# Patient Record
Sex: MALE | Race: WHITE | Employment: OTHER | URBAN - METROPOLITAN AREA
[De-identification: names, ages, dates, MRNs, and addresses within clinical notes are randomized per-mention and may not be internally consistent; named-entity substitution may affect disease eponyms.]

---

## 2017-05-11 ENCOUNTER — TRANSFERRED RECORDS (OUTPATIENT)
Dept: HEALTH INFORMATION MANAGEMENT | Facility: CLINIC | Age: 72
End: 2017-05-11

## 2017-05-24 ENCOUNTER — OFFICE VISIT (OUTPATIENT)
Dept: FAMILY MEDICINE | Facility: CLINIC | Age: 72
End: 2017-05-24
Payer: MEDICARE

## 2017-05-24 VITALS
HEART RATE: 57 BPM | WEIGHT: 251.9 LBS | HEIGHT: 73 IN | BODY MASS INDEX: 33.38 KG/M2 | DIASTOLIC BLOOD PRESSURE: 71 MMHG | SYSTOLIC BLOOD PRESSURE: 141 MMHG | OXYGEN SATURATION: 96 % | TEMPERATURE: 98 F

## 2017-05-24 DIAGNOSIS — I25.10 CORONARY ARTERY DISEASE INVOLVING NATIVE CORONARY ARTERY OF NATIVE HEART WITHOUT ANGINA PECTORIS: ICD-10-CM

## 2017-05-24 DIAGNOSIS — Z89.431 HISTORY OF AMPUTATION OF RIGHT FOOT (H): ICD-10-CM

## 2017-05-24 DIAGNOSIS — I71.20 THORACIC AORTIC ANEURYSM WITHOUT RUPTURE (H): ICD-10-CM

## 2017-05-24 DIAGNOSIS — G62.9 PERIPHERAL POLYNEUROPATHY: ICD-10-CM

## 2017-05-24 DIAGNOSIS — K70.31 ALCOHOLIC CIRRHOSIS OF LIVER WITH ASCITES (H): Primary | ICD-10-CM

## 2017-05-24 DIAGNOSIS — C61 PROSTATE CANCER (H): ICD-10-CM

## 2017-05-24 DIAGNOSIS — L97.511 FOOT ULCER, RIGHT, LIMITED TO BREAKDOWN OF SKIN (H): ICD-10-CM

## 2017-05-24 DIAGNOSIS — I42.9 CARDIOMYOPATHY, UNSPECIFIED (H): ICD-10-CM

## 2017-05-24 DIAGNOSIS — D68.61 ANTIPHOSPHOLIPID ANTIBODY SYNDROME (H): ICD-10-CM

## 2017-05-24 DIAGNOSIS — C83.398 DIFFUSE LARGE B-CELL LYMPHOMA OF EXTRANODAL SITE: ICD-10-CM

## 2017-05-24 DIAGNOSIS — N18.30 CHRONIC KIDNEY DISEASE, STAGE III (MODERATE) (H): ICD-10-CM

## 2017-05-24 DIAGNOSIS — F10.10 ALCOHOL ABUSE: ICD-10-CM

## 2017-05-24 PROCEDURE — 99204 OFFICE O/P NEW MOD 45 MIN: CPT | Performed by: INTERNAL MEDICINE

## 2017-05-24 RX ORDER — WARFARIN SODIUM 5 MG/1
5 TABLET ORAL DAILY
Qty: 40 TABLET | Refills: 0 | Status: CANCELLED | OUTPATIENT
Start: 2017-05-24

## 2017-05-24 NOTE — NURSING NOTE
"Chief Complaint   Patient presents with     Establish Care       Initial /71 (BP Location: Left arm, Patient Position: Chair, Cuff Size: Adult Large)  Pulse 57  Temp 98  F (36.7  C) (Oral)  Ht 6' 1\" (1.854 m)  Wt 251 lb 14.4 oz (114.3 kg)  SpO2 96%  BMI 33.23 kg/m2 Estimated body mass index is 33.23 kg/(m^2) as calculated from the following:    Height as of this encounter: 6' 1\" (1.854 m).    Weight as of this encounter: 251 lb 14.4 oz (114.3 kg).  Medication Reconciliation: complete   Geoff Deluca      "

## 2017-05-24 NOTE — MR AVS SNAPSHOT
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  Call member services at your health plan with any benefit or coverage questions.  Any procedures must be performed at a [facility] facility OR coordinated by your clinic's referral office.    Please bring the following with you to your appointment:    (1) Any X-Rays, CTs or MRIs which have been performed.  Contact the facility where they were done to arrange for  prior to your scheduled appointment.    (2) List of current medications   (3) This referral request   (4) Any documents/labs given to you for this referral            PODIATRY/FOOT & ANKLE SURGERY REFERRAL       Your provider has referred you to: [facility]: [facility] [phone]   [email or web address]    Please be aware that coverage of these services is subject to the terms and limitations of your health insurance  "plan.  Call member services at your health plan with any benefit or coverage questions.      Please bring the following to your appointment:  >>   Any x-rays, CTs or MRIs which have been performed.  Contact the facility where they were done to arrange for  prior to your scheduled appointment.    >>   List of current medications   >>   This referral request   >>   Any documents/labs given to you for this referral                  Follow-up notes from your care team     Return in about 4 weeks (around 6/21/2017) for Routine Visit.      Future tests that were ordered for you today     Open Future Orders        Priority Expected Expires Ordered    US abdominal aorta limited Routine  5/24/2018 5/24/2017            Who to contact     If you have questions or need follow up information about today's clinic visit or your schedule please contact [facility] directly at [phone].  Normal or non-critical lab and imaging results will be communicated to you by MyChart, letter or phone within 4 business days after the clinic has received the results. If you do not hear from us within 7 days, please contact the clinic through [employer] or phone. If you have a critical or abnormal lab result, we will notify you by phone as soon as possible.  Submit refill requests through [employer] or call your pharmacy and they will forward the refill request to us. Please allow 3 business days for your refill to be completed.          Additional Information About Your Visit        [employer] Information     [employer] lets you send messages to your doctor, view your test results, renew your prescriptions, schedule appointments and more. To sign up, go to [email or web address] . Click on \"Log in\" on the left side of the screen, which will take you to the Welcome page. Then click on \"Sign up Now\" on the right side of the page.     You will be asked to enter the access code listed below, as well as some personal information. Please " "follow the directions to create your username and password.     Your access code is: [email or web address]  Expires: 2017  3:36 PM     Your access code will  in 90 days. If you need help or a new code, please call your [facility] clinic or [phone].        Care EveryWhere ID     This is your Care EveryWhere ID. This could be used by other organizations to access your [facility] medical records  [id number]        Your Vitals Were     Pulse Temperature Height Pulse Oximetry BMI (Body Mass Index)       57 98  F (36.7  C) (Oral) 6' 1\" (1.854 m) 96% 33.23 kg/m2        Blood Pressure from Last 3 Encounters:   17 141/71   10/12/16 122/73   14 106/57    Weight from Last 3 Encounters:   17 251 lb 14.4 oz (114.3 kg)   10/12/16 228 lb 5 oz (103.6 kg)   14 212 lb 6.4 oz (96.3 kg)              We Performed the Following     GASTROENTEROLOGY ADULT REF CONSULT ONLY     PODIATRY/FOOT & ANKLE SURGERY REFERRAL          Today's Medication Changes          These changes are accurate as of: 17  4:55 PM.  If you have any questions, ask your nurse or doctor.               These medicines have changed or have updated prescriptions.        Dose/Directions    warfarin 5 MG tablet   Commonly known as:  COUMADIN   This may have changed:  additional instructions   Used for:  Right knee DJD        Dose:  5 mg   Take 1 tablet (5 mg) by mouth daily Take 5 mg daily unless otherwise instructed by orthopedics.   Quantity:  40 tablet   Refills:  0                Primary Care Provider Office Phone # Fax #    Main Hardy [phone]       [facility] [address] [facility] [address]        Thank you!     Thank you for choosing [facility]  for your care. Our goal is always to provide you with excellent care. Hearing back from our patients is one way we can continue to improve our services. Please take a few minutes to complete the written survey that you may receive in the mail " after your visit with us. Thank you!             Your Updated Medication List - Protect others around you: Learn how to safely use, store and throw away your medicines at www.disposemymeds.org.          This list is accurate as of: 5/24/17  4:55 PM.  Always use your most recent med list.                   Brand Name Dispense Instructions for use    ASPIRIN EC PO      Take 81 mg by mouth daily       CARVEDILOL PO      Take 6.25 mg by mouth 2 times daily (with meals)       LASIX PO      Take 20 mg by mouth daily 3 tablets 3 mornings per week, 2 tablets 4 days a week       NEURONTIN PO      Take 600 mg by mouth 3 times daily       warfarin 5 MG tablet    COUMADIN    40 tablet    Take 1 tablet (5 mg) by mouth daily Take 5 mg daily unless otherwise instructed by orthopedics.       ZETIA PO      Take 10 mg by mouth daily

## 2017-05-24 NOTE — PROGRESS NOTES
SUBJECTIVE:                                                    Antonio Ramirez is a 72 year old male who presents to clinic today for the following health issues:      New Patient/Transfer of Care    This is a pleasant 72-year-old retired  with a history of alcoholic cirrhosis with ascites, cardiomyopathy with congestive heart failure, peripheral neuropathy status post foot amputation, prostate cancer, antiphospholipid antibody syndrome with a history of deep vein thrombosis and pulmonary emboli, B-cell lymphoma for which he has not received treatment, monoclonal gammopathy, chronic kidney disease, coronary artery disease, thoracic aortic aneurysm, history of rhabdomyolysis associated with statin therapy, thrombocytopenia, chart history of hemorrhagic pericardial effusion history.  Despite having a history of alcoholic cirrhosis, he has recently started drinking alcohol again. He states that his physicians in [place] advised him that limited consumption of alcohol would be safe for him. He was hospitalized for what sounds like an exacerbation of congestive heart failure in [place] and treated with intravenous diuretics. However, he is unclear on what the plan was for how he was to manage his diuretics following his hospitalization. Recently, he saw a cardiology nurse practitioner who recommended his current dose of furosemide.  Since hospital discharge from [place] he continues to have some shortness of breath with exertion without chest pain. He believes that his abdomen has been becoming more distended recently and has a history of paracenteses in the remote past. He also has noticed an ulceration on the tip of his great toe that has been present for several weeks. The ulceration does not cause pain but he has underlying neuropathy. He does not have a podiatrist. He denies current fevers or chills.  He has swelling in his remaining leg, and has been recommended to use compression hosiery in the past, but  "he declines to do so.  He had also been treated with spironolactone in the past but stopped using it due to breast tenderness side effects. He has his INR checked at the [place] [facility] where he sees Dr. Brown for management of his antiphospholipid antibody syndrome and history of thrombosis.  He is honest with me and tells me that he is transferring his care to this clinic to be closer to his home and because his previous physician in his words \"fired him\".      Problem list and histories reviewed & adjusted, as indicated.  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)     Thoracic aortic aneurysm without rupture (H)     Past Surgical History:   Procedure Laterality Date     AMPUTATION      (L) leg amputation     APPENDECTOMY       ARTHROPLASTY KNEE Right 9/19/2014    Procedure: ARTHROPLASTY KNEE;  Surgeon: Saima Howard MD;  Location:  OR     CARDIAC SURGERY      CABG     CHOLECYSTECTOMY       COLONOSCOPY       CYSTOSCOPY, DILATE URETHRA, COMBINED N/A 10/12/2016    Procedure: COMBINED CYSTOSCOPY, DILATE URETHRA;  Surgeon: Dimitry Bello MD;  Location:  OR     ENDOSCOPIC STRIPPING VEIN(S)       esophagogastroduodenoscopy       EYE SURGERY      cataracts     GENITOURINARY SURGERY      Prostate Seen Implants     HERNIA REPAIR      Umbilical     IR IVC FILTER PLACEMENT       ORTHOPEDIC SURGERY      (R) knee scope     ORTHOPEDIC SURGERY      total hip      ORTHOPEDIC SURGERY      elbow surgery " "      Social History   Substance Use Topics     Smoking status: Never Smoker     Smokeless tobacco: Not on file     Alcohol use Yes      Comment: quit October 2015; now drinking again 2 drinks per day     No family history on file.      Current Outpatient Prescriptions   Medication Sig Dispense Refill     Ezetimibe (ZETIA PO) Take 10 mg by mouth daily       ASPIRIN EC PO Take 81 mg by mouth daily       Furosemide (LASIX PO) Take 20 mg by mouth daily 3 tablets 3 mornings per week, 2 tablets 4 days a week       Gabapentin (NEURONTIN PO) Take 600 mg by mouth 3 times daily       warfarin (COUMADIN) 5 MG tablet Take 1 tablet (5 mg) by mouth daily Take 5 mg daily unless otherwise instructed by orthopedics. (Patient taking differently: Take 5 mg by mouth daily Take 2.5 mg tablets every Wednesday along with 5 mg Coumadin to make total daily dose of 7.5 mg every Wednesday.  5 mg tablet once daily-except Wednesday.) 40 tablet 0     CARVEDILOL PO Take 6.25 mg by mouth 2 times daily (with meals)        Allergies   Allergen Reactions     Ciprofloxacin Itching     Severe itching, like ants were crawling     Hmg-Coa-R Inhibitors      Rhabdomyolysis       Reviewed and updated as needed this visit by clinical staff       Reviewed and updated as needed this visit by Provider         ROS:  Constitutional, HEENT, cardiovascular, pulmonary, gi and gu systems are negative, except as otherwise noted.    OBJECTIVE:                                                    /71 (BP Location: Left arm, Patient Position: Chair, Cuff Size: Adult Large)  Pulse 57  Temp 98  F (36.7  C) (Oral)  Ht 6' 1\" (1.854 m)  Wt 251 lb 14.4 oz (114.3 kg)  SpO2 96%  BMI 33.23 kg/m2  Body mass index is 33.23 kg/(m^2).  Gen.: This is a well-appearing, upbeat, man who does not appear toxic and speaks in full sentences. HEENT: The mucous membranes are moist and the neck is supple without adenopathy. Cardiovascular: The heart has a regular rate and rhythm " without appreciable murmur gallop or rub today. Pulmonary: The lungs are clear to auscultation bilaterally, breathing does not appear to be labored, there is no wheezing, there are no rales or rhonchi. No dullness to percussion. Abdomen: Obese, distended, ventral bulging is noted, there is no obvious shifting dullness or bulging flanks, there is no obvious organomegaly although the exam is difficult. There is no abdominal tenderness noted. Extremities: The right foot is amputated, the left foot demonstrates 2+ edema extending to just below the knee with chronic venous stasis skin changes noted, there is an ulceration at the tip of the right great toe without surrounding skin erythema or areas of fluctuance.  The dorsalis pedis pulse is 2+.    Diagnostic Test Results:  His last Cr was 1.48 in care everywhere     ASSESSMENT/PLAN:                                                              ICD-10-CM    1. Alcoholic cirrhosis of liver with ascites (H) K70.31 US abdominal aorta limited     GASTROENTEROLOGY ADULT REF CONSULT ONLY   2. Cardiomyopathy, unspecified (H) I42.9    3. Peripheral polyneuropathy (H) G62.9    4. Prostate cancer (H) C61    5. Antiphospholipid antibody syndrome (H) D68.61    6. 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 History of amputation of right foot (H) Z89.431    8. Foot ulcer, right, limited to breakdown of skin (H) L97.511 PODIATRY/FOOT & ANKLE SURGERY REFERRAL   9. Thoracic aortic aneurysm without rupture (H) I71.2    10. Coronary artery disease involving native coronary artery of native heart without angina pectoris I25.10    11. Alcohol abuse F10.10    12. Chronic kidney disease, stage III (moderate) N18.3        I spent more than 30 minutes with this patient and his wife in face-to-face contact. He was strictly advised to avoid any use of alcohol going forward. There is no safe amount of alcohol for him given his history of alcoholic cirrhosis with ascites. His  shortness of breath is likely related to his congestive heart failure. He is scheduled to have an echocardiogram to further evaluate on Friday of this week through the [facility]. His congestive heart failure will likely need close management. He would benefit from enrollment in heart failure clinic equivalent to the [facility] clinic at the [facility] heart clinic at [facility]. If there is an equivalent such clinic and his existing cardiology clinic, he should be enrolled and this was discussed with him. He will inquire with his current cardiology clinic. Because of the complex nature of his multiple conditions, he might benefit from seeing specialist that are all within one network for ease of communication between providers and this was discussed with him and his wife. They will consider transitioning his cardiology care to [facility] if that is the case. However, I understand, that they have a relationship with Dr. humphrey at the [facility] which I certainly would not want to disrupt. He should have a hepatologist to and I sent a referral to [facility] for that purpose. For his current abdominal symptoms, I have ordered an ultrasound to see if there is any ascites amenable to paracentesis. If there is such ascites, in the past, he has held his Coumadin for 3 days prior to those procedures. His foot ulcer will need debridement and ongoing monitoring by a podiatrist. He was referred to the podiatrist in our clinic. He will continue follow-up with Dr. Brown at [facility] for management of his Coumadin and his lymphoma, gammopathy and antiphospholipid antibody syndrome. He will need surveillance imaging of his thoracic aneurysm around August 2017. Overall, this is a very complex patient who does not seem to have an adequate understanding of the nature of each of his complex conditions. He would benefit from continuity of care. I  asked him to return in 4 weeks to reevaluate his multiple conditions and to ensure that nothing falls through the cracks as he transfers his care to our clinic.      FUTURE APPOINTMENTS:       - Follow-up visit with podiatry as soon as an appointment is available, with me in 4 weeks, with cardiology pending the results of his echocardiogram on Friday, with [facility] as directed, with [facility] as soon as appointment is available for hepatology consult, schedule abdominal ultrasound as soon as possible    Main Hardy MD  [facility]

## 2017-05-30 ENCOUNTER — RADIANT APPOINTMENT (OUTPATIENT)
Dept: GENERAL RADIOLOGY | Facility: CLINIC | Age: 72
End: 2017-05-30
Attending: PODIATRIST
Payer: MEDICARE

## 2017-05-30 ENCOUNTER — OFFICE VISIT (OUTPATIENT)
Dept: PODIATRY | Facility: CLINIC | Age: 72
End: 2017-05-30
Payer: MEDICARE

## 2017-05-30 VITALS
SYSTOLIC BLOOD PRESSURE: 127 MMHG | BODY MASS INDEX: 32.46 KG/M2 | DIASTOLIC BLOOD PRESSURE: 73 MMHG | WEIGHT: 246 LBS | HEART RATE: 62 BPM

## 2017-05-30 DIAGNOSIS — G62.9 PERIPHERAL POLYNEUROPATHY: ICD-10-CM

## 2017-05-30 DIAGNOSIS — L97.512 ULCER OF TOE, RIGHT, WITH FAT LAYER EXPOSED (H): Primary | ICD-10-CM

## 2017-05-30 PROCEDURE — 11042 DBRDMT SUBQ TIS 1ST 20SQCM/<: CPT | Performed by: PODIATRIST

## 2017-05-30 PROCEDURE — 73660 X-RAY EXAM OF TOE(S): CPT | Mod: RT

## 2017-05-30 PROCEDURE — 99203 OFFICE O/P NEW LOW 30 MIN: CPT | Mod: 25 | Performed by: PODIATRIST

## 2017-05-30 NOTE — MR AVS SNAPSHOT
After Visit Summary   5/30/2017    Antonio Ramirez    MRN: [id number]           Patient Information     Date Of Birth          1945        Visit Information        Provider Department      5/30/2017 1:00 PM Matt Bush, [facility]        Today's Diagnoses     Ulcer of toe, right, with fat layer exposed (H)    -  1    Peripheral polyneuropathy (H)          Care Instructions    Follow up in 1 month.  Dr. Bush's Clinic Locations    Monday Tuesday  [facility]  [address]         [id number]  [place] [address]      [place] [address]  Ph:  [phone]      Ph: [phone]  Fax: [phone]      Fax: [phone]    Wednesday Thursday - Surgery Day  [facility]     Call Rekha @ [phone]  [address] [place] [address]  Ph: [phone]  Fax: [phone]      Wound Care Recommendations:    1)  Keep the wound covered by a bandage when bathing.    2)  Gently clean the wound with soap water, separate from bath/shower water.      3)  Each day, apply a topical antibiotic ointment to the wound (Neosporin, Triple antibiotic, Bacitracin).   Cover with large band-aid or gauze.      4)  Please seek immediate medical attention if any increasing redness, drainage, smell, or pain related to the wound.     5)  Please return to clinic in the period of time requested.            Follow-ups after your visit        Additional Services     ORTHOTICS REFERRAL       Please be aware that coverage of these services is subject to the terms and limitations of your health insurance plan.  Call member services at your health plan with any benefit or coverage questions.      Please bring the following to your appointment:    >>   Any x-rays, CTs or MRIs which have been performed.  Contact the facility where they were done to arrange for  prior to your scheduled appointment.   Any new CT, MRI or other procedures ordered by your specialist must be performed at a [facility] facility or coordinated by your clinic's referral office.    >>   List of current medications   >>   This referral request   >>   Any documents/labs given to you for this referral    ==This Referral PRINTS in the [facility] (ORTHOTICS & PROSTHETICS) Central scheduling office ==     The [facility] Central Scheduling staff will contact patient to arrange appointments. Central Scheduling Phone #:  NELSON Guthrie  [phone]     Orthotics: DH2 shoe, ulcer sub 1st IPJ, please offload                  Follow-up notes from your care team     Return in about 4 weeks (around 6/27/2017).      Your next 10 appointments already scheduled     May 31, 2017 10:30 AM CDT   US ABDOMINAL AORTIC LIMITED with SHUS5   [facility] ([facility])    [address] [address] [phone]           Please bring a list of your medicines (including vitamins, minerals and over-the-counter drugs). Also, tell your doctor about any allergies you may have. Wear comfortable clothes and leave your valuables at home.  Adults: No eating or drinking for 8 hours before the exam. You may take medicine with a small sip of water.  Children: - Children 6+ years: No food or drink for 6 hours before exam. - Children 1-5 years: No food or drink for 4 hours before exam. - Infants, breast-fed: may have breast milk up to 2 hours before exam. - Infants, formula: may have bottle until 4 hours before exam.  Please call the Imaging Department at your exam site with any questions.            Jun 30, 2017  3:00 PM CDT   Office Visit with Main Hardy MD   [facility]    [address] [address] [phone]           Bring a current list of meds and any records pertaining to this visit.  For Physicals, please bring immunization records and  any forms needing to be filled out.  Please arrive 10 minutes early to complete paperwork.              Who to contact     If you have questions or need follow up information about today's clinic visit or your schedule please contact [facility] directly at [phone].  Normal or non-critical lab and imaging results will be communicated to you by MyChart, letter or phone within 4 business days after the clinic has received the results. If you do not hear from us within 7 days, please contact the clinic through [employer] or phone. If you have a critical or abnormal lab result, we will notify you by phone as soon as possible.  Submit refill requests through [employer] or call your pharmacy and they will forward the refill request to us. Please allow 3 business days for your refill to be completed.          Additional Information About Your Visit        MyChart Information     [employer] gives you secure access to your electronic health record. If you see a primary care provider, you can also send messages to your care team and make appointments. If you have questions, please call your primary care clinic.  If you do not have a primary care provider, please call [phone] and they will assist you.        Care EveryWhere ID     This is your Care EveryWhere ID. This could be used by other organizations to access your [facility] medical records  [id number]        Your Vitals Were     Pulse BMI (Body Mass Index)                62 32.46 kg/m2           Blood Pressure from Last 3 Encounters:   05/30/17 127/73   05/24/17 141/71   10/12/16 122/73    Weight from Last 3 Encounters:   05/30/17 246 lb (111.6 kg)   05/24/17 251 lb 14.4 oz (114.3 kg)   10/12/16 228 lb 5 oz (103.6 kg)              We Performed the Following     DEBRIDE SKIN/SUBQ TISSUE     ORTHOTICS REFERRAL     XR Toe Right G/E 2 Views          Today's Medication Changes          These changes are accurate as of: 5/30/17  1:22 PM.  If you have any questions,  ask your nurse or doctor.               These medicines have changed or have updated prescriptions.        Dose/Directions    warfarin 5 MG tablet   Commonly known as:  COUMADIN   This may have changed:  additional instructions   Used for:  Right knee DJD        Dose:  5 mg   Take 1 tablet (5 mg) by mouth daily Take 5 mg daily unless otherwise instructed by orthopedics.   Quantity:  40 tablet   Refills:  0                Primary Care Provider Office Phone # Fax #    Main Hardy [phone]       [facility] [address]  [facility] [address]        Thank you!     Thank you for choosing [facility]  for your care. Our goal is always to provide you with excellent care. Hearing back from our patients is one way we can continue to improve our services. Please take a few minutes to complete the written survey that you may receive in the mail after your visit with us. Thank you!             Your Updated Medication List - Protect others around you: Learn how to safely use, store and throw away your medicines at www.disposemymeds.org.          This list is accurate as of: 5/30/17  1:22 PM.  Always use your most recent med list.                   Brand Name Dispense Instructions for use    ASPIRIN EC PO      Take 81 mg by mouth daily       CARVEDILOL PO      Take 6.25 mg by mouth 2 times daily (with meals)       LASIX PO      Take 20 mg by mouth daily 3 tablets 3 mornings per week, 2 tablets 4 days a week       NEURONTIN PO      Take 600 mg by mouth 3 times daily       warfarin 5 MG tablet    COUMADIN    40 tablet    Take 1 tablet (5 mg) by mouth daily Take 5 mg daily unless otherwise instructed by orthopedics.       ZETIA PO      Take 10 mg by mouth daily

## 2017-05-30 NOTE — PATIENT INSTRUCTIONS
Follow up in 1 month.  Dr. Bush's Clinic Locations    Monday Tuesday  [facility]  [address]         [id number]  [place] [address]      [place] [address]  Ph:  [phone]      Ph: [phone]  Fax: [phone]      Fax: [phone]    Wednesday Thursday - Surgery Day  [facility]     Call Rekha @ [phone]  [address] [place], [place] [address], [place] [address]  Ph: [phone]  Fax: [phone]      Wound Care Recommendations:    1)  Keep the wound covered by a bandage when bathing.    2)  Gently clean the wound with soap water, separate from bath/shower water.      3)  Each day, apply a topical antibiotic ointment to the wound (Neosporin, Triple antibiotic, Bacitracin).   Cover with large band-aid or gauze.      4)  Please seek immediate medical attention if any increasing redness, drainage, smell, or pain related to the wound.     5)  Please return to clinic in the period of time requested.

## 2017-05-30 NOTE — PROGRESS NOTES
"PATIENT HISTORY:  Antonio Ramirez is a 72 year old male who presents to clinic for right 1st toe wound.  Pt is unsure of its duration.  Hx of peripheral neuropathy and L BKA.  Pt is states he doesn't think the toe wound merits a doctor's visit.  He states he is a \"skeptical patient.\"  Denies treatment prior to today.  Denies infection.  No pain reported.  Complex PMH including alcoholic cirrhosis, Ca, cardiomyopathy.  Pt of Dr. Hardy.    Review of Systems:  Patient denies fever, chills, rash, stiffness, limping, weakness, heart burn, blood in stool, chest pain with activity, calf pain when walking, shortness of breath with activity, chronic cough, easy bleeding/bruising, swelling of ankles, excessive thirst, fatigue, depression, anxiety.  Patient admits to wound, numbness.     PAST MEDICAL HISTORY:   Past Medical History:   Diagnosis Date     Alcoholism (H)      Amputated left leg (H)      Anemia      Anticardiolipin antibody syndrome (H)      Antiplatelet or antithrombotic long-term use      Aortic stenosis      Arthritis      Balance problem      Cardiomyopathy (H)      Cardiomyopathy (H)      Coagulation disorder (H)     cardiolipinantibody syndrome     Coronary artery disease      Gastro-oesophageal reflux disease      Heart attack (H)      Heart murmur     Mitral Regurgitation, Moderate     Hiatal hernia      Hypercholesterolemia      Hypertension      Left foot drop      Liver disease     alcoholic liver disease, alcoholic cirrohsosis, portal hypertension     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)      Moderate mitral regurgitation      Monoclonal gammopathy      Neuropathy (H)      Neuropathy (H)      Noninfectious ileitis     diverticulitis of colon     PE (pulmonary embolism) 2006     Phlebitis and thrombophlebitis      Prostate cancer (H)      Renal disease     kidney stones     Syncope      Thoracic aneurysm      Thoracic aortic aneurysm, without rupture (H)      Thrombocytopenia (H)      " Thrombosis of leg      Urethral stricture         PAST SURGICAL HISTORY:   Past Surgical History:   Procedure Laterality Date     AMPUTATION      (L) leg amputation     APPENDECTOMY       ARTHROPLASTY KNEE Right 9/19/2014    Procedure: ARTHROPLASTY KNEE;  Surgeon: Saima Howard MD;  Location:  OR     CARDIAC SURGERY      CABG     CHOLECYSTECTOMY       COLONOSCOPY       CYSTOSCOPY, DILATE URETHRA, COMBINED N/A 10/12/2016    Procedure: COMBINED CYSTOSCOPY, DILATE URETHRA;  Surgeon: Dimitry Bello MD;  Location:  OR     ENDOSCOPIC STRIPPING VEIN(S)       esophagogastroduodenoscopy       EYE SURGERY      cataracts     GENITOURINARY SURGERY      Prostate Seen Implants     HERNIA REPAIR      Umbilical     IR IVC FILTER PLACEMENT       ORTHOPEDIC SURGERY      (R) knee scope     ORTHOPEDIC SURGERY      total hip      ORTHOPEDIC SURGERY      elbow surgery        MEDICATIONS:   Current Outpatient Prescriptions:      Ezetimibe (ZETIA PO), Take 10 mg by mouth daily, Disp: , Rfl:      ASPIRIN EC PO, Take 81 mg by mouth daily, Disp: , Rfl:      Furosemide (LASIX PO), Take 20 mg by mouth daily 3 tablets 3 mornings per week, 2 tablets 4 days a week, Disp: , Rfl:      Gabapentin (NEURONTIN PO), Take 600 mg by mouth 3 times daily, Disp: , Rfl:      warfarin (COUMADIN) 5 MG tablet, Take 1 tablet (5 mg) by mouth daily Take 5 mg daily unless otherwise instructed by orthopedics. (Patient taking differently: Take 5 mg by mouth daily Take 2.5 mg tablets every Wednesday along with 5 mg Coumadin to make total daily dose of 7.5 mg every Wednesday.  5 mg tablet once daily-except Wednesday.), Disp: 40 tablet, Rfl: 0     CARVEDILOL PO, Take 6.25 mg by mouth 2 times daily (with meals) , Disp: , Rfl:      ALLERGIES:    Allergies   Allergen Reactions     Ciprofloxacin Itching     Severe itching, like ants were crawling     Hmg-Coa-R Inhibitors      Rhabdomyolysis        SOCIAL HISTORY:   Social History     Social History     Marital  status:      Spouse name: N/A     Number of children: N/A     Years of education: N/A     Occupational History     Not on file.     Social History Main Topics     Smoking status: Never Smoker     Smokeless tobacco: Not on file     Alcohol use Yes      Comment: quit October 2015; now drinking again 2 drinks per day     Drug use: No     Sexual activity: Not on file     Other Topics Concern     Not on file     Social History Narrative        FAMILY HISTORY: Denies     EXAM:Vitals: /73  Pulse 62  Wt 246 lb (111.6 kg)  BMI 32.46 kg/m2  BMI= Body mass index is 32.46 kg/(m^2).    General appearance: Patient is alert and fully cooperative with history & exam.  No sign of distress is noted during the visit.     Psychiatric: Affect is pleasant & appropriate.  Patient appears motivated to improve health.     Respiratory: Breathing is regular & unlabored while sitting.     HEENT: Hearing is intact to spoken word.  Speech is clear.  No gross evidence of visual impairment that would impact ambulation.     Dermatologic: 2mm x 3mm x 2mm deep ulceration to plantar right 1st IPJ area.  Does not probe to bone. No paronychia or evidence of soft tissue infection is noted.     Vascular: DP & PT pulses are intact & regular on the right.  RLE edema at baseline per pt.  CFT and skin temperature are normal.     Neurologic: Lower extremity sensation is diminished to light touch.     Musculoskeletal: Patient is ambulatory with prosthetic on the left.  No gross ankle deformity noted.  No foot or ankle joint effusion is noted.    XRs of right 1st toe taken for baseline.  No acute findings or erosions.     ASSESSMENT: Right 1st toe ulceration, peripheral neuropathy, hx of L BKA     PLAN:  Reviewed patient's chart in epic.  Discussed condition and treatment options including pros and cons.    Treatment alternatives for foot ulceration were discussed.  Local wound care options were explained.  Healing will be a significant challenge  based on the weight bearing / pressure location of the ulcer.  I explained that the ulcer is likely to become complicated at some point.  Risk of deep infection will become greater if the wound becomes larger or deeper.    Potential for infection was described including the soft tissues, bone or joints.  Surgery would likely involve hospitalization and partial limb amputation.     With consent I performed excisional debridement of the right 1st toe wound up to and including sub q tissue with a sterile tissue nipper.  Sterile dressing applied.  Mild bleeding noted, controlled.      Wound Care Recommendations:    1)  Keep the wound covered by a bandage when bathing.    2)  Gently clean the wound with soap water, separate from bath/shower water.      3)  Each day, apply a topical antibiotic ointment to the wound (Neosporin, Triple antibiotic, Bacitracin).   Cover with large band-aid or gauze.      4)  Please seek immediate medical attention if any increasing redness, drainage, smell, or pain related to the wound.     5)  Please return to clinic in the period of time requested.    F/u 4 wks.  DH2 shoe ordered to offload.           Matt Bush, [facility], FACFAS

## 2017-05-30 NOTE — NURSING NOTE
"Chief Complaint   Patient presents with     Consult       Initial /73  Pulse 62  Wt 246 lb (111.6 kg)  BMI 32.46 kg/m2 Estimated body mass index is 32.46 kg/(m^2) as calculated from the following:    Height as of 5/24/17: 6' 1\" (1.854 m).    Weight as of this encounter: 246 lb (111.6 kg).  Medication Reconciliation: complete    "

## 2017-05-30 NOTE — LETTER
"  5/30/2017       RE: Antonio Ramirez  [address]   [facility] [address]           Dear Colleague,    Thank you for referring your patient, Antonio Ramirez, to the [facility]. Please see a copy of my visit note below.    Weight management plan: Patient was referred to their PCP to discuss a diet and exercise plan.      PATIENT HISTORY:  Antonio Ramirez is a 72 year old male who presents to clinic for right 1st toe wound.  Pt is unsure of its duration.  Hx of peripheral neuropathy and L BKA.  Pt is states he doesn't think the toe wound merits a doctor's visit.  He states he is a \"skeptical patient.\"  Denies treatment prior to today.  Denies infection.  No pain reported.  Complex PMH including alcoholic cirrhosis, Ca, cardiomyopathy.  Pt of Dr. Hardy.    Review of Systems:  Patient denies fever, chills, rash, stiffness, limping, weakness, heart burn, blood in stool, chest pain with activity, calf pain when walking, shortness of breath with activity, chronic cough, easy bleeding/bruising, swelling of ankles, excessive thirst, fatigue, depression, anxiety.  Patient admits to wound, numbness.     PAST MEDICAL HISTORY:   Past Medical History:   Diagnosis Date     Alcoholism (H)      Amputated left leg (H)      Anemia      Anticardiolipin antibody syndrome (H)      Antiplatelet or antithrombotic long-term use      Aortic stenosis      Arthritis      Balance problem      Cardiomyopathy (H)      Cardiomyopathy (H)      Coagulation disorder (H)     cardiolipinantibody syndrome     Coronary artery disease      Gastro-oesophageal reflux disease      Heart attack (H)      Heart murmur     Mitral Regurgitation, Moderate     Hiatal hernia      Hypercholesterolemia      Hypertension      Left foot drop      Liver disease     alcoholic liver disease, alcoholic cirrohsosis, portal hypertension     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)      Moderate mitral regurgitation      Monoclonal gammopathy      " Neuropathy (H)      Neuropathy (H)      Noninfectious ileitis     diverticulitis of colon     PE (pulmonary embolism) 2006     Phlebitis and thrombophlebitis      Prostate cancer (H)      Renal disease     kidney stones     Syncope      Thoracic aneurysm      Thoracic aortic aneurysm, without rupture (H)      Thrombocytopenia (H)      Thrombosis of leg      Urethral stricture         PAST SURGICAL HISTORY:   Past Surgical History:   Procedure Laterality Date     AMPUTATION      (L) leg amputation     APPENDECTOMY       ARTHROPLASTY KNEE Right 9/19/2014    Procedure: ARTHROPLASTY KNEE;  Surgeon: Saima Howard MD;  Location:  OR     CARDIAC SURGERY      CABG     CHOLECYSTECTOMY       COLONOSCOPY       CYSTOSCOPY, DILATE URETHRA, COMBINED N/A 10/12/2016    Procedure: COMBINED CYSTOSCOPY, DILATE URETHRA;  Surgeon: Dimitry Bello MD;  Location:  OR     ENDOSCOPIC STRIPPING VEIN(S)       esophagogastroduodenoscopy       EYE SURGERY      cataracts     GENITOURINARY SURGERY      Prostate Seen Implants     HERNIA REPAIR      Umbilical     IR IVC FILTER PLACEMENT       ORTHOPEDIC SURGERY      (R) knee scope     ORTHOPEDIC SURGERY      total hip      ORTHOPEDIC SURGERY      elbow surgery        MEDICATIONS:   Current Outpatient Prescriptions:      Ezetimibe (ZETIA PO), Take 10 mg by mouth daily, Disp: , Rfl:      ASPIRIN EC PO, Take 81 mg by mouth daily, Disp: , Rfl:      Furosemide (LASIX PO), Take 20 mg by mouth daily 3 tablets 3 mornings per week, 2 tablets 4 days a week, Disp: , Rfl:      Gabapentin (NEURONTIN PO), Take 600 mg by mouth 3 times daily, Disp: , Rfl:      warfarin (COUMADIN) 5 MG tablet, Take 1 tablet (5 mg) by mouth daily Take 5 mg daily unless otherwise instructed by orthopedics. (Patient taking differently: Take 5 mg by mouth daily Take 2.5 mg tablets every Wednesday along with 5 mg Coumadin to make total daily dose of 7.5 mg every Wednesday.  5 mg tablet once daily-except Wednesday.), Disp: 40  tablet, Rfl: 0     CARVEDILOL PO, Take 6.25 mg by mouth 2 times daily (with meals) , Disp: , Rfl:      ALLERGIES:    Allergies   Allergen Reactions     Ciprofloxacin Itching     Severe itching, like ants were crawling     Hmg-Coa-R Inhibitors      Rhabdomyolysis        SOCIAL HISTORY:   Social History     Social History     Marital status:      Spouse name: N/A     Number of children: N/A     Years of education: N/A     Occupational History     Not on file.     Social History Main Topics     Smoking status: Never Smoker     Smokeless tobacco: Not on file     Alcohol use Yes      Comment: quit October 2015; now drinking again 2 drinks per day     Drug use: No     Sexual activity: Not on file     Other Topics Concern     Not on file     Social History Narrative        FAMILY HISTORY: Denies     EXAM:Vitals: /73  Pulse 62  Wt 246 lb (111.6 kg)  BMI 32.46 kg/m2  BMI= Body mass index is 32.46 kg/(m^2).    General appearance: Patient is alert and fully cooperative with history & exam.  No sign of distress is noted during the visit.     Psychiatric: Affect is pleasant & appropriate.  Patient appears motivated to improve health.     Respiratory: Breathing is regular & unlabored while sitting.     HEENT: Hearing is intact to spoken word.  Speech is clear.  No gross evidence of visual impairment that would impact ambulation.     Dermatologic: 2mm x 3mm x 2mm deep ulceration to plantar right 1st IPJ area.  Does not probe to bone. No paronychia or evidence of soft tissue infection is noted.     Vascular: DP & PT pulses are intact & regular on the right.  RLE edema at baseline per pt.  CFT and skin temperature are normal.     Neurologic: Lower extremity sensation is diminished to light touch.     Musculoskeletal: Patient is ambulatory with prosthetic on the left.  No gross ankle deformity noted.  No foot or ankle joint effusion is noted.    XRs of right 1st toe taken for baseline.  No acute findings or  erosions.     ASSESSMENT: Right 1st toe ulceration, peripheral neuropathy, hx of L BKA     PLAN:  Reviewed patient's chart in epic.  Discussed condition and treatment options including pros and cons.    Treatment alternatives for foot ulceration were discussed.  Local wound care options were explained.  Healing will be a significant challenge based on the weight bearing / pressure location of the ulcer.  I explained that the ulcer is likely to become complicated at some point.  Risk of deep infection will become greater if the wound becomes larger or deeper.    Potential for infection was described including the soft tissues, bone or joints.  Surgery would likely involve hospitalization and partial limb amputation.     With consent I performed excisional debridement of the right 1st toe wound up to and including sub q tissue with a sterile tissue nipper.  Sterile dressing applied.  Mild bleeding noted, controlled.      Wound Care Recommendations:    1)  Keep the wound covered by a bandage when bathing.    2)  Gently clean the wound with soap water, separate from bath/shower water.      3)  Each day, apply a topical antibiotic ointment to the wound (Neosporin, Triple antibiotic, Bacitracin).   Cover with large band-aid or gauze.      4)  Please seek immediate medical attention if any increasing redness, drainage, smell, or pain related to the wound.     5)  Please return to clinic in the period of time requested.    F/u 4 wks.  DH2 shoe ordered to offload.           Matt Bush DPM, FACFAS      Again, thank you for allowing me to participate in the care of your patient.        Sincerely,              Matt Bush DPM

## 2017-05-31 ENCOUNTER — HOSPITAL ENCOUNTER (OUTPATIENT)
Dept: ULTRASOUND IMAGING | Facility: CLINIC | Age: 72
Discharge: HOME | End: 2017-05-31
Attending: INTERNAL MEDICINE | Admitting: INTERNAL MEDICINE
Payer: MEDICARE

## 2017-05-31 DIAGNOSIS — K70.31 ALCOHOLIC CIRRHOSIS OF LIVER WITH ASCITES (H): ICD-10-CM

## 2017-05-31 PROCEDURE — 76705 ECHO EXAM OF ABDOMEN: CPT

## 2017-06-01 NOTE — PROGRESS NOTES
Discussed results with patient by phone.      Proceed with ultrasound guided paracentesis    In the past he has held his coumadin for 3 doses prior to these procedures    Discussed with Dr. Mcintyre from hematology, who thinks that holding 3 doses is OK without bridge, but if coumadin needs to be completely reversed then, bridging lovenox would be needed.  If radiology no comfortable with proceeding with procedure with above plan, then a lovenox bridge will be needed    Radiology order for procedure placed    Patient tells me that he has an appointment with hepatologist and follow up arranged with cardiology clinic at [facility] [place]     Podiatry note reviewed

## 2017-06-05 ENCOUNTER — TRANSFERRED RECORDS (OUTPATIENT)
Dept: HEALTH INFORMATION MANAGEMENT | Facility: CLINIC | Age: 72
End: 2017-06-05

## 2017-06-07 ENCOUNTER — HOSPITAL ENCOUNTER (OUTPATIENT)
Facility: CLINIC | Age: 72
Discharge: HOME | End: 2017-06-07
Attending: INTERNAL MEDICINE | Admitting: INTERNAL MEDICINE
Payer: MEDICARE

## 2017-06-07 ENCOUNTER — HOSPITAL ENCOUNTER (OUTPATIENT)
Dept: ULTRASOUND IMAGING | Facility: CLINIC | Age: 72
End: 2017-06-07
Attending: INTERNAL MEDICINE | Admitting: INTERNAL MEDICINE
Payer: MEDICARE

## 2017-06-07 VITALS — DIASTOLIC BLOOD PRESSURE: 89 MMHG | OXYGEN SATURATION: 96 % | SYSTOLIC BLOOD PRESSURE: 142 MMHG

## 2017-06-07 VITALS
RESPIRATION RATE: 16 BRPM | HEART RATE: 56 BPM | OXYGEN SATURATION: 96 % | DIASTOLIC BLOOD PRESSURE: 66 MMHG | SYSTOLIC BLOOD PRESSURE: 127 MMHG | TEMPERATURE: 98 F

## 2017-06-07 DIAGNOSIS — K70.31 ALCOHOLIC CIRRHOSIS OF LIVER WITH ASCITES (H): ICD-10-CM

## 2017-06-07 LAB
INR PPP: 2.68 (ref 0.86–1.14)
PLATELET # BLD AUTO: 131 10E9/L (ref 150–450)

## 2017-06-07 PROCEDURE — 85610 PROTHROMBIN TIME: CPT | Performed by: INTERNAL MEDICINE

## 2017-06-07 PROCEDURE — 40000863 ZZH STATISTIC RADIOLOGY XRAY, US, CT, MAR, NM

## 2017-06-07 PROCEDURE — 36415 COLL VENOUS BLD VENIPUNCTURE: CPT | Performed by: INTERNAL MEDICINE

## 2017-06-07 PROCEDURE — 27210190 US PARACENTESIS

## 2017-06-07 PROCEDURE — 85049 AUTOMATED PLATELET COUNT: CPT | Performed by: INTERNAL MEDICINE

## 2017-06-07 RX ORDER — LIDOCAINE HYDROCHLORIDE 10 MG/ML
10 INJECTION, SOLUTION EPIDURAL; INFILTRATION; INTRACAUDAL; PERINEURAL ONCE
Status: COMPLETED | OUTPATIENT
Start: 2017-06-07 | End: 2017-06-07

## 2017-06-07 RX ADMIN — LIDOCAINE HYDROCHLORIDE 100 MG: 10 INJECTION, SOLUTION EPIDURAL; INFILTRATION; INTRACAUDAL; PERINEURAL at 10:55

## 2017-06-07 NOTE — IP AVS SNAPSHOT
[place] [address]    [place] [address]    Phone:  [phone]                                       After Visit Summary   6/7/2017    Antonio Ramirez    MRN: [id number]           After Visit Summary Signature Page     I have received my discharge instructions, and my questions have been answered. I have discussed any challenges I see with this plan with the nurse or doctor.    ..........................................................................................................................................  Patient/Patient Representative Signature      ..........................................................................................................................................  Patient Representative Print Name and Relationship to Patient    ..................................................               ................................................  Date                                            Time    ..........................................................................................................................................  Reviewed by Signature/Title    ...................................................              ..............................................  Date                                                            Time

## 2017-06-07 NOTE — PROGRESS NOTES
Patient tolerated paracentesis well.  1700cc aspirated.  Report to Anuja NAVA RN.    1122  Patient states understanding of discharge instructions.  Taking po clear liquids.  Ambulated without difficulty.    1123  Discharged, ambulatory.

## 2017-06-07 NOTE — IP AVS SNAPSHOT
MRN:[id number]                      After Visit Summary   6/7/2017    Antonio Ramirez    MRN: [id number]           Visit Information        Department      6/7/2017  9:18 AM [facility]          Review of your medicines      UNREVIEWED medicines. Ask your doctor about these medicines        Dose / Directions    ASPIRIN EC PO        Dose:  81 mg   Take 81 mg by mouth daily   Refills:  0       BUMEX PO        Dose:  1 mg   Take 1 mg by mouth 2 times daily   Refills:  0       CARVEDILOL PO        Dose:  3.125 mg   Take 3.125 mg by mouth 2 times daily (with meals) Per pt dose was reduced from 6.25 a few months ago   Refills:  0       NEURONTIN PO        Dose:  600 mg   Take 600 mg by mouth 3 times daily Takes 600 mg in AM, 600 mg around midday and 1200 mg at HS.   Refills:  0       potassium chloride francis er   Commonly known as:  K-DUR        Dose:  10 mEq   Take 10 mEq by mouth daily   Refills:  0       warfarin 5 MG tablet   Commonly known as:  COUMADIN   Used for:  Right knee DJD        Dose:  5 mg   Take 1 tablet (5 mg) by mouth daily Take 5 mg daily unless otherwise instructed by orthopedics.   Quantity:  40 tablet   Refills:  0       ZETIA PO        Dose:  10 mg   Take 10 mg by mouth daily   Refills:  0                Protect others around you: Learn how to safely use, store and throw away your medicines at www.disposemymeds.org.         Follow-ups after your visit        Your next 10 appointments already scheduled     Jerrod 15, 2017 11:00 AM CDT   LAB with [facility]   [facility]    [address] [facility] [address] [phone]           Patient must bring picture ID.  Patient should be prepared to give a urine specimen  Please do not eat 10-12 hours before your appointment if you are coming in fasting for labs on lipids, cholesterol, or glucose (sugar).  Pregnant women should follow their Care Team instructions. Water with  medications is okay. Do not drink coffee or other fluids.   If you have concerns about taking  your medications, please ask at office or if scheduling via [employer], send a message by clicking on Secure Messaging, Message Your Care Team.            Jun 27, 2017 10:00 AM CDT   [facility]    [address] [facility] [address] [facility] [address]   [phone]            Jun 30, 2017  3:00 PM CDT   Office Visit with Main Hardy MD   [facility]    [address] [facility] [address]   [phone]           Bring a current list of meds and any records pertaining to this visit.  For Physicals, please bring immunization records and any forms needing to be filled out.  Please arrive 10 minutes early to complete paperwork.               Care Instructions        Further instructions from your care team       Paracentesis Discharge Instructions     After you go home:      You may resume your normal diet.    Care of Puncture Site:      For the first 48 hrs, check your puncture site every couple hours while you are awake     If there is a bandaid - you may remove it tomorrow morning    You may shower tomorrow    No tub baths, whirlpools or swimming until your puncture site has fully healed    Fluid may leak from the site. Change the bandaid as needed - keep the site dry    If the fluid leaks for more than 48 hours, call your ordering provider     Activity:      You may go back to normal activity in 24 hours     Wait 48 hours before lifting, straining, exercise or other strenuous activity    Medicines:      You may resume all your medications    For minor pain, you may take Acetaminophen (Tylenol) or Ibuprofen (Advil)            Call the provider who ordered this procedure if:      The site is red, swollen, hot or tender    Blood or fluid is draining from the site    Chills or a fever greater than 101 F (38  C)    Pain that is getting worse    Leaking from the site that does not stop    Any questions or concerns      If you have questions call:        [facility] Radiology Dept @ [phone]      The provider who performed your procedure was __Dr. Canales_______________.      Additional Information About Your Visit        MyChart Information     [employer] gives you secure access to your electronic health record. If you see a primary care provider, you can also send messages to your care team and make appointments. If you have questions, please call your primary care clinic.  If you do not have a primary care provider, please call [phone] and they will assist you.        Care EveryWhere ID     This is your Care EveryWhere ID. This could be used by other organizations to access your [facility] medical records  [id number]        Your Vitals Were     Blood Pressure Pulse Temperature Respirations Pulse Oximetry       136/72 (BP Location: Right arm) 56 98  F (36.7  C) (Oral) 18 96%        Primary Care Provider Office Phone # Fax #    Main Hardy [phone]      Thank you!     Thank you for choosing [facility] for your care. Our goal is always to provide you with excellent care. Hearing back from our patients is one way we can continue to improve our services. Please take a few minutes to complete the written survey that you may receive in the mail after you visit with us. Thank you!             Medication List: This is a list of all your medications and when to take them. Check marks below indicate your daily home schedule. Keep this list as a reference.      Medications           Morning Afternoon Evening Bedtime As Needed    ASPIRIN EC PO   Take 81 mg by mouth daily                                BUMEX PO   Take 1 mg by mouth 2 times daily                                CARVEDILOL PO   Take 3.125 mg by mouth 2 times daily (with meals) Per pt dose was reduced from 6.25 a few months ago                                 NEURONTIN PO   Take 600 mg by mouth 3 times daily Takes 600 mg in AM, 600 mg around midday and 1200 mg at HS.                                potassium chloride francis er   Commonly known as:  K-DUR   Take 10 mEq by mouth daily                                warfarin 5 MG tablet   Commonly known as:  COUMADIN   Take 1 tablet (5 mg) by mouth daily Take 5 mg daily unless otherwise instructed by orthopedics.                                ZETIA PO   Take 10 mg by mouth daily

## 2017-06-07 NOTE — DISCHARGE INSTRUCTIONS

## 2017-06-07 NOTE — PROGRESS NOTES
RADIOLOGY PROCEDURE NOTE  Patient name: Antonio Ramirez  MRN: [id number]  : 1945    Pre-procedure diagnosis: Ascites  Post-procedure diagnosis: Same    Procedure Date/Time: 2017  11:06 AM  Procedure: Paracentesis  Estimated blood loss: None  Specimen(s) collected with description: ascitic fluid  The patient tolerated the procedure well with no immediate complications.  Significant findings:none    See imaging dictation for procedural details.    Provider name: Edgardo White  Assistant(s):None

## 2017-06-13 ENCOUNTER — DOCUMENTATION ONLY (OUTPATIENT)
Dept: LAB | Facility: CLINIC | Age: 72
End: 2017-06-13

## 2017-06-13 NOTE — PROGRESS NOTES
Patient is scheduled for lab only on 6/15/17 for a lab recheck. Please review and place future orders to be completed at that time.    Thank you.

## 2017-06-13 NOTE — TELEPHONE ENCOUNTER
Not sure what the patient is referring to     Did one of his specialty physicians order labs?    Check with patient    I do want to see him for a follow up office visit appointment later this month

## 2017-06-13 NOTE — PROGRESS NOTES
Please contact the patient to schedule follow up for Antony.       Main Hardy MD   Internal Medicine    LAB REQUEST   Reason for visit           Telephone Encounter   Main Hardy MD (Physician)     Internal Medicine      Not sure what the patient is referring to      Did one of his specialty physicians order labs?     Check with patient     I do want to see him for a follow up office visit appointment later this month

## 2017-06-27 ENCOUNTER — OFFICE VISIT (OUTPATIENT)
Dept: PODIATRY | Facility: CLINIC | Age: 72
End: 2017-06-27
Payer: MEDICARE

## 2017-06-27 VITALS
WEIGHT: 246 LBS | HEIGHT: 73 IN | SYSTOLIC BLOOD PRESSURE: 127 MMHG | DIASTOLIC BLOOD PRESSURE: 74 MMHG | HEART RATE: 61 BPM | TEMPERATURE: 98.7 F | BODY MASS INDEX: 32.6 KG/M2

## 2017-06-27 DIAGNOSIS — L97.511 ULCER OF TOE, RIGHT, LIMITED TO BREAKDOWN OF SKIN (H): Primary | ICD-10-CM

## 2017-06-27 PROCEDURE — 99213 OFFICE O/P EST LOW 20 MIN: CPT | Performed by: PODIATRIST

## 2017-06-27 NOTE — LETTER
"      6/27/2017         RE: Antonio Ramirez  [address]   [facility] [address]           Dear Colleague,    Thank you for referring your patient, Antonio Ramirez, to the [facility]. Please see a copy of my visit note below.    Weight management plan: Patient was referred to their PCP to discuss a diet and exercise plan.     SHAQ Guerrero MA June 27, 2017 9:51 AM      PATIENT HISTORY:  Antonio Ramirez is a 72 year old male who presents to clinic for recheck of a right 1st toe ulcer.  Pt did not get his DH2 shoe.  He is not dressing the toe.  Pt remains quite skeptical about treatment for this issue.  No pain.  Denies drainage.  Nonsmoker.  Nondiabetic.  Complex PMH including alcoholic cirrhosis, Ca, cardiomyopathy.     EXAM:Vitals: /74 (BP Location: Left arm, Patient Position: Chair, Cuff Size: Adult Large)  Pulse 61  Temp 98.7  F (37.1  C) (Tympanic)  Ht 6' 1\" (1.854 m)  Wt 246 lb (111.6 kg)  BMI 32.46 kg/m2  BMI= Body mass index is 32.46 kg/(m^2).    General appearance: Patient is alert and fully cooperative with history & exam.  No sign of distress is noted during the visit.     Dermatologic: Small eschar/callus to plantar right 1st IPJ area.  No paronychia or evidence of soft tissue infection is noted.      Vascular: DP & PT pulses are intact & regular on the right.  CFT and skin temperature are normal.      Neurologic: Lower extremity sensation is diminished to light touch.      Musculoskeletal: Patient is ambulatory with prosthetic on the left.  No gross ankle deformity noted.  No foot or ankle joint effusion is noted.      ASSESSMENT: Right 1st toe ulceration nearly healed, peripheral neuropathy, hx of L BKA      PLAN:  Reviewed patient's chart in epic.  Discussed condition and treatment options including pros and cons.     Pt declined debridement today due bleeding after last visit.  Area very nearly healed, however.    Discussed risk of amputation with ulceration.    Bacitracin and " band aid applied.     Wound Care Recommendations given:     1)  Keep the wound covered by a bandage when bathing.     2)  Gently clean the wound with soap water, separate from bath/shower water.       3)  Each day, apply a topical antibiotic ointment to the wound (Neosporin, Triple antibiotic, Bacitracin).   Cover with large band-aid or gauze.       4)  Please seek immediate medical attention if any increasing redness, drainage, smell, or pain related to the wound.      5)  Please return to clinic in the period of time requested.     Orthotics phone number given for 2 shoe.  Offload toe.    Check feet daily.    F/u 1 month if not healed.        Matt Bush DPM, FACFAS          Again, thank you for allowing me to participate in the care of your patient.        Sincerely,        Matt Bush DPM

## 2017-06-27 NOTE — PATIENT INSTRUCTIONS
Wound Care Recommendations:    1)  Keep the wound covered by a bandage when bathing.    2)  Gently clean the wound with soap water, separate from bath/shower water.      3)  Each day, apply a topical antibiotic ointment to the wound (Neosporin, Triple antibiotic, Bacitracin).   Cover with large band-aid or gauze.      4)  Please seek immediate medical attention if any increasing redness, drainage, smell, or pain related to the wound.     5)  Please return to clinic in the period of time requested.        [phone]

## 2017-06-27 NOTE — NURSING NOTE
"Chief Complaint   Patient presents with     Ulcer     Follow up R 1st toe ulcer       Initial /74 (BP Location: Left arm, Patient Position: Chair, Cuff Size: Adult Large)  Pulse 61  Temp 98.7  F (37.1  C) (Tympanic)  Ht 6' 1\" (1.854 m)  Wt 246 lb (111.6 kg)  BMI 32.46 kg/m2 Estimated body mass index is 32.46 kg/(m^2) as calculated from the following:    Height as of this encounter: 6' 1\" (1.854 m).    Weight as of this encounter: 246 lb (111.6 kg).  Medication Reconciliation: shadia Guerrero MA June 27, 2017 9:51 AM  "

## 2017-06-27 NOTE — PROGRESS NOTES
Weight management plan: Patient was referred to their PCP to discuss a diet and exercise plan.     SHAQ Guerrero MA June 27, 2017 9:51 AM

## 2017-06-27 NOTE — PROGRESS NOTES
"PATIENT HISTORY:  Antonio Ramirez is a 72 year old male who presents to clinic for recheck of a right 1st toe ulcer.  Pt did not get his DH2 shoe.  He is not dressing the toe.  Pt remains quite skeptical about treatment for this issue.  No pain.  Denies drainage.  Nonsmoker.  Nondiabetic.  Complex PMH including alcoholic cirrhosis, Ca, cardiomyopathy.     EXAM:Vitals: /74 (BP Location: Left arm, Patient Position: Chair, Cuff Size: Adult Large)  Pulse 61  Temp 98.7  F (37.1  C) (Tympanic)  Ht 6' 1\" (1.854 m)  Wt 246 lb (111.6 kg)  BMI 32.46 kg/m2  BMI= Body mass index is 32.46 kg/(m^2).    General appearance: Patient is alert and fully cooperative with history & exam.  No sign of distress is noted during the visit.     Dermatologic: Small eschar/callus to plantar right 1st IPJ area.  No paronychia or evidence of soft tissue infection is noted.      Vascular: DP & PT pulses are intact & regular on the right.  CFT and skin temperature are normal.      Neurologic: Lower extremity sensation is diminished to light touch.      Musculoskeletal: Patient is ambulatory with prosthetic on the left.  No gross ankle deformity noted.  No foot or ankle joint effusion is noted.      ASSESSMENT: Right 1st toe ulceration nearly healed, peripheral neuropathy, hx of L BKA      PLAN:  Reviewed patient's chart in epic.  Discussed condition and treatment options including pros and cons.     Pt declined debridement today due bleeding after last visit.  Area very nearly healed, however.    Discussed risk of amputation with ulceration.    Bacitracin and band aid applied.     Wound Care Recommendations given:     1)  Keep the wound covered by a bandage when bathing.     2)  Gently clean the wound with soap water, separate from bath/shower water.       3)  Each day, apply a topical antibiotic ointment to the wound (Neosporin, Triple antibiotic, Bacitracin).   Cover with large band-aid or gauze.       4)  Please seek immediate medical " attention if any increasing redness, drainage, smell, or pain related to the wound.      5)  Please return to clinic in the period of time requested.     Orthotics phone number given for DH2 shoe.  Offload toe.    Check feet daily.    F/u 1 month if not healed.        Matt Bush DPM, FACFAS

## 2017-06-30 ENCOUNTER — OFFICE VISIT (OUTPATIENT)
Dept: FAMILY MEDICINE | Facility: CLINIC | Age: 72
End: 2017-06-30
Payer: MEDICARE

## 2017-06-30 VITALS
WEIGHT: 254 LBS | SYSTOLIC BLOOD PRESSURE: 110 MMHG | BODY MASS INDEX: 33.66 KG/M2 | HEART RATE: 59 BPM | OXYGEN SATURATION: 94 % | HEIGHT: 73 IN | DIASTOLIC BLOOD PRESSURE: 59 MMHG | TEMPERATURE: 97.3 F

## 2017-06-30 DIAGNOSIS — D68.61 ANTIPHOSPHOLIPID ANTIBODY SYNDROME (H): ICD-10-CM

## 2017-06-30 DIAGNOSIS — I50.9 CHRONIC CONGESTIVE HEART FAILURE, UNSPECIFIED CONGESTIVE HEART FAILURE TYPE: ICD-10-CM

## 2017-06-30 DIAGNOSIS — Z12.11 SCREEN FOR COLON CANCER: ICD-10-CM

## 2017-06-30 DIAGNOSIS — F10.10 ALCOHOL ABUSE: Primary | ICD-10-CM

## 2017-06-30 DIAGNOSIS — L97.519 FOOT ULCER, RIGHT, WITH UNSPECIFIED SEVERITY (H): ICD-10-CM

## 2017-06-30 DIAGNOSIS — K70.31 ALCOHOLIC CIRRHOSIS OF LIVER WITH ASCITES (H): ICD-10-CM

## 2017-06-30 PROCEDURE — 99213 OFFICE O/P EST LOW 20 MIN: CPT | Performed by: INTERNAL MEDICINE

## 2017-06-30 RX ORDER — TORSEMIDE 20 MG/1
20 TABLET ORAL 2 TIMES DAILY
Status: ON HOLD | COMMUNITY
Start: 2017-12-18 | End: 2018-01-29

## 2017-06-30 NOTE — NURSING NOTE
"Chief Complaint   Patient presents with     Follow Up For     Alcoholic cirrhosis of liver (H), CKD Stage III, CAD       Initial /59 (BP Location: Left arm, Cuff Size: Adult Large)  Pulse 59  Temp 97.3  F (36.3  C) (Tympanic)  Ht 6' 1\" (1.854 m)  Wt 254 lb (115.2 kg)  SpO2 94%  BMI 33.51 kg/m2 Estimated body mass index is 33.51 kg/(m^2) as calculated from the following:    Height as of this encounter: 6' 1\" (1.854 m).    Weight as of this encounter: 254 lb (115.2 kg).  Medication Reconciliation: complete   Nicole Ramos MA      "

## 2017-06-30 NOTE — MR AVS SNAPSHOT
After Visit Summary   6/30/2017    Antonio Ramirez    MRN: [id number]           Patient Information     Date Of Birth          1945        Visit Information        Provider Department      6/30/2017 3:00 PM Main Hardy MD [facility]        Today's Diagnoses     Alcohol abuse    -  1    Alcoholic cirrhosis of liver with ascites (H)        Chronic congestive heart failure, unspecified congestive heart failure type (H)        Antiphospholipid antibody syndrome (H)        Foot ulcer, right, with unspecified severity (H)        Screen for colon cancer           Follow-ups after your visit        Who to contact     If you have questions or need follow up information about today's clinic visit or your schedule please contact [facility] directly at [phone].  Normal or non-critical lab and imaging results will be communicated to you by [employer], letter or phone within 4 business days after the clinic has received the results. If you do not hear from us within 7 days, please contact the clinic through [employer] or phone. If you have a critical or abnormal lab result, we will notify you by phone as soon as possible.  Submit refill requests through [employer] or call your pharmacy and they will forward the refill request to us. Please allow 3 business days for your refill to be completed.          Additional Information About Your Visit        MyChart Information     [employer] gives you secure access to your electronic health record. If you see a primary care provider, you can also send messages to your care team and make appointments. If you have questions, please call your primary care clinic.  If you do not have a primary care provider, please call [phone] and they will assist you.        Care EveryWhere ID     This is your Care EveryWhere ID. This could be used by other organizations to access your [facility] medical records  [id number]        Your Vitals Were     Pulse  "Temperature Height Pulse Oximetry BMI (Body Mass Index)       59 97.3  F (36.3  C) (Tympanic) 6' 1\" (1.854 m) 94% 33.51 kg/m2        Blood Pressure from Last 3 Encounters:   06/30/17 110/59   06/27/17 127/74   06/07/17 127/66    Weight from Last 3 Encounters:   06/30/17 254 lb (115.2 kg)   06/27/17 246 lb (111.6 kg)   05/30/17 246 lb (111.6 kg)              Today, you had the following     No orders found for display         Today's Medication Changes          These changes are accurate as of: 6/30/17  3:52 PM.  If you have any questions, ask your nurse or doctor.               These medicines have changed or have updated prescriptions.        Dose/Directions    warfarin 5 MG tablet   Commonly known as:  COUMADIN   This may have changed:  additional instructions   Used for:  Right knee DJD        Dose:  5 mg   Take 1 tablet (5 mg) by mouth daily Take 5 mg daily unless otherwise instructed by orthopedics.   Quantity:  40 tablet   Refills:  0         Stop taking these medicines if you haven't already. Please contact your care team if you have questions.     BUMEX PO   Stopped by:  Main Hardy MD                    Primary Care Provider Office Phone # Fax #    Main Hardy [phone]       [address] [address]        Equal Access to Services     ANTHONY DORANTES AH: Hadii peyton ku hadasho Soomaali, waaxda luqadaha, qaybta kaalmada albinaegyada, igor miller . So [facility] [phone].    ATENCIÓN: Si habla español, tiene a shrestha disposición servicios gratuitos de asistencia lingüística. Anabelle al [phone].    We comply with applicable federal civil rights laws and [place] laws. We do not discriminate on the basis of race, color, national origin, age, disability sex, sexual orientation or gender identity.            Thank you!     Thank you for choosing [facility]  for your care. Our goal is always to provide you with excellent " care. Hearing back from our patients is one way we can continue to improve our services. Please take a few minutes to complete the written survey that you may receive in the mail after your visit with us. Thank you!             Your Updated Medication List - Protect others around you: Learn how to safely use, store and throw away your medicines at www.disposemymeds.org.          This list is accurate as of: 6/30/17  3:52 PM.  Always use your most recent med list.                   Brand Name Dispense Instructions for use Diagnosis    ASPIRIN EC PO      Take 81 mg by mouth daily        CARVEDILOL PO      Take 3.125 mg by mouth 2 times daily (with meals) Per pt dose was reduced from 6.25 a few months ago        NEURONTIN PO      Take 600 mg by mouth 3 times daily Takes 600 mg in AM, 600 mg around midday and 1200 mg at HS.        potassium chloride francis er    K-DUR     Take 10 mEq by mouth daily        torsemide 20 MG tablet    DEMADEX     Take 20 mg by mouth        warfarin 5 MG tablet    COUMADIN    40 tablet    Take 1 tablet (5 mg) by mouth daily Take 5 mg daily unless otherwise instructed by orthopedics.    Right knee DJD

## 2017-06-30 NOTE — PROGRESS NOTES
SUBJECTIVE:                                                    Antonio Ramirez is a 72 year old male who presents to clinic today for the following health issues:    Chief Complaint   Patient presents with     Follow Up For     Alcoholic cirrhosis of liver (H), CKD Stage III, CAD         Amount of exercise or physical activity: None    Problems taking medications regularly: No    Medication side effects: none    Diet: regular (no restrictions)      This is a pleasant 72-year-old retired  with multiple medical problems including history of prostate cancer, cirrhosis related to alcohol, coronary artery disease status post coronary artery bypass grafting, congestive heart failure, history of pulmonary embolism and antiphospholipid antibody syndrome, monoclonal gammopathy, low-grade lymphoma, chronic kidney disease, alcohol abuse. He is status post a left leg amputation. He was seen about one month ago to establish care. He has since seen cardiology who have recommended changing his diuretics from Bumex to torsemide. He has since had a paracentesis with 1 L of ascites fluid withdrawn with improvement in abdominal distention symptoms. His dyspnea symptoms have improved. He continues to have diffuse redness and intermittent pruritus in his right lower extremity. He plans on seeing a dermatologist to evaluate this further. He did see podiatry regarding his toe ulcer and his toe ulcer has been improving. He admits that he continues to drink a small amount of alcohol on a regular basis. He has not been exercising very much.  Today he feels well and is without specific complaints.    Problem list and histories reviewed & adjusted, as indicated.  Additional history: as documented    Patient Active Problem List   Diagnosis     Right knee DJD     Coronary atherosclerosis     Acute posthemorrhagic anemia     Alcohol abuse     Alcoholic cirrhosis of liver (H)     Chronic kidney disease, stage III (moderate)     Physical  deconditioning     Coronary artery disease involving native coronary artery of native heart without angina pectoris     Prostate cancer (H)     Antiphospholipid antibody syndrome (H)     History of amputation of right foot (H)     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 (H)     Thoracic aortic aneurysm without rupture (H)     Past Surgical History:   Procedure Laterality Date     AMPUTATION      (L) leg amputation     APPENDECTOMY       ARTHROPLASTY KNEE Right 9/19/2014    Procedure: ARTHROPLASTY KNEE;  Surgeon: Saima Howard MD;  Location:  OR     CARDIAC SURGERY      CABG     CHOLECYSTECTOMY       COLONOSCOPY       CYSTOSCOPY, DILATE URETHRA, COMBINED N/A 10/12/2016    Procedure: COMBINED CYSTOSCOPY, DILATE URETHRA;  Surgeon: Dimitry Bello MD;  Location:  OR     ENDOSCOPIC STRIPPING VEIN(S)       esophagogastroduodenoscopy       EYE SURGERY      cataracts     GENITOURINARY SURGERY      Prostate Seen Implants     HERNIA REPAIR      Umbilical     IR IVC FILTER PLACEMENT       ORTHOPEDIC SURGERY      (R) knee scope     ORTHOPEDIC SURGERY      total hip      ORTHOPEDIC SURGERY      elbow surgery       Social History   Substance Use Topics     Smoking status: Never Smoker     Smokeless tobacco: Not on file     Alcohol use Yes      Comment: quit October 2015; now drinking again 2 drinks per day     No family history on file.      Current Outpatient Prescriptions   Medication Sig Dispense Refill     torsemide (DEMADEX) 20 MG tablet Take 20 mg by mouth       potassium chloride francis er (K-DUR) Take 10 mEq by mouth daily       ASPIRIN EC PO Take 81 mg by mouth daily       Gabapentin (NEURONTIN PO) Take 600 mg by mouth 3 times daily Takes 600 mg in AM, 600 mg around midday and 1200 mg at HS.       warfarin (COUMADIN) 5 MG tablet Take 1 tablet (5 mg) by mouth daily Take 5 mg daily unless otherwise instructed by orthopedics. (Patient taking differently: Take 5 mg by mouth daily ) 40 tablet 0     CARVEDILOL  "PO Take 3.125 mg by mouth 2 times daily (with meals) Per pt dose was reduced from 6.25 a few months ago       Allergies   Allergen Reactions     Ciprofloxacin Itching     Severe itching, like ants were crawling     Hmg-Coa-R Inhibitors      Rhabdomyolysis       Reviewed and updated as needed this visit by clinical staff       Reviewed and updated as needed this visit by Provider         ROS:  Constitutional, HEENT, cardiovascular, pulmonary, gi and gu systems are negative, except as otherwise noted.    OBJECTIVE:     /59 (BP Location: Left arm, Cuff Size: Adult Large)  Pulse 59  Temp 97.3  F (36.3  C) (Tympanic)  Ht 6' 1\" (1.854 m)  Wt 254 lb (115.2 kg)  SpO2 94%  BMI 33.51 kg/m2  Body mass index is 33.51 kg/(m^2).  Gen.: This is an improved appearing man in no acute distress. He is in good spirits. HEENT: Mucous membranes are moist, the neck is supple without adenopathy. Pulmonary: The lungs are clear to auscultation bilaterally, breathing is not labored. Cardiovascular: The heart has a regular rate and rhythm. No appreciable murmur gallop or rub. Abdomen: Less distention, soft, not tender, there is a left-sided ventral hernia that is noted, there is no bulging flanks or shifting dullness today. Extremities: The left leg is status post amputation, the right leg is with 1+ edema to just below the knee. There are changes on the skin of the distal right lower extremity that are consistent with venous stasis dermatitis. The ulceration on the right great toe seems to be improving since last visit. Neurological: Alert and oriented person place and time, cranial nerves II-12 are grossly intact, grossly normal strength. Mental status: Normal mood and affect, mildly impaired insight and judgment    Diagnostic Test Results:  Results for orders placed or performed during the hospital encounter of 06/07/17   INR   Result Value Ref Range    INR 2.68 (H) 0.86 - 1.14   Platelet count   Result Value Ref Range    " Platelet Count 131 (L) 150 - 450 10e9/L       ASSESSMENT/PLAN:             1. Alcohol abuse  Again, he is counseled on complete alcohol cessation in the setting of known cirrhosis from alcohol    2. Alcoholic cirrhosis of liver with ascites (H)  He does have an appointment scheduled with Dr. Stratton from hepatology; his abdominal symptoms seem improved after paracentesis; decisions regarding further paracenteses can be made by hepatology after consultation    3. Chronic congestive heart failure, unspecified congestive heart failure type (H)  Continue follow-up with Dr. Box from cardiology regarding management of congestive heart failure; again his diuretics were changed from Bumex to torsemide today and Dr. Box has ordered follow-up labs     4. Antiphospholipid antibody syndrome (H)  Continue follow-up with Dr. Brown from hematology for management of Coumadin anticoagulation for prophylaxis for recurrent thromboembolism     5. Foot ulcer, right, with unspecified severity (H)  Continue follow-up with Dr. Bush as directed regarding the improving right great toe ulcer     6. Screen for colon cancer  I discussed colon cancer screening with the patient today. He did have a colonoscopy in [place] within the year showing no polyps and the presence of diverticulosis. He was told by his gastroenterologist in [place] that no further colon cancer screening was needed.    FUTURE APPOINTMENTS:       - Given the multitude of medical problems that he has, I think that three-month follow-up is appropriate, sooner pending symptoms or if new problems arise    Main Hardy MD  [facility]

## 2017-07-07 ENCOUNTER — TRANSFERRED RECORDS (OUTPATIENT)
Dept: HEALTH INFORMATION MANAGEMENT | Facility: CLINIC | Age: 72
End: 2017-07-07

## 2017-07-20 ENCOUNTER — TRANSFERRED RECORDS (OUTPATIENT)
Dept: HEALTH INFORMATION MANAGEMENT | Facility: CLINIC | Age: 72
End: 2017-07-20

## 2017-07-27 ENCOUNTER — HOSPITAL ENCOUNTER (OUTPATIENT)
Dept: ULTRASOUND IMAGING | Facility: CLINIC | Age: 72
End: 2017-07-27
Attending: INTERNAL MEDICINE | Admitting: COLON & RECTAL SURGERY
Payer: MEDICARE

## 2017-07-27 ENCOUNTER — HOSPITAL ENCOUNTER (OUTPATIENT)
Facility: CLINIC | Age: 72
Discharge: HOME | End: 2017-07-27
Admitting: RADIOLOGY
Payer: MEDICARE

## 2017-07-27 ENCOUNTER — TRANSFERRED RECORDS (OUTPATIENT)
Dept: HEALTH INFORMATION MANAGEMENT | Facility: CLINIC | Age: 72
End: 2017-07-27

## 2017-07-27 VITALS
HEART RATE: 61 BPM | RESPIRATION RATE: 16 BRPM | SYSTOLIC BLOOD PRESSURE: 129 MMHG | DIASTOLIC BLOOD PRESSURE: 79 MMHG | TEMPERATURE: 97.7 F | OXYGEN SATURATION: 94 %

## 2017-07-27 DIAGNOSIS — K70.31 ALCOHOLIC CIRRHOSIS OF LIVER WITH ASCITES (H): ICD-10-CM

## 2017-07-27 LAB
INR PPP: 1.94 (ref 0.86–1.14)
PLATELET # BLD AUTO: 143 10E9/L (ref 150–450)

## 2017-07-27 PROCEDURE — 85610 PROTHROMBIN TIME: CPT | Performed by: RADIOLOGY

## 2017-07-27 PROCEDURE — 27210190 US PARACENTESIS

## 2017-07-27 PROCEDURE — 85049 AUTOMATED PLATELET COUNT: CPT | Performed by: RADIOLOGY

## 2017-07-27 PROCEDURE — 40000863 ZZH STATISTIC RADIOLOGY XRAY, US, CT, MAR, NM

## 2017-07-27 RX ORDER — LIDOCAINE HYDROCHLORIDE 10 MG/ML
10 INJECTION, SOLUTION EPIDURAL; INFILTRATION; INTRACAUDAL; PERINEURAL ONCE
Status: COMPLETED | OUTPATIENT
Start: 2017-07-27 | End: 2017-07-27

## 2017-07-27 RX ORDER — POTASSIUM CHLORIDE 750 MG/1
10 TABLET, EXTENDED RELEASE ORAL DAILY
COMMUNITY
End: 2017-12-17

## 2017-07-27 RX ORDER — ALBUMIN (HUMAN) 12.5 G/50ML
12.5 SOLUTION INTRAVENOUS ONCE
Status: DISCONTINUED | OUTPATIENT
Start: 2017-07-27 | End: 2017-07-27 | Stop reason: HOSPADM

## 2017-07-27 RX ADMIN — LIDOCAINE HYDROCHLORIDE 100 MG: 10 INJECTION, SOLUTION EPIDURAL; INFILTRATION; INTRACAUDAL; PERINEURAL at 12:12

## 2017-07-27 NOTE — PROGRESS NOTES
1159 Time Out done.    Paracentesis: Pt tolerated well. VSS. 2800 cc dalia fluid removed from abdomen w/o difficulty. Bandaid applied to site - CDI. No Albumin as pt refused Albumin infusion.    1218 Pt back to [facility].  1230 Pt discharged per ambulatory. All personal belongings taken with pt.

## 2017-07-27 NOTE — PROGRESS NOTES
[facility] Arrival    Reason for Visit: Paracentesis    A/O. Denies pain. D/C instructions reviewed with pt with verbal understanding received. Copy given to pt.  All questions & concerns addressed. Pt resting on cart, denies additional needs at this time, call light within reach. No family present. Pt refuses IV start and Albumin infusion if needed d/t volume removed.     Pt states that his labs were drawn at [facility] - had a Factor 10 drawn & that the clinic stated that he would not need further labs. Per protocol, INR & Platelets must be drawn every 30 days ... and pt is on Coumadin. Explained to pt why labs are necessary & which labs are needed & why it is necessary to arrive 90 minutes prior to procedure when labs are needed. Verbal understanding received from pt. [facility]  Marli Donis informed of situation.    Pt states that he may have had MRSA in his left leg when his leg was amputated. Unable to verify.

## 2017-07-27 NOTE — DISCHARGE INSTRUCTIONS

## 2017-07-27 NOTE — IP AVS SNAPSHOT
MRN:[id number]                      After Visit Summary   7/27/2017    Antonio Ramirez    MRN: [id number]           Visit Information        Department      7/27/2017 10:06 AM [facility]          Review of your medicines      UNREVIEWED medicines. Ask your doctor about these medicines        Dose / Directions    ASPIRIN EC PO        Dose:  81 mg   Take 81 mg by mouth daily   Refills:  0       CARVEDILOL PO        Dose:  3.125 mg   Take 3.125 mg by mouth 2 times daily (with meals)   Refills:  0       NEURONTIN PO        Dose:  600 mg   Take 600 mg by mouth 3 times daily 600 mg in morning, 600 mg midday & 1200 mg at HS   Refills:  0       potassium chloride SA 10 MEQ CR tablet   Commonly known as:  K-DUR/KLOR-CON M        Dose:  10 mEq   Take 10 mEq by mouth daily   Refills:  0       torsemide 20 MG tablet   Commonly known as:  DEMADEX        Dose:  20 mg   Take 20 mg by mouth 2 times daily   Refills:  0       WARFARIN SODIUM PO        Dose:  5 mg   Take 5 mg by mouth daily   Refills:  0                Protect others around you: Learn how to safely use, store and throw away your medicines at www.disposemymeds.org.         Follow-ups after your visit         Care Instructions        Further instructions from your care team       Paracentesis Discharge Instructions     After you go home:      You may resume your normal diet.    Care of Puncture Site:      For the first 48 hrs, check your puncture site every couple hours while you are awake     If there is a bandaid - you may remove it tomorrow morning    You may shower tomorrow    No tub baths, whirlpools or swimming until your puncture site has fully healed    Fluid may leak from the site. Change the bandaid as needed - keep the site dry    If the fluid leaks for more than 48 hours, call your ordering provider     Activity:      You may go back to normal activity in 24 hours     Wait 48 hours before lifting, straining, exercise or  other strenuous activity    Medicines:      You may resume all your medications    For minor pain, you may take Acetaminophen (Tylenol) or Ibuprofen (Advil)            Call the provider who ordered this procedure if:      The site is red, swollen, hot or tender    Blood or fluid is draining from the site    Chills or a fever greater than 101 F (38 C)    Pain that is getting worse    Leaking from the site that does not stop    Any questions or concerns      If you have questions call:        [facility] Radiology Dept @ [phone]      The provider who performed your procedure was _________________.     Additional Information About Your Visit        MyChart Information     [facility] gives you secure access to your electronic health record. If you see a primary care provider, you can also send messages to your care team and make appointments. If you have questions, please call your primary care clinic.  If you do not have a primary care provider, please call [phone] and they will assist you.        Care EveryWhere ID     This is your Care EveryWhere ID. This could be used by other organizations to access your [facility] medical records  [id number]         Primary Care Provider Office Phone # Fax #    Main Hardy [phone]      Equal Access to Services     ANTHONY DORANTES : Evelin buio Sosylvia, waaxda luqadaha, qaybta kaalmada adeegyada, igor mix. So [facility] [phone].    ATENCIÓN: Si habla español, tiene a shrestha disposición servicios gratuitos de asistencia lingüística. Llame al [phone].    We comply with applicable federal civil rights laws and [place] laws. We do not discriminate on the basis of race, color, national origin, age, disability sex, sexual orientation or gender identity.            Thank you!     Thank you for choosing [facility] for your care. Our goal is always to provide you with excellent care. Hearing back from our  patients is one way we can continue to improve our services. Please take a few minutes to complete the written survey that you may receive in the mail after you visit with us. Thank you!             Medication List: This is a list of all your medications and when to take them. Check marks below indicate your daily home schedule. Keep this list as a reference.      Medications           Morning Afternoon Evening Bedtime As Needed    ASPIRIN EC PO   Take 81 mg by mouth daily                                CARVEDILOL PO   Take 3.125 mg by mouth 2 times daily (with meals)                                NEURONTIN PO   Take 600 mg by mouth 3 times daily 600 mg in morning, 600 mg midday & 1200 mg at HS                                potassium chloride SA 10 MEQ CR tablet   Commonly known as:  K-DUR/KLOR-CON M   Take 10 mEq by mouth daily                                torsemide 20 MG tablet   Commonly known as:  DEMADEX   Take 20 mg by mouth 2 times daily                                WARFARIN SODIUM PO   Take 5 mg by mouth daily

## 2017-07-27 NOTE — PROGRESS NOTES
RADIOLOGY PROCEDURE NOTE  Patient name: Antonio Ramirez  MRN: [id number]  : 1945    Pre-procedure diagnosis: Ascites.  Post-procedure diagnosis: Same    Procedure Date/Time: 2017  12:05 PM  Procedure: Right lower quadrant paracentesis.  Estimated blood loss: None  Specimen(s) collected with description: Ascites.  The patient tolerated the procedure well with no immediate complications.    See imaging dictation for procedural details and findings.    Provider name: Juarez Cardona  Assistant(s):None

## 2017-07-27 NOTE — IP AVS SNAPSHOT
[place] [address]    [place] [address]    Phone:  [phone]                                       After Visit Summary   7/27/2017    Antonio Ramirez    MRN: [id number]           After Visit Summary Signature Page     I have received my discharge instructions, and my questions have been answered. I have discussed any challenges I see with this plan with the nurse or doctor.    ..........................................................................................................................................  Patient/Patient Representative Signature      ..........................................................................................................................................  Patient Representative Print Name and Relationship to Patient    ..................................................               ................................................  Date                                            Time    ..........................................................................................................................................  Reviewed by Signature/Title    ...................................................              ..............................................  Date                                                            Time

## 2017-08-09 ENCOUNTER — TRANSFERRED RECORDS (OUTPATIENT)
Dept: HEALTH INFORMATION MANAGEMENT | Facility: CLINIC | Age: 72
End: 2017-08-09

## 2017-08-31 ENCOUNTER — OFFICE VISIT (OUTPATIENT)
Dept: FAMILY MEDICINE | Facility: CLINIC | Age: 72
End: 2017-08-31
Payer: MEDICARE

## 2017-08-31 VITALS
HEART RATE: 50 BPM | WEIGHT: 235 LBS | TEMPERATURE: 98 F | DIASTOLIC BLOOD PRESSURE: 76 MMHG | OXYGEN SATURATION: 97 % | BODY MASS INDEX: 31 KG/M2 | SYSTOLIC BLOOD PRESSURE: 136 MMHG

## 2017-08-31 DIAGNOSIS — S80.812A ABRASION OF LEG, LEFT, INITIAL ENCOUNTER: Primary | ICD-10-CM

## 2017-08-31 PROCEDURE — 99213 OFFICE O/P EST LOW 20 MIN: CPT | Performed by: NURSE PRACTITIONER

## 2017-08-31 NOTE — PROGRESS NOTES
SUBJECTIVE:   Antonio Ramirez is a 72 year old male who presents to clinic today for the following health issues:      Chief Complaint   Patient presents with     Trauma     patient has amputated L leg just below knee and has been having chaffing and bleeding of the inner portion of the stump below the knee   He is now on coumadin   He sees his prosthetician every 6 months.  Has not been wearing a protective stocking at all times     Problem list and histories reviewed & adjusted, as indicated.  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)     Thoracic aortic aneurysm without rupture (H)     Chronic congestive heart failure, unspecified congestive heart failure type (H)     Past Surgical History:   Procedure Laterality Date     AMPUTATION      (L) leg amputation     APPENDECTOMY       ARTHROPLASTY KNEE Right 9/19/2014    Procedure: ARTHROPLASTY KNEE;  Surgeon: Saima Howard MD;  Location:  OR     CARDIAC SURGERY      CABG     CHOLECYSTECTOMY       COLONOSCOPY       CYSTOSCOPY, DILATE URETHRA, COMBINED N/A 10/12/2016    Procedure: COMBINED CYSTOSCOPY, DILATE URETHRA;  Surgeon: Dimitry Bello MD;  Location:  OR     ENDOSCOPIC STRIPPING VEIN(S)       esophagogastroduodenoscopy       EYE SURGERY      cataracts     GENITOURINARY SURGERY      Prostate Seen Implants     HERNIA REPAIR      Umbilical     IR IVC FILTER PLACEMENT       ORTHOPEDIC SURGERY      (R) knee scope     ORTHOPEDIC SURGERY      total hip  "     ORTHOPEDIC SURGERY      elbow surgery       Social History   Substance Use Topics     Smoking status: Never Smoker     Smokeless tobacco: Not on file     Alcohol use Yes      Comment: quit October 2015; now drinking again 2 drinks per day     No family history on file.      Current Outpatient Prescriptions   Medication Sig Dispense Refill     potassium chloride SA (K-DUR/KLOR-CON M) 10 MEQ CR tablet Take 10 mEq by mouth daily       WARFARIN SODIUM PO Take 5 mg by mouth daily       torsemide (DEMADEX) 20 MG tablet Take 20 mg by mouth 2 times daily        ASPIRIN EC PO Take 81 mg by mouth daily       Gabapentin (NEURONTIN PO) Take 600 mg by mouth 3 times daily 600 mg in morning, 600 mg midday & 1200 mg at HS       CARVEDILOL PO Take 3.125 mg by mouth 2 times daily (with meals)        Allergies   Allergen Reactions     Hmg-Coa-R Inhibitors Other (See Comments)     Rhabdomyolysis occurred within a couple days     Ciprofloxacin Itching     Severe itching \"like ants were crawling\"         Reviewed and updated as needed this visit by clinical staffTobacco  Allergies  Meds  Problems       Reviewed and updated as needed this visit by Provider  Allergies  Meds  Problems         ROS:  Constitutional, HEENT, cardiovascular, pulmonary, gi and gu systems are negative, except as otherwise noted.      OBJECTIVE:   /76 (BP Location: Right arm, Patient Position: Chair, Cuff Size: Adult Large)  Pulse 50  Temp 98  F (36.7  C) (Oral)  Wt 235 lb (106.6 kg)  SpO2 97%  BMI 31 kg/m2  Body mass index is 31 kg/(m^2).  GENERAL: healthy, alert and no distress  MS: no gross musculoskeletal defects noted, no edema, left BK amputation, medial stump below the knee has venous varicosities, hyperpigmentation from wearing and rubbing of the prosthetic, small superficial abraded area with trickle of blood, cleansed , covered with bacitracin and dressing applied   SKIN: no suspicious lesions or rashes, no cellulitis  PSYCH: " mentation appears normal, affect normal/bright    Diagnostic Test Results:  none     ASSESSMENT/PLAN:         ICD-10-CM    1. Abrasion of leg, left, initial encounter S80.812A    advised mr Ramirez to always wear protective garment when wearing his prosthetic to wick moisture and pad skin against the rubbing of the plastic on that area cosme with varicose veins and current coumadin   Cleanse daily with warm soapy water and apply bacitracin and small padded dressing  Always be certain that there is no bunching up of linings and dressings in area of hyperpigmentation to reduce trauma of the tissue       DIPAK Schneider [facility]

## 2017-08-31 NOTE — MR AVS SNAPSHOT
After Visit Summary   8/31/2017    Antonio Ramirez    MRN: [id number]           Patient Information     Date Of Birth          1945        Visit Information        Provider Department      8/31/2017 1:00 PM Elvia Suarez APRN CNP [facility]        Today's Diagnoses     Abrasion of leg, left, initial encounter    -  1       Follow-ups after your visit        Follow-up notes from your care team     Return if symptoms worsen or fail to improve.      Who to contact     If you have questions or need follow up information about today's clinic visit or your schedule please contact [facility] directly at [phone].  Normal or non-critical lab and imaging results will be communicated to you by MyChart, letter or phone within 4 business days after the clinic has received the results. If you do not hear from us within 7 days, please contact the clinic through [employer] or phone. If you have a critical or abnormal lab result, we will notify you by phone as soon as possible.  Submit refill requests through [employer] or call your pharmacy and they will forward the refill request to us. Please allow 3 business days for your refill to be completed.          Additional Information About Your Visit        MyChart Information     [employer] gives you secure access to your electronic health record. If you see a primary care provider, you can also send messages to your care team and make appointments. If you have questions, please call your primary care clinic.  If you do not have a primary care provider, please call [phone] and they will assist you.        Care EveryWhere ID     This is your Care EveryWhere ID. This could be used by other organizations to access your [place] medical records  [id number]        Your Vitals Were     Pulse Temperature Pulse Oximetry BMI (Body Mass Index)          50 98  F (36.7  C) (Oral) 97% 31 kg/m2         Blood Pressure from Last 3 Encounters:    08/31/17 136/76   07/27/17 129/79   06/30/17 110/59    Weight from Last 3 Encounters:   08/31/17 235 lb (106.6 kg)   06/30/17 254 lb (115.2 kg)   06/27/17 246 lb (111.6 kg)              Today, you had the following     No orders found for display       Primary Care Provider Office Phone # Fax #    Main CARA Hardy [phone]       [facility] [address] [facility] [address]  [facility] [address]        Equal Access to Services     [facility]: Hadii aad ku hadasho Soomaali, waaxda luqadaha, qaybta kaalmada adeegyada, igor miller . So [facility] [phone].    ATENCIÓN: Si habla español, tiene a shrestha disposición servicios gratuitos de asistencia lingüística. [facility] [phone].    We comply with applicable federal civil rights laws and [place] laws. We do not discriminate on the basis of race, color, national origin, age, disability sex, sexual orientation or gender identity.            Thank you!     Thank you for choosing [facility]  for your care. Our goal is always to provide you with excellent care. Hearing back from our patients is one way we can continue to improve our services. Please take a few minutes to complete the written survey that you may receive in the mail after your visit with us. Thank you!             Your Updated Medication List - Protect others around you: Learn how to safely use, store and throw away your medicines at www.disposemymeds.org.          This list is accurate as of: 8/31/17  4:41 PM.  Always use your most recent med list.                   Brand Name Dispense Instructions for use Diagnosis    ASPIRIN EC PO      Take 81 mg by mouth daily        CARVEDILOL PO      Take 3.125 mg by mouth 2 times daily (with meals)        NEURONTIN PO      Take 600 mg by mouth 3 times daily 600 mg in morning, 600 mg midday & 1200 mg at HS        potassium chloride SA 10 MEQ CR tablet    K-DUR/KLOR-CON M     Take 10 mEq by mouth daily         torsemide 20 MG tablet    DEMADEX     Take 20 mg by mouth 2 times daily        WARFARIN SODIUM PO      Take 5 mg by mouth daily

## 2017-09-14 ENCOUNTER — TRANSFERRED RECORDS (OUTPATIENT)
Dept: HEALTH INFORMATION MANAGEMENT | Facility: CLINIC | Age: 72
End: 2017-09-14

## 2017-09-15 ENCOUNTER — DOCUMENTATION ONLY (OUTPATIENT)
Dept: LAB | Facility: CLINIC | Age: 72
End: 2017-09-15

## 2017-09-15 DIAGNOSIS — I82.4Y9 DEEP VEIN THROMBOSIS (DVT) OF PROXIMAL LOWER EXTREMITY, UNSPECIFIED CHRONICITY, UNSPECIFIED LATERALITY (H): Primary | ICD-10-CM

## 2017-09-15 NOTE — TELEPHONE ENCOUNTER
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

## 2017-09-15 NOTE — PROGRESS NOTES
Pt is having a Cortizone shot on 9/19 at 3:00pm.  [facility] requires an INR to be done right before procedure.   For convenience, he would like to have this drawn here in our lab. No dosing needed.     Latasha Joe RN

## 2017-09-15 NOTE — PROGRESS NOTES
Patient is scheduled for lab only on 9/19/17. Please review and place future orders of testing that are to be completed at that time.    Lab note state it is for an INR.    Rekha

## 2017-09-15 NOTE — PROGRESS NOTES
Per Dr. 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

## 2017-09-19 DIAGNOSIS — I82.4Y9 DEEP VEIN THROMBOSIS (DVT) OF PROXIMAL LOWER EXTREMITY, UNSPECIFIED CHRONICITY, UNSPECIFIED LATERALITY (H): ICD-10-CM

## 2017-09-19 LAB — INR PPP: 1.67 (ref 0.86–1.14)

## 2017-09-19 PROCEDURE — 85610 PROTHROMBIN TIME: CPT | Performed by: INTERNAL MEDICINE

## 2017-09-19 PROCEDURE — 36415 COLL VENOUS BLD VENIPUNCTURE: CPT | Performed by: INTERNAL MEDICINE

## 2017-09-25 ENCOUNTER — OFFICE VISIT (OUTPATIENT)
Dept: FAMILY MEDICINE | Facility: CLINIC | Age: 72
End: 2017-09-25
Payer: MEDICARE

## 2017-09-25 VITALS
WEIGHT: 225 LBS | OXYGEN SATURATION: 97 % | HEIGHT: 73 IN | SYSTOLIC BLOOD PRESSURE: 90 MMHG | DIASTOLIC BLOOD PRESSURE: 55 MMHG | TEMPERATURE: 95.9 F | BODY MASS INDEX: 29.82 KG/M2 | HEART RATE: 65 BPM

## 2017-09-25 DIAGNOSIS — Z23 NEED FOR PROPHYLACTIC VACCINATION AND INOCULATION AGAINST INFLUENZA: ICD-10-CM

## 2017-09-25 DIAGNOSIS — L89.40: Primary | ICD-10-CM

## 2017-09-25 DIAGNOSIS — Z89.431 HISTORY OF AMPUTATION OF RIGHT FOOT (H): ICD-10-CM

## 2017-09-25 PROCEDURE — 99214 OFFICE O/P EST MOD 30 MIN: CPT | Performed by: INTERNAL MEDICINE

## 2017-09-25 PROCEDURE — G0008 ADMIN INFLUENZA VIRUS VAC: HCPCS | Performed by: INTERNAL MEDICINE

## 2017-09-25 PROCEDURE — 90662 IIV NO PRSV INCREASED AG IM: CPT | Performed by: INTERNAL MEDICINE

## 2017-09-25 NOTE — PROGRESS NOTES
SUBJECTIVE:   Antonio Ramirez is a 72 year old male who presents to clinic today for the following health issues:    Chief Complaint   Patient presents with     Sore     prosthetic is rubbing on back of left leg - causing a sore           Duration: 2 weeks     Description (location/character/radiation): prosthetic is rubbing on back of left leg - causing a sore    Intensity:  10/10    Accompanying signs and symptoms: prosthetic leg    History (similar episodes/previous evaluation): None    Precipitating or alleviating factors: None    Therapies tried and outcome: None       72-year-old man with a history of left below-the-knee amputation, also with a history of peripheral neuropathy and chronic liver disease presents with a more than one month history of ulcerations on the posterior aspect of his left amputation site. He was seen in this clinic several weeks ago.  His skin changes are not improving. He is most bothered by losing of blood from shallow ulcerations on the posterior aspect of his leg. He has been covering the ulcerations with bacitracin and covering the bacitracin with dry gauze.  He has been washing the wound daily with soap and water. He saw his prosthetist on Friday who cut away part of the prosthetic leg that was causing rubbing on the posterior aspect of his amputation site. He denies fevers or chills.      Problem list and histories reviewed & adjusted, as indicated.  Additional history: as documented    Patient Active Problem List   Diagnosis     Right knee DJD     Coronary atherosclerosis     Acute posthemorrhagic anemia     Alcohol abuse     Alcoholic cirrhosis of liver (H)     Chronic kidney disease, stage III (moderate)     Physical deconditioning     Coronary artery disease involving native coronary artery of native heart without angina pectoris     Prostate cancer (H)     Antiphospholipid antibody syndrome (H)     History of amputation of right foot (H)     Diffuse large B-cell lymphoma  of extranodal site (H)     Thoracic aortic aneurysm without rupture (H)     Chronic congestive heart failure, unspecified congestive heart failure type (H)     Past Surgical History:   Procedure Laterality Date     AMPUTATION      (L) leg amputation     APPENDECTOMY       ARTHROPLASTY KNEE Right 9/19/2014    Procedure: ARTHROPLASTY KNEE;  Surgeon: Saima Howard MD;  Location:  OR     CARDIAC SURGERY      CABG     CHOLECYSTECTOMY       COLONOSCOPY       CYSTOSCOPY, DILATE URETHRA, COMBINED N/A 10/12/2016    Procedure: COMBINED CYSTOSCOPY, DILATE URETHRA;  Surgeon: Dimitry Bello MD;  Location:  OR     ENDOSCOPIC STRIPPING VEIN(S)       esophagogastroduodenoscopy       EYE SURGERY      cataracts     GENITOURINARY SURGERY      Prostate Seen Implants     HERNIA REPAIR      Umbilical     IR IVC FILTER PLACEMENT       ORTHOPEDIC SURGERY      (R) knee scope     ORTHOPEDIC SURGERY      total hip      ORTHOPEDIC SURGERY      elbow surgery       Social History   Substance Use Topics     Smoking status: Never Smoker     Smokeless tobacco: Not on file     Alcohol use Yes      Comment: quit October 2015; now drinking again 2 drinks per day     No family history on file.      Current Outpatient Prescriptions   Medication Sig Dispense Refill     evolocumab (REPATHA) 140 MG/ML prefilled syringe Inject 1 pen subcutaneously every 2 weeks. For sample use only       potassium chloride SA (K-DUR/KLOR-CON M) 10 MEQ CR tablet Take 10 mEq by mouth daily       WARFARIN SODIUM PO Take 5 mg by mouth daily       torsemide (DEMADEX) 20 MG tablet Take 20 mg by mouth 2 times daily        ASPIRIN EC PO Take 81 mg by mouth daily       Gabapentin (NEURONTIN PO) Take 600 mg by mouth 3 times daily 600 mg in morning, 600 mg midday & 1200 mg at HS       CARVEDILOL PO Take 3.125 mg by mouth 2 times daily (with meals)        Allergies   Allergen Reactions     Hmg-Coa-R Inhibitors Other (See Comments)     Rhabdomyolysis occurred within a couple  "days     Ciprofloxacin Itching     Severe itching \"like ants were crawling\"         Reviewed and updated as needed this visit by clinical staff     Reviewed and updated as needed this visit by Provider         ROS:  Constitutional, HEENT, cardiovascular, pulmonary, gi and gu systems are negative, except as otherwise noted.      OBJECTIVE:   BP 90/55 (BP Location: Right arm, Cuff Size: Adult Regular)  Pulse 65  Temp 95.9  F (35.5  C) (Tympanic)  Ht 6' 1\" (1.854 m)  Wt 225 lb (102.1 kg)  SpO2 97%  BMI 29.69 kg/m2  Body mass index is 29.69 kg/(m^2).  Gen.: This is a well-appearing man in no acute distress. He does not appear toxic. Extremity: There are 2 shallow ulcerations on the posterior aspect of the left below the knee appendectomy patient's site. These ulcers are oozingbright red blood.  There is no surrounding areas of erythema or fluctuance to suggest infection. The skin appears to be slightly bruised most likely from rubbing against the prosthesis. There is also some flaking of the adjacent skin to the ulcers.        ASSESSMENT/PLAN:         ICD-10-CM    1. Pressure ulcer of contiguous region involving back and hip, unspecified laterality, unspecified ulcer stage L89.40 WOUND CARE REFERRAL   2. History of amputation of right foot (H) Z89.431      This patient presents with what seems like a pressure ulcerations that his amputation site. His prosthetic specialist as already made adjustments to his prosthesis to alleviate pressure. However, he may need some assistance to help with wound healing. He is on anticoagulation for his history of antiphospholipid antibody syndrome and venous thromboembolism. The Coumadin is likely making his skin thinner and more fragile. Also, the anticoagulation is the likely cause of the losing from the shallow ulcerations. I do not see any evidence of current infection. I do not see any evidence of current vascular compromise. I suggested that he do daily dressing changes with " Vaseline impregnated gauze over the ulceration sites. He should continue to watch the sites with soap and water gently on a daily basis. He will cover the Vaseline gauze with dry gauze. It was recommended that he try to stay out of his prosthesis as much as possible and we strategized some methods to help him with this. Finally, I have made a referral to the wound care clinic for an expert opinion about how to get these shallow ulcerations healed quickly and prevent complications such as infection.     I did teach the patient and his wife how to dress the wound today in clinic. Total face-to-face time was more than 28 minutes.      FUTURE APPOINTMENTS:       - Pending progress with wound healing and consultation with wound care clinic.    Main Hardy MD  [facility]

## 2017-09-25 NOTE — MR AVS SNAPSHOT
After Visit Summary   9/25/2017    Antonio Ramirez    MRN: [id number]           Patient Information     Date Of Birth          1945        Visit Information        Provider Department      9/25/2017 9:00 AM Main Hardy MD [facility]        Today's Diagnoses     Pressure ulcer of contiguous region involving back and hip, unspecified laterality, unspecified ulcer stage    -  1    History of amputation of right foot (H)           Follow-ups after your visit        Additional Services     WOUND CARE REFERRAL       Your provider has referred you to: [place]:  [facility] [place] [facility] [phone] FAX REFERRAL TO [phone] [email or web address]    Reason for referral: Wound care      1. [facility] Wound Consult appointment is related to what kind of wound: Pressure Ulcer    2. Location of wound: Lower extremity    3. Reason for referral: Assess and treat as indicated    4. Desired treatment if any: Per [facility] nurse     Please be aware that coverage of these services is subject to the terms and limitations of your health insurance plan.  Call member services at your health plan with any benefit or coverage questions.      Please bring the following with you to your appointment:    (1) Any X-Rays, CTs or MRIs which have been performed.  Contact the facility where they were done to arrange for  prior to your scheduled appointment.    (2) List of current medications   (3) This referral request   (4) Any documents/labs given to you for this referral                  Who to contact     If you have questions or need follow up information about today's clinic visit or your schedule please contact [facility] directly at [phone].  Normal or non-critical lab and imaging results will be communicated to you by MyChart, letter or phone within 4 business days after the clinic has received the results. If you do not hear from us within 7  "days, please contact the clinic through "[employer] or phone. If you have a critical or abnormal lab result, we will notify you by phone as soon as possible.  Submit refill requests through "[employer] or call your pharmacy and they will forward the refill request to us. Please allow 3 business days for your refill to be completed.          Additional Information About Your Visit        [employer] Information     "[employer] gives you secure access to your electronic health record. If you see a primary care provider, you can also send messages to your care team and make appointments. If you have questions, please call your primary care clinic.  If you do not have a primary care provider, please call [phone] and they will assist you.        Care EveryWhere ID     This is your Care EveryWhere ID. This could be used by other organizations to access your [place] medical records  [id number]        Your Vitals Were     Pulse Temperature Height Pulse Oximetry BMI (Body Mass Index)       65 95.9  F (35.5  C) (Tympanic) 6' 1\" (1.854 m) 97% 29.69 kg/m2        Blood Pressure from Last 3 Encounters:   09/25/17 90/55   08/31/17 136/76   07/27/17 129/79    Weight from Last 3 Encounters:   09/25/17 225 lb (102.1 kg)   08/31/17 235 lb (106.6 kg)   06/30/17 254 lb (115.2 kg)              We Performed the Following     WOUND CARE REFERRAL        Primary Care Provider Office Phone # Fax #    Main Hardy [phone]       [facility] [address] [facility] [address]  [facility] [address]        Equal Access to Services     [facility]: Hadii aad ku hadasho Soomaali, waaxda luqadaha, qaybta kaalmada adeegyada, igor miller . So [facility] [phone].    ATENCIÓN: Si habla español, tiene a shrestha disposición servicios gratuitos de asistencia lingüística. Llame al [phone].    We comply with applicable federal civil rights laws and [place] laws. We do not discriminate on the basis of race, color, " national origin, age, disability sex, sexual orientation or gender identity.            Thank you!     Thank you for choosing [facility]  for your care. Our goal is always to provide you with excellent care. Hearing back from our patients is one way we can continue to improve our services. Please take a few minutes to complete the written survey that you may receive in the mail after your visit with us. Thank you!             Your Updated Medication List - Protect others around you: Learn how to safely use, store and throw away your medicines at www.disposemymeds.org.          This list is accurate as of: 9/25/17 10:05 AM.  Always use your most recent med list.                   Brand Name Dispense Instructions for use Diagnosis    ASPIRIN EC PO      Take 81 mg by mouth daily        CARVEDILOL PO      Take 3.125 mg by mouth 2 times daily (with meals)        evolocumab 140 MG/ML prefilled syringe    REPATHA     Inject 1 pen subcutaneously every 2 weeks. For sample use only        NEURONTIN PO      Take 600 mg by mouth 3 times daily 600 mg in morning, 600 mg midday & 1200 mg at HS        potassium chloride SA 10 MEQ CR tablet    K-DUR/KLOR-CON M     Take 10 mEq by mouth daily        torsemide 20 MG tablet    DEMADEX     Take 20 mg by mouth 2 times daily        WARFARIN SODIUM PO      Take 5 mg by mouth daily

## 2017-09-25 NOTE — NURSING NOTE
"Chief Complaint   Patient presents with     Sore     prosthetic is rubbing on back of left leg - causing a sore       Initial BP 90/55 (BP Location: Right arm, Cuff Size: Adult Regular)  Pulse 65  Temp 95.9  F (35.5  C) (Tympanic)  Ht 6' 1\" (1.854 m)  Wt 225 lb (102.1 kg)  SpO2 97%  BMI 29.69 kg/m2 Estimated body mass index is 29.69 kg/(m^2) as calculated from the following:    Height as of this encounter: 6' 1\" (1.854 m).    Weight as of this encounter: 225 lb (102.1 kg).  Medication Reconciliation: complete  Nicole Ramos MA      "

## 2017-09-28 ENCOUNTER — HOSPITAL ENCOUNTER (OUTPATIENT)
Dept: WOUND CARE | Facility: CLINIC | Age: 72
Discharge: HOME | End: 2017-09-28
Attending: INTERNAL MEDICINE | Admitting: INTERNAL MEDICINE
Payer: MEDICARE

## 2017-09-28 VITALS
DIASTOLIC BLOOD PRESSURE: 63 MMHG | SYSTOLIC BLOOD PRESSURE: 116 MMHG | TEMPERATURE: 98.4 F | HEART RATE: 71 BPM | RESPIRATION RATE: 16 BRPM

## 2017-09-28 DIAGNOSIS — L89.893 PRESSURE ULCER OF LEFT CALF, STAGE 3 (H): Primary | ICD-10-CM

## 2017-09-28 PROCEDURE — A6212 FOAM DRG <=16 SQ IN W/BORDER: HCPCS

## 2017-09-28 PROCEDURE — A6248 HYDROGEL DRSG GEL FILLER: HCPCS

## 2017-09-28 PROCEDURE — 99212 OFFICE O/P EST SF 10 MIN: CPT | Performed by: PHYSICIAN ASSISTANT

## 2017-09-28 PROCEDURE — 97602 WOUND(S) CARE NON-SELECTIVE: CPT

## 2017-09-28 NOTE — PROGRESS NOTES
[facility]    REFERRING PROVIDER: Main Weiner    HISTORY OF PRESENT ILLNESS: Mr. Antonio Ramirez is a 72-year-old gentleman who presents to the [facility] today for ulcerations on his left lower leg where his prosthetic rubs. He has already seen his prosthetist who has modified his prosthetic somewhat but was not able to completely remove the area that is rubbing. Sage has been staying off of this leg as much as possible and thinks that the ulcers have been subsequently improving. Has received mixed information on how to care for these wounds and was originally told to leave them open to air but was most recently told to cover them with vaseline gauze.    WOUND CARE: Currently dressing the wound with vaseline gauze.     DATE WOUND ACQUIRED: 8/2017    PAST MEDICAL HISTORY:   Past Medical History:   Diagnosis Date     Alcoholism (H)      Amputated left leg (H)      Anemia      Anticardiolipin antibody syndrome (H)      Antiplatelet or antithrombotic long-term use      Aortic stenosis      Arthritis      Balance problem      Cardiomyopathy (H)      Cardiomyopathy (H)      Coagulation disorder (H)     cardiolipinantibody syndrome     Coronary artery disease      Gastro-oesophageal reflux disease      Heart attack (H)      Heart murmur     Mitral Regurgitation, Moderate     Hiatal hernia      Hypercholesterolemia      Hypertension      Left foot drop      Liver disease     alcoholic liver disease, alcoholic cirrohsosis, portal hypertension     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)      Moderate mitral regurgitation      Monoclonal gammopathy      Neuropathy (H)      Neuropathy (H)      Noninfectious ileitis     diverticulitis of colon     PE (pulmonary embolism) 2006     Phlebitis and thrombophlebitis      Prostate cancer (H)      Renal disease     kidney stones     Syncope      Thoracic aneurysm      Thoracic aortic aneurysm, without rupture (H)      Thrombocytopenia (H)      Thrombosis  of leg      Urethral stricture        SOCIAL HISTORY:  but he typically performs his own dressing changes    MEDICATIONS:   Current Outpatient Prescriptions   Medication     order for DME     evolocumab (REPATHA) 140 MG/ML prefilled syringe     potassium chloride SA (K-DUR/KLOR-CON M) 10 MEQ CR tablet     WARFARIN SODIUM PO     torsemide (DEMADEX) 20 MG tablet     ASPIRIN EC PO     Gabapentin (NEURONTIN PO)     CARVEDILOL PO     No current facility-administered medications for this encounter.        REVIEW OF SYSTEMS:  CONSTITUTIONAL: Denies fevers or acute illness  ENDOCRINE: Denies diabetes    VITALS: /63 (BP Location: Left arm)  Pulse 71  Temp 98.4  F (36.9  C) (Temporal)  Resp 16    PHYSICAL EXAM:  GENERAL: Patient is alert and oriented and in no acute distress  INTEGUMENTARY:   WOUND ASSESSMENT:     Location: left lateral lower leg     Stage/Thickness: Full    Size: 0.9 cm x 1.5 cm with a depth of 0.1 cm    Drainage: moderate amount of serosanguinous drainage    Wound description: clean and granular  WOUND ASSESSMENT #2:     Location: left medial lower leg     Stage/Thickness: Full    Size: 2 cm x 1.2 cm with a depth of 0.1 cm    Drainage: moderate amount of serosanguinous drainage    Wound description: clean and granular    PROCEDURE: Per standing order, topical 4% lidocaine was applied to the wound by the CMA. The wound was mechanically debrided.     ASSESSMENT: stage 3 pressure ulcerations of left lower leg    PLAN:   1. Primary dressing: Woun'Dres Gel; Secondary dressing: Mepilex Border  2. Recommended wearing sock and prosthetic liner but keeping his prosthesis off as much as possible as this will be the most important component of healing the wound.     FOLLOW-UP: 3-4 weeks    ADAM AMOS PA-C

## 2017-10-19 ENCOUNTER — HOSPITAL ENCOUNTER (OUTPATIENT)
Dept: WOUND CARE | Facility: CLINIC | Age: 72
Discharge: HOME | End: 2017-10-19
Attending: PHYSICIAN ASSISTANT | Admitting: PHYSICIAN ASSISTANT
Payer: MEDICARE

## 2017-10-19 VITALS
HEART RATE: 73 BPM | DIASTOLIC BLOOD PRESSURE: 73 MMHG | SYSTOLIC BLOOD PRESSURE: 136 MMHG | TEMPERATURE: 98.6 F | RESPIRATION RATE: 16 BRPM

## 2017-10-19 DIAGNOSIS — L89.893 PRESSURE ULCER OF LEFT CALF, STAGE 3 (H): ICD-10-CM

## 2017-10-19 PROCEDURE — A6209 FOAM DRSG <=16 SQ IN W/O BDR: HCPCS

## 2017-10-19 PROCEDURE — 99213 OFFICE O/P EST LOW 20 MIN: CPT | Performed by: PHYSICIAN ASSISTANT

## 2017-10-19 PROCEDURE — A6248 HYDROGEL DRSG GEL FILLER: HCPCS

## 2017-10-19 PROCEDURE — 97602 WOUND(S) CARE NON-SELECTIVE: CPT

## 2017-10-23 NOTE — PROGRESS NOTES
[facility]    REFERRING PROVIDER: Main Weiner    HISTORY OF PRESENT ILLNESS: Mr. Antonio Ramirez is a 72-year-old gentleman who returns to the [facility] today for ulcerations on his left lower leg where his prosthetic rubs. He has seen his prosthetist who has modified his prosthetic somewhat but was not able to completely remove the area that is rubbing. Last visit we recommended that Sage keep his prosthetic off as much as possible while these wounds heal. He has seen improvement in the last couple of weeks.     WOUND CARE: Currently dressing the wound with WounDres gel and Mepliex border foam.    DATE WOUND ACQUIRED: 8/2017    PAST MEDICAL HISTORY:   Past Medical History:   Diagnosis Date     Alcoholism (H)      Amputated left leg (H)      Anemia      Anticardiolipin antibody syndrome (H)      Antiplatelet or antithrombotic long-term use      Aortic stenosis      Arthritis      Balance problem      Cardiomyopathy (H)      Cardiomyopathy (H)      Coagulation disorder (H)     cardiolipinantibody syndrome     Coronary artery disease      Gastro-oesophageal reflux disease      Heart attack      Heart murmur     Mitral Regurgitation, Moderate     Hiatal hernia      Hypercholesterolemia      Hypertension      Left foot drop      Liver disease     alcoholic liver disease, alcoholic cirrohsosis, portal hypertension     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)      Moderate mitral regurgitation      Monoclonal gammopathy      Neuropathy      Neuropathy      Noninfectious ileitis     diverticulitis of colon     PE (pulmonary embolism) 2006     Phlebitis and thrombophlebitis      Prostate cancer (H)      Renal disease     kidney stones     Syncope      Thoracic aneurysm      Thoracic aortic aneurysm, without rupture (H)      Thrombocytopenia (H)      Thrombosis of leg      Urethral stricture      SOCIAL HISTORY:  but he typically performs his own dressing changes    MEDICATIONS:   Current  Outpatient Prescriptions   Medication     evolocumab (REPATHA) 140 MG/ML prefilled syringe     potassium chloride SA (K-DUR/KLOR-CON M) 10 MEQ CR tablet     WARFARIN SODIUM PO     torsemide (DEMADEX) 20 MG tablet     ASPIRIN EC PO     Gabapentin (NEURONTIN PO)     CARVEDILOL PO     order for DME     No current facility-administered medications for this encounter.      VITALS: /73 (BP Location: Left arm)  Pulse 73  Temp 98.6  F (37  C) (Temporal)  Resp 16    PHYSICAL EXAM:  GENERAL: Patient is alert and oriented and in no acute distress  INTEGUMENTARY:   WOUND ASSESSMENT:     Location: left lateral lower leg     Size: 0.5 cm x 0.5 cm with a depth of 0.1 cm    Drainage: moderate amount of serosanguinous drainage    Wound description: clean and granular, decreased in size  WOUND ASSESSMENT #2:     Location: left medial lower leg     Size: 0.5 cm x 0.5 cm with a depth of 0.1 cm    Drainage: moderate amount of serosanguinous drainage    Wound description: clean and granular, decreased in size    PROCEDURE: Per standing order, topical 4% lidocaine was applied to the wound by the CMA. The wound was mechanically debrided.     ASSESSMENT: improving stage 3 pressure ulcerations of left lower leg    PLAN:   1. Primary dressing: Woun'Dres Gel; Secondary dressing: Mepilex Border  2. Recommended wearing sock and prosthetic liner but keeping his prosthesis off as much as possible as this will be the most important component of healing the wound.     FOLLOW-UP: PRN, expect the wound to heal within 2 weeks    ADAM AMOS PA-C

## 2017-11-02 ENCOUNTER — TRANSFERRED RECORDS (OUTPATIENT)
Dept: HEALTH INFORMATION MANAGEMENT | Facility: CLINIC | Age: 72
End: 2017-11-02

## 2017-11-29 ENCOUNTER — TELEPHONE (OUTPATIENT)
Dept: FAMILY MEDICINE | Facility: CLINIC | Age: 72
End: 2017-11-29

## 2017-11-29 ENCOUNTER — APPOINTMENT (OUTPATIENT)
Dept: ULTRASOUND IMAGING | Facility: CLINIC | Age: 72
DRG: 464 | End: 2017-11-29
Attending: EMERGENCY MEDICINE
Payer: MEDICARE

## 2017-11-29 ENCOUNTER — HOSPITAL ENCOUNTER (INPATIENT)
Facility: CLINIC | Age: 72
LOS: 11 days | Discharge: NURSING, REHAB OR LONG-TERM CARE | DRG: 464 | End: 2017-12-10
Attending: EMERGENCY MEDICINE | Admitting: INTERNAL MEDICINE
Payer: MEDICARE

## 2017-11-29 DIAGNOSIS — L02.416 CELLULITIS AND ABSCESS OF LEFT LOWER EXTREMITY: ICD-10-CM

## 2017-11-29 DIAGNOSIS — L03.116 CELLULITIS AND ABSCESS OF LEFT LOWER EXTREMITY: ICD-10-CM

## 2017-11-29 DIAGNOSIS — D68.61 ANTIPHOSPHOLIPID ANTIBODY SYNDROME (H): Primary | ICD-10-CM

## 2017-11-29 PROBLEM — Z89.431 HISTORY OF AMPUTATION OF RIGHT FOOT (H): Status: RESOLVED | Noted: 2017-05-24 | Resolved: 2017-11-29

## 2017-11-29 PROBLEM — I48.0 PAROXYSMAL A-FIB (H): Status: ACTIVE | Noted: 2017-11-29

## 2017-11-29 PROBLEM — L02.419 CELLULITIS AND ABSCESS OF LEG: Status: ACTIVE | Noted: 2017-11-29

## 2017-11-29 PROBLEM — L03.119 CELLULITIS AND ABSCESS OF LEG: Status: ACTIVE | Noted: 2017-11-29

## 2017-11-29 PROBLEM — D69.6 THROMBOCYTOPENIA (H): Status: ACTIVE | Noted: 2017-11-29

## 2017-11-29 LAB
ANION GAP SERPL CALCULATED.3IONS-SCNC: 9 MMOL/L (ref 3–14)
BUN SERPL-MCNC: 23 MG/DL (ref 7–30)
CALCIUM SERPL-MCNC: 8.4 MG/DL (ref 8.5–10.1)
CHLORIDE SERPL-SCNC: 96 MMOL/L (ref 94–109)
CO2 SERPL-SCNC: 26 MMOL/L (ref 20–32)
CREAT SERPL-MCNC: 1.23 MG/DL (ref 0.66–1.25)
DIFFERENTIAL METHOD BLD: ABNORMAL
ERYTHROCYTE [DISTWIDTH] IN BLOOD BY AUTOMATED COUNT: 14.8 % (ref 10–15)
GFR SERPL CREATININE-BSD FRML MDRD: 57 ML/MIN/1.7M2
GLUCOSE SERPL-MCNC: 105 MG/DL (ref 70–99)
HCT VFR BLD AUTO: 40.5 % (ref 40–53)
HGB BLD-MCNC: 14 G/DL (ref 13.3–17.7)
INR PPP: 2.53 (ref 0.86–1.14)
INR PPP: NORMAL (ref 0.86–1.14)
INTERPRETATION ECG - MUSE: NORMAL
MCH RBC QN AUTO: 32.7 PG (ref 26.5–33)
MCHC RBC AUTO-ENTMCNC: 34.6 G/DL (ref 31.5–36.5)
MCV RBC AUTO: 95 FL (ref 78–100)
PLATELET # BLD AUTO: 125 10E9/L (ref 150–450)
POTASSIUM SERPL-SCNC: 3.9 MMOL/L (ref 3.4–5.3)
RBC # BLD AUTO: 4.28 10E12/L (ref 4.4–5.9)
SODIUM SERPL-SCNC: 131 MMOL/L (ref 133–144)
WBC # BLD AUTO: 11 10E9/L (ref 4–11)

## 2017-11-29 PROCEDURE — 85610 PROTHROMBIN TIME: CPT | Performed by: INTERNAL MEDICINE

## 2017-11-29 PROCEDURE — 85025 COMPLETE CBC W/AUTO DIFF WBC: CPT | Performed by: EMERGENCY MEDICINE

## 2017-11-29 PROCEDURE — 99223 1ST HOSP IP/OBS HIGH 75: CPT | Mod: AI | Performed by: INTERNAL MEDICINE

## 2017-11-29 PROCEDURE — 25000132 ZZH RX MED GY IP 250 OP 250 PS 637: Mod: GY | Performed by: INTERNAL MEDICINE

## 2017-11-29 PROCEDURE — 99285 EMERGENCY DEPT VISIT HI MDM: CPT | Mod: 25

## 2017-11-29 PROCEDURE — 36415 COLL VENOUS BLD VENIPUNCTURE: CPT

## 2017-11-29 PROCEDURE — A9270 NON-COVERED ITEM OR SERVICE: HCPCS | Mod: GY | Performed by: INTERNAL MEDICINE

## 2017-11-29 PROCEDURE — 12000000 ZZH R&B MED SURG/OB

## 2017-11-29 PROCEDURE — 80048 BASIC METABOLIC PNL TOTAL CA: CPT | Performed by: EMERGENCY MEDICINE

## 2017-11-29 PROCEDURE — 76882 US LMTD JT/FCL EVL NVASC XTR: CPT | Mod: LT

## 2017-11-29 PROCEDURE — 87040 BLOOD CULTURE FOR BACTERIA: CPT | Performed by: EMERGENCY MEDICINE

## 2017-11-29 PROCEDURE — 96375 TX/PRO/DX INJ NEW DRUG ADDON: CPT

## 2017-11-29 PROCEDURE — 25000128 H RX IP 250 OP 636: Performed by: EMERGENCY MEDICINE

## 2017-11-29 PROCEDURE — 93005 ELECTROCARDIOGRAM TRACING: CPT

## 2017-11-29 PROCEDURE — 96365 THER/PROPH/DIAG IV INF INIT: CPT

## 2017-11-29 RX ORDER — GABAPENTIN 600 MG/1
600 TABLET ORAL 2 TIMES DAILY
Status: DISCONTINUED | OUTPATIENT
Start: 2017-11-30 | End: 2017-12-05

## 2017-11-29 RX ORDER — NALOXONE HYDROCHLORIDE 0.4 MG/ML
.1-.4 INJECTION, SOLUTION INTRAMUSCULAR; INTRAVENOUS; SUBCUTANEOUS
Status: DISCONTINUED | OUTPATIENT
Start: 2017-11-29 | End: 2017-12-01

## 2017-11-29 RX ORDER — ONDANSETRON 2 MG/ML
4 INJECTION INTRAMUSCULAR; INTRAVENOUS EVERY 6 HOURS PRN
Status: DISCONTINUED | OUTPATIENT
Start: 2017-11-29 | End: 2017-12-01

## 2017-11-29 RX ORDER — ACETAMINOPHEN 325 MG/1
650 TABLET ORAL EVERY 4 HOURS PRN
Status: DISCONTINUED | OUTPATIENT
Start: 2017-11-29 | End: 2017-12-08

## 2017-11-29 RX ORDER — CARVEDILOL 3.12 MG/1
3.12 TABLET ORAL 2 TIMES DAILY WITH MEALS
Status: DISCONTINUED | OUTPATIENT
Start: 2017-11-29 | End: 2017-12-10 | Stop reason: HOSPADM

## 2017-11-29 RX ORDER — POLYETHYLENE GLYCOL 3350 17 G/17G
17 POWDER, FOR SOLUTION ORAL DAILY PRN
Status: DISCONTINUED | OUTPATIENT
Start: 2017-11-29 | End: 2017-12-05

## 2017-11-29 RX ORDER — ASPIRIN 81 MG/1
81 TABLET ORAL DAILY
Status: DISCONTINUED | OUTPATIENT
Start: 2017-12-01 | End: 2017-12-04

## 2017-11-29 RX ORDER — HYDROCODONE BITARTRATE AND ACETAMINOPHEN 5; 325 MG/1; MG/1
1-2 TABLET ORAL EVERY 4 HOURS PRN
Status: DISCONTINUED | OUTPATIENT
Start: 2017-11-29 | End: 2017-11-30

## 2017-11-29 RX ORDER — HYDROMORPHONE HYDROCHLORIDE 1 MG/ML
0.5 INJECTION, SOLUTION INTRAMUSCULAR; INTRAVENOUS; SUBCUTANEOUS
Status: DISCONTINUED | OUTPATIENT
Start: 2017-11-29 | End: 2017-12-01

## 2017-11-29 RX ORDER — NALOXONE HYDROCHLORIDE 0.4 MG/ML
.1-.4 INJECTION, SOLUTION INTRAMUSCULAR; INTRAVENOUS; SUBCUTANEOUS
Status: DISCONTINUED | OUTPATIENT
Start: 2017-11-29 | End: 2017-11-29

## 2017-11-29 RX ORDER — VANCOMYCIN HYDROCHLORIDE 1 G/200ML
1000 INJECTION, SOLUTION INTRAVENOUS ONCE
Status: COMPLETED | OUTPATIENT
Start: 2017-11-29 | End: 2017-11-29

## 2017-11-29 RX ORDER — DOCUSATE SODIUM 100 MG/1
100 CAPSULE, LIQUID FILLED ORAL 2 TIMES DAILY
Status: DISCONTINUED | OUTPATIENT
Start: 2017-11-29 | End: 2017-12-05

## 2017-11-29 RX ORDER — TORSEMIDE 20 MG/1
20 TABLET ORAL 2 TIMES DAILY
Status: DISCONTINUED | OUTPATIENT
Start: 2017-11-29 | End: 2017-12-01

## 2017-11-29 RX ORDER — GABAPENTIN 300 MG/1
1200 CAPSULE ORAL AT BEDTIME
Status: DISCONTINUED | OUTPATIENT
Start: 2017-11-29 | End: 2017-12-05

## 2017-11-29 RX ORDER — POTASSIUM CHLORIDE 750 MG/1
10 TABLET, EXTENDED RELEASE ORAL DAILY
Status: DISCONTINUED | OUTPATIENT
Start: 2017-11-30 | End: 2017-12-10 | Stop reason: HOSPADM

## 2017-11-29 RX ORDER — LORAZEPAM 0.5 MG/1
.5-1 TABLET ORAL 3 TIMES DAILY PRN
Status: DISCONTINUED | OUTPATIENT
Start: 2017-11-29 | End: 2017-12-10 | Stop reason: HOSPADM

## 2017-11-29 RX ORDER — ONDANSETRON 4 MG/1
4 TABLET, ORALLY DISINTEGRATING ORAL EVERY 6 HOURS PRN
Status: DISCONTINUED | OUTPATIENT
Start: 2017-11-29 | End: 2017-12-01

## 2017-11-29 RX ADMIN — TAZOBACTAM SODIUM AND PIPERACILLIN SODIUM 3.38 G: 375; 3 INJECTION, SOLUTION INTRAVENOUS at 18:36

## 2017-11-29 RX ADMIN — HYDROCODONE BITARTRATE AND ACETAMINOPHEN 1 TABLET: 5; 325 TABLET ORAL at 20:33

## 2017-11-29 RX ADMIN — TORSEMIDE 20 MG: 20 TABLET ORAL at 20:26

## 2017-11-29 RX ADMIN — DOCUSATE SODIUM 100 MG: 100 CAPSULE, LIQUID FILLED ORAL at 20:26

## 2017-11-29 RX ADMIN — CARVEDILOL 3.12 MG: 3.12 TABLET, FILM COATED ORAL at 20:26

## 2017-11-29 RX ADMIN — PHYTONADIONE 5 MG: 10 INJECTION, EMULSION INTRAMUSCULAR; INTRAVENOUS; SUBCUTANEOUS at 21:45

## 2017-11-29 RX ADMIN — VANCOMYCIN HYDROCHLORIDE 1000 MG: 1 INJECTION, SOLUTION INTRAVENOUS at 18:39

## 2017-11-29 ASSESSMENT — ENCOUNTER SYMPTOMS
FEVER: 1
COLOR CHANGE: 1
WOUND: 1

## 2017-11-29 NOTE — IP AVS SNAPSHOT
"    [place] [address] [facility]: [phone]                                              INTERAGENCY TRANSFER FORM - LAB / IMAGING / EKG / EMG RESULTS   2017                    Hospital Admission Date: 2017  GALILEA LUGO   : 1945  Sex: Male        Attending Provider: Saima Howard MD     Allergies:  Hmg-coa-r Inhibitors, Ciprofloxacin    Infection:  None   Service:  HOSPITALIST    Ht:  1.854 m (6' 1\")   Wt:  105.8 kg (233 lb 4 oz)   Admission Wt:  99.8 kg (220 lb)    BMI:  30.77 kg/m 2   BSA:  2.33 m 2            Patient PCP Information     Provider PCP Type    Main Hardy MD General         Lab Results - 3 Days      INR [[id number]] (Abnormal)  Resulted: 12/10/17 0930, Result status: Final result    Ordering provider: Davie Phillips MD  12/10/17 0000 Resulting lab: [facility]    Specimen Information    Type Source Collected On   Blood  12/10/17 0910          Components       Value Reference Range Flag Lab   INR 1.39 0.86 - 1.14 H FrStHsLb            INR [[id number]] (Abnormal)  Resulted: 17, Result status: Final result    Ordering provider: Davie Phillips MD  17 Resulting lab: [facility]    Specimen Information    Type Source Collected On   Blood  17 0847          Components       Value Reference Range Flag Lab   INR 1.16 0.86 - 1.14 H FrStHsLb            Platelet count [[id number]]  Resulted: 17, Result status: Final result    Ordering provider: Saima Howard MD  17 Resulting lab: [facility]    Specimen Information    Type Source Collected On   Blood  17 0847          Components       Value Reference Range Flag Lab   Platelet Count 184 150 - 450 10e9/L  FrStHsLb            Hemoglobin [[id number]] (Abnormal)  Resulted: 17, Result status: Final result    Ordering provider: Saima Howard MD  17 Resulting lab: " [facility]    Specimen Information    Type Source Collected On   Blood  12/09/17 0847          Components       Value Reference Range Flag Lab   Hemoglobin 9.3 13.3 - 17.7 g/dL L FrStHsLb            Anaerobic bacterial culture [[id number]]  Resulted: 12/08/17 1409, Result status: Final result    Ordering provider: Saima Howard MD  12/01/17 1756 Resulting lab: [facility]    Specimen Information    Type Source Collected On   Fluid Leg Lower, Left 12/01/17 1756          Components       Value Reference Range Flag Lab   Specimen Description Left Leg Lower Stump Wound Fluid SPECIMEN 1      Special Requests --   75   Result:         Specimen collected in eSwab transport (white cap)  This specimen was received on a swab. Results may not be optimal. For maximum sensitivity   of detection, submit tissue, fluid, or needle aspirate.     Culture Micro No anaerobes isolated   225   Result:              Fungus Culture, non-blood [[id number]]  Resulted: 12/08/17 1325, Result status: Preliminary result    Ordering provider: Leandro Arreola MD  12/04/17 0802 Resulting lab: [facility]    Specimen Information    Type Source Collected On   Wound Leg, left 12/04/17 0801          Components       Value Reference Range Flag Lab   Specimen Description Left Leg      Special Requests Specimen collected in eSwab transport (white cap)   75   Culture Micro Culture negative after 4 days   75            Wound Culture Aerobic Bacterial [[id number]]  Resulted: 12/08/17 0953, Result status: Final result    Ordering provider: Saima Howard MD  12/06/17 1159 Resulting lab: [facility]    Specimen Information    Type Source Collected On   Wound Leg Lower, Left 12/06/17 1159   Comment:  *[id number]   OR17  JV          Components       Value Reference Range Flag Lab   Specimen Description Left Leg below Knee amputation Wound      Special  Requests --   75   Result:         Specimen collected in surgery  Specimen collected in eSwab transport (white cap)     Culture Micro No growth   225   Result:              INR [[id number]] (Abnormal)  Resulted: 12/08/17 0846, Result status: Final result    Ordering provider: Davie Phillips MD  12/08/17 0000 Resulting lab: [facility]    Specimen Information    Type Source Collected On   Blood  12/08/17 0750          Components       Value Reference Range Flag Lab   INR 1.25 0.86 - 1.14 H FrStHsLb            Basic metabolic panel [[id number]] (Abnormal)  Resulted: 12/08/17 0844, Result status: Final result    Ordering provider: Davie Phillips MD  12/08/17 0000 Resulting lab: [facility]    Specimen Information    Type Source Collected On   Blood  12/08/17 0750          Components       Value Reference Range Flag Lab   Sodium 137 133 - 144 mmol/L  FrStHsLb   Potassium 4.2 3.4 - 5.3 mmol/L  FrStHsLb   Chloride 107 94 - 109 mmol/L  FrStHsLb   Carbon Dioxide 25 20 - 32 mmol/L  FrStHsLb   Anion Gap 5 3 - 14 mmol/L  FrStHsLb   Glucose 84 70 - 99 mg/dL  FrStHsLb   Urea Nitrogen 10 7 - 30 mg/dL  FrStHsLb   Creatinine 1.19 0.66 - 1.25 mg/dL  FrStHsLb   GFR Estimate 60 >60 mL/min/1.7m2 L FrStHsLb   Comment:  Non  GFR Calc   GFR Estimate If Black 72 >60 mL/min/1.7m2  FrStHsLb   Comment:  African American GFR Calc   Calcium 8.6 8.5 - 10.1 mg/dL  FrStHsLb            CBC with platelets [[id number]] (Abnormal)  Resulted: 12/08/17 0838, Result status: Final result    Ordering provider: Davie Phillips MD  12/08/17 0000 Resulting lab: [facility]    Specimen Information    Type Source Collected On   Blood  12/08/17 0750          Components       Value Reference Range Flag Lab   WBC 7.4 4.0 - 11.0 10e9/L  FrStHsLb   RBC Count 2.93 4.4 - 5.9 10e12/L L FrStHsLb   Hemoglobin 9.2 13.3 - 17.7 g/dL L FrStHsLb   Hematocrit 28.1 40.0 - 53.0 % L FrStHsLb    MCV 96 78 - 100 fl  FrStHsLb   MCH 31.4 26.5 - 33.0 pg  FrStHsLb   MCHC 32.7 31.5 - 36.5 g/dL  FrStHsLb   RDW 15.8 10.0 - 15.0 % H FrStHsLb   Platelet Count 184 150 - 450 10e9/L  FrStHsLb            Anaerobic bacterial culture [[id number]]  Resulted: 12/07/17 1132, Result status: Preliminary result    Ordering provider: Saima Howard MD  12/06/17 1159 Resulting lab: [facility]    Specimen Information    Type Source Collected On   Wound Leg Lower, Left 12/06/17 1159   Comment:  *[id number]   OR17  JV          Components       Value Reference Range Flag Lab   Specimen Description Left Leg below Knee amputation Wound      Special Requests --   75   Result:         Specimen collected in surgery  Specimen collected in eSwab transport (white cap)     Culture Micro Culture negative monitoring continues   225   Result:              Platelet count [[id number]]  Resulted: 12/07/17 0829, Result status: Final result    Ordering provider: Saima Howard MD  12/07/17 0000 Resulting lab: [facility]    Specimen Information    Type Source Collected On   Blood  12/07/17 0813          Components       Value Reference Range Flag Lab   Platelet Count 164 150 - 450 10e9/L  FrStHsLb            Hemoglobin [[id number]] (Abnormal)  Resulted: 12/07/17 0829, Result status: Final result    Ordering provider: Saima Howard MD  12/07/17 0000 Resulting lab: [facility]    Specimen Information    Type Source Collected On   Blood  12/07/17 0813          Components       Value Reference Range Flag Lab   Hemoglobin 8.6 13.3 - 17.7 g/dL L FrStHsLb            Testing Performed By     Lab - Abbreviation Name Director Address Valid Date Range    14 - FrStHsLb [facility] Unknown [address] [place] [address] 05/08/15 1057 - Present    75 - Unknown [facility] Unknown [address] [facility] [address] 01/15/15 1019 - Present    225 -  Unknown [facility] Unknown [address] [facility] [address] 12/19/14 0954 - Present            Unresulted Labs (24h ago through future)    Start       Ordered    12/11/17 0600  Platelet count  (heparin)  EVERY THREE DAYS,   Routine     Comments:  Repeat every 3 days while on VTE prophylaxis. If no result is listed, this lab has not been done the past 365 days. LATEST LAB RESULT: Platelet Count (10e9/L)       Date                     Value                 12/08/2017               184              ----------      12/08/17 2104    12/09/17 0600  Platelet count  (heparin)  EVERY THREE DAYS,   Routine     Comments:  Repeat every 3 days while on VTE prophylaxis. If no result is listed, this lab has not been done the past 365 days. LATEST LAB RESULT: Platelet Count (10e9/L)       Date                     Value                 12/05/2017               142 (L)          ----------      12/06/17 1359    12/08/17 0600  INR  (warfarin (COUMADIN) Provider Dosed Managed- INITIAL DOSE- UU,UR,SH,RH,PH,WY,HI)  DAILY,   Routine      12/07/17 0924    Unscheduled  Hemoglobin  CONDITIONAL X 2,   Routine     Comments:  IF morning Hemoglobin result is LESS than 8.0 on POD 1 and/or POD 2, release order and recheck Hemoglobin in the evening at 1700.  Notify Provider if second Hemoglobin result is LESS than 7.5.    12/01/17 1952    Unscheduled  Hemoglobin  CONDITIONAL X 2,   Routine     Comments:  IF morning Hemoglobin result is LESS than 8.0 on POD 1 and/or POD 2, release order and recheck Hemoglobin in the evening at 1700.  Notify Provider if second Hemoglobin result is LESS than 7.5.    12/06/17 1359    Unscheduled  Hemoglobin  CONDITIONAL X 2,   Routine     Comments:  IF morning Hemoglobin result is LESS than 8.0 on POD 1 and/or POD 2, release order and recheck Hemoglobin in the evening at 1700.  Notify Provider if second Hemoglobin result is LESS than 7.5.    12/08/17 2104      Encounter-Level  Documents:     There are no encounter-level documents.      Order-Level Documents:     There are no order-level documents.

## 2017-11-29 NOTE — IP AVS SNAPSHOT
[place] [address] [facility]    [address] [place] [address]    Phone:  [phone]                                       After Visit Summary   11/29/2017    Antonio Ramirez    MRN: [id number]           After Visit Summary Signature Page     I have received my discharge instructions, and my questions have been answered. I have discussed any challenges I see with this plan with the nurse or doctor.    ..........................................................................................................................................  Patient/Patient Representative Signature      ..........................................................................................................................................  Patient Representative Print Name and Relationship to Patient    ..................................................               ................................................  Date                                            Time    ..........................................................................................................................................  Reviewed by Signature/Title    ...................................................              ..............................................  Date                                                            Time

## 2017-11-29 NOTE — TELEPHONE ENCOUNTER
I would strongly advise ER given fever and systemic symptom and concern about infection at amputation site

## 2017-11-29 NOTE — IP AVS SNAPSHOT
` `     [facility] SURGICAL SPECIALITIES: [phone]                 INTERAGENCY TRANSFER FORM - NOTES (H&P, Discharge Summary, Consults, Procedures, Therapies)   2017                    Hospital Admission Date: 2017  GALILEA RAMIREZ   : 1945  Sex: Male        Patient PCP Information     Provider PCP Type    Main Hardy MD General         History & Physicals      H&P by Davie Phillips MD at 2017  7:57 PM     Author:  Davie Phillips MD Service:  Hospitalist Author Type:  Physician    Filed:  2017  9:08 PM Date of Service:  2017  7:57 PM Creation Time:  2017  7:57 PM    Status:  Signed :  Davie Phillips MD (Physician)         [facility]    History and Physical  Hospitalist       Date of Admission:  2017[JM1.1]    Assessment & Plan[JM1.2]   Galilea Ramirez is a 72 year old male who presents with left BKA pain and swelling and has:    Summary:[JM1.1]    Principal Problem:    Abscess of Left BKA  Active Problems:    Alcoholic cirrhosis of liver (H)    Chronic kidney disease, stage III (moderate)    CAD, MI in  -- S/P CABG x 3 in     Prostate cancer -- S/P Radiative Seed implants in  (no problems since)    Antiphospholipid Jina Syn, Hypercoag (DVT, PE) -- has IVC filt and Warfarin    Thoracic aortic aneurysm without rupture (H)    Chronic congestive heart failure, unspecified congestive heart failure type (H)    Paroxysmal a-fib (H)    Cellulitis and abscess of leg    Thrombocytopenia -- suspect related to alcohol use[JM1.2]     Plan -- IV antibiotics, Ortho consult to consider I and D (probably in OR tomorrow).  Will give Vit K 5 mg tonight, then check INR in AM and give next Aspirin dose on Friday AM.  Discussed advanced directives, desires Full Code.  Discussed alcohol use, he has been told to stop it in the past.  I suspect his idiopathic neuropathy is probably related to alcohol use, and  his low platelets probably also related to that.  Will check AST in AM, but suggested avoiding alcohol all together (he uses it to help relax).  Ativan PRN ordered while here.[JM1.1]  Will check sodium in AM.  Will check EKG, but medically stable for I and D -- pending INR (bleeding might be limited if minor surgery, and can use tourniquet).[JM1.3]      DVT Prophylaxis:[JM1.1] Warfarin and Pneumatic Compression Devices[JM1.3]  Code Status:[JM1.1] Full Code[JM1.3]    Disposition: Expected discharge in[JM1.1] 3[JM1.3] days once[JM1.1] abscess drained and definitive antibiotic therapy decided[JM1.3].[JM1.1]    Davie Osborn[JM1.2], MD[JM1.1]    Primary Care Physician   Main Hardy    Chief Complaint[JM1.2]   Left BKA pain and swelling    History is obtained from the patient[JM1.3]    History of Present Illness[JM1.2]   73 yo white male with CAD, S/P CABG in 2007 and no cardiac problems since then, CRF stage 3, 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  2 weeks ago in [place] saw his prosthetic yulisa who thought he might benefit from a tighter prosthesis rubber sleeve, and then 3-4 days ago he noted pain and swelling, progressively worse, temp up to 102, chills, and presented to ER today and ultrasound shows a fluid collection around the left BKA stump max 6.6 cm in size.  He is on warfarin with INR 2.53.[JM1.3]      Past Medical History[JM1.2]    I have reviewed this patient's medical history and updated it with pertinent information if needed.[JM1.3]   Past Medical History:   Diagnosis Date     Alcoholism (H)      Amputated left leg (H)      Anemia      Anticardiolipin antibody syndrome (H)      Antiplatelet or antithrombotic long-term use       Aortic stenosis      Arthritis      Balance problem      Cardiomyopathy (H)      Coagulation disorder (H)     cardiolipinantibody syndrome     Coronary artery disease      Gastro-oesophageal reflux disease      Heart attack 2007     Hiatal hernia      Hypercholesterolemia      Hypertension      Left foot drop      Liver disease     alcoholic liver disease, alcoholic cirrohsosis, portal hypertension     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)     ?When diagnosed, patient not aware of this     Moderate mitral regurgitation      Monoclonal gammopathy      Neuropathy      Noninfectious ileitis     diverticulitis of colon     PE (pulmonary embolism) 2006     Prostate cancer (H) 2000    Treated with radiation (seed implants in 2000) -- no problems since     Renal disease     kidney stones     Syncope      Thoracic aortic aneurysm, without rupture      Thrombocytopenia (H)      Thrombosis of leg      Urethral stricture        Past Surgical History[JM1.2]   I have reviewed this patient's surgical history and updated it with pertinent information if needed.[JM1.3]  Past Surgical History:   Procedure Laterality Date     AMPUTATE LEG BELOW KNEE Left 04/2014    related to gangrene, started as foot ulcer related to neuropathy     APPENDECTOMY       ARTHROPLASTY KNEE Right 9/19/2014    Procedure: ARTHROPLASTY KNEE;  Surgeon: Saima Howard MD;  Location:  OR     CARDIAC SURGERY      CABG     CHOLECYSTECTOMY       COLONOSCOPY       CYSTOSCOPY, DILATE URETHRA, COMBINED N/A 10/12/2016    Procedure: COMBINED CYSTOSCOPY, DILATE URETHRA;  Surgeon: Dimitry Bello MD;  Location:  OR     ENDOSCOPIC STRIPPING VEIN(S)       esophagogastroduodenoscopy       EYE SURGERY      cataracts     GENITOURINARY SURGERY      Prostate Seen Implants     HERNIA REPAIR      Umbilical     IR IVC FILTER PLACEMENT       ORTHOPEDIC SURGERY      (R) knee scope     ORTHOPEDIC SURGERY      total hip      ORTHOPEDIC SURGERY      elbow surgery  "      Prior to Admission Medications   Prior to Admission Medications   Prescriptions Last Dose Informant Patient Reported? Taking?   ASPIRIN EC PO 11/29/2017 at Unknown time  Yes Yes   Sig: Take 81 mg by mouth daily   CARVEDILOL PO 11/29/2017 at Unknown time  Yes Yes   Sig: Take 3.125 mg by mouth 2 times daily (with meals)    Gabapentin (NEURONTIN PO) 11/29/2017 at Unknown time  Yes Yes   Sig: Take 600 mg by mouth 3 times daily 600 mg in morning, 600 mg midday & 1200 mg at HS   WARFARIN SODIUM PO 11/28/2017 at Unknown time  Yes Yes   Sig: Take 5 mg by mouth daily 5mg Monday to Saturday and no dose on sundays.   evolocumab (REPATHA) 140 MG/ML prefilled syringe 11/17/2017  Yes Yes   Sig: Inject 1 pen subcutaneously every 2 weeks. For sample use only   order for DME   No No   Sig: [facility] Phone [phone] Fax [phone]    Primary Dressing Woun'Dres Gel   Qty DO NOT DISPENSE  Secondary Dressing Mepilex 4x4 Qty 60 (for two wounds)  Length of Need: 1 month  Frequency of dressing change: daily   potassium chloride SA (K-DUR/KLOR-CON M) 10 MEQ CR tablet 11/29/2017 at Unknown time  Yes Yes   Sig: Take 10 mEq by mouth daily   torsemide (DEMADEX) 20 MG tablet   Yes Yes   Sig: Take 20 mg by mouth 2 times daily       Facility-Administered Medications: None     Allergies   Allergies   Allergen Reactions     Hmg-Coa-R Inhibitors Other (See Comments)     Rhabdomyolysis occurred within a couple days     Ciprofloxacin Itching     Severe itching \"like ants were crawling\"       Social History[JM1.2]   I have reviewed this patient's social history and updated it with pertinent information if needed. Antonio Ramirez[JM1.3]  reports that he has never smoked. He does not have any smokeless tobacco history on file. He reports that he drinks alcohol. He reports that he does not use illicit drugs.    Family History[JM1.2]   I have reviewed this patient's family history and updated it with pertinent information if " needed.[JM1.3]   Family History   Problem Relation Age of Onset     Lung Cancer Mother      Heart Failure Father       age 87       Review of Systems[JM1.2]   The 10 point Review of Systems is negative other than noted in the HPI or here.  He does have to urinate 0-2 times per night.[JM1.3]      Physical Exam   Temp: 98.8  F (37.1  C) Temp src: Oral BP: 134/61 Pulse: 67   Resp: 16 SpO2: 95 % O2 Device: None (Room air)[JM1.2]    Vital Signs with Ranges[JM1.1]  Temp:  [98.3  F (36.8  C)-98.8  F (37.1  C)] 98.8  F (37.1  C)  Pulse:  [67-69] 67  Resp:  [16-18] 16  BP: (120-134)/(61-76) 134/61  SpO2:  [95 %-97 %] 95 %  220 lbs 0 oz[JM1.2]    Constitutional: Awake, alert, cooperative,[JM1.1] moderate left leg discomfort[JM1.3].  Eyes: Conjunctiva and pupils examined and normal.  HEENT: Moist mucous membranes, normal dentition.  Respiratory: Clear to auscultation bilaterally, no crackles or wheezing.  Cardiovascular: Regular rate and rhythm, normal S1 and S2, and[JM1.1] 2/6 systolic[JM1.3] murmur noted.[JM1.1]  No right ankle swelling (but uses a nocturnal leg compression device to help with venous stasis).[JM1.3]    GI: Soft, non-distended, non-tender, normal bowel sounds.  Lymph/Hematologic: No anterior cervical or supraclavicular adenopathy.  Skin:[JM1.1] moderate swelling, redness and tenderness left BKA, with about 1-2 cm abrasion over BKA stump (he noted some fluid leaking from there the last 1-2 days, none now).  He did have chaffing over posterior left knee -- which has improved since he has the tighter BKA rubber sleeve.[JM1.3]   Musculoskeletal: No joint swelling, erythema or tenderness.  Neurologic:[JM1.1] decreased light touch to mid-shin on right, and left BKA with decreased light touch at distal end but full sensation at level of just below knee[JM1.3]  Psychiatric: Alert, oriented to person, place and time, no obvious anxiety or depression.[JM1.1]    Data[JM1.2]   Data reviewed today:[JM1.1]  No EKG done  yet.[JM1.3]      Recent Labs  Lab 11/29/17  1829 11/29/17  1649   WBC  --  11.0   HGB  --  14.0   MCV  --  95   PLT  --  125*   INR 2.53* Canceled, Test credited   NA  --  131*   POTASSIUM  --  3.9   CHLORIDE  --  96   CO2  --  26   BUN  --  23   CR  --  1.23   ANIONGAP  --  9   BENNIE  --  8.4*   GLC  --  105*[JM1.2]       Imaging:[JM1.1]  Recent Results (from the past 24 hour(s))   US Extremity Non Vascular Left    Narrative    US EXTREMITY NON VASCULAR LEFT  11/29/2017 5:26 PM     HISTORY: Evaluate abscess and stump site.     COMPARISON: None.    FINDINGS: Edema throughout the distal left lower extremity stump in  the remaining tissues about the amputated left foot. There is a 6.6 x  3.1 x 1.6 cm complex fluid collection anteriorly. This could be a  seroma or abscess. There is considerable increased vascularity within  the edematous tissues.      Impression    IMPRESSION:   1. Edematous hyperemic left lower extremity stump tissues.  2. Complex fluid collection as described. Abscess cannot be excluded.    MALKA DE MD[JM1.2]         Revision History        User Key Date/Time User Provider Type Action    > JM1.2 11/29/2017  9:08 PM Davie Phillips MD Physician Sign     JM1.3 11/29/2017  8:40 PM Davie Phillips MD Physician      JM1.1 11/29/2017  7:57 PM Davie Phillips MD Physician                      Discharge Summaries      Discharge Summaries by Parth Farias MD at 12/9/2017 11:00 AM     Author:  Parth Farias MD Service:  Hospitalist Author Type:  Physician    Filed:  12/9/2017 11:38 AM Date of Service:  12/9/2017 11:00 AM Creation Time:  12/9/2017 11:00 AM    Status:  Signed :  Parth Farias MD (Physician)         Discharge Summary    Antonio Ramirez MRN# [id number]   YOB: 1945 Age: 72 year old     Date of Admission:  11/29/2017  Date of Discharge:  12/9/2017  Admitting Physician:  Saima Howard MD  Discharge Physician:  Parth Farias MD Pager  "[phone] Office [phone]  Discharging Service:  [facility] Hospitalist Service     Primary Provider: Main Hardy          Discharge Diagnosis:   See problem-oriented hospital course for diagnoses addressed during this hospital stay.             Discharge Disposition:   Discharged to nursing home          Condition on Discharge:   Discharge condition: Stable   Discharge vitals: Blood pressure 118/68, pulse 61, temperature 97.9  F (36.6  C), temperature source Oral, resp. rate 16, height 1.854 m (6' 1\"), weight 105.8 kg (233 lb 4 oz), SpO2 96 %.   Code status on discharge: Full Code          Discharge Medications:[AH1.1]     General info for SNF   Length of Stay Estimate: Short Term Care: Estimated # of Days 31-90  Condition at Discharge: Improving  Level of care:skilled   Rehabilitation Potential: Fair  Admission H&P remains valid and up-to-date: Yes  Recent Chemotherapy: N/A  Use Nursing Home Standing Orders: Yes     Reason for your hospital stay   Knee abscess     Activity - Up with nursing assistance     Activity - Up with assistive device     Follow Up and recommended labs and tests   Follow up with shelter physician.  The following labs/tests are recommended: INR  And BMP on monday.  F/u with ortho clinic next Monday for dressing changes.     Physical Therapy Adult Consult   Evaluate and treat as clinically indicated.    Reason: post surgery     Occupational Therapy Adult Consult   Evaluate and treat as clinically indicated.    Reason: post surgery     Fall precautions     Advance Diet as Tolerated   Follow this diet upon discharge: Orders Placed This Encounter     Advance Diet as Tolerated: Regular Diet Adult[AH1.2]          Consultations:   Ortho  ID        Brief hx and History and Hospital Course at Presentation:   Antonio Ramirez is a 72 year old male who was admitted on 11/29/2017 for left BKA abscess and found to have MSSA and has wound vac on BKA:      Abscess of Left BKA -- S/P I&D 12/1/17, " MSSA. S/p revision bka stump and closure of wound on 12/8. Has been on ancef. Switched to augmentin at d/c. Pain is controlled. Afebrile. Followed by ID and ortho.  - 10 days of augmentin.  - Follow up with ortho next week for dressing change.  - Pain meds (with bowel regimen) ordered.        Pt is being discharged to TCU in stable conditions.          DISCHARGE EXAM:   Temp:  [97  F (36.1  C)-98.3  F (36.8  C)] 97.9  F (36.6  C)  Pulse:  [55-61] 61  Heart Rate:  [53-68] 68  Resp:  [9-21] 16  BP: ()/(53-89) 118/68  SpO2:  [95 %-100 %] 96 %  Wt Readings from Last 5 Encounters:   12/08/17 105.8 kg (233 lb 4 oz)   09/25/17 102.1 kg (225 lb)   08/31/17 106.6 kg (235 lb)   06/30/17 115.2 kg (254 lb)   06/27/17 111.6 kg (246 lb)            Pertinent Tests and Procedures:   S/p KKA stump revision             Pending Results:   none          Discharge Instructions and Follow-Up:   Discharge diet:   Active Diet Order      Advance Diet as Tolerated: Regular Diet Adult      Advance Diet as Tolerated   Discharge activity: Activity as tolerated   Discharge follow-up: Follow up with NH provider  Follow up with ortho next week   Outpatient therapy: None    Home Care agency: None    Supplies and equipment: None   Lines and drains: None    Wound care: None   Other instructions: None      More than 30 minutes were required for coordination of care for this discharge.          Procedures / Labs / Imaging:     Results for orders placed or performed during the hospital encounter of 11/29/17   US Extremity Non Vascular Left    Narrative    US EXTREMITY NON VASCULAR LEFT  11/29/2017 5:26 PM     HISTORY: Evaluate abscess and stump site.     COMPARISON: None.    FINDINGS: Edema throughout the distal left lower extremity stump in  the remaining tissues about the amputated left foot. There is a 6.6 x  3.1 x 1.6 cm complex fluid collection anteriorly. This could be a  seroma or abscess. There is considerable increased vascularity  within  the edematous tissues.      Impression    IMPRESSION:   1. Edematous hyperemic left lower extremity stump tissues.  2. Complex fluid collection as described. Abscess cannot be excluded.    MALKA DE MD   CBC with platelets differential   Result Value Ref Range    WBC 11.0 4.0 - 11.0 10e9/L    RBC Count 4.28 (L) 4.4 - 5.9 10e12/L    Hemoglobin 14.0 13.3 - 17.7 g/dL    Hematocrit 40.5 40.0 - 53.0 %    MCV 95 78 - 100 fl    MCH 32.7 26.5 - 33.0 pg    MCHC 34.6 31.5 - 36.5 g/dL    RDW 14.8 10.0 - 15.0 %    Platelet Count 125 (L) 150 - 450 10e9/L    Diff Method Automated Method    INR   Result Value Ref Range    INR Canceled, Test credited 0.86 - 1.14   Basic metabolic panel   Result Value Ref Range    Sodium 131 (L) 133 - 144 mmol/L    Potassium 3.9 3.4 - 5.3 mmol/L    Chloride 96 94 - 109 mmol/L    Carbon Dioxide 26 20 - 32 mmol/L    Anion Gap 9 3 - 14 mmol/L    Glucose 105 (H) 70 - 99 mg/dL    Urea Nitrogen 23 7 - 30 mg/dL    Creatinine 1.23 0.66 - 1.25 mg/dL    GFR Estimate 57 (L) >60 mL/min/1.7m2    GFR Estimate If Black 69 >60 mL/min/1.7m2    Calcium 8.4 (L) 8.5 - 10.1 mg/dL   INR   Result Value Ref Range    INR 2.53 (H) 0.86 - 1.14   INR   Result Value Ref Range    INR 2.56 (H) 0.86 - 1.14   CBC with platelets   Result Value Ref Range    WBC 7.8 4.0 - 11.0 10e9/L    RBC Count 3.80 (L) 4.4 - 5.9 10e12/L    Hemoglobin 12.5 (L) 13.3 - 17.7 g/dL    Hematocrit 35.6 (L) 40.0 - 53.0 %    MCV 94 78 - 100 fl    MCH 32.9 26.5 - 33.0 pg    MCHC 35.1 31.5 - 36.5 g/dL    RDW 14.5 10.0 - 15.0 %    Platelet Count 106 (L) 150 - 450 10e9/L   AST   Result Value Ref Range    AST 18 0 - 45 U/L   Basic metabolic panel   Result Value Ref Range    Sodium 134 133 - 144 mmol/L    Potassium 3.1 (L) 3.4 - 5.3 mmol/L    Chloride 99 94 - 109 mmol/L    Carbon Dioxide 26 20 - 32 mmol/L    Anion Gap 9 3 - 14 mmol/L    Glucose 95 70 - 99 mg/dL    Urea Nitrogen 25 7 - 30 mg/dL    Creatinine 1.27 (H) 0.66 - 1.25 mg/dL    GFR Estimate  55 (L) >60 mL/min/1.7m2    GFR Estimate If Black 67 >60 mL/min/1.7m2    Calcium 8.3 (L) 8.5 - 10.1 mg/dL   Prealbumin   Result Value Ref Range    Prealbumin 4 (L) 15 - 45 mg/dL   Factor 10 chromogenic   Result Value Ref Range    Chromogenic Factor 10 31 (L) 70 - 130 %   Potassium   Result Value Ref Range    Potassium 3.7 3.4 - 5.3 mmol/L   Basic metabolic panel   Result Value Ref Range    Sodium 133 133 - 144 mmol/L    Potassium 3.7 3.4 - 5.3 mmol/L    Chloride 99 94 - 109 mmol/L    Carbon Dioxide 26 20 - 32 mmol/L    Anion Gap 8 3 - 14 mmol/L    Glucose 93 70 - 99 mg/dL    Urea Nitrogen 26 7 - 30 mg/dL    Creatinine 1.43 (H) 0.66 - 1.25 mg/dL    GFR Estimate 48 (L) >60 mL/min/1.7m2    GFR Estimate If Black 58 (L) >60 mL/min/1.7m2    Calcium 8.3 (L) 8.5 - 10.1 mg/dL   CBC with platelets   Result Value Ref Range    WBC 8.3 4.0 - 11.0 10e9/L    RBC Count 3.75 (L) 4.4 - 5.9 10e12/L    Hemoglobin 12.3 (L) 13.3 - 17.7 g/dL    Hematocrit 35.4 (L) 40.0 - 53.0 %    MCV 94 78 - 100 fl    MCH 32.8 26.5 - 33.0 pg    MCHC 34.7 31.5 - 36.5 g/dL    RDW 14.5 10.0 - 15.0 %    Platelet Count 116 (L) 150 - 450 10e9/L   INR   Result Value Ref Range    INR 1.91 (H) 0.86 - 1.14   Factor 10 chromogenic   Result Value Ref Range    Chromogenic Factor 10 40 (L) 70 - 130 %   INR   Result Value Ref Range    INR 1.65 (H) 0.86 - 1.14   Creatinine   Result Value Ref Range    Creatinine 1.48 (H) 0.66 - 1.25 mg/dL    GFR Estimate 46 (L) >60 mL/min/1.7m2    GFR Estimate If Black 56 (L) >60 mL/min/1.7m2   Hemoglobin   Result Value Ref Range    Hemoglobin 11.1 (L) 13.3 - 17.7 g/dL   Factor 10 chromogenic   Result Value Ref Range    Chromogenic Factor 10 45 (L) 70 - 130 %   Heparin 10a Level   Result Value Ref Range    Heparin 10A Level 0.38 IU/mL   Glucose   Result Value Ref Range    Glucose 132 (H) 70 - 99 mg/dL   Heparin 10a Level   Result Value Ref Range    Heparin 10A Level 0.46 IU/mL   Basic metabolic panel   Result Value Ref Range    Sodium 133  133 - 144 mmol/L    Potassium 4.2 3.4 - 5.3 mmol/L    Chloride 98 94 - 109 mmol/L    Carbon Dioxide 29 20 - 32 mmol/L    Anion Gap 6 3 - 14 mmol/L    Glucose 90 70 - 99 mg/dL    Urea Nitrogen 23 7 - 30 mg/dL    Creatinine 1.58 (H) 0.66 - 1.25 mg/dL    GFR Estimate 43 (L) >60 mL/min/1.7m2    GFR Estimate If Black 52 (L) >60 mL/min/1.7m2    Calcium 8.5 8.5 - 10.1 mg/dL   CBC with platelets   Result Value Ref Range    WBC 6.5 4.0 - 11.0 10e9/L    RBC Count 3.32 (L) 4.4 - 5.9 10e12/L    Hemoglobin 10.7 (L) 13.3 - 17.7 g/dL    Hematocrit 31.9 (L) 40.0 - 53.0 %    MCV 96 78 - 100 fl    MCH 32.2 26.5 - 33.0 pg    MCHC 33.5 31.5 - 36.5 g/dL    RDW 14.4 10.0 - 15.0 %    Platelet Count 121 (L) 150 - 450 10e9/L   Heparin 10a Level   Result Value Ref Range    Heparin 10A Level 0.35 IU/mL   Hemoglobin   Result Value Ref Range    Hemoglobin 8.9 (L) 13.3 - 17.7 g/dL   Hemoglobin   Result Value Ref Range    Hemoglobin 7.0 (L) 13.3 - 17.7 g/dL   Basic metabolic panel   Result Value Ref Range    Sodium 138 133 - 144 mmol/L    Potassium 4.2 3.4 - 5.3 mmol/L    Chloride 105 94 - 109 mmol/L    Carbon Dioxide 26 20 - 32 mmol/L    Anion Gap 7 3 - 14 mmol/L    Glucose 104 (H) 70 - 99 mg/dL    Urea Nitrogen 18 7 - 30 mg/dL    Creatinine 1.43 (H) 0.66 - 1.25 mg/dL    GFR Estimate 48 (L) >60 mL/min/1.7m2    GFR Estimate If Black 58 (L) >60 mL/min/1.7m2    Calcium 7.5 (L) 8.5 - 10.1 mg/dL   CBC with platelets   Result Value Ref Range    WBC 5.6 4.0 - 11.0 10e9/L    RBC Count 2.47 (L) 4.4 - 5.9 10e12/L    Hemoglobin 8.0 (L) 13.3 - 17.7 g/dL    Hematocrit 23.4 (L) 40.0 - 53.0 %    MCV 95 78 - 100 fl    MCH 32.4 26.5 - 33.0 pg    MCHC 34.2 31.5 - 36.5 g/dL    RDW 14.2 10.0 - 15.0 %    Platelet Count 127 (L) 150 - 450 10e9/L   Hemoglobin   Result Value Ref Range    Hemoglobin 9.5 (L) 13.3 - 17.7 g/dL   Hemoglobin   Result Value Ref Range    Hemoglobin 10.2 (L) 13.3 - 17.7 g/dL   CBC with platelets   Result Value Ref Range    WBC 6.4 4.0 - 11.0  10e9/L    RBC Count 2.43 (L) 4.4 - 5.9 10e12/L    Hemoglobin 8.0 (L) 13.3 - 17.7 g/dL    Hematocrit 23.0 (L) 40.0 - 53.0 %    MCV 95 78 - 100 fl    MCH 32.9 26.5 - 33.0 pg    MCHC 34.8 31.5 - 36.5 g/dL    RDW 14.5 10.0 - 15.0 %    Platelet Count 156 150 - 450 10e9/L   Fibrinogen activity   Result Value Ref Range    Fibrinogen 278 200 - 420 mg/dL   INR   Result Value Ref Range    INR 1.65 (H) 0.86 - 1.14   Partial thromboplastin time   Result Value Ref Range    PTT 37 22 - 37 sec   Hemoglobin   Result Value Ref Range    Hemoglobin 10.2 (L) 13.3 - 17.7 g/dL   Heparin 10a Level   Result Value Ref Range    Heparin 10A Level 0.14 IU/mL   CBC with platelets   Result Value Ref Range    WBC 7.7 4.0 - 11.0 10e9/L    RBC Count 3.07 (L) 4.4 - 5.9 10e12/L    Hemoglobin 9.5 (L) 13.3 - 17.7 g/dL    Hematocrit 27.9 (L) 40.0 - 53.0 %    MCV 91 78 - 100 fl    MCH 30.9 26.5 - 33.0 pg    MCHC 34.1 31.5 - 36.5 g/dL    RDW 15.4 (H) 10.0 - 15.0 %    Platelet Count 142 (L) 150 - 450 10e9/L   Basic metabolic panel   Result Value Ref Range    Sodium 137 133 - 144 mmol/L    Potassium 4.2 3.4 - 5.3 mmol/L    Chloride 106 94 - 109 mmol/L    Carbon Dioxide 23 20 - 32 mmol/L    Anion Gap 8 3 - 14 mmol/L    Glucose 121 (H) 70 - 99 mg/dL    Urea Nitrogen 15 7 - 30 mg/dL    Creatinine 1.08 0.66 - 1.25 mg/dL    GFR Estimate 67 >60 mL/min/1.7m2    GFR Estimate If Black 81 >60 mL/min/1.7m2    Calcium 8.2 (L) 8.5 - 10.1 mg/dL   Heparin 10a Level   Result Value Ref Range    Heparin 10A Level 0.35 IU/mL   Heparin Xa (10a) Level   Result Value Ref Range    Heparin 10A Level <0.10 IU/mL     *Note: Due to a large number of results and/or encounters for the requested time period, some results have not been displayed. A complete set of results can be found in Results Review.[AH1.1]          Revision History        User Key Date/Time User Provider Type Action    > AH1.2 12/9/2017 11:38 AM Parth Farias MD Physician Sign     AH1.1 12/9/2017 11:00 AM Dinora  Parth COOK MD Physician                      Consult Notes      Consults signed by Saima Howard MD at 12/4/2017 11:33 AM      Author:  Saima Howard MD Service:  Orthopedics Author Type:  Physician    Filed:  12/4/2017 11:33 AM Date of Service:  11/30/2017  7:16 PM Creation Time:  11/30/2017  9:03 PM    Status:  Signed :  Saima Howard MD (Physician)         ORTHOPEDIC CONSULTATION      REASON FOR CONSULTATION:  I was asked to provide orthopedic consultation for Antonio Ramirez, a 72-year-old male who was admitted with a subcutaneous abscess of the left BKA stump.  He reports that over the weekend he began developing redness and swelling about the distal aspect of his BKA amputation site.  Recently he had his socket on his prosthesis changed and he reports that he developed a pressure spot, and subsequent to that he has developed a swollen, red, left BKA stump site.  He has multiple medical problems.  You can refer to his admission history and physical for the specifics of his past medical history and medication.  He is currently on Coumadin and Dilaudid along with gabapentin and carvedilol.      REVIEW OF SYSTEMS:  He reports that he had a fever up to 101 yesterday.  He denies recent chest pain or shortness of breath.  He denies any nausea, vomiting or diarrhea.      PHYSICAL EXAMINATION:  He is a 72-year-old male who is alert and oriented x3.  He is pleasant, in no acute distress.  Painless range of motion of the upper extremities.  Examination of his left below-knee amputation site demonstrates fluctuance at the anterior aspect of the stump and erythema that extends to just below the knee, both anteriorly and posteriorly.  He is able to flex and extend the knee without difficulty.  No x-rays available at the time of this dictation.      ASSESSMENT:  Subcutaneous abscess, left BKA stump.  The patient is currently on IV antibiotics.        PLAN:  The plan is to proceed with I&D of this on Friday  morning.  His INR is 2.56.  The plan will be to reverse that this evening.  He will need to be cleared for surgery by Medicine.         SAIMA SMITH MD             D: 2017 19:16   T: 2017 21:03   MT: [id number]      Name:     GALILEA LUGO   MRN:      [id number]        Account:       [id number]   :      1945           Consult Date:  2017      Document: [id number]    [LV1.1]   Revision History        User Key Date/Time User Provider Type Action    > LV1.1 2017 11:33 AM Saima Smith MD Physician Sign            Consults by Lejeune, Stacey, MD at 2017  6:32 AM     Author:  Lejeune, Stacey, MD Service:  Vascular Surgery Author Type:  Fellow    Filed:  2017 11:03 AM Date of Service:  2017  6:32 AM Creation Time:  2017  6:31 AM    Status:  Cosign Needed :  Lejeune, Stacey, MD (Fellow)    Cosign Required:  Yes         Consult Orders:    1. Vascular Surgery IP Consult: Patient to be seen: STAT - within 1 hour; Call back #: team aware no need to contact; Bleeding left BKA; Consultant may enter orders: Yes [[id number]] ordered by Lejeune, Stacey, MD at 17 0629                Vascular Surgery Consult    Reason for consult: Bleeding left below knee amputation    HPI:   73 yo male with left BKA ~ 4 years ago per wife in [place] for worsening foot infection.  He then had a revision approximately 18 months ago also in [place] and now is POD#3 s/p left BKA I & D by ortho with wound vac placement.  Patient is on coumadin at home for antiphospholipid syndrome and was noted to have complications with his wound vac yesterday when it was turned off.  I was called this morning for increased bleeding and the wound was noted to have pulsatile flow when attempting to take down the dressing.  I evaluated the patient with ortho JONAS Roman at bedside and patient's Left BKA site had a saturated dressing in addition to a large amount of blood noted on the pads.  Patient was  given 2 units of pRBC and bolused with IVF.  He is currently mentating appropriately with HR 60-70s on betablocker, BP 80-110s.      Per patient's wife he did have a fall and concern that the area was due to hematoma initially.  Patient has been on Zosyn for concern of infection but cultures from OR on 12/1 have no growth.      PMH:[SL1.1]   Past Medical History:   Diagnosis Date     Alcoholism (H)      Amputated left leg (H)      Anemia      Anticardiolipin antibody syndrome (H)      Antiplatelet or antithrombotic long-term use      Aortic stenosis      Arthritis      Balance problem      Cardiomyopathy (H)      Coagulation disorder (H)     cardiolipinantibody syndrome     Coronary artery disease      Gastro-oesophageal reflux disease      Heart attack 2007     Hiatal hernia      Hypercholesterolemia      Hypertension      Left foot drop      Liver disease     alcoholic liver disease, alcoholic cirrohsosis, portal hypertension     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)     ?When diagnosed, patient not aware of this     Moderate mitral regurgitation      Monoclonal gammopathy      Neuropathy      Noninfectious ileitis     diverticulitis of colon     PE (pulmonary embolism) 2006     Prostate cancer (H) 2000    Treated with radiation (seed implants in 2000) -- no problems since     Renal disease     kidney stones     Syncope      Thoracic aortic aneurysm, without rupture      Thrombocytopenia (H)      Thrombosis of leg      Urethral stricture[SL1.2]        PSH:[SL1.1]   Past Surgical History:   Procedure Laterality Date     AMPUTATE LEG BELOW KNEE Left 04/2014    related to gangrene, started as foot ulcer related to neuropathy     APPENDECTOMY       ARTHROPLASTY KNEE Right 9/19/2014    Procedure: ARTHROPLASTY KNEE;  Surgeon: Saima Howard MD;  Location: [facility] OR     CARDIAC SURGERY      CABG     CHOLECYSTECTOMY       COLONOSCOPY       CYSTOSCOPY, DILATE URETHRA, COMBINED N/A 10/12/2016    Procedure:  COMBINED CYSTOSCOPY, DILATE URETHRA;  Surgeon: Dimitry Bello MD;  Location: [facility] OR     ENDOSCOPIC STRIPPING VEIN(S)       esophagogastroduodenoscopy       EYE SURGERY      cataracts     GENITOURINARY SURGERY      Prostate Seen Implants     HERNIA REPAIR      Umbilical     IR IVC FILTER PLACEMENT       ORTHOPEDIC SURGERY      (R) knee scope     ORTHOPEDIC SURGERY      total hip      ORTHOPEDIC SURGERY      elbow surgery[SL1.2]       MEDS:[SL1.1]   Current Facility-Administered Medications   Medication     0.9% sodium chloride BOLUS     ceFAZolin (ANCEF) intermittent infusion 2 g in 100 mL dextrose PRE-MIX     ceFAZolin (ANCEF) intermittent infusion 1 g (pre-mix)     0.9% sodium chloride infusion     [Auto Hold] melatonin tablet 1 mg     [Auto Hold] lidocaine 1 % 1 mL     [Auto Hold] lidocaine (LMX4) cream     [Auto Hold] sodium chloride (PF) 0.9% PF flush 3 mL     [Auto Hold] sodium chloride (PF) 0.9% PF flush 3 mL     [Auto Hold] naloxone (NARCAN) injection 0.1-0.4 mg     [Auto Hold] HYDROmorphone (PF) (DILAUDID) injection 0.3-0.5 mg     [Auto Hold] oxyCODONE IR (ROXICODONE) tablet 5-10 mg     [Auto Hold] hydrOXYzine (ATARAX) tablet 10 mg     [Auto Hold] ondansetron (ZOFRAN-ODT) ODT tab 4 mg    Or     [Auto Hold] ondansetron (ZOFRAN) injection 4 mg     [Auto Hold] senna-docusate (SENOKOT-S;PERICOLACE) 8.6-50 MG per tablet 1-2 tablet     [Auto Hold] potassium chloride SA (K-DUR/KLOR-CON M) CR tablet 20-40 mEq     [Auto Hold] potassium chloride (KLOR-CON) Packet 20-40 mEq     [Auto Hold] potassium chloride 10 mEq in 100 mL sterile water intermittent infusion (premix)     [Auto Hold] potassium chloride 10 mEq in 100 mL intermittent infusion with 10 mg lidocaine     [Auto Hold] potassium chloride 20 mEq in 50 mL intermittent infusion     [Auto Hold] LORazepam (ATIVAN) tablet 0.5-1 mg     [Auto Hold] docusate sodium (COLACE) capsule 100 mg     [Auto Hold] potassium chloride SA (K-DUR/KLOR-CON M) CR tablet 10 mEq      "[Auto Hold] gabapentin (NEURONTIN) tablet 600 mg     [Auto Hold] carvedilol (COREG) tablet 3.125 mg     [Auto Hold] acetaminophen (TYLENOL) tablet 650 mg     [Auto Hold] polyethylene glycol (MIRALAX/GLYCOLAX) Packet 17 g     [Auto Hold] piperacillin-tazobactam (ZOSYN) infusion 3.375 g     [Auto Hold] gabapentin (NEURONTIN) capsule 1,200 mg[SL1.2]       ALLG:[SL1.1]    Allergies   Allergen Reactions     Hmg-Coa-R Inhibitors Other (See Comments)     Rhabdomyolysis occurred within a couple days     Ciprofloxacin Itching     Severe itching \"like ants were crawling\"[SL1.2]       FH:[SL1.1]   Family History   Problem Relation Age of Onset     Lung Cancer Mother      Heart Failure Father       age 87[SL1.2]       SH:[SL1.1]   Social History     Social History     Marital status:      Spouse name: N/A     Number of children: 2     Years of education: N/A     Occupational History     Retired       Social History Main Topics     Smoking status: Never Smoker     Smokeless tobacco: Not on file     Alcohol use Yes      Comment: quit 10/2015; now 2-3 Vodkas/night     Drug use: No     Sexual activity: Not on file     Other Topics Concern     Not on file     Social History Narrative    , 2 children, retired . He does not have a living will but desires full code.  (last updated 2017)[SL1.2]        REVIEW OF SYSTEMS:  GENERAL: No fevers, chills, weight gain or loss  HEENT: No vision or hearing difficulties, no nasal symptoms  RESPIRATORY: No cough wheeze, or hemoptosys  CARDIAC: No chest pain, orthopnea, lower extremity edema, irregular heartbeat  GI: No abdominal pain, difficulty swallowing, nausea or vomiting  : no dysuria, urgency, frequency or hematuria  HEME/ONC: + abnormal bleeding, history of hypercoaguable state  NEURO: No numbness, weakness, dizziness or loss of consciousness  MUSCULOSKELETAL: No Joint or back pain  SKIN: + recent BKA infection, no rashes, growths, sores, " itching or hair loss    PHYSICAL EXAM:  Vitals: B/P: 99/59, T: 97.8, P: 60, R: 16  Gen: Alert oriented no acute distress  HEENT: Mucous membranes moist  Resp: Clear to auscultation bilaterally  CV: Regular rate rhythm, no murmurs, rubs or gallops  Abd: Soft, non-tender, non-distended no guarding or rigidity  Neuro:  Neuro intact, moves all extremities 4/4 strength equal  Ext: Left BKA dressing saturated, wound vac turned off[SL1.1], mild edema of lower extremity[SL1.3]      Labs: Most recent labs reviewed[SL1.1]  WBC   Date Value Ref Range Status   12/04/2017 5.6 4.0 - 11.0 10e9/L Final[SL1.2]   ]  Hgb 7 at 2am, now 8 at 0550  Cultures from OR on 12/1 negative to date    IMAGING:[SL1.1] no recent.[SL1.3]    A/P:  71 yo male with left BKA  POD#3 from I & D with acute active bleeding overnight.    To OR for control of bleeding, consent obtained with patient and wife.  Serial Hgb, transfusion prn  NPO, IVF[SL1.1], continue antibiotics follow up cultures.[SL1.3]    Keily Mandujano MD  Vascular Surgery Fellow  Pager [phone][SL1.1]     Revision History        User Key Date/Time User Provider Type Action    > SL1.3 12/4/2017 11:03 AM Lejeune, Stacey, MD Fellow Sign     SL1.2 12/4/2017  6:33 AM Lejeune, Stacey, MD Fellow      SL1.1 12/4/2017  6:31 AM Lejeune, Stacey, MD Fellow             Consults by Claire Salazar MD at 12/2/2017  9:19 AM     Author:  Claire Salazar MD Service:  Infectious Disease Author Type:  Physician    Filed:  12/2/2017 10:38 AM Date of Service:  12/2/2017  9:19 AM Creation Time:  12/2/2017  9:15 AM    Status:  Signed :  Claire Salazar MD (Physician)     Consult Orders:    1. Infectious Diseases IP Consult: Patient to be seen: Routine - within 24 hours; abscess left BKA stump; Consultant may enter orders: Yes [[id number]] ordered by Saima Howard MD at 12/01/17 1845                [facility]    Infectious Disease Consultation     Date of Admission:  11/29/2017  Date of  Consult (When I saw the patient): 12/02/17    Assessment & Plan   Antonio Ramirez is a 72 year old male who was admitted on 11/29/2017.     Impression:  1. 72 y.o male admitted with left BKA stump abscess.   2. CAD, Peripheral neuropathy, alcohol abuse, CKD stage 3.   3. Started on broad spectrum antibiotics with plans for I and D.     Recommendations:   Given CKD and unknown if MRSA is the cause for now continue on zosyn alone and discontinue vancomycin. Will follow up on the cultures and if MRSA will put on appropriate antibiotic. Given CKD I think enough vanco in the sytem for now.       Claire Salazar MD    Reason for Consult   Reason for consult: I was asked by Dr. Howard  to evaluate this patient for BKA stump infection .    Primary Care Physician   Main Hardy    Chief Complaint   BKA infection    History is obtained from the patient and medical records    History of Present Illness   Antonio Ramirez is a 72 year old male with CAD, CKD stage 3, chronic alcohol use with alcohol related liver disease in past, and idiopathic neuropathy History of gangrene and left BKA April 2014.  2 weeks ago in [place] saw his prosthetic yulisa who thought he might benefit from a tighter prosthesis rubber sleeve, and then 3-4 days ago he noted pain and swelling, progressively worse, temp up to 102, chills, and presented to ER today and ultrasound shows a fluid collection around the left BKA stump max 6.6 cm in size.       Past Medical History   I have reviewed this patient's medical history and updated it with pertinent information if needed.   Past Medical History:   Diagnosis Date     Alcoholism (H)      Amputated left leg (H)      Anemia      Anticardiolipin antibody syndrome (H)      Antiplatelet or antithrombotic long-term use      Aortic stenosis      Arthritis      Balance problem      Cardiomyopathy (H)      Coagulation disorder (H)     cardiolipinantibody syndrome     Coronary artery disease      Gastro-oesophageal  reflux disease      Heart attack 2007     Hiatal hernia      Hypercholesterolemia      Hypertension      Left foot drop      Liver disease     alcoholic liver disease, alcoholic cirrohsosis, portal hypertension     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)     ?When diagnosed, patient not aware of this     Moderate mitral regurgitation      Monoclonal gammopathy      Neuropathy      Noninfectious ileitis     diverticulitis of colon     PE (pulmonary embolism) 2006     Prostate cancer (H) 2000    Treated with radiation (seed implants in 2000) -- no problems since     Renal disease     kidney stones     Syncope      Thoracic aortic aneurysm, without rupture      Thrombocytopenia (H)      Thrombosis of leg      Urethral stricture        Past Surgical History   I have reviewed this patient's surgical history and updated it with pertinent information if needed.  Past Surgical History:   Procedure Laterality Date     AMPUTATE LEG BELOW KNEE Left 04/2014    related to gangrene, started as foot ulcer related to neuropathy     APPENDECTOMY       ARTHROPLASTY KNEE Right 9/19/2014    Procedure: ARTHROPLASTY KNEE;  Surgeon: Saima Howard MD;  Location:  OR     CARDIAC SURGERY      CABG     CHOLECYSTECTOMY       COLONOSCOPY       CYSTOSCOPY, DILATE URETHRA, COMBINED N/A 10/12/2016    Procedure: COMBINED CYSTOSCOPY, DILATE URETHRA;  Surgeon: Dimitry Bello MD;  Location:  OR     ENDOSCOPIC STRIPPING VEIN(S)       esophagogastroduodenoscopy       EYE SURGERY      cataracts     GENITOURINARY SURGERY      Prostate Seen Implants     HERNIA REPAIR      Umbilical     IR IVC FILTER PLACEMENT       ORTHOPEDIC SURGERY      (R) knee scope     ORTHOPEDIC SURGERY      total hip      ORTHOPEDIC SURGERY      elbow surgery       Prior to Admission Medications   Prior to Admission Medications   Prescriptions Last Dose Informant Patient Reported? Taking?   ASPIRIN EC PO 11/29/2017 at Unknown time  Yes Yes   Sig: Take 81 mg by  "mouth daily   CARVEDILOL PO 11/29/2017 at Unknown time  Yes Yes   Sig: Take 3.125 mg by mouth 2 times daily (with meals)    Gabapentin (NEURONTIN PO) 11/29/2017 at Unknown time  Yes Yes   Sig: Take 600 mg by mouth 3 times daily 600 mg in morning, 600 mg midday & 1200 mg at HS   WARFARIN SODIUM PO 11/28/2017 at Unknown time  Yes Yes   Sig: Take 5 mg by mouth daily 5mg Monday to Saturday and no dose on sundays.   evolocumab (REPATHA) 140 MG/ML prefilled syringe 11/17/2017  Yes Yes   Sig: Inject 1 pen subcutaneously every 2 weeks. For sample use only   order for DME   No No   Sig: [facility] Phone [phone] Fax [phone]    Primary Dressing Woun'Dres Gel   Qty DO NOT DISPENSE  Secondary Dressing Mepilex 4x4 Qty 60 (for two wounds)  Length of Need: 1 month  Frequency of dressing change: daily   potassium chloride SA (K-DUR/KLOR-CON M) 10 MEQ CR tablet 11/29/2017 at Unknown time  Yes Yes   Sig: Take 10 mEq by mouth daily   torsemide (DEMADEX) 20 MG tablet   Yes Yes   Sig: Take 20 mg by mouth 2 times daily       Facility-Administered Medications: None     Allergies   Allergies   Allergen Reactions     Hmg-Coa-R Inhibitors Other (See Comments)     Rhabdomyolysis occurred within a couple days     Ciprofloxacin Itching     Severe itching \"like ants were crawling\"       Immunization History   Immunization History   Administered Date(s) Administered     Influenza (High Dose) 3 valent vaccine 09/20/2014, 09/01/2016, 09/25/2017     Pneumococcal (PCV 13) 06/21/2016     Pneumococcal 23 valent 09/22/2014     TDAP Vaccine (Adacel) 10/06/2016       Social History   I have reviewed this patient's social history and updated it with pertinent information if needed. Antonio KINGS Ramirez  reports that he has never smoked. He does not have any smokeless tobacco history on file. He reports that he drinks alcohol. He reports that he does not use illicit drugs.    Family History   I have reviewed this patient's family history and " updated it with pertinent information if needed.   Family History   Problem Relation Age of Onset     Lung Cancer Mother      Heart Failure Father       age 87       Review of Systems   The 10 point Review of Systems is negative other than noted in the HPI or here.     Physical Exam   Temp: 98.6  F (37  C) Temp src: Oral BP: 104/58 Pulse: 60 Heart Rate: 70 Resp: 16 SpO2: 94 % O2 Device: None (Room air) Oxygen Delivery: 4 LPM  Vital Signs with Ranges  Temp:  [97.6  F (36.4  C)-99.1  F (37.3  C)] 98.6  F (37  C)  Pulse:  [60] 60  Heart Rate:  [54-70] 70  Resp:  [9-20] 16  BP: (103-126)/(56-88) 104/58  SpO2:  [84 %-100 %] 94 %  220 lbs 0 oz    GENERAL APPEARANCE:  alert and no distress  EYES: Eyes grossly normal to inspection, PERRL and conjunctivae and sclerae normal  HENT: ear canals and TM's normal and nose and mouth without ulcers or lesions  NECK: no adenopathy, no asymmetry, masses, or scars and thyroid normal to palpation  RESP: lungs clear to auscultation - no rales, rhonchi or wheezes  CV: regular rates and rhythm, normal S1 S2, no S3 or S4 and no murmur, click or rub  LYMPHATICS: normal ant/post cervical and supraclavicular nodes  ABDOMEN: soft, nontender, without hepatosplenomegaly or masses and bowel sounds normal  MS:[DS1.1] wound vac in place[DS1.2]   SKIN: no suspicious lesions or rashes      Data   All laboratory data reviewed    Recent Labs  Lab 17  1756 17  1811 17  1649   CULT PENDING  PENDING No growth after 3 days No growth after 3 days     Recent Labs   Lab Test  17   1756  17   1811  17   1649   CULT  PENDING  PENDING  No growth after 3 days  No growth after 3 days[DS1.1]                Revision History        User Key Date/Time User Provider Type Action    > DS1.2 2017 10:38 AM Claire Salazar MD Physician Sign     DS1.1 2017  9:15 AM Claire Salazar MD Physician                      Progress Notes - Physician (Notes from 17 through 12/10/17)       Progress Notes by Jc Yepez MD at 12/9/2017  8:59 PM     Author:  Jc Yepez MD Service:  Hospitalist Author Type:  Physician    Filed:  12/9/2017  8:59 PM Date of Service:  12/9/2017  8:59 PM Creation Time:  12/9/2017  8:59 PM    Status:  Signed :  Jc Yepez MD (Physician)         Will give 5mg warfarin dose one time as patient did not discharge today[SF1.1]     Revision History        User Key Date/Time User Provider Type Action    > SF1.1 12/9/2017  8:59 PM Jc Yepez MD Physician Sign            Progress Notes by Angus Sofia LSW at 12/9/2017  5:06 PM     Author:  Angus Sofia LSW Service:  Social Work Author Type:      Filed:  12/9/2017  5:08 PM Date of Service:  12/9/2017  5:06 PM Creation Time:  12/9/2017  5:06 PM    Status:  Signed :  Angus Sofia LSW ()         SW:  D: Spoke with patient regarding facilities that he would like to discharge to.  Patient [facility] and [facility].   None of those facilities had any availability for today for admissions.  SW will continue to search for availability for discharge.     CRISTOBAL Kemp, SW[AH1.1]         Revision History        User Key Date/Time User Provider Type Action    > AH1.1 12/9/2017  5:08 PM Angus Sofia LSW  Sign            Progress Notes by Parth Farias MD at 12/9/2017 10:30 AM     Author:  Parth Farias MD Service:  Hospitalist Author Type:  Physician    Filed:  12/9/2017 11:00 AM Date of Service:  12/9/2017 10:30 AM Creation Time:  12/9/2017 10:30 AM    Status:  Signed :  Parth Farias MD (Physician)         [facility] Hospitalist Progress Note    Assessment & Plan   Antonio Ramirez is a 72 year old male who was admitted on 11/29/2017 for left BKA abscess and found to have MSSA and has wound vac on BKA:    Impression:   Principal Problem:    Abscess of Left BKA --  S/P I&D 12/1/17, MSSA[AH1.1]. S/p revision bka stump and closure of wound on 12/8.  - Cont ancef and switch to augmentin at d/c.  - Further plan per ortho.    Chronic issues:[AH1.2]    Alcoholic cirrhosis of liver (H)    Chronic kidney disease, stage III (moderate)    CAD, MI in 2007 -- S/P CABG x 3 in 2007    Prostate cancer -- S/P Radiative Seed implants in 2000 (no problems since)    Antiphospholipid Jina Syn, Hypercoag (DVT, PE) -- has IVC filt and Warfarin (currently held)    Thoracic aortic aneurysm without rupture (H)    Chronic congestive heart failure, unspecified congestive heart failure type (H)    Paroxysmal a-fib (H) -- normally on Warfarin (currently held)    Thrombocytopenia -- suspect related to alcohol use, resolved    Anemia -- acute blood loss      Plan:  Continue Antibiotics (Ancef 2 gm IV q8h).[AH1.1] Coumadin resumed.[AH1.3]    DVT Prophylaxis: Currently on SQ heparin for DVT prophylaxis[AH1.1]  (cont while INR is subtherap).[AH1.4]  Code Status: Full Code    Disposition:[AH1.1] today if TCU bed is available. 10 days of augmentin ordered.[AH1.3]      Interval History[AH1.1]   Pain is fairly controlled.[AH1.3]    Physical Exam   Temp: 97.9  F (36.6  C) Temp src: Oral BP: 118/68 Pulse: 61 Heart Rate: 68 Resp: 16 SpO2: 96 % O2 Device: None (Room air) Oxygen Delivery: 3 LPM  Vitals:    11/29/17 1629 12/06/17 0600 12/08/17 0736   Weight: 99.8 kg (220 lb) 105.4 kg (232 lb 5.8 oz) 105.8 kg (233 lb 4 oz)     Vital Signs with Ranges  Temp:  [97  F (36.1  C)-98.3  F (36.8  C)] 97.9  F (36.6  C)  Pulse:  [55-61] 61  Heart Rate:  [53-68] 68  Resp:  [9-21] 16  BP: ()/(53-89) 118/68  SpO2:  [95 %-100 %] 96 %  I/O last 3 completed shifts:  In: 1309 [P.O.:360; I.V.:949]  Out: 650 [Urine:600; Blood:50]    Constitutional: Awake, alert, cooperative, no apparent distress  Respiratory: Clear to auscultation bilaterally, no crackles or wheezing  Cardiovascular: Regular rate and rhythm, normal S1 and S2, and 2/6  systolic murmur left sternal border  GI: Normal bowel sounds, soft, non-distended, non-tender  Extrem: left BKA stump with wound vac -- but redness and swelling much improved compared to admission.   Neuro: Ox3, decreased light touch to mid shin with right leg and distal BKA on left leg.     Medications     lactated ringers 75 mL/hr at 12/08/17 2139       sodium chloride (PF)  3 mL Intracatheter Q8H     heparin  5,000 Units Subcutaneous Q8H     acetaminophen  975 mg Oral Q8H     senna-docusate  2 tablet Oral BID     lactobacillus rhamnosus (GG)  1 capsule Oral Daily     sodium phosphate  1 enema Rectal Once     docusate sodium  200 mg Oral BID     gabapentin  1,200 mg Oral BID     gabapentin (NEURONTIN) tablet 2,400 mg  2,400 mg Oral Q24H     ceFAZolin  2 g Intravenous Q8H     potassium chloride SA  10 mEq Oral Daily     carvedilol (COREG) tablet 3.125 mg  3.125 mg Oral BID w/meals       Data     Recent Labs  Lab 12/09/17  0847 12/08/17  0750 12/07/17  0813  12/05/17  0750  12/04/17  0806 12/04/17  0550   WBC  --  7.4  --   --  7.7  --  6.4 5.6   HGB 9.3* 9.2* 8.6*  --  9.5*  < > 8.0* 8.0*   MCV  --  96  --   --  91  --  95 95    184 164  < > 142*  --  156 127*   INR 1.16* 1.25*  --   --   --   --  1.65*  --    NA  --  137  --   --  137  --   --  138   POTASSIUM  --  4.2  --   --  4.2  --   --  4.2   CHLORIDE  --  107  --   --  106  --   --  105   CO2  --  25  --   --  23  --   --  26   BUN  --  10  --   --  15  --   --  18   CR  --  1.19  --   --  1.08  --   --  1.43*   ANIONGAP  --  5  --   --  8  --   --  7   BENNIE  --  8.6  --   --  8.2*  --   --  7.5*   GLC  --  84  --   --  121*  --   --  104*   < > = values in this interval not displayed.    Imaging:   No results found for this or any previous visit (from the past 24 hour(s)).[AH1.1]      Revision History        User Key Date/Time User Provider Type Action    > AH1.3 12/9/2017 11:00 AM Parth Farias MD Physician Sign     AH1.4 12/9/2017 10:36 AM Dinora,  "Parth COOK MD Physician      AH1.2 2017 10:33 AM Parth Farias MD Physician      AH1.1 2017 10:30 AM Parth Farias MD Physician             Progress Notes by Saima Howard MD at 2017  7:21 AM     Author:  Saima Howard MD Service:  Orthopedics Author Type:  Physician    Filed:  2017  7:24 AM Date of Service:  2017  7:21 AM Creation Time:  2017  7:21 AM    Status:  Signed :  Saima Howard MD (Physician)         Antonio Ramirez  2017  POD # 1 sp revision bka stump and closure of wound.    Admit Date:  2017      Doing well.  Normal healing wound.  No excessive bleeding  Pain well-controlled.  Objective:  Blood pressure 109/60, pulse 55, temperature 98.3  F (36.8  C), temperature source Oral, resp. rate 16, height 1.854 m (6' 1\"), weight 105.8 kg (233 lb 4 oz), SpO2 97 %.    Temperatures:  Current - Temp: 98.3  F (36.8  C); Max - Temp  Av.7  F (36.5  C)  Min: 97  F (36.1  C)  Max: 98.3  F (36.8  C)  Pulse range: Pulse  Av  Min: 55  Max: 55  Blood pressure range: Systolic (24hrs), Av , Min:99 , Max:147   ; Diastolic (24hrs), Av, Min:53, Max:89    Exam:  CMS: intact  alert, stable, wound ok  Pt resting comfortably      Labs:  Recent Labs   Lab Test  17   0750  17   0750  17   0550   POTASSIUM  4.2  4.2  4.2     Recent Labs   Lab Test  17   0750  17   0813  17   0750   HGB  9.2*  8.6*  9.5*     Recent Labs   Lab Test  17   0750  17   0806  17   1516   INR  1.25*  1.65*  1.65*     Recent Labs   Lab Test  17   0750  17   0813  17   1455   PLT  184  164  151       PLAN: Non weight bearing  Pain control measures  Additional problems to be followed by medicine physician  Pt ready for DC from ortho stand point. He should return to my clinic next week for dressing change. [phone].[LV1.1]         Revision History        User Key Date/Time User Provider Type Action    > " LV1.1 12/9/2017  7:24 AM Saima Howard MD Physician Sign            Progress Notes by Davie Osborn MD at 12/8/2017 10:47 AM     Author:  Davie Osborn MD Service:  Hospitalist Author Type:  Physician    Filed:  12/9/2017 12:04 AM Date of Service:  12/8/2017 10:47 AM Creation Time:  12/8/2017 11:47 PM    Status:  Signed :  Davie Osborn MD (Physician)         [facility]    Hospitalist Progress Note    Assessment & Plan   Antonio Ramirez is a 72 year old male who was admitted on 11/29/2017 for left BKA abscess and found to have MSSA and has wound vac on BKA:    Impression:   Principal Problem:    Abscess of Left BKA -- S/P I&D 12/1/17, MSSA  Active Problems:    Alcoholic cirrhosis of liver (H)    Chronic kidney disease, stage III (moderate)    CAD, MI in 2007 -- S/P CABG x 3 in 2007    Prostate cancer -- S/P Radiative Seed implants in 2000 (no problems since)    Antiphospholipid Jina Syn, Hypercoag (DVT, PE) -- has IVC filt and Warfarin (currently held)    Thoracic aortic aneurysm without rupture (H)    Chronic congestive heart failure, unspecified congestive heart failure type (H)    Paroxysmal a-fib (H) -- normally on Warfarin (currently held)    Thrombocytopenia -- suspect related to alcohol use, resolved    Anemia -- acute blood loss      Plan:  Continue Antibiotics (Ancef 2 gm IV q8h).[JM1.1]  BKA revision today, then resume warfarin 7.5 mg this PM and SQ heparin tomorrow.[JM1.2]     DVT Prophylaxis: Currently on SQ heparin for DVT prophylaxis    Code Status: Full Code    Disposition: Expected discharge in ?[JM1.1]1-2[JM1.2] days once Left BKA infection stable (infection improving and no problems with bleeding).  Might need TCU for wound care depending on wound care needs at discharge.    Davie Osborn MD  Pager [phone]  Cell Phone [phone]  Text Page (7am to 6pm)    Interval History   Overall feels fine, bowels moved yesterday.   Wound was clean when Wound vac taken off, no further bleeding reported.    Physical Exam   Temp: 98.2  F (36.8  C) Temp src: Oral BP: 130/72 Pulse: 55 Heart Rate: 64 Resp: 16 SpO2: 100 % O2 Device: Nasal cannula Oxygen Delivery: 3 LPM  Vitals:    11/29/17 1629 12/06/17 0600 12/08/17 0736   Weight: 99.8 kg (220 lb) 105.4 kg (232 lb 5.8 oz) 105.8 kg (233 lb 4 oz)     Vital Signs with Ranges  Temp:  [97  F (36.1  C)-98.2  F (36.8  C)] 98.2  F (36.8  C)  Pulse:  [55] 55  Heart Rate:  [55-67] 64  Resp:  [9-21] 16  BP: (104-147)/(53-89) 130/72  SpO2:  [94 %-100 %] 100 %  I/O last 3 completed shifts:  In: 3280 [P.O.:360; I.V.:2920]  Out: 350 [Urine:300; Blood:50]    Constitutional: Awake, alert, cooperative, no apparent distress  Respiratory: Clear to auscultation bilaterally, no crackles or wheezing  Cardiovascular: Regular rate and rhythm, normal S1 and S2, and 2/6 systolic murmur left sternal border  GI: Normal bowel sounds, soft, non-distended, non-tender  Extrem: left BKA stump with wound vac -- but redness and swelling much improved compared to admission.   Neuro: Ox3, decreased light touch to mid shin with right leg and distal BKA on left leg.     Medications     lactated ringers 75 mL/hr at 12/08/17 2139       sodium chloride (PF)  3 mL Intracatheter Q8H     [START ON 12/9/2017] heparin  5,000 Units Subcutaneous Q8H     acetaminophen  975 mg Oral Q8H     senna-docusate  2 tablet Oral BID     lactobacillus rhamnosus (GG)  1 capsule Oral Daily     sodium phosphate  1 enema Rectal Once     docusate sodium  200 mg Oral BID     gabapentin  1,200 mg Oral BID     gabapentin (NEURONTIN) tablet 2,400 mg  2,400 mg Oral Q24H     ceFAZolin  2 g Intravenous Q8H     potassium chloride SA  10 mEq Oral Daily     carvedilol (COREG) tablet 3.125 mg  3.125 mg Oral BID w/meals       Data     Recent Labs  Lab 12/08/17  0750 12/07/17  0813 12/06/17  1455 12/05/17  0750  12/04/17  0806 12/04/17  0550   WBC 7.4  --   --  7.7  --  6.4 5.6    HGB 9.2* 8.6*  --  9.5*  < > 8.0* 8.0*   MCV 96  --   --  91  --  95 95    164 151 142*  --  156 127*   INR 1.25*  --   --   --   --  1.65*  --      --   --  137  --   --  138   POTASSIUM 4.2  --   --  4.2  --   --  4.2   CHLORIDE 107  --   --  106  --   --  105   CO2 25  --   --  23  --   --  26   BUN 10  --   --  15  --   --  18   CR 1.19  --   --  1.08  --   --  1.43*   ANIONGAP 5  --   --  8  --   --  7   BENNIE 8.6  --   --  8.2*  --   --  7.5*   GLC 84  --   --  121*  --   --  104*   < > = values in this interval not displayed.    Imaging:   No results found for this or any previous visit (from the past 24 hour(s)).[JM1.1]      Revision History        User Key Date/Time User Provider Type Action    > FRANCESCA1.2 12/9/2017 12:04 AM Davie Phillips MD Physician Sign     JM1.1 12/8/2017 11:47 PM Davie Phillips MD Physician             Progress Notes by Gracy Jacobs LSW at 12/8/2017  1:03 PM     Author:  Gracy Jacobs LSW Service:  (none) Author Type:      Filed:  12/8/2017  1:07 PM Date of Service:  12/8/2017  1:03 PM Creation Time:  12/8/2017  1:03 PM    Status:  Signed :  Gracy Jacobs LSW ()         Care Transition Initial Assessment -   Reason For Consult: discharge planning  Met with: Patient    Principal Problem:    Abscess of Left BKA -- S/P I&D 12/1/17, [facility]  Active Problems:    Alcoholic cirrhosis of liver (H)    Chronic kidney disease, stage III (moderate)    CAD, MI in 2007 -- S/P CABG x 3 in 2007    Prostate cancer -- S/P Radiative Seed implants in 2000 (no problems since)    Antiphospholipid Jina Syn, Hypercoag (DVT, PE) -- has IVC filt and Warfarin    Thoracic aortic aneurysm without rupture (H)    Chronic congestive heart failure, unspecified congestive heart failure type (H)    Paroxysmal a-fib (H)    Cellulitis and abscess of leg    Thrombocytopenia -- suspect related to alcohol use    Abscess    Infection of left below knee amputation  (H)         DATA  Lives With: spouse  Living Arrangements: apartment  Description of Support System: Supportive, Involved  Who is your support system?: Wife  Support Assessment: Adequate family and caregiver support.   Identified issues/concerns regarding health management:    SW received request for consult to assist with discharge planning. Patient admitted Inpatient Status on 11/29/17 for Abcess of Left BKA. Tentative discharge date 12/9/17. SW met with patient. Patient lives with his wife in an apartment. Patient is mostly independent at baseline. Discussed with patient returning home with home care versus TCU. Answered patient's questions regarding TCU (services provided), coverage, facilities, etc. Patient stating he feels TCU would be the best option currently due to his needs at discharge. Patient stating his first choice is [place] due to location. Patient requesting FAHEEM send a referral. Patient stated he would likely be interested in home care following TCU, should he need it. SW sent referral per protocol to [facility].      Resources List: Transitional Care     Quality Of Family Relationships: supportive, helpful, involved  Transportation Available: family or friend will provide      ASSESSMENT  Cognitive Status:  awake, alert and oriented  Concerns to be addressed: discharge planning.       PLAN  Financial costs for the patient includes: N/A.  Patient given options and choices for discharge: Discussed home with home care vs TCU.  Patient/family is agreeable to the plan?  Yes  Patient Goals and Preferences: TCU.  Patient anticipates discharging to: TCU.    CRISTOBAL Mckeon, CARISSA[LS1.1]             Revision History        User Key Date/Time User Provider Type Action    > LS1.1 12/8/2017  1:07 PM Gracy Jacobs LSW  Sign            Progress Notes by Ankita Coyne RN at 12/8/2017 11:06 AM     Author:  Ankita Coyne, RN Service:  (none) Author Type:  Registered Nurse    Filed:  12/8/2017 11:11 AM  Date of Service:  12/8/2017 11:06 AM Creation Time:  12/8/2017 11:06 AM    Status:  Signed :  Ankita Coyne RN (Registered Nurse)         [facility]  Met with pt at the request of SHIMON Hernandez Care Coordinator to discuss possible home care services. Provided general overview of home care services including homebound status requirement.  Answered patient's questions. Plan is for likely discharge over the weekend; at this it is unclear if he will discharge to skilled nursing facility or directly home.  Provided HC brochure with 24 hour phone number for [facility] for any questions or concerns.[SH1.1]     Revision History        User Key Date/Time User Provider Type Action    > SH1.1 12/8/2017 11:11 AM Ankita Coyne RN Registered Nurse Sign            Progress Notes by Claire Salazar MD at 12/8/2017 10:34 AM     Author:  Claire Salazar MD Service:  Infectious Disease Author Type:  Physician    Filed:  12/8/2017 10:34 AM Date of Service:  12/8/2017 10:34 AM Creation Time:  12/8/2017 10:34 AM    Status:  Signed :  Claire Salazar MD (Physician)         [facility]    Infectious Disease Progress Note    Date of Service (when I saw the patient): 12/08/2017     Assessment & Plan   Antonio Ramirez is a 72 year old male who was admitted on 11/29/2017.       Impression:  1. 72 y.o male admitted with left BKA stump abscess. S/P I and D.   2. CAD, Peripheral neuropathy, alcohol abuse, CKD stage 3.   3. Noted GPC in the cultures. Further identified as MSSA     Recommendations:   On ancef for the MSSA seen in the superficial cultures, OR notes reviewed, no mention of infection,cultures from the OR are so far neg. Talked to Vascular, the area did not look particularly infected in the OR, talked with ortho no concern of osteo.   Will keep on Ancef while admitted when ready for discharge can go on 10 days on Augmentin.  Signing off please call with ques.       Claire Salazar MD    Interval  History   Remains afebrile GPC in the cultures further identified as MSSA     Physical Exam   Temp: 98.1  F (36.7  C) Temp src: Oral BP: 114/65 Pulse: 55 Heart Rate: 57 Resp: 16 SpO2: 94 % O2 Device: None (Room air)    Vitals:    11/29/17 1629 12/06/17 0600 12/08/17 0736   Weight: 99.8 kg (220 lb) 105.4 kg (232 lb 5.8 oz) 105.8 kg (233 lb 4 oz)     Vital Signs with Ranges  Temp:  [98.1  F (36.7  C)-98.4  F (36.9  C)] 98.1  F (36.7  C)  Pulse:  [49-58] 55  Heart Rate:  [57] 57  Resp:  [16-18] 16  BP: ()/(59-71) 114/65  SpO2:  [94 %-97 %] 94 %    Constitutional: Awake, alert,  Lungs: Clear to auscultation bilaterally, no crackles or wheezing  Cardiovascular: Regular rate and rhythm, normal S1 and S2, and no murmur noted  Abdomen: Normal bowel sounds, soft, non-distended, non-tender  Skin: dressing on the stump   Other:    Medications     lactated ringers 75 mL/hr at 12/08/17 1005       senna-docusate  2 tablet Oral BID     sodium chloride (PF)  3 mL Intracatheter Q8H     acetaminophen  975 mg Oral Q8H     lactobacillus rhamnosus (GG)  1 capsule Oral Daily     sodium phosphate  1 enema Rectal Once     docusate sodium  200 mg Oral BID     gabapentin  1,200 mg Oral BID     gabapentin (NEURONTIN) tablet 2,400 mg  2,400 mg Oral Q24H     sodium chloride (PF)  3 mL Intracatheter Q8H     ceFAZolin  2 g Intravenous Q8H     potassium chloride SA  10 mEq Oral Daily     carvedilol (COREG) tablet 3.125 mg  3.125 mg Oral BID w/meals       Data   All microbiology laboratory data reviewed.  Recent Labs   Lab Test  12/08/17   0750  12/07/17   0813  12/06/17   1455  12/05/17   0750   12/04/17   0806   WBC  7.4   --    --   7.7   --   6.4   HGB  9.2*  8.6*   --   9.5*   < >  8.0*   HCT  28.1*   --    --   27.9*   --   23.0*   MCV  96   --    --   91   --   95   PLT  184  164  151  142*   --   156    < > = values in this interval not displayed.     Recent Labs   Lab Test  12/08/17   0750  12/05/17   0750  12/04/17   0550   CR  1.19   1.08  1.43*     No lab results found.  Recent Labs   Lab Test  12/06/17   1159  12/04/17   0801  12/01/17   1756  11/29/17   1811  11/29/17   1649   CULT  No growth  Culture negative monitoring continues  Culture negative after 3 days  No growth  Culture negative monitoring continues  Light growth  Staphylococcus aureus  This isolate is presumed to be clindamycin resistant based on detection of inducible   clindamycin resistance. Erythromycin and clindamycin are resistant, therefore, they are   not recommended for use.  *  Culture negative monitoring continues  No growth  No growth[DS1.1]        Revision History        User Key Date/Time User Provider Type Action    > DS1.1 12/8/2017 10:34 AM Claire Salazar MD Physician Sign            Progress Notes by Claire Salazar MD at 12/7/2017 12:11 PM     Author:  Claire Salazar MD Service:  Infectious Disease Author Type:  Physician    Filed:  12/7/2017  2:54 PM Date of Service:  12/7/2017 12:11 PM Creation Time:  12/7/2017 12:11 PM    Status:  Signed :  Claire Salazar MD (Physician)         [facility]    Infectious Disease Progress Note    Date of Service (when I saw the patient): 12/07/2017     Assessment & Plan   Antonio Ramirez is a 72 year old male who was admitted on 11/29/2017.       Impression:  1. 72 y.o male admitted with left BKA stump abscess. S/P I and D.   2. CAD, Peripheral neuropathy, alcohol abuse, CKD stage 3.   3. Noted GPC in the cultures. Further identified as MSSA     Recommendations:   On ancef for the MSSA seen in the superficial cultures, OR notes reviewed, no mention of infection,cultures from the OR are so far neg. Talked to Vascular, the area did not look particularly infected in the OR,[DS1.1] talked with ortho no concern of osteo.   Will keep on Ancef while admitted when ready for discharge can go on 10 days on Augmentin.[DS1.2]    Claire Salazar MD    Interval History   Remains afebrile GPC in the cultures further  identified as MSSA     Physical Exam   Temp: 98.1  F (36.7  C) Temp src: Oral BP: 114/68 Pulse: 50 Heart Rate: 50 Resp: 16 SpO2: 97 % O2 Device: None (Room air) Oxygen Delivery: 2 LPM  Vitals:    11/29/17 1629 12/06/17 0600   Weight: 99.8 kg (220 lb) 105.4 kg (232 lb 5.8 oz)     Vital Signs with Ranges  Temp:  [96  F (35.6  C)-98.1  F (36.7  C)] 98.1  F (36.7  C)  Pulse:  [50] 50  Heart Rate:  [49-58] 50  Resp:  [8-16] 16  BP: (108-129)/(65-78) 114/68  SpO2:  [92 %-100 %] 97 %    Constitutional: Awake, alert,  Lungs: Clear to auscultation bilaterally, no crackles or wheezing  Cardiovascular: Regular rate and rhythm, normal S1 and S2, and no murmur noted  Abdomen: Normal bowel sounds, soft, non-distended, non-tender  Skin: dressing on the stump   Other:    Medications     lactated ringers 75 mL/hr at 12/07/17 0537       heparin  5,000 Units Subcutaneous Q8H     senna-docusate  2 tablet Oral BID     sodium chloride (PF)  3 mL Intracatheter Q8H     acetaminophen  975 mg Oral Q8H     lactobacillus rhamnosus (GG)  1 capsule Oral Daily     sodium phosphate  1 enema Rectal Once     docusate sodium  200 mg Oral BID     gabapentin  1,200 mg Oral BID     gabapentin (NEURONTIN) tablet 2,400 mg  2,400 mg Oral Q24H     sodium chloride (PF)  3 mL Intracatheter Q8H     ceFAZolin  2 g Intravenous Q8H     potassium chloride SA  10 mEq Oral Daily     carvedilol (COREG) tablet 3.125 mg  3.125 mg Oral BID w/meals       Data   All microbiology laboratory data reviewed.  Recent Labs   Lab Test  12/07/17   0813  12/06/17   1455  12/05/17   0750  12/04/17   1955   12/04/17   0806  12/04/17   0550   WBC   --    --   7.7   --    --   6.4  5.6   HGB  8.6*   --   9.5*  10.2*   < >  8.0*  8.0*   HCT   --    --   27.9*   --    --   23.0*  23.4*   MCV   --    --   91   --    --   95  95   PLT  164  151  142*   --    --   156  127*    < > = values in this interval not displayed.     Recent Labs   Lab Test  12/05/17   0750  12/04/17   0550   "17   0616   CR  1.08  1.43*  1.58*     No lab results found.  Recent Labs   Lab Test  17   1159  17   0801  17   1756  17   1811  17   1649   CULT  Culture negative monitoring continues  PENDING  No growth  Culture negative after 2 days  Culture negative monitoring continues  Light growth  Staphylococcus aureus  This isolate is presumed to be clindamycin resistant based on detection of inducible   clindamycin resistance. Erythromycin and clindamycin are resistant, therefore, they are   not recommended for use.  *  Culture negative monitoring continues  No growth  No growth[DS1.1]        Revision History        User Key Date/Time User Provider Type Action    > DS1.2 2017  2:54 PM Claire Salazar MD Physician Sign     DS1.1 2017 12:11 PM Claire Salazar MD Physician             Progress Notes by Saima Howard MD at 2017  2:52 PM     Author:  Saima Howard MD Service:  Orthopedics Author Type:  Physician    Filed:  2017  2:53 PM Date of Service:  2017  2:52 PM Creation Time:  2017  2:52 PM    Status:  Signed :  Saima Howard MD (Physician)         Antonio Ramirez  2017  POD # 1 sp I&D left bka stump  Admit Date:  2017      Doing well.  Clean wound without signs of infection.  Normal healing wound.  No immediate surgical complications identified.  No excessive bleeding  Objective:  Blood pressure 112/65, pulse (!) 49, temperature 98.2  F (36.8  C), temperature source Oral, resp. rate 16, height 1.854 m (6' 1\"), weight 105.4 kg (232 lb 5.8 oz), SpO2 97 %.    Temperatures:  Current - Temp: 98.2  F (36.8  C); Max - Temp  Av.5  F (36.4  C)  Min: 96  F (35.6  C)  Max: 98.2  F (36.8  C)  Pulse range: Pulse  Av.5  Min: 49  Max: 50  Blood pressure range: Systolic (24hrs), Av , Min:108 , Max:122   ; Diastolic (24hrs), Av, Min:65, Max:71    Exam:  CMS: intact  alert, stable, wound ok    Labs:  Recent Labs   Lab " "Test  12/05/17   0750  12/04/17   0550  12/03/17   0616   POTASSIUM  4.2  4.2  4.2     Recent Labs   Lab Test  12/07/17   0813  12/05/17   0750  12/04/17   1955   HGB  8.6*  9.5*  10.2*     Recent Labs   Lab Test  12/04/17   0806  12/01/17   1516  12/01/17   0712   INR  1.65*  1.65*  1.91*     Recent Labs   Lab Test  12/07/17   0813  12/06/17   1455  12/05/17   0750   PLT  164  151  142*       PLAN: Non weight bearing  Pain control measures  Additional problems to be followed by medicine physician  Or on Friday for closure of wound and tibial shortening.[LV1.1]          Revision History        User Key Date/Time User Provider Type Action    > LV1.1 12/7/2017  2:53 PM Saima Howard MD Physician Sign            Progress Notes by Jeffrey Zelaya at 12/7/2017  2:52 PM     Author:  Jeffrey Zelaya Service:  Spiritual Health Author Type:      Filed:  12/7/2017  2:52 PM Date of Service:  12/7/2017  2:52 PM Creation Time:  12/7/2017  2:52 PM    Status:  Signed :  Jeffrey Zelaya ()         SPIRITUAL HEALTH SERVICES Progress Note  [facility]    SH, LOS attempted visit. The patient was sleeping. The spouse indicated interest in SH at another time.      SH will follow as LOS persists.        Jeffrey Zelaya  Chaplain Resident[DC1.1]     Revision History        User Key Date/Time User Provider Type Action    > DC1.1 12/7/2017  2:52 PM Jeffrey Zelaya  Sign            Progress Notes by Ya Denny RD, LD at 12/7/2017 11:41 AM     Author:  Ya Denny RD, LD Service:  Nutrition Author Type:  Registered Dietitian    Filed:  12/7/2017 11:53 AM Date of Service:  12/7/2017 11:41 AM Creation Time:  12/7/2017 11:41 AM    Status:  Signed :  Ya Denny RD, LD (Registered Dietitian)         BRIEF NUTRITION ASSESSMENT      REASON FOR ASSESSMENT:  los    NUTRITION HISTORY:  Pt was not very forthcoming with hx, he was rather evasive.  States he doesn't follow any special diet, \"I do my own " "thing.\"  States he eats 1 meal/day \"more or less\".   When I pointed out that he's lost wt (see below) and asked what that was attributed to he stated, \"I was too fat.\"  Per H&P:  chronic alcohol use with alcohol related liver disease in past,    CURRENT DIET AND INTAKE:  Diet:  Regular   Per flow sheets, intake has been 50-75%. \"Typical hospital food, not much to say\".  He states he's also been order take-out some meals.                ANTHROPOMETRICS:  Height: 6' 1\"  Weight: 105.4 kg  BMI: 30.66 kg/m2  IBW:  79.4 kg - adjusted for left BKA  Weight Status: Obesity Grade I BMI 30-34.9  %IBW: 133%  Weight History: Pt has lost 14# (6%) x 6 months, he is unable to give a reason but is not concerned.[CM1.1]  Wt Readings from Last 10 Encounters:   12/06/17 105.4 kg (232 lb 5.8 oz)   09/25/17 102.1 kg (225 lb)   08/31/17 106.6 kg (235 lb)   06/30/17 115.2 kg (254 lb)   06/27/17 111.6 kg (246 lb)   05/30/17 111.6 kg (246 lb)   05/24/17 114.3 kg (251 lb 14.4 oz)   10/12/16 103.6 kg (228 lb 5 oz)   09/19/14 96.3 kg (212 lb 6.4 oz)[CM1.2]       LABS:  Labs noted    MALNUTRITION:  Patient does not meet two of the following criteria necessary for diagnosing malnutrition: significant weight loss, reduced intake, subcutaneous fat loss, muscle loss or fluid retention    NUTRITION INTERVENTION:  Nutrition Diagnosis:  No nutrition diagnosis at this time.    Implementation:  Nutrition Education:  Per Provider order if indicated    FOLLOW UP/MONITORING:   Will re-evaluate in 7 - 10 days, or sooner, if re-consulted.    Ya Denny RD  Pager [phone] (M-F)            [phone] (W/E & Hol)[CM1.1]                 Revision History        User Key Date/Time User Provider Type Action    > CM1.2 12/7/2017 11:53 AM Ya Denny RD, LD Registered Dietitian Sign     CM1.1 12/7/2017 11:41 AM Ya Denny RD, DEYSI Registered Dietitian             Progress Notes by Davie Phillips MD at 12/7/2017 10:37 AM     Author:  Alan, " Davie Berry MD Service:  Hospitalist Author Type:  Physician    Filed:  12/7/2017 10:45 AM Date of Service:  12/7/2017 10:37 AM Creation Time:  12/7/2017 10:37 AM    Status:  Signed :  Davie Osborn MD (Physician)         [facility]    Hospitalist Progress Note    Assessment & Plan   Antonio Ramirez is a 72 year old male who was admitted on 11/29/2017 for left BKA abscess and found to have MSSA and has wound vac on BKA:    Impression:   Principal Problem:    Abscess of Left BKA -- S/P I&D 12/1/17, MSSA  Active Problems:    Alcoholic cirrhosis of liver (H)    Chronic kidney disease, stage III (moderate)    CAD, MI in 2007 -- S/P CABG x 3 in 2007    Prostate cancer -- S/P Radiative Seed implants in 2000 (no problems since)    Antiphospholipid Jina Syn, Hypercoag (DVT, PE) -- has IVC filt and Warfarin (currently held)    Thoracic aortic aneurysm without rupture (H)    Chronic congestive heart failure, unspecified congestive heart failure type (H)    Paroxysmal a-fib (H) -- normally on Warfarin (currently held)    Thrombocytopenia -- suspect related to alcohol use, resolved    Anemia -- acute blood loss      Plan:  Continue Antibiotics (Ancef 2 gm IV q8h).  Currently on Heparin 5000 units SQ tid -- will discuss with Dr. Howard about plans for surgery tomorrow (?might revise Left BKA in which case will hold heparin this PM), and if that is the last surgery then start back on Warfarin after that, and potentially Lovenox 1-2 days after surgery if needed.       DVT Prophylaxis: Currently on SQ heparin for DVT prophylaxis    Code Status: Full Code    Disposition: Expected discharge in ?2 days once Left BKA infection stable (infection improving and no problems with bleeding).  Might need TCU for wound care depending on wound care needs at discharge.    Davie Osborn MD  Pager [phone]  Cell Phone [phone]  Text Page (7am to 6pm)    Interval History   Overall feels fine,  bowels moved yesterday.  Wound was clean when Wound vac taken off, no further bleeding reported.    Physical Exam   Temp: 98.1  F (36.7  C) Temp src: Oral BP: 114/68 Pulse: 50 Heart Rate: 50 Resp: 16 SpO2: 97 % O2 Device: None (Room air) Oxygen Delivery: 2 LPM  Vitals:    11/29/17 1629 12/06/17 0600   Weight: 99.8 kg (220 lb) 105.4 kg (232 lb 5.8 oz)     Vital Signs with Ranges  Temp:  [96  F (35.6  C)-98.1  F (36.7  C)] 98.1  F (36.7  C)  Pulse:  [50] 50  Heart Rate:  [49-58] 50  Resp:  [8-16] 16  BP: (108-129)/(63-78) 114/68  SpO2:  [92 %-100 %] 97 %  I/O last 3 completed shifts:  In: 640 [P.O.:240; I.V.:400]  Out: 640 [Urine:640]    Constitutional: Awake, alert, cooperative, no apparent distress  Respiratory: Clear to auscultation bilaterally, no crackles or wheezing  Cardiovascular: Regular rate and rhythm, normal S1 and S2, and 2/6 systolic murmur left sternal border  GI: Normal bowel sounds, soft, non-distended, non-tender  Extrem: left BKA stump with wound vac -- but redness and swelling much improved compared to admission.   Neuro: Ox3, decreased light touch to mid shin with right leg and distal BKA on left leg.     Medications     lactated ringers 75 mL/hr at 12/07/17 0537       heparin  5,000 Units Subcutaneous Q8H     sodium chloride (PF)  3 mL Intracatheter Q8H     acetaminophen  975 mg Oral Q8H     senna-docusate  1-2 tablet Oral BID     lactobacillus rhamnosus (GG)  1 capsule Oral Daily     sodium phosphate  1 enema Rectal Once     docusate sodium  200 mg Oral BID     gabapentin  1,200 mg Oral BID     gabapentin (NEURONTIN) tablet 2,400 mg  2,400 mg Oral Q24H     sodium chloride (PF)  3 mL Intracatheter Q8H     ceFAZolin  2 g Intravenous Q8H     potassium chloride SA  10 mEq Oral Daily     carvedilol (COREG) tablet 3.125 mg  3.125 mg Oral BID w/meals       Data     Recent Labs  Lab 12/07/17  0813 12/06/17  1455 12/05/17  0750 12/04/17  1955  12/04/17  0806 12/04/17  0550  12/03/17  0616  12/01/17  1516  12/01/17  0712   WBC  --   --  7.7  --   --  6.4 5.6  --  6.5  --   --  8.3   HGB 8.6*  --  9.5* 10.2*  < > 8.0* 8.0*  < > 10.7*  < >  --  12.3*   MCV  --   --  91  --   --  95 95  --  96  --   --  94    151 142*  --   --  156 127*  --  121*  --   --  116*   INR  --   --   --   --   --  1.65*  --   --   --   --  1.65* 1.91*   NA  --   --  137  --   --   --  138  --  133  --   --  133   POTASSIUM  --   --  4.2  --   --   --  4.2  --  4.2  --   --  3.7   CHLORIDE  --   --  106  --   --   --  105  --  98  --   --  99   CO2  --   --  23  --   --   --  26  --  29  --   --  26   BUN  --   --  15  --   --   --  18  --  23  --   --  26   CR  --   --  1.08  --   --   --  1.43*  --  1.58*  < >  --  1.43*   ANIONGAP  --   --  8  --   --   --  7  --  6  --   --  8   BENNIE  --   --  8.2*  --   --   --  7.5*  --  8.5  --   --  8.3*   GLC  --   --  121*  --   --   --  104*  --  90  < >  --  93   < > = values in this interval not displayed.    Imaging:   No results found for this or any previous visit (from the past 24 hour(s)).[JM1.1]      Revision History        User Key Date/Time User Provider Type Action    > JM1.1 12/7/2017 10:45 AM Davie Phillips MD Physician Sign                  Procedure Notes     No notes of this type exist for this encounter.      Progress Notes - Therapies (Notes from 12/07/17 through 12/10/17)     No notes of this type exist for this encounter.

## 2017-11-29 NOTE — ED PROVIDER NOTES
History     Chief Complaint:  Wound Check      HPI   Antonio Ramirez is a 72 year old male on Coumadin with a history of pulmonary embolism and left lower leg amputation 4 years ago secondary to gangrene infection and diabetic neuropathy. He presents to the emergency department for evaluation of his amputation wound. The patient reports that his stump started to swell and he was not able to fit it into his prosthesis. He originally was not concerned because the stump occasionally swells temporarily and resolved on its own. However this time the swelling, redness, and heat of the area progressed and was accompanied by intermittent fever up to 102. The patient 's wife also reports noticing an open wound on the crease of his surgical closure that has developed recently.     Allergies:  Hmg-Coa-R Inhibitors  Ciprofloxacin     Medications:    Repatha  Potassium chloride  Warfarin  Demadex  Aspirin  Neurontin  Carvedilol    Past Medical History:    Alcoholism    Amputated left leg   Anemia   Anticardiolipin antibody syndrome    Antiplatelet or antithrombotic long-term use   Aortic stenosis   Arthritis   Balance problem   Cardiomyopathy    Cardiomyopathy    Coagulation disorder    Coronary artery disease   Gastro-oesophageal reflux disease   Heart attack   Heart murmur   Hiatal hernia   Hypercholesterolemia   Hypertension   Left foot drop   Liver disease   Low grade B cell lymphoproliferative disorder   Moderate mitral regurgitation   Monoclonal gammopathy   Neuropathy   Neuropathy   Noninfectious ileitis   PE (pulmonary embolism)   Phlebitis and thrombophlebitis   Prostate cancer    Renal disease   Syncope   Thoracic aneurysm   Thoracic aortic aneurysm, without rupture    Thrombocytopenia    Thrombosis of leg   Urethral stricture     Past Surgical History:    Left leg amputation  Appendectomy  Knee arthroplasty  Cardiac surgery  Cholecystectomy  Colonoscopy  Cytoscopy, dilate urethra, combined  Endoscopic stripping  "vein  Eye surgery  Genitourinary surgery  Umbilical hernia repair  IVC filter replacement  Knee scope  Total hip replacement  Elbow surgery    Family History:    Family history reviewed. No pertinent family history.    Social History:  The patient was accompanied to the emergency department by his wife.  Smoking Status: Never Smoker  Smokeless Tobacco: Unknown  Alcohol Use: Yes  Marital Status:      Review of Systems   Constitutional: Positive for fever.   Skin: Positive for color change (redness left leg) and wound (left leg amputation).   All other systems reviewed and are negative.    Physical Exam     Patient Vitals for the past 24 hrs:   BP Temp Temp src Pulse Resp SpO2 Height Weight   11/29/17 1938 134/61 98.8  F (37.1  C) Oral 67 16 95 % - -   11/29/17 1629 - - - - - - 1.854 m (6' 1\") 99.8 kg (220 lb)   11/29/17 1625 120/76 98.3  F (36.8  C) Oral 69 18 97 % - -       Physical Exam  General: Patient is alert and normal appearing.  HEENT: Head atraumatic    Eyes: pupils equal and reactive.   Nares: patent   Oropharynx: no lesions, uvula midline, no palatal draping, normal voice, no trismus  Neck: Supple without lymphadenopathy, no meningismus  Chest: Heart regular rate and rhythm.   Lungs: Equal clear to auscultation with no wheeze or rales  Abdomen: Soft, non tender, nondistended, normal bowel sounds  Back: No costovertebral angle tenderness, no midline C, T or L spine tenderness  Neuro: Grossly nonfocal, normal speech, strength equal bilaterally, CN 2-12 intact.  BKA on left  Extremities: No deformities, equal radial and DP pulses. No clubbing, cyanosis.  No edema  Skin: Warm and dry.. Red, warm, soft fluctant swelling to BKA stump.  Small 2mm wound with purulent drainage      Emergency Department Course     ECG:  ECG taken at 1910, ECG read at 1918  Atrial fibrillation  Abdominal ECG  When compard to ECG of 02/08/2000 0816  Atrial fibrillation has replaced sinus rhythm  Nonspecific T wave abnormality " now evident in anterior leads  Rate 67 bpm. MT interval * ms. QRS duration 80 ms. QT/QTc 436/460 ms. P-R-T axes * 69 65.    Imaging:  Radiology findings were communicated with the patient who voiced understanding of the findings.    US Extremity Non Vascular Left  1. Edematous hyperemic left lower extremity stump tissues.  2. Complex fluid collection as described. Abscess cannot be excluded.  Reading per radiology.    Laboratory:  Laboratory findings were communicated with the patient who voiced understanding of the findings.    INR: 2.53 (H)  Blood culture: Pending  Blood culture preliminary: No growth after 2 hours  CBC: WBC 11.0, HGB 14.0,  (L)  BMP:  (L), Glucose 105 (H), GFR Estimate 57 (L), Calcium 8.4 (L) o/w WNL (Creatinine 1.23)    Interventions:  1836 Zosyn 3.375 gm IV  1839 Vancomycin 200 mL/hr IV  1859 Vancomycin 200 mL/hr IV    Emergency Department Course:    Nursing notes and vitals reviewed.    I performed an exam of the patient as documented above.     1737 I spoke with Dr. Tapia of orthopedics regarding the patient's presentation, findings, and plan of care.     1745 I discussed the treatment plan with the patient. They expressed understanding of this plan and consented to admission. I discussed the patient with Dr. Phillips, who will admit the patient to a monitored bed for further evaluation and treatment.    I personally reviewed the laboratory, imaging, and EKG results with the patient and answered all related questions prior to admission.    Impression & Plan      Medical Decision Making:  Antonio Ramirez is a 72 year old male who presents to the emergency department with complaints of swelling, erythema, and warmth to his BKA site. He has fluctuant swelling of that area. There is a small wound with some mild purulence noted at the end of it. It does not probe at this time. He is hemodynamically stable here, and no signs of septic shock. His white blood cell count is 11, platelets  are mildly low at 125, likely related to his chronic alcoholic hepatitis. Sodium mildly low at 131. INR is still pending at this time. Ultrasound shows a 6 by 3 cm complex fluid collection that could be abscess and evidence of cellulitis overlying the stump.  I feel this is the source of his intermittent fever.  The patient is very medically complex with multiple commorbidities and will need close monitoring in the hospital for this infection.  INR is therapeutic and I doubt DVT in the more proximal leg. I have ordered vancomycin and Zosyn. Blood cultures are pending. We will admit. Discussed with Dr. Tapia who will consult on the patient for surgical drainage and Dr. Phillips who agreed to accept the patient for admission.    Diagnosis:    ICD-10-CM    1. Cellulitis and abscess of left lower extremity L03.116 CBC with platelets differential    L02.416 Blood culture     Blood culture     INR     Disposition:   The patient was admitted into the care of Dr. Phillips for further evaluation and management.    Scribe Disclosure:  I, Kiki Rosales, am serving as a scribe at 4:32 PM on 11/29/2017 to document services personally performed by Giselle Conrad MD, based on my observations and the provider's statements to me.     EMERGENCY DEPARTMENT       Giselle Conrad MD  11/29/17 2052

## 2017-11-29 NOTE — IP AVS SNAPSHOT
"[place] [address] [facility]: [phone]                                              INTERAGENCY TRANSFER FORM - PHYSICIAN ORDERS   2017                    Hospital Admission Date: 2017  GALILEA LUGO   : 1945  Sex: Male        Attending Provider: Saima Howard MD     Allergies:  Hmg-coa-r Inhibitors, Ciprofloxacin    Infection:  None   Service:  HOSPITALIST    Ht:  1.854 m (6' 1\")   Wt:  105.8 kg (233 lb 4 oz)   Admission Wt:  99.8 kg (220 lb)    BMI:  30.77 kg/m 2   BSA:  2.33 m 2            Patient PCP Information     Provider PCP Type    Main Hardy MD General      ED Clinical Impression     Diagnosis Description Comment Added By Time Added    Cellulitis and abscess of left lower extremity [L03.116, L02.416] Cellulitis and abscess of left lower extremity [L03.116, L02.416]  Giselle Conrad MD 2017  5:48 PM      Hospital Problems as of 12/10/2017              Priority Class Noted POA    Alcoholic cirrhosis of liver (H) Medium  2014 Yes    Chronic kidney disease, stage III (moderate) Medium  2014 Yes    CAD, MI in  -- S/P CABG x 3 in  Medium  2017 Yes    Prostate cancer -- S/P Radiative Seed implants in  (no problems since) Medium  2017 Yes    Antiphospholipid Jina Syn, Hypercoag (DVT, PE) -- has IVC filt and Warfarin Medium  2017 Yes    Thoracic aortic aneurysm without rupture (H) Medium  2017 Yes    Chronic congestive heart failure, unspecified congestive heart failure type (H) Medium  2017 Yes    * (Principal)Abscess of Left BKA -- S/P I&D 17, MSSA Medium  2017 Unknown    Paroxysmal a-fib (H) Medium  2017 Unknown    Cellulitis and abscess of leg Medium  2017 Yes    Thrombocytopenia -- suspect related to alcohol use Medium  2017 Unknown    Abscess Medium  2017 Yes    Infection of left below knee amputation (H) Medium  2017 Yes      Non-Hospital Problems as of " 12/10/2017              Priority Class Noted    Right knee DJD Medium  9/19/2014    Alcohol abuse Medium  9/24/2014    Physical deconditioning Medium  9/24/2014    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 (H) Medium  5/24/2017      Code Status History     Date Active Date Inactive Code Status Order ID Comments User Context    12/6/2017  1:59 PM 12/8/2017  9:04 PM Full Code [id number]  Saima Howard MD Inpatient    12/4/2017 10:29 AM 12/6/2017  1:59 PM Full Code [id number]  Von Gomez MD Inpatient    12/4/2017 10:28 AM 12/4/2017 10:29 AM Full Code [id number]  Lejeune, Stacey, MD Inpatient    12/3/2017  3:04 PM 12/4/2017 10:28 AM Full Code [id number]  Omaira Coleman DO Inpatient    12/1/2017  7:52 PM 12/3/2017  3:04 PM Special Code [id number] Parameters:  Nursing to determine Saima Howard MD Inpatient    11/29/2017  7:56 PM 12/1/2017  7:52 PM Full Code [id number]  Davie Phillips MD Inpatient    9/21/2014  5:31 PM 11/29/2017  7:56 PM Full Code [id number]  Davie Gonzales PA-C Outpatient    9/19/2014  1:39 PM 9/21/2014  5:31 PM Full Code [id number]  Cecelia Garcia MD Inpatient         Medication Review      START taking        Dose / Directions Comments    * acetaminophen 325 MG tablet   Commonly known as:  TYLENOL   Used for:  Cellulitis and abscess of left lower extremity        Dose:  975 mg   Take 3 tablets (975 mg) by mouth every 8 hours   Quantity:  100 tablet   Refills:  0        * acetaminophen 325 MG tablet   Commonly known as:  TYLENOL   Used for:  Cellulitis and abscess of left lower extremity        Dose:  650 mg   Start taking on:  12/11/2017   Take 2 tablets (650 mg) by mouth every 4 hours as needed for other (surgical pain)   Quantity:  100 tablet   Refills:  0        amoxicillin-clavulanate 875-125 MG per tablet   Commonly known as:  AUGMENTIN   Used for:  Cellulitis and abscess of left lower extremity        Dose:  1 tablet   Take 1 tablet by mouth 2 times  daily   Quantity:  20 tablet   Refills:  0        docusate sodium 100 MG capsule   Commonly known as:  COLACE   Used for:  Cellulitis and abscess of left lower extremity        Dose:  200 mg   Take 2 capsules (200 mg) by mouth 2 times daily   Quantity:  60 capsule   Refills:  0        lactobacillus rhamnosus (GG) capsule   Used for:  Cellulitis and abscess of left lower extremity        Dose:  1 capsule   Take 1 capsule by mouth daily   Refills:  0        oxyCODONE IR 5 MG tablet   Commonly known as:  ROXICODONE   Used for:  Cellulitis and abscess of left lower extremity        Dose:  5-10 mg   Take 1-2 tablets (5-10 mg) by mouth every 4 hours as needed for moderate to severe pain   Quantity:  18 tablet   Refills:  0        polyethylene glycol Packet   Commonly known as:  MIRALAX/GLYCOLAX   Used for:  Cellulitis and abscess of left lower extremity        Dose:  17 g   Take 17 g by mouth 2 times daily as needed for constipation (constipation)   Quantity:  7 packet   Refills:  0        QUEtiapine 25 MG tablet   Commonly known as:  SEROquel   Used for:  Cellulitis and abscess of left lower extremity        Dose:  12.5 mg   Take 0.5 tablets (12.5 mg) by mouth every 6 hours as needed (agitation, delirium)   Quantity:  60 tablet   Refills:  0        * Notice:  This list has 2 medication(s) that are the same as other medications prescribed for you. Read the directions carefully, and ask your doctor or other care provider to review them with you.      CONTINUE these medications which may have CHANGED, or have new prescriptions. If we are uncertain of the size of tablets/capsules you have at home, strength may be listed as something that might have changed.        Dose / Directions Comments    warfarin 1 MG tablet   Commonly known as:  COUMADIN   This may have changed:  additional instructions   Used for:  Antiphospholipid antibody syndrome (H)        Dose:  5 mg   Take 5 tablets (5 mg) by mouth daily Check INR 12/11 for  further dosing.   Quantity:  30 tablet   Refills:  0          CONTINUE these medications which have NOT CHANGED        Dose / Directions Comments    ASPIRIN EC PO        Dose:  81 mg   Take 81 mg by mouth daily   Refills:  0        CARVEDILOL PO        Dose:  3.125 mg   Take 3.125 mg by mouth 2 times daily (with meals)   Refills:  0        evolocumab 140 MG/ML prefilled syringe   Commonly known as:  REPATHA        Inject 1 pen subcutaneously every 2 weeks. For sample use only   Refills:  0        NEURONTIN PO        Dose:  600-1200 mg   Take 600-1,200 mg by mouth 3 times daily 600 mg in morning, 600 mg midday & 1200 mg at HS   Refills:  0        order for DME   Used for:  Pressure ulcer of left calf, stage 3 (H)        [facility] Phone [phone] Fax [phone]  Primary Dressing Woun'Dres Gel   Qty DO NOT DISPENSE Secondary Dressing Mepilex 4x4 Qty 60 (for two wounds) Length of Need: 1 month Frequency of dressing change: daily   Quantity:  30 days   Refills:  0        potassium chloride SA 10 MEQ CR tablet   Commonly known as:  K-DUR/KLOR-CON M        Dose:  10 mEq   Take 10 mEq by mouth daily   Refills:  0        torsemide 20 MG tablet   Commonly known as:  DEMADEX        Dose:  20 mg   Take 20 mg by mouth 2 times daily   Refills:  0                  Further instructions from your care team       You are being discharged to a transitional care unit:  [facility]  Phone:  [phone]  Fax:  [phone]      Summary of Visit     Reason for your hospital stay       Knee abscess             After Care     Activity - Up with assistive device           Activity - Up with nursing assistance           Advance Diet as Tolerated       Follow this diet upon discharge: Orders Placed This Encounter      Advance Diet as Tolerated: Regular Diet Adult       Fall precautions           General info for SNF       Length of Stay Estimate: Short Term Care: Estimated # of Days 31-90  Condition at Discharge: Improving  Level of  care:skilled   Rehabilitation Potential: Fair  Admission H&P remains valid and up-to-date: Yes  Recent Chemotherapy: N/A  Use Nursing Home Standing Orders: Yes             Referrals     Occupational Therapy Adult Consult       Evaluate and treat as clinically indicated.    Reason: post surgery       Physical Therapy Adult Consult       Evaluate and treat as clinically indicated.    Reason: post surgery             Follow-Up Appointment Instructions     Future Labs/Procedures    Follow Up and recommended labs and tests     Comments:    Follow up with [facility] physician.  The following labs/tests are recommended: INR  And BMP on monday.  F/u with ortho clinic next Monday for dressing changes.      Follow-Up Appointment Instructions     Follow Up and recommended labs and tests       Follow up with [facility] physician.  The following labs/tests are recommended: INR  And BMP on monday.  F/u with ortho clinic next Monday for dressing changes.             Statement of Approval     Ordered          12/10/17 0955  I have reviewed and agree with all the recommendations and orders detailed in this document.  EFFECTIVE NOW     Approved and electronically signed by:  Rojelio Flores MD           12/09/17 1138  I have reviewed and agree with all the recommendations and orders detailed in this document.  EFFECTIVE NOW     Approved and electronically signed by:  Parth Farias MD

## 2017-11-29 NOTE — IP AVS SNAPSHOT
MRN:[id number]                      After Visit Summary   11/29/2017    Antonio Ramirez    MRN: [id number]           Thank you!     Thank you for choosing [facility] for your care. Our goal is always to provide you with excellent care. Hearing back from our patients is one way we can continue to improve our services. Please take a few minutes to complete the written survey that you may receive in the mail after you visit with us. Thank you!        Patient Information     Date Of Birth          1945        Designated Caregiver       Most Recent Value    Caregiver    Will someone help with your care after discharge? yes    Name of designated caregiver Helena    Phone number of caregiver [phone]    Caregiver address [address] [place] [address] [phone]      About your hospital stay     You were admitted on:  November 29, 2017 You last received care in the:  [facility] [facility]    You were discharged on:  December 10, 2017        Reason for your hospital stay       Knee abscess                  Who to Call     For medical emergencies, please call 911.  For non-urgent questions about your medical care, please call your primary care provider or clinic, [phone]  For questions related to your surgery, please call your surgery clinic        Attending Provider     Provider Specialty    Giselle Conrad MD Emergency Medicine    [facility], Davie Berry MD Internal Medicine    Saima Howard MD Orthopedics       Primary Care Provider Office Phone # Fax #    [facility] [phone]      After Care Instructions     Activity - Up with assistive device           Activity - Up with nursing assistance           Advance Diet as Tolerated       Follow this diet upon discharge: Orders Placed This Encounter      Advance Diet as Tolerated: Regular Diet Adult            Fall precautions           General info for SNF       Length of Stay Estimate: Short Term Care: Estimated #  of Days 31-90  Condition at Discharge: Improving  Level of care:skilled   Rehabilitation Potential: Fair  Admission H&P remains valid and up-to-date: Yes  Recent Chemotherapy: N/A  Use Nursing Home Standing Orders: Yes                  Follow-up Appointments     Follow Up and recommended labs and tests       Follow up with correction physician.  The following labs/tests are recommended: INR  And BMP on monday.  F/u with ortho clinic next Monday for dressing changes.                  Additional Services     Occupational Therapy Adult Consult       Evaluate and treat as clinically indicated.    Reason: post surgery            Physical Therapy Adult Consult       Evaluate and treat as clinically indicated.    Reason: post surgery                  Further instructions from your care team       You are being discharged to a transitional care unit:  [facility]  Phone:  [phone]  Fax:  [phone]      Warfarin Instruction     You have started taking a medicine called warfarin. This is a blood-thinning medicine (anticoagulant). It helps prevent and treat blood clots.      Before leaving the hospital, make sure you know how much to take and how long to take it.      You will need regular blood tests to make sure your blood is clotting safely. It is very important to see your doctor for regular blood tests.    Talk to your doctor before taking any new medicine (this includes over-the-counter drugs and herbal products). Many medicines can interact with warfarin. This may cause more bleeding or too much clotting.     Eating a lot of vitamin K--found in green, leafy vegetables--can change the way warfarin works in your body. Do NOT avoid these foods. Instead, try to eat the same amount each day.     Bleeding is the most common side-effect of warfarin. You may notice bleeding gums, a bloody nose, bruises and bleeding longer when you cut yourself. See a doctor at once if:   o You cough up blood  o You find blood in your stool  "(poop)  o You have a deep cut, or a cut that bleeds longer than 10 minutes   o You have a bad cut, hard fall, accident or hit your head (go to urgent care or the emergency room).    For women who can get pregnant: This medicine can harm an unborn baby. Be very careful not to get pregnant while taking this medicine. If you think you might be pregnant, call your doctor right away.    For more information, read \"Guide to Warfarin Therapy,  the booklet you received in the hospital.        Pending Results     Date and Time Order Name Status Description    12/6/2017 1159 Anaerobic bacterial culture Preliminary     12/4/2017 0802 Fungus Culture, non-blood Preliminary     12/4/2017 0802 Anaerobic bacterial culture Preliminary             Statement of Approval     Ordered          12/10/17 0964  I have reviewed and agree with all the recommendations and orders detailed in this document.  EFFECTIVE NOW     Approved and electronically signed by:  Rojelio Flores MD           12/09/17 1132  I have reviewed and agree with all the recommendations and orders detailed in this document.  EFFECTIVE NOW     Approved and electronically signed by:  Parth Farias MD             Admission Information     Date & Time Provider Department Dept. Phone    11/29/2017 Saima Howard MD [facility] [address] [facility] [phone]      Your Vitals Were     Blood Pressure Pulse Temperature Respirations Height Weight    110/60 (BP Location: Right arm) 64 98.3  F (36.8  C) (Oral) 16 1.854 m (6' 1\") 105.8 kg (233 lb 4 oz)    Pulse Oximetry BMI (Body Mass Index)                96% 30.77 kg/m2          [facility] Information     [facility] gives you secure access to your electronic health record. If you see a primary care provider, you can also send messages to your care team and make appointments. If you have questions, please call your primary care clinic.  If you do not have a primary care provider, please call [phone] and they " will assist you.        Care EveryWhere ID     This is your Care EveryWhere ID. This could be used by other organizations to access your [facility] medical records  [id number]        Equal Access to Services     ANTHONY DORANTES : Evelin Cabrera, waraymondda keithadaha, qamanjeetta kageeda laverne, igor hananein hayaajigna montemayorbreonna ross paul mix. So [facility] [phone].    ATENCIÓN: Si habla español, tiene a shrestha disposición servicios gratuitos de asistencia lingüística. Llame al [phone].    We comply with applicable federal civil rights laws and [place] laws. We do not discriminate on the basis of race, color, national origin, age, disability, sex, sexual orientation, or gender identity.               Review of your medicines      START taking        Dose / Directions    * acetaminophen 325 MG tablet   Commonly known as:  TYLENOL   Used for:  Cellulitis and abscess of left lower extremity        Dose:  975 mg   Take 3 tablets (975 mg) by mouth every 8 hours   Quantity:  100 tablet   Refills:  0       * acetaminophen 325 MG tablet   Commonly known as:  TYLENOL   Used for:  Cellulitis and abscess of left lower extremity        Dose:  650 mg   Start taking on:  12/11/2017   Take 2 tablets (650 mg) by mouth every 4 hours as needed for other (surgical pain)   Quantity:  100 tablet   Refills:  0       amoxicillin-clavulanate 875-125 MG per tablet   Commonly known as:  AUGMENTIN   Used for:  Cellulitis and abscess of left lower extremity        Dose:  1 tablet   Take 1 tablet by mouth 2 times daily   Quantity:  20 tablet   Refills:  0       docusate sodium 100 MG capsule   Commonly known as:  COLACE   Used for:  Cellulitis and abscess of left lower extremity        Dose:  200 mg   Take 2 capsules (200 mg) by mouth 2 times daily   Quantity:  60 capsule   Refills:  0       lactobacillus rhamnosus (GG) capsule   Used for:  Cellulitis and abscess of left lower extremity        Dose:  1 capsule   Take 1 capsule by mouth daily    Refills:  0       oxyCODONE IR 5 MG tablet   Commonly known as:  ROXICODONE   Used for:  Cellulitis and abscess of left lower extremity        Dose:  5-10 mg   Take 1-2 tablets (5-10 mg) by mouth every 4 hours as needed for moderate to severe pain   Quantity:  18 tablet   Refills:  0       polyethylene glycol Packet   Commonly known as:  MIRALAX/GLYCOLAX   Used for:  Cellulitis and abscess of left lower extremity        Dose:  17 g   Take 17 g by mouth 2 times daily as needed for constipation (constipation)   Quantity:  7 packet   Refills:  0       QUEtiapine 25 MG tablet   Commonly known as:  SEROquel   Used for:  Cellulitis and abscess of left lower extremity        Dose:  12.5 mg   Take 0.5 tablets (12.5 mg) by mouth every 6 hours as needed (agitation, delirium)   Quantity:  60 tablet   Refills:  0       * Notice:  This list has 2 medication(s) that are the same as other medications prescribed for you. Read the directions carefully, and ask your doctor or other care provider to review them with you.      CONTINUE these medicines which may have CHANGED, or have new prescriptions. If we are uncertain of the size of tablets/capsules you have at home, strength may be listed as something that might have changed.        Dose / Directions    warfarin 1 MG tablet   Commonly known as:  COUMADIN   This may have changed:  additional instructions   Used for:  Antiphospholipid antibody syndrome (H)        Dose:  5 mg   Take 5 tablets (5 mg) by mouth daily Check INR 12/11 for further dosing.   Quantity:  30 tablet   Refills:  0         CONTINUE these medicines which have NOT CHANGED        Dose / Directions    ASPIRIN EC PO        Dose:  81 mg   Take 81 mg by mouth daily   Refills:  0       CARVEDILOL PO        Dose:  3.125 mg   Take 3.125 mg by mouth 2 times daily (with meals)   Refills:  0       evolocumab 140 MG/ML prefilled syringe   Commonly known as:  REPATHA        Inject 1 pen subcutaneously every 2 weeks. For sample  use only   Refills:  0       NEURONTIN PO        Dose:  600-1200 mg   Take 600-1,200 mg by mouth 3 times daily 600 mg in morning, 600 mg midday & 1200 mg at HS   Refills:  0       order for DME   Used for:  Pressure ulcer of left calf, stage 3 (H)        [facility] Phone [phone] Fax [phone]  Primary Dressing Woun'Dres Gel   Qty DO NOT DISPENSE Secondary Dressing Mepilex 4x4 Qty 60 (for two wounds) Length of Need: 1 month Frequency of dressing change: daily   Quantity:  30 days   Refills:  0       potassium chloride SA 10 MEQ CR tablet   Commonly known as:  K-DUR/KLOR-CON M        Dose:  10 mEq   Take 10 mEq by mouth daily   Refills:  0       torsemide 20 MG tablet   Commonly known as:  DEMADEX        Dose:  20 mg   Take 20 mg by mouth 2 times daily   Refills:  0            Where to get your medicines      These medications were sent to [facility]/pharmacy #[id number] - [place], [place] - [address]  [address] [address]     Phone:  [phone]     amoxicillin-clavulanate 875-125 MG per tablet         Some of these will need a paper prescription and others can be bought over the counter. Ask your nurse if you have questions.     Bring a paper prescription for each of these medications     oxyCODONE IR 5 MG tablet       You don't need a prescription for these medications     acetaminophen 325 MG tablet    acetaminophen 325 MG tablet    docusate sodium 100 MG capsule    lactobacillus rhamnosus (GG) capsule    polyethylene glycol Packet    QUEtiapine 25 MG tablet    warfarin 1 MG tablet               ANTIBIOTIC INSTRUCTION     You've Been Prescribed an Antibiotic - Now What?  Your healthcare team thinks that you or your loved one might have an infection. Some infections can be treated with antibiotics, which are powerful, life-saving drugs. Like all medications, antibiotics have side effects and should only be used when necessary. There are some important things you should know about your antibiotic  treatment.      Your healthcare team may run tests before you start taking an antibiotic.    Your team may take samples (e.g., from your blood, urine or other areas) to run tests to look for bacteria. These test can be important to determine if you need an antibiotic at all and, if you do, which antibiotic will work best.      Within a few days, your healthcare team might change or even stop your antibiotic.    Your team may start you on an antibiotic while they are working to find out what is making you sick.    Your team might change your antibiotic because test results show that a different antibiotic would be better to treat your infection.    In some cases, once your team has more information, they learn that you do not need an antibiotic at all. They may find out that you don't have an infection, or that the antibiotic you're taking won't work against your infection. For example, an infection caused by a virus can't be treated with antibiotics. Staying on an antibiotic when you don't need it is more likely to be harmful than helpful.      You may experience side effects from your antibiotic.    Like all medications, antibiotics have side effects. Some of these can be serious.    Let you healthcare team know if you have any known allergies when you are admitted to the hospital.    One significant side effect of nearly all antibiotics is the risk of severe and sometimes deadly diarrhea caused by Clostridium difficile (C. Difficile). This occurs when a person takes antibiotics because some good germs are destroyed. Antibiotic use allows C. diificile to take over, putting patients at high risk for this serious infection.    As a patient or caregiver, it is important to understand your or your loved one's antibiotic treatment. It is especially important for caregivers to speak up when patients can't speak for themselves. Here are some important questions to ask your healthcare team.    What infection is this  antibiotic treating and how do you know I have that infection?    What side effects might occur from this antibiotic?    How long will I need to take this antibiotic?    Is it safe to take this antibiotic with other medications or supplements (e.g., vitamins) that I am taking?     Are there any special directions I need to know about taking this antibiotic? For example, should I take it with food?    How will I be monitored to know whether my infection is responding to the antibiotic?    What tests may help to make sure the right antibiotic is prescribed for me?      Information provided by:  www.cdc.gov/getsmart  U.S. Department of Health and Human Services  Centers for disease Control and Prevention  National Center for Emerging and Zoonotic Infectious Diseases  Division of Healthcare Quality Promotion         Protect others around you: Learn how to safely use, store and throw away your medicines at www.disposemymeds.org.             Medication List: This is a list of all your medications and when to take them. Check marks below indicate your daily home schedule. Keep this list as a reference.      Medications           Morning Afternoon Evening Bedtime As Needed    * acetaminophen 325 MG tablet   Commonly known as:  TYLENOL   Take 3 tablets (975 mg) by mouth every 8 hours                                * acetaminophen 325 MG tablet   Commonly known as:  TYLENOL   Take 2 tablets (650 mg) by mouth every 4 hours as needed for other (surgical pain)   Start taking on:  12/11/2017                                amoxicillin-clavulanate 875-125 MG per tablet   Commonly known as:  AUGMENTIN   Take 1 tablet by mouth 2 times daily                                ASPIRIN EC PO   Take 81 mg by mouth daily   Last time this was given:  81 mg on 12/3/2017  9:30 PM                                   CARVEDILOL PO   Take 3.125 mg by mouth 2 times daily (with meals)   Last time this was given:  3.125 mg on 12/10/2017  9:36 AM                                    docusate sodium 100 MG capsule   Commonly known as:  COLACE   Take 2 capsules (200 mg) by mouth 2 times daily   Last time this was given:  100 mg on 12/5/2017  9:17 AM                                   evolocumab 140 MG/ML prefilled syringe   Commonly known as:  REPATHA   Inject 1 pen subcutaneously every 2 weeks. For sample use only                                lactobacillus rhamnosus (GG) capsule   Take 1 capsule by mouth daily   Last time this was given:  1 capsule on 12/10/2017  8:06 AM                                   NEURONTIN PO   Take 600-1,200 mg by mouth 3 times daily 600 mg in morning, 600 mg midday & 1200 mg at HS   Last time this was given:  1,200 mg on 12/10/2017  8:06 AM                                         order for DME   [facility] Phone [phone] Fax [phone]  Primary Dressing Woun'Dres Gel   Qty DO NOT DISPENSE Secondary Dressing Mepilex 4x4 Qty 60 (for two wounds) Length of Need: 1 month Frequency of dressing change: daily                                oxyCODONE IR 5 MG tablet   Commonly known as:  ROXICODONE   Take 1-2 tablets (5-10 mg) by mouth every 4 hours as needed for moderate to severe pain   Last time this was given:  10 mg on 12/10/2017  1:40 AM                                   polyethylene glycol Packet   Commonly known as:  MIRALAX/GLYCOLAX   Take 17 g by mouth 2 times daily as needed for constipation (constipation)                                potassium chloride SA 10 MEQ CR tablet   Commonly known as:  K-DUR/KLOR-CON M   Take 10 mEq by mouth daily   Last time this was given:  10 mEq on 12/10/2017  8:06 AM                                   QUEtiapine 25 MG tablet   Commonly known as:  SEROquel   Take 0.5 tablets (12.5 mg) by mouth every 6 hours as needed (agitation, delirium)                                torsemide 20 MG tablet   Commonly known as:  DEMADEX   Take 20 mg by mouth 2 times daily   Last time this was given:  20 mg on  12/1/2017  8:45 AM                                   warfarin 1 MG tablet   Commonly known as:  COUMADIN   Take 5 tablets (5 mg) by mouth daily Check INR 12/11 for further dosing.   Last time this was given:  5 mg on 12/9/2017 10:13 PM                                * Notice:  This list has 2 medication(s) that are the same as other medications prescribed for you. Read the directions carefully, and ask your doctor or other care provider to review them with you.

## 2017-11-29 NOTE — IP AVS SNAPSHOT
` [place] [id number] [facility]: [phone]            Medication Administration Report for Antonio Ramirez as of 12/10/17 1108   Legend:    Given Hold Not Given Due Canceled Entry Other Actions    Time Time (Time) Time  Time-Action       Inactive    Active    Linked        Medications 12/04/17 12/05/17 12/06/17 12/07/17 12/08/17 12/09/17 12/10/17    acetaminophen (TYLENOL) tablet 975 mg  Dose: 975 mg Freq: EVERY 8 HOURS Route: PO  Start: 12/08/17 2200   End: 12/11/17 2159   Admin Instructions: Do not use if patient has an active opioid/acetaminophen analgesic order for pain  Maximum acetaminophen dose from all sources = 75 mg/kg/day not to exceed 4 grams/day.         (2138)-Not Given        (0531)-Not Given       (1335)-Not Given       (2108)-Not Given        (0558)-Not Given       [ ] 1400       [ ] 2200           carvedilol (COREG) tablet 3.125 mg  Dose: 3.125 mg Freq: 2 TIMES DAILY WITH MEALS Route: PO  Start: 11/29/17 2000   Admin Instructions: Hold if HR <60 and SBP <100     0623-Auto Hold       1017-Unhold       (1314)-Not Given [C]       (1747)-Not Given [C]        0917 (3.125 mg)-Given       1821-Hold        (0834)-Not Given       0955-Auto Hold       1328-Unhold       (1717)-Not Given        0930 (3.125 mg)-Given       1759 (3.125 mg)-Given        (0837)-Not Given       1630-Auto Hold       1650-Unhold       1704-Auto Hold       1800-Automatically Held       2058-Unhold        0907 (3.125 mg)-Given       (1811)-Not Given        0936 (3.125 mg)-Given       [ ] 1800           ceFAZolin (ANCEF) intermittent infusion 2 g in 100 mL dextrose PRE-MIX  Dose: 2 g Freq: EVERY 8 HOURS Route: IV  Indications of Use: ABSCESS  Start: 12/04/17 1115    1257 (2 g)-Given       1955 (2 g)-Given        0303 (2 g)-Given       1108 (2 g)-Given       1818 (2 g)-Given        0426 (2 g)-Given       0955-Auto Hold       1020 (2 g)-Handoff       1112 (2 g)-Given       1200-Automatically Held        1328-Unhold       1949 (2 g)-Given        0348 (2 g)-Given       1304 (2 g)-Given       2117 (2 g)-Given        0414 (2 g)-Given       1304 (2 g)-Given       1630-Auto Hold       1650-Unhold       1704-Auto Hold       2000-Automatically Held       2058-Unhold        0138 (2 g)-Given       0905 (2 g)-Given       1810 (2 g)-Given        0138 (2 g)-Given       (0939)-Not Given [C]       [ ] 1800           docusate sodium (COLACE) capsule 200 mg  Dose: 200 mg Freq: 2 TIMES DAILY Route: PO  Start: 12/05/17 1515     (1645)-Not Given       (2017)-Not Given        (0834)-Not Given       0955-Auto Hold       1328-Unhold       (2018)-Not Given        (0933)-Not Given       (2132)-Not Given        (1056)-Not Given       1630-Auto Hold       1650-Unhold       1704-Auto Hold       2058-Unhold       (2138)-Not Given        (0908)-Not Given       (2107)-Not Given        (0810)-Not Given       [ ] 2100           gabapentin (NEURONTIN) capsule 1,200 mg  Dose: 1,200 mg Freq: 2 TIMES DAILY Route: PO  Start: 12/06/17 0900   Admin Instructions: Home dose verified with [facility]       0833 (1,200 mg)-Given       0955-Auto Hold       1300-Automatically Held       1328-Unhold        0929 (1,200 mg)-Given       1304 (1,200 mg)-Given        0837 (1,200 mg)-Given       (1257)-Not Given       1630-Auto Hold       1650-Unhold       1704-Auto Hold       2058-Unhold        0906 (600 mg)-Given [C]       1224 (1,200 mg)-Given        0806 (1,200 mg)-Given       [ ] 1300           gabapentin (NEURONTIN) tablet 2,400 mg  Dose: 2,400 mg Freq: EVERY 24 HOURS Route: PO  Start: 12/06/17 1800   Admin Instructions: Home dose verified with [facility]       0955-Auto Hold       1328-Unhold       1717 (2,400 mg)-Given        1759 (2,400 mg)-Given        1630-Auto Hold       1650-Unhold       1704-Auto Hold       1800-Automatically Held       2058-Unhold        0223 (1,200 mg)-Given [C]       1810 (1,200 mg)-Given [C]        [ ] 1800           gabapentin  (NEURONTIN) tablet 600 mg  Dose: 600 mg Freq: AT BEDTIME PRN Route: PO  PRN Comment: patient takes a dose of gabapentin at night daily as he needs to  Start: 12/05/17 1514     1817 (600 mg)-Given        0955-Auto Hold       1328-Unhold         1630-Auto Hold       1650-Unhold       1704-Auto Hold       2058-Unhold             heparin sodium PF injection 5,000 Units  Dose: 5,000 Units Freq: EVERY 8 HOURS Route: SC  Start: 12/09/17 1330   Admin Instructions: Check to make sure start date/time is 12-24 hours post op unless documented complication.   Continue until discharge to home.   HOLD if platelet count falls below 50% of baseline or less than 100,000/ L and notify provider.    Restart heparin drip according to medical team/pharmacy instructions.          1335 (5,000 Units)-Given       2213 (5,000 Units)-Given        0558 (5,000 Units)-Given       [ ] 1330       [ ] 2130           HYDROmorphone (PF) (DILAUDID) injection 0.3-0.5 mg  Dose: 0.3-0.5 mg Freq: EVERY 2 HOURS PRN Route: IV  PRN Reason: severe pain  PRN Comment: or if patient unable to take PO  Start: 12/08/17 2103   Admin Instructions: Hold while on PCA.  For ordered doses up to 4 mg give IV Push undiluted. Administer each 2mg over 2-5 minutes.         2133 (0.3 mg)-Given             hydrOXYzine (ATARAX) tablet 10 mg  Dose: 10 mg Freq: EVERY 6 HOURS PRN Route: PO  PRN Reason: itching  Start: 12/06/17 1359   Admin Instructions: Caution to be used when administering multiple CNS depressing meds within a short time frame.         1630-Auto Hold       1650-Unhold       1704-Auto Hold       2058-Unhold             lactated ringers infusion  Rate: 75 mL/hr Freq: CONTINUOUS Route: IV  Start: 12/08/17 2115   Admin Instructions: Change to saline lock when PO well tolerated.         2139 ( )-New Bag             lactobacillus rhamnosus (GG) (CULTURELL) capsule 1 capsule  Dose: 1 capsule Freq: DAILY Route: PO  Start: 12/05/17 1100   Admin Instructions: Administer at  "least 2 hours before or after oral antibiotics. Capsules may be opened.      1422 (1 capsule)-Given        0834 (1 capsule)-Given       0955-Auto Hold       1328-Unhold        0929 (1 capsule)-Given        (1540)-Not Given       1630-Auto Hold       1650-Unhold       1704-Auto Hold       2058-Unhold        0907 (1 capsule)-Given        0806 (1 capsule)-Given           lidocaine (LMX4) cream  Freq: EVERY 1 HOUR PRN Route: Top  PRN Reason: pain  PRN Comment: with VAD insertion or accessing implanted port.  Start: 12/08/17 2103   Admin Instructions: Do NOT give if patient has a history of allergy to any local anesthetic or any \"bethany\" product.   Apply 30 minutes prior to VAD insertion or port access.  MAX Dose:  2.5 g (  of 5 g tube)               lidocaine 1 % 1 mL  Dose: 1 mL Freq: EVERY 1 HOUR PRN Route: OTHER  PRN Comment: mild pain with VAD insertion or accessing implanted port  Start: 12/08/17 2103   Admin Instructions: Do NOT give if patient has a history of allergy to any local anesthetic or any \"bethany\" product. MAX dose 1 mL subcutaneous OR intradermal in divided doses.               LORazepam (ATIVAN) tablet 0.5-1 mg  Dose: 0.5-1 mg Freq: 3 TIMES DAILY PRN Route: PO  PRN Reason: anxiety  Start: 11/29/17 1856    0517 (0.5 mg)-Given       0623-Auto Hold       1017-Unhold         0955-Auto Hold       1328-Unhold         1630-Auto Hold       1650-Unhold       1704-Auto Hold       2058-Unhold             melatonin tablet 1 mg  Dose: 1 mg Freq: AT BEDTIME PRN Route: PO  PRN Reason: sleep  Start: 12/02/17 2123    0623-Auto Hold       1017-Unhold       2120 (1 mg)-Given         0955-Auto Hold       1328-Unhold       2233 (1 mg)-Given         0155 (1 mg)-Given       1630-Auto Hold       1650-Unhold       1704-Auto Hold       2058-Unhold             naloxone (NARCAN) injection 0.1-0.4 mg  Dose: 0.1-0.4 mg Freq: EVERY 2 MIN PRN Route: IV  PRN Reason: opioid reversal  Start: 12/08/17 2103   Admin Instructions: For " respiratory rate LESS than or EQUAL to 8.  Partial reversal dose:  0.1 mg titrated q 2 minutes for Analgesia Side Effects Monitoring Sedation Level of 3 (frequently drowsy, arousable, drifts to sleep during conversation).Full reversal dose:  0.4 mg bolus for Analgesia Side Effects Monitoring Sedation Level of 4 (somnolent, minimal or no response to stimulation).  For ordered doses up to 2mg give IVP. Give each 0.4mg over 15 seconds in emergency situations. For non-emergent situations further dilute in 9mL of NS to facilitate titration of response.               OLANZapine zydis (zyPREXA) ODT tab 5 mg  Dose: 5 mg Freq: EVERY 6 HOURS PRN Route: PO  PRN Reason: agitation  PRN Comment: delirium  Start: 12/04/17 1648   Admin Instructions: Combined IM and PO doses may significantly increase the risk of orthostatic hypotension at 30 mg per day or higher.  With dry hands, peel back foil backing and gently remove tablet; do not push oral disintegrating tablet through foil backing; administer immediately on tongue and oral disintegrating tablet dissolves in seconds; then swallow with saliva; liquid not required.       0955-Auto Hold       1328-Unhold         1630-Auto Hold       1650-Unhold       1704-Auto Hold       2058-Unhold             ondansetron (ZOFRAN-ODT) ODT tab 4 mg  Dose: 4 mg Freq: EVERY 6 HOURS PRN Route: PO  PRN Reasons: nausea,vomiting  Start: 12/06/17 1359   Admin Instructions: This is Step 1 of nausea and vomiting management.  If nausea not resolved in 15 minutes, go to Step 2 prochlorperazine (COMPAZINE). Do not push through foil backing. Peel back foil and gently remove. Place on tongue immediately. Administration with liquid unnecessary         1630-Auto Hold       1650-Unhold       1704-Auto Hold              2058-Unhold            Or  ondansetron (ZOFRAN) injection 4 mg  Dose: 4 mg Freq: EVERY 6 HOURS PRN Route: IV  PRN Reasons: nausea,vomiting  Start: 12/06/17 1359   Admin Instructions: This is Step 1  of nausea and vomiting management.  If nausea not resolved in 15 minutes, go to Step 2 prochlorperazine (COMPAZINE).  Irritant. For ordered doses up to 4 mg, give IV Push undiluted over 2-5 minutes.         1630-Auto Hold       1650-Unhold       1704-Auto Hold       1811 (4 mg)-Given       2058-Unhold             oxyCODONE IR (ROXICODONE) tablet 5-10 mg  Dose: 5-10 mg Freq: EVERY 4 HOURS PRN Route: PO  PRN Reason: moderate to severe pain  Start: 12/08/17 2103   Admin Instructions: Hold while on PCA or with regular IV opioid dosing.  IF CrCl UNKNOWN start at lowest end of dosing range.          0222 (5 mg)-Given       0908 (10 mg)-Given       1335 (10 mg)-Given       1810 (10 mg)-Given        0140 (10 mg)-Given       1047 (10 mg)-Given           polyethylene glycol (MIRALAX/GLYCOLAX) Packet 17 g  Dose: 17 g Freq: 2 TIMES DAILY PRN Route: PO  PRN Reason: constipation  PRN Comment: constipation  Start: 12/07/17 1044   Admin Instructions: 1 Packet = 17 grams. Mixed prescribed dose in 8 ounces of water. Follow with 8 oz. of water.         1630-Auto Hold       1650-Unhold       1704-Auto Hold       2058-Unhold             potassium chloride SA (K-DUR/KLOR-CON M) CR tablet 10 mEq  Dose: 10 mEq Freq: DAILY Route: PO  Start: 11/30/17 0900   Admin Instructions: DO NOT CRUSH.     0623-Auto Hold       0900-Automatically Held       1017-Unhold        0918 (10 mEq)-Given        0834 (10 mEq)-Given       0955-Auto Hold       1328-Unhold        0930 (10 mEq)-Given        (1540)-Not Given       1630-Auto Hold       1650-Unhold       1704-Auto Hold       2058-Unhold        0906 (10 mEq)-Given        0806 (10 mEq)-Given           QUEtiapine (SEROquel) tablet 25 mg  Dose: 25 mg Freq: EVERY 6 HOURS PRN Route: PO  PRN Comment: agitation, delirium  Start: 12/04/17 1648      0955-Auto Hold       1328-Unhold         1630-Auto Hold       1650-Unhold       1704-Auto Hold       2058-Unhold             senna-docusate (SENOKOT-S;PERICOLACE)  8.6-50 MG per tablet 2 tablet  Dose: 2 tablet Freq: 2 TIMES DAILY Route: PO  Start: 12/07/17 2100   Admin Instructions: Start with 1 tablet PO BID, If no bowel movement in 24 hours, increase to 2 tablets PO BID.  Hold for loose stools.        (2132)-Not Given        (1056)-Not Given       1630-Auto Hold       1650-Unhold       1704-Auto Hold       2058-Unhold       (2139)-Not Given        (0908)-Not Given       (2108)-Not Given        (0810)-Not Given       [ ] 2100           sodium chloride (PF) 0.9% PF flush 3 mL  Dose: 3 mL Freq: EVERY 8 HOURS Route: IK  Start: 12/08/17 2200   Admin Instructions: And Q1H PRN, to lock peripheral IV dormant line.         (2139)-Not Given        (0531)-Not Given       1336 (3 mL)-Given       2213 (3 mL)-Given        0558 (3 mL)-Given       [ ] 1400       [ ] 2200           sodium chloride (PF) 0.9% PF flush 3 mL  Dose: 3 mL Freq: EVERY 1 HOUR PRN Route: IK  PRN Reason: line flush  PRN Comment: for peripheral IV flush post IV meds  Start: 12/08/17 2103              sodium phosphate (FLEET ENEMA) 1 enema  Dose: 1 enema Freq: ONCE Route: RE  Start: 12/05/17 1100   Admin Instructions: For children greater than or equal to 12 years      (1423)-Not Given        0955-Auto Hold       1328-Unhold         1630-Auto Hold       1650-Unhold       1704-Auto Hold       2058-Unhold            Future Medications  Medications 12/04/17 12/05/17 12/06/17 12/07/17 12/08/17 12/09/17 12/10/17       acetaminophen (TYLENOL) tablet 650 mg  Dose: 650 mg Freq: EVERY 4 HOURS PRN Route: PO  PRN Reason: other  PRN Comment: surgical pain  Start: 12/11/17 2200   Admin Instructions: May give first dose 4 hours after last scheduled dose of acetaminophen.  Maximum acetaminophen dose from all sources = 75 mg/kg/day not to exceed 4 grams/day.              Completed Medications  Medications 12/04/17 12/05/17 12/06/17 12/07/17 12/08/17 12/09/17 12/10/17         Dose: 2 g Freq: PRE-OP/PRE-PROCEDURE Route: IV  Indications  of Use: PERIOPERATIVE PHARMACOPROPHYLAXIS  Start: 12/08/17 1624   End: 12/08/17 1813   Admin Instructions: Give first dose within 1 hour PRIOR to incision. If patient weight is greater than or equal to 120 kg increase dose to 3 g.         1813 (2 g)-Given               Dose: 5 mg Freq: ONCE Route: PO  Start: 12/09/17 2130   End: 12/09/17 2213         2213 (5 mg)-Given              Dose: 7.5 mg Freq: ONCE Route: PO  Start: 12/09/17 0015   End: 12/09/17 0140         0140 (7.5 mg)-Given           Discontinued Medications  Medications 12/04/17 12/05/17 12/06/17 12/07/17 12/08/17 12/09/17 12/10/17         Dose: 650 mg Freq: EVERY 4 HOURS PRN Route: PO  PRN Reason: other  PRN Comment: surgical pain  Start: 12/09/17 0000   End: 12/08/17 2128   Admin Instructions: May give first dose 4 hours after last scheduled dose of acetaminophen.  Maximum acetaminophen dose from all sources = 75 mg/kg/day not to exceed 4 grams/day.         1630-Auto Hold       1650-Unhold       1704-Auto Hold       2058-Unhold       2128-Med Discontinued           Dose: 650 mg Freq: EVERY 4 HOURS PRN Route: PO  PRN Reason: mild pain  Start: 11/29/17 1955   End: 12/08/17 2128   Admin Instructions: Alternate ibuprofen (if ordered) with acetaminophen.  Maximum acetaminophen dose from all sources = 75 mg/kg/day not to exceed 4 grams/day.     0623-Auto Hold       1017-Unhold         0955-Auto Hold       1328-Unhold         1630-Auto Hold       1650-Unhold       1704-Auto Hold       2058-Unhold       2128-Med Discontinued           Dose: 975 mg Freq: EVERY 8 HOURS Route: PO  Start: 12/06/17 1400   End: 12/08/17 2128   Admin Instructions: Do not use if patient has an active opioid/acetaminophen analgesic order for pain  Maximum acetaminophen dose from all sources = 75 mg/kg/day not to exceed 4 grams/day.       (1400)-Not Given       (2149)-Not Given        (0646)-Not Given [C]       (1535)-Not Given       (2132)-Not Given        (0549)-Not Given [C]        (1307)-Not Given       1630-Auto Hold       1650-Unhold       1704-Auto Hold       2058-Unhold       2128-Med Discontinued           Dose: 1 g Freq: SEE ADMIN INSTRUCTIONS Route: IV  Indications of Use: PERIOPERATIVE PHARMACOPROPHYLAXIS  Start: 12/08/17 1624   End: 12/08/17 1918   Admin Instructions: Intra-Op Dose.  Give every 2 hours while patient in surgery, starting 2 hours after pre-op dose.  DO NOT GIVE intra-op dose if CrCl less than 10 mL/min (on dialysis).  If CrCL less than 50 mL/min, double the time interval between doses.         1918-Med Discontinued           Freq: PRN  Start: 12/08/17 1815   End: 12/08/17 2058        1815 (1,000 mL)-Given       2058-Med Discontinued           Dose: 25-50 mcg Freq: EVERY 5 MIN PRN Route: IV  PRN Reason: other  PRN Comment: pain control or improvement in physical function.  Start: 12/08/17 1927   End: 12/08/17 2058   Admin Instructions: Start at the lowest dose. May adjust dose by 5 mcg every 5 minutes (MAX cumulative dose = 250 mcg) as needed for pain control or improvement in physical function.  Hold dose for analgesic side effects.  Notify provider to assess for uncontrolled pain or analgesic side effects.  Use fentaNYL (SUBLIMAZE) initially, as a short acting agent for acute pain control.  If insufficient, or a longer acting agent is needed, begin morphine or HYDROmorphone (DILAUDID) if ordered.  For ordered doses up to 100 mcg give IV Push undiluted over a minimum of 3-5 minutes.         2058-Med Discontinued           Dose: 5,000 Units Freq: EVERY 8 HOURS Route: SC  Start: 12/07/17 2215   End: 12/07/17 1744   Admin Instructions: Check to make sure start date/time is 12-24 hours post op unless documented complication.   Continue until discharge to home.   HOLD if platelet count falls below 50% of baseline or less than 100,000/ L and notify provider.  Medicine team to determine dosing for heparin drip. He will need to transition to coumadin per their instruction.         1744-Med Discontinued            Dose: 5,000 Units Freq: EVERY 8 HOURS Route: SC  Start: 12/07/17 1415   End: 12/07/17 1558   Admin Instructions: Check to make sure start date/time is 12-24 hours post op unless documented complication.   Continue until discharge to home.   HOLD if platelet count falls below 50% of baseline or less than 100,000/ L and notify provider.  Medicine team to determine dosing for heparin drip. He will need to transition to coumadin per their instruction.        1541 (5,000 Units)-Given       1558-Med Discontinued            Dose: 0.3-0.5 mg Freq: EVERY 5 MIN PRN Route: IV  PRN Reason: other  PRN Comment: pain control or improvement in physical function.  May administer if Respiratory Rate is greater than 10  Start: 12/08/17 1927   End: 12/08/17 2058   Admin Instructions: Start at the lowest dose.  May adjust dose by 0.1 mg every 5 minutes (MAX cumulative dose = 2 mg)  hours as needed for pain control or improvement in physical function.  Hold dose for analgesic side effects.  Notify provider to assess for uncontrolled pain or analgesic side effects.   If fentaNYL (SUMBLIMAZE) is also ordered, use HYDROmorphone (DILAUDID) if pain control insufficient with fentanyl or a longer acting agent is needed.  For ordered doses up to 4 mg give IV Push undiluted. Administer each 2mg over 2-5 minutes.         1931 (0.5 mg)-Given       2008 (0.5 mg)-Given       2058-Med Discontinued           Dose: 0.3-0.5 mg Freq: EVERY 2 HOURS PRN Route: IV  PRN Reason: severe pain  PRN Comment: or if patient unable to take PO  Start: 12/06/17 1359   End: 12/08/17 2124   Admin Instructions: Hold while on PCA.  For ordered doses up to 4 mg give IV Push undiluted. Administer each 2mg over 2-5 minutes.         1630-Auto Hold       1650-Unhold       1704-Auto Hold       2058-Unhold       2124-Med Discontinued           Dose: 10 mg Freq: ONCE PRN Route: IV  PRN Reason: high blood pressure  PRN Comment: for Systemic  "Blood Pressure greater than 160 mmHg and Heart Rate greater than 60 bpm.    Start: 12/08/17 1927   End: 12/08/17 2058   Admin Instructions: For PACU USE ONLY.  DC WHEN TRANSFERRED TO FLOOR.  For ordered doses up to 80 mg, give IV Push undiluted. Give each 20 mg over 2 minutes.         2058-Med Discontinued           Rate: 100 mL/hr Freq: CONTINUOUS Route: IV  Start: 12/08/17 1930   End: 12/08/17 2058   Admin Instructions: Continue until IV catheter is weaned                2058-Med Discontinued           Rate: 25 mL/hr Freq: CONTINUOUS Route: IV  Start: 12/08/17 1630   End: 12/08/17 1918   Admin Instructions: IF patient NOT on dialysis.         1756 ( )-New Bag       1908 ( )-Anesthesia Volume Adjustment       1918-Med Discontinued           Rate: 75 mL/hr Freq: CONTINUOUS Route: IV  Start: 12/06/17 1400   End: 12/08/17 2128   Admin Instructions: Change to saline lock when PO well tolerated.       1441 ( )-New Bag       1549 ( )-New Bag        0537 ( )-New Bag       1900 ( )-New Bag        0156 ( )-Rate/Dose Verify       1005 ( )-New Bag       2128-Med Discontinued           Freq: EVERY 1 HOUR PRN Route: Top  PRN Reason: pain  PRN Comment: with VAD insertion or accessing implanted port.  Start: 12/06/17 1359   End: 12/08/17 2128   Admin Instructions: Do NOT give if patient has a history of allergy to any local anesthetic or any \"bethany\" product.   Apply 30 minutes prior to VAD insertion or port access.  MAX Dose:  2.5 g (  of 5 g tube)         1630-Auto Hold       1650-Unhold       1704-Auto Hold       2058-Unhold       2128-Med Discontinued           Dose: 1 mL Freq: EVERY 1 HOUR PRN Route: OTHER  PRN Comment: mild pain with VAD insertion or accessing implanted port  Start: 12/08/17 1624   End: 12/08/17 1918   Admin Instructions: Do NOT give if patient has a history of allergy to any local anesthetic or any \"bethany\" product. MAX dose 1 mL subcutaneous OR intradermal in divided doses.         1918-Med Discontinued     " "      Dose: 1 mL Freq: EVERY 1 HOUR PRN Route: OTHER  PRN Comment: mild pain with VAD insertion or accessing implanted port  Start: 12/06/17 1359   End: 12/08/17 2128   Admin Instructions: Do NOT give if patient has a history of allergy to any local anesthetic or any \"bethany\" product. MAX dose 1 mL subcutaneous OR intradermal in divided doses.         1630-Auto Hold       1650-Unhold       1704-Auto Hold       2058-Unhold       2128-Med Discontinued           Dose: 0.1-0.4 mg Freq: EVERY 2 MIN PRN Route: IV  PRN Reason: opioid reversal  Start: 12/08/17 2104   End: 12/08/17 2128   Admin Instructions: For apnea or imminent respiratory arrest: give 0.4 mg IV undiluted Q 2 minutes PRN until desired degree of reversal is obtained, stop opioid and notify provider. Continue monitoring until discharge criteria are met for a minimum of 2 hours.  For severe sedation, decrease in respiratory depth, quality or respiratory rate less than 8: give 0.1 mg IV Q 2 minutes x 3 doses, stop opioid and notify provider.  Try to minimize reversal of analgesia especially in end-of-life patients  For ordered doses up to 2mg give IVP. Give each 0.4mg over 15 seconds in emergency situations. For non-emergent situations further dilute in 9mL of NS to facilitate titration of response.         2128-Med Discontinued           Dose: 0.1-0.4 mg Freq: EVERY 2 MIN PRN Route: IV  PRN Reason: opioid reversal  Start: 12/06/17 1359   End: 12/08/17 2128   Admin Instructions: For respiratory rate LESS than or EQUAL to 8.  Partial reversal dose:  0.1 mg titrated q 2 minutes for Analgesia Side Effects Monitoring Sedation Level of 3 (frequently drowsy, arousable, drifts to sleep during conversation).Full reversal dose:  0.4 mg bolus for Analgesia Side Effects Monitoring Sedation Level of 4 (somnolent, minimal or no response to stimulation).  For ordered doses up to 2mg give IVP. Give each 0.4mg over 15 seconds in emergency situations. For non-emergent " situations further dilute in 9mL of NS to facilitate titration of response.         1630-Auto Hold       1650-Unhold       1704-Auto Hold       2058-Unhold       2128-Med Discontinued           Dose: 4 mg Freq: EVERY 30 MIN PRN Route: PO  PRN Reason: nausea  Start: 12/08/17 1927   End: 12/08/17 2058   Admin Instructions: MAX total dose = 8 mg, including OR dosing. If not resolved in 15 minutes, then go to step 2 [prochlorperazine (COMPAZINE), if ordered].         2058-Med Discontinued        Or    Dose: 4 mg Freq: EVERY 30 MIN PRN Route: IV  PRN Reason: nausea  Start: 12/08/17 1927   End: 12/08/17 2058   Admin Instructions: MAX total dose = 8 mg, including OR dosing. If not resolved in 15 minutes, then go to step 2 [prochlorperazine (COMPAZINE), if ordered].  Irritant. For ordered doses up to 4 mg, give IV Push undiluted over 2-5 minutes.         2058-Med Discontinued           Dose: 5-10 mg Freq: EVERY 4 HOURS PRN Route: PO  PRN Reason: moderate to severe pain  Start: 12/06/17 1359   End: 12/08/17 2129   Admin Instructions: Hold while on PCA or with regular IV opioid dosing.  IF CrCl UNKNOWN start at lowest end of dosing range.        2136 (5 mg)-Given        1630-Auto Hold       1650-Unhold       1704-Auto Hold       2058-Unhold       2129-Med Discontinued           Dose: 3 mL Freq: EVERY 8 HOURS Route: IK  Start: 12/08/17 1630   End: 12/08/17 1918   Admin Instructions: And Q1H PRN, to lock peripheral IV dormant line.                1918-Med Discontinued           Dose: 3 mL Freq: EVERY 8 HOURS Route: IK  Start: 12/06/17 1400   End: 12/08/17 1057   Admin Instructions: And Q1H PRN, to lock peripheral IV dormant line.       (1550)-Not Given        (0127)-Not Given       (0925)-Not Given       (1536)-Not Given        (0154)-Not Given       (1006)-Not Given       1057-Med Discontinued           Dose: 3 mL Freq: EVERY 1 HOUR PRN Route: IK  PRN Reason: line flush  PRN Comment: for peripheral IV flush post IV  meds  Start: 12/06/17 1359   End: 12/08/17 2129        1630-Auto Hold       1650-Unhold       1704-Auto Hold       2058-Unhold       2129-Med Discontinued           Dose: 3 mL Freq: EVERY 8 HOURS Route: IK  Start: 12/04/17 1030   End: 12/08/17 2129   Admin Instructions: And Q1H PRN, to lock peripheral IV dormant line.     (1202)-Not Given       1843 (3 mL)-Given        0159 (3 mL)-Given       0913 (3 mL)-Given       (1041)-Not Given       1948 (3 mL)-Given        0426 (3 mL)-Given       0955-Auto Hold       1030-Automatically Held       1328-Unhold       (1800)-Not Given        0349 (3 mL)-Given       (0933)-Not Given       (1802)-Not Given        (0154)-Not Given       (1057)-Not Given       1630-Auto Hold       1650-Unhold       1704-Auto Hold       1830-Automatically Held       2058-Unhold       2129-Med Discontinued           Dose: 3 mL Freq: EVERY 1 HOUR PRN Route: IK  PRN Reason: line flush  PRN Comment: for peripheral IV flush post IV meds  Start: 12/04/17 1028   End: 12/08/17 2129      0955-Auto Hold       1328-Unhold         1630-Auto Hold       1650-Unhold       1704-Auto Hold       2058-Unhold       2129-Med Discontinued           Freq: PRN  Start: 12/08/17 1754   End: 12/08/17 2058        1754 (1,000 mL)-Given       2058-Med Discontinued      Medications 12/04/17 12/05/17 12/06/17 12/07/17 12/08/17 12/09/17 12/10/17

## 2017-11-29 NOTE — TELEPHONE ENCOUNTER
Spoke with patient:   Left foot amputated about 4-5 years   Pressure, pain, swelling around stump area   In bed with leg up 2/10 pain   Any movement 9/10 pain   Lots of redness and yellow discoloration also - noticed last night and this morning   No hx of infection there   Pain is pins and needles and sometimes just pressure   Some weeping   Started Saturday or Sunday and is gradually worsening   Very painful, cannot get into prosthetic device   Checked this .5 F   Prior to this yesterday  F   Cannot take APAP - has not taken anything to reduce fever   No cool shower   No recent illness/injury   No radiating pain   Sunday evening threw up - 3 episodes of vomiting   Has not eaten for about 4 days - no appetite   Has been drinking lots of water  Putting ice on area - not helping     Strongly advised ED visit as pt has pain, swelling, redness, pressure, and rising fever.   Pt hesitant to go to ED and is asking PCP to review message to verify if he should be seen in clinic or if ED visit is needed.   Please advise     Pt requesting call back at #[phone]

## 2017-11-29 NOTE — IP AVS SNAPSHOT
"` [place] [address] [facility]: [phone]                                              INTERAGENCY TRANSFER FORM - NURSING   2017                    Hospital Admission Date: 2017  GALILEA LUGO   : 1945  Sex: Male        Attending Provider: Saima Howard MD     Allergies:  Hmg-coa-r Inhibitors, Ciprofloxacin    Infection:  None   Service:  HOSPITALIST    Ht:  1.854 m (6' 1\")   Wt:  105.8 kg (233 lb 4 oz)   Admission Wt:  99.8 kg (220 lb)    BMI:  30.77 kg/m 2   BSA:  2.33 m 2            Patient PCP Information     Provider PCP Type    Main Hardy MD General      Current Code Status     Date Active Code Status Order ID Comments User Context       2017  9:04 PM Full Code [id number]  Saima Howard MD Inpatient       Code Status History     Date Active Date Inactive Code Status Order ID Comments User Context    2017  1:59 PM 2017  9:04 PM Full Code [id number]  Saima Howard MD Inpatient    2017 10:29 AM 2017  1:59 PM Full Code [id number]  Von Gomez MD Inpatient    2017 10:28 AM 2017 10:29 AM Full Code [id number]  Lejeune, Stacey, MD Inpatient    12/3/2017  3:04 PM 2017 10:28 AM Full Code [id number]  Omaira Coleman DO Inpatient    2017  7:52 PM 12/3/2017  3:04 PM Special Code [id number] Parameters:  Nursing to determine Saima Howard MD Inpatient    2017  7:56 PM 2017  7:52 PM Full Code [id number]  Davie Phillips MD Inpatient    2014  5:31 PM 2017  7:56 PM Full Code [id number]  Davie Gonzales PA-C Outpatient    2014  1:39 PM 2014  5:31 PM Full Code [id number]  Cecelia Garcia MD Inpatient      Advance Directives        Does patient have a scanned Advance Directive/ACP document in EPIC?           No        Hospital Problems as of 12/10/2017              Priority Class Noted POA    Alcoholic cirrhosis of liver (H) Medium  2014 Yes    Chronic " kidney disease, stage III (moderate) Medium  9/24/2014 Yes    CAD, MI in 2007 -- S/P CABG x 3 in 2007 Medium  5/24/2017 Yes    Prostate cancer -- S/P Radiative Seed implants in 2000 (no problems since) Medium  5/24/2017 Yes    Antiphospholipid Jina Syn, Hypercoag (DVT, PE) -- has IVC filt and Warfarin Medium  5/24/2017 Yes    Thoracic aortic aneurysm without rupture (H) Medium  5/24/2017 Yes    Chronic congestive heart failure, unspecified congestive heart failure type (H) Medium  6/30/2017 Yes    * (Principal)Abscess of Left BKA -- S/P I&D 12/1/17, MSSA Medium  11/29/2017 Unknown    Paroxysmal a-fib (H) Medium  11/29/2017 Unknown    Cellulitis and abscess of leg Medium  11/29/2017 Yes    Thrombocytopenia -- suspect related to alcohol use Medium  11/29/2017 Unknown    Abscess Medium  12/1/2017 Yes    Infection of left below knee amputation (H) Medium  12/4/2017 Yes      Non-Hospital Problems as of 12/10/2017              Priority Class Noted    Right knee DJD Medium  9/19/2014    Alcohol abuse Medium  9/24/2014    Physical deconditioning Medium  9/24/2014    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 (H) Medium  5/24/2017      Immunizations     Name Date      Influenza (High Dose) 3 valent vaccine 09/25/17     Influenza (High Dose) 3 valent vaccine 09/01/16     Influenza (High Dose) 3 valent vaccine 09/20/14     Pneumococcal (PCV 13) 06/21/16     Pneumococcal 23 valent 09/22/14     TDAP Vaccine (Adacel) 10/06/16          END      ASSESSMENT     Discharge Profile Flowsheet     EXPECTED DISCHARGE     FINAL RESOURCES      Expected Discharge Date  12/09/17 (TCU) 12/09/17 1048   Resources List  Transitional Care 12/08/17 1301    DISCHARGE NEEDS ASSESSMENT     SKIN      Concerns To Be Addressed  discharge planning concerns 12/08/17 1301   Inspection of bony prominences  Procedural focused assessment (identify areas inspected) 12/09/17 1655    Patient/family verbalizes understanding of discharge plan recommendations?   Yes 12/08/17 1301   Skin WDL  ex 12/10/17 1006    Medical Team notified of plan?  yes 12/08/17 1301   Skin Temperature  warm 12/09/17 0120    Equipment Currently Used at Home  raised toilet;walker, rolling;grab bar 12/05/17 1541   Skin Integrity  incision(s) 12/10/17 1006    Transportation Available  family or friend will provide 12/08/17 1301   Skin Moisture  dry 12/09/17 0120    Current Discharge Risk  dependent with mobility/activities of daily living;physical impairment 12/08/17 1301   Full except areas not inspected   Buttock, left;Buttock, right;Sacrum;Coccyx 12/09/17 0120    # of Referrals Placed by CTS  Post Acute Facilities 12/08/17 1301   Procedural focused assessment (identify areas inspected)   Occiput;Nose;Cheek, left;Cheek, right;Ear, left;Ear, right;Scapula, left;Scapula, right;Elbow, left;Elbow, right;Hip, left;Hip, right;Knee, left;Knee, right 12/08/17 1947    Equipment Used at Home  -- (w/c, SeC and WW in [place]- has reacher) 09/21/14 1005   Inspection under devices  Full 12/10/17 0152    GASTROINTESTINAL (ADULT,PEDIATRIC,OB)     Skin Color/Characteristics  redness blanchable 12/10/17 0152    GI WDL  WDL 12/10/17 1006   Skin Elasticity  quick return to original state 12/09/17 0120    All Quadrants Bowel Sounds  hyperactive 12/09/17 1034   Not Inspected under devices.  Glasses 12/08/17 2204    Last Bowel Movement  12/09/17 12/09/17 1502   SAFETY      GI Signs/Symptoms  constipation 12/04/17 2003   Safety WDL  WDL 12/10/17 1006    Passing flatus  yes 12/09/17 1502   All Alarms  alarm(s) activated and audible 12/10/17 1006    COMMUNICATION ASSESSMENT     Safety Factors  bed in low position 12/09/17 0120    Patient's communication style  spoken language (English or Bilingual) 11/29/17 1624                      Assessment WDL (Within Defined Limits) Definitions           Safety WDL     Effective: 09/28/15    Row Information: <b>WDL Definition:</b> Bed in low position, wheels locked; call light in reach;  "upper side rails up x 2; ID band on<br> <font color=\"gray\"><i>Item=AS safety wdl>>List=AS safety wdl>>Version=F14</i></font>      Skin WDL     Effective: 09/28/15    Row Information: <b>WDL Definition:</b> Warm; dry; intact; elastic; without discoloration; pressure points without redness<br> <font color=\"gray\"><i>Item=AS skin wdl>>List=AS skin wdl>>Version=F14</i></font>      Vitals     Vital Signs Flowsheet     COMMENTS     Response to Interventions  Decrease in pain 12/09/17 1454    Comments  Arrived with pt. in pre-op. 12/04/17 0724   ANALGESIA SIDE EFFECTS MONITORING      VITAL SIGNS     Side Effects Monitoring: Respiratory Quality  R 12/10/17 0144    Temp  98.3  F (36.8  C) 12/10/17 0823   Side Effects Monitoring: Respiratory Depth  N 12/10/17 0144    Temp src  Oral 12/10/17 0823   Side Effects Monitoring: Sedation Level  1 12/10/17 0144    Resp  16 12/10/17 0823   HEIGHT AND WEIGHT      Pulse  64 12/10/17 0144   Height  1.854 m (6' 1\") 11/29/17 1631    Heart Rate  64 12/10/17 0936   Weight  105.8 kg (233 lb 4 oz) 12/08/17 0736    Pulse/Heart Rate Source  Monitor 12/10/17 0823   Weight Method  Bed scale 12/08/17 0736    BP  110/60 12/10/17 0823   BSA (Calculated - sq m)  2.27 11/29/17 1631    BP Location  Right arm 12/10/17 0823   BMI (Calculated)  29.09 11/29/17 1631    Patient Position  Lying 12/08/17 1711   ESHA COMA SCALE      OXYGEN THERAPY     Best Eye Response  4-->(E4) spontaneous 12/09/17 0058    SpO2  96 % 12/10/17 0823   Best Motor Response  6-->(M6) obeys commands 12/09/17 0058    O2 Device  None (Room air) 12/10/17 0823   Best Verbal Response  5-->(V5) oriented 12/09/17 0058    Oxygen Delivery  3 LPM 12/08/17 2041   Esha Coma Scale Score  15 12/09/17 0058    RESPIRATORY MONITORING     POSITIONING      Respiratory Monitoring (EtCO2)  35 mmHg 12/06/17 1308   Body Position  independently positioning 12/10/17 1014    Integrated Pulmonary Index (IPI)  8-9 12/06/17 1308   Head of Bed (HOB)  HOB at " 30-45 degrees 12/09/17 1655    PAIN/COMFORT     Positioning/Transfer Devices  pillows;applied 12/09/17 1655    Patient Currently in Pain  yes 12/10/17 0144   DAILY CARE      Preferred Pain Scale  number (Numeric Rating Pain Scale) 12/09/17 1455   Activity Management  activity adjusted per tolerance 12/10/17 1014    Patient's Stated Pain Goal  No pain 12/08/17 2027   Activity Assistance Provided  assistance, 1 person 12/10/17 1014    0-10 Pain Scale  9 12/10/17 0142   Assistive Device Utilized  walker 12/10/17 1014    Word Pain Scale  4 12/06/17 1305   ECG      Pain Location  Leg 12/10/17 0144   ECG Rhythm  Atrial fibrillation 12/08/17 0231    Pain Orientation  Right 12/10/17 0144   Ectopy  None 12/04/17 0857    Pain Descriptors  Constant 12/09/17 1037   Lead Monitored  Lead II 12/06/17 1232    Pain Management Interventions  analgesia administered 12/09/17 1037   Equipment  electrodes changed;telemetry batteries changed 12/08/17 1437    Pain Intervention(s)  Medication (See eMAR) 12/10/17 0144                 Patient Lines/Drains/Airways Status    Active LINES/DRAINS/AIRWAYS     Name: Placement date: Placement time: Site: Days: Last dressing change:    Open Drain Left Other (Comment) 19 Austrian 12/08/17   1845   Other (Comment)   1     Wound 11/29/17 Left Elbow Abrasion(s) 11/29/17   2030   Elbow   10     Wound 11/29/17 Right Foot Abrasion(s) 11/29/17   2030   Foot   10     Incision/Surgical Site 12/08/17 Left Leg 12/08/17   1913    1             Patient Lines/Drains/Airways Status    Active PICC/CVC     None            Intake/Output Detail Report     Date Intake         Output     Net    Shift P.O. I.V. Other IV Piggyback Blood Components Total Urine Drains Blood Total       Noc 12/08/17 2300 - 12/09/17 0659 -- -- -- -- -- -- 300 -- -- 300 -300    Day 12/09/17 0700 - 12/09/17 1459 240 981 -- -- -- 1221 250 -- -- 250 971    Bruna 12/09/17 1500 - 12/09/17 2259 450 -- -- -- -- 450 200 -- -- 200 250    Noc 12/09/17 7092 -  12/10/17 0659 -- -- -- -- -- -- -- -- -- -- 0    Day 12/10/17 0700 - 12/10/17 1459 500 -- -- -- -- 500 -- -- -- -- 500      Last Void/BM       Most Recent Value    Urine Occurrence 1 at 12/09/2017 0900    Stool Occurrence 1 at 12/09/2017 1255      Case Management/Discharge Planning     Case Management/Discharge Planning Flowsheet     REFERRAL INFORMATION     Quality of Family Relationships  supportive;involved 12/08/17 1301    Did the Initial Social Work Assessment result in a Social Work Case?  Yes 12/08/17 1300   COPING/STRESS      Admission Type  inpatient 12/08/17 1300   Major Change/Loss/Stressor  hospitalization 12/02/17 1802    Arrived From  home or self-care 12/08/17 1300   EXPECTED DISCHARGE      Referral Source  physician 12/08/17 1300   Expected Discharge Date  12/09/17 (TCU) 12/09/17 1048    # of Referrals Placed by CTS  Post Acute Facilities 12/08/17 1301   ASSESSMENT/CONCERNS TO BE ADDRESSED      Post Acute Facilities  TCU 12/08/17 1301   Concerns To Be Addressed  discharge planning concerns 12/08/17 1301    Reason For Consult  discharge planning 12/08/17 1300   DISCHARGE PLANNING      Record Reviewed  medical record 12/08/17 1300   Patient/family verbalizes understanding of discharge plan recommendations?  Yes 12/08/17 1301    CTS Assigned to Case  Gracy Jacobs 12/08/17 1300   Medical Team notified of plan?  yes 12/08/17 1301    LIVING ENVIRONMENT     Transportation Available  family or friend will provide 12/08/17 1301    Lives With  spouse 12/08/17 1300   Current Discharge Risk  dependent with mobility/activities of daily living;physical impairment 12/08/17 1301    Living Arrangements  apartment 12/08/17 1300   Equipment Used at Home  -- (w/c, SeC and WW in [place]- has reacher) 09/21/14 1005    Provides Primary Care For  no one 12/08/17 1300   FINAL RESOURCES      Quality Of Family Relationships  supportive;helpful;involved 12/08/17 1300   Equipment Currently Used at Home  raised toilet;walker,  rolling;maranda bar 12/05/17 1541    Able to Return to Prior Living Arrangements  no 12/08/17 1300   Resources List  Transitional Care 12/08/17 1301    HOME SAFETY     ABUSE RISK SCREEN      Patient Feels Safe Living in Home?  yes 12/08/17 1300   QUESTION TO PATIENT:  Has a member of your family or a partner(now or in the past) intimidated, hurt, manipulated, or controlled you in any way?  no 11/29/17 1626    ASSESSMENT OF FAMILY/SOCIAL SUPPORT     QUESTION TO PATIENT: Do you feel safe going back to the place where you are living?  yes 11/29/17 1626    Marital Status   12/08/17 1300   OBSERVATION: Is there reason to believe there has been maltreatment of a vulnerable adult (ie. Physical/Sexual/Emotional abuse, self neglect, lack of adequate food, shelter, medical care, or financial exploitation)?  no 11/29/17 1626    Who is your support system?  Wife 12/08/17 1300   (R) MENTAL HEALTH SUICIDE RISK      Spouse's Name  Helena 12/08/17 1301   Are you depressed or being treated for depression?  No 12/02/17 1555    Description of Support System  Supportive;Involved 12/08/17 1301   HOMICIDE RISK      Support Assessment  Adequate family and caregiver support 12/08/17 1301   Feels Like Hurting Others  no 11/29/17 1626

## 2017-11-30 LAB
ANION GAP SERPL CALCULATED.3IONS-SCNC: 9 MMOL/L (ref 3–14)
AST SERPL W P-5'-P-CCNC: 18 U/L (ref 0–45)
BUN SERPL-MCNC: 25 MG/DL (ref 7–30)
CALCIUM SERPL-MCNC: 8.3 MG/DL (ref 8.5–10.1)
CHLORIDE SERPL-SCNC: 99 MMOL/L (ref 94–109)
CO2 SERPL-SCNC: 26 MMOL/L (ref 20–32)
CREAT SERPL-MCNC: 1.27 MG/DL (ref 0.66–1.25)
ERYTHROCYTE [DISTWIDTH] IN BLOOD BY AUTOMATED COUNT: 14.5 % (ref 10–15)
FACT X ACT/NOR PPP CHRO: 31 % (ref 70–130)
GFR SERPL CREATININE-BSD FRML MDRD: 55 ML/MIN/1.7M2
GLUCOSE SERPL-MCNC: 95 MG/DL (ref 70–99)
HCT VFR BLD AUTO: 35.6 % (ref 40–53)
HGB BLD-MCNC: 12.5 G/DL (ref 13.3–17.7)
INR PPP: 2.56 (ref 0.86–1.14)
MCH RBC QN AUTO: 32.9 PG (ref 26.5–33)
MCHC RBC AUTO-ENTMCNC: 35.1 G/DL (ref 31.5–36.5)
MCV RBC AUTO: 94 FL (ref 78–100)
PLATELET # BLD AUTO: 106 10E9/L (ref 150–450)
POTASSIUM SERPL-SCNC: 3.1 MMOL/L (ref 3.4–5.3)
POTASSIUM SERPL-SCNC: 3.7 MMOL/L (ref 3.4–5.3)
PREALB SERPL IA-MCNC: 4 MG/DL (ref 15–45)
RBC # BLD AUTO: 3.8 10E12/L (ref 4.4–5.9)
SODIUM SERPL-SCNC: 134 MMOL/L (ref 133–144)
WBC # BLD AUTO: 7.8 10E9/L (ref 4–11)

## 2017-11-30 PROCEDURE — A9270 NON-COVERED ITEM OR SERVICE: HCPCS | Mod: GY | Performed by: INTERNAL MEDICINE

## 2017-11-30 PROCEDURE — 85610 PROTHROMBIN TIME: CPT | Performed by: INTERNAL MEDICINE

## 2017-11-30 PROCEDURE — 80048 BASIC METABOLIC PNL TOTAL CA: CPT | Performed by: INTERNAL MEDICINE

## 2017-11-30 PROCEDURE — 25000132 ZZH RX MED GY IP 250 OP 250 PS 637: Mod: GY | Performed by: INTERNAL MEDICINE

## 2017-11-30 PROCEDURE — 36415 COLL VENOUS BLD VENIPUNCTURE: CPT | Performed by: INTERNAL MEDICINE

## 2017-11-30 PROCEDURE — 99233 SBSQ HOSP IP/OBS HIGH 50: CPT | Performed by: INTERNAL MEDICINE

## 2017-11-30 PROCEDURE — 84134 ASSAY OF PREALBUMIN: CPT | Performed by: INTERNAL MEDICINE

## 2017-11-30 PROCEDURE — 84450 TRANSFERASE (AST) (SGOT): CPT | Performed by: INTERNAL MEDICINE

## 2017-11-30 PROCEDURE — 85027 COMPLETE CBC AUTOMATED: CPT | Performed by: INTERNAL MEDICINE

## 2017-11-30 PROCEDURE — 25000128 H RX IP 250 OP 636: Performed by: INTERNAL MEDICINE

## 2017-11-30 PROCEDURE — 25000128 H RX IP 250 OP 636: Performed by: ORTHOPAEDIC SURGERY

## 2017-11-30 PROCEDURE — 12000000 ZZH R&B MED SURG/OB

## 2017-11-30 PROCEDURE — 84132 ASSAY OF SERUM POTASSIUM: CPT | Performed by: INTERNAL MEDICINE

## 2017-11-30 PROCEDURE — 85260 CLOT FACTOR X STUART-POWER: CPT | Performed by: INTERNAL MEDICINE

## 2017-11-30 RX ORDER — POTASSIUM CHLORIDE 29.8 MG/ML
20 INJECTION INTRAVENOUS
Status: DISCONTINUED | OUTPATIENT
Start: 2017-11-30 | End: 2017-12-07

## 2017-11-30 RX ORDER — OXYCODONE HYDROCHLORIDE 5 MG/1
5 TABLET ORAL EVERY 4 HOURS PRN
Status: DISCONTINUED | OUTPATIENT
Start: 2017-11-30 | End: 2017-12-01

## 2017-11-30 RX ORDER — POTASSIUM CHLORIDE 1.5 G/1.58G
20-40 POWDER, FOR SOLUTION ORAL
Status: DISCONTINUED | OUTPATIENT
Start: 2017-11-30 | End: 2017-12-07

## 2017-11-30 RX ORDER — POTASSIUM CHLORIDE 1500 MG/1
20-40 TABLET, EXTENDED RELEASE ORAL
Status: DISCONTINUED | OUTPATIENT
Start: 2017-11-30 | End: 2017-12-07

## 2017-11-30 RX ORDER — POTASSIUM CL/LIDO/0.9 % NACL 10MEQ/0.1L
10 INTRAVENOUS SOLUTION, PIGGYBACK (ML) INTRAVENOUS
Status: DISCONTINUED | OUTPATIENT
Start: 2017-11-30 | End: 2017-12-07

## 2017-11-30 RX ORDER — POTASSIUM CHLORIDE 7.45 MG/ML
10 INJECTION INTRAVENOUS
Status: DISCONTINUED | OUTPATIENT
Start: 2017-11-30 | End: 2017-12-07

## 2017-11-30 RX ADMIN — POTASSIUM CHLORIDE 20 MEQ: 1500 TABLET, EXTENDED RELEASE ORAL at 13:37

## 2017-11-30 RX ADMIN — GABAPENTIN 600 MG: 600 TABLET, FILM COATED ORAL at 16:41

## 2017-11-30 RX ADMIN — HYDROMORPHONE HYDROCHLORIDE 0.5 MG: 1 INJECTION, SOLUTION INTRAMUSCULAR; INTRAVENOUS; SUBCUTANEOUS at 08:58

## 2017-11-30 RX ADMIN — TAZOBACTAM SODIUM AND PIPERACILLIN SODIUM 3.38 G: 375; 3 INJECTION, SOLUTION INTRAVENOUS at 00:55

## 2017-11-30 RX ADMIN — TORSEMIDE 20 MG: 20 TABLET ORAL at 10:23

## 2017-11-30 RX ADMIN — TAZOBACTAM SODIUM AND PIPERACILLIN SODIUM 3.38 G: 375; 3 INJECTION, SOLUTION INTRAVENOUS at 05:58

## 2017-11-30 RX ADMIN — PHYTONADIONE 2 MG: 2 INJECTION, EMULSION INTRAMUSCULAR; INTRAVENOUS; SUBCUTANEOUS at 22:13

## 2017-11-30 RX ADMIN — HYDROMORPHONE HYDROCHLORIDE 0.5 MG: 1 INJECTION, SOLUTION INTRAMUSCULAR; INTRAVENOUS; SUBCUTANEOUS at 00:52

## 2017-11-30 RX ADMIN — TORSEMIDE 20 MG: 20 TABLET ORAL at 21:18

## 2017-11-30 RX ADMIN — CARVEDILOL 3.12 MG: 3.12 TABLET, FILM COATED ORAL at 10:24

## 2017-11-30 RX ADMIN — OXYCODONE HYDROCHLORIDE 5 MG: 5 TABLET ORAL at 11:33

## 2017-11-30 RX ADMIN — VANCOMYCIN HYDROCHLORIDE 1750 MG: 5 INJECTION, POWDER, LYOPHILIZED, FOR SOLUTION INTRAVENOUS at 06:41

## 2017-11-30 RX ADMIN — CARVEDILOL 3.12 MG: 3.12 TABLET, FILM COATED ORAL at 18:00

## 2017-11-30 RX ADMIN — POTASSIUM CHLORIDE 10 MEQ: 750 TABLET, EXTENDED RELEASE ORAL at 10:25

## 2017-11-30 RX ADMIN — GABAPENTIN 600 MG: 600 TABLET, FILM COATED ORAL at 10:24

## 2017-11-30 RX ADMIN — POTASSIUM CHLORIDE 40 MEQ: 1500 TABLET, EXTENDED RELEASE ORAL at 11:31

## 2017-11-30 RX ADMIN — OXYCODONE HYDROCHLORIDE 5 MG: 5 TABLET ORAL at 16:40

## 2017-11-30 RX ADMIN — TAZOBACTAM SODIUM AND PIPERACILLIN SODIUM 3.38 G: 375; 3 INJECTION, SOLUTION INTRAVENOUS at 18:01

## 2017-11-30 RX ADMIN — TAZOBACTAM SODIUM AND PIPERACILLIN SODIUM 3.38 G: 375; 3 INJECTION, SOLUTION INTRAVENOUS at 11:29

## 2017-11-30 RX ADMIN — GABAPENTIN 1200 MG: 300 CAPSULE ORAL at 21:18

## 2017-11-30 NOTE — ED NOTES
"[facility]  ED Nurse Handoff Report    ED Chief complaint: Wound Check (pt has left bka has has fevers and chills stump red and hot)      ED Diagnosis:   Final diagnoses:   Cellulitis and abscess of left lower extremity       Code Status: Full Code    Allergies:   Allergies   Allergen Reactions     Hmg-Coa-R Inhibitors Other (See Comments)     Rhabdomyolysis occurred within a couple days     Ciprofloxacin Itching     Severe itching \"like ants were crawling\"       Activity level - Baseline/Home:  Stand with Assist or with walker (without his prosthetic leg)    Activity Level - Current:   Stand with Assist     Needed?: No    Isolation: No  Infection: Not Applicable    Bariatric?: No    Vital Signs:   Vitals:    11/29/17 1625 11/29/17 1629   BP: 120/76    Pulse: 69    Resp: 18    Temp: 98.3  F (36.8  C)    TempSrc: Oral    SpO2: 97%    Weight:  99.8 kg (220 lb)   Height:  1.854 m (6' 1\")       Cardiac Rhythm: ,        Pain level: 0-10 Pain Scale: 5    Is this patient confused?: No    Patient Report: Initial Complaint: redness and swelling at the bottom of his left BTK stump  Focused Assessment: redness has been marked by MD, plan is for surgery to consult and possible take to OR to drain. IV antibiotic in hospital for a couple of days  Tests Performed: Ultrasound, Labs  Abnormal Results: see results   Treatments provided: abx Zosyn and Vanco will be hanging on arrival to ED     Family Comments: wife was at bedside and left to go home     OBS brochure/video discussed/provided to patient: N/A    ED Medications:   Medications   piperacillin-tazobactam (ZOSYN) infusion 3.375 g (3.375 g Intravenous New Bag 11/29/17 1836)   vancomycin (VANCOCIN) 1000 mg in dextrose 5% 200 mL PREMIX (0 mg Intravenous Stopped 11/29/17 1841)       Drips infusing?:  Yes      ED NURSE PHONE NUMBER: [phone]         "

## 2017-11-30 NOTE — PROGRESS NOTES
MD Notification    Notified Person:  MD    Notified Persons Name: Dr Coleman    Notification Date/Time: 10:35 AM      Notification Interaction:  Text paged Physician    Purpose of Notification: Asking MD to  order an oral narcotic that does NIOT have acetaminophen in it since pt has had liver issues and refuses Tylenol based products.     Orders Received:  Awaiting reply  Comments:

## 2017-11-30 NOTE — PLAN OF CARE
Problem: Patient Care Overview  Goal: Plan of Care/Patient Progress Review  Pt a/o x 4, stubborn. VSS. Pain in leg neck, given prn norco. Pt has been difficult with staff cares. Removed IV from hand. Refused to have sheets with blood changed. Was not very willing to take oral medications. Uses urinal at beside. L BKA is red hot to touch. Redness marked in ER. Tolerating cardiac diet, no caffine. Up with A1 at baseline with walker.

## 2017-11-30 NOTE — PHARMACY-ADMISSION MEDICATION HISTORY
Admission medication history interview status for the 11/29/2017  admission is complete. See EPIC admission navigator for prior to admission medications     Medication history source reliability:Good    Actions taken by pharmacist (provider contacted, etc):None     Additional medication history information not noted on PTA med list :None    Medication reconciliation/reorder completed by provider prior to medication history? No    Time spent in this activity: 12min    Prior to Admission medications    Medication Sig Last Dose Taking? Auth Provider   evolocumab (REPATHA) 140 MG/ML prefilled syringe Inject 1 pen subcutaneously every 2 weeks. For sample use only 11/17/2017 Yes Reported, Patient   potassium chloride SA (K-DUR/KLOR-CON M) 10 MEQ CR tablet Take 10 mEq by mouth daily 11/29/2017 at Unknown time Yes Reported, Patient   WARFARIN SODIUM PO Take 5 mg by mouth daily 5mg Monday to Saturday and no dose on sundays. 11/28/2017 at Unknown time Yes Reported, Patient   ASPIRIN EC PO Take 81 mg by mouth daily 11/29/2017 at Unknown time Yes Reported, Patient   Gabapentin (NEURONTIN PO) Take 600 mg by mouth 3 times daily 600 mg in morning, 600 mg midday & 1200 mg at HS 11/29/2017 at Unknown time Yes Reported, Patient   CARVEDILOL PO Take 3.125 mg by mouth 2 times daily (with meals)  11/29/2017 at Unknown time Yes Reported, Patient   order for DME [facility] Medical Order Phone [phone] Fax [phone]    Primary Dressing Woun'Dres Gel   Qty DO NOT DISPENSE  Secondary Dressing Mepilex 4x4 Qty 60 (for two wounds)  Length of Need: 1 month  Frequency of dressing change: daily   Sharyn Zavala PA-C   torsemide (DEMADEX) 20 MG tablet Take 20 mg by mouth 2 times daily    Reported, Patient

## 2017-11-30 NOTE — PROGRESS NOTES
[facility]    Hospitalist Progress Note    Assessment & Plan   Antonio Ramirez is a 72 year old male with complex PMHx including CAD with hx of 3V CABG in 2007, CHF, paroxysmal afib, thoracic aneurysm, stage III CKD, alcoholic cirrhosis, peripheral neuropathy, hx of MGUS, hx of prostate Ca, antiphospholipid Ab syndrome with hx of DVT/PE and IVC filter placement on chronic anticoagulation with warfarin and hx of L BKA in 2014 who was admitted on 11/29/2017 with pain and increased swelling in his stump with reported fever and chills with US showing a 6cm fluid collection.     Suspected L BKA Abscess/Cellulitis:  Reportedly saw a prosthetics specialist in [place] two weeks prior to admission who recommended a tighter prosthesis ruber sleeve, which the patient began wearing. 3-4d prior to admission he began to notice pain and swelling in his stump which continued to worsen. Endorsed fever and chills with Tmax 102 at home. Afebrile in ED, hemodynamically stable and WBC 11.0 on admission. Noted erythema of stump. US in ED showed a 6.6 c 3.1 x 1.6cm complex fluid collection concerning for abscess. Zosyn started on admission.   -- remains afebrile, blood cultures drawn on admission remain neg  -- cont IV Zosyn and Vanco  -- has prn Norco and IV dilaudid for pain control  -- orthopedic surgery consulted, await recs    Anticardiolipin Ab syndrome   With hx of DVT/PE. Has IVC filter in place and is on chronic anticoagulation with warfarin (monitors with chromogenic factor X levels). INR 2.53 on admission. Given 5mg IV Vitamin K on 11/29 PM dt possible need for I&D of abscess.   -- INR 2.56 this AM after vitamin K last night, chromogenic factor X level ordered (but is a send out lab, so may not be back till afternoon)  -- await ortho assessment, may need additional vitamin K if I&D recommended    CAD with hx of 3V CABG in 2007  Chronic Systolic CHF  Paroxysmal afib  Mild AS / Moderate MR  Hypertension /  "Hyperlipidemia  Echo through [facility] in 5/2017 showed EF 45-50% and reduced RVSF with elevated pulmonary pressures  Well managed with regimen of Coreg 3.125mg BID, aspirin 81mg daily, Repatha subQ every 2 wks (hx of statin intolerance) and torsemide 20mg BID.   -- appears euvolemic  -- home medications continued    Stage III CKD:  Cr 1.25 on 11/1/17, had been 1.3 - 1.6 earlier this year. Cr 1.23 on admission  -- renal function remains stable  -- daily BMPs while hospitalized    Alcoholic cirrhosis  Reportedly drinks 2 drinks per day. Encouraged to stop drinking.   AST nl this stay.    Peripheral Neuropathy:  EtOH use thought to be contributing.   Sx stable on gabapentin which has been continued    Hyponatremia: Resolved  Na 131 on admission. Normalized with po intake.    Hypokalemia:  K 3.1 this AM  Has scheduled K replacement while on diuretics  K replacement protocol ordered    Thrombocytopenia:  Chronic, likely related to EtOH use. Baseline appears to be in 120s. Likely exacerbated by infection as above    FEN: no IVFs, lytes as above, cardiac diet  DVT Prophylaxis: warfarin  Code Status: Full Code    Disposition: Pending ortho recommendations and response to treatment, likely 2-3d still.    Omaira Coleman    Interval History   Patient seen this morning. Frustrated by \"things not happening quickly\". Annoyed about where his IV had to be placed. Otherwise feeling okay. Denies cp/sob/cough. No significant pain in stump. Ate breakfast this AM.    -Data reviewed today: I reviewed all new labs and imaging results over the last 24 hours. I personally reviewed no images or EKG's today.    Physical Exam   Temp: 99.1  F (37.3  C) Temp src: Oral BP: 113/55 Pulse: 67 Heart Rate: 57 Resp: 16 SpO2: 97 % O2 Device: None (Room air)    Vitals:    11/29/17 1629   Weight: 99.8 kg (220 lb)     Vital Signs with Ranges  Temp:  [98  F (36.7  C)-99.1  F (37.3  C)] 99.1  F (37.3  C)  Pulse:  [67-69] 67  Heart Rate:  [54-57] " 57  Resp:  [16-18] 16  BP: (104-134)/(55-76) 113/55  SpO2:  [95 %-97 %] 97 %  I/O last 3 completed shifts:  In: -   Out: 300 [Urine:300]    Constitutional: Resting comfortably, alert and conversing appropriately, NAD  Respiratory: CTAB, no wheeze/rales/rhonchi, no increased work of breathing  Cardiovascular: HRRR w/++SM throughout precordium, no RLE edema  GI: S, NT, ND, +BS  Skin/Integumen: warmth and erythema of LLE stump with area of fluctuance on anterior aspect of stuump, no extension of erythema beyond borders demarcated in ED, minimal tenderness to palpation  Other:      Medications        docusate sodium  100 mg Oral BID     torsemide  20 mg Oral BID     potassium chloride SA  10 mEq Oral Daily     gabapentin (NEURONTIN) tablet 600 mg  600 mg Oral BID     carvedilol (COREG) tablet 3.125 mg  3.125 mg Oral BID w/meals     [START ON 12/1/2017] aspirin EC EC tablet 81 mg  81 mg Oral Daily     piperacillin-tazobactam  3.375 g Intravenous Q6H     vancomycin (VANCOCIN) IV  1,750 mg Intravenous Q24H     gabapentin  1,200 mg Oral At Bedtime       Data     Recent Labs  Lab 11/30/17  0747 11/29/17  1829 11/29/17  1649   WBC 7.8  --  11.0   HGB 12.5*  --  14.0   MCV 94  --  95   *  --  125*   INR 2.56* 2.53* Canceled, Test credited     --  131*   POTASSIUM 3.1*  --  3.9   CHLORIDE 99  --  96   CO2 26  --  26   BUN 25  --  23   CR 1.27*  --  1.23   ANIONGAP 9  --  9   BENNIE 8.3*  --  8.4*   GLC 95  --  105*   AST 18  --   --        Recent Results (from the past 24 hour(s))   US Extremity Non Vascular Left    Narrative    US EXTREMITY NON VASCULAR LEFT  11/29/2017 5:26 PM     HISTORY: Evaluate abscess and stump site.     COMPARISON: None.    FINDINGS: Edema throughout the distal left lower extremity stump in  the remaining tissues about the amputated left foot. There is a 6.6 x  3.1 x 1.6 cm complex fluid collection anteriorly. This could be a  seroma or abscess. There is considerable increased vascularity  within  the edematous tissues.      Impression    IMPRESSION:   1. Edematous hyperemic left lower extremity stump tissues.  2. Complex fluid collection as described. Abscess cannot be excluded.    MALKA DE MD

## 2017-11-30 NOTE — PLAN OF CARE
RECEIVING UNIT ED HANDOFF REVIEW    ED Nurse Handoff Report was reviewed by: Paulie Zamora on November 29, 2017 at 7:03 PM

## 2017-11-30 NOTE — PHARMACY-VANCOMYCIN DOSING SERVICE
Pharmacy Vancomycin Initial Note  Date of Service 2017  Patient's  1945  72 year old, male    Indication: Abscess    Current estimated CrCl = Estimated Creatinine Clearance: 65.5 mL/min (based on Cr of 1.23).    Creatinine for last 3 days  2017:  4:49 PM Creatinine 1.23 mg/dL    Recent Vancomycin Level(s) for last 3 days  No results found for requested labs within last 72 hours.      Vancomycin IV Administrations (past 72 hours)                   vancomycin (VANCOCIN) 1000 mg in dextrose 5% 200 mL PREMIX (mg) 1,000 mg Restarted 17     1,000 mg Started  183                Nephrotoxins and other renal medications (Future)    Start     Dose/Rate Route Frequency Ordered Stop    17 0600  vancomycin (VANCOCIN) 1,750 mg in NaCl 0.9 % 500 mL intermittent infusion      1,750 mg  over 2 Hours Intravenous EVERY 24 HOURS 17 2100  torsemide (DEMADEX) tablet 20 mg      20 mg Oral 2 TIMES DAILY 17 19517 0000  piperacillin-tazobactam (ZOSYN) infusion 3.375 g      3.375 g  100 mL/hr over 30 Minutes Intravenous EVERY 6 HOURS 17            Contrast Orders - past 72 hours     None                Plan:  1.  Start vancomycin  1750 mg IV q24h.   2.  Goal Trough Level: 10-15 mg/L (Goal on high end with abscess)  3.  Pharmacy will check trough levels as appropriate in 1-3 Days.    4. Serum creatinine levels will be ordered daily for the first week of therapy and at least twice weekly for subsequent weeks.    5. [place] method utilized to dose vancomycin therapy: Method 2    Tiana Barron

## 2017-11-30 NOTE — PLAN OF CARE
Problem: Skin and Soft Tissue Infection (Adult)  Goal: Signs and Symptoms of Listed Potential Problems Will be Absent, Minimized or Managed (Skin and Soft Tissue Infection)  Signs and symptoms of listed potential problems will be absent, minimized or managed by discharge/transition of care (reference Skin and Soft Tissue Infection (Adult) CPG).   Outcome: No Change  A&Ox4. C/o 8/10 neck pain, given Dilaudid (refusing PO w/ Tylenol d/t liver issues, to address in AM). Paul, other VSS. Left elbow abrasion, covered with mepilex. L BKA red, warm, marked-unchanged. Baseline neuropathy in extremities. New R PIV placed, SL. A1 with walker at baseline. Voiding in urinal. Pt appeared to rest comfortably throughout the night. Will continue to monitor. Plan: Possible I&D of L BKA after ortho consult in AM

## 2017-12-01 ENCOUNTER — ANESTHESIA EVENT (OUTPATIENT)
Dept: SURGERY | Facility: CLINIC | Age: 72
DRG: 464 | End: 2017-12-01
Payer: MEDICARE

## 2017-12-01 ENCOUNTER — ANESTHESIA (OUTPATIENT)
Dept: SURGERY | Facility: CLINIC | Age: 72
DRG: 464 | End: 2017-12-01
Payer: MEDICARE

## 2017-12-01 PROBLEM — L02.91 ABSCESS: Status: ACTIVE | Noted: 2017-12-01

## 2017-12-01 LAB
ANION GAP SERPL CALCULATED.3IONS-SCNC: 8 MMOL/L (ref 3–14)
BUN SERPL-MCNC: 26 MG/DL (ref 7–30)
CALCIUM SERPL-MCNC: 8.3 MG/DL (ref 8.5–10.1)
CHLORIDE SERPL-SCNC: 99 MMOL/L (ref 94–109)
CO2 SERPL-SCNC: 26 MMOL/L (ref 20–32)
CREAT SERPL-MCNC: 1.43 MG/DL (ref 0.66–1.25)
ERYTHROCYTE [DISTWIDTH] IN BLOOD BY AUTOMATED COUNT: 14.5 % (ref 10–15)
FACT X ACT/NOR PPP CHRO: 40 % (ref 70–130)
GFR SERPL CREATININE-BSD FRML MDRD: 48 ML/MIN/1.7M2
GLUCOSE SERPL-MCNC: 93 MG/DL (ref 70–99)
HCT VFR BLD AUTO: 35.4 % (ref 40–53)
HGB BLD-MCNC: 12.3 G/DL (ref 13.3–17.7)
INR PPP: 1.65 (ref 0.86–1.14)
INR PPP: 1.91 (ref 0.86–1.14)
MCH RBC QN AUTO: 32.8 PG (ref 26.5–33)
MCHC RBC AUTO-ENTMCNC: 34.7 G/DL (ref 31.5–36.5)
MCV RBC AUTO: 94 FL (ref 78–100)
PLATELET # BLD AUTO: 116 10E9/L (ref 150–450)
POTASSIUM SERPL-SCNC: 3.7 MMOL/L (ref 3.4–5.3)
RBC # BLD AUTO: 3.75 10E12/L (ref 4.4–5.9)
SODIUM SERPL-SCNC: 133 MMOL/L (ref 133–144)
WBC # BLD AUTO: 8.3 10E9/L (ref 4–11)

## 2017-12-01 PROCEDURE — 27210794 ZZH OR GENERAL SUPPLY STERILE: Performed by: ORTHOPAEDIC SURGERY

## 2017-12-01 PROCEDURE — 25000128 H RX IP 250 OP 636: Performed by: INTERNAL MEDICINE

## 2017-12-01 PROCEDURE — 36000052 ZZH SURGERY LEVEL 2 EA 15 ADDTL MIN: Performed by: ORTHOPAEDIC SURGERY

## 2017-12-01 PROCEDURE — 87070 CULTURE OTHR SPECIMN AEROBIC: CPT | Performed by: ORTHOPAEDIC SURGERY

## 2017-12-01 PROCEDURE — 87075 CULTR BACTERIA EXCEPT BLOOD: CPT | Performed by: ORTHOPAEDIC SURGERY

## 2017-12-01 PROCEDURE — 40000169 ZZH STATISTIC PRE-PROCEDURE ASSESSMENT I: Performed by: ORTHOPAEDIC SURGERY

## 2017-12-01 PROCEDURE — 25000125 ZZHC RX 250: Performed by: REGISTERED NURSE

## 2017-12-01 PROCEDURE — 80048 BASIC METABOLIC PNL TOTAL CA: CPT | Performed by: INTERNAL MEDICINE

## 2017-12-01 PROCEDURE — 25000128 H RX IP 250 OP 636: Performed by: ANESTHESIOLOGY

## 2017-12-01 PROCEDURE — A9270 NON-COVERED ITEM OR SERVICE: HCPCS | Mod: GY | Performed by: INTERNAL MEDICINE

## 2017-12-01 PROCEDURE — 87186 SC STD MICRODIL/AGAR DIL: CPT | Performed by: ORTHOPAEDIC SURGERY

## 2017-12-01 PROCEDURE — 0JBP0ZZ EXCISION OF LEFT LOWER LEG SUBCUTANEOUS TISSUE AND FASCIA, OPEN APPROACH: ICD-10-PCS | Performed by: ORTHOPAEDIC SURGERY

## 2017-12-01 PROCEDURE — 25000566 ZZH SEVOFLURANE, EA 15 MIN: Performed by: ORTHOPAEDIC SURGERY

## 2017-12-01 PROCEDURE — 36000050 ZZH SURGERY LEVEL 2 1ST 30 MIN: Performed by: ORTHOPAEDIC SURGERY

## 2017-12-01 PROCEDURE — 25000128 H RX IP 250 OP 636: Performed by: ORTHOPAEDIC SURGERY

## 2017-12-01 PROCEDURE — 71000012 ZZH RECOVERY PHASE 1 LEVEL 1 FIRST HR: Performed by: ORTHOPAEDIC SURGERY

## 2017-12-01 PROCEDURE — 36415 COLL VENOUS BLD VENIPUNCTURE: CPT | Performed by: INTERNAL MEDICINE

## 2017-12-01 PROCEDURE — 87205 SMEAR GRAM STAIN: CPT | Performed by: ORTHOPAEDIC SURGERY

## 2017-12-01 PROCEDURE — 87077 CULTURE AEROBIC IDENTIFY: CPT | Performed by: ORTHOPAEDIC SURGERY

## 2017-12-01 PROCEDURE — 25000132 ZZH RX MED GY IP 250 OP 250 PS 637: Mod: GY | Performed by: INTERNAL MEDICINE

## 2017-12-01 PROCEDURE — 25000128 H RX IP 250 OP 636: Performed by: REGISTERED NURSE

## 2017-12-01 PROCEDURE — 85610 PROTHROMBIN TIME: CPT | Performed by: INTERNAL MEDICINE

## 2017-12-01 PROCEDURE — 37000009 ZZH ANESTHESIA TECHNICAL FEE, EACH ADDTL 15 MIN: Performed by: ORTHOPAEDIC SURGERY

## 2017-12-01 PROCEDURE — 40000916 ZZH STATISTIC SITTER, NIGHT HOURS

## 2017-12-01 PROCEDURE — 82565 ASSAY OF CREATININE: CPT | Performed by: INTERNAL MEDICINE

## 2017-12-01 PROCEDURE — 25800025 ZZH RX 258: Performed by: ORTHOPAEDIC SURGERY

## 2017-12-01 PROCEDURE — 12000007 ZZH R&B INTERMEDIATE

## 2017-12-01 PROCEDURE — 85260 CLOT FACTOR X STUART-POWER: CPT | Performed by: INTERNAL MEDICINE

## 2017-12-01 PROCEDURE — 85027 COMPLETE CBC AUTOMATED: CPT | Performed by: INTERNAL MEDICINE

## 2017-12-01 PROCEDURE — 99233 SBSQ HOSP IP/OBS HIGH 50: CPT | Performed by: INTERNAL MEDICINE

## 2017-12-01 PROCEDURE — 37000008 ZZH ANESTHESIA TECHNICAL FEE, 1ST 30 MIN: Performed by: ORTHOPAEDIC SURGERY

## 2017-12-01 RX ORDER — HEPARIN SODIUM 5000 [USP'U]/.5ML
5000 INJECTION, SOLUTION INTRAVENOUS; SUBCUTANEOUS EVERY 8 HOURS
Status: DISCONTINUED | OUTPATIENT
Start: 2017-12-02 | End: 2017-12-01

## 2017-12-01 RX ORDER — LIDOCAINE 40 MG/G
CREAM TOPICAL
Status: DISCONTINUED | OUTPATIENT
Start: 2017-12-01 | End: 2017-12-04

## 2017-12-01 RX ORDER — HYDROXYZINE HYDROCHLORIDE 10 MG/1
10 TABLET, FILM COATED ORAL EVERY 6 HOURS PRN
Status: DISCONTINUED | OUTPATIENT
Start: 2017-12-01 | End: 2017-12-06

## 2017-12-01 RX ORDER — ONDANSETRON 2 MG/ML
4 INJECTION INTRAMUSCULAR; INTRAVENOUS EVERY 30 MIN PRN
Status: DISCONTINUED | OUTPATIENT
Start: 2017-12-01 | End: 2017-12-01 | Stop reason: HOSPADM

## 2017-12-01 RX ORDER — ETOMIDATE 2 MG/ML
INJECTION INTRAVENOUS PRN
Status: DISCONTINUED | OUTPATIENT
Start: 2017-12-01 | End: 2017-12-01

## 2017-12-01 RX ORDER — ONDANSETRON 4 MG/1
4 TABLET, ORALLY DISINTEGRATING ORAL EVERY 6 HOURS PRN
Status: DISCONTINUED | OUTPATIENT
Start: 2017-12-01 | End: 2017-12-06

## 2017-12-01 RX ORDER — SODIUM CHLORIDE, SODIUM LACTATE, POTASSIUM CHLORIDE, CALCIUM CHLORIDE 600; 310; 30; 20 MG/100ML; MG/100ML; MG/100ML; MG/100ML
INJECTION, SOLUTION INTRAVENOUS CONTINUOUS
Status: DISCONTINUED | OUTPATIENT
Start: 2017-12-01 | End: 2017-12-01 | Stop reason: HOSPADM

## 2017-12-01 RX ORDER — NALOXONE HYDROCHLORIDE 0.4 MG/ML
.1-.4 INJECTION, SOLUTION INTRAMUSCULAR; INTRAVENOUS; SUBCUTANEOUS
Status: DISCONTINUED | OUTPATIENT
Start: 2017-12-01 | End: 2017-12-04

## 2017-12-01 RX ORDER — ONDANSETRON 2 MG/ML
INJECTION INTRAMUSCULAR; INTRAVENOUS PRN
Status: DISCONTINUED | OUTPATIENT
Start: 2017-12-01 | End: 2017-12-01

## 2017-12-01 RX ORDER — LIDOCAINE HYDROCHLORIDE 20 MG/ML
INJECTION, SOLUTION INFILTRATION; PERINEURAL PRN
Status: DISCONTINUED | OUTPATIENT
Start: 2017-12-01 | End: 2017-12-01

## 2017-12-01 RX ORDER — ONDANSETRON 4 MG/1
4 TABLET, ORALLY DISINTEGRATING ORAL EVERY 30 MIN PRN
Status: DISCONTINUED | OUTPATIENT
Start: 2017-12-01 | End: 2017-12-01 | Stop reason: HOSPADM

## 2017-12-01 RX ORDER — OXYCODONE HYDROCHLORIDE 5 MG/1
5-10 TABLET ORAL EVERY 4 HOURS PRN
Status: DISCONTINUED | OUTPATIENT
Start: 2017-12-01 | End: 2017-12-01

## 2017-12-01 RX ORDER — HYDROMORPHONE HYDROCHLORIDE 1 MG/ML
.3-.5 INJECTION, SOLUTION INTRAMUSCULAR; INTRAVENOUS; SUBCUTANEOUS
Status: DISCONTINUED | OUTPATIENT
Start: 2017-12-01 | End: 2017-12-06

## 2017-12-01 RX ORDER — ONDANSETRON 2 MG/ML
4 INJECTION INTRAMUSCULAR; INTRAVENOUS EVERY 6 HOURS PRN
Status: DISCONTINUED | OUTPATIENT
Start: 2017-12-01 | End: 2017-12-06

## 2017-12-01 RX ORDER — FENTANYL CITRATE 50 UG/ML
25-50 INJECTION, SOLUTION INTRAMUSCULAR; INTRAVENOUS
Status: DISCONTINUED | OUTPATIENT
Start: 2017-12-01 | End: 2017-12-01 | Stop reason: HOSPADM

## 2017-12-01 RX ORDER — FENTANYL CITRATE 50 UG/ML
25-100 INJECTION, SOLUTION INTRAMUSCULAR; INTRAVENOUS
Status: DISCONTINUED | OUTPATIENT
Start: 2017-12-01 | End: 2017-12-01 | Stop reason: HOSPADM

## 2017-12-01 RX ORDER — CEFAZOLIN SODIUM 2 G/100ML
2 INJECTION, SOLUTION INTRAVENOUS
Status: COMPLETED | OUTPATIENT
Start: 2017-12-01 | End: 2017-12-01

## 2017-12-01 RX ORDER — HYDROMORPHONE HYDROCHLORIDE 1 MG/ML
.3-.5 INJECTION, SOLUTION INTRAMUSCULAR; INTRAVENOUS; SUBCUTANEOUS EVERY 5 MIN PRN
Status: DISCONTINUED | OUTPATIENT
Start: 2017-12-01 | End: 2017-12-01 | Stop reason: HOSPADM

## 2017-12-01 RX ORDER — AMOXICILLIN 250 MG
1-2 CAPSULE ORAL 2 TIMES DAILY
Status: DISCONTINUED | OUTPATIENT
Start: 2017-12-01 | End: 2017-12-06

## 2017-12-01 RX ORDER — FENTANYL CITRATE 50 UG/ML
INJECTION, SOLUTION INTRAMUSCULAR; INTRAVENOUS PRN
Status: DISCONTINUED | OUTPATIENT
Start: 2017-12-01 | End: 2017-12-01

## 2017-12-01 RX ORDER — LABETALOL HYDROCHLORIDE 5 MG/ML
10 INJECTION, SOLUTION INTRAVENOUS
Status: DISCONTINUED | OUTPATIENT
Start: 2017-12-01 | End: 2017-12-01 | Stop reason: HOSPADM

## 2017-12-01 RX ORDER — OXYCODONE HYDROCHLORIDE 5 MG/1
5-10 TABLET ORAL EVERY 4 HOURS PRN
Status: DISCONTINUED | OUTPATIENT
Start: 2017-12-01 | End: 2017-12-06

## 2017-12-01 RX ADMIN — GABAPENTIN 600 MG: 600 TABLET, FILM COATED ORAL at 08:45

## 2017-12-01 RX ADMIN — PHYTONADIONE 1 MG: 2 INJECTION, EMULSION INTRAMUSCULAR; INTRAVENOUS; SUBCUTANEOUS at 09:46

## 2017-12-01 RX ADMIN — FENTANYL CITRATE 50 MCG: 50 INJECTION, SOLUTION INTRAMUSCULAR; INTRAVENOUS at 17:51

## 2017-12-01 RX ADMIN — TAZOBACTAM SODIUM AND PIPERACILLIN SODIUM 3.38 G: 375; 3 INJECTION, SOLUTION INTRAVENOUS at 01:30

## 2017-12-01 RX ADMIN — TAZOBACTAM SODIUM AND PIPERACILLIN SODIUM 3.38 G: 375; 3 INJECTION, SOLUTION INTRAVENOUS at 16:39

## 2017-12-01 RX ADMIN — Medication 0.5 MG: at 20:17

## 2017-12-01 RX ADMIN — TAZOBACTAM SODIUM AND PIPERACILLIN SODIUM 3.38 G: 375; 3 INJECTION, SOLUTION INTRAVENOUS at 10:27

## 2017-12-01 RX ADMIN — SODIUM CHLORIDE, POTASSIUM CHLORIDE, SODIUM LACTATE AND CALCIUM CHLORIDE: 600; 310; 30; 20 INJECTION, SOLUTION INTRAVENOUS at 17:27

## 2017-12-01 RX ADMIN — HYDROMORPHONE HYDROCHLORIDE 0.5 MG: 1 INJECTION, SOLUTION INTRAMUSCULAR; INTRAVENOUS; SUBCUTANEOUS at 14:37

## 2017-12-01 RX ADMIN — LIDOCAINE HYDROCHLORIDE 40 MG: 20 INJECTION, SOLUTION INFILTRATION; PERINEURAL at 17:38

## 2017-12-01 RX ADMIN — HEPARIN SODIUM 1600 UNITS/HR: 10000 INJECTION, SOLUTION INTRAVENOUS at 22:57

## 2017-12-01 RX ADMIN — DEXMEDETOMIDINE HYDROCHLORIDE 16 MCG: 100 INJECTION, SOLUTION INTRAVENOUS at 17:36

## 2017-12-01 RX ADMIN — POTASSIUM CHLORIDE 10 MEQ: 750 TABLET, EXTENDED RELEASE ORAL at 08:45

## 2017-12-01 RX ADMIN — CARVEDILOL 3.12 MG: 3.12 TABLET, FILM COATED ORAL at 08:45

## 2017-12-01 RX ADMIN — MIDAZOLAM HYDROCHLORIDE 1 MG: 1 INJECTION, SOLUTION INTRAMUSCULAR; INTRAVENOUS at 17:30

## 2017-12-01 RX ADMIN — Medication 0.5 MG: at 18:57

## 2017-12-01 RX ADMIN — Medication 5000 UNITS: at 22:57

## 2017-12-01 RX ADMIN — HYDROMORPHONE HYDROCHLORIDE 0.5 MG: 1 INJECTION, SOLUTION INTRAMUSCULAR; INTRAVENOUS; SUBCUTANEOUS at 09:43

## 2017-12-01 RX ADMIN — ONDANSETRON 4 MG: 2 INJECTION INTRAMUSCULAR; INTRAVENOUS at 18:14

## 2017-12-01 RX ADMIN — TAZOBACTAM SODIUM AND PIPERACILLIN SODIUM 3.38 G: 375; 3 INJECTION, SOLUTION INTRAVENOUS at 21:34

## 2017-12-01 RX ADMIN — OXYCODONE HYDROCHLORIDE 5 MG: 5 TABLET ORAL at 03:52

## 2017-12-01 RX ADMIN — TORSEMIDE 20 MG: 20 TABLET ORAL at 08:45

## 2017-12-01 RX ADMIN — SUCCINYLCHOLINE CHLORIDE 100 MG: 20 INJECTION, SOLUTION INTRAMUSCULAR; INTRAVENOUS at 17:38

## 2017-12-01 RX ADMIN — MIDAZOLAM HYDROCHLORIDE 1 MG: 1 INJECTION, SOLUTION INTRAMUSCULAR; INTRAVENOUS at 17:40

## 2017-12-01 RX ADMIN — FENTANYL CITRATE 50 MCG: 50 INJECTION, SOLUTION INTRAMUSCULAR; INTRAVENOUS at 17:38

## 2017-12-01 RX ADMIN — GABAPENTIN 1200 MG: 300 CAPSULE ORAL at 21:39

## 2017-12-01 RX ADMIN — VANCOMYCIN HYDROCHLORIDE 1750 MG: 5 INJECTION, POWDER, LYOPHILIZED, FOR SOLUTION INTRAVENOUS at 06:30

## 2017-12-01 RX ADMIN — ETOMIDATE 18 MG: 2 INJECTION, SOLUTION INTRAVENOUS at 17:38

## 2017-12-01 RX ADMIN — CEFAZOLIN SODIUM 2 G: 2 INJECTION, SOLUTION INTRAVENOUS at 17:42

## 2017-12-01 NOTE — PROGRESS NOTES
[facility]    Hospitalist Progress Note    Assessment & Plan   Antonio Ramirez is a 72 year old male with complex PMHx including CAD with hx of 3V CABG in 2007, CHF, paroxysmal afib, thoracic aneurysm, stage III CKD, alcoholic cirrhosis, peripheral neuropathy, hx of MGUS, hx of prostate Ca, antiphospholipid Ab syndrome with hx of DVT/PE and IVC filter placement on chronic anticoagulation with warfarin and hx of L BKA in 2014 who was admitted on 11/29/2017 with pain and increased swelling in his stump with reported fever and chills with US showing a 6cm fluid collection.     Suspected L BKA Abscess/Cellulitis:  Reportedly saw a prosthetics specialist in [place] two weeks prior to admission who recommended a tighter prosthesis ruber sleeve, which the patient began wearing. 3-4d prior to admission he began to notice pain and swelling in his stump which continued to worsen. Endorsed fever and chills with Tmax 102 at home. Afebrile in ED, hemodynamically stable and WBC 11.0 on admission. Noted erythema of stump. US in ED showed a 6.6 c 3.1 x 1.6cm complex fluid collection concerning for abscess. Zosyn started on admission.   -- remains afebrile, blood cultures drawn on admission remain neg  -- cont IV Zosyn and Vanco  -- has prn oxycodone and IV dilaudid for pain control  -- orthopedic surgery consulted, plan is for I&D today  -- no additional cardiac workup needed prior to proceeding with surgery      Anticardiolipin Ab syndrome   With hx of DVT/PE. Has IVC filter in place and is on chronic anticoagulation with warfarin (monitors with chromogenic factor X levels). INR 2.53 on admission. Given 5mg IV Vitamin K on 11/29 PM dt possible need for I&D of abscess.   -- per initial labs on 11/30, chromogenic factor X levels appear to be correlating with INR  -- needs INR reversed for I&D -- given 5mg Vitamin K on 11/29 PM and an additional 2mg IV Vitamin K on 11/30 PM -- INR 1.91 this morning, have ordered an  additional 1mg IV Vitamin K  -- given high risk for clot, recommend anticoagulation be resumed postop later today (resume warfarin and bridge with heparin), will need to discuss with ortho    CAD with hx of 3V CABG in 2007  Chronic Systolic CHF  Paroxysmal afib  Mild AS / Moderate MR  Hypertension / Hyperlipidemia  Echo through [facility] in 5/2017 showed EF 45-50% and reduced RVSF with elevated pulmonary pressures  Well managed with regimen of Coreg 3.125mg BID, aspirin 81mg daily, Repatha subQ every 2 wks (hx of statin intolerance) and torsemide 20mg BID.   -- remains euvolemic, no recent c/o chest pain or dyspnea with exertion; reportedly had stress test in [place] in the past year that was neg for ischemia, EF reportedly 47%  -- conts on Coreg, aspirin and torsemide    Stage III CKD:  Cr 1.25 on 11/1/17, had been 1.3 - 1.6 earlier this year. Cr 1.23 on admission  -- Cr with slow upward trend this stay -- 1.23 -- 1.27 -- 1.43 today  -- already received todays dose of torsemide  -- monitor BMPs, encourage po intake, will need to hold torsemide and consider IVFs if Cr conts trending up    Alcoholic cirrhosis  Reportedly drinks 2 drinks per day. Encouraged to stop drinking.   AST nl this stay.    Peripheral Neuropathy:  EtOH use thought to be contributing.   Sx stable on gabapentin which has been continued    Hyponatremia: Resolved  Hypokalemia: Resolved  Na 131 on admission. Normalized with po intake.  Has scheduled K replacement while on diuretics, K replacement protocol also ordered    Thrombocytopenia:  Chronic, likely related to EtOH use. Baseline appears to be in 120s. Likely exacerbated by infection as above    FEN: no IVFs, lytes as above, cardiac diet  DVT Prophylaxis: warfarin  Code Status: Full Code    Disposition: Pending postop course and plan for abx -- likely 2-3d still.     Omaira Coleman    Interval History   Patient seen this morning. Endorses ongoing pain in LLE stump. No fevers. Breathing  okay. No complaints.    -Data reviewed today: I reviewed all new labs and imaging results over the last 24 hours. I personally reviewed no images or EKG's today.    Physical Exam   Temp: 98.7  F (37.1  C) Temp src: Oral BP: 121/62 Pulse: 68 Heart Rate: 60 Resp: 16 SpO2: 92 % O2 Device: None (Room air)    Vitals:    11/29/17 1629   Weight: 99.8 kg (220 lb)     Vital Signs with Ranges  Temp:  [98.3  F (36.8  C)-99.6  F (37.6  C)] 98.7  F (37.1  C)  Pulse:  [66-68] 68  Heart Rate:  [54-60] 60  Resp:  [16-17] 16  BP: (105-121)/(58-62) 121/62  SpO2:  [92 %-96 %] 92 %  I/O last 3 completed shifts:  In: 849 [P.O.:849]  Out: 721 [Urine:721]    Constitutional: Resting comfortably, alert and conversing appropriately, NAD  Respiratory: CTAB, no wheeze/rales/rhonchi, no increased work of breathing  Cardiovascular: HRRR w/++SM throughout precordium, no RLE edema  GI: S, NT, ND, +BS  Skin/Integumen: warmth and erythema of LLE stump with area of fluctuance on anterior aspect of stuump, erythema and warmth more pronounced and confluent than yesterday, +TTP  Other:      Medications        phytonadione  1 mg Intravenous Once     sodium chloride (PF)  3 mL Intracatheter Q8H     docusate sodium  100 mg Oral BID     torsemide  20 mg Oral BID     potassium chloride SA  10 mEq Oral Daily     gabapentin (NEURONTIN) tablet 600 mg  600 mg Oral BID     carvedilol (COREG) tablet 3.125 mg  3.125 mg Oral BID w/meals     aspirin EC EC tablet 81 mg  81 mg Oral Daily     piperacillin-tazobactam  3.375 g Intravenous Q6H     vancomycin (VANCOCIN) IV  1,750 mg Intravenous Q24H     gabapentin  1,200 mg Oral At Bedtime       Data     Recent Labs  Lab 12/01/17  0712 11/30/17  1738 11/30/17  0747 11/29/17  1829 11/29/17  1649   WBC 8.3  --  7.8  --  11.0   HGB 12.3*  --  12.5*  --  14.0   MCV 94  --  94  --  95   *  --  106*  --  125*   INR 1.91*  --  2.56* 2.53* Canceled, Test credited     --  134  --  131*   POTASSIUM 3.7 3.7 3.1*  --  3.9    CHLORIDE 99  --  99  --  96   CO2 26  --  26  --  26   BUN 26  --  25  --  23   CR 1.43*  --  1.27*  --  1.23   ANIONGAP 8  --  9  --  9   BENNIE 8.3*  --  8.3*  --  8.4*   GLC 93  --  95  --  105*   AST  --   --  18  --   --        No results found for this or any previous visit (from the past 24 hour(s)).

## 2017-12-01 NOTE — ANESTHESIA PREPROCEDURE EVALUATION
Anesthesia Evaluation     . Pt has had prior anesthetic.     History of anesthetic complications (crazy after stump revision, likely pain meds)          ROS/MED HX    ENT/Pulmonary: Comment: Hx of bilateral pulm emboli, on anticoagul;atiopnm     (-) sleep apnea   Neurologic: Comment: Balance problem    (+)neuropathy     Cardiovascular: Comment: Le thrombophlebiits    Echo 9/2016: EF 55%, mild ms, mod mr.  Improved ef since previous echo    (+) hypertension-range: well controlled, -CAD, -past MI (MI, s/p triple bypass, no cardiac issues since per patient),-. Taking blood thinners : . CHF Last EF: 45-50 date: 2017 . fainting (syncope). :. valvular problems/murmurs type: AS and MR . Previous cardiac testing Echodate:5/2017results:EF 45-50%, reduced RVSF, elevated pulmonary pressures (per notes, but no description of severity)Stress Testdate:2016 results:Negative for ischemiaECG reviewed date:11/2017 results:Afib date: results:          METS/Exercise Tolerance:     Hematologic: Comments: Anticardiolipin antibody    (+) History of blood clots -      Musculoskeletal:         GI/Hepatic:     (+) GERD (hx of it, but patient denies symptoms.  Patient denies) hiatal hernia, liver disease (etohc cirrhosis, with portal hypertension.  Per patient has not had ascities in the past year),       Renal/Genitourinary: Comment: Urinary stricture, stones    (+) chronic renal disease (stage 3), type: CRI,       Endo:         Psychiatric:         Infectious Disease:         Malignancy:         Other:                     Physical Exam  Normal systems: pulmonary and dental    Airway   Mallampati: II  TM distance: >3 FB  Neck ROM: full    Dental     Cardiovascular   Rhythm and rate: regular  (+) murmur       Pulmonary                         Anesthesia Plan      History & Physical Review  History and physical reviewed and following examination; no interval change.    ASA Status:  4 .    NPO Status:  > 8 hours    Plan for General, RSI and  "ETT with Intravenous induction. Maintenance will be Balanced.    PONV prophylaxis:  Ondansetron (or other 5HT-3)  Additional equipment: Videolaryngoscope Unable to perform spinal or PNB given INR    RSI with etomidate and glidescope  History of \"crazy after previous stump revision\" however he says he has had 10 surgeries since without problems.  Will try to minimize pain medication      Postoperative Care  Postoperative pain management:  IV analgesics.      Consents  Anesthetic plan, risks, benefits and alternatives discussed with:  Patient and Spouse..                          .  "

## 2017-12-01 NOTE — PLAN OF CARE
Problem: Patient Care Overview  Goal: Plan of Care/Patient Progress Review  PT A&O vss on RA.  Max pain 10/10 managed with IV dilaudid d/t NPO status. Otherwise takes Oxy. PT easily frustrated with care  I&D of L BKA and likely hip arthroplasty 1600 todsy. L BKA red, warm, swollen. marked-unchanged. Baseline neuropathy in extremities. Baseline phantom discomfort at times in LLE.   Voiding adequately using bedside urinal. NPO since midnight, plan for I&D of L BKA and likely hip arthroplasty 1600 today. IRN 1.92. Vit K given, re-check is at 1500.  Up with A2. Continue to monitor.

## 2017-12-01 NOTE — CONSULTS
ORTHOPEDIC CONSULTATION      REASON FOR CONSULTATION:  I was asked to provide orthopedic consultation for Antonio Ramirez, a 72-year-old male who was admitted with a subcutaneous abscess of the left BKA stump.  He reports that over the weekend he began developing redness and swelling about the distal aspect of his BKA amputation site.  Recently he had his socket on his prosthesis changed and he reports that he developed a pressure spot, and subsequent to that he has developed a swollen, red, left BKA stump site.  He has multiple medical problems.  You can refer to his admission history and physical for the specifics of his past medical history and medication.  He is currently on Coumadin and Dilaudid along with gabapentin and carvedilol.      REVIEW OF SYSTEMS:  He reports that he had a fever up to 101 yesterday.  He denies recent chest pain or shortness of breath.  He denies any nausea, vomiting or diarrhea.      PHYSICAL EXAMINATION:  He is a 72-year-old male who is alert and oriented x3.  He is pleasant, in no acute distress.  Painless range of motion of the upper extremities.  Examination of his left below-knee amputation site demonstrates fluctuance at the anterior aspect of the stump and erythema that extends to just below the knee, both anteriorly and posteriorly.  He is able to flex and extend the knee without difficulty.  No x-rays available at the time of this dictation.      ASSESSMENT:  Subcutaneous abscess, left BKA stump.  The patient is currently on IV antibiotics.        PLAN:  The plan is to proceed with I&D of this on Friday morning.  His INR is 2.56.  The plan will be to reverse that this evening.  He will need to be cleared for surgery by Medicine.         AUDRA SMITH MD             D: 2017 19:16   T: 2017 21:03   MT: [id number]      Name:     ANTONIO RAMIREZ   MRN:      [id number]        Account:       [id number]   :      1945           Consult Date:  2017       Document: [id number]

## 2017-12-01 NOTE — PLAN OF CARE
Problem: Patient Care Overview  Goal: Plan of Care/Patient Progress Review  Outcome: No Change  Left BKA stump redness and edema are unchanged; elevated on pillows.  Patient c/o stump pain relieved with Oxycodone.  HR yelena at times o/w VSS.  K+ was replaced - results are pending.  Up with assist of 2.  Orthopedic Surgery has yet to see the patient.

## 2017-12-01 NOTE — PLAN OF CARE
Problem: Patient Care Overview  Goal: Plan of Care/Patient Progress Review  Outcome: No Change  A&Ox3, forgetful.  HR Bradycardic, irregular apical pulse, murmur. Elbow dressing CDI, scant dried drainage. Scab on R foot, L knee, no drainage.  Left BKA redness did not extend past markings. Tolerating low fat, low sodium diet. IV saline locked. INR 2.56. Vit K given. Recheck ordered for am.  Denies pain. Not OOB this shift, ambulates A2 with walker/gb. Voiding in urinal. Plan for I&D of Left BKA tomorrow.

## 2017-12-01 NOTE — PLAN OF CARE
Problem: Patient Care Overview  Goal: Plan of Care/Patient Progress Review  Outcome: No Change  PT alert  Forgetful impulsive with cares.  pain control with po oxy irregular apical pulse, murmur noted. Elbow dressing CDI, scant dried drainage. Scab on R foot, L knee, no drainage.  Left BKA redness did not extend past markings. Chlor hexedine wipes done per protocol.  NPO from 00.00  IV saline locked. INR 2.56. Vit K given.  Frequency noted bladder scan performed this shift  no intervention needed at this time. Plan for I&D of Left BKA today.

## 2017-12-02 ENCOUNTER — APPOINTMENT (OUTPATIENT)
Dept: PHYSICAL THERAPY | Facility: CLINIC | Age: 72
DRG: 464 | End: 2017-12-02
Attending: ORTHOPAEDIC SURGERY
Payer: MEDICARE

## 2017-12-02 LAB
CREAT SERPL-MCNC: 1.48 MG/DL (ref 0.66–1.25)
FACT X ACT/NOR PPP CHRO: 45 % (ref 70–130)
GFR SERPL CREATININE-BSD FRML MDRD: 46 ML/MIN/1.7M2
GLUCOSE SERPL-MCNC: 132 MG/DL (ref 70–99)
GRAM STN SPEC: ABNORMAL
HGB BLD-MCNC: 11.1 G/DL (ref 13.3–17.7)
LMWH PPP CHRO-ACNC: 0.38 IU/ML
LMWH PPP CHRO-ACNC: 0.46 IU/ML
Lab: ABNORMAL
SPECIMEN SOURCE: ABNORMAL

## 2017-12-02 PROCEDURE — 99207 ZZC CDG-MDM COMPONENT: MEETS MODERATE - UP CODED: CPT | Performed by: INTERNAL MEDICINE

## 2017-12-02 PROCEDURE — 85018 HEMOGLOBIN: CPT | Performed by: INTERNAL MEDICINE

## 2017-12-02 PROCEDURE — 82947 ASSAY GLUCOSE BLOOD QUANT: CPT | Performed by: INTERNAL MEDICINE

## 2017-12-02 PROCEDURE — 97161 PT EVAL LOW COMPLEX 20 MIN: CPT | Mod: GP

## 2017-12-02 PROCEDURE — 85520 HEPARIN ASSAY: CPT | Performed by: INTERNAL MEDICINE

## 2017-12-02 PROCEDURE — 40000193 ZZH STATISTIC PT WARD VISIT

## 2017-12-02 PROCEDURE — 85260 CLOT FACTOR X STUART-POWER: CPT | Performed by: INTERNAL MEDICINE

## 2017-12-02 PROCEDURE — 12000007 ZZH R&B INTERMEDIATE

## 2017-12-02 PROCEDURE — 82565 ASSAY OF CREATININE: CPT | Performed by: INTERNAL MEDICINE

## 2017-12-02 PROCEDURE — 36415 COLL VENOUS BLD VENIPUNCTURE: CPT | Performed by: INTERNAL MEDICINE

## 2017-12-02 PROCEDURE — A9270 NON-COVERED ITEM OR SERVICE: HCPCS | Mod: GY | Performed by: ORTHOPAEDIC SURGERY

## 2017-12-02 PROCEDURE — 25000128 H RX IP 250 OP 636: Performed by: INTERNAL MEDICINE

## 2017-12-02 PROCEDURE — 25000128 H RX IP 250 OP 636: Performed by: ORTHOPAEDIC SURGERY

## 2017-12-02 PROCEDURE — A9270 NON-COVERED ITEM OR SERVICE: HCPCS | Mod: GY | Performed by: INTERNAL MEDICINE

## 2017-12-02 PROCEDURE — 25000132 ZZH RX MED GY IP 250 OP 250 PS 637: Mod: GY | Performed by: ORTHOPAEDIC SURGERY

## 2017-12-02 PROCEDURE — 97530 THERAPEUTIC ACTIVITIES: CPT | Mod: GP

## 2017-12-02 PROCEDURE — 25000132 ZZH RX MED GY IP 250 OP 250 PS 637: Mod: GY | Performed by: INTERNAL MEDICINE

## 2017-12-02 PROCEDURE — 40000914 ZZH STATISTIC SITTER, DAY HOURS

## 2017-12-02 PROCEDURE — 99233 SBSQ HOSP IP/OBS HIGH 50: CPT | Performed by: INTERNAL MEDICINE

## 2017-12-02 RX ADMIN — OXYCODONE HYDROCHLORIDE 5 MG: 5 TABLET ORAL at 22:15

## 2017-12-02 RX ADMIN — CARVEDILOL 3.12 MG: 3.12 TABLET, FILM COATED ORAL at 08:51

## 2017-12-02 RX ADMIN — SENNOSIDES AND DOCUSATE SODIUM 1 TABLET: 8.6; 5 TABLET ORAL at 22:15

## 2017-12-02 RX ADMIN — OXYCODONE HYDROCHLORIDE 5 MG: 5 TABLET ORAL at 17:34

## 2017-12-02 RX ADMIN — OXYCODONE HYDROCHLORIDE 5 MG: 5 TABLET ORAL at 10:13

## 2017-12-02 RX ADMIN — DOCUSATE SODIUM 100 MG: 100 CAPSULE, LIQUID FILLED ORAL at 22:15

## 2017-12-02 RX ADMIN — CARVEDILOL 3.12 MG: 3.12 TABLET, FILM COATED ORAL at 17:34

## 2017-12-02 RX ADMIN — POTASSIUM CHLORIDE 10 MEQ: 750 TABLET, EXTENDED RELEASE ORAL at 08:51

## 2017-12-02 RX ADMIN — ASPIRIN 81 MG: 81 TABLET, COATED ORAL at 22:14

## 2017-12-02 RX ADMIN — VANCOMYCIN HYDROCHLORIDE 1750 MG: 5 INJECTION, POWDER, LYOPHILIZED, FOR SOLUTION INTRAVENOUS at 07:52

## 2017-12-02 RX ADMIN — GABAPENTIN 600 MG: 600 TABLET, FILM COATED ORAL at 16:10

## 2017-12-02 RX ADMIN — TAZOBACTAM SODIUM AND PIPERACILLIN SODIUM 3.38 G: 375; 3 INJECTION, SOLUTION INTRAVENOUS at 16:10

## 2017-12-02 RX ADMIN — GABAPENTIN 1200 MG: 300 CAPSULE ORAL at 22:14

## 2017-12-02 RX ADMIN — TAZOBACTAM SODIUM AND PIPERACILLIN SODIUM 3.38 G: 375; 3 INJECTION, SOLUTION INTRAVENOUS at 22:14

## 2017-12-02 RX ADMIN — TAZOBACTAM SODIUM AND PIPERACILLIN SODIUM 3.38 G: 375; 3 INJECTION, SOLUTION INTRAVENOUS at 04:47

## 2017-12-02 RX ADMIN — GABAPENTIN 600 MG: 600 TABLET, FILM COATED ORAL at 08:51

## 2017-12-02 RX ADMIN — Medication 1 MG: at 22:15

## 2017-12-02 RX ADMIN — HEPARIN SODIUM 1600 UNITS/HR: 10000 INJECTION, SOLUTION INTRAVENOUS at 10:13

## 2017-12-02 RX ADMIN — OXYCODONE HYDROCHLORIDE 5 MG: 5 TABLET ORAL at 04:54

## 2017-12-02 RX ADMIN — TAZOBACTAM SODIUM AND PIPERACILLIN SODIUM 3.38 G: 375; 3 INJECTION, SOLUTION INTRAVENOUS at 09:28

## 2017-12-02 NOTE — PROGRESS NOTES
"Patient wants capnography off, nurse explained the importance of monitoring, patient wants to eat. Patient's wife in room.    Patient did not allowed nurse to place back capnography monitoring until 2132.    2200- patient removed finger monitoring for pulse oxymetry. Removed capnography canula. Refused to have oxygen canula   Nurse called wife so that she tries to convince patient. Wife unable to convince patient. Patient threatened to pulled things off if he hears one beep. Pulse oxymetry goes down below 89% when patient starts to fall asleep. Patient clearly needs oxygen. There's an order to start heparin gtt. Nurse does not want patient to pull IV out.     Earlier, wife told this nurse that patient gets confused after surgeries. In fact, patient has been getting increasingly confused. When asked \" where are you?\", he answers,  \"at a hotel\".     This nurse is concerned that oxygen level will go down without supplemental oxygen. Also, this nurse is concerned that patient will pull IV out if pulse oxymetry starts to alarm.        "

## 2017-12-02 NOTE — PROGRESS NOTES
12/02/17 1030   Quick Adds   Type of Visit Initial PT Evaluation   Living Environment   Lives With spouse   Living Arrangements apartment   Home Accessibility no concerns   Number of Stairs to Enter Home 0   Number of Stairs Within Home 0   Transportation Available family or friend will provide   Self-Care   Dominant Hand ambidextrous   Regular Exercise no   Equipment Currently Used at Home walker, rolling   Functional Level Prior   Ambulation 0-->independent   Transferring 0-->independent   Toileting 0-->independent   Bathing 0-->independent   Dressing 0-->independent   Eating 0-->independent   Communication 0-->understands/communicates without difficulty   Swallowing 0-->swallows foods/liquids without difficulty   Cognition 1 - attention or memory deficits   Fall history within last six months yes   Number of times patient has fallen within last six months 1   Which of the above functional risks had a recent onset or change? ambulation;transferring;toileting;bathing;dressing;fall history   General Information   Onset of Illness/Injury or Date of Surgery - Date 11/29/17   Referring Physician Saima Howard MD   Patient/Family Goals Statement return home with spouse   Pertinent History of Current Problem (include personal factors and/or comorbidities that impact the POC) Patient admitted to ED on 11/29/17 due to L stump edema/wound.  Underwent I & D on 12/1/17.  Wound vac continuously per MD.   Precautions/Limitations fall precautions   Cognitive Status Examination   Orientation person;place;time   Level of Consciousness alert   Follows Commands and Answers Questions able to follow multistep instructions   Personal Safety and Judgment intact   Memory intact   Range of Motion (ROM)   ROM Comment WNL   Strength   Strength Comments Appears at least 3+/5 as patient able to demonstrate ability to tolerate functional transfers withcarlu KARIE Irvin   Bed Mobility   Bed Mobility Comments Supine <> sit CGA   Transfer  "Skills   Transfer Comments sit <> stand using FWW and MIN/MOD A   Gait   Gait Comments not assessed   Balance   Balance Comments fair standing balance using FWW   General Therapy Interventions   Planned Therapy Interventions transfer training;home program guidelines;progressive activity/exercise;bed mobility training   Clinical Impression   Criteria for Skilled Therapeutic Intervention yes, treatment indicated   PT Diagnosis gait difficulty due to L LE wound s/p I & D   Influenced by the following impairments weakness, pain, imbalance   Functional limitations due to impairments difficulty with transfers, gait and be mobility   Clinical Presentation Stable/Uncomplicated   Clinical Presentation Rationale Patient reports previously being I with ADLs/IADLs/ambulation and functional mobility. Currently unable to ambulate and requires Min/Mod A for transfers.   Clinical Decision Making (Complexity) Low complexity   Therapy Frequency` daily   Predicted Duration of Therapy Intervention (days/wks) 3-4 days   Anticipated Discharge Disposition Home   Risk & Benefits of therapy have been explained Yes   Patient, Family & other staff in agreement with plan of care Yes   [facility] AM-PAC  \"6 Clicks\" V.2 Basic Mobility Inpatient Short Form   1. Turning from your back to your side while in a flat bed without using bedrails? 3 - A Little   2. Moving from lying on your back to sitting on the side of a flat bed without using bedrails? 3 - A Little   3. Moving to and from a bed to a chair (including a wheelchair)? 2 - A Lot   4. Standing up from a chair using your arms (e.g., wheelchair, or bedside chair)? 2 - A Lot   5. To walk in hospital room? 1 - Total   6. Climbing 3-5 steps with a railing? 1 - Total   Basic Mobility Raw Score (Score out of 24.Lower scores equate to lower levels of function) 12   Total Evaluation Time   Total Evaluation Time (Minutes) 15     "

## 2017-12-02 NOTE — PLAN OF CARE
Problem: Patient Care Overview  Goal: Plan of Care/Patient Progress Review  Patient arrived to the floor at 2000. Oxygen level ranges 84%-94 at room air. Wound vac at continuous suction.  Patient confused, needs constant redirection, completely refuses pulse oxymetry and supplemental oxygen. Gets angry when hears beeping from pulse oxymetry. He called  to try to get Dr Howard's home number. He wanted to speak with Dr Howard to cancel capnography monitoring. Dressing cdi. Sitter at bedside.

## 2017-12-02 NOTE — PROGRESS NOTES
"Pt very condescending to this RN.  Would make comments \"You don't make any sense.  You need to work on your grammar.\"  Pt was also insulting to Dr. Howard.  Charge RN notified.    "

## 2017-12-02 NOTE — OP NOTE
DATE OF PROCEDURE:  12/01/2017      PREOPERATIVE DIAGNOSIS:  Subcutaneous abscess, left below-knee amputation stump.      POSTOPERATIVE DIAGNOSIS:  Subcutaneous abscess, left below-knee amputation stump.      PROCEDURE:  I&D of the left leg below-knee amputation stump and placement of wound VAC.      ASSISTANT:   AUDI MCDANIEL      INDICATIONS FOR PROCEDURE:  Antonio Ramirez is a 72-year-old male who several years ago had a below-knee amputation done on the left side.  This was done elsewhere.  He had some difficulty with fitting of the prosthetic and over the last week or so he has developed cellulitis and a fluid collection at the distal aspect of his below-knee amputation stump.  I saw the patient yesterday in the emergency room.  He was admitted and placed on IV antibiotics.  I discussed treatment options explained to him the risks, benefits, potential complications of the above stated procedure and warned him that he may need to have a revision of the amputation and may even need to proceed with the above-knee amputation if this does not heal.  However, I told him that we would make every attempt to preserve the recurrent bone on this tibia and try to treat this procedure with minimal bone loss.  He voiced understanding and wanted to proceed.      ANESTHESIA:  General.      DESCRIPTION OF PROCEDURE:  The patient was taken to the operating room and placed on the operating table in the supine position.  After adequate induction of a general anesthetic, a pneumatic tourniquet was applied to the left thigh.  The patient was given 2 grams Ancef for infection prophylaxis given 1 hour prior to incision.  We then performed a sterile prep and drape of the left leg and left lower extremity.  After sterile prep and drape, we opened his old surgical scar and had return of bloody purulent fluid.  We then used a rongeur and curet to curet out the abscess.  We did have a fair amount of brisk bleeding so we elevated the  limb, then elevated the tourniquet to 250 mmHg, it was up for approximately 10 minutes.  After we were done debriding the abscess we irrigated using 3 liters of antibiotic saline.  We then applied the wound VAC in standard fashion.  We had a good seal  upon completion of this.  The patient tolerated the operative procedure.  There were no intraoperative complications.  The patient was sent to [facility] in stable condition.  Plan will be to keep the wound VAC on continuous suction the next several days.  He may need another repeat I&D, we will check that out on Monday or Tuesday.  He will be in the hospital on IV antibiotics and further recommendations of the Infectious Disease Service. He will also need to be anticoagulated with heparin drip due to his history of DVT and pulmonary emboli.  This was the recommendation of Dr. Coleman, the admitting hospitalist.         AUDRA SMITH MD             D: 2017 18:33   T: 2017 08:35   MT: [id number]      Name:     GALILEA LUGO   MRN:      [id number]        Account:        [id number]   :      1945           Procedure Date: 2017      Document: [id number]

## 2017-12-02 NOTE — PLAN OF CARE
Problem: Patient Care Overview  Goal: Plan of Care/Patient Progress Review  Outcome: Improving  Alert and oriented x4 . Communicate needs . Pain managed with oxycodone . Wound vac in place , no leakage noted . Frequent blockage alarms noted . MD aware . Heparin infusing 1600 units / hour . Good appetite . Vital signs stable .

## 2017-12-02 NOTE — ANESTHESIA POSTPROCEDURE EVALUATION
Patient: Antonio Ramirez    Procedure(s):  INCISION AND DRAINAGE OF LEFT BELOW KNEE AMPUTATION ABSCESS, WOUND VAC PLACEMENT - Wound Class: II-Clean Contaminated    Diagnosis:ABSCESS BKA STUMP  Diagnosis Additional Information: No value filed.    Anesthesia Type:  General, RSI, ETT    Note:  Anesthesia Post Evaluation    Patient location during evaluation: PACU  Patient participation: Able to fully participate in evaluation  Level of consciousness: awake and alert  Pain management: adequate  Airway patency: patent  Cardiovascular status: acceptable, hemodynamically stable and blood pressure returned to baseline  Respiratory status: acceptable  Hydration status: acceptable  PONV: none     Anesthetic complications: None          Last vitals:  Vitals:    12/01/17 1857 12/01/17 1900 12/01/17 1910   BP:  103/67 118/65   Pulse:      Resp: 19 19 12   Temp:      SpO2:  97% 97%         Electronically Signed By: Rose Marie Carreon MD  December 1, 2017  7:20 PM

## 2017-12-02 NOTE — PROVIDER NOTIFICATION
.MD Notification    Notified Person:  MD    Notified Persons Name: tono     Notification Date/Time: 12/2 /17    Notification Interaction:  Talked with Physician yes     Purpose of Notification: wound vac blockage alarms     Orders Received: consult wound nurse    Comments:

## 2017-12-02 NOTE — PROVIDER NOTIFICATION
Pagejohn Casper regarding patient confused,  pulling lines,  refusing oxygen and pulse oximetry. Waiting for orders.

## 2017-12-02 NOTE — ANESTHESIA CARE TRANSFER NOTE
Patient: Antonio Ramirez    Procedure(s):  INCISION AND DRAINAGE OF LEFT BELOW KNEE AMPUTATION ABSCESS, WOUND VAC PLACEMENT - Wound Class: II-Clean Contaminated    Diagnosis: ABSCESS BKA STUMP  Diagnosis Additional Information: No value filed.    Anesthesia Type:   General, RSI, ETT     Note:  Airway :Face Mask  Patient transferred to:PACU  Comments: Transferred to PACU, spontaneous respirations, 10L oxygen via facemask.  All monitors and alarms on and functioning, VSS.  Patient awake, comfortable.  Report to PACU RN.Handoff Report: Identifed the Patient, Identified the Reponsible Provider, Reviewed the pertinent medical history, Discussed the surgical course, Reviewed Intra-OP anesthesia mangement and issues during anesthesia, Set expectations for post-procedure period and Allowed opportunity for questions and acknowledgement of understanding      Vitals: (Last set prior to Anesthesia Care Transfer)    CRNA VITALS  12/1/2017 1803 - 12/1/2017 1840      12/1/2017             Pulse: 71    Ht Rate: 75    SpO2: 98 %    Resp Rate (set): 10                Electronically Signed By: DIPAK Heard CRNA  December 1, 2017  6:40 PM

## 2017-12-02 NOTE — TREATMENT PLAN
Per Dr. Howard instructions, pharmacy is to be consulted to dose heparin drip.   Patient is to be bridged with heparin. He is on chronic coumadin.   Infectious disease team has been consulted to assess abscess and proper antibiotic administration  Medical team will continue to manage medical problems.

## 2017-12-02 NOTE — PROGRESS NOTES
[facility]    Hospitalist Progress Note    Assessment & Plan   Antonio Ramirez is a 72 year old male with complex PMHx including CAD with hx of 3V CABG in 2007, CHF, paroxysmal afib, thoracic aneurysm, stage III CKD, alcoholic cirrhosis, peripheral neuropathy, hx of MGUS, hx of prostate Ca, antiphospholipid Ab syndrome with hx of DVT/PE and IVC filter placement on chronic anticoagulation with warfarin and hx of L BKA in 2014 who was admitted on 11/29/2017 with pain and increased swelling in his stump with reported fever and chills with US showing a 6cm fluid collection.     Suspected L BKA Abscess/Cellulitis, s/p I&D on 12/1/17: POD #1  Reportedly saw a prosthetics specialist in [place] two weeks prior to admission who recommended a tighter prosthesis ruber sleeve, which the patient began wearing. 3-4d prior to admission he began to notice pain and swelling in his stump which continued to worsen. Endorsed fever and chills with Tmax 102 at home. Afebrile in ED, hemodynamically stable and WBC 11.0 on admission. Noted erythema of stump. US in ED showed a 6.6 c 3.1 x 1.6cm complex fluid collection concerning for abscess. Vanco and Zosyn started on admission.     -- see by ortho, underwent I&D on 12/1 with placement of wound vac, will need repeat I&D in the coming days  -- remains afebrile, blood cultures drawn on admission remain neg, intraop cultures growing GPC  -- ID following, recommend cont Zosyn alone for now  -- has prn oxycodone and IV dilaudid for pain control    Anticardiolipin Ab syndrome   With hx of DVT/PE. Has IVC filter in place and is on chronic anticoagulation with warfarin (monitors with chromogenic factor X levels). INR 2.53 on admission. Per initial labs, chromogenic factor X levels appear to be correlating with INR. INR reversed for I&D -- given 5mg Vitamin K on 11/29 PM, 2mg IV Vitamin K on 11/30 PM and an additional 1mg vitamin K on 12/1 AM  -- given high risk for clot, placed on  heparin gtt on POD #0  -- holding warfarin for now given plans to go back to OR in the coming days, cont on heparin gtt for now    CAD with hx of 3V CABG in 2007  Chronic Systolic CHF  Paroxysmal afib  Mild AS / Moderate MR  Hypertension / Hyperlipidemia  Echo through [facility] in 5/2017 showed EF 45-50% and reduced RVSF with elevated pulmonary pressures. Euvolemic on admission. No c/o chest pain or dyspnea on exertion. Had stress test in [place] in 2016 which was neg for ischemia (not able to review these records). Well managed with regimen of Coreg 3.125mg BID, aspirin 81mg daily, Repatha subQ every 2 wks (hx of statin intolerance) and torsemide 20mg BID.   -- conts on Coreg, aspirin and torsemide    Stage III CKD:  Cr 1.25 on 11/1/17, had been 1.3 - 1.6 earlier this year. Cr 1.23 on admission  -- Cr with slow upward trend this stay: 1.23 on admission -- 1.48 today  -- ID recommends holding additional doses of Vanco while awaiting cultures  -- monitor BMPs, encourage po intake, will need to hold torsemide and consider IVFs if Cr conts trending up    Alcoholic cirrhosis  Reportedly drinks 2 drinks per day. AST nl this stay.   -- no s/sx of withdrawal this stay    Peripheral Neuropathy:  EtOH use thought to be contributing.   Sx stable on gabapentin which has been continued    Normocytic Anemia / Thrombocytopenia:  Hgb 14.0 on admission -- 11.1 on POD #1, likely dt acute blood loss with surgery  Platelet counts low but stable (100-120s)  -- monitor daily CBC    Hyponatremia: Resolved  Hypokalemia: Resolved  Na 131 on admission. Normalized with po intake.  Has scheduled K replacement while on diuretics, K replacement protocol also ordered    Thrombocytopenia:  Chronic, likely related to EtOH use. Baseline appears to be in 120s. Likely exacerbated by infection as above    FEN: no IVFs, lytes as above, cardiac diet  DVT Prophylaxis: warfarin  Code Status: Special Code    Disposition: Pending postop course and plan for abx  -- likely several days still.     Omaira Marienita Coleman    Interval History   Noted issues with agitation overnight. Redirectable. Had bedside sitter for awhile, able to dc sitter this morning. Patient seen this afternoon. Resting comfortably. Says pain well controlled. Eating/drinking okay -- denies abd pain/n/v. Passing flatus.     -Data reviewed today: I reviewed all new labs and imaging results over the last 24 hours. I personally reviewed no images or EKG's today.    Physical Exam   Temp: 97.9  F (36.6  C) Temp src: Oral BP: 108/61 Pulse: 60 Heart Rate: 58 Resp: 16 SpO2: 95 % O2 Device: None (Room air) Oxygen Delivery: 4 LPM  Vitals:    11/29/17 1629   Weight: 99.8 kg (220 lb)     Vital Signs with Ranges  Temp:  [97.6  F (36.4  C)-99.1  F (37.3  C)] 97.9  F (36.6  C)  Pulse:  [60] 60  Heart Rate:  [54-70] 58  Resp:  [9-20] 16  BP: ()/(56-88) 108/61  SpO2:  [84 %-100 %] 95 %  I/O last 3 completed shifts:  In: 1470 [P.O.:970; I.V.:500]  Out: 1300 [Urine:1150; Blood:150]    Constitutional: Resting comfortably, alert and conversing appropriately, NAD  Respiratory: CTAB, no wheeze/rales/rhonchi, no increased work of breathing  Cardiovascular: HRRR w/++SM throughout precordium, no RLE edema  GI: S, NT, ND, +BS  Skin/Integumen: warm/dry, wound vac in place over L stump  Other:      Medications     HEParin 1,600 Units/hr (12/02/17 1013)       sodium chloride (PF)  3 mL Intracatheter Q8H     senna-docusate  1-2 tablet Oral BID     docusate sodium  100 mg Oral BID     potassium chloride SA  10 mEq Oral Daily     gabapentin (NEURONTIN) tablet 600 mg  600 mg Oral BID     carvedilol (COREG) tablet 3.125 mg  3.125 mg Oral BID w/meals     aspirin EC EC tablet 81 mg  81 mg Oral Daily     piperacillin-tazobactam  3.375 g Intravenous Q6H     gabapentin  1,200 mg Oral At Bedtime       Data     Recent Labs  Lab 12/02/17  0508 12/01/17  1516 12/01/17  0712 11/30/17  1738 11/30/17  0747  11/29/17  1649   WBC  --   --  8.3   --  7.8  --  11.0   HGB 11.1*  --  12.3*  --  12.5*  --  14.0   MCV  --   --  94  --  94  --  95   PLT  --   --  116*  --  106*  --  125*   INR  --  1.65* 1.91*  --  2.56*  < > Canceled, Test credited   NA  --   --  133  --  134  --  131*   POTASSIUM  --   --  3.7 3.7 3.1*  --  3.9   CHLORIDE  --   --  99  --  99  --  96   CO2  --   --  26  --  26  --  26   BUN  --   --  26  --  25  --  23   CR 1.48*  --  1.43*  --  1.27*  --  1.23   ANIONGAP  --   --  8  --  9  --  9   BENNIE  --   --  8.3*  --  8.3*  --  8.4*   *  --  93  --  95  --  105*   AST  --   --   --   --  18  --   --    < > = values in this interval not displayed.    No results found for this or any previous visit (from the past 24 hour(s)).

## 2017-12-02 NOTE — BRIEF OP NOTE
[facility] Brief Operative Note    Pre-operative diagnosis: ABSCESS BKA STUMP   Post-operative diagnosis Abscess BKA stump   Procedure: Procedure(s):  INCISION AND DRAINAGE OF LEFT BELOW KNEE AMPUTATION ABSCESS, WOUND VAC PLACEMENT - Wound Class: II-Clean Contaminated   Surgeon(s): Surgeon(s) and Role:     * Saima Howard MD - Primary     * Iftikhar Santamaria PA-C - Assisting   Estimated blood loss: 150 mL    Specimens:   ID Type Source Tests Collected by Time Destination   1 : LEFT STUMP WOUND Fluid Leg Lower, Left ANAEROBIC BACTERIAL CULTURE, FLUID CULTURE AEROBIC BACTERIAL, GRAM STAIN Saima Howard MD 12/1/2017  5:56 PM       Findings: Abscess BKA stump

## 2017-12-02 NOTE — PLAN OF CARE
Problem: Patient Care Overview  Goal: Plan of Care/Patient Progress Review  OT - Attempted to see pt for scheduled evaluation, however, pt sleeping soundly and pt's wife requesting OT let him sleep and return tomorrow.

## 2017-12-02 NOTE — PROGRESS NOTES
Orders for PT received and chart reviewed. PT eval completed and treatment initiated. Patient is a 71 yo male admittd to ED on 11/29/17 due to L LE stump edema/wound and is s/p I & D (12/1/17). PMH: h/o PE on coumadin, L BKA (4 yrs ago), DM, neuropathy, h/o ETOH, anemia, anticardiolipin antibody syndrome, aortic stenosis, OA, cardiomyopathy, CAD, MI, hyper lipidemia, HTN, Thoracic Aortic Aneurysm, h/o TOBI, CKD III and s/p CABG x 3 (2007). PLOF: patient reports he lives in an apartment with his spouse and was I with ADLs/ambulation. [place]    Discharge Planner PT   Patient plan for discharge: nothing in chart at this point  Current status: Patient reports pain is 7-8/10 in L LE/stump. Able to perform bed mobility with CGA, sit <> stand using FWW, elevated bed and Min/Mod A to attain full upright standing posture. Patient able to stand with FWW and CGA x 3 min. Patient able to sit EOB x 5 min with supervision.   Barriers to return to prior living situation: amount of assist required for transfers and personal cares  Recommendations for discharge: to be determined  Rationale for recommendations: level of pain, amount of assist required for bed mobility/transfers       Entered by: Adore Duarte 12/02/2017 11:20 AM

## 2017-12-02 NOTE — CONSULTS
[facility]    Infectious Disease Consultation     Date of Admission:  11/29/2017  Date of Consult (When I saw the patient): 12/02/17    Assessment & Plan   Antonio Ramirez is a 72 year old male who was admitted on 11/29/2017.     Impression:  1. 72 y.o male admitted with left BKA stump abscess.   2. CAD, Peripheral neuropathy, alcohol abuse, CKD stage 3.   3. Started on broad spectrum antibiotics with plans for I and D.     Recommendations:   Given CKD and unknown if MRSA is the cause for now continue on zosyn alone and discontinue vancomycin. Will follow up on the cultures and if MRSA will put on appropriate antibiotic. Given CKD I think enough vanco in the sytem for now.       Claire Salazar MD    Reason for Consult   Reason for consult: I was asked by Dr. Howard  to evaluate this patient for BKA stump infection .    Primary Care Physician   Main Hardy    Chief Complaint   BKA infection    History is obtained from the patient and medical records    History of Present Illness   Antonio Ramirez is a 72 year old male with CAD, CKD stage 3, chronic alcohol use with alcohol related liver disease in past, and idiopathic neuropathy History of gangrene and left BKA April 2014.  2 weeks ago in [place] saw his prosthetic yulisa who thought he might benefit from a tighter prosthesis rubber sleeve, and then 3-4 days ago he noted pain and swelling, progressively worse, temp up to 102, chills, and presented to ER today and ultrasound shows a fluid collection around the left BKA stump max 6.6 cm in size.       Past Medical History   I have reviewed this patient's medical history and updated it with pertinent information if needed.   Past Medical History:   Diagnosis Date     Alcoholism (H)      Amputated left leg (H)      Anemia      Anticardiolipin antibody syndrome (H)      Antiplatelet or antithrombotic long-term use      Aortic stenosis      Arthritis      Balance problem      Cardiomyopathy (H)       Coagulation disorder (H)     cardiolipinantibody syndrome     Coronary artery disease      Gastro-oesophageal reflux disease      Heart attack 2007     Hiatal hernia      Hypercholesterolemia      Hypertension      Left foot drop      Liver disease     alcoholic liver disease, alcoholic cirrohsosis, portal hypertension     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)     ?When diagnosed, patient not aware of this     Moderate mitral regurgitation      Monoclonal gammopathy      Neuropathy      Noninfectious ileitis     diverticulitis of colon     PE (pulmonary embolism) 2006     Prostate cancer (H) 2000    Treated with radiation (seed implants in 2000) -- no problems since     Renal disease     kidney stones     Syncope      Thoracic aortic aneurysm, without rupture      Thrombocytopenia (H)      Thrombosis of leg      Urethral stricture        Past Surgical History   I have reviewed this patient's surgical history and updated it with pertinent information if needed.  Past Surgical History:   Procedure Laterality Date     AMPUTATE LEG BELOW KNEE Left 04/2014    related to gangrene, started as foot ulcer related to neuropathy     APPENDECTOMY       ARTHROPLASTY KNEE Right 9/19/2014    Procedure: ARTHROPLASTY KNEE;  Surgeon: Saima Howard MD;  Location:  OR     CARDIAC SURGERY      CABG     CHOLECYSTECTOMY       COLONOSCOPY       CYSTOSCOPY, DILATE URETHRA, COMBINED N/A 10/12/2016    Procedure: COMBINED CYSTOSCOPY, DILATE URETHRA;  Surgeon: Dimitry Bello MD;  Location:  OR     ENDOSCOPIC STRIPPING VEIN(S)       esophagogastroduodenoscopy       EYE SURGERY      cataracts     GENITOURINARY SURGERY      Prostate Seen Implants     HERNIA REPAIR      Umbilical     IR IVC FILTER PLACEMENT       ORTHOPEDIC SURGERY      (R) knee scope     ORTHOPEDIC SURGERY      total hip      ORTHOPEDIC SURGERY      elbow surgery       Prior to Admission Medications   Prior to Admission Medications   Prescriptions Last Dose  "Informant Patient Reported? Taking?   ASPIRIN EC PO 11/29/2017 at Unknown time  Yes Yes   Sig: Take 81 mg by mouth daily   CARVEDILOL PO 11/29/2017 at Unknown time  Yes Yes   Sig: Take 3.125 mg by mouth 2 times daily (with meals)    Gabapentin (NEURONTIN PO) 11/29/2017 at Unknown time  Yes Yes   Sig: Take 600 mg by mouth 3 times daily 600 mg in morning, 600 mg midday & 1200 mg at HS   WARFARIN SODIUM PO 11/28/2017 at Unknown time  Yes Yes   Sig: Take 5 mg by mouth daily 5mg Monday to Saturday and no dose on sundays.   evolocumab (REPATHA) 140 MG/ML prefilled syringe 11/17/2017  Yes Yes   Sig: Inject 1 pen subcutaneously every 2 weeks. For sample use only   order for DME   No No   Sig: [facility] Phone [phone] Fax [phone]    Primary Dressing Woun'Dres Gel   Qty DO NOT DISPENSE  Secondary Dressing Mepilex 4x4 Qty 60 (for two wounds)  Length of Need: 1 month  Frequency of dressing change: daily   potassium chloride SA (K-DUR/KLOR-CON M) 10 MEQ CR tablet 11/29/2017 at Unknown time  Yes Yes   Sig: Take 10 mEq by mouth daily   torsemide (DEMADEX) 20 MG tablet   Yes Yes   Sig: Take 20 mg by mouth 2 times daily       Facility-Administered Medications: None     Allergies   Allergies   Allergen Reactions     Hmg-Coa-R Inhibitors Other (See Comments)     Rhabdomyolysis occurred within a couple days     Ciprofloxacin Itching     Severe itching \"like ants were crawling\"       Immunization History   Immunization History   Administered Date(s) Administered     Influenza (High Dose) 3 valent vaccine 09/20/2014, 09/01/2016, 09/25/2017     Pneumococcal (PCV 13) 06/21/2016     Pneumococcal 23 valent 09/22/2014     TDAP Vaccine (Adacel) 10/06/2016       Social History   I have reviewed this patient's social history and updated it with pertinent information if needed. Antonio Ramirez  reports that he has never smoked. He does not have any smokeless tobacco history on file. He reports that he drinks alcohol. He reports " that he does not use illicit drugs.    Family History   I have reviewed this patient's family history and updated it with pertinent information if needed.   Family History   Problem Relation Age of Onset     Lung Cancer Mother      Heart Failure Father       age 87       Review of Systems   The 10 point Review of Systems is negative other than noted in the HPI or here.     Physical Exam   Temp: 98.6  F (37  C) Temp src: Oral BP: 104/58 Pulse: 60 Heart Rate: 70 Resp: 16 SpO2: 94 % O2 Device: None (Room air) Oxygen Delivery: 4 LPM  Vital Signs with Ranges  Temp:  [97.6  F (36.4  C)-99.1  F (37.3  C)] 98.6  F (37  C)  Pulse:  [60] 60  Heart Rate:  [54-70] 70  Resp:  [9-20] 16  BP: (103-126)/(56-88) 104/58  SpO2:  [84 %-100 %] 94 %  220 lbs 0 oz    GENERAL APPEARANCE:  alert and no distress  EYES: Eyes grossly normal to inspection, PERRL and conjunctivae and sclerae normal  HENT: ear canals and TM's normal and nose and mouth without ulcers or lesions  NECK: no adenopathy, no asymmetry, masses, or scars and thyroid normal to palpation  RESP: lungs clear to auscultation - no rales, rhonchi or wheezes  CV: regular rates and rhythm, normal S1 S2, no S3 or S4 and no murmur, click or rub  LYMPHATICS: normal ant/post cervical and supraclavicular nodes  ABDOMEN: soft, nontender, without hepatosplenomegaly or masses and bowel sounds normal  MS: wound vac in place   SKIN: no suspicious lesions or rashes      Data   All laboratory data reviewed    Recent Labs  Lab 17  1756 17  1811 17  1649   CULT PENDING  PENDING No growth after 3 days No growth after 3 days     Recent Labs   Lab Test  17   1756  17   1811  17   1649   CULT  PENDING  PENDING  No growth after 3 days  No growth after 3 days

## 2017-12-02 NOTE — PROVIDER NOTIFICATION
Brief update:    STAT consult paged to hospitalist service for agitation.    Patient was admitted, followed by hospitalist service, though underwent abscess drainage of BKA today by orthopedics. I have discontinued consult order.    Called back to nursing station and patient is agitated/delirious, though redirectable. History of delirium with prior procedures noted in chart. Nursing staff requests bedside sitter at this time. Chemical sedation offered, though not though to be needed at this time by nursing given ability to re-direct. Can re-address this if needed.    Von Gomez MD  11:42 PM

## 2017-12-02 NOTE — PROGRESS NOTES
"Antonio Ramirez  2017  POD # 1 sp I&D BKA stump.    Admit Date:  2017      Doing well.  Normal healing wound.  No immediate surgical complications identified.  No excessive bleeding  Pain well-controlled.  Objective:  Blood pressure 104/58, pulse 60, temperature 98.6  F (37  C), temperature source Oral, resp. rate 16, height 1.854 m (6' 1\"), weight 99.8 kg (220 lb), SpO2 94 %.    Temperatures:  Current - Temp: 98.6  F (37  C); Max - Temp  Av.5  F (36.9  C)  Min: 97.6  F (36.4  C)  Max: 99.1  F (37.3  C)  Pulse range: Pulse  Av  Min: 60  Max: 60  Blood pressure range: Systolic (24hrs), Av , Min:103 , Max:126   ; Diastolic (24hrs), Av, Min:56, Max:88    Exam:  CMS: intact  alert, stable, wound ok    Labs:  Recent Labs   Lab Test  17   0712  17   1738  17   0747   POTASSIUM  3.7  3.7  3.1*     Recent Labs   Lab Test  17   0508  17   0712  17   0747   HGB  11.1*  12.3*  12.5*     Recent Labs   Lab Test  17   1516  17   0712  17   0747   INR  1.65*  1.91*  2.56*     Recent Labs   Lab Test  17   0712  17   0747  17   1649   PLT  116*  106*  125*       PLAN: Non weight bearing  Pain control measures  Additional problems to be followed by medicine physician  Cont wound vac. Pt will need repeat I&D next week.       "

## 2017-12-02 NOTE — PLAN OF CARE
Problem: Patient Care Overview  Goal: Plan of Care/Patient Progress Review  Outcome: Improving  POD#:1. A&Ox1-3, can become agitated and uncooperative. Bedrest on night shift, dangled. Pain: controlled with  oxy 5mg x1, refused everything else. Incontinent of B&B. IV infusing, hep drip. VSS on RA. Dressing CDI. DC TBD. Woundvac present. Cont to monitor.

## 2017-12-03 ENCOUNTER — APPOINTMENT (OUTPATIENT)
Dept: PHYSICAL THERAPY | Facility: CLINIC | Age: 72
DRG: 464 | End: 2017-12-03
Payer: MEDICARE

## 2017-12-03 LAB
ANION GAP SERPL CALCULATED.3IONS-SCNC: 6 MMOL/L (ref 3–14)
BACTERIA SPEC CULT: ABNORMAL
BUN SERPL-MCNC: 23 MG/DL (ref 7–30)
CALCIUM SERPL-MCNC: 8.5 MG/DL (ref 8.5–10.1)
CHLORIDE SERPL-SCNC: 98 MMOL/L (ref 94–109)
CO2 SERPL-SCNC: 29 MMOL/L (ref 20–32)
CREAT SERPL-MCNC: 1.58 MG/DL (ref 0.66–1.25)
ERYTHROCYTE [DISTWIDTH] IN BLOOD BY AUTOMATED COUNT: 14.4 % (ref 10–15)
GFR SERPL CREATININE-BSD FRML MDRD: 43 ML/MIN/1.7M2
GLUCOSE SERPL-MCNC: 90 MG/DL (ref 70–99)
HCT VFR BLD AUTO: 31.9 % (ref 40–53)
HGB BLD-MCNC: 10.7 G/DL (ref 13.3–17.7)
HGB BLD-MCNC: 8.9 G/DL (ref 13.3–17.7)
LMWH PPP CHRO-ACNC: 0.35 IU/ML
Lab: ABNORMAL
MCH RBC QN AUTO: 32.2 PG (ref 26.5–33)
MCHC RBC AUTO-ENTMCNC: 33.5 G/DL (ref 31.5–36.5)
MCV RBC AUTO: 96 FL (ref 78–100)
PLATELET # BLD AUTO: 121 10E9/L (ref 150–450)
POTASSIUM SERPL-SCNC: 4.2 MMOL/L (ref 3.4–5.3)
RBC # BLD AUTO: 3.32 10E12/L (ref 4.4–5.9)
SODIUM SERPL-SCNC: 133 MMOL/L (ref 133–144)
SPECIMEN SOURCE: ABNORMAL
WBC # BLD AUTO: 6.5 10E9/L (ref 4–11)

## 2017-12-03 PROCEDURE — A9270 NON-COVERED ITEM OR SERVICE: HCPCS | Mod: GY | Performed by: ORTHOPAEDIC SURGERY

## 2017-12-03 PROCEDURE — 85018 HEMOGLOBIN: CPT | Performed by: INTERNAL MEDICINE

## 2017-12-03 PROCEDURE — A9270 NON-COVERED ITEM OR SERVICE: HCPCS | Mod: GY | Performed by: INTERNAL MEDICINE

## 2017-12-03 PROCEDURE — 12000007 ZZH R&B INTERMEDIATE

## 2017-12-03 PROCEDURE — 25000132 ZZH RX MED GY IP 250 OP 250 PS 637: Mod: GY | Performed by: INTERNAL MEDICINE

## 2017-12-03 PROCEDURE — 40000193 ZZH STATISTIC PT WARD VISIT

## 2017-12-03 PROCEDURE — 25000132 ZZH RX MED GY IP 250 OP 250 PS 637: Mod: GY | Performed by: ORTHOPAEDIC SURGERY

## 2017-12-03 PROCEDURE — 99233 SBSQ HOSP IP/OBS HIGH 50: CPT | Performed by: INTERNAL MEDICINE

## 2017-12-03 PROCEDURE — 80048 BASIC METABOLIC PNL TOTAL CA: CPT | Performed by: INTERNAL MEDICINE

## 2017-12-03 PROCEDURE — 25000128 H RX IP 250 OP 636: Performed by: INTERNAL MEDICINE

## 2017-12-03 PROCEDURE — 40000141 ZZH STATISTIC PERIPHERAL IV START W/O US GUIDANCE

## 2017-12-03 PROCEDURE — 99207 ZZC APP CREDIT; MD BILLING SHARED VISIT: CPT | Performed by: NURSE PRACTITIONER

## 2017-12-03 PROCEDURE — 25000128 H RX IP 250 OP 636: Performed by: ORTHOPAEDIC SURGERY

## 2017-12-03 PROCEDURE — 97110 THERAPEUTIC EXERCISES: CPT | Mod: GP

## 2017-12-03 PROCEDURE — 85027 COMPLETE CBC AUTOMATED: CPT | Performed by: INTERNAL MEDICINE

## 2017-12-03 PROCEDURE — 36415 COLL VENOUS BLD VENIPUNCTURE: CPT | Performed by: INTERNAL MEDICINE

## 2017-12-03 PROCEDURE — E2402 NEG PRESS WOUND THERAPY PUMP: HCPCS

## 2017-12-03 PROCEDURE — 36415 COLL VENOUS BLD VENIPUNCTURE: CPT | Performed by: ORTHOPAEDIC SURGERY

## 2017-12-03 PROCEDURE — 85520 HEPARIN ASSAY: CPT | Performed by: ORTHOPAEDIC SURGERY

## 2017-12-03 PROCEDURE — 97530 THERAPEUTIC ACTIVITIES: CPT | Mod: GP

## 2017-12-03 RX ORDER — SODIUM CHLORIDE 9 MG/ML
INJECTION, SOLUTION INTRAVENOUS CONTINUOUS
Status: DISCONTINUED | OUTPATIENT
Start: 2017-12-03 | End: 2017-12-04

## 2017-12-03 RX ADMIN — HEPARIN SODIUM 1600 UNITS/HR: 10000 INJECTION, SOLUTION INTRAVENOUS at 02:58

## 2017-12-03 RX ADMIN — SENNOSIDES AND DOCUSATE SODIUM 2 TABLET: 8.6; 5 TABLET ORAL at 21:30

## 2017-12-03 RX ADMIN — ASPIRIN 81 MG: 81 TABLET, COATED ORAL at 21:30

## 2017-12-03 RX ADMIN — OXYCODONE HYDROCHLORIDE 5 MG: 5 TABLET ORAL at 07:30

## 2017-12-03 RX ADMIN — GABAPENTIN 600 MG: 600 TABLET, FILM COATED ORAL at 07:30

## 2017-12-03 RX ADMIN — TAZOBACTAM SODIUM AND PIPERACILLIN SODIUM 3.38 G: 375; 3 INJECTION, SOLUTION INTRAVENOUS at 15:33

## 2017-12-03 RX ADMIN — DOCUSATE SODIUM 100 MG: 100 CAPSULE, LIQUID FILLED ORAL at 21:30

## 2017-12-03 RX ADMIN — OXYCODONE HYDROCHLORIDE 5 MG: 5 TABLET ORAL at 17:52

## 2017-12-03 RX ADMIN — TAZOBACTAM SODIUM AND PIPERACILLIN SODIUM 3.38 G: 375; 3 INJECTION, SOLUTION INTRAVENOUS at 02:57

## 2017-12-03 RX ADMIN — DOCUSATE SODIUM 100 MG: 100 CAPSULE, LIQUID FILLED ORAL at 09:46

## 2017-12-03 RX ADMIN — TAZOBACTAM SODIUM AND PIPERACILLIN SODIUM 3.38 G: 375; 3 INJECTION, SOLUTION INTRAVENOUS at 21:30

## 2017-12-03 RX ADMIN — HEPARIN SODIUM 1600 UNITS/HR: 10000 INJECTION, SOLUTION INTRAVENOUS at 19:13

## 2017-12-03 RX ADMIN — POTASSIUM CHLORIDE 10 MEQ: 750 TABLET, EXTENDED RELEASE ORAL at 09:46

## 2017-12-03 RX ADMIN — TAZOBACTAM SODIUM AND PIPERACILLIN SODIUM 3.38 G: 375; 3 INJECTION, SOLUTION INTRAVENOUS at 09:47

## 2017-12-03 RX ADMIN — OXYCODONE HYDROCHLORIDE 5 MG: 5 TABLET ORAL at 02:57

## 2017-12-03 RX ADMIN — CARVEDILOL 3.12 MG: 3.12 TABLET, FILM COATED ORAL at 09:46

## 2017-12-03 RX ADMIN — GABAPENTIN 1200 MG: 300 CAPSULE ORAL at 21:30

## 2017-12-03 RX ADMIN — OXYCODONE HYDROCHLORIDE 5 MG: 5 TABLET ORAL at 21:33

## 2017-12-03 RX ADMIN — SENNOSIDES AND DOCUSATE SODIUM 2 TABLET: 8.6; 5 TABLET ORAL at 09:46

## 2017-12-03 RX ADMIN — SODIUM CHLORIDE: 9 INJECTION, SOLUTION INTRAVENOUS at 08:37

## 2017-12-03 RX ADMIN — GABAPENTIN 600 MG: 600 TABLET, FILM COATED ORAL at 15:39

## 2017-12-03 ASSESSMENT — PAIN DESCRIPTION - DESCRIPTORS: DESCRIPTORS: ACHING

## 2017-12-03 NOTE — PROGRESS NOTES
Ortho  Wound vac alarming. Dr. Howard saw patient last night and reinforced dressing. Today dressing appears ballotable and the wound vac does not appear to be working. I lanced the dressing to release pressure and minimize maceration. Dressing to be evaluated in am by wound vac nurse and Dr. Howard's team.

## 2017-12-03 NOTE — PLAN OF CARE
"Problem: Patient Care Overview  Goal: Plan of Care/Patient Progress Review  Outcome: No Change  Moves well in bed. Attitude improved this evening and is quite cooperative. Wound vac alarming \"blockage alert\" at intervals thru day. MD called and  came to reassess and reinforce wound vac. Now alarming only at rare intervals. Called on call MD - see new orders. Taking 1 oxycodone for pain- does state that higher doses does cause some confusion and hallucinations. Will continue to monitor.      "

## 2017-12-03 NOTE — PLAN OF CARE
Problem: Patient Care Overview  Goal: Plan of Care/Patient Progress Review  Patient plan for discharge: nothing in chart at this point  Current status:Supine to/from sit with SBA. Sit to/from stand x 2 with FWW from elevated bed height with CGA/min assist. Pt able to stand for up to 2 minutes with CGA/SBA. Able to minimally lift R LE off the floor, unable to fully clear to take any meaningful steps. Able to pivot to the side for better positioning prior to sitting down. Noted pt's IV dislodged after returning supine. RN aware. All needs in reach and bed alarm on upon PT exit.  Barriers to return to prior living situation: amount of assist required for transfers and personal cares, pain level  Recommendations for discharge: home per the plan established by the Physical Therapist   Rationale for recommendations: Pt progressing each session, likely able to discharge home with assist pending continued progress.

## 2017-12-03 NOTE — PLAN OF CARE
Problem: Patient Care Overview  Goal: Plan of Care/Patient Progress Review  Outcome: Improving  POD#:3. A&Ox4, improved from last night, more pleasant and cooperative. Chairfast. Pain controlled with oxy. Voiding adequately via urinal. Dressing CDI, woundvac present, has alarmed during shift, MD aware from previous shift. VSS on RA, BP can be on soft side. IV infusing, hep drip, infused with zosyn as approved by pharmacy and [employer]. CMS baseline. DC TBD. Cont to monitor.

## 2017-12-03 NOTE — PROGRESS NOTES
[facility]    Hospitalist Progress Note    Assessment & Plan   Antonio Ramirez is a 72 year old male with complex PMHx including CAD with hx of 3V CABG in 2007, CHF, paroxysmal afib, thoracic aneurysm, stage III CKD, alcoholic cirrhosis, peripheral neuropathy, hx of MGUS, hx of prostate Ca, antiphospholipid Ab syndrome with hx of DVT/PE and IVC filter placement on chronic anticoagulation with warfarin and hx of L BKA in 2014 who was admitted on 11/29/2017 with pain and increased swelling in his stump with reported fever and chills with US showing a 6cm fluid collection.     Suspected L BKA Abscess/Cellulitis, s/p I&D with placement of wound vac on 12/1/17: POD #2  Reportedly saw a prosthetics specialist in [place] two weeks prior to admission who recommended a tighter prosthesis ruber sleeve, which the patient began wearing. 3-4d prior to admission he began to notice pain and swelling in his stump which continued to worsen. Endorsed fever and chills with Tmax 102 at home. Afebrile in ED, hemodynamically stable and WBC 11.0 on admission. Noted erythema of stump. US in ED showed a 6.6 c 3.1 x 1.6cm complex fluid collection concerning for abscess. Vanco and Zosyn started on admission.     -- see by ortho, underwent I&D on 12/1 with placement of wound vac, will need repeat I&D in the coming days  -- mgmt of wound vac per surgery  -- remains afebrile, blood cultures drawn on admission remain neg, intraop cultures growing staph aureus  -- ID following, recommend cont Zosyn alone for now  -- has prn oxycodone and IV dilaudid for pain control    Anticardiolipin Ab syndrome   With hx of DVT/PE. Had IVC filter placed several years ago (which is reportedly thrombosed) and is presently on chronic anticoagulation with warfarin (monitors with chromogenic factor X levels). INR 2.53 on admission. Per initial labs, chromogenic factor X levels appear to be correlating with INR. INR reversed for I&D -- given 5mg  Vitamin K on 11/29 PM, 2mg IV Vitamin K on 11/30 PM and an additional 1mg vitamin K on 12/1 AM.  -- given high risk for clot, placed on heparin gtt on POD #0  -- holding warfarin for now given plans to go back to OR in the coming days  -- now with oozing from wound vac -- holding heparin gtt this afternoon, discussed with Dr. Casper -- no need to hold heparin gtt    -- discussed heparin gtt with pharmacy this evening -- adjusted goal heparin Xa level for the night (decreased goal from 0.3-0.7 to 0.2-0.4) until wound vac issues addressed in AM     CAD with hx of 3V CABG in 2007  Chronic Systolic CHF  Paroxysmal afib  Mild AS / Moderate MR  Hypertension / Hyperlipidemia  Echo through [facility] in 5/2017 showed EF 45-50% and reduced RVSF with elevated pulmonary pressures. Euvolemic on admission. No c/o chest pain or dyspnea on exertion. Had stress test in [place] in 2016 which was neg for ischemia (not able to review these records). Well managed with regimen of Coreg 3.125mg BID, aspirin 81mg daily, Repatha subQ every 2 wks (hx of statin intolerance) and torsemide 20mg BID.   -- conts on Coreg, aspirin  -- torsemide on hold as of 12/1    Stage III CKD:  Cr 1.25 on 11/1/17, had been 1.3 - 1.6 earlier this year. Cr 1.23 on admission  -- Cr with slow upward trend this stay: 1.23 on admission -- 1.58 today  -- ID recommends holding additional doses of Vanco while awaiting cultures  -- torsemide on hold as of 12/1  -- start NS @50ml/h today given Cr conts trending up  -- monitor BMPs, encourage po intake, will need to hold torsemide and consider IVFs if Cr conts trending up    Alcoholic cirrhosis  Reportedly drinks 2 drinks per day. AST nl this stay.   -- no s/sx of withdrawal this stay    Peripheral Neuropathy:  EtOH use thought to be contributing.   Sx stable on gabapentin which has been continued    Normocytic Anemia:  Hgb 14.0 on admission -- 11.1 on POD #1 -- 10.7 on POD #2, likely dt acute blood loss with surgery and  oozing while on heparin gtt   -- given ongoing issues with oozing and bleeding at wound vac site will check hgb this evening  -- monitor daily CBC    Thrombocytopenia:  Chronic, likely related to EtOH use. Baseline appears to be in 120s.   Platelets down to 106 on 11/30, likely dt infection, have since normalized    Hyponatremia: Resolved  Hypokalemia: Resolved  Na 131 on admission. Normalized with po intake.  Has scheduled K replacement while on diuretics, K replacement protocol also ordered    FEN: no IVFs, lytes as above, cardiac diet  DVT Prophylaxis: warfarin  Code Status: Full Code    Disposition: Pending postop course and plan for abx -- likely several days still.     Omaira Coleman    Interval History   Seen this afternoon. Feeling okay. Ongoing issues with wound vac and oozing while on heparin. House NP called earlier this morning to address. Due to ongoing oozing, stopped heparin gtt earlier this afternoon.  Endorses some pain in stump but controlled with meds. Appetite okay, no n/v.     -Data reviewed today: I reviewed all new labs and imaging results over the last 24 hours. I personally reviewed no images or EKG's today.    Physical Exam   Temp: 97.5  F (36.4  C) Temp src: Oral BP: 94/51   Heart Rate: 50 Resp: 16 SpO2: 94 % O2 Device: None (Room air)    Vitals:    11/29/17 1629   Weight: 99.8 kg (220 lb)     Vital Signs with Ranges  Temp:  [97.5  F (36.4  C)-98.4  F (36.9  C)] 97.5  F (36.4  C)  Heart Rate:  [50-94] 50  Resp:  [16-18] 16  BP: ()/(50-60) 94/51  SpO2:  [94 %-95 %] 94 %  I/O last 3 completed shifts:  In: 1540 [P.O.:1540]  Out: 960 [Urine:760; Drains:200]    Constitutional: Resting comfortably, alert and conversing appropriately, NAD  Respiratory: CTAB, no wheeze/rales/rhonchi, no increased work of breathing  Cardiovascular: HRRR w/++SM throughout precordium, no RLE edema  GI: S, NT, ND, +BS  Skin/Integumen: warm/dry, wound vac in place over L stump  Other:      Medications      NaCl 50 mL/hr at 12/03/17 0837     HEParin 1,600 Units/hr (12/03/17 0258)       sodium chloride (PF)  3 mL Intracatheter Q8H     senna-docusate  1-2 tablet Oral BID     docusate sodium  100 mg Oral BID     potassium chloride SA  10 mEq Oral Daily     gabapentin (NEURONTIN) tablet 600 mg  600 mg Oral BID     carvedilol (COREG) tablet 3.125 mg  3.125 mg Oral BID w/meals     aspirin EC EC tablet 81 mg  81 mg Oral Daily     piperacillin-tazobactam  3.375 g Intravenous Q6H     gabapentin  1,200 mg Oral At Bedtime       Data     Recent Labs  Lab 12/03/17  0616 12/02/17  0508 12/01/17  1516 12/01/17  0712 11/30/17  1738 11/30/17  0747   WBC 6.5  --   --  8.3  --  7.8   HGB 10.7* 11.1*  --  12.3*  --  12.5*   MCV 96  --   --  94  --  94   *  --   --  116*  --  106*   INR  --   --  1.65* 1.91*  --  2.56*     --   --  133  --  134   POTASSIUM 4.2  --   --  3.7 3.7 3.1*   CHLORIDE 98  --   --  99  --  99   CO2 29  --   --  26  --  26   BUN 23  --   --  26  --  25   CR 1.58* 1.48*  --  1.43*  --  1.27*   ANIONGAP 6  --   --  8  --  9   BENNIE 8.5  --   --  8.3*  --  8.3*   GLC 90 132*  --  93  --  95   AST  --   --   --   --   --  18       No results found for this or any previous visit (from the past 24 hour(s)).

## 2017-12-03 NOTE — PROVIDER NOTIFICATION
MD Notification    Person Name:  Jared    Date/Time: 12/3 2542    Interaction: Phone call    Purpose of Notification: Wound vac leaking bloody output, continuous heparin drip. Requesting assessment of pt.    Orders: Stop Hep drip

## 2017-12-03 NOTE — PLAN OF CARE
Problem: Patient Care Overview  Goal: Plan of Care/Patient Progress Review  Outcome: No Change  A&Ox4, cooperative with flat affect.  VSS ex soft BP's.  New PIV placed, infusing 50cc/h NS.  Wound vac leaking large amount of serous output around dressing, MD notified.  Ace wraps applied for pressure and L leg elevated.  Heparin drip stopped.  C/o 7/10 pain this am, gave Oxy x1.  Voiding in urinal at bedside.  No BM this shift, BS present and active (no BM for several days), scheduled bowel meds given.  Plan pending for L leg wound.

## 2017-12-03 NOTE — PROGRESS NOTES
[facility]    Infectious Disease Progress Note    Date of Service (when I saw the patient): 12/03/2017     Assessment & Plan   Antonio Ramirez is a 72 year old male who was admitted on 11/29/2017.       Impression:  1. 72 y.o male admitted with left BKA stump abscess. S/P I and D.   2. CAD, Peripheral neuropathy, alcohol abuse, CKD stage 3.   3. Started on broad spectrum antibiotics with plans for more I and D. Noted GPC in the cultures.      Recommendations:   Given CKD and unknown if MRSA is the cause for now continue on zosyn alone and discontinue vancomycin. Will follow up on the cultures and if MRSA will put on appropriate antibiotic. Given CKD I think enough vanco in the sytem for now.     Claire Salazar MD    Interval History   Remains afebrile GPC in the cultures     Physical Exam   Temp: 97.5  F (36.4  C) Temp src: Oral BP: 94/51   Heart Rate: 50 Resp: 16 SpO2: 94 % O2 Device: None (Room air)    Vitals:    11/29/17 1629   Weight: 99.8 kg (220 lb)     Vital Signs with Ranges  Temp:  [97.5  F (36.4  C)-98.4  F (36.9  C)] 97.5  F (36.4  C)  Heart Rate:  [50-94] 50  Resp:  [16-18] 16  BP: ()/(50-61) 94/51  SpO2:  [94 %-95 %] 94 %    Constitutional: Awake, alert,  Lungs: Clear to auscultation bilaterally, no crackles or wheezing  Cardiovascular: Regular rate and rhythm, normal S1 and S2, and no murmur noted  Abdomen: Normal bowel sounds, soft, non-distended, non-tender  Skin: wound vac on the stump   Other:    Medications     NaCl 50 mL/hr at 12/03/17 0837     HEParin 1,600 Units/hr (12/03/17 0258)       sodium chloride (PF)  3 mL Intracatheter Q8H     senna-docusate  1-2 tablet Oral BID     docusate sodium  100 mg Oral BID     potassium chloride SA  10 mEq Oral Daily     gabapentin (NEURONTIN) tablet 600 mg  600 mg Oral BID     carvedilol (COREG) tablet 3.125 mg  3.125 mg Oral BID w/meals     aspirin EC EC tablet 81 mg  81 mg Oral Daily     piperacillin-tazobactam  3.375 g Intravenous Q6H      gabapentin  1,200 mg Oral At Bedtime       Data   All microbiology laboratory data reviewed.  Recent Labs   Lab Test  12/03/17   0616  12/02/17   0508  12/01/17   0712  11/30/17   0747   WBC  6.5   --   8.3  7.8   HGB  10.7*  11.1*  12.3*  12.5*   HCT  31.9*   --   35.4*  35.6*   MCV  96   --   94  94   PLT  121*   --   116*  106*     Recent Labs   Lab Test  12/03/17   0616  12/02/17   0508  12/01/17   0712   CR  1.58*  1.48*  1.43*     No lab results found.  Recent Labs   Lab Test  12/01/17   1756  11/29/17   1811  11/29/17   1649   CULT  Light growth  Gram positive cocci  *  Culture in progress  Culture negative monitoring continues  No growth after 4 days  No growth after 4 days

## 2017-12-03 NOTE — PROGRESS NOTES
Called to evaluate bleeding from around a wound vac site. The drainage seeping out around the dressing is sanguinous. It appears to be pooling under the transparent dressing, and oozing upwards (leg is elevated). Dr. Casper (orthosur) is aware of this issue and will be by to assess the patient after he is out of the OR (thought to be within 90 minutes or so). I recommended calling to verify if he would like the heparin to continue, or held. I also advised to ace wrap the leg to apply pressure and elevate it until Dr. Casper is available to assess if this requires surgical intervention. The patient is hemodynamically stable, and without any signs of hypovolemia or shock. He is tolerating lying flat to allow passive leg raise, and is in no apparent distress.       DIPAK Murcia CNP  Hospitalist - House Officer  Pager: [phone] (7a - 6p)

## 2017-12-03 NOTE — PLAN OF CARE
Problem: Patient Care Overview  Goal: Plan of Care/Patient Progress Review  OT: Attempted to see pt for OT eval, pt has excessive drainage from LLE, direct care transferred to nursing.

## 2017-12-04 ENCOUNTER — ANESTHESIA EVENT (OUTPATIENT)
Dept: SURGERY | Facility: CLINIC | Age: 72
DRG: 464 | End: 2017-12-04
Payer: MEDICARE

## 2017-12-04 ENCOUNTER — APPOINTMENT (OUTPATIENT)
Dept: SURGERY | Facility: PHYSICIAN GROUP | Age: 72
End: 2017-12-04
Payer: MEDICARE

## 2017-12-04 ENCOUNTER — ANESTHESIA (OUTPATIENT)
Dept: SURGERY | Facility: CLINIC | Age: 72
DRG: 464 | End: 2017-12-04
Payer: MEDICARE

## 2017-12-04 PROBLEM — T87.44 INFECTION OF LEFT BELOW KNEE AMPUTATION (H): Status: ACTIVE | Noted: 2017-12-04

## 2017-12-04 LAB
ABO + RH BLD: NORMAL
ABO + RH BLD: NORMAL
ANION GAP SERPL CALCULATED.3IONS-SCNC: 7 MMOL/L (ref 3–14)
APTT PPP: 37 SEC (ref 22–37)
BLD GP AB SCN SERPL QL: NORMAL
BLD PROD TYP BPU: NORMAL
BLD UNIT ID BPU: 0
BLOOD BANK CMNT PATIENT-IMP: NORMAL
BLOOD PRODUCT CODE: NORMAL
BPU ID: NORMAL
BUN SERPL-MCNC: 18 MG/DL (ref 7–30)
CALCIUM SERPL-MCNC: 7.5 MG/DL (ref 8.5–10.1)
CHLORIDE SERPL-SCNC: 105 MMOL/L (ref 94–109)
CO2 SERPL-SCNC: 26 MMOL/L (ref 20–32)
CREAT SERPL-MCNC: 1.43 MG/DL (ref 0.66–1.25)
ERYTHROCYTE [DISTWIDTH] IN BLOOD BY AUTOMATED COUNT: 14.2 % (ref 10–15)
ERYTHROCYTE [DISTWIDTH] IN BLOOD BY AUTOMATED COUNT: 14.5 % (ref 10–15)
FIBRINOGEN PPP-MCNC: 278 MG/DL (ref 200–420)
GFR SERPL CREATININE-BSD FRML MDRD: 48 ML/MIN/1.7M2
GLUCOSE SERPL-MCNC: 104 MG/DL (ref 70–99)
GRAM STN SPEC: NORMAL
HCT VFR BLD AUTO: 23 % (ref 40–53)
HCT VFR BLD AUTO: 23.4 % (ref 40–53)
HGB BLD-MCNC: 10.2 G/DL (ref 13.3–17.7)
HGB BLD-MCNC: 10.2 G/DL (ref 13.3–17.7)
HGB BLD-MCNC: 7 G/DL (ref 13.3–17.7)
HGB BLD-MCNC: 8 G/DL (ref 13.3–17.7)
HGB BLD-MCNC: 8 G/DL (ref 13.3–17.7)
HGB BLD-MCNC: 9.5 G/DL (ref 13.3–17.7)
INR PPP: 1.65 (ref 0.86–1.14)
Lab: NORMAL
MCH RBC QN AUTO: 32.4 PG (ref 26.5–33)
MCH RBC QN AUTO: 32.9 PG (ref 26.5–33)
MCHC RBC AUTO-ENTMCNC: 34.2 G/DL (ref 31.5–36.5)
MCHC RBC AUTO-ENTMCNC: 34.8 G/DL (ref 31.5–36.5)
MCV RBC AUTO: 95 FL (ref 78–100)
MCV RBC AUTO: 95 FL (ref 78–100)
NUM BPU REQUESTED: 0
NUM BPU REQUESTED: 5
PLATELET # BLD AUTO: 127 10E9/L (ref 150–450)
PLATELET # BLD AUTO: 156 10E9/L (ref 150–450)
POTASSIUM SERPL-SCNC: 4.2 MMOL/L (ref 3.4–5.3)
RBC # BLD AUTO: 2.43 10E12/L (ref 4.4–5.9)
RBC # BLD AUTO: 2.47 10E12/L (ref 4.4–5.9)
SODIUM SERPL-SCNC: 138 MMOL/L (ref 133–144)
SPECIMEN EXP DATE BLD: NORMAL
SPECIMEN SOURCE: NORMAL
TRANSFUSION STATUS PATIENT QL: NORMAL
WBC # BLD AUTO: 5.6 10E9/L (ref 4–11)
WBC # BLD AUTO: 6.4 10E9/L (ref 4–11)

## 2017-12-04 PROCEDURE — 36000058 ZZH SURGERY LEVEL 3 EA 15 ADDTL MIN: Performed by: SURGERY

## 2017-12-04 PROCEDURE — 27210794 ZZH OR GENERAL SUPPLY STERILE: Performed by: SURGERY

## 2017-12-04 PROCEDURE — 37000009 ZZH ANESTHESIA TECHNICAL FEE, EACH ADDTL 15 MIN: Performed by: SURGERY

## 2017-12-04 PROCEDURE — 86923 COMPATIBILITY TEST ELECTRIC: CPT | Performed by: ORTHOPAEDIC SURGERY

## 2017-12-04 PROCEDURE — 25000132 ZZH RX MED GY IP 250 OP 250 PS 637: Mod: GY | Performed by: ORTHOPAEDIC SURGERY

## 2017-12-04 PROCEDURE — 99207 ZZC CDG-CRITICAL CARE TIME NOT DOCUMENTED: CPT | Performed by: INTERNAL MEDICINE

## 2017-12-04 PROCEDURE — 27210995 ZZH RX 272: Performed by: SURGERY

## 2017-12-04 PROCEDURE — 25000128 H RX IP 250 OP 636: Performed by: SURGERY

## 2017-12-04 PROCEDURE — 25000128 H RX IP 250 OP 636: Performed by: ORTHOPAEDIC SURGERY

## 2017-12-04 PROCEDURE — 40000169 ZZH STATISTIC PRE-PROCEDURE ASSESSMENT I: Performed by: SURGERY

## 2017-12-04 PROCEDURE — 71000015 ZZH RECOVERY PHASE 1 LEVEL 2 EA ADDTL HR: Performed by: SURGERY

## 2017-12-04 PROCEDURE — 36000056 ZZH SURGERY LEVEL 3 1ST 30 MIN: Performed by: SURGERY

## 2017-12-04 PROCEDURE — 87070 CULTURE OTHR SPECIMN AEROBIC: CPT | Performed by: SURGERY

## 2017-12-04 PROCEDURE — 12000007 ZZH R&B INTERMEDIATE

## 2017-12-04 PROCEDURE — 85018 HEMOGLOBIN: CPT | Performed by: PHYSICIAN ASSISTANT

## 2017-12-04 PROCEDURE — 35860 EXPLORE LIMB VESSELS: CPT | Performed by: SURGERY

## 2017-12-04 PROCEDURE — 25000132 ZZH RX MED GY IP 250 OP 250 PS 637: Mod: GY | Performed by: INTERNAL MEDICINE

## 2017-12-04 PROCEDURE — 86900 BLOOD TYPING SEROLOGIC ABO: CPT | Performed by: ORTHOPAEDIC SURGERY

## 2017-12-04 PROCEDURE — 86850 RBC ANTIBODY SCREEN: CPT | Performed by: ORTHOPAEDIC SURGERY

## 2017-12-04 PROCEDURE — 87205 SMEAR GRAM STAIN: CPT | Performed by: SURGERY

## 2017-12-04 PROCEDURE — 87102 FUNGUS ISOLATION CULTURE: CPT | Performed by: SURGERY

## 2017-12-04 PROCEDURE — 85610 PROTHROMBIN TIME: CPT | Performed by: INTERNAL MEDICINE

## 2017-12-04 PROCEDURE — 85384 FIBRINOGEN ACTIVITY: CPT | Performed by: INTERNAL MEDICINE

## 2017-12-04 PROCEDURE — 85018 HEMOGLOBIN: CPT | Performed by: INTERNAL MEDICINE

## 2017-12-04 PROCEDURE — 25000128 H RX IP 250 OP 636: Performed by: INTERNAL MEDICINE

## 2017-12-04 PROCEDURE — 36415 COLL VENOUS BLD VENIPUNCTURE: CPT | Performed by: INTERNAL MEDICINE

## 2017-12-04 PROCEDURE — 25000125 ZZHC RX 250: Performed by: NURSE ANESTHETIST, CERTIFIED REGISTERED

## 2017-12-04 PROCEDURE — 25000566 ZZH SEVOFLURANE, EA 15 MIN: Performed by: SURGERY

## 2017-12-04 PROCEDURE — 85730 THROMBOPLASTIN TIME PARTIAL: CPT | Performed by: INTERNAL MEDICINE

## 2017-12-04 PROCEDURE — 0Y3J0ZZ CONTROL BLEEDING IN LEFT LOWER LEG, OPEN APPROACH: ICD-10-PCS | Performed by: SURGERY

## 2017-12-04 PROCEDURE — 87075 CULTR BACTERIA EXCEPT BLOOD: CPT | Performed by: SURGERY

## 2017-12-04 PROCEDURE — 36416 COLLJ CAPILLARY BLOOD SPEC: CPT | Performed by: INTERNAL MEDICINE

## 2017-12-04 PROCEDURE — 36415 COLL VENOUS BLD VENIPUNCTURE: CPT | Performed by: PHYSICIAN ASSISTANT

## 2017-12-04 PROCEDURE — P9016 RBC LEUKOCYTES REDUCED: HCPCS | Performed by: ORTHOPAEDIC SURGERY

## 2017-12-04 PROCEDURE — 71000014 ZZH RECOVERY PHASE 1 LEVEL 2 FIRST HR: Performed by: SURGERY

## 2017-12-04 PROCEDURE — 25000128 H RX IP 250 OP 636: Performed by: NURSE ANESTHETIST, CERTIFIED REGISTERED

## 2017-12-04 PROCEDURE — 80048 BASIC METABOLIC PNL TOTAL CA: CPT | Performed by: INTERNAL MEDICINE

## 2017-12-04 PROCEDURE — A9270 NON-COVERED ITEM OR SERVICE: HCPCS | Mod: GY | Performed by: ORTHOPAEDIC SURGERY

## 2017-12-04 PROCEDURE — 37000008 ZZH ANESTHESIA TECHNICAL FEE, 1ST 30 MIN: Performed by: SURGERY

## 2017-12-04 PROCEDURE — 85027 COMPLETE CBC AUTOMATED: CPT | Performed by: INTERNAL MEDICINE

## 2017-12-04 PROCEDURE — 86901 BLOOD TYPING SEROLOGIC RH(D): CPT | Performed by: ORTHOPAEDIC SURGERY

## 2017-12-04 PROCEDURE — 99233 SBSQ HOSP IP/OBS HIGH 50: CPT | Performed by: INTERNAL MEDICINE

## 2017-12-04 PROCEDURE — A9270 NON-COVERED ITEM OR SERVICE: HCPCS | Mod: GY | Performed by: INTERNAL MEDICINE

## 2017-12-04 PROCEDURE — E2402 NEG PRESS WOUND THERAPY PUMP: HCPCS

## 2017-12-04 RX ORDER — CEFAZOLIN SODIUM 2 G/100ML
2 INJECTION, SOLUTION INTRAVENOUS
Status: COMPLETED | OUTPATIENT
Start: 2017-12-04 | End: 2017-12-04

## 2017-12-04 RX ORDER — EPHEDRINE SULFATE 50 MG/ML
INJECTION, SOLUTION INTRAMUSCULAR; INTRAVENOUS; SUBCUTANEOUS PRN
Status: DISCONTINUED | OUTPATIENT
Start: 2017-12-04 | End: 2017-12-04

## 2017-12-04 RX ORDER — PROPOFOL 10 MG/ML
INJECTION, EMULSION INTRAVENOUS PRN
Status: DISCONTINUED | OUTPATIENT
Start: 2017-12-04 | End: 2017-12-04

## 2017-12-04 RX ORDER — ONDANSETRON 4 MG/1
4 TABLET, ORALLY DISINTEGRATING ORAL EVERY 30 MIN PRN
Status: DISCONTINUED | OUTPATIENT
Start: 2017-12-04 | End: 2017-12-04 | Stop reason: HOSPADM

## 2017-12-04 RX ORDER — CEFAZOLIN SODIUM 2 G/100ML
2 INJECTION, SOLUTION INTRAVENOUS EVERY 8 HOURS
Status: DISCONTINUED | OUTPATIENT
Start: 2017-12-04 | End: 2017-12-10 | Stop reason: HOSPADM

## 2017-12-04 RX ORDER — SODIUM CHLORIDE, SODIUM LACTATE, POTASSIUM CHLORIDE, CALCIUM CHLORIDE 600; 310; 30; 20 MG/100ML; MG/100ML; MG/100ML; MG/100ML
INJECTION, SOLUTION INTRAVENOUS CONTINUOUS
Status: DISCONTINUED | OUTPATIENT
Start: 2017-12-04 | End: 2017-12-04 | Stop reason: HOSPADM

## 2017-12-04 RX ORDER — NALOXONE HYDROCHLORIDE 0.4 MG/ML
.1-.4 INJECTION, SOLUTION INTRAMUSCULAR; INTRAVENOUS; SUBCUTANEOUS
Status: DISCONTINUED | OUTPATIENT
Start: 2017-12-04 | End: 2017-12-06

## 2017-12-04 RX ORDER — FENTANYL CITRATE 50 UG/ML
25-50 INJECTION, SOLUTION INTRAMUSCULAR; INTRAVENOUS
Status: DISCONTINUED | OUTPATIENT
Start: 2017-12-04 | End: 2017-12-04 | Stop reason: HOSPADM

## 2017-12-04 RX ORDER — ONDANSETRON 2 MG/ML
INJECTION INTRAMUSCULAR; INTRAVENOUS PRN
Status: DISCONTINUED | OUTPATIENT
Start: 2017-12-04 | End: 2017-12-04

## 2017-12-04 RX ORDER — GLYCOPYRROLATE 0.2 MG/ML
INJECTION, SOLUTION INTRAMUSCULAR; INTRAVENOUS PRN
Status: DISCONTINUED | OUTPATIENT
Start: 2017-12-04 | End: 2017-12-04

## 2017-12-04 RX ORDER — ONDANSETRON 2 MG/ML
4 INJECTION INTRAMUSCULAR; INTRAVENOUS EVERY 30 MIN PRN
Status: DISCONTINUED | OUTPATIENT
Start: 2017-12-04 | End: 2017-12-04 | Stop reason: HOSPADM

## 2017-12-04 RX ORDER — SODIUM CHLORIDE, SODIUM LACTATE, POTASSIUM CHLORIDE, CALCIUM CHLORIDE 600; 310; 30; 20 MG/100ML; MG/100ML; MG/100ML; MG/100ML
INJECTION, SOLUTION INTRAVENOUS CONTINUOUS
Status: DISCONTINUED | OUTPATIENT
Start: 2017-12-04 | End: 2017-12-05 | Stop reason: CLARIF

## 2017-12-04 RX ORDER — LIDOCAINE HYDROCHLORIDE 20 MG/ML
INJECTION, SOLUTION INFILTRATION; PERINEURAL PRN
Status: DISCONTINUED | OUTPATIENT
Start: 2017-12-04 | End: 2017-12-04

## 2017-12-04 RX ORDER — OLANZAPINE 5 MG/1
5 TABLET, ORALLY DISINTEGRATING ORAL EVERY 6 HOURS PRN
Status: DISCONTINUED | OUTPATIENT
Start: 2017-12-04 | End: 2017-12-10 | Stop reason: HOSPADM

## 2017-12-04 RX ORDER — DEXAMETHASONE SODIUM PHOSPHATE 4 MG/ML
INJECTION, SOLUTION INTRA-ARTICULAR; INTRALESIONAL; INTRAMUSCULAR; INTRAVENOUS; SOFT TISSUE PRN
Status: DISCONTINUED | OUTPATIENT
Start: 2017-12-04 | End: 2017-12-04

## 2017-12-04 RX ORDER — LIDOCAINE 40 MG/G
CREAM TOPICAL
Status: DISCONTINUED | OUTPATIENT
Start: 2017-12-04 | End: 2017-12-06

## 2017-12-04 RX ORDER — HYDROMORPHONE HYDROCHLORIDE 1 MG/ML
.3-.5 INJECTION, SOLUTION INTRAMUSCULAR; INTRAVENOUS; SUBCUTANEOUS EVERY 5 MIN PRN
Status: DISCONTINUED | OUTPATIENT
Start: 2017-12-04 | End: 2017-12-04 | Stop reason: HOSPADM

## 2017-12-04 RX ORDER — FENTANYL CITRATE 50 UG/ML
INJECTION, SOLUTION INTRAMUSCULAR; INTRAVENOUS PRN
Status: DISCONTINUED | OUTPATIENT
Start: 2017-12-04 | End: 2017-12-04

## 2017-12-04 RX ORDER — SODIUM CHLORIDE, SODIUM LACTATE, POTASSIUM CHLORIDE, CALCIUM CHLORIDE 600; 310; 30; 20 MG/100ML; MG/100ML; MG/100ML; MG/100ML
INJECTION, SOLUTION INTRAVENOUS CONTINUOUS PRN
Status: DISCONTINUED | OUTPATIENT
Start: 2017-12-04 | End: 2017-12-04

## 2017-12-04 RX ORDER — QUETIAPINE FUMARATE 25 MG/1
25 TABLET, FILM COATED ORAL EVERY 6 HOURS PRN
Status: DISCONTINUED | OUTPATIENT
Start: 2017-12-04 | End: 2017-12-10 | Stop reason: HOSPADM

## 2017-12-04 RX ADMIN — GABAPENTIN 600 MG: 600 TABLET, FILM COATED ORAL at 16:19

## 2017-12-04 RX ADMIN — Medication 0.5 MG: at 04:48

## 2017-12-04 RX ADMIN — SODIUM CHLORIDE, POTASSIUM CHLORIDE, SODIUM LACTATE AND CALCIUM CHLORIDE: 600; 310; 30; 20 INJECTION, SOLUTION INTRAVENOUS at 23:57

## 2017-12-04 RX ADMIN — HEPARIN SODIUM 1050 UNITS/HR: 10000 INJECTION, SOLUTION INTRAVENOUS at 18:39

## 2017-12-04 RX ADMIN — LORAZEPAM 0.5 MG: 0.5 TABLET ORAL at 05:17

## 2017-12-04 RX ADMIN — FENTANYL CITRATE 25 MCG: 50 INJECTION, SOLUTION INTRAMUSCULAR; INTRAVENOUS at 07:36

## 2017-12-04 RX ADMIN — MIDAZOLAM HYDROCHLORIDE 1 MG: 1 INJECTION, SOLUTION INTRAMUSCULAR; INTRAVENOUS at 07:04

## 2017-12-04 RX ADMIN — CEFAZOLIN SODIUM 2 G: 2 INJECTION, SOLUTION INTRAVENOUS at 19:55

## 2017-12-04 RX ADMIN — Medication 5 MG: at 07:26

## 2017-12-04 RX ADMIN — SODIUM CHLORIDE 1000 ML: 9 INJECTION, SOLUTION INTRAVENOUS at 04:48

## 2017-12-04 RX ADMIN — DEXMEDETOMIDINE HYDROCHLORIDE 8 MCG: 100 INJECTION, SOLUTION INTRAVENOUS at 07:04

## 2017-12-04 RX ADMIN — GABAPENTIN 1200 MG: 300 CAPSULE ORAL at 21:19

## 2017-12-04 RX ADMIN — TAZOBACTAM SODIUM AND PIPERACILLIN SODIUM 3.38 G: 375; 3 INJECTION, SOLUTION INTRAVENOUS at 03:28

## 2017-12-04 RX ADMIN — GLYCOPYRROLATE 0.2 MG: 0.2 INJECTION, SOLUTION INTRAMUSCULAR; INTRAVENOUS at 07:44

## 2017-12-04 RX ADMIN — Medication 1 MG: at 21:20

## 2017-12-04 RX ADMIN — SODIUM CHLORIDE 1000 ML: 9 INJECTION, SOLUTION INTRAVENOUS at 06:15

## 2017-12-04 RX ADMIN — PHENYLEPHRINE HYDROCHLORIDE 0.25 MCG/KG/MIN: 10 INJECTION, SOLUTION INTRAMUSCULAR; INTRAVENOUS; SUBCUTANEOUS at 07:10

## 2017-12-04 RX ADMIN — SUCCINYLCHOLINE CHLORIDE 100 MG: 20 INJECTION, SOLUTION INTRAMUSCULAR; INTRAVENOUS at 07:10

## 2017-12-04 RX ADMIN — SODIUM CHLORIDE 1000 ML: 9 INJECTION, SOLUTION INTRAVENOUS at 00:22

## 2017-12-04 RX ADMIN — PROPOFOL 130 MG: 10 INJECTION, EMULSION INTRAVENOUS at 07:10

## 2017-12-04 RX ADMIN — Medication 0.5 MG: at 06:14

## 2017-12-04 RX ADMIN — ONDANSETRON 4 MG: 2 INJECTION INTRAMUSCULAR; INTRAVENOUS at 08:15

## 2017-12-04 RX ADMIN — SODIUM CHLORIDE, POTASSIUM CHLORIDE, SODIUM LACTATE AND CALCIUM CHLORIDE: 600; 310; 30; 20 INJECTION, SOLUTION INTRAVENOUS at 07:06

## 2017-12-04 RX ADMIN — DEXAMETHASONE SODIUM PHOSPHATE 4 MG: 4 INJECTION, SOLUTION INTRA-ARTICULAR; INTRALESIONAL; INTRAMUSCULAR; INTRAVENOUS; SOFT TISSUE at 08:15

## 2017-12-04 RX ADMIN — SODIUM CHLORIDE, POTASSIUM CHLORIDE, SODIUM LACTATE AND CALCIUM CHLORIDE: 600; 310; 30; 20 INJECTION, SOLUTION INTRAVENOUS at 07:04

## 2017-12-04 RX ADMIN — SODIUM CHLORIDE, POTASSIUM CHLORIDE, SODIUM LACTATE AND CALCIUM CHLORIDE: 600; 310; 30; 20 INJECTION, SOLUTION INTRAVENOUS at 12:53

## 2017-12-04 RX ADMIN — LIDOCAINE HYDROCHLORIDE 60 MG: 20 INJECTION, SOLUTION INFILTRATION; PERINEURAL at 07:10

## 2017-12-04 RX ADMIN — CEFAZOLIN SODIUM 2 G: 2 INJECTION, SOLUTION INTRAVENOUS at 07:30

## 2017-12-04 RX ADMIN — GLYCOPYRROLATE 0.2 MG: 0.2 INJECTION, SOLUTION INTRAMUSCULAR; INTRAVENOUS at 07:17

## 2017-12-04 RX ADMIN — CEFAZOLIN SODIUM 2 G: 2 INJECTION, SOLUTION INTRAVENOUS at 12:57

## 2017-12-04 ASSESSMENT — PAIN DESCRIPTION - DESCRIPTORS
DESCRIPTORS: SHARP
DESCRIPTORS: PRESSURE;THROBBING
DESCRIPTORS: CONSTANT;SHARP;SHOOTING

## 2017-12-04 NOTE — PROVIDER NOTIFICATION
MD Notification    Notified Person:  MD    Notified Persons Name: Gomez     Notification Date/Time: 12/4/17 0008    Notification Interaction:  Talked with Physician    Purpose of Notification: Low BP    Orders Received: 1000cc bolus NS.    Comments:

## 2017-12-04 NOTE — PROVIDER NOTIFICATION
MD Notification    Notified Person:  MD    Notified Persons Name: Emely MCDANIEL    Notification Date/Time: 12/3/17 2229    Notification Interaction:  Talked with Physician    Purpose of Notification: Hgb drop from 10.7 to 8.9    Orders Received: Hgb check at 0100.    Comments:

## 2017-12-04 NOTE — ANESTHESIA POSTPROCEDURE EVALUATION
Patient: Antonio Ramirez    Procedure(s):  Exploration of Left Leg Below Knee Amputation Stump with Ligation of Bleeders. - Wound Class: IV-Dirty or Infected    Diagnosis:left leg BKA bleeding  Diagnosis Additional Information: No value filed.    Anesthesia Type:  General, ETT    Note:  Anesthesia Post Evaluation    Patient location during evaluation: PACU  Patient participation: Able to fully participate in evaluation  Level of consciousness: awake  Pain management: adequate  Cardiovascular status: acceptable  Respiratory status: acceptable  Hydration status: acceptable  PONV: none     Anesthetic complications: None          Last vitals:  Vitals:    12/04/17 0950 12/04/17 1000 12/04/17 1030   BP: 90/60  102/66   Pulse:      Resp:  18 16   Temp:   36.6  C (97.8  F)   SpO2:  100% 98%         Electronically Signed By: NICK DIAZ  December 4, 2017  11:01 AM

## 2017-12-04 NOTE — PROVIDER NOTIFICATION
Brief update;    Paged re: asymptomatic hypotension    HR in 60s. BP <90 systolic    1L NS now    Von Gomez MD  12:14 AM

## 2017-12-04 NOTE — PROGRESS NOTES
[facility]    Infectious Disease Progress Note    Date of Service (when I saw the patient): 12/04/2017     Assessment & Plan   Antonio Ramirez is a 72 year old male who was admitted on 11/29/2017.       Impression:  1. 72 y.o male admitted with left BKA stump abscess. S/P I and D.   2. CAD, Peripheral neuropathy, alcohol abuse, CKD stage 3.   3. Noted GPC in the cultures. Further identified as MSSA     Recommendations:   Will stop zosyn even though covering MSSA Ancef preferred, and start Ancef.     Claire Salazar MD    Interval History   Remains afebrile GPC in the cultures further identified as MSSA     Physical Exam   Temp: 97.8  F (36.6  C) Temp src: Oral BP: 102/66   Heart Rate: 62 Resp: 16 SpO2: 98 % O2 Device: Nasal cannula Oxygen Delivery: 2 LPM  Vitals:    11/29/17 1629   Weight: 99.8 kg (220 lb)     Vital Signs with Ranges  Temp:  [97.8  F (36.6  C)-98.9  F (37.2  C)] 97.8  F (36.6  C)  Heart Rate:  [47-93] 62  Resp:  [12-20] 16  BP: ()/() 102/66  SpO2:  [93 %-100 %] 98 %    Constitutional: Awake, alert,  Lungs: Clear to auscultation bilaterally, no crackles or wheezing  Cardiovascular: Regular rate and rhythm, normal S1 and S2, and no murmur noted  Abdomen: Normal bowel sounds, soft, non-distended, non-tender  Skin: dressing on the stump   Other:    Medications     lactated ringers         sodium chloride (PF)  3 mL Intracatheter Q8H     ceFAZolin  2 g Intravenous Q8H     senna-docusate  1-2 tablet Oral BID     docusate sodium  100 mg Oral BID     potassium chloride SA  10 mEq Oral Daily     gabapentin (NEURONTIN) tablet 600 mg  600 mg Oral BID     carvedilol (COREG) tablet 3.125 mg  3.125 mg Oral BID w/meals     gabapentin  1,200 mg Oral At Bedtime       Data   All microbiology laboratory data reviewed.  Recent Labs   Lab Test  12/04/17   1014  12/04/17   0806  12/04/17   0550   12/03/17   0616   WBC   --   6.4  5.6   --   6.5   HGB  9.5*  8.0*  8.0*   < >  10.7*   HCT   --    23.0*  23.4*   --   31.9*   MCV   --   95  95   --   96   PLT   --   156  127*   --   121*    < > = values in this interval not displayed.     Recent Labs   Lab Test  12/04/17   0550  12/03/17   0616  12/02/17   0508   CR  1.43*  1.58*  1.48*     No lab results found.  Recent Labs   Lab Test  12/04/17   0801  12/01/17   1756  11/29/17   1811  11/29/17   1649   CULT  PENDING  PENDING  PENDING  Light growth  Staphylococcus aureus  This isolate is presumed to be clindamycin resistant based on detection of inducible   clindamycin resistance. Erythromycin and clindamycin are resistant, therefore, they are   not recommended for use.  *  Culture negative monitoring continues  No growth after 5 days  No growth after 5 days

## 2017-12-04 NOTE — PROVIDER NOTIFICATION
MD Notification    Notified Person:  MD    Notified Persons Name: Gomez    Notification Date/Time: 12/4/17 0406    Notification Interaction:  Talked with Physician    Purpose of Notification: Called on call for patient increased bleeding from wound. Concerned for patient      Orders Received:    Comments:

## 2017-12-04 NOTE — ANESTHESIA PREPROCEDURE EVALUATION
Anesthesia Evaluation     . Pt has had prior anesthetic.     History of anesthetic complications (crazy after stump revision, likely pain meds)          ROS/MED HX    ENT/Pulmonary: Comment: Hx of bilateral pulm emboli, on anticoagul;atiopnm     (-) sleep apnea   Neurologic: Comment: Balance problem    (+)neuropathy     Cardiovascular: Comment: Le thrombophlebiits    Echo 9/2016: EF 55%, mild ms, mod mr.  Improved ef since previous echo    (+) hypertension-range: well controlled, -CAD, -past MI (MI, s/p triple bypass, no cardiac issues since per patient),-. Taking blood thinners : . CHF Last EF: 45-50 date: 2017 . fainting (syncope). :. valvular problems/murmurs type: AS and MR . Previous cardiac testing Echodate:5/2017results:EF 45-50%, reduced RVSF, elevated pulmonary pressures (per notes, but no description of severity)Stress Testdate:2016 results:Negative for ischemiaECG reviewed date:11/2017 results:Afib date: results:          METS/Exercise Tolerance:     Hematologic: Comments: Anticardiolipin antibody    (+) History of blood clots pt is anticoagulated, -      Musculoskeletal:         GI/Hepatic:     (+) GERD (hx of it, but patient denies symptoms.  Patient denies) hiatal hernia, liver disease (etohc cirrhosis, with portal hypertension.  Per patient has not had ascities in the past year),       Renal/Genitourinary: Comment: Urinary stricture, stones    (+) chronic renal disease (stage 3), type: CRI,       Endo:         Psychiatric:         Infectious Disease:         Malignancy:         Other:                     Physical Exam  Normal systems: cardiovascular and pulmonary    Airway   Mallampati: II  TM distance: >3 FB  Neck ROM: full    Dental   Comment: native    Cardiovascular       Pulmonary                     Anesthesia Plan      History & Physical Review  History and physical reviewed and following examination; no interval change.    ASA Status:  3 emergent.    NPO Status:  Waived due to  emergency    Plan for General and ETT with Propofol induction. Maintenance will be Balanced.    PONV prophylaxis:  Ondansetron (or other 5HT-3) and Dexamethasone or Solumedrol       Postoperative Care      Consents  Anesthetic plan, risks, benefits and alternatives discussed with:  Patient..                          .

## 2017-12-04 NOTE — PLAN OF CARE
Pt continued to have copious amounts of bleeding from wound. On call hosp, Dr Gomez, paged because of RNs concern for increased bleeding. Dr gomez came to the floor to assess the patient. 2 flying squad nurses came and started 2 PIV. Hep drip was stopped at 0420. 2 units of blood and 3L of fluid were given over this time. BP in 80-90s/50s, HR in 60s, and afebrile.  Pt A&O. Pt declined lightheaded, dizziness, or SOB throughout. Manual pressure was held on the wound. Pt given dilaudid x2 for pain and ativan for anxiousness. Wife called by pt who arrived at the bedside within the hour.  Pt sent to preop for surgery today to stop bleeding.

## 2017-12-04 NOTE — PROGRESS NOTES
"Brief Note:    Paged by nursing staff: \"Concerned of Pt status.\"    Concern for ongoing bleeding from BKA. Bleeding throughout the day, worsened in the past hours.    Patient's blood pressure had been in the 90 systolic range dipping into the mid 80s systolic at times throughout the night, responded initially to saline bolus. Recheck hemoglobin at 7 for which orthopedic surgery was contacted and a unit of packed red blood cells was ordered.    Patient remained asymptomatic throughout this.    Paged to assess patient given concern, met with patient.    Patient is here with left BKA abscess drainage 12/1 by Dr. Howard with wound VAC placement.   Patient was placed on heparin drip postop day #0 for antiphospholipid antibody syndrome with history of significant clot extending into IVC. Of note, patient also with an IVC filter.    Wound VAC subsequently not functioning. I'm not entirely clear as to the timeframe of wound VAC malfunction, appears to have taking place around 3 PM 12/3.    12/3, shortly after midnight, house physician was contacted to evaluate increasing bleeding from around wound VAC. At that time, sanguinous drainage was noted. Leg was elevated and recommendation for Dr. Casper to be notified, question if heparin drip should be stopped. Heparin drip was discontinued by [facility] hospitalist at approximately 1 PM. Dr. Casper assessed patient at approximately 3 PM wound VAC alarming at that time. With wound VAC not functioning, dressing was lanced to release pressure with plan for full dressing takedown by Dr. Howard's team and wound vac nurse 12/4.    Blood pressures in the 100 systolic range at approximately 6 PM with hold parameters placed on carvedilol    At approximately midnight 12/4 blood pressure dipping into the mid 80s systolic range, patient asymptomatic. Fluid bolus ordered. Recheck hemoglobin of 7, packed red blood cells ordered by orthopedics. No significant improvement in blood pressure with " initiation of infusion. It is approximately this time that I was paged by nursing staff regarding concern for patient given ongoing bleeding.    At bedside, nursing staff present. Described recommendations for reinforcing dressing by orthopedics. Patient with multiple chucks soaked through with blood as well as multiple Kerlix rolls, approximately 6 inches thick wrapped around lower aspect of stump and saturated with blood. Heparin drip stopped at bedside ~4:10 AM (order to stop placed slightly later).    Saturated chucks removed from under leg, saturated Kerlix removed. While removing what was presumed to be the last layer of Kerlix over Ace bandage, arterial bleed noted at the inferior medial aspect of left BKA. Approximately 0.5 diameter squirt of arterial blood. No vessel visualized, actually appeared to have dressing overlying. Pressure was immediately manually held with multiple ABD pads folded in quarters placed over approximate site of bleeding. Twisted Kerlix roll applied under pressure overlying this and finally pressure dressing. Patient still asymptomatic at this time with systolic blood pressures in the 90s.    Orthopedic surgery and vascular surgery were paged given concern for active arterial bleed. Discussed with both vascular surgery and orthopedic surgery describing my concern that patient may require operative exploration this morning. Vascular surgery required orthopedic surgery to directly request their involvement in the case. I connected vascular fellow Dr. Lejeune with [place] physician assistant with orthopedic surgery.    Patient's spouse was contacted, and arrived at bedside. Discussed current findings with wife at bedside. Patient received his second unit of blood by this time, second liter of IV fluid. Following second unit of blood, patient less pallorous. Patient alert and conversant throughout encounter.    Met with Dr. Lejeune and orthopedic surgery physician assistant at bedside to  discuss findings.     On assessment at this time, approximately 5:45 AM, patient has some strikethrough on tightly applied Kerlix, though no significant strikethrough from ABD pads.     Patient consented for operative exploration by vascular surgery. ICU bed still pending, and patient was taken directly to preoperative room.     Appreciate assistance from surgical service regarding bed placement postop. Unclear if patient will require ICU level care following definitive intervention.    Plan:   Patient taken emergently to OR for wound exploration and control of bleeding  Bed placement postoperatively as per vascular surgery  Heparin drip has been discontinued, aspirin was also discontinued. Patient has an IVC filter in place.  Following definitive control of bleeding, patient should be initiated back on a heparin drip and monitored closely for rebleed. Patient has a history of antiphospholipid antibody syndrome    Von Gomez MD  6:48 AM    Greater than 2.5 hours overall in patient care on unit including coordination of surgical services. Greater than 90 minutes at bedside over multiple visits. Patient was constantly monitored at bedside from the time of my arrival until transfer to preoperative area by myself or flying squad nursing staff. Greater than 30 minutes critical care including holding direct pressure at arterial bleed site.

## 2017-12-04 NOTE — PROVIDER NOTIFICATION
[id number] Paged Vascular Md Ginger Mcmullen per MD Gomez requested about pt perfused  bleeding from stamp.

## 2017-12-04 NOTE — PLAN OF CARE
Problem: Patient Care Overview  Goal: Plan of Care/Patient Progress Review  OT: Orders rec'd and chart reviewed. Pt admitted due to I and D of L BKA, now with postop bleeding from stump, s/p ligation of bleeding vessels today. Will hold OT eval today and reschedule for tomorrow after PT.

## 2017-12-04 NOTE — PROGRESS NOTES
[facility]    Hospitalist Progress Note    Assessment & Plan   Antonio Ramirez is a 72 year old male with complex PMHx including CAD with hx of 3V CABG in 2007, CHF, paroxysmal afib, thoracic aneurysm, stage III CKD, alcoholic cirrhosis, peripheral neuropathy, hx of MGUS, hx of prostate Ca, antiphospholipid Ab syndrome with hx of DVT/PE and IVC filter placement on chronic anticoagulation with warfarin and hx of L BKA in 2014 who was admitted on 11/29/2017 with pain and increased swelling in his stump with reported fever and chills with US showing a 6cm fluid collection.     Suspected L BKA Abscess/Cellulitis, s/p I&D with placement of wound vac on 12/1/17:   Postop bleeding from stump, s/p ligation of bleeding vessels on 12/4/17  Reportedly saw a prosthetics specialist in [place] two weeks prior to admission who recommended a tighter prosthesis ruber sleeve, which the patient began wearing. 3-4d prior to admission he began to notice pain and swelling in his stump which continued to worsen. Endorsed fever and chills with Tmax 102 at home. Afebrile in ED, hemodynamically stable and WBC 11.0 on admission. Noted erythema of stump. US in ED showed a 6.6 c 3.1 x 1.6cm complex fluid collection concerning for abscess. Vanco and Zosyn started on admission.     -- see by ortho, underwent I&D on 12/1 with placement of wound vac  -- placed on heparin gtt on POD #0 given high risk for thrombosis  -- RNs observed wound vac noted to be clogging/not functioning appropriately 12/2-12/3 and ongoing oozing/bleeding observed around wound vac while on heparin gtt; heparin gtt held for few hours on 12/3, assessed by ortho that afternoon, Dr. Casper recommended to cont to wrap stump until wound vac could be better assessed on 12/4 AM; discussed with Dr. Casper at that time as to whether heparin gtt should be held, said it didn't need to be held from ortho perspective so it was resumed that afternoon   -- became hypotensive  overnight (12/3 PM-12/4 AM) requiring fluid boluses, hgb trended down to 7.0 on 12/4 AM; reassessed per hospitalist (12/4 AM) and exam concerning for arterial bleeding, ortho and vascular surgery services consulted -- patient ultimately taken to OR by vascular surgery for ligation of bleeding vessels  -- postop cares per ortho/vascular surgery including wound vac mvmt  -- intraop cultures from 12/1 subsequently grew MSSA, blood cultures remain neg -- ID following, changed from Zosyn to Ancef on 12/4  -- has prn oxycodone and IV dilaudid for pain control    Postop Delirium:  Becomes confused and agitated following anesthesia, as observed after initial I&D and vascular surgery this morning.   -- redirectable, can use Seroquel or Zyprexa if needed    Antiphospholipid Ab syndrome   Anticardiolipin Ab+. With hx of DVT/PE. Had IVC filter placed several years ago (which is reportedly thrombosed) and is presently on chronic anticoagulation with warfarin (monitors with chromogenic factor X levels). INR 2.53 on admission. Per initial labs, chromogenic factor X levels appear to be correlating with INR. INR reversed for I&D -- given 5mg Vitamin K on 11/29 PM, 2mg IV Vitamin K on 11/30 PM and an additional 1mg vitamin K on 12/1 AM.  -- given high risk for clot was placed on heparin gtt on POD #0 (12/1), warfarin held as it was anticipated he would need to be taken back to the OR in the coming days  -- with postop bleeding and oozing as above -- heparin gtt stopped 12/4 AM  -- resumption of heparin gtt when okay per vascular surgery    Anemia dt acute blood loss:  Hgb 14.0 on admission. Trended down postop with ongoing oozing/bleeding at wound vac site and concurrent use of heparin as above.  -- 11.1 on POD #1 -- 10.7 on POD #2 -- 7.0 on POD #3 (12/4 AM)   -- transfused 2U PRBCs in OR this morning with subsequent improvement in hgb to 10.2 this afternoon  -- can repeat hgb once more this evening -- if stable, no need for ongoing  serial checks, can repeat in AM    Thrombocytopenia:  Chronic, likely related to EtOH use. Baseline appears to be in 120s.   Platelets down to 106 on 11/30, likely dt infection, have since normalized.    CAD with hx of 3V CABG in 2007  Chronic Systolic CHF  Paroxysmal afib  Mild AS / Moderate MR  Hypertension / Hyperlipidemia  Echo through [facility] in 5/2017 showed EF 45-50% and reduced RVSF with elevated pulmonary pressures. Euvolemic on admission. No c/o chest pain or dyspnea on exertion. Had stress test in [place] in 2016 which was neg for ischemia (not able to review these records). Well managed with regimen of Coreg 3.125mg BID, aspirin 81mg daily, Repatha subQ every 2 wks (hx of statin intolerance) and torsemide 20mg BID.   -- conts on Coreg  -- torsemide on hold as of 12/1  -- aspirin held 12/4 given issues with bleeding    Stage III CKD:  Cr 1.25 on 11/1/17, had been 1.3 - 1.6 earlier this year. Cr 1.23 on admission  -- Cr with slow upward trend this stay -- peaked at 1.58 on 12/3, IVFs started 12/3 -- improved to 1.43 today  -- last dose ov Vanco was given on 12/2  -- torsemide on hold as of 12/1    Alcoholic cirrhosis  Reportedly drinks 2 drinks per day. AST nl this stay.   -- no s/sx of withdrawal this stay    Peripheral Neuropathy:  EtOH use thought to be contributing.   Sx stable on gabapentin this stay    Hyponatremia: Resolved  Hypokalemia: Resolved  Na 131 on admission. Normalized with po intake.  Has scheduled K replacement while on diuretics, K replacement protocol also ordered    FEN: cont LR @100ml/h until more alert and adequately taking po, lytes as above, cardiac diet  DVT Prophylaxis: PCD, resume heparin gtt when okay per vascular surgery  Code Status: Full Code    Disposition: Pending postop course -- likely several days still.     Omaira Coleman    Interval History   Overnight events noted, ongoing oozing/bleeding from L BKA stump. Assessment per overnight hospitalist with concern  for arterial bleeding -- vascular surgery called and was taken to the OR this morning for ligation of bleeding vessels. Transfused 2U intraop. Per RN, has been confused, intermittently agitated postop, pulling at capnography -- similar behaviors noted earlier this stay after surgery/anesthesia. I saw the patient in his room postoperatively. He was sleeping soundly and did not wake during my visit. Appeared comfortable, NAD. Breathing nonlabored.    -Data reviewed today: I reviewed all new labs and imaging results over the last 24 hours. I personally reviewed no images or EKG's today.    Physical Exam   Temp: 97.8  F (36.6  C) Temp src: Oral BP: 104/57   Heart Rate: 61 Resp: 15 SpO2: 98 % O2 Device: Nasal cannula Oxygen Delivery: 2 LPM  Vitals:    11/29/17 1629   Weight: 99.8 kg (220 lb)     Vital Signs with Ranges  Temp:  [97.8  F (36.6  C)-98.9  F (37.2  C)] 97.8  F (36.6  C)  Heart Rate:  [47-93] 61  Resp:  [10-20] 15  BP: ()/() 104/57  SpO2:  [93 %-100 %] 98 %  I/O last 3 completed shifts:  In: 1458.13 [I.V.:858.13]  Out: 200 [Urine:200]    Constitutional: Resting comfortably, NAD  Respiratory: CTAB, no wheeze/rales/rhonchi, no increased work of breathing  Cardiovascular: HRRR w/++SM throughout precordium, no RLE edema  GI: S, NT, ND, +BS  Skin/Integumen: warm/dry, wound vac in place over L stump and not examined by me  Other:      Medications     lactated ringers 100 mL/hr at 12/04/17 1253       sodium chloride (PF)  3 mL Intracatheter Q8H     ceFAZolin  2 g Intravenous Q8H     senna-docusate  1-2 tablet Oral BID     docusate sodium  100 mg Oral BID     potassium chloride SA  10 mEq Oral Daily     gabapentin (NEURONTIN) tablet 600 mg  600 mg Oral BID     carvedilol (COREG) tablet 3.125 mg  3.125 mg Oral BID w/meals     gabapentin  1,200 mg Oral At Bedtime       Data     Recent Labs  Lab 12/04/17  1410 12/04/17  1014 12/04/17  0806 12/04/17  0550  12/03/17  0616 12/02/17  0508 12/01/17  1516  12/01/17  0712  11/30/17  0747   WBC  --   --  6.4 5.6  --  6.5  --   --  8.3  --  7.8   HGB 10.2* 9.5* 8.0* 8.0*  < > 10.7* 11.1*  --  12.3*  --  12.5*   MCV  --   --  95 95  --  96  --   --  94  --  94   PLT  --   --  156 127*  --  121*  --   --  116*  --  106*   INR  --   --  1.65*  --   --   --   --  1.65* 1.91*  --  2.56*   NA  --   --   --  138  --  133  --   --  133  --  134   POTASSIUM  --   --   --  4.2  --  4.2  --   --  3.7  < > 3.1*   CHLORIDE  --   --   --  105  --  98  --   --  99  --  99   CO2  --   --   --  26  --  29  --   --  26  --  26   BUN  --   --   --  18  --  23  --   --  26  --  25   CR  --   --   --  1.43*  --  1.58* 1.48*  --  1.43*  --  1.27*   ANIONGAP  --   --   --  7  --  6  --   --  8  --  9   BENNIE  --   --   --  7.5*  --  8.5  --   --  8.3*  --  8.3*   GLC  --   --   --  104*  --  90 132*  --  93  --  95   AST  --   --   --   --   --   --   --   --   --   --  18   < > = values in this interval not displayed.    No results found for this or any previous visit (from the past 24 hour(s)).

## 2017-12-04 NOTE — BRIEF OP NOTE
[facility] Brief Operative Note    Pre-operative diagnosis: left leg BKA bleeding   Post-operative diagnosis Left below knee amputation site bleeding   Procedure: Procedure(s):  Exploration of Left Leg Below Knee Amputation Stump with Ligation of Bleeders. - Wound Class: IV-Dirty or Infected   Surgeon(s): Surgeon(s) and Role:     * Leandro Arreola MD - Primary     * Lejeune, Stacey, MD - Assisting   Estimated blood loss: 150 mL    Specimens:   ID Type Source Tests Collected by Time Destination   1 : Left Leg Below Knee Amputation Wound Wound Leg, left ANAEROBIC BACTERIAL CULTURE, FUNGUS CULTURE, GRAM STAIN, WOUND CULTURE AEROBIC BACTERIAL Leandro Arreola MD 12/4/2017  8:01 AM       Findings: Multiple small areas of diffuse bleeding, possible small branch of posterior tibial artery ligated, Wound vac replaced set to 75 mm Hg  Cultures sent, 2 unit pRBC given in OR, low dose zoraida drip to maintain BP

## 2017-12-04 NOTE — PROGRESS NOTES
Paged by nursing regarding drop in Hgb. Chart reviewed. Nursing notes ongoing bleeding from wound. Pt completely asymptomatic. Hemodynamically stable aside from mild bradycardia. Instructed to recheck Hgb one more time around 0100. Wound nurse to assess patient in the AM. Page if pt should develop sx including dizziness, lightheadedness, chest pain, SOB.     Recent Labs  Lab 12/03/17  1900 12/03/17  0616 12/02/17  0508 12/01/17  0712 11/30/17  0747   HGB 8.9* 10.7* 11.1* 12.3* 12.5*

## 2017-12-04 NOTE — CONSULTS
Vascular Surgery Consult    Reason for consult: Bleeding left below knee amputation    HPI:   73 yo male with left BKA ~ 4 years ago per wife in [place] for worsening foot infection.  He then had a revision approximately 18 months ago also in [place] and now is POD#3 s/p left BKA I & D by ortho with wound vac placement.  Patient is on coumadin at home for antiphospholipid syndrome and was noted to have complications with his wound vac yesterday when it was turned off.  I was called this morning for increased bleeding and the wound was noted to have pulsatile flow when attempting to take down the dressing.  I evaluated the patient with ortho JONAS Roman at bedside and patient's Left BKA site had a saturated dressing in addition to a large amount of blood noted on the pads.  Patient was given 2 units of pRBC and bolused with IVF.  He is currently mentating appropriately with HR 60-70s on betablocker, BP 80-110s.      Per patient's wife he did have a fall and concern that the area was due to hematoma initially.  Patient has been on Zosyn for concern of infection but cultures from OR on 12/1 have no growth.      PMH:   Past Medical History:   Diagnosis Date     Alcoholism (H)      Amputated left leg (H)      Anemia      Anticardiolipin antibody syndrome (H)      Antiplatelet or antithrombotic long-term use      Aortic stenosis      Arthritis      Balance problem      Cardiomyopathy (H)      Coagulation disorder (H)     cardiolipinantibody syndrome     Coronary artery disease      Gastro-oesophageal reflux disease      Heart attack 2007     Hiatal hernia      Hypercholesterolemia      Hypertension      Left foot drop      Liver disease     alcoholic liver disease, alcoholic cirrohsosis, portal hypertension     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)     ?When diagnosed, patient not aware of this     Moderate mitral regurgitation      Monoclonal gammopathy      Neuropathy      Noninfectious ileitis      diverticulitis of colon     PE (pulmonary embolism) 2006     Prostate cancer (H) 2000    Treated with radiation (seed implants in 2000) -- no problems since     Renal disease     kidney stones     Syncope      Thoracic aortic aneurysm, without rupture      Thrombocytopenia (H)      Thrombosis of leg      Urethral stricture        PSH:   Past Surgical History:   Procedure Laterality Date     AMPUTATE LEG BELOW KNEE Left 04/2014    related to gangrene, started as foot ulcer related to neuropathy     APPENDECTOMY       ARTHROPLASTY KNEE Right 9/19/2014    Procedure: ARTHROPLASTY KNEE;  Surgeon: Saima Howard MD;  Location:  OR     CARDIAC SURGERY      CABG     CHOLECYSTECTOMY       COLONOSCOPY       CYSTOSCOPY, DILATE URETHRA, COMBINED N/A 10/12/2016    Procedure: COMBINED CYSTOSCOPY, DILATE URETHRA;  Surgeon: Dimitry Bello MD;  Location:  OR     ENDOSCOPIC STRIPPING VEIN(S)       esophagogastroduodenoscopy       EYE SURGERY      cataracts     GENITOURINARY SURGERY      Prostate Seen Implants     HERNIA REPAIR      Umbilical     IR IVC FILTER PLACEMENT       ORTHOPEDIC SURGERY      (R) knee scope     ORTHOPEDIC SURGERY      total hip      ORTHOPEDIC SURGERY      elbow surgery       MEDS:   Current Facility-Administered Medications   Medication     0.9% sodium chloride BOLUS     ceFAZolin (ANCEF) intermittent infusion 2 g in 100 mL dextrose PRE-MIX     ceFAZolin (ANCEF) intermittent infusion 1 g (pre-mix)     0.9% sodium chloride infusion     [Auto Hold] melatonin tablet 1 mg     [Auto Hold] lidocaine 1 % 1 mL     [Auto Hold] lidocaine (LMX4) cream     [Auto Hold] sodium chloride (PF) 0.9% PF flush 3 mL     [Auto Hold] sodium chloride (PF) 0.9% PF flush 3 mL     [Auto Hold] naloxone (NARCAN) injection 0.1-0.4 mg     [Auto Hold] HYDROmorphone (PF) (DILAUDID) injection 0.3-0.5 mg     [Auto Hold] oxyCODONE IR (ROXICODONE) tablet 5-10 mg     [Auto Hold] hydrOXYzine (ATARAX) tablet 10 mg     [Auto Hold]  "ondansetron (ZOFRAN-ODT) ODT tab 4 mg    Or     [Auto Hold] ondansetron (ZOFRAN) injection 4 mg     [Auto Hold] senna-docusate (SENOKOT-S;PERICOLACE) 8.6-50 MG per tablet 1-2 tablet     [Auto Hold] potassium chloride SA (K-DUR/KLOR-CON M) CR tablet 20-40 mEq     [Auto Hold] potassium chloride (KLOR-CON) Packet 20-40 mEq     [Auto Hold] potassium chloride 10 mEq in 100 mL sterile water intermittent infusion (premix)     [Auto Hold] potassium chloride 10 mEq in 100 mL intermittent infusion with 10 mg lidocaine     [Auto Hold] potassium chloride 20 mEq in 50 mL intermittent infusion     [Auto Hold] LORazepam (ATIVAN) tablet 0.5-1 mg     [Auto Hold] docusate sodium (COLACE) capsule 100 mg     [Auto Hold] potassium chloride SA (K-DUR/KLOR-CON M) CR tablet 10 mEq     [Auto Hold] gabapentin (NEURONTIN) tablet 600 mg     [Auto Hold] carvedilol (COREG) tablet 3.125 mg     [Auto Hold] acetaminophen (TYLENOL) tablet 650 mg     [Auto Hold] polyethylene glycol (MIRALAX/GLYCOLAX) Packet 17 g     [Auto Hold] piperacillin-tazobactam (ZOSYN) infusion 3.375 g     [Auto Hold] gabapentin (NEURONTIN) capsule 1,200 mg       ALLG:    Allergies   Allergen Reactions     Hmg-Coa-R Inhibitors Other (See Comments)     Rhabdomyolysis occurred within a couple days     Ciprofloxacin Itching     Severe itching \"like ants were crawling\"       FH:   Family History   Problem Relation Age of Onset     Lung Cancer Mother      Heart Failure Father       age 87       SH:   Social History     Social History     Marital status:      Spouse name: N/A     Number of children: 2     Years of education: N/A     Occupational History     Retired       Social History Main Topics     Smoking status: Never Smoker     Smokeless tobacco: Not on file     Alcohol use Yes      Comment: quit 10/2015; now 2-3 Vodkas/night     Drug use: No     Sexual activity: Not on file     Other Topics Concern     Not on file     Social History Narrative    " , 2 children, retired . He does not have a living will but desires full code.  (last updated 11/29/2017)        REVIEW OF SYSTEMS:  GENERAL: No fevers, chills, weight gain or loss  HEENT: No vision or hearing difficulties, no nasal symptoms  RESPIRATORY: No cough wheeze, or hemoptosys  CARDIAC: No chest pain, orthopnea, lower extremity edema, irregular heartbeat  GI: No abdominal pain, difficulty swallowing, nausea or vomiting  : no dysuria, urgency, frequency or hematuria  HEME/ONC: + abnormal bleeding, history of hypercoaguable state  NEURO: No numbness, weakness, dizziness or loss of consciousness  MUSCULOSKELETAL: No Joint or back pain  SKIN: + recent BKA infection, no rashes, growths, sores, itching or hair loss    PHYSICAL EXAM:  Vitals: B/P: 99/59, T: 97.8, P: 60, R: 16  Gen: Alert oriented no acute distress  HEENT: Mucous membranes moist  Resp: Clear to auscultation bilaterally  CV: Regular rate rhythm, no murmurs, rubs or gallops  Abd: Soft, non-tender, non-distended no guarding or rigidity  Neuro:  Neuro intact, moves all extremities 4/4 strength equal  Ext: Left BKA dressing saturated, wound vac turned off, mild edema of lower extremity      Labs: Most recent labs reviewed  WBC   Date Value Ref Range Status   12/04/2017 5.6 4.0 - 11.0 10e9/L Final   ]  Hgb 7 at 2am, now 8 at 0550  Cultures from OR on 12/1 negative to date    IMAGING: no recent.    A/P:  71 yo male with left BKA  POD#3 from I & D with acute active bleeding overnight.    To OR for control of bleeding, consent obtained with patient and wife.  Serial Hgb, transfusion prn  NPO, IVF, continue antibiotics follow up cultures.    Keily Mandujano MD  Vascular Surgery Fellow  Pager [phone]

## 2017-12-04 NOTE — PLAN OF CARE
Problem: Patient Care Overview  Goal: Plan of Care/Patient Progress Review  Dr Casper and Dr Coleman agreed to restart heparin gtt. Dr Casper ordered to turn off wound vacuum, turned off at 1500. Hgb change from 10.7 this morning to 8.9 at 1900. Night nurse notified. He will call hospitalist to update drop in hgb. Large amount of sanguinous drainage from stump. This nurse asked Dr Casper if we should call him if drainage goes through dressing. He ordered to reinforced as needed.  Dressing reinforced at about 1830.  Left leg elevated on pillows. Carvedilol held due to soft BP and bradycardia  (HR 53). Oxycodone given for pain.

## 2017-12-04 NOTE — PLAN OF CARE
Problem: Patient Care Overview  Goal: Plan of Care/Patient Progress Review  Outcome: No Change  Patient back from surgery. VSS, 2L NC. Capnography alarming frequently, sometimes patient removes nasal cannula, other times pulse ox not reading correctly, and a few apneic episodes. Pt is very lethargic. Just starting to drink some clears, tolerating. Denies pain. CMS intact except baseline neuropathy to BUE/BLE. Wound vac patent at 75, no drainage or bleeding. Erythema surround wound vac. Refusing repositioning currently. Due to void. Disoriented to place and situation, intermittently restless and taking off capnography. Hgb remains stable. Continue to monitor.

## 2017-12-04 NOTE — ANESTHESIA CARE TRANSFER NOTE
Patient: Antonio Ramirez    Procedure(s):  Exploration of Left Leg Below Knee Amputation Stump with Ligation of Bleeders. - Wound Class: IV-Dirty or Infected    Diagnosis: left leg BKA bleeding  Diagnosis Additional Information: No value filed.    Anesthesia Type:   General, ETT     Note:  Airway :Face Mask  Patient transferred to:PACU  Comments: Shonda Report: Identifed the Patient, Identified the Reponsible Provider, Reviewed the pertinent medical history, Discussed the surgical course, Reviewed Intra-OP anesthesia mangement and issues during anesthesia, Set expectations for post-procedure period and Allowed opportunity for questions and acknowledgement of understanding      Vitals: (Last set prior to Anesthesia Care Transfer)    CRNA VITALS  12/4/2017 0802 - 12/4/2017 0845      12/4/2017             Resp Rate (set): 10                Electronically Signed By: DIPAK Landa CRNA  December 4, 2017  8:45 AM

## 2017-12-04 NOTE — OR NURSING
Patient initially started attempting to crawl out of bed so he could urinate. When questioned he could remember he had a catheter. Then he started pulled at tape to IV's and removing capnography stating his CO2 was just fine. Verbal agreement was made with patient that if I removed catheter he would leave other wires and tubings alone. Patient has been sleeping since catheter removed.

## 2017-12-04 NOTE — PROGRESS NOTES
"Antonio Ramirez  2017  POD # 3 s/p I&D of left BKA abscess and placement of wound vac  Admit Date:  2017    Objective:Patient states it has been bleeding severely for the past hour. Nursing said that it has been constantly bleeding for over a day now. Patient had no further complaints this morning.   Blood pressure 111/58, pulse 60, temperature 98.3  F (36.8  C), temperature source Oral, resp. rate 18, height 1.854 m (6' 1\"), weight 99.8 kg (220 lb), SpO2 98 %.    Temperatures:  Current - Temp: 98.3  F (36.8  C); Max - Temp  Av.3  F (36.8  C)  Min: 97.5  F (36.4  C)  Max: 98.9  F (37.2  C)  Pulse range: No Data Recorded  Blood pressure range: Systolic (24hrs), Av , Min:83 , Max:134   ; Diastolic (24hrs), Av, Min:46, Max:102    Exam:  CMS: intact  alert, stable, wound ok  Dressing saturated with hematogenous drainage. Nursing reports that this morning when dressing was examined, arterial blood almost hit them in the face. Hospitalist reports that over a 1cm of spray was noted coming through the dressing this morning. Vascular was notified and is on their way up to see the patient. Hgb levels dropped to 7 and a transfusion was ordered. He is being transferred down to the ICU for closer monitoring and will be taken to the OR if necessary.     Labs:  Recent Labs   Lab Test  17   0616  17   0712  17   1738   POTASSIUM  4.2  3.7  3.7     Recent Labs   Lab Test  17   0155  17   1900  17   0616   HGB  7.0*  8.9*  10.7*     Recent Labs   Lab Test  17   1516  17   0712  17   0747   INR  1.65*  1.91*  2.56*     Recent Labs   Lab Test  17   0616  17   0712  17   0747   PLT  121*  116*  106*       PLAN: Heparin has been stopped. He had not been transitioned to coumadin yet. He was also on aspirin according to the hospitalist. This has been stopped also. Vascular is involved and are on their way to see the patient now. We will " continue to follow closely. From on ortho standpoint, we will agree with whatever is necessary from a vascular standpoint to stop the bleed and stabilize the patient.

## 2017-12-05 ENCOUNTER — APPOINTMENT (OUTPATIENT)
Dept: PHYSICAL THERAPY | Facility: CLINIC | Age: 72
DRG: 464 | End: 2017-12-05
Payer: MEDICARE

## 2017-12-05 ENCOUNTER — APPOINTMENT (OUTPATIENT)
Dept: OCCUPATIONAL THERAPY | Facility: CLINIC | Age: 72
DRG: 464 | End: 2017-12-05
Attending: INTERNAL MEDICINE
Payer: MEDICARE

## 2017-12-05 LAB
ANION GAP SERPL CALCULATED.3IONS-SCNC: 8 MMOL/L (ref 3–14)
BACTERIA SPEC CULT: NO GROWTH
BACTERIA SPEC CULT: NO GROWTH
BUN SERPL-MCNC: 15 MG/DL (ref 7–30)
CALCIUM SERPL-MCNC: 8.2 MG/DL (ref 8.5–10.1)
CHLORIDE SERPL-SCNC: 106 MMOL/L (ref 94–109)
CO2 SERPL-SCNC: 23 MMOL/L (ref 20–32)
CREAT SERPL-MCNC: 1.08 MG/DL (ref 0.66–1.25)
ERYTHROCYTE [DISTWIDTH] IN BLOOD BY AUTOMATED COUNT: 15.4 % (ref 10–15)
GFR SERPL CREATININE-BSD FRML MDRD: 67 ML/MIN/1.7M2
GLUCOSE SERPL-MCNC: 121 MG/DL (ref 70–99)
HCT VFR BLD AUTO: 27.9 % (ref 40–53)
HGB BLD-MCNC: 9.5 G/DL (ref 13.3–17.7)
LMWH PPP CHRO-ACNC: 0.14 IU/ML
LMWH PPP CHRO-ACNC: 0.35 IU/ML
Lab: NORMAL
Lab: NORMAL
MCH RBC QN AUTO: 30.9 PG (ref 26.5–33)
MCHC RBC AUTO-ENTMCNC: 34.1 G/DL (ref 31.5–36.5)
MCV RBC AUTO: 91 FL (ref 78–100)
PLATELET # BLD AUTO: 142 10E9/L (ref 150–450)
POTASSIUM SERPL-SCNC: 4.2 MMOL/L (ref 3.4–5.3)
RBC # BLD AUTO: 3.07 10E12/L (ref 4.4–5.9)
SODIUM SERPL-SCNC: 137 MMOL/L (ref 133–144)
SPECIMEN SOURCE: NORMAL
SPECIMEN SOURCE: NORMAL
WBC # BLD AUTO: 7.7 10E9/L (ref 4–11)

## 2017-12-05 PROCEDURE — A9270 NON-COVERED ITEM OR SERVICE: HCPCS | Mod: GY | Performed by: INTERNAL MEDICINE

## 2017-12-05 PROCEDURE — 36415 COLL VENOUS BLD VENIPUNCTURE: CPT | Performed by: SURGERY

## 2017-12-05 PROCEDURE — A9270 NON-COVERED ITEM OR SERVICE: HCPCS | Mod: GY | Performed by: PHYSICIAN ASSISTANT

## 2017-12-05 PROCEDURE — 25000132 ZZH RX MED GY IP 250 OP 250 PS 637: Mod: GY | Performed by: ORTHOPAEDIC SURGERY

## 2017-12-05 PROCEDURE — 36415 COLL VENOUS BLD VENIPUNCTURE: CPT | Performed by: INTERNAL MEDICINE

## 2017-12-05 PROCEDURE — 99233 SBSQ HOSP IP/OBS HIGH 50: CPT | Performed by: INTERNAL MEDICINE

## 2017-12-05 PROCEDURE — 25000132 ZZH RX MED GY IP 250 OP 250 PS 637: Mod: GY | Performed by: PHYSICIAN ASSISTANT

## 2017-12-05 PROCEDURE — 40000133 ZZH STATISTIC OT WARD VISIT: Performed by: OCCUPATIONAL THERAPIST

## 2017-12-05 PROCEDURE — 85520 HEPARIN ASSAY: CPT | Performed by: SURGERY

## 2017-12-05 PROCEDURE — 40000193 ZZH STATISTIC PT WARD VISIT: Performed by: PHYSICAL THERAPIST

## 2017-12-05 PROCEDURE — 97166 OT EVAL MOD COMPLEX 45 MIN: CPT | Mod: GO | Performed by: OCCUPATIONAL THERAPIST

## 2017-12-05 PROCEDURE — 25000132 ZZH RX MED GY IP 250 OP 250 PS 637: Mod: GY | Performed by: INTERNAL MEDICINE

## 2017-12-05 PROCEDURE — 85027 COMPLETE CBC AUTOMATED: CPT | Performed by: INTERNAL MEDICINE

## 2017-12-05 PROCEDURE — E2402 NEG PRESS WOUND THERAPY PUMP: HCPCS

## 2017-12-05 PROCEDURE — 12000007 ZZH R&B INTERMEDIATE

## 2017-12-05 PROCEDURE — 97110 THERAPEUTIC EXERCISES: CPT | Mod: GP | Performed by: PHYSICAL THERAPIST

## 2017-12-05 PROCEDURE — 80048 BASIC METABOLIC PNL TOTAL CA: CPT | Performed by: INTERNAL MEDICINE

## 2017-12-05 PROCEDURE — 25000128 H RX IP 250 OP 636

## 2017-12-05 PROCEDURE — A9270 NON-COVERED ITEM OR SERVICE: HCPCS | Mod: GY | Performed by: ORTHOPAEDIC SURGERY

## 2017-12-05 PROCEDURE — 25000128 H RX IP 250 OP 636: Performed by: ORTHOPAEDIC SURGERY

## 2017-12-05 PROCEDURE — 97535 SELF CARE MNGMENT TRAINING: CPT | Mod: GO | Performed by: OCCUPATIONAL THERAPIST

## 2017-12-05 PROCEDURE — 97530 THERAPEUTIC ACTIVITIES: CPT | Mod: GO | Performed by: OCCUPATIONAL THERAPIST

## 2017-12-05 PROCEDURE — 25000128 H RX IP 250 OP 636: Performed by: INTERNAL MEDICINE

## 2017-12-05 RX ORDER — GABAPENTIN 600 MG/1
600 TABLET ORAL
Status: DISCONTINUED | OUTPATIENT
Start: 2017-12-05 | End: 2017-12-10 | Stop reason: HOSPADM

## 2017-12-05 RX ORDER — GABAPENTIN 600 MG/1
2400 TABLET ORAL EVERY 24 HOURS
Status: DISCONTINUED | OUTPATIENT
Start: 2017-12-06 | End: 2017-12-10 | Stop reason: HOSPADM

## 2017-12-05 RX ORDER — GABAPENTIN 600 MG/1
600 TABLET ORAL 2 TIMES DAILY
Status: DISCONTINUED | OUTPATIENT
Start: 2017-12-05 | End: 2017-12-05

## 2017-12-05 RX ORDER — POLYETHYLENE GLYCOL 3350 17 G/17G
17 POWDER, FOR SOLUTION ORAL 2 TIMES DAILY PRN
Status: DISCONTINUED | OUTPATIENT
Start: 2017-12-05 | End: 2017-12-07

## 2017-12-05 RX ORDER — DOCUSATE SODIUM 100 MG/1
200 CAPSULE, LIQUID FILLED ORAL 2 TIMES DAILY
Status: DISCONTINUED | OUTPATIENT
Start: 2017-12-05 | End: 2017-12-10 | Stop reason: HOSPADM

## 2017-12-05 RX ORDER — GABAPENTIN 600 MG/1
600 TABLET ORAL ONCE
Status: COMPLETED | OUTPATIENT
Start: 2017-12-05 | End: 2017-12-05

## 2017-12-05 RX ORDER — GABAPENTIN 300 MG/1
1200 CAPSULE ORAL 2 TIMES DAILY
Status: DISCONTINUED | OUTPATIENT
Start: 2017-12-06 | End: 2017-12-10 | Stop reason: HOSPADM

## 2017-12-05 RX ORDER — GABAPENTIN 300 MG/1
1200 CAPSULE ORAL EVERY 24 HOURS
Status: DISCONTINUED | OUTPATIENT
Start: 2017-12-05 | End: 2017-12-05

## 2017-12-05 RX ORDER — LACTOBACILLUS RHAMNOSUS GG 10B CELL
1 CAPSULE ORAL DAILY
Status: DISCONTINUED | OUTPATIENT
Start: 2017-12-05 | End: 2017-12-10 | Stop reason: HOSPADM

## 2017-12-05 RX ADMIN — GABAPENTIN 600 MG: 600 TABLET, FILM COATED ORAL at 14:22

## 2017-12-05 RX ADMIN — CEFAZOLIN SODIUM 2 G: 2 INJECTION, SOLUTION INTRAVENOUS at 03:03

## 2017-12-05 RX ADMIN — GABAPENTIN 600 MG: 600 TABLET, FILM COATED ORAL at 20:17

## 2017-12-05 RX ADMIN — CARVEDILOL 3.12 MG: 3.12 TABLET, FILM COATED ORAL at 09:17

## 2017-12-05 RX ADMIN — SENNOSIDES AND DOCUSATE SODIUM 2 TABLET: 8.6; 5 TABLET ORAL at 09:17

## 2017-12-05 RX ADMIN — Medication 1 CAPSULE: at 14:22

## 2017-12-05 RX ADMIN — Medication 0.5 MG: at 20:08

## 2017-12-05 RX ADMIN — HEPARIN SODIUM 1200 UNITS/HR: 10000 INJECTION, SOLUTION INTRAVENOUS at 09:26

## 2017-12-05 RX ADMIN — DOCUSATE SODIUM 100 MG: 100 CAPSULE, LIQUID FILLED ORAL at 09:17

## 2017-12-05 RX ADMIN — CEFAZOLIN SODIUM 2 G: 2 INJECTION, SOLUTION INTRAVENOUS at 11:08

## 2017-12-05 RX ADMIN — POTASSIUM CHLORIDE 10 MEQ: 750 TABLET, EXTENDED RELEASE ORAL at 09:18

## 2017-12-05 RX ADMIN — GABAPENTIN 600 MG: 600 TABLET, FILM COATED ORAL at 09:17

## 2017-12-05 RX ADMIN — Medication 2400 UNITS: at 01:57

## 2017-12-05 RX ADMIN — CEFAZOLIN SODIUM 2 G: 2 INJECTION, SOLUTION INTRAVENOUS at 18:18

## 2017-12-05 RX ADMIN — GABAPENTIN 600 MG: 600 TABLET, FILM COATED ORAL at 18:17

## 2017-12-05 RX ADMIN — GABAPENTIN 600 MG: 600 TABLET, FILM COATED ORAL at 20:15

## 2017-12-05 ASSESSMENT — ACTIVITIES OF DAILY LIVING (ADL): PREVIOUS_RESPONSIBILITIES: MEDICATION MANAGEMENT

## 2017-12-05 NOTE — PROGRESS NOTES
SPIRITUAL HEALTH SERVICES Progress Note  [facility]    SH, LOS attempted visit. The patient and spouse were sleeping.     SH will follow as LOS persists.      Jeffrey Zelaya  Chaplain Resident

## 2017-12-05 NOTE — PROVIDER NOTIFICATION
TORB Dr. LeJeune @ 17:53 on 12/4/17--Start CV/Vascular (LOW intensity Heparin drip with Heparin 10a goal range = 0.15-0.35 IU/mL.  No loading dose but may bolus to adjust.  Guerita Denson [facility]

## 2017-12-05 NOTE — OP NOTE
DATE OF PROCEDURE:  12/04/2017      PREOPERATIVE DIAGNOSIS:  Open left below-knee amputation stump with bleeding.      POSTOPERATIVE DIAGNOSIS:  Open left below-knee amputation stump with bleeding.      PROCEDURE:   1.  Exploration of left below-knee amputation stump with ligation of bleeders.   2.  Placement of wound VAC on left below-knee amputation stump.        SURGEON:  Leandro Arreola MD      :   STACEY LEJEUNE MD      ANESTHESIA:  General endotracheal anesthesia.      ESTIMATED BLOOD LOSS:  150 mL      OPERATIVE INDICATIONS:  Antonio Raimrez is a 72-year-old gentleman who is status [facility]  He presented to our facility last week and was evaluated by the orthopedic surgery service for a probable abscess of the left BKA stump.  He was taken to the operating room three days ago and this area was debrided, irrigated and placed in a wound vacuum.  Late last evening he began bleeding briskly from around the end of the stump.  The dressing was reinforced, but the bleeding persisted.  He required transfusion of 2 units of packed red cells and the vascular surgical service was consulted.  The dressing is saturated and we have recommended proceeding to the operating room for exploration.  Of note, he is chronically anticoagulated for an antiphospholipid antibody syndrome.      OPERATIVE FINDINGS:  Multiple areas of diffuse bleeding with a small arterial pumper on the medial aspect consistent either with a small posterior tibial artery or perhaps a branch off of the posterior tibial artery.  That was suture ligated.  There were few venous lumen noted in the subcutaneous tissues including probably the small saphenous vein.  These areas were suture ligated.  All granulation tissue was also oozing diffusely.  Ultimately we obtained excellent hemostasis.  Additional cultures were obtained and sent.   The patient was transfused 2 more units  of packed red blood cells in the operating room.  We reapplied a new wound VAC using a bridging technique setting that at 75 mmHg pressure.      DESCRIPTION OF TECHNIQUE:  The patient was brought to the operating room and placed on the table in a supine position after which general endotracheal anesthesia was achieved without incident.  The existing dressing and wound VAC was taken down and I held compression over this area with a sterile glove while the left leg was prepped and draped in the usual sterile fashion.  We padded the distal thigh with cast padding and placed a sterile pneumatic tourniquet, although the tourniquet was not inflated.  We began by removing a few of the sutures from the medial aspect of the end of the amputation stump.  Following this, we found an arterial source of bleeding pumping away as noted above.  It seemed consistent with a branch off of the posterior tibial artery.  The tissue was too friable to dissect it back more proximally and this was simply controlled with a suture ligature.  There was continued venous oozing coming from multiple sites and these areas were also controlled with silk stick ties.  Following this, the wound was irrigated with Pulsavac and 1 liter of double antibiotic solution.  We utilized pinpoint electrocautery and ultimately had excellent hemostasis.  A silver sponge was cut to appropriate dimensions and secured to the skin with the sterile adhesive sheets.  The wound is fairly narrow and I chose to utilize a bridging technique.  The sponge was then connected to the suction apparatus at 75 mmHg continuous pressure.  The final sponge and needle count were reported as correct.  The patient tolerated the procedure without incident.  He was returned extubated and hemodynamically stable to the recovery room.         HEIDI RAMOS MD             D: 12/04/2017 20:03   T: 12/04/2017 21:54   MT: [id number]#[id number]      Name:     GALILEA LUGO   MRN:      [id number]         Account:        [id number]   :      1945           Procedure Date: 2017      Document: [id number]       cc: Leandro Arreola MD

## 2017-12-05 NOTE — PLAN OF CARE
Problem: Skin and Soft Tissue Infection (Adult)  Goal: Signs and Symptoms of Listed Potential Problems Will be Absent, Minimized or Managed (Skin and Soft Tissue Infection)  Signs and symptoms of listed potential problems will be absent, minimized or managed by discharge/transition of care (reference Skin and Soft Tissue Infection (Adult) CPG).    Outcome: No Change  Patient a/o anxious about more surgury.wound vac working iv saline lockedno pain large bm so refused enema.

## 2017-12-05 NOTE — PLAN OF CARE
Problem: Patient Care Overview  Goal: Plan of Care/Patient Progress Review  Outcome: No Change  VSS. Tele afib with CVR (HR 50-60's). Disoriented to situation. Denies pain. Slept between cares. Woundvac intact to left BKA stump, redness surrounding woundvac. No bleeding overnight. C/o neuropathy in RLE, baseline. Voiding per urinal. Refused repositioning when offered. Heparin drip infusing. Next heparin 10a level at 0800.

## 2017-12-05 NOTE — PROGRESS NOTES
"[facility]    Hospitalist Progress Note    Assessment & Plan   Antonio Ramirez is a 72 year old male who was admitted on 11/29/2017 for left BKA abscess and found to have MSSA and has wound vac on BKA:    Impression:   Principal Problem:    Abscess of Left BKA -- S/P I&D 12/1/17, MSSA  Active Problems:    Alcoholic cirrhosis of liver (H)    Chronic kidney disease, stage III (moderate)    CAD, MI in 2007 -- S/P CABG x 3 in 2007    Prostate cancer -- S/P Radiative Seed implants in 2000 (no problems since)    Antiphospholipid Jina Syn, Hypercoag (DVT, PE) -- has IVC filt and Warfarin    Thoracic aortic aneurysm without rupture (H)    Chronic congestive heart failure, unspecified congestive heart failure type (H)    Paroxysmal a-fib (H)    Cellulitis and abscess of leg    Thrombocytopenia -- suspect related to alcohol use    Abscess    Infection of left below knee amputation (H)      Plan:  Back to OR tomorrow for removal of wound vac, continue Ancef 2 gms IV tid, and will increase Neurontin back to his dose at home prior to admission now that renal function improved (reporting increased phantom pain in left \"big toe\").  Increase bowel meds, enema ordered.      DVT Prophylaxis: Currently on IV heparin while Warfarin on hold.    Code Status: Full Code    Disposition: Expected discharge in ?2-3 days once Left BKA infection stable (infection improving and no problems with bleeding).  Might need TCU for wound care depending on wound care needs at discharge.    Davie Osborn MD  Pager [phone]  Cell Phone [phone]  Text Page (7am to 6pm)    Interval History   Overall feels OK but reports 10 out of 10 \"left big toe pain\" which is his baseline phantom pain -- he relates the increased pain to his decreased Neurontin dose.  No BM since admission.      Physical Exam   Temp: 97.3  F (36.3  C) Temp src: Oral BP: 106/54 Pulse: 51 Heart Rate: 55 Resp: 18 SpO2: 96 % O2 Device: None (Room air) Oxygen " Delivery: 2 LPM  Vitals:    11/29/17 1629   Weight: 99.8 kg (220 lb)     Vital Signs with Ranges  Temp:  [97.3  F (36.3  C)-97.7  F (36.5  C)] 97.3  F (36.3  C)  Pulse:  [] 51  Heart Rate:  [55] 55  Resp:  [16-18] 18  BP: ()/(53-84) 106/54  SpO2:  [95 %-99 %] 96 %  I/O last 3 completed shifts:  In: 4108.1 [P.O.:300; I.V.:3208.1]  Out: 1125 [Urine:975; Blood:150]    Constitutional: Awake, alert, cooperative, no apparent distress  Respiratory: Clear to auscultation bilaterally, no crackles or wheezing  Cardiovascular: Regular rate and rhythm, normal S1 and S2, and no murmur noted  GI: Normal bowel sounds, soft, non-distended, non-tender  Extrem: left BKA stump with wound vac -- but redness and swelling much improved compared to admission.   Neuro: Ox3, decreased light touch to mid shin with right leg and distal BKA on left leg.     Medications     HEParin 1,200 Units/hr (12/05/17 0926)       lactobacillus rhamnosus (GG)  1 capsule Oral Daily     sodium phosphate  1 enema Rectal Once     gabapentin  1,200 mg Oral Q24H     gabapentin (NEURONTIN) tablet 600 mg  600 mg Oral BID     sodium chloride (PF)  3 mL Intracatheter Q8H     ceFAZolin  2 g Intravenous Q8H     senna-docusate  1-2 tablet Oral BID     docusate sodium  100 mg Oral BID     potassium chloride SA  10 mEq Oral Daily     carvedilol (COREG) tablet 3.125 mg  3.125 mg Oral BID w/meals       Data     Recent Labs  Lab 12/05/17  0750 12/04/17  1955 12/04/17  1410  12/04/17  0806 12/04/17  0550  12/03/17  0616  12/01/17  1516 12/01/17  0712  11/30/17  0747   WBC 7.7  --   --   --  6.4 5.6  --  6.5  --   --  8.3  --  7.8   HGB 9.5* 10.2* 10.2*  < > 8.0* 8.0*  < > 10.7*  < >  --  12.3*  --  12.5*   MCV 91  --   --   --  95 95  --  96  --   --  94  --  94   *  --   --   --  156 127*  --  121*  --   --  116*  --  106*   INR  --   --   --   --  1.65*  --   --   --   --  1.65* 1.91*  --  2.56*     --   --   --   --  138  --  133  --   --  133  --   134   POTASSIUM 4.2  --   --   --   --  4.2  --  4.2  --   --  3.7  < > 3.1*   CHLORIDE 106  --   --   --   --  105  --  98  --   --  99  --  99   CO2 23  --   --   --   --  26  --  29  --   --  26  --  26   BUN 15  --   --   --   --  18  --  23  --   --  26  --  25   CR 1.08  --   --   --   --  1.43*  --  1.58*  < >  --  1.43*  --  1.27*   ANIONGAP 8  --   --   --   --  7  --  6  --   --  8  --  9   BENNIE 8.2*  --   --   --   --  7.5*  --  8.5  --   --  8.3*  --  8.3*   *  --   --   --   --  104*  --  90  < >  --  93  --  95   AST  --   --   --   --   --   --   --   --   --   --   --   --  18   < > = values in this interval not displayed.    Imaging:   No results found for this or any previous visit (from the past 24 hour(s)).

## 2017-12-05 NOTE — PROGRESS NOTES
"Antonio Ramirez  2017  POD # 4 s/p I&D of left BKA stump abscess.   POD 1 from exploration of BKA wound/control of bleeding and replacement of wound vac  Admit Date:  2017      Objective: patient was asleep upon entering the room. States no complaints this morning. All he wanted to do was sleep he said and rest. Wanted his L BKA left exactly as it was and really didn't want it elevated.   Blood pressure 104/60, pulse 59, temperature 97.5  F (36.4  C), temperature source Oral, resp. rate 16, height 1.854 m (6' 1\"), weight 99.8 kg (220 lb), SpO2 97 %.    Temperatures:  Current - Temp: 97.5  F (36.4  C); Max - Temp  Av.7  F (36.5  C)  Min: 97.3  F (36.3  C)  Max: 98.2  F (36.8  C)  Pulse range: Pulse  Av.3  Min: 59  Max: 60  Blood pressure range: Systolic (24hrs), Av , Min:84 , Max:119   ; Diastolic (24hrs), Av, Min:46, Max:71    Exam:  CMS: intact  alert, stable, wound ok  Left BKA stump: wound vac in place and working well.   Serous fluid drainage in wound vac recently per vascular, no hematogenous drainage.   Patient communicates clearly with examiner    Labs:  Recent Labs   Lab Test  17   0550  17   0616  17   0712   POTASSIUM  4.2  4.2  3.7     Recent Labs   Lab Test  17   1955  17   1410  17   1014   HGB  10.2*  10.2*  9.5*     Recent Labs   Lab Test  17   0806  17   1516  17   0712   INR  1.65*  1.65*  1.91*     Recent Labs   Lab Test  17   0806  17   0550  17   0616   PLT  156  127*  121*       PLAN:  Patient is back on heparin drip  On Ancef per infectious disease instructions for GPC noted in cultures taken from surgery   17.   Continue wound vac  Ortho will continue to follow as further treatment plans are discussed with vascular.   Continue medical management per medical team instructions.     "

## 2017-12-05 NOTE — PLAN OF CARE
"Problem: Patient Care Overview  Goal: Plan of Care/Patient Progress Review  Discharge Planner PT   Patient plan for discharge: None Stated  Current status: Independent with bed mobility. Verbal and tactile cues for BKA strength and stretch exercises. Handout provided for use outside of therapies as well. Pt motivated, had a good attitude. Reviewed importance of exercises in getting back to prosthetic use.   Barriers to return to prior living situation: Impaired transfers and gait. Not at baseline, fall risk. Weak decreased balance.  Recommendations for discharge: Home with home care.  Rationale for recommendations: Pt to have the R BKA \"reduced\" per pt. Would benefit from on going therapy services to progress functional independence and safety with mobility.        Entered by: Kemi Carney 12/05/2017 4:21 PM           "

## 2017-12-05 NOTE — PROGRESS NOTES
No acute events, patient states occasionally his O2 sat monitor will alarm but currently sats are 98-99% on room air    B/P: 104/60, T: 97.5, P: 59, R: 16  Alert oriented no acute distress  Left leg with moderate edema, mild erythema surrounding vac  Vac c/d/i, scan serous output, no signs of bleeding on 75 mm Hg  Offered to help patient reposition to elevate leg but refused.    WBC   Date Value Ref Range Status   12/04/2017 6.4 4.0 - 11.0 10e9/L Final   ]  Hemoglobin   Date Value Ref Range Status   12/04/2017 10.2 (L) 13.3 - 17.7 g/dL Final   ] stable from 10.2  INR/Prothrombin Time  Creatinine   Date Value Ref Range Status   12/04/2017 1.43 (H) 0.66 - 1.25 mg/dL Final   ]      Intake/Output Summary (Last 24 hours) at 12/05/17 0705  Last data filed at 12/05/17 0422   Gross per 24 hour   Intake           3308.1 ml   Output             1125 ml   Net           2183.1 ml       Assessment:  71 yo male POD#1 s/p Left BKA exploration with control of bleeding and replacement of wound vac    Plan:  Continue vac care, further planning to be discussed with ortho as patient will likely require revision as bone is exposed  Heparin drip for history of anti-phospholipid syndrome.  No further antibiotics as cultures have had no growth.  Follow up AM labs    Keily Mandujano MD  Vascular Surgery Fellow  Pager [phone]    As above.  I spoke earlier today with  - he plans to return to the OR tomorrow for further debridement and changing of the wound VAC.    Jorge Arreola M.D.

## 2017-12-05 NOTE — PROGRESS NOTES
12/05/17 1433   Quick Adds   Type of Visit Initial Occupational Therapy Evaluation   Living Environment   Lives With spouse   Living Arrangements apartment   Home Accessibility grab bars present (bathtub);bed and bath on same level   Number of Stairs to Enter Home 0   Number of Stairs Within Home 0   Transportation Available family or friend will provide   Self-Care   Dominant Hand ambidextrous   Usual Activity Tolerance good   Current Activity Tolerance poor   Regular Exercise no   Equipment Currently Used at Home raised toilet;walker, rolling;grab bar   Functional Level Prior   Ambulation 1-->assistive equipment   Transferring 1-->assistive equipment   Toileting 1-->assistive equipment   Bathing 1-->assistive equipment   Dressing 0-->independent   Eating 0-->independent   Communication 0-->understands/communicates without difficulty   Swallowing 0-->swallows foods/liquids without difficulty   Cognition 1 - attention or memory deficits   Fall history within last six months yes   Number of times patient has fallen within last six months 1   General Information   Onset of Illness/Injury or Date of Surgery - Date 11/29/17   Referring Physician Gomez, Von Cespedes MD   Patient/Family Goals Statement Return home   Additional Occupational Profile Info/Pertinent History of Current Problem Antonio Ramirez is a 72 year old male who was admitted on 11/29/2017 for left BKA abscess and found to have MSSA and has wound vac on BKA:   Precautions/Limitations fall precautions   Cognitive Status Examination   Orientation orientation to person, place and time   Level of Consciousness alert   Able to Follow Commands mild impairment  (follows 1 step commands 75% of the time)   Personal Safety (Cognitive) fully aware of deficits;impulsive   Memory intact   Attention No deficits were identified   Organization/Problem Solving No deficits were identified   Executive Function Impulsive   Pain Assessment   Patient Currently in Pain Yes, see  "Vital Sign flowsheet  (4/10 L LE and L big toe)   Range of Motion (ROM)   ROM Comment B UE WFL   Strength   Strength Comments B UE minimal weakness limiting ambulation with FWW   Mobility   Bed Mobility Comments SBA supine <> sit   Transfer Skills   Transfer Comments required immediate intervention for safe transfer tech   Transfer Skill: Bed to Chair/Chair to Bed   Level of Mobile: Bed to Chair other (see comments)  (did not assess )   Lower Body Dressing   Level of Mobile: Dress Lower Body moderate assist (50% patients effort)   Grooming   Level of Mobile: Grooming moderate assist (50% patients effort)   Instrumental Activities of Daily Living (IADL)   Previous Responsibilities medication management   Activities of Daily Living Analysis   Impairments Contributing to Impaired Activities of Daily Living balance impaired;pain;strength decreased  (low activity tolerance)   General Therapy Interventions   Planned Therapy Interventions ADL retraining;transfer training;strengthening   Clinical Impression   Criteria for Skilled Therapeutic Interventions Met yes, treatment indicated   OT Diagnosis Decreased I with ADL/IADLs   Influenced by the following impairments balance, pain, strength, low activity tolerance   Assessment of Occupational Performance 3-5 Performance Deficits   Identified Performance Deficits ADLs (dressing, bathing, toileting, g/h) all IADLs   Clinical Decision Making (Complexity) Moderate complexity   Therapy Frequency daily   Predicted Duration of Therapy Intervention (days/wks) 1 week   Anticipated Discharge Disposition Transitional Care Facility;Acute Rehabilitation Facility   Risks and Benefits of Treatment have been explained. Yes   Patient, Family & other staff in agreement with plan of care Yes   [facility] AM-PAC TM \"6 Clicks\"   2016, Trustees of [facility], under license to [employer].  All rights reserved.   6 Clicks Short Forms Daily Activity Inpatient Short " "Form   [facility] AM-PAC  \"6 Clicks\" Daily Activity Inpatient Short Form   1. Putting on and taking off regular lower body clothing? 2 - A Lot   2. Bathing (including washing, rinsing, drying)? 2 - A Lot   3. Toileting, which includes using toilet, bedpan or urinal? 2 - A Lot   4. Putting on and taking off regular upper body clothing? 3 - A Little   5. Taking care of personal grooming such as brushing teeth? 2 - A Lot   6. Eating meals? 4 - None   Daily Activity Raw Score (Score out of 24.Lower scores equate to lower levels of function) 15   Total Evaluation Time   Total Evaluation Time (Minutes) 10     "

## 2017-12-05 NOTE — PLAN OF CARE
Problem: Patient Care Overview  Goal: Plan of Care/Patient Progress Review  OT: Eval completed and treatment initiated. Antonio Ramirez is a 72 year old male who was admitted on 11/29/2017 for left BKA abscess and found to have MSSA and has wound vac on BKA. Pt lives in apartment with spouse, walk in shower with grab bars, and RTS. Pt uses FWW at baseline but otherwise was I with ADL/IADLs including med management. Spouse assisted with cooking and housekeeping.   Discharge Planner OT   Patient plan for discharge: Home with assist. Agreeable to rehab  Current status: Pt completed sit <> stand with CGA/min A and FWW. Pt completed 1 grooming task while standing with FWW and min A. Pt completed 1 LE dressing task sitting EOB with SBA.  Barriers to return to prior living situation: pain, balance, low activity tolerance, strength  Recommendations for discharge: ARU  Rationale for recommendations: ARU to increase I and safety with ADL/IADLs before returning home.       Entered by: Davie Sharma 12/05/2017 4:00 PM

## 2017-12-05 NOTE — PLAN OF CARE
Problem: Patient Care Overview  Goal: Plan of Care/Patient Progress Review  Outcome: Improving  VSS - BP soft, HR yelena at times. Tele: Afib CVR. Weaned to RA - patient refusing capnography monitoring after education provided. Finger pulse ox in place instead. Denies pain. Baseline neuropathy. Hep gtt @ 10.5 ml/hr. LLE BKA wound vac erythema around site unchanged, no bleeding, to suction @75. Hgb stable. Voiding via urinal.

## 2017-12-05 NOTE — PROGRESS NOTES
[facility]    Infectious Disease Progress Note    Date of Service (when I saw the patient): 12/05/2017     Assessment & Plan   Antonio Ramirez is a 72 year old male who was admitted on 11/29/2017.       Impression:  1. 72 y.o male admitted with left BKA stump abscess. S/P I and D.   2. CAD, Peripheral neuropathy, alcohol abuse, CKD stage 3.   3. Noted GPC in the cultures. Further identified as MSSA     Recommendations:   On ancef for the MSSA seen in the superficial cultures, OR notes reviewed, no mention of infection,cultures from the OR are so far neg. Talked to Vascular, the area did not look particularly infected in the OR, question for ortho is there ea concern for osteo or deep infection.     Claire Salazar MD    Interval History   Remains afebrile GPC in the cultures further identified as MSSA     Physical Exam   Temp: 97.7  F (36.5  C) Temp src: Oral BP: 103/61 Pulse: 55 Heart Rate: 55 Resp: 16 SpO2: 99 % O2 Device: None (Room air) Oxygen Delivery: 2 LPM  Vitals:    11/29/17 1629   Weight: 99.8 kg (220 lb)     Vital Signs with Ranges  Temp:  [97.3  F (36.3  C)-97.8  F (36.6  C)] 97.7  F (36.5  C)  Pulse:  [55-60] 55  Heart Rate:  [55-62] 55  Resp:  [10-16] 16  BP: ()/(46-66) 103/61  SpO2:  [96 %-99 %] 99 %    Constitutional: Awake, alert,  Lungs: Clear to auscultation bilaterally, no crackles or wheezing  Cardiovascular: Regular rate and rhythm, normal S1 and S2, and no murmur noted  Abdomen: Normal bowel sounds, soft, non-distended, non-tender  Skin: dressing on the stump   Other:    Medications     HEParin 1,200 Units/hr (12/05/17 0926)       sodium chloride (PF)  3 mL Intracatheter Q8H     ceFAZolin  2 g Intravenous Q8H     senna-docusate  1-2 tablet Oral BID     docusate sodium  100 mg Oral BID     potassium chloride SA  10 mEq Oral Daily     gabapentin (NEURONTIN) tablet 600 mg  600 mg Oral BID     carvedilol (COREG) tablet 3.125 mg  3.125 mg Oral BID w/meals     gabapentin  1,200  mg Oral At Bedtime       Data   All microbiology laboratory data reviewed.  Recent Labs   Lab Test  12/05/17   0750  12/04/17   1955  12/04/17   1410   12/04/17   0806  12/04/17   0550   WBC  7.7   --    --    --   6.4  5.6   HGB  9.5*  10.2*  10.2*   < >  8.0*  8.0*   HCT  27.9*   --    --    --   23.0*  23.4*   MCV  91   --    --    --   95  95   PLT  142*   --    --    --   156  127*    < > = values in this interval not displayed.     Recent Labs   Lab Test  12/05/17   0750  12/04/17   0550  12/03/17   0616   CR  1.08  1.43*  1.58*     No lab results found.  Recent Labs   Lab Test  12/04/17   0801  12/01/17   1756  11/29/17   1811  11/29/17   1649   CULT  Culture negative monitoring continues  PENDING  PENDING  Light growth  Staphylococcus aureus  This isolate is presumed to be clindamycin resistant based on detection of inducible   clindamycin resistance. Erythromycin and clindamycin are resistant, therefore, they are   not recommended for use.  *  Culture negative monitoring continues  No growth  No growth

## 2017-12-05 NOTE — PROGRESS NOTES
"Antonio Ramirez  2017  POD # 4 s/p I&D of left BKA abscess  Pod 1 from wound exploration and control of bleeding and wound vac placement by vascular.   Admit Date:  2017      Objective: Patient says he is having pain in his \"left toe\". Upset about his gabapentin being ordered wrong.   Wants something to help him go to the bathroom because he hasn't had a bowel movement yet.   Wants to work with therapy some and get up and out of bed.   Blood pressure 103/61, pulse 55, temperature 97.7  F (36.5  C), temperature source Oral, resp. rate 16, height 1.854 m (6' 1\"), weight 99.8 kg (220 lb), SpO2 99 %.    Temperatures:  Current - Temp: 97.7  F (36.5  C); Max - Temp  Av.5  F (36.4  C)  Min: 97.3  F (36.3  C)  Max: 97.7  F (36.5  C)  Pulse range: Pulse  Av.3  Min: 55  Max: 60  Blood pressure range: Systolic (24hrs), Av , Min:95 , Max:119   ; Diastolic (24hrs), Av, Min:46, Max:61    Exam:  CMS: intact  alert, stable, wound ok  Wound vac in place and functioning properly  Communicates clearly with examiner    Labs:  Recent Labs   Lab Test  17   0750  17   0550  17   0616   POTASSIUM  4.2  4.2  4.2     Recent Labs   Lab Test  17   0750  17   1955  17   1410   HGB  9.5*  10.2*  10.2*     Recent Labs   Lab Test  17   0806  17   1516  17   0712   INR  1.65*  1.65*  1.91*     Recent Labs   Lab Test  17   0750  17   0806  17   0550   PLT  142*  156  127*       PLAN: Patient will have therapy consult placed for gait and transfers  He will have an enema ordered to help with constipation  He will be NPO after midnight because we are planning on taking him back to the OR for removal of wound vac,   Debridement and placement of wound vac tomorrow.   He was also placed on a probiotic   Gabapentin, according to the patient is 1200mg at 9AM, 1200mg at 1pm, and 2400mg at 6pm at night.  Heparin drip needs to be held 6-8 hours before " procedure. Once we know time for procedure will discuss when to hold heparin drip.

## 2017-12-06 ENCOUNTER — ANESTHESIA (OUTPATIENT)
Dept: SURGERY | Facility: CLINIC | Age: 72
DRG: 464 | End: 2017-12-06
Payer: MEDICARE

## 2017-12-06 ENCOUNTER — ANESTHESIA EVENT (OUTPATIENT)
Dept: SURGERY | Facility: CLINIC | Age: 72
DRG: 464 | End: 2017-12-06
Payer: MEDICARE

## 2017-12-06 LAB
BACTERIA SPEC CULT: NO GROWTH
GRAM STN SPEC: NORMAL
LMWH PPP CHRO-ACNC: <0.1 IU/ML
Lab: NORMAL
Lab: NORMAL
PLATELET # BLD AUTO: 151 10E9/L (ref 150–450)
SPECIMEN SOURCE: NORMAL
SPECIMEN SOURCE: NORMAL

## 2017-12-06 PROCEDURE — 25800025 ZZH RX 258: Performed by: ORTHOPAEDIC SURGERY

## 2017-12-06 PROCEDURE — 36000050 ZZH SURGERY LEVEL 2 1ST 30 MIN: Performed by: ORTHOPAEDIC SURGERY

## 2017-12-06 PROCEDURE — 87075 CULTR BACTERIA EXCEPT BLOOD: CPT | Performed by: ORTHOPAEDIC SURGERY

## 2017-12-06 PROCEDURE — 40000169 ZZH STATISTIC PRE-PROCEDURE ASSESSMENT I: Performed by: ORTHOPAEDIC SURGERY

## 2017-12-06 PROCEDURE — 12000007 ZZH R&B INTERMEDIATE

## 2017-12-06 PROCEDURE — 0JDP0ZZ EXTRACTION OF LEFT LOWER LEG SUBCUTANEOUS TISSUE AND FASCIA, OPEN APPROACH: ICD-10-PCS | Performed by: ORTHOPAEDIC SURGERY

## 2017-12-06 PROCEDURE — 85520 HEPARIN ASSAY: CPT | Performed by: SURGERY

## 2017-12-06 PROCEDURE — 99232 SBSQ HOSP IP/OBS MODERATE 35: CPT | Performed by: INTERNAL MEDICINE

## 2017-12-06 PROCEDURE — 37000009 ZZH ANESTHESIA TECHNICAL FEE, EACH ADDTL 15 MIN: Performed by: ORTHOPAEDIC SURGERY

## 2017-12-06 PROCEDURE — E2402 NEG PRESS WOUND THERAPY PUMP: HCPCS

## 2017-12-06 PROCEDURE — 27210794 ZZH OR GENERAL SUPPLY STERILE: Performed by: ORTHOPAEDIC SURGERY

## 2017-12-06 PROCEDURE — 36415 COLL VENOUS BLD VENIPUNCTURE: CPT | Performed by: ORTHOPAEDIC SURGERY

## 2017-12-06 PROCEDURE — 25000128 H RX IP 250 OP 636: Performed by: INTERNAL MEDICINE

## 2017-12-06 PROCEDURE — 25000128 H RX IP 250 OP 636: Performed by: ANESTHESIOLOGY

## 2017-12-06 PROCEDURE — 87070 CULTURE OTHR SPECIMN AEROBIC: CPT | Performed by: ORTHOPAEDIC SURGERY

## 2017-12-06 PROCEDURE — A9270 NON-COVERED ITEM OR SERVICE: HCPCS | Mod: GY | Performed by: INTERNAL MEDICINE

## 2017-12-06 PROCEDURE — A9270 NON-COVERED ITEM OR SERVICE: HCPCS | Mod: GY | Performed by: ORTHOPAEDIC SURGERY

## 2017-12-06 PROCEDURE — 87205 SMEAR GRAM STAIN: CPT | Performed by: ORTHOPAEDIC SURGERY

## 2017-12-06 PROCEDURE — 85049 AUTOMATED PLATELET COUNT: CPT | Performed by: ORTHOPAEDIC SURGERY

## 2017-12-06 PROCEDURE — 25000132 ZZH RX MED GY IP 250 OP 250 PS 637: Mod: GY | Performed by: INTERNAL MEDICINE

## 2017-12-06 PROCEDURE — 71000012 ZZH RECOVERY PHASE 1 LEVEL 1 FIRST HR: Performed by: ORTHOPAEDIC SURGERY

## 2017-12-06 PROCEDURE — 36415 COLL VENOUS BLD VENIPUNCTURE: CPT | Performed by: SURGERY

## 2017-12-06 PROCEDURE — 37000008 ZZH ANESTHESIA TECHNICAL FEE, 1ST 30 MIN: Performed by: ORTHOPAEDIC SURGERY

## 2017-12-06 PROCEDURE — 25000128 H RX IP 250 OP 636: Performed by: NURSE ANESTHETIST, CERTIFIED REGISTERED

## 2017-12-06 PROCEDURE — 25000125 ZZHC RX 250: Performed by: NURSE ANESTHETIST, CERTIFIED REGISTERED

## 2017-12-06 PROCEDURE — 36000052 ZZH SURGERY LEVEL 2 EA 15 ADDTL MIN: Performed by: ORTHOPAEDIC SURGERY

## 2017-12-06 PROCEDURE — 25000566 ZZH SEVOFLURANE, EA 15 MIN: Performed by: ORTHOPAEDIC SURGERY

## 2017-12-06 PROCEDURE — 25000132 ZZH RX MED GY IP 250 OP 250 PS 637: Mod: GY | Performed by: ORTHOPAEDIC SURGERY

## 2017-12-06 PROCEDURE — 27210995 ZZH RX 272: Performed by: ORTHOPAEDIC SURGERY

## 2017-12-06 PROCEDURE — 25000128 H RX IP 250 OP 636: Performed by: ORTHOPAEDIC SURGERY

## 2017-12-06 RX ORDER — PROPOFOL 10 MG/ML
INJECTION, EMULSION INTRAVENOUS PRN
Status: DISCONTINUED | OUTPATIENT
Start: 2017-12-06 | End: 2017-12-06

## 2017-12-06 RX ORDER — AMOXICILLIN 250 MG
1-2 CAPSULE ORAL 2 TIMES DAILY
Status: DISCONTINUED | OUTPATIENT
Start: 2017-12-06 | End: 2017-12-07

## 2017-12-06 RX ORDER — FENTANYL CITRATE 50 UG/ML
25-50 INJECTION, SOLUTION INTRAMUSCULAR; INTRAVENOUS EVERY 5 MIN PRN
Status: DISCONTINUED | OUTPATIENT
Start: 2017-12-06 | End: 2017-12-06 | Stop reason: HOSPADM

## 2017-12-06 RX ORDER — MAGNESIUM HYDROXIDE 1200 MG/15ML
LIQUID ORAL PRN
Status: DISCONTINUED | OUTPATIENT
Start: 2017-12-06 | End: 2017-12-06 | Stop reason: HOSPADM

## 2017-12-06 RX ORDER — ACETAMINOPHEN 325 MG/1
650 TABLET ORAL EVERY 4 HOURS PRN
Status: DISCONTINUED | OUTPATIENT
Start: 2017-12-09 | End: 2017-12-08

## 2017-12-06 RX ORDER — NALOXONE HYDROCHLORIDE 0.4 MG/ML
.1-.4 INJECTION, SOLUTION INTRAMUSCULAR; INTRAVENOUS; SUBCUTANEOUS
Status: DISCONTINUED | OUTPATIENT
Start: 2017-12-06 | End: 2017-12-06

## 2017-12-06 RX ORDER — SODIUM CHLORIDE, SODIUM LACTATE, POTASSIUM CHLORIDE, CALCIUM CHLORIDE 600; 310; 30; 20 MG/100ML; MG/100ML; MG/100ML; MG/100ML
INJECTION, SOLUTION INTRAVENOUS CONTINUOUS
Status: DISCONTINUED | OUTPATIENT
Start: 2017-12-06 | End: 2017-12-06 | Stop reason: HOSPADM

## 2017-12-06 RX ORDER — HYDROXYZINE HYDROCHLORIDE 10 MG/1
10 TABLET, FILM COATED ORAL EVERY 6 HOURS PRN
Status: DISCONTINUED | OUTPATIENT
Start: 2017-12-06 | End: 2017-12-10 | Stop reason: HOSPADM

## 2017-12-06 RX ORDER — FENTANYL CITRATE 50 UG/ML
INJECTION, SOLUTION INTRAMUSCULAR; INTRAVENOUS PRN
Status: DISCONTINUED | OUTPATIENT
Start: 2017-12-06 | End: 2017-12-06

## 2017-12-06 RX ORDER — ONDANSETRON 2 MG/ML
4 INJECTION INTRAMUSCULAR; INTRAVENOUS EVERY 30 MIN PRN
Status: DISCONTINUED | OUTPATIENT
Start: 2017-12-06 | End: 2017-12-06 | Stop reason: HOSPADM

## 2017-12-06 RX ORDER — HEPARIN SODIUM 5000 [USP'U]/.5ML
5000 INJECTION, SOLUTION INTRAVENOUS; SUBCUTANEOUS EVERY 8 HOURS
Status: DISCONTINUED | OUTPATIENT
Start: 2017-12-07 | End: 2017-12-07

## 2017-12-06 RX ORDER — LABETALOL HYDROCHLORIDE 5 MG/ML
10 INJECTION, SOLUTION INTRAVENOUS
Status: DISCONTINUED | OUTPATIENT
Start: 2017-12-06 | End: 2017-12-06 | Stop reason: HOSPADM

## 2017-12-06 RX ORDER — ONDANSETRON 4 MG/1
4 TABLET, ORALLY DISINTEGRATING ORAL EVERY 30 MIN PRN
Status: DISCONTINUED | OUTPATIENT
Start: 2017-12-06 | End: 2017-12-06 | Stop reason: HOSPADM

## 2017-12-06 RX ORDER — SODIUM CHLORIDE, SODIUM LACTATE, POTASSIUM CHLORIDE, CALCIUM CHLORIDE 600; 310; 30; 20 MG/100ML; MG/100ML; MG/100ML; MG/100ML
INJECTION, SOLUTION INTRAVENOUS CONTINUOUS
Status: DISCONTINUED | OUTPATIENT
Start: 2017-12-06 | End: 2017-12-08

## 2017-12-06 RX ORDER — LIDOCAINE 40 MG/G
CREAM TOPICAL
Status: DISCONTINUED | OUTPATIENT
Start: 2017-12-06 | End: 2017-12-08

## 2017-12-06 RX ORDER — ONDANSETRON 2 MG/ML
4 INJECTION INTRAMUSCULAR; INTRAVENOUS EVERY 6 HOURS PRN
Status: DISCONTINUED | OUTPATIENT
Start: 2017-12-06 | End: 2017-12-10 | Stop reason: HOSPADM

## 2017-12-06 RX ORDER — CEFAZOLIN SODIUM 2 G/100ML
2 INJECTION, SOLUTION INTRAVENOUS
Status: CANCELLED | OUTPATIENT
Start: 2017-12-06

## 2017-12-06 RX ORDER — ONDANSETRON 4 MG/1
4 TABLET, ORALLY DISINTEGRATING ORAL EVERY 6 HOURS PRN
Status: DISCONTINUED | OUTPATIENT
Start: 2017-12-06 | End: 2017-12-10 | Stop reason: HOSPADM

## 2017-12-06 RX ORDER — HYDROMORPHONE HYDROCHLORIDE 1 MG/ML
.3-.5 INJECTION, SOLUTION INTRAMUSCULAR; INTRAVENOUS; SUBCUTANEOUS
Status: DISCONTINUED | OUTPATIENT
Start: 2017-12-06 | End: 2017-12-08

## 2017-12-06 RX ORDER — LIDOCAINE HYDROCHLORIDE 20 MG/ML
INJECTION, SOLUTION INFILTRATION; PERINEURAL PRN
Status: DISCONTINUED | OUTPATIENT
Start: 2017-12-06 | End: 2017-12-06

## 2017-12-06 RX ORDER — OXYCODONE HYDROCHLORIDE 5 MG/1
5-10 TABLET ORAL EVERY 4 HOURS PRN
Status: DISCONTINUED | OUTPATIENT
Start: 2017-12-06 | End: 2017-12-08

## 2017-12-06 RX ORDER — ACETAMINOPHEN 325 MG/1
975 TABLET ORAL EVERY 8 HOURS
Status: DISCONTINUED | OUTPATIENT
Start: 2017-12-06 | End: 2017-12-08

## 2017-12-06 RX ORDER — EPHEDRINE SULFATE 50 MG/ML
INJECTION, SOLUTION INTRAMUSCULAR; INTRAVENOUS; SUBCUTANEOUS PRN
Status: DISCONTINUED | OUTPATIENT
Start: 2017-12-06 | End: 2017-12-06

## 2017-12-06 RX ORDER — HYDROMORPHONE HYDROCHLORIDE 1 MG/ML
.3-.5 INJECTION, SOLUTION INTRAMUSCULAR; INTRAVENOUS; SUBCUTANEOUS EVERY 5 MIN PRN
Status: DISCONTINUED | OUTPATIENT
Start: 2017-12-06 | End: 2017-12-06 | Stop reason: HOSPADM

## 2017-12-06 RX ORDER — NALOXONE HYDROCHLORIDE 0.4 MG/ML
.1-.4 INJECTION, SOLUTION INTRAMUSCULAR; INTRAVENOUS; SUBCUTANEOUS
Status: DISCONTINUED | OUTPATIENT
Start: 2017-12-06 | End: 2017-12-08

## 2017-12-06 RX ADMIN — FENTANYL CITRATE 25 MCG: 50 INJECTION, SOLUTION INTRAMUSCULAR; INTRAVENOUS at 11:07

## 2017-12-06 RX ADMIN — FENTANYL CITRATE 25 MCG: 50 INJECTION, SOLUTION INTRAMUSCULAR; INTRAVENOUS at 11:36

## 2017-12-06 RX ADMIN — Medication 5 MG: at 11:36

## 2017-12-06 RX ADMIN — GABAPENTIN 2400 MG: 600 TABLET, FILM COATED ORAL at 17:17

## 2017-12-06 RX ADMIN — CEFAZOLIN SODIUM 2 G: 2 INJECTION, SOLUTION INTRAVENOUS at 04:26

## 2017-12-06 RX ADMIN — GABAPENTIN 1200 MG: 300 CAPSULE ORAL at 08:33

## 2017-12-06 RX ADMIN — FENTANYL CITRATE 50 MCG: 50 INJECTION INTRAMUSCULAR; INTRAVENOUS at 12:49

## 2017-12-06 RX ADMIN — PROPOFOL 150 MG: 10 INJECTION, EMULSION INTRAVENOUS at 11:07

## 2017-12-06 RX ADMIN — Medication 5 MG: at 11:22

## 2017-12-06 RX ADMIN — CEFAZOLIN SODIUM 2 G: 2 INJECTION, SOLUTION INTRAVENOUS at 19:49

## 2017-12-06 RX ADMIN — HYDROMORPHONE HYDROCHLORIDE 0.5 MG: 1 INJECTION, SOLUTION INTRAMUSCULAR; INTRAVENOUS; SUBCUTANEOUS at 12:33

## 2017-12-06 RX ADMIN — CEFAZOLIN SODIUM 2 G: 2 INJECTION, SOLUTION INTRAVENOUS at 11:12

## 2017-12-06 RX ADMIN — SODIUM CHLORIDE, POTASSIUM CHLORIDE, SODIUM LACTATE AND CALCIUM CHLORIDE: 600; 310; 30; 20 INJECTION, SOLUTION INTRAVENOUS at 15:49

## 2017-12-06 RX ADMIN — Medication 1 MG: at 22:33

## 2017-12-06 RX ADMIN — SODIUM CHLORIDE, POTASSIUM CHLORIDE, SODIUM LACTATE AND CALCIUM CHLORIDE: 600; 310; 30; 20 INJECTION, SOLUTION INTRAVENOUS at 14:41

## 2017-12-06 RX ADMIN — POTASSIUM CHLORIDE 10 MEQ: 750 TABLET, EXTENDED RELEASE ORAL at 08:34

## 2017-12-06 RX ADMIN — Medication 1 CAPSULE: at 08:34

## 2017-12-06 RX ADMIN — MIDAZOLAM HYDROCHLORIDE 2 MG: 1 INJECTION, SOLUTION INTRAMUSCULAR; INTRAVENOUS at 11:00

## 2017-12-06 RX ADMIN — SODIUM CHLORIDE, POTASSIUM CHLORIDE, SODIUM LACTATE AND CALCIUM CHLORIDE: 600; 310; 30; 20 INJECTION, SOLUTION INTRAVENOUS at 11:00

## 2017-12-06 RX ADMIN — LIDOCAINE HYDROCHLORIDE 100 MG: 20 INJECTION, SOLUTION INFILTRATION; PERINEURAL at 11:07

## 2017-12-06 RX ADMIN — FENTANYL CITRATE 50 MCG: 50 INJECTION INTRAMUSCULAR; INTRAVENOUS at 12:58

## 2017-12-06 NOTE — ANESTHESIA CARE TRANSFER NOTE
Patient: Antonio Ramirez    Procedure(s):  IRRIGATION AND DEBRIDEMENT OF LEFT BELOW KNEE AMPUTATION SITE WITH WOUND VAC REPLACEMENT - Wound Class: II-Clean Contaminated   - Wound Class: II-Clean Contaminated    Diagnosis: SEE INPATIENT CHART  Diagnosis Additional Information: No value filed.    Anesthesia Type:   General, LMA     Note:  Airway :Face Mask  Patient transferred to:PACU  Comments: At end of procedure, spontaneous respirations, adequate tidal volumes, followed commands to voice, LMA removed atraumatically, airway patent after LMA removal. Oxygen via facemask at 6 liters per minute to PACU. Oxygen tubing connected to wall O2 in PACU, SpO2, NiBP, and EKG monitors and alarms on and functioning, Ez Hugger warmer connected to patient gown, report on patient's clinical status given to PACU RN, RN questions answered.Handoff Report: Identifed the Patient, Identified the Reponsible Provider, Reviewed the pertinent medical history, Discussed the surgical course, Reviewed Intra-OP anesthesia mangement and issues during anesthesia, Set expectations for post-procedure period and Allowed opportunity for questions and acknowledgement of understanding      Vitals: (Last set prior to Anesthesia Care Transfer)    CRNA VITALS  12/6/2017 1133 - 12/6/2017 1209      12/6/2017             Resp Rate (set): 10                Electronically Signed By: DIPAK Soliz CRNA  December 6, 2017  12:09 PM

## 2017-12-06 NOTE — PLAN OF CARE
Problem: Patient Care Overview  Goal: Plan of Care/Patient Progress Review  PT: Pt gone to OR for L BKA I&D.

## 2017-12-06 NOTE — PLAN OF CARE
Problem: Patient Care Overview  Goal: Plan of Care/Patient Progress Review  Outcome: Improving  Patient A&O, VSS. Heparin drip stopped at 0400 per order for procedure. Wound vac to left leg, erythema present surrounding. Denied pain, slept all night. Tolerating regular diet. Voiding in urinal. Numbness and tingling present to RLE.

## 2017-12-06 NOTE — PROGRESS NOTES
HOSPITALIST SERVICE CROSS-COVER NOTE:    Ordered extra 600 mg of Neurontin for tonight.      Bob Mullen MD  Hospitalist  Pager # [phone]

## 2017-12-06 NOTE — PROGRESS NOTES
Chart reviewed, attempted to see pt for LOS but he was unavailable all day.  Will f/u tomorrow for full assessment.    Ya Denny RD  Pager [phone] (M-F)            [phone] (W/[facility] Hol)

## 2017-12-06 NOTE — ANESTHESIA POSTPROCEDURE EVALUATION
Patient: Antonio Ramirez    Procedure(s):  IRRIGATION AND DEBRIDEMENT OF LEFT BELOW KNEE AMPUTATION SITE WITH WOUND VAC REPLACEMENT - Wound Class: II-Clean Contaminated   - Wound Class: II-Clean Contaminated    Diagnosis:SEE INPATIENT CHART  Diagnosis Additional Information: No value filed.    Anesthesia Type:  General, LMA    Note:  Anesthesia Post Evaluation    Patient location during evaluation: PACU  Patient participation: Able to fully participate in evaluation  Level of consciousness: awake  Pain management: adequate  Airway patency: patent  Cardiovascular status: acceptable  Respiratory status: acceptable  Hydration status: acceptable  PONV: none     Anesthetic complications: None          Last vitals:  Vitals:    12/06/17 1245 12/06/17 1300 12/06/17 1305   BP: 121/71 118/65 118/65   Pulse:      Resp: 10 9 10   Temp: 35.9  C (96.6  F) 35.9  C (96.6  F) 35.8  C (96.4  F)   SpO2: 98% 92% 100%         Electronically Signed By: WILMA ELIZABETH MD  December 6, 2017  2:00 PM

## 2017-12-06 NOTE — ANESTHESIA PREPROCEDURE EVALUATION
Anesthesia Evaluation     . Pt has had prior anesthetic.     History of anesthetic complications (crazy after stump revision, likely pain meds)          ROS/MED HX    ENT/Pulmonary: Comment: Hx of bilateral pulm emboli, on anticoagul;atiopnm     (-) sleep apnea   Neurologic: Comment: Balance problem    (+)neuropathy     Cardiovascular: Comment: Le thrombophlebiits    Echo 9/2016: EF 55%, mild ms, mod mr.  Improved ef since previous echo    (+) hypertension-range: well controlled, -CAD, -past MI (MI, s/p triple bypass, no cardiac issues since per patient),-. Taking blood thinners : . CHF Last EF: 45-50 date: 2017 . fainting (syncope). :. valvular problems/murmurs type: AS and MR . Previous cardiac testing Echodate:5/2017results:EF 45-50%, reduced RVSF, elevated pulmonary pressures (per notes, but no description of severity)Stress Testdate:2016 results:Negative for ischemiaECG reviewed date:11/2017 results:Afib date: results:          METS/Exercise Tolerance:     Hematologic: Comments: Anticardiolipin antibody    (+) History of blood clots pt is anticoagulated, -      Musculoskeletal:         GI/Hepatic:     (+) GERD (hx of it, but patient denies symptoms.  Patient denies) hiatal hernia, liver disease (etohc cirrhosis, with portal hypertension.  Per patient has not had ascities in the past year),       Renal/Genitourinary: Comment: Urinary stricture, stones    (+) chronic renal disease (stage 3), type: CRI,       Endo:         Psychiatric:         Infectious Disease:         Malignancy:         Other:                     Physical Exam  Normal systems: cardiovascular, pulmonary and dental    Airway   Mallampati: II  TM distance: >3 FB  Neck ROM: full    Dental   Comment: native    Cardiovascular       Pulmonary                         Anesthesia Plan      History & Physical Review  History and physical reviewed and following examination; no interval change.    ASA Status:  3 emergent.    NPO Status:  > 8  hours    Plan for General and LMA Maintenance will be Balanced.    PONV prophylaxis:  Ondansetron (or other 5HT-3) and Dexamethasone or Solumedrol       Postoperative Care      Consents  Anesthetic plan, risks, benefits and alternatives discussed with:  Patient..                          .

## 2017-12-06 NOTE — PLAN OF CARE
Problem: Patient Care Overview  Goal: Plan of Care/Patient Progress Review  Outcome: No Change  A/O x4. VSS on RA. Heparin infusing @ 12 ml/hr. Erythema surrounding wound vac. BS active and audible. Pain managed with dilaudid. Tolerating regular diet. Voiding in urinal.

## 2017-12-06 NOTE — BRIEF OP NOTE
[facility] Brief Operative Note    Pre-operative diagnosis: SEE INPATIENT CHART   Post-operative diagnosis History of abscess left below the knee amputation site   Procedure: Procedure(s):  IRRIGATION AND DEBRIDEMENT OF LEFT BELOW KNEE AMPUTATION SITE WITH WOUND VAC REPLACEMENT - Wound Class: II-Clean Contaminated   - Wound Class: II-Clean Contaminated   Surgeon(s): Surgeon(s) and Role:     * Saima Howard MD - Primary     * Iftikhar Santamaria PA-C - Assisting   Estimated blood loss: * No values recorded between 12/6/2017 11:27 AM and 12/6/2017 12:06 PM *    Specimens:   ID Type Source Tests Collected by Time Destination   1 : LEFT BELOW KNEE AMPUTATION WOUND Wound Leg Lower, Left ANAEROBIC BACTERIAL CULTURE, GRAM STAIN, WOUND CULTURE AEROBIC BACTERIAL Saima Howard MD 12/6/2017 11:59 AM       Findings: Granulation tissue improving. No indications of infection.   Wound vac placed.

## 2017-12-06 NOTE — PROGRESS NOTES
[facility]    Hospitalist Progress Note    Assessment & Plan   Antonio Ramirez is a 72 year old male who was admitted on 11/29/2017 for left BKA abscess and found to have MSSA and has wound vac on BKA:    Impression:   Principal Problem:    Abscess of Left BKA -- S/P I&D 12/1/17, MSSA  Active Problems:    Alcoholic cirrhosis of liver (H)    Chronic kidney disease, stage III (moderate)    CAD, MI in 2007 -- S/P CABG x 3 in 2007    Prostate cancer -- S/P Radiative Seed implants in 2000 (no problems since)    Antiphospholipid Jina Syn, Hypercoag (DVT, PE) -- has IVC filt and Warfarin    Thoracic aortic aneurysm without rupture (H)    Chronic congestive heart failure, unspecified congestive heart failure type (H)    Paroxysmal a-fib (H)    Cellulitis and abscess of leg    Thrombocytopenia -- suspect related to alcohol use, improving    Abscess    Infection of left below knee amputation (H)      Plan:  Will resume anticoagulation.  Continue antibiotics.      DVT Prophylaxis: Currently on IV heparin while Warfarin on hold.    Code Status: Full Code    Disposition: Expected discharge in ?2 days once Left BKA infection stable (infection improving and no problems with bleeding).  Might need TCU for wound care depending on wound care needs at discharge.    Davie Osborn MD  Pager [phone]  Cell Phone [phone]  Text Page (7am to 6pm)    Interval History   Overall feels better today.  Wound was clean when Wound vac taken off, no further bleeding reported.    Physical Exam   Temp: 97  F (36.1  C) Temp src: Oral BP: 116/70 Pulse: (!) 44 Heart Rate: 57 Resp: 14 SpO2: 100 % O2 Device: Nasal cannula Oxygen Delivery: 2 LPM  Vitals:    11/29/17 1629 12/06/17 0600   Weight: 99.8 kg (220 lb) 105.4 kg (232 lb 5.8 oz)     Vital Signs with Ranges  Temp:  [96.4  F (35.8  C)-98.4  F (36.9  C)] 97  F (36.1  C)  Pulse:  [44-54] 44  Heart Rate:  [49-58] 57  Resp:  [8-18] 14  BP: ()/(54-78) 116/70  SpO2:   [92 %-100 %] 100 %  I/O last 3 completed shifts:  In: 1018 [P.O.:240; I.V.:778]  Out: 790 [Urine:790]    Constitutional: Awake, alert, cooperative, no apparent distress  Respiratory: Clear to auscultation bilaterally, no crackles or wheezing  Cardiovascular: Regular rate and rhythm, normal S1 and S2, and 2/6 systolic murmur left sternal border  GI: Normal bowel sounds, soft, non-distended, non-tender  Extrem: left BKA stump with wound vac -- but redness and swelling much improved compared to admission.   Neuro: Ox3, decreased light touch to mid shin with right leg and distal BKA on left leg.     Medications     lactated ringers 75 mL/hr at 12/06/17 1549       sodium chloride (PF)  3 mL Intracatheter Q8H     [START ON 12/7/2017] heparin  5,000 Units Subcutaneous Q8H     acetaminophen  975 mg Oral Q8H     senna-docusate  1-2 tablet Oral BID     lactobacillus rhamnosus (GG)  1 capsule Oral Daily     sodium phosphate  1 enema Rectal Once     docusate sodium  200 mg Oral BID     gabapentin  1,200 mg Oral BID     gabapentin (NEURONTIN) tablet 2,400 mg  2,400 mg Oral Q24H     sodium chloride (PF)  3 mL Intracatheter Q8H     ceFAZolin  2 g Intravenous Q8H     potassium chloride SA  10 mEq Oral Daily     carvedilol (COREG) tablet 3.125 mg  3.125 mg Oral BID w/meals       Data     Recent Labs  Lab 12/06/17  1455 12/05/17  0750 12/04/17  1955 12/04/17  1410  12/04/17  0806 12/04/17  0550  12/03/17  0616  12/01/17  1516 12/01/17  0712  11/30/17  0747   WBC  --  7.7  --   --   --  6.4 5.6  --  6.5  --   --  8.3  --  7.8   HGB  --  9.5* 10.2* 10.2*  < > 8.0* 8.0*  < > 10.7*  < >  --  12.3*  --  12.5*   MCV  --  91  --   --   --  95 95  --  96  --   --  94  --  94    142*  --   --   --  156 127*  --  121*  --   --  116*  --  106*   INR  --   --   --   --   --  1.65*  --   --   --   --  1.65* 1.91*  --  2.56*   NA  --  137  --   --   --   --  138  --  133  --   --  133  --  134   POTASSIUM  --  4.2  --   --   --   --  4.2   --  4.2  --   --  3.7  < > 3.1*   CHLORIDE  --  106  --   --   --   --  105  --  98  --   --  99  --  99   CO2  --  23  --   --   --   --  26  --  29  --   --  26  --  26   BUN  --  15  --   --   --   --  18  --  23  --   --  26  --  25   CR  --  1.08  --   --   --   --  1.43*  --  1.58*  < >  --  1.43*  --  1.27*   ANIONGAP  --  8  --   --   --   --  7  --  6  --   --  8  --  9   BENNIE  --  8.2*  --   --   --   --  7.5*  --  8.5  --   --  8.3*  --  8.3*   GLC  --  121*  --   --   --   --  104*  --  90  < >  --  93  --  95   AST  --   --   --   --   --   --   --   --   --   --   --   --   --  18   < > = values in this interval not displayed.    Imaging:   No results found for this or any previous visit (from the past 24 hour(s)).

## 2017-12-06 NOTE — PLAN OF CARE
Problem: Patient Care Overview  Goal: Plan of Care/Patient Progress Review  OT: pt gone for surgery L AIYANA I&D

## 2017-12-06 NOTE — PLAN OF CARE
"Problem: Skin and Soft Tissue Infection (Adult)  Goal: Signs and Symptoms of Listed Potential Problems Will be Absent, Minimized or Managed (Skin and Soft Tissue Infection)  Signs and symptoms of listed potential problems will be absent, minimized or managed by discharge/transition of care (reference Skin and Soft Tissue Infection (Adult) CPG).    Outcome: Improving  RN: pt alert, oriented, pleasant.  Tele afib, often yelena--beta blocker not given preop due to parameters.  Returned from I&D/wound vac change, denies pain.  Tolerating clear liquids, interested in advancing diet (advanced).      /70  Pulse (!) 44  Temp 97  F (36.1  C) (Oral)  Resp 14  Ht 1.854 m (6' 1\")  Wt 105.4 kg (232 lb 5.8 oz)  SpO2 100%  BMI 30.66 kg/m2        "

## 2017-12-07 LAB
HGB BLD-MCNC: 8.6 G/DL (ref 13.3–17.7)
PLATELET # BLD AUTO: 164 10E9/L (ref 150–450)

## 2017-12-07 PROCEDURE — 25000132 ZZH RX MED GY IP 250 OP 250 PS 637: Mod: GY | Performed by: INTERNAL MEDICINE

## 2017-12-07 PROCEDURE — 25000128 H RX IP 250 OP 636: Performed by: INTERNAL MEDICINE

## 2017-12-07 PROCEDURE — A9270 NON-COVERED ITEM OR SERVICE: HCPCS | Mod: GY | Performed by: ORTHOPAEDIC SURGERY

## 2017-12-07 PROCEDURE — 25000132 ZZH RX MED GY IP 250 OP 250 PS 637: Mod: GY | Performed by: PHYSICIAN ASSISTANT

## 2017-12-07 PROCEDURE — 85049 AUTOMATED PLATELET COUNT: CPT | Performed by: INTERNAL MEDICINE

## 2017-12-07 PROCEDURE — 25000128 H RX IP 250 OP 636: Performed by: ORTHOPAEDIC SURGERY

## 2017-12-07 PROCEDURE — 36415 COLL VENOUS BLD VENIPUNCTURE: CPT | Performed by: INTERNAL MEDICINE

## 2017-12-07 PROCEDURE — 25000132 ZZH RX MED GY IP 250 OP 250 PS 637: Mod: GY | Performed by: ORTHOPAEDIC SURGERY

## 2017-12-07 PROCEDURE — 12000007 ZZH R&B INTERMEDIATE

## 2017-12-07 PROCEDURE — 85610 PROTHROMBIN TIME: CPT | Performed by: ORTHOPAEDIC SURGERY

## 2017-12-07 PROCEDURE — 85018 HEMOGLOBIN: CPT | Performed by: INTERNAL MEDICINE

## 2017-12-07 PROCEDURE — 99232 SBSQ HOSP IP/OBS MODERATE 35: CPT | Performed by: INTERNAL MEDICINE

## 2017-12-07 PROCEDURE — A9270 NON-COVERED ITEM OR SERVICE: HCPCS | Mod: GY | Performed by: INTERNAL MEDICINE

## 2017-12-07 PROCEDURE — A9270 NON-COVERED ITEM OR SERVICE: HCPCS | Mod: GY | Performed by: PHYSICIAN ASSISTANT

## 2017-12-07 RX ORDER — POLYETHYLENE GLYCOL 3350 17 G/17G
17 POWDER, FOR SOLUTION ORAL 2 TIMES DAILY PRN
Status: DISCONTINUED | OUTPATIENT
Start: 2017-12-07 | End: 2017-12-10 | Stop reason: HOSPADM

## 2017-12-07 RX ORDER — HEPARIN SODIUM 5000 [USP'U]/.5ML
5000 INJECTION, SOLUTION INTRAVENOUS; SUBCUTANEOUS EVERY 8 HOURS
Status: DISCONTINUED | OUTPATIENT
Start: 2017-12-07 | End: 2017-12-07

## 2017-12-07 RX ORDER — AMOXICILLIN 250 MG
2 CAPSULE ORAL 2 TIMES DAILY
Status: DISCONTINUED | OUTPATIENT
Start: 2017-12-07 | End: 2017-12-10 | Stop reason: HOSPADM

## 2017-12-07 RX ADMIN — CEFAZOLIN SODIUM 2 G: 2 INJECTION, SOLUTION INTRAVENOUS at 03:48

## 2017-12-07 RX ADMIN — HEPARIN SODIUM 5000 UNITS: 5000 INJECTION, SOLUTION INTRAVENOUS; SUBCUTANEOUS at 06:46

## 2017-12-07 RX ADMIN — CEFAZOLIN SODIUM 2 G: 2 INJECTION, SOLUTION INTRAVENOUS at 21:17

## 2017-12-07 RX ADMIN — SODIUM CHLORIDE, POTASSIUM CHLORIDE, SODIUM LACTATE AND CALCIUM CHLORIDE: 600; 310; 30; 20 INJECTION, SOLUTION INTRAVENOUS at 19:00

## 2017-12-07 RX ADMIN — OXYCODONE HYDROCHLORIDE 5 MG: 5 TABLET ORAL at 21:36

## 2017-12-07 RX ADMIN — CARVEDILOL 3.12 MG: 3.12 TABLET, FILM COATED ORAL at 09:30

## 2017-12-07 RX ADMIN — POTASSIUM CHLORIDE 10 MEQ: 750 TABLET, EXTENDED RELEASE ORAL at 09:30

## 2017-12-07 RX ADMIN — GABAPENTIN 1200 MG: 300 CAPSULE ORAL at 09:29

## 2017-12-07 RX ADMIN — GABAPENTIN 1200 MG: 300 CAPSULE ORAL at 13:04

## 2017-12-07 RX ADMIN — GABAPENTIN 2400 MG: 600 TABLET, FILM COATED ORAL at 17:59

## 2017-12-07 RX ADMIN — SODIUM CHLORIDE, POTASSIUM CHLORIDE, SODIUM LACTATE AND CALCIUM CHLORIDE: 600; 310; 30; 20 INJECTION, SOLUTION INTRAVENOUS at 05:37

## 2017-12-07 RX ADMIN — CEFAZOLIN SODIUM 2 G: 2 INJECTION, SOLUTION INTRAVENOUS at 13:04

## 2017-12-07 RX ADMIN — Medication 1 CAPSULE: at 09:29

## 2017-12-07 RX ADMIN — CARVEDILOL 3.12 MG: 3.12 TABLET, FILM COATED ORAL at 17:59

## 2017-12-07 RX ADMIN — HEPARIN SODIUM 5000 UNITS: 5000 INJECTION, SOLUTION INTRAVENOUS; SUBCUTANEOUS at 15:41

## 2017-12-07 ASSESSMENT — PAIN DESCRIPTION - DESCRIPTORS: DESCRIPTORS: SHOOTING;SHARP

## 2017-12-07 NOTE — PLAN OF CARE
"Problem: Patient Care Overview  Goal: Plan of Care/Patient Progress Review  OT: Attempted however patient states \"you guys always come at the wrong time\". Discussed better time for therapy with patient. Would like both therapies in the AM.      "

## 2017-12-07 NOTE — PLAN OF CARE
Problem: Patient Care Overview  Goal: Plan of Care/Patient Progress Review  PT: Session attempted. Pt politely requesting to hold therapy today d/t fatigue, stating he prefers therapy in the morning only. Educated pt on rehab scheduling, will try to accommodate pt preferences to increase participation as able.

## 2017-12-07 NOTE — PROGRESS NOTES
"Antonio Ramirez  2017  POD # 1 sp I&D left bka stump  Admit Date:  2017      Doing well.  Clean wound without signs of infection.  Normal healing wound.  No immediate surgical complications identified.  No excessive bleeding  Objective:  Blood pressure 112/65, pulse (!) 49, temperature 98.2  F (36.8  C), temperature source Oral, resp. rate 16, height 1.854 m (6' 1\"), weight 105.4 kg (232 lb 5.8 oz), SpO2 97 %.    Temperatures:  Current - Temp: 98.2  F (36.8  C); Max - Temp  Av.5  F (36.4  C)  Min: 96  F (35.6  C)  Max: 98.2  F (36.8  C)  Pulse range: Pulse  Av.5  Min: 49  Max: 50  Blood pressure range: Systolic (24hrs), Av , Min:108 , Max:122   ; Diastolic (24hrs), Av, Min:65, Max:71    Exam:  CMS: intact  alert, stable, wound ok    Labs:  Recent Labs   Lab Test  17   0750  17   0550  17   0616   POTASSIUM  4.2  4.2  4.2     Recent Labs   Lab Test  17   0813  17   0750  17   1955   HGB  8.6*  9.5*  10.2*     Recent Labs   Lab Test  17   0806  17   1516  17   0712   INR  1.65*  1.65*  1.91*     Recent Labs   Lab Test  17   0813  17   1455  17   0750   PLT  164  151  142*       PLAN: Non weight bearing  Pain control measures  Additional problems to be followed by medicine physician  Or on Friday for closure of wound and tibial shortening.       "

## 2017-12-07 NOTE — PROGRESS NOTES
SPIRITUAL HEALTH SERVICES Progress Note  [facility]    SH, LOS attempted visit. The patient was sleeping. The spouse indicated interest in SH at another time.      SH will follow as LOS persists.        Jeffrey Zelaya  Chaplain Resident

## 2017-12-07 NOTE — PROGRESS NOTES
[facility]    Infectious Disease Progress Note    Date of Service (when I saw the patient): 12/07/2017     Assessment & Plan   Antonio Ramirez is a 72 year old male who was admitted on 11/29/2017.       Impression:  1. 72 y.o male admitted with left BKA stump abscess. S/P I and D.   2. CAD, Peripheral neuropathy, alcohol abuse, CKD stage 3.   3. Noted GPC in the cultures. Further identified as MSSA     Recommendations:   On ancef for the MSSA seen in the superficial cultures, OR notes reviewed, no mention of infection,cultures from the OR are so far neg. Talked to Vascular, the area did not look particularly infected in the OR, talked with ortho no concern of osteo.   Will keep on Ancef while admitted when ready for discharge can go on 10 days on Augmentin.    Claire Salazar MD    Interval History   Remains afebrile GPC in the cultures further identified as MSSA     Physical Exam   Temp: 98.1  F (36.7  C) Temp src: Oral BP: 114/68 Pulse: 50 Heart Rate: 50 Resp: 16 SpO2: 97 % O2 Device: None (Room air) Oxygen Delivery: 2 LPM  Vitals:    11/29/17 1629 12/06/17 0600   Weight: 99.8 kg (220 lb) 105.4 kg (232 lb 5.8 oz)     Vital Signs with Ranges  Temp:  [96  F (35.6  C)-98.1  F (36.7  C)] 98.1  F (36.7  C)  Pulse:  [50] 50  Heart Rate:  [49-58] 50  Resp:  [8-16] 16  BP: (108-129)/(65-78) 114/68  SpO2:  [92 %-100 %] 97 %    Constitutional: Awake, alert,  Lungs: Clear to auscultation bilaterally, no crackles or wheezing  Cardiovascular: Regular rate and rhythm, normal S1 and S2, and no murmur noted  Abdomen: Normal bowel sounds, soft, non-distended, non-tender  Skin: dressing on the stump   Other:    Medications     lactated ringers 75 mL/hr at 12/07/17 0537       heparin  5,000 Units Subcutaneous Q8H     senna-docusate  2 tablet Oral BID     sodium chloride (PF)  3 mL Intracatheter Q8H     acetaminophen  975 mg Oral Q8H     lactobacillus rhamnosus (GG)  1 capsule Oral Daily     sodium phosphate  1 enema  Rectal Once     docusate sodium  200 mg Oral BID     gabapentin  1,200 mg Oral BID     gabapentin (NEURONTIN) tablet 2,400 mg  2,400 mg Oral Q24H     sodium chloride (PF)  3 mL Intracatheter Q8H     ceFAZolin  2 g Intravenous Q8H     potassium chloride SA  10 mEq Oral Daily     carvedilol (COREG) tablet 3.125 mg  3.125 mg Oral BID w/meals       Data   All microbiology laboratory data reviewed.  Recent Labs   Lab Test  12/07/17   0813  12/06/17   1455  12/05/17   0750  12/04/17   1955   12/04/17   0806  12/04/17   0550   WBC   --    --   7.7   --    --   6.4  5.6   HGB  8.6*   --   9.5*  10.2*   < >  8.0*  8.0*   HCT   --    --   27.9*   --    --   23.0*  23.4*   MCV   --    --   91   --    --   95  95   PLT  164  151  142*   --    --   156  127*    < > = values in this interval not displayed.     Recent Labs   Lab Test  12/05/17   0750  12/04/17   0550  12/03/17   0616   CR  1.08  1.43*  1.58*     No lab results found.  Recent Labs   Lab Test  12/06/17   1159  12/04/17   0801  12/01/17   1756  11/29/17   1811  11/29/17   1649   CULT  Culture negative monitoring continues  PENDING  No growth  Culture negative after 2 days  Culture negative monitoring continues  Light growth  Staphylococcus aureus  This isolate is presumed to be clindamycin resistant based on detection of inducible   clindamycin resistance. Erythromycin and clindamycin are resistant, therefore, they are   not recommended for use.  *  Culture negative monitoring continues  No growth  No growth

## 2017-12-07 NOTE — PLAN OF CARE
Problem: Patient Care Overview  Goal: Plan of Care/Patient Progress Review  Outcome: Improving  VSS. A&O wound vac to LLE -75 redness surrounding wound. Denies pain. Tolerating regular diet. Voiding ind. In urinal. LS clear. Cardiac WDLex yelena at baseline; tele Afib. UP with lift.

## 2017-12-07 NOTE — PROGRESS NOTES
[facility]    Hospitalist Progress Note    Assessment & Plan   Antonio Ramirez is a 72 year old male who was admitted on 11/29/2017 for left BKA abscess and found to have MSSA and has wound vac on BKA:    Impression:   Principal Problem:    Abscess of Left BKA -- S/P I&D 12/1/17, MSSA  Active Problems:    Alcoholic cirrhosis of liver (H)    Chronic kidney disease, stage III (moderate)    CAD, MI in 2007 -- S/P CABG x 3 in 2007    Prostate cancer -- S/P Radiative Seed implants in 2000 (no problems since)    Antiphospholipid Jina Syn, Hypercoag (DVT, PE) -- has IVC filt and Warfarin (currently held)    Thoracic aortic aneurysm without rupture (H)    Chronic congestive heart failure, unspecified congestive heart failure type (H)    Paroxysmal a-fib (H) -- normally on Warfarin (currently held)    Thrombocytopenia -- suspect related to alcohol use, resolved    Anemia -- acute blood loss      Plan:  Continue Antibiotics (Ancef 2 gm IV q8h).  Currently on Heparin 5000 units SQ tid -- will discuss with Dr. Howard about plans for surgery tomorrow (?might revise Left BKA in which case will hold heparin this PM), and if that is the last surgery then start back on Warfarin after that, and potentially Lovenox 1-2 days after surgery if needed.       DVT Prophylaxis: Currently on SQ heparin for DVT prophylaxis    Code Status: Full Code    Disposition: Expected discharge in ?2 days once Left BKA infection stable (infection improving and no problems with bleeding).  Might need TCU for wound care depending on wound care needs at discharge.    Davie Osborn MD  Pager [phone]  Cell Phone [phone]  Text Page (7am to 6pm)    Interval History   Overall feels fine, bowels moved yesterday.  Wound was clean when Wound vac taken off, no further bleeding reported.    Physical Exam   Temp: 98.1  F (36.7  C) Temp src: Oral BP: 114/68 Pulse: 50 Heart Rate: 50 Resp: 16 SpO2: 97 % O2 Device: None (Room air)  Oxygen Delivery: 2 LPM  Vitals:    11/29/17 1629 12/06/17 0600   Weight: 99.8 kg (220 lb) 105.4 kg (232 lb 5.8 oz)     Vital Signs with Ranges  Temp:  [96  F (35.6  C)-98.1  F (36.7  C)] 98.1  F (36.7  C)  Pulse:  [50] 50  Heart Rate:  [49-58] 50  Resp:  [8-16] 16  BP: (108-129)/(63-78) 114/68  SpO2:  [92 %-100 %] 97 %  I/O last 3 completed shifts:  In: 640 [P.O.:240; I.V.:400]  Out: 640 [Urine:640]    Constitutional: Awake, alert, cooperative, no apparent distress  Respiratory: Clear to auscultation bilaterally, no crackles or wheezing  Cardiovascular: Regular rate and rhythm, normal S1 and S2, and 2/6 systolic murmur left sternal border  GI: Normal bowel sounds, soft, non-distended, non-tender  Extrem: left BKA stump with wound vac -- but redness and swelling much improved compared to admission.   Neuro: Ox3, decreased light touch to mid shin with right leg and distal BKA on left leg.     Medications     lactated ringers 75 mL/hr at 12/07/17 0537       heparin  5,000 Units Subcutaneous Q8H     sodium chloride (PF)  3 mL Intracatheter Q8H     acetaminophen  975 mg Oral Q8H     senna-docusate  1-2 tablet Oral BID     lactobacillus rhamnosus (GG)  1 capsule Oral Daily     sodium phosphate  1 enema Rectal Once     docusate sodium  200 mg Oral BID     gabapentin  1,200 mg Oral BID     gabapentin (NEURONTIN) tablet 2,400 mg  2,400 mg Oral Q24H     sodium chloride (PF)  3 mL Intracatheter Q8H     ceFAZolin  2 g Intravenous Q8H     potassium chloride SA  10 mEq Oral Daily     carvedilol (COREG) tablet 3.125 mg  3.125 mg Oral BID w/meals       Data     Recent Labs  Lab 12/07/17  0813 12/06/17  1455 12/05/17  0750 12/04/17  1955  12/04/17  0806 12/04/17  0550  12/03/17  0616  12/01/17  1516 12/01/17  0712   WBC  --   --  7.7  --   --  6.4 5.6  --  6.5  --   --  8.3   HGB 8.6*  --  9.5* 10.2*  < > 8.0* 8.0*  < > 10.7*  < >  --  12.3*   MCV  --   --  91  --   --  95 95  --  96  --   --  94    151 142*  --   --  156  127*  --  121*  --   --  116*   INR  --   --   --   --   --  1.65*  --   --   --   --  1.65* 1.91*   NA  --   --  137  --   --   --  138  --  133  --   --  133   POTASSIUM  --   --  4.2  --   --   --  4.2  --  4.2  --   --  3.7   CHLORIDE  --   --  106  --   --   --  105  --  98  --   --  99   CO2  --   --  23  --   --   --  26  --  29  --   --  26   BUN  --   --  15  --   --   --  18  --  23  --   --  26   CR  --   --  1.08  --   --   --  1.43*  --  1.58*  < >  --  1.43*   ANIONGAP  --   --  8  --   --   --  7  --  6  --   --  8   BENNIE  --   --  8.2*  --   --   --  7.5*  --  8.5  --   --  8.3*   GLC  --   --  121*  --   --   --  104*  --  90  < >  --  93   < > = values in this interval not displayed.    Imaging:   No results found for this or any previous visit (from the past 24 hour(s)).

## 2017-12-07 NOTE — OP NOTE
DATE OF PROCEDURE:  11/29/2017       PREOPERATIVE DIAGNOSIS:  Infected left below-knee amputation stump with subcutaneous abscess.      POSTOPERATIVE DIAGNOSIS:  Infected left below-knee amputation stump with subcutaneous abscess.      PROCEDURE:  I&D of the left below-knee amputation stump and placement of a wound VAC.      INDICATIONS FOR PROCEDURE:  Antonio Ramirez is a 72-year-old male who last week presented with an abscess involving the stump of his below-knee amputation on the left.  He was brought to the operating room, at which time his cultures grew out Staph aureus.  He had an I&D and placement of a wound VAC.  Approximately 48 hours ago, he was taken back to the operating room on an urgent basis to treat what turned out to be an arterial bleed.  He had been anticoagulated with heparin to treat his antiphospholipid antibody hypercoagulability syndrome.  He had dislodged a clot and had brisk arterial bleeding.  He was brought back by the Vascular Service.  With suture ligation, they were able to stop the bleeding, and another wound VAC was applied.  He has had this current wound VAC in for approximately 36 hours.  His hemoglobin is stabilized.  He is brought to the operating room for the above-stated procedure.      ANESTHESIA:  General.      ASSISTANT:  Haroon Santamaria PA-C      DESCRIPTION PROCEDURE:  The patient was taken to the operating room and placed on the operating table in the supine position.  After adequate induction of a general anesthetic, a pneumatic tourniquet was applied to left thigh; however, it was not used during the case.  We then performed a sterile prep and drape of the left lower extremity.  After sterile prep and drape, the wound was explored.  There was no evidence of any arterial bleeding.  There was very minimal bleeding.  There was good granulation tissue.  A culture was sent for Gram stain and culture.  We then irrigated first with a liter of normal saline followed by a liter of  antibiotic saline using the pulsatile jet lavage.  We then placed another wound VAC.  We had a good seal.  The patient tolerated the operative procedure.  There were no intraoperative complications.  The patient was sent to [facility] in stable condition.        Plan will be for the patient to continue with the wound VAC and continue on IV antibiotics.  We will likely bring him back on Friday for repeat I&D and possible shortening of his tibia to eventually close his wound.  This of course will be dependent on whether the cultures are negative.  He will be anticoagulated with heparin again, and we will continue him on IV antibiotics.         AUDRA SMITH MD             D: 2017 12:00   T: 2017 22:42   MT: [id number]      Name:     GALILEA LUGO   MRN:      [id number]        Account:        [id number]   :      1945           Procedure Date: 2017      Document: [id number]

## 2017-12-07 NOTE — PLAN OF CARE
Problem: Patient Care Overview  Goal: Plan of Care/Patient Progress Review  Outcome: No Change  POD#0 I&D of L BKA site. Bradycardic at times, other VSS. Refusing capnography, sating in 90s on room air. A&O, calls appropriately. Independently positioning himself in bed, education of pressure relief given. Denies pain. Wound vac on L BKA intact. Elevating left extremity on pillows. Plan for repeat I&D on Friday. Continue to monitor.

## 2017-12-07 NOTE — PROGRESS NOTES
"BRIEF NUTRITION ASSESSMENT      REASON FOR ASSESSMENT:  los    NUTRITION HISTORY:  Pt was not very forthcoming with hx, he was rather evasive.  States he doesn't follow any special diet, \"I do my own thing.\"  States he eats 1 meal/day \"more or less\".   When I pointed out that he's lost wt (see below) and asked what that was attributed to he stated, \"I was too fat.\"  Per H&P:  chronic alcohol use with alcohol related liver disease in past,    CURRENT DIET AND INTAKE:  Diet:  Regular   Per flow sheets, intake has been 50-75%. \"Typical hospital food, not much to say\".  He states he's also been order take-out some meals.                ANTHROPOMETRICS:  Height: 6' 1\"  Weight: 105.4 kg  BMI: 30.66 kg/m2  IBW:  79.4 kg - adjusted for left BKA  Weight Status: Obesity Grade I BMI 30-34.9  %IBW: 133%  Weight History: Pt has lost 14# (6%) x 6 months, he is unable to give a reason but is not concerned.  Wt Readings from Last 10 Encounters:   12/06/17 105.4 kg (232 lb 5.8 oz)   09/25/17 102.1 kg (225 lb)   08/31/17 106.6 kg (235 lb)   06/30/17 115.2 kg (254 lb)   06/27/17 111.6 kg (246 lb)   05/30/17 111.6 kg (246 lb)   05/24/17 114.3 kg (251 lb 14.4 oz)   10/12/16 103.6 kg (228 lb 5 oz)   09/19/14 96.3 kg (212 lb 6.4 oz)       LABS:  Labs noted    MALNUTRITION:  Patient does not meet two of the following criteria necessary for diagnosing malnutrition: significant weight loss, reduced intake, subcutaneous fat loss, muscle loss or fluid retention    NUTRITION INTERVENTION:  Nutrition Diagnosis:  No nutrition diagnosis at this time.    Implementation:  Nutrition Education:  Per Provider order if indicated    FOLLOW UP/MONITORING:   Will re-evaluate in 7 - 10 days, or sooner, if re-consulted.    Ya Denny RD  Pager [phone] (M-F)            [phone] (W/E & Hol)              "

## 2017-12-08 ENCOUNTER — ANESTHESIA EVENT (OUTPATIENT)
Dept: SURGERY | Facility: CLINIC | Age: 72
DRG: 464 | End: 2017-12-08
Payer: MEDICARE

## 2017-12-08 ENCOUNTER — ANESTHESIA (OUTPATIENT)
Dept: SURGERY | Facility: CLINIC | Age: 72
DRG: 464 | End: 2017-12-08
Payer: MEDICARE

## 2017-12-08 ENCOUNTER — APPOINTMENT (OUTPATIENT)
Dept: OCCUPATIONAL THERAPY | Facility: CLINIC | Age: 72
DRG: 464 | End: 2017-12-08
Payer: MEDICARE

## 2017-12-08 ENCOUNTER — APPOINTMENT (OUTPATIENT)
Dept: PHYSICAL THERAPY | Facility: CLINIC | Age: 72
DRG: 464 | End: 2017-12-08
Attending: ORTHOPAEDIC SURGERY
Payer: MEDICARE

## 2017-12-08 LAB
ANION GAP SERPL CALCULATED.3IONS-SCNC: 5 MMOL/L (ref 3–14)
BACTERIA SPEC CULT: NO GROWTH
BACTERIA SPEC CULT: NORMAL
BUN SERPL-MCNC: 10 MG/DL (ref 7–30)
CALCIUM SERPL-MCNC: 8.6 MG/DL (ref 8.5–10.1)
CHLORIDE SERPL-SCNC: 107 MMOL/L (ref 94–109)
CO2 SERPL-SCNC: 25 MMOL/L (ref 20–32)
CREAT SERPL-MCNC: 1.19 MG/DL (ref 0.66–1.25)
ERYTHROCYTE [DISTWIDTH] IN BLOOD BY AUTOMATED COUNT: 15.8 % (ref 10–15)
GFR SERPL CREATININE-BSD FRML MDRD: 60 ML/MIN/1.7M2
GLUCOSE SERPL-MCNC: 84 MG/DL (ref 70–99)
HCT VFR BLD AUTO: 28.1 % (ref 40–53)
HGB BLD-MCNC: 9.2 G/DL (ref 13.3–17.7)
INR PPP: 1.25 (ref 0.86–1.14)
Lab: NORMAL
Lab: NORMAL
MCH RBC QN AUTO: 31.4 PG (ref 26.5–33)
MCHC RBC AUTO-ENTMCNC: 32.7 G/DL (ref 31.5–36.5)
MCV RBC AUTO: 96 FL (ref 78–100)
PLATELET # BLD AUTO: 184 10E9/L (ref 150–450)
POTASSIUM SERPL-SCNC: 4.2 MMOL/L (ref 3.4–5.3)
RBC # BLD AUTO: 2.93 10E12/L (ref 4.4–5.9)
SODIUM SERPL-SCNC: 137 MMOL/L (ref 133–144)
SPECIMEN SOURCE: NORMAL
SPECIMEN SOURCE: NORMAL
WBC # BLD AUTO: 7.4 10E9/L (ref 4–11)

## 2017-12-08 PROCEDURE — 85610 PROTHROMBIN TIME: CPT | Performed by: INTERNAL MEDICINE

## 2017-12-08 PROCEDURE — 40000133 ZZH STATISTIC OT WARD VISIT

## 2017-12-08 PROCEDURE — 12000007 ZZH R&B INTERMEDIATE

## 2017-12-08 PROCEDURE — 37000009 ZZH ANESTHESIA TECHNICAL FEE, EACH ADDTL 15 MIN: Performed by: ORTHOPAEDIC SURGERY

## 2017-12-08 PROCEDURE — E2402 NEG PRESS WOUND THERAPY PUMP: HCPCS

## 2017-12-08 PROCEDURE — 25000128 H RX IP 250 OP 636: Performed by: ORTHOPAEDIC SURGERY

## 2017-12-08 PROCEDURE — 25000132 ZZH RX MED GY IP 250 OP 250 PS 637: Mod: GY | Performed by: INTERNAL MEDICINE

## 2017-12-08 PROCEDURE — 25000128 H RX IP 250 OP 636: Performed by: ANESTHESIOLOGY

## 2017-12-08 PROCEDURE — 27210794 ZZH OR GENERAL SUPPLY STERILE: Performed by: ORTHOPAEDIC SURGERY

## 2017-12-08 PROCEDURE — 36000050 ZZH SURGERY LEVEL 2 1ST 30 MIN: Performed by: ORTHOPAEDIC SURGERY

## 2017-12-08 PROCEDURE — 97530 THERAPEUTIC ACTIVITIES: CPT | Mod: GP | Performed by: PHYSICAL THERAPY ASSISTANT

## 2017-12-08 PROCEDURE — A9270 NON-COVERED ITEM OR SERVICE: HCPCS | Mod: GY | Performed by: ORTHOPAEDIC SURGERY

## 2017-12-08 PROCEDURE — 97110 THERAPEUTIC EXERCISES: CPT | Mod: GP | Performed by: PHYSICAL THERAPY ASSISTANT

## 2017-12-08 PROCEDURE — 25000566 ZZH SEVOFLURANE, EA 15 MIN: Performed by: ORTHOPAEDIC SURGERY

## 2017-12-08 PROCEDURE — 71000012 ZZH RECOVERY PHASE 1 LEVEL 1 FIRST HR: Performed by: ORTHOPAEDIC SURGERY

## 2017-12-08 PROCEDURE — 37000008 ZZH ANESTHESIA TECHNICAL FEE, 1ST 30 MIN: Performed by: ORTHOPAEDIC SURGERY

## 2017-12-08 PROCEDURE — 27210995 ZZH RX 272: Performed by: ORTHOPAEDIC SURGERY

## 2017-12-08 PROCEDURE — 85027 COMPLETE CBC AUTOMATED: CPT | Performed by: INTERNAL MEDICINE

## 2017-12-08 PROCEDURE — 36000052 ZZH SURGERY LEVEL 2 EA 15 ADDTL MIN: Performed by: ORTHOPAEDIC SURGERY

## 2017-12-08 PROCEDURE — 25000128 H RX IP 250 OP 636: Performed by: NURSE ANESTHETIST, CERTIFIED REGISTERED

## 2017-12-08 PROCEDURE — 36415 COLL VENOUS BLD VENIPUNCTURE: CPT | Performed by: INTERNAL MEDICINE

## 2017-12-08 PROCEDURE — 25000132 ZZH RX MED GY IP 250 OP 250 PS 637: Mod: GY | Performed by: ORTHOPAEDIC SURGERY

## 2017-12-08 PROCEDURE — 97535 SELF CARE MNGMENT TRAINING: CPT | Mod: GO

## 2017-12-08 PROCEDURE — 25000128 H RX IP 250 OP 636: Performed by: INTERNAL MEDICINE

## 2017-12-08 PROCEDURE — 0Y6J0Z1 DETACHMENT AT LEFT LOWER LEG, HIGH, OPEN APPROACH: ICD-10-PCS | Performed by: ORTHOPAEDIC SURGERY

## 2017-12-08 PROCEDURE — 99232 SBSQ HOSP IP/OBS MODERATE 35: CPT | Performed by: INTERNAL MEDICINE

## 2017-12-08 PROCEDURE — 71000013 ZZH RECOVERY PHASE 1 LEVEL 1 EA ADDTL HR: Performed by: ORTHOPAEDIC SURGERY

## 2017-12-08 PROCEDURE — 25000125 ZZHC RX 250: Performed by: NURSE ANESTHETIST, CERTIFIED REGISTERED

## 2017-12-08 PROCEDURE — A9270 NON-COVERED ITEM OR SERVICE: HCPCS | Mod: GY | Performed by: INTERNAL MEDICINE

## 2017-12-08 PROCEDURE — 25800025 ZZH RX 258: Performed by: ORTHOPAEDIC SURGERY

## 2017-12-08 PROCEDURE — 40000169 ZZH STATISTIC PRE-PROCEDURE ASSESSMENT I: Performed by: ORTHOPAEDIC SURGERY

## 2017-12-08 PROCEDURE — 40000193 ZZH STATISTIC PT WARD VISIT: Performed by: PHYSICAL THERAPY ASSISTANT

## 2017-12-08 PROCEDURE — 80048 BASIC METABOLIC PNL TOTAL CA: CPT | Performed by: INTERNAL MEDICINE

## 2017-12-08 RX ORDER — CEFAZOLIN SODIUM 2 G/100ML
2 INJECTION, SOLUTION INTRAVENOUS
Status: COMPLETED | OUTPATIENT
Start: 2017-12-08 | End: 2017-12-08

## 2017-12-08 RX ORDER — ACETAMINOPHEN 325 MG/1
650 TABLET ORAL EVERY 4 HOURS PRN
Status: DISCONTINUED | OUTPATIENT
Start: 2017-12-11 | End: 2017-12-10 | Stop reason: HOSPADM

## 2017-12-08 RX ORDER — LIDOCAINE HYDROCHLORIDE 20 MG/ML
INJECTION, SOLUTION INFILTRATION; PERINEURAL PRN
Status: DISCONTINUED | OUTPATIENT
Start: 2017-12-08 | End: 2017-12-08

## 2017-12-08 RX ORDER — EPHEDRINE SULFATE 50 MG/ML
INJECTION, SOLUTION INTRAMUSCULAR; INTRAVENOUS; SUBCUTANEOUS PRN
Status: DISCONTINUED | OUTPATIENT
Start: 2017-12-08 | End: 2017-12-08

## 2017-12-08 RX ORDER — FENTANYL CITRATE 50 UG/ML
INJECTION, SOLUTION INTRAMUSCULAR; INTRAVENOUS PRN
Status: DISCONTINUED | OUTPATIENT
Start: 2017-12-08 | End: 2017-12-08

## 2017-12-08 RX ORDER — FENTANYL CITRATE 50 UG/ML
25-50 INJECTION, SOLUTION INTRAMUSCULAR; INTRAVENOUS EVERY 5 MIN PRN
Status: DISCONTINUED | OUTPATIENT
Start: 2017-12-08 | End: 2017-12-08 | Stop reason: HOSPADM

## 2017-12-08 RX ORDER — ONDANSETRON 2 MG/ML
4 INJECTION INTRAMUSCULAR; INTRAVENOUS EVERY 30 MIN PRN
Status: DISCONTINUED | OUTPATIENT
Start: 2017-12-08 | End: 2017-12-08 | Stop reason: HOSPADM

## 2017-12-08 RX ORDER — MAGNESIUM HYDROXIDE 1200 MG/15ML
LIQUID ORAL PRN
Status: DISCONTINUED | OUTPATIENT
Start: 2017-12-08 | End: 2017-12-08 | Stop reason: HOSPADM

## 2017-12-08 RX ORDER — SODIUM CHLORIDE, SODIUM LACTATE, POTASSIUM CHLORIDE, CALCIUM CHLORIDE 600; 310; 30; 20 MG/100ML; MG/100ML; MG/100ML; MG/100ML
INJECTION, SOLUTION INTRAVENOUS CONTINUOUS
Status: DISCONTINUED | OUTPATIENT
Start: 2017-12-08 | End: 2017-12-08 | Stop reason: HOSPADM

## 2017-12-08 RX ORDER — SODIUM CHLORIDE, SODIUM LACTATE, POTASSIUM CHLORIDE, CALCIUM CHLORIDE 600; 310; 30; 20 MG/100ML; MG/100ML; MG/100ML; MG/100ML
INJECTION, SOLUTION INTRAVENOUS CONTINUOUS
Status: DISCONTINUED | OUTPATIENT
Start: 2017-12-08 | End: 2017-12-10 | Stop reason: HOSPADM

## 2017-12-08 RX ORDER — HEPARIN SODIUM 5000 [USP'U]/.5ML
5000 INJECTION, SOLUTION INTRAVENOUS; SUBCUTANEOUS EVERY 8 HOURS
Status: DISCONTINUED | OUTPATIENT
Start: 2017-12-09 | End: 2017-12-10 | Stop reason: HOSPADM

## 2017-12-08 RX ORDER — OXYCODONE HYDROCHLORIDE 5 MG/1
5-10 TABLET ORAL EVERY 4 HOURS PRN
Status: DISCONTINUED | OUTPATIENT
Start: 2017-12-08 | End: 2017-12-10 | Stop reason: HOSPADM

## 2017-12-08 RX ORDER — ONDANSETRON 4 MG/1
4 TABLET, ORALLY DISINTEGRATING ORAL EVERY 30 MIN PRN
Status: DISCONTINUED | OUTPATIENT
Start: 2017-12-08 | End: 2017-12-08 | Stop reason: HOSPADM

## 2017-12-08 RX ORDER — LIDOCAINE 40 MG/G
CREAM TOPICAL
Status: DISCONTINUED | OUTPATIENT
Start: 2017-12-08 | End: 2017-12-10 | Stop reason: HOSPADM

## 2017-12-08 RX ORDER — LABETALOL HYDROCHLORIDE 5 MG/ML
10 INJECTION, SOLUTION INTRAVENOUS
Status: DISCONTINUED | OUTPATIENT
Start: 2017-12-08 | End: 2017-12-08 | Stop reason: HOSPADM

## 2017-12-08 RX ORDER — NALOXONE HYDROCHLORIDE 0.4 MG/ML
.1-.4 INJECTION, SOLUTION INTRAMUSCULAR; INTRAVENOUS; SUBCUTANEOUS
Status: DISCONTINUED | OUTPATIENT
Start: 2017-12-08 | End: 2017-12-08

## 2017-12-08 RX ORDER — HYDROMORPHONE HYDROCHLORIDE 1 MG/ML
.3-.5 INJECTION, SOLUTION INTRAMUSCULAR; INTRAVENOUS; SUBCUTANEOUS EVERY 5 MIN PRN
Status: DISCONTINUED | OUTPATIENT
Start: 2017-12-08 | End: 2017-12-08 | Stop reason: HOSPADM

## 2017-12-08 RX ORDER — HYDROMORPHONE HYDROCHLORIDE 1 MG/ML
.3-.5 INJECTION, SOLUTION INTRAMUSCULAR; INTRAVENOUS; SUBCUTANEOUS
Status: DISCONTINUED | OUTPATIENT
Start: 2017-12-08 | End: 2017-12-10 | Stop reason: HOSPADM

## 2017-12-08 RX ORDER — DEXAMETHASONE SODIUM PHOSPHATE 4 MG/ML
INJECTION, SOLUTION INTRA-ARTICULAR; INTRALESIONAL; INTRAMUSCULAR; INTRAVENOUS; SOFT TISSUE PRN
Status: DISCONTINUED | OUTPATIENT
Start: 2017-12-08 | End: 2017-12-08

## 2017-12-08 RX ORDER — NALOXONE HYDROCHLORIDE 0.4 MG/ML
.1-.4 INJECTION, SOLUTION INTRAMUSCULAR; INTRAVENOUS; SUBCUTANEOUS
Status: DISCONTINUED | OUTPATIENT
Start: 2017-12-08 | End: 2017-12-10 | Stop reason: HOSPADM

## 2017-12-08 RX ORDER — PROPOFOL 10 MG/ML
INJECTION, EMULSION INTRAVENOUS PRN
Status: DISCONTINUED | OUTPATIENT
Start: 2017-12-08 | End: 2017-12-08

## 2017-12-08 RX ORDER — ACETAMINOPHEN 325 MG/1
975 TABLET ORAL EVERY 8 HOURS
Status: DISCONTINUED | OUTPATIENT
Start: 2017-12-08 | End: 2017-12-10 | Stop reason: HOSPADM

## 2017-12-08 RX ADMIN — DEXAMETHASONE SODIUM PHOSPHATE 4 MG: 4 INJECTION, SOLUTION INTRA-ARTICULAR; INTRALESIONAL; INTRAMUSCULAR; INTRAVENOUS; SOFT TISSUE at 18:16

## 2017-12-08 RX ADMIN — GABAPENTIN 1200 MG: 300 CAPSULE ORAL at 08:37

## 2017-12-08 RX ADMIN — Medication 0.5 MG: at 19:31

## 2017-12-08 RX ADMIN — Medication 0.5 MG: at 20:08

## 2017-12-08 RX ADMIN — Medication 5 MG: at 18:31

## 2017-12-08 RX ADMIN — SODIUM CHLORIDE, POTASSIUM CHLORIDE, SODIUM LACTATE AND CALCIUM CHLORIDE: 600; 310; 30; 20 INJECTION, SOLUTION INTRAVENOUS at 17:56

## 2017-12-08 RX ADMIN — Medication 1 MG: at 01:55

## 2017-12-08 RX ADMIN — PROPOFOL 150 MG: 10 INJECTION, EMULSION INTRAVENOUS at 18:10

## 2017-12-08 RX ADMIN — CEFAZOLIN SODIUM 2 G: 2 INJECTION, SOLUTION INTRAVENOUS at 18:13

## 2017-12-08 RX ADMIN — LIDOCAINE HYDROCHLORIDE 40 MG: 20 INJECTION, SOLUTION INFILTRATION; PERINEURAL at 18:10

## 2017-12-08 RX ADMIN — MIDAZOLAM 2 MG: 1 INJECTION INTRAMUSCULAR; INTRAVENOUS at 17:57

## 2017-12-08 RX ADMIN — CEFAZOLIN SODIUM 2 G: 2 INJECTION, SOLUTION INTRAVENOUS at 13:04

## 2017-12-08 RX ADMIN — FENTANYL CITRATE 50 MCG: 50 INJECTION, SOLUTION INTRAMUSCULAR; INTRAVENOUS at 18:10

## 2017-12-08 RX ADMIN — FENTANYL CITRATE 50 MCG: 50 INJECTION, SOLUTION INTRAMUSCULAR; INTRAVENOUS at 18:26

## 2017-12-08 RX ADMIN — Medication 0.3 MG: at 21:33

## 2017-12-08 RX ADMIN — ONDANSETRON 4 MG: 2 INJECTION INTRAMUSCULAR; INTRAVENOUS at 18:11

## 2017-12-08 RX ADMIN — SODIUM CHLORIDE, POTASSIUM CHLORIDE, SODIUM LACTATE AND CALCIUM CHLORIDE: 600; 310; 30; 20 INJECTION, SOLUTION INTRAVENOUS at 10:05

## 2017-12-08 RX ADMIN — SUCCINYLCHOLINE CHLORIDE 60 MG: 20 INJECTION, SOLUTION INTRAMUSCULAR; INTRAVENOUS at 18:11

## 2017-12-08 RX ADMIN — Medication 5 MG: at 18:21

## 2017-12-08 RX ADMIN — CEFAZOLIN SODIUM 2 G: 2 INJECTION, SOLUTION INTRAVENOUS at 04:14

## 2017-12-08 RX ADMIN — SODIUM CHLORIDE, POTASSIUM CHLORIDE, SODIUM LACTATE AND CALCIUM CHLORIDE: 600; 310; 30; 20 INJECTION, SOLUTION INTRAVENOUS at 21:39

## 2017-12-08 ASSESSMENT — PAIN DESCRIPTION - DESCRIPTORS
DESCRIPTORS: OTHER (COMMENT)
DESCRIPTORS: CONSTANT

## 2017-12-08 NOTE — PLAN OF CARE
Problem: Patient Care Overview  Goal: Plan of Care/Patient Progress Review  Outcome: No Change  [id number]: A/O. VSS on RA. Pt refused ambulation. Wound vac patent, no output. Plan for wound closure tomorrow.

## 2017-12-08 NOTE — PROGRESS NOTES
[facility]  Met with pt at the request of SHIMON Hernandez Care Coordinator to discuss possible home care services. Provided general overview of home care services including homebound status requirement.  Answered patient's questions. Plan is for likely discharge over the weekend; at this it is unclear if he will discharge to skilled nursing facility or directly home.  Provided HC brochure with 24 hour phone number for [facility] for any questions or concerns.

## 2017-12-08 NOTE — PLAN OF CARE
Problem: Patient Care Overview  Goal: Plan of Care/Patient Progress Review  Outcome: No Change  Wound vac at 75. Dressing intact. To OR this evening.

## 2017-12-08 NOTE — PROGRESS NOTES
Care Transition Initial Assessment - FAHEEM  Reason For Consult: discharge planning  Met with: Patient    Principal Problem:    Abscess of Left BKA -- S/P I&D 12/1/17, MSSA  Active Problems:    Alcoholic cirrhosis of liver (H)    Chronic kidney disease, stage III (moderate)    CAD, MI in 2007 -- S/P CABG x 3 in 2007    Prostate cancer -- S/P Radiative Seed implants in 2000 (no problems since)    Antiphospholipid Jina Syn, Hypercoag (DVT, PE) -- has IVC filt and Warfarin    Thoracic aortic aneurysm without rupture (H)    Chronic congestive heart failure, unspecified congestive heart failure type (H)    Paroxysmal a-fib (H)    Cellulitis and abscess of leg    Thrombocytopenia -- suspect related to alcohol use    Abscess    Infection of left below knee amputation (H)         DATA  Lives With: spouse  Living Arrangements: apartment  Description of Support System: Supportive, Involved  Who is your support system?: Wife  Support Assessment: Adequate family and caregiver support.   Identified issues/concerns regarding health management:    SW received request for consult to assist with discharge planning. Patient admitted Inpatient Status on 11/29/17 for Abcess of Left BKA. Tentative discharge date 12/9/17. SW met with patient. Patient lives with his wife in an apartment. Patient is mostly independent at baseline. Discussed with patient returning home with home care versus TCU. Answered patient's questions regarding TCU (services provided), coverage, facilities, etc. Patient stating he feels TCU would be the best option currently due to his needs at discharge. Patient stating his first choice is [facility] due to location. Patient requesting SW send a referral. Patient stated he would likely be interested in home care following TCU, should he need it. FAHEEM sent referral per protocol to [facility].      Resources List: Transitional Care     Quality Of Family Relationships: supportive, helpful, involved  Transportation Available: family or  friend will provide      ASSESSMENT  Cognitive Status:  awake, alert and oriented  Concerns to be addressed: discharge planning.       PLAN  Financial costs for the patient includes: N/A.  Patient given options and choices for discharge: Discussed home with home care vs TCU.  Patient/family is agreeable to the plan?  Yes  Patient Goals and Preferences: TCU.  Patient anticipates discharging to: TCU.    Gracy Jacobs, CRISTOBAL, LGSW

## 2017-12-08 NOTE — PLAN OF CARE
Problem: Skin and Soft Tissue Infection (Adult)  Goal: Signs and Symptoms of Listed Potential Problems Will be Absent, Minimized or Managed (Skin and Soft Tissue Infection)  Signs and symptoms of listed potential problems will be absent, minimized or managed by discharge/transition of care (reference Skin and Soft Tissue Infection (Adult) CPG).    Outcome: No Change  Wound vac patent, no measurable output, afebrile, denies pain . To OR tomorrow for wound closure.

## 2017-12-08 NOTE — PROGRESS NOTES
[facility]    Infectious Disease Progress Note    Date of Service (when I saw the patient): 12/08/2017     Assessment & Plan   Antonio Ramirez is a 72 year old male who was admitted on 11/29/2017.       Impression:  1. 72 y.o male admitted with left BKA stump abscess. S/P I and D.   2. CAD, Peripheral neuropathy, alcohol abuse, CKD stage 3.   3. Noted GPC in the cultures. Further identified as MSSA     Recommendations:   On ancef for the MSSA seen in the superficial cultures, OR notes reviewed, no mention of infection,cultures from the OR are so far neg. Talked to Vascular, the area did not look particularly infected in the OR, talked with ortho no concern of osteo.   Will keep on Ancef while admitted when ready for discharge can go on 10 days on Augmentin.  Signing off please call with ques.       Claire Salazar MD    Interval History   Remains afebrile GPC in the cultures further identified as MSSA     Physical Exam   Temp: 98.1  F (36.7  C) Temp src: Oral BP: 114/65 Pulse: 55 Heart Rate: 57 Resp: 16 SpO2: 94 % O2 Device: None (Room air)    Vitals:    11/29/17 1629 12/06/17 0600 12/08/17 0736   Weight: 99.8 kg (220 lb) 105.4 kg (232 lb 5.8 oz) 105.8 kg (233 lb 4 oz)     Vital Signs with Ranges  Temp:  [98.1  F (36.7  C)-98.4  F (36.9  C)] 98.1  F (36.7  C)  Pulse:  [49-58] 55  Heart Rate:  [57] 57  Resp:  [16-18] 16  BP: ()/(59-71) 114/65  SpO2:  [94 %-97 %] 94 %    Constitutional: Awake, alert,  Lungs: Clear to auscultation bilaterally, no crackles or wheezing  Cardiovascular: Regular rate and rhythm, normal S1 and S2, and no murmur noted  Abdomen: Normal bowel sounds, soft, non-distended, non-tender  Skin: dressing on the stump   Other:    Medications     lactated ringers 75 mL/hr at 12/08/17 1005       senna-docusate  2 tablet Oral BID     sodium chloride (PF)  3 mL Intracatheter Q8H     acetaminophen  975 mg Oral Q8H     lactobacillus rhamnosus (GG)  1 capsule Oral Daily     sodium  phosphate  1 enema Rectal Once     docusate sodium  200 mg Oral BID     gabapentin  1,200 mg Oral BID     gabapentin (NEURONTIN) tablet 2,400 mg  2,400 mg Oral Q24H     sodium chloride (PF)  3 mL Intracatheter Q8H     ceFAZolin  2 g Intravenous Q8H     potassium chloride SA  10 mEq Oral Daily     carvedilol (COREG) tablet 3.125 mg  3.125 mg Oral BID w/meals       Data   All microbiology laboratory data reviewed.  Recent Labs   Lab Test  12/08/17   0750  12/07/17   0813  12/06/17   1455  12/05/17   0750   12/04/17   0806   WBC  7.4   --    --   7.7   --   6.4   HGB  9.2*  8.6*   --   9.5*   < >  8.0*   HCT  28.1*   --    --   27.9*   --   23.0*   MCV  96   --    --   91   --   95   PLT  184  164  151  142*   --   156    < > = values in this interval not displayed.     Recent Labs   Lab Test  12/08/17   0750  12/05/17   0750  12/04/17   0550   CR  1.19  1.08  1.43*     No lab results found.  Recent Labs   Lab Test  12/06/17   1159  12/04/17   0801  12/01/17   1756  11/29/17   1811  11/29/17   1649   CULT  No growth  Culture negative monitoring continues  Culture negative after 3 days  No growth  Culture negative monitoring continues  Light growth  Staphylococcus aureus  This isolate is presumed to be clindamycin resistant based on detection of inducible   clindamycin resistance. Erythromycin and clindamycin are resistant, therefore, they are   not recommended for use.  *  Culture negative monitoring continues  No growth  No growth

## 2017-12-08 NOTE — PLAN OF CARE
Problem: Patient Care Overview  Goal: Plan of Care/Patient Progress Review  Discharge Planner PT   Patient plan for discharge: None Stated.  Though pt asking questions about Krista.  Current status: Pt performed bed mobility independently.  Sit to/from stand transfers with min assist and mild unsteadiness noted initially.  Performed pre-gait activities in standing.  Pt declined further activity at this time.   Barriers to return to prior living situation: Impaired transfers and gait. Not at baseline, fall risk. Weak decreased balance.  Recommendations for discharge: Home with home care.  Rationale for recommendations: Would benefit from on going therapy services to progress functional independence and safety with mobility.        Entered by: Mary Benedict 12/08/2017 12:28 PM

## 2017-12-08 NOTE — PLAN OF CARE
Problem: Patient Care Overview  Goal: Plan of Care/Patient Progress Review  Outcome: No Change  Pt A&Ox4. VSS on RA. Tele A fib CVR, bradycardia. Bedrest overnight. NPO except meds since midnight. Denies pain. Wound vac to L stump, patent, no output. PIV infusing LR 75ml/hr. Plan for wound closure procedure today, no exact time yet. Gave melatonin at HS. Pt declined Tylenol scheduled this AM. Voids using at bedside. +BS. No BM this shift. Nursing continue to monitor.

## 2017-12-09 ENCOUNTER — APPOINTMENT (OUTPATIENT)
Dept: OCCUPATIONAL THERAPY | Facility: CLINIC | Age: 72
DRG: 464 | End: 2017-12-09
Payer: MEDICARE

## 2017-12-09 LAB
BLD PROD TYP BPU: NORMAL
BLD PROD TYP BPU: NORMAL
HGB BLD-MCNC: 9.3 G/DL (ref 13.3–17.7)
INR PPP: 1.16 (ref 0.86–1.14)
NUM BPU REQUESTED: 0
NUM BPU REQUESTED: 0
PLATELET # BLD AUTO: 184 10E9/L (ref 150–450)

## 2017-12-09 PROCEDURE — 25000132 ZZH RX MED GY IP 250 OP 250 PS 637: Mod: GY | Performed by: PHYSICIAN ASSISTANT

## 2017-12-09 PROCEDURE — 25000128 H RX IP 250 OP 636: Performed by: INTERNAL MEDICINE

## 2017-12-09 PROCEDURE — A9270 NON-COVERED ITEM OR SERVICE: HCPCS | Mod: GY | Performed by: INTERNAL MEDICINE

## 2017-12-09 PROCEDURE — 99239 HOSP IP/OBS DSCHRG MGMT >30: CPT | Performed by: HOSPITALIST

## 2017-12-09 PROCEDURE — A9270 NON-COVERED ITEM OR SERVICE: HCPCS | Mod: GY | Performed by: ORTHOPAEDIC SURGERY

## 2017-12-09 PROCEDURE — 12000007 ZZH R&B INTERMEDIATE

## 2017-12-09 PROCEDURE — 40000133 ZZH STATISTIC OT WARD VISIT

## 2017-12-09 PROCEDURE — 25000132 ZZH RX MED GY IP 250 OP 250 PS 637: Mod: GY | Performed by: INTERNAL MEDICINE

## 2017-12-09 PROCEDURE — 25000128 H RX IP 250 OP 636: Performed by: ORTHOPAEDIC SURGERY

## 2017-12-09 PROCEDURE — 36415 COLL VENOUS BLD VENIPUNCTURE: CPT | Performed by: ORTHOPAEDIC SURGERY

## 2017-12-09 PROCEDURE — 97535 SELF CARE MNGMENT TRAINING: CPT | Mod: GO

## 2017-12-09 PROCEDURE — 25000132 ZZH RX MED GY IP 250 OP 250 PS 637: Mod: GY | Performed by: ORTHOPAEDIC SURGERY

## 2017-12-09 PROCEDURE — 85018 HEMOGLOBIN: CPT | Performed by: ORTHOPAEDIC SURGERY

## 2017-12-09 PROCEDURE — A9270 NON-COVERED ITEM OR SERVICE: HCPCS | Mod: GY | Performed by: PHYSICIAN ASSISTANT

## 2017-12-09 PROCEDURE — 85049 AUTOMATED PLATELET COUNT: CPT | Performed by: ORTHOPAEDIC SURGERY

## 2017-12-09 PROCEDURE — 85610 PROTHROMBIN TIME: CPT | Performed by: ORTHOPAEDIC SURGERY

## 2017-12-09 RX ORDER — LACTOBACILLUS RHAMNOSUS GG 10B CELL
1 CAPSULE ORAL DAILY
Status: ON HOLD | DISCHARGE
Start: 2017-12-10 | End: 2018-01-29

## 2017-12-09 RX ORDER — POLYETHYLENE GLYCOL 3350 17 G/17G
17 POWDER, FOR SOLUTION ORAL 2 TIMES DAILY PRN
Qty: 7 PACKET | DISCHARGE
Start: 2017-12-09 | End: 2017-12-22

## 2017-12-09 RX ORDER — QUETIAPINE FUMARATE 25 MG/1
12.5 TABLET, FILM COATED ORAL EVERY 6 HOURS PRN
Qty: 60 TABLET | DISCHARGE
Start: 2017-12-09 | End: 2017-12-12

## 2017-12-09 RX ORDER — DOCUSATE SODIUM 100 MG/1
200 CAPSULE, LIQUID FILLED ORAL 2 TIMES DAILY
Qty: 60 CAPSULE | DISCHARGE
Start: 2017-12-09 | End: 2017-12-22

## 2017-12-09 RX ORDER — ACETAMINOPHEN 325 MG/1
650 TABLET ORAL EVERY 4 HOURS PRN
Qty: 100 TABLET | DISCHARGE
Start: 2017-12-11 | End: 2018-01-05

## 2017-12-09 RX ORDER — OXYCODONE HYDROCHLORIDE 5 MG/1
5-10 TABLET ORAL EVERY 4 HOURS PRN
Qty: 18 TABLET | Refills: 0 | Status: SHIPPED | OUTPATIENT
Start: 2017-12-09 | End: 2017-12-18

## 2017-12-09 RX ORDER — ACETAMINOPHEN 325 MG/1
975 TABLET ORAL EVERY 8 HOURS
Qty: 100 TABLET | DISCHARGE
Start: 2017-12-09 | End: 2017-12-12

## 2017-12-09 RX ORDER — WARFARIN SODIUM 7.5 MG/1
7.5 TABLET ORAL ONCE
Status: COMPLETED | OUTPATIENT
Start: 2017-12-09 | End: 2017-12-09

## 2017-12-09 RX ORDER — WARFARIN SODIUM 5 MG/1
5 TABLET ORAL ONCE
Status: COMPLETED | OUTPATIENT
Start: 2017-12-09 | End: 2017-12-09

## 2017-12-09 RX ADMIN — OXYCODONE HYDROCHLORIDE 10 MG: 5 TABLET ORAL at 09:08

## 2017-12-09 RX ADMIN — POTASSIUM CHLORIDE 10 MEQ: 750 TABLET, EXTENDED RELEASE ORAL at 09:06

## 2017-12-09 RX ADMIN — CEFAZOLIN SODIUM 2 G: 2 INJECTION, SOLUTION INTRAVENOUS at 18:10

## 2017-12-09 RX ADMIN — OXYCODONE HYDROCHLORIDE 10 MG: 5 TABLET ORAL at 18:10

## 2017-12-09 RX ADMIN — GABAPENTIN 1200 MG: 600 TABLET, FILM COATED ORAL at 02:23

## 2017-12-09 RX ADMIN — GABAPENTIN 1200 MG: 300 CAPSULE ORAL at 12:24

## 2017-12-09 RX ADMIN — OXYCODONE HYDROCHLORIDE 5 MG: 5 TABLET ORAL at 02:22

## 2017-12-09 RX ADMIN — HEPARIN SODIUM 5000 UNITS: 5000 INJECTION, SOLUTION INTRAVENOUS; SUBCUTANEOUS at 22:13

## 2017-12-09 RX ADMIN — HEPARIN SODIUM 5000 UNITS: 5000 INJECTION, SOLUTION INTRAVENOUS; SUBCUTANEOUS at 13:35

## 2017-12-09 RX ADMIN — OXYCODONE HYDROCHLORIDE 10 MG: 5 TABLET ORAL at 13:35

## 2017-12-09 RX ADMIN — GABAPENTIN 600 MG: 300 CAPSULE ORAL at 09:06

## 2017-12-09 RX ADMIN — Medication 1 CAPSULE: at 09:07

## 2017-12-09 RX ADMIN — WARFARIN SODIUM 7.5 MG: 7.5 TABLET ORAL at 01:40

## 2017-12-09 RX ADMIN — GABAPENTIN 1200 MG: 600 TABLET, FILM COATED ORAL at 18:10

## 2017-12-09 RX ADMIN — WARFARIN SODIUM 5 MG: 5 TABLET ORAL at 22:13

## 2017-12-09 RX ADMIN — CEFAZOLIN SODIUM 2 G: 2 INJECTION, SOLUTION INTRAVENOUS at 01:38

## 2017-12-09 RX ADMIN — CARVEDILOL 3.12 MG: 3.12 TABLET, FILM COATED ORAL at 09:07

## 2017-12-09 RX ADMIN — CEFAZOLIN SODIUM 2 G: 2 INJECTION, SOLUTION INTRAVENOUS at 09:05

## 2017-12-09 NOTE — BRIEF OP NOTE
[facility] Brief Operative Note    Pre-operative diagnosis: unknown   Post-operative diagnosis * No post-op diagnosis entered *  History of abscess at the left below the knee amputation.   Procedure: Procedure(s):  IRRIGATION AND DEBRIDEMENT OF LEFT BELOW THE KNEE AMPUTATION AND SHORTENING OF THE TIBIA WITH WOUND CLOSURE - Wound Class: II-Clean Contaminated   Surgeon(s): Surgeon(s) and Role:     * Saima Howard MD - Primary     * Iftikhar Santamaria PA-C - Assisting   Estimated blood loss: 50 mL    Specimens: * No specimens in log *   Findings: Closure of wound below the knee amputation

## 2017-12-09 NOTE — PLAN OF CARE
Problem: Patient Care Overview  Goal: Plan of Care/Patient Progress Review  Pt VSS on RA. +numbness & tingling in hands and feet, pt reports baseline. Oxy for pain. Stump dressing c/d/i, elevated on pillows. Writer offered pt to stand, walk, sit in chair and refused. Blanchable redness on buttocks, pt agreed for pillow placement. Large, incont BM x1. Cont BM x1, uses urinal ind. Tele: afib, yelena at times.  Pt agitated throughout shift. D/c postponed due to no room avail at [facility]. SW assisting with additional options for pt, see note.    [id number]: VS and CMS unchanged, yelena HR. pt's mood seemed to improve later in day. Still refused to get out of bed. Redness area on buttocks has resolved. Sm soft, loose BM, continent. Oxy for pain effective.

## 2017-12-09 NOTE — PLAN OF CARE
Problem: Patient Care Overview  Goal: Plan of Care/Patient Progress Review  Outcome: Improving  A&O.  VSS, RA.  CMS intact.  Dressing CDI.  Up with 1 and walker.  IV infusing @ 75 ml/hr.  Pain managed with oxy.  Tele A-fib.  Tolerating diet.  Continue to monitor.

## 2017-12-09 NOTE — PLAN OF CARE
"Problem: Patient Care Overview  Goal: Plan of Care/Patient Progress Review  Discharge Planner OT   Patient plan for discharge: TCU.  Current status: Attempted to work with patient. Educated on plan for today and OT goals. Patient reports he's not interested in working on standing to complete grooming tasks. Reports he feels his R knee is \"unstable\" and thinks he may need a brace. Discussed OT goals for I dressing and toilet transfer and he declines, stating he wants to work on standing, but not for dressing or grooming. Discussed deferring to PT and OT to TCU at this time and patient in agreement.   Barriers to return to prior living situation: Level of A with ADL and mobility.   Recommendations for discharge: TCU.  Rationale for recommendations: Would benefit from continued therapy in TCU setting to maximize I.       Entered by: Mary Mayo 12/09/2017 10:52 AM      Occupational Therapy Discharge Summary    Reason for therapy discharge:    Deferring futher OT to TCU    Progress towards therapy goal(s). See goals on Care Plan in [facility] electronic health record for goal details.  Goals not met.  Barriers to achieving goals:   Patient declines working on stated OT goals here..    Therapy recommendation(s):    Continued therapy is recommended.  Rationale/Recommendations:  To maximize I in ADL/IADL.        "

## 2017-12-09 NOTE — PROGRESS NOTES
[facility]    Hospitalist Progress Note    Assessment & Plan   Antonio Ramirez is a 72 year old male who was admitted on 11/29/2017 for left BKA abscess and found to have MSSA and has wound vac on BKA:    Impression:   Principal Problem:    Abscess of Left BKA -- S/P I&D 12/1/17, MSSA. S/p revision bka stump and closure of wound on 12/8.  - Cont ancef and switch to augmentin at d/c.  - Further plan per ortho.    Chronic issues:    Alcoholic cirrhosis of liver (H)    Chronic kidney disease, stage III (moderate)    CAD, MI in 2007 -- S/P CABG x 3 in 2007    Prostate cancer -- S/P Radiative Seed implants in 2000 (no problems since)    Antiphospholipid Jina Syn, Hypercoag (DVT, PE) -- has IVC filt and Warfarin (currently held)    Thoracic aortic aneurysm without rupture (H)    Chronic congestive heart failure, unspecified congestive heart failure type (H)    Paroxysmal a-fib (H) -- normally on Warfarin (currently held)    Thrombocytopenia -- suspect related to alcohol use, resolved    Anemia -- acute blood loss      Plan:  Continue Antibiotics (Ancef 2 gm IV q8h). Coumadin resumed.    DVT Prophylaxis: Currently on SQ heparin for DVT prophylaxis  (cont while INR is subtherap).  Code Status: Full Code    Disposition: today if TCU bed is available. 10 days of augmentin ordered.      Interval History   Pain is fairly controlled.    Physical Exam   Temp: 97.9  F (36.6  C) Temp src: Oral BP: 118/68 Pulse: 61 Heart Rate: 68 Resp: 16 SpO2: 96 % O2 Device: None (Room air) Oxygen Delivery: 3 LPM  Vitals:    11/29/17 1629 12/06/17 0600 12/08/17 0736   Weight: 99.8 kg (220 lb) 105.4 kg (232 lb 5.8 oz) 105.8 kg (233 lb 4 oz)     Vital Signs with Ranges  Temp:  [97  F (36.1  C)-98.3  F (36.8  C)] 97.9  F (36.6  C)  Pulse:  [55-61] 61  Heart Rate:  [53-68] 68  Resp:  [9-21] 16  BP: ()/(53-89) 118/68  SpO2:  [95 %-100 %] 96 %  I/O last 3 completed shifts:  In: 1309 [P.O.:360; I.V.:949]  Out: 650 [Urine:600;  Blood:50]    Constitutional: Awake, alert, cooperative, no apparent distress  Respiratory: Clear to auscultation bilaterally, no crackles or wheezing  Cardiovascular: Regular rate and rhythm, normal S1 and S2, and 2/6 systolic murmur left sternal border  GI: Normal bowel sounds, soft, non-distended, non-tender  Extrem: left BKA stump with wound vac -- but redness and swelling much improved compared to admission.   Neuro: Ox3, decreased light touch to mid shin with right leg and distal BKA on left leg.     Medications     lactated ringers 75 mL/hr at 12/08/17 2139       sodium chloride (PF)  3 mL Intracatheter Q8H     heparin  5,000 Units Subcutaneous Q8H     acetaminophen  975 mg Oral Q8H     senna-docusate  2 tablet Oral BID     lactobacillus rhamnosus (GG)  1 capsule Oral Daily     sodium phosphate  1 enema Rectal Once     docusate sodium  200 mg Oral BID     gabapentin  1,200 mg Oral BID     gabapentin (NEURONTIN) tablet 2,400 mg  2,400 mg Oral Q24H     ceFAZolin  2 g Intravenous Q8H     potassium chloride SA  10 mEq Oral Daily     carvedilol (COREG) tablet 3.125 mg  3.125 mg Oral BID w/meals       Data     Recent Labs  Lab 12/09/17  0847 12/08/17  0750 12/07/17  0813  12/05/17  0750  12/04/17  0806 12/04/17  0550   WBC  --  7.4  --   --  7.7  --  6.4 5.6   HGB 9.3* 9.2* 8.6*  --  9.5*  < > 8.0* 8.0*   MCV  --  96  --   --  91  --  95 95    184 164  < > 142*  --  156 127*   INR 1.16* 1.25*  --   --   --   --  1.65*  --    NA  --  137  --   --  137  --   --  138   POTASSIUM  --  4.2  --   --  4.2  --   --  4.2   CHLORIDE  --  107  --   --  106  --   --  105   CO2  --  25  --   --  23  --   --  26   BUN  --  10  --   --  15  --   --  18   CR  --  1.19  --   --  1.08  --   --  1.43*   ANIONGAP  --  5  --   --  8  --   --  7   BENNIE  --  8.6  --   --  8.2*  --   --  7.5*   GLC  --  84  --   --  121*  --   --  104*   < > = values in this interval not displayed.    Imaging:   No results found for this or any  previous visit (from the past 24 hour(s)).

## 2017-12-09 NOTE — OP NOTE
DATE OF PROCEDURE:  12/08/2017       PREOPERATIVE DIAGNOSIS:  Subcutaneous abscess, left below-knee amputation.      POSTOPERATIVE DIAGNOSIS:  Subcutaneous abscess, left below-knee amputation.      PROCEDURE:  Removal of wound VAC, I&D, shortening of the tibia and revision of the below-knee amputation stump.      INDICATIONS FOR PROCEDURE:  Antonio Ramirez is a 72-year-old male who approximately a week ago was admitted for subcutaneous abscess at the level of his below-knee amputation stump.  He was brought to the operating room, had an I&D followed by multiple subsequent I&D's and placement of wound VAC.  His infection is resolving nicely.  His last cultures, which were 2 days ago, were sterile.  He is brought to the operating room for the above-stated procedure.  I explained the risks, benefits, potential complications of the procedure to the patient and his family.  They want to proceed.      ANESTHESIA:  General.      DESCRIPTION OF PROCEDURE:  The patient was taken to the operating room and placed on the operating table in the supine position.  After adequate induction of a general anesthetic, a bump was placed underneath the left hip.  Pneumatic tourniquet was applied to the left thigh.  The patient was given 2 grams Ancef for infection prophylaxis, given 1 hour prior to incision.  We then performed a sterile prep and drape of the left lower extremity.  After sterile prep and drape, the wound VAC was removed without difficulty.  There was good granulation tissue.  We irrigated using a liter of antibiotic saline.  We then shortened the tibia by approximately 2 cm.  This allowed for a nice closure.  Prior to closure, we inflated the tourniquet for approximately 5 minutes.  Hemostasis was obtained using electrocautery.  We then let the tourniquet down and closed  the subq with 0 Vicryl.  The skin was closed with a running 4-0 nylon suture in whip-type stitch, and this was reinforced with staples.  We did place a  Penrose drain deep.  We then applied a sterile compressive dressing.        The patient tolerated the operative procedure.  There were no intraoperative complications.  The patient was sent to [facility] in stable condition.        Plan will be for the patient and to be sent home on oral antibiotics.  We will see him back in my clinic next Monday or Tuesday for dressing change.         AUDRA SMITH MD             D: 2017 19:20   T: 2017 15:37   MT: [id number]      Name:     GALILEA LUGO   MRN:      [id number]        Account:        [id number]   :      1945           Procedure Date: 2017      Document: [id number]

## 2017-12-09 NOTE — PROGRESS NOTES
"Antonio Ramirez  2017  POD # 1 sp revision bka stump and closure of wound.    Admit Date:  2017      Doing well.  Normal healing wound.  No excessive bleeding  Pain well-controlled.  Objective:  Blood pressure 109/60, pulse 55, temperature 98.3  F (36.8  C), temperature source Oral, resp. rate 16, height 1.854 m (6' 1\"), weight 105.8 kg (233 lb 4 oz), SpO2 97 %.    Temperatures:  Current - Temp: 98.3  F (36.8  C); Max - Temp  Av.7  F (36.5  C)  Min: 97  F (36.1  C)  Max: 98.3  F (36.8  C)  Pulse range: Pulse  Av  Min: 55  Max: 55  Blood pressure range: Systolic (24hrs), Av , Min:99 , Max:147   ; Diastolic (24hrs), Av, Min:53, Max:89    Exam:  CMS: intact  alert, stable, wound ok  Pt resting comfortably      Labs:  Recent Labs   Lab Test  17   0750  17   0750  17   0550   POTASSIUM  4.2  4.2  4.2     Recent Labs   Lab Test  17   0750  17   0813  17   0750   HGB  9.2*  8.6*  9.5*     Recent Labs   Lab Test  17   0750  17   0806  17   1516   INR  1.25*  1.65*  1.65*     Recent Labs   Lab Test  17   0750  17   0813  17   1455   PLT  184  164  151       PLAN: Non weight bearing  Pain control measures  Additional problems to be followed by medicine physician  Pt ready for DC from ortho stand point. He should return to my clinic next week for dressing change. [phone].      "

## 2017-12-09 NOTE — DISCHARGE SUMMARY
"Discharge Summary    Antonio Ramirez MRN# [id number]   YOB: 1945 Age: 72 year old     Date of Admission:  11/29/2017  Date of Discharge:  12/9/2017  Admitting Physician:  Saima Howard MD  Discharge Physician:  Parth Farias MD Pager [phone] Office [phone]  Discharging Service:  [facility] Hospitalist Service     Primary Provider: Main Hardy          Discharge Diagnosis:   See problem-oriented hospital course for diagnoses addressed during this hospital stay.             Discharge Disposition:   Discharged to nursing home          Condition on Discharge:   Discharge condition: Stable   Discharge vitals: Blood pressure 118/68, pulse 61, temperature 97.9  F (36.6  C), temperature source Oral, resp. rate 16, height 1.854 m (6' 1\"), weight 105.8 kg (233 lb 4 oz), SpO2 96 %.   Code status on discharge: Full Code          Discharge Medications:     General info for SNF   Length of Stay Estimate: Short Term Care: Estimated # of Days 31-90  Condition at Discharge: Improving  Level of care:skilled   Rehabilitation Potential: Fair  Admission H&P remains valid and up-to-date: Yes  Recent Chemotherapy: N/A  Use Nursing Home Standing Orders: Yes     Reason for your hospital stay   Knee abscess     Activity - Up with nursing assistance     Activity - Up with assistive device     Follow Up and recommended labs and tests   Follow up with long-term physician.  The following labs/tests are recommended: INR  And BMP on monday.  F/u with ortho clinic next Monday for dressing changes.     Physical Therapy Adult Consult   Evaluate and treat as clinically indicated.    Reason: post surgery     Occupational Therapy Adult Consult   Evaluate and treat as clinically indicated.    Reason: post surgery     Fall precautions     Advance Diet as Tolerated   Follow this diet upon discharge: Orders Placed This Encounter     Advance Diet as Tolerated: Regular Diet Adult          Consultations:   Ortho  ID        Brief " hx and History and Hospital Course at Presentation:   Antonio Ramirez is a 72 year old male who was admitted on 11/29/2017 for left BKA abscess and found to have MSSA and has wound vac on BKA:      Abscess of Left BKA -- S/P I&D 12/1/17, MSSA. S/p revision bka stump and closure of wound on 12/8. Has been on ancef. Switched to augmentin at d/c. Pain is controlled. Afebrile. Followed by ID and ortho.  - 10 days of augmentin.  - Follow up with ortho next week for dressing change.  - Pain meds (with bowel regimen) ordered.        Pt is being discharged to TCU in stable conditions.          DISCHARGE EXAM:   Temp:  [97  F (36.1  C)-98.3  F (36.8  C)] 97.9  F (36.6  C)  Pulse:  [55-61] 61  Heart Rate:  [53-68] 68  Resp:  [9-21] 16  BP: ()/(53-89) 118/68  SpO2:  [95 %-100 %] 96 %  Wt Readings from Last 5 Encounters:   12/08/17 105.8 kg (233 lb 4 oz)   09/25/17 102.1 kg (225 lb)   08/31/17 106.6 kg (235 lb)   06/30/17 115.2 kg (254 lb)   06/27/17 111.6 kg (246 lb)            Pertinent Tests and Procedures:   S/p KKA stump revision             Pending Results:   none          Discharge Instructions and Follow-Up:   Discharge diet:   Active Diet Order      Advance Diet as Tolerated: Regular Diet Adult      Advance Diet as Tolerated   Discharge activity: Activity as tolerated   Discharge follow-up: Follow up with NH provider  Follow up with ortho next week   Outpatient therapy: None    Home Care agency: None    Supplies and equipment: None   Lines and drains: None    Wound care: None   Other instructions: None      More than 30 minutes were required for coordination of care for this discharge.          Procedures / Labs / Imaging:     Results for orders placed or performed during the hospital encounter of 11/29/17   US Extremity Non Vascular Left    Narrative    US EXTREMITY NON VASCULAR LEFT  11/29/2017 5:26 PM     HISTORY: Evaluate abscess and stump site.     COMPARISON: None.    FINDINGS: Edema throughout the distal  left lower extremity stump in  the remaining tissues about the amputated left foot. There is a 6.6 x  3.1 x 1.6 cm complex fluid collection anteriorly. This could be a  seroma or abscess. There is considerable increased vascularity within  the edematous tissues.      Impression    IMPRESSION:   1. Edematous hyperemic left lower extremity stump tissues.  2. Complex fluid collection as described. Abscess cannot be excluded.    MALKA DE MD   CBC with platelets differential   Result Value Ref Range    WBC 11.0 4.0 - 11.0 10e9/L    RBC Count 4.28 (L) 4.4 - 5.9 10e12/L    Hemoglobin 14.0 13.3 - 17.7 g/dL    Hematocrit 40.5 40.0 - 53.0 %    MCV 95 78 - 100 fl    MCH 32.7 26.5 - 33.0 pg    MCHC 34.6 31.5 - 36.5 g/dL    RDW 14.8 10.0 - 15.0 %    Platelet Count 125 (L) 150 - 450 10e9/L    Diff Method Automated Method    INR   Result Value Ref Range    INR Canceled, Test credited 0.86 - 1.14   Basic metabolic panel   Result Value Ref Range    Sodium 131 (L) 133 - 144 mmol/L    Potassium 3.9 3.4 - 5.3 mmol/L    Chloride 96 94 - 109 mmol/L    Carbon Dioxide 26 20 - 32 mmol/L    Anion Gap 9 3 - 14 mmol/L    Glucose 105 (H) 70 - 99 mg/dL    Urea Nitrogen 23 7 - 30 mg/dL    Creatinine 1.23 0.66 - 1.25 mg/dL    GFR Estimate 57 (L) >60 mL/min/1.7m2    GFR Estimate If Black 69 >60 mL/min/1.7m2    Calcium 8.4 (L) 8.5 - 10.1 mg/dL   INR   Result Value Ref Range    INR 2.53 (H) 0.86 - 1.14   INR   Result Value Ref Range    INR 2.56 (H) 0.86 - 1.14   CBC with platelets   Result Value Ref Range    WBC 7.8 4.0 - 11.0 10e9/L    RBC Count 3.80 (L) 4.4 - 5.9 10e12/L    Hemoglobin 12.5 (L) 13.3 - 17.7 g/dL    Hematocrit 35.6 (L) 40.0 - 53.0 %    MCV 94 78 - 100 fl    MCH 32.9 26.5 - 33.0 pg    MCHC 35.1 31.5 - 36.5 g/dL    RDW 14.5 10.0 - 15.0 %    Platelet Count 106 (L) 150 - 450 10e9/L   AST   Result Value Ref Range    AST 18 0 - 45 U/L   Basic metabolic panel   Result Value Ref Range    Sodium 134 133 - 144 mmol/L    Potassium 3.1  (L) 3.4 - 5.3 mmol/L    Chloride 99 94 - 109 mmol/L    Carbon Dioxide 26 20 - 32 mmol/L    Anion Gap 9 3 - 14 mmol/L    Glucose 95 70 - 99 mg/dL    Urea Nitrogen 25 7 - 30 mg/dL    Creatinine 1.27 (H) 0.66 - 1.25 mg/dL    GFR Estimate 55 (L) >60 mL/min/1.7m2    GFR Estimate If Black 67 >60 mL/min/1.7m2    Calcium 8.3 (L) 8.5 - 10.1 mg/dL   Prealbumin   Result Value Ref Range    Prealbumin 4 (L) 15 - 45 mg/dL   Factor 10 chromogenic   Result Value Ref Range    Chromogenic Factor 10 31 (L) 70 - 130 %   Potassium   Result Value Ref Range    Potassium 3.7 3.4 - 5.3 mmol/L   Basic metabolic panel   Result Value Ref Range    Sodium 133 133 - 144 mmol/L    Potassium 3.7 3.4 - 5.3 mmol/L    Chloride 99 94 - 109 mmol/L    Carbon Dioxide 26 20 - 32 mmol/L    Anion Gap 8 3 - 14 mmol/L    Glucose 93 70 - 99 mg/dL    Urea Nitrogen 26 7 - 30 mg/dL    Creatinine 1.43 (H) 0.66 - 1.25 mg/dL    GFR Estimate 48 (L) >60 mL/min/1.7m2    GFR Estimate If Black 58 (L) >60 mL/min/1.7m2    Calcium 8.3 (L) 8.5 - 10.1 mg/dL   CBC with platelets   Result Value Ref Range    WBC 8.3 4.0 - 11.0 10e9/L    RBC Count 3.75 (L) 4.4 - 5.9 10e12/L    Hemoglobin 12.3 (L) 13.3 - 17.7 g/dL    Hematocrit 35.4 (L) 40.0 - 53.0 %    MCV 94 78 - 100 fl    MCH 32.8 26.5 - 33.0 pg    MCHC 34.7 31.5 - 36.5 g/dL    RDW 14.5 10.0 - 15.0 %    Platelet Count 116 (L) 150 - 450 10e9/L   INR   Result Value Ref Range    INR 1.91 (H) 0.86 - 1.14   Factor 10 chromogenic   Result Value Ref Range    Chromogenic Factor 10 40 (L) 70 - 130 %   INR   Result Value Ref Range    INR 1.65 (H) 0.86 - 1.14   Creatinine   Result Value Ref Range    Creatinine 1.48 (H) 0.66 - 1.25 mg/dL    GFR Estimate 46 (L) >60 mL/min/1.7m2    GFR Estimate If Black 56 (L) >60 mL/min/1.7m2   Hemoglobin   Result Value Ref Range    Hemoglobin 11.1 (L) 13.3 - 17.7 g/dL   Factor 10 chromogenic   Result Value Ref Range    Chromogenic Factor 10 45 (L) 70 - 130 %   Heparin 10a Level   Result Value Ref Range     Heparin 10A Level 0.38 IU/mL   Glucose   Result Value Ref Range    Glucose 132 (H) 70 - 99 mg/dL   Heparin 10a Level   Result Value Ref Range    Heparin 10A Level 0.46 IU/mL   Basic metabolic panel   Result Value Ref Range    Sodium 133 133 - 144 mmol/L    Potassium 4.2 3.4 - 5.3 mmol/L    Chloride 98 94 - 109 mmol/L    Carbon Dioxide 29 20 - 32 mmol/L    Anion Gap 6 3 - 14 mmol/L    Glucose 90 70 - 99 mg/dL    Urea Nitrogen 23 7 - 30 mg/dL    Creatinine 1.58 (H) 0.66 - 1.25 mg/dL    GFR Estimate 43 (L) >60 mL/min/1.7m2    GFR Estimate If Black 52 (L) >60 mL/min/1.7m2    Calcium 8.5 8.5 - 10.1 mg/dL   CBC with platelets   Result Value Ref Range    WBC 6.5 4.0 - 11.0 10e9/L    RBC Count 3.32 (L) 4.4 - 5.9 10e12/L    Hemoglobin 10.7 (L) 13.3 - 17.7 g/dL    Hematocrit 31.9 (L) 40.0 - 53.0 %    MCV 96 78 - 100 fl    MCH 32.2 26.5 - 33.0 pg    MCHC 33.5 31.5 - 36.5 g/dL    RDW 14.4 10.0 - 15.0 %    Platelet Count 121 (L) 150 - 450 10e9/L   Heparin 10a Level   Result Value Ref Range    Heparin 10A Level 0.35 IU/mL   Hemoglobin   Result Value Ref Range    Hemoglobin 8.9 (L) 13.3 - 17.7 g/dL   Hemoglobin   Result Value Ref Range    Hemoglobin 7.0 (L) 13.3 - 17.7 g/dL   Basic metabolic panel   Result Value Ref Range    Sodium 138 133 - 144 mmol/L    Potassium 4.2 3.4 - 5.3 mmol/L    Chloride 105 94 - 109 mmol/L    Carbon Dioxide 26 20 - 32 mmol/L    Anion Gap 7 3 - 14 mmol/L    Glucose 104 (H) 70 - 99 mg/dL    Urea Nitrogen 18 7 - 30 mg/dL    Creatinine 1.43 (H) 0.66 - 1.25 mg/dL    GFR Estimate 48 (L) >60 mL/min/1.7m2    GFR Estimate If Black 58 (L) >60 mL/min/1.7m2    Calcium 7.5 (L) 8.5 - 10.1 mg/dL   CBC with platelets   Result Value Ref Range    WBC 5.6 4.0 - 11.0 10e9/L    RBC Count 2.47 (L) 4.4 - 5.9 10e12/L    Hemoglobin 8.0 (L) 13.3 - 17.7 g/dL    Hematocrit 23.4 (L) 40.0 - 53.0 %    MCV 95 78 - 100 fl    MCH 32.4 26.5 - 33.0 pg    MCHC 34.2 31.5 - 36.5 g/dL    RDW 14.2 10.0 - 15.0 %    Platelet Count 127 (L) 150 -  450 10e9/L   Hemoglobin   Result Value Ref Range    Hemoglobin 9.5 (L) 13.3 - 17.7 g/dL   Hemoglobin   Result Value Ref Range    Hemoglobin 10.2 (L) 13.3 - 17.7 g/dL   CBC with platelets   Result Value Ref Range    WBC 6.4 4.0 - 11.0 10e9/L    RBC Count 2.43 (L) 4.4 - 5.9 10e12/L    Hemoglobin 8.0 (L) 13.3 - 17.7 g/dL    Hematocrit 23.0 (L) 40.0 - 53.0 %    MCV 95 78 - 100 fl    MCH 32.9 26.5 - 33.0 pg    MCHC 34.8 31.5 - 36.5 g/dL    RDW 14.5 10.0 - 15.0 %    Platelet Count 156 150 - 450 10e9/L   Fibrinogen activity   Result Value Ref Range    Fibrinogen 278 200 - 420 mg/dL   INR   Result Value Ref Range    INR 1.65 (H) 0.86 - 1.14   Partial thromboplastin time   Result Value Ref Range    PTT 37 22 - 37 sec   Hemoglobin   Result Value Ref Range    Hemoglobin 10.2 (L) 13.3 - 17.7 g/dL   Heparin 10a Level   Result Value Ref Range    Heparin 10A Level 0.14 IU/mL   CBC with platelets   Result Value Ref Range    WBC 7.7 4.0 - 11.0 10e9/L    RBC Count 3.07 (L) 4.4 - 5.9 10e12/L    Hemoglobin 9.5 (L) 13.3 - 17.7 g/dL    Hematocrit 27.9 (L) 40.0 - 53.0 %    MCV 91 78 - 100 fl    MCH 30.9 26.5 - 33.0 pg    MCHC 34.1 31.5 - 36.5 g/dL    RDW 15.4 (H) 10.0 - 15.0 %    Platelet Count 142 (L) 150 - 450 10e9/L   Basic metabolic panel   Result Value Ref Range    Sodium 137 133 - 144 mmol/L    Potassium 4.2 3.4 - 5.3 mmol/L    Chloride 106 94 - 109 mmol/L    Carbon Dioxide 23 20 - 32 mmol/L    Anion Gap 8 3 - 14 mmol/L    Glucose 121 (H) 70 - 99 mg/dL    Urea Nitrogen 15 7 - 30 mg/dL    Creatinine 1.08 0.66 - 1.25 mg/dL    GFR Estimate 67 >60 mL/min/1.7m2    GFR Estimate If Black 81 >60 mL/min/1.7m2    Calcium 8.2 (L) 8.5 - 10.1 mg/dL   Heparin 10a Level   Result Value Ref Range    Heparin 10A Level 0.35 IU/mL   Heparin Xa (10a) Level   Result Value Ref Range    Heparin 10A Level <0.10 IU/mL     *Note: Due to a large number of results and/or encounters for the requested time period, some results have not been displayed. A complete  set of results can be found in Results Review.

## 2017-12-09 NOTE — PLAN OF CARE
Problem: Patient Care Overview  Goal: Plan of Care/Patient Progress Review  Outcome: Improving  VSS ex irregular apical HR, tele for afib, LS clear, BS hypoactive, +Flatus.  Audible murmur detected.  Pain of 7-8 controlled with IV dilaudid.  L elbow and R foot wounds CDI and THOMAS. Pedal pulses +2 palpable.  Patient transfers with assist of +1, uses walker.  Clear liquid diet tolerated well, advance as tolerated.

## 2017-12-09 NOTE — ANESTHESIA POSTPROCEDURE EVALUATION
Patient: Antonio Ramirez    Procedure(s):  IRRIGATION AND DEBRIDEMENT OF LEFT BELOW THE KNEE AMPUTATION AND SHORTENING OF THE TIBIA WITH WOUND CLOSURE - Wound Class: II-Clean Contaminated    Diagnosis:unknown  Diagnosis Additional Information: No value filed.    Anesthesia Type:  General, LMA    Note:  Anesthesia Post Evaluation    Patient location during evaluation: PACU  Patient participation: Able to fully participate in evaluation  Level of consciousness: awake  Pain management: adequate  Airway patency: patent  Cardiovascular status: acceptable  Respiratory status: acceptable  Hydration status: acceptable  PONV: none     Anesthetic complications: None          Last vitals:  Vitals:    12/08/17 2148 12/08/17 2200 12/08/17 2228   BP:  134/76 130/72   Pulse:      Resp: 18 16 16   Temp:  36.8  C (98.2  F)    SpO2:            Electronically Signed By: WILMA ELIZABETH MD  December 8, 2017  11:17 PM

## 2017-12-09 NOTE — ANESTHESIA CARE TRANSFER NOTE
Patient: Antonio Ramirez    Procedure(s):  IRRIGATION AND DEBRIDEMENT OF LEFT BELOW THE KNEE AMPUTATION AND SHORTENING OF THE TIBIA WITH WOUND CLOSURE - Wound Class: II-Clean Contaminated    Diagnosis: unknown  Diagnosis Additional Information: No value filed.    Anesthesia Type:   General, LMA     Note:  Airway :Face Mask  Patient transferred to:PACU  Comments: VSS on arrival to PACU. Alert and talking, on 10L SFM 02. Report given to RN before transfer of patient care.Handoff Report: Identifed the Patient, Identified the Reponsible Provider, Reviewed the pertinent medical history, Discussed the surgical course, Reviewed Intra-OP anesthesia mangement and issues during anesthesia, Set expectations for post-procedure period and Allowed opportunity for questions and acknowledgement of understanding      Vitals: (Last set prior to Anesthesia Care Transfer)    CRNA VITALS  12/8/2017 1845 - 12/8/2017 1924      12/8/2017             Pulse: 86    SpO2: 100 %    Resp Rate (set): 10                Electronically Signed By: DIPAK Marei CRNA  December 8, 2017  7:24 PM

## 2017-12-09 NOTE — PROGRESS NOTES
[facility]    Hospitalist Progress Note    Assessment & Plan   Antonio Ramirez is a 72 year old male who was admitted on 11/29/2017 for left BKA abscess and found to have MSSA and has wound vac on BKA:    Impression:   Principal Problem:    Abscess of Left BKA -- S/P I&D 12/1/17, MSSA  Active Problems:    Alcoholic cirrhosis of liver (H)    Chronic kidney disease, stage III (moderate)    CAD, MI in 2007 -- S/P CABG x 3 in 2007    Prostate cancer -- S/P Radiative Seed implants in 2000 (no problems since)    Antiphospholipid Jina Syn, Hypercoag (DVT, PE) -- has IVC filt and Warfarin (currently held)    Thoracic aortic aneurysm without rupture (H)    Chronic congestive heart failure, unspecified congestive heart failure type (H)    Paroxysmal a-fib (H) -- normally on Warfarin (currently held)    Thrombocytopenia -- suspect related to alcohol use, resolved    Anemia -- acute blood loss      Plan:  Continue Antibiotics (Ancef 2 gm IV q8h).  BKA revision today, then resume warfarin 7.5 mg this PM and SQ heparin tomorrow.     DVT Prophylaxis: Currently on SQ heparin for DVT prophylaxis    Code Status: Full Code    Disposition: Expected discharge in ?1-2 days once Left BKA infection stable (infection improving and no problems with bleeding).  Might need TCU for wound care depending on wound care needs at discharge.    Davie Osborn MD  Pager [phone]  Cell Phone [phone]  Text Page (7am to 6pm)    Interval History   Overall feels fine, bowels moved yesterday.  Wound was clean when Wound vac taken off, no further bleeding reported.    Physical Exam   Temp: 98.2  F (36.8  C) Temp src: Oral BP: 130/72 Pulse: 55 Heart Rate: 64 Resp: 16 SpO2: 100 % O2 Device: Nasal cannula Oxygen Delivery: 3 LPM  Vitals:    11/29/17 1629 12/06/17 0600 12/08/17 0736   Weight: 99.8 kg (220 lb) 105.4 kg (232 lb 5.8 oz) 105.8 kg (233 lb 4 oz)     Vital Signs with Ranges  Temp:  [97  F (36.1  C)-98.2  F (36.8  C)]  98.2  F (36.8  C)  Pulse:  [55] 55  Heart Rate:  [55-67] 64  Resp:  [9-21] 16  BP: (104-147)/(53-89) 130/72  SpO2:  [94 %-100 %] 100 %  I/O last 3 completed shifts:  In: 3280 [P.O.:360; I.V.:2920]  Out: 350 [Urine:300; Blood:50]    Constitutional: Awake, alert, cooperative, no apparent distress  Respiratory: Clear to auscultation bilaterally, no crackles or wheezing  Cardiovascular: Regular rate and rhythm, normal S1 and S2, and 2/6 systolic murmur left sternal border  GI: Normal bowel sounds, soft, non-distended, non-tender  Extrem: left BKA stump with wound vac -- but redness and swelling much improved compared to admission.   Neuro: Ox3, decreased light touch to mid shin with right leg and distal BKA on left leg.     Medications     lactated ringers 75 mL/hr at 12/08/17 2139       sodium chloride (PF)  3 mL Intracatheter Q8H     [START ON 12/9/2017] heparin  5,000 Units Subcutaneous Q8H     acetaminophen  975 mg Oral Q8H     senna-docusate  2 tablet Oral BID     lactobacillus rhamnosus (GG)  1 capsule Oral Daily     sodium phosphate  1 enema Rectal Once     docusate sodium  200 mg Oral BID     gabapentin  1,200 mg Oral BID     gabapentin (NEURONTIN) tablet 2,400 mg  2,400 mg Oral Q24H     ceFAZolin  2 g Intravenous Q8H     potassium chloride SA  10 mEq Oral Daily     carvedilol (COREG) tablet 3.125 mg  3.125 mg Oral BID w/meals       Data     Recent Labs  Lab 12/08/17  0750 12/07/17  0813 12/06/17  1455 12/05/17  0750  12/04/17  0806 12/04/17  0550   WBC 7.4  --   --  7.7  --  6.4 5.6   HGB 9.2* 8.6*  --  9.5*  < > 8.0* 8.0*   MCV 96  --   --  91  --  95 95    164 151 142*  --  156 127*   INR 1.25*  --   --   --   --  1.65*  --      --   --  137  --   --  138   POTASSIUM 4.2  --   --  4.2  --   --  4.2   CHLORIDE 107  --   --  106  --   --  105   CO2 25  --   --  23  --   --  26   BUN 10  --   --  15  --   --  18   CR 1.19  --   --  1.08  --   --  1.43*   ANIONGAP 5  --   --  8  --   --  7   BENNIE 8.6   --   --  8.2*  --   --  7.5*   GLC 84  --   --  121*  --   --  104*   < > = values in this interval not displayed.    Imaging:   No results found for this or any previous visit (from the past 24 hour(s)).

## 2017-12-09 NOTE — PROGRESS NOTES
SW:  D: Spoke with patient regarding facilities that he would like to discharge to.  Patient chose [facility] and [facility].   None of those facilities had any availability for today for admissions.  SW will continue to search for availability for discharge.     CRISTOBAL Kemp, ANGELICSW

## 2017-12-09 NOTE — PLAN OF CARE
"Problem: Patient Care Overview  Goal: Plan of Care/Patient Progress Review  PT: Pt attempted, discharge summary in present in chart for today, however pt is pending placement found by SW, no note in yet from SW at this time. Pt declines reporting, \"I just got comfortable and am hoping to leave later\". SW present to talk with pt on PT exit. Session canceled, rescheduled for tomorrow if pt remains to continue POC.      "

## 2017-12-10 ENCOUNTER — TELEPHONE (OUTPATIENT)
Dept: GERIATRICS | Facility: CLINIC | Age: 72
End: 2017-12-10

## 2017-12-10 VITALS
DIASTOLIC BLOOD PRESSURE: 60 MMHG | TEMPERATURE: 98.3 F | WEIGHT: 233.25 LBS | BODY MASS INDEX: 30.91 KG/M2 | HEIGHT: 73 IN | OXYGEN SATURATION: 96 % | SYSTOLIC BLOOD PRESSURE: 110 MMHG | RESPIRATION RATE: 16 BRPM | HEART RATE: 64 BPM

## 2017-12-10 LAB — INR PPP: 1.39 (ref 0.86–1.14)

## 2017-12-10 PROCEDURE — 25000132 ZZH RX MED GY IP 250 OP 250 PS 637: Mod: GY | Performed by: PHYSICIAN ASSISTANT

## 2017-12-10 PROCEDURE — 36415 COLL VENOUS BLD VENIPUNCTURE: CPT | Performed by: INTERNAL MEDICINE

## 2017-12-10 PROCEDURE — 99207 ZZC NON-BILLABLE SERV PER CHARTING: CPT | Performed by: INTERNAL MEDICINE

## 2017-12-10 PROCEDURE — A9270 NON-COVERED ITEM OR SERVICE: HCPCS | Mod: GY | Performed by: ORTHOPAEDIC SURGERY

## 2017-12-10 PROCEDURE — A9270 NON-COVERED ITEM OR SERVICE: HCPCS | Mod: GY | Performed by: INTERNAL MEDICINE

## 2017-12-10 PROCEDURE — 25000128 H RX IP 250 OP 636: Performed by: INTERNAL MEDICINE

## 2017-12-10 PROCEDURE — 25000132 ZZH RX MED GY IP 250 OP 250 PS 637: Mod: GY | Performed by: ORTHOPAEDIC SURGERY

## 2017-12-10 PROCEDURE — 25000132 ZZH RX MED GY IP 250 OP 250 PS 637: Mod: GY | Performed by: INTERNAL MEDICINE

## 2017-12-10 PROCEDURE — A9270 NON-COVERED ITEM OR SERVICE: HCPCS | Mod: GY | Performed by: PHYSICIAN ASSISTANT

## 2017-12-10 PROCEDURE — 85610 PROTHROMBIN TIME: CPT | Performed by: INTERNAL MEDICINE

## 2017-12-10 PROCEDURE — 25000128 H RX IP 250 OP 636: Performed by: ORTHOPAEDIC SURGERY

## 2017-12-10 RX ORDER — WARFARIN SODIUM 1 MG/1
5 TABLET ORAL DAILY
Qty: 30 TABLET | Refills: 0 | DISCHARGE
Start: 2017-12-10 | End: 2017-12-15

## 2017-12-10 RX ADMIN — CARVEDILOL 3.12 MG: 3.12 TABLET, FILM COATED ORAL at 09:36

## 2017-12-10 RX ADMIN — OXYCODONE HYDROCHLORIDE 10 MG: 5 TABLET ORAL at 01:40

## 2017-12-10 RX ADMIN — CEFAZOLIN SODIUM 2 G: 2 INJECTION, SOLUTION INTRAVENOUS at 01:38

## 2017-12-10 RX ADMIN — GABAPENTIN 1200 MG: 300 CAPSULE ORAL at 08:06

## 2017-12-10 RX ADMIN — OXYCODONE HYDROCHLORIDE 10 MG: 5 TABLET ORAL at 10:47

## 2017-12-10 RX ADMIN — HEPARIN SODIUM 5000 UNITS: 5000 INJECTION, SOLUTION INTRAVENOUS; SUBCUTANEOUS at 05:58

## 2017-12-10 RX ADMIN — Medication 1 CAPSULE: at 08:06

## 2017-12-10 RX ADMIN — POTASSIUM CHLORIDE 10 MEQ: 750 TABLET, EXTENDED RELEASE ORAL at 08:06

## 2017-12-10 NOTE — PLAN OF CARE
Problem: Patient Care Overview  Goal: Plan of Care/Patient Progress Review  Outcome: Improving  Left stump dressing CDI. AVSS, afib with cvr. Oxycodone for pain. DC planning to TCU.

## 2017-12-10 NOTE — PROGRESS NOTES
[facility]    Hospitalist Progress Note    Date of Service (when I saw the patient): 12/10/2017    Assessment & Plan   Antonio Ramirez is a 72 year old male who was admitted on 11/29/2017 for left BKA abscess and found to have MSSA and has wound vac on BKA:    Abscess of Left BKA S/P I&D 12/1/17  MSSA s/p revision bka stump and closure of wound on 12/8.  - c/w augmentin at d/c.  - Further plan per ortho.    Antiphospholipid Jina Syn, Hypercoag (DVT, PE)  - c/w Warfarin nightly, have INR checked 12/11 for further dosing  - INR 1.39 12/10    All other medical issues stable. Discharge 12/9 delayed due to no beds at TCU and concern for repatha cost. Patient states he pays full cost out of pocket, managed via [employer]. Discharge today morning to TCU.    DVT Prophylaxis: Warfarin and Pneumatic Compression Devices  Code Status: Full Code    Disposition: Expected discharge 12/10.    Rojelio Flores MD    Interval History   No new complaints. Wants to leave before football game today. Denies new symptoms since yesterday.    -Data reviewed today: I reviewed all new labs and imaging results over the last 24 hours. I personally reviewed no images or EKG's today.    Physical Exam   Temp: 98.3  F (36.8  C) Temp src: Oral BP: 110/60 Pulse: 64 Heart Rate: 64 Resp: 16 SpO2: 96 % O2 Device: None (Room air)    Vitals:    11/29/17 1629 12/06/17 0600 12/08/17 0736   Weight: 99.8 kg (220 lb) 105.4 kg (232 lb 5.8 oz) 105.8 kg (233 lb 4 oz)     Vital Signs with Ranges  Temp:  [98.3  F (36.8  C)-99.3  F (37.4  C)] 98.3  F (36.8  C)  Pulse:  [52-64] 64  Heart Rate:  [58-64] 64  Resp:  [16] 16  BP: ()/(51-60) 110/60  SpO2:  [96 %-99 %] 96 %  I/O last 3 completed shifts:  In: 1671 [P.O.:690; I.V.:981]  Out: 450 [Urine:450]    Constitutional: Awake, alert, cooperative, no apparent distress  Respiratory: Clear to auscultation bilaterally, no crackles or wheezing  Cardiovascular: Regular rate and rhythm, normal S1  and S2, and no murmur noted  GI: Normal bowel sounds, soft, non-distended, non-tender  Skin/Integumen: No rashes, no cyanosis, left BKA present    Medications     lactated ringers 75 mL/hr at 12/08/17 2139       sodium chloride (PF)  3 mL Intracatheter Q8H     heparin  5,000 Units Subcutaneous Q8H     acetaminophen  975 mg Oral Q8H     senna-docusate  2 tablet Oral BID     lactobacillus rhamnosus (GG)  1 capsule Oral Daily     sodium phosphate  1 enema Rectal Once     docusate sodium  200 mg Oral BID     gabapentin  1,200 mg Oral BID     gabapentin (NEURONTIN) tablet 2,400 mg  2,400 mg Oral Q24H     ceFAZolin  2 g Intravenous Q8H     potassium chloride SA  10 mEq Oral Daily     carvedilol (COREG) tablet 3.125 mg  3.125 mg Oral BID w/meals       Data     Recent Labs  Lab 12/10/17  0910 12/09/17  0847 12/08/17  0750 12/07/17  0813  12/05/17  0750  12/04/17  0806  12/04/17  0550   WBC  --   --  7.4  --   --  7.7  --  6.4  --  5.6   HGB  --  9.3* 9.2* 8.6*  --  9.5*  < > 8.0*  --  8.0*   MCV  --   --  96  --   --  91  --  95  --  95   PLT  --  184 184 164  < > 142*  --  156  --  127*   INR 1.39* 1.16* 1.25*  --   --   --   --  1.65*  < >  --    NA  --   --  137  --   --  137  --   --   --  138   POTASSIUM  --   --  4.2  --   --  4.2  --   --   --  4.2   CHLORIDE  --   --  107  --   --  106  --   --   --  105   CO2  --   --  25  --   --  23  --   --   --  26   BUN  --   --  10  --   --  15  --   --   --  18   CR  --   --  1.19  --   --  1.08  --   --   --  1.43*   ANIONGAP  --   --  5  --   --  8  --   --   --  7   BENNIE  --   --  8.6  --   --  8.2*  --   --   --  7.5*   GLC  --   --  84  --   --  121*  --   --   --  104*   < > = values in this interval not displayed.    No results found for this or any previous visit (from the past 24 hour(s)).

## 2017-12-10 NOTE — PLAN OF CARE
Problem: Patient Care Overview  Goal: Plan of Care/Patient Progress Review  Physical Therapy Discharge Summary    Reason for therapy discharge:    Discharged to transitional care facility.    Progress towards therapy goal(s). See goals on Care Plan in [facility] electronic health record for goal details.  Goals not met.  Barriers to achieving goals:   limited tolerance for therapy and discharge from facility.    Therapy recommendation(s):    Continued therapy is recommended.  Rationale/Recommendations:  PT will benefit from continued skilled rehab services to progress indpenednece and safety with functional mobility..

## 2017-12-10 NOTE — PROGRESS NOTES
SW:  D: Received discharge orders for patient.  Bed is available at [facility] for today.  Patient was requesting a private room and agreed to pay the $36.00/day fee.  Patient to be transported via wheelchair by hospital staff and facility staff through sk[employer]way.  Patient spouse is also available to go with patient to facility.  Updated facility and faxed over the orders and PAS.  PAS-RR    D: Per DHS regulation, SW completed and submitted PAS-RR to [place] [employer] Direct Connect via the Senior Link[employer] Line.  PAS-RR confirmation # is : [id number]    I: SW spoke with patient and they are aware a PAS-RR has been submitted.  SW reviewed with patient that they may be contacted for a follow up appointment within 10 days of hospital discharge if their SNF stay is < 30 days.  Contact information for Senior LinkAge Line was also provided.    A: Patient verbalized understanding.    P: Further questions may be directed to Senior LinkAge Line at #[phone], option #4 for PAS-RR staff.    P: SW will follow for safe discharge plan.    Angus Sofia, MSW, LGSW

## 2017-12-10 NOTE — TELEPHONE ENCOUNTER
Patient with pain - pharmacy unable to deliver pain meds until 8 pm today    PLAN  Ok to give Oxycodone 10 mg PO now one time dose out of ekit  Sent script to this effect to [facility]    Electronically signed by DIPAK Marshall, GNP

## 2017-12-10 NOTE — PLAN OF CARE
Problem: Patient Care Overview  Goal: Plan of Care/Patient Progress Review  Outcome: No Change  A&OX4, VSS, denies pain at this time.  Left leg dressing/ACE CD&I.  Up with 1 and walker.  Voiding well.  Wife here and DC plan reviewed.  Packet given to wife to bring with patient to [facility]. Pt to be DCd to TCU today at 1120 via w/c with wife and NA.

## 2017-12-11 ENCOUNTER — NURSING HOME VISIT (OUTPATIENT)
Dept: GERIATRICS | Facility: CLINIC | Age: 72
End: 2017-12-11
Payer: MEDICARE

## 2017-12-11 VITALS
TEMPERATURE: 97 F | SYSTOLIC BLOOD PRESSURE: 96 MMHG | BODY MASS INDEX: 33 KG/M2 | OXYGEN SATURATION: 98 % | WEIGHT: 249 LBS | HEIGHT: 73 IN | DIASTOLIC BLOOD PRESSURE: 54 MMHG | HEART RATE: 52 BPM | RESPIRATION RATE: 14 BRPM

## 2017-12-11 DIAGNOSIS — N18.30 CHRONIC KIDNEY DISEASE, STAGE III (MODERATE) (H): ICD-10-CM

## 2017-12-11 DIAGNOSIS — D62 ANEMIA DUE TO BLOOD LOSS, ACUTE: ICD-10-CM

## 2017-12-11 DIAGNOSIS — L02.416 ABSCESS OF LEG, LEFT: ICD-10-CM

## 2017-12-11 DIAGNOSIS — T87.44 INFECTION OF LEFT BELOW KNEE AMPUTATION (H): Primary | ICD-10-CM

## 2017-12-11 DIAGNOSIS — I89.0 LYMPHEDEMA: ICD-10-CM

## 2017-12-11 DIAGNOSIS — K70.31 ALCOHOLIC CIRRHOSIS OF LIVER WITH ASCITES (H): ICD-10-CM

## 2017-12-11 DIAGNOSIS — D68.61 ANTIPHOSPHOLIPID ANTIBODY SYNDROME (H): ICD-10-CM

## 2017-12-11 DIAGNOSIS — R53.81 PHYSICAL DECONDITIONING: ICD-10-CM

## 2017-12-11 DIAGNOSIS — I25.119 CORONARY ARTERY DISEASE INVOLVING NATIVE HEART WITH ANGINA PECTORIS, UNSPECIFIED VESSEL OR LESION TYPE (H): ICD-10-CM

## 2017-12-11 DIAGNOSIS — I48.0 PAROXYSMAL A-FIB (H): ICD-10-CM

## 2017-12-11 DIAGNOSIS — I50.9 CHRONIC CONGESTIVE HEART FAILURE, UNSPECIFIED CONGESTIVE HEART FAILURE TYPE: ICD-10-CM

## 2017-12-11 LAB
BACTERIA SPEC CULT: NORMAL
Lab: NORMAL
SPECIMEN SOURCE: NORMAL

## 2017-12-11 PROCEDURE — 99309 SBSQ NF CARE MODERATE MDM 30: CPT | Performed by: NURSE PRACTITIONER

## 2017-12-12 PROBLEM — D64.9 ANEMIA: Status: ACTIVE | Noted: 2017-12-12

## 2017-12-12 PROBLEM — I89.0 LYMPHEDEMA: Status: ACTIVE | Noted: 2017-12-12

## 2017-12-12 PROBLEM — D62 ANEMIA DUE TO BLOOD LOSS, ACUTE: Status: ACTIVE | Noted: 2017-12-12

## 2017-12-13 ENCOUNTER — HOSPITAL LABORATORY (OUTPATIENT)
Dept: OTHER | Facility: CLINIC | Age: 72
End: 2017-12-13

## 2017-12-13 ENCOUNTER — TELEPHONE (OUTPATIENT)
Dept: FAMILY MEDICINE | Facility: CLINIC | Age: 72
End: 2017-12-13

## 2017-12-13 ENCOUNTER — NURSING HOME VISIT (OUTPATIENT)
Dept: GERIATRICS | Facility: CLINIC | Age: 72
End: 2017-12-13
Payer: MEDICARE

## 2017-12-13 VITALS
TEMPERATURE: 99.9 F | DIASTOLIC BLOOD PRESSURE: 54 MMHG | HEIGHT: 73 IN | OXYGEN SATURATION: 91 % | WEIGHT: 249.6 LBS | BODY MASS INDEX: 33.08 KG/M2 | HEART RATE: 51 BPM | SYSTOLIC BLOOD PRESSURE: 108 MMHG | RESPIRATION RATE: 16 BRPM

## 2017-12-13 DIAGNOSIS — D68.61 ANTIPHOSPHOLIPID ANTIBODY SYNDROME (H): ICD-10-CM

## 2017-12-13 DIAGNOSIS — D62 ANEMIA DUE TO BLOOD LOSS, ACUTE: ICD-10-CM

## 2017-12-13 DIAGNOSIS — T87.44 INFECTION OF LEFT BELOW KNEE AMPUTATION (H): Primary | ICD-10-CM

## 2017-12-13 DIAGNOSIS — I89.0 LYMPHEDEMA: ICD-10-CM

## 2017-12-13 DIAGNOSIS — L02.416 ABSCESS OF LEG, LEFT: ICD-10-CM

## 2017-12-13 DIAGNOSIS — I48.0 PAROXYSMAL A-FIB (H): ICD-10-CM

## 2017-12-13 DIAGNOSIS — K70.30 ALCOHOLIC CIRRHOSIS OF LIVER WITHOUT ASCITES (H): ICD-10-CM

## 2017-12-13 DIAGNOSIS — I50.9 CHRONIC CONGESTIVE HEART FAILURE, UNSPECIFIED CONGESTIVE HEART FAILURE TYPE: ICD-10-CM

## 2017-12-13 DIAGNOSIS — B37.2 YEAST DERMATITIS: ICD-10-CM

## 2017-12-13 LAB
ALBUMIN UR-MCNC: NEGATIVE MG/DL
APPEARANCE UR: CLEAR
BACTERIA SPEC CULT: NORMAL
BILIRUB UR QL STRIP: NEGATIVE
COLOR UR AUTO: YELLOW
GLUCOSE UR STRIP-MCNC: NEGATIVE MG/DL
HGB UR QL STRIP: ABNORMAL
HYALINE CASTS #/AREA URNS LPF: 7 /LPF (ref 0–2)
KETONES UR STRIP-MCNC: NEGATIVE MG/DL
LEUKOCYTE ESTERASE UR QL STRIP: ABNORMAL
Lab: NORMAL
NITRATE UR QL: NEGATIVE
PH UR STRIP: 5.5 PH (ref 5–7)
RBC #/AREA URNS AUTO: 7 /HPF (ref 0–2)
SOURCE: ABNORMAL
SP GR UR STRIP: 1.01 (ref 1–1.03)
SPECIMEN SOURCE: NORMAL
SQUAMOUS #/AREA URNS AUTO: <1 /HPF (ref 0–1)
UROBILINOGEN UR STRIP-MCNC: NORMAL MG/DL (ref 0–2)
WBC #/AREA URNS AUTO: 9 /HPF (ref 0–2)

## 2017-12-13 PROCEDURE — 99306 1ST NF CARE HIGH MDM 50: CPT | Performed by: INTERNAL MEDICINE

## 2017-12-14 ENCOUNTER — NURSING HOME VISIT (OUTPATIENT)
Dept: GERIATRICS | Facility: CLINIC | Age: 72
End: 2017-12-14
Payer: MEDICARE

## 2017-12-14 VITALS
OXYGEN SATURATION: 96 % | HEIGHT: 73 IN | SYSTOLIC BLOOD PRESSURE: 106 MMHG | WEIGHT: 249.6 LBS | TEMPERATURE: 99.2 F | DIASTOLIC BLOOD PRESSURE: 55 MMHG | HEART RATE: 57 BPM | BODY MASS INDEX: 33.08 KG/M2 | RESPIRATION RATE: 16 BRPM

## 2017-12-14 DIAGNOSIS — L89.312 DECUBITUS ULCER OF RIGHT BUTTOCK, STAGE 2 (H): ICD-10-CM

## 2017-12-14 DIAGNOSIS — I89.0 LYMPHEDEMA: ICD-10-CM

## 2017-12-14 DIAGNOSIS — I48.0 PAROXYSMAL A-FIB (H): ICD-10-CM

## 2017-12-14 DIAGNOSIS — D68.61 ANTIPHOSPHOLIPID ANTIBODY SYNDROME (H): ICD-10-CM

## 2017-12-14 DIAGNOSIS — R53.81 PHYSICAL DECONDITIONING: ICD-10-CM

## 2017-12-14 DIAGNOSIS — I50.9 CHRONIC CONGESTIVE HEART FAILURE, UNSPECIFIED CONGESTIVE HEART FAILURE TYPE: Primary | ICD-10-CM

## 2017-12-14 DIAGNOSIS — L02.416 ABSCESS OF LEG, LEFT: ICD-10-CM

## 2017-12-14 DIAGNOSIS — B37.2 YEAST INFECTION OF THE SKIN: ICD-10-CM

## 2017-12-14 PROBLEM — L89.92 PRESSURE ULCER, STAGE 2 (H): Status: ACTIVE | Noted: 2017-12-14

## 2017-12-14 PROCEDURE — 99310 SBSQ NF CARE HIGH MDM 45: CPT | Performed by: NURSE PRACTITIONER

## 2017-12-14 RX ORDER — SPIRONOLACTONE 25 MG
12.5 TABLET ORAL
COMMUNITY
End: 2017-12-17

## 2017-12-14 RX ORDER — NYSTATIN 100000 U/G
CREAM TOPICAL 3 TIMES DAILY
Status: ON HOLD | COMMUNITY
End: 2018-01-29

## 2017-12-14 NOTE — PROGRESS NOTES
[facility]    Chief Complaint   Patient presents with     RECHECK       HPI:    Antonio Ramirez is a 72 year old  (1945), who is being seen today for an episodic care visit at [place] [facility].  HPI information obtained from: facility chart records, facility staff, patient report and [facility] chart review.    He came to this facility 12/10/2017 for short term rehab and medical management following hospitalization for fever to 102, edema, erythema, pain  and new open area on his left stump. 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 Ortho consulted and he underwent I&D for a subcutaneous abscess of the stump on 12/1/2017. Wound vac was placed.  ID consulted and vanco and zosyn started. Cultures grew MSSA and antibiotics changed to Ancef. Blood cultures no growth. He returned to the OR 12/8/2017 for removal of wound vac, I&D and revision of BKA stump by Dr Howard. Discharged on Augmentin for 10 days.   He had mild thrombocytopenia, thought to be related to infection and etoh abuse. At discharge: platelet 184,000, Hgb 9.3.     Current issues are:          Chronic congestive heart failure, unspecified congestive heart failure type (H)-wife is here today and concerned about increased edema of RLE. Spironolactone was started yesterday. Denies cough, shortness of breath or chest pain. Compression stocking ordered but hasn't been put on today.Weight unchanged at 249 lbs.   PVRs have been within range and UA normal. Denies urinary symptoms today.   Lymphedema  Paroxysmal a-fib (H)-INR 2.1 today on coumadin 5 mg daily. HR: 56-57  Antiphospholipid antibody syndrome (H)  Abscess of leg, left-afebrile. Reports pain controlled. Ortho has been coming to the tcu for dressing changes.   Yeast infection of the skin-reports area is less painful today. Diflucan given yesterday and nystatin cream started.   Decubitus ulcer of right buttock, stage 2-wife reports a small pressure area was noted while  inpatient. Patient reports area is painful, especially when in bed.   Physical deconditioning-wheelchair, able to stand for pivot transfers. Requires assist with all cares.     BP's: 106/55, 112/69, 106/58      ALLERGIES: Hmg-coa-r inhibitors and Ciprofloxacin  Past Medical, Surgical, Family and Social History reviewed and updated in [facility].    Current Outpatient Prescriptions   Medication Sig Dispense Refill     nystatin (MYCOSTATIN) cream Apply topically 3 times daily       spironolactone (ALDACTONE) 12.5 mg TABS half-tab Take 12.5 mg by mouth 2 times daily        amoxicillin-clavulanate (AUGMENTIN) 875-125 MG per tablet Take 1 tablet by mouth 2 times daily 20 tablet 0     oxyCODONE IR (ROXICODONE) 5 MG tablet Take 1-2 tablets (5-10 mg) by mouth every 4 hours as needed for moderate to severe pain 18 tablet 0     acetaminophen (TYLENOL) 325 MG tablet Take 2 tablets (650 mg) by mouth every 4 hours as needed for other (surgical pain) 100 tablet      lactobacillus rhamnosus, GG, (CULTURELL) capsule Take 1 capsule by mouth daily       docusate sodium (COLACE) 100 MG capsule Take 2 capsules (200 mg) by mouth 2 times daily 60 capsule      polyethylene glycol (MIRALAX/GLYCOLAX) Packet Take 17 g by mouth 2 times daily as needed for constipation (constipation) 7 packet      order for Replaced by [facility] Order Phone [phone] Fax [phone]    Primary Dressing Woun'Dres Gel   Qty DO NOT DISPENSE  Secondary Dressing Mepilex 4x4 Qty 60 (for two wounds)  Length of Need: 1 month  Frequency of dressing change: daily 30 days 0     potassium chloride SA (K-DUR/KLOR-CON M) 10 MEQ CR tablet Take 10 mEq by mouth daily       torsemide (DEMADEX) 20 MG tablet Take 20 mg by mouth 2 times daily        ASPIRIN EC PO Take 81 mg by mouth daily       Gabapentin (NEURONTIN PO) Take 600-1,200 mg by mouth 3 times daily 600 mg in morning, 600 mg midday & 1200 mg at HS       CARVEDILOL PO Take 3.125 mg by mouth 2 times daily (with meals)         "warfarin (COUMADIN) 1 MG tablet Take 5 tablets (5 mg) by mouth daily Check INR 12/11 for further dosing. (Patient taking differently: Take 5 mg by mouth daily ) 30 tablet 0     evolocumab (REPATHA) 140 MG/ML prefilled syringe Inject 1 pen subcutaneously every 2 weeks. For sample use only       Medications reviewed:  Medications reconciled to facility chart and changes were made to reflect current medications as identified as above med list. Below are the changes that were made:   Medications stopped since last EPIC medication reconciliation:   There are no discontinued medications.    Medications started since last [facility] medication reconciliation:  Orders Placed This Encounter   Medications     nystatin (MYCOSTATIN) cream     Sig: Apply topically 3 times daily     spironolactone (ALDACTONE) 12.5 mg TABS half-tab     Sig: Take 100 mg by mouth 2 times daily       REVIEW OF SYSTEMS:  10 point ROS of systems including Constitutional, Eyes, Respiratory, Cardiovascular, Gastroenterology, Genitourinary, Integumentary, Muscularskeletal, Psychiatric were all negative except for pertinent positives noted in my HPI.    Physical Exam:  /55  Pulse 57  Temp 99.2  F (37.3  C)  Resp 16  Ht 6' 0.99\" (1.854 m)  Wt 249 lb 9.6 oz (113.2 kg)  SpO2 96%  BMI 32.94 kg/m2  GENERAL APPEARANCE:  Alert, in no distress  ENT:  Mouth and posterior oropharynx normal, moist mucous membranes, normal hearing acuity  EYES:  EOM normal, conjunctiva and lids normal  NECK:  No adenopathy,masses or thyromegaly  RESP:  respiratory effort and palpation of chest normal, lungs clear to auscultation , no respiratory distress  CV:  Palpation and auscultation of heart done , regular rate and rhythm, 2/6 murmur, peripheral edema 2+ in RLE, positive pedal pulses  ABDOMEN:  soft, nontender, no hepatosplenomegaly or other masses  M/S:   in bed. Left BKA. STRATTON with good strength. No joint inflammation  SKIN:  Erythematous rash of groin. 1 cm superficial " open area of coccyx with surrounding erythema. Dressing to left stump clean and dry.   PSYCH:  oriented X 3, normal insight, judgement and memory    Recent Labs:    Last Basic Metabolic Panel:  Lab Results   Component Value Date     12/08/2017      Lab Results   Component Value Date    POTASSIUM 4.2 12/08/2017     Lab Results   Component Value Date    CHLORIDE 107 12/08/2017     Lab Results   Component Value Date    BENNIE 8.6 12/08/2017     Lab Results   Component Value Date    CO2 25 12/08/2017     Lab Results   Component Value Date    BUN 10 12/08/2017     Lab Results   Component Value Date    CR 1.19 12/08/2017     Lab Results   Component Value Date    GLC 84 12/08/2017     Lab Results   Component Value Date    WBC 7.4 12/08/2017     Lab Results   Component Value Date    RBC 2.93 12/08/2017     Lab Results   Component Value Date    HGB 9.3 12/09/2017     Lab Results   Component Value Date    HCT 28.1 12/08/2017     Lab Results   Component Value Date    MCV 96 12/08/2017     Lab Results   Component Value Date    MCH 31.4 12/08/2017     Lab Results   Component Value Date    MCHC 32.7 12/08/2017     Lab Results   Component Value Date    RDW 15.8 12/08/2017     Lab Results   Component Value Date     12/09/2017     ASSESSMENT / PLAN:  (I50.9) Chronic congestive heart failure, unspecified congestive heart failure type (H)  (primary encounter diagnosis)  (I89.0) Lymphedema  Comment: increased LE edema since admission, spironolactone started yesterday. No other signs of volume overload.   Plan: continue torsemide and spironolactone. Daily weight. Compression stocking for RLE and continue his own compression device daily for 40 mins, per usual routine. BMP tomorrow.     (I48.0) Paroxysmal a-fib (H)  (D68.61) Antiphospholipid Jina Syn, Hypercoag (DVT, PE) -- has IVC filt and Warfarin  Comment: INR therapeutic. HR controlled  Plan: continue coumadin 5 mg daily. INR 12/16/2017. Continue carvedilol.     (L02.416)  Abscess of Left BKA -- S/P I&D 12/1/17, MSSA  Comment: appears improved. Dressing remains dry.   Plan: continue Augmentin for a total of 10 days. Continue gabapentin, oxycodone, tylenol. Follow up with Ortho for dressing changes. CBC     (B37.2) Yeast infection of the skin  Comment: improved  Plan: continue nystatin. Keep area clean and dry    (L89.312) Decubitus ulcer of right buttock, stage 2  Comment: small with no signs of infection  Plan: protective barrier cream. Pressure reducing mattress. Discussed with nurse.     (R53.81) Physical deconditioning  Comment: slow progress in therapies  Plan: continue PHYSICAL THERAPY/OT. Goal is to return home with services.       Total time spent with patient visit at the skilled nursing facility was 38 including patient visit, review of past records and discussion with wife. Greater than 50% of total time spent with counseling and coordinating care due to complexity of care    Electronically signed by  DIPAK Kennedy CNP

## 2017-12-14 NOTE — PROGRESS NOTES
I was contacted by Dr Howard s PA today by phone.    This patient is currently in [facility] recovering from an amputation due to an abscess. The PA informed me that the patient wished that I be informed of him being at [facility]. The PA also did formed me that the patient was having leg swelling.     I don t make rounds at [facility] and [facility] is [facility].    At any rate, I contacted Mr Ramirez s nursing unit at [facility]. I could not get in touch with a person, but I left a message on the nursing unit s voicemail. I wanted to make sure that an Internal Medicine physician was seeing this patient during his TCU stay. I left my clinic and cell phone number on the voicemail, so nursing staff could contact me if needed.

## 2017-12-14 NOTE — PROGRESS NOTES
Antonio Ramirez is a 72 year old male seen December 13, 2017 at [facility] where he was admitted this week after [facility] hospitalization for left stump MSSA abscess.  He had I&D 12/1, then repeat I&D with BKA stump revision and closure of wound on 12/8.    He is now here for Rehab.   Patient had BKA 4 years ago due to gangrene, then recently developed a new open area on his stump, possibly related to a change in the rubber sleeve for his prosthesis.   He had fever, erythema and edema, started on abx at hospital admission, remains on Augmentin.        Patient is seen in his room with nursing staff present as well.  C/o groin rash that is uncomfortable and itchy, worse over past couple of days.    Also concerned about fluid retention and weight gain, also worsened over past several days.   He has new urinary urgency and incontinence, is wearing an incontinence brief that is wet.     Ongoing medical issues include PAF with anticoagulation, antiphospholipid Ab, CAD/CHF and alcoholic cirrhosis.     He has had soft bps since admit.  HR 50-60s.       Past Medical History:   Diagnosis Date     Alcoholism (H)      Amputated left leg (H)      Anemia      Anticardiolipin antibody syndrome (H)      Antiplatelet or antithrombotic long-term use      Aortic stenosis      Arthritis      Balance problem      Cardiomyopathy (H)      Coagulation disorder (H)     cardiolipinantibody syndrome     Coronary artery disease      Gastro-oesophageal reflux disease      Heart attack 2007     Hiatal hernia      Hypercholesterolemia      Hypertension      Left foot drop      Liver disease     alcoholic liver disease, alcoholic cirrohsosis, portal hypertension     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)     ?When diagnosed, patient not aware of this     Moderate mitral regurgitation      Monoclonal gammopathy      Neuropathy      Noninfectious ileitis     diverticulitis of colon     PE (pulmonary embolism) 2006     Prostate cancer (H) 2000     Treated with radiation (seed implants in 2000) -- no problems since     Renal disease     kidney stones     Syncope      Thoracic aortic aneurysm, without rupture      Thrombocytopenia (H)      Thrombosis of leg      Urethral stricture        Past Surgical History:   Procedure Laterality Date     AMPUTATE LEG BELOW KNEE Left 04/2014    related to gangrene, started as foot ulcer related to neuropathy     AMPUTATE LEG BELOW KNEE Left 12/4/2017    Procedure: AMPUTATE LEG BELOW KNEE;  Exploration of Left Leg Below Knee Amputation Stump with Ligation of Bleeders.;  Surgeon: Leandro Arreola MD;  Location:  OR     APPENDECTOMY       APPLY WOUND VAC Left 12/6/2017    Procedure: APPLY WOUND VAC;;  Surgeon: Saima Howard MD;  Location:  SD     ARTHROPLASTY KNEE Right 9/19/2014    Procedure: ARTHROPLASTY KNEE;  Surgeon: Saima Howard MD;  Location:  OR     CARDIAC SURGERY      CABG     CHOLECYSTECTOMY       COLONOSCOPY       CYSTOSCOPY, DILATE URETHRA, COMBINED N/A 10/12/2016    Procedure: COMBINED CYSTOSCOPY, DILATE URETHRA;  Surgeon: Dimitry Bello MD;  Location:  OR     ENDOSCOPIC STRIPPING VEIN(S)       esophagogastroduodenoscopy       EYE SURGERY      cataracts     GENITOURINARY SURGERY      Prostate Seen Implants     HERNIA REPAIR      Umbilical     INCISION AND DRAINAGE LOWER EXTREMITY, COMBINED Left 12/1/2017    Procedure: COMBINED INCISION AND DRAINAGE LOWER EXTREMITY;  INCISION AND DRAINAGE OF LEFT BELOW KNEE AMPUTATION ABSCESS, WOUND VAC PLACEMENT;  Surgeon: Saima Howard MD;  Location:  OR     IR IVC FILTER PLACEMENT       IRRIGATION AND DEBRIDEMENT LOWER EXTREMITY, COMBINED Left 12/6/2017    Procedure: COMBINED IRRIGATION AND DEBRIDEMENT LOWER EXTREMITY;  IRRIGATION AND DEBRIDEMENT OF LEFT BELOW KNEE AMPUTATION SITE WITH WOUND VAC REPLACEMENT;  Surgeon: Saima Howard MD;  Location: [facility]     IRRIGATION AND DEBRIDEMENT LOWER EXTREMITY, COMBINED Left 12/8/2017    Procedure:  "COMBINED IRRIGATION AND DEBRIDEMENT LOWER EXTREMITY;  IRRIGATION AND DEBRIDEMENT OF LEFT BELOW THE KNEE AMPUTATION AND SHORTENING OF THE TIBIA WITH WOUND CLOSURE;  Surgeon: Saima Howard MD;  Location:  OR     ORTHOPEDIC SURGERY      (R) knee scope     ORTHOPEDIC SURGERY      total hip      ORTHOPEDIC SURGERY      elbow surgery       Family History   Problem Relation Age of Onset     Lung Cancer Mother      Heart Failure Father       age 87     Social History   Substance Use Topics     Smoking status: Never Smoker     Smokeless tobacco: Never Used     Alcohol use Yes      Comment: quit 10/2015; now 2-3 Vodkas/night      SH:  Lives with his wife, apartment in [place].     Review Of Systems  Skin: negative   Eyes: negative   Ears/Nose/Throat: hearing loss  Respiratory: No shortness of breath, dyspnea on exertion, cough, or hemoptysis  Cardiovascular: dyspnea on exertion and lower extremity edema; has Lymphapres that he uses at home for chronic lymphedema.    Gastrointestinal: constipation  Genitourinary: incontinence, urgency; appears to be new.     Musculoskeletal: as above.  Previously ambulatory with his prosthesis.      Neurologic: negative  Psychiatric: h/o excessive alcohol consumption  Hematologic/Lymphatic/Immunologic: anemia, coagulopathy  Endocrine: negative      GENERAL APPEARANCE: alert and no distress  /54  Pulse 51  Temp 99.9  F (37.7  C)  Resp 16  Ht 6' 0.99\" (1.854 m)  Wt 249 lb 9.6 oz (113.2 kg)  SpO2 91%  BMI 32.94 kg/m2   HEENT: normocephalic, no lesion or abnormalities  NECK: no adenopathy, no asymmetry, masses, or scars and thyroid normal to palpation  RESP: lungs clear to auscultation - no rales, rhonchi or wheezes  CV: regular rate and rhythm, normal S1 S2  ABDOMEN:  soft, nontender, no HSM or masses and bowel sounds normal  MS: extremities normal- no gross deformities noted, no evidence of inflammation in joints, FROM in all extremities.  2+ LE edema.    Orthopedic PA came " over to change stump dressing today.     SKIN: perineal area and upper medial thighs with fiery red diffuse rash, +maceration  NEURO: Normal strength and tone, sensory exam grossly normal, and speech normal; some motor restlessness  PSYCH: affect anxious and irritable  LYMPHATICS: No cervical,  or supraclavicular nodes     Last Basic Metabolic Panel:  Lab Results   Component Value Date     12/08/2017      Lab Results   Component Value Date    POTASSIUM 4.2 12/08/2017     Lab Results   Component Value Date    CHLORIDE 107 12/08/2017     Lab Results   Component Value Date    BENNIE 8.6 12/08/2017     Lab Results   Component Value Date    CO2 25 12/08/2017     Lab Results   Component Value Date    BUN 10 12/08/2017     Lab Results   Component Value Date    CR 1.19 12/08/2017   GFR 60  Lab Results   Component Value Date    GLC 84 12/08/2017     Lab Results   Component Value Date    WBC 7.4 12/08/2017     Component Value Date    HGB 9.3 12/09/2017     Component Value Date    MCV 96 12/08/2017     Component Value Date     12/09/2017   INR 1.7      IMP/PLAN:  (T87.44) Infection of left below knee amputation (H)  (primary encounter diagnosis)  Comment: s/p stump revision for abscess there   Plan: PHYSICAL THERAPY / OCCUPATIONAL THERAPY for transfers, balance, gait, ADLs.  Discharge goal is return home with his wife.   Pain management with prn oxycodone  Continue Augmentin.      (I50.9) Chronic congestive heart failure, unspecified congestive heart failure type (H)  (I89.0) Lymphedema  Comment: significant volume overload post op, h/o cirrhosis as well.     Plan: restart spironolactone 12.5 mg bid with hold parameters if hypotension.   Continue current dose of torsemide and follow BMP.    Continue to use Lymphapres on PTA schedule    (B37.2) Yeast dermatitis  Comment: macerated and symptomatic  Plan: Diflucan 150mg x1 dose.   Nystatin cream tid with improved pericares.   Try to eliminate incontinence products as  able.     Dysuria  Comment: with new incontinence and urgency  Plan: check PVR and UA/UC      (K70.30) Alcoholic cirrhosis of liver without ascites (H)  Comment: as above  Plan: minimize meds, avoid acetaminophen, NSAIDs for pain.     (D68.61) Antiphospholipid Jina Syn, Hypercoag (DVT, PE) -- has IVC filt and Warfarin  Comment: follows with Hem/Onc  Plan: warfarin per INR.      (I48.0) Paroxysmal a-fib (H)  Comment: controlled VR on carvedilol  Plan: warfarin per INR with goal 2-2.5    (D62) Anemia due to blood loss, acute  Comment: post op  Plan: follow hgb       Shelia Watson MD

## 2017-12-15 ENCOUNTER — TRANSFERRED RECORDS (OUTPATIENT)
Dept: HEALTH INFORMATION MANAGEMENT | Facility: CLINIC | Age: 72
End: 2017-12-15

## 2017-12-15 ENCOUNTER — HOSPITAL LABORATORY (OUTPATIENT)
Dept: OTHER | Facility: CLINIC | Age: 72
End: 2017-12-15

## 2017-12-15 ENCOUNTER — NURSING HOME VISIT (OUTPATIENT)
Dept: GERIATRICS | Facility: CLINIC | Age: 72
End: 2017-12-15
Payer: MEDICARE

## 2017-12-15 VITALS
HEIGHT: 73 IN | BODY MASS INDEX: 33.08 KG/M2 | HEART RATE: 71 BPM | RESPIRATION RATE: 18 BRPM | DIASTOLIC BLOOD PRESSURE: 60 MMHG | SYSTOLIC BLOOD PRESSURE: 123 MMHG | TEMPERATURE: 99.4 F | WEIGHT: 249.6 LBS | OXYGEN SATURATION: 97 %

## 2017-12-15 DIAGNOSIS — D69.6 THROMBOCYTOPENIA (H): ICD-10-CM

## 2017-12-15 DIAGNOSIS — L02.416 ABSCESS OF LEG, LEFT: ICD-10-CM

## 2017-12-15 DIAGNOSIS — E87.5 HYPERKALEMIA: ICD-10-CM

## 2017-12-15 DIAGNOSIS — B37.2 YEAST INFECTION OF THE SKIN: ICD-10-CM

## 2017-12-15 DIAGNOSIS — D68.61 ANTIPHOSPHOLIPID ANTIBODY SYNDROME (H): ICD-10-CM

## 2017-12-15 DIAGNOSIS — R53.81 PHYSICAL DECONDITIONING: ICD-10-CM

## 2017-12-15 DIAGNOSIS — I48.0 PAROXYSMAL A-FIB (H): ICD-10-CM

## 2017-12-15 DIAGNOSIS — R41.0 CONFUSION: Primary | ICD-10-CM

## 2017-12-15 DIAGNOSIS — N18.30 CHRONIC KIDNEY DISEASE, STAGE III (MODERATE) (H): ICD-10-CM

## 2017-12-15 DIAGNOSIS — I89.0 LYMPHEDEMA: ICD-10-CM

## 2017-12-15 DIAGNOSIS — K70.30 ALCOHOLIC CIRRHOSIS OF LIVER WITHOUT ASCITES (H): ICD-10-CM

## 2017-12-15 LAB
ALBUMIN SERPL-MCNC: 2.3 G/DL (ref 3.4–5)
ALP SERPL-CCNC: 115 U/L (ref 40–150)
ALT SERPL W P-5'-P-CCNC: 6 U/L (ref 0–70)
AMMONIA PLAS-SCNC: 58 UMOL/L (ref 10–50)
ANION GAP SERPL CALCULATED.3IONS-SCNC: 4 MMOL/L (ref 3–14)
ANION GAP SERPL CALCULATED.3IONS-SCNC: 7 MMOL/L (ref 3–14)
AST SERPL W P-5'-P-CCNC: 23 U/L (ref 0–45)
BASOPHILS # BLD AUTO: 0.1 10E9/L (ref 0–0.2)
BASOPHILS NFR BLD AUTO: 1.3 %
BILIRUB SERPL-MCNC: 0.7 MG/DL (ref 0.2–1.3)
BUN SERPL-MCNC: 37 MG/DL (ref 7–30)
BUN SERPL-MCNC: 40 MG/DL (ref 7–30)
CALCIUM SERPL-MCNC: 8.3 MG/DL (ref 8.5–10.1)
CALCIUM SERPL-MCNC: 8.3 MG/DL (ref 8.5–10.1)
CHLORIDE SERPL-SCNC: 100 MMOL/L (ref 94–109)
CHLORIDE SERPL-SCNC: 101 MMOL/L (ref 94–109)
CO2 SERPL-SCNC: 25 MMOL/L (ref 20–32)
CO2 SERPL-SCNC: 25 MMOL/L (ref 20–32)
CREAT SERPL-MCNC: 2.4 MG/DL (ref 0.66–1.25)
CREAT SERPL-MCNC: 2.53 MG/DL (ref 0.66–1.25)
DIFFERENTIAL METHOD BLD: ABNORMAL
EOSINOPHIL # BLD AUTO: 0.2 10E9/L (ref 0–0.7)
EOSINOPHIL NFR BLD AUTO: 1.9 %
ERYTHROCYTE [DISTWIDTH] IN BLOOD BY AUTOMATED COUNT: 14.5 % (ref 10–15)
ERYTHROCYTE [DISTWIDTH] IN BLOOD BY AUTOMATED COUNT: 14.7 % (ref 10–15)
GFR SERPL CREATININE-BSD FRML MDRD: 25 ML/MIN/1.7M2
GFR SERPL CREATININE-BSD FRML MDRD: 27 ML/MIN/1.7M2
GLUCOSE SERPL-MCNC: 112 MG/DL (ref 70–99)
GLUCOSE SERPL-MCNC: 81 MG/DL (ref 70–99)
HCT VFR BLD AUTO: 24.6 % (ref 40–53)
HCT VFR BLD AUTO: 26.2 % (ref 40–53)
HGB BLD-MCNC: 8.1 G/DL (ref 13.3–17.7)
HGB BLD-MCNC: 8.4 G/DL (ref 13.3–17.7)
IMM GRANULOCYTES # BLD: 0 10E9/L (ref 0–0.4)
IMM GRANULOCYTES NFR BLD: 0.3 %
INR PPP: 2.67 (ref 0.86–1.14)
LYMPHOCYTES # BLD AUTO: 0.6 10E9/L (ref 0.8–5.3)
LYMPHOCYTES NFR BLD AUTO: 7.8 %
MCH RBC QN AUTO: 30.8 PG (ref 26.5–33)
MCH RBC QN AUTO: 31.2 PG (ref 26.5–33)
MCHC RBC AUTO-ENTMCNC: 32.1 G/DL (ref 31.5–36.5)
MCHC RBC AUTO-ENTMCNC: 32.9 G/DL (ref 31.5–36.5)
MCV RBC AUTO: 95 FL (ref 78–100)
MCV RBC AUTO: 96 FL (ref 78–100)
MONOCYTES # BLD AUTO: 1.5 10E9/L (ref 0–1.3)
MONOCYTES NFR BLD AUTO: 18.3 %
NEUTROPHILS # BLD AUTO: 5.6 10E9/L (ref 1.6–8.3)
NEUTROPHILS NFR BLD AUTO: 70.4 %
NRBC # BLD AUTO: 0 10*3/UL
NRBC BLD AUTO-RTO: 0 /100
PLATELET # BLD AUTO: 164 10E9/L (ref 150–450)
PLATELET # BLD AUTO: 190 10E9/L (ref 150–450)
POTASSIUM SERPL-SCNC: 5.7 MMOL/L (ref 3.4–5.3)
POTASSIUM SERPL-SCNC: 6 MMOL/L (ref 3.4–5.3)
PROT SERPL-MCNC: 5.7 G/DL (ref 6.8–8.8)
RBC # BLD AUTO: 2.6 10E12/L (ref 4.4–5.9)
RBC # BLD AUTO: 2.73 10E12/L (ref 4.4–5.9)
SODIUM SERPL-SCNC: 130 MMOL/L (ref 133–144)
SODIUM SERPL-SCNC: 132 MMOL/L (ref 133–144)
WBC # BLD AUTO: 7.6 10E9/L (ref 4–11)
WBC # BLD AUTO: 8 10E9/L (ref 4–11)

## 2017-12-15 PROCEDURE — 99310 SBSQ NF CARE HIGH MDM 45: CPT | Performed by: NURSE PRACTITIONER

## 2017-12-15 NOTE — PROGRESS NOTES
[facility]    Chief Complaint   Patient presents with     RECHECK       HPI:    Antonio Ramirez is a 72 year old  (1945), who is being seen today for an episodic care visit [facility].  HPI information obtained from: facility chart records, facility staff, patient report and [facility] chart review.    He came to this facility 12/10/2017 for short term rehab and medical management following hospitalization for fever to 102, edema, erythema, pain  and new open area on his left stump. 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 Ortho consulted and he underwent I&D for a subcutaneous abscess of the stump on 12/1/2017. Wound vac was placed.  ID consulted and vanco and zosyn started. Cultures grew MSSA and antibiotics changed to Ancef. Blood cultures no growth. He returned to the OR 12/8/2017 for removal of wound vac, I&D and revision of BKA stump by Dr Howard. Discharged on Augmentin for 10 days.   He had mild thrombocytopenia, thought to be related to infection and etoh abuse. At discharge: platelet 184,000, Hgb 9.3.     Current issues are:          Confusion-he is seen this am  after the nurse reported confusion. Wife and daughter here. Patient oriented but making odd comments. Thinks there is conspiracy involving [place]. Afebrile. Stat labs obtained and WBC is normal.   Chronic kidney disease, stage III (moderate)-creatinine 2.53 and K 6.0 today. Spironolactone started 12/13/2017 for diuresis. Recent UA was normal. Denies urinary symptoms.   Hyperkalemia  Lymphedema-wearing compression stocking on RLE. Weight unchanged.   Alcoholic cirrhosis of liver without ascites (H)-has not had any alcohol while on tcu, confirmed by wife.   Abscess of leg, left- Dressing remains dry and intact after changed by Ortho earlier this week.   Paroxysmal a-fib (H)-INR 2.67 today. No bleeding or bruising. HR: 56-71  Antiphospholipid antibody syndrome (H)  Thrombocytopenia (H)  Yeast infection  of the skin-reports pain has improved and area is less red.   Physical deconditioning-wheelchair and requires assist with all cares.     BP's: 123/60, 117/57, 106/55    ALLERGIES: Hmg-coa-r inhibitors and Ciprofloxacin  Past Medical, Surgical, Family and Social History reviewed and updated in [facility].    Current Outpatient Prescriptions   Medication Sig Dispense Refill     Warfarin Sodium (COUMADIN PO) Take 5 mg by mouth daily       nystatin (MYCOSTATIN) cream Apply topically 3 times daily       amoxicillin-clavulanate (AUGMENTIN) 875-125 MG per tablet Take 1 tablet by mouth 2 times daily 20 tablet 0     oxyCODONE IR (ROXICODONE) 5 MG tablet Take 1-2 tablets (5-10 mg) by mouth every 4 hours as needed for moderate to severe pain 18 tablet 0     acetaminophen (TYLENOL) 325 MG tablet Take 2 tablets (650 mg) by mouth every 4 hours as needed for other (surgical pain) 100 tablet      lactobacillus rhamnosus, GG, (CULTURELL) capsule Take 1 capsule by mouth daily       docusate sodium (COLACE) 100 MG capsule Take 2 capsules (200 mg) by mouth 2 times daily 60 capsule      polyethylene glycol (MIRALAX/GLYCOLAX) Packet Take 17 g by mouth 2 times daily as needed for constipation (constipation) 7 packet      order for [facility] Order Phone [phone] Fax [phone]    Primary Dressing Woun'Dres Gel   Qty DO NOT DISPENSE  Secondary Dressing Mepilex 4x4 Qty 60 (for two wounds)  Length of Need: 1 month  Frequency of dressing change: daily 30 days 0     torsemide (DEMADEX) 20 MG tablet Take 20 mg by mouth 2 times daily        ASPIRIN EC PO Take 81 mg by mouth daily       Gabapentin (NEURONTIN PO) Take 600-1,200 mg by mouth 3 times daily 600 mg in morning, 600 mg midday & 1200 mg at HS       CARVEDILOL PO Take 3.125 mg by mouth 2 times daily (with meals)        evolocumab (REPATHA) 140 MG/ML prefilled syringe Inject 1 pen subcutaneously every 2 weeks. For sample use only       Medications reviewed:  Medications reconciled to  "facility chart and changes were made to reflect current medications as identified as above med list. Below are the changes that were made:   Medications stopped since last EPIC medication reconciliation:   Medications Discontinued During This Encounter   Medication Reason     warfarin (COUMADIN) 1 MG tablet Medication Reconciliation Clean Up       Medications started since last [facility] medication reconciliation:  Orders Placed This Encounter   Medications     Warfarin Sodium (COUMADIN PO)     Sig: Take 5 mg by mouth daily         REVIEW OF SYSTEMS:  10 point ROS of systems including Constitutional, Eyes, Respiratory, Cardiovascular, Gastroenterology, Genitourinary, Integumentary, Muscularskeletal, Psychiatric were all negative except for pertinent positives noted in my HPI.    Physical Exam:  /60  Pulse 71  Temp 99.4  F (37.4  C)  Resp 18  Ht 6' 0.99\" (1.854 m)  Wt 249 lb 9.6 oz (113.2 kg)  SpO2 97%  BMI 32.94 kg/m2  GENERAL APPEARANCE:  Alert, in no distress  ENT:  Mouth and posterior oropharynx normal, moist mucous membranes, normal hearing acuity  EYES:  EOM normal, conjunctiva and lids normal, PERRL  NECK:  No adenopathy,masses or thyromegaly  RESP:  respiratory effort and palpation of chest normal, lungs clear to auscultation , no respiratory distress  CV:  Palpation and auscultation of heart done , regular rate and rhythm, 2/6 murmur,scrotal edema and peripheral edema 1-2+ in RLE, positive pedal pulses  ABDOMEN:  soft, nontender, mild ascites, no hepatosplenomegaly or other masses  M/S:   in bed. Left BKA. STRATTON with good strength. No joint inflammation  SKIN:  dressing to left stump clean, dry, intact. Erythema of groin, skin intact.   PSYCH:  oriented X 3, confused    Recent Labs:    Lab Results   Component Value Date    AST 23 12/15/2017     Lab Results   Component Value Date    ALT 6 12/15/2017     Lab Results   Component Value Date    BILITOTAL 0.7 12/15/2017     Lab Results   Component Value Date "    ALBUMIN 2.3 12/15/2017     Lab Results   Component Value Date    PROTTOTAL 5.7 12/15/2017      Lab Results   Component Value Date    ALKPHOS 115 12/15/2017       Last Basic Metabolic Panel:  Lab Results   Component Value Date     12/15/2017      Lab Results   Component Value Date    POTASSIUM 6.0 12/15/2017     Lab Results   Component Value Date    CHLORIDE 101 12/15/2017     Lab Results   Component Value Date    BENNIE 8.3 12/15/2017     Lab Results   Component Value Date    CO2 25 12/15/2017     Lab Results   Component Value Date    BUN 40 12/15/2017     Lab Results   Component Value Date    CR 2.53 12/15/2017     Lab Results   Component Value Date     12/15/2017     Lab Results   Component Value Date    WBC 8.0 12/15/2017     Lab Results   Component Value Date    RBC 2.60 12/15/2017     Lab Results   Component Value Date    HGB 8.1 12/15/2017     Lab Results   Component Value Date    HCT 24.6 12/15/2017     Lab Results   Component Value Date    MCV 95 12/15/2017     Lab Results   Component Value Date    MCH 31.2 12/15/2017     Lab Results   Component Value Date    MCHC 32.9 12/15/2017     Lab Results   Component Value Date    RDW 14.7 12/15/2017     Lab Results   Component Value Date     12/15/2017       ASSESSMENT / PLAN:  (R41.0) Confusion  (primary encounter diagnosis)  Comment: probably due to oxycodone. WBC normal, afebrile. He was clear later in the day.   Plan: discontinue oxycodone and monitor.     (N18.3) Chronic kidney disease, stage III (moderate)  (E87.5) Hyperkalemia  Comment: worsening creatinine and hyperkalemia likely related to spironolactone.   Plan: discontinue spironolactone and KCl. Hold torsemide tomorrow morning. Repeat BMP tomorrow. If labs not improved, will need to send to ED for further evaluation. He and his wife agree with this plan. Reviewed with Dr Watson.     (I89.0) Lymphedema  Comment: slight improvement. Low albumin probably contributing to edema.    Plan:  continue compression wraps and elevating legs. Discontinue spironolactone as above. Continue torsemide, pending BMP 12/16/2017. Daily weights. Dietary consult.     (K70.30) Alcoholic cirrhosis of liver without ascites (H)  Comment: liver function tests at baseline.   Plan: follow labs.     (L02.416) Abscess of Left BKA -- S/P I&D 12/1/17, MSSA  Comment: infection appears to be improving.   Plan: continue Augmentin. Wound care per Ortho. Discontinue oxycodone. Continue gabapentin and tylenol.     (I48.0) Paroxysmal a-fib (H)  (D68.61) Antiphospholipid Jina Syn, Hypercoag (DVT, PE) -- has IVC filt and Warfarin  Comment: HR controlled. INR therapeutic  Plan: continue coumadin 5 mg daily. INR tomorrow. Continue carvedilol.     (D69.6) Thrombocytopenia -- suspect related to alcohol use  Comment: resolved  Plan: follow CBC    (B37.2) Yeast infection of the skin  Comment: improving  Plan: continue nystatin cream and good teto care.     (R53.81) Physical deconditioning  Comment: slow progress in therapies  Plan: continue PHYSICAL THERAPY/OT.         Total time spent with patient visit at the [facility] nursing facility was 45 min including patient visit, review of past records and discussion with wife. Greater than 50% of total time spent with counseling and coordinating care due to complexity of care, abnormal labs    Electronically signed by  DIPAK Kennedy CNP

## 2017-12-16 ENCOUNTER — TRANSFERRED RECORDS (OUTPATIENT)
Dept: HEALTH INFORMATION MANAGEMENT | Facility: CLINIC | Age: 72
End: 2017-12-16

## 2017-12-16 ENCOUNTER — HOSPITAL LABORATORY (OUTPATIENT)
Dept: OTHER | Facility: CLINIC | Age: 72
End: 2017-12-16

## 2017-12-16 LAB
ANION GAP SERPL CALCULATED.3IONS-SCNC: 6 MMOL/L (ref 3–14)
BUN SERPL-MCNC: 42 MG/DL (ref 7–30)
CALCIUM SERPL-MCNC: 8.4 MG/DL (ref 8.5–10.1)
CHLORIDE SERPL-SCNC: 100 MMOL/L (ref 94–109)
CO2 SERPL-SCNC: 26 MMOL/L (ref 20–32)
CREAT SERPL-MCNC: 2.35 MG/DL (ref 0.66–1.25)
GFR SERPL CREATININE-BSD FRML MDRD: 27 ML/MIN/1.7M2
GLUCOSE SERPL-MCNC: 100 MG/DL (ref 70–99)
POTASSIUM SERPL-SCNC: 5.5 MMOL/L (ref 3.4–5.3)
SODIUM SERPL-SCNC: 132 MMOL/L (ref 133–144)

## 2017-12-17 PROBLEM — R41.0 CONFUSION: Status: ACTIVE | Noted: 2017-12-17

## 2017-12-17 PROBLEM — R41.82 ALTERED MENTAL STATUS: Status: ACTIVE | Noted: 2017-12-17

## 2017-12-18 ENCOUNTER — NURSING HOME VISIT (OUTPATIENT)
Dept: GERIATRICS | Facility: CLINIC | Age: 72
End: 2017-12-18
Payer: MEDICARE

## 2017-12-18 ENCOUNTER — HOSPITAL LABORATORY (OUTPATIENT)
Dept: OTHER | Facility: CLINIC | Age: 72
End: 2017-12-18

## 2017-12-18 VITALS
TEMPERATURE: 98.6 F | SYSTOLIC BLOOD PRESSURE: 123 MMHG | OXYGEN SATURATION: 97 % | HEART RATE: 71 BPM | WEIGHT: 249.6 LBS | DIASTOLIC BLOOD PRESSURE: 60 MMHG | RESPIRATION RATE: 18 BRPM | HEIGHT: 73 IN | BODY MASS INDEX: 33.08 KG/M2

## 2017-12-18 DIAGNOSIS — B37.2 YEAST INFECTION OF THE SKIN: ICD-10-CM

## 2017-12-18 DIAGNOSIS — R53.81 PHYSICAL DECONDITIONING: ICD-10-CM

## 2017-12-18 DIAGNOSIS — N18.30 CHRONIC KIDNEY DISEASE, STAGE III (MODERATE) (H): ICD-10-CM

## 2017-12-18 DIAGNOSIS — D68.61 ANTIPHOSPHOLIPID ANTIBODY SYNDROME (H): ICD-10-CM

## 2017-12-18 DIAGNOSIS — R41.0 CONFUSION: ICD-10-CM

## 2017-12-18 DIAGNOSIS — I48.0 PAROXYSMAL A-FIB (H): ICD-10-CM

## 2017-12-18 DIAGNOSIS — I50.9 CHRONIC CONGESTIVE HEART FAILURE, UNSPECIFIED CONGESTIVE HEART FAILURE TYPE: Primary | ICD-10-CM

## 2017-12-18 DIAGNOSIS — L02.416 ABSCESS OF LEG, LEFT: ICD-10-CM

## 2017-12-18 DIAGNOSIS — K70.30 ALCOHOLIC CIRRHOSIS OF LIVER WITHOUT ASCITES (H): ICD-10-CM

## 2017-12-18 DIAGNOSIS — E87.5 HYPERKALEMIA: ICD-10-CM

## 2017-12-18 DIAGNOSIS — D62 ANEMIA DUE TO BLOOD LOSS, ACUTE: ICD-10-CM

## 2017-12-18 LAB
ANION GAP SERPL CALCULATED.3IONS-SCNC: 7 MMOL/L (ref 3–14)
BUN SERPL-MCNC: 40 MG/DL (ref 7–30)
CALCIUM SERPL-MCNC: 8.3 MG/DL (ref 8.5–10.1)
CHLORIDE SERPL-SCNC: 105 MMOL/L (ref 94–109)
CO2 SERPL-SCNC: 23 MMOL/L (ref 20–32)
CREAT SERPL-MCNC: 1.59 MG/DL (ref 0.66–1.25)
ERYTHROCYTE [DISTWIDTH] IN BLOOD BY AUTOMATED COUNT: 14.6 % (ref 10–15)
GFR SERPL CREATININE-BSD FRML MDRD: 43 ML/MIN/1.7M2
GLUCOSE SERPL-MCNC: 93 MG/DL (ref 70–99)
HCT VFR BLD AUTO: 24.8 % (ref 40–53)
HGB BLD-MCNC: 8 G/DL (ref 13.3–17.7)
MCH RBC QN AUTO: 30.5 PG (ref 26.5–33)
MCHC RBC AUTO-ENTMCNC: 32.3 G/DL (ref 31.5–36.5)
MCV RBC AUTO: 95 FL (ref 78–100)
PLATELET # BLD AUTO: 168 10E9/L (ref 150–450)
POTASSIUM SERPL-SCNC: 5.1 MMOL/L (ref 3.4–5.3)
RBC # BLD AUTO: 2.62 10E12/L (ref 4.4–5.9)
SODIUM SERPL-SCNC: 135 MMOL/L (ref 133–144)
WBC # BLD AUTO: 6.9 10E9/L (ref 4–11)

## 2017-12-18 PROCEDURE — 99310 SBSQ NF CARE HIGH MDM 45: CPT | Performed by: NURSE PRACTITIONER

## 2017-12-20 ENCOUNTER — TRANSFERRED RECORDS (OUTPATIENT)
Dept: HEALTH INFORMATION MANAGEMENT | Facility: CLINIC | Age: 72
End: 2017-12-20

## 2017-12-20 ENCOUNTER — NURSING HOME VISIT (OUTPATIENT)
Dept: GERIATRICS | Facility: CLINIC | Age: 72
End: 2017-12-20
Payer: MEDICARE

## 2017-12-20 ENCOUNTER — HOSPITAL LABORATORY (OUTPATIENT)
Dept: OTHER | Facility: CLINIC | Age: 72
End: 2017-12-20

## 2017-12-20 VITALS
RESPIRATION RATE: 18 BRPM | HEART RATE: 72 BPM | BODY MASS INDEX: 31.81 KG/M2 | TEMPERATURE: 99.1 F | SYSTOLIC BLOOD PRESSURE: 132 MMHG | DIASTOLIC BLOOD PRESSURE: 78 MMHG | HEIGHT: 73 IN | OXYGEN SATURATION: 96 % | WEIGHT: 240 LBS

## 2017-12-20 DIAGNOSIS — B37.2 YEAST INFECTION OF THE SKIN: ICD-10-CM

## 2017-12-20 DIAGNOSIS — D68.61 ANTIPHOSPHOLIPID ANTIBODY SYNDROME (H): ICD-10-CM

## 2017-12-20 DIAGNOSIS — I89.0 LYMPHEDEMA: ICD-10-CM

## 2017-12-20 DIAGNOSIS — N18.30 CHRONIC KIDNEY DISEASE, STAGE III (MODERATE) (H): ICD-10-CM

## 2017-12-20 DIAGNOSIS — D62 ANEMIA DUE TO BLOOD LOSS, ACUTE: ICD-10-CM

## 2017-12-20 DIAGNOSIS — I48.0 PAROXYSMAL A-FIB (H): Primary | ICD-10-CM

## 2017-12-20 DIAGNOSIS — I50.9 CHRONIC CONGESTIVE HEART FAILURE, UNSPECIFIED CONGESTIVE HEART FAILURE TYPE: ICD-10-CM

## 2017-12-20 DIAGNOSIS — L02.416 ABSCESS OF LEG, LEFT: ICD-10-CM

## 2017-12-20 DIAGNOSIS — R53.81 PHYSICAL DECONDITIONING: ICD-10-CM

## 2017-12-20 LAB
ANION GAP SERPL CALCULATED.3IONS-SCNC: 8 MMOL/L (ref 3–14)
BUN SERPL-MCNC: 27 MG/DL (ref 7–30)
CALCIUM SERPL-MCNC: 8.5 MG/DL (ref 8.5–10.1)
CHLORIDE SERPL-SCNC: 108 MMOL/L (ref 94–109)
CO2 SERPL-SCNC: 23 MMOL/L (ref 20–32)
CREAT SERPL-MCNC: 1.35 MG/DL (ref 0.66–1.25)
GFR SERPL CREATININE-BSD FRML MDRD: 52 ML/MIN/1.7M2
GLUCOSE SERPL-MCNC: 79 MG/DL (ref 70–99)
HGB BLD-MCNC: 8.4 G/DL (ref 13.3–17.7)
POTASSIUM SERPL-SCNC: 4.6 MMOL/L (ref 3.4–5.3)
SODIUM SERPL-SCNC: 139 MMOL/L (ref 133–144)

## 2017-12-20 PROCEDURE — 99309 SBSQ NF CARE MODERATE MDM 30: CPT | Performed by: NURSE PRACTITIONER

## 2017-12-20 NOTE — PROGRESS NOTES
[place] [facility]    Chief Complaint   Patient presents with     RECHECK       HPI:    Antonio Ramirez is a 72 year old  (1945), who is being seen today for [facility]  HPI information obtained from: facility chart records, facility staff, patient report and [facility] chart review.    He came to this facility 12/10/2017 for short term rehab and medical management following hospitalization for fever to 102, edema, erythema, pain  and new open area on his left stump. 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 Ortho consulted and he underwent I&D for a subcutaneous abscess of the stump on 12/1/2017. Wound vac was placed.  ID consulted and vanco and zosyn started. Cultures grew MSSA and antibiotics changed to Ancef. Blood cultures no growth. He returned to the OR 12/8/2017 for removal of wound vac, I&D and revision of BKA stump by Dr Howard. Discharged on Augmentin for 10 days.   He had mild thrombocytopenia, thought to be related to infection and etoh abuse. At discharge: platelet 184,000, Hgb 9.3.     Current issues are:         Paroxysmal a-fib (H)-INR 3.2 today on coumadin 4 mg daily. No bleeding. HR: 55-72  Antiphospholipid antibody syndrome (H)  Chronic congestive heart failure, unspecified congestive heart failure type (H)  Lymphedema-weight down 9 lbs. Denies cough, shortness of breath or chest pain.   Chronic kidney disease, stage III (moderate)-bump in creatinine and K with spironolactone. Labs below.   Abscess of leg, left-afebrile. Completes Augmentin today. Reports pain controlled.   Anemia due to blood loss, acute  Yeast infection of the skin-reports pain and redness improved  Physical deconditioning-not fully cooperative with therapies. Ambulating 20 ft with walker and assist. Wheelchair at other times. SLUMS 14/30. He declined CPT.       BP's: 132/78, 128/67, 122/69    ALLERGIES: Hmg-coa-r inhibitors and Ciprofloxacin  Past Medical, Surgical,  Family and Social History reviewed and updated in [facility].    Current Outpatient Prescriptions   Medication Sig Dispense Refill     Warfarin Sodium (COUMADIN PO) Take 3 mg by mouth daily        nystatin (MYCOSTATIN) cream Apply topically 3 times daily       acetaminophen (TYLENOL) 325 MG tablet Take 2 tablets (650 mg) by mouth every 4 hours as needed for other (surgical pain) 100 tablet      lactobacillus rhamnosus, GG, (CULTURELL) capsule Take 1 capsule by mouth daily       order for [facility] Phone [phone] Fax [phone]    Primary Dressing Woun'Dres Gel   Qty DO NOT DISPENSE  Secondary Dressing Mepilex 4x4 Qty 60 (for two wounds)  Length of Need: 1 month  Frequency of dressing change: daily 30 days 0     torsemide (DEMADEX) 20 MG tablet Take 20 mg by mouth 2 times daily        ASPIRIN EC PO Take 81 mg by mouth daily       Gabapentin (NEURONTIN PO) Take 600-1,200 mg by mouth 3 times daily 600 mg in morning, 600 mg midday & 1200 mg at HS       CARVEDILOL PO Take 3.125 mg by mouth 2 times daily (with meals)        evolocumab (REPATHA) 140 MG/ML prefilled syringe Inject 1 pen subcutaneously every 2 weeks. For sample use only       Medications reviewed:  Medications reconciled to facility chart and changes were made to reflect current medications as identified as above med list. Below are the changes that were made:   Medications stopped since last EPIC medication reconciliation:   Medications Discontinued During This Encounter   Medication Reason     amoxicillin-clavulanate (AUGMENTIN) 875-125 MG per tablet Medication Reconciliation Clean Up       Medications started since last [facility] medication reconciliation:  No orders of the defined types were placed in this encounter.    REVIEW OF SYSTEMS:  10 point ROS of systems including Constitutional, Eyes, Respiratory, Cardiovascular, Gastroenterology, Genitourinary, Integumentary, Muscularskeletal, Psychiatric were all negative except for pertinent  "positives noted in my HPI.    Physical Exam:  /78  Pulse 72  Temp 99.1  F (37.3  C)  Resp 18  Ht 6' 0.99\" (1.854 m)  Wt 240 lb (108.9 kg)  SpO2 96%  BMI 31.67 kg/m2  GENERAL APPEARANCE:  Alert, in no distress  ENT:  Mouth and posterior oropharynx normal, moist mucous membranes, normal hearing acuity  EYES:  EOM normal, conjunctiva and lids normal  NECK:  No adenopathy,masses or thyromegaly  RESP:  respiratory effort and palpation of chest normal, lungs clear to auscultation , no respiratory distress  CV:  Palpation and auscultation of heart done , regular rate and rhythm, 2/6 murmur,  edema 1+ in RLE, positive pedal pulses  ABDOMEN:  soft, nontender, mild ascites, no hepatosplenomegaly or other masses  M/S:   Left BKA. STRATTON with good strength. No joint inflammation  SKIN: left stump incision clean,dry, intact. Fading erythema of groin, skin intact.   PSYCH:  oriented X 3, insight impaired.  Mood normal.     Recent Labs:    Last Basic Metabolic Panel:  Lab Results   Component Value Date     12/20/2017      Lab Results   Component Value Date    POTASSIUM 4.6 12/20/2017     Lab Results   Component Value Date    CHLORIDE 108 12/20/2017     Lab Results   Component Value Date    BENNIE 8.5 12/20/2017     Lab Results   Component Value Date    CO2 23 12/20/2017     Lab Results   Component Value Date    BUN 27 12/20/2017     Lab Results   Component Value Date    CR 1.35 12/20/2017     Lab Results   Component Value Date    GLC 79 12/20/2017     Lab Results   Component Value Date    WBC 6.9 12/18/2017     Lab Results   Component Value Date    RBC 2.62 12/18/2017     Lab Results   Component Value Date    HGB 8.4 12/20/2017     Lab Results   Component Value Date    HCT 24.8 12/18/2017     Lab Results   Component Value Date    MCV 95 12/18/2017     Lab Results   Component Value Date    MCH 30.5 12/18/2017     Lab Results   Component Value Date    MCHC 32.3 12/18/2017     Lab Results   Component Value Date    RDW " 14.6 12/18/2017     Lab Results   Component Value Date     12/18/2017     ASSESSMENT / PLAN:  (I48.0) Paroxysmal a-fib (H)  (primary encounter diagnosis)  (D68.61) Antiphospholipid Jina Syn, Hypercoag (DVT, PE) -- has IVC filt and Warfarin  Comment: INR slightly above goal. Rate controlled  Plan: decrease coumadin to 3 mg daily. INR 12/22/2017. Continue carvedilol.     (I50.9) Chronic congestive heart failure, unspecified congestive heart failure type (H)  (I89.0) Lymphedema  Comment: LE edema much improved. No other signs of volume overload. He has not been compliant with daily weights  Plan: increase torsemide back to his usual dose of 20 mg bid. Follow BMP    (N18.3) Chronic kidney disease, stage III (moderate)  Comment: renal function improved  Plan: follow BMP. Avoid nephrotoxins.     (L02.416) Abscess of Left BKA -- S/P I&D 12/1/17, MSSA  Comment: incision healing without signs of infection. Pain controlled  Plan: continue daily and prn wound care. Last dose Augmentin today. Continue tylenol, gabapentin. Ortho follow up as scheduled.     (D62) Anemia due to blood loss, acute  Comment: acute on chronic anemia. Hgb improving. No signs of active bleeding  Plan: follow Hgb    (B37.2) Yeast infection of the skin  Comment: improved  Plan: continue nystatin, good teto care    (R53.81) Physical deconditioning  Comment: progressing in therapies  Plan: continue PHYSICAL THERAPY/OT.         Electronically signed by  DIPAK Kennedy CNP

## 2017-12-22 ENCOUNTER — DISCHARGE SUMMARY NURSING HOME (OUTPATIENT)
Dept: GERIATRICS | Facility: CLINIC | Age: 72
End: 2017-12-22
Payer: MEDICARE

## 2017-12-22 ENCOUNTER — HOSPITAL LABORATORY (OUTPATIENT)
Dept: OTHER | Facility: CLINIC | Age: 72
End: 2017-12-22

## 2017-12-22 VITALS
WEIGHT: 238 LBS | OXYGEN SATURATION: 98 % | TEMPERATURE: 98.4 F | RESPIRATION RATE: 18 BRPM | BODY MASS INDEX: 31.54 KG/M2 | DIASTOLIC BLOOD PRESSURE: 87 MMHG | HEIGHT: 73 IN | SYSTOLIC BLOOD PRESSURE: 164 MMHG | HEART RATE: 62 BPM

## 2017-12-22 DIAGNOSIS — L02.91 ABSCESS: ICD-10-CM

## 2017-12-22 DIAGNOSIS — K70.30 ALCOHOLIC CIRRHOSIS OF LIVER WITHOUT ASCITES (H): ICD-10-CM

## 2017-12-22 DIAGNOSIS — B37.2 YEAST INFECTION OF THE SKIN: ICD-10-CM

## 2017-12-22 DIAGNOSIS — D68.61 ANTIPHOSPHOLIPID ANTIBODY SYNDROME (H): ICD-10-CM

## 2017-12-22 DIAGNOSIS — N18.30 CHRONIC KIDNEY DISEASE, STAGE III (MODERATE) (H): ICD-10-CM

## 2017-12-22 DIAGNOSIS — E87.5 HYPERKALEMIA: ICD-10-CM

## 2017-12-22 DIAGNOSIS — I50.9 CHRONIC CONGESTIVE HEART FAILURE, UNSPECIFIED CONGESTIVE HEART FAILURE TYPE: ICD-10-CM

## 2017-12-22 DIAGNOSIS — T87.44 INFECTION OF LEFT BELOW KNEE AMPUTATION (H): Primary | ICD-10-CM

## 2017-12-22 DIAGNOSIS — R53.81 PHYSICAL DECONDITIONING: ICD-10-CM

## 2017-12-22 DIAGNOSIS — I25.119 CORONARY ARTERY DISEASE INVOLVING NATIVE HEART WITH ANGINA PECTORIS, UNSPECIFIED VESSEL OR LESION TYPE (H): ICD-10-CM

## 2017-12-22 DIAGNOSIS — I89.0 LYMPHEDEMA: ICD-10-CM

## 2017-12-22 DIAGNOSIS — D62 ANEMIA DUE TO BLOOD LOSS, ACUTE: ICD-10-CM

## 2017-12-22 DIAGNOSIS — I48.0 PAROXYSMAL A-FIB (H): ICD-10-CM

## 2017-12-22 DIAGNOSIS — L89.312 DECUBITUS ULCER OF RIGHT BUTTOCK, STAGE 2 (H): ICD-10-CM

## 2017-12-22 LAB
ANION GAP SERPL CALCULATED.3IONS-SCNC: 9 MMOL/L (ref 3–14)
BUN SERPL-MCNC: 18 MG/DL (ref 7–30)
CALCIUM SERPL-MCNC: 8.4 MG/DL (ref 8.5–10.1)
CHLORIDE SERPL-SCNC: 106 MMOL/L (ref 94–109)
CO2 SERPL-SCNC: 25 MMOL/L (ref 20–32)
CREAT SERPL-MCNC: 1.23 MG/DL (ref 0.66–1.25)
GFR SERPL CREATININE-BSD FRML MDRD: 57 ML/MIN/1.7M2
GLUCOSE SERPL-MCNC: 83 MG/DL (ref 70–99)
HGB BLD-MCNC: 8.7 G/DL (ref 13.3–17.7)
POTASSIUM SERPL-SCNC: 3.9 MMOL/L (ref 3.4–5.3)
SODIUM SERPL-SCNC: 140 MMOL/L (ref 133–144)

## 2017-12-22 PROCEDURE — 99316 NF DSCHRG MGMT 30 MIN+: CPT | Performed by: NURSE PRACTITIONER

## 2017-12-22 NOTE — PROGRESS NOTES
Face to Face and Medical Necessity Statement for DME Provider visit    Demographic Information on Antonio Ramirez:  Gender: male  : 1945  [address]   [place] [address]  [phone] (home) None (work)    Medical Record: [id number]  Social Security Number: [id number]  Primary Care Provider: [facility]  Insurance: Payor: MEDICARE / Plan: MEDICARE / Product Type: Medicare /     HPI:   Antonio Ramirez is a 72 year old  (1945), who is being seen today for a face to face provider visit at [facility]; medical necessity statement for DME included. This patient requires the following:  DME Ordered and Medical Necessity Statement   Mechanical Wheelchair  Size: 20 x 18  Corresponding cushion: Yes: standard  Standard foot rests: Yes  Elevating leg rests: Yes  Arm rests: Yes:  Lap tray: No  Dose patient use oxygen? No   Able to propel w/c? Yes  Mobility related ADL that are affected in the home:  Toileting, dressing, bathing, meal prep  The wheelchair is suitable and necessary for use in the patient's home.  Reason why a cane or walker will not meet the patient's needs. (ie: balance, tolerance, level of assistance) unable to stand secondary to left BKA and inability to wear prosthesis until infection resolved  The patient has expressed willingness to use the wheelchair in the home and does have the physical and cognitive ability to maneuver the equipment or has a caregiver who is available, willing, and able to provide assistance in the home with the wheelchair.   Patients functional mobility deficit can be sufficiently resolved by the use of the above wheelchair      Pt needing above DME with expected length of need of 99  due to medical necessity associated with following diagnosis:     Infection of left below knee amputation (H)  Abscess  Paroxysmal a-fib (H)  Antiphospholipid antibody syndrome (H)  Chronic congestive heart failure, unspecified congestive heart failure type  (H)  Lymphedema  Chronic kidney disease, stage III (moderate)  Hyperkalemia  Coronary artery disease involving native heart with angina pectoris, unspecified vessel or lesion type (H)  Alcoholic cirrhosis of liver without ascites (H)  Anemia due to blood loss, acute  Yeast infection of the skin  Decubitus ulcer of right buttock, stage 2  Physical deconditioning      PMH   has a past medical history of Alcoholism (H); Amputated left leg (H); Anemia; Anticardiolipin antibody syndrome (H); Antiplatelet or antithrombotic long-term use; Aortic stenosis; Arthritis; Balance problem; Cardiomyopathy (H); Coagulation disorder (H); Coronary artery disease; Gastro-oesophageal reflux disease; Heart attack (2007); Hiatal hernia; Hypercholesterolemia; Hypertension; Left foot drop; Liver disease; 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  CURRENT MEDICATIONS  Current Outpatient Prescriptions   Medication Sig Dispense Refill     Warfarin Sodium (COUMADIN PO) Take 3 mg by mouth daily        nystatin (MYCOSTATIN) cream Apply topically 3 times daily       acetaminophen (TYLENOL) 325 MG tablet Take 2 tablets (650 mg) by mouth every 4 hours as needed for other (surgical pain) 100 tablet      lactobacillus rhamnosus, GG, (CULTURELL) capsule Take 1 capsule by mouth daily       order for [facility] Order Phone [phone] Fax [phone]    Primary Dressing Woun'Dres Gel   Qty DO NOT DISPENSE  Secondary Dressing Mepilex 4x4 Qty 60 (for two wounds)  Length of Need: 1 month  Frequency of dressing change: daily 30 days 0     torsemide (DEMADEX) 20 MG tablet Take 20 mg by mouth 2 times daily Plus additional 20mg daily Until 2/22 23:59       ASPIRIN EC  "PO Take 81 mg by mouth daily       Gabapentin (NEURONTIN PO) Take 600-1,200 mg by mouth 3 times daily 600 mg in morning, 600 mg midday & 1200 mg at HS       CARVEDILOL PO Take 3.125 mg by mouth 2 times daily (with meals)        evolocumab (REPATHA) 140 MG/ML prefilled syringe Inject 1 pen subcutaneously every 2 weeks. For sample use only       ROS:4 point ROS including Respiratory, CV, GI and , other than that noted in the HPI,  is negative     EXAM  Vitals: /87  Pulse 62  Temp 98.4  F (36.9  C)  Resp 18  Ht 6' 0.99\" (1.854 m)  Wt 238 lb (108 kg)  SpO2 98%  BMI 31.41 kg/m2;BMI= Body mass index is 31.41 kg/(m^2).  GENERAL APPEARANCE:  Alert, in no distress  ENT:  Mouth and posterior oropharynx normal, moist mucous membranes, normal hearing acuity  EYES:  EOM normal, conjunctiva and lids normal  NECK:  No adenopathy,masses or thyromegaly  RESP:  respiratory effort and palpation of chest normal, lungs clear to auscultation , no respiratory distress  CV:  Palpation and auscultation of heart done , regular rate and rhythm, 2/6 murmur, 1+ RLE edema,  positive pedal pulses  ABDOMEN:  soft, nontender, mild ascites, no hepatosplenomegaly or other masses  M/S:   in bed. Left BKA. STRATTON with good strength. No joint inflammation  SKIN:  dressing to left stump clean, dry, intact.Fading erythema of groin, skin intact.   PSYCH:  oriented X 3, impaired insight    ASSESSMENT/PLAN:  1. Infection of left below knee amputation (H)    2. Abscess    3. Paroxysmal a-fib (H)    4. Antiphospholipid Jina Syn, Hypercoag (DVT, PE) -- has IVC filt and Warfarin    5. Chronic congestive heart failure, unspecified congestive heart failure type (H)    6. Lymphedema    7. Chronic kidney disease, stage III (moderate)    8. Hyperkalemia    9. Coronary artery disease involving native heart with angina pectoris, unspecified vessel or lesion type (H)    10. Alcoholic cirrhosis of liver without ascites (H)    11. Anemia due to blood loss, " acute    12. Yeast infection of the skin    13. Decubitus ulcer of right buttock, stage 2    14. Physical deconditioning        Orders:  1. Facility staff/TC to contact [employer] company to get their order form for provider to fill out    ELECTRONICALLY SIGNED BY MICAH CERTIFIED PROVIDER:  DIPAK Kennedy CNP   NPI: [id number]  [facility]  [address] [place] [address], [address]  [place] [address]

## 2017-12-22 NOTE — PROGRESS NOTES
"  [facility] DISCHARGE SUMMARY    PATIENT'S NAME: Antonio Ramirez  YOB: 1945  MEDICAL RECORD NUMBER:  [id number]    PRIMARY CARE PROVIDER AND CLINIC RESPONSIBLE AFTER TRANSFER: Main Hardy [facility] [place] [address] / [place]    CODE STATUS/ADVANCE DIRECTIVES DISCUSSION:   CPR/Full code        Allergies   Allergen Reactions     Hmg-Coa-R Inhibitors Other (See Comments)     Rhabdomyolysis occurred within a couple days     Ciprofloxacin Itching     Severe itching \"like ants were crawling\"       TRANSFERRING PROVIDERS: DIPAK Kennedy CNP, Shelia Watson MD  DATE OF SNF ADMISSION:  December / 10 / 2017  DATE OF SNF (anticipated) DISCHARGE: December / 23 / 2017  DISCHARGE DISPOSITION: FMG Provider   Nursing Facility: [facility] stay 11/29/2017 to 12/10/2017.     Condition on Discharge:  Improving.  Cognitive Scores: SLUMS 14/30 and he declined CPT    Equipment: walker and wheelchair    DISCHARGE DIAGNOSIS:   1. Infection of left below knee amputation (H)    2. Abscess    3. Paroxysmal a-fib (H)    4. Antiphospholipid Jina Syn, Hypercoag (DVT, PE) -- has IVC filt and Warfarin    5. Chronic congestive heart failure, unspecified congestive heart failure type (H)    6. Lymphedema    7. Chronic kidney disease, stage III (moderate)    8. Hyperkalemia    9. Coronary artery disease involving native heart with angina pectoris, unspecified vessel or lesion type (H)    10. Alcoholic cirrhosis of liver without ascites (H)    11. Anemia due to blood loss, acute    12. Yeast infection of the skin    13. Decubitus ulcer of right buttock, stage 2    14. Physical deconditioning        HPI Nursing Facility Course:  HPI information obtained from: facility chart records, facility staff, patient report and [facility] chart review.He came to this facility for short term rehab and medical management following " hospitalization for fever to 102, edema, erythema, pain  and new open area on his left stump. 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 Ortho consulted and he underwent I&D for a subcutaneous abscess of the stump on 12/1/2017. Wound vac was placed.  ID consulted and vanco and zosyn started. Cultures grew MSSA and antibiotics changed to Ancef. Blood cultures no growth. He returned to the OR 12/8/2017 for removal of wound vac, I&D and revision of BKA stump by Dr Howard. Discharged on Augmentin for 10 days.   He had mild thrombocytopenia, thought to be related to infection and etoh abuse. At hospital  discharge: platelet 184,000, Hgb 9.3.     He has worked with PHYSICAL THERAPY/OT with good progress. Not able to wear his LLE prosthesis at this time due to infection.  Able to ambulate short distances with walker and max assist. Wheelchair at other times. Requires assist with all cares. Recommendation is to stay for ongoing rehab, however he and his wife request discharge home.   ASSESSMENT / PLAN:  (T87.44) Infection of left below knee amputation (H)  (primary encounter diagnosis)  (L02.91) Abscess  Comment: incision healing well without signs of infection. Pain improved  Plan: discharge home tomorrow, per their request. Home services and follow up appts as below.     (I48.0) Paroxysmal a-fib (H)  (D68.61) Antiphospholipid Jina Syn, Hypercoag (DVT, PE) -- has IVC filt and Warfarin  Comment: INR slightly high at 3.2 today. No bleeding or bruising. Rate controlled: 62-72.   Plan: coumadin 3 mg daily. Home care nurse to draw INR 12/26/2017. Continue carvedilol.     (I50.9) Chronic congestive heart failure, unspecified congestive heart failure type (H)  (I89.0) Lymphedema  Comment: fluid status and LE edema improved. Weight down 11 lbs.   Plan: continue torsemide bid. Daily weight. Lymphedema compression device per his usual home routine.     (N18.3) Chronic kidney disease, stage III (moderate)  (E87.5)  Hyperkalemia  Comment: bump in creatinine and hyperkalemia after spironolactone started for diureses. Improved with renal function back to baseline. KCl remains on hold.   Plan: follow up labs per PCP.    (I25.119) Coronary artery disease involving native heart with angina pectoris, unspecified vessel or lesion type (H)  Comment: no acute issues  Plan: continue ASA, carvedilol. Hold Repatha while on tcu due to lack of insurance coverage. Cardiology follow up per usual schedule.        (K70.30) Alcoholic cirrhosis of liver without ascites (H)  Comment: ammonia levels and LFTs done due to acute confusion and were within range. Confusion resolved when oxycodone discontinued  Plan: follow up GI per usual routine.     (D62) Anemia due to blood loss, acute  Comment: Hgb dropped to 8.0 and is slowly improving. Possibly some dilutional component. No signs of active bleeding.   Plan: follow up labs per PCP    (B37.2) Yeast infection of the skin  Comment: severe infection of groin. Much improved with diflucan X 1 dose and nystatin cream  Plan: continue nystatin. Good teto care.     (L89.312) Decubitus ulcer of right buttock, stage 2  Comment: almost healed  Plan: keep area clean and dry, protective barrier cream daily and prn    (R53.81) Physical deconditioning  Comment: progress in therapies as above.   Plan: home therapies for ongoing gait training, strengthening and ADL safety.     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    DISCHARGE MEDICATIONS:  Current Outpatient Prescriptions   Medication Sig Dispense Refill     Warfarin Sodium (COUMADIN PO) Take 3 mg by mouth daily        nystatin (MYCOSTATIN) cream Apply topically 3 times daily       acetaminophen (TYLENOL) 325 MG tablet Take 2 tablets (650 mg) by mouth every 4 hours as needed for other (surgical pain) 100 tablet      lactobacillus rhamnosus, GG, (CULTURELL) capsule Take 1 capsule by mouth daily       order for DME [facility] Order Phone [phone] Fax [phone]    Primary Dressing Woun'Dres Gel   Qty DO NOT DISPENSE  Secondary Dressing Mepilex 4x4 Qty 60 (for two wounds)  Length of Need: 1 month  Frequency of dressing change: daily 30 days 0     torsemide (DEMADEX) 20 MG tablet Take 20 mg by mouth 2 times daily Plus additional 20mg daily Until 2/22 23:59       ASPIRIN EC PO Take 81 mg by mouth daily       Gabapentin (NEURONTIN PO) Take 600-1,200 mg by mouth 3 times daily 600 mg in morning, 600 mg midday & 1200 mg at HS       CARVEDILOL PO Take 3.125 mg by mouth 2 times daily (with meals)        evolocumab (REPATHA) 140 MG/ML prefilled syringe Inject 1 pen subcutaneously every 2 weeks. For sample use only         MEDICATION CHANGES/RATIONALE:   Augmentin completed 12/20/2017  Seroquel discontinued-not used  Spironolactone started 12/13, discontinued 12/15 due to hyperkalemia and bump in creatinine  Torsemide decreased to daily, resumed at bid when renal function improved.   KCl discontinued  Oxycodone discontinued due to confusion  Nystatin cream for yeast infection  Diflucan X 1 12/13/2017 for yeast infection  Coumadin dosed for goal INR 2-3    Controlled medications sent with patient: not  "applicable/none     ROS:    10 point ROS of systems including Constitutional, Eyes, Respiratory, Cardiovascular, Gastroenterology, Genitourinary, Integumentary, Muscularskeletal, Psychiatric were all negative except for pertinent positives noted in my HPI.    Physical Exam:   Vitals: /87  Pulse 62  Temp 98.4  F (36.9  C)  Resp 18  Ht 6' 0.99\" (1.854 m)  Wt 238 lb (108 kg)  SpO2 98%  BMI 31.41 kg/m2  BMI= Body mass index is 31.41 kg/(m^2).    GENERAL APPEARANCE:  Alert, in no distress  ENT:  Mouth and posterior oropharynx normal, moist mucous membranes, normal hearing acuity  EYES:  EOM normal, conjunctiva and lids normal  NECK:  No adenopathy,masses or thyromegaly  RESP:  respiratory effort and palpation of chest normal, lungs clear to auscultation , no respiratory distress  CV:  Palpation and auscultation of heart done , regular rate and rhythm, 2/6 murmur, 1+ RLE edema,  positive pedal pulses  ABDOMEN:  soft, nontender, mild ascites, no hepatosplenomegaly or other masses  M/S:   in bed. Left BKA. STRATTON with good strength. No joint inflammation  SKIN:  dressing to left stump clean, dry, intact.Fading erythema of groin, skin intact.   PSYCH:  oriented X 3, impaired insight    DISCHARGE PLAN:  Occupational Therapy, Physical Therapy, Registered Nurse, Home Health Aide and From:  [facility]  Patient instructed to follow-up with:  PCP in 7 days      Current [place] scheduled appointments:  No future appointments.    MTM referral needed and placed by this provider: No    Pending labs: None  SNF labs   Last Basic Metabolic Panel:  Lab Results   Component Value Date     12/22/2017      Lab Results   Component Value Date    POTASSIUM 3.9 12/22/2017     Lab Results   Component Value Date    CHLORIDE 106 12/22/2017     Lab Results   Component Value Date    BENNIE 8.4 12/22/2017     Lab Results   Component Value Date    CO2 25 12/22/2017     Lab Results   Component Value Date    BUN 18 12/22/2017     Lab " Results   Component Value Date    CR 1.23 12/22/2017     Lab Results   Component Value Date    GLC 83 12/22/2017     Lab Results   Component Value Date    WBC 6.9 12/18/2017     Lab Results   Component Value Date    RBC 2.62 12/18/2017     Lab Results   Component Value Date    HGB 8.7 12/22/2017     Lab Results   Component Value Date    HCT 24.8 12/18/2017     Lab Results   Component Value Date    MCV 95 12/18/2017     Lab Results   Component Value Date    MCH 30.5 12/18/2017     Lab Results   Component Value Date    MCHC 32.3 12/18/2017     Lab Results   Component Value Date    RDW 14.6 12/18/2017     Lab Results   Component Value Date     12/18/2017     Discharge Treatments: Daily and prn wound care to left stump: cleanse, cover with dry dressing and ace wrap    TOTAL DISCHARGE TIME:   Greater than 30 minutes  Electronically signed by:  DIPAK Kennedy CNP

## 2017-12-26 ENCOUNTER — TELEPHONE (OUTPATIENT)
Dept: FAMILY MEDICINE | Facility: CLINIC | Age: 72
End: 2017-12-26

## 2017-12-26 PROCEDURE — 85260 CLOT FACTOR X STUART-POWER: CPT | Performed by: INTERNAL MEDICINE

## 2017-12-26 RX ORDER — POTASSIUM CHLORIDE 750 MG/1
10 TABLET, EXTENDED RELEASE ORAL DAILY
Status: CANCELLED
Start: 2017-12-26

## 2017-12-26 RX ORDER — AMOXICILLIN 500 MG/1
CAPSULE ORAL
Status: CANCELLED
Start: 2017-12-26

## 2017-12-26 NOTE — TELEPHONE ENCOUNTER
"Spoke with Lanette [facility] and patient  Rehab discharge med list was different from what pt thought he was taking - discharged 12/23/17 from rehab     Med discrepancies (pended below listed meds in  as \"no print out\")  1) Taking Torsemide and there wasn't any potassium listed, but pt thinks supposed to be on 10mEq Klor-Con once a day - wants clarification should he take this?   2) Pt on Vitamin D 1000 units - is not on med list in [facility] but pt told [facility] he takes this - is this okay for pt to continue?   3) Pt states he takes Amoxillin 500mg 4 capsules (2000mg) 1 hour prior to dental visits   4) Had Amoxicillin on rehab med list 11/2017 500mg, 1 tablet four times daily - pt states orthopedist started pt on this for debridement of the leg - advised he call orthopedist if any questions related to this medication     Also noted home care referral was placed during pt's recent hospital stay  Okay to give verbal on below request for orders?    Nurse also wanted to update PCP - FYI:  Pt has small left tibial tear - applying bacitracin and bandage daily  Home care nurse listened to heart for about a minute and thought she heard a heart murmur - wanted PCP to be aware    Spoke with pt - scheduled TCU/hospital follow up for 1/5/18    Please advise    Yoly NEW RN      "

## 2017-12-26 NOTE — TELEPHONE ENCOUNTER
The TCU discharge summary indicated that the patient had hyperkalemia when spironolactone was added to diuretic regimen and as such potassium chloride was purposefully held.  It may need to be restarted pending a follow up BMP which should likely be drawn when the patient has a follow up appointment with me.       It is OK for him to take his vitamin D    I will listen for a murmur when I see the patient in hospital follow up which should be within 7 days of TCU discharge, and look at skin problem she mentioned    As for the prophylactic antibiotics, hopefully he is not planning any dental procedures prior to his follow up appointment with me, we can sort that out then too

## 2017-12-26 NOTE — TELEPHONE ENCOUNTER
Reason for Call:  Home Health Care    Lanette with [facility] called regarding (reason for call):     Orders are needed for this patient. Orders    PT: Eval and treat    OT: Eval and Treat     Skilled Nursinx a week for 1 week  1x a week for 4 weeks   2 PRN's    HC nurse also needs to clarify:    amoxicillin, vitamin D, Potassium and Probiotic     Some of the medications were not on patients Discharge papers     HC Nurse also thought she detected a heart murmer when she listened, she would like to discuss this     Pt Provider: Dr. Hardy     Phone Number Homecare Nurse can be reached at: Wx. [phone]    Can we leave a detailed message on this number? YES      Call taken on 2017 at 11:34 AM by Maria G Hardin

## 2017-12-26 NOTE — TELEPHONE ENCOUNTER
Spoke with home care nurse and informed of below message from PCP  States she will relay this information to patient   Also added Vitamin D to patient's current med list (historical)  Gave verbals on below request for home care orders     Yoly NEW RN

## 2017-12-27 LAB — FACT X ACT/NOR PPP CHRO: 23 % (ref 70–130)

## 2017-12-28 ENCOUNTER — DOCUMENTATION ONLY (OUTPATIENT)
Dept: CARE COORDINATION | Facility: CLINIC | Age: 72
End: 2017-12-28

## 2017-12-28 ENCOUNTER — CARE COORDINATION (OUTPATIENT)
Dept: CARE COORDINATION | Facility: CLINIC | Age: 72
End: 2017-12-28

## 2017-12-28 NOTE — LETTER
Health Care Home - Access Care Plan    About Me  Patient Name:  Antonio Ramirez    YOB: 1945  Age:                             72 year old   [place] MRN:            [id number] Telephone Information:     Home Phone [phone]   Mobile [phone]       Address:    [address]   [place] [address] Email address:  [email or web address]      Emergency Contact(s)  Name Relationship Lgl Grd Work Phone Home Phone Mobile Phone   MATT RINALDI Spouse  none [phone]             Health Maintenance:      My Access Plan  Medical Emergency 911   Questions or concerns during clinic hours Primary Clinic Line, I will call the clinic directly:  [phone]   24 Hour Appointment Line [phone] or  [phone]) (toll free)   24 Hour Nurse Line [phone] (toll free)   Questions or concerns outside clinic hours 24 Hour Appointment Line, I will call the after-hours on-call line:   [facility] [phone] or [phone] ([phone]) (toll-free)   Preferred Urgent Care     Preferred Hospital     Preferred Pharmacy [facility] [address]     Behavioral Health Crisis Line The National Suicide Prevention Lifeline at 1-412.598.9983 or 911     My Care Team Members  Patient Care Team       Relationship Specialty Notifications Start End    Main Hardy MD PCP - General Internal Medicine  5/24/17     Phone: [phone] Pager: [phone] Fax: [phone]        [facility] [address] [place] [address]    Sylvie Staley, RN Clinic Care Coordinator  Admissions 12/28/17     Comment:  PH: [phone]        My Medical and Care Information  Problem List   Patient Active Problem List   Diagnosis     Right knee DJD     Alcohol abuse     Alcoholic cirrhosis of liver (H)     Chronic kidney disease, stage III (moderate)     Physical deconditioning     CAD, MI in 2007 -- S/P CABG x 3 in 2007      Prostate cancer -- S/P Radiative Seed implants in 2000 (no problems since)     Antiphospholipid Jina Syn, Hypercoag (DVT, PE) -- has IVC filt and Warfarin     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 (H)     Thoracic aortic aneurysm without rupture (H)     Chronic congestive heart failure, unspecified congestive heart failure type (H)     Abscess of Left BKA -- S/P I&D 12/1/17, MSSA     Paroxysmal a-fib (H)     Cellulitis and abscess of leg     Thrombocytopenia -- suspect related to alcohol use     Abscess     Infection of left below knee amputation (H)     Lymphedema     Anemia due to blood loss, acute     Yeast infection of the skin     Pressure ulcer, stage 2     Hyperkalemia     Confusion      Current Medications and Allergies:  See printed Medication Report

## 2017-12-28 NOTE — PROGRESS NOTES
[facility] now requests orders and shares plan of care/discharge summaries for some patients through [employer].  Please REPLY TO THIS MESSAGE in order to give authorization for orders when needed.  This is considered a verbal order, you will still receive a faxed copy of orders for signature.  Thank you for your assistance in improving collaboration for our patients.    ORDER PT eval completed. Physical therapy 2x/wk x 3wks for therapeutic ex, pre-gait/gait training, transfer training, set up HEP.    MD SUMMARY/PLAN OF CARE

## 2017-12-28 NOTE — PROGRESS NOTES
Clinic Care Coordination Contact   Care Coordination Communication    Clinical Data: Patient was hospitalized at [facility] from 11/29 to 12/10/17 with diagnosis of infection of BKA.     Home Care Contact:              Home Care Agency:  Home Care              Contact name () and phone number: Latasha Flower [phone]              Care Coordination contacted home care: Yes              Anticipated start of care date: 11/26/17    Patient Contact:               Introduced self and role of care coordination.               Discharge instructions were reviewed with patient/caregiver.               Do you have any questions about your medications? Medications were reviewed with home care. No questions/concerns.              Follow up appointment is scheduled for 01/05/18.              Provided 24 Hour Nurse Line and/or 24 Hour Appointment Scheduling: Yes              Home care has contacted patient: Yes              Patient questions/concerns: No questions at this time. Patient was in an appointment at time of call. Will mail CC intro letter and access plan to patient.     Plan: RN/SW Care Coordinator will await notification from home care staff informing RN/SW Care Coordinator of patients discharge plans/needs. RN/SW Care Coordinator will review chart and outreach to home care every 4 weeks and as needed.      ROSY KiserN, RN, PHN  Clinic Care Coordinator  [facility]  [phone]

## 2017-12-28 NOTE — LETTER
[facility] CARE COORDINATION  [address]  [place] [address]                  Phone: [phone]    December 29, 2017        Antonio Ramirez  [address]   [facility] [address]      Dear Antonio,    I am a clinic care coordinator who works with Main Hardy MD at [phone]. I wanted to introduce myself and provide you with my contact information so that you can call me with questions or concerns about your health care. Below is a description of clinic care coordination and how I can further assist you.     The clinic care coordinator is a registered nurse and/or  who understand the health care system. The goal of clinic care coordination is to help you manage your health and improve access to the [facility] system in the most efficient manner. The registered nurse can assist you in meeting your health care goals by providing education, coordinating services, and strengthening the communication among your providers. The  can assist you with financial, behavioral, psychosocial, chemical dependency, counseling, and/or psychiatric resources.    Please feel free to contact me at [phone], with any questions or concerns. We at [facility] are focused on providing you with the highest-quality healthcare experience possible and that all starts with you.     Sincerely,     Sylvie Staley    Enclosed: I have enclosed a copy of a 24 Hour Access Plan. This has helpful phone numbers for you to call when needed. Please keep this in an easy to access place to use as needed.

## 2017-12-29 NOTE — PROGRESS NOTES
Verbal orders given to below orders. Homecare will fax orders for PCP signature.   ROSY KiserN, RN, PHN  Clinic Care Coordinator  [facility]  [phone]

## 2018-01-01 LAB
FUNGUS SPEC CULT: NORMAL
Lab: NORMAL
SPECIMEN SOURCE: NORMAL

## 2018-01-02 ENCOUNTER — DOCUMENTATION ONLY (OUTPATIENT)
Dept: CARE COORDINATION | Facility: CLINIC | Age: 73
End: 2018-01-02

## 2018-01-02 NOTE — PROGRESS NOTES
[facility] now requests orders and shares plan of care/discharge summaries for some patients through [employer].  Please REPLY TO THIS MESSAGE in order to give authorization for orders when needed.  This is considered a verbal order, you will still receive a faxed copy of orders for signature.  Thank you for your assistance in improving collaboration for our patients.    ORDER    OT 1w1 2w1 for DME and strengthening    The plan will also include falls prevention plan, monitor and treat pain, monitor skin integrity, continence management, monitor for s/s of depression, diabetic foot care, monitor for symptoms of heart failure and to achieve the following goals.    With in two weeks  Pt will demonstrate safety with tub/shower transfer using appropriate equipment Independently.   Pt will perform /pinch HEP to increase strength/functional endurance for ADLs/IADLs Independently.       MD SUMMARY/PLAN OF CARE    DISCHARGE SUMMARY

## 2018-01-03 ENCOUNTER — DOCUMENTATION ONLY (OUTPATIENT)
Dept: CARE COORDINATION | Facility: CLINIC | Age: 73
End: 2018-01-03

## 2018-01-03 NOTE — PROGRESS NOTES
[facility] now requests orders and shares plan of care/discharge summaries for some patients through [facility].  Please REPLY TO THIS MESSAGE in order to give authorization for orders when needed.  This is considered a verbal order, you will still receive a faxed copy of orders for signature.  Thank you for your assistance in improving collaboration for our patients.    ORDER    Patients L below the knee amputation incision has a few pinhole sized openings with scant amount of serosang. Drainage, no s/sx of infection; requesting wound care orders for 3x/week until healed. Wife to complete on non nursing days.     Clean wound with soap and water  Pat dry  Cover with island dressing.    Thank you,     Latasha Flower RN  [phone]

## 2018-01-05 ENCOUNTER — OFFICE VISIT (OUTPATIENT)
Dept: FAMILY MEDICINE | Facility: CLINIC | Age: 73
End: 2018-01-05
Payer: MEDICARE

## 2018-01-05 VITALS
TEMPERATURE: 97.3 F | HEART RATE: 62 BPM | DIASTOLIC BLOOD PRESSURE: 67 MMHG | SYSTOLIC BLOOD PRESSURE: 127 MMHG | BODY MASS INDEX: 29.16 KG/M2 | HEIGHT: 73 IN | WEIGHT: 220 LBS | OXYGEN SATURATION: 99 %

## 2018-01-05 DIAGNOSIS — D62 ANEMIA DUE TO BLOOD LOSS, ACUTE: ICD-10-CM

## 2018-01-05 DIAGNOSIS — I50.9 CHRONIC CONGESTIVE HEART FAILURE, UNSPECIFIED CONGESTIVE HEART FAILURE TYPE: ICD-10-CM

## 2018-01-05 DIAGNOSIS — T87.44 INFECTION OF LEFT BELOW KNEE AMPUTATION (H): Primary | ICD-10-CM

## 2018-01-05 DIAGNOSIS — D68.61 ANTIPHOSPHOLIPID ANTIBODY SYNDROME (H): ICD-10-CM

## 2018-01-05 DIAGNOSIS — E87.5 HYPERKALEMIA: ICD-10-CM

## 2018-01-05 LAB
ERYTHROCYTE [DISTWIDTH] IN BLOOD BY AUTOMATED COUNT: 14.7 % (ref 10–15)
HCT VFR BLD AUTO: 29.5 % (ref 40–53)
HGB BLD-MCNC: 9.4 G/DL (ref 13.3–17.7)
MCH RBC QN AUTO: 30.1 PG (ref 26.5–33)
MCHC RBC AUTO-ENTMCNC: 31.9 G/DL (ref 31.5–36.5)
MCV RBC AUTO: 95 FL (ref 78–100)
PLATELET # BLD AUTO: 134 10E9/L (ref 150–450)
RBC # BLD AUTO: 3.12 10E12/L (ref 4.4–5.9)
WBC # BLD AUTO: 4.6 10E9/L (ref 4–11)

## 2018-01-05 PROCEDURE — 36415 COLL VENOUS BLD VENIPUNCTURE: CPT | Performed by: INTERNAL MEDICINE

## 2018-01-05 PROCEDURE — 80048 BASIC METABOLIC PNL TOTAL CA: CPT | Performed by: INTERNAL MEDICINE

## 2018-01-05 PROCEDURE — 85027 COMPLETE CBC AUTOMATED: CPT | Performed by: INTERNAL MEDICINE

## 2018-01-05 PROCEDURE — 99214 OFFICE O/P EST MOD 30 MIN: CPT | Performed by: INTERNAL MEDICINE

## 2018-01-05 NOTE — MR AVS SNAPSHOT
After Visit Summary   1/5/2018    Antonio aRmirez    MRN: [id number]           Patient Information     Date Of Birth          1945        Visit Information        Provider Department      1/5/2018 2:00 PM Main Hardy MD [facility]        Today's Diagnoses     Infection of left below knee amputation (H)    -  1    Hyperkalemia        Anemia due to blood loss, acute        Chronic congestive heart failure, unspecified congestive heart failure type (H)        Antiphospholipid Jina Syn, Hypercoag (DVT, PE) -- has IVC filt and Warfarin           Follow-ups after your visit        Who to contact     If you have questions or need follow up information about today's clinic visit or your schedule please contact [facility] directly at [phone].  Normal or non-critical lab and imaging results will be communicated to you by MyChart, letter or phone within 4 business days after the clinic has received the results. If you do not hear from us within 7 days, please contact the clinic through [employer] or phone. If you have a critical or abnormal lab result, we will notify you by phone as soon as possible.  Submit refill requests through [employer] or call your pharmacy and they will forward the refill request to us. Please allow 3 business days for your refill to be completed.          Additional Information About Your Visit        MyChart Information     [employer] gives you secure access to your electronic health record. If you see a primary care provider, you can also send messages to your care team and make appointments. If you have questions, please call your primary care clinic.  If you do not have a primary care provider, please call [phone] and they will assist you.        Care EveryWhere ID     This is your Care EveryWhere ID. This could be used by other organizations to access your [facility] medical records  [id number]        Your Vitals Were     Pulse Temperature Height  "Pulse Oximetry BMI (Body Mass Index)       62 97.3  F (36.3  C) (Oral) 6' 1\" (1.854 m) 99% 29.03 kg/m2        Blood Pressure from Last 3 Encounters:   01/05/18 127/67   12/22/17 164/87   12/20/17 132/78    Weight from Last 3 Encounters:   01/05/18 220 lb (99.8 kg)   12/22/17 238 lb (108 kg)   12/20/17 240 lb (108.9 kg)              We Performed the Following     Basic metabolic panel     CBC with platelets        Primary Care Provider Office Phone # Fax #    Main Hardy [phone]       [facility] [address] [address]        Equal Access to Services     ANTHONY DORANTES : Hadii peyton munguia hadasho Soomaali, waaxda luqadaha, qaybta kaalmada adeegyada, waxamanda kooin haymayito miller . So [facility] [phone].    ATENCIÓN: Si habla español, tiene a shrestha disposición servicios gratuitos de asistencia lingüística. Llame al [phone].    We comply with applicable federal civil rights laws and [place] laws. We do not discriminate on the basis of race, color, national origin, age, disability, sex, sexual orientation, or gender identity.            Thank you!     Thank you for choosing [facility]  for your care. Our goal is always to provide you with excellent care. Hearing back from our patients is one way we can continue to improve our services. Please take a few minutes to complete the written survey that you may receive in the mail after your visit with us. Thank you!             Your Updated Medication List - Protect others around you: Learn how to safely use, store and throw away your medicines at www.disposemymeds.org.          This list is accurate as of: 1/5/18  3:11 PM.  Always use your most recent med list.                   Brand Name Dispense Instructions for use Diagnosis    ASPIRIN EC PO      Take 81 mg by mouth daily        CARVEDILOL PO      Take 3.125 mg by mouth 2 times daily (with meals)        cholecalciferol 1000 UNIT tablet    vitamin " D3     Take 1 tablet (1,000 Units) by mouth daily        COUMADIN PO      Take 3 mg by mouth daily        lactobacillus rhamnosus (GG) capsule      Take 1 capsule by mouth daily    Cellulitis and abscess of left lower extremity       NEURONTIN PO      Take 600-1,200 mg by mouth 3 times daily 600 mg in morning, 600 mg midday & 1200 mg at HS        nystatin cream    MYCOSTATIN     Apply topically 3 times daily        torsemide 20 MG tablet    DEMADEX     Take 20 mg by mouth 2 times daily

## 2018-01-05 NOTE — PROGRESS NOTES
SUBJECTIVE:   Antonio Ramirez is a 72 year old male who presents to clinic today for the following health issues:          Hospital Follow-up Visit:    Hospital/Nursing Home/IP Rehab Facility: [facility]  Date of Admission: 12-  Date of Discharge: 12-  Reason(s) for Admission: Infection of left below knee amputation             Problems taking medications regularly:  None       Medication changes since discharge: None       Problems adhering to non-medication therapy:  None    Summary of hospitalization:  [facility] discharge summary reviewed  Diagnostic Tests/Treatments reviewed.  Follow up needed: orthopedic surgery   Other Healthcare Providers Involved in Patient s Care:         None  Update since discharge: improved.     Post Discharge Medication Reconciliation: discharge medications reconciled and changed, per note/orders (see AVS).  Plan of care communicated with patient and family     Coding guidelines for this visit:  Type of Medical   Decision Making Face-to-Face Visit       within 7 Days of discharge Face-to-Face Visit        within 14 days of discharge   Moderate Complexity [id number]   High Complexity [id number]              72-year-old man with multiple medical problems including antiphospholipid antibodies syndrome with history of recurrent deep venous thromboses/pulmonary emboli, congestive heart failure, cirrhotic liver disease related to alcoholism, peripheral neuropathy, lymphoma, left below the knee amputation, paroxysmal atrial fibrillation.    He presents in follow-up after being hospitalized for an abscess of his amputation stump site requiring revision amputation.  He had a short transitional care unit stay.  He did require transfusions for blood loss anemia.  There is also mention of starting him on Spironolactone for diuresis with resultant hyperkalemia although he is not currently taking Spironolactone.  He is taking torsemide.  His potassium  supplements were stopped.  Discharge documentation suggest checking hemoglobin and potassium levels following discharge.  The patient feels well and is without complaints today.  His postoperative pain is well controlled.  He has pain in his contralateral hip and was actually planning a right total hip arthroplasty prior to the onset of his infection.    Problem list and histories reviewed & adjusted, as indicated.  Additional history: as documented    Patient Active Problem List   Diagnosis     Right knee DJD     Alcohol abuse     Alcoholic cirrhosis of liver (H)     Chronic kidney disease, stage III (moderate)     Physical deconditioning     CAD, MI in 2007 -- S/P CABG x 3 in 2007     Prostate cancer -- S/P Radiative Seed implants in 2000 (no problems since)     Antiphospholipid Jina Syn, Hypercoag (DVT, PE) -- has IVC filt and Warfarin     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 (H)     Thoracic aortic aneurysm without rupture (H)     Chronic congestive heart failure, unspecified congestive heart failure type (H)     Abscess of Left BKA -- S/P I&D 12/1/17, MSSA     Paroxysmal a-fib (H)     Cellulitis and abscess of leg     Thrombocytopenia -- suspect related to alcohol use     Abscess     Infection of left below knee amputation (H)     Lymphedema     Anemia due to blood loss, acute     Yeast infection of the skin     Pressure ulcer, stage 2     Hyperkalemia     Confusion     Past Surgical History:   Procedure Laterality Date     AMPUTATE LEG BELOW KNEE Left 04/2014    related to gangrene, started as foot ulcer related to neuropathy     AMPUTATE LEG BELOW KNEE Left 12/4/2017    Procedure: AMPUTATE LEG BELOW KNEE;  Exploration of Left Leg Below Knee Amputation Stump with Ligation of Bleeders.;  Surgeon: Leandro Arreola MD;  Location:  OR     APPENDECTOMY       APPLY WOUND VAC Left 12/6/2017    Procedure: APPLY WOUND VAC;;  Surgeon: Saima Howard MD;  Location:  SD     ARTHROPLASTY KNEE Right  2014    Procedure: ARTHROPLASTY KNEE;  Surgeon: Saima Howard MD;  Location:  OR     CARDIAC SURGERY      CABG     CHOLECYSTECTOMY       COLONOSCOPY       CYSTOSCOPY, DILATE URETHRA, COMBINED N/A 10/12/2016    Procedure: COMBINED CYSTOSCOPY, DILATE URETHRA;  Surgeon: Dimitry Bello MD;  Location:  OR     ENDOSCOPIC STRIPPING VEIN(S)       esophagogastroduodenoscopy       EYE SURGERY      cataracts     GENITOURINARY SURGERY      Prostate Seen Implants     HERNIA REPAIR      Umbilical     INCISION AND DRAINAGE LOWER EXTREMITY, COMBINED Left 2017    Procedure: COMBINED INCISION AND DRAINAGE LOWER EXTREMITY;  INCISION AND DRAINAGE OF LEFT BELOW KNEE AMPUTATION ABSCESS, WOUND VAC PLACEMENT;  Surgeon: Saima Howard MD;  Location:  OR     IR IVC FILTER PLACEMENT       IRRIGATION AND DEBRIDEMENT LOWER EXTREMITY, COMBINED Left 2017    Procedure: COMBINED IRRIGATION AND DEBRIDEMENT LOWER EXTREMITY;  IRRIGATION AND DEBRIDEMENT OF LEFT BELOW KNEE AMPUTATION SITE WITH WOUND VAC REPLACEMENT;  Surgeon: Saima Howard MD;  Location:  SD     IRRIGATION AND DEBRIDEMENT LOWER EXTREMITY, COMBINED Left 2017    Procedure: COMBINED IRRIGATION AND DEBRIDEMENT LOWER EXTREMITY;  IRRIGATION AND DEBRIDEMENT OF LEFT BELOW THE KNEE AMPUTATION AND SHORTENING OF THE TIBIA WITH WOUND CLOSURE;  Surgeon: Saima Howard MD;  Location:  OR     ORTHOPEDIC SURGERY      (R) knee scope     ORTHOPEDIC SURGERY      total hip      ORTHOPEDIC SURGERY      elbow surgery       Social History   Substance Use Topics     Smoking status: Never Smoker     Smokeless tobacco: Never Used     Alcohol use Yes      Comment: quit 10/2015; now 2-3 Vodkas/night     Family History   Problem Relation Age of Onset     Lung Cancer Mother      Heart Failure Father       age 87         Current Outpatient Prescriptions   Medication Sig Dispense Refill     cholecalciferol (VITAMIN D3) 1000 UNIT tablet Take 1 tablet (1,000 Units) by  "mouth daily       Warfarin Sodium (COUMADIN PO) Take 3 mg by mouth daily        nystatin (MYCOSTATIN) cream Apply topically 3 times daily       lactobacillus rhamnosus, GG, (CULTURELL) capsule Take 1 capsule by mouth daily       torsemide (DEMADEX) 20 MG tablet Take 20 mg by mouth 2 times daily        ASPIRIN EC PO Take 81 mg by mouth daily       Gabapentin (NEURONTIN PO) Take 600-1,200 mg by mouth 3 times daily 600 mg in morning, 600 mg midday & 1200 mg at HS       CARVEDILOL PO Take 3.125 mg by mouth 2 times daily (with meals)        Allergies   Allergen Reactions     Hmg-Coa-R Inhibitors Other (See Comments)     Rhabdomyolysis occurred within a couple days     Ciprofloxacin Itching     Severe itching \"like ants were crawling\"         Reviewed and updated as needed this visit by clinical staff       Reviewed and updated as needed this visit by Provider         ROS:    He describes complete resolution of a sacral decubitus ulcer  Constitutional, HEENT, cardiovascular, pulmonary, gi and gu systems are negative, except as otherwise noted.      OBJECTIVE:   /67  Pulse 62  Temp 97.3  F (36.3  C) (Oral)  Ht 6' 1\" (1.854 m)  Wt 220 lb (99.8 kg)  SpO2 99%  BMI 29.03 kg/m2  Body mass index is 29.03 kg/(m^2).  GENERAL: healthy, alert and no distress  RESP: lungs clear to auscultation - no rales, rhonchi or wheezes  CV: Heart with regular rate and rhythm.   ABDOMEN: soft, nontender, no hepatosplenomegaly, no masses and bowel sounds normal  MS: There is a well-healing left stump site incision scar without evidence of infection  SKIN: no suspicious lesions or rashes  NEURO: Normal strength and tone, mentation intact and speech normal  PSYCH: mentation appears normal, affect normal/bright    Diagnostic Test Results:  Results for orders placed or performed in visit on 12/26/17   Factor 10 chromogenic   Result Value Ref Range    Chromogenic Factor 10 23 (L) 70 - 130 %       ASSESSMENT/PLAN:         ICD-10-CM    1. " Infection of left below knee amputation (H) T87.44    2. Hyperkalemia E87.5 Basic metabolic panel   3. Anemia due to blood loss, acute D62 CBC with platelets   4. Chronic congestive heart failure, unspecified congestive heart failure type (H) I50.9    5. Antiphospholipid Jina Syn, Hypercoag (DVT, PE) -- has IVC filt and Warfarin D68.61      Recheck potassium and hemoglobin.  Continue follow-up with [place] [facility] for dosing of Coumadin based on chromogenic factor X levels given history of antiphospholipid antibody syndrome.      FUTURE APPOINTMENTS:       -As symptoms dictate, and he will need to schedule preoperative evaluation for anticipation of right hip arthroplasty when he has recovered from his recent infection and surgery.    Main Hardy MD  [facility]

## 2018-01-05 NOTE — LETTER
[facility]  [address] [facility]  [address]  [place]  [address]  Tel: [phone]    January 8, 2018    Antonio Ramirez  [address]   [place] [address]        Dear Mr. Ramirez,    The following letter pertains to your most recent diagnostic tests:    -Kidney function is normal for you (Creatinine, GFR), Sodium is normal for you, Potassium is normal for you, Calcium is normal for you, Glucose (blood sugar) is elevated, but not in the diabetic range.      -Your hemoglobin is stable and appropriately improving since last check suggesting no ongoing bleeding.      Bottom line:  Labs look OK.  The potassium is not too high, and therefore, I would recommend to returning to taking a potassium supplement to avoid low potassium levels associated with taking torsemide (Demedex).  I would recommend a dose of 10 meq once daily.  I have sent an prescritpion for a potassium supplement to your pharmacy.  I recommend a follow up potassium level after you have been taking the supplement for 2 -3 weeks.  You can schedule a lab appointment for that purpose.           Follow up:  Lab appointment in 2-3 weeks for follow up potassium check    Sincerely,    Main Hardy MD/RENETTA          Enclosure: Lab Results  Results for orders placed or performed in visit on 01/05/18   Basic metabolic panel   Result Value Ref Range    Sodium 144 133 - 144 mmol/L    Potassium 3.9 3.4 - 5.3 mmol/L    Chloride 107 94 - 109 mmol/L    Carbon Dioxide 30 20 - 32 mmol/L    Anion Gap 7 3 - 14 mmol/L    Glucose 120 (H) 70 - 99 mg/dL    Urea Nitrogen 16 7 - 30 mg/dL    Creatinine 1.21 0.66 - 1.25 mg/dL    GFR Estimate 58 (L) >60 mL/min/1.7m2    GFR Estimate If Black 71 >60 mL/min/1.7m2    Calcium 8.8 8.5 - 10.1 mg/dL   CBC with platelets   Result Value Ref Range    WBC 4.6 4.0 - 11.0 10e9/L    RBC Count 3.12 (L) 4.4 - 5.9 10e12/L    Hemoglobin 9.4 (L) 13.3 - 17.7 g/dL    Hematocrit 29.5 (L) 40.0 - 53.0 %    MCV 95 78 - 100 fl    MCH 30.1 26.5 -  33.0 pg    MCHC 31.9 31.5 - 36.5 g/dL    RDW 14.7 10.0 - 15.0 %    Platelet Count 134 (L) 150 - 450 10e9/L

## 2018-01-05 NOTE — NURSING NOTE
"Chief Complaint   Patient presents with     Hospital F/U       Initial /67  Pulse 62  Temp 97.3  F (36.3  C) (Oral)  Ht 6' 1\" (1.854 m)  Wt 220 lb (99.8 kg)  SpO2 99%  BMI 29.03 kg/m2 Estimated body mass index is 29.03 kg/(m^2) as calculated from the following:    Height as of this encounter: 6' 1\" (1.854 m).    Weight as of this encounter: 220 lb (99.8 kg).  Medication Reconciliation: complete   Kimberly Smart MA  "

## 2018-01-06 LAB
ANION GAP SERPL CALCULATED.3IONS-SCNC: 7 MMOL/L (ref 3–14)
BUN SERPL-MCNC: 16 MG/DL (ref 7–30)
CALCIUM SERPL-MCNC: 8.8 MG/DL (ref 8.5–10.1)
CHLORIDE SERPL-SCNC: 107 MMOL/L (ref 94–109)
CO2 SERPL-SCNC: 30 MMOL/L (ref 20–32)
CREAT SERPL-MCNC: 1.21 MG/DL (ref 0.66–1.25)
GFR SERPL CREATININE-BSD FRML MDRD: 58 ML/MIN/1.7M2
GLUCOSE SERPL-MCNC: 120 MG/DL (ref 70–99)
POTASSIUM SERPL-SCNC: 3.9 MMOL/L (ref 3.4–5.3)
SODIUM SERPL-SCNC: 144 MMOL/L (ref 133–144)

## 2018-01-06 RX ORDER — POTASSIUM CHLORIDE 750 MG/1
10 TABLET, EXTENDED RELEASE ORAL DAILY
Qty: 90 TABLET | Refills: 3 | Status: ON HOLD | OUTPATIENT
Start: 2018-01-06 | End: 2018-04-12

## 2018-01-28 ENCOUNTER — APPOINTMENT (OUTPATIENT)
Dept: CT IMAGING | Facility: CLINIC | Age: 73
End: 2018-01-28
Attending: EMERGENCY MEDICINE
Payer: MEDICARE

## 2018-01-28 ENCOUNTER — HOSPITAL ENCOUNTER (OUTPATIENT)
Facility: CLINIC | Age: 73
Setting detail: OBSERVATION
Discharge: HOME | End: 2018-01-29
Attending: EMERGENCY MEDICINE | Admitting: INTERNAL MEDICINE
Payer: MEDICARE

## 2018-01-28 DIAGNOSIS — S01.111A EYEBROW LACERATION, RIGHT, INITIAL ENCOUNTER: ICD-10-CM

## 2018-01-28 DIAGNOSIS — F10.920 ALCOHOLIC INTOXICATION WITHOUT COMPLICATION (H): ICD-10-CM

## 2018-01-28 DIAGNOSIS — R55 SYNCOPE, UNSPECIFIED SYNCOPE TYPE: ICD-10-CM

## 2018-01-28 DIAGNOSIS — R29.898 SUSPECTED CLOSED FRACTURE OF RIB OF RIGHT SIDE: Primary | ICD-10-CM

## 2018-01-28 LAB
ALBUMIN SERPL-MCNC: 3 G/DL (ref 3.4–5)
ALP SERPL-CCNC: 96 U/L (ref 40–150)
ALT SERPL W P-5'-P-CCNC: 14 U/L (ref 0–70)
ANION GAP SERPL CALCULATED.3IONS-SCNC: 6 MMOL/L (ref 3–14)
AST SERPL W P-5'-P-CCNC: 29 U/L (ref 0–45)
BASOPHILS # BLD AUTO: 0.1 10E9/L (ref 0–0.2)
BASOPHILS NFR BLD AUTO: 1.5 %
BILIRUB DIRECT SERPL-MCNC: 0.4 MG/DL (ref 0–0.2)
BILIRUB SERPL-MCNC: 0.8 MG/DL (ref 0.2–1.3)
BUN SERPL-MCNC: 17 MG/DL (ref 7–30)
CALCIUM SERPL-MCNC: 8.4 MG/DL (ref 8.5–10.1)
CHLORIDE SERPL-SCNC: 104 MMOL/L (ref 94–109)
CO2 SERPL-SCNC: 27 MMOL/L (ref 20–32)
CREAT SERPL-MCNC: 1.24 MG/DL (ref 0.66–1.25)
DIFFERENTIAL METHOD BLD: ABNORMAL
EOSINOPHIL # BLD AUTO: 0.2 10E9/L (ref 0–0.7)
EOSINOPHIL NFR BLD AUTO: 3.9 %
ERYTHROCYTE [DISTWIDTH] IN BLOOD BY AUTOMATED COUNT: 15.1 % (ref 10–15)
ETHANOL SERPL-MCNC: 0.17 G/DL
GFR SERPL CREATININE-BSD FRML MDRD: 57 ML/MIN/1.7M2
GLUCOSE SERPL-MCNC: 96 MG/DL (ref 70–99)
HCT VFR BLD AUTO: 29.7 % (ref 40–53)
HGB BLD-MCNC: 9.5 G/DL (ref 13.3–17.7)
IMM GRANULOCYTES # BLD: 0 10E9/L (ref 0–0.4)
IMM GRANULOCYTES NFR BLD: 0.4 %
INR PPP: 3.61 (ref 0.86–1.14)
LYMPHOCYTES # BLD AUTO: 1.4 10E9/L (ref 0.8–5.3)
LYMPHOCYTES NFR BLD AUTO: 25 %
MCH RBC QN AUTO: 28 PG (ref 26.5–33)
MCHC RBC AUTO-ENTMCNC: 32 G/DL (ref 31.5–36.5)
MCV RBC AUTO: 88 FL (ref 78–100)
MONOCYTES # BLD AUTO: 0.9 10E9/L (ref 0–1.3)
MONOCYTES NFR BLD AUTO: 16.7 %
NEUTROPHILS # BLD AUTO: 2.8 10E9/L (ref 1.6–8.3)
NEUTROPHILS NFR BLD AUTO: 52.5 %
NRBC # BLD AUTO: 0 10*3/UL
NRBC BLD AUTO-RTO: 0 /100
PLATELET # BLD AUTO: 168 10E9/L (ref 150–450)
POTASSIUM SERPL-SCNC: 4.1 MMOL/L (ref 3.4–5.3)
PROT SERPL-MCNC: 7 G/DL (ref 6.8–8.8)
RBC # BLD AUTO: 3.39 10E12/L (ref 4.4–5.9)
SODIUM SERPL-SCNC: 137 MMOL/L (ref 133–144)
WBC # BLD AUTO: 5.4 10E9/L (ref 4–11)

## 2018-01-28 PROCEDURE — 80320 DRUG SCREEN QUANTALCOHOLS: CPT | Performed by: EMERGENCY MEDICINE

## 2018-01-28 PROCEDURE — 85025 COMPLETE CBC W/AUTO DIFF WBC: CPT | Performed by: EMERGENCY MEDICINE

## 2018-01-28 PROCEDURE — 99285 EMERGENCY DEPT VISIT HI MDM: CPT | Mod: 25

## 2018-01-28 PROCEDURE — 85610 PROTHROMBIN TIME: CPT | Performed by: EMERGENCY MEDICINE

## 2018-01-28 PROCEDURE — 84484 ASSAY OF TROPONIN QUANT: CPT | Performed by: EMERGENCY MEDICINE

## 2018-01-28 PROCEDURE — 80076 HEPATIC FUNCTION PANEL: CPT | Performed by: EMERGENCY MEDICINE

## 2018-01-28 PROCEDURE — 70450 CT HEAD/BRAIN W/O DYE: CPT

## 2018-01-28 PROCEDURE — 93005 ELECTROCARDIOGRAM TRACING: CPT

## 2018-01-28 PROCEDURE — 12013 RPR F/E/E/N/L/M 2.6-5.0 CM: CPT

## 2018-01-28 PROCEDURE — 80048 BASIC METABOLIC PNL TOTAL CA: CPT | Performed by: EMERGENCY MEDICINE

## 2018-01-28 ASSESSMENT — ENCOUNTER SYMPTOMS
COLOR CHANGE: 1
DIZZINESS: 0
ABDOMINAL PAIN: 0
LIGHT-HEADEDNESS: 0
BACK PAIN: 0
NECK PAIN: 0
EYE PAIN: 0
HEADACHES: 0

## 2018-01-28 NOTE — IP AVS SNAPSHOT
MRN:[id number]                      After Visit Summary   1/28/2018    Antonio Ramirez    MRN: [id number]           Thank you!     Thank you for choosing [facility] for your care. Our goal is always to provide you with excellent care. Hearing back from our patients is one way we can continue to improve our services. Please take a few minutes to complete the written survey that you may receive in the mail after you visit with us. Thank you!        Patient Information     Date Of Birth          1945        About your hospital stay     You were admitted on:  January 29, 2018 [facility]    You were discharged on:  January 29, 2018        Reason for your hospital stay       You were admitted following a fall. You have a laceration on your eyebrow that required sutures. You are to keep this clean and dry, covered with vaseline or triple antibiotic cream. You also have suspected rib fractures on your right side and have been prescribed pain medication. You cannot combine this with alcohol at home, and need to make sure you are taking plenty of deep breaths and walking regularly to prevent pneumonia.                  Who to Call     For medical emergencies, please call 911.  For non-urgent questions about your medical care, please call your primary care provider or clinic, [phone]          Attending Provider     Provider Specialty    Joseph Jernigan MD Emergency Medicine    Gomez, Von Cespedes MD Internal Medicine       Primary Care Provider Office Phone # Fax #    Main Hardy [phone]      After Care Instructions     Activity       Your activity upon discharge: activity as tolerated            Diet       Follow this diet upon discharge: Orders Placed This Encounter      Regular Diet Adult                  Follow-up Appointments     Follow-up and recommended labs and tests        Follow up with primary care provider, Main  "REGINO Hardy, within 7 days for hospital follow- up and wound check.                  Your next 10 appointments already scheduled     Feb 05, 2018  2:00 PM CST   Office Visit with Main Hardy MD   [facility] ([facility]    [address] [facility] [address] [phone]           Bring a current list of meds and any records pertaining to this visit. For Physicals, please bring immunization records and any forms needing to be filled out. Please arrive 10 minutes early to complete paperwork.              Pending Results     No orders found for last 3 day(s).            Statement of Approval     Ordered          01/29/18 1204  I have reviewed and agree with all the recommendations and orders detailed in this document.  EFFECTIVE NOW     Approved and electronically signed by:  Angus Maki PA-C             Admission Information     Date & Time Provider Department Dept. Phone    1/28/2018 Gomez, Von Cespedes MD [facility] [phone]      Your Vitals Were     Blood Pressure Pulse Temperature Respirations Height Weight    123/58 (BP Location: Left arm) 79 97.9  F (36.6  C) (Oral) 18 1.854 m (6' 0.99\") 104.7 kg (230 lb 14.4 oz)    Pulse Oximetry BMI (Body Mass Index)                94% 30.47 kg/m2          [employer] Information     [employer] gives you secure access to your electronic health record. If you see a primary care provider, you can also send messages to your care team and make appointments. If you have questions, please call your primary care clinic.  If you do not have a primary care provider, please call [phone] and they will assist you.        Care EveryWhere ID     This is your Care EveryWhere ID. This could be used by other organizations to access your [facility] medical records  [id number]        Equal Access to Services     ANTHONY DORANTES AH: Evelin Cabrera, belén grande, igor moses " ah. So [facility] [phone].    ATENCIÓN: Si aries frances, tiene a shrestha disposición servicios gratuitos de asistencia lingüística. Anabelle santana [phone].    We comply with applicable federal civil rights laws and [place] laws. We do not discriminate on the basis of race, color, national origin, age, disability, sex, sexual orientation, or gender identity.               Review of your medicines      START taking        Dose / Directions    oxyCODONE IR 5 MG tablet   Commonly known as:  ROXICODONE   Used for:  Suspected closed fracture of rib of right side        Dose:  5-10 mg   Take 1-2 tablets (5-10 mg) by mouth every 4 hours as needed for moderate to severe pain DO NOT COMBINE WITH ALCOHOL   Quantity:  18 tablet   Refills:  0         CONTINUE these medicines which have NOT CHANGED        Dose / Directions    ASPIRIN EC PO        Dose:  81 mg   Take 81 mg by mouth daily   Refills:  0       CARVEDILOL PO        Dose:  3.125 mg   Take 3.125 mg by mouth 2 times daily (with meals)   Refills:  0       COUMADIN PO        Take by mouth daily 5 mg every day except no dose on Sunday   Refills:  0       * GABAPENTIN PO        Dose:  600 mg   Take 600 mg by mouth 2 times daily AM and afternoon   Refills:  0       * GABAPENTIN PO        Dose:  1200 mg   Take 1,200 mg by mouth At Bedtime   Refills:  0       potassium chloride SA 10 MEQ CR tablet   Commonly known as:  K-DUR/KLOR-CON M   Used for:  Chronic congestive heart failure, unspecified congestive heart failure type (H)        Dose:  10 mEq   Take 1 tablet (10 mEq) by mouth daily   Quantity:  90 tablet   Refills:  3       * Notice:  This list has 2 medication(s) that are the same as other medications prescribed for you. Read the directions carefully, and ask your doctor or other care provider to review them with you.         Where to get your medicines      Some of these will need a paper prescription and others can be bought over the counter. Ask your nurse if you have  questions.     Bring a paper prescription for each of these medications     oxyCODONE IR 5 MG tablet                Protect others around you: Learn how to safely use, store and throw away your medicines at www.disposemymeds.org.        Information about OPIOIDS     PRESCRIPTION OPIOIDS: WHAT YOU NEED TO KNOW    Prescription opioids can be used to help relieve moderate to severe pain and are often prescribed following a surgery or injury, or for certain health conditions. These medications can be an important part of treatment but also come with serious risks. It is important to work with your health care provider to make sure you are getting the safest, most effective care.    WHAT ARE THE RISKS AND SIDE EFFECTS OF OPIOID USE?  Prescription opioids carry serious risks of addiction and overdose, especially with prolonged use. An opioid overdose, often marked by slowed breathing can cause sudden death. The use of prescription opioids can have a number of side effects as well, even when taken as directed:      Tolerance - meaning you might need to take more of a medication for the same pain relief    Physical dependence - meaning you have symptoms of withdrawal when a medication is stopped    Increased sensitivity to pain    Constipation    Nausea, vomiting, and dry mouth    Sleepiness and dizziness    Confusion    Depression    Low levels of testosterone that can result in lower sex drive, energy, and strength    Itching and sweating    RISKS ARE GREATER WITH:    History of drug misuse, substance use disorder, or overdose    Mental health conditions (such as depression or anxiety)    Sleep apnea    Older age (65 years or older)    Pregnancy    Avoid alcohol while taking prescription opioids.   Also, unless specifically advised by your health care provider, medications to avoid include:    Benzodiazepines (such as Xanax or Valium)    Muscle relaxants (such as Soma or Flexeril)    Hypnotics (such as Ambien or  Lunesta)    Other prescription opioids    KNOW YOUR OPTIONS:  Talk to your health care provider about ways to manage your pain that do not involve prescription opioids. Some of these options may actually work better and have fewer risks and side effects:    Pain relievers such as acetaminophen, ibuprofen, and naproxen    Some medications that are also used for depression or seizures    Physical therapy and exercise    Cognitive behavioral therapy, a psychological, goal-directed approach, in which patients learn how to modify physical, behavioral, and emotional triggers of pain and stress    IF YOU ARE PRESCRIBED OPIOIDS FOR PAIN:    Never take opioids in greater amounts or more often than prescribed    Follow up with your primary health care provider and work together to create a plan on how to manage your pain.    Talk about ways to help manage your pain that do not involve prescription opioids    Talk about all concerns and side effects    Help prevent misuse and abuse    Never sell or share prescription opioids    Never use another person's prescription opioids    Store prescription opioids in a secure place and out of reach of others (this may include visitors, children, friends, and family)    Visit www.cdc.gov/drugoverdose to learn about risks of opioid abuse and overdose    If you believe you may be struggling with addiction, tell your health care provider and ask for guidance or call [facility]'s National Helpline at 1-309-310-HELP    LEARN MORE / www.cdc.gov/drugoverdose/prescribing/guideline.html    Safely dispose of unused prescription opioids: Find your local drug take-back programs and more information about the importance of safe disposal at www.doseofreality.mn.gov             Medication List: This is a list of all your medications and when to take them. Check marks below indicate your daily home schedule. Keep this list as a reference.      Medications           Morning Afternoon Evening Bedtime As  Needed    ASPIRIN EC PO   Take 81 mg by mouth daily   Last time this was given:  81 mg on 1/29/2018  9:06 AM                                   CARVEDILOL PO   Take 3.125 mg by mouth 2 times daily (with meals)   Last time this was given:  3.125 mg on 1/29/2018  9:06 AM                                      COUMADIN PO   Take by mouth daily 5 mg every day except no dose on Sunday                                   * GABAPENTIN PO   Take 600 mg by mouth 2 times daily AM and afternoon   Last time this was given:  600 mg on 1/29/2018  9:06 AM                                      * GABAPENTIN PO   Take 1,200 mg by mouth At Bedtime   Last time this was given:  600 mg on 1/29/2018  9:06 AM                                   oxyCODONE IR 5 MG tablet   Commonly known as:  ROXICODONE   Take 1-2 tablets (5-10 mg) by mouth every 4 hours as needed for moderate to severe pain DO NOT COMBINE WITH ALCOHOL   Last time this was given:  5 mg on 1/29/2018 12:24 PM                                   potassium chloride SA 10 MEQ CR tablet   Commonly known as:  K-DUR/KLOR-CON M   Take 1 tablet (10 mEq) by mouth daily                                   * Notice:  This list has 2 medication(s) that are the same as other medications prescribed for you. Read the directions carefully, and ask your doctor or other care provider to review them with you.

## 2018-01-28 NOTE — IP AVS SNAPSHOT
[facility]    [address] [place] [id number]    Phone:  [phone]                                       After Visit Summary   1/28/2018    Antonio Ramirez    MRN: [id number]           After Visit Summary Signature Page     I have received my discharge instructions, and my questions have been answered. I have discussed any challenges I see with this plan with the nurse or doctor.    ..........................................................................................................................................  Patient/Patient Representative Signature      ..........................................................................................................................................  Patient Representative Print Name and Relationship to Patient    ..................................................               ................................................  Date                                            Time    ..........................................................................................................................................  Reviewed by Signature/Title    ...................................................              ..............................................  Date                                                            Time

## 2018-01-29 ENCOUNTER — APPOINTMENT (OUTPATIENT)
Dept: CARDIOLOGY | Facility: CLINIC | Age: 73
End: 2018-01-29
Attending: INTERNAL MEDICINE
Payer: MEDICARE

## 2018-01-29 ENCOUNTER — APPOINTMENT (OUTPATIENT)
Dept: PHYSICAL THERAPY | Facility: CLINIC | Age: 73
End: 2018-01-29
Attending: PHYSICIAN ASSISTANT
Payer: MEDICARE

## 2018-01-29 VITALS
HEIGHT: 73 IN | SYSTOLIC BLOOD PRESSURE: 123 MMHG | WEIGHT: 230.9 LBS | DIASTOLIC BLOOD PRESSURE: 58 MMHG | TEMPERATURE: 97.9 F | OXYGEN SATURATION: 94 % | RESPIRATION RATE: 18 BRPM | HEART RATE: 79 BPM | BODY MASS INDEX: 30.6 KG/M2
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
## 2018-01-29 LAB
FACT X ACT/NOR PPP CHRO: 20 % (ref 70–130)
INTERPRETATION ECG - MUSE: NORMAL
TROPONIN I SERPL-MCNC: 0.02 UG/L (ref 0–0.04)
TROPONIN I SERPL-MCNC: 0.02 UG/L (ref 0–0.04)
TROPONIN I SERPL-MCNC: <0.015 UG/L (ref 0–0.04)

## 2018-01-29 PROCEDURE — 85260 CLOT FACTOR X STUART-POWER: CPT | Performed by: INTERNAL MEDICINE

## 2018-01-29 PROCEDURE — 99207 ZZC CDG-CODE CATEGORY CHANGED: CPT | Performed by: INTERNAL MEDICINE

## 2018-01-29 PROCEDURE — 84484 ASSAY OF TROPONIN QUANT: CPT | Performed by: INTERNAL MEDICINE

## 2018-01-29 PROCEDURE — 25000132 ZZH RX MED GY IP 250 OP 250 PS 637: Mod: GY | Performed by: INTERNAL MEDICINE

## 2018-01-29 PROCEDURE — 93306 TTE W/DOPPLER COMPLETE: CPT | Mod: 26 | Performed by: INTERNAL MEDICINE

## 2018-01-29 PROCEDURE — 97161 PT EVAL LOW COMPLEX 20 MIN: CPT | Mod: GP

## 2018-01-29 PROCEDURE — 25000132 ZZH RX MED GY IP 250 OP 250 PS 637: Mod: GY | Performed by: PHYSICIAN ASSISTANT

## 2018-01-29 PROCEDURE — 40000193 ZZH STATISTIC PT WARD VISIT

## 2018-01-29 PROCEDURE — 99236 HOSP IP/OBS SAME DATE HI 85: CPT | Performed by: INTERNAL MEDICINE

## 2018-01-29 PROCEDURE — 93306 TTE W/DOPPLER COMPLETE: CPT

## 2018-01-29 PROCEDURE — 36415 COLL VENOUS BLD VENIPUNCTURE: CPT | Performed by: INTERNAL MEDICINE

## 2018-01-29 PROCEDURE — A9270 NON-COVERED ITEM OR SERVICE: HCPCS | Mod: GY | Performed by: INTERNAL MEDICINE

## 2018-01-29 PROCEDURE — G0378 HOSPITAL OBSERVATION PER HR: HCPCS

## 2018-01-29 PROCEDURE — 25500064 ZZH RX 255 OP 636: Performed by: INTERNAL MEDICINE

## 2018-01-29 PROCEDURE — 99207 ZZC APP CREDIT; MD BILLING SHARED VISIT: CPT | Performed by: PHYSICIAN ASSISTANT

## 2018-01-29 RX ORDER — NALOXONE HYDROCHLORIDE 0.4 MG/ML
.1-.4 INJECTION, SOLUTION INTRAMUSCULAR; INTRAVENOUS; SUBCUTANEOUS
Status: DISCONTINUED | OUTPATIENT
Start: 2018-01-29 | End: 2018-01-29 | Stop reason: HOSPADM

## 2018-01-29 RX ORDER — ONDANSETRON 4 MG/1
4 TABLET, ORALLY DISINTEGRATING ORAL EVERY 6 HOURS PRN
Status: DISCONTINUED | OUTPATIENT
Start: 2018-01-29 | End: 2018-01-29 | Stop reason: HOSPADM

## 2018-01-29 RX ORDER — PROCHLORPERAZINE 25 MG
12.5 SUPPOSITORY, RECTAL RECTAL EVERY 12 HOURS PRN
Status: DISCONTINUED | OUTPATIENT
Start: 2018-01-29 | End: 2018-01-29 | Stop reason: HOSPADM

## 2018-01-29 RX ORDER — LIDOCAINE 40 MG/G
CREAM TOPICAL
Status: DISCONTINUED | OUTPATIENT
Start: 2018-01-29 | End: 2018-01-29 | Stop reason: HOSPADM

## 2018-01-29 RX ORDER — POLYETHYLENE GLYCOL 3350 17 G/17G
17 POWDER, FOR SOLUTION ORAL DAILY PRN
Status: DISCONTINUED | OUTPATIENT
Start: 2018-01-29 | End: 2018-01-29 | Stop reason: HOSPADM

## 2018-01-29 RX ORDER — BISACODYL 10 MG
10 SUPPOSITORY, RECTAL RECTAL DAILY PRN
Status: DISCONTINUED | OUTPATIENT
Start: 2018-01-29 | End: 2018-01-29 | Stop reason: HOSPADM

## 2018-01-29 RX ORDER — LIDOCAINE 4 G/G
1 PATCH TOPICAL
Status: DISCONTINUED | OUTPATIENT
Start: 2018-01-29 | End: 2018-01-29 | Stop reason: HOSPADM

## 2018-01-29 RX ORDER — WARFARIN SODIUM 5 MG/1
5 TABLET ORAL
Status: DISCONTINUED | OUTPATIENT
Start: 2018-01-29 | End: 2018-01-29 | Stop reason: HOSPADM

## 2018-01-29 RX ORDER — GABAPENTIN 600 MG/1
600 TABLET ORAL 2 TIMES DAILY
Status: DISCONTINUED | OUTPATIENT
Start: 2018-01-29 | End: 2018-01-29 | Stop reason: HOSPADM

## 2018-01-29 RX ORDER — OXYCODONE HYDROCHLORIDE 5 MG/1
5-10 TABLET ORAL EVERY 4 HOURS PRN
Qty: 18 TABLET | Refills: 0 | Status: SHIPPED | OUTPATIENT
Start: 2018-01-29 | End: 2018-02-05

## 2018-01-29 RX ORDER — AMOXICILLIN 250 MG
1 CAPSULE ORAL 2 TIMES DAILY PRN
Status: DISCONTINUED | OUTPATIENT
Start: 2018-01-29 | End: 2018-01-29 | Stop reason: HOSPADM

## 2018-01-29 RX ORDER — ASPIRIN 81 MG/1
81 TABLET ORAL DAILY
Status: DISCONTINUED | OUTPATIENT
Start: 2018-01-29 | End: 2018-01-29 | Stop reason: HOSPADM

## 2018-01-29 RX ORDER — GABAPENTIN 600 MG/1
600 TABLET ORAL 3 TIMES DAILY
Status: DISCONTINUED | OUTPATIENT
Start: 2018-01-29 | End: 2018-01-29

## 2018-01-29 RX ORDER — CARVEDILOL 3.12 MG/1
3.12 TABLET ORAL 2 TIMES DAILY WITH MEALS
Status: DISCONTINUED | OUTPATIENT
Start: 2018-01-29 | End: 2018-01-29 | Stop reason: HOSPADM

## 2018-01-29 RX ORDER — GABAPENTIN 600 MG/1
1200 TABLET ORAL AT BEDTIME
Status: DISCONTINUED | OUTPATIENT
Start: 2018-01-29 | End: 2018-01-29 | Stop reason: HOSPADM

## 2018-01-29 RX ORDER — ACETAMINOPHEN 325 MG/1
975 TABLET ORAL 3 TIMES DAILY
Status: DISCONTINUED | OUTPATIENT
Start: 2018-01-29 | End: 2018-01-29 | Stop reason: HOSPADM

## 2018-01-29 RX ORDER — AMOXICILLIN 250 MG
2 CAPSULE ORAL 2 TIMES DAILY PRN
Status: DISCONTINUED | OUTPATIENT
Start: 2018-01-29 | End: 2018-01-29 | Stop reason: HOSPADM

## 2018-01-29 RX ORDER — ACETAMINOPHEN 325 MG/1
650 TABLET ORAL EVERY 4 HOURS PRN
Status: DISCONTINUED | OUTPATIENT
Start: 2018-01-29 | End: 2018-01-29

## 2018-01-29 RX ORDER — ONDANSETRON 2 MG/ML
4 INJECTION INTRAMUSCULAR; INTRAVENOUS EVERY 6 HOURS PRN
Status: DISCONTINUED | OUTPATIENT
Start: 2018-01-29 | End: 2018-01-29 | Stop reason: HOSPADM

## 2018-01-29 RX ORDER — GABAPENTIN 600 MG/1
600 TABLET ORAL AT BEDTIME
Status: DISCONTINUED | OUTPATIENT
Start: 2018-01-29 | End: 2018-01-29

## 2018-01-29 RX ORDER — ACETAMINOPHEN 650 MG/1
650 SUPPOSITORY RECTAL EVERY 4 HOURS PRN
Status: DISCONTINUED | OUTPATIENT
Start: 2018-01-29 | End: 2018-01-29

## 2018-01-29 RX ORDER — OXYCODONE HYDROCHLORIDE 5 MG/1
5 TABLET ORAL
Status: DISCONTINUED | OUTPATIENT
Start: 2018-01-29 | End: 2018-01-29 | Stop reason: HOSPADM

## 2018-01-29 RX ORDER — PROCHLORPERAZINE MALEATE 5 MG
5 TABLET ORAL EVERY 6 HOURS PRN
Status: DISCONTINUED | OUTPATIENT
Start: 2018-01-29 | End: 2018-01-29 | Stop reason: HOSPADM

## 2018-01-29 RX ADMIN — GABAPENTIN 600 MG: 600 TABLET, FILM COATED ORAL at 13:40

## 2018-01-29 RX ADMIN — SULFUR HEXAFLUORIDE 5 ML: KIT at 08:27

## 2018-01-29 RX ADMIN — LIDOCAINE 1 PATCH: 560 PATCH PERCUTANEOUS; TOPICAL; TRANSDERMAL at 11:37

## 2018-01-29 RX ADMIN — OXYCODONE HYDROCHLORIDE 5 MG: 5 TABLET ORAL at 09:07

## 2018-01-29 RX ADMIN — CARVEDILOL 3.12 MG: 3.12 TABLET, FILM COATED ORAL at 09:06

## 2018-01-29 RX ADMIN — ASPIRIN 81 MG: 81 TABLET, COATED ORAL at 09:06

## 2018-01-29 RX ADMIN — OXYCODONE HYDROCHLORIDE 5 MG: 5 TABLET ORAL at 02:21

## 2018-01-29 RX ADMIN — OXYCODONE HYDROCHLORIDE 5 MG: 5 TABLET ORAL at 12:24

## 2018-01-29 RX ADMIN — GABAPENTIN 600 MG: 600 TABLET, FILM COATED ORAL at 09:06

## 2018-01-29 NOTE — DISCHARGE SUMMARY
[facility]    Discharge Summary  Hospitalist    Date of Admission:  1/28/2018  Date of Discharge:  1/29/2018  Discharging Provider: Angus Maki PA-C    Discharge Diagnoses      Syncope, unspecified syncope type  Alcoholic intoxication without complication (H)  Eyebrow laceration, right, initial encounter  Suspected closed fracture of rib of right side    History of Present Illness   Antonio Ramirez is an 72 year old male who presented following a fall.    HPI from admission H&P:  Antonio Ramirez is a 72 year old male who presents with a fall.  Patient fell when attempting to go to the bathroom, there was limited in his ability to provide a history secondary to memory impairment.  Unclear if this represents postconcussive syndrome.  Primary suspicion is for underlying cognitive impairment.  patient recalls last getting off of the couch, does not recall falling, fell in his bathroom.  Was wearing his prosthesis, which is new and he has only been wearing for a few hours per day over the past 2 days.  Prosthesis had a sock on the foot, though no shoe.  Suspect that this contributed to fall on a tile surface.  Wife heard patient fall, subsequently presents to the emergency department.  Patient is anticoagulated chronically for antiphospholipid antibody syndrome and history of clots with IVC filter in place.  CT of head negative for bleed, though substantial periorbital ecchymosis on right.  Patient is largely pain-free, frustrated with recommendation for evaluation for possible syncope.  As noted above, unclear if this actually represents syncope.  Patient does have a history of aortic stenosis which is mild as well as mitral regurgitation.  History of coronary artery disease without significant history of dysrhythmia.     The patient was initially hesitant for observation admission, discussed further recommendations to rule out life-threatening dysrhythmia or aortic stenosis.  Patient amenable to  this.     Had been feeling in his usual state of health with no fevers or chills.  Endorse drinking 2 alcoholic drinks that evening, typically drinks 3 alcoholic drinks.  Has a history of alcoholic liver disease, though no prior history of withdrawal per report or review of recent hospitalization.    Hospital Course   Antonio Ramirez was admitted on 1/28/2018.  The following problems were addressed during his hospitalization:    Status-post Mechanical Fall  Right eyebrow laceration s/p repair  Right orbit contusion  Right subconjunctival hematoma  Acute alcohol intoxication, ongoing alcohol abuse  Patient presented as a result from fall in setting of alcohol use (admission ETOH 0.17) and having received a new prosthesis 3 days prior. Patient notes baseline unsteadiness which has worsened with adjusting to his new prosthetic leg. Reports he solely had a sock on his prosthetic rather than a shoe. He has been using a cane for assistance ambulating at home with new prosthesis, typically ambulates without requiring any assistance. On admission there was some question of patient's mentation and possible cognitive impairment on admission as he did not recall the fall nor significant recent admission during month prior. On repeat evaluation, patient is able to recall events of fall in detail as well as recent admission; strongly suspect pt's memory lapse to be 2/2 acute alcohol intoxication. He was admitted under observation status, monitored on telemetry without arrhythmia noted. TTE 1/29 with EF 40-45%, WMAs in septal, anterior, and apical distributions with elevated RV systolic pressure and mod MR. All findings were stable when compared to most recent recorded via [facility] system in 05/2017. Patient felt comfortable discharging home. He was evaluated by physical therapy for home safety and cleared to return without requiring increased assistance. Arrangements were made for PCP follow-up in one week for suture removal,  wound check, and post-hospital visit.    Suspected Right-sided rib fracture   Morning of discharge patient complaining of ongoing right-sided chest wall pain worsened with inspiration. Discussed possibility of rib fractures as patient fell onto right side with associated right facial trauma noted above. Recommended evaluation with CXR to definitively evaluate, however patient declined. As pt was not requiring supplemental oxygen, breathing was nonlabored, and without signs of distress CXR deferred. He was treated with scheduled tylenol and oxycodone PRN with improvement in pain. Reiterated to patient multiple times he cannot combine narcotic pain medications with alcohol consumption.     High risk alcohol use: No prior history of alcohol withdrawal. Had been drinking alcohol prior to fall, admits to drinking 3 alcoholic beverages daily. Strongly encouraged cessation given fall, also instructed to refrain from consuming alcohol while taking narcotic pain medications.    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          Code Status   Full Code       Primary Care Physician   Main Hardy    Physical Exam   Temp: 97.9  F (36.6  C) Temp src: Oral BP: 123/58 Pulse: 79 Heart Rate: 60 Resp: 18 SpO2: 94 % O2 Device: None (Room air)    Vitals:    01/28/18 2242 01/29/18 0210   Weight: 102.1 kg (225 lb) 104.7 kg (230 lb 14.4 oz)     Vital Signs with Ranges  Temp:  [97.8  F (36.6  C)-98.4  F (36.9  C)] 97.9  F (36.6  C)  Pulse:  [79] 79  Heart Rate:  [51-74] 60  Resp:  [18] 18  BP:  (123-134)/(58-78) 123/58  SpO2:  [94 %-95 %] 94 %  I/O last 3 completed shifts:  In: 100 [P.O.:100]  Out: -     GEN: well-developed, well-nourished, appears comfortable  HEENT: PERRLA, subconjunctival hemorhage in right eye, EOMs intact bilaterally, contusion to right orbit, midface nontender to palpation, no trismus, posterior oropharynx patent, uvula midline, mmm  PULM: lungs CTA bilaterally, no increased work of breathing, no wheeze, rales, rhonchi; no ecchymosis or contusion to chest wall, chest wall tender to palpation overlying right anterolateral wall  CV: RRR, S1 & S2, holosystolic murmur present   GI: soft, nontender, nondistended, no guarding or rigidity, +BS in all 4 quadrants  SKIN: warm & dry without rash; right eyebrow laceration with sutures in place, no drainage or dehiscence    Discharge Disposition   Discharged to home  Condition at discharge: Stable    Consultations This Hospital Stay   PHARMACY TO DOSE WARFARIN  PHYSICAL THERAPY ADULT IP CONSULT    Time Spent on this Encounter   I, Angus Maki, personally saw the patient today and spent less than or equal to 30 minutes discharging this patient.    Discharge Orders     Reason for your hospital stay   You were admitted following a fall. You have a laceration on your eyebrow that required sutures. You are to keep this clean and dry, covered with vaseline or triple antibiotic cream. You also have suspected rib fractures on your right side and have been prescribed pain medication. You cannot combine this with alcohol at home, and need to make sure you are taking plenty of deep breaths and walking regularly to prevent pneumonia.     Follow-up and recommended labs and tests    Follow up with primary care provider, Main Hardy, within 7 days for hospital follow- up and wound check.     Activity   Your activity upon discharge: activity as tolerated     Diet   Follow this diet upon discharge: Orders Placed This Encounter     Regular Diet Adult  "      Discharge Medications   Current Discharge Medication List      START taking these medications    Details   oxyCODONE IR (ROXICODONE) 5 MG tablet Take 1-2 tablets (5-10 mg) by mouth every 4 hours as needed for moderate to severe pain DO NOT COMBINE WITH ALCOHOL  Qty: 18 tablet, Refills: 0    Associated Diagnoses: Suspected closed fracture of rib of right side         CONTINUE these medications which have NOT CHANGED    Details   !! GABAPENTIN PO Take 600 mg by mouth 2 times daily AM and afternoon      !! GABAPENTIN PO Take 1,200 mg by mouth At Bedtime      potassium chloride SA (K-DUR/KLOR-CON M) 10 MEQ CR tablet Take 1 tablet (10 mEq) by mouth daily  Qty: 90 tablet, Refills: 3    Associated Diagnoses: Chronic congestive heart failure, unspecified congestive heart failure type (H)      Warfarin Sodium (COUMADIN PO) Take by mouth daily 5 mg every day except no dose on Sunday      ASPIRIN EC PO Take 81 mg by mouth daily      CARVEDILOL PO Take 3.125 mg by mouth 2 times daily (with meals)        !! - Potential duplicate medications found. Please discuss with provider.        Allergies   Allergies   Allergen Reactions     Hmg-Coa-R Inhibitors Other (See Comments)     Rhabdomyolysis occurred within a couple days     Ciprofloxacin Itching     Severe itching \"like ants were crawling\"     Data   Most Recent 3 CBC's:  Recent Labs   Lab Test  01/28/18 2247 01/05/18   1503  12/22/17   0823   12/18/17   0835   WBC  5.4  4.6   --    --   6.9   HGB  9.5*  9.4*  8.7*   < >  8.0*   MCV  88  95   --    --   95   PLT  168  134*   --    --   168    < > = values in this interval not displayed.      Most Recent 3 BMP's:  Recent Labs   Lab Test  01/28/18 2247 01/05/18   1503  12/22/17   0823   NA  137  144  140   POTASSIUM  4.1  3.9  3.9   CHLORIDE  104  107  106   CO2  27  30  25   BUN  17  16  18   CR  1.24  1.21  1.23   ANIONGAP  6  7  9   BENNIE  8.4*  8.8  8.4*   GLC  96  120*  83     Most Recent 2 LFT's:  Recent Labs   Lab " Test  01/28/18   2247  12/15/17   1235   AST  29  23   ALT  14  6   ALKPHOS  96  115   BILITOTAL  0.8  0.7     Most Recent INR's and Anticoagulation Dosing History:  Anticoagulation Dose History     Recent Dosing and Labs Latest Ref Rng & Units 12/8/2017 12/9/2017 12/10/2017 12/15/2017 12/26/2017 1/28/2018 1/29/2018    Warfarin 5 mg - - 5 mg - - - - -    Warfarin 7.5 mg - - 7.5 mg - - - - -    INR 0.86 - 1.14 1.25(H) 1.16(H) 1.39(H) 2.67(H) - 3.61(H) -    Chromogenic Factor 10 70 - 130 % - - - - 23(L) - 20(L)        Most Recent 3 Troponin's:  Recent Labs   Lab Test  01/29/18   0640  01/29/18   0310  01/28/18   2247   TROPI  <0.015  0.017  0.016     Most Recent Cholesterol Panel:No lab results found.  Most Recent 6 Bacteria Isolates From Any Culture (See EPIC Reports for Culture Details):  Recent Labs   Lab Test  12/06/17   1159  12/04/17   0801  12/01/17   1756  11/29/17   1811  11/29/17   1649   CULT  No anaerobes isolated  No growth  Culture negative after 4 weeks  No anaerobes isolated  No growth  No anaerobes isolated  Light growth  Staphylococcus aureus  This isolate is presumed to be clindamycin resistant based on detection of inducible   clindamycin resistance. Erythromycin and clindamycin are resistant, therefore, they are   not recommended for use.  *  No growth  No growth     Most Recent TSH, T4 and A1c Labs:No lab results found.  Results for orders placed or performed during the hospital encounter of 01/28/18   Head CT w/o contrast    Narrative    CT SCAN OF THE HEAD WITHOUT CONTRAST   1/28/2018 11:14 PM     HISTORY: Unwitnessed fall, +LOC, coumadin use.     TECHNIQUE:  Axial images of the head and coronal reformations without  IV contrast material. Radiation dose for this scan was reduced using  automated exposure control, adjustment of the mA and/or kV according  to patient size, or iterative reconstruction technique.    COMPARISON: None.    FINDINGS:  There is generalized atrophy of the brain.   There is low  attenuation in the white matter of the cerebral hemispheres consistent  with sequelae of small vessel ischemic disease. There is no evidence  of intracranial hemorrhage, mass, acute infarct or anomaly.     Mild mucosal thickening in the right maxillary sinus. There is no  evidence of trauma.       Impression    IMPRESSION:  No acute abnormality.      MALCOM SANTOS MD

## 2018-01-29 NOTE — PROGRESS NOTES
RECEIVING UNIT ED HANDOFF REVIEW    ED Nurse Handoff Report was reviewed by: Shruthi Flores on January 29, 2018 at 1:25 AM

## 2018-01-29 NOTE — H&P
[facility]    History and Physical  Hospitalist       Date of Admission:  1/28/2018    Assessment & Plan   Antonio Ramirez is a 72 year old male who presents with a fall at home.    Fall, possible syncope: Primary suspicion is for a mechanical fall in the setting of patient with a new prosthetic leg, wearing a sock on his prosthesis without a shoe, and alcohol intoxication.  Patient does not recall his fall, though in further discussion with patient, he does not actually recall recent substantial events either.  For example, I encountered this patient 12/4/17 when he was experiencing significant bleeding from his left lower extremity.  Arterial bleeding noted at that time and I was at patient's bedside for nearly 3 hours holding direct pressure to his limb and discussing with him and his wife the need for operative intervention, working to coordinate surgical services.  Patient was alert and conversant throughout this experience.  Patient recalls none of this, though states that his wife has described these events to him in the past as well.  As patient states he has not recalled this in the past, I am more inclined to believe that patient has some cognitive/memory impairment rather than postconcussive syndrome, though this is certainly a possibility as well.  -Ambulate with nursing staff.  -Orthostatic blood pressure and pulse daily and after fluid boluses  -TTE pending given patient's history of moderate to severe mitral regurgitation and mild aortic stenosis  -Cardiac telemetry to rule out dysrhythmia  -Monitor for neurologic changes in the setting of fall with facial trauma on anticoagulation.  Head CT negative for bleed in the emergency department.  -Holding prior to admission torsemide  -Recommend alcohol cessation      Chronic medical issues:    Left lower extremity BKA: BKA in 2014.  Recently admitted for abscess of stump early December after he was fitted for a tighter prosthesis sleeve.   Recently initiated on new prosthesis, which he has been using a few hours per day.  Suspect new prosthesis resulted in fall while ambulating to the bathroom.    Mild aortic stenosis,  Moderate mitral regurgitation:  3+ mitral regurgitation noted in May 2017.   -TTE pending, though low suspicion that valvulopathy resulted in fall as above.      Antiphospholipid antibody syndrome: History of recurrent blood clots.  -Continue warfarin, pharmacy to dose based on chromogenic 10.    Coronary artery disease:  -Continue carvedilol 3.125 twice daily  -Continue aspirin 81 mg daily  -Continue warfarin, pharmacy to dose    Alcoholic liver disease ongoing High risk alcohol use: No prior history of alcohol withdrawal. Had been drinking alcohol prior to fall tonight.  Describes drinking approximately 3 drinks per evening.  Potentially contributed to orthostatic hypotension.  -Recommend cessation    DVT Prophylaxis: Warfarin    Code Status: Full Code    Disposition: Expected discharge likely 1/29/18 following TTE results.    Von Cespedes Jackson    Primary Care Physician   [facility]    Chief Complaint   Fall    History is obtained from the patient, chart review, discussion with Dr. Jernigan in the ED, review of outside records including historical TTE.    History of Present Illness   Antonio Ramirez is a 72 year old male who presents with a fall.  Patient fell when attempting to go to the bathroom, there was limited in his ability to provide a history secondary to memory impairment.  Unclear if this represents postconcussive syndrome.  Primary suspicion is for underlying cognitive impairment.  patient recalls last getting off of the couch, does not recall falling, fell in his bathroom.  Was wearing his prosthesis, which is new and he has only been wearing for a few hours per day over the past 2 days.  Prosthesis had a sock on the foot, though no shoe.  Suspect that this contributed to fall on a tile surface.  Wife heard patient  fall, subsequently presents to the emergency department.  Patient is anticoagulated chronically for antiphospholipid antibody syndrome and history of clots with IVC filter in place.  CT of head negative for bleed, though substantial periorbital ecchymosis on right.  Patient is largely pain-free, frustrated with recommendation for evaluation for possible syncope.  As noted above, unclear if this actually represents syncope.  Patient does have a history of aortic stenosis which is mild as well as mitral regurgitation.  History of coronary artery disease without significant history of dysrhythmia.    The patient was initially hesitant for observation admission, discussed further recommendations to rule out life-threatening dysrhythmia or aortic stenosis.  Patient amenable to this.    Had been feeling in his usual state of health with no fevers or chills.  Endorse drinking 2 alcoholic drinks that evening, typically drinks 3 alcoholic drinks.  Has a history of alcoholic liver disease, though no prior history of withdrawal per report or review of recent hospitalization.    Past Medical History    I have reviewed this patient's medical history and updated it with pertinent information if needed.   Past Medical History:   Diagnosis Date     Alcoholism (H)      Amputated left leg (H)      Anemia      Anticardiolipin antibody syndrome (H)      Antiplatelet or antithrombotic long-term use      Aortic stenosis      Arthritis      Balance problem      Cardiomyopathy (H)      Coagulation disorder (H)     cardiolipinantibody syndrome     Coronary artery disease      Gastro-oesophageal reflux disease      Heart attack 2007     Hiatal hernia      Hypercholesterolemia      Hypertension      Left foot drop      Liver disease     alcoholic liver disease, alcoholic cirrohsosis, portal hypertension     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)     ?When diagnosed, patient not aware of this     Moderate mitral regurgitation       Monoclonal gammopathy      Neuropathy      Noninfectious ileitis     diverticulitis of colon     PE (pulmonary embolism) 2006     Prostate cancer (H) 2000    Treated with radiation (seed implants in 2000) -- no problems since     Renal disease     kidney stones     Syncope      Thoracic aortic aneurysm, without rupture      Thrombocytopenia (H)      Thrombosis of leg      Urethral stricture        Past Surgical History   I have reviewed this patient's surgical history and updated it with pertinent information if needed.  Past Surgical History:   Procedure Laterality Date     AMPUTATE LEG BELOW KNEE Left 04/2014    related to gangrene, started as foot ulcer related to neuropathy     AMPUTATE LEG BELOW KNEE Left 12/4/2017    Procedure: AMPUTATE LEG BELOW KNEE;  Exploration of Left Leg Below Knee Amputation Stump with Ligation of Bleeders.;  Surgeon: Leandro Arreola MD;  Location:  OR     APPENDECTOMY       APPLY WOUND VAC Left 12/6/2017    Procedure: APPLY WOUND VAC;;  Surgeon: Saima Howard MD;  Location:  SD     ARTHROPLASTY KNEE Right 9/19/2014    Procedure: ARTHROPLASTY KNEE;  Surgeon: Saima Howard MD;  Location:  OR     CARDIAC SURGERY      CABG     CHOLECYSTECTOMY       COLONOSCOPY       CYSTOSCOPY, DILATE URETHRA, COMBINED N/A 10/12/2016    Procedure: COMBINED CYSTOSCOPY, DILATE URETHRA;  Surgeon: Dimitry Bello MD;  Location:  OR     ENDOSCOPIC STRIPPING VEIN(S)       esophagogastroduodenoscopy       EYE SURGERY      cataracts     GENITOURINARY SURGERY      Prostate Seen Implants     HERNIA REPAIR      Umbilical     INCISION AND DRAINAGE LOWER EXTREMITY, COMBINED Left 12/1/2017    Procedure: COMBINED INCISION AND DRAINAGE LOWER EXTREMITY;  INCISION AND DRAINAGE OF LEFT BELOW KNEE AMPUTATION ABSCESS, WOUND VAC PLACEMENT;  Surgeon: Saima Howard MD;  Location:  OR     IR IVC FILTER PLACEMENT       IRRIGATION AND DEBRIDEMENT LOWER EXTREMITY, COMBINED Left 12/6/2017    Procedure:  "COMBINED IRRIGATION AND DEBRIDEMENT LOWER EXTREMITY;  IRRIGATION AND DEBRIDEMENT OF LEFT BELOW KNEE AMPUTATION SITE WITH WOUND VAC REPLACEMENT;  Surgeon: Saima Howard MD;  Location:  SD     IRRIGATION AND DEBRIDEMENT LOWER EXTREMITY, COMBINED Left 12/8/2017    Procedure: COMBINED IRRIGATION AND DEBRIDEMENT LOWER EXTREMITY;  IRRIGATION AND DEBRIDEMENT OF LEFT BELOW THE KNEE AMPUTATION AND SHORTENING OF THE TIBIA WITH WOUND CLOSURE;  Surgeon: Saima Howard MD;  Location:  OR     ORTHOPEDIC SURGERY      (R) knee scope     ORTHOPEDIC SURGERY      total hip      ORTHOPEDIC SURGERY      elbow surgery       Prior to Admission Medications   Prior to Admission Medications   Prescriptions Last Dose Informant Patient Reported? Taking?   ASPIRIN EC PO   Yes No   Sig: Take 81 mg by mouth daily   CARVEDILOL PO   Yes No   Sig: Take 3.125 mg by mouth 2 times daily (with meals)    Gabapentin (NEURONTIN PO)   Yes No   Sig: Take 600-1,200 mg by mouth 3 times daily 600 mg in morning, 600 mg midday & 1200 mg at HS   Warfarin Sodium (COUMADIN PO)   Yes No   Sig: Take 3 mg by mouth daily    amoxicillin (AMOXIL) 500 MG capsule   Yes No   Sig: Take 4 capsules (2,000 mg) by mouth once for 1 dose   cholecalciferol (VITAMIN D3) 1000 UNIT tablet   Yes No   Sig: Take 1 tablet (1,000 Units) by mouth daily   lactobacillus rhamnosus, GG, (CULTURELL) capsule   No No   Sig: Take 1 capsule by mouth daily   nystatin (MYCOSTATIN) cream   Yes No   Sig: Apply topically 3 times daily   potassium chloride SA (K-DUR/KLOR-CON M) 10 MEQ CR tablet   No No   Sig: Take 1 tablet (10 mEq) by mouth daily   torsemide (DEMADEX) 20 MG tablet   Yes No   Sig: Take 20 mg by mouth 2 times daily       Facility-Administered Medications: None     Allergies   Allergies   Allergen Reactions     Hmg-Coa-R Inhibitors Other (See Comments)     Rhabdomyolysis occurred within a couple days     Ciprofloxacin Itching     Severe itching \"like ants were crawling\" "       Social History   I have reviewed this patient's social history and updated it with pertinent information if needed. Antonio Ramirez  reports that he has never smoked. He has never used smokeless tobacco. He reports that he drinks alcohol. He reports that he does not use illicit drugs.    Family History   I have reviewed this patient's family history and updated it with pertinent information if needed.   Family History   Problem Relation Age of Onset     Lung Cancer Mother      Heart Failure Father       age 87       Review of Systems   The 10 point Review of Systems is negative other than noted in the HPI or here.  No headache    Physical Exam   Temp: 97.8  F (36.6  C) Temp src: Oral BP: 128/64 Pulse: 79 Heart Rate: 74 Resp: 18 SpO2: 95 % O2 Device: None (Room air)    Vital Signs with Ranges  Temp:  [97.8  F (36.6  C)-98.4  F (36.9  C)] 97.8  F (36.6  C)  Pulse:  [79] 79  Heart Rate:  [72-74] 74  Resp:  [18] 18  BP: (126-134)/(64-78) 128/64  SpO2:  [95 %] 95 %  225 lbs 0 oz    Constitutional: no acute distress, alert, conversant  Eyes:   Large periorbital ecchymosis over right eye  HEENT: moist mucous membranes  Respiratory: breath sounds clear bilaterally to auscultation, no wheezes, no crackles  Cardiovascular: regular rate and rhythm, 3/6 murmur best appreciated at apex, near holosystolic.  Not particularly harsh in nature  GI: abdomen soft, non-tender, normoactive bowel sounds, no masses  Lymph/Hematologic: no lower extremity swelling  Genitourinary: not examined  Musculoskeletal: muscular tone intact in all extremities.  Left BKA with prosthesis in place.  Neurologic: mental status grossly intact, no focal deficits, alert.  Patient is appropriate in conversation, distant history appears intact.  This said, patient with no recollection of fall, and appears to have memory impairment beyond this as he cannot recall what would otherwise be quite significant events over the past 2 months.  Psychiatric:  normal affect    Data   Data reviewed today:  I personally reviewed no images or EKG's today.    Recent Labs  Lab 01/28/18  2247   WBC 5.4   HGB 9.5*   MCV 88      INR 3.61*      POTASSIUM 4.1   CHLORIDE 104   CO2 27   BUN 17   CR 1.24   ANIONGAP 6   BENNIE 8.4*   GLC 96   ALBUMIN 3.0*   PROTTOTAL 7.0   BILITOTAL 0.8   ALKPHOS 96   ALT 14   AST 29   TROPI 0.016       Recent Results (from the past 24 hour(s))   Head CT w/o contrast    Narrative    CT SCAN OF THE HEAD WITHOUT CONTRAST   1/28/2018 11:14 PM     HISTORY: Unwitnessed fall, +LOC, coumadin use.     TECHNIQUE:  Axial images of the head and coronal reformations without  IV contrast material. Radiation dose for this scan was reduced using  automated exposure control, adjustment of the mA and/or kV according  to patient size, or iterative reconstruction technique.    COMPARISON: None.    FINDINGS:  There is generalized atrophy of the brain.  There is low  attenuation in the white matter of the cerebral hemispheres consistent  with sequelae of small vessel ischemic disease. There is no evidence  of intracranial hemorrhage, mass, acute infarct or anomaly.     Mild mucosal thickening in the right maxillary sinus. There is no  evidence of trauma.       Impression    IMPRESSION:  No acute abnormality.

## 2018-01-29 NOTE — ED NOTES
Patient presents with [facility]. Patient had unwitnessed fall in bathroom. Patient's wife heard him fall, states he was unresponsive for a few min after fall.  Patient became alert and oriented upon waking.  Patient denies dizziness, light headedness or palpitations prior to falling. Patient admits to 4-5 oz of Vodka daily for 20 years. Patient takes Coumadin for afib, has pacemaker.  Patient states he was wearing sock on left leg prosthesis and believes he may have slipped.  Patient admits to drinking 4-5 oz tonight.  Patient currently denies pain, denies recent illness.  Patient has swollen right eye, and laceration above right eye.

## 2018-01-29 NOTE — PLAN OF CARE
Observation Goals:  -diagnostic tests and consults completed and resulted: partially met, waiting on troponin levels and echocardiogram; CT results in, negative for abnormalities  -vital signs normal or at patient baseline: met, VSS on RA which is patient's BL   -safe disposition plan has been identified: not met, to be determined safe disposition plan.      Care Plan Summary: Patient is A&O x4. VSS on RA.  C/o 5/10 pain, controlled with prn oxycodone and rest. Bruising to right eyebrow/eye, laceration to right forehead from fall, covered with band-aid, CDI. Tele: A-fib, CVR. INR 3.61. Up with SBA/ A1, unsteady gait. Regular diet, tolerating well. Plan on echocardiogram sometime today and continue to monitor vitals/ facial bruising.

## 2018-01-29 NOTE — PHARMACY-ANTICOAGULATION SERVICE
Clinical Pharmacy - Warfarin Dosing Consult     Pharmacy has been consulted to manage this patient s warfarin therapy.  Therapy Goal: Chr Factor 10: 20-40%  Warfarin Prior to Admission: Yes  Warfarin PTA Regimen: 5 mg Mon-Sat (No dose on Sun)  Dose Comments: antiphospholipid    INR   Date Value Ref Range Status   01/28/2018 3.61 (H) 0.86 - 1.14 Final   12/15/2017 2.67 (H) 0.86 - 1.14 Final     Chromogenic Factor 10   Date Value Ref Range Status   01/29/2018 20 (L) 70 - 130 % Final     Comment:     Therapeutic Range:  A Chromogenic Factor 10 level of approximately 20-40%   inversely correlates with an INR of 2-3 for patients receiving Warfarin.   Chromogenic Factor 10 levels below 20% indicate an INR greater than 3 and   levels above 40% indicate an INR less than 2.         Recommend warfarin 5 mg today.  Pharmacy will monitor Antonio Ramirez daily and order warfarin doses to achieve specified goal.      Please contact pharmacy as soon as possible if the warfarin needs to be held for a procedure or if the warfarin goals change.

## 2018-01-29 NOTE — PROGRESS NOTES
01/29/18 1000   Quick Adds   Type of Visit Initial PT Evaluation   Living Environment   Lives With spouse   Living Arrangements apartment   Home Accessibility grab bars present (bathtub);bed and bath on same level   Number of Stairs to Enter Home 0   Number of Stairs Within Home 0   Living Environment Comment elevator accessible apartment   Self-Care   Dominant Hand ambidextrous   Usual Activity Tolerance good   Current Activity Tolerance moderate   Regular Exercise no   Equipment Currently Used at Home grab bar;raised toilet;walker, rolling;cane, straight   Activity/Exercise/Self-Care Comment reports uses SEC more often than FWW and only using recently d/t new prosthesis   Functional Level Prior   Ambulation 1-->assistive equipment   Transferring 1-->assistive equipment   Toileting 1-->assistive equipment   Bathing 1-->assistive equipment   Dressing 0-->independent   Eating 0-->independent   Communication 0-->understands/communicates without difficulty   Fall history within last six months yes   Number of times patient has fallen within last six months 1   Which of the above functional risks had a recent onset or change? fall history   General Information   Onset of Illness/Injury or Date of Surgery - Date 01/29/18   Referring Physician Angus Maki PA-C   Patient/Family Goals Statement home   Pertinent History of Current Problem (include personal factors and/or comorbidities that impact the POC) 73 y/o male admitted 1/29/17 s/p mechanical fall with LOC. Pt with laceration above R eye, bruising and swelling below at time of eval but pt reports not limiting vision at this time. Pt also reports R sided rib fx though no mention in chart. Pt with L BKA (2014 with recent revision d/t abscess), new prosthesis x3-4 days per pt report, was wearing a sock only, no shoe and believes he may have slipped causing his fall, pt also drinks 4-5oz vodka per day and had prior to fall.    Precautions/Limitations fall precautions    General Info Comments activity: ambulate with assist   Cognitive Status Examination   Orientation orientation to person, place and time   Level of Consciousness alert   Follows Commands and Answers Questions 100% of the time;able to follow multistep instructions   Personal Safety and Judgment at risk behaviors demonstrated   Memory intact   Cognitive Comment when donning pants and undergarment pt requires cues to keep shoe on L prosthesis so as not to slip again   Pain Assessment   Patient Currently in Pain Yes, see Vital Sign flowsheet  (does not rate, reports 2/2 R rib fx no imaging or notes )   Integumentary/Edema   Integumentary/Edema Comments Pt with laceration above R eye, bruising and swelling below at time of eval but pt reports not limiting vision at this time.    Posture    Posture Forward head position;Protracted shoulders   Range of Motion (ROM)   ROM Comment B LEs WFL, pt with good B hip rotation for donning doffing clothing and prosthesis   Strength   Manual Muscle Testing Quick Adds No deficits were identified   Strength Comments B LEs grossly 4/5, WFL   Bed Mobility   Bed Mobility Comments pt demonstrates IDN wth supine<> sit   Transfer Skills   Transfer Comments pt progresses to [place] with sit<>stand with safe use of SEC   Gait   Gait Comments pt progresses to [place] with gait with use of SEC, decreased dinorah and L LE stance time; has f/u for prosthetic fit with prosthetist as well as PT   Balance   Balance Comments pt with use of SEC, no overt LOB or unsteadiness when up with use, reports has FWW also to use in home if needed   Sensory Examination   Sensory Perception Comments pt denies numbness or tingling to L LE   General Therapy Interventions   Intervention Comments eval only   Clinical Impression   Criteria for Skilled Therapeutic Intervention evaluation only   Clinical Presentation Stable/Uncomplicated   Clinical Presentation Rationale pt reports pain with mobility at ribs, but no concern  "with return home   Clinical Decision Making (Complexity) Low complexity   Predicted Duration of Therapy Intervention (days/wks) eval only   Anticipated Discharge Disposition Home with Assist  (assist PRN)   Risk & Benefits of therapy have been explained Yes   Patient, Family & other staff in agreement with plan of care Yes   [facility] TM \"6 Clicks\"   2016, Trustees of [facility], under license to [employer].  All rights reserved.   6 Clicks Short Forms Basic Mobility Inpatient Short Form   [facility]  \"6 Clicks\" V.2 Basic Mobility Inpatient Short Form   1. Turning from your back to your side while in a flat bed without using bedrails? 4 - None   2. Moving from lying on your back to sitting on the side of a flat bed without using bedrails? 4 - None   3. Moving to and from a bed to a chair (including a wheelchair)? 4 - None   4. Standing up from a chair using your arms (e.g., wheelchair, or bedside chair)? 4 - None   5. To walk in hospital room? 4 - None   6. Climbing 3-5 steps with a railing? 3 - A Little   Basic Mobility Raw Score (Score out of 24.Lower scores equate to lower levels of function) 23   Total Evaluation Time   Total Evaluation Time (Minutes) 15     "

## 2018-01-29 NOTE — ED PROVIDER NOTES
"  History     Chief Complaint:  Loss of Consciousness     The history is provided by the patient and the spouse.      Antonio Ramirez is an anticoagulated 72 year old male on Coumadin who presents via EMS after a loss of consciousness. This evening the patient was heading from the den to the bathroom and he fell. Patient is unsure how, he believes he may have passed out but denies remembering any prodromal symptoms including lightheadedness or dizziness. Patient did hit his head on a section of carpet in the bathroom. Wife who was in the next room heard him so she ran in there. He did not answer her questions immediately but did made groaning sounds so she contacted EMS. Patient has a wound near his right eyebrow with bruising around the eye. He denies associated facial pain. He does endorse some pain throughout his chest described as \"pain you get from falling.\" Patient denies any changes throughout the day and did not drink more than his typical daily drinks. Spouse notes he has a new prosthesis he is only using for a few hours daily while he gets used to it, but he was not using it more than recommended today. He denies headache, neck pain, back pain, abdominal pain, visual disturbance, eye pain, or other complaint. Tetanus updated 10/2016.     Allergies:  Hmg-Coa-R Inhibitors  Ciprofloxacin     Medications:    Potassium chloride  Vitamin D3  Coumadin  Demadex  Aspirin  Gabapentin  Carvedilol    Past Medical History:    Alcoholism  Amputated left leg  Anemia  Anticardiolipin antibody syndrome  Antiplatelet or antithrombotic long-term use  Aortic stenosis  Arthritis  Balance problem  Cardiomyopathy  Coagulation disorder  CAD  GERD  Heart attack  Heart murmur   Hiatal hernia  Hypercholesterolemia  Left foot drop  Liver disease  Low grade B cell lymphoproliferative disorder  Moderate mitral regurgitation  Monoclonal gammopathy  Neuropathy   Noninfectious ileitis   PE  Prostate cancer  Syncope  Thoracic aortic " "aneurysm without rupture  Thrombocytopenia  Thrombosis of leg  Urethral stricture   Right knee DJD  Physical deconditioning  Stage 3 CKD  Alcoholic cirrhosis of liver  Paroxysmal Atrial fibrillation  Prostate cancer   Lymphedema     Past Surgical History:    Amputate leg below knee, left x2  Appendectomy  Apply wound vac  Arthroplasty knee  CABG  Cholecystectomy   Colonoscopy   Cystoscopy, dilate urethra, combined  Endoscopic stripping veins  EGD  Cataracts  Prostate seen implants  Umbilical hernia repair  I&D lower extremity, left x2  IR IVC filter replacement  Knee scope  Total hip arthroscopy   Elbow surgery     Family History:    Lung cancer  Heart failure     Social History:  Presents with spouse   Tobacco use: Never  Alcohol use: Former alcohol abuse, currently drinks 2-3 vodka drinks nightly   PCP: Main Hardy    Marital Status:      Review of Systems   Eyes: Negative for pain and visual disturbance.   Cardiovascular: Negative for chest pain.   Gastrointestinal: Negative for abdominal pain.   Musculoskeletal: Negative for back pain and neck pain.   Skin: Positive for color change.   Neurological: Negative for dizziness, light-headedness and headaches.   All other systems reviewed and are negative.    Physical Exam     Patient Vitals for the past 24 hrs:   BP Temp Temp src Pulse Resp SpO2 Height Weight   01/28/18 2242 134/73 98.4  F (36.9  C) Oral 79 18 95 % 1.854 m (6' 1\") 102.1 kg (225 lb)      Physical Exam  Nursing note and vitals reviewed.  Constitutional:  Oriented to person, place, and time. Cooperative. Smells of alcohol.  HENT:    3 cm laceration along the right eyebrow with some right periorbital ecchymosis and swelling but no bony step offs or crepitus.   Nose:    Nose normal.   Mouth/Throat:   Mucous membranes are normal.   Eyes:    Conjunctivae normal and EOM are normal without entrapment.      Pupils are equal, round, and reactive to light.   Neck:    Trachea normal.   Cardiovascular: "  Irregularly irregular with a 3/6 systolic murmur. Normal pulses. No murmur heard.  Pulmonary/Chest:  Effort normal and breath sounds normal.   Abdominal:   Soft. Normal appearance and bowel sounds are normal.      There is no tenderness.      There is no rebound and no CVA tenderness.   Musculoskeletal:  Extremities atraumatic x 4. Left BKA with prosthetic in place.   Lymphadenopathy:  No cervical adenopathy.   Neurological:   Alert and oriented to person, place, and time. Normal strength.      No cranial nerve deficit or sensory deficit. GCS eye subscore is 4. GCS verbal subscore is 5. GCS motor subscore is 6.   Skin:    See HENT. No rash noted.   Psychiatric:   Intoxicated.     Emergency Department Course   ECG (22:44:15):  Rate 71 bpm. VA interval *. QRS duration 82. QT/QTc 430/467. P-R-T axes * 79 83. Atrial fibrillation. Abnormal ECG. Agree with computer interpretation. No significant change when compared to EKG dated 11/29/17. Interpreted at 2300 by Joseph Jernigan MD.     Imaging:  Radiographic findings were communicated with the patient and family who voiced understanding of the findings.    CT Head without contrast:  IMPRESSION:  No acute abnormality.      Imaging independently reviewed and agree with radiologist interpretation.      Laboratory:  CBC: HGB 9.5 (L) ow WNL (WBC 5.4, )   BMP: GFR 57 (L), Calcium 8.4 (L) ow WNL (Creatinine 1.24)   Hepatic panel: Bilirubin direct 0.4 (H), Albumin 3.0 (L) ow WNL  INR: 3.61  2247: Troponin: 0.016    Alcohol ethyl: 0.17 (H)    Procedures:     Laceration Repair      LACERATION:  A simple clean 3 cm laceration.    LOCATION:  Right eyebrow.    FUNCTION:  Sensation, circulation and motor are intact.    ANESTHESIA:  Local using 0.25% Marcaine.    PREPARATION:  Irrigation with Normal Saline and Shur Clens.    DEBRIDEMENT:  no debridement and wound explored, no foreign body found.    CLOSURE:  Wound was closed with One Layer.  Skin closed with 8 x 6.0 Ethilon  using interrupted sutures.     Emergency Department Course:  The patient arrived in the emergency department via EMS.  Past medical records, nursing notes, and vitals reviewed.  2300: I performed an exam of the patient and obtained history, as documented above.  IV inserted and blood drawn.   The patient was sent for a CT while in the emergency department, findings above.   Laceration repaired as above.   0022: I rechecked the patient. Explained findings to patient and spouse.   I personally reviewed the laboratory results with the Patient and spouse and answered all related questions prior to admission.    Findings and plan explained to the patient who consents to observation.     0045: Discussed the patient with Dr. Gomez, who will place the patient in observation in the observation area.     Impression & Plan    Medical Decision Making:  Antonio Ramirez is a 72 year old male that came in by ambulance after he presumably passed out, hitting his head. Given the fact he is on Coumadin, he had a head CT obtained, which is unremarkable. He is up to date on his tetanus. I repaired the laceration per the above procedure note. He is slightly intoxicated, but he does chronically drink. I indicated that I felt that it was best to keep an eye on him overnight given the fact that he does not recall actually what happened, as well as the fact that he is intoxicated and on Coumadin. He proceeded to make this that much more difficult due to the fact that he wanted to know exactly what my expert medical opinion was in terms of what I thought he should do. I tried to explain that I felt that he would likely be okay if he went home, but that there was possibly a 5-10% chance of something bad having caused him to pass out. Therefore my recommendation was that he should stay overnight and ultimately that is what he agreed to do. I spoke with Dr. Gomez, who will be taking care of him. The patient and his wife understand these sutures  need to be removed in about 5 days.     Diagnosis:    ICD-10-CM    1. Syncope, unspecified syncope type R55    2. Alcoholic intoxication without complication (H) F10.920    3. 3 cm Eyebrow laceration, right, initial encounter S01.111A     8 sutures       Disposition:  Admitted to hospitalist service.       I, Kevinerika Murrell, am serving as a scribe at 11:00 PM on 1/28/2018 to document services personally performed by Joseph Jernigan MD based on my observations and the provider's statements to me.    1/28/2018    EMERGENCY DEPARTMENT       Joseph Jernigan MD  01/29/18 0426

## 2018-01-29 NOTE — ED NOTES
Bed: ED06  Expected date:   Expected time:   Means of arrival:   Comments:  Opal 2-72M Syncopal Episode

## 2018-01-29 NOTE — PLAN OF CARE
Observation Goals:  -diagnostic tests and consults completed and resulted: not met, waiting on troponin levels and echocardiogram   -vital signs normal or at patient baseline: met, VSS on RA which is patient's BL   -safe disposition plan has been identified: not met, to be determined safe disposition plan.

## 2018-01-29 NOTE — PHARMACY-ADMISSION MEDICATION HISTORY
Admission medication history interview status for the 1/28/2018  admission is complete. See EPIC admission navigator for prior to admission medications     Medication history source reliability:Good    Actions taken by pharmacist (provider contacted, etc):  Spoke w/ patient.  Reviewed recent fill history through [employer].      Additional medication history information not noted on PTA med list :   - Patient reports that he stopped taking Torsemide 20 mg PO BID ~ 1 week ago.   - Patient reports that he stopped taking Repatha inject 140 mg every 2 weeks ~ 2 months ago as insurance would not cover the medication.     - Patient states that he does not always remember to take his medications on a regular basis.     Medication reconciliation/reorder completed by provider prior to medication history? Yes    Time spent in this activity: 15 minutes    Prior to Admission medications    Medication Sig Last Dose Taking? Auth Provider   GABAPENTIN PO Take 600 mg by mouth 2 times daily AM and afternoon  Yes Unknown, Entered By History   GABAPENTIN PO Take 1,200 mg by mouth At Bedtime  Yes Unknown, Entered By History   potassium chloride SA (K-DUR/KLOR-CON M) 10 MEQ CR tablet Take 1 tablet (10 mEq) by mouth daily  Yes Main Hardy MD   Warfarin Sodium (COUMADIN PO) Take by mouth daily 5 mg every day except no dose on Sunday 1/28/2018 at 0 mg Yes Reported, Patient   ASPIRIN EC PO Take 81 mg by mouth daily  Yes Reported, Patient   CARVEDILOL PO Take 3.125 mg by mouth 2 times daily (with meals)   Yes Reported, Patient     Patricia Esparza, PharmD

## 2018-01-29 NOTE — PLAN OF CARE
Problem: Patient Care Overview  Goal: Plan of Care/Patient Progress Review  PT: Order received, chart reviewed, eval completed. Pt is a 73 y/o male admitted 1/29/17 s/p mechanical fall with LOC. Pt with laceration above R eye, bruising and swelling below at time of eval but pt reports not limiting vision at this time. Pt also reports R sided rib fx though no mention in chart. Pt with L BKA (2014 with recent revision d/t abscess), new prosthesis x3-4 days per pt report, was wearing a sock only, no shoe and believes he may have slipped causing his fall, pt also drinks 4-5oz vodka per day and had prior to fall. Lives with wife in an elevator accessible apartment at baseline, has a FWW and SEC, has been using SEC since receiving new prosthetic.     Discharge Planner PT   Patient plan for discharge: home  Current status: pt demonstrates IND with bed mobility, donning/doffing prosthesis L LE, sock and shoe R LE and undergarment+pants. Pt completes sit<>stand transfer with SBA, progresses to Iker with use of SEC. Gait initially with SBA with use of SEC, progresses to Iker x75'. Pt reports no concern re: return home other than pain control at this, time, pt concern discussed with pt's RN and charge RN as well. Pt has no further acute PT needs, will complete order. Pt does have appointment set up for f/u re: prosthetic fit and reports no concern and will use FWW vs SEC as needed at home.  Barriers to return to prior living situation: none  Recommendations for discharge: home, assist as needed, use of AD with gait and wearing shoes with mobility vs sock  Rationale for recommendations: see above       Entered by: Anuja Iyer 01/29/2018 10:49 AM

## 2018-01-29 NOTE — PLAN OF CARE
Problem: Patient Care Overview  Goal: Plan of Care/Patient Progress Review  Outcome: Adequate for Discharge Date Met: 01/29/18  Care Plan Summary Note: vss, alert x4, moderate pain to right chest and elbow. PRN oxycodone given x2. lidocane patch applied to right elbow. Pt stated effective. PIV patent and SL. Tele Afib with CVR. Left BKA skin intact. Cleared by PT to go home with no further recommendation. +BS, lungs clear, on RA. Echo factor 10 done. Results were updated to PA. Pt is to follow up with PCP for wound check as scheduled. D/c instruction given, pt verbalized understanding.

## 2018-01-29 NOTE — ED NOTES
"[facility]  ED Nurse Handoff Report    ED Chief complaint: Loss of Consciousness (fall, +LOC, + blood thinners)      ED Diagnosis:   Final diagnoses:   Syncope, unspecified syncope type   Alcoholic intoxication without complication (H)   3 cm Eyebrow laceration, right, initial encounter - 8 sutures       Code Status: Full Code    Allergies:   Allergies   Allergen Reactions     Hmg-Coa-R Inhibitors Other (See Comments)     Rhabdomyolysis occurred within a couple days     Ciprofloxacin Itching     Severe itching \"like ants were crawling\"       Activity level - Baseline/Home:  Independent    Activity Level - Current:   Stand with Assist     Needed?: No    Isolation: No  Infection: Not Applicable    Bariatric?: No    Vital Signs:   Vitals:    01/28/18 2242   BP: 134/73   Pulse: 79   Resp: 18   Temp: 98.4  F (36.9  C)   TempSrc: Oral   SpO2: 95%   Weight: 102.1 kg (225 lb)   Height: 1.854 m (6' 1\")       Cardiac Rhythm: ,   Cardiac  Cardiac Rhythm: Atrial fibrillation    Pain level:      Is this patient confused?: No    Patient Report: Initial Complaint: Fall. Head lac  Focused Assessment: Patient presents with [facility]. Patient had unwitnessed fall in bathroom. Patient's wife heard him fall, states he was unresponsive for a few min after fall.  Patient became alert and oriented upon waking.  Patient denies dizziness, light headedness or palpitations prior to falling. Patient admits to 4-5 oz of Vodka daily for 20 years. Patient takes Coumadin for afib, has pacemaker.  Patient states he was wearing sock on left leg prosthesis and believes he may have slipped.  Patient admits to drinking 4-5 oz tonight.  Patient currently denies pain, denies recent illness.  Patient has swollen right eye, and laceration above right eye  Tests Performed:Labs, CT  Abnormal Results: Elevated INR and ETOH  Treatments provided: Monitoring, Wound cleanse above eye    Family Comments: Went home    OBS brochure/video " discussed/provided to patient: Yes..patient stated he did not want to watch    ED Medications: Medications - No data to display    Drips infusing?:  No      ED NURSE PHONE NUMBER: [id number]

## 2018-01-30 ENCOUNTER — TELEPHONE (OUTPATIENT)
Dept: PHARMACY | Facility: CLINIC | Age: 73
End: 2018-01-30

## 2018-01-30 NOTE — TELEPHONE ENCOUNTER
Chief Complaint: Syncope, Unspecified Syncope Type, Suspected Closed Fracture Of Rib Of Right Side,1/29/18,ed/ip 0/1  [phone] (home) None (work)

## 2018-01-30 NOTE — TELEPHONE ENCOUNTER
ED / Discharge Outreach Protocol    Patient Contact    Attempt # 1    Was call answered?  No.  Left message on voicemail with information to call me back.    Annabelle FOSTER RN    Discharge Orders         Reason for your hospital stay   You were admitted following a fall. You have a laceration on your eyebrow that required sutures. You are to keep this clean and dry, covered with vaseline or triple antibiotic cream. You also have suspected rib fractures on your right side and have been prescribed pain medication. You cannot combine this with alcohol at home, and need to make sure you are taking plenty of deep breaths and walking regularly to prevent pneumonia.      Follow-up and recommended labs and tests    Follow up with primary care provider, Main Hardy, within 7 days for hospital follow- up and wound check.      Activity   Your activity upon discharge: activity as tolerated      Diet   Follow this diet upon discharge: Orders Placed This Encounter     Regular Diet Adult          Discharge Medications           Current Discharge Medication List            START taking these medications     Details   oxyCODONE IR (ROXICODONE) 5 MG tablet Take 1-2 tablets (5-10 mg) by mouth every 4 hours as needed for moderate to severe pain DO NOT COMBINE WITH ALCOHOL  Qty: 18 tablet, Refills: 0     Associated Diagnoses: Suspected closed fracture of rib of right side     Next 5 appointments (look out 90 days)     Feb 05, 2018  2:00 PM CST   [facility]    [address] [facility] [id number] [phone]

## 2018-01-31 NOTE — TELEPHONE ENCOUNTER
"Patient called back      ED for acute condition Discharge Protocol    \"Hi, my name is Birdie Bermudez, a registered nurse, and I am calling from Dr. Hardy's Office.  I am calling to follow up and see how things are going for you after your recent emergency visit.\"    Tell me how you are doing now that you are home?\"  I had a fall. I went to the ER, kept me overnight and sent me out. I cracked a rib.       Discharge Instructions    \"Let's review your discharge instructions.  What is/are the follow-up recommendations?  Pt. Response: Coming to the clinic to have my stitches removed on February 5th.     \"Has an appointment with your primary care provider been scheduled?\"  Yes. (confirm and remind to bring meds)    Medications    \"Tell me what changed about your medicines when you discharged?\"    \"They sent me home with Oxycodone, doesn't seem to be helping much\" Advised patient to try to ice the area on/off. Pt suggested that he is advised against OTC pain medications by his \"gastro doctor\"     \"What questions do you have about your medications?\"   None        Call Summary    \"What questions or concerns do you have about your recent visit and your follow-up care?\"     none    \"If you have questions or things don't continue to improve, we encourage you contact us through the main clinic number (give number).  Even if the clinic is not open, triage nurses are available 24/7 to help you.     We would like you to know that our clinic has extended hours (provide information).  We also have urgent care (provide details on closest location and hours/contact info)\"    \"Thank you for your time and take care!\"  Birdie PETTIT RN                "

## 2018-02-01 ENCOUNTER — CARE COORDINATION (OUTPATIENT)
Dept: CARE COORDINATION | Facility: CLINIC | Age: 73
End: 2018-02-01

## 2018-02-01 NOTE — PROGRESS NOTES
Clinic Care Coordination Contact  Care Team Conversations    Post hospital follow up call has been completed by triage.    Patient continues to be current with  Home Care  RN Case Manager is Latasha Flower.     RN CCC will continue to collaborate with home care and outreach every 4 weeks.     Sylvie Staley RN, BSN, PHN  Clinic Care Coordinator  [facility]  [phone]

## 2018-02-05 ENCOUNTER — OFFICE VISIT (OUTPATIENT)
Dept: FAMILY MEDICINE | Facility: CLINIC | Age: 73
End: 2018-02-05
Payer: MEDICARE

## 2018-02-05 VITALS
OXYGEN SATURATION: 98 % | HEIGHT: 72 IN | SYSTOLIC BLOOD PRESSURE: 149 MMHG | HEART RATE: 64 BPM | TEMPERATURE: 97.1 F | DIASTOLIC BLOOD PRESSURE: 71 MMHG | WEIGHT: 241 LBS | BODY MASS INDEX: 32.64 KG/M2

## 2018-02-05 DIAGNOSIS — S01.81XD FACIAL LACERATION, SUBSEQUENT ENCOUNTER: ICD-10-CM

## 2018-02-05 DIAGNOSIS — R29.898 SUSPECTED CLOSED FRACTURE OF RIB OF RIGHT SIDE: Primary | ICD-10-CM

## 2018-02-05 DIAGNOSIS — D68.61 ANTIPHOSPHOLIPID ANTIBODY SYNDROME (H): ICD-10-CM

## 2018-02-05 DIAGNOSIS — F43.21 ADJUSTMENT DISORDER WITH DEPRESSED MOOD: ICD-10-CM

## 2018-02-05 DIAGNOSIS — F10.10 ALCOHOL ABUSE: ICD-10-CM

## 2018-02-05 PROCEDURE — 99495 TRANSJ CARE MGMT MOD F2F 14D: CPT | Performed by: INTERNAL MEDICINE

## 2018-02-05 RX ORDER — OXYCODONE HYDROCHLORIDE 5 MG/1
5-10 TABLET ORAL EVERY 4 HOURS PRN
Qty: 36 TABLET | Refills: 0 | Status: SHIPPED | OUTPATIENT
Start: 2018-02-05 | End: 2018-03-14

## 2018-02-05 RX ORDER — WARFARIN SODIUM 5 MG/1
5 TABLET ORAL DAILY
Qty: 30 TABLET | Refills: 1 | Status: SHIPPED | OUTPATIENT
Start: 2018-02-05 | End: 2018-03-15

## 2018-02-05 NOTE — NURSING NOTE
Chief Complaint   Patient presents with     Hospital F/U       Initial /71 (BP Location: Right arm, Cuff Size: Adult Large)  Pulse 64  Temp 97.1  F (36.2  C) (Oral)  Ht 6' (1.829 m)  Wt 241 lb (109.3 kg)  SpO2 98%  BMI 32.69 kg/m2 Estimated body mass index is 32.69 kg/(m^2) as calculated from the following:    Height as of this encounter: 6' (1.829 m).    Weight as of this encounter: 241 lb (109.3 kg).  Medication Reconciliation: complete   Kimberly Smart MA

## 2018-02-05 NOTE — PROGRESS NOTES
SUBJECTIVE:   Antonio Ramirez is a 72 year old male who presents to clinic today for the following health issues:          Hospital Follow-up Visit:    Hospital/Nursing Home/IP Rehab Facility: [facility]  Date of Admission: 01/28/2018  Date of Discharge: 01/29/2018  Reason(s) for Admission:        Syncope, unspecified syncope type  Alcoholic intoxication without complication (H)  Eyebrow laceration, right, initial encounter  Suspected closed fracture of rib of right side         Problems taking medications regularly:  None       Medication changes since discharge: None       Problems adhering to non-medication therapy:  None    Summary of hospitalization:  [facility] discharge summary reviewed  Diagnostic Tests/Treatments reviewed.  Follow up needed: none  Other Healthcare Providers Involved in Patient s Care:         None  Update since discharge: improved.     Post Discharge Medication Reconciliation: discharge medications reconciled and changed, per note/orders (see AVS).  Plan of care communicated with patient     Coding guidelines for this visit:  Type of Medical   Decision Making Face-to-Face Visit       within 7 Days of discharge Face-to-Face Visit        within 14 days of discharge   Moderate Complexity [id number]   High Complexity [id number]              72 year old man with multiple complicated medical problems presents after being hospitalized following a fall with head and chest wall injury.  It is unclear if he had a syncope episode.  He had drank 2 alcoholic beverages prior to the incident.  He also admitted to drinking 3 alcoholic beverages per night on the nights leading up to the incident.  He has had severe pain in his right chest since the fall and has a presumed rib fracture.  He has been managing his pain using oxycodone.  He denies associated shortness of breath, cough, sputum production, fever.  He sustained a laceration to his right forehead and received 8  "interrupted sutures.  He is requesting suture removal today.  He denies headache, nausea, vomiting, vision change, focal weakness or numbness.  He requests a refill of warfarin.  He does have his warfarin dosing managed by [place] [facility].  However, he is about to run out of the medication and requests a temporary supply.  He admits to feeling anxious about his prognosis.  He does not feel like he has alcoholism.  In the past, when he has decided to quit drinking, he has been able to do it without issue.  He denies drinking alcohol to self medicate for anxiety or depression symptoms.  He denies depressed mood or suicidal ideation.  Although he admits to being \"fatalistic\" about his health.    Problem list and histories reviewed & adjusted, as indicated.  Additional history: as documented    Patient Active Problem List   Diagnosis     Right knee DJD     Alcohol abuse     Alcoholic cirrhosis of liver (H)     Chronic kidney disease, stage III (moderate)     Physical deconditioning     CAD, MI in 2007 -- S/P CABG x 3 in 2007     Prostate cancer -- S/P Radiative Seed implants in 2000 (no problems since)     Antiphospholipid Jina Syn, Hypercoag (DVT, PE) -- has IVC filt and Warfarin     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 (H)     Thoracic aortic aneurysm without rupture (H)     Chronic congestive heart failure, unspecified congestive heart failure type (H)     Abscess of Left BKA -- S/P I&D 12/1/17, MSSA     Paroxysmal a-fib (H)     Cellulitis and abscess of leg     Thrombocytopenia -- suspect related to alcohol use     Abscess     Infection of left below knee amputation (H)     Lymphedema     Anemia due to blood loss, acute     Yeast infection of the skin     Pressure ulcer, stage 2     Hyperkalemia     Confusion     Fall     Past Surgical History:   Procedure Laterality Date     AMPUTATE LEG BELOW KNEE Left 04/2014    related to gangrene, started as foot ulcer related to neuropathy     AMPUTATE LEG " BELOW KNEE Left 12/4/2017    Procedure: AMPUTATE LEG BELOW KNEE;  Exploration of Left Leg Below Knee Amputation Stump with Ligation of Bleeders.;  Surgeon: Leandro Arreola MD;  Location:  OR     APPENDECTOMY       APPLY WOUND VAC Left 12/6/2017    Procedure: APPLY WOUND VAC;;  Surgeon: Saima Howard MD;  Location:  SD     ARTHROPLASTY KNEE Right 9/19/2014    Procedure: ARTHROPLASTY KNEE;  Surgeon: Saima Howard MD;  Location:  OR     CARDIAC SURGERY      CABG     CHOLECYSTECTOMY       COLONOSCOPY       CYSTOSCOPY, DILATE URETHRA, COMBINED N/A 10/12/2016    Procedure: COMBINED CYSTOSCOPY, DILATE URETHRA;  Surgeon: Dimitry Bello MD;  Location:  OR     ENDOSCOPIC STRIPPING VEIN(S)       esophagogastroduodenoscopy       EYE SURGERY      cataracts     GENITOURINARY SURGERY      Prostate Seen Implants     HERNIA REPAIR      Umbilical     INCISION AND DRAINAGE LOWER EXTREMITY, COMBINED Left 12/1/2017    Procedure: COMBINED INCISION AND DRAINAGE LOWER EXTREMITY;  INCISION AND DRAINAGE OF LEFT BELOW KNEE AMPUTATION ABSCESS, WOUND VAC PLACEMENT;  Surgeon: Saima Howard MD;  Location:  OR     IR IVC FILTER PLACEMENT       IRRIGATION AND DEBRIDEMENT LOWER EXTREMITY, COMBINED Left 12/6/2017    Procedure: COMBINED IRRIGATION AND DEBRIDEMENT LOWER EXTREMITY;  IRRIGATION AND DEBRIDEMENT OF LEFT BELOW KNEE AMPUTATION SITE WITH WOUND VAC REPLACEMENT;  Surgeon: Saima Howard MD;  Location: [facility]     IRRIGATION AND DEBRIDEMENT LOWER EXTREMITY, COMBINED Left 12/8/2017    Procedure: COMBINED IRRIGATION AND DEBRIDEMENT LOWER EXTREMITY;  IRRIGATION AND DEBRIDEMENT OF LEFT BELOW THE KNEE AMPUTATION AND SHORTENING OF THE TIBIA WITH WOUND CLOSURE;  Surgeon: Saima Howard MD;  Location:  OR     ORTHOPEDIC SURGERY      (R) knee scope     ORTHOPEDIC SURGERY      total hip      ORTHOPEDIC SURGERY      elbow surgery       Social History   Substance Use Topics     Smoking status: Never Smoker     Smokeless  "tobacco: Never Used     Alcohol use Yes      Comment: quit 10/2015; now 3-5 Vodkas/night     Family History   Problem Relation Age of Onset     Lung Cancer Mother      Heart Failure Father       age 87         Current Outpatient Prescriptions   Medication Sig Dispense Refill     oxyCODONE IR (ROXICODONE) 5 MG tablet Take 1-2 tablets (5-10 mg) by mouth every 4 hours as needed for moderate to severe pain DO NOT COMBINE WITH ALCOHOL 36 tablet 0     GABAPENTIN PO Take 600 mg by mouth 2 times daily AM and afternoon       GABAPENTIN PO Take 1,200 mg by mouth At Bedtime       potassium chloride SA (K-DUR/KLOR-CON M) 10 MEQ CR tablet Take 1 tablet (10 mEq) by mouth daily 90 tablet 3     Warfarin Sodium (COUMADIN PO) Take by mouth daily 5 mg every day except no dose on        ASPIRIN EC PO Take 81 mg by mouth daily       CARVEDILOL PO Take 3.125 mg by mouth 2 times daily (with meals)        Allergies   Allergen Reactions     Hmg-Coa-R Inhibitors Other (See Comments)     Rhabdomyolysis occurred within a couple days     Ciprofloxacin Itching     Severe itching \"like ants were crawling\"       Reviewed and updated as needed this visit by clinical staff       Reviewed and updated as needed this visit by Provider         ROS:  Constitutional, HEENT, cardiovascular, pulmonary, gi and gu systems are negative, except as otherwise noted.    OBJECTIVE:     /71 (BP Location: Right arm, Cuff Size: Adult Large)  Pulse 64  Temp 97.1  F (36.2  C) (Oral)  Ht 6' (1.829 m)  Wt 241 lb (109.3 kg)  SpO2 98%  BMI 32.69 kg/m2  Body mass index is 32.69 kg/(m^2).  General: This is a chronically ill-appearing man.  He appears quite comfortable at rest.  HEENT: There is a well-healed laceration over the right eyebrow.  There are several superficial abrasions on the forehead.  There is an ecchymosis under the right eye.  The remainder of the scalp is atraumatic and normocephalic.  Cardiovascular: The heart has a regular rate and " rhythm.  Pulmonary: The lungs are clear to auscultation bilaterally, breathing is not labored.  There is an area of focal tenderness to palpation over the right anterior chest wall without associated bruising.  Mental status: The affect is mildly flat, the mood is described as normal, he is well-groomed, insight is moderately impaired, judgment is mildly impaired, the patient certainly understands the implications of his decisions regarding drinking alcohol and the risks associated with that decision.  He denies suicidal ideation or suicide plan.    Diagnostic Test Results:  Results for orders placed or performed during the hospital encounter of 01/28/18   Head CT w/o contrast    Narrative    CT SCAN OF THE HEAD WITHOUT CONTRAST   1/28/2018 11:14 PM     HISTORY: Unwitnessed fall, +LOC, coumadin use.     TECHNIQUE:  Axial images of the head and coronal reformations without  IV contrast material. Radiation dose for this scan was reduced using  automated exposure control, adjustment of the mA and/or kV according  to patient size, or iterative reconstruction technique.    COMPARISON: None.    FINDINGS:  There is generalized atrophy of the brain.  There is low  attenuation in the white matter of the cerebral hemispheres consistent  with sequelae of small vessel ischemic disease. There is no evidence  of intracranial hemorrhage, mass, acute infarct or anomaly.     Mild mucosal thickening in the right maxillary sinus. There is no  evidence of trauma.       Impression    IMPRESSION:  No acute abnormality.      MALCOM SANTOS MD   INR   Result Value Ref Range    INR 3.61 (H) 0.86 - 1.14   CBC with platelets differential   Result Value Ref Range    WBC 5.4 4.0 - 11.0 10e9/L    RBC Count 3.39 (L) 4.4 - 5.9 10e12/L    Hemoglobin 9.5 (L) 13.3 - 17.7 g/dL    Hematocrit 29.7 (L) 40.0 - 53.0 %    MCV 88 78 - 100 fl    MCH 28.0 26.5 - 33.0 pg    MCHC 32.0 31.5 - 36.5 g/dL    RDW 15.1 (H) 10.0 - 15.0 %    Platelet Count 168 150 -  450 10e9/L    Diff Method Automated Method     % Neutrophils 52.5 %    % Lymphocytes 25.0 %    % Monocytes 16.7 %    % Eosinophils 3.9 %    % Basophils 1.5 %    % Immature Granulocytes 0.4 %    Nucleated RBCs 0 0 /100    Absolute Neutrophil 2.8 1.6 - 8.3 10e9/L    Absolute Lymphocytes 1.4 0.8 - 5.3 10e9/L    Absolute Monocytes 0.9 0.0 - 1.3 10e9/L    Absolute Eosinophils 0.2 0.0 - 0.7 10e9/L    Absolute Basophils 0.1 0.0 - 0.2 10e9/L    Abs Immature Granulocytes 0.0 0 - 0.4 10e9/L    Absolute Nucleated RBC 0.0    Basic metabolic panel   Result Value Ref Range    Sodium 137 133 - 144 mmol/L    Potassium 4.1 3.4 - 5.3 mmol/L    Chloride 104 94 - 109 mmol/L    Carbon Dioxide 27 20 - 32 mmol/L    Anion Gap 6 3 - 14 mmol/L    Glucose 96 70 - 99 mg/dL    Urea Nitrogen 17 7 - 30 mg/dL    Creatinine 1.24 0.66 - 1.25 mg/dL    GFR Estimate 57 (L) >60 mL/min/1.7m2    GFR Estimate If Black 69 >60 mL/min/1.7m2    Calcium 8.4 (L) 8.5 - 10.1 mg/dL   Hepatic panel   Result Value Ref Range    Bilirubin Direct 0.4 (H) 0.0 - 0.2 mg/dL    Bilirubin Total 0.8 0.2 - 1.3 mg/dL    Albumin 3.0 (L) 3.4 - 5.0 g/dL    Protein Total 7.0 6.8 - 8.8 g/dL    Alkaline Phosphatase 96 40 - 150 U/L    ALT 14 0 - 70 U/L    AST 29 0 - 45 U/L   Alcohol ethyl   Result Value Ref Range    Ethanol g/dL 0.17 (H) <0.01 g/dL   Troponin I   Result Value Ref Range    Troponin I ES 0.016 0.000 - 0.045 ug/L   Factor 10 chromogenic   Result Value Ref Range    Chromogenic Factor 10 20 (L) 70 - 130 %   Troponin I   Result Value Ref Range    Troponin I ES 0.017 0.000 - 0.045 ug/L   Troponin I   Result Value Ref Range    Troponin I ES <0.015 0.000 - 0.045 ug/L   EKG 12 lead   Result Value Ref Range    Interpretation ECG Click View Image link to view waveform and result    Echo Complete with Lumason    Narrative    [id number]  [facility]ly [facility]  [id number]  [id number]^SOLIS^RENE^CHRYSTAL           [facility]  Echocardiography Laboratory  [address] [place] [address]  [id number]        Name: GALILEA LUGO  MRN: [id number]  : 1945  Study Date: 2018 07:56 AM  Age: 72 yrs  Gender: Male  Patient Location: [facility]  Reason For Study: Syncope and Collapse  Ordering Physician: RENE SOLIS  Referring Physician: RENE SOLIS  Performed By: JULISA Donovan     BSA: 2.3 m2  Height: 72 in  Weight: 230 lb  HR: 59  BP: 123/58 mmHg  _____________________________________________________________________________  __        Procedure  Complete Portable Echo Adult. Contrast Lumason.  _____________________________________________________________________________  __        Interpretation Summary     Left ventricular systolic function is mildly reduced.  The visual ejection fraction is estimated at 40-45%.  There are regional wall motion abnormalities as specified.  The right ventricle is moderately dilated.  Mildly decreased right ventricular systolic function  There is moderate (2+) mitral regurgitation.  Mild valvular aortic stenosis.  Right ventricular systolic pressure is elevated, consistent with severe  pulmonary hypertension.  Flattened septum is consistent with RV pressure/volume overload.  The study was technically difficult. There is no comparison study available.  _____________________________________________________________________________  __        Left Ventricle  The left ventricle is normal in size. There is mild concentric left  ventricular hypertrophy. Left ventricular systolic function is mildly reduced.  The visual ejection fraction is estimated at 40-45%. Diastolic function not  assessed due to atrial fibrillation. Flattened septum is consistent with RV  pressure/volume overload. Mid and distal septal, mid and distal anterior,  apical akinesis. There are regional wall motion abnormalities as specified.  There is no thrombus seen in the left ventricle.     Right Ventricle  The right ventricle is moderately dilated. Mildly decreased right ventricular  systolic function.      Atria  The left atrium is severely dilated. The right atrium is moderately dilated.     Mitral Valve  There is mild to moderate mitral annular calcification. The mitral valve  leaflets are mildly thickened. There is moderate (2+) mitral regurgitation.  ERO radius calculation by PISA methodology appeared inaccurate. The mean  mitral valve gradient is 3.5 mmHg. (65 bpm).        Tricuspid Valve  There is mild to moderate (1-2+) tricuspid regurgitation. The right  ventricular systolic pressure is approximated at 64.7 mmHg plus the right  atrial pressure. Right ventricular systolic pressure is elevated, consistent  with severe pulmonary hypertension. Dilated IVC (>2.5cm) with <50% respiratory  collapse; right atrial pressure is estimated at 15-20mmHg.     Aortic Valve  The aortic valve is trileaflet. No aortic regurgitation is present. Mild  valvular aortic stenosis. The mean AoV pressure gradient is 12.4 mmHg.     Pulmonic Valve  The pulmonic valve is not well seen, but is grossly normal. There is trace  pulmonic valvular regurgitation.     Vessels  Borderline aortic root dilatation. The ascending aorta is Mildly dilated.     Pericardium  There is no pericardial effusion.        Rhythm  The rhythm was atrial fibrillation.  _____________________________________________________________________________  __  MMode/2D Measurements & Calculations  IVSd: 1.4 cm     LVIDd: 5.0 cm  LVIDs: 3.8 cm  LVPWd: 1.2 cm  FS: 24.6 %  EDV(Teich): 118.1 ml  ESV(Teich): 60.6 ml  LV mass(C)d: 259.1 grams  LV mass(C)dI: 114.6 grams/m2  Ao root diam: 3.7 cm  LA dimension: 5.5 cm  asc Aorta Diam: 4.2 cm  LA/Ao: 1.5  LVOT diam: 2.3 cm  LVOT area: 4.0 cm2  LA Volume (BP): 147.0 ml  LA Volume Index (BP): 65.0 ml/m2  RWT: 0.48           Doppler Measurements & Calculations  MV E max kayden: 145.0 cm/sec  MV mean PG: 3.5 mmHg  MV V2 VTI: 36.3 cm  MVA(VTI): 2.1 cm2  MV dec time: 0.27 sec  Ao V2 max: 220.7 cm/sec  Ao max P.0 mmHg  Ao V2 mean: 166.8  cm/sec  Ao mean P.4 mmHg  Ao V2 VTI: 50.2 cm  DIVINA(I,D): 1.5 cm2  DIVINA(V,D): 1.5 cm2  LV V1 max P.6 mmHg  LV V1 max: 80.8 cm/sec  LV V1 VTI: 19.0 cm  SV(LVOT): 76.5 ml  SI(LVOT): 33.9 ml/m2  TR max kayden: 401.8 cm/sec  TR max P.7 mmHg  DIVINA Index (cm2/m2): 0.67  E/E' av.5  Lateral E/e': 13.8  Medial E/e': 31.2              _____________________________________________________________________________  __        Report approved by: Telma Gutiérrez 2018 10:40 AM          ASSESSMENT/PLAN:       1. Suspected closed fracture of rib of right side  We discussed that he may have pain in his anterior chest wall for several more weeks.  However, the intensity of his pain should improve over time.  Hopefully, within the next 1-2 weeks, he will not need to use opioid analgesics to control this pain.  We reviewed the risks of oxycodone in detail.  We also reviewed the significant risk of mixing oxycodone with alcohol particularly the risk of falls associated with that.  - oxyCODONE IR (ROXICODONE) 5 MG tablet; Take 1-2 tablets (5-10 mg) by mouth every 4 hours as needed for moderate to severe pain DO NOT COMBINE WITH ALCOHOL  Dispense: 36 tablet; Refill: 0    2. Facial laceration, subsequent encounter  8 sutures were removed from the right forehead without issue.    3. Adjustment disorder with depressed mood  We spent some time discussing this patient's mood.  We explored the issue of whether he is self-medicating depression or anxiety symptoms with alcohol.  The patient states that he is not.  The patient states that he does feel mildly depressed and anxious mostly about his medical concerns including his recent revision amputation and concerns that he has about pain in his right hip for which he believes he might need a right hip replacement.  He is concerned about additional surgery.  These worries have detracted from his mood, but he does not want to take medication to improve his mood. I recommended a  visit with a psychotherapist to explore mechanisms for coping with stress related to his recent medical problems.  Also, to explore possible triggers for his use of alcohol despite recommendations from multiple doctors including myself that he should not drink anymore alcohol.  - MENTAL HEALTH REFERRAL  - Adult; Outpatient Treatment; Individual/Couples/Family/Group Therapy/Health Psychology; [facility]: [facility] [phone]; We will contact you to schedule the appointment or please call with any questions    4. Alcohol abuse  See discussion above.  The patient was counseled to stop drinking alcohol.  He was counseled on the risk of alcohol consumption combined with use of opioid analgesics.    5. Antiphospholipid Jina Syn, Hypercoag (DVT, PE) -- has IVC filt and Warfarin  I did provide him with a temporary supply of warfarin today.  He was advised to follow-up with the [place] [facility] as directed for management of his Coumadin anticoagulation for his antiphospholipid antibody syndrome.  His Coumadin anticoagulation is managed using chromogenic factor X levels.  - warfarin (COUMADIN) 5 MG tablet; Take 1 tablet (5 mg) by mouth daily 5 mg every day except no dose on Sunday  Dispense: 30 tablet; Refill: 1    FUTURE APPOINTMENTS:       - Follow-up visit in 3-4 weeks to discuss his mood following 1 or 2 sessions with a counselor.  Return sooner as symptoms dictate.    Main aHrdy MD  [facility]

## 2018-02-05 NOTE — MR AVS SNAPSHOT
After Visit Summary   2/5/2018    Antonio Ramirez    MRN: [id number]           Patient Information     Date Of Birth          1945        Visit Information        Provider Department      2/5/2018 2:00 PM Main Hardy MD [facility]        Today's Diagnoses     Suspected closed fracture of rib of right side    -  1    Facial laceration, subsequent encounter        Adjustment disorder with depressed mood        Alcohol abuse        Antiphospholipid Jina Syn, Hypercoag (DVT, PE) -- has IVC filt and Warfarin           Follow-ups after your visit        Additional Services     MENTAL HEALTH REFERRAL  - Adult; Outpatient Treatment; Individual/Couples/Family/Group Therapy/Health Psychology; FMG: [facility] [phone]; We will contact you to schedule the appointment or please call with any questions       All scheduling is subject to the client's specific insurance plan & benefits, provider/location availability, and provider clinical specialities.  Please arrive 15 minutes early for your first appointment and bring your completed paperwork.    Please be aware that coverage of these services is subject to the terms and limitations of your health insurance plan.  Call member services at your health plan with any benefit or coverage questions.                            Who to contact     If you have questions or need follow up information about today's clinic visit or your schedule please contact [facility] directly at [phone].  Normal or non-critical lab and imaging results will be communicated to you by MyChart, letter or phone within 4 business days after the clinic has received the results. If you do not hear from us within 7 days, please contact the clinic through MyChart or phone. If you have a critical or abnormal lab result, we will notify you by phone as soon as possible.  Submit refill requests through [employer] or call your pharmacy and they  will forward the refill request to us. Please allow 3 business days for your refill to be completed.          Additional Information About Your Visit        [employer] Information     [employer] gives you secure access to your electronic health record. If you see a primary care provider, you can also send messages to your care team and make appointments. If you have questions, please call your primary care clinic.  If you do not have a primary care provider, please call [phone] and they will assist you.        Care EveryWhere ID     This is your Care EveryWhere ID. This could be used by other organizations to access your [facility] medical records  [id number]        Your Vitals Were     Pulse Temperature Height Pulse Oximetry BMI (Body Mass Index)       64 97.1  F (36.2  C) (Oral) 6' (1.829 m) 98% 32.69 kg/m2        Blood Pressure from Last 3 Encounters:   02/05/18 149/71   01/29/18 123/58   01/05/18 127/67    Weight from Last 3 Encounters:   02/05/18 241 lb (109.3 kg)   01/29/18 230 lb 14.4 oz (104.7 kg)   01/05/18 220 lb (99.8 kg)              We Performed the Following     MENTAL HEALTH REFERRAL  - Adult; Outpatient Treatment; Individual/Couples/Family/Group Therapy/Health Psychology; G: [facility] [phone]; We will contact you to schedule the appointment or please call with any questions          Today's Medication Changes          These changes are accurate as of 2/5/18  5:28 PM.  If you have any questions, ask your nurse or doctor.               These medicines have changed or have updated prescriptions.        Dose/Directions    warfarin 5 MG tablet   Commonly known as:  COUMADIN   This may have changed:    - medication strength  - how much to take   Used for:  Antiphospholipid antibody syndrome (H)   Changed by:  Main Hardy MD        Dose:  5 mg   Take 1 tablet (5 mg) by mouth daily 5 mg every day except no dose on Sunday   Quantity:  30 tablet   Refills:  1            Where to  get your medicines      These medications were sent to [id number] - [place], [place] - [address] [facility]  [address] [place] [address] [place] [address]    Hours:  24-hours Phone:  [phone]     warfarin 5 MG tablet         Some of these will need a paper prescription and others can be bought over the counter.  Ask your nurse if you have questions.     Bring a paper prescription for each of these medications     oxyCODONE IR 5 MG tablet                Primary Care Provider Office Phone # Fax #    Main Hardy [phone]       [facility] [address]  [place] [address]        Equal Access to Services     ANTHONY DORANTES : Hadii peyton munguia hadasho Soomaali, waaxda luqadaha, qaybta kaalmada adeegyada, igor miller . So [facility] [phone].    ATENCIÓN: Si habla español, tiene a shrestha disposición servicios gratuitos de asistencia lingüística. [facility] [phone].    We comply with applicable federal civil rights laws and [place] laws. We do not discriminate on the basis of race, color, national origin, age, disability, sex, sexual orientation, or gender identity.            Thank you!     Thank you for choosing [facility]  for your care. Our goal is always to provide you with excellent care. Hearing back from our patients is one way we can continue to improve our services. Please take a few minutes to complete the written survey that you may receive in the mail after your visit with us. Thank you!             Your Updated Medication List - Protect others around you: Learn how to safely use, store and throw away your medicines at www.disposemymeds.org.          This list is accurate as of 2/5/18  5:28 PM.  Always use your most recent med list.                   Brand Name Dispense Instructions for use Diagnosis    ASPIRIN EC PO      Take 81 mg by mouth daily        CARVEDILOL PO      Take 3.125 mg by mouth 2 times  daily (with meals)        * GABAPENTIN PO      Take 600 mg by mouth 2 times daily AM and afternoon        * GABAPENTIN PO      Take 1,200 mg by mouth At Bedtime        oxyCODONE IR 5 MG tablet    ROXICODONE    36 tablet    Take 1-2 tablets (5-10 mg) by mouth every 4 hours as needed for moderate to severe pain DO NOT COMBINE WITH ALCOHOL    Suspected closed fracture of rib of right side       potassium chloride SA 10 MEQ CR tablet    K-DUR/KLOR-CON M    90 tablet    Take 1 tablet (10 mEq) by mouth daily    Chronic congestive heart failure, unspecified congestive heart failure type (H)       warfarin 5 MG tablet    COUMADIN    30 tablet    Take 1 tablet (5 mg) by mouth daily 5 mg every day except no dose on Sunday    Antiphospholipid antibody syndrome (H)       * Notice:  This list has 2 medication(s) that are the same as other medications prescribed for you. Read the directions carefully, and ask your doctor or other care provider to review them with you.

## 2018-03-09 ENCOUNTER — OFFICE VISIT (OUTPATIENT)
Dept: PSYCHOLOGY | Facility: CLINIC | Age: 73
End: 2018-03-09
Attending: INTERNAL MEDICINE
Payer: MEDICARE

## 2018-03-09 DIAGNOSIS — F43.21 ADJUSTMENT DISORDER WITH DEPRESSED MOOD: Primary | ICD-10-CM

## 2018-03-09 PROCEDURE — 90791 PSYCH DIAGNOSTIC EVALUATION: CPT | Performed by: SOCIAL WORKER

## 2018-03-09 ASSESSMENT — ANXIETY QUESTIONNAIRES
5. BEING SO RESTLESS THAT IT IS HARD TO SIT STILL: NOT AT ALL
GAD7 TOTAL SCORE: 0
6. BECOMING EASILY ANNOYED OR IRRITABLE: NOT AT ALL
3. WORRYING TOO MUCH ABOUT DIFFERENT THINGS: NOT AT ALL
1. FEELING NERVOUS, ANXIOUS, OR ON EDGE: NOT AT ALL
2. NOT BEING ABLE TO STOP OR CONTROL WORRYING: NOT AT ALL
IF YOU CHECKED OFF ANY PROBLEMS ON THIS QUESTIONNAIRE, HOW DIFFICULT HAVE THESE PROBLEMS MADE IT FOR YOU TO DO YOUR WORK, TAKE CARE OF THINGS AT HOME, OR GET ALONG WITH OTHER PEOPLE: NOT DIFFICULT AT ALL
7. FEELING AFRAID AS IF SOMETHING AWFUL MIGHT HAPPEN: NOT AT ALL

## 2018-03-09 ASSESSMENT — PATIENT HEALTH QUESTIONNAIRE - PHQ9: 5. POOR APPETITE OR OVEREATING: NOT AT ALL

## 2018-03-09 NOTE — MR AVS SNAPSHOT
MRN:[id number]                      After Visit Summary   3/9/2018    Antonio Ramirez    MRN: [id number]           Visit Information        Provider Department      3/9/2018 1:00 PM Maureen Lamas [facility]      MyChart Information     MyChart gives you secure access to your electronic health record. If you see a primary care provider, you can also send messages to your care team and make appointments. If you have questions, please call your primary care clinic.  If you do not have a primary care provider, please call [phone] and they will assist you.        Care EveryWhere ID     This is your Care EveryWhere ID. This could be used by other organizations to access your [facility] medical records  [id number]        Equal Access to Services     ANTHONY DORANTES : Evelin Cabrera, belén grande, lee galloway, igor mix. So [facility] [phone].    ATENCIÓN: Si habla español, tiene a shrestha disposición servicios gratuitos de asistencia lingüística. Llame al [phone].    We comply with applicable federal civil rights laws and [place] laws. We do not discriminate on the basis of race, color, national origin, age, disability, sex, sexual orientation, or gender identity.

## 2018-03-09 NOTE — PROGRESS NOTES
Adult Intake Structured Interview  Standard Diagnostic Assessment      CLIENT'S NAME: Antonio Ramirez  MRN:   [id number]  :   1945  ACCT. NUMBER: [id number]  DATE OF SERVICE: 3/09/18      Identifying Information:  Client is a 72 year old, , partnered / significant other male. Client was referred for counseling by Dr. Hardy at [facility]. Client is currently retired from a career as a . Client attended the session alone.       Client's Statement of Presenting Concern:  Client reports the reason for seeking therapy at this time as experiencing several medical problems including neuropathy and having his lower left leg amputated below the knee three years ago due to complications from infection.  Client stated that his symptoms have resulted in the following functional impairments: self-care      History of Presenting Concern:  Client reports that these problem(s) began about 5 years ago when he had a blister on his foot become infected, but he was unable to feel it due to neuropathy. 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 He reported that his doctor encouraged him to make an appointment to discuss the resulting lifestyle changes he has experienced. The client explained that his life previous to his amputation involved traveling to [place] every winter with his wife, playing golf in numerous clubs, and finding most of his social life to be associated with outdoor activity. He and his wife have struggled to adjust to having a retired lifestyle they had not anticipated, and the client reported feeling guilt that his condition has impacted his wife's quality of life.  Client has not attempted to resolve these concerns in the past. Client reports that other professional(s) are involved in providing support /  services. The client's PCP is also monitoring symptoms.      Social History:  Client reported he grew up in [place], [place]. They were the first born of three children with a 69 year old brother and a 66 year old sister. He reported that he talks with his siblings on a monthly basis. Parents remained  to each other until they passed. The clien'ts dad  in  from heart failure at 80 years old and the client's mother  when she was 67 years old from lung cancer. Client reported that his childhood was great, though he bumped heads with his dad. Client described his current relationships with family of origin as great.    Client reported a history of 1 committed relationship in college and one marriage. Client has been  for 50 years. Client reported having 2 children, a 44 year old son with 2 children of his own and a 42 year old daughter. Client identified extensive stable and meaningful social connections though he reports becoming distant with them since losing the ability to participate in their usual activities. Client reported that he has not been involved with the legal system. Client's highest education level was graduate school. Client did not identify any learning problems. There are no ethnic, cultural or Zoroastrianism factors that may be relevant for therapy. Client identified his preferred language to be English. Client reported he does not need the assistance of an  or other support involved in therapy. Modifications will not be used to assist communication in therapy. Client did not serve in the .     Client reports family history includes Heart Failure in his father; Lung Cancer in his mother.    Mental Health History:  Client reported no family history of mental health issues.  Client has not been previously diagnosed with a mental health diagnosis.  Client has received the following mental health services in the past: marital counseling about 40 years ago.   Hospitalizations: None.  Client is not currently receiving any mental health services.      Chemical Health History:  Client reported no family history of chemical health issues. Client has received chemical dependency treatment in the past at an outpatient treatment facility (could not remember where) during the 1970's. Client is not currently receiving any chemical dependency treatment. Client reported the following problems as a result of drinking: scarred liver and neuropathy.      Client Reports:  Client reports using alcohol 7 times per week and has 3 glasses of vodka,with a 2 shot pour in each glass at a time. Client first started drinking at age 15.  Client denies using tobacco.  Client denies using marijuana.  Client denies using caffeine.  Client denies using street drugs.  Client denies the non-medical use of prescription or over the counter drugs.    CAGE: A     Patient felt ANNOYED by people criticizing their drinking (or drug use).   Based on the positive Cage-Aid score and clinical interview there  are indications of drug or alcohol abuse. Recommendation for substance abuse disorder evaluation with a substance use professional was given. Therapist did recommend client to reduce use or abstain from alcohol or substance use. Therapist did recommend structured treatment and or community support (AA, 12 step group, etc.). The client reported his drinking has been his habit for a very long time and that he was not interested in reducing or abstaining his use.    Discussed the general effects of drugs and alcohol on health and well-being. The client reported that he knew the consequences of his drinking but that he was willing to accept them. He stated that he had a similar attitude towards his alcohol use as his mother did towards her cigarette smoking, stating that she knew she could get lung cancer from it but that she was ready to go when it was her time. The client reported that his mother smoked up  until her death and he'd likely do the same with his drinking. Encouraged client to consider the quality of his life as he gets older and how his drinking may impact his family members who may have to be his caretakers some day.       Significant Losses / Trauma / Abuse / Neglect Issues:  There are indications or report of significant loss, trauma, abuse or neglect issues related to: death of mother (who the client was close with) and father (who the client had conflict with during youth but became close in adulthood).    Issues of possible neglect are not present.      Medical Issues:  Client has had a physical exam to rule out medical causes for current symptoms. Date of last physical exam was within the past year. Client was encouraged to follow up with PCP if symptoms were to develop. The client has a [facility] Provider, who is named Main Hardy. The client reports not having a psychiatrist. Client reports the following current medical concerns: neuropathy, foot amputation, liver problems. The client reports the presence of chronic or episodic pain in the form of right hip pain when overused, recently recovering from infection in stump of amputation. The pain level is moderate and has a frequency of only when the client overuses his legs.. There are not significant nutritional concerns.     Client reports current meds as:   Current Outpatient Prescriptions   Medication Sig     oxyCODONE IR (ROXICODONE) 5 MG tablet Take 1-2 tablets (5-10 mg) by mouth every 4 hours as needed for moderate to severe pain DO NOT COMBINE WITH ALCOHOL     warfarin (COUMADIN) 5 MG tablet Take 1 tablet (5 mg) by mouth daily 5 mg every day except no dose on Sunday     GABAPENTIN PO Take 600 mg by mouth 2 times daily AM and afternoon     GABAPENTIN PO Take 1,200 mg by mouth At Bedtime     potassium chloride SA (K-DUR/KLOR-CON M) 10 MEQ CR tablet Take 1 tablet (10 mEq) by mouth daily     ASPIRIN EC PO Take 81 mg by mouth  "daily     CARVEDILOL PO Take 3.125 mg by mouth 2 times daily (with meals)      No current facility-administered medications for this visit.        Client Allergies:  Allergies   Allergen Reactions     Hmg-Coa-R Inhibitors Other (See Comments)     Rhabdomyolysis occurred within a couple days     Ciprofloxacin Itching     Severe itching \"like ants were crawling\"     the following allergies to medications: Hmg-coa-r Inhibitors will cause Rhabdomyolysis     Medical History:  Past Medical History:   Diagnosis Date     Alcoholism (H)      Amputated left leg (H)      Anemia      Anticardiolipin antibody syndrome (H)      Antiplatelet or antithrombotic long-term use      Aortic stenosis      Arthritis      Balance problem      Cardiomyopathy (H)      Coagulation disorder (H)     cardiolipinantibody syndrome     Coronary artery disease      Gastro-oesophageal reflux disease      Heart attack 2007     Hiatal hernia      Hypercholesterolemia      Hypertension      Left foot drop      Liver disease     alcoholic liver disease, alcoholic cirrohsosis, portal hypertension     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    Client was provided recommendation to follow-up with prescribing physician.    Mental Status Assessment:  Appearance:   Appropriate   Eye Contact:   Good   Psychomotor Behavior: Normal   Attitude:   Cooperative "   Orientation:   All  Speech   Rate / Production: Normal    Volume:  Normal   Mood:    Sad  Client appeared sad discussing the impact his health has had on his wife  Affect:    Appropriate   Thought Content:  Clear   Thought Form:  Coherent  Logical   Insight:    Good       Review of Symptoms:  Depression: Interest Energy Appetite  Estrellita:  No symptoms  Psychosis: No symptoms  Anxiety: No symptoms  Panic:  No symptoms  Post Traumatic Stress Disorder: No symptoms  Obsessive Compulsive Disorder: No symptoms  Eating Disorder: No symptoms  Oppositional Defiant Disorder: No symptoms  ADD / ADHD: No symptoms  Conduct Disorder: No symptoms      Safety Assessment:    History of Safety Concerns:   Client denied a history of suicidal ideation.    Client denied a history of suicide attempts.    Client denied a history of homicidal ideation.    Client denied a history of self-injurious ideation and behaviors.    Client denied a history of personal safety concerns.    Client reported a history of assaultive behaviors.  client reported in his teens and early 20's he would get into fights, particularly when intoxicated      Current Safety Concerns:  Client denies current suicidal ideation.    Client denies current homicidal ideation and behaviors.  Client denies current self-injurious ideation and behaviors.    Client denies current concerns for personal safety.      Client reports there are firearms in the house. The firearms are secured in a locked space.     Plan for Safety and Risk Management:  A safety and risk management plan has not been developed at this time, however client was given the after-hours number / 911 should there be a change in any of these risk factors.    Client's Strengths and Limitations:  Client identified the following strengths or resources that will help him succeed in counseling: friends / good social support and family support. Client identified the following supports: family and friends. Things that  may interfere with the client's success in counseling include: unsure he needs therapy.      Diagnostic Criteria:  A. The development of emotional or behavioral symptoms in response to an identifiable stressor(s) occurring within 3 months of the onset of the stressor(s)  B. These symptoms or behaviors are clinically significant, as evidenced by one or both of the following:  C. The stress-related disturbance does not meet criteria for another disorder & is not not an exacerbation of another mental disorder  D. The symptoms do not represent normal bereavement  E. Once the stressor or its consequences have terminated, the symptoms do not persist for more than an additional 6 months       * Adjustment Disorder with Depressed Mood: The predominant manifestations are symptoms such as low mood, tearfulness, or feelings of hopelessness      Functional Status:  Client's symptoms are causing reduced functional status in the following areas: Social / Relational - client reports isolating behaviors and irritability with wife      DSM5 Diagnoses: (Sustained by DSM5 Criteria Listed Above)  Diagnoses: Adjustment Disorders  309.0 (F43.21) With depressed mood  Psychosocial & Contextual Factors: Client's left leg was amputated below the knee three years ago, greatly impacting his ability to participate in social activities and engage with his wife outside of their  Home.  WHODAS 2.0 (12 item)            This questionnaire asks about difficulties due to health conditions. Health conditions  include  disease or illnesses, other health problems that may be short or long lasting,  injuries, mental health or emotional problems, and problems with alcohol or drugs.                     Think back over the past 30 days and answer these questions, thinking about how much  difficulty you had doing the following activities. For each question, please [place] only  one response.    S1 Standing for long periods such as 30 minutes? Severe =       4    S2 Taking care of household responsibilities? None =         1   S3 Learning a new task, for example, learning how to get to a new place? None =         1   S4 How much of a problem do you have joining community activities (for example, festivals, Scientology or other activities) in the same way as anyone else can? None =         1   S5 How much have you been emotionally affected by your health problems? Moderate =   3     In the past 30 days, how much difficulty did you have in:   S6 Concentrating on doing something for ten minutes? None =         1   S7 Walking a long distance such as a kilometer (or equivalent)? Extreme / or cannot do = 5   S8 Washing your whole body? None =         1   S9 Getting dressed? None =         1   S10 Dealing with people you do not know? None =         1   S11 Maintaining a friendship? None =         1   S12 Your day to day work? None =         1     H1 Overall, in the past 30 days, how many days were these difficulties present? Record number of days 30   H2 In the past 30 days, for how many days were you totally unable to carry out your usual activities or work because of any health condition? Record number of days  0   H3 In the past 30 days, not counting the days that you were totally unable, for how many days did you cut back or reduce your usual activities or work because of any health condition? Record number of days 0     Attendance Agreement:  Client has signed Attendance Agreement:Yes      Collaboration:  Collaboration with other professionals is not indicated at this time.      Preliminary Treatment Plan:  The client reports no currently identified Scientology, ethnic or cultural issues relevant to therapy.     services are not indicated.    Modifications to assist communication are not indicated.    The concerns identified by the client will be addressed in therapy.    Initial Treatment will focus on: Adjustment Difficulties related to: lifestyle changes due to  physical health.    As a preliminary treatment goal, client will develop coping/problem-solving skills to facilitate more adaptive adjustment as evidenced by client's PHQ-9 reducing from 7 to 3 over the next three months and client reporting improved engagement with others.    The focus of initial interventions will be to increase ability to function adaptively and increase coping skills.    Referral to another professional/service is not indicated at this time..    A Release of Information is not needed at this time.    Report to child / adult protection services was NA.        Maureen JAIN, LGSW March 9, 2018  Note reviewed and clinical supervision by CRISTOBAL Hurd [facility] 3/16/2018

## 2018-03-09 NOTE — Clinical Note
I saw this client for a mental health intake and will be continuing individual therapy with them. We will be discussing how to adapt to his new lifestyle and cope with stage of life changes. If you have any questions or concerns please let me know.  Thank you, Maureen Lamas

## 2018-03-10 ASSESSMENT — PATIENT HEALTH QUESTIONNAIRE - PHQ9: SUM OF ALL RESPONSES TO PHQ QUESTIONS 1-9: 7

## 2018-03-10 ASSESSMENT — ANXIETY QUESTIONNAIRES: GAD7 TOTAL SCORE: 0

## 2018-03-14 ENCOUNTER — OFFICE VISIT (OUTPATIENT)
Dept: FAMILY MEDICINE | Facility: CLINIC | Age: 73
End: 2018-03-14
Payer: MEDICARE

## 2018-03-14 VITALS
HEART RATE: 66 BPM | DIASTOLIC BLOOD PRESSURE: 64 MMHG | BODY MASS INDEX: 29.53 KG/M2 | HEIGHT: 72 IN | TEMPERATURE: 98.7 F | OXYGEN SATURATION: 94 % | SYSTOLIC BLOOD PRESSURE: 128 MMHG | WEIGHT: 218 LBS

## 2018-03-14 DIAGNOSIS — G62.9 PERIPHERAL POLYNEUROPATHY: ICD-10-CM

## 2018-03-14 DIAGNOSIS — F10.10 ALCOHOL ABUSE: ICD-10-CM

## 2018-03-14 DIAGNOSIS — I50.9 CHRONIC CONGESTIVE HEART FAILURE, UNSPECIFIED CONGESTIVE HEART FAILURE TYPE: ICD-10-CM

## 2018-03-14 DIAGNOSIS — J31.0 CHRONIC RHINITIS, UNSPECIFIED TYPE: ICD-10-CM

## 2018-03-14 DIAGNOSIS — M25.373 UNSTABLE ANKLE, UNSPECIFIED LATERALITY: ICD-10-CM

## 2018-03-14 DIAGNOSIS — K70.30 ALCOHOLIC CIRRHOSIS OF LIVER WITHOUT ASCITES (H): ICD-10-CM

## 2018-03-14 DIAGNOSIS — F43.23 ADJUSTMENT DISORDER WITH MIXED ANXIETY AND DEPRESSED MOOD: Primary | ICD-10-CM

## 2018-03-14 PROCEDURE — 99214 OFFICE O/P EST MOD 30 MIN: CPT | Performed by: INTERNAL MEDICINE

## 2018-03-14 RX ORDER — WARFARIN SODIUM 5 MG/1
5 TABLET ORAL DAILY
Qty: 30 TABLET | Refills: 1 | Status: CANCELLED | OUTPATIENT
Start: 2018-03-14

## 2018-03-14 RX ORDER — GABAPENTIN 600 MG/1
600 TABLET ORAL 2 TIMES DAILY
Qty: 90 TABLET | Refills: 1 | Status: ON HOLD | OUTPATIENT
Start: 2018-03-14 | End: 2018-04-12

## 2018-03-14 RX ORDER — CARVEDILOL 3.12 MG/1
3.12 TABLET ORAL 2 TIMES DAILY WITH MEALS
Qty: 180 TABLET | Refills: 1 | COMMUNITY
Start: 2018-03-14 | End: 2018-03-14

## 2018-03-14 RX ORDER — CARVEDILOL 3.12 MG/1
3.12 TABLET ORAL 2 TIMES DAILY WITH MEALS
Qty: 180 TABLET | Refills: 1 | Status: ON HOLD | OUTPATIENT
Start: 2018-03-14 | End: 2018-04-12

## 2018-03-14 RX ORDER — GABAPENTIN 600 MG/1
600 TABLET ORAL 2 TIMES DAILY
Qty: 90 TABLET | Refills: 1 | COMMUNITY
Start: 2018-03-14 | End: 2018-03-14

## 2018-03-14 NOTE — MR AVS SNAPSHOT
After Visit Summary   3/14/2018    Antonio Ramirez    MRN: [id number]           Patient Information     Date Of Birth          1945        Visit Information        Provider Department      3/14/2018 3:00 PM Main Hardy MD [facility]        Today's Diagnoses     Adjustment disorder with mixed anxiety and depressed mood    -  1    Chronic congestive heart failure, unspecified congestive heart failure type (H)        Peripheral polyneuropathy        Alcohol abuse        Alcoholic cirrhosis of liver without ascites (H)        Chronic rhinitis, unspecified type        Unstable ankle, unspecified laterality           Follow-ups after your visit        Your next 10 appointments already scheduled     Mar 16, 2018 12:00 PM CDT   Return Visit with Maureenjohana Lamas   [facility] ([facility] [place])    [address]  [place] [address] [phone]              Who to contact     If you have questions or need follow up information about today's clinic visit or your schedule please contact [facility] directly at [phone].  Normal or non-critical lab and imaging results will be communicated to you by [employer], letter or phone within 4 business days after the clinic has received the results. If you do not hear from us within 7 days, please contact the clinic through [employer] or phone. If you have a critical or abnormal lab result, we will notify you by phone as soon as possible.  Submit refill requests through [employer] or call your pharmacy and they will forward the refill request to us. Please allow 3 business days for your refill to be completed.          Additional Information About Your Visit        [employer] Information     [employer] gives you secure access to your electronic health record. If you see a primary care provider, you can also send messages to your care team and make appointments. If you have questions, please call your primary care clinic.  If  you do not have a primary care provider, please call [phone] and they will assist you.        Care EveryWhere ID     This is your Care EveryWhere ID. This could be used by other organizations to access your [place] medical records  [id number]        Your Vitals Were     Pulse Temperature Height Pulse Oximetry BMI (Body Mass Index)       66 98.7  F (37.1  C) (Tympanic) 6' (1.829 m) 94% 29.57 kg/m2        Blood Pressure from Last 3 Encounters:   03/14/18 128/64   02/05/18 149/71   01/29/18 123/58    Weight from Last 3 Encounters:   03/14/18 218 lb (98.9 kg)   02/05/18 241 lb (109.3 kg)   01/29/18 230 lb 14.4 oz (104.7 kg)              Today, you had the following     No orders found for display         Where to get your medicines      These medications were sent to [facility] [address] [place] [address] [address]  [address] [facility] [address]    Hours:  24-hours Phone:  [phone]     carvedilol 3.125 MG tablet    gabapentin 600 MG tablet          Primary Care Provider Office Phone # Fax #    Main Hardy [phone]       [facility] [address]  [facility] [address]        Equal Access to Services     [facility] AH: Hadii aad ku hadasho Soomaali, waaxda luqadaha, qaybta kaalmada adeegyada, igor smith haymayito miller . So [facility] [phone].    ATENCIÓN: Si habla español, tiene a shrestha disposición servicios gratuitos de asistencia lingüística. Llame al [phone].    We comply with applicable federal civil rights laws and [place] laws. We do not discriminate on the basis of race, color, national origin, age, disability, sex, sexual orientation, or gender identity.            Thank you!     Thank you for choosing [facility]  for your care. Our goal is always to provide you with excellent care. Hearing back from our patients is one way we can continue to improve our services. Please take a few  minutes to complete the written survey that you may receive in the mail after your visit with us. Thank you!             Your Updated Medication List - Protect others around you: Learn how to safely use, store and throw away your medicines at www.disposemymeds.org.          This list is accurate as of 3/14/18  5:30 PM.  Always use your most recent med list.                   Brand Name Dispense Instructions for use Diagnosis    ASPIRIN EC PO      Take 81 mg by mouth daily        carvedilol 3.125 MG tablet    COREG    180 tablet    Take 1 tablet (3.125 mg) by mouth 2 times daily (with meals)    Chronic congestive heart failure, unspecified congestive heart failure type (H)       gabapentin 600 MG tablet    NEURONTIN    90 tablet    Take 1 tablet (600 mg) by mouth 2 times daily    Peripheral polyneuropathy       potassium chloride SA 10 MEQ CR tablet    K-DUR/KLOR-CON M    90 tablet    Take 1 tablet (10 mEq) by mouth daily    Chronic congestive heart failure, unspecified congestive heart failure type (H)       warfarin 5 MG tablet    COUMADIN    30 tablet    Take 1 tablet (5 mg) by mouth daily 5 mg every day except no dose on Sunday    Antiphospholipid antibody syndrome (H)

## 2018-03-14 NOTE — NURSING NOTE
Chief Complaint   Patient presents with     Follow up        Initial /64 (BP Location: Left arm, Patient Position: Chair, Cuff Size: Adult Regular)  Pulse 66  Temp 98.7  F (37.1  C) (Tympanic)  Ht 6' (1.829 m)  Wt 218 lb (98.9 kg)  SpO2 94%  BMI 29.57 kg/m2 Estimated body mass index is 29.57 kg/(m^2) as calculated from the following:    Height as of this encounter: 6' (1.829 m).    Weight as of this encounter: 218 lb (98.9 kg)..    BP completed using cuff size: regular  MEDICATIONS REVIEWED  SOCIAL AND FAMILY HX REVIEWED  Sydnie Maxwell CMA

## 2018-03-15 DIAGNOSIS — D68.61 ANTIPHOSPHOLIPID ANTIBODY SYNDROME (H): ICD-10-CM

## 2018-03-15 RX ORDER — WARFARIN SODIUM 5 MG/1
TABLET ORAL
Qty: 79 TABLET | Refills: 0 | Status: ON HOLD | OUTPATIENT
Start: 2018-03-15 | End: 2018-04-12

## 2018-03-15 NOTE — TELEPHONE ENCOUNTER
Routing refill request to provider for review/approval because:  Pt's warfarin is not managed [facility]   Uncertain who is managing pt's warfarin or what pt's current dosing is    Yoly NEW RN

## 2018-03-15 NOTE — TELEPHONE ENCOUNTER
Patient notified with providers response below and agrees with plan.  Anuja Chiang RN  Triage-[employer] workforce

## 2018-03-15 NOTE — TELEPHONE ENCOUNTER
"Last Written Prescription Date:  2/05/18  Last Fill Quantity: 30 tablet,  # refills: 1   Last office visit: 3/14/2018 with prescribing provider:  Antony   Future Office Visit:   Next 5 appointments (look out 90 days)     Mar 16, 2018 12:00 PM CDT   Return Visit with Maureen Lamas   [facility] [place] ([facility] [place])    [address]  [place] [address] [phone]                 Requested Prescriptions   Pending Prescriptions Disp Refills     warfarin (COUMADIN) 5 MG tablet [Pharmacy Med Name: WARFARIN SOD 5MG TABLETS (PEACH)] 79 tablet 1     Sig: TAKE ONE TABLET BY MOUTH EVERY DAY EXCEPT SUNDAY    Vitamin K Antagonists Failed    3/15/2018  9:53 AM       Failed - INR is within goal in the past 6 weeks    Confirm INR is within goal in the past 6 weeks.     Recent Labs   Lab Test  01/28/18   2247   INR  3.61*                      Passed - Recent (12 mo) or future (30 days) visit within the authorizing provider's specialty    Patient had office visit in the last 12 months or has a visit in the next 30 days with authorizing provider or within the authorizing provider's specialty.  See \"Patient Info\" tab in inbasket, or \"Choose Columns\" in Meds & Orders section of the refill encounter.           Passed - Patient is 18 years of age or older          "

## 2018-03-15 NOTE — TELEPHONE ENCOUNTER
Can you inform patient that if [place] [facility] is checking his INR or chromogenic 10 leves, they should probably take care of the warfarin refills to avoid confusion, I did refill his most recent request, but future refills should come from Dr. Guthrie's clinic

## 2018-04-07 ENCOUNTER — ANESTHESIA (OUTPATIENT)
Dept: SURGERY | Facility: CLINIC | Age: 73
DRG: 025 | End: 2018-04-07
Payer: MEDICARE

## 2018-04-07 ENCOUNTER — HOSPITAL ENCOUNTER (INPATIENT)
Facility: CLINIC | Age: 73
LOS: 5 days | Discharge: NURSING, REHAB OR LONG-TERM CARE | DRG: 025 | End: 2018-04-12
Attending: EMERGENCY MEDICINE | Admitting: INTERNAL MEDICINE
Payer: MEDICARE

## 2018-04-07 ENCOUNTER — APPOINTMENT (OUTPATIENT)
Dept: GENERAL RADIOLOGY | Facility: CLINIC | Age: 73
DRG: 025 | End: 2018-04-07
Attending: INTERNAL MEDICINE
Payer: MEDICARE

## 2018-04-07 ENCOUNTER — NURSE TRIAGE (OUTPATIENT)
Dept: NURSING | Facility: CLINIC | Age: 73
End: 2018-04-07

## 2018-04-07 ENCOUNTER — ANESTHESIA EVENT (OUTPATIENT)
Dept: SURGERY | Facility: CLINIC | Age: 73
DRG: 025 | End: 2018-04-07
Payer: MEDICARE

## 2018-04-07 ENCOUNTER — APPOINTMENT (OUTPATIENT)
Dept: CT IMAGING | Facility: CLINIC | Age: 73
DRG: 025 | End: 2018-04-07
Attending: EMERGENCY MEDICINE
Payer: MEDICARE

## 2018-04-07 ENCOUNTER — APPOINTMENT (OUTPATIENT)
Dept: CT IMAGING | Facility: CLINIC | Age: 73
DRG: 025 | End: 2018-04-07
Attending: INTERNAL MEDICINE
Payer: MEDICARE

## 2018-04-07 DIAGNOSIS — I62.00 SUBDURAL HEMORRHAGE (H): ICD-10-CM

## 2018-04-07 DIAGNOSIS — I25.119 CORONARY ARTERY DISEASE INVOLVING NATIVE HEART WITH ANGINA PECTORIS, UNSPECIFIED VESSEL OR LESION TYPE (H): Primary | ICD-10-CM

## 2018-04-07 DIAGNOSIS — D68.9 COAGULOPATHY (H): ICD-10-CM

## 2018-04-07 PROBLEM — S06.5XAA SUBDURAL HEMATOMA (H): Status: ACTIVE | Noted: 2018-04-07

## 2018-04-07 PROBLEM — Z98.890 S/P CRANIOTOMY: Status: ACTIVE | Noted: 2018-04-07

## 2018-04-07 LAB
ABO + RH BLD: NORMAL
ABO + RH BLD: NORMAL
ALBUMIN SERPL-MCNC: 3.5 G/DL (ref 3.4–5)
ALBUMIN UR-MCNC: 30 MG/DL
ALP SERPL-CCNC: 93 U/L (ref 40–150)
ALT SERPL W P-5'-P-CCNC: 17 U/L (ref 0–70)
ANION GAP SERPL CALCULATED.3IONS-SCNC: 8 MMOL/L (ref 3–14)
APPEARANCE UR: ABNORMAL
AST SERPL W P-5'-P-CCNC: 30 U/L (ref 0–45)
BACTERIA #/AREA URNS HPF: ABNORMAL /HPF
BASE DEFICIT BLDA-SCNC: 3.3 MMOL/L
BASE DEFICIT BLDA-SCNC: 4.3 MMOL/L
BASOPHILS # BLD AUTO: 0.1 10E9/L (ref 0–0.2)
BASOPHILS NFR BLD AUTO: 2.3 %
BILIRUB SERPL-MCNC: 2.2 MG/DL (ref 0.2–1.3)
BILIRUB UR QL STRIP: NEGATIVE
BLD GP AB SCN SERPL QL: NORMAL
BLOOD BANK CMNT PATIENT-IMP: NORMAL
BUN SERPL-MCNC: 17 MG/DL (ref 7–30)
CALCIUM SERPL-MCNC: 9.3 MG/DL (ref 8.5–10.1)
CHLORIDE SERPL-SCNC: 109 MMOL/L (ref 94–109)
CO2 SERPL-SCNC: 24 MMOL/L (ref 20–32)
COLOR UR AUTO: YELLOW
CREAT SERPL-MCNC: 0.86 MG/DL (ref 0.66–1.25)
DIFFERENTIAL METHOD BLD: ABNORMAL
EOSINOPHIL # BLD AUTO: 0.3 10E9/L (ref 0–0.7)
EOSINOPHIL NFR BLD AUTO: 4.8 %
ERYTHROCYTE [DISTWIDTH] IN BLOOD BY AUTOMATED COUNT: 19.2 % (ref 10–15)
ETHANOL SERPL-MCNC: <0.01 G/DL
GFR SERPL CREATININE-BSD FRML MDRD: 86 ML/MIN/1.7M2
GLUCOSE BLDC GLUCOMTR-MCNC: 80 MG/DL (ref 70–99)
GLUCOSE SERPL-MCNC: 87 MG/DL (ref 70–99)
GLUCOSE UR STRIP-MCNC: NEGATIVE MG/DL
HCO3 BLD-SCNC: 21 MMOL/L (ref 21–28)
HCO3 BLD-SCNC: 21 MMOL/L (ref 21–28)
HCT VFR BLD AUTO: 35.8 % (ref 40–53)
HGB BLD-MCNC: 11.3 G/DL (ref 13.3–17.7)
HGB UR QL STRIP: ABNORMAL
IMM GRANULOCYTES # BLD: 0 10E9/L (ref 0–0.4)
IMM GRANULOCYTES NFR BLD: 0.2 %
INR PPP: 1.96 (ref 0.86–1.14)
INTERPRETATION ECG - MUSE: NORMAL
KETONES UR STRIP-MCNC: 10 MG/DL
LEUKOCYTE ESTERASE UR QL STRIP: ABNORMAL
LYMPHOCYTES # BLD AUTO: 1.1 10E9/L (ref 0.8–5.3)
LYMPHOCYTES NFR BLD AUTO: 20.5 %
MCH RBC QN AUTO: 25.6 PG (ref 26.5–33)
MCHC RBC AUTO-ENTMCNC: 31.6 G/DL (ref 31.5–36.5)
MCV RBC AUTO: 81 FL (ref 78–100)
MONOCYTES # BLD AUTO: 0.8 10E9/L (ref 0–1.3)
MONOCYTES NFR BLD AUTO: 15.1 %
MRSA DNA SPEC QL NAA+PROBE: NEGATIVE
MUCOUS THREADS #/AREA URNS LPF: PRESENT /LPF
NEUTROPHILS # BLD AUTO: 3 10E9/L (ref 1.6–8.3)
NEUTROPHILS NFR BLD AUTO: 57.1 %
NITRATE UR QL: POSITIVE
NRBC # BLD AUTO: 0 10*3/UL
NRBC BLD AUTO-RTO: 0 /100
NT-PROBNP SERPL-MCNC: 4334 PG/ML (ref 0–1800)
O2/TOTAL GAS SETTING VFR VENT: 50 %
OXYHGB MFR BLD: 98 % (ref 92–100)
OXYHGB MFR BLD: 99 % (ref 92–100)
PCO2 BLD: 35 MM HG (ref 35–45)
PCO2 BLD: 36 MM HG (ref 35–45)
PH BLD: 7.37 PH (ref 7.35–7.45)
PH BLD: 7.39 PH (ref 7.35–7.45)
PH UR STRIP: 6 PH (ref 5–7)
PLATELET # BLD AUTO: 150 10E9/L (ref 150–450)
PO2 BLD: 161 MM HG (ref 80–105)
PO2 BLD: 165 MM HG (ref 80–105)
POTASSIUM SERPL-SCNC: 4 MMOL/L (ref 3.4–5.3)
PROT SERPL-MCNC: 7.7 G/DL (ref 6.8–8.8)
RADIOLOGIST FLAGS: ABNORMAL
RBC # BLD AUTO: 4.42 10E12/L (ref 4.4–5.9)
RBC #/AREA URNS AUTO: 10 /HPF (ref 0–2)
SODIUM SERPL-SCNC: 141 MMOL/L (ref 133–144)
SOURCE: ABNORMAL
SP GR UR STRIP: 1.02 (ref 1–1.03)
SPECIMEN EXP DATE BLD: NORMAL
SPECIMEN SOURCE: NORMAL
SQUAMOUS #/AREA URNS AUTO: <1 /HPF (ref 0–1)
TROPONIN I SERPL-MCNC: <0.015 UG/L (ref 0–0.04)
UROBILINOGEN UR STRIP-MCNC: NORMAL MG/DL (ref 0–2)
WBC # BLD AUTO: 5.2 10E9/L (ref 4–11)
WBC #/AREA URNS AUTO: >182 /HPF (ref 0–5)
WBC CLUMPS #/AREA URNS HPF: PRESENT /HPF

## 2018-04-07 PROCEDURE — 80320 DRUG SCREEN QUANTALCOHOLS: CPT | Performed by: EMERGENCY MEDICINE

## 2018-04-07 PROCEDURE — 00000146 ZZHCL STATISTIC GLUCOSE BY METER IP

## 2018-04-07 PROCEDURE — 84484 ASSAY OF TROPONIN QUANT: CPT | Performed by: EMERGENCY MEDICINE

## 2018-04-07 PROCEDURE — 87040 BLOOD CULTURE FOR BACTERIA: CPT | Performed by: EMERGENCY MEDICINE

## 2018-04-07 PROCEDURE — 25000128 H RX IP 250 OP 636: Performed by: NEUROLOGICAL SURGERY

## 2018-04-07 PROCEDURE — 25000565 ZZH ISOFLURANE, EA 15 MIN: Performed by: NEUROLOGICAL SURGERY

## 2018-04-07 PROCEDURE — 61312 CRNEC/CRNOT STTL XDRL/SDRL: CPT | Performed by: NEUROLOGICAL SURGERY

## 2018-04-07 PROCEDURE — 99291 CRITICAL CARE FIRST HOUR: CPT | Performed by: INTERNAL MEDICINE

## 2018-04-07 PROCEDURE — 96361 HYDRATE IV INFUSION ADD-ON: CPT

## 2018-04-07 PROCEDURE — 87641 MR-STAPH DNA AMP PROBE: CPT | Performed by: INTERNAL MEDICINE

## 2018-04-07 PROCEDURE — 87800 DETECT AGNT MULT DNA DIREC: CPT | Performed by: EMERGENCY MEDICINE

## 2018-04-07 PROCEDURE — 25000128 H RX IP 250 OP 636: Performed by: NURSE ANESTHETIST, CERTIFIED REGISTERED

## 2018-04-07 PROCEDURE — A9270 NON-COVERED ITEM OR SERVICE: HCPCS | Mod: GY | Performed by: INTERNAL MEDICINE

## 2018-04-07 PROCEDURE — 87086 URINE CULTURE/COLONY COUNT: CPT | Performed by: EMERGENCY MEDICINE

## 2018-04-07 PROCEDURE — 87077 CULTURE AEROBIC IDENTIFY: CPT | Performed by: EMERGENCY MEDICINE

## 2018-04-07 PROCEDURE — 37000009 ZZH ANESTHESIA TECHNICAL FEE, EACH ADDTL 15 MIN: Performed by: NEUROLOGICAL SURGERY

## 2018-04-07 PROCEDURE — 37000008 ZZH ANESTHESIA TECHNICAL FEE, 1ST 30 MIN: Performed by: NEUROLOGICAL SURGERY

## 2018-04-07 PROCEDURE — 87640 STAPH A DNA AMP PROBE: CPT | Performed by: INTERNAL MEDICINE

## 2018-04-07 PROCEDURE — 86901 BLOOD TYPING SEROLOGIC RH(D): CPT | Performed by: EMERGENCY MEDICINE

## 2018-04-07 PROCEDURE — 25000125 ZZHC RX 250: Performed by: NURSE ANESTHETIST, CERTIFIED REGISTERED

## 2018-04-07 PROCEDURE — 36000069 ZZH SURGERY LEVEL 5 EA 15 ADDTL MIN: Performed by: NEUROLOGICAL SURGERY

## 2018-04-07 PROCEDURE — 70450 CT HEAD/BRAIN W/O DYE: CPT

## 2018-04-07 PROCEDURE — 81001 URINALYSIS AUTO W/SCOPE: CPT | Performed by: EMERGENCY MEDICINE

## 2018-04-07 PROCEDURE — 25000128 H RX IP 250 OP 636: Performed by: EMERGENCY MEDICINE

## 2018-04-07 PROCEDURE — 27210794 ZZH OR GENERAL SUPPLY STERILE: Performed by: NEUROLOGICAL SURGERY

## 2018-04-07 PROCEDURE — 36430 TRANSFUSION BLD/BLD COMPNT: CPT

## 2018-04-07 PROCEDURE — 99291 CRITICAL CARE FIRST HOUR: CPT | Performed by: PSYCHIATRY & NEUROLOGY

## 2018-04-07 PROCEDURE — 25000125 ZZHC RX 250: Performed by: NEUROLOGICAL SURGERY

## 2018-04-07 PROCEDURE — 70450 CT HEAD/BRAIN W/O DYE: CPT | Mod: 77

## 2018-04-07 PROCEDURE — 25000132 ZZH RX MED GY IP 250 OP 250 PS 637: Mod: GY | Performed by: INTERNAL MEDICINE

## 2018-04-07 PROCEDURE — 00C40ZZ EXTIRPATION OF MATTER FROM INTRACRANIAL SUBDURAL SPACE, OPEN APPROACH: ICD-10-PCS | Performed by: NEUROLOGICAL SURGERY

## 2018-04-07 PROCEDURE — 87088 URINE BACTERIA CULTURE: CPT | Performed by: EMERGENCY MEDICINE

## 2018-04-07 PROCEDURE — 93005 ELECTROCARDIOGRAM TRACING: CPT

## 2018-04-07 PROCEDURE — 85025 COMPLETE CBC W/AUTO DIFF WBC: CPT | Performed by: EMERGENCY MEDICINE

## 2018-04-07 PROCEDURE — 85610 PROTHROMBIN TIME: CPT | Performed by: EMERGENCY MEDICINE

## 2018-04-07 PROCEDURE — 20000003 ZZH R&B ICU

## 2018-04-07 PROCEDURE — 96365 THER/PROPH/DIAG IV INF INIT: CPT

## 2018-04-07 PROCEDURE — 40000275 ZZH STATISTIC RCP TIME EA 10 MIN

## 2018-04-07 PROCEDURE — 86850 RBC ANTIBODY SCREEN: CPT | Performed by: EMERGENCY MEDICINE

## 2018-04-07 PROCEDURE — 40000008 ZZH STATISTIC AIRWAY CARE

## 2018-04-07 PROCEDURE — 40000169 ZZH STATISTIC PRE-PROCEDURE ASSESSMENT I: Performed by: NEUROLOGICAL SURGERY

## 2018-04-07 PROCEDURE — 87186 SC STD MICRODIL/AGAR DIL: CPT | Performed by: EMERGENCY MEDICINE

## 2018-04-07 PROCEDURE — 83880 ASSAY OF NATRIURETIC PEPTIDE: CPT | Performed by: EMERGENCY MEDICINE

## 2018-04-07 PROCEDURE — C9132 KCENTRA, PER I.U.: HCPCS

## 2018-04-07 PROCEDURE — 27210995 ZZH RX 272: Performed by: NEUROLOGICAL SURGERY

## 2018-04-07 PROCEDURE — 40000986 XR CHEST PORT 1 VW

## 2018-04-07 PROCEDURE — 99285 EMERGENCY DEPT VISIT HI MDM: CPT | Mod: 25

## 2018-04-07 PROCEDURE — 25000125 ZZHC RX 250

## 2018-04-07 PROCEDURE — 36000067 ZZH SURGERY LEVEL 5 1ST 30 MIN: Performed by: NEUROLOGICAL SURGERY

## 2018-04-07 PROCEDURE — 82805 BLOOD GASES W/O2 SATURATION: CPT | Performed by: INTERNAL MEDICINE

## 2018-04-07 PROCEDURE — 25000555 ZZHC RX FACTOR IP 250 OP 636

## 2018-04-07 PROCEDURE — 25000128 H RX IP 250 OP 636: Performed by: INTERNAL MEDICINE

## 2018-04-07 PROCEDURE — 25000125 ZZHC RX 250: Performed by: INTERNAL MEDICINE

## 2018-04-07 PROCEDURE — 40000281 ZZH STATISTIC TRANSPORT TIME EA 15 MIN

## 2018-04-07 PROCEDURE — 25000301 ZZH OR RX SURGIFLO W/THROMBIN KIT 2ML 1991 OPNP: Performed by: NEUROLOGICAL SURGERY

## 2018-04-07 PROCEDURE — 25000128 H RX IP 250 OP 636: Performed by: ANESTHESIOLOGY

## 2018-04-07 PROCEDURE — 86900 BLOOD TYPING SEROLOGIC ABO: CPT | Performed by: EMERGENCY MEDICINE

## 2018-04-07 PROCEDURE — 93010 ELECTROCARDIOGRAM REPORT: CPT | Performed by: INTERNAL MEDICINE

## 2018-04-07 PROCEDURE — C1713 ANCHOR/SCREW BN/BN,TIS/BN: HCPCS | Performed by: NEUROLOGICAL SURGERY

## 2018-04-07 PROCEDURE — 80053 COMPREHEN METABOLIC PANEL: CPT | Performed by: EMERGENCY MEDICINE

## 2018-04-07 DEVICE — IMP SCR SYN MATRIX LOW PRO 1.5X04MM SELF DRILL 04.503.104.01: Type: IMPLANTABLE DEVICE | Site: SCALP | Status: FUNCTIONAL

## 2018-04-07 DEVICE — IMP PLATE SYN MATRIXNEURO STR 2 HOLE 12MM  04.503.062: Type: IMPLANTABLE DEVICE | Site: SCALP | Status: FUNCTIONAL

## 2018-04-07 DEVICE — IMP PLATE SYN BURR HOLE COVER 17MM 04.503.023: Type: IMPLANTABLE DEVICE | Site: SCALP | Status: FUNCTIONAL

## 2018-04-07 RX ORDER — LEVETIRACETAM 5 MG/ML
500 INJECTION INTRAVASCULAR ONCE
Status: DISCONTINUED | OUTPATIENT
Start: 2018-04-07 | End: 2018-04-07 | Stop reason: DRUGHIGH

## 2018-04-07 RX ORDER — ONDANSETRON 4 MG/1
4 TABLET, ORALLY DISINTEGRATING ORAL EVERY 6 HOURS PRN
Status: DISCONTINUED | OUTPATIENT
Start: 2018-04-07 | End: 2018-04-12 | Stop reason: HOSPADM

## 2018-04-07 RX ORDER — FENTANYL CITRATE 50 UG/ML
25-50 INJECTION, SOLUTION INTRAMUSCULAR; INTRAVENOUS
Status: DISCONTINUED | OUTPATIENT
Start: 2018-04-07 | End: 2018-04-07

## 2018-04-07 RX ORDER — NALOXONE HYDROCHLORIDE 0.4 MG/ML
.1-.4 INJECTION, SOLUTION INTRAMUSCULAR; INTRAVENOUS; SUBCUTANEOUS
Status: DISCONTINUED | OUTPATIENT
Start: 2018-04-07 | End: 2018-04-07

## 2018-04-07 RX ORDER — PROCHLORPERAZINE MALEATE 5 MG
5 TABLET ORAL EVERY 6 HOURS PRN
Status: DISCONTINUED | OUTPATIENT
Start: 2018-04-07 | End: 2018-04-07

## 2018-04-07 RX ORDER — METOCLOPRAMIDE 5 MG/1
5 TABLET ORAL EVERY 6 HOURS PRN
Status: DISCONTINUED | OUTPATIENT
Start: 2018-04-07 | End: 2018-04-07

## 2018-04-07 RX ORDER — BACITRACIN ZINC 500 [USP'U]/G
OINTMENT TOPICAL PRN
Status: DISCONTINUED | OUTPATIENT
Start: 2018-04-07 | End: 2018-04-07 | Stop reason: HOSPADM

## 2018-04-07 RX ORDER — ONDANSETRON 2 MG/ML
4 INJECTION INTRAMUSCULAR; INTRAVENOUS EVERY 6 HOURS PRN
Status: DISCONTINUED | OUTPATIENT
Start: 2018-04-07 | End: 2018-04-07

## 2018-04-07 RX ORDER — METOCLOPRAMIDE HYDROCHLORIDE 5 MG/ML
5 INJECTION INTRAMUSCULAR; INTRAVENOUS EVERY 6 HOURS PRN
Status: DISCONTINUED | OUTPATIENT
Start: 2018-04-07 | End: 2018-04-07

## 2018-04-07 RX ORDER — BUPIVACAINE HYDROCHLORIDE AND EPINEPHRINE 5; 5 MG/ML; UG/ML
INJECTION, SOLUTION PERINEURAL PRN
Status: DISCONTINUED | OUTPATIENT
Start: 2018-04-07 | End: 2018-04-07 | Stop reason: HOSPADM

## 2018-04-07 RX ORDER — PROCHLORPERAZINE MALEATE 5 MG
5 TABLET ORAL EVERY 6 HOURS PRN
Status: DISCONTINUED | OUTPATIENT
Start: 2018-04-07 | End: 2018-04-12 | Stop reason: HOSPADM

## 2018-04-07 RX ORDER — MAGNESIUM SULFATE HEPTAHYDRATE 40 MG/ML
4 INJECTION, SOLUTION INTRAVENOUS EVERY 4 HOURS PRN
Status: DISCONTINUED | OUTPATIENT
Start: 2018-04-07 | End: 2018-04-12 | Stop reason: HOSPADM

## 2018-04-07 RX ORDER — PROPOFOL 10 MG/ML
INJECTION, EMULSION INTRAVENOUS PRN
Status: DISCONTINUED | OUTPATIENT
Start: 2018-04-07 | End: 2018-04-07

## 2018-04-07 RX ORDER — CHLORHEXIDINE GLUCONATE ORAL RINSE 1.2 MG/ML
15 SOLUTION DENTAL EVERY 12 HOURS
Status: DISCONTINUED | OUTPATIENT
Start: 2018-04-07 | End: 2018-04-08

## 2018-04-07 RX ORDER — ONDANSETRON 4 MG/1
4 TABLET, ORALLY DISINTEGRATING ORAL EVERY 6 HOURS PRN
Status: DISCONTINUED | OUTPATIENT
Start: 2018-04-07 | End: 2018-04-07

## 2018-04-07 RX ORDER — TRAMADOL HYDROCHLORIDE 50 MG/1
50 TABLET ORAL EVERY 12 HOURS PRN
Status: ON HOLD | COMMUNITY
End: 2018-04-12

## 2018-04-07 RX ORDER — LEVETIRACETAM 10 MG/ML
1000 INJECTION INTRAVASCULAR ONCE
Status: COMPLETED | OUTPATIENT
Start: 2018-04-07 | End: 2018-04-07

## 2018-04-07 RX ORDER — POTASSIUM CHLORIDE 1.5 G/1.58G
20-40 POWDER, FOR SOLUTION ORAL
Status: DISCONTINUED | OUTPATIENT
Start: 2018-04-07 | End: 2018-04-12 | Stop reason: HOSPADM

## 2018-04-07 RX ORDER — SODIUM CHLORIDE 9 MG/ML
1000 INJECTION, SOLUTION INTRAVENOUS CONTINUOUS
Status: DISCONTINUED | OUTPATIENT
Start: 2018-04-07 | End: 2018-04-07

## 2018-04-07 RX ORDER — AMOXICILLIN 250 MG
2 CAPSULE ORAL 2 TIMES DAILY PRN
Status: DISCONTINUED | OUTPATIENT
Start: 2018-04-07 | End: 2018-04-12 | Stop reason: HOSPADM

## 2018-04-07 RX ORDER — POTASSIUM CL/LIDO/0.9 % NACL 10MEQ/0.1L
10 INTRAVENOUS SOLUTION, PIGGYBACK (ML) INTRAVENOUS
Status: DISCONTINUED | OUTPATIENT
Start: 2018-04-07 | End: 2018-04-12 | Stop reason: HOSPADM

## 2018-04-07 RX ORDER — POLYETHYLENE GLYCOL 3350 17 G/17G
17 POWDER, FOR SOLUTION ORAL DAILY PRN
Status: DISCONTINUED | OUTPATIENT
Start: 2018-04-07 | End: 2018-04-12 | Stop reason: HOSPADM

## 2018-04-07 RX ORDER — SODIUM CHLORIDE, SODIUM LACTATE, POTASSIUM CHLORIDE, CALCIUM CHLORIDE 600; 310; 30; 20 MG/100ML; MG/100ML; MG/100ML; MG/100ML
INJECTION, SOLUTION INTRAVENOUS CONTINUOUS
Status: DISCONTINUED | OUTPATIENT
Start: 2018-04-07 | End: 2018-04-07 | Stop reason: HOSPADM

## 2018-04-07 RX ORDER — NALOXONE HYDROCHLORIDE 0.4 MG/ML
.1-.4 INJECTION, SOLUTION INTRAMUSCULAR; INTRAVENOUS; SUBCUTANEOUS
Status: DISCONTINUED | OUTPATIENT
Start: 2018-04-07 | End: 2018-04-12 | Stop reason: HOSPADM

## 2018-04-07 RX ORDER — CEFAZOLIN SODIUM 2 G/100ML
2 INJECTION, SOLUTION INTRAVENOUS
Status: DISCONTINUED | OUTPATIENT
Start: 2018-04-07 | End: 2018-04-07

## 2018-04-07 RX ORDER — POTASSIUM CHLORIDE 1500 MG/1
20-40 TABLET, EXTENDED RELEASE ORAL
Status: DISCONTINUED | OUTPATIENT
Start: 2018-04-07 | End: 2018-04-12 | Stop reason: HOSPADM

## 2018-04-07 RX ORDER — CEFAZOLIN SODIUM 1 G
1 VIAL (EA) INJECTION SEE ADMIN INSTRUCTIONS
Status: DISCONTINUED | OUTPATIENT
Start: 2018-04-07 | End: 2018-04-07 | Stop reason: HOSPADM

## 2018-04-07 RX ORDER — METOCLOPRAMIDE 5 MG/1
5 TABLET ORAL EVERY 6 HOURS PRN
Status: DISCONTINUED | OUTPATIENT
Start: 2018-04-07 | End: 2018-04-12 | Stop reason: HOSPADM

## 2018-04-07 RX ORDER — DOCUSATE SODIUM 100 MG/1
100 CAPSULE, LIQUID FILLED ORAL 2 TIMES DAILY
Status: DISCONTINUED | OUTPATIENT
Start: 2018-04-07 | End: 2018-04-07

## 2018-04-07 RX ORDER — MAGNESIUM HYDROXIDE 1200 MG/15ML
LIQUID ORAL PRN
Status: DISCONTINUED | OUTPATIENT
Start: 2018-04-07 | End: 2018-04-07 | Stop reason: HOSPADM

## 2018-04-07 RX ORDER — ATROPINE SULFATE 0.1 MG/ML
INJECTION INTRAVENOUS
Status: COMPLETED
Start: 2018-04-07 | End: 2018-04-07

## 2018-04-07 RX ORDER — CEFAZOLIN SODIUM 2 G/100ML
2 INJECTION, SOLUTION INTRAVENOUS EVERY 8 HOURS
Status: DISCONTINUED | OUTPATIENT
Start: 2018-04-07 | End: 2018-04-09 | Stop reason: CLARIF

## 2018-04-07 RX ORDER — POTASSIUM CHLORIDE 7.45 MG/ML
10 INJECTION INTRAVENOUS
Status: DISCONTINUED | OUTPATIENT
Start: 2018-04-07 | End: 2018-04-12 | Stop reason: HOSPADM

## 2018-04-07 RX ORDER — BISACODYL 5 MG
5 TABLET, DELAYED RELEASE (ENTERIC COATED) ORAL DAILY PRN
Status: DISCONTINUED | OUTPATIENT
Start: 2018-04-07 | End: 2018-04-12 | Stop reason: HOSPADM

## 2018-04-07 RX ORDER — GLYCOPYRROLATE 0.2 MG/ML
INJECTION, SOLUTION INTRAMUSCULAR; INTRAVENOUS PRN
Status: DISCONTINUED | OUTPATIENT
Start: 2018-04-07 | End: 2018-04-07

## 2018-04-07 RX ORDER — CARVEDILOL 3.12 MG/1
3.12 TABLET ORAL 2 TIMES DAILY WITH MEALS
Status: DISCONTINUED | OUTPATIENT
Start: 2018-04-07 | End: 2018-04-07

## 2018-04-07 RX ORDER — SODIUM CHLORIDE AND POTASSIUM CHLORIDE 150; 900 MG/100ML; MG/100ML
INJECTION, SOLUTION INTRAVENOUS CONTINUOUS
Status: DISCONTINUED | OUTPATIENT
Start: 2018-04-07 | End: 2018-04-08

## 2018-04-07 RX ORDER — AMOXICILLIN 250 MG
1 CAPSULE ORAL 2 TIMES DAILY PRN
Status: DISCONTINUED | OUTPATIENT
Start: 2018-04-07 | End: 2018-04-12 | Stop reason: HOSPADM

## 2018-04-07 RX ORDER — ACETAMINOPHEN 325 MG/1
650 TABLET ORAL EVERY 4 HOURS PRN
Status: DISCONTINUED | OUTPATIENT
Start: 2018-04-07 | End: 2018-04-12 | Stop reason: HOSPADM

## 2018-04-07 RX ORDER — FENTANYL CITRATE 50 UG/ML
25-50 INJECTION, SOLUTION INTRAMUSCULAR; INTRAVENOUS
Status: DISCONTINUED | OUTPATIENT
Start: 2018-04-07 | End: 2018-04-12 | Stop reason: HOSPADM

## 2018-04-07 RX ORDER — TORSEMIDE 20 MG/1
20 TABLET ORAL DAILY
COMMUNITY
End: 2018-12-06

## 2018-04-07 RX ORDER — BISACODYL 5 MG
15 TABLET, DELAYED RELEASE (ENTERIC COATED) ORAL DAILY PRN
Status: DISCONTINUED | OUTPATIENT
Start: 2018-04-07 | End: 2018-04-12 | Stop reason: HOSPADM

## 2018-04-07 RX ORDER — ATROPINE SULFATE 0.1 MG/ML
0.5 INJECTION INTRAVENOUS ONCE
Status: COMPLETED | OUTPATIENT
Start: 2018-04-07 | End: 2018-04-07

## 2018-04-07 RX ORDER — ONDANSETRON 2 MG/ML
4 INJECTION INTRAMUSCULAR; INTRAVENOUS EVERY 6 HOURS PRN
Status: DISCONTINUED | OUTPATIENT
Start: 2018-04-07 | End: 2018-04-12 | Stop reason: HOSPADM

## 2018-04-07 RX ORDER — BISACODYL 5 MG
10 TABLET, DELAYED RELEASE (ENTERIC COATED) ORAL DAILY PRN
Status: DISCONTINUED | OUTPATIENT
Start: 2018-04-07 | End: 2018-04-12 | Stop reason: HOSPADM

## 2018-04-07 RX ORDER — PROCHLORPERAZINE 25 MG
12.5 SUPPOSITORY, RECTAL RECTAL EVERY 12 HOURS PRN
Status: DISCONTINUED | OUTPATIENT
Start: 2018-04-07 | End: 2018-04-07

## 2018-04-07 RX ORDER — POTASSIUM CHLORIDE 29.8 MG/ML
20 INJECTION INTRAVENOUS
Status: DISCONTINUED | OUTPATIENT
Start: 2018-04-07 | End: 2018-04-12 | Stop reason: HOSPADM

## 2018-04-07 RX ORDER — ONDANSETRON 4 MG/1
4 TABLET, ORALLY DISINTEGRATING ORAL EVERY 30 MIN PRN
Status: DISCONTINUED | OUTPATIENT
Start: 2018-04-07 | End: 2018-04-07

## 2018-04-07 RX ORDER — METOCLOPRAMIDE HYDROCHLORIDE 5 MG/ML
5 INJECTION INTRAMUSCULAR; INTRAVENOUS EVERY 6 HOURS PRN
Status: DISCONTINUED | OUTPATIENT
Start: 2018-04-07 | End: 2018-04-12 | Stop reason: HOSPADM

## 2018-04-07 RX ORDER — HYDROCODONE BITARTRATE AND ACETAMINOPHEN 5; 325 MG/1; MG/1
1-2 TABLET ORAL EVERY 4 HOURS PRN
Status: DISCONTINUED | OUTPATIENT
Start: 2018-04-07 | End: 2018-04-08

## 2018-04-07 RX ORDER — POTASSIUM CHLORIDE 750 MG/1
10 TABLET, EXTENDED RELEASE ORAL DAILY
Status: DISCONTINUED | OUTPATIENT
Start: 2018-04-07 | End: 2018-04-07

## 2018-04-07 RX ORDER — PROCHLORPERAZINE 25 MG
12.5 SUPPOSITORY, RECTAL RECTAL EVERY 12 HOURS PRN
Status: DISCONTINUED | OUTPATIENT
Start: 2018-04-07 | End: 2018-04-12 | Stop reason: HOSPADM

## 2018-04-07 RX ORDER — PROPOFOL 10 MG/ML
INJECTION, EMULSION INTRAVENOUS CONTINUOUS PRN
Status: DISCONTINUED | OUTPATIENT
Start: 2018-04-07 | End: 2018-04-07

## 2018-04-07 RX ORDER — HYDROMORPHONE HYDROCHLORIDE 1 MG/ML
.3-.5 INJECTION, SOLUTION INTRAMUSCULAR; INTRAVENOUS; SUBCUTANEOUS
Status: DISCONTINUED | OUTPATIENT
Start: 2018-04-07 | End: 2018-04-07 | Stop reason: ALTCHOICE

## 2018-04-07 RX ORDER — FENTANYL CITRATE 50 UG/ML
INJECTION, SOLUTION INTRAMUSCULAR; INTRAVENOUS PRN
Status: DISCONTINUED | OUTPATIENT
Start: 2018-04-07 | End: 2018-04-07

## 2018-04-07 RX ORDER — PROPOFOL 10 MG/ML
10-20 INJECTION, EMULSION INTRAVENOUS EVERY 30 MIN PRN
Status: DISCONTINUED | OUTPATIENT
Start: 2018-04-07 | End: 2018-04-08

## 2018-04-07 RX ORDER — SODIUM CHLORIDE, SODIUM LACTATE, POTASSIUM CHLORIDE, CALCIUM CHLORIDE 600; 310; 30; 20 MG/100ML; MG/100ML; MG/100ML; MG/100ML
INJECTION, SOLUTION INTRAVENOUS CONTINUOUS
Status: DISCONTINUED | OUTPATIENT
Start: 2018-04-07 | End: 2018-04-07

## 2018-04-07 RX ORDER — ONDANSETRON 2 MG/ML
4 INJECTION INTRAMUSCULAR; INTRAVENOUS EVERY 30 MIN PRN
Status: DISCONTINUED | OUTPATIENT
Start: 2018-04-07 | End: 2018-04-07

## 2018-04-07 RX ORDER — DOCUSATE SODIUM 100 MG/1
100 CAPSULE, LIQUID FILLED ORAL 2 TIMES DAILY
Status: DISCONTINUED | OUTPATIENT
Start: 2018-04-07 | End: 2018-04-12 | Stop reason: HOSPADM

## 2018-04-07 RX ORDER — HYDROMORPHONE HYDROCHLORIDE 1 MG/ML
.3-.5 INJECTION, SOLUTION INTRAMUSCULAR; INTRAVENOUS; SUBCUTANEOUS EVERY 5 MIN PRN
Status: DISCONTINUED | OUTPATIENT
Start: 2018-04-07 | End: 2018-04-07

## 2018-04-07 RX ORDER — ALBUTEROL SULFATE 0.83 MG/ML
2.5 SOLUTION RESPIRATORY (INHALATION)
Status: DISCONTINUED | OUTPATIENT
Start: 2018-04-07 | End: 2018-04-12 | Stop reason: HOSPADM

## 2018-04-07 RX ORDER — LIDOCAINE HYDROCHLORIDE 20 MG/ML
INJECTION, SOLUTION INFILTRATION; PERINEURAL PRN
Status: DISCONTINUED | OUTPATIENT
Start: 2018-04-07 | End: 2018-04-07

## 2018-04-07 RX ORDER — LEVOFLOXACIN 5 MG/ML
500 INJECTION, SOLUTION INTRAVENOUS EVERY 24 HOURS
Status: DISCONTINUED | OUTPATIENT
Start: 2018-04-07 | End: 2018-04-07

## 2018-04-07 RX ORDER — PROPOFOL 10 MG/ML
5-75 INJECTION, EMULSION INTRAVENOUS CONTINUOUS
Status: DISCONTINUED | OUTPATIENT
Start: 2018-04-07 | End: 2018-04-08

## 2018-04-07 RX ORDER — BISACODYL 10 MG
10 SUPPOSITORY, RECTAL RECTAL DAILY PRN
Status: DISCONTINUED | OUTPATIENT
Start: 2018-04-07 | End: 2018-04-12 | Stop reason: HOSPADM

## 2018-04-07 RX ADMIN — LIDOCAINE HYDROCHLORIDE 100 MG: 20 INJECTION, SOLUTION INFILTRATION; PERINEURAL at 15:19

## 2018-04-07 RX ADMIN — FENTANYL CITRATE 50 MCG: 50 INJECTION, SOLUTION INTRAMUSCULAR; INTRAVENOUS at 16:39

## 2018-04-07 RX ADMIN — PROPOFOL 100 MG: 10 INJECTION, EMULSION INTRAVENOUS at 15:19

## 2018-04-07 RX ADMIN — PHENYLEPHRINE HYDROCHLORIDE 0.15 MCG/KG/MIN: 10 INJECTION, SOLUTION INTRAMUSCULAR; INTRAVENOUS; SUBCUTANEOUS at 15:59

## 2018-04-07 RX ADMIN — PROPOFOL 50 MCG/KG/MIN: 10 INJECTION, EMULSION INTRAVENOUS at 16:45

## 2018-04-07 RX ADMIN — SODIUM CHLORIDE 1000 ML: 9 INJECTION, SOLUTION INTRAVENOUS at 13:09

## 2018-04-07 RX ADMIN — PROPOFOL 60 MG: 10 INJECTION, EMULSION INTRAVENOUS at 15:31

## 2018-04-07 RX ADMIN — ATROPINE SULFATE 0.5 MG: 0.1 INJECTION INTRAVENOUS at 18:55

## 2018-04-07 RX ADMIN — PROPOFOL 50 MCG/KG/MIN: 10 INJECTION, EMULSION INTRAVENOUS at 19:55

## 2018-04-07 RX ADMIN — ROCURONIUM BROMIDE 50 MG: 10 INJECTION INTRAVENOUS at 15:19

## 2018-04-07 RX ADMIN — FENTANYL CITRATE 100 MCG: 50 INJECTION, SOLUTION INTRAMUSCULAR; INTRAVENOUS at 15:19

## 2018-04-07 RX ADMIN — SODIUM CHLORIDE, POTASSIUM CHLORIDE, SODIUM LACTATE AND CALCIUM CHLORIDE: 600; 310; 30; 20 INJECTION, SOLUTION INTRAVENOUS at 14:49

## 2018-04-07 RX ADMIN — ATROPINE SULFATE 0.5 MG: 0.1 INJECTION, SOLUTION INTRAVENOUS at 18:55

## 2018-04-07 RX ADMIN — CEFAZOLIN SODIUM 2 G: 2 INJECTION, SOLUTION INTRAVENOUS at 15:10

## 2018-04-07 RX ADMIN — GLYCOPYRROLATE 0.2 MG: 0.2 INJECTION, SOLUTION INTRAMUSCULAR; INTRAVENOUS at 15:47

## 2018-04-07 RX ADMIN — CHLORHEXIDINE GLUCONATE 15 ML: 1.2 RINSE ORAL at 21:41

## 2018-04-07 RX ADMIN — CEFAZOLIN SODIUM 2 G: 2 INJECTION, SOLUTION INTRAVENOUS at 23:11

## 2018-04-07 RX ADMIN — FAMOTIDINE 20 MG: 10 INJECTION, SOLUTION INTRAVENOUS at 19:01

## 2018-04-07 RX ADMIN — SODIUM CHLORIDE 2.5 MG/HR: 9 INJECTION, SOLUTION INTRAVENOUS at 19:49

## 2018-04-07 RX ADMIN — POTASSIUM CHLORIDE AND SODIUM CHLORIDE: 900; 150 INJECTION, SOLUTION INTRAVENOUS at 19:00

## 2018-04-07 RX ADMIN — PHYTONADIONE 10 MG: 10 INJECTION, EMULSION INTRAMUSCULAR; INTRAVENOUS; SUBCUTANEOUS at 14:08

## 2018-04-07 RX ADMIN — LEVETIRACETAM 1000 G: 10 INJECTION INTRAVENOUS at 15:28

## 2018-04-07 RX ADMIN — PROPOFOL 50 MCG/KG/MIN: 10 INJECTION, EMULSION INTRAVENOUS at 18:00

## 2018-04-07 RX ADMIN — FENTANYL CITRATE 50 MCG: 50 INJECTION, SOLUTION INTRAMUSCULAR; INTRAVENOUS at 15:52

## 2018-04-07 RX ADMIN — PHENYLEPHRINE HYDROCHLORIDE 100 MCG: 10 INJECTION, SOLUTION INTRAMUSCULAR; INTRAVENOUS; SUBCUTANEOUS at 16:01

## 2018-04-07 ASSESSMENT — ENCOUNTER SYMPTOMS
SEIZURES: 0
NUMBNESS: 1
DYSRHYTHMIAS: 1

## 2018-04-07 NOTE — PHARMACY-ADMISSION MEDICATION HISTORY
Admission medication history interview status for the 4/7/2018  admission is complete. See EPIC admission navigator for prior to admission medications     Medication history source reliability:Moderate    Actions taken by pharmacist (provider contacted, etc):Discussed PTA med list with patient      Additional medication history information not noted on PTA med list :Unknown last doses of medications     Medication reconciliation/reorder completed by provider prior to medication history? Yes    Time spent in this activity: 15 minutes     Prior to Admission medications    Medication Sig Last Dose Taking? Auth Provider   torsemide (DEMADEX) 20 MG tablet Take 20 mg by mouth daily  Yes Unknown, Entered By History   traMADol (ULTRAM) 50 MG tablet Take 50 mg by mouth every 12 hours as needed  Yes Unknown, Entered By History   warfarin (COUMADIN) 5 MG tablet TAKE ONE TABLET BY MOUTH EVERY DAY EXCEPT SUNDAY  Yes Main Hardy MD   carvedilol (COREG) 3.125 MG tablet Take 1 tablet (3.125 mg) by mouth 2 times daily (with meals)  Yes Main Hardy MD   gabapentin (NEURONTIN) 600 MG tablet Take 1 tablet (600 mg) by mouth 2 times daily  Yes Main Hardy MD   potassium chloride SA (K-DUR/KLOR-CON M) 10 MEQ CR tablet Take 1 tablet (10 mEq) by mouth daily  Yes Main Hardy MD   ASPIRIN EC PO Take 81 mg by mouth daily  Yes Reported, Patient

## 2018-04-07 NOTE — ED NOTES
Bed: ED11  Expected date:   Expected time:   Means of arrival:   Comments:  [place] 2 AMS lethargic 73 male

## 2018-04-07 NOTE — ANESTHESIA PREPROCEDURE EVALUATION
Anesthesia Evaluation     . Pt has had prior anesthetic.     No history of anesthetic complications          ROS/MED HX    ENT/Pulmonary:      (-) sleep apnea   Neurologic: Comment: SDH     (-) seizures   Cardiovascular:     (+) Dyslipidemia, hypertension--CAD, -CABG-. Taking blood thinners : . CHF . fainting (syncope). :. dysrhythmias a-fib, . pulmonary hypertension, Previous cardiac testing Echodate:1/29/18results:Left Ventricle  The left ventricle is normal in size. There is mild concentric left  ventricular hypertrophy. Left ventricular systolic function is mildly reduced.  The visual ejection fraction is estimated at 40-45%. Diastolic function not  assessed due to atrial fibrillation. Flattened septum is consistent with RV  pressure/volume overload. Mid and distal septal, mid and distal anterior,  apical akinesis. There are regional wall motion abnormalities as specified.  There is no thrombus seen in the left ventricle.     Right Ventricle  The right ventricle is moderately dilated. Mildly decreased right ventricular  systolic function.     Atria  The left atrium is severely dilated. The right atrium is moderately dilated.     Mitral Valve  There is mild to moderate mitral annular calcification. The mitral valve  leaflets are mildly thickened. There is moderate (2+) mitral regurgitation.  ERO radius calculation by PISA methodology appeared inaccurate. The mean  mitral valve gradient is 3.5 mmHg. (65 bpm).        Tricuspid Valve  There is mild to moderate (1-2+) tricuspid regurgitation. The right  ventricular systolic pressure is approximated at 64.7 mmHg plus the right  atrial pressure. Right ventricular systolic pressure is elevated, consistent  with severe pulmonary hypertension. Dilated IVC (>2.5cm) with <50% respiratory  collapse; right atrial pressure is estimated at 15-20mmHg.     Aortic Valve  The aortic valve is trileaflet. No aortic regurgitation is present. Mild  valvular aortic stenosis. The mean  "AoV pressure gradient is 12.4 mmHg.     Pulmonic Valve  The pulmonic valve is not well seen, but is grossly normal. There is trace  pulmonic valvular regurgitation.     Vessels  Borderline aortic root dilatation. The ascending aorta is Mildly dilated.     Pericardium  There is no pericardial effusion.        Rhythm  The rhythm was atrial fibrillation.date: results: date: results: date: results:          METS/Exercise Tolerance:     Hematologic:     (+) History of blood clots pt is anticoagulated, -      Musculoskeletal:         GI/Hepatic:     (+) GERD Asymptomatic on medication, hiatal hernia, liver disease (ETOH),       Renal/Genitourinary:     (+) chronic renal disease, type: CRI,       Endo:      (-) Type II DM and thyroid disease   Psychiatric:         Infectious Disease:         Malignancy:         Other:                     Physical Exam  Normal systems: cardiovascular, pulmonary and dental    Airway   Mallampati: III  TM distance: >3 FB  Neck ROM: full    Dental     Cardiovascular       Pulmonary                     Anesthesia Plan      History & Physical Review  History and physical reviewed and following examination; no interval change.    ASA Status:  4 emergent.    NPO Status:  > 8 hours    Plan for General and ETT with Intravenous induction. Maintenance will be Balanced.      Additional equipment: Videolaryngoscope, 2nd IV and Arterial Line Received Kcentra and vitamin K in ER      Postoperative Care      Consents  Anesthetic plan, risks, benefits and alternatives discussed with:  Patient and Spouse..        Procedure: Procedure(s):  CRANIOTOMY  Preop diagnosis: unknown    Allergies   Allergen Reactions     Hmg-Coa-R Inhibitors Other (See Comments)     Rhabdomyolysis occurred within a couple days     Ciprofloxacin Itching     Severe itching \"like ants were crawling\"     Past Medical History:   Diagnosis Date     Alcoholism (H)      Amputated left leg (H)      Anemia      Anticardiolipin antibody " syndrome (H)      Antiplatelet or antithrombotic long-term use      Aortic stenosis      Arthritis      Balance problem      Cardiomyopathy (H)      Coagulation disorder (H)     cardiolipinantibody syndrome     Coronary artery disease      Gastro-oesophageal reflux disease      Heart attack 2007     Hiatal hernia      Hypercholesterolemia      Hypertension      Left foot drop      Liver disease     alcoholic liver disease, alcoholic cirrohsosis, portal hypertension     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)     ?When diagnosed, patient not aware of this     Moderate mitral regurgitation      Monoclonal gammopathy      Neuropathy      Noninfectious ileitis     diverticulitis of colon     PE (pulmonary embolism) 2006     Prostate cancer (H) 2000    Treated with radiation (seed implants in 2000) -- no problems since     Renal disease     kidney stones     Syncope      Thoracic aortic aneurysm, without rupture      Thrombocytopenia (H)      Thrombosis of leg      Urethral stricture      Past Surgical History:   Procedure Laterality Date     AMPUTATE LEG BELOW KNEE Left 04/2014    related to gangrene, started as foot ulcer related to neuropathy     AMPUTATE LEG BELOW KNEE Left 12/4/2017    Procedure: AMPUTATE LEG BELOW KNEE;  Exploration of Left Leg Below Knee Amputation Stump with Ligation of Bleeders.;  Surgeon: Leandro Arreola MD;  Location:  [facility]     APPENDECTOMY       APPLY WOUND VAC Left 12/6/2017    Procedure: APPLY WOUND VAC;;  Surgeon: Saima Howard MD;  [place]:  [place]     ARTHROPLASTY KNEE Right 9/19/2014    Procedure: ARTHROPLASTY KNEE;  Surgeon: Saima Howard MD;  Location:  OR     CARDIAC SURGERY      CABG     CHOLECYSTECTOMY       COLONOSCOPY       CYSTOSCOPY, DILATE URETHRA, COMBINED N/A 10/12/2016    Procedure: COMBINED CYSTOSCOPY, DILATE URETHRA;  Surgeon: Dimitry Bello MD;  Location:  OR     ENDOSCOPIC STRIPPING VEIN(S)       esophagogastroduodenoscopy       EYE SURGERY       cataracts     GENITOURINARY SURGERY      Prostate Seen Implants     HERNIA REPAIR      Umbilical     INCISION AND DRAINAGE LOWER EXTREMITY, COMBINED Left 12/1/2017    Procedure: COMBINED INCISION AND DRAINAGE LOWER EXTREMITY;  INCISION AND DRAINAGE OF LEFT BELOW KNEE AMPUTATION ABSCESS, WOUND VAC PLACEMENT;  Surgeon: Saima Howard MD;  Location:  OR     IR IVC FILTER PLACEMENT       IRRIGATION AND DEBRIDEMENT LOWER EXTREMITY, COMBINED Left 12/6/2017    Procedure: COMBINED IRRIGATION AND DEBRIDEMENT LOWER EXTREMITY;  IRRIGATION AND DEBRIDEMENT OF LEFT BELOW KNEE AMPUTATION SITE WITH WOUND VAC REPLACEMENT;  Surgeon: Saima Howard MD;  [facility]:  [place]     IRRIGATION AND DEBRIDEMENT LOWER EXTREMITY, COMBINED Left 12/8/2017    Procedure: COMBINED IRRIGATION AND DEBRIDEMENT LOWER EXTREMITY;  IRRIGATION AND DEBRIDEMENT OF LEFT BELOW THE KNEE AMPUTATION AND SHORTENING OF THE TIBIA WITH WOUND CLOSURE;  Surgeon: Saima Howard MD;  Location:  OR     ORTHOPEDIC SURGERY      (R) knee scope     ORTHOPEDIC SURGERY      total hip      ORTHOPEDIC SURGERY      elbow surgery     Prior to Admission medications    Medication Sig Start Date End Date Taking? Authorizing Provider   warfarin (COUMADIN) 5 MG tablet TAKE ONE TABLET BY MOUTH EVERY DAY EXCEPT [place] 3/15/18   Main Hardy MD   carvedilol (COREG) 3.125 MG tablet Take 1 tablet (3.125 mg) by mouth 2 times daily (with meals) 3/14/18   Main Hardy MD   gabapentin (NEURONTIN) 600 MG tablet Take 1 tablet (600 mg) by mouth 2 times daily 3/14/18   Main Hardy MD   potassium chloride SA (K-DUR/KLOR-CON M) 10 MEQ CR tablet Take 1 tablet (10 mEq) by mouth daily 1/6/18   Main Hardy MD   ASPIRIN EC PO Take 81 mg by mouth daily    Reported, Patient     Current Facility-Administered Medications Ordered in Epic   Medication Dose Route Frequency Last Rate Last Dose     sodium chloride 0.9% infusion  1,000 mL Intravenous Continuous         lidocaine 1 % 1  mL  1 mL Other Q1H PRN         lactated ringers infusion   Intravenous Continuous 25 mL/hr at 04/07/18 1449       levETIRAcetam (KEPPRA) intermittent infusion 500 mg  500 mg Intravenous Once         No current Epic-ordered outpatient prescriptions on file.     Wt Readings from Last 1 Encounters:   03/14/18 98.9 kg (218 lb)     Temp Readings from Last 1 Encounters:   04/07/18 36.6  C (97.9  F) (Oral)     BP Readings from Last 6 Encounters:   04/07/18 (!) 162/101   03/14/18 128/64   02/05/18 149/71   01/29/18 123/58   01/05/18 127/67   12/22/17 164/87     Pulse Readings from Last 4 Encounters:   04/07/18 58   03/14/18 66   02/05/18 64   01/28/18 79     Resp Readings from Last 1 Encounters:   04/07/18 18     SpO2 Readings from Last 1 Encounters:   04/07/18 97%     Recent Labs   Lab Test  04/07/18   1240  01/28/18   2247   NA  141  137   POTASSIUM  4.0  4.1   CHLORIDE  109  104   CO2  24  27   ANIONGAP  8  6   GLC  87  96   BUN  17  17   CR  0.86  1.24   BENNIE  9.3  8.4*     Recent Labs   Lab Test  04/07/18   1240  01/28/18   2247   WBC  5.2  5.4   HGB  11.3*  9.5*   PLT  150  168     Recent Labs   Lab Test  04/07/18   1240  01/28/18   2247   INR  1.96*  3.61*      RECENT LABS:   ECG:   ECHO:   CXR:                        .

## 2018-04-07 NOTE — ANESTHESIA POSTPROCEDURE EVALUATION
Patient: Antonio Ramirez    Procedure(s):  Right Craniotomy For Subdural Hematoma - Wound Class: I-Clean    Diagnosis:unknown  Diagnosis Additional Information: No value filed.    Anesthesia Type:  General, ETT    Note:  Anesthesia Post Evaluation    Patient location during evaluation: ICU  Patient participation: Unable to evaluate secondary to administered sedation  Post-procedure mental status: sedated.  Pain management: adequate  Airway patency: patent  Cardiovascular status: hemodynamically stable  Respiratory status: ventilator and ETT  Hydration status: acceptable  PONV: none     Anesthetic complications: None    Comments: Full report given to intensivist and RN.          Last vitals:  Vitals:    04/07/18 1700 04/07/18 1715 04/07/18 1730   BP:      Pulse:      Resp:  14 14   Temp:   36.2  C (97.2  F)   SpO2: 97% 100% 100%         Electronically Signed By: Von Pittman MD  April 7, 2018  6:00 PM

## 2018-04-07 NOTE — OP NOTE
OPERATIVE NOTE       Pre op Diagnosis:   1. Right frontotemporal parietal subacute subdural hematoma with midline shift and left hemiparesis and mental status change   2. Brain swelling  3. Brain shift  4. Coumadin use  5. Liver cirrhosis     Post op Diagnosis: Same     Procedure:  1. Right frontoparietal craniotomy for evacuation of subdural hematoma    Surgeon: Jono Issa MD     Assistant: Bhavin Angulo    Indication for procedure: The patient is a 73 year old male that presented to [facility] ER with the above clinical and radiographic findings. After reviewing the patient's imaging studies and examination, the decision was made to proceed with the above procedure. The patient and son understood the risks and benefits of surgery and wanted to proceed.     Description of Procedure:   The patient and family in the pre op area and the consent was signed and the right  cranium was initialed. The patient was transported to the operating room on a stretcher. He received general endotracheal anesthesia and was then placed on the operating room table in the supine position with the head turned to the right. The head was then prepped and draped in a normal sterile fashion. A curvilinear incision was made from the right frontal area just behind the hairline down to the right posterior area with a 10 blade scalpel. This was performed after local anesthesia was injected along the planned incision. Dissection continued down through the subcutaneous tissue through the galea. The galea and the temporalis muscle were then retracted and two patricia holes were placed with the Midas Omega drill. The patricia holes were then undermined with the Kerrison rongeur and the Penfield 3 dissector was used to dissect the dura away from the bone. Next, the Midas Omega drill with the B1 and foot plate was then used to connect the patricia holes and the bone flap was turned without difficulty. Upon removing the bone, the patient was noted to have tense dura  and the dura was incised with the 11 blade scalpel in the right frontal region and a significant amount of liquified of subacute subdural blood was evacuated. The patient was also noted to have several membranes surrounding the brain and more subdural blood. At that point the dura was opened in a curvilinear fashion and the brain was exposed and the remainder of the subdural blood was evacuated. Once the dura was opened and the membrane was partially resected and the subdural blood was evacuated, the brain appeared to remain effaced to the left. The brain was irrigated once again and the dura was then reapproximated with 4-0 Nurolon and a SEBASTIAN drain was left in the subdural space. The wound was irrigated once again andemostasis was applied to the temporalis muscle and the temporalis muscle was reapproximated and the galea was then closed with 2-0 Vicryl and the skin was then closed with staples. The patient's wound was dressed appropriately with bacitracin ointment. The patient was then moved from the operating table to the stretcher, extubated and then sent to the ICU.    At the end of the case all counts were correct.     Complications - none    Estimated blood loss - 25 ml    IV fluids - see Anesthesia report    The patient received Ancef and Levaquin preoperatively        Jono Issa MD,MS, FAANS

## 2018-04-07 NOTE — CONSULTS
[facility]  NeuroCritical Care Consult    Patient Name:  Antonio Ramirez  MRN:  [id number]    :  1945  Date of Admission:  2018  Date of Service:  2018    Chief Complaint: RIGHT subdural hematoma status post evacuation    HPI:  73 year old man presented with a right subdural hematoma. Per report the patient had a fall this morning for which EMS was initially called and he refused transport. He is better increasingly lethargic and fatigued throughout the rest of the day for which EMS was called again. This time he was taken to the emergency room for evaluation. He has a history of atrial fibrillation for which he is on Coumadin    ROS:   Unable to perform due to sedation    Past Medical History:   Past Medical History:   Diagnosis Date     Alcoholism (H)      Amputated left leg (H)      Anemia      Anticardiolipin antibody syndrome (H)      Antiplatelet or antithrombotic long-term use      Aortic stenosis      Arthritis      Balance problem      Cardiomyopathy (H)      Coagulation disorder (H)     cardiolipinantibody syndrome     Coronary artery disease      Gastro-oesophageal reflux disease      Heart attack      Hiatal hernia      Hypercholesterolemia      Hypertension      Left foot drop      Liver disease     alcoholic liver disease, alcoholic cirrohsosis, portal hypertension     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)     ?When diagnosed, patient not aware of this     Moderate mitral regurgitation      Monoclonal gammopathy      Neuropathy      Noninfectious ileitis     diverticulitis of colon     PE (pulmonary embolism)      Prostate cancer (H)     Treated with radiation (seed implants in ) -- no problems since     Renal disease     kidney stones     Syncope      Thoracic aortic aneurysm, without rupture      Thrombocytopenia (H)      Thrombosis of leg      Urethral stricture      Past Surgical History:   Procedure Laterality Date     AMPUTATE LEG  BELOW KNEE Left 04/2014    related to gangrene, started as foot ulcer related to neuropathy     AMPUTATE LEG BELOW KNEE Left 12/4/2017    Procedure: AMPUTATE LEG BELOW KNEE;  Exploration of Left Leg Below Knee Amputation Stump with Ligation of Bleeders.;  Surgeon: Leandro Arreola MD;  Location:  OR     APPENDECTOMY       APPLY WOUND VAC Left 12/6/2017    Procedure: APPLY WOUND VAC;;  Surgeon: Saima Howard MD;  Location:  SD     ARTHROPLASTY KNEE Right 9/19/2014    Procedure: ARTHROPLASTY KNEE;  Surgeon: Saima Howard MD;  Location:  OR     CARDIAC SURGERY      CABG     CHOLECYSTECTOMY       COLONOSCOPY       CYSTOSCOPY, DILATE URETHRA, COMBINED N/A 10/12/2016    Procedure: COMBINED CYSTOSCOPY, DILATE URETHRA;  Surgeon: Dimitry Bello MD;  Location:  OR     ENDOSCOPIC STRIPPING VEIN(S)       esophagogastroduodenoscopy       EYE SURGERY      cataracts     GENITOURINARY SURGERY      Prostate Seen Implants     HERNIA REPAIR      Umbilical     INCISION AND DRAINAGE LOWER EXTREMITY, COMBINED Left 12/1/2017    Procedure: COMBINED INCISION AND DRAINAGE LOWER EXTREMITY;  INCISION AND DRAINAGE OF LEFT BELOW KNEE AMPUTATION ABSCESS, WOUND VAC PLACEMENT;  Surgeon: Saima Howard MD;  Location:  OR     IR IVC FILTER PLACEMENT       IRRIGATION AND DEBRIDEMENT LOWER EXTREMITY, COMBINED Left 12/6/2017    Procedure: COMBINED IRRIGATION AND DEBRIDEMENT LOWER EXTREMITY;  IRRIGATION AND DEBRIDEMENT OF LEFT BELOW KNEE AMPUTATION SITE WITH WOUND VAC REPLACEMENT;  Surgeon: Saima Howard MD;  Location: [facility]     IRRIGATION AND DEBRIDEMENT LOWER EXTREMITY, COMBINED Left 12/8/2017    Procedure: COMBINED IRRIGATION AND DEBRIDEMENT LOWER EXTREMITY;  IRRIGATION AND DEBRIDEMENT OF LEFT BELOW THE KNEE AMPUTATION AND SHORTENING OF THE TIBIA WITH WOUND CLOSURE;  Surgeon: Saima Howard MD;  Location:  OR     ORTHOPEDIC SURGERY      (R) knee scope     ORTHOPEDIC SURGERY      total hip      ORTHOPEDIC SURGERY   "    elbow surgery       Medications:  Prescriptions Prior to Admission   Medication Sig Dispense Refill Last Dose     torsemide (DEMADEX) 20 MG tablet Take 20 mg by mouth daily        traMADol (ULTRAM) 50 MG tablet Take 50 mg by mouth every 12 hours as needed        warfarin (COUMADIN) 5 MG tablet TAKE ONE TABLET BY MOUTH EVERY DAY EXCEPT  79 tablet 0      carvedilol (COREG) 3.125 MG tablet Take 1 tablet (3.125 mg) by mouth 2 times daily (with meals) 180 tablet 1      gabapentin (NEURONTIN) 600 MG tablet Take 1 tablet (600 mg) by mouth 2 times daily 90 tablet 1      potassium chloride SA (K-DUR/KLOR-CON M) 10 MEQ CR tablet Take 1 tablet (10 mEq) by mouth daily 90 tablet 3 Taking     ASPIRIN EC PO Take 81 mg by mouth daily   Taking       Allergies:   Allergies   Allergen Reactions     Hmg-Coa-R Inhibitors Other (See Comments)     Rhabdomyolysis occurred within a couple days     Ciprofloxacin Itching     Severe itching \"like ants were crawling\"       Family History:  Family history reviewed with patient and is noncontributory.    Social History:  I have reviewed this patient's social history and updated it with pertinent information if needed. Antonio Ramirez  reports that he has never smoked. He has never used smokeless tobacco. He reports that he drinks alcohol. He reports that he does not use illicit drugs.  Significant alcohol use, concern for withdrawal    24 Hour Vital Signs Summary:  Temperatures:  Current - Temp: 97.2  F (36.2  C); Max - Temp  Av.6  F (36.4  C)  Min: 97.2  F (36.2  C)  Max: 97.9  F (36.6  C)  Respiration range: Resp  Av.8  Min: 11  Max: 25  Pulse range: Pulse  Av.5  Min: 51  Max: 58  Blood pressure range: Systolic (24hrs), Av , Min:159 , Max:188   ; Diastolic (24hrs), Av, Min:80, Max:106    Pulse oximetry range: SpO2  Av %  Min: 91 %  Max: 100 %    Ventilator Settings  Ventilation Mode: CMV/AC  (Continuous Mandatory Ventilation/ Assist Control)  Rate Set " (breaths/minute): 14 breaths/min  Tidal Volume Set (mL): 530 mL  PEEP (cm H2O): 5 cmH2O  Oxygen Concentration (%): 50 %  Resp: 14      Intake/Output Summary (Last 24 hours) at 04/07/18 1821  Last data filed at 04/07/18 1718   Gross per 24 hour   Intake              900 ml   Output               30 ml   Net              870 ml       Physical Examination:  BP (!) 162/101  Pulse 58  Temp 97.2  F (36.2  C) (Axillary)  Resp 14  Wt 86.1 kg (189 lb 13.1 oz)  SpO2 100%  BMI 25.74 kg/m2  GENERAL APPEARANCE ADULT: Sedated on propofol  NEURO: Sedated, pupils equal and reactive to light 2 mm, corneal reflex intact, cough gag intact  EYES: PERRL, conjunctiva and lids normal  HENT: Mouth and posterior oropharynx normal, moist mucous membranes  RESP: lungs clear to auscultation, intubated  CV: regular rate and rhythm, no murmur, rub, or gallop  ABDOMEN: normal bowel sounds, soft, nontender, no hepatosplenomegaly or other masses  MSK: Unable to test due to sedation  SKIN: no suspicious lesions or rashes    Labs/Studies:  CMP  Recent Labs  Lab 04/07/18  1240      POTASSIUM 4.0   CHLORIDE 109   CO2 24   ANIONGAP 8   GLC 87   BUN 17   CR 0.86   GFRESTIMATED 86   GFRESTBLACK >90   BENNIE 9.3   PROTTOTAL 7.7   ALBUMIN 3.5   BILITOTAL 2.2*   ALKPHOS 93   AST 30   ALT 17     CBC  Recent Labs  Lab 04/07/18  1240   WBC 5.2   RBC 4.42   HGB 11.3*   HCT 35.8*   MCV 81   MCH 25.6*   MCHC 31.6   RDW 19.2*        INR  Recent Labs  Lab 04/07/18  1240   INR 1.96*     Arterial Blood Gas  Recent Labs  Lab 04/07/18  1737   PH 7.39   PCO2 35   PO2 161*   HCO3 21       Cultures:   Blood culture:  Invalid input(s): BC   Urine culture:  No results for input(s): URC in the last 168 hours.    Imaging:   [] Initial head CT with large right subdural hematoma.  Postoperative head CT with resolution of large hematoma and resultant pneumocephalus with subdural drain in place      Assessment/Plan  Neuro:  Subdural hematoma-status post evacuation,  cycle high flow oxygen utilizing nonrebreather alternating with room air every 2 hours to help with pneumocephalus, Keppra 500 mg twice daily given trauma/seizure prophylaxis  Alcohol dependence-we will monitor for withdrawal symptoms and treat appropriately    CV:    SBP goal: Less than 140  Atrial fibrillation-would start at least aspirin when able from neurosurgical perspective, hold on anticoagulation until seen by neurosurgery and follow-up.    Pulm:  Intubated postoperatively-working toward extubation in the ICU    DVT ppx: SCD  GI ppx: [] Not currently indicated  CODE STATUS: Full  Disposition: ICU    Code: Full Code    Delgdao Genao MD  Vascular Neurology/Neurocritical Care  Text Page - [phone]    Neurocritical care time:  I spent 35 minutes bedside and on the inpatient unit today managing the neurocritical care of Antonio Ramirez in relation to the issues listed in this note. Patient is critically ill / has very high risk of deterioration

## 2018-04-07 NOTE — ED PROVIDER NOTES
History     Chief Complaint:  Fall and Generalized Weakness    HPI   History is limited as patient is a poor historian and response is delayed and limited to questions.   Antonio Ramirez is a 73 year old male, with a complicated past medical history including CAD, prostate cancer, alcoholism, cirrhotic liver disease, aortic stenosis amongst others and currently anticoagulated on Coumadin for atrial fibrillation, who presents via EMS to the emergency department. Per EMS report, patient had a fall at 0445 this morning out of bed, in which EMS was initially called, but patient refused transport at the time, but has been increasingly lethargic and fatigued throughout the day, prompting wife to call EMS for transport. Per EMS, strong odor of UTI was noted in the room.     Here in the ED, patient does not respond to questions readily, He does answer yes and no but very delayed. Patient only acknowledges numbness and tingling in bilateral upper extremities with a nod. He denies any chest or abdominal pain. He states he has a headache.    Allergies:  Hmg-Coa-R inhibitors  Ciprofloxacin     Medications:    Coumadin  Coreg  Neurontin  Potassium chloride SA  Aspirin     Past Medical History:    Alcoholism  Alcoholic cirrhosis   Amputated left leg  Anemia  Anticardiolipin antibody syndrome  Antiplatelet or antithrombotic long-term use  Aortic stenosis  Arthritis  Atrial fibrillation  Balance problem  CKD  Cardiomyopathy  Coagulation disorder  GERD  Heart attack  Hiatal hernia   Hypercholesterolemia  Hypertension  Left foot drop  Liver disease  Low grade B cell lymphoproliferative disorder  Moderate mitral regurgitation  Monoclonal gammopathy  Neuropathy  Noninfectious ileitis  PE  Prostate cancer  Renal disease  Syncope  Thoracic aortic aneurysm, without rupture  Thrombocytopenia  Thrombosis of leg  Urethral stricture     Past Surgical History:    Amputate leg below knee (left)   Appendectomy  Apply wound vac  Arthroplasty  knee  Cardiac surgery - CABG  Cholecystectomy  Colonoscopy   Cytoscopy, dilate urethra, combined  Endoscopic stripping vein(s)  Esophagogastroduodenoscopy  Eye surgery - cataracts  Genitourinary surgery - prostate seen implants  Hernia repair - umbilical  I & D lower extremity, combined   IR IVC filter placement  Irrigation and debridement lower extremity, combined x2  Orthopedic surgery - R knee scope  Orthopedic surgery - total hip  Orthopedic surgery - elbow surgery    Family History:    Lung cancer  Heart failure    Social History:  The patient was accompanied to the ED by EMS.  Smoking Status: No  Smokeless Tobacco: No  Alcohol Use: Yes   Marital Status:   [2]     Review of Systems   Unable to perform ROS: Patient unresponsive   Neurological: Positive for numbness (and tingling in upper bilateral extremities).     Physical Exam   Vitals:  Patient Vitals for the past 24 hrs:   BP Temp Temp src Pulse Heart Rate Resp SpO2   04/07/18 1415 - - - - - - 96 %   04/07/18 1410 (!) 178/101 - - - 56 20 97 %   04/07/18 1405 (!) 162/92 - - - 56 11 97 %   04/07/18 1400 (!) 159/100 - - - 60 11 93 %   04/07/18 1345 165/89 - - - 50 25 98 %   04/07/18 1330 171/80 - - - 46 15 98 %   04/07/18 1327 166/90 - - - 58 19 98 %   04/07/18 1300 (!) 162/93 - - - 54 16 97 %   04/07/18 1244 (!) 188/106 97.9  F (36.6  C) Oral 51 - 18 91 %      Physical Exam  Physical Exam   Constitutional:  Elderly frail male lying supine in bed.   HENT:       Mouth/Throat:   Oropharynx is clear and moist. No tongue laceration.   Eyes:    Conjunctivae normal and EOM are normal. Pupils are 2mm and equal, round, and reactive to light.  He has full extraocular movements.  Neck:    Normal range of motion.   Cardiovascular: Normal rate, irregular rhythm and normal heart sounds.  Exam reveals no gallop and no friction rub.  He had a soft heart murmur.  Pulmonary/Chest:  Patient has no wheezes. Patient has no rales. Lungs diminished bilaterally secondary to  effort.   Abdominal:   Soft. Bowel sounds are normal. Patient exhibits no mass. Non-tender, no bruising. There is no rebound and no guarding.   Musculoskeletal:  Normal range of motion. Patient exhibits no edema.   Neurological:   Patient is awake but somnolent. No facial droop. Oriented to person only. Significantly delayed cognitive defect. Answers one word answers. Has 4/5  strength bilaterally. States altered sensation to soft touch bilaterally of his upper extremities. Left foot has been previously amputated secondary to non-healing ulcer. Has bilateral weakness of his legs. Cannot lift either leg up with gravity.    Skin:   Skin is warm and dry. No rash noted. No erythema.   Psychiatric:   Unable to assess.     Emergency Department Course     ECG:  ECG taken at 1242, ECG read at 1257 by Dr. Helm  Atrial fibrillation with slow ventricular response  Abnormal ECG  Rate 59 bpm. CO interval *. QRS duration 84. QT/QTc 460/455. P-R-T axes * 63 67.     Imaging:  Radiology findings were communicated with the patient who voiced understanding of the findings.  CT Head w/o Contrast:  IMPRESSION: There is a new mixed density subdural collection along the  right cerebral convexity measuring 2.7 cm in maximum thickness  resulting in at least 1.9 cm of leftward midline shift of the septum  pellucidum, subfalcine herniation of the right frontal lobe towards  the left, and probable transtentorial herniation bilaterally. This  most likely represents a mixed age subdural hematoma.    The ventricles and basal cisterns are within normal limits in  configuration given the degree of cerebral volume loss. No mass or  recent infarct. There is mild diffuse cerebral volume loss.    The visualized paranasal sinuses are well-aerated. There is no  mastoiditis. There are no fractures of the visualized bones.    IMPRESSION:   1. New large subdural collection along the right cerebral convexity  resulting in severe left or midline shift  of the septum pellucidum,  subfalcine herniation of the right frontal lobe towards the left, and  probable bilateral transtentorial herniation.   2. Diffuse cerebral volume loss and cerebral white matter changes  consistent with chronic small vessel ischemic disease.    Reading per radiology.     Laboratory:  Laboratory findings were communicated with the patient who voiced understanding of the findings.  CBC: HGB 11.3 (L) o/w WNL (WBC 5.2, )  CMP: Bilirubin Total 2.2 (H) o/w WNL (Creatinine 0.86)  BNP: 4334 (H)  INR: 1.96 (H)  Troponin (Collected 1240): <0.015   Blood Cultures: Pending  Alcohol ethyl: <0.01    Interventions:  1309 0.9% NaCl Bolus 1000 mL IV  1408 Phytonadione 10 mg IV  1422 K-Centra Infusion 2300 Units IV   FFP ordered, transferred to OR prior to arrival    Emergency Department Course:  Nursing notes and vitals reviewed.  IV was inserted and blood was drawn for laboratory testing, results above.  The patient was sent for a CT Head w/o Contrast while in the emergency department, results above.   EKG obtained in the ED, see results above.       12:54 PM: I performed an exam of the patient as documented above. History limited and supplemented by EMS report.   1:22 PM: I spoke with Radiology service regarding patient's presentation, findings, and plan of care. Patient has a subdural brain bleed.  1:33 PM. I spoke with Radiology service regarding patient's presentation, findings, and plan of care.   1:36 PM: I updated patient and discussed plan of care. Patient was able to answer more questions and reported he did fall today and does have a headache.   1:44 PM: I spoke with Dr. Issa of the Neurosurgery service regarding patient's presentation, findings, and plan of care. Patient will go emergently to the OR.  1:49 PM: I spoke with Pharmacy service regarding patient's presentation, findings, and plan of care. Requested k-centra STAT.   2:20 PM: K-centra arrived in ED.   2:26 PM: Patient left the  ED.  2:30 PM: Notified by [facility] that Dr. Chiang of the Hospitalist service notes he will have another fellow hospitalist see the patient after surgery, since this was emergent   3:05 PM: I spoke with Dr. Chiang of the Hospitalist service regarding patient's presentation, findings, and plan of care.    I discussed the treatment plan with the patient. They expressed understanding of this plan and consented to admission. I discussed the patient with Dr. Chiang and he indicated that another fellow hospitalist will accept patient post-surgery to a monitored bed for further evaluation and treatment.     I personally reviewed the laboratory results with the Patient and answered all related questions prior to admission.    Impression & Plan      Medical Decision Making:  Antonio Ramirez is a 73 year old gentleman presenting after multiple falls. He fell out of bed this morning and apparently could not get back up so EMS was called but patient refused transport. Due to cognitive delay and altered mental status today, the wife called medics for transport. On the patient's initial history, the wife was not present, and it was very difficult as he was very delayed in answering and was lethargic. He appeared to have generalized diffused weakness. He is unable to lift his legs off the bed. He had equal, but weak  strength, but bilateral sensory deficits. Patient was sent to CAT scan immediately where there was found to be a significant right sided subdural hematoma with midline shift and herniation. The patient's INR is elevated at 1.96 as well. He is in AFIB. It's bradycardia. He is hypertensive.     His heart rate has been going down into the 40's. As soon as I spoke with the radiologist regarding the patient's head bleed with significant shift and herniation, and saw the decreasing heart rate and the elevated blood pressure which is concerning for Cushing's response, we moved him to stabilization room 3 and I spoke to Dr. Issa  immediately. I ordered reversal of his anticoagulation, however due to his significant altered mental status and worsening vital signs, I felt the patient needed emergent neurosurgery consultation. Dr. Issa was able to review the images. The patient and the wife would like everything done at this point if possible, so Dr. Issa will take him emergently to the OR for craniotomy and hematoma evacuation. Vitamin K was started in the emergency department. K-centra was brought up just as the patient was transferred to the OR. FFP was ordered, but did not arrive prior to the patient's transport. Anesthesiology was in the emergency department to assess the patient prior to transport to OR. At this time, his heart rate is atrial fibrillation, low 50's. Blood pressure 160.     Critical Care time was 45 minutes for this patient excluding procedures.     Diagnosis:    ICD-10-CM    1. Subdural hemorrhage (H) I62.00    2. Coagulopathy (H) D68.9         Disposition:   Admission.    Scribe Disclosure:  Jannet COUCH, am serving as a scribe at 12:54 PM on 4/7/2018 to document services personally performed by Nicole Helm MD, based on my observations and the provider's statements to me.  4/7/2018    EMERGENCY DEPARTMENT       Nicole Helm MD  04/08/18 1041

## 2018-04-07 NOTE — ANESTHESIA CARE TRANSFER NOTE
Patient: Antonio Ramirez    Procedure(s):  Right Craniotomy For Subdural Hematoma - Wound Class: I-Clean    Diagnosis: unknown  Diagnosis Additional Information: No value filed.    Anesthesia Type:   General, ETT     Note:  Airway :ETT  Patient transferred to:ICU  ICU Handoff: Call for PAUSE to initiate/utilize ICU HANDOFF, Identified Patient, Identified Responsible Provider, Reviewed the Pertinent Medical History, Discussed Surgical Course, Reviewed Intra-OP Anesthesia Management and Issues during Anesthesia, Set Expectations for Post Procedure Period and Allowed Opportunity for Questions and Acknowledgement of Understanding      Vitals: (Last set prior to Anesthesia Care Transfer)    CRNA VITALS  4/7/2018 1634 - 4/7/2018 1718      4/7/2018             Resp Rate (set): 10                Electronically Signed By: DIPAK Salmeron CRNA  April 7, 2018  5:18 PM

## 2018-04-07 NOTE — IP AVS SNAPSHOT
[address] [facility]    [address]    [place] [address]    Phone:  [phone]                                       After Visit Summary   4/7/2018    Antonio Ramirez    MRN: [id number]           After Visit Summary Signature Page     I have received my discharge instructions, and my questions have been answered. I have discussed any challenges I see with this plan with the nurse or doctor.    ..........................................................................................................................................  Patient/Patient Representative Signature      ..........................................................................................................................................  Patient Representative Print Name and Relationship to Patient    ..................................................               ................................................  Date                                            Time    ..........................................................................................................................................  Reviewed by Signature/Title    ...................................................              ..............................................  Date                                                            Time

## 2018-04-07 NOTE — IP AVS SNAPSHOT
MRN:[id number]                      After Visit Summary   4/7/2018    Antonio Ramirez    MRN: [id number]           Thank you!     Thank you for choosing [facility] for your care. Our goal is always to provide you with excellent care. Hearing back from our patients is one way we can continue to improve our services. Please take a few minutes to complete the written survey that you may receive in the mail after you visit with us. Thank you!        Patient Information     Date Of Birth          1945        Designated Caregiver       Most Recent Value    Caregiver    Will someone help with your care after discharge? no    Name of designated caregiver Helena Ramirez      About your hospital stay     You were admitted on:  April 7, 2018 You last received care in the:  [facility] [address]    You were discharged on:  April 12, 2018        Reason for your hospital stay       ICH                  Who to Call     For medical emergencies, please call 911.  For non-urgent questions about your medical care, please call your primary care provider or clinic, [phone]  For questions related to your surgery, please call your surgery clinic        Attending Provider     Provider Specialty    Nicole Helm MD Emergency Medicine    [place], Dony Ortiz MD Internal Medicine    Luis Manuel, Jono Pang MD Neurosurgery    Jaymie, Nidia Salazar MD Pulmonary    HogueMariely MD Internal Medicine       Primary Care Provider Office Phone # Fax #    Main Hardy [phone]      After Care Instructions     Activity - Up ad kalyani           Activity - Up with assistive device           Activity - Up with nursing assistance           Additional Discharge Instructions       No coumadin/anticoagulants or ASA / NSAIDs for 4 weeks, until CT head done and okayed by Neurosurgery if stable head CT   In case of absolute need for anticoagulation secondary to new event ,  Neurosurgery recommends anticoagulation wothout bolus ( patient already has IVC)            Advance Diet as Tolerated       Follow this diet upon discharge: Orders Placed This Encounter      Snacks/Supplements Adult: Other - Please comment; Vanilla Magic Shake; Between Meals      Combination Diet Dysphagia Diet Level 2: Mechan Altered; Thin Liquids (water, ice chips, juice, milk gelatin, ice cream, etc)            Daily weights       Call Provider for weight gain of more than 2 pounds per day or 5 pounds per week.            Fall precautions           General info for SNF       Length of Stay Estimate: Short Term Care: Estimated # of Days <30  Condition at Discharge: Improving  Level of care:skilled   Rehabilitation Potential: Good  Admission H&P remains valid and up-to-date: Yes  Recent Chemotherapy: N/A  Use Nursing Home Standing Orders: Yes            Intake and output       Every shift            Mantoux instructions       Give two-step Mantoux (PPD) Per Facility Policy Yes            Wound care (specify)       Site:   Head    Remove incision staples on or about 4/18 at [facility].    Instructions:  Keep your incision clean and dry at all times.  OK to remove dressing on postop day 2.  OK to shower on postop day 3 and allow water to run over incision, pat dry after shower.  No bathing swimming or submerging in water.  Call immediately or come to ED if any drainage occurs, or you develop new pain, redness, or swelling.                  Follow-up Appointments     Follow Up and recommended labs and tests       [facility] weeks with repeat CT of head and appt with nurse practitioner as scheduled below.            Follow Up and recommended labs and tests       Follow up with ARU  physician.  The following labs/tests are recommended: CT head in 4 weeks.  FU with Neurosurgery in 4 weeks with head CT    Follow up with PCP Dr Hardy within a week after discharge from  acute rehab.                  Your next 10 appointments already scheduled     May 02, 2018 12:10 PM CDT   Return Visit with Gracy Hall NP   [facility] ([facility])    [address] [place] [address] [id number] [phone]              Additional Services     Occupational Therapy Adult Consult       Evaluate and treat as clinically indicated.    Reason:  Intracranial hemorrhage            Physical Therapy Adult Consult       Evaluate and treat as clinically indicated.    Reason:  Intracranial hemorrhage            Speech Language Path Adult Consult       Evaluate and treat as clinically indicated.    Reason:  Intracranial hemorrhage                  Future tests that were ordered for you     AntiEmbolism Stockings       Bilateral below knee length.On in the morning, off at night            Pneumatic Compression Device        Bilateral calf. Remove 30 mins BID.            CT Head w/o contrast*                 Pending Results     Date and Time Order Name Status Description    4/10/2018 2204 Blood culture Preliminary     4/10/2018 2204 Blood culture Preliminary     4/7/2018 1246 Blood culture Preliminary     4/7/2018 1246 Blood culture Preliminary             Statement of Approval     Ordered          04/12/18 0820  I have reviewed and agree with all the recommendations and orders detailed in this document.  EFFECTIVE NOW     Approved and electronically signed by:  Keshia Black MD             Admission Information     Date & Time Provider Department Dept. Phone    4/7/2018 Mariely Hogue MD [facility] [phone]      Your Vitals Were     Blood Pressure Pulse Temperature Respirations Weight Pulse Oximetry    128/73 (BP Location: Right arm) 52 97.9  F (36.6  C) (Oral) 16 83.4 kg (183 lb 12.8 oz) 95%    BMI (Body Mass Index)                   24.93 kg/m2           MyChart Information     [employer] gives you secure access  to your electronic health record. If you see a primary care provider, you can also send messages to your care team and make appointments. If you have questions, please call your primary care clinic.  If you do not have a primary care provider, please call [phone] and they will assist you.        Care EveryWhere ID     This is your Care EveryWhere ID. This could be used by other organizations to access your [facility] medical records  [id number]        Equal Access to Services     ANTHONY DORANTES : Hadii peyton munguia hadasho Soomaali, waaxda luqadaha, qaybta kaalmada adeegyada, igor mix. So [facility] [phone].    ATENCIÓN: Si habla juan daniel, tiene a shrestha disposición servicios gratuitos de asistencia lingüística. Llame al [phone].    We comply with applicable federal civil rights laws and [place] laws. We do not discriminate on the basis of race, color, national origin, age, disability, sex, sexual orientation, or gender identity.               Review of your medicines      START taking        Dose / Directions    levETIRAcetam 500 MG tablet   Commonly known as:  KEPPRA   Used for:  Subdural hemorrhage (H)        Dose:  500 mg   Take 1 tablet (500 mg) by mouth 2 times daily for 14 days   Refills:  0         CONTINUE these medicines which may have CHANGED, or have new prescriptions. If we are uncertain of the size of tablets/capsules you have at home, strength may be listed as something that might have changed.        Dose / Directions    carvedilol 6.25 MG tablet   Commonly known as:  COREG   This may have changed:    - medication strength  - how much to take  - when to take this   Used for:  Coronary artery disease involving native heart with angina pectoris, unspecified vessel or lesion type (H)        Dose:  6.25 mg   Take 1 tablet (6.25 mg) by mouth 2 times daily   Quantity:  60 tablet   Refills:  0         CONTINUE these medicines which have NOT CHANGED        Dose / Directions     torsemide 20 MG tablet   Commonly known as:  DEMADEX        Dose:  20 mg   Take 20 mg by mouth daily   Refills:  0         STOP taking     ASPIRIN EC PO           gabapentin 600 MG tablet   Commonly known as:  NEURONTIN           potassium chloride SA 10 MEQ CR tablet   Commonly known as:  K-DUR/KLOR-CON M           traMADol 50 MG tablet   Commonly known as:  ULTRAM           warfarin 5 MG tablet   Commonly known as:  COUMADIN                Where to get your medicines      Some of these will need a paper prescription and others can be bought over the counter. Ask your nurse if you have questions.     You don't need a prescription for these medications     carvedilol 6.25 MG tablet    levETIRAcetam 500 MG tablet                Protect others around you: Learn how to safely use, store and throw away your medicines at www.disposemymeds.org.             Medication List: This is a list of all your medications and when to take them. Check marks below indicate your daily home schedule. Keep this list as a reference.      Medications           Morning Afternoon Evening Bedtime As Needed    carvedilol 6.25 MG tablet   Commonly known as:  COREG   Take 1 tablet (6.25 mg) by mouth 2 times daily   Last time this was given:  6.25 mg on 4/12/2018  9:24 AM                                levETIRAcetam 500 MG tablet   Commonly known as:  KEPPRA   Take 1 tablet (500 mg) by mouth 2 times daily for 14 days   Last time this was given:  500 mg on 4/12/2018  9:26 AM                                torsemide 20 MG tablet   Commonly known as:  DEMADEX   Take 20 mg by mouth daily   Last time this was given:  20 mg on 4/12/2018  9:24 AM

## 2018-04-07 NOTE — LETTER
Transition Communication Hand-off for Care Transitions to Next Level of Care Provider    Name: Antonio aRmirez  : 1945  MRN #: [id number]  Primary Care Provider: Main Hardy  Primary Care MD Name: Antony  Primary Clinic: [facility] [address]  [place] [address]  Primary Care Clinic Name: [facility]  Reason for Hospitalization:  Subdural hemorrhage (H) [I62.00]  Coagulopathy (H) [D68.9]  Admit Date/Time: 2018 12:37 PM  Discharge Date: 2018  Payor Source: Payor: MEDICARE / Plan: MEDICARE / Product Type: Medicare /     Readmission Assessment Measure (MANSOOR) Risk Score/category: elevated    Plan of Care Goals/Milestone Events: SDH after mechanical fall OOB    Reason for Communication Hand-off Referral: Multiple providers/specialties  Other dc to [facility], warfarin on hold    Discharge Plan:  Discharge Plan:       Most Recent Value    Concerns To Be Addressed all concerns addressed in this encounter           Concern for non-adherence with plan of care:   Y/N no    Discharge Needs Assessment:  Needs       Most Recent Value    Equipment Currently Used at Home grab bar, cane, straight, walker, rolling    Transportation Available car, family or friend will provide    # of Referrals Placed by [facility] Internal Clinic Care Coordination, Specialty Providers, Post Acute Facilities, Scheduled Follow-up appointments          Follow-up specialty is recommended: Yes    Follow-up plan:  Future Appointments  Date Time Provider Department Center                        2018 12:10 PM Gracy Hall, NP [facility]       Any outstanding tests or procedures:        Radiology & Cardiology Orders     Future Labs/Procedures Complete By Expires    CT Head w/o contrast*  2018 (Approximate) 7/10/2018    Process Instructions:    Administration of IV contrast (contrast agent, dose, and amount) will be tailored to this examination per the appropriate written protocol listed in the Protocol  "E-Book, or by the supervising imaging provider.         Referrals     Future Labs/Procedures    Occupational Therapy Adult Consult     Comments:    Evaluate and treat as clinically indicated.    Reason:  Intracranial hemorrhage    Physical Therapy Adult Consult     Comments:    Evaluate and treat as clinically indicated.    Reason:  Intracranial hemorrhage    Speech Language Path Adult Consult     Comments:    Evaluate and treat as clinically indicated.    Reason:  Intracranial hemorrhage        Supplies     Future Labs/Procedures    AntiEmbolism Stockings     Comments:    Bilateral below knee length.On in the morning, off at night    Pneumatic Compression Device      Comments:    Bilateral calf. Remove 30 mins BID.          Key Recommendations:    Pt with ETOH hx but drinking \"less\" per family.  Had mechanical fall OOB with SDH which required crani evac.  He is going to [facility] for rehab today.  His coumadin is on hold until he is cleared by neurosurg with a repeat HCT in 4 weeks.  He is A&O and did not go through any ETOH withdrawal.     Sumaya Daily    AVS/Discharge Summary is the source of truth; this is a helpful guide for improved communication of patient story          "

## 2018-04-07 NOTE — ED NOTES
"[facility]  ED Nurse Handoff Report    ED Chief complaint: Fall (Had a fall at 445AM this morning, EMS was called and helped him up but he refused transport at that time. Is on blood thinners. Over past 48 hours has had increasing lethargy. Strong odor of UTI in room at home per EMS) and Generalized Weakness      ED Diagnosis:   Final diagnoses:   None       Code Status: Full Code per prior status, not addressed at this time    Allergies:   Allergies   Allergen Reactions     Hmg-Coa-R Inhibitors Other (See Comments)     Rhabdomyolysis occurred within a couple days     Ciprofloxacin Itching     Severe itching \"like ants were crawling\"       Activity level - Baseline/Home:  Independent    Activity Level - Current:   has not been up out of bed     Needed?: No    Isolation: No  Infection: Not Applicable    Bariatric?: No    Vital Signs:   Vitals:    04/07/18 1244 04/07/18 1300   BP: (!) 188/106 (!) 162/93   Pulse: 51    Resp: 18 16   Temp: 97.9  F (36.6  C)    TempSrc: Oral    SpO2: 91% 97%       Cardiac Rhythm:  Cardiac  Cardiac Rhythm: Atrial fibrillation    Pain level:      Is this patient confused?:  Yes, pt makes eye contact, short term and long term memory loss    Patient Report: Initial Complaint: fall, SDH  Focused Assessment: Pt had a fall at 0400, EMS called but pt refused to come to hospital, wife reported increased lethargy, EMS called and brought pt in.  Pt on Warfarin, hx a-fib.  Pt on PIV 18 G left AC.  Had falls earlier this week.  Tests Performed: CT, labs  Abnormal Results: INR 1.96  Treatments provided: NS bolus infusing,    Family Comments: Wife at bedside    OBS brochure/video discussed/provided to patient: N/A    ED Medications:   Medications   0.9% sodium chloride BOLUS (1,000 mLs Intravenous New Bag 4/7/18 1309)     Followed by   sodium chloride 0.9% infusion (not administered)       Drips infusing?:  No    For the majority of the shift this patient was Green. "   Interventions performed were none.    Severe Sepsis OR Septic Shock Diagnosis Present: No      ED NURSE PHONE NUMBER: [phone]

## 2018-04-07 NOTE — ANESTHESIA PROCEDURE NOTES
ARTERIAL LINE PROCEDURE NOTE:   Pre-Procedure  Performed by PING GARZA  Location: pre-op      Pre-Anesthestic Checklist: patient identified, IV checked, site marked, risks and benefits discussed, informed consent, monitors and equipment checked, pre-op evaluation, at physician/surgeon's request and post-op pain management    Timeout  Correct Patient: Yes   Correct Procedure: Yes   Correct Site: Yes   Correct Laterality: Yes   Correct Position: Yes   Site Marked: Yes   .   Procedure Documentation  Procedure: arterial line    Supine  Insertion Site:radial, left.Skin infiltrated with 2 mL of 1% lidocaine. .  .  Patient Prep;all elements of maximal sterile barrier technique followed, mask, hat, sterile gloves, draped, hand hygiene, chlorhexidine gluconate and isopropyl alcohol    Assessment/Narrative    Catheter: 20 gauge, 1.75 in/4.5 cm quick cath (integral wire)      Tegaderm dressing used.    Arterial waveform: Yes IBP within 10% of NIBP: Yes    Comments:  Pt tolerated procedure well   No complications

## 2018-04-07 NOTE — H&P
"[facility] [place]    History and Physical  [facility] Intensive Care     Date of Admission:  4/7/2018    Assessment & Plan   Antonio Ramirez is a 73 year old male who presents with subdural hematoma from a fall    Neurology:  Right Subdural hematoma: sustained after fall. S/p OR for craniotomy. Drain in place. Neuro ICU consult.   Sedation: propofol for now. Per daughter pt has self-extubated in the past.  Alcohol abuse: drinking \"less\" per family report. Has remote history of withdrawal per daughter, but none recent. Will monitor for signs/sympotms of withdrawal, propofol for now.   Peripheral polyneuropathy: nerve root impingement as potential etiology.   -propofol- wean sedation tonight as tolerated,    Cardiovascular:  Paroxysmal Afib: slow ventricular response. Hold home carvedilol.  HFrEF: 1/2018 40-45%, mild reduced RV systolic function, NT pro BNP 4334. Doesn't appear to be decompensated at this time, but will need to be careful with IVF.   Aortic stenosis and mitral regurg: not an acute issue  CAD: prior CAB.  Hyperlipidemia: previous statin intolerance complicated by rhabdomyolysis and renal failure (including a period of time requiring HD). Not an acute issue.  - Tele to monitor heart rate.   -no maint IVF for now  -hold home carvedilol    Pulmonary:        Postoperative ventilator management: wean sedation and work toward pressure support trial for extubation.   -    Renal  CKD: baseline CKD but creatinine good on admission. Does have a previous episode of ANSELMO resulting in HD which resolved (2010). Repeat BMP in am.  -    ID:         Periop antibiotics: cefazolin given presence of drain.  Concern for UTI: based on malodorous urine in exam room on presentation to ER. Order for levofloxacin placed, but note cefazolin has good spectrum of activity against common UTI pathogens so will continue with cefazolin only and d/c levofloxacin. Urine culture pending.  -cefazolin    GI  Cirrhosis with " ascites, etoh: known history, with prior paracenteses. Liver enzymes normal except for mild elevation of Tbili. Monitor.  GERD: famotidine for GI PPx while intubated.  -    Nutrition  NPO: advance diet if possible once extubated.  -    Endocrine:  Risk for stress hyperglycemia: SSI if needed  -    Heme/Onc:  Antiphospholipid antibody syndrome, IVC filter in place, chronic warfarin: received reversal agents including Prothrombin complex concentrates and vitamin K. Was 1.96 for INR on admission. Will need to carefully consider anticoagulation plan moving forward given high risk for throbmosis.  Diffuse large B Cell Lymphoma: not an active issue  Prostate cancer: not an active issue  -    MSK:  Left BKA: with previous stump abscess. No issues currently.    DVT Prophylaxis: Pneumatic Compression Devices  GI Prophylaxis: H2 Blocker    Restraints: Restraints for medical healing needed: YES    Family update by me today: Yes     Nidia Coon    Time Spent on this Encounter   Billing:  I spent 60 minutes bedside and on the inpatient unit today managing the critical care of Antonio Ramirez in relation to the issues listed in this note.    Code Status   Full Code    Primary Care Physician   Main Hardy    Chief Complaint   Fall and altered mental status    History is obtained from the electronic health record, Care Everywhere and daughter.    History of Present Illness   Antonio Ramirez is a 73 year old male who presents with subdural hematoma. He sustained a fall early this morning (fell out of bed). EMS was called but pt initially refused transport to he ER. Unfortunately he became increasingly lethargic and his wife called EMS again and was transported to the ER. He was essentially unresponsive. Head CT showed a subdural hematoma on the right with leftward shift. He was taken to the OR for evacuation and underwent craniotomy. He was brought to the ICU intubated for postoperative management.    Past  Medical History    I have reviewed this patient's medical history and updated it with pertinent information if needed.   Past Medical History:   Diagnosis Date     Alcoholism (H)      Amputated left leg (H)      Anemia      Anticardiolipin antibody syndrome (H)      Antiplatelet or antithrombotic long-term use      Aortic stenosis      Arthritis      Balance problem      Cardiomyopathy (H)      Coagulation disorder (H)     cardiolipinantibody syndrome     Coronary artery disease      Gastro-oesophageal reflux disease      Heart attack 2007     Hiatal hernia      Hypercholesterolemia      Hypertension      Left foot drop      Liver disease     alcoholic liver disease, alcoholic cirrohsosis, portal hypertension     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)     ?When diagnosed, patient not aware of this     Moderate mitral regurgitation      Monoclonal gammopathy      Neuropathy      Noninfectious ileitis     diverticulitis of colon     PE (pulmonary embolism) 2006     Prostate cancer (H) 2000    Treated with radiation (seed implants in 2000) -- no problems since     Renal disease     kidney stones     Syncope      Thoracic aortic aneurysm, without rupture      Thrombocytopenia (H)      Thrombosis of leg      Urethral stricture        Past Surgical History   I have reviewed this patient's surgical history and updated it with pertinent information if needed.  Past Surgical History:   Procedure Laterality Date     AMPUTATE LEG BELOW KNEE Left 04/2014    related to gangrene, started as foot ulcer related to neuropathy     AMPUTATE LEG BELOW KNEE Left 12/4/2017    Procedure: AMPUTATE LEG BELOW KNEE;  Exploration of Left Leg Below Knee Amputation Stump with Ligation of Bleeders.;  Surgeon: Leandro Arreola MD;  Location:  OR     APPENDECTOMY       APPLY WOUND VAC Left 12/6/2017    Procedure: APPLY WOUND VAC;;  Surgeon: Saima Howard MD;  Location:  SD     ARTHROPLASTY KNEE Right 9/19/2014    Procedure:  ARTHROPLASTY KNEE;  Surgeon: Saima Howard MD;  Location:  OR     CARDIAC SURGERY      CABG     CHOLECYSTECTOMY       COLONOSCOPY       CYSTOSCOPY, DILATE URETHRA, COMBINED N/A 10/12/2016    Procedure: COMBINED CYSTOSCOPY, DILATE URETHRA;  Surgeon: Dimitry Bello MD;  Location:  OR     ENDOSCOPIC STRIPPING VEIN(S)       esophagogastroduodenoscopy       EYE SURGERY      cataracts     GENITOURINARY SURGERY      Prostate Seen Implants     HERNIA REPAIR      Umbilical     INCISION AND DRAINAGE LOWER EXTREMITY, COMBINED Left 12/1/2017    Procedure: COMBINED INCISION AND DRAINAGE LOWER EXTREMITY;  INCISION AND DRAINAGE OF LEFT BELOW KNEE AMPUTATION ABSCESS, WOUND VAC PLACEMENT;  Surgeon: Saima Howard MD;  Location:  OR     IR IVC FILTER PLACEMENT       IRRIGATION AND DEBRIDEMENT LOWER EXTREMITY, COMBINED Left 12/6/2017    Procedure: COMBINED IRRIGATION AND DEBRIDEMENT LOWER EXTREMITY;  IRRIGATION AND DEBRIDEMENT OF LEFT BELOW KNEE AMPUTATION SITE WITH WOUND VAC REPLACEMENT;  Surgeon: Saima Howard MD;  Location: [facility]     IRRIGATION AND DEBRIDEMENT LOWER EXTREMITY, COMBINED Left 12/8/2017    Procedure: COMBINED IRRIGATION AND DEBRIDEMENT LOWER EXTREMITY;  IRRIGATION AND DEBRIDEMENT OF LEFT BELOW THE KNEE AMPUTATION AND SHORTENING OF THE TIBIA WITH WOUND CLOSURE;  Surgeon: Saima Howard MD;  Location:  OR     ORTHOPEDIC SURGERY      (R) knee scope     ORTHOPEDIC SURGERY      total hip      ORTHOPEDIC SURGERY      elbow surgery       Prior to Admission Medications   Prior to Admission Medications   Prescriptions Last Dose Informant Patient Reported? Taking?   ASPIRIN EC PO  Self Yes No   Sig: Take 81 mg by mouth daily   carvedilol (COREG) 3.125 MG tablet   No No   Sig: Take 1 tablet (3.125 mg) by mouth 2 times daily (with meals)   gabapentin (NEURONTIN) 600 MG tablet   No No   Sig: Take 1 tablet (600 mg) by mouth 2 times daily   potassium chloride SA (K-DUR/KLOR-CON M) 10 MEQ CR tablet  Self No  "No   Sig: Take 1 tablet (10 mEq) by mouth daily   warfarin (COUMADIN) 5 MG tablet   No No   Sig: TAKE ONE TABLET BY MOUTH EVERY DAY EXCEPT       Facility-Administered Medications: None     Allergies   Allergies   Allergen Reactions     Hmg-Coa-R Inhibitors Other (See Comments)     Rhabdomyolysis occurred within a couple days     Ciprofloxacin Itching     Severe itching \"like ants were crawling\"       Social History   I have reviewed this patient's social history and updated it with pertinent information if needed. Antonio Ramirez  reports that he has never smoked. He has never used smokeless tobacco. He reports that he drinks alcohol. He reports that he does not use illicit drugs.    Family History   I have reviewed this patient's family history and updated it with pertinent information if needed.   Family History   Problem Relation Age of Onset     Lung Cancer Mother      Heart Failure Father       age 87       Review of Systems   Review of systems not obtained due to patient factors - intubation    Physical Exam   Temp: 97.9  F (36.6  C) Temp src: Oral Temp  Min: 97.9  F (36.6  C)  Max: 97.9  F (36.6  C) BP: (!) 162/101 Pulse: 58 Heart Rate: 60 Resp: 18 SpO2: 97 % O2 Device: None (Room air)    Vital Signs with Ranges  Temp:  [97.9  F (36.6  C)] 97.9  F (36.6  C)  Pulse:  [51-58] 58  Heart Rate:  [46-60] 60  Resp:  [11-25] 18  BP: (159-188)/() 162/101  Arterial Line BP: (173)/(57) 173/57  SpO2:  [91 %-99 %] 97 %  0 lbs 0 oz    Constitutional: sedated and appears stated age  Eyes: pupils 3mm, react slowly, equal bilaterally.   ENT: dressing in place over right head. Endotracheal tube in place.  Hematologic / Lymphatic: no cervical lymphadenopathy and no supraclavicular lymphadenopathy  Respiratory: No increased work of breathing, good air exchange, clear to auscultation bilaterally, no crackles or wheezing  Cardiovascular: bradycardic, irregularly irregular, normal S1 and S2 and no edema  GI: soft, " nt/nd. BS+  Genitounirinary: perineum normal and lainez catheter in place  Musculoskeletal: right BKA, normal muscle tone by passive exam  Neurologic: sedated, unresponsive.    Data   Results for orders placed or performed during the hospital encounter of 04/07/18 (from the past 24 hour(s))   CBC with platelets differential   Result Value Ref Range    WBC 5.2 4.0 - 11.0 10e9/L    RBC Count 4.42 4.4 - 5.9 10e12/L    Hemoglobin 11.3 (L) 13.3 - 17.7 g/dL    Hematocrit 35.8 (L) 40.0 - 53.0 %    MCV 81 78 - 100 fl    MCH 25.6 (L) 26.5 - 33.0 pg    MCHC 31.6 31.5 - 36.5 g/dL    RDW 19.2 (H) 10.0 - 15.0 %    Platelet Count 150 150 - 450 10e9/L    Diff Method Automated Method     % Neutrophils 57.1 %    % Lymphocytes 20.5 %    % Monocytes 15.1 %    % Eosinophils 4.8 %    % Basophils 2.3 %    % Immature Granulocytes 0.2 %    Nucleated RBCs 0 0 /100    Absolute Neutrophil 3.0 1.6 - 8.3 10e9/L    Absolute Lymphocytes 1.1 0.8 - 5.3 10e9/L    Absolute Monocytes 0.8 0.0 - 1.3 10e9/L    Absolute Eosinophils 0.3 0.0 - 0.7 10e9/L    Absolute Basophils 0.1 0.0 - 0.2 10e9/L    Abs Immature Granulocytes 0.0 0 - 0.4 10e9/L    Absolute Nucleated RBC 0.0    Comprehensive metabolic panel   Result Value Ref Range    Sodium 141 133 - 144 mmol/L    Potassium 4.0 3.4 - 5.3 mmol/L    Chloride 109 94 - 109 mmol/L    Carbon Dioxide 24 20 - 32 mmol/L    Anion Gap 8 3 - 14 mmol/L    Glucose 87 70 - 99 mg/dL    Urea Nitrogen 17 7 - 30 mg/dL    Creatinine 0.86 0.66 - 1.25 mg/dL    GFR Estimate 86 >60 mL/min/1.7m2    GFR Estimate If Black >90 >60 mL/min/1.7m2    Calcium 9.3 8.5 - 10.1 mg/dL    Bilirubin Total 2.2 (H) 0.2 - 1.3 mg/dL    Albumin 3.5 3.4 - 5.0 g/dL    Protein Total 7.7 6.8 - 8.8 g/dL    Alkaline Phosphatase 93 40 - 150 U/L    ALT 17 0 - 70 U/L    AST 30 0 - 45 U/L   INR   Result Value Ref Range    INR 1.96 (H) 0.86 - 1.14   Troponin I   Result Value Ref Range    Troponin I ES <0.015 0.000 - 0.045 ug/L   Nt probnp inpatient (BNP)   Result  Value Ref Range    N-Terminal Pro BNP Inpatient 4334 (H) 0 - 1800 pg/mL   ABO/Rh type and screen   Result Value Ref Range    ABO O     RH(D) Pos     Antibody Screen Neg     Test Valid Only At [facility]        Specimen Expires 04/10/2018    Alcohol ethyl   Result Value Ref Range    Ethanol g/dL <0.01 <0.01 g/dL   EKG 12-lead, tracing only   Result Value Ref Range    Interpretation ECG Click View Image link to view waveform and result    CT Head w/o Contrast   Result Value Ref Range    Radiologist flags Intracranial hemorrhage (AA)     Narrative    CT OF THE HEAD WITHOUT CONTRAST 4/7/2018 1:21 PM     COMPARISON: Head CT 1/28/2018.    HISTORY: Fall, on coumadin.    TECHNIQUE: 5 mm thick axial CT images of the head were acquired  without IV contrast material.      Impression    IMPRESSION: There is a new mixed density subdural collection along the  right cerebral convexity measuring 2.7 cm in maximum thickness  resulting in at least 1.9 cm of leftward midline shift of the septum  pellucidum, subfalcine herniation of the right frontal lobe towards  the left, and probable transtentorial herniation bilaterally. This  most likely represents a mixed age subdural hematoma.    The ventricles and basal cisterns are within normal limits in  configuration given the degree of cerebral volume loss. No mass or  recent infarct. There is mild diffuse cerebral volume loss.    The visualized paranasal sinuses are well-aerated. There is no  mastoiditis. There are no fractures of the visualized bones.    IMPRESSION:   1. New large subdural collection along the right cerebral convexity  resulting in severe left or midline shift of the septum pellucidum,  subfalcine herniation of the right frontal lobe towards the left, and  probable bilateral transtentorial herniation.   2. Diffuse cerebral volume loss and cerebral white matter changes  consistent with chronic small vessel ischemic disease.      [Critical Result:  Intracranial hemorrhage]    Finding was identified on 4/7/2018 1:26 PM.     HARESH SORIA was contacted by Dr. Crenshaw on 4/7/2018 1:26 PM  and verbalized understanding of the critical result.     Radiation dose for this scan was reduced using automated exposure  control, adjustment of the mA and/or kV according to patient size, or  iterative reconstruction technique    TODD CRENSHAW MD   Blood component   Result Value Ref Range    Unit Number [id number]     Blood Component Type Plasma, Thawed     Division Number 00     Status of Unit Ready for patient 04/07/2018 1355     Blood Product Code [id number]     Unit Status NICK    Blood component   Result Value Ref Range    Unit Number [id number]     Blood Component Type Plasma, Thawed     Division Number 00     Status of Unit Ready for patient 04/07/2018 1355     Blood Product Code [id number]     Unit Status NICK

## 2018-04-07 NOTE — TELEPHONE ENCOUNTER
"\"He has been failing for several days.  Last night he fell out of bed, I called 911, they came and assessed him, said his VS were good.  He is responsive but can't sit up or walk.  Just a couple days ago he was walking.  He has been mostly sleeping, has not eaten in 3 days, hardly any fluids, breath shortened but not labored.\"  Spouse reports end stage liver cirrhosis.     Call 911 advised, spouse stated her understanding and plans to do this now.     Reason for Disposition    [1] SEVERE weakness (i.e., unable to walk or barely able to walk, requires support) AND [2] new onset or worsening    Protocols used: NEUROLOGIC DEFICIT-ADULT-AH    "

## 2018-04-08 ENCOUNTER — APPOINTMENT (OUTPATIENT)
Dept: CT IMAGING | Facility: CLINIC | Age: 73
DRG: 025 | End: 2018-04-08
Attending: PSYCHIATRY & NEUROLOGY
Payer: MEDICARE

## 2018-04-08 ENCOUNTER — APPOINTMENT (OUTPATIENT)
Dept: SPEECH THERAPY | Facility: CLINIC | Age: 73
DRG: 025 | End: 2018-04-08
Attending: NEUROLOGICAL SURGERY
Payer: MEDICARE

## 2018-04-08 ENCOUNTER — APPOINTMENT (OUTPATIENT)
Dept: OCCUPATIONAL THERAPY | Facility: CLINIC | Age: 73
DRG: 025 | End: 2018-04-08
Attending: NEUROLOGICAL SURGERY
Payer: MEDICARE

## 2018-04-08 LAB
ANION GAP SERPL CALCULATED.3IONS-SCNC: 9 MMOL/L (ref 3–14)
BUN SERPL-MCNC: 15 MG/DL (ref 7–30)
CALCIUM SERPL-MCNC: 8.6 MG/DL (ref 8.5–10.1)
CHLORIDE SERPL-SCNC: 112 MMOL/L (ref 94–109)
CO2 SERPL-SCNC: 21 MMOL/L (ref 20–32)
CREAT SERPL-MCNC: 0.86 MG/DL (ref 0.66–1.25)
ERYTHROCYTE [DISTWIDTH] IN BLOOD BY AUTOMATED COUNT: 19.2 % (ref 10–15)
GFR SERPL CREATININE-BSD FRML MDRD: 86 ML/MIN/1.7M2
GLUCOSE SERPL-MCNC: 90 MG/DL (ref 70–99)
HCT VFR BLD AUTO: 32.5 % (ref 40–53)
HGB BLD-MCNC: 10.3 G/DL (ref 13.3–17.7)
INR PPP: 1.37 (ref 0.86–1.14)
MAGNESIUM SERPL-MCNC: 1.7 MG/DL (ref 1.6–2.3)
MCH RBC QN AUTO: 25.6 PG (ref 26.5–33)
MCHC RBC AUTO-ENTMCNC: 31.7 G/DL (ref 31.5–36.5)
MCV RBC AUTO: 81 FL (ref 78–100)
PHOSPHATE SERPL-MCNC: 2.3 MG/DL (ref 2.5–4.5)
PLATELET # BLD AUTO: 152 10E9/L (ref 150–450)
POTASSIUM SERPL-SCNC: 4 MMOL/L (ref 3.4–5.3)
RBC # BLD AUTO: 4.02 10E12/L (ref 4.4–5.9)
SODIUM SERPL-SCNC: 142 MMOL/L (ref 133–144)
WBC # BLD AUTO: 8.7 10E9/L (ref 4–11)

## 2018-04-08 PROCEDURE — 99291 CRITICAL CARE FIRST HOUR: CPT | Performed by: INTERNAL MEDICINE

## 2018-04-08 PROCEDURE — 92610 EVALUATE SWALLOWING FUNCTION: CPT | Mod: GN | Performed by: SPEECH-LANGUAGE PATHOLOGIST

## 2018-04-08 PROCEDURE — 40000275 ZZH STATISTIC RCP TIME EA 10 MIN

## 2018-04-08 PROCEDURE — 40000133 ZZH STATISTIC OT WARD VISIT: Performed by: OCCUPATIONAL THERAPIST

## 2018-04-08 PROCEDURE — 25000128 H RX IP 250 OP 636: Performed by: NEUROLOGICAL SURGERY

## 2018-04-08 PROCEDURE — 20000003 ZZH R&B ICU

## 2018-04-08 PROCEDURE — A9270 NON-COVERED ITEM OR SERVICE: HCPCS | Mod: GY | Performed by: NURSE PRACTITIONER

## 2018-04-08 PROCEDURE — 25000125 ZZHC RX 250: Performed by: NEUROLOGICAL SURGERY

## 2018-04-08 PROCEDURE — 99223 1ST HOSP IP/OBS HIGH 75: CPT | Performed by: NURSE PRACTITIONER

## 2018-04-08 PROCEDURE — 25000128 H RX IP 250 OP 636: Performed by: PSYCHIATRY & NEUROLOGY

## 2018-04-08 PROCEDURE — 80048 BASIC METABOLIC PNL TOTAL CA: CPT | Performed by: INTERNAL MEDICINE

## 2018-04-08 PROCEDURE — 97530 THERAPEUTIC ACTIVITIES: CPT | Mod: GO | Performed by: OCCUPATIONAL THERAPIST

## 2018-04-08 PROCEDURE — 70450 CT HEAD/BRAIN W/O DYE: CPT

## 2018-04-08 PROCEDURE — 97166 OT EVAL MOD COMPLEX 45 MIN: CPT | Mod: GO | Performed by: OCCUPATIONAL THERAPIST

## 2018-04-08 PROCEDURE — 40000225 ZZH STATISTIC SLP WARD VISIT: Performed by: SPEECH-LANGUAGE PATHOLOGIST

## 2018-04-08 PROCEDURE — 25000132 ZZH RX MED GY IP 250 OP 250 PS 637: Mod: GY | Performed by: NEUROLOGICAL SURGERY

## 2018-04-08 PROCEDURE — 97535 SELF CARE MNGMENT TRAINING: CPT | Mod: GO | Performed by: OCCUPATIONAL THERAPIST

## 2018-04-08 PROCEDURE — 85027 COMPLETE CBC AUTOMATED: CPT | Performed by: NEUROLOGICAL SURGERY

## 2018-04-08 PROCEDURE — 85027 COMPLETE CBC AUTOMATED: CPT | Performed by: INTERNAL MEDICINE

## 2018-04-08 PROCEDURE — 25000125 ZZHC RX 250: Performed by: INTERNAL MEDICINE

## 2018-04-08 PROCEDURE — 84100 ASSAY OF PHOSPHORUS: CPT | Performed by: INTERNAL MEDICINE

## 2018-04-08 PROCEDURE — 25000128 H RX IP 250 OP 636: Performed by: INTERNAL MEDICINE

## 2018-04-08 PROCEDURE — 25000132 ZZH RX MED GY IP 250 OP 250 PS 637: Mod: GY | Performed by: NURSE PRACTITIONER

## 2018-04-08 PROCEDURE — A9270 NON-COVERED ITEM OR SERVICE: HCPCS | Mod: GY | Performed by: INTERNAL MEDICINE

## 2018-04-08 PROCEDURE — 99207 ZZC CONSULT E&M CHANGED TO INITIAL LEVEL: CPT | Performed by: NURSE PRACTITIONER

## 2018-04-08 PROCEDURE — 25000132 ZZH RX MED GY IP 250 OP 250 PS 637: Mod: GY | Performed by: INTERNAL MEDICINE

## 2018-04-08 PROCEDURE — 85610 PROTHROMBIN TIME: CPT | Performed by: INTERNAL MEDICINE

## 2018-04-08 PROCEDURE — 99291 CRITICAL CARE FIRST HOUR: CPT | Performed by: PSYCHIATRY & NEUROLOGY

## 2018-04-08 PROCEDURE — 92526 ORAL FUNCTION THERAPY: CPT | Mod: GN | Performed by: SPEECH-LANGUAGE PATHOLOGIST

## 2018-04-08 PROCEDURE — A9270 NON-COVERED ITEM OR SERVICE: HCPCS | Mod: GY | Performed by: NEUROLOGICAL SURGERY

## 2018-04-08 PROCEDURE — 83735 ASSAY OF MAGNESIUM: CPT | Performed by: INTERNAL MEDICINE

## 2018-04-08 RX ORDER — CARVEDILOL 3.12 MG/1
3.12 TABLET ORAL 2 TIMES DAILY
Status: DISCONTINUED | OUTPATIENT
Start: 2018-04-08 | End: 2018-04-11

## 2018-04-08 RX ORDER — FUROSEMIDE 10 MG/ML
20 INJECTION INTRAMUSCULAR; INTRAVENOUS ONCE
Status: COMPLETED | OUTPATIENT
Start: 2018-04-08 | End: 2018-04-08

## 2018-04-08 RX ORDER — TRAMADOL HYDROCHLORIDE 50 MG/1
50 TABLET ORAL EVERY 6 HOURS PRN
Status: DISCONTINUED | OUTPATIENT
Start: 2018-04-08 | End: 2018-04-08

## 2018-04-08 RX ORDER — OXYCODONE HYDROCHLORIDE 5 MG/1
5 TABLET ORAL
Status: DISCONTINUED | OUTPATIENT
Start: 2018-04-08 | End: 2018-04-12 | Stop reason: HOSPADM

## 2018-04-08 RX ORDER — LEVETIRACETAM 5 MG/ML
500 INJECTION INTRAVASCULAR EVERY 12 HOURS
Status: DISCONTINUED | OUTPATIENT
Start: 2018-04-08 | End: 2018-04-10 | Stop reason: ALTCHOICE

## 2018-04-08 RX ADMIN — SODIUM CHLORIDE 5 MG/HR: 9 INJECTION, SOLUTION INTRAVENOUS at 08:37

## 2018-04-08 RX ADMIN — CEFAZOLIN SODIUM 2 G: 2 INJECTION, SOLUTION INTRAVENOUS at 23:08

## 2018-04-08 RX ADMIN — CEFAZOLIN SODIUM 2 G: 2 INJECTION, SOLUTION INTRAVENOUS at 15:57

## 2018-04-08 RX ADMIN — FUROSEMIDE 20 MG: 10 INJECTION, SOLUTION INTRAMUSCULAR; INTRAVENOUS at 13:35

## 2018-04-08 RX ADMIN — FAMOTIDINE 20 MG: 10 INJECTION, SOLUTION INTRAVENOUS at 06:54

## 2018-04-08 RX ADMIN — HYDROCODONE BITARTRATE AND ACETAMINOPHEN 1 TABLET: 5; 325 TABLET ORAL at 11:43

## 2018-04-08 RX ADMIN — CARVEDILOL 3.12 MG: 3.12 TABLET, FILM COATED ORAL at 20:01

## 2018-04-08 RX ADMIN — POTASSIUM CHLORIDE AND SODIUM CHLORIDE: 900; 150 INJECTION, SOLUTION INTRAVENOUS at 07:57

## 2018-04-08 RX ADMIN — LEVETIRACETAM 500 MG: 5 INJECTION INTRAVENOUS at 11:36

## 2018-04-08 RX ADMIN — FENTANYL CITRATE 25 MCG: 50 INJECTION INTRAMUSCULAR; INTRAVENOUS at 11:08

## 2018-04-08 RX ADMIN — OXYCODONE HYDROCHLORIDE 5 MG: 5 TABLET ORAL at 17:20

## 2018-04-08 RX ADMIN — LEVETIRACETAM 500 MG: 5 INJECTION INTRAVENOUS at 21:24

## 2018-04-08 RX ADMIN — THIAMINE HYDROCHLORIDE 500 MG: 100 INJECTION, SOLUTION INTRAMUSCULAR; INTRAVENOUS at 23:30

## 2018-04-08 RX ADMIN — CEFAZOLIN SODIUM 2 G: 2 INJECTION, SOLUTION INTRAVENOUS at 06:54

## 2018-04-08 ASSESSMENT — ACTIVITIES OF DAILY LIVING (ADL)
PREVIOUS_RESPONSIBILITIES: MEDICATION MANAGEMENT;FINANCES;DRIVING
WHICH_OF_THE_ABOVE_FUNCTIONAL_RISKS_HAD_A_RECENT_ONSET_OR_CHANGE?: AMBULATION

## 2018-04-08 ASSESSMENT — PAIN DESCRIPTION - DESCRIPTORS
DESCRIPTORS: ACHING;DISCOMFORT;CONSTANT
DESCRIPTORS: ACHING

## 2018-04-08 NOTE — PROGRESS NOTES
04/08/18 1436   Quick Adds   Type of Visit Initial Occupational Therapy Evaluation   Living Environment   Lives With spouse   Living Arrangements apartment   Home Accessibility (elevator)   Transportation Available car;family or friend will provide   Self-Care   Dominant Hand ambidextrous   Usual Activity Tolerance good   Current Activity Tolerance fair   Equipment Currently Used at Home grab bar;cane, straight;walker, rolling   Activity/Exercise/Self-Care Comment enjoys golf   Functional Level Prior   Ambulation 1-->assistive equipment   Transferring 0-->independent   Toileting 1-->assistive equipment   Bathing 1-->assistive equipment   Dressing 0-->independent   Eating 0-->independent   Communication 0-->understands/communicates without difficulty   Swallowing 0-->swallows foods/liquids without difficulty   Cognition 0 - no cognition issues reported   Fall history within last six months yes   Number of times patient has fallen within last six months 2   Prior Functional Level Comment Pts wife assisted with PLOF at times. Before intialy fall pt was using SEC occasinally after fall and 2nd fall getting weaker and using ww most of the time. PT reports was driving, managging own meds prior to first fall.    General Information   Onset of Illness/Injury or Date of Surgery - Date 04/07/18   Referring Physician Jono Issa MD   Patient/Family Goals Statement none stated   Additional Occupational Profile Info/Pertinent History of Current Problem Antonio Ramirez is a 73 year old male who was admitted on 4/7/2018. He presented to ER with mental status changes. He was found to have a large right SDH with midline shift. He was taken to OR by Dr. Jono Issa.  He underwent right frontotemporal parietal craniotomy for evacuation of SDH on 4-7-2018. PMH:CHEMA OLIVERERNIE has prosthesis   Precautions/Limitations fall precautions   Cognitive Status Examination   Orientation orientation to person, place and time   Level of  Consciousness alert   Able to Follow Commands WNL/WFL   Personal Safety (Cognitive) moderate impairment   Cognitive Comment will contineu to monitor cognition/safety, pt slow to process and had difficulty explaining situation needing cues    Visual Perception   Visual Perception Wears glasses  (reading)   Sensory Examination   Sensory Comments neruopathy in feet and hands   Pain Assessment   Patient Currently in Pain Yes, see Vital Sign flowsheet  (3/10 headache incision pain. )   Range of Motion (ROM)   ROM Comment B UE AROM WFL    Strength   Strength Comments R UE MMT 4/5, L UE MMT 4-/5   Hand Strength   Hand Strength Comments R  WFL, L  fair   Coordination   Upper Extremity Coordination (will cont to monitor/assess, appears symmterical)   Bed Mobility Skill: Rolling/Turning   Level of [place] - Bed Mobility Skill Rolling Turning minimum assist (75% patients effort)   Assistive Device:  Rolling/Turning bed rails   Bed Mobility Skill: Scooting/Bridging   Level of [place]: Scooting/Bridging contact guard   Bed Mobility Skill: Sit to Supine   Level of [place]: Sit/Supine minimum assist (75% patients effort)   Physical Assist/Nonphysical Assist: Sit/Supine 2 persons   Bed Mobility Skill: Supine to Sit   Level of [place]: Supine/Sit minimum assist (75% patients effort)   Physical Assist/Nonphysical Assist: Supine/Sit 2 persons   Transfer Skill: Bed to Chair/Chair to Bed   Level of [place]: Bed to Chair minimum assist (75% patients effort)   Physical Assist/Nonphysical Assist: Bed to Chair 2 persons   Assistive Device - Transfer Skill Bed to Chair Chair to Bed Rehab Eval standard walker   Transfer Skill: Sit to Stand   Level of [place]: Sit/Stand minimum assist (75% patients effort)   Physical Assist/Nonphysical Assist: Sit/Stand 2 persons   Assistive Device for Transfer: Sit/Stand standard walker   Lower Body Dressing   Level of [place]: Dress Lower Body moderate assist (50%  "patients effort)   Instrumental Activities of Daily Living (IADL)   Previous Responsibilities medication management;finances;driving   Activities of Daily Living Analysis   Impairments Contributing to Impaired Activities of Daily Living balance impaired;cognition impaired;strength decreased;pain   General Therapy Interventions   Planned Therapy Interventions ADL retraining;cognition;neuromuscular re-education;transfer training   Clinical Impression   Criteria for Skilled Therapeutic Interventions Met yes, treatment indicated   OT Diagnosis decreased ADL/IADL's and functional mobility   Influenced by the following impairments impaired balance, L sided strength slightly decreased vs R, activity tolerance, safety and cognition.    Assessment of Occupational Performance 3-5 Performance Deficits   Identified Performance Deficits impaired I with dressing, toielt and shower transfer, med mgmt and driving   Clinical Decision Making (Complexity) Moderate complexity   Therapy Frequency daily   Predicted Duration of Therapy Intervention (days/wks) 7 days   Anticipated Discharge Disposition Acute Rehabilitation Facility   Risks and Benefits of Treatment have been explained. Yes   Patient, Family & other staff in agreement with plan of care Yes   [facility] AM-PAC  \"6 Clicks\" Daily Activity Inpatient Short Form   1. Putting on and taking off regular lower body clothing? 3 - A Little   2. Bathing (including washing, rinsing, drying)? 2 - A Lot   3. Toileting, which includes using toilet, bedpan or urinal? 3 - A Little   4. Putting on and taking off regular upper body clothing? 3 - A Little   5. Taking care of personal grooming such as brushing teeth? 3 - A Little   6. Eating meals? 3 - A Little   Daily Activity Raw Score (Score out of 24.Lower scores equate to lower levels of function) 17   Total Evaluation Time   Total Evaluation Time (Minutes) 10     "

## 2018-04-08 NOTE — PROGRESS NOTES
[facility]    Neurosurgery Progress Note    Date of Service (when I saw the patient): 04/08/2018     Assessment & Plan   Antonio Ramirez is a 73 year old male who was admitted on 4/7/2018. He presented to ER with mental status changes. He was found to have a large right SDH with midline shift. He was taken to OR by Dr. Jono Issa.  He underwent right frontotemporal parietal craniotomy for evacuation of SDH on 4-7-2018.  Today pt is lying in bed. He is alert and oriented and able to follow all commands. He notes some head pain at the incision site.  Incision with dressing intact and no excessive drainage.  Pt stable to transfer to [facility]. We will continue to monitor.  CT this AM shows SDH evacuation and improvement of midline shift.      Active Problems:    S/P craniotomy    Assessment: post crani    Plan: staple removal on 4-     Continue to monitor      Subdural hematoma (H)    Assessment: post crani    Plan: as above       I have discussed the following assessment and plan Dr. Jono Issa  who is in agreement with initial plan and will follow up with further consultation recommendations.    Juliette Silva [facility]  [facility]  [place] [address]    Tel [phone]  Pager [phone]      Interval History   Stable with headache    Physical Exam   Temp: 97.8  F (36.6  C) Temp src: Oral BP: (!) 162/101 Pulse: 58 Heart Rate: 75 Resp: 20 SpO2: 97 % O2 Device: Oxymask Oxygen Delivery: 5 LPM  Vitals:    04/07/18 1730   Weight: 189 lb 13.1 oz (86.1 kg)     Vital Signs with Ranges  Temp:  [97  F (36.1  C)-99.8  F (37.7  C)] 97.8  F (36.6  C)  Pulse:  [51-58] 58  Heart Rate:  [46-80] 75  Resp:  [11-25] 20  BP: (159-188)/() 162/101  MAP:  [66 mmHg-105 mmHg] 84 mmHg  Arterial Line BP: (103-173)/(46-71) 157/54  FiO2 (%):  [40 %] 40 %  SpO2:  [91 %-100 %] 97 %  I/O last 3 completed shifts:  In: 2040.79  [I.V.:2040.79]  Out: 635 [Urine:455; Drains:180]    Heart Rate: 75, Blood pressure (!) 162/101, pulse 58, temperature 97.8  F (36.6  C), temperature source Oral, resp. rate 20, weight 189 lb 13.1 oz (86.1 kg), SpO2 97 %.  189 lbs 13.06 oz  HEENT:  Normocephalic, atraumatic.  PERRLA.  EOM s intact.    Neck:  Supple, non-tender, without lymphadenopathy.  Heart:  No peripheral edema  Lungs:  No SOB  Abdomen:  Soft, non-tender, non-distended.   Skin:  Warm and dry, good capillary refill. Cranial staples with dressing intact   Extremities:  Good radial and dorsalis pedis pulses bilaterally, no edema, cyanosis or clubbing.    NEUROLOGICAL EXAMINATION:     Mental status:  Alert and Oriented x 3, speech is fluent.  Cranial nerves:  II-XII intact.   Motor:  Strength is 5/5 throughout the upper and lower extremities  Shoulder Abduction:  Right:  5/5   Left:  5/5  Biceps:                      Right:  5/5   Left:  5/5  Triceps:                     Right:  5/5   Left:  5/5  Wrist Extensors:       Right:  5/5   Left:  5/5  Wrist Flexors:           Right:  5/5   Left:  5/5  interosseus :            Right:  5/5   Left:  5/5   Hip Flexor:                Right: 5/5  Left 5/5  Hip Adductor:             Right:  5/5    Hip Abductor:             Right:  5/5    Gastroc Soleus:        Right:  5/5    Tib/Ant:                      Right:  5/5   EHL:                     Right:  5/5     Pt has left BKA, skin intact     Sensation:  intact    Coordination:  Smooth finger to nose testing.   Negative pronator drift.    Gait: deferred       Medications     - MEDICATION INSTRUCTIONS -       0.9% sodium chloride + KCl 20 mEq/L 75 mL/hr at 04/08/18 0751     niCARdipine 40 mg in 200 mL 0.9% NaCl 5 mg/hr (04/08/18 0837)       levETIRAcetam  500 mg Intravenous Q12H     ceFAZolin  2 g Intravenous Q8H     chlorhexidine  15 mL Mouth/Throat Q12H     docusate sodium  100 mg Oral BID       Data     All new lab and imaging data was personally reviewed by  me.  CT:IMPRESSION: Evolving postoperative changes after evacuation of right  lateral subdural hematoma with decreased mass effect.    CBC RESULTS:   Recent Labs   Lab Test  04/08/18   0547   WBC  8.7   RBC  4.02*   HGB  10.3*   HCT  32.5*   MCV  81   MCH  25.6*   MCHC  31.7   RDW  19.2*   PLT  152     Basic Metabolic Panel:  Lab Results   Component Value Date     04/08/2018      Lab Results   Component Value Date    POTASSIUM 4.0 04/08/2018     Lab Results   Component Value Date    CHLORIDE 112 04/08/2018     Lab Results   Component Value Date    BENNIE 8.6 04/08/2018     Lab Results   Component Value Date    CO2 21 04/08/2018     Lab Results   Component Value Date    BUN 15 04/08/2018     Lab Results   Component Value Date    CR 0.86 04/08/2018     Lab Results   Component Value Date    GLC 90 04/08/2018     INR:  Lab Results   Component Value Date    INR 1.37 04/08/2018    INR 1.96 04/07/2018    INR 3.61 01/28/2018    INR 2.67 12/15/2017    INR 1.39 12/10/2017    INR 1.16 12/09/2017    INR 1.25 12/08/2017    INR 1.65 12/04/2017    INR 1.65 12/01/2017    INR 1.91 12/01/2017    INR 2.56 11/30/2017    INR 2.53 11/29/2017

## 2018-04-08 NOTE — PROGRESS NOTES
Pt extubated to 40% cool aerosol at 0:03 per MD order. No stridor noted, SpO2 100%.     Will continue to follow.     Richard Esteves RT

## 2018-04-08 NOTE — PLAN OF CARE
Problem: Patient Care Overview  Goal: Plan of Care/Patient Progress Review  Discharge Planner OT   Patient plan for discharge: none stated.  Current status: Eval completed and treatment initiated. Pt lives in an apt with his wife that has an elevator. Pt and wife reports I with ADL/IADL's and functional mobility with SEC occasionally pt has h/o L BKA and has a prosthesis, pt s/p falls and sustained SDH and now s/p crani. Supine <> sit with MIN A x 1-2, pt requires CGA/min A for dynamic balance donning prosthesis with SANDEEP seated at EOB due to L lateral and posterior lean. Pt sit <> stand and side stepped toward HOB and took 2 steps forward and back with ww and CGA/MIN A x 1-2 for safety. Pt alert and oriented to self, place, year and month, but had difficulty at first verbalizing situation why in hospital. Will continue to monitor safety and cognition.   Barriers to return to prior living situation: decreased balance, activity tolerance and safety, slow to respond at times, will continue to monitor.   Recommendations for discharge: ARU  Rationale for recommendations: daily intensive therapy to increase ADL and functional mobility I and safety to PLOF.       Entered by: Mary Hodgson 04/08/2018 4:03 PM

## 2018-04-08 NOTE — PLAN OF CARE
Problem: Patient Care Overview  Goal: Plan of Care/Patient Progress Review  Discharge Planner SLP   Patient plan for discharge: Return home; unable to provide details  Current status: SLP: RN reported that pt passed RN screen, however, coughed with water following. Neurologist also reported  coughing with secretions today and concern for withdrawal. Pt alert and following commands with encouragement. Oral motor exam WFL with adequate strength and ROM. ? cognition/sarcasm as pt inconsistently followed cues. Thin water trialed with immediate throat clear on 5/7 trials despite cues for chin tuck. Improved performance when pt used effortful swallow. Adequate oral phase with delayed throat clear on puree and semi solid textures. Improved timing and no overt s/sx of aspiration with honey thick liquids via teaspoon. Due to MD report of concern for withdrawal, inconsistent use of safe swallow strategies/cognition and reduced airway protection with weak voice, recommend: full liquid, honey thick liquid diet by teaspoon. Pt upright for all intake with 1:1 assist for small sips by teaspoon.   Barriers to return to prior living situation: Cognition; dysphagia; possible withdrawal  Recommendations for discharge: TCU pending progress/PT/OT evaluations  Rationale for recommendations: ST daily to advance to baseline function       Entered by: Sasha Wiley 04/08/2018 1:16 PM

## 2018-04-08 NOTE — PROGRESS NOTES
Hospitalist service requested to perform admitting H&P in ED, but patient taken to OR prior to being seen by service. Now out of OR s/p craniotomy for right subdural hematoma and intubated in ICU.    Hospitalist service will sign off at this time, please consult when extubated and ready for transfer to medical floor. Discussed with nursing staff. Thank you.    JoAnna Barthell, PA-C  Pager: [phone]  4/7/2018   7:51 PM

## 2018-04-08 NOTE — PROGRESS NOTES
"[facility] Intensive Care Progress Note    Date of Service (when I saw the patient): 04/08/2018     Assessment & Plan   Antonio Ramirez is a 73 year old male who was admitted on 4/7/2018 with a right subdural hematoma.    Neurology:  Right Subdural hematoma: sustained after fall. S/p OR for craniotomy. SEBASTIAN Drain in place. Neuro ICU consult. Head CT showed improvement in shift this am but still some midline shift present.  Alcohol abuse: drinking \"less\" per family report. Has remote history of withdrawal per daughter, but none recent. Will monitor for signs/sympotms of withdrawal.   Peripheral polyneuropathy: nerve root impingement as potential etiology.   - monitor for signs/symtpoms withdrawal  - 100% O2 for 2 hours at a time to help with resorption of pneumocephalus post surgery.     Cardiovascular:  Paroxysmal Afib: slow ventricular response; suspect relate to intracranial shift; tx with atropine for rate 30s x1, stable overnight..  HFrEF: 1/2018 40-45%, mild reduced RV systolic function, NT pro BNP 4334. Doesn't appear to be decompensated at this time, but will need to maintain euvolemia. Takes torsemide at home, resume diuretics today.  Aortic stenosis and mitral regurg: not an acute issue  CAD: prior CAB.  Hyperlipidemia: previous statin intolerance complicated by rhabdomyolysis and renal failure (including a period of time requiring HD). Not an acute issue.  Blood pressure management: maintain Systolic less than 140. Nicardipine started overnight.  -Tele to monitor heart rate.   -hold home carvedilol  -nicardipine gtt for now, will need to transition to PO   -resume diuretics     Pulmonary:        Postoperative ventilator management: exutbated overnight.     Ventilation Mode: CPAP/PS  (Continuous positive airway pressure with Pressure Support)  FiO2 (%): 40 %  Rate Set (breaths/minute): 14 breaths/min  Tidal Volume Set (mL): 530 mL  PEEP (cm H2O): 5 cmH2O  Pressure Support " (cm H2O): 5 cmH2O  Oxygen Concentration (%): 40 %  Resp: 20    Renal  CKD: baseline CKD but creatinine good on admission. Does have a previous episode of ANSELMO resulting in HD which resolved (2010). Repeat BMP in am.  Fluid management: net positive yesterday. Will resume low dose furosemide IV today, goal -500mL.  -IV furosemide 20mg x1     ID:         Periop antibiotics: cefazolin given presence of drain.  Concern for UTI: UA + for nitrite and LE, concerning for infection. Cefazolin will cover urinary pathogens. -Urine culture pending (prelim with non-lactose fermenting GNR).  -cefazolin     GI  Cirrhosis with ascites, etoh: known history, with prior paracenteses. Liver enzymes normal except for mild elevation of Tbili. Monitor.  GERD: famotidine for GI PPx while intubated.  -     Nutrition  ADAT:   -     Endocrine:  Risk for stress hyperglycemia: SSI if needed  -     Heme/Onc:  Antiphospholipid antibody syndrome, IVC filter in place, chronic warfarin: received reversal agents including Prothrombin complex concentrates and vitamin K. Was 1.96 for INR on admission, 1.37 today. Will need to carefully consider anticoagulation plan moving forward given competing risk factors of thrombosis and recurrent falls.  Diffuse large B Cell Lymphoma: not an active issue  Prostate cancer: not an active issue  -     MSK:  Left BKA: with previous stump abscess. No issues currently.    DVT Prophylaxis: Pneumatic Compression Devices  GI Prophylaxis: H2 Blocker, d/c now that he is extubated (not on acid blocker at home).    Restraints: Restraints for medical healing needed: NO    Family update by me today: not yet    Nidia Coon    Time Spent on this Encounter   Billing:  I spent 40 minutes bedside and on the inpatient unit today managing the critical care of Antonio Ramirez in relation to the issues listed in this note.    Main Plans for Today   Wean nicardipine, transition to PO agents if bp remains above 140  1 dose  IV furosemide, goal negative 500mL.    Interval History   Extubated at midnight  CT head shows postoperative changes, improvement in midline shift.    Physical Exam   Temp: 99.8  F (37.7  C) Temp src: Axillary Temp  Min: 97  F (36.1  C)  Max: 99.8  F (37.7  C) BP: (!) 162/101 Pulse: 58 Heart Rate: 75 Resp: 20 SpO2: 96 % O2 Device: Mechanical Ventilator    Vitals:    04/07/18 1730   Weight: 86.1 kg (189 lb 13.1 oz)     I/O last 3 completed shifts:  In: 2040.79 [I.V.:2040.79]  Out: 635 [Urine:455; Drains:180]    Neurologic: interactive- but touches bandages on head, moves face mask; redirectible.  Cardiovascular: irreg, normal rate, no MRG.  Respiratory: good air movement bilaterally  GI: soft, non-tender, non-distended, BS+  Skin/Extremities: LLE BKA, no rashes, no ecchymoses  Lines: No erythema or discharge at entry site for Arterial Line  Current lines are required for patient management    Medications     - MEDICATION INSTRUCTIONS -       0.9% sodium chloride + KCl 20 mEq/L 75 mL/hr at 04/07/18 1900     niCARdipine 40 mg in 200 mL 0.9% NaCl 5 mg/hr (04/08/18 0731)       ceFAZolin  2 g Intravenous Q8H     chlorhexidine  15 mL Mouth/Throat Q12H     famotidine  20 mg Intravenous Q12H     docusate sodium  100 mg Oral BID       Data     Recent Labs  Lab 04/08/18  0547 04/07/18  1240   WBC 8.7 5.2   HGB 10.3* 11.3*   MCV 81 81    150   INR 1.37* 1.96*    141   POTASSIUM 4.0 4.0   CHLORIDE 112* 109   CO2 21 24   BUN 15 17   CR 0.86 0.86   ANIONGAP 9 8   BENNIE 8.6 9.3   GLC 90 87   ALBUMIN  --  3.5   PROTTOTAL  --  7.7   BILITOTAL  --  2.2*   ALKPHOS  --  93   ALT  --  17   AST  --  30   TROPI  --  <0.015     Recent Results (from the past 24 hour(s))   CT Head w/o Contrast   Result Value    Radiologist flags Intracranial hemorrhage (AA)    Narrative    CT OF THE HEAD WITHOUT CONTRAST 4/7/2018 1:21 PM     COMPARISON: Head CT 1/28/2018.    HISTORY: Fall, on coumadin.    TECHNIQUE: 5 mm thick axial CT images of  the head were acquired  without IV contrast material.      Impression    IMPRESSION: There is a new mixed density subdural collection along the  right cerebral convexity measuring 2.7 cm in maximum thickness  resulting in at least 1.9 cm of leftward midline shift of the septum  pellucidum, subfalcine herniation of the right frontal lobe towards  the left, and probable transtentorial herniation bilaterally. This  most likely represents a mixed age subdural hematoma.    The ventricles and basal cisterns are within normal limits in  configuration given the degree of cerebral volume loss. No mass or  recent infarct. There is mild diffuse cerebral volume loss.    The visualized paranasal sinuses are well-aerated. There is no  mastoiditis. There are no fractures of the visualized bones.    IMPRESSION:   1. New large subdural collection along the right cerebral convexity  resulting in severe left or midline shift of the septum pellucidum,  subfalcine herniation of the right frontal lobe towards the left, and  probable bilateral transtentorial herniation.   2. Diffuse cerebral volume loss and cerebral white matter changes  consistent with chronic small vessel ischemic disease.      [Critical Result: Intracranial hemorrhage]    Finding was identified on 4/7/2018 1:26 PM.     HARESH SHANIQUE SORIA was contacted by Dr. Crenshaw on 4/7/2018 1:26 PM  and verbalized understanding of the critical result.     Radiation dose for this scan was reduced using automated exposure  control, adjustment of the mA and/or kV according to patient size, or  iterative reconstruction technique    TODD CRENSHAW MD   XR Chest Port 1 View    Narrative    CHEST ONE VIEW PORTABLE   4/7/2018 5:45 PM     HISTORY: Endotracheal tube positioning.     COMPARISON: None.      Impression    IMPRESSION: An endotracheal tube is in place, with tip approximately 5  cm above the akin. Opacity at the left lung base may represent  atelectasis or infection. Prior  midline sternotomy. Aortic  calcification.    KACI BAUMANN MD   CT Head w/o Contrast    Narrative    CT SCAN OF THE HEAD WITHOUT CONTRAST   4/7/2018 9:06 PM     HISTORY: Status post craniotomy for subdural hematoma.    TECHNIQUE:  Axial images of the head and coronal reformations without  IV contrast material. Radiation dose for this scan was reduced using  automated exposure control, adjustment of the mA and/or kV according  to patient size, or iterative reconstruction technique.    COMPARISON: 4/7/2018 at 1315 hours.    FINDINGS: Right lateral subdural fluid collection has been evacuated  via a right parietal craniotomy. Air is seen in the subdural space  along with the drain and skin staples are seen over the craniotomy. A  small amount of layering blood remains in the posterior aspect of the  subdural space. There is persistent mass effect with persistent shift  of midline structures to the left by 1.5 cm, although this is less  than on the previous exam, and left lateral ventricle no longer  appears as trapped as it did earlier.    There is a small subdural fluid collection now present anterior to the  left frontal lobe inferiorly about 0.7 cm thick. No intraparenchymal  or intraventricular bleeding is seen.      Impression    IMPRESSION:  1. Status post evacuation of right lateral subdural fluid collection  with expected postoperative changes.  2. Decrease in midline shift and decrease in trapping of left lateral  ventricle.  3. Small new subdural fluid collection anterior to left frontal lobe.  Continued surveillance is warranted.    FEDERICO LI MD

## 2018-04-08 NOTE — CONSULTS
[facility]    Hospitalist Consultation    Date of Admission:  4/7/2018    Assessment & Plan   Antonio Raimrez is a 73 year old male who was admitted on 4/7/2018 with a subdural hematoma from a fall.      Right subdural hematoma 2/2 fall, on chronic anticoagulation s/p R frontoparietal craniotomy for evacuation of subdural hematoma, POD#1.  - Neurosurgery and neurocritical care following  - Seizure prophylaxis, keppra 500 mg BID IV  - Will defer drain and wound cares to neurosurgery; staple removal 4/21/2018  - On cefazolin while drain remains in place (and for suspected UTI, see below)  - POD#1 head CT noted improvement in midline shift, however, shift remains  - PT/OT/ST consulted, appreciate recommendations  - Roxicodone PRN for pain management    Antiphospholipid antibody syndrome, IVC filter in place, on chronic anticoagulation.  History of pulmonary embolism, deep vein thrombosis.  - INR on admission 1.96, repeat 1.37  - Holding PTA warfarin and ASA; of note, pharmacy to adjust warfarin dose per chromogenic factor 10 when appropriate to resume   - Received reversal agents on admission including prothrombin complex concentrates and vitamin K.  - SBP goal < 140, recently transitioned off nicardipine.  - Will defer anticoagulation to neurosurgery    Paroxysmal atrial fibrillation on chronic anticoagulation with noted slowed ventricular response.  - Pt received atropine post operatively for bradycardia HR 30s  - Holding PTA warfarin  - Continuous telemetry ordered    Chronic anemia.  - Hemoglobin on admission 11.3, today 10.3; baseline range 8.0-11  - Will repeat CBC in am    History of alcohol use disorder.  Alcoholic cirrhosis with ascites, portal hypertension.  - Monitor for withdrawal symptoms.  - LFTs WNL except for mildly elevated total bili 2.2 on admission, will continue to monitor    HFrEF, EF 40-45%.  Cardiomyopathy.  Mild valvular aortic stenosis, moderate mitral regurgitation, mild to  moderate tricuspid regurgitation, mild ascending aorta dilatation.  Coronary artery disease s/p 3-v CABG.  Thoracic aortic aneurysm without rupture.  - BNP on admission 4334  - Continue PTA BB  - Holding PTA ASA, warfarin  - Not on statin PTA due to intolerance  - Received lasix 20 mg IV today, holding PTA torsemide at this time    Hyperlipidemia.  - Does not tolerate statin therapy, not on PTA medication for above diagnosis; will continue to monitor at this time.    Essential hypertension.  - Current SBP goal <140, recently transitioned off nicardipine infusion  - Continue PTA coreg   - Holding PTA torsemide at this time    Chronic kidney disease, stage 3.  - History of ANSELMO requiring HD in 2010  - Creatinine on admission 0.86, today 0.86  - Will repeat BMP in am  - Avoid nephrotoxic agents as deemed appropriate    Suspected urinary tract infection.  - U/A noted positive nitrites, large leukocyte esterase and >182 WBC  - U/C pending, will continue to follow for sensitivities  - Continue cefazolin at this time    Gastroesophageal reflux disease.  - Not on any PTA medications for above diagnosis, will continue to monitor at this time.    Low grade B cell lymphoproliferative disorder.  History of prostate cancer s/p radiative seed implants.  - No acute issues, will continue to monitor.    Peripheral polyneuropathy.  - Holding PTA gabapentin at this time    DVT Prophylaxis: Pneumatic Compression Devices  Code Status: Full Code    Disposition: Expected discharge in several days.    DIPAK Sales CNP    Reason for Consult   Reason for consult: Transfer of care and transition to floor from ICU    Primary Care Physician   Main Hardy    Chief Complaint   Headache, take out my catheter    History is obtained from the patient and electronic health record    History of Present Illness   Mr. Antonio Ramirez is a 73 year old male who was admitted on 4/7/2018 with a subdural hematoma from a fall.  Patient had fallen out  "of bed on 4/7/2018 at 0445.  EMS as called at that time in which pt refused transport at that time.  Due to increased lethargy and altered mental status, pt's wife called EMS and transferred pt in [facility].  Pt expressed he had a headache; however, responses were delayed.  Imaging was completed noting a new large right subdural hematoma resulting in midline shift and probable bilateral transtentorial herniation.  He was in slow ventricular response atrial fibrillation on arrival.  Alcohol was negative on arrival.  Pt transferred to ICU post emergent surgical intervention.  Pt was extubated 4/8/2018 at 0003.      Patient seen on in the ICU.  Pt reports he does not recall the fall yesterday morning.  Pt expresses \"My wife thinks I drink more than I do.\"  No current or recent CP, SOB, N/V, diarrhea, constipation, F/C.           Past Medical History    I have reviewed this patient's medical history and updated it with pertinent information if needed.   Past Medical History:   Diagnosis Date     Alcoholism (H)      Amputated left leg (H)      Anemia      Anticardiolipin antibody syndrome (H)      Antiplatelet or antithrombotic long-term use      Aortic stenosis      Arthritis      Balance problem      Cardiomyopathy (H)      Coagulation disorder (H)     cardiolipinantibody syndrome     Coronary artery disease      Gastro-oesophageal reflux disease      Heart attack 2007     Hiatal hernia      Hypercholesterolemia      Hypertension      Left foot drop      Liver disease     alcoholic liver disease, alcoholic cirrohsosis, portal hypertension     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)     ?When diagnosed, patient not aware of this     Moderate mitral regurgitation      Monoclonal gammopathy      Neuropathy      Noninfectious ileitis     diverticulitis of colon     PE (pulmonary embolism) 2006     Prostate cancer (H) 2000    Treated with radiation (seed implants in 2000) -- no problems since     Renal disease     " kidney stones     Syncope      Thoracic aortic aneurysm, without rupture      Thrombocytopenia (H)      Thrombosis of leg      Urethral stricture      Past Surgical History   I have reviewed this patient's surgical history and updated it with pertinent information if needed.  Past Surgical History:   Procedure Laterality Date     AMPUTATE LEG BELOW KNEE Left 04/2014    related to gangrene, started as foot ulcer related to neuropathy     AMPUTATE LEG BELOW KNEE Left 12/4/2017    Procedure: AMPUTATE LEG BELOW KNEE;  Exploration of Left Leg Below Knee Amputation Stump with Ligation of Bleeders.;  Surgeon: Leandro Arreola MD;  Location:  OR     APPENDECTOMY       APPLY WOUND VAC Left 12/6/2017    Procedure: APPLY WOUND VAC;;  Surgeon: Saima Howard MD;  Location:  SD     ARTHROPLASTY KNEE Right 9/19/2014    Procedure: ARTHROPLASTY KNEE;  Surgeon: Saima Howard MD;  Location:  OR     CARDIAC SURGERY      CABG     CHOLECYSTECTOMY       COLONOSCOPY       CYSTOSCOPY, DILATE URETHRA, COMBINED N/A 10/12/2016    Procedure: COMBINED CYSTOSCOPY, DILATE URETHRA;  Surgeon: Dimitry Bello MD;  Location:  OR     ENDOSCOPIC STRIPPING VEIN(S)       esophagogastroduodenoscopy       EYE SURGERY      cataracts     GENITOURINARY SURGERY      Prostate Seen Implants     HERNIA REPAIR      Umbilical     INCISION AND DRAINAGE LOWER EXTREMITY, COMBINED Left 12/1/2017    Procedure: COMBINED INCISION AND DRAINAGE LOWER EXTREMITY;  INCISION AND DRAINAGE OF LEFT BELOW KNEE AMPUTATION ABSCESS, WOUND VAC PLACEMENT;  Surgeon: Saima Howard MD;  Location:  OR     IR IVC FILTER PLACEMENT       IRRIGATION AND DEBRIDEMENT LOWER EXTREMITY, COMBINED Left 12/6/2017    Procedure: COMBINED IRRIGATION AND DEBRIDEMENT LOWER EXTREMITY;  IRRIGATION AND DEBRIDEMENT OF LEFT BELOW KNEE AMPUTATION SITE WITH WOUND VAC REPLACEMENT;  Surgeon: Saima Howard MD;  Location: [facility]     IRRIGATION AND DEBRIDEMENT LOWER EXTREMITY, COMBINED  "Left 12/8/2017    Procedure: COMBINED IRRIGATION AND DEBRIDEMENT LOWER EXTREMITY;  IRRIGATION AND DEBRIDEMENT OF LEFT BELOW THE KNEE AMPUTATION AND SHORTENING OF THE TIBIA WITH WOUND CLOSURE;  Surgeon: Saima Howard MD;  Location:  OR     ORTHOPEDIC SURGERY      (R) knee scope     ORTHOPEDIC SURGERY      total hip      ORTHOPEDIC SURGERY      elbow surgery     Prior to Admission Medications   Prior to Admission Medications   Prescriptions Last Dose Informant Patient Reported? Taking?   ASPIRIN EC PO  Self Yes Yes   Sig: Take 81 mg by mouth daily   carvedilol (COREG) 3.125 MG tablet   No Yes   Sig: Take 1 tablet (3.125 mg) by mouth 2 times daily (with meals)   gabapentin (NEURONTIN) 600 MG tablet   No Yes   Sig: Take 1 tablet (600 mg) by mouth 2 times daily   potassium chloride SA (K-DUR/KLOR-CON M) 10 MEQ CR tablet  Self No Yes   Sig: Take 1 tablet (10 mEq) by mouth daily   torsemide (DEMADEX) 20 MG tablet   Yes Yes   Sig: Take 20 mg by mouth daily   traMADol (ULTRAM) 50 MG tablet   Yes Yes   Sig: Take 50 mg by mouth every 12 hours as needed   warfarin (COUMADIN) 5 MG tablet   No Yes   Sig: TAKE ONE TABLET BY MOUTH EVERY DAY EXCEPT SUNDAY      Facility-Administered Medications: None     Allergies   Allergies   Allergen Reactions     Hmg-Coa-R Inhibitors Other (See Comments)     Rhabdomyolysis occurred within a couple days     Ciprofloxacin Itching     Severe itching \"like ants were crawling\"     Social History   I have reviewed this patient's social history and updated it with pertinent information if needed. Antonio Ramirez  reports that he has never smoked. He has never used smokeless tobacco. He reports that he drinks 1.75 L vodka over 3 days ~ 0.8 oz daily. He reports that he does not use illicit drugs.  He reports he lives in an apartment with his wife.    Family History   I have reviewed this patient's family history and updated it with pertinent information if needed.   Family History   Problem " Relation Age of Onset     Lung Cancer Mother      Heart Failure Father       age 87     Review of Systems   The 10 point Review of Systems is negative other than noted in the HPI or here.     Physical Exam   Temp: 97.8  F (36.6  C) Temp src: Oral BP: 108/72   Heart Rate: 73 Resp: 20 SpO2: 96 % O2 Device: Oxymask Oxygen Delivery: 5 LPM  Vital Signs with Ranges  Temp:  [97  F (36.1  C)-99.8  F (37.7  C)] 97.8  F (36.6  C)  Heart Rate:  [46-87] 73  Resp:  [12-26] 20  BP: (107-118)/(59-72) 108/72  MAP:  [66 mmHg-105 mmHg] 81 mmHg  Arterial Line BP: (103-158)/(45-71) 117/59  FiO2 (%):  [40 %] 40 %  SpO2:  [93 %-100 %] 96 %  189 lbs 13.06 oz    Constitutional: Awake, alert, cooperative, no apparent distress.  Eyes: Conjunctiva and pupils examined and normal.  HEENT: Moist mucous membranes, normal dentition.  Respiratory: Clear to auscultation bilaterally, no crackles or wheezing.  Cardiovascular: Irregular rate and rhythm, normal S1 and S2, harsh blowing systolic 4/6 murmur noted throughout but most predominantly L apex region.  GI: Soft, non-distended, non-tender, normal bowel sounds.  Lymph/Hematologic: No anterior cervical or supraclavicular adenopathy.  Skin: No rashes, no cyanosis, no edema.  Surgical dressing on R superior aspect of skin with shadowed drainage noted, SEBASTIAN drain present with sero-sanguinous drainage noted.  Musculoskeletal: No joint swelling, erythema or tenderness.  Neurologic: Cranial nerves 2-12 intact, normal strength and sensation.  Psychiatric: Alert, oriented to person, place and time, no obvious anxiety or depression.    Data   -Data reviewed today: All pertinent laboratory and imaging results from this encounter were reviewed. I personally reviewed the EKG tracing showing slow ventricular response atrial fibrillation, HR 40s.    Recent Labs  Lab 18  0547 18  1240   WBC 8.7 5.2   HGB 10.3* 11.3*   MCV 81 81    150   INR 1.37* 1.96*    141   POTASSIUM 4.0 4.0    CHLORIDE 112* 109   CO2 21 24   BUN 15 17   CR 0.86 0.86   ANIONGAP 9 8   BENNIE 8.6 9.3   GLC 90 87   ALBUMIN  --  3.5   PROTTOTAL  --  7.7   BILITOTAL  --  2.2*   ALKPHOS  --  93   ALT  --  17   AST  --  30   TROPI  --  <0.015     Imaging:  Recent Results (from the past 24 hour(s))   XR Chest Port 1 View    Narrative    CHEST ONE VIEW PORTABLE   4/7/2018 5:45 PM     HISTORY: Endotracheal tube positioning.     COMPARISON: None.      Impression    IMPRESSION: An endotracheal tube is in place, with tip approximately 5  cm above the akin. Opacity at the left lung base may represent  atelectasis or infection. Prior midline sternotomy. Aortic  calcification.    KACI BAUMANN MD   CT Head w/o Contrast    Narrative    CT SCAN OF THE HEAD WITHOUT CONTRAST   4/7/2018 9:06 PM     HISTORY: Status post craniotomy for subdural hematoma.    TECHNIQUE:  Axial images of the head and coronal reformations without  IV contrast material. Radiation dose for this scan was reduced using  automated exposure control, adjustment of the mA and/or kV according  to patient size, or iterative reconstruction technique.    COMPARISON: 4/7/2018 at 1315 hours.    FINDINGS: Right lateral subdural fluid collection has been evacuated  via a right parietal craniotomy. Air is seen in the subdural space  along with the drain and skin staples are seen over the craniotomy. A  small amount of layering blood remains in the posterior aspect of the  subdural space. There is persistent mass effect with persistent shift  of midline structures to the left by 1.5 cm, although this is less  than on the previous exam, and left lateral ventricle no longer  appears as trapped as it did earlier.    There is a small subdural fluid collection now present anterior to the  left frontal lobe inferiorly about 0.7 cm thick. No intraparenchymal  or intraventricular bleeding is seen.      Impression    IMPRESSION:  1. Status post evacuation of right lateral subdural fluid  collection  with expected postoperative changes.  2. Decrease in midline shift and decrease in trapping of left lateral  ventricle.  3. Small new subdural fluid collection anterior to left frontal lobe.  Continued surveillance is warranted.    FEDERICO LI MD   CT Head w/o Contrast    Narrative    CT SCAN OF THE HEAD WITHOUT CONTRAST April 8, 2018 9:05 AM     HISTORY: Pneumocephalus, evaluation of evolution of shift; status post  evacuation of right subdural hematoma.    TECHNIQUE: Axial images of the head and coronal reformations without  IV contrast material. Radiation dose for this scan was reduced using  automated exposure control, adjustment of the mA and/or kV according  to patient size, or iterative reconstruction technique.    COMPARISON: 4/7/2018.    FINDINGS: Mass effect continues to decrease, with midline structures  only 1.0 cm deviated to the left. The layering blood products in the  posterior aspect of what remains of the right lateral subdural fluid  collection have become higher in attenuation and thus likely indicate  clotting blood. There has been partial resorption of air within the  subdural space anteriorly. Postoperative changes from the right  lateral craniotomy and overlying skin staples and drain in the  subdural space are unchanged.      Impression    IMPRESSION: Evolving postoperative changes after evacuation of right  lateral subdural hematoma with decreased mass effect.    FEDERICO LI MD

## 2018-04-08 NOTE — PROGRESS NOTES
Pt is on full ventilatory support, No wean this shift. BBS diminished. Normal vitals. Will follow and assess for wean readiness.4/7/2018  Paul Raya

## 2018-04-08 NOTE — PROGRESS NOTES
04/08/18 1318   General Information   Onset Date 04/07/18   Start of Care Date 04/08/18   Referring Physician Jono Issa MD   Patient Profile Review/OT: Additional Occupational Profile Info See Profile for full history and prior level of function   Patient/Family Goals Statement did not state   Swallowing Evaluation Bedside swallow evaluation   Behaviorial Observations Alert;Confused;Distractible;Impulsive   Mode of current nutrition NPO   Respiratory Status O2 Supply   Type of O2 supply Nasal cannula   Comments Antonio Ramirez is a 73 year old male who was admitted on 4/7/2018 with a right subdural hematoma. Right Subdural hematoma: sustained after fall. S/p OR for craniotomy. SEBASTIAN Drain in place. Neuro ICU consult. Head CT showed improvement in shift this am but still some midline shift present. Pt intubated for procedure and extubated today at 0003. No history of dysphagia symptoms.    Clinical Swallow Evaluation   Oral Musculature generally intact   Structural Abnormalities none present   Dentition present and adequate   Mucosal Quality good   Mandibular Strength and Mobility intact   Oral Labial Strength and Mobility WFL   Lingual Strength and Mobility WFL   Velar Elevation intact   Buccal Strength and Mobility intact   Laryngeal Function Swallow;Voicing initiated  (weak vocal quality)   Oral Musculature Comments WFL; weak/hoarse voice   Additional Documentation Yes   Clinical Swallow Eval: Thin Liquid Texture Trial   Mode of Presentation, Thin Liquids self-fed   Oral Phase of Swallow Poor AP movement;Premature pharyngeal entry   Pharyngeal Phase of Swallow impaired;coughing/choking;repeated swallows;throat clearing;reduction in laryngeal movement   Successful Strategies Trialed During Procedure hard swallow  (when following commands)   Diagnostic Statement overt s/sx of aspiration; improved with cues for hard swallow   Clinical Swallow Eval: Puree Solid Texture Trial   Mode of Presentation,  Puree spoon;self-fed   Oral Phase, Puree WFL   Pharyngeal Phase, Puree impaired;throat clearing;reduction in laryngeal movement   Diagnostic Statement delayed throat clearing   Clinical Swallow Eval: Semisolid Texture Trial   Mode of Presentation, Semisolid self-fed   Oral Phase, Semisolid Poor AP movement   Pharyngeal Phase, Semisolid impaired;reduction in laryngeal movement;throat clearing   Diagnostic Statement delayed throat clearing   General Therapy Interventions   Planned Therapy Interventions Dysphagia Treatment   Dysphagia treatment Modified diet education;Instruction of safe swallow strategies   Swallow Eval: Clinical Impressions   Skilled Criteria for Therapy Intervention Skilled criteria met.  Treatment indicated.   Functional Assessment Scale (FAS) 4   Treatment Diagnosis Mild to moderate dysphagia   Diet texture recommendations Full liquid;Honey thick liquids   Recommended Feeding/Eating Techniques check mouth frequently for oral residue/pocketing;hard swallow w/ each bite or sip;maintain upright posture during/after eating for 30 mins;small sips/bites   Therapy Frequency daily   Predicted Duration of Therapy Intervention (days/wks) 1 week   Anticipated Discharge Disposition extended care facility   Risks and Benefits of Treatment have been explained. Yes   Patient, family and/or staff in agreement with Plan of Care Yes   Clinical Impression Comments SLP: RN reported that pt passed RN screen, however, coughed with water following. Neurologist also reported  coughing with secretions today and concern for withdrawal. Pt alert and following commands with encouragement. Oral motor exam WFL with adequate strength and ROM. ? cognition/sarcasm as pt inconsistently followed cues. Thin water trialed with immediate throat clear on 5/7 trials despite cues for chin tuck. Improved performance when pt used effortful swallow. Adequate oral phase with delayed throat clear on puree and semi solid textures. Improved  timing and no overt s/sx of aspiration with honey thick liquids via teaspoon. Due to MD report of concern for withdrawal, inconsistent use of safe swallow strategies/cognition and reduced airway protection with weak voice, recommend: full liquid, honey thick liquid diet by teaspoon. Pt upright for all intake with 1:1 assist for small sips by teaspoon.    Total Evaluation Time   Total Evaluation Time (Minutes) 19

## 2018-04-08 NOTE — PROVIDER NOTIFICATION
Dr. Issa notified of large amount of air bubbles in SEBASTIAN drain. Instructed to let SEBASTIAN drain to gravity with no suction.

## 2018-04-09 ENCOUNTER — APPOINTMENT (OUTPATIENT)
Dept: OCCUPATIONAL THERAPY | Facility: CLINIC | Age: 73
DRG: 025 | End: 2018-04-09
Payer: MEDICARE

## 2018-04-09 ENCOUNTER — APPOINTMENT (OUTPATIENT)
Dept: SPEECH THERAPY | Facility: CLINIC | Age: 73
DRG: 025 | End: 2018-04-09
Payer: MEDICARE

## 2018-04-09 LAB
BLD PROD TYP BPU: NORMAL
BLD PROD TYP BPU: NORMAL
BLD UNIT ID BPU: 0
BLD UNIT ID BPU: 0
BLOOD PRODUCT CODE: NORMAL
BLOOD PRODUCT CODE: NORMAL
BPU ID: NORMAL
BPU ID: NORMAL
INTERPRETATION ECG - MUSE: NORMAL
TRANSFUSION STATUS PATIENT QL: NORMAL

## 2018-04-09 PROCEDURE — 97535 SELF CARE MNGMENT TRAINING: CPT | Mod: GO

## 2018-04-09 PROCEDURE — 25000128 H RX IP 250 OP 636: Performed by: PSYCHIATRY & NEUROLOGY

## 2018-04-09 PROCEDURE — A9270 NON-COVERED ITEM OR SERVICE: HCPCS | Mod: GY | Performed by: INTERNAL MEDICINE

## 2018-04-09 PROCEDURE — 92526 ORAL FUNCTION THERAPY: CPT | Mod: GN | Performed by: SPEECH-LANGUAGE PATHOLOGIST

## 2018-04-09 PROCEDURE — 97530 THERAPEUTIC ACTIVITIES: CPT | Mod: GO

## 2018-04-09 PROCEDURE — 99233 SBSQ HOSP IP/OBS HIGH 50: CPT | Performed by: INTERNAL MEDICINE

## 2018-04-09 PROCEDURE — 40000133 ZZH STATISTIC OT WARD VISIT

## 2018-04-09 PROCEDURE — 25000132 ZZH RX MED GY IP 250 OP 250 PS 637: Mod: GY | Performed by: NURSE PRACTITIONER

## 2018-04-09 PROCEDURE — 12000000 ZZH R&B MED SURG/OB

## 2018-04-09 PROCEDURE — 25000132 ZZH RX MED GY IP 250 OP 250 PS 637: Mod: GY | Performed by: INTERNAL MEDICINE

## 2018-04-09 PROCEDURE — 40000225 ZZH STATISTIC SLP WARD VISIT: Performed by: SPEECH-LANGUAGE PATHOLOGIST

## 2018-04-09 PROCEDURE — A9270 NON-COVERED ITEM OR SERVICE: HCPCS | Mod: GY | Performed by: NURSE PRACTITIONER

## 2018-04-09 PROCEDURE — 25000128 H RX IP 250 OP 636: Performed by: NEUROLOGICAL SURGERY

## 2018-04-09 RX ORDER — LABETALOL HYDROCHLORIDE 5 MG/ML
5-10 INJECTION, SOLUTION INTRAVENOUS EVERY 4 HOURS PRN
Status: DISCONTINUED | OUTPATIENT
Start: 2018-04-09 | End: 2018-04-12 | Stop reason: HOSPADM

## 2018-04-09 RX ORDER — TORSEMIDE 10 MG/1
20 TABLET ORAL DAILY
Status: DISCONTINUED | OUTPATIENT
Start: 2018-04-09 | End: 2018-04-12 | Stop reason: HOSPADM

## 2018-04-09 RX ORDER — HYDRALAZINE HYDROCHLORIDE 20 MG/ML
10-20 INJECTION INTRAMUSCULAR; INTRAVENOUS EVERY 4 HOURS PRN
Status: DISCONTINUED | OUTPATIENT
Start: 2018-04-09 | End: 2018-04-12 | Stop reason: HOSPADM

## 2018-04-09 RX ADMIN — OXYCODONE HYDROCHLORIDE 5 MG: 5 TABLET ORAL at 07:47

## 2018-04-09 RX ADMIN — LEVETIRACETAM 500 MG: 5 INJECTION INTRAVENOUS at 21:46

## 2018-04-09 RX ADMIN — CEFAZOLIN SODIUM 2 G: 2 INJECTION, SOLUTION INTRAVENOUS at 06:34

## 2018-04-09 RX ADMIN — THIAMINE HYDROCHLORIDE 500 MG: 100 INJECTION, SOLUTION INTRAMUSCULAR; INTRAVENOUS at 07:41

## 2018-04-09 RX ADMIN — TORSEMIDE 20 MG: 20 TABLET ORAL at 11:53

## 2018-04-09 RX ADMIN — OXYCODONE HYDROCHLORIDE 5 MG: 5 TABLET ORAL at 04:05

## 2018-04-09 RX ADMIN — DOCUSATE SODIUM 100 MG: 100 CAPSULE, LIQUID FILLED ORAL at 21:46

## 2018-04-09 RX ADMIN — LEVETIRACETAM 500 MG: 5 INJECTION INTRAVENOUS at 08:39

## 2018-04-09 RX ADMIN — CARVEDILOL 3.12 MG: 3.12 TABLET, FILM COATED ORAL at 08:05

## 2018-04-09 RX ADMIN — CARVEDILOL 3.12 MG: 3.12 TABLET, FILM COATED ORAL at 21:46

## 2018-04-09 RX ADMIN — DOCUSATE SODIUM 100 MG: 100 CAPSULE, LIQUID FILLED ORAL at 08:05

## 2018-04-09 RX ADMIN — THIAMINE HYDROCHLORIDE 500 MG: 100 INJECTION, SOLUTION INTRAMUSCULAR; INTRAVENOUS at 14:39

## 2018-04-09 ASSESSMENT — PAIN DESCRIPTION - DESCRIPTORS: DESCRIPTORS: ACHING

## 2018-04-09 ASSESSMENT — ACTIVITIES OF DAILY LIVING (ADL): ADLS_ACUITY_SCORE: 13

## 2018-04-09 NOTE — PROGRESS NOTES
[facility]    Neurosurgery Progress Note    Date of Service (when I saw the patient): 04/09/2018     Assessment & Plan   Antonio Ramirez is a 73 year old male who was admitted on 4/7/2018. He presented to ER with mental status changes. He was found to have a large right SDH with midline shift. He was taken to OR by Dr. Jono Issa.  He underwent right frontotemporal parietal craniotomy for evacuation of SDH on 4-7-2018.  Today pt is lying in bed and remains neuro intact. Post op CT shows SDH evacuation and improvement of midline shift. Denies headache, states only mild pain at incision site when raising eyebrows. OK to transfer to floor today from NSG standpoint. SEBASTIAN drain was removed. Drain site closed with staples. Tip of drain intact upon removal.  Site appears clean without erythema, fluctuation, or induration.  Patient tolerated procedure without difficulty.         Active Problems:    S/P craniotomy    Assessment: post crani    Plan:   Transfer to floor today   Drain removed today  staple removal on 4-     Continue to monitor       Subdural hematoma (H)    Assessment: post crani    Plan: as above       I have discussed the following assessment and plan with Dr. Issa who is in agreement with initial plan and will follow up with further consultation recommendations.      Enedina Pfeiffer PA-C  [facility]  [address] [address]    Tel [phone]  Pager [phone]      Interval History   POD 2. Doing well. Pain well controlled.    Physical Exam   Temp: 98  F (36.7  C) Temp src: Oral BP: 128/76 Pulse: 66 Heart Rate: 57 Resp: 10 SpO2: 100 % O2 Device: None (Room air) Oxygen Delivery: 6 LPM  Vitals:    04/07/18 1730 04/09/18 0600   Weight: 189 lb 13.1 oz (86.1 kg) 194 lb 10.7 oz (88.3 kg)     Vital Signs with Ranges  Temp:  [98  F (36.7  C)-98.7  F (37.1  C)] 98  F (36.7  C)  Pulse:  [66] 66  Heart Rate:  [56-86]  57  Resp:  [10-30] 10  BP: ()/(50-80) 128/76  MAP:  [73 mmHg-85 mmHg] 81 mmHg  Arterial Line BP: (117-127)/(49-60) 117/59  SpO2:  [93 %-100 %] 100 %  I/O last 3 completed shifts:  In: 989.59 [P.O.:380; I.V.:609.59]  Out: 1260 [Urine:1130; Drains:130]    Heart Rate: 57, Blood pressure 128/76, pulse 66, temperature 98  F (36.7  C), temperature source Oral, resp. rate 10, weight 194 lb 10.7 oz (88.3 kg), SpO2 100 %.  194 lbs 10.66 oz  HEENT:  Normocephalic.  PERRLA.  EOM s intact.   Heart:  No peripheral edema  Lungs:  No SOB  Skin:  Warm and dry. Incision with staples intact covered with dressing.  Extremities:  No edema, cyanosis or clubbing.    NEUROLOGICAL EXAMINATION:   Mental status:  Alert and Oriented x 3, speech is fluent.  Cranial nerves:  II-XII intact.   Motor:  Strength 5/5 throughout. LLE BKA.  Sensation:  intact  Coordination:  Smooth finger to nose testing.   Negative pronator drift.       Medications     - MEDICATION INSTRUCTIONS -         torsemide  20 mg Oral Daily     levETIRAcetam  500 mg Intravenous Q12H     carvedilol  3.125 mg Oral BID     thiamine  500 mg Intravenous Q8H     ceFAZolin  2 g Intravenous Q8H     docusate sodium  100 mg Oral BID       Data   CBC RESULTS:   Recent Labs   Lab Test  04/08/18   0547   WBC  8.7   RBC  4.02*   HGB  10.3*   HCT  32.5*   MCV  81   MCH  25.6*   MCHC  31.7   RDW  19.2*   PLT  152     Basic Metabolic Panel:  Lab Results   Component Value Date     04/08/2018      Lab Results   Component Value Date    POTASSIUM 4.0 04/08/2018     Lab Results   Component Value Date    CHLORIDE 112 04/08/2018     Lab Results   Component Value Date    BENNIE 8.6 04/08/2018     Lab Results   Component Value Date    CO2 21 04/08/2018     Lab Results   Component Value Date    BUN 15 04/08/2018     Lab Results   Component Value Date    CR 0.86 04/08/2018     Lab Results   Component Value Date    GLC 90 04/08/2018     INR:  Lab Results   Component Value Date    INR 1.37  04/08/2018    INR 1.96 04/07/2018    INR 3.61 01/28/2018    INR 2.67 12/15/2017    INR 1.39 12/10/2017    INR 1.16 12/09/2017    INR 1.25 12/08/2017    INR 1.65 12/04/2017    INR 1.65 12/01/2017    INR 1.91 12/01/2017    INR 2.56 11/30/2017    INR 2.53 11/29/2017

## 2018-04-09 NOTE — PROGRESS NOTES
[facility]    Hospitalist Progress Note :     Hospital Cumulative Summary: Antonio Ramirez is a 73 year old male who was admitted on 4/7/2018.His past medical history is significant for alcoholism,amputation of left leg, anticardiolipin antibody syndrome, aortic stenosis, balance problems, cardiomyopathy, coronary artery disease,  hiatal hernia, hypercholesterolemia, essential hypertension, alcoholic liver disease and cirrhosis along with portal hypertension, low-grade B cell lymphoproliferative disorder, monoclonal gammopathy And pulmonary embolism in the past.  Patient presented to the hospital with subdural hematoma.  He sustained a fall early morning on the day of admission, EMS was called but patient initially refused but as he became more lethargic his wife called EMS again and he was transported to the emergency room.  At the time of arrival he was essentially unresponsive.  Head CT showed a subdural hematoma on the right with leftward shift.  He was taken to the OR for evacuation and underwent craniotomy.  Initially he was brought to the ICU intubated for postoperative management.  Patient was followed closely by neurosurgery and neuro critical care.  Postoperative CT scan showed subdural hemorrhage evacuation and improvement in the midline shift.  SEBASTIAN drain was removed patient was extubated and his care was transferred to Hospital medicine with the plan to transfer patient out of the ICU.    Assessment & Plan     Active Problems:  Right subdural hematoma 2/2 fall, on chronic anticoagulation s/p R frontoparietal craniotomy for evacuation of subdural hematoma, POD#2: Patient seems to be making clinical improvement.Sitting in bed, denying any headache.  Would like to eat, we discussed about getting him evaluated by speech therapy.    - Neurosurgery and neurocritical care following  - Seizure prophylaxis, keppra 500 mg BID IV  - Will defer drain and wound cares to neurosurgery; staple removal  4/21/2018  - On cefazolin while drain remains in place (and for suspected UTI, see below)  - POD#2 head CT noted showing improvement in midline shift, however, shift remains  - PT/OT/ST consulted, appreciate recommendations  - Roxicodone PRN for pain management  -Discontinue Lainez catheter.  -Transfer patient to neuro floor when bed is available.     Antiphospholipid antibody syndrome, IVC filter in place, on chronic anticoagulation.  History of pulmonary embolism, deep vein thrombosis: INR on admission 1.96, repeat 1.37    - Holding PTA warfarin and ASA; of note, pharmacy to adjust warfarin dose per chromogenic factor 10 when appropriate to resume   - Received reversal agents on admission including prothrombin complex concentrates and vitamin K.  - SBP goal < 140, recently transitioned off nicardipine.  - Will defer anticoagulation to neurosurgery when he can be started back      Paroxysmal atrial fibrillation on chronic anticoagulation with noted slowed ventricular response: Pt received atropine post operatively for bradycardia HR 30s  - Holding PTA warfarin  - Continuous telemetry ordered     Chronic anemia.  - Hemoglobin on admission 11.3, today 10.3; baseline range 8.0-11  - Will repeat CBC in am     History of alcohol use disorder.  Alcoholic cirrhosis with ascites, portal hypertension.  - Monitor for withdrawal symptoms.  - LFTs WNL except for mildly elevated total bili 2.2 on admission, will continue to monitor     HFrEF, EF 40-45%.  Cardiomyopathy.  Mild valvular aortic stenosis, moderate mitral regurgitation, mild to moderate tricuspid regurgitation, mild ascending aorta dilatation.  Coronary artery disease s/p 3-v CABG.  Thoracic aortic aneurysm without rupture.  - BNP on admission 4334  - Continue PTA BB  - Holding PTA ASA, warfarin  - Not on statin PTA due to intolerance  - Received lasix 20 mg IV yesterday  - start patient on PTA torsemide at this time, BMP tomorrow   - discontinue lainez  catheter     Hyperlipidemia.  - Does not tolerate statin therapy, not on PTA medication for above diagnosis; will continue to monitor at this time.     Essential hypertension.  - Current SBP goal <140, recently transitioned off nicardipine infusion  - Continue PTA coreg   - also started on his home torsedime     Chronic kidney disease, stage 3.  - History of ANSELMO requiring HD in 2010  - Creatinine on admission 0.86, today 0.86  - Will repeat BMP in am  - Avoid nephrotoxic agents as deemed appropriate     Suspected urinary tract infection.  - U/A noted positive nitrites, large leukocyte esterase and >182 WBC  - U/C pending, will continue to follow for sensitivities  - Continue cefazolin at this time     Gastroesophageal reflux disease.  - Not on any PTA medications for above diagnosis, will continue to monitor at this time.     Low grade B cell lymphoproliferative disorder.  History of prostate cancer s/p radiative seed implants.  - No acute issues, will continue to monitor.     Peripheral polyneuropathy.  - Holding PTA gabapentin at this time     DVT Prophylaxis: Pneumatic Compression Devices  Code Status: Full Code      # Pain Assessment:  Current Pain Score 4/9/2018   Patient currently in pain? denies   Pain score (0-10) 0   Pain location -   Pain descriptors -   CPOT pain score 0   - Antonio is experiencing pain due to arthritis and co morbidities and recent surgetry. Pain management was discussed and the plan was created in a collaborative fashion.  Antonio's response to the current recommendations: engaged  - Opioid regimen: Oxycodone 5 mg oral tablet every 3 hours, patient is also receiving IV fentanyl at this time which will be discontinued once he gets transferred from the ICU.  - Response to opioid medications: Reduction of symptoms   - Bowel regimen: senna and miralax  - Pharmacologic adjuvants: Acetaminophen    DVT Prophylaxis: Compression stockings, will start patient back on Coumadin once okay with  neurosurgery.    Code Status: Full Code    Disposition: Transfer patient out of ICU to neuro floor, tentative discharge in the next couple of days as patient continues to make clinical improvement and once okay with neurosurgery.    Mariely Hogue MD, FACP  Text Page (7am - 6pm)      Interval History   Patient was seen and examined earlier this morning, sitting in bed, responding appropriately although slightly slow, sometimes sarcastic. his pain is well controlled currently on the current medications.  We discussed about removing his Levin catheter , we also discussed about getting him evaluated by speech therapy so his diet can be advanced.    -Data reviewed today: I reviewed all new labs and imaging results over the last 24 hours.    I personally reviewed no images or EKG's today.    Physical Exam   Temp: 98  F (36.7  C) Temp src: Oral BP: 128/76 Pulse: 66 Heart Rate: 57 Resp: 10 SpO2: 100 % O2 Device: None (Room air) Oxygen Delivery: 6 LPM  Vitals:    04/07/18 1730 04/09/18 0600   Weight: 86.1 kg (189 lb 13.1 oz) 88.3 kg (194 lb 10.7 oz)     Vital Signs with Ranges  Temp:  [98  F (36.7  C)-98.7  F (37.1  C)] 98  F (36.7  C)  Pulse:  [66] 66  Heart Rate:  [56-86] 57  Resp:  [10-30] 10  BP: ()/(50-80) 128/76  MAP:  [73 mmHg-85 mmHg] 81 mmHg  Arterial Line BP: (117-127)/(49-60) 117/59  SpO2:  [93 %-100 %] 100 %  I/O last 3 completed shifts:  In: 989.59 [P.O.:380; I.V.:609.59]  Out: 1260 [Urine:1130; Drains:130]    Constitutional: Awake, alert, cooperative, no apparent distress.  Eyes: Conjunctiva and pupils examined and normal.  HEENT: Moist mucous membranes, normal dentition.  Respiratory: Clear to auscultation bilaterally, no crackles or wheezing.  Cardiovascular: Irregular rate and rhythm, normal S1 and S2, harsh blowing systolic 4/6 murmur noted throughout but most predominantly L apex region.  GI: Soft, non-distended, non-tender, normal bowel sounds.  Lymph/Hematologic: No anterior cervical or  supraclavicular adenopathy.  Skin: No rashes, no cyanosis, no edema.  Surgical dressing on R superior aspect of skin with shadowed drainage noted, SEBASTIAN drain present with sero-sanguinous drainage noted.  Musculoskeletal: No joint swelling, erythema or tenderness in right leg, status post left leg amputation  Neurologic: Cranial nerves 2-12 intact, normal strength and sensation.  Psychiatric: Alert, oriented to person, place and time, no obvious anxiety or depression.    Medications     - MEDICATION INSTRUCTIONS -         torsemide  20 mg Oral Daily     levETIRAcetam  500 mg Intravenous Q12H     carvedilol  3.125 mg Oral BID     thiamine  500 mg Intravenous Q8H     ceFAZolin  2 g Intravenous Q8H     docusate sodium  100 mg Oral BID       Data     Recent Labs  Lab 04/08/18  0547 04/07/18  1240   WBC 8.7 5.2   HGB 10.3* 11.3*   MCV 81 81    150   INR 1.37* 1.96*    141   POTASSIUM 4.0 4.0   CHLORIDE 112* 109   CO2 21 24   BUN 15 17   CR 0.86 0.86   ANIONGAP 9 8   BENNIE 8.6 9.3   GLC 90 87   ALBUMIN  --  3.5   PROTTOTAL  --  7.7   BILITOTAL  --  2.2*   ALKPHOS  --  93   ALT  --  17   AST  --  30   TROPI  --  <0.015       Imaging:   No results found for this or any previous visit (from the past 24 hour(s)).

## 2018-04-09 NOTE — PLAN OF CARE
Problem: Patient Care Overview  Goal: Plan of Care/Patient Progress Review  Outcome: No Change  Pts alert and oriented. VSS. Afib CVR. Clear lung sounds. Ok UOP per lainez. No BM. S/p craniotomy for evacuation of SDH. Minimal drain from Patricio drain. Suction to gravity. Awaiting for floor transfer. Continue to monitor.

## 2018-04-09 NOTE — PLAN OF CARE
Problem: Patient Care Overview  Goal: Plan of Care/Patient Progress Review  PT:  Attempted to see; chart reviewed.  Spoke with nursing who stated patient had been up in chair for 4 hours and just returned to bed.  Spoke with patient who was fatigued from being up.  Deferred eval for today.

## 2018-04-09 NOTE — PLAN OF CARE
"Problem: Patient Care Overview  Goal: Plan of Care/Patient Progress Review  Discharge Planner OT   Patient plan for discharge: None stated.   Current status: Confused, stating he has a broken leg, a \"medical professional\" told him this. SB A to EOB, MIN A to chair with walker. Set up to don prosthesis.   Barriers to return to prior living situation: needs A with ADL and mobility, confusion.   Recommendations for discharge: ARC.  Rationale for recommendations: Not at baseline.       Entered by: Mary Mayo 04/09/2018 10:26 AM         "

## 2018-04-09 NOTE — PROGRESS NOTES
Neuroscience Intensive Care Progress Note    2018    Antonio Ramirez is a 73 year old year old male admitted on 2018 with subdural hematoma. POD#1 from evacuation with subdural drain placement.     Problem List  1. SDH, s/p evacuation  2. EtOH abuse  3. Paroxysmal AFib  4. Antiphospholipid syndrome  5. HFrEF  6. Cirrhosis w/ ascites    24 hour events:  Stable. Minimal drainage from subdural drain.     24 Hour Vital Signs Summary:  Temperatures:  Current - Temp: 98  F (36.7  C); Max - Temp  Av.3  F (36.8  C)  Min: 98  F (36.7  C)  Max: 98.7  F (37.1  C)  Respiration range: Resp  Av.5  Min: 10  Max: 30  Pulse range: Pulse  Av  Min: 66  Max: 66  Blood pressure range: Systolic (24hrs), Av , Min:96 , Max:145   ; Diastolic (24hrs), Av, Min:50, Max:83    Pulse oximetry range: SpO2  Av.3 %  Min: 93 %  Max: 100 %    Ventilator Settings  FiO2 (%): 40 %  Resp: 13      Intake/Output Summary (Last 24 hours) at 18 1113  Last data filed at 18 1100   Gross per 24 hour   Intake           309.17 ml   Output             1100 ml   Net          -790.83 ml       Arterial Line BP: (117-127)/(49-60) 117/59  MAP:  [73 mmHg-85 mmHg] 81 mmHg  BP - Mean:  [] 110    Current Medications:    torsemide  20 mg Oral Daily     levETIRAcetam  500 mg Intravenous Q12H     carvedilol  3.125 mg Oral BID     thiamine  500 mg Intravenous Q8H     ceFAZolin  2 g Intravenous Q8H     docusate sodium  100 mg Oral BID       PRN Medications:  oxyCODONE IR, - MEDICATION INSTRUCTIONS -, sodium chloride 0.9%, acetaminophen, bisacodyl **OR** bisacodyl **OR** bisacodyl, magnesium hydroxide, bisacodyl, naloxone, albuterol, fentaNYL, ondansetron **OR** ondansetron, prochlorperazine **OR** prochlorperazine **OR** prochlorperazine, metoclopramide **OR** metoclopramide, senna-docusate **OR** senna-docusate, polyethylene glycol, sodium phosphate, potassium chloride, potassium chloride, potassium chloride, potassium  "chloride with lidocaine, potassium chloride, magnesium sulfate, sodium phosphate, sodium phosphate, sodium phosphate    Infusions:    - MEDICATION INSTRUCTIONS -         Allergies   Allergen Reactions     Hmg-Coa-R Inhibitors Other (See Comments)     Rhabdomyolysis occurred within a couple days     Ciprofloxacin Itching     Severe itching \"like ants were crawling\"       Physical Examination:  /74  Pulse 66  Temp 98  F (36.7  C) (Oral)  Resp 13  Wt 88.3 kg (194 lb 10.7 oz)  SpO2 99%  BMI 26.4 kg/m2  MENTAL STATUS:  Alert, oriented x3.  Speech fluent with normal naming, repetition, comprehension.  Good right-left orientation, body part naming, calculation skills. CRANIAL NERVES:  Disks flat. Pupils are equal, round, reactive to light.  Extraocular movements full.  Visual fields full.  Facial sensation, movement normal.  Palate moves symmetrically.  Tongue midline.  Sternocleidomastoid and trapezius strength intact.  Neck strength was normal.  No bruits.   NEUROLOGIC:  Motor 5/5.  Reflexes 2/4.  Toe signs downgoing.  Good finger-nose-finger, fine finger movement, heel-shin maneuver, sensation to light touch, position sense and double simultaneous stimulation.      Labs/Studies:  Recent Labs   Lab Test  04/08/18   0547  04/07/18   1240  01/28/18   2247   NA  142  141  137   POTASSIUM  4.0  4.0  4.1   CHLORIDE  112*  109  104   CO2  21  24  27   ANIONGAP  9  8  6   GLC  90  87  96   BUN  15  17  17   CR  0.86  0.86  1.24   BENNIE  8.6  9.3  8.4*   WBC  8.7  5.2  5.4   RBC  4.02*  4.42  3.39*   HGB  10.3*  11.3*  9.5*   PLT  152  150  168       Recent Labs   Lab Test  04/08/18   0547  04/07/18   1240  01/28/18   2247   12/04/17   0806   INR  1.37*  1.96*  3.61*   < >  1.65*   PTT   --    --    --    --   37    < > = values in this interval not displayed.           Recent Labs  Lab 04/07/18  2343 04/07/18  1737   PH 7.37 7.39   PCO2 36 35   PO2 165* 161*   HCO3 21 21   O2PER 50  --  "           Imaging:  reviewed      Assessment/Plan  Neuro:  - Subdural hematoma status post evacuation-postop day 2, subdural drain still in place, placed to gravity with some concern for air leak.  Continue to follow output and consistency.   - DC drain per neurosurgery  - Alternate nonrebreather oxygen with room air every 2 hours to aid in resolution of pneumocephalus.   - Keppra 500mg BID for 2 weeks for seizure prophylaxis s/p TBI with subdural.  - EtOH abuse-follow closely for withdrawal  - If remains stable during the day , reduce neuro checks to Q4 overnight     CV:    SBP goal: Less than 140  Paroxysmal atrial fibrillation-hold on anticoagulation at this point in time, maintained rate control     GI:  History of liver cirrhosis-follow liver enzymes and medications that may exacerbate liver dysfunction     Renal:   History of CKD-hydration with IV fluids, electrolyte protocol     Heme:   History of antiphospholipid syndrome-hold on anticoagulation at this point in time until cleared per neurosurgery.     Code: Full Code    Plan discussed with Dr. Ariana Mac  Fellow

## 2018-04-09 NOTE — PROGRESS NOTES
[facility]  Neuroscience Intensive Care Progress Note  2018    Antonio Ramirez is a 73 year old year old male admitted on 2018 with subdural hematoma. POD#1 from evacuation with subdural drain placement.    Problem List  1. SDH, s/p evacuation  2. EtOH abuse  3. Paroxysmal AFib  4. Antiphospholipid syndrome  5. HFrEF  6. Cirrhosis w/ ascites    24 hour events:  Extubated overnight, improved mental status.    24 Hour Vital Signs Summary:  Temperatures:  Current - Temp: 98.2  F (36.8  C); Max - Temp  Av  F (36.7  C)  Min: 97  F (36.1  C)  Max: 99.8  F (37.7  C)  Respiration range: Resp  Av  Min: 10  Max: 26  Pulse range: No Data Recorded  Blood pressure range: Systolic (24hrs), Av , Min:107 , Max:129   ; Diastolic (24hrs), Av, Min:59, Max:80    Pulse oximetry range: SpO2  Av.6 %  Min: 93 %  Max: 100 %    Ventilator Settings  Ventilation Mode: CPAP/PS  (Continuous positive airway pressure with Pressure Support)  FiO2 (%): 40 %  Rate Set (breaths/minute): 14 breaths/min  Tidal Volume Set (mL): 530 mL  PEEP (cm H2O): 5 cmH2O  Pressure Support (cm H2O): 5 cmH2O  Oxygen Concentration (%): 40 %  Resp: 17      Intake/Output Summary (Last 24 hours) at 18 1954  Last data filed at 18 1800   Gross per 24 hour   Intake          2000.38 ml   Output             1130 ml   Net           870.38 ml       Current Medications:    levETIRAcetam  500 mg Intravenous Q12H     carvedilol  3.125 mg Oral BID     ceFAZolin  2 g Intravenous Q8H     docusate sodium  100 mg Oral BID       PRN Medications:  oxyCODONE IR, - MEDICATION INSTRUCTIONS -, sodium chloride 0.9%, acetaminophen, bisacodyl **OR** bisacodyl **OR** bisacodyl, magnesium hydroxide, bisacodyl, naloxone, albuterol, fentaNYL, ondansetron **OR** ondansetron, prochlorperazine **OR** prochlorperazine **OR** prochlorperazine, metoclopramide **OR** metoclopramide, senna-docusate **OR** senna-docusate, polyethylene glycol,  "sodium phosphate, potassium chloride, potassium chloride, potassium chloride, potassium chloride with lidocaine, potassium chloride, magnesium sulfate, sodium phosphate, sodium phosphate, sodium phosphate    Infusions:    - MEDICATION INSTRUCTIONS -         Allergies   Allergen Reactions     Hmg-Coa-R Inhibitors Other (See Comments)     Rhabdomyolysis occurred within a couple days     Ciprofloxacin Itching     Severe itching \"like ants were crawling\"       Physical Examination:  /74  Pulse 58  Temp 98.2  F (36.8  C) (Oral)  Resp 17  Wt 86.1 kg (189 lb 13.1 oz)  SpO2 96%  BMI 25.74 kg/m2  GENERAL APPEARANCE ADULT: Alert, in no acute distress  Neurological Examination  MENTAL STATE:  Awake, alert, oriented to place and self     CRANIAL NERVES:  Cranial nerves were normal.  CN 2         Visual fields full to confrontation.  CN 3, 4, 6  Pupils round, 4 mm in diameter, equally reactive to light.  Lids symmetric; no ptosis.  EOMs normal alignment, full range with normal saccades, pursuit and convergence.  No nystagmus.  CN 5  Facial sensation intact bilaterally.  CN 7  Normal and symmetric facial strength.  Nasolabial folds symmetric.  CN 8  Hearing intact to finger rub.  CN 9  Palate elevates symmetrically.  CN 11  Normal strength of shoulder shrug and neck turning.  CN 12  Tongue midline, with normal bulk and strength.  No fasciculations.    MOTOR:  Motor exam was normal.  Muscle bulk and tone were normal in both upper and lower extremities.  Muscle strength was 5/5 in distal and proximal muscles in both upper and lower extremities.  No fasciculations, tremor or other abnormal movements were present.  Left BKA is noted    REFLEXES:  Babinski: toe downgoing to plantar stimulation.  No clonus, frontal release signs or other pathologic reflexes present.    SENSORY:  Sensory exam was normal. In both upper and lower extremities, sensation was intact to light touch.     COORDINATION:   Coordination exam was " normal.  In both upper extremities, finger-nose-finger was intact without dysmetria or overshoot.    GAIT:  Gait was not present due to presence of BKA and no prosthesis.    HENT: Mouth and posterior oropharynx normal, moist mucous membranes  RESP: lungs clear to auscultation   CV: regular rate and rhythm, no murmur, rub, or gallop  ABDOMEN: normal bowel sounds, soft, nontender, no hepatosplenomegaly or other masses  MSK: 5/5 strength in upper and lower extremities bilaterally  SKIN: no suspicious lesions or rashes    Labs/Studies:  CMP  Recent Labs  Lab 04/08/18  0547 04/07/18  1240    141   POTASSIUM 4.0 4.0   CHLORIDE 112* 109   CO2 21 24   ANIONGAP 9 8   GLC 90 87   BUN 15 17   CR 0.86 0.86   GFRESTIMATED 86 86   GFRESTBLACK >90 >90   BENNIE 8.6 9.3   MAG 1.7  --    PHOS 2.3*  --    PROTTOTAL  --  7.7   ALBUMIN  --  3.5   BILITOTAL  --  2.2*   ALKPHOS  --  93   AST  --  30   ALT  --  17     CBC  Recent Labs  Lab 04/08/18  0547 04/07/18  1240   WBC 8.7 5.2   RBC 4.02* 4.42   HGB 10.3* 11.3*   HCT 32.5* 35.8*   MCV 81 81   MCH 25.6* 25.6*   MCHC 31.7 31.6   RDW 19.2* 19.2*    150     INR  Recent Labs  Lab 04/08/18  0547 04/07/18  1240   INR 1.37* 1.96*     Arterial Blood Gas  Recent Labs  Lab 04/07/18  2343 04/07/18  1737   PH 7.37 7.39   PCO2 36 35   PO2 165* 161*   HCO3 21 21   O2PER 50  --        Imaging:   Noncontrast CT of the head demonstrates postsurgical changes with pneumocephalus and subdural drain in place.    Assessment/Plan  Neuro:  Subdural hematoma status post evacuation-postop day 1, subdural drain still in place, placed to gravity with some concern for air leak.  Continue to follow output and consistency.  Will likely DC drain once consistency looks more like CSF.  Alternate nonrebreather oxygen with room air every 2 hours to aid in resolution of pneumocephalus.   Keep with Keppra 500mg BID for 2 weeks for seizure prophylaxis s/p TBI with subdural.  EtOH abuse-follow closely for  withdrawl    CV:    SBP goal: Less than 140  Paroxysmal atrial fibrillation-hold on anticoagulation at this point in time, maintained rate control    GI:  History of liver cirrhosis-follow liver enzymes and medications that may exacerbate liver dysfunction    Renal:   History of CKD-hydration with IV fluids, electrolyte protocol    Heme:   History of antiphospholipid syndrome-hold on anticoagulation at this point in time until cleared per neurosurgery.    Code: Full Code      Delgado Genao MD  Vascular Neurology/Neurocritical Care  Text Page - [phone]        Neurocritical care time:  I spent 35 minutes bedside and on the inpatient unit today managing the neurocritical care of Antonio Ramirez in relation to the issues listed in this note. Patient is critically ill / has very high risk of deterioration

## 2018-04-09 NOTE — PLAN OF CARE
Problem: Patient Care Overview  Goal: Plan of Care/Patient Progress Review  Outcome: Improving  Patient sent up to 7th floor with all belongings (ipad, cell phone, , sweatshirt). Report given to RN. Wife updated.

## 2018-04-09 NOTE — PLAN OF CARE
Problem: Patient Care Overview  Goal: Plan of Care/Patient Progress Review  Discharge Planner SLP   Patient plan for discharge: Patient not able to state.   Current status:  Patient was seen for swallow treatment. When I walked into the room he was up in the chair eating his second popicle.  Throat clearing noted with all bites. Patient given a sip of water from the glass and demonstrated overt Sx of aspiration. Therefore, popicle was removed. He tolerated trials of honey thick liquids by the spoon. He demonstrated a delayed swallow response and reduced laryngeal elevation with nectar thick liquids and pudding. Suspect pharyngeal retention with pudding and mild throat clearing if not following strict use of swallow strategies. Recommend: 1. Advance to a Dysphagia Diet level 1 with nectar thick liquids by spoon. 2. Supervision, up in a chair, liquids by spoon, hard double swallow, and alternate liquids/solids. 3. Hold diet if increased throat clearing/coughing. 4. SLP will f/u at a meal on 4/10/18.   Barriers to return to prior living situation: Deconditioning and cognition.   Recommendations for discharge: ARC  Rationale for recommendations: Continue ST at [facility] for swallowing and complete a cognitive linguistic evaluation       Entered by: Niki Benavidez 04/09/2018 1:09 PM

## 2018-04-09 NOTE — PLAN OF CARE
Problem: Patient Care Overview  Goal: Plan of Care/Patient Progress Review  Pt A&Ox4, VSS on RA. Denies pain, HA. Baseline numbness and tingling in extremities. Otherwise neuros intact. L BKA prothesis in use. Voiding without difficulty, voids standing at bedside. Dressing has dried drainage. LS clear. PIVs SL.

## 2018-04-09 NOTE — PROVIDER NOTIFICATION
Page to hospitalist for PRN antihypertensives. Neuro note 4/8 states less than 140, no PRNs currently ordered for transfer to floor.     TORB from Dr Hogue: PRN hydralazine and labetolol for greater than 140. Give hydralazine first.

## 2018-04-09 NOTE — CONSULTS
"CLINICAL NUTRITION SERVICES  -  ASSESSMENT NOTE      Recommendations Ordered by Registered Dietitian (RD):   Vanilla Magic Shake once daily at 10 am.   Malnutrition: (4/9)  % Intake:  </= 50% for >/= 1 month (severe malnutrition)  Subcutaneous Fat Loss:  Orbital region mild depletion and Upper arm region mild depletion  Muscle Loss:  Clavicle bone region moderate depletion  Fluid Retention:  None noted    Malnutrition Diagnosis: Severe malnutrition  In Context of:  Acute illness or injury  Chronic illness or disease        REASON FOR ASSESSMENT  Antonio Ramirez is a 73 year old male seen by Registered Dietitian for Admission Nutrition Risk Screen - Reduced oral intake over the last month    NUTRITION HISTORY  - Information obtained from patient and EPIC records.  - Patient is on a regular diet at home.  - Typical food/fluid intake is fair at best.  - Diet history:  Pt vague historian with regards to usual oral intake.  Pt states his wife does the shopping and cooking at home.  He has 1 meal per day (dinner) but small snacks here and there.   - Supplements:  1 bottle vanilla Ensure per day.   - No food allergies/intolerances.ETOH abuse - drinking \"less\" per family report. Has remote history of withdrawal per daughter, but none recent  Pt feels he has lost weight \"all over.\"      CURRENT NUTRITION ORDERS  Diet Order:     Dysphagia - DD1 with nectar thick liquids     Current Intake/Tolerance:  4/7:  Surgery - Right frontoparietal craniotomy for evacuation of subdural hematoma  4/7:  Intubated  4/8:  Extubated  4/8:  SLP bedside swallow - mild-mod dysphagia --> FL/HT liquids  4/9:  SLP upgraded diet to DD1/NT liquids    Pt took 380 mL oral yesteray  He denies eating anything yet today.  He would be receptive to receiving a vanilla flavored thickened shake once daily (am) and requested that I send one now.    PHYSICAL FINDINGS  Observed  Muscle Wasting - clavicle  Subcutaneous fat loss - orbital, arms  Obtained from " "Chart/Interdisciplinary Team  Muscle Wasting and Subcutaneous fat loss - \"frail\"  Left BKA    ANTHROPOMETRICS  Height:  6 ft  Admit Weight:  86.1 kg   Body mass index is 27.3 kg/(m^2) - Adjusted for left BKA  Weight Status:  Normal BMI  IBW:  76 kg  % IBW:  1113%  Weight History:    Weight loss 19.3 kg (18%) over the past 4 months.  Weight loss 12.8 kg (13%) over the past 1 month.    Wt Readings from Last 20 Encounters:   04/09/18 88.3 kg (194 lb 10.7 oz)   03/14/18 98.9 kg (218 lb)   02/05/18 109.3 kg (241 lb)   01/29/18 104.7 kg (230 lb 14.4 oz)   01/05/18 99.8 kg (220 lb)   12/22/17 108 kg (238 lb)   12/20/17 108.9 kg (240 lb)   12/18/17 113.2 kg (249 lb 9.6 oz)   12/15/17 113.2 kg (249 lb 9.6 oz)   12/14/17 113.2 kg (249 lb 9.6 oz)   12/13/17 113.2 kg (249 lb 9.6 oz)   12/11/17 112.9 kg (249 lb)   12/08/17 105.8 kg (233 lb 4 oz)   09/25/17 102.1 kg (225 lb)   08/31/17 106.6 kg (235 lb)   06/30/17 115.2 kg (254 lb)   06/27/17 111.6 kg (246 lb)   05/30/17 111.6 kg (246 lb)   05/24/17 114.3 kg (251 lb 14.4 oz)   10/12/16 103.6 kg (228 lb 5 oz)     LABS  Labs reviewed    MEDICATIONS  Medications reviewed  Phos 2.3 (L)  BUN 15 (NL) - Cr 0.86 (NL) - Pt with CKD    ASSESSED NUTRITION NEEDS PER APPROVED PRACTICE GUIDELINES:    Dosing Weight:  86.1 kg (admit wt)  Estimated Energy Needs:  9112-6379 kcals (25-30 Kcal/Kg)  Justification: maintenance  Estimated Protein Needs:   grams protein (1.1-1.3 g pro/Kg)  Justification: Repletion, Post op, and CKD  Estimated Fluid Needs:   1828-2053 mL (1 mL/Kcal)  Justification: maintenance    MALNUTRITION:  % Weight Loss:  > 5% in 1 month (severe malnutrition)  % Intake:  </= 50% for >/= 1 month (severe malnutrition)  Subcutaneous Fat Loss:  Orbital region mild depletion and Upper arm region mild depletion  Muscle Loss:  Clavicle bone region moderate depletion  Fluid Retention:  None noted    Malnutrition Diagnosis: Severe malnutrition  In Context of:  Acute illness or " injury  Chronic illness or disease    NUTRITION DIAGNOSIS:  Inadequate oral intake related to decreased appetite and dysphagia s/p brain surgery as evidenced by oral intake poor and recent 13% weight loss over past month      NUTRITION INTERVENTIONS  Recommendations / Nutrition Prescription  Diet:  DD1 with nectar thick liquids + Vanilla Magic Shake once daily at 10 am.    Implementation  Nutrition education: Provided education on the use of supplements to optimize intake.  Collaboration and Referral of Nutrition care - Pt was discussed during ICU interdisciplinary rounds this morning.  Medical Food Supplement - Ordered Magic Shake once daily as above and ordered one to be sent now.    Nutrition Goals  Pt will consume 50-75% of at least 2 meals per day and > 75% supplement.      MONITORING AND EVALUATION:  Progress towards goals will be monitored and evaluated per protocol and Practice Guidelines    Lauren Martinez, KELSI, LD, CNSC

## 2018-04-09 NOTE — PROGRESS NOTES
ICU Multi-Disciplinary Note  Patient condition reviewed and discussed while on multidisciplinary rounds today.   The Critical Care service will continue to follow peripherally while patient is within the ICU. We are readily available should issues arise.  Please feel free to contact us for critical care issues with which we may be of assistance. For all other concerns, please contact primary service first.     Navarro May

## 2018-04-10 ENCOUNTER — APPOINTMENT (OUTPATIENT)
Dept: OCCUPATIONAL THERAPY | Facility: CLINIC | Age: 73
DRG: 025 | End: 2018-04-10
Payer: MEDICARE

## 2018-04-10 ENCOUNTER — APPOINTMENT (OUTPATIENT)
Dept: SPEECH THERAPY | Facility: CLINIC | Age: 73
DRG: 025 | End: 2018-04-10
Payer: MEDICARE

## 2018-04-10 ENCOUNTER — APPOINTMENT (OUTPATIENT)
Dept: PHYSICAL THERAPY | Facility: CLINIC | Age: 73
DRG: 025 | End: 2018-04-10
Attending: INTERNAL MEDICINE
Payer: MEDICARE

## 2018-04-10 LAB
ANION GAP SERPL CALCULATED.3IONS-SCNC: 10 MMOL/L (ref 3–14)
BACTERIA SPEC CULT: ABNORMAL
BLD PROD TYP BPU: NORMAL
BUN SERPL-MCNC: 15 MG/DL (ref 7–30)
CALCIUM SERPL-MCNC: 8.8 MG/DL (ref 8.5–10.1)
CHLORIDE SERPL-SCNC: 111 MMOL/L (ref 94–109)
CO2 SERPL-SCNC: 22 MMOL/L (ref 20–32)
CREAT SERPL-MCNC: 0.93 MG/DL (ref 0.66–1.25)
ERYTHROCYTE [DISTWIDTH] IN BLOOD BY AUTOMATED COUNT: 19.2 % (ref 10–15)
GFR SERPL CREATININE-BSD FRML MDRD: 79 ML/MIN/1.7M2
GLUCOSE SERPL-MCNC: 89 MG/DL (ref 70–99)
HCT VFR BLD AUTO: 31.1 % (ref 40–53)
HGB BLD-MCNC: 9.7 G/DL (ref 13.3–17.7)
INR PPP: 1.58 (ref 0.86–1.14)
MCH RBC QN AUTO: 25.1 PG (ref 26.5–33)
MCHC RBC AUTO-ENTMCNC: 31.2 G/DL (ref 31.5–36.5)
MCV RBC AUTO: 81 FL (ref 78–100)
NUM BPU REQUESTED: 0
PLATELET # BLD AUTO: 131 10E9/L (ref 150–450)
POTASSIUM SERPL-SCNC: 3.4 MMOL/L (ref 3.4–5.3)
RBC # BLD AUTO: 3.86 10E12/L (ref 4.4–5.9)
SODIUM SERPL-SCNC: 143 MMOL/L (ref 133–144)
SPECIMEN SOURCE: ABNORMAL
WBC # BLD AUTO: 6.2 10E9/L (ref 4–11)

## 2018-04-10 PROCEDURE — 85610 PROTHROMBIN TIME: CPT | Performed by: PSYCHIATRY & NEUROLOGY

## 2018-04-10 PROCEDURE — 92526 ORAL FUNCTION THERAPY: CPT | Mod: GN | Performed by: SPEECH-LANGUAGE PATHOLOGIST

## 2018-04-10 PROCEDURE — 36415 COLL VENOUS BLD VENIPUNCTURE: CPT | Performed by: PSYCHIATRY & NEUROLOGY

## 2018-04-10 PROCEDURE — 25000128 H RX IP 250 OP 636: Performed by: PSYCHIATRY & NEUROLOGY

## 2018-04-10 PROCEDURE — 85027 COMPLETE CBC AUTOMATED: CPT | Performed by: PSYCHIATRY & NEUROLOGY

## 2018-04-10 PROCEDURE — 25000132 ZZH RX MED GY IP 250 OP 250 PS 637: Mod: GY | Performed by: NURSE PRACTITIONER

## 2018-04-10 PROCEDURE — 12000000 ZZH R&B MED SURG/OB

## 2018-04-10 PROCEDURE — 97530 THERAPEUTIC ACTIVITIES: CPT | Mod: GP | Performed by: PHYSICAL THERAPIST

## 2018-04-10 PROCEDURE — 97116 GAIT TRAINING THERAPY: CPT | Mod: GP | Performed by: PHYSICAL THERAPIST

## 2018-04-10 PROCEDURE — 99232 SBSQ HOSP IP/OBS MODERATE 35: CPT | Performed by: INTERNAL MEDICINE

## 2018-04-10 PROCEDURE — 97535 SELF CARE MNGMENT TRAINING: CPT | Mod: GO | Performed by: OCCUPATIONAL THERAPY ASSISTANT

## 2018-04-10 PROCEDURE — 40000225 ZZH STATISTIC SLP WARD VISIT: Performed by: SPEECH-LANGUAGE PATHOLOGIST

## 2018-04-10 PROCEDURE — 25000132 ZZH RX MED GY IP 250 OP 250 PS 637: Mod: GY | Performed by: INTERNAL MEDICINE

## 2018-04-10 PROCEDURE — 25000128 H RX IP 250 OP 636: Performed by: INTERNAL MEDICINE

## 2018-04-10 PROCEDURE — 36415 COLL VENOUS BLD VENIPUNCTURE: CPT | Performed by: PHYSICIAN ASSISTANT

## 2018-04-10 PROCEDURE — A9270 NON-COVERED ITEM OR SERVICE: HCPCS | Mod: GY | Performed by: INTERNAL MEDICINE

## 2018-04-10 PROCEDURE — A9270 NON-COVERED ITEM OR SERVICE: HCPCS | Mod: GY | Performed by: NURSE PRACTITIONER

## 2018-04-10 PROCEDURE — 27210995 ZZH RX 272: Performed by: INTERNAL MEDICINE

## 2018-04-10 PROCEDURE — 97162 PT EVAL MOD COMPLEX 30 MIN: CPT | Mod: GP | Performed by: PHYSICAL THERAPIST

## 2018-04-10 PROCEDURE — 40000193 ZZH STATISTIC PT WARD VISIT: Performed by: PHYSICAL THERAPIST

## 2018-04-10 PROCEDURE — 87040 BLOOD CULTURE FOR BACTERIA: CPT | Performed by: PHYSICIAN ASSISTANT

## 2018-04-10 PROCEDURE — 40000133 ZZH STATISTIC OT WARD VISIT: Performed by: OCCUPATIONAL THERAPY ASSISTANT

## 2018-04-10 PROCEDURE — 80048 BASIC METABOLIC PNL TOTAL CA: CPT | Performed by: PSYCHIATRY & NEUROLOGY

## 2018-04-10 RX ORDER — SODIUM CHLORIDE 450 MG/100ML
INJECTION, SOLUTION INTRAVENOUS CONTINUOUS
Status: DISCONTINUED | OUTPATIENT
Start: 2018-04-10 | End: 2018-04-11

## 2018-04-10 RX ORDER — LEVETIRACETAM 500 MG/1
500 TABLET ORAL 2 TIMES DAILY
Status: DISCONTINUED | OUTPATIENT
Start: 2018-04-10 | End: 2018-04-10 | Stop reason: ALTCHOICE

## 2018-04-10 RX ORDER — LEVETIRACETAM 5 MG/ML
500 INJECTION INTRAVASCULAR EVERY 12 HOURS
Status: DISCONTINUED | OUTPATIENT
Start: 2018-04-10 | End: 2018-04-11

## 2018-04-10 RX ADMIN — LEVETIRACETAM 500 MG: 500 INJECTION, SOLUTION INTRAVENOUS at 10:07

## 2018-04-10 RX ADMIN — DOCUSATE SODIUM 100 MG: 100 CAPSULE, LIQUID FILLED ORAL at 20:20

## 2018-04-10 RX ADMIN — THIAMINE HYDROCHLORIDE 500 MG: 100 INJECTION, SOLUTION INTRAMUSCULAR; INTRAVENOUS at 00:54

## 2018-04-10 RX ADMIN — CARVEDILOL 3.12 MG: 3.12 TABLET, FILM COATED ORAL at 10:06

## 2018-04-10 RX ADMIN — SODIUM CHLORIDE: 4.5 INJECTION, SOLUTION INTRAVENOUS at 09:18

## 2018-04-10 RX ADMIN — CARVEDILOL 3.12 MG: 3.12 TABLET, FILM COATED ORAL at 20:20

## 2018-04-10 RX ADMIN — DOCUSATE SODIUM 100 MG: 100 CAPSULE, LIQUID FILLED ORAL at 10:06

## 2018-04-10 RX ADMIN — THIAMINE HYDROCHLORIDE 500 MG: 100 INJECTION, SOLUTION INTRAMUSCULAR; INTRAVENOUS at 09:17

## 2018-04-10 RX ADMIN — TORSEMIDE 20 MG: 20 TABLET ORAL at 10:06

## 2018-04-10 RX ADMIN — THIAMINE HYDROCHLORIDE 500 MG: 100 INJECTION, SOLUTION INTRAMUSCULAR; INTRAVENOUS at 16:31

## 2018-04-10 RX ADMIN — LEVETIRACETAM 500 MG: 500 INJECTION, SOLUTION INTRAVENOUS at 20:20

## 2018-04-10 ASSESSMENT — ACTIVITIES OF DAILY LIVING (ADL)
ADLS_ACUITY_SCORE: 15
ADLS_ACUITY_SCORE: 13
ADLS_ACUITY_SCORE: 16
ADLS_ACUITY_SCORE: 16

## 2018-04-10 NOTE — PROVIDER NOTIFICATION
Brief update:    Paged re: possible UTI    50-100K CFU Klebsiella. Abx discontinued during day shift.    Does not meet diagnostic criteria for UTI, no re-initiation of antibiotics at this time.     Von Gomez MD  1:18 AM

## 2018-04-10 NOTE — PLAN OF CARE
Problem: Patient Care Overview  Goal: Plan of Care/Patient Progress Review  Outcome: No Change  Pt A&Ox4, VSS on RA. Denies pain, HA. Baseline numbness and tingling in extremities. CMS/Neuros intact. L BKA prothesis in use. Voiding without difficulty, voids standing at bedside. Dressing has dried drainage. LS clear. IV saline locked.

## 2018-04-10 NOTE — PROGRESS NOTES
[facility]    Neurosurgery Progress Note    Date of Service (when I saw the patient): 04/10/2018     Assessment & Plan   Antonio Ramirez is a 73 year old male who was admitted on 4/7/2018. He presented to ER with mental status changes. He was found to have a large right SDH with midline shift. He was taken to OR by Dr. Jono Issa.  He underwent right frontotemporal parietal craniotomy for evacuation of SDH on 4/7/2018. Post op CT with improvement of midline shift. Today, patient is lying in bed and states he only has some mild incisional site pain. Per RN wife stated that his speech seems to be more slurred over the past couple of days. He remains neuro intact and is following commands. Therapies recommending ARC at discharge.     Active Problems:    S/P craniotomy    Subdural hematoma (H)    Assessment: POD 3. Doing well. Mild incisional site pain.    Plan:   -Mobilize  -Continue to work with therapies; recommending ARC at DC  -Staple removal 4/21/18      I have discussed the following assessment and plan with Dr. Issa who is in agreement with initial plan and will follow up with further consultation recommendations.      Enedina Pfeiffer PA-C  [facility]  [place] [address]    Tel [phone]  Pager [phone]      Interval History   POD 3. Doing well. Mild incisional site pain.    Physical Exam   Temp: 97.8  F (36.6  C) Temp src: Oral BP: 131/76   Heart Rate: 52 Resp: 16 SpO2: 96 % O2 Device: None (Room air)    Vitals:    04/07/18 1730 04/09/18 0600 04/10/18 0607   Weight: 189 lb 13.1 oz (86.1 kg) 194 lb 10.7 oz (88.3 kg) 194 lb 9.6 oz (88.3 kg)     Vital Signs with Ranges  Temp:  [97.8  F (36.6  C)-98.6  F (37  C)] 97.8  F (36.6  C)  Heart Rate:  [51-68] 52  Resp:  [11-29] 16  BP: (116-151)/(64-84) 131/76  SpO2:  [96 %-100 %] 96 %  I/O last 3 completed shifts:  In: 600 [P.O.:300; I.V.:300]  Out: 3933  [Urine:1275]    Heart Rate: 52, Blood pressure 131/76, pulse 66, temperature 97.8  F (36.6  C), temperature source Oral, resp. rate 16, weight 194 lb 9.6 oz (88.3 kg), SpO2 96 %.  194 lbs 9.6 oz  HEENT:  Normocephalic.  PERRL.  EOM s intact.    Heart:  No peripheral edema  Lungs:  No SOB  Skin:  Warm and dry. Incision covered with dressing with staples intact.   Extremities:  No edema, cyanosis or clubbing.    NEUROLOGICAL EXAMINATION:   Mental status:  Alert and Oriented x 3, speech is fluent.  Cranial nerves:  II-XII intact.   Motor:  Strength is 5/5 throughout. LLE BKA.  Sensation:  intact  Coordination:  Smooth finger to nose testing. Negative pronator drift.       Medications     NaCl 50 mL/hr at 04/10/18 0918     - MEDICATION INSTRUCTIONS -         levETIRAcetam  500 mg Intravenous Q12H     torsemide  20 mg Oral Daily     carvedilol  3.125 mg Oral BID     thiamine  500 mg Intravenous Q8H     docusate sodium  100 mg Oral BID       Data   CBC RESULTS:   Recent Labs   Lab Test  04/10/18   0815   WBC  6.2   RBC  3.86*   HGB  9.7*   HCT  31.1*   MCV  81   MCH  25.1*   MCHC  31.2*   RDW  19.2*   PLT  131*     Basic Metabolic Panel:  Lab Results   Component Value Date     04/10/2018      Lab Results   Component Value Date    POTASSIUM 3.4 04/10/2018     Lab Results   Component Value Date    CHLORIDE 111 04/10/2018     Lab Results   Component Value Date    BENNIE 8.8 04/10/2018     Lab Results   Component Value Date    CO2 22 04/10/2018     Lab Results   Component Value Date    BUN 15 04/10/2018     Lab Results   Component Value Date    CR 0.93 04/10/2018     Lab Results   Component Value Date    GLC 89 04/10/2018     INR:  Lab Results   Component Value Date    INR 1.58 04/10/2018    INR 1.37 04/08/2018    INR 1.96 04/07/2018    INR 3.61 01/28/2018    INR 2.67 12/15/2017    INR 1.39 12/10/2017    INR 1.16 12/09/2017    INR 1.25 12/08/2017    INR 1.65 12/04/2017    INR 1.65 12/01/2017    INR 1.91 12/01/2017    INR  2.56 11/30/2017

## 2018-04-10 NOTE — PLAN OF CARE
Problem: Patient Care Overview  Goal: Plan of Care/Patient Progress Review  Outcome: Therapy, progress toward functional goals as expected  Discharge Planner SLP   Patient plan for discharge: not stated  Current status: Patient up in chair and spouse present. Note impulsivity with feeding self unless provided moderate cues. Note mild phlegmy/rough/gravelly vocal quality on intermittent basis (but difficult to discern if change in vocal quality from baseline). No coughing or throat clearing occurred. Note slow but functional oral phase with puree and thickened liquids but no oral residue. Note oral holding with thin water. Did not assess semi-solid textures this date due to performance with puree and thin liquids. Patient needed max cues for double swallow strategy with poor follow-through. Recommend continue dysphagia diet level 1 with nectar thick liquids given 1:1 supervision and swallow strategies (fully upright position, drink from spoon, double swallow, small single bites/sips, alternate liquids/solids). Please crush pills and serve with applesauce. Monitor for signs/symptoms of aspiration and hold PO if occur.  Barriers to return to prior living situation: Deconditioning and cognition.   Recommendations for discharge: ARC  Rationale for recommendations: Continue ST at [facility] for swallowing and complete a cognitive linguistic evaluation       Entered by: Deborah Campbell 04/10/2018 11:25 AM

## 2018-04-10 NOTE — PROGRESS NOTES
04/10/18 0900   Quick Adds   Type of Visit Initial PT Evaluation   Living Environment   Lives With spouse   Living Arrangements apartment   Transportation Available car;family or friend will provide   Self-Care   Dominant Hand ambidextrous   Usual Activity Tolerance good   Current Activity Tolerance fair   Equipment Currently Used at Home grab bar;cane, straight;walker, rolling   Functional Level Prior   Ambulation 1-->assistive equipment   Transferring 0-->independent   Toileting 1-->assistive equipment   Bathing 1-->assistive equipment   Dressing 0-->independent   Eating 0-->independent   Communication 2-->difficulty speaking (not related to language barrier)   Swallowing 2-->difficulty swallowing liquids   Cognition 1 - attention or memory deficits   Fall history within last six months yes   Number of times patient has fallen within last six months 2   Which of the above functional risks had a recent onset or change? ambulation   Prior Functional Level Comment Pts wife assisted with PLOF at times. Before intialy fall pt was using SEC occasinally after fall and 2nd fall getting weaker and using ww most of the time. PT reports was driving, managging own meds prior to first fall.    General Information   Onset of Illness/Injury or Date of Surgery - Date 04/07/18   Referring Physician Peggy Hogue   Patient/Family Goals Statement none stated, asking what would happen if he checked himself out of this place to RN   Pertinent History of Current Problem (include personal factors and/or comorbidities that impact the POC) Admitted after fall for SDH, now s/p evacuation POD # 3   Precautions/Limitations fall precautions   Weight-Bearing Status - LUE full weight-bearing   Weight-Bearing Status - RUE full weight-bearing   Weight-Bearing Status - LLE full weight-bearing  (BKA with prosthetic)   Weight-Bearing Status - RLE full weight-bearing   General Observations diffculty talking, prosthetic in room   Cognitive Status  "Examination   Orientation person  (pt dit not know surgery date, or why surgery done)   Level of Consciousness alert   Follows Commands and Answers Questions able to follow single-step instructions   Personal Safety and Judgment intact   Memory impaired   Pain Assessment   Patient Currently in Pain Yes, see Vital Sign flowsheet  (2/10)   Integumentary/Edema   Integumentary/Edema Comments incision R side of head   Strength   Strength Comments B LE general deconditioning, able to sit to stand with CGA but limited mm endurance for functional act   Bed Mobility   Bed Mobility Comments bed mob with min A and A for set up   Transfer Skills   Transfer Comments Sit to stand with CGA and FWW with prosthetic on L LE   Gait   Gait Comments amb with FWW and CGA 13'   Balance   Balance Comments decreased standing dynamic balance with L prostheic, requires B UE support   Coordination   Coordination no deficits were identified   Muscle Tone   Muscle Tone no deficits were identified   General Therapy Interventions   Planned Therapy Interventions balance training;gait training;bed mobility training;strengthening;transfer training   Clinical Impression   Criteria for Skilled Therapeutic Intervention yes, treatment indicated   PT Diagnosis difficutly walking and transferring   Influenced by the following impairments decreased strength, balance, act dudley, cog deficits, speech deficits, pain   Functional limitations due to impairments impaired functional mob I, dudley and safety   Clinical Presentation Evolving/Changing   Clinical Presentation Rationale 3-5 deficits   Clinical Decision Making (Complexity) Moderate complexity   Therapy Frequency` 2 times/day   Predicted Duration of Therapy Intervention (days/wks) 3 days   Anticipated Discharge Disposition Acute Rehabilitation Facility   Risk & Benefits of therapy have been explained Yes   Patient, Family & other staff in agreement with plan of care Yes   [facility] AM-PAC TM \"6 " "Clicks\"   2016, Trustees of [facility], under license to [employer].  All rights reserved.   6 Clicks Short Forms Basic Mobility Inpatient Short Form   [facility] AM-PAC  \"6 Clicks\" V.2 Basic Mobility Inpatient Short Form   1. Turning from your back to your side while in a flat bed without using bedrails? 4 - None   2. Moving from lying on your back to sitting on the side of a flat bed without using bedrails? 3 - A Little   3. Moving to and from a bed to a chair (including a wheelchair)? 3 - A Little   4. Standing up from a chair using your arms (e.g., wheelchair, or bedside chair)? 3 - A Little   5. To walk in hospital room? 4 - None   6. Climbing 3-5 steps with a railing? 2 - A Lot   Basic Mobility Raw Score (Score out of 24.Lower scores equate to lower levels of function) 19   Total Evaluation Time   Total Evaluation Time (Minutes) 12     "

## 2018-04-10 NOTE — PROGRESS NOTES
[facility]    Hospitalist Progress Note    Assessment & Plan   Antonio Ramirez is a 73 year old male who was admitted on 4/7/2018.His past medical history is significant for alcoholism,amputation of left leg, anticardiolipin antibody syndrome, aortic stenosis, balance problems, cardiomyopathy, coronary artery disease,  hiatal hernia, hypercholesterolemia, essential hypertension, alcoholic liver disease and cirrhosis along with portal hypertension, low-grade B cell lymphoproliferative disorder, monoclonal gammopathy And pulmonary embolism in the past.  Patient presented to the hospital with subdural hematoma after sustaining a fall early morning on the day of admission,     Right subdural hematoma 2/2 fall,   - At the time of arrival he was essentially unresponsive.  Head CT showed a subdural hematoma on the right with leftward shift.  He was taken to the OR for evacuation and underwent right frontoparietal craniotomy and evacuation of subdural hematoma on 4/7/2018.   -Postoperative CT with improvement of midline shift  - was on chronic anticoagulation PTA which is currently on hold  - Neurosurgery and neurology following, appreciate input  - Seizure prophylaxis, keppra 500 mg BID IV, can likely change to oral  - PT/OT/ST following , appreciate recommendations  - Roxicodone PRN for pain management      Antiphospholipid antibody syndrome, IVC filter in place, on chronic anticoagulation.  History of pulmonary embolism, deep vein thrombosis:   - Holding PTA warfarin and ASA; of note, pharmacy to adjust warfarin dose per chromogenic factor 10 when appropriate to resume   - Received reversal agents on admission including prothrombin complex concentrates and vitamin K.  - SBP goal < 140, recently transitioned off nicardipine.  - Will defer anticoagulation to neurosurgery when he can be started back       Paroxysmal atrial fibrillation on chronic anticoagulation with noted slowed ventricular response: Pt  received atropine post operatively for bradycardia HR 30s.  Patient currently on Coreg which is his medication PTA and has been tolerating  - Holding PTA warfarin as noted above and awaiting clearance from neurosurgery to be able to start back  - Continuous telemetry ordered      Chronic anemia.  - Hemoglobin  baseline range 8.0-11  -Has been stable around 9-10.  No active signs of bleeding.  Monitor intermittently      History of alcohol use disorder.  Alcoholic cirrhosis with ascites, portal hypertension.  - Monitor for withdrawal symptoms.  - LFTs WNL except for mildly elevated total bili 2.2 on admission, will continue to monitor intermittently or earlier if symptoms arise      HFrEF, EF 40-45%.  Cardiomyopathy.  Mild valvular aortic stenosis, moderate mitral regurgitation, mild to moderate tricuspid regurgitation, mild ascending aorta dilatation.  Coronary artery disease s/p 3-v CABG.  Thoracic aortic aneurysm without rupture.  - BNP on admission 4334  - Continue PTA Coreg  - Holding PTA ASA, warfarin as above  - Not on statin PTA due to intolerance  -Continue PTA torsemide      Hyperlipidemia.  - Does not tolerate statin therapy, not on PTA medication for above diagnosis; will continue to monitor at this time.      Essential hypertension.  - Current SBP goal <140, recently transitioned off nicardipine infusion  - Continue PTA coreg and torsemide  Blood pressure reasonable, monitor and adjust medication as needed      Chronic kidney disease, stage 3.  - History of ANSELMO requiring HD in 2010  Renal function has been stable during his hospital stay, monitor intermittently, avoid nephrotoxins      Suspected urinary tract infection.  - U/A noted positive nitrites, large leukocyte esterase and >182 WBC  -Was on Ancef perioperatively.  Urine culture with 50,000- 100,000 CFU Klebsiella and patient currently asymptomatic, no leukocytosis  -Continue to monitor off antibiotics      Gastroesophageal reflux disease.  - Not  on any PTA medications for above diagnosis, will continue to monitor at this time.      Low grade B cell lymphoproliferative disorder.  History of prostate cancer s/p radiative seed implants.  - No acute issues, will continue to monitor.      Peripheral polyneuropathy.  - Holding PTA gabapentin at this time      Pain assessment :  Current Pain Score 4/10/2018   Patient currently in pain? denies   Pain score (0-10) -   Pain location -   Pain descriptors -   CPOT pain score -   Antonio mendez pain level was assessed and he currently denies pain.      DVT Prophylaxis: Pneumatic Compression Devices  Code Status: Full Code    Disposition: Expected discharge in 1-2 days once cleared by all consultants, remains clinically stable, placement found.    Keshia Black MD  Text page (7am - 6pm)    Interval History   Patient upset that he is unable to drink thin liquids and is on nectar thick liquid.  He denied any headache.  No new nursing concerns except patient is not drinking a lot of fluid given with new change in taste.    -Data reviewed today: I reviewed all new labs and imaging results over the last 24 hours. I personally reviewed no images or EKG's today.    Physical Exam   Temp: 98.2  F (36.8  C) Temp src: Oral BP: 126/64   Heart Rate: 60 Resp: 16 SpO2: 97 % O2 Device: None (Room air)    Vitals:    04/07/18 1730 04/09/18 0600 04/10/18 0607   Weight: 86.1 kg (189 lb 13.1 oz) 88.3 kg (194 lb 10.7 oz) 88.3 kg (194 lb 9.6 oz)     Vital Signs with Ranges  Temp:  [98  F (36.7  C)-98.6  F (37  C)] 98.2  F (36.8  C)  Heart Rate:  [51-72] 60  Resp:  [8-29] 16  BP: (114-151)/(64-84) 126/64  SpO2:  [96 %-100 %] 97 %  I/O last 3 completed shifts:  In: 600 [P.O.:300; I.V.:300]  Out: 1275 [Urine:1275]    Exam:  Constitutional: Awake, alert and no distress. Appears comfortable.  Dressing on right scalp  Head: Normocephalic. No masses, lesions, tenderness or abnormalities  ENT: no neck nodes or sinus tenderness  Cardiovascular: RRR.  No  murmurs, no rub or gallop no JVD  Respiratory: Normal WOB, no wheezes or crackles   Gastrointestinal: Abdomen soft, non-tender. BS normal. No masses, organomegaly  : Deferred  Extremities: Below knee amputation on left lower extremity, no edema on RLE, no clubbing /cyanosis   Skin: Warm and dry, no rash        Medications     - MEDICATION INSTRUCTIONS -         levETIRAcetam  500 mg Intravenous Q12H     torsemide  20 mg Oral Daily     carvedilol  3.125 mg Oral BID     thiamine  500 mg Intravenous Q8H     docusate sodium  100 mg Oral BID       Data     Recent Labs  Lab 04/08/18  0547 04/07/18  1240   WBC 8.7 5.2   HGB 10.3* 11.3*   MCV 81 81    150   INR 1.37* 1.96*    141   POTASSIUM 4.0 4.0   CHLORIDE 112* 109   CO2 21 24   BUN 15 17   CR 0.86 0.86   ANIONGAP 9 8   BENNIE 8.6 9.3   GLC 90 87   ALBUMIN  --  3.5   PROTTOTAL  --  7.7   BILITOTAL  --  2.2*   ALKPHOS  --  93   ALT  --  17   AST  --  30   TROPI  --  <0.015       Imaging:  No results found for this or any previous visit (from the past 24 hour(s)).

## 2018-04-10 NOTE — PROVIDER NOTIFICATION
"Paged Dr. Gomez \"Do we need antibiotics for UTI?\"    Dr. Gomez stated pt actually does not have UTI so no need for antibiotics.  "

## 2018-04-10 NOTE — PROGRESS NOTES
ARC admissions: Met with the patient this AM regarding therapy recommendations for ARC upon discharge. Educated the patient in benefits of ARC including intensive therapies, close medical management, and rehabilitative nursing care. The patient reports he is motivated to improve function to return home with family support. All questions answered including location of unit, parking, visiting hours, and goals of return to community. The patient reports he is in agreement with ARC upon discharge. Attempted to re-visit patient to discuss with his wife as well, however he was asleep and she was not present at that attempt. Care coordinator aware if the patient's wife has any questions I can be contacted by phone.     Thank you for the referral, we will continue to follow this patient for post acute placement.     Determination of admission is based upon the patient's need for an intensive, interdisciplinary approach to rehabilitation, their ability to progress, their ability to tolerate intensive therapies, their need for daily physician supervision, their need for twenty four hour nursing assistance, and their ability and willingness to participate in such a program.    Von Garza CM  Rehab Liaison/  [facility]  4/10/2018    2:12 PM

## 2018-04-10 NOTE — PLAN OF CARE
Problem: Patient Care Overview  Goal: Plan of Care/Patient Progress Review  Discharge Planner OT   Patient plan for discharge: none stated  Current status: Session shortened due to nursing cares, pt completed sit to stand Edvin, amb with FWW CGA to/from bathroom, stood at sink for ADLS CGA, pt with decrease RUE FMC/strength, assist needed to open cap on toothpaste as pt not able to complete.   Barriers to return to prior living situation: needs A with ADL and mobility, confusion  Recommendations for discharge: ARU per plan established by the Occupational   Rationale for recommendations: not at baseline       Entered by: Saba Wang 04/10/2018 10:27 AM

## 2018-04-10 NOTE — PLAN OF CARE
Problem: Patient Care Overview  Goal: Plan of Care/Patient Progress Review  Outcome: No Change  Pt alert and oriented x4. Forgetful at times. VSS. Neuros with slight L droop, slurred, thick speech, and baseline neuropathy in extremities. Neurosurgery PA notified re family concern that pt's has changed over the last two days. Per spouse, speech was clear on 4/8, and has gotten progressively more slurred over the last 2 days. Incision on L sided of head with scant sanguinous drainage, staples intact. Open to air. Lung sounds clear. Strength 5/5 in extremities. L BKA with prosthetic. Ambulating with asst of 1. Tolerating DD1 diet with nectar thick liquids, needs encouragement with po intake.

## 2018-04-11 ENCOUNTER — APPOINTMENT (OUTPATIENT)
Dept: OCCUPATIONAL THERAPY | Facility: CLINIC | Age: 73
DRG: 025 | End: 2018-04-11
Payer: MEDICARE

## 2018-04-11 ENCOUNTER — APPOINTMENT (OUTPATIENT)
Dept: PHYSICAL THERAPY | Facility: CLINIC | Age: 73
DRG: 025 | End: 2018-04-11
Payer: MEDICARE

## 2018-04-11 ENCOUNTER — APPOINTMENT (OUTPATIENT)
Dept: SPEECH THERAPY | Facility: CLINIC | Age: 73
DRG: 025 | End: 2018-04-11
Payer: MEDICARE

## 2018-04-11 LAB
ANION GAP SERPL CALCULATED.3IONS-SCNC: 10 MMOL/L (ref 3–14)
BUN SERPL-MCNC: 14 MG/DL (ref 7–30)
CALCIUM SERPL-MCNC: 8.8 MG/DL (ref 8.5–10.1)
CHLORIDE SERPL-SCNC: 107 MMOL/L (ref 94–109)
CO2 SERPL-SCNC: 23 MMOL/L (ref 20–32)
CREAT SERPL-MCNC: 0.88 MG/DL (ref 0.66–1.25)
ERYTHROCYTE [DISTWIDTH] IN BLOOD BY AUTOMATED COUNT: 19.1 % (ref 10–15)
GFR SERPL CREATININE-BSD FRML MDRD: 84 ML/MIN/1.7M2
GLUCOSE SERPL-MCNC: 113 MG/DL (ref 70–99)
HCT VFR BLD AUTO: 32.7 % (ref 40–53)
HGB BLD-MCNC: 10.5 G/DL (ref 13.3–17.7)
INR PPP: 1.39 (ref 0.86–1.14)
MCH RBC QN AUTO: 25.5 PG (ref 26.5–33)
MCHC RBC AUTO-ENTMCNC: 32.1 G/DL (ref 31.5–36.5)
MCV RBC AUTO: 80 FL (ref 78–100)
PLATELET # BLD AUTO: 156 10E9/L (ref 150–450)
POTASSIUM SERPL-SCNC: 2.9 MMOL/L (ref 3.4–5.3)
POTASSIUM SERPL-SCNC: 3.3 MMOL/L (ref 3.4–5.3)
RBC # BLD AUTO: 4.11 10E12/L (ref 4.4–5.9)
SODIUM SERPL-SCNC: 140 MMOL/L (ref 133–144)
WBC # BLD AUTO: 5.9 10E9/L (ref 4–11)

## 2018-04-11 PROCEDURE — 80048 BASIC METABOLIC PNL TOTAL CA: CPT | Performed by: PSYCHIATRY & NEUROLOGY

## 2018-04-11 PROCEDURE — 40000225 ZZH STATISTIC SLP WARD VISIT: Performed by: SPEECH-LANGUAGE PATHOLOGIST

## 2018-04-11 PROCEDURE — A9270 NON-COVERED ITEM OR SERVICE: HCPCS | Mod: GY | Performed by: INTERNAL MEDICINE

## 2018-04-11 PROCEDURE — 97535 SELF CARE MNGMENT TRAINING: CPT | Mod: GO | Performed by: OCCUPATIONAL THERAPY ASSISTANT

## 2018-04-11 PROCEDURE — 85610 PROTHROMBIN TIME: CPT | Performed by: PSYCHIATRY & NEUROLOGY

## 2018-04-11 PROCEDURE — 97110 THERAPEUTIC EXERCISES: CPT | Mod: GO | Performed by: OCCUPATIONAL THERAPY ASSISTANT

## 2018-04-11 PROCEDURE — 25000132 ZZH RX MED GY IP 250 OP 250 PS 637: Mod: GY | Performed by: NURSE PRACTITIONER

## 2018-04-11 PROCEDURE — 27210995 ZZH RX 272: Performed by: INTERNAL MEDICINE

## 2018-04-11 PROCEDURE — 92526 ORAL FUNCTION THERAPY: CPT | Mod: GN | Performed by: SPEECH-LANGUAGE PATHOLOGIST

## 2018-04-11 PROCEDURE — 97116 GAIT TRAINING THERAPY: CPT | Mod: GP

## 2018-04-11 PROCEDURE — 36415 COLL VENOUS BLD VENIPUNCTURE: CPT | Performed by: PSYCHIATRY & NEUROLOGY

## 2018-04-11 PROCEDURE — 97110 THERAPEUTIC EXERCISES: CPT | Mod: GP

## 2018-04-11 PROCEDURE — 40000133 ZZH STATISTIC OT WARD VISIT: Performed by: OCCUPATIONAL THERAPY ASSISTANT

## 2018-04-11 PROCEDURE — 25000128 H RX IP 250 OP 636: Performed by: PSYCHIATRY & NEUROLOGY

## 2018-04-11 PROCEDURE — 25000132 ZZH RX MED GY IP 250 OP 250 PS 637: Mod: GY | Performed by: INTERNAL MEDICINE

## 2018-04-11 PROCEDURE — 99232 SBSQ HOSP IP/OBS MODERATE 35: CPT | Performed by: INTERNAL MEDICINE

## 2018-04-11 PROCEDURE — A9270 NON-COVERED ITEM OR SERVICE: HCPCS | Mod: GY | Performed by: NURSE PRACTITIONER

## 2018-04-11 PROCEDURE — 25000128 H RX IP 250 OP 636: Performed by: INTERNAL MEDICINE

## 2018-04-11 PROCEDURE — 85027 COMPLETE CBC AUTOMATED: CPT | Performed by: PSYCHIATRY & NEUROLOGY

## 2018-04-11 PROCEDURE — 97530 THERAPEUTIC ACTIVITIES: CPT | Mod: GP

## 2018-04-11 PROCEDURE — 84132 ASSAY OF SERUM POTASSIUM: CPT | Performed by: INTERNAL MEDICINE

## 2018-04-11 PROCEDURE — 12000000 ZZH R&B MED SURG/OB

## 2018-04-11 PROCEDURE — 36415 COLL VENOUS BLD VENIPUNCTURE: CPT | Performed by: INTERNAL MEDICINE

## 2018-04-11 PROCEDURE — 40000193 ZZH STATISTIC PT WARD VISIT

## 2018-04-11 RX ORDER — LEVETIRACETAM 500 MG/1
500 TABLET ORAL 2 TIMES DAILY
Status: DISCONTINUED | OUTPATIENT
Start: 2018-04-11 | End: 2018-04-12 | Stop reason: HOSPADM

## 2018-04-11 RX ORDER — CARVEDILOL 6.25 MG/1
6.25 TABLET ORAL 2 TIMES DAILY
Status: DISCONTINUED | OUTPATIENT
Start: 2018-04-11 | End: 2018-04-12 | Stop reason: HOSPADM

## 2018-04-11 RX ADMIN — POTASSIUM CHLORIDE 20 MEQ: 1500 TABLET, EXTENDED RELEASE ORAL at 22:14

## 2018-04-11 RX ADMIN — TORSEMIDE 20 MG: 20 TABLET ORAL at 09:01

## 2018-04-11 RX ADMIN — DOCUSATE SODIUM 100 MG: 100 CAPSULE, LIQUID FILLED ORAL at 09:02

## 2018-04-11 RX ADMIN — LEVETIRACETAM 500 MG: 500 INJECTION, SOLUTION INTRAVENOUS at 09:44

## 2018-04-11 RX ADMIN — CARVEDILOL 3.12 MG: 3.12 TABLET, FILM COATED ORAL at 09:01

## 2018-04-11 RX ADMIN — POTASSIUM CHLORIDE 40 MEQ: 1500 TABLET, EXTENDED RELEASE ORAL at 19:58

## 2018-04-11 RX ADMIN — LEVETIRACETAM 500 MG: 500 TABLET, FILM COATED ORAL at 19:58

## 2018-04-11 RX ADMIN — HYDRALAZINE HYDROCHLORIDE 10 MG: 20 INJECTION INTRAMUSCULAR; INTRAVENOUS at 04:09

## 2018-04-11 RX ADMIN — THIAMINE HYDROCHLORIDE 500 MG: 100 INJECTION, SOLUTION INTRAMUSCULAR; INTRAVENOUS at 09:01

## 2018-04-11 RX ADMIN — THIAMINE HYDROCHLORIDE 500 MG: 100 INJECTION, SOLUTION INTRAMUSCULAR; INTRAVENOUS at 16:29

## 2018-04-11 RX ADMIN — THIAMINE HYDROCHLORIDE 500 MG: 100 INJECTION, SOLUTION INTRAMUSCULAR; INTRAVENOUS at 00:40

## 2018-04-11 RX ADMIN — POTASSIUM CHLORIDE 40 MEQ: 1500 TABLET, EXTENDED RELEASE ORAL at 14:13

## 2018-04-11 RX ADMIN — CARVEDILOL 6.25 MG: 6.25 TABLET, FILM COATED ORAL at 19:59

## 2018-04-11 RX ADMIN — SODIUM CHLORIDE: 4.5 INJECTION, SOLUTION INTRAVENOUS at 04:40

## 2018-04-11 RX ADMIN — POTASSIUM CHLORIDE 40 MEQ: 1500 TABLET, EXTENDED RELEASE ORAL at 11:14

## 2018-04-11 ASSESSMENT — ACTIVITIES OF DAILY LIVING (ADL)
ADLS_ACUITY_SCORE: 16
ADLS_ACUITY_SCORE: 15
ADLS_ACUITY_SCORE: 16

## 2018-04-11 NOTE — PROGRESS NOTES
CM    I:  SW received VM from [facility] liaison stating they will anticipate taking patient tomorrow, 4/12/18.     P:  Will continue to follow as needed.    TREMAYNE Auguste    UPDATE@4383:  SW received call from [facility] liaison asking for 11 AM transport tomorrow.  SW met with patient who asked SW to speak with his family.  SW left VM for spouse who is the only emergency contact listed for patient. Asked for call back to discuss transportation.     UPDATE@1600: Spouse has confirmed she can transport patient tomorrow at 11am.  Spouse stated she will not plan to come up to patient's room, but would pick him up at Entrance 6 at that time tomorrow.

## 2018-04-11 NOTE — PROVIDER NOTIFICATION
[facility] called with a critical lab, gram positive cocci in clusters drawn from the left arm on 4/7. Page sent to hospitalist, awaiting callback.

## 2018-04-11 NOTE — PROGRESS NOTES
[facility]    Hospitalist Progress Note    Assessment & Plan   Antonio Ramirez is a 73 year old male who was admitted on 4/7/2018.His past medical history is significant for alcoholism,amputation of left leg, anticardiolipin antibody syndrome, aortic stenosis, balance problems, cardiomyopathy, coronary artery disease,  hiatal hernia, hypercholesterolemia, essential hypertension, alcoholic liver disease and cirrhosis along with portal hypertension, low-grade B cell lymphoproliferative disorder, monoclonal gammopathy And pulmonary embolism in the past.  Patient presented to the hospital with subdural hematoma after sustaining a fall early morning on the day of admission,     Right subdural hematoma 2/2 fall,   - At the time of arrival he was essentially unresponsive.  Head CT showed a subdural hematoma on the right with leftward shift.  He was taken to the OR for evacuation and underwent right frontoparietal craniotomy and evacuation of subdural hematoma on 4/7/2018.   -Postoperative CT with improvement of midline shift  - was on chronic anticoagulation for history of DVT and PE with history of antiphospholipid antibody syndromePTA, which is currently on hold  - Neurosurgery and neurology following, appreciate input  - Seizure prophylaxis, keppra 500 mg BID IV, will change to oral in anticipation of discharging soon  - PT/OT/ST following , appreciate recommendations  - Roxicodone PRN for pain management      Positive blood culture :  -His blood culture from admission was positive 1 out of 2 for coagulase-negative staph, likely contaminant.  Patient afebrile and no obvious focus of infection, improving   -Repeat culture sent, monitor off antibiotics    antiphospholipid antibody syndrome, IVC filter in place, on chronic anticoagulation.  History of pulmonary embolism, deep vein thrombosis:   - Holding PTA warfarin and ASA; of note, pharmacy to adjust warfarin dose per chromogenic factor 10 when  appropriate to resume   -Discussed with neurosurgery for recommendation regarding timing for resumption of anticoagulation, awaiting further recommendation  - Received reversal agents on admission including prothrombin complex concentrates and vitamin K.      Paroxysmal atrial fibrillation on chronic anticoagulation with noted slowed ventricular response: Pt received atropine post operatively for bradycardia HR 30s.  Patient currently on Coreg which is his medication PTA and has been tolerating  - Holding PTA warfarin as noted above and awaiting clearance from neurosurgery to be able to start back  - Continuous telemetry ordered      Chronic anemia.  - Hemoglobin  baseline range 8.0-11  -Has been stable around 9-10.  No active signs of bleeding.  Monitor intermittently      History of alcohol use disorder.  Alcoholic cirrhosis with ascites, portal hypertension.  - Monitor for withdrawal symptoms.  - LFTs WNL except for mildly elevated total bili 2.2 on admission, will continue to monitor intermittently or earlier if symptoms arise      HFrEF, EF 40-45%.  Cardiomyopathy.  Mild valvular aortic stenosis, moderate mitral regurgitation, mild to moderate tricuspid regurgitation, mild ascending aorta dilatation.  Coronary artery disease s/p 3-v CABG.  Thoracic aortic aneurysm without rupture.  - BNP on admission 4334  - Continue PTA Coreg  - Holding PTA ASA, warfarin as above  - Not on statin PTA due to intolerance  -Continue PTA torsemide      Hyperlipidemia.  - Does not tolerate statin therapy, not on PTA medication for above diagnosis; will continue to monitor at this time.      Essential hypertension.  - Current SBP goal <140,   -Patient had to be given IV medication overnight for slightly elevated blood pressure, we will increase Coreg to 6.25(from his PTA dose of 3.125)  - Continue  torsemide  -monitor and adjust medication as needed      Chronic kidney disease, stage 3.  - History of ANSELMO requiring HD in 2010  Renal  function has been stable during his hospital stay, monitor intermittently, avoid nephrotoxins      Suspected urinary tract infection.  - U/A noted positive nitrites, large leukocyte esterase and >182 WBC  -Was on Ancef perioperatively.  Urine culture with 50,000- 100,000 CFU Klebsiella and patient currently asymptomatic, no leukocytosis  -Continue to monitor off antibiotics      Gastroesophageal reflux disease.  - Not on any PTA medications for above diagnosis, will continue to monitor at this time.      Low grade B cell lymphoproliferative disorder.  History of prostate cancer s/p radiative seed implants.  - No acute issues, will continue to monitor.      Peripheral polyneuropathy.  - Holding PTA gabapentin at this time      Pain assessment :  Current Pain Score 4/11/2018   Patient currently in pain? denies   Pain score (0-10) -   Pain location -   Pain descriptors -   CPOT pain score -   Antonio mendez pain level was assessed and he currently denies pain.      DVT Prophylaxis: Pneumatic Compression Devices  Code Status: Full Code    Disposition: Expected discharge in 1-2 days once cleared by all consultants, remains clinically stable, placement found.    Keshia Black MD  Text page (7am - 6pm)    Interval History   Patient and family wanting to advance diet to thin liquid as nectar thick liquid is unpalatable.  Discussed with patient and family the reasoning for holding off on thin liquids, in order to prevent from getting aspiration pneumonia.  No new nursing concerns.    -Data reviewed today: I reviewed all new labs and imaging results over the last 24 hours. I personally reviewed no images or EKG's today.    Physical Exam   Temp: 98.5  F (36.9  C) Temp src: Oral BP: 131/73   Heart Rate: 69 Resp: 16 SpO2: 96 % O2 Device: None (Room air)    Vitals:    04/09/18 0600 04/10/18 0607 04/11/18 0028   Weight: 88.3 kg (194 lb 10.7 oz) 88.3 kg (194 lb 9.6 oz) 85.6 kg (188 lb 12.8 oz)     Vital Signs with Ranges  Temp:  [98.2  F  (36.8  C)-99.3  F (37.4  C)] 98.5  F (36.9  C)  Heart Rate:  [59-69] 69  Resp:  [15-16] 16  BP: (112-148)/(68-80) 131/73  SpO2:  [95 %-96 %] 96 %  I/O last 3 completed shifts:  In: 1212 [P.O.:85; I.V.:1127]  Out: 1700 [Urine:1700]    Exam:  Constitutional: Awake, alert and no distress. Appears comfortable.  Dressing on right scalp  Head: Normocephalic. No masses, lesions, tenderness or abnormalities  ENT: no neck nodes or sinus tenderness  Cardiovascular: RRR.  No murmurs, no rub or gallop no JVD  Respiratory: Normal WOB, no wheezes or crackles   Gastrointestinal: Abdomen soft, non-tender. BS normal. No masses, organomegaly  : Deferred  Extremities: Below knee amputation on left lower extremity, no edema on RLE, no clubbing /cyanosis   Skin: Warm and dry, no rash        Medications     NaCl 50 mL/hr at 04/11/18 0440     - MEDICATION INSTRUCTIONS -         levETIRAcetam  500 mg Intravenous Q12H     torsemide  20 mg Oral Daily     carvedilol  3.125 mg Oral BID     thiamine  500 mg Intravenous Q8H     docusate sodium  100 mg Oral BID       Data     Recent Labs  Lab 04/11/18  0807 04/10/18  0815 04/08/18  0547 04/07/18  1240   WBC 5.9 6.2 8.7 5.2   HGB 10.5* 9.7* 10.3* 11.3*   MCV 80 81 81 81    131* 152 150   INR 1.39* 1.58* 1.37* 1.96*    143 142 141   POTASSIUM 2.9* 3.4 4.0 4.0   CHLORIDE 107 111* 112* 109   CO2 23 22 21 24   BUN 14 15 15 17   CR 0.88 0.93 0.86 0.86   ANIONGAP 10 10 9 8   BENNIE 8.8 8.8 8.6 9.3   * 89 90 87   ALBUMIN  --   --   --  3.5   PROTTOTAL  --   --   --  7.7   BILITOTAL  --   --   --  2.2*   ALKPHOS  --   --   --  93   ALT  --   --   --  17   AST  --   --   --  30   TROPI  --   --   --  <0.015       Imaging:  No results found for this or any previous visit (from the past 24 hour(s)).

## 2018-04-11 NOTE — PLAN OF CARE
Problem: Patient Care Overview  Goal: Plan of Care/Patient Progress Review  Outcome: Improving  Pt alert and oriented x4. VSS, SBP < 140. Tele afib with CVR. Minimally slurred speech - much improved since yesterday, slight R facial droop. Neuros otherwise intact. CMS with baseline numbness in hands and foot. Lung sounds clear. Positive BS, had BM x2. Voiding without difficulty. Incision on R side of head WDL with intact staples. Diet advanced to DD2 with clear liquids. Good po intake. Ambulating with asst of 1 and walker. Plan to d/c to ARU, pending approval and availability.

## 2018-04-11 NOTE — PLAN OF CARE
Problem: Patient Care Overview  Goal: Plan of Care/Patient Progress Review  Discharge Planner PT   Patient plan for discharge: Rehab  Current status: Pt requires SBA for bed mobility and transfers with a FWW and LLE prosthesis donned. Gait training with FWW and L prosthesis with SBA along straight paths but requires Pepper for balance with turns. Pt tends to look down as he walks to see where his L foot has landed, at times hitting his foot on the walker legs.  Barriers to return to prior living situation: level of assist required, fall risk  Recommendations for discharge: ARU  Rationale for recommendations: Pt would benefit from intensive PT at ARU to progress mobility and balance so he can return home.       Entered by: Zari De Leon 04/11/2018 9:24 AM

## 2018-04-11 NOTE — PROGRESS NOTES
[facility]    Neurosurgery Progress Note    Date of Service (when I saw the patient): 04/11/2018     Assessment & Plan   Antonio Ramirez is a 73 year old male who was admitted on 4/7/2018. He presented to ER with mental status changes. He was found to have a large right SDH with midline shift. He was taken to OR by Dr. Jono Issa.  He underwent right frontotemporal parietal craniotomy for evacuation of SDH on 4/7/2018. Post op CT with improvement of midline shift.  He is sitting upright in bed.  He denies pain.  He is awake and alert.  He has had noted minimal slurred speech and working with speech therapist.  Plan likely for DC to ARU 4/12/18.  Patient previously anticoagulated for h/o DVT, PE with history of antiphospholipid antibody syndrome PTA.  Per Dr. Isas recommend no anticoagluation for the next month until repeat CT of the head unless absolutely necessary.  If necessary then recommend first, IVC filter, and if not IVC filter not an option then  recommend starting Lovenox without bolus 2 weeks postoperatively.    Active Problems:    S/P craniotomy    Assessment: post crani    Plan:   -Staple removal on 4- (may be done at ARU)  -Repeat CT of head and f[facility] 4 weeks  -Likely DC to ARU 4/12/18  -Recommend holding anticoagulants x4 weeks until repeat head CT completed; if not an option then recommend IVC filter and if that is not an option second recommendation is starting Lovenox with NO bolus 2 weeks post operatively.           Subdural hematoma (H)    Assessment: post crani    Plan: as above       I have discussed the following assessment and plan with Dr. Issa who is in agreement with initial plan and will follow up with further consultation recommendations.    [facility]  [facility]  [address]  [address] [place] [id number]    Tel [phone]  Pager [phone]      Interval History    Stable    Physical Exam   Temp: 98.5  F (36.9  C) Temp src: Oral BP: 131/73   Heart Rate: 69 Resp: 16 SpO2: 96 % O2 Device: None (Room air)    Vitals:    04/09/18 0600 04/10/18 0607 04/11/18 0028   Weight: 194 lb 10.7 oz (88.3 kg) 194 lb 9.6 oz (88.3 kg) 188 lb 12.8 oz (85.6 kg)     Vital Signs with Ranges  Temp:  [97.8  F (36.6  C)-99.3  F (37.4  C)] 98.5  F (36.9  C)  Heart Rate:  [52-69] 69  Resp:  [15-16] 16  BP: (112-148)/(68-80) 131/73  SpO2:  [95 %-96 %] 96 %  I/O last 3 completed shifts:  In: 1212 [P.O.:85; I.V.:1127]  Out: 1700 [Urine:1700]    Heart Rate: 69, Blood pressure 131/73, pulse 66, temperature 98.5  F (36.9  C), temperature source Oral, resp. rate 16, weight 188 lb 12.8 oz (85.6 kg), SpO2 96 %.  188 lbs 12.8 oz  HEENT:  Normocephalic, atraumatic.  PERRLA.  EOM s intact.    Heart:  No peripheral edema  Lungs:  No SOB  Skin:  Warm and dry, good capillary refill.  Incision intact, clean and dry.  Staples intact.  Extremities:  Good radial and dorsalis pedis pulses bilaterally, no edema, cyanosis or clubbing.    NEUROLOGICAL EXAMINATION:     Mental status:  Alert and Oriented x 3, speech is fluent.  Cranial nerves:  II-XII intact.   Motor:  Strength is 5/5 throughout the upper extremities  Strength is 5/5 to RLE.  He has LLE BKA  Sensation: Intact to light touch  Coordination:  Smooth finger to nose testing.   Negative pronator drift.    Gait:  Deferred; sitting up in bed.    Medications     NaCl 50 mL/hr at 04/11/18 0440     - MEDICATION INSTRUCTIONS -         levETIRAcetam  500 mg Intravenous Q12H     torsemide  20 mg Oral Daily     carvedilol  3.125 mg Oral BID     thiamine  500 mg Intravenous Q8H     docusate sodium  100 mg Oral BID       Data     All new lab and imaging data was personally reviewed by me.  CBC RESULTS:   Recent Labs   Lab Test  04/11/18   0807   WBC  5.9   RBC  4.11*   HGB  10.5*   HCT  32.7*   MCV  80   MCH  25.5*   MCHC  32.1   RDW  19.1*   PLT  156     Basic Metabolic  Panel:  Lab Results   Component Value Date     04/11/2018      Lab Results   Component Value Date    POTASSIUM 2.9 04/11/2018     Lab Results   Component Value Date    CHLORIDE 107 04/11/2018     Lab Results   Component Value Date    BENNIE 8.8 04/11/2018     Lab Results   Component Value Date    CO2 23 04/11/2018     Lab Results   Component Value Date    BUN 14 04/11/2018     Lab Results   Component Value Date    CR 0.88 04/11/2018     Lab Results   Component Value Date     04/11/2018     INR:  Lab Results   Component Value Date    INR 1.39 04/11/2018    INR 1.58 04/10/2018    INR 1.37 04/08/2018    INR 1.96 04/07/2018    INR 3.61 01/28/2018    INR 2.67 12/15/2017    INR 1.39 12/10/2017    INR 1.16 12/09/2017    INR 1.25 12/08/2017    INR 1.65 12/04/2017    INR 1.65 12/01/2017    INR 1.91 12/01/2017

## 2018-04-11 NOTE — PROGRESS NOTES
Paged by RN regarding 1/2 BC from 4/7 with gram positive cocci in clusters. Chart reviewed. Likely contaminant given WBC WBL and pt is afebrile. Redraw BC and follow.

## 2018-04-11 NOTE — PLAN OF CARE
Problem: Patient Care Overview  Goal: Plan of Care/Patient Progress Review  Discharge Planner SLP   Patient plan for discharge: Patient did not state.   Current status: Patient seen for swallow treatment bedside. Per his nurse does not like the current diet. Patient given trials of water via the cup. He continues to have premature entry and mild delay. Tolerated 5/6 swallows via the cup. Sx of penetration/aspiration after a solid and with larger bolus size. Tolerated trials of semi-soft textures but increased Sx of retention/penetration with solids. Continue to monitor thin liquids closely and if increased coughing change back to nectar . Recommend: 1. Advance diet to a Dysphagia Diet level 2 with thin liquids. 2. Upright, no straws, small sips or by spoon, double swallow with solids and alternate liquids/solids. 3. SLP will f/u for diet tolerance and swallow strategies.   Barriers to return to prior living situation: Cognition/mobility.  Recommendations for discharge: ARC  Rationale for recommendations: Continue ST at [facility] for diet tolerance and advancement. Complete a cognitive linguistic evaluation.        Entered by: Niki Benavidez 04/11/2018 1:34 PM

## 2018-04-11 NOTE — PLAN OF CARE
Problem: Patient Care Overview  Goal: Plan of Care/Patient Progress Review  Discharge Planner OT   Patient plan for discharge: none stated  Current status: Pt completed supine to sit EOB SBA, SBA with extra time and difficulty with grasping with donning of prosthesis however pt did not want any assist, pt min/mod A sit to stand, amb with FWW SANDEEP to/from bathroom, stood at sink for ADLS CGA for balance, assist needed to open cap from toothpaste. Educated on HEP for FMC/strengthening. Pt tolerated well.   Barriers to return to prior living situation: needs A with ADL and mobility, confusion  Recommendations for discharge: ARU per plan established by the Occupational Therapist  Rationale for recommendations: not at baseline would benefit from daily therapist to return to PLOF       Entered by: Saba Wang 04/11/2018 11:17 AM

## 2018-04-11 NOTE — PLAN OF CARE
Problem: Patient Care Overview  Goal: Plan of Care/Patient Progress Review  Outcome: No Change  1450-4533 Patient A/Ox4. Bradycardic at times, Max T 99.3 , SBP went >140 gave PRN hydralazine x 1, recheck back WNL. Neuro's; slurred speech, L droop. Baseline numbness in extremities.Right head incision, staples intact, no drainage THOMAS. Tele: A-fib CVR. BM x 1 on shift. Voiding adequately in urinal. DD1 nectar thick, poor appetite, poor oral intake. Up 1 + gait belt + walker + prothesis. Plan ARU 1-2 days.

## 2018-04-12 ENCOUNTER — APPOINTMENT (OUTPATIENT)
Dept: OCCUPATIONAL THERAPY | Facility: CLINIC | Age: 73
DRG: 025 | End: 2018-04-12
Payer: MEDICARE

## 2018-04-12 ENCOUNTER — HOSPITAL ENCOUNTER (INPATIENT)
Facility: CLINIC | Age: 73
LOS: 8 days | Discharge: HOME | DRG: 949 | End: 2018-04-20
Attending: PHYSICAL MEDICINE & REHABILITATION | Admitting: PHYSICAL MEDICINE & REHABILITATION
Payer: MEDICARE

## 2018-04-12 ENCOUNTER — PATIENT OUTREACH (OUTPATIENT)
Dept: FAMILY MEDICINE | Facility: CLINIC | Age: 73
End: 2018-04-12

## 2018-04-12 VITALS
OXYGEN SATURATION: 95 % | SYSTOLIC BLOOD PRESSURE: 128 MMHG | RESPIRATION RATE: 16 BRPM | BODY MASS INDEX: 24.93 KG/M2 | TEMPERATURE: 97.9 F | HEART RATE: 52 BPM | WEIGHT: 183.8 LBS | DIASTOLIC BLOOD PRESSURE: 73 MMHG

## 2018-04-12 DIAGNOSIS — I10 ESSENTIAL HYPERTENSION: Primary | ICD-10-CM

## 2018-04-12 DIAGNOSIS — I25.119 CORONARY ARTERY DISEASE INVOLVING NATIVE HEART WITH ANGINA PECTORIS, UNSPECIFIED VESSEL OR LESION TYPE (H): ICD-10-CM

## 2018-04-12 DIAGNOSIS — I62.00 SUBDURAL HEMORRHAGE (H): ICD-10-CM

## 2018-04-12 LAB
ANION GAP SERPL CALCULATED.3IONS-SCNC: 8 MMOL/L (ref 3–14)
BUN SERPL-MCNC: 16 MG/DL (ref 7–30)
CALCIUM SERPL-MCNC: 8.7 MG/DL (ref 8.5–10.1)
CHLORIDE SERPL-SCNC: 107 MMOL/L (ref 94–109)
CO2 SERPL-SCNC: 24 MMOL/L (ref 20–32)
CREAT SERPL-MCNC: 0.9 MG/DL (ref 0.66–1.25)
ERYTHROCYTE [DISTWIDTH] IN BLOOD BY AUTOMATED COUNT: 19.3 % (ref 10–15)
GFR SERPL CREATININE-BSD FRML MDRD: 83 ML/MIN/1.7M2
GLUCOSE SERPL-MCNC: 87 MG/DL (ref 70–99)
HCT VFR BLD AUTO: 32.1 % (ref 40–53)
HGB BLD-MCNC: 10.3 G/DL (ref 13.3–17.7)
MCH RBC QN AUTO: 25.6 PG (ref 26.5–33)
MCHC RBC AUTO-ENTMCNC: 32.1 G/DL (ref 31.5–36.5)
MCV RBC AUTO: 80 FL (ref 78–100)
PLATELET # BLD AUTO: 137 10E9/L (ref 150–450)
POTASSIUM SERPL-SCNC: 3.8 MMOL/L (ref 3.4–5.3)
POTASSIUM SERPL-SCNC: 3.9 MMOL/L (ref 3.4–5.3)
RBC # BLD AUTO: 4.03 10E12/L (ref 4.4–5.9)
SODIUM SERPL-SCNC: 139 MMOL/L (ref 133–144)
WBC # BLD AUTO: 5.5 10E9/L (ref 4–11)

## 2018-04-12 PROCEDURE — A9270 NON-COVERED ITEM OR SERVICE: HCPCS | Mod: GY | Performed by: INTERNAL MEDICINE

## 2018-04-12 PROCEDURE — 25000132 ZZH RX MED GY IP 250 OP 250 PS 637: Mod: GY | Performed by: INTERNAL MEDICINE

## 2018-04-12 PROCEDURE — 36415 COLL VENOUS BLD VENIPUNCTURE: CPT | Performed by: PSYCHIATRY & NEUROLOGY

## 2018-04-12 PROCEDURE — 80048 BASIC METABOLIC PNL TOTAL CA: CPT | Performed by: PSYCHIATRY & NEUROLOGY

## 2018-04-12 PROCEDURE — 25000132 ZZH RX MED GY IP 250 OP 250 PS 637: Mod: GY | Performed by: PHYSICAL MEDICINE & REHABILITATION

## 2018-04-12 PROCEDURE — A9270 NON-COVERED ITEM OR SERVICE: HCPCS | Mod: GY | Performed by: PHYSICAL MEDICINE & REHABILITATION

## 2018-04-12 PROCEDURE — 97530 THERAPEUTIC ACTIVITIES: CPT | Mod: GO | Performed by: OCCUPATIONAL THERAPY ASSISTANT

## 2018-04-12 PROCEDURE — 84132 ASSAY OF SERUM POTASSIUM: CPT | Performed by: NEUROLOGICAL SURGERY

## 2018-04-12 PROCEDURE — 84132 ASSAY OF SERUM POTASSIUM: CPT | Performed by: PSYCHIATRY & NEUROLOGY

## 2018-04-12 PROCEDURE — 12800006 ZZH R&B REHAB

## 2018-04-12 PROCEDURE — 85027 COMPLETE CBC AUTOMATED: CPT | Performed by: PSYCHIATRY & NEUROLOGY

## 2018-04-12 PROCEDURE — 97535 SELF CARE MNGMENT TRAINING: CPT | Mod: GO | Performed by: OCCUPATIONAL THERAPY ASSISTANT

## 2018-04-12 PROCEDURE — 40000133 ZZH STATISTIC OT WARD VISIT: Performed by: OCCUPATIONAL THERAPY ASSISTANT

## 2018-04-12 PROCEDURE — 36415 COLL VENOUS BLD VENIPUNCTURE: CPT | Performed by: NEUROLOGICAL SURGERY

## 2018-04-12 PROCEDURE — 99239 HOSP IP/OBS DSCHRG MGMT >30: CPT | Performed by: INTERNAL MEDICINE

## 2018-04-12 RX ORDER — LEVETIRACETAM 500 MG/1
500 TABLET ORAL 2 TIMES DAILY
Status: DISCONTINUED | OUTPATIENT
Start: 2018-04-12 | End: 2018-04-20 | Stop reason: HOSPADM

## 2018-04-12 RX ORDER — LEVETIRACETAM 500 MG/1
500 TABLET ORAL 2 TIMES DAILY
Qty: 60 TABLET | Refills: 0
Start: 2018-04-12 | End: 2018-04-12

## 2018-04-12 RX ORDER — DOCUSATE SODIUM 100 MG/1
100 CAPSULE, LIQUID FILLED ORAL 2 TIMES DAILY PRN
Status: DISCONTINUED | OUTPATIENT
Start: 2018-04-12 | End: 2018-04-20 | Stop reason: HOSPADM

## 2018-04-12 RX ORDER — LEVETIRACETAM 500 MG/1
500 TABLET ORAL 2 TIMES DAILY
Refills: 0 | Status: ON HOLD
Start: 2018-04-12 | End: 2018-04-19

## 2018-04-12 RX ORDER — FLUTICASONE PROPIONATE 50 MCG
2 SPRAY, SUSPENSION (ML) NASAL 2 TIMES DAILY
Status: DISCONTINUED | OUTPATIENT
Start: 2018-04-12 | End: 2018-04-20 | Stop reason: HOSPADM

## 2018-04-12 RX ORDER — GABAPENTIN 600 MG/1
600 TABLET ORAL 2 TIMES DAILY
Status: DISCONTINUED | OUTPATIENT
Start: 2018-04-12 | End: 2018-04-20 | Stop reason: HOSPADM

## 2018-04-12 RX ORDER — TORSEMIDE 10 MG/1
20 TABLET ORAL DAILY
Status: DISCONTINUED | OUTPATIENT
Start: 2018-04-13 | End: 2018-04-20 | Stop reason: HOSPADM

## 2018-04-12 RX ORDER — CARVEDILOL 6.25 MG/1
6.25 TABLET ORAL 2 TIMES DAILY
Qty: 60 TABLET | Status: ON HOLD
Start: 2018-04-12 | End: 2018-04-19

## 2018-04-12 RX ORDER — ACETAMINOPHEN 325 MG/1
650 TABLET ORAL EVERY 6 HOURS PRN
Status: DISCONTINUED | OUTPATIENT
Start: 2018-04-12 | End: 2018-04-18

## 2018-04-12 RX ORDER — CARVEDILOL 6.25 MG/1
6.25 TABLET ORAL 2 TIMES DAILY
Status: DISCONTINUED | OUTPATIENT
Start: 2018-04-12 | End: 2018-04-15

## 2018-04-12 RX ADMIN — CARVEDILOL 6.25 MG: 6.25 TABLET, FILM COATED ORAL at 09:24

## 2018-04-12 RX ADMIN — TORSEMIDE 20 MG: 20 TABLET ORAL at 09:24

## 2018-04-12 RX ADMIN — LEVETIRACETAM 500 MG: 500 TABLET, FILM COATED ORAL at 09:26

## 2018-04-12 RX ADMIN — LEVETIRACETAM 500 MG: 500 TABLET, FILM COATED ORAL at 20:40

## 2018-04-12 RX ADMIN — FLUTICASONE PROPIONATE 2 SPRAY: 50 SPRAY, METERED NASAL at 20:41

## 2018-04-12 RX ADMIN — GABAPENTIN 600 MG: 600 TABLET, FILM COATED ORAL at 20:41

## 2018-04-12 RX ADMIN — CARVEDILOL 6.25 MG: 6.25 TABLET, FILM COATED ORAL at 20:41

## 2018-04-12 ASSESSMENT — ACTIVITIES OF DAILY LIVING (ADL)
NUMBER_OF_TIMES_PATIENT_HAS_FALLEN_WITHIN_LAST_SIX_MONTHS: 2
COGNITION: 1 - ATTENTION OR MEMORY DEFICITS
RETIRED_COMMUNICATION: 2-->DIFFICULTY SPEAKING (NOT RELATED TO LANGUAGE BARRIER)
FALL_HISTORY_WITHIN_LAST_SIX_MONTHS: YES
WHICH_OF_THE_ABOVE_FUNCTIONAL_RISKS_HAD_A_RECENT_ONSET_OR_CHANGE?: AMBULATION
TOILETING: 1-->ASSISTIVE EQUIPMENT
ADLS_ACUITY_SCORE: 15
ADLS_ACUITY_SCORE: 15
ADLS_ACUITY_SCORE: 14
TRANSFERRING: 0-->INDEPENDENT
RETIRED_EATING: 0-->INDEPENDENT
DRESS: 0-->INDEPENDENT
BATHING: 1-->ASSISTIVE EQUIPMENT
AMBULATION: 1-->ASSISTIVE EQUIPMENT
SWALLOWING: 2-->DIFFICULTY SWALLOWING FOODS

## 2018-04-12 NOTE — PLAN OF CARE
Problem: Patient Care Overview  Goal: Plan of Care/Patient Progress Review  Speech Language Therapy Discharge Summary    Reason for therapy discharge:    Discharged to acute rehabilitation facility.    Progress towards therapy goal(s). See goals on Care Plan in [facility] record for goal details.  Goals not met.  Barriers to achieving goals:   discharge from facility.    Therapy recommendation(s):    Continued therapy is recommended.  Rationale/Recommendations:  Recommend: 1. Advance diet to a Dysphagia Diet level 2 with thin liquids. 2. Upright, no straws, small sips or by spoon, double swallow with solids and alternate liquids/solids. 3. SLP will f/u for diet tolerance and swallow strategies. Pt will need cognitive-linguistic evaluation. .

## 2018-04-12 NOTE — H&P
[facility]   Acute Rehabilitation Unit  Admission History and Physical    chief complaint   Right Sudural Hematoma     History of Present illness  Antonio Ramirez is a 73 year old male with a PMHx of with a complex PMH antiphospholipid antibody syndrome s/p IVC filter placement and previously on coumadin, including alcohol abuse, left BKA, balance problems, and cardiomyopathy who presented to the ED via EMS after a fall on 4/7/18. Patient had refused to be taken to the hospital via EMS after initial evaluation. Patient began to get increasingly lethargic and wife called EMS again to transport patient to the ER. He was essentially unresponsive at this time. Head CT showed a subdural hematoma on the right with a leftward shift. Patient was taken to the OR for evacuation and underwent craniotomy. Placed in SICU post-surgery. Post-op CT showed mild improvement of midline shift and continued improvement in hemipareshis/dysphagia and mentation. Anticoag meds held. Put on Keppra 500mg BID for seizures prophylaxis. Roxicodone on board, but patient has not needed any pain medication in last 48 hrs.    During his acute hospitalization, patient was seen and evaluated by PT, OT, SLP.  All specialties collectively recommended that patient would benefit from ongoing therapies in the acute inpatient rehabilitation setting.      In review of the therapy notes, patient would benefit from assitance with ADLs. Continues to have trouble with balance and assist needed to open cap from toothpaste. Still needs min/mod assist to stand and ambulates with FWW SANDEEP to/from the bathroom. Noted to have symptoms of penetration/aspiration after a solid and with large bolus size. Is able to tolerate trials of semi-soft textures. Diet was advanced to DDL 2 with thin liquids. He requires SBA for bed mobility and transfers with a FWW. Has gait training with FWW and L prosthesis but still having some trouble  with balance.    Currently, the patient is medically appropriate and is assessed to have needs and will benefit from an inpatient acute rehabilitation comprehensive program working with PT, OT and SLP, and will also benefit from supervision and management of Rehab Nursing and Rehab MD.       PAST Medical History  Past Medical History:   Diagnosis Date     Alcoholism (H)      Amputated left leg (H)      Anemia      Anticardiolipin antibody syndrome (H)      Antiplatelet or antithrombotic long-term use      Aortic stenosis      Arthritis      Balance problem      Cardiomyopathy (H)      Coagulation disorder (H)     cardiolipinantibody syndrome     Coronary artery disease      Gastro-oesophageal reflux disease      Heart attack 2007     Hiatal hernia      Hypercholesterolemia      Hypertension      Left foot drop      Liver disease     alcoholic liver disease, alcoholic cirrohsosis, portal hypertension     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)     ?When diagnosed, patient not aware of this     Moderate mitral regurgitation      Monoclonal gammopathy      Neuropathy      Noninfectious ileitis     diverticulitis of colon     PE (pulmonary embolism) 2006     Prostate cancer (H) 2000    Treated with radiation (seed implants in 2000) -- no problems since     Renal disease     kidney stones     Syncope      Thoracic aortic aneurysm, without rupture      Thrombocytopenia (H)      Thrombosis of leg      Urethral stricture        Surgical History  Past Surgical History:   Procedure Laterality Date     AMPUTATE LEG BELOW KNEE Left 04/2014    related to gangrene, started as foot ulcer related to neuropathy     AMPUTATE LEG BELOW KNEE Left 12/4/2017    Procedure: AMPUTATE LEG BELOW KNEE;  Exploration of Left Leg Below Knee Amputation Stump with Ligation of Bleeders.;  Surgeon: Leandro Arreola MD;  Location: [facility] OR     APPENDECTOMY       APPLY WOUND VAC Left 12/6/2017    Procedure: APPLY WOUND VAC;;  Surgeon:  Saima Howard MD;  Location:  [facility]     ARTHROPLASTY KNEE Right 9/19/2014    Procedure: ARTHROPLASTY KNEE;  Surgeon: Saima Howard MD;  Location:  OR     CARDIAC SURGERY      CABG     CHOLECYSTECTOMY       COLONOSCOPY       CRANIOTOMY Right 4/7/2018    Procedure: CRANIOTOMY;  Right Craniotomy For Subdural Hematoma;  Surgeon: Jono Issa MD;  Location:  OR     CYSTOSCOPY, DILATE URETHRA, COMBINED N/A 10/12/2016    Procedure: COMBINED CYSTOSCOPY, DILATE URETHRA;  Surgeon: Dimitry Bello MD;  Location:  OR     ENDOSCOPIC STRIPPING VEIN(S)       esophagogastroduodenoscopy       EYE SURGERY      cataracts     GENITOURINARY SURGERY      Prostate Seen Implants     HERNIA REPAIR      Umbilical     INCISION AND DRAINAGE LOWER EXTREMITY, COMBINED Left 12/1/2017    Procedure: COMBINED INCISION AND DRAINAGE LOWER EXTREMITY;  INCISION AND DRAINAGE OF LEFT BELOW KNEE AMPUTATION ABSCESS, WOUND VAC PLACEMENT;  Surgeon: Saima Howard MD;  Location:  OR     IR IVC FILTER PLACEMENT       IRRIGATION AND DEBRIDEMENT LOWER EXTREMITY, COMBINED Left 12/6/2017    Procedure: COMBINED IRRIGATION AND DEBRIDEMENT LOWER EXTREMITY;  IRRIGATION AND DEBRIDEMENT OF LEFT BELOW KNEE AMPUTATION SITE WITH WOUND VAC REPLACEMENT;  Surgeon: Saima Howard MD;  [facility]     IRRIGATION AND DEBRIDEMENT LOWER EXTREMITY, COMBINED Left 12/8/2017    Procedure: COMBINED IRRIGATION AND DEBRIDEMENT LOWER EXTREMITY;  IRRIGATION AND DEBRIDEMENT OF LEFT BELOW THE KNEE AMPUTATION AND SHORTENING OF THE TIBIA WITH WOUND CLOSURE;  Surgeon: Saima Howard MD;  Location:  OR     ORTHOPEDIC SURGERY      (R) knee scope     ORTHOPEDIC SURGERY      total hip      ORTHOPEDIC SURGERY      elbow surgery       SOCIAL HISTORY  Marital Status:   Living situation: lives with wife in apartment. Wheelchair accessible building, no steps, has elevator and lives on 7th floor. Grab rales in bathroom, has tub with swinging  "door.  Family support: very supportive wife and close family near by  Vocational History: retired   Tobacco use: none  Alcohol use:  a bottle of vodka a day  Illicit drug use: none  Social History     Social History     Marital status:      Spouse name: N/A     Number of children: 2     Years of education: N/A     Occupational History     Retired       Social History Main Topics     Smoking status: Never Smoker     Smokeless tobacco: Never Used     Alcohol use Yes      Comment: quit 10/2015; now 3-5 Vodkas/night     Drug use: No     Sexual activity: Not Currently     Other Topics Concern     Not on file     Social History Narrative    , 2 children, retired . He does not have a living will but desires full code.  (last updated 2017)        FAMILY HISTORY  Family History   Problem Relation Age of Onset     Lung Cancer Mother      Heart Failure Father       age 87         Prior Functional history   Pt was independent with all ADLs/IADLs, transfers, mobility and gait.      Medications  Prescriptions Prior to Admission   Medication Sig Dispense Refill Last Dose     carvedilol (COREG) 6.25 MG tablet Take 1 tablet (6.25 mg) by mouth 2 times daily 60 tablet       levETIRAcetam (KEPPRA) 500 MG tablet Take 1 tablet (500 mg) by mouth 2 times daily for 14 days  0      torsemide (DEMADEX) 20 MG tablet Take 20 mg by mouth daily          ALLERGIES     Allergies   Allergen Reactions     Hmg-Coa-R Inhibitors Other (See Comments)     Rhabdomyolysis occurred within a couple days     Ciprofloxacin Itching     Severe itching \"like ants were crawling\"         Review of Systems  A 10 point ROS was performed and negative unless otherwise noted in HPI.     Constitutional: denies any fevers, chills.   Eyes: denies changes in visual acuity, intermittent pain over right eye  Ears, Nose, Throat: denies any difficulty swallowing   Cardiovascular: denies any exertional chest pain or " "palpitation   Respiratory: denies dyspnea at rest, Mild MCCRACKEN  Gastrointestinal: denies any nausea, vomiting, abdominal pain, diarrhea or constipation   Genitourinary: denies any dysuria, hematuria, frequency or urgency   Musculoskeletal: denies any muscle pain, joint pain, neck pain or back pain  Neurologic: denies any headache, stocking glove distribution sensation loss of the RLE with numbness below the ankle,  Tingling in fingertips  Psychiatric: denies mood changes; sleeps OK         Physical Exam  VITAL SIGNS:  /60  Pulse 60  Temp 96  F (35.6  C) (Oral)  Resp 16  Ht 1.854 m (6' 1\")  Wt 86 kg (189 lb 8 oz)  SpO2 99%  BMI 25 kg/m2  BMI:  Estimated body mass index is 25 kg/(m^2) as calculated from the following:    Height as of this encounter: 1.854 m (6' 1\").    Weight as of this encounter: 86 kg (189 lb 8 oz).     General: NAD, pleasant and cooperative   HEENT: ATNC, oropharynx clear with MMM, EOMI, PERRL    Pulmonary: clear breath sounds b/l, no rales/wheezing    Cardiovascular: irregularly irregular rhythm, no murmur   Abdominal: soft, non-tender, non-distended  Extremities: warm, well perfused, no edema in bilateral lower extremities, no tenderness in calves  MSK/neuro:   Mental Status:  alert and oriented   Cranial Nerves: grossly normal   1. 2nd CN: Pupils equal, round, reactive to light and accomodation. and visual fields intact to confrontation.   2. 3rd,4th,6th CN:  EOMI, appropriate pupillary responses  3. 5th CN: facial sensation intact   4. 7th CN: face symmetrical   5. 8th CN: functional hearing bilaterally  6. 9th, 10th CN: palate elevates symmetrically   7. 11th CN: sternocleidomastoids and trapezii strong   8. 12th CN: tongue midline and without fasciculations     Sensory: stock in glove distribution sensation loss of the RLE with numbness below the ankle   Strength:   SF  EF  EE  WE  G  I  HF  KE  DF  EHL  PF   R  5/5       5/5        5/5        5/5       5/5      4/5         5/5      "   5/5       5/5        5/5        5/5  L  5/5       4/5        4/5        4/5       5/5      4/5         5/5        -- -- -- --   Reflexes: Present and symmetrical    Abnormal movements: None   Coordination: No dysmetria on finger to nose b/l    Speech: understandable, no slurring   Cognition: appears intact during short conversation     Gait: did not assess    Skin: Post-surgical incision clean and intact with stitches. no bruising or bleeding, normal skin color, texture, turgor and no redness, warmth, or swelling      Labs  Pertinent labs reviewed    Lab Results   Component Value Date    WBC 5.5 04/12/2018    HGB 10.3 (L) 04/12/2018    HCT 32.1 (L) 04/12/2018    MCV 80 04/12/2018     (L) 04/12/2018     Lab Results   Component Value Date     04/12/2018    POTASSIUM 3.9 04/12/2018    CHLORIDE 107 04/12/2018    CO2 24 04/12/2018    GLC 87 04/12/2018     Lab Results   Component Value Date    GFRESTIMATED 83 04/12/2018    GFRESTBLACK >90 04/12/2018     Lab Results   Component Value Date    AST 30 04/07/2018    ALT 17 04/07/2018    ALKPHOS 93 04/07/2018    BILITOTAL 2.2 (H) 04/07/2018    JOVANA 58 (H) 12/15/2017     Lab Results   Component Value Date    INR 1.39 (H) 04/11/2018     Lab Results   Component Value Date    BUN 16 04/12/2018    CR 0.90 04/12/2018         ASSESSMENT/PLAN:  Antonio Ramirez is a 73 year old male with a complex PMH antiphospholipid antibody syndrome s/p IVC filter placement and previously on coumadin, including alcohol abuse, left BKA, balance problems, and cardiomyopathy who presents to the ARU for a subdural hematoma requiring evaculation and emergent craniotomy on 4/7/18 after a mechanical fall. He continues to need assistance with ADLs, on DDL2 with thin liquids, and still requires SBA for transfers, but can walk 100ft.       Admission to acute inpatient rehab 4/12/2018.    Impairment group code:       1. PT, OT and SLP 60 minutes of each on a daily basis, in addition to rehab  nursing and close management of physiatrist.      2. Impairment of ADL's: Daily OT for 60 min daily to work on upper and lower body self care, dressing, toileting, bathing, energy conservation techniques with use of ADs as needed.     3. Impairment of mobility:  Daily PT for 60 min daily to work on gait exercises, strengthening, endurance buildup, transfers with use of walker as needed.     4. Impairment of cognition/language/swallow:  Daily SLP for 60 min daily for cognitive evaluation and treatment strategies for higher level cognitive deficits and memory impairment.     5. Rehab RN to administer medication, patient education on medication taking, VS monitoring, bowel regimen, glucose monitoring and wound care/surgical wound dressing changes and monitoring.    1. Medical Conditions  # Right frontotemporal parietal subdural hematoma with midline shift 2/2 fall:  S/p craniotomy on 7/7. Occurred due to trauma after fall on 4/4. At the time of arrival on 4/7 he was essentially unresponsive with head CT showing a subdural hematoma on the right with midline shift. Was subsequently taken to OR for frontoparietal craniotomy and evacuation. Post-op CT w/ improvement of midline shift and continued improvement in hemiparesis/dysphagia and mentation. Was on warfarin for his AAS with history of DVT and PE; which is currently on hold.  Neurosurgery recommends holding it for at least 4 weeks until repeat CT is done an outpatient follow-up with neurosurgery before reinitiation of anticoagulation. Patient already has a IVC filter in place.   -  In case of emergent need for anticoagulation prior to this, neurosurgery recommends initiation without bolus.   - Tylenol available for pain prn   - Seizure prophylaxis, keppra 500 mg BID, plan for total of 2 weeks DC on 4/21/18  - Staples to be removed on 4/21/2018, may be done at the ARU      # Antiphospholipid Antibody Syndrome (AAS), IVC filter in place, on chronic  anticoagulation:  History of Pulmonary Embolism, Deep Vein Thrombosis:   Will continue to hold PTA warfarin and ASA per neurosurgery recommendations as stated above.    - Patient had DVT/massive PE with saddle embolus about 4-5 years back and not in the recent past.  Already has a IVC filter.   - Neurosurgery strictly recommending against reinitiation of anticoagulation until repeat CT shows stability unless absolutely necessary.  --------- Of note given his antiphospholipid antibody syndrome Coumadin dosing should be based on chromogenic factor 10 when appropriate to resume   - Will not be on any anticoagulation medication while on ARU, will have SCD's for mechanical prophylaxis     # Paroxysmal atrial fibrillation on chronic anticoagulation with noted slowed ventricular response:   Pt received atropine post operatively for bradycardia HR 30s.  Patient tolerating PTA coreg. At increased dose of 6.25mg (was 3.125mg PTA) due to intermittent elevated BPs and tolerating well.  - Holding PTA warfarin as noted above and should be initiated once cleared by neurosurgery   - Will cont Coreg 6.25 while on ARU      # Chronic anemia.  - Hemoglobin  baseline range 8.0-11  -Has been stable around 9-10.  No active signs of bleeding.    - Will cont to monitor      # History of alcohol use disorder.  # Alcoholic cirrhosis with ascites, portal hypertension.  - Drinks approx 1/3rd bottle of vodka a day at home  - No withdrawal symptoms on this past hospital admission  - Will monitor although out of window by this time.   - Dr. Pope to see him if needed       # HFrEF, EF 40-45%.  # Cardiomyopathy.  # Mild valvular aortic stenosis, moderate mitral regurgitation, mild to moderate tricuspid regurgitation, mild ascending aorta dilatation.  # Coronary artery disease s/p 3-v CABG.  # Thoracic aortic aneurysm without rupture.  - BNP on admission 4334  - Continue PTA Coreg  - Holding PTA ASA, warfarin as above  - Not on statin PTA due to  intolerance  -Continue PTA torsemide.  Patient was euvolemic at the time of admission to ARU      # Hyperlipidemia.  - Does not tolerate statin therapy, not on PTA medication for above diagnosis; will continue to monitor at this time.      # Essential hypertension.  - Current SBP goal <140, currently on coreg and torsemide and tolerating well  -monitor and adjust medication as needed      # Chronic kidney disease, stage 3.  - History of ANSELMO requiring HD in 2010  Renal function has been stable, will monitor intermittently, avoid nephrotoxins      # Suspected urinary tract infection. Resolved  - U/A noted positive nitrites, large leukocyte esterase and >182 WBC  -Was on Ancef perioperatively.  Urine culture with 50,000- 100,000 CFU Klebsiella and patient currently asymptomatic, no leukocytosis  -Continued to monitor off antibiotics      # Gastroesophageal reflux disease.  - Not on any PTA medications for above diagnosis, will continue to monitor at this time.      # Low grade B cell lymphoproliferative disorder.  History of prostate cancer s/p radiative seed implants.  - No acute issues, will continue to monitor.      # Peripheral polyneuropathy.  - Holding PTA gabapentin at this time as patient gradually clearing on his mentation.  Patient asymptomatic currently.  Should be able to resume Neurontin once back to his baseline and if he starts having symptoms  - Could restart on ARU if he is having symptoms and pain as his cognition is improving   - Will monitor for now       # Discharge Pain Plan:   - Patient currently has NO PAIN and is not being prescribed pain medications on discharge.    2. Adjustment to disability:  Clinical psychology to eval and treat if needed  3. FEN: DDL2 with thin liquids , and magic cup in between meals  4. Bowel: PRN bowel program  5. Bladder: PVR x2 and IC for volumes >400cc  6. DVT Prophylaxis: N/A  7. GI Prophylaxis: N/A  8. Code: Full code   9. Disposition: home likely with increased  family support.  10. ELOS:  7-14 days.  11. Rehab prognosis:  good  12. Follow up Appointments on Discharge: PCP, neurosurgery in 4 wks    Yony Rodriguez, MS4  [facility]    Kyle Bobby MD PGY-2  Physical Medicine & Rehabilitation  Pager: [phone]      Post Admission Physician Evaluation:     I have compared Antonio Ramirez's condition on admission to acute rehabilitation to that outlined in the preadmission screen. History and physical exam performed by me.    No significant differences are identified and the patient remains appropriate for an inpatient rehabilitation facility level of care to manage medical issues and address functional impairments due to SDH     Comorbid medical conditions being managed: SDH, APS, PAFib, chronic anemia, ETOH dependence, cirrhosis, cardiomyopathy, HTN, HL, UTI,  GERD, PNeuropathy and b cell lymphoproliferative disorder      Prior functional level: Previously independent with mobility and ADLs, although had become less active over the last several months due to progression of illness.      Present function:   SBA with transfers, dysphagia and impaired balance      Anticipated rehabilitation course: Anticipate he  will require 1-2 weeks. Anticipate he will be discharged home with hisfamily for support      Will benefit from intensive rehabilitation includin minutes each of PT, OT and SLP - seven days a week. Anticipate he will be able to tolerate the intensity of the therapies.        Rehabilitation nursing; has rehab nursing needs in the reenforcement of the strategies, taught by the therapists, and bowel and bladder management   Close management by physiatry.      Prognosis:  fair    Estimated length of stay: 1-2 weeks         Louise Kiser MD, A   Department of Physical Medicine and Rehabilitation  [facility]           Total time spent: 70 minutes with more than half the time was spent counseling and / or coordination  of care   Includes counseling / education / discussion     Louise Kiser MD, MHA

## 2018-04-12 NOTE — PROGRESS NOTES
[facility]                HISTORY AND PHYSICAL NOTE         Patient Name: Antonio Ramirez   YOB: 1945  MRN: [id number]     Age / Sex: 73 year old male    Admit Date: 04/12/18    Reason for Admission: intense rehabilitation for subdural hematoma status post evacuation    History of Present Illness  Antonio Ramirez is a 73 year old fe#male who is being admitted to the ARU on 04/12/18    He is an ambidextrous retired  with history of alcoholism, left BKA coronary artery disease MI in 2007, anti-cardiolipin antibody syndrome, aortic stenosis coronary artery disease, hypercholesterolemia, essential hypertension, and alcoholic liver disease along with 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    CT scans showed a subdural hematoma on the right with the midline shift  He was taken to the OR emergently and underwent evacuation with a right frontoparietal craniotomy on 7 April    Postoperatively hemiparesis and dysphagia as well as the mentation improved  Staples are to be removed on the 21st    He was on anticoagulation given the history of PE and DVT in the setting of antiphospholipid antibody syndrome, which is currently on hold.  Neurosurgery recommends hold for at least 4 weeks.  He status post IVC filter.      Functionally, the patient was independent with basic mobility and basic ADL's prior to admit.     Currently, the patient is medically appropriate and is assessed to have needs and will benefit from an inpatient acute rehabilitation comprehensive program working with Physical and Occupational Therapist and will benefit from supervision and management of Rehab Nursing and Rehab MD.     Allergies  Allergies   Allergen Reactions     Hmg-Coa-R Inhibitors Other (See Comments)      "Rhabdomyolysis occurred within a couple days     Ciprofloxacin Itching     Severe itching \"like ants were crawling\"       Past Medical and Surgical History  Past Medical History:   Diagnosis Date     Alcoholism (H)      Amputated left leg (H)      Anemia      Anticardiolipin antibody syndrome (H)      Antiplatelet or antithrombotic long-term use      Aortic stenosis      Arthritis      Balance problem      Cardiomyopathy (H)      Coagulation disorder (H)     cardiolipinantibody syndrome     Coronary artery disease      Gastro-oesophageal reflux disease      Heart attack 2007     Hiatal hernia      Hypercholesterolemia      Hypertension      Left foot drop      Liver disease     alcoholic liver disease, alcoholic cirrohsosis, portal hypertension     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)     ?When diagnosed, patient not aware of this     Moderate mitral regurgitation      Monoclonal gammopathy      Neuropathy      Noninfectious ileitis     diverticulitis of colon     PE (pulmonary embolism) 2006     Prostate cancer (H) 2000    Treated with radiation (seed implants in 2000) -- no problems since     Renal disease     kidney stones     Syncope      Thoracic aortic aneurysm, without rupture      Thrombocytopenia (H)      Thrombosis of leg      Urethral stricture      Past Surgical History:   Procedure Laterality Date     AMPUTATE LEG BELOW KNEE Left 04/2014    related to gangrene, started as foot ulcer related to neuropathy     AMPUTATE LEG BELOW KNEE Left 12/4/2017    Procedure: AMPUTATE LEG BELOW KNEE;  Exploration of Left Leg Below Knee Amputation Stump with Ligation of Bleeders.;  Surgeon: Leandro Arreola MD;  Location:  OR     APPENDECTOMY       APPLY WOUND VAC Left 12/6/2017    Procedure: APPLY WOUND VAC;;  Surgeon: Saima Howard MD;  Location:  SD     ARTHROPLASTY KNEE Right 9/19/2014    Procedure: ARTHROPLASTY KNEE;  Surgeon: Saima Howard MD;  Location:  OR     CARDIAC SURGERY      " CABG     CHOLECYSTECTOMY       COLONOSCOPY       CRANIOTOMY Right 4/7/2018    Procedure: CRANIOTOMY;  Right Craniotomy For Subdural Hematoma;  Surgeon: Jono Issa MD;  Location:  OR     CYSTOSCOPY, DILATE URETHRA, COMBINED N/A 10/12/2016    Procedure: COMBINED CYSTOSCOPY, DILATE URETHRA;  Surgeon: Dimitry Bello MD;  Location:  OR     ENDOSCOPIC STRIPPING VEIN(S)       esophagogastroduodenoscopy       EYE SURGERY      cataracts     GENITOURINARY SURGERY      Prostate Seen Implants     HERNIA REPAIR      Umbilical     INCISION AND DRAINAGE LOWER EXTREMITY, COMBINED Left 12/1/2017    Procedure: COMBINED INCISION AND DRAINAGE LOWER EXTREMITY;  INCISION AND DRAINAGE OF LEFT BELOW KNEE AMPUTATION ABSCESS, WOUND VAC PLACEMENT;  Surgeon: Saima Howard MD;  Location:  OR     IR IVC FILTER PLACEMENT       IRRIGATION AND DEBRIDEMENT LOWER EXTREMITY, COMBINED Left 12/6/2017    Procedure: COMBINED IRRIGATION AND DEBRIDEMENT LOWER EXTREMITY;  IRRIGATION AND DEBRIDEMENT OF LEFT BELOW KNEE AMPUTATION SITE WITH WOUND VAC REPLACEMENT;  Surgeon: Saima Howard MD;  Location:  SD     IRRIGATION AND DEBRIDEMENT LOWER EXTREMITY, COMBINED Left 12/8/2017    Procedure: COMBINED IRRIGATION AND DEBRIDEMENT LOWER EXTREMITY;  IRRIGATION AND DEBRIDEMENT OF LEFT BELOW THE KNEE AMPUTATION AND SHORTENING OF THE TIBIA WITH WOUND CLOSURE;  Surgeon: Saima Howard MD;  Location:  OR     ORTHOPEDIC SURGERY      (R) knee scope     ORTHOPEDIC SURGERY      total hip      ORTHOPEDIC SURGERY      elbow surgery         Social History  The patient lives with his wife Helena in a apartment with no stairs needed to access  He is a retired   His wife is his primary caretaker and available 24 7    The patient was previously independent with ambulation without use of cane or walker, previously independent with upper and lower body self care, ADLs, and IADLs.  At times he used a cane with a left BKA        Review of  "Systems  10 point ROS neg other than the symptoms noted above in the HPI and below:  Review Of Systems  Reports headaches on and off  Concurs with phantom pain  Notes phantom sensation is on Neurontin for it  Last bowel movement today  No issues with urinating  Denies any weakness  Denies any changes in vision  Notes tingling and numbness of hands and feet  Remainder of the review of the systems was negative.        Physical Examination  /47 (BP Location: Right arm)  Pulse 54  Temp 96.7  F (35.9  C) (Oral)  Resp 14  Ht 1.854 m (6' 1\")  Wt 86 kg (189 lb 8 oz)  SpO2 99%  BMI 25 kg/m2  General Awake, alert, not in distress  HEENT EOMs intact  Cardiac Regular  Respiratory Clear breath sounds  Abdomen Soft, nontender  He has a left BKA in a prosthesis  Examination of the upper extremities is 5 out of 5  Right lower extremity strength is 4 out of 5 proximally and 5 out of 5 distally  Impaired sensation to light touch  Good vibration and joint sensation.    Examination of the head is consistent with the scalp incision clean dry and intact  Open to air  No bogginess around         Labs  Lab Results   Component Value Date    WBC 5.5 04/12/2018         Lab Results   Component Value Date    RBC 4.03 04/12/2018     Lab Results   Component Value Date    HGB 10.3 04/12/2018     Lab Results   Component Value Date    HCT 32.1 04/12/2018     No components found for: MCT  Lab Results   Component Value Date    MCV 80 04/12/2018     Lab Results   Component Value Date    MCH 25.6 04/12/2018     Lab Results   Component Value Date    MCHC 32.1 04/12/2018     Lab Results   Component Value Date    RDW 19.3 04/12/2018     Lab Results   Component Value Date     04/12/2018       Lab Results   Component Value Date     04/12/2018      Lab Results   Component Value Date    POTASSIUM 3.9 04/12/2018     Lab Results   Component Value Date    CHLORIDE 107 04/12/2018     Lab Results   Component Value Date    BENNIE 8.7 " 04/12/2018     Lab Results   Component Value Date    CO2 24 04/12/2018     Lab Results   Component Value Date    BUN 16 04/12/2018     Lab Results   Component Value Date    CR 0.90 04/12/2018     Lab Results   Component Value Date    GLC 87 04/12/2018     No components found for: MRI      Medications  Current Facility-Administered Medications   Medication     carvedilol (COREG) tablet 6.25 mg     levETIRAcetam (KEPPRA) tablet 500 mg     [START ON 4/13/2018] torsemide (DEMADEX) tablet 20 mg     acetaminophen (TYLENOL) tablet 650 mg     docusate sodium (COLACE) capsule 100 mg         ASSESSMENT / MANAGEMENT AND INITIATION OF PLAN OF CARE:      POST-ADMISSION EVALUATION:  I have evaluated the patient on admission to the Acute Rehab Center and compared with the preadmission screen, no significant differences are identified and remains appropriate for acute rehabilitation. See below for more details and specifics regarding this admission that supports requiring medical and therapy intensity in the acute rehab setting.      Patient will work with PT for 60 min daily to work on gait exercises, strengthening, endurance buildup, transfers with use of walker as needed.   Patient will work with OT for 60 min daily to work on upper and lower body self care, dressing, toileting, bathing, energy conservation techniques with use of ADs as needed.   Patient will work with SLP for 60 min daily for cognitive evaluation and treatment strategies for higher level cognitive deficits and memory impairment.   Rehab RN to administer medication, patient education on medication taking, VS monitoring, and surgical wound dressing changes and monitoring.     Medical Management:  Right frontoparietal temporal subdural hematoma with midline shift secondary to mechanical fall status post craniotomy; he currently has sutures which can be removed on the 21st at the ER you  Antiphospholipid antibody syndrome, status post IVC filter in place.  He was  on Coumadin and aspirin prior to this fall however these have been on hold for the next 4 weeks.  He will follow-up with neurosurgery with a CT scan upon which decision will be made whether to initiate the Coumadin.    Paroxysmal atrial fibrillation on chronic anticoagulation with noted slow ventricular response postoperatively, he was bradycardic in 30s.  He is currently on Coreg which we will continue  Phantom pain; on Neurontin  Keppra for seizure prophylaxis for 10 days at 500 mg p.o. twice daily  He was taking oxycodone for pain to the admit which we will discontinue  History of MI/coronary artery disease back in 2007.  He denies any chest pain shortness of breath.  No pedal edema is noted we will continue Coreg for now.  His last EF is noted to be 45%.  He does have mild valvular aortic stenosis with moderate mitral regurgitation and mild-to-moderate tricuspid regurgitation.  He also has a thoracic aortic aneurysm without rupture.  Continue torsemide unchanged.    History of hyperlipidemia currently not on statins.    History of essential hypertension, continue Coreg  History of chronic kidney disease stage III.  He was on hemodialysis in 2010.  We will continue to monitor his renal function closely and avoid nephrotoxins.    History of alcoholics dependence; he drinks vodka 200 mL daily.  He did not have any withdrawal during this hospital stay.  We will continue to monitor.    Bladder, Bowel, GI and DVT ppx   He was independent with basic mobility and basic ADL's prior to admit.     Code Status full    Prognosis for medical improvement and stabilization of the medical issues for discharge to home is good.     Estimated Length of Stay: 1-2 weeks    Follow up Appointments on Discharge    Post Admission Physician Evaluation:     I have compared Antonio Ramirez's condition on admission to acute rehabilitation to that outlined in the preadmission screen. History and physical exam performed by me.    No  significant differences are identified and the patient remains appropriate for an inpatient rehabilitation facility level of care to manage medical issues and address functional impairments due to subdural hematoma     Comorbid medical conditions being managed:   Seizure prophylaxis, subdural hematoma status post evacuation, coronary artery disease, atrial fibrillation, antiphospholipid antibody syndrome, chronic anemia, history of alcohol disorder.     Prior functional level: Previously independent with mobility and ADLs, although had become less active over the last several months due to progression of illness.      Present function:   He is currently requiring minimal assist to contact-guard assist for ambulating 100 feet due to balance deficits with a walker transfers are minimal assist with a walker     Anticipated rehabilitation course: Anticipate he  will require 1-2 weeks. Anticipate he will be discharged home with hisfamily for support      Will benefit from intensive rehabilitation includin minutes each of PT, OT and SLP - seven days a week. Anticipate he will be able to tolerate the intensity of the therapies.        Rehabilitation nursing; has rehab nursing needs in the reenforcement of the strategies, taught by the therapists, and bowel and bladder management   Close management by physiatry.      Prognosis:  fair    Estimated length of stay: 1-2 weeks        Louise Kiser MD, LAA   Department of Physical Medicine and Rehabilitation  [facility]           Total time spent: 70 minutes with more than half the time was spent counseling and / or coordination of care   Includes counseling / education / discussion     PERI Garcia MD

## 2018-04-12 NOTE — IP AVS SNAPSHOT
MRN:[id number]                      After Visit Summary   4/12/2018    Antonio Ramirez    MRN: [id number]           Thank you!     Thank you for choosing [facility] for your care. Our goal is always to provide you with excellent care. Hearing back from our patients is one way we can continue to improve our services. Please take a few minutes to complete the written survey that you may receive in the mail after you visit with us. Thank you!        Patient Information     Date Of Birth          1945        About your hospital stay     You were admitted on:  April 12, 2018 You last received care in the:   [facility]    You were discharged on:  April 20, 2018        Reason for your hospital stay       You were admitted to acute rehab for a brain bleed (subdural hematoma) which required neurosurgical intervention.  You recovered well and are being discharged home with instructions to continue with therapy.                  Who to Call     For medical emergencies, please call 911.  For non-urgent questions about your medical care, please call your primary care provider or clinic, [phone]          Attending Provider     Provider Specialty    Coy Us MD Physical Medicine and Rehab       Primary Care Provider Office Phone # Fax #    Main Hardy [phone]      After Care Instructions     Activity       Your activity upon discharge: activity as tolerated pe therapy recommendations, no driving until cleared to do so by your physician            Diet       Follow this diet upon discharge: Orders Placed This Encounter      Room Service      Regular Diet Adult            Wound care and dressings       Instructions to care for your wound at home: can shower with water running over the top of the wound, do not submerge under water, can leave open to air.                  Follow-up Appointments     [facility] Follow-up and recommended labs and tests       Follow up  with primary care provider, Main Hardy, within 7 days to evaluate medication change, to evaluate treatment change and to evaluate after surgery.  No follow up labs or test are needed.    Follow-up in 4 weeks with Neurosurgery in clinic       Appointments on [facility] and/or [facility] (with [facility] or [facility] provider or service). Call [phone] if you haven't heard regarding these appointments within 7 days of discharge.                  Your next 10 appointments already scheduled     Apr 24, 2018  4:00 PM CDT   Neuro Eval with Silvia Goel, PT   [facility] ([facility])    [address]  [facility] [id number] [phone]            May 02, 2018 11:15 AM CDT   CT HEAD W/O CONTRAST with SHCT1   [facility] CT ([facility])    [address] [facility] [id number] [phone]           Please bring any scans or X-rays taken at other hospitals, if similar tests were done. Also bring a list of your medicines, including vitamins, minerals and over-the-counter drugs. It is safest to leave personal items at home.  Be sure to tell your doctor:   If you have any allergies.   If there s any chance you are pregnant.   If you are breastfeeding.  You do not need to do anything special to prepare for this exam.  Please wear loose clothing, such as a sweat suit or jogging clothes. Avoid snaps, zippers and other metal. We may ask you to undress and put on a hospital gown.            May 02, 2018 12:10 PM CDT   Return Visit with Gracy Hall NP   [facility] ([facility])    [address]  [address]  [facility] [id number] [phone]            May 16, 2018  9:15 AM CDT   Neuro Eval with Radha Lloyd OT   [facility] Occupational Therapy ([facility])    [address]  [facility] [id number] [phone]              Additional Services      "Occupational Therapy Referral       *This therapy referral will be filtered to a centralized scheduling office at [facility] and the patient will receive a call to schedule an appointment at a [place] location most convenient for them. *     [facility] provides Physical Therapy evaluation and treatment and many specialty services across the [place] system.  If requesting a specialty program, please choose from the list below.    If you have not heard from the scheduling office within 2 business days, please call [phone] for all locations, with the exception of [place], please call [phone] and [place], please call [phone]  Treatment: Evaluation & Treatment  Special Instructions/Modalities: ready for outpatient PT and OT following SDH and initial in home therapy.    Please be aware that coverage of these services is subject to the terms and limitations of your health insurance plan.  Call member services at your health plan with any benefit or coverage questions.      **Note to Provider:  If you are referring outside of [place] for the therapy appointment, please list the name of the location in the \"special instructions\" above, print the referral and give to the patient to schedule the appointment.            Physical Therapy Referral       *This therapy referral will be filtered to a centralized scheduling office at [facility] and the patient will receive a call to schedule an appointment at a [place] location most convenient for them. *     [facility] provides Physical Therapy evaluation and treatment and many specialty services across the [place] system.  If requesting a specialty program, please choose from the list below.    If you have not heard from the scheduling office within 2 business days, please call [phone] for all locations, with the exception of [place], please call [phone] and [place] " "[place], please call [phone]  Treatment: Evaluation & Treatment  Special Instructions/Modalities: ready for outpatient PT and OT following SDH and initial in home therapy.    Please be aware that coverage of these services is subject to the terms and limitations of your health insurance plan.  Call member services at your health plan with any benefit or coverage questions.      **Note to Provider:  If you are referring outside of [place] for the therapy appointment, please list the name of the location in the \"special instructions\" above, print the referral and give to the patient to schedule the appointment.                  Pending Results     No orders found from 4/10/2018 to 4/13/2018.            Statement of Approval     Ordered          04/19/18 1500  I have reviewed and agree with all the recommendations and orders detailed in this document.  EFFECTIVE NOW     Approved and electronically signed by:  Coy Us MD             Admission Information     Date & Time Provider Department Dept. Phone    4/12/2018 Coy Us MD  ACUTE REHAB [phone]      Your Vitals Were     Blood Pressure Pulse Temperature Respirations Height Weight    124/57 (BP Location: Left arm) 58 97.5  F (36.4  C) (Oral) 16 1.854 m (6' 1\") 90.5 kg (199 lb 8 oz)    Pulse Oximetry BMI (Body Mass Index)                97% 26.32 kg/m2          MyChart Information     [facility] gives you secure access to your electronic health record. If you see a primary care provider, you can also send messages to your care team and make appointments. If you have questions, please call your primary care clinic.  If you do not have a primary care provider, please call [phone] and they will assist you.        Care EveryWhere ID     This is your Care EveryWhere ID. This could be used by other organizations to access your [facility] medical records  [id number]        Equal Access to Services     ANTHONY DORANTES AH: Evelin rodrigues " Soomaali, waaxda luqadaha, qaybta kaalmada adelalitha, igor beanjigna maria del rosario. So [facility] [phone].    ATENCIÓN: Si aries frances, tiene a shrestha disposición servicios gratuitos de asistencia lingüística. Anabelle al [phone].    We comply with applicable federal civil rights laws and [place] laws. We do not discriminate on the basis of race, color, national origin, age, disability, sex, sexual orientation, or gender identity.               Review of your medicines      START taking        Dose / Directions    blood pressure monitor/m cuff Misc   Used for:  Essential hypertension        Dose:  1 each   1 each once for 1 dose   Quantity:  1 each   Refills:  0         CONTINUE these medicines which have NOT CHANGED        Dose / Directions    fluticasone 50 MCG/ACT spray   Commonly known as:  FLONASE        Dose:  2 spray   Spray 2 sprays into both nostrils daily   Refills:  0       GABAPENTIN PO   Indication:  Neurogenic Pain        Dose:  600 mg   Take 600 mg by mouth 2 times daily   Refills:  0       levETIRAcetam 500 MG tablet   Commonly known as:  KEPPRA   Used for:  Subdural hemorrhage (H)        Dose:  500 mg   Take 1 tablet (500 mg) by mouth 2 times daily   Quantity:  60 tablet   Refills:  0       torsemide 20 MG tablet   Commonly known as:  DEMADEX        Dose:  20 mg   Take 20 mg by mouth daily   Refills:  0         STOP taking     carvedilol 6.25 MG tablet   Commonly known as:  COREG                Where to get your medicines      These medications were sent to [facility] [place], [place] - [address]  [address] [address]     Phone:  [phone]     levETIRAcetam 500 MG tablet         Some of these will need a paper prescription and others can be bought over the counter. Ask your nurse if you have questions.     Bring a paper prescription for each of these medications     blood pressure monitor/m cuff Misc                Protect others around  you: Learn how to safely use, store and throw away your medicines at www.disposemymeds.org.             Medication List: This is a list of all your medications and when to take them. Check marks below indicate your daily home schedule. Keep this list as a reference.      Medications           Morning Afternoon Evening Bedtime As Needed    blood pressure monitor/m cuff Misc   1 each once for 1 dose                                fluticasone 50 MCG/ACT spray   Commonly known as:  FLONASE   Spray 2 sprays into both nostrils daily   Last time this was given:  2 sprays on 4/20/2018  8:10 AM                                   GABAPENTIN PO   Take 600 mg by mouth 2 times daily   Last time this was given:  600 mg on 4/20/2018  8:10 AM            8:00am           8:00pm               levETIRAcetam 500 MG tablet   Commonly known as:  KEPPRA   Take 1 tablet (500 mg) by mouth 2 times daily   Last time this was given:  500 mg on 4/20/2018  8:09 AM            8:00am           8:00pm               torsemide 20 MG tablet   Commonly known as:  DEMADEX   Take 20 mg by mouth daily   Last time this was given:  20 mg on 4/20/2018  8:09 AM

## 2018-04-12 NOTE — PROGRESS NOTES
CM    I:  Will close SW consult.  Patient appears to have a safe discharge plan in place as he transfers to [facility].    P:  No SW interventions anticipated.    TREMAYNE Auguste

## 2018-04-12 NOTE — IP AVS SNAPSHOT
[facility]    [address] [place] [address]    Phone:  [phone]                                       After Visit Summary   4/12/2018    Antonio Ramirez    MRN: [id number]           After Visit Summary Signature Page     I have received my discharge instructions, and my questions have been answered. I have discussed any challenges I see with this plan with the nurse or doctor.    ..........................................................................................................................................  Patient/Patient Representative Signature      ..........................................................................................................................................  Patient Representative Print Name and Relationship to Patient    ..................................................               ................................................  Date                                            Time    ..........................................................................................................................................  Reviewed by Signature/Title    ...................................................              ..............................................  Date                                                            Time

## 2018-04-12 NOTE — PLAN OF CARE
Problem: Patient Care Overview  Goal: Plan of Care/Patient Progress Review  Discharge Planner OT   Patient plan for discharge: none stated  Current status: Pt completed supine to sit EOB SBA, Pepper sit to stand, amb with CGA to/from bathroom toilet transfer with grab bars CGA, cues needed for clothing management prior to sitting. Stood at sink for ADLS CGA for balance, pt with decrease RUE FMC/strength, uses mouth to loosen up cap on toothpaste.   Barriers to return to prior living situation: needs A with ADL and mobility, confusion  Recommendations for discharge: ARU per plan established by the Occupational Therapist  Rationale for recommendations: not at baseline would benefit from daily therapy to return to PLOF       Entered by: Saba Wang 04/12/2018 12:29 PM      Pt discharging to ARU, GOALS NOT MET, see discharge summary

## 2018-04-12 NOTE — PLAN OF CARE
Problem: Patient Care: Nursing Focus  Goal: Safety Management  Pt oriented to room & unit.  Pleasant & cooperative w/all cares.  Ordered lunch independently shortly after arriving.  Nutrition consult placed d/t recent weight loss & decrease in appetite.  DD2/thin liquids.  A of 1 w/walker.  No c/o pain.  Using call light appropriately & calling.

## 2018-04-12 NOTE — PLAN OF CARE
Problem: Patient Care Overview  Goal: Plan of Care/Patient Progress Review  Outcome: Improving  A&Ox4. Neuros intact except slight L facial droop & BL neuropathy. VSS. Tele Afib w/ SVR. DD2 diet with TL. Up with A1, GB, & walker. Denies pain. Plan to discharge today 4/12  to ARU at 11am.

## 2018-04-12 NOTE — PLAN OF CARE
Problem: Patient Care Overview  Goal: Plan of Care/Patient Progress Review  Physical Therapy Discharge Summary    Reason for therapy discharge:    Discharged to acute rehabilitation facility.    Progress towards therapy goal(s). See goals on Care Plan in [facility] electronic health record for goal details.  Goals partially met.  Barriers to achieving goals:   discharge from facility.    Therapy recommendation(s):    Continued therapy is recommended.  Rationale/Recommendations:  Pt would benefit from intensive PT at ARU to progress mobility and balance so he can return home.

## 2018-04-12 NOTE — PLAN OF CARE
Problem: Craniotomy/Craniectomy/Cranioplasty (Adult)  Goal: Signs and Symptoms of Listed Potential Problems Will be Absent, Minimized or Managed (Craniotomy/Craniectomy/Cranioplasty)  Signs and symptoms of listed potential problems will be absent, minimized or managed by discharge/transition of care (reference Craniotomy/Craniectomy/Cranioplasty (Adult) CPG).   Outcome: Improving  3186-2869 A&Ox4, speech slightly thick. Neuros: L facial droop, baseline neuropathies to extremeties. VSS, SBP w/i parameters. Tele AFIB SVR. DD2 w/ TL diet. Staples to R head w/ dried drainage, THOMAS, intact. Up with 1, GB. K+ replaced, recheck this AM. Denies pain. Plan for probable d/c tomorrow.

## 2018-04-12 NOTE — PROGRESS NOTES
"Clinic Care Coordination Contact  Care Coordination Transition Communication    Clinical Data: Patient was hospitalized at [facility] from 4/7/18 to 4/12/18 due to \"unresponsiveness after a mechanical fall from bed\" which resulted in a diagnosis of \"Right frontotemporal parietal subdural hematoma with midline shift\".     Per chart, pt also has a history of \"alcoholism,amputation of left leg, anticardiolipin antibody syndrome, aortic stenosis, balance problems, cardiomyopathy, coronary artery disease,  hiatal hernia, hypercholesterolemia, essential hypertension, alcoholic liver disease and cirrhosis along with portal hypertension, low-grade B cell lymphoproliferative disorder, monoclonal gammopathy And pulmonary embolism in the past\".     Transition to Facility:              Facility Name: [facility]              Contact name and phone number/fax: Yue ([phone])    Writer talked with Yue [facility] , and requested that Yue update writer re: pt's disposition plans when pt is close to discharge from [facility].     Plan: SW Care Coordinator will await notification from facility staff informing SW Care Coordinator of patient's discharge plans/needs. SW Care Coordinator will review chart and outreach to facility staff every 4 weeks and as needed.     TREMAYNE Torrez  [facility] Care Coordination  Clinic: [facility]   Email: [email or web address]  Tele: [phone]            "

## 2018-04-12 NOTE — DISCHARGE SUMMARY
[facility]    Discharge Summary  Hospitalist    Date of Admission:  4/7/2018  Date of Discharge:  4/12/2018  Discharging Provider: Keshia Black MD    Discharge Diagnoses     Right Subdural hematoma 2/2 mechanical fall   Positive blood culture   Antiphospholipid antibody syndrome   Prior h/o  PE/DVT on long term anticoagulation  Paroxysmal atrial fibrillation  Chronic anemia   H/O alcohol use disorder   Alcoholic cirrhosis   Chronic systolic heart failure   Coronary artery disease s/p h/o CABG   Severe malnutrition  HLD  Hypertension  Low grade B cell lymphoproliperative disorder   Peripheral polyneuropathy      History of Present Illness   Antonio Ramirez is an 73 year old male with history of alcoholism,amputation of left leg, anticardiolipin antibody syndrome, aortic stenosis, balance problems, cardiomyopathy, coronary artery disease,  hiatal hernia, hypercholesterolemia, essential hypertension, alcoholic liver disease and cirrhosis along with portal hypertension, low-grade B cell lymphoproliferative disorder, monoclonal gammopathy And pulmonary embolism in the past.  who presented with unresponsiveness after a mechanical fall from the bed.  He was found to have subdural hematoma.  Please see admission H&P for details    Hospital Course   Antonio Ramirez was admitted on 4/7/2018.  The following problems were addressed during his hospitalization:    Right frontotemporal parietal subdural hematoma with midline shift 2/2 fall  - status post craniotomy   - At the time of arrival he was essentially unresponsive.  Head CT showed a subdural hematoma on the right with midline shift.  He was taken to the OR emergently for evacuation and underwent right frontoparietal craniotomy and evacuation of subdural hematoma on 4/7/2018.   -Postoperative CT with improvement of midline shift and continued improvement in hemiparesis/dysphagia and mentation  -Staples to be removed on 4/21/2018, may be done at the ARU  -  was on chronic anticoagulation for history of DVT and PE with history of antiphospholipid antibody syndromePTA, which is currently on hold.  Neurosurgery recommends holding it for at least 4 weeks until repeat CT is done an outpatient follow-up with neurosurgery before reinitiation of anticoagulation.  Patient already has a IVC filter in place.  In case of emergent need for anticoagulation prior to this, neurosurgery recommends initiation without bolus  - Seizure prophylaxis, keppra 500 mg BID, plan for total of 2 weeks  - PT/OT/ST following , recommending acute rehab where patient will be going for continuation of his rehabilitation  - Roxicodone was available PRN for pain management however patient has not needed any pain medication in the last 48 hours     antiphospholipid antibody syndrome, IVC filter in place, on chronic anticoagulation.  History of pulmonary embolism, deep vein thrombosis:   - Holding PTA warfarin and ASA; see above for plans regarding reinitiation of anticoagulation.  Patient had DVT/massive PE with saddle embolus about 4-5 years back and not in the recent past.  Already has a IVC filter.  Neurosurgery strictly recommending against reinitiation of anticoagulation until repeat CT shows stability unless absolutely necessary.  -Of note given his antiphospholipid antibody syndrome Coumadin dosing should be based on chromogenic factor 10 when appropriate to resume     Positive blood culture :  -His blood culture from admission was positive 1 out of 2 for coagulase-negative staph, likely contaminant.  Patient afebrile and no obvious focus of infection  -Repeat culture has been negative for last 48 hours, was monitored off antibiotics    Paroxysmal atrial fibrillation on chronic anticoagulation with noted slowed ventricular response: Pt received atropine post operatively for bradycardia HR 30s.  Patient currently on Coreg which is his medication PTA and has been tolerating.  PTA dose of Coreg of  "3.125 was increased to 6.25 given intermittent elevated blood pressure.  Patient tolerated his increased dose of Coreg during the hospital stay  - Holding PTA warfarin as noted above and should be initiated once cleared by neurosurgery \" Continuous telemetry ordered      Chronic anemia.  - Hemoglobin  baseline range 8.0-11  -Has been stable around 9-10.  No active signs of bleeding.        History of alcohol use disorder.  Alcoholic cirrhosis with ascites, portal hypertension.  Patient did not have any withdrawals during the hospital stay      HFrEF, EF 40-45%.  Cardiomyopathy.  Mild valvular aortic stenosis, moderate mitral regurgitation, mild to moderate tricuspid regurgitation, mild ascending aorta dilatation.  Coronary artery disease s/p 3-v CABG.  Thoracic aortic aneurysm without rupture.  - BNP on admission 4334  - Continue PTA Coreg  - Holding PTA ASA, warfarin as above  - Not on statin PTA due to intolerance  -Continue PTA torsemide.  Patient was euvolemic at the time of discharge      Hyperlipidemia.  - Does not tolerate statin therapy, not on PTA medication for above diagnosis; will continue to monitor at this time.      Essential hypertension.  - Current SBP goal <140, currently on Coreg and torsemide  - Continue  torsemide  -monitor and adjust medication as needed      Chronic kidney disease, stage 3.  - History of ANSELMO requiring HD in 2010  Renal function has been stable during his hospital stay, monitor intermittently, avoid nephrotoxins      Suspected urinary tract infection.  - U/A noted positive nitrites, large leukocyte esterase and >182 WBC  -Was on Ancef perioperatively.  Urine culture with 50,000- 100,000 CFU Klebsiella and patient currently asymptomatic, no leukocytosis  -Continued to monitor off antibiotics      Gastroesophageal reflux disease.  - Not on any PTA medications for above diagnosis, will continue to monitor at this time.      Low grade B cell lymphoproliferative disorder.  History " of prostate cancer s/p radiative seed implants.  - No acute issues, will continue to monitor.      Peripheral polyneuropathy.  - Holding PTA gabapentin at this time as patient gradually clearing on his mentation.  Patient asymptomatic currently.  Should be able to resume Neurontin once back to his baseline and if he starts having symptoms      # Discharge Pain Plan:   - Patient currently has NO PAIN and is not being prescribed pain medications on discharge.    Active Problems:    S/P craniotomy    Subdural hematoma (H)      Keshia Black MD    Significant Results and Procedures   For imaging see below  Evacuation of subdural hematoma    Pending Results   These results will be followed up by Rehab physician  Unresulted Labs Ordered in the Past 30 Days of this Admission     Date and Time Order Name Status Description    4/10/2018 2204 Blood culture Preliminary     4/10/2018 2204 Blood culture Preliminary     4/7/2018 1246 Blood culture Preliminary     4/7/2018 1246 Blood culture Preliminary           Code Status   Full Code       Primary Care Physician   Main Hardy    Physical Exam   Temp: 97.9  F (36.6  C) Temp src: Oral BP: 128/73 Pulse: 52 Heart Rate: 64 Resp: 16 SpO2: 95 % O2 Device: None (Room air)    Vitals:    04/10/18 0607 04/11/18 0028 04/12/18 0453   Weight: 88.3 kg (194 lb 9.6 oz) 85.6 kg (188 lb 12.8 oz) 83.4 kg (183 lb 12.8 oz)     Vital Signs with Ranges  Temp:  [97.9  F (36.6  C)-98.4  F (36.9  C)] 97.9  F (36.6  C)  Pulse:  [52-61] 52  Heart Rate:  [49-72] 64  Resp:  [16-20] 16  BP: (110-133)/(65-73) 128/73  SpO2:  [95 %-97 %] 95 %  I/O last 3 completed shifts:  In: 400 [P.O.:400]  Out: 375 [Urine:375]    Constitutional: Awake, alert and no distress. Appears comfortable.  Stitches on right scalp with surgical incision well approximated and clean  Head: Normocephalic. No masses, lesions, tenderness or abnormalities  ENT: no neck nodes or sinus tenderness  Cardiovascular: RRR.  No murmurs, no rub or  gallop no JVD  Respiratory: Normal WOB, no wheezes or crackles   Gastrointestinal: Abdomen soft, non-tender. BS normal. No masses, organomegaly  : Deferred  Extremities: Below knee amputation on left lower extremity, no edema on RLE, no clubbing /cyanosis   Skin: Warm and dry, no rash    Discharge Disposition   Discharged to short-term care facility, ARU  Condition at discharge: Stable    Consultations This Hospital Stay   PHYSICAL THERAPY ADULT IP CONSULT  OCCUPATIONAL THERAPY ADULT IP CONSULT  SPEECH LANGUAGE PATH ADULT IP CONSULT  SOCIAL WORK IP CONSULT  SMOKING CESSATION PROGRAM IP CONSULT  PHARMACY IP CONSULT  INTENSIVIST IP CONSULT  NEUROLOGY IP CONSULT  PHYSICAL THERAPY ADULT IP CONSULT  OCCUPATIONAL THERAPY ADULT IP CONSULT  PHYSICAL THERAPY ADULT IP CONSULT  PHYSICAL THERAPY ADULT IP CONSULT  OCCUPATIONAL THERAPY ADULT IP CONSULT  SPEECH LANGUAGE PATH ADULT IP CONSULT    Time Spent on this Encounter   IKeshia, personally saw the patient today and spent greater than 30 minutes discharging this patient.    Discharge Orders     AntiEmbolism Stockings   Bilateral below knee length.On in the morning, off at night     CT Head w/o contrast*     Wound care (specify)   Site:   head  Instructions:  Keep your incision clean and dry at all times.  OK to remove dressing on postop day 2.  OK to shower on postop day 3 and allow water to run over incision, pat dry after shower.  No bathing swimming or submerging in water.  Call immediately or come to ED if any drainage occurs, or you develop new pain, redness, or swelling.     Activity - Up ad kalyani     Follow Up and recommended labs and tests   [facility] for staple removal (may be done at ARU).  Also follow up in 4 weeks with repeat CT of head.  Please call the clinic at [phone] to schedule your appointment with Juliette Silva CNP or Gracy Hall CNP.     General info for SNF   Length of Stay Estimate: Short  Term Care: Estimated # of Days <30  Condition at Discharge: Improving  Level of care:skilled   Rehabilitation Potential: Good  Admission H&P remains valid and up-to-date: Yes  Recent Chemotherapy: N/A  Use Nursing Home Standing Orders: Yes     Mantoux instructions   Give two-step Mantoux (PPD) Per Facility Policy Yes     Reason for your hospital stay   ICH     Daily weights   Call Provider for weight gain of more than 2 pounds per day or 5 pounds per week.     Intake and output   Every shift     Follow Up and recommended labs and tests   Follow up with ARU  physician.  The following labs/tests are recommended: CT head in 4 weeks.  FU with Neurosurgery in 4 weeks with head CT     Additional Discharge Instructions   No coumadin/anticoagulants or ASA / NSAIDs for 4 weeks, until CT head done and okayed by Neurosurgery if stable head CT   In case of absolute need for anticoagulation secondary to new event , Neurosurgery recommends anticoagulation wothout bolus ( patient already has IVC)     Activity - Up with assistive device     Activity - Up with nursing assistance     Full Code     Physical Therapy Adult Consult   Evaluate and treat as clinically indicated.    Reason:  Intracranial hemorrhage     Occupational Therapy Adult Consult   Evaluate and treat as clinically indicated.    Reason:  Intracranial hemorrhage     Speech Language Path Adult Consult   Evaluate and treat as clinically indicated.    Reason:  Intracranial hemorrhage     Fall precautions     Pneumatic Compression Device    Bilateral calf. Remove 30 mins BID.     Advance Diet as Tolerated   Follow this diet upon discharge: Orders Placed This Encounter     Snacks/Supplements Adult: Other - Please comment; Vanilla Magic Shake; Between Meals     Combination Diet Dysphagia Diet Level 2: Mechan Altered; Thin Liquids (water, ice chips, juice, milk gelatin, ice cream, etc)       Discharge Medications   Current Discharge Medication List      START taking these  "medications    Details   levETIRAcetam (KEPPRA) 500 MG tablet Take 1 tablet (500 mg) by mouth 2 times daily for 14 days  Refills: 0    Associated Diagnoses: Subdural hemorrhage (H)         CONTINUE these medications which have CHANGED    Details   carvedilol (COREG) 6.25 MG tablet Take 1 tablet (6.25 mg) by mouth 2 times daily  Qty: 60 tablet    Associated Diagnoses: Coronary artery disease involving native heart with angina pectoris, unspecified vessel or lesion type (H)         CONTINUE these medications which have NOT CHANGED    Details   torsemide (DEMADEX) 20 MG tablet Take 20 mg by mouth daily         STOP taking these medications       traMADol (ULTRAM) 50 MG tablet Comments:   Reason for Stopping:         warfarin (COUMADIN) 5 MG tablet Comments:   Reason for Stopping:         gabapentin (NEURONTIN) 600 MG tablet Comments:   Reason for Stopping:         potassium chloride SA (K-DUR/KLOR-CON M) 10 MEQ CR tablet Comments:   Reason for Stopping:         ASPIRIN EC PO Comments:   Reason for Stopping:             Allergies   Allergies   Allergen Reactions     Hmg-Coa-R Inhibitors Other (See Comments)     Rhabdomyolysis occurred within a couple days     Ciprofloxacin Itching     Severe itching \"like ants were crawling\"     Data   Most Recent 3 CBC's:  Recent Labs   Lab Test  04/12/18   0730  04/11/18   0807  04/10/18   0815   WBC  5.5  5.9  6.2   HGB  10.3*  10.5*  9.7*   MCV  80  80  81   PLT  137*  156  131*      Most Recent 3 BMP's:  Recent Labs   Lab Test  04/12/18   0730  04/12/18   0245  04/11/18   1830  04/11/18   0807  04/10/18   0815   NA  139   --    --   140  143   POTASSIUM  3.9  3.8  3.3*  2.9*  3.4   CHLORIDE  107   --    --   107  111*   CO2  24   --    --   23  22   BUN  16   --    --   14  15   CR  0.90   --    --   0.88  0.93   ANIONGAP  8   --    --   10  10   BENNIE  8.7   --    --   8.8  8.8   GLC  87   --    --   113*  89     Most Recent 2 LFT's:  Recent Labs   Lab Test  04/07/18   1240  " 01/28/18   2247   AST  30  29   ALT  17  14   ALKPHOS  93  96   BILITOTAL  2.2*  0.8     Most Recent INR's and Anticoagulation Dosing History:  Anticoagulation Dose History     Recent Dosing and Labs Latest Ref Rng & Units 12/26/2017 1/28/2018 1/29/2018 4/7/2018 4/8/2018 4/10/2018 4/11/2018    INR 0.86 - 1.14 - 3.61(H) - 1.96(H) 1.37(H) 1.58(H) 1.39(H)    Chromogenic Factor 10 70 - 130 % 23(L) - 20(L) - - - -        Most Recent 3 Troponin's:  Recent Labs   Lab Test  04/07/18   1240  01/29/18   0640  01/29/18   0310   TROPI  <0.015  <0.015  0.017     Most Recent Cholesterol Panel:No lab results found.  Most Recent 6 Bacteria Isolates From Any Culture (See EPIC Reports for Culture Details):  Recent Labs   Lab Test  04/10/18   2230  04/10/18   2220  04/07/18   1754  04/07/18   1310  04/07/18   1250  12/06/17   1159   CULT  No growth after 1 day  No growth after 1 day  50,000 to 100,000 colonies/mL  Klebsiella pneumoniae  *  Cultured on the 3rd day of incubation:  Gram positive cocci in clusters  *  Critical Value/Significant Value, preliminary result only, called to and read back by  Farida Escobedo RN/SH73 at 2128 on 4/10/18. EH.    Susceptibility testing in progress  (Note)  POSITIVE for Staphylococci other than S.aureus, S.epidermidis and  S.lugdunensis, by [employer] multiplex nucleic acid test.  Coagulase-negative staphylococci are the most common venipuncture or  collection associated skin CONTAMINANTS grown in blood cultures.  Final identification and antimicrobial susceptibility testing will be  verified by standard methods.    Specimen tested with Verigene multiplex, gram-positive blood culture  nucleic acid test for the following targets: Staph aureus, Staph  epidermidis, Staph lugdunensis, other Staph species, Enterococcus  faecalis, Enterococcus faecium, Streptococcus species, S. agalactiae,  S. anginosus grp., S. pneumoniae, S. pyogenes, Listeria sp., mecA  (methicillin resistance) and Rad/B (vancomycin  resistance).    Critical Value/Significant Value called to and read back by TAMAR WALLACE RN @0005 4/11/18. SCG      No growth after 5 days  No anaerobes isolated  No growth     Most Recent TSH, T4 and A1c Labs:No lab results found.  Results for orders placed or performed during the hospital encounter of 04/07/18   CT Head w/o Contrast     Value    Radiologist flags Intracranial hemorrhage (AA)    Narrative    CT OF THE HEAD WITHOUT CONTRAST 4/7/2018 1:21 PM     COMPARISON: Head CT 1/28/2018.    HISTORY: Fall, on coumadin.    TECHNIQUE: 5 mm thick axial CT images of the head were acquired  without IV contrast material.      Impression    IMPRESSION: There is a new mixed density subdural collection along the  right cerebral convexity measuring 2.7 cm in maximum thickness  resulting in at least 1.9 cm of leftward midline shift of the septum  pellucidum, subfalcine herniation of the right frontal lobe towards  the left, and probable transtentorial herniation bilaterally. This  most likely represents a mixed age subdural hematoma.    The ventricles and basal cisterns are within normal limits in  configuration given the degree of cerebral volume loss. No mass or  recent infarct. There is mild diffuse cerebral volume loss.    The visualized paranasal sinuses are well-aerated. There is no  mastoiditis. There are no fractures of the visualized bones.    IMPRESSION:   1. New large subdural collection along the right cerebral convexity  resulting in severe left or midline shift of the septum pellucidum,  subfalcine herniation of the right frontal lobe towards the left, and  probable bilateral transtentorial herniation.   2. Diffuse cerebral volume loss and cerebral white matter changes  consistent with chronic small vessel ischemic disease.      [Critical Result: Intracranial hemorrhage]    Finding was identified on 4/7/2018 1:26 PM.     HARESH SORIA was contacted by Dr. Ball on 4/7/2018 1:26 PM  and verbalized  understanding of the critical result.     Radiation dose for this scan was reduced using automated exposure  control, adjustment of the mA and/or kV according to patient size, or  iterative reconstruction technique    TODD CRENSHAW MD   XR Chest Port 1 View    Narrative    CHEST ONE VIEW PORTABLE   4/7/2018 5:45 PM     HISTORY: Endotracheal tube positioning.     COMPARISON: None.      Impression    IMPRESSION: An endotracheal tube is in place, with tip approximately 5  cm above the akin. Opacity at the left lung base may represent  atelectasis or infection. Prior midline sternotomy. Aortic  calcification.    KACI BAUMANN MD   CT Head w/o Contrast    Narrative    CT SCAN OF THE HEAD WITHOUT CONTRAST   4/7/2018 9:06 PM     HISTORY: Status post craniotomy for subdural hematoma.    TECHNIQUE:  Axial images of the head and coronal reformations without  IV contrast material. Radiation dose for this scan was reduced using  automated exposure control, adjustment of the mA and/or kV according  to patient size, or iterative reconstruction technique.    COMPARISON: 4/7/2018 at 1315 hours.    FINDINGS: Right lateral subdural fluid collection has been evacuated  via a right parietal craniotomy. Air is seen in the subdural space  along with the drain and skin staples are seen over the craniotomy. A  small amount of layering blood remains in the posterior aspect of the  subdural space. There is persistent mass effect with persistent shift  of midline structures to the left by 1.5 cm, although this is less  than on the previous exam, and left lateral ventricle no longer  appears as trapped as it did earlier.    There is a small subdural fluid collection now present anterior to the  left frontal lobe inferiorly about 0.7 cm thick. No intraparenchymal  or intraventricular bleeding is seen.      Impression    IMPRESSION:  1. Status post evacuation of right lateral subdural fluid collection  with expected postoperative  changes.  2. Decrease in midline shift and decrease in trapping of left lateral  ventricle.  3. Small new subdural fluid collection anterior to left frontal lobe.  Continued surveillance is warranted.    FEDERICO LI MD   CT Head w/o Contrast    Narrative    CT SCAN OF THE HEAD WITHOUT CONTRAST April 8, 2018 9:05 AM     HISTORY: Pneumocephalus, evaluation of evolution of shift; status post  evacuation of right subdural hematoma.    TECHNIQUE: Axial images of the head and coronal reformations without  IV contrast material. Radiation dose for this scan was reduced using  automated exposure control, adjustment of the mA and/or kV according  to patient size, or iterative reconstruction technique.    COMPARISON: 4/7/2018.    FINDINGS: Mass effect continues to decrease, with midline structures  only 1.0 cm deviated to the left. The layering blood products in the  posterior aspect of what remains of the right lateral subdural fluid  collection have become higher in attenuation and thus likely indicate  clotting blood. There has been partial resorption of air within the  subdural space anteriorly. Postoperative changes from the right  lateral craniotomy and overlying skin staples and drain in the  subdural space are unchanged.      Impression    IMPRESSION: Evolving postoperative changes after evacuation of right  lateral subdural hematoma with decreased mass effect.    FEDERICO LI MD

## 2018-04-13 LAB
ANION GAP SERPL CALCULATED.3IONS-SCNC: 8 MMOL/L (ref 3–14)
BACTERIA SPEC CULT: ABNORMAL
BACTERIA SPEC CULT: NO GROWTH
BUN SERPL-MCNC: 17 MG/DL (ref 7–30)
CALCIUM SERPL-MCNC: 9 MG/DL (ref 8.5–10.1)
CHLORIDE SERPL-SCNC: 106 MMOL/L (ref 94–109)
CO2 SERPL-SCNC: 27 MMOL/L (ref 20–32)
CREAT SERPL-MCNC: 0.95 MG/DL (ref 0.66–1.25)
GFR SERPL CREATININE-BSD FRML MDRD: 77 ML/MIN/1.7M2
GLUCOSE SERPL-MCNC: 88 MG/DL (ref 70–99)
Lab: ABNORMAL
Lab: NORMAL
POTASSIUM SERPL-SCNC: 3.7 MMOL/L (ref 3.4–5.3)
SODIUM SERPL-SCNC: 141 MMOL/L (ref 133–144)
SPECIMEN SOURCE: ABNORMAL
SPECIMEN SOURCE: NORMAL

## 2018-04-13 PROCEDURE — 40000133 ZZH STATISTIC OT WARD VISIT: Performed by: OCCUPATIONAL THERAPIST

## 2018-04-13 PROCEDURE — 40000193 ZZH STATISTIC PT WARD VISIT

## 2018-04-13 PROCEDURE — 97162 PT EVAL MOD COMPLEX 30 MIN: CPT | Mod: GP

## 2018-04-13 PROCEDURE — 92610 EVALUATE SWALLOWING FUNCTION: CPT | Mod: GN

## 2018-04-13 PROCEDURE — 36415 COLL VENOUS BLD VENIPUNCTURE: CPT | Performed by: PHYSICAL MEDICINE & REHABILITATION

## 2018-04-13 PROCEDURE — A9270 NON-COVERED ITEM OR SERVICE: HCPCS | Mod: GY | Performed by: PHYSICAL MEDICINE & REHABILITATION

## 2018-04-13 PROCEDURE — 97530 THERAPEUTIC ACTIVITIES: CPT | Mod: GO | Performed by: OCCUPATIONAL THERAPIST

## 2018-04-13 PROCEDURE — 97530 THERAPEUTIC ACTIVITIES: CPT | Mod: GP

## 2018-04-13 PROCEDURE — 92526 ORAL FUNCTION THERAPY: CPT | Mod: GN

## 2018-04-13 PROCEDURE — 40000225 ZZH STATISTIC SLP WARD VISIT

## 2018-04-13 PROCEDURE — 97535 SELF CARE MNGMENT TRAINING: CPT | Mod: GO | Performed by: OCCUPATIONAL THERAPIST

## 2018-04-13 PROCEDURE — 80048 BASIC METABOLIC PNL TOTAL CA: CPT | Performed by: PHYSICAL MEDICINE & REHABILITATION

## 2018-04-13 PROCEDURE — 25000132 ZZH RX MED GY IP 250 OP 250 PS 637: Mod: GY | Performed by: PHYSICAL MEDICINE & REHABILITATION

## 2018-04-13 PROCEDURE — 97165 OT EVAL LOW COMPLEX 30 MIN: CPT | Mod: GO | Performed by: OCCUPATIONAL THERAPIST

## 2018-04-13 PROCEDURE — 12800006 ZZH R&B REHAB

## 2018-04-13 RX ADMIN — FLUTICASONE PROPIONATE 2 SPRAY: 50 SPRAY, METERED NASAL at 08:07

## 2018-04-13 RX ADMIN — LEVETIRACETAM 500 MG: 500 TABLET, FILM COATED ORAL at 08:08

## 2018-04-13 RX ADMIN — CARVEDILOL 6.25 MG: 6.25 TABLET, FILM COATED ORAL at 08:08

## 2018-04-13 RX ADMIN — TORSEMIDE 20 MG: 10 TABLET ORAL at 08:08

## 2018-04-13 RX ADMIN — FLUTICASONE PROPIONATE 2 SPRAY: 50 SPRAY, METERED NASAL at 20:16

## 2018-04-13 RX ADMIN — GABAPENTIN 600 MG: 600 TABLET, FILM COATED ORAL at 08:08

## 2018-04-13 RX ADMIN — LEVETIRACETAM 500 MG: 500 TABLET, FILM COATED ORAL at 20:17

## 2018-04-13 RX ADMIN — GABAPENTIN 600 MG: 600 TABLET, FILM COATED ORAL at 20:17

## 2018-04-13 NOTE — PLAN OF CARE
Problem: Patient Care Overview  Goal: Plan of Care/Patient Progress Review  Pt alert and oriented x4. VSS. Neuros intact. No apparent facial droop, speech clear today. Tele afib with CVR. CMS with baseline numbness in hands and foot. Denies pain. Incision on R side of head WDL with intact staples, THOMAS. LBKA with prosthetic. Ambulating with asst of 1 GB and walker. Reviewed written d/c instructions, including follow up appts, with pt and his spouse. All questions answered. Pt d/lexie to [facility] with spouse providing transportation.

## 2018-04-13 NOTE — PLAN OF CARE
Problem: PT General Care Plan  Goal: PT target date for goal attainment  Discharge Planner PT   Patient plan for discharge: Home with spouse  Current status: Eval complete. Pt required much encouragement to participate in PT initially d/t fatigue and pt appearing to be down about current situation. Pt able to amb 275 ft x2 with FWW and close CGA, demonstrating intermittent L and posterior swaying but no overt LOBs. Pt reports to feel very fatigued today and required frequent rest breaks. Anticipate 7 days LOS.  Barriers to return to prior living situation: impaired balance and at increased risk for falls with all OOB mobility, poor activity tolerance  Recommendations for discharge: Home with spouse-possible OP PT to progress higher level endurance and strength training  Rationale for recommendations: Pt does not have barriers at home and spouse is home all day and able to assist as needed once pt is at least SBA for mobility       Entered by: Anuja Michael 04/13/2018 12:24 PM

## 2018-04-13 NOTE — PLAN OF CARE
Problem: Patient Care Overview  Goal: Plan of Care/Patient Progress Review  Discharge Planner OT   Patient plan for discharge: Home with assist  Current status: OT eval completed and tx initiated. Pt unable to independently recall surgical precautions - writer provided further education on precautions, impact on ADLs/activity, and compensatory strategies for tasks. Pt required CGA with functional transfers using FWW - needs cues for safety; able to complete functional mobility to/from rehab gym with close SBA using FWW. Pt with significant STM deficits and impaired attention - will need further cognitive evaluation.  Barriers to return to prior living situation: weakness, impaired balance, pain, surgical precautions, cognitive deficits  Recommendations for discharge: Home with OP OT - pt will likely need assist for IADLs at discharge due to cognitive deficits  Rationale for recommendations: to increase pt's safety and (I) with ADL/IADLs       Entered by: Clarice Avila 04/13/2018 12:50 PM

## 2018-04-13 NOTE — PROGRESS NOTES
PM&R Daily Note:    73-year-old with fall and subdural hematoma status post surgical evacuation.  History of antiphospholipid antibody syndrome prior blood clots for which he is chronically on warfarin, also has inferior vena cava filter.  Has past left below-knee amputation.  Admitted to inpatient acute rehab 4/12/18 for conference of cares.    Just starting full therapy evaluations, thus far participating well.  He does have cognitive deficits and he may not be fully appreciating these.  He is intelligent and active at baseline as a retired .  He does have gait instability and if they want someone with him still for transfers and mobilization.  Still with dysphagia level 2 diet with thin liquids.  Advance per speech language therapy.    Question of some bleeding from his left BKA last revised in December 2017.  Was seen by wound ostomy nurse today, appreciate insights.  There are some deeper retained sutures that are working towards the surface.  To came off with gentle wiping using gauze and apparently a few others apparent below the skin level.  Monitor these.  If there is drainage Mepilex can be used.  Have prosthesis off overnight.    Medically:  Continue off anticoagulation because of subdural hematoma  Blood pressure is reasonably well-controlled, continues carvedilol 6.25 mg twice daily, torsemide 20 mg daily.  Pain: Not having headaches continue acetaminophen as needed.  Has baseline gabapentin 600 mg twice daily.    Will refine plan of care and estimated length of stay after a few more days of therapy.  Tentatively about 10 day stay.    Vitals:    04/12/18 1153 04/12/18 1500 04/13/18 0015 04/13/18 0734   BP: 114/60 116/47 129/58 127/64   BP Location:  Right arm Right arm Left arm   Pulse: 60 54 55 50   Resp: 16 14 16    Temp: 96  F (35.6  C) 96.7  F (35.9  C) 96.9  F (36.1  C) 95.8  F (35.4  C)   TempSrc: Oral Oral Oral Oral   SpO2: 99% 99% 96% 98%   Weight: 86 kg (189 lb 8 oz)      Height: 1.854 m  "(6' 1\")        Alert pleasant, scalp staples on right, no drainage  Face symmetric, breathing easy no cough or wheeze  Wearing left BKA prosthesis, I did not remove but did review with the wound ostomy nurse notes as above.  Does ambulate with prosthesis though still close contact guard to min assist from therapist.    "

## 2018-04-13 NOTE — PROGRESS NOTES
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 S/p subdural hematome evacuation on 4/7/2018.     WOC Consult to evaluate and treat possible pressure injury to distal end of left BKA.    I/A: Admission assessment notes blackened areas on the distal end of the stump. Per patient's wife, patient had a stump revision in December 2017 and continues to have deep sutures make their way to the skin surface. Per her assessment, the dark areas noted by nursing are similar to the sutures she has noted in the past.    Left lower leg prosthesis removed and stump assessed. One black pinpoint area noted to the left medial stump and three areas noted on the left lateral stump. 4x4 gauze run over the surface which resulted in removal of two sutures- one from the medial and one from the lateral stump. There appear to be two more sutures just below the skin surface on the left lateral side.     P: Encourage patient to remove prosthesis when sleeping- per wife, he often will not initiate this on his own. No dressing indicated but OK to place Mepilex for protection.    WOC will sign off. Please re-consult with questions or concerns.    Shirley Alonzo RN, CWOCN

## 2018-04-13 NOTE — PROGRESS NOTES
04/13/18 1200   General Information   Onset Date 04/07/18   Start of Care Date 04/13/18   Referring Physician Dr. Kiser   Patient/Family Goals Statement Get home, return to regular diet   Swallowing Evaluation Bedside swallow evaluation   Mode of current nutrition Oral diet   Type of oral diet Dysphagia diet level 2;Thin liquid   Respiratory Status Room air   Comments Antonio Ramirez is a 73 year old ambidextrious male who was admitted on 4/7/2018 with a right subdural hematoma, sustained after a fall.  PMHx of with a complex antiphospholipid antibody syndrome s/p IVC filter placement and previously on coumadin, including alcohol abuse, left BKA, balance problems, and cardiomyopathy.  Pt was evaluated on 4/8 after extubation, placed on full liquid, honey-thick diet, later advanced to NDD-2 with thin liquids.  Referred by MD for ongoing dysphagia tx and advancement of diet.    Clinical Swallow Evaluation   Oral Musculature generally intact   Structural Abnormalities none present   Dentition present and adequate   Mucosal Quality good   Mandibular Strength and Mobility intact   Oral Labial Strength and Mobility WFL   Lingual Strength and Mobility WFL   Velar Elevation intact   Buccal Strength and Mobility intact   Laryngeal Function Cough;Throat clear;Voicing initiated   Oral Musculature Comments Within function limits   Clinical Swallow Eval: Thin Liquid Texture Trial   Mode of Presentation, Thin Liquids cup;straw   Volume of Liquid or Food Presented Single sips, sequential sips   Oral Phase of Swallow WFL   Pharyngeal Phase of Swallow throat clearing;coughing/choking   Diagnostic Statement 1x hard cough on thin by straw while reclined, recommend no straws, upright for PO intake.  2x throat clears after thin liquid.  Pt observed to take large sips on occasion, somewhat impulsive.    Clinical Swallow Eval: Solid Food Texture Trial   Mode of Presentation, Solid self-fed   Volume of Solid Food Presented Single  bites   Oral Phase, Solid other (see comments)  (Additional time for oral phase)   Oral Residue, Solid left anterior lateral sulci   Pharyngeal Phase, Solid intact   Diagnostic Statement Mild oral residue, cues to clear left side. No s/sx of aspiration with solid textures.  Wet vocal quality observed, unsure if attributed to thin liquids previously consumed or solid textures.   Swallow Eval: Clinical Impressions   Skilled Criteria for Therapy Intervention Skilled criteria met.  Treatment indicated.   Functional Assessment Scale (FAS) 5   Dysphagia Outcome Severity Scale (OMID) Level 5 - OMID   Treatment Diagnosis Mild oropharyngeal dysphagia    Diet texture recommendations Dysphagia diet level 2;Thin liquids   Recommended Feeding/Eating Techniques hard swallow w/ each bite or sip;maintain upright posture during/after eating for 30 mins;no straws;small sips/bites   Therapy Frequency daily   Predicted Duration of Therapy Intervention (days/wks) 10 days   Anticipated Discharge Disposition home w/ outpatient services   Risks and Benefits of Treatment have been explained. Yes   Patient, family and/or staff in agreement with Plan of Care Yes   Clinical Impression Comments Patient presents with mild oropharyngeal dysphagia.  Throat clears and 1x cough observed during meal,  as well as intermittant wet vocal quality across meal.  Patient should be upright for all PO, no straws, and small sips to cautiously proceed with thin liquids.  Mildly prolonged oral phase with minimal residue, tolerating NDD-2 items. Pt is not currently at personal baseline and would benefit from skilled intervention to advance diet as approriate.    Total Evaluation Time   Total Evaluation Time (Minutes) 40

## 2018-04-13 NOTE — PROVIDER NOTIFICATION
"Social Work: Initial Assessment with Discharge Plan    Patient Name: Antonio Ramirez  : 1945  Age: 73 year old  MRN: [id number]  Completed assessment with: Patient   Admitted to ARU: 18    Presenting Information   Date of SW assessment: 2018  Health Care Directive: None filed.   Primary Health Care Agent: Pt's wife (Helena Ramirez) per  NO policy.   Secondary Health Care Agent: NA   Living Situation: Apartment with spouse   Previous Functional Status: Independent   DME available: Therapies to assess.   Patient and family understanding of hospitalization: Restorative   Cultural/Language/Spiritual Considerations: English-speaking. 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 He was a  for civil law.     Physical Health  Reason for admission: No diagnosis found.    Provider Information   Primary Care Physician:Main Hardy at [facility].   : Pt has SW CC at [facility] (Meenu Story, Ph. [phone]). Pt didn't was not aware of this person.     Mental Health/Chemical Dependency:   Diagnosis: None listed in Problem List; pt also denied.    Alcohol/Tobacco/Narcotis: Pt discussed etoh history and that he currently drinks. He acknowledged that it has been an issue about which he has had ongoing conversations with his doctors. Pt denied drug or tobacco use.   Support/Services in Place: Pt has previously attended treatment (~3 yrs ago) but denies any current services/supports. He reports that treatment didn't work for him because he could never say that he was \"powerless\" or \"admit to being an alcoholic.\" Discussed range of resources for eliminating or reducing use (IP, OP, AA, individual counseling, harm reduction models, etc.). He was open in discussion but not interested in any resources at this time. Noted that SW is available if he changes his mind and would like more info.   Services Needed/Recommended: Would recommend continued " "discussions with care team about alcohol use as appropriate. Could benefit from individual counseling to discuss alcohol use should he be more receptive to this than a traditional tx model.   Sexuality/Intimacy: Did not address.     Support System  Marital Status: Pt is . Wife: Helena  Family support: Pt has two adult children. He reports that his daughter and daughter-in-law will be available as supports at d/c and his son \"as a last resort\". He reports that his wife will be a continued support and described her as an \"margot\".  Other support available: Unknown    Community Resources  Current in home services: None  Previous services: Unknown     Financial/Employment/Education  Employment Status: Pt is a retired . Has been retired for 12-13 years. Pt's wife does not work.   Income Source: Retired  Education: Graduate level  Financial Concerns:  None identified.   Insurance: Medicare & BCBS    Discharge Plan   Patient and family discharge goal: Return home  Provided Education on discharge plan: YES  Patient agreeable to discharge plan:  YES  Provided education and attained signature for Medicare IM and IRF Patient Rights and Privacy Information provided to patient: Yes   Provided patient with [place] [facility] [place] Resources: NA   Barriers to discharge: Medical stability, progression with therapies     Discharge Recommendations   Disposition: Home with continued family support   Transportation Needs: Family will transport   Name of Transportation Company and Phone: NA     Additional comments   Met with pt at the bedside; introduced self and role of SW. Obtained info above. Pt is a 73-year-old male who resides in an apartment in [place] with his wife. Pt/family goal is for pt to return home with continued family support. Team working towards a safe d/c plan.     Please invite to Care Conference:  Wife: Helena Ramirez (Ph. [phone])        04/13/18 1400   Living Arrangements   Lives With spouse "   Living Arrangements apartment   Discharge Needs Assessment   Transportation Available car;family or friend will provide   Coping/Stress   Major Change/Loss/Stressor chemical dependency/abuse       Yue Albert, CRISTOBAL, [facility]  - Acute Rehabilitation Center  Pager: [phone]  [email or web address]     NO LETTER

## 2018-04-13 NOTE — PROGRESS NOTES
04/13/18 0825   Quick Adds   Type of Visit Initial PT Evaluation   Living Environment   Lives With spouse   Living Arrangements apartment   Home Accessibility grab bars present (bathtub);bed and bath on same level   Number of Stairs to Enter Home 0   Number of Stairs Within Home 0   Transportation Available car;family or friend will provide   Living Environment Comment Pt has elevator in apartment- to access apartment   Self-Care   Dominant Hand ambidextrous   Usual Activity Tolerance good   Current Activity Tolerance fair   Regular Exercise no   Exercise Amount/Frequency 2 times/wk   Equipment Currently Used at Home grab bar;cane, straight;walker, rolling   Activity/Exercise/Self-Care Comment Pt reports doing pilates 2x/week, pt IND with self cares   Functional Level Prior   Ambulation 1-->assistive equipment   Transferring 1-->assistive equipment   Toileting 1-->assistive equipment   Bathing 1-->assistive equipment   Dressing 0-->independent   Eating 0-->independent   Communication 0-->understands/communicates without difficulty   Swallowing 0-->swallows foods/liquids without difficulty   Cognition 1 - attention or memory deficits   Fall history within last six months yes   Number of times patient has fallen within last six months 2   Which of the above functional risks had a recent onset or change? ambulation   Prior Functional Level Comment Pt reports using cane to mobilize more recently d/t R hip pain. Pt also has FWW and uses occasionally.    General Information   Onset of Illness/Injury or Date of Surgery - Date 04/07/18   Referring Physician Coy Us MD   Patient/Family Goals Statement to be able to walk without walker and go home as soon as possible   Pertinent History of Current Problem (include personal factors and/or comorbidities that impact the POC) Antonio Ramirez is a 73 year old male with a PMHx of with a complex PMH antiphospholipid antibody syndrome s/p IVC filter placement and previously  on coumadin, including alcohol abuse, left BKA, balance problems, and cardiomyopathy who presented to the ED via EMS after a fall on 4/7/18. Patient had refused to be taken to the hospital via EMS after initial evaluation. Patient began to get increasingly lethargic and wife called EMS again to transport patient to the ER. He was essentially unresponsive at this time. Head CT showed a subdural hematoma on the right with a leftward shift. Patient was taken to the OR for evacuation and underwent craniotomy.   Precautions/Limitations fall precautions   Weight-Bearing Status - LUE partial weight-bearing (% in comments)  (no lifting more than 10 lbs)   Weight-Bearing Status - RUE partial weight-bearing (% in comments)  (no lifting more than 10 lbs)   Weight-Bearing Status - LLE full weight-bearing   Weight-Bearing Status - RLE full weight-bearing   General Observations cranial precautions   Cognitive Status Examination   Orientation orientation to person, place and time   Level of Consciousness alert   Follows Commands and Answers Questions 100% of the time   Personal Safety and Judgment intact   Memory intact   Pain Assessment   Patient Currently in Pain No   Integumentary/Edema   Integumentary/Edema no deficits were identifed   Posture    Posture Forward head position;Protracted shoulders   Range of Motion (ROM)   ROM Comment WFL   Strength   Strength Comments BLEs demonstrates 4/5 strength grossly-slight weakness d/t decreased mobility for the past several weeks   ARC Assessment Only   Acute Rehab FIM See FIM scores for Mobility/ADL Assessment   Balance   Balance Comments normal sitting balance with no back support. Pt requires BUE support when standing d/t impaired static/dynamic standing balance   Sensory Examination   Sensory Perception Comments Pt reports baseline neuropathy in hands and feet   Coordination   Coordination no deficits were identified   Muscle Tone   Muscle Tone no deficits were identified   General  Therapy Interventions   Planned Therapy Interventions balance training;bed mobility training;gait training;progressive activity/exercise;home program guidelines;risk factor education;transfer training;strengthening;stretching;neuromuscular re-education   Clinical Impression   Criteria for Skilled Therapeutic Intervention yes, treatment indicated   PT Diagnosis Impaired functional mobility   Influenced by the following impairments impaired balance, decreased  BLE strength, lethargy   Functional limitations due to impairments transfers, gait, stairs   Clinical Presentation Evolving/Changing   Clinical Presentation Rationale Per pts PMHx and current medical and functional status   Clinical Decision Making (Complexity) Moderate complexity   Therapy Frequency` daily  (60 min/day)   Predicted Duration of Therapy Intervention (days/wks) 1 week   Anticipated Discharge Disposition Home;Home with Assist;Home with Outpatient Therapy   Risk & Benefits of therapy have been explained Yes   Patient, Family & other staff in agreement with plan of care Yes   Clinical Impression Comments will require 7 days at ARU   Total Evaluation Time   Total Evaluation Time (Minutes) 30

## 2018-04-13 NOTE — PLAN OF CARE
Problem: Goal/Outcome  Goal: Goal Outcome Summary  Pt is alert and oriented x 4, he denies pain or discomfort. He was incontinent of urine and stool one time early in the morning, he needed a total assist for cleaning and clothing management. [facility] nurse was here to assess the left stump,  She did not think it was pressure ulcer, see her note from today.We will continue to assist with activity of daily livings.

## 2018-04-13 NOTE — PLAN OF CARE
FOCUS/GOAL  Bowel management, Bladder management, Skin integrity and Cognition/Memory/Judgment/Problem solving    ASSESSMENT, INTERVENTIONS AND CONTINUING PLAN FOR GOAL:  Pt is lethargic in answering questions, but oriented, denied SOB, pain, or new changes in sensation, VSS, reported neopathy at baseline, using urinal at bedside to void during the night, slept through the night, Mepilex on left BKA, processus in room, incision on right side of head scabbing with staples intact, no further care concerns at this time continue to monitor wound on LLE.

## 2018-04-13 NOTE — PROGRESS NOTES
04/13/18 0922   Quick Adds   Type of Visit Initial Occupational Therapy Evaluation   Living Environment   Lives With spouse   Living Arrangements apartment   Number of Stairs to Enter Home 0  (has elevator)   Number of Stairs Within Home 0   Transportation Available car;family or friend will provide   Living Environment Comment Pt's spouse is retired and can assist as needed; apt has walk in shower   Self-Care   Dominant Hand ambidextrous   Usual Activity Tolerance good   Current Activity Tolerance moderate   Regular Exercise yes   Activity/Exercise Type (pilates)   Exercise Amount/Frequency 2 times/wk   Equipment Currently Used at Home grab bar;cane, straight;raised toilet;walker, rolling;wheelchair, manual;shower chair   Activity/Exercise/Self-Care Comment Pt recently started to use cane due to R hip pain - did not need all the time (dependent on pain)   Functional Level Prior   Ambulation 1-->assistive equipment   Transferring 1-->assistive equipment   Toileting 1-->assistive equipment   Bathing 1-->assistive equipment   Dressing 0-->independent   Eating 0-->independent   Communication 0-->understands/communicates without difficulty   Swallowing 0-->swallows foods/liquids without difficulty   Cognition 0 - no cognition issues reported   Fall history within last six months yes   Number of times patient has fallen within last six months 2   Which of the above functional risks had a recent onset or change? ambulation;transferring;toileting;bathing;dressing;fall history;cognition   Prior Functional Level Comment Pt was (I) with all most ADL/IADLs including driiving; pt's spouse does manage financial matters (pt still does his own meds) and most of the cooking/cleaning; pt is retired   General Information   Onset of Illness/Injury or Date of Surgery - Date 04/12/18   Referring Physician Coy Us MD   Patient/Family Goals Statement to walk out of here; to be independent   Additional Occupational Profile  "Info/Pertinent History of Current Problem Pt is a 73 year old male with a PMHx of with a complex PMH antiphospholipid antibody syndrome s/p IVC filter placement and previously on coumadin, including alcohol abuse, left BKA, balance problems, and cardiomyopathy who presented to the ED via EMS after a fall on 4/7/18. Patient had refused to be taken to the hospital via EMS after initial evaluation. Patient began to get increasingly lethargic and wife called EMS again to transport patient to the ER. He was essentially unresponsive at this time. Head CT showed a subdural hematoma on the right with a leftward shift. Patient was taken to the OR for evacuation and underwent craniotomy. Placed in SICU post-surgery. Post-op CT showed mild improvement of midline shift and continued improvement in hemipareshis/dysphagia and mentation   Precautions/Limitations other (see comments)  (craniotomy precautions)   Weight-Bearing Status - LUE (10# lifting/pushing/pulling restriction)   Weight-Bearing Status - RUE (10# lifting/pushing/pulling restriction)   Weight-Bearing Status - LLE full weight-bearing   Weight-Bearing Status - RLE full weight-bearing   Cognitive Status Examination   Orientation person;place  (\"April 19th, 2018\")   Level of Consciousness alert   Able to Follow Commands success, 1-step commands   Personal Safety (Cognitive) mild impairment   Memory impaired   Visual Perception   Visual Perception Wears glasses  (for reading; denies new concerns)   Visual Acuity WFL   Visual Field WFL   Occulomotor WFL   Sensory Examination   Sensory Comments Pt has neuropathy in B hands and feet   Pain Assessment   Patient Currently in Pain No   Range of Motion (ROM)   ROM Comment BUEs WFL   Strength   Strength Comments N/T due to precautions   ARC Assessment Only   Acute Rehab FIM See FIM scores for Mobility/ADL Assessment   Activities of Daily Living Analysis   Impairments Contributing to Impaired Activities of Daily Living balance " impaired;cognition impaired;post surgical precautions;strength decreased;sensory feedback impaired;sensation decreased   General Therapy Interventions   Planned Therapy Interventions ADL retraining;IADL retraining;bed mobility training;cognition;groups;motor coordination training;neuromuscular re-education;ROM;strengthening;stretching;transfer training;home program guidelines;progressive activity/exercise   Clinical Impression   Criteria for Skilled Therapeutic Interventions Met yes, treatment indicated   OT Diagnosis decreased ADL/IADL (I)   Influenced by the following impairments pain, surgical precautions, impaired balance, cognitive deficits   Assessment of Occupational Performance 5 or more Performance Deficits   Identified Performance Deficits functional transfers, toileting, bathing, dressing, medication management, driving   Clinical Decision Making (Complexity) Low complexity   Therapy Frequency daily   Predicted Duration of Therapy Intervention (days/wks) 1-2 weeks   Anticipated Discharge Disposition Home with Assist;Home with Home Therapy;Home with Outpatient Therapy   Risks and Benefits of Treatment have been explained. Yes   Patient, Family & other staff in agreement with plan of care Yes   Total Evaluation Time   Total Evaluation Time (Minutes) 10

## 2018-04-13 NOTE — PLAN OF CARE
"Problem: Goal/Outcome  Goal: Goal Outcome Summary  FOCUS/GOAL  Bowel management, Nutrition/Feeding/Swallowing precautions and Wound care management    ASSESSMENT, INTERVENTIONS AND CONTINUING PLAN FOR GOAL:  Patient was alert and oriented this shift but was reluctant/slow to interact and answer questions.  Wife at bedside at start of shift and remained until MD assessed.  Patient with poor appetite, declined dinner, snacks, or beverages other than water.  Patient stated he takes Gabapentin and  Flonase at home, writer updated MD and received verbal order for both medications at home dosing frequencies.  Was both continent and incontinent of bowel and bladder this shift.  While writer was doing skin  observed patient to be incontinent of bowel.  When asked if he was aware he had a BM he nodded \"yes\".  Due to prosthetic already having been removed patient with poor balance when standing with walker.  Patient got back in bed and incontinent care was given by writer and second staff.  Patient was able to roll from side to side but teto cares were performed by staff.  Patient was also able to manage pulling brief on part of the way but with balance poor while standing writer finished pulling brief all the way up.  When writer removed prosthetic from left lower extremity bloody drainage observed on skin and prosthetic.  Stump observed to have both blanchable and non-blanchable pink areas with small bruise appearing to have small open area.  Writer cleansed skin and prosthetic and applied Mepilex to stump covering affected area.  Patient denies pain.                 "

## 2018-04-13 NOTE — PROGRESS NOTES
"CLINICAL NUTRITION SERVICES - ASSESSMENT NOTE     Nutrition Prescription    RECOMMENDATIONS FOR MDs/PROVIDERS TO ORDER:  None today    Malnutrition Status:    Non-severe malnutrition in the context of acute illness    Recommendations already ordered by Registered Dietitian (RD):  Boost Plus (chocolate) daily at 8:00am    Future/Additional Recommendations:  Anticipate pt ordering 1 meal/day as this is his baseline.     REASON FOR ASSESSMENT  Antonio Ramirez is a/an 73 year old male assessed by the dietitian for Admission Nutrition Risk Screen for unintentional loss of 10# or more in the past two months and reduced oral intake over the last month and RN Consult - wife reports weight loss of 10lbs or more in last month, poor appetite    PMH of CKD stage 3, GERD, CAD, liver disease, antiphospholipid antibody syndrome s/p IVC filter placement and previously on coumadin, including alcohol abuse, left BKA, balance problems, and cardiomyopathy who presented to the ED via EMS after a fall on 4/7/18.    NUTRITION HISTORY  - Pt reports eating 1 meal PTA as his baseline. This is typically dinner and he will eat pot roast, chicken, ribs and ready to eat foods from [employer]. He will occasionally have a starchy side as well. He drinks diet pepsi and water throughout the day as well as an Ensure in the morning.   - Pt was following a DD1 with nectar thickened liquids on 4/9 and advanced to a DD2 with thins on 4/11 per SLP recommendation.   - Pt reports strong dislike of DD2 diet. He denies any difficulty chewing/swallowing.     CURRENT NUTRITION ORDERS  Diet: Dysphagia Level 2: Mechanically Altered, Thin Liquids  Intake/Tolerance: pt last at at lunch yesterday - grapes (pt reports these were 'sneeked' in d/t diet restrictions), soup, ice cream, apple juice, and <1/2 turkey sandwich      LABS  Labs reviewed    MEDICATIONS  Medications reviewed    ANTHROPOMETRICS  Height: 185.4 cm (6' 1\")  Most Recent Weight: 86 kg (189 lb 8 oz)  "   IBW: 78.7 kg (83.6 kg - 5.9% for L BKA) 109% IBW  BMI: Overweight BMI 25-29.9  Weight History: pt has lost 28 lbs (12.8%) over the last month. Overall, he has lost 59 lbs (24%) over the last 4 months.   Wt Readings from Last 10 Encounters:   04/12/18 86 kg (189 lb 8 oz)   04/12/18 83.4 kg (183 lb 12.8 oz)   03/14/18 98.9 kg (218 lb)   02/05/18 109.3 kg (241 lb)   01/29/18 104.7 kg (230 lb 14.4 oz)   01/05/18 99.8 kg (220 lb)   12/22/17 108 kg (238 lb)   12/20/17 108.9 kg (240 lb)   12/18/17 113.2 kg (249 lb 9.6 oz)   12/15/17 113.2 kg (249 lb 9.6 oz)     Dosing Weight: 86 kg - admit wt    ASSESSED NUTRITION NEEDS  Estimated Energy Needs: 6607-8526 kcals/day (25 - 30 kcals/kg)  Justification: Maintenance  Estimated Protein Needs:  grams protein/day (1 - 1.2 grams of pro/kg)  Justification: Increased needs  Estimated Fluid Needs: 1 mL/kcal  Justification: Per provider pending fluid status    PHYSICAL FINDINGS  See malnutrition section below.  L BKA (2007)    MALNUTRITION  % Intake: < 75% for > 7 days (non-severe)  % Weight Loss: > 5% in 1 month (severe)  Subcutaneous Fat Loss: None observed  Muscle Loss: Temporal and Upper arm (bicep, tricep): mild  Fluid Accumulation/Edema: None noted  Malnutrition Diagnosis: Non-severe malnutrition in the context of acute illness    NUTRITION DIAGNOSIS  Inadequate oral intake related to dislike of modified textures as evidenced by pt following a DD2 diet and likely meeting <75% of nutritional needs over the last week    INTERVENTIONS  Implementation  Nutrition Education: discussed importance of adequate PO intakes to meet nutritional needs and how current intakes are likely not meeting his needs. Pt was agreeable to Boost Plus at breakfast. Intake will likely improve as diet advances to regular textures pending SLP.     Goals  Patient to consume % of nutritionally adequate meal trays qd-BID, or the equivalent with supplements/snacks.     Monitoring/Evaluation  Progress  toward goals will be monitored and evaluated per protocol.      Maren Franklin RD, LD  Unit Pager: [phone]

## 2018-04-14 PROCEDURE — 12800006 ZZH R&B REHAB

## 2018-04-14 PROCEDURE — 97530 THERAPEUTIC ACTIVITIES: CPT | Mod: GP

## 2018-04-14 PROCEDURE — 40000193 ZZH STATISTIC PT WARD VISIT

## 2018-04-14 PROCEDURE — 97535 SELF CARE MNGMENT TRAINING: CPT | Mod: GO

## 2018-04-14 PROCEDURE — 25000132 ZZH RX MED GY IP 250 OP 250 PS 637: Mod: GY | Performed by: PHYSICAL MEDICINE & REHABILITATION

## 2018-04-14 PROCEDURE — A9270 NON-COVERED ITEM OR SERVICE: HCPCS | Mod: GY | Performed by: PHYSICAL MEDICINE & REHABILITATION

## 2018-04-14 PROCEDURE — 40000133 ZZH STATISTIC OT WARD VISIT

## 2018-04-14 PROCEDURE — 40000225 ZZH STATISTIC SLP WARD VISIT: Performed by: SPEECH-LANGUAGE PATHOLOGIST

## 2018-04-14 PROCEDURE — 92526 ORAL FUNCTION THERAPY: CPT | Mod: GN | Performed by: SPEECH-LANGUAGE PATHOLOGIST

## 2018-04-14 PROCEDURE — 92523 SPEECH SOUND LANG COMPREHEN: CPT | Mod: GN | Performed by: REHABILITATION PRACTITIONER

## 2018-04-14 PROCEDURE — 40000225 ZZH STATISTIC SLP WARD VISIT: Performed by: REHABILITATION PRACTITIONER

## 2018-04-14 RX ADMIN — FLUTICASONE PROPIONATE 2 SPRAY: 50 SPRAY, METERED NASAL at 20:50

## 2018-04-14 RX ADMIN — CARVEDILOL 6.25 MG: 6.25 TABLET, FILM COATED ORAL at 08:33

## 2018-04-14 RX ADMIN — GABAPENTIN 600 MG: 600 TABLET, FILM COATED ORAL at 08:33

## 2018-04-14 RX ADMIN — TORSEMIDE 20 MG: 10 TABLET ORAL at 08:33

## 2018-04-14 RX ADMIN — LEVETIRACETAM 500 MG: 500 TABLET, FILM COATED ORAL at 20:49

## 2018-04-14 RX ADMIN — GABAPENTIN 600 MG: 600 TABLET, FILM COATED ORAL at 20:48

## 2018-04-14 RX ADMIN — FLUTICASONE PROPIONATE 2 SPRAY: 50 SPRAY, METERED NASAL at 08:33

## 2018-04-14 RX ADMIN — LEVETIRACETAM 500 MG: 500 TABLET, FILM COATED ORAL at 08:33

## 2018-04-14 NOTE — PROGRESS NOTES
"  [facility]   Acute Rehabilitation Unit  Daily progress note    interval history  Antonio Ramirez was seen and examined at bedside.     No acute events overnight, slept ok, no new pain, dislikes diet but understands this will take time to heal, notes that he was aware of stump bleeding but does not have any sensation in that area.  Denies any f,c,n,v, or new symptoms.  Working well with therapies, nervous about staples coming out and that it will hurt. otherwise doing well.  No new concerns        medications  Scheduled meds    carvedilol  6.25 mg Oral BID     levETIRAcetam  500 mg Oral BID     torsemide  20 mg Oral Daily     gabapentin  600 mg Oral BID     fluticasone  2 spray Both Nostrils BID       PRN meds:  acetaminophen, docusate sodium      physical exam  /56 (BP Location: Right arm)  Pulse 50  Temp 97.8  F (36.6  C) (Oral)  Resp 16  Ht 1.854 m (6' 1\")  Wt 86 kg (189 lb 8 oz)  SpO2 98%  BMI 25 kg/m2  Gen: pleasant, sitting up comfortably in bed, NAD  HEENT: NC/AT/ glasses on   CV: breathing comfortably on RA, Normal WOB, No distress  Abd: soft, ND, BM's   Ext: no edema or pain in RLE, removed prosthesis and noted slight saturation of mepilex pad, removed, and noted small area on lateral side of stump that had been weeping, with suture appearing still under skin, non-painful and not worsening, no concern at this time for infection.   Neuro/MSK: alert and oriented, he is conversing well and very \"matter of fact\".  He is able to make needs known.  Sensation intact grossly with the excpetion of the LLE distal stump without sensation.  He is ambulating well and moving all ext. Well.     labs  reviewed    assessment and plan  73-year-old with fall and subdural hematoma status post surgical evacuation.  History of antiphospholipid antibody syndrome prior blood clots for which he is chronically on warfarin, also has inferior vena cava filter.  Has past left " below-knee amputation.  Admitted to inpatient acute rehab 4/12/18 for continuation of cares.  Changes to care plan: 4/14  - No changes today  - Saw area of concern on L BKA, small area of concern that is bleeding/weeping, suture under skin,no current signs of infection. Continue with mepilex covering and monitor    Rehabilitation: will have 3 hours daily of SLP/PT/OT to assist with recovery and transition back to home.      1. Medical Conditions  # Right frontotemporal parietal subdural hematoma with midline shift 2/2 fall:  S/p craniotomy on 7/7. Occurred due to trauma after fall on 4/4. At the time of arrival on 4/7 he was essentially unresponsive with head CT showing a subdural hematoma on the right with midline shift. Was subsequently taken to OR for frontoparietal craniotomy and evacuation. Post-op CT w/ improvement of midline shift and continued improvement in hemiparesis/dysphagia and mentation. Was on warfarin for his AAS with history of DVT and PE; which is currently on hold.  Neurosurgery recommends holding it for at least 4 weeks until repeat CT is done an outpatient follow-up with neurosurgery before reinitiation of anticoagulation. Patient already has a IVC filter in place.   -  In case of emergent need for anticoagulation prior to this, neurosurgery recommends initiation without bolus.   - Tylenol available for pain prn   - Seizure prophylaxis, keppra 500 mg BID, plan for total of 2 weeks DC on 4/21/18  - Staples to be removed on 4/21/2018, may be done at the ARU       # Antiphospholipid Antibody Syndrome (AAS), IVC filter in place, on chronic anticoagulation:  History of Pulmonary Embolism, Deep Vein Thrombosis:   Will continue to hold PTA warfarin and ASA per neurosurgery recommendations as stated above.    - Patient had DVT/massive PE with saddle embolus about 4-5 years back and not in the recent past.  Already has a IVC filter.   - Neurosurgery strictly recommending against reinitiation of  anticoagulation until repeat CT shows stability unless absolutely necessary.  --------- Of note given his antiphospholipid antibody syndrome Coumadin dosing should be based on chromogenic factor 10 when appropriate to resume   - Will not be on any anticoagulation medication while on ARU, will have SCD's for mechanical prophylaxis      # Paroxysmal atrial fibrillation on chronic anticoagulation with noted slowed ventricular response:   Pt received atropine post operatively for bradycardia HR 30s.  Patient tolerating PTA coreg. At increased dose of 6.25mg (was 3.125mg PTA) due to intermittent elevated BPs and tolerating well.  - Holding PTA warfarin as noted above and should be initiated once cleared by neurosurgery   - Will cont Coreg 6.25 while on ARU      # Chronic anemia.  - Hemoglobin  baseline range 8.0-11  -Has been stable around 9-10.  No active signs of bleeding.    - Will cont to monitor      # History of alcohol use disorder.  # Alcoholic cirrhosis with ascites, portal hypertension.  - Drinks approx 1/3rd bottle of vodka a day at home  - No withdrawal symptoms on this past hospital admission  - Will monitor although out of window by this time.   - Dr. Pope to see him if needed       # HFrEF, EF 40-45%.  # Cardiomyopathy.  # Mild valvular aortic stenosis, moderate mitral regurgitation, mild to moderate tricuspid regurgitation, mild ascending aorta dilatation.  # Coronary artery disease s/p 3-v CABG.  # Thoracic aortic aneurysm without rupture.  - BNP on admission 4334  - Continue PTA Coreg  - Holding PTA ASA, warfarin as above  - Not on statin PTA due to intolerance  -Continue PTA torsemide.  Patient was euvolemic at the time of admission to ARU      # Hyperlipidemia.  - Does not tolerate statin therapy, not on PTA medication for above diagnosis; will continue to monitor at this time.      # Essential hypertension.  - Current SBP goal <140, currently on coreg and torsemide and tolerating well  -monitor and  adjust medication as needed      # Chronic kidney disease, stage 3.  - History of ANSELMO requiring HD in 2010  Renal function has been stable, will monitor intermittently, avoid nephrotoxins      # Suspected urinary tract infection. Resolved  - U/A noted positive nitrites, large leukocyte esterase and >182 WBC  -Was on Ancef perioperatively.  Urine culture with 50,000- 100,000 CFU Klebsiella and patient currently asymptomatic, no leukocytosis  -Continued to monitor off antibiotics      # Gastroesophageal reflux disease.  - Not on any PTA medications for above diagnosis, will continue to monitor at this time.      # Low grade B cell lymphoproliferative disorder.  History of prostate cancer s/p radiative seed implants.  - No acute issues, will continue to monitor.      # Peripheral polyneuropathy.  - Holding PTA gabapentin at this time as patient gradually clearing on his mentation.  Patient asymptomatic currently.  Should be able to resume Neurontin once back to his baseline and if he starts having symptoms  - Could restart on ARU if he is having symptoms and pain as his cognition is improving   - Will monitor for now       # Discharge Pain Plan:   - Patient currently has NO PAIN and is not being prescribed pain medications on discharge.     2. Adjustment to disability:  Clinical psychology to eval and treat if needed  3. FEN: DDL2 with thin liquids , and magic cup in between meals  4. Bowel: PRN bowel program  5. Bladder: PVR x2 and IC for volumes >400cc  6. DVT Prophylaxis: N/A  7. GI Prophylaxis: N/A  8. Code: Full code   9. Disposition: home likely with increased family support.  10. ELOS:  7-14 days.  11. Rehab prognosis:  good  12. Follow up Appointments on Discharge: PCP, neurosurgery in 4 wks      Kyle Bobby MD PGY-2  Physical Medicine & Rehabilitation  Pager: [phone]  .  I, Louise Kiser, saw the patient with the resident and agree with the resident's findings, and plan of of care as documented  in the resident's note. I personally reviewed the vitals signs, medications, labs and imaging.     Key findings are as follows   He presents with intense rehabilitation for SDH in the setting of LBKA. Notes ongoing bleeding from the stump site. Reviewed WOC notes.   Per SLP, he does not show any cognitive deficits on preliminary tests. Ongoing standardized testing raz.     For any questions, please feel free to page me at [phone]       Louise Kiser MD   Department of PM&R

## 2018-04-14 NOTE — PLAN OF CARE
Problem: Goal/Outcome  Goal: Goal Outcome Summary  Outcome: No Change  FOCUS/GOAL  Bowel management, Bladder management and Nutrition/Feeding/Swallowing precautions    ASSESSMENT, INTERVENTIONS AND CONTINUING PLAN FOR GOAL:  Pt used call light appropriately and waited for staff to provide assistance. Denied pain.   Ambulated to bathroom with CGA and walker, had continent of bm x1 and voided but brief was soiled with dribbling. Needing assist with brief change. PVR 62cc.  When assisted to doff pants at HS, noticed that pt had incontinence of bm and soiled pants and bed linen but pt wasn't aware of it. Needing assist with wipe and brief change at bedside.   On DD2 and thin liquids. Pt ate about 75% of supper.   Pt denied pain, chest pain, or SOB.  Bed alarm on.

## 2018-04-14 NOTE — PLAN OF CARE
Problem: Goal/Outcome  Goal: Goal Outcome Summary  Outcome: No Change  FOCUS/GOAL  Bladder management and Pain management    ASSESSMENT, INTERVENTIONS AND CONTINUING PLAN FOR GOAL:  Pt slept well, denies pain and discomfort, uses call light appropetly. No SOB noted or reported. NSG to continue monitoring.

## 2018-04-14 NOTE — PLAN OF CARE
Problem: Individualization  Goal: Patient Individualization  Outcome: Improving  FOCUS/GOAL  Wound care management    ASSESSMENT, INTERVENTIONS AND CONTINUING PLAN FOR GOAL:  New Mepilex applied to distal area of right BKA, small amount of serosanguineous drainage noted from two small open areas, skin surrounding has bruising and erythema non-blanchable.

## 2018-04-14 NOTE — PLAN OF CARE
Problem: Patient Care Overview  Goal: Plan of Care/Patient Progress Review  Outcome: Therapy, progress toward functional goals as expected  Speech/lang eval completed. Minor errors noted in memory portion of cognitive screen. Planning formal cognitive assessment.

## 2018-04-14 NOTE — PLAN OF CARE
Problem: Patient Care Overview  Goal: Plan of Care/Patient Progress Review  FOCUS/GOAL  Bowel management, Bladder management and Wound care management    ASSESSMENT, INTERVENTIONS AND CONTINUING PLAN FOR GOAL:  AOX4, uses call light and able to make needs known. CGA with walker for transfers.No BM this shift. Two episodes of bladder incontinence this shift. Needs to try voiding every 2 to 3 hours to maintain continence. Incision on scalp stapled, cleaned with wound cleanser, no sign of infection. Denies pain, denies SOB, pleasant and cooperative with care.

## 2018-04-14 NOTE — PROGRESS NOTES
04/14/18 1000   General Information, SLP   Type of Evaluation Speech and Language   Type of Visit Initial   Start of Care Date 04/14/18   Onset of Illness/Injury or Date of Surgery - Date 04/07/18   Referring Physician Dr. Us   Patient/Family Goals Statement Pt hopes to return home soon   Pertinent History of Current Problem Pt admitted s/p subdural hematoma requiring evaculation and emergent craniotomy on 4/7/18 after a mechanical fall   Language: Auditory Comprehension (understanding of spoken language)   Paragraph; Discourse Comprehension Test (out of 8 total; less than 7 is below mean) 7   Functional Assessment Scale (Auditory Comprehension) No Impairment   Language: Verbal Expression (use of spoken language to express information)   Conversation; [place] Diagnostic Aphasia Exam rating (out of 5 total) 5   Functional Assessment Scale (Verbal Expression) No Impairment   Reading Comprehension (understanding of written language)   Sentences and Paragraphs; [place] Diagnostic Aphasia Exam (out of 10 total) 10   Functional Assessment Scale (Reading Comprehension) No Impairment   Cognitive Status Examination   Attention intact   Behavioral Observations WFL   Short Term Memory impaired  (6/8 word recall)   Reasoning (10/10 alphabetizing words)   Additional cognitive-linguistic evaluation indicated  yes;Wendy-Gagandeep;RBANS   Clinical Impression, SLP Eval   Criteria for Skilled Therapeutic Interventions Met Yes;Treatment indicated   Rehab Potential Good, to achieve stated therapy goals   Therapy Frequency Daily   Predicted Duration of Therapy Intervention (days/wks) 3 days   Anticipated Discharge Disposition Home with Assist   Risks and Benefits of Treatment have been explained. Yes   Patient, Family & other staff in agreement with plan of care Yes   Clinical Impression Comments Pt presents with intact auditory comprehension, reading comprehension and verbal expression skills. Minor errors noted in memory portion  of cognitive screen, though pt reports he was not concentrating to his full ability. Recommend standardized cognitive linguistic testing to rule out higher level deficits. SLP indicated to maximize safety/independence to return to home/community.   Total Evaluation Time      Total Evaluation Time (Minutes) 35

## 2018-04-14 NOTE — PROGRESS NOTES
Dagoberto was limited today in PT by MD visits and Nursing care needs. His balance in primary limitation with no AD. He was able to ambulate over 300 with with CGA and no AD but frequent stops to regain balance and has instability with single leg stance and terminal stance.

## 2018-04-15 PROCEDURE — 92526 ORAL FUNCTION THERAPY: CPT | Mod: GN | Performed by: SPEECH-LANGUAGE PATHOLOGIST

## 2018-04-15 PROCEDURE — 97530 THERAPEUTIC ACTIVITIES: CPT | Mod: GO

## 2018-04-15 PROCEDURE — A9270 NON-COVERED ITEM OR SERVICE: HCPCS | Mod: GY | Performed by: PHYSICAL MEDICINE & REHABILITATION

## 2018-04-15 PROCEDURE — 12800006 ZZH R&B REHAB

## 2018-04-15 PROCEDURE — 97530 THERAPEUTIC ACTIVITIES: CPT | Mod: GP

## 2018-04-15 PROCEDURE — 40000225 ZZH STATISTIC SLP WARD VISIT: Performed by: SPEECH-LANGUAGE PATHOLOGIST

## 2018-04-15 PROCEDURE — 25000132 ZZH RX MED GY IP 250 OP 250 PS 637: Mod: GY | Performed by: PHYSICAL MEDICINE & REHABILITATION

## 2018-04-15 PROCEDURE — 97535 SELF CARE MNGMENT TRAINING: CPT | Mod: GO

## 2018-04-15 PROCEDURE — 40000133 ZZH STATISTIC OT WARD VISIT

## 2018-04-15 PROCEDURE — 40000193 ZZH STATISTIC PT WARD VISIT

## 2018-04-15 RX ORDER — CARVEDILOL 3.12 MG/1
3.12 TABLET ORAL 2 TIMES DAILY
Status: DISCONTINUED | OUTPATIENT
Start: 2018-04-15 | End: 2018-04-19

## 2018-04-15 RX ADMIN — GABAPENTIN 600 MG: 600 TABLET, FILM COATED ORAL at 08:30

## 2018-04-15 RX ADMIN — GABAPENTIN 600 MG: 600 TABLET, FILM COATED ORAL at 19:56

## 2018-04-15 RX ADMIN — LEVETIRACETAM 500 MG: 500 TABLET, FILM COATED ORAL at 19:56

## 2018-04-15 RX ADMIN — CARVEDILOL 3.12 MG: 3.12 TABLET, FILM COATED ORAL at 19:56

## 2018-04-15 RX ADMIN — FLUTICASONE PROPIONATE 2 SPRAY: 50 SPRAY, METERED NASAL at 19:55

## 2018-04-15 RX ADMIN — LEVETIRACETAM 500 MG: 500 TABLET, FILM COATED ORAL at 08:29

## 2018-04-15 RX ADMIN — TORSEMIDE 20 MG: 10 TABLET ORAL at 08:29

## 2018-04-15 RX ADMIN — FLUTICASONE PROPIONATE 2 SPRAY: 50 SPRAY, METERED NASAL at 08:30

## 2018-04-15 NOTE — PLAN OF CARE
Problem: Patient Care Overview  Goal: Plan of Care/Patient Progress Review  FOCUS/GOAL  Bowel management, Bladder management and Wound care management    ASSESSMENT, INTERVENTIONS AND CONTINUING PLAN FOR GOAL:  AO with forgetfulness, alarms on for safety. CGA with walker for transfers. Continent of bladder this shift with 2 to 3 hour voiding schedule. NO BM this shift. Dressing LLE changed today. Scalp incision stapled, clean, with no sign of infection. Coreg held this evening. Denies pain, denies SOB, pleasant and cooperative with care. Continue to monitor open area on LLE.

## 2018-04-15 NOTE — PLAN OF CARE
Problem: Physician Care Plan Review  Goal: Physician Review  I reviewed and agree with the plan of care  Louise Kiser

## 2018-04-15 NOTE — PROGRESS NOTES
"  [facility]   Acute Rehabilitation Unit  Daily progress note    interval history  Antonio Ramirez was seen and examined at bedside.     No acute events overnight, slept well enough, no new concerns with current status but is worried about having the staples removed on the 21st.  Feels as though this will be very painful.  Discussed this at length and re-assured him this is standard and that it is a fairly simple procedure that does not typically cause any significant pain and patients rarely will have any bleeding or need for anesthetic.  He seemed at ease about this but still worried about pain. Otherwise he is doing well with therapy and has no other concerns.       medications  Scheduled meds    carvedilol  3.125 mg Oral BID     levETIRAcetam  500 mg Oral BID     torsemide  20 mg Oral Daily     gabapentin  600 mg Oral BID     fluticasone  2 spray Both Nostrils BID       PRN meds:  acetaminophen, docusate sodium      physical exam  /55 (BP Location: Right arm)  Pulse 59  Temp 97.1  F (36.2  C) (Oral)  Resp 16  Ht 1.854 m (6' 1\")  Wt 86 kg (189 lb 8 oz)  SpO2 97%  BMI 25 kg/m2  Gen: pleasant, sitting up comfortably in bed, NAD  HEENT: NC/AT/ glasses on   CV: breathing comfortably on RA, Normal WOB, No distress  Abd: soft, ND, BM's   Ext: no edema or pain in RLE, prosthesis on, did not see small wound today.   Neuro/MSK: alert and oriented, he is conversing well and able to make needs known.  Sensation intact grossly with the excpetion of the LLE distal stump without sensation.  He is ambulating well and moving all ext. Well.     labs  reviewed    assessment and plan  73-year-old with fall and subdural hematoma status post surgical evacuation.  History of antiphospholipid antibody syndrome prior blood clots for which he is chronically on warfarin, also has inferior vena cava filter.  Has past left below-knee amputation.  Admitted to inpatient acute rehab 4/12/18 for " continuation of cares.  Changes to care plan: 4/15  - Changed Coreg to 3.125 as it was held again last night due to hold parameters.   - No other changes to meds or careplan'  - Cont therapies and cares     Rehabilitation: will have 3 hours daily of SLP/PT/OT to assist with recovery and transition back to home.      1. Medical Conditions  # Right frontotemporal parietal subdural hematoma with midline shift 2/2 fall:  S/p craniotomy on 7/7. Occurred due to trauma after fall on 4/4. At the time of arrival on 4/7 he was essentially unresponsive with head CT showing a subdural hematoma on the right with midline shift. Was subsequently taken to OR for frontoparietal craniotomy and evacuation. Post-op CT w/ improvement of midline shift and continued improvement in hemiparesis/dysphagia and mentation. Was on warfarin for his AAS with history of DVT and PE; which is currently on hold.  Neurosurgery recommends holding it for at least 4 weeks until repeat CT is done an outpatient follow-up with neurosurgery before reinitiation of anticoagulation. Patient already has a IVC filter in place.   -  In case of emergent need for anticoagulation prior to this, neurosurgery recommends initiation without bolus.   - Tylenol available for pain prn   - Seizure prophylaxis, keppra 500 mg BID, plan for total of 2 weeks DC on 4/21/18  - Staples to be removed on 4/21/2018, may be done at the ARU       # Antiphospholipid Antibody Syndrome (AAS), IVC filter in place, on chronic anticoagulation:  History of Pulmonary Embolism, Deep Vein Thrombosis:   Will continue to hold PTA warfarin and ASA per neurosurgery recommendations as stated above.    - Patient had DVT/massive PE with saddle embolus about 4-5 years back and not in the recent past.  Already has a IVC filter.   - Neurosurgery strictly recommending against reinitiation of anticoagulation until repeat CT shows stability unless absolutely necessary.  --------- Of note given his  antiphospholipid antibody syndrome Coumadin dosing should be based on chromogenic factor 10 when appropriate to resume   - Will not be on any anticoagulation medication while on ARU, will have SCD's for mechanical prophylaxis      # Paroxysmal atrial fibrillation on chronic anticoagulation with noted slowed ventricular response:   Pt received atropine post operatively for bradycardia HR 30s.  Patient tolerating PTA coreg. At increased dose of 6.25mg (was 3.125mg PTA) due to intermittent elevated BPs and tolerating well.  - Holding PTA warfarin as noted above and should be initiated once cleared by neurosurgery   - Will cont Coreg 6.25 while on ARU      # Chronic anemia.  - Hemoglobin  baseline range 8.0-11  -Has been stable around 9-10.  No active signs of bleeding.    - Will cont to monitor      # History of alcohol use disorder.  # Alcoholic cirrhosis with ascites, portal hypertension.  - Drinks approx 1/3rd bottle of vodka a day at home  - No withdrawal symptoms on this past hospital admission  - Will monitor although out of window by this time.   - Dr. Pope to see him if needed       # HFrEF, EF 40-45%.  # Cardiomyopathy.  # Mild valvular aortic stenosis, moderate mitral regurgitation, mild to moderate tricuspid regurgitation, mild ascending aorta dilatation.  # Coronary artery disease s/p 3-v CABG.  # Thoracic aortic aneurysm without rupture.  - BNP on admission 4334  - Continue PTA Coreg - decreased dose to 3.125 on 4/15  - Holding PTA ASA, warfarin as above  - Not on statin PTA due to intolerance  -Continue PTA torsemide.  Patient was euvolemic at the time of admission to ARU      # Hyperlipidemia.  - Does not tolerate statin therapy, not on PTA medication for above diagnosis; will continue to monitor at this time.      # Essential hypertension.  - Current SBP goal <140, currently on coreg and torsemide and tolerating well  -monitor and adjust medication as needed      # Chronic kidney disease, stage 3.  -  History of ANSELMO requiring HD in 2010  Renal function has been stable, will monitor intermittently, avoid nephrotoxins      # Suspected urinary tract infection. Resolved  - U/A noted positive nitrites, large leukocyte esterase and >182 WBC  -Was on Ancef perioperatively.  Urine culture with 50,000- 100,000 CFU Klebsiella and patient currently asymptomatic, no leukocytosis  -Continued to monitor off antibiotics      # Gastroesophageal reflux disease.  - Not on any PTA medications for above diagnosis, will continue to monitor at this time.      # Low grade B cell lymphoproliferative disorder.  History of prostate cancer s/p radiative seed implants.  - No acute issues, will continue to monitor.      # Peripheral polyneuropathy.  - Holding PTA gabapentin at this time as patient gradually clearing on his mentation.  Patient asymptomatic currently.  Should be able to resume Neurontin once back to his baseline and if he starts having symptoms  - Could restart on ARU if he is having symptoms and pain as his cognition is improving   - Will monitor for now       # Discharge Pain Plan:   - Patient currently has NO PAIN and is not being prescribed pain medications on discharge.     2. Adjustment to disability:  Clinical psychology to eval and treat if needed  3. FEN: DDL2 with thin liquids , and magic cup in between meals  4. Bowel: PRN bowel program  5. Bladder: PVR x2 and IC for volumes >400cc  6. DVT Prophylaxis: N/A  7. GI Prophylaxis: N/A  8. Code: Full code   9. Disposition: home likely with increased family support.  10. ELOS:  7-14 days.  11. Rehab prognosis:  good  12. Follow up Appointments on Discharge: PCP, neurosurgery in 4 wks      Kyle Bobby MD PGY-2  Physical Medicine & Rehabilitation  Pager: [phone]    I, Louise Kiser, saw the patient with the resident and agree with the resident's findings, and plan of of care as documented in the resident's note. I personally reviewed the vitals signs,  medications, labs and imaging.     Key findings are as follows   He is motivated   Continues to require monitoring of the sutures at the stump site.   Coreg being stopped today, as not being used as a prn medication  Continue DD2 with thins.   For any questions, please feel free to page me at [phone]       Louise Kiser MD   Department of PM&R

## 2018-04-15 NOTE — PLAN OF CARE
"Problem: Patient Care Overview  Goal: Plan of Care/Patient Progress Review  SLP: Pt seen for swallowing, trialed regular diet, appears safe to advance.  Noted pt drinking with straw.  One time cough end of meal on thin liquid by cup, Cautioned small bites and sips.  Recommend advance to regular diet , Continue thin fluids.  REcommend small bites and sips, monitor any sx aspiration Uncertain if dysphonia baseline , unclear per pt reports \" I have rhinitis.\"      "

## 2018-04-15 NOTE — PLAN OF CARE
"Problem: Goal/Outcome  Goal: Goal Outcome Summary  Outcome: Improving  Patient states he came back from the bathroom without calling for assist.  States he knew there was a call light but \"I can do it.\"  Educated to please call staff to assist with transfers at this time.  Discussed with team at [facility], not ready to be independent in room yet due to balance.  Coreg held this am per parameters.  Discussed with MD. Dose was decreased back to his regular home dose.  One urine accident with soiling of pants this am.  He took pants off and donned new ones after set up.    Patient was on DD3 diet with thin liquid, advanced to regular this afternoon.      "

## 2018-04-15 NOTE — PLAN OF CARE
Problem: Patient Care Overview  Goal: Plan of Care/Patient Progress Review  Pt completed home safety kitchen assessment with pt able to locate 7/9 unsafe items within the kitchen. Pt was unable to notice that oven was on and that coffee pot was plugged in and on.

## 2018-04-16 PROCEDURE — 97116 GAIT TRAINING THERAPY: CPT | Mod: GP

## 2018-04-16 PROCEDURE — 40000193 ZZH STATISTIC PT WARD VISIT

## 2018-04-16 PROCEDURE — 12800006 ZZH R&B REHAB

## 2018-04-16 PROCEDURE — 40000225 ZZH STATISTIC SLP WARD VISIT: Performed by: SPEECH-LANGUAGE PATHOLOGIST

## 2018-04-16 PROCEDURE — 40000133 ZZH STATISTIC OT WARD VISIT

## 2018-04-16 PROCEDURE — A9270 NON-COVERED ITEM OR SERVICE: HCPCS | Mod: GY | Performed by: PHYSICAL MEDICINE & REHABILITATION

## 2018-04-16 PROCEDURE — 25000132 ZZH RX MED GY IP 250 OP 250 PS 637: Mod: GY | Performed by: PHYSICAL MEDICINE & REHABILITATION

## 2018-04-16 PROCEDURE — 97110 THERAPEUTIC EXERCISES: CPT | Mod: GP

## 2018-04-16 PROCEDURE — 97530 THERAPEUTIC ACTIVITIES: CPT | Mod: GO

## 2018-04-16 PROCEDURE — 97535 SELF CARE MNGMENT TRAINING: CPT | Mod: GO

## 2018-04-16 PROCEDURE — 92526 ORAL FUNCTION THERAPY: CPT | Mod: GN | Performed by: SPEECH-LANGUAGE PATHOLOGIST

## 2018-04-16 PROCEDURE — 97127 ZZHC SP THERAPEUTIC INTERVENTION W/FOCUS ON COGNITIVE FUNCTION,EA 15 MIN: CPT | Mod: GN | Performed by: SPEECH-LANGUAGE PATHOLOGIST

## 2018-04-16 PROCEDURE — 97110 THERAPEUTIC EXERCISES: CPT | Mod: GO

## 2018-04-16 RX ADMIN — LEVETIRACETAM 500 MG: 500 TABLET, FILM COATED ORAL at 07:44

## 2018-04-16 RX ADMIN — GABAPENTIN 600 MG: 600 TABLET, FILM COATED ORAL at 07:44

## 2018-04-16 RX ADMIN — FLUTICASONE PROPIONATE 2 SPRAY: 50 SPRAY, METERED NASAL at 07:48

## 2018-04-16 RX ADMIN — GABAPENTIN 600 MG: 600 TABLET, FILM COATED ORAL at 19:29

## 2018-04-16 RX ADMIN — LEVETIRACETAM 500 MG: 500 TABLET, FILM COATED ORAL at 19:28

## 2018-04-16 RX ADMIN — FLUTICASONE PROPIONATE 2 SPRAY: 50 SPRAY, METERED NASAL at 19:32

## 2018-04-16 NOTE — PROGRESS NOTES
"  [facility]   Acute Rehabilitation Unit  Daily progress note    interval history  Antonio Ramirez was seen and examined during therapy.     No acute events overnight, slept well enough, no new concerns but continues to be worried about having the staples removed on the 21st. Feels as though this will be very painful. Will remove on the 20th upon patients request. Discussed this at length and re-assured him this is standard and that it is a fairly simple procedure. Otherwise he is doing well with therapy and has no other concerns. Pt believes he is improving, but shared that his rate of improvement may be slowed by use of cane or walker. Discussed with patient how we balance the for safety with the rate of recovery as patient admits to still being off balance.. He was agreable with treatment plan if safety was the main concern.      medications  Scheduled meds    carvedilol  3.125 mg Oral BID     levETIRAcetam  500 mg Oral BID     torsemide  20 mg Oral Daily     gabapentin  600 mg Oral BID     fluticasone  2 spray Both Nostrils BID       PRN meds:  acetaminophen, docusate sodium      physical exam  /47 (BP Location: Right arm)  Pulse 56  Temp 98  F (36.7  C) (Oral)  Resp 18  Ht 1.854 m (6' 1\")  Wt 86 kg (189 lb 8 oz)  SpO2 98%  BMI 25 kg/m2  Gen: pleasant, sitting up comfortably in bed, NAD  HEENT: NC/AT/ glasses on   CV: breathing comfortably on RA, Normal WOB, No distress  Abd: soft, ND, BM's   Ext: no edema or pain in RLE, prosthesis on, did not see small wound today.   Neuro/MSK: alert and oriented, he is conversing well and able to make needs known.  Sensation intact grossly with the excpetion of the LLE distal stump without sensation. Gait with slightly reduced stance phase on the left. Has good heel-toe progression with a reduced stride length on the right.     labs  reviewed    assessment and plan  73-year-old with fall and subdural hematoma status post " surgical evacuation.  History of antiphospholipid antibody syndrome prior blood clots for which he is chronically on warfarin, also has inferior vena cava filter.  Has past left below-knee amputation.  Admitted to inpatient acute rehab 4/12/18 for continuation of cares.  Changes to care plan: 4/16  - Changed Coreg to 3.125 on 4/15 as it was held again last night due to hold parameters.   - No other changes to meds or careplan  - Cont therapies and cares     Rehabilitation: will have 3 hours daily of SLP/PT/OT to assist with recovery and transition back to home.      1. Medical Conditions  # Right frontotemporal parietal subdural hematoma with midline shift 2/2 fall:  S/p craniotomy on 7/7. Occurred due to trauma after fall on 4/4. At the time of arrival on 4/7 he was essentially unresponsive with head CT showing a subdural hematoma on the right with midline shift. Was subsequently taken to OR for frontoparietal craniotomy and evacuation. Post-op CT w/ improvement of midline shift and continued improvement in hemiparesis/dysphagia and mentation. Was on warfarin for his AAS with history of DVT and PE; which is currently on hold.  Neurosurgery recommends holding it for at least 4 weeks until repeat CT is done an outpatient follow-up with neurosurgery before reinitiation of anticoagulation. Patient already has a IVC filter in place.   -  In case of emergent need for anticoagulation prior to this, neurosurgery recommends initiation without bolus.   - Tylenol available for pain prn   - Seizure prophylaxis, keppra 500 mg BID, plan for total of 2 weeks DC on 4/21/18  - Staples to be removed on 4/20/2018, may be done at the ARU       # Antiphospholipid Antibody Syndrome (AAS), IVC filter in place, on chronic anticoagulation:  History of Pulmonary Embolism, Deep Vein Thrombosis:   Will continue to hold PTA warfarin and ASA per neurosurgery recommendations as stated above.    - Patient had DVT/massive PE with saddle embolus  about 4-5 years back and not in the recent past.  Already has a IVC filter.   - Neurosurgery strictly recommending against reinitiation of anticoagulation until repeat CT shows stability unless absolutely necessary.  --------- Of note given his antiphospholipid antibody syndrome Coumadin dosing should be based on chromogenic factor 10 when appropriate to resume   - Will not be on any anticoagulation medication while on ARU, will have SCD's for mechanical prophylaxis      # Paroxysmal atrial fibrillation on chronic anticoagulation with noted slowed ventricular response:   Pt received atropine post operatively for bradycardia HR 30s.  Patient tolerating PTA coreg. At increased dose of 6.25mg (was 3.125mg PTA) due to intermittent elevated BPs and tolerating well.  - Holding PTA warfarin as noted above and should be initiated once cleared by neurosurgery   - Coreg reduced to 3.125mg PO on 4/15 due to soft pressures. Will continue to monitor      # Chronic anemia.  - Hemoglobin  baseline range 8.0-11  -Has been stable around 9-10.  No active signs of bleeding.    - Will cont to monitor      # History of alcohol use disorder.  # Alcoholic cirrhosis with ascites, portal hypertension.  - Drinks approx 1/3rd bottle of vodka a day at home  - No withdrawal symptoms on this past hospital admission  - Will monitor although out of window by this time.   - Dr. Pope to see him if needed       # HFrEF, EF 40-45%.  # Cardiomyopathy.  # Mild valvular aortic stenosis, moderate mitral regurgitation, mild to moderate tricuspid regurgitation, mild ascending aorta dilatation.  # Coronary artery disease s/p 3-v CABG.  # Thoracic aortic aneurysm without rupture.  - BNP on admission 4334  - Continue PTA Coreg - decreased dose to 3.125 on 4/15  - Holding PTA ASA, warfarin as above  - Not on statin PTA due to intolerance  -Continue PTA torsemide.  Patient was euvolemic at the time of admission to ARU      # Hyperlipidemia.  - Does not  tolerate statin therapy, not on PTA medication for above diagnosis; will continue to monitor at this time.      # Essential hypertension.  - Current SBP goal <140, currently on coreg and torsemide and tolerating well  -monitor and adjust medication as needed      # Chronic kidney disease, stage 3.  - History of ANSELMO requiring HD in 2010  Renal function has been stable, will monitor intermittently, avoid nephrotoxins      # Suspected urinary tract infection. Resolved  - U/A noted positive nitrites, large leukocyte esterase and >182 WBC  -Was on Ancef perioperatively.  Urine culture with 50,000- 100,000 CFU Klebsiella and patient currently asymptomatic, no leukocytosis  -Continued to monitor off antibiotics      # Gastroesophageal reflux disease.  - Not on any PTA medications for above diagnosis, will continue to monitor at this time.      # Low grade B cell lymphoproliferative disorder.  History of prostate cancer s/p radiative seed implants.  - No acute issues, will continue to monitor.      # Peripheral polyneuropathy.  - Restarted gabapentin on 4/12 as cognition back at baseline. Patient asymptomatic currently.   - Gabapentin 600mg PO BID      # Discharge Pain Plan:   - Patient currently has NO PAIN and is not being prescribed pain medications on discharge.     2. Adjustment to disability:  Clinical psychology to eval and treat if needed  3. FEN: DDL2 with thin liquids , and magic cup in between meals  4. Bowel: PRN bowel program  5. Bladder: PVR x2 and IC for volumes >400cc  6. DVT Prophylaxis: N/A  7. GI Prophylaxis: N/A  8. Code: Full code   9. Disposition: home likely with increased family support.  10. ELOS:  7-14 days.  11. Rehab prognosis:  good  12. Follow up Appointments on Discharge: PCP, neurosurgery in 4 wks    Yony Rodriguez, MS4  [employer]    I, Dr. Us, also saw and examined Sage.   My wolff decisions and exam: Sage has advanced using dural based quad cane to assist with gait.  " He feels he would do better learning how to walk without either the cane or walker.  Pressed him focus on safety and using whatever \"tool\" it takes to maximize safety.  Also observed in physical therapy, still needing some contact-guard assistance as there were some deviations and partial knee buckle.    Decreased carvedilol over weekend to 3.125 mg twice daily.  Continue torsemide 20 mg daily.  His weight is about 185 pounds, down from 220-250 earlier hospitalization.  Considerably better lymphedema though still definite presence.    Team rounds tomorrow to review progress and update plan of care.    I have reviewed and edited the above student note and agree.  "

## 2018-04-16 NOTE — PLAN OF CARE
Problem: Goal/Outcome  Goal: Goal Outcome Summary  Outcome: No Change  Patient sleeping most of this shift, no complained of pain or discomfort, urinal at bedside and he using independently, staff empties. Patient has call light within reach, no sign or respiratory distress noted, bed alarm on for safety. Continue plan of care

## 2018-04-16 NOTE — PLAN OF CARE
Problem: Patient Care Overview  Goal: Plan of Care/Patient Progress Review  Completed a complex reasoning task- deductive puzzle- pt needing increased time and min cues to aidin divided attention demand of task- pt did have insight into this- stating jori he recognized that he had difficulty but stated that he feels he would have had difficulty on this previously as well. Completed complex level memory recall tasks- pt at 6/6 and 5/5 accuracy with hearing info read to him 1x and then recalling the details- pt constantino not think that he has any memory impairments- will further assess tomorrow

## 2018-04-16 NOTE — PLAN OF CARE
Problem: Goal/Outcome  Goal: Goal Outcome Summary  Outcome: Improving  FOCUS/GOAL  Wound care management, Medical management and Mobility    ASSESSMENT, INTERVENTIONS AND CONTINUING PLAN FOR GOAL:  Pt's coreg and torsemide held this morning per parameters. BP stable when rechecked at noon. Pt's left BKA stump wound with small serosanguinous drainage. Mepilex dressing changed over it. Pt denied any pain. CGA with transfers using a cane. Pt's wife and son at bedside at this time.

## 2018-04-16 NOTE — PLAN OF CARE
Problem: Patient Care Overview  Goal: Plan of Care/Patient Progress Review  SLP: Patient observed at meal of regular diet and thin liquids. Patient took small bites/sips independently and demonstrated generally demonstrated no s/s of aspiration- but 1x near the very end of the meal- had a mild cough following  larger bolus size of thin liquids. patient has mostly met his swallowing goals- but will keep an eye on thin liquid intake a bolus size. Will d/c this goal and continue assessing higher level cognition

## 2018-04-16 NOTE — PLAN OF CARE
Problem: Goal/Outcome  Goal: Goal Outcome Summary  FOCUS/GOAL  Bowel management and Medication management    ASSESSMENT, INTERVENTIONS AND CONTINUING PLAN FOR GOAL:  Pt A&Ox4, able to make needs known. Continent of bowel and bladder this shift using toilet in bathroom. LBM: 4/15. Crani incision THOMAS. Dressing changed to left BKA, small amount of serosanguinous drainage. Pt set off bed alarm x1. Transfers with CGA, gait belt and walker. Pt requested assistance to don/doff prosthesis. Pt declined shower tonight. Per report pt upgraded to regular diet today due to pt's request in place of DDIII, non-productive cough noted (pt claimed this is not new). Able to take medications whole. Continue POC.

## 2018-04-17 LAB
BACTERIA SPEC CULT: NO GROWTH
BACTERIA SPEC CULT: NO GROWTH
Lab: NORMAL
Lab: NORMAL
SPECIMEN SOURCE: NORMAL
SPECIMEN SOURCE: NORMAL

## 2018-04-17 PROCEDURE — A9270 NON-COVERED ITEM OR SERVICE: HCPCS | Mod: GY | Performed by: PHYSICAL MEDICINE & REHABILITATION

## 2018-04-17 PROCEDURE — 40000225 ZZH STATISTIC SLP WARD VISIT

## 2018-04-17 PROCEDURE — 97530 THERAPEUTIC ACTIVITIES: CPT | Mod: GP

## 2018-04-17 PROCEDURE — 97116 GAIT TRAINING THERAPY: CPT | Mod: GP

## 2018-04-17 PROCEDURE — 25000132 ZZH RX MED GY IP 250 OP 250 PS 637: Mod: GY | Performed by: PHYSICAL MEDICINE & REHABILITATION

## 2018-04-17 PROCEDURE — 40000133 ZZH STATISTIC OT WARD VISIT: Performed by: STUDENT IN AN ORGANIZED HEALTH CARE EDUCATION/TRAINING PROGRAM

## 2018-04-17 PROCEDURE — 97127 ZZHC SP THERAPEUTIC INTERVENTION W/FOCUS ON COGNITIVE FUNCTION,EA 15 MIN: CPT | Mod: GN

## 2018-04-17 PROCEDURE — 12800006 ZZH R&B REHAB

## 2018-04-17 PROCEDURE — 97112 NEUROMUSCULAR REEDUCATION: CPT | Mod: GP

## 2018-04-17 PROCEDURE — 97110 THERAPEUTIC EXERCISES: CPT | Mod: GP

## 2018-04-17 PROCEDURE — 40000133 ZZH STATISTIC OT WARD VISIT: Performed by: OCCUPATIONAL THERAPIST

## 2018-04-17 PROCEDURE — 97530 THERAPEUTIC ACTIVITIES: CPT | Mod: GO | Performed by: OCCUPATIONAL THERAPIST

## 2018-04-17 PROCEDURE — 96125 COGNITIVE TEST BY HC PRO: CPT | Mod: GN

## 2018-04-17 PROCEDURE — 40000193 ZZH STATISTIC PT WARD VISIT

## 2018-04-17 PROCEDURE — 97530 THERAPEUTIC ACTIVITIES: CPT | Mod: GO | Performed by: STUDENT IN AN ORGANIZED HEALTH CARE EDUCATION/TRAINING PROGRAM

## 2018-04-17 RX ADMIN — FLUTICASONE PROPIONATE 2 SPRAY: 50 SPRAY, METERED NASAL at 19:30

## 2018-04-17 RX ADMIN — LEVETIRACETAM 500 MG: 500 TABLET, FILM COATED ORAL at 19:28

## 2018-04-17 RX ADMIN — GABAPENTIN 600 MG: 600 TABLET, FILM COATED ORAL at 19:28

## 2018-04-17 RX ADMIN — FLUTICASONE PROPIONATE 2 SPRAY: 50 SPRAY, METERED NASAL at 09:14

## 2018-04-17 RX ADMIN — LEVETIRACETAM 500 MG: 500 TABLET, FILM COATED ORAL at 09:12

## 2018-04-17 RX ADMIN — GABAPENTIN 600 MG: 600 TABLET, FILM COATED ORAL at 09:12

## 2018-04-17 NOTE — PROGRESS NOTES
"  [facility]   Acute Rehabilitation Unit  Daily progress note    interval history  Antonio Ramirez was seen and examined during therapy.     No acute events overnight, slept well, working well with therapy, has no new concerns today, discussed staple removal which will take place on 4/20, denies any f,c,n,v, or new symptoms.  No new concerns today.     medications  Scheduled meds    carvedilol  3.125 mg Oral BID     levETIRAcetam  500 mg Oral BID     torsemide  20 mg Oral Daily     gabapentin  600 mg Oral BID     fluticasone  2 spray Both Nostrils BID       PRN meds:  acetaminophen, docusate sodium      physical exam  /52 (BP Location: Right arm)  Pulse (!) 49  Temp 97.6  F (36.4  C) (Oral)  Resp 16  Ht 1.854 m (6' 1\")  Wt 86 kg (189 lb 8 oz)  SpO2 96%  BMI 25 kg/m2  Gen: pleasant, working with therapy in [place], [facility]  HEENT: NC/AT - staples on right side of scalp with incision healing well  CV: breathing comfortably on RA, Normal WOB, No distress  Abd: soft, ND, BM's   Ext: no edema or pain in RLE, prosthesis on, did not see small wound today.   Neuro/MSK: alert and oriented, he is conversing well and able to make needs known.  Sensation intact grossly with the excpetion of the LLE distal stump without sensation and decreased proprioception as well.    labs  reviewed    assessment and plan  73-year-old with fall and subdural hematoma status post surgical evacuation.  History of antiphospholipid antibody syndrome prior blood clots for which he is chronically on warfarin, also has inferior vena cava filter.  Has past left below-knee amputation.  Admitted to inpatient acute rehab 4/12/18 for continuation of cares.    Changes to care plan: 4/16  - no changes to medications today.   - Cont therapies and cares     Rehabilitation: will have 3 hours daily of SLP/PT/OT to assist with recovery and transition back to home.      1. Medical Conditions  # Right frontotemporal " parietal subdural hematoma with midline shift 2/2 fall:  S/p craniotomy on 7/7. Occurred due to trauma after fall on 4/4. At the time of arrival on 4/7 he was essentially unresponsive with head CT showing a subdural hematoma on the right with midline shift. Was subsequently taken to OR for frontoparietal craniotomy and evacuation. Post-op CT w/ improvement of midline shift and continued improvement in hemiparesis/dysphagia and mentation. Was on warfarin for his AAS with history of DVT and PE; which is currently on hold.  Neurosurgery recommends holding it for at least 4 weeks until repeat CT is done an outpatient follow-up with neurosurgery before reinitiation of anticoagulation. Patient already has a IVC filter in place.   -  In case of emergent need for anticoagulation prior to this, neurosurgery recommends initiation without bolus.   - Tylenol available for pain prn   - Seizure prophylaxis, keppra 500 mg BID, plan for total of 2 weeks DC on 4/21/18  - Staples to be removed on 4/20/2018, may be done at the ARU       # Antiphospholipid Antibody Syndrome (AAS), IVC filter in place, on chronic anticoagulation:  History of Pulmonary Embolism, Deep Vein Thrombosis:   Will continue to hold PTA warfarin and ASA per neurosurgery recommendations as stated above.    - Patient had DVT/massive PE with saddle embolus about 4-5 years back and not in the recent past.  Already has a IVC filter.   - Neurosurgery strictly recommending against reinitiation of anticoagulation until repeat CT shows stability unless absolutely necessary.  --------- Of note given his antiphospholipid antibody syndrome Coumadin dosing should be based on chromogenic factor 10 when appropriate to resume   - Will not be on any anticoagulation medication while on ARU, will have SCD's for mechanical prophylaxis      # Paroxysmal atrial fibrillation on chronic anticoagulation with noted slowed ventricular response:   Pt received atropine post operatively for  bradycardia HR 30s.  Patient tolerating PTA coreg. At increased dose of 6.25mg (was 3.125mg PTA) due to intermittent elevated BPs and tolerating well.  - Holding PTA warfarin as noted above and should be initiated once cleared by neurosurgery   - Coreg reduced to 3.125mg PO on 4/15 due to soft pressures. Will continue to monitor      # Chronic anemia.  - Hemoglobin  baseline range 8.0-11  -Has been stable around 9-10.  No active signs of bleeding.    - Will cont to monitor      # History of alcohol use disorder.  # Alcoholic cirrhosis with ascites, portal hypertension.  - Drinks approx 1/3rd bottle of vodka a day at home  - No withdrawal symptoms on this past hospital admission  - Will monitor although out of window by this time.   - Dr. Pope to see him if needed       # HFrEF, EF 40-45%.  # Cardiomyopathy.  # Mild valvular aortic stenosis, moderate mitral regurgitation, mild to moderate tricuspid regurgitation, mild ascending aorta dilatation.  # Coronary artery disease s/p 3-v CABG.  # Thoracic aortic aneurysm without rupture.  - BNP on admission 4334  - Continue PTA Coreg - decreased dose to 3.125 on 4/15  - Holding PTA ASA, warfarin as above  - Not on statin PTA due to intolerance  -Continue PTA torsemide.  Patient was euvolemic at the time of admission to ARU      # Hyperlipidemia.  - Does not tolerate statin therapy, not on PTA medication for above diagnosis; will continue to monitor at this time.      # Essential hypertension.  - Current SBP goal <140, currently on coreg and torsemide and tolerating well  -monitor and adjust medication as needed      # Chronic kidney disease, stage 3.  - History of ANSELMO requiring HD in 2010  Renal function has been stable, will monitor intermittently, avoid nephrotoxins      # Suspected urinary tract infection. Resolved  - U/A noted positive nitrites, large leukocyte esterase and >182 WBC  -Was on Ancef perioperatively.  Urine culture with 50,000- 100,000 CFU Klebsiella and  patient currently asymptomatic, no leukocytosis  -Continued to monitor off antibiotics      # Gastroesophageal reflux disease.  - Not on any PTA medications for above diagnosis, will continue to monitor at this time.      # Low grade B cell lymphoproliferative disorder.  History of prostate cancer s/p radiative seed implants.  - No acute issues, will continue to monitor.      # Peripheral polyneuropathy.  - Restarted gabapentin on 4/12 as cognition back at baseline. Patient asymptomatic currently.   - Gabapentin 600mg PO BID      # Discharge Pain Plan:   - Patient currently has NO PAIN and is not being prescribed pain medications on discharge.     2. Adjustment to disability:  Clinical psychology to eval and treat if needed  3. FEN: DDL2 with thin liquids , and magic cup in between meals  4. Bowel: PRN bowel program  5. Bladder: PVR x2 and IC for volumes >400cc  6. DVT Prophylaxis: N/A  7. GI Prophylaxis: N/A  8. Code: Full code   9. Disposition: home likely with increased family support.  10. ELOS:  7-14 days.  11. Rehab prognosis:  good  12. Follow up Appointments on Discharge: PCP, neurosurgery in 4 wks    Kyle Bobby MD PGY-2  Physical Medicine & Rehabilitation  Pager: [phone]

## 2018-04-17 NOTE — PLAN OF CARE
Acute Rehab Care Conference/Team Rounds      Type: Team Rounds    Present: Dr. Coy Us, Dr Bashir Bobby Resident, Josefina Hills RN, Yue MAYEN, Annabelle Miller OT, Javon Schwarz PT, Sandoval Valenzuela SLP, Betzy John , Frida Pruitt, Lucia Beltran Dietician      Discharge Barriers/Treatment/Education    Rehab Diagnosis: Subdural hematoma (H)    Active Medical Co-morbidities/Prognosis: left BKA,     Safety:CGA/ cane, has been refusing gait belt    Pain: Denies pain    Medications, Skin, Tubes/Lines: Assess for medication education needs, Has surgical incision R side of the head with staples THOMAS. Has LBKA covered with mepiex. No line or tubes in place  At this time.    Swallowing/Nutrition: At admission, pt on a NDD-2/ thin diet.  Have since upgraded diet to regular/ thin liquids.   Observing occasional throat clearing and coughing on thin liquids, considering VFSS in next days if s/sx continue after introduction of swallow strategies.  Much time has been spent educating pt on potential risks of aspiration, though still pt is still questioning the validity of recommendations.      Bowel/Bladder: Continent of bowel and bladder. [facility] 4/15/18    Psychosocial: Pt is a 73-year-old retired  who lives in an apartment in [place] with his wife, Helena. Pt has adult children that live locally and are available for support.     ADLs/IADLs: Pt is currently SBA for ADL, CGA/SBA for mobility using cane. Pt's balance deficits impacting safety with routines. Pt has cognitive deficits impacting insight into deficits as well. Prior to DC pt will need to be MOD I with ADL routine. Pt's wife does most IADL. Recommend some family training, oversight with medications, bills, transportation.  Recommend OP OT to continue progressing activity especially if he wants to get back to driving.    Mobility: Focusing on standing dynamic balance and core/BLE strengthening proximally to improve overall balance and  "safety during gait. SBA amb in room with SEC. Navigating 4x6\" steps with B rails at [facility]. Pt refusing to use SEC intermittently d/t insistence on not using AD. Intermittently having to side step to L to correct LOB, able to self-correct ~90% of time. Rec OP PT. Pt owns necessary AD for home (2x SEC, 2x w/c's, and 1x FWW).    Cognition/Language:Patient demonstrating mild cognitive deficits in attention and memory, but poor recognition of deficits.  Formal testing placed pt in low-average range for memory, attention, and language.  These results likely reflect a change from personal baseline (pt has high level of education and was a ).  However, very poor insight into deficits, defensiveness, and overall apathy towards therapy tasks negatively impacting pt s potential to benefit from ongoing intervention.  Recommend oversight with medication management and finances and other high-level cognitive tasks.  Likely no ongoing therapy due to lack of patient engagement and buy in.  Community Re-Entry: w/c based    Transportation: recommend pt not safe to drive due to cognition impairments, anticipate car transfer not barrier by d/c.     Decision maker: self    Plan of Care and goals reviewed and updated.    Discharge Plan/Recommendations    Fall Precautions: continue    Overall plan for the patient: Some ongoing progress. Close to up independent in 1-2 days. Swallow study possibly before d/c. Plan home by Friday with initial outpatient PT, OT for sure and possibly SLP if he'll agree to this. CC Thursday.      Utilization Review and Continued Stay Justification    Medical Necessity Criteria:    For any criteria that is not met, please document reason and plan for discharge, transfer, or modification of plan of care to address.    Requires intensive rehabilitation program to treat functional deficits?: Yes    Requires 3x per week or greater involvement of rehabilitation physician to oversee rehabilitation program?: " Yes    Requires rehabilitation nursing interventions?: Yes    Patient is making functional progress?: Yes    There is a potential for additional functional progress? Yes    Patient is participating in therapy 3 hours per day a minimum of 5 days per week or 15 hours per week in 7 day period?:Yes    Has discharge needs that require coordinated discharge planning approach?:Yes      Final Physician Sign off    Statement of Approval: I agree with all the recommendations detailed in this document.     Patient Goals     1. Pt/family goal is for pt to return home with continued family support. Team working towards a safe d/c plan.       OT goal: hygiene/grooming: modified independent, within precautions  OT goal: upper body dressing: Modified independent, including set-up/clothing retrieval, within precautions  OT goal: lower body dressing: Modified independent, including set-up/clothing retrieval, within precautions  OT goal: upper body bathing: Modified independent, within precautions  OT goal: lower body bathing: Modified independent, with precautions  OT goal: toilet transfer/toileting: Modified independent, cleaning and garment management, within precautions  OT goal: cognitive: Patient/caregiver will verbalize understanding of cognitive assessment results/recommendations as needed for safe discharge planning  OT goal 1: Pt will demonstrate (I) with surgical precautions during 100% of ADLs  OT goal 2: Pt will demonstrate walk in shower transfer simulated to home set-up with mod (I)  OT/THOMAS face-to-face collaboration occurred, patient progress and plan of care reviewed.    PT target date for goal attainment: 04/20/18  PT Frequency: 60 min/day  PT goal: bed mobility: Independent  PT goal: transfers: Modified independent, Bed to/from chair, Within precautions, Sit to/from stand  PT goal: gait: Modified independent, Greater than 200 feet  PT goal: stairs: 6 stairs, Modified independent, Rail on left  PT goal: perform  aerobic activity with stable cardiovascular response: 15 minutes, continuous activity, ambulation, NuStep  PT goal 1: Pt will be able to demonstrate and verbalize understanding of 3/3 cranial precautions    SLP target date for goal attainment: 04/17/18  SLP Frequency: daily  SLP Swallow Goal:  Safely tolerate diet without signs/symptoms of aspiration: regular diet, thin liquids, independently, with use of swallow precautions  SLP goal 1: Patient will independently demonstrate the use of compensatory memory strategies to increase functional memory in conversation and in structured tasks to 90% accuracy.  SLP goal 2: Patient will complete complex level reasoning and problem solving tasks with 90% accuracy independently.                                                                       Individualized Goal 1: Pt will participate in MAP prior to discharge to evaluate ability to manage own medications.

## 2018-04-17 NOTE — PLAN OF CARE
Problem: Goal/Outcome  Goal: Goal Outcome Summary  Patient is alert and oriented. Denies any pain or discomfort. Not wanting to use his cane despite encouragements and reminders. Has remained continent of bladder utilizing urinal and toilet. Staff assisting to empty urinal. Scalp incision appears to be healing well without signs of infection. Staples intact, open to air. Will continue with poc.

## 2018-04-17 NOTE — PLAN OF CARE
Problem: Patient Care Overview  Goal: Plan of Care/Patient Progress Review    Repeatable Battery for the Assessment of Neuropsychological Status (RBANS) FORM A   Immediate Memory Visuospatial/Constructional Language Attention Delayed Memory Total Scale   Index Score 97 87 85 75 81 80   Percentile Rank 42 19 16 5 10 9       SLP:  Pt seen for administration of RBANS Form A Results are based on a mean of 100 and a standard deviation of +/- 15.   Interpretation: Patient demonstrating scores in the low-average range for Language, Attention, and Delayed Memory.  During testing, pt repeatedly emphasized that he did not care about test, but typically appeared to be attending to items and completed all tasks.  These results likely reflect a change from personal baseline (pt has high level of education and was a ).  However, very poor insight into deficits, defensiveness, and overall apathy towards therapy tasks negatively impacting pt s potential to benefit from ongoing intervention.   Face to Face Administration: 45  Scoring/Interpretation: 15  Total Time: 55

## 2018-04-17 NOTE — PLAN OF CARE
"Problem: Individualization  Goal: Patient Individualization  Patient A&O x4, LBM yesterday per pt. report, denied CP, lightheadedness, dizziness, numbness, tingling and SOB, coreg held due to HR 52, up to toilet, CGA with cane/gait belt, refused full skin assessment and pt. stated '' already checked earlier\", voiding without difficulties, self repositioned and turned in bed, demonstrates the ability to use call light appropriately, will continue to monitor patient as POC.         "

## 2018-04-18 PROCEDURE — 40000193 ZZH STATISTIC PT WARD VISIT: Performed by: PHYSICAL THERAPIST

## 2018-04-18 PROCEDURE — A9270 NON-COVERED ITEM OR SERVICE: HCPCS | Mod: GY | Performed by: PHYSICAL MEDICINE & REHABILITATION

## 2018-04-18 PROCEDURE — 40000225 ZZH STATISTIC SLP WARD VISIT: Performed by: SPEECH-LANGUAGE PATHOLOGIST

## 2018-04-18 PROCEDURE — 97112 NEUROMUSCULAR REEDUCATION: CPT | Mod: GP | Performed by: PHYSICAL THERAPIST

## 2018-04-18 PROCEDURE — 92507 TX SP LANG VOICE COMM INDIV: CPT | Mod: GN | Performed by: SPEECH-LANGUAGE PATHOLOGIST

## 2018-04-18 PROCEDURE — 40000133 ZZH STATISTIC OT WARD VISIT: Performed by: OCCUPATIONAL THERAPIST

## 2018-04-18 PROCEDURE — 97127 ZZHC SP THERAPEUTIC INTERVENTION W/FOCUS ON COGNITIVE FUNCTION,EA 15 MIN: CPT | Mod: GN | Performed by: SPEECH-LANGUAGE PATHOLOGIST

## 2018-04-18 PROCEDURE — 25000132 ZZH RX MED GY IP 250 OP 250 PS 637: Mod: GY | Performed by: PHYSICAL MEDICINE & REHABILITATION

## 2018-04-18 PROCEDURE — 97110 THERAPEUTIC EXERCISES: CPT | Mod: GP

## 2018-04-18 PROCEDURE — 12800006 ZZH R&B REHAB

## 2018-04-18 PROCEDURE — 40000193 ZZH STATISTIC PT WARD VISIT

## 2018-04-18 PROCEDURE — 92526 ORAL FUNCTION THERAPY: CPT | Mod: GN | Performed by: SPEECH-LANGUAGE PATHOLOGIST

## 2018-04-18 PROCEDURE — 97116 GAIT TRAINING THERAPY: CPT | Mod: GP | Performed by: PHYSICAL THERAPIST

## 2018-04-18 PROCEDURE — 97530 THERAPEUTIC ACTIVITIES: CPT | Mod: GO | Performed by: OCCUPATIONAL THERAPIST

## 2018-04-18 PROCEDURE — 97530 THERAPEUTIC ACTIVITIES: CPT | Mod: GP | Performed by: PHYSICAL THERAPIST

## 2018-04-18 RX ORDER — ACETAMINOPHEN 325 MG/1
650 TABLET ORAL 4 TIMES DAILY PRN
Status: DISCONTINUED | OUTPATIENT
Start: 2018-04-18 | End: 2018-04-20 | Stop reason: HOSPADM

## 2018-04-18 RX ADMIN — LEVETIRACETAM 500 MG: 500 TABLET, FILM COATED ORAL at 20:36

## 2018-04-18 RX ADMIN — LEVETIRACETAM 500 MG: 500 TABLET, FILM COATED ORAL at 07:52

## 2018-04-18 RX ADMIN — ACETAMINOPHEN 650 MG: 325 TABLET ORAL at 17:15

## 2018-04-18 RX ADMIN — GABAPENTIN 600 MG: 600 TABLET, FILM COATED ORAL at 20:36

## 2018-04-18 RX ADMIN — TORSEMIDE 20 MG: 10 TABLET ORAL at 07:52

## 2018-04-18 RX ADMIN — GABAPENTIN 600 MG: 600 TABLET, FILM COATED ORAL at 07:52

## 2018-04-18 RX ADMIN — FLUTICASONE PROPIONATE 2 SPRAY: 50 SPRAY, METERED NASAL at 20:36

## 2018-04-18 RX ADMIN — FLUTICASONE PROPIONATE 2 SPRAY: 50 SPRAY, METERED NASAL at 07:54

## 2018-04-18 RX ADMIN — CARVEDILOL 3.12 MG: 3.12 TABLET, FILM COATED ORAL at 07:53

## 2018-04-18 NOTE — PLAN OF CARE
"Problem: Goal/Outcome  Goal: Goal Outcome Summary  Patient is alert and oriented. Denies any pain or discomfort. Set off alarms three times thus far. One time writer met pt already in bathroom. When writer remarked that pt had forgotten to use his cane, pt responded \" I don't want to use it\" Has remained continent of bowel and  bladder utilizing urinal and toilet. Independent with perineal cares. Staff assisting to empty urinal. Scalp incision appears to be healing well without signs of infection. Staples intact, open to air. Will have a care conference tomorrow.      "

## 2018-04-18 NOTE — PLAN OF CARE
Problem: Patient Care Overview  Goal: Plan of Care/Patient Progress Review  SLP administered analysis/synthesis subtest from WJ-R, pt demonstrated score in the 95th percentile, which is above normal limits. Pt noted to cough with thin liquids x2 this session, in general swallow appeared to be better with chin tuck and small sips. SLP provided education regarding rationale of testing and descriptions of different attention subtypes. Pt initially was resistant to therapy, however, he participated well following education re rationale of additional testing.

## 2018-04-18 NOTE — PLAN OF CARE
FOCUS/GOAL  Bowel management, Bladder management, Pain management, Skin integrity and Cognition/Memory/Judgment/Problem solving    ASSESSMENT, INTERVENTIONS AND CONTINUING PLAN FOR GOAL:  Pt is alert and oriented, denied pain, SOB, or new changes in sensations, during assessment pt seemed to be agitated, used urinal to void, slept throughout the night, repositioned in bed independently during the night, used call light appropriately to call for needs, no further care concerns at this time continue to monitor for bradycardia.

## 2018-04-18 NOTE — PLAN OF CARE
Problem: Goal/Outcome  Goal: Goal Outcome Summary  FOCUS/GOAL  Bladder management, Medication management and Wound care management    ASSESSMENT, INTERVENTIONS AND CONTINUING PLAN FOR GOAL:  Pt alert and oriented x4, VSS, no complaints of pain or SOB. Pt appeared to be somewhat agitated when nurse was completing initial assessment. Nurse came back awhile later and pt appeared to be more receptive to cares. Pt continent of urine this shift with timed toileting offered q 2-3 hrs. Pt's heart rate was a little bradycardic at 50 BPM so his coreg medication was held, pt was asymptomatic. Meplex on distal end of stump changed. Dressing had scant serosang drainage and the skin was pink and blanchable surrounding the stitch. No other concerns at this time, continue POC.

## 2018-04-18 NOTE — PLAN OF CARE
"Problem: Patient Care Overview  Goal: Plan of Care/Patient Progress Review  SLP: continuing to educate pt on rationale for SLp - swallowing and higher level cognitive communication skills. He typically challenges discussion.  Completed subtest WJ-R verbal analogies with pt scoring WNL 92nd percentile.  Will complete additional subtest later this date.  Noted memory/attention deficits prior date.  Does appear to be some reduced insight at times.  Pt noted to cough 1x on thin fluid - appeared to do better with limited additional trials with chin tuck, small sips.  Pt is wanting more OT/PT time, so will reduce to 30 minutes next day.  When discussing possible aspiration on thin liquid \" he states, I would rather cough.\"      "

## 2018-04-18 NOTE — PROGRESS NOTES
"  [facility]   Acute Rehabilitation Unit  Daily progress note    interval history  Antonio Ramirez was seen and examined during therapy.     No acute events overnight, slept well, doing well today, feels that he is ready to go home, denies any new symptoms today and reports the he is \"doing as well as expected\".  No new concerns today     medications  Scheduled meds    carvedilol  3.125 mg Oral BID     fluticasone  2 spray Both Nostrils BID     gabapentin  600 mg Oral BID     levETIRAcetam  500 mg Oral BID     torsemide  20 mg Oral Daily       PRN meds:  acetaminophen, docusate sodium      physical exam  /53 (BP Location: Right arm)  Pulse 58  Temp 96.3  F (35.7  C) (Oral)  Resp 17  Ht 1.854 m (6' 1\")  Wt 86 kg (189 lb 8 oz)  SpO2 97%  BMI 25 kg/m2  Gen: pleasant, lying comfortably in bedl, NAD  HEENT: NC/AT - staples on right side of scalp with incision healing well, no concerns for infection   CV: breathing comfortably on RA, Normal WOB, No distress  Abd: soft, ND, BM's   Ext: no edema or pain in RLE, prosthesis on, did not see small wound today, per nursing it is healing, patient denies any sensation or pain.   Neuro/MSK: alert and oriented, he is conversing well and able to make needs known.  Sensation intact grossly with the excpetion of the LLE distal stump without sensation and decreased proprioception as well.  He is ambulating well, balance is improving     labs  reviewed    assessment and plan  73-year-old with fall and subdural hematoma status post surgical evacuation.  History of antiphospholipid antibody syndrome prior blood clots for which he is chronically on warfarin, also has inferior vena cava filter.  Has past left below-knee amputation.  Admitted to inpatient acute rehab 4/12/18 for continuation of cares.    Changes to care plan: 4/18  - no changes to medications today.   - Cont therapies and cares   - Plan for DC on Friday,     Rehabilitation: " will have 3 hours daily of SLP/PT/OT to assist with recovery and transition back to home.      1. Medical Conditions  # Right frontotemporal parietal subdural hematoma with midline shift 2/2 fall:  S/p craniotomy on 7/7. Occurred due to trauma after fall on 4/4. At the time of arrival on 4/7 he was essentially unresponsive with head CT showing a subdural hematoma on the right with midline shift. Was subsequently taken to OR for frontoparietal craniotomy and evacuation. Post-op CT w/ improvement of midline shift and continued improvement in hemiparesis/dysphagia and mentation. Was on warfarin for his AAS with history of DVT and PE; which is currently on hold.  Neurosurgery recommends holding it for at least 4 weeks until repeat CT is done an outpatient follow-up with neurosurgery before reinitiation of anticoagulation. Patient already has a IVC filter in place.   -  In case of emergent need for anticoagulation prior to this, neurosurgery recommends initiation without bolus.   - Tylenol available for pain prn   - Seizure prophylaxis, keppra 500 mg BID, plan for total of 2 weeks DC on 4/21/18  - Staples to be removed on 4/20/2018, may be done at the ARU       # Antiphospholipid Antibody Syndrome (AAS), IVC filter in place, on chronic anticoagulation:  History of Pulmonary Embolism, Deep Vein Thrombosis:   Will continue to hold PTA warfarin and ASA per neurosurgery recommendations as stated above.    - Patient had DVT/massive PE with saddle embolus about 4-5 years back and not in the recent past.  Already has a IVC filter.   - Neurosurgery strictly recommending against reinitiation of anticoagulation until repeat CT shows stability unless absolutely necessary.  --------- Of note given his antiphospholipid antibody syndrome Coumadin dosing should be based on chromogenic factor 10 when appropriate to resume   - Will not be on any anticoagulation medication while on ARU, will have SCD's for mechanical prophylaxis      #  Paroxysmal atrial fibrillation on chronic anticoagulation with noted slowed ventricular response:   Pt received atropine post operatively for bradycardia HR 30s.  Patient tolerating PTA coreg. At increased dose of 6.25mg (was 3.125mg PTA) due to intermittent elevated BPs and tolerating well.  - Holding PTA warfarin as noted above and should be initiated once cleared by neurosurgery   - Coreg reduced to 3.125mg PO on 4/15 due to soft pressures. Will continue to monitor      # Chronic anemia.  - Hemoglobin  baseline range 8.0-11  -Has been stable around 9-10.  No active signs of bleeding.    - Will cont to monitor      # History of alcohol use disorder.  # Alcoholic cirrhosis with ascites, portal hypertension.  - Drinks approx 1/3rd bottle of vodka a day at home  - No withdrawal symptoms on this past hospital admission  - Will monitor although out of window by this time.   - Dr. Pope to see him if needed       # HFrEF, EF 40-45%.  # Cardiomyopathy.  # Mild valvular aortic stenosis, moderate mitral regurgitation, mild to moderate tricuspid regurgitation, mild ascending aorta dilatation.  # Coronary artery disease s/p 3-v CABG.  # Thoracic aortic aneurysm without rupture.  - BNP on admission 4334  - Continue PTA Coreg - decreased dose to 3.125 on 4/15  - Holding PTA ASA, warfarin as above  - Not on statin PTA due to intolerance  -Continue PTA torsemide.  Patient was euvolemic at the time of admission to ARU      # Hyperlipidemia.  - Does not tolerate statin therapy, not on PTA medication for above diagnosis; will continue to monitor at this time.      # Essential hypertension.  - Current SBP goal <140, currently on coreg and torsemide and tolerating well  -monitor and adjust medication as needed      # Chronic kidney disease, stage 3.  - History of ANSELMO requiring HD in 2010  Renal function has been stable, will monitor intermittently, avoid nephrotoxins      # Suspected urinary tract infection. Resolved  - U/A noted  positive nitrites, large leukocyte esterase and >182 WBC  -Was on Ancef perioperatively.  Urine culture with 50,000- 100,000 CFU Klebsiella and patient currently asymptomatic, no leukocytosis  -Continued to monitor off antibiotics      # Gastroesophageal reflux disease.  - Not on any PTA medications for above diagnosis, will continue to monitor at this time.      # Low grade B cell lymphoproliferative disorder.  History of prostate cancer s/p radiative seed implants.  - No acute issues, will continue to monitor.      # Peripheral polyneuropathy.  - Restarted gabapentin on 4/12 as cognition back at baseline. Patient asymptomatic currently.   - Gabapentin 600mg PO BID      # Discharge Pain Plan:   - Patient currently has NO PAIN and is not being prescribed pain medications on discharge.     2. Adjustment to disability:  Clinical psychology to eval and treat if needed  3. FEN: DDL2 with thin liquids , and magic cup in between meals  4. Bowel: PRN bowel program  5. Bladder: PVR x2 and IC for volumes >400cc  6. DVT Prophylaxis: N/A  7. GI Prophylaxis: N/A  8. Code: Full code   9. Disposition: home likely with increased family support.  10. ELOS:  7-14 days.  11. Rehab prognosis:  good  12. Follow up Appointments on Discharge: PCP, neurosurgery in 4 wks    Kyle Bobby MD PGY-2  Physical Medicine & Rehabilitation  Pager: [phone]

## 2018-04-19 PROCEDURE — 25000132 ZZH RX MED GY IP 250 OP 250 PS 637: Mod: GY | Performed by: PHYSICAL MEDICINE & REHABILITATION

## 2018-04-19 PROCEDURE — 40000225 ZZH STATISTIC SLP WARD VISIT

## 2018-04-19 PROCEDURE — 97535 SELF CARE MNGMENT TRAINING: CPT | Mod: GO | Performed by: OCCUPATIONAL THERAPIST

## 2018-04-19 PROCEDURE — 40000187 ZZH STATISTIC PATIENT MED CONFERENCE < 30 MIN

## 2018-04-19 PROCEDURE — A9270 NON-COVERED ITEM OR SERVICE: HCPCS | Mod: GY | Performed by: PHYSICAL MEDICINE & REHABILITATION

## 2018-04-19 PROCEDURE — 99366 TEAM CONF W/PAT BY HC PROF: CPT

## 2018-04-19 PROCEDURE — 40000193 ZZH STATISTIC PT WARD VISIT

## 2018-04-19 PROCEDURE — 12800006 ZZH R&B REHAB

## 2018-04-19 PROCEDURE — 97116 GAIT TRAINING THERAPY: CPT | Mod: GP

## 2018-04-19 PROCEDURE — 92526 ORAL FUNCTION THERAPY: CPT | Mod: GN

## 2018-04-19 PROCEDURE — 40000133 ZZH STATISTIC OT WARD VISIT: Performed by: OCCUPATIONAL THERAPIST

## 2018-04-19 PROCEDURE — 97530 THERAPEUTIC ACTIVITIES: CPT | Mod: GP

## 2018-04-19 PROCEDURE — 97530 THERAPEUTIC ACTIVITIES: CPT | Mod: GO | Performed by: OCCUPATIONAL THERAPIST

## 2018-04-19 RX ORDER — LEVETIRACETAM 500 MG/1
500 TABLET ORAL 2 TIMES DAILY
Qty: 60 TABLET | Refills: 0 | Status: SHIPPED | OUTPATIENT
Start: 2018-04-19 | End: 2018-05-21

## 2018-04-19 RX ORDER — CARVEDILOL 3.12 MG/1
3.12 TABLET ORAL 2 TIMES DAILY
COMMUNITY
Start: 2018-04-19 | End: 2018-04-19

## 2018-04-19 RX ORDER — FLUTICASONE PROPIONATE 50 MCG
1 SPRAY, SUSPENSION (ML) NASAL 2 TIMES DAILY
COMMUNITY
End: 2018-08-16

## 2018-04-19 RX ADMIN — FLUTICASONE PROPIONATE 2 SPRAY: 50 SPRAY, METERED NASAL at 07:37

## 2018-04-19 RX ADMIN — CARVEDILOL 3.12 MG: 3.12 TABLET, FILM COATED ORAL at 07:37

## 2018-04-19 RX ADMIN — LEVETIRACETAM 500 MG: 500 TABLET, FILM COATED ORAL at 07:36

## 2018-04-19 RX ADMIN — TORSEMIDE 20 MG: 10 TABLET ORAL at 07:36

## 2018-04-19 RX ADMIN — LEVETIRACETAM 500 MG: 500 TABLET, FILM COATED ORAL at 19:41

## 2018-04-19 RX ADMIN — GABAPENTIN 600 MG: 600 TABLET, FILM COATED ORAL at 19:41

## 2018-04-19 RX ADMIN — GABAPENTIN 600 MG: 600 TABLET, FILM COATED ORAL at 07:36

## 2018-04-19 RX ADMIN — FLUTICASONE PROPIONATE 2 SPRAY: 50 SPRAY, METERED NASAL at 19:41

## 2018-04-19 NOTE — CARE CONFERENCE
Acute Rehab Care Conference/Team Rounds      Type: Patient Conference    Present: Patient - Dr. Coy Alvarez, RN Josefina Bernal, SLP Sandoval Valenzuela, PT Davie Schwarz, FAHEEM Albert    Discharge Barriers/Treatment/Education    Rehab Diagnosis: SDH    Active Medical Co-morbidities/Prognosis: paroxysmal afib, prior PE and DVT with IVC filter, alcohol use disorder    Safety: Pt is alert and oriented, independently transferred following extensive education on the importance of using call light prior to transferring, declining use of cane occasionally during transfers, declined skin assessment of R BKA stump dressing, HR bradycardiac between 49-53 on average.     Pain: Pt reported R groin soreness given Tylenol with relief.     Medications, Skin, Tubes/Lines: Pt would benefit from MAP prior to d/c, incision on right side of head approximated with staples intact, Mepilex on R BKA stump, no tubes lines or drains in place.     Swallowing/Nutrition: Upon original admission patient was on a modified diet including thickened liquids and a dysphagia diet.  Patient has been advanced to regular textures and thin liquids despite there being some concerns for swallowing safety with liquid textures.  Patient presenting with frequent throat clearing associated only with liquid intake.  Patient denies any difficulties in swallowing and will not pursue VFSS due to lack of likelihood of affecting a change per patient report.  Patient has been educated on aspiration pneumonia risks and verbalizes understanding.  Wishes to continue with regular texture diet and thin liquids.  Recommend use of general safe swallow strategies including small bites and drinks, upright posture during p.o., as well as use of chin tuck maneuver with swallow.  Patient verbalizes understanding.  No ongoing therapy upon facility discharge.    Bowel/Bladder: Pt remaining continent while on 3 hr times toileting reminders, transfers to toilet to defecate,  using beside urinal during the night to void, LBM 4/18/18.     Psychosocial: Pt is a 73-year-old retired  who lives in an apartment with his wife. He has adult children who live locally and are able to provide support at d/c. Pt/family goal is for pt to return home with continued family support. Team working towards a safe d/c plan.     ADLs/IADLs: Improving nicely, managing basic ADLs but needing some help with other physical aspects and coordination.  Recommend ongoing outpatient occupational therapy    Mobility: Pt making slow, steady gains with standing dynamic balance - will make pt mod I in room today with SEC. Tends to have wide LINETTE during amb but was like this PTA. Need to work on stairs prior to d/c home. Pt owns necessary DME for home (2x SEC, 2 x manual w/c, and FWW). Rec OP PT.    Cognition/Language: Patient completed formal cognitive assessment of RBANS with cumulative score of 80 correlating to the 9th percentile.  While score indicates ability to function with normal ADL tasks, score is likely significantly below baseline given prior education level.  Unclear of current function versus immediately prior function.  Patient feels as though he is at his baseline and cognition is adequate for his needs.  Patient was resistant to any further interventions therefore will be discharged from speech therapy and no ongoing therapy.  Overall does demonstrate limited insight into difficulties would recommend oversight with higher-level cognitive tasks.    Community Re-Entry: rec supervision amb limited community distances 200-400 ft.    Transportation: rec assist from family/friends at d/c.    Decision maker: self    Plan of Care and goals reviewed and updated.    Discharge Plan/Recommendations    Fall Precautions: continue    Patient/Family input to goals: many questions about seizure, blood pressure, heart rate. Baseline was taking torsemide just once daily prior to admission.    Overall plan for the  patient: Sage has made some progress functionally. He's now ambulating better, up with cane. Managing his basic ADL's now independently. There are some impaired cognitive domains but he's not wanting to explore this further. Ideally we'd continue with some ongoing outpatient therapy. Sage is not interested in SLP for sure. Send PT and OT orders to [facility]. Will have f/u with neurosurgery 5/2. Should have f/u with his primary provider and cardiology.       Utilization Review and Continued Stay Justification    Medical Necessity Criteria:    For any criteria that is not met, please document reason and plan for discharge, transfer, or modification of plan of care to address.    Requires intensive rehabilitation program to treat functional deficits?: Yes    Requires 3x per week or greater involvement of rehabilitation physician to oversee rehabilitation program?: Yes    Requires rehabilitation nursing interventions?: Yes    Patient is making functional progress?: Yes    There is a potential for additional functional progress? Yes    Patient is participating in therapy 3 hours per day a minimum of 5 days per week or 15 hours per week in 7 day period?:Yes    Has discharge needs that require coordinated discharge planning approach?:Yes      Final Physician Sign off    Statement of Approval: I agree with all the recommendations detailed in this document.     Patient Goals             OT target date for goal attainment: 04/19/18  OT Frequency: daily  OT goal: hygiene/grooming: modified independent, within precautions  OT goal: upper body dressing: Modified independent, including set-up/clothing retrieval, within precautions  OT goal: lower body dressing: Modified independent, including set-up/clothing retrieval, within precautions  OT goal: upper body bathing: Modified independent, within precautions  OT goal: lower body bathing: Modified independent, with precautions  OT goal: toilet transfer/toileting: Modified independent,  cleaning and garment management, within precautions  OT goal: cognitive: Patient/caregiver will verbalize understanding of cognitive assessment results/recommendations as needed for safe discharge planning  OT goal 1: Pt will demonstrate (I) with surgical precautions during 100% of ADLs  OT goal 2: Pt will demonstrate walk in shower transfer simulated to home set-up with mod (I)     PT target date for goal attainment: 04/20/18  PT Frequency: 60 min/day  PT goal: bed mobility: Independent  PT goal: transfers: Modified independent, Bed to/from chair, Within precautions, Sit to/from stand  PT goal: gait: Modified independent, Greater than 200 feet  PT goal: stairs: 6 stairs, Modified independent, Rail on left  PT goal: perform aerobic activity with stable cardiovascular response: 15 minutes, continuous activity, ambulation, NuStep  PT goal 1: Pt will be able to demonstrate and verbalize understanding of 3/3 cranial precautions    SLP target date for goal attainment: 04/17/18  SLP Frequency: daily  SLP Swallow Goal:  Safely tolerate diet without signs/symptoms of aspiration: regular diet, thin liquids, independently, with use of swallow precautions  SLP goal 1: Patient will independently demonstrate the use of compensatory memory strategies to increase functional memory in conversation and in structured tasks to 90% accuracy.  SLP goal 2: Patient will complete complex level reasoning and problem solving tasks with 90% accuracy independently.      Individualized Goal 1: Pt will participate in MAP prior to discharge to evaluate ability to manage own medications.

## 2018-04-19 NOTE — PLAN OF CARE
"Problem: Patient Care Overview  Goal: Plan of Care/Patient Progress Review  Physical Therapy Discharge Summary    Reason for therapy discharge:    Discharged to home with outpatient therapy.    Progress towards therapy goal(s). See goals on Care Plan in [facility] electronic health record for goal details.  Pt safe to be mod I amb with SEC or without AD short home distances but requires supervision ambulating further distances. SBA navigating stairs however pt and wife reporting there are no stairs he has to navigate at home.    Therapy recommendation(s):    Continued therapy is recommended.  Rationale/Recommendations:  OP PT to progress core strength, B hip strength, standing dynamic balance to reduce risk of falling and maintain independence with mobility.     Summary - pt mod I in room with SEC; however, he tends to furniture walk and not use SEC instead. Pt safe to do this short distances ie apartment distances, however benefits from UE support (acevedo rail or SEC) to amb further distances than 50 ft. Navigated 12x6\" steps, B rails, SBA. Pt tends to be resistant to suggestions to improve safety with mobility, often saying, \"I don't need that\" or \"What good is that really going to do me?\" He tends to have a very sarcastic, dry sense of humor, but with persuasion, he is willing to participate.    DME - pt owns manual w/c x2, FWW, SEC x 2.        "

## 2018-04-19 NOTE — PLAN OF CARE
Problem: Patient Care Overview  Goal: Plan of Care/Patient Progress Review  Discharge Planner OT   Patient plan for discharge: Home with supervision and assist from wife.  Current status: Pt is progressing and is close to being mod I in room.  Barriers to return to prior living situation: Decreased cognition and impaired balance.  Recommendations for discharge: Pt will need supervision at home. Recommend OP OT.  Rationale for recommendations: Pt will be ready to discharge to home with supervision for safety.       Entered by: Juliette Sofia 04/19/2018 12:42 PM

## 2018-04-19 NOTE — PLAN OF CARE
Problem: Goal/Outcome  Goal: Goal Outcome Summary  Alert and oriented.  Pt is now mod I in room with walker.Had a care conference today. Plan to discharge home tomorrow.  Will continue to foster independence and care per poc.

## 2018-04-19 NOTE — PLAN OF CARE
Problem: Patient Care Overview  Goal: Plan of Care/Patient Progress Review  Speech Language Therapy Discharge Summary    Reason for therapy discharge:    Patient/family request discontinuation of services.    Progress towards therapy goal(s). See goals on Care Plan in [facility] electronic health record for goal details.  Goals partially met.  Barriers to achieving goals:   discharge from facility.    Therapy recommendation(s):    No further therapy is recommended.       Patient is on regular texture and thin liquids diet and fully independent with his meals.  Patient does continue to show throat clearing associated with p.o. intake.  Prior to starting any p.o., no throat clearing noted.  Once p.o. intake was initiated evident that throat clearing started.  Patient denies any swallowing difficulties and while able to verbalize understanding of education regarding aspiration pneumonia, patient denying that he has any of the risk factors.  Patient will discharge with current regular texture diet and thin liquids.  Encourage patient to continue with safe swallow strategies of small bites and sips along with use of chin tuck.  VFSS will not be pursued due to lack of potential changes resulting from findings.  Will discharge from speech therapy with no ongoing speech upon discharge.

## 2018-04-19 NOTE — PROGRESS NOTES
"  [facility]   Acute Rehabilitation Unit  Daily progress note    interval history  Antonio Ramirez was seen and examined at bedside.     No acute events overnight, slept well, self transferred last night to use restroom discussed this with him, after I left the room he took him self to restroom again without assistance, discussed with therapy, will plan to make him mod I in room today.  CC this afternoon, plan for DC home tomorrow, Had some msk pain in right groin yesterday, tylenol helped, has avail prn if needed, staples out tomorrow.       medications  Scheduled meds    carvedilol  3.125 mg Oral BID     fluticasone  2 spray Both Nostrils BID     gabapentin  600 mg Oral BID     levETIRAcetam  500 mg Oral BID     torsemide  20 mg Oral Daily       PRN meds:  acetaminophen, docusate sodium      physical exam  /65 (BP Location: Left arm)  Pulse 57  Temp 96.8  F (36  C) (Oral)  Resp 16  Ht 1.854 m (6' 1\")  Wt 90.5 kg (199 lb 8 oz)  SpO2 98%  BMI 26.32 kg/m2  Gen: pleasant, lying comfortably in bed, NAD  HEENT: NC/AT - staples on right side of scalp with incision healing well, no concerns for infection   CV: breathing comfortably on RA, Normal WOB, No distress  Abd: soft, ND, BM's   Ext: no edema or pain in RLE, prosthesis on, did not see small wound today, per nursing it is healing, patient denies any sensation or pain.   Neuro/MSK: alert and oriented, he is conversing well and able to make needs known.  Sensation intact grossly with the excpetion of the LLE distal stump without sensation and decreased proprioception.  He is ambulating well, balance is improving he is likely switching to mod I in room     labs  reviewed    assessment and plan  73-year-old with fall and subdural hematoma status post surgical evacuation.  History of antiphospholipid antibody syndrome prior blood clots for which he is chronically on warfarin, also has inferior vena cava filter.  Has past " left below-knee amputation.  Admitted to inpatient acute rehab 4/12/18 for continuation of cares.    Changes to care plan: 4/18  - no changes to medications today.   - Cont therapies and cares   - Plan for DC on Friday, staples out prior to DC      Rehabilitation: will have 3 hours daily of SLP/PT/OT to assist with recovery and transition back to home.      1. Medical Conditions  # Right frontotemporal parietal subdural hematoma with midline shift 2/2 fall:  S/p craniotomy on 7/7. Occurred due to trauma after fall on 4/4. At the time of arrival on 4/7 he was essentially unresponsive with head CT showing a subdural hematoma on the right with midline shift. Was subsequently taken to OR for frontoparietal craniotomy and evacuation. Post-op CT w/ improvement of midline shift and continued improvement in hemiparesis/dysphagia and mentation. Was on warfarin for his AAS with history of DVT and PE; which is currently on hold.  Neurosurgery recommends holding it for at least 4 weeks until repeat CT is done an outpatient follow-up with neurosurgery before reinitiation of anticoagulation. Patient already has a IVC filter in place.   -  In case of emergent need for anticoagulation prior to this, neurosurgery recommends initiation without bolus.   - Tylenol available for pain prn   - Seizure prophylaxis, keppra 500 mg BID, plan for total of 2 weeks DC on 4/21/18  - Staples to be removed on 4/20/2018, may be done at the ARU       # Antiphospholipid Antibody Syndrome (AAS), IVC filter in place, on chronic anticoagulation:  History of Pulmonary Embolism, Deep Vein Thrombosis:   Will continue to hold PTA warfarin and ASA per neurosurgery recommendations as stated above.    - Patient had DVT/massive PE with saddle embolus about 4-5 years back and not in the recent past.  Already has a IVC filter.   - Neurosurgery strictly recommending against reinitiation of anticoagulation until repeat CT shows stability unless absolutely  necessary.  --------- Of note given his antiphospholipid antibody syndrome Coumadin dosing should be based on chromogenic factor 10 when appropriate to resume   - Will not be on any anticoagulation medication while on ARU, will have SCD's for mechanical prophylaxis      # Paroxysmal atrial fibrillation on chronic anticoagulation with noted slowed ventricular response:   Pt received atropine post operatively for bradycardia HR 30s.  Patient tolerating PTA coreg. At increased dose of 6.25mg (was 3.125mg PTA) due to intermittent elevated BPs and tolerating well.  - Holding PTA warfarin as noted above and should be initiated once cleared by neurosurgery   - Coreg reduced to 3.125mg PO on 4/15 due to soft pressures. Will continue to monitor      # Chronic anemia.  - Hemoglobin  baseline range 8.0-11  -Has been stable around 9-10.  No active signs of bleeding.    - Will cont to monitor      # History of alcohol use disorder.  # Alcoholic cirrhosis with ascites, portal hypertension.  - Drinks approx 1/3rd bottle of vodka a day at home  - No withdrawal symptoms on this past hospital admission  - Will monitor although out of window by this time.   - Dr. Pope to see him if needed       # HFrEF, EF 40-45%.  # Cardiomyopathy.  # Mild valvular aortic stenosis, moderate mitral regurgitation, mild to moderate tricuspid regurgitation, mild ascending aorta dilatation.  # Coronary artery disease s/p 3-v CABG.  # Thoracic aortic aneurysm without rupture.  - BNP on admission 4334  - Continue PTA Coreg - decreased dose to 3.125 on 4/15  - Holding PTA ASA, warfarin as above  - Not on statin PTA due to intolerance  -Continue PTA torsemide.  Patient was euvolemic at the time of admission to ARU      # Hyperlipidemia.  - Does not tolerate statin therapy, not on PTA medication for above diagnosis; will continue to monitor at this time.      # Essential hypertension.  - Current SBP goal <140, currently on coreg and torsemide and tolerating  well  -monitor and adjust medication as needed      # Chronic kidney disease, stage 3.  - History of ANSELMO requiring HD in 2010  Renal function has been stable, will monitor intermittently, avoid nephrotoxins      # Suspected urinary tract infection. Resolved  - U/A noted positive nitrites, large leukocyte esterase and >182 WBC  -Was on Ancef perioperatively.  Urine culture with 50,000- 100,000 CFU Klebsiella and patient currently asymptomatic, no leukocytosis  -Continued to monitor off antibiotics      # Gastroesophageal reflux disease.  - Not on any PTA medications for above diagnosis, will continue to monitor at this time.      # Low grade B cell lymphoproliferative disorder.  History of prostate cancer s/p radiative seed implants.  - No acute issues, will continue to monitor.      # Peripheral polyneuropathy.  - Restarted gabapentin on 4/12 as cognition back at baseline. Patient asymptomatic currently.   - Gabapentin 600mg PO BID      # Discharge Pain Plan:   - Patient currently has NO PAIN and is not being prescribed pain medications on discharge.     2. Adjustment to disability:  Clinical psychology to eval and treat if needed  3. FEN: DDL2 with thin liquids , and magic cup in between meals  4. Bowel: PRN bowel program  5. Bladder: PVR x2 and IC for volumes >400cc  6. DVT Prophylaxis: N/A  7. GI Prophylaxis: N/A  8. Code: Full code   9. Disposition: home likely with increased family support.  10. ELOS:  7-14 days.  11. Rehab prognosis:  good  12. Follow up Appointments on Discharge: PCP, neurosurgery in 4 wks    Kyel Bobby MD PGY-2  Physical Medicine & Rehabilitation  Pager: [phone]

## 2018-04-19 NOTE — PLAN OF CARE
"Problem: Goal/Outcome  Goal: Goal Outcome Summary  Pt a&ox4. SBA using cane however pt declines use of cane occasionally. Pt complained of pain in R groin, \"I think I over stretched the muscle in therapy.\" At first was requesting Oxycodone but after discussing options with PMR resident pt decided Tylenol would be OK. Received 650mg of Tylenol with relief. Bed extender brought in, bed alarms not working at this time, discussed with pt the importance/safety reasons and that he should still call before getting out of bed, pt stated \"i dont really see the reason to why I need to.\" Pt was then found in the bathroom after self transferring. Continue to educate pt. Incision on head CDI closed with staples, declined assessment of R BKA. HR running 77-70-rwukoyd manually, BP also soft. Pt asymptomatic and denies chest pain or SOB. Held Coreg at hs. Will continue to monitor this.  anticipate cc tomorrow and dc Friday 4/20.       "

## 2018-04-19 NOTE — PROGRESS NOTES
IRF-PATTI CLARIFICATION NOTE FOR ADMIT ASSESSMENT PERIOD  Lowest score for each FIM item supported by available charting    Lower Body Dressing: FIM 1: Charting describes pt requiring assist for over 75% of lower body dressing task  Toileting: FIM 3: Nursing charted mod assist required for toileting  Bladder:  FIM 1: Charting describes pt requiring total assist to clean up following incontinence episode  Bladder Number of Accidents: 2  Bowel: FIM 1: Charting describes pt requiring total assist to clean up following incontinence episode  Bowel Number of Accidents: 1  Transfer: FIM 3: Charting describes lifting assist required during transfer  Toilet transfer: FIM 3: Charting describes lifting assist for toilet transfer  Locomotion distance:  40 feet prior to break  Locomotion: FIM 1: Charting by OT describes pt ambulating less than 50 feet; fatigue and standing break needed  Stairs: FIM 2: 4-8 steps with CGA  Comprehension: FIM 5: Charting describes pt with basic comprehension; assist less than 10% of time  Expression: FIM 6: Increased time/slow to respond  Problem solving: FIM 4: Charting describes pt requiring assist for basic problem solving surrounding bowel/bladder care; assist less than 25% of time  Memory: FIM 4: Charting indicates pt with decreased short term memory; assist described less than 25% of time    GG Items Clarification: Verbal clarification with PT/OT  1 Step (Curb):   Object:

## 2018-04-20 VITALS
TEMPERATURE: 97.5 F | RESPIRATION RATE: 16 BRPM | DIASTOLIC BLOOD PRESSURE: 57 MMHG | BODY MASS INDEX: 26.44 KG/M2 | OXYGEN SATURATION: 97 % | HEART RATE: 58 BPM | HEIGHT: 73 IN | SYSTOLIC BLOOD PRESSURE: 124 MMHG | WEIGHT: 199.5 LBS

## 2018-04-20 PROCEDURE — 25000132 ZZH RX MED GY IP 250 OP 250 PS 637: Mod: GY | Performed by: PHYSICAL MEDICINE & REHABILITATION

## 2018-04-20 PROCEDURE — A9270 NON-COVERED ITEM OR SERVICE: HCPCS | Mod: GY | Performed by: PHYSICAL MEDICINE & REHABILITATION

## 2018-04-20 RX ADMIN — GABAPENTIN 600 MG: 600 TABLET, FILM COATED ORAL at 08:10

## 2018-04-20 RX ADMIN — LEVETIRACETAM 500 MG: 500 TABLET, FILM COATED ORAL at 08:09

## 2018-04-20 RX ADMIN — TORSEMIDE 20 MG: 10 TABLET ORAL at 08:09

## 2018-04-20 RX ADMIN — FLUTICASONE PROPIONATE 2 SPRAY: 50 SPRAY, METERED NASAL at 08:10

## 2018-04-20 NOTE — PLAN OF CARE
Problem: Goal/Outcome  Goal: Goal Outcome Summary  Pt a&ox4. Denied pain. Independent in room using cane. Dressing on L BKA changed and leg washed. Prostetic reapplied after. Pt reports being continent of b&b using the toilet in the br. Incision to scalp CDI, closed with staples that are due to be removed prior to DC tomorrow. Took medications whole with thin liquids, no concerns. Anticipate discharge home tomorrow with wife. DC medications on unit.

## 2018-04-20 NOTE — PROGRESS NOTES
VSS. Discharge instructions discussed with patient and spouse. Patient and spouse verbalized understanding of discharge instructions. Patient safely transferred from  to car for ride home at 1115.

## 2018-04-20 NOTE — PLAN OF CARE
Problem: Goal/Outcome  Goal: Goal Outcome Summary  FOCUS/GOAL  Medical management    ASSESSMENT, INTERVENTIONS AND CONTINUING PLAN FOR GOAL:  Patient alert and oriented and able to make his needs known. Patient has appeared to be sleeping well during overnight rounds. He is modified independent in his room with SEC. Patient is continent and has been calling appropriately for staff to empty his bedside urinal. Plan is for patient to discharge today. Patient continues to have staples to incision and plan is to have them removed prior to discharge. Medications are on unit and ready for patient.

## 2018-04-20 NOTE — DISCHARGE SUMMARY
[facility]   Acute Rehabilitation Unit  Discharge summary     Date of Admission: 4/12/2018  Date of Discharge: 4/20/18  Disposition: Home with wife  Primary Care Physician: Main Hardy  Attending physician: Coy Us MD  Other significant physician provider(s): Kyle Bobby MD      discharge diagnosis      Subdural hematoma    Impaired balance, cognition, and dysphagia, with decreased proprioception in Left leg.     Antiphospholipid antibody syndrome, A-Fib, Chronic anemia, Heart Failure, HTN/HLD, CKD III, GERD, UTI, BKA on L, and Peripheral neuropathy    brief summary  (Copied from [facility])  Antonio Ramirez is a 73 year old male with a PMHx of with a complex PMH antiphospholipid antibody syndrome s/p IVC filter placement and previously on coumadin, including alcohol abuse, left BKA, balance problems, and cardiomyopathy who presented to the ED via EMS after a fall on 4/7/18. Patient had refused to be taken to the hospital via EMS after initial evaluation. Patient began to get increasingly lethargic and wife called EMS again to transport patient to the ER. He was essentially unresponsive at this time. Head CT showed a subdural hematoma on the right with a leftward shift. Patient was taken to the OR for evacuation and underwent craniotomy. Placed in SICU post-surgery. Post-op CT showed mild improvement of midline shift and continued improvement in hemipareshis/dysphagia and mentation. Anticoag meds held. Put on Keppra 500mg BID for seizures prophylaxis. Roxicodone on board, but patient has not needed any pain medication in last 48 hrs.     During his acute hospitalization, patient was seen and evaluated by PT, OT, SLP.  All specialties collectively recommended that patient would benefit from ongoing therapies in the acute inpatient rehabilitation setting.       In review of the therapy notes, patient would benefit from assitance with ADLs. Continues to have trouble with  balance and assist needed to open cap from toothpaste. Still needs min/mod assist to stand and ambulates with FWW SANDEEP to/from the bathroom. Noted to have symptoms of penetration/aspiration after a solid and with large bolus size. Is able to tolerate trials of semi-soft textures. Diet was advanced to DDL 2 with thin liquids. He requires SBA for bed mobility and transfers with a FWW. Has gait training with FWW and L prosthesis but still having some trouble with balance.       rehabilitaiton course  Swallowing/Nutrition: Upon original admission patient was on a modified diet including thickened liquids and a dysphagia diet.  Patient has been advanced to regular textures and thin liquids despite there being some concerns for swallowing safety with liquid textures.  Patient presenting with frequent throat clearing associated only with liquid intake.  Patient denies any difficulties in swallowing and will not pursue VFSS due to lack of likelihood of affecting a change per patient report.  Patient has been educated on aspiration pneumonia risks and verbalizes understanding.  Wishes to continue with regular texture diet and thin liquids.  Recommend use of general safe swallow strategies including small bites and drinks, upright posture during p.o., as well as use of chin tuck maneuver with swallow.  Patient verbalizes understanding.  No ongoing therapy upon facility discharge.    Mobility: Pt making slow, steady gains with standing dynamic balance - will make pt mod I in room today with SEC. Tends to have wide LINETTE during amb but was like this PTA. Need to work on stairs prior to d/c home. Pt owns necessary DME for home (2x SEC, 2 x manual w/c, and FWW). Rec OP PT.     Cognition/Language: Patient completed formal cognitive assessment of RBANS with cumulative score of 80 correlating to the 9th percentile.  While score indicates ability to function with normal ADL tasks, score is likely significantly below baseline given  prior education level.  Unclear of current function versus immediately prior function.  Patient feels as though he is at his baseline and cognition is adequate for his needs.  Patient was resistant to any further interventions therefore will be discharged from speech therapy and no ongoing therapy.  Overall does demonstrate limited insight into difficulties would recommend oversight with higher-level cognitive tasks.    mEDICAL COURSE  # Right frontotemporal parietal subdural hematoma with midline shift 2/2 fall:  S/p craniotomy on 7/7. Occurred due to trauma after fall on 4/4. At the time of arrival on 4/7 he was essentially unresponsive with head CT showing a subdural hematoma on the right with midline shift. Was subsequently taken to OR for frontoparietal craniotomy and evacuation.   - Post-op CT w/ improvement of midline shift and continued improvement in hemiparesis/dysphagia and mentation.   - Was on warfarin for his AAS with history of DVT and PE; which is currently on hold.    - Neurosurgery recommends holding it for at least 4 weeks until repeat CT is done as outpatient at time of follow-up with neurosurgery.   - Patient already has a IVC filter in place. BUT In case of emergent need for anticoagulation prior to appointment, neurosurgery recommends initiation without bolus.   - Tylenol available for pain prn   - Seizure prophylaxis, keppra 500 mg BID, plan for total of 2 weeks DC on 4/21/18  - Staples removed on 4/20/2018 prior to DC      # Antiphospholipid Antibody Syndrome (AAS), IVC filter in place, on chronic anticoagulation:  History of Pulmonary Embolism, Deep Vein Thrombosis:   Will continue to hold PTA warfarin and ASA per neurosurgery recommendations as stated above.    - Patient had DVT/massive PE with saddle embolus about 4-5 years back and not in the recent past.  Already has a IVC filter.   - Neurosurgery strictly recommending against reinitiation of anticoagulation until repeat CT shows  stability unless absolutely necessary.  --------- Of note given his antiphospholipid antibody syndrome Coumadin dosing should be based on chromogenic factor 10 when appropriate to resume   - discharge from ARU without anticoagulation, will follow-up and re-start once cleared to do so with Neurosurgery.       # Paroxysmal atrial fibrillation on chronic anticoagulation with noted slowed ventricular response:   Pt received atropine post operatively for bradycardia HR 30s.  Patient tolerating PTA coreg. At increased dose of 6.25mg (was 3.125mg PTA) due to intermittent elevated BPs and tolerating well.  - Holding PTA warfarin as noted above and should be initiated once cleared by neurosurgery   - Coreg reduced to 3.125mg PO on 4/15 due to soft pressures and Low HR, still was not meeting parameters to give medication, thus he was discharged without Coreg, plan to have patient check BP and HR regularly and follow-up with PCP       # Chronic anemia.  - Hemoglobin  baseline range 8.0-11  -Has been stable around 9-10.  No active signs of bleeding, and no concern at time of DC      # History of alcohol use disorder.  # Alcoholic cirrhosis with ascites, portal hypertension.  - Drinks approx 1/3rd bottle of vodka a day at home  - No withdrawal symptoms on this past hospital admission or during ARU stay  - Advised patient to abstain from alcohol as outpatient and seek treatment if he is unable to abstain from consuming alcohol     # HFrEF, EF 40-45%.  # Cardiomyopathy.  # Mild valvular aortic stenosis, moderate mitral regurgitation, mild to moderate tricuspid regurgitation, mild ascending aorta dilatation.  # Coronary artery disease s/p 3-v CABG.  # Thoracic aortic aneurysm without rupture.  - BNP on admission 4334  - PTA Coreg - decreased dose to 3.125 on 4/15, then stopped at time of DC - pt to see and discuss further need with PCP  - Holding PTA ASA, warfarin as above  - Not on statin PTA due to intolerance  -Continue PTA  torsemide.  Patient was euvolemic at the time of discharge from ARU      # Hyperlipidemia.  - Does not tolerate statin therapy, not on PTA medication for above diagnosis;       # Essential hypertension.  - Current SBP goal <140, currently on coreg and torsemide and tolerating well      # Chronic kidney disease, stage 3.  - History of ANSELMO requiring HD in 2010  Renal function has been stable, avoid nephrotoxins      # Suspected urinary tract infection. Resolved  - U/A noted positive nitrites, large leukocyte esterase and >182 WBC  -Was on Ancef perioperatively.  Urine culture with 50,000- 100,000 CFU Klebsiella and patient currently asymptomatic, no leukocytosis      # Gastroesophageal reflux disease.  - Not on any PTA medications for above diagnosis, .      # Low grade B cell lymphoproliferative disorder.  History of prostate cancer s/p radiative seed implants.  - No acute issues,       # Peripheral polyneuropathy.  - Restarted gabapentin on 4/12 as cognition back at baseline. Patient asymptomatic currently.   - Gabapentin 600mg PO BID, continue home dose as outpatient     dISCHARGE MEDICATIONS  Current Discharge Medication List      START taking these medications    Details   Blood Pressure Monitoring (BLOOD PRESSURE MONITOR/M CUFF) MISC 1 each once for 1 dose  Qty: 1 each, Refills: 0    Associated Diagnoses: Essential hypertension         CONTINUE these medications which have CHANGED    Details   levETIRAcetam (KEPPRA) 500 MG tablet Take 1 tablet (500 mg) by mouth 2 times daily  Qty: 60 tablet, Refills: 0    Associated Diagnoses: Subdural hemorrhage (H)         CONTINUE these medications which have NOT CHANGED    Details   fluticasone (FLONASE) 50 MCG/ACT spray Spray 2 sprays into both nostrils daily      GABAPENTIN PO Take 600 mg by mouth 2 times daily      torsemide (DEMADEX) 20 MG tablet Take 20 mg by mouth daily         STOP taking these medications       carvedilol (COREG) 6.25 MG tablet Comments:   Reason for  Stopping:                 DISCHARGE INSTRUCTIONS AND FOLLOW UP    Discharge Procedure Orders  Occupational Therapy Referral   Referral Type: Occupational Therapy     Physical Therapy Referral   Referral Type: Rehab Therapy Physical Therapy     Reason for your hospital stay   Order Comments: You were admitted to acute rehab for a brain bleed (subdural hematoma) which required neurosurgical intervention.  You recovered well and are being discharged home with instructions to continue with therapy.     Adult [facility] Follow-up and recommended labs and tests   Order Comments: Follow up with primary care provider, Main Hardy, within 7 days to evaluate medication change, to evaluate treatment change and to evaluate after surgery.  No follow up labs or test are needed.    Follow-up in 4 weeks with Neurosurgery in clinic       Appointments on [place] and/or [facility] (with [facility] or [facility] provider or service). Call [phone] if you haven't heard regarding these appointments within 7 days of discharge.     Activity   Order Comments: Your activity upon discharge: activity as tolerated pe therapy recommendations, no driving until cleared to do so by your physician   Order Specific Question Answer Comments   Is discharge order? Yes      Wound care and dressings   Order Comments: Instructions to care for your wound at home: can shower with water running over the top of the wound, do not submerge under water, can leave open to air.     Full Code     Diet   Order Comments: Follow this diet upon discharge: Orders Placed This Encounter     Room Service     Regular Diet Adult   Order Specific Question Answer Comments   Is discharge order? Yes             physical examination    Most recent Vital Signs:   Vitals:    04/19/18 1455 04/19/18 1502 04/19/18 1554 04/20/18 0705   BP: 109/50 112/59 112/53 124/57   BP Location: Left arm  Left arm Left arm   Pulse:   58    Resp:    16   Temp:   96.8  F (36  C) 97.5  F (36.4  C)    TempSrc:   Oral Oral   SpO2: 99%  97% 97%   Weight:       Height:           Gen: pleasant, lying comfortably in bed, NAD  HEENT: NC/AT - staples removed, with incision healing well, no issues or concerns for infection.  CARDIO: Rate controlled with irregular rhythm, slight systolic murmur appreciated,   RESP: CTAB, no crackles or wheeze,   Abd: soft, ND/NT, BM's BS+  Ext: no edema or pain in RLE, prosthesis on, ambulating well, no sensation in left lower leg/knee and distal  Neuro/MSK: alert and oriented, he is conversing well and able to make needs known.  Sensation intact grossly with the excpetion of the LLE distal stump without sensation and decreased proprioception.  He is ambulating well, balance is improving he is likely switching to mod I in room   Strength is at or near baseline with symmetrical 5/5 in UE, and in LE at HF and KE, and then 5/5 with RLE PF/DF/EHL.     Discharge summary was forwarded to Main Hardy (PCP) at the time of discharge, so as to bridge from hospital to outpatient care.     It was our pleasure to care for Antonio Ramirez during this hospitalization. Please do not hesitate to contact me should there be questions regarding the hospital course or discharge plan.      Kyle Bobby MD PGY-2  Physical Medicine & Rehabilitation    Physician Attestation   I, Coy Us, saw and evaluated this patient prior to discharge.  I discussed the patient with the resident and agree with plan of care as documented in the resident note.    I personally reviewed vital signs, medications and labs.  Feeling well and ready to get home.  Staples in scalp removed today.  Education around prosthesis management  Greater than 30 minutes spent with discharge.    Coy Us  Date of Service (when I saw the patient): 04/20/18

## 2018-04-20 NOTE — PROGRESS NOTES
OK to discharge ome today. Staples out of scalp this AM, looks fine.  F/u wit neurosurgery scheduled.  Continue with outpatient PT and OT. Not wanting SLP which is reasonable.  Bradycardic, irregular, slight murmur  See full d/c summary.

## 2018-04-24 ENCOUNTER — HOSPITAL ENCOUNTER (OUTPATIENT)
Dept: PHYSICAL THERAPY | Facility: CLINIC | Age: 73
Setting detail: THERAPIES SERIES
End: 2018-04-24
Attending: PHYSICAL MEDICINE & REHABILITATION
Payer: MEDICARE

## 2018-04-24 PROCEDURE — G8979 MOBILITY GOAL STATUS: HCPCS | Mod: GP,CK | Performed by: PHYSICAL THERAPIST

## 2018-04-24 PROCEDURE — 40000719 ZZHC STATISTIC PT DEPARTMENT NEURO VISIT: Performed by: PHYSICAL THERAPIST

## 2018-04-24 PROCEDURE — 97162 PT EVAL MOD COMPLEX 30 MIN: CPT | Mod: GP | Performed by: PHYSICAL THERAPIST

## 2018-04-24 PROCEDURE — G8978 MOBILITY CURRENT STATUS: HCPCS | Mod: GP,CL | Performed by: PHYSICAL THERAPIST

## 2018-04-24 PROCEDURE — 97110 THERAPEUTIC EXERCISES: CPT | Mod: GP | Performed by: PHYSICAL THERAPIST

## 2018-04-24 ASSESSMENT — 6 MINUTE WALK TEST (6MWT): TOTAL DISTANCE WALKED (FT): 600

## 2018-04-24 NOTE — PROGRESS NOTES
IRF-PATTI CLARIFICATION NOTE FOR ADMIT ASSESSMENT PERIOD  Lowest score for each FIM item supported by available charting      Problem solving: FIM 5. Charting indicates patient was difficult to engage in conversation.  Verbal clarification with RN indicates patient needed encouragement to participate.

## 2018-04-24 NOTE — PROGRESS NOTES
04/24/18 1600   Quick Adds   Type of Visit Initial OP PT Evaluation   General Information   Start of Care Date 04/24/18   Referring Physician Dr. Coy Us   Orders Evaluate and Treat as Indicated   Order Date 04/19/18   Medical Diagnosis Subdural Hemorrhage   Onset of illness/injury or Date of Surgery 04/07/18   Surgical/Medical history reviewed Yes   Pertinent history of current problem (include personal factors and/or comorbidities that impact the POC) Antonio Ramirez is a 73 year old male with a PMHx of with a complex PMH antiphospholipid antibody syndrome s/p IVC filter placement and previously on coumadin, including alcohol abuse, left BKA, balance problems, and cardiomyopathy who presented to the ED via EMS after a fall on 4/7/18. Patient had refused to be taken to the hospital via EMS after initial evaluation. Patient began to get increasingly lethargic and wife called EMS again to transport patient to the ER. He was essentially unresponsive at this time. Head CT showed a subdural hematoma on the right with a leftward shift. Patient was taken to the OR for evacuation and underwent craniotomy. Placed in SICU post-surgery. Post-op CT showed mild improvement of midline shift and continued improvement in hemipareshis/dysphagia and mentation.  Discharged to ARU from 4/12/18 to 4/20/18. Continued PT recommended for balance and strengthening.    Prior level of function comment Goes to Pilates 2x/week at [employer] [facility] Does some walking in the hallways of [facility] building.     Patient role/Employment history Retired   Living environment Apartment/condo   Home/Community Accessibility Comments Lives with wife. [facility] does have an exercise room but has not used it.     Current Assistive Devices Standard Cane   Patient/Family Goals Statement improve himself so he can get R hip replaced. Hoping to get this done in May    Fall Risk Screen   Fall screen completed by PT   Have you fallen 2 or more times in the past year? Yes    Have you fallen and had an injury in the past year? Yes   Timed Up and Go score (seconds) 20.38   Is patient a fall risk? Yes   Pain   Pain comments R hip pain.  0/10 at rest but can  be as high as 10/10.   Cognitive Status Examination   Level of Consciousness alert   Observation   Observation Prosthesis on L LE due to BKA. Avoids eye contact most of the session looking away from therapist.    Posture   Posture Normal;Forward head position;Protracted shoulders;Kyphosis   Strength   Strength Comments R hip flexion 4/5 and L hip flexion 5/5, R knee extension 5/5 and L knee extension 4/5,  R hip abduction less than 3/5 and L hip abduction 3+/5, R knee flexion 4/5 and L knee flxion less than 3/5 due to impaired ROM. R dorsiflexion 3+/5.  L not tested due to prosthesis.    Bed Mobility   Bed Mobility Comments Independent   Transfer Skills   Transfer Comments relies heavily on B UE support for sit to/from stand   Gait   Gait Comments Arrived with cane. Without AD, ambulates with wide LINETTE, increased path deviation, uneven step length, slow speed.  Imbalance noted especially with fatigue.    Gait Special Tests   Gait Special Tests DYNAMIC GAIT INDEX;SIX MINUTE WALK TEST   Gait Special Tests Dynamic Gait Index   Score out of 24 6   Comments indicates at high risk of falling.    Gait Special Tests Six Minute Walk Test   Feet 600 Feet   Comments attempted to completed without an AD but last half using wall or window sill for balance > 50% of the time.  Therapist also providing CGA since becoming so unsteady and had to catch due to LOB at least 2x.    Balance Special Tests   Balance Special Tests Modified CTSIB Conditions   Balance Special Tests Modified CTSIB Conditions   Condition 1, seconds 13 Seconds   Modified CTSIB Comments only tested condition 1 but feel he probably would have fallen quickly on other conditions.    Sensory Examination   Sensory Perception Comments neuropathy R foot and B hands   Planned Therapy  Interventions   Planned Therapy Interventions balance training;gait training;ROM;strengthening;stretching;transfer training   Clinical Impression   Criteria for Skilled Therapeutic Interventions Met yes, treatment indicated   PT Diagnosis Difficulty walking   Influenced by the following impairments weakness, impaired standing balance, impaired sensation,   Functional limitations due to impairments limited community ambulation, decreased overall activity tolerance, high risk of falling.    Clinical Presentation Evolving/Changing   Clinical Presentation Rationale variable hip pain since nearing being ready for TOBI, DGI, 6MWT, TUG   Clinical Decision Making (Complexity) Moderate complexity   Therapy Frequency 2 times/Week   Predicted Duration of Therapy Intervention (days/wks) 60 days   Risk & Benefits of therapy have been explained Yes   Patient, Family & other staff in agreement with plan of care Yes   GOALS   PT Eval Goals 1;2;3;4   Goal 1   Goal Identifier 6MWT   Goal Description Client will be able to ambulate 750 ft using a cane or no AD to indicate improved activity tolerance with community walking   Target Date 06/22/18   Goal 2   Goal Identifier DGI   Goal Description Client will improve score to at least a 14/24 on DGI to help decrease risk of falls.    Target Date 06/22/18   Goal 3   Goal Identifier indoor ambulation   Goal Description Client will be able to ambulate independently and safely indoor distances of 150 ft without an AD    Target Date 06/22/18   Goal 4   Goal Identifier community ambulation   Goal Description Client will be able to ambulate SBA out in the community with cane.    Target Date 06/22/18   Total Evaluation Time   Total Evaluation Time (Minutes) 40

## 2018-04-24 NOTE — PROGRESS NOTES
IRF-PATTI CLARIFICATION NOTE FOR DISCHARGE  Lowest score for each FIM item supported by available charting  Discharge FIM scores taken from charting on 4/19/18.    Bathing: FIM 5. SBA per email clarification from Anuja Kwok.  Upper Body Dressing: FIM 7. Independent. Verbal clarification from SHIMON Rocha indicates patient dressed independently.  Lower Body Dressing: FIM 7. Independent. Verbal clarification from SHIMON Rocha indicates patient dressed independently.  Toileting: FIM 6. Modified independent with use of the grab bar.  Bladder:  FIM 5. Continent of bladder in the urinal at night, staff emptied it.  Bladder Number of Accidents: 2 urine accidents soiling linen.  Bowel: FIM 7. RN note indicates patient was continent of bowel on the toilet.  Bowel Number of Accidents: 1 bowel accident soiling linen.  Tub/shower transfer:  FIM 5. SBA per email clarification from Anuja Kwok.  Locomotion distance: 150-220  Locomotion: FIM 4. Notes indicate patient ambulated in the hallway with contact guard assist for occasional loss of balance.  Stairs: FIM 5. Patient performed 12 stairs with stand by assist.  Social Interaction: FIM 5. Patient required encouragement to participate in therapeutic activities.  Problem solving: FIM 4. Patient demonstrated decreased insight into deficits requiring assist with problem solving less than 25% of the time for safety.  Memory: FIM 6. Patient had only mild difficulty remembering directions for a complex therapeutic task. Was able to recall things such as swallow strategies. Overall mild difficulty recalling.    Pressure Ulcer Clarification:  [facility] nurse indicated there was no pressure ulcer identified on patient's amputated leg.

## 2018-04-24 NOTE — PROGRESS NOTES
[facility]        OUTPATIENT PHYSICAL THERAPY FUNCTIONAL EVALUATION  PLAN OF TREATMENT FOR OUTPATIENT REHABILITATION  (COMPLETE FOR INITIAL CLAIMS ONLY)  Patient's Last Name, First Name, M.I.  YOB: 1945  Antonio Ramirez     Provider's Name   [facility]   Medical Record No.  [id number]     Start of Care Date:  04/24/18   Onset Date:  04/07/18   Type:     _X__PT   ____OT  ____SLP Medical Diagnosis:   Subdural Hemorrhage     PT Diagnosis:  Difficulty walking Visits from SOC:  1                              __________________________________________________________________________________  Plan of Treatment/Functional Goals:  balance training, gait training, ROM, strengthening, stretching, transfer training           GOALS  6MWT  Client will be able to ambulate 750 ft using a cane or no AD to indicate improved activity tolerance with community walking  06/22/18    DGI  Client will improve score to at least a 14/24 on DGI to help decrease risk of falls.   06/22/18    indoor ambulation  Client will be able to ambulate independently and safely indoor distances of 150 ft without an AD   06/22/18    community ambulation  Client will be able to ambulate SBA out in the community with cane.   06/22/18          Therapy Frequency:  2 times/Week   Predicted Duration of Therapy Intervention:  60 days    Silvia Heath, PT                                    I CERTIFY THE NEED FOR THESE SERVICES FURNISHED UNDER        THIS PLAN OF TREATMENT AND WHILE UNDER MY CARE     (Physician co-signature of this document indicates review and certification of the therapy plan).                Certification Date From:    4/24/18  Certification Date To:   6/22/18    Referring Provider:  Dr. Coy Us    Initial Assessment  See Epic Evaluation- Start of Care Date: 04/24/18

## 2018-04-27 ENCOUNTER — OFFICE VISIT (OUTPATIENT)
Dept: FAMILY MEDICINE | Facility: CLINIC | Age: 73
End: 2018-04-27
Payer: MEDICARE

## 2018-04-27 VITALS
HEART RATE: 77 BPM | HEIGHT: 73 IN | DIASTOLIC BLOOD PRESSURE: 58 MMHG | WEIGHT: 210 LBS | OXYGEN SATURATION: 96 % | SYSTOLIC BLOOD PRESSURE: 123 MMHG | BODY MASS INDEX: 27.83 KG/M2 | TEMPERATURE: 97.8 F

## 2018-04-27 DIAGNOSIS — I10 BENIGN ESSENTIAL HYPERTENSION: ICD-10-CM

## 2018-04-27 DIAGNOSIS — D68.61 ANTIPHOSPHOLIPID ANTIBODY SYNDROME (H): ICD-10-CM

## 2018-04-27 DIAGNOSIS — S06.5XAA SUBDURAL HEMATOMA (H): Primary | ICD-10-CM

## 2018-04-27 PROCEDURE — 99214 OFFICE O/P EST MOD 30 MIN: CPT | Performed by: INTERNAL MEDICINE

## 2018-04-27 RX ORDER — CARVEDILOL 3.12 MG/1
3.12 TABLET ORAL 2 TIMES DAILY WITH MEALS
Qty: 180 TABLET | Refills: 1 | COMMUNITY
Start: 2018-04-27 | End: 2018-12-31

## 2018-04-27 NOTE — PROGRESS NOTES
SUBJECTIVE:   Antonio Ramirez is a 73 year old male who presents to clinic today for the following health issues:          Hospital Follow-up Visit:    Hospital/Nursing Home/IP Rehab Facility: [facility]  Date of Admission: 4-7-2018  Date of Discharge: 4-  Reason(s) for Admission: Right Subdural hematoma 2/2 mechanical fall             Problems taking medications regularly:  None       Medication changes since discharge: None       Problems adhering to non-medication therapy:  None    Summary of hospitalization:  [facility]  Diagnostic Tests/Treatments reviewed.  Follow up needed: neurological surgery   Other Healthcare Providers Involved in Patient s Care:         PT  Update since discharge: improved.     Post Discharge Medication Reconciliation: discharge medications reconciled and changed, per note/orders (see AVS).  Plan of care communicated with patient     Coding guidelines for this visit:  Type of Medical   Decision Making Face-to-Face Visit       within 7 Days of discharge Face-to-Face Visit        within 14 days of discharge   Moderate Complexity [id number]   High Complexity [id number]              Hospitalized with lethargy and falls 3 days after he slipped in his bathroom and struck the back of his head  Found to have SDH with midline shift that was evacuated surgically  He is participating in PT and has no severe residual neurological deficit  His coumadin was stopped  He has yet to have a follow up appointment with CT with neurosurgery, but it is scheduled for soon  He wants to have his hip replaced ASAP   He has an IVC filter in place  He is scheduled to follow up with his hematologist soon as well  He has no pain today  He had many questions about his surgery   He denies new focal weakness, numbness, dysarthria or vision changes     Problem list and histories reviewed & adjusted, as  indicated.  Additional history: as documented    Patient Active Problem List   Diagnosis     Right knee DJD     Alcohol abuse     Alcoholic cirrhosis of liver (H)     Chronic kidney disease, stage III (moderate)     Physical deconditioning     CAD, MI in 2007 -- S/P CABG x 3 in 2007     Prostate cancer -- S/P Radiative Seed implants in 2000 (no problems since)     Antiphospholipid Jina Syn, Hypercoag (DVT, PE) -- has IVC filt and Warfarin     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 (H)     Thoracic aortic aneurysm without rupture (H)     Chronic congestive heart failure, unspecified congestive heart failure type (H)     Abscess of Left BKA -- S/P I&D 12/1/17, MSSA     Paroxysmal a-fib (H)     Cellulitis and abscess of leg     Thrombocytopenia -- suspect related to alcohol use     Abscess     Infection of left below knee amputation (H)     Lymphedema     Anemia due to blood loss, acute     Yeast infection of the skin     Pressure ulcer, stage 2     Hyperkalemia     Confusion     Fall     S/P craniotomy     Subdural hematoma (H)     Past Surgical History:   Procedure Laterality Date     AMPUTATE LEG BELOW KNEE Left 04/2014    related to gangrene, started as foot ulcer related to neuropathy     AMPUTATE LEG BELOW KNEE Left 12/4/2017    Procedure: AMPUTATE LEG BELOW KNEE;  Exploration of Left Leg Below Knee Amputation Stump with Ligation of Bleeders.;  Surgeon: Leandro Arreola MD;  Location:  OR     APPENDECTOMY       APPLY WOUND VAC Left 12/6/2017    Procedure: APPLY WOUND VAC;;  Surgeon: Saima Howard MD;  Location:  SD     ARTHROPLASTY KNEE Right 9/19/2014    Procedure: ARTHROPLASTY KNEE;  Surgeon: Saima Howard MD;  Location:  OR     CARDIAC SURGERY      CABG     CHOLECYSTECTOMY       COLONOSCOPY       CRANIOTOMY Right 4/7/2018    Procedure: CRANIOTOMY;  Right Craniotomy For Subdural Hematoma;  Surgeon: Jono Issa MD;  Location:  OR     CYSTOSCOPY, DILATE URETHRA,  COMBINED N/A 10/12/2016    Procedure: COMBINED CYSTOSCOPY, DILATE URETHRA;  Surgeon: Dimitry Bello MD;  Location:  OR     ENDOSCOPIC STRIPPING VEIN(S)       esophagogastroduodenoscopy       EYE SURGERY      cataracts     GENITOURINARY SURGERY      Prostate Seen Implants     HERNIA REPAIR      Umbilical     INCISION AND DRAINAGE LOWER EXTREMITY, COMBINED Left 2017    Procedure: COMBINED INCISION AND DRAINAGE LOWER EXTREMITY;  INCISION AND DRAINAGE OF LEFT BELOW KNEE AMPUTATION ABSCESS, WOUND VAC PLACEMENT;  Surgeon: Saima Howard MD;  Location:  OR     IR IVC FILTER PLACEMENT       IRRIGATION AND DEBRIDEMENT LOWER EXTREMITY, COMBINED Left 2017    Procedure: COMBINED IRRIGATION AND DEBRIDEMENT LOWER EXTREMITY;  IRRIGATION AND DEBRIDEMENT OF LEFT BELOW KNEE AMPUTATION SITE WITH WOUND VAC REPLACEMENT;  Surgeon: Saima Howard MD;  Location:  SD     IRRIGATION AND DEBRIDEMENT LOWER EXTREMITY, COMBINED Left 2017    Procedure: COMBINED IRRIGATION AND DEBRIDEMENT LOWER EXTREMITY;  IRRIGATION AND DEBRIDEMENT OF LEFT BELOW THE KNEE AMPUTATION AND SHORTENING OF THE TIBIA WITH WOUND CLOSURE;  Surgeon: Saima Howard MD;  Location:  OR     ORTHOPEDIC SURGERY      (R) knee scope     ORTHOPEDIC SURGERY      total hip      ORTHOPEDIC SURGERY      elbow surgery       Social History   Substance Use Topics     Smoking status: Never Smoker     Smokeless tobacco: Never Used     Alcohol use Yes      Comment: quit 10/2015; now 3-5 Vodkas/night     Family History   Problem Relation Age of Onset     Lung Cancer Mother      Heart Failure Father       age 87         Current Outpatient Prescriptions   Medication Sig Dispense Refill     carvedilol (COREG) 3.125 MG tablet Take 1 tablet (3.125 mg) by mouth 2 times daily (with meals) 180 tablet 1     fluticasone (FLONASE) 50 MCG/ACT spray Spray 2 sprays into both nostrils daily       GABAPENTIN PO Take 600 mg by mouth 2 times daily       levETIRAcetam  "(KEPPRA) 500 MG tablet Take 1 tablet (500 mg) by mouth 2 times daily 60 tablet 0     torsemide (DEMADEX) 20 MG tablet Take 20 mg by mouth daily       Allergies   Allergen Reactions     Hmg-Coa-R Inhibitors Other (See Comments)     Rhabdomyolysis occurred within a couple days     Ciprofloxacin Itching     Severe itching \"like ants were crawling\"       Reviewed and updated as needed this visit by clinical staff       Reviewed and updated as needed this visit by Provider         ROS:  Constitutional, HEENT, cardiovascular, pulmonary, gi and gu systems are negative, except as otherwise noted.    OBJECTIVE:     /58 (BP Location: Right arm, Patient Position: Chair, Cuff Size: Adult Regular)  Pulse 77  Temp 97.8  F (36.6  C) (Tympanic)  Ht 6' 1\" (1.854 m)  Wt 210 lb (95.3 kg)  SpO2 96%  BMI 27.71 kg/m2  Body mass index is 27.71 kg/(m^2).  General: This is a well-appearing man in no acute distress.  He speaks in full sentences and does not appear toxic.  He appears well-nourished.  HEENT: There is a very large scar overlying the right scalp from his recent craniotomy, the wound seems to be healing well without evidence of infection.  Cardiovascular: The heart has a regular rate and rhythm.  Pulmonary: The lungs are clear to auscultation bilaterally, breathing is not labored.  Extremities: He is status post amputation, there is no change in chronic peripheral edema in the remaining leg.  Neurological: Alert and oriented to person place and time, cranial nerves II to XII appear grossly intact, pupils are equal and round and reactive to light, extraocular movements are intact, no pronator drift, strength is symmetric in both upper extremities, the patient walks with a cane.  Mental status: Improve mood and affect, well-groomed, normal speech.    Diagnostic Test Results:  Results for orders placed or performed during the hospital encounter of 04/12/18   Basic metabolic panel   Result Value Ref Range    Sodium 141 " 133 - 144 mmol/L    Potassium 3.7 3.4 - 5.3 mmol/L    Chloride 106 94 - 109 mmol/L    Carbon Dioxide 27 20 - 32 mmol/L    Anion Gap 8 3 - 14 mmol/L    Glucose 88 70 - 99 mg/dL    Urea Nitrogen 17 7 - 30 mg/dL    Creatinine 0.95 0.66 - 1.25 mg/dL    GFR Estimate 77 >60 mL/min/1.7m2    GFR Estimate If Black >90 >60 mL/min/1.7m2    Calcium 9.0 8.5 - 10.1 mg/dL       ASSESSMENT/PLAN:             1. Subdural hematoma (H)  Luckily he does not seem to have significant residual neurological deficits from this event.  He will need to follow-up with neurosurgery with a CT scan to decide on if and when it is reasonable to restart anticoagulants.  For now, he will hold his Coumadin.  He will continue with physical therapy for rehab.    2. Antiphospholipid Jina Syn, Hypercoag (DVT, PE) -- has IVC filt and Warfarin  Once his head is reimaged and it is determined that his hematoma is not actively bleeding, we will need to discuss the risks and benefits of restarting anticoagulants in this patient with a hypercoagulable state and a history of multiple thromboembolic events.  Of note, he does have an IVC filter in place.  He will meet with his hematologist shortly as well.    3. Benign essential hypertension  There was some adjustments made to his carvedilol dose during his hospitalization.  However, his current carvedilol doses his previous dose of 3.125 mg twice daily which seems to be controlling his blood pressure well today in clinic and his pulses normal.    With respect to his hip osteoarthritis and need for hip replacement.  Once it is determined that his subdural hematoma has stabilized, we can reevaluate his readiness to undergo elective hip replacement surgery.  I recommended that he schedule appointment with me for about 1 month's time to discuss this, hopefully by then, he will have met with neurosurgery as well as hematology.          Main Hardy MD  [facility]

## 2018-04-27 NOTE — NURSING NOTE
"Chief Complaint   Patient presents with     Hospital F/U        Initial /58 (BP Location: Right arm, Patient Position: Chair, Cuff Size: Adult Regular)  Pulse 77  Temp 97.8  F (36.6  C) (Tympanic)  Ht 6' 1\" (1.854 m)  Wt 210 lb (95.3 kg)  SpO2 96%  BMI 27.71 kg/m2 Estimated body mass index is 27.71 kg/(m^2) as calculated from the following:    Height as of this encounter: 6' 1\" (1.854 m).    Weight as of this encounter: 210 lb (95.3 kg)..    BP completed using cuff size: regular  MEDICATIONS REVIEWED  SOCIAL AND FAMILY HX REVIEWED  Sydnie Maxwell CMA  "

## 2018-04-27 NOTE — MR AVS SNAPSHOT
After Visit Summary   4/27/2018    Antonio Ramirez    MRN: [id number]           Patient Information     Date Of Birth          1945        Visit Information        Provider Department      4/27/2018 4:30 PM Main Hardy MD [facility]        Today's Diagnoses     Subdural hematoma (H)    -  1    Antiphospholipid Jina Syn, Hypercoag (DVT, PE) -- has IVC filt and Warfarin        Benign essential hypertension           Follow-ups after your visit        Your next 10 appointments already scheduled     Apr 30, 2018 10:45 AM CDT   Neuro Treatment with Silvia Goel, PT   [facility] ([facility])    [address]  [address]  [facility] [address]   [phone]            May 01, 2018  3:30 PM CDT   Neuro Treatment with Silvia Goel, PT   [facility] ([facility])    [address] [place] [address]  [address]  [facility] [address]   [phone]            May 02, 2018 11:15 AM CDT   CT HEAD W/O CONTRAST with SHCT1   [facility] CT ([facility])    [address] [facility] [address]   [phone]           Please bring any scans or X-rays taken at other hospitals, if similar tests were done. Also bring a list of your medicines, including vitamins, minerals and over-the-counter drugs. It is safest to leave personal items at home.  Be sure to tell your doctor:   If you have any allergies.   If there s any chance you are pregnant.   If you are breastfeeding.  You do not need to do anything special to prepare for this exam.  Please wear loose clothing, such as a sweat suit or jogging clothes. Avoid snaps, zippers and other metal. We may ask you to undress and put on a hospital gown.            May 02, 2018 12:10 PM CDT   Return Visit with Gracy Hall NP   [facility] ([facility])    [address] [facility] [address]  [facility] [address]    [phone]            May 08, 2018 11:15 AM CDT   Neuro Treatment with Silvia Goel, PT   [facility] ([facility])    [address] [place] [address]  [address]  [place] [address]   [phone]            May 15, 2018 10:15 AM CDT   Neuro Treatment with Silvia Goel, PT   [facility] ([facility])    [address] [place] [address]  [address]  [place] [address]   [phone]            May 16, 2018  9:15 AM CDT   Neuro Eval with Radha Lloyd, OT   [facility] ([facility])    [address] [place] [address]  [address]  [place] [address]   [phone]            May 17, 2018 12:00 PM CDT   Neuro Treatment with Silvia Goel, PT   [facility] ([facility])    [address] [place] [address]  [address]  [place] [address]   [phone]            May 22, 2018 10:30 AM CDT   Neuro Treatment with Silvia Goel, PT   [facility] ([facility])    [address] [place] [address]  [address]  [place] [address]   [phone]            May 24, 2018 10:15 AM CDT   Neuro Treatment with Silvia Goel, PT   [facility] ([facility])    [address] [place] [address]  [address]  [place] [address]   [phone]              Who to contact     If you have questions or need follow up information about today's clinic visit or your schedule please contact [facility] directly at [phone].  Normal or non-critical lab and imaging results will be communicated to you by MyChart, letter or phone within 4 business days after the clinic has received the results. If you do not hear from us within 7 days, please contact the clinic through MyChart or phone. If you have a critical or abnormal lab result, we will notify you by phone as soon as possible.  Submit refill requests through [employer] or call your pharmacy and they will forward the refill request to us. Please allow 3 business  "days for your refill to be completed.          Additional Information About Your Visit        MyChart Information     [employer] gives you secure access to your electronic health record. If you see a primary care provider, you can also send messages to your care team and make appointments. If you have questions, please call your primary care clinic.  If you do not have a primary care provider, please call [phone] and they will assist you.        Care EveryWhere ID     This is your Care EveryWhere ID. This could be used by other organizations to access your [place] medical records  [id number]        Your Vitals Were     Pulse Temperature Height Pulse Oximetry BMI (Body Mass Index)       77 97.8  F (36.6  C) (Tympanic) 6' 1\" (1.854 m) 96% 27.71 kg/m2        Blood Pressure from Last 3 Encounters:   04/27/18 123/58   04/20/18 124/57   04/12/18 128/73    Weight from Last 3 Encounters:   04/27/18 210 lb (95.3 kg)   04/19/18 199 lb 8 oz (90.5 kg)   04/12/18 183 lb 12.8 oz (83.4 kg)              Today, you had the following     No orders found for display       Primary Care Provider Office Phone # Fax #    Main Hardy [phone]       [facility] [address] [place] [address] [address] [address]        Equal Access to Services     [facility] : Hadii aad ku hadasho Soomaali, waaxda luqadaha, qaybta kaalmada adeegyada, igor miller . So [facility] [phone].    ATENCIÓN: Si habla español, tiene a shrestha disposición servicios gratuitos de asistencia lingüística. Anabelle al [phone].    We comply with applicable federal civil rights laws and [place] laws. We do not discriminate on the basis of race, color, national origin, age, disability, sex, sexual orientation, or gender identity.            Thank you!     Thank you for choosing [facility]  for your care. Our goal is always to provide you with excellent care. Hearing back from our patients is one way we " can continue to improve our services. Please take a few minutes to complete the written survey that you may receive in the mail after your visit with us. Thank you!             Your Updated Medication List - Protect others around you: Learn how to safely use, store and throw away your medicines at www.disposemymeds.org.          This list is accurate as of 4/27/18 10:33 PM.  Always use your most recent med list.                   Brand Name Dispense Instructions for use Diagnosis    carvedilol 3.125 MG tablet    COREG    180 tablet    Take 1 tablet (3.125 mg) by mouth 2 times daily (with meals)        fluticasone 50 MCG/ACT spray    FLONASE     Spray 2 sprays into both nostrils daily        GABAPENTIN PO      Take 600 mg by mouth 2 times daily        levETIRAcetam 500 MG tablet    KEPPRA    60 tablet    Take 1 tablet (500 mg) by mouth 2 times daily    Subdural hemorrhage (H)       torsemide 20 MG tablet    DEMADEX     Take 20 mg by mouth daily

## 2018-04-30 ENCOUNTER — HOSPITAL ENCOUNTER (OUTPATIENT)
Dept: PHYSICAL THERAPY | Facility: CLINIC | Age: 73
Setting detail: THERAPIES SERIES
End: 2018-04-30
Attending: PHYSICAL MEDICINE & REHABILITATION
Payer: MEDICARE

## 2018-04-30 PROCEDURE — 97116 GAIT TRAINING THERAPY: CPT | Mod: GP | Performed by: PHYSICAL THERAPIST

## 2018-04-30 PROCEDURE — 97110 THERAPEUTIC EXERCISES: CPT | Mod: GP | Performed by: PHYSICAL THERAPIST

## 2018-04-30 PROCEDURE — 97112 NEUROMUSCULAR REEDUCATION: CPT | Mod: GP | Performed by: PHYSICAL THERAPIST

## 2018-04-30 PROCEDURE — 40000719 ZZHC STATISTIC PT DEPARTMENT NEURO VISIT: Performed by: PHYSICAL THERAPIST

## 2018-05-01 ENCOUNTER — HOSPITAL ENCOUNTER (OUTPATIENT)
Dept: PHYSICAL THERAPY | Facility: CLINIC | Age: 73
Setting detail: THERAPIES SERIES
End: 2018-05-01
Attending: PHYSICAL MEDICINE & REHABILITATION
Payer: MEDICARE

## 2018-05-01 PROCEDURE — 97116 GAIT TRAINING THERAPY: CPT | Mod: GP | Performed by: PHYSICAL THERAPIST

## 2018-05-01 PROCEDURE — 97112 NEUROMUSCULAR REEDUCATION: CPT | Mod: GP | Performed by: PHYSICAL THERAPIST

## 2018-05-01 PROCEDURE — 40000719 ZZHC STATISTIC PT DEPARTMENT NEURO VISIT: Performed by: PHYSICAL THERAPIST

## 2018-05-02 ENCOUNTER — OFFICE VISIT (OUTPATIENT)
Dept: NEUROSURGERY | Facility: CLINIC | Age: 73
End: 2018-05-02
Attending: NURSE PRACTITIONER
Payer: MEDICARE

## 2018-05-02 ENCOUNTER — HOSPITAL ENCOUNTER (OUTPATIENT)
Dept: CT IMAGING | Facility: CLINIC | Age: 73
Discharge: HOME | End: 2018-05-02
Attending: NURSE PRACTITIONER | Admitting: NURSE PRACTITIONER
Payer: MEDICARE

## 2018-05-02 VITALS
HEART RATE: 67 BPM | TEMPERATURE: 97.6 F | DIASTOLIC BLOOD PRESSURE: 65 MMHG | SYSTOLIC BLOOD PRESSURE: 131 MMHG | OXYGEN SATURATION: 98 %

## 2018-05-02 DIAGNOSIS — S06.5XAA SDH (SUBDURAL HEMATOMA) (H): ICD-10-CM

## 2018-05-02 DIAGNOSIS — Z98.890 S/P CRANIOTOMY: Primary | ICD-10-CM

## 2018-05-02 DIAGNOSIS — I62.00 SUBDURAL HEMORRHAGE (H): ICD-10-CM

## 2018-05-02 PROCEDURE — 99024 POSTOP FOLLOW-UP VISIT: CPT | Performed by: NURSE PRACTITIONER

## 2018-05-02 PROCEDURE — G0463 HOSPITAL OUTPT CLINIC VISIT: HCPCS | Performed by: NURSE PRACTITIONER

## 2018-05-02 PROCEDURE — 70450 CT HEAD/BRAIN W/O DYE: CPT

## 2018-05-02 ASSESSMENT — PAIN SCALES - GENERAL: PAINLEVEL: NO PAIN (0)

## 2018-05-02 NOTE — PATIENT INSTRUCTIONS
-recommend repeat CT of head and f/u in 3 weeks    - Call the clinic at [phone] for increasing redness, swelling or pus draining from the incision, increased pain or any other questions and concerns.  - Go to ER with any seizure activity, mental status change (increasing confusion), difficulty with speech or increasing or acute weakness

## 2018-05-02 NOTE — PROGRESS NOTES
"[facility]  Neurosurgery followup:    HPI: Antonio Ramirez is a 73 year old male who is s/p right craniotomy for evacuation of SDH with Dr. Jono Issa at [facility] on 4/7/18.  He had presented to the ED at the time with confusion and head CT showed right frontotemporal parietal subacute SDH with midline shift.  He is here in clinic today for follow up.  He denies pain or headache.  Staples were removed at rehab facility without incident.  He denies confusion or mental status changes.  He is at home now with his wife and states he is eager to have right hip surgery, \"That's my biggest pain and I waited too long to have it done.\"  He uses a cane while ambulating, states this is not new.  He denies dizziness, SOB, chest pain.  He states since being home he has had one fall, but attributes this to his sandals.  He denies increased weakness to lower extremities, \"Aside from my hip.\"  He denies changes to bowel or bladder.  Exam:  Constitutional:  Alert, well nourished, NAD.  HEENT: Normocephalic, atraumatic.   Pulm:  Without shortness of breath or audible adventitious respiratory sounds.  CV:  No pitting edema of BLE.  Brisk capillary refill, CMS intact.    Neurological:  Awake  Alert  Oriented x 3  Speech clear  Cranial nerves II - XII intact  PERRL  EOMI  Face symmetric  Tongue midline  Motor exam  5/5 strength in all four extremities. (Full strength to right hip add/abd with discomfort.)   Sensation intact to light touch  Finger to Nose smooth  Pronator drift negative  Gait: Able to stand from a seated position. Uses a cane, slight antalgic gait favoring right hip.  Non-myelopathic gait.  Incision: Clean, dry, intact.  Imaging: Head CT 5/2/18:  IMPRESSION:   1. Interval reaccumulation of mixed density subdural fluid along the right cerebral convexity measuring up to 1.7 cm in thickness resulting in 1 cm of leftward midline shift of the septum pellucidum.  2. New thin subdural fluid along the left " "occipital convexity  measuring 0.5 cm in thickness.  3. Diffuse cerebral volume loss and cerebral white matter changes consistent with chronic small vessel ischemic disease    A/P: Antonio Ramirez is a 73 year old male who is s/p right craniotomy for evacuation of SDH with Dr. Jono Issa at [facility] on 4/7/18.  He had presented to the ED at the time with confusion and head CT showed right frontotemporal parietal subacute SDH with midline shift.  He is here in clinic today for follow up.  He denies pain or headache.  Staples were removed at rehab facility without incident.  He denies confusion or mental status changes.  He is at home now with his wife and states he is eager to have right hip surgery, \"That's my biggest pain and I waited too long to have it done.\"  He uses a cane while ambulating, states this is not new.  He denies dizziness, SOB, chest pain.  He states since being home he has had one fall, but attributes this to his sandals.  He denies increased weakness to lower extremities, \"Aside from my hip.\"  He denies changes to bowel or bladder.  We reviewed head CT images today and discussed findings of increased subdural accumulation on the right and new fluid along the left.  Dr. Blake was contacted via phone and he also reviewed imaging.  Discussed recommendation per Dr Blake with the patient; patient is alert without confusion, cognitive changes or neurofocal deficits.  Therefore, we recommend repeating head CT in the next few weeks.  Patient verbalized understanding that should he experience any changes to mental status or any weakness or difficulty with speech or any seizures, he will contact EMS or go to ED.  He did inquire about scheduling hip surgery and we discussed our recommendation to wait until his f/u CT in 3 weeks with appointment for f/u with Dr. Issa to review results.      Patient Instructions   -recommend repeat CT of head and f/u in 3 weeks    - Call the clinic at [phone] for " increasing redness, swelling or pus draining from the incision, increased pain or any other questions and concerns.  - Go to ER with any seizure activity, mental status change (increasing confusion), difficulty with speech or increasing or acute weakness         Gracy Hall [facility]  [facility]  [address] [address]    Tel [phone]  Pager [phone]

## 2018-05-02 NOTE — NURSING NOTE
"Antonio Ramirez is a 73 year old male who presents for:  Chief Complaint   Patient presents with     Neurologic Problem     s/p craniotomy 4-7-18        Initial Vitals:  There were no vitals taken for this visit. Estimated body mass index is 27.71 kg/(m^2) as calculated from the following:    Height as of 4/27/18: 6' 1\" (1.854 m).    Weight as of 4/27/18: 210 lb (95.3 kg).. There is no height or weight on file to calculate BSA. BP completed using cuff size: regular  Data Unavailable    Do you feel safe in your environment?  Yes  Do you need any refills today? No    Nursing Comments: s/p craniotomy 4-7-18.  Patient rates his pain today as 0        Maryam Sexton CMA, AAS      Discharge plan:   See providers dictation   Maryam Sexton CMA, AAS       "

## 2018-05-02 NOTE — LETTER
"    5/2/2018         RE: Antonio Ramirez  [address]   [facility] [address]        Dear Colleague,    Thank you for referring your patient, Antonio Ramirez, to the [facility] NEUROSURGERY CLINIC. Please see a copy of my visit note below.    [facility]  Neurosurgery followup:    HPI: Antonio Ramirez is a 73 year old male who is s/p right craniotomy for evacuation of SDH with Dr. Jono Issa at [facility] on 4/7/18.  He had presented to the ED at the time with confusion and head CT showed right frontotemporal parietal subacute SDH with midline shift.  He is here in clinic today for follow up.  He denies pain or headache.  Staples were removed at rehab facility without incident.  He denies confusion or mental status changes.  He is at home now with his wife and states he is eager to have right hip surgery, \"That's my biggest pain and I waited too long to have it done.\"  He uses a cane while ambulating, states this is not new.  He denies dizziness, SOB, chest pain.  He states since being home he has had one fall, but attributes this to his sandals.  He denies increased weakness to lower extremities, \"Aside from my hip.\"  He denies changes to bowel or bladder.  Exam:  Constitutional:  Alert, well nourished, NAD.  HEENT: Normocephalic, atraumatic.   Pulm:  Without shortness of breath or audible adventitious respiratory sounds.  CV:  No pitting edema of BLE.  Brisk capillary refill, CMS intact.    Neurological:  Awake  Alert  Oriented x 3  Speech clear  Cranial nerves II - XII intact  PERRL  EOMI  Face symmetric  Tongue midline  Motor exam  5/5 strength in all four extremities. (Full strength to right hip add/abd with discomfort.)   Sensation intact to light touch  Finger to Nose smooth  Pronator drift negative  Gait: Able to stand from a seated position. Uses a cane, slight antalgic gait favoring right hip.  Non-myelopathic gait.  Incision: Clean, dry, intact.  Imaging: Head CT 5/2/18:  IMPRESSION: " "  1. Interval reaccumulation of mixed density subdural fluid along the right cerebral convexity measuring up to 1.7 cm in thickness resulting in 1 cm of leftward midline shift of the septum pellucidum.  2. New thin subdural fluid along the left occipital convexity  measuring 0.5 cm in thickness.  3. Diffuse cerebral volume loss and cerebral white matter changes consistent with chronic small vessel ischemic disease    A/P: Antonio Ramirez is a 73 year old male who is s/p right craniotomy for evacuation of SDH with Dr. Jono Issa at [facility] on 4/7/18.  He had presented to the ED at the time with confusion and head CT showed right frontotemporal parietal subacute SDH with midline shift.  He is here in clinic today for follow up.  He denies pain or headache.  Staples were removed at rehab facility without incident.  He denies confusion or mental status changes.  He is at home now with his wife and states he is eager to have right hip surgery, \"That's my biggest pain and I waited too long to have it done.\"  He uses a cane while ambulating, states this is not new.  He denies dizziness, SOB, chest pain.  He states since being home he has had one fall, but attributes this to his sandals.  He denies increased weakness to lower extremities, \"Aside from my hip.\"  He denies changes to bowel or bladder.  We reviewed head CT images today and discussed findings of increased subdural accumulation on the right and new fluid along the left.  Dr. Blake was contacted via phone and he also reviewed imaging.  Discussed recommendation per Dr Blake with the patient; patient is alert without confusion, cognitive changes or neurofocal deficits.  Therefore, we recommend repeating head CT in the next few weeks.  Patient verbalized understanding that should he experience any changes to mental status or any weakness or difficulty with speech or any seizures, he will contact EMS or go to ED.  He did inquire about scheduling hip surgery and we " discussed our recommendation to wait until his f/u CT in 3 weeks with appointment for f/u with Dr. Issa to review results.      Patient Instructions   -recommend repeat CT of head and f/u in 3 weeks    - Call the clinic at [phone] for increasing redness, swelling or pus draining from the incision, increased pain or any other questions and concerns.  - Go to ER with any seizure activity, mental status change (increasing confusion), difficulty with speech or increasing or acute weakness         Gracy Hall CNP  [facility]  [place] [address]    Tel [phone]  Pager [phone]        Again, thank you for allowing me to participate in the care of your patient.        Sincerely,        Gracy Hall, NP

## 2018-05-02 NOTE — MR AVS SNAPSHOT
After Visit Summary   5/2/2018    Antonio Ramirez    MRN: [id number]           Patient Information     Date Of Birth          1945        Visit Information        Provider Department      5/2/2018 12:10 PM Gracy Hall, LEA [facility]        Today's Diagnoses     S/P craniotomy    -  1    SDH (subdural hematoma) (H)          Care Instructions    -recommend repeat CT of head and f/u in 3 weeks    - Call the clinic at [phone] for increasing redness, swelling or pus draining from the incision, increased pain or any other questions and concerns.  - Go to ER with any seizure activity, mental status change (increasing confusion), difficulty with speech or increasing or acute weakness           Follow-ups after your visit        Your next 10 appointments already scheduled     May 08, 2018 11:15 AM CDT   Neuro Treatment with Silvia Goel, PT   [facility] ([facility])    [address]  [address]  [place] [address]   [phone]            May 15, 2018 10:15 AM CDT   Neuro Treatment with Silvia Goel, PT   [facility] ([facility])    [address]  [address]  [place] [address]   [phone]            May 16, 2018  9:15 AM CDT   Neuro Eval with Radha Lloyd, OT   [facility] ([facility])    [address]  [address]  [place] [address]   [phone]            May 17, 2018 12:00 PM CDT   Neuro Treatment with Silvia Goel, PT   [facility] ([facility])    [address]  [address]  [place] [address]   [phone]            May 22, 2018 10:30 AM CDT   Neuro Treatment with Silvia Goel, PT   [facility] ([facility])    [address]  [address]  [place] [address]   [phone]            May 24, 2018 10:15 AM CDT   Neuro Treatment with Silvia Goel, PT    [facility] ([facility])    [address] [place] [address]  [address]  [place] [id number] [phone]              Future tests that were ordered for you today     Open Future Orders        Priority Expected Expires Ordered    CT Head w/o Contrast Routine  5/2/2019 5/2/2018            Who to contact     If you have questions or need follow up information about today's clinic visit or your schedule please contact [facility] NEUROS[facility]GERY CLINIC directly at [phone].  Normal or non-critical lab and imaging results will be communicated to you by [employer], letter or phone within 4 business days after the clinic has received the results. If you do not hear from us within 7 days, please contact the clinic through waygum or phone. If you have a critical or abnormal lab result, we will notify you by phone as soon as possible.  Submit refill requests through waygum or call your pharmacy and they will forward the refill request to us. Please allow 3 business days for your refill to be completed.          Additional Information About Your Visit        [employer] Information     waygum gives you secure access to your electronic health record. If you see a primary care provider, you can also send messages to your care team and make appointments. If you have questions, please call your primary care clinic.  If you do not have a primary care provider, please call [phone] and they will assist you.        Care EveryWhere ID     This is your Care EveryWhere ID. This could be used by other organizations to access your [facility] medical records  [id number]        Your Vitals Were     Pulse Temperature Pulse Oximetry             67 97.6  F (36.4  C) (Oral) 98%          Blood Pressure from Last 3 Encounters:   05/02/18 131/65   04/27/18 123/58   04/20/18 124/57    Weight from Last 3 Encounters:   04/27/18 210 lb (95.3 kg)   04/19/18 199 lb 8 oz (90.5 kg)   04/12/18 183 lb 12.8 oz (83.4 kg)                Primary Care Provider Office Phone # Fax #    Main Hardy [phone]       [place] [address] [address] [address]        Equal Access to Services     ANTHONY DORANTES : Hadii peyton ku haoo Soshalomali, waaxda luqadaha, qaybta kaalmada adeegyada, waxay idiin jeffn quan ross paul mix. So [facility] [phone].    ATENCIÓN: Si habla español, tiene a shrestha disposición servicios gratuitos de asistencia lingüística. Llame al [phone].    We comply with applicable federal civil rights laws and [place] laws. We do not discriminate on the basis of race, color, national origin, age, disability, sex, sexual orientation, or gender identity.            Thank you!     Thank you for choosing [facility] NEUROSURGERY [facility]  for your care. Our goal is always to provide you with excellent care. Hearing back from our patients is one way we can continue to improve our services. Please take a few minutes to complete the written survey that you may receive in the mail after your visit with us. Thank you!             Your Updated Medication List - Protect others around you: Learn how to safely use, store and throw away your medicines at www.disposemymeds.org.          This list is accurate as of 5/2/18 12:41 PM.  Always use your most recent med list.                   Brand Name Dispense Instructions for use Diagnosis    carvedilol 3.125 MG tablet    COREG    180 tablet    Take 1 tablet (3.125 mg) by mouth 2 times daily (with meals)        fluticasone 50 MCG/ACT spray    FLONASE     Spray 2 sprays into both nostrils daily        GABAPENTIN PO      Take 600 mg by mouth 2 times daily        levETIRAcetam 500 MG tablet    KEPPRA    60 tablet    Take 1 tablet (500 mg) by mouth 2 times daily    Subdural hemorrhage (H)       torsemide 20 MG tablet    DEMADEX     Take 20 mg by mouth daily

## 2018-05-08 ENCOUNTER — HOSPITAL ENCOUNTER (OUTPATIENT)
Dept: PHYSICAL THERAPY | Facility: CLINIC | Age: 73
Setting detail: THERAPIES SERIES
End: 2018-05-08
Attending: PHYSICAL MEDICINE & REHABILITATION
Payer: MEDICARE

## 2018-05-08 PROCEDURE — 97112 NEUROMUSCULAR REEDUCATION: CPT | Mod: GP | Performed by: PHYSICAL THERAPIST

## 2018-05-08 PROCEDURE — 40000719 ZZHC STATISTIC PT DEPARTMENT NEURO VISIT: Performed by: PHYSICAL THERAPIST

## 2018-05-08 PROCEDURE — 97110 THERAPEUTIC EXERCISES: CPT | Mod: GP | Performed by: PHYSICAL THERAPIST

## 2018-05-08 PROCEDURE — 97116 GAIT TRAINING THERAPY: CPT | Mod: GP | Performed by: PHYSICAL THERAPIST

## 2018-05-14 ENCOUNTER — TRANSFERRED RECORDS (OUTPATIENT)
Dept: HEALTH INFORMATION MANAGEMENT | Facility: CLINIC | Age: 73
End: 2018-05-14

## 2018-05-15 ENCOUNTER — OFFICE VISIT (OUTPATIENT)
Dept: NEUROSURGERY | Facility: CLINIC | Age: 73
End: 2018-05-15
Attending: NEUROLOGICAL SURGERY
Payer: MEDICARE

## 2018-05-15 ENCOUNTER — HOSPITAL ENCOUNTER (OUTPATIENT)
Dept: PHYSICAL THERAPY | Facility: CLINIC | Age: 73
Setting detail: THERAPIES SERIES
End: 2018-05-15
Attending: PHYSICAL MEDICINE & REHABILITATION
Payer: MEDICARE

## 2018-05-15 ENCOUNTER — HOSPITAL ENCOUNTER (OUTPATIENT)
Dept: CT IMAGING | Facility: CLINIC | Age: 73
Discharge: HOME | End: 2018-05-15
Attending: NURSE PRACTITIONER | Admitting: NURSE PRACTITIONER
Payer: MEDICARE

## 2018-05-15 VITALS
HEART RATE: 69 BPM | SYSTOLIC BLOOD PRESSURE: 141 MMHG | OXYGEN SATURATION: 98 % | TEMPERATURE: 97.5 F | DIASTOLIC BLOOD PRESSURE: 80 MMHG

## 2018-05-15 DIAGNOSIS — Z98.890 S/P CRANIOTOMY: ICD-10-CM

## 2018-05-15 DIAGNOSIS — S06.5XAA SDH (SUBDURAL HEMATOMA) (H): ICD-10-CM

## 2018-05-15 DIAGNOSIS — Z98.890 S/P CRANIOTOMY: Primary | ICD-10-CM

## 2018-05-15 PROCEDURE — 97110 THERAPEUTIC EXERCISES: CPT | Mod: GP | Performed by: PHYSICAL THERAPIST

## 2018-05-15 PROCEDURE — 40000719 ZZHC STATISTIC PT DEPARTMENT NEURO VISIT: Performed by: PHYSICAL THERAPIST

## 2018-05-15 PROCEDURE — 97112 NEUROMUSCULAR REEDUCATION: CPT | Mod: GP | Performed by: PHYSICAL THERAPIST

## 2018-05-15 PROCEDURE — 99024 POSTOP FOLLOW-UP VISIT: CPT | Performed by: NEUROLOGICAL SURGERY

## 2018-05-15 PROCEDURE — 97116 GAIT TRAINING THERAPY: CPT | Mod: GP | Performed by: PHYSICAL THERAPIST

## 2018-05-15 PROCEDURE — G0463 HOSPITAL OUTPT CLINIC VISIT: HCPCS | Performed by: NEUROLOGICAL SURGERY

## 2018-05-15 PROCEDURE — 70450 CT HEAD/BRAIN W/O DYE: CPT

## 2018-05-15 ASSESSMENT — PAIN SCALES - GENERAL: PAINLEVEL: NO PAIN (0)

## 2018-05-15 NOTE — NURSING NOTE
"Antonio Ramirez is a 73 year old male who presents for:  Chief Complaint   Patient presents with     Neurologic Problem     s/p craniotomy 4-7-18        Initial Vitals:  There were no vitals taken for this visit. Estimated body mass index is 27.71 kg/(m^2) as calculated from the following:    Height as of 4/27/18: 6' 1\" (1.854 m).    Weight as of 4/27/18: 210 lb (95.3 kg).. There is no height or weight on file to calculate BSA. BP completed using cuff size: regular  Data Unavailable    Do you feel safe in your environment?  Yes  Do you need any refills today? No    Nursing Comments: s/p craniotomy 4-7-18.  Patient rates his pain today as 0        Maryam Sexton CMA, AAS          "

## 2018-05-15 NOTE — MR AVS SNAPSHOT
After Visit Summary   5/15/2018    Antonio Ramirez    MRN: [id number]           Patient Information     Date Of Birth          1945        Visit Information        Provider Department      5/15/2018 9:20 AM Jono Issa MD [facility]surgery [facility]        Today's Diagnoses     S/P craniotomy    -  1    SDH (subdural hematoma) (H)          Care Instructions    -Follow up in 3 months with CT head.   -Please call [phone] to schedule diana at [facility].  -Call [phone] to schedule your CT scan.               Follow-ups after your visit        Your next 10 appointments already scheduled     May 15, 2018 10:15 AM CDT   Neuro Treatment with Silvia Goel, PT   [facility] ([facility])    [address] [place] [address]   [phone]            May 16, 2018  9:15 AM CDT   Neuro Eval with Radha Lloyd OT   [facility] ([facility])    [address] [place] [address]  [facility] [address]   [phone]            May 17, 2018 12:00 PM CDT   Neuro Treatment with Silvia Goel, PT   [facility] ([facility])    [address] [place] [address]  [facility] [address]   [phone]            May 18, 2018  3:30 PM CDT   Office Visit with Main Hardy MD   [facility] ([facility])    [address] [facility] [address]   [phone]           Bring a current list of meds and any records pertaining to this visit. For Physicals, please bring immunization records and any forms needing to be filled out. Please arrive 10 minutes early to complete paperwork.            May 22, 2018 10:30 AM CDT   Neuro Treatment with Silvia Goel, PT   [facility] ([facility])    [address] [place] [address]  [facility] [address]   [phone]            May 24, 2018 10:15 AM CDT   Neuro  Treatment with Silvia Goel, PAUL   [facility] ([facility])    [address]  [address]  [place] [address] [phone]            Jun 19, 2018   Procedure with Saima Howard MD   [facility] (--)    [address]  [place] [id number] [phone]              Future tests that were ordered for you today     Open Future Orders        Priority Expected Expires Ordered    CT Head w/o Contrast Routine 8/15/2018 5/15/2019 5/15/2018            Who to contact     If you have questions or need follow up information about today's clinic visit or your schedule please contact [facility] NEUROSURGERY CLINIC directly at [phone].  Normal or non-critical lab and imaging results will be communicated to you by [employer], letter or phone within 4 business days after the clinic has received the results. If you do not hear from us within 7 days, please contact the clinic through [employer] or phone. If you have a critical or abnormal lab result, we will notify you by phone as soon as possible.  Submit refill requests through Inst[employer] or call your pharmacy and they will forward the refill request to us. Please allow 3 business days for your refill to be completed.          Additional Information About Your Visit        [employer] Information     [employer] gives you secure access to your electronic health record. If you see a primary care provider, you can also send messages to your care team and make appointments. If you have questions, please call your primary care clinic.  If you do not have a primary care provider, please call [phone] and they will assist you.        Care EveryWhere ID     This is your Care EveryWhere ID. This could be used by other organizations to access your [facility] medical records  [id number]        Your Vitals Were     Pulse Temperature Pulse Oximetry             69 97.5  F (36.4  C) (Oral) 98%          Blood  Pressure from Last 3 Encounters:   05/15/18 141/80   05/02/18 131/65   04/27/18 123/58    Weight from Last 3 Encounters:   04/27/18 210 lb (95.3 kg)   04/19/18 199 lb 8 oz (90.5 kg)   04/12/18 183 lb 12.8 oz (83.4 kg)               Primary Care Provider Office Phone # Fax #    Main MARROQUIN MD Antony [phone] [address]  [facility] [address]        Equal Access to Services     [facility]: Hadii aad ku hadasho Soomaali, waaxda luqadaha, qaybta kaalmada adeegyada, waxamanda miller . So [facility] [phone].    ATENCIÓN: Si habla español, tiene a shrestha disposición servicios gratuitos de asistencia lingüística. [facility] [phone].    We comply with applicable federal civil rights laws and [place] laws. We do not discriminate on the basis of race, color, national origin, age, disability, sex, sexual orientation, or gender identity.            Thank you!     Thank you for choosing [facility] NEUROSURGERY CLINIC  for your care. Our goal is always to provide you with excellent care. Hearing back from our patients is one way we can continue to improve our services. Please take a few minutes to complete the written survey that you may receive in the mail after your visit with us. Thank you!             Your Updated Medication List - Protect others around you: Learn how to safely use, store and throw away your medicines at www.disposemymeds.org.          This list is accurate as of 5/15/18  9:59 AM.  Always use your most recent med list.                   Brand Name Dispense Instructions for use Diagnosis    carvedilol 3.125 MG tablet    COREG    180 tablet    Take 1 tablet (3.125 mg) by mouth 2 times daily (with meals)        fluticasone 50 MCG/ACT spray    FLONASE     Spray 2 sprays into both nostrils daily        GABAPENTIN PO      Take 600 mg by mouth 2 times daily        levETIRAcetam 500 MG tablet    KEPPRA    60 tablet    Take 1 tablet (500 mg) by mouth 2  times daily    Subdural hemorrhage (H)       torsemide 20 MG tablet    DEMADEX     Take 20 mg by mouth daily

## 2018-05-15 NOTE — LETTER
5/15/2018         RE: Antonio Ramirez  [address]   [facility] [address]        Dear Colleague,    Thank you for referring your patient, Antonio Ramirez, to the [facility] NEUROSURGERY CLINIC. Please see a copy of my visit note below.    Neurosurgery Follow Up Clinic Visit      Antonio Ramirez returns for follow up visit regarding his previous craniotomy for evacuation of subdural hematoma    Currently the patient describes having significant improvement in his symptoms    Exam is stable  Incision clean dry and intact  Motor is stable    We reviewed the CT head and he has significant improvement in the shift when compared to the preop images  .    Plan:  We will have the patient follow-up in 3 months with a CT of his head with the nurse practitioner  The patient says he has antiphospholipid and his primary  physician would like to restart him on Xarelto.  I informed the patient that it is okay to restart Xarelto if his primary care physician or oncologist feel that it is absolutely necessary for him to continue taking it due to significant risk of stroke.            Again, thank you for allowing me to participate in the care of your patient.        Sincerely,        Jono Issa MD

## 2018-05-15 NOTE — PATIENT INSTRUCTIONS
-Follow up in 3 months with CT head.   -Please call [phone] to schedule diana at [place] [facility].  -Call [phone] to schedule your CT scan.

## 2018-05-16 ENCOUNTER — HOSPITAL ENCOUNTER (OUTPATIENT)
Dept: OCCUPATIONAL THERAPY | Facility: CLINIC | Age: 73
Setting detail: THERAPIES SERIES
End: 2018-05-16
Attending: PHYSICAL MEDICINE & REHABILITATION
Payer: MEDICARE

## 2018-05-16 PROCEDURE — 40000125 ZZHC STATISTIC OT OUTPT VISIT: Performed by: OCCUPATIONAL THERAPIST

## 2018-05-16 PROCEDURE — G8987 SELF CARE CURRENT STATUS: HCPCS | Mod: GO,CI | Performed by: OCCUPATIONAL THERAPIST

## 2018-05-16 PROCEDURE — G8989 SELF CARE D/C STATUS: HCPCS | Mod: GO,CI | Performed by: OCCUPATIONAL THERAPIST

## 2018-05-16 PROCEDURE — 97165 OT EVAL LOW COMPLEX 30 MIN: CPT | Mod: GO | Performed by: OCCUPATIONAL THERAPIST

## 2018-05-16 PROCEDURE — G8988 SELF CARE GOAL STATUS: HCPCS | Mod: GO,CI | Performed by: OCCUPATIONAL THERAPIST

## 2018-05-16 ASSESSMENT — VISUAL ACUITY: OU: INTACT

## 2018-05-16 NOTE — PROGRESS NOTES
Neurosurgery Follow Up Clinic Visit      Antonio Ramirez returns for follow up visit regarding his previous craniotomy for evacuation of subdural hematoma    Currently the patient describes having significant improvement in his symptoms    Exam is stable  Incision clean dry and intact  Motor is stable    We reviewed the CT head and he has significant improvement in the shift when compared to the preop images  .    Plan:  We will have the patient follow-up in 3 months with a CT of his head with the nurse practitioner  The patient says he has antiphospholipid and his primary  physician would like to restart him on Xarelto.  I informed the patient that it is okay to restart Xarelto if his primary care physician or oncologist feel that it is absolutely necessary for him to continue taking it due to significant risk of stroke.

## 2018-05-18 ENCOUNTER — OFFICE VISIT (OUTPATIENT)
Dept: FAMILY MEDICINE | Facility: CLINIC | Age: 73
End: 2018-05-18
Payer: MEDICARE

## 2018-05-18 VITALS
DIASTOLIC BLOOD PRESSURE: 68 MMHG | WEIGHT: 210.9 LBS | OXYGEN SATURATION: 96 % | SYSTOLIC BLOOD PRESSURE: 119 MMHG | HEART RATE: 74 BPM | BODY MASS INDEX: 27.95 KG/M2 | TEMPERATURE: 97.5 F | HEIGHT: 73 IN

## 2018-05-18 DIAGNOSIS — C83.398 DIFFUSE LARGE B-CELL LYMPHOMA OF EXTRANODAL SITE: ICD-10-CM

## 2018-05-18 DIAGNOSIS — M16.11 PRIMARY OSTEOARTHRITIS OF RIGHT HIP: ICD-10-CM

## 2018-05-18 DIAGNOSIS — S06.5XAA SUBDURAL HEMATOMA (H): ICD-10-CM

## 2018-05-18 DIAGNOSIS — D64.9 ANEMIA, UNSPECIFIED TYPE: ICD-10-CM

## 2018-05-18 DIAGNOSIS — K70.30 ALCOHOLIC CIRRHOSIS OF LIVER WITHOUT ASCITES (H): ICD-10-CM

## 2018-05-18 DIAGNOSIS — Z01.818 PREOP GENERAL PHYSICAL EXAM: Primary | ICD-10-CM

## 2018-05-18 DIAGNOSIS — R73.01 IMPAIRED FASTING GLUCOSE: ICD-10-CM

## 2018-05-18 DIAGNOSIS — I10 BENIGN ESSENTIAL HYPERTENSION: ICD-10-CM

## 2018-05-18 DIAGNOSIS — D68.61 ANTIPHOSPHOLIPID ANTIBODY SYNDROME (H): ICD-10-CM

## 2018-05-18 DIAGNOSIS — I50.9 CHRONIC CONGESTIVE HEART FAILURE, UNSPECIFIED CONGESTIVE HEART FAILURE TYPE: ICD-10-CM

## 2018-05-18 PROBLEM — F10.21 ALCOHOL DEPENDENCE IN REMISSION (H): Status: ACTIVE | Noted: 2018-05-18

## 2018-05-18 LAB
ERYTHROCYTE [DISTWIDTH] IN BLOOD BY AUTOMATED COUNT: 19.8 % (ref 10–15)
FOLATE SERPL-MCNC: 8.5 NG/ML
HBA1C MFR BLD: 4.6 % (ref 0–5.6)
HCT VFR BLD AUTO: 34.1 % (ref 40–53)
HGB BLD-MCNC: 10.6 G/DL (ref 13.3–17.7)
MCH RBC QN AUTO: 26.8 PG (ref 26.5–33)
MCHC RBC AUTO-ENTMCNC: 31.1 G/DL (ref 31.5–36.5)
MCV RBC AUTO: 86 FL (ref 78–100)
PLATELET # BLD AUTO: 168 10E9/L (ref 150–450)
RBC # BLD AUTO: 3.95 10E12/L (ref 4.4–5.9)
WBC # BLD AUTO: 4.4 10E9/L (ref 4–11)

## 2018-05-18 PROCEDURE — 82607 VITAMIN B-12: CPT | Performed by: INTERNAL MEDICINE

## 2018-05-18 PROCEDURE — 83540 ASSAY OF IRON: CPT | Performed by: INTERNAL MEDICINE

## 2018-05-18 PROCEDURE — 82746 ASSAY OF FOLIC ACID SERUM: CPT | Performed by: INTERNAL MEDICINE

## 2018-05-18 PROCEDURE — 36415 COLL VENOUS BLD VENIPUNCTURE: CPT | Performed by: INTERNAL MEDICINE

## 2018-05-18 PROCEDURE — 83550 IRON BINDING TEST: CPT | Performed by: INTERNAL MEDICINE

## 2018-05-18 PROCEDURE — 83010 ASSAY OF HAPTOGLOBIN QUANT: CPT | Performed by: INTERNAL MEDICINE

## 2018-05-18 PROCEDURE — 85027 COMPLETE CBC AUTOMATED: CPT | Performed by: INTERNAL MEDICINE

## 2018-05-18 PROCEDURE — 99215 OFFICE O/P EST HI 40 MIN: CPT | Performed by: INTERNAL MEDICINE

## 2018-05-18 PROCEDURE — 83036 HEMOGLOBIN GLYCOSYLATED A1C: CPT | Performed by: INTERNAL MEDICINE

## 2018-05-18 PROCEDURE — 80048 BASIC METABOLIC PNL TOTAL CA: CPT | Performed by: INTERNAL MEDICINE

## 2018-05-18 PROCEDURE — 93000 ELECTROCARDIOGRAM COMPLETE: CPT | Performed by: INTERNAL MEDICINE

## 2018-05-18 NOTE — Clinical Note
Can we call [place] [facility] on Monday and see if Francisco Javier EDWARDS can call me back at his convenience (not urgent) regarding this patient

## 2018-05-18 NOTE — PROGRESS NOTES
05/16/18 0900   Quick Adds   Quick Adds Certification   Type of Visit Initial Outpatient Occupational Therapy Evaluation   General Information   Start Of Care Date 05/16/18   Referring Physician Dr. Us   Orders Evaluate and treat as indicated   Other Orders PT   Orders Date 04/19/18   Medical Diagnosis Subdural hemorrhage   Onset of Illness/Injury or Date of Surgery 04/07/18   Precautions/Limitations (10# lifting restriction)   Surgical/Medical History Reviewed Yes   Additional Occupational Profile Info/Pertinent History of Current Problem Antonio Ramirez is a 73 year old male with a PMHx of with a complex PMH antiphospholipid antibody syndrome s/p IVC filter placement and previously on coumadin, including alcohol abuse, left BKA, balance problems, and cardiomyopathy who presented to the ED via EMS after a fall on 4/7/18. Patient had refused to be taken to the hospital via EMS after initial evaluation. Patient began to get increasingly lethargic and wife called EMS again to transport patient to the ER. He was essentially unresponsive at this time. Head CT showed a subdural hematoma on the right with a leftward shift. Patient was taken to the OR for evacuation and underwent craniotomy.  He transferred to ARU 4/12 to 4/20. Today, patient reports that he is 100% back to baseline with ADL/IADL activity.  He has significant weakness and incoordination in his hands but this is not new (due to the PmHx).  He has used many pieces of AE/DME to assist with self cares/dressing/feeding/writing.     Comments/Observations Patient feels he is back to 100%.     Role/Living Environment   Current Community Support Family/friend caregiver;Housekeeping service   Patient role/Employment history Retired  ()   Community/Avocational Activities Does Pilates 1x/week.     Current Living Environment Apartment/condo   Number of Stairs to Enter Home 0   Number of Stairs Within Home 0   Primary Bathroom Location/Comments Walk in shower  "with grab bars and shower chair.   Prior Level - Transfers Independent   Prior Level - Ambulation Independent   Prior Level - ADLS Independent   Prior Responsibilities - IADL Driving;Medication management;Finances   Prior Level Comments Wife does most of the household activities/cooking.  Patient and wife share household tasks.   Current Assistive Devices - Mobility Standard cane   Patient/family Goals Statement None-denies a need for OT.   Pain   Patient currently in pain No   Fall Risk Screen   Fall screen completed by PT   Cognitive Status Examination   Orientation Orientation to person, place and time   Level of Consciousness Alert   Follows Commands and Answers Questions 100% of the time;Able to follow multistep instructions   Personal Safety and Judgment Intact   Memory Intact   Memory Comments MOCA: 27 (normal is 26+)   Attention No deficits were identified   Organization/Problem Solving No deficits were identified   Cognitive Comment Retired .    Visual Perception   Visual Perception Wears glasses   Visual Acuity Intact   Visual Field Intact   Visual Attention Intact pursuits and saccades   Visual Perception Comments Denies any changes with vision.   Sensation   Sensation Comments Neuropathy in both hands and feet.    Range of Motion (ROM)   ROM Quick Adds No deficits identified   Hand Strength   Hand Dominance Left   Left Hand  (pounds) 34 pounds   Right Hand  (pounds) 55 pounds   Hand Strength Comments B hand strength falls 1 SD below the mean for his age group.  Patient takes a pliers with him to the [employer] station to take his card in/out of the .  Patient does not want to or care to work on hand strength.  \"I'm lazy and don't care.\"  Has jar openers.     Coordination   Upper Extremity Coordination Left UE impaired;Right UE impaired   Left Hand, Nine Hole Peg Test (seconds) 1'11   Right Hand, Nine Hole Peg Test (seconds) 1'28\"   Coordination Comments \"My hands are worthless when it " "comes to  poker chips or pills\"  Uses thumb and medial portion of 1st digit to  pegs.  Patient has degeneration disease and patient reports strength/coordination will not improve.   Balance   Balance Comments See PT notes   Functional Mobility   Functional Mobility Comments L AIYANA, uses cane to walk.   Transfer Skill   Level of [place]: Transfers independent   Assistive Device straight cane   Tub/Shower Transfer   Tub/Shower Transfer Comments Modified independence with shower chair.   Bathing   Level of [place] - Bathing independent   Bathing Comments Uses soap on a rope.    Upper Body Dressing   Level of [place]: Dress Upper Body minimum assist (75% patients effort)   Physical Assist/Nonphysical Assist: Dress Upper Body 1 person assist   Upper Body Dressing Comments Has a button hook but \"it's easier to have my wife button my shirts.\"   Lower Body Dressing   Level of [place]: Dress Lower Body independent   Toileting   Level of [place]: Toilet independent   Grooming   Level of [place]: Grooming independent   Eating/Self-Feeding   Level of [place]: Eating independent   Instrumental Activities of Daily Living Assessment   IADL Assessment/Observations Patient has resumed all IADLs including medication set up, finances, and driving.   Clinical Impression   Criteria for Skilled Therapeutic Interventions Met Current level of function same as previous level of function;Other (see comments)  (Patient pleasantly declines further OT treatment.)   Therapy Frequency Eval only   Clinical Impression Comments Patient was seen for an OT evaluation only.     Education Assessment   Barriers To Learning No Barriers   Preferred Learning Style Listening;Reading;Demonstration;Pictures/video   1x Eval Only-Medicare G-Code   G-code Self Care   Self Care   Self Care: Current Status , Goal ,  Discharge -[place] Only- Modifier the same for all G-codes CI: 1-19% impairment   Self " Care: Current & Discharge Modifier Rationale-Eval Only patient reports, is at his baseline   Therapy Certification   Certification date from 05/16/18   Certification date to 05/16/18   Total Evaluation Time   Total Evaluation Time 55

## 2018-05-18 NOTE — PROGRESS NOTES
[facility]  [address] [facility] [address]  [phone]  Dept: [phone]    PRE-OP EVALUATION:  Today's date: 2018    Antonio Ramirez (: 1945) presents for pre-operative evaluation assessment as requested by Saima Crockett MD  He requires evaluation and anesthesia risk assessment prior to undergoing surgery/procedure for treatment of right hip osteoarthritis.    Proposed Surgery/ Procedure:   ARTHROPLASTY HIP Right       Date of Surgery/ Procedure: 2018  Time of Surgery/ Procedure: 9:30am  Hospital/Surgical Facility:  OR    Primary Physician: Main Hardy  Type of Anesthesia Anticipated: General    Patient has a Health Care Directive or Living Will:  YES     1. yes - Do you have a history of heart attack, stroke, stent, bypass or surgery on an artery in the head, neck, heart or legs?  2. NO - Do you ever have any pain or discomfort in your chest?  3. yes - Do you have a history of  Heart Failure?  4. NO - Are you troubled by shortness of breath when: walking on the level, up a slight hill or at night?  5. NO - Do you currently have a cold, bronchitis or other respiratory infection?  6. NO - Do you have a cough, shortness of breath or wheezing?  7. NO - Do you sometimes get pains in the calves of your legs when you walk?  8. yes - Do you or anyone in your family have previous history of blood clots?  9. NO - Do you or does anyone in your family have a serious bleeding problem such as prolonged bleeding following surgeries or cuts?  10. yes - Have you ever had problems with anemia or been told to take iron pills?  11. NO - Have you had any abnormal blood loss such as black, tarry or bloody stools, or abnormal vaginal bleeding?  12. yes - Have you ever had a blood transfusion?  13. NO - Have you or any of your relatives ever had problems with anesthesia?  14. NO - Do you have sleep apnea, excessive snoring or daytime drowsiness?  15. NO - Do you have any  prosthetic heart valves?  16. yes - Do you have prosthetic joints?  17. NO - Is there any chance that you may be pregnant?      HPI:     HPI related to upcoming procedure: This is a 73-year-old man with a very complex medical history significant for recent subdural hematoma after head injury.  He also has cirrhosis from alcohol and admits to currently drinking 2-3 alcoholic beverages per night.  He has a history of coronary artery disease status post CABG in 2007.  He also has antiphospholipid antibody syndrome and has a history of venous thromboembolism.  He is status post a left lower extremity amputation.  He was recently treated for abscess and infection associated with his amputation site requiring revision surgery.  He has a thoracic aortic aneurysm which was last imaged about 1 year ago by echocardiogram.  He has atrial fibrillation and congestive heart failure and admits that he does not always take his torsemide as directed.He desires to have a right total hip arthroplasty.  He describes daily, constant, severe, quality of life limiting pain in his right hip joint that has been present for several months and was refractory to a fluoroscopic guided cortisone injection.  He does state that he is participating in physical therapy following his subdural hematoma as well as Pilates classes 2 days per week since his hospital discharge for his subdural hematoma.  He does not get chest pain or shortness of breath when doing so.  He recently saw his neurosurgeon who is pleased with his follow-up CT scan.  He has been off anticoagulants since his subdural hematoma, but his neurosurgeon suggested that he may return to taking anticoagulants if absolutely necessary at this juncture.  His hematologist recently christopher some labs pertaining to his antiphospholipid antibody syndrome and Mr. Ramirez is waiting to hear back from his hematologist regarding whether to restart his anticoagulants.  There was some discussion about  changing his Coumadin to low-dose Xarelto.      MEDICAL HISTORY:     Patient Active Problem List    Diagnosis Date Noted     Alcohol dependence in remission (H) 05/18/2018     Priority: Medium     Benign essential hypertension 04/27/2018     Priority: Medium     S/P craniotomy 04/07/2018     Priority: Medium     Subdural hematoma (H) 04/07/2018     Priority: Medium     Fall 01/29/2018     Priority: Medium     Hyperkalemia 12/17/2017     Priority: Medium     Confusion 12/17/2017     Priority: Medium     Yeast infection of the skin 12/14/2017     Priority: Medium     Pressure ulcer, stage 2 12/14/2017     Priority: Medium     Lymphedema 12/12/2017     Priority: Medium     Anemia due to blood loss, acute 12/12/2017     Priority: Medium     Infection of left below knee amputation (H) 12/04/2017     Priority: Medium     Abscess 12/01/2017     Priority: Medium     Abscess of Left BKA -- S/P I&D 12/1/17, MSSA 11/29/2017     Priority: Medium     Paroxysmal a-fib (H) 11/29/2017     Priority: Medium     Cellulitis and abscess of leg 11/29/2017     Priority: Medium     Thrombocytopenia -- suspect related to alcohol use 11/29/2017     Priority: Medium     Chronic congestive heart failure, unspecified congestive heart failure type (H) 06/30/2017     Priority: Medium     CAD, MI in 2007 -- S/P CABG x 3 in 2007 05/24/2017     Priority: Medium     Prostate cancer -- S/P Radiative Seed implants in 2000 (no problems since) 05/24/2017     Priority: Medium     Antiphospholipid Jina Syn, Hypercoag (DVT, PE) -- has IVC filt and Warfarin 05/24/2017     Priority: Medium     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 (H) 05/24/2017     Priority: Medium     Thoracic aortic aneurysm without rupture (H) 05/24/2017     Priority: Medium     Alcohol abuse 09/24/2014     Priority: Medium     Problem list name updated by automated process. Provider to review       Alcoholic cirrhosis of liver (H) 09/24/2014     Priority: Medium     Chronic  kidney disease, stage III (moderate) 09/24/2014     Priority: Medium     Physical deconditioning 09/24/2014     Priority: Medium     Right knee DJD 09/19/2014     Priority: Medium      Past Medical History:   Diagnosis Date     Alcoholism (H)      Amputated left leg (H)      Anemia      Anticardiolipin antibody syndrome (H)      Antiplatelet or antithrombotic long-term use      Aortic stenosis      Arthritis      Balance problem      Cardiomyopathy (H)      Coagulation disorder (H)     cardiolipinantibody syndrome     Coronary artery disease      Gastro-oesophageal reflux disease      Heart attack 2007     Hiatal hernia      Hypercholesterolemia      Hypertension      Left foot drop      Liver disease     alcoholic liver disease, alcoholic cirrohsosis, portal hypertension     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)     ?When diagnosed, patient not aware of this     Moderate mitral regurgitation      Monoclonal gammopathy      Neuropathy      Noninfectious ileitis     diverticulitis of colon     PE (pulmonary embolism) 2006     Prostate cancer (H) 2000    Treated with radiation (seed implants in 2000) -- no problems since     Renal disease     kidney stones     Syncope      Thoracic aortic aneurysm, without rupture      Thrombocytopenia (H)      Thrombosis of leg      Urethral stricture      Past Surgical History:   Procedure Laterality Date     AMPUTATE LEG BELOW KNEE Left 04/2014    related to gangrene, started as foot ulcer related to neuropathy     AMPUTATE LEG BELOW KNEE Left 12/4/2017    Procedure: AMPUTATE LEG BELOW KNEE;  Exploration of Left Leg Below Knee Amputation Stump with Ligation of Bleeders.;  Surgeon: Leandro Arreola MD;  Location:  OR     APPENDECTOMY       APPLY WOUND VAC Left 12/6/2017    Procedure: APPLY WOUND VAC;;  Surgeon: Saima Howard MD;  Location:  [facility]     ARTHROPLASTY KNEE Right 9/19/2014    Procedure: ARTHROPLASTY KNEE;  Surgeon: Saima Howard MD;  Location:   OR     CARDIAC SURGERY      CABG     CHOLECYSTECTOMY       COLONOSCOPY       CRANIOTOMY Right 4/7/2018    Procedure: CRANIOTOMY;  Right Craniotomy For Subdural Hematoma;  Surgeon: Jono Issa MD;  Location:  OR     CYSTOSCOPY, DILATE URETHRA, COMBINED N/A 10/12/2016    Procedure: COMBINED CYSTOSCOPY, DILATE URETHRA;  Surgeon: Dimitry Bello MD;  Location:  OR     ENDOSCOPIC STRIPPING VEIN(S)       esophagogastroduodenoscopy       EYE SURGERY      cataracts     GENITOURINARY SURGERY      Prostate Seen Implants     HERNIA REPAIR      Umbilical     INCISION AND DRAINAGE LOWER EXTREMITY, COMBINED Left 12/1/2017    Procedure: COMBINED INCISION AND DRAINAGE LOWER EXTREMITY;  INCISION AND DRAINAGE OF LEFT BELOW KNEE AMPUTATION ABSCESS, WOUND VAC PLACEMENT;  Surgeon: Saima Howard MD;  Location:  OR     IR IVC FILTER PLACEMENT       IRRIGATION AND DEBRIDEMENT LOWER EXTREMITY, COMBINED Left 12/6/2017    Procedure: COMBINED IRRIGATION AND DEBRIDEMENT LOWER EXTREMITY;  IRRIGATION AND DEBRIDEMENT OF LEFT BELOW KNEE AMPUTATION SITE WITH WOUND VAC REPLACEMENT;  Surgeon: Saima Howard MD;  Location:  [facility]     IRRIGATION AND DEBRIDEMENT LOWER EXTREMITY, COMBINED Left 12/8/2017    Procedure: COMBINED IRRIGATION AND DEBRIDEMENT LOWER EXTREMITY;  IRRIGATION AND DEBRIDEMENT OF LEFT BELOW THE KNEE AMPUTATION AND SHORTENING OF THE TIBIA WITH WOUND CLOSURE;  Surgeon: Saima Howard MD;  Location:  OR     ORTHOPEDIC SURGERY      (R) knee scope     ORTHOPEDIC SURGERY      total hip      ORTHOPEDIC SURGERY      elbow surgery     Current Outpatient Prescriptions   Medication Sig Dispense Refill     ASPIRIN NOT PRESCRIBED (INTENTIONAL) Please choose reason not prescribed, below 0 each 0     carvedilol (COREG) 3.125 MG tablet Take 1 tablet (3.125 mg) by mouth 2 times daily (with meals) 180 tablet 1     fluticasone (FLONASE) 50 MCG/ACT spray Spray 2 sprays into both nostrils daily       GABAPENTIN PO Take  "600 mg by mouth 2 times daily       levETIRAcetam (KEPPRA) 500 MG tablet Take 1 tablet (500 mg) by mouth 2 times daily 60 tablet 0     torsemide (DEMADEX) 20 MG tablet Take 20 mg by mouth daily       OTC products: None, except as noted above    Allergies   Allergen Reactions     Hmg-Coa-R Inhibitors Other (See Comments)     Rhabdomyolysis occurred within a couple days     Ciprofloxacin Itching     Severe itching \"like ants were crawling\"      Latex Allergy: NO    Social History   Substance Use Topics     Smoking status: Never Smoker     Smokeless tobacco: Never Used     Alcohol use Yes      Comment: quit 10/2015; now 5-6 Vodkas/night     History   Drug Use No       REVIEW OF SYSTEMS:   Constitutional, neuro, ENT, endocrine, pulmonary, cardiac, gastrointestinal, genitourinary, musculoskeletal, integument and psychiatric systems are negative, except as otherwise noted.    EXAM:   /68 (BP Location: Right arm, Cuff Size: Adult Large)  Pulse 74  Temp 97.5  F (36.4  C) (Oral)  Ht 6' 1\" (1.854 m)  Wt 210 lb 14.4 oz (95.7 kg)  SpO2 96%  BMI 27.82 kg/m2  General: This is a well-appearing, comfortable appearing man.  HEENT: There is a well-healing right scalp incision site.  The bilateral tympanic membranes are normal, nasal exam is normal, the posterior pharynx appears normal.  The mucous membranes are moist.  The neck is supple without adenopathy.  Cardiovascular: The heart has an irregularly irregular rhythm with a normal rate.  There are no carotid bruits.  Pulmonary: Lungs are clear to auscultation bilaterally, breathing is not labored, no dullness to percussion.  Abdomen: Soft, not distended, nontender, bowel sounds present, no masses, no obvious organomegaly.  Extremities: There is a left leg amputation.  The right lower extremity is without edema and well perfused.  Neurological: Alert and oriented to person place and time, cranial nerves II to XII appear grossly intact, there is symmetric strength, he " walks with a cane.  Mental status: Normal mood and affect, mildly impaired insight and judgment, well-groomed, normal speech.    DIAGNOSTICS:   EKG: Atrial fibrillation with a rate of 71    Recent Labs   Lab Test  04/13/18   0545  04/12/18   0730   04/11/18   0807  04/10/18   0815   HGB   --   10.3*   --   10.5*  9.7*   PLT   --   137*   --   156  131*   INR   --    --    --   1.39*  1.58*   NA  141  139   --   140  143   POTASSIUM  3.7  3.9   < >  2.9*  3.4   CR  0.95  0.90   --   0.88  0.93    < > = values in this interval not displayed.        IMPRESSION:   Reason for surgery/procedure: Intractable right hip pain   Diagnosis/reason for consult: Preoperative evaluation     The proposed surgical procedure is considered INTERMEDIATE risk.    REVISED CARDIAC RISK INDEX  The patient has the following serious cardiovascular risks for perioperative complications such as (MI, PE, VFib and 3  AV Block):  Coronary Artery Disease (MI, positive stress test, angina, Qs on EKG)  Congestive Heart Failure (pulmonary edema, PND, s3 negra, CXR with pulmonary congestion, basilar rales)  INTERPRETATION: 2 risks: Class III (moderate risk - 6.6% complication rate)    The patient has the following additional risks for perioperative complications:  Alcohol abuse with risk of withdrawal  Significant bleeding history      ICD-10-CM    1. Preop general physical exam Z01.818 Basic metabolic panel     EKG 12-lead complete w/read - Clinics   2. Primary osteoarthritis of right hip M16.11    3. Antiphospholipid Jina Syn, Hypercoag (DVT, PE) -- has IVC filt and Warfarin D68.61 ASPIRIN NOT PRESCRIBED (INTENTIONAL)   4. Subdural hematoma (H) I62.00    5. Alcoholic cirrhosis of liver without ascites (H) K70.30    6. 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 Chronic congestive heart failure, unspecified congestive heart failure type (H) I50.9    8. Benign essential hypertension I10    9. Anemia, unspecified type D64.9 CBC  with platelets     Iron and iron binding capacity     Vitamin B12     Folate     Haptoglobin   10. Impaired fasting glucose R73.01 Hemoglobin A1c       RECOMMENDATIONS:     I had a very mehran conversation with Mr. Ramirez today regarding elective surgery.  Frankly, he is at very high risk for complications from the surgery given his current medical state.  He is recently status post a subdural hematoma.  He has yet to resume his anticoagulants.  He has serious alcoholic related liver disease and continues to drink alcohol.  I told him to stop drinking alcohol prior to consideration for surgery.  He has anemia which needs to be addressed prior to proceeding with surgery.  He has a ascending aortic aneurysm which would certainly need to be imaged prior to elective surgery.  He has until June 19 before his surgery day.  I will evaluate his anemia and optimize it during that time period.  I will also contact his hematologist to devise a plan for his anticoagulants in light of his recent subdural and in anticipation of elective surgery.  He believes he may have an appointment to see his cardiologist prior to his surgery date.  That would be ideal so that his thoracic aorta can be imaged and we can make sure that his aneurysm is stable prior to his hip surgery.  If he cannot get into see his cardiologist before his anticipated surgery date, we can arrange for an echocardiogram in the [facility] system.  His heart failure status seems to be well compensated.  His coronary artery disease seems to be stable at this time.    I did spend more than 40 minutes counseling this patient and coordinating care in anticipation of his surgery.    Signed Electronically by: Main Hardy MD    Copy of this evaluation report is provided to requesting physician.    [facility] Preop Guidelines    Revised Cardiac Risk Index

## 2018-05-18 NOTE — MR AVS SNAPSHOT
After Visit Summary   5/18/2018    Antonio Ramirez    MRN: [id number]           Patient Information     Date Of Birth          1945        Visit Information        Provider Department      5/18/2018 3:30 PM Main Hardy MD [facility]        Today's Diagnoses     Preop general physical exam    -  1    Primary osteoarthritis of right hip        Antiphospholipid Jina Syn, Hypercoag (DVT, PE) -- has IVC filt and Warfarin        Subdural hematoma (H)        Alcoholic cirrhosis of liver without ascites (H)        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 (H)        Chronic congestive heart failure, unspecified congestive heart failure type (H)        Benign essential hypertension        Anemia, unspecified type        Impaired fasting glucose          Care Instructions      Before Your Surgery      Call your surgeon if there is any change in your health. This includes signs of a cold or flu (such as a sore throat, runny nose, cough, rash or fever).    Do not smoke, drink alcohol or take over the counter medicine (unless your surgeon or primary care doctor tells you to) for the 24 hours before and after surgery.    If you take prescribed drugs: Follow your doctor s orders about which medicines to take and which to stop until after surgery.    Eating and drinking prior to surgery: follow the instructions from your surgeon    Take a shower or bath the night before surgery. Use the soap your surgeon gave you to gently clean your skin. If you do not have soap from your surgeon, use your regular soap. Do not shave or scrub the surgery site.  Wear clean pajamas and have clean sheets on your bed.           Follow-ups after your visit        Your next 10 appointments already scheduled     May 22, 2018 10:30 AM CDT   Neuro Treatment with Silvia Goel, PT   [facility] ([facility])    [address]  [address]  [facility] [address]   [phone]          "   May 29, 2018  1:30 PM CDT   Neuro Treatment with Silvia Goel, PAUL   [facility] ([facility])    [address]  [address]  [place] [address] [phone]            Jun 19, 2018   Procedure with Saima Howard MD   [facility] (--)    [address]  [place] [address] [phone]              Who to contact     If you have questions or need follow up information about today's clinic visit or your schedule please contact [facility] directly at [phone].  Normal or non-critical lab and imaging results will be communicated to you by "[employer]hart, letter or phone within 4 business days after the clinic has received the results. If you do not hear from us within 7 days, please contact the clinic through "[employer]hart or phone. If you have a critical or abnormal lab result, we will notify you by phone as soon as possible.  Submit refill requests through [employer] or call your pharmacy and they will forward the refill request to us. Please allow 3 business days for your refill to be completed.          Additional Information About Your Visit        [employer] Information     [employer] gives you secure access to your electronic health record. If you see a primary care provider, you can also send messages to your care team and make appointments. If you have questions, please call your primary care clinic.  If you do not have a primary care provider, please call [phone] and they will assist you.        Care EveryWhere ID     This is your Care EveryWhere ID. This could be used by other organizations to access your [facility] medical records  [id number]        Your Vitals Were     Pulse Temperature Height Pulse Oximetry BMI (Body Mass Index)       74 97.5  F (36.4  C) (Oral) 6' 1\" (1.854 m) 96% 27.82 kg/m2        Blood Pressure from Last 3 Encounters:   05/18/18 119/68   05/15/18 141/80   05/02/18 131/65    Weight from Last 3 " Encounters:   05/18/18 210 lb 14.4 oz (95.7 kg)   04/27/18 210 lb (95.3 kg)   04/19/18 199 lb 8 oz (90.5 kg)              We Performed the Following     Basic metabolic panel     CBC with platelets     EKG 12-lead complete w/read - Clinics     Folate     Haptoglobin     Hemoglobin A1c     Iron and iron binding capacity     Vitamin B12        Primary Care Provider Office Phone # Fax #    Main Hardy [phone] [address]  [facility] [address]        Equal Access to Services     [facility]: Hadii aad ku hadasho Soomaali, waaxda luqadaha, qaybta kaalmada adeegyada, waxamanda miller . So [facility] [phone].    ATENCIÓN: Si habla español, tiene a shrestha disposición servicios gratuitos de asistencia lingüística. [facility] [phone].    We comply with applicable federal civil rights laws and [place] laws. We do not discriminate on the basis of race, color, national origin, age, disability, sex, sexual orientation, or gender identity.            Thank you!     Thank you for choosing [facility]  for your care. Our goal is always to provide you with excellent care. Hearing back from our patients is one way we can continue to improve our services. Please take a few minutes to complete the written survey that you may receive in the mail after your visit with us. Thank you!             Your Updated Medication List - Protect others around you: Learn how to safely use, store and throw away your medicines at www.disposemymeds.org.          This list is accurate as of 5/18/18  6:04 PM.  Always use your most recent med list.                   Brand Name Dispense Instructions for use Diagnosis    ASPIRIN NOT PRESCRIBED    INTENTIONAL    0 each    Please choose reason not prescribed, below    Antiphospholipid antibody syndrome (H)       carvedilol 3.125 MG tablet    COREG    180 tablet    Take 1 tablet (3.125 mg) by mouth 2 times daily (with meals)         fluticasone 50 MCG/ACT spray    FLONASE     Spray 2 sprays into both nostrils daily        GABAPENTIN PO      Take 600 mg by mouth 2 times daily        levETIRAcetam 500 MG tablet    KEPPRA    60 tablet    Take 1 tablet (500 mg) by mouth 2 times daily    Subdural hemorrhage (H)       torsemide 20 MG tablet    DEMADEX     Take 20 mg by mouth daily

## 2018-05-19 LAB
ANION GAP SERPL CALCULATED.3IONS-SCNC: 5 MMOL/L (ref 3–14)
BUN SERPL-MCNC: 16 MG/DL (ref 7–30)
CALCIUM SERPL-MCNC: 9.7 MG/DL (ref 8.5–10.1)
CHLORIDE SERPL-SCNC: 107 MMOL/L (ref 94–109)
CO2 SERPL-SCNC: 28 MMOL/L (ref 20–32)
CREAT SERPL-MCNC: 1 MG/DL (ref 0.66–1.25)
GFR SERPL CREATININE-BSD FRML MDRD: 73 ML/MIN/1.7M2
GLUCOSE SERPL-MCNC: 89 MG/DL (ref 70–99)
IRON SATN MFR SERPL: 8 % (ref 15–46)
IRON SERPL-MCNC: 27 UG/DL (ref 35–180)
POTASSIUM SERPL-SCNC: 4.3 MMOL/L (ref 3.4–5.3)
SODIUM SERPL-SCNC: 140 MMOL/L (ref 133–144)
TIBC SERPL-MCNC: 338 UG/DL (ref 240–430)
VIT B12 SERPL-MCNC: 444 PG/ML (ref 193–986)

## 2018-05-21 ENCOUNTER — APPOINTMENT (OUTPATIENT)
Dept: GENERAL RADIOLOGY | Facility: CLINIC | Age: 73
DRG: 872 | End: 2018-05-21
Attending: EMERGENCY MEDICINE
Payer: MEDICARE

## 2018-05-21 ENCOUNTER — HOSPITAL ENCOUNTER (INPATIENT)
Facility: CLINIC | Age: 73
LOS: 6 days | Discharge: NURSING, REHAB OR LONG-TERM CARE | DRG: 872 | End: 2018-05-27
Attending: EMERGENCY MEDICINE | Admitting: HOSPITALIST
Payer: MEDICARE

## 2018-05-21 DIAGNOSIS — E80.6 HYPERBILIRUBINEMIA: ICD-10-CM

## 2018-05-21 DIAGNOSIS — D69.6 THROMBOCYTOPENIA (H): ICD-10-CM

## 2018-05-21 DIAGNOSIS — A41.9 SEVERE SEPSIS (H): Primary | ICD-10-CM

## 2018-05-21 DIAGNOSIS — F10.21 ALCOHOL DEPENDENCE IN REMISSION (H): ICD-10-CM

## 2018-05-21 DIAGNOSIS — R53.1 GENERALIZED WEAKNESS: ICD-10-CM

## 2018-05-21 DIAGNOSIS — N17.9 ACUTE RENAL FAILURE, UNSPECIFIED ACUTE RENAL FAILURE TYPE (H): ICD-10-CM

## 2018-05-21 DIAGNOSIS — N39.0 URINARY TRACT INFECTION WITHOUT HEMATURIA, SITE UNSPECIFIED: ICD-10-CM

## 2018-05-21 DIAGNOSIS — N13.9 URINARY OBSTRUCTION: ICD-10-CM

## 2018-05-21 DIAGNOSIS — R78.81 BACTEREMIA DUE TO ENTEROCOCCUS: ICD-10-CM

## 2018-05-21 DIAGNOSIS — R65.20 SEVERE SEPSIS (H): Primary | ICD-10-CM

## 2018-05-21 DIAGNOSIS — B95.2 BACTEREMIA DUE TO ENTEROCOCCUS: ICD-10-CM

## 2018-05-21 LAB
ALBUMIN SERPL-MCNC: 3.2 G/DL (ref 3.4–5)
ALBUMIN UR-MCNC: 100 MG/DL
ALP SERPL-CCNC: 105 U/L (ref 40–150)
ALT SERPL W P-5'-P-CCNC: 12 U/L (ref 0–70)
ANION GAP SERPL CALCULATED.3IONS-SCNC: 9 MMOL/L (ref 3–14)
APPEARANCE UR: ABNORMAL
AST SERPL W P-5'-P-CCNC: 32 U/L (ref 0–45)
BACTERIA #/AREA URNS HPF: ABNORMAL /HPF
BASOPHILS # BLD AUTO: 0.1 10E9/L (ref 0–0.2)
BASOPHILS NFR BLD AUTO: 0.7 %
BILIRUB SERPL-MCNC: 1.7 MG/DL (ref 0.2–1.3)
BILIRUB UR QL STRIP: NEGATIVE
BUN SERPL-MCNC: 19 MG/DL (ref 7–30)
CALCIUM SERPL-MCNC: 9 MG/DL (ref 8.5–10.1)
CHLORIDE SERPL-SCNC: 104 MMOL/L (ref 94–109)
CO2 SERPL-SCNC: 25 MMOL/L (ref 20–32)
COLOR UR AUTO: YELLOW
CREAT SERPL-MCNC: 1.22 MG/DL (ref 0.66–1.25)
DIFFERENTIAL METHOD BLD: ABNORMAL
EOSINOPHIL # BLD AUTO: 0 10E9/L (ref 0–0.7)
EOSINOPHIL NFR BLD AUTO: 0 %
ERYTHROCYTE [DISTWIDTH] IN BLOOD BY AUTOMATED COUNT: 18.4 % (ref 10–15)
GFR SERPL CREATININE-BSD FRML MDRD: 58 ML/MIN/1.7M2
GLUCOSE SERPL-MCNC: 118 MG/DL (ref 70–99)
GLUCOSE UR STRIP-MCNC: NEGATIVE MG/DL
HAPTOGLOB SERPL-MCNC: 51 MG/DL (ref 35–175)
HCT VFR BLD AUTO: 34.3 % (ref 40–53)
HGB BLD-MCNC: 11 G/DL (ref 13.3–17.7)
HGB UR QL STRIP: ABNORMAL
IMM GRANULOCYTES # BLD: 0 10E9/L (ref 0–0.4)
IMM GRANULOCYTES NFR BLD: 0.1 %
INTERPRETATION ECG - MUSE: NORMAL
KETONES UR STRIP-MCNC: NEGATIVE MG/DL
LACTATE BLD-SCNC: 1 MMOL/L (ref 0.7–2)
LACTATE BLD-SCNC: 2.2 MMOL/L (ref 0.7–2)
LEUKOCYTE ESTERASE UR QL STRIP: ABNORMAL
LYMPHOCYTES # BLD AUTO: 0.5 10E9/L (ref 0.8–5.3)
LYMPHOCYTES NFR BLD AUTO: 7.3 %
MCH RBC QN AUTO: 26.6 PG (ref 26.5–33)
MCHC RBC AUTO-ENTMCNC: 32.1 G/DL (ref 31.5–36.5)
MCV RBC AUTO: 83 FL (ref 78–100)
MONOCYTES # BLD AUTO: 0.7 10E9/L (ref 0–1.3)
MONOCYTES NFR BLD AUTO: 10.6 %
MUCOUS THREADS #/AREA URNS LPF: PRESENT /LPF
NEUTROPHILS # BLD AUTO: 5.6 10E9/L (ref 1.6–8.3)
NEUTROPHILS NFR BLD AUTO: 81.3 %
NITRATE UR QL: NEGATIVE
NRBC # BLD AUTO: 0 10*3/UL
NRBC BLD AUTO-RTO: 0 /100
PH UR STRIP: 7 PH (ref 5–7)
PLATELET # BLD AUTO: 119 10E9/L (ref 150–450)
POTASSIUM SERPL-SCNC: 4 MMOL/L (ref 3.4–5.3)
PROT SERPL-MCNC: 7.5 G/DL (ref 6.8–8.8)
RBC # BLD AUTO: 4.13 10E12/L (ref 4.4–5.9)
RBC #/AREA URNS AUTO: 117 /HPF (ref 0–2)
SODIUM SERPL-SCNC: 138 MMOL/L (ref 133–144)
SOURCE: ABNORMAL
SP GR UR STRIP: 1.01 (ref 1–1.03)
SQUAMOUS #/AREA URNS AUTO: 1 /HPF (ref 0–1)
TROPONIN I SERPL-MCNC: 0.03 UG/L (ref 0–0.04)
UROBILINOGEN UR STRIP-MCNC: 2 MG/DL (ref 0–2)
WBC # BLD AUTO: 7 10E9/L (ref 4–11)
WBC #/AREA URNS AUTO: >182 /HPF (ref 0–5)
WBC CLUMPS #/AREA URNS HPF: PRESENT /HPF

## 2018-05-21 PROCEDURE — A9270 NON-COVERED ITEM OR SERVICE: HCPCS | Mod: GY | Performed by: EMERGENCY MEDICINE

## 2018-05-21 PROCEDURE — 96361 HYDRATE IV INFUSION ADD-ON: CPT

## 2018-05-21 PROCEDURE — 87040 BLOOD CULTURE FOR BACTERIA: CPT | Performed by: EMERGENCY MEDICINE

## 2018-05-21 PROCEDURE — 87186 SC STD MICRODIL/AGAR DIL: CPT | Performed by: EMERGENCY MEDICINE

## 2018-05-21 PROCEDURE — 80053 COMPREHEN METABOLIC PANEL: CPT | Performed by: EMERGENCY MEDICINE

## 2018-05-21 PROCEDURE — 12000000 ZZH R&B MED SURG/OB

## 2018-05-21 PROCEDURE — 99285 EMERGENCY DEPT VISIT HI MDM: CPT | Mod: 25

## 2018-05-21 PROCEDURE — 96365 THER/PROPH/DIAG IV INF INIT: CPT

## 2018-05-21 PROCEDURE — 85025 COMPLETE CBC W/AUTO DIFF WBC: CPT | Performed by: EMERGENCY MEDICINE

## 2018-05-21 PROCEDURE — HZ2ZZZZ DETOXIFICATION SERVICES FOR SUBSTANCE ABUSE TREATMENT: ICD-10-PCS | Performed by: INTERNAL MEDICINE

## 2018-05-21 PROCEDURE — 25000128 H RX IP 250 OP 636: Performed by: EMERGENCY MEDICINE

## 2018-05-21 PROCEDURE — 99223 1ST HOSP IP/OBS HIGH 75: CPT | Mod: AI | Performed by: HOSPITALIST

## 2018-05-21 PROCEDURE — 25000132 ZZH RX MED GY IP 250 OP 250 PS 637: Mod: GY | Performed by: EMERGENCY MEDICINE

## 2018-05-21 PROCEDURE — 25000128 H RX IP 250 OP 636: Performed by: HOSPITALIST

## 2018-05-21 PROCEDURE — 87088 URINE BACTERIA CULTURE: CPT | Performed by: EMERGENCY MEDICINE

## 2018-05-21 PROCEDURE — 87077 CULTURE AEROBIC IDENTIFY: CPT | Performed by: EMERGENCY MEDICINE

## 2018-05-21 PROCEDURE — 93005 ELECTROCARDIOGRAM TRACING: CPT

## 2018-05-21 PROCEDURE — 83605 ASSAY OF LACTIC ACID: CPT | Performed by: EMERGENCY MEDICINE

## 2018-05-21 PROCEDURE — 71046 X-RAY EXAM CHEST 2 VIEWS: CPT

## 2018-05-21 PROCEDURE — 84484 ASSAY OF TROPONIN QUANT: CPT | Performed by: EMERGENCY MEDICINE

## 2018-05-21 PROCEDURE — 87086 URINE CULTURE/COLONY COUNT: CPT | Performed by: EMERGENCY MEDICINE

## 2018-05-21 PROCEDURE — 87800 DETECT AGNT MULT DNA DIREC: CPT | Performed by: EMERGENCY MEDICINE

## 2018-05-21 PROCEDURE — 81001 URINALYSIS AUTO W/SCOPE: CPT | Performed by: EMERGENCY MEDICINE

## 2018-05-21 RX ORDER — POTASSIUM CHLORIDE 1500 MG/1
20-40 TABLET, EXTENDED RELEASE ORAL
Status: DISCONTINUED | OUTPATIENT
Start: 2018-05-21 | End: 2018-05-27 | Stop reason: HOSPADM

## 2018-05-21 RX ORDER — NALOXONE HYDROCHLORIDE 0.4 MG/ML
.1-.4 INJECTION, SOLUTION INTRAMUSCULAR; INTRAVENOUS; SUBCUTANEOUS
Status: DISCONTINUED | OUTPATIENT
Start: 2018-05-21 | End: 2018-05-27 | Stop reason: HOSPADM

## 2018-05-21 RX ORDER — LIDOCAINE 40 MG/G
CREAM TOPICAL
Status: DISCONTINUED | OUTPATIENT
Start: 2018-05-21 | End: 2018-05-27 | Stop reason: HOSPADM

## 2018-05-21 RX ORDER — POTASSIUM CHLORIDE 7.45 MG/ML
10 INJECTION INTRAVENOUS
Status: DISCONTINUED | OUTPATIENT
Start: 2018-05-21 | End: 2018-05-27 | Stop reason: HOSPADM

## 2018-05-21 RX ORDER — ACETAMINOPHEN 650 MG/1
650 SUPPOSITORY RECTAL EVERY 4 HOURS PRN
Status: DISCONTINUED | OUTPATIENT
Start: 2018-05-21 | End: 2018-05-27 | Stop reason: HOSPADM

## 2018-05-21 RX ORDER — PROCHLORPERAZINE 25 MG
12.5 SUPPOSITORY, RECTAL RECTAL EVERY 12 HOURS PRN
Status: DISCONTINUED | OUTPATIENT
Start: 2018-05-21 | End: 2018-05-27 | Stop reason: HOSPADM

## 2018-05-21 RX ORDER — ACETAMINOPHEN 500 MG
1000 TABLET ORAL ONCE
Status: COMPLETED | OUTPATIENT
Start: 2018-05-21 | End: 2018-05-21

## 2018-05-21 RX ORDER — ONDANSETRON 4 MG/1
4 TABLET, ORALLY DISINTEGRATING ORAL EVERY 6 HOURS PRN
Status: DISCONTINUED | OUTPATIENT
Start: 2018-05-21 | End: 2018-05-27 | Stop reason: HOSPADM

## 2018-05-21 RX ORDER — SODIUM CHLORIDE 9 MG/ML
INJECTION, SOLUTION INTRAVENOUS CONTINUOUS
Status: DISCONTINUED | OUTPATIENT
Start: 2018-05-21 | End: 2018-05-21

## 2018-05-21 RX ORDER — POTASSIUM CHLORIDE 29.8 MG/ML
20 INJECTION INTRAVENOUS
Status: DISCONTINUED | OUTPATIENT
Start: 2018-05-21 | End: 2018-05-27 | Stop reason: HOSPADM

## 2018-05-21 RX ORDER — ACETAMINOPHEN 325 MG/1
650 TABLET ORAL EVERY 4 HOURS PRN
Status: DISCONTINUED | OUTPATIENT
Start: 2018-05-21 | End: 2018-05-27 | Stop reason: HOSPADM

## 2018-05-21 RX ORDER — POTASSIUM CL/LIDO/0.9 % NACL 10MEQ/0.1L
10 INTRAVENOUS SOLUTION, PIGGYBACK (ML) INTRAVENOUS
Status: DISCONTINUED | OUTPATIENT
Start: 2018-05-21 | End: 2018-05-27 | Stop reason: HOSPADM

## 2018-05-21 RX ORDER — SODIUM CHLORIDE 9 MG/ML
INJECTION, SOLUTION INTRAVENOUS CONTINUOUS
Status: DISCONTINUED | OUTPATIENT
Start: 2018-05-21 | End: 2018-05-25

## 2018-05-21 RX ORDER — OXYCODONE HYDROCHLORIDE 5 MG/1
5 TABLET ORAL EVERY 6 HOURS PRN
Status: DISCONTINUED | OUTPATIENT
Start: 2018-05-21 | End: 2018-05-27 | Stop reason: HOSPADM

## 2018-05-21 RX ORDER — AMOXICILLIN 250 MG
2 CAPSULE ORAL 2 TIMES DAILY PRN
Status: DISCONTINUED | OUTPATIENT
Start: 2018-05-21 | End: 2018-05-27 | Stop reason: HOSPADM

## 2018-05-21 RX ORDER — PROCHLORPERAZINE MALEATE 5 MG
5 TABLET ORAL EVERY 6 HOURS PRN
Status: DISCONTINUED | OUTPATIENT
Start: 2018-05-21 | End: 2018-05-27 | Stop reason: HOSPADM

## 2018-05-21 RX ORDER — AMOXICILLIN 250 MG
1 CAPSULE ORAL 2 TIMES DAILY PRN
Status: DISCONTINUED | OUTPATIENT
Start: 2018-05-21 | End: 2018-05-27 | Stop reason: HOSPADM

## 2018-05-21 RX ORDER — ONDANSETRON 2 MG/ML
4 INJECTION INTRAMUSCULAR; INTRAVENOUS EVERY 6 HOURS PRN
Status: DISCONTINUED | OUTPATIENT
Start: 2018-05-21 | End: 2018-05-27 | Stop reason: HOSPADM

## 2018-05-21 RX ORDER — POTASSIUM CHLORIDE 1.5 G/1.58G
20-40 POWDER, FOR SOLUTION ORAL
Status: DISCONTINUED | OUTPATIENT
Start: 2018-05-21 | End: 2018-05-27 | Stop reason: HOSPADM

## 2018-05-21 RX ORDER — PIPERACILLIN SODIUM, TAZOBACTAM SODIUM 4; .5 G/20ML; G/20ML
4.5 INJECTION, POWDER, LYOPHILIZED, FOR SOLUTION INTRAVENOUS ONCE
Status: COMPLETED | OUTPATIENT
Start: 2018-05-21 | End: 2018-05-21

## 2018-05-21 RX ORDER — POLYETHYLENE GLYCOL 3350 17 G/17G
17 POWDER, FOR SOLUTION ORAL DAILY PRN
Status: DISCONTINUED | OUTPATIENT
Start: 2018-05-21 | End: 2018-05-27 | Stop reason: HOSPADM

## 2018-05-21 RX ORDER — CEFTRIAXONE 1 G/1
1 INJECTION, POWDER, FOR SOLUTION INTRAMUSCULAR; INTRAVENOUS EVERY 24 HOURS
Status: DISCONTINUED | OUTPATIENT
Start: 2018-05-21 | End: 2018-05-27 | Stop reason: HOSPADM

## 2018-05-21 RX ADMIN — CEFTRIAXONE 1 G: 1 INJECTION, POWDER, FOR SOLUTION INTRAMUSCULAR; INTRAVENOUS at 21:06

## 2018-05-21 RX ADMIN — SODIUM CHLORIDE 2871 ML: 9 INJECTION, SOLUTION INTRAVENOUS at 17:42

## 2018-05-21 RX ADMIN — ACETAMINOPHEN 1000 MG: 500 TABLET, FILM COATED ORAL at 17:40

## 2018-05-21 RX ADMIN — PIPERACILLIN SODIUM,TAZOBACTAM SODIUM 4.5 G: 4; .5 INJECTION, POWDER, FOR SOLUTION INTRAVENOUS at 18:08

## 2018-05-21 RX ADMIN — SODIUM CHLORIDE: 9 INJECTION, SOLUTION INTRAVENOUS at 21:06

## 2018-05-21 ASSESSMENT — ENCOUNTER SYMPTOMS
RHINORRHEA: 0
NAUSEA: 0
VOMITING: 0
HEADACHES: 0
WEAKNESS: 1
ABDOMINAL PAIN: 0
DIFFICULTY URINATING: 1
SORE THROAT: 0
FEVER: 1

## 2018-05-21 NOTE — IP AVS SNAPSHOT
` ` Patient Information     Patient Name Sex     Antonio Ramirez ([id number]) Male 1945       Room Bed    [facility] 8806-01      Patient Demographics     Address Phone E-mail Address    [address] [address] [phone] (Home)  none (Work)  [phone] (Mobile) *Preferred* [email or web address]      Patient Ethnicity & Race     Ethnic Group Patient Race    American White      Emergency Contact(s)     Name Relation Home Work Mobile    Helena Ramirez Spouse [phone] none [phone]    María Ramirez Daughter [phone]        Documents on File        Status Date Received Description       Documents for the Patient    Privacy Notice - [place] Received 14     External Medication Information Consent Accepted 14     Consent for Services - Hospital/Clinic  ()      Consent for EHR Access Received 14     [facility] Specified Other       Consent for Services - Hospital/Clinic Received () 14     Consent for Services/Privacy Notice - Hospital/Clinic Received () 10/12/16     Insurance Card Received () 17 BCBS    Patient ID Received 17 [place] 2019    Care Everywhere Prospective Auth Received 10/20/17     Consent for Services/Privacy Notice - Hospital/Clinic Received 10/20/17     Consent for Services/Privacy Notice - Hospital/Clinic  17 CONSENT FOR SERVICE    Consent for Services - Geriatrics Received 17     Insurance Card Received 18 BCBS    Insurance Card Received 18 MEDICARE    Consent for Services/Privacy Notice - Hospital/Clinic Received 18     Consent for Services - Hospital and Clinic Received 18     HIE Auth Received 18     Physical Therapy Certification Received 18 to 18    Consent to Communicate  18 AUTHORIZATION TO DISCUSS PROTECTED  HEALTH INFORMATION 18    Business/Insurance/Care Coordination/Health Form - Patient  18 ADULT REHABILITATION ATTENDANCE POLICY 18     Occupational Therapy Certification Received 05/22/18 eval only    Insurance Card Received (Deleted) 06/24/14 BCBS    Patient ID Received (Deleted) 06/24/14 [place] DL    Insurance Card Received (Deleted) 10/12/16     Patient ID Received (Deleted) 10/12/16     Insurance Card Received (Deleted) 09/25/17 Medicare    Patient ID Received (Deleted) 09/28/17     CE Persistent Point of Care Auth Received 01/29/18 CE Persistent Point of Care Authorization       Documents for the Encounter    CMS IM for Patient Signature Received 05/22/18     EMS/Ambulance Record  05/21/18 [place] FIRE DEPARTMENT    CMS IM for Patient Signature Received 05/27/18  2MM      Admission Information     Attending Provider Admitting Provider Admission Type Admission Date/Time    Solomon Franklin MD [place], [place], MD Emergency 05/21/18  1709    Discharge Date Hospital Service Auth/Cert Status Service Area     Hospitalist [facility]    Unit Room/Bed Admission Status        88 ONCOLOGY 8806/8806-01 Admission (Confirmed)       Admission     Complaint    UTI (urinary tract infection)      Hospital Account     Name Acct ID Class Status Primary Coverage    Antonio Ramirez [id number] Inpatient Open MEDICARE - MEDICARE            Guarantor Account (for Hospital Account #[id number])     Name Relation to Pt Service Area Active? Acct Type    Antonio Ramirez Self FCS Yes Personal/Family    Address Phone          [address] [place] [address] [phone](H)  None(O)              Coverage Information (for Hospital Account #[id number])     1. MEDICARE/MEDICARE     F/O Payor/Plan Precert #    MEDICARE/MEDICARE     Subscriber Subscriber #    Antonio Ramirez [id number]    Address Phone    [employer]  [address]  [place] [address] [phone]          2. BC[place]     F/O Payor/Plan Precert #    [place]     Subscriber Subscriber #    Antonio Ramirez [id number]    Address Phone    [address] [address]  [place] [id number] [phone]

## 2018-05-21 NOTE — ED NOTES
Bed: ED23  Expected date:   Expected time:   Means of arrival:   Comments:  E2  73 M weakness  7527

## 2018-05-21 NOTE — IP AVS SNAPSHOT
"[place] [address] ONCOLOGY: [phone]                                              INTERAGENCY TRANSFER FORM - LAB / IMAGING / EKG / EMG RESULTS   2018                    Hospital Admission Date: 2018  GALILEA LUGO   : 1945  Sex: Male        Attending Provider: Solomon Franklin MD     Allergies:  Hmg-coa-r Inhibitors, Ciprofloxacin    Infection:  None   Service:  HOSPITALIST    Ht:  1.854 m (6' 1\")   Wt:  105.1 kg (231 lb 12 oz)   Admission Wt:  96.2 kg (212 lb)    BMI:  30.58 kg/m 2   BSA:  2.33 m 2            Patient PCP Information     Provider PCP Type    Main Hardy MD General         Lab Results - 3 Days      Basic metabolic panel [[id number]] (Abnormal)  Resulted: 18 0642, Result status: Final result    Ordering provider: Omaira Coleman DO  18 0000 Resulting lab: [facility]    Specimen Information    Type Source Collected On   Blood  18 0608          Components       Value Reference Range Flag Lab   Sodium 137 133 - 144 mmol/L  FrStHsLb   Potassium 4.7 3.4 - 5.3 mmol/L  FrStHsLb   Chloride 110 94 - 109 mmol/L H FrStHsLb   Carbon Dioxide 21 20 - 32 mmol/L  FrStHsLb   Anion Gap 6 3 - 14 mmol/L  FrStHsLb   Glucose 94 70 - 99 mg/dL  FrStHsLb   Urea Nitrogen 28 7 - 30 mg/dL  FrStHsLb   Creatinine 1.23 0.66 - 1.25 mg/dL  FrStHsLb   GFR Estimate 57 >60 mL/min/1.7m2 L FrStHsLb   Comment:  Non  GFR Calc   GFR Estimate If Black 69 >60 mL/min/1.7m2  FrStHsLb   Comment:  African American GFR Calc   Calcium 8.6 8.5 - 10.1 mg/dL  FrStHsLb            CBC with platelets [[id number]] (Abnormal)  Resulted: 18 0633, Result status: Final result    Ordering provider: Omaira Coleman DO  18 0000 Resulting lab: [facility]    Specimen Information    Type Source Collected On   Blood  18 0608          Components       Value Reference Range Flag Lab   WBC 5.2 4.0 - 11.0 10e9/L  FrStHsLb   RBC " Count 3.31 4.4 - 5.9 10e12/L L FrStHsLb   Hemoglobin 8.9 13.3 - 17.7 g/dL L FrStHsLb   Hematocrit 27.4 40.0 - 53.0 % L FrStHsLb   MCV 83 78 - 100 fl  FrStHsLb   MCH 26.9 26.5 - 33.0 pg  FrStHsLb   MCHC 32.5 31.5 - 36.5 g/dL  FrStHsLb   RDW 19.0 10.0 - 15.0 % H FrStHsLb   Platelet Count 106 150 - 450 10e9/L L FrStHsLb            Urine Culture Aerobic Bacterial [[id number]] (Abnormal)  Resulted: 05/25/18 1528, Result status: Final result    Ordering provider: Missy Lindquist MD  05/21/18 1721 Resulting lab: [facility]    Specimen Information    Type Source Collected On   Midstream Urine Urine clean catch 05/21/18 1745          Components       Value Reference Range Flag Lab   Specimen Description Midstream Urine      Special Requests Specimen received in preservative   75   Culture Micro --  A 225   Result:         >100,000 colonies/mL  Klebsiella pneumoniae     Culture Micro --  A 225   Result:         >100,000 colonies/mL  Strain 2  Klebsiella pneumoniae              Basic metabolic panel [[id number]] (Abnormal)  Resulted: 05/25/18 0743, Result status: Final result    Ordering provider: Alejandra Castillo MD  05/25/18 0000 Resulting lab: [facility]    Specimen Information    Type Source Collected On   Blood  05/25/18 0707          Components       Value Reference Range Flag Lab   Sodium 139 133 - 144 mmol/L  FrStHsLb   Potassium 4.1 3.4 - 5.3 mmol/L  FrStHsLb   Chloride 111 94 - 109 mmol/L H FrStHsLb   Carbon Dioxide 19 20 - 32 mmol/L L FrStHsLb   Anion Gap 9 3 - 14 mmol/L  FrStHsLb   Glucose 80 70 - 99 mg/dL  FrStHsLb   Urea Nitrogen 30 7 - 30 mg/dL  FrStHsLb   Creatinine 1.40 0.66 - 1.25 mg/dL H FrStHsLb   GFR Estimate 49 >60 mL/min/1.7m2 L FrStHsLb   Comment:  Non  GFR Calc   GFR Estimate If Black 60 >60 mL/min/1.7m2 L FrStHsLb   Comment:  African American GFR Calc   Calcium 7.8 8.5 - 10.1 mg/dL L FrStHsLb            CBC with platelets differential  [[id number]] (Abnormal)  Resulted: 05/25/18 0727, Result status: Final result    Ordering provider: Alejandra Castillo MD  05/25/18 0000 Resulting lab: [facility]    Specimen Information    Type Source Collected On   Blood  05/25/18 0707          Components       Value Reference Range Flag Lab   WBC 5.1 4.0 - 11.0 10e9/L  FrStHsLb   RBC Count 3.32 4.4 - 5.9 10e12/L L FrStHsLb   Hemoglobin 8.9 13.3 - 17.7 g/dL L FrStHsLb   Hematocrit 27.7 40.0 - 53.0 % L FrStHsLb   MCV 83 78 - 100 fl  FrStHsLb   MCH 26.8 26.5 - 33.0 pg  FrStHsLb   MCHC 32.1 31.5 - 36.5 g/dL  FrStHsLb   RDW 19.1 10.0 - 15.0 % H FrStHsLb   Platelet Count 76 150 - 450 10e9/L L FrStHsLb   Diff Method Automated Method   FrStHsLb   % Neutrophils 61.8 %  FrStHsLb   % Lymphocytes 18.5 %  FrStHsLb   % Monocytes 17.7 %  FrStHsLb   % Eosinophils 1.4 %  FrStHsLb   % Basophils 0.6 %  FrStHsLb   % Immature Granulocytes 0.0 %  FrStHsLb   Nucleated RBCs 0 0 /100  FrStHsLb   Absolute Neutrophil 3.2 1.6 - 8.3 10e9/L  FrStHsLb   Absolute Lymphocytes 1.0 0.8 - 5.3 10e9/L  FrStHsLb   Absolute Monocytes 0.9 0.0 - 1.3 10e9/L  FrStHsLb   Absolute Eosinophils 0.1 0.0 - 0.7 10e9/L  FrStHsLb   Absolute Basophils 0.0 0.0 - 0.2 10e9/L  FrStHsLb   Abs Immature Granulocytes 0.0 0 - 0.4 10e9/L  FrStHsLb   Absolute Nucleated RBC 0.0   FrStHsLb            Blood culture [[id number]] (Abnormal)  Resulted: 05/24/18 1457, Result status: Final result    Ordering provider: Missy Lindquist MD  05/21/18 1721 Resulting lab: [facility]    Specimen Information    Type Source Collected On   Blood Arm, Right 05/21/18 1745   Comment:  Right Arm          Components       Value Reference Range Flag Lab   Specimen Description Blood Right Arm      Special Requests Aerobic and anaerobic bottles received   FrStHsLb   Culture Micro --  A 225   Result:         Cultured on the 1st day of incubation:  Klebsiella pneumoniae     Culture Micro --   225   Result:          Critical Value/Significant Value, preliminary result only, called to and read back by  Summer Ann RN, @1700 05/22/18..     Culture Micro Susceptibility testing done on previous specimen   225   Result:              Blood culture [[id number]] (Abnormal)  Resulted: 05/24/18 1454, Result status: Final result    Ordering provider: Missy Lindquist MD  05/21/18 1721 Resulting lab: [facility]    Specimen Information    Type Source Collected On   Blood Arm, Right 05/21/18 1719   Comment:  Right Arm          Components       Value Reference Range Flag Lab   Specimen Description Blood Right Arm      Special Requests Aerobic and anaerobic bottles received   FrStHsLb   Culture Micro --  A 225   Result:         Cultured on the 1st day of incubation:  Klebsiella pneumoniae     Culture Micro --   225   Result:         Critical Value/Significant Value, preliminary result only, called to and read back by  Angus Lo RN SH88 @ 1021 5.22.18      Culture Micro --   225   Result:         (Note)  NEGATIVE for the following organisms and resistance markers:  Acinetobacter sp., Citrobacter sp., Enterobacter sp., Proteus sp., E.  coli, K. pneumoniae/oxytoca, P. aeruginosa, CTX-M, KPC, NDM, VIM, IMP  and OXA by Rhone Appareligene multiplex nucleic acid test. Final identification  and antimicrobial susceptibility testing will be verified by standard  methods.    Critical Value/Significant Value called to and read back by Angus Lo RN SH88 @ 1241 05.22.18               Platelet function closure with reflex [[id number]]  Resulted: 05/24/18 0838, Result status: Final result    Ordering provider: Patricio Dye MD  05/24/18 0633 Resulting lab: [facility]    Specimen Information    Type Source Collected On   Blood  05/24/18 0700          Components       Value Reference Range Flag Lab   PLT Funct COL/EPI 99 <170 sec  FrStHsLb            CBC with platelets differential  [[id number]] (Abnormal)  Resulted: 05/24/18 0828, Result status: Final result    Ordering provider: Patricio Dye MD  05/24/18 0633 Resulting lab: [facility]    Specimen Information    Type Source Collected On   Blood  05/24/18 0700          Components       Value Reference Range Flag Lab   WBC 5.4 4.0 - 11.0 10e9/L  FrStHsLb   RBC Count 3.37 4.4 - 5.9 10e12/L L FrStHsLb   Hemoglobin 9.0 13.3 - 17.7 g/dL L FrStHsLb   Hematocrit 28.2 40.0 - 53.0 % L FrStHsLb   MCV 84 78 - 100 fl  FrStHsLb   MCH 26.7 26.5 - 33.0 pg  FrStHsLb   MCHC 31.9 31.5 - 36.5 g/dL  FrStHsLb   RDW 19.2 10.0 - 15.0 % H FrStHsLb   Platelet Count 68 150 - 450 10e9/L L FrStHsLb   Diff Method Manual Differential   FrStHsLb   % Neutrophils 80.0 %  FrStHsLb   % Lymphocytes 11.0 %  FrStHsLb   % Monocytes 8.0 %  FrStHsLb   % Eosinophils 1.0 %  FrStHsLb   % Basophils 0.0 %  FrStHsLb   Absolute Neutrophil 4.3 1.6 - 8.3 10e9/L  FrStHsLb   Absolute Lymphocytes 0.6 0.8 - 5.3 10e9/L L FrStHsLb   Absolute Monocytes 0.4 0.0 - 1.3 10e9/L  FrStHsLb   Absolute Eosinophils 0.1 0.0 - 0.7 10e9/L  FrStHsLb   Absolute Basophils 0.0 0.0 - 0.2 10e9/L  FrStHsLb   Teardrop Cells Slight   FrStHsLb   Acanthocytes Slight   FrStHsLb   Ovalocytes Slight   FrStHsLb   Grants Cells Slight   FrStHsLb   Platelet Estimate Automated count confirmed.  Platelet morphology is normal.   FrStHsLb            Basic metabolic panel [[id number]] (Abnormal)  Resulted: 05/24/18 0797, Result status: Final result    Ordering provider: Alejandra Castillo MD  05/24/18 0001 Resulting lab: [facility]    Specimen Information    Type Source Collected On   Blood  05/24/18 0700          Components       Value Reference Range Flag Lab   Sodium 138 133 - 144 mmol/L  FrStHsLb   Potassium 4.0 3.4 - 5.3 mmol/L  FrStHsLb   Chloride 110 94 - 109 mmol/L H FrStHsLb   Carbon Dioxide 18 20 - 32 mmol/L L FrStHsLb   Anion Gap 10 3 - 14 mmol/L  FrStHsLb   Glucose 81 70 - 99 mg/dL   "FrStHsLb   Urea Nitrogen 32 7 - 30 mg/dL H FrStHsLb   Creatinine 1.57 0.66 - 1.25 mg/dL H FrStHsLb   GFR Estimate 43 >60 mL/min/1.7m2 L FrStHsLb   Comment:  Non  GFR Calc   GFR Estimate If Black 52 >60 mL/min/1.7m2 L FrStHsLb   Comment:  African American GFR Calc   Calcium 7.4 8.5 - 10.1 mg/dL L FrStHsLb            INR [[id number]] (Abnormal)  Resulted: 05/24/18 0727, Result status: Final result    Ordering provider: Patricio Dye MD  05/24/18 0633 Resulting lab: [facility]    Specimen Information    Type Source Collected On   Blood  05/24/18 0700          Components       Value Reference Range Flag Lab   INR 1.68 0.86 - 1.14 H FrStHsLb            Partial thromboplastin time [[id number]]  Resulted: 05/24/18 0727, Result status: Final result    Ordering provider: Patricio Dye MD  05/24/18 0633 Resulting lab: [facility]    Specimen Information    Type Source Collected On   Blood  05/24/18 0700          Components       Value Reference Range Flag Lab   PTT 37 22 - 37 sec  FrStHsLb            Testing Performed By     Lab - Abbreviation Name Director Address Valid Date Range    14 - FrStHsLb [facility] Unknown [address] [place] [address] 05/08/15 1057 - Present    75 - Unknown [facility] Unknown [address] [facility] [address] 01/15/15 1019 - Present    225 - Unknown [facility] Unknown [address] [facility] [address] 12/19/14 0954 - Present            Unresulted Labs (24h ago through future)    Start       Ordered    05/28/18 0600  Basic metabolic panel  AM DRAW,   Routine      05/27/18 0946    Unscheduled  Potassium  (Potassium Replacement - \"Standard\" - For K levels less than 3.4 mmol/L - UU,UR,UA,RH,SH,PH,WY )  CONDITIONAL (SPECIFY),   Routine     Comments:  Obtain Potassium Level for these conditions:  *IF no potassium result within 24 hours before " initiation of order set, draw potassium level with next lab collect.    *2 HOURS AFTER last IV potassium replacement dose and 4 hours after an oral replacement dose.  *Next morning after potassium dose.     Repeat Potassium Replacement if necessary.    05/21/18 2051         Imaging Results - 3 Days      IR Ureteral Stent Placement Bilateral [[id number]]  Resulted: 05/24/18 2252, Result status: Final result    Ordering provider: Kaylynn Caballero PA-C  05/23/18 1631 Resulted by: Patricio Dye MD    Performed: 05/24/18 0820 - 05/24/18 0934 Resulting lab: RADIOLOGY RESULTS    Narrative:       INTERVENTIONAL RADIOLOGY URETERAL STENT PLACEMENT BILATERAL 5/24/2018  9:34 AM    HISTORY: 73-year-old patient presenting with urosepsis on May 22,  2018. Patient had bilateral percutaneous nephrostomy tubes placed at  that time. Patient is relatively large, though not completely  obstructing calcifications in each ureter. Plan is for placement of  ureteral stent in exchange of nephrostomy tubes.    TECHNIQUE: Patient was brought to the interventional radiology  department and informed consent obtained. Patient was placed in a  prone position. Each percutaneous nephrostomy tube and surrounding  skin were prepped and draped in standard sterile fashion. 1% lidocaine  was administered around each tube. Spot fluoroscopic images were  obtained before and after contrast injection through each tube. Each  tube was cut and a stiff angled Glidewire was advanced through the  tube and into the bladder. 8 Serbian x 26 cm ureteral stents were then  advanced over the Glidewire into the bladder. Glidewire was maintained  in the renal collecting systems, over which new bilateral 8 Serbian  percutaneous nephrostomy tubes were deployed into each renal pelvis.  Contrast was again injected to confirm appropriate position. Each  percutaneous nephrostomy tube was secured in position with Prolene  suture. All 4 tubes were confirmed to be in  good position by  completion.    Sedation: 1.5 mg IV Versed and 50 mcg IV fentanyl.  Sedation time: 45 minutes.    Please note the above medications were administered by the  interventional radiology staff under my direct supervision. Patient's  vital signs were monitored and remained stable throughout the  procedure.    Fluoroscopic time: 5.9 minutes  Total fluoroscopic dose: 42 mGy  Contrast: 40 mL of Isovue administered into each collecting system  without complication.  Local anesthetic: 20 mL of 1% lidocaine.    FINDINGS: Total of 9 spot fluoroscopic images were obtained throughout  the procedure. Initial nephrostograms demonstrate nondilated renal  collecting systems and patent ureters. At completion, bilateral  ureteral stents in good position as well as exchanged 8 Mauritanian  percutaneous nephrostomy tubes.      Impression:       IMPRESSION:  1. Initial bilateral nephrostograms demonstrate patent ureters without  hydronephrosis.  2. Successful placement of bilateral 8 Mauritanian x 26 cm ureteral stents.  3. Successful exchange of bilateral 8 Mauritanian percutaneous nephrostomy  tubes.    The nephrostomy tubes are to be kept to gravity drainage with periodic  flushing. The tubes are to remain in place for several days given  patient's recent history of urosepsis and inconsistent drainage  through the nephrostomy tubes. Patient will return early next week for  repeat nephrostograms and hopeful nephrostomy tube removal.    NICK LEBLANC MD      CT Abdomen Pelvis w/o Contrast [[id number]]  Resulted: 05/24/18 0947, Result status: Final result    Ordering provider: Alejandra Castillo MD  05/22/18 1103 Resulted by: Juarez Cardona MD    Performed: 05/22/18 1553 - 05/22/18 1602 Resulting lab: RADIOLOGY RESULTS    Narrative:       CT ABDOMEN AND PELVIS WITHOUT CONTRAST 5/22/2018 4:02 PM     HISTORY: UTI, sepsis. Worsened Creatinine.      COMPARISON: None.    TECHNIQUE: Axial CT images of the abdomen and pelvis from  the  diaphragm to the symphysis pubis were acquired without IV contrast.  Radiation dose for this scan was reduced using automated exposure  control, adjustment of the mA and/or kV according to patient size, or  iterative reconstruction technique.    FINDINGS: There is a stone which measures 1.1 cm in maximal axial  dimension and 1.7 cm in length in the proximal left ureter. Just above  this is a 7 mm stone on the left. There is mild left hydronephrosis.  In the distal right ureter there are two stones adjacent to one  another measuring 8 mm in maximal dimension. There is mild-moderate  hydronephrosis proximal to this on the right. There are approximately  eight nonobstructing stones in the left kidney measuring up to 8 mm.  No intrarenal stones are seen on the right. There is mild bilateral  perinephric fat stranding. Kidneys are normal in size and  configuration.  There is moderate diverticulosis without evidence for  diverticulitis. There are no dilated loops of small intestine or large  bowel to suggest ileus or obstruction. Minimal free fluid. No free  air. Somewhat lobular contour to the liver noted which is nonspecific.  Cholecystectomy changes. There are extensive atherosclerotic changes  of the visualized aorta and its branches. There is no evidence of  aortic aneurysm.  No splenomegaly.  No definite adrenal nodules.   Pancreas grossly unremarkable. The remainder of the visualized abdomen  is unremarkable on this noncontrast scan. Survey of the visualized  bony structures demonstrates no destructive bony lesions. Small right  pleural effusion with associated compressive atelectasis and/or  infiltrate. Minimal probable atelectasis associated with calcified  pleural plaque at the left base. There are extensive coronary vascular  calcifications and/or stents consistent with coronary artery disease.      Impression:       IMPRESSION:   1. Stones measuring 1.1 and 0.7 cm in maximal axial dimension are seen  in  the proximal left ureter. There is mild proximal hydronephrosis.  2. Two stones measuring 8 mm in maximal axial dimension are seen in  the distal right ureter with mild-moderate proximal hydronephrosis.    CARMEN SERRATO MD      Testing Performed By     Lab - Abbreviation Name Director Address Valid Date Range    104 - Rad Rslts RADIOLOGY RESULTS Unknown Unknown 02/16/05 1553 - Present            Encounter-Level Documents:     There are no encounter-level documents.      Order-Level Documents:     There are no order-level documents.

## 2018-05-21 NOTE — IP AVS SNAPSHOT
"` [place] [address] ONCOLOGY: [phone]                                              INTERAGENCY TRANSFER FORM - NURSING   2018                    Hospital Admission Date: 2018  GALILEA LUGO   : 1945  Sex: Male        Attending Provider: Solomon Franklin MD     Allergies:  Hmg-coa-r Inhibitors, Ciprofloxacin    Infection:  None   Service:  HOSPITALIST    Ht:  1.854 m (6' 1\")   Wt:  105.1 kg (231 lb 12 oz)   Admission Wt:  96.2 kg (212 lb)    BMI:  30.58 kg/m 2   BSA:  2.33 m 2            Patient PCP Information     Provider PCP Type    Main Hardy MD General      Current Code Status     Date Active Code Status Order ID Comments User Context       Prior      Code Status History     Date Active Date Inactive Code Status Order ID Comments User Context    2018  3:07 PM  Full Code [id number]  Omaira Coleman DO Outpatient    2018  8:51 PM 2018  3:07 PM Full Code [id number]  Solomon Franklin MD Inpatient    2018  3:00 PM 2018  8:51 PM Full Code [id number]  Kyle Bobby Outpatient    2018 12:49 PM 2018  3:00 PM Full Code [id number]  Coy Us MD Inpatient    2018  8:20 AM 2018 12:49 PM Full Code [id number]  Keshia Black MD Outpatient    2018  5:25 PM 2018  8:20 AM Full Code [id number]  Nidia Coon MD Inpatient    2018  5:25 PM 2018  5:25 PM Full Code [id number]  Jono Issa MD Inpatient    2018  2:10 AM 2018  4:13 PM Full Code [id number]  Von Gomez MD Inpatient    2017  9:04 PM 12/10/2017  1:39 PM Full Code [id number]  Saima Howard MD Inpatient    2017  1:59 PM 2017  9:04 PM Full Code [id number]  Saima Howard MD Inpatient    2017 10:29 AM 2017  1:59 PM Full Code [id number]  Von Gomez MD Inpatient    2017 10:28 AM 2017 10:29 AM Full Code [id number]  Lejeune, Stacey, MD Inpatient    12/3/2017  3:04 PM " 12/4/2017 10:28 AM Full Code [id number]  Omaira Coleman DO Inpatient    12/1/2017  7:52 PM 12/3/2017  3:04 PM Special Code [id number] Parameters:  Nursing to determine Saima Howard MD Inpatient    11/29/2017  7:56 PM 12/1/2017  7:52 PM Full Code [id number]  Davie Phillips MD Inpatient    9/21/2014  5:31 PM 11/29/2017  7:56 PM Full Code [id number]  Davie Gonzales PA-C Outpatient    9/19/2014  1:39 PM 9/21/2014  5:31 PM Full Code [id number]  Cecelia Garcia MD Inpatient      Advance Directives        Scanned docmt in ACP Activity?           No scanned doc        Hospital Problems as of 5/27/2018              Priority Class Noted POA    UTI (urinary tract infection) Medium  5/21/2018 Yes      Non-Hospital Problems as of 5/27/2018              Priority Class Noted    Right knee DJD Medium  9/19/2014    Alcohol abuse Medium  9/24/2014    Alcoholic cirrhosis of liver (H) Medium  9/24/2014    Chronic kidney disease, stage III (moderate) Medium  9/24/2014    Physical deconditioning Medium  9/24/2014    CAD, MI in 2007 -- S/P CABG x 3 in 2007 Medium  5/24/2017    Prostate cancer -- S/P Radiative Seed implants in 2000 (no problems since) Medium  5/24/2017    Antiphospholipid Jina Syn, Hypercoag (DVT, PE) -- has IVC filt and Warfarin Medium  5/24/2017    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 (H) Medium  5/24/2017    Thoracic aortic aneurysm without rupture (H) Medium  5/24/2017    Chronic congestive heart failure, unspecified congestive heart failure type (H) Medium  6/30/2017    Abscess of Left BKA -- S/P I&D 12/1/17, MSSA Medium  11/29/2017    Paroxysmal a-fib (H) Medium  11/29/2017    Cellulitis and abscess of leg Medium  11/29/2017    Thrombocytopenia -- suspect related to alcohol use Medium  11/29/2017    Abscess Medium  12/1/2017    Infection of left below knee amputation (H) Medium  12/4/2017    Lymphedema Medium  12/12/2017    Anemia due to blood loss, acute Medium  12/12/2017     Yeast infection of the skin Medium  12/14/2017    Pressure ulcer, stage 2 Medium  12/14/2017    Hyperkalemia Medium  12/17/2017    Confusion Medium  12/17/2017    Fall Medium  1/29/2018    S/P craniotomy Medium  4/7/2018    Subdural hematoma (H) Medium  4/7/2018    Benign essential hypertension Medium  4/27/2018    Alcohol dependence in remission (H) Medium  5/18/2018      Immunizations     Name Date      Influenza (High Dose) 3 valent vaccine 09/25/17     Influenza (High Dose) 3 valent vaccine 09/01/16     Influenza (High Dose) 3 valent vaccine 09/20/14     Pneumo Conj 13-V (2010&after) 06/21/16     Pneumococcal 23 valent 09/22/14     TDAP Vaccine (Adacel) 10/06/16          END      ASSESSMENT     Discharge Profile Flowsheet     EXPECTED DISCHARGE     Passing flatus  yes 05/26/18 1948    Expected Discharge Date  05/27/18 05/27/18 1215   COMMUNICATION ASSESSMENT      DISCHARGE NEEDS ASSESSMENT     Patient's communication style  spoken language (English or Bilingual) 05/21/18 1709    Concerns To Be Addressed  all concerns addressed in this encounter 05/27/18 1215   FINAL RESOURCES      Patient/family verbalizes understanding of discharge plan recommendations?  Yes 05/27/18 1215   Resources List  Home Care 02/01/18 1552    Medical Team notified of plan?  yes 05/27/18 1215   PAS Number  [id number] 05/27/18 1215    Anticipated Changes Related to Illness  inability to care for self 05/25/18 1616   SKIN      Equipment Currently Used at Home  grab bar;cane, straight;raised toilet;walker, rolling;wheelchair, manual;shower chair 04/13/18 1234   Inspection of bony prominences  Full 05/27/18 1002    Current Discharge Risk  physical impairment 05/25/18 1616   Inspection under devices  Full 05/27/18 1002    # of Referrals Placed by CTS  Post Acute Facilities 05/25/18 1616   Skin WDL  ex 05/27/18 1002    Does Patient Need a Referral for Clinic CC  Yes 04/12/18 1009   Skin Integrity  drain/device(s) 05/27/18 1002    Primary Care  "[facility] Name  [facility] 04/12/18 1009   Additional Documentation  -- ([facility]) 05/24/18 1856    Primary Care MD Name  Antony 04/12/18 1009   Full except areas not inspected   Buttock, left;Buttock, right 05/24/18 2212    Equipment Used at Home  -- (w/c, SeC and WW in [place]- has reacher) 09/21/14 1005   SAFETY      GASTROINTESTINAL (ADULT,PEDIATRIC,OB)     Safety WDL  WDL 05/27/18 1002    GI WDL  WDL 05/27/18 1002   Safety Factors  call light in reach;upper side rails raised x 2 05/26/18 1948    All Quadrants Bowel Sounds  audible and normoactive 05/26/18 2133   All Alarms  alarm(s) activated and audible 05/27/18 1002    Last Bowel Movement  05/23/18 05/25/18 1934                      Assessment WDL (Within Defined Limits) Definitions           Safety WDL     Effective: 09/28/15    Row Information: <b>WDL Definition:</b> Bed in low position, wheels locked; call light in reach; upper side rails up x 2; ID band on<br> <font color=\"gray\"><i>Item=AS safety wdl>>List=AS safety wdl>>Version=F14</i></font>      Skin WDL     Effective: 09/28/15    Row Information: <b>WDL Definition:</b> Warm; dry; intact; elastic; without discoloration; pressure points without redness<br> <font color=\"gray\"><i>Item=AS skin wdl>>List=AS skin wdl>>Version=F14</i></font>      Vitals     Vital Signs Flowsheet     VITAL SIGNS     ANALGESIA SIDE EFFECTS MONITORING      Temp  96.9  F (36.1  C) 05/27/18 0904   Side Effects Monitoring: Respiratory Quality  R 05/27/18 1004    Temp src  Oral 05/27/18 0901   Side Effects Monitoring: Respiratory Depth  N 05/27/18 1004    Resp  16 05/27/18 0901   Side Effects Monitoring: Sedation Level  1 05/27/18 1004    Heart Rate  59 05/27/18 0904   HEIGHT AND WEIGHT      Pulse/Heart Rate Source  Monitor 05/26/18 1359   Height  1.854 m (6' 1\") 05/21/18 1717    BP  115/61 05/27/18 0904   Weight  105.1 kg (231 lb 12 oz) 05/27/18 0613    BP Location  Right arm 05/27/18 0901   Weight Method  Bed scale 05/27/18 0613    OXYGEN " THERAPY     Estimated Weight (From ED)  95.3 kg (210 lb) 05/21/18 1717    SpO2  94 % 05/27/18 0904   POSITIONING      O2 Device  None (Room air) 05/27/18 0901   Body Position  independently positioning;weight shift assistance provided 05/27/18 0901    Oxygen Delivery  2 LPM 05/24/18 0937   Head of Bed (HOB)  HOB at 60 degrees 05/26/18 1948    PAIN/COMFORT     Positioning/Transfer Devices  pillows;in use 05/27/18 0901    Patient Currently in Pain  yes 05/27/18 1004   DAILY CARE      0-10 Pain Scale  4 05/27/18 1004   Activity Management  activity encouraged 05/27/18 0901    Pain Location  Penis 05/27/18 0947   Activity Assistance Provided  assistance, 2 people 05/27/18 0901    Pain Orientation  Mid 05/25/18 1031   Assistive Device Utilized  gait belt;walker 05/27/18 0901    Pain Intervention(s)  Medication (See eMAR) 05/27/18 0947   ECG      Response to Interventions  Decrease in pain 05/27/18 1004   ECG Rhythm  Normal sinus rhythm 05/22/18 1945            Patient Lines/Drains/Airways Status    Active LINES/DRAINS/AIRWAYS     Name: Placement date: Placement time: Site: Days: Last dressing change:    Open Drain Left Other (Comment) 19 [place] 12/08/17   1845   Other (Comment)   169     Nephrostomy 1 Left Flexima Nephrostomy Catheter Lot [id number] 05/24/18   0910    3     Nephrostomy 2 Right Percuflex Ureteral stent Lot # [id number] 05/24/18   0923    3     Peripheral IV 05/23/18 Right Hand 05/23/18   2000   Hand   3     Wound 11/29/17 Left Elbow Abrasion(s) 11/29/17   2030   Elbow   178     Wound 11/29/17 Right Foot Abrasion(s) 11/29/17   2030   Foot   178     Wound 01/29/18 Head 01/29/18   0001   Head   118     Wound 04/12/18 Right Scalp Surgical Incision sites with staples  04/12/18   1900   Scalp   44     Wound 04/12/18 Left Other (Comment) Other (comment) BKA stump: Pink blanchable, pink non blanchable and bruised with small open area 04/12/18   1900   Other (Comment)   44     Incision/Surgical Site 12/08/17 Left Leg  12/08/17   1913    169     Incision/Surgical Site 04/07/18 Right Head 04/07/18   1631    50             Patient Lines/Drains/Airways Status    Active PICC/CVC     None            Intake/Output Detail Report     Date Intake     Output Net    Shift P.O. I.V. IV Piggyback Total Urine Total       Day 05/26/18 0700 - 05/26/18 1459 600 -- -- 600 430 430 170    Bruna 05/26/18 1500 - 05/26/18 2259 -- 3 -- 3 350 350 -347    Noc 05/26/18 2300 - 05/27/18 0659 120 -- -- 120 230 230 -110    Day 05/27/18 0700 - 05/27/18 1459 -- -- -- -- 820 820 -820    Bruna 05/27/18 1500 - 05/27/18 2259 -- -- -- -- -- -- 0      Last Void/BM       Most Recent Value    Urine Occurrence 1 at 05/22/2018 1840    Stool Occurrence 1 at 05/24/2018 1400      Case Management/Discharge Planning     Case Management/Discharge Planning Flowsheet     REFERRAL INFORMATION     EXPECTED DISCHARGE      Admission Type  inpatient 05/25/18 1616   Expected Discharge Date  05/27/18 05/27/18 1215    Arrived From  home or self-care 05/25/18 1616   ASSESSMENT/CONCERNS TO BE ADDRESSED      # of Referrals Placed by CTS  Post Acute Facilities 05/25/18 1616   Concerns To Be Addressed  all concerns addressed in this encounter 05/27/18 1215    Reason For Consult  discharge planning 05/25/18 1616   DISCHARGE PLANNING      Record Reviewed  medical record 05/25/18 1616   Patient/family verbalizes understanding of discharge plan recommendations?  Yes 05/27/18 1215    CTS Assigned to Case  Christina shabazzTREMAYNE Buckley 05/25/18 1616   Medical Team notified of plan?  yes 05/27/18 1215    Primary Care [facility] Name  [facility] 04/12/18 1009   Anticipated Changes Related to Illness  inability to care for self 05/25/18 1616    Primary Care MD Name  Antony 04/12/18 1009   Current Discharge Risk  physical impairment 05/25/18 1616    LIVING ENVIRONMENT     Does Patient Need a Referral for Clinic CC  Yes 04/12/18 1009    Lives With  spouse 05/25/18 1616   Equipment Used at Home  -- (w/c, SeC and WW in [place]-  has reacher) 09/21/14 1005    Living Arrangements  apartment 05/25/18 1616   FINAL RESOURCES      Provides Primary Care For  no one 05/25/18 1616   Equipment Currently Used at Home  grab bar;cane, straight;raised toilet;walker, rolling;wheelchair, manual;shower chair 04/13/18 1234    Quality Of Family Relationships  supportive;involved 05/25/18 1616   Resources List  Home Care 02/01/18 1552    Able to Return to Prior Living Arrangements  no 05/25/18 1616   PAS Number  [id number] 05/27/18 1215    HOME SAFETY     ABUSE RISK SCREEN      Patient Feels Safe Living in Home?  yes 05/25/18 1616   QUESTION TO PATIENT:  Has a member of your family or a partner(now or in the past) intimidated, hurt, manipulated, or controlled you in any way?  no 05/21/18 1711    ASSESSMENT OF FAMILY/SOCIAL SUPPORT     QUESTION TO PATIENT: Do you feel safe going back to the place where you are living?  yes 05/21/18 1711    Marital Status   05/25/18 1616   OBSERVATION: Is there reason to believe there has been maltreatment of a vulnerable adult (ie. Physical/Sexual/Emotional abuse, self neglect, lack of adequate food, shelter, medical care, or financial exploitation)?  no 05/21/18 1711    Who is your support system?  Wife;Children 05/25/18 1616   OTHER      Description of Support System  Supportive;Involved 05/25/18 1616   Are you depressed or being treated for depression?  No 05/21/18 2157    Support Assessment  Adequate family and caregiver support 05/25/18 1616   HOMICIDE RISK      COPING/STRESS     Feels Like Hurting Others  no 05/21/18 1711    Major Change/Loss/Stressor  denies 05/21/18 2157

## 2018-05-21 NOTE — IP AVS SNAPSHOT
` `           [place] [address] ONCOLOGY: [phone]                 INTERAGENCY TRANSFER FORM - NOTES (H&P, Discharge Summary, Consults, Procedures, Therapies)   2018                    Hospital Admission Date: 2018  ANTONIO RAMIREZ   : 1945  Sex: Male        Patient PCP Information     Provider PCP Type    Main Hardy MD General         History & Physicals      H&P by Solomon Franklin MD at 2018  8:01 PM     Author:  Solomon Franklin MD Service:  Hospitalist Author Type:  Physician    Filed:  2018 12:59 AM Date of Service:  2018  8:01 PM Creation Time:  2018  8:01 PM    Status:  Signed :  Solomon Franklin MD (Physician)         [facility]    History and Physical  Hospitalist       Date of Admission:  2018    Assessment & Plan   Antonio Ramirez is a 73 year old male with[PB1.1] complex[PB1.2] past history[PB1.1] recent traumatic subdural hematoma (18), alcoholic cirrhosis, chronic kidney disease, hypertension, CAD, atrial fibrillation, CHF among other chronic medical issues[PB1.2] who presents with[PB1.1] generalized weakness and found to have UTI.[PB1.2]    UTI with possible early sepsis:[PB1.1] Presents with fever and a lactate of 2.2 but no other SIRS criteria.  Abnormal UA with pyuria and leukocyte esterase.  Received zosyn empirically in ED due to sepsis.[PB1.2]  - switch to ceftriaxone  - follow blood and urine cultures  -  ml/hr; monitor fluid status closely given history of CKD, CHF and cirrhosis[PB1.1]    Alcoholic cirrhosis: Continues to drink daily.  Bilirubin mildly elevated LFTs otherwise within normal limits.  - low salt diet    CAD status post CABG ×3 in   Atrial fibrillation  Hypertension  TTE in 2018 shows ejection fraction 40-45%, regional wall motion abnormalities, moderately dilated RV with decreased RV systolic function, moderate MR, mild AS and pulmonary hypertension.  - Continue carvedilol with hold  parameters, hold diuretic in favor of IV fluids    Chronic kidney disease stage II: Baseline creatinine 0.9-1.0.  Admission Cr 1.22.  - BMP in AM    Chronic anemia and from cytopenia: Suspect due to cirrhosis.  Baseline hemoglobin approximately 10-11 g/dL, baseline plt 130-150.  - hgb stable, platelets slightly below baseline, monitor    Traumatic subdural hematoma:  Healing well, he has been cleared to restart anticoagulation it is awaiting clearance from his outpatient oncologist.    History of DVT PE status post IVC filter, anticardiolipin antibody:  Anticoagulation as noted above.[PB1.2]      DVT Prophylaxis:[PB1.1] Pneumatic Compression Devices[PB1.2]  Code Status:[PB1.1] Full Code[PB1.2]    # Pain Assessment:  Current Pain Score 4/20/2018   Patient currently in pain? sleeping: patient not able to self report   Pain score (0-10) -   Pain location -   Pain descriptors -   CPOT pain score -[PB1.1]   Antonio mendez pain level was assessed and he currently denies pain.[PB1.2]          Disposition: Expected discharge in[PB1.1] 3[PB1.2] days once[PB1.1] sepsis resolved and culture results finalized with antibiotic plan in place[PB1.2].    Solomon Franklin    Primary Care Physician   Main Hardy    Chief Complaint[PB1.1]   weakness[PB1.2]    History is obtained from[PB1.1] the patient and chart reivew[PB1.2]    History of Present Illness   Antonio Ramirez is a 73 year old male who presents with[PB1.1] generalized weakness.  Patient reports he was unable to stand up today secondary to weakness which has been progressive since Sunday.  He denies any fevers or chills, nausea, lightheadedness, shortness of breath, cough, rash or sores, headache, sore throat or dysuria.  He does endorse some urinary urgency but denies any frequency.  He denies any chest pain or pressure or palpitations.  His remainder review of systems is negative.[PB1.2]    Past Medical History[PB1.1]    Traumatic subdural hematoma  Alcoholic cirrhosis  Chronic  "thrombocytopenia  Chronic anemia  Chronic kidney disease stage II  Prostate cancer status post radiation seed placement  History of DVT/PE status post IVC filter  Monoclonal gammopathy  Mitral regurgitation  Aortic stenosis  B-cell lymphoproliferative disorder  Hypertension  CAD status post CABG ×3  GERD  Anticardiolipin antibody  Atrial fibrillation  Thoracic aortic aneurysm  Congestive heart failure[PB1.2]    Past Surgical History[PB1.1]   Left lower extremity amputation, complicated by abscess  Right total hip arthroplasty  Right total knee arthroplasty  CABG in 2007  Cataract surgery  Craniotomy cholecystectomy  Appendectomy[PB1.2]    Prior to Admission Medications   Prior to Admission Medications   Prescriptions Last Dose Informant Patient Reported? Taking?   ASPIRIN NOT PRESCRIBED (INTENTIONAL)   Yes No   Sig: Please choose reason not prescribed, below   GABAPENTIN PO   Yes No   Sig: Take 600 mg by mouth 2 times daily   carvedilol (COREG) 3.125 MG tablet   Yes No   Sig: Take 1 tablet (3.125 mg) by mouth 2 times daily (with meals)   fluticasone (FLONASE) 50 MCG/ACT spray   Yes No   Sig: Spray 2 sprays into both nostrils daily   levETIRAcetam (KEPPRA) 500 MG tablet   No No   Sig: Take 1 tablet (500 mg) by mouth 2 times daily   torsemide (DEMADEX) 20 MG tablet   Yes No   Sig: Take 20 mg by mouth daily      Facility-Administered Medications: None     Allergies   Allergies   Allergen Reactions     Hmg-Coa-R Inhibitors Other (See Comments)     Rhabdomyolysis occurred within a couple days     Ciprofloxacin Itching     Severe itching \"like ants were crawling\"       Social History   I have personally reviewed the social history with the patient showing[PB1.1] he drinks 4-5 ounces of alcohol nightly.  He denies any tobacco or illicit drug use.[PB1.2].    Family History[PB1.1]   Denies any significant family history.[PB1.2]    Review of Systems   The 10 point Review of Systems is negative other than noted in the HPI or " here.     Physical Exam   Temp: 102.7  F (39.3  C) Temp src: Oral BP: 134/73   Heart Rate: 92 Resp: 16 SpO2: 93 %      Vital Signs with Ranges  Temp:  [102.7  F (39.3  C)] 102.7  F (39.3  C)  Heart Rate:  [92] 92  Resp:  [16] 16  BP: (134)/(73) 134/73  SpO2:  [93 %] 93 %  0 lbs 0 oz    Constitutional: well developed, well nourished[PB1.1] male[PB1.2] in no acute distress  Eyes: pupils equal, round and reactive to light, no icterus  HEENT: head normocephalic, atraumatic, mucous membranes moist, no cervical lymphadenopathy  Respiratory: lungs clear to auscultation bilaterally, no crackles or wheezes, no tachypnea  Cardiovascular: regular rate and rhythm, normal S1/S2, no murmur, rubs or gallops  GI: abdomen soft, non-tender, non-distended, normal bowel sounds, no hepatosplenomegaly  Lymph/Hematologic: No peripheral edema  Skin: No rash or bruising  Musculoskeletal:[PB1.1] left lower extremity amputation, currently wearing prothesis[PB1.2]  Neurologic: alert and appropriate, cranial nerves grossly intact  Psychiatric: normal affect    Data   Data reviewed today:  I personally reviewed[PB1.1] the chest x-ray image(s) showing mild right lower lobe opacity[PB1.2].    Recent Labs  Lab 05/21/18  1719 05/18/18  1641   WBC 7.0 4.4   HGB 11.0* 10.6*   MCV 83 86   * 168    140   POTASSIUM 4.0 4.3   CHLORIDE 104 107   CO2 25 28   BUN 19 16   CR 1.22 1.00   ANIONGAP 9 5   BENNIE 9.0 9.7   * 89   ALBUMIN 3.2*  --    PROTTOTAL 7.5  --    BILITOTAL 1.7*  --    ALKPHOS 105  --    ALT 12  --    AST 32  --    TROPI 0.027  --        Recent Results (from the past 24 hour(s))   Chest XR,  PA & LAT    Narrative    CHEST TWO VIEWS   5/21/2018 6:05 PM     HISTORY: Fever.    COMPARISON: 4/7/2018.    FINDINGS: Blunting of the right costophrenic angle and hazy opacity in  the posterior aspect of the lower right lung, best visualized on the  lateral projection image. The lungs are otherwise clear. Borderline  cardiomegaly.  Atherosclerotic calcification in the thoracic aorta.  Prior median sternotomy.      Impression    IMPRESSION:   1. Blunting of the right costophrenic angle, suggestive of a small  right pleural effusion.  2. A small hazy opacity in the posterior aspect of the lower right  lung is nonspecific, but pneumonia is possible. A follow-up chest  radiograph would be useful in one month to confirm resolution of this  finding.[PB1.1]          Revision History        User Key Date/Time User Provider Type Action    > PB1.2 5/22/2018 12:59 AM Solomon Franklin MD Physician Sign     PB1.1 5/21/2018  8:01 PM Solomon Franklin MD Physician                   Discharge Summaries     No notes of this type exist for this encounter.         Consult Notes      Consults by Sol Triplett RN at 5/25/2018  3:34 PM     Author:  Sol Triplett RN Service:  Ancillary, Case Management Author Type:      Filed:  5/25/2018  4:44 PM Date of Service:  5/25/2018  3:34 PM Creation Time:  5/25/2018  3:34 PM    Status:  Addendum :  Sol Triplett RN ()     Consult Orders:    1. Care Coordinator IP Consult [[id number]] ordered by Alejandra Castillo MD at 05/25/18 1532                Care Transition Initial Assessment - RN        Met with: Patient and wife.  DATA[PB1.1]   Active Problems:    UTI (urinary tract infection)[PB1.2]       Cognitive Status: awake, alert and oriented.        Contact information and PCP information verified: Yes  Lives With: spouse  Living Arrangements: apartment                 Insurance concerns: Insurance Plan  ASSESSMENT  Patient currently receives the following services:  Prior to coming into the hospital, he was open to [facility]. He also has a lot of cormobidities that could delay his recovery.          Identified issues/concerns regarding health management: Getting back to his baseline with mobilization.[PB1.1] Currently his prosthetic leg does not fit properly secondary to his leg  being too swollen.[PB1.3]     PLAN  Financial costs for the patient include; transportation (wife hasn't decided if family will transport) .  Patient given options and choices for discharge: yes, Referral sent to ARU and to 3 TCUs ([facility], [facility], [facility]) .  Patient/family is agreeable to the plan?  Yes  Patient anticipates discharging to either ARU or a TCU.        Patient anticipates needs for home equipment:[PB1.1] He may need a walker.[PB1.3]  Plan/Disposition: Not finalized yet. Waiting to hear first if ARU will accept patient with the understanding that patient will need to have his bilateral nephrostomy tubes removed on Tuesday, May 29, 2018.[PB1.1] If he does not get accepted at ARU, then TCUs will be persued.[PB1.4]  Appointments:[PB1.1] Tuesday May 29, 2018 he has appointment at [facility] IR to have his Bilateral Nephrostomy Tubes removed.[PB1.3]     Care  (CTS) will continue to follow as needed.[PB1.1]                   Revision History        User Key Date/Time User Provider Type Action    > PB1.4 5/25/2018  4:44 PM Sol Triplett RN Case Manager Addend     PB1.3 5/25/2018  4:14 PM Sol Triplett RN Case Manager Addend     PB1.2 5/25/2018  3:53 PM Sol Triplett RN Case Manager Sign     PB1.1 5/25/2018  3:34 PM Sol Triplett RN Case Manager             Consults by Kaylynn Caballero PA-C at 5/23/2018  1:31 PM     Author:  Kaylynn Caballero PA-C Service:  Urology Author Type:  Physician Assistant - CARA    Filed:  5/23/2018  1:31 PM Date of Service:  5/23/2018  1:31 PM Creation Time:  5/23/2018  1:15 PM    Status:  Attested :  Kaylynn Caballero PA-C (Physician Assistant - C)    Cosigner:  Dimitry Bello MD at 5/23/2018  2:17 PM         Consult Orders:    1. Urology IP Consult: ureteric stones, bilateral hydronephrosis, sepsis. (discussed with Dr Fallen); Consultant may enter orders: Yes; Patient to be seen: Routine - within 24 hours; Requested Clinic/Group:  [facility] [[id number]] ordered by Alejandra Castillo MD at 05/22/18 2138           Attestation signed by Dimitry Bello MD at 5/23/2018  2:17 PM        Physician Attestation   I agree with the information in this note.    Dimitry Bello                               [facility]    Urology Consultation     Date of Admission:  5/21/2018    Assessment & Plan   Antonio Ramirez is a 73 year old male who was admitted on 5/21/2018. I was asked to see the patient for bilateral obstructing ureteral stones with urosepsis, s/p bilateral PCN placements.    Plan: Continue PCNs for now, IR to evaluate patency given low output. Plan for possible antegrade stent placement tomorrow with IR and removal of PCNs.   -Continue abx, adjust with final cultures  -Follow up with urology in approx 2wks for definitive stone and ureteral stent management.     Kaylynn Caballero PA-C  [facility]  [address] [place] [id number]  [phone]  [email or web address]  Text Page (7am to 5pm)    Code Status    Full Code    Reason for Consult   Reason for consult: I was asked by Dr. Castillo to evaluate this patient for bilateral obstructing ureteral stones with urosepsis.    Primary Care Physician   Main Hardy    Chief Complaint   Fevers, weakness    History is obtained from the patient    History of Present Illness   Antonio Ramirez is a 73 year old male who was admitted on 5/21 with generalized weakness and fevers. UA was grossly positive for UTI on admission and he was initiated on abx and IV fluids. Kidney fxn was elevated at 1.22 on admission but continued to rise, up to 1.85 yesterday afternoon/evening along with persistent fevers. A CT scan was then ordered to evaluate for possible obstruction and he was found to have bilateral obstructing ureteral stones (1.1cm and 7mm left UPJ, two 8.3mm right mid-ureter) with mild-moderate bilateral hydronephrosis. Dr Hunter was called  yesterday evening and recommended bilateral PCN placement with IR.     Patient now resting comfortably, bilateral PCNs draining bloody urine. Denies any flank pain or dysuria throughout his hospital stay. Upon further questioning, patient notes a known hx of bilateral renal stones for which he has followed with Dr. Bello in the past although it is unclear how many years it has been since he was last evaluated in the urology office. He also notes a lithotripsy procedure in the 80's, prior UTIs and 1-2 episodes of gross hematuria within the past year for which he has attributed to his stone disease. Currently denies flank or suprapubic discomfort.     AVSS, tmax 102.7  Creat 1.85-->1.75  Hb 10.0  WBC 10.6  UCx >100K gram neg rods  BCx preliminarily positive for klebsiella pneumoniae     Past Medical History   I have reviewed this patient's medical history and updated it with pertinent information if needed.[MG1.1]   Past Medical History:   Diagnosis Date     Alcoholism (H)      Amputated left leg (H)      Anemia      Anticardiolipin antibody syndrome (H)      Antiplatelet or antithrombotic long-term use      Aortic stenosis      Arthritis      Balance problem      Cardiomyopathy (H)      Coagulation disorder (H)     cardiolipinantibody syndrome     Coronary artery disease      Gastro-oesophageal reflux disease      Heart attack 2007     Hiatal hernia      Hypercholesterolemia      Hypertension      Left foot drop      Liver disease     alcoholic liver disease, alcoholic cirrohsosis, portal hypertension     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)     ?When diagnosed, patient not aware of this     Moderate mitral regurgitation      Monoclonal gammopathy      Neuropathy      Noninfectious ileitis     diverticulitis of colon     PE (pulmonary embolism) 2006     Prostate cancer (H) 2000    Treated with radiation (seed implants in 2000) -- no problems since     Renal disease     kidney stones     Syncope       Thoracic aortic aneurysm, without rupture      Thrombocytopenia (H)      Thrombosis of leg      Urethral stricture[MG1.2]        Past Surgical History   I have reviewed this patient's surgical history and updated it with pertinent information if needed.[MG1.1]  Past Surgical History:   Procedure Laterality Date     AMPUTATE LEG BELOW KNEE Left 04/2014    related to gangrene, started as foot ulcer related to neuropathy     AMPUTATE LEG BELOW KNEE Left 12/4/2017    Procedure: AMPUTATE LEG BELOW KNEE;  Exploration of Left Leg Below Knee Amputation Stump with Ligation of Bleeders.;  Surgeon: Leandro Arreola MD;  Location:  OR     APPENDECTOMY       APPLY WOUND VAC Left 12/6/2017    Procedure: APPLY WOUND VAC;;  Surgeon: Saima Howard MD;  Location:  SD     ARTHROPLASTY KNEE Right 9/19/2014    Procedure: ARTHROPLASTY KNEE;  Surgeon: Saima Howard MD;  Location:  OR     CARDIAC SURGERY      CABG     CHOLECYSTECTOMY       COLONOSCOPY       CRANIOTOMY Right 4/7/2018    Procedure: CRANIOTOMY;  Right Craniotomy For Subdural Hematoma;  Surgeon: Jono Issa MD;  Location:  OR     CYSTOSCOPY, DILATE URETHRA, COMBINED N/A 10/12/2016    Procedure: COMBINED CYSTOSCOPY, DILATE URETHRA;  Surgeon: Dimitry Bello MD;  Location:  OR     ENDOSCOPIC STRIPPING VEIN(S)       esophagogastroduodenoscopy       EYE SURGERY      cataracts     GENITOURINARY SURGERY      Prostate Seen Implants     HERNIA REPAIR      Umbilical     INCISION AND DRAINAGE LOWER EXTREMITY, COMBINED Left 12/1/2017    Procedure: COMBINED INCISION AND DRAINAGE LOWER EXTREMITY;  INCISION AND DRAINAGE OF LEFT BELOW KNEE AMPUTATION ABSCESS, WOUND VAC PLACEMENT;  Surgeon: Saima Howard MD;  Location:  OR     IR IVC FILTER PLACEMENT       IRRIGATION AND DEBRIDEMENT LOWER EXTREMITY, COMBINED Left 12/6/2017    Procedure: COMBINED IRRIGATION AND DEBRIDEMENT LOWER EXTREMITY;  IRRIGATION AND DEBRIDEMENT OF LEFT BELOW KNEE AMPUTATION  "SITE WITH WOUND VAC REPLACEMENT;  Surgeon: Saima Howard MD;  Location:  SD     IRRIGATION AND DEBRIDEMENT LOWER EXTREMITY, COMBINED Left 12/8/2017    Procedure: COMBINED IRRIGATION AND DEBRIDEMENT LOWER EXTREMITY;  IRRIGATION AND DEBRIDEMENT OF LEFT BELOW THE KNEE AMPUTATION AND SHORTENING OF THE TIBIA WITH WOUND CLOSURE;  Surgeon: Saima Howard MD;  Location:  OR     ORTHOPEDIC SURGERY      (R) knee scope     ORTHOPEDIC SURGERY      total hip      ORTHOPEDIC SURGERY      elbow surgery[MG1.2]       Prior to Admission Medications   Prior to Admission Medications   Prescriptions Last Dose Informant Patient Reported? Taking?   ASPIRIN NOT PRESCRIBED (INTENTIONAL)   Yes No   Sig: Please choose reason not prescribed, below   GABAPENTIN PO 5/20/2018 at PM Self Yes Yes   Sig: Take 600 mg by mouth 2 times daily   HYDROMORPHONE HCL PO Past Week at Unknown time Self Yes Yes   Sig: Take 2-4 mg by mouth every 4 hours as needed for moderate to severe pain   carvedilol (COREG) 3.125 MG tablet 5/20/2018 at PM Self Yes Yes   Sig: Take 1 tablet (3.125 mg) by mouth 2 times daily (with meals)   fluticasone (FLONASE) 50 MCG/ACT spray 5/20/2018 at PM Self Yes Yes   Sig: Spray 2 sprays into both nostrils daily   torsemide (DEMADEX) 20 MG tablet 5/20/2018 at AM Self Yes Yes   Sig: Take 20 mg by mouth daily      Facility-Administered Medications: None     Allergies   Allergies   Allergen Reactions     Hmg-Coa-R Inhibitors Other (See Comments)     Rhabdomyolysis occurred within a couple days     Ciprofloxacin Itching     Severe itching \"like ants were crawling\"       Social History   I have reviewed this patient's social history and updated it with pertinent information if needed. Antonio Ramirez[MG1.1]  reports that he has never smoked. He has never used smokeless tobacco. He reports that he drinks alcohol. He reports that he does not use illicit drugs.[MG1.2]    Family History   I have reviewed this patient's family history " and updated it with pertinent information if needed.[MG1.1]   Family History   Problem Relation Age of Onset     Lung Cancer Mother      Heart Failure Father       age 87[MG1.2]       Review of Systems   The 10 point Review of Systems is negative other than noted in the HPI or here.     Physical Exam   Temp: 98.3  F (36.8  C) Temp src: Axillary BP: 92/65   Heart Rate: 91 Resp: 16 SpO2: 94 % O2 Device: None (Room air)    Vital Signs with Ranges  Temp:  [95.6  F (35.3  C)-98.3  F (36.8  C)] 98.3  F (36.8  C)  Heart Rate:  [55-93] 91  Resp:  [8-26] 16  BP: ()/(54-87) 92/65  SpO2:  [91 %-100 %] 94 %  218 lbs 6.4 oz    Constitutional: Sitting up in bed, NAD, wife at bedside   Eyes: no icterus  ENT: normocephalic, atraumatic   Respiratory: breathing unlabored   Cardiovascular: chest wall symmetric   GI: soft, NT, ND. No CVAT  Lymph/Hematologic: no pedal edema  Genitourinary: bilateral PCNs with bloody output  Skin: well perfused   Neurologic: no focal deficits  Neuropsychiatric: A&Ox3    Data[MG1.1]   Results for orders placed or performed during the hospital encounter of 18 (from the past 24 hour(s))   Basic metabolic panel   Result Value Ref Range    Sodium 141 133 - 144 mmol/L    Potassium 4.1 3.4 - 5.3 mmol/L    Chloride 112 (H) 94 - 109 mmol/L    Carbon Dioxide 23 20 - 32 mmol/L    Anion Gap 6 3 - 14 mmol/L    Glucose 129 (H) 70 - 99 mg/dL    Urea Nitrogen 26 7 - 30 mg/dL    Creatinine 1.82 (H) 0.66 - 1.25 mg/dL    GFR Estimate 36 (L) >60 mL/min/1.7m2    GFR Estimate If Black 44 (L) >60 mL/min/1.7m2    Calcium 7.7 (L) 8.5 - 10.1 mg/dL   CT Abdomen Pelvis w/o Contrast    Narrative    CT ABDOMEN AND PELVIS WITHOUT CONTRAST 2018 4:02 PM     HISTORY: UTI, sepsis. Worsened Creatinine.      COMPARISON: None.    TECHNIQUE: Axial CT images of the abdomen and pelvis from the  diaphragm to the symphysis pubis were acquired without IV contrast.  Radiation dose for this scan was reduced using automated  exposure  control, adjustment of the mA and/or kV according to patient size, or  iterative reconstruction technique.    FINDINGS: There is a stone which measures 1.1 cm in maximal axial  dimension and 1.7 cm in length in the proximal left ureter. Just above  this is a 7 mm stone on the left. There is mild left hydronephrosis.  In the distal right ureter there are two stones adjacent to one  another measuring 8 mm in maximal dimension. There is mild-moderate  hydronephrosis proximal to this on the right. There are approximately  eight nonobstructing stones in the left kidney measuring up to 8 mm.  No intrarenal stones are seen on the right. There is mild bilateral  perinephric fat stranding. Kidneys are normal in size and  configuration.  There is moderate diverticulosis without evidence for  diverticulitis. There are no dilated loops of small intestine or large  bowel to suggest ileus or obstruction. Minimal free fluid. No free  air. Somewhat lobular contour to the liver noted which is nonspecific.  Cholecystectomy changes. There are extensive atherosclerotic changes  of the visualized aorta and its branches. There is no evidence of  aortic aneurysm.  No splenomegaly.  No definite adrenal nodules.   Pancreas grossly unremarkable. The remainder of the visualized abdomen  is unremarkable on this noncontrast scan. Survey of the visualized  bony structures demonstrates no destructive bony lesions. Small right  pleural effusion with associated compressive atelectasis and/or  infiltrate. Minimal probable atelectasis associated with calcified  pleural plaque at the left base. There are extensive coronary vascular  calcifications and/or stents consistent with coronary artery disease.      Impression    IMPRESSION:   1. Stones measuring 1.1 and 0.7 cm in maximal axial dimension are seen  in the proximal left ureter. There is mild proximal hydronephrosis.  2. Two stones measuring 8 mm in maximal axial dimension are seen  in  the distal right ureter with mild-moderate proximal hydronephrosis.   Basic metabolic panel   Result Value Ref Range    Sodium 141 133 - 144 mmol/L    Potassium 4.2 3.4 - 5.3 mmol/L    Chloride 111 (H) 94 - 109 mmol/L    Carbon Dioxide 24 20 - 32 mmol/L    Anion Gap 6 3 - 14 mmol/L    Glucose 127 (H) 70 - 99 mg/dL    Urea Nitrogen 26 7 - 30 mg/dL    Creatinine 1.85 (H) 0.66 - 1.25 mg/dL    GFR Estimate 36 (L) >60 mL/min/1.7m2    GFR Estimate If Black 43 (L) >60 mL/min/1.7m2    Calcium 7.8 (L) 8.5 - 10.1 mg/dL   Fractional excretion of sodium   Result Value Ref Range    Creatinine Urine 234 mg/dL    Sodium Urine mmol/L 12 mmol/L    %FENA <0.1 %   IR Nephrostomy Tube Placement Bilateral    Narrative    PROCEDURE(S):  1. Left antegrade nephrostogram  2. Placement of 8French left percutaneous nephrostomy tube   3. Right antegrade nephrostogram  4. Placement of 8French right percutaneous nephrostomy tube    DATE OF PROCEDURE: 5/22/2018    MEDICATIONS: 15 mL 1% lidocaine SQ, Versed 3 mg IV, Fentanyl 150 mcg  IV, patient was receiving antibiotics on the floor.    CONTRAST: 30 mL Isovue 300 into the renal collecting system.    FLUOROSCOPY TIME: 5.9 minutes, (19 mGy).    COMPLICATIONS: None.    CLINICAL HISTORY/INDICATION: 73-year-old male with urosepsis and  obstructing ureteral stones.    PROCEDURES AND FINDINGS:  Following a discussion of the risks,  benefits, indications and alternatives to treatment, appropriate  informed consent was obtained.  The patient was brought to the  interventional radiology suite and placed on the table. The bilateral  flank were prepped and draped in usual sterile fashion. A time out was  performed per universal protocol policy to ensure correct patient,  site and procedure to be performed.     A preliminary ultrasound of the left flank was performed which  demonstrates no hydronephrosis. Echogenic shadowing stones are noted.   Ultrasound images were archived.  Then, under ultrasound  "guidance an  appropriate site was marked in the left lateral lower back region for  needle placement and this region was subsequently infiltrated with 1%  lidocaine for local anesthesia. Under direct ultrasound guidance, a  21-gauge needle was inserted into the upper pole of the calyx until  urine return was noted. A small amount of contrast was then injected,  which demonstrates no hydronephrosis or hydroureter. There is a  filling defect in the proximal ureter consistent with known stone.  Contrast flows easily down the ureter into the bladder.  A 0.018\" wire  was then advanced through the needle and coiled within the renal  pelvis. The needle was then exchanged for a coaxial Guillermo set, which  was advanced over the wire through the calyx into the renal pelvis.  The 0.018\" wire was placed to the side as a safety wire and a 0.035\"  guidewire was advanced into the renal pelvis. The tract was serially  dilated with final placement of a 8 Icelandic nephrostomy tube with the  tip coiled in the renal pelvis. Injection of contrast demonstrates  pigtail is centered within the renal pelvis.  The contrast was  aspirated and the tube was placed to gravity bag drainage. The  catheter was secured to the skin using a 2-0 Ethilon and a sterile  dressing was applied.      Attention was then turned to the right kidney. Preliminary ultrasound  of the right flank demonstrates mild hydronephrosis. An appropriate  site was marked in the right lower back region for needle placement  and this region was then infiltrated with 1% lidocaine. Under direct  ultrasound guidance, a 21-gauge needle was inserted into the lower  pole of the dilated calyx until urine return was noted.  A small  amount of contrast was then injected which demonstrates mild  hydronephrosis and hydroureter. Filling defect is seen in the distal  ureter consistent with known stone. Contrast flows easily down the  ureter into the bladder.  A 0.018\" wire was then advanced " "through the  needle and coiled within the renal pelvis. The needle was then  exchanged for a coaxial Guillermo set, which was advanced over the wire  through the calyx into the renal pelvis. The 0.018 wire was placed to  the side as a safety wire and a 0.035\" guidewire was advanced into the  renal pelvis. The tract was serially dilated with final placement of  an 8 Montenegrin nephrostomy tube with the tip coiled in the renal pelvis.  Injection of contrast demonstrates the pigtail centered within the  renal pelvis.  The contrast was aspirated and the tube was placed to  gravity bag drainage. The catheter was secured to the skin using a 2-0  Ethilon and a sterile dressing was applied.      Throughout the procedure, the patient was monitored by a radiology  nurse for cardiac rhythm and oxygen saturation which remained stable.  Total sedation time was 52 minutes The patient tolerated the procedure  well and left interventional radiology in stable condition.      Impression    IMPRESSION:  1. Successful placement of a 8 Montenegrin left percutaneous nephrostomy  tube. Left antegrade nephrostogram demonstrates no hydronephrosis or  hydroureter. The left collecting system is patent. Contrast flows  easily down the ureter into the bladder without evidence of  obstruction. Filling defect is seen in the proximal left ureter,  consistent with known stone.  2. Successful placement of a right Montenegrin right percutaneous  nephrostomy tube. Right antegrade nephrostogram demonstrates mild to  moderate hydronephrosis and hydroureter. The right collecting system  is patent. Contrast flows easily down the ureter into the bladder  without evidence of obstruction. Filling defect is seen in the distal  right ureter, consistent with known stone.    PRETTY JO DO   CBC with platelets differential   Result Value Ref Range    WBC 6.7 4.0 - 11.0 10e9/L    RBC Count 3.58 (L) 4.4 - 5.9 10e12/L    Hemoglobin 9.5 (L) 13.3 - 17.7 g/dL    Hematocrit 30.5 " (L) 40.0 - 53.0 %    MCV 85 78 - 100 fl    MCH 26.5 26.5 - 33.0 pg    MCHC 31.1 (L) 31.5 - 36.5 g/dL    RDW 19.2 (H) 10.0 - 15.0 %    Platelet Count 83 (L) 150 - 450 10e9/L    Diff Method Automated Method     % Neutrophils 82.1 %    % Lymphocytes 7.5 %    % Monocytes 9.1 %    % Eosinophils 0.4 %    % Basophils 0.6 %    % Immature Granulocytes 0.3 %    Nucleated RBCs 0 0 /100    Absolute Neutrophil 5.5 1.6 - 8.3 10e9/L    Absolute Lymphocytes 0.5 (L) 0.8 - 5.3 10e9/L    Absolute Monocytes 0.6 0.0 - 1.3 10e9/L    Absolute Eosinophils 0.0 0.0 - 0.7 10e9/L    Absolute Basophils 0.0 0.0 - 0.2 10e9/L    Abs Immature Granulocytes 0.0 0 - 0.4 10e9/L    Absolute Nucleated RBC 0.0    Basic metabolic panel   Result Value Ref Range    Sodium 139 133 - 144 mmol/L    Potassium 4.0 3.4 - 5.3 mmol/L    Chloride 109 94 - 109 mmol/L    Carbon Dioxide 23 20 - 32 mmol/L    Anion Gap 7 3 - 14 mmol/L    Glucose 99 70 - 99 mg/dL    Urea Nitrogen 31 (H) 7 - 30 mg/dL    Creatinine 1.75 (H) 0.66 - 1.25 mg/dL    GFR Estimate 38 (L) >60 mL/min/1.7m2    GFR Estimate If Black 46 (L) >60 mL/min/1.7m2    Calcium 7.6 (L) 8.5 - 10.1 mg/dL[MG1.3]                Revision History        User Key Date/Time User Provider Type Action    > MG1.3 5/23/2018  1:31 PM Kaylynn Caballero PA-C Physician Assistant - [facility] Sign     MG1.2 5/23/2018  1:28 PM Kaylynn Caballero PA-C Physician Assistant - C      MG1.1 5/23/2018  1:15 PM Kaylynn Caballero PA-C Physician Assistant - C                      Progress Notes - Physician (Notes from 05/24/18 through 05/27/18)      Progress Notes by Roxie Pinedo LSW at 5/27/2018 12:20 PM     Author:  Roxie Pinedo LSW Service:  (none) Author Type:      Filed:  5/27/2018  3:32 PM Date of Service:  5/27/2018 12:20 PM Creation Time:  5/27/2018 12:20 PM    Status:  Addendum :  Roxie Pinedo LSW ()         PAS-RR    D: Per DHS regulation, FAHEEM completed and submitted PAS-RR to [place]  "Board on Aging Direct Connect via the Senior LinkAge Line.  PAS-RR confirmation # is : [id number]    I: FAHEEM spoke with pt and they are aware a PAS-RR has been submitted.  FAHEEM reviewed with pt that they may be contacted for a follow up appointment within 10 days of hospital discharge if their SNF stay is < 30 days.  Contact information for Senior LinkAge Line was also provided.    A: pt  verbalized understanding.    P: Further questions may be directed to [facility] Line at #[phone], option #4 for PAS-RR staff.    SW:  D: FA[facility]EM verified a private room with [facility]. SW discussed private room fee $36/ day, pt was ok with this. SW discussed transportation. Pt would like a wheelchair ride setup, SW went over fee associated with wheelchair ride base was around $65 & every mile was about $6/ mile. Pt was ok with this. FAHEEM called [facility] & setup transport for 1715 by wheelchair. FAHEEM called [facility] & updated on time & transport. FAHEEM completed PAS. SW to fax orders when completed to [phone].  P: SW continue to follow.[BC1.1]     ADDENDUM    SW:  D: FAHEEM paged MD \"Please complete discharge orders to TCU. Bed confirmed pt will d/c @ 1715 via [facility] wheelchair. thank you!\" once orders compelted will fax to [facility].  P: FAHEEM will continue to follow.[BC1.2]   TREMAYNE Ghosh[BC1.1]    ADDENDUM    SW:  D: FAHEEM faxed orders to [facility] [phone].  P: Follow for d/c.[BC1.3]        Revision History        User Key Date/Time User Provider Type Action    > BC1.3 5/27/2018  3:32 PM Roxie Pinedo LSW  Addend     BC1.2 5/27/2018 12:31 PM Roxie Pinedo LSW  Addend     BC1.1 5/27/2018 12:23 PM Roxie Pinedo LSW  Sign            Progress Notes by Yoly Mohr RN at 5/27/2018  3:30 PM     Author:  Yoly Mohr RN Service:  Care Coordinator Author Type:      Filed:  5/27/2018  3:31 PM Date of Service:  5/27/2018  3:30 PM Creation Time:  5/27/2018  3:30 PM    Status:  Signed :  Onur" Yoly KLEIN RN ()         Care Coordination:    Sent handoff to PCP clinic due to elevated readmission risk score.    Yoly Mohr RN, BSN  [facility] Care Coordinator   Mobile Phone: [phone]    _________________________________________________________________  Transition Communication Hand-off for Care Transitions to Next Level of Care Provider    Name: Antonio Ramirez  : 1945  MRN #: [id number]  Primary Care Provider: [facility]    Primary Clinic: [facility] [address]    Reason for Hospitalization:  Hyperbilirubinemia [E80.6]  Thrombocytopenia (H) [D69.6]  Generalized weakness [R53.1]  Severe sepsis (H) [A41.9, R65.20]  Urinary tract infection without hematuria, site unspecified [N39.0]  Acute renal failure, unspecified acute renal failure type (H) [N17.9]  Admit Date/Time: 2018  5:09 PM   Discharge Date: 18  Payor Source: Payor: MEDICARE / Plan: MEDICARE / Product Type: Medicare /   Readmission Assessment Measure (MANSOOR) Risk Score/category:  ELEVATED  Reason for Communication Hand-off Referral: elevated readmission risk    Plan of Care Goals/Milestone Events:  Hx of recent traumatic subdural hematoma (18), alcoholic cirrhosis, CKD, hypertension, CAD, afib and CHF.  Admitted on 2018 with generalized weakness,   1. found UTI and klebsiella bacteremia. [facility]'d [facility] with 2 week course IV abx.    2. ANSELMO on CKDII due to obstruction (bilateral ureteric stones with hydronephrosis), neph tube placed .  Neph tubes to be pulled .      Discharge Plan:  Discharge Plan:       Most Recent Value    Concerns To Be Addressed all concerns addressed in this encounter       Concern for non-adherence with plan of care: No so long as has support of TCU  Discharge Needs Assessment:  Needs       Most Recent Value    Anticipated Changes Related to Illness inability to care for self    Current Discharge Risk physical impairment    # of Referrals Placed by CTS  Post Acute Facilities    [facility] Number [id number]        Follow-up specialty is recommended: Yes urology    Follow-up plan:  Future Appointments  Date Time Provider Department Center   5/29/2018 8:00 AM [facility]   5/29/2018 1:30 PM Silvia Goel, PT [facility]     Any outstanding tests or procedures:      Referrals     Future Labs/Procedures    Occupational Therapy Adult Consult     Comments:    Evaluate and treat as clinically indicated.    Reason:  Physical deconditioning    Physical Therapy Adult Consult     Comments:    Evaluate and treat as clinically indicated.    Reason:  Physical deconditioning          Key Recommendations:  Will need PCP followup following TCU stay, as well as urology given the stents.  Please continue to closely follow this patient in clinic once out of the TC U! Thank you,     Yoly Mohr RN, BSN  [facility] Care Coordinator   Mobile Phone: [phone]    AVS/Discharge Summary is the source of truth; this is a helpful guide for improved communication of patient story[LH1.1]               Revision History        User Key Date/Time User Provider Type Action    > LH1.1 5/27/2018  3:31 PM Yoly Mohr RN Case Manager Sign            Progress Notes by Yoly Mohr RN at 5/27/2018  2:48 PM     Author:  Yoly Mohr RN Service:  Care Coordinator Author Type:      Filed:  5/27/2018  2:50 PM Date of Service:  5/27/2018  2:48 PM Creation Time:  5/27/2018  2:48 PM    Status:  Addendum :  Yoly Mohr RN ()         Paged MD: TCU just called, they need the dc orders prior to 3pm due to pharmacy closing. Thanks & sorry![LH1.1]  + Callback received, MD will do the med rec now, and come to do the rest of the dc later.[LH1.2]     Revision History        User Key Date/Time User Provider Type Action    > LH1.2 5/27/2018  2:50 PM Yoly Mohr RN Case Manager Addend     LH1.1 5/27/2018  2:48 PM Yoly Mohr RN Case Manager Sign             Progress Notes by Jose Hunter MD at 5/27/2018 10:33 AM     Author:  Jose Hunter MD Service:  Urology Author Type:  Physician    Filed:  5/27/2018 10:39 AM Date of Service:  5/27/2018 10:33 AM Creation Time:  5/27/2018 10:33 AM    Status:  Signed :  Jose Hunter MD (Physician)         UROLOGY  FOLLOW UP BILATERAL URETERAL CALCULI,BACTEREMIA, SEPSIS.  HISTORY HTN, ETOH CIRRHOSIS, HX IVC FILTER ANTICARDIOLIPIN ANTIBODY, ACAP S/P BRACHY  HE FEELS BETTER  AVSS  CREAT 1.23, WBC 5.2  GOOD URINE OUTPUT, CLEAR  PLAN:  DC HOME OR TCU PER PRIMARY SERVICE  ANTIBIOTICS PER CULTURE AND SENSITIVITY FOR TOTAL 14 DAYS  REPEAT UC 10 -12 DAYS  WILL ARRANGE BILATERAL URETEROSCOPY AND LASER[MF1.1]     Revision History        User Key Date/Time User Provider Type Action    > MF1.1 5/27/2018 10:39 AM Jose Hunter MD Physician Sign            Progress Notes by Roxie Pinedo LSW at 5/27/2018  8:43 AM     Author:  Roxie Pinedo LSW Service:  (none) Author Type:      Filed:  5/27/2018 10:00 AM Date of Service:  5/27/2018  8:43 AM Creation Time:  5/27/2018  8:43 AM    Status:  Addendum :  Roxie Pinedo LSW ()         SW:  I: SW following for d/c & placement.  D: PC to [facility] admissions to f/u on referral, no male beds available. PC to [facility], no answer SW left voicemail requesting status on referral. PC to [facility] to f/u on referral, admissions rep will call back once reviewed.   P: SW continue to follow for d/c.    TREMAYNE Ghosh[BC1.1]    ADDENDUM    SW:  D: [facility] requested referral be sent again via paper, SW faxed referral to [phone]. [facility] accepted pt- after 2pm, SW to call with transport/time when made. Pt requests now a private room, ok with sharing a room for a few days until private available. Ok with private room fee. [facility] is looking into private room request & will call back SW when known.   P: SW follow for d/c.[BC1.2]       Revision History        User Key Date/Time User Provider Type Action    > BC1.2 5/27/2018 10:00 AM Roxie Pinedo LSW  Addend     [N/A] 5/27/2018  8:51 AM Roxie Pinedo LSW  Addend     BC1.1 5/27/2018  8:50 AM Roxie Pinedo LSW  Sign            Progress Notes by Omaira Coleman DO at 5/27/2018  9:31 AM     Author:  Omaira Coleman DO Service:  Hospitalist Author Type:  Physician    Filed:  5/27/2018  9:46 AM Date of Service:  5/27/2018  9:31 AM Creation Time:  5/27/2018  9:31 AM    Status:  Signed :  Omaira Coleman DO (Physician)         [facility]    Hospitalist Progress Note    Assessment & Plan   Antonio Ramirez is a 73 year old male with PMHx of recent traumatic subdural hematoma (4/7/18), alcoholic cirrhosis, CKD, hypertension, CAD, afib and CHF among other medical problems who was admitted on 5/21/2018 with generalized weakness and was subsequently found to have a UTI and klebsiella bacteremia.       Klebsiella bacteremia/sepsis due to UTI  Presented with fever and a lactate of 2.2 but no other SIRS criteria.  Abnormal UA with pyuria and leukocyte esterase.  Received zosyn empirically in ED due to sepsis. Was hypotensive after admission despite fluid challenge. Switched to ceftriaxone on admission. CT abd/pelvis showed bilateral ureteric stones with hydronephrosis. Discussed with urology and IR -- had percutaneous nephrostomy tubes placed 5/22 and ureteric stents placed 5/24. Urine culture from admission and 2/2 blood cultures ultimately grew klebsiella pneumoniae.   -- cont Rocephin while in the hospital, anticipate changing to Ceftin or Augmentin at discharge for total of 2wk course of treatment (curbsided with ID, PO abx ok, has cipro allergy)  -- urology following      Acute kidney injury on CKD stage II due to obstruction, s/p bilateral nephrostomy tube placement on 5/22  Bilateral ureteric stones with  hydronephrosis:  Baseline creatinine 0.9 - 1.0. Admission Cr 1.22 on admission, peaked at 1.85 this stay. Suspect component of sepsis and prerenal etiology (FENa <0.1). Given multiple IVF boluses on admission then started on maintenance IVFs.   -- Cr normalized after IVFs this stay, IVFs dc'd 5/25  -- now with nephrostomy tubes as above, mgmt per urology and IR  -- plan per IR is to perform nephrostogram[KW1.1] on Tues 5/29[KW1.2] and[KW1.1] possibly[KW1.2] pull the tubes (will need to turn off the stopcocks 24h prior to appointment to see if he tolerates passage of urine)  -- monitoring urine ouput, encourage po intake  -- pain management with Tylenol and prn oxycodone -- minimize narcotic use as able  -- will need to follow up with urology after discharge for additional procedures/treatment    Alcoholic cirrhosis  Alcohol abuse with withdrawal:   Continues to drink daily.  Bilirubin mildly elevated on admission (3.2), LFTs otherwise within normal limits. Thought to have some small ascites on exam but abd non-tender and clinical suspicion for SBP was low.  Initially monitored on CIWA protocol, no s/sx of withdrawal noted so this was dc'd.   -- cont MVI, thiamine and folate  -- torsemide resumed 5/27    CAD status post CABG ×3 in 2007  Atrial fibrillation  Hypertension, with hypotensive episodes.  Echo in 1/2018 showed EF 40-45%, regional wall motion abnormalities, moderately dilated RV with decreased RV systolic function, moderate MR, mild AS and pulmonary hypertension.  -- conts on PTA meds including Coreg 3.125mg BID  -- PTA torsemide 20mg daily on hold given need for IVFs this stay -- resumed 5/27    Acute on Chronic anemia and thrombocytopenia:   Suspect due to alcoholic cirrhosis. Baseline hemoglobin approximately 10-11, baseline plt 130-150. Hgb and platelets dropped this stay secondary to sepsis and procedures. Some blood tinged urine noted following procedures but no clots.  -- monitoring counts  periodically    Traumatic subdural hematoma in 4/2018:    Healing well, he has been cleared to restart anticoagulation it is awaiting clearance from his outpatient oncologist.      History of DVT PE status post IVC filter, anticardiolipin antibody:    Anticoagulation on hold for now as noted above.    Pain Assessment:[KW1.1]  Current Pain Score 5/27/2018   Patient currently in pain? -   Pain score (0-10) 6   Pain location -   Pain descriptors -   CPOT pain score -[KW1.3]   - Antonio is experiencing pain due to npehrostomy tubes. Pain management was discussed and the plan was created in a collaborative fashion.  Antonio's response to the current recommendations: compliant  - Please see the plan for pain management as documented above    FEN: no IVFs, lytes stable, regular diet  DVT Prophylaxis: PCDs  Code Status: Full Code    Disposition: Medically stable for discharge. Anticipate discharge to TCU in next 1-2d, once placement found. SW assisting with discharge planning.     Omaira Coleman    Interval History[KW1.1]   Seen this morning. Feeling well. No specific complaints. Denies cp/sob/cough, abd pain/n/v.[KW1.2]    -Data reviewed today: I reviewed all new labs and imaging results over the last 24 hours. I personally reviewed no images or EKG's today.    Physical Exam   Temp: 96.9  F (36.1  C) Temp src: Oral BP: 115/61   Heart Rate: 59 Resp: 16 SpO2: 94 % O2 Device: None (Room air)    Vitals:    05/24/18 0500 05/25/18 0229 05/27/18 0612   Weight: 104.5 kg (230 lb 4.8 oz) 105.7 kg (233 lb 1.6 oz) 105.1 kg (231 lb 12 oz)     Vital Signs with Ranges  Temp:  [96.2  F (35.7  C)-97.5  F (36.4  C)] 96.9  F (36.1  C)  Heart Rate:  [59-64] 59  Resp:  [16] 16  BP: (106-120)/(61-68) 115/61  SpO2:  [94 %-97 %] 94 %  I/O last 3 completed shifts:  In: 723 [P.O.:720; I.V.:3]  Out: 1010 [Urine:1010]    Constitutional: Resting comfortably, alert and conversing appropriately, NAD  Respiratory: CTAB, no wheeze/rales/rhonchi, no  increased work of breathing  Cardiovascular: HRRR, no MGR, trace RLE edema  GI: S, NT, ND, +BS  Skin/Integumen: warm/dry  Other:  +bilateral nephrostomy tubes draining yellow urine    Medications       carvedilol  3.125 mg Oral BID w/meals     cefTRIAXone  1 g Intravenous Q24H     fluticasone  2 spray Both Nostrils Daily     folic acid  1 mg Oral Daily     gabapentin (NEURONTIN) tablet 600 mg  600 mg Oral TID     sodium chloride (PF)  10 mL Irrigation Q8H     sodium chloride (PF)  10 mL Irrigation Q8H     vitamin  B-1  100 mg Oral Daily       Data     Recent Labs  Lab 05/27/18  0608 05/25/18  0707 05/24/18  0700  05/22/18  0737 05/21/18  1719   WBC 5.2 5.1 5.4  < > 10.6 7.0   HGB 8.9* 8.9* 9.0*  < > 10.0* 11.0*   MCV 83 83 84  < > 84 83   * 76* 68*  < > 80* 119*   INR  --   --  1.68*  --   --   --     139 138  < > 139 138   POTASSIUM 4.7 4.1 4.0  < > 3.9 4.0   CHLORIDE 110* 111* 110*  < > 109 104   CO2 21 19* 18*  < > 22 25   BUN 28 30 32*  < > 23 19   CR 1.23 1.40* 1.57*  < > 1.69* 1.22   ANIONGAP 6 9 10  < > 8 9   BENNIE 8.6 7.8* 7.4*  < > 8.1* 9.0   GLC 94 80 81  < > 106* 118*   ALBUMIN  --   --   --   --  2.8* 3.2*   PROTTOTAL  --   --   --   --  6.6* 7.5   BILITOTAL  --   --   --   --  1.5* 1.7*   ALKPHOS  --   --   --   --  89 105   ALT  --   --   --   --  16 12   AST  --   --   --   --  41 32   TROPI  --   --   --   --   --  0.027   < > = values in this interval not displayed.    No results found for this or any previous visit (from the past 24 hour(s)).[KW1.1]       Revision History        User Key Date/Time User Provider Type Action    > KW1.2 5/27/2018  9:46 AM Omaira Coleman,  Physician Sign     KW1.3 5/27/2018  9:32 AM Omaira Coleman,  Physician      KW1.1 5/27/2018  9:31 AM Omaira Coleman,  Physician             Progress Notes by Roxie Pinedo LSW at 5/26/2018 10:25 AM     Author:  Roxie Pinedo LSW Service:  (none) Author Type:       Filed:  5/26/2018  3:05 PM Date of Service:  5/26/2018 10:25 AM Creation Time:  5/26/2018 10:25 AM    Status:  Addendum :  Roxie Pinedo LSW ()         SW:  I: SW following for discharge & placement needs.  D: PC to [facility] to f/u on referral, referral was still being looked at, notes of agitation however nursing reports that has subsided no longer as agitated. Will likely d/c on neph tubes ensure [facility] can meet this need. Requested SW to fax medication list as it was not in the initial referral. SW faxed med list to [phone].    P: Continue to follow for d/c.[BC1.1]     ADDENDUM  SW:  I: SW follow for placement.  D: VM received from Edie at [facility] unable to accept pt d/t neph tubes. PC to [facility] to f/u on referral, unable to take pt d/t acuity of needs too high. PC to [facility] to f/u on referral, no male beds available. SW to get more facility options & sent out referrals to selected places.   P: SW to continue to follow for d/c needs.[BC1.2]     ADDENDUM  SW:  I: SW follow for discharge.   D: SW updated wife per previous note that wife would like to be informed as updates come in. All three facilities have declined pt at this time d/t needs & no vacancies. Wife briefly discussed other facilities that are in the area or surrounding areas, [place]. SW informed wife of [facility] & [facility]- both in [place]. SW to go see pt & gather other facilities he would like referrals sent to. SW provided list of SNF to pt to look over & decide on other facilities to send referrals to.   P: SW to continue to follow for placement.[BC1.3]      ADDENDUM  SW:  I: follow for facility placement.  D: SW met with pt. Pt would like referrals sent to [facility], & [facility]. SW sent referrals via DOD to [facility], [facility].[BC1.4] P/C to [facility] to f/u  On referral still processing. P/C to [facility]  To f/u on referral still processing, P/C to [place]  [facility] to f/u on referral no answer no VM setup.[BC1.5]  P: SW continue to follow for d/c[BC1.4]     Revision History        User Key Date/Time User Provider Type Action    > BC1.5 5/26/2018  3:05 PM Roxie Pinedo LSW  Addend     BC1.4 5/26/2018  2:14 PM Roxie Pinedo, AKSHATW  Addend     BC1.3 5/26/2018 11:39 AM Roxie Pinedo, AKSHATW  Addend     BC1.2 5/26/2018 11:19 AM Roxie Pinedo, LSW  Addend     BC1.1 5/26/2018 10:27 AM Roxie Pinedo, TREMAYNE  Sign            Progress Notes by Omaira Coleman DO at 5/26/2018  1:40 PM     Author:  Omaira Coleman DO Service:  Hospitalist Author Type:  Physician    Filed:  5/26/2018  2:48 PM Date of Service:  5/26/2018  1:40 PM Creation Time:  5/26/2018  1:40 PM    Status:  Addendum :  Omaira Coleman DO (Physician)         [facility]    Hospitalist Progress Note    Assessment & Plan   Antonio Ramirez is a 73 year old male with PMHx of recent traumatic subdural hematoma (4/7/18), alcoholic cirrhosis, CKD, hypertension, CAD, afib and CHF among other medical problems who was admitted on 5/21/2018 with generalized weakness and was subsequently found to have a UTI and klebsiella bacteremia.       Klebsiella bacteremia/sepsis due to UTI  Presented with fever and a lactate of 2.2 but no other SIRS criteria.  Abnormal UA with pyuria and leukocyte esterase.  Received zosyn empirically in ED due to sepsis. Was hypotensive after admission despite fluid challenge. Switched to ceftriaxone on admission. CT abd/pelvis showed bilateral ureteric stones with hydronephrosis. Discussed with urology and IR -- had percutaneous nephrostomy tubes placed 5/22 and ureteric stents placed 5/24. Urine culture from admission and 2/2 blood cultures ultimately grew klebsiella pneumoniae.   -- cont Rocephin while in the hospital, anticipate changing to Ceftin or Augmentin at discharge for  total of 2wk course of treatment (curbsided with ID, PO abx ok, has cipro allergy)  -- urology following      Acute kidney injury on CKD stage II due to obstruction, s/p[KW1.1] bilateral[KW1.2] nephrostomy tube placement on 5/22  Bilateral ureteric stones with hydronephrosis:  Baseline creatinine 0.9 - 1.0.  Admission Cr 1.22 on admission, peaked at 1.85 this stay. Suspect component of sepsis and prerenal etiology (FENa <0.1). Given multiple IVF boluses on admission then started on maintenance IVFs.   -- Cr improving with IVFs this stay, IVFs dc'd 5/25  -- now with nephrostomy tubes as above[KW1.1], mgmt per urology and IR[KW1.2]  -- plan per IR is to perform nephrostogram next week and pull the tube[KW1.1]s (will need to turn off the stopcocks 24h prior to appointment to see if he tolerates passage of urine)  -[KW1.2]- monitoring urine ouput[KW1.1],[KW1.2] encourage po intake  -- pain management with Tylenol and prn oxycodone -- minimize narcotic use as able  -- will need to follow up with urology after discharge for additional procedures/treatment    Alcoholic cirrhosis  Alcohol abuse with withdrawal:   Continues to drink daily.  Bilirubin mildly elevated on admission (3.2), LFTs otherwise within normal limits. Thought to have some small ascites on exam but abd non-tender and clinical suspicion for SBP was low.  Initially monitored on CIWA protocol, no s/sx of withdrawal noted so this was dc'd.   -- cont MVI, thiamine and folate  -- resume PTA diuretics once renal function back to baseline    CAD status post CABG ×3 in 2007  Atrial fibrillation  Hypertension, with hypotensive episodes.  Echo in 1/2018 showed EF 40-45%, regional wall motion abnormalities, moderately dilated RV with decreased RV systolic function, moderate MR, mild AS and pulmonary hypertension.  -- conts on PTA meds including Coreg 3.125mg BID  -- PTA torsemide 20mg daily on hold given need for IVFs this stay -- anticipate resumption in the next  1-2d.    Acute on Chronic anemia and thrombocytopenia:   Suspect due to alcoholic cirrhosis. Baseline hemoglobin approximately 10-11, baseline plt 130-150. Hgb and platelets dropped this stay secondary to sepsis and procedures. Some blood tinged urine noted following procedures but no clots.  -- monitoring counts periodically    Traumatic subdural hematoma in 4/2018:    Healing well, he has been cleared to restart anticoagulation it is awaiting clearance from his outpatient oncologist.      History of DVT PE status post IVC filter, anticardiolipin antibody:    Anticoagulation on hold for now as noted above.    Pain Assessment:  Current Pain Score 5/26/2018   Patient currently in pain? denies   Pain score (0-10) -   Pain location -   Pain descriptors -   CPOT pain score -   Antonio mendez pain level was assessed and he currently denies pain.      FEN: no IVFs, lytes stable, regular diet  DVT Prophylaxis: PCDs  Code Status: Full Code    Disposition: Anticipate discharge to TCU in next 1-2d, once placement found. SW assisting with discharge planning.[KW1.1]     Wife at bedside and updated on care plan.[KW1.2]    Omaira Coleman    Interval History[KW1.1]   Seen this afternoon. Feeling okay. No specific complaints. Eating lunch. Denies abd pain/n/v. No cp/sob. Having less urine output from R nephrostomy tube as compared to the L. Urine less bloody-appearing today.[KW1.2]     -Data reviewed today: I reviewed all new labs and imaging results over the last 24 hours. I personally reviewed no images or EKG's today.    Physical Exam   Temp: 97  F (36.1  C) Temp src: Axillary BP: 109/67   Heart Rate: 67 Resp: 16 SpO2: 95 % O2 Device: None (Room air)    Vitals:    05/23/18 0620 05/24/18 0500 05/25/18 0229   Weight: 99.1 kg (218 lb 6.4 oz) 104.5 kg (230 lb 4.8 oz) 105.7 kg (233 lb 1.6 oz)     Vital Signs with Ranges  Temp:  [95.7  F (35.4  C)-97  F (36.1  C)] 97  F (36.1  C)  Heart Rate:  [61-67] 67  Resp:  [16] 16  BP:  (108-113)/(60-67) 109/67  SpO2:  [95 %-96 %] 95 %  I/O last 3 completed shifts:  In: 220 [I.V.:220]  Out: 1130 [Urine:1130]    Constitutional: Resting comfortably,[KW1.1] alert and conversing appropriately, NAD[KW1.2]  Respiratory:[KW1.1] CTAB, no wheeze/rales/rhonchi, no increased work of breathing[KW1.2]  Cardiovascular:[KW1.1] HRRR, no MGR, trace RLE edema[KW1.2]  GI:[KW1.1] S, NT, ND, +BS[KW1.2]  Skin/Integumen:[KW1.1] warm/dry[KW1.2]  Other:[KW1.1]  +bilateral nephrostomy tubes draining yellow urine with reddish tinge[KW1.2]    Medications       carvedilol  3.125 mg Oral BID w/meals     cefTRIAXone  1 g Intravenous Q24H     fluticasone  2 spray Both Nostrils Daily     folic acid  1 mg Oral Daily     gabapentin (NEURONTIN) tablet 600 mg  600 mg Oral TID     sodium chloride (PF)  10 mL Irrigation Q8H     sodium chloride (PF)  10 mL Irrigation Q8H     sodium chloride (PF)  3 mL Intracatheter Q8H     vitamin  B-1  100 mg Oral Daily       Data     Recent Labs  Lab 05/25/18  0707 05/24/18  0700 05/23/18  1600 05/23/18  0630  05/22/18  0737 05/21/18  1719   WBC 5.1 5.4  --  6.7  --  10.6 7.0   HGB 8.9* 9.0*  --  9.5*  --  10.0* 11.0*   MCV 83 84  --  85  --  84 83   PLT 76* 68*  --  83*  --  80* 119*   INR  --  1.68*  --   --   --   --   --     138 136 139  < > 139 138   POTASSIUM 4.1 4.0 4.2 4.0  < > 3.9 4.0   CHLORIDE 111* 110* 106 109  < > 109 104   CO2 19* 18* 22 23  < > 22 25   BUN 30 32* 31* 31*  < > 23 19   CR 1.40* 1.57* 1.70* 1.75*  < > 1.69* 1.22   ANIONGAP 9 10 8 7  < > 8 9   BENNIE 7.8* 7.4* 7.4* 7.6*  < > 8.1* 9.0   GLC 80 81 99 99  < > 106* 118*   ALBUMIN  --   --   --   --   --  2.8* 3.2*   PROTTOTAL  --   --   --   --   --  6.6* 7.5   BILITOTAL  --   --   --   --   --  1.5* 1.7*   ALKPHOS  --   --   --   --   --  89 105   ALT  --   --   --   --   --  16 12   AST  --   --   --   --   --  41 32   TROPI  --   --   --   --   --   --  0.027   < > = values in this interval not displayed.    No results  found for this or any previous visit (from the past 24 hour(s)).[KW1.1]       Revision History        User Key Date/Time User Provider Type Action    > [N/A] 5/26/2018  2:48 PM Omaira Coleman,  Physician Addend     KW1.2 5/26/2018  2:46 PM Omaira Coleman,  Physician Sign     KW1.1 5/26/2018  1:40 PM Omaira Coleman,  Physician             Progress Notes by Jose Hunter MD at 5/26/2018 12:32 PM     Author:  Jose Hunter MD Service:  Urology Author Type:  Physician    Filed:  5/26/2018 12:38 PM Date of Service:  5/26/2018 12:32 PM Creation Time:  5/26/2018 12:32 PM    Status:  Signed :  Jose Hunter MD (Physician)         UROLOGY  FOLLOW UP KLEBSIELLA BACTEREMIA, SEPSIS, BILATERAL RENAL OBSTRUCTION  S/P BILATERAL PCN, SUBSEQUENT INTERNAL STENTS  ON ROCEPHIN  FEELING BETTER.  AVSS AWAKE ALERT, NAD,PCN CDI    IMPRESSION/PLAN:  1. UROSEPSIS. WILL NEED 2 WEEKS IV/ORAL ANTIBIOTICS  2. DEFINITIVE STONE THERAPY AT 2-3 WEEKS. DISCUSSED ESWL VS BILATERAL URETEROSCOPY, LASER, STONE REMOVAL AND STENT EXCHANGE.  HE PREFERS LATTER. RISK, POSSIBLE COMPLICATIONS DISCUSSED.   3. CAN DC TO HOME OR CARE UNIT THIS WEEKEND. SCHEDULED FOR NEPHROSTOGRAMS ON Tuesday +/- REMOVAL.[MF1.1]     Revision History        User Key Date/Time User Provider Type Action    > MF1.1 5/26/2018 12:38 PM Jose Hunter MD Physician Sign            Progress Notes by Sol Triplett RN at 5/25/2018  5:02 PM     Author:  Sol Triplett RN Service:  Ancillary, Case Management Author Type:      Filed:  5/25/2018  5:11 PM Date of Service:  5/25/2018  5:02 PM Creation Time:  5/25/2018  5:02 PM    Status:  Addendum :  Sol rTiplett RN ()         Received a call from Von the liaison for ARU that patient has been denied placement. He explained that the policy stands that patient's can not leave their facility for procedures. Patient would need to leave  th[PB1.1]eir[PB1.2] facility to have his nephrostomy tubes removed. Will plan for TCU placement. Patient informed. Call placed to patient's wife to inform her of plan.[PB1.1]     Revision History        User Key Date/Time User Provider Type Action    > PB1.2 5/25/2018  5:11 PM Sol Triplett RN Case Manager Addend     PB1.1 5/25/2018  5:09 PM Sol Triplett RN Case Manager Sign            Progress Notes by Veronika Maza LSW at 5/25/2018  4:16 PM     Author:  Veronika Maza LSW Service:  (none) Author Type:      Filed:  5/25/2018  4:21 PM Date of Service:  5/25/2018  4:16 PM Creation Time:  5/25/2018  4:16 PM    Status:  Signed :  Veronika Maza LSW ()         Care Transition Initial Assessment - SW  Reason For Consult: discharge planning  Met with: chart review[SM1.1]   Active Problems:    UTI (urinary tract infection)[SM1.2]       DATA  Lives With: spouse  Living Arrangements: apartment  Description of Support System: Supportive, Involved  Who is your support system?: Wife, Children  Support Assessment: Adequate family and caregiver support.   Identified issues/concerns regarding health management: SW following for d/c needs   Quality Of Family Relationships: supportive, involved     ASSESSMENT  Cognitive Status: Alert and oriented x4, agitated/hostile at times  Concerns to be addressed: Patient is a 73 year old male who was admitted to the hospital for UTI. Prior to hospitalization patient was living at home with spouse. Patient was seen by therapy and they are recommending TCU at d/c. CC updated SW who stated patient/spouse would like referrals sent to [facility]. SW sent referral and will need to update patient and spouse once assessments are complete.      PLAN  Financial costs for the patient includes  Patient given options and choices for discharge to TCU  Patient/family is agreeable to the plan?  YES  Patient Goals and Preferences:[facility].   Patient anticipates discharging to: TCU    TREMAYNE Diallo   *[phone][SM1.1]               Revision History        User Key Date/Time User Provider Type Action    > SM1.2 5/25/2018  4:21 PM Veronika Maza LSW  Sign     SM1.1 5/25/2018  4:16 PM Veronika Maza LSW              Progress Notes by Makayla Ochoa APRN CNP at 5/25/2018  3:53 PM     Author:  Makayla Ochoa APRN CNP Service:  Interventional Radiology Author Type:  Nurse Practitioner    Filed:  5/25/2018  4:10 PM Date of Service:  5/25/2018  3:53 PM Creation Time:  5/25/2018  3:53 PM    Status:  Addendum :  Makayla Ochoa APRN CNP (Nurse Practitioner)         Mr Ramirez has an IR appointment on Tuesday 5/2[CL1.1]9[CL1.2]/18 at 8am. Check in at the [facility] at 7am.     The TCU will need to turn off the stopcocks to each nephrostomy tube 24 hours prior to the patient's appointment in IR to see if he tolerates antegrade passage of urine.     Please turn off stopcocks to each nephrostomy tube at 7am on Monday 5/28/18. They may be opened if patient has increased pain, nausea, fevers or leakage around the nephrostomy tubes.     Thanks Gini [facility] Interventional Radiology CNP ([phone])[CL1.1]        Revision History        User Key Date/Time User Provider Type Action    > CL1.2 5/25/2018  4:10 PM Makayla Ochoa APRN CNP Nurse Practitioner Addend     CL1.1 5/25/2018  3:59 PM Makayla Ochoa APRN CNP Nurse Practitioner Sign            Progress Notes by Alejandra Castillo MD at 5/25/2018  2:44 PM     Author:  Alejandra Castillo MD Service:  Hospitalist Author Type:  Physician    Filed:  5/25/2018  3:02 PM Date of Service:  5/25/2018  2:44 PM Creation Time:  5/25/2018  2:44 PM    Status:  Signed :  Alejandra Castillo MD (Physician)         [facility]    Hospitalist Progress Note  Date of Service (when I saw the  patient):[TK1.1] 05/25/2018    Assessment & Plan[TK1.2]   Antonio Ramirez is a 73 year old male with complex past history recent traumatic subdural hematoma (4/7/18), alcoholic cirrhosis, chronic kidney disease, hypertension, CAD, atrial fibrillation, CHF among other chronic medical issues who presented with generalized weakness and found to have UTI.     Klebsiella bacteremia/sepsis due to UTI  Presented with fever and a lactate of 2.2 but no other SIRS criteria.  Abnormal UA with pyuria and leukocyte esterase.  Received zosyn empirically in ED due to sepsis.  -[TK1.1]S[TK1.2]witched to ceftriaxone on admission[TK1.1],[TK1.2] Patient was hypotensive[TK1.1] after admission[TK1.2] despite fluid challenge, CT abdomen pelvis[TK1.1] showed[TK1.2] bilateral ureteric stones with hydronephrosis,[TK1.1] I[TK1.2] discussed with urology and IR,[TK1.1] and[TK1.2] percutaneous nephrostomy tubes place[TK1.1]d[TK1.2] 5/22[TK1.1] then ureteric stents placed 5/24.[TK1.2]  - blood culture as well as urine culture[TK1.1] grew[TK1.2] klebsiella pneumoniae, sensitivity noted. Rocephin while in hospital, will change to ceftin or augmentin at discharge (curbsided with ID[TK1.1], PO abx ok, has cipro allergy[TK1.2]), 2 weeks course[TK1.1] total given bacteremia.[TK1.2]  - Plan is to perform nephrostogram next week and pull the tube[TK1.1] leaving stents[TK1.2]  - Urology following.     Acute kidney injury on CKD stage II due to obstruction  Bilateral ureteric stones with hydro-nephrosis:  Baseline creatinine 0.9-1.0.  Admission Cr 1.22.  - Cr worsened peaked at 1.85, suspect component of sepsis and prerenal as well given FENa less than 0.1[TK1.1]  -[TK1.2] received multiple IV fluid boluses[TK1.1] then maintenance, Cr started trending down, now 1.4[TK1.2], expect it will continue to improve with the decompression[TK1.1]. Dc IVF. On demadex PTA, resume in 1-2 days once Cr near baseline.[TK1.2]   - Continue to monitor intake and  output[TK1.1], encourage PO.[TK1.2]  - BP stable currently, urine output not as expected for post obs diuresis/ or despite IVF. Tubes are patent.   - Pain management with Tylenol and as needed oxycodone.[TK1.1] minimize[TK1.2] narcotic as able.    Alcoholic cirrhosis  Alcohol abuse with withdrawal: Continues to drink daily.  Bilirubin mildly elevated LFTs otherwise within normal limits.  - CIWA protocol initiated, scoring low now, has not needed Valium[TK1.1] >3 days[TK1.2]  - possibly have small ascites on clinical exam, non tender, no concern for SBP   - Changed banana back to p.o. multivitamins thiamine and folic acid.     CAD status post CABG ×3 in 2007  Atrial fibrillation  Hypertension,  with hypotensive episodes.  TTE in January 2018 shows ejection fraction 40-45%, regional wall motion abnormalities, moderately dilated RV with decreased RV systolic function, moderate MR, mild AS and pulmonary hypertension.  - Continue carvedilol with hold parameters, hold[TK1.1]ing diuretic- resume in 1-2 days if[TK1.2] Cr[TK1.1] continues to improve[TK1.2]  - Continue to hold clonidine[TK1.1].[TK1.2]     Acute on Chronic anemia and thrombocytopenia: Suspect due to cirrhosis.  Baseline hemoglobin approximately 10-11 g/dL, baseline plt 130-150.  - hgb and platelets have worsened due to sepsis and also due to blood loss from procedure[TK1.1], platelet stable/slightly better.[TK1.2]  - also blood tinged urine in the bags[TK1.1] but no gross blood or clots.[TK1.2]  - H[TK1.1]b 8.9, monitor.[TK1.2]     Traumatic subdural hematoma:  Healing well, he has been cleared to restart anticoagulation it is awaiting clearance from his outpatient oncologist.     History of DVT PE status post IVC filter, anticardiolipin antibody:  Anticoagulation as noted above.    # Pain Assessment:[TK1.1]  Current Pain Score 5/25/2018   Patient currently in pain? yes   Pain score (0-10) 7   Pain location Groin   Pain descriptors -   CPOT pain score -[TK1.2]    - Antonio is experiencing pain due to ureteric stones and nephrostomy tubes. Pain management was discussed and the plan was created in a collaborative fashion.  Antonio's response to the current recommendations: mixed response  - Please see the plan for pain management as documented above     DVT Prophylaxis: Pneumatic Compression Devices    Code Status:[TK1.1] Full Code[TK1.2]    Disposition: Expected discharge[TK1.1]: stable for dc, pending TCU/ARU bed availability, discussed with patient and RN, discussed with CC.     Alejandra Castillo[TK1.2], MD  Hospitalist[TK1.1]    Interval History[TK1.2]    Patient was seen and examined,[TK1.1] was angry and upset that the plan is to discharge him from hospital with the tubes/catheters. Feels he/his wife can't manage it but again declined option of TCU/ARU discharge initially. At length meeting with care co ordinator and now agreeable to go to rehab.   - Tmax 100.4  - denies pain or dyspnea.[TK1.2]     -Data reviewed today: I reviewed all new labs results over the last 24 hours, retrograde ureteric stents placed.[TK1.1]    Physical Exam   Temp: 97  F (36.1  C) Temp src: Oral BP: 115/65   Heart Rate: 72 Resp: 16 SpO2: 95 % O2 Device: None (Room air)    Vitals:    05/23/18 0620 05/24/18 0500 05/25/18 0229   Weight: 99.1 kg (218 lb 6.4 oz) 104.5 kg (230 lb 4.8 oz) 105.7 kg (233 lb 1.6 oz)[TK1.2]     Vital Signs with Ranges[TK1.1]  Temp:  [97  F (36.1  C)-100.4  F (38  C)] 97  F (36.1  C)  Heart Rate:  [70-78] 72  Resp:  [16-18] 16  BP: ()/(56-77) 115/65  SpO2:  [94 %-95 %] 95 %  I/O last 3 completed shifts:  In: 2132 [P.O.:450; I.V.:1682]  Out: 1045 [Urine:1045][TK1.2]    Constitutional:[TK1.1] AAOx3, comfortable.[TK1.2]  HEENT: PERRLA EOMI, healed scar on the Rt parietal scalp.[TK1.1] MMM[TK1.2].  Respiratory: Bilateral equal air entry, clear to auscultation. No respiratory distress.  Cardiovascular: Regular s1s2, no murmur, rub or gallop. No tachycardia.  GI: Soft,  minimally distended, non tender, bowel tones active.  Skin/Integumen: No rash, no blister.  MSK: Lt BKA  : Bilateral percutaneous nephrostomy tubes in place with bags. Draining blood tinged urine[TK1.1]- much less dark today[TK1.2], no clots.  Neuro:[TK1.1] O[TK1.2]riented ×3, follows verbal commands appropriately, CN 2-12 intact.[TK1.1]    Medications       carvedilol  3.125 mg Oral BID w/meals     cefTRIAXone  1 g Intravenous Q24H     fluticasone  2 spray Both Nostrils Daily     folic acid  1 mg Oral Daily     gabapentin (NEURONTIN) tablet 600 mg  600 mg Oral BID     sodium chloride (PF)  10 mL Irrigation Q8H     sodium chloride (PF)  10 mL Irrigation Q8H     sodium chloride (PF)  3 mL Intracatheter Q8H     vitamin  B-1  100 mg Oral Daily       Data     Recent Labs  Lab 05/25/18  0707 05/24/18  0700 05/23/18  1600 05/23/18  0630  05/22/18  0737 05/21/18  1719   WBC 5.1 5.4  --  6.7  --  10.6 7.0   HGB 8.9* 9.0*  --  9.5*  --  10.0* 11.0*   MCV 83 84  --  85  --  84 83   PLT 76* 68*  --  83*  --  80* 119*   INR  --  1.68*  --   --   --   --   --     138 136 139  < > 139 138   POTASSIUM 4.1 4.0 4.2 4.0  < > 3.9 4.0   CHLORIDE 111* 110* 106 109  < > 109 104   CO2 19* 18* 22 23  < > 22 25   BUN 30 32* 31* 31*  < > 23 19   CR 1.40* 1.57* 1.70* 1.75*  < > 1.69* 1.22   ANIONGAP 9 10 8 7  < > 8 9   BENNIE 7.8* 7.4* 7.4* 7.6*  < > 8.1* 9.0   GLC 80 81 99 99  < > 106* 118*   ALBUMIN  --   --   --   --   --  2.8* 3.2*   PROTTOTAL  --   --   --   --   --  6.6* 7.5   BILITOTAL  --   --   --   --   --  1.5* 1.7*   ALKPHOS  --   --   --   --   --  89 105   ALT  --   --   --   --   --  16 12   AST  --   --   --   --   --  41 32   TROPI  --   --   --   --   --   --  0.027   < > = values in this interval not displayed.    No results found for this or any previous visit (from the past 24 hour(s)).[TK1.2]     Revision History        User Key Date/Time User Provider Type Action    > TK1.2 5/25/2018  3:02 PM Alejandra Castillo MD  Physician Sign     TK1.1 5/25/2018  2:44 PM Alejandra Castillo MD Physician             Progress Notes by Kaylynn Caballero PA-C at 5/25/2018 10:32 AM     Author:  Kaylynn Caballero PA-C Service:  Urology Author Type:  Physician Assistant - CARA    Filed:  5/25/2018 11:04 AM Date of Service:  5/25/2018 10:32 AM Creation Time:  5/25/2018 10:32 AM    Status:  Signed :  Kaylynn Caballero PA-C (Physician Assistant - C)         [facility]    Urology Progress Note     Assessment & Plan   Antonio Ramirez is a 73 year old male who was admitted on 5/21/2018. Bilateral obstructing ureteral stones and urosepsis-- improving; Bilateral PCNs placed on 5/22 by IR, antegrade ureteral stents placed yesterday     Plan: IR planning to remove PCNs on 5/29  Follow up with Dr. Hunter in urology office in 2 weeks for definitive stone and stent management   Abx x 2wks per ID an hospital team[MG1.1]   Can try some pyridium for urgency/frequency, would opt not to start ditropan given baseline retention issues     Kaylynn Caballero PA-C  [facility], [address] [address]  [phone]  [email or web address]  Text Page (7am to 5pm)[MG1.2]    Interval History[MG1.1]   Patient very agitated about his current situation and concerns with discharge planning   Urinary urgency/frequency with stents, PVRs have been low  PCNs draining dalia urine   Creat normalizing; 1.7-->1.4[MG1.2]    Physical Exam   Temp: 97  F (36.1  C) Temp src: Oral BP: 115/65   Heart Rate: 72 Resp: 16 SpO2: 95 % O2 Device: None (Room air)    Vitals:    05/23/18 0620 05/24/18 0500 05/25/18 0229   Weight: 99.1 kg (218 lb 6.4 oz) 104.5 kg (230 lb 4.8 oz) 105.7 kg (233 lb 1.6 oz)     Vital Signs with Ranges  Temp:  [97  F (36.1  C)-100.4  F (38  C)] 97  F (36.1  C)  Heart Rate:  [69-78] 72  Resp:  [16-18] 16  BP: ()/(55-77) 115/65  SpO2:  [94 %-95 %] 95 %  I/O last 3 completed  shifts:  In: 2132 [P.O.:450; I.V.:1682]  Out: 1045 [Urine:1045][MG1.1]    Constitutional: Sitting up in bed, agitated  Eyes: no icterus  ENT: normocephalic  Respiratory: breathing unlabored  Cardiovascular: chest wall symmetric   GI: soft, NT, ND  Lymph/Hematologic: no pedal edema  Genitourinary: PCNs draining dalia urine  Skin: well perfused  Neurologic: no focal deficits  Neuropsychiatric: A&O[MG1.2]    Medications       carvedilol  3.125 mg Oral BID w/meals     cefTRIAXone  1 g Intravenous Q24H     folic acid  1 mg Oral Daily     gabapentin (NEURONTIN) tablet 600 mg  600 mg Oral BID     sodium chloride (PF)  10 mL Irrigation Q8H     sodium chloride (PF)  10 mL Irrigation Q8H     sodium chloride (PF)  3 mL Intracatheter Q8H     vitamin  B-1  100 mg Oral Daily       Data[MG1.1]   Results for orders placed or performed during the hospital encounter of 05/21/18 (from the past 24 hour(s))   Basic metabolic panel   Result Value Ref Range    Sodium 139 133 - 144 mmol/L    Potassium 4.1 3.4 - 5.3 mmol/L    Chloride 111 (H) 94 - 109 mmol/L    Carbon Dioxide 19 (L) 20 - 32 mmol/L    Anion Gap 9 3 - 14 mmol/L    Glucose 80 70 - 99 mg/dL    Urea Nitrogen 30 7 - 30 mg/dL    Creatinine 1.40 (H) 0.66 - 1.25 mg/dL    GFR Estimate 49 (L) >60 mL/min/1.7m2    GFR Estimate If Black 60 (L) >60 mL/min/1.7m2    Calcium 7.8 (L) 8.5 - 10.1 mg/dL   CBC with platelets differential   Result Value Ref Range    WBC 5.1 4.0 - 11.0 10e9/L    RBC Count 3.32 (L) 4.4 - 5.9 10e12/L    Hemoglobin 8.9 (L) 13.3 - 17.7 g/dL    Hematocrit 27.7 (L) 40.0 - 53.0 %    MCV 83 78 - 100 fl    MCH 26.8 26.5 - 33.0 pg    MCHC 32.1 31.5 - 36.5 g/dL    RDW 19.1 (H) 10.0 - 15.0 %    Platelet Count 76 (L) 150 - 450 10e9/L    Diff Method Automated Method     % Neutrophils 61.8 %    % Lymphocytes 18.5 %    % Monocytes 17.7 %    % Eosinophils 1.4 %    % Basophils 0.6 %    % Immature Granulocytes 0.0 %    Nucleated RBCs 0 0 /100    Absolute Neutrophil 3.2 1.6 - 8.3  10e9/L    Absolute Lymphocytes 1.0 0.8 - 5.3 10e9/L    Absolute Monocytes 0.9 0.0 - 1.3 10e9/L    Absolute Eosinophils 0.1 0.0 - 0.7 10e9/L    Absolute Basophils 0.0 0.0 - 0.2 10e9/L    Abs Immature Granulocytes 0.0 0 - 0.4 10e9/L    Absolute Nucleated RBC 0.0[MG1.3]           Revision History        User Key Date/Time User Provider Type Action    > MG1.3 5/25/2018 11:04 AM Kaylynn Caballero PA-C Physician Assistant - [facility] Sign     MG1.2 5/25/2018 10:58 AM Kaylynn Caballero PA-C Physician Assistant - [facility]      MG1.1 5/25/2018 10:32 AM Kaylynn Caballero PA-C Physician Assistant - [facility]             Progress Notes by Maria Luisa Sharma PT at 5/24/2018  4:06 PM     Author:  Maria Luisa Sharma PT Service:  (none) Author Type:  Physical Therapist    Filed:  5/24/2018  4:06 PM Date of Service:  5/24/2018  4:06 PM Creation Time:  5/24/2018  4:06 PM    Status:  Signed :  Maria Luisa Sharma PT (Physical Therapist)          05/24/18 1533   Quick Adds   Type of Visit Initial PT Evaluation   Living Environment   Lives With spouse   Living Arrangements apartment   Home Accessibility bed and bath on same level;grab bars present (bathtub)   Number of Stairs to Enter Home (Has elevator. )   Living Environment Comment Pateints spouse is retired and reports she can assist with ADL's but unable to provide much physical assist.    Self-Care   Dominant Hand ambidextrous   Usual Activity Tolerance good   Current Activity Tolerance poor   Activity/Exercise/Self-Care Comment Patient recently hospitalized due to a head bleed and transferred to ARU. Has been home for about 4 weeks.    Functional Level Prior   Ambulation 1-->assistive equipment   Transferring 1-->assistive equipment   Toileting 1-->assistive equipment   Bathing 1-->assistive equipment   Dressing 0-->independent   Eating 0-->independent   Communication 0-->understands/communicates without difficulty   Swallowing 0-->swallows foods/liquids without difficulty   Cognition 0 -  no cognition issues reported   Prior Functional Level Comment Patient wasn't very receptive to question so unsure question if above is completely accurate.    General Information   Onset of Illness/Injury or Date of Surgery - Date 05/21/18   Referring Physician Alejandra Castillo   Patient/Family Goals Statement To go home.    Pertinent History of Current Problem (include personal factors and/or comorbidities that impact the POC) Antonio Ramirez is a 73 year old male with complex past history recent traumatic subdural hematoma (4/7/18), alcoholic cirrhosis, chronic kidney disease, hypertension, CAD, atrial fibrillation, CHF among other chronic medical issues who presented with generalized weakness and found to have UTI.   Precautions/Limitations fall precautions   Weight-Bearing Status - LUE full weight-bearing   Weight-Bearing Status - RUE full weight-bearing   Weight-Bearing Status - LLE (Left BKA)   Weight-Bearing Status - RLE full weight-bearing   General Observations Patient not very receptive to any questions or any activity. Takes a lot of gentle encouragement.    Cognitive Status Examination   Orientation orientation to person, place and time   Level of Consciousness alert   Follows Commands and Answers Questions 100% of the time   Personal Safety and Judgment impulsive   Cognitive Comment Unable to completely assess as patient not very receptive to questions.    Pain Assessment   Patient Currently in Pain Yes, see Vital Sign flowsheet  (Rated 11 but no grimacing. Didn't appear to be in pain. )   Integumentary/Edema   Integumentary/Edema Comments Mild swelling left lower leg. No pitting but enough to keep patient from being able to don prosthesis.    Range of Motion (ROM)   ROM Comment Did note any specific limitation but again patient not very receptive to complete assessment.    Strength   Strength Comments Significant generalized weakness. Patient not receptive to MMT. Trunk weakness obvious with difficulty  "getting to edge of bed and with some instability with dynamic sitting.    Bed Mobility   Bed Mobility Comments Sup to/from sit without assist but took a lot of extra time especially with sup to sit and needed to use the rail.    Transfer Skills   Transfer Comments Unable to transfer due to inability to don prosthesis.    Balance   Balance Comments Able to maintain static sitting blalance however patient very unsteady and needed close CGA with dynamic challenges like donning prosthesis.    Sensory Examination   Sensory Perception Comments Reports bilateral hand and right lower leg numbness due to peripheral neuropathy.    Coordination   Coordination Comments Patient had a lot of diffuclty donning  and prosthesis due to bilateral hand incoordination/nubness.    General Therapy Interventions   Planned Therapy Interventions balance training;gait training;prosthetic fitting/training;strengthening;transfer training   Clinical Impression   Criteria for Skilled Therapeutic Intervention yes, treatment indicated   PT Diagnosis Impaired functional independence   Influenced by the following impairments Significant generalized weakness, numbness bilateral hands and right lower leg, swelling left residual limb   Functional limitations due to impairments Needs assist with bed mobilty, dynamic sitting balance, unable to amb, unable to don prosthesis.    Clinical Presentation Evolving/Changing   Clinical Presentation Rationale 3+comorbidities and limitations affecting evaluation   Clinical Decision Making (Complexity) Moderate complexity   Therapy Frequency` daily   Predicted Duration of Therapy Intervention (days/wks) 5 days   Anticipated Equipment Needs at Discharge (To be determined by rehab. )   Anticipated Discharge Disposition Transitional Care Facility   Risk & Benefits of therapy have been explained Yes   Patient, Family & other staff in agreement with plan of care Yes   [facility] AM-PAC TM \"6 Clicks\"   2016, " "Trustees of [facility], under license to [employer].  All rights reserved.   6 Clicks Short Forms Basic Mobility Inpatient Short Form   [facility] AM-PAC  \"6 Clicks\" V.2 Basic Mobility Inpatient Short Form   1. Turning from your back to your side while in a flat bed without using bedrails? 3 - A Little   2. Moving from lying on your back to sitting on the side of a flat bed without using bedrails? 3 - A Little   3. Moving to and from a bed to a chair (including a wheelchair)? 1 - Total   4. Standing up from a chair using your arms (e.g., wheelchair, or bedside chair)? 1 - Total   5. To walk in hospital room? 1 - Total   6. Climbing 3-5 steps with a railing? 1 - Total   Basic Mobility Raw Score (Score out of 24.Lower scores equate to lower levels of function) 10   Total Evaluation Time   Total Evaluation Time (Minutes) 10[SL1.1]        Revision History        User Key Date/Time User Provider Type Action    > SL1.1 5/24/2018  4:06 PM Maria Luisa Sharma, PT Physical Therapist Sign            Progress Notes by Kaylynn Caballero PA-C at 5/24/2018  1:50 PM     Author:  Kaylynn Caballero PA-C Service:  Urology Author Type:  Physician Assistant - [facility]    Filed:  5/24/2018  1:57 PM Date of Service:  5/24/2018  1:50 PM Creation Time:  5/24/2018  1:50 PM    Status:  Signed :  Kaylynn Caballero PA-C (Physician Assistant - C)         [facility]    Urology Progress Note     Assessment & Plan   Antonio Ramirez is a 73 year old male who was admitted on 5/21/2018. Bilateral obstructing ureteral stones and urosepsis; S/P bilateral PCN placement-- antegrade ureteral stents placed this AM by IR     Plan: IR placed antegrade ureteral stents this AM, wanted to continue PCNs and remove on 5/29.   Abx x 2wks per ID and hospital team  Follow up in urology office in 2wks for definitive stone and stent management planning    Kaylynn Caballero PA-C  [facility]  [address] [place] [address] Ave " [place] [address]  [phone]  [email or web address]  Text Page (7am to 5pm)    Interval History   Antegrade ureteral stent placed with IR this AM  Denies pain   PCNs to remain open and draining until 5/29 per IR, currently draining dalia urine     AVSS  Creat 1.70-->1.57  WBC 6.2  UCx with >100K klebsiella pneumonia  BCx with klebsiella as well     Physical Exam   Temp: 99  F (37.2  C) Temp src: Oral BP: 100/55   Heart Rate: 69 Resp: 16 SpO2: 95 % O2 Device: Nasal cannula Oxygen Delivery: 2 LPM  Vitals:    05/21/18 2100 05/23/18 0620 05/24/18 0500   Weight: 96.2 kg (212 lb) 99.1 kg (218 lb 6.4 oz) 104.5 kg (230 lb 4.8 oz)     Vital Signs with Ranges  Temp:  [99  F (37.2  C)-100.2  F (37.9  C)] 99  F (37.2  C)  Heart Rate:  [69-99] 69  Resp:  [16-18] 16  BP: (100-135)/(55-86) 100/55  SpO2:  [93 %-99 %] 95 %  I/O last 3 completed shifts:  In: 5205 [P.O.:660; I.V.:4545]  Out: 790 [Urine:790]    Constitutional: Sitting up in bed, NAD  Eyes: no icterus  ENT: normocephalic, atraumatic   Respiratory: breathing unlabored  Cardiovascular: chest wall symmetric   GI: soft, NT, ND, no suprapubic tenderness   Lymph/Hematologic: no pedal edema   Genitourinary: PCNs draining dalia urine   Skin: well perfused   Neurologic: no focal deficits  Neuropsychiatric: A&O    Medications     sodium chloride 50 mL/hr at 05/24/18 1236       carvedilol  3.125 mg Oral BID w/meals     cefTRIAXone  1 g Intravenous Q24H     folic acid  1 mg Oral Daily     gabapentin (NEURONTIN) tablet 600 mg  600 mg Oral BID     sodium chloride (PF)  10 mL Irrigation Q8H     sodium chloride (PF)  10 mL Irrigation Q8H     sodium chloride (PF)  10 mL Irrigation Q8H     sodium chloride (PF)  3 mL Intracatheter Q8H     vitamin  B-1  100 mg Oral Daily       Data[MG1.1]   Results for orders placed or performed during the hospital encounter of 05/21/18 (from the past 24 hour(s))   Basic metabolic panel   Result Value Ref Range    Sodium  136 133 - 144 mmol/L    Potassium 4.2 3.4 - 5.3 mmol/L    Chloride 106 94 - 109 mmol/L    Carbon Dioxide 22 20 - 32 mmol/L    Anion Gap 8 3 - 14 mmol/L    Glucose 99 70 - 99 mg/dL    Urea Nitrogen 31 (H) 7 - 30 mg/dL    Creatinine 1.70 (H) 0.66 - 1.25 mg/dL    GFR Estimate 39 (L) >60 mL/min/1.7m2    GFR Estimate If Black 48 (L) >60 mL/min/1.7m2    Calcium 7.4 (L) 8.5 - 10.1 mg/dL   Basic metabolic panel   Result Value Ref Range    Sodium 138 133 - 144 mmol/L    Potassium 4.0 3.4 - 5.3 mmol/L    Chloride 110 (H) 94 - 109 mmol/L    Carbon Dioxide 18 (L) 20 - 32 mmol/L    Anion Gap 10 3 - 14 mmol/L    Glucose 81 70 - 99 mg/dL    Urea Nitrogen 32 (H) 7 - 30 mg/dL    Creatinine 1.57 (H) 0.66 - 1.25 mg/dL    GFR Estimate 43 (L) >60 mL/min/1.7m2    GFR Estimate If Black 52 (L) >60 mL/min/1.7m2    Calcium 7.4 (L) 8.5 - 10.1 mg/dL   CBC with platelets differential   Result Value Ref Range    WBC 5.4 4.0 - 11.0 10e9/L    RBC Count 3.37 (L) 4.4 - 5.9 10e12/L    Hemoglobin 9.0 (L) 13.3 - 17.7 g/dL    Hematocrit 28.2 (L) 40.0 - 53.0 %    MCV 84 78 - 100 fl    MCH 26.7 26.5 - 33.0 pg    MCHC 31.9 31.5 - 36.5 g/dL    RDW 19.2 (H) 10.0 - 15.0 %    Platelet Count 68 (L) 150 - 450 10e9/L    Diff Method Manual Differential     % Neutrophils 80.0 %    % Lymphocytes 11.0 %    % Monocytes 8.0 %    % Eosinophils 1.0 %    % Basophils 0.0 %    Absolute Neutrophil 4.3 1.6 - 8.3 10e9/L    Absolute Lymphocytes 0.6 (L) 0.8 - 5.3 10e9/L    Absolute Monocytes 0.4 0.0 - 1.3 10e9/L    Absolute Eosinophils 0.1 0.0 - 0.7 10e9/L    Absolute Basophils 0.0 0.0 - 0.2 10e9/L    Teardrop Cells Slight     Acanthocytes Slight     Ovalocytes Slight     [place] Cells Slight     Platelet Estimate       Automated count confirmed.  Platelet morphology is normal.   INR   Result Value Ref Range    INR 1.68 (H) 0.86 - 1.14   Partial thromboplastin time   Result Value Ref Range    PTT 37 22 - 37 sec   Platelet function closure with reflex   Result Value Ref Range    PLT  Funct COL/EPI 99 <170 sec[MG1.2]          Revision History        User Key Date/Time User Provider Type Action    > MG1.2 5/24/2018  1:57 PM Kaylynn Caballero PA-C Physician Assistant - [facility] Sign     MG1.1 5/24/2018  1:50 PM Kaylynn Caballero PA-C Physician Assistant - [facility]             Progress Notes by Alejandra Castillo MD at 5/24/2018 12:49 PM     Author:  Alejandra Castillo MD Service:  Hospitalist Author Type:  Physician    Filed:  5/24/2018  1:10 PM Date of Service:  5/24/2018 12:49 PM Creation Time:  5/24/2018 12:49 PM    Status:  Signed :  Alejandra Castillo MD (Physician)         [facility]    Hospitalist Progress Note  Date of Service (when I saw the patient): 05/24/2018    Assessment & Plan   Antonio Ramirez is a 73 year old male with complex past history recent traumatic subdural hematoma (4/7/18), alcoholic cirrhosis, chronic kidney disease, hypertension, CAD, atrial fibrillation, CHF among other chronic medical issues who presented with generalized weakness and found to have UTI.     Klebsiella bacteremia/sepsis due to UTI  Presented with fever and a lactate of 2.2 but no other SIRS criteria.  Abnormal UA with pyuria and leukocyte esterase.  Received zosyn empirically in ED due to sepsis.  - switched to ceftriaxone on admission, and continued.  -Patient was hypotensive despite fluid challenge, CT abdomen pelvis was done which revealed bilateral ureteric stones with hydronephrosis, discussed with urology and IR, status post percutaneous nephrostomy tubes placement 5/22/18.  - blood culture as well as urine culture klebsiella pneumoniae, sensitivity noted. Rocephin while in hospital, will change to ceftin or augmentin at discharge (curbsided with ID), 2 weeks course   - s/p retrograde ureteric stents placement 5/24 am. Plan is to perform nephrostogram next week and pull the tube.   - Urology following.     Acute kidney injury on CKD stage II due to obstruction  Bilateral ureteric  stones with hydro-nephrosis:  Baseline creatinine 0.9-1.0.  Admission Cr 1.22.  - Cr worsened peaked at 1.85, received multiple IV fluid boluses, creatinine has slightly improved to 1.5 this a.m., expect it will continue to improve with the decompression   - suspect component of sepsis and prerenal as well given FENa less than 0.1  - Continue to monitor intake and output strictly.    - BP stable currently, urine output not as expected for post obs diuresis/ or despite IVF. Tubes are patent. Decrease IVF and dc later today.    -Pain management with Tylenol and as needed oxycodone.  Remains narcotic as able.    Alcoholic cirrhosis  Alcohol abuse with withdrawal: Continues to drink daily.  Bilirubin mildly elevated LFTs otherwise within normal limits.  - low salt diet  - CIWA protocol initiated, scoring low now, has not needed Valium >24 hours  - possibly have small ascites on clinical exam, non tender, no concern for SBP at this time.  - Changed banana back to p.o. multivitamins thiamine and folic acid.     CAD status post CABG ×3 in 2007  Atrial fibrillation  Hypertension,  with hypotensive episodes.  TTE in January 2018 shows ejection fraction 40-45%, regional wall motion abnormalities, moderately dilated RV with decreased RV systolic function, moderate MR, mild AS and pulmonary hypertension.  - Continue carvedilol with hold parameters, hold diuretic in favor of IV fluids until BP stable, and Cr improves.  - Continue to hold clonidine       Acute on Chronic anemia and thrombocytopenia: Suspect due to cirrhosis.  Baseline hemoglobin approximately 10-11 g/dL, baseline plt 130-150.  - hgb and platelets have worsened due to sepsis and also due to blood loss from procedure.  - also blood tinged urine in the bags.  - He does have blood in his urine post procedure/tube placement, no active bleeding. Monitor     Traumatic subdural hematoma:  Healing well, he has been cleared to restart anticoagulation it is awaiting  clearance from his outpatient oncologist.     History of DVT PE status post IVC filter, anticardiolipin antibody:  Anticoagulation as noted above.    # Pain Assessment:  Current Pain Score 5/24/2018   Patient currently in pain? denies   Pain score (0-10) -   Pain location -   Pain descriptors -   CPOT pain score -   - Antonio is experiencing pain due to ureteric stones and nephrostomy tubes. Pain management was discussed and the plan was created in a collaborative fashion.  Antonio's response to the current recommendations: mixed response  - Please see the plan for pain management as documented above     DVT Prophylaxis: Pneumatic Compression Devices    Code Status: Full Code    Disposition: Expected discharge 1- 2 days, if ok with urology, remains afebrile, and Cr continues to improve. PT eval for dc planning, updated Helena and updated her over the phone this afternoon.    Alejandra Castillo MD  Hospitalist    Interval History    Patient was seen and examined, had bilateral ureteral stents placed this am, he was sleepy, woke up and upset about having to keep the tubes/bag for longer.   - did not voice any other concern.   - Afebrile.  - BP stable    -Data reviewed today: I reviewed all new labs and imaging results over the last 24 hours, retrograde ureteric stents placed.    Physical Exam   Temp: 99  F (37.2  C) Temp src: Oral BP: 131/79   Heart Rate: 87 Resp: 16 SpO2: 99 % O2 Device: Nasal cannula Oxygen Delivery: 2 LPM  Vitals:    05/21/18 2100 05/23/18 0620 05/24/18 0500   Weight: 96.2 kg (212 lb) 99.1 kg (218 lb 6.4 oz) 104.5 kg (230 lb 4.8 oz)     Vital Signs with Ranges  Temp:  [99  F (37.2  C)-100.2  F (37.9  C)] 99  F (37.2  C)  Heart Rate:  [75-99] 87  Resp:  [16-18] 16  BP: (101-135)/(62-86) 131/79  SpO2:  [93 %-99 %] 99 %  I/O last 3 completed shifts:  In: 5205 [P.O.:660; I.V.:4545]  Out: 790 [Urine:790]    Constitutional: Sleepy but woke up appropriately, oriented ×3, does not appear to be in  distress.  HEENT: PERRLA EOMI, healed scar on the Rt parietal scalp.  Mucosa appears dry.  Respiratory: Bilateral equal air entry, clear to auscultation. No respiratory distress.  Cardiovascular: Regular s1s2, no murmur, rub or gallop. No tachycardia.  GI: Soft, minimally distended, non tender, bowel tones active.  Skin/Integumen: No rash, no blister.  MSK: Lt BKA  : Bilateral percutaneous nephrostomy tubes in place with bags. Draining blood tinged urine, no clots.  Neuro:  oriented ×3 today, follows verbal commands appropriately, CN 2-12 intact.       Medications     sodium chloride 50 mL/hr at 05/24/18 1236       carvedilol  3.125 mg Oral BID w/meals     cefTRIAXone  1 g Intravenous Q24H     folic acid  1 mg Oral Daily     gabapentin (NEURONTIN) tablet 600 mg  600 mg Oral BID     sodium chloride (PF)  10 mL Irrigation Q8H     sodium chloride (PF)  10 mL Irrigation Q8H     sodium chloride (PF)  10 mL Irrigation Q8H     sodium chloride (PF)  3 mL Intracatheter Q8H     vitamin  B-1  100 mg Oral Daily       Data     Recent Labs  Lab 05/24/18  0700 05/23/18  1600 05/23/18  0630  05/22/18  0737 05/21/18  1719   WBC 5.4  --  6.7  --  10.6 7.0   HGB 9.0*  --  9.5*  --  10.0* 11.0*   MCV 84  --  85  --  84 83   PLT 68*  --  83*  --  80* 119*   INR 1.68*  --   --   --   --   --     136 139  < > 139 138   POTASSIUM 4.0 4.2 4.0  < > 3.9 4.0   CHLORIDE 110* 106 109  < > 109 104   CO2 18* 22 23  < > 22 25   BUN 32* 31* 31*  < > 23 19   CR 1.57* 1.70* 1.75*  < > 1.69* 1.22   ANIONGAP 10 8 7  < > 8 9   BENNIE 7.4* 7.4* 7.6*  < > 8.1* 9.0   GLC 81 99 99  < > 106* 118*   ALBUMIN  --   --   --   --  2.8* 3.2*   PROTTOTAL  --   --   --   --  6.6* 7.5   BILITOTAL  --   --   --   --  1.5* 1.7*   ALKPHOS  --   --   --   --  89 105   ALT  --   --   --   --  16 12   AST  --   --   --   --  41 32   TROPI  --   --   --   --   --  0.027   < > = values in this interval not displayed.    No results found for this or any previous visit  (from the past 24 hour(s)).[TK1.1]     Revision History        User Key Date/Time User Provider Type Action    > TK1.1 5/24/2018  1:10 PM Alejandra Castillo MD Physician Sign            Progress Notes by Makayla Ochoa APRN CNP at 5/24/2018  8:33 AM     Author:  Makayla Ochoa APRN CNP Service:  Interventional Radiology Author Type:  Nurse Practitioner    Filed:  5/24/2018  8:34 AM Date of Service:  5/24/2018  8:33 AM Creation Time:  5/24/2018  8:33 AM    Status:  Attested :  Makayla Ochoa APRN CNP (Nurse Practitioner)    Cosigner:  Patricio Dye MD at 5/24/2018  9:58 AM        Attestation signed by Patricio Dye MD at 5/24/2018  9:58 AM        Attestation:  Physician Attestation   IPatricio, saw and evaluated Antonio Ramirez as part of a shared visit.  I have reviewed and discussed with the advanced practice provider their history, physical and plan.    I personally reviewed the vital signs.    I agree with assessment.    Patricio Dye  Date of Service (when I saw the patient): 05/24/18                               Interventional Radiology Pre-Procedure Sedation Assessment   Time of Assessment: 8:33 AM    Expected Level: Moderate Sedation    Indication: Sedation is required for the following type of Procedure: Bilateral ureteral stent placement    Sedation and procedural consent: Risks, benefits and alternatives were discussed with Patient, Dr Dye    PO Intake: Appropriately NPO for procedure    ASA Class: Class 2 - MILD SYSTEMIC DISEASE, NO ACUTE PROBLEMS, NO FUNCTIONAL LIMITATIONS.    Mallampati: Grade 2:  Soft palate, base of uvula, tonsillar pillars, and portion of posterior pharyngeal wall visible    Lungs: Lungs Clear with good breath sounds bilaterally    Heart: Normal heart sounds and rate    History and physical reviewed and no updates needed. I have reviewed the lab findings, diagnostic data, medications, and  the plan for sedation. I have determined this patient to be an appropriate candidate for the planned sedation and procedure and have reassessed the patient IMMEDIATELY PRIOR to sedation and procedure.    Makayla Aguilar DIPAK Ochoa CNP[CL1.1]           Revision History        User Key Date/Time User Provider Type Action    > CL1.1 5/24/2018  8:34 AM [place] Makayla Aguilar, APRN CNP Nurse Practitioner Sign            Progress Notes by Radha Anderson RN at 5/24/2018  8:46 AM     Author:  Radha Anderson RN Service:  Interventional Radiology Author Type:  Registered Nurse    Filed:  5/24/2018  9:38 AM Date of Service:  5/24/2018  8:46 AM Creation Time:  5/24/2018  8:46 AM    Status:  Signed :  Radha Anderson RN (Registered Nurse)         Interventional Radiology Intra-procedural Nursing Note    Patient Name: Antonio Ramirez  Medical Record Number: [id number]  Today's Date: May 24, 2018    Start Time:[JH1.1] 856[JH1.2]  End of procedure time:[JH1.1] 930[JH1.3]  Procedure: Ureteral stent placement - bilateral  Report given to:[JH1.1] Even RN Sta 88[JH1.4]  Time pt departs:[JH1.1]  945[JH1.4]  820 - pt to IR 2 via cart. Gini[JH1.1]lotte[JH1.2] NP into speak with pt. 830 - pt placed on exam table in a prone position.  Prepped with chloraprep and draped in a sterile fashion.[JH1.1] 856 - Dr Luire into begin procedure.[JH1.2] 911 - ureteral stent placed on left 8F x 26 CM.  Lot [id number]. Left Nephrostomy Tube exchanged 8F Lot [id number].  920 - ureteral stent placed on right 8 F x 26 cm - Lot [id number]. Right nephrostomy tube exchange 8F - Lot # [id number][JH1.5] 930 - procedure finish time.  Pt tolerated procedure well.[JH1.3]  938 - Bilateral leg bag exchanged as well.[JH1.4]   Other Notes:     Radha Anderson[JH1.2]       Revision History        User Key Date/Time User Provider Type Action    > JH1.4 5/24/2018  9:38 AM Radha Anderson RN Registered Nurse Sign     JH1.3 5/24/2018  9:31 AM Justin,  Radha BEAR RN Registered Nurse      JH1.5 2018  9:11 AM Radha Anderson RN Registered Nurse      JH1.2 2018  8:56 AM Radha Anderson RN Registered Nurse      JH1.1 2018  8:46 AM Radha Anderson RN Registered Nurse                      Procedure Notes      Procedures by Patricio Dye MD at 2018 10:02 AM     Author:  Patricio Dye MD Service:  Interventional Radiology Author Type:  Physician    Filed:  2018 10:02 AM Date of Service:  2018 10:02 AM Creation Time:  2018  9:59 AM    Status:  Signed :  Patricio Dye MD (Physician)         RADIOLOGY POST PROCEDURE NOTE w/ SEDATION  Patient name: Antonio Ramirez  MRN: [id number]  : 1945    Pre-procedure diagnosis:  Ureterolithiasis  Post-procedure diagnosis: Same    Procedure Date/Time: May 24, 2018  9:59 AM  Procedure: Bilateral nephrostograms, placement of 8 Fr X 26 cm ureteral stents, and replacement of 8 Fr PCNs.  Keep to gravity drainage.  Given urosepsis presentation, and oozing from left PCN site (in context of INR 1.7), will plan for repeat nephrostogram and tube pull next week, when convenient for patient.  Tomorrow is too soon.  Estimated blood loss: None  Specimen(s) collected with description: Please see above.    I determined this patient to be an appropriate candidate for the planned sedation and procedure and reassessed the patient IMMEDIATELY PRIOR to sedation and procedure.     The patient tolerated the procedure well with no immediate complications.  Significant findings:  Please see above.    See imaging dictation for procedural details.    Provider name: Patricio Dye  Assistant(s):None[BD1.1]         Revision History        User Key Date/Time User Provider Type Action    > BD1.1 2018 10:02 AM Patricio Dey MD Physician Sign            Procedures by Mary Pittman DO at 2018  8:34 PM     Author:  Mary Pittman  DO Nicole Service:  Interventional Radiology Author Type:  Physician    Filed:  2018  1:37 PM Date of Service:  2018  8:34 PM Creation Time:  2018  8:34 PM    Status:  Addendum :  Mary Pittman DO (Physician)         RADIOLOGY POST PROCEDURE NOTE w/ SEDATION  Patient name: Antonio Ramirez  MRN: [id number]  : 1945    Pre-procedure diagnosis: bilateral hydronephrosis and ureteral stones.   Post-procedure diagnosis: Same    Procedure Date/Time: May 22, 2018  8:34 PM  Procedure: bilateral nephrostomy tube placement.   Estimated blood loss: None  Specimen(s) collected with description: none    I determined this patient to be an appropriate candidate for the planned sedation and procedure and reassessed the patient IMMEDIATELY PRIOR to sedation and procedure.     The patient tolerated the procedure well with no immediate complications.  Significant findings:[JW1.1]  1. No left hydronephrosis. Stone noted in the proximal left ureter. Contrast flows easily thorough the ureter into the bladder, without evidence of obstruction.   2. Mild right hydronephrosis and hydroureter. Stone noted in the distal ureter. Contrast flows easily through the ureter into the bladder, without evidence of obstruction.[JW1.2]     See imaging dictation for procedural details.    Provider name: Mary Pittman  Assistant(s):None[JW1.1]           Revision History        User Key Date/Time User Provider Type Action    > JW1.2 2018  1:37 PM Mary Pittman DO Physician Addend     JW1.1 2018  8:35 PM Mary Pittman DO Physician Sign                     Progress Notes - Therapies (Notes from 18 through 18)      Progress Notes by Maria Luisa Sharma PT at 2018  4:06 PM     Author:  Maria Luisa Sharma PT Service:  (none) Author Type:  Physical Therapist    Filed:  2018  4:06 PM Date of Service:  2018  4:06 PM Creation Time:  2018  4:06 PM     Status:  Signed :  Maria Luisa Sharma, PT (Physical Therapist)          05/24/18 1533   Quick Adds   Type of Visit Initial PT Evaluation   Living Environment   Lives With spouse   Living Arrangements apartment   Home Accessibility bed and bath on same level;grab bars present (bathtub)   Number of Stairs to Enter Home (Has elevator. )   Living Environment Comment Pateints spouse is retired and reports she can assist with ADL's but unable to provide much physical assist.    Self-Care   Dominant Hand ambidextrous   Usual Activity Tolerance good   Current Activity Tolerance poor   Activity/Exercise/Self-Care Comment Patient recently hospitalized due to a head bleed and transferred to ARU. Has been home for about 4 weeks.    Functional Level Prior   Ambulation 1-->assistive equipment   Transferring 1-->assistive equipment   Toileting 1-->assistive equipment   Bathing 1-->assistive equipment   Dressing 0-->independent   Eating 0-->independent   Communication 0-->understands/communicates without difficulty   Swallowing 0-->swallows foods/liquids without difficulty   Cognition 0 - no cognition issues reported   Prior Functional Level Comment Patient wasn't very receptive to question so unsure question if above is completely accurate.    General Information   Onset of Illness/Injury or Date of Surgery - Date 05/21/18   Referring Physician Alejandra Castillo   Patient/Family Goals Statement To go home.    Pertinent History of Current Problem (include personal factors and/or comorbidities that impact the POC) Antonio Ramirez is a 73 year old male with complex past history recent traumatic subdural hematoma (4/7/18), alcoholic cirrhosis, chronic kidney disease, hypertension, CAD, atrial fibrillation, CHF among other chronic medical issues who presented with generalized weakness and found to have UTI.   Precautions/Limitations fall precautions   Weight-Bearing Status - LUE full weight-bearing   Weight-Bearing Status - RUE full  weight-bearing   Weight-Bearing Status - LLE (Left BKA)   Weight-Bearing Status - RLE full weight-bearing   General Observations Patient not very receptive to any questions or any activity. Takes a lot of gentle encouragement.    Cognitive Status Examination   Orientation orientation to person, place and time   Level of Consciousness alert   Follows Commands and Answers Questions 100% of the time   Personal Safety and Judgment impulsive   Cognitive Comment Unable to completely assess as patient not very receptive to questions.    Pain Assessment   Patient Currently in Pain Yes, see Vital Sign flowsheet  (Rated 11 but no grimacing. Didn't appear to be in pain. )   Integumentary/Edema   Integumentary/Edema Comments Mild swelling left lower leg. No pitting but enough to keep patient from being able to don prosthesis.    Range of Motion (ROM)   ROM Comment Did note any specific limitation but again patient not very receptive to complete assessment.    Strength   Strength Comments Significant generalized weakness. Patient not receptive to MMT. Trunk weakness obvious with difficulty getting to edge of bed and with some instability with dynamic sitting.    Bed Mobility   Bed Mobility Comments Sup to/from sit without assist but took a lot of extra time especially with sup to sit and needed to use the rail.    Transfer Skills   Transfer Comments Unable to transfer due to inability to don prosthesis.    Balance   Balance Comments Able to maintain static sitting blalance however patient very unsteady and needed close CGA with dynamic challenges like donning prosthesis.    Sensory Examination   Sensory Perception Comments Reports bilateral hand and right lower leg numbness due to peripheral neuropathy.    Coordination   Coordination Comments Patient had a lot of diffuclty donning  and prosthesis due to bilateral hand incoordination/nubness.    General Therapy Interventions   Planned Therapy Interventions balance  "training;gait training;prosthetic fitting/training;strengthening;transfer training   Clinical Impression   Criteria for Skilled Therapeutic Intervention yes, treatment indicated   PT Diagnosis Impaired functional independence   Influenced by the following impairments Significant generalized weakness, numbness bilateral hands and right lower leg, swelling left residual limb   Functional limitations due to impairments Needs assist with bed mobilty, dynamic sitting balance, unable to amb, unable to don prosthesis.    Clinical Presentation Evolving/Changing   Clinical Presentation Rationale 3+comorbidities and limitations affecting evaluation   Clinical Decision Making (Complexity) Moderate complexity   Therapy Frequency` daily   Predicted Duration of Therapy Intervention (days/wks) 5 days   Anticipated Equipment Needs at Discharge (To be determined by rehab. )   Anticipated Discharge Disposition Transitional Care Facility   Risk & Benefits of therapy have been explained Yes   Patient, Family & other staff in agreement with plan of care Yes   [facility] [employer] \"6 Clicks\"   2016, Trustees of [facility], under license to [employer].  All rights reserved.   6 Clicks Short Forms Basic Mobility Inpatient Short Form   [facility]  \"6 Clicks\" V.2 Basic Mobility Inpatient Short Form   1. Turning from your back to your side while in a flat bed without using bedrails? 3 - A Little   2. Moving from lying on your back to sitting on the side of a flat bed without using bedrails? 3 - A Little   3. Moving to and from a bed to a chair (including a wheelchair)? 1 - Total   4. Standing up from a chair using your arms (e.g., wheelchair, or bedside chair)? 1 - Total   5. To walk in hospital room? 1 - Total   6. Climbing 3-5 steps with a railing? 1 - Total   Basic Mobility Raw Score (Score out of 24.Lower scores equate to lower levels of function) 10   Total Evaluation Time   Total Evaluation Time (Minutes) " 10[SL1.1]        Revision History        User Key Date/Time User Provider Type Action    > SL1.1 5/24/2018  4:06 PM Maria Luisa Sharma, PT Physical Therapist Sign

## 2018-05-21 NOTE — IP AVS SNAPSHOT
` [place] [address] ONCOLOGY: [phone]            Medication Administration Report for Antonio Ramirez as of 05/27/18 1708   Legend:    Given Hold Not Given Due Canceled Entry Other Actions    Time Time (Time) Time  Time-Action       Inactive    Active    Linked        Medications 05/21/18 05/22/18 05/23/18 05/24/18 05/25/18 05/26/18 05/27/18    acetaminophen (TYLENOL) Suppository 650 mg  Dose: 650 mg  Freq: EVERY 4 HOURS PRN Route: RE  PRN Reason: mild pain  Start: 05/21/18 2051   Admin Instructions: Alternate ibuprofen (if ordered) with acetaminophen.  Maximum acetaminophen dose from all sources = 75 mg/kg/day not to exceed 4 grams/day.    Admin. Amount: 1 suppository (1 × 650 mg suppository)  Dispense Loc:  ADS 88A               acetaminophen (TYLENOL) tablet 650 mg  Dose: 650 mg  Freq: EVERY 4 HOURS PRN Route: PO  PRN Reason: mild pain  Start: 05/21/18 2051   Admin Instructions: Alternate ibuprofen (if ordered) with acetaminophen.  Maximum acetaminophen dose from all sources = 75 mg/kg/day not to exceed 4 grams/day.    Admin. Amount: 2 tablet (2 × 325 mg tablet)  Last Admin: 05/22/18 2255  Dispense Loc:  ADS 88A      0027 (650 mg)-Given       0529 (650 mg)-Given       2255 (650 mg)-Given                bacitracin-polymyxin b (POLYSPORIN) ointment  Freq: 3 TIMES DAILY PRN Route: Top  PRN Reason: wound care  Start: 05/27/18 1028   Admin Instructions: Apply to areas as needed twice daily.    Dispense Loc:  Main Pharmacy               carvedilol (COREG) tablet 3.125 mg  Dose: 3.125 mg  Freq: 2 TIMES DAILY WITH MEALS Route: PO  Start: 05/22/18 0900   Admin Instructions: Hold for SBP <110 or pulse <60    Admin. Amount: 1 tablet (1 × 3.125 mg tablet)  Last Admin: 05/27/18 1704  Dispense Loc:  ADS 88A      (1005)-Not Given       (1715)-Not Given        (0936)-Not Given       (1739)-Not Given        1044 (3.125 mg)-Given       (1747)-Not Given        0837 (3.125 mg)-Given       1849 (3.125  mg)-Given        0926 (3.125 mg)-Given       1927 (3.125 mg)-Given        (0910)-Not Given       1704 (3.125 mg)-Given           cefTRIAXone (ROCEPHIN) 1 g vial to attach to  mL bag for ADULTS or NS 50 mL bag for PEDS  Dose: 1 g  Freq: EVERY 24 HOURS Route: IV  Indications of Use: URINARY TRACT INFECTION  Last Dose: 1 g (05/26/18 2202)  Start: 05/21/18 2200   Admin. Amount: 1 g  Last Admin: 05/26/18 2202  Dispense Loc:  ADS 88A  Infused Over: 15-30 Minutes  Volume: 10 mL   Current Line: Peripheral IV 05/23/18 Right Hand    2106 (1 g)-New Bag        1938 (1 g)-New Bag               2145 (1 g)-New Bag        2222 (1 g)-New Bag        2125 (1 g)-New Bag        2202 (1 g)-New Bag        [ ] 2200           fluticasone (FLONASE) 50 MCG/ACT spray 2 spray  Dose: 2 spray  Freq: DAILY Route: BOTH NOSTRIL  Start: 05/25/18 1100   Admin. Amount: 2 spray  Last Admin: 05/27/18 0901  Dispense Loc:  Main Pharmacy         1130 (2 spray)-Given        0926 (2 spray)-Given        0901 (2 spray)-Given           folic acid (FOLVITE) tablet 1 mg  Dose: 1 mg  Freq: DAILY Route: PO  Start: 05/23/18 1000   Admin. Amount: 1 tablet (1 × 1 mg tablet)  Last Admin: 05/27/18 0905  Dispense Loc:  ADS 88A       (1026)-Not Given [C]        1044 (1 mg)-Given        0837 (1 mg)-Given        0926 (1 mg)-Given        0905 (1 mg)-Given           gabapentin (NEURONTIN) tablet 600 mg  Dose: 600 mg  Freq: 3 TIMES DAILY Route: PO  Indications of Use: NEUROGENIC PAIN  Start: 05/25/18 1800   Admin. Amount: 1 tablet (1 × 600 mg tablet)  Last Admin: 05/27/18 1704  Dispense Loc:  ADS 88A         1849 (600 mg)-Given        0926 (600 mg)-Given       1224 (600 mg)-Given       1927 (600 mg)-Given        0905 (600 mg)-Given       1131 (600 mg)-Given       1704 (600 mg)-Given           lidocaine (LMX4) cream  Freq: EVERY 1 HOUR PRN Route: Top  PRN Reason: pain  PRN Comment: with VAD insertion or accessing implanted port.  Start: 05/21/18 2051   Admin  "Instructions: Do NOT give if patient has a history of allergy to any local anesthetic or any \"bethany\" product.   Apply 30 minutes prior to VAD insertion or port access.  MAX Dose:  2.5 g (  of 5 g tube)    Dispense Loc: [facility] 88A               lidocaine 1 % 1 mL  Dose: 1 mL  Freq: EVERY 1 HOUR PRN Route: OTHER  PRN Comment: mild pain with VAD insertion or accessing implanted port  Start: 05/21/18 2051   Admin Instructions: Do NOT give if patient has a history of allergy to any local anesthetic or any \"bethany\" product. MAX dose 1 mL subcutaneous OR intradermal in divided doses.    Admin. Amount: 1 mL  Dispense Loc: [facility] FLOOR STOCK  Volume: 2 mL               melatonin tablet 1 mg  Dose: 1 mg  Freq: AT BEDTIME PRN Route: PO  PRN Reason: sleep  Start: 05/21/18 2051   Admin Instructions: Do not give unless at least 6 hours of uninterrupted sleep is expected.    Admin. Amount: 1 tablet (1 × 1 mg tablet)  Last Admin: 05/27/18 0042  Dispense Loc: [facility] 88A       0201 (1 mg)-Given [C]       2153 (1 mg)-Given           0042 (1 mg)-Given           naloxone (NARCAN) injection 0.1-0.4 mg  Dose: 0.1-0.4 mg  Freq: EVERY 2 MIN PRN Route: IV  PRN Reason: opioid reversal  Start: 05/21/18 2051   Admin Instructions: For respiratory rate LESS than or EQUAL to 8.  Partial reversal dose:  0.1 mg titrated q 2 minutes for Analgesia Side Effects Monitoring Sedation Level of 3 (frequently drowsy, arousable, drifts to sleep during conversation).Full reversal dose:  0.4 mg bolus for Analgesia Side Effects Monitoring Sedation Level of 4 (somnolent, minimal or no response to stimulation).  For ordered doses up to 2mg give IVP. Give each 0.4mg over 15 seconds in emergency situations. For non-emergent situations further dilute in 9mL of NS to facilitate titration of response.    Admin. Amount: 0.1-0.4 mg = 0.25-1 mL Conc: 0.4 mg/mL  Dispense Loc:  ADS 88A  Volume: 1 mL               ondansetron (ZOFRAN-ODT) ODT tab 4 mg  Dose: 4 mg  Freq: " EVERY 6 HOURS PRN Route: PO  PRN Reasons: nausea,vomiting  Start: 05/21/18 2051   Admin Instructions: This is Step 1 of nausea and vomiting management.  If nausea not resolved in 15 minutes, go to Step 2 prochlorperazine (COMPAZINE). Do not push through foil backing. Peel back foil and gently remove. Place on tongue immediately. Administration with liquid unnecessary  With dry hands, peel back foil backing and gently remove tablet; do not push oral disintegrating tablet through foil backing; administer immediately on tongue and oral disintegrating tablet dissolves in seconds; then swallow with saliva; liquid not required.    Admin. Amount: 1 tablet (1 × 4 mg tablet)  Dispense Loc: [facility] 88A                     Or  ondansetron (ZOFRAN) injection 4 mg  Dose: 4 mg  Freq: EVERY 6 HOURS PRN Route: IV  PRN Reasons: nausea,vomiting  Start: 05/21/18 2051   Admin Instructions: This is Step 1 of nausea and vomiting management.  If nausea not resolved in 15 minutes, go to Step 2 prochlorperazine (COMPAZINE).  Irritant. For ordered doses up to 4 mg, give IV Push undiluted over 2-5 minutes.    Admin. Amount: 4 mg = 2 mL Conc: 4 mg/2 mL  Last Admin: 05/22/18 0506  Dispense Loc: [facility] 88A  Infused Over: 2-5 Minutes  Volume: 2 mL      0506 (4 mg)-Given                oxyCODONE IR (ROXICODONE) tablet 5 mg  Dose: 5 mg  Freq: EVERY 6 HOURS PRN Route: PO  PRN Reason: other  PRN Comment: pain control or improvement in physical function. Hold dose for analgesic side effects.  Start: 05/21/18 2051   Admin Instructions: Start with the lowest dose.  May adjust dose by 5 mg every 3 hours as needed. Notify provider to assess for uncontrolled pain or analgesic side effects. Hold while on PCA or with regular IV opioid dosing.    Admin. Amount: 1 tablet (1 × 5 mg tablet)  Last Admin: 05/27/18 0905  Dispense Loc: [facility] 88A       0007 (5 mg)-Given        2033 (5 mg)-Given        0139 (5 mg)-Given       0846 (5 mg)-Given       2125 (5  mg)-Given        1927 (5 mg)-Given [C]        0057 (5 mg)-Given       0905 (5 mg)-Given           phenazopyridine (PYRIDIUM) tablet 100 mg  Dose: 100 mg  Freq: 3 TIMES DAILY PRN Route: PO  PRN Reason: urinary tract discomfort  Start: 05/25/18 1105   Admin. Amount: 1 tablet (1 × 100 mg tablet)  Last Admin: 05/27/18 0905  Dispense Loc:  ADS 88A         1130 (100 mg)-Given        0052 (100 mg)-Given       0925 (100 mg)-Given       1759 (100 mg)-Given        0905 (100 mg)-Given           polyethylene glycol (MIRALAX/GLYCOLAX) Packet 17 g  Dose: 17 g  Freq: DAILY PRN Route: PO  PRN Reason: constipation  Start: 05/21/18 2051   Admin Instructions: Give in 8oz of  water, juice, or soda. Hold for loose stools.  This is the second step of a three step constipation treatment.  1 Packet = 17 grams. Mixed prescribed dose in 8 ounces of water. Follow with 8 oz. of water.    Admin. Amount: 17 g  Dispense Loc:  ADS 88A               potassium chloride (KLOR-CON) Packet 20-40 mEq  Dose: 20-40 mEq  Freq: EVERY 2 HOURS PRN Route: ORAL OR FEED  PRN Reason: potassium supplementation  Start: 05/21/18 2051   Admin Instructions: Use if unable to tolerate tablets.  If Serum K+ 3.0-3.3, dose = 60 mEq po total dose (40 mEq x1 followed in 2 hours by 20 mEq x1). Recheck K+ level 4 hours after dose and the next AM.  If Serum K+ 2.5-2.9, dose = 80 mEq po total dose (40 mEq Q2H x2). Recheck K+ level 4 hours after dose and the next AM.  If Serum K+ less than 2.5, See IV order.  Dissolve packet contents in 4-8 ounces of cold water or juice.    Admin. Amount: 20-40 mEq  Dispense Loc:  ADS 88A               potassium chloride 10 mEq in 100 mL intermittent infusion with 10 mg lidocaine  Dose: 10 mEq  Freq: EVERY 1 HOUR PRN Route: IV  PRN Reason: potassium supplementation  Start: 05/21/18 2051   Admin Instructions: Infuse via PERIPHERAL LINE. Use potassium with lidocaine for pain with peripheral administration.  If Serum K+ 3.0-3.3, dose = 10  mEq/hr x4 doses (40 mEq IV total dose). Recheck K+ level 2 hours after dose and the next AM.  If Serum K+ less than 3.0, dose = 10 mEq/hr x6 doses (60 mEq IV total dose). Recheck K+ level 2 hours after dose and the next AM.    Admin. Amount: 10 mEq = 100 mL Conc: 10 mEq/100 mL  Dispense Loc: Contact Rx for dose  Infused Over: 1 Hours  Volume: 100 mL               potassium chloride 10 mEq in 100 mL sterile water intermittent infusion (premix)  Dose: 10 mEq  Freq: EVERY 1 HOUR PRN Route: IV  PRN Reason: potassium supplementation  Start: 05/21/18 2051   Admin Instructions: Infuse via PERIPHERAL LINE or CENTRAL LINE. Use for central line replacement if patient weight less than 65 kg, if patient is on TPN with high potassium content or if unit does not stock 20 mEq bags.   If Serum K+ 3.0-3.3, dose = 10 mEq/hr x4 doses (40 mEq IV total dose). Recheck K+ level 2 hours after dose and the next AM.   If Serum K+ less than 3.0, dose = 10 mEq/hr x6 doses (60 mEq IV total dose). Recheck K+ level 2 hours after dose and the next AM.    Admin. Amount: 10 mEq = 100 mL Conc: 10 mEq/100 mL  Dispense Loc: Contact Rx for dose  Infused Over: 60 Minutes  Volume: 100 mL               potassium chloride 20 mEq in 50 mL intermittent infusion  Dose: 20 mEq  Freq: EVERY 1 HOUR PRN Route: IV  PRN Reason: potassium supplementation  Start: 05/21/18 2051   Admin Instructions: Infuse via CENTRAL LINE Only. May need EKG if less than 65 kg or on TPN - Max rate is 0.3 mEq/kg/hr for patients not on EKG monitoring.   If Serum K+ 3.0-3.3, dose = 20 mEq/hr x2 doses (40 mEq IV total dose). Recheck K+ level 2 hours after dose and the next AM.  If Serum K+ less than 3.0, dose = 20 mEq/hr x3 doses (60 mEq IV total dose). Recheck K+ level 2 hours after dose and the next AM.    Admin. Amount: 20 mEq = 50 mL Conc: 20 mEq/50 mL  Dispense Loc: Contact Rx for dose  Volume: 50 mL               potassium chloride SA (K-DUR/KLOR-CON M) CR tablet 20-40 mEq  Dose:  20-40 mEq  Freq: EVERY 2 HOURS PRN Route: PO  PRN Reason: potassium supplementation  Start: 05/21/18 2051   Admin Instructions: Use if able to take PO.   If Serum K+ 3.0-3.3, dose = 60 mEq po total dose (40 mEq x1 followed in 2 hours by 20 mEq x1). Recheck K+ level 4 hours after dose and the next AM.  If Serum K+ 2.5-2.9, dose = 80 mEq po total dose (40 mEq Q2H x2). Recheck K+ level 4 hours after dose and the next AM.  If Serum K+ less than 2.5, See IV order.  DO NOT CRUSH    Admin. Amount: 1-2 tablet (1-2 × 20 mEq tablet)  Dispense Loc: [facility]               prochlorperazine (COMPAZINE) injection 5 mg  Dose: 5 mg  Freq: EVERY 6 HOURS PRN Route: IV  PRN Reasons: nausea,vomiting  Start: 05/21/18 2051   Admin Instructions: This is Step 2 of nausea and vomiting management. Give if nausea not resolved 15 minutes after giving ondansetron (ZOFRAN). If nausea not resolved in 15 minutes, go to Step 3 metoclopramide (REGLAN), if ordered.  For ordered doses up to 10 mg, give IV Push undiluted. Each 5mg over 1 minute.    Admin. Amount: 5 mg = 1 mL Conc: 5 mg/mL  Dispense Loc: [facility] 88[facility]  Infused Over: 1-2 Minutes  Volume: 1 mL              Or  prochlorperazine (COMPAZINE) tablet 5 mg  Dose: 5 mg  Freq: EVERY 6 HOURS PRN Route: PO  PRN Reason: vomiting  Start: 05/21/18 2051   Admin Instructions: This is Step 2 of nausea and vomiting management. Give if nausea not resolved 15 minutes after giving ondansetron (ZOFRAN). If nausea not resolved in 15 minutes, go to Step 3 metoclopramide (REGLAN), if ordered.    Admin. Amount: 1 tablet (1 × 5 mg tablet)  Dispense Loc: [facility]              Or  prochlorperazine (COMPAZINE) Suppository 12.5 mg  Dose: 12.5 mg  Freq: EVERY 12 HOURS PRN Route: RE  PRN Reasons: nausea,vomiting  Start: 05/21/18 2051   Admin Instructions: This is Step 2 of nausea and vomiting management. Give if nausea not resolved 15 minutes after giving ondansetron (ZOFRAN). If nausea not resolved in 15 minutes, go to  Step 3 metoclopramide (REGLAN), if ordered.    Admin. Amount: 0.5 suppository (0.5 × 25 mg suppository)  Dispense Loc: [facility]               QUEtiapine (SEROquel) tablet 25-50 mg  Dose: 25-50 mg  Freq: EVERY 6 HOURS PRN Route: PO  PRN Comment: Moderate agitation  Start: 05/22/18 0553   Admin Instructions: Start at lowest dose.    Admin. Amount: 1-2 tablet (1-2 × 25 mg tablet)  Dispense Loc: [facility] 88A               senna-docusate (SENOKOT-S;PERICOLACE) 8.6-50 MG per tablet 1 tablet  Dose: 1 tablet  Freq: 2 TIMES DAILY PRN Route: PO  PRN Reason: constipation  Start: 05/21/18 2051   Admin Instructions: If no bowel movement in 24 hours, increase to 2 tablets PO.  Hold for loose stools.  This is the first step of a three step constipation treatment.    Admin. Amount: 1 tablet  Dispense Loc:  ADS 88A              Or  senna-docusate (SENOKOT-S;PERICOLACE) 8.6-50 MG per tablet 2 tablet  Dose: 2 tablet  Freq: 2 TIMES DAILY PRN Route: PO  PRN Reason: constipation  Start: 05/21/18 2051   Admin Instructions: Hold for loose stools.  This is the first step of a three step constipation treatment.    Admin. Amount: 2 tablet  Dispense Loc: [facility] 88A               sodium chloride (PF) 0.9% PF flush 10 mL  Dose: 10 mL  Freq: EVERY 8 HOURS Route: IR  Start: 05/24/18 1045   Admin Instructions: Left PNT. Please flush with 10 mL NS every shift    Admin. Amount: 10 mL  Last Admin: 05/27/18 1659  Dispense Loc: [facility] Floor Stock  Volume: 10 mL  POC: IR Post-procedure        1120 (10 mL)-Given       1904 (10 mL)-Given        0118 (10 mL)-Given       0849 (10 mL)-Given       1851 (10 mL)-Given        0053 (10 mL)-Given       0927 (10 mL)-Given               0045 (10 mL)-Given [C]       0046 (10 mL)-Given [C]       0901 (10 mL)-Given       1659 (10 mL)-Given           sodium chloride (PF) 0.9% PF flush 10 mL  Dose: 10 mL  Freq: EVERY 8 HOURS Route: IR  Start: 05/24/18 1045   Admin Instructions: Right PNT-Flush with 10 mL every shift     Admin. Amount: 10 mL  Last Admin: 05/27/18 1659  [facility]  Volume: 10 mL  POC: IR Post-procedure        1120 (10 mL)-Given       1910 (10 mL)-Given        0118 (10 mL)-Given       0849 (10 mL)-Given       1852 (10 mL)-Given        0053 (10 mL)-Given       0927 (10 mL)-Given       1930 (10 mL)-Given               0901 (10 mL)-Given       1659 (10 mL)-Given           thiamine tablet 100 mg  Dose: 100 mg  Freq: DAILY Route: PO  Start: 05/23/18 1000   Admin. Amount: 1 tablet (1 × 100 mg tablet)  Last Admin: 05/27/18 0905  Dispense Loc:  ADS 88A       (1027)-Not Given [C]        1045 (100 mg)-Given        0837 (100 mg)-Given        0926 (100 mg)-Given        0905 (100 mg)-Given           torsemide (DEMADEX) tablet 20 mg  Dose: 20 mg  Freq: DAILY Route: PO  Start: 05/27/18 1000   Admin. Amount: 1 tablet (1 × 20 mg tablet)  Last Admin: 05/27/18 1131  Dispense Loc:  ADS 88A           1131 (20 mg)-Given          Discontinued Medications  Medications 05/21/18 05/22/18 05/23/18 05/24/18 05/25/18 05/26/18 05/27/18         Dose: 10 mg  Freq: EVERY 30 MIN PRN Route: PO  PRN Reason: other  PRN Comment: per CIWA-Ar score  Start: 05/22/18 0553   End: 05/26/18 9479   Admin Instructions: Oral dosing is the preferred route of administration.    Dose according to CIWA-Ar Score:    For CIWA-Ar Score LESS THAN OR EQUAL TO 7:  ~ NO diazepam (VALIUM) is to be given and   ~ repeat CIWA-Ar scale in 4 hours and PRN if symptomatic    For CIWA-Ar Score 8-12:  ~ give diazepam (VALIUM) 10 mg PO or 5 mg IV and  ~ repeat CIWA-Ar scale in 2 hours     For CIWA-Ar Score 13-15:  ~ give diazepam (VALIUM)10 mg PO or 5 mg IV and  ~ repeat CIWA-Ar scale in 1 hour     For CIWA-Ar Score GREATER THAN OR EQUAL TO 16:  ~ give diazepam (VALIUM) 10mg PO or 10 mg IV and  ~ repeat CIWA-Ar scale in 30 minutes     Doses should be withheld for nystagmus, sedation, ataxia, dysarthria or respiratory rate less than 12. If dose is being held more  than once, provider must be notified.    Admin. Amount: 2 tablet (2 × 5 mg tablet)  Dispense Loc:  ADS 88A                 1447-Med Discontinued       Or    Dose: 5-10 mg  Freq: EVERY 30 MIN PRN Route: IV  PRN Reason: other  PRN Comment: per CIWA-Ar score  Start: 05/22/18 0553   End: 05/26/18 1447   Admin Instructions: Oral dosing is the preferred route of administration.    Dose according to CIWA-Ar Score:    For CIWA-Ar Score LESS THAN OR EQUAL TO 7:  ~ NO diazepam (VALIUM) is to be given and   ~ repeat CIWA-Ar scale in 4 hours and PRN if symptomatic    For CIWA-Ar Score 8-12:  ~ give diazepam (VALIUM) 10 mg PO or 5 mg IV and  ~ repeat CIWA-Ar scale in 2 hours     For CIWA-Ar Score 13-15:  ~ give diazepam (VALIUM)10 mg PO or 5 mg IV and  ~ repeat CIWA-Ar scale in 1 hour     For CIWA-Ar Score GREATER THAN OR EQUAL TO 16:  ~ give diazepam (VALIUM) 10mg PO or 10 mg IV and  ~ repeat CIWA-Ar scale in 30 minutes     Doses should be withheld for nystagmus, sedation, ataxia, dysarthria or respiratory rate less than 12. If dose is being held more than once, provider must be notified.  Vesicant. For ordered doses up to 20 mg, give IV Push undiluted. Administer each 5mg over 1 minute. See IV push link for additional instructions.    Admin. Amount: 5-10 mg = 1-2 mL Conc: 5 mg/mL  Last Admin: 05/22/18 0637  Dispense Loc: Contact Rx for dose  Infused Over: 1-4 Minutes  Volume: 2 mL      0637 (5 mg)-Given           1447-Med Discontinued          Dose: 600 mg  Freq: 2 TIMES DAILY Route: PO  Indications of Use: NEUROGENIC PAIN  Start: 05/22/18 0900   End: 05/25/18 1551   Admin. Amount: 1 tablet (1 × 600 mg tablet)  Last Admin: 05/25/18 0837  Dispense Loc:  ADS 88A      1048 (600 mg)-Given       2206 (600 mg)-Given        0939 (600 mg)-Given       2134 (600 mg)-Given        1045 (600 mg)-Given       2220 (600 mg)-Given        0837 (600 mg)-Given       1551-Med Discontinued           Dose: 10 mL  Freq: EVERY 8 HOURS Route:  IR  Start: 05/23/18 1700   End: 05/24/18 2341   Admin Instructions: Irrigation of nephrostomy tube, SITE ONE.  Post-procedurally for IR nephrostomy tube placement and exchange.    Admin. Amount: 10 mL  Last Admin: 05/24/18 0037  Dispense Loc: [facility] Floor Stock  Volume: 10 mL  POC: IR Post-procedure       1648 (10 mL)-Given        0037 (10 mL)-Given       (0900)-Not Given [C]              2341-Med Discontinued            Dose: 3 mL  Freq: EVERY 8 HOURS Route: IK  Start: 05/21/18 2100   End: 05/27/18 0826   Admin Instructions: And Q1H PRN, to lock peripheral IV dormant line.    Admin. Amount: 3 mL  Last Admin: 05/27/18 0046  Dispense Loc: [facility] Floor Stock  Volume: 3 mL   Current Line: Peripheral IV 05/23/18 Right Hand    (2110)-Not Given        (0540)-Not Given       (1245)-Not Given       (2034)-Not Given        (0519)-Not Given       (1256)-Not Given       (2006)-Not Given        (0418)-Not Given       (1236)-Not Given       (2037)-Not Given        (0523)-Not Given       0849 (3 mL)-Given       1851 (3 mL)-Given       2127 (3 mL)-Given               0104 (3 mL)-Given       0927 (3 mL)-Given       1930 (3 mL)-Given        0046 (3 mL)-Given       0826-Med Discontinued         Dose: 3 mL  Freq: EVERY 1 HOUR PRN Route: IK  PRN Reason: line flush  PRN Comment: for peripheral IV flush post IV meds  Start: 05/21/18 2051   End: 05/27/18 0826   Admin. Amount: 3 mL  Last Admin: 05/26/18 2241  Dispense Loc: [facility] Floor Stock  Volume: 3 mL   Current Line: Peripheral IV 05/23/18 Right Hand         2241 (3 mL)-Given        0826-Med Discontinued         Rate: 50 mL/hr   Freq: CONTINUOUS Route: IV  Start: 05/21/18 2100   End: 05/25/18 1010   Last Admin: 05/25/18 0606  Dispense Loc: [facility] Floor Stock  Volume: 1,000 mL   Current Line: Peripheral IV 05/23/18 Right Hand    2106 ( )-New Bag        0510 ( )-New Bag       1503 ( )-Rate/Dose Verify [C]       2255 ( )-New Bag        0330 ( )-New Bag       0606 ( )-Rate/Dose Change [C]       1034 (  )-Rate/Dose Change       1256 ( )-New Bag       2153 ( )-New Bag        0421 ( )-New Bag       1120 ( )-New Bag       1236 ( )-Rate/Dose Change       2034 ( )-Restarted        0606 ( )-New Bag       1010-Med Discontinued      Medications 05/21/18 05/22/18 05/23/18 05/24/18 05/25/18 05/26/18 05/27/18

## 2018-05-21 NOTE — IP AVS SNAPSHOT
MRN:[id number]                      After Visit Summary   5/21/2018    Antonio Ramirez    MRN: [id number]           Thank you!     Thank you for choosing [facility] for your care. Our goal is always to provide you with excellent care. Hearing back from our patients is one way we can continue to improve our services. Please take a few minutes to complete the written survey that you may receive in the mail after you visit with us. Thank you!        Patient Information     Date Of Birth          1945        About your hospital stay     You were admitted on:  May 21, 2018 You last received care in the:  [place] [address] [facility]    You were discharged on:  May 27, 2018        Reason for your hospital stay       Management of your urinary obstruction,which required placement of bilateral nephrostomy tubes to help drain your urine. Because of this obstruction, you developed and infection in your urine and blood stream which required treatment with IV antibiotics.                  Who to Call     For medical emergencies, please call 911.  For non-urgent questions about your medical care, please call your primary care provider or clinic, [phone]          Attending Provider     Provider Specialty    Missy Lindquist MD Emergency Medicine    [facility]       Primary Care Provider Office Phone # Fax #    [facility] [phone]      After Care Instructions     Activity - Up with nursing assistance           Additional Discharge Instructions       Management of percutaneous nephrostomy tubes as directed per radiology. Stopcock will need to be turned to off position on Monday at 0900 to see if he passes urine.            Advance Diet as Tolerated       Follow this diet upon discharge: 2g salt            General info for SNF       Length of Stay Estimate: Short Term Care: Estimated # of Days <30  Condition at Discharge: Improving  Level of  care:skilled   Rehabilitation Potential: Excellent  Admission H&P remains valid and up-to-date: Yes  Recent Chemotherapy: N/A  Use Nursing Home Standing Orders: N/A            Mantoux instructions       Give two-step Mantoux (PPD) Per Facility Policy Yes                  Follow-up Appointments     Follow Up and recommended labs and tests       1. Follow up with Interventional Radiology on Tuesday 5/29 for nephrostogram to see if your tubes can be removed.   2. Follow up with your urologist as scheduled.  3. Follow up with facility physician in the next 2-3 days.                  Your next 10 appointments already scheduled     May 29, 2018  8:00 AM CDT   (Arrive by 6:30 AM)   IR NEPHROSTOMY TUBE REMOVAL BILATERAL with SHIR2   [facility] Interventional Radiology ([facility])    [address] [place] [id number] [phone]           1. You will need to have had a history and physical exam within 7 days of the procedure 2. Laboratory tests are to be obtained by your doctor prior to the exam (Platelets, PTT and INR) 3. Do not eat any solid food or milk products for 6 hours prior to the exam. You may drink clear liquids until 2 hours prior to the exam. Clear liquids include the following: water, Jell-O, clear broth, apple juice, or any non-carbonated drink that you can see through (no pop/soda!). 4. If you are or may be pregnant, contact your doctor or a Radiology nurse* prior to the day of the exam. 5. If you are over age 69 or have diabetes: Have a creatinine test within 10 days of this exam. 6. If you have diabetes, hold your a.m insulin or oral medication and bring them with you. 7. If you are taking an oral hypoglycemia agent used to control your glucose, this medication needs to be held on the morning of your exam, but may be taken 48 hours after the exam. 8. The morning of the exam you may brush your teeth and take your other medication as directed with a sip of water. 9. Do  not use aspirin products or other anti-coagulants for 24 hours prior to your procedure. 10. Bring a list of all drugs you are taking; include supplements and over-the-counter medications. Wear comfortable clothes and leave your valuables at home. 11. Make arrangements for someone to drive you home. A responsible adult must stay with you during the next 24 hours.            May 29, 2018  1:30 PM CDT   Neuro Treatment with Silvia Goel PT   [facility] ([facility])    [address]  [address]  [facility] [address]   [phone]            Jun 19, 2018   Procedure with Saima Howard MD   [facility] (--)    [address]  [facility] [id number] [phone]              Additional Services     Occupational Therapy Adult Consult       Evaluate and treat as clinically indicated.    Reason:  Physical deconditioning            Physical Therapy Adult Consult       Evaluate and treat as clinically indicated.    Reason:  Physical deconditioning                  Further instructions from your care team       Mr Ramirez has an IR appointment on Tuesday 5/29/18 at 8am. Check in at the [facility] at 7am.      The TCU will need to turn off the stopcocks to each nephrostomy tube 24 hours prior to the patient's appointment in IR to see if he tolerates antegrade passage of urine.      Please turn off stopcocks to each nephrostomy tube at 7am on Monday 5/28/18. They may be opened if patient has increased pain, nausea, fevers or leakage around the nephrostomy tubes.     Pending Results     No orders found from 5/19/2018 to 5/22/2018.            Statement of Approval     Ordered          05/27/18 1508  I have reviewed and agree with all the recommendations and orders detailed in this document.  EFFECTIVE NOW     Approved and electronically signed by:  Omaira Coleman DO             Admission Information     Date & Time Provider Department Dept.  "Phone    5/21/2018 Solomon Franklin MD [place] [address] [facility] [phone]      Your Vitals Were     Blood Pressure Temperature Respirations Height Weight Pulse Oximetry    115/61 (BP Location: Right arm) 96.9  F (36.1  C) (Oral) 16 1.854 m (6' 1\") 105.1 kg (231 lb 12 oz) 94%    BMI (Body Mass Index)                   30.58 kg/m2           T[facility] Information     [employer] gives you secure access to your electronic health record. If you see a primary care provider, you can also send messages to your care team and make appointments. If you have questions, please call your primary care clinic.  If you do not have a primary care provider, please call [phone] and they will assist you.        Care EveryWhere ID     This is your Care EveryWhere ID. This could be used by other organizations to access your [facility] medical records  [id number]        Equal Access to Services     ANTHONY DORANTES : Evelin Cabrera, belén grande, lee galloway, igor mix. So [facility] [phone].    ATENCIÓN: Si habla español, tiene a shrestha disposición servicios gratuitos de asistencia lingüística. Llame al [phone].    We comply with applicable federal civil rights laws and [place] laws. We do not discriminate on the basis of race, color, national origin, age, disability, sex, sexual orientation, or gender identity.               Review of your medicines      START taking        Dose / Directions    cefuroxime 500 MG tablet   Commonly known as:  CEFTIN   Used for:  Urinary tract infection without hematuria, site unspecified, Bacteremia due to Enterococcus        Dose:  500 mg   Take 1 tablet (500 mg) by mouth 2 times daily for 11 days   Quantity:  14 tablet   Refills:  0       folic acid 1 MG tablet   Commonly known as:  FOLVITE   Used for:  Alcohol dependence in remission (H)        Dose:  1 mg   Start taking on:  5/28/2018   Take 1 tablet (1 mg) by mouth daily   Quantity:  " 30 tablet   Refills:  0       oxyCODONE IR 5 MG tablet   Commonly known as:  ROXICODONE   Used for:  Urinary obstruction        Dose:  5 mg   Take 1 tablet (5 mg) by mouth every 6 hours as needed for other (pain control or improvement in physical function. Hold dose for analgesic side effects.)   Quantity:  6 tablet   Refills:  0       phenazopyridine 100 MG tablet   Commonly known as:  PYRIDIUM   Used for:  Urinary tract infection without hematuria, site unspecified        Dose:  100 mg   Take 1 tablet (100 mg) by mouth 3 times daily as needed for urinary tract discomfort   Quantity:  12 tablet   Refills:  0       thiamine 100 MG tablet   Used for:  Alcohol dependence in remission (H)        Dose:  100 mg   Start taking on:  5/28/2018   Take 1 tablet (100 mg) by mouth daily   Refills:  0         CONTINUE these medicines which have NOT CHANGED        Dose / Directions    ASPIRIN NOT PRESCRIBED   Commonly known as:  INTENTIONAL   Used for:  Antiphospholipid antibody syndrome (H)        Please choose reason not prescribed, below   Quantity:  0 each   Refills:  0       carvedilol 3.125 MG tablet   Commonly known as:  COREG        Dose:  3.125 mg   Take 1 tablet (3.125 mg) by mouth 2 times daily (with meals)   Quantity:  180 tablet   Refills:  1       fluticasone 50 MCG/ACT spray   Commonly known as:  FLONASE        Dose:  2 spray   Spray 2 sprays into both nostrils daily   Refills:  0       GABAPENTIN PO   Indication:  Neurogenic Pain        Dose:  600 mg   Take 600 mg by mouth 3 times daily   Refills:  0       torsemide 20 MG tablet   Commonly known as:  DEMADEX        Dose:  20 mg   Take 20 mg by mouth daily   Refills:  0         STOP taking     HYDROMORPHONE HCL PO                Where to get your medicines      Some of these will need a paper prescription and others can be bought over the counter. Ask your nurse if you have questions.     Bring a paper prescription for each of these medications     oxyCODONE IR 5  MG tablet       You don't need a prescription for these medications     cefuroxime 500 MG tablet    folic acid 1 MG tablet    phenazopyridine 100 MG tablet    thiamine 100 MG tablet                Protect others around you: Learn how to safely use, store and throw away your medicines at www.disposemymeds.org.        ANTIBIOTIC INSTRUCTION     You've Been Prescribed an Antibiotic - Now What?  Your healthcare team thinks that you or your loved one might have an infection. Some infections can be treated with antibiotics, which are powerful, life-saving drugs. Like all medications, antibiotics have side effects and should only be used when necessary. There are some important things you should know about your antibiotic treatment.      Your healthcare team may run tests before you start taking an antibiotic.    Your team may take samples (e.g., from your blood, urine or other areas) to run tests to look for bacteria. These test can be important to determine if you need an antibiotic at all and, if you do, which antibiotic will work best.      Within a few days, your healthcare team might change or even stop your antibiotic.    Your team may start you on an antibiotic while they are working to find out what is making you sick.    Your team might change your antibiotic because test results show that a different antibiotic would be better to treat your infection.    In some cases, once your team has more information, they learn that you do not need an antibiotic at all. They may find out that you don't have an infection, or that the antibiotic you're taking won't work against your infection. For example, an infection caused by a virus can't be treated with antibiotics. Staying on an antibiotic when you don't need it is more likely to be harmful than helpful.      You may experience side effects from your antibiotic.    Like all medications, antibiotics have side effects. Some of these can be serious.    Let you healthcare  team know if you have any known allergies when you are admitted to the hospital.    One significant side effect of nearly all antibiotics is the risk of severe and sometimes deadly diarrhea caused by Clostridium difficile (C. Difficile). This occurs when a person takes antibiotics because some good germs are destroyed. Antibiotic use allows C. diificile to take over, putting patients at high risk for this serious infection.    As a patient or caregiver, it is important to understand your or your loved one's antibiotic treatment. It is especially important for caregivers to speak up when patients can't speak for themselves. Here are some important questions to ask your healthcare team.    What infection is this antibiotic treating and how do you know I have that infection?    What side effects might occur from this antibiotic?    How long will I need to take this antibiotic?    Is it safe to take this antibiotic with other medications or supplements (e.g., vitamins) that I am taking?     Are there any special directions I need to know about taking this antibiotic? For example, should I take it with food?    How will I be monitored to know whether my infection is responding to the antibiotic?    What tests may help to make sure the right antibiotic is prescribed for me?      Information provided by:  www.cdc.gov/getsmart  U.S. Department of Health and Human Services  Centers for disease Control and Prevention  National Center for Emerging and Zoonotic Infectious Diseases  Division of Healthcare Quality Promotion        Information about OPIOIDS     PRESCRIPTION OPIOIDS: WHAT YOU NEED TO KNOW   You have a prescription for an opioid (narcotic) pain medicine. Opioids can cause addiction. If you have a history of chemical dependency of any type, you are at a higher risk of becoming addicted to opioids. Only take this medicine after all other options have been tried. Take it for as short a time and as few doses as  possible.     Do not:    Drive. If you drive while taking these medicines, you could be arrested for driving under the influence (DUI).    Operate heavy machinery    Do any other dangerous activities while taking these medicines.     Drink any alcohol while taking these medicines.      Take with any other medicines that contain acetaminophen. Read all labels carefully. Look for the word  acetaminophen  or  Tylenol.  Ask your pharmacist if you have questions or are unsure.    Store your pills in a secure place, locked if possible. We will not replace any lost or stolen medicine. If you don t finish your medicine, please throw away (dispose) as directed by your pharmacist. The [place] Pollution Control Agency has more information about safe disposal: https://www.pca.[facility].[place].us/living-green/managing-unwanted-medications    All opioids tend to cause constipation. Drink plenty of water and eat foods that have a lot of fiber, such as fruits, vegetables, prune juice, apple juice and high-fiber cereal. Take a laxative (Miralax, milk of magnesia, Colace, Senna) if you don t move your bowels at least every other day.              Medication List: This is a list of all your medications and when to take them. Check marks below indicate your daily home schedule. Keep this list as a reference.      Medications           Morning Afternoon Evening Bedtime As Needed    ASPIRIN NOT PRESCRIBED   Commonly known as:  INTENTIONAL   Please choose reason not prescribed, below                                carvedilol 3.125 MG tablet   Commonly known as:  COREG   Take 1 tablet (3.125 mg) by mouth 2 times daily (with meals)   Last time this was given:  3.125 mg on 5/26/2018  7:27 PM                                cefuroxime 500 MG tablet   Commonly known as:  CEFTIN   Take 1 tablet (500 mg) by mouth 2 times daily for 11 days                                fluticasone 50 MCG/ACT spray   Commonly known as:  FLONASE   Spray 2 sprays into  both nostrils daily   Last time this was given:  2 sprays on 5/27/2018  9:01 AM                                folic acid 1 MG tablet   Commonly known as:  FOLVITE   Take 1 tablet (1 mg) by mouth daily   Start taking on:  5/28/2018   Last time this was given:  1 mg on 5/27/2018  9:05 AM                                GABAPENTIN PO   Take 600 mg by mouth 3 times daily   Last time this was given:  600 mg on 5/27/2018 11:31 AM                                oxyCODONE IR 5 MG tablet   Commonly known as:  ROXICODONE   Take 1 tablet (5 mg) by mouth every 6 hours as needed for other (pain control or improvement in physical function. Hold dose for analgesic side effects.)   Last time this was given:  5 mg on 5/27/2018  9:05 AM                                phenazopyridine 100 MG tablet   Commonly known as:  PYRIDIUM   Take 1 tablet (100 mg) by mouth 3 times daily as needed for urinary tract discomfort   Last time this was given:  100 mg on 5/27/2018  9:05 AM                                thiamine 100 MG tablet   Take 1 tablet (100 mg) by mouth daily   Start taking on:  5/28/2018   Last time this was given:  100 mg on 5/27/2018  9:05 AM                                torsemide 20 MG tablet   Commonly known as:  DEMADEX   Take 20 mg by mouth daily   Last time this was given:  20 mg on 5/27/2018 11:31 AM

## 2018-05-21 NOTE — IP AVS SNAPSHOT
"[place] [address] ONCOLOGY: [phone]                                              INTERAGENCY TRANSFER FORM - PHYSICIAN ORDERS   2018                    Hospital Admission Date: 2018  GALILEA LUGO   : 1945  Sex: Male        Attending Provider: Solomon Franklin MD     Allergies:  Hmg-coa-r Inhibitors, Ciprofloxacin    Infection:  None   Service:  HOSPITALIST    Ht:  1.854 m (6' 1\")   Wt:  105.1 kg (231 lb 12 oz)   Admission Wt:  96.2 kg (212 lb)    BMI:  30.58 kg/m 2   BSA:  2.33 m 2            Patient PCP Information     Provider PCP Type    Main Hardy MD General      ED Clinical Impression     Diagnosis Description Comment Added By Time Added    Severe sepsis (H) [A41.9, R65.20] Severe sepsis (H) [A41.9, R65.20]  Missy Lindquist MD 2018  6:32 PM    Urinary tract infection without hematuria, site unspecified [N39.0] Urinary tract infection without hematuria, site unspecified [N39.0]  Missy Lindquist MD 2018  6:32 PM    Acute renal failure, unspecified acute renal failure type (H) [N17.9] Acute renal failure, unspecified acute renal failure type (H) [N17.9]  Missy Lindquist MD 2018  6:32 PM    Thrombocytopenia (H) [D69.6] Thrombocytopenia (H) [D69.6]  Missy Lindquist MD 2018  6:34 PM    Hyperbilirubinemia [E80.6] Hyperbilirubinemia [E80.6]  Missy Lindquist MD 2018  6:34 PM    Generalized weakness [R53.1] Generalized weakness [R53.1]  Missy Lindquist MD 2018  6:34 PM      Hospital Problems as of 2018              Priority Class Noted POA    UTI (urinary tract infection) Medium  2018 Yes      Non-Hospital Problems as of 2018              Priority Class Noted    Right knee DJD Medium  2014    Alcohol abuse Medium  2014    Alcoholic cirrhosis of liver (H) Medium  2014    Chronic kidney disease, stage III (moderate) Medium  2014    Physical deconditioning Medium  2014    CAD, MI in  -- S/P " CABG x 3 in 2007 Medium  5/24/2017    Prostate cancer -- S/P Radiative Seed implants in 2000 (no problems since) Medium  5/24/2017    Antiphospholipid Jina Syn, Hypercoag (DVT, PE) -- has IVC filt and Warfarin Medium  5/24/2017    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 (H) Medium  5/24/2017    Thoracic aortic aneurysm without rupture (H) Medium  5/24/2017    Chronic congestive heart failure, unspecified congestive heart failure type (H) Medium  6/30/2017    Abscess of Left BKA -- S/P I&D 12/1/17, MSSA Medium  11/29/2017    Paroxysmal a-fib (H) Medium  11/29/2017    Cellulitis and abscess of leg Medium  11/29/2017    Thrombocytopenia -- suspect related to alcohol use Medium  11/29/2017    Abscess Medium  12/1/2017    Infection of left below knee amputation (H) Medium  12/4/2017    Lymphedema Medium  12/12/2017    Anemia due to blood loss, acute Medium  12/12/2017    Yeast infection of the skin Medium  12/14/2017    Pressure ulcer, stage 2 Medium  12/14/2017    Hyperkalemia Medium  12/17/2017    Confusion Medium  12/17/2017    Fall Medium  1/29/2018    S/P craniotomy Medium  4/7/2018    Subdural hematoma (H) Medium  4/7/2018    Benign essential hypertension Medium  4/27/2018    Alcohol dependence in remission (H) Medium  5/18/2018      Code Status History     Date Active Date Inactive Code Status Order ID Comments User Context    5/27/2018  3:07 PM  Full Code [id number]  Omaira Coleman DO Outpatient    5/21/2018  8:51 PM 5/27/2018  3:07 PM Full Code [id number]  Solomon Franklin MD Inpatient    4/19/2018  3:00 PM 5/21/2018  8:51 PM Full Code [id number]  Kyle Bobby Outpatient    4/12/2018 12:49 PM 4/19/2018  3:00 PM Full Code [id number]  Coy Us MD Inpatient    4/12/2018  8:20 AM 4/12/2018 12:49 PM Full Code [id number]  Keshia Black MD Outpatient    4/7/2018  5:25 PM 4/12/2018  8:20 AM Full Code [id number]  Nidia Coon MD Inpatient    4/7/2018  5:25 PM 4/7/2018  5:25 PM  Full Code [id number]  Jono Issa MD Inpatient    1/29/2018  2:10 AM 1/29/2018  4:13 PM Full Code [id number]  Von Gomez MD Inpatient    12/8/2017  9:04 PM 12/10/2017  1:39 PM Full Code [id number]  Saima Howard MD Inpatient    12/6/2017  1:59 PM 12/8/2017  9:04 PM Full Code [id number]  Saima Howard MD Inpatient    12/4/2017 10:29 AM 12/6/2017  1:59 PM Full Code [id number]  Von Gomez MD Inpatient    12/4/2017 10:28 AM 12/4/2017 10:29 AM Full Code [id number]  Lejeune, Stacey, MD Inpatient    12/3/2017  3:04 PM 12/4/2017 10:28 AM Full Code [id number]  Omaira Coleman DO Inpatient    12/1/2017  7:52 PM 12/3/2017  3:04 PM Special Code [id number] Parameters:  Nursing to determine Saima Howard MD Inpatient    11/29/2017  7:56 PM 12/1/2017  7:52 PM Full Code [id number]  Davie Phillips MD Inpatient    9/21/2014  5:31 PM 11/29/2017  7:56 PM Full Code [id number]  Davie Gonzales PA-C Outpatient    9/19/2014  1:39 PM 9/21/2014  5:31 PM Full Code [id number]  Cecelia Garcia MD Inpatient         Medication Review      START taking        Dose / Directions Comments    cefuroxime 500 MG tablet   Commonly known as:  CEFTIN   Used for:  Urinary tract infection without hematuria, site unspecified, Bacteremia due to Enterococcus        Dose:  500 mg   Take 1 tablet (500 mg) by mouth 2 times daily for 11 days   Quantity:  14 tablet   Refills:  0        folic acid 1 MG tablet   Commonly known as:  FOLVITE   Used for:  Alcohol dependence in remission (H)        Dose:  1 mg   Start taking on:  5/28/2018   Take 1 tablet (1 mg) by mouth daily   Quantity:  30 tablet   Refills:  0        oxyCODONE IR 5 MG tablet   Commonly known as:  ROXICODONE   Used for:  Urinary obstruction        Dose:  5 mg   Take 1 tablet (5 mg) by mouth every 6 hours as needed for other (pain control or improvement in physical function. Hold dose for analgesic side effects.)   Quantity:  6 tablet   Refills:   0        phenazopyridine 100 MG tablet   Commonly known as:  PYRIDIUM   Used for:  Urinary tract infection without hematuria, site unspecified        Dose:  100 mg   Take 1 tablet (100 mg) by mouth 3 times daily as needed for urinary tract discomfort   Quantity:  12 tablet   Refills:  0        thiamine 100 MG tablet   Used for:  Alcohol dependence in remission (H)        Dose:  100 mg   Start taking on:  5/28/2018   Take 1 tablet (100 mg) by mouth daily   Refills:  0          CONTINUE these medications which have NOT CHANGED        Dose / Directions Comments    ASPIRIN NOT PRESCRIBED   Commonly known as:  INTENTIONAL   Used for:  Antiphospholipid antibody syndrome (H)        Please choose reason not prescribed, below   Quantity:  0 each   Refills:  0        carvedilol 3.125 MG tablet   Commonly known as:  COREG        Dose:  3.125 mg   Take 1 tablet (3.125 mg) by mouth 2 times daily (with meals)   Quantity:  180 tablet   Refills:  1        fluticasone 50 MCG/ACT spray   Commonly known as:  FLONASE        Dose:  2 spray   Spray 2 sprays into both nostrils daily   Refills:  0        GABAPENTIN PO   Indication:  Neurogenic Pain        Dose:  600 mg   Take 600 mg by mouth 3 times daily   Refills:  0        torsemide 20 MG tablet   Commonly known as:  DEMADEX        Dose:  20 mg   Take 20 mg by mouth daily   Refills:  0          STOP taking     HYDROMORPHONE HCL PO                     Further instructions from your care team       Mr Ramirez has an IR appointment on Tuesday 5/29/18 at 8am. Check in at the [facility] at 7am.      The TCU will need to turn off the stopcocks to each nephrostomy tube 24 hours prior to the patient's appointment in IR to see if he tolerates antegrade passage of urine.      Please turn off stopcocks to each nephrostomy tube at 7am on Monday 5/28/18. They may be opened if patient has increased pain, nausea, fevers or leakage around the nephrostomy tubes.     Summary of Visit     Reason for your  hospital stay       Management of your urinary obstruction,which required placement of bilateral nephrostomy tubes to help drain your urine. Because of this obstruction, you developed and infection in your urine and blood stream which required treatment with IV antibiotics.             After Care     Activity - Up with nursing assistance           Additional Discharge Instructions       Management of percutaneous nephrostomy tubes as directed per radiology. Stopcock will need to be turned to off position on Monday at 0900 to see if he passes urine.       Advance Diet as Tolerated       Follow this diet upon discharge: 2g salt       General info for SNF       Length of Stay Estimate: Short Term Care: Estimated # of Days <30  Condition at Discharge: Improving  Level of care:skilled   Rehabilitation Potential: Excellent  Admission H&P remains valid and up-to-date: Yes  Recent Chemotherapy: N/A  Use Nursing Home Standing Orders: N/A       Mantoux instructions       Give two-step Mantoux (PPD) Per Facility Policy Yes             Referrals     Occupational Therapy Adult Consult       Evaluate and treat as clinically indicated.    Reason:  Physical deconditioning       Physical Therapy Adult Consult       Evaluate and treat as clinically indicated.    Reason:  Physical deconditioning             Your next 10 appointments already scheduled     May 29, 2018  8:00 AM CDT   (Arrive by 6:30 AM)   IR NEPHROSTOMY TUBE REMOVAL BILATERAL with SHIR2   [facility] ([facility])    [address] [place] [id number] [phone]           1. You will need to have had a history and physical exam within 7 days of the procedure 2. Laboratory tests are to be obtained by your doctor prior to the exam (Platelets, PTT and INR) 3. Do not eat any solid food or milk products for 6 hours prior to the exam. You may drink clear liquids until 2 hours prior to the exam. Clear liquids  include the following: water, Jell-O, clear broth, apple juice, or any non-carbonated drink that you can see through (no pop/soda!). 4. If you are or may be pregnant, contact your doctor or a Radiology nurse* prior to the day of the exam. 5. If you are over age 69 or have diabetes: Have a creatinine test within 10 days of this exam. 6. If you have diabetes, hold your a.m insulin or oral medication and bring them with you. 7. If you are taking an oral hypoglycemia agent used to control your glucose, this medication needs to be held on the morning of your exam, but may be taken 48 hours after the exam. 8. The morning of the exam you may brush your teeth and take your other medication as directed with a sip of water. 9. Do not use aspirin products or other anti-coagulants for 24 hours prior to your procedure. 10. Bring a list of all drugs you are taking; include supplements and over-the-counter medications. Wear comfortable clothes and leave your valuables at home. 11. Make arrangements for someone to drive you home. A responsible adult must stay with you during the next 24 hours.            May 29, 2018  1:30 PM CDT   Neuro Treatment with Silvia Goel PT   [facility] ([facility])    [address]  [address]  [facility] [address]   [phone]            Jun 19, 2018   Procedure with Saima Howard MD   [facility] (--)    [address] [place] [address] [address]  [facility] [address]   [phone]              Follow-Up Appointment Instructions     Future Labs/Procedures    Follow Up and recommended labs and tests     Comments:    1. Follow up with Interventional Radiology on Tuesday 5/29 for nephrostogram to see if your tubes can be removed.   2. Follow up with your urologist as scheduled.  3. Follow up with facility physician in the next 2-3 days.      Follow-Up Appointment Instructions     Follow Up and recommended labs and tests       1. Follow up with  Interventional Radiology on Tuesday 5/29 for nephrostogram to see if your tubes can be removed.   2. Follow up with your urologist as scheduled.  3. Follow up with facility physician in the next 2-3 days.             Statement of Approval     Ordered          05/27/18 1508  I have reviewed and agree with all the recommendations and orders detailed in this document.  EFFECTIVE NOW     Approved and electronically signed by:  Omaira Coleman DO

## 2018-05-21 NOTE — ED PROVIDER NOTES
History     Chief Complaint:  Generalized Weakness       HPI   Antonio Ramirez is a 73 year old male with a history of liver cirrhosis and colon cancer who presents to the emergency room for evaluation of generalized weakness. EMS reports that five weeks ago the patient was brought in after a fall which caused a brain bleed and he underwent surgery. His one month check up went well. Today, EMS was originally called for a lift assist but it progressed due to the patient being extremely lethargic which started yesterday. His wife reported that he took a long nap today and slept until 1530 which is very unusual for the patient. She also stated that two days ago he was walking fine but today he could not even stand. EMS reports his blood pressure was 133/88 and his sugar was 135. They also reported that the patient felt very warm and had a fever of 102.7 upon arrival to the ED. EMS reports that his wife told them he had a slight eye droop, but they found no stroke symptoms. Previously, the patient was on blood thinners for a blood clotting disorder but has since been off of them after his surgery. He reports difficulty urinating. Awhile ago, the patient had a leg amputation due to an infection. The patient denies a history of diabetes. No symptoms of infection. No nausea, vomiting, or abdominal pain. No headache. No pain or blood with urination. No sore throat, runny nose, or other cold symptoms. He has not eaten or drank anything today.     Of note, the patient had a UTI in April 2018 that grew out Klebsiella which is sensitive to Zosyn.    Cardiac Risk Factors   Sex: Male   Tobacco: Negative   Hypertension: Positive  Diabetes: Negative  Hyperlipidemia: Positive  Family History: Negative    Allergies:  Hmg-Coa-R inhibitors  Ciprofloxacin     Medications:    Asprin  Coreg  Flonase  Gabapentin   Keppra  Demadex     Past Medical History:    Alcoholism  Amputated left leg  Anemia  Anticardiolipin antibody  syndrome  Antiplatelet or antithrombotic long-term use  Aortic stenosis  Arthritis  Balance problem  Cardiomyopathy  Coagulation disorder  Coronary artery disease  Gastro-esophageal reflux disease  Heart attack  Hiatal hernia  Hypercholesterolemia   Hypertension  Left foot drop  Liver disease  Low grade B cell lymphoproliferative disorder  Moderate mitral regurgitation  Monoclonal gammopathy  Neuropathy  Noninfectious ileitis  PE  Prostate cancer  Renal disease  Thoracic aortic aneurysm, without rupture  Thrombocytopenia  Thrombosis of leg  Urethral stricture  Subdural hematoma  Pressure ulcer, stage 2  Lymphedema  Infection of left below knee amputation  Abscess of left BKA  Paroxysmal A-fib  Cellulitis and abscess of leg  Chronic CHF  Antiphospholipid Jina Syn  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  Chronic kidney disease  Physical deconditioning   Right knee DJD    Past Surgical History:    Amputate leg below knee  Appendectomy  Apply wound VAC  Arthroplasty right knee  Cardiac surgery CABG  Cholecystectomy  Colonoscopy  Craniotomy  Cystoscopy  Endoscopic stripping veins  Esophagogastroduodenoscopy  Eye surgery cataracts  Genitourinary surgery  Umbilical hernia repair  Incision and drainage of lower left extremity  IR IVC filter placement  Irrigation and debridement lower left extremity  Right knee scope  Total hip surgery  Elbow surgery    Family History:    Lung Cancer  Heart failure: Father    Social History:  Negative for tobacco use.  Positive for alcohol use: 5-6 vodkas a night  Marital Status:       Review of Systems   Constitutional: Positive for fever.   HENT: Negative for rhinorrhea and sore throat.    Gastrointestinal: Negative for abdominal pain, nausea and vomiting.   Genitourinary: Positive for difficulty urinating.   Neurological: Positive for weakness. Negative for headaches.   All other systems reviewed and are negative.    Physical Exam   First Vitals:  BP: 134/73  Heart Rate:  "92  Temp: 102.7  F (39.3  C)  Resp: 16  Height: 185.4 cm (6' 1\")  SpO2: 93 %      Physical Exam   Nursing note and vitals reviewed.    Constitutional:  Appears well-developed and well-nourished, comfortable.    HENT:     No evidence of facial trauma. Well healing scar on the right parietal scalp.     Nose normal.  No discharge.      Oropharynx is clear and dry.  Eyes:    Conjunctivae are normal without injection. No lid droop.     Pupils are equal, round, and reactive to light.      Right eye exhibits no discharge. Left eye exhibits no discharge.      No scleral icterus.  Lymph:  No enlarged or tender cervical or submandibular lymph nodes.   Cardiovascular:  Normal rate, regular rhythm with normal S1 and S2.      Normal heart sounds and peripheral pulses 2+ and equal.       No murmur or negra.  Pulmonary:  Effort normal and breath sounds clear to auscultation bilaterally.      No respiratory distress.  No stridor.     No wheezes. No rales. No chest wall tenderness.    GI:    Soft. No distension and no mass. No tenderness.      No rebound and no guarding. No flank pain.  No HSM.  Musculoskeletal:  Normal range of motion. Prosthesis in left leg.     No edema and no tenderness.  No cyanosis.                                       Neck supple, no midline cervical tenderness.   Neurological:   GCS 15. NIH stroke scale score 0. O X 3.  No sensory deficit. Normal strength and sensation. Normal coordination. Normal finger to nose.  No hand drift. No leg drift.  EOMs intact. No diplopia. No facial droop. No slurred speech. Mental status and memory normal. Comprehension and expression of speech is normal.  General weakness but no focal weakness.  Skin:    Skin is very warm but dry. No rash noted. No diaphoresis.      No erythema. No pallor.  No lesions.  Psychiatric:   Behavior is normal. Appropriate mood and affect.     Judgment and thought content normal.       Emergency Department Course   ECG:  @ 1727  Indication: " Generalized Weakness  Vent. Rate 88 bpm. KS interval * ms. QRS duration 82 ms. QT/QTc 384/464 ms. P-R-T axis * 72 89.   Atrial fibrillation. Abnormal ECG.  No significant change when compared to previous ECG from 04/07/2018   Read @ 1736 by Dr. Lindquist.    Imaging:   Radiographic findings were communicated with the patient who voiced understanding of the findings.    Chest XR,  PA & LAT   Preliminary Result   IMPRESSION:    1. Blunting of the right costophrenic angle, suggestive of a small   right pleural effusion.   2. A small hazy opacity in the posterior aspect of the lower right   lung is nonspecific, but pneumonia is possible. A follow-up chest   radiograph would be useful in one month to confirm resolution of this   finding.        Laboratory:  CBC:  WBC 7.0, HGB 11.0 (L),  (L), otherwise WNL  CMP: Glucose 118 (H), GFR 58 (L), bilirubin 1.7 (H), albumin 3.2 (L), otherwise WNL (Creatinine 1.22)  (1739) Lactic acid: 2.2 (H)  (1915) Lactic acid: 1.0  Troponin: 0.027  Blood cultures: Pending  UA: Slightly cloudy yellow urine, large blood, protein 100, large leukocyte esterase, WBC > 182 (H),  (H), WBC clumps present, many bacteria, mucous present, otherwise WNL  Urine cultures: Pending    Interventions:  1740: Tylenol, 1000 mg, oral   1742: Normal Saline, 2,871 mL, IV bolus   1808: Zosyn, 4.5 g, IV injection    Emergency Department Course:  The patient arrived in triage where vitals were measured and recorded.   The patient was then escorted back to the emergency department.   The patient's medical records were reviewed.  Nursing notes and vitals were reviewed.  1711: I performed an exam of the patient as documented above.  The above workup was undertaken.  1754: I rechecked the patient and discussed results.  1840: I rechecked the patient and discussed results.   Findings and plan explained to the patient who consents to admission.   1853: I discussed the patient with Dr. Franklin of the hospitalist  service, who will admit the patient to an adult [facility] bed for further monitoring, evaluation, and treatment.    Impression & Plan      CMS Diagnoses:   The patient has signs of Severe Sepsis as evidenced by:    1. 2 SIRS criteria, AND  2. Suspected infection, AND   3. Organ dysfunction: Lactic Acid > 1.9    Time severe sepsis diagnosis confirmed = 1739 as this was the time when Lactate resulted, and the level was > 1.9      3 Hour Severe Sepsis Bundle Completion:  1. Initial Lactic Acid Result:   Recent Labs   Lab Test  05/21/18   1903  05/21/18   1719   LACT  1.0  2.2*     2. Blood Cultures before Antibiotics: Yes  3. Broad Spectrum Antibiotics Administered: Yes     Anti-infectives (Future)    Start     Dose/Rate Route Frequency Ordered Stop    05/21/18 2200  cefTRIAXone (ROCEPHIN) 1 g vial to attach to  mL bag for ADULTS or NS 50 mL bag for PEDS      1 g  over 15-30 Minutes Intravenous EVERY 24 HOURS 05/21/18 2051          4. 2871 ml of IV fluids.  Ideal body weight: 79.9 kg (176 lb 2.4 oz)  Adjusted ideal body weight: 86.2 kg (190 lb 0.8 oz)    Severe Sepsis reassessment:  1. Repeat Lactic Acid Level: 1.0  2. MAP>65 after initial IVF bolus, will continue to monitor fluid status and vital signs       Medical Decision Making:  He comes in with increased weakness and fever.  A sepsis workup was ordered along with cultures.  He had not ate or drank much today and looked dry so the 30 cc/kg bolus was ordered of normal saline.  When his lactic acid came back at 2.2, I did not have any of his other results back and did not have a source, so I started Zosyn IV.  His urine came back infected but the rest of his workup was unremarkable for source of infection.  His white count is normal. A chest x-ray showed a small pleural effusion and there is hazy area but he has not had any signs of pneumonia.  Vital signs are normal here.  Patient had the head bleed and surgery in the last month or 2 but has had no headaches or no  focal neurologic complaints so I did not rescan him.  He did get Tylenol for the fever.  He will be admitted to Dr. Franklin.  He did have some mild renal failure which was probably related to the dehydration.  His bilirubin was up slightly as well and his platelets were slightly low.  I repeated his lactic acid after about 1500 cc of fluid and it is down to 1.0.  I believe a lot of the elevation was due to dehydration although he met criteria for severe sepsis.    Diagnosis:    ICD-10-CM    1. Severe sepsis (H) A41.9 Blood culture    R65.20 Blood culture     Lactic acid   2. Urinary tract infection without hematuria, site unspecified N39.0    3. Acute renal failure, unspecified acute renal failure type (H) N17.9    4. Thrombocytopenia (H) D69.6    5. Hyperbilirubinemia E80.6    6. Generalized weakness R53.1        Disposition:  Admitted to an adult med bed.  Continue Zosyn, awaiting cultures.    I, Mario Castro, am serving as a scribe on 5/21/2018 at 5:11 PM to personally document services performed by Dr. Lindquist based on my observations and the provider's statements to me.     Mario Castro  5/21/2018    EMERGENCY DEPARTMENT       Missy Lindquist MD  05/21/18 7846

## 2018-05-21 NOTE — LETTER
Transition Communication Hand-off for Care Transitions to Next Level of Care Provider    Name: Antonio Ramirez  : 1945  MRN #: [id number]  Primary Care Provider: Main Hardy    Primary Clinic: [facility] [address]    Reason for Hospitalization:  Hyperbilirubinemia [E80.6]  Thrombocytopenia (H) [D69.6]  Generalized weakness [R53.1]  Severe sepsis (H) [A41.9, R65.20]  Urinary tract infection without hematuria, site unspecified [N39.0]  Acute renal failure, unspecified acute renal failure type (H) [N17.9]  Admit Date/Time: 2018  5:09 PM   Discharge Date: 18  Payor Source: Payor: MEDICARE / Plan: MEDICARE / Product Type: Medicare /   Readmission Assessment Measure (MANSOOR) Risk Score/category:  ELEVATED  Reason for Communication Hand-off Referral: elevated readmission risk    Plan of Care Goals/Milestone Events:  Hx of recent traumatic subdural hematoma (18), alcoholic cirrhosis, CKD, hypertension, CAD, afib and CHF.  Admitted on 2018 with generalized weakness,   1. found UTI and klebsiella bacteremia. D/c'd to [facility] with 2 week course IV abx.    2. ANSELMO on CKDII due to obstruction (bilateral ureteric stones with hydronephrosis), neph tube placed .  Neph tubes to be pulled .      Discharge Plan:  Discharge Plan:       Most Recent Value    Concerns To Be Addressed all concerns addressed in this encounter       Concern for non-adherence with plan of care: No so long as has support of TCU  Discharge Needs Assessment:  Needs       Most Recent Value    Anticipated Changes Related to Illness inability to care for self    Current Discharge Risk physical impairment    # of Referrals Placed by CTS Post Acute Facilities    PAS Number [id number]        Follow-up specialty is recommended: Yes urology    Follow-up plan:  Future Appointments  Date Time Provider Department Center   2018 8:00 AM [facility]   2018 1:30 PM Silvia Goel PT [facility]  [facility]     Any outstanding tests or procedures:      Referrals     Future Labs/Procedures    Occupational Therapy Adult Consult     Comments:    Evaluate and treat as clinically indicated.    Reason:  Physical deconditioning    Physical Therapy Adult Consult     Comments:    Evaluate and treat as clinically indicated.    Reason:  Physical deconditioning          Key Recommendations:  Will need PCP followup following TCU stay, as well as urology given the stents.  Please continue to closely follow this patient in clinic once out of the TC U! Thank you,     Yoly Mohr RN, BSN  [facility] Care Coordinator   Mobile Phone: [phone]    AVS/Discharge Summary is the source of truth; this is a helpful guide for improved communication of patient story

## 2018-05-22 ENCOUNTER — APPOINTMENT (OUTPATIENT)
Dept: CT IMAGING | Facility: CLINIC | Age: 73
DRG: 872 | End: 2018-05-22
Attending: HOSPITALIST
Payer: MEDICARE

## 2018-05-22 ENCOUNTER — APPOINTMENT (OUTPATIENT)
Dept: INTERVENTIONAL RADIOLOGY/VASCULAR | Facility: CLINIC | Age: 73
DRG: 872 | End: 2018-05-22
Attending: HOSPITALIST
Payer: MEDICARE

## 2018-05-22 ENCOUNTER — PATIENT OUTREACH (OUTPATIENT)
Dept: FAMILY MEDICINE | Facility: CLINIC | Age: 73
End: 2018-05-22

## 2018-05-22 LAB
ALBUMIN SERPL-MCNC: 2.8 G/DL (ref 3.4–5)
ALP SERPL-CCNC: 89 U/L (ref 40–150)
ALT SERPL W P-5'-P-CCNC: 16 U/L (ref 0–70)
ANION GAP SERPL CALCULATED.3IONS-SCNC: 6 MMOL/L (ref 3–14)
ANION GAP SERPL CALCULATED.3IONS-SCNC: 6 MMOL/L (ref 3–14)
ANION GAP SERPL CALCULATED.3IONS-SCNC: 8 MMOL/L (ref 3–14)
AST SERPL W P-5'-P-CCNC: 41 U/L (ref 0–45)
BILIRUB DIRECT SERPL-MCNC: 0.7 MG/DL (ref 0–0.2)
BILIRUB SERPL-MCNC: 1.5 MG/DL (ref 0.2–1.3)
BUN SERPL-MCNC: 23 MG/DL (ref 7–30)
BUN SERPL-MCNC: 26 MG/DL (ref 7–30)
BUN SERPL-MCNC: 26 MG/DL (ref 7–30)
CALCIUM SERPL-MCNC: 7.7 MG/DL (ref 8.5–10.1)
CALCIUM SERPL-MCNC: 7.8 MG/DL (ref 8.5–10.1)
CALCIUM SERPL-MCNC: 8.1 MG/DL (ref 8.5–10.1)
CHLORIDE SERPL-SCNC: 109 MMOL/L (ref 94–109)
CHLORIDE SERPL-SCNC: 111 MMOL/L (ref 94–109)
CHLORIDE SERPL-SCNC: 112 MMOL/L (ref 94–109)
CO2 SERPL-SCNC: 22 MMOL/L (ref 20–32)
CO2 SERPL-SCNC: 23 MMOL/L (ref 20–32)
CO2 SERPL-SCNC: 24 MMOL/L (ref 20–32)
CREAT SERPL-MCNC: 1.69 MG/DL (ref 0.66–1.25)
CREAT SERPL-MCNC: 1.82 MG/DL (ref 0.66–1.25)
CREAT SERPL-MCNC: 1.85 MG/DL (ref 0.66–1.25)
CREAT UR-MCNC: 234 MG/DL
ERYTHROCYTE [DISTWIDTH] IN BLOOD BY AUTOMATED COUNT: 18.7 % (ref 10–15)
FRACT EXCRET NA UR+SERPL-RTO: <0.1 %
GFR SERPL CREATININE-BSD FRML MDRD: 36 ML/MIN/1.7M2
GFR SERPL CREATININE-BSD FRML MDRD: 36 ML/MIN/1.7M2
GFR SERPL CREATININE-BSD FRML MDRD: 40 ML/MIN/1.7M2
GLUCOSE SERPL-MCNC: 106 MG/DL (ref 70–99)
GLUCOSE SERPL-MCNC: 127 MG/DL (ref 70–99)
GLUCOSE SERPL-MCNC: 129 MG/DL (ref 70–99)
HCT VFR BLD AUTO: 31.6 % (ref 40–53)
HGB BLD-MCNC: 10 G/DL (ref 13.3–17.7)
MAGNESIUM SERPL-MCNC: 1.5 MG/DL (ref 1.6–2.3)
MCH RBC QN AUTO: 26.5 PG (ref 26.5–33)
MCHC RBC AUTO-ENTMCNC: 31.6 G/DL (ref 31.5–36.5)
MCV RBC AUTO: 84 FL (ref 78–100)
PLATELET # BLD AUTO: 80 10E9/L (ref 150–450)
POTASSIUM SERPL-SCNC: 3.9 MMOL/L (ref 3.4–5.3)
POTASSIUM SERPL-SCNC: 4.1 MMOL/L (ref 3.4–5.3)
POTASSIUM SERPL-SCNC: 4.2 MMOL/L (ref 3.4–5.3)
PROT SERPL-MCNC: 6.6 G/DL (ref 6.8–8.8)
RBC # BLD AUTO: 3.78 10E12/L (ref 4.4–5.9)
SODIUM SERPL-SCNC: 139 MMOL/L (ref 133–144)
SODIUM SERPL-SCNC: 141 MMOL/L (ref 133–144)
SODIUM SERPL-SCNC: 141 MMOL/L (ref 133–144)
SODIUM UR-SCNC: 12 MMOL/L
WBC # BLD AUTO: 10.6 10E9/L (ref 4–11)

## 2018-05-22 PROCEDURE — 25000128 H RX IP 250 OP 636: Performed by: INTERNAL MEDICINE

## 2018-05-22 PROCEDURE — 82570 ASSAY OF URINE CREATININE: CPT | Performed by: HOSPITALIST

## 2018-05-22 PROCEDURE — 85027 COMPLETE CBC AUTOMATED: CPT | Performed by: HOSPITALIST

## 2018-05-22 PROCEDURE — 25000125 ZZHC RX 250: Performed by: INTERNAL MEDICINE

## 2018-05-22 PROCEDURE — 36415 COLL VENOUS BLD VENIPUNCTURE: CPT | Performed by: HOSPITALIST

## 2018-05-22 PROCEDURE — 80076 HEPATIC FUNCTION PANEL: CPT | Performed by: HOSPITALIST

## 2018-05-22 PROCEDURE — C1769 GUIDE WIRE: HCPCS

## 2018-05-22 PROCEDURE — 25000128 H RX IP 250 OP 636: Performed by: HOSPITALIST

## 2018-05-22 PROCEDURE — 74176 CT ABD & PELVIS W/O CONTRAST: CPT

## 2018-05-22 PROCEDURE — 83735 ASSAY OF MAGNESIUM: CPT | Performed by: HOSPITALIST

## 2018-05-22 PROCEDURE — 0T9130Z DRAINAGE OF LEFT KIDNEY WITH DRAINAGE DEVICE, PERCUTANEOUS APPROACH: ICD-10-PCS | Performed by: RADIOLOGY

## 2018-05-22 PROCEDURE — 80048 BASIC METABOLIC PNL TOTAL CA: CPT | Performed by: HOSPITALIST

## 2018-05-22 PROCEDURE — 12000000 ZZH R&B MED SURG/OB

## 2018-05-22 PROCEDURE — 25000128 H RX IP 250 OP 636: Performed by: RADIOLOGY

## 2018-05-22 PROCEDURE — 27210905 ZZH KIT CR7

## 2018-05-22 PROCEDURE — 25000125 ZZHC RX 250

## 2018-05-22 PROCEDURE — 25000128 H RX IP 250 OP 636

## 2018-05-22 PROCEDURE — A9270 NON-COVERED ITEM OR SERVICE: HCPCS | Mod: GY | Performed by: HOSPITALIST

## 2018-05-22 PROCEDURE — 84300 ASSAY OF URINE SODIUM: CPT | Performed by: HOSPITALIST

## 2018-05-22 PROCEDURE — 0T9030Z DRAINAGE OF RIGHT KIDNEY WITH DRAINAGE DEVICE, PERCUTANEOUS APPROACH: ICD-10-PCS | Performed by: RADIOLOGY

## 2018-05-22 PROCEDURE — 50432 PLMT NEPHROSTOMY CATHETER: CPT | Mod: 50

## 2018-05-22 PROCEDURE — 25000132 ZZH RX MED GY IP 250 OP 250 PS 637: Mod: GY | Performed by: HOSPITALIST

## 2018-05-22 PROCEDURE — 99233 SBSQ HOSP IP/OBS HIGH 50: CPT | Performed by: HOSPITALIST

## 2018-05-22 RX ORDER — FENTANYL CITRATE 50 UG/ML
INJECTION, SOLUTION INTRAMUSCULAR; INTRAVENOUS
Status: COMPLETED
Start: 2018-05-22 | End: 2018-05-22

## 2018-05-22 RX ORDER — LIDOCAINE HYDROCHLORIDE 10 MG/ML
1-30 INJECTION, SOLUTION EPIDURAL; INFILTRATION; INTRACAUDAL; PERINEURAL
Status: DISCONTINUED | OUTPATIENT
Start: 2018-05-22 | End: 2018-05-22

## 2018-05-22 RX ORDER — QUETIAPINE FUMARATE 25 MG/1
25-50 TABLET, FILM COATED ORAL EVERY 6 HOURS PRN
Status: DISCONTINUED | OUTPATIENT
Start: 2018-05-22 | End: 2018-05-27 | Stop reason: HOSPADM

## 2018-05-22 RX ORDER — FLUMAZENIL 0.1 MG/ML
0.2 INJECTION, SOLUTION INTRAVENOUS
Status: DISCONTINUED | OUTPATIENT
Start: 2018-05-22 | End: 2018-05-22

## 2018-05-22 RX ORDER — CARVEDILOL 3.12 MG/1
3.12 TABLET ORAL 2 TIMES DAILY WITH MEALS
Status: DISCONTINUED | OUTPATIENT
Start: 2018-05-22 | End: 2018-05-27 | Stop reason: HOSPADM

## 2018-05-22 RX ORDER — GABAPENTIN 600 MG/1
600 TABLET ORAL 2 TIMES DAILY
Status: DISCONTINUED | OUTPATIENT
Start: 2018-05-22 | End: 2018-05-25

## 2018-05-22 RX ORDER — DIAZEPAM 5 MG
10 TABLET ORAL EVERY 30 MIN PRN
Status: DISCONTINUED | OUTPATIENT
Start: 2018-05-22 | End: 2018-05-26

## 2018-05-22 RX ORDER — FENTANYL CITRATE 50 UG/ML
25-50 INJECTION, SOLUTION INTRAMUSCULAR; INTRAVENOUS EVERY 5 MIN PRN
Status: DISCONTINUED | OUTPATIENT
Start: 2018-05-22 | End: 2018-05-22

## 2018-05-22 RX ORDER — CLONIDINE HYDROCHLORIDE 0.1 MG/1
0.1 TABLET ORAL 2 TIMES DAILY
Status: DISCONTINUED | OUTPATIENT
Start: 2018-05-22 | End: 2018-05-22

## 2018-05-22 RX ORDER — NALOXONE HYDROCHLORIDE 0.4 MG/ML
.1-.4 INJECTION, SOLUTION INTRAMUSCULAR; INTRAVENOUS; SUBCUTANEOUS
Status: DISCONTINUED | OUTPATIENT
Start: 2018-05-22 | End: 2018-05-22

## 2018-05-22 RX ORDER — LIDOCAINE HYDROCHLORIDE 10 MG/ML
INJECTION, SOLUTION INFILTRATION; PERINEURAL
Status: COMPLETED
Start: 2018-05-22 | End: 2018-05-22

## 2018-05-22 RX ORDER — DIAZEPAM 10 MG/2ML
5-10 INJECTION, SOLUTION INTRAMUSCULAR; INTRAVENOUS EVERY 30 MIN PRN
Status: DISCONTINUED | OUTPATIENT
Start: 2018-05-22 | End: 2018-05-26

## 2018-05-22 RX ADMIN — SODIUM CHLORIDE: 9 INJECTION, SOLUTION INTRAVENOUS at 22:55

## 2018-05-22 RX ADMIN — SODIUM CHLORIDE 1000 ML: 9 INJECTION, SOLUTION INTRAVENOUS at 11:07

## 2018-05-22 RX ADMIN — FENTANYL CITRATE 50 MCG: 50 INJECTION, SOLUTION INTRAMUSCULAR; INTRAVENOUS at 19:38

## 2018-05-22 RX ADMIN — ACETAMINOPHEN 650 MG: 325 TABLET ORAL at 00:27

## 2018-05-22 RX ADMIN — FOLIC ACID: 5 INJECTION, SOLUTION INTRAMUSCULAR; INTRAVENOUS; SUBCUTANEOUS at 06:37

## 2018-05-22 RX ADMIN — MIDAZOLAM HYDROCHLORIDE 1 MG: 1 INJECTION, SOLUTION INTRAMUSCULAR; INTRAVENOUS at 20:07

## 2018-05-22 RX ADMIN — Medication 5 MG: at 06:37

## 2018-05-22 RX ADMIN — SODIUM CHLORIDE: 9 INJECTION, SOLUTION INTRAVENOUS at 05:10

## 2018-05-22 RX ADMIN — FENTANYL CITRATE 50 MCG: 50 INJECTION, SOLUTION INTRAMUSCULAR; INTRAVENOUS at 19:50

## 2018-05-22 RX ADMIN — LIDOCAINE HYDROCHLORIDE 15 ML: 10 INJECTION, SOLUTION EPIDURAL; INFILTRATION; INTRACAUDAL; PERINEURAL at 20:27

## 2018-05-22 RX ADMIN — ACETAMINOPHEN 650 MG: 325 TABLET ORAL at 05:29

## 2018-05-22 RX ADMIN — ACETAMINOPHEN 650 MG: 325 TABLET ORAL at 22:55

## 2018-05-22 RX ADMIN — MIDAZOLAM HYDROCHLORIDE 1 MG: 1 INJECTION, SOLUTION INTRAMUSCULAR; INTRAVENOUS at 19:38

## 2018-05-22 RX ADMIN — FENTANYL CITRATE 50 MCG: 50 INJECTION, SOLUTION INTRAMUSCULAR; INTRAVENOUS at 20:07

## 2018-05-22 RX ADMIN — MIDAZOLAM HYDROCHLORIDE 1 MG: 1 INJECTION, SOLUTION INTRAMUSCULAR; INTRAVENOUS at 19:50

## 2018-05-22 RX ADMIN — GABAPENTIN 600 MG: 600 TABLET, FILM COATED ORAL at 22:06

## 2018-05-22 RX ADMIN — GABAPENTIN 600 MG: 600 TABLET, FILM COATED ORAL at 10:48

## 2018-05-22 RX ADMIN — LIDOCAINE HYDROCHLORIDE 15 ML: 10 INJECTION, SOLUTION INFILTRATION; PERINEURAL at 20:27

## 2018-05-22 RX ADMIN — ONDANSETRON 4 MG: 2 INJECTION INTRAMUSCULAR; INTRAVENOUS at 05:06

## 2018-05-22 RX ADMIN — CEFTRIAXONE 1 G: 1 INJECTION, POWDER, FOR SOLUTION INTRAMUSCULAR; INTRAVENOUS at 19:38

## 2018-05-22 NOTE — PHARMACY-ADMISSION MEDICATION HISTORY
Admission medication history interview status for the 5/21/2018  admission is complete. See EPIC admission navigator for prior to admission medications     Medication history source reliability:Good    Actions taken by pharmacist (provider contacted, etc):None     Additional medication history information not noted on PTA med list :  Meds Added: Hydromorphone    Medication reconciliation/reorder completed by provider prior to medication history? No    Time spent in this activity: 15 minutes    Prior to Admission medications    Medication Sig Last Dose Taking? Auth Provider   carvedilol (COREG) 3.125 MG tablet Take 1 tablet (3.125 mg) by mouth 2 times daily (with meals) 5/20/2018 at PM Yes Main Hardy MD   fluticasone (FLONASE) 50 MCG/ACT spray Spray 2 sprays into both nostrils daily 5/20/2018 at PM Yes Reported, Patient   GABAPENTIN PO Take 600 mg by mouth 2 times daily 5/20/2018 at PM Yes Reported, Patient   HYDROMORPHONE HCL PO Take 2-4 mg by mouth every 4 hours as needed for moderate to severe pain Past Week at Unknown time Yes Unknown, Entered By History   torsemide (DEMADEX) 20 MG tablet Take 20 mg by mouth daily 5/20/2018 at AM Yes Unknown, Entered By History   ASPIRIN NOT PRESCRIBED (INTENTIONAL) Please choose reason not prescribed, below   Main Hardy MD

## 2018-05-22 NOTE — PROVIDER NOTIFICATION
MD Notification    Notified Person: MD    Notified Person Name: Anna    Notification Date/Time:5/22/2018 at 1023    Notification Interaction: Text paged    Purpose of Notification: Positive blood cultures from 5/21 have grown gram negative rods.      Orders Received:    Comments:

## 2018-05-22 NOTE — PROGRESS NOTES
[facility]    Sepsis Evaluation Progress Note    Date of Service: 05/21/2018    I was called to see Antonio KINGS Andersonsolange due to abnormal vital signs triggering the Sepsis SIRS screening alert. He is known to have an infection.     Physical Exam    Vital Signs:  Temp: 102.7  F (39.3  C) Temp src: Oral BP: 134/73   Heart Rate: 92 Resp: 16 SpO2: 93 %        Lab:  Lactic Acid   Date Value Ref Range Status   05/21/2018 1.0 0.7 - 2.0 mmol/L Final       The patient is at baseline mental status.    The rest of their physical exam is significant for clear lungs.    Assessment and Plan    The SIRS and exam findings are likely due to   sepsis.     ID: The patient is currently on the following antibiotics:  Anti-infectives (Future)    Start     Dose/Rate Route Frequency Ordered Stop    05/21/18 2200  cefTRIAXone (ROCEPHIN) 1 g vial to attach to  mL bag for ADULTS or NS 50 mL bag for PEDS      1 g  over 15-30 Minutes Intravenous EVERY 24 HOURS 05/21/18 2051          Current antibiotic coverage is appropriate for source of infection.    Fluid: Fluid bolus ordered.    Lab: Repeat lactic acid ordered for 2 hours from now.    Disposition: The patient will remain on the current unit. We will continue to monitor this patient closely.    Solomon Franklin MD

## 2018-05-22 NOTE — PROVIDER NOTIFICATION
MD Notification    Notified Person: MD    Notified Person Name: Dr. Castillo     Notification Date/Time: 5/22/2018 5:08 PM     Notification Interaction: phone    Purpose of Notification: positive BC from 5/21 at 1745; gram negative rods. Also paged that CT results back    Orders Received: awaiting return call     Comments:

## 2018-05-22 NOTE — ED NOTES
"[facility]  ED Nurse Handoff Report    ED Chief complaint: Generalized Weakness (increased weakness that started yesterday )      ED Diagnosis:   Final diagnoses:   Severe sepsis (H)   Urinary tract infection without hematuria, site unspecified   Acute renal failure, unspecified acute renal failure type (H)   Thrombocytopenia (H)   Hyperbilirubinemia   Generalized weakness       Code Status: Full Code    Allergies:   Allergies   Allergen Reactions     Hmg-Coa-R Inhibitors Other (See Comments)     Rhabdomyolysis occurred within a couple days     Ciprofloxacin Itching     Severe itching \"like ants were crawling\"       Activity level - Baseline/Home:  Independent    Activity Level - Current:   Stand with Assist     Needed?: No    Isolation: No  Infection: Not Applicable    Bariatric?: No    Vital Signs:   Vitals:    05/21/18 1710 05/21/18 1711   BP: 134/73    Resp: 16    Temp: 102.7  F (39.3  C)    TempSrc: Oral    SpO2: 93%    Height:  1.854 m (6' 1\")       Cardiac Rhythm: ,        Pain level:      Is this patient confused?: No   Suicidality: Screened negative    Patient Report: Initial Complaint: Antonio Ramirez is a 73 year old male with a history of liver cirrhosis and colon cancer who presents to the emergency room for evaluation of generalized weakness. EMS reports that five weeks ago the patient was brought in after a fall which caused a brain bleed so he underwent surgery. His one month check up went well. Today, EMS was originally called for a lift assist but it progressed due to the patient being extremely lethargic which started yesterday. His wife reported that he took a long nap today and slept until 1530 which is very unusual. She also stated that two days ago he was walking fine but today he could not even stand. EMS reports his blood pressure was 133/88 and his sugar was 135. They also reported that the patient felt very warm and had a fever of 102.7 upon arrival. EMS reports " a slight eye droop but otherwise no stroke symptoms. Previously, the patient was on blood thinners but has since been off of them after his surgery. He reports difficulty urinating. Awhile ago, the patient had a leg amputation due to an infection. The patient denies a history of diabetes. No symptoms of infection. No nausea, vomiting, or abdominal pain. No worsening headache. No pain or blood with urination. No sore throat, runny nose, or other cold symptoms. He has not eaten or drank anything today.      Of note, the patient had a UTI in April 2018 that grew out Klebsiella which is sensitive to Zosyn  Focused Assessment: Resp:WNL Cardiac: WNL SR on monitor. Neuro: WNL Musculoskeletal: Increased weakness, unable to get up after falling on own  Tests Performed: basic labs, lactic, cxr, ua, blood cultures  Abnormal Results:   Results for orders placed or performed during the hospital encounter of 05/21/18 (from the past 24 hour(s))   CBC with platelets differential   Result Value Ref Range    WBC 7.0 4.0 - 11.0 10e9/L    RBC Count 4.13 (L) 4.4 - 5.9 10e12/L    Hemoglobin 11.0 (L) 13.3 - 17.7 g/dL    Hematocrit 34.3 (L) 40.0 - 53.0 %    MCV 83 78 - 100 fl    MCH 26.6 26.5 - 33.0 pg    MCHC 32.1 31.5 - 36.5 g/dL    RDW 18.4 (H) 10.0 - 15.0 %    Platelet Count 119 (L) 150 - 450 10e9/L    Diff Method Automated Method     % Neutrophils 81.3 %    % Lymphocytes 7.3 %    % Monocytes 10.6 %    % Eosinophils 0.0 %    % Basophils 0.7 %    % Immature Granulocytes 0.1 %    Nucleated RBCs 0 0 /100    Absolute Neutrophil 5.6 1.6 - 8.3 10e9/L    Absolute Lymphocytes 0.5 (L) 0.8 - 5.3 10e9/L    Absolute Monocytes 0.7 0.0 - 1.3 10e9/L    Absolute Eosinophils 0.0 0.0 - 0.7 10e9/L    Absolute Basophils 0.1 0.0 - 0.2 10e9/L    Abs Immature Granulocytes 0.0 0 - 0.4 10e9/L    Absolute Nucleated RBC 0.0    Comprehensive metabolic panel   Result Value Ref Range    Sodium 138 133 - 144 mmol/L    Potassium 4.0 3.4 - 5.3 mmol/L    Chloride 104  94 - 109 mmol/L    Carbon Dioxide 25 20 - 32 mmol/L    Anion Gap 9 3 - 14 mmol/L    Glucose 118 (H) 70 - 99 mg/dL    Urea Nitrogen 19 7 - 30 mg/dL    Creatinine 1.22 0.66 - 1.25 mg/dL    GFR Estimate 58 (L) >60 mL/min/1.7m2    GFR Estimate If Black 70 >60 mL/min/1.7m2    Calcium 9.0 8.5 - 10.1 mg/dL    Bilirubin Total 1.7 (H) 0.2 - 1.3 mg/dL    Albumin 3.2 (L) 3.4 - 5.0 g/dL    Protein Total 7.5 6.8 - 8.8 g/dL    Alkaline Phosphatase 105 40 - 150 U/L    ALT 12 0 - 70 U/L    AST 32 0 - 45 U/L   Lactic acid whole blood   Result Value Ref Range    Lactic Acid 2.2 (H) 0.7 - 2.0 mmol/L   Troponin I   Result Value Ref Range    Troponin I ES 0.027 0.000 - 0.045 ug/L   EKG 12 lead   Result Value Ref Range    Interpretation ECG Click View Image link to view waveform and result    UA with Microscopic   Result Value Ref Range    Color Urine Yellow     Appearance Urine Slightly Cloudy     Glucose Urine Negative NEG^Negative mg/dL    Bilirubin Urine Negative NEG^Negative    Ketones Urine Negative NEG^Negative mg/dL    Specific Gravity Urine 1.014 1.003 - 1.035    Blood Urine Large (A) NEG^Negative    pH Urine 7.0 5.0 - 7.0 pH    Protein Albumin Urine 100 (A) NEG^Negative mg/dL    Urobilinogen mg/dL 2.0 0.0 - 2.0 mg/dL    Nitrite Urine Negative NEG^Negative    Leukocyte Esterase Urine Large (A) NEG^Negative    Source Midstream Urine     WBC Urine >182 (H) 0 - 5 /HPF    RBC Urine 117 (H) 0 - 2 /HPF    WBC Clumps Present (A) NEG^Negative /HPF    Bacteria Urine Many (A) NEG^Negative /HPF    Squamous Epithelial /HPF Urine 1 0 - 1 /HPF    Mucous Urine Present (A) NEG^Negative /LPF   Chest XR,  PA & LAT    Narrative    CHEST TWO VIEWS   5/21/2018 6:05 PM     HISTORY: Fever.    COMPARISON: 4/7/2018.    FINDINGS: Blunting of the right costophrenic angle and hazy opacity in  the posterior aspect of the lower right lung, best visualized on the  lateral projection image. The lungs are otherwise clear. Borderline  cardiomegaly.  Atherosclerotic calcification in the thoracic aorta.  Prior median sternotomy.      Impression    IMPRESSION:   1. Blunting of the right costophrenic angle, suggestive of a small  right pleural effusion.  2. A small hazy opacity in the posterior aspect of the lower right  lung is nonspecific, but pneumonia is possible. A follow-up chest  radiograph would be useful in one month to confirm resolution of this  finding.   Lactic acid   Result Value Ref Range    Lactic Acid 1.0 0.7 - 2.0 mmol/L       Treatments provided: antibiotics, tylenol, fluids    Family Comments: wife at bedside    OBS brochure/video discussed/provided to patient: N/A    ED Medications:   Medications   sodium chloride 0.9% infusion (not administered)   0.9% sodium chloride BOLUS (2,871 mLs Intravenous New Bag 5/21/18 1742)   acetaminophen (TYLENOL) tablet 1,000 mg (1,000 mg Oral Given 5/21/18 1740)   piperacillin-tazobactam (ZOSYN) 4.5 g vial to attach to  mL bag (0 g Intravenous Stopped 5/21/18 1838)       Drips infusing?:  Yes    For the majority of the shift this patient was Green.   Interventions performed were none.    Severe Sepsis OR Septic Shock Diagnosis Present: No      ED NURSE PHONE NUMBER: [phone]

## 2018-05-22 NOTE — PROGRESS NOTES
Cross cover  Concern of DT, patient  Has fever as well, has hallucinations   Will start ciwa PROTOCOL.

## 2018-05-22 NOTE — H&P
[facility]    History and Physical  Hospitalist       Date of Admission:  5/21/2018    Assessment & Plan   Antonio Ramirez is a 73 year old male with complex past history recent traumatic subdural hematoma (4/7/18), alcoholic cirrhosis, chronic kidney disease, hypertension, CAD, atrial fibrillation, CHF among other chronic medical issues who presents with generalized weakness and found to have UTI.    UTI with possible early sepsis: Presents with fever and a lactate of 2.2 but no other SIRS criteria.  Abnormal UA with pyuria and leukocyte esterase.  Received zosyn empirically in ED due to sepsis.  - switch to ceftriaxone  - follow blood and urine cultures  -  ml/hr; monitor fluid status closely given history of CKD, CHF and cirrhosis    Alcoholic cirrhosis: Continues to drink daily.  Bilirubin mildly elevated LFTs otherwise within normal limits.  - low salt diet    CAD status post CABG ×3 in 2007  Atrial fibrillation  Hypertension  TTE in January 2018 shows ejection fraction 40-45%, regional wall motion abnormalities, moderately dilated RV with decreased RV systolic function, moderate MR, mild AS and pulmonary hypertension.  - Continue carvedilol with hold parameters, hold diuretic in favor of IV fluids    Chronic kidney disease stage II: Baseline creatinine 0.9-1.0.  Admission Cr 1.22.  - BMP in AM    Chronic anemia and from cytopenia: Suspect due to cirrhosis.  Baseline hemoglobin approximately 10-11 g/dL, baseline plt 130-150.  - hgb stable, platelets slightly below baseline, monitor    Traumatic subdural hematoma:  Healing well, he has been cleared to restart anticoagulation it is awaiting clearance from his outpatient oncologist.    History of DVT PE status post IVC filter, anticardiolipin antibody:  Anticoagulation as noted above.      DVT Prophylaxis: Pneumatic Compression Devices  Code Status: Full Code    # Pain Assessment:  Current Pain Score 4/20/2018   Patient currently in pain?  sleeping: patient not able to self report   Pain score (0-10) -   Pain location -   Pain descriptors -   CPOT pain score -   Antonio mendez pain level was assessed and he currently denies pain.          Disposition: Expected discharge in 3 days once sepsis resolved and culture results finalized with antibiotic plan in place.    Solomon Franklin    Primary Care Physician   Main Hardy    Chief Complaint   weakness    History is obtained from the patient and chart reivew    History of Present Illness   Antonio Ramirez is a 73 year old male who presents with generalized weakness.  Patient reports he was unable to stand up today secondary to weakness which has been progressive since Sunday.  He denies any fevers or chills, nausea, lightheadedness, shortness of breath, cough, rash or sores, headache, sore throat or dysuria.  He does endorse some urinary urgency but denies any frequency.  He denies any chest pain or pressure or palpitations.  His remainder review of systems is negative.    Past Medical History    Traumatic subdural hematoma  Alcoholic cirrhosis  Chronic thrombocytopenia  Chronic anemia  Chronic kidney disease stage II  Prostate cancer status post radiation seed placement  History of DVT/PE status post IVC filter  Monoclonal gammopathy  Mitral regurgitation  Aortic stenosis  B-cell lymphoproliferative disorder  Hypertension  CAD status post CABG ×3  GERD  Anticardiolipin antibody  Atrial fibrillation  Thoracic aortic aneurysm  Congestive heart failure    Past Surgical History   Left lower extremity amputation, complicated by abscess  Right total hip arthroplasty  Right total knee arthroplasty  CABG in 2007  Cataract surgery  Craniotomy cholecystectomy  Appendectomy    Prior to Admission Medications   Prior to Admission Medications   Prescriptions Last Dose Informant Patient Reported? Taking?   ASPIRIN NOT PRESCRIBED (INTENTIONAL)   Yes No   Sig: Please choose reason not prescribed, below   GABAPENTIN PO   Yes No  "  Sig: Take 600 mg by mouth 2 times daily   carvedilol (COREG) 3.125 MG tablet   Yes No   Sig: Take 1 tablet (3.125 mg) by mouth 2 times daily (with meals)   fluticasone (FLONASE) 50 MCG/ACT spray   Yes No   Sig: Spray 2 sprays into both nostrils daily   levETIRAcetam (KEPPRA) 500 MG tablet   No No   Sig: Take 1 tablet (500 mg) by mouth 2 times daily   torsemide (DEMADEX) 20 MG tablet   Yes No   Sig: Take 20 mg by mouth daily      Facility-Administered Medications: None     Allergies   Allergies   Allergen Reactions     Hmg-Coa-R Inhibitors Other (See Comments)     Rhabdomyolysis occurred within a couple days     Ciprofloxacin Itching     Severe itching \"like ants were crawling\"       Social History   I have personally reviewed the social history with the patient showing he drinks 4-5 ounces of alcohol nightly.  He denies any tobacco or illicit drug use..    Family History   Denies any significant family history.    Review of Systems   The 10 point Review of Systems is negative other than noted in the HPI or here.     Physical Exam   Temp: 102.7  F (39.3  C) Temp src: Oral BP: 134/73   Heart Rate: 92 Resp: 16 SpO2: 93 %      Vital Signs with Ranges  Temp:  [102.7  F (39.3  C)] 102.7  F (39.3  C)  Heart Rate:  [92] 92  Resp:  [16] 16  BP: (134)/(73) 134/73  SpO2:  [93 %] 93 %  0 lbs 0 oz    Constitutional: well developed, well nourished male in no acute distress  Eyes: pupils equal, round and reactive to light, no icterus  HEENT: head normocephalic, atraumatic, mucous membranes moist, no cervical lymphadenopathy  Respiratory: lungs clear to auscultation bilaterally, no crackles or wheezes, no tachypnea  Cardiovascular: regular rate and rhythm, normal S1/S2, no murmur, rubs or gallops  GI: abdomen soft, non-tender, non-distended, normal bowel sounds, no hepatosplenomegaly  Lymph/Hematologic: No peripheral edema  Skin: No rash or bruising  Musculoskeletal: left lower extremity amputation, currently wearing " prothesis  Neurologic: alert and appropriate, cranial nerves grossly intact  Psychiatric: normal affect    Data   Data reviewed today:  I personally reviewed the chest x-ray image(s) showing mild right lower lobe opacity.    Recent Labs  Lab 05/21/18  1719 05/18/18  1641   WBC 7.0 4.4   HGB 11.0* 10.6*   MCV 83 86   * 168    140   POTASSIUM 4.0 4.3   CHLORIDE 104 107   CO2 25 28   BUN 19 16   CR 1.22 1.00   ANIONGAP 9 5   BENNIE 9.0 9.7   * 89   ALBUMIN 3.2*  --    PROTTOTAL 7.5  --    BILITOTAL 1.7*  --    ALKPHOS 105  --    ALT 12  --    AST 32  --    TROPI 0.027  --        Recent Results (from the past 24 hour(s))   Chest XR,  PA & LAT    Narrative    CHEST TWO VIEWS   5/21/2018 6:05 PM     HISTORY: Fever.    COMPARISON: 4/7/2018.    FINDINGS: Blunting of the right costophrenic angle and hazy opacity in  the posterior aspect of the lower right lung, best visualized on the  lateral projection image. The lungs are otherwise clear. Borderline  cardiomegaly. Atherosclerotic calcification in the thoracic aorta.  Prior median sternotomy.      Impression    IMPRESSION:   1. Blunting of the right costophrenic angle, suggestive of a small  right pleural effusion.  2. A small hazy opacity in the posterior aspect of the lower right  lung is nonspecific, but pneumonia is possible. A follow-up chest  radiograph would be useful in one month to confirm resolution of this  finding.

## 2018-05-22 NOTE — PROGRESS NOTES
RECEIVING UNIT ED HANDOFF REVIEW    ED Nurse Handoff Report was reviewed by: Jeannette Verma on May 21, 2018 at 7:37 PM

## 2018-05-22 NOTE — PROVIDER NOTIFICATION
Paged on call hospitalist re: pt daily drinker with long hx of drinking, tremulous, hallucinating, nausea. Dr. Ramos put in [facility] protocol orders.

## 2018-05-22 NOTE — PROGRESS NOTES
"Clinic Care Coordination Contact  Chart Review    Data: Per chart review, patient discharged from [facility] on 4/20/18.    Per chart review, patient was admitted to [facility] yesterday, 5/21/18.  The H&P documentation states (in part): \"Antonio Ramirez is a 73 year old male with complex past history recent traumatic subdural hematoma (4/7/18), alcoholic cirrhosis, chronic kidney disease, hypertension, CAD, atrial fibrillation, CHF among other chronic medical issues who presents with generalized weakness and found to have UTI.\"    Plan: -[facility] will follow for possible need for community resources post hospitalization.    TREMAYNE Torrez  [facility] Care Coordination  Clinic: [facility]   Email: [email or web address]  Tele: [phone]            "

## 2018-05-22 NOTE — PROGRESS NOTES
Hospitalist brief update note:    - Cr continues to worsen, CT result came back and has bilateral ureteric stones with hydronephrosis. BP remains soft, and on aggressive IV hydration. Urone output also low. Blood culture GNR.  - I called Dr Hunter from urology who recommended consulting IR for perc nephrostomy tube. Then I discussed with Dr Pittman from IR, order placed for nephrostomy tube placement.   - Will consult urology as well.    Alejandra Castillo MD  Hospitalist

## 2018-05-22 NOTE — PROGRESS NOTES
[facility]    Hospitalist Progress Note  Date of Service (when I saw the patient): 05/22/2018    Assessment & Plan   Antonio Ramirez is a 73 year old male with complex past history recent traumatic subdural hematoma (4/7/18), alcoholic cirrhosis, chronic kidney disease, hypertension, CAD, atrial fibrillation, CHF among other chronic medical issues who presented with generalized weakness and found to have UTI.     UTI with sepsis: Presented with fever and a lactate of 2.2 but no other SIRS criteria.  Abnormal UA with pyuria and leukocyte esterase.  Received zosyn empirically in ED due to sepsis.  - switched to ceftriaxone on admission  - blood culture 1/2 GNR, follow identification and sensitivity,  follow urine culture, and temp trend.  - BP soft, 1L NS bolus now and closely monitor.  -  ml/hr; monitor fluid status closely given history of CKD, CHF and cirrhosis     Acute kidney injury on CKD stage II: Baseline creatinine 0.9-1.0.  Admission Cr 1.22.  - Cr worsened to 1.69  - suspect due to sepsis.  - Bolus NS, followed by maintenance IVF. Monitor BP closely.   - FENa, bladder scan, strict I&O.  - CT abd non contrast to r/o any obstruction.  - repeat BMP later today and in am.     Alcoholic cirrhosis  Alcohol abuse with withdrawal: Continues to drink daily.  Bilirubin mildly elevated LFTs otherwise within normal limits.  - low salt diet  - CIWA protocol initiated, received Valium this am.  - possibly have small ascites on clinical exam, non tender, no concern for SBP at this time.     CAD status post CABG ×3 in 2007  Atrial fibrillation  Hypertension, now with hypotension.  TTE in January 2018 shows ejection fraction 40-45%, regional wall motion abnormalities, moderately dilated RV with decreased RV systolic function, moderate MR, mild AS and pulmonary hypertension.  - Continue carvedilol with hold parameters, hold diuretic in favor of IV fluids until BP stable, and Cr improves.  - dc  clonidine until BP stable/better     Chronic anemia and from cytopenia: Suspect due to cirrhosis.  Baseline hemoglobin approximately 10-11 g/dL, baseline plt 130-150.  - hgb stable, platelets slightly below baseline, and worsened due to sepsis.  - monitor     Traumatic subdural hematoma:  Healing well, he has been cleared to restart anticoagulation it is awaiting clearance from his outpatient oncologist.     History of DVT PE status post IVC filter, anticardiolipin antibody:  Anticoagulation as noted above.     DVT Prophylaxis: Pneumatic Compression Devices  Code Status: Full Code    Disposition: Expected discharge in 2 days once culture and sensitivity finial, afebrile and plan for abx made. Discussed with patient and RN, I called his spouse, unable to reach.    Alejandra Castillo MD  Hospitalist    Interval History   Patient spiked a fever of 102.7 after admission, tachycardic and diaphoresis as well. Unclear if all of this due to fever vs withdrawal. Received valium, and is sleepy. Also BP soft 87/47.  - denies abdominal/flank pain, denies BPH symptoms.   - no cough, dyspnea, chest pain, headache.  - feels tired/weak in general.    -Data reviewed today: I reviewed all new labs and imaging results over the last 24 hours. I personally reviewed no images or EKG's today.    Physical Exam   Temp: 97.8  F (36.6  C) Temp src: Axillary BP: (!) 87/47   Heart Rate: 99 Resp: 20 SpO2: 91 % O2 Device: None (Room air)    Vitals:    05/21/18 2100   Weight: 96.2 kg (212 lb)     Vital Signs with Ranges  Temp:  [97.8  F (36.6  C)-102.7  F (39.3  C)] 97.8  F (36.6  C)  Heart Rate:  [73-99] 99  Resp:  [16-20] 20  BP: ()/(47-99) 87/47  SpO2:  [91 %-95 %] 91 %  I/O last 3 completed shifts:  In: -   Out: 200 [Urine:200]    Constitutional: Sleepy but arouses appropriately though fals back to sleep quickly. Not in distress.  HEENT: PERRLA EOMI, healed scar on the Rt parietal scalp   Respiratory: Bilateral equal air entry, clear to  auscultation. No respiratory distress.  Cardiovascular: Regular s1s2, no murmur, rub or gallop. No tachycardia.  GI: Soft, minimally distended, non tender, bowel tones active.  Skin/Integumen: No rash, no blister.  MSK: Lt BKA  Neuro:  Sleepy but arouses appropriately, follows verbal commands appropriately, CN 2-12 intact.    Medications     sodium chloride 125 mL/hr at 05/22/18 0510       sodium chloride 0.9%  1,000 mL Intravenous Once     carvedilol  3.125 mg Oral BID w/meals     cefTRIAXone  1 g Intravenous Q24H     cloNIDine  0.1 mg Oral BID     gabapentin (NEURONTIN) tablet 600 mg  600 mg Oral BID     sodium chloride (PF)  3 mL Intracatheter Q8H     IV Fluid with vitamins   Intravenous Q24H       Data     Recent Labs  Lab 05/22/18  0737 05/21/18  1719 05/18/18  1641   WBC 10.6 7.0 4.4   HGB 10.0* 11.0* 10.6*   MCV 84 83 86   PLT 80* 119* 168    138 140   POTASSIUM 3.9 4.0 4.3   CHLORIDE 109 104 107   CO2 22 25 28   BUN 23 19 16   CR 1.69* 1.22 1.00   ANIONGAP 8 9 5   BENNIE 8.1* 9.0 9.7   * 118* 89   ALBUMIN 2.8* 3.2*  --    PROTTOTAL 6.6* 7.5  --    BILITOTAL 1.5* 1.7*  --    ALKPHOS 89 105  --    ALT 16 12  --    AST 41 32  --    TROPI  --  0.027  --        Recent Results (from the past 24 hour(s))   Chest XR,  PA & LAT    Narrative    CHEST TWO VIEWS   5/21/2018 6:05 PM     HISTORY: Fever.    COMPARISON: 4/7/2018.    FINDINGS: A small hazy opacity in the posterior aspect of the lower  right lung, best visualized on the lateral projection image. Blunting  of the right costophrenic angle. The lungs are otherwise clear.  Borderline cardiomegaly. Atherosclerotic calcification in the thoracic  aorta. Prior median sternotomy.      Impression    IMPRESSION:   1. A small hazy opacity in the posterior aspect of the lower right  lung is nonspecific, but pneumonia is possible. A follow-up chest  radiograph would be useful in one month to confirm resolution of this  finding.  2. Blunting of the right  costophrenic angle, suggestive of a small  right pleural effusion.    RONALD ALMEIDA MD

## 2018-05-23 LAB
ANION GAP SERPL CALCULATED.3IONS-SCNC: 7 MMOL/L (ref 3–14)
ANION GAP SERPL CALCULATED.3IONS-SCNC: 8 MMOL/L (ref 3–14)
BASOPHILS # BLD AUTO: 0 10E9/L (ref 0–0.2)
BASOPHILS NFR BLD AUTO: 0.6 %
BUN SERPL-MCNC: 31 MG/DL (ref 7–30)
BUN SERPL-MCNC: 31 MG/DL (ref 7–30)
CALCIUM SERPL-MCNC: 7.4 MG/DL (ref 8.5–10.1)
CALCIUM SERPL-MCNC: 7.6 MG/DL (ref 8.5–10.1)
CHLORIDE SERPL-SCNC: 106 MMOL/L (ref 94–109)
CHLORIDE SERPL-SCNC: 109 MMOL/L (ref 94–109)
CO2 SERPL-SCNC: 22 MMOL/L (ref 20–32)
CO2 SERPL-SCNC: 23 MMOL/L (ref 20–32)
CREAT SERPL-MCNC: 1.7 MG/DL (ref 0.66–1.25)
CREAT SERPL-MCNC: 1.75 MG/DL (ref 0.66–1.25)
DIFFERENTIAL METHOD BLD: ABNORMAL
EOSINOPHIL # BLD AUTO: 0 10E9/L (ref 0–0.7)
EOSINOPHIL NFR BLD AUTO: 0.4 %
ERYTHROCYTE [DISTWIDTH] IN BLOOD BY AUTOMATED COUNT: 19.2 % (ref 10–15)
GFR SERPL CREATININE-BSD FRML MDRD: 38 ML/MIN/1.7M2
GFR SERPL CREATININE-BSD FRML MDRD: 39 ML/MIN/1.7M2
GLUCOSE SERPL-MCNC: 99 MG/DL (ref 70–99)
GLUCOSE SERPL-MCNC: 99 MG/DL (ref 70–99)
HCT VFR BLD AUTO: 30.5 % (ref 40–53)
HGB BLD-MCNC: 9.5 G/DL (ref 13.3–17.7)
IMM GRANULOCYTES # BLD: 0 10E9/L (ref 0–0.4)
IMM GRANULOCYTES NFR BLD: 0.3 %
LYMPHOCYTES # BLD AUTO: 0.5 10E9/L (ref 0.8–5.3)
LYMPHOCYTES NFR BLD AUTO: 7.5 %
MCH RBC QN AUTO: 26.5 PG (ref 26.5–33)
MCHC RBC AUTO-ENTMCNC: 31.1 G/DL (ref 31.5–36.5)
MCV RBC AUTO: 85 FL (ref 78–100)
MONOCYTES # BLD AUTO: 0.6 10E9/L (ref 0–1.3)
MONOCYTES NFR BLD AUTO: 9.1 %
NEUTROPHILS # BLD AUTO: 5.5 10E9/L (ref 1.6–8.3)
NEUTROPHILS NFR BLD AUTO: 82.1 %
NRBC # BLD AUTO: 0 10*3/UL
NRBC BLD AUTO-RTO: 0 /100
PLATELET # BLD AUTO: 83 10E9/L (ref 150–450)
POTASSIUM SERPL-SCNC: 4 MMOL/L (ref 3.4–5.3)
POTASSIUM SERPL-SCNC: 4.2 MMOL/L (ref 3.4–5.3)
RBC # BLD AUTO: 3.58 10E12/L (ref 4.4–5.9)
SODIUM SERPL-SCNC: 136 MMOL/L (ref 133–144)
SODIUM SERPL-SCNC: 139 MMOL/L (ref 133–144)
WBC # BLD AUTO: 6.7 10E9/L (ref 4–11)

## 2018-05-23 PROCEDURE — 12000000 ZZH R&B MED SURG/OB

## 2018-05-23 PROCEDURE — 36415 COLL VENOUS BLD VENIPUNCTURE: CPT | Performed by: HOSPITALIST

## 2018-05-23 PROCEDURE — 25000132 ZZH RX MED GY IP 250 OP 250 PS 637: Mod: GY | Performed by: HOSPITALIST

## 2018-05-23 PROCEDURE — 80048 BASIC METABOLIC PNL TOTAL CA: CPT | Performed by: HOSPITALIST

## 2018-05-23 PROCEDURE — 25000125 ZZHC RX 250: Performed by: INTERNAL MEDICINE

## 2018-05-23 PROCEDURE — A9270 NON-COVERED ITEM OR SERVICE: HCPCS | Mod: GY | Performed by: HOSPITALIST

## 2018-05-23 PROCEDURE — 0T783DZ DILATION OF BILATERAL URETERS WITH INTRALUMINAL DEVICE, PERCUTANEOUS APPROACH: ICD-10-PCS | Performed by: RADIOLOGY

## 2018-05-23 PROCEDURE — 85025 COMPLETE CBC W/AUTO DIFF WBC: CPT | Performed by: HOSPITALIST

## 2018-05-23 PROCEDURE — 25000128 H RX IP 250 OP 636: Performed by: HOSPITALIST

## 2018-05-23 PROCEDURE — 99233 SBSQ HOSP IP/OBS HIGH 50: CPT | Performed by: HOSPITALIST

## 2018-05-23 PROCEDURE — 25000128 H RX IP 250 OP 636: Performed by: INTERNAL MEDICINE

## 2018-05-23 RX ORDER — LANOLIN ALCOHOL/MO/W.PET/CERES
100 CREAM (GRAM) TOPICAL DAILY
Status: DISCONTINUED | OUTPATIENT
Start: 2018-05-23 | End: 2018-05-27 | Stop reason: HOSPADM

## 2018-05-23 RX ORDER — FOLIC ACID 1 MG/1
1 TABLET ORAL DAILY
Status: DISCONTINUED | OUTPATIENT
Start: 2018-05-23 | End: 2018-05-27 | Stop reason: HOSPADM

## 2018-05-23 RX ADMIN — SODIUM CHLORIDE 1000 ML: 9 INJECTION, SOLUTION INTRAVENOUS at 10:35

## 2018-05-23 RX ADMIN — OXYCODONE HYDROCHLORIDE 5 MG: 5 TABLET ORAL at 00:07

## 2018-05-23 RX ADMIN — GABAPENTIN 600 MG: 600 TABLET, FILM COATED ORAL at 09:39

## 2018-05-23 RX ADMIN — Medication 1 MG: at 21:53

## 2018-05-23 RX ADMIN — CEFTRIAXONE 1 G: 1 INJECTION, POWDER, FOR SOLUTION INTRAMUSCULAR; INTRAVENOUS at 21:45

## 2018-05-23 RX ADMIN — SODIUM CHLORIDE: 9 INJECTION, SOLUTION INTRAVENOUS at 03:30

## 2018-05-23 RX ADMIN — SODIUM CHLORIDE: 9 INJECTION, SOLUTION INTRAVENOUS at 21:53

## 2018-05-23 RX ADMIN — SODIUM CHLORIDE: 9 INJECTION, SOLUTION INTRAVENOUS at 12:56

## 2018-05-23 RX ADMIN — GABAPENTIN 600 MG: 600 TABLET, FILM COATED ORAL at 21:34

## 2018-05-23 RX ADMIN — FOLIC ACID: 5 INJECTION, SOLUTION INTRAMUSCULAR; INTRAVENOUS; SUBCUTANEOUS at 05:59

## 2018-05-23 RX ADMIN — Medication 1 MG: at 02:01

## 2018-05-23 NOTE — PROGRESS NOTES
[facility]    Hospitalist Progress Note  Date of Service (when I saw the patient): 05/23/2018    Assessment & Plan   Antonio Ramirez is a 73 year old male with complex past history recent traumatic subdural hematoma (4/7/18), alcoholic cirrhosis, chronic kidney disease, hypertension, CAD, atrial fibrillation, CHF among other chronic medical issues who presented with generalized weakness and found to have UTI.     Gram negative sepsis due to UTI  Presented with fever and a lactate of 2.2 but no other SIRS criteria.  Abnormal UA with pyuria and leukocyte esterase.  Received zosyn empirically in ED due to sepsis.  - switched to ceftriaxone on admission, and continued.  -Patient was hypotensive despite fluid challenge, CT abdomen pelvis was done which revealed bilateral ureteric stones with hydronephrosis, discussed with urology and IR, status post percutaneous nephrostomy tubes placement 5/22/18.  - blood culture as well as urine culture growing gram-negative lactose fermenting rods, follow identification and sensitivity  - Pending urology eval for management of the tubes and ureteric stones.     Acute kidney injury on CKD stage II due to obstruction  Bilateral ureteric stones with hydro-nephrosis:  Baseline creatinine 0.9-1.0.  Admission Cr 1.22.  - Cr worsened peaked at 1.85, received multiple IV fluid boluses, creatinine has slightly improved to 1.75 this a.m., expect it will continue to improve with the decompression now.  - suspect component of sepsis and prerenal as well given FENa less than 0.1  - Continue to monitor intake and output strictly.    - BP stable currently, but urine output is low and he appears dry, will give another liter of normal saline bolus and decrease IV hydration at 1 50 cc an hour.  - Urine output remains low, will ask IR to evaluate the patency of the tubes.  - repeat BMP later today and in am.   -Pain management with Tylenol and as needed oxycodone.  Remains narcotic as  able.    Alcoholic cirrhosis  Alcohol abuse with withdrawal: Continues to drink daily.  Bilirubin mildly elevated LFTs otherwise within normal limits.  - low salt diet  - CIWA protocol initiated, scoring low now, has not needed Valium overnight.  - possibly have small ascites on clinical exam, non tender, no concern for SBP at this time.  - Change banana back to p.o. multivitamins thiamine and folic acid.     CAD status post CABG ×3 in 2007  Atrial fibrillation  Hypertension, now with hypotension.  TTE in January 2018 shows ejection fraction 40-45%, regional wall motion abnormalities, moderately dilated RV with decreased RV systolic function, moderate MR, mild AS and pulmonary hypertension.  - Continue carvedilol with hold parameters, hold diuretic in favor of IV fluids until BP stable, and Cr improves.  - Continue to hold clonidine       Chronic anemia and from cytopenia: Suspect due to cirrhosis.  Baseline hemoglobin approximately 10-11 g/dL, baseline plt 130-150.  - hgb and platelets have worsened due to sepsis.  - He does have blood in his urine post procedure/tube placement, no active bleeding. Monitor     Traumatic subdural hematoma:  Healing well, he has been cleared to restart anticoagulation it is awaiting clearance from his outpatient oncologist.     History of DVT PE status post IVC filter, anticardiolipin antibody:  Anticoagulation as noted above.    # Pain Assessment:  Current Pain Score 5/23/2018   Patient currently in pain? yes   Pain score (0-10) 6   Pain location -   Pain descriptors -   CPOT pain score -   - Antonio is experiencing pain due to ureteric stones and nephrostomy tubes. Pain management was discussed and the plan was created in a collaborative fashion.  Antonio's response to the current recommendations: mixed response  - Please see the plan for pain management as documented above     DVT Prophylaxis: Pneumatic Compression Devices  Code Status: Full Code    Disposition: Expected discharge  2-3 days once culture and sensitivity finial, afebrile and plan for abx made. Discussed with patient and RN, I called his spouse Helena and updated her over the phone.    Alejandra Castillo MD  Hospitalist    Interval History    Patient was seen and evaluated, fever has subsided overnight, complains of mild-to-moderate pain at the percutaneous nephrostomy tubes sites bilaterally.  -Denies abdominal pain, nausea, diarrhea.  -Blood pressure has improved, still with low urine output.     -Data reviewed today: I reviewed all new labs and imaging results over the last 24 hours, CT abdomen pelvis report attached.      Physical Exam   Temp: 98  F (36.7  C) Temp src: Oral BP: 93/54   Heart Rate: 93 Resp: 16 SpO2: 95 % O2 Device: None (Room air)    Vitals:    05/21/18 2100 05/23/18 0620   Weight: 96.2 kg (212 lb) 99.1 kg (218 lb 6.4 oz)     Vital Signs with Ranges  Temp:  [95.6  F (35.3  C)-98  F (36.7  C)] 98  F (36.7  C)  Heart Rate:  [55-99] 93  Resp:  [8-26] 16  BP: ()/(47-87) 93/54  SpO2:  [91 %-100 %] 95 %  I/O last 3 completed shifts:  In: 3960 [P.O.:240; I.V.:3720]  Out: 325 [Urine:325]    Constitutional: Much more alert today, oriented ×3, does not appear to be in distress.  HEENT: PERRLA EOMI, healed scar on the Rt parietal scalp.  Mucosa appears dry.  Respiratory: Bilateral equal air entry, clear to auscultation. No respiratory distress.  Cardiovascular: Regular s1s2, no murmur, rub or gallop. No tachycardia.  GI: Soft, minimally distended, non tender, bowel tones active.  Skin/Integumen: No rash, no blister.  MSK: Lt BKA  : Bilateral percutaneous nephrostomy tubes in place.  Draining dark concentrated urine with old blood.  Neuro: Alert awake and oriented ×3 today, follows verbal commands appropriately, CN 2-12 intact.  Does have mild tremor.    Medications     sodium chloride 100 mL/hr at 05/23/18 0606       carvedilol  3.125 mg Oral BID w/meals     cefTRIAXone  1 g Intravenous Q24H     gabapentin  (NEURONTIN) tablet 600 mg  600 mg Oral BID     sodium chloride (PF)  3 mL Intracatheter Q8H     IV Fluid with vitamins   Intravenous Q24H       Data     Recent Labs  Lab 05/23/18  0630 05/22/18  1715 05/22/18  1455 05/22/18  0737 05/21/18  1719   WBC 6.7  --   --  10.6 7.0   HGB 9.5*  --   --  10.0* 11.0*   MCV 85  --   --  84 83   PLT 83*  --   --  80* 119*    141 141 139 138   POTASSIUM 4.0 4.2 4.1 3.9 4.0   CHLORIDE 109 111* 112* 109 104   CO2 23 24 23 22 25   BUN 31* 26 26 23 19   CR 1.75* 1.85* 1.82* 1.69* 1.22   ANIONGAP 7 6 6 8 9   BENNIE 7.6* 7.8* 7.7* 8.1* 9.0   GLC 99 127* 129* 106* 118*   ALBUMIN  --   --   --  2.8* 3.2*   PROTTOTAL  --   --   --  6.6* 7.5   BILITOTAL  --   --   --  1.5* 1.7*   ALKPHOS  --   --   --  89 105   ALT  --   --   --  16 12   AST  --   --   --  41 32   TROPI  --   --   --   --  0.027       Recent Results (from the past 24 hour(s))   CT Abdomen Pelvis w/o Contrast    Narrative    CT ABDOMEN AND PELVIS WITHOUT CONTRAST 5/22/2018 4:02 PM     HISTORY: UTI, sepsis. Worsened Creatinine.      COMPARISON: None.    TECHNIQUE: Axial CT images of the abdomen and pelvis from the  diaphragm to the symphysis pubis were acquired without IV contrast.  Radiation dose for this scan was reduced using automated exposure  control, adjustment of the mA and/or kV according to patient size, or  iterative reconstruction technique.    FINDINGS: There is a stone which measures 1.1 cm in maximal axial  dimension and 1.7 cm in length in the proximal left ureter. Just above  this is a 7 mm stone on the left. There is mild left hydronephrosis.  In the distal right ureter there are two stones adjacent to one  another measuring 8 mm in maximal dimension. There is mild-moderate  hydronephrosis proximal to this on the right. There are approximately  eight nonobstructing stones in the left kidney measuring up to 8 mm.  No intrarenal stones are seen on the right. There is mild bilateral  perinephric fat  stranding. Kidneys are normal in size and  configuration.  There is moderate diverticulosis without evidence for  diverticulitis. There are no dilated loops of small intestine or large  bowel to suggest ileus or obstruction. Minimal free fluid. No free  air. Somewhat lobular contour to the liver noted which is nonspecific.  Cholecystectomy changes. There are extensive atherosclerotic changes  of the visualized aorta and its branches. There is no evidence of  aortic aneurysm.  No splenomegaly.  No definite adrenal nodules.   Pancreas grossly unremarkable. The remainder of the visualized abdomen  is unremarkable on this noncontrast scan. Survey of the visualized  bony structures demonstrates no destructive bony lesions. Small right  pleural effusion with associated compressive atelectasis and/or  infiltrate. Minimal probable atelectasis associated with calcified  pleural plaque at the left base. There are extensive coronary vascular  calcifications and/or stents consistent with coronary artery disease.      Impression    IMPRESSION:   1. Stones measuring 1.1 and 0.7 cm in maximal axial dimension are seen  in the proximal left ureter. There is mild proximal hydronephrosis.  2. Two stones measuring 8 mm in maximal axial dimension are seen in  the distal right ureter with mild-moderate proximal hydronephrosis.

## 2018-05-23 NOTE — CONSULTS
[facility]    Urology Consultation     Date of Admission:  5/21/2018    Assessment & Plan   Antonio Ramirez is a 73 year old male who was admitted on 5/21/2018. I was asked to see the patient for bilateral obstructing ureteral stones with urosepsis, s/p bilateral PCN placements.    Plan: Continue PCNs for now, IR to evaluate patency given low output. Plan for possible antegrade stent placement tomorrow with IR and removal of PCNs.   -Continue abx, adjust with final cultures  -Follow up with urology in approx 2wks for definitive stone and ureteral stent management.     Kaylynn Caballero PA-C  [facility]  [address] [place] [id number]  [phone]  [email or web address]  Text Page (7am to 5pm)    Code Status    Full Code    Reason for Consult   Reason for consult: I was asked by Dr. Castillo to evaluate this patient for bilateral obstructing ureteral stones with urosepsis.    Primary Care Physician   Main Hardy    Chief Complaint   Fevers, weakness    History is obtained from the patient    History of Present Illness   Antonio Ramirez is a 73 year old male who was admitted on 5/21 with generalized weakness and fevers. UA was grossly positive for UTI on admission and he was initiated on abx and IV fluids. Kidney fxn was elevated at 1.22 on admission but continued to rise, up to 1.85 yesterday afternoon/evening along with persistent fevers. A CT scan was then ordered to evaluate for possible obstruction and he was found to have bilateral obstructing ureteral stones (1.1cm and 7mm left UPJ, two 8.3mm right mid-ureter) with mild-moderate bilateral hydronephrosis. Dr Hunter was called yesterday evening and recommended bilateral PCN placement with IR.     Patient now resting comfortably, bilateral PCNs draining bloody urine. Denies any flank pain or dysuria throughout his hospital stay. Upon further questioning, patient notes a known hx of bilateral  renal stones for which he has followed with Dr. Bello in the past although it is unclear how many years it has been since he was last evaluated in the urology office. He also notes a lithotripsy procedure in the 80's, prior UTIs and 1-2 episodes of gross hematuria within the past year for which he has attributed to his stone disease. Currently denies flank or suprapubic discomfort.     AVSS, tmax 102.7  Creat 1.85-->1.75  Hb 10.0  WBC 10.6  UCx >100K gram neg rods  BCx preliminarily positive for klebsiella pneumoniae     Past Medical History   I have reviewed this patient's medical history and updated it with pertinent information if needed.   Past Medical History:   Diagnosis Date     Alcoholism (H)      Amputated left leg (H)      Anemia      Anticardiolipin antibody syndrome (H)      Antiplatelet or antithrombotic long-term use      Aortic stenosis      Arthritis      Balance problem      Cardiomyopathy (H)      Coagulation disorder (H)     cardiolipinantibody syndrome     Coronary artery disease      Gastro-oesophageal reflux disease      Heart attack 2007     Hiatal hernia      Hypercholesterolemia      Hypertension      Left foot drop      Liver disease     alcoholic liver disease, alcoholic cirrohsosis, portal hypertension     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)     ?When diagnosed, patient not aware of this     Moderate mitral regurgitation      Monoclonal gammopathy      Neuropathy      Noninfectious ileitis     diverticulitis of colon     PE (pulmonary embolism) 2006     Prostate cancer (H) 2000    Treated with radiation (seed implants in 2000) -- no problems since     Renal disease     kidney stones     Syncope      Thoracic aortic aneurysm, without rupture      Thrombocytopenia (H)      Thrombosis of leg      Urethral stricture        Past Surgical History   I have reviewed this patient's surgical history and updated it with pertinent information if needed.  Past Surgical History:    Procedure Laterality Date     AMPUTATE LEG BELOW KNEE Left 04/2014    related to gangrene, started as foot ulcer related to neuropathy     AMPUTATE LEG BELOW KNEE Left 12/4/2017    Procedure: AMPUTATE LEG BELOW KNEE;  Exploration of Left Leg Below Knee Amputation Stump with Ligation of Bleeders.;  Surgeon: Leandro Arreola MD;  Location:  OR     APPENDECTOMY       APPLY WOUND VAC Left 12/6/2017    Procedure: APPLY WOUND VAC;;  Surgeon: Saima Howard MD;  Location:  [facility]     ARTHROPLASTY KNEE Right 9/19/2014    Procedure: ARTHROPLASTY KNEE;  Surgeon: Saima Howard MD;  Location:  OR     CARDIAC SURGERY      CABG     CHOLECYSTECTOMY       COLONOSCOPY       CRANIOTOMY Right 4/7/2018    Procedure: CRANIOTOMY;  Right Craniotomy For Subdural Hematoma;  Surgeon: Jono Issa MD;  Location:  OR     CYSTOSCOPY, DILATE URETHRA, COMBINED N/A 10/12/2016    Procedure: COMBINED CYSTOSCOPY, DILATE URETHRA;  Surgeon: Dimitry Bello MD;  Location:  OR     ENDOSCOPIC STRIPPING VEIN(S)       esophagogastroduodenoscopy       EYE SURGERY      cataracts     GENITOURINARY SURGERY      Prostate Seen Implants     HERNIA REPAIR      Umbilical     INCISION AND DRAINAGE LOWER EXTREMITY, COMBINED Left 12/1/2017    Procedure: COMBINED INCISION AND DRAINAGE LOWER EXTREMITY;  INCISION AND DRAINAGE OF LEFT BELOW KNEE AMPUTATION ABSCESS, WOUND VAC PLACEMENT;  Surgeon: Saima Howard MD;  Location:  OR     IR IVC FILTER PLACEMENT       IRRIGATION AND DEBRIDEMENT LOWER EXTREMITY, COMBINED Left 12/6/2017    Procedure: COMBINED IRRIGATION AND DEBRIDEMENT LOWER EXTREMITY;  IRRIGATION AND DEBRIDEMENT OF LEFT BELOW KNEE AMPUTATION SITE WITH WOUND VAC REPLACEMENT;  Surgeon: Saima Howard MD;  Location:  [facility]     IRRIGATION AND DEBRIDEMENT LOWER EXTREMITY, COMBINED Left 12/8/2017    Procedure: COMBINED IRRIGATION AND DEBRIDEMENT LOWER EXTREMITY;  IRRIGATION AND DEBRIDEMENT OF LEFT BELOW THE KNEE AMPUTATION AND  "SHORTENING OF THE TIBIA WITH WOUND CLOSURE;  Surgeon: Saima Howard MD;  Location:  OR     ORTHOPEDIC SURGERY      (R) knee scope     ORTHOPEDIC SURGERY      total hip      ORTHOPEDIC SURGERY      elbow surgery       Prior to Admission Medications   Prior to Admission Medications   Prescriptions Last Dose Informant Patient Reported? Taking?   ASPIRIN NOT PRESCRIBED (INTENTIONAL)   Yes No   Sig: Please choose reason not prescribed, below   GABAPENTIN PO 2018 at PM Self Yes Yes   Sig: Take 600 mg by mouth 2 times daily   HYDROMORPHONE HCL PO Past Week at Unknown time Self Yes Yes   Sig: Take 2-4 mg by mouth every 4 hours as needed for moderate to severe pain   carvedilol (COREG) 3.125 MG tablet 2018 at PM Self Yes Yes   Sig: Take 1 tablet (3.125 mg) by mouth 2 times daily (with meals)   fluticasone (FLONASE) 50 MCG/ACT spray 2018 at PM Self Yes Yes   Sig: Spray 2 sprays into both nostrils daily   torsemide (DEMADEX) 20 MG tablet 2018 at AM Self Yes Yes   Sig: Take 20 mg by mouth daily      Facility-Administered Medications: None     Allergies   Allergies   Allergen Reactions     Hmg-Coa-R Inhibitors Other (See Comments)     Rhabdomyolysis occurred within a couple days     Ciprofloxacin Itching     Severe itching \"like ants were crawling\"       Social History   I have reviewed this patient's social history and updated it with pertinent information if needed. Antonio Ramirez  reports that he has never smoked. He has never used smokeless tobacco. He reports that he drinks alcohol. He reports that he does not use illicit drugs.    Family History   I have reviewed this patient's family history and updated it with pertinent information if needed.   Family History   Problem Relation Age of Onset     Lung Cancer Mother      Heart Failure Father       age 87       Review of Systems   The 10 point Review of Systems is negative other than noted in the HPI or here.     Physical Exam   Temp: 98.3  F " (36.8  C) Temp src: Axillary BP: 92/65   Heart Rate: 91 Resp: 16 SpO2: 94 % O2 Device: None (Room air)    Vital Signs with Ranges  Temp:  [95.6  F (35.3  C)-98.3  F (36.8  C)] 98.3  F (36.8  C)  Heart Rate:  [55-93] 91  Resp:  [8-26] 16  BP: ()/(54-87) 92/65  SpO2:  [91 %-100 %] 94 %  218 lbs 6.4 oz    Constitutional: Sitting up in bed, NAD, wife at bedside   Eyes: no icterus  ENT: normocephalic, atraumatic   Respiratory: breathing unlabored   Cardiovascular: chest wall symmetric   GI: soft, NT, ND. No CVAT  Lymph/Hematologic: no pedal edema  Genitourinary: bilateral PCNs with bloody output  Skin: well perfused   Neurologic: no focal deficits  Neuropsychiatric: A&Ox3    Data   Results for orders placed or performed during the hospital encounter of 05/21/18 (from the past 24 hour(s))   Basic metabolic panel   Result Value Ref Range    Sodium 141 133 - 144 mmol/L    Potassium 4.1 3.4 - 5.3 mmol/L    Chloride 112 (H) 94 - 109 mmol/L    Carbon Dioxide 23 20 - 32 mmol/L    Anion Gap 6 3 - 14 mmol/L    Glucose 129 (H) 70 - 99 mg/dL    Urea Nitrogen 26 7 - 30 mg/dL    Creatinine 1.82 (H) 0.66 - 1.25 mg/dL    GFR Estimate 36 (L) >60 mL/min/1.7m2    GFR Estimate If Black 44 (L) >60 mL/min/1.7m2    Calcium 7.7 (L) 8.5 - 10.1 mg/dL   CT Abdomen Pelvis w/o Contrast    Narrative    CT ABDOMEN AND PELVIS WITHOUT CONTRAST 5/22/2018 4:02 PM     HISTORY: UTI, sepsis. Worsened Creatinine.      COMPARISON: None.    TECHNIQUE: Axial CT images of the abdomen and pelvis from the  diaphragm to the symphysis pubis were acquired without IV contrast.  Radiation dose for this scan was reduced using automated exposure  control, adjustment of the mA and/or kV according to patient size, or  iterative reconstruction technique.    FINDINGS: There is a stone which measures 1.1 cm in maximal axial  dimension and 1.7 cm in length in the proximal left ureter. Just above  this is a 7 mm stone on the left. There is mild left hydronephrosis.  In  the distal right ureter there are two stones adjacent to one  another measuring 8 mm in maximal dimension. There is mild-moderate  hydronephrosis proximal to this on the right. There are approximately  eight nonobstructing stones in the left kidney measuring up to 8 mm.  No intrarenal stones are seen on the right. There is mild bilateral  perinephric fat stranding. Kidneys are normal in size and  configuration.  There is moderate diverticulosis without evidence for  diverticulitis. There are no dilated loops of small intestine or large  bowel to suggest ileus or obstruction. Minimal free fluid. No free  air. Somewhat lobular contour to the liver noted which is nonspecific.  Cholecystectomy changes. There are extensive atherosclerotic changes  of the visualized aorta and its branches. There is no evidence of  aortic aneurysm.  No splenomegaly.  No definite adrenal nodules.   Pancreas grossly unremarkable. The remainder of the visualized abdomen  is unremarkable on this noncontrast scan. Survey of the visualized  bony structures demonstrates no destructive bony lesions. Small right  pleural effusion with associated compressive atelectasis and/or  infiltrate. Minimal probable atelectasis associated with calcified  pleural plaque at the left base. There are extensive coronary vascular  calcifications and/or stents consistent with coronary artery disease.      Impression    IMPRESSION:   1. Stones measuring 1.1 and 0.7 cm in maximal axial dimension are seen  in the proximal left ureter. There is mild proximal hydronephrosis.  2. Two stones measuring 8 mm in maximal axial dimension are seen in  the distal right ureter with mild-moderate proximal hydronephrosis.   Basic metabolic panel   Result Value Ref Range    Sodium 141 133 - 144 mmol/L    Potassium 4.2 3.4 - 5.3 mmol/L    Chloride 111 (H) 94 - 109 mmol/L    Carbon Dioxide 24 20 - 32 mmol/L    Anion Gap 6 3 - 14 mmol/L    Glucose 127 (H) 70 - 99 mg/dL    Urea Nitrogen  26 7 - 30 mg/dL    Creatinine 1.85 (H) 0.66 - 1.25 mg/dL    GFR Estimate 36 (L) >60 mL/min/1.7m2    GFR Estimate If Black 43 (L) >60 mL/min/1.7m2    Calcium 7.8 (L) 8.5 - 10.1 mg/dL   Fractional excretion of sodium   Result Value Ref Range    Creatinine Urine 234 mg/dL    Sodium Urine mmol/L 12 mmol/L    %FENA <0.1 %   IR Nephrostomy Tube Placement Bilateral    Narrative    PROCEDURE(S):  1. Left antegrade nephrostogram  2. Placement of 8French left percutaneous nephrostomy tube   3. Right antegrade nephrostogram  4. Placement of 8French right percutaneous nephrostomy tube    DATE OF PROCEDURE: 5/22/2018    MEDICATIONS: 15 mL 1% lidocaine SQ, Versed 3 mg IV, Fentanyl 150 mcg  IV, patient was receiving antibiotics on the floor.    CONTRAST: 30 mL Isovue 300 into the renal collecting system.    FLUOROSCOPY TIME: 5.9 minutes, (19 mGy).    COMPLICATIONS: None.    CLINICAL HISTORY/INDICATION: 73-year-old male with urosepsis and  obstructing ureteral stones.    PROCEDURES AND FINDINGS:  Following a discussion of the risks,  benefits, indications and alternatives to treatment, appropriate  informed consent was obtained.  The patient was brought to the  interventional radiology suite and placed on the table. The bilateral  flank were prepped and draped in usual sterile fashion. A time out was  performed per universal protocol policy to ensure correct patient,  site and procedure to be performed.     A preliminary ultrasound of the left flank was performed which  demonstrates no hydronephrosis. Echogenic shadowing stones are noted.   Ultrasound images were archived.  Then, under ultrasound guidance an  appropriate site was marked in the left lateral lower back region for  needle placement and this region was subsequently infiltrated with 1%  lidocaine for local anesthesia. Under direct ultrasound guidance, a  21-gauge needle was inserted into the upper pole of the calyx until  urine return was noted. A small amount of  "contrast was then injected,  which demonstrates no hydronephrosis or hydroureter. There is a  filling defect in the proximal ureter consistent with known stone.  Contrast flows easily down the ureter into the bladder.  A 0.018\" wire  was then advanced through the needle and coiled within the renal  pelvis. The needle was then exchanged for a coaxial Guillermo set, which  was advanced over the wire through the calyx into the renal pelvis.  The 0.018\" wire was placed to the side as a safety wire and a 0.035\"  guidewire was advanced into the renal pelvis. The tract was serially  dilated with final placement of a 8 Tajik nephrostomy tube with the  tip coiled in the renal pelvis. Injection of contrast demonstrates  pigtail is centered within the renal pelvis.  The contrast was  aspirated and the tube was placed to gravity bag drainage. The  catheter was secured to the skin using a 2-0 Ethilon and a sterile  dressing was applied.      Attention was then turned to the right kidney. Preliminary ultrasound  of the right flank demonstrates mild hydronephrosis. An appropriate  site was marked in the right lower back region for needle placement  and this region was then infiltrated with 1% lidocaine. Under direct  ultrasound guidance, a 21-gauge needle was inserted into the lower  pole of the dilated calyx until urine return was noted.  A small  amount of contrast was then injected which demonstrates mild  hydronephrosis and hydroureter. Filling defect is seen in the distal  ureter consistent with known stone. Contrast flows easily down the  ureter into the bladder.  A 0.018\" wire was then advanced through the  needle and coiled within the renal pelvis. The needle was then  exchanged for a coaxial Guillermo set, which was advanced over the wire  through the calyx into the renal pelvis. The 0.018 wire was placed to  the side as a safety wire and a 0.035\" guidewire was advanced into the  renal pelvis. The tract was serially dilated with " final placement of  an 8 Tongan nephrostomy tube with the tip coiled in the renal pelvis.  Injection of contrast demonstrates the pigtail centered within the  renal pelvis.  The contrast was aspirated and the tube was placed to  gravity bag drainage. The catheter was secured to the skin using a 2-0  Ethilon and a sterile dressing was applied.      Throughout the procedure, the patient was monitored by a radiology  nurse for cardiac rhythm and oxygen saturation which remained stable.  Total sedation time was 52 minutes The patient tolerated the procedure  well and left interventional radiology in stable condition.      Impression    IMPRESSION:  1. Successful placement of a 8 Tongan left percutaneous nephrostomy  tube. Left antegrade nephrostogram demonstrates no hydronephrosis or  hydroureter. The left collecting system is patent. Contrast flows  easily down the ureter into the bladder without evidence of  obstruction. Filling defect is seen in the proximal left ureter,  consistent with known stone.  2. Successful placement of a right Tongan right percutaneous  nephrostomy tube. Right antegrade nephrostogram demonstrates mild to  moderate hydronephrosis and hydroureter. The right collecting system  is patent. Contrast flows easily down the ureter into the bladder  without evidence of obstruction. Filling defect is seen in the distal  right ureter, consistent with known stone.    PRETTY JO DO   CBC with platelets differential   Result Value Ref Range    WBC 6.7 4.0 - 11.0 10e9/L    RBC Count 3.58 (L) 4.4 - 5.9 10e12/L    Hemoglobin 9.5 (L) 13.3 - 17.7 g/dL    Hematocrit 30.5 (L) 40.0 - 53.0 %    MCV 85 78 - 100 fl    MCH 26.5 26.5 - 33.0 pg    MCHC 31.1 (L) 31.5 - 36.5 g/dL    RDW 19.2 (H) 10.0 - 15.0 %    Platelet Count 83 (L) 150 - 450 10e9/L    Diff Method Automated Method     % Neutrophils 82.1 %    % Lymphocytes 7.5 %    % Monocytes 9.1 %    % Eosinophils 0.4 %    % Basophils 0.6 %    % Immature  Granulocytes 0.3 %    Nucleated RBCs 0 0 /100    Absolute Neutrophil 5.5 1.6 - 8.3 10e9/L    Absolute Lymphocytes 0.5 (L) 0.8 - 5.3 10e9/L    Absolute Monocytes 0.6 0.0 - 1.3 10e9/L    Absolute Eosinophils 0.0 0.0 - 0.7 10e9/L    Absolute Basophils 0.0 0.0 - 0.2 10e9/L    Abs Immature Granulocytes 0.0 0 - 0.4 10e9/L    Absolute Nucleated RBC 0.0    Basic metabolic panel   Result Value Ref Range    Sodium 139 133 - 144 mmol/L    Potassium 4.0 3.4 - 5.3 mmol/L    Chloride 109 94 - 109 mmol/L    Carbon Dioxide 23 20 - 32 mmol/L    Anion Gap 7 3 - 14 mmol/L    Glucose 99 70 - 99 mg/dL    Urea Nitrogen 31 (H) 7 - 30 mg/dL    Creatinine 1.75 (H) 0.66 - 1.25 mg/dL    GFR Estimate 38 (L) >60 mL/min/1.7m2    GFR Estimate If Black 46 (L) >60 mL/min/1.7m2    Calcium 7.6 (L) 8.5 - 10.1 mg/dL

## 2018-05-23 NOTE — PROGRESS NOTES
Received call from RN regarding bilateral nephrostomy tubes outputs are low.  Assessed tubes, not draining.  Flushed right nephrostomy tube with 10cc sterile normal saline, immediate return with clot noted in the tube, drainage is maroon in color.  Flushed left nephrostomy tube with 10 cc normal saline, slow return but is draining, drainage is pink tinged in color.  Set up tube with 3 way stop cocks to flush every 8 hours.  Placed order for tube flush. Changed left site dressing.  Right CDI  Consulted with Dr. Pittman, agreed to plan.  Communicated with Summer Burnham RN  Interventional Radiology  [phone]  Pager: [phone]

## 2018-05-23 NOTE — PROGRESS NOTES
Interventional Radiology Intra-procedural Nursing Note    Patient Name: Antonio Ramirez  Medical Record Number: [id number]  Today's Date: May 22, 2018    Start Time: 1947  End of procedure time: 2030  Procedure: Bilateral Nephrostomy Tube Placement  Report given to: Светлана ROBISN, station 88  Time pt departs:  2040    Other Notes: Patient into IR suite 2 via cart with IVF and banana bag infusing to left PIV. Awake and alert. Consent signed with patient and Dr. Pittman. 1g rocephin given. To table in prone position. Prepped and draped with 2% chlorhexidine to lower posterior back. VSS, normal sinus rhythm. Dr. Pittman in room, timeout and procedure started. 8F nephrostomy tube to left kidney with urine return. 8F nephrostomy tube to right kidney with urine return. Patient tolerated procedure well. Versed 3 mg and fentanyl 150 mcg given. Debrief with Dr. Pittman, no complications. Report called to station 88. Tubes connected to leg bags. Dressings clean, dry and intact.     Lashell Flower RN

## 2018-05-23 NOTE — PROCEDURES
RADIOLOGY POST PROCEDURE NOTE w/ SEDATION  Patient name: Antonio Ramirez  MRN: [id number]  : 1945    Pre-procedure diagnosis: bilateral hydronephrosis and ureteral stones.   Post-procedure diagnosis: Same    Procedure Date/Time: May 22, 2018  8:34 PM  Procedure: bilateral nephrostomy tube placement.   Estimated blood loss: None  Specimen(s) collected with description: none    I determined this patient to be an appropriate candidate for the planned sedation and procedure and reassessed the patient IMMEDIATELY PRIOR to sedation and procedure.     The patient tolerated the procedure well with no immediate complications.  Significant findings:  1. No left hydronephrosis. Stone noted in the proximal left ureter. Contrast flows easily thorough the ureter into the bladder, without evidence of obstruction.   2. Mild right hydronephrosis and hydroureter. Stone noted in the distal ureter. Contrast flows easily through the ureter into the bladder, without evidence of obstruction.     See imaging dictation for procedural details.    Provider name: Mary Pittman  Assistant(s):None

## 2018-05-24 ENCOUNTER — APPOINTMENT (OUTPATIENT)
Dept: INTERVENTIONAL RADIOLOGY/VASCULAR | Facility: CLINIC | Age: 73
DRG: 872 | End: 2018-05-24
Attending: PHYSICIAN ASSISTANT
Payer: MEDICARE

## 2018-05-24 ENCOUNTER — APPOINTMENT (OUTPATIENT)
Dept: PHYSICAL THERAPY | Facility: CLINIC | Age: 73
DRG: 872 | End: 2018-05-24
Attending: HOSPITALIST
Payer: MEDICARE

## 2018-05-24 LAB
ACANTHOCYTES BLD QL SMEAR: SLIGHT
ANION GAP SERPL CALCULATED.3IONS-SCNC: 10 MMOL/L (ref 3–14)
APTT PPP: 37 SEC (ref 22–37)
BACTERIA SPEC CULT: ABNORMAL
BASOPHILS # BLD AUTO: 0 10E9/L (ref 0–0.2)
BASOPHILS NFR BLD AUTO: 0 %
BUN SERPL-MCNC: 32 MG/DL (ref 7–30)
BURR CELLS BLD QL SMEAR: SLIGHT
CALCIUM SERPL-MCNC: 7.4 MG/DL (ref 8.5–10.1)
CHLORIDE SERPL-SCNC: 110 MMOL/L (ref 94–109)
CLOSURE TME COLL+EPINEP BLD: 99 SEC
CO2 SERPL-SCNC: 18 MMOL/L (ref 20–32)
CREAT SERPL-MCNC: 1.57 MG/DL (ref 0.66–1.25)
DACRYOCYTES BLD QL SMEAR: SLIGHT
DIFFERENTIAL METHOD BLD: ABNORMAL
EOSINOPHIL # BLD AUTO: 0.1 10E9/L (ref 0–0.7)
EOSINOPHIL NFR BLD AUTO: 1 %
ERYTHROCYTE [DISTWIDTH] IN BLOOD BY AUTOMATED COUNT: 19.2 % (ref 10–15)
GFR SERPL CREATININE-BSD FRML MDRD: 43 ML/MIN/1.7M2
GLUCOSE SERPL-MCNC: 81 MG/DL (ref 70–99)
HCT VFR BLD AUTO: 28.2 % (ref 40–53)
HGB BLD-MCNC: 9 G/DL (ref 13.3–17.7)
INR PPP: 1.68 (ref 0.86–1.14)
LYMPHOCYTES # BLD AUTO: 0.6 10E9/L (ref 0.8–5.3)
LYMPHOCYTES NFR BLD AUTO: 11 %
Lab: ABNORMAL
Lab: ABNORMAL
MCH RBC QN AUTO: 26.7 PG (ref 26.5–33)
MCHC RBC AUTO-ENTMCNC: 31.9 G/DL (ref 31.5–36.5)
MCV RBC AUTO: 84 FL (ref 78–100)
MONOCYTES # BLD AUTO: 0.4 10E9/L (ref 0–1.3)
MONOCYTES NFR BLD AUTO: 8 %
NEUTROPHILS # BLD AUTO: 4.3 10E9/L (ref 1.6–8.3)
NEUTROPHILS NFR BLD AUTO: 80 %
OVALOCYTES BLD QL SMEAR: SLIGHT
PLATELET # BLD AUTO: 68 10E9/L (ref 150–450)
PLATELET # BLD EST: ABNORMAL 10*3/UL
POTASSIUM SERPL-SCNC: 4 MMOL/L (ref 3.4–5.3)
RBC # BLD AUTO: 3.37 10E12/L (ref 4.4–5.9)
SODIUM SERPL-SCNC: 138 MMOL/L (ref 133–144)
SPECIMEN SOURCE: ABNORMAL
SPECIMEN SOURCE: ABNORMAL
WBC # BLD AUTO: 5.4 10E9/L (ref 4–11)

## 2018-05-24 PROCEDURE — C1769 GUIDE WIRE: HCPCS

## 2018-05-24 PROCEDURE — 85025 COMPLETE CBC W/AUTO DIFF WBC: CPT | Performed by: RADIOLOGY

## 2018-05-24 PROCEDURE — 85730 THROMBOPLASTIN TIME PARTIAL: CPT | Performed by: RADIOLOGY

## 2018-05-24 PROCEDURE — 85610 PROTHROMBIN TIME: CPT | Performed by: RADIOLOGY

## 2018-05-24 PROCEDURE — 85025 COMPLETE CBC W/AUTO DIFF WBC: CPT | Performed by: HOSPITALIST

## 2018-05-24 PROCEDURE — 82565 ASSAY OF CREATININE: CPT | Performed by: RADIOLOGY

## 2018-05-24 PROCEDURE — 25000125 ZZHC RX 250: Performed by: RADIOLOGY

## 2018-05-24 PROCEDURE — 27210905 ZZH KIT CR7

## 2018-05-24 PROCEDURE — 25000132 ZZH RX MED GY IP 250 OP 250 PS 637: Mod: GY | Performed by: HOSPITALIST

## 2018-05-24 PROCEDURE — 99153 MOD SED SAME PHYS/QHP EA: CPT

## 2018-05-24 PROCEDURE — 97162 PT EVAL MOD COMPLEX 30 MIN: CPT | Mod: GP | Performed by: PHYSICAL THERAPIST

## 2018-05-24 PROCEDURE — A9270 NON-COVERED ITEM OR SERVICE: HCPCS | Mod: GY | Performed by: HOSPITALIST

## 2018-05-24 PROCEDURE — 25000128 H RX IP 250 OP 636: Performed by: HOSPITALIST

## 2018-05-24 PROCEDURE — 99233 SBSQ HOSP IP/OBS HIGH 50: CPT | Performed by: HOSPITALIST

## 2018-05-24 PROCEDURE — 80048 BASIC METABOLIC PNL TOTAL CA: CPT | Performed by: HOSPITALIST

## 2018-05-24 PROCEDURE — 27210742 ZZH CATH CR1

## 2018-05-24 PROCEDURE — 97530 THERAPEUTIC ACTIVITIES: CPT | Mod: GP | Performed by: PHYSICAL THERAPIST

## 2018-05-24 PROCEDURE — 40000193 ZZH STATISTIC PT WARD VISIT: Performed by: PHYSICAL THERAPIST

## 2018-05-24 PROCEDURE — C1758 CATHETER, URETERAL: HCPCS

## 2018-05-24 PROCEDURE — C1729 CATH, DRAINAGE: HCPCS

## 2018-05-24 PROCEDURE — 25000128 H RX IP 250 OP 636: Performed by: RADIOLOGY

## 2018-05-24 PROCEDURE — 12000000 ZZH R&B MED SURG/OB

## 2018-05-24 PROCEDURE — 36415 COLL VENOUS BLD VENIPUNCTURE: CPT | Performed by: RADIOLOGY

## 2018-05-24 PROCEDURE — 85576 BLOOD PLATELET AGGREGATION: CPT | Performed by: RADIOLOGY

## 2018-05-24 RX ORDER — FLUMAZENIL 0.1 MG/ML
0.2 INJECTION, SOLUTION INTRAVENOUS
Status: DISCONTINUED | OUTPATIENT
Start: 2018-05-24 | End: 2018-05-24 | Stop reason: HOSPADM

## 2018-05-24 RX ORDER — LIDOCAINE HYDROCHLORIDE 10 MG/ML
INJECTION, SOLUTION INFILTRATION; PERINEURAL
Status: DISCONTINUED
Start: 2018-05-24 | End: 2018-05-24 | Stop reason: HOSPADM

## 2018-05-24 RX ORDER — LIDOCAINE 40 MG/G
CREAM TOPICAL
Status: DISCONTINUED | OUTPATIENT
Start: 2018-05-24 | End: 2018-05-24

## 2018-05-24 RX ORDER — FENTANYL CITRATE 50 UG/ML
INJECTION, SOLUTION INTRAMUSCULAR; INTRAVENOUS
Status: DISCONTINUED
Start: 2018-05-24 | End: 2018-05-24 | Stop reason: HOSPADM

## 2018-05-24 RX ORDER — NALOXONE HYDROCHLORIDE 0.4 MG/ML
.1-.4 INJECTION, SOLUTION INTRAMUSCULAR; INTRAVENOUS; SUBCUTANEOUS
Status: DISCONTINUED | OUTPATIENT
Start: 2018-05-24 | End: 2018-05-24 | Stop reason: HOSPADM

## 2018-05-24 RX ORDER — LIDOCAINE HYDROCHLORIDE 10 MG/ML
1-30 INJECTION, SOLUTION EPIDURAL; INFILTRATION; INTRACAUDAL; PERINEURAL
Status: COMPLETED | OUTPATIENT
Start: 2018-05-24 | End: 2018-05-24

## 2018-05-24 RX ORDER — FENTANYL CITRATE 50 UG/ML
25-50 INJECTION, SOLUTION INTRAMUSCULAR; INTRAVENOUS EVERY 5 MIN PRN
Status: DISCONTINUED | OUTPATIENT
Start: 2018-05-24 | End: 2018-05-24 | Stop reason: HOSPADM

## 2018-05-24 RX ADMIN — FENTANYL CITRATE 25 MCG: 50 INJECTION, SOLUTION INTRAMUSCULAR; INTRAVENOUS at 09:01

## 2018-05-24 RX ADMIN — FOLIC ACID 1 MG: 1 TABLET ORAL at 10:44

## 2018-05-24 RX ADMIN — CEFTAZIDIME 1 G: 1 INJECTION, SOLUTION INTRAVENOUS at 08:29

## 2018-05-24 RX ADMIN — SODIUM CHLORIDE: 9 INJECTION, SOLUTION INTRAVENOUS at 11:20

## 2018-05-24 RX ADMIN — GABAPENTIN 600 MG: 600 TABLET, FILM COATED ORAL at 22:20

## 2018-05-24 RX ADMIN — GABAPENTIN 600 MG: 600 TABLET, FILM COATED ORAL at 10:45

## 2018-05-24 RX ADMIN — CEFTRIAXONE 1 G: 1 INJECTION, POWDER, FOR SOLUTION INTRAMUSCULAR; INTRAVENOUS at 22:22

## 2018-05-24 RX ADMIN — Medication 100 MG: at 10:45

## 2018-05-24 RX ADMIN — MIDAZOLAM HYDROCHLORIDE 1 MG: 1 INJECTION, SOLUTION INTRAMUSCULAR; INTRAVENOUS at 08:43

## 2018-05-24 RX ADMIN — MIDAZOLAM HYDROCHLORIDE 0.5 MG: 1 INJECTION, SOLUTION INTRAMUSCULAR; INTRAVENOUS at 09:00

## 2018-05-24 RX ADMIN — FENTANYL CITRATE 25 MCG: 50 INJECTION, SOLUTION INTRAMUSCULAR; INTRAVENOUS at 08:43

## 2018-05-24 RX ADMIN — CARVEDILOL 3.12 MG: 3.12 TABLET, FILM COATED ORAL at 10:44

## 2018-05-24 RX ADMIN — SODIUM CHLORIDE: 9 INJECTION, SOLUTION INTRAVENOUS at 04:21

## 2018-05-24 RX ADMIN — OXYCODONE HYDROCHLORIDE 5 MG: 5 TABLET ORAL at 20:33

## 2018-05-24 RX ADMIN — LIDOCAINE HYDROCHLORIDE 20 ML: 10 INJECTION, SOLUTION INFILTRATION; PERINEURAL at 09:24

## 2018-05-24 NOTE — PROGRESS NOTES
[facility]    Hospitalist Progress Note  Date of Service (when I saw the patient): 05/24/2018    Assessment & Plan   Antonio Ramirez is a 73 year old male with complex past history recent traumatic subdural hematoma (4/7/18), alcoholic cirrhosis, chronic kidney disease, hypertension, CAD, atrial fibrillation, CHF among other chronic medical issues who presented with generalized weakness and found to have UTI.     Klebsiella bacteremia/sepsis due to UTI  Presented with fever and a lactate of 2.2 but no other SIRS criteria.  Abnormal UA with pyuria and leukocyte esterase.  Received zosyn empirically in ED due to sepsis.  - switched to ceftriaxone on admission, and continued.  -Patient was hypotensive despite fluid challenge, CT abdomen pelvis was done which revealed bilateral ureteric stones with hydronephrosis, discussed with urology and IR, status post percutaneous nephrostomy tubes placement 5/22/18.  - blood culture as well as urine culture klebsiella pneumoniae, sensitivity noted. Rocephin while in hospital, will change to ceftin or augmentin at discharge (curbsided with ID), 2 weeks course   - s/p retrograde ureteric stents placement 5/24 am. Plan is to perform nephrostogram next week and pull the tube.   - Urology following.     Acute kidney injury on CKD stage II due to obstruction  Bilateral ureteric stones with hydro-nephrosis:  Baseline creatinine 0.9-1.0.  Admission Cr 1.22.  - Cr worsened peaked at 1.85, received multiple IV fluid boluses, creatinine has slightly improved to 1.5 this a.m., expect it will continue to improve with the decompression   - suspect component of sepsis and prerenal as well given FENa less than 0.1  - Continue to monitor intake and output strictly.    - BP stable currently, urine output not as expected for post obs diuresis/ or despite IVF. Tubes are patent. Decrease IVF and dc later today.    -Pain management with Tylenol and as needed oxycodone.  Remains  narcotic as able.    Alcoholic cirrhosis  Alcohol abuse with withdrawal: Continues to drink daily.  Bilirubin mildly elevated LFTs otherwise within normal limits.  - low salt diet  - CIWA protocol initiated, scoring low now, has not needed Valium >24 hours  - possibly have small ascites on clinical exam, non tender, no concern for SBP at this time.  - Changed banana back to p.o. multivitamins thiamine and folic acid.     CAD status post CABG ×3 in 2007  Atrial fibrillation  Hypertension,  with hypotensive episodes.  TTE in January 2018 shows ejection fraction 40-45%, regional wall motion abnormalities, moderately dilated RV with decreased RV systolic function, moderate MR, mild AS and pulmonary hypertension.  - Continue carvedilol with hold parameters, hold diuretic in favor of IV fluids until BP stable, and Cr improves.  - Continue to hold clonidine       Acute on Chronic anemia and thrombocytopenia: Suspect due to cirrhosis.  Baseline hemoglobin approximately 10-11 g/dL, baseline plt 130-150.  - hgb and platelets have worsened due to sepsis and also due to blood loss from procedure.  - also blood tinged urine in the bags.  - He does have blood in his urine post procedure/tube placement, no active bleeding. Monitor     Traumatic subdural hematoma:  Healing well, he has been cleared to restart anticoagulation it is awaiting clearance from his outpatient oncologist.     History of DVT PE status post IVC filter, anticardiolipin antibody:  Anticoagulation as noted above.    # Pain Assessment:  Current Pain Score 5/24/2018   Patient currently in pain? denies   Pain score (0-10) -   Pain location -   Pain descriptors -   CPOT pain score -   - Antonio is experiencing pain due to ureteric stones and nephrostomy tubes. Pain management was discussed and the plan was created in a collaborative fashion.  Antonio's response to the current recommendations: mixed response  - Please see the plan for pain management as documented  above     DVT Prophylaxis: Pneumatic Compression Devices    Code Status: Full Code    Disposition: Expected discharge 1- 2 days, if ok with urology, remains afebrile, and Cr continues to improve. PT eval for dc planning, updated Helena and updated her over the phone this afternoon.    Alejandra Castillo MD  Hospitalist    Interval History    Patient was seen and examined, had bilateral ureteral stents placed this am, he was sleepy, woke up and upset about having to keep the tubes/bag for longer.   - did not voice any other concern.   - Afebrile.  - BP stable    -Data reviewed today: I reviewed all new labs and imaging results over the last 24 hours, retrograde ureteric stents placed.    Physical Exam   Temp: 99  F (37.2  C) Temp src: Oral BP: 131/79   Heart Rate: 87 Resp: 16 SpO2: 99 % O2 Device: Nasal cannula Oxygen Delivery: 2 LPM  Vitals:    05/21/18 2100 05/23/18 0620 05/24/18 0500   Weight: 96.2 kg (212 lb) 99.1 kg (218 lb 6.4 oz) 104.5 kg (230 lb 4.8 oz)     Vital Signs with Ranges  Temp:  [99  F (37.2  C)-100.2  F (37.9  C)] 99  F (37.2  C)  Heart Rate:  [75-99] 87  Resp:  [16-18] 16  BP: (101-135)/(62-86) 131/79  SpO2:  [93 %-99 %] 99 %  I/O last 3 completed shifts:  In: 5205 [P.O.:660; I.V.:4545]  Out: 790 [Urine:790]    Constitutional: Sleepy but woke up appropriately, oriented ×3, does not appear to be in distress.  HEENT: PERRLA EOMI, healed scar on the Rt parietal scalp.  Mucosa appears dry.  Respiratory: Bilateral equal air entry, clear to auscultation. No respiratory distress.  Cardiovascular: Regular s1s2, no murmur, rub or gallop. No tachycardia.  GI: Soft, minimally distended, non tender, bowel tones active.  Skin/Integumen: No rash, no blister.  MSK: Lt BKA  : Bilateral percutaneous nephrostomy tubes in place with bags. Draining blood tinged urine, no clots.  Neuro:  oriented ×3 today, follows verbal commands appropriately, CN 2-12 intact.       Medications     sodium chloride 50 mL/hr at 05/24/18  1236       carvedilol  3.125 mg Oral BID w/meals     cefTRIAXone  1 g Intravenous Q24H     folic acid  1 mg Oral Daily     gabapentin (NEURONTIN) tablet 600 mg  600 mg Oral BID     sodium chloride (PF)  10 mL Irrigation Q8H     sodium chloride (PF)  10 mL Irrigation Q8H     sodium chloride (PF)  10 mL Irrigation Q8H     sodium chloride (PF)  3 mL Intracatheter Q8H     vitamin  B-1  100 mg Oral Daily       Data     Recent Labs  Lab 05/24/18  0700 05/23/18  1600 05/23/18  0630  05/22/18  0737 05/21/18  1719   WBC 5.4  --  6.7  --  10.6 7.0   HGB 9.0*  --  9.5*  --  10.0* 11.0*   MCV 84  --  85  --  84 83   PLT 68*  --  83*  --  80* 119*   INR 1.68*  --   --   --   --   --     136 139  < > 139 138   POTASSIUM 4.0 4.2 4.0  < > 3.9 4.0   CHLORIDE 110* 106 109  < > 109 104   CO2 18* 22 23  < > 22 25   BUN 32* 31* 31*  < > 23 19   CR 1.57* 1.70* 1.75*  < > 1.69* 1.22   ANIONGAP 10 8 7  < > 8 9   BENNIE 7.4* 7.4* 7.6*  < > 8.1* 9.0   GLC 81 99 99  < > 106* 118*   ALBUMIN  --   --   --   --  2.8* 3.2*   PROTTOTAL  --   --   --   --  6.6* 7.5   BILITOTAL  --   --   --   --  1.5* 1.7*   ALKPHOS  --   --   --   --  89 105   ALT  --   --   --   --  16 12   AST  --   --   --   --  41 32   TROPI  --   --   --   --   --  0.027   < > = values in this interval not displayed.    No results found for this or any previous visit (from the past 24 hour(s)).

## 2018-05-24 NOTE — PROGRESS NOTES
05/24/18 1533   Quick Adds   Type of Visit Initial PT Evaluation   Living Environment   Lives With spouse   Living Arrangements apartment   Home Accessibility bed and bath on same level;grab bars present (bathtub)   Number of Stairs to Enter Home (Has elevator. )   Living Environment Comment Pateints spouse is retired and reports she can assist with ADL's but unable to provide much physical assist.    Self-Care   Dominant Hand ambidextrous   Usual Activity Tolerance good   Current Activity Tolerance poor   Activity/Exercise/Self-Care Comment Patient recently hospitalized due to a head bleed and transferred to ARU. Has been home for about 4 weeks.    Functional Level Prior   Ambulation 1-->assistive equipment   Transferring 1-->assistive equipment   Toileting 1-->assistive equipment   Bathing 1-->assistive equipment   Dressing 0-->independent   Eating 0-->independent   Communication 0-->understands/communicates without difficulty   Swallowing 0-->swallows foods/liquids without difficulty   Cognition 0 - no cognition issues reported   Prior Functional Level Comment Patient wasn't very receptive to question so unsure question if above is completely accurate.    General Information   Onset of Illness/Injury or Date of Surgery - Date 05/21/18   Referring Physician Alejandra Castillo   Patient/Family Goals Statement To go home.    Pertinent History of Current Problem (include personal factors and/or comorbidities that impact the POC) Antonio Ramirez is a 73 year old male with complex past history recent traumatic subdural hematoma (4/7/18), alcoholic cirrhosis, chronic kidney disease, hypertension, CAD, atrial fibrillation, CHF among other chronic medical issues who presented with generalized weakness and found to have UTI.   Precautions/Limitations fall precautions   Weight-Bearing Status - LUE full weight-bearing   Weight-Bearing Status - RUE full weight-bearing   Weight-Bearing Status - LLE (Left BKA)   Weight-Bearing  Status - RLE full weight-bearing   General Observations Patient not very receptive to any questions or any activity. Takes a lot of gentle encouragement.    Cognitive Status Examination   Orientation orientation to person, place and time   Level of Consciousness alert   Follows Commands and Answers Questions 100% of the time   Personal Safety and Judgment impulsive   Cognitive Comment Unable to completely assess as patient not very receptive to questions.    Pain Assessment   Patient Currently in Pain Yes, see Vital Sign flowsheet  (Rated 11 but no grimacing. Didn't appear to be in pain. )   Integumentary/Edema   Integumentary/Edema Comments Mild swelling left lower leg. No pitting but enough to keep patient from being able to don prosthesis.    Range of Motion (ROM)   ROM Comment Did note any specific limitation but again patient not very receptive to complete assessment.    Strength   Strength Comments Significant generalized weakness. Patient not receptive to MMT. Trunk weakness obvious with difficulty getting to edge of bed and with some instability with dynamic sitting.    Bed Mobility   Bed Mobility Comments Sup to/from sit without assist but took a lot of extra time especially with sup to sit and needed to use the rail.    Transfer Skills   Transfer Comments Unable to transfer due to inability to don prosthesis.    Balance   Balance Comments Able to maintain static sitting blalance however patient very unsteady and needed close CGA with dynamic challenges like donning prosthesis.    Sensory Examination   Sensory Perception Comments Reports bilateral hand and right lower leg numbness due to peripheral neuropathy.    Coordination   Coordination Comments Patient had a lot of diffuclty donning  and prosthesis due to bilateral hand incoordination/nubness.    General Therapy Interventions   Planned Therapy Interventions balance training;gait training;prosthetic fitting/training;strengthening;transfer  "training   Clinical Impression   Criteria for Skilled Therapeutic Intervention yes, treatment indicated   PT Diagnosis Impaired functional independence   Influenced by the following impairments Significant generalized weakness, numbness bilateral hands and right lower leg, swelling left residual limb   Functional limitations due to impairments Needs assist with bed mobilty, dynamic sitting balance, unable to amb, unable to don prosthesis.    Clinical Presentation Evolving/Changing   Clinical Presentation Rationale 3+comorbidities and limitations affecting evaluation   Clinical Decision Making (Complexity) Moderate complexity   Therapy Frequency` daily   Predicted Duration of Therapy Intervention (days/wks) 5 days   Anticipated Equipment Needs at Discharge (To be determined by rehab. )   Anticipated Discharge Disposition Transitional Care Facility   Risk & Benefits of therapy have been explained Yes   Patient, Family & other staff in agreement with plan of care Yes   [facility] [employer] TM \"6 Clicks\"   2016, Trustees of [facility], under license to [employer].  All rights reserved.   6 Clicks Short Forms Basic Mobility Inpatient Short Form   [facility] AM-PAC  \"6 Clicks\" V.2 Basic Mobility Inpatient Short Form   1. Turning from your back to your side while in a flat bed without using bedrails? 3 - A Little   2. Moving from lying on your back to sitting on the side of a flat bed without using bedrails? 3 - A Little   3. Moving to and from a bed to a chair (including a wheelchair)? 1 - Total   4. Standing up from a chair using your arms (e.g., wheelchair, or bedside chair)? 1 - Total   5. To walk in hospital room? 1 - Total   6. Climbing 3-5 steps with a railing? 1 - Total   Basic Mobility Raw Score (Score out of 24.Lower scores equate to lower levels of function) 10   Total Evaluation Time   Total Evaluation Time (Minutes) 10     "

## 2018-05-24 NOTE — PROGRESS NOTES
Interventional Radiology Pre-Procedure Sedation Assessment   Time of Assessment: 8:33 AM    Expected Level: Moderate Sedation    Indication: Sedation is required for the following type of Procedure: Bilateral ureteral stent placement    Sedation and procedural consent: Risks, benefits and alternatives were discussed with Patient, Dr Hardik GRACE Intake: Appropriately NPO for procedure    ASA Class: Class 2 - MILD SYSTEMIC DISEASE, NO ACUTE PROBLEMS, NO FUNCTIONAL LIMITATIONS.    Mallampati: Grade 2:  Soft palate, base of uvula, tonsillar pillars, and portion of posterior pharyngeal wall visible    Lungs: Lungs Clear with good breath sounds bilaterally    Heart: Normal heart sounds and rate    History and physical reviewed and no updates needed. I have reviewed the lab findings, diagnostic data, medications, and the plan for sedation. I have determined this patient to be an appropriate candidate for the planned sedation and procedure and have reassessed the patient IMMEDIATELY PRIOR to sedation and procedure.    Makayla Oviedo, DIPAK CNP

## 2018-05-24 NOTE — PROGRESS NOTES
"A&O. Tmax 100.2 , BP's continue to be soft, HR mildly tachy at 99 BPM. CIWA score 2. Bilateral neph tubes irrigated per IR nurse, gave orders to irrigate q shift. Has not voided this evening. Pt pulled out IV at 1900, stating \"it's not doing me any good\", next shift to start new IV as pt tolerates. Plan for neph tube removal and bilateral stent placement in AM, NPO at midnight. Pt and family aware. Will continue to monitor.   "

## 2018-05-24 NOTE — PROGRESS NOTES
[facility]    Urology Progress Note     Assessment & Plan   Antonio Ramirez is a 73 year old male who was admitted on 5/21/2018. Bilateral obstructing ureteral stones and urosepsis; S/P bilateral PCN placement-- antegrade ureteral stents placed this AM by IR     Plan: IR placed antegrade ureteral stents this AM, wanted to continue PCNs and remove on 5/29.   Abx x 2wks per ID and hospital team  Follow up in urology office in 2wks for definitive stone and stent management planning    Kaylynn Caballero PA-C  [facility]  [address] [place] [id number]  [phone]  [email or web address]  Text Page (7am to 5pm)    Interval History   Antegrade ureteral stent placed with IR this AM  Denies pain   PCNs to remain open and draining until 5/29 per IR, currently draining dalia urine     AVSS  Creat 1.70-->1.57  WBC 6.2  UCx with >100K klebsiella pneumonia  BCx with klebsiella as well     Physical Exam   Temp: 99  F (37.2  C) Temp src: Oral BP: 100/55   Heart Rate: 69 Resp: 16 SpO2: 95 % O2 Device: Nasal cannula Oxygen Delivery: 2 LPM  Vitals:    05/21/18 2100 05/23/18 0620 05/24/18 0500   Weight: 96.2 kg (212 lb) 99.1 kg (218 lb 6.4 oz) 104.5 kg (230 lb 4.8 oz)     Vital Signs with Ranges  Temp:  [99  F (37.2  C)-100.2  F (37.9  C)] 99  F (37.2  C)  Heart Rate:  [69-99] 69  Resp:  [16-18] 16  BP: (100-135)/(55-86) 100/55  SpO2:  [93 %-99 %] 95 %  I/O last 3 completed shifts:  In: 5205 [P.O.:660; I.V.:4545]  Out: 790 [Urine:790]    Constitutional: Sitting up in bed, NAD  Eyes: no icterus  ENT: normocephalic, atraumatic   Respiratory: breathing unlabored  Cardiovascular: chest wall symmetric   GI: soft, NT, ND, no suprapubic tenderness   Lymph/Hematologic: no pedal edema   Genitourinary: PCNs draining dalia urine   Skin: well perfused   Neurologic: no focal deficits  Neuropsychiatric: A&O    Medications     sodium chloride 50 mL/hr at 05/24/18 1231        carvedilol  3.125 mg Oral BID w/meals     cefTRIAXone  1 g Intravenous Q24H     folic acid  1 mg Oral Daily     gabapentin (NEURONTIN) tablet 600 mg  600 mg Oral BID     sodium chloride (PF)  10 mL Irrigation Q8H     sodium chloride (PF)  10 mL Irrigation Q8H     sodium chloride (PF)  10 mL Irrigation Q8H     sodium chloride (PF)  3 mL Intracatheter Q8H     vitamin  B-1  100 mg Oral Daily       Data   Results for orders placed or performed during the hospital encounter of 05/21/18 (from the past 24 hour(s))   Basic metabolic panel   Result Value Ref Range    Sodium 136 133 - 144 mmol/L    Potassium 4.2 3.4 - 5.3 mmol/L    Chloride 106 94 - 109 mmol/L    Carbon Dioxide 22 20 - 32 mmol/L    Anion Gap 8 3 - 14 mmol/L    Glucose 99 70 - 99 mg/dL    Urea Nitrogen 31 (H) 7 - 30 mg/dL    Creatinine 1.70 (H) 0.66 - 1.25 mg/dL    GFR Estimate 39 (L) >60 mL/min/1.7m2    GFR Estimate If Black 48 (L) >60 mL/min/1.7m2    Calcium 7.4 (L) 8.5 - 10.1 mg/dL   Basic metabolic panel   Result Value Ref Range    Sodium 138 133 - 144 mmol/L    Potassium 4.0 3.4 - 5.3 mmol/L    Chloride 110 (H) 94 - 109 mmol/L    Carbon Dioxide 18 (L) 20 - 32 mmol/L    Anion Gap 10 3 - 14 mmol/L    Glucose 81 70 - 99 mg/dL    Urea Nitrogen 32 (H) 7 - 30 mg/dL    Creatinine 1.57 (H) 0.66 - 1.25 mg/dL    GFR Estimate 43 (L) >60 mL/min/1.7m2    GFR Estimate If Black 52 (L) >60 mL/min/1.7m2    Calcium 7.4 (L) 8.5 - 10.1 mg/dL   CBC with platelets differential   Result Value Ref Range    WBC 5.4 4.0 - 11.0 10e9/L    RBC Count 3.37 (L) 4.4 - 5.9 10e12/L    Hemoglobin 9.0 (L) 13.3 - 17.7 g/dL    Hematocrit 28.2 (L) 40.0 - 53.0 %    MCV 84 78 - 100 fl    MCH 26.7 26.5 - 33.0 pg    MCHC 31.9 31.5 - 36.5 g/dL    RDW 19.2 (H) 10.0 - 15.0 %    Platelet Count 68 (L) 150 - 450 10e9/L    Diff Method Manual Differential     % Neutrophils 80.0 %    % Lymphocytes 11.0 %    % Monocytes 8.0 %    % Eosinophils 1.0 %    % Basophils 0.0 %    Absolute Neutrophil 4.3 1.6 - 8.3  10e9/L    Absolute Lymphocytes 0.6 (L) 0.8 - 5.3 10e9/L    Absolute Monocytes 0.4 0.0 - 1.3 10e9/L    Absolute Eosinophils 0.1 0.0 - 0.7 10e9/L    Absolute Basophils 0.0 0.0 - 0.2 10e9/L    Teardrop Cells Slight     Acanthocytes Slight     Ovalocytes Slight     Carl Cells Slight     Platelet Estimate       Automated count confirmed.  Platelet morphology is normal.   INR   Result Value Ref Range    INR 1.68 (H) 0.86 - 1.14   Partial thromboplastin time   Result Value Ref Range    PTT 37 22 - 37 sec   Platelet function closure with reflex   Result Value Ref Range    PLT Funct COL/EPI 99 <170 sec

## 2018-05-24 NOTE — PROCEDURES
RADIOLOGY POST PROCEDURE NOTE w/ SEDATION  Patient name: Antonio Ramirez  MRN: [id number]  : 1945    Pre-procedure diagnosis:  Ureterolithiasis  Post-procedure diagnosis: Same    Procedure Date/Time: May 24, 2018  9:59 AM  Procedure: Bilateral nephrostograms, placement of 8 Fr X 26 cm ureteral stents, and replacement of 8 Fr PCNs.  Keep to gravity drainage.  Given urosepsis presentation, and oozing from left PCN site (in context of INR 1.7), will plan for repeat nephrostogram and tube pull next week, when convenient for patient.  Tomorrow is too soon.  Estimated blood loss: None  Specimen(s) collected with description: Please see above.    I determined this patient to be an appropriate candidate for the planned sedation and procedure and reassessed the patient IMMEDIATELY PRIOR to sedation and procedure.     The patient tolerated the procedure well with no immediate complications.  Significant findings:  Please see above.    See imaging dictation for procedural details.    Provider name: Patricio Dye  Assistant(s):None

## 2018-05-25 ENCOUNTER — APPOINTMENT (OUTPATIENT)
Dept: PHYSICAL THERAPY | Facility: CLINIC | Age: 73
DRG: 872 | End: 2018-05-25
Payer: MEDICARE

## 2018-05-25 LAB
ANION GAP SERPL CALCULATED.3IONS-SCNC: 9 MMOL/L (ref 3–14)
BACTERIA SPEC CULT: ABNORMAL
BACTERIA SPEC CULT: ABNORMAL
BASOPHILS # BLD AUTO: 0 10E9/L (ref 0–0.2)
BASOPHILS NFR BLD AUTO: 0.6 %
BUN SERPL-MCNC: 30 MG/DL (ref 7–30)
CALCIUM SERPL-MCNC: 7.8 MG/DL (ref 8.5–10.1)
CHLORIDE SERPL-SCNC: 111 MMOL/L (ref 94–109)
CO2 SERPL-SCNC: 19 MMOL/L (ref 20–32)
CREAT SERPL-MCNC: 1.4 MG/DL (ref 0.66–1.25)
DIFFERENTIAL METHOD BLD: ABNORMAL
EOSINOPHIL # BLD AUTO: 0.1 10E9/L (ref 0–0.7)
EOSINOPHIL NFR BLD AUTO: 1.4 %
ERYTHROCYTE [DISTWIDTH] IN BLOOD BY AUTOMATED COUNT: 19.1 % (ref 10–15)
GFR SERPL CREATININE-BSD FRML MDRD: 49 ML/MIN/1.7M2
GLUCOSE SERPL-MCNC: 80 MG/DL (ref 70–99)
HCT VFR BLD AUTO: 27.7 % (ref 40–53)
HGB BLD-MCNC: 8.9 G/DL (ref 13.3–17.7)
IMM GRANULOCYTES # BLD: 0 10E9/L (ref 0–0.4)
IMM GRANULOCYTES NFR BLD: 0 %
LYMPHOCYTES # BLD AUTO: 1 10E9/L (ref 0.8–5.3)
LYMPHOCYTES NFR BLD AUTO: 18.5 %
Lab: ABNORMAL
MCH RBC QN AUTO: 26.8 PG (ref 26.5–33)
MCHC RBC AUTO-ENTMCNC: 32.1 G/DL (ref 31.5–36.5)
MCV RBC AUTO: 83 FL (ref 78–100)
MONOCYTES # BLD AUTO: 0.9 10E9/L (ref 0–1.3)
MONOCYTES NFR BLD AUTO: 17.7 %
NEUTROPHILS # BLD AUTO: 3.2 10E9/L (ref 1.6–8.3)
NEUTROPHILS NFR BLD AUTO: 61.8 %
NRBC # BLD AUTO: 0 10*3/UL
NRBC BLD AUTO-RTO: 0 /100
PLATELET # BLD AUTO: 76 10E9/L (ref 150–450)
POTASSIUM SERPL-SCNC: 4.1 MMOL/L (ref 3.4–5.3)
RBC # BLD AUTO: 3.32 10E12/L (ref 4.4–5.9)
SODIUM SERPL-SCNC: 139 MMOL/L (ref 133–144)
SPECIMEN SOURCE: ABNORMAL
WBC # BLD AUTO: 5.1 10E9/L (ref 4–11)

## 2018-05-25 PROCEDURE — A9270 NON-COVERED ITEM OR SERVICE: HCPCS | Mod: GY | Performed by: PHYSICIAN ASSISTANT

## 2018-05-25 PROCEDURE — 25000132 ZZH RX MED GY IP 250 OP 250 PS 637: Mod: GY | Performed by: HOSPITALIST

## 2018-05-25 PROCEDURE — 99232 SBSQ HOSP IP/OBS MODERATE 35: CPT | Performed by: HOSPITALIST

## 2018-05-25 PROCEDURE — A9270 NON-COVERED ITEM OR SERVICE: HCPCS | Mod: GY | Performed by: HOSPITALIST

## 2018-05-25 PROCEDURE — 12000000 ZZH R&B MED SURG/OB

## 2018-05-25 PROCEDURE — 85025 COMPLETE CBC W/AUTO DIFF WBC: CPT | Performed by: HOSPITALIST

## 2018-05-25 PROCEDURE — 36415 COLL VENOUS BLD VENIPUNCTURE: CPT | Performed by: HOSPITALIST

## 2018-05-25 PROCEDURE — 25000132 ZZH RX MED GY IP 250 OP 250 PS 637: Mod: GY | Performed by: PHYSICIAN ASSISTANT

## 2018-05-25 PROCEDURE — 80048 BASIC METABOLIC PNL TOTAL CA: CPT | Performed by: HOSPITALIST

## 2018-05-25 PROCEDURE — 40000193 ZZH STATISTIC PT WARD VISIT

## 2018-05-25 PROCEDURE — 25000128 H RX IP 250 OP 636: Performed by: HOSPITALIST

## 2018-05-25 PROCEDURE — 97530 THERAPEUTIC ACTIVITIES: CPT | Mod: GP

## 2018-05-25 RX ORDER — PHENAZOPYRIDINE HYDROCHLORIDE 100 MG/1
100 TABLET, FILM COATED ORAL 3 TIMES DAILY PRN
Status: DISCONTINUED | OUTPATIENT
Start: 2018-05-25 | End: 2018-05-27 | Stop reason: HOSPADM

## 2018-05-25 RX ORDER — GABAPENTIN 600 MG/1
600 TABLET ORAL 3 TIMES DAILY
Status: DISCONTINUED | OUTPATIENT
Start: 2018-05-25 | End: 2018-05-27 | Stop reason: HOSPADM

## 2018-05-25 RX ORDER — FLUTICASONE PROPIONATE 50 MCG
2 SPRAY, SUSPENSION (ML) NASAL DAILY
Status: DISCONTINUED | OUTPATIENT
Start: 2018-05-25 | End: 2018-05-27 | Stop reason: HOSPADM

## 2018-05-25 RX ADMIN — Medication 100 MG: at 08:37

## 2018-05-25 RX ADMIN — FLUTICASONE PROPIONATE 2 SPRAY: 50 SPRAY, METERED NASAL at 11:30

## 2018-05-25 RX ADMIN — OXYCODONE HYDROCHLORIDE 5 MG: 5 TABLET ORAL at 08:46

## 2018-05-25 RX ADMIN — FOLIC ACID 1 MG: 1 TABLET ORAL at 08:37

## 2018-05-25 RX ADMIN — SODIUM CHLORIDE: 9 INJECTION, SOLUTION INTRAVENOUS at 06:06

## 2018-05-25 RX ADMIN — GABAPENTIN 600 MG: 600 TABLET, FILM COATED ORAL at 08:37

## 2018-05-25 RX ADMIN — GABAPENTIN 600 MG: 600 TABLET, FILM COATED ORAL at 18:49

## 2018-05-25 RX ADMIN — OXYCODONE HYDROCHLORIDE 5 MG: 5 TABLET ORAL at 01:39

## 2018-05-25 RX ADMIN — PHENAZOPYRIDINE HYDROCHLORIDE 100 MG: 100 TABLET ORAL at 11:30

## 2018-05-25 RX ADMIN — CEFTRIAXONE 1 G: 1 INJECTION, POWDER, FOR SOLUTION INTRAMUSCULAR; INTRAVENOUS at 21:25

## 2018-05-25 RX ADMIN — CARVEDILOL 3.12 MG: 3.12 TABLET, FILM COATED ORAL at 08:37

## 2018-05-25 RX ADMIN — CARVEDILOL 3.12 MG: 3.12 TABLET, FILM COATED ORAL at 18:49

## 2018-05-25 RX ADMIN — OXYCODONE HYDROCHLORIDE 5 MG: 5 TABLET ORAL at 21:25

## 2018-05-25 NOTE — DISCHARGE INSTRUCTIONS
Mr Ramirez has an IR appointment on Tuesday 5/29/18 at 8am. Check in at the [facility] at 7am.      The TCU will need to turn off the stopcocks to each nephrostomy tube 24 hours prior to the patient's appointment in IR to see if he tolerates antegrade passage of urine.      Please turn off stopcocks to each nephrostomy tube at 7am on Monday 5/28/18. They may be opened if patient has increased pain, nausea, fevers or leakage around the nephrostomy tubes.

## 2018-05-25 NOTE — PROGRESS NOTES
Mr Ramirez has an IR appointment on Tuesday 5/29/18 at 8am. [facility].     The TCU will need to turn off the stopcocks to each nephrostomy tube 24 hours prior to the patient's appointment in IR to see if he tolerates antegrade passage of urine.     Please turn off stopcocks to each nephrostomy tube at 7am on Monday 5/28/18. They may be opened if patient has increased pain, nausea, fevers or leakage around the nephrostomy tubes.     Thanks [facility] Interventional Radiology CNP ([phone])

## 2018-05-25 NOTE — PROVIDER NOTIFICATION
MD Notification    Notified Person: MD urology on-call    Notified Person Name: Dr. Bello    Notification Date/Time: 05/24/18, 8:17 PM    Notification Interaction: Telephone with [employer]    Purpose of Notification: Pt feeling the need to urinate, but unable to. Bladder scans are <50cc.  Pt refusing IVF d/t urgency.     Orders Received: Continue to monitor. Manage pain if needed.  Need to continue IV fluids.    Comments:

## 2018-05-25 NOTE — CONSULTS
Care Transition Initial Assessment - RN        Met with: Patient and wife.  DATA   Active Problems:    UTI (urinary tract infection)       Cognitive Status: awake, alert and oriented.        Contact information and PCP information verified: Yes  Lives With: spouse  Living Arrangements: apartment                 Insurance concerns: Insurance Plan  ASSESSMENT  Patient currently receives the following services:  Prior to coming into the hospital, he was open to [facility]. He also has a lot of cormobidities that could delay his recovery.          Identified issues/concerns regarding health management: Getting back to his baseline with mobilization. Currently his prosthetic leg does not fit properly secondary to his leg being too swollen.     PLAN  Financial costs for the patient include; transportation (wife hasn't decided if family will transport) .  Patient given options and choices for discharge: yes, Referral sent to ARU and to 3 [facility] ([facility] .  Patient/family is agreeable to the plan?  Yes  Patient anticipates discharging to either ARU or a TCU.        Patient anticipates needs for home equipment: He may need a walker.  Plan/Disposition: Not finalized yet. Waiting to hear first if ARU will accept patient with the understanding that patient will need to have his bilateral nephrostomy tubes removed on Tuesday, May 29, 2018. If he does not get accepted at ARU, then TCUs will be persued.  Appointments: Tuesday May 29, 2018 he has appointment at [facility] to have his Bilateral Nephrostomy Tubes removed.     Care  (CTS) will continue to follow as needed.

## 2018-05-25 NOTE — PROGRESS NOTES
Received a call from Von the liaison for ARU that patient has been denied placement. He explained that the policy stands that patient's can not leave their facility for procedures. Patient would need to leave their facility to have his nephrostomy tubes removed. Will plan for TCU placement. Patient informed. Call placed to patient's wife to inform her of plan.

## 2018-05-25 NOTE — PROGRESS NOTES
[facility]    Hospitalist Progress Note  Date of Service (when I saw the patient): 05/25/2018    Assessment & Plan   Antonio Ramirez is a 73 year old male with complex past history recent traumatic subdural hematoma (4/7/18), alcoholic cirrhosis, chronic kidney disease, hypertension, CAD, atrial fibrillation, CHF among other chronic medical issues who presented with generalized weakness and found to have UTI.     Klebsiella bacteremia/sepsis due to UTI  Presented with fever and a lactate of 2.2 but no other SIRS criteria.  Abnormal UA with pyuria and leukocyte esterase.  Received zosyn empirically in ED due to sepsis.  -Switched to ceftriaxone on admission, Patient was hypotensive after admission despite fluid challenge, CT abdomen pelvis showed bilateral ureteric stones with hydronephrosis, I discussed with urology and IR, and percutaneous nephrostomy tubes placed 5/22 then ureteric stents placed 5/24.  - blood culture as well as urine culture grew klebsiella pneumoniae, sensitivity noted. Rocephin while in hospital, will change to ceftin or augmentin at discharge (curbsided with ID, PO abx ok, has cipro allergy), 2 weeks course total given bacteremia.  - Plan is to perform nephrostogram next week and pull the tube leaving stents  - Urology following.     Acute kidney injury on CKD stage II due to obstruction  Bilateral ureteric stones with hydro-nephrosis:  Baseline creatinine 0.9-1.0.  Admission Cr 1.22.  - Cr worsened peaked at 1.85, suspect component of sepsis and prerenal as well given FENa less than 0.1  - received multiple IV fluid boluses then maintenance, Cr started trending down, now 1.4, expect it will continue to improve with the decompression. Dc IVF. On demadex PTA, resume in 1-2 days once Cr near baseline.   - Continue to monitor intake and output, encourage PO.  - BP stable currently, urine output not as expected for post obs diuresis/ or despite IVF. Tubes are patent.   - Pain  management with Tylenol and as needed oxycodone. minimize narcotic as able.    Alcoholic cirrhosis  Alcohol abuse with withdrawal: Continues to drink daily.  Bilirubin mildly elevated LFTs otherwise within normal limits.  - CIWA protocol initiated, scoring low now, has not needed Valium >3 days  - possibly have small ascites on clinical exam, non tender, no concern for SBP   - Changed banana back to p.o. multivitamins thiamine and folic acid.     CAD status post CABG ×3 in 2007  Atrial fibrillation  Hypertension,  with hypotensive episodes.  TTE in January 2018 shows ejection fraction 40-45%, regional wall motion abnormalities, moderately dilated RV with decreased RV systolic function, moderate MR, mild AS and pulmonary hypertension.  - Continue carvedilol with hold parameters, holding diuretic- resume in 1-2 days if Cr continues to improve  - Continue to hold clonidine.     Acute on Chronic anemia and thrombocytopenia: Suspect due to cirrhosis.  Baseline hemoglobin approximately 10-11 g/dL, baseline plt 130-150.  - hgb and platelets have worsened due to sepsis and also due to blood loss from procedure, platelet stable/slightly better.  - also blood tinged urine in the bags but no gross blood or clots.  - Hb 8.9, monitor.     Traumatic subdural hematoma:  Healing well, he has been cleared to restart anticoagulation it is awaiting clearance from his outpatient oncologist.     History of DVT PE status post IVC filter, anticardiolipin antibody:  Anticoagulation as noted above.    # Pain Assessment:  Current Pain Score 5/25/2018   Patient currently in pain? yes   Pain score (0-10) 7   Pain location Groin   Pain descriptors -   CPOT pain score -   - Antonio is experiencing pain due to ureteric stones and nephrostomy tubes. Pain management was discussed and the plan was created in a collaborative fashion.  Antonio's response to the current recommendations: mixed response  - Please see the plan for pain management as  documented above     DVT Prophylaxis: Pneumatic Compression Devices    Code Status: Full Code    Disposition: Expected discharge: stable for dc, pending TCU/ARU bed availability, discussed with patient and RN, discussed with CC.     Alejandra Castillo MD  Hospitalist    Interval History    Patient was seen and examined, was angry and upset that the plan is to discharge him from hospital with the tubes/catheters. Feels he/his wife can't manage it but again declined option of TCU/ARU discharge initially. At length meeting with care co ordinator and now agreeable to go to rehab.   - Tmax 100.4  - denies pain or dyspnea.     -Data reviewed today: I reviewed all new labs results over the last 24 hours, retrograde ureteric stents placed.    Physical Exam   Temp: 97  F (36.1  C) Temp src: Oral BP: 115/65   Heart Rate: 72 Resp: 16 SpO2: 95 % O2 Device: None (Room air)    Vitals:    05/23/18 0620 05/24/18 0500 05/25/18 0229   Weight: 99.1 kg (218 lb 6.4 oz) 104.5 kg (230 lb 4.8 oz) 105.7 kg (233 lb 1.6 oz)     Vital Signs with Ranges  Temp:  [97  F (36.1  C)-100.4  F (38  C)] 97  F (36.1  C)  Heart Rate:  [70-78] 72  Resp:  [16-18] 16  BP: ()/(56-77) 115/65  SpO2:  [94 %-95 %] 95 %  I/O last 3 completed shifts:  In: 2132 [P.O.:450; I.V.:1682]  Out: 1045 [Urine:1045]    Constitutional: AAOx3, comfortable.  HEENT: PERRLA EOMI, healed scar on the Rt parietal scalp. MMM.  Respiratory: Bilateral equal air entry, clear to auscultation. No respiratory distress.  Cardiovascular: Regular s1s2, no murmur, rub or gallop. No tachycardia.  GI: Soft, minimally distended, non tender, bowel tones active.  Skin/Integumen: No rash, no blister.  MSK: Lt BKA  : Bilateral percutaneous nephrostomy tubes in place with bags. Draining blood tinged urine- much less dark today, no clots.  Neuro: Oriented ×3, follows verbal commands appropriately, CN 2-12 intact.    Medications       carvedilol  3.125 mg Oral BID w/meals     cefTRIAXone  1 g  Intravenous Q24H     fluticasone  2 spray Both Nostrils Daily     folic acid  1 mg Oral Daily     gabapentin (NEURONTIN) tablet 600 mg  600 mg Oral BID     sodium chloride (PF)  10 mL Irrigation Q8H     sodium chloride (PF)  10 mL Irrigation Q8H     sodium chloride (PF)  3 mL Intracatheter Q8H     vitamin  B-1  100 mg Oral Daily       Data     Recent Labs  Lab 05/25/18  0707 05/24/18  0700 05/23/18  1600 05/23/18  0630  05/22/18  0737 05/21/18  1719   WBC 5.1 5.4  --  6.7  --  10.6 7.0   HGB 8.9* 9.0*  --  9.5*  --  10.0* 11.0*   MCV 83 84  --  85  --  84 83   PLT 76* 68*  --  83*  --  80* 119*   INR  --  1.68*  --   --   --   --   --     138 136 139  < > 139 138   POTASSIUM 4.1 4.0 4.2 4.0  < > 3.9 4.0   CHLORIDE 111* 110* 106 109  < > 109 104   CO2 19* 18* 22 23  < > 22 25   BUN 30 32* 31* 31*  < > 23 19   CR 1.40* 1.57* 1.70* 1.75*  < > 1.69* 1.22   ANIONGAP 9 10 8 7  < > 8 9   BENNIE 7.8* 7.4* 7.4* 7.6*  < > 8.1* 9.0   GLC 80 81 99 99  < > 106* 118*   ALBUMIN  --   --   --   --   --  2.8* 3.2*   PROTTOTAL  --   --   --   --   --  6.6* 7.5   BILITOTAL  --   --   --   --   --  1.5* 1.7*   ALKPHOS  --   --   --   --   --  89 105   ALT  --   --   --   --   --  16 12   AST  --   --   --   --   --  41 32   TROPI  --   --   --   --   --   --  0.027   < > = values in this interval not displayed.    No results found for this or any previous visit (from the past 24 hour(s)).

## 2018-05-25 NOTE — PROGRESS NOTES
Care Transition Initial Assessment - SW  Reason For Consult: discharge planning  Met with: chart review   Active Problems:    UTI (urinary tract infection)       DATA  Lives With: spouse  Living Arrangements: apartment  Description of Support System: Supportive, Involved  Who is your support system?: Wife, Children  Support Assessment: Adequate family and caregiver support.   Identified issues/concerns regarding health management: SW following for d/c needs   Quality Of Family Relationships: supportive, involved     ASSESSMENT  Cognitive Status: Alert and oriented x4, agitated/hostile at times  Concerns to be addressed: Patient is a 73 year old male who was admitted to the hospital for UTI. Prior to hospitalization patient was living at home with spouse. Patient was seen by therapy and they are recommending TCU at d/c. CC updated SW who stated patient/spouse would like referrals sent to [facility]. SW sent referral and will need to update patient and spouse once assessments are complete.      PLAN  Financial costs for the patient includes  Patient given options and choices for discharge to TCU  Patient/family is agreeable to the plan?  YES  Patient Goals and Preferences:[facility].   Patient anticipates discharging to: TCU    TREMAYNE Diallo   *[phone]

## 2018-05-25 NOTE — PROGRESS NOTES
[facility]    Urology Progress Note     Assessment & Plan   Antonio Ramirez is a 73 year old male who was admitted on 5/21/2018. Bilateral obstructing ureteral stones and urosepsis-- improving; Bilateral PCNs placed on 5/22 by IR, antegrade ureteral stents placed yesterday     Plan: IR planning to remove PCNs on 5/29  Follow up with Dr. Hunter in urology office in 2 weeks for definitive stone and stent management   Abx x 2wks per ID an hospital team   Can try some pyridium for urgency/frequency, would opt not to start ditropan given baseline retention issues     Kaylynn Caballero PA-C  [facility]  [address] [place] [address] [id number] [phone]  [email or web address]  Text Page (7am to 5pm)    Interval History   Patient very agitated about his current situation and concerns with discharge planning   Urinary urgency/frequency with stents, PVRs have been low  PCNs draining dalia urine   Creat normalizing; 1.7-->1.4    Physical Exam   Temp: 97  F (36.1  C) Temp src: Oral BP: 115/65   Heart Rate: 72 Resp: 16 SpO2: 95 % O2 Device: None (Room air)    Vitals:    05/23/18 0620 05/24/18 0500 05/25/18 0229   Weight: 99.1 kg (218 lb 6.4 oz) 104.5 kg (230 lb 4.8 oz) 105.7 kg (233 lb 1.6 oz)     Vital Signs with Ranges  Temp:  [97  F (36.1  C)-100.4  F (38  C)] 97  F (36.1  C)  Heart Rate:  [69-78] 72  Resp:  [16-18] 16  BP: ()/(55-77) 115/65  SpO2:  [94 %-95 %] 95 %  I/O last 3 completed shifts:  In: 2132 [P.O.:450; I.V.:1682]  Out: 1045 [Urine:1045]    Constitutional: Sitting up in bed, agitated  Eyes: no icterus  ENT: normocephalic  Respiratory: breathing unlabored  Cardiovascular: chest wall symmetric   GI: soft, NT, ND  Lymph/Hematologic: no pedal edema  Genitourinary: PCNs draining dalia urine  Skin: well perfused  Neurologic: no focal deficits  Neuropsychiatric: A&O    Medications       carvedilol  3.125 mg Oral BID w/meals     cefTRIAXone  1 g  Intravenous Q24H     folic acid  1 mg Oral Daily     gabapentin (NEURONTIN) tablet 600 mg  600 mg Oral BID     sodium chloride (PF)  10 mL Irrigation Q8H     sodium chloride (PF)  10 mL Irrigation Q8H     sodium chloride (PF)  3 mL Intracatheter Q8H     vitamin  B-1  100 mg Oral Daily       Data   Results for orders placed or performed during the hospital encounter of 05/21/18 (from the past 24 hour(s))   Basic metabolic panel   Result Value Ref Range    Sodium 139 133 - 144 mmol/L    Potassium 4.1 3.4 - 5.3 mmol/L    Chloride 111 (H) 94 - 109 mmol/L    Carbon Dioxide 19 (L) 20 - 32 mmol/L    Anion Gap 9 3 - 14 mmol/L    Glucose 80 70 - 99 mg/dL    Urea Nitrogen 30 7 - 30 mg/dL    Creatinine 1.40 (H) 0.66 - 1.25 mg/dL    GFR Estimate 49 (L) >60 mL/min/1.7m2    GFR Estimate If Black 60 (L) >60 mL/min/1.7m2    Calcium 7.8 (L) 8.5 - 10.1 mg/dL   CBC with platelets differential   Result Value Ref Range    WBC 5.1 4.0 - 11.0 10e9/L    RBC Count 3.32 (L) 4.4 - 5.9 10e12/L    Hemoglobin 8.9 (L) 13.3 - 17.7 g/dL    Hematocrit 27.7 (L) 40.0 - 53.0 %    MCV 83 78 - 100 fl    MCH 26.8 26.5 - 33.0 pg    MCHC 32.1 31.5 - 36.5 g/dL    RDW 19.1 (H) 10.0 - 15.0 %    Platelet Count 76 (L) 150 - 450 10e9/L    Diff Method Automated Method     % Neutrophils 61.8 %    % Lymphocytes 18.5 %    % Monocytes 17.7 %    % Eosinophils 1.4 %    % Basophils 0.6 %    % Immature Granulocytes 0.0 %    Nucleated RBCs 0 0 /100    Absolute Neutrophil 3.2 1.6 - 8.3 10e9/L    Absolute Lymphocytes 1.0 0.8 - 5.3 10e9/L    Absolute Monocytes 0.9 0.0 - 1.3 10e9/L    Absolute Eosinophils 0.1 0.0 - 0.7 10e9/L    Absolute Basophils 0.0 0.0 - 0.2 10e9/L    Abs Immature Granulocytes 0.0 0 - 0.4 10e9/L    Absolute Nucleated RBC 0.0

## 2018-05-26 PROCEDURE — 25000132 ZZH RX MED GY IP 250 OP 250 PS 637: Mod: GY | Performed by: PHYSICIAN ASSISTANT

## 2018-05-26 PROCEDURE — 40000894 ZZH STATISTIC OT IP EVAL DEFER: Performed by: OCCUPATIONAL THERAPIST

## 2018-05-26 PROCEDURE — A9270 NON-COVERED ITEM OR SERVICE: HCPCS | Mod: GY | Performed by: HOSPITALIST

## 2018-05-26 PROCEDURE — 25000128 H RX IP 250 OP 636: Performed by: HOSPITALIST

## 2018-05-26 PROCEDURE — 12000000 ZZH R&B MED SURG/OB

## 2018-05-26 PROCEDURE — A9270 NON-COVERED ITEM OR SERVICE: HCPCS | Mod: GY | Performed by: PHYSICIAN ASSISTANT

## 2018-05-26 PROCEDURE — 25000132 ZZH RX MED GY IP 250 OP 250 PS 637: Mod: GY | Performed by: HOSPITALIST

## 2018-05-26 PROCEDURE — 99232 SBSQ HOSP IP/OBS MODERATE 35: CPT | Performed by: INTERNAL MEDICINE

## 2018-05-26 RX ADMIN — Medication 100 MG: at 09:26

## 2018-05-26 RX ADMIN — CARVEDILOL 3.12 MG: 3.12 TABLET, FILM COATED ORAL at 19:27

## 2018-05-26 RX ADMIN — PHENAZOPYRIDINE HYDROCHLORIDE 100 MG: 100 TABLET ORAL at 00:52

## 2018-05-26 RX ADMIN — GABAPENTIN 600 MG: 600 TABLET, FILM COATED ORAL at 19:27

## 2018-05-26 RX ADMIN — GABAPENTIN 600 MG: 600 TABLET, FILM COATED ORAL at 09:26

## 2018-05-26 RX ADMIN — PHENAZOPYRIDINE HYDROCHLORIDE 100 MG: 100 TABLET ORAL at 09:25

## 2018-05-26 RX ADMIN — CARVEDILOL 3.12 MG: 3.12 TABLET, FILM COATED ORAL at 09:26

## 2018-05-26 RX ADMIN — GABAPENTIN 600 MG: 600 TABLET, FILM COATED ORAL at 12:24

## 2018-05-26 RX ADMIN — OXYCODONE HYDROCHLORIDE 5 MG: 5 TABLET ORAL at 19:27

## 2018-05-26 RX ADMIN — FOLIC ACID 1 MG: 1 TABLET ORAL at 09:26

## 2018-05-26 RX ADMIN — FLUTICASONE PROPIONATE 2 SPRAY: 50 SPRAY, METERED NASAL at 09:26

## 2018-05-26 RX ADMIN — CEFTRIAXONE 1 G: 1 INJECTION, POWDER, FOR SOLUTION INTRAMUSCULAR; INTRAVENOUS at 22:02

## 2018-05-26 RX ADMIN — PHENAZOPYRIDINE HYDROCHLORIDE 100 MG: 100 TABLET ORAL at 17:59

## 2018-05-26 NOTE — PROGRESS NOTES
UROLOGY  FOLLOW UP KLEBSIELLA BACTEREMIA, SEPSIS, BILATERAL RENAL OBSTRUCTION  S/P BILATERAL PCN, SUBSEQUENT INTERNAL STENTS  ON ROCEPHIN  FEELING BETTER.  AVSS AWAKE ALERT, NAD,PCN CDI    IMPRESSION/PLAN:  1. UROSEPSIS. WILL NEED 2 WEEKS IV/ORAL ANTIBIOTICS  2. DEFINITIVE STONE THERAPY AT 2-3 WEEKS. DISCUSSED ESWL VS BILATERAL URETEROSCOPY, LASER, STONE REMOVAL AND STENT EXCHANGE.  HE PREFERS LATTER. RISK, POSSIBLE COMPLICATIONS DISCUSSED.   3. CAN DC TO HOME OR CARE UNIT THIS WEEKEND. SCHEDULED FOR NEPHROSTOGRAMS ON Tuesday +/- REMOVAL.

## 2018-05-26 NOTE — PROGRESS NOTES
[facility]    Hospitalist Progress Note    Assessment & Plan   Antonio Ramirez is a 73 year old male with PMHx of recent traumatic subdural hematoma (4/7/18), alcoholic cirrhosis, CKD, hypertension, CAD, afib and CHF among other medical problems who was admitted on 5/21/2018 with generalized weakness and was subsequently found to have a UTI and klebsiella bacteremia.       Klebsiella bacteremia/sepsis due to UTI  Presented with fever and a lactate of 2.2 but no other SIRS criteria.  Abnormal UA with pyuria and leukocyte esterase.  Received zosyn empirically in ED due to sepsis. Was hypotensive after admission despite fluid challenge. Switched to ceftriaxone on admission. CT abd/pelvis showed bilateral ureteric stones with hydronephrosis. Discussed with urology and IR -- had percutaneous nephrostomy tubes placed 5/22 and ureteric stents placed 5/24. Urine culture from admission and 2/2 blood cultures ultimately grew klebsiella pneumoniae.   -- cont Rocephin while in the hospital, anticipate changing to Ceftin or Augmentin at discharge for total of 2wk course of treatment (curbsided with ID, PO abx ok, has cipro allergy)  -- urology following      Acute kidney injury on CKD stage II due to obstruction, s/p bilateral nephrostomy tube placement on 5/22  Bilateral ureteric stones with hydronephrosis:  Baseline creatinine 0.9 - 1.0.  Admission Cr 1.22 on admission, peaked at 1.85 this stay. Suspect component of sepsis and prerenal etiology (FENa <0.1). Given multiple IVF boluses on admission then started on maintenance IVFs.   -- Cr improving with IVFs this stay, IVFs dc'd 5/25  -- now with nephrostomy tubes as above, mgmt per urology and IR  -- plan per IR is to perform nephrostogram next week and pull the tubes (will need to turn off the stopcocks 24h prior to appointment to see if he tolerates passage of urine)  -- monitoring urine ouput, encourage po intake  -- pain management with Tylenol and prn  oxycodone -- minimize narcotic use as able  -- will need to follow up with urology after discharge for additional procedures/treatment    Alcoholic cirrhosis  Alcohol abuse with withdrawal:   Continues to drink daily.  Bilirubin mildly elevated on admission (3.2), LFTs otherwise within normal limits. Thought to have some small ascites on exam but abd non-tender and clinical suspicion for SBP was low.  Initially monitored on CIWA protocol, no s/sx of withdrawal noted so this was dc'd.   -- cont MVI, thiamine and folate  -- resume PTA diuretics once renal function back to baseline    CAD status post CABG ×3 in 2007  Atrial fibrillation  Hypertension, with hypotensive episodes.  Echo in 1/2018 showed EF 40-45%, regional wall motion abnormalities, moderately dilated RV with decreased RV systolic function, moderate MR, mild AS and pulmonary hypertension.  -- conts on PTA meds including Coreg 3.125mg BID  -- PTA torsemide 20mg daily on hold given need for IVFs this stay -- anticipate resumption in the next 1-2d.    Acute on Chronic anemia and thrombocytopenia:   Suspect due to alcoholic cirrhosis. Baseline hemoglobin approximately 10-11, baseline plt 130-150. Hgb and platelets dropped this stay secondary to sepsis and procedures. Some blood tinged urine noted following procedures but no clots.  -- monitoring counts periodically    Traumatic subdural hematoma in 4/2018:    Healing well, he has been cleared to restart anticoagulation it is awaiting clearance from his outpatient oncologist.      History of DVT PE status post IVC filter, anticardiolipin antibody:    Anticoagulation on hold for now as noted above.    Pain Assessment:  Current Pain Score 5/26/2018   Patient currently in pain? denies   Pain score (0-10) -   Pain location -   Pain descriptors -   CPOT pain score -   Antonio mendez pain level was assessed and he currently denies pain.      FEN: no IVFs, lytes stable, regular diet  DVT Prophylaxis: PCDs  Code Status:  Full Code    Disposition: Anticipate discharge to TCU in next 1-2d, once placement found. SW assisting with discharge planning.     Wife at bedside and updated on care plan.    Omaira Coleman    Interval History   Seen this afternoon. Feeling okay. No specific complaints. Eating lunch. Denies abd pain/n/v. No cp/sob. Having less urine output from R nephrostomy tube as compared to the L. Urine less bloody-appearing today.     -Data reviewed today: I reviewed all new labs and imaging results over the last 24 hours. I personally reviewed no images or EKG's today.    Physical Exam   Temp: 97  F (36.1  C) Temp src: Axillary BP: 109/67   Heart Rate: 67 Resp: 16 SpO2: 95 % O2 Device: None (Room air)    Vitals:    05/23/18 0620 05/24/18 0500 05/25/18 0229   Weight: 99.1 kg (218 lb 6.4 oz) 104.5 kg (230 lb 4.8 oz) 105.7 kg (233 lb 1.6 oz)     Vital Signs with Ranges  Temp:  [95.7  F (35.4  C)-97  F (36.1  C)] 97  F (36.1  C)  Heart Rate:  [61-67] 67  Resp:  [16] 16  BP: (108-113)/(60-67) 109/67  SpO2:  [95 %-96 %] 95 %  I/O last 3 completed shifts:  In: 220 [I.V.:220]  Out: 1130 [Urine:1130]    Constitutional: Resting comfortably, alert and conversing appropriately, NAD  Respiratory: CTAB, no wheeze/rales/rhonchi, no increased work of breathing  Cardiovascular: HRRR, no MGR, trace RLE edema  GI: S, NT, ND, +BS  Skin/Integumen: warm/dry  Other:  +bilateral nephrostomy tubes draining yellow urine with reddish tinge    Medications       carvedilol  3.125 mg Oral BID w/meals     cefTRIAXone  1 g Intravenous Q24H     fluticasone  2 spray Both Nostrils Daily     folic acid  1 mg Oral Daily     gabapentin (NEURONTIN) tablet 600 mg  600 mg Oral TID     sodium chloride (PF)  10 mL Irrigation Q8H     sodium chloride (PF)  10 mL Irrigation Q8H     sodium chloride (PF)  3 mL Intracatheter Q8H     vitamin  B-1  100 mg Oral Daily       Data     Recent Labs  Lab 05/25/18  0707 05/24/18  0700 05/23/18  1600 05/23/18  0630   05/22/18  0737 05/21/18  1719   WBC 5.1 5.4  --  6.7  --  10.6 7.0   HGB 8.9* 9.0*  --  9.5*  --  10.0* 11.0*   MCV 83 84  --  85  --  84 83   PLT 76* 68*  --  83*  --  80* 119*   INR  --  1.68*  --   --   --   --   --     138 136 139  < > 139 138   POTASSIUM 4.1 4.0 4.2 4.0  < > 3.9 4.0   CHLORIDE 111* 110* 106 109  < > 109 104   CO2 19* 18* 22 23  < > 22 25   BUN 30 32* 31* 31*  < > 23 19   CR 1.40* 1.57* 1.70* 1.75*  < > 1.69* 1.22   ANIONGAP 9 10 8 7  < > 8 9   BENNIE 7.8* 7.4* 7.4* 7.6*  < > 8.1* 9.0   GLC 80 81 99 99  < > 106* 118*   ALBUMIN  --   --   --   --   --  2.8* 3.2*   PROTTOTAL  --   --   --   --   --  6.6* 7.5   BILITOTAL  --   --   --   --   --  1.5* 1.7*   ALKPHOS  --   --   --   --   --  89 105   ALT  --   --   --   --   --  16 12   AST  --   --   --   --   --  41 32   TROPI  --   --   --   --   --   --  0.027   < > = values in this interval not displayed.    No results found for this or any previous visit (from the past 24 hour(s)).

## 2018-05-26 NOTE — PROGRESS NOTES
SW:  I: SW following for discharge & placement needs.  D: PC to [facility] to f/u on referral, referral was still being looked at, notes of agitation however nursing reports that has subsided no longer as agitated. Will likely d/c on neph tubes ensure [facility] can meet this need. Requested SW to fax medication list as it was not in the initial referral. SW faxed med list to [phone].    P: Continue to follow for d/c.     ADDENDUM  SW:  I: SW follow for placement.  D: VM received from Edie at [facility] unable to accept pt d/t neph tubes. PC to [facility] to f/u on referral, unable to take pt d/t acuity of needs too high. PC to [facility] to f/u on referral, no male beds available. SW to get more facility options & sent out referrals to selected places.   P: SW to continue to follow for d/c needs.     ADDENDUM  SW:  I: SW follow for discharge.   D: SW updated wife per previous note that wife would like to be informed as updates come in. All three facilities have declined pt at this time d/t needs & no vacancies. Wife briefly discussed other facilities that are in the area or surrounding areas, [place]. SW informed wife of [facility] & [facility]- both in [place]. SW to go see pt & gather other facilities he would like referrals sent to. SW provided list of SNF to pt to look over & decide on other facilities to send referrals to.   P: SW to continue to follow for placement.      ADDENDUM  SW:  I: follow for facility placement.  D: FAHEEM met with pt. Pt would like referrals sent to [facility], & [facility]. SW sent referrals via DOD to [facility], [facility], [facility]. P/C to [facility] to f/u  On referral still processing. P/[facility] to [facility]  To f/u on referral still processing, P/[facility] to [facility] to f/u on referral no answer no VM setup.  P: SW continue to follow for d/c

## 2018-05-27 VITALS
BODY MASS INDEX: 30.71 KG/M2 | WEIGHT: 231.75 LBS | DIASTOLIC BLOOD PRESSURE: 66 MMHG | HEIGHT: 73 IN | RESPIRATION RATE: 16 BRPM | OXYGEN SATURATION: 96 % | SYSTOLIC BLOOD PRESSURE: 107 MMHG | TEMPERATURE: 97.4 F

## 2018-05-27 LAB
ANION GAP SERPL CALCULATED.3IONS-SCNC: 6 MMOL/L (ref 3–14)
BUN SERPL-MCNC: 28 MG/DL (ref 7–30)
CALCIUM SERPL-MCNC: 8.6 MG/DL (ref 8.5–10.1)
CHLORIDE SERPL-SCNC: 110 MMOL/L (ref 94–109)
CO2 SERPL-SCNC: 21 MMOL/L (ref 20–32)
CREAT SERPL-MCNC: 1.23 MG/DL (ref 0.66–1.25)
ERYTHROCYTE [DISTWIDTH] IN BLOOD BY AUTOMATED COUNT: 19 % (ref 10–15)
GFR SERPL CREATININE-BSD FRML MDRD: 57 ML/MIN/1.7M2
GLUCOSE SERPL-MCNC: 94 MG/DL (ref 70–99)
HCT VFR BLD AUTO: 27.4 % (ref 40–53)
HGB BLD-MCNC: 8.9 G/DL (ref 13.3–17.7)
MCH RBC QN AUTO: 26.9 PG (ref 26.5–33)
MCHC RBC AUTO-ENTMCNC: 32.5 G/DL (ref 31.5–36.5)
MCV RBC AUTO: 83 FL (ref 78–100)
PLATELET # BLD AUTO: 106 10E9/L (ref 150–450)
POTASSIUM SERPL-SCNC: 4.7 MMOL/L (ref 3.4–5.3)
RBC # BLD AUTO: 3.31 10E12/L (ref 4.4–5.9)
SODIUM SERPL-SCNC: 137 MMOL/L (ref 133–144)
WBC # BLD AUTO: 5.2 10E9/L (ref 4–11)

## 2018-05-27 PROCEDURE — 25000132 ZZH RX MED GY IP 250 OP 250 PS 637: Mod: GY | Performed by: INTERNAL MEDICINE

## 2018-05-27 PROCEDURE — 85027 COMPLETE CBC AUTOMATED: CPT | Performed by: INTERNAL MEDICINE

## 2018-05-27 PROCEDURE — A9270 NON-COVERED ITEM OR SERVICE: HCPCS | Mod: GY | Performed by: INTERNAL MEDICINE

## 2018-05-27 PROCEDURE — 25000132 ZZH RX MED GY IP 250 OP 250 PS 637: Mod: GY | Performed by: HOSPITALIST

## 2018-05-27 PROCEDURE — 25000132 ZZH RX MED GY IP 250 OP 250 PS 637: Mod: GY | Performed by: PHYSICIAN ASSISTANT

## 2018-05-27 PROCEDURE — A9270 NON-COVERED ITEM OR SERVICE: HCPCS | Mod: GY | Performed by: HOSPITALIST

## 2018-05-27 PROCEDURE — 80048 BASIC METABOLIC PNL TOTAL CA: CPT | Performed by: INTERNAL MEDICINE

## 2018-05-27 PROCEDURE — 99239 HOSP IP/OBS DSCHRG MGMT >30: CPT | Performed by: INTERNAL MEDICINE

## 2018-05-27 PROCEDURE — 36415 COLL VENOUS BLD VENIPUNCTURE: CPT | Performed by: INTERNAL MEDICINE

## 2018-05-27 PROCEDURE — A9270 NON-COVERED ITEM OR SERVICE: HCPCS | Mod: GY | Performed by: PHYSICIAN ASSISTANT

## 2018-05-27 RX ORDER — FOLIC ACID 1 MG/1
1 TABLET ORAL DAILY
Qty: 30 TABLET | DISCHARGE
Start: 2018-05-28 | End: 2018-07-23

## 2018-05-27 RX ORDER — LANOLIN ALCOHOL/MO/W.PET/CERES
100 CREAM (GRAM) TOPICAL DAILY
DISCHARGE
Start: 2018-05-28 | End: 2018-07-18

## 2018-05-27 RX ORDER — BACITRACIN ZINC AND POLYMYXIN B SULFATE 500; 1000 [USP'U]/G; [USP'U]/G
OINTMENT TOPICAL 3 TIMES DAILY PRN
Status: DISCONTINUED | OUTPATIENT
Start: 2018-05-27 | End: 2018-05-27 | Stop reason: HOSPADM

## 2018-05-27 RX ORDER — PHENAZOPYRIDINE HYDROCHLORIDE 100 MG/1
100 TABLET, FILM COATED ORAL 3 TIMES DAILY PRN
Qty: 12 TABLET | DISCHARGE
Start: 2018-05-27 | End: 2018-05-30

## 2018-05-27 RX ORDER — CEFUROXIME AXETIL 500 MG/1
500 TABLET ORAL 2 TIMES DAILY
Qty: 14 TABLET | Refills: 0 | DISCHARGE
Start: 2018-05-27 | End: 2018-05-30

## 2018-05-27 RX ORDER — TORSEMIDE 20 MG/1
20 TABLET ORAL DAILY
Status: DISCONTINUED | OUTPATIENT
Start: 2018-05-27 | End: 2018-05-27 | Stop reason: HOSPADM

## 2018-05-27 RX ORDER — OXYCODONE HYDROCHLORIDE 5 MG/1
5 TABLET ORAL EVERY 6 HOURS PRN
Qty: 6 TABLET | Refills: 0 | Status: ON HOLD | OUTPATIENT
Start: 2018-05-27 | End: 2018-07-25

## 2018-05-27 RX ADMIN — GABAPENTIN 600 MG: 600 TABLET, FILM COATED ORAL at 17:04

## 2018-05-27 RX ADMIN — Medication 100 MG: at 09:05

## 2018-05-27 RX ADMIN — GABAPENTIN 600 MG: 600 TABLET, FILM COATED ORAL at 09:05

## 2018-05-27 RX ADMIN — OXYCODONE HYDROCHLORIDE 5 MG: 5 TABLET ORAL at 00:57

## 2018-05-27 RX ADMIN — GABAPENTIN 600 MG: 600 TABLET, FILM COATED ORAL at 11:31

## 2018-05-27 RX ADMIN — OXYCODONE HYDROCHLORIDE 5 MG: 5 TABLET ORAL at 09:05

## 2018-05-27 RX ADMIN — PHENAZOPYRIDINE HYDROCHLORIDE 100 MG: 100 TABLET ORAL at 09:05

## 2018-05-27 RX ADMIN — TORSEMIDE 20 MG: 20 TABLET ORAL at 11:31

## 2018-05-27 RX ADMIN — FOLIC ACID 1 MG: 1 TABLET ORAL at 09:05

## 2018-05-27 RX ADMIN — FLUTICASONE PROPIONATE 2 SPRAY: 50 SPRAY, METERED NASAL at 09:01

## 2018-05-27 RX ADMIN — Medication 1 MG: at 00:42

## 2018-05-27 NOTE — PROGRESS NOTES
SW:  I: SW following for d/c & placement.  D: PC to [facility] admissions to f/u on referral, no male beds available. PC to [facility], no answer SW left voicemail requesting status on referral. PC to [facility] to f/u on referral, admissions rep will call back once reviewed.   P: SW continue to follow for d/c.    Roxie Pinedo, TREMAYNE    ADDENDUM    SW:  D: [facility] requested referral be sent again via paper, SW faxed referral to [phone]. [facility] accepted pt- after 2pm, SW to call with transport/time when made. Pt requests now a private room, ok with sharing a room for a few days until private available. Ok with private room fee. [facility] is looking into private room request & will call back SW when known.   P: SW follow for d/c.

## 2018-05-27 NOTE — PROGRESS NOTES
UROLOGY  FOLLOW UP BILATERAL URETERAL CALCULI,BACTEREMIA, SEPSIS.  HISTORY HTN, ETOH CIRRHOSIS, HX IVC FILTER ANTICARDIOLIPIN ANTIBODY, ACAP S/P BRACHY  HE FEELS BETTER  AVSS  CREAT 1.23, WBC 5.2  GOOD URINE OUTPUT, CLEAR  PLAN:  DC HOME OR TCU PER PRIMARY SERVICE  ANTIBIOTICS PER CULTURE AND SENSITIVITY FOR TOTAL 14 DAYS  REPEAT UC 10 -12 DAYS  WILL ARRANGE BILATERAL URETEROSCOPY AND LASER

## 2018-05-27 NOTE — PROGRESS NOTES
Paged MD: TCU just called, they need the dc orders prior to 3pm due to pharmacy closing. Thanks & sorry!  + Callback received, MD will do the med rec now, and come to do the rest of the dc later.

## 2018-05-27 NOTE — PROGRESS NOTES
[facility]    Hospitalist Progress Note    Assessment & Plan   Antonio Ramirez is a 73 year old male with PMHx of recent traumatic subdural hematoma (4/7/18), alcoholic cirrhosis, CKD, hypertension, CAD, afib and CHF among other medical problems who was admitted on 5/21/2018 with generalized weakness and was subsequently found to have a UTI and klebsiella bacteremia.       Klebsiella bacteremia/sepsis due to UTI  Presented with fever and a lactate of 2.2 but no other SIRS criteria.  Abnormal UA with pyuria and leukocyte esterase.  Received zosyn empirically in ED due to sepsis. Was hypotensive after admission despite fluid challenge. Switched to ceftriaxone on admission. CT abd/pelvis showed bilateral ureteric stones with hydronephrosis. Discussed with urology and IR -- had percutaneous nephrostomy tubes placed 5/22 and ureteric stents placed 5/24. Urine culture from admission and 2/2 blood cultures ultimately grew klebsiella pneumoniae.   -- cont Rocephin while in the hospital, anticipate changing to Ceftin or Augmentin at discharge for total of 2wk course of treatment (curbsided with ID, PO abx ok, has cipro allergy)  -- urology following      Acute kidney injury on CKD stage II due to obstruction, s/p bilateral nephrostomy tube placement on 5/22  Bilateral ureteric stones with hydronephrosis:  Baseline creatinine 0.9 - 1.0. Admission Cr 1.22 on admission, peaked at 1.85 this stay. Suspect component of sepsis and prerenal etiology (FENa <0.1). Given multiple IVF boluses on admission then started on maintenance IVFs.   -- Cr normalized after IVFs this stay, IVFs dc'd 5/25  -- now with nephrostomy tubes as above, mgmt per urology and IR  -- plan per IR is to perform nephrostogram on Tues 5/29 and possibly pull the tubes (will need to turn off the stopcocks 24h prior to appointment to see if he tolerates passage of urine)  -- monitoring urine ouput, encourage po intake  -- pain management with  Tylenol and prn oxycodone -- minimize narcotic use as able  -- will need to follow up with urology after discharge for additional procedures/treatment    Alcoholic cirrhosis  Alcohol abuse with withdrawal:   Continues to drink daily.  Bilirubin mildly elevated on admission (3.2), LFTs otherwise within normal limits. Thought to have some small ascites on exam but abd non-tender and clinical suspicion for SBP was low.  Initially monitored on CIWA protocol, no s/sx of withdrawal noted so this was dc'd.   -- cont MVI, thiamine and folate  -- torsemide resumed 5/27    CAD status post CABG ×3 in 2007  Atrial fibrillation  Hypertension, with hypotensive episodes.  Echo in 1/2018 showed EF 40-45%, regional wall motion abnormalities, moderately dilated RV with decreased RV systolic function, moderate MR, mild AS and pulmonary hypertension.  -- conts on PTA meds including Coreg 3.125mg BID  -- PTA torsemide 20mg daily on hold given need for IVFs this stay -- resumed 5/27    Acute on Chronic anemia and thrombocytopenia:   Suspect due to alcoholic cirrhosis. Baseline hemoglobin approximately 10-11, baseline plt 130-150. Hgb and platelets dropped this stay secondary to sepsis and procedures. Some blood tinged urine noted following procedures but no clots.  -- monitoring counts periodically    Traumatic subdural hematoma in 4/2018:    Healing well, he has been cleared to restart anticoagulation it is awaiting clearance from his outpatient oncologist.      History of DVT PE status post IVC filter, anticardiolipin antibody:    Anticoagulation on hold for now as noted above.    Pain Assessment:  Current Pain Score 5/27/2018   Patient currently in pain? -   Pain score (0-10) 6   Pain location -   Pain descriptors -   CPOT pain score -   - Antonio is experiencing pain due to npehrostomy tubes. Pain management was discussed and the plan was created in a collaborative fashion.  Antonio's response to the current recommendations:  compliant  - Please see the plan for pain management as documented above    FEN: no IVFs, lytes stable, regular diet  DVT Prophylaxis: PCDs  Code Status: Full Code    Disposition: Medically stable for discharge. Anticipate discharge to TCU in next 1-2d, once placement found. SW assisting with discharge planning.     Omaira Coleman    Interval History   Seen this morning. Feeling well. No specific complaints. Denies cp/sob/cough, abd pain/n/v.    -Data reviewed today: I reviewed all new labs and imaging results over the last 24 hours. I personally reviewed no images or EKG's today.    Physical Exam   Temp: 96.9  F (36.1  C) Temp src: Oral BP: 115/61   Heart Rate: 59 Resp: 16 SpO2: 94 % O2 Device: None (Room air)    Vitals:    05/24/18 0500 05/25/18 0229 05/27/18 0612   Weight: 104.5 kg (230 lb 4.8 oz) 105.7 kg (233 lb 1.6 oz) 105.1 kg (231 lb 12 oz)     Vital Signs with Ranges  Temp:  [96.2  F (35.7  C)-97.5  F (36.4  C)] 96.9  F (36.1  C)  Heart Rate:  [59-64] 59  Resp:  [16] 16  BP: (106-120)/(61-68) 115/61  SpO2:  [94 %-97 %] 94 %  I/O last 3 completed shifts:  In: 723 [P.O.:720; I.V.:3]  Out: 1010 [Urine:1010]    Constitutional: Resting comfortably, alert and conversing appropriately, NAD  Respiratory: CTAB, no wheeze/rales/rhonchi, no increased work of breathing  Cardiovascular: HRRR, no MGR, trace RLE edema  GI: S, NT, ND, +BS  Skin/Integumen: warm/dry  Other:  +bilateral nephrostomy tubes draining yellow urine    Medications       carvedilol  3.125 mg Oral BID w/meals     cefTRIAXone  1 g Intravenous Q24H     fluticasone  2 spray Both Nostrils Daily     folic acid  1 mg Oral Daily     gabapentin (NEURONTIN) tablet 600 mg  600 mg Oral TID     sodium chloride (PF)  10 mL Irrigation Q8H     sodium chloride (PF)  10 mL Irrigation Q8H     vitamin  B-1  100 mg Oral Daily       Data     Recent Labs  Lab 05/27/18  0608 05/25/18  0707 05/24/18  0700  05/22/18  0737 05/21/18  1719   WBC 5.2 5.1 5.4  < > 10.6  7.0   HGB 8.9* 8.9* 9.0*  < > 10.0* 11.0*   MCV 83 83 84  < > 84 83   * 76* 68*  < > 80* 119*   INR  --   --  1.68*  --   --   --     139 138  < > 139 138   POTASSIUM 4.7 4.1 4.0  < > 3.9 4.0   CHLORIDE 110* 111* 110*  < > 109 104   CO2 21 19* 18*  < > 22 25   BUN 28 30 32*  < > 23 19   CR 1.23 1.40* 1.57*  < > 1.69* 1.22   ANIONGAP 6 9 10  < > 8 9   BENNIE 8.6 7.8* 7.4*  < > 8.1* 9.0   GLC 94 80 81  < > 106* 118*   ALBUMIN  --   --   --   --  2.8* 3.2*   PROTTOTAL  --   --   --   --  6.6* 7.5   BILITOTAL  --   --   --   --  1.5* 1.7*   ALKPHOS  --   --   --   --  89 105   ALT  --   --   --   --  16 12   AST  --   --   --   --  41 32   TROPI  --   --   --   --   --  0.027   < > = values in this interval not displayed.    No results found for this or any previous visit (from the past 24 hour(s)).

## 2018-05-27 NOTE — DISCHARGE SUMMARY
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  Acute on Chronic anemia and thrombocytopenia:   Traumatic subdural hematoma in 4/2018:    History of DVT PE status post IVC filter, anticardiolipin antibody:      History of Present Illness   Antonio Ramirez is a 73 year old male with PMHx of recent traumatic subdural hematoma (4/7/18), alcoholic cirrhosis, CKD, hypertension, CAD, afib and CHF among other medical problems who was admitted on 5/21/2018 with generalized weakness and was subsequently found to have a UTI and klebsiella bacteremia.     Hospital Course   Antonio Ramirez was admitted on 5/21/2018.  The following problems were addressed during his hospitalization:      Klebsiella bacteremia/sepsis due to UTI  Presented with fever and a lactate of 2.2 but no other SIRS criteria.  Abnormal UA with pyuria and leukocyte esterase.  Received zosyn empirically in ED due to sepsis. Was hypotensive after admission despite fluid challenge. Switched to ceftriaxone on admission. CT abd/pelvis showed bilateral ureteral stones with hydronephrosis. Discussed with urology and IR -- had percutaneous nephrostomy tubes placed per IR on 5/22 and bilateral ureteral stents placed 5/24 also per IR. Urine culture from admission and 2/2 blood cultures ultimately grew klebsiella pneumoniae. Responded well to treatment with Rocephin during hospital stay. Changed to Ceftin at discharge with plans to complete total of 2wks  of treatment (plan was discussed with ID, though they did not formally consult this stay).       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 Admission Cr 1.22 on admission, peaked at 1.85 this stay. Suspect component of sepsis and prerenal etiology (FENa <0.1). Given multiple IVF boluses on admission then started on maintenance IVFs. As above, given findings of ureteral stones with hydronephrosis, patient underwent placement of bilateral percutaneous nephrostomy tubes on 5/22/18 per IR. Had ureteral stents placed on 5/24 with nephrostomy tube exchange. Renal function improved during his stay. Discharged with bilateral nephrostomy tubes in place. Plan per IR is to perform nephrostogram on Tues 5/29 and possibly pull the tubes (will need to turn off the stopcocks 24h prior to appointment to see if he tolerates passage of urine). Pain managed with prn oxycodone this stay. Will plan to follow up with urology in the coming weeks to discuss plans for additional procedures/treatment.     Alcoholic cirrhosis  Alcohol abuse with withdrawal  Continues to drink daily.  Bilirubin mildly elevated on admission (3.2), LFTs otherwise within normal limits. Thought to have some small ascites on exam but abd non-tender and clinical suspicion for SBP was low. Initially monitored on CIWA protocol, no s/sx of withdrawal noted so this was dc'd. Remained on multivitamin, thiamine and folate. Torsemide was held this stay, resumed the day of discharge.     CAD status post CABG ×3 in 2007  Atrial fibrillation  Hypertension, with hypotensive episodes  Echo in 1/2018 showed EF 40-45%, regional wall motion abnormalities, moderately dilated RV with decreased RV systolic function, moderate MR, mild AS and pulmonary hypertension. Remained stable on Coreg. Torsemide held this stay  given need for IVFs and ANSELMO. Resumed the day of discharge.      Acute on Chronic anemia and thrombocytopenia  Suspect due to alcoholic cirrhosis. Baseline hemoglobin approximately 10-11, baseline plt 130-150. Hgb and platelets dropped this stay secondary to sepsis and procedures. Some blood tinged urine noted following procedures but no clots. Hgb remained stable.     Traumatic subdural hematoma in 4/2018  Healing well, he has been cleared to restart anticoagulation it is awaiting clearance from his outpatient oncologist.      History of DVT PE status post IVC filter, anticardiolipin antibody  Anticoagulation on hold for now as noted above.    Discharge Pain Plan:  - During his hospitalization, Antonio experienced pain due to placement of nephrostomy tubes.  The pain plan for discharge was discussed with Antonio and the plan was created in a collaborative fashion.    - Opioids prescribed on discharge: oxycodone 5mg q6h prn  - Duration of opioids after discharge: 6 tabs prescribed to TCU  - Bowel regimen: not needed    Omaira Coleman    Significant Results and Procedures   1. Placement of bilateral percutaneous nephrostomy tubes per Dr. Pittman (interventional radiology)  Pre-procedure diagnosis: bilateral hydronephrosis and ureteral stones.   Post-procedure diagnosis: Same     Procedure Date/Time: May 22, 2018  8:34 PM  Procedure: bilateral nephrostomy tube placement.   Estimated blood loss: None  Specimen(s) collected with description: none    2. Placement of bilateral ureteral stents and replacement of percutaneous nephrostomy tubes per Dr. Dye (interventional radiology)  Pre-procedure diagnosis:  Ureterolithiasis  Post-procedure diagnosis: Same     Procedure Date/Time: May 24, 2018  9:59 AM  Procedure: Bilateral nephrostograms, placement of 8 Fr X 26 cm ureteral stents, and replacement of 8 Fr PCNs.  Keep to gravity drainage.  Given urosepsis presentation, and oozing from left PCN site (in context of  INR 1.7), will plan for repeat nephrostogram and tube pull next week, when convenient for patient.  Tomorrow is too soon.  Estimated blood loss: None  Specimen(s) collected with description: Please see above.    Code Status   Full Code       Primary Care Physician   Main Hardy    Physical Exam   Temp: 96.9  F (36.1  C) Temp src: Oral BP: 115/61   Heart Rate: 59 Resp: 16 SpO2: 94 % O2 Device: None (Room air)    Vitals:    05/24/18 0500 05/25/18 0229 05/27/18 0612   Weight: 104.5 kg (230 lb 4.8 oz) 105.7 kg (233 lb 1.6 oz) 105.1 kg (231 lb 12 oz)     Vital Signs with Ranges  Temp:  [96.9  F (36.1  C)-97.5  F (36.4  C)] 96.9  F (36.1  C)  Heart Rate:  [59-61] 59  Resp:  [16] 16  BP: (115-120)/(61-65) 115/61  SpO2:  [94 %] 94 %  I/O last 3 completed shifts:  In: 123 [P.O.:120; I.V.:3]  Out: 1400 [Urine:1400]    General: Resting comfortably, alert, conversive, NAD  CVS: HRRR, no MGR, trace RLE edema and +L BKA  Respiratory: CTAB, no wheeze/rales/rhonchi, no increased work of breathing  GI: S, NT, ND, +BS  Skin: Warm/dry  Other: +bilateral nephrostomy tubes draining clear yellow urine    Discharge Disposition   Discharged to short-term care facility  Condition at discharge: Stable    Consultations This Hospital Stay   UROLOGY IP CONSULT  INTERVENTIONAL RADIOLOGY IP CONSULT    Time Spent on this Encounter   IOmaira, personally saw the patient today and spent greater than 30 minutes discharging this patient.    Discharge Orders     General info for SNF   Length of Stay Estimate: Short Term Care: Estimated # of Days <30  Condition at Discharge: Improving  Level of care:skilled   Rehabilitation Potential: Excellent  Admission H&P remains valid and up-to-date: Yes  Recent Chemotherapy: N/A  Use Nursing Home Standing Orders: N/A     Mantoux instructions   Give two-step Mantoux (PPD) Per Facility Policy Yes     Reason for your hospital stay   Management of your urinary obstruction,which required placement  of bilateral nephrostomy tubes to help drain your urine. Because of this obstruction, you developed and infection in your urine and blood stream which required treatment with IV antibiotics.     Additional Discharge Instructions   Management of percutaneous nephrostomy tubes as directed per radiology. Stopcock will need to be turned to off position on Monday at 0900 to see if he passes urine.     Follow Up and recommended labs and tests   1. Follow up with Interventional Radiology on Tuesday 5/29 for nephrostogram to see if your tubes can be removed.   2. Follow up with your urologist as scheduled.  3. Follow up with facility physician in the next 2-3 days.     Activity - Up with nursing assistance     Full Code     Physical Therapy Adult Consult   Evaluate and treat as clinically indicated.    Reason:  Physical deconditioning     Occupational Therapy Adult Consult   Evaluate and treat as clinically indicated.    Reason:  Physical deconditioning     Advance Diet as Tolerated   Follow this diet upon discharge: 2g salt       Discharge Medications   Current Discharge Medication List      START taking these medications    Details   cefuroxime (CEFTIN) 500 MG tablet Take 1 tablet (500 mg) by mouth 2 times daily for 11 days  Qty: 14 tablet, Refills: 0    Associated Diagnoses: Urinary tract infection without hematuria, site unspecified; Bacteremia due to Enterococcus      folic acid (FOLVITE) 1 MG tablet Take 1 tablet (1 mg) by mouth daily  Qty: 30 tablet    Associated Diagnoses: Alcohol dependence in remission (H)      oxyCODONE IR (ROXICODONE) 5 MG tablet Take 1 tablet (5 mg) by mouth every 6 hours as needed for other (pain control or improvement in physical function. Hold dose for analgesic side effects.)  Qty: 6 tablet, Refills: 0    Associated Diagnoses: Urinary obstruction      phenazopyridine (PYRIDIUM) 100 MG tablet Take 1 tablet (100 mg) by mouth 3 times daily as needed for urinary tract discomfort  Qty: 12 tablet  "   Associated Diagnoses: Urinary tract infection without hematuria, site unspecified      thiamine 100 MG tablet Take 1 tablet (100 mg) by mouth daily    Associated Diagnoses: Alcohol dependence in remission (H)         CONTINUE these medications which have NOT CHANGED    Details   ASPIRIN NOT PRESCRIBED (INTENTIONAL) Please choose reason not prescribed, below  Qty: 0 each, Refills: 0    Associated Diagnoses: Antiphospholipid antibody syndrome (H)      carvedilol (COREG) 3.125 MG tablet Take 1 tablet (3.125 mg) by mouth 2 times daily (with meals)  Qty: 180 tablet, Refills: 1      fluticasone (FLONASE) 50 MCG/ACT spray Spray 2 sprays into both nostrils daily      GABAPENTIN PO Take 600 mg by mouth 3 times daily       torsemide (DEMADEX) 20 MG tablet Take 20 mg by mouth daily         STOP taking these medications       HYDROMORPHONE HCL PO Comments:   Reason for Stopping:             Allergies   Allergies   Allergen Reactions     Hmg-Coa-R Inhibitors Other (See Comments)     Rhabdomyolysis occurred within a couple days     Ciprofloxacin Itching     Severe itching \"like ants were crawling\"     Data   Most Recent 3 CBC's:  Recent Labs   Lab Test  05/27/18   0608  05/25/18   0707  05/24/18   0700   WBC  5.2  5.1  5.4   HGB  8.9*  8.9*  9.0*   MCV  83  83  84   PLT  106*  76*  68*      Most Recent 3 BMP's:  Recent Labs   Lab Test  05/27/18   0608  05/25/18   0707  05/24/18   0700   NA  137  139  138   POTASSIUM  4.7  4.1  4.0   CHLORIDE  110*  111*  110*   CO2  21  19*  18*   BUN  28  30  32*   CR  1.23  1.40*  1.57*   ANIONGAP  6  9  10   BENNIE  8.6  7.8*  7.4*   GLC  94  80  81     Most Recent 2 LFT's:  Recent Labs   Lab Test  05/22/18   0737  05/21/18   1719   AST  41  32   ALT  16  12   ALKPHOS  89  105   BILITOTAL  1.5*  1.7*       Most Recent 6 Bacteria Isolates From Any Culture (See EPIC Reports for Culture Details):  Recent Labs   Lab Test  05/21/18   1745  05/21/18   1719  04/10/18   2230  04/10/18   2220  " 04/07/18   1754  04/07/18   1310   CULT  >100,000 colonies/mL  Klebsiella pneumoniae  *  >100,000 colonies/mL  Strain 2  Klebsiella pneumoniae  *  Cultured on the 1st day of incubation:  Klebsiella pneumoniae  *  Critical Value/Significant Value, preliminary result only, called to and read back by  Summer Ann RN, @1707 05/22/18.DH.    Susceptibility testing done on previous specimen  Cultured on the 1st day of incubation:  Klebsiella pneumoniae  *  Critical Value/Significant Value, preliminary result only, called to and read back by  Angus Lo RN SH88 @ 1021 5.22.18 JE    (Note)  NEGATIVE for the following organisms and resistance markers:  Acinetobacter sp., Citrobacter sp., Enterobacter sp., Proteus sp., E.  coli, K. pneumoniae/oxytoca, P. aeruginosa, CTX-M, KPC, NDM, VIM, IMP  and OXA by Verigene multiplex nucleic acid test. Final identification  and antimicrobial susceptibility testing will be verified by standard  methods.    Critical Value/Significant Value called to and read back by Angus Lo RN SH88 @ 1241 05.22.18 JE    No growth  No growth  50,000 to 100,000 colonies/mL  Klebsiella pneumoniae  *  Cultured on the 3rd day of incubation:  Staphylococcus capitis  *  Critical Value/Significant Value, preliminary result only, called to and read back by  Farida Escobedo RN/SH73 at 2128 on 4/10/18. EH.    (Note)  POSITIVE for Staphylococci other than S.aureus, S.epidermidis and  S.lugdunensis, by Verigene multiplex nucleic acid test.  Coagulase-negative staphylococci are the most common venipuncture or  collection associated skin CONTAMINANTS grown in blood cultures.  Final identification and antimicrobial susceptibility testing will be  verified by standard methods.    Specimen tested with Verigene multiplex, gram-positive blood culture  nucleic acid test for the following targets: Staph aureus, Staph  epidermidis, Staph lugdunensis, other Staph species, Enterococcus  faecalis, Enterococcus  faecium, Streptococcus species, S. agalactiae,  S. anginosus grp., S. pneumoniae, S. pyogenes, Listeria sp., mecA  (methicillin resistance) and Rad/B (vancomycin resistance).    Critical Value/Significant Value called to and read back by TAMAR WALLACE RN @0005 4/11/18. SCG         Most Recent TSH, T4 and A1c Labs:  Recent Labs   Lab Test  05/18/18   1641   A1C  4.6     Results for orders placed or performed during the hospital encounter of 05/21/18   Chest XR,  PA & LAT    Narrative    CHEST TWO VIEWS   5/21/2018 6:05 PM     HISTORY: Fever.    COMPARISON: 4/7/2018.    FINDINGS: A small hazy opacity in the posterior aspect of the lower  right lung, best visualized on the lateral projection image. Blunting  of the right costophrenic angle. The lungs are otherwise clear.  Borderline cardiomegaly. Atherosclerotic calcification in the thoracic  aorta. Prior median sternotomy.      Impression    IMPRESSION:   1. A small hazy opacity in the posterior aspect of the lower right  lung is nonspecific, but pneumonia is possible. A follow-up chest  radiograph would be useful in one month to confirm resolution of this  finding.  2. Blunting of the right costophrenic angle, suggestive of a small  right pleural effusion.    RONALD ALMEIDA MD   CT Abdomen Pelvis w/o Contrast    Narrative    CT ABDOMEN AND PELVIS WITHOUT CONTRAST 5/22/2018 4:02 PM     HISTORY: UTI, sepsis. Worsened Creatinine.      COMPARISON: None.    TECHNIQUE: Axial CT images of the abdomen and pelvis from the  diaphragm to the symphysis pubis were acquired without IV contrast.  Radiation dose for this scan was reduced using automated exposure  control, adjustment of the mA and/or kV according to patient size, or  iterative reconstruction technique.    FINDINGS: There is a stone which measures 1.1 cm in maximal axial  dimension and 1.7 cm in length in the proximal left ureter. Just above  this is a 7 mm stone on the left. There is mild left hydronephrosis.  In  the distal right ureter there are two stones adjacent to one  another measuring 8 mm in maximal dimension. There is mild-moderate  hydronephrosis proximal to this on the right. There are approximately  eight nonobstructing stones in the left kidney measuring up to 8 mm.  No intrarenal stones are seen on the right. There is mild bilateral  perinephric fat stranding. Kidneys are normal in size and  configuration.  There is moderate diverticulosis without evidence for  diverticulitis. There are no dilated loops of small intestine or large  bowel to suggest ileus or obstruction. Minimal free fluid. No free  air. Somewhat lobular contour to the liver noted which is nonspecific.  Cholecystectomy changes. There are extensive atherosclerotic changes  of the visualized aorta and its branches. There is no evidence of  aortic aneurysm.  No splenomegaly.  No definite adrenal nodules.   Pancreas grossly unremarkable. The remainder of the visualized abdomen  is unremarkable on this noncontrast scan. Survey of the visualized  bony structures demonstrates no destructive bony lesions. Small right  pleural effusion with associated compressive atelectasis and/or  infiltrate. Minimal probable atelectasis associated with calcified  pleural plaque at the left base. There are extensive coronary vascular  calcifications and/or stents consistent with coronary artery disease.      Impression    IMPRESSION:   1. Stones measuring 1.1 and 0.7 cm in maximal axial dimension are seen  in the proximal left ureter. There is mild proximal hydronephrosis.  2. Two stones measuring 8 mm in maximal axial dimension are seen in  the distal right ureter with mild-moderate proximal hydronephrosis.    CARMEN SERRATO MD   IR Nephrostomy Tube Placement Bilateral    Narrative    PROCEDURE(S):  1. Left antegrade nephrostogram  2. Placement of 8French left percutaneous nephrostomy tube   3. Right antegrade nephrostogram  4. Placement of 8French right percutaneous  "nephrostomy tube    DATE OF PROCEDURE: 5/22/2018    MEDICATIONS: 15 mL 1% lidocaine SQ, Versed 3 mg IV, Fentanyl 150 mcg  IV, patient was receiving antibiotics on the floor.    CONTRAST: 30 mL Isovue 300 into the renal collecting system.    FLUOROSCOPY TIME: 5.9 minutes, (19 mGy).    COMPLICATIONS: None.    CLINICAL HISTORY/INDICATION: 73-year-old male with urosepsis and  obstructing ureteral stones.    PROCEDURES AND FINDINGS:  Following a discussion of the risks,  benefits, indications and alternatives to treatment, appropriate  informed consent was obtained.  The patient was brought to the  interventional radiology suite and placed on the table. The bilateral  flank were prepped and draped in usual sterile fashion. A time out was  performed per universal protocol policy to ensure correct patient,  site and procedure to be performed.     A preliminary ultrasound of the left flank was performed which  demonstrates no hydronephrosis. Echogenic shadowing stones are noted.   Ultrasound images were archived.  Then, under ultrasound guidance an  appropriate site was marked in the left lateral lower back region for  needle placement and this region was subsequently infiltrated with 1%  lidocaine for local anesthesia. Under direct ultrasound guidance, a  21-gauge needle was inserted into the upper pole of the calyx until  urine return was noted. A small amount of contrast was then injected,  which demonstrates no hydronephrosis or hydroureter. There is a  filling defect in the proximal ureter consistent with known stone.  Contrast flows easily down the ureter into the bladder.  A 0.018\" wire  was then advanced through the needle and coiled within the renal  pelvis. The needle was then exchanged for a coaxial Guillermo set, which  was advanced over the wire through the calyx into the renal pelvis.  The 0.018\" wire was placed to the side as a safety wire and a 0.035\"  guidewire was advanced into the renal pelvis. The tract was " "serially  dilated with final placement of a 8 Samoan nephrostomy tube with the  tip coiled in the renal pelvis. Injection of contrast demonstrates  pigtail is centered within the renal pelvis.  The contrast was  aspirated and the tube was placed to gravity bag drainage. The  catheter was secured to the skin using a 2-0 Ethilon and a sterile  dressing was applied.      Attention was then turned to the right kidney. Preliminary ultrasound  of the right flank demonstrates mild hydronephrosis. An appropriate  site was marked in the right lower back region for needle placement  and this region was then infiltrated with 1% lidocaine. Under direct  ultrasound guidance, a 21-gauge needle was inserted into the lower  pole of the dilated calyx until urine return was noted.  A small  amount of contrast was then injected which demonstrates mild  hydronephrosis and hydroureter. Filling defect is seen in the distal  ureter consistent with known stone. Contrast flows easily down the  ureter into the bladder.  A 0.018\" wire was then advanced through the  needle and coiled within the renal pelvis. The needle was then  exchanged for a coaxial Guillermo set, which was advanced over the wire  through the calyx into the renal pelvis. The 0.018 wire was placed to  the side as a safety wire and a 0.035\" guidewire was advanced into the  renal pelvis. The tract was serially dilated with final placement of  an 8 Samoan nephrostomy tube with the tip coiled in the renal pelvis.  Injection of contrast demonstrates the pigtail centered within the  renal pelvis.  The contrast was aspirated and the tube was placed to  gravity bag drainage. The catheter was secured to the skin using a 2-0  Ethilon and a sterile dressing was applied.      Throughout the procedure, the patient was monitored by a radiology  nurse for cardiac rhythm and oxygen saturation which remained stable.  Total sedation time was 52 minutes The patient tolerated the procedure  well and " left interventional radiology in stable condition.      Impression    IMPRESSION:  1. Successful placement of a 8 Burmese left percutaneous nephrostomy  tube. Left antegrade nephrostogram demonstrates no hydronephrosis or  hydroureter. The left collecting system is patent. Contrast flows  easily down the ureter into the bladder without evidence of  obstruction. Filling defect is seen in the proximal left ureter,  consistent with known stone.  2. Successful placement of a right Burmese right percutaneous  nephrostomy tube. Right antegrade nephrostogram demonstrates mild to  moderate hydronephrosis and hydroureter. The right collecting system  is patent. Contrast flows easily down the ureter into the bladder  without evidence of obstruction. Filling defect is seen in the distal  right ureter, consistent with known stone.    PRETTY JO DO   IR Ureteral Stent Placement Bilateral    Narrative    INTERVENTIONAL RADIOLOGY URETERAL STENT PLACEMENT BILATERAL 5/24/2018  9:34 AM    HISTORY: 73-year-old patient presenting with urosepsis on May 22,  2018. Patient had bilateral percutaneous nephrostomy tubes placed at  that time. Patient is relatively large, though not completely  obstructing calcifications in each ureter. Plan is for placement of  ureteral stent in exchange of nephrostomy tubes.    TECHNIQUE: Patient was brought to the interventional radiology  department and informed consent obtained. Patient was placed in a  prone position. Each percutaneous nephrostomy tube and surrounding  skin were prepped and draped in standard sterile fashion. 1% lidocaine  was administered around each tube. Spot fluoroscopic images were  obtained before and after contrast injection through each tube. Each  tube was cut and a stiff angled Glidewire was advanced through the  tube and into the bladder. 8 Burmese x 26 cm ureteral stents were then  advanced over the Glidewire into the bladder. Glidewire was maintained  in the renal  collecting systems, over which new bilateral 8 Kenyan  percutaneous nephrostomy tubes were deployed into each renal pelvis.  Contrast was again injected to confirm appropriate position. Each  percutaneous nephrostomy tube was secured in position with Prolene  suture. All 4 tubes were confirmed to be in good position by  completion.    Sedation: 1.5 mg IV Versed and 50 mcg IV fentanyl.  Sedation time: 45 minutes.    Please note the above medications were administered by the  interventional radiology staff under my direct supervision. Patient's  vital signs were monitored and remained stable throughout the  procedure.    Fluoroscopic time: 5.9 minutes  Total fluoroscopic dose: 42 mGy  Contrast: 40 mL of Isovue administered into each collecting system  without complication.  Local anesthetic: 20 mL of 1% lidocaine.    FINDINGS: Total of 9 spot fluoroscopic images were obtained throughout  the procedure. Initial nephrostograms demonstrate nondilated renal  collecting systems and patent ureters. At completion, bilateral  ureteral stents in good position as well as exchanged 8 Kenyan  percutaneous nephrostomy tubes.      Impression    IMPRESSION:  1. Initial bilateral nephrostograms demonstrate patent ureters without  hydronephrosis.  2. Successful placement of bilateral 8 Kenyan x 26 cm ureteral stents.  3. Successful exchange of bilateral 8 Kenyan percutaneous nephrostomy  tubes.    The nephrostomy tubes are to be kept to gravity drainage with periodic  flushing. The tubes are to remain in place for several days given  patient's recent history of urosepsis and inconsistent drainage  through the nephrostomy tubes. Patient will return early next week for  repeat nephrostograms and hopeful nephrostomy tube removal.    NICK LEBLANC MD

## 2018-05-27 NOTE — PROGRESS NOTES
"PAS-RR    D: Per DHS regulation, FAHEEM completed and submitted PAS-RR to [place] [employer] Direct Connect via the Senior LinkAge Line.  PAS-RR confirmation # is : [id number]    I: FAHEEM spoke with pt and they are aware a PAS-RR has been submitted.  FAHEEM reviewed with pt that they may be contacted for a follow up appointment within 10 days of hospital discharge if their SNF stay is < 30 days.  Contact information for Senior LinkAge Line was also provided.    A: pt  verbalized understanding.    P: Further questions may be directed to [facility] LinkAge Line at #[phone], option #4 for PAS-RR staff.    SW:  D: FAHEEM verified a private room with [facility]. SW discussed private room fee $36/ day, pt was ok with this. FAHEEM discussed transportation. Pt would like a wheelchair ride setup, SW went over fee associated with wheelchair ride base was around $65 & every mile was about $6/ mile. Pt was ok with this. FAHEEM called [facility] & setup transport for 1715 by wheelchair. FAHEEM called [facility] & updated on time & transport. FAHEEM completed PAS. SW to fax orders when completed to [phone].  P: SW continue to follow.     ADDENDUM    SW:  D: FAHEEM paged MD \"Please complete discharge orders to TCU. Bed confirmed pt will d/c @ 1715 via [facility] wheelchair. thank you!\" once orders compelted will fax to [facility].  P: FAHEEM will continue to follow.   TREMAYNE Ghosh    ADDENDUM    SW:  D: FAHEEM faxed orders to [facility] [phone].  P: Follow for d/c.     "

## 2018-05-27 NOTE — PROGRESS NOTES
Care Coordination:    Sent handoff to PCP clinic due to elevated readmission risk score.    Yoly Mohr RN, BSN  [facility] Care Coordinator   Mobile Phone: [phone]    _________________________________________________________________  Transition Communication Hand-off for Care Transitions to Next Level of Care Provider    Name: Antonio Ramirez  : 1945  MRN #: [id number]  Primary Care Provider: [facility]    Primary Clinic: [facility]  [facility]    Reason for Hospitalization:  Hyperbilirubinemia [E80.6]  Thrombocytopenia (H) [D69.6]  Generalized weakness [R53.1]  Severe sepsis (H) [A41.9, R65.20]  Urinary tract infection without hematuria, site unspecified [N39.0]  Acute renal failure, unspecified acute renal failure type (H) [N17.9]  Admit Date/Time: 2018  5:09 PM   Discharge Date: 18  Payor Source: Payor: MEDICARE / Plan: MEDICARE / Product Type: Medicare /   Readmission Assessment Measure (MANSOOR) Risk Score/category:  ELEVATED  Reason for Communication Hand-off Referral: elevated readmission risk    Plan of Care Goals/Milestone Events:  Hx of recent traumatic subdural hematoma (18), alcoholic cirrhosis, CKD, hypertension, CAD, afib and CHF.  Admitted on 2018 with generalized weakness,   1. found UTI and klebsiella bacteremia. D/c'd to [facility] with 2 week course IV abx.    2. 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  Neph tubes to be pulled .      Discharge Plan:  Discharge Plan:       Most Recent Value    Concerns To Be Addressed all concerns addressed in this encounter       Concern for non-adherence with plan of care: No so long as has support of TCU  Discharge Needs Assessment:  Needs       Most Recent Value    Anticipated Changes Related to Illness inability to care for self    Current Discharge Risk physical impairment    # of Referrals Placed by CTS Post Acute Facilities    PAS Number  [id number]        Follow-up specialty is recommended: Yes urology    Follow-up plan:  Future Appointments  Date Time Provider Department Center   5/29/2018 8:00 AM [facility]   5/29/2018 1:30 PM Silvia Goel PT [facility]     Any outstanding tests or procedures:      Referrals     Future Labs/Procedures    Occupational Therapy Adult Consult     Comments:    Evaluate and treat as clinically indicated.    Reason:  Physical deconditioning    Physical Therapy Adult Consult     Comments:    Evaluate and treat as clinically indicated.    Reason:  Physical deconditioning          Key Recommendations:  Will need PCP followup following TCU stay, as well as urology given the stents.  Please continue to closely follow this patient in clinic once out of the TC U! Thank you,     Yoly Mohr RN, BSN  [facility] Care Coordinator   Mobile Phone: [phone]    AVS/Discharge Summary is the source of truth; this is a helpful guide for improved communication of patient story

## 2018-05-29 ENCOUNTER — APPOINTMENT (OUTPATIENT)
Dept: INTERVENTIONAL RADIOLOGY/VASCULAR | Facility: CLINIC | Age: 73
End: 2018-05-29
Attending: PHYSICIAN ASSISTANT
Payer: MEDICARE

## 2018-05-29 ENCOUNTER — HOSPITAL ENCOUNTER (OUTPATIENT)
Facility: CLINIC | Age: 73
Discharge: HOME | End: 2018-05-29
Attending: RADIOLOGY | Admitting: RADIOLOGY
Payer: MEDICARE

## 2018-05-29 ENCOUNTER — PATIENT OUTREACH (OUTPATIENT)
Dept: FAMILY MEDICINE | Facility: CLINIC | Age: 73
End: 2018-05-29

## 2018-05-29 VITALS
HEART RATE: 58 BPM | SYSTOLIC BLOOD PRESSURE: 126 MMHG | TEMPERATURE: 95.6 F | WEIGHT: 215 LBS | DIASTOLIC BLOOD PRESSURE: 62 MMHG | BODY MASS INDEX: 27.59 KG/M2 | HEIGHT: 74 IN | OXYGEN SATURATION: 100 % | RESPIRATION RATE: 16 BRPM

## 2018-05-29 PROCEDURE — 40000854 ZZH STATISTIC SIMPLE TUBE INSERTION/CHARGE, PORT, CATH, FISTULOGRAM

## 2018-05-29 PROCEDURE — 50435 EXCHANGE NEPHROSTOMY CATH: CPT | Mod: 50

## 2018-05-29 NOTE — PROCEDURES
RADIOLOGY PROCEDURE NOTE  Patient name: Antonio Ramirez  MRN: [id number]  : 1945    Pre-procedure diagnosis: bilateral ureteral stones.   Post-procedure diagnosis: Same    Procedure Date/Time: May 29, 2018  8:21 AM  Procedure: bilateral nephrostogram and removal of bilateral nephrostomy tubes. Patient has bilateral ureteral stents in place.   Estimated blood loss: None  Specimen(s) collected with description: none  The patient tolerated the procedure well with no immediate complications.  Significant findings:none    See imaging dictation for procedural details.    Provider name: Mary Pittman  Assistant(s):None

## 2018-05-29 NOTE — PROGRESS NOTES
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   Transition to Facility:              Facility Name: [facility]              Contact name and phone number/fax: LUKE James  ([phone])  Writer called Ms. Pathak and left a message with call back information requesting that writer be kept advised of patient's disposition.    Per chart review, it appears that patient had an Interventional Radiology procedure (\"bilateral nephrostogram and removal of bilateral nephrostomy tubes\") scheduled today.    Plan: SW Care Coordinator will await notification from facility staff informing SW Care Coordinator of patient's discharge plans/needs. SW Care Coordinator will review chart and outreach to facility staff every 4 weeks and as needed.     TREMAYNE Torrez  [facility] Care Coordination  Clinic: [facility]   Email: [email or web address]  Tele: [phone]            "

## 2018-05-29 NOTE — PROGRESS NOTES
Return to CS 8.  Bilateral dressings CDI.  Reports no pain.  Juice given.  Additional dressing given to pt for home.  D/c via WC with wife.

## 2018-05-29 NOTE — IR NOTE
Patient arrived to IR 3 for scheduled neph tube check and possible removal. MD at bedside consenting for procedure. Patient transferred to table into prone position and prepped for procedure.

## 2018-05-29 NOTE — PROGRESS NOTES
0748 Pt prepped for neph tube removal. Med assess incomplete as pt unable to recall medications that he took. States no blood thinners.

## 2018-05-30 ENCOUNTER — OFFICE VISIT (OUTPATIENT)
Dept: FAMILY MEDICINE | Facility: CLINIC | Age: 73
End: 2018-05-30
Payer: MEDICARE

## 2018-05-30 VITALS
TEMPERATURE: 97.7 F | WEIGHT: 226.2 LBS | DIASTOLIC BLOOD PRESSURE: 56 MMHG | HEART RATE: 67 BPM | HEIGHT: 74 IN | SYSTOLIC BLOOD PRESSURE: 126 MMHG | BODY MASS INDEX: 29.03 KG/M2 | OXYGEN SATURATION: 99 %

## 2018-05-30 DIAGNOSIS — B95.2 BACTEREMIA DUE TO ENTEROCOCCUS: ICD-10-CM

## 2018-05-30 DIAGNOSIS — N20.0 NEPHROLITHIASIS: ICD-10-CM

## 2018-05-30 DIAGNOSIS — R78.81 BACTEREMIA DUE TO ENTEROCOCCUS: ICD-10-CM

## 2018-05-30 DIAGNOSIS — N39.0 URINARY TRACT INFECTION WITHOUT HEMATURIA, SITE UNSPECIFIED: Primary | ICD-10-CM

## 2018-05-30 DIAGNOSIS — K70.30 ALCOHOLIC CIRRHOSIS OF LIVER WITHOUT ASCITES (H): ICD-10-CM

## 2018-05-30 DIAGNOSIS — B37.2 CANDIDAL INTERTRIGO: ICD-10-CM

## 2018-05-30 PROCEDURE — 99495 TRANSJ CARE MGMT MOD F2F 14D: CPT | Performed by: INTERNAL MEDICINE

## 2018-05-30 RX ORDER — PHENAZOPYRIDINE HYDROCHLORIDE 100 MG/1
100 TABLET, FILM COATED ORAL 3 TIMES DAILY PRN
Qty: 90 TABLET | Refills: 3 | Status: ON HOLD | OUTPATIENT
Start: 2018-05-30 | End: 2018-10-30

## 2018-05-30 RX ORDER — NYSTATIN 100000 U/G
CREAM TOPICAL 3 TIMES DAILY
Qty: 30 G | Refills: 11 | Status: SHIPPED | OUTPATIENT
Start: 2018-05-30 | End: 2018-06-13

## 2018-05-30 RX ORDER — CEFUROXIME AXETIL 500 MG/1
500 TABLET ORAL 2 TIMES DAILY
Qty: 14 TABLET | Refills: 0 | Status: SHIPPED | OUTPATIENT
Start: 2018-05-30 | End: 2018-06-27

## 2018-05-30 NOTE — PATIENT INSTRUCTIONS
Apply nystatin cream to red areas in groin followed by a barrier cream such as Desitin.  Do this three times daily until the skin heals.

## 2018-05-30 NOTE — PROGRESS NOTES
SUBJECTIVE:   Antonio Ramirez is a 73 year old male who presents to clinic today for the following health issues:          Hospital Follow-up Visit:    Hospital/Nursing Home/IP Rehab Facility: [facility]  Date of Admission: 05/21/2018  Date of Discharge: 05/27/2018  Reason(s) for Admission: UTI             Problems taking medications regularly:  None       Medication changes since discharge: Ceftin (11d), Pyridium, oxycodone IR, thiamine, folic acid, Xarelto 10 mg       Problems adhering to non-medication therapy:  None    Summary of hospitalization:  [facility] discharge summary reviewed  Diagnostic Tests/Treatments reviewed.  Follow up needed: Urology, physical therapy, nursing care for ongoing skin breakdown  Other Healthcare Providers Involved in Patient s Care:         None  Update since discharge: improved.     Post Discharge Medication Reconciliation: discharge medications reconciled and changed, per note/orders (see AVS).  Plan of care communicated with patient and family (wife)     Coding guidelines for this visit:  Type of Medical   Decision Making Face-to-Face Visit       within 7 Days of discharge Face-to-Face Visit        within 14 days of discharge   Moderate Complexity [id number]   High Complexity [id number]            Patient hospitalized with severe sepsis, Klebsiella bacteremia from urinary tract source.  He was noted to have obstructing kidney stones.  He received bilateral nephrostomy tubes in addition to ureteral stents.  He requires kidney stone removal.  He was discharged to a transitional care unit.  He left the transitional care unit for interventional radiology to remove his nephrostomy tubes.  He failed to report back to the transitional care unit.  The patient and his wife state that they were confused and that they thought that he was allowed to go home from interventional radiology rather than return to the transitional care unit.  They did not realize that  he was to return to the transitional care unit.  Since returning home, the patient's wife feels slightly overwhelmed with taking care of the patient's surgical wounds and notes skin breakdown in his perineal area.  The patient complains of swelling in his bilateral thighs and in his scrotum since hospital discharge.  He denies current fevers or chills.  He was noted to have alcohol withdrawal symptoms during his hospitalization by my review of the discharge summary.  The patient disputes this and claims that the signs of alcohol withdrawal were the result of his severe sepsis rather than alcohol withdrawal.  The patient does state that he drank alcohol since returning home from the interventional radiology suite.  He denies that he returned home in order to imbibe alcohol.  His wife's preference would be that he remain at home, although, she would like assistance with helping manage his medical problems within their home.  Also of note, his antimicrobial therapy was disrupted due to the fact that he did not have a prescription for Ceftin at his local pharmacy upon leaving the transitional care unit.  He missed 1.5 days of antibiotic therapy.     Problem list and histories reviewed & adjusted, as indicated.  Additional history: as documented    Patient Active Problem List   Diagnosis     Right knee DJD     Alcohol abuse     Alcoholic cirrhosis of liver (H)     Chronic kidney disease, stage III (moderate)     Physical deconditioning     CAD, MI in 2007 -- S/P CABG x 3 in 2007     Prostate cancer -- S/P Radiative Seed implants in 2000 (no problems since)     Antiphospholipid Jina Syn, Hypercoag (DVT, PE) -- has IVC filt and Warfarin     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 (H)     Thoracic aortic aneurysm without rupture (H)     Chronic congestive heart failure, unspecified congestive heart failure type (H)     Abscess of Left BKA -- S/P I&D 12/1/17, MSSA     Paroxysmal a-fib (H)     Cellulitis and abscess  of leg     Thrombocytopenia -- suspect related to alcohol use     Abscess     Infection of left below knee amputation (H)     Lymphedema     Anemia due to blood loss, acute     Yeast infection of the skin     Pressure ulcer, stage 2     Hyperkalemia     Confusion     Fall     S/P craniotomy     Subdural hematoma (H)     Benign essential hypertension     Alcohol dependence in remission (H)     UTI (urinary tract infection)     Past Surgical History:   Procedure Laterality Date     AMPUTATE LEG BELOW KNEE Left 04/2014    related to gangrene, started as foot ulcer related to neuropathy     AMPUTATE LEG BELOW KNEE Left 12/4/2017    Procedure: AMPUTATE LEG BELOW KNEE;  Exploration of Left Leg Below Knee Amputation Stump with Ligation of Bleeders.;  Surgeon: Leandro Arreola MD;  Location:  OR     APPENDECTOMY       APPLY WOUND VAC Left 12/6/2017    Procedure: APPLY WOUND VAC;;  Surgeon: Saima Howard MD;  Location:  SD     ARTHROPLASTY KNEE Right 9/19/2014    Procedure: ARTHROPLASTY KNEE;  Surgeon: Saima Howard MD;  Location:  OR     CARDIAC SURGERY      CABG     CHOLECYSTECTOMY       COLONOSCOPY       CRANIOTOMY Right 4/7/2018    Procedure: CRANIOTOMY;  Right Craniotomy For Subdural Hematoma;  Surgeon: Jono Issa MD;  Location:  OR     CYSTOSCOPY, DILATE URETHRA, COMBINED N/A 10/12/2016    Procedure: COMBINED CYSTOSCOPY, DILATE URETHRA;  Surgeon: Dimitry Bello MD;  Location:  OR     ENDOSCOPIC STRIPPING VEIN(S)       esophagogastroduodenoscopy       EYE SURGERY      cataracts     GENITOURINARY SURGERY      Prostate Seen Implants     HERNIA REPAIR      Umbilical     INCISION AND DRAINAGE LOWER EXTREMITY, COMBINED Left 12/1/2017    Procedure: COMBINED INCISION AND DRAINAGE LOWER EXTREMITY;  INCISION AND DRAINAGE OF LEFT BELOW KNEE AMPUTATION ABSCESS, WOUND VAC PLACEMENT;  Surgeon: Saima Howard MD;  Location:  OR     IR IVC FILTER PLACEMENT       IRRIGATION AND DEBRIDEMENT  LOWER EXTREMITY, COMBINED Left 2017    Procedure: COMBINED IRRIGATION AND DEBRIDEMENT LOWER EXTREMITY;  IRRIGATION AND DEBRIDEMENT OF LEFT BELOW KNEE AMPUTATION SITE WITH WOUND VAC REPLACEMENT;  Surgeon: Saima Howard MD;  Location:  SD     IRRIGATION AND DEBRIDEMENT LOWER EXTREMITY, COMBINED Left 2017    Procedure: COMBINED IRRIGATION AND DEBRIDEMENT LOWER EXTREMITY;  IRRIGATION AND DEBRIDEMENT OF LEFT BELOW THE KNEE AMPUTATION AND SHORTENING OF THE TIBIA WITH WOUND CLOSURE;  Surgeon: Saima Howard MD;  Location:  OR     ORTHOPEDIC SURGERY      (R) knee scope     ORTHOPEDIC SURGERY      total hip      ORTHOPEDIC SURGERY      elbow surgery       Social History   Substance Use Topics     Smoking status: Never Smoker     Smokeless tobacco: Never Used     Alcohol use Yes      Comment: quit 10/2015; now 5-6 Vodkas/night     Family History   Problem Relation Age of Onset     Lung Cancer Mother      Heart Failure Father       age 87         Current Outpatient Prescriptions   Medication Sig Dispense Refill     ASPIRIN NOT PRESCRIBED (INTENTIONAL) Please choose reason not prescribed, below 0 each 0     carvedilol (COREG) 3.125 MG tablet Take 1 tablet (3.125 mg) by mouth 2 times daily (with meals) 180 tablet 1     cefuroxime (CEFTIN) 500 MG tablet Take 1 tablet (500 mg) by mouth 2 times daily 14 tablet 0     fluticasone (FLONASE) 50 MCG/ACT spray Spray 2 sprays into both nostrils daily       folic acid (FOLVITE) 1 MG tablet Take 1 tablet (1 mg) by mouth daily 30 tablet      GABAPENTIN PO Take 600 mg by mouth 3 times daily        nystatin (MYCOSTATIN) cream Apply topically 3 times daily for 14 days 30 g 11     oxyCODONE IR (ROXICODONE) 5 MG tablet Take 1 tablet (5 mg) by mouth every 6 hours as needed for other (pain control or improvement in physical function. Hold dose for analgesic side effects.) 6 tablet 0     phenazopyridine (PYRIDIUM) 100 MG tablet Take 1 tablet (100 mg) by mouth 3 times daily  "as needed for urinary tract discomfort 90 tablet 3     rivaroxaban ANTICOAGULANT (XARELTO) 10 MG TABS tablet Take 1 tablet (10 mg) by mouth daily (with dinner)       thiamine 100 MG tablet Take 1 tablet (100 mg) by mouth daily       torsemide (DEMADEX) 20 MG tablet Take 20 mg by mouth daily       Allergies   Allergen Reactions     Hmg-Coa-R Inhibitors Other (See Comments)     Rhabdomyolysis occurred within a couple days     Ciprofloxacin Itching     Severe itching \"like ants were crawling\"       Reviewed and updated as needed this visit by clinical staff       Reviewed and updated as needed this visit by Provider         ROS:  Constitutional, HEENT, cardiovascular, pulmonary, gi and gu systems are negative, except as otherwise noted.    OBJECTIVE:     /56  Pulse 67  Temp 97.7  F (36.5  C) (Oral)  Ht 6' 2\" (1.88 m)  Wt 226 lb 3.2 oz (102.6 kg)  SpO2 99%  BMI 29.04 kg/m2  Body mass index is 29.04 kg/(m^2).  General: This is a chronically ill-appearing man in no acute distress.  He does not appear toxic.  He speaks in full sentences.  Cardiovascular: The heart has an irregularly irregular rhythm.  Pulmonary: The lungs are clear to auscultation bilaterally, breathing is not labored.  Extremities: There is mild edema in the bilateral thighs.  The patient is status post a left leg amputation.  : There is erythema and shallow areas of skin breakdown in the groin, I do not appreciate any severe scrotal edema.  Neurological: Alert and oriented to person place and time, cranial nerves II to XII appear grossly intact, the patient walks with a walker, gait is mildly unsteady.  No focal weakness.  Mental status: Normal mood and affect, moderate to severely impaired insight and judgment, well-groomed, normal speech, no hallucinations, no suicidal ideation.    Diagnostic Test Results:  Results for orders placed or performed during the hospital encounter of 05/21/18   Chest XR,  PA & LAT    Narrative    CHEST TWO " VIEWS   5/21/2018 6:05 PM     HISTORY: Fever.    COMPARISON: 4/7/2018.    FINDINGS: A small hazy opacity in the posterior aspect of the lower  right lung, best visualized on the lateral projection image. Blunting  of the right costophrenic angle. The lungs are otherwise clear.  Borderline cardiomegaly. Atherosclerotic calcification in the thoracic  aorta. Prior median sternotomy.      Impression    IMPRESSION:   1. A small hazy opacity in the posterior aspect of the lower right  lung is nonspecific, but pneumonia is possible. A follow-up chest  radiograph would be useful in one month to confirm resolution of this  finding.  2. Blunting of the right costophrenic angle, suggestive of a small  right pleural effusion.    RONALD ALMEIDA MD   CT Abdomen Pelvis w/o Contrast    Narrative    CT ABDOMEN AND PELVIS WITHOUT CONTRAST 5/22/2018 4:02 PM     HISTORY: UTI, sepsis. Worsened Creatinine.      COMPARISON: None.    TECHNIQUE: Axial CT images of the abdomen and pelvis from the  diaphragm to the symphysis pubis were acquired without IV contrast.  Radiation dose for this scan was reduced using automated exposure  control, adjustment of the mA and/or kV according to patient size, or  iterative reconstruction technique.    FINDINGS: There is a stone which measures 1.1 cm in maximal axial  dimension and 1.7 cm in length in the proximal left ureter. Just above  this is a 7 mm stone on the left. There is mild left hydronephrosis.  In the distal right ureter there are two stones adjacent to one  another measuring 8 mm in maximal dimension. There is mild-moderate  hydronephrosis proximal to this on the right. There are approximately  eight nonobstructing stones in the left kidney measuring up to 8 mm.  No intrarenal stones are seen on the right. There is mild bilateral  perinephric fat stranding. Kidneys are normal in size and  configuration.  There is moderate diverticulosis without evidence for  diverticulitis. There are no  dilated loops of small intestine or large  bowel to suggest ileus or obstruction. Minimal free fluid. No free  air. Somewhat lobular contour to the liver noted which is nonspecific.  Cholecystectomy changes. There are extensive atherosclerotic changes  of the visualized aorta and its branches. There is no evidence of  aortic aneurysm.  No splenomegaly.  No definite adrenal nodules.   Pancreas grossly unremarkable. The remainder of the visualized abdomen  is unremarkable on this noncontrast scan. Survey of the visualized  bony structures demonstrates no destructive bony lesions. Small right  pleural effusion with associated compressive atelectasis and/or  infiltrate. Minimal probable atelectasis associated with calcified  pleural plaque at the left base. There are extensive coronary vascular  calcifications and/or stents consistent with coronary artery disease.      Impression    IMPRESSION:   1. Stones measuring 1.1 and 0.7 cm in maximal axial dimension are seen  in the proximal left ureter. There is mild proximal hydronephrosis.  2. Two stones measuring 8 mm in maximal axial dimension are seen in  the distal right ureter with mild-moderate proximal hydronephrosis.    CARMEN SERRATO MD   IR Nephrostomy Tube Placement Bilateral    Narrative    PROCEDURE(S):  1. Left antegrade nephrostogram  2. Placement of 8French left percutaneous nephrostomy tube   3. Right antegrade nephrostogram  4. Placement of 8French right percutaneous nephrostomy tube    DATE OF PROCEDURE: 5/22/2018    MEDICATIONS: 15 mL 1% lidocaine SQ, Versed 3 mg IV, Fentanyl 150 mcg  IV, patient was receiving antibiotics on the floor.    CONTRAST: 30 mL Isovue 300 into the renal collecting system.    FLUOROSCOPY TIME: 5.9 minutes, (19 mGy).    COMPLICATIONS: None.    CLINICAL HISTORY/INDICATION: 73-year-old male with urosepsis and  obstructing ureteral stones.    PROCEDURES AND FINDINGS:  Following a discussion of the risks,  benefits, indications and  "alternatives to treatment, appropriate  informed consent was obtained.  The patient was brought to the  interventional radiology suite and placed on the table. The bilateral  flank were prepped and draped in usual sterile fashion. A time out was  performed per universal protocol policy to ensure correct patient,  site and procedure to be performed.     A preliminary ultrasound of the left flank was performed which  demonstrates no hydronephrosis. Echogenic shadowing stones are noted.   Ultrasound images were archived.  Then, under ultrasound guidance an  appropriate site was marked in the left lateral lower back region for  needle placement and this region was subsequently infiltrated with 1%  lidocaine for local anesthesia. Under direct ultrasound guidance, a  21-gauge needle was inserted into the upper pole of the calyx until  urine return was noted. A small amount of contrast was then injected,  which demonstrates no hydronephrosis or hydroureter. There is a  filling defect in the proximal ureter consistent with known stone.  Contrast flows easily down the ureter into the bladder.  A 0.018\" wire  was then advanced through the needle and coiled within the renal  pelvis. The needle was then exchanged for a coaxial Guillermo set, which  was advanced over the wire through the calyx into the renal pelvis.  The 0.018\" wire was placed to the side as a safety wire and a 0.035\"  guidewire was advanced into the renal pelvis. The tract was serially  dilated with final placement of a 8 Romansh nephrostomy tube with the  tip coiled in the renal pelvis. Injection of contrast demonstrates  pigtail is centered within the renal pelvis.  The contrast was  aspirated and the tube was placed to gravity bag drainage. The  catheter was secured to the skin using a 2-0 Ethilon and a sterile  dressing was applied.      Attention was then turned to the right kidney. Preliminary ultrasound  of the right flank demonstrates mild hydronephrosis. An " "appropriate  site was marked in the right lower back region for needle placement  and this region was then infiltrated with 1% lidocaine. Under direct  ultrasound guidance, a 21-gauge needle was inserted into the lower  pole of the dilated calyx until urine return was noted.  A small  amount of contrast was then injected which demonstrates mild  hydronephrosis and hydroureter. Filling defect is seen in the distal  ureter consistent with known stone. Contrast flows easily down the  ureter into the bladder.  A 0.018\" wire was then advanced through the  needle and coiled within the renal pelvis. The needle was then  exchanged for a coaxial Guillermo set, which was advanced over the wire  through the calyx into the renal pelvis. The 0.018 wire was placed to  the side as a safety wire and a 0.035\" guidewire was advanced into the  renal pelvis. The tract was serially dilated with final placement of  an 8 Sudanese nephrostomy tube with the tip coiled in the renal pelvis.  Injection of contrast demonstrates the pigtail centered within the  renal pelvis.  The contrast was aspirated and the tube was placed to  gravity bag drainage. The catheter was secured to the skin using a 2-0  Ethilon and a sterile dressing was applied.      Throughout the procedure, the patient was monitored by a radiology  nurse for cardiac rhythm and oxygen saturation which remained stable.  Total sedation time was 52 minutes The patient tolerated the procedure  well and left interventional radiology in stable condition.      Impression    IMPRESSION:  1. Successful placement of a 8 Sudanese left percutaneous nephrostomy  tube. Left antegrade nephrostogram demonstrates no hydronephrosis or  hydroureter. The left collecting system is patent. Contrast flows  easily down the ureter into the bladder without evidence of  obstruction. Filling defect is seen in the proximal left ureter,  consistent with known stone.  2. Successful placement of a right Sudanese right " percutaneous  nephrostomy tube. Right antegrade nephrostogram demonstrates mild to  moderate hydronephrosis and hydroureter. The right collecting system  is patent. Contrast flows easily down the ureter into the bladder  without evidence of obstruction. Filling defect is seen in the distal  right ureter, consistent with known stone.    PRETTY JO DO   IR Ureteral Stent Placement Bilateral    Narrative    INTERVENTIONAL RADIOLOGY URETERAL STENT PLACEMENT BILATERAL 5/24/2018  9:34 AM    HISTORY: 73-year-old patient presenting with urosepsis on May 22,  2018. Patient had bilateral percutaneous nephrostomy tubes placed at  that time. Patient is relatively large, though not completely  obstructing calcifications in each ureter. Plan is for placement of  ureteral stent in exchange of nephrostomy tubes.    TECHNIQUE: Patient was brought to the interventional radiology  department and informed consent obtained. Patient was placed in a  prone position. Each percutaneous nephrostomy tube and surrounding  skin were prepped and draped in standard sterile fashion. 1% lidocaine  was administered around each tube. Spot fluoroscopic images were  obtained before and after contrast injection through each tube. Each  tube was cut and a stiff angled Glidewire was advanced through the  tube and into the bladder. 8 Somali x 26 cm ureteral stents were then  advanced over the Glidewire into the bladder. Glidewire was maintained  in the renal collecting systems, over which new bilateral 8 Somali  percutaneous nephrostomy tubes were deployed into each renal pelvis.  Contrast was again injected to confirm appropriate position. Each  percutaneous nephrostomy tube was secured in position with Prolene  suture. All 4 tubes were confirmed to be in good position by  completion.    Sedation: 1.5 mg IV Versed and 50 mcg IV fentanyl.  Sedation time: 45 minutes.    Please note the above medications were administered by the  interventional  radiology staff under my direct supervision. Patient's  vital signs were monitored and remained stable throughout the  procedure.    Fluoroscopic time: 5.9 minutes  Total fluoroscopic dose: 42 mGy  Contrast: 40 mL of Isovue administered into each collecting system  without complication.  Local anesthetic: 20 mL of 1% lidocaine.    FINDINGS: Total of 9 spot fluoroscopic images were obtained throughout  the procedure. Initial nephrostograms demonstrate nondilated renal  collecting systems and patent ureters. At completion, bilateral  ureteral stents in good position as well as exchanged 8 Cambodian  percutaneous nephrostomy tubes.      Impression    IMPRESSION:  1. Initial bilateral nephrostograms demonstrate patent ureters without  hydronephrosis.  2. Successful placement of bilateral 8 Cambodian x 26 cm ureteral stents.  3. Successful exchange of bilateral 8 Cambodian percutaneous nephrostomy  tubes.    The nephrostomy tubes are to be kept to gravity drainage with periodic  flushing. The tubes are to remain in place for several days given  patient's recent history of urosepsis and inconsistent drainage  through the nephrostomy tubes. Patient will return early next week for  repeat nephrostograms and hopeful nephrostomy tube removal.    NICK LEBLANC MD   IR Nephrostomy Tube Removal Bilateral    Narrative    IR NEPHROSTOMY TUBE REMOVAL BILATERAL   5/29/2018 8:20 AM     CLINICAL HISTORY/INDICATION: Bilateral ureteral stones, antegrade  stents placed 5/24.    PROCEDURES PERFORMED: Bilateral antegrade nephrostogram. Bilateral  nephrostomy tube removal.    MEDICATIONS: None    CONTRAST: 15 mL of Isovue into the renal collecting systems    FLUORO: 0.3 minutes    IMAGES/DOSE: 47 mGy     CONSENT: Following a discussion of the risks, benefits, indications  and alternatives to treatment, appropriate informed consent was  obtained.      TIMEOUT: A timeout was performed per universal protocol policy to  ensure correct patient, site,  and procedure to be performed. Patient  was placed prone on the table. A  image was obtained which showed  bilateral pigtail catheters and bilateral ureteral stents in place.    Contrast was injected into the right nephrostomy tube. Images show no  hydronephrosis or hydroureter. Contrast flows easily down the ureter  into the bladder. The existing tube and sutures were cut. The right  nephrostomy tube was removed under fluoroscopic guidance. A sterile  dressing was applied.    Contrast was then injected into the left nephrostomy tube. Images show  no hydronephrosis or hydroureter. Contrast flows easily down the  ureter into the bladder. The existing tube and sutures were cut. The  left nephrostomy tube was removed under fluoroscopic guidance. A  sterile dressing was applied.      Impression    IMPRESSION: Patent bilateral renal collecting systems without evidence  of hydronephrosis, hydroureter or obstruction. Successful bilateral  nephrostomy tube removal. Bilateral double-J ureteral stents remain in  place.   CBC with platelets differential   Result Value Ref Range    WBC 7.0 4.0 - 11.0 10e9/L    RBC Count 4.13 (L) 4.4 - 5.9 10e12/L    Hemoglobin 11.0 (L) 13.3 - 17.7 g/dL    Hematocrit 34.3 (L) 40.0 - 53.0 %    MCV 83 78 - 100 fl    MCH 26.6 26.5 - 33.0 pg    MCHC 32.1 31.5 - 36.5 g/dL    RDW 18.4 (H) 10.0 - 15.0 %    Platelet Count 119 (L) 150 - 450 10e9/L    Diff Method Automated Method     % Neutrophils 81.3 %    % Lymphocytes 7.3 %    % Monocytes 10.6 %    % Eosinophils 0.0 %    % Basophils 0.7 %    % Immature Granulocytes 0.1 %    Nucleated RBCs 0 0 /100    Absolute Neutrophil 5.6 1.6 - 8.3 10e9/L    Absolute Lymphocytes 0.5 (L) 0.8 - 5.3 10e9/L    Absolute Monocytes 0.7 0.0 - 1.3 10e9/L    Absolute Eosinophils 0.0 0.0 - 0.7 10e9/L    Absolute Basophils 0.1 0.0 - 0.2 10e9/L    Abs Immature Granulocytes 0.0 0 - 0.4 10e9/L    Absolute Nucleated RBC 0.0    Comprehensive metabolic panel   Result Value Ref  Range    Sodium 138 133 - 144 mmol/L    Potassium 4.0 3.4 - 5.3 mmol/L    Chloride 104 94 - 109 mmol/L    Carbon Dioxide 25 20 - 32 mmol/L    Anion Gap 9 3 - 14 mmol/L    Glucose 118 (H) 70 - 99 mg/dL    Urea Nitrogen 19 7 - 30 mg/dL    Creatinine 1.22 0.66 - 1.25 mg/dL    GFR Estimate 58 (L) >60 mL/min/1.7m2    GFR Estimate If Black 70 >60 mL/min/1.7m2    Calcium 9.0 8.5 - 10.1 mg/dL    Bilirubin Total 1.7 (H) 0.2 - 1.3 mg/dL    Albumin 3.2 (L) 3.4 - 5.0 g/dL    Protein Total 7.5 6.8 - 8.8 g/dL    Alkaline Phosphatase 105 40 - 150 U/L    ALT 12 0 - 70 U/L    AST 32 0 - 45 U/L   Lactic acid whole blood   Result Value Ref Range    Lactic Acid 2.2 (H) 0.7 - 2.0 mmol/L   UA with Microscopic   Result Value Ref Range    Color Urine Yellow     Appearance Urine Slightly Cloudy     Glucose Urine Negative NEG^Negative mg/dL    Bilirubin Urine Negative NEG^Negative    Ketones Urine Negative NEG^Negative mg/dL    Specific Gravity Urine 1.014 1.003 - 1.035    Blood Urine Large (A) NEG^Negative    pH Urine 7.0 5.0 - 7.0 pH    Protein Albumin Urine 100 (A) NEG^Negative mg/dL    Urobilinogen mg/dL 2.0 0.0 - 2.0 mg/dL    Nitrite Urine Negative NEG^Negative    Leukocyte Esterase Urine Large (A) NEG^Negative    Source Midstream Urine     WBC Urine >182 (H) 0 - 5 /HPF    RBC Urine 117 (H) 0 - 2 /HPF    WBC Clumps Present (A) NEG^Negative /HPF    Bacteria Urine Many (A) NEG^Negative /HPF    Squamous Epithelial /HPF Urine 1 0 - 1 /HPF    Mucous Urine Present (A) NEG^Negative /LPF   Troponin I   Result Value Ref Range    Troponin I ES 0.027 0.000 - 0.045 ug/L   Lactic acid   Result Value Ref Range    Lactic Acid 1.0 0.7 - 2.0 mmol/L   Basic metabolic panel   Result Value Ref Range    Sodium 139 133 - 144 mmol/L    Potassium 3.9 3.4 - 5.3 mmol/L    Chloride 109 94 - 109 mmol/L    Carbon Dioxide 22 20 - 32 mmol/L    Anion Gap 8 3 - 14 mmol/L    Glucose 106 (H) 70 - 99 mg/dL    Urea Nitrogen 23 7 - 30 mg/dL    Creatinine 1.69 (H) 0.66 -  1.25 mg/dL    GFR Estimate 40 (L) >60 mL/min/1.7m2    GFR Estimate If Black 48 (L) >60 mL/min/1.7m2    Calcium 8.1 (L) 8.5 - 10.1 mg/dL   CBC with platelets   Result Value Ref Range    WBC 10.6 4.0 - 11.0 10e9/L    RBC Count 3.78 (L) 4.4 - 5.9 10e12/L    Hemoglobin 10.0 (L) 13.3 - 17.7 g/dL    Hematocrit 31.6 (L) 40.0 - 53.0 %    MCV 84 78 - 100 fl    MCH 26.5 26.5 - 33.0 pg    MCHC 31.6 31.5 - 36.5 g/dL    RDW 18.7 (H) 10.0 - 15.0 %    Platelet Count 80 (L) 150 - 450 10e9/L   Hepatic panel   Result Value Ref Range    Bilirubin Direct 0.7 (H) 0.0 - 0.2 mg/dL    Bilirubin Total 1.5 (H) 0.2 - 1.3 mg/dL    Albumin 2.8 (L) 3.4 - 5.0 g/dL    Protein Total 6.6 (L) 6.8 - 8.8 g/dL    Alkaline Phosphatase 89 40 - 150 U/L    ALT 16 0 - 70 U/L    AST 41 0 - 45 U/L   Magnesium   Result Value Ref Range    Magnesium 1.5 (L) 1.6 - 2.3 mg/dL   Fractional excretion of sodium   Result Value Ref Range    Creatinine Urine 234 mg/dL    Sodium Urine mmol/L 12 mmol/L    %FENA <0.1 %   Basic metabolic panel   Result Value Ref Range    Sodium 141 133 - 144 mmol/L    Potassium 4.2 3.4 - 5.3 mmol/L    Chloride 111 (H) 94 - 109 mmol/L    Carbon Dioxide 24 20 - 32 mmol/L    Anion Gap 6 3 - 14 mmol/L    Glucose 127 (H) 70 - 99 mg/dL    Urea Nitrogen 26 7 - 30 mg/dL    Creatinine 1.85 (H) 0.66 - 1.25 mg/dL    GFR Estimate 36 (L) >60 mL/min/1.7m2    GFR Estimate If Black 43 (L) >60 mL/min/1.7m2    Calcium 7.8 (L) 8.5 - 10.1 mg/dL   Basic metabolic panel   Result Value Ref Range    Sodium 141 133 - 144 mmol/L    Potassium 4.1 3.4 - 5.3 mmol/L    Chloride 112 (H) 94 - 109 mmol/L    Carbon Dioxide 23 20 - 32 mmol/L    Anion Gap 6 3 - 14 mmol/L    Glucose 129 (H) 70 - 99 mg/dL    Urea Nitrogen 26 7 - 30 mg/dL    Creatinine 1.82 (H) 0.66 - 1.25 mg/dL    GFR Estimate 36 (L) >60 mL/min/1.7m2    GFR Estimate If Black 44 (L) >60 mL/min/1.7m2    Calcium 7.7 (L) 8.5 - 10.1 mg/dL   CBC with platelets differential   Result Value Ref Range    WBC 6.7 4.0 - 11.0  10e9/L    RBC Count 3.58 (L) 4.4 - 5.9 10e12/L    Hemoglobin 9.5 (L) 13.3 - 17.7 g/dL    Hematocrit 30.5 (L) 40.0 - 53.0 %    MCV 85 78 - 100 fl    MCH 26.5 26.5 - 33.0 pg    MCHC 31.1 (L) 31.5 - 36.5 g/dL    RDW 19.2 (H) 10.0 - 15.0 %    Platelet Count 83 (L) 150 - 450 10e9/L    Diff Method Automated Method     % Neutrophils 82.1 %    % Lymphocytes 7.5 %    % Monocytes 9.1 %    % Eosinophils 0.4 %    % Basophils 0.6 %    % Immature Granulocytes 0.3 %    Nucleated RBCs 0 0 /100    Absolute Neutrophil 5.5 1.6 - 8.3 10e9/L    Absolute Lymphocytes 0.5 (L) 0.8 - 5.3 10e9/L    Absolute Monocytes 0.6 0.0 - 1.3 10e9/L    Absolute Eosinophils 0.0 0.0 - 0.7 10e9/L    Absolute Basophils 0.0 0.0 - 0.2 10e9/L    Abs Immature Granulocytes 0.0 0 - 0.4 10e9/L    Absolute Nucleated RBC 0.0    Basic metabolic panel   Result Value Ref Range    Sodium 139 133 - 144 mmol/L    Potassium 4.0 3.4 - 5.3 mmol/L    Chloride 109 94 - 109 mmol/L    Carbon Dioxide 23 20 - 32 mmol/L    Anion Gap 7 3 - 14 mmol/L    Glucose 99 70 - 99 mg/dL    Urea Nitrogen 31 (H) 7 - 30 mg/dL    Creatinine 1.75 (H) 0.66 - 1.25 mg/dL    GFR Estimate 38 (L) >60 mL/min/1.7m2    GFR Estimate If Black 46 (L) >60 mL/min/1.7m2    Calcium 7.6 (L) 8.5 - 10.1 mg/dL   Basic metabolic panel   Result Value Ref Range    Sodium 136 133 - 144 mmol/L    Potassium 4.2 3.4 - 5.3 mmol/L    Chloride 106 94 - 109 mmol/L    Carbon Dioxide 22 20 - 32 mmol/L    Anion Gap 8 3 - 14 mmol/L    Glucose 99 70 - 99 mg/dL    Urea Nitrogen 31 (H) 7 - 30 mg/dL    Creatinine 1.70 (H) 0.66 - 1.25 mg/dL    GFR Estimate 39 (L) >60 mL/min/1.7m2    GFR Estimate If Black 48 (L) >60 mL/min/1.7m2    Calcium 7.4 (L) 8.5 - 10.1 mg/dL     *Note: Due to a large number of results and/or encounters for the requested time period, some results have not been displayed. A complete set of results can be found in Results Review.       ASSESSMENT/PLAN:         ICD-10-CM    1. Urinary tract infection without hematuria,  site unspecified N39.0 cefuroxime (CEFTIN) 500 MG tablet     phenazopyridine (PYRIDIUM) 100 MG tablet     HOME CARE NURSING REFERRAL     UROLOGY ADULT REFERRAL   2. Bacteremia due to Enterococcus R78.81 cefuroxime (CEFTIN) 500 MG tablet   3. Alcoholic cirrhosis of liver without ascites (H) K70.30    4. Candidal intertrigo B37.2 nystatin (MYCOSTATIN) cream   5. Nephrolithiasis N20.0 UROLOGY ADULT REFERRAL     I spent about 40 minutes of face-to-face contact with this patient and his wife more than 50% of which was spent counseling and coordinating care.  He left the transitional care unit AMA although he claims not intentionally and his wife corroborates that claim.  I asked both of them if they wanted me to arrange for him to be readmitted to a transitional care unit to complete his convalescence from his sepsis hospitalization.  They both declined understanding the risks associated with declining that recommendation.  He seems to be improving from his infection and sepsis syndrome.  He was instructed that he should return to taking antibiotic therapy as soon as possible and a new prescription for Ceftin was sent to his pharmacy for pickup tonight.  His urinary tract pathogen is sensitive to cephalosporins.  He will need further attention to his kidney stones and stent removal.  I gave his wife and him the contact information for [facility] to schedule an appointment with either Dr. Bello who is his regular urologist or Dr. Howe who saw him in the hospital as soon as possible.  I am very concerned about reports of alcohol withdrawal syndrome observed in the hospital, and his ongoing use of alcohol.  I spent a great deal of time counseling him on the importance of alcohol cessation.  I also recommended that he receive chemical dependency treatment to help him stop drinking alcohol.  I suggested either [facility] or the [facility] chemical dependency programs.  I told him that I thought that he had  alcoholism and that his use of alcohol is very dangerous and likely to shorten his life and impede his goal of getting his right hip replaced.  I told him that unless he can stop drinking alcohol, he will not be a candidate for elective surgery due to concern that he may have alcohol withdrawal following his elective surgery which could complicate surgery in a patient who is already at high risk for complications from surgery due to multiple comorbid conditions.  He has mild skin breakdown in his perineal area and I educated his wife on how to provide local cares to protect the skin.  He would benefit from home care services for ongoing monitoring of his perineal skin and help with the surgical wounds from his nephrostomy tubes.    With everything going on, I think that short-term follow-up is necessary.  He will return to see me in 7-10 days.  Until then, since he does not want to return to TCU, I recommended 24 hour per day 7 days per week monitoring by his wife or other family members or other individuals because he is at high risk for falls and injury.    Main Hardy MD  [facility]

## 2018-05-30 NOTE — MR AVS SNAPSHOT
After Visit Summary   5/30/2018    Antonio Ramirez    MRN: [id number]           Patient Information     Date Of Birth          1945        Visit Information        Provider Department      5/30/2018 4:30 PM Main Hardy MD [facility] [place]        Today's Diagnoses     Urinary tract infection without hematuria, site unspecified    -  1    Bacteremia due to Enterococcus        Alcoholic cirrhosis of liver without ascites (H)        Candidal intertrigo        Nephrolithiasis          Care Instructions    Apply nystatin cream to red areas in groin followed by a barrier cream such as Desitin.  Do this three times daily until the skin heals.            Follow-ups after your visit        Additional Services     HOME CARE NURSING REFERRAL       **Order classes of: [facility], [facility] and [facility] will route to the Home Care and Hospice Referral Pool.  Home Care or Hospice will then contact the patient to schedule their appointment.**    If you do not hear from Home Care and Hospice, or you would like to call to schedule, please call the referring place of service that your provider has listed below.  ______________________________________________________________________    Your provider has referred you to: [facility]: [facility] [phone]   [email or web address]    Extended Emergency Contact Information  Primary Emergency Contact: Helena Ramirez  Address: [address] [place] [id number] [facility]  Home Phone: [phone]  Work Phone: none  Mobile Phone: [phone]  Relation: Spouse  Secondary Emergency Contact: María Ramirez   [facility]  Home Phone: [phone]  Relation: Daughter    Patient Anticipated Discharge Date: 5/30/18   RN, PT, HHA to begin 24 - 48 hours after discharge.  PLEASE EVALUATE AND TREAT (Evaluation timeline is 24 - 48 hrs. Please call if there is need for a variance to  this timeline).    REASON FOR REFERRAL: Assessment & Treatment: PT and RN    ADDITIONAL SERVICES NEEDED: OT    OTHER PERTINENT INFORMATION: Patient was last seen by provider on 5/30/18 for entercoccus urinary tract infection with bacteremia and sepsis .    Current Outpatient Prescriptions:  ASPIRIN NOT PRESCRIBED (INTENTIONAL), Please choose reason not prescribed, below, Disp: 0 each, Rfl: 0  carvedilol (COREG) 3.125 MG tablet, Take 1 tablet (3.125 mg) by mouth 2 times daily (with meals), Disp: 180 tablet, Rfl: 1  cefuroxime (CEFTIN) 500 MG tablet, Take 1 tablet (500 mg) by mouth 2 times daily, Disp: 14 tablet, Rfl: 0  fluticasone (FLONASE) 50 MCG/ACT spray, Spray 2 sprays into both nostrils daily, Disp: , Rfl:   folic acid (FOLVITE) 1 MG tablet, Take 1 tablet (1 mg) by mouth daily, Disp: 30 tablet, Rfl:   GABAPENTIN PO, Take 600 mg by mouth 3 times daily , Disp: , Rfl:   nystatin (MYCOSTATIN) cream, Apply topically 3 times daily for 14 days, Disp: 30 g, Rfl: 11  oxyCODONE IR (ROXICODONE) 5 MG tablet, Take 1 tablet (5 mg) by mouth every 6 hours as needed for other (pain control or improvement in physical function. Hold dose for analgesic side effects.), Disp: 6 tablet, Rfl: 0  phenazopyridine (PYRIDIUM) 100 MG tablet, Take 1 tablet (100 mg) by mouth 3 times daily as needed for urinary tract discomfort, Disp: 90 tablet, Rfl: 3  rivaroxaban ANTICOAGULANT (XARELTO) 10 MG TABS tablet, Take 1 tablet (10 mg) by mouth daily (with dinner), Disp: , Rfl:   thiamine 100 MG tablet, Take 1 tablet (100 mg) by mouth daily, Disp: , Rfl:   torsemide (DEMADEX) 20 MG tablet, Take 20 mg by mouth daily, Disp: , Rfl:       Patient Active Problem List:     Right knee DJD     Alcohol abuse     Alcoholic cirrhosis of liver (H)     Chronic kidney disease, stage III (moderate)     Physical deconditioning     CAD, MI in 2007 -- S/P CABG x 3 in 2007     Prostate cancer -- S/P Radiative Seed implants in 2000 (no problems since)      Antiphospholipid Jina Syn, Hypercoag (DVT, PE) -- has IVC filt and Warfarin     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 (H)     Thoracic aortic aneurysm without rupture (H)     Chronic congestive heart failure, unspecified congestive heart failure type (H)     Abscess of Left BKA -- S/P I&D 12/1/17, MSSA     Paroxysmal a-fib (H)     Cellulitis and abscess of leg     Thrombocytopenia -- suspect related to alcohol use     Abscess     Infection of left below knee amputation (H)     Lymphedema     Anemia due to blood loss, acute     Yeast infection of the skin     Pressure ulcer, stage 2     Hyperkalemia     Confusion     Fall     S/P craniotomy     Subdural hematoma (H)     Benign essential hypertension     Alcohol dependence in remission (H)     UTI (urinary tract infection)      Documentation of Face to Face and Certification for Home Health Services    I certify that patient, Antonio Ramirez is under my care and that I, or a Nurse Practitioner or Physician's Assistant working with me, had a face-to-face encounter that meets the physician face-to-face encounter requirements with this patient on: 5/30/2018.    This encounter with the patient was in whole, or in part, for the following medical condition, which is the primary reason for Home Health Care: UTI with sepsis .    I certify that, based on my findings, the following services are medically necessary Home Health Services: Nursing, Occupational Therapy and Physical Therapy    My clinical findings support the need for the above services because: Needs wound check on perineal skin breakdown, needs wound check on nephrostomy incisions, needs PT for deconditioning     Further, I certify that my clinical findings support that this patient is homebound (i.e. absences from home require considerable and taxing effort and are for medical reasons or [employer] services or infrequently or of short duration when for other reasons) because: Leaving home is medically  contraindicated for the following reason(s): Pressure on open wounds during transport impairs healing process..    Based on the above findings, I certify that this patient is confined to the home and needs intermittent skilled nursing care, physical therapy and/or speech therapy.  The patient is under my care, and I have initiated the establishment of the plan of care.  This patient will be followed by a physician who will periodically review the plan of care.    Physician/Provider to provide follow up care: Main Hardy    Responsible [place] certified Physician at time of discharge: Antony      Please be aware that coverage of these services is subject to the terms and limitations of your health insurance plan.  Call member services at your health plan with any benefit or coverage questions.            UROLOGY ADULT REFERRAL       Your provider has referred you to: [facility]: [facility]  [place] [phone]   [email or web address]    Jose Hunter or Dimitry Bello    Please be aware that coverage of these services is subject to the terms and limitations of your health insurance plan.  Call member services at your health plan with any benefit or coverage questions.      Please bring the following with you to your appointment:    (1) Any X-Rays, CTs or MRIs which have been performed.  Contact the facility where they were done to arrange for  prior to your scheduled appointment.    (2) List of current medications  (3) This referral request   (4) Any documents/labs given to you for this referral                  Follow-up notes from your care team     Return in about 10 days (around 6/9/2018) for Routine Visit.      Your next 10 appointments already scheduled     Jun 12, 2018  1:30 PM CDT   Neuro Treatment with Silvia Goel, PAUL   [facility] ([facility])    [address]  [address]  [facility] [address]   [phone]            Jun 19, 2018   Procedure with Saima  "NICHELLE Howard MD   [facility] (--)    [address]  [place] [id number] [phone]            Jun 19, 2018  1:30 PM CDT   Neuro Treatment with Silvia Goel PT   [facility] ([facility])    [address] [place] [address]  [address]  [place] [address] [phone]              Who to contact     If you have questions or need follow up information about today's clinic visit or your schedule please contact [facility] directly at [phone].  Normal or non-critical lab and imaging results will be communicated to you by [employer], letter or phone within 4 business days after the clinic has received the results. If you do not hear from us within 7 days, please contact the clinic through [employer] or phone. If you have a critical or abnormal lab result, we will notify you by phone as soon as possible.  Submit refill requests through [employer] or call your pharmacy and they will forward the refill request to us. Please allow 3 business days for your refill to be completed.          Additional Information About Your Visit        [employer] Information     [employer] gives you secure access to your electronic health record. If you see a primary care provider, you can also send messages to your care team and make appointments. If you have questions, please call your primary care clinic.  If you do not have a primary care provider, please call [phone] and they will assist you.        Care EveryWhere ID     This is your Care EveryWhere ID. This could be used by other organizations to access your [facility] medical records  [id number]        Your Vitals Were     Pulse Temperature Height Pulse Oximetry BMI (Body Mass Index)       67 97.7  F (36.5  C) (Oral) 6' 2\" (1.88 m) 99% 29.04 kg/m2        Blood Pressure from Last 3 Encounters:   05/30/18 126/56   05/29/18 123/74   05/29/18 126/62    Weight from Last 3 Encounters:   05/30/18 226 lb 3.2 oz (102.6 kg) "   05/29/18 215 lb (97.5 kg)   05/29/18 215 lb (97.5 kg)              We Performed the Following     HOME CARE NURSING REFERRAL     UROLOGY ADULT REFERRAL          Today's Medication Changes          These changes are accurate as of 5/30/18  5:32 PM.  If you have any questions, ask your nurse or doctor.               Start taking these medicines.        Dose/Directions    nystatin cream   Commonly known as:  MYCOSTATIN   Used for:  Candidal intertrigo   Started by:  Main Hardy MD        Apply topically 3 times daily for 14 days   Quantity:  30 g   Refills:  11            Where to get your medicines      These medications were sent to [facility]  [place], [place] - [address] AT [address] [place] [address] & [facility]  [address] [place] [address] [place] [address]    Hours:  24-hours Phone:  [phone]     cefuroxime 500 MG tablet    nystatin cream    phenazopyridine 100 MG tablet                Primary Care Provider Office Phone # Fax #    Main Hardy [phone] [address]  [facility] [address]        Equal Access to Services     [facility]: Hadii aad ku hadasho Soomaali, waaxda luqadaha, qaybta kaalmada adeegyada, waxay luis haymayito miller . So [facility] [phone].    ATENCIÓN: Si habla español, tiene a shrestha disposición servicios gratuitos de asistencia lingüística. [facility] [phone].    We comply with applicable federal civil rights laws and [place] laws. We do not discriminate on the basis of race, color, national origin, age, disability, sex, sexual orientation, or gender identity.            Thank you!     Thank you for choosing [facility]  for your care. Our goal is always to provide you with excellent care. Hearing back from our patients is one way we can continue to improve our services. Please take a few minutes to complete the written survey that you may receive in the mail after your visit with us. Thank you!             Your Updated  Medication List - Protect others around you: Learn how to safely use, store and throw away your medicines at www.disposemymeds.org.          This list is accurate as of 5/30/18  5:32 PM.  Always use your most recent med list.                   Brand Name Dispense Instructions for use Diagnosis    ASPIRIN NOT PRESCRIBED    INTENTIONAL    0 each    Please choose reason not prescribed, below    Antiphospholipid antibody syndrome (H)       carvedilol 3.125 MG tablet    COREG    180 tablet    Take 1 tablet (3.125 mg) by mouth 2 times daily (with meals)        cefuroxime 500 MG tablet    CEFTIN    14 tablet    Take 1 tablet (500 mg) by mouth 2 times daily    Urinary tract infection without hematuria, site unspecified, Bacteremia due to Enterococcus       fluticasone 50 MCG/ACT spray    FLONASE     Spray 2 sprays into both nostrils daily        folic acid 1 MG tablet    FOLVITE    30 tablet    Take 1 tablet (1 mg) by mouth daily    Alcohol dependence in remission (H)       GABAPENTIN PO      Take 600 mg by mouth 3 times daily        nystatin cream    MYCOSTATIN    30 g    Apply topically 3 times daily for 14 days    Candidal intertrigo       oxyCODONE IR 5 MG tablet    ROXICODONE    6 tablet    Take 1 tablet (5 mg) by mouth every 6 hours as needed for other (pain control or improvement in physical function. Hold dose for analgesic side effects.)    Urinary obstruction       phenazopyridine 100 MG tablet    PYRIDIUM    90 tablet    Take 1 tablet (100 mg) by mouth 3 times daily as needed for urinary tract discomfort    Urinary tract infection without hematuria, site unspecified       thiamine 100 MG tablet      Take 1 tablet (100 mg) by mouth daily    Alcohol dependence in remission (H)       torsemide 20 MG tablet    DEMADEX     Take 20 mg by mouth daily        XARELTO 10 MG Tabs tablet   Generic drug:  rivaroxaban ANTICOAGULANT      Take 1 tablet (10 mg) by mouth daily (with dinner)

## 2018-05-31 ENCOUNTER — TELEPHONE (OUTPATIENT)
Dept: FAMILY MEDICINE | Facility: CLINIC | Age: 73
End: 2018-05-31

## 2018-05-31 NOTE — TELEPHONE ENCOUNTER
Reason for Call:  Home Health Care    Regina with [facility] called regarding (reason for call): Orders    Orders are needed for this patient. PT, OT and Skilled Nursing    PT: Eval and Treat    OT: Eval and Treat    Skilled Nursing: 3 x week for 2 weeks, 2 x week for 2 weeks with 5 PRN    Reporting a fall-    Color of urine-    Phone Number Homecare Nurse can be reached at: [phone]    Can we leave a detailed message on this number? YES    Best Time: anytime  Call taken on 5/31/2018 at 3:45 PM by Viviane Jones  .

## 2018-05-31 NOTE — TELEPHONE ENCOUNTER
"To PCP: Did pt mention dark (brown) urine during OV? Home Care states pt reported this today, and wanted to notify PCP in the case pt did not mention in his OV yesterday.     S:   Home Care Rn Called requesting Verbal Orders: Verbal approval given per request below.Homecare/hospice agency to fax orders for provider signature.     Also, pt had a fall today, witnessed by Wife and Home Care RN    B: Pt left TCU AMA yesterday, and had OV with PCP last evening    A:   Fall  Today  Did not hit head, no injury. Had non-emergency lift him from floor.   Denied lightheaded/dizziness-   Imbalance triggered the fall, did not have cane or walker.     VSS after fall:   122/66  HR 60   RR 16  O2 97    Dark Urine:   Unwitnessed by Home Care RN, reported by patient.   \"Brown in color\"   Pt states he only drinks 30 ounces of water, Home Care, RN encouraged increase in fluid intake (water, not alcohol)     SHIMON Pagan (Pt's ): [phone]          "

## 2018-06-01 ENCOUNTER — HOSPITAL ENCOUNTER (OUTPATIENT)
Facility: CLINIC | Age: 73
End: 2018-06-01
Attending: ORTHOPAEDIC SURGERY | Admitting: ORTHOPAEDIC SURGERY
Payer: MEDICARE

## 2018-06-04 ENCOUNTER — DOCUMENTATION ONLY (OUTPATIENT)
Dept: CARE COORDINATION | Facility: CLINIC | Age: 73
End: 2018-06-04

## 2018-06-04 NOTE — PROGRESS NOTES
Dear Dr. Shelby Hardy  [facility] process is that all ordered disciplines will be involved in the development of the plan of care.  The following disciplines were unable to see Antonio Ramirez; MRN [id number] within the 5 day evaluation window.  There will be a delay in the evalation visit by    OT    We will notify you when the evaluation is completed.  The patient has been notified.      Sincerely [facility]  Meenu Dnois  [phone]

## 2018-06-07 ENCOUNTER — OFFICE VISIT (OUTPATIENT)
Dept: FAMILY MEDICINE | Facility: CLINIC | Age: 73
End: 2018-06-07
Payer: MEDICARE

## 2018-06-07 VITALS
SYSTOLIC BLOOD PRESSURE: 132 MMHG | DIASTOLIC BLOOD PRESSURE: 73 MMHG | TEMPERATURE: 97.6 F | HEART RATE: 63 BPM | HEIGHT: 74 IN | WEIGHT: 220 LBS | BODY MASS INDEX: 28.23 KG/M2 | OXYGEN SATURATION: 96 %

## 2018-06-07 DIAGNOSIS — I25.119 CORONARY ARTERY DISEASE INVOLVING NATIVE HEART WITH ANGINA PECTORIS, UNSPECIFIED VESSEL OR LESION TYPE (H): ICD-10-CM

## 2018-06-07 DIAGNOSIS — I71.20 THORACIC AORTIC ANEURYSM WITHOUT RUPTURE (H): ICD-10-CM

## 2018-06-07 DIAGNOSIS — D50.9 IRON DEFICIENCY ANEMIA, UNSPECIFIED IRON DEFICIENCY ANEMIA TYPE: ICD-10-CM

## 2018-06-07 DIAGNOSIS — K90.89 POOR IRON ABSORPTION: ICD-10-CM

## 2018-06-07 DIAGNOSIS — N20.0 NEPHROLITHIASIS: ICD-10-CM

## 2018-06-07 DIAGNOSIS — N39.0 URINARY TRACT INFECTION WITH HEMATURIA, SITE UNSPECIFIED: Primary | ICD-10-CM

## 2018-06-07 DIAGNOSIS — R31.9 URINARY TRACT INFECTION WITH HEMATURIA, SITE UNSPECIFIED: Primary | ICD-10-CM

## 2018-06-07 PROCEDURE — 87086 URINE CULTURE/COLONY COUNT: CPT | Performed by: INTERNAL MEDICINE

## 2018-06-07 PROCEDURE — 99213 OFFICE O/P EST LOW 20 MIN: CPT | Performed by: INTERNAL MEDICINE

## 2018-06-07 NOTE — PROGRESS NOTES
"  SUBJECTIVE:   Antonio Ramirez is a 73 year old male who presents to clinic today for the following health issues:      Follow up on UTI     73-year-old man with multiple medical problems including alcoholism, antiphospholipid antibody syndrome, coronary artery disease, thoracic aortic aneurysm, status post below the knee amputation, recent subdural hematoma, recent admission for sepsis due to urinary tract infection complicated by kidney stones.  He presents in follow-up.  He is feeling much better since last visit.  His wife thinks that he is improved as well.  His groin rash is nearly resolved.  He has not had any falls.  He was a bit evasive when asked if he is continuing to drink alcohol.  He was started on a new oral anticoagulant by Dr. Mcintyre.  The nurse that his urology clinic requested that we order a urine culture today to document resolution of his infection.  He does not have a date scheduled for ureteral stent removal or stone removal yet.  He still desires to have his hip replaced.  He is yet to schedule an appointment with his cardiologist although he is overdue.  He and his wife were reminded of this today.  I think he should see his cardiologist at the [facility] before considering elective hip replacement surgery.  I again reminded him that he should stop drinking alcohol.  He has no new complaints today.  He denies fevers or chills.  He denies gross hematuria.  He denies nausea or vomiting.    /73 (BP Location: Right arm, Patient Position: Sitting, Cuff Size: Adult Regular)  Pulse 63  Temp 97.6  F (36.4  C) (Oral)  Ht 6' 2\" (1.88 m)  Wt 220 lb (99.8 kg)  SpO2 96%  BMI 28.25 kg/m2  General: This is a well-appearing man in no acute distress.  He appears improved since last visit.  The nephrostomy incision sites are well-healed.  There is much improved peripheral edema.  His mood and affect seem improved although his insight and judgment remain impaired.      Urine " culture is pending      1. Urinary tract infection with hematuria, site unspecified  Recheck urine culture after course of Ceftin  - Urine Culture Aerobic Bacterial    2. Nephrolithiasis  Follow-up with urology as directed for stone removal/stent removal    3. Iron deficiency anemia, unspecified iron deficiency anemia type  He will need IV iron prior to consideration of elective hip replacement.  I do not see a record of a colonoscopy, this will need to be discussed that his future visit    4. 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  He will contact Dr. humphrey's office for an appointment    5. Thoracic aortic aneurysm without rupture (H)  He should have an echocardiogram to assess the size of his ascending aorta prior to elective surgery    With everything going on, I think it would be reasonable for him to return to see me in 2-3 weeks hopefully he can meet with cardiology soon and have IV iron infusions soon as well.

## 2018-06-07 NOTE — MR AVS SNAPSHOT
After Visit Summary   6/7/2018    Antonio Ramirez    MRN: [id number]           Patient Information     Date Of Birth          1945        Visit Information        Provider Department      6/7/2018 2:00 PM Main Hardy MD [facility]        Today's Diagnoses     Urinary tract infection with hematuria, site unspecified    -  1    Nephrolithiasis        Iron deficiency anemia, unspecified iron deficiency anemia type        Coronary artery disease involving native heart with angina pectoris, unspecified vessel or lesion type (H)        Thoracic aortic aneurysm without rupture (H)        Poor iron absorption           Follow-ups after your visit        Your next 10 appointments already scheduled     Jun 12, 2018 11:00 AM CDT   Neuro Treatment with Silvia Goel, PT   [facility] ([facility])    [address]  [address]  [facility] [id number] [phone]            Jun 19, 2018  1:30 PM CDT   Neuro Treatment with Silvia Goel, PT   [facility] ([facility])    [address]  [address]  [facility] [address]   [phone]            Jun 27, 2018  1:30 PM CDT   [facility] Visit with Main Hardy MD   [facility]    [address] [facility] [id number] [phone]           Bring a current list of meds and any records pertaining to this visit. For Physicals, please bring immunization records and any forms needing to be filled out. Please arrive 10 minutes early to complete paperwork.            Jul 06, 2018   Procedure with Saima Howard MD   [facility] (--)    [address]  [facility] [id number] [phone]              Who to contact     If you have questions or need follow up information about today's clinic visit or your schedule please contact [facility] directly at [phone].  Normal or non-critical lab and  "imaging results will be communicated to you by MyChart, letter or phone within 4 business days after the clinic has received the results. If you do not hear from us within 7 days, please contact the clinic through [employer] or phone. If you have a critical or abnormal lab result, we will notify you by phone as soon as possible.  Submit refill requests through [employer] or call your pharmacy and they will forward the refill request to us. Please allow 3 business days for your refill to be completed.          Additional Information About Your Visit        [employer] Information     [employer] gives you secure access to your electronic health record. If you see a primary care provider, you can also send messages to your care team and make appointments. If you have questions, please call your primary care clinic.  If you do not have a primary care provider, please call [phone] and they will assist you.        Care EveryWhere ID     This is your Care EveryWhere ID. This could be used by other organizations to access your [facility] medical records  [id number]        Your Vitals Were     Pulse Temperature Height Pulse Oximetry BMI (Body Mass Index)       63 97.6  F (36.4  C) (Oral) 6' 2\" (1.88 m) 96% 28.25 kg/m2        Blood Pressure from Last 3 Encounters:   06/07/18 132/73   05/30/18 126/56   05/29/18 123/74    Weight from Last 3 Encounters:   06/07/18 220 lb (99.8 kg)   05/30/18 226 lb 3.2 oz (102.6 kg)   05/29/18 215 lb (97.5 kg)              We Performed the Following     Urine Culture Aerobic Bacterial        Primary Care Provider Office Phone # Fax #    Main Hardy [phone] [address] [facility] [address]  [place] [address]        Equal Access to Services     [facility] Center[facility] : Hadii peyton Cabrera, waraymondda christiane, qaybta kaalmada laverne, igor mix. So [facility] [phone].    ATENCIÓN: Si habla español, tiene a shrestha disposición servicios gratuitos de asistencia " lingüística. Anabelle al [phone].    We comply with applicable federal civil rights laws and [place] laws. We do not discriminate on the basis of race, color, national origin, age, disability, sex, sexual orientation, or gender identity.            Thank you!     Thank you for choosing [facility]  for your care. Our goal is always to provide you with excellent care. Hearing back from our patients is one way we can continue to improve our services. Please take a few minutes to complete the written survey that you may receive in the mail after your visit with us. Thank you!             Your Updated Medication List - Protect others around you: Learn how to safely use, store and throw away your medicines at www.disposemymeds.org.          This list is accurate as of 6/7/18  7:33 PM.  Always use your most recent med list.                   Brand Name Dispense Instructions for use Diagnosis    ASPIRIN NOT PRESCRIBED    INTENTIONAL    0 each    Please choose reason not prescribed, below    Antiphospholipid antibody syndrome (H)       carvedilol 3.125 MG tablet    COREG    180 tablet    Take 1 tablet (3.125 mg) by mouth 2 times daily (with meals)        cefuroxime 500 MG tablet    CEFTIN    14 tablet    Take 1 tablet (500 mg) by mouth 2 times daily    Urinary tract infection without hematuria, site unspecified, Bacteremia due to Enterococcus       fluticasone 50 MCG/ACT spray    FLONASE     Spray 2 sprays into both nostrils daily        folic acid 1 MG tablet    FOLVITE    30 tablet    Take 1 tablet (1 mg) by mouth daily    Alcohol dependence in remission (H)       GABAPENTIN PO      Take 600 mg by mouth 3 times daily        nystatin cream    MYCOSTATIN    30 g    Apply topically 3 times daily for 14 days    Candidal intertrigo       oxyCODONE IR 5 MG tablet    ROXICODONE    6 tablet    Take 1 tablet (5 mg) by mouth every 6 hours as needed for other (pain control or improvement in physical function. Hold  dose for analgesic side effects.)    Urinary obstruction       phenazopyridine 100 MG tablet    PYRIDIUM    90 tablet    Take 1 tablet (100 mg) by mouth 3 times daily as needed for urinary tract discomfort    Urinary tract infection without hematuria, site unspecified       thiamine 100 MG tablet      Take 1 tablet (100 mg) by mouth daily    Alcohol dependence in remission (H)       torsemide 20 MG tablet    DEMADEX     Take 20 mg by mouth daily        XARELTO 10 MG Tabs tablet   Generic drug:  rivaroxaban ANTICOAGULANT      Take 1 tablet (10 mg) by mouth daily (with dinner)

## 2018-06-08 ENCOUNTER — TELEPHONE (OUTPATIENT)
Dept: FAMILY MEDICINE | Facility: CLINIC | Age: 73
End: 2018-06-08

## 2018-06-08 ENCOUNTER — TELEPHONE (OUTPATIENT)
Dept: INFUSION THERAPY | Facility: CLINIC | Age: 73
End: 2018-06-08

## 2018-06-08 PROBLEM — K90.9 MALABSORPTION OF IRON: Status: ACTIVE | Noted: 2018-06-08

## 2018-06-08 LAB
BACTERIA SPEC CULT: NORMAL
SPECIMEN SOURCE: NORMAL

## 2018-06-08 NOTE — TELEPHONE ENCOUNTER
Left message asking patient to call back -  to notify PCP ordered iron infusion - noted there is a documented discussion regarding this in yesterday (6/7/18) OV notes   Also routing to Infusion scheduling to help patient schedule this     Yoly NEW RN

## 2018-06-08 NOTE — TELEPHONE ENCOUNTER
Main Hardy MD  P [facility] Triage                   Please set up IV iron infusion for this patient

## 2018-06-08 NOTE — TELEPHONE ENCOUNTER
Please see Iron order under Admit/Therapy plan.  Please sign and send encounter back to triage so we may contact patient and Infusion Center.  Thank you.  Rosa Cueva RN

## 2018-06-10 NOTE — PROGRESS NOTES
The following letter pertains to your most recent diagnostic tests:    Good news! Your urine culture is negative.  Your previous infection has been successfully treated.        Sincerely,    Dr. Hardy

## 2018-06-11 NOTE — TELEPHONE ENCOUNTER
Patient notified.  He was given the phone no.for scheduling if he doesn't hear from Infusion Center within 24 hours.  Yulisa French RN

## 2018-06-18 ENCOUNTER — TELEPHONE (OUTPATIENT)
Dept: FAMILY MEDICINE | Facility: CLINIC | Age: 73
End: 2018-06-18

## 2018-06-18 NOTE — TELEPHONE ENCOUNTER
Reason for Call:  Other     Detailed comments: pt calling to let Dr know that he is getting kidney stones removed 6/28 can he change his appt on the 27th to a pre op?    Please call to advise    Phone Number Patient can be reached at: Home number on file [phone] (home)    Best Time: anytime    Can we leave a detailed message on this number? YES    Call taken on 6/18/2018 at 12:05 PM by David Ruffin

## 2018-06-19 ENCOUNTER — HOSPITAL ENCOUNTER (OUTPATIENT)
Dept: PHYSICAL THERAPY | Facility: CLINIC | Age: 73
Setting detail: THERAPIES SERIES
End: 2018-06-19
Attending: PHYSICAL MEDICINE & REHABILITATION
Payer: MEDICARE

## 2018-06-19 PROCEDURE — G8978 MOBILITY CURRENT STATUS: HCPCS | Mod: GP,CL | Performed by: PHYSICAL THERAPIST

## 2018-06-19 PROCEDURE — G8979 MOBILITY GOAL STATUS: HCPCS | Mod: GP,CK | Performed by: PHYSICAL THERAPIST

## 2018-06-19 PROCEDURE — 40000719 ZZHC STATISTIC PT DEPARTMENT NEURO VISIT: Performed by: PHYSICAL THERAPIST

## 2018-06-19 PROCEDURE — 97164 PT RE-EVAL EST PLAN CARE: CPT | Mod: GP,XU | Performed by: PHYSICAL THERAPIST

## 2018-06-19 PROCEDURE — 97110 THERAPEUTIC EXERCISES: CPT | Mod: GP | Performed by: PHYSICAL THERAPIST

## 2018-06-19 NOTE — PROGRESS NOTES
06/19/18 1300   Signing Clinician's Name / Credentials   Signing clinician's name / credentials Silvia Goel MPT   Session Number   Session Number 6   Goal 1   Goal Identifier 6MWT   Goal Description Client will be able to ambulate 750 ft using a cane or no AD to indicate improved activity tolerance with community walking   Target Date 06/22/18   Goal 2   Goal Identifier DGI   Goal Description Client will improve score to at least a 14/24 on DGI to help decrease risk of falls.    Target Date 06/22/18   Goal 3   Goal Identifier indoor ambulation   Goal Description Client will be able to ambulate independently and safely indoor distances of 150 ft without an AD    Target Date 06/22/18   Goal 4   Goal Identifier community ambulation   Goal Description Client will be able to ambulate SBA out in the community with cane.    Target Date 06/22/18   Subjective Report   Subjective Report Reports he has gone backwards since he was hospitalized last month.  Admitted with severe sepsis and weakness from 5/21/18 to 5/27/18.   5/29/18 neph tube removal.  Last seen in PT 5/15/18.  Reports has not been doing exs. Did restart Pilates again yesterday and is trying to do some walking.    Objective Measure 1   Objective Measure 6MWT   Details 450 ft with cane   Objective Measure 2   Objective Measure DGI   Details 6/24 (High risk for falling. Did not rest today)    Therapeutic Procedure/exercise   Minutes 35   Skilled Intervention education on importance of consistency of HEP outside of sessions   Patient Response significant weakness   Treatment Detail Sit to/from stand hi lo mat x 10 reps x 2 sets. Intermittant cues to not use UE on legs. Bridges 12 reps. R single leg bridges 12 reps x 1 set,  Sidelying hip abd B 10 reps x 2 sets, B clam exercise 20 reps x 2 sets. Verbally reviewed HEP and then had patient verbally review again at end of the session. See below for details. 6MWT retested as part of re-evaluation   Progress overall  appears weaker.    Plan   Home program HEP: sidelying hip abd, standing hip ext, stand up/sit down.  marching trying to do without UE support. Step out and back to center fwds/laterally and backwards.    Plan for next session continue strengthe and balance   Comments   Comments 10 min re-evaluation in addition to 35 min ther ex.  Goals still appropriate   Total Session Time   Timed Code Treatment Minutes 35   Total Treatment Time (sum of timed and untimed services) 45

## 2018-06-19 NOTE — PROGRESS NOTES
[facility]        OUTPATIENT PHYSICAL THERAPY FUNCTIONAL EVALUATION  PLAN OF TREATMENT FOR OUTPATIENT REHABILITATION  (COMPLETE FOR INITIAL CLAIMS ONLY)  Patient's Last Name, First Name, M.I.  YOB: 1945  Antonio Ramirez     Provider's Name   [facility]   Medical Record No.  [id number]     Start of Care Date:   6/16/18   Onset Date:   5/21/18   Type:     _X__PT   ____OT  ____SLP Medical Diagnosis:   Generalized weakness     PT Diagnosis:   Weakness, Decreased standing balance Visits from SOC:  1                              __________________________________________________________________________________  Plan of Treatment/Functional Goals:      Re-evaluation completed 6/19/18 due to recent hospitalization and change in status.         GOALS  6MWT  Client will be able to ambulate 750 ft using a cane or no AD to indicate improved activity tolerance with community walking  06/22/18    DGI  Client will improve score to at least a 14/24 on DGI to help decrease risk of falls.   06/22/18    indoor ambulation  Client will be able to ambulate independently and safely indoor distances of 150 ft without an AD   06/22/18    community ambulation  Client will be able to ambulate SBA out in the community with cane.   06/22/18                                                Therapy Frequency:    1x/week for   Predicted Duration of Therapy Intervention:   60 days    Silvia Heath, PT                                    I CERTIFY THE NEED FOR THESE SERVICES FURNISHED UNDER        THIS PLAN OF TREATMENT AND WHILE UNDER MY CARE     (Physician co-signature of this document indicates review and certification of the therapy plan).                Certification Date From:    6/19/18  Certification Date To:   8/17/18    Referring Provider:   Dr. Main Hardy    Initial Assessment  See  Epic Evaluation-

## 2018-06-21 NOTE — PROGRESS NOTES
[facility]  [address] [place] [id number]  [phone]  Dept: [phone]    PRE-OP EVALUATION:  Today's date: 2018    Antonio Ramirez (: 1945) presents for pre-operative evaluation assessment as requested by Dr. Jose Daniels.  He requires evaluation and anesthesia risk assessment prior to undergoing surgery/procedure for treatment of cystoscopy bilateral ureteroscopoy, holmium laser .    Proposed Surgery/ Procedure: cystoscopy bilateral ureteroscopoy, holmium laser  Date of Surgery/ Procedure: 2018  Time of Surgery/ Procedure: [id number]  Fax number for surgical facility:   Primary Physician: Main Hardy  Type of Anesthesia Anticipated: to be determined    Patient has a Health Care Directive or Living Will:  YES in chart    1. YES - Do you have a history of heart attack, stroke, stent, bypass or surgery on an artery in the head, neck, heart or legs?  2. NO - Do you ever have any pain or discomfort in your chest?  3. NO - Do you have a history of  Heart Failure?  4. NO - Are you troubled by shortness of breath when: walking on the level, up a slight hill or at night?  5. NO - Do you currently have a cold, bronchitis or other respiratory infection?  6. NO - Do you have a cough, shortness of breath or wheezing?  7. NO - Do you sometimes get pains in the calves of your legs when you walk?  8. YES, personal hx - Do you or anyone in your family have previous history of blood clots?  He has antiphospholipid antibody syndrome   9. NO - Do you or does anyone in your family have a serious bleeding problem such as prolonged bleeding following surgeries or cuts?  He is on xarelto for this   10. YES - Have you ever had problems with anemia or been told to take iron pills?  11. NO - Have you had any abnormal blood loss such as black, tarry or bloody stools, or abnormal vaginal bleeding?  12. YES - Have you ever had a blood transfusion?     13. NO - Have you or any of your relatives ever had problems with anesthesia?  14. NO - Do you have sleep apnea, excessive snoring or daytime drowsiness?  15. NO - Do you have any prosthetic heart valves?  16. YES - Do you have prosthetic joints?  Right total knee   17. NO - Is there any chance that you may be pregnant?      HPI:     HPI related to upcoming procedure: This is a pleasant 73-year-old retired  with alcoholism and alcoholic cirrhosis who continues to drink alcohol.  He also has a very complex medical history including a history of antiphospholipid antibody syndrome with venous thromboembolism, lymphoma, prostate cancer, coronary artery disease status post coronary artery bypass grafting, thoracic aortic aneurysm, peripheral vascular disease status post above-the-knee amputation, recent subdural hematoma following a fall status post craniotomy, recent hospitalization for sepsis from a urinary tract infection complicated by obstructing nephrolithiasis.  He is scheduled for a ureteroscopy with laser kidney stone removal for tomorrow.  He was started on a new oral anticoagulant (Xarelto) by his hematologist several weeks ago following his subdural hematoma.  He stopped taking Xarelto in anticipation of his urological procedure on Sunday by his report to me.  He has been unable to establish a follow-up appointment with his cardiologist as per my directions at his last visit with me.  He does not currently have any chest pain or shortness of breath when walking up a flight of stairs.  He denies current swelling, orthopnea, PND.  He does admit to currently drinking up to 21 drinks in a week.  At previous visits, he was noted to have iron deficiency anemia.  He was referred for iron infusions, but has not had that procedure yet.  He tells me that he had a normal colonoscopy in [place] within the last 2 years but I do not have a report.    MEDICAL HISTORY:     Patient Active Problem List     Diagnosis Date Noted     Malabsorption of iron 06/08/2018     Priority: Medium     Benign essential hypertension 04/27/2018     Priority: Medium     S/P craniotomy 04/07/2018     Priority: Medium     Subdural hematoma (H) 04/07/2018     Priority: Medium     Paroxysmal a-fib (H) 11/29/2017     Priority: Medium     Thrombocytopenia -- suspect related to alcohol use 11/29/2017     Priority: Medium     Chronic congestive heart failure, unspecified congestive heart failure type (H) 06/30/2017     Priority: Medium     Prostate cancer -- S/P Radiative Seed implants in 2000 (no problems since) 05/24/2017     Priority: Medium     Antiphospholipid Jina Syn, Hypercoag (DVT, PE) -- has IVC filt and Warfarin 05/24/2017     Priority: Medium     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 (H) 05/24/2017     Priority: Medium     Thoracic aortic aneurysm without rupture (H) 05/24/2017     Priority: Medium     Alcohol abuse 09/24/2014     Priority: Medium     Problem list name updated by automated process. Provider to review       Alcoholic cirrhosis of liver (H) 09/24/2014     Priority: Medium     Chronic kidney disease, stage III (moderate) 09/24/2014     Priority: Medium     Physical deconditioning 09/24/2014     Priority: Medium      Past Medical History:   Diagnosis Date     Alcoholism (H)      Amputated left leg (H)      Anemia      Anticardiolipin antibody syndrome (H)      Antiplatelet or antithrombotic long-term use      Aortic stenosis      Arthritis      Balance problem      Cardiomyopathy (H)      Coagulation disorder (H)     cardiolipinantibody syndrome     Coronary artery disease      Gastro-oesophageal reflux disease      Heart attack 2007     Hiatal hernia      Hypercholesterolemia      Hypertension      Left foot drop      Liver disease     alcoholic liver disease, alcoholic cirrohsosis, portal hypertension     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)     ?When diagnosed, patient not aware of this      Moderate mitral regurgitation      Monoclonal gammopathy      Neuropathy      Noninfectious ileitis     diverticulitis of colon     PE (pulmonary embolism) 2006     Prostate cancer (H) 2000    Treated with radiation (seed implants in 2000) -- no problems since     Renal disease     kidney stones     Syncope      Thoracic aortic aneurysm, without rupture      Thrombocytopenia (H)      Thrombosis of leg      Urethral stricture      Past Surgical History:   Procedure Laterality Date     AMPUTATE LEG BELOW KNEE Left 04/2014    related to gangrene, started as foot ulcer related to neuropathy     AMPUTATE LEG BELOW KNEE Left 12/4/2017    Procedure: AMPUTATE LEG BELOW KNEE;  Exploration of Left Leg Below Knee Amputation Stump with Ligation of Bleeders.;  Surgeon: Leandro Arreola MD;  Location:  OR     APPENDECTOMY       APPLY WOUND VAC Left 12/6/2017    Procedure: APPLY WOUND VAC;;  Surgeon: Saima Howard MD;  Location:  SD     ARTHROPLASTY KNEE Right 9/19/2014    Procedure: ARTHROPLASTY KNEE;  Surgeon: Saima Howard MD;  Location:  OR     CARDIAC SURGERY      CABG     CHOLECYSTECTOMY       COLONOSCOPY       CRANIOTOMY Right 4/7/2018    Procedure: CRANIOTOMY;  Right Craniotomy For Subdural Hematoma;  Surgeon: Jono Issa MD;  Location:  OR     CYSTOSCOPY, DILATE URETHRA, COMBINED N/A 10/12/2016    Procedure: COMBINED CYSTOSCOPY, DILATE URETHRA;  Surgeon: Dimitry Bello MD;  Location:  OR     ENDOSCOPIC STRIPPING VEIN(S)       esophagogastroduodenoscopy       EYE SURGERY      cataracts     GENITOURINARY SURGERY      Prostate Seen Implants     HERNIA REPAIR      Umbilical     INCISION AND DRAINAGE LOWER EXTREMITY, COMBINED Left 12/1/2017    Procedure: COMBINED INCISION AND DRAINAGE LOWER EXTREMITY;  INCISION AND DRAINAGE OF LEFT BELOW KNEE AMPUTATION ABSCESS, WOUND VAC PLACEMENT;  Surgeon: Saima Howard MD;  Location:  OR     IR IVC FILTER PLACEMENT       IRRIGATION AND  DEBRIDEMENT LOWER EXTREMITY, COMBINED Left 12/6/2017    Procedure: COMBINED IRRIGATION AND DEBRIDEMENT LOWER EXTREMITY;  IRRIGATION AND DEBRIDEMENT OF LEFT BELOW KNEE AMPUTATION SITE WITH WOUND VAC REPLACEMENT;  Surgeon: Saima Howard MD;  Location: [facility]     IRRIGATION AND DEBRIDEMENT LOWER EXTREMITY, COMBINED Left 12/8/2017    Procedure: COMBINED IRRIGATION AND DEBRIDEMENT LOWER EXTREMITY;  IRRIGATION AND DEBRIDEMENT OF LEFT BELOW THE KNEE AMPUTATION AND SHORTENING OF THE TIBIA WITH WOUND CLOSURE;  Surgeon: Saima Howard MD;  Location:  OR     ORTHOPEDIC SURGERY      (R) knee scope     ORTHOPEDIC SURGERY      total hip      ORTHOPEDIC SURGERY      elbow surgery     Current Outpatient Prescriptions   Medication Sig Dispense Refill     gabapentin (NEURONTIN) 600 MG tablet Take 1 tablet (600 mg) by mouth 3 times daily 270 tablet 3     ASPIRIN NOT PRESCRIBED (INTENTIONAL) Please choose reason not prescribed, below 0 each 0     carvedilol (COREG) 3.125 MG tablet Take 1 tablet (3.125 mg) by mouth 2 times daily (with meals) 180 tablet 1     fluticasone (FLONASE) 50 MCG/ACT spray Spray 2 sprays into both nostrils daily       folic acid (FOLVITE) 1 MG tablet Take 1 tablet (1 mg) by mouth daily 30 tablet      GABAPENTIN PO Take 600 mg by mouth 3 times daily        oxyCODONE IR (ROXICODONE) 5 MG tablet Take 1 tablet (5 mg) by mouth every 6 hours as needed for other (pain control or improvement in physical function. Hold dose for analgesic side effects.) 6 tablet 0     phenazopyridine (PYRIDIUM) 100 MG tablet Take 1 tablet (100 mg) by mouth 3 times daily as needed for urinary tract discomfort 90 tablet 3     rivaroxaban ANTICOAGULANT (XARELTO) 10 MG TABS tablet Take 1 tablet (10 mg) by mouth daily (with dinner)       thiamine 100 MG tablet Take 1 tablet (100 mg) by mouth daily       torsemide (DEMADEX) 20 MG tablet Take 20 mg by mouth daily         Allergies   Allergen Reactions     Hmg-Coa-R Inhibitors Other (See  "Comments)     Rhabdomyolysis occurred within a couple days     Ciprofloxacin Itching     Severe itching \"like ants were crawling\"       Social History   Substance Use Topics     Smoking status: Never Smoker     Smokeless tobacco: Never Used     Alcohol use Yes      Comment: quit 10/2015; now 5-6 Vodkas/night     History   Drug Use No       REVIEW OF SYSTEMS:   Constitutional, neuro, ENT, endocrine, pulmonary, cardiac, gastrointestinal, genitourinary, musculoskeletal, integument and psychiatric systems are negative, except as otherwise noted.    EXAM:   /72  Pulse 74  Temp 97.7  F (36.5  C) (Oral)  Ht 6' 2\" (1.88 m)  Wt 220 lb (99.8 kg)  SpO2 94%  BMI 28.25 kg/m2    General: This is a well-appearing, comfortable appearing man.  HEENT: There is a healed right scalp incision site.  The bilateral tympanic membranes are normal, nasal exam is normal, the posterior pharynx appears normal.  The mucous membranes are moist.  The neck is supple without adenopathy.  Cardiovascular: The heart has an irregularly irregular rhythm with a normal rate.  There are no carotid bruits.  Pulmonary: Lungs are clear to auscultation bilaterally, breathing is not labored, no dullness to percussion.  Abdomen: Soft, not distended, nontender, bowel sounds present, no masses, no obvious organomegaly.  Extremities: There is a left leg amputation.  The right lower extremity is without edema and well perfused.  Neurological: Alert and oriented to person place and time, cranial nerves II to XII appear grossly intact, there is symmetric strength, he walks with a cane.  Mental status: Normal mood and affect, mildly impaired insight and judgment, well-groomed, normal speech.  DIAGNOSTICS:   EKG: His most recent EKG was dated May 21 and showed atrial fibrillation with a ventricular rate of 88 bpm    Recent Labs   Lab Test  05/27/18   0608  05/25/18   0707  05/24/18   0700   05/18/18   1641   04/11/18   0807   HGB  8.9*  8.9*  9.0*   < >  " 10.6*   < >  10.5*   PLT  106*  76*  68*   < >  168   < >  156   INR   --    --   1.68*   --    --    --   1.39*   NA  137  139  138   < >  140   < >  140   POTASSIUM  4.7  4.1  4.0   < >  4.3   < >  2.9*   CR  1.23  1.40*  1.57*   < >  1.00   < >  0.88   A1C   --    --    --    --   4.6   --    --     < > = values in this interval not displayed.        IMPRESSION:   Reason for surgery/procedure: Obstructing nephrolithiasis with recent hospitalization for sepsis due to urinary tract infection  Diagnosis/reason for consult: Preoperative evaluation    The proposed surgical procedure is considered LOW risk.    REVISED CARDIAC RISK INDEX  The patient has the following serious cardiovascular risks for perioperative complications such as (MI, PE, VFib and 3  AV Block):  Coronary Artery Disease (MI, positive stress test, angina, Qs on EKG)  INTERPRETATION: 1 risks: Class II (low risk - 0.9% complication rate)    The patient has the following additional risks for perioperative complications:  Alcohol abuse with risk of withdrawal      ICD-10-CM    1. Coronary artery disease involving native coronary artery of native heart with unstable angina pectoris (H) I25.110 CARDIOLOGY EVAL ADULT REFERRAL   2. Preop general physical exam Z01.818    3. Nephrolithiasis N20.0    4. Thoracic aortic aneurysm without rupture (H) I71.2 Echocardiogram Complete   5. Phantom limb pain (H) G54.6 gabapentin (NEURONTIN) 600 MG tablet   6. Iron deficiency anemia, unspecified iron deficiency anemia type D50.9    7. Alcoholic cirrhosis of liver without ascites (H) K70.30    8. Alcohol abuse F10.10    9. Antiphospholipid Jina Syn, Hypercoag (DVT, PE) -- has IVC filt and Warfarin D68.61    10. 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 (H) C83.39    11. Chronic congestive heart failure, unspecified congestive heart failure type (H) I50.9    12. Paroxysmal a-fib (H) I48.0    13. Subdural hematoma (H) I62.00    14. Benign essential hypertension I10       I think that the risk-benefit ratio favors proceeding with nephrolithiasis procedure tomorrow to avoid future urinary tract infections as serious as his most recent that resulted in sepsis.  However, I still think that he should pursue follow-up for his cardiac conditions including his coronary artery disease and his thoracic aortic aneurysm.  This should certainly be done before elective hip replacement surgery which he hopes to have done in August.  Since he has having trouble scheduling this at the [facility], I did offer him a referral to the [facility] physician heart clinic across the street from this clinic.  I did order an echocardiogram so that the results of that test could be discussed with a cardiologist at the [facility] if he decides to follow-up with a physician there.  I would recommend that he see a cardiologist prior to undergoing elective orthopedic surgery.  With respect to his anemia, I personally gave him the telephone number that he could use to schedule iron infusions.  He will need to improve his anemia before he can be cleared for orthopedic surgery later this summer.  His urologic procedure is of much lower risk and despite his moderate anemia, I think he can proceed with that procedure given the risk benefit ratio outlined above.  He has already stopped his anticoagulant in anticipation of his urologic procedure.  Because of his history of antiphospholipid antibody syndrome, he should restart his Xarelto as soon as appropriate following his urologic procedure.  He was again counseled on abstaining from alcohol.  His volume status appears to be well compensated today.  He seems to have recovered well from the subdural hematoma.  His blood pressure is okay to proceed with his urologic procedure.  He did ask for a refill of gabapentin that he uses for phantom limb pain since his amputation.    RECOMMENDATIONS:      --Consult hospital rounder / IM to assist post-op medical management    --Patient is to take all scheduled medications on the day of surgery EXCEPT for modifications listed below.    Anticoagulant or Antiplatelet Medication Use  XARELTO: Bleeding risk is at least moderate for this procedure and Dabigatran (Xarelto) should be stopped at least 24 hours prior to procedure (he stopped it on Sunday), and I told him that it should be restarted as soon as appropriate following the urological procedure.        APPROVAL GIVEN to proceed with proposed procedure, without further diagnostic evaluation       Signed Electronically by: Main Hardy MD    Copy of this evaluation report is provided to requesting physician.    [facility] Preop Guidelines    Revised Cardiac Risk Index

## 2018-06-27 ENCOUNTER — OFFICE VISIT (OUTPATIENT)
Dept: FAMILY MEDICINE | Facility: CLINIC | Age: 73
End: 2018-06-27
Payer: MEDICARE

## 2018-06-27 VITALS
SYSTOLIC BLOOD PRESSURE: 142 MMHG | DIASTOLIC BLOOD PRESSURE: 72 MMHG | BODY MASS INDEX: 28.23 KG/M2 | HEART RATE: 74 BPM | OXYGEN SATURATION: 94 % | WEIGHT: 220 LBS | HEIGHT: 74 IN | TEMPERATURE: 97.7 F

## 2018-06-27 DIAGNOSIS — I71.20 THORACIC AORTIC ANEURYSM WITHOUT RUPTURE (H): ICD-10-CM

## 2018-06-27 DIAGNOSIS — Z01.818 PREOP GENERAL PHYSICAL EXAM: ICD-10-CM

## 2018-06-27 DIAGNOSIS — I48.0 PAROXYSMAL A-FIB (H): ICD-10-CM

## 2018-06-27 DIAGNOSIS — K70.30 ALCOHOLIC CIRRHOSIS OF LIVER WITHOUT ASCITES (H): ICD-10-CM

## 2018-06-27 DIAGNOSIS — N20.0 NEPHROLITHIASIS: ICD-10-CM

## 2018-06-27 DIAGNOSIS — G54.6 PHANTOM LIMB PAIN (H): ICD-10-CM

## 2018-06-27 DIAGNOSIS — D68.61 ANTIPHOSPHOLIPID ANTIBODY SYNDROME (H): ICD-10-CM

## 2018-06-27 DIAGNOSIS — I50.9 CHRONIC CONGESTIVE HEART FAILURE, UNSPECIFIED CONGESTIVE HEART FAILURE TYPE: ICD-10-CM

## 2018-06-27 DIAGNOSIS — S06.5XAA SUBDURAL HEMATOMA (H): ICD-10-CM

## 2018-06-27 DIAGNOSIS — I10 BENIGN ESSENTIAL HYPERTENSION: ICD-10-CM

## 2018-06-27 DIAGNOSIS — C83.398 DIFFUSE LARGE B-CELL LYMPHOMA OF EXTRANODAL SITE: ICD-10-CM

## 2018-06-27 DIAGNOSIS — D50.9 IRON DEFICIENCY ANEMIA, UNSPECIFIED IRON DEFICIENCY ANEMIA TYPE: ICD-10-CM

## 2018-06-27 DIAGNOSIS — F10.10 ALCOHOL ABUSE: ICD-10-CM

## 2018-06-27 DIAGNOSIS — I25.110 CORONARY ARTERY DISEASE INVOLVING NATIVE CORONARY ARTERY OF NATIVE HEART WITH UNSTABLE ANGINA PECTORIS (H): Primary | ICD-10-CM

## 2018-06-27 PROBLEM — L89.92 PRESSURE ULCER, STAGE 2 (H): Status: RESOLVED | Noted: 2017-12-14 | Resolved: 2018-06-27

## 2018-06-27 PROBLEM — N39.0 UTI (URINARY TRACT INFECTION): Status: RESOLVED | Noted: 2018-05-21 | Resolved: 2018-06-27

## 2018-06-27 PROBLEM — R41.0 CONFUSION: Status: RESOLVED | Noted: 2017-12-17 | Resolved: 2018-06-27

## 2018-06-27 PROBLEM — I25.10 CAD (CORONARY ARTERY DISEASE): Status: RESOLVED | Noted: 2017-05-24 | Resolved: 2018-06-27

## 2018-06-27 PROBLEM — L03.119 CELLULITIS AND ABSCESS OF LEG: Status: RESOLVED | Noted: 2017-11-29 | Resolved: 2018-06-27

## 2018-06-27 PROBLEM — B37.2 YEAST INFECTION OF THE SKIN: Status: RESOLVED | Noted: 2017-12-14 | Resolved: 2018-06-27
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
## 2018-06-27 PROBLEM — F10.21 ALCOHOL DEPENDENCE IN REMISSION (H): Status: RESOLVED | Noted: 2018-05-18 | Resolved: 2018-06-27

## 2018-06-27 PROBLEM — E87.5 HYPERKALEMIA: Status: RESOLVED | Noted: 2017-12-17 | Resolved: 2018-06-27

## 2018-06-27 PROBLEM — L02.416 ABSCESS OF LEG, LEFT: Status: RESOLVED | Noted: 2017-11-29 | Resolved: 2018-06-27

## 2018-06-27 PROBLEM — T87.44 INFECTION OF LEFT BELOW KNEE AMPUTATION (H): Status: RESOLVED | Noted: 2017-12-04 | Resolved: 2018-06-27

## 2018-06-27 PROBLEM — L02.91 ABSCESS: Status: RESOLVED | Noted: 2017-12-01 | Resolved: 2018-06-27

## 2018-06-27 PROBLEM — I89.0 LYMPHEDEMA: Status: RESOLVED | Noted: 2017-12-12 | Resolved: 2018-06-27

## 2018-06-27 PROBLEM — L02.419 CELLULITIS AND ABSCESS OF LEG: Status: RESOLVED | Noted: 2017-11-29 | Resolved: 2018-06-27

## 2018-06-27 PROBLEM — D62 ANEMIA DUE TO BLOOD LOSS, ACUTE: Status: RESOLVED | Noted: 2017-12-12 | Resolved: 2018-06-27

## 2018-06-27 PROCEDURE — 99215 OFFICE O/P EST HI 40 MIN: CPT | Performed by: INTERNAL MEDICINE

## 2018-06-27 RX ORDER — GABAPENTIN 600 MG/1
600 TABLET ORAL 3 TIMES DAILY
Qty: 270 TABLET | Refills: 3 | Status: SHIPPED | OUTPATIENT
Start: 2018-06-27 | End: 2018-12-06

## 2018-06-27 NOTE — MR AVS SNAPSHOT
After Visit Summary   6/27/2018    Antonio Ramirez    MRN: [id number]           Patient Information     Date Of Birth          1945        Visit Information        Provider Department      6/27/2018 1:30 PM Main Hardy MD [employer]        Today's Diagnoses     Coronary artery disease involving native coronary artery of native heart with unstable angina pectoris (H)    -  1    Preop general physical exam        Nephrolithiasis        Thoracic aortic aneurysm without rupture (H)        Phantom limb pain (H)        Iron deficiency anemia, unspecified iron deficiency anemia type        Alcoholic cirrhosis of liver without ascites (H)        Alcohol abuse        Antiphospholipid Jina Syn, Hypercoag (DVT, PE) -- has IVC filt and Warfarin        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 (H)        Chronic congestive heart failure, unspecified congestive heart failure type (H)        Paroxysmal a-fib (H)        Subdural hematoma (H)        Benign essential hypertension          Care Instructions      Before Your Surgery      Call your surgeon if there is any change in your health. This includes signs of a cold or flu (such as a sore throat, runny nose, cough, rash or fever).    Do not smoke, drink alcohol or take over the counter medicine (unless your surgeon or primary care doctor tells you to) for the 24 hours before and after surgery.    If you take prescribed drugs: Follow your doctor s orders about which medicines to take and which to stop until after surgery.    Eating and drinking prior to surgery: follow the instructions from your surgeon    Take a shower or bath the night before surgery. Use the soap your surgeon gave you to gently clean your skin. If you do not have soap from your surgeon, use your regular soap. Do not shave or scrub the surgery site.  Wear clean pajamas and have clean sheets on your bed.           Follow-ups after your visit        Additional Services      CARDIOLOGY EVAL ADULT REFERRAL       Preferred location:  [facility] [phone]   [email or web address]    Please be aware that coverage of these services is subject to the terms and limitations of your health insurance plan.  Call member services at your health plan with any benefit or coverage questions.      Please bring the following to your appointment:  Any x-rays, CTs or MRIs which have been performed. Contact the facility where they were done to arrange for  prior to your scheduled appointment.    List of current medications  This referral request   Any documents/labs given to you for this referral                  Your next 10 appointments already scheduled     Jun 28, 2018   Procedure with Jose Hunter MD   [facility] PeriOP Services (--)    [address] [place] [address], [address]  [facility] [address]   [phone]            Jul 11, 2018 10:45 AM CDT   Neuro Treatment with Eileen Gonzales PT   [facility] ([facility])    [address] [place] [address]  [address]  [facility] [address]   [phone]            Jul 11, 2018  3:00 PM CDT   Ech Complete with [id number]   [facility] CV Echocardiography (Cardiovascular Imaging at [facility])    [address] [facility]  [address] [place] [id number] [phone]           1. Please bring or wear a comfortable two-piece outfit. 2. You may eat, drink and take your normal medicines. 3. For any questions that cannot be answered, please contact the ordering physician            Jul 19, 2018 11:00 AM CDT   Neuro Treatment with Silvia Goel, PT   [facility] ([facility])    [address] [place] [address]  [address]  [facility] [address]   [phone]            Jul 26, 2018 10:45 AM CDT   Neuro Treatment with Silvia Goel, PT   [facility] ([facility])    [address] [place] [address]  [address]  [place] [address]   [phone]            Aug 31, 2018   Procedure with Saima Howard MD   [facility] PeriOP Services (--)    [address] [facility], [address]  [place] [address]   [phone]            Oct 02, 2018 11:15 AM CDT   New Visit with Elena Downs MD   [facility] ([facility])    [address] [facility] [address]  [place] [address] [phone] OPT 2              Future tests that were ordered for you today     Open Future Orders        Priority Expected Expires Ordered    Echocardiogram Complete Routine  6/27/2019 6/27/2018            Who to contact     If you have questions or need follow up information about today's clinic visit or your schedule please contact [facility] directly at [phone].  Normal or non-critical lab and imaging results will be communicated to you by MyChart, letter or phone within 4 business days after the clinic has received the results. If you do not hear from us within 7 days, please contact the clinic through bright boxhart or phone. If you have a critical or abnormal lab result, we will notify you by phone as soon as possible.  Submit refill requests through [facility] or call your pharmacy and they will forward the refill request to us. Please allow 3 business days for your refill to be completed.          Additional Information About Your Visit        MyChart Information     [facility] gives you secure access to your electronic health record. If you see a primary care provider, you can also send messages to your care team and make appointments. If you have questions, please call your primary care clinic.  If you do not have a primary care provider, please call [phone] and they will assist you.        Care EveryWhere ID     This is your Care EveryWhere ID. This could be used by other organizations to access your [facility] medical records  [id number]        Your Vitals Were     Pulse Temperature  "Height Pulse Oximetry BMI (Body Mass Index)       74 97.7  F (36.5  C) (Oral) 6' 2\" (1.88 m) 94% 28.25 kg/m2        Blood Pressure from Last 3 Encounters:   06/27/18 142/72   06/07/18 132/73   05/30/18 126/56    Weight from Last 3 Encounters:   06/27/18 220 lb (99.8 kg)   06/07/18 220 lb (99.8 kg)   05/30/18 226 lb 3.2 oz (102.6 kg)              We Performed the Following     CARDIOLOGY EVAL ADULT REFERRAL          Today's Medication Changes          These changes are accurate as of 6/27/18  5:34 PM.  If you have any questions, ask your nurse or doctor.               These medicines have changed or have updated prescriptions.        Dose/Directions    * GABAPENTIN PO   Indication:  Neurogenic Pain   This may have changed:  Another medication with the same name was added. Make sure you understand how and when to take each.   Changed by:  Main Hardy MD        Dose:  600 mg   Take 600 mg by mouth 3 times daily   Refills:  0       * gabapentin 600 MG tablet   Commonly known as:  NEURONTIN   This may have changed:  You were already taking a medication with the same name, and this prescription was added. Make sure you understand how and when to take each.   Used for:  Phantom limb pain (H)   Changed by:  Main Hardy MD        Dose:  600 mg   Take 1 tablet (600 mg) by mouth 3 times daily   Quantity:  270 tablet   Refills:  3       * Notice:  This list has 2 medication(s) that are the same as other medications prescribed for you. Read the directions carefully, and ask your doctor or other care provider to review them with you.         Where to get your medicines      These medications were sent to [id number] - [place] - [address] AT [address] [place] [address] & [facility]  [address] [place] [address] [place] [address]    Hours:  24-hours Phone:  [phone]     gabapentin 600 MG tablet                Primary Care Provider Office Phone # Fax #    Main Hardy [phone] [address] [facility] [address]  [facility] [address]        Equal Access to Services     ANTHONY DORANTES : Hadii aad ku hadgisellelove Cabrera, waraymondda christiane, qaybta rodneymaigor myers. So [facility] [phone].    ATENCIÓN: Si habla español, tiene a shrestha disposición servicios gratuitos de asistencia lingüística. Llame al [phone].    We comply with applicable federal civil rights laws and [place] laws. We do not discriminate on the basis of race, color, national origin, age, disability, sex, sexual orientation, or gender identity.            Thank you!     Thank you for choosing [facility]  for your care. Our goal is always to provide you with excellent care. Hearing back from our patients is one way we can continue to improve our services. Please take a few minutes to complete the written survey that you may receive in the mail after your visit with us. Thank you!             Your Updated Medication List - Protect others around you: Learn how to safely use, store and throw away your medicines at www.disposemymeds.org.          This list is accurate as of 6/27/18  5:34 PM.  Always use your most recent med list.                   Brand Name Dispense Instructions for use Diagnosis    ASPIRIN NOT PRESCRIBED    INTENTIONAL    0 each    Please choose reason not prescribed, below    Antiphospholipid antibody syndrome (H)       carvedilol 3.125 MG tablet    COREG    180 tablet    Take 1 tablet (3.125 mg) by mouth 2 times daily (with meals)        fluticasone 50 MCG/ACT spray    FLONASE     Spray 2 sprays into both nostrils daily        folic acid 1 MG tablet    FOLVITE    30 tablet    Take 1 tablet (1 mg) by mouth daily    Alcohol dependence in remission (H)       * GABAPENTIN PO      Take 600 mg by mouth 3 times daily        * gabapentin 600 MG tablet    NEURONTIN    270 tablet    Take 1 tablet (600 mg) by mouth 3 times daily    Phantom limb pain (H)       oxyCODONE IR 5 MG tablet    ROXICODONE    6  tablet    Take 1 tablet (5 mg) by mouth every 6 hours as needed for other (pain control or improvement in physical function. Hold dose for analgesic side effects.)    Urinary obstruction       phenazopyridine 100 MG tablet    PYRIDIUM    90 tablet    Take 1 tablet (100 mg) by mouth 3 times daily as needed for urinary tract discomfort    Urinary tract infection without hematuria, site unspecified       thiamine 100 MG tablet      Take 1 tablet (100 mg) by mouth daily    Alcohol dependence in remission (H)       torsemide 20 MG tablet    DEMADEX     Take 20 mg by mouth daily        XARELTO 10 MG Tabs tablet   Generic drug:  rivaroxaban ANTICOAGULANT      Take 1 tablet (10 mg) by mouth daily (with dinner)        * Notice:  This list has 2 medication(s) that are the same as other medications prescribed for you. Read the directions carefully, and ask your doctor or other care provider to review them with you.

## 2018-06-27 NOTE — NURSING NOTE
"Chief Complaint   Patient presents with     Pre-Op Exam     /72  Pulse 74  Temp 97.7  F (36.5  C) (Oral)  Ht 6' 2\" (1.88 m)  Wt 220 lb (99.8 kg)  SpO2 94%  BMI 28.25 kg/m2 Estimated body mass index is 28.25 kg/(m^2) as calculated from the following:    Height as of this encounter: 6' 2\" (1.88 m).    Weight as of this encounter: 220 lb (99.8 kg).          Grisel Adhikari CMA  "

## 2018-06-28 ENCOUNTER — APPOINTMENT (OUTPATIENT)
Dept: GENERAL RADIOLOGY | Facility: CLINIC | Age: 73
End: 2018-06-28
Attending: UROLOGY
Payer: MEDICARE

## 2018-06-28 ENCOUNTER — HOSPITAL ENCOUNTER (OUTPATIENT)
Facility: CLINIC | Age: 73
Discharge: HOME | End: 2018-06-28
Attending: UROLOGY | Admitting: UROLOGY
Payer: MEDICARE

## 2018-06-28 ENCOUNTER — ANESTHESIA (OUTPATIENT)
Dept: SURGERY | Facility: CLINIC | Age: 73
End: 2018-06-28
Payer: MEDICARE

## 2018-06-28 ENCOUNTER — PATIENT OUTREACH (OUTPATIENT)
Dept: CARE COORDINATION | Facility: CLINIC | Age: 73
End: 2018-06-28

## 2018-06-28 ENCOUNTER — ANESTHESIA EVENT (OUTPATIENT)
Dept: SURGERY | Facility: CLINIC | Age: 73
End: 2018-06-28
Payer: MEDICARE

## 2018-06-28 ENCOUNTER — TELEPHONE (OUTPATIENT)
Dept: INFUSION THERAPY | Facility: CLINIC | Age: 73
End: 2018-06-28

## 2018-06-28 VITALS
TEMPERATURE: 98 F | WEIGHT: 212.4 LBS | HEIGHT: 73 IN | DIASTOLIC BLOOD PRESSURE: 85 MMHG | OXYGEN SATURATION: 93 % | SYSTOLIC BLOOD PRESSURE: 138 MMHG | RESPIRATION RATE: 16 BRPM | BODY MASS INDEX: 28.15 KG/M2

## 2018-06-28 DIAGNOSIS — N20.1 CALCULUS OF BOTH URETERS: Primary | ICD-10-CM

## 2018-06-28 LAB
ABO + RH BLD: NORMAL
ABO + RH BLD: NORMAL
BLD GP AB SCN SERPL QL: NORMAL
BLOOD BANK CMNT PATIENT-IMP: NORMAL
SPECIMEN EXP DATE BLD: NORMAL

## 2018-06-28 PROCEDURE — 25000125 ZZHC RX 250: Mod: GY | Performed by: UROLOGY

## 2018-06-28 PROCEDURE — 40000170 ZZH STATISTIC PRE-PROCEDURE ASSESSMENT II: Performed by: UROLOGY

## 2018-06-28 PROCEDURE — 36415 COLL VENOUS BLD VENIPUNCTURE: CPT | Performed by: ANESTHESIOLOGY

## 2018-06-28 PROCEDURE — 37000008 ZZH ANESTHESIA TECHNICAL FEE, 1ST 30 MIN: Performed by: UROLOGY

## 2018-06-28 PROCEDURE — C2617 STENT, NON-COR, TEM W/O DEL: HCPCS | Performed by: UROLOGY

## 2018-06-28 PROCEDURE — 88300 SURGICAL PATH GROSS: CPT | Performed by: UROLOGY

## 2018-06-28 PROCEDURE — 71000013 ZZH RECOVERY PHASE 1 LEVEL 1 EA ADDTL HR: Performed by: UROLOGY

## 2018-06-28 PROCEDURE — 86901 BLOOD TYPING SEROLOGIC RH(D): CPT | Performed by: ANESTHESIOLOGY

## 2018-06-28 PROCEDURE — 71000012 ZZH RECOVERY PHASE 1 LEVEL 1 FIRST HR: Performed by: UROLOGY

## 2018-06-28 PROCEDURE — 88300 SURGICAL PATH GROSS: CPT | Mod: 26 | Performed by: UROLOGY

## 2018-06-28 PROCEDURE — 27210794 ZZH OR GENERAL SUPPLY STERILE: Performed by: UROLOGY

## 2018-06-28 PROCEDURE — 25000128 H RX IP 250 OP 636: Performed by: NURSE ANESTHETIST, CERTIFIED REGISTERED

## 2018-06-28 PROCEDURE — 40000277 XR SURGERY CARM FLUORO LESS THAN 5 MIN W STILLS

## 2018-06-28 PROCEDURE — 25000128 H RX IP 250 OP 636: Performed by: ANESTHESIOLOGY

## 2018-06-28 PROCEDURE — 25000566 ZZH SEVOFLURANE, EA 15 MIN: Performed by: UROLOGY

## 2018-06-28 PROCEDURE — 36000058 ZZH SURGERY LEVEL 3 EA 15 ADDTL MIN: Performed by: UROLOGY

## 2018-06-28 PROCEDURE — 82365 CALCULUS SPECTROSCOPY: CPT | Performed by: UROLOGY

## 2018-06-28 PROCEDURE — 86900 BLOOD TYPING SEROLOGIC ABO: CPT | Performed by: ANESTHESIOLOGY

## 2018-06-28 PROCEDURE — 25000128 H RX IP 250 OP 636: Performed by: UROLOGY

## 2018-06-28 PROCEDURE — 86850 RBC ANTIBODY SCREEN: CPT | Performed by: ANESTHESIOLOGY

## 2018-06-28 PROCEDURE — C1769 GUIDE WIRE: HCPCS | Performed by: UROLOGY

## 2018-06-28 PROCEDURE — 71000027 ZZH RECOVERY PHASE 2 EACH 15 MINS: Performed by: UROLOGY

## 2018-06-28 PROCEDURE — 37000009 ZZH ANESTHESIA TECHNICAL FEE, EACH ADDTL 15 MIN: Performed by: UROLOGY

## 2018-06-28 PROCEDURE — 25000125 ZZHC RX 250: Performed by: NURSE ANESTHETIST, CERTIFIED REGISTERED

## 2018-06-28 PROCEDURE — 27210995 ZZH RX 272: Performed by: UROLOGY

## 2018-06-28 PROCEDURE — 36000056 ZZH SURGERY LEVEL 3 1ST 30 MIN: Performed by: UROLOGY

## 2018-06-28 PROCEDURE — C1894 INTRO/SHEATH, NON-LASER: HCPCS | Performed by: UROLOGY

## 2018-06-28 PROCEDURE — A9270 NON-COVERED ITEM OR SERVICE: HCPCS | Mod: GY | Performed by: UROLOGY

## 2018-06-28 PROCEDURE — 25800025 ZZH RX 258: Performed by: UROLOGY

## 2018-06-28 PROCEDURE — C1758 CATHETER, URETERAL: HCPCS | Performed by: UROLOGY

## 2018-06-28 DEVICE — STENT URETERAL CONTOUR SOFT PERCUFLEX 6FRX28CM
Type: IMPLANTABLE DEVICE | Site: URETER | Status: NON-FUNCTIONAL
Removed: 2018-07-24

## 2018-06-28 RX ORDER — PROPOFOL 10 MG/ML
INJECTION, EMULSION INTRAVENOUS PRN
Status: DISCONTINUED | OUTPATIENT
Start: 2018-06-28 | End: 2018-06-28

## 2018-06-28 RX ORDER — FENTANYL CITRATE 50 UG/ML
50-100 INJECTION, SOLUTION INTRAMUSCULAR; INTRAVENOUS
Status: DISCONTINUED | OUTPATIENT
Start: 2018-06-28 | End: 2018-06-28 | Stop reason: HOSPADM

## 2018-06-28 RX ORDER — NEOSTIGMINE METHYLSULFATE 1 MG/ML
VIAL (ML) INJECTION PRN
Status: DISCONTINUED | OUTPATIENT
Start: 2018-06-28 | End: 2018-06-28

## 2018-06-28 RX ORDER — MEPERIDINE HYDROCHLORIDE 25 MG/ML
12.5 INJECTION INTRAMUSCULAR; INTRAVENOUS; SUBCUTANEOUS
Status: DISCONTINUED | OUTPATIENT
Start: 2018-06-28 | End: 2018-06-28 | Stop reason: HOSPADM

## 2018-06-28 RX ORDER — ONDANSETRON 4 MG/1
4 TABLET, ORALLY DISINTEGRATING ORAL EVERY 30 MIN PRN
Status: DISCONTINUED | OUTPATIENT
Start: 2018-06-28 | End: 2018-06-28 | Stop reason: HOSPADM

## 2018-06-28 RX ORDER — FENTANYL CITRATE 50 UG/ML
25-50 INJECTION, SOLUTION INTRAMUSCULAR; INTRAVENOUS
Status: DISCONTINUED | OUTPATIENT
Start: 2018-06-28 | End: 2018-06-28 | Stop reason: HOSPADM

## 2018-06-28 RX ORDER — HYDROCODONE BITARTRATE AND ACETAMINOPHEN 5; 325 MG/1; MG/1
1 TABLET ORAL ONCE
Status: DISCONTINUED | OUTPATIENT
Start: 2018-06-28 | End: 2018-06-28 | Stop reason: HOSPADM

## 2018-06-28 RX ORDER — HYDROCODONE BITARTRATE AND ACETAMINOPHEN 5; 325 MG/1; MG/1
1-2 TABLET ORAL EVERY 4 HOURS PRN
Qty: 20 TABLET | Refills: 0 | Status: ON HOLD | OUTPATIENT
Start: 2018-06-28 | End: 2018-07-25

## 2018-06-28 RX ORDER — FENTANYL CITRATE 50 UG/ML
INJECTION, SOLUTION INTRAMUSCULAR; INTRAVENOUS PRN
Status: DISCONTINUED | OUTPATIENT
Start: 2018-06-28 | End: 2018-06-28

## 2018-06-28 RX ORDER — EPHEDRINE SULFATE 50 MG/ML
INJECTION, SOLUTION INTRAMUSCULAR; INTRAVENOUS; SUBCUTANEOUS PRN
Status: DISCONTINUED | OUTPATIENT
Start: 2018-06-28 | End: 2018-06-28

## 2018-06-28 RX ORDER — GLYCOPYRROLATE 0.2 MG/ML
INJECTION, SOLUTION INTRAMUSCULAR; INTRAVENOUS PRN
Status: DISCONTINUED | OUTPATIENT
Start: 2018-06-28 | End: 2018-06-28

## 2018-06-28 RX ORDER — LIDOCAINE HYDROCHLORIDE 20 MG/ML
INJECTION, SOLUTION INFILTRATION; PERINEURAL PRN
Status: DISCONTINUED | OUTPATIENT
Start: 2018-06-28 | End: 2018-06-28

## 2018-06-28 RX ORDER — ONDANSETRON 2 MG/ML
4 INJECTION INTRAMUSCULAR; INTRAVENOUS EVERY 30 MIN PRN
Status: DISCONTINUED | OUTPATIENT
Start: 2018-06-28 | End: 2018-06-28 | Stop reason: HOSPADM

## 2018-06-28 RX ORDER — HYDROMORPHONE HYDROCHLORIDE 1 MG/ML
.3-.5 INJECTION, SOLUTION INTRAMUSCULAR; INTRAVENOUS; SUBCUTANEOUS EVERY 10 MIN PRN
Status: DISCONTINUED | OUTPATIENT
Start: 2018-06-28 | End: 2018-06-28 | Stop reason: HOSPADM

## 2018-06-28 RX ORDER — PROPOFOL 10 MG/ML
INJECTION, EMULSION INTRAVENOUS CONTINUOUS PRN
Status: DISCONTINUED | OUTPATIENT
Start: 2018-06-28 | End: 2018-06-28

## 2018-06-28 RX ORDER — DEXAMETHASONE SODIUM PHOSPHATE 4 MG/ML
INJECTION, SOLUTION INTRA-ARTICULAR; INTRALESIONAL; INTRAMUSCULAR; INTRAVENOUS; SOFT TISSUE PRN
Status: DISCONTINUED | OUTPATIENT
Start: 2018-06-28 | End: 2018-06-28

## 2018-06-28 RX ORDER — NALOXONE HYDROCHLORIDE 0.4 MG/ML
.1-.4 INJECTION, SOLUTION INTRAMUSCULAR; INTRAVENOUS; SUBCUTANEOUS
Status: DISCONTINUED | OUTPATIENT
Start: 2018-06-28 | End: 2018-06-28 | Stop reason: HOSPADM

## 2018-06-28 RX ORDER — CEFAZOLIN SODIUM 2 G/100ML
2 INJECTION, SOLUTION INTRAVENOUS
Status: COMPLETED | OUTPATIENT
Start: 2018-06-28 | End: 2018-06-28

## 2018-06-28 RX ORDER — SODIUM CHLORIDE, SODIUM LACTATE, POTASSIUM CHLORIDE, CALCIUM CHLORIDE 600; 310; 30; 20 MG/100ML; MG/100ML; MG/100ML; MG/100ML
INJECTION, SOLUTION INTRAVENOUS CONTINUOUS
Status: DISCONTINUED | OUTPATIENT
Start: 2018-06-28 | End: 2018-06-28 | Stop reason: HOSPADM

## 2018-06-28 RX ORDER — ONDANSETRON 2 MG/ML
INJECTION INTRAMUSCULAR; INTRAVENOUS PRN
Status: DISCONTINUED | OUTPATIENT
Start: 2018-06-28 | End: 2018-06-28

## 2018-06-28 RX ORDER — ETOMIDATE 2 MG/ML
INJECTION INTRAVENOUS PRN
Status: DISCONTINUED | OUTPATIENT
Start: 2018-06-28 | End: 2018-06-28

## 2018-06-28 RX ORDER — SODIUM CHLORIDE, SODIUM LACTATE, POTASSIUM CHLORIDE, CALCIUM CHLORIDE 600; 310; 30; 20 MG/100ML; MG/100ML; MG/100ML; MG/100ML
INJECTION, SOLUTION INTRAVENOUS CONTINUOUS PRN
Status: DISCONTINUED | OUTPATIENT
Start: 2018-06-28 | End: 2018-06-28

## 2018-06-28 RX ADMIN — CEFAZOLIN SODIUM 2 G: 2 INJECTION, SOLUTION INTRAVENOUS at 10:04

## 2018-06-28 RX ADMIN — PROPOFOL 20 MCG/KG/MIN: 10 INJECTION, EMULSION INTRAVENOUS at 10:50

## 2018-06-28 RX ADMIN — FENTANYL CITRATE 50 MCG: 50 INJECTION, SOLUTION INTRAMUSCULAR; INTRAVENOUS at 10:05

## 2018-06-28 RX ADMIN — ETOMIDATE 10 MG: 2 INJECTION, SOLUTION INTRAVENOUS at 09:57

## 2018-06-28 RX ADMIN — Medication 5 MG: at 10:25

## 2018-06-28 RX ADMIN — PHENYLEPHRINE HYDROCHLORIDE 50 MCG: 10 INJECTION, SOLUTION INTRAMUSCULAR; INTRAVENOUS; SUBCUTANEOUS at 10:59

## 2018-06-28 RX ADMIN — GLYCOPYRROLATE 0.2 MG: 0.2 INJECTION, SOLUTION INTRAMUSCULAR; INTRAVENOUS at 10:25

## 2018-06-28 RX ADMIN — ROCURONIUM BROMIDE 40 MG: 10 INJECTION INTRAVENOUS at 09:57

## 2018-06-28 RX ADMIN — Medication 5 MG: at 10:23

## 2018-06-28 RX ADMIN — SODIUM CHLORIDE, POTASSIUM CHLORIDE, SODIUM LACTATE AND CALCIUM CHLORIDE: 600; 310; 30; 20 INJECTION, SOLUTION INTRAVENOUS at 09:56

## 2018-06-28 RX ADMIN — PHENYLEPHRINE HYDROCHLORIDE 50 MCG: 10 INJECTION, SOLUTION INTRAMUSCULAR; INTRAVENOUS; SUBCUTANEOUS at 12:18

## 2018-06-28 RX ADMIN — PHENYLEPHRINE HYDROCHLORIDE 50 MCG: 10 INJECTION, SOLUTION INTRAMUSCULAR; INTRAVENOUS; SUBCUTANEOUS at 12:20

## 2018-06-28 RX ADMIN — FENTANYL CITRATE 25 MCG: 50 INJECTION, SOLUTION INTRAMUSCULAR; INTRAVENOUS at 13:11

## 2018-06-28 RX ADMIN — Medication 5 MG: at 10:18

## 2018-06-28 RX ADMIN — DEXAMETHASONE SODIUM PHOSPHATE 4 MG: 4 INJECTION, SOLUTION INTRA-ARTICULAR; INTRALESIONAL; INTRAMUSCULAR; INTRAVENOUS; SOFT TISSUE at 10:04

## 2018-06-28 RX ADMIN — PROPOFOL 100 MG: 10 INJECTION, EMULSION INTRAVENOUS at 09:57

## 2018-06-28 RX ADMIN — PHENYLEPHRINE HYDROCHLORIDE 50 MCG: 10 INJECTION, SOLUTION INTRAMUSCULAR; INTRAVENOUS; SUBCUTANEOUS at 11:17

## 2018-06-28 RX ADMIN — NEOSTIGMINE METHYLSULFATE 4 MG: 1 INJECTION, SOLUTION INTRAVENOUS at 12:32

## 2018-06-28 RX ADMIN — CEFAZOLIN SODIUM 1 G: 2 INJECTION, SOLUTION INTRAVENOUS at 12:04

## 2018-06-28 RX ADMIN — ROCURONIUM BROMIDE 10 MG: 10 INJECTION INTRAVENOUS at 10:33

## 2018-06-28 RX ADMIN — Medication 5 MG: at 10:41

## 2018-06-28 RX ADMIN — Medication 5 MG: at 10:11

## 2018-06-28 RX ADMIN — FENTANYL CITRATE 50 MCG: 50 INJECTION, SOLUTION INTRAMUSCULAR; INTRAVENOUS at 13:21

## 2018-06-28 RX ADMIN — LIDOCAINE HYDROCHLORIDE 100 MG: 20 INJECTION, SOLUTION INFILTRATION; PERINEURAL at 09:57

## 2018-06-28 RX ADMIN — FENTANYL CITRATE 25 MCG: 50 INJECTION, SOLUTION INTRAMUSCULAR; INTRAVENOUS at 10:48

## 2018-06-28 RX ADMIN — ONDANSETRON 4 MG: 2 INJECTION INTRAMUSCULAR; INTRAVENOUS at 10:26

## 2018-06-28 RX ADMIN — GLYCOPYRROLATE 0.6 MG: 0.2 INJECTION, SOLUTION INTRAMUSCULAR; INTRAVENOUS at 12:32

## 2018-06-28 RX ADMIN — PHENYLEPHRINE HYDROCHLORIDE 50 MCG: 10 INJECTION, SOLUTION INTRAMUSCULAR; INTRAVENOUS; SUBCUTANEOUS at 10:18

## 2018-06-28 RX ADMIN — SODIUM CHLORIDE, POTASSIUM CHLORIDE, SODIUM LACTATE AND CALCIUM CHLORIDE: 600; 310; 30; 20 INJECTION, SOLUTION INTRAVENOUS at 12:38

## 2018-06-28 ASSESSMENT — COPD QUESTIONNAIRES: COPD: 0

## 2018-06-28 ASSESSMENT — LIFESTYLE VARIABLES: TOBACCO_USE: 0

## 2018-06-28 NOTE — H&P (VIEW-ONLY)
[facility]  [address] [place] [id number]  [phone]  Dept: [phone]    PRE-OP EVALUATION:  Today's date: 2018    Antonio Ramirez (: 1945) presents for pre-operative evaluation assessment as requested by Dr. Jose Daniels.  He requires evaluation and anesthesia risk assessment prior to undergoing surgery/procedure for treatment of cystoscopy bilateral ureteroscopoy, holmium laser .    Proposed Surgery/ Procedure: cystoscopy bilateral ureteroscopoy, holmium laser  Date of Surgery/ Procedure: 2018  Time of Surgery/ Procedure: [facility]urgical Facility: [facility]  Fax number for surgical facility:   Primary Physician: Main Hardy  Type of Anesthesia Anticipated: to be determined    Patient has a Health Care Directive or Living Will:  YES in chart    1. YES - Do you have a history of heart attack, stroke, stent, bypass or surgery on an artery in the head, neck, heart or legs?  2. NO - Do you ever have any pain or discomfort in your chest?  3. NO - Do you have a history of  Heart Failure?  4. NO - Are you troubled by shortness of breath when: walking on the level, up a slight hill or at night?  5. NO - Do you currently have a cold, bronchitis or other respiratory infection?  6. NO - Do you have a cough, shortness of breath or wheezing?  7. NO - Do you sometimes get pains in the calves of your legs when you walk?  8. YES, personal hx - Do you or anyone in your family have previous history of blood clots?  He has antiphospholipid antibody syndrome   9. NO - Do you or does anyone in your family have a serious bleeding problem such as prolonged bleeding following surgeries or cuts?  He is on xarelto for this   10. YES - Have you ever had problems with anemia or been told to take iron pills?  11. NO - Have you had any abnormal blood loss such as black, tarry or bloody stools, or abnormal vaginal bleeding?  12. YES - Have you ever had a blood transfusion?     13. NO - Have you or any of your relatives ever had problems with anesthesia?  14. NO - Do you have sleep apnea, excessive snoring or daytime drowsiness?  15. NO - Do you have any prosthetic heart valves?  16. YES - Do you have prosthetic joints?  Right total knee   17. NO - Is there any chance that you may be pregnant?      HPI:     HPI related to upcoming procedure: This is a pleasant 73-year-old retired  with alcoholism and alcoholic cirrhosis who continues to drink alcohol.  He also has a very complex medical history including a history of antiphospholipid antibody syndrome with venous thromboembolism, lymphoma, prostate cancer, coronary artery disease status post coronary artery bypass grafting, thoracic aortic aneurysm, peripheral vascular disease status post above-the-knee amputation, recent subdural hematoma following a fall status post craniotomy, recent hospitalization for sepsis from a urinary tract infection complicated by obstructing nephrolithiasis.  He is scheduled for a ureteroscopy with laser kidney stone removal for tomorrow.  He was started on a new oral anticoagulant (Xarelto) by his hematologist several weeks ago following his subdural hematoma.  He stopped taking Xarelto in anticipation of his urological procedure on Sunday by his report to me.  He has been unable to establish a follow-up appointment with his cardiologist as per my directions at his last visit with me.  He does not currently have any chest pain or shortness of breath when walking up a flight of stairs.  He denies current swelling, orthopnea, PND.  He does admit to currently drinking up to 21 drinks in a week.  At previous visits, he was noted to have iron deficiency anemia.  He was referred for iron infusions, but has not had that procedure yet.  He tells me that he had a normal colonoscopy in [place] within the last 2 years but I do not have a report.    MEDICAL HISTORY:     Patient Active Problem List     Diagnosis Date Noted     Malabsorption of iron 06/08/2018     Priority: Medium     Benign essential hypertension 04/27/2018     Priority: Medium     S/P craniotomy 04/07/2018     Priority: Medium     Subdural hematoma (H) 04/07/2018     Priority: Medium     Paroxysmal a-fib (H) 11/29/2017     Priority: Medium     Thrombocytopenia -- suspect related to alcohol use 11/29/2017     Priority: Medium     Chronic congestive heart failure, unspecified congestive heart failure type (H) 06/30/2017     Priority: Medium     Prostate cancer -- S/P Radiative Seed implants in 2000 (no problems since) 05/24/2017     Priority: Medium     Antiphospholipid Jina Syn, Hypercoag (DVT, PE) -- has IVC filt and Warfarin 05/24/2017     Priority: Medium     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 (H) 05/24/2017     Priority: Medium     Thoracic aortic aneurysm without rupture (H) 05/24/2017     Priority: Medium     Alcohol abuse 09/24/2014     Priority: Medium     Problem list name updated by automated process. Provider to review       Alcoholic cirrhosis of liver (H) 09/24/2014     Priority: Medium     Chronic kidney disease, stage III (moderate) 09/24/2014     Priority: Medium     Physical deconditioning 09/24/2014     Priority: Medium      Past Medical History:   Diagnosis Date     Alcoholism (H)      Amputated left leg (H)      Anemia      Anticardiolipin antibody syndrome (H)      Antiplatelet or antithrombotic long-term use      Aortic stenosis      Arthritis      Balance problem      Cardiomyopathy (H)      Coagulation disorder (H)     cardiolipinantibody syndrome     Coronary artery disease      Gastro-oesophageal reflux disease      Heart attack 2007     Hiatal hernia      Hypercholesterolemia      Hypertension      Left foot drop      Liver disease     alcoholic liver disease, alcoholic cirrohsosis, portal hypertension     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)     ?When diagnosed, patient not aware of this      Moderate mitral regurgitation      Monoclonal gammopathy      Neuropathy      Noninfectious ileitis     diverticulitis of colon     PE (pulmonary embolism) 2006     Prostate cancer (H) 2000    Treated with radiation (seed implants in 2000) -- no problems since     Renal disease     kidney stones     Syncope      Thoracic aortic aneurysm, without rupture      Thrombocytopenia (H)      Thrombosis of leg      Urethral stricture      Past Surgical History:   Procedure Laterality Date     AMPUTATE LEG BELOW KNEE Left 04/2014    related to gangrene, started as foot ulcer related to neuropathy     AMPUTATE LEG BELOW KNEE Left 12/4/2017    Procedure: AMPUTATE LEG BELOW KNEE;  Exploration of Left Leg Below Knee Amputation Stump with Ligation of Bleeders.;  Surgeon: Leandro Arreola MD;  Location:  OR     APPENDECTOMY       APPLY WOUND VAC Left 12/6/2017    Procedure: APPLY WOUND VAC;;  Surgeon: Saima Howard MD;  Location:  SD     ARTHROPLASTY KNEE Right 9/19/2014    Procedure: ARTHROPLASTY KNEE;  Surgeon: Saima Howard MD;  Location:  OR     CARDIAC SURGERY      CABG     CHOLECYSTECTOMY       COLONOSCOPY       CRANIOTOMY Right 4/7/2018    Procedure: CRANIOTOMY;  Right Craniotomy For Subdural Hematoma;  Surgeon: Jono Issa MD;  Location:  OR     CYSTOSCOPY, DILATE URETHRA, COMBINED N/A 10/12/2016    Procedure: COMBINED CYSTOSCOPY, DILATE URETHRA;  Surgeon: Dimitry Bello MD;  Location:  OR     ENDOSCOPIC STRIPPING VEIN(S)       esophagogastroduodenoscopy       EYE SURGERY      cataracts     GENITOURINARY SURGERY      Prostate Seen Implants     HERNIA REPAIR      Umbilical     INCISION AND DRAINAGE LOWER EXTREMITY, COMBINED Left 12/1/2017    Procedure: COMBINED INCISION AND DRAINAGE LOWER EXTREMITY;  INCISION AND DRAINAGE OF LEFT BELOW KNEE AMPUTATION ABSCESS, WOUND VAC PLACEMENT;  Surgeon: Saima Howard MD;  Location:  OR     IR IVC FILTER PLACEMENT       IRRIGATION AND  DEBRIDEMENT LOWER EXTREMITY, COMBINED Left 12/6/2017    Procedure: COMBINED IRRIGATION AND DEBRIDEMENT LOWER EXTREMITY;  IRRIGATION AND DEBRIDEMENT OF LEFT BELOW KNEE AMPUTATION SITE WITH WOUND VAC REPLACEMENT;  Surgeon: Saima Howard MD;  [facility]     IRRIGATION AND DEBRIDEMENT LOWER EXTREMITY, COMBINED Left 12/8/2017    Procedure: COMBINED IRRIGATION AND DEBRIDEMENT LOWER EXTREMITY;  IRRIGATION AND DEBRIDEMENT OF LEFT BELOW THE KNEE AMPUTATION AND SHORTENING OF THE TIBIA WITH WOUND CLOSURE;  Surgeon: Saima Howard MD;  Location:  OR     ORTHOPEDIC SURGERY      (R) knee scope     ORTHOPEDIC SURGERY      total hip      ORTHOPEDIC SURGERY      elbow surgery     Current Outpatient Prescriptions   Medication Sig Dispense Refill     gabapentin (NEURONTIN) 600 MG tablet Take 1 tablet (600 mg) by mouth 3 times daily 270 tablet 3     ASPIRIN NOT PRESCRIBED (INTENTIONAL) Please choose reason not prescribed, below 0 each 0     carvedilol (COREG) 3.125 MG tablet Take 1 tablet (3.125 mg) by mouth 2 times daily (with meals) 180 tablet 1     fluticasone (FLONASE) 50 MCG/ACT spray Spray 2 sprays into both nostrils daily       folic acid (FOLVITE) 1 MG tablet Take 1 tablet (1 mg) by mouth daily 30 tablet      GABAPENTIN PO Take 600 mg by mouth 3 times daily        oxyCODONE IR (ROXICODONE) 5 MG tablet Take 1 tablet (5 mg) by mouth every 6 hours as needed for other (pain control or improvement in physical function. Hold dose for analgesic side effects.) 6 tablet 0     phenazopyridine (PYRIDIUM) 100 MG tablet Take 1 tablet (100 mg) by mouth 3 times daily as needed for urinary tract discomfort 90 tablet 3     rivaroxaban ANTICOAGULANT (XARELTO) 10 MG TABS tablet Take 1 tablet (10 mg) by mouth daily (with dinner)       thiamine 100 MG tablet Take 1 tablet (100 mg) by mouth daily       torsemide (DEMADEX) 20 MG tablet Take 20 mg by mouth daily         Allergies   Allergen Reactions     Hmg-Coa-R Inhibitors Other (See  "Comments)     Rhabdomyolysis occurred within a couple days     Ciprofloxacin Itching     Severe itching \"like ants were crawling\"       Social History   Substance Use Topics     Smoking status: Never Smoker     Smokeless tobacco: Never Used     Alcohol use Yes      Comment: quit 10/2015; now 5-6 Vodkas/night     History   Drug Use No       REVIEW OF SYSTEMS:   Constitutional, neuro, ENT, endocrine, pulmonary, cardiac, gastrointestinal, genitourinary, musculoskeletal, integument and psychiatric systems are negative, except as otherwise noted.    EXAM:   /72  Pulse 74  Temp 97.7  F (36.5  C) (Oral)  Ht 6' 2\" (1.88 m)  Wt 220 lb (99.8 kg)  SpO2 94%  BMI 28.25 kg/m2    General: This is a well-appearing, comfortable appearing man.  HEENT: There is a healed right scalp incision site.  The bilateral tympanic membranes are normal, nasal exam is normal, the posterior pharynx appears normal.  The mucous membranes are moist.  The neck is supple without adenopathy.  Cardiovascular: The heart has an irregularly irregular rhythm with a normal rate.  There are no carotid bruits.  Pulmonary: Lungs are clear to auscultation bilaterally, breathing is not labored, no dullness to percussion.  Abdomen: Soft, not distended, nontender, bowel sounds present, no masses, no obvious organomegaly.  Extremities: There is a left leg amputation.  The right lower extremity is without edema and well perfused.  Neurological: Alert and oriented to person place and time, cranial nerves II to XII appear grossly intact, there is symmetric strength, he walks with a cane.  Mental status: Normal mood and affect, mildly impaired insight and judgment, well-groomed, normal speech.  DIAGNOSTICS:   EKG: His most recent EKG was dated May 21 and showed atrial fibrillation with a ventricular rate of 88 bpm    Recent Labs   Lab Test  05/27/18   0608  05/25/18   0707  05/24/18   0700   05/18/18   1641   04/11/18   0807   HGB  8.9*  8.9*  9.0*   < >  " 10.6*   < >  10.5*   PLT  106*  76*  68*   < >  168   < >  156   INR   --    --   1.68*   --    --    --   1.39*   NA  137  139  138   < >  140   < >  140   POTASSIUM  4.7  4.1  4.0   < >  4.3   < >  2.9*   CR  1.23  1.40*  1.57*   < >  1.00   < >  0.88   A1C   --    --    --    --   4.6   --    --     < > = values in this interval not displayed.        IMPRESSION:   Reason for surgery/procedure: Obstructing nephrolithiasis with recent hospitalization for sepsis due to urinary tract infection  Diagnosis/reason for consult: Preoperative evaluation    The proposed surgical procedure is considered LOW risk.    REVISED CARDIAC RISK INDEX  The patient has the following serious cardiovascular risks for perioperative complications such as (MI, PE, VFib and 3  AV Block):  Coronary Artery Disease (MI, positive stress test, angina, Qs on EKG)  INTERPRETATION: 1 risks: Class II (low risk - 0.9% complication rate)    The patient has the following additional risks for perioperative complications:  Alcohol abuse with risk of withdrawal      ICD-10-CM    1. Coronary artery disease involving native coronary artery of native heart with unstable angina pectoris (H) I25.110 CARDIOLOGY EVAL ADULT REFERRAL   2. Preop general physical exam Z01.818    3. Nephrolithiasis N20.0    4. Thoracic aortic aneurysm without rupture (H) I71.2 Echocardiogram Complete   5. Phantom limb pain (H) G54.6 gabapentin (NEURONTIN) 600 MG tablet   6. Iron deficiency anemia, unspecified iron deficiency anemia type D50.9    7. Alcoholic cirrhosis of liver without ascites (H) K70.30    8. Alcohol abuse F10.10    9. Antiphospholipid Jina Syn, Hypercoag (DVT, PE) -- has IVC filt and Warfarin D68.61    10. 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 (H) C83.39    11. Chronic congestive heart failure, unspecified congestive heart failure type (H) I50.9    12. Paroxysmal a-fib (H) I48.0    13. Subdural hematoma (H) I62.00    14. Benign essential hypertension I10       I think that the risk-benefit ratio favors proceeding with nephrolithiasis procedure tomorrow to avoid future urinary tract infections as serious as his most recent that resulted in sepsis.  However, I still think that he should pursue follow-up for his cardiac conditions including his coronary artery disease and his thoracic aortic aneurysm.  This should certainly be done before elective hip replacement surgery which he hopes to have done in August.  Since he has having trouble scheduling this at the [facility], I did offer him a referral to the [facility] physician heart clinic across the street from this clinic.  I did order an echocardiogram so that the results of that test could be discussed with a cardiologist at the [facility] if he decides to follow-up with a physician there.  I would recommend that he see a cardiologist prior to undergoing elective orthopedic surgery.  With respect to his anemia, I personally gave him the telephone number that he could use to schedule iron infusions.  He will need to improve his anemia before he can be cleared for orthopedic surgery later this summer.  His urologic procedure is of much lower risk and despite his moderate anemia, I think he can proceed with that procedure given the risk benefit ratio outlined above.  He has already stopped his anticoagulant in anticipation of his urologic procedure.  Because of his history of antiphospholipid antibody syndrome, he should restart his Xarelto as soon as appropriate following his urologic procedure.  He was again counseled on abstaining from alcohol.  His volume status appears to be well compensated today.  He seems to have recovered well from the subdural hematoma.  His blood pressure is okay to proceed with his urologic procedure.  He did ask for a refill of gabapentin that he uses for phantom limb pain since his amputation.    RECOMMENDATIONS:      --Consult hospital rounder / IM to assist post-op medical management    --Patient is to take all scheduled medications on the day of surgery EXCEPT for modifications listed below.    Anticoagulant or Antiplatelet Medication Use  XARELTO: Bleeding risk is at least moderate for this procedure and Dabigatran (Xarelto) should be stopped at least 24 hours prior to procedure (he stopped it on Sunday), and I told him that it should be restarted as soon as appropriate following the urological procedure.        APPROVAL GIVEN to proceed with proposed procedure, without further diagnostic evaluation       Signed Electronically by: Main Hardy MD    Copy of this evaluation report is provided to requesting physician.    [facility] Preop Guidelines    Revised Cardiac Risk Index

## 2018-06-28 NOTE — BRIEF OP NOTE
[facility] Brief Operative Note    Pre-operative diagnosis: BILATERAL URETERAL STONE    Post-operative diagnosis BILATERAL URETERAL CALCULI, S/P BILATERAL STENTS, SEPSIS     Procedure: Procedure(s):  CYSTOSCOPY, BILATERAL URETEROSCOPY,  HOLMIUM LASER LITHOTRIPSY, BILATERAL STENT PLACEMENT, STONE BASKETING - Wound Class: II-Clean Contaminated   Surgeon(s): Surgeon(s) and Role:     * Jose Hunter MD - Primary   Estimated blood loss: 5 mL    Specimens:   ID Type Source Tests Collected by Time Destination   1 : RIGHT AND LEFT URETERAL STONES Calculus/Stone Ureter, Right STONE ANALYSIS Jose Hunter MD 6/28/2018 12:28 PM       Findings: LARGE MID RIGHT URETERAL CALCULI, MASSIVE PROXIMAL LEFT URETERAL CALCULI  Anesthesia: General endotracheal anesthesia   Total IV fluids: (See anesthesia record)   Blood transfusion: No transfusion was given during surgery   Total urine output: (See anesthesia record)   Foreign Bodies: Left Ureteral Stent  Right Ureteral StentPermanent Implants: NoneComplications: None    Condition: Stable   Comments: See dictated operative report for full details

## 2018-06-28 NOTE — ANESTHESIA PREPROCEDURE EVALUATION
Anesthesia Evaluation     . Pt has had prior anesthetic. Type: General    No history of anesthetic complications          ROS/MED HX    ENT/Pulmonary:      (-) tobacco use, asthma, COPD and sleep apnea   Neurologic: Comment: Alcohol abuse  Subdural hematoma with craniotomy 2018    (+)neuropathy - Peripheral, other neuro Phantom limb pain    Cardiovascular: Comment: Thoracic aneurysm     Mane Denis MD 2018        Narrative          [id number]  [facility]  [id number]  [id number]^WILL^RENE^CHRYSTAL           [facility]  Echocardiography Laboratory  [address] [place] [address]        Name: GALILEA LUGO  MRN: [id number]  : 1945  Study Date: 2018 07:56 AM  Age: 72 yrs  Gender: Male  Patient Location: [facility]  Reason For Study: Syncope and Collapse  Ordering Physician: RENE SOLIS  Referring Physician: RENE SOLIS  Performed By: KYLER Donovan     BSA: 2.3 m2  Height: 72 in  Weight: 230 lb  HR: 59  BP: 123/58 mmHg  _____________________________________________________________________________  __        Procedure  Complete Portable Echo Adult. Contrast Lumason.  _____________________________________________________________________________  __        Interpretation Summary     Left ventricular systolic function is mildly reduced.  The visual ejection fraction is estimated at 40-45%.  There are regional wall motion abnormalities as specified.  The right ventricle is moderately dilated.  Mildly decreased right ventricular systolic function  There is moderate (2+) mitral regurgitation.  Mild valvular aortic stenosis.  Right ventricular systolic pressure is elevated, consistent with severe  pulmonary hypertension.  Flattened septum is consistent with RV pressure/volume overload.  The study was technically difficult. There is no comparison study available.  _____________________________________________________________________________  __              (+) Dyslipidemia,  hypertension--CAD, -CABG-date: 2005, . Taking blood thinners Pt has received instructions: . CHF Last EF: 40% . fainting (syncope). :. valvular problems/murmurs type: AS and MR TR, severe pulmonary HTN:. pulmonary hypertension, Previous cardiac testing       METS/Exercise Tolerance:     Hematologic: Comments: Thrombocytopenia    (+) History of blood clots pt is anticoagulated, Anemia, History of Transfusion Other Hematologic Disorder-antiphospholipid antibody syndrome       Musculoskeletal:   (+) arthritis, , , other musculoskeletal- left leg amputation      GI/Hepatic:     (+) GERD hiatal hernia, liver disease, Other GI/Hepatic alcoholic liver cirrhosis, no ascites for 2-3 years      Renal/Genitourinary:     (+) chronic renal disease, type: CRI, Pt does not require dialysis, Pt has no history of transplant,       Endo:      (-) Type I DM and Type II DM   Psychiatric:         Infectious Disease:         Malignancy:   (+) Malignancy History of Lymphoma/Leukemia and Prostate  Prostate CA Remission status post, Lymph CA Active status post,         Other:                     Physical Exam  Normal systems: pulmonary and dental    Airway   Mallampati: II  TM distance: >3 FB  Neck ROM: full    Dental     Cardiovascular   Rhythm and rate: regular and normal      Pulmonary                     Anesthesia Plan      History & Physical Review  History and physical reviewed and following examination; no interval change.    ASA Status:  4 .    NPO Status:  > 8 hours    Plan for General and ETT with Intravenous induction. Maintenance will be Inhalation.    PONV prophylaxis:  Ondansetron (or other 5HT-3) and Dexamethasone or Solumedrol  Discussed severity of pulmonary HTN and risk with anesthesia.       Postoperative Care  Postoperative pain management:  IV analgesics.      Consents  Anesthetic plan, risks, benefits and alternatives discussed with:  Patient..                          .

## 2018-06-28 NOTE — OP NOTE
Procedure Date: 06/28/2018      PRELIMINARY DIAGNOSIS:  Bilateral ureteral calculi, status post bilateral stents for UTI sepsis.  History of prostate cancer post brachytherapy.      PROCEDURE:  Cystoscopy, bilateral ureteroscopy, holmium laser lithotripsy, bilateral ureteral stent exchange.  There should be a code 22 modifier for the length of the case and the difficulty of the case.        ESTIMATED BLOOD LOSS:  5 mL or less.        Foreign body left indwelling with 2 ureteral stents, 6 Kazakh 28 cm.      COMPLICATIONS:  None.      INDICATIONS:  Mr. Antonio Ramirez is a 73-year-old gentleman who was hospitalized in May with UTI and bilateral ureteral obstruction secondary to stone.  The patient had a percutaneous tube placed in his left renal pelvis.  This was performed in IR and then he subsequently had internalization of the stents and he was dismissed to home on antibiotics.  He recently was seen and evaluated and wanted to proceed with the laser lithotripsy.  The procedure, risks and complications were fully discussed.  His serum creatinine was relatively normal and that was measured at 1.23.  His hemoglobin typically runs low and most recently was 8.9.  For this reason, he was typed and screened.  The patient was seen preoperatively.  Consent was completed, reviewed and signed.      PROCEDURE DETAILS:  The patient was brought to the OR, given a general anesthetic, prepped and draped in dorsal lithotomy position.  Timeout taken.  The patient and procedure and OR staff identified.  Cystourethroscopic exam revealed minimal urethral stricture disease, normal  sphincter changes of the prostatic urethra from previous brachytherapy.  The bladder was inspected and there were 2 stents indwelling.  The right stent was grasped and under fluoroscopy, I could see the stent pulled down and this was pulled out to the meatus.  A Glidewire was passed through the stent and then fluoroscopy was used to see that the wire was up  in the area of the kidney.  The stent was then removed and then a double-lumen ureteral catheter was threaded over the wire and under fluoroscopic guidance was passed up to the iliac vessels.  A second wire was passed through here and then the double-lumen was exchanged for a navigator sheath.  At this point, I went ahead and passed a flexible scope over the wire up to the stone.  At first I could not see a stone as the patient's ureter was very edematous and multiple folds.  I went all the way up to the level of the kidney and then as I backed out, I could now see one of the larger stones embedded in the wall of the ureter just at the level of the vessels.  The stone was fragmented using a laser fiber.        Once the stone was fragmented, I proceeded to advance the scope towards the ureteral orifice and as I did, I saw an even larger stone also somewhat imbedded in the mucosa.  This was also fragmented using the laser.  All larger fragments were basketed using a 0 tip basket.  At the completion of the procedure, the scope was backed out just distal to the navigator sheath and I could not see any injury to the ureter.  Therefore, after removing the navigator sheath, I backloaded the wire through the cystoscope and a 6-Lux[place]bourgish 28 cm double-J stent was passed over the wire and the wire removed for good position of the right ureteral stent.  I then grasped the left ureteral stent and brought this out to the tip of the penis and now also a Glidewire was passed through this up to the level of the kidney.  The stent was removed.  The double-lumen ureteral catheter was passed.  I then passed a second wire.  The double-lumen ureteral catheter was removed and the navigator sheath was again negotiated over the wire up to the level of the stone.  At this point, I went ahead and again placed the ureteroscope.  These stones on the left side were quite large.  The stones on the right had been 8 x 9 mm and 8 x 8 mm.  On this side,  there was a 1.7 x 1.1 and a 7-8 mm x 5-6 mm stone adjacent to the larger stone in the very proximal ureter.        Under direct vision, the stones were laser ablated and once performed with fluoroscopy, I could see no longer the large shadow created by the stones.  The larger fragments were also basketed and removed.  A few smaller stone fragments were not basketed and at this point I elected to again pass a stent so the scope was backed out just distal to the sheath.  Once again there was no injury to the ureter.  The sheath was removed.  I then removed the ureteroscope, replaced the cystoscope, backloaded the wire and then passed the 6-Korean 28 cm double-J stent over the wire.  The wire was removed with good position of the stents.  The bladder was now emptied and the patient was given a B&O suppository.        PLAN:  The patient will be dismissed to home later today with plans for followup in 10 days with a KUB.  If there are any large fragments in the left kidney, we would suggest he have ESWL and for that reason he will be asked to hold off on restarting his aspirin until the KUB is obtained.         DALI MODI MD             D: 2018   T: 2018   MT: RADAMES      Name:     GALILEA LUGO   MRN:      [id number]        Account:        [id number]   :      1945           Procedure Date: 2018      Document: [id number]

## 2018-06-28 NOTE — ANESTHESIA POSTPROCEDURE EVALUATION
Patient: Antonio Ramirez    Procedure(s):  CYSTOSCOPY, BILATERAL URETEROSCOPY,  HOLMIUM LASER LITHOTRIPSY, BILATERAL STENT PLACEMENT, STONE BASKETING - Wound Class: II-Clean Contaminated    Diagnosis:BILATERAL URETERAL STONE   Diagnosis Additional Information: No value filed.    Anesthesia Type:  General, ETT    Note:  Anesthesia Post Evaluation    Patient location during evaluation: PACU  Patient participation: Able to fully participate in evaluation  Level of consciousness: awake and alert  Pain management: adequate  Airway patency: patent  Cardiovascular status: acceptable  Respiratory status: acceptable  Hydration status: acceptable  PONV: none and controlled     Anesthetic complications: None          Last vitals:  Vitals:    06/28/18 1345 06/28/18 1400 06/28/18 1415   BP: 140/86 138/80 138/85   Resp: 17 12 16   Temp:  36.7  C (98  F)    SpO2: 96% 94% 93%         Electronically Signed By: Praneeth Mcneil MD  June 28, 2018  4:50 PM

## 2018-06-28 NOTE — TELEPHONE ENCOUNTER
Left voicemail message for patient requesting a return call regarding scheduling infusion appt. NEW ORDER

## 2018-06-28 NOTE — OR NURSING
Restart xarelto Saturday or Sunday if your urine is clear to light pink. No clots. TORB Dr Hunter.

## 2018-06-28 NOTE — IP AVS SNAPSHOT
[facility] [address]    [place] [address]    Phone:  [phone]                                       After Visit Summary   6/28/2018    Antonio Ramirez    MRN: [id number]           After Visit Summary Signature Page     I have received my discharge instructions, and my questions have been answered. I have discussed any challenges I see with this plan with the nurse or doctor.    ..........................................................................................................................................  Patient/Patient Representative Signature      ..........................................................................................................................................  Patient Representative Print Name and Relationship to Patient    ..................................................               ................................................  Date                                            Time    ..........................................................................................................................................  Reviewed by Signature/Title    ...................................................              ..............................................  Date                                                            Time

## 2018-06-28 NOTE — DISCHARGE INSTRUCTIONS
Same Day Surgery Discharge Instructions for  Sedation and General Anesthesia       It's not unusual to feel dizzy, light-headed or faint for up to 24 hours after surgery or while taking pain medication.  If you have these symptoms: sit for a few minutes before standing and have someone assist you when you get up to walk or use the bathroom.      You should rest and relax for the next 24 hours. We recommend you make arrangements to have an adult stay with you for at least 24 hours after your discharge.  Avoid hazardous and strenuous activity.      DO NOT DRIVE any vehicle or operate mechanical equipment for 24 hours following the end of your surgery.  Even though you may feel normal, your reactions may be affected by the medication you have received.      Do not drink alcoholic beverages for 24 hours following surgery.       Slowly progress to your regular diet as you feel able. It's not unusual to feel nauseated and/or vomit after receiving anesthesia.  If you develop these symptoms, drink clear liquids (apple juice, ginger ale, broth, 7-up, etc. ) until you feel better.  If your nausea and vomiting persists for 24 hours, please notify your surgeon.        All narcotic pain medications, along with inactivity and anesthesia, can cause constipation. Drinking plenty of liquids and increasing fiber intake will help.      For any questions of a medical nature, call your surgeon.      Do not make important decisions for 24 hours.      If you had general anesthesia, you may have a sore throat for a couple of days related to the breathing tube used during surgery.  You may use Cepacol lozenges to help with this discomfort.  If it worsens or if you develop a fever, contact your surgeon.       If you feel your pain is not well managed with the pain medications prescribed by your surgeon, please contact your surgeon's office to let them know so they can address your concerns.       **If you have questions or concerns about  your procedure,   Call Dr. Hunter at [phone]**      Cystoscopy, Holmium Laser with Stent Placement Discharge Instructions    Holmium laser lithotripsy was used to break up your kidney stone(s). It is normal to have visible blood in the urine, burning, urgency and frequency following this procedure. These symptoms may last for a few days to weeks.     Diet:    To help pass stone fragments and clear the blood out of the urine, it is important to drink 6-8 glasses of fluids per day at home - at least 3-4 glasses should be water.       Return to the diet that you were on before the procedure, unless you are given specific diet instructions.    Activity:    Walk short distances and increase as your strength allows.    You may climb stairs.    Do not do strenuous exercise or heavy lifting until approved by surgeon.    Do not drive while taking narcotic pain medications.    Bathing:    You may take a shower.          Stent information    During surgery, a stent was placed in the ureter.  The ureter is the tube that drains urine from the kidney to the bladder.  The stent is placed to dilate (open) the ureter so the stone fragments can pass easily through the ureter or to decrease ureteral swelling after surgery, or to relieve an obstruction. The stent is made of rubber. The upper end of the stent curls in the kidney while the lower end rests in the bladder.    While the stent is in place you may experience the following symptoms:    Blood and/or small blood clots in urine.    Bladder spasm (frequency and urgency of urination).    Discomfort or aching in the back or side where the stent is.    Burning or discomfort at the end of urine stream.    To decrease these symptoms you should:    Take pain medication as prescribed.    Drink plenty of fluids.    If you experience pain at the end of urination try not emptying your bladder completely.    If having discomfort in back or side, decrease activity.    Call your physician  if these signs/symptoms are present:    Pain that is not relieved by a short rest or ordered pain medications.    Temperature at or above 101.0 F or chills.    Inability or difficulty urinating.     Excessive blood in urine.    Any questions or concerns.    RESTART XARELTO ON Saturday OR Sunday IF URINE CLEAR TO LIGHT PINK AND NO CLOTS SEEN.

## 2018-06-28 NOTE — IP AVS SNAPSHOT
MRN:[id number]                      After Visit Summary   6/28/2018    Antonio Ramirez    MRN: [id number]           Thank you!     Thank you for choosing [facility] for your care. Our goal is always to provide you with excellent care. Hearing back from our patients is one way we can continue to improve our services. Please take a few minutes to complete the written survey that you may receive in the mail after you visit with us. Thank you!        Patient Information     Date Of Birth          1945        About your hospital stay     You were admitted on:  June 28, 2018 You last received care in the:  [facility] PACU    You were discharged on:  June 28, 2018       Who to Call     For medical emergencies, please call 911.  For non-urgent questions about your medical care, please call your primary care provider or clinic, [phone]  For questions related to your surgery, please call your surgery clinic        Attending Provider     Provider Jose Yoo MD Urology       Primary Care Provider Office Phone # Fax #    [facility] [phone]      After Care Instructions     Diet Instructions       Resume pre procedure diet            Discharge Instructions       Patient to arrange for follow up appointment in 7-10 days.  KUB AND POSSIBLE CYSTO WITH STENT REMOVAL.  [facility]  [address] [place] [address]  [phone] (appointments)            Encourage fluids       Encourage fluids at home to keep urine clear to light pink            No Alcohol       for 24 hours post procedure            No Aspirin, Ibuprofen or Naproxen products       for 7 - 10 days following surgery            No driving or operating machinery        until the day after procedure                  Your next 10 appointments already scheduled     Jul 11, 2018 10:45 AM CDT   Neuro Treatment with Eileen Gonzales, PT   [facility]  Physical Therapy ([facility])    [address]  [address]  [place] [address]   [phone]            Jul 11, 2018  3:00 PM CDT   Ech Complete with [id number]   [facility] CV Echocardiography (Cardiovascular Imaging at [facility])    [address]  [place] [address]   [phone]           1. Please bring or wear a comfortable two-piece outfit. 2. You may eat, drink and take your normal medicines. 3. For any questions that cannot be answered, please contact the ordering physician            Jul 19, 2018 11:00 AM CDT   Neuro Treatment with Silvia Goel, PT   [facility] Physical Therapy ([facility])    [address]  [address]  [place] [address]   [phone]            Jul 26, 2018 10:45 AM CDT   Neuro Treatment with Silvia Goel, PT   [facility] Physical Therapy ([facility])    [address]  [address]  [place] [address]   [phone]            Aug 31, 2018   Procedure with Saima Howard MD   [facility] PeriOP Services (--)    [address]  [place] [address]   [phone]            Oct 02, 2018 11:15 AM CDT   New Visit with Elena Downs MD   [facility] ([facility])    [address]  [place] [address]   [phone] OPT 2              Further instructions from your care team       Same Day Surgery Discharge Instructions for  Sedation and General Anesthesia       It's not unusual to feel dizzy, light-headed or faint for up to 24 hours after surgery or while taking pain medication.  If you have these symptoms: sit for a few minutes before standing and have someone assist you when you get up to walk or use the bathroom.      You should rest and relax for the next 24 hours. We recommend you make arrangements to have an adult stay with you for at least 24 hours after your discharge.  Avoid hazardous and  strenuous activity.      DO NOT DRIVE any vehicle or operate mechanical equipment for 24 hours following the end of your surgery.  Even though you may feel normal, your reactions may be affected by the medication you have received.      Do not drink alcoholic beverages for 24 hours following surgery.       Slowly progress to your regular diet as you feel able. It's not unusual to feel nauseated and/or vomit after receiving anesthesia.  If you develop these symptoms, drink clear liquids (apple juice, ginger ale, broth, 7-up, etc. ) until you feel better.  If your nausea and vomiting persists for 24 hours, please notify your surgeon.        All narcotic pain medications, along with inactivity and anesthesia, can cause constipation. Drinking plenty of liquids and increasing fiber intake will help.      For any questions of a medical nature, call your surgeon.      Do not make important decisions for 24 hours.      If you had general anesthesia, you may have a sore throat for a couple of days related to the breathing tube used during surgery.  You may use Cepacol lozenges to help with this discomfort.  If it worsens or if you develop a fever, contact your surgeon.       If you feel your pain is not well managed with the pain medications prescribed by your surgeon, please contact your surgeon's office to let them know so they can address your concerns.       **If you have questions or concerns about your procedure,   Call Dr. Hunter at [phone]**      Cystoscopy, Holmium Laser with Stent Placement Discharge Instructions    Holmium laser lithotripsy was used to break up your kidney stone(s). It is normal to have visible blood in the urine, burning, urgency and frequency following this procedure. These symptoms may last for a few days to weeks.     Diet:    To help pass stone fragments and clear the blood out of the urine, it is important to drink 6-8 glasses of fluids per day at home - at least 3-4 glasses should be  water.       Return to the diet that you were on before the procedure, unless you are given specific diet instructions.    Activity:    Walk short distances and increase as your strength allows.    You may climb stairs.    Do not do strenuous exercise or heavy lifting until approved by surgeon.    Do not drive while taking narcotic pain medications.    Bathing:    You may take a shower.          Stent information    During surgery, a stent was placed in the ureter.  The ureter is the tube that drains urine from the kidney to the bladder.  The stent is placed to dilate (open) the ureter so the stone fragments can pass easily through the ureter or to decrease ureteral swelling after surgery, or to relieve an obstruction. The stent is made of rubber. The upper end of the stent curls in the kidney while the lower end rests in the bladder.    While the stent is in place you may experience the following symptoms:    Blood and/or small blood clots in urine.    Bladder spasm (frequency and urgency of urination).    Discomfort or aching in the back or side where the stent is.    Burning or discomfort at the end of urine stream.    To decrease these symptoms you should:    Take pain medication as prescribed.    Drink plenty of fluids.    If you experience pain at the end of urination try not emptying your bladder completely.    If having discomfort in back or side, decrease activity.    Call your physician if these signs/symptoms are present:    Pain that is not relieved by a short rest or ordered pain medications.    Temperature at or above 101.0 F or chills.    Inability or difficulty urinating.     Excessive blood in urine.    Any questions or concerns.            Pending Results     Date and Time Order Name Status Description    6/28/2018 1302 Surgical pathology exam In process     6/28/2018 1228 Stone analysis In process     6/28/2018 9919 ABO/Rh type and screen In process             Admission Information     Date &  "Time Provider Department Dept. Phone    6/28/2018 Jose Hunter MD [facility] [phone]      Your Vitals Were     Blood Pressure Temperature Respirations Height Weight Pulse Oximetry    138/80 98  F (36.7  C) (Temporal) 12 1.854 m (6' 1\") 96.3 kg (212 lb 6.4 oz) 94%    BMI (Body Mass Index)                   28.02 kg/m2           MyChart Information     [facility] gives you secure access to your electronic health record. If you see a primary care provider, you can also send messages to your care team and make appointments. If you have questions, please call your primary care clinic.  If you do not have a primary care provider, please call [phone] and they will assist you.        Care EveryWhere ID     This is your Care EveryWhere ID. This could be used by other organizations to access your [facility] medical records  [id number]        Equal Access to Services     ANTHONY DORANTES : Evelin Cabrera, belén grande, lee galloway, igor mix. So [facility] [phone].    ATENCIÓN: Si habla español, tiene a shrestha disposición servicios gratuitos de asistencia lingüística. Llame al [phone].    We comply with applicable federal civil rights laws and [place] laws. We do not discriminate on the basis of race, color, national origin, age, disability, sex, sexual orientation, or gender identity.               Review of your medicines      START taking        Dose / Directions    HYDROcodone-acetaminophen 5-325 MG per tablet   Commonly known as:  NORCO   Used for:  Calculus of both ureters        Dose:  1-2 tablet   Take 1-2 tablets by mouth every 4 hours as needed for severe pain (Moderate to Severe Pain)   Quantity:  20 tablet   Refills:  0         CONTINUE these medicines which have NOT CHANGED        Dose / Directions    ASPIRIN NOT PRESCRIBED   Commonly known as:  INTENTIONAL   Used for:  Antiphospholipid antibody syndrome (H)        Please " choose reason not prescribed, below   Quantity:  0 each   Refills:  0       carvedilol 3.125 MG tablet   Commonly known as:  COREG        Dose:  3.125 mg   Take 1 tablet (3.125 mg) by mouth 2 times daily (with meals)   Quantity:  180 tablet   Refills:  1       fluticasone 50 MCG/ACT spray   Commonly known as:  FLONASE        Dose:  2 spray   Spray 2 sprays into both nostrils daily   Refills:  0       folic acid 1 MG tablet   Commonly known as:  FOLVITE   Used for:  Alcohol dependence in remission (H)        Dose:  1 mg   Take 1 tablet (1 mg) by mouth daily   Quantity:  30 tablet   Refills:  0       gabapentin 600 MG tablet   Commonly known as:  NEURONTIN   Used for:  Phantom limb pain (H)        Dose:  600 mg   Take 1 tablet (600 mg) by mouth 3 times daily   Quantity:  270 tablet   Refills:  3       oxyCODONE IR 5 MG tablet   Commonly known as:  ROXICODONE   Used for:  Urinary obstruction        Dose:  5 mg   Take 1 tablet (5 mg) by mouth every 6 hours as needed for other (pain control or improvement in physical function. Hold dose for analgesic side effects.)   Quantity:  6 tablet   Refills:  0       phenazopyridine 100 MG tablet   Commonly known as:  PYRIDIUM   Used for:  Urinary tract infection without hematuria, site unspecified        Dose:  100 mg   Take 1 tablet (100 mg) by mouth 3 times daily as needed for urinary tract discomfort   Quantity:  90 tablet   Refills:  3       thiamine 100 MG tablet   Used for:  Alcohol dependence in remission (H)        Dose:  100 mg   Take 1 tablet (100 mg) by mouth daily   Refills:  0       torsemide 20 MG tablet   Commonly known as:  DEMADEX        Dose:  20 mg   Take 20 mg by mouth daily   Refills:  0       XARELTO 10 MG Tabs tablet   Generic drug:  rivaroxaban ANTICOAGULANT        Dose:  10 mg   Take 1 tablet (10 mg) by mouth daily (with dinner)   Refills:  0            Where to get your medicines      Some of these will need a paper prescription and others can be bought  over the counter. Ask your nurse if you have questions.     Bring a paper prescription for each of these medications     HYDROcodone-acetaminophen 5-325 MG per tablet                Protect others around you: Learn how to safely use, store and throw away your medicines at www.disposemymeds.org.        Information about OPIOIDS     PRESCRIPTION OPIOIDS: WHAT YOU NEED TO KNOW   We gave you an opioid (narcotic) pain medicine. It is important to manage your pain, but opioids are not always the best choice. You should first try all the other options your care team gave you. Take this medicine for as short a time (and as few doses) as possible.     These medicines have risks:    DO NOT drive when on new or higher doses of pain medicine. These medicines can affect your alertness and reaction times, and you could be arrested for driving under the influence (DUI). If you need to use opioids long-term, talk to your care team about driving.    DO NOT operate heave machinery    DO NOT do any other dangerous activities while taking these medicines.     DO NOT drink any alcohol while taking these medicines.      If the opioid prescribed includes acetaminophen, DO NOT take with any other medicines that contain acetaminophen. Read all labels carefully. Look for the word  acetaminophen  or  Tylenol.  Ask your pharmacist if you have questions or are unsure.    You can get addicted to pain medicines, especially if you have a history of addiction (chemical, alcohol or substance dependence). Talk to your care team about ways to reduce this risk.    Store your pills in a secure place, locked if possible. We will not replace any lost or stolen medicine. If you don t finish your medicine, please throw away (dispose) as directed by your pharmacist. The [place] [employer]ution [employer] has more information about safe disposal: https://www.pca.state.[place]/living-green/managing-unwanted-medications.     All opioids tend to cause  constipation. Drink plenty of water and eat foods that have a lot of fiber, such as fruits, vegetables, prune juice, apple juice and high-fiber cereal. Take a laxative (Miralax, milk of magnesia, Colace, Senna) if you don t move your bowels at least every other day.              Medication List: This is a list of all your medications and when to take them. Check marks below indicate your daily home schedule. Keep this list as a reference.      Medications           Morning Afternoon Evening Bedtime As Needed    ASPIRIN NOT PRESCRIBED   Commonly known as:  INTENTIONAL   Please choose reason not prescribed, below                                carvedilol 3.125 MG tablet   Commonly known as:  COREG   Take 1 tablet (3.125 mg) by mouth 2 times daily (with meals)                                fluticasone 50 MCG/ACT spray   Commonly known as:  FLONASE   Spray 2 sprays into both nostrils daily                                folic acid 1 MG tablet   Commonly known as:  FOLVITE   Take 1 tablet (1 mg) by mouth daily                                gabapentin 600 MG tablet   Commonly known as:  NEURONTIN   Take 1 tablet (600 mg) by mouth 3 times daily                                HYDROcodone-acetaminophen 5-325 MG per tablet   Commonly known as:  NORCO   Take 1-2 tablets by mouth every 4 hours as needed for severe pain (Moderate to Severe Pain)                                oxyCODONE IR 5 MG tablet   Commonly known as:  ROXICODONE   Take 1 tablet (5 mg) by mouth every 6 hours as needed for other (pain control or improvement in physical function. Hold dose for analgesic side effects.)                                phenazopyridine 100 MG tablet   Commonly known as:  PYRIDIUM   Take 1 tablet (100 mg) by mouth 3 times daily as needed for urinary tract discomfort                                thiamine 100 MG tablet   Take 1 tablet (100 mg) by mouth daily                                torsemide 20 MG tablet   Commonly known as:   DEMADEX   Take 20 mg by mouth daily                                XARELTO 10 MG Tabs tablet   Take 1 tablet (10 mg) by mouth daily (with dinner)   Generic drug:  rivaroxaban ANTICOAGULANT

## 2018-06-29 LAB — COPATH REPORT: NORMAL

## 2018-06-29 NOTE — PROGRESS NOTES
"Clinic Care Coordination Contact  Care Team Conversations    Data: [facility] had not called writer back since previous -CCC documentation on 5/29/18. Writer called [facility] ([facility]) this morning.     Lupis said that patient went to an appointment on 5/29/18 and did not return to [facility] that day.  [facility] staff reached patient via phone who told them the [facility] was not a good fit for him, and he was not planning to return to [facility]. [facility] made a Vulnerable Adult report.    Per chart review, patient was most recently seen in the [facility] on 6/27/18. Documentation from this clinic visit states, in part: \"This is a pleasant 73-year-old retired  with alcoholism and alcoholic cirrhosis who continues to drink alcohol.  He also has a very complex medical history including a history of antiphospholipid antibody syndrome with venous thromboembolism, lymphoma, prostate cancer, coronary artery disease status post coronary artery bypass grafting, thoracic aortic aneurysm, peripheral vascular disease status post above-the-knee amputation, recent subdural hematoma following a fall status post craniotomy, recent hospitalization for sepsis from a urinary tract infection complicated by obstructing nephrolithiasis.\"    Per chart review, patient had a procedure yesterday--\"CYSTOSCOPY, BILATERAL URETEROSCOPY,  HOLMIUM LASER LITHOTRIPSY, BILATERAL STENT PLACEMENT, STONE BASKETING\"     Plan: [facility] will call patient to see how patient is doing at home and will discuss community resources as indicated.      TREMAYNE Torrez  [facility] Care Coordination  Clinic: [place]   Email: [email or web address]  Tele: [phone]            "

## 2018-07-02 LAB
APPEARANCE STONE: NORMAL
COMPN STONE: NORMAL
NUMBER STONE: NORMAL
SIZE STONE: NORMAL MM
WT STONE: 332 MG

## 2018-07-06 ENCOUNTER — INFUSION THERAPY VISIT (OUTPATIENT)
Dept: INFUSION THERAPY | Facility: CLINIC | Age: 73
End: 2018-07-06
Attending: INTERNAL MEDICINE
Payer: MEDICARE

## 2018-07-06 VITALS
TEMPERATURE: 97.5 F | HEART RATE: 58 BPM | RESPIRATION RATE: 16 BRPM | SYSTOLIC BLOOD PRESSURE: 110 MMHG | DIASTOLIC BLOOD PRESSURE: 63 MMHG

## 2018-07-06 DIAGNOSIS — K90.9 MALABSORPTION OF IRON: Primary | ICD-10-CM

## 2018-07-06 DIAGNOSIS — D62 ANEMIA DUE TO BLOOD LOSS, ACUTE: ICD-10-CM

## 2018-07-06 PROCEDURE — 96365 THER/PROPH/DIAG IV INF INIT: CPT

## 2018-07-06 PROCEDURE — 25000128 H RX IP 250 OP 636: Performed by: INTERNAL MEDICINE

## 2018-07-06 RX ADMIN — FERRIC CARBOXYMALTOSE INJECTION 750 MG: 50 INJECTION, SOLUTION INTRAVENOUS at 15:49

## 2018-07-06 RX ADMIN — SODIUM CHLORIDE 250 ML: 9 INJECTION, SOLUTION INTRAVENOUS at 15:48

## 2018-07-06 ASSESSMENT — PAIN SCALES - GENERAL: PAINLEVEL: MODERATE PAIN (5)

## 2018-07-06 NOTE — MR AVS SNAPSHOT
After Visit Summary   7/6/2018    Antonio Raimrez    MRN: [id number]           Patient Information     Date Of Birth          1945        Visit Information        Provider Department      7/6/2018 3:00 PM  INFUSION CHAIR 14 [facility]        Today's Diagnoses     Malabsorption of iron    -  1    Anemia due to blood loss, acute           Follow-ups after your visit        Your next 10 appointments already scheduled     Jul 11, 2018 10:45 AM CDT   Neuro Treatment with Eileen Gonzales, PT   [facility] ([facility])    [address] [place] [address]  [address]  [facility] [address]   [phone]            Jul 11, 2018  3:00 PM CDT   Ech Complete with [id number]   [facility] CV Echocardiography ([facility])    [address]  [place] [address] [phone]           1. Please bring or wear a comfortable two-piece outfit. 2. You may eat, drink and take your normal medicines. 3. For any questions that cannot be answered, please contact the ordering physician            Jul 13, 2018  2:00 PM CDT   Level 1 with  INFUSION CHAIR 13   [facility] ([facility])    [facility]  [address]  [facility] [address]   [phone]            Jul 19, 2018 11:00 AM CDT   Neuro Treatment with Silvia Goel, PT   [facility] ([facility])    [address] [place] [address]  [address]  [facility] [address]   [phone]            Jul 26, 2018 10:45 AM CDT   Neuro Treatment with Silvia Goel, PT   [facility] ([facility])    [address] [place] [address]  [address]  [facility] [address]   [phone]            Aug 31, 2018   Procedure with Saima Howard MD   [facility] PeriOP Services (--)    [address]  [facility] [address] [phone]             Oct 02, 2018 11:15 AM CDT   New Visit with Elena Downs MD   [facility] ([facility])    [address]  [facility] [address] [phone] OPT 2              Who to contact     If you have questions or need follow up information about today's clinic visit or your schedule please contact [facility] directly at [phone].  Normal or non-critical lab and imaging results will be communicated to you by zappithart, letter or phone within 4 business days after the clinic has received the results. If you do not hear from us within 7 days, please contact the clinic through [employer] or phone. If you have a critical or abnormal lab result, we will notify you by phone as soon as possible.  Submit refill requests through [employer] or call your pharmacy and they will forward the refill request to us. Please allow 3 business days for your refill to be completed.          Additional Information About Your Visit        [employer] Information     [employer] gives you secure access to your electronic health record. If you see a primary care provider, you can also send messages to your care team and make appointments. If you have questions, please call your primary care clinic.  If you do not have a primary care provider, please call [phone] and they will assist you.        Care EveryWhere ID     This is your Care EveryWhere ID. This could be used by other organizations to access your [place] medical records  [id number]        Your Vitals Were     Pulse Temperature Respirations             58 97.5  F (36.4  C) (Oral) 16          Blood Pressure from Last 3 Encounters:   07/06/18 110/58   06/28/18 138/85   06/27/18 142/72    Weight from Last 3 Encounters:   06/28/18 96.3 kg (212 lb 6.4 oz)   06/27/18 99.8 kg (220 lb)   06/07/18 99.8 kg (220 lb)              Today, you had the following     No orders found for display       Primary Care  Provider Office Phone # Fax #    Main Hardy [phone] [address] [facility] [address]  [facility] [address]        Equal Access to Services     ANTHONY DORANTES : Evelin peyton munguia haoo Deborah, waraymondda luqmandy, qaybta kaalmada laverne, igor ross labubbajigna mix. So [facility] [phone].    ATENCIÓN: Si habla español, tiene a shrestha disposición servicios gratuitos de asistencia lingüística. Llame al [phone].    We comply with applicable federal civil rights laws and [place] laws. We do not discriminate on the basis of race, color, national origin, age, disability, sex, sexual orientation, or gender identity.            Thank you!     Thank you for choosing [facility]  for your care. Our goal is always to provide you with excellent care. Hearing back from our patients is one way we can continue to improve our services. Please take a few minutes to complete the written survey that you may receive in the mail after your visit with us. Thank you!             Your Updated Medication List - Protect others around you: Learn how to safely use, store and throw away your medicines at www.disposemymeds.org.          This list is accurate as of 7/6/18  4:27 PM.  Always use your most recent med list.                   Brand Name Dispense Instructions for use Diagnosis    ASPIRIN NOT PRESCRIBED    INTENTIONAL    0 each    Please choose reason not prescribed, below    Antiphospholipid antibody syndrome (H)       carvedilol 3.125 MG tablet    COREG    180 tablet    Take 1 tablet (3.125 mg) by mouth 2 times daily (with meals)        fluticasone 50 MCG/ACT spray    FLONASE     Spray 2 sprays into both nostrils daily        folic acid 1 MG tablet    FOLVITE    30 tablet    Take 1 tablet (1 mg) by mouth daily    Alcohol dependence in remission (H)       gabapentin 600 MG tablet    NEURONTIN    270 tablet    Take 1 tablet (600 mg) by mouth 3 times daily    Phantom limb pain  (H)       HYDROcodone-acetaminophen 5-325 MG per tablet    NORCO    20 tablet    Take 1-2 tablets by mouth every 4 hours as needed for severe pain (Moderate to Severe Pain)    Calculus of both ureters       oxyCODONE IR 5 MG tablet    ROXICODONE    6 tablet    Take 1 tablet (5 mg) by mouth every 6 hours as needed for other (pain control or improvement in physical function. Hold dose for analgesic side effects.)    Urinary obstruction       phenazopyridine 100 MG tablet    PYRIDIUM    90 tablet    Take 1 tablet (100 mg) by mouth 3 times daily as needed for urinary tract discomfort    Urinary tract infection without hematuria, site unspecified       thiamine 100 MG tablet      Take 1 tablet (100 mg) by mouth daily    Alcohol dependence in remission (H)       torsemide 20 MG tablet    DEMADEX     Take 20 mg by mouth daily        XARELTO 10 MG Tabs tablet   Generic drug:  rivaroxaban ANTICOAGULANT      Take 1 tablet (10 mg) by mouth daily (with dinner)

## 2018-07-06 NOTE — PROGRESS NOTES
Infusion Nursing Note:  Antonio Ramirez presents today for Injectafer.    Patient seen by provider today: No   present during visit today: Not Applicable.    Note: N/A.    Intravenous Access:  Peripheral IV placed.    Treatment Conditions:  Not Applicable.    Post Infusion Assessment:  Patient tolerated infusion without incident.  Patient observed for 30 minutes post Injectafer per protocol.  Site patent and intact, free from redness, edema or discomfort.  No evidence of extravasations.  Access discontinued per protocol.    Discharge Plan:   Discharge instructions reviewed with: Patient.  Patient and/or family verbalized understanding of discharge instructions and all questions answered.  AVS to patient via Mila.  Patient will return 7/13/2018 for next appointment.   Patient discharged in stable condition accompanied by: self.  Departure Mode: Ambulatory.    Vera Taylor RN

## 2018-07-11 ENCOUNTER — HOSPITAL ENCOUNTER (OUTPATIENT)
Dept: PHYSICAL THERAPY | Facility: CLINIC | Age: 73
Setting detail: THERAPIES SERIES
End: 2018-07-11
Attending: PHYSICAL MEDICINE & REHABILITATION
Payer: MEDICARE

## 2018-07-11 ENCOUNTER — HOSPITAL ENCOUNTER (OUTPATIENT)
Dept: CARDIOLOGY | Facility: CLINIC | Age: 73
Discharge: HOME | End: 2018-07-11
Attending: INTERNAL MEDICINE | Admitting: INTERNAL MEDICINE
Payer: MEDICARE

## 2018-07-11 DIAGNOSIS — I71.20 THORACIC AORTIC ANEURYSM WITHOUT RUPTURE (H): ICD-10-CM

## 2018-07-11 PROCEDURE — 97110 THERAPEUTIC EXERCISES: CPT | Mod: GP | Performed by: PHYSICAL THERAPIST

## 2018-07-11 PROCEDURE — 93306 TTE W/DOPPLER COMPLETE: CPT | Mod: 26 | Performed by: INTERNAL MEDICINE

## 2018-07-11 PROCEDURE — 25500064 ZZH RX 255 OP 636: Performed by: INTERNAL MEDICINE

## 2018-07-11 PROCEDURE — 40000264 ECHO COMPLETE WITH OPTISON

## 2018-07-11 PROCEDURE — 40000719 ZZHC STATISTIC PT DEPARTMENT NEURO VISIT: Performed by: PHYSICAL THERAPIST

## 2018-07-11 RX ADMIN — HUMAN ALBUMIN MICROSPHERES AND PERFLUTREN 9 ML: 10; .22 INJECTION, SOLUTION INTRAVENOUS at 15:45

## 2018-07-11 NOTE — PROGRESS NOTES
The following letter pertains to your most recent diagnostic tests:    Good news! Your echocardiogram has not changed significantly since your last study.        Sincerely,    Dr. Hardy

## 2018-07-11 NOTE — LETTER
[facility]  [address] [facility]  [address]  [place]  [address]  Tel: [phone]    July 12, 2018    Antonio Ramirez  [address]   [facility] [address]        Dear Mr. Ramirez,    Good news! Your echocardiogram has not changed significantly since your last study.    Sincerely,    Main Hardy MD/RENETTA

## 2018-07-13 ENCOUNTER — INFUSION THERAPY VISIT (OUTPATIENT)
Dept: INFUSION THERAPY | Facility: CLINIC | Age: 73
End: 2018-07-13
Attending: INTERNAL MEDICINE
Payer: MEDICARE

## 2018-07-13 VITALS
SYSTOLIC BLOOD PRESSURE: 148 MMHG | DIASTOLIC BLOOD PRESSURE: 77 MMHG | OXYGEN SATURATION: 96 % | RESPIRATION RATE: 16 BRPM | TEMPERATURE: 98.1 F | HEART RATE: 66 BPM

## 2018-07-13 DIAGNOSIS — K90.9 MALABSORPTION OF IRON: Primary | ICD-10-CM

## 2018-07-13 DIAGNOSIS — D62 ANEMIA DUE TO BLOOD LOSS, ACUTE: ICD-10-CM

## 2018-07-13 PROCEDURE — 96374 THER/PROPH/DIAG INJ IV PUSH: CPT

## 2018-07-13 PROCEDURE — 25000128 H RX IP 250 OP 636: Performed by: INTERNAL MEDICINE

## 2018-07-13 RX ADMIN — FERRIC CARBOXYMALTOSE INJECTION 750 MG: 50 INJECTION, SOLUTION INTRAVENOUS at 14:19

## 2018-07-13 RX ADMIN — SODIUM CHLORIDE 250 ML: 9 INJECTION, SOLUTION INTRAVENOUS at 14:19

## 2018-07-13 ASSESSMENT — PAIN SCALES - GENERAL: PAINLEVEL: NO PAIN (0)

## 2018-07-13 NOTE — PROGRESS NOTES
Infusion Nursing Note:  Antonio Ramirez presents today for injectafer.    Patient seen by provider today: No   present during visit today: Not Applicable.    Note: N/A.    Intravenous Access:  Peripheral IV placed.    Treatment Conditions:  Not Applicable.      Post Infusion Assessment:  Patient tolerated infusion without incident.  Patient observed for 30 minutes post injectafer per protocol.  Site patent and intact, free from redness, edema or discomfort.  No evidence of extravasations.  Access discontinued per protocol.    Discharge Plan:   Discharge instructions reviewed with: Patient.  Patient and/or family verbalized understanding of discharge instructions and all questions answered.  AVS to patient via DRESSBOOMT.  Patient will return prn for next appointment.   Patient discharged in stable condition accompanied by: self.  Departure Mode: Ambulatory.    Freya Parham RN

## 2018-07-13 NOTE — MR AVS SNAPSHOT
After Visit Summary   7/13/2018    Antonio Ramirez    MRN: [id number]           Patient Information     Date Of Birth          1945        Visit Information        Provider Department      7/13/2018 2:00 PM  INFUSION CHAIR 13 [facility]        Today's Diagnoses     Malabsorption of iron    -  1    Anemia due to blood loss, acute           Follow-ups after your visit        Your next 10 appointments already scheduled     Jul 23, 2018   Procedure with Jose Hunter MD   [facility] Peri[facility] (--)    [address]  [facility] [address]   [phone]            Aug 31, 2018   Procedure with Saima Howard MD   [facility] (--)    [address]  [facility] [address]   [phone]            Oct 02, 2018 11:15 AM CDT   New Visit with Elena Downs MD   [facility] ([facility])    [address]  [facility] [address] [phone] OPT 2              Who to contact     If you have questions or need follow up information about today's clinic visit or your schedule please contact [facility] directly at [phone].  Normal or non-critical lab and imaging results will be communicated to you by MyChart, letter or phone within 4 business days after the clinic has received the results. If you do not hear from us within 7 days, please contact the clinic through MyChart or phone. If you have a critical or abnormal lab result, we will notify you by phone as soon as possible.  Submit refill requests through [employer] or call your pharmacy and they will forward the refill request to us. Please allow 3 business days for your refill to be completed.          Additional Information About Your Visit        [employer] Information     [employer] gives you secure access to your electronic health record. If you see a primary  care provider, you can also send messages to your care team and make appointments. If you have questions, please call your primary care clinic.  If you do not have a primary care provider, please call [phone] and they will assist you.        Care EveryWhere ID     This is your Care EveryWhere ID. This could be used by other organizations to access your [place] medical records  [id number]        Your Vitals Were     Pulse Temperature Respirations Pulse Oximetry          66 98.1  F (36.7  C) (Oral) 16 96%         Blood Pressure from Last 3 Encounters:   07/13/18 148/77   07/06/18 110/63   06/28/18 138/85    Weight from Last 3 Encounters:   06/28/18 96.3 kg (212 lb 6.4 oz)   06/27/18 99.8 kg (220 lb)   06/07/18 99.8 kg (220 lb)              Today, you had the following     No orders found for display       Primary Care Provider Office Phone # Fax #    Main MARROQUIN MD Antony [phone] [address]  [place] [address]        Equal Access to Services     [facility]: Hadii aad ku hadasho Sosylvia, waaxda luqadaha, qaybta kaalmadenise galloway, igor miller . So [facility] [phone].    ATENCIÓN: Si habla español, tiene a shrestha disposición servicios gratuitos de asistencia lingüística. Anabelle al [phone].    We comply with applicable federal civil rights laws and [place] laws. We do not discriminate on the basis of race, color, national origin, age, disability, sex, sexual orientation, or gender identity.            Thank you!     Thank you for choosing [facility] AND [facility]  for your care. Our goal is always to provide you with excellent care. Hearing back from our patients is one way we can continue to improve our services. Please take a few minutes to complete the written survey that you may receive in the mail after your visit with us. Thank you!             Your Updated Medication List - Protect others around you: Learn how to safely  use, store and throw away your medicines at www.disposemymeds.org.          This list is accurate as of 7/13/18  4:08 PM.  Always use your most recent med list.                   Brand Name Dispense Instructions for use Diagnosis    ASPIRIN NOT PRESCRIBED    INTENTIONAL    0 each    Please choose reason not prescribed, below    Antiphospholipid antibody syndrome (H)       carvedilol 3.125 MG tablet    COREG    180 tablet    Take 1 tablet (3.125 mg) by mouth 2 times daily (with meals)        fluticasone 50 MCG/ACT spray    FLONASE     Spray 2 sprays into both nostrils daily        folic acid 1 MG tablet    FOLVITE    30 tablet    Take 1 tablet (1 mg) by mouth daily    Alcohol dependence in remission (H)       gabapentin 600 MG tablet    NEURONTIN    270 tablet    Take 1 tablet (600 mg) by mouth 3 times daily    Phantom limb pain (H)       HYDROcodone-acetaminophen 5-325 MG per tablet    NORCO    20 tablet    Take 1-2 tablets by mouth every 4 hours as needed for severe pain (Moderate to Severe Pain)    Calculus of both ureters       oxyCODONE IR 5 MG tablet    ROXICODONE    6 tablet    Take 1 tablet (5 mg) by mouth every 6 hours as needed for other (pain control or improvement in physical function. Hold dose for analgesic side effects.)    Urinary obstruction       phenazopyridine 100 MG tablet    PYRIDIUM    90 tablet    Take 1 tablet (100 mg) by mouth 3 times daily as needed for urinary tract discomfort    Urinary tract infection without hematuria, site unspecified       thiamine 100 MG tablet      Take 1 tablet (100 mg) by mouth daily    Alcohol dependence in remission (H)       torsemide 20 MG tablet    DEMADEX     Take 20 mg by mouth daily        XARELTO 10 MG Tabs tablet   Generic drug:  rivaroxaban ANTICOAGULANT      Take 1 tablet (10 mg) by mouth daily (with dinner)

## 2018-07-17 ENCOUNTER — TRANSFERRED RECORDS (OUTPATIENT)
Dept: HEALTH INFORMATION MANAGEMENT | Facility: CLINIC | Age: 73
End: 2018-07-17

## 2018-07-18 ENCOUNTER — OFFICE VISIT (OUTPATIENT)
Dept: FAMILY MEDICINE | Facility: CLINIC | Age: 73
End: 2018-07-18
Payer: MEDICARE

## 2018-07-18 VITALS
WEIGHT: 210 LBS | SYSTOLIC BLOOD PRESSURE: 104 MMHG | DIASTOLIC BLOOD PRESSURE: 57 MMHG | TEMPERATURE: 98.3 F | HEART RATE: 55 BPM | OXYGEN SATURATION: 97 % | BODY MASS INDEX: 26.95 KG/M2 | HEIGHT: 74 IN

## 2018-07-18 DIAGNOSIS — Z01.818 PREOP GENERAL PHYSICAL EXAM: Primary | ICD-10-CM

## 2018-07-18 DIAGNOSIS — N20.1 CALCULUS OF URETER: ICD-10-CM

## 2018-07-18 PROCEDURE — 99214 OFFICE O/P EST MOD 30 MIN: CPT | Performed by: NURSE PRACTITIONER

## 2018-07-18 NOTE — MR AVS SNAPSHOT
After Visit Summary   7/18/2018    Antonio Ramirez    MRN: [id number]           Patient Information     Date Of Birth          1945        Visit Information        Provider Department      7/18/2018 11:00 AM Carina Salinas APRN [facility]        Today's Diagnoses     Preop general physical exam    -  1    Calculus of ureter          Care Instructions      Before Your Surgery      Call your surgeon if there is any change in your health. This includes signs of a cold or flu (such as a sore throat, runny nose, cough, rash or fever).    Do not smoke, drink alcohol or take over the counter medicine (unless your surgeon or primary care doctor tells you to) for the 24 hours before and after surgery.    If you take prescribed drugs: Follow your doctor s orders about which medicines to take and which to stop until after surgery.    Eating and drinking prior to surgery: follow the instructions from your surgeon    Take a shower or bath the night before surgery. Use the soap your surgeon gave you to gently clean your skin. If you do not have soap from your surgeon, use your regular soap. Do not shave or scrub the surgery site.  Wear clean pajamas and have clean sheets on your bed.           Follow-ups after your visit        Your next 10 appointments already scheduled     Jul 23, 2018   Procedure with Jose Hunter MD   [facility] Peri[facility] (--)    [address]  [facility] [address]   [phone]            Aug 31, 2018   Procedure with Saima Howard MD   [facility] (--)    [address]  [facility] [address]   [phone]            Oct 02, 2018 11:15 AM CDT   New Visit with Elena Downs MD   [facility] ([facility])    [address]  [facility] [address]   [phone] OPT 2              Who to contact     If you have questions or need follow  "up information about today's clinic visit or your schedule please contact [facility] directly at [phone].  Normal or non-critical lab and imaging results will be communicated to you by MyChart, letter or phone within 4 business days after the clinic has received the results. If you do not hear from us within 7 days, please contact the clinic through [employer] or phone. If you have a critical or abnormal lab result, we will notify you by phone as soon as possible.  Submit refill requests through [employer] or call your pharmacy and they will forward the refill request to us. Please allow 3 business days for your refill to be completed.          Additional Information About Your Visit        [employer] Information     [employer] gives you secure access to your electronic health record. If you see a primary care provider, you can also send messages to your care team and make appointments. If you have questions, please call your primary care clinic.  If you do not have a primary care provider, please call [phone] and they will assist you.        Care EveryWhere ID     This is your Care EveryWhere ID. This could be used by other organizations to access your [place] medical records  [id number]        Your Vitals Were     Pulse Temperature Height Pulse Oximetry BMI (Body Mass Index)       55 98.3  F (36.8  C) (Tympanic) 6' 2\" (1.88 m) 97% 26.96 kg/m2        Blood Pressure from Last 3 Encounters:   07/18/18 104/57   07/13/18 148/77   07/06/18 110/63    Weight from Last 3 Encounters:   07/18/18 210 lb (95.3 kg)   06/28/18 212 lb 6.4 oz (96.3 kg)   06/27/18 220 lb (99.8 kg)              Today, you had the following     No orders found for display       Primary Care Provider Office Phone # Fax #    Main Hardy [phone] [address]  [place] [address]        Equal Access to Services     ANTHONY DORANTES AH: Hadii peyton ku hadasho Soomaali, waaxda luqadaha, qaybta kaalmada quanyada, " igor montemayorbreonna mata'aan ah. So [facility] [phone].    ATENCIÓN: Si amyla juan daniel, tiene a shrestha disposición servicios gratuitos de asistencia lingüística. Anabelle santana [phone].    We comply with applicable federal civil rights laws and [place] laws. We do not discriminate on the basis of race, color, national origin, age, disability, sex, sexual orientation, or gender identity.            Thank you!     Thank you for choosing [facility]  for your care. Our goal is always to provide you with excellent care. Hearing back from our patients is one way we can continue to improve our services. Please take a few minutes to complete the written survey that you may receive in the mail after your visit with us. Thank you!             Your Updated Medication List - Protect others around you: Learn how to safely use, store and throw away your medicines at www.disposemymeds.org.          This list is accurate as of 7/18/18 11:59 PM.  Always use your most recent med list.                   Brand Name Dispense Instructions for use Diagnosis    aspirin 81 MG tablet      Take 81 mg by mouth daily        carvedilol 3.125 MG tablet    COREG    180 tablet    Take 1 tablet (3.125 mg) by mouth 2 times daily (with meals)        fluticasone 50 MCG/ACT spray    FLONASE     Spray 2 sprays into both nostrils daily        folic acid 1 MG tablet    FOLVITE    30 tablet    Take 1 tablet (1 mg) by mouth daily    Alcohol dependence in remission (H)       gabapentin 600 MG tablet    NEURONTIN    270 tablet    Take 1 tablet (600 mg) by mouth 3 times daily    Phantom limb pain (H)       HYDROcodone-acetaminophen 5-325 MG per tablet    NORCO    20 tablet    Take 1-2 tablets by mouth every 4 hours as needed for severe pain (Moderate to Severe Pain)    Calculus of both ureters       oxyCODONE IR 5 MG tablet    ROXICODONE    6 tablet    Take 1 tablet (5 mg) by mouth every 6 hours as needed for other (pain control or improvement  in physical function. Hold dose for analgesic side effects.)    Urinary obstruction       phenazopyridine 100 MG tablet    PYRIDIUM    90 tablet    Take 1 tablet (100 mg) by mouth 3 times daily as needed for urinary tract discomfort    Urinary tract infection without hematuria, site unspecified       torsemide 20 MG tablet    DEMADEX     Take 20 mg by mouth daily        XARELTO 10 MG Tabs tablet   Generic drug:  rivaroxaban ANTICOAGULANT      Take 1 tablet (10 mg) by mouth daily (with dinner)

## 2018-07-18 NOTE — PROGRESS NOTES
[facility]  [address] [facility] [address]  [phone]  Dept: [phone]    PRE-OP EVALUATION:  Today's date: 2018    Antonio Ramirez (: 1945) presents for pre-operative evaluation assessment as requested by Dr. Hunter, Jose Wright MD.  He requires evaluation and anesthesia risk assessment prior to undergoing surgery/procedure for treatment of calculus of the ureters.    Proposed Surgery/ Procedure: COMBINED CYSTOSCOPY, RETROGRADES, EXCHANGE STENT URETER(S)  Date of Surgery/ Procedure: 2018  Time of Surgery/ Procedure: 12:50 PM  Hospital/Surgical Facility:  OR  Fax number for surgical facility: [phone]  Primary Physician: aMin Hardy  Type of Anesthesia Anticipated: General    Patient has a Health Care Directive or Living Will:  YES on file 2018    1. YES - DO YOU HAVE A HISTORY OF HEART ATTACK, STROKE, STENT, BYPASS OR SURGERY ON AN ARTERY IN THE HEAD, NECK, HEART OR LEG? Bypass , left leg amputation  2. NO - Do you ever have any pain or discomfort in your chest?  3. NO - Do you have a history of  Heart Failure?  4. NO - Are you troubled by shortness of breath when: walking on the level, up a slight hill or at night?  5. NO - Do you currently have a cold, bronchitis or other respiratory infection?  6. NO - Do you have a cough, shortness of breath or wheezing?  7. NO - Do you sometimes get pains in the calves of your legs when you walk?  8. YES - DO YOU OR ANYONE IN YOUR FAMILY HAVE PREVIOUS HISTORY OF BLOOD CLOTS? Self (PE)  9. NO - Do you or does anyone in your family have a serious bleeding problem such as prolonged bleeding following surgeries or cuts?  10. YES - HAVE YOU EVER HAD PROBLEMS WITH ANEMIA OR BEEN TOLD TO TAKE IRON PILLS? Recent iron infusions, malabsorption of iron  11. NO - Have you had any abnormal blood loss such as black, tarry or bloody stools, or abnormal vaginal bleeding?  12. YES - HAVE YOU EVER HAD A BLOOD TRANSFUSION?  "various  13. NO - Have you or any of your relatives ever had problems with anesthesia?  14. NO - Do you have sleep apnea, excessive snoring or daytime drowsiness?  15. NO - Do you have any prosthetic heart valves?  16. YES - DO YOU HAVE PROSTHETIC JOINTS? R TKA. L TOBI  17. NO - Is there any chance that you may be pregnant?      HPI:     HPI related to upcoming procedure: Recent ureteral stones. Currently has \"marla\" colored urine. No back pain. No dysuria.       See problem list for active medical problems.  Problems all longstanding and stable, except as noted/documented.  See ROS for pertinent symptoms related to these conditions.                                                                                                                                                          .    MEDICAL HISTORY:     Patient Active Problem List    Diagnosis Date Noted     Malabsorption of iron 06/08/2018     Priority: Medium     Benign essential hypertension 04/27/2018     Priority: Medium     S/P craniotomy 04/07/2018     Priority: Medium     Subdural hematoma (H) 04/07/2018     Priority: Medium     Paroxysmal a-fib (H) 11/29/2017     Priority: Medium     Thrombocytopenia -- suspect related to alcohol use 11/29/2017     Priority: Medium     Chronic congestive heart failure, unspecified congestive heart failure type (H) 06/30/2017     Priority: Medium     Prostate cancer -- S/P Radiative Seed implants in 2000 (no problems since) 05/24/2017     Priority: Medium     Antiphospholipid Jina Syn, Hypercoag (DVT, PE) -- has IVC filt and Warfarin 05/24/2017     Priority: Medium     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 (H) 05/24/2017     Priority: Medium     Thoracic aortic aneurysm without rupture (H) 05/24/2017     Priority: Medium     Alcohol abuse 09/24/2014     Priority: Medium     Problem list name updated by automated process. Provider to review       Alcoholic cirrhosis of liver (H) 09/24/2014     Priority: Medium "     Chronic kidney disease, stage III (moderate) 09/24/2014     Priority: Medium     Physical deconditioning 09/24/2014     Priority: Medium      Past Medical History:   Diagnosis Date     Alcoholism (H)      Amputated left leg (H)      Anemia      Anticardiolipin antibody syndrome (H)      Antiplatelet or antithrombotic long-term use      Aortic stenosis      Arthritis      Balance problem      Cardiomyopathy (H)      Coagulation disorder (H)     cardiolipinantibody syndrome     Coronary artery disease      Gastro-oesophageal reflux disease      Heart attack 2007    apparently arrested, cardiac X5     Hiatal hernia      Hypercholesterolemia      Hypertension      Left foot drop      Liver disease     alcoholic liver disease, alcoholic cirrohsosis, portal hypertension     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)     ?When diagnosed, patient not aware of this     Moderate mitral regurgitation      Monoclonal gammopathy      Neuropathy      Noninfectious ileitis     diverticulitis of colon     PE (pulmonary embolism) 2006     Prostate cancer (H) 2000    Treated with radiation (seed implants in 2000) -- no problems since     Renal disease     kidney stones     Syncope      Thoracic aortic aneurysm, without rupture      Thrombocytopenia (H)      Thrombosis of leg      Urethral stricture      Past Surgical History:   Procedure Laterality Date     AMPUTATE LEG BELOW KNEE Left 04/2014    related to gangrene, started as foot ulcer related to neuropathy     AMPUTATE LEG BELOW KNEE Left 12/4/2017    Procedure: AMPUTATE LEG BELOW KNEE;  Exploration of Left Leg Below Knee Amputation Stump with Ligation of Bleeders.;  Surgeon: Leandro Arreola MD;  Location:  OR     APPENDECTOMY       APPLY WOUND VAC Left 12/6/2017    Procedure: APPLY WOUND VAC;;  Surgeon: Saima Howard MD;  Location:  [facility]     ARTHROPLASTY KNEE Right 9/19/2014    Procedure: ARTHROPLASTY KNEE;  Surgeon: Saima Howard MD;  Location:  OR      CARDIAC SURGERY      CABG     CHOLECYSTECTOMY       COLONOSCOPY       CRANIOTOMY Right 4/7/2018    Procedure: CRANIOTOMY;  Right Craniotomy For Subdural Hematoma;  Surgeon: Jono Issa MD;  Location:  OR     CYSTOSCOPY, DILATE URETHRA, COMBINED N/A 10/12/2016    Procedure: COMBINED CYSTOSCOPY, DILATE URETHRA;  Surgeon: Dimitry Bello MD;  Location:  OR     ENDOSCOPIC STRIPPING VEIN(S)       esophagogastroduodenoscopy       EYE SURGERY      cataracts     GENITOURINARY SURGERY      Prostate Seen Implants     HERNIA REPAIR      Umbilical     INCISION AND DRAINAGE LOWER EXTREMITY, COMBINED Left 12/1/2017    Procedure: COMBINED INCISION AND DRAINAGE LOWER EXTREMITY;  INCISION AND DRAINAGE OF LEFT BELOW KNEE AMPUTATION ABSCESS, WOUND VAC PLACEMENT;  Surgeon: Saima Howard MD;  Location:  OR     IR IVC FILTER PLACEMENT       IRRIGATION AND DEBRIDEMENT LOWER EXTREMITY, COMBINED Left 12/6/2017    Procedure: COMBINED IRRIGATION AND DEBRIDEMENT LOWER EXTREMITY;  IRRIGATION AND DEBRIDEMENT OF LEFT BELOW KNEE AMPUTATION SITE WITH WOUND VAC REPLACEMENT;  Surgeon: Saima Howard MD;  Location: [facility]     IRRIGATION AND DEBRIDEMENT LOWER EXTREMITY, COMBINED Left 12/8/2017    Procedure: COMBINED IRRIGATION AND DEBRIDEMENT LOWER EXTREMITY;  IRRIGATION AND DEBRIDEMENT OF LEFT BELOW THE KNEE AMPUTATION AND SHORTENING OF THE TIBIA WITH WOUND CLOSURE;  Surgeon: Saima Howard MD;  Location:  OR     LASER HOLMIUM LITHOTRIPSY URETER(S), INSERT STENT, COMBINED Bilateral 6/28/2018    Procedure: COMBINED CYSTOSCOPY, URETEROSCOPY, LASER HOLMIUM LITHOTRIPSY URETER(S), INSERT STENT;  CYSTOSCOPY, BILATERAL URETEROSCOPY,  HOLMIUM LASER LITHOTRIPSY, BILATERAL STENT PLACEMENT, STONE BASKETING;  Surgeon: Jose Hunter MD;  Location:  OR     ORTHOPEDIC SURGERY      (R) knee scope     ORTHOPEDIC SURGERY      total hip      ORTHOPEDIC SURGERY      elbow surgery     Current Outpatient Prescriptions  "  Medication Sig Dispense Refill     ASPIRIN NOT PRESCRIBED (INTENTIONAL) Please choose reason not prescribed, below 0 each 0     carvedilol (COREG) 3.125 MG tablet Take 1 tablet (3.125 mg) by mouth 2 times daily (with meals) 180 tablet 1     fluticasone (FLONASE) 50 MCG/ACT spray Spray 2 sprays into both nostrils daily       folic acid (FOLVITE) 1 MG tablet Take 1 tablet (1 mg) by mouth daily 30 tablet      gabapentin (NEURONTIN) 600 MG tablet Take 1 tablet (600 mg) by mouth 3 times daily 270 tablet 3     HYDROcodone-acetaminophen (NORCO) 5-325 MG per tablet Take 1-2 tablets by mouth every 4 hours as needed for severe pain (Moderate to Severe Pain) 20 tablet 0     oxyCODONE IR (ROXICODONE) 5 MG tablet Take 1 tablet (5 mg) by mouth every 6 hours as needed for other (pain control or improvement in physical function. Hold dose for analgesic side effects.) 6 tablet 0     phenazopyridine (PYRIDIUM) 100 MG tablet Take 1 tablet (100 mg) by mouth 3 times daily as needed for urinary tract discomfort 90 tablet 3     rivaroxaban ANTICOAGULANT (XARELTO) 10 MG TABS tablet Take 1 tablet (10 mg) by mouth daily (with dinner)       thiamine 100 MG tablet Take 1 tablet (100 mg) by mouth daily       torsemide (DEMADEX) 20 MG tablet Take 20 mg by mouth daily       OTC products: None, except as noted above    Allergies   Allergen Reactions     Ciprofloxacin Itching     Severe itching \"like ants were crawling\"     Hmg-Coa-R Inhibitors Other (See Comments)     Rhabdomyolysis occurred within a couple days      Latex Allergy: NO    Social History   Substance Use Topics     Smoking status: Never Smoker     Smokeless tobacco: Never Used     Alcohol use Yes      Comment: quit 10/2015; now 5-6 Vodkas/night     History   Drug Use No       REVIEW OF SYSTEMS:   Constitutional, neuro, ENT, endocrine, pulmonary, cardiac, gastrointestinal, genitourinary, musculoskeletal, integument and psychiatric systems are negative, except as otherwise " "noted.    EXAM:   /57 (BP Location: Right arm, Cuff Size: Adult Regular)  Pulse 55  Temp 98.3  F (36.8  C) (Tympanic)  Ht 6' 2\" (1.88 m)  Wt 210 lb (95.3 kg)  SpO2 97%  BMI 26.96 kg/m2    GENERAL APPEARANCE: healthy, alert and no distress     EYES: EOMI,  PERRL     HENT: ear canals and TM's normal and nose and mouth without ulcers or lesions     NECK: no adenopathy, no asymmetry, masses, or scars and thyroid normal to palpation     RESP: lungs clear to auscultation - no rales, rhonchi or wheezes     CV: Bradycardia. Significant holosystolic murmur.     MS: extremities normal- no gross deformities noted, no evidence of inflammation in joints, FROM in all extremities.     SKIN: no suspicious lesions or rashes. Scattered bruising.     NEURO: Normal strength and tone, sensory exam grossly normal, mentation intact and speech normal     PSYCH: mentation appears normal. and affect normal/bright     LYMPHATICS: No cervical adenopathy     DIAGNOSTICS:   None completed as pt declined. Recent lab work is stable.     Recent Labs   Lab Test  05/27/18   0608  05/25/18   0707  05/24/18   0700   05/18/18   1641   04/11/18   0807   HGB  8.9*  8.9*  9.0*   < >  10.6*   < >  10.5*   PLT  106*  76*  68*   < >  168   < >  156   INR   --    --   1.68*   --    --    --   1.39*   NA  137  139  138   < >  140   < >  140   POTASSIUM  4.7  4.1  4.0   < >  4.3   < >  2.9*   CR  1.23  1.40*  1.57*   < >  1.00   < >  0.88   A1C   --    --    --    --   4.6   --    --     < > = values in this interval not displayed.        IMPRESSION:   Reason for surgery/procedure: ureteral stent  Diagnosis/reason for consult: medical preop    The proposed surgical procedure is considered INTERMEDIATE risk.    REVISED CARDIAC RISK INDEX  The patient has the following serious cardiovascular risks for perioperative complications such as (MI, PE, VFib and 3  AV Block):  Coronary Artery Disease (MI, positive stress test, angina, Qs on " EKG)  INTERPRETATION: 1 risks: Class II (low risk - 0.9% complication rate)    The patient has the following additional risks for perioperative complications:  Alcohol abuse with risk of withdrawal       ICD-10-CM    1. Preop general physical exam Z01.818    2. Calculus of ureter N20.1        RECOMMENDATIONS:     --Consult hospital rounder / IM to assist post-op medical management    Anemia  Anemia and does not require treatment prior to surgery.  Monitor Hemoglobin postoperatively.  Stable hemoglobin       --Patient is to take all scheduled medications on the day of surgery EXCEPT for modifications listed below.    Anticoagulant or Antiplatelet Medication Use  XARELTO: Bleeding risk is at least moderate for this procedure and Dabigatran (Xarelto) should be stopped at least 24 hours prior to procedure. (Dr Fernandes is ok with 5 days) Restart as soon as possible.      Pt declined blood work today. Recent labs are stable         APPROVAL GIVEN to proceed with proposed procedure, without further diagnostic evaluation       Signed Electronically by: DIPAK Posada CNP    Copy of this evaluation report is provided to requesting physician.    [facility] Preop Guidelines    Revised Cardiac Risk Index

## 2018-07-20 NOTE — H&P (VIEW-ONLY)
[facility]  [address] [facility] [address]  [phone]  Dept: [phone]    PRE-OP EVALUATION:  Today's date: 2018    Antonio Ramirez (: 1945) presents for pre-operative evaluation assessment as requested by Dr. Hunter, Jose Wright MD.  He requires evaluation and anesthesia risk assessment prior to undergoing surgery/procedure for treatment of calculus of the ureters.    Proposed Surgery/ Procedure: COMBINED CYSTOSCOPY, RETROGRADES, EXCHANGE STENT URETER(S)  Date of Surgery/ Procedure: 2018  Time of Surgery/ Procedure: 12:50 PM  Hospital/Surgical Facility:  OR  Fax number for surgical facility: [phone]  Primary Physician: Main Hardy  Type of Anesthesia Anticipated: General    Patient has a Health Care Directive or Living Will:  YES on file 2018    1. YES - DO YOU HAVE A HISTORY OF HEART ATTACK, STROKE, STENT, BYPASS OR SURGERY ON AN ARTERY IN THE HEAD, NECK, HEART OR LEG? Bypass , left leg amputation  2. NO - Do you ever have any pain or discomfort in your chest?  3. NO - Do you have a history of  Heart Failure?  4. NO - Are you troubled by shortness of breath when: walking on the level, up a slight hill or at night?  5. NO - Do you currently have a cold, bronchitis or other respiratory infection?  6. NO - Do you have a cough, shortness of breath or wheezing?  7. NO - Do you sometimes get pains in the calves of your legs when you walk?  8. YES - DO YOU OR ANYONE IN YOUR FAMILY HAVE PREVIOUS HISTORY OF BLOOD CLOTS? Self (PE)  9. NO - Do you or does anyone in your family have a serious bleeding problem such as prolonged bleeding following surgeries or cuts?  10. YES - HAVE YOU EVER HAD PROBLEMS WITH ANEMIA OR BEEN TOLD TO TAKE IRON PILLS? Recent iron infusions, malabsorption of iron  11. NO - Have you had any abnormal blood loss such as black, tarry or bloody stools, or abnormal vaginal bleeding?  12. YES - HAVE YOU EVER HAD A BLOOD TRANSFUSION?  "various  13. NO - Have you or any of your relatives ever had problems with anesthesia?  14. NO - Do you have sleep apnea, excessive snoring or daytime drowsiness?  15. NO - Do you have any prosthetic heart valves?  16. YES - DO YOU HAVE PROSTHETIC JOINTS? R TKA. L TOBI  17. NO - Is there any chance that you may be pregnant?      HPI:     HPI related to upcoming procedure: Recent ureteral stones. Currently has \"marla\" colored urine. No back pain. No dysuria.       See problem list for active medical problems.  Problems all longstanding and stable, except as noted/documented.  See ROS for pertinent symptoms related to these conditions.                                                                                                                                                          .    MEDICAL HISTORY:     Patient Active Problem List    Diagnosis Date Noted     Malabsorption of iron 06/08/2018     Priority: Medium     Benign essential hypertension 04/27/2018     Priority: Medium     S/P craniotomy 04/07/2018     Priority: Medium     Subdural hematoma (H) 04/07/2018     Priority: Medium     Paroxysmal a-fib (H) 11/29/2017     Priority: Medium     Thrombocytopenia -- suspect related to alcohol use 11/29/2017     Priority: Medium     Chronic congestive heart failure, unspecified congestive heart failure type (H) 06/30/2017     Priority: Medium     Prostate cancer -- S/P Radiative Seed implants in 2000 (no problems since) 05/24/2017     Priority: Medium     Antiphospholipid Jina Syn, Hypercoag (DVT, PE) -- has IVC filt and Warfarin 05/24/2017     Priority: Medium     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 (H) 05/24/2017     Priority: Medium     Thoracic aortic aneurysm without rupture (H) 05/24/2017     Priority: Medium     Alcohol abuse 09/24/2014     Priority: Medium     Problem list name updated by automated process. Provider to review       Alcoholic cirrhosis of liver (H) 09/24/2014     Priority: Medium "     Chronic kidney disease, stage III (moderate) 09/24/2014     Priority: Medium     Physical deconditioning 09/24/2014     Priority: Medium      Past Medical History:   Diagnosis Date     Alcoholism (H)      Amputated left leg (H)      Anemia      Anticardiolipin antibody syndrome (H)      Antiplatelet or antithrombotic long-term use      Aortic stenosis      Arthritis      Balance problem      Cardiomyopathy (H)      Coagulation disorder (H)     cardiolipinantibody syndrome     Coronary artery disease      Gastro-oesophageal reflux disease      Heart attack 2007    apparently arrested, cardiac X5     Hiatal hernia      Hypercholesterolemia      Hypertension      Left foot drop      Liver disease     alcoholic liver disease, alcoholic cirrohsosis, portal hypertension     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)     ?When diagnosed, patient not aware of this     Moderate mitral regurgitation      Monoclonal gammopathy      Neuropathy      Noninfectious ileitis     diverticulitis of colon     PE (pulmonary embolism) 2006     Prostate cancer (H) 2000    Treated with radiation (seed implants in 2000) -- no problems since     Renal disease     kidney stones     Syncope      Thoracic aortic aneurysm, without rupture      Thrombocytopenia (H)      Thrombosis of leg      Urethral stricture      Past Surgical History:   Procedure Laterality Date     AMPUTATE LEG BELOW KNEE Left 04/2014    related to gangrene, started as foot ulcer related to neuropathy     AMPUTATE LEG BELOW KNEE Left 12/4/2017    Procedure: AMPUTATE LEG BELOW KNEE;  Exploration of Left Leg Below Knee Amputation Stump with Ligation of Bleeders.;  Surgeon: Leandro Arreola MD;  Location:  OR     APPENDECTOMY       APPLY WOUND VAC Left 12/6/2017    Procedure: APPLY WOUND VAC;;  Surgeon: Saima Howard MD;  Location:  [facility]     ARTHROPLASTY KNEE Right 9/19/2014    Procedure: ARTHROPLASTY KNEE;  Surgeon: Saima Howard MD;  Location:  OR      CARDIAC SURGERY      CABG     CHOLECYSTECTOMY       COLONOSCOPY       CRANIOTOMY Right 4/7/2018    Procedure: CRANIOTOMY;  Right Craniotomy For Subdural Hematoma;  Surgeon: Jono Issa MD;  Location:  OR     CYSTOSCOPY, DILATE URETHRA, COMBINED N/A 10/12/2016    Procedure: COMBINED CYSTOSCOPY, DILATE URETHRA;  Surgeon: Dimitry Bello MD;  Location:  OR     ENDOSCOPIC STRIPPING VEIN(S)       esophagogastroduodenoscopy       EYE SURGERY      cataracts     GENITOURINARY SURGERY      Prostate Seen Implants     HERNIA REPAIR      Umbilical     INCISION AND DRAINAGE LOWER EXTREMITY, COMBINED Left 12/1/2017    Procedure: COMBINED INCISION AND DRAINAGE LOWER EXTREMITY;  INCISION AND DRAINAGE OF LEFT BELOW KNEE AMPUTATION ABSCESS, WOUND VAC PLACEMENT;  Surgeon: Saima Howard MD;  Location:  OR     IR IVC FILTER PLACEMENT       IRRIGATION AND DEBRIDEMENT LOWER EXTREMITY, COMBINED Left 12/6/2017    Procedure: COMBINED IRRIGATION AND DEBRIDEMENT LOWER EXTREMITY;  IRRIGATION AND DEBRIDEMENT OF LEFT BELOW KNEE AMPUTATION SITE WITH WOUND VAC REPLACEMENT;  Surgeon: Saima Howard MD;  Location: [facility]     IRRIGATION AND DEBRIDEMENT LOWER EXTREMITY, COMBINED Left 12/8/2017    Procedure: COMBINED IRRIGATION AND DEBRIDEMENT LOWER EXTREMITY;  IRRIGATION AND DEBRIDEMENT OF LEFT BELOW THE KNEE AMPUTATION AND SHORTENING OF THE TIBIA WITH WOUND CLOSURE;  Surgeon: Saima Howard MD;  Location:  OR     LASER HOLMIUM LITHOTRIPSY URETER(S), INSERT STENT, COMBINED Bilateral 6/28/2018    Procedure: COMBINED CYSTOSCOPY, URETEROSCOPY, LASER HOLMIUM LITHOTRIPSY URETER(S), INSERT STENT;  CYSTOSCOPY, BILATERAL URETEROSCOPY,  HOLMIUM LASER LITHOTRIPSY, BILATERAL STENT PLACEMENT, STONE BASKETING;  Surgeon: Jose Hunter MD;  Location:  OR     ORTHOPEDIC SURGERY      (R) knee scope     ORTHOPEDIC SURGERY      total hip      ORTHOPEDIC SURGERY      elbow surgery     Current Outpatient Prescriptions  "  Medication Sig Dispense Refill     ASPIRIN NOT PRESCRIBED (INTENTIONAL) Please choose reason not prescribed, below 0 each 0     carvedilol (COREG) 3.125 MG tablet Take 1 tablet (3.125 mg) by mouth 2 times daily (with meals) 180 tablet 1     fluticasone (FLONASE) 50 MCG/ACT spray Spray 2 sprays into both nostrils daily       folic acid (FOLVITE) 1 MG tablet Take 1 tablet (1 mg) by mouth daily 30 tablet      gabapentin (NEURONTIN) 600 MG tablet Take 1 tablet (600 mg) by mouth 3 times daily 270 tablet 3     HYDROcodone-acetaminophen (NORCO) 5-325 MG per tablet Take 1-2 tablets by mouth every 4 hours as needed for severe pain (Moderate to Severe Pain) 20 tablet 0     oxyCODONE IR (ROXICODONE) 5 MG tablet Take 1 tablet (5 mg) by mouth every 6 hours as needed for other (pain control or improvement in physical function. Hold dose for analgesic side effects.) 6 tablet 0     phenazopyridine (PYRIDIUM) 100 MG tablet Take 1 tablet (100 mg) by mouth 3 times daily as needed for urinary tract discomfort 90 tablet 3     rivaroxaban ANTICOAGULANT (XARELTO) 10 MG TABS tablet Take 1 tablet (10 mg) by mouth daily (with dinner)       thiamine 100 MG tablet Take 1 tablet (100 mg) by mouth daily       torsemide (DEMADEX) 20 MG tablet Take 20 mg by mouth daily       OTC products: None, except as noted above    Allergies   Allergen Reactions     Ciprofloxacin Itching     Severe itching \"like ants were crawling\"     Hmg-Coa-R Inhibitors Other (See Comments)     Rhabdomyolysis occurred within a couple days      Latex Allergy: NO    Social History   Substance Use Topics     Smoking status: Never Smoker     Smokeless tobacco: Never Used     Alcohol use Yes      Comment: quit 10/2015; now 5-6 Vodkas/night     History   Drug Use No       REVIEW OF SYSTEMS:   Constitutional, neuro, ENT, endocrine, pulmonary, cardiac, gastrointestinal, genitourinary, musculoskeletal, integument and psychiatric systems are negative, except as otherwise " "noted.    EXAM:   /57 (BP Location: Right arm, Cuff Size: Adult Regular)  Pulse 55  Temp 98.3  F (36.8  C) (Tympanic)  Ht 6' 2\" (1.88 m)  Wt 210 lb (95.3 kg)  SpO2 97%  BMI 26.96 kg/m2    GENERAL APPEARANCE: healthy, alert and no distress     EYES: EOMI,  PERRL     HENT: ear canals and TM's normal and nose and mouth without ulcers or lesions     NECK: no adenopathy, no asymmetry, masses, or scars and thyroid normal to palpation     RESP: lungs clear to auscultation - no rales, rhonchi or wheezes     CV: Bradycardia. Significant holosystolic murmur.     MS: extremities normal- no gross deformities noted, no evidence of inflammation in joints, FROM in all extremities.     SKIN: no suspicious lesions or rashes. Scattered bruising.     NEURO: Normal strength and tone, sensory exam grossly normal, mentation intact and speech normal     PSYCH: mentation appears normal. and affect normal/bright     LYMPHATICS: No cervical adenopathy     DIAGNOSTICS:   None completed as pt declined. Recent lab work is stable.     Recent Labs   Lab Test  05/27/18   0608  05/25/18   0707  05/24/18   0700   05/18/18   1641   04/11/18   0807   HGB  8.9*  8.9*  9.0*   < >  10.6*   < >  10.5*   PLT  106*  76*  68*   < >  168   < >  156   INR   --    --   1.68*   --    --    --   1.39*   NA  137  139  138   < >  140   < >  140   POTASSIUM  4.7  4.1  4.0   < >  4.3   < >  2.9*   CR  1.23  1.40*  1.57*   < >  1.00   < >  0.88   A1C   --    --    --    --   4.6   --    --     < > = values in this interval not displayed.        IMPRESSION:   Reason for surgery/procedure: ureteral stent  Diagnosis/reason for consult: medical preop    The proposed surgical procedure is considered INTERMEDIATE risk.    REVISED CARDIAC RISK INDEX  The patient has the following serious cardiovascular risks for perioperative complications such as (MI, PE, VFib and 3  AV Block):  Coronary Artery Disease (MI, positive stress test, angina, Qs on " EKG)  INTERPRETATION: 1 risks: Class II (low risk - 0.9% complication rate)    The patient has the following additional risks for perioperative complications:  Alcohol abuse with risk of withdrawal       ICD-10-CM    1. Preop general physical exam Z01.818    2. Calculus of ureter N20.1        RECOMMENDATIONS:     --Consult hospital rounder / IM to assist post-op medical management    Anemia  Anemia and does not require treatment prior to surgery.  Monitor Hemoglobin postoperatively.  Stable hemoglobin       --Patient is to take all scheduled medications on the day of surgery EXCEPT for modifications listed below.    Anticoagulant or Antiplatelet Medication Use  XARELTO: Bleeding risk is at least moderate for this procedure and Dabigatran (Xarelto) should be stopped at least 24 hours prior to procedure. (Dr Fernandes is ok with 5 days) Restart as soon as possible.      Pt declined blood work today. Recent labs are stable         APPROVAL GIVEN to proceed with proposed procedure, without further diagnostic evaluation       Signed Electronically by: DIPAK Posada CNP    Copy of this evaluation report is provided to requesting physician.    [facility] Preop Guidelines    Revised Cardiac Risk Index

## 2018-07-23 ENCOUNTER — HOSPITAL ENCOUNTER (INPATIENT)
Facility: CLINIC | Age: 73
LOS: 2 days | Discharge: HOME | DRG: 698 | End: 2018-07-25
Attending: EMERGENCY MEDICINE | Admitting: HOSPITALIST
Payer: MEDICARE

## 2018-07-23 ENCOUNTER — SURGERY (OUTPATIENT)
Age: 73
End: 2018-07-23

## 2018-07-23 ENCOUNTER — APPOINTMENT (OUTPATIENT)
Dept: GENERAL RADIOLOGY | Facility: CLINIC | Age: 73
DRG: 698 | End: 2018-07-23
Attending: EMERGENCY MEDICINE
Payer: MEDICARE

## 2018-07-23 ENCOUNTER — HOSPITAL ENCOUNTER (OUTPATIENT)
Facility: CLINIC | Age: 73
Discharge: STILL ADMITTED OR ADMINISTRATIVE | End: 2018-07-23
Attending: UROLOGY | Admitting: UROLOGY
Payer: MEDICARE

## 2018-07-23 VITALS
DIASTOLIC BLOOD PRESSURE: 55 MMHG | OXYGEN SATURATION: 97 % | TEMPERATURE: 101.1 F | WEIGHT: 209.8 LBS | SYSTOLIC BLOOD PRESSURE: 107 MMHG | BODY MASS INDEX: 26.92 KG/M2 | RESPIRATION RATE: 16 BRPM | HEIGHT: 74 IN

## 2018-07-23 DIAGNOSIS — R65.20 SEVERE SEPSIS (H): Primary | ICD-10-CM

## 2018-07-23 DIAGNOSIS — N10 ACUTE PYELONEPHRITIS: ICD-10-CM

## 2018-07-23 DIAGNOSIS — R53.81 PHYSICAL DECONDITIONING: ICD-10-CM

## 2018-07-23 DIAGNOSIS — A41.9 SEVERE SEPSIS (H): Primary | ICD-10-CM

## 2018-07-23 DIAGNOSIS — T83.592A: ICD-10-CM

## 2018-07-23 LAB
ALBUMIN UR-MCNC: 100 MG/DL
ANION GAP SERPL CALCULATED.3IONS-SCNC: 8 MMOL/L (ref 3–14)
APPEARANCE UR: ABNORMAL
BACTERIA #/AREA URNS HPF: ABNORMAL /HPF
BASOPHILS # BLD AUTO: 0 10E9/L (ref 0–0.2)
BASOPHILS NFR BLD AUTO: 0.4 %
BILIRUB UR QL STRIP: NEGATIVE
BUN SERPL-MCNC: 31 MG/DL (ref 7–30)
CALCIUM SERPL-MCNC: 8.1 MG/DL (ref 8.5–10.1)
CHLORIDE SERPL-SCNC: 103 MMOL/L (ref 94–109)
CO2 SERPL-SCNC: 24 MMOL/L (ref 20–32)
COLOR UR AUTO: YELLOW
CREAT SERPL-MCNC: 1.91 MG/DL (ref 0.66–1.25)
DIFFERENTIAL METHOD BLD: ABNORMAL
EOSINOPHIL # BLD AUTO: 0 10E9/L (ref 0–0.7)
EOSINOPHIL NFR BLD AUTO: 0 %
ERYTHROCYTE [DISTWIDTH] IN BLOOD BY AUTOMATED COUNT: 22.8 % (ref 10–15)
GFR SERPL CREATININE-BSD FRML MDRD: 34 ML/MIN/1.7M2
GLUCOSE SERPL-MCNC: 104 MG/DL (ref 70–99)
GLUCOSE UR STRIP-MCNC: NEGATIVE MG/DL
HCT VFR BLD AUTO: 34 % (ref 40–53)
HGB BLD-MCNC: 10.9 G/DL (ref 13.3–17.7)
HGB UR QL STRIP: ABNORMAL
IMM GRANULOCYTES # BLD: 0 10E9/L (ref 0–0.4)
IMM GRANULOCYTES NFR BLD: 0.1 %
KETONES UR STRIP-MCNC: NEGATIVE MG/DL
LACTATE BLD-SCNC: 1.8 MMOL/L (ref 0.7–2)
LACTATE SERPL-SCNC: 2.9 MMOL/L (ref 0.4–2)
LEUKOCYTE ESTERASE UR QL STRIP: ABNORMAL
LYMPHOCYTES # BLD AUTO: 0.3 10E9/L (ref 0.8–5.3)
LYMPHOCYTES NFR BLD AUTO: 3.9 %
MCH RBC QN AUTO: 28.8 PG (ref 26.5–33)
MCHC RBC AUTO-ENTMCNC: 32.1 G/DL (ref 31.5–36.5)
MCV RBC AUTO: 90 FL (ref 78–100)
MONOCYTES # BLD AUTO: 0.6 10E9/L (ref 0–1.3)
MONOCYTES NFR BLD AUTO: 8.8 %
MUCOUS THREADS #/AREA URNS LPF: PRESENT /LPF
NEUTROPHILS # BLD AUTO: 6.2 10E9/L (ref 1.6–8.3)
NEUTROPHILS NFR BLD AUTO: 86.8 %
NITRATE UR QL: NEGATIVE
NRBC # BLD AUTO: 0 10*3/UL
NRBC BLD AUTO-RTO: 0 /100
PH UR STRIP: 6 PH (ref 5–7)
PLATELET # BLD AUTO: 82 10E9/L (ref 150–450)
POTASSIUM SERPL-SCNC: 4.3 MMOL/L (ref 3.4–5.3)
RBC # BLD AUTO: 3.78 10E12/L (ref 4.4–5.9)
RBC #/AREA URNS AUTO: >182 /HPF (ref 0–2)
SODIUM SERPL-SCNC: 135 MMOL/L (ref 133–144)
SOURCE: ABNORMAL
SP GR UR STRIP: 1.01 (ref 1–1.03)
SQUAMOUS #/AREA URNS AUTO: 2 /HPF (ref 0–1)
TRANS CELLS #/AREA URNS HPF: <1 /HPF (ref 0–1)
UROBILINOGEN UR STRIP-MCNC: 2 MG/DL (ref 0–2)
WBC # BLD AUTO: 7.1 10E9/L (ref 4–11)
WBC #/AREA URNS AUTO: >182 /HPF (ref 0–5)
WBC CLUMPS #/AREA URNS HPF: PRESENT /HPF

## 2018-07-23 PROCEDURE — 99207 ZZC CDG-MDM COMPONENT: MEETS MODERATE - UP CODED: CPT | Performed by: HOSPITALIST

## 2018-07-23 PROCEDURE — 25000132 ZZH RX MED GY IP 250 OP 250 PS 637: Mod: GY | Performed by: EMERGENCY MEDICINE

## 2018-07-23 PROCEDURE — 25000128 H RX IP 250 OP 636: Performed by: HOSPITALIST

## 2018-07-23 PROCEDURE — 80048 BASIC METABOLIC PNL TOTAL CA: CPT | Performed by: EMERGENCY MEDICINE

## 2018-07-23 PROCEDURE — 96365 THER/PROPH/DIAG IV INF INIT: CPT

## 2018-07-23 PROCEDURE — 87088 URINE BACTERIA CULTURE: CPT | Performed by: EMERGENCY MEDICINE

## 2018-07-23 PROCEDURE — 25000132 ZZH RX MED GY IP 250 OP 250 PS 637: Mod: GY | Performed by: HOSPITALIST

## 2018-07-23 PROCEDURE — A9270 NON-COVERED ITEM OR SERVICE: HCPCS | Mod: GY | Performed by: EMERGENCY MEDICINE

## 2018-07-23 PROCEDURE — 96361 HYDRATE IV INFUSION ADD-ON: CPT

## 2018-07-23 PROCEDURE — 25000128 H RX IP 250 OP 636: Performed by: EMERGENCY MEDICINE

## 2018-07-23 PROCEDURE — 71046 X-RAY EXAM CHEST 2 VIEWS: CPT

## 2018-07-23 PROCEDURE — 25000132 ZZH RX MED GY IP 250 OP 250 PS 637: Mod: GY | Performed by: INTERNAL MEDICINE

## 2018-07-23 PROCEDURE — 12000000 ZZH R&B MED SURG/OB

## 2018-07-23 PROCEDURE — A9270 NON-COVERED ITEM OR SERVICE: HCPCS | Mod: GY | Performed by: HOSPITALIST

## 2018-07-23 PROCEDURE — 83605 ASSAY OF LACTIC ACID: CPT | Performed by: EMERGENCY MEDICINE

## 2018-07-23 PROCEDURE — 99223 1ST HOSP IP/OBS HIGH 75: CPT | Performed by: HOSPITALIST

## 2018-07-23 PROCEDURE — 87040 BLOOD CULTURE FOR BACTERIA: CPT | Performed by: EMERGENCY MEDICINE

## 2018-07-23 PROCEDURE — 87077 CULTURE AEROBIC IDENTIFY: CPT | Performed by: EMERGENCY MEDICINE

## 2018-07-23 PROCEDURE — 81001 URINALYSIS AUTO W/SCOPE: CPT | Performed by: EMERGENCY MEDICINE

## 2018-07-23 PROCEDURE — 96366 THER/PROPH/DIAG IV INF ADDON: CPT

## 2018-07-23 PROCEDURE — 87800 DETECT AGNT MULT DNA DIREC: CPT | Performed by: EMERGENCY MEDICINE

## 2018-07-23 PROCEDURE — 85025 COMPLETE CBC W/AUTO DIFF WBC: CPT | Performed by: EMERGENCY MEDICINE

## 2018-07-23 PROCEDURE — 99285 EMERGENCY DEPT VISIT HI MDM: CPT | Mod: 25

## 2018-07-23 PROCEDURE — 87086 URINE CULTURE/COLONY COUNT: CPT | Performed by: EMERGENCY MEDICINE

## 2018-07-23 PROCEDURE — 40000882 ZZH CANCELLED SURGERY UP TO 46-60 MINS: Performed by: UROLOGY

## 2018-07-23 PROCEDURE — 87186 SC STD MICRODIL/AGAR DIL: CPT | Performed by: EMERGENCY MEDICINE

## 2018-07-23 PROCEDURE — A9270 NON-COVERED ITEM OR SERVICE: HCPCS | Mod: GY | Performed by: INTERNAL MEDICINE

## 2018-07-23 RX ORDER — TRAZODONE HYDROCHLORIDE 50 MG/1
50 TABLET, FILM COATED ORAL AT BEDTIME
Status: DISCONTINUED | OUTPATIENT
Start: 2018-07-23 | End: 2018-07-25 | Stop reason: HOSPADM

## 2018-07-23 RX ORDER — ACETAMINOPHEN 500 MG
1000 TABLET ORAL ONCE
Status: COMPLETED | OUTPATIENT
Start: 2018-07-23 | End: 2018-07-23

## 2018-07-23 RX ORDER — ASPIRIN 81 MG/1
81 TABLET, CHEWABLE ORAL DAILY
Status: DISCONTINUED | OUTPATIENT
Start: 2018-07-24 | End: 2018-07-24

## 2018-07-23 RX ORDER — PHENAZOPYRIDINE HYDROCHLORIDE 100 MG/1
100 TABLET, FILM COATED ORAL 3 TIMES DAILY PRN
Status: DISCONTINUED | OUTPATIENT
Start: 2018-07-23 | End: 2018-07-25 | Stop reason: HOSPADM

## 2018-07-23 RX ORDER — PIPERACILLIN SODIUM, TAZOBACTAM SODIUM 3; .375 G/15ML; G/15ML
3.38 INJECTION, POWDER, LYOPHILIZED, FOR SOLUTION INTRAVENOUS ONCE
Status: COMPLETED | OUTPATIENT
Start: 2018-07-23 | End: 2018-07-23

## 2018-07-23 RX ORDER — SODIUM CHLORIDE 9 MG/ML
INJECTION, SOLUTION INTRAVENOUS ONCE
Status: COMPLETED | OUTPATIENT
Start: 2018-07-23 | End: 2018-07-23

## 2018-07-23 RX ORDER — GABAPENTIN 600 MG/1
600 TABLET ORAL 3 TIMES DAILY
Status: DISCONTINUED | OUTPATIENT
Start: 2018-07-23 | End: 2018-07-25 | Stop reason: HOSPADM

## 2018-07-23 RX ORDER — NALOXONE HYDROCHLORIDE 0.4 MG/ML
.1-.4 INJECTION, SOLUTION INTRAMUSCULAR; INTRAVENOUS; SUBCUTANEOUS
Status: DISCONTINUED | OUTPATIENT
Start: 2018-07-23 | End: 2018-07-25 | Stop reason: HOSPADM

## 2018-07-23 RX ORDER — FLUTICASONE PROPIONATE 50 MCG
1 SPRAY, SUSPENSION (ML) NASAL 2 TIMES DAILY
Status: DISCONTINUED | OUTPATIENT
Start: 2018-07-23 | End: 2018-07-25 | Stop reason: HOSPADM

## 2018-07-23 RX ORDER — HYDROCODONE BITARTRATE AND ACETAMINOPHEN 5; 325 MG/1; MG/1
1-2 TABLET ORAL EVERY 4 HOURS PRN
Status: DISCONTINUED | OUTPATIENT
Start: 2018-07-23 | End: 2018-07-25 | Stop reason: HOSPADM

## 2018-07-23 RX ORDER — CEFAZOLIN SODIUM 2 G/100ML
2 INJECTION, SOLUTION INTRAVENOUS
Status: DISCONTINUED | OUTPATIENT
Start: 2018-07-23 | End: 2018-07-23 | Stop reason: HOSPADM

## 2018-07-23 RX ORDER — SODIUM CHLORIDE 9 MG/ML
INJECTION, SOLUTION INTRAVENOUS CONTINUOUS
Status: DISCONTINUED | OUTPATIENT
Start: 2018-07-23 | End: 2018-07-25

## 2018-07-23 RX ORDER — PIPERACILLIN SODIUM, TAZOBACTAM SODIUM 3; .375 G/15ML; G/15ML
3.38 INJECTION, POWDER, LYOPHILIZED, FOR SOLUTION INTRAVENOUS EVERY 6 HOURS
Status: DISCONTINUED | OUTPATIENT
Start: 2018-07-23 | End: 2018-07-25

## 2018-07-23 RX ORDER — ONDANSETRON 4 MG/1
4 TABLET, ORALLY DISINTEGRATING ORAL EVERY 6 HOURS PRN
Status: DISCONTINUED | OUTPATIENT
Start: 2018-07-23 | End: 2018-07-25 | Stop reason: HOSPADM

## 2018-07-23 RX ORDER — HYDROMORPHONE HYDROCHLORIDE 2 MG/1
2 TABLET ORAL EVERY 6 HOURS PRN
Status: DISCONTINUED | OUTPATIENT
Start: 2018-07-23 | End: 2018-07-25 | Stop reason: HOSPADM

## 2018-07-23 RX ORDER — ONDANSETRON 2 MG/ML
4 INJECTION INTRAMUSCULAR; INTRAVENOUS EVERY 6 HOURS PRN
Status: DISCONTINUED | OUTPATIENT
Start: 2018-07-23 | End: 2018-07-25 | Stop reason: HOSPADM

## 2018-07-23 RX ORDER — CEFTRIAXONE 1 G/1
1 INJECTION, POWDER, FOR SOLUTION INTRAMUSCULAR; INTRAVENOUS ONCE
Status: COMPLETED | OUTPATIENT
Start: 2018-07-23 | End: 2018-07-23

## 2018-07-23 RX ADMIN — SODIUM CHLORIDE 1000 ML: 9 INJECTION, SOLUTION INTRAVENOUS at 14:12

## 2018-07-23 RX ADMIN — FLUTICASONE PROPIONATE 1 SPRAY: 50 SPRAY, METERED NASAL at 21:14

## 2018-07-23 RX ADMIN — PIPERACILLIN SODIUM,TAZOBACTAM SODIUM 3.38 G: 3; .375 INJECTION, POWDER, FOR SOLUTION INTRAVENOUS at 21:14

## 2018-07-23 RX ADMIN — SODIUM CHLORIDE 1000 ML: 9 INJECTION, SOLUTION INTRAVENOUS at 13:29

## 2018-07-23 RX ADMIN — TRAZODONE HYDROCHLORIDE 50 MG: 50 TABLET, FILM COATED ORAL at 21:14

## 2018-07-23 RX ADMIN — SODIUM CHLORIDE: 9 INJECTION, SOLUTION INTRAVENOUS at 14:48

## 2018-07-23 RX ADMIN — PIPERACILLIN SODIUM,TAZOBACTAM SODIUM 3.38 G: 3; .375 INJECTION, POWDER, FOR SOLUTION INTRAVENOUS at 14:13

## 2018-07-23 RX ADMIN — CEFTRIAXONE SODIUM 1 G: 1 INJECTION, POWDER, FOR SOLUTION INTRAMUSCULAR; INTRAVENOUS at 15:10

## 2018-07-23 RX ADMIN — ACETAMINOPHEN 1000 MG: 500 TABLET, FILM COATED ORAL at 15:26

## 2018-07-23 RX ADMIN — GABAPENTIN 600 MG: 600 TABLET, FILM COATED ORAL at 21:14

## 2018-07-23 ASSESSMENT — ENCOUNTER SYMPTOMS
COUGH: 1
DIFFICULTY URINATING: 1
FEVER: 1
FATIGUE: 1
CHILLS: 1
WEAKNESS: 0

## 2018-07-23 ASSESSMENT — ACTIVITIES OF DAILY LIVING (ADL)
ADLS_ACUITY_SCORE: 11
AMBULATION: 1-->ASSISTIVE EQUIPMENT
TRANSFERRING: 0-->INDEPENDENT
NUMBER_OF_TIMES_PATIENT_HAS_FALLEN_WITHIN_LAST_SIX_MONTHS: 1
DRESS: 0-->INDEPENDENT
RETIRED_EATING: 0-->INDEPENDENT
TOILETING: 0-->INDEPENDENT
FALL_HISTORY_WITHIN_LAST_SIX_MONTHS: YES
RETIRED_COMMUNICATION: 0-->UNDERSTANDS/COMMUNICATES WITHOUT DIFFICULTY
SWALLOWING: 0-->SWALLOWS FOODS/LIQUIDS WITHOUT DIFFICULTY
WHICH_OF_THE_ABOVE_FUNCTIONAL_RISKS_HAD_A_RECENT_ONSET_OR_CHANGE?: AMBULATION
COGNITION: 0 - NO COGNITION ISSUES REPORTED
BATHING: 0-->INDEPENDENT

## 2018-07-23 NOTE — PHARMACY-ADMISSION MEDICATION HISTORY
Admission medication history interview status for the 7/23/2018  admission is complete. See EPIC admission navigator for prior to admission medications     Medication history source reliability:Good    Actions taken by pharmacist (provider contacted, etc):interviewed pt      Additional medication history information not noted on PTA med list :Pt states he has not taken his torsemide in weeks - states he does not need it - Please review med list and remove if indicated    Medication reconciliation/reorder completed by provider prior to medication history? No    Time spent in this activity: 15 minutes    Prior to Admission medications    Medication Sig Last Dose Taking? Auth Provider   aspirin 81 MG tablet Take 81 mg by mouth daily 7/22/2018 at Unknown time Yes Reported, Patient   carvedilol (COREG) 3.125 MG tablet Take 1 tablet (3.125 mg) by mouth 2 times daily (with meals) 7/22/2018 at Unknown time Yes Main Hardy MD   fluticasone (FLONASE) 50 MCG/ACT spray Spray 1 spray into both nostrils 2 times daily  7/22/2018 at Unknown time Yes Reported, Patient   gabapentin (NEURONTIN) 600 MG tablet Take 1 tablet (600 mg) by mouth 3 times daily 7/22/2018 at Unknown time Yes Main Hardy MD   HYDROcodone-acetaminophen (NORCO) 5-325 MG per tablet Take 1-2 tablets by mouth every 4 hours as needed for severe pain (Moderate to Severe Pain) PRN Yes Jose Hunter MD   HYDROMORPHONE HCL PO Take 2 mg by mouth every 6 hours as needed for moderate to severe pain PRN Yes Unknown, Entered By History   oxyCODONE IR (ROXICODONE) 5 MG tablet Take 1 tablet (5 mg) by mouth every 6 hours as needed for other (pain control or improvement in physical function. Hold dose for analgesic side effects.) PRN Yes Omaira Coleman DO   phenazopyridine (PYRIDIUM) 100 MG tablet Take 1 tablet (100 mg) by mouth 3 times daily as needed for urinary tract discomfort PRN Yes Main Hardy MD   rivaroxaban ANTICOAGULANT (XARELTO) 10 MG  TABS tablet Take 1 tablet (10 mg) by mouth daily (with dinner) 7/22/2018 at Unknown time Yes Main Hardy MD   torsemide (DEMADEX) 20 MG tablet Take 20 mg by mouth daily past month  Unknown, Entered By History

## 2018-07-23 NOTE — OR NURSING
Pt admits C/O temps X 3 days , inability to completely void X 3 days, inability to reach Dr Elsa Hunter notified, surgery cancelled, contact pt's primary doctor, Dr Hardy notified

## 2018-07-23 NOTE — IP AVS SNAPSHOT
MRN:[id number]                      After Visit Summary   7/23/2018    Antonio Ramirez    MRN: [id number]           Thank you!     Thank you for choosing [facility] for your care. Our goal is always to provide you with excellent care. Hearing back from our patients is one way we can continue to improve our services. Please take a few minutes to complete the written survey that you may receive in the mail after you visit with us. Thank you!        Patient Information     Date Of Birth          1945        Designated Caregiver       Most Recent Value    Caregiver    Will someone help with your care after discharge? no      About your hospital stay     You were admitted on:  July 23, 2018 You last received care in the:  [facility] [address] [facility]    You were discharged on:  July 25, 2018        Reason for your hospital stay       You were hospitalized for further evaluation and treatment of an infection within your urinary tract with subsequent bactere                  Who to Call     For medical emergencies, please call 911.  For non-urgent questions about your medical care, please call your primary care provider or clinic, [phone]  For questions related to your surgery, please call your surgery clinic        Attending Provider     Provider Dony Ruiz MD Emergency Medicine    [facility], [place],  HOSPITALIST    Elian Best MD Internal Medicine       Primary Care Provider Office Phone # Fax #    Main Hardy [phone]      After Care Instructions     Activity       Your activity upon discharge: activity as tolerated            Diet       Follow this diet upon discharge: Regular diet            Discharge Instructions       1) Follow-up with your primary care provider in the next week as scheduled to discuss hospitalization and recheck a creatinine while on antibiotics   2) Discharged with Bactrim DS to take twice daily  for 3 weeks   3) Urology intervention scheduled for August 14th  4) Hold aspirin and Xarelto for 7 days prior to procedure                  Follow-up Appointments     Follow-up and recommended labs and tests        Follow up with Dr. Hunter. You are scheduled for surgery on 8/14 at 1pm at [facility].     PLEASE STOP TAKING XARELTO 1 WEEK PRIOR TO SURGERY    [facility] ([facility])  [address], [address]  [place]. [address]  You may call [phone] with any questions or concerns.   Central Appointment #: [phone]            Follow-up and recommended labs and tests        Follow up with primary care provider, Main Hardy, as scheduled for you on Monday 7/30/18 at 2:30pm for hospital follow- up.  The following labs/tests are recommended: Creatinine.                  Your next 10 appointments already scheduled     Jul 30, 2018  2:30 PM CDT   [facility] with Main Hardy [facility]   [facility]    [address] [address] [phone]           Bring a current list of meds and any records pertaining to this visit. For Physicals, please bring immunization records and any forms needing to be filled out. Please arrive 10 minutes early to complete paperwork.            Aug 14, 2018   Procedure with Jose Hunter MD   [facility] (--)    [address]  [facility] [address]   [phone]            Aug 31, 2018   Procedure with Saima Howard MD   [facility] (--)    [address]  [facility] [address]   [phone]            Oct 02, 2018 11:15 AM CDT   New Visit with Elena Downs MD   [facility] ([facility])    [address]  [facility] [address] [phone] OPT 2              Additional Services     MED THERAPY MANAGE REFERRAL        "Patient has diagnosis of Diabetes, Heart Failure, COPD, or AMI and is on more than 5 medications            Physical Therapy Referral       *This therapy referral will be filtered to a centralized scheduling office at [facility] and the patient will receive a call to schedule an appointment at a [facility] location most convenient for them. *     [facility] provides Physical Therapy evaluation and treatment and many specialty services across the [facility] system.  If requesting a specialty program, please choose from the list below.    If you have not heard from the scheduling office within 2 business days, please call [phone] for all locations, with the exception of [place], please call [phone] and [facility], please call [phone]  Treatment: Evaluation & Treatment  Special Instructions/Modalities: None  Special Programs: None    Please be aware that coverage of these services is subject to the terms and limitations of your health insurance plan.  Call member services at your health plan with any benefit or coverage questions.      **Note to Provider:  If you are referring outside of [facility] for the therapy appointment, please list the name of the location in the \"special instructions\" above, print the referral and give to the patient to schedule the appointment.                  Pending Results     Date and Time Order Name Status Description    7/24/2018 1409 EKG 12-lead, tracing only Preliminary     7/24/2018 1201 Blood culture Preliminary     7/23/2018 1320 Blood culture Preliminary     7/23/2018 1320 Blood culture Preliminary             Statement of Approval     Ordered          07/25/18 1452  I have reviewed and agree with all the recommendations and orders detailed in this document.  EFFECTIVE NOW     Approved and electronically signed by:  Emely Ching PA-C             Admission Information     Date & Time Provider Department " "Dept. Phone    7/23/2018 Elian Best MD [facility] [address] [facility] [phone]      Your Vitals Were     Blood Pressure Pulse Temperature Respirations Height Weight    120/73 (BP Location: Right arm) 72 97.6  F (36.4  C) (Oral) 14 1.88 m (6' 2\") 99.2 kg (218 lb 11.1 oz)    Pulse Oximetry BMI (Body Mass Index)                96% 28.08 kg/m2          [employer] Information     [employer] gives you secure access to your electronic health record. If you see a primary care provider, you can also send messages to your care team and make appointments. If you have questions, please call your primary care clinic.  If you do not have a primary care provider, please call [phone] and they will assist you.        Care EveryWhere ID     This is your Care EveryWhere ID. This could be used by other organizations to access your [facility] medical records  [id number]        Equal Access to Services     ANTHONY DORANTES : Evelin Cabrera, watamika grande, qaybta kaalmadenise galloway, igor miller . So [facility] [phone].    ATENCIÓN: Si habla español, tiene a shrestha disposición servicios gratuitos de asistencia lingüística. Llame al [phone].    We comply with applicable federal civil rights laws and [place] laws. We do not discriminate on the basis of race, color, national origin, age, disability, sex, sexual orientation, or gender identity.               Review of your medicines      START taking        Dose / Directions    sulfamethoxazole-trimethoprim 800-160 MG per tablet   Commonly known as:  BACTRIM DS/SEPTRA DS   Used for:  Severe sepsis (H), Infection associated with indwelling ureteral stent, initial encounter (H)        Dose:  1 tablet   Take 1 tablet by mouth 2 times daily for 21 days   Quantity:  42 tablet   Refills:  0         CONTINUE these medicines which have NOT CHANGED        Dose / Directions    aspirin 81 MG tablet        Dose:  81 mg   Take 81 mg by " mouth daily   Refills:  0       carvedilol 3.125 MG tablet   Commonly known as:  COREG        Dose:  3.125 mg   Take 1 tablet (3.125 mg) by mouth 2 times daily (with meals)   Quantity:  180 tablet   Refills:  1       fluticasone 50 MCG/ACT spray   Commonly known as:  FLONASE        Dose:  1 spray   Spray 1 spray into both nostrils 2 times daily   Refills:  0       gabapentin 600 MG tablet   Commonly known as:  NEURONTIN   Used for:  Phantom limb pain (H)        Dose:  600 mg   Take 1 tablet (600 mg) by mouth 3 times daily   Quantity:  270 tablet   Refills:  3       phenazopyridine 100 MG tablet   Commonly known as:  PYRIDIUM   Used for:  Urinary tract infection without hematuria, site unspecified        Dose:  100 mg   Take 1 tablet (100 mg) by mouth 3 times daily as needed for urinary tract discomfort   Quantity:  90 tablet   Refills:  3       torsemide 20 MG tablet   Commonly known as:  DEMADEX        Dose:  20 mg   Take 20 mg by mouth daily   Refills:  0       XARELTO 10 MG Tabs tablet   Generic drug:  rivaroxaban ANTICOAGULANT        Dose:  10 mg   Take 1 tablet (10 mg) by mouth daily (with dinner)   Refills:  0         STOP taking     HYDROcodone-acetaminophen 5-325 MG per tablet   Commonly known as:  NORCO           HYDROMORPHONE HCL PO           oxyCODONE IR 5 MG tablet   Commonly known as:  ROXICODONE                Where to get your medicines      These medications were sent to [facility] [place], [place] - [address]  [address], [place] [address]     Phone:  [phone]     sulfamethoxazole-trimethoprim 800-160 MG per tablet                Protect others around you: Learn how to safely use, store and throw away your medicines at www.disposemymeds.org.        ANTIBIOTIC INSTRUCTION     You've Been Prescribed an Antibiotic - Now What?  Your healthcare team thinks that you or your loved one might have an infection. Some infections can be treated with antibiotics, which are  powerful, life-saving drugs. Like all medications, antibiotics have side effects and should only be used when necessary. There are some important things you should know about your antibiotic treatment.      Your healthcare team may run tests before you start taking an antibiotic.    Your team may take samples (e.g., from your blood, urine or other areas) to run tests to look for bacteria. These test can be important to determine if you need an antibiotic at all and, if you do, which antibiotic will work best.      Within a few days, your healthcare team might change or even stop your antibiotic.    Your team may start you on an antibiotic while they are working to find out what is making you sick.    Your team might change your antibiotic because test results show that a different antibiotic would be better to treat your infection.    In some cases, once your team has more information, they learn that you do not need an antibiotic at all. They may find out that you don't have an infection, or that the antibiotic you're taking won't work against your infection. For example, an infection caused by a virus can't be treated with antibiotics. Staying on an antibiotic when you don't need it is more likely to be harmful than helpful.      You may experience side effects from your antibiotic.    Like all medications, antibiotics have side effects. Some of these can be serious.    Let you healthcare team know if you have any known allergies when you are admitted to the hospital.    One significant side effect of nearly all antibiotics is the risk of severe and sometimes deadly diarrhea caused by Clostridium difficile (C. Difficile). This occurs when a person takes antibiotics because some good germs are destroyed. Antibiotic use allows C. diificile to take over, putting patients at high risk for this serious infection.    As a patient or caregiver, it is important to understand your or your loved one's antibiotic treatment.  It is especially important for caregivers to speak up when patients can't speak for themselves. Here are some important questions to ask your healthcare team.    What infection is this antibiotic treating and how do you know I have that infection?    What side effects might occur from this antibiotic?    How long will I need to take this antibiotic?    Is it safe to take this antibiotic with other medications or supplements (e.g., vitamins) that I am taking?     Are there any special directions I need to know about taking this antibiotic? For example, should I take it with food?    How will I be monitored to know whether my infection is responding to the antibiotic?    What tests may help to make sure the right antibiotic is prescribed for me?      Information provided by:  www.cdc.gov/getsmart  U.S. Department of Health and Human Services  Centers for disease Control and Prevention  National Center for Emerging and Zoonotic Infectious Diseases  Division of Healthcare Quality Promotion             Medication List: This is a list of all your medications and when to take them. Check marks below indicate your daily home schedule. Keep this list as a reference.      Medications           Morning Afternoon Evening Bedtime As Needed    aspirin 81 MG tablet   Take 81 mg by mouth daily   Next Dose Due:  Resume home schedule                                carvedilol 3.125 MG tablet   Commonly known as:  COREG   Take 1 tablet (3.125 mg) by mouth 2 times daily (with meals)   Next Dose Due:  Resume home schedule                                fluticasone 50 MCG/ACT spray   Commonly known as:  FLONASE   Spray 1 spray into both nostrils 2 times daily   Last time this was given:  1 spray on 7/25/2018  8:01 AM            7/26                          gabapentin 600 MG tablet   Commonly known as:  NEURONTIN   Take 1 tablet (600 mg) by mouth 3 times daily   Last time this was given:  600 mg on 7/25/2018  8:01 AM   Next Dose Due:   Take this evening/bedtime 7/25 7/26 7/25 7/25           phenazopyridine 100 MG tablet   Commonly known as:  PYRIDIUM   Take 1 tablet (100 mg) by mouth 3 times daily as needed for urinary tract discomfort                                   sulfamethoxazole-trimethoprim 800-160 MG per tablet   Commonly known as:  BACTRIM DS/SEPTRA DS   Take 1 tablet by mouth 2 times daily for 21 days   Next Dose Due:  Start tomorrow AM 7/26 7/26 7/26               torsemide 20 MG tablet   Commonly known as:  DEMADEX   Take 20 mg by mouth daily   Next Dose Due:  Resume home schedule                                XARELTO 10 MG Tabs tablet   Take 1 tablet (10 mg) by mouth daily (with dinner)   Generic drug:  rivaroxaban ANTICOAGULANT   Next Dose Due:  Take this evening 7/25 7/25                         More Information        Pyelonephritis or Kidney Infection (Adult Male)  An infection of one or both kidneys is called pyelonephritis. It usually happens when bacteria (or rarely, viruses, fungi, or other disease-causing organisms) get into the kidneys. The bacteria (or other disease-causing organisms) can enter the kidneys from the bladder or blood traveling from other parts of the body to cause pyelonephritis. A kidney infection can become serious. It can cause severe illness, scarring of the kidneys, or kidney failure if not treated properly.   Common causes include:    Not keeping the genital area clean and dry, which promotes the growth of bacteria    Wearing tight pants or underwear, which allows moisture to build up in the genital area, helping bacteria grow    Holding urine for long periods of time    Dehydration  Kidney infections can cause symptoms similar to bladder infections. The infection can cause one or more of these symptoms:    Pain or burning when urinating    Having to urinate more often than usual    Blood in urine (pink or red)    Belly pain or  discomfort, usually in the lower abdomen    Pain in the side or back    Pain above the pubic bone    Fever or chills    Vomiting    Loss of appetite  Treatment is oral antibiotics, or in more severe cases, intramuscular or intravenous (IV) antibiotics. These are started right away. Treatment helps prevent a more serious kidney infection.  Medicines  Medicines can help in the treatment of kidney infection:    Take antibiotics until they are used up, even if you feel better. It is important to finish them to make sure the infection is gone.    Unless another medicine was given, you can use over-the-counter medicines for pain, fever, or discomfort. If you have chronic liver or kidney disease, talk with your healthcare provider before taking these medicines. Also tell your provider if you've ever had a stomach ulcer of gastrointestinal (GI) bleeding, or are taking blood thinners.  Home care  The following guidelines can help you care for yourself at home:    Stay home from work or school. Rest in bed until your fever breaks and you are feeling better, or as advised by your healthcare provider.    Drink lots of fluid unless you must restrict fluids for other medical reasons. This will force the medicine into your urinary system and help flush the bacteria out of your body. Ask your healthcare provider how much you should drink.    Avoid sex until you have finished all of your medicine and your symptoms are gone.    Avoid caffeine, alcohol, and spicy foods. These foods may irritate the kidneys and bladder.    Wear loose-fitting clothes and cotton underwear.  Prevention  These self-care steps can help prevent future infections:    Drink plenty of fluids to prevent dehydration and flush out the bladder. Do this unless you must restrict your fluids for other health reasons, or your healthcare provider told you not to.    Proper cleaning after going to the bathroom is important.    Clean your penis regularly. If you aren't  circumcised, pull back the foreskin when cleaning.    Urinate more often. Don't try to hold urine in for a long time.    Avoid tight-fitting pants and underwear.    Improve your diet and prevent constipation. Eat more fresh fruit, vegetables, and fiber. Eat less junk and fatty foods. Constipation can increase your chance of getting a urinary tract infection. Talk with your healthcare provider if you have trouble with bowel movements.    Urinate right after sex to flush out the bladder.  Follow-up care  Follow up with your healthcare provider, or as advised. Additional testing may be needed to make sure the infection is clearing. Close follow-up and further testing is very important to find the cause and to prevent future infections.  If a urine culture was done, you will be contacted if your treatment needs to be changed. If directed, you can call to find out the results.  If you had an X-ray, CT scan, or another diagnostic test, you will be notified of any new findings that may affect your care.  Call 911  Call 911 if any of the following occur:    Trouble breathing    Fainting or loss of consciousness    Rapid or very slow heart rate    Weakness, dizziness, or fainting    Difficulty arousing or confusion  When to seek medical advice  Call your healthcare provider right away if any of the following occur:    Fever of 100.4  F (38  C) or higher, or as directed by your healthcare provider    Not feeling better within 1 to 2 days after starting antibiotics    Any symptom that continues after 3 days of treatment    Increasing pain in the stomach, back, side, or groin area    Repeated vomiting    Not able to take prescribed medicine due to nausea or another reason    Bloody, dark-colored, or foul smelling urine    Trouble urinating or decreased urine output    No urine for 8 hours, no tears when crying, sunken eyes, or dry mouth  Date Last Reviewed: 10/1/2016    0879-4190 [employer]. [address], [place] [address]. All rights reserved. This information is not intended as a substitute for professional medical care. Always follow your healthcare professional's instructions.                Ureteral Stents  A ureteral stent is a soft plastic tube with holes in it. It s temporarily inserted into a ureter to help drain urine into the bladder. One end goes in the kidney. The other end goes in the bladder. A coil on each end holds the stent in place. The stent can t be seen from outside the body. It shouldn t interfere with your normal routine. Your stent will be put in by a doctor trained in treating the urinary tract (a urologist) or another specialist. The procedure is done in a hospital or surgery center. You ll likely go home the same day.  When is a ureteral stent used?  A ureteral stent may be used:    To bypass a blockage in a kidney or ureter.    During kidney stone removal.    To let a ureter heal after surgery.    Before the Procedure  Your healthcare provider will give you instructions to prepare for the procedure. X-rays or other imaging tests of your kidneys and ureters may be done beforehand.  During the procedure    You receive medicine to prevent pain and help you relax or sleep during the procedure. Once this takes effect, the procedure starts.    The doctor inserts a cystoscope (lighted instrument) through the urethra and into the bladder. This shows the opening to the ureter.    A thin wire is carefully threaded through the cystoscope, up the ureter, and into the kidney. The stent is inserted over the wire.    A fluoroscope (special X-ray machine) is used to help position the stent. When the stent is in place, the wire and cystoscope are removed.  While you have a stent    Some discomfort is normal. Certain movements may trigger pain or a feeling that you need to urinate. You may also feel mild soreness or pressure before or during urination. These symptoms will go away a few days after the  stent is removed.    Medicine to control pain or bladder spasms or to prevent infection may be prescribed. Take this as directed.    Drink plenty of fluids to help flush out your urinary tract.    Your urine may be slightly pink or red. This is due to bleeding caused by minor irritation from the stent. This may happen on and off while you have the stent.    As with any synthetic device placed in the body, there is a risk of infection. The stent may have to be removed if this happens.   How long will you need a stent?  The stent is often taken out after the blockage in the ureter is treated or the ureter has healed. This may take 1 week to 2 weeks, or longer. If a stent is needed for a long time, it may need to be changed every few months.  When to call your healthcare provider  Contact your healthcare provider right away if:    Your urine contains blood clots or you see a large amount of blood-tinged urine    You have symptoms similar to those you had before the stent was placed    You constantly leak urine    You have a fever over 100.4 F (38 C), chills, nausea, or vomiting    Your pain is not relieved with medicine    The end of the stent comes out of the urethra   Date Last Reviewed: 1/1/2017 2000-2017 [employer]. [address] [place] [address], [place] [address]. All rights reserved. This information is not intended as a substitute for professional medical care. Always follow your healthcare professional's instructions.

## 2018-07-23 NOTE — ED PROVIDER NOTES
History     Chief Complaint:  Fever    HPI   Antonio Ramirez is a 73 year old male with a complex past medical history who presents to the ED for evaluation of a fever. The patient reports that he developed a fever with chills 2 days ago. His fevers have persisted, reaching 103, and thus he presented to the ED for evaluation. Here in the ED, he adds that he has a productive cough and increased fatigue. His wife notes that he did not have any weakness, which is usually associated with his UTI's, though he had some difficulty urinating. Thus, he saw his urologist this morning, Dr. Howe, and was referred here for further workup. Of note, he had a craniotomy in March for a subdural hematoma, and was taken off of Xarelto. He went back on, but had been planning for a lithotripsy soon, and is not currently anticoagulated. He was last admitted for sepsis secondary to a Klebsiella UTI on 5/21/18.  The patient has a recent history of bilateral ureteral stents, surgical removal of kidney stones on the right, and a history of bilateral percutaneous nephrostomy tubes.  He has a history of pyelonephritis and urinary tract infections.    Allergies:  Ciprofloxacin  Hmg-Coa-R inhibitors     Medications:    Aspirin  Coreg  Flonase  Gabapentin  Pyridium  Xarelto  Demadex    Past Medical History:    Alcoholism  Atrial fibrillation  Subdural hematoma  Left leg amputation  Anemia  Anticardiolipin antibody syndrome  Aortic stenosis  Arthritis  Cardiomyopathy  Coagulation disorder  CAD  CHF  HERD  MI  Hernia  Hyperlipidemia  HTN  Left foot droop  Alcoholic cirrhosis of lovre  Low grade B cell lymphoproliferative disorder  Mitral regurgitation  Monoclonal gammopathy  Neuropathy  Noninfectious ileitis  PE  Prostate cancer  CKD  Thoracic aortic aneurism  Thrombocytopenia  Leg thrombosis  Urethral stricture    Past Surgical History:    Leg amputation  Appendectomy  Wound vac application  Knee  "arthroplasty  CABG  Cholecystectomy  Colonoscopy  Craniotomy  Cystoscopy  Esophagogastroduodenoscopy  Eye surgery  Prostate surgery  Hernia repair  I & D  IVC filter replacement  Irrigation and Debridement  Lithotripsy  Hip surgery  Knee surgery  Elbow surgery    Family History:    Mother: lung cancer  Father: heart failure    Social History:  Marital Status:   [2]  Negative for tobacco use.  Alcohol use: yes     Review of Systems   Constitutional: Positive for chills, fatigue and fever.   Respiratory: Positive for cough.    Genitourinary: Positive for difficulty urinating.   Neurological: Negative for weakness.   All other systems reviewed and are negative.    Physical Exam     Patient Vitals for the past 24 hrs:   BP Temp Temp src Heart Rate Resp SpO2 Height Weight   07/23/18 1630 103/48 - - - - - - -   07/23/18 1600 116/63 - - - - - - -   07/23/18 1530 128/71 - - - - - - -   07/23/18 1514 - 100.5  F (38.1  C) Oral - - - - -   07/23/18 1500 143/69 - - - - 96 % - -   07/23/18 1445 - - - - - 95 % - -   07/23/18 1430 105/86 - - - - 98 % - -   07/23/18 1415 117/57 - - - - 94 % - -   07/23/18 1330 111/65 - - - - 98 % - -   07/23/18 1234 107/55 99.3  F (37.4  C) Oral 70 16 97 % 1.88 m (6' 2\") 94.8 kg (209 lb)     Physical Exam  General: Resting comfortably on the gurney, appears pale and mildly uncomfortable.  Head:  The scalp, face, and head appear normal, except prior right craniotomy scar.  Eyes:  The pupils are equal, round, and reactive to light    There is no nystagmus    Extraocular muscles are intact    Conjunctivae and sclerae are normal  ENT:    The nose is normal    Pinnae are normal    The oropharynx is normal    Uvula is in the midline  Neck:  Normal range of motion    There is no rigidity noted    There is no midline cervical spine pain/tenderness    Trachea is in the midline    No mass is detected  CV:  Regular rate and underlying rhythm     Normal S1/S2, no S3/S4    No pathological murmur " detected  Resp:  Lungs are clear    There is no tachypnea    Non-labored    No rales    No wheezing   GI:  Abdomen is soft, there is no rigidity    Appendectomy scar    No distension    No tympani    No rebound tenderness     Non-surgical without peritoneal features  :  Normal penis. Normal scrotum. Incontinence garment  MS:  Left below the knee amputation with prosthesis.     No major joint effusions    No asymmetric leg swelling, no calf tenderness  Skin:  Skin tear to the extensor surface of right forearm, 6 cm by 2 cm, left forearm 4   cm by 2 cm, both non suturable. Right knee superficial abrasion 3 cm by 3 cm.  Neuro: Speech is normal and fluent  Psych:  Awake. Alert.      Normal affect.  Appropriate interactions.  Lymph: No anterior cervical lymphadenopathy noted    Emergency Department Course   Imaging:  Radiographic findings were communicated with the patient who voiced understanding of the findings.  XR Chest 2 views:   Postoperative change. Shallow inspiration with some mild atelectasis in the lung bases. Chest otherwise negative, as per radiology.     Laboratory:  CBC: WBC: 7.1, HGB: 10.9 (L), PLT: 82 (L)  BMP: Glucose 104 (H), BUN 31 (H), Creatinine 1.91 (H), GFR 34 (L), Calcium 8.1 (L), o/w WNL  Blood cultures (X2): pending    1249 Lactic acid: 2.9 (H)  1447 Lactic acid: 1.8    UA with Microscopic: blood moderate (A), protein albumin 100 (A), leukocyte esterase large (A), WBC >182 (H), RBC >182 (H), WBC clumps present (A), bacteria many (A), SE 2 (H), mucous present (A), o/w WNL  Urine culture: pending    Interventions:  1329 NS 1L IV  1412 NS 1L IV  1413 Zosyn 3.375 g IV  1448 NS 0.9% 125 mL/hr IV  1510 Rocephin 1 g IV  1526 Tylenol 1000 mg PO  Please see MAR for full list of medications administered in the ED.    Emergency Department Course:  Nursing notes and vitals reviewed. (5939) I performed an exam of the patient as documented above.     IV inserted. Medicine administered as documented above.  Blood drawn. This was sent to the lab for further testing, results above.    The patient provided a urine sample here in the emergency department. This was sent for laboratory testing, findings above.     The patient was sent for a Chest XR while in the emergency department, findings above.     (1551) I rechecked the patient and discussed the results of his workup thus far.     (1610)  I consulted with Dr. Pat of the hospitalist services. They are in agreement to accept the patient for admission.    (1615) I consulted with Dr. Howe, Urology, regarding the patient's history and presentation here in the emergency department. He notes that the patient has a history of percutaneous nephrostomy tubes internalized to bilateral ureteral stents.     (1625) I reevaluated the patient and provided an update in regards to his ED course.  The patient notes that he does not self cath.     Findings and plan explained to the Patient who consents to admission. Discussed the patient with Dr. Pat, who will admit the patient to a medical bed for further monitoring, evaluation, and treatment.    Impression & Plan    CMS Diagnoses:   The patient has signs of Severe Sepsis as evidenced by:    1. 2 SIRS criteria, AND  2. Suspected infection, AND   3. Organ dysfunction: Lactic Acid > 1.9    Time severe sepsis diagnosis confirmed = 1344 as this was the time when Lactate resulted, and the level was > 1.9    3 Hour Severe Sepsis Bundle Completion:  1. Initial Lactic Acid Result:   Recent Labs   Lab Test  07/23/18   1447  07/23/18   1249  05/21/18   1903   LACT  1.8  2.9*  1.0     2. Blood Cultures before Antibiotics: Yes  3. Broad Spectrum Antibiotics Administered: Yes     Anti-infectives     Zosyn & Ceftriaxone        4. 2400 ml of IV fluids. Not including antibiotics and ongoing infusion.  Ideal body weight: 82.2 kg (181 lb 3.5 oz)  Adjusted ideal body weight: 87.2 kg (192 lb 5.3 oz)    Severe Sepsis reassessment:  1.  Repeat Lactic Acid Level: 1.8  2. MAP>65 after initial IVF bolus, will continue to monitor fluid status and vital signs (there was never hypotension, even before IV fluid)  I attest to having performed a repeat sepsis exam and assessment of perfusion at 1600 and the results demonstrate improved perfusion.    Medical Decision Making:  Antonio Ramirez is a 73 year old male presents with fevers chills, rigors, a history of pyelonephritis and history of renal pelvis stones on the left that are in need of operative intervention, urinary retention history, urethral stricture history, recent Klebsiella sepsis presents with a recurrent septic appearance.    The patient was supposed to have surgical intervention on the left intrarenal stones today.  Surgery was canceled given his septic presentation.  The patient still has persistent by lateral ureteral stents and has a history of percutaneous nephrostomies.  His last urine culture was positive for Klebsiella.    The patient presents with fever chills sweats and Rigors.  There had been some recent cough.  Concern was for pneumonia or urinary tract infection.  The patient was noted to have purulent urine.  This may be secondary to urinary tract infection, infected ureteral stents, infected left intrarenal stones, and/or pyelonephritis.  Perinephric abscess is in the differential diagnosis.  We discussed the entire case with Dr. Howe, the patient's primary urologist.  At this juncture he would like IV hydration, intravenous antibiotics, and the patient will be made n.p.o. for probable ureteral stent change out tomorrow.  In terms of imaging, we will hold off at this juncture given the patient's elevated creatinine.  His renal function will be rechecked tomorrow.  Dr. Howe would prefer that the patient have additional resuscitative measures and stabilization prior to surgical intervention.  He notes that the patient may require outpatient antibiotics after the sepsis clears  and then the stone procedure can be done in a delayed fashion.    Diagnosis:    ICD-10-CM    1. Acute pyelonephritis N10    2. Infection associated with indwelling ureteral stent, initial encounter (H) T83.592A    3. Severe sepsis (H) A41.9     R65.20      Disposition:  Admitted to Dr. Pat, Dr. Howe Consulting.    Scribe Disclosure:  I, Mary Pruitt, am serving as a scribe on 7/23/2018 at 12:42 PM to personally document services performed by Dony Chowdary MD based on my observations and the provider's statements to me.     Mary Pruitt  7/23/2018    EMERGENCY DEPARTMENT       Dony Chowdary MD  07/23/18 8561

## 2018-07-23 NOTE — H&P
[facility]    History and Physical  Hospitalist       Date of Admission:  7/23/2018    Assessment & Plan   Antonio Ramirez is a 73 year old male who presents with fever, chills    Severe sepsis  Lactic acidosis  History of bilateral nephrostomy tubes on 5/22, bilateral stent placement on 5/24  Fever, chills, and lactic elevation  Source presumed to be urinary given his abnormal UA and known stents.  Discussed at length with Dr. Chowdary and Dr. Howe, who specifically advised against any need for imaging currently, he has known bilateral ureteral stent in places and will need exchange  Admission for sepsis treatment and npo at midnight was advised  Lactate normalized after IVF infusion  Plan  - admit for IV antibiotics of zosyn  - continue fluids at 75 ml/hr  - blood and urine cultures sent  - consult urology, planning on stent exchange in the AM    Acute on chronic kidney disease  Likely secondary to prerenal or sepsis  S/p 2 liters of fluid in the ED  - continue gentle IVF overnight, care taken given underlying congestive heart failure  - hold torsemide and antihypertensives    History of DVT PE s/p IVC filter and Anticardiolipin Antibody  On xarelto normally, holding for 1 week prior to urological instrumentation  - continue holding the xarelto, presumed restart after urological procedure    Alcoholic cirrhosis  Thrombocytopenia and anemia  He reports 2 drinks a day of whiskey, but has not withdrawn in the past.  Per the chart review  - noted    CAD status post CABG ×3 in 2007  Atrial fibrillation  Severe pulmonary HTN  Chronic compensated systolic congestive heart failure  - stable currently  - will hold his blood pressure agents because of the sepsis  - low threshold to restart home torsemide    Multiple wounds abrasion on the right knee  He declined removal of his bandages  - wound nurse consultation    H/o recent traumatic subdural hematoma  Was restarted on his xarelto, but holding because of  the planned urology instrumentation      DVT Prophylaxis: Pneumatic Compression Devices  Code Status: Full Code    Disposition: Expected discharge in 3-5 days once sepsis and source control obtained.    Sandoval Pat DO    Primary Care Physician   [facility]    Chief Complaint   Fever and chills    History is obtained from the patient and wife    History of Present Illness   Antonio Ramirez is a 73 year old male who presents with fever and chills that started 2 days ago, reaching 103 degrees. He has a history of klebsiela bacteremia presumed from urinary source, followed closely with Dr. Howe at [facility], and has 2 stents in place. Wife notes he started feeling ill after a party. No abdominal pain, or really pain everywhere. No numbness or tingling. Has been out of the hospital since last stay per their reportin 5/2018 and his wife reports nothing is new from the medical standpoint. He presented to his urologists office today, who sent him to the emergency room because of the fever. ROS negative for abdominal pain, but positive for change in his urine, mild bleeding from some skin tears. He is getting iron infusions as well. No other signs of bleeding noted.      In the ED he was found to have a fever of 101.1 and a lactic acid of 2.9 meeting the criteria for severe sepsis. That improved to 1.9 after administration of 2 liters of NS. He was given ceftriaxone and zoysn.    Past Medical History    I have reviewed this patient's medical history and updated it with pertinent information if needed.   Past Medical History:   Diagnosis Date     Alcoholism (H)      Amputated left leg (H)      Anemia      Anticardiolipin antibody syndrome (H)      Antiplatelet or antithrombotic long-term use      Aortic stenosis      Arthritis      Balance problem      Cardiomyopathy (H)      Coagulation disorder (H)     cardiolipinantibody syndrome     Coronary artery disease      Gastro-oesophageal reflux disease       Heart attack 2007    apparently arrested, cardiac X5     Hiatal hernia      Hypercholesterolemia      Hypertension      Left foot drop      Liver disease     alcoholic liver disease, alcoholic cirrohsosis, portal hypertension     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)     ?When diagnosed, patient not aware of this     Moderate mitral regurgitation      Monoclonal gammopathy      Neuropathy      Noninfectious ileitis     diverticulitis of colon     PE (pulmonary embolism) 2006     Prostate cancer (H) 2000    Treated with radiation (seed implants in 2000) -- no problems since     Renal disease     kidney stones     Syncope      Thoracic aortic aneurysm, without rupture      Thrombocytopenia (H)      Thrombosis of leg      Urethral stricture        Past Surgical History   I have reviewed this patient's surgical history and updated it with pertinent information if needed.  Past Surgical History:   Procedure Laterality Date     AMPUTATE LEG BELOW KNEE Left 04/2014    related to gangrene, started as foot ulcer related to neuropathy     AMPUTATE LEG BELOW KNEE Left 12/4/2017    Procedure: AMPUTATE LEG BELOW KNEE;  Exploration of Left Leg Below Knee Amputation Stump with Ligation of Bleeders.;  Surgeon: Leandro Arreola MD;  Location:  OR     APPENDECTOMY       APPLY WOUND VAC Left 12/6/2017    Procedure: APPLY WOUND VAC;;  Surgeon: Saima Howard MD;  Location:  [facility]     ARTHROPLASTY KNEE Right 9/19/2014    Procedure: ARTHROPLASTY KNEE;  Surgeon: Saima Howard MD;  Location:  OR     CARDIAC SURGERY      CABG     CHOLECYSTECTOMY       COLONOSCOPY       CRANIOTOMY Right 4/7/2018    Procedure: CRANIOTOMY;  Right Craniotomy For Subdural Hematoma;  Surgeon: Jono Issa MD;  Location:  OR     CYSTOSCOPY, DILATE URETHRA, COMBINED N/A 10/12/2016    Procedure: COMBINED CYSTOSCOPY, DILATE URETHRA;  Surgeon: Dimitry Bello MD;  Location:  OR     ENDOSCOPIC STRIPPING VEIN(S)        esophagogastroduodenoscopy       EYE SURGERY      cataracts     GENITOURINARY SURGERY      Prostate Seen Implants     HERNIA REPAIR      Umbilical     INCISION AND DRAINAGE LOWER EXTREMITY, COMBINED Left 12/1/2017    Procedure: COMBINED INCISION AND DRAINAGE LOWER EXTREMITY;  INCISION AND DRAINAGE OF LEFT BELOW KNEE AMPUTATION ABSCESS, WOUND VAC PLACEMENT;  Surgeon: Saima Howard MD;  Location:  OR     IR IVC FILTER PLACEMENT       IRRIGATION AND DEBRIDEMENT LOWER EXTREMITY, COMBINED Left 12/6/2017    Procedure: COMBINED IRRIGATION AND DEBRIDEMENT LOWER EXTREMITY;  IRRIGATION AND DEBRIDEMENT OF LEFT BELOW KNEE AMPUTATION SITE WITH WOUND VAC REPLACEMENT;  Surgeon: Saima Howard MD;  Location:  [facility]     IRRIGATION AND DEBRIDEMENT LOWER EXTREMITY, COMBINED Left 12/8/2017    Procedure: COMBINED IRRIGATION AND DEBRIDEMENT LOWER EXTREMITY;  IRRIGATION AND DEBRIDEMENT OF LEFT BELOW THE KNEE AMPUTATION AND SHORTENING OF THE TIBIA WITH WOUND CLOSURE;  Surgeon: Saima Howard MD;  Location:  OR     LASER HOLMIUM LITHOTRIPSY URETER(S), INSERT STENT, COMBINED Bilateral 6/28/2018    Procedure: COMBINED CYSTOSCOPY, URETEROSCOPY, LASER HOLMIUM LITHOTRIPSY URETER(S), INSERT STENT;  CYSTOSCOPY, BILATERAL URETEROSCOPY,  HOLMIUM LASER LITHOTRIPSY, BILATERAL STENT PLACEMENT, STONE BASKETING;  Surgeon: Jose Hunter MD;  Location:  OR     ORTHOPEDIC SURGERY      (R) knee scope     ORTHOPEDIC SURGERY      total hip      ORTHOPEDIC SURGERY      elbow surgery       Prior to Admission Medications   Prior to Admission Medications   Prescriptions Last Dose Informant Patient Reported? Taking?   HYDROMORPHONE HCL PO PRN  Yes Yes   Sig: Take 2 mg by mouth every 6 hours as needed for moderate to severe pain   HYDROcodone-acetaminophen (NORCO) 5-325 MG per tablet PRN  No Yes   Sig: Take 1-2 tablets by mouth every 4 hours as needed for severe pain (Moderate to Severe Pain)   aspirin 81 MG tablet 7/22/2018 at Unknown time   "Yes Yes   Sig: Take 81 mg by mouth daily   carvedilol (COREG) 3.125 MG tablet 2018 at Unknown time Self Yes Yes   Sig: Take 1 tablet (3.125 mg) by mouth 2 times daily (with meals)   fluticasone (FLONASE) 50 MCG/ACT spray 2018 at Unknown time Self Yes Yes   Sig: Spray 1 spray into both nostrils 2 times daily    gabapentin (NEURONTIN) 600 MG tablet 2018 at Unknown time  No Yes   Sig: Take 1 tablet (600 mg) by mouth 3 times daily   oxyCODONE IR (ROXICODONE) 5 MG tablet PRN  No Yes   Sig: Take 1 tablet (5 mg) by mouth every 6 hours as needed for other (pain control or improvement in physical function. Hold dose for analgesic side effects.)   phenazopyridine (PYRIDIUM) 100 MG tablet PRN  No Yes   Sig: Take 1 tablet (100 mg) by mouth 3 times daily as needed for urinary tract discomfort   rivaroxaban ANTICOAGULANT (XARELTO) 10 MG TABS tablet 2018 at Unknown time  Yes Yes   Sig: Take 1 tablet (10 mg) by mouth daily (with dinner)   torsemide (DEMADEX) 20 MG tablet past month Self Yes No   Sig: Take 20 mg by mouth daily      Facility-Administered Medications: None     Allergies   Allergies   Allergen Reactions     Ciprofloxacin Itching     Severe itching \"like ants were crawling\"     Hmg-Coa-R Inhibitors Other (See Comments)     Rhabdomyolysis occurred within a couple days       Social History   I have reviewed this patient's social history and updated it with pertinent information if needed. Antonio Ramirez  reports that he has never smoked. He has never used smokeless tobacco. He reports that he drinks alcohol. He reports that he does not use illicit drugs.    Family History   I have reviewed this patient's family history and updated it with pertinent information if needed.   Family History   Problem Relation Age of Onset     Lung Cancer Mother      Heart Failure Father       age 87       Review of Systems   The 10 point Review of Systems is negative other than noted in the HPI or here.     Physical " Exam   Temp: 100.5  F (38.1  C) Temp src: Oral BP: 91/46   Heart Rate: 70 Resp: 16 SpO2: 96 %      Vital Signs with Ranges  Temp:  [99.3  F (37.4  C)-101.1  F (38.4  C)] 100.5  F (38.1  C)  Heart Rate:  [70] 70  Resp:  [16] 16  BP: ()/(46-86) 91/46  SpO2:  [94 %-98 %] 96 %  209 lbs 0 oz    Constitutional: Awake, alert, cooperative, no apparent distress. Unhappy about being in the hospital  Eyes: Conjunctiva and pupils examined and normal.  HEENT: Moist mucous membranes, normal dentition.  Respiratory: Clear to auscultation bilaterally, no crackles or wheezing.  Cardiovascular: Regular rate and rhythm, normal S1 and S2, and no murmur noted.  GI: Soft, non-distended, non-tender, normal bowel sounds.  Lymph/Hematologic: No anterior cervical or supraclavicular adenopathy.  Skin: arms are wrapped and he declines removal , abrasion of the right knee  Musculoskeletal: No joint swelling, left BKA  Psychiatric: Alert, oriented to person, place and time, no obvious anxiety or depression.    Data   Data reviewed today:  I personally reviewed cxr with mild atelectasis and shallow inspiration    Recent Labs  Lab 07/23/18  1249   WBC 7.1   HGB 10.9*   MCV 90   PLT 82*      POTASSIUM 4.3   CHLORIDE 103   CO2 24   BUN 31*   CR 1.91*   ANIONGAP 8   BENNIE 8.1*   *       Imaging:  Recent Results (from the past 24 hour(s))   XR Chest 2 Views    Narrative    XR CHEST 2 VW 7/23/2018 2:14 PM    HISTORY: fever, cough, pneumonia;       Impression    IMPRESSION: Postoperative change. Shallow inspiration with some mild  atelectasis in the lung bases. Chest otherwise negative.    MIKO ALSTON MD

## 2018-07-23 NOTE — LETTER
Transition Communication Hand-off for Care Transitions to Next Level of Care Provider    Name: Antonio Ramirez  : 1945  MRN #: [id number]  Primary Care Provider: Main Hardy     Primary Clinic: [address] [facility] [address]  [facility] [address]     Reason for Hospitalization:  Acute pyelonephritis [N10]  Severe sepsis (H) [A41.9, R65.20]  Infection associated with indwelling ureteral stent, initial encounter (H) [T83.592A]  Admit Date/Time: 2018 12:37 PM  Discharge Date: 18  Payor Source: Payor: MEDICARE / Plan: MEDICARE / Product Type: Medicare /     Readmission Assessment Measure (MANSOOR) Risk Score/category: Elevated           Reason for Communication Hand-off Referral: Other Elevated MANSOOR    Discharge Plan:Home    Concern for non-adherence with plan of care: No     Discharge Needs Assessment:  Needs       Most Recent Value    Equipment Currently Used at Home walker, rolling [FWW only when sepsis first started. Now improving, no AD ]          Already enrolled in Tele-monitoring program and name of program:  NA  Follow-up specialty is recommended: Yes, Urology    Follow-up plan:  Future Appointments  Date Time Provider Department Center   2018 2:30 PM Main Hardy MD [facility]    10/2/2018 11:15 AM Elena Downs MD [facility]       Any outstanding tests or procedures:        Referrals     Future Labs/Procedures    MED THERAPY MANAGE REFERRAL     Comments:    Patient has diagnosis of Diabetes, Heart Failure, COPD, or AMI and is on more than 5 medications    Physical Therapy Referral     Comments:    *This therapy referral will be filtered to a centralized scheduling office at [facility] and the patient will receive a call to schedule an appointment at a [place] location most convenient for them. *     [facility] provides Physical Therapy evaluation and treatment and many specialty services across the [place] system.  If requesting a  "specialty program, please choose from the list below.    If you have not heard from the scheduling office within 2 business days, please call [phone] for all locations, with the exception of [place], please call [phone] and [facility] [place], please call [phone]  Treatment: Evaluation & Treatment  Special Instructions/Modalities: None  Special Programs: None    Please be aware that coverage of these services is subject to the terms and limitations of your health insurance plan.  Call member services at your health plan with any benefit or coverage questions.      **Note to Provider:  If you are referring outside of [place] for the therapy appointment, please list the name of the location in the \"special instructions\" above, print the referral and give to the patient to schedule the appointment.              Severe sepsis (bacteremia, Klebsiella pneumoniae), UTI. He had bilateral nephrostomy tubes on 5/22 and bilateral ureteral stents placed on 5/24.  Pt had repeat cystoscopy on 7/24/18 with bilateral stents replaced. Infectious Disease followed pt while here.    Pt had some delirium issues early 7/25, that has resolved. He does drink to alcoholic drinks per day.  Pt is a retired  and continues to be busy.  PT recommended Outpatient PT.  Key Recommendations:   PCP follow-up next Monday to discuss hospitalization and recheck a creatinine while on antibiotics   Outpt PT  Discharged with Bactrim DS to take twice daily for 3 weeks   Urology intervention scheduled for August 14th at [facility]  Pt is to hold aspirin and Xarelto for 7 days prior to procedure    Thank-you,    Atiya Aleman  RN Care Coordinator  [facility]    AVS/Discharge Summary is the source of truth; this is a helpful guide for improved communication of patient story          "

## 2018-07-23 NOTE — ED NOTES
"[facility]  ED Nurse Handoff Report    ED Chief complaint: Fever (Fever/chills at home since last Sat. High of 103F. +productive cough. )      ED Diagnosis:   Final diagnoses:   None       Code Status: Full Code    Allergies:   Allergies   Allergen Reactions     Ciprofloxacin Itching     Severe itching \"like ants were crawling\"     Hmg-Coa-R Inhibitors Other (See Comments)     Rhabdomyolysis occurred within a couple days       Activity level - Baseline/Home:  Independent    Activity Level - Current:   Independent     Needed?: No    Isolation: No  Infection: Not Applicable  Bariatric?: No    Vital Signs:   Vitals:    07/23/18 1234 07/23/18 1330   BP: 107/55 111/65   Resp: 16    Temp: 99.3  F (37.4  C)    TempSrc: Oral    SpO2: 97% 98%   Weight: 94.8 kg (209 lb)    Height: 1.88 m (6' 2\")        Cardiac Rhythm: ,        Pain level: 0-10 Pain Scale: 0    Is this patient confused?: No   [place]   Suicide Severity Rating Scale Completed?  Yes  If yes, what color did the patient score?  White    Patient Report: Initial Complaint: Fever  Focused Assessment: States he has had the chills over the past few days along with a fever at home up to 103F. He has a productive cough but not visualized the phlegm. He also states he feels the urge to urinate but does not void a large amount. A bladder scan done in the ED showed 100cc in the bladder. Pt has been unable to provide a urine sample up until this time.  Tests Performed: Lactic acid   Abnormal Results: 2.9  Treatments provided: Zosyn and 2 liters NS given.     Family Comments: Wife was here. Will be returning.    OBS brochure/video discussed/provided to patient: N/A    ED Medications:   Medications   piperacillin-tazobactam (ZOSYN) 3.375 g vial to attach to  mL bag (3.375 g Intravenous New Bag 7/23/18 1413)   0.9% sodium chloride BOLUS (1,000 mLs Intravenous New Bag 7/23/18 1412)   0.9% sodium chloride BOLUS (0 mLs Intravenous Stopped 7/23/18 " 7093)       Drips infusing?:  Yes    For the majority of the shift this patient was Green.   Interventions performed were NA.    Severe Sepsis OR Septic Shock Diagnosis Present:   Yes    Per the ED Provider, Time Zero for severe sepsis or septic shock is:  1344    3 Hour Severe Sepsis Bundle Completion:  1. Initial Lactic Acid Result:   Recent Labs   Lab Test  07/23/18   1249  05/21/18   1903  05/21/18   1719   LACT  2.9*  1.0  2.2*     2. Blood Cultures before Antibiotics: Yes  3. Broad Spectrum Antibiotics Administered:Yes     Anti-infectives (Future)    Start     Dose/Rate Route Frequency Ordered Stop    07/23/18 1352  piperacillin-tazobactam (ZOSYN) 3.375 g vial to attach to  mL bag      3.375 g  over 30 Minutes Intravenous ONCE 07/23/18 1351          4. 2000 ml of IV fluids have been given so far      6 Hour Severe Sepsis Bundle Completion:    1. Repeat Lactic Acid Level: Will draw before sending pt to the floor  2. Patient currently on Vasopressors =  No      ED NURSE PHONE NUMBER: [phone]

## 2018-07-23 NOTE — IP AVS SNAPSHOT
[place] [address] [facility]    [address] [place] [address]    Phone:  [phone]                                       After Visit Summary   7/23/2018    Antonio Ramirez    MRN: [id number]           After Visit Summary Signature Page     I have received my discharge instructions, and my questions have been answered. I have discussed any challenges I see with this plan with the nurse or doctor.    ..........................................................................................................................................  Patient/Patient Representative Signature      ..........................................................................................................................................  Patient Representative Print Name and Relationship to Patient    ..................................................               ................................................  Date                                            Time    ..........................................................................................................................................  Reviewed by Signature/Title    ...................................................              ..............................................  Date                                                            Time

## 2018-07-23 NOTE — PROGRESS NOTES
RECEIVING UNIT ED HANDOFF REVIEW    ED Nurse Handoff Report was reviewed by: Chinyere Cespedes on July 23, 2018 at 5:08 PM

## 2018-07-23 NOTE — CONSULTS
[facility]    Urology Consultation     Date of Admission:  7/23/2018    Assessment & Plan   Antonio Ramirez is a 73 year old male who was admitted on 7/23/2018. I was asked to see the patient for urosepsis with bilateral ureteral stents.    Vitals wnl    Cr slightly elevated at 1.9 when previously was 1.2    Urine positive and with stents in place must assume infection has spread to kidneys    Responded well to fluid resuscitation in ER with lactate dropping from 2.9 to 1.8.     Patient is septic, but not in shock    Patient received pip-tazo and ceftriaxone in ED    Plan:     Admit to medicine    Cont Abx    Plan on cystoscopy, right retrograde, stent removal, and left stent exchange tomorrow in OR    NPO after midnight    Will postpone left stone removal until infection has resolved    *Discussed this case in detail with Dr. Hunter who approved of the plan.      Lai Donis PA-C 7/23/2018 4:36 PM  [facility]  Mon-Fri, 7am - 5pm  Text page [phone]  Office: [phone]      Code Status    Prior    Reason for Consult   Reason for consult: I was asked by Dr. Chowdary to evaluate this patient for urosepsis.    Primary Care Physician   Main Hardy    Chief Complaint   Fever, chills    History is obtained from the patient    History of Present Illness   Antonio Ramirez is a 73 year old male who presents with fever and chills for the last week. He was scheduled for left ureteroscopy and stone removal but had to cancel his operation because he had been feeling feverish and chills over the weekend. Had fever and chills In May he had bilateral stones and was septic with bilateral perc neph tubes placed. Both were internalized to stents, and Dr. Hunter performed ureteroscopy to remove the stones on the right side. The patient currently has stones on left side and stents bilaterally. Has not had anything to eat since last night.    Past Medical History   I have reviewed this patient's  medical history and updated it with pertinent information if needed.   Past Medical History:   Diagnosis Date     Alcoholism (H)      Amputated left leg (H)      Anemia      Anticardiolipin antibody syndrome (H)      Antiplatelet or antithrombotic long-term use      Aortic stenosis      Arthritis      Balance problem      Cardiomyopathy (H)      Coagulation disorder (H)     cardiolipinantibody syndrome     Coronary artery disease      Gastro-oesophageal reflux disease      Heart attack 2007    apparently arrested, cardiac X5     Hiatal hernia      Hypercholesterolemia      Hypertension      Left foot drop      Liver disease     alcoholic liver disease, alcoholic cirrohsosis, portal hypertension     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)     ?When diagnosed, patient not aware of this     Moderate mitral regurgitation      Monoclonal gammopathy      Neuropathy      Noninfectious ileitis     diverticulitis of colon     PE (pulmonary embolism) 2006     Prostate cancer (H) 2000    Treated with radiation (seed implants in 2000) -- no problems since     Renal disease     kidney stones     Syncope      Thoracic aortic aneurysm, without rupture      Thrombocytopenia (H)      Thrombosis of leg      Urethral stricture        Past Surgical History   I have reviewed this patient's surgical history and updated it with pertinent information if needed.  Past Surgical History:   Procedure Laterality Date     AMPUTATE LEG BELOW KNEE Left 04/2014    related to gangrene, started as foot ulcer related to neuropathy     AMPUTATE LEG BELOW KNEE Left 12/4/2017    Procedure: AMPUTATE LEG BELOW KNEE;  Exploration of Left Leg Below Knee Amputation Stump with Ligation of Bleeders.;  Surgeon: Leandro Arreola MD;  Location:  OR     APPENDECTOMY       APPLY WOUND VAC Left 12/6/2017    Procedure: APPLY WOUND VAC;;  Surgeon: Saima Howard MD;  Location:  SD     ARTHROPLASTY KNEE Right 9/19/2014    Procedure: ARTHROPLASTY  KNEE;  Surgeon: Saima Howard MD;  Location:  OR     CARDIAC SURGERY      CABG     CHOLECYSTECTOMY       COLONOSCOPY       CRANIOTOMY Right 4/7/2018    Procedure: CRANIOTOMY;  Right Craniotomy For Subdural Hematoma;  Surgeon: Jono Issa MD;  Location:  OR     CYSTOSCOPY, DILATE URETHRA, COMBINED N/A 10/12/2016    Procedure: COMBINED CYSTOSCOPY, DILATE URETHRA;  Surgeon: Dimitry Bello MD;  Location:  OR     ENDOSCOPIC STRIPPING VEIN(S)       esophagogastroduodenoscopy       EYE SURGERY      cataracts     GENITOURINARY SURGERY      Prostate Seen Implants     HERNIA REPAIR      Umbilical     INCISION AND DRAINAGE LOWER EXTREMITY, COMBINED Left 12/1/2017    Procedure: COMBINED INCISION AND DRAINAGE LOWER EXTREMITY;  INCISION AND DRAINAGE OF LEFT BELOW KNEE AMPUTATION ABSCESS, WOUND VAC PLACEMENT;  Surgeon: Saima Howard MD;  Location:  OR     IR IVC FILTER PLACEMENT       IRRIGATION AND DEBRIDEMENT LOWER EXTREMITY, COMBINED Left 12/6/2017    Procedure: COMBINED IRRIGATION AND DEBRIDEMENT LOWER EXTREMITY;  IRRIGATION AND DEBRIDEMENT OF LEFT BELOW KNEE AMPUTATION SITE WITH WOUND VAC REPLACEMENT;  Surgeon: Saima Howard MD;  Location: [facility]     IRRIGATION AND DEBRIDEMENT LOWER EXTREMITY, COMBINED Left 12/8/2017    Procedure: COMBINED IRRIGATION AND DEBRIDEMENT LOWER EXTREMITY;  IRRIGATION AND DEBRIDEMENT OF LEFT BELOW THE KNEE AMPUTATION AND SHORTENING OF THE TIBIA WITH WOUND CLOSURE;  Surgeon: Saima Howard MD;  Location:  OR     LASER HOLMIUM LITHOTRIPSY URETER(S), INSERT STENT, COMBINED Bilateral 6/28/2018    Procedure: COMBINED CYSTOSCOPY, URETEROSCOPY, LASER HOLMIUM LITHOTRIPSY URETER(S), INSERT STENT;  CYSTOSCOPY, BILATERAL URETEROSCOPY,  HOLMIUM LASER LITHOTRIPSY, BILATERAL STENT PLACEMENT, STONE BASKETING;  Surgeon: Jose Hunter MD;  Location:  OR     ORTHOPEDIC SURGERY      (R) knee scope     ORTHOPEDIC SURGERY      total hip      ORTHOPEDIC SURGERY       "elbow surgery       Prior to Admission Medications   Prior to Admission Medications   Prescriptions Last Dose Informant Patient Reported? Taking?   HYDROcodone-acetaminophen (NORCO) 5-325 MG per tablet   No No   Sig: Take 1-2 tablets by mouth every 4 hours as needed for severe pain (Moderate to Severe Pain)   aspirin 81 MG tablet   Yes No   Sig: Take 81 mg by mouth daily   carvedilol (COREG) 3.125 MG tablet  Self Yes No   Sig: Take 1 tablet (3.125 mg) by mouth 2 times daily (with meals)   fluticasone (FLONASE) 50 MCG/ACT spray  Self Yes No   Sig: Spray 2 sprays into both nostrils daily   gabapentin (NEURONTIN) 600 MG tablet   No No   Sig: Take 1 tablet (600 mg) by mouth 3 times daily   oxyCODONE IR (ROXICODONE) 5 MG tablet   No No   Sig: Take 1 tablet (5 mg) by mouth every 6 hours as needed for other (pain control or improvement in physical function. Hold dose for analgesic side effects.)   phenazopyridine (PYRIDIUM) 100 MG tablet   No No   Sig: Take 1 tablet (100 mg) by mouth 3 times daily as needed for urinary tract discomfort   rivaroxaban ANTICOAGULANT (XARELTO) 10 MG TABS tablet   Yes No   Sig: Take 1 tablet (10 mg) by mouth daily (with dinner)   torsemide (DEMADEX) 20 MG tablet  Self Yes No   Sig: Take 20 mg by mouth daily      Facility-Administered Medications: None     Allergies   Allergies   Allergen Reactions     Ciprofloxacin Itching     Severe itching \"like ants were crawling\"     Hmg-Coa-R Inhibitors Other (See Comments)     Rhabdomyolysis occurred within a couple days       Social History   I have reviewed this patient's social history and updated it with pertinent information if needed. Antonio Ramirez  reports that he has never smoked. He has never used smokeless tobacco. He reports that he drinks alcohol. He reports that he does not use illicit drugs.    Family History   I have reviewed this patient's family history and updated it with pertinent information if needed.   Family History   Problem " Relation Age of Onset     Lung Cancer Mother      Heart Failure Father       age 87       Review of Systems   The 10 point Review of Systems is negative other than noted in the HPI or here.    Physical Exam   Temp: 100.5  F (38.1  C) Temp src: Oral BP: 103/48   Heart Rate: 70 Resp: 16 SpO2: 96 %      Vital Signs with Ranges  Temp:  [99.3  F (37.4  C)-101.1  F (38.4  C)] 100.5  F (38.1  C)  Heart Rate:  [70] 70  Resp:  [16] 16  BP: (103-143)/(48-86) 103/48  SpO2:  [94 %-98 %] 96 %  209 lbs 0 oz    General: Patient reclined in bed resting comfortably  Head: Normocephalic, atraumatic  Eyes: No icterus  Neck: Symmetric  Respiratory: Breathing unlabored  Cardiac: Skin well-perfused  Skin: no visible rashes   Extremities: No LE edema, no calf pain  Neurologic: No focal deficits  Neuropsychiatric: A&O x 3, responding appropriately        Data   Results for orders placed or performed during the hospital encounter of 18 (from the past 24 hour(s))   CBC with platelets differential   Result Value Ref Range    WBC 7.1 4.0 - 11.0 10e9/L    RBC Count 3.78 (L) 4.4 - 5.9 10e12/L    Hemoglobin 10.9 (L) 13.3 - 17.7 g/dL    Hematocrit 34.0 (L) 40.0 - 53.0 %    MCV 90 78 - 100 fl    MCH 28.8 26.5 - 33.0 pg    MCHC 32.1 31.5 - 36.5 g/dL    RDW 22.8 (H) 10.0 - 15.0 %    Platelet Count 82 (L) 150 - 450 10e9/L    Diff Method Automated Method     % Neutrophils 86.8 %    % Lymphocytes 3.9 %    % Monocytes 8.8 %    % Eosinophils 0.0 %    % Basophils 0.4 %    % Immature Granulocytes 0.1 %    Nucleated RBCs 0 0 /100    Absolute Neutrophil 6.2 1.6 - 8.3 10e9/L    Absolute Lymphocytes 0.3 (L) 0.8 - 5.3 10e9/L    Absolute Monocytes 0.6 0.0 - 1.3 10e9/L    Absolute Eosinophils 0.0 0.0 - 0.7 10e9/L    Absolute Basophils 0.0 0.0 - 0.2 10e9/L    Abs Immature Granulocytes 0.0 0 - 0.4 10e9/L    Absolute Nucleated RBC 0.0    Basic metabolic panel   Result Value Ref Range    Sodium 135 133 - 144 mmol/L    Potassium 4.3 3.4 - 5.3 mmol/L     Chloride 103 94 - 109 mmol/L    Carbon Dioxide 24 20 - 32 mmol/L    Anion Gap 8 3 - 14 mmol/L    Glucose 104 (H) 70 - 99 mg/dL    Urea Nitrogen 31 (H) 7 - 30 mg/dL    Creatinine 1.91 (H) 0.66 - 1.25 mg/dL    GFR Estimate 34 (L) >60 mL/min/1.7m2    GFR Estimate If Black 42 (L) >60 mL/min/1.7m2    Calcium 8.1 (L) 8.5 - 10.1 mg/dL   Lactic acid   Result Value Ref Range    Lactic Acid 2.9 (H) 0.4 - 2.0 mmol/L   XR Chest 2 Views    Narrative    XR CHEST 2 VW 7/23/2018 2:14 PM    HISTORY: fever, cough, pneumonia;       Impression    IMPRESSION: Postoperative change. Shallow inspiration with some mild  atelectasis in the lung bases. Chest otherwise negative.    MIKO ALSTON MD   Lactic acid   Result Value Ref Range    Lactic Acid 1.8 0.7 - 2.0 mmol/L   UA with Microscopic   Result Value Ref Range    Color Urine Yellow     Appearance Urine Cloudy     Glucose Urine Negative NEG^Negative mg/dL    Bilirubin Urine Negative NEG^Negative    Ketones Urine Negative NEG^Negative mg/dL    Specific Gravity Urine 1.015 1.003 - 1.035    Blood Urine Moderate (A) NEG^Negative    pH Urine 6.0 5.0 - 7.0 pH    Protein Albumin Urine 100 (A) NEG^Negative mg/dL    Urobilinogen mg/dL 2.0 0.0 - 2.0 mg/dL    Nitrite Urine Negative NEG^Negative    Leukocyte Esterase Urine Large (A) NEG^Negative    Source Midstream Urine     WBC Urine >182 (H) 0 - 5 /HPF    RBC Urine >182 (H) 0 - 2 /HPF    WBC Clumps Present (A) NEG^Negative /HPF    Bacteria Urine Many (A) NEG^Negative /HPF    Squamous Epithelial /HPF Urine 2 (H) 0 - 1 /HPF    Transitional Epi <1 0 - 1 /HPF    Mucous Urine Present (A) NEG^Negative /LPF

## 2018-07-24 ENCOUNTER — ANESTHESIA (OUTPATIENT)
Dept: SURGERY | Facility: CLINIC | Age: 73
DRG: 698 | End: 2018-07-24
Payer: MEDICARE

## 2018-07-24 ENCOUNTER — APPOINTMENT (OUTPATIENT)
Dept: CT IMAGING | Facility: CLINIC | Age: 73
DRG: 698 | End: 2018-07-24
Attending: PHYSICIAN ASSISTANT
Payer: MEDICARE

## 2018-07-24 ENCOUNTER — PATIENT OUTREACH (OUTPATIENT)
Dept: CARE COORDINATION | Facility: CLINIC | Age: 73
End: 2018-07-24

## 2018-07-24 ENCOUNTER — APPOINTMENT (OUTPATIENT)
Dept: GENERAL RADIOLOGY | Facility: CLINIC | Age: 73
DRG: 698 | End: 2018-07-24
Attending: HOSPITALIST
Payer: MEDICARE

## 2018-07-24 ENCOUNTER — ANESTHESIA EVENT (OUTPATIENT)
Dept: SURGERY | Facility: CLINIC | Age: 73
DRG: 698 | End: 2018-07-24
Payer: MEDICARE

## 2018-07-24 LAB
ABO + RH BLD: NORMAL
ABO + RH BLD: NORMAL
ALBUMIN SERPL-MCNC: 2.6 G/DL (ref 3.4–5)
ALP SERPL-CCNC: 64 U/L (ref 40–150)
ALT SERPL W P-5'-P-CCNC: 17 U/L (ref 0–70)
ANION GAP SERPL CALCULATED.3IONS-SCNC: 11 MMOL/L (ref 3–14)
AST SERPL W P-5'-P-CCNC: 30 U/L (ref 0–45)
BACTERIA SPEC CULT: ABNORMAL
BASOPHILS # BLD AUTO: 0 10E9/L (ref 0–0.2)
BASOPHILS NFR BLD AUTO: 0.4 %
BILIRUB SERPL-MCNC: 1.3 MG/DL (ref 0.2–1.3)
BLD GP AB SCN SERPL QL: NORMAL
BLOOD BANK CMNT PATIENT-IMP: NORMAL
BUN SERPL-MCNC: 34 MG/DL (ref 7–30)
CALCIUM SERPL-MCNC: 7.3 MG/DL (ref 8.5–10.1)
CHLORIDE SERPL-SCNC: 106 MMOL/L (ref 94–109)
CO2 SERPL-SCNC: 18 MMOL/L (ref 20–32)
CREAT SERPL-MCNC: 1.67 MG/DL (ref 0.66–1.25)
DIFFERENTIAL METHOD BLD: ABNORMAL
EOSINOPHIL # BLD AUTO: 0 10E9/L (ref 0–0.7)
EOSINOPHIL NFR BLD AUTO: 0.4 %
ERYTHROCYTE [DISTWIDTH] IN BLOOD BY AUTOMATED COUNT: 22.8 % (ref 10–15)
GFR SERPL CREATININE-BSD FRML MDRD: 40 ML/MIN/1.7M2
GLUCOSE SERPL-MCNC: 86 MG/DL (ref 70–99)
HCT VFR BLD AUTO: 30.4 % (ref 40–53)
HGB BLD-MCNC: 10 G/DL (ref 13.3–17.7)
IMM GRANULOCYTES # BLD: 0 10E9/L (ref 0–0.4)
IMM GRANULOCYTES NFR BLD: 0.2 %
LYMPHOCYTES # BLD AUTO: 0.4 10E9/L (ref 0.8–5.3)
LYMPHOCYTES NFR BLD AUTO: 6.6 %
Lab: ABNORMAL
MCH RBC QN AUTO: 29.1 PG (ref 26.5–33)
MCHC RBC AUTO-ENTMCNC: 32.9 G/DL (ref 31.5–36.5)
MCV RBC AUTO: 88 FL (ref 78–100)
MONOCYTES # BLD AUTO: 0.9 10E9/L (ref 0–1.3)
MONOCYTES NFR BLD AUTO: 16.4 %
NEUTROPHILS # BLD AUTO: 4.2 10E9/L (ref 1.6–8.3)
NEUTROPHILS NFR BLD AUTO: 76 %
NRBC # BLD AUTO: 0 10*3/UL
NRBC BLD AUTO-RTO: 0 /100
PLATELET # BLD AUTO: 66 10E9/L (ref 150–450)
POTASSIUM SERPL-SCNC: 4.1 MMOL/L (ref 3.4–5.3)
PROT SERPL-MCNC: 6.1 G/DL (ref 6.8–8.8)
RBC # BLD AUTO: 3.44 10E12/L (ref 4.4–5.9)
SODIUM SERPL-SCNC: 135 MMOL/L (ref 133–144)
SPECIMEN EXP DATE BLD: NORMAL
SPECIMEN SOURCE: ABNORMAL
WBC # BLD AUTO: 5.5 10E9/L (ref 4–11)

## 2018-07-24 PROCEDURE — C2617 STENT, NON-COR, TEM W/O DEL: HCPCS | Performed by: UROLOGY

## 2018-07-24 PROCEDURE — 36000056 ZZH SURGERY LEVEL 3 1ST 30 MIN: Performed by: UROLOGY

## 2018-07-24 PROCEDURE — 86900 BLOOD TYPING SEROLOGIC ABO: CPT | Performed by: ANESTHESIOLOGY

## 2018-07-24 PROCEDURE — 74176 CT ABD & PELVIS W/O CONTRAST: CPT

## 2018-07-24 PROCEDURE — 37000009 ZZH ANESTHESIA TECHNICAL FEE, EACH ADDTL 15 MIN: Performed by: UROLOGY

## 2018-07-24 PROCEDURE — 40000169 ZZH STATISTIC PRE-PROCEDURE ASSESSMENT I: Performed by: UROLOGY

## 2018-07-24 PROCEDURE — 12000000 ZZH R&B MED SURG/OB

## 2018-07-24 PROCEDURE — 85025 COMPLETE CBC W/AUTO DIFF WBC: CPT | Performed by: HOSPITALIST

## 2018-07-24 PROCEDURE — C1769 GUIDE WIRE: HCPCS | Performed by: UROLOGY

## 2018-07-24 PROCEDURE — 87040 BLOOD CULTURE FOR BACTERIA: CPT | Performed by: PHYSICIAN ASSISTANT

## 2018-07-24 PROCEDURE — 86901 BLOOD TYPING SEROLOGIC RH(D): CPT | Performed by: ANESTHESIOLOGY

## 2018-07-24 PROCEDURE — 0TPD8DZ REMOVAL OF INTRALUMINAL DEVICE FROM URETHRA, VIA NATURAL OR ARTIFICIAL OPENING ENDOSCOPIC: ICD-10-PCS | Performed by: UROLOGY

## 2018-07-24 PROCEDURE — A9270 NON-COVERED ITEM OR SERVICE: HCPCS | Mod: GY | Performed by: INTERNAL MEDICINE

## 2018-07-24 PROCEDURE — 27210995 ZZH RX 272: Performed by: UROLOGY

## 2018-07-24 PROCEDURE — C1758 CATHETER, URETERAL: HCPCS | Performed by: UROLOGY

## 2018-07-24 PROCEDURE — 25000125 ZZHC RX 250: Performed by: NURSE ANESTHETIST, CERTIFIED REGISTERED

## 2018-07-24 PROCEDURE — 93005 ELECTROCARDIOGRAM TRACING: CPT

## 2018-07-24 PROCEDURE — 25000132 ZZH RX MED GY IP 250 OP 250 PS 637: Mod: GY | Performed by: HOSPITALIST

## 2018-07-24 PROCEDURE — 0T788DZ DILATION OF BILATERAL URETERS WITH INTRALUMINAL DEVICE, VIA NATURAL OR ARTIFICIAL OPENING ENDOSCOPIC: ICD-10-PCS | Performed by: UROLOGY

## 2018-07-24 PROCEDURE — 87077 CULTURE AEROBIC IDENTIFY: CPT | Performed by: PHYSICIAN ASSISTANT

## 2018-07-24 PROCEDURE — 86850 RBC ANTIBODY SCREEN: CPT | Performed by: ANESTHESIOLOGY

## 2018-07-24 PROCEDURE — 25500064 ZZH RX 255 OP 636: Performed by: UROLOGY

## 2018-07-24 PROCEDURE — 99232 SBSQ HOSP IP/OBS MODERATE 35: CPT | Performed by: PHYSICIAN ASSISTANT

## 2018-07-24 PROCEDURE — 93010 ELECTROCARDIOGRAM REPORT: CPT | Performed by: INTERNAL MEDICINE

## 2018-07-24 PROCEDURE — 25000566 ZZH SEVOFLURANE, EA 15 MIN: Performed by: UROLOGY

## 2018-07-24 PROCEDURE — 99207 ZZC CDG-MDM COMPONENT: MEETS LOW - DOWN CODED: CPT | Performed by: PHYSICIAN ASSISTANT

## 2018-07-24 PROCEDURE — G0463 HOSPITAL OUTPT CLINIC VISIT: HCPCS

## 2018-07-24 PROCEDURE — 36000058 ZZH SURGERY LEVEL 3 EA 15 ADDTL MIN: Performed by: UROLOGY

## 2018-07-24 PROCEDURE — 25000132 ZZH RX MED GY IP 250 OP 250 PS 637: Mod: GY | Performed by: INTERNAL MEDICINE

## 2018-07-24 PROCEDURE — 87186 SC STD MICRODIL/AGAR DIL: CPT | Performed by: PHYSICIAN ASSISTANT

## 2018-07-24 PROCEDURE — 80053 COMPREHEN METABOLIC PANEL: CPT | Performed by: HOSPITALIST

## 2018-07-24 PROCEDURE — 87800 DETECT AGNT MULT DNA DIREC: CPT | Performed by: PHYSICIAN ASSISTANT

## 2018-07-24 PROCEDURE — 25000128 H RX IP 250 OP 636: Performed by: NURSE ANESTHETIST, CERTIFIED REGISTERED

## 2018-07-24 PROCEDURE — 25000128 H RX IP 250 OP 636: Performed by: ANESTHESIOLOGY

## 2018-07-24 PROCEDURE — 27210794 ZZH OR GENERAL SUPPLY STERILE: Performed by: UROLOGY

## 2018-07-24 PROCEDURE — 71000012 ZZH RECOVERY PHASE 1 LEVEL 1 FIRST HR: Performed by: UROLOGY

## 2018-07-24 PROCEDURE — A9270 NON-COVERED ITEM OR SERVICE: HCPCS | Mod: GY | Performed by: HOSPITALIST

## 2018-07-24 PROCEDURE — 36415 COLL VENOUS BLD VENIPUNCTURE: CPT | Performed by: HOSPITALIST

## 2018-07-24 PROCEDURE — 40000277 XR SURGERY CARM FLUORO LESS THAN 5 MIN W STILLS: Mod: TC

## 2018-07-24 PROCEDURE — 25000128 H RX IP 250 OP 636: Performed by: HOSPITALIST

## 2018-07-24 PROCEDURE — 36415 COLL VENOUS BLD VENIPUNCTURE: CPT | Performed by: ANESTHESIOLOGY

## 2018-07-24 PROCEDURE — 37000008 ZZH ANESTHESIA TECHNICAL FEE, 1ST 30 MIN: Performed by: UROLOGY

## 2018-07-24 DEVICE — STENT URETERAL CONTOUR SOFT PERCUFLEX 6FRX28CM
Type: IMPLANTABLE DEVICE | Site: URETER | Status: NON-FUNCTIONAL
Removed: 2018-08-14

## 2018-07-24 RX ORDER — ONDANSETRON 4 MG/1
4 TABLET, ORALLY DISINTEGRATING ORAL EVERY 30 MIN PRN
Status: DISCONTINUED | OUTPATIENT
Start: 2018-07-24 | End: 2018-07-24 | Stop reason: HOSPADM

## 2018-07-24 RX ORDER — PROPOFOL 10 MG/ML
INJECTION, EMULSION INTRAVENOUS PRN
Status: DISCONTINUED | OUTPATIENT
Start: 2018-07-24 | End: 2018-07-24

## 2018-07-24 RX ORDER — SODIUM CHLORIDE, SODIUM LACTATE, POTASSIUM CHLORIDE, CALCIUM CHLORIDE 600; 310; 30; 20 MG/100ML; MG/100ML; MG/100ML; MG/100ML
INJECTION, SOLUTION INTRAVENOUS CONTINUOUS
Status: DISCONTINUED | OUTPATIENT
Start: 2018-07-24 | End: 2018-07-24 | Stop reason: HOSPADM

## 2018-07-24 RX ORDER — FENTANYL CITRATE 50 UG/ML
25-50 INJECTION, SOLUTION INTRAMUSCULAR; INTRAVENOUS EVERY 5 MIN PRN
Status: DISCONTINUED | OUTPATIENT
Start: 2018-07-24 | End: 2018-07-24 | Stop reason: HOSPADM

## 2018-07-24 RX ORDER — LABETALOL HYDROCHLORIDE 5 MG/ML
10 INJECTION, SOLUTION INTRAVENOUS
Status: DISCONTINUED | OUTPATIENT
Start: 2018-07-24 | End: 2018-07-24 | Stop reason: HOSPADM

## 2018-07-24 RX ORDER — LIDOCAINE HYDROCHLORIDE 20 MG/ML
INJECTION, SOLUTION INFILTRATION; PERINEURAL PRN
Status: DISCONTINUED | OUTPATIENT
Start: 2018-07-24 | End: 2018-07-24

## 2018-07-24 RX ORDER — NALOXONE HYDROCHLORIDE 0.4 MG/ML
.1-.4 INJECTION, SOLUTION INTRAMUSCULAR; INTRAVENOUS; SUBCUTANEOUS
Status: CANCELLED | OUTPATIENT
Start: 2018-07-24 | End: 2018-07-25

## 2018-07-24 RX ORDER — FENTANYL CITRATE 50 UG/ML
INJECTION, SOLUTION INTRAMUSCULAR; INTRAVENOUS PRN
Status: DISCONTINUED | OUTPATIENT
Start: 2018-07-24 | End: 2018-07-24

## 2018-07-24 RX ORDER — NALOXONE HYDROCHLORIDE 0.4 MG/ML
.1-.4 INJECTION, SOLUTION INTRAMUSCULAR; INTRAVENOUS; SUBCUTANEOUS
Status: DISCONTINUED | OUTPATIENT
Start: 2018-07-24 | End: 2018-07-24

## 2018-07-24 RX ORDER — EPHEDRINE SULFATE 50 MG/ML
INJECTION, SOLUTION INTRAMUSCULAR; INTRAVENOUS; SUBCUTANEOUS PRN
Status: DISCONTINUED | OUTPATIENT
Start: 2018-07-24 | End: 2018-07-24

## 2018-07-24 RX ORDER — HYDROMORPHONE HYDROCHLORIDE 1 MG/ML
.3-.5 INJECTION, SOLUTION INTRAMUSCULAR; INTRAVENOUS; SUBCUTANEOUS EVERY 5 MIN PRN
Status: DISCONTINUED | OUTPATIENT
Start: 2018-07-24 | End: 2018-07-24 | Stop reason: HOSPADM

## 2018-07-24 RX ORDER — ONDANSETRON 2 MG/ML
4 INJECTION INTRAMUSCULAR; INTRAVENOUS EVERY 30 MIN PRN
Status: DISCONTINUED | OUTPATIENT
Start: 2018-07-24 | End: 2018-07-24 | Stop reason: HOSPADM

## 2018-07-24 RX ORDER — DEXAMETHASONE SODIUM PHOSPHATE 4 MG/ML
INJECTION, SOLUTION INTRA-ARTICULAR; INTRALESIONAL; INTRAMUSCULAR; INTRAVENOUS; SOFT TISSUE PRN
Status: DISCONTINUED | OUTPATIENT
Start: 2018-07-24 | End: 2018-07-24

## 2018-07-24 RX ADMIN — DEXAMETHASONE SODIUM PHOSPHATE 4 MG: 4 INJECTION, SOLUTION INTRA-ARTICULAR; INTRALESIONAL; INTRAMUSCULAR; INTRAVENOUS; SOFT TISSUE at 16:26

## 2018-07-24 RX ADMIN — FLUTICASONE PROPIONATE 1 SPRAY: 50 SPRAY, METERED NASAL at 20:06

## 2018-07-24 RX ADMIN — FENTANYL CITRATE 50 MCG: 50 INJECTION, SOLUTION INTRAMUSCULAR; INTRAVENOUS at 16:24

## 2018-07-24 RX ADMIN — GABAPENTIN 600 MG: 600 TABLET, FILM COATED ORAL at 18:40

## 2018-07-24 RX ADMIN — PHENYLEPHRINE HYDROCHLORIDE 100 MCG: 10 INJECTION, SOLUTION INTRAMUSCULAR; INTRAVENOUS; SUBCUTANEOUS at 16:29

## 2018-07-24 RX ADMIN — TRAZODONE HYDROCHLORIDE 50 MG: 50 TABLET, FILM COATED ORAL at 21:29

## 2018-07-24 RX ADMIN — SODIUM CHLORIDE, POTASSIUM CHLORIDE, SODIUM LACTATE AND CALCIUM CHLORIDE: 600; 310; 30; 20 INJECTION, SOLUTION INTRAVENOUS at 15:44

## 2018-07-24 RX ADMIN — SODIUM CHLORIDE: 9 INJECTION, SOLUTION INTRAVENOUS at 01:28

## 2018-07-24 RX ADMIN — LIDOCAINE HYDROCHLORIDE 100 MG: 20 INJECTION, SOLUTION INFILTRATION; PERINEURAL at 16:24

## 2018-07-24 RX ADMIN — Medication 10 MG: at 16:38

## 2018-07-24 RX ADMIN — PHENYLEPHRINE HYDROCHLORIDE 150 MCG: 10 INJECTION, SOLUTION INTRAMUSCULAR; INTRAVENOUS; SUBCUTANEOUS at 16:32

## 2018-07-24 RX ADMIN — PIPERACILLIN SODIUM,TAZOBACTAM SODIUM 3.38 G: 3; .375 INJECTION, POWDER, FOR SOLUTION INTRAVENOUS at 08:19

## 2018-07-24 RX ADMIN — PIPERACILLIN SODIUM,TAZOBACTAM SODIUM 3.38 G: 3; .375 INJECTION, POWDER, FOR SOLUTION INTRAVENOUS at 20:02

## 2018-07-24 RX ADMIN — FENTANYL CITRATE 50 MCG: 50 INJECTION, SOLUTION INTRAMUSCULAR; INTRAVENOUS at 16:45

## 2018-07-24 RX ADMIN — PIPERACILLIN SODIUM,TAZOBACTAM SODIUM 3.38 G: 3; .375 INJECTION, POWDER, FOR SOLUTION INTRAVENOUS at 14:03

## 2018-07-24 RX ADMIN — PROPOFOL 140 MG: 10 INJECTION, EMULSION INTRAVENOUS at 16:24

## 2018-07-24 RX ADMIN — PHENYLEPHRINE HYDROCHLORIDE 50 MCG: 10 INJECTION, SOLUTION INTRAMUSCULAR; INTRAVENOUS; SUBCUTANEOUS at 16:28

## 2018-07-24 RX ADMIN — PIPERACILLIN SODIUM,TAZOBACTAM SODIUM 3.38 G: 3; .375 INJECTION, POWDER, FOR SOLUTION INTRAVENOUS at 01:29

## 2018-07-24 RX ADMIN — FLUTICASONE PROPIONATE 1 SPRAY: 50 SPRAY, METERED NASAL at 08:20

## 2018-07-24 RX ADMIN — SODIUM CHLORIDE: 9 INJECTION, SOLUTION INTRAVENOUS at 20:02

## 2018-07-24 RX ADMIN — GABAPENTIN 600 MG: 600 TABLET, FILM COATED ORAL at 21:30

## 2018-07-24 RX ADMIN — GABAPENTIN 600 MG: 600 TABLET, FILM COATED ORAL at 08:19

## 2018-07-24 RX ADMIN — PHENYLEPHRINE HYDROCHLORIDE 200 MCG: 10 INJECTION, SOLUTION INTRAMUSCULAR; INTRAVENOUS; SUBCUTANEOUS at 16:35

## 2018-07-24 ASSESSMENT — ACTIVITIES OF DAILY LIVING (ADL)
ADLS_ACUITY_SCORE: 11
ADLS_ACUITY_SCORE: 14
ADLS_ACUITY_SCORE: 15

## 2018-07-24 ASSESSMENT — ENCOUNTER SYMPTOMS: DYSRHYTHMIAS: 1

## 2018-07-24 NOTE — PLAN OF CARE
Problem: Patient Care Overview  Goal: Plan of Care/Patient Progress Review  Outcome: No Change  A/O, VSS, A1 w/walker and prosthetic for L BKA. Denies pain. NPO for cysto at 1500. Iv abx. drsg changes done on bilat arms, skin tears and R knee. Pos BC, ID consulted. Cont to monitor.

## 2018-07-24 NOTE — PROGRESS NOTES
Hold ASA     [facility]    Hospitalist Progress Note    Date of Service (when I saw the patient): 07/24/2018    Assessment & Plan   Antonio Ramirez is a 73 year old male with complex past history recent traumatic subdural hematoma (4/7/18), alcoholic cirrhosis, chronic kidney disease III, hypertension, CAD s/p CABG, atrial fibrillation, CHF, and hospitalization in May 2018 with Klebsiella bacteremia with bilateral ureteral stones and associated hydronephrosis s/p bilateral nephrostomy tube placement (5/22/18) and bilateral ureteral stent placement (5/24/18) among other chronic medical issues who presented to the ED on 7/23/18 with complaints of fevers and chills with Tmax reported at 103 degrees fahrenheit. Tmax 100.5 degrees fahrenheit overnight, remainder of vitals WNL. Pt currently stable.      Severe sepsis in the setting of bacteremia, gram negative rods per culture   Urinary tract infection   Lactic acidosis  History of bilateral nephrostomy tubes on 5/22, bilateral stent placement on 5/24: Hospitalization in May 2018 with Klebsiella bacteremia with bilateral ureteral stones and associated hydronephrosis s/p bilateral nephrostomy tube placement (5/22/18) and bilateral ureteral stent placement (5/24/18), treated with IV Rocephin throughout hospitalization, changed to Ceftin at discharge with plans to complete 2 weeks total. Creatinine 1.91 on admission. WBC WNL. UA with large LE and blood, >182 WBCs and RBCs. Urine culture with >100,000 colonies of lactose fermenting gram negative rods. Blood culture 1/2 from 7/23 turned positive with gram negative rods. Lactic acid 2.9>1.8 after IVF resuscitation.   -- Urology consulted, plan for cysto, right retrograde and right stent removal and left stent exchange on 7/24  -- Continue IV Zosyn   -- ID consulted due to recurrent bacteremia   -- Repeat blood culture 7/24   -- Follow urine culture   -- Monitor fever curve   -- CBC and BMP in the AM     Acute  on chronic kidney disease III: Most recent creatinine 1.2, prior to that 1.4-1.8. Creatinine on admission 1.91>1.67.   - Continue IVF at 75 ccs/hr  - Monitor I&Os, daily weights   - Hold torsemide at this time, low threshold to restart if pt demonstrating signs of fluid overload    Alcoholic cirrhosis  Alcohol abuse: He reports 2 drinks a day of whiskey. During last hospitalization, monitored per [facility] protocol, but did not demonstrate evidence of withdrawal.   - Monitor for signs of withdrawal     Acute on Chronic anemia and thrombocytopenia  Suspect due to alcoholic cirrhosis. Baseline hemoglobin approximately 10-11, baseline plt 130-150. Platelets 82>66, likely secondary to sepsis above.   -- Repeat CBC in the AM, monitor for signs of bleeding     Recent Labs  Lab 07/24/18  0815 07/23/18  1249   HGB 10.0* 10.9*      CAD status post CABG ×3 in 2007  Atrial fibrillation  Severe pulmonary HTN  Chronic compensated systolic congestive heart failure: Most recent echocardiogram from 7/11/18 with EF 40-45%, anterior, septal, and apical wall akinesis, moderate MR, severe pulmonary HTN (stable)  - stable currently  - Discontinue IVF as soon as possible   - Strict I&Os, daily weights   - Hold PTA Coreg 3.125 mg po BID given sepsis above. Pt not on ACE-I/ARB or statin per review.   - Low threshold to restart home torsemide 20 mg po qd    Intake/Output Summary (Last 24 hours) at 07/24/18 0855  Last data filed at 07/24/18 0600   Gross per 24 hour   Intake             2640 ml   Output              275 ml   Net             2365 ml      Multiple wounds abrasion on the right knee  He declined removal of his bandages  - wound nurse consultation     H/o recent traumatic subdural hematoma (4/2018)  Was restarted on his xarelto, but holding because of the planned urology instrumentation    History of DVT PE s/p IVC filter and Anticardiolipin Antibody: On xarelto normally, holding for 1 week prior to urological instrumentation  -  continue holding the xarelto, presumed restart after urological procedure     # Pain Assessment:  Current Pain Score 7/24/2018   Patient currently in pain? denies   Pain score (0-10) -   Pain location -   Pain descriptors -   CPOT pain score -   Antonio mendez pain level was assessed and he currently denies pain.      DVT Prophylaxis: Ambulate every shift  Code Status: Full Code    Disposition: Expected discharge pending clinical course.     Emely Ching PA-C    This patient was discussed with Dr. Best of the Hospitalist Service who agrees with current plans as outlined above.     Interval History   No acute events overnight. Tmax 100.5 degrees fahrenheit. No acute concerns. Denies N/V, abdominal pain, diarrhea.     -Data reviewed today: See below.     Physical Exam   Temp: 99.8  F (37.7  C) Temp src: Oral BP: 114/65 Pulse: 72 Heart Rate: 62 Resp: 16 SpO2: 92 % O2 Device: None (Room air)    Vitals:    07/23/18 1234   Weight: 94.8 kg (209 lb)     Vital Signs with Ranges  Temp:  [98.1  F (36.7  C)-101.1  F (38.4  C)] 99.8  F (37.7  C)  Pulse:  [62-72] 72  Heart Rate:  [62-71] 62  Resp:  [16] 16  BP: ()/(46-86) 114/65  SpO2:  [92 %-98 %] 92 %  I/O last 3 completed shifts:  In: 2640 [P.O.:240; I.V.:2400]  Out: 275 [Urine:275]    CONSTITUTIONAL: Pt laying in bed, dressed in hospital garb. Appears comfortable. Cooperative with interview. Accompanied by wife at bedside.  HEENT: Normocephalic, atraumatic. Negative for conjunctival redness or scleral icterus.    CARDIOVASCULAR: RRR, 2+ systolic murmur best appreciated in , rubs, or extra heart sounds appreciated. Pulses +2/4 and regular in upper and lower extremities, bilaterally.   RESPIRATORY: No increased work of breathing.  CTA, bilat; no wheezes, rales, or rhonchi appreciated.  GASTROINTESTINAL:  Abdomen soft, non-distended. BS auscultated in all four quadrants. Negative for tenderness to palpation.  No masses or organomegaly  noted.  MUSCULOSKELETAL: No gross deformities noted. Normal muscle tone.   HEMATOLOGIC/LYMPHATIC/IMMUNOLOGIC: No anterior or posterior cervical LAD, bilaterally. Negative for lower extremity edema, bilaterally.  NEUROLOGIC: Alert and oriented to person, place, and time.  strength intact.   SKIN: Left BKA.     Medications     sodium chloride 75 mL/hr at 07/24/18 0128       fluticasone  1 spray Both Nostrils BID     gabapentin  600 mg Oral TID     piperacillin-tazobactam  3.375 g Intravenous Q6H     traZODone  50 mg Oral At Bedtime       Data     Recent Labs  Lab 07/24/18  0815 07/23/18  1249   WBC 5.5 7.1   HGB 10.0* 10.9*   MCV 88 90   PLT 66* 82*   NA  --  135   POTASSIUM  --  4.3   CHLORIDE  --  103   CO2  --  24   BUN  --  31*   CR  --  1.91*   ANIONGAP  --  8   BENNIE  --  8.1*   GLC  --  104*       Recent Results (from the past 24 hour(s))   XR Chest 2 Views    Narrative    XR CHEST 2 VW 7/23/2018 2:14 PM    HISTORY: fever, cough, pneumonia;       Impression    IMPRESSION: Postoperative change. Shallow inspiration with some mild  atelectasis in the lung bases. Chest otherwise negative.    MIKO ALSTON MD

## 2018-07-24 NOTE — PROVIDER NOTIFICATION
MD Notification    Notified Person: MD    Notified Person Name:  Carley    Notification Date/Time: 2:08 PM    Notification Interaction: LM w/ his nurse    Purpose of Notification: plt 66    Orders Received:    Comments: ok to proceed with procedure

## 2018-07-24 NOTE — PLAN OF CARE
Problem: Patient Care Overview  Goal: Plan of Care/Patient Progress Review  PT: Pt to have a cystoscopy and exchange of ureter stent this afternoon. Will hold therapy today for optimal evaluation and recommendations post procedure.

## 2018-07-24 NOTE — PROVIDER NOTIFICATION
MD Notification    Notified Person: MD    Notified Person Name: Emely MCDANIEL    Notification Date/Time: 12:01 PM    Notification Interaction: text page    Purpose of Notification: positive BC    Orders Received:    Comments:

## 2018-07-24 NOTE — PROGRESS NOTES
Discussed with Dr Cervantes antibiotic ancef ordered for surgery. Plan is continue with scheduled antibiotics.

## 2018-07-24 NOTE — PROGRESS NOTES
UROLOGY  F/U FEBRILE EPISODE, IMPENDING SEPSIS, LIKELY UTI  FEELS MUCH IMPROVED AFTER IV HYDRATION AND IV ABX  AVSS  DECREASED URINE OUTPUT BUT IMPROVING  LABS PENDING  ABD , FLANK OK  IMPRESSION:  LIKELY UTI WITH IMPENDING SEPSIS  BILATERAL URETERAL STENTS AND RECENT UTI,STONES, UROLOGIC PROCEDURES  PLAN:  WILL PLAN CYSTO,RIGHT RETROGRADE AND RIGHT STENT REMOVAL TODAY  LEFT STENT CHANGE TODAY   HOME Wednesday AM? ON ORAL ABX 2 WEEKS  LEFT URETEROSCOPY AND PYELOSCOPY WITH LASER LITHOTRIPSY 10-14 DAYS?

## 2018-07-24 NOTE — BRIEF OP NOTE
[facility] [place]    Brief Operative Note    Pre-operative diagnosis: BILATERAL  KIDNEY STONES, UTI  Post-operative diagnosis same  Procedure: Cystoscopy, bilateral retrograde pyelograms, bilateral ureteral stent exchange    Surgeon: Surgeon(s) and Role:  Panel 1:     * Matt Cervantes MD - Primary    Anesthesia: General   Estimated blood loss: None  Drains: Right and left 6F x 28 cm ureteral stents  Specimens: * No specimens in log *  Findings:   minimal hydro bilaterally, narrow right UPJ, multiple stones projecting over left kidney.  Complications: None.  Implants: None.  Follow-up: cont Abx for UTI, f/u with Dr Hunter in next few weeks for ureteroscopy

## 2018-07-24 NOTE — PROGRESS NOTES
"Clinic Care Coordination Contact    Data: Per chart review, patient was admitted to [facility] yesterday, 7/23/18, where patient remains to date. Per H&P (in part): \"Antonio Ramirez is a 73 year old male who presents with fever and chills that started 2 days ago, reaching 103 degrees......He presented to his urologists office today, who sent him to the emergency room because of the fever.\"  Plan: -[facility] will follow for possible need for community resources post hospitalization.    TREMAYNE Torrez  [facility] Care Coordination  Clinic: [place]   Email: [email or web address]  Tele: [phone]          "

## 2018-07-24 NOTE — PLAN OF CARE
Problem: Patient Care Overview  Goal: Plan of Care/Patient Progress Review  Outcome: No Change  A&Ox4. Fall risk-assist of 1. Needs a walker. Left BKA-prothesis on. Generalized weakness. Occasional incontinence. Denies pain. Receiving IV abx and IV fluids. NPO after midnight-stent exchange tomorrow-per Urology. Denies pain. Requested trazodone for sleep.

## 2018-07-24 NOTE — ANESTHESIA CARE TRANSFER NOTE
Patient: Antonio Ramirez    Procedure(s):  CYSTOSCOPY, BILATERAL RETROGRADES, BILATERAL URETERAL STENT EXCHANGE  - Wound Class: II-Clean Contaminated   - Wound Class: II-Clean Contaminated    Diagnosis: BILATERAL  KIDNEY STONES   Diagnosis Additional Information: No value filed.    Anesthesia Type:   General, LMA     Note:  Airway :Face Mask  Patient transferred to:PACU  Comments: To recovery, Shonda Report: Identifed the Patient, Identified the Reponsible Provider, Reviewed the pertinent medical history, Discussed the surgical course, Reviewed Intra-OP anesthesia mangement and issues during anesthesia, Set expectations for post-procedure period and Allowed opportunity for questions and acknowledgement of understanding      Vitals: (Last set prior to Anesthesia Care Transfer)    CRNA VITALS  7/24/2018 1627 - 7/24/2018 1705      7/24/2018             Pulse: 88    SpO2: 100 %    Resp Rate (observed): (!)  4                Electronically Signed By: DIPAK Martinez CRNA  July 24, 2018  5:05 PM

## 2018-07-24 NOTE — CONSULTS
ID consult dictated IMP 1 74 yo male acute GNR bacteremia, urinary source likely    REC continue same await cxs and adjust

## 2018-07-24 NOTE — PROGRESS NOTES
[facility] Nurse Inpatient Wound Assessment     Initial Assessment of wound(s) on pt's:   Right knee, bilateral forearms        Data:   Patient History:      per MD note(s): Antonio Ramirez is a 73 year old male who presents with fever, chills       Ottoniel Assessment and sub scores:   Ottoniel Score  Av.3  Min: 18  Max: 20    Positioning: Pillows    Mattress:  Standard , Atmos Air mattress       Current Diet / Nutrition:           Active Diet Order      NPO per Anesthesia Guidelines for Procedure/Surgery Except for: Meds    Labs:   Recent Labs   Lab Test  18   0815   18   0700   18   1641   ALBUMIN  2.6*   --    --    < >   --    HGB  10.0*   < >  9.0*   < >  10.6*   INR   --    --   1.68*   --    --    WBC  5.5   < >  5.4   < >  4.4   A1C   --    --    --    --   4.6    < > = values in this interval not displayed.       Wound Assessment:  Wound History:  Pt reports recent fall where he skinned his knee and arms.  States has been on Xarelto and bleeding easily.  Prior left BKA.      Right kneecap area:  4 x 4 x 0.1cm abrasion, pink-yellow moist slick dermis, superficial.  Wound bed clean but weepy.  Periwound intact, no erythema.    Right anterior forearm:  Large area of scattered bruising the length of forearm.  Within this ecchymotic area is an approx 9 x 2 x 0.1cm open area/ skin tear to distal/medial aspect, and some small patchy open spots to proximal anterior.  Tissue is moist pale pink dermis.  Minimal skin flaps present.  Small serosang drainage.  Tender with dressing removal.      Right anterior forearm, just proximal to wrist:  Approx. 4 x 4 x 0.1cm skin tear, pale pink-yellow moist tissue, minimal skin flap at distal edge.  Periwound intact, mild bruising.  Small serosang drainage, minimal pain.           Intervention:     Patient's chart evaluated.      Wound(s) assessed    Wound Care: cleansed and redressed wounds    Orders  Written    Supplies  reviewed and  gathered    Discussed plan of care with Patient and Family and nurse             All patient / family questions answered           Assessment:          Skin tears to forearms and abrasion to right knee are all superficial and without s/s infection.  No signs of continued bleeding or active hematoma formation.           Plan:     Nursing to notify the Provider(s) and re-consult the [facility] Nurse if wound(s) deteriorate(s) or if the wound care plan needs reevaluation.      Plan of care for wounds located on bilateral forearms, right knee: every other day and prn:  1.  Cleanse with MicroKlenz.  Moisten right arm dressing as needed to help release.    2.  Right arm:  Cover with adaptic, gauze, Xavi.   3.  Left arm and right knee:  Cover with PolyMem strip, trimming off or folding back the tape edges.  Secure the pink foam over the wound with a Mepilex 4x4.    4.  Label dressings with time/date/initials.      [facility] Nurse will return: weekly and prn

## 2018-07-24 NOTE — OP NOTE
Procedure Date: 07/24/2018      PREPROCEDURE DIAGNOSIS:  History of bilateral ureteral calculi with indwelling ureteral stents and sepsis.      POSTPROCEDURE DIAGNOSES:  History of bilateral ureteral calculi with indwelling ureteral stents and sepsis.      PROCEDURE PERFORMED:   1.  Cystoscopy with bilateral retrograde pyelograms.   2.  Intraoperative interpretation of fluoroscopic images.   3.  Bilateral ureteral stent exchange.      OPERATIVE INDICATION:  The patient is a 73-year-old man who presented a while back with bilateral ureteral stones.  About 3 weeks ago he underwent right ureteroscopy by Dr. Hunter.  His left ureteral and renal stones were left in situ.  He also had bilateral ureteral stents placed.  Yesterday he presented with fever and urinary tract infection and due to concern for sepsis, he was advised to undergo ureteral stent exchange after approximately 24 hours of appropriate antibiotic coverage with the intent of removing the ureteral stents which serve as foreign bodies and are likely colonized.  A CT scan was obtained preoperatively which showed moderate edema around the ureters and kidneys on both sides.  He appears to be stone free in the right and has a residual left ureteral stone as well as several nonobstructing left renal stones.      DESCRIPTION OF PROCEDURE:  The patient was counseled to undergo bilateral stent exchange to reduce the bacterial burden.  Removal of the right ureteral stent was considered as he is stone free on this side, but due to the presence of the edema seen on CT scan I advised against this as he could become obstructed while he has a UTI if the stent was removed.  After understanding various management options, he elected to undergo today's procedure.      DESCRIPTION OF PROCEDURE:  After properly identifying the patient and verifying informed consent, he was brought to the operating room in stable condition.  After sufficient induction of anesthetic, he was  placed in lithotomy position.  His genitalia were prepped and draped in the usual fashion.  The case was begun by inserting a well-lubricated cystoscope in the patient's bladder under direct vision.  The urethra and prostate were nonobstructing.  The bladder was surveyed and the bladder media was slightly turbid.  Both ureteral stents were brought out through the meatus with a grasper and through each a sensor guidewire was advanced and then the stents discarded.  There was minimal encrustation.      On the left side, I advanced a 6-Sammarinese open-ended catheter over the wire, injected contrast and performed a left retrograde pyelogram.  There was minimal hydronephrosis.  There were no filling defects and there was no extravasation noted.  On  imaging there were numerous calcifications over the mid and lower pole calices.  I did not visualize well the left mid ureteral stone that was seen on CT scan.  I then placed a 6-Sammarinese x 28 cm double-J ureteral stent over the guidewire with the assistance of fluoroscopy.  Excellent proximal and distal coils were visualized.      I then turned my attention to the right side and placed a 6-Sammarinese catheter over the wire, which was then removed.  I injected contrast to perform a right retrograde pyelogram.  There was minimal hydronephrosis on the right; however, there was a 1 cm segment of narrowing at the right ureteropelvic junction, possibly from persistent edema versus ureteral spasm.  There were no stones seen on  imaging.  There was no extravasation and there were no filling defects seen.  Because of the presence of this edema and concern for continued obstruction in the setting of infection, I did elect to replace the right ureteral stent.  A new 6-Sammarinese x 28 cm double-J ureteral stent was placed over the guidewire with the assistance of fluoroscopy.  Excellent proximal distal coils were visualized.  At this point, the patient's bladder was then emptied.  He was  awoken from anesthesia, brought to recovery in stable condition.  There is no blood loss, there are no apparent complications.      The patient will follow up with Dr. Hunter the next several weeks for definitive management of his left ureteral stones and removal of his right ureteral stent.         LESTER GARCIA MD             D: 2018   T: 2018   MT: RADAMES      Name:     GALILEA LUGO   MRN:      [id number]        Account:        [id number]   :      1945           Procedure Date: 2018      Document: [id number]

## 2018-07-24 NOTE — ANESTHESIA PREPROCEDURE EVALUATION
Anesthesia Evaluation     .             ROS/MED HX    ENT/Pulmonary:      (-) sleep apnea   Neurologic:     (+)other neuro Recent sub-dural hematoma;    Cardiovascular:     (+) hypertension--CAD, -CABG-. Taking blood thinners : . CHF Last EF: 40-45% . fainting (syncope). :. dysrhythmias a-fib, valvular problems/murmurs type: MR and AS Moderate MR; Moderate AS;:. pulmonary hypertension,       METS/Exercise Tolerance:     Hematologic: Comments: Also has IVC filter in place;    (+) History of blood clots pt is anticoagulated, Other Hematologic Disorder-Thrombocytopenia;      Musculoskeletal:   (+) , , other musculoskeletal- Amputated left lower leg;      GI/Hepatic: Comment: Hx alcoholic cirrhosis;    (+) GERD hiatal hernia, liver disease,       Renal/Genitourinary:     (+) chronic renal disease, type: ARF and CRI, Pt does not require dialysis, Pt has no history of transplant,       Endo:         Psychiatric:         Infectious Disease:         Malignancy:   (+) Malignancy History of Prostate          Other:                     Physical Exam  Normal systems: cardiovascular, pulmonary and dental    Airway   Mallampati: II  TM distance: >3 FB  Neck ROM: full    Dental     Cardiovascular       Pulmonary                     Anesthesia Plan      History & Physical Review  History and physical reviewed and following examination; no interval change.    ASA Status:  4 .    NPO Status:  > 8 hours    Plan for General and LMA with Intravenous induction. Maintenance will be Balanced.    PONV prophylaxis:  Ondansetron (or other 5HT-3)       Postoperative Care      Consents  Anesthetic plan, risks, benefits and alternatives discussed with:  Patient..                          .

## 2018-07-24 NOTE — PROGRESS NOTES
Pt brought his watch, ok to send it to pacu. Pt has on his left artificial limb on. Dr Bañuelos talked with pt about removing the limp. Pt wishes to keep it on during procedure.

## 2018-07-24 NOTE — PLAN OF CARE
Problem: Patient Care Overview  Goal: Plan of Care/Patient Progress Review  Outcome: Improving  Patient had VSS, denied any chest pain and SOB. Patient denied having any pain. Urinated about 200 ml of dalia clear urine. NPO since midnight. Scheduled for cystoscopy at 1530. Patient has bandages on both wrists/arms, unwilling to let RN take a look. Bilateral knee skin tears, WOC RN consulted. Upset with being in hospital. L BKA, prothesis at bedside. Continue to monitor.

## 2018-07-25 ENCOUNTER — APPOINTMENT (OUTPATIENT)
Dept: PHYSICAL THERAPY | Facility: CLINIC | Age: 73
DRG: 698 | End: 2018-07-25
Attending: PHYSICIAN ASSISTANT
Payer: MEDICARE

## 2018-07-25 VITALS
DIASTOLIC BLOOD PRESSURE: 73 MMHG | HEART RATE: 72 BPM | RESPIRATION RATE: 14 BRPM | BODY MASS INDEX: 28.07 KG/M2 | HEIGHT: 74 IN | TEMPERATURE: 97.6 F | SYSTOLIC BLOOD PRESSURE: 120 MMHG | OXYGEN SATURATION: 96 % | WEIGHT: 218.7 LBS

## 2018-07-25 LAB
ANION GAP SERPL CALCULATED.3IONS-SCNC: 9 MMOL/L (ref 3–14)
BASOPHILS # BLD AUTO: 0 10E9/L (ref 0–0.2)
BASOPHILS NFR BLD AUTO: 0 %
BUN SERPL-MCNC: 31 MG/DL (ref 7–30)
CALCIUM SERPL-MCNC: 7.2 MG/DL (ref 8.5–10.1)
CHLORIDE SERPL-SCNC: 107 MMOL/L (ref 94–109)
CO2 SERPL-SCNC: 21 MMOL/L (ref 20–32)
CREAT SERPL-MCNC: 1.4 MG/DL (ref 0.66–1.25)
DIFFERENTIAL METHOD BLD: ABNORMAL
EOSINOPHIL # BLD AUTO: 0 10E9/L (ref 0–0.7)
EOSINOPHIL NFR BLD AUTO: 0 %
ERYTHROCYTE [DISTWIDTH] IN BLOOD BY AUTOMATED COUNT: 22.4 % (ref 10–15)
GFR SERPL CREATININE-BSD FRML MDRD: 49 ML/MIN/1.7M2
GLUCOSE BLDC GLUCOMTR-MCNC: 153 MG/DL (ref 70–99)
GLUCOSE BLDC GLUCOMTR-MCNC: 169 MG/DL (ref 70–99)
GLUCOSE SERPL-MCNC: 149 MG/DL (ref 70–99)
HCT VFR BLD AUTO: 31.6 % (ref 40–53)
HGB BLD-MCNC: 10.3 G/DL (ref 13.3–17.7)
IMM GRANULOCYTES # BLD: 0 10E9/L (ref 0–0.4)
IMM GRANULOCYTES NFR BLD: 0.3 %
LYMPHOCYTES # BLD AUTO: 0.3 10E9/L (ref 0.8–5.3)
LYMPHOCYTES NFR BLD AUTO: 8.1 %
MCH RBC QN AUTO: 29.1 PG (ref 26.5–33)
MCHC RBC AUTO-ENTMCNC: 32.6 G/DL (ref 31.5–36.5)
MCV RBC AUTO: 89 FL (ref 78–100)
MONOCYTES # BLD AUTO: 0.3 10E9/L (ref 0–1.3)
MONOCYTES NFR BLD AUTO: 7.9 %
NEUTROPHILS # BLD AUTO: 3.3 10E9/L (ref 1.6–8.3)
NEUTROPHILS NFR BLD AUTO: 83.7 %
NRBC # BLD AUTO: 0 10*3/UL
NRBC BLD AUTO-RTO: 0 /100
PLATELET # BLD AUTO: 76 10E9/L (ref 150–450)
POTASSIUM SERPL-SCNC: 4.2 MMOL/L (ref 3.4–5.3)
RBC # BLD AUTO: 3.54 10E12/L (ref 4.4–5.9)
SODIUM SERPL-SCNC: 137 MMOL/L (ref 133–144)
WBC # BLD AUTO: 3.9 10E9/L (ref 4–11)

## 2018-07-25 PROCEDURE — 97161 PT EVAL LOW COMPLEX 20 MIN: CPT | Mod: GP

## 2018-07-25 PROCEDURE — 25000128 H RX IP 250 OP 636: Performed by: INTERNAL MEDICINE

## 2018-07-25 PROCEDURE — A9270 NON-COVERED ITEM OR SERVICE: HCPCS | Mod: GY | Performed by: HOSPITALIST

## 2018-07-25 PROCEDURE — 99207 ZZC APP CREDIT; MD BILLING SHARED VISIT: CPT | Performed by: NURSE PRACTITIONER

## 2018-07-25 PROCEDURE — 80048 BASIC METABOLIC PNL TOTAL CA: CPT | Performed by: PHYSICIAN ASSISTANT

## 2018-07-25 PROCEDURE — 25000132 ZZH RX MED GY IP 250 OP 250 PS 637: Mod: GY | Performed by: HOSPITALIST

## 2018-07-25 PROCEDURE — 00000146 ZZHCL STATISTIC GLUCOSE BY METER IP

## 2018-07-25 PROCEDURE — 97116 GAIT TRAINING THERAPY: CPT | Mod: GP

## 2018-07-25 PROCEDURE — 36415 COLL VENOUS BLD VENIPUNCTURE: CPT | Performed by: PHYSICIAN ASSISTANT

## 2018-07-25 PROCEDURE — 40000193 ZZH STATISTIC PT WARD VISIT

## 2018-07-25 PROCEDURE — 85025 COMPLETE CBC W/AUTO DIFF WBC: CPT | Performed by: PHYSICIAN ASSISTANT

## 2018-07-25 PROCEDURE — 97110 THERAPEUTIC EXERCISES: CPT | Mod: GP

## 2018-07-25 PROCEDURE — 99239 HOSP IP/OBS DSCHRG MGMT >30: CPT | Performed by: PHYSICIAN ASSISTANT

## 2018-07-25 PROCEDURE — 25000128 H RX IP 250 OP 636: Performed by: HOSPITALIST

## 2018-07-25 RX ORDER — SULFAMETHOXAZOLE/TRIMETHOPRIM 800-160 MG
1 TABLET ORAL 2 TIMES DAILY
Qty: 42 TABLET | Refills: 0 | Status: SHIPPED | OUTPATIENT
Start: 2018-07-25 | End: 2018-08-15

## 2018-07-25 RX ORDER — CEFTRIAXONE 2 G/1
2 INJECTION, POWDER, FOR SOLUTION INTRAMUSCULAR; INTRAVENOUS EVERY 24 HOURS
Status: DISCONTINUED | OUTPATIENT
Start: 2018-07-25 | End: 2018-07-25 | Stop reason: HOSPADM

## 2018-07-25 RX ADMIN — PIPERACILLIN SODIUM,TAZOBACTAM SODIUM 3.38 G: 3; .375 INJECTION, POWDER, FOR SOLUTION INTRAVENOUS at 07:55

## 2018-07-25 RX ADMIN — CEFTRIAXONE SODIUM 2 G: 2 INJECTION, POWDER, FOR SOLUTION INTRAMUSCULAR; INTRAVENOUS at 12:10

## 2018-07-25 RX ADMIN — PIPERACILLIN SODIUM,TAZOBACTAM SODIUM 3.38 G: 3; .375 INJECTION, POWDER, FOR SOLUTION INTRAVENOUS at 02:28

## 2018-07-25 RX ADMIN — FLUTICASONE PROPIONATE 1 SPRAY: 50 SPRAY, METERED NASAL at 08:01

## 2018-07-25 RX ADMIN — GABAPENTIN 600 MG: 600 TABLET, FILM COATED ORAL at 08:01

## 2018-07-25 ASSESSMENT — ACTIVITIES OF DAILY LIVING (ADL)
ADLS_ACUITY_SCORE: 14

## 2018-07-25 NOTE — PLAN OF CARE
Problem: Patient Care Overview  Goal: Plan of Care/Patient Progress Review  Outcome: No Change  VSS on RA. SBA to BR, uses bedside urinal as well. IVF infusing with IV abx, new IV d/t pt removal during confused state. Pt more alert now. LS clear. L BKA. No c/o pain, dizziness, SOB, or CP. Pt had stent exchange and cysto yesterday 7/24. CMS and Neuros intact. Urology/ID/PT/SW/WOC following.

## 2018-07-25 NOTE — PROVIDER NOTIFICATION
MD Notification    Notified Person: MD    Notified Person Name: Emely Ibarrayisel LIU    Notification Date/Time: 7/25/2018 2:07 PM    Notification Interaction:    Purpose of Notification: Updated Emely that BC growing staph species.     Orders Received:    Comments:

## 2018-07-25 NOTE — PROGRESS NOTES
[facility]SA     [facility]    Hospitalist Progress Note    Date of Service (when I saw the patient): 07/25/2018    Assessment & Plan   Antonio Ramirez is a 73 year old male with complex past history recent traumatic subdural hematoma (4/7/18), alcoholic cirrhosis, chronic kidney disease III, hypertension, CAD s/p CABG, atrial fibrillation, CHF, and hospitalization in May 2018 with Klebsiella bacteremia with bilateral ureteral stones and associated hydronephrosis s/p bilateral nephrostomy tube placement (5/22/18) and bilateral ureteral stent placement (5/24/18) among other chronic medical issues who presented to the ED on 7/23/18 with complaints of fevers and chills with Tmax reported at 103 degrees fahrenheit. Afebrile overnight, vitals WNL. Pt currently stable.      Severe sepsis in the setting of bacteremia, Klebsiella pneumoniae with most consistent source being urinary tract infection with associated infected ureteral stones and bilateral stents  S/P cystoscopy with bilateral retrograde pyelograms and bilateral stent exchange (7/24/18)  Lactic acidosis, resolved  History of bilateral nephrostomy tubes on 5/22, bilateral stent placement on 5/24: Hospitalization in May 2018 with Klebsiella bacteremia with bilateral ureteral stones and associated hydronephrosis s/p bilateral nephrostomy tube placement (5/22/18) and bilateral ureteral stent placement (5/24/18), treated with IV Rocephin throughout hospitalization, changed to Ceftin at discharge with plans to complete 2 weeks total. Creatinine 1.91 on admission. WBC WNL. UA with large LE and blood, >182 WBCs and RBCs. Urine culture with >100,000 colonies of Klebsiella pneumoniae sensitive to cipro and bactrim. Blood culture with gram negative rods from 7/23, repeat BC on 7/24 NGTD. Lactic acid 2.9>1.8 after IVF resuscitation. CT A/P with 3 mm stone adjacent to the stent approximately at the level of the crossing of the iliac vasculature on the left.  Stone or cluster of stones in the interior pole of the left kidney measuring 1.8 cm.   -- Urology consulted, pt underwent procedure (as above) on 7/24; tentative plan per op note indicates f/u with Dr. Hunter in the next several weeks for definitive management of his left ureteral stones and removal of his right ureteral stent. Will need formal recommendations on timing of procedure, follow-up instructions, and recommendations on restarting Xarelto and when to hold in anticipation of upcoming procedure.   -- ID following, Zosyn switched to Ceftriaxone on 7/25. Will need to follow blood culture, likely transition to fluoroquinolone or Bactrim at discharge, appreciate formal recommendations once culture resulted  -- Pt prefers to discharge today (7/25) to home, will have to await cultures, get formal antibiotic recommendations by ID and plan for future urology procedure with follow-up.     Transient encephalopathy, resolved: RRT called overnight. Pt was awoken by bedside RN and was unable to remember his name, location, or situation. Pt became anxious and slightly combative. After approximately 5 minutes, symptoms resolved. No focal neuro deficits noted. This AM, on my interview, pt is oriented X4. No appreciable neuro deficits. Hx of alcohol abuse, but not exhibiting signs of withdrawal.   -- Monitor      Acute on chronic kidney disease III: Most recent creatinine 1.2, prior to that 1.4-1.8. Creatinine on admission 1.91>1.67>1.40.   - Discontinue IVF on 7/25  - Monitor I&Os, daily weights   - Hold torsemide at this time, low threshold to restart if pt demonstrating signs of fluid overload, resume at discharge     Alcoholic cirrhosis  Alcohol abuse: He reports 2 drinks a day of whiskey. During last hospitalization, monitored per CIWA protocol, but did not demonstrate evidence of withdrawal.   - Monitor for signs of withdrawal     Acute on Chronic anemia and thrombocytopenia  Suspect due to alcoholic cirrhosis.  Baseline hemoglobin approximately 10-11, baseline plt 130-150. Platelets 82>66>76, likely secondary to sepsis above.     Recent Labs  Lab 07/25/18  0841 07/24/18  0815 07/23/18  1249   HGB 10.3* 10.0* 10.9*      CAD status post CABG ×3 in 2007  Atrial fibrillation, chronic ([id number] Score of 6)  Severe pulmonary HTN  Chronic compensated systolic congestive heart failure: Most recent echocardiogram from 7/11/18 with EF 40-45%, anterior, septal, and apical wall akinesis, moderate MR, severe pulmonary HTN (stable)  - stable currently  - Strict I&Os, daily weights   - Hold PTA Coreg 3.125 mg po BID, pt bradycardic this AM, likely resume at discharge based on HR's   - Low threshold to restart home torsemide 20 mg po every day  - Restart Xarelto per Urology    Intake/Output Summary (Last 24 hours) at 07/25/18 0913  Last data filed at 07/24/18 2002   Gross per 24 hour   Intake             1610 ml   Output              350 ml   Net             1260 ml     Multiple wounds abrasion on the right knee, right anterior forearm  - Wound nurse consultation     H/o recent traumatic subdural hematoma (4/2018): Since that hospitalization, pt has been restarted on PTA Xarelto     History of DVT PE s/p IVC filter and Anticardiolipin Antibody: On xarelto normally, holding for 1 week prior to urological instrumentation  - Xarelto held on admission in anticipation of procedure, likely restart at discharge, but will need instruction per Urology on when to hold prior to upcoming planned procedure      # Pain Assessment:  Current Pain Score 7/25/2018   Patient currently in pain? denies   Pain score (0-10) -   Pain location -   Pain descriptors -   CPOT pain score -   Antonio mendez pain level was assessed and he currently denies pain.      DVT Prophylaxis: Ambulate every shift  Code Status: Full Code    Disposition: Expected discharge today or 7/26    Emely Ching PA-C    This patient was discussed with Dr. Nicholson of the  Hospitalist Service who agrees with current plans as outlined above.     Interval History   RRT overnight for confusion (see above), since resolved. Afebrile. Followed by ID and Urology. Anxious to discharge ASAP, hoping today.     -Data reviewed today: See below.     Physical Exam   Temp: 97.6  F (36.4  C) Temp src: Oral BP: 120/73   Heart Rate: 53 Resp: 14 SpO2: 96 % O2 Device: None (Room air) Oxygen Delivery: 2 LPM  Vitals:    07/23/18 1234 07/25/18 0614   Weight: 94.8 kg (209 lb) 99.2 kg (218 lb 11.1 oz)     Vital Signs with Ranges  Temp:  [97  F (36.1  C)-98.4  F (36.9  C)] 97.6  F (36.4  C)  Heart Rate:  [53-83] 53  Resp:  [11-17] 14  BP: (109-141)/(61-94) 120/73  SpO2:  [95 %-99 %] 96 %  I/O last 3 completed shifts:  In: 1610 [P.O.:360; I.V.:1250]  Out: 350 [Urine:350]    CONSTITUTIONAL: Pt laying in bed, dressed in hospital garb. Appears comfortable. Cooperative with interview. Accompanied by wife at bedside.  HEENT: Normocephalic, atraumatic. Negative for conjunctival redness or scleral icterus.    CARDIOVASCULAR: RRR, 2+ systolic murmur best appreciated in , rubs, or extra heart sounds appreciated. Pulses +2/4 and regular in upper and lower extremities, bilaterally.   RESPIRATORY: No increased work of breathing.  CTA, bilat; no wheezes, rales, or rhonchi appreciated.  GASTROINTESTINAL:  Abdomen soft, non-distended. BS auscultated in all four quadrants. Negative for tenderness to palpation.  No masses or organomegaly noted.  MUSCULOSKELETAL: No gross deformities noted. Normal muscle tone.   HEMATOLOGIC/LYMPHATIC/IMMUNOLOGIC: No anterior or posterior cervical LAD, bilaterally. Negative for lower extremity edema, bilaterally.  NEUROLOGIC: Alert and oriented to person, place, and time.  strength intact.   SKIN: Left BKA.     Medications       cefTRIAXone  2 g Intravenous Q24H     fluticasone  1 spray Both Nostrils BID     gabapentin  600 mg Oral TID     traZODone  50 mg Oral At Bedtime     Data     Recent  Labs  Lab 07/25/18  0841 07/24/18  0815 07/23/18  1249   WBC 3.9* 5.5 7.1   HGB 10.3* 10.0* 10.9*   MCV 89 88 90   PLT 76* 66* 82*    135 135   POTASSIUM 4.2 4.1 4.3   CHLORIDE 107 106 103   CO2 21 18* 24   BUN 31* 34* 31*   CR 1.40* 1.67* 1.91*   ANIONGAP 9 11 8   BENNIE 7.2* 7.3* 8.1*   * 86 104*   ALBUMIN  --  2.6*  --    PROTTOTAL  --  6.1*  --    BILITOTAL  --  1.3  --    ALKPHOS  --  64  --    ALT  --  17  --    AST  --  30  --        Recent Results (from the past 24 hour(s))   CT Abdomen Pelvis w/o Contrast    Narrative    CT ABDOMEN AND PELVIS WITHOUT CONTRAST 7/24/2018 3:41 PM     HISTORY: Left ureteral stones.     COMPARISON: May 22, 2018    TECHNIQUE: Axial CT images of the abdomen and pelvis from the  diaphragm to the symphysis pubis were acquired without IV contrast.  Radiation dose for this scan was reduced using automated exposure  control, adjustment of the mA and/or kV according to patient size, or  iterative reconstruction technique.    FINDINGS: 3 mm stone adjacent to the stent in the region of the  crossing of the iliac vasculature in the left ureter. Stone versus  cluster of stones in the inferior pole of the left kidney measuring  1.8 cm. Bilateral ureteral stents in place. No right-sided stones. No  hydronephrosis. There is mild bilateral perinephric fat stranding.  Kidneys are normal in size and configuration. Small amount of free  fluid. No free air. There is moderate diverticulosis without evidence  for diverticulitis. There are no dilated loops of small intestine or  large bowel to suggest ileus or obstruction. There are extensive  atherosclerotic changes of the visualized aorta and its branches.  There is no evidence of aortic aneurysm. No splenomegaly. No definite  adrenal nodules. Pancreas grossly unremarkable. The remainder of the  visualized abdomen is unremarkable on this noncontrast scan. Survey of  the visualized bony structures demonstrates no destructive bony  lesions.  Small right and trace left pleural effusion with associated  atelectasis and/or infiltrate. There are extensive coronary vascular  calcifications and/or stents consistent with coronary artery disease.      Impression    IMPRESSION:   1. 3 mm stone adjacent to the stent approximately at the level of the  crossing of the iliac vasculature on the left.  2. Stone or cluster of stones in the inferior pole of the left kidney  measuring 1.8 cm.    CARMEN SERRATO MD   XR Surgery VIK L/T 5 Min Fluoro w Stills    Narrative    This exam was marked as non-reportable because it will not be read by a   radiologist or a [facility] non-radiologist provider.

## 2018-07-25 NOTE — CODE/RAPID RESPONSE
"[facility]  [facility] RRT Note  7/25/2018   Time Called: 0333  RRT called for: possible neuro changes   Code Status: Full Code    Assessment & Plan   I was paged to the bedside to evaluate Mr. Antonio Ramirez for an acute episode of delirium.  Bedside nurse woke patient up at which time patient was unable to remember his name, location, or situation.  Patient became anxious and slightly combative, then patient pulled out his IV.  On my initial exam patient was well appearing and in no acute distress.  When I asked orientation questions the patient stated \"I am told my name is Sage Ramirez\" and \"the date is on the board across the room.\"  Cranial nerves II through XII were grossly intact, I could not find any focal neuro deficits.  After approximately 5 minutes, patient stated \"I am starting to remember things.\"  Patient was able to accurately state his name and birthdate, along with location, and very intricate details as to why he is here.  I suggested that a few days in the hospital and waking up in the middle night can confuse anybody, especially somebody who is 73 years old.  I asked the patient if he had any signs or symptoms indicative of acute alcohol withdrawal, patient denied sweating, headache, hallucinations, or shaking.  Patient endorsed that he does drink alcohol but has never withdrawn while in the hospital.  Patient does have a history of recent traumatic subdural hematoma along with chronic anticoagulation for DVT/PE. Towards the end of this RRT patient was able to recall distant and recent memories, had clear and articulate speech, and he denied any constitutional symptoms.    Diagnosis:  -- delirium/confusion     Interventions ordered/provided:  -- neuro exam    At the conclusion of this RRT patient was neurologically intact and will remain on station 66. I will defer working this up further to the [facility] Hospitalist today. I have a low threshold to call a code stroke/ order " imaging if patient has recurrent symptoms.     Interval History    From Emely Ching's note:  Antonio Ramirez is a 73 year old male with complex past history recent traumatic subdural hematoma (4/7/18), alcoholic cirrhosis, chronic kidney disease III, hypertension, CAD s/p CABG, atrial fibrillation, CHF, and hospitalization in May 2018 with Klebsiella bacteremia with bilateral ureteral stones and associated hydronephrosis s/p bilateral nephrostomy tube placement (5/22/18) and bilateral ureteral stent placement (5/24/18) among other chronic medical issues who presented to the ED on 7/23/18 with complaints of fevers and chills with Tmax reported at 103 degrees fahrenheit. Tmax 100.5 degrees fahrenheit overnight, remainder of vitals WNL. Pt currently stable.    His history is significant for:  Past Medical History:   Diagnosis Date     Alcoholism (H)      Amputated left leg (H)      Anemia      Anticardiolipin antibody syndrome (H)      Antiplatelet or antithrombotic long-term use      Aortic stenosis      Arthritis      Balance problem      Cardiomyopathy (H)      Coagulation disorder (H)     cardiolipinantibody syndrome     Coronary artery disease      Gastro-oesophageal reflux disease      Heart attack 2007    apparently arrested, cardiac X5     Hiatal hernia      Hypercholesterolemia      Hypertension      Left foot drop      Liver disease     alcoholic liver disease, alcoholic cirrohsosis, portal hypertension     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)     ?When diagnosed, patient not aware of this     Moderate mitral regurgitation      Monoclonal gammopathy      Neuropathy      Noninfectious ileitis     diverticulitis of colon     PE (pulmonary embolism) 2006     Prostate cancer (H) 2000    Treated with radiation (seed implants in 2000) -- no problems since     Renal disease     kidney stones     Syncope      Thoracic aortic aneurysm, without rupture      Thrombocytopenia (H)      Thrombosis of leg       Urethral stricture      Past Surgical History:   Procedure Laterality Date     AMPUTATE LEG BELOW KNEE Left 04/2014    related to gangrene, started as foot ulcer related to neuropathy     AMPUTATE LEG BELOW KNEE Left 12/4/2017    Procedure: AMPUTATE LEG BELOW KNEE;  Exploration of Left Leg Below Knee Amputation Stump with Ligation of Bleeders.;  Surgeon: Leandro Arreola MD;  Location:  OR     APPENDECTOMY       APPLY WOUND VAC Left 12/6/2017    Procedure: APPLY WOUND VAC;;  Surgeon: Saima Howard MD;  Location:  [facility]     ARTHROPLASTY KNEE Right 9/19/2014    Procedure: ARTHROPLASTY KNEE;  Surgeon: Saima Howard MD;  Location:  OR     CARDIAC SURGERY      CABG     CHOLECYSTECTOMY       COLONOSCOPY       CRANIOTOMY Right 4/7/2018    Procedure: CRANIOTOMY;  Right Craniotomy For Subdural Hematoma;  Surgeon: Jono Issa MD;  Location:  OR     CYSTOSCOPY, DILATE URETHRA, COMBINED N/A 10/12/2016    Procedure: COMBINED CYSTOSCOPY, DILATE URETHRA;  Surgeon: Dimitry Bello MD;  Location:  OR     ENDOSCOPIC STRIPPING VEIN(S)       esophagogastroduodenoscopy       EYE SURGERY      cataracts     GENITOURINARY SURGERY      Prostate Seen Implants     HERNIA REPAIR      Umbilical     INCISION AND DRAINAGE LOWER EXTREMITY, COMBINED Left 12/1/2017    Procedure: COMBINED INCISION AND DRAINAGE LOWER EXTREMITY;  INCISION AND DRAINAGE OF LEFT BELOW KNEE AMPUTATION ABSCESS, WOUND VAC PLACEMENT;  Surgeon: Saima Howard MD;  Location:  OR     IR IVC FILTER PLACEMENT       IRRIGATION AND DEBRIDEMENT LOWER EXTREMITY, COMBINED Left 12/6/2017    Procedure: COMBINED IRRIGATION AND DEBRIDEMENT LOWER EXTREMITY;  IRRIGATION AND DEBRIDEMENT OF LEFT BELOW KNEE AMPUTATION SITE WITH WOUND VAC REPLACEMENT;  Surgeon: Saima Howard MD;  Location:  [facility]     IRRIGATION AND DEBRIDEMENT LOWER EXTREMITY, COMBINED Left 12/8/2017    Procedure: COMBINED IRRIGATION AND DEBRIDEMENT LOWER EXTREMITY;  IRRIGATION  "AND DEBRIDEMENT OF LEFT BELOW THE KNEE AMPUTATION AND SHORTENING OF THE TIBIA WITH WOUND CLOSURE;  Surgeon: Saima Howard MD;  Location:  OR     LASER HOLMIUM LITHOTRIPSY URETER(S), INSERT STENT, COMBINED Bilateral 6/28/2018    Procedure: COMBINED CYSTOSCOPY, URETEROSCOPY, LASER HOLMIUM LITHOTRIPSY URETER(S), INSERT STENT;  CYSTOSCOPY, BILATERAL URETEROSCOPY,  HOLMIUM LASER LITHOTRIPSY, BILATERAL STENT PLACEMENT, STONE BASKETING;  Surgeon: Jose Hunter MD;  Location:  OR     ORTHOPEDIC SURGERY      (R) knee scope     ORTHOPEDIC SURGERY      total hip      ORTHOPEDIC SURGERY      elbow surgery       Allergies   Allergies   Allergen Reactions     Ciprofloxacin Itching     Severe itching \"like ants were crawling\"     Hmg-Coa-R Inhibitors Other (See Comments)     Rhabdomyolysis occurred within a couple days       Physical Exam   Physical Exam   Constitutional: He is oriented to person, place, and time and well-developed, well-nourished, and in no distress. No distress.   HENT:   Head: Normocephalic and atraumatic.   Eyes: Pupils are equal, round, and reactive to light.   Neck: Normal range of motion.   Cardiovascular: Normal rate, regular rhythm, normal heart sounds and intact distal pulses.    Pulmonary/Chest: Effort normal and breath sounds normal. No respiratory distress.   Abdominal: Soft. He exhibits no distension.   Musculoskeletal: Normal range of motion.   Neurological: He is alert and oriented to person, place, and time. No cranial nerve deficit. He exhibits normal muscle tone. Coordination normal. GCS score is 15.   Skin: Skin is warm and dry. He is not diaphoretic.   Psychiatric: Mood and affect normal.       Vital Signs with Ranges:  Temp:  [97  F (36.1  C)-99.8  F (37.7  C)] 98  F (36.7  C)  Pulse:  [72] 72  Heart Rate:  [55-83] 55  Resp:  [11-17] 14  BP: (109-141)/(61-94) 139/91  SpO2:  [92 %-99 %] 95 %  I/O last 3 completed shifts:  In: 1610 [P.O.:360; I.V.:1250]  Out: 525 " [Urine:525]    Data     EKG:  -none performed    ABG:  -No lab results found in last 7 days.    Troponin:    Recent Labs   Lab Test  05/21/18   1719   TROPI  0.027       IMAGING: (X-ray/CT/MRI)   Recent Results (from the past 24 hour(s))   CT Abdomen Pelvis w/o Contrast    Narrative    CT ABDOMEN AND PELVIS WITHOUT CONTRAST 7/24/2018 3:41 PM     HISTORY: Left ureteral stones.     COMPARISON: May 22, 2018    TECHNIQUE: Axial CT images of the abdomen and pelvis from the  diaphragm to the symphysis pubis were acquired without IV contrast.  Radiation dose for this scan was reduced using automated exposure  control, adjustment of the mA and/or kV according to patient size, or  iterative reconstruction technique.    FINDINGS: 3 mm stone adjacent to the stent in the region of the  crossing of the iliac vasculature in the left ureter. Stone versus  cluster of stones in the inferior pole of the left kidney measuring  1.8 cm. Bilateral ureteral stents in place. No right-sided stones. No  hydronephrosis. There is mild bilateral perinephric fat stranding.  Kidneys are normal in size and configuration. Small amount of free  fluid. No free air. There is moderate diverticulosis without evidence  for diverticulitis. There are no dilated loops of small intestine or  large bowel to suggest ileus or obstruction. There are extensive  atherosclerotic changes of the visualized aorta and its branches.  There is no evidence of aortic aneurysm. No splenomegaly. No definite  adrenal nodules. Pancreas grossly unremarkable. The remainder of the  visualized abdomen is unremarkable on this noncontrast scan. Survey of  the visualized bony structures demonstrates no destructive bony  lesions. Small right and trace left pleural effusion with associated  atelectasis and/or infiltrate. There are extensive coronary vascular  calcifications and/or stents consistent with coronary artery disease.      Impression    IMPRESSION:   1. 3 mm stone adjacent to  the stent approximately at the level of the  crossing of the iliac vasculature on the left.  2. Stone or cluster of stones in the inferior pole of the left kidney  measuring 1.8 cm.    CARMEN SERRATO MD   XR Surgery VIK L/T 5 Min Fluoro w Stills    Narrative    This exam was marked as non-reportable because it will not be read by a   radiologist or a [facility] non-radiologist provider.                 CBC with Diff:  Recent Labs   Lab Test  07/24/18   0815   05/24/18   0700   WBC  5.5   < >  5.4   HGB  10.0*   < >  9.0*   MCV  88   < >  84   PLT  66*   < >  68*   INR   --    --   1.68*    < > = values in this interval not displayed.      No results found for: RETICABSCT  No results found for: RETP    Lactic Acid:    Lactic Acid   Date Value Ref Range Status   07/23/2018 1.8 0.7 - 2.0 mmol/L Final   07/23/2018 2.9 (H) 0.4 - 2.0 mmol/L Final   05/21/2018 1.0 0.7 - 2.0 mmol/L Final     No results found for: LACTS     Comprehensive Metabolic Panel:    Recent Labs  Lab 07/24/18  0815      POTASSIUM 4.1   CHLORIDE 106   CO2 18*   ANIONGAP 11   GLC 86   BUN 34*   CR 1.67*   GFRESTIMATED 40*   GFRESTBLACK 49*   BENNIE 7.3*   PROTTOTAL 6.1*   ALBUMIN 2.6*   BILITOTAL 1.3   ALKPHOS 64   AST 30   ALT 17       INR:    Recent Labs   Lab Test  05/24/18   0700   INR  1.68*       D-DIMER:  No components found for: DDIMER    BNP:  No results found for: BNP    UA:    Recent Labs  Lab 07/23/18  1512   COLOR Yellow   APPEARANCE Cloudy   URINEGLC Negative   URINEBILI Negative   URINEKETONE Negative   SG 1.015   UBLD Moderate*   URINEPH 6.0   PROTEIN 100*   NITRITE Negative   LEUKEST Large*   RBCU >182*   WBCU >182*       Time Spent on this Encounter   I spent 40 minutes (2114 - 6093) of critical care time on the unit/floor managing the care of Antonio Ramirez. Upon evaluation, this patient had a high probability of imminent or life-threatening deterioration due to TIA/CVA, which required my direct attention, intervention, and personal  management. 100% of my time was spent at the bedside counseling the patient and/or coordinating care regarding services listed in this note.    DIPAK Vega, CNP  Hospitalist - [facility]  Text Page  [phone])

## 2018-07-25 NOTE — PROGRESS NOTES
"[facility]  Infectious Disease Progress Note          Assessment and Plan:   IMPRESSION:   1.  A 73-year-old male with acute sepsis, found to have Gram-negative mellisa bacteremia and positive urine culture for likely same organism, probable stent-related urinary tract infection.   2.  Recent Klebsiella bacteremia, also urologic-related.   3.  History of stents, prior obstruction and stone disease, partially removed stones recently.   4.  History of deep venous thrombosis with anticardiolipin antibodies.   5.  History of chronic liver disease.   6.  Coronary artery disease.   7.  History of pulmonary hypertension and congestive heart failure.   8.  Recent subdural hematoma.   9.  CIPRO ALLERGY. By description \"anal itching\", so not clear allergy  10 Transient confusion episode this AM, ?      RECOMMENDATIONS:   1.  BC not back but almost certain same kleb in UC now and same as May episode(kleb also in UC 4/18), seems likely infected stone or similar active anatomic issue as explanation for relapsing infection.   2.  Await urologic intervention plans and timing, stents exchanged, oral antibiotics available for  home antibiotics, assuming he clinically improves.  HE IS ? CIPRO ALLERGIC, so limits oral choices somewhat. Had ceftin last episode(likely sens based on 4/18 org, but not any tested correlated ceftin agent in May episode, ), given bacteremia of urologic origin much preferred sulfa or quinolone as po , if delayed stone removal ? Sulfa preferred as may do extended tx until stone removal  3 To ceftriaxone for now            Interval History:   no new complaints and doing well; no cp, sob, n/v/d, or abd pain. Cognition now OK, note episode, T down BC not ID UC same kleb as May(and April)              Medications:       cefTRIAXone  2 g Intravenous Q24H     fluticasone  1 spray Both Nostrils BID     gabapentin  600 mg Oral TID     traZODone  50 mg Oral At Bedtime                  Physical Exam: " "  Blood pressure 120/73, pulse 72, temperature 97.6  F (36.4  C), temperature source Oral, resp. rate 14, height 1.88 m (6' 2\"), weight 99.2 kg (218 lb 11.1 oz), SpO2 96 %.  Wt Readings from Last 2 Encounters:   07/25/18 99.2 kg (218 lb 11.1 oz)   07/23/18 95.2 kg (209 lb 12.8 oz)     Vital Signs with Ranges  Temp:  [97  F (36.1  C)-98.4  F (36.9  C)] 97.6  F (36.4  C)  Heart Rate:  [53-83] 53  Resp:  [11-17] 14  BP: (109-141)/(61-94) 120/73  SpO2:  [95 %-99 %] 96 %    Constitutional: Awake, alert, cooperative, no apparent distress   Lungs: Clear to auscultation bilaterally, no crackles or wheezing   Cardiovascular: Regular rate and rhythm, normal S1 and S2, and no murmur noted   Abdomen: Normal bowel sounds, soft, non-distended, non-tender   Skin: No rashes, no cyanosis, no edema   Other:           Data:   All microbiology laboratory data reviewed.  Recent Labs   Lab Test  07/24/18   0815  07/23/18   1249  05/27/18   0608   WBC  5.5  7.1  5.2   HGB  10.0*  10.9*  8.9*   HCT  30.4*  34.0*  27.4*   MCV  88  90  83   PLT  66*  82*  106*     Recent Labs   Lab Test  07/24/18   0815  07/23/18   1249  05/27/18   0608   CR  1.67*  1.91*  1.23     No lab results found.  Recent Labs   Lab Test  07/24/18   1335  07/23/18   1512  07/23/18   1326  07/23/18   1249  06/07/18   1443  05/21/18   1745  05/21/18   1719  04/10/18   2230  04/10/18   2220   CULT  No growth after 12 hours  >100,000 colonies/mL  Klebsiella pneumoniae  *  No growth after 2 days  Cultured on the 1st day of incubation:  Gram negative rods  Susceptibility testing in progress  *  Critical Value/Significant Value, preliminary result only, called to and read back by  Anuja Ponce RN on 7.24.18 at 0942. bw    (Note)  NEGATIVE for the following organisms and resistance markers:  Acinetobacter sp., Citrobacter sp., Enterobacter sp., Proteus sp., E.  coli, K. pneumoniae/oxytoca, P. aeruginosa, CTX-M, KPC, NDM, VIM, IMP  and OXA by [employer] multiplex nucleic acid " test. Final identification  and antimicrobial susceptibility testing will be verified by standard  methods.    Critical Value/Significant Value called to and read back by DUSTY MEIER RN 1157 7.24.18 NDP    10,000 to 50,000 colonies/mL  mixed urogenital aranza    >100,000 colonies/mL  Klebsiella pneumoniae  *  >100,000 colonies/mL  Strain 2  Klebsiella pneumoniae  *  Cultured on the 1st day of incubation:  Klebsiella pneumoniae  *  Critical Value/Significant Value, preliminary result only, called to and read back by  Summer Ann RN, @0206 05/22/18.DH.    Susceptibility testing done on previous specimen  Cultured on the 1st day of incubation:  Klebsiella pneumoniae  *  Critical Value/Significant Value, preliminary result only, called to and read back by  Angus Lo RN SH88 @ 1021 5.22.18 JE    (Note)  NEGATIVE for the following organisms and resistance markers:  Acinetobacter sp., Citrobacter sp., Enterobacter sp., Proteus sp., E.  coli, K. pneumoniae/oxytoca, P. aeruginosa, CTX-M, KPC, NDM, VIM, IMP  and OXA by [employer] multiplex nucleic acid test. Final identification  and antimicrobial susceptibility testing will be verified by standard  methods.    Critical Value/Significant Value called to and read back by Angus Lo RN SH88 @ 2111 05.22.18 JE    No growth  No growth

## 2018-07-25 NOTE — PROGRESS NOTES
07/25/18 1427   Quick Adds   Type of Visit Initial PT Evaluation   Living Environment   Lives With spouse   Living Arrangements apartment   Home Accessibility no concerns   Number of Stairs to Enter Home 0  (Elevator access)   Number of Stairs Within Home 0   Self-Care   Usual Activity Tolerance moderate   Current Activity Tolerance fair   Equipment Currently Used at Home walker, rolling  (FWW only when sepsis first started. Now improving, no AD )   Functional Level Prior   Ambulation 0-->independent   Transferring 0-->independent   Fall history within last six months yes   Number of times patient has fallen within last six months 2   Which of the above functional risks had a recent onset or change? ambulation;transferring   General Information   Onset of Illness/Injury or Date of Surgery - Date 07/23/18   Referring Physician JONAS Nogueira   Patient/Family Goals Statement Return home   Pertinent History of Current Problem (include personal factors and/or comorbidities that impact the POC) Admitted 7/23/18 for urosepsis, ureteral stones and bilateral stents. PMH: CKD, hx of DVT, aortic stenosis, anemia, HTN, subdural hematoma, a-fib, CHF, L foot amputation.   Precautions/Limitations fall precautions   Weight-Bearing Status - LLE full weight-bearing   Weight-Bearing Status - RLE full weight-bearing   Cognitive Status Examination   Orientation orientation to person, place and time   Level of Consciousness alert   Follows Commands and Answers Questions 100% of the time;able to follow single-step instructions   Personal Safety and Judgment intact   Pain Assessment   Patient Currently in Pain No   Posture    Posture Forward head position;Protracted shoulders   Range of Motion (ROM)   ROM Quick Adds No deficits were identified   Strength   Strength Comments Hip flexion: 4/5 B, knee ext: 4+/5 B. R ankle DF 4+/5. L not tested due to prosthetic    Bed Mobility   Bed Mobility Comments Supine <>sit with SBA. Able to  "boost up in bed and lift both LE independently.   Transfer Skills   Transfer Comments Sit<>stand with CGA and FWW. Cues for safe walker management. Poor eccentric control when sitting made difficult by no arm rests on chair. Min assist to stand from chair with no arm rest   Gait   Gait Comments Pt ambulated 10 feet with CGA and FWW. Flex posture noted. Decreased dinorah, stride length and shuffle gait. Pt drags L foot a bit partially due to exessive ER on prosthetic foot.    Balance   Balance Comments A bit unsteady with turns using FWW. Cues for safe turning given   General Therapy Interventions   Planned Therapy Interventions bed mobility training;gait training;strengthening;transfer training   Clinical Impression   Criteria for Skilled Therapeutic Intervention yes, treatment indicated   PT Diagnosis Difficulty ambulating    Influenced by the following impairments Decreased strength and activity tolerance.   Functional limitations due to impairments Decreased ambulation distance, difficulty transfering    Clinical Presentation Stable/Uncomplicated   Clinical Presentation Rationale Medically stable   Clinical Decision Making (Complexity) Low complexity   Therapy Frequency` daily   Predicted Duration of Therapy Intervention (days/wks) 4 days   Anticipated Discharge Disposition Home with Assist   Risk & Benefits of therapy have been explained Yes   Patient, Family & other staff in agreement with plan of care Yes   [employer] TM \"6 Clicks\"   2016, Trustees of [employer], under license to [employer].  All rights reserved.   6 Clicks Short Forms Basic Mobility Inpatient Short Form   [employer] [facility]  \"6 Clicks\" V.2 Basic Mobility Inpatient Short Form   1. Turning from your back to your side while in a flat bed without using bedrails? 3 - A Little   2. Moving from lying on your back to sitting on the side of a flat bed without using bedrails? 3 - A Little   3. Moving to and from a bed to a " chair (including a wheelchair)? 3 - A Little   4. Standing up from a chair using your arms (e.g., wheelchair, or bedside chair)? 3 - A Little   5. To walk in hospital room? 3 - A Little   6. Climbing 3-5 steps with a railing? 2 - A Lot   Basic Mobility Raw Score (Score out of 24.Lower scores equate to lower levels of function) 17   Total Evaluation Time   Total Evaluation Time (Minutes) 10

## 2018-07-25 NOTE — PROVIDER NOTIFICATION
MD Notification    Notified Person: MD    Notified Person Name: Emely Ching ALEXA     Notification Date/Time: 7/25/2018 9:01 AM    Notification Interaction: In person    Purpose of Notification: Lab called, positive blood culture from 7/23 right arm, growing gram neg mellisa. No changed    Orders Received: none    Comments:

## 2018-07-25 NOTE — PLAN OF CARE
Problem: Patient Care Overview  Goal: Plan of Care/Patient Progress Review  Outcome: Adequate for Discharge Date Met: 07/25/18  Discharge    Patient discharged to home via family with OPPT  Care plan note: A/O x4. HR yelena mid 50's. Other VSS on RA. Refused capno/pulse ox. Up SBA GB. Left BKA with prosthesis. Denies pain/nausea/SOB. CMS intact. Good appetite. Voiding fair, dalia. BM x1 per patient report. Dressings CDI intact, to be changed tomorrow. Switched to PO bactrim. Patient discharged at 1530. All patient belongings accounted for and sent home with patient. IV removed. Discharge AVS reviewed, patient educated on infection, when to seek medical attention, and new medication bactrim. Patient verbalized understanding and had no further questions. Bactrim sent with patient. Follow up appt made with urology and PCP, patient aware. OPPT ordered and will contact patient, patient aware. Pharmacist to call pt and review meds.     Listed belongings gathered and returned to patient. Yes  Care Plan and Patient education resolved: Yes  Prescriptions if needed, hard copies sent with patient  NA  Home and hospital acquired medications returned to patient: Yes  Medication Bin checked and emptied on discharge Yes  Follow up appointment made for patient: Yes

## 2018-07-25 NOTE — PROGRESS NOTES
RRT called for change in orientation. A&O x4 at beginning of shift and then around 0330 pt unable to recall own name or . Has Hx of recent subdural hematoma and extensive cardiac Hx; a fib, no anticoagulation. . Nothing acute per NP, no new orders, cont to monitor.

## 2018-07-25 NOTE — PROVIDER NOTIFICATION
DATE:  7/25/2018   TIME OF RECEIPT FROM LAB:  1130  LAB TEST:  Blood culture  LAB VALUE:  Positive cocci in clusters growing from left arm drawn on 7/24/18  RESULTS GIVEN WITH READ-BACK TO (PROVIDER):  Chandu  TIME LAB VALUE REPORTED TO PROVIDER:   113

## 2018-07-25 NOTE — CONSULTS
Care Transition Initial Assessment - RN        Met with: Patient and his Spouse Helena SÁNCHEZ   Active Problems:    Sepsis (H)       Cognitive Status: awake, alert and oriented.        Contact information and PCP information verified: Yes  Lives With: spouse  Living Arrangements: apartment     Insurance concerns: No Insurance issues identified  ASSESSMENT  Patient currently receives the following services:  NA        Identified issues/concerns regarding health management:No issues are identified at present.  Pt really wanted to be discharged.  Pt had some delirium during night and does drink alcohol daily.  This has cleared.  Pt is a retired  and continues to be busy.  PT has recommended outpt PT.  Pt has a  that is a Physical Therapist that he sees twice a week at baseline, so he may decline additional PT services.       PLAN  Financial costs for the patient include:NA  Patient/family is agreeable to the plan?  Yes: home today on oral antibiotics       Patient anticipates needs for home equipment: No  Plan/Disposition: Home     Appointments:   PCP 7/30/18,   Urology Surgery 8/14/18      Care  (CTS) will continue to follow as needed.

## 2018-07-25 NOTE — PLAN OF CARE
Problem: Patient Care Overview  Goal: Plan of Care/Patient Progress Review  Outcome: No Change  Pt had undergone stent exchange w cystoscopy. A&O x4. SBA. A Fib baseline. HR irregular. Lungs clear. Tolerating diet. Incontinent at times. Urine red. DC disposition pending on recovery from surgery. Refused capno and bed alarms. Pt voiding in small recorded amounts. IVF and IV Abx infusing. No complaints of pain. CMS intact. Neuropathy baseline.

## 2018-07-25 NOTE — PLAN OF CARE
Problem: Patient Care Overview  Goal: Plan of Care/Patient Progress Review  PT:  Discharge Planner PT   Patient plan for discharge: Home with spouse  Current status: SBA for bed mobility and supine<>sit. Ambulated 300 feet with CGA and FWW. Min assist to stand from chair without arm rests. Participated in seated exercises.  Barriers to return to prior living situation: Falls risk  Recommendations for discharge: Home with assist  Rationale for recommendations: Pt is able to safely ambulate and complete transfers. Appropriate to discharge home with assist and supervision from spouse.       Entered by: John Verde 07/25/2018 2:58 PM

## 2018-07-25 NOTE — PROGRESS NOTES
[facility]    Urology Progress Note     Assessment & Plan   Antonio Ramirez is a 73 year old male who was admitted on 7/23/2018 for urosepsis and multiple left ureteral stones and bilateral stents.     Successful bilateral stent exchange yesterday with Dr. Cervantes    AV, afebrile for >24 Hrs    WBC is not elevated    Cr at baseline    UOP unmeasured    UCx shows Klebsiella, BCx gram neg rods    Will need a couple of weeks on Abx to recover before proceeding with stone removal    Plan:    ABx and dispo per IM/ID    OK for discharge by urology    Schedule for left ureteroscopy in 1-2 weeks for stone removal (AVS updated)    Will need stent removal about 2 weeks following that    Lai Donis PA-C 7/25/2018 11:05 AM  [facility]  Mon-Fri, 7am - 5pm  Text page [phone]  Office: [phone]      Interval History   Stents exchanged yesterday, no discomfort. Feeling much better and would like to go home    Physical Exam   Temp: 97.6  F (36.4  C) Temp src: Oral BP: 120/73   Heart Rate: 53 Resp: 14 SpO2: 96 % O2 Device: None (Room air) Oxygen Delivery: 2 LPM  Vitals:    07/23/18 1234 07/25/18 0614   Weight: 94.8 kg (209 lb) 99.2 kg (218 lb 11.1 oz)     Vital Signs with Ranges  Temp:  [97  F (36.1  C)-98.4  F (36.9  C)] 97.6  F (36.4  C)  Heart Rate:  [53-83] 53  Resp:  [11-17] 14  BP: (109-141)/(61-94) 120/73  SpO2:  [95 %-99 %] 96 %  I/O last 3 completed shifts:  In: 1610 [P.O.:360; I.V.:1250]  Out: 350 [Urine:350]    General: Patient reclined in bed resting comfortably  Head: Normocephalic, atraumatic  Eyes: No icterus  Neck: Symmetric  Respiratory: Breathing unlabored  Cardiac: Skin well-perfused  Skin: No visible rashes   Extremities: No LE edema, no calf pain  Neurologic: No focal deficits  Neuropsychiatric: A&O x 3, responding appropriately      Medications       cefTRIAXone  2 g Intravenous Q24H     fluticasone  1 spray Both Nostrils BID     gabapentin  600 mg Oral TID      traZODone  50 mg Oral At Bedtime       Data   Results for orders placed or performed during the hospital encounter of 07/23/18 (from the past 24 hour(s))   Blood culture   Result Value Ref Range    Specimen Description Blood Left Arm     Special Requests Aerobic and anaerobic bottles received     Culture Micro No growth after 12 hours    ABO/Rh type and screen   Result Value Ref Range    ABO O     RH(D) Pos     Antibody Screen Neg     Test Valid Only At [facility]        Specimen Expires 07/27/2018    CT Abdomen Pelvis w/o Contrast    Narrative    CT ABDOMEN AND PELVIS WITHOUT CONTRAST 7/24/2018 3:41 PM     HISTORY: Left ureteral stones.     COMPARISON: May 22, 2018    TECHNIQUE: Axial CT images of the abdomen and pelvis from the  diaphragm to the symphysis pubis were acquired without IV contrast.  Radiation dose for this scan was reduced using automated exposure  control, adjustment of the mA and/or kV according to patient size, or  iterative reconstruction technique.    FINDINGS: 3 mm stone adjacent to the stent in the region of the  crossing of the iliac vasculature in the left ureter. Stone versus  cluster of stones in the inferior pole of the left kidney measuring  1.8 cm. Bilateral ureteral stents in place. No right-sided stones. No  hydronephrosis. There is mild bilateral perinephric fat stranding.  Kidneys are normal in size and configuration. Small amount of free  fluid. No free air. There is moderate diverticulosis without evidence  for diverticulitis. There are no dilated loops of small intestine or  large bowel to suggest ileus or obstruction. There are extensive  atherosclerotic changes of the visualized aorta and its branches.  There is no evidence of aortic aneurysm. No splenomegaly. No definite  adrenal nodules. Pancreas grossly unremarkable. The remainder of the  visualized abdomen is unremarkable on this noncontrast scan. Survey of  the visualized bony structures demonstrates no  destructive bony  lesions. Small right and trace left pleural effusion with associated  atelectasis and/or infiltrate. There are extensive coronary vascular  calcifications and/or stents consistent with coronary artery disease.      Impression    IMPRESSION:   1. 3 mm stone adjacent to the stent approximately at the level of the  crossing of the iliac vasculature on the left.  2. Stone or cluster of stones in the inferior pole of the left kidney  measuring 1.8 cm.    CARMEN SERRATO MD   EKG 12-lead, tracing only   Result Value Ref Range    Interpretation ECG Click View Image link to view waveform and result    XR Surgery VKI L/T 5 Min Fluoro w Stills    Narrative    This exam was marked as non-reportable because it will not be read by a   radiologist or a [place] non-radiologist provider.             Glucose by meter   Result Value Ref Range    Glucose 153 (H) 70 - 99 mg/dL   Glucose by meter   Result Value Ref Range    Glucose 169 (H) 70 - 99 mg/dL   CBC with platelets differential   Result Value Ref Range    WBC 3.9 (L) 4.0 - 11.0 10e9/L    RBC Count 3.54 (L) 4.4 - 5.9 10e12/L    Hemoglobin 10.3 (L) 13.3 - 17.7 g/dL    Hematocrit 31.6 (L) 40.0 - 53.0 %    MCV 89 78 - 100 fl    MCH 29.1 26.5 - 33.0 pg    MCHC 32.6 31.5 - 36.5 g/dL    RDW 22.4 (H) 10.0 - 15.0 %    Platelet Count 76 (L) 150 - 450 10e9/L    Diff Method Automated Method     % Neutrophils 83.7 %    % Lymphocytes 8.1 %    % Monocytes 7.9 %    % Eosinophils 0.0 %    % Basophils 0.0 %    % Immature Granulocytes 0.3 %    Nucleated RBCs 0 0 /100    Absolute Neutrophil 3.3 1.6 - 8.3 10e9/L    Absolute Lymphocytes 0.3 (L) 0.8 - 5.3 10e9/L    Absolute Monocytes 0.3 0.0 - 1.3 10e9/L    Absolute Eosinophils 0.0 0.0 - 0.7 10e9/L    Absolute Basophils 0.0 0.0 - 0.2 10e9/L    Abs Immature Granulocytes 0.0 0 - 0.4 10e9/L    Absolute Nucleated RBC 0.0    Basic metabolic panel   Result Value Ref Range    Sodium 137 133 - 144 mmol/L    Potassium 4.2 3.4 - 5.3 mmol/L     Chloride 107 94 - 109 mmol/L    Carbon Dioxide 21 20 - 32 mmol/L    Anion Gap 9 3 - 14 mmol/L    Glucose 149 (H) 70 - 99 mg/dL    Urea Nitrogen 31 (H) 7 - 30 mg/dL    Creatinine 1.40 (H) 0.66 - 1.25 mg/dL    GFR Estimate 49 (L) >60 mL/min/1.7m2    GFR Estimate If Black 60 (L) >60 mL/min/1.7m2    Calcium 7.2 (L) 8.5 - 10.1 mg/dL

## 2018-07-25 NOTE — CONSULTS
Consult Date:  07/24/2018      INFECTIOUS DISEASE CONSULTATION      REFERRING PHYSICIAN:  JONAS Hsu       IMPRESSION:   1.  A 73-year-old male with acute sepsis, found to have Gram-negative mellisa bacteremia and positive urine culture for likely same organism, probable stent-related urinary tract infection.   2.  Recent Klebsiella bacteremia, also urologic-related.   3.  History of stents, prior obstruction and stone disease, partially removed stones recently.   4.  History of deep venous thrombosis with anticardiolipin antibodies.   5.  History of chronic liver disease.   6.  Coronary artery disease.   7.  History of pulmonary hypertension and congestive heart failure.   8.  Recent subdural hematoma.   9.  CIPRO ALLERGY.      RECOMMENDATIONS:   1.  Continue Zosyn he is on awaiting cultures and simplify and adjust as able.   2.  Await urologic intervention and findings and hopefully, oral antibiotics available for post-procedure home antibiotics, assuming he clinically improves.  HE IS CIPRO ALLERGIC, so limits oral choices somewhat.      HISTORY OF PRESENT ILLNESS:  This 73-year-old male is seen in consultation due to urosepsis.  The patient has a history of ongoing urologic problems with known stones, prior obstruction, a prior Klebsiella bacteremia in 05/2018 and recent urologic intervention for partial stone removal.  Currently, he had trouble urinating at admission, but did not really have burning or discomfort.  He did have a sense of urgency.  No significant flank discomfort.  Imaging does not show overt obstruction at present.  There are ongoing stones present and he has the stents present as well.  Most of the stones are in the left kidney area.  The patient initially presented with chills, fever, malaise, and is found to have Gram-negative mellisa bacteremia.  No other focal or localizing pain or discomfort.      PAST MEDICAL HISTORY:  Prior deep venous thrombosis, history of chronic liver  disease, history of the recent Klebsiella bacteremia, history of known kidney stones, history of recent subdural hematoma.      ALLERGIES:  CIPRO WHICH HAS BEEN A RASH IN THE PAST.        SOCIAL AND FAMILY HISTORY:  No recent travels or exposures.  No one else he has been around has been ill.  No known resistant pathogens.      MEDICATIONS:  As listed.      REVIEW OF SYSTEMS:  Largely as listed above.  No other focal or localizing symptoms.  Not really having back or flank discomfort.  His cognition is improved from admission and temp is down.      PHYSICAL EXAMINATION:   GENERAL:  The patient appears his stated age.  He does not look that toxic or ill at present.  His cognition seems fairly intact.  Wife is here and does think it is still slightly off.   HEENT:  No thrush or intraoral lesions.  Pupils are reactive.   NECK:  Supple and nontender, no lymphadenopathy or thyromegaly.   HEART:  Regular rhythm, no major murmur.   LUNGS:  Clear bilaterally.   ABDOMEN:  Soft, nontender, no flank tenderness.  No suprapubic tenderness.   EXTREMITIES:  Some abrasions on his legs, but no obvious infection site.      LABORATORY DATA:  Blood culture is growing a Gram-negative mellisa, not yet identified.  Urine probably with same organism, not yet identified.  White count at admission was 7100 with a left shift.  Imaging with ongoing stone formation, but not overt obstruction.      Thank you much for the consultation.  I will follow the patient with you.           JERMAIN VALDOVINOS MD             D: 2018   T: 2018   MT: ADDISON      Name:     GALILEA LUGO   MRN:      [id number]        Account:       [id number]   :      1945           Consult Date:  2018      Document: [id number]       cc: IFEOMA Hunter MD

## 2018-07-25 NOTE — DISCHARGE SUMMARY
[facility]    Discharge Summary  Hospitalist    Date of Admission:  7/23/2018  Date of Discharge:  7/25/2018  Discharging Provider: Emely Ching PA-C  Date of Service (when I saw the patient): 07/25/18    Discharge Diagnoses   Severe sepsis in the setting of bacteremia, Klebsiella pneumoniae   Urinary tract infection   S/P cystoscopy with bilateral retrograde pyelograms and bilateral stent exchange (7/24/18)  Lactic acidosis, resolved  History of bilateral nephrostomy tubes on 5/22, bilateral stent placement on 5/24  ANSELMO on CKD III     History of Present Illness   Antonio Ramirez is a 73 year old male with complex past history recent traumatic subdural hematoma (4/7/18), alcoholic cirrhosis, chronic kidney disease III, hypertension, CAD s/p CABG, atrial fibrillation, CHF, and hospitalization in May 2018 with Klebsiella bacteremia with bilateral ureteral stones and associated hydronephrosis s/p bilateral nephrostomy tube placement (5/22/18) and bilateral ureteral stent placement (5/24/18) among other chronic medical issues who presented to the ED on 7/23/18 with complaints of fevers and chills with Tmax reported at 103 degrees fahrenheit. Tmax 100.5 degrees fahrenheit overnight, remainder of vitals WNL. Pt currently stable.     Refer to my progress note from the day.     Hospital Course   Antonio Ramirez was admitted on 7/23/2018.  The following problems were addressed during his hospitalization:    Severe sepsis in the setting of bacteremia, Klebsiella pneumoniae with most consistent source being urinary tract infection with associated infected ureteral stones and bilateral stents  S/P cystoscopy with bilateral retrograde pyelograms and bilateral stent exchange (7/24/18)  Lactic acidosis, resolved  History of bilateral nephrostomy tubes on 5/22, bilateral stent placement on 5/24: Hospitalization in May 2018 with Klebsiella bacteremia with bilateral ureteral stones and associated  hydronephrosis s/p bilateral nephrostomy tube placement (5/22/18) and bilateral ureteral stent placement (5/24/18), treated with IV Rocephin throughout hospitalization, changed to Ceftin at discharge with plans to complete 2 weeks total. Creatinine 1.91 on admission. WBC WNL. UA with large LE and blood, >182 WBCs and RBCs. Urine culture with >100,000 colonies of Klebsiella pneumoniae sensitive to cipro and bactrim. Blood culture with Klebsiella 7/23, repeat BC on 7/24 Gram positive cocci in clusters likely contaminant based on verigene. Lactic acid 2.9>1.8 after IVF resuscitation. CT A/P with 3 mm stone adjacent to the stent approximately at the level of the crossing of the iliac vasculature on the left. Stone or cluster of stones in the interior pole of the left kidney measuring 1.8 cm.   -- Urology consulted, pt underwent procedure (as above) on 7/24; tentative plan per op note indicates f/u with Dr. Hunter in the next several weeks for definitive management of his left ureteral stones and removal of his right ureteral stent. Procedure scheduled for 8/14/18 (in [facility]), follow-up with Urology in 2 weeks.   -- ID following, Zosyn switched to Ceftriaxone on 7/25. Discussed BC results with Dr. Schofield, plan to discharge with rx for Bactrim DS BID X3 weeks, f/u with PCP in the next week (7/30/18) for hospital f/u and to recheck creatinine   -- PT assessed, recommending OPPT at discharge, orders placed      Transient encephalopathy, resolved: RRT called overnight. Pt was awoken by bedside RN and was unable to remember his name, location, or situation. Pt became anxious and slightly combative. After approximately 5 minutes, symptoms resolved. No focal neuro deficits noted. This AM, on my interview, pt is oriented X4. No appreciable neuro deficits. Hx of alcohol abuse, but not exhibiting signs of withdrawal.   -- No recurrence throughout the day       Acute on chronic kidney disease III, improving: Most recent creatinine  1.2, prior to that 1.4-1.8. Creatinine on admission 1.91>1.67>1.40.   - Discontinued IVF on 7/25  - Resume torsemide at discharge   - Creatinine recheck at PCP f/u on 7/30/18     Alcoholic cirrhosis  Alcohol abuse: He reports 2 drinks a day of whiskey. During last hospitalization, monitored per [facility] protocol, but did not demonstrate evidence of withdrawal.   - Monitor for signs of withdrawal      Acute on chronic anemia and thrombocytopenia  Suspect due to alcoholic cirrhosis. Baseline hemoglobin approximately 10-11, baseline plt 130-150. Platelets 82>66>76, likely secondary to sepsis above.     Recent Labs  Lab 07/25/18  0841 07/24/18  0815 07/23/18  1249   HGB 10.3* 10.0* 10.9*     CAD status post CABG ×3 in 2007  Atrial fibrillation, chronic ([id number]-HDMh Score of 6)  Severe pulmonary HTN  Chronic compensated systolic congestive heart failure: Most recent echocardiogram from 7/11/18 with EF 40-45%, anterior, septal, and apical wall akinesis, moderate MR, severe pulmonary HTN (stable)  - Resume PTA PTA Coreg 3.125 mg po BID and Torsemide 20 mg po every day at discharge   - Restart Xarelto and ASA at discharge with instruction to hold 7 days prior to procedure     Intake/Output Summary (Last 24 hours) at 07/25/18 1453  Last data filed at 07/25/18 1251   Gross per 24 hour   Intake             1850 ml   Output              250 ml   Net             1600 ml     Multiple wounds abrasion on the right knee, right anterior forearm  - Wound nurse consultation throughout stay       H/o recent traumatic subdural hematoma (4/2018): Since that hospitalization, pt has been restarted on PTA Xarelto   History of DVT PE s/p IVC filter and Anticardiolipin Antibody: On xarelto normally, holding for 1 week prior to urological instrumentation  - Resume Xarelto at discharge as above, hold 7 days prior to urologic procedure     # Discharge Pain Plan:   - Patient currently has NO PAIN and is not being prescribed pain medications on  discharge.    Emely Ching PA-C    This patient was discussed with Dr. Nicholson of the Hospitalist Service who independently interviewed and examined the patient and agrees with current plans as outlined above.     Significant Results and Procedures   See below    Pending Results   These results will be followed up by PCP   Unresulted Labs Ordered in the Past 30 Days of this Admission     Date and Time Order Name Status Description    7/24/2018 1201 Blood culture Preliminary     7/23/2018 1320 Blood culture Preliminary     7/23/2018 1320 Blood culture Preliminary           Code Status   Full Code       Primary Care Physician   Main Hardy    Discharge Disposition   Discharged to home  Condition at discharge: Stable    Consultations This Hospital Stay   UROLOGY IP CONSULT  WOUND OSTOMY CONTINENCE NURSE  IP CONSULT  UROLOGY IP CONSULT  INFECTIOUS DISEASES IP CONSULT  PHYSICAL THERAPY ADULT IP CONSULT  SOCIAL WORK IP CONSULT  CARE COORDINATOR IP CONSULT    Time Spent on this Encounter   I, Emely Ching, personally saw the patient today and spent greater than 30 minutes discharging this patient.    Discharge Orders     Follow-up and recommended labs and tests    Follow up with Dr. Hunter in 2 weeks for left ureteroscopy. Stop Xarelto 1 week prior to this operation.    Urology [facility] ([facility])  [address] [place] [address], [address]  [place]. [address]  You may call [phone] with any questions or concerns.   Central Appointment #: [phone]     Follow-up and recommended labs and tests    Follow up with Dr. Hunter. You are scheduled for surgery on 8/14 at 1pm at [facility].     PLEASE STOP TAKING XARELTO 1 WEEK PRIOR TO SURGERY    Urology [facility] ([facility])  [address] [place] [address], [address]  [place]. [address]  You may call [phone] with any questions or  concerns.   Central Appointment #: [phone]     Reason for your hospital stay   You were hospitalized for further evaluation and treatment of an infection within your urinary tract with subsequent bactere     Follow-up and recommended labs and tests    Follow up with primary care provider, Main Hardy, within 7 days for hospital follow- up.  The following labs/tests are recommended: Creatinine.     Activity   Your activity upon discharge: activity as tolerated     Discharge Instructions   1) Follow-up with your primary care provider in the next week to discuss hospitalization and recheck a creatinine while on antibiotics   2) Discharged with Bactrim DS to take twice daily for 3 weeks   3) Urology intervention scheduled for August 14th  4) Hold aspirin and Xarelto for 7 days prior to procedure     Full Code     Diet   Follow this diet upon discharge: Regular diet       Discharge Medications   Current Discharge Medication List      START taking these medications    Details   sulfamethoxazole-trimethoprim (BACTRIM DS/SEPTRA DS) 800-160 MG per tablet Take 1 tablet by mouth 2 times daily for 21 days  Qty: 42 tablet, Refills: 0    Associated Diagnoses: Severe sepsis (H); Infection associated with indwelling ureteral stent, initial encounter (H)         CONTINUE these medications which have NOT CHANGED    Details   aspirin 81 MG tablet Take 81 mg by mouth daily      carvedilol (COREG) 3.125 MG tablet Take 1 tablet (3.125 mg) by mouth 2 times daily (with meals)  Qty: 180 tablet, Refills: 1      fluticasone (FLONASE) 50 MCG/ACT spray Spray 1 spray into both nostrils 2 times daily       gabapentin (NEURONTIN) 600 MG tablet Take 1 tablet (600 mg) by mouth 3 times daily  Qty: 270 tablet, Refills: 3    Associated Diagnoses: Phantom limb pain (H)      phenazopyridine (PYRIDIUM) 100 MG tablet Take 1 tablet (100 mg) by mouth 3 times daily as needed for urinary tract discomfort  Qty: 90 tablet, Refills: 3    Associated  "Diagnoses: Urinary tract infection without hematuria, site unspecified      rivaroxaban ANTICOAGULANT (XARELTO) 10 MG TABS tablet Take 1 tablet (10 mg) by mouth daily (with dinner)      torsemide (DEMADEX) 20 MG tablet Take 20 mg by mouth daily         STOP taking these medications       HYDROcodone-acetaminophen (NORCO) 5-325 MG per tablet Comments:   Reason for Stopping:         HYDROMORPHONE HCL PO Comments:   Reason for Stopping:         oxyCODONE IR (ROXICODONE) 5 MG tablet Comments:   Reason for Stopping:             Allergies   Allergies   Allergen Reactions     Ciprofloxacin Itching     Severe itching \"like ants were crawling\"     Hmg-Coa-R Inhibitors Other (See Comments)     Rhabdomyolysis occurred within a couple days     Data   Most Recent 3 CBC's:  Recent Labs   Lab Test  07/25/18   0841  07/24/18   0815  07/23/18   1249   WBC  3.9*  5.5  7.1   HGB  10.3*  10.0*  10.9*   MCV  89  88  90   PLT  76*  66*  82*      Most Recent 3 BMP's:  Recent Labs   Lab Test  07/25/18   0841  07/24/18   0815  07/23/18   1249   NA  137  135  135   POTASSIUM  4.2  4.1  4.3   CHLORIDE  107  106  103   CO2  21  18*  24   BUN  31*  34*  31*   CR  1.40*  1.67*  1.91*   ANIONGAP  9  11  8   BENNIE  7.2*  7.3*  8.1*   GLC  149*  86  104*     Most Recent 2 LFT's:  Recent Labs   Lab Test  07/24/18   0815  05/22/18   0737   AST  30  41   ALT  17  16   ALKPHOS  64  89   BILITOTAL  1.3  1.5*     Most Recent INR's and Anticoagulation Dosing History:  Anticoagulation Dose History     Recent Dosing and Labs Latest Ref Rng & Units 1/28/2018 1/29/2018 4/7/2018 4/8/2018 4/10/2018 4/11/2018 5/24/2018    INR 0.86 - 1.14 3.61(H) - 1.96(H) 1.37(H) 1.58(H) 1.39(H) 1.68(H)    Chromogenic Factor 10 70 - 130 % - 20(L) - - - - -        Most Recent 3 Troponin's:  Recent Labs   Lab Test  05/21/18   1719  04/07/18   1240  01/29/18   0640   TROPI  0.027  <0.015  <0.015     Most Recent Cholesterol Panel:No lab results found.  Most Recent 6 Bacteria Isolates " From Any Culture (See EPIC Reports for Culture Details):  Recent Labs   Lab Test  07/24/18   1335  07/23/18   1512  07/23/18   1326  07/23/18   1249  06/07/18   1443  05/21/18   1745   CULT  Cultured on the 1st day of incubation:  Gram positive cocci in clusters  *  Critical Value/Significant Value, preliminary result only, called to and read back by  Ifrah Enamorado RN on 66 at 1131 7/25/18 SRQ    (Note)  POSITIVE for Staphylococci other than S.aureus, S.epidermidis and  S.lugdunensis, by Sentiment multiplex nucleic acid test.  Coagulase-negative staphylococci are the most common venipuncture or  collection associated skin CONTAMINANTS grown in blood cultures.  Final identification and antimicrobial susceptibility testing will be  verified by standard methods.    Specimen tested with Verigene multiplex, gram-positive blood culture  nucleic acid test for the following targets: Staph aureus, Staph  epidermidis, Staph lugdunensis, other Staph species, Enterococcus  faecalis, Enterococcus faecium, Streptococcus species, S. agalactiae,  S. anginosus grp., S. pneumoniae, S. pyogenes, Listeria sp., mecA  (methicillin resistance) and Rad/B (vancomycin resistance).    Critical Value/Significant Value called to and read back by Janet Donis RN, @ 1405 7.25.2018 BL    >100,000 colonies/mL  Klebsiella pneumoniae  *  Cultured on the 2nd day of incubation:  Gram negative rods  *  Critical Value/Significant Value, preliminary result only, called to and read back by  Janet Donis RN on 66 at 0900 7/25/18 SRQ    Cultured on the 1st day of incubation:  Klebsiella pneumoniae  *  Critical Value/Significant Value, preliminary result only, called to and read back by  Anuja Ponce RN on 7.24.18 at 0942. bw    (Note)  NEGATIVE for the following organisms and resistance markers:  Acinetobacter sp., Citrobacter sp., Enterobacter sp., Proteus sp., E.  coli, K. pneumoniae/oxytoca, P. aeruginosa, CTX-M, KPC, NDM, VIM, IMP  and  OXA by [place] multiplex nucleic acid test. Final identification  and antimicrobial susceptibility testing will be verified by standard  methods.    Critical Value/Significant Value called to and read back by DUSTY MEIER RN 1157 7.24.18 NDP    10,000 to 50,000 colonies/mL  mixed urogenital aranza    >100,000 colonies/mL  Klebsiella pneumoniae  *  >100,000 colonies/mL  Strain 2  Klebsiella pneumoniae  *  Cultured on the 1st day of incubation:  Klebsiella pneumoniae  *  Critical Value/Significant Value, preliminary result only, called to and read back by  Summer Ann RN, @5552 05/22/18..    Susceptibility testing done on previous specimen     Most Recent TSH, T4 and A1c Labs:  Recent Labs   Lab Test  05/18/18   1641   A1C  4.6     Results for orders placed or performed during the hospital encounter of 07/23/18   XR Chest 2 Views    Narrative    XR CHEST 2 VW 7/23/2018 2:14 PM    HISTORY: fever, cough, pneumonia;       Impression    IMPRESSION: Postoperative change. Shallow inspiration with some mild  atelectasis in the lung bases. Chest otherwise negative.    MIKO ALSTON MD   CT Abdomen Pelvis w/o Contrast    Narrative    CT ABDOMEN AND PELVIS WITHOUT CONTRAST 7/24/2018 3:41 PM     HISTORY: Left ureteral stones.     COMPARISON: May 22, 2018    TECHNIQUE: Axial CT images of the abdomen and pelvis from the  diaphragm to the symphysis pubis were acquired without IV contrast.  Radiation dose for this scan was reduced using automated exposure  control, adjustment of the mA and/or kV according to patient size, or  iterative reconstruction technique.    FINDINGS: 3 mm stone adjacent to the stent in the region of the  crossing of the iliac vasculature in the left ureter. Stone versus  cluster of stones in the inferior pole of the left kidney measuring  1.8 cm. Bilateral ureteral stents in place. No right-sided stones. No  hydronephrosis. There is mild bilateral perinephric fat stranding.  Kidneys are normal  in size and configuration. Small amount of free  fluid. No free air. There is moderate diverticulosis without evidence  for diverticulitis. There are no dilated loops of small intestine or  large bowel to suggest ileus or obstruction. There are extensive  atherosclerotic changes of the visualized aorta and its branches.  There is no evidence of aortic aneurysm. No splenomegaly. No definite  adrenal nodules. Pancreas grossly unremarkable. The remainder of the  visualized abdomen is unremarkable on this noncontrast scan. Survey of  the visualized bony structures demonstrates no destructive bony  lesions. Small right and trace left pleural effusion with associated  atelectasis and/or infiltrate. There are extensive coronary vascular  calcifications and/or stents consistent with coronary artery disease.      Impression    IMPRESSION:   1. 3 mm stone adjacent to the stent approximately at the level of the  crossing of the iliac vasculature on the left.  2. Stone or cluster of stones in the inferior pole of the left kidney  measuring 1.8 cm.    CARMEN SERRATO MD   XR Surgery VIK L/T 5 Min Fluoro w Stills    Narrative    This exam was marked as non-reportable because it will not be read by a   radiologist or a [place] non-radiologist provider.

## 2018-07-25 NOTE — ANESTHESIA POSTPROCEDURE EVALUATION
Patient: Antonio Ramirez    Procedure(s):  CYSTOSCOPY, BILATERAL RETROGRADES, BILATERAL URETERAL STENT EXCHANGE  - Wound Class: II-Clean Contaminated   - Wound Class: II-Clean Contaminated    Diagnosis:BILATERAL  KIDNEY STONES   Diagnosis Additional Information: No value filed.    Anesthesia Type:  General, LMA    Note:  Anesthesia Post Evaluation    Patient location during evaluation: PACU  Patient participation: Able to fully participate in evaluation  Level of consciousness: awake and alert  Pain management: adequate  Airway patency: patent  Cardiovascular status: acceptable  Respiratory status: acceptable  Hydration status: acceptable  PONV: none     Anesthetic complications: None          Last vitals:  Vitals:    07/24/18 1800 07/24/18 1806 07/24/18 1840   BP: 113/61  126/72   Pulse:      Resp: 11  16   Temp:   36.7  C (98  F)   SpO2: 99% 96% 95%         Electronically Signed By: Juarez Thakkar MD  July 25, 2018  12:20 AM

## 2018-07-26 ENCOUNTER — TELEPHONE (OUTPATIENT)
Dept: FAMILY MEDICINE | Facility: CLINIC | Age: 73
End: 2018-07-26

## 2018-07-26 ENCOUNTER — PATIENT OUTREACH (OUTPATIENT)
Dept: FAMILY MEDICINE | Facility: CLINIC | Age: 73
End: 2018-07-26

## 2018-07-26 DIAGNOSIS — G62.9 PERIPHERAL POLYNEUROPATHY: ICD-10-CM

## 2018-07-26 LAB — INTERPRETATION ECG - MUSE: NORMAL

## 2018-07-26 NOTE — PLAN OF CARE
Problem: Patient Care Overview  Goal: Plan of Care/Patient Progress Review  PT: Patient discharged home. PT goals not met.    Physical Therapy Discharge Summary    Reason for therapy discharge:    Discharged to home.    Progress towards therapy goal(s). See goals on Care Plan in [facility] electronic health record for goal details.  Goals not met.  Barriers to achieving goals:   discharge on same date as initial evaluation.    Therapy recommendation(s):    No further therapy is recommended.

## 2018-07-26 NOTE — TELEPHONE ENCOUNTER
Acute Pyelonephritis, Severe Sepsis (H) 07/25/2018 6 mo ED/IP 0/4  [phone] (home)     Hosp f/u 7/30/18 with PCP

## 2018-07-26 NOTE — TELEPHONE ENCOUNTER
Requested Prescriptions   Pending Prescriptions Disp Refills     gabapentin (NEURONTIN) 600 MG tablet [Pharmacy Med Name: GABAPENTIN 600MG TABLETS] 90 tablet 0     Sig: TAKE ONE TABLET BY MOUTH TWICE DAILY    There is no refill protocol information for this order        gabapentin (NEURONTIN) 600 MG tablet      Last Written Prescription Date:  6/27/18  Last Fill Quantity: 270 tablet,   # refills: 3  Last Office Visit: 6/27/18 (Antony)  Future Office visit:    Next 5 appointments (look out 90 days)     Jul 30, 2018  2:30 PM CDT   [facility] with Main Hardy [facility]   [facility]    [address] [address]   [phone]                   Routing refill request to provider for review/approval because:  Drug not on the [facility], [facility] or [facility] refill protocol or controlled substance

## 2018-07-26 NOTE — PROGRESS NOTES
Clinic Care Coordination Contact  Hospital Follow up    Data: Per CTS, patient was discharged to home from [facility] yesterday, 7/25/18. Clinic triage nurse Lynne Epperson contacted patient today. Patient is scheduled for an office visit with Dr. Hardy for 7/30/18 at 2:30 pm.    Plan: [facility] will attempt to meet with patient after patient's office visit with Dr. Hardy on 7/30. Will review/discuss community resources as indicated.    TREMAYNE Torrez  [facility] Care Coordination  Clinic: [place]   Email: [email or web address]  Tele: [phone]

## 2018-07-27 ENCOUNTER — TELEPHONE (OUTPATIENT)
Dept: PHARMACY | Facility: OTHER | Age: 73
End: 2018-07-27

## 2018-07-27 LAB
BACTERIA SPEC CULT: ABNORMAL
Lab: ABNORMAL
SPECIMEN SOURCE: ABNORMAL

## 2018-07-27 RX ORDER — GABAPENTIN 600 MG/1
TABLET ORAL
Refills: 0
Start: 2018-07-27

## 2018-07-27 NOTE — TELEPHONE ENCOUNTER
MTM referral from: Transitions of Care (recent hospital discharge or ED visit)    MTM referral outreach attempt #2 on July 27, 2018 at 11:11 AM      Outcome: Patient is not interested at this time because he feels well versed in his medications, will route to MTM Pharmacist/Provider as an FYI. Thank you for the referral.     Adrienne Puga, MTM Coordinator

## 2018-07-30 ENCOUNTER — OFFICE VISIT (OUTPATIENT)
Dept: FAMILY MEDICINE | Facility: CLINIC | Age: 73
End: 2018-07-30
Payer: MEDICARE

## 2018-07-30 VITALS
HEART RATE: 57 BPM | HEIGHT: 74 IN | DIASTOLIC BLOOD PRESSURE: 69 MMHG | SYSTOLIC BLOOD PRESSURE: 133 MMHG | WEIGHT: 226 LBS | TEMPERATURE: 97.2 F | BODY MASS INDEX: 29 KG/M2 | OXYGEN SATURATION: 96 %

## 2018-07-30 DIAGNOSIS — R18.8 OTHER ASCITES: Primary | ICD-10-CM

## 2018-07-30 DIAGNOSIS — J30.0 CHRONIC VASOMOTOR RHINITIS: ICD-10-CM

## 2018-07-30 DIAGNOSIS — K70.30 ALCOHOLIC CIRRHOSIS OF LIVER WITHOUT ASCITES (H): ICD-10-CM

## 2018-07-30 DIAGNOSIS — N20.0 NEPHROLITHIASIS: ICD-10-CM

## 2018-07-30 DIAGNOSIS — R19.7 DIARRHEA, UNSPECIFIED TYPE: ICD-10-CM

## 2018-07-30 LAB
BACTERIA SPEC CULT: ABNORMAL
Lab: ABNORMAL
SPECIMEN SOURCE: ABNORMAL

## 2018-07-30 PROCEDURE — 99496 TRANSJ CARE MGMT HIGH F2F 7D: CPT | Performed by: INTERNAL MEDICINE

## 2018-07-30 RX ORDER — IPRATROPIUM BROMIDE 42 UG/1
2 SPRAY, METERED NASAL 4 TIMES DAILY PRN
Qty: 3 BOX | Refills: 3 | Status: ON HOLD | OUTPATIENT
Start: 2018-07-30 | End: 2018-10-30

## 2018-07-30 NOTE — PROGRESS NOTES
SUBJECTIVE:   Antonio Ramirez is a 73 year old male who presents to clinic today for the following health issues:        Hospital Follow-up Visit:    Hospital/Nursing Home/IP Rehab Facility: [facility]  Date of Admission: 7/23/2018  Date of Discharge: 7/25/2018  Reason(s) for Admission: Sepsis            Problems taking medications regularly:  None       Medication changes since discharge: None       Problems adhering to non-medication therapy:  None    Summary of hospitalization:  [facility] discharge summary reviewed  Diagnostic Tests/Treatments reviewed.  Follow up needed: urology   Other Healthcare Providers Involved in Patient s Care:         None  Update since discharge: improved.     Post Discharge Medication Reconciliation: discharge medications reconciled, continue medications without change.  Plan of care communicated with patient and family     Coding guidelines for this visit:  Type of Medical   Decision Making Face-to-Face Visit       within 7 Days of discharge Face-to-Face Visit        within 14 days of discharge   Moderate Complexity [id number]   High Complexity [id number]              73 year old man with multiple problems  Hospitalized for urosepsis complicated by obstructing stones  Second hospitalization in last few months  Complains of diarrhea since discharge  No blood no nausea or vomiting  Has cirrhosis with ascites due to alcohol and still drinks  Feels like he needs a paracentesis after aggressive fluid resuscitation during sepsis hospitalization  He fell and sustained multiple skin abrasions prior to admission   He is on xarelto   He does not think that flonase is helping his nasal congestion enough       Problem list and histories reviewed & adjusted, as indicated.  Additional history: as documented    Patient Active Problem List   Diagnosis     Alcohol abuse     Alcoholic cirrhosis of liver (H)     Chronic kidney disease, stage III (moderate)     Physical  deconditioning     Prostate cancer -- S/P Radiative Seed implants in 2000 (no problems since)     Antiphospholipid Jina Syn, Hypercoag (DVT, PE) -- has IVC filt and Warfarin     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 (H)     Thoracic aortic aneurysm without rupture (H)     Chronic congestive heart failure, unspecified congestive heart failure type (H)     Paroxysmal a-fib (H)     Thrombocytopenia -- suspect related to alcohol use     S/P craniotomy     Subdural hematoma (H)     Benign essential hypertension     Malabsorption of iron     Sepsis (H)     Past Surgical History:   Procedure Laterality Date     AMPUTATE LEG BELOW KNEE Left 04/2014    related to gangrene, started as foot ulcer related to neuropathy     AMPUTATE LEG BELOW KNEE Left 12/4/2017    Procedure: AMPUTATE LEG BELOW KNEE;  Exploration of Left Leg Below Knee Amputation Stump with Ligation of Bleeders.;  Surgeon: Leandro Arreola MD;  Location:  OR     APPENDECTOMY       APPLY WOUND VAC Left 12/6/2017    Procedure: APPLY WOUND VAC;;  Surgeon: Saima Howard MD;  Location:  SD     ARTHROPLASTY KNEE Right 9/19/2014    Procedure: ARTHROPLASTY KNEE;  Surgeon: Saima Howard MD;  Location:  OR     CARDIAC SURGERY      CABG     CHOLECYSTECTOMY       COLONOSCOPY       COMBINED CYSTOSCOPY, RETROGRADES, EXCHANGE STENT URETER(S) Left 7/24/2018    Procedure: COMBINED CYSTOSCOPY, RETROGRADES, EXCHANGE STENT URETER(S);  CYSTOSCOPY, BILATERAL RETROGRADES, BILATERAL URETERAL STENT EXCHANGE ;  Surgeon: Matt Cervantes MD;  Location:  OR     CRANIOTOMY Right 4/7/2018    Procedure: CRANIOTOMY;  Right Craniotomy For Subdural Hematoma;  Surgeon: Jono Issa MD;  Location:  OR     CYSTOSCOPY, DILATE URETHRA, COMBINED N/A 10/12/2016    Procedure: COMBINED CYSTOSCOPY, DILATE URETHRA;  Surgeon: Dimitry Bello MD;  Location:  OR     CYSTOSCOPY, REMOVE STENT(S), COMBINED Right 7/24/2018    Procedure: COMBINED  CYSTOSCOPY, REMOVE STENT(S);;  Surgeon: Jose Hunter MD;  Location:  OR     ENDOSCOPIC STRIPPING VEIN(S)       esophagogastroduodenoscopy       EYE SURGERY      cataracts     GENITOURINARY SURGERY      Prostate Seen Implants     HERNIA REPAIR      Umbilical     INCISION AND DRAINAGE LOWER EXTREMITY, COMBINED Left 2017    Procedure: COMBINED INCISION AND DRAINAGE LOWER EXTREMITY;  INCISION AND DRAINAGE OF LEFT BELOW KNEE AMPUTATION ABSCESS, WOUND VAC PLACEMENT;  Surgeon: Saima Howard MD;  Location:  OR     IR IVC FILTER PLACEMENT       IRRIGATION AND DEBRIDEMENT LOWER EXTREMITY, COMBINED Left 2017    Procedure: COMBINED IRRIGATION AND DEBRIDEMENT LOWER EXTREMITY;  IRRIGATION AND DEBRIDEMENT OF LEFT BELOW KNEE AMPUTATION SITE WITH WOUND VAC REPLACEMENT;  Surgeon: Saima Howard MD;  Location:  SD     IRRIGATION AND DEBRIDEMENT LOWER EXTREMITY, COMBINED Left 2017    Procedure: COMBINED IRRIGATION AND DEBRIDEMENT LOWER EXTREMITY;  IRRIGATION AND DEBRIDEMENT OF LEFT BELOW THE KNEE AMPUTATION AND SHORTENING OF THE TIBIA WITH WOUND CLOSURE;  Surgeon: Saima Howard MD;  Location:  OR     LASER HOLMIUM LITHOTRIPSY URETER(S), INSERT STENT, COMBINED Bilateral 2018    Procedure: COMBINED CYSTOSCOPY, URETEROSCOPY, LASER HOLMIUM LITHOTRIPSY URETER(S), INSERT STENT;  CYSTOSCOPY, BILATERAL URETEROSCOPY,  HOLMIUM LASER LITHOTRIPSY, BILATERAL STENT PLACEMENT, STONE BASKETING;  Surgeon: Jose Hunter MD;  Location:  OR     ORTHOPEDIC SURGERY      (R) knee scope     ORTHOPEDIC SURGERY      total hip      ORTHOPEDIC SURGERY      elbow surgery       Social History   Substance Use Topics     Smoking status: Never Smoker     Smokeless tobacco: Never Used     Alcohol use Yes      Comment: quit 10/2015; now 5-6 Vodkas/night     Family History   Problem Relation Age of Onset     Lung Cancer Mother      Heart Failure Father       age 87         Current Outpatient Prescriptions  "  Medication Sig Dispense Refill     aspirin 81 MG tablet Take 81 mg by mouth daily       carvedilol (COREG) 3.125 MG tablet Take 1 tablet (3.125 mg) by mouth 2 times daily (with meals) 180 tablet 1     fluticasone (FLONASE) 50 MCG/ACT spray Spray 1 spray into both nostrils 2 times daily        gabapentin (NEURONTIN) 600 MG tablet Take 1 tablet (600 mg) by mouth 3 times daily 270 tablet 3     phenazopyridine (PYRIDIUM) 100 MG tablet Take 1 tablet (100 mg) by mouth 3 times daily as needed for urinary tract discomfort 90 tablet 3     rivaroxaban ANTICOAGULANT (XARELTO) 10 MG TABS tablet Take 1 tablet (10 mg) by mouth daily (with dinner)       sulfamethoxazole-trimethoprim (BACTRIM DS/SEPTRA DS) 800-160 MG per tablet Take 1 tablet by mouth 2 times daily for 21 days 42 tablet 0     torsemide (DEMADEX) 20 MG tablet Take 20 mg by mouth daily       Allergies   Allergen Reactions     Ciprofloxacin Itching     Severe itching \"like ants were crawling\"     Hmg-Coa-R Inhibitors Other (See Comments)     Rhabdomyolysis occurred within a couple days       Reviewed and updated as needed this visit by clinical staff       Reviewed and updated as needed this visit by Provider         ROS:  Constitutional, HEENT, cardiovascular, pulmonary, gi and gu systems are negative, except as otherwise noted.    OBJECTIVE:     /69 (BP Location: Right arm, Cuff Size: Adult Large)  Pulse 57  Temp 97.2  F (36.2  C) (Tympanic)  Ht 6' 2\" (1.88 m)  Wt 226 lb (102.5 kg)  SpO2 96%  BMI 29.02 kg/m2  Body mass index is 29.02 kg/(m^2).  GENERAL: healthy, alert and no distress  HENT: ear canals and TM's normal, nose and mouth without ulcers or lesions  RESP: lungs clear to auscultation - no rales, rhonchi or wheezes  CV: Heart with regular rate and rhythm.   ABDOMEN: Slight distention, not tender, no masses, bowel sounds present   MS: Left leg BKA, right leg with trace edema   SKIN: Multiple superficial abrasions, dressings changed by myself and " MA, no evidence of inection  NEURO: Normal strength and tone, mentation intact and speech normal  PSYCH: mentation appears normal, affect normal/bright    Diagnostic Test Results:  Results for orders placed or performed during the hospital encounter of 07/23/18   XR Chest 2 Views    Narrative    XR CHEST 2 VW 7/23/2018 2:14 PM    HISTORY: fever, cough, pneumonia;       Impression    IMPRESSION: Postoperative change. Shallow inspiration with some mild  atelectasis in the lung bases. Chest otherwise negative.    MIKO ALSTON MD   CT Abdomen Pelvis w/o Contrast    Narrative    CT ABDOMEN AND PELVIS WITHOUT CONTRAST 7/24/2018 3:41 PM     HISTORY: Left ureteral stones.     COMPARISON: May 22, 2018    TECHNIQUE: Axial CT images of the abdomen and pelvis from the  diaphragm to the symphysis pubis were acquired without IV contrast.  Radiation dose for this scan was reduced using automated exposure  control, adjustment of the mA and/or kV according to patient size, or  iterative reconstruction technique.    FINDINGS: 3 mm stone adjacent to the stent in the region of the  crossing of the iliac vasculature in the left ureter. Stone versus  cluster of stones in the inferior pole of the left kidney measuring  1.8 cm. Bilateral ureteral stents in place. No right-sided stones. No  hydronephrosis. There is mild bilateral perinephric fat stranding.  Kidneys are normal in size and configuration. Small amount of free  fluid. No free air. There is moderate diverticulosis without evidence  for diverticulitis. There are no dilated loops of small intestine or  large bowel to suggest ileus or obstruction. There are extensive  atherosclerotic changes of the visualized aorta and its branches.  There is no evidence of aortic aneurysm. No splenomegaly. No definite  adrenal nodules. Pancreas grossly unremarkable. The remainder of the  visualized abdomen is unremarkable on this noncontrast scan. Survey of  the visualized bony structures  demonstrates no destructive bony  lesions. Small right and trace left pleural effusion with associated  atelectasis and/or infiltrate. There are extensive coronary vascular  calcifications and/or stents consistent with coronary artery disease.      Impression    IMPRESSION:   1. 3 mm stone adjacent to the stent approximately at the level of the  crossing of the iliac vasculature on the left.  2. Stone or cluster of stones in the inferior pole of the left kidney  measuring 1.8 cm.    CARMEN SERRATO MD   XR Surgery VIK L/T 5 Min Fluoro w Stills    Narrative    This exam was marked as non-reportable because it will not be read by a   radiologist or a [place] non-radiologist provider.             CBC with platelets differential   Result Value Ref Range    WBC 7.1 4.0 - 11.0 10e9/L    RBC Count 3.78 (L) 4.4 - 5.9 10e12/L    Hemoglobin 10.9 (L) 13.3 - 17.7 g/dL    Hematocrit 34.0 (L) 40.0 - 53.0 %    MCV 90 78 - 100 fl    MCH 28.8 26.5 - 33.0 pg    MCHC 32.1 31.5 - 36.5 g/dL    RDW 22.8 (H) 10.0 - 15.0 %    Platelet Count 82 (L) 150 - 450 10e9/L    Diff Method Automated Method     % Neutrophils 86.8 %    % Lymphocytes 3.9 %    % Monocytes 8.8 %    % Eosinophils 0.0 %    % Basophils 0.4 %    % Immature Granulocytes 0.1 %    Nucleated RBCs 0 0 /100    Absolute Neutrophil 6.2 1.6 - 8.3 10e9/L    Absolute Lymphocytes 0.3 (L) 0.8 - 5.3 10e9/L    Absolute Monocytes 0.6 0.0 - 1.3 10e9/L    Absolute Eosinophils 0.0 0.0 - 0.7 10e9/L    Absolute Basophils 0.0 0.0 - 0.2 10e9/L    Abs Immature Granulocytes 0.0 0 - 0.4 10e9/L    Absolute Nucleated RBC 0.0    Basic metabolic panel   Result Value Ref Range    Sodium 135 133 - 144 mmol/L    Potassium 4.3 3.4 - 5.3 mmol/L    Chloride 103 94 - 109 mmol/L    Carbon Dioxide 24 20 - 32 mmol/L    Anion Gap 8 3 - 14 mmol/L    Glucose 104 (H) 70 - 99 mg/dL    Urea Nitrogen 31 (H) 7 - 30 mg/dL    Creatinine 1.91 (H) 0.66 - 1.25 mg/dL    GFR Estimate 34 (L) >60 mL/min/1.7m2    GFR Estimate If  Black 42 (L) >60 mL/min/1.7m2    Calcium 8.1 (L) 8.5 - 10.1 mg/dL   UA with Microscopic   Result Value Ref Range    Color Urine Yellow     Appearance Urine Cloudy     Glucose Urine Negative NEG^Negative mg/dL    Bilirubin Urine Negative NEG^Negative    Ketones Urine Negative NEG^Negative mg/dL    Specific Gravity Urine 1.015 1.003 - 1.035    Blood Urine Moderate (A) NEG^Negative    pH Urine 6.0 5.0 - 7.0 pH    Protein Albumin Urine 100 (A) NEG^Negative mg/dL    Urobilinogen mg/dL 2.0 0.0 - 2.0 mg/dL    Nitrite Urine Negative NEG^Negative    Leukocyte Esterase Urine Large (A) NEG^Negative    Source Midstream Urine     WBC Urine >182 (H) 0 - 5 /HPF    RBC Urine >182 (H) 0 - 2 /HPF    WBC Clumps Present (A) NEG^Negative /HPF    Bacteria Urine Many (A) NEG^Negative /HPF    Squamous Epithelial /HPF Urine 2 (H) 0 - 1 /HPF    Transitional Epi <1 0 - 1 /HPF    Mucous Urine Present (A) NEG^Negative /LPF   Lactic acid   Result Value Ref Range    Lactic Acid 2.9 (H) 0.4 - 2.0 mmol/L   Lactic acid   Result Value Ref Range    Lactic Acid 1.8 0.7 - 2.0 mmol/L   Comprehensive metabolic panel   Result Value Ref Range    Sodium 135 133 - 144 mmol/L    Potassium 4.1 3.4 - 5.3 mmol/L    Chloride 106 94 - 109 mmol/L    Carbon Dioxide 18 (L) 20 - 32 mmol/L    Anion Gap 11 3 - 14 mmol/L    Glucose 86 70 - 99 mg/dL    Urea Nitrogen 34 (H) 7 - 30 mg/dL    Creatinine 1.67 (H) 0.66 - 1.25 mg/dL    GFR Estimate 40 (L) >60 mL/min/1.7m2    GFR Estimate If Black 49 (L) >60 mL/min/1.7m2    Calcium 7.3 (L) 8.5 - 10.1 mg/dL    Bilirubin Total 1.3 0.2 - 1.3 mg/dL    Albumin 2.6 (L) 3.4 - 5.0 g/dL    Protein Total 6.1 (L) 6.8 - 8.8 g/dL    Alkaline Phosphatase 64 40 - 150 U/L    ALT 17 0 - 70 U/L    AST 30 0 - 45 U/L   CBC with platelets differential   Result Value Ref Range    WBC 5.5 4.0 - 11.0 10e9/L    RBC Count 3.44 (L) 4.4 - 5.9 10e12/L    Hemoglobin 10.0 (L) 13.3 - 17.7 g/dL    Hematocrit 30.4 (L) 40.0 - 53.0 %    MCV 88 78 - 100 fl    MCH  29.1 26.5 - 33.0 pg    MCHC 32.9 31.5 - 36.5 g/dL    RDW 22.8 (H) 10.0 - 15.0 %    Platelet Count 66 (L) 150 - 450 10e9/L    Diff Method Automated Method     % Neutrophils 76.0 %    % Lymphocytes 6.6 %    % Monocytes 16.4 %    % Eosinophils 0.4 %    % Basophils 0.4 %    % Immature Granulocytes 0.2 %    Nucleated RBCs 0 0 /100    Absolute Neutrophil 4.2 1.6 - 8.3 10e9/L    Absolute Lymphocytes 0.4 (L) 0.8 - 5.3 10e9/L    Absolute Monocytes 0.9 0.0 - 1.3 10e9/L    Absolute Eosinophils 0.0 0.0 - 0.7 10e9/L    Absolute Basophils 0.0 0.0 - 0.2 10e9/L    Abs Immature Granulocytes 0.0 0 - 0.4 10e9/L    Absolute Nucleated RBC 0.0    CBC with platelets differential   Result Value Ref Range    WBC 3.9 (L) 4.0 - 11.0 10e9/L    RBC Count 3.54 (L) 4.4 - 5.9 10e12/L    Hemoglobin 10.3 (L) 13.3 - 17.7 g/dL    Hematocrit 31.6 (L) 40.0 - 53.0 %    MCV 89 78 - 100 fl    MCH 29.1 26.5 - 33.0 pg    MCHC 32.6 31.5 - 36.5 g/dL    RDW 22.4 (H) 10.0 - 15.0 %    Platelet Count 76 (L) 150 - 450 10e9/L    Diff Method Automated Method     % Neutrophils 83.7 %    % Lymphocytes 8.1 %    % Monocytes 7.9 %    % Eosinophils 0.0 %    % Basophils 0.0 %    % Immature Granulocytes 0.3 %    Nucleated RBCs 0 0 /100    Absolute Neutrophil 3.3 1.6 - 8.3 10e9/L    Absolute Lymphocytes 0.3 (L) 0.8 - 5.3 10e9/L    Absolute Monocytes 0.3 0.0 - 1.3 10e9/L    Absolute Eosinophils 0.0 0.0 - 0.7 10e9/L    Absolute Basophils 0.0 0.0 - 0.2 10e9/L    Abs Immature Granulocytes 0.0 0 - 0.4 10e9/L    Absolute Nucleated RBC 0.0    Basic metabolic panel   Result Value Ref Range    Sodium 137 133 - 144 mmol/L    Potassium 4.2 3.4 - 5.3 mmol/L    Chloride 107 94 - 109 mmol/L    Carbon Dioxide 21 20 - 32 mmol/L    Anion Gap 9 3 - 14 mmol/L    Glucose 149 (H) 70 - 99 mg/dL    Urea Nitrogen 31 (H) 7 - 30 mg/dL    Creatinine 1.40 (H) 0.66 - 1.25 mg/dL    GFR Estimate 49 (L) >60 mL/min/1.7m2    GFR Estimate If Black 60 (L) >60 mL/min/1.7m2    Calcium 7.2 (L) 8.5 - 10.1 mg/dL    Glucose by meter   Result Value Ref Range    Glucose 153 (H) 70 - 99 mg/dL   Glucose by meter   Result Value Ref Range    Glucose 169 (H) 70 - 99 mg/dL   EKG 12-lead, tracing only   Result Value Ref Range    Interpretation ECG Click View Image link to view waveform and result    Urology IP Consult: urosepsis, h/o stents, already discussed with Dr. Howe; Consultant may enter orders: Yes; Patient to be seen: Routine - within 24 hours; Requested Clinic[facility]Group: [facility]    Lai Horton PA-C     7/23/2018  5:11 PM  [facility]    Urology Consultation     Date of Admission:  7/23/2018    Assessment & Plan   Antonio Ramirez is a 73 year old male who was admitted on   7/23/2018. I was asked to see the patient for urosepsis with   bilateral ureteral stents.    Vitals wnl    Cr slightly elevated at 1.9 when previously was 1.2    Urine positive and with stents in place must assume infection   has spread to kidneys    Responded well to fluid resuscitation in ER with lactate   dropping from 2.9 to 1.8.     Patient is septic, but not in shock    Patient received pip-tazo and ceftriaxone in ED    Plan:     Admit to medicine    Cont Abx    Plan on cystoscopy, right retrograde, stent removal, and left   stent exchange tomorrow in OR    NPO after midnight    Will postpone left stone removal until infection has resolved    *Discussed this case in detail with Dr. Hunter who approved of   the plan.      Lai Donis PA-C 7/23/2018 4:36 PM  [facility]  Mon-Fri, 7am - 5pm  Text page [phone]  Office: [phone]      Code Status    Prior    Reason for Consult   Reason for consult: I was asked by Dr. Chowdary to evaluate this   patient for urosepsis.    Primary Care Physician   Main Hardy    Chief Complaint   Fever, chills    History is obtained from the patient    History of Present Illness   Antonio Ramirez is a 73 year old male who presents with fever and   chills for  the last week. He was scheduled for left ureteroscopy   and stone removal but had to cancel his operation because he had   been feeling feverish and chills over the weekend. Had fever and   chills In May he had bilateral stones and was septic with   bilateral perc neph tubes placed. Both were internalized to   stents, and Dr. Hunter performed ureteroscopy to remove the   stones on the right side. The patient currently has stones on   left side and stents bilaterally. Has not had anything to eat   since last night.    Past Medical History   I have reviewed this patient's medical history and updated it   with pertinent information if needed.   Past Medical History:   Diagnosis Date     Alcoholism (H)      Amputated left leg (H)      Anemia      Anticardiolipin antibody syndrome (H)      Antiplatelet or antithrombotic long-term use      Aortic stenosis      Arthritis      Balance problem      Cardiomyopathy (H)      Coagulation disorder (H)     cardiolipinantibody syndrome     Coronary artery disease      Gastro-oesophageal reflux disease      Heart attack 2007    apparently arrested, cardiac X5     Hiatal hernia      Hypercholesterolemia      Hypertension      Left foot drop      Liver disease     alcoholic liver disease, alcoholic cirrohsosis, portal   hypertension     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)     ?When diagnosed, patient not aware of this     Moderate mitral regurgitation      Monoclonal gammopathy      Neuropathy      Noninfectious ileitis     diverticulitis of colon     PE (pulmonary embolism) 2006     Prostate cancer (H) 2000    Treated with radiation (seed implants in 2000) -- no problems   since     Renal disease     kidney stones     Syncope      Thoracic aortic aneurysm, without rupture      Thrombocytopenia (H)      Thrombosis of leg      Urethral stricture        Past Surgical History   I have reviewed this patient's surgical history and updated it   with pertinent information if  needed.  Past Surgical History:   Procedure Laterality Date     AMPUTATE LEG BELOW KNEE Left 04/2014    related to gangrene, started as foot ulcer related to neuropathy       AMPUTATE LEG BELOW KNEE Left 12/4/2017    Procedure: AMPUTATE LEG BELOW KNEE;  Exploration of Left Leg   Below Knee Amputation Stump with Ligation of Bleeders.;  Surgeon:   Leandro Arreola MD;  Location:  OR     APPENDECTOMY       APPLY WOUND VAC Left 12/6/2017    Procedure: APPLY WOUND VAC;;  Surgeon: Saima Howard MD;    Location:  SD     ARTHROPLASTY KNEE Right 9/19/2014    Procedure: ARTHROPLASTY KNEE;  Surgeon: Saima Howard MD;    Location:  OR     CARDIAC SURGERY      CABG     CHOLECYSTECTOMY       COLONOSCOPY       CRANIOTOMY Right 4/7/2018    Procedure: CRANIOTOMY;  Right Craniotomy For Subdural Hematoma;    Surgeon: Jono Issa MD;  Location:  OR     CYSTOSCOPY, DILATE URETHRA, COMBINED N/A 10/12/2016    Procedure: COMBINED CYSTOSCOPY, DILATE URETHRA;  Surgeon:   Dimitry Bello MD;  Location:  OR     ENDOSCOPIC STRIPPING VEIN(S)       esophagogastroduodenoscopy       EYE SURGERY      cataracts     GENITOURINARY SURGERY      Prostate Seen Implants     HERNIA REPAIR      Umbilical     INCISION AND DRAINAGE LOWER EXTREMITY, COMBINED Left 12/1/2017    Procedure: COMBINED INCISION AND DRAINAGE LOWER EXTREMITY;    INCISION AND DRAINAGE OF LEFT BELOW KNEE AMPUTATION ABSCESS,   WOUND VAC PLACEMENT;  Surgeon: Saima Howard MD;  Location:    OR     IR IVC FILTER PLACEMENT       IRRIGATION AND DEBRIDEMENT LOWER EXTREMITY, COMBINED Left   12/6/2017    Procedure: COMBINED IRRIGATION AND DEBRIDEMENT LOWER EXTREMITY;    IRRIGATION AND DEBRIDEMENT OF LEFT BELOW KNEE AMPUTATION SITE   WITH WOUND VAC REPLACEMENT;  Surgeon: Saima Howard MD;    Location:  SD     IRRIGATION AND DEBRIDEMENT LOWER EXTREMITY, COMBINED Left   12/8/2017    Procedure: COMBINED IRRIGATION AND DEBRIDEMENT LOWER EXTREMITY;     IRRIGATION AND DEBRIDEMENT OF LEFT BELOW THE KNEE AMPUTATION AND   SHORTENING OF THE TIBIA WITH WOUND CLOSURE;  Surgeon: Saima Howard MD;  Location:  OR     LASER HOLMIUM LITHOTRIPSY URETER(S), INSERT STENT, COMBINED   Bilateral 6/28/2018    Procedure: COMBINED CYSTOSCOPY, URETEROSCOPY, LASER HOLMIUM   LITHOTRIPSY URETER(S), INSERT STENT;  CYSTOSCOPY, BILATERAL   URETEROSCOPY,  HOLMIUM LASER LITHOTRIPSY, BILATERAL STENT   PLACEMENT, STONE BASKETING;  Surgeon: Jose Hunter MD;    Location:  OR     ORTHOPEDIC SURGERY      (R) knee scope     ORTHOPEDIC SURGERY      total hip      ORTHOPEDIC SURGERY      elbow surgery       Prior to Admission Medications   Prior to Admission Medications   Prescriptions Last Dose Informant Patient Reported? Taking?   HYDROcodone-acetaminophen (NORCO) 5-325 MG per tablet   No No   Sig: Take 1-2 tablets by mouth every 4 hours as needed for severe   pain (Moderate to Severe Pain)   aspirin 81 MG tablet   Yes No   Sig: Take 81 mg by mouth daily   carvedilol (COREG) 3.125 MG tablet  Self Yes No   Sig: Take 1 tablet (3.125 mg) by mouth 2 times daily (with meals)     fluticasone (FLONASE) 50 MCG/ACT spray  Self Yes No   Sig: Spray 2 sprays into both nostrils daily   gabapentin (NEURONTIN) 600 MG tablet   No No   Sig: Take 1 tablet (600 mg) by mouth 3 times daily   oxyCODONE IR (ROXICODONE) 5 MG tablet   No No   Sig: Take 1 tablet (5 mg) by mouth every 6 hours as needed for   other (pain control or improvement in physical function. Hold   dose for analgesic side effects.)   phenazopyridine (PYRIDIUM) 100 MG tablet   No No   Sig: Take 1 tablet (100 mg) by mouth 3 times daily as needed for   urinary tract discomfort   rivaroxaban ANTICOAGULANT (XARELTO) 10 MG TABS tablet   Yes No   Sig: Take 1 tablet (10 mg) by mouth daily (with dinner)   torsemide (DEMADEX) 20 MG tablet  Self Yes No   Sig: Take 20 mg by mouth daily      Facility-Administered Medications: None     Allergies  "  Allergies   Allergen Reactions     Ciprofloxacin Itching     Severe itching \"like ants were crawling\"     Hmg-Coa-R Inhibitors Other (See Comments)     Rhabdomyolysis occurred within a couple days       Social History   I have reviewed this patient's social history and updated it with   pertinent information if needed. Antonio Ramirez  reports that he   has never smoked. He has never used smokeless tobacco. He reports   that he drinks alcohol. He reports that he does not use illicit   drugs.    Family History   I have reviewed this patient's family history and updated it with   pertinent information if needed.   Family History   Problem Relation Age of Onset     Lung Cancer Mother      Heart Failure Father       age 87       Review of Systems   The 10 point Review of Systems is negative other than noted in   the HPI or here.    Physical Exam   Temp: 100.5  F (38.1  C) Temp src: Oral BP: 103/48   Heart Rate:   70 Resp: 16 SpO2: 96 %      Vital Signs with Ranges  Temp:  [99.3  F (37.4  C)-101.1  F (38.4  C)] 100.5  F (38.1  C)  Heart Rate:  [70] 70  Resp:  [16] 16  BP: (103-143)/(48-86) 103/48  SpO2:  [94 %-98 %] 96 %  209 lbs 0 oz    General: Patient reclined in bed resting comfortably  Head: Normocephalic, atraumatic  Eyes: No icterus  Neck: Symmetric  Respiratory: Breathing unlabored  Cardiac: Skin well-perfused  Skin: no visible rashes   Extremities: No LE edema, no calf pain  Neurologic: No focal deficits  Neuropsychiatric: A&O x 3, responding appropriately        Data   Results for orders placed or performed during the hospital   encounter of 18 (from the past 24 hour(s))   CBC with platelets differential   Result Value Ref Range    WBC 7.1 4.0 - 11.0 10e9/L    RBC Count 3.78 (L) 4.4 - 5.9 10e12/L    Hemoglobin 10.9 (L) 13.3 - 17.7 g/dL    Hematocrit 34.0 (L) 40.0 - 53.0 %    MCV 90 78 - 100 fl    MCH 28.8 26.5 - 33.0 pg    MCHC 32.1 31.5 - 36.5 g/dL    RDW 22.8 (H) 10.0 - 15.0 %    Platelet Count 82 " (L) 150 - 450 10e9/L    Diff Method Automated Method     % Neutrophils 86.8 %    % Lymphocytes 3.9 %    % Monocytes 8.8 %    % Eosinophils 0.0 %    % Basophils 0.4 %    % Immature Granulocytes 0.1 %    Nucleated RBCs 0 0 /100    Absolute Neutrophil 6.2 1.6 - 8.3 10e9/L    Absolute Lymphocytes 0.3 (L) 0.8 - 5.3 10e9/L    Absolute Monocytes 0.6 0.0 - 1.3 10e9/L    Absolute Eosinophils 0.0 0.0 - 0.7 10e9/L    Absolute Basophils 0.0 0.0 - 0.2 10e9/L    Abs Immature Granulocytes 0.0 0 - 0.4 10e9/L    Absolute Nucleated RBC 0.0    Basic metabolic panel   Result Value Ref Range    Sodium 135 133 - 144 mmol/L    Potassium 4.3 3.4 - 5.3 mmol/L    Chloride 103 94 - 109 mmol/L    Carbon Dioxide 24 20 - 32 mmol/L    Anion Gap 8 3 - 14 mmol/L    Glucose 104 (H) 70 - 99 mg/dL    Urea Nitrogen 31 (H) 7 - 30 mg/dL    Creatinine 1.91 (H) 0.66 - 1.25 mg/dL    GFR Estimate 34 (L) >60 mL/min/1.7m2    GFR Estimate If Black 42 (L) >60 mL/min/1.7m2    Calcium 8.1 (L) 8.5 - 10.1 mg/dL   Lactic acid   Result Value Ref Range    Lactic Acid 2.9 (H) 0.4 - 2.0 mmol/L   XR Chest 2 Views    Narrative    XR CHEST 2 VW 7/23/2018 2:14 PM    HISTORY: fever, cough, pneumonia;       Impression    IMPRESSION: Postoperative change. Shallow inspiration with some   mild  atelectasis in the lung bases. Chest otherwise negative.    MIKO ALSTON MD   Lactic acid   Result Value Ref Range    Lactic Acid 1.8 0.7 - 2.0 mmol/L   UA with Microscopic   Result Value Ref Range    Color Urine Yellow     Appearance Urine Cloudy     Glucose Urine Negative NEG^Negative mg/dL    Bilirubin Urine Negative NEG^Negative    Ketones Urine Negative NEG^Negative mg/dL    Specific Gravity Urine 1.015 1.003 - 1.035    Blood Urine Moderate (A) NEG^Negative    pH Urine 6.0 5.0 - 7.0 pH    Protein Albumin Urine 100 (A) NEG^Negative mg/dL    Urobilinogen mg/dL 2.0 0.0 - 2.0 mg/dL    Nitrite Urine Negative NEG^Negative    Leukocyte Esterase Urine Large (A) NEG^Negative    Source  Midstream Urine     WBC Urine >182 (H) 0 - 5 /HPF    RBC Urine >182 (H) 0 - 2 /HPF    WBC Clumps Present (A) NEG^Negative /HPF    Bacteria Urine Many (A) NEG^Negative /HPF    Squamous Epithelial /HPF Urine 2 (H) 0 - 1 /HPF    Transitional Epi <1 0 - 1 /HPF    Mucous Urine Present (A) NEG^Negative /LPF                Care Coordinator IP Consult    Narrative    Atiya Aleman RN     7/25/2018  3:51 PM  Care Transition Initial Assessment - RN        Met with: Patient and his Spouse Helena SÁNCHEZ   Active Problems:    Sepsis (H)       Cognitive Status: awake, alert and oriented.        Contact information and PCP information verified: Yes  Lives With: spouse  Living Arrangements: apartment     Insurance concerns: No Insurance issues identified  ASSESSMENT  Patient currently receives the following services:  NA        Identified issues/concerns regarding health management:No issues   are identified at present.  Pt really wanted to be discharged.    Pt had some delirium during night and does drink alcohol daily.    This has cleared.  Pt is a retired  and continues   to be busy.  PT has recommended outpt PT.  Pt has a personal    that is a Physical Therapist that he sees twice a week at   baseline, so he may decline additional PT services.       PLAN  Financial costs for the patient include:NA  Patient/family is agreeable to the plan?  Yes: home today on oral   antibiotics       Patient anticipates needs for home equipment: No  Plan/Disposition: Home     Appointments:   PCP 7/30/18,   Urology Surgery 8/14/18      Care  (CTS) will continue to follow as   needed.           ABO/Rh type and screen   Result Value Ref Range    ABO O     RH(D) Pos     Antibody Screen Neg     Test Valid Only At [facility]        Specimen Expires 07/27/2018    Blood culture   Result Value Ref Range    Specimen Description Blood Right Arm     Special Requests Aerobic and anaerobic bottles  received     Culture Micro (A)      Cultured on the 1st day of incubation:  Klebsiella pneumoniae      Culture Micro       Critical Value/Significant Value, preliminary result only, called to and read back by  Dusty Meier RN on 7.24.18 at 0942. bw      Culture Micro       (Note)  NEGATIVE for the following organisms and resistance markers:  Acinetobacter sp., Citrobacter sp., Enterobacter sp., Proteus sp., E.  coli, K. pneumoniae/oxytoca, P. aeruginosa, CTX-M, KPC, NDM, VIM, IMP  and OXA by [employer] multiplex nucleic acid test. Final identification  and antimicrobial susceptibility testing will be verified by standard  methods.    Critical Value/Significant Value called to and read back by DUSTY MEIER RN 1157 7.24.18 NDP         Susceptibility    Klebsiella pneumoniae - GAYLE     AMIKACIN <=2 Sensitive ug/mL     AMPICILLIN*  Resistant ug/mL      * Intrinsically Resistant     AMPICILLIN/SULBACTAM 4 Sensitive ug/mL     CEFEPIME <=1 Sensitive ug/mL     CEFTAZIDIME <=1 Sensitive ug/mL     CEFTRIAXONE <=1 Sensitive ug/mL     CIPROFLOXACIN <=0.25 Sensitive ug/mL     GENTAMICIN <=1 Sensitive ug/mL     LEVOFLOXACIN <=0.12 Sensitive ug/mL     Piperacillin/Tazo <=4 Sensitive ug/mL     TOBRAMYCIN <=1 Sensitive ug/mL     Trimethoprim/Sulfa <=1/19 Sensitive ug/mL     MEROPENEM <=0.25 Sensitive ug/mL   Blood culture   Result Value Ref Range    Specimen Description Blood Right Arm     Special Requests Aerobic and anaerobic bottles received     Culture Micro (A)      Cultured on the 2nd day of incubation:  Klebsiella pneumoniae      Culture Micro       Critical Value/Significant Value, preliminary result only, called to and read back by  Janet Donis RN on SH66 at 0900 7/25/18 SRQ      Culture Micro Susceptibility testing done on previous specimen    Urine Culture   Result Value Ref Range    Specimen Description Midstream Urine     Special Requests Specimen received in preservative     Culture Micro >100,000  colonies/mL  Klebsiella pneumoniae   (A)        Susceptibility    Klebsiella pneumoniae - GAYLE     AMPICILLIN*  Resistant ug/mL      * Intrinsically Resistant     CEFAZOLIN* <=4 Sensitive ug/mL      * Cefazolin GAYLE breakpoints are for the treatment of uncomplicated urinary tract infections.  For the treatment of systemic infections, please contact the laboratory for additional testing.     CEFOXITIN <=4 Sensitive ug/mL     CEFTAZIDIME <=1 Sensitive ug/mL     CEFTRIAXONE <=1 Sensitive ug/mL     CIPROFLOXACIN <=0.25 Sensitive ug/mL     GENTAMICIN <=1 Sensitive ug/mL     LEVOFLOXACIN <=0.12 Sensitive ug/mL     NITROFURANTOIN 64 Intermediate ug/mL     TOBRAMYCIN <=1 Sensitive ug/mL     Trimethoprim/Sulfa <=1/19 Sensitive ug/mL     AMPICILLIN/SULBACTAM 4 Sensitive ug/mL     Piperacillin/Tazo <=4 Sensitive ug/mL     CEFEPIME <=1 Sensitive ug/mL   Blood culture   Result Value Ref Range    Specimen Description Blood Left Arm     Special Requests Aerobic and anaerobic bottles received     Culture Micro (A)      Cultured on the 1st day of incubation:  Staphylococcus hominis  This isolate is presumed to be clindamycin resistant based on detection of inducible   clindamycin resistance. Erythromycin and clindamycin are resistant, therefore, they are   not recommended for use.      Culture Micro       Critical Value/Significant Value, preliminary result only, called to and read back by  Ifrah Enamorado RN on SH66 at 1131 7/25/18 [facility]      Culture Micro       (Note)  POSITIVE for Staphylococci other than S.aureus, S.epidermidis and  S.lugdunensis, by [employer] multiplex nucleic acid test.  Coagulase-negative staphylococci are the most common venipuncture or  collection associated skin CONTAMINANTS grown in blood cultures.  Final identification and antimicrobial susceptibility testing will be  verified by standard methods.    Specimen tested with [employer] multiplex, gram-positive blood culture  nucleic acid test for the following  targets: Staph aureus, Staph  epidermidis, Staph lugdunensis, other Staph species, Enterococcus  faecalis, Enterococcus faecium, Streptococcus species, S. agalactiae,  S. anginosus grp., S. pneumoniae, S. pyogenes, Listeria sp., mecA  (methicillin resistance) and Rad/B (vancomycin resistance).    Critical Value/Significant Value called to and read back by Janet Donis RN, @ 8054 7.25.2018 [facility]         Susceptibility    Staphylococcus hominis - GAYLE     CIPROFLOXACIN >=8 Resistant ug/mL     CLINDAMYCIN  Resistant ug/mL     ERYTHROMYCIN >=8 Resistant ug/mL     GENTAMICIN 1 Sensitive ug/mL     LEVOFLOXACIN >=8 Resistant ug/mL     OXACILLIN 1 Resistant ug/mL     PENICILLIN >=0.5 Resistant ug/mL     TETRACYCLINE <=1 Sensitive ug/mL     VANCOMYCIN 1 Sensitive ug/mL       ASSESSMENT/PLAN:       1. Other ascites  If ascites present, then return for ultrasound guided thoracentesis after holding xarelto for 2 days   - US ABDOMEN LIMITED; Future    2. Diarrhea, unspecified type  Check for c. Diff  Probiotics recommended   - Clostridium difficile Toxin B PCR; Future    3. Alcoholic cirrhosis of liver without ascites (H)      4. Nephrolithiasis  Continue follow up with urology as directed  Complete course of bactrim as directed    5. Chronic vasomotor rhinitis  Try below   - ipratropium (ATROVENT) 0.06 % spray; Spray 2 sprays into both nostrils 4 times daily as needed for rhinitis  Dispense: 3 Box; Refill: 3    FUTURE APPOINTMENTS:       - Follow-up visit pending diagnostic tests and symptoms     Main Hardy MD  [facility]

## 2018-07-30 NOTE — MR AVS SNAPSHOT
After Visit Summary   7/30/2018    Antonio Ramirez    MRN: [id number]           Patient Information     Date Of Birth          1945        Visit Information        Provider Department      7/30/2018 2:30 PM Main Hardy MD [facility]        Today's Diagnoses     Other ascites    -  1    Diarrhea, unspecified type        Alcoholic cirrhosis of liver without ascites (H)        Nephrolithiasis        Chronic vasomotor rhinitis           Follow-ups after your visit        Your next 10 appointments already scheduled     Jul 31, 2018  1:00 PM CDT   US ABDOMEN LIMITED with SHUS5   [facility] Ultrasound ([facility])    [address] [facility] [id number] [phone]           Please bring a list of your medicines (including vitamins, minerals and over-the-counter drugs). Also, tell your doctor about any allergies you may have. Wear comfortable clothes and leave your valuables at home.  Adults: No eating or drinking for 8 hours before the exam. You may take medicine with a small sip of water.  Children: - Infants, breast-fed: may have breast milk up to 2 hours before exam. - Infants, formula: may have bottle until 4 hours before exam. - Children 1-5 years: No food or drink for 4 hours before exam. - Children 6 -12 years: No food or drink for 6 hours before exam. - Children over 12 years: No food or drink for 8 hours before exam. - J Tube Fed: No need to stop feedings.  Please call the Imaging Department at your exam site with any questions.            Aug 08, 2018  1:30 PM CDT   Evaluation with Deborah Pereira, PT   [facility] Physical Therapy ([facility])    [address]  [address]  [facility] [address]   [phone]            Aug 14, 2018   Procedure with Jose Hunter MD   [facility] PeriOP Services (--)    [address]  [facility] [address] [phone]            Aug 31, 2018    Procedure with Saima Howard MD   [facility] PeriOP Services (--)    [address]  [place] [id number] [phone]            Oct 02, 2018 11:15 AM CDT   New Visit with Elena Downs MD   [facility] ([facility])    [address]  [place] [id number] [phone] OPT 2              Future tests that were ordered for you today     Open Future Orders        Priority Expected Expires Ordered    US Abdomen Limited Routine  7/30/2019 7/30/2018    Clostridium difficile Toxin B PCR Routine  8/29/2018 7/30/2018    US ABDOMEN LIMITED Routine  9/13/2018 7/30/2018            Who to contact     If you have questions or need follow up information about today's clinic visit or your schedule please contact [facility] directly at [phone].  Normal or non-critical lab and imaging results will be communicated to you by [employer], letter or phone within 4 business days after the clinic has received the results. If you do not hear from us within 7 days, please contact the clinic through [employer] or phone. If you have a critical or abnormal lab result, we will notify you by phone as soon as possible.  Submit refill requests through [email or web address] or call your pharmacy and they will forward the refill request to us. Please allow 3 business days for your refill to be completed.          Additional Information About Your Visit        [employer] Information     [email or web address] gives you secure access to your electronic health record. If you see a primary care provider, you can also send messages to your care team and make appointments. If you have questions, please call your primary care clinic.  If you do not have a primary care provider, please call [phone] and they will assist you.        Care EveryWhere ID     This is your Care EveryWhere ID. This could be used by other organizations to access your [facility]  "records  [id number]        Your Vitals Were     Pulse Temperature Height Pulse Oximetry BMI (Body Mass Index)       57 97.2  F (36.2  C) (Tympanic) 6' 2\" (1.88 m) 96% 29.02 kg/m2        Blood Pressure from Last 3 Encounters:   07/30/18 133/69   07/25/18 120/73   07/23/18 107/55    Weight from Last 3 Encounters:   07/30/18 226 lb (102.5 kg)   07/25/18 218 lb 11.1 oz (99.2 kg)   07/23/18 209 lb 12.8 oz (95.2 kg)                 Today's Medication Changes          These changes are accurate as of 7/30/18  3:31 PM.  If you have any questions, ask your nurse or doctor.               Start taking these medicines.        Dose/Directions    ipratropium 0.06 % spray   Commonly known as:  ATROVENT   Used for:  Chronic vasomotor rhinitis   Started by:  Main Hardy MD        Dose:  2 spray   Spray 2 sprays into both nostrils 4 times daily as needed for rhinitis   Quantity:  3 Box   Refills:  3            Where to get your medicines      These medications were sent to [facility] [address]  [place], [place] - [address] AT [address] [place] [address] & [facility]  [address] [place] [address] [address]    Hours:  24-hours Phone:  [phone]     ipratropium 0.06 % spray                Primary Care Provider Office Phone # Fax #    Main Hardy [phone] [address]  [place] [address]        Equal Access to Services     [facility] AH: Hadii aad ku hadasho Soomaali, waaxda luqadaha, qaybta kaalmada adeegyada, waxay idiin hayaan adeeg kharash la'aan . So [facility] [phone].    ATENCIÓN: Si habla español, tiene a shrestha disposición servicios gratuitos de asistencia lingüística. Llame al [phone].    We comply with applicable federal civil rights laws and [place] laws. We do not discriminate on the basis of race, color, national origin, age, disability, sex, sexual orientation, or gender identity.            Thank you!     Thank you for choosing [facility]  for your care. Our goal is " always to provide you with excellent care. Hearing back from our patients is one way we can continue to improve our services. Please take a few minutes to complete the written survey that you may receive in the mail after your visit with us. Thank you!             Your Updated Medication List - Protect others around you: Learn how to safely use, store and throw away your medicines at www.disposemymeds.org.          This list is accurate as of 7/30/18  3:31 PM.  Always use your most recent med list.                   Brand Name Dispense Instructions for use Diagnosis    aspirin 81 MG tablet      Take 81 mg by mouth daily        carvedilol 3.125 MG tablet    COREG    180 tablet    Take 1 tablet (3.125 mg) by mouth 2 times daily (with meals)        fluticasone 50 MCG/ACT spray    FLONASE     Spray 1 spray into both nostrils 2 times daily        gabapentin 600 MG tablet    NEURONTIN    270 tablet    Take 1 tablet (600 mg) by mouth 3 times daily    Phantom limb pain (H)       ipratropium 0.06 % spray    ATROVENT    3 Box    Spray 2 sprays into both nostrils 4 times daily as needed for rhinitis    Chronic vasomotor rhinitis       phenazopyridine 100 MG tablet    PYRIDIUM    90 tablet    Take 1 tablet (100 mg) by mouth 3 times daily as needed for urinary tract discomfort    Urinary tract infection without hematuria, site unspecified       sulfamethoxazole-trimethoprim 800-160 MG per tablet    BACTRIM DS/SEPTRA DS    42 tablet    Take 1 tablet by mouth 2 times daily for 21 days    Severe sepsis (H), Infection associated with indwelling ureteral stent, initial encounter (H)       torsemide 20 MG tablet    DEMADEX     Take 20 mg by mouth daily        XARELTO 10 MG Tabs tablet   Generic drug:  rivaroxaban ANTICOAGULANT      Take 1 tablet (10 mg) by mouth daily (with dinner)

## 2018-07-31 ENCOUNTER — HOSPITAL ENCOUNTER (OUTPATIENT)
Dept: ULTRASOUND IMAGING | Facility: CLINIC | Age: 73
Discharge: HOME | End: 2018-07-31
Attending: INTERNAL MEDICINE | Admitting: INTERNAL MEDICINE
Payer: MEDICARE

## 2018-07-31 DIAGNOSIS — R18.8 OTHER ASCITES: ICD-10-CM

## 2018-07-31 LAB
BACTERIA SPEC CULT: ABNORMAL
Lab: ABNORMAL
SPECIMEN SOURCE: ABNORMAL

## 2018-07-31 PROCEDURE — 76705 ECHO EXAM OF ABDOMEN: CPT

## 2018-07-31 RX ORDER — IPRATROPIUM BROMIDE 42 UG/1
SPRAY, METERED NASAL
Start: 2018-07-31

## 2018-07-31 NOTE — PROGRESS NOTES
"The following letter pertains to your most recent diagnostic tests:    Good news! The ultrasound shows only a \"small\" amount of ascites fluid.  I suspect that over time symptoms from this should improve as you get some distance from the aggressive fluid resuscitation that took place when you were hospitalized for severe sepsis.  With only a small amount of fluid in the abdomen, I think the risks of holding Xerelto and doing the paracentesis probably outweigh and expected symptom improvement benefit.  However, if symptoms worsen, please inform me and we can re-image and see if more fluid is accumulating that may be amenable to paracentesis (drainage).       Sincerely,    Dr. Hardy"

## 2018-07-31 NOTE — LETTER
"[facility]  [address] [facility]  [address]  [place]  [address]  Tel: [phone]    July 31, 2018    Antonio KU Ashley  [address]   [facility] [address]        Dear Mr. Ramirez,    The following letter pertains to your most recent diagnostic tests:    Good news! The ultrasound shows only a \"small\" amount of ascites fluid.  I suspect that over time symptoms from this should improve as you get some distance from the aggressive fluid resuscitation that took place when you were hospitalized for severe sepsis.  With only a small amount of fluid in the abdomen, I think the risks of holding Xerelto and doing the paracentesis probably outweigh and expected symptom improvement benefit.  However, if symptoms worsen, please inform me and we can re-image and see if more fluid is accumulating that may be amenable to paracentesis (drainage).     Sincerely,    Main Hardy MD/RENETTA                "

## 2018-07-31 NOTE — TELEPHONE ENCOUNTER
Denied  Duplicate - Rx sent 7/30/2018  Annabelle FOSTER RN    Requested Prescriptions   Pending Prescriptions Disp Refills     ipratropium (ATROVENT) 0.06 % spray [Pharmacy Med Name: IPRATROPIUM 0.06% MIROSLAVA SP 15ML (865)] 120 mL 3     Sig: USE 2 SPRAYS IN EACH NOSTRIL FOUR TIMES DAILY AS NEEDED FOR RHINITIS    There is no refill protocol information for this order

## 2018-08-01 DIAGNOSIS — R19.7 DIARRHEA, UNSPECIFIED TYPE: ICD-10-CM

## 2018-08-01 LAB
C DIFF TOX B STL QL: NEGATIVE
SPECIMEN SOURCE: NORMAL

## 2018-08-01 PROCEDURE — 87493 C DIFF AMPLIFIED PROBE: CPT | Performed by: INTERNAL MEDICINE

## 2018-08-01 NOTE — LETTER
[facility]  [address] [facility]  [address]  [place]  [address]  Tel: [phone]    August 2, 2018    Antonio Ramirez  [address]   [facility] [address]        Dear Mr. Ramirez,    The following letter pertains to your most recent diagnostic tests:    Good news! Your C. Diff toxin test is negative.  Your do not have this dangerous colon infection to explain your diarrhea.          Sincerely,    Main Hardy MD/RENETTA          Enclosure: Lab Results  Results for orders placed or performed in visit on 08/01/18   Clostridium difficile Toxin B PCR   Result Value Ref Range    Specimen Description Feces     C Diff Toxin B PCR Negative NEG^Negative

## 2018-08-01 NOTE — PROGRESS NOTES
The following letter pertains to your most recent diagnostic tests:    Good news! Your C. Diff toxin test is negative.  Your do not have this dangerous colon infection to explain your diarrhea.            Sincerely,    Dr. Hardy

## 2018-08-09 NOTE — H&P (VIEW-ONLY)
SUBJECTIVE:   Antonio Ramirez is a 73 year old male who presents to clinic today for the following health issues:        Hospital Follow-up Visit:    Hospital/Nursing Home/IP Rehab Facility: [facility]  Date of Admission: 7/23/2018  Date of Discharge: 7/25/2018  Reason(s) for Admission: Sepsis            Problems taking medications regularly:  None       Medication changes since discharge: None       Problems adhering to non-medication therapy:  None    Summary of hospitalization:  [facility] discharge summary reviewed  Diagnostic Tests/Treatments reviewed.  Follow up needed: urology   Other Healthcare Providers Involved in Patient s Care:         None  Update since discharge: improved.     Post Discharge Medication Reconciliation: discharge medications reconciled, continue medications without change.  Plan of care communicated with patient and family     Coding guidelines for this visit:  Type of Medical   Decision Making Face-to-Face Visit       within 7 Days of discharge Face-to-Face Visit        within 14 days of discharge   Moderate Complexity [id number]   High Complexity [id number]              73 year old man with multiple problems  Hospitalized for urosepsis complicated by obstructing stones  Second hospitalization in last few months  Complains of diarrhea since discharge  No blood no nausea or vomiting  Has cirrhosis with ascites due to alcohol and still drinks  Feels like he needs a paracentesis after aggressive fluid resuscitation during sepsis hospitalization  He fell and sustained multiple skin abrasions prior to admission   He is on xarelto   He does not think that flonase is helping his nasal congestion enough       Problem list and histories reviewed & adjusted, as indicated.  Additional history: as documented    Patient Active Problem List   Diagnosis     Alcohol abuse     Alcoholic cirrhosis of liver (H)     Chronic kidney disease, stage III (moderate)     Physical  deconditioning     Prostate cancer -- S/P Radiative Seed implants in 2000 (no problems since)     Antiphospholipid Jina Syn, Hypercoag (DVT, PE) -- has IVC filt and Warfarin     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 (H)     Thoracic aortic aneurysm without rupture (H)     Chronic congestive heart failure, unspecified congestive heart failure type (H)     Paroxysmal a-fib (H)     Thrombocytopenia -- suspect related to alcohol use     S/P craniotomy     Subdural hematoma (H)     Benign essential hypertension     Malabsorption of iron     Sepsis (H)     Past Surgical History:   Procedure Laterality Date     AMPUTATE LEG BELOW KNEE Left 04/2014    related to gangrene, started as foot ulcer related to neuropathy     AMPUTATE LEG BELOW KNEE Left 12/4/2017    Procedure: AMPUTATE LEG BELOW KNEE;  Exploration of Left Leg Below Knee Amputation Stump with Ligation of Bleeders.;  Surgeon: Leandro Arreola MD;  Location:  OR     APPENDECTOMY       APPLY WOUND VAC Left 12/6/2017    Procedure: APPLY WOUND VAC;;  Surgeon: Saima Howard MD;  Location:  SD     ARTHROPLASTY KNEE Right 9/19/2014    Procedure: ARTHROPLASTY KNEE;  Surgeon: Saima Howard MD;  Location:  OR     CARDIAC SURGERY      CABG     CHOLECYSTECTOMY       COLONOSCOPY       COMBINED CYSTOSCOPY, RETROGRADES, EXCHANGE STENT URETER(S) Left 7/24/2018    Procedure: COMBINED CYSTOSCOPY, RETROGRADES, EXCHANGE STENT URETER(S);  CYSTOSCOPY, BILATERAL RETROGRADES, BILATERAL URETERAL STENT EXCHANGE ;  Surgeon: Matt Cervantes MD;  Location:  OR     CRANIOTOMY Right 4/7/2018    Procedure: CRANIOTOMY;  Right Craniotomy For Subdural Hematoma;  Surgeon: Jono Issa MD;  Location:  OR     CYSTOSCOPY, DILATE URETHRA, COMBINED N/A 10/12/2016    Procedure: COMBINED CYSTOSCOPY, DILATE URETHRA;  Surgeon: Dimitry Bello MD;  Location:  OR     CYSTOSCOPY, REMOVE STENT(S), COMBINED Right 7/24/2018    Procedure: COMBINED  CYSTOSCOPY, REMOVE STENT(S);;  Surgeon: Jose Hunter MD;  Location:  OR     ENDOSCOPIC STRIPPING VEIN(S)       esophagogastroduodenoscopy       EYE SURGERY      cataracts     GENITOURINARY SURGERY      Prostate Seen Implants     HERNIA REPAIR      Umbilical     INCISION AND DRAINAGE LOWER EXTREMITY, COMBINED Left 2017    Procedure: COMBINED INCISION AND DRAINAGE LOWER EXTREMITY;  INCISION AND DRAINAGE OF LEFT BELOW KNEE AMPUTATION ABSCESS, WOUND VAC PLACEMENT;  Surgeon: Saima Howard MD;  Location:  OR     IR IVC FILTER PLACEMENT       IRRIGATION AND DEBRIDEMENT LOWER EXTREMITY, COMBINED Left 2017    Procedure: COMBINED IRRIGATION AND DEBRIDEMENT LOWER EXTREMITY;  IRRIGATION AND DEBRIDEMENT OF LEFT BELOW KNEE AMPUTATION SITE WITH WOUND VAC REPLACEMENT;  Surgeon: Saima Howard MD;  Location:  SD     IRRIGATION AND DEBRIDEMENT LOWER EXTREMITY, COMBINED Left 2017    Procedure: COMBINED IRRIGATION AND DEBRIDEMENT LOWER EXTREMITY;  IRRIGATION AND DEBRIDEMENT OF LEFT BELOW THE KNEE AMPUTATION AND SHORTENING OF THE TIBIA WITH WOUND CLOSURE;  Surgeon: Saima Howard MD;  Location:  OR     LASER HOLMIUM LITHOTRIPSY URETER(S), INSERT STENT, COMBINED Bilateral 2018    Procedure: COMBINED CYSTOSCOPY, URETEROSCOPY, LASER HOLMIUM LITHOTRIPSY URETER(S), INSERT STENT;  CYSTOSCOPY, BILATERAL URETEROSCOPY,  HOLMIUM LASER LITHOTRIPSY, BILATERAL STENT PLACEMENT, STONE BASKETING;  Surgeon: Jose Hunter MD;  Location:  OR     ORTHOPEDIC SURGERY      (R) knee scope     ORTHOPEDIC SURGERY      total hip      ORTHOPEDIC SURGERY      elbow surgery       Social History   Substance Use Topics     Smoking status: Never Smoker     Smokeless tobacco: Never Used     Alcohol use Yes      Comment: quit 10/2015; now 5-6 Vodkas/night     Family History   Problem Relation Age of Onset     Lung Cancer Mother      Heart Failure Father       age 87         Current Outpatient Prescriptions  "  Medication Sig Dispense Refill     aspirin 81 MG tablet Take 81 mg by mouth daily       carvedilol (COREG) 3.125 MG tablet Take 1 tablet (3.125 mg) by mouth 2 times daily (with meals) 180 tablet 1     fluticasone (FLONASE) 50 MCG/ACT spray Spray 1 spray into both nostrils 2 times daily        gabapentin (NEURONTIN) 600 MG tablet Take 1 tablet (600 mg) by mouth 3 times daily 270 tablet 3     phenazopyridine (PYRIDIUM) 100 MG tablet Take 1 tablet (100 mg) by mouth 3 times daily as needed for urinary tract discomfort 90 tablet 3     rivaroxaban ANTICOAGULANT (XARELTO) 10 MG TABS tablet Take 1 tablet (10 mg) by mouth daily (with dinner)       sulfamethoxazole-trimethoprim (BACTRIM DS/SEPTRA DS) 800-160 MG per tablet Take 1 tablet by mouth 2 times daily for 21 days 42 tablet 0     torsemide (DEMADEX) 20 MG tablet Take 20 mg by mouth daily       Allergies   Allergen Reactions     Ciprofloxacin Itching     Severe itching \"like ants were crawling\"     Hmg-Coa-R Inhibitors Other (See Comments)     Rhabdomyolysis occurred within a couple days       Reviewed and updated as needed this visit by clinical staff       Reviewed and updated as needed this visit by Provider         ROS:  Constitutional, HEENT, cardiovascular, pulmonary, gi and gu systems are negative, except as otherwise noted.    OBJECTIVE:     /69 (BP Location: Right arm, Cuff Size: Adult Large)  Pulse 57  Temp 97.2  F (36.2  C) (Tympanic)  Ht 6' 2\" (1.88 m)  Wt 226 lb (102.5 kg)  SpO2 96%  BMI 29.02 kg/m2  Body mass index is 29.02 kg/(m^2).  GENERAL: healthy, alert and no distress  HENT: ear canals and TM's normal, nose and mouth without ulcers or lesions  RESP: lungs clear to auscultation - no rales, rhonchi or wheezes  CV: Heart with regular rate and rhythm.   ABDOMEN: Slight distention, not tender, no masses, bowel sounds present   MS: Left leg BKA, right leg with trace edema   SKIN: Multiple superficial abrasions, dressings changed by myself and " MA, no evidence of inection  NEURO: Normal strength and tone, mentation intact and speech normal  PSYCH: mentation appears normal, affect normal/bright    Diagnostic Test Results:  Results for orders placed or performed during the hospital encounter of 07/23/18   XR Chest 2 Views    Narrative    XR CHEST 2 VW 7/23/2018 2:14 PM    HISTORY: fever, cough, pneumonia;       Impression    IMPRESSION: Postoperative change. Shallow inspiration with some mild  atelectasis in the lung bases. Chest otherwise negative.    MIKO ALSTON MD   CT Abdomen Pelvis w/o Contrast    Narrative    CT ABDOMEN AND PELVIS WITHOUT CONTRAST 7/24/2018 3:41 PM     HISTORY: Left ureteral stones.     COMPARISON: May 22, 2018    TECHNIQUE: Axial CT images of the abdomen and pelvis from the  diaphragm to the symphysis pubis were acquired without IV contrast.  Radiation dose for this scan was reduced using automated exposure  control, adjustment of the mA and/or kV according to patient size, or  iterative reconstruction technique.    FINDINGS: 3 mm stone adjacent to the stent in the region of the  crossing of the iliac vasculature in the left ureter. Stone versus  cluster of stones in the inferior pole of the left kidney measuring  1.8 cm. Bilateral ureteral stents in place. No right-sided stones. No  hydronephrosis. There is mild bilateral perinephric fat stranding.  Kidneys are normal in size and configuration. Small amount of free  fluid. No free air. There is moderate diverticulosis without evidence  for diverticulitis. There are no dilated loops of small intestine or  large bowel to suggest ileus or obstruction. There are extensive  atherosclerotic changes of the visualized aorta and its branches.  There is no evidence of aortic aneurysm. No splenomegaly. No definite  adrenal nodules. Pancreas grossly unremarkable. The remainder of the  visualized abdomen is unremarkable on this noncontrast scan. Survey of  the visualized bony structures  demonstrates no destructive bony  lesions. Small right and trace left pleural effusion with associated  atelectasis and/or infiltrate. There are extensive coronary vascular  calcifications and/or stents consistent with coronary artery disease.      Impression    IMPRESSION:   1. 3 mm stone adjacent to the stent approximately at the level of the  crossing of the iliac vasculature on the left.  2. Stone or cluster of stones in the inferior pole of the left kidney  measuring 1.8 cm.    CARMEN SERRATO MD   XR Surgery VIK L/T 5 Min Fluoro w Stills    Narrative    This exam was marked as non-reportable because it will not be read by a   radiologist or a [place] non-radiologist provider.             CBC with platelets differential   Result Value Ref Range    WBC 7.1 4.0 - 11.0 10e9/L    RBC Count 3.78 (L) 4.4 - 5.9 10e12/L    Hemoglobin 10.9 (L) 13.3 - 17.7 g/dL    Hematocrit 34.0 (L) 40.0 - 53.0 %    MCV 90 78 - 100 fl    MCH 28.8 26.5 - 33.0 pg    MCHC 32.1 31.5 - 36.5 g/dL    RDW 22.8 (H) 10.0 - 15.0 %    Platelet Count 82 (L) 150 - 450 10e9/L    Diff Method Automated Method     % Neutrophils 86.8 %    % Lymphocytes 3.9 %    % Monocytes 8.8 %    % Eosinophils 0.0 %    % Basophils 0.4 %    % Immature Granulocytes 0.1 %    Nucleated RBCs 0 0 /100    Absolute Neutrophil 6.2 1.6 - 8.3 10e9/L    Absolute Lymphocytes 0.3 (L) 0.8 - 5.3 10e9/L    Absolute Monocytes 0.6 0.0 - 1.3 10e9/L    Absolute Eosinophils 0.0 0.0 - 0.7 10e9/L    Absolute Basophils 0.0 0.0 - 0.2 10e9/L    Abs Immature Granulocytes 0.0 0 - 0.4 10e9/L    Absolute Nucleated RBC 0.0    Basic metabolic panel   Result Value Ref Range    Sodium 135 133 - 144 mmol/L    Potassium 4.3 3.4 - 5.3 mmol/L    Chloride 103 94 - 109 mmol/L    Carbon Dioxide 24 20 - 32 mmol/L    Anion Gap 8 3 - 14 mmol/L    Glucose 104 (H) 70 - 99 mg/dL    Urea Nitrogen 31 (H) 7 - 30 mg/dL    Creatinine 1.91 (H) 0.66 - 1.25 mg/dL    GFR Estimate 34 (L) >60 mL/min/1.7m2    GFR Estimate If  Black 42 (L) >60 mL/min/1.7m2    Calcium 8.1 (L) 8.5 - 10.1 mg/dL   UA with Microscopic   Result Value Ref Range    Color Urine Yellow     Appearance Urine Cloudy     Glucose Urine Negative NEG^Negative mg/dL    Bilirubin Urine Negative NEG^Negative    Ketones Urine Negative NEG^Negative mg/dL    Specific Gravity Urine 1.015 1.003 - 1.035    Blood Urine Moderate (A) NEG^Negative    pH Urine 6.0 5.0 - 7.0 pH    Protein Albumin Urine 100 (A) NEG^Negative mg/dL    Urobilinogen mg/dL 2.0 0.0 - 2.0 mg/dL    Nitrite Urine Negative NEG^Negative    Leukocyte Esterase Urine Large (A) NEG^Negative    Source Midstream Urine     WBC Urine >182 (H) 0 - 5 /HPF    RBC Urine >182 (H) 0 - 2 /HPF    WBC Clumps Present (A) NEG^Negative /HPF    Bacteria Urine Many (A) NEG^Negative /HPF    Squamous Epithelial /HPF Urine 2 (H) 0 - 1 /HPF    Transitional Epi <1 0 - 1 /HPF    Mucous Urine Present (A) NEG^Negative /LPF   Lactic acid   Result Value Ref Range    Lactic Acid 2.9 (H) 0.4 - 2.0 mmol/L   Lactic acid   Result Value Ref Range    Lactic Acid 1.8 0.7 - 2.0 mmol/L   Comprehensive metabolic panel   Result Value Ref Range    Sodium 135 133 - 144 mmol/L    Potassium 4.1 3.4 - 5.3 mmol/L    Chloride 106 94 - 109 mmol/L    Carbon Dioxide 18 (L) 20 - 32 mmol/L    Anion Gap 11 3 - 14 mmol/L    Glucose 86 70 - 99 mg/dL    Urea Nitrogen 34 (H) 7 - 30 mg/dL    Creatinine 1.67 (H) 0.66 - 1.25 mg/dL    GFR Estimate 40 (L) >60 mL/min/1.7m2    GFR Estimate If Black 49 (L) >60 mL/min/1.7m2    Calcium 7.3 (L) 8.5 - 10.1 mg/dL    Bilirubin Total 1.3 0.2 - 1.3 mg/dL    Albumin 2.6 (L) 3.4 - 5.0 g/dL    Protein Total 6.1 (L) 6.8 - 8.8 g/dL    Alkaline Phosphatase 64 40 - 150 U/L    ALT 17 0 - 70 U/L    AST 30 0 - 45 U/L   CBC with platelets differential   Result Value Ref Range    WBC 5.5 4.0 - 11.0 10e9/L    RBC Count 3.44 (L) 4.4 - 5.9 10e12/L    Hemoglobin 10.0 (L) 13.3 - 17.7 g/dL    Hematocrit 30.4 (L) 40.0 - 53.0 %    MCV 88 78 - 100 fl    MCH  29.1 26.5 - 33.0 pg    MCHC 32.9 31.5 - 36.5 g/dL    RDW 22.8 (H) 10.0 - 15.0 %    Platelet Count 66 (L) 150 - 450 10e9/L    Diff Method Automated Method     % Neutrophils 76.0 %    % Lymphocytes 6.6 %    % Monocytes 16.4 %    % Eosinophils 0.4 %    % Basophils 0.4 %    % Immature Granulocytes 0.2 %    Nucleated RBCs 0 0 /100    Absolute Neutrophil 4.2 1.6 - 8.3 10e9/L    Absolute Lymphocytes 0.4 (L) 0.8 - 5.3 10e9/L    Absolute Monocytes 0.9 0.0 - 1.3 10e9/L    Absolute Eosinophils 0.0 0.0 - 0.7 10e9/L    Absolute Basophils 0.0 0.0 - 0.2 10e9/L    Abs Immature Granulocytes 0.0 0 - 0.4 10e9/L    Absolute Nucleated RBC 0.0    CBC with platelets differential   Result Value Ref Range    WBC 3.9 (L) 4.0 - 11.0 10e9/L    RBC Count 3.54 (L) 4.4 - 5.9 10e12/L    Hemoglobin 10.3 (L) 13.3 - 17.7 g/dL    Hematocrit 31.6 (L) 40.0 - 53.0 %    MCV 89 78 - 100 fl    MCH 29.1 26.5 - 33.0 pg    MCHC 32.6 31.5 - 36.5 g/dL    RDW 22.4 (H) 10.0 - 15.0 %    Platelet Count 76 (L) 150 - 450 10e9/L    Diff Method Automated Method     % Neutrophils 83.7 %    % Lymphocytes 8.1 %    % Monocytes 7.9 %    % Eosinophils 0.0 %    % Basophils 0.0 %    % Immature Granulocytes 0.3 %    Nucleated RBCs 0 0 /100    Absolute Neutrophil 3.3 1.6 - 8.3 10e9/L    Absolute Lymphocytes 0.3 (L) 0.8 - 5.3 10e9/L    Absolute Monocytes 0.3 0.0 - 1.3 10e9/L    Absolute Eosinophils 0.0 0.0 - 0.7 10e9/L    Absolute Basophils 0.0 0.0 - 0.2 10e9/L    Abs Immature Granulocytes 0.0 0 - 0.4 10e9/L    Absolute Nucleated RBC 0.0    Basic metabolic panel   Result Value Ref Range    Sodium 137 133 - 144 mmol/L    Potassium 4.2 3.4 - 5.3 mmol/L    Chloride 107 94 - 109 mmol/L    Carbon Dioxide 21 20 - 32 mmol/L    Anion Gap 9 3 - 14 mmol/L    Glucose 149 (H) 70 - 99 mg/dL    Urea Nitrogen 31 (H) 7 - 30 mg/dL    Creatinine 1.40 (H) 0.66 - 1.25 mg/dL    GFR Estimate 49 (L) >60 mL/min/1.7m2    GFR Estimate If Black 60 (L) >60 mL/min/1.7m2    Calcium 7.2 (L) 8.5 - 10.1 mg/dL    Glucose by meter   Result Value Ref Range    Glucose 153 (H) 70 - 99 mg/dL   Glucose by meter   Result Value Ref Range    Glucose 169 (H) 70 - 99 mg/dL   EKG 12-lead, tracing only   Result Value Ref Range    Interpretation ECG Click View Image link to view waveform and result    Urology IP Consult: urosepsis, h/o stents, already discussed with Dr. Howe; Consultant may enter orders: Yes; Patient to be seen: Routine - within 24 hours; Requested Clinic[facility]Group: [facility]    Lai Horton PA-C     7/23/2018  5:11 PM  [facility]    Urology Consultation     Date of Admission:  7/23/2018    Assessment & Plan   Antonio Ramirez is a 73 year old male who was admitted on   7/23/2018. I was asked to see the patient for urosepsis with   bilateral ureteral stents.    Vitals wnl    Cr slightly elevated at 1.9 when previously was 1.2    Urine positive and with stents in place must assume infection   has spread to kidneys    Responded well to fluid resuscitation in ER with lactate   dropping from 2.9 to 1.8.     Patient is septic, but not in shock    Patient received pip-tazo and ceftriaxone in ED    Plan:     Admit to medicine    Cont Abx    Plan on cystoscopy, right retrograde, stent removal, and left   stent exchange tomorrow in OR    NPO after midnight    Will postpone left stone removal until infection has resolved    *Discussed this case in detail with Dr. Hunter who approved of   the plan.      Lai Donis PA-C 7/23/2018 4:36 PM  [facility]  Mon-Fri, 7am - 5pm  Text page [phone]  Office: [phone]      Code Status    Prior    Reason for Consult   Reason for consult: I was asked by Dr. Chowdary to evaluate this   patient for urosepsis.    Primary Care Physician   Main [facility]    Chief Complaint   Fever, chills    History is obtained from the patient    History of Present Illness   Antonio Ramirez is a 73 year old male who presents with fever and   chills for  the last week. He was scheduled for left ureteroscopy   and stone removal but had to cancel his operation because he had   been feeling feverish and chills over the weekend. Had fever and   chills In May he had bilateral stones and was septic with   bilateral perc neph tubes placed. Both were internalized to   stents, and Dr. Hunter performed ureteroscopy to remove the   stones on the right side. The patient currently has stones on   left side and stents bilaterally. Has not had anything to eat   since last night.    Past Medical History   I have reviewed this patient's medical history and updated it   with pertinent information if needed.   Past Medical History:   Diagnosis Date     Alcoholism (H)      Amputated left leg (H)      Anemia      Anticardiolipin antibody syndrome (H)      Antiplatelet or antithrombotic long-term use      Aortic stenosis      Arthritis      Balance problem      Cardiomyopathy (H)      Coagulation disorder (H)     cardiolipinantibody syndrome     Coronary artery disease      Gastro-oesophageal reflux disease      Heart attack 2007    apparently arrested, cardiac X5     Hiatal hernia      Hypercholesterolemia      Hypertension      Left foot drop      Liver disease     alcoholic liver disease, alcoholic cirrohsosis, portal   hypertension     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)     ?When diagnosed, patient not aware of this     Moderate mitral regurgitation      Monoclonal gammopathy      Neuropathy      Noninfectious ileitis     diverticulitis of colon     PE (pulmonary embolism) 2006     Prostate cancer (H) 2000    Treated with radiation (seed implants in 2000) -- no problems   since     Renal disease     kidney stones     Syncope      Thoracic aortic aneurysm, without rupture      Thrombocytopenia (H)      Thrombosis of leg      Urethral stricture        Past Surgical History   I have reviewed this patient's surgical history and updated it   with pertinent information if  needed.  Past Surgical History:   Procedure Laterality Date     AMPUTATE LEG BELOW KNEE Left 04/2014    related to gangrene, started as foot ulcer related to neuropathy       AMPUTATE LEG BELOW KNEE Left 12/4/2017    Procedure: AMPUTATE LEG BELOW KNEE;  Exploration of Left Leg   Below Knee Amputation Stump with Ligation of Bleeders.;  Surgeon:   Leandro Arreola MD;  Location:  OR     APPENDECTOMY       APPLY WOUND VAC Left 12/6/2017    Procedure: APPLY WOUND VAC;;  Surgeon: Saima Howard MD;    Location:  SD     ARTHROPLASTY KNEE Right 9/19/2014    Procedure: ARTHROPLASTY KNEE;  Surgeon: Saima Howard MD;    Location:  OR     CARDIAC SURGERY      CABG     CHOLECYSTECTOMY       COLONOSCOPY       CRANIOTOMY Right 4/7/2018    Procedure: CRANIOTOMY;  Right Craniotomy For Subdural Hematoma;    Surgeon: Jono Issa MD;  Location:  OR     CYSTOSCOPY, DILATE URETHRA, COMBINED N/A 10/12/2016    Procedure: COMBINED CYSTOSCOPY, DILATE URETHRA;  Surgeon:   Dimitry Bello MD;  Location:  OR     ENDOSCOPIC STRIPPING VEIN(S)       esophagogastroduodenoscopy       EYE SURGERY      cataracts     GENITOURINARY SURGERY      Prostate Seen Implants     HERNIA REPAIR      Umbilical     INCISION AND DRAINAGE LOWER EXTREMITY, COMBINED Left 12/1/2017    Procedure: COMBINED INCISION AND DRAINAGE LOWER EXTREMITY;    INCISION AND DRAINAGE OF LEFT BELOW KNEE AMPUTATION ABSCESS,   WOUND VAC PLACEMENT;  Surgeon: Saima Howard MD;  Location:    OR     IR IVC FILTER PLACEMENT       IRRIGATION AND DEBRIDEMENT LOWER EXTREMITY, COMBINED Left   12/6/2017    Procedure: COMBINED IRRIGATION AND DEBRIDEMENT LOWER EXTREMITY;    IRRIGATION AND DEBRIDEMENT OF LEFT BELOW KNEE AMPUTATION SITE   WITH WOUND VAC REPLACEMENT;  Surgeon: Saima Howard MD;    Location:  SD     IRRIGATION AND DEBRIDEMENT LOWER EXTREMITY, COMBINED Left   12/8/2017    Procedure: COMBINED IRRIGATION AND DEBRIDEMENT LOWER EXTREMITY;     IRRIGATION AND DEBRIDEMENT OF LEFT BELOW THE KNEE AMPUTATION AND   SHORTENING OF THE TIBIA WITH WOUND CLOSURE;  Surgeon: Saima Howard MD;  Location:  OR     LASER HOLMIUM LITHOTRIPSY URETER(S), INSERT STENT, COMBINED   Bilateral 6/28/2018    Procedure: COMBINED CYSTOSCOPY, URETEROSCOPY, LASER HOLMIUM   LITHOTRIPSY URETER(S), INSERT STENT;  CYSTOSCOPY, BILATERAL   URETEROSCOPY,  HOLMIUM LASER LITHOTRIPSY, BILATERAL STENT   PLACEMENT, STONE BASKETING;  Surgeon: Jose Hunter MD;    Location:  OR     ORTHOPEDIC SURGERY      (R) knee scope     ORTHOPEDIC SURGERY      total hip      ORTHOPEDIC SURGERY      elbow surgery       Prior to Admission Medications   Prior to Admission Medications   Prescriptions Last Dose Informant Patient Reported? Taking?   HYDROcodone-acetaminophen (NORCO) 5-325 MG per tablet   No No   Sig: Take 1-2 tablets by mouth every 4 hours as needed for severe   pain (Moderate to Severe Pain)   aspirin 81 MG tablet   Yes No   Sig: Take 81 mg by mouth daily   carvedilol (COREG) 3.125 MG tablet  Self Yes No   Sig: Take 1 tablet (3.125 mg) by mouth 2 times daily (with meals)     fluticasone (FLONASE) 50 MCG/ACT spray  Self Yes No   Sig: Spray 2 sprays into both nostrils daily   gabapentin (NEURONTIN) 600 MG tablet   No No   Sig: Take 1 tablet (600 mg) by mouth 3 times daily   oxyCODONE IR (ROXICODONE) 5 MG tablet   No No   Sig: Take 1 tablet (5 mg) by mouth every 6 hours as needed for   other (pain control or improvement in physical function. Hold   dose for analgesic side effects.)   phenazopyridine (PYRIDIUM) 100 MG tablet   No No   Sig: Take 1 tablet (100 mg) by mouth 3 times daily as needed for   urinary tract discomfort   rivaroxaban ANTICOAGULANT (XARELTO) 10 MG TABS tablet   Yes No   Sig: Take 1 tablet (10 mg) by mouth daily (with dinner)   torsemide (DEMADEX) 20 MG tablet  Self Yes No   Sig: Take 20 mg by mouth daily      Facility-Administered Medications: None     Allergies  "  Allergies   Allergen Reactions     Ciprofloxacin Itching     Severe itching \"like ants were crawling\"     Hmg-Coa-R Inhibitors Other (See Comments)     Rhabdomyolysis occurred within a couple days       Social History   I have reviewed this patient's social history and updated it with   pertinent information if needed. Antonio Ramirez  reports that he   has never smoked. He has never used smokeless tobacco. He reports   that he drinks alcohol. He reports that he does not use illicit   drugs.    Family History   I have reviewed this patient's family history and updated it with   pertinent information if needed.   Family History   Problem Relation Age of Onset     Lung Cancer Mother      Heart Failure Father       age 87       Review of Systems   The 10 point Review of Systems is negative other than noted in   the HPI or here.    Physical Exam   Temp: 100.5  F (38.1  C) Temp src: Oral BP: 103/48   Heart Rate:   70 Resp: 16 SpO2: 96 %      Vital Signs with Ranges  Temp:  [99.3  F (37.4  C)-101.1  F (38.4  C)] 100.5  F (38.1  C)  Heart Rate:  [70] 70  Resp:  [16] 16  BP: (103-143)/(48-86) 103/48  SpO2:  [94 %-98 %] 96 %  209 lbs 0 oz    General: Patient reclined in bed resting comfortably  Head: Normocephalic, atraumatic  Eyes: No icterus  Neck: Symmetric  Respiratory: Breathing unlabored  Cardiac: Skin well-perfused  Skin: no visible rashes   Extremities: No LE edema, no calf pain  Neurologic: No focal deficits  Neuropsychiatric: A&O x 3, responding appropriately        Data   Results for orders placed or performed during the hospital   encounter of 18 (from the past 24 hour(s))   CBC with platelets differential   Result Value Ref Range    WBC 7.1 4.0 - 11.0 10e9/L    RBC Count 3.78 (L) 4.4 - 5.9 10e12/L    Hemoglobin 10.9 (L) 13.3 - 17.7 g/dL    Hematocrit 34.0 (L) 40.0 - 53.0 %    MCV 90 78 - 100 fl    MCH 28.8 26.5 - 33.0 pg    MCHC 32.1 31.5 - 36.5 g/dL    RDW 22.8 (H) 10.0 - 15.0 %    Platelet Count 82 " (L) 150 - 450 10e9/L    Diff Method Automated Method     % Neutrophils 86.8 %    % Lymphocytes 3.9 %    % Monocytes 8.8 %    % Eosinophils 0.0 %    % Basophils 0.4 %    % Immature Granulocytes 0.1 %    Nucleated RBCs 0 0 /100    Absolute Neutrophil 6.2 1.6 - 8.3 10e9/L    Absolute Lymphocytes 0.3 (L) 0.8 - 5.3 10e9/L    Absolute Monocytes 0.6 0.0 - 1.3 10e9/L    Absolute Eosinophils 0.0 0.0 - 0.7 10e9/L    Absolute Basophils 0.0 0.0 - 0.2 10e9/L    Abs Immature Granulocytes 0.0 0 - 0.4 10e9/L    Absolute Nucleated RBC 0.0    Basic metabolic panel   Result Value Ref Range    Sodium 135 133 - 144 mmol/L    Potassium 4.3 3.4 - 5.3 mmol/L    Chloride 103 94 - 109 mmol/L    Carbon Dioxide 24 20 - 32 mmol/L    Anion Gap 8 3 - 14 mmol/L    Glucose 104 (H) 70 - 99 mg/dL    Urea Nitrogen 31 (H) 7 - 30 mg/dL    Creatinine 1.91 (H) 0.66 - 1.25 mg/dL    GFR Estimate 34 (L) >60 mL/min/1.7m2    GFR Estimate If Black 42 (L) >60 mL/min/1.7m2    Calcium 8.1 (L) 8.5 - 10.1 mg/dL   Lactic acid   Result Value Ref Range    Lactic Acid 2.9 (H) 0.4 - 2.0 mmol/L   XR Chest 2 Views    Narrative    XR CHEST 2 VW 7/23/2018 2:14 PM    HISTORY: fever, cough, pneumonia;       Impression    IMPRESSION: Postoperative change. Shallow inspiration with some   mild  atelectasis in the lung bases. Chest otherwise negative.    MIKO ALSTON MD   Lactic acid   Result Value Ref Range    Lactic Acid 1.8 0.7 - 2.0 mmol/L   UA with Microscopic   Result Value Ref Range    Color Urine Yellow     Appearance Urine Cloudy     Glucose Urine Negative NEG^Negative mg/dL    Bilirubin Urine Negative NEG^Negative    Ketones Urine Negative NEG^Negative mg/dL    Specific Gravity Urine 1.015 1.003 - 1.035    Blood Urine Moderate (A) NEG^Negative    pH Urine 6.0 5.0 - 7.0 pH    Protein Albumin Urine 100 (A) NEG^Negative mg/dL    Urobilinogen mg/dL 2.0 0.0 - 2.0 mg/dL    Nitrite Urine Negative NEG^Negative    Leukocyte Esterase Urine Large (A) NEG^Negative    Source  Midstream Urine     WBC Urine >182 (H) 0 - 5 /HPF    RBC Urine >182 (H) 0 - 2 /HPF    WBC Clumps Present (A) NEG^Negative /HPF    Bacteria Urine Many (A) NEG^Negative /HPF    Squamous Epithelial /HPF Urine 2 (H) 0 - 1 /HPF    Transitional Epi <1 0 - 1 /HPF    Mucous Urine Present (A) NEG^Negative /LPF                Care Coordinator IP Consult    Narrative    Atiya Aleman RN     7/25/2018  3:51 PM  Care Transition Initial Assessment - RN        Met with: Patient and his Spouse Helena SÁNCHEZ   Active Problems:    Sepsis (H)       Cognitive Status: awake, alert and oriented.        Contact information and PCP information verified: Yes  Lives With: spouse  Living Arrangements: apartment     Insurance concerns: No Insurance issues identified  ASSESSMENT  Patient currently receives the following services:  NA        Identified issues/concerns regarding health management:No issues   are identified at present.  Pt really wanted to be discharged.    Pt had some delirium during night and does drink alcohol daily.    This has cleared.  Pt is a retired  and continues   to be busy.  PT has recommended outpt PT.  Pt has a personal    that is a Physical Therapist that he sees twice a week at   baseline, so he may decline additional PT services.       PLAN  Financial costs for the patient include:NA  Patient/family is agreeable to the plan?  Yes: home today on oral   antibiotics       Patient anticipates needs for home equipment: No  Plan/Disposition: Home     Appointments:   PCP 7/30/18,   Urology Surgery 8/14/18      Care  (CTS) will continue to follow as   needed.           ABO/Rh type and screen   Result Value Ref Range    ABO O     RH(D) Pos     Antibody Screen Neg     Test Valid Only At [facility]        Specimen Expires 07/27/2018    Blood culture   Result Value Ref Range    Specimen Description Blood Right Arm     Special Requests Aerobic and anaerobic bottles  received     Culture Micro (A)      Cultured on the 1st day of incubation:  Klebsiella pneumoniae      Culture Micro       Critical Value/Significant Value, preliminary result only, called to and read back by  Dusty Meier RN on 7.24.18 at 0942. bw      Culture Micro       (Note)  NEGATIVE for the following organisms and resistance markers:  Acinetobacter sp., Citrobacter sp., Enterobacter sp., Proteus sp., E.  coli, K. pneumoniae/oxytoca, P. aeruginosa, CTX-M, KPC, NDM, VIM, IMP  and OXA by [employer] multiplex nucleic acid test. Final identification  and antimicrobial susceptibility testing will be verified by standard  methods.    Critical Value/Significant Value called to and read back by DUSTY MEIER RN 1157 7.24.18 NDP         Susceptibility    Klebsiella pneumoniae - GAYLE     AMIKACIN <=2 Sensitive ug/mL     AMPICILLIN*  Resistant ug/mL      * Intrinsically Resistant     AMPICILLIN/SULBACTAM 4 Sensitive ug/mL     CEFEPIME <=1 Sensitive ug/mL     CEFTAZIDIME <=1 Sensitive ug/mL     CEFTRIAXONE <=1 Sensitive ug/mL     CIPROFLOXACIN <=0.25 Sensitive ug/mL     GENTAMICIN <=1 Sensitive ug/mL     LEVOFLOXACIN <=0.12 Sensitive ug/mL     Piperacillin/Tazo <=4 Sensitive ug/mL     TOBRAMYCIN <=1 Sensitive ug/mL     Trimethoprim/Sulfa <=1/19 Sensitive ug/mL     MEROPENEM <=0.25 Sensitive ug/mL   Blood culture   Result Value Ref Range    Specimen Description Blood Right Arm     Special Requests Aerobic and anaerobic bottles received     Culture Micro (A)      Cultured on the 2nd day of incubation:  Klebsiella pneumoniae      Culture Micro       Critical Value/Significant Value, preliminary result only, called to and read back by  Janet Donis RN on SH66 at 0900 7/25/18 SRQ      Culture Micro Susceptibility testing done on previous specimen    Urine Culture   Result Value Ref Range    Specimen Description Midstream Urine     Special Requests Specimen received in preservative     Culture Micro >100,000  colonies/mL  Klebsiella pneumoniae   (A)        Susceptibility    Klebsiella pneumoniae - GAYLE     AMPICILLIN*  Resistant ug/mL      * Intrinsically Resistant     CEFAZOLIN* <=4 Sensitive ug/mL      * Cefazolin GAYLE breakpoints are for the treatment of uncomplicated urinary tract infections.  For the treatment of systemic infections, please contact the laboratory for additional testing.     CEFOXITIN <=4 Sensitive ug/mL     CEFTAZIDIME <=1 Sensitive ug/mL     CEFTRIAXONE <=1 Sensitive ug/mL     CIPROFLOXACIN <=0.25 Sensitive ug/mL     GENTAMICIN <=1 Sensitive ug/mL     LEVOFLOXACIN <=0.12 Sensitive ug/mL     NITROFURANTOIN 64 Intermediate ug/mL     TOBRAMYCIN <=1 Sensitive ug/mL     Trimethoprim/Sulfa <=1/19 Sensitive ug/mL     AMPICILLIN/SULBACTAM 4 Sensitive ug/mL     Piperacillin/Tazo <=4 Sensitive ug/mL     CEFEPIME <=1 Sensitive ug/mL   Blood culture   Result Value Ref Range    Specimen Description Blood Left Arm     Special Requests Aerobic and anaerobic bottles received     Culture Micro (A)      Cultured on the 1st day of incubation:  Staphylococcus hominis  This isolate is presumed to be clindamycin resistant based on detection of inducible   clindamycin resistance. Erythromycin and clindamycin are resistant, therefore, they are   not recommended for use.      Culture Micro       Critical Value/Significant Value, preliminary result only, called to and read back by  Ifrah Enamorado RN on SH66 at 1131 7/25/18 [facility]      Culture Micro       (Note)  POSITIVE for Staphylococci other than S.aureus, S.epidermidis and  S.lugdunensis, by [employer] multiplex nucleic acid test.  Coagulase-negative staphylococci are the most common venipuncture or  collection associated skin CONTAMINANTS grown in blood cultures.  Final identification and antimicrobial susceptibility testing will be  verified by standard methods.    Specimen tested with [employer] multiplex, gram-positive blood culture  nucleic acid test for the following  targets: Staph aureus, Staph  epidermidis, Staph lugdunensis, other Staph species, Enterococcus  faecalis, Enterococcus faecium, Streptococcus species, S. agalactiae,  S. anginosus grp., S. pneumoniae, S. pyogenes, Listeria sp., mecA  (methicillin resistance) and Rad/B (vancomycin resistance).    Critical Value/Significant Value called to and read back by Janet Donis RN, @ 7132 7.25.2018 BL         Susceptibility    Staphylococcus hominis - GAYLE     CIPROFLOXACIN >=8 Resistant ug/mL     CLINDAMYCIN  Resistant ug/mL     ERYTHROMYCIN >=8 Resistant ug/mL     GENTAMICIN 1 Sensitive ug/mL     LEVOFLOXACIN >=8 Resistant ug/mL     OXACILLIN 1 Resistant ug/mL     PENICILLIN >=0.5 Resistant ug/mL     TETRACYCLINE <=1 Sensitive ug/mL     VANCOMYCIN 1 Sensitive ug/mL       ASSESSMENT/PLAN:       1. Other ascites  If ascites present, then return for ultrasound guided thoracentesis after holding xarelto for 2 days   - US ABDOMEN LIMITED; Future    2. Diarrhea, unspecified type  Check for c. Diff  Probiotics recommended   - Clostridium difficile Toxin B PCR; Future    3. Alcoholic cirrhosis of liver without ascites (H)      4. Nephrolithiasis  Continue follow up with urology as directed  Complete course of bactrim as directed    5. Chronic vasomotor rhinitis  Try below   - ipratropium (ATROVENT) 0.06 % spray; Spray 2 sprays into both nostrils 4 times daily as needed for rhinitis  Dispense: 3 Box; Refill: 3    FUTURE APPOINTMENTS:       - Follow-up visit pending diagnostic tests and symptoms     Main Hardy MD  [facility]

## 2018-08-14 ENCOUNTER — ANESTHESIA EVENT (OUTPATIENT)
Dept: SURGERY | Facility: CLINIC | Age: 73
End: 2018-08-14
Payer: MEDICARE

## 2018-08-14 ENCOUNTER — SURGERY (OUTPATIENT)
Age: 73
End: 2018-08-14
Payer: MEDICARE

## 2018-08-14 ENCOUNTER — APPOINTMENT (OUTPATIENT)
Dept: GENERAL RADIOLOGY | Facility: CLINIC | Age: 73
End: 2018-08-14
Attending: UROLOGY
Payer: MEDICARE

## 2018-08-14 ENCOUNTER — HOSPITAL ENCOUNTER (OUTPATIENT)
Facility: CLINIC | Age: 73
Discharge: HOME | End: 2018-08-14
Attending: UROLOGY | Admitting: UROLOGY
Payer: MEDICARE

## 2018-08-14 ENCOUNTER — ANESTHESIA (OUTPATIENT)
Dept: SURGERY | Facility: CLINIC | Age: 73
End: 2018-08-14
Payer: MEDICARE

## 2018-08-14 VITALS
DIASTOLIC BLOOD PRESSURE: 66 MMHG | OXYGEN SATURATION: 94 % | RESPIRATION RATE: 16 BRPM | TEMPERATURE: 97 F | BODY MASS INDEX: 29.92 KG/M2 | WEIGHT: 233 LBS | SYSTOLIC BLOOD PRESSURE: 124 MMHG

## 2018-08-14 DIAGNOSIS — N20.1 URETERAL STONE: Primary | ICD-10-CM

## 2018-08-14 PROCEDURE — 25000566 ZZH SEVOFLURANE, EA 15 MIN: Performed by: UROLOGY

## 2018-08-14 PROCEDURE — 36000056 ZZH SURGERY LEVEL 3 1ST 30 MIN: Performed by: UROLOGY

## 2018-08-14 PROCEDURE — 71000012 ZZH RECOVERY PHASE 1 LEVEL 1 FIRST HR: Performed by: UROLOGY

## 2018-08-14 PROCEDURE — 36000058 ZZH SURGERY LEVEL 3 EA 15 ADDTL MIN: Performed by: UROLOGY

## 2018-08-14 PROCEDURE — 25000128 H RX IP 250 OP 636: Performed by: NURSE ANESTHETIST, CERTIFIED REGISTERED

## 2018-08-14 PROCEDURE — 88300 SURGICAL PATH GROSS: CPT | Mod: 26 | Performed by: UROLOGY

## 2018-08-14 PROCEDURE — C1758 CATHETER, URETERAL: HCPCS | Performed by: UROLOGY

## 2018-08-14 PROCEDURE — 82365 CALCULUS SPECTROSCOPY: CPT | Performed by: UROLOGY

## 2018-08-14 PROCEDURE — 25800025 ZZH RX 258: Performed by: UROLOGY

## 2018-08-14 PROCEDURE — 40000935 ZZH STATISTIC OUTPATIENT (NON-OBS) EVE

## 2018-08-14 PROCEDURE — 40000170 ZZH STATISTIC PRE-PROCEDURE ASSESSMENT II: Performed by: UROLOGY

## 2018-08-14 PROCEDURE — 37000009 ZZH ANESTHESIA TECHNICAL FEE, EACH ADDTL 15 MIN: Performed by: UROLOGY

## 2018-08-14 PROCEDURE — C2617 STENT, NON-COR, TEM W/O DEL: HCPCS | Performed by: UROLOGY

## 2018-08-14 PROCEDURE — 27210794 ZZH OR GENERAL SUPPLY STERILE: Performed by: UROLOGY

## 2018-08-14 PROCEDURE — 88300 SURGICAL PATH GROSS: CPT | Performed by: UROLOGY

## 2018-08-14 PROCEDURE — 71000013 ZZH RECOVERY PHASE 1 LEVEL 1 EA ADDTL HR: Performed by: UROLOGY

## 2018-08-14 PROCEDURE — C1769 GUIDE WIRE: HCPCS | Performed by: UROLOGY

## 2018-08-14 PROCEDURE — C1894 INTRO/SHEATH, NON-LASER: HCPCS | Performed by: UROLOGY

## 2018-08-14 PROCEDURE — 25000125 ZZHC RX 250: Performed by: NURSE ANESTHETIST, CERTIFIED REGISTERED

## 2018-08-14 PROCEDURE — 27210995 ZZH RX 272: Performed by: UROLOGY

## 2018-08-14 PROCEDURE — 25000128 H RX IP 250 OP 636: Performed by: UROLOGY

## 2018-08-14 PROCEDURE — 37000008 ZZH ANESTHESIA TECHNICAL FEE, 1ST 30 MIN: Performed by: UROLOGY

## 2018-08-14 PROCEDURE — 40000277 XR SURGERY CARM FLUORO LESS THAN 5 MIN W STILLS

## 2018-08-14 PROCEDURE — 25000128 H RX IP 250 OP 636: Performed by: ANESTHESIOLOGY

## 2018-08-14 DEVICE — STENT URETERAL CONTOUR SOFT PERCUFLEX 6FRX28CM: Type: IMPLANTABLE DEVICE | Site: URETER | Status: FUNCTIONAL

## 2018-08-14 RX ORDER — PROPOFOL 10 MG/ML
INJECTION, EMULSION INTRAVENOUS PRN
Status: DISCONTINUED | OUTPATIENT
Start: 2018-08-14 | End: 2018-08-14

## 2018-08-14 RX ORDER — PROCHLORPERAZINE MALEATE 5 MG
5 TABLET ORAL EVERY 6 HOURS PRN
Status: DISCONTINUED | OUTPATIENT
Start: 2018-08-14 | End: 2018-08-14 | Stop reason: HOSPADM

## 2018-08-14 RX ORDER — NALOXONE HYDROCHLORIDE 0.4 MG/ML
.1-.4 INJECTION, SOLUTION INTRAMUSCULAR; INTRAVENOUS; SUBCUTANEOUS
Status: DISCONTINUED | OUTPATIENT
Start: 2018-08-14 | End: 2018-08-14

## 2018-08-14 RX ORDER — ONDANSETRON 2 MG/ML
4 INJECTION INTRAMUSCULAR; INTRAVENOUS EVERY 30 MIN PRN
Status: DISCONTINUED | OUTPATIENT
Start: 2018-08-14 | End: 2018-08-14 | Stop reason: HOSPADM

## 2018-08-14 RX ORDER — ONDANSETRON 4 MG/1
4 TABLET, ORALLY DISINTEGRATING ORAL EVERY 6 HOURS PRN
Status: DISCONTINUED | OUTPATIENT
Start: 2018-08-14 | End: 2018-08-14 | Stop reason: HOSPADM

## 2018-08-14 RX ORDER — SODIUM CHLORIDE, SODIUM LACTATE, POTASSIUM CHLORIDE, CALCIUM CHLORIDE 600; 310; 30; 20 MG/100ML; MG/100ML; MG/100ML; MG/100ML
INJECTION, SOLUTION INTRAVENOUS CONTINUOUS
Status: DISCONTINUED | OUTPATIENT
Start: 2018-08-14 | End: 2018-08-14 | Stop reason: HOSPADM

## 2018-08-14 RX ORDER — FENTANYL CITRATE 50 UG/ML
INJECTION, SOLUTION INTRAMUSCULAR; INTRAVENOUS PRN
Status: DISCONTINUED | OUTPATIENT
Start: 2018-08-14 | End: 2018-08-14

## 2018-08-14 RX ORDER — ONDANSETRON 2 MG/ML
4 INJECTION INTRAMUSCULAR; INTRAVENOUS EVERY 6 HOURS PRN
Status: DISCONTINUED | OUTPATIENT
Start: 2018-08-14 | End: 2018-08-14 | Stop reason: HOSPADM

## 2018-08-14 RX ORDER — FENTANYL CITRATE 50 UG/ML
25-50 INJECTION, SOLUTION INTRAMUSCULAR; INTRAVENOUS
Status: DISCONTINUED | OUTPATIENT
Start: 2018-08-14 | End: 2018-08-14 | Stop reason: HOSPADM

## 2018-08-14 RX ORDER — DEXAMETHASONE SODIUM PHOSPHATE 4 MG/ML
INJECTION, SOLUTION INTRA-ARTICULAR; INTRALESIONAL; INTRAMUSCULAR; INTRAVENOUS; SOFT TISSUE PRN
Status: DISCONTINUED | OUTPATIENT
Start: 2018-08-14 | End: 2018-08-14

## 2018-08-14 RX ORDER — LIDOCAINE 40 MG/G
CREAM TOPICAL
Status: DISCONTINUED | OUTPATIENT
Start: 2018-08-14 | End: 2018-08-14 | Stop reason: HOSPADM

## 2018-08-14 RX ORDER — SULFAMETHOXAZOLE/TRIMETHOPRIM 800-160 MG
1 TABLET ORAL 2 TIMES DAILY
Qty: 14 TABLET | Refills: 0 | Status: ON HOLD | OUTPATIENT
Start: 2018-08-15 | End: 2018-10-30

## 2018-08-14 RX ORDER — HYDROMORPHONE HYDROCHLORIDE 1 MG/ML
.3-.5 INJECTION, SOLUTION INTRAMUSCULAR; INTRAVENOUS; SUBCUTANEOUS EVERY 5 MIN PRN
Status: DISCONTINUED | OUTPATIENT
Start: 2018-08-14 | End: 2018-08-14 | Stop reason: HOSPADM

## 2018-08-14 RX ORDER — CEFAZOLIN SODIUM 2 G/100ML
2 INJECTION, SOLUTION INTRAVENOUS
Status: COMPLETED | OUTPATIENT
Start: 2018-08-14 | End: 2018-08-14

## 2018-08-14 RX ORDER — ONDANSETRON 2 MG/ML
INJECTION INTRAMUSCULAR; INTRAVENOUS PRN
Status: DISCONTINUED | OUTPATIENT
Start: 2018-08-14 | End: 2018-08-14

## 2018-08-14 RX ORDER — HYDROMORPHONE HYDROCHLORIDE 1 MG/ML
0.2 INJECTION, SOLUTION INTRAMUSCULAR; INTRAVENOUS; SUBCUTANEOUS
Status: DISCONTINUED | OUTPATIENT
Start: 2018-08-14 | End: 2018-08-14 | Stop reason: HOSPADM

## 2018-08-14 RX ORDER — SODIUM CHLORIDE, SODIUM LACTATE, POTASSIUM CHLORIDE, CALCIUM CHLORIDE 600; 310; 30; 20 MG/100ML; MG/100ML; MG/100ML; MG/100ML
INJECTION, SOLUTION INTRAVENOUS CONTINUOUS PRN
Status: DISCONTINUED | OUTPATIENT
Start: 2018-08-14 | End: 2018-08-14

## 2018-08-14 RX ORDER — EPHEDRINE SULFATE 50 MG/ML
INJECTION, SOLUTION INTRAMUSCULAR; INTRAVENOUS; SUBCUTANEOUS PRN
Status: DISCONTINUED | OUTPATIENT
Start: 2018-08-14 | End: 2018-08-14

## 2018-08-14 RX ORDER — ONDANSETRON 4 MG/1
4 TABLET, ORALLY DISINTEGRATING ORAL EVERY 30 MIN PRN
Status: DISCONTINUED | OUTPATIENT
Start: 2018-08-14 | End: 2018-08-14 | Stop reason: HOSPADM

## 2018-08-14 RX ORDER — NALOXONE HYDROCHLORIDE 0.4 MG/ML
.1-.4 INJECTION, SOLUTION INTRAMUSCULAR; INTRAVENOUS; SUBCUTANEOUS
Status: DISCONTINUED | OUTPATIENT
Start: 2018-08-14 | End: 2018-08-14 | Stop reason: HOSPADM

## 2018-08-14 RX ADMIN — ONDANSETRON 4 MG: 2 INJECTION INTRAMUSCULAR; INTRAVENOUS at 14:17

## 2018-08-14 RX ADMIN — DEXAMETHASONE SODIUM PHOSPHATE 4 MG: 4 INJECTION, SOLUTION INTRA-ARTICULAR; INTRALESIONAL; INTRAMUSCULAR; INTRAVENOUS; SOFT TISSUE at 14:16

## 2018-08-14 RX ADMIN — SODIUM CHLORIDE, POTASSIUM CHLORIDE, SODIUM LACTATE AND CALCIUM CHLORIDE: 600; 310; 30; 20 INJECTION, SOLUTION INTRAVENOUS at 13:55

## 2018-08-14 RX ADMIN — FENTANYL CITRATE 25 MCG: 50 INJECTION, SOLUTION INTRAMUSCULAR; INTRAVENOUS at 16:57

## 2018-08-14 RX ADMIN — Medication 5 MG: at 14:22

## 2018-08-14 RX ADMIN — WATER 1000 ML: 100 IRRIGANT IRRIGATION at 14:14

## 2018-08-14 RX ADMIN — WATER 3000 ML: 100 IRRIGANT IRRIGATION at 14:15

## 2018-08-14 RX ADMIN — Medication 10 MG: at 14:08

## 2018-08-14 RX ADMIN — PROPOFOL 20 MG: 10 INJECTION, EMULSION INTRAVENOUS at 15:46

## 2018-08-14 RX ADMIN — Medication 5 MG: at 14:25

## 2018-08-14 RX ADMIN — Medication 5 MG: at 14:12

## 2018-08-14 RX ADMIN — CEFAZOLIN SODIUM 2 G: 2 INJECTION, SOLUTION INTRAVENOUS at 14:09

## 2018-08-14 RX ADMIN — PROPOFOL 200 MG: 10 INJECTION, EMULSION INTRAVENOUS at 14:03

## 2018-08-14 RX ADMIN — FENTANYL CITRATE 100 MCG: 50 INJECTION, SOLUTION INTRAMUSCULAR; INTRAVENOUS at 14:03

## 2018-08-14 ASSESSMENT — LIFESTYLE VARIABLES: TOBACCO_USE: 0

## 2018-08-14 NOTE — ANESTHESIA CARE TRANSFER NOTE
Patient: Antonio Ramirez    Procedure(s):  CYSTOSCOPY, LEFT URETEROSCOPY, LEFT LASER HOLMIUM LITHOTRIPSY URETER(S) REMOVAL BILATERAL STENTS - Wound Class: II-Clean Contaminated    Diagnosis: LEFT KIDNEY STONE  Diagnosis Additional Information: No value filed.    Anesthesia Type:   General, LMA     Note:  Airway :Face Mask  Patient transferred to:PACU  Comments: Anesthesia Care Note    Patient: Antonio Ramirez    Transferred to: PACU    Patient vital signs: Stable    Airway: FM    Monitors placed. VSS. PIV patent. No change in dentition. Report given to NANCIE Gilliam CRNA   8/14/2018  Handoff Report: Identifed the Patient, Identified the Reponsible Provider, Reviewed the pertinent medical history, Discussed the surgical course, Reviewed Intra-OP anesthesia mangement and issues during anesthesia, Set expectations for post-procedure period and Allowed opportunity for questions and acknowledgement of understanding      Vitals: (Last set prior to Anesthesia Care Transfer)    CRNA VITALS  8/14/2018 1536 - 8/14/2018 1612      8/14/2018             Resp Rate (set): 10                Electronically Signed By: DIPAK Chávez CRNA  August 14, 2018  4:12 PM

## 2018-08-14 NOTE — OR NURSING
Report called to Marlena nurse on observation unit.  Called wife to notify her that pt will be going to the observation unit for extended recovery, gave her the phone number to observation unit.  Pt escorted to floor with chart and bag of medications.  Pharmacy closed, unable to send medications back to pharmacy for them to send to observation.    Pt can resume Xarelto in 2-3 days as urine becomes clear to light pink per Dr. Hunter, noted on discharge instructions.

## 2018-08-14 NOTE — ANESTHESIA PREPROCEDURE EVALUATION
"Procedure: Procedure(s):  COMBINED CYSTOSCOPY, URETEROSCOPY, LASER HOLMIUM LITHOTRIPSY URETER(S), INSERT STENT  Preop diagnosis: LEFT KIDNEY STONE    Allergies   Allergen Reactions     Ciprofloxacin Itching     Severe itching \"like ants were crawling\"     Hmg-Coa-R Inhibitors Other (See Comments)     Rhabdomyolysis occurred within a couple days     Past Medical History:   Diagnosis Date     Alcoholism (H)      Amputated left leg (H)      Anemia      Anticardiolipin antibody syndrome (H)      Antiplatelet or antithrombotic long-term use      Aortic stenosis      Arthritis      Balance problem      Cardiomyopathy (H)      Coagulation disorder (H)     cardiolipinantibody syndrome     Coronary artery disease      Gastro-oesophageal reflux disease      Heart attack 2007    apparently arrested, cardiac X5     Hiatal hernia      Hypercholesterolemia      Hypertension      Left foot drop      Liver disease     alcoholic liver disease, alcoholic cirrohsosis, portal hypertension     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)     ?When diagnosed, patient not aware of this     Moderate mitral regurgitation      Monoclonal gammopathy      Neuropathy      Noninfectious ileitis     diverticulitis of colon     PE (pulmonary embolism) 2006     Prostate cancer (H) 2000    Treated with radiation (seed implants in 2000) -- no problems since     Renal disease     kidney stones     Syncope      Thoracic aortic aneurysm, without rupture      Thrombocytopenia (H)      Thrombosis of leg      Urethral stricture      Past Surgical History:   Procedure Laterality Date     AMPUTATE LEG BELOW KNEE Left 04/2014    related to gangrene, started as foot ulcer related to neuropathy     AMPUTATE LEG BELOW KNEE Left 12/4/2017    Procedure: AMPUTATE LEG BELOW KNEE;  Exploration of Left Leg Below Knee Amputation Stump with Ligation of Bleeders.;  Surgeon: Leandro Arreola MD;  Location: [facility]     APPENDECTOMY       APPLY WOUND VAC Left " 12/6/2017    Procedure: APPLY WOUND VAC;;  Surgeon: Saima Howard MD;  Location:  [facility]     ARTHROPLASTY KNEE Right 9/19/2014    Procedure: ARTHROPLASTY KNEE;  Surgeon: Saima Howard MD;  Location:  OR     CARDIAC SURGERY      CABG     CHOLECYSTECTOMY       COLONOSCOPY       COMBINED CYSTOSCOPY, RETROGRADES, EXCHANGE STENT URETER(S) Left 7/24/2018    Procedure: COMBINED CYSTOSCOPY, RETROGRADES, EXCHANGE STENT URETER(S);  CYSTOSCOPY, BILATERAL RETROGRADES, BILATERAL URETERAL STENT EXCHANGE ;  Surgeon: Matt Cervantes MD;  Location:  OR     CRANIOTOMY Right 4/7/2018    Procedure: CRANIOTOMY;  Right Craniotomy For Subdural Hematoma;  Surgeon: Jono Issa MD;  Location:  OR     CYSTOSCOPY, DILATE URETHRA, COMBINED N/A 10/12/2016    Procedure: COMBINED CYSTOSCOPY, DILATE URETHRA;  Surgeon: Dimitry Bello MD;  Location:  OR     CYSTOSCOPY, REMOVE STENT(S), COMBINED Right 7/24/2018    Procedure: COMBINED CYSTOSCOPY, REMOVE STENT(S);;  Surgeon: Jose Hunter MD;  Location:  OR     ENDOSCOPIC STRIPPING VEIN(S)       esophagogastroduodenoscopy       EYE SURGERY      cataracts     GENITOURINARY SURGERY      Prostate Seen Implants     HERNIA REPAIR      Umbilical     INCISION AND DRAINAGE LOWER EXTREMITY, COMBINED Left 12/1/2017    Procedure: COMBINED INCISION AND DRAINAGE LOWER EXTREMITY;  INCISION AND DRAINAGE OF LEFT BELOW KNEE AMPUTATION ABSCESS, WOUND VAC PLACEMENT;  Surgeon: Saima Howard MD;  Location:  OR     IR IVC FILTER PLACEMENT       IRRIGATION AND DEBRIDEMENT LOWER EXTREMITY, COMBINED Left 12/6/2017    Procedure: COMBINED IRRIGATION AND DEBRIDEMENT LOWER EXTREMITY;  IRRIGATION AND DEBRIDEMENT OF LEFT BELOW KNEE AMPUTATION SITE WITH WOUND VAC REPLACEMENT;  Surgeon: Saima Howard MD;  [facility]     IRRIGATION AND DEBRIDEMENT LOWER EXTREMITY, COMBINED Left 12/8/2017    Procedure: COMBINED IRRIGATION AND DEBRIDEMENT LOWER EXTREMITY;  IRRIGATION  AND DEBRIDEMENT OF LEFT BELOW THE KNEE AMPUTATION AND SHORTENING OF THE TIBIA WITH WOUND CLOSURE;  Surgeon: Saima Howard MD;  Location:  OR     LASER HOLMIUM LITHOTRIPSY URETER(S), INSERT STENT, COMBINED Bilateral 6/28/2018    Procedure: COMBINED CYSTOSCOPY, URETEROSCOPY, LASER HOLMIUM LITHOTRIPSY URETER(S), INSERT STENT;  CYSTOSCOPY, BILATERAL URETEROSCOPY,  HOLMIUM LASER LITHOTRIPSY, BILATERAL STENT PLACEMENT, STONE BASKETING;  Surgeon: Jose Hunter MD;  Location:  OR     ORTHOPEDIC SURGERY      (R) knee scope     ORTHOPEDIC SURGERY      total hip      ORTHOPEDIC SURGERY      elbow surgery     Social History   Substance Use Topics     Smoking status: Never Smoker     Smokeless tobacco: Never Used     Alcohol use Yes      Comment: quit 10/2015; now 5-6 Vodkas/night     Prior to Admission medications    Medication Sig Start Date End Date Taking? Authorizing Provider   aspirin 81 MG tablet Take 81 mg by mouth daily    Reported, Patient   carvedilol (COREG) 3.125 MG tablet Take 1 tablet (3.125 mg) by mouth 2 times daily (with meals) 4/27/18   Main Hardy MD   fluticasone (FLONASE) 50 MCG/ACT spray Spray 1 spray into both nostrils 2 times daily     Reported, Patient   gabapentin (NEURONTIN) 600 MG tablet Take 1 tablet (600 mg) by mouth 3 times daily 6/27/18   Main Hardy MD   ipratropium (ATROVENT) 0.06 % spray Spray 2 sprays into both nostrils 4 times daily as needed for rhinitis 7/30/18   Main Hardy MD   phenazopyridine (PYRIDIUM) 100 MG tablet Take 1 tablet (100 mg) by mouth 3 times daily as needed for urinary tract discomfort 5/30/18   Main Hardy MD   rivaroxaban ANTICOAGULANT (XARELTO) 10 MG TABS tablet Take 1 tablet (10 mg) by mouth daily (with dinner) 5/30/18   Main Hardy MD   sulfamethoxazole-trimethoprim (BACTRIM DS/SEPTRA DS) 800-160 MG per tablet Take 1 tablet by mouth 2 times daily for 21 days 7/25/18 8/15/18  Emely Ching PA-C   torsemide  (DEMADEX) 20 MG tablet Take 20 mg by mouth daily    Unknown, Entered By History     No current Epic-ordered facility-administered medications on file.      No current [facility]-ordered outpatient prescriptions on file.       Wt Readings from Last 1 Encounters:   07/30/18 102.5 kg (226 lb)     Temp Readings from Last 1 Encounters:   07/30/18 36.2  C (97.2  F) (Tympanic)     BP Readings from Last 6 Encounters:   07/30/18 133/69   07/25/18 120/73   07/23/18 107/55   07/18/18 104/57   07/13/18 148/77   07/06/18 110/63     Pulse Readings from Last 4 Encounters:   07/30/18 57   07/24/18 72   07/18/18 55   07/13/18 66     Resp Readings from Last 1 Encounters:   07/25/18 14     SpO2 Readings from Last 1 Encounters:   07/30/18 96%     Recent Labs   Lab Test  07/25/18   0841  07/24/18   0815   NA  137  135   POTASSIUM  4.2  4.1   CHLORIDE  107  106   CO2  21  18*   ANIONGAP  9  11   GLC  149*  86   BUN  31*  34*   CR  1.40*  1.67*   BENNIE  7.2*  7.3*     Recent Labs   Lab Test  07/24/18   0815  05/22/18   0737   AST  30  41   ALT  17  16   ALKPHOS  64  89   BILITOTAL  1.3  1.5*     Recent Labs   Lab Test  07/25/18   0841  07/24/18   0815   WBC  3.9*  5.5   HGB  10.3*  10.0*   PLT  76*  66*     Recent Labs   Lab Test  07/24/18   1335   ABO  O   RH  Pos     Recent Labs   Lab Test  05/24/18   0700  04/11/18   0807   12/04/17   0806   INR  1.68*  1.39*   < >  1.65*   PTT  37   --    --   37    < > = values in this interval not displayed.      Recent Labs   Lab Test  05/21/18   1719  04/07/18   1240  01/29/18   0640   TROPI  0.027  <0.015  <0.015     Recent Labs   Lab Test  04/07/18   2343  04/07/18   1737   PH  7.37  7.39   PCO2  36  35   PO2  165*  161*   HCO3  21  21     No results for input(s): HCG in the last 04945 hours.  Recent Results (from the past 744 hour(s))   XR Chest 2 Views    Narrative    XR CHEST 2 VW 7/23/2018 2:14 PM    HISTORY: fever, cough, pneumonia;       Impression    IMPRESSION: Postoperative change. Shallow  inspiration with some mild  atelectasis in the lung bases. Chest otherwise negative.    MIKO ALSTON MD   CT Abdomen Pelvis w/o Contrast    Narrative    CT ABDOMEN AND PELVIS WITHOUT CONTRAST 7/24/2018 3:41 PM     HISTORY: Left ureteral stones.     COMPARISON: May 22, 2018    TECHNIQUE: Axial CT images of the abdomen and pelvis from the  diaphragm to the symphysis pubis were acquired without IV contrast.  Radiation dose for this scan was reduced using automated exposure  control, adjustment of the mA and/or kV according to patient size, or  iterative reconstruction technique.    FINDINGS: 3 mm stone adjacent to the stent in the region of the  crossing of the iliac vasculature in the left ureter. Stone versus  cluster of stones in the inferior pole of the left kidney measuring  1.8 cm. Bilateral ureteral stents in place. No right-sided stones. No  hydronephrosis. There is mild bilateral perinephric fat stranding.  Kidneys are normal in size and configuration. Small amount of free  fluid. No free air. There is moderate diverticulosis without evidence  for diverticulitis. There are no dilated loops of small intestine or  large bowel to suggest ileus or obstruction. There are extensive  atherosclerotic changes of the visualized aorta and its branches.  There is no evidence of aortic aneurysm. No splenomegaly. No definite  adrenal nodules. Pancreas grossly unremarkable. The remainder of the  visualized abdomen is unremarkable on this noncontrast scan. Survey of  the visualized bony structures demonstrates no destructive bony  lesions. Small right and trace left pleural effusion with associated  atelectasis and/or infiltrate. There are extensive coronary vascular  calcifications and/or stents consistent with coronary artery disease.      Impression    IMPRESSION:   1. 3 mm stone adjacent to the stent approximately at the level of the  crossing of the iliac vasculature on the left.  2. Stone or cluster of stones in the  inferior pole of the left kidney  measuring 1.8 cm.    CARMEN SERRATO MD   XR Surgery VIK L/T 5 Min Fluoro w Stills    Narrative    This exam was marked as non-reportable because it will not be read by a   radiologist or a [place] non-radiologist provider.             US Abdomen Limited    Narrative    ULTRASOUND ABDOMEN LIMITED   7/31/2018 1:25 PM     HISTORY:  Other ascites.    COMPARISON: Paracentesis 7/27/2017, 7/24/2018    FINDINGS: Limited 4 quadrant abdominal ultrasound was performed.  Images show small amount of ascites.      Impression    IMPRESSION: Small amount of ascites.     PRETTY JO DO       RECENT LABS:   ECG:   ECHO: The aortic Sinus(es) of Valsalva are mildly dilated at 4.3 cm and the  ascending aorta is Borderline dilated at 3.7 cm (The upper limit of normal is  3.7cm for aortic root diameter.)  There is mild to moderate (1+ to 2+) tricuspid regurgitation. The right  ventricular systolic pressure is approximated at 71mmHg plus the right atrial  pressure is elevated, consistent with severe pulmonary hypertension.  The left ventricle is normal in size with moderately decreased left  ventricular systolic function  The visual ejection fraction is estimated at 40-45% is due to anterior,  septal, and apical wall akinesis.  Also, a flattened septum is consistent with RV pressure overload.  Diastolic function not assessed due to atrial fibrillation but the diastolic  Doppler findings (E/E' ratio = 30 and/or other parameters) suggest left  ventricular filling pressures are increased.  There is mod-severe biatrial enlargement. The left atrium is severely dilated  by volume criteria at 55.7 ml/m2. Severe LA dilation is considered > 48 ml/m2.  There is moderate (2+) mitral regurgitation.  The right ventricle is mild to moderately dilated and the right ventricular  systolic function is borderline reduced.  Severe pulmonary HTN remains. MR is similar.  Compared to the prior study dated 1-,  there have been no changes.    Anesthesia Evaluation     . Pt has had prior anesthetic.     No history of anesthetic complications          ROS/MED HX    ENT/Pulmonary:      (-) tobacco use   Neurologic: Comment: History of subdural hematoma with crani      Cardiovascular:     (+) hypertension--CAD, --. Taking blood thinners : . CHF . fainting (syncope). :. .       METS/Exercise Tolerance:     Hematologic:     (+) History of blood clots pt is anticoagulated, -      Musculoskeletal:         GI/Hepatic:     (+) GERD hiatal hernia, liver disease,       Renal/Genitourinary:     (+) chronic renal disease,       Endo:         Psychiatric:         Infectious Disease:         Malignancy:   (+) Malignancy History of Prostate          Other:                     Physical Exam  Normal systems: cardiovascular, pulmonary and dental    Airway   Mallampati: I  Neck ROM: full    Dental     Cardiovascular       Pulmonary                     Anesthesia Plan      History & Physical Review  History and physical reviewed and following examination; no interval change.    ASA Status:  3 .    NPO Status:  > 8 hours    Plan for General and LMA   PONV prophylaxis:  Ondansetron (or other 5HT-3)       Postoperative Care  Postoperative pain management:  IV analgesics.      Consents  Anesthetic plan, risks, benefits and alternatives discussed with:  Patient..                          .

## 2018-08-14 NOTE — IP AVS SNAPSHOT
MRN:[id number]                      After Visit Summary   8/14/2018    Antonio Ramirez    MRN: [id number]           Thank you!     Thank you for choosing [facility] for your care. Our goal is always to provide you with excellent care. Hearing back from our patients is one way we can continue to improve our services. Please take a few minutes to complete the written survey that you may receive in the mail after you visit with us. Thank you!        Patient Information     Date Of Birth          1945        About your hospital stay     You were admitted on:  August 14, 2018 [facility]    You were discharged on:  August 14, 2018       Who to Call     For medical emergencies, please call 911.  For non-urgent questions about your medical care, please call your primary care provider or clinic, [phone]  For questions related to your surgery, please call your surgery clinic        Attending Provider     Provider Jose Yoo MD Urology       Primary Care Provider Office Phone # Fax #    [facility] [phone]      After Care Instructions     Diet Instructions       Resume pre-procedure diet            No Alcohol       For 24 hours post procedure            No driving or operating machinery        until the day after procedure                  Your next 10 appointments already scheduled     Aug 31, 2018   Procedure with Saima Howard MD   [facility] (--)    [address] [address] [phone]            Sep 17, 2018  2:30 PM CDT   CT HEAD W/O CONTRAST with SHCT1   [facility] CT ([facility])    [address] [id number] [phone]           Please bring any scans or X-rays taken at other hospitals, if similar tests were done. Also bring a list of your medicines, including vitamins, minerals and  over-the-counter drugs. It is safest to leave personal items at home.  Be sure to tell your doctor:   If you have any allergies.   If there s any chance you are pregnant.   If you are breastfeeding.  You do not need to do anything special to prepare for this exam.  Please wear loose clothing, such as a sweat suit or jogging clothes. Avoid snaps, zippers and other metal. We may ask you to undress and put on a hospital gown.            Oct 02, 2018 11:15 AM CDT   New Visit with Elena Downs MD   [facility] ([facility])    [address]  [facility] [address] [phone] OPT 2              Further instructions from your care team       Resume Xareltto in 2-3 days, as urine becomes clear to light pink.        Same Day Surgery Discharge Instructions for  Sedation and General Anesthesia       It's not unusual to feel dizzy, light-headed or faint for up to 24 hours after surgery or while taking pain medication.  If you have these symptoms: sit for a few minutes before standing and have someone assist you when you get up to walk or use the bathroom.      You should rest and relax for the next 24 hours. We recommend you make arrangements to have an adult stay with you for at least 24 hours after your discharge.  Avoid hazardous and strenuous activity.      DO NOT DRIVE any vehicle or operate mechanical equipment for 24 hours following the end of your surgery.  Even though you may feel normal, your reactions may be affected by the medication you have received.      Do not drink alcoholic beverages for 24 hours following surgery.       Slowly progress to your regular diet as you feel able. It's not unusual to feel nauseated and/or vomit after receiving anesthesia.  If you develop these symptoms, drink clear liquids (apple juice, ginger ale, broth, 7-up, etc. ) until you feel better.  If your nausea and vomiting persists for 24 hours, please notify your  surgeon.        All narcotic pain medications, along with inactivity and anesthesia, can cause constipation. Drinking plenty of liquids and increasing fiber intake will help.      For any questions of a medical nature, call your surgeon.      Do not make important decisions for 24 hours.      If you had general anesthesia, you may have a sore throat for a couple of days related to the breathing tube used during surgery.  You may use Cepacol lozenges to help with this discomfort.  If it worsens or if you develop a fever, contact your surgeon.       If you feel your pain is not well managed with the pain medications prescribed by your surgeon, please contact your surgeon's office to let them know so they can address your concerns.         Cystoscopy and Stent Placement Discharge Instructions    During surgery, a stent was placed in the ureter.  The ureter is the tube that drains urine from the kidney to the bladder.  The stent is placed to dilate (open) the ureter so the stone fragments can pass easily through the ureter or to decrease ureteral swelling after surgery, or to relieve an obstruction. The stent is made of rubber. The upper end of the stent curls in the kidney while the lower end rests in the bladder      Diet:    Return to the diet that you were on before the procedure, unless you are given specific diet instructions.    It is important to drink 6-8 glasses of fluids per day at home - at least 3-4 glasses should be water.    Activity:    Walk short distances and increase as your strength allows.    You may climb stairs.    Do not do strenuous exercise or heavy lifting until approved by surgeon.    Do not drive while taking narcotic pain medications.    Bathing:    You may take a shower.    While the stent is in place you may experience the following symptoms:    Blood and/or small blood clots in urine.    Bladder spasm (frequency and urgency of urination).    Discomfort or aching in the back or side where  the stent is.    Burning or discomfort at the end of urine stream.    To decrease these symptoms you should:    Take pain medication as prescribed.    Drink plenty of fluids.    If you experience pain at the end of urination try not emptying your bladder completely.    If having discomfort in back or side, decrease activity.    Call your physician if these signs/symptoms are present:    Pain that is not relieved by a short rest or ordered pain medications.    Temperature at or above 101.0 F or chills.    Inability or difficulty urinating.     Excessive blood in urine.    Any questions or concerns.          **If you have questions or concerns about your procedure,   Call Dr. Hunter at [phone]**    Pending Results     Date and Time Order Name Status Description    8/14/2018 1608 XR Surgery VIK Fluoro L/T 5 Min w Stills In process             Admission Information     Date & Time Provider Department Dept. Phone    8/14/2018 Jose Hunter MD [facility] [phone]      Your Vitals Were     Blood Pressure Temperature Respirations Weight Pulse Oximetry BMI (Body Mass Index)    124/66 (BP Location: Right arm) 97  F (36.1  C) (Oral) 16 105.7 kg (233 lb) 94% 29.92 kg/m2      MyChart Information     [employer] gives you secure access to your electronic health record. If you see a primary care provider, you can also send messages to your care team and make appointments. If you have questions, please call your primary care clinic.  If you do not have a primary care provider, please call [phone] and they will assist you.        Care EveryWhere ID     This is your Care EveryWhere ID. This could be used by other organizations to access your [facility] medical records  [id number]        Equal Access to Services     [facility]: Evelin Cabrera, belén grande, igor moses. So [facility] [phone].    ATENCIÓN: Niko frances,  tiene a shrestha disposición servicios gratuitos de asistencia lingüística. Anabelle santana [phone].    We comply with applicable federal civil rights laws and [place] laws. We do not discriminate on the basis of race, color, national origin, age, disability, sex, sexual orientation, or gender identity.               Review of your medicines      CONTINUE these medicines which may have CHANGED, or have new prescriptions. If we are uncertain of the size of tablets/capsules you have at home, strength may be listed as something that might have changed.        Dose / Directions    * sulfamethoxazole-trimethoprim 800-160 MG per tablet   Commonly known as:  BACTRIM DS/SEPTRA DS   This may have changed:  Another medication with the same name was added. Make sure you understand how and when to take each.   Used for:  Severe sepsis (H), Infection associated with indwelling ureteral stent, initial encounter (H)        Dose:  1 tablet   Take 1 tablet by mouth 2 times daily for 21 days   Quantity:  42 tablet   Refills:  0       * sulfamethoxazole-trimethoprim 800-160 MG per tablet   Commonly known as:  BACTRIM DS/SEPTRA DS   This may have changed:  You were already taking a medication with the same name, and this prescription was added. Make sure you understand how and when to take each.   Used for:  Ureteral stone        Dose:  1 tablet   Start taking on:  8/15/2018   Take 1 tablet by mouth 2 times daily   Quantity:  14 tablet   Refills:  0       * Notice:  This list has 2 medication(s) that are the same as other medications prescribed for you. Read the directions carefully, and ask your doctor or other care provider to review them with you.      CONTINUE these medicines which have NOT CHANGED        Dose / Directions    aspirin 81 MG tablet        Dose:  81 mg   Take 81 mg by mouth daily   Refills:  0       carvedilol 3.125 MG tablet   Commonly known as:  COREG        Dose:  3.125 mg   Take 1 tablet (3.125 mg) by mouth 2 times  daily (with meals)   Quantity:  180 tablet   Refills:  1       fluticasone 50 MCG/ACT spray   Commonly known as:  FLONASE        Dose:  1 spray   Spray 1 spray into both nostrils 2 times daily   Refills:  0       gabapentin 600 MG tablet   Commonly known as:  NEURONTIN   Used for:  Phantom limb pain (H)        Dose:  600 mg   Take 1 tablet (600 mg) by mouth 3 times daily   Quantity:  270 tablet   Refills:  3       ipratropium 0.06 % spray   Commonly known as:  ATROVENT   Used for:  Chronic vasomotor rhinitis        Dose:  2 spray   Spray 2 sprays into both nostrils 4 times daily as needed for rhinitis   Quantity:  3 Box   Refills:  3       phenazopyridine 100 MG tablet   Commonly known as:  PYRIDIUM   Used for:  Urinary tract infection without hematuria, site unspecified        Dose:  100 mg   Take 1 tablet (100 mg) by mouth 3 times daily as needed for urinary tract discomfort   Quantity:  90 tablet   Refills:  3       torsemide 20 MG tablet   Commonly known as:  DEMADEX        Dose:  20 mg   Take 20 mg by mouth daily   Refills:  0       XARELTO 10 MG Tabs tablet   Generic drug:  rivaroxaban ANTICOAGULANT        Dose:  10 mg   Take 1 tablet (10 mg) by mouth daily (with dinner)   Refills:  0            Where to get your medicines      These medications were sent to [facility] [place]  [place], [place] - [address]  [address] [facility] [address], [place] [address]     Phone:  [phone]     sulfamethoxazole-trimethoprim 800-160 MG per tablet                Protect others around you: Learn how to safely use, store and throw away your medicines at www.disposemymeds.org.        ANTIBIOTIC INSTRUCTION     You've Been Prescribed an Antibiotic - Now What?  Your healthcare team thinks that you or your loved one might have an infection. Some infections can be treated with antibiotics, which are powerful, life-saving drugs. Like all medications, antibiotics have side effects and should only be used when necessary. There  are some important things you should know about your antibiotic treatment.      Your healthcare team may run tests before you start taking an antibiotic.    Your team may take samples (e.g., from your blood, urine or other areas) to run tests to look for bacteria. These test can be important to determine if you need an antibiotic at all and, if you do, which antibiotic will work best.      Within a few days, your healthcare team might change or even stop your antibiotic.    Your team may start you on an antibiotic while they are working to find out what is making you sick.    Your team might change your antibiotic because test results show that a different antibiotic would be better to treat your infection.    In some cases, once your team has more information, they learn that you do not need an antibiotic at all. They may find out that you don't have an infection, or that the antibiotic you're taking won't work against your infection. For example, an infection caused by a virus can't be treated with antibiotics. Staying on an antibiotic when you don't need it is more likely to be harmful than helpful.      You may experience side effects from your antibiotic.    Like all medications, antibiotics have side effects. Some of these can be serious.    Let you healthcare team know if you have any known allergies when you are admitted to the hospital.    One significant side effect of nearly all antibiotics is the risk of severe and sometimes deadly diarrhea caused by Clostridium difficile (C. Difficile). This occurs when a person takes antibiotics because some good germs are destroyed. Antibiotic use allows C. diificile to take over, putting patients at high risk for this serious infection.    As a patient or caregiver, it is important to understand your or your loved one's antibiotic treatment. It is especially important for caregivers to speak up when patients can't speak for themselves. Here are some important  questions to ask your healthcare team.    What infection is this antibiotic treating and how do you know I have that infection?    What side effects might occur from this antibiotic?    How long will I need to take this antibiotic?    Is it safe to take this antibiotic with other medications or supplements (e.g., vitamins) that I am taking?     Are there any special directions I need to know about taking this antibiotic? For example, should I take it with food?    How will I be monitored to know whether my infection is responding to the antibiotic?    What tests may help to make sure the right antibiotic is prescribed for me?      Information provided by:  www.cdc.gov/getsmart  U.S. Department of Health and Human Services  Centers for disease Control and Prevention  National Center for Emerging and Zoonotic Infectious Diseases  Division of Healthcare Quality Promotion             Medication List: This is a list of all your medications and when to take them. Check marks below indicate your daily home schedule. Keep this list as a reference.      Medications           Morning Afternoon Evening Bedtime As Needed    aspirin 81 MG tablet   Take 81 mg by mouth daily                                   carvedilol 3.125 MG tablet   Commonly known as:  COREG   Take 1 tablet (3.125 mg) by mouth 2 times daily (with meals)                                      fluticasone 50 MCG/ACT spray   Commonly known as:  FLONASE   Spray 1 spray into both nostrils 2 times daily                                      gabapentin 600 MG tablet   Commonly known as:  NEURONTIN   Take 1 tablet (600 mg) by mouth 3 times daily                                         ipratropium 0.06 % spray   Commonly known as:  ATROVENT   Spray 2 sprays into both nostrils 4 times daily as needed for rhinitis                                   phenazopyridine 100 MG tablet   Commonly known as:  PYRIDIUM   Take 1 tablet (100 mg) by mouth 3 times daily as needed for  urinary tract discomfort                                   * sulfamethoxazole-trimethoprim 800-160 MG per tablet   Commonly known as:  BACTRIM DS/SEPTRA DS   Take 1 tablet by mouth 2 times daily for 21 days                                      * sulfamethoxazole-trimethoprim 800-160 MG per tablet   Commonly known as:  BACTRIM DS/SEPTRA DS   Take 1 tablet by mouth 2 times daily   Start taking on:  8/15/2018                                      torsemide 20 MG tablet   Commonly known as:  DEMADEX   Take 20 mg by mouth daily                                   XARELTO 10 MG Tabs tablet   Take 1 tablet (10 mg) by mouth daily (with dinner)   Generic drug:  rivaroxaban ANTICOAGULANT                                   * Notice:  This list has 2 medication(s) that are the same as other medications prescribed for you. Read the directions carefully, and ask your doctor or other care provider to review them with you.

## 2018-08-14 NOTE — OR NURSING
LEFT UPPER ARM VERY RED AND BRUISED, PATIENT STATES HE FELL A WEEK AGO. PATIENT STATES HE HAS  BEEN FALLING MORE OFTEN. DR SALDAÑA AND DR MODI NOTIFIED.

## 2018-08-14 NOTE — DISCHARGE INSTRUCTIONS
Resume Xareltto in 2-3 days, as urine becomes clear to light pink.        Same Day Surgery Discharge Instructions for  Sedation and General Anesthesia       It's not unusual to feel dizzy, light-headed or faint for up to 24 hours after surgery or while taking pain medication.  If you have these symptoms: sit for a few minutes before standing and have someone assist you when you get up to walk or use the bathroom.      You should rest and relax for the next 24 hours. We recommend you make arrangements to have an adult stay with you for at least 24 hours after your discharge.  Avoid hazardous and strenuous activity.      DO NOT DRIVE any vehicle or operate mechanical equipment for 24 hours following the end of your surgery.  Even though you may feel normal, your reactions may be affected by the medication you have received.      Do not drink alcoholic beverages for 24 hours following surgery.       Slowly progress to your regular diet as you feel able. It's not unusual to feel nauseated and/or vomit after receiving anesthesia.  If you develop these symptoms, drink clear liquids (apple juice, ginger ale, broth, 7-up, etc. ) until you feel better.  If your nausea and vomiting persists for 24 hours, please notify your surgeon.        All narcotic pain medications, along with inactivity and anesthesia, can cause constipation. Drinking plenty of liquids and increasing fiber intake will help.      For any questions of a medical nature, call your surgeon.      Do not make important decisions for 24 hours.      If you had general anesthesia, you may have a sore throat for a couple of days related to the breathing tube used during surgery.  You may use Cepacol lozenges to help with this discomfort.  If it worsens or if you develop a fever, contact your surgeon.       If you feel your pain is not well managed with the pain medications prescribed by your surgeon, please contact your surgeon's office to let them know so they can  address your concerns.         Cystoscopy and Stent Placement Discharge Instructions    During surgery, a stent was placed in the ureter.  The ureter is the tube that drains urine from the kidney to the bladder.  The stent is placed to dilate (open) the ureter so the stone fragments can pass easily through the ureter or to decrease ureteral swelling after surgery, or to relieve an obstruction. The stent is made of rubber. The upper end of the stent curls in the kidney while the lower end rests in the bladder      Diet:    Return to the diet that you were on before the procedure, unless you are given specific diet instructions.    It is important to drink 6-8 glasses of fluids per day at home - at least 3-4 glasses should be water.    Activity:    Walk short distances and increase as your strength allows.    You may climb stairs.    Do not do strenuous exercise or heavy lifting until approved by surgeon.    Do not drive while taking narcotic pain medications.    Bathing:    You may take a shower.    While the stent is in place you may experience the following symptoms:    Blood and/or small blood clots in urine.    Bladder spasm (frequency and urgency of urination).    Discomfort or aching in the back or side where the stent is.    Burning or discomfort at the end of urine stream.    To decrease these symptoms you should:    Take pain medication as prescribed.    Drink plenty of fluids.    If you experience pain at the end of urination try not emptying your bladder completely.    If having discomfort in back or side, decrease activity.    Call your physician if these signs/symptoms are present:    Pain that is not relieved by a short rest or ordered pain medications.    Temperature at or above 101.0 F or chills.    Inability or difficulty urinating.     Excessive blood in urine.    Any questions or concerns.          **If you have questions or concerns about your procedure,   Call Dr. Hunter at [phone]**

## 2018-08-14 NOTE — BRIEF OP NOTE
[facility] Brief Operative Note    Pre-operative diagnosis: LEFT KIDNEY STONE   Post-operative diagnosis left renal calculi, ureteral calculi     Procedure: CYSTOSCOPY, RIGHT RETROGRADE, REMOVAL RIGHT STENT,LEFT URETEROSCOPY,PYELOSCOPY , LASER LITHOTRIPSY, STONE REMOVAL , STENT EXCHANGE   Surgeon(s): Surgeon(s) and Role:     * Jose Hunter MD - Primary   Estimated blood loss: * No values recorded between 8/14/2018  2:14 PM and 8/14/2018  4:07 PM *    Specimens:   ID Type Source Tests Collected by Time Destination   1 : left renal and ureteral stones Calculus/Stone Ureter, Left STONE ANALYSIS Jose Hunter MD 8/14/2018  3:58 PM       Findings: MULTIPLE RESIDUAL LEFT RENAL AND URETERAL CALCULI  Anesthesia: General endotracheal anesthesia   Total IV fluids: (See anesthesia record)   Blood transfusion: No transfusion was given during surgery   Total urine output: (See anesthesia record)   Foreign Bodies: Left Ureteral StentPermanent Implants: NoneComplications: None    Condition: Stable   Comments: See dictated operative report for full details

## 2018-08-14 NOTE — IP AVS SNAPSHOT
[facility]    [address] [place] [address]    Phone:  [phone]                                       After Visit Summary   8/14/2018    Antonio Ramirez    MRN: [id number]           After Visit Summary Signature Page     I have received my discharge instructions, and my questions have been answered. I have discussed any challenges I see with this plan with the nurse or doctor.    ..........................................................................................................................................  Patient/Patient Representative Signature      ..........................................................................................................................................  Patient Representative Print Name and Relationship to Patient    ..................................................               ................................................  Date                                            Time    ..........................................................................................................................................  Reviewed by Signature/Title    ...................................................              ..............................................  Date                                                            Time

## 2018-08-15 LAB — COPATH REPORT: NORMAL

## 2018-08-15 NOTE — ANESTHESIA POSTPROCEDURE EVALUATION
Patient: Antonio Ramirez    Procedure(s):  CYSTOSCOPY, LEFT URETEROSCOPY, LEFT LASER HOLMIUM LITHOTRIPSY URETER(S) REMOVAL BILATERAL STENTS - Wound Class: II-Clean Contaminated    Diagnosis:LEFT KIDNEY STONE  Diagnosis Additional Information: No value filed.    Anesthesia Type:  General, LMA    Note:  Anesthesia Post Evaluation    Patient location during evaluation: PACU  Patient participation: Able to fully participate in evaluation  Level of consciousness: awake and alert  Pain management: satisfactory to patient  Airway patency: patent  Cardiovascular status: hemodynamically stable  Respiratory status: acceptable and unassisted  Hydration status: balanced  PONV: none     Anesthetic complications: None          Last vitals:  Vitals:    08/14/18 1730 08/14/18 1821 08/14/18 1850   BP: 114/65 135/61 124/66   Resp: 16 16 16   Temp: 36  C (96.8  F) 35.6  C (96  F) 36.1  C (97  F)   SpO2: 93% 94% 94%         Electronically Signed By: Lino Stone MD  August 14, 2018  10:22 PM

## 2018-08-15 NOTE — OP NOTE
Procedure Date: 08/14/2018      DATE OF SERVICE: 08/14/2018      PREOPERATIVE DIAGNOSIS:  History of bilateral ureteral calculi with urinary sepsis, previous bilateral percutaneous nephrostomy tubes, previous bilateral ESWL with bilateral indwelling ureteral stents.      PROCEDURE:  Cystoscopy, right retrograde ureteropyelogram, right ureteral stent removal, left ureteroscopy, left pyeloscopy, holmium laser lithotripsy, stone extraction, left ureteral stent exchange.      ESTIMATED BLOOD LOSS:  No recorded blood loss.      COMPLICATIONS:  None.      BRIEF HISTORY:  Antonio Ramirez is a 73-year-old gentleman who has multiple medical problems and has undergone multiple procedures for urinary stones in the past 3 months.  The patient's last procedure performed was back in July.  He was scheduled for cystoscopy retrograde with left ureteroscopy, pyeloscopy and stent exchange,  this was canceled.  Prior to that, the patient had had a cystoscopy, ureteroscopy, laser lithotripsy and bilateral stent placement with stone basketing.  The patient had persistent fragments and had stents left indwelling with plans for subsequent treatment.  Recently he was seen in the office and he was hoping to get the stents out as soon as possible.  We discussed options and a CT scan showed no residual calculi of any significance on the right side but multiple stones on the left side, so we decided to proceed with the removal of the right stent and then the ureteroscopy and pyeloscopy.  This  afternoon when the patient was seen preoperatively, it was noted that his weight was up again and it does fluctuate and he has had issues with weight whenever he does not use his diuretic and recently he has not been using his diuretic. He did state he is capable of sleeping in a supine position on his back and that he has not been having any significant coughing or hemoptysis. The anesthesiologist thought it was okay to proceed today and consent was  completed, reviewed and signed.  He had been off his anticoagulation for 5 to 7 days.      PROCEDURE:  The patient was brought to the OR, given a general anesthetic, prepped and draped in the usual dorsal lithotomy position.  A timeout taken.  The patient and procedure and OR staff identified.  Cystourethroscopic exam was performed showing a normal anterior urethra, previously radiated prostatic urethra. Bladder was entered and immediately I could see the 2 stents. The right stent was grasped with the biopsy forceps and brought out and then the right retrograde ureteropyelogram was performed showing no evidence of obstruction.  I then removed the left ureteral stent and threaded a Glidewire through the stent up to the renal pelvis and the stent was removed.  A double-lumen catheter was passed over the wire and passed up to the area just below the UPJ.  A second wire was passed.  I then removed the double-lumen ureteral catheter and passed a navigator sheath.  I could see there was a stone actually in the mid ureter, so the sheath was passed up to mid ureter.  I then threaded the flexible ureteroscope over the wire up to the stone and then the laser was used to break up the stone into multiple pieces and then I went ahead and removed the pieces using a basket.  I advanced the ureteroscope into the proximal ureter where there was additional stones that were lasered and fragmented and extracted with a basket. I could see there were additional stones, a few stones in the area of the kidney, so I went ahead and took a flexible scope out, put a wire through the navigator sheath and then put the introducer through the navigator sheath, so this could be advanced up to the renal pelvis safely. I then removed the introducer and then removed the wire and then passed ureteroscope now flexible scope through the navigator sheath up into the renal pelvis.  There were a few stones that were grasped using the zero tip basket and  then I was able to go down into the lateral and lower pole calices where additional stones were lasered, fragmented and then extracted.  At the end of the procedure, there were no larger fragments, just multiple very tiny stone fragments that I washed out as much as I could.  I then went ahead and backed out the scope, I pulled out the navigator sheath and then backloaded the wire through the cystoscope sheath and then passed a six 28-Emirati double-J ureteral stent,  the stent was in good position, the wire was removed and then the bladder was emptied and the procedure terminated.      PLAN:  The patient will be observed in observation overnight and if he is stable and wants to go home later today, he can, otherwise will be watched until the morning. He will have a followup KUB and noncontrast CT in about 7 days and then as long as there is no large fragments the cystoscopy and stent in the office can be performed.  Bactrim double strength p.o. twice a day  for the next 7 days.         DALI MODI MD             D: 2018   T: 08/15/2018   MT: RADAMES      Name:     GALILEA LUGO   MRN:      [id number]        Account:        [id number]   :      1945           Procedure Date: 2018      Document: [id number]

## 2018-08-15 NOTE — PLAN OF CARE
Problem: Patient Care Overview  Goal: Plan of Care/Patient Progress Review  Outcome: Improving  Patient has been discharged August 14, 2018 7:40 PM. Discharge instructions and education reviewed. Medication schedule and followup appointments discussed. Patient had no questions. All belongings sent home with patient.

## 2018-08-15 NOTE — PLAN OF CARE
Problem: Patient Care Overview  Goal: Plan of Care/Patient Progress Review  Outcome: Improving  PRIMARY DIAGNOSIS: Kidney Stone   OUTPATIENT/OBSERVATION GOALS TO BE MET BEFORE DISCHARGE:  1. Stable vital signs Yes  2. Tolerating diet:Yes  3. Pain controlled with oral pain medications:  Yes  4. Positive bowel sounds:  No  5. Voiding without difficulty:  Yes, although incontinence  6. Able to ambulate:  Yes  7. Provider specific discharge goals met:  Yes    Discharge Planner Nurse   Safe discharge environment identified: Yes  Barriers to discharge: No       Entered by: Marlena Guerrero 08/14/2018 7:14 PM     Please review provider order for any additional goals.   Nurse to notify provider when observation goals have been met and patient is ready for discharge.    A/O, VSS, SBA with walker and prosthesis left leg, denies pain, incontinent of urine (pink/red tinged) although patient feels he emptied his bladder, baseline neuropathy present, BS, no flatus, no nausea. Patient feels ready for discharge but will bladder scan just to make sure bladder is emptying.

## 2018-08-15 NOTE — PROVIDER NOTIFICATION
Elsa notified patient meets discharge criteria. Elsa EDWARDS says make sure it's not overflow incontinence. Bladder scan to make sure bladder not full. Otherwise Elsa is comfortable with patient discharging.

## 2018-08-16 ENCOUNTER — OFFICE VISIT (OUTPATIENT)
Dept: FAMILY MEDICINE | Facility: CLINIC | Age: 73
End: 2018-08-16
Payer: MEDICARE

## 2018-08-16 VITALS
BODY MASS INDEX: 30.62 KG/M2 | HEART RATE: 64 BPM | SYSTOLIC BLOOD PRESSURE: 99 MMHG | HEIGHT: 74 IN | OXYGEN SATURATION: 94 % | TEMPERATURE: 98.1 F | DIASTOLIC BLOOD PRESSURE: 58 MMHG | WEIGHT: 238.6 LBS

## 2018-08-16 DIAGNOSIS — S81.819A SKIN TEAR OF LOWER LEG WITHOUT COMPLICATION, INITIAL ENCOUNTER: ICD-10-CM

## 2018-08-16 DIAGNOSIS — D50.8 OTHER IRON DEFICIENCY ANEMIA: Primary | ICD-10-CM

## 2018-08-16 DIAGNOSIS — R60.0 PERIPHERAL EDEMA: ICD-10-CM

## 2018-08-16 LAB
BASOPHILS # BLD AUTO: 0 10E9/L (ref 0–0.2)
BASOPHILS NFR BLD AUTO: 0.4 %
DIFFERENTIAL METHOD BLD: ABNORMAL
EOSINOPHIL # BLD AUTO: 0.2 10E9/L (ref 0–0.7)
EOSINOPHIL NFR BLD AUTO: 3 %
ERYTHROCYTE [DISTWIDTH] IN BLOOD BY AUTOMATED COUNT: 25.9 % (ref 10–15)
HCT VFR BLD AUTO: 28.2 % (ref 40–53)
HGB BLD-MCNC: 9.1 G/DL (ref 13.3–17.7)
LYMPHOCYTES # BLD AUTO: 0.7 10E9/L (ref 0.8–5.3)
LYMPHOCYTES NFR BLD AUTO: 11.7 %
MCH RBC QN AUTO: 30.8 PG (ref 26.5–33)
MCHC RBC AUTO-ENTMCNC: 32.3 G/DL (ref 31.5–36.5)
MCV RBC AUTO: 96 FL (ref 78–100)
MONOCYTES # BLD AUTO: 0.7 10E9/L (ref 0–1.3)
MONOCYTES NFR BLD AUTO: 12.6 %
NEUTROPHILS # BLD AUTO: 4.1 10E9/L (ref 1.6–8.3)
NEUTROPHILS NFR BLD AUTO: 72.3 %
PLATELET # BLD AUTO: 110 10E9/L (ref 150–450)
RBC # BLD AUTO: 2.95 10E12/L (ref 4.4–5.9)
WBC # BLD AUTO: 5.6 10E9/L (ref 4–11)

## 2018-08-16 PROCEDURE — 82728 ASSAY OF FERRITIN: CPT | Performed by: NURSE PRACTITIONER

## 2018-08-16 PROCEDURE — 83550 IRON BINDING TEST: CPT | Performed by: NURSE PRACTITIONER

## 2018-08-16 PROCEDURE — 83540 ASSAY OF IRON: CPT | Performed by: NURSE PRACTITIONER

## 2018-08-16 PROCEDURE — 99214 OFFICE O/P EST MOD 30 MIN: CPT | Performed by: NURSE PRACTITIONER

## 2018-08-16 PROCEDURE — 85025 COMPLETE CBC W/AUTO DIFF WBC: CPT | Performed by: NURSE PRACTITIONER

## 2018-08-16 PROCEDURE — 80053 COMPREHEN METABOLIC PANEL: CPT | Performed by: NURSE PRACTITIONER

## 2018-08-16 PROCEDURE — 36415 COLL VENOUS BLD VENIPUNCTURE: CPT | Performed by: NURSE PRACTITIONER

## 2018-08-16 RX ORDER — TRANSPARENT DRESSING 3.5"X4"
1 BANDAGE TOPICAL
Qty: 6 EACH | Refills: 0 | Status: SHIPPED | OUTPATIENT
Start: 2018-08-16 | End: 2018-11-08

## 2018-08-16 NOTE — MR AVS SNAPSHOT
"              After Visit Summary   8/16/2018    Antonio Ramirez    MRN: [id number]           Patient Information     Date Of Birth          1945        Visit Information        Provider Department      8/16/2018 1:00 PM Elvia Suarez APRN [facility]        Today's Diagnoses     Other iron deficiency anemia    -  1    Skin tear of lower leg without complication, initial encounter        Peripheral edema          Care Instructions                Follow-ups after your visit        Additional Services     LYMPHEDEMA THERAPY REFERRAL       *This therapy referral will be filtered to a centralized scheduling office at [facility] and the patient will receive a call to schedule an appointment at a [place] location most convenient for them. *   If you have not heard from the scheduling office within 2 business days, please call [phone] for all locations, with the exception of [place], please call [phone].     Treatment: PT or OT Evaluation & Treatment  Special Instructions: please wrap bilateral lower extremities  PT/OT Treatment Diagnosis: Edema    Please be aware that coverage of these services is subject to the terms and limitations of your health insurance plan.  Call member services at your health plan with any benefit or coverage questions.      **Note to Provider:  If you are referring outside of [place] for the therapy appointment, please list the name of the location in the \"special instructions\" above, print the referral and give to the patient to schedule the appointment.                  Your next 10 appointments already scheduled     Aug 31, 2018   Procedure with Saima Howard MD   [facility] (--)    [address] [address]   [phone]            Sep 17, 2018  2:30 PM CDT   CT HEAD W/O CONTRAST with SHCT1   [facility] CT ([facility])    [address] [facility] " [address] [phone]           Please bring any scans or X-rays taken at other hospitals, if similar tests were done. Also bring a list of your medicines, including vitamins, minerals and over-the-counter drugs. It is safest to leave personal items at home.  Be sure to tell your doctor:   If you have any allergies.   If there s any chance you are pregnant.   If you are breastfeeding.  You do not need to do anything special to prepare for this exam.  Please wear loose clothing, such as a sweat suit or jogging clothes. Avoid snaps, zippers and other metal. We may ask you to undress and put on a hospital gown.            Oct 02, 2018 11:15 AM CDT   New Visit with Elena Downs MD   [facility] ([facility])    [address] [place] [address] [phone] OPT 2              Who to contact     If you have questions or need follow up information about today's clinic visit or your schedule please contact [facility] directly at [phone].  Normal or non-critical lab and imaging results will be communicated to you by [employer], letter or phone within 4 business days after the clinic has received the results. If you do not hear from us within 7 days, please contact the clinic through [employer] or phone. If you have a critical or abnormal lab result, we will notify you by phone as soon as possible.  Submit refill requests through [employer] or call your pharmacy and they will forward the refill request to us. Please allow 3 business days for your refill to be completed.          Additional Information About Your Visit        [employer] Information     [employer] gives you secure access to your electronic health record. If you see a primary care provider, you can also send messages to your care team and make appointments. If you have questions, please call your primary care clinic.  If you do not have a primary care provider, please call [phone]  "and they will assist you.        Care EveryWhere ID     This is your Care EveryWhere ID. This could be used by other organizations to access your [place] medical records  [id number]        Your Vitals Were     Pulse Temperature Height Pulse Oximetry BMI (Body Mass Index)       64 98.1  F (36.7  C) (Tympanic) 6' 2\" (1.88 m) 94% 30.63 kg/m2        Blood Pressure from Last 3 Encounters:   08/16/18 99/58   08/14/18 124/66   07/30/18 133/69    Weight from Last 3 Encounters:   08/16/18 238 lb 9.6 oz (108.2 kg)   08/14/18 233 lb (105.7 kg)   07/30/18 226 lb (102.5 kg)              We Performed the Following     CBC with platelets and differential     Comprehensive metabolic panel (BMP + Alb, Alk Phos, ALT, AST, Total. Bili, TP)     Dressing     Ferritin     LYMPHEDEMA THERAPY REFERRAL          Today's Medication Changes          These changes are accurate as of 8/16/18  1:41 PM.  If you have any questions, ask your nurse or doctor.               Start taking these medicines.        Dose/Directions    TEGADERM + PAD 3-1/2\"X4\" Misc   Used for:  Skin tear of lower leg without complication, initial encounter   Started by:  Elvia Suarez APRN CNP        Dose:  1 Application   1 Application every 3 days   Quantity:  6 each   Refills:  0            Where to get your medicines      [id number] - [place], [place] - [address] AT [address] [place] [address]  [address] [place] [address] [address]    Hours:  24-hours Phone:  [phone]     TEGADERM + PAD 3-1/2\"X4\" Misc                Primary Care Provider Office Phone # Fax #    [facility] [phone] [address]  [place] [address]        Equal Access to Services     [facility] JAYNA AH: Hadii peyton Cabrera, waaxda luqadaha, qaybta kaalmada quanyadenise, igor mix. So [facility] [phone].    ATENCIÓN: Si habla español, tiene a shrestha disposición servicios gratuitos de " "chrissuyapa pereza. Anabelle al [phone].    We comply with applicable federal civil rights laws and [place] laws. We do not discriminate on the basis of race, color, national origin, age, disability, sex, sexual orientation, or gender identity.            Thank you!     Thank you for choosing [facility]  for your care. Our goal is always to provide you with excellent care. Hearing back from our patients is one way we can continue to improve our services. Please take a few minutes to complete the written survey that you may receive in the mail after your visit with us. Thank you!             Your Updated Medication List - Protect others around you: Learn how to safely use, store and throw away your medicines at www.disposemymeds.org.          This list is accurate as of 8/16/18  1:41 PM.  Always use your most recent med list.                   Brand Name Dispense Instructions for use Diagnosis    aspirin 81 MG tablet      Take 81 mg by mouth daily        carvedilol 3.125 MG tablet    COREG    180 tablet    Take 1 tablet (3.125 mg) by mouth 2 times daily (with meals)        gabapentin 600 MG tablet    NEURONTIN    270 tablet    Take 1 tablet (600 mg) by mouth 3 times daily    Phantom limb pain (H)       ipratropium 0.06 % spray    ATROVENT    3 Box    Spray 2 sprays into both nostrils 4 times daily as needed for rhinitis    Chronic vasomotor rhinitis       phenazopyridine 100 MG tablet    PYRIDIUM    90 tablet    Take 1 tablet (100 mg) by mouth 3 times daily as needed for urinary tract discomfort    Urinary tract infection without hematuria, site unspecified       sulfamethoxazole-trimethoprim 800-160 MG per tablet    BACTRIM DS/SEPTRA DS    14 tablet    Take 1 tablet by mouth 2 times daily    Ureteral stone       TEGADERM + PAD 3-1/2\"X4\" Misc     6 each    1 Application every 3 days    Skin tear of lower leg without complication, initial encounter       torsemide 20 MG tablet    DEMADEX     Take " 20 mg by mouth daily

## 2018-08-16 NOTE — NURSING NOTE
Shakila had me call to cancel Surgery per Elvia Suarez due to Anemia.  I called [facility] and cancelled and they will contact Dr. Morales's office.    Lauren CLAYTON MA

## 2018-08-16 NOTE — PROGRESS NOTES
[address] [address]  [phone]  Dept: [phone]    PRE-OP EVALUATION:  Today's date: 2018    Antonio Ramirez (: 1945) presents for pre-operative evaluation assessment as requested by Dr. Howard.  He requires evaluation and anesthesia risk assessment prior to undergoing surgery/procedure for treatment of Right Hip pain .    Proposed Surgery/ Procedure: Total Hip Replacement  Date of Surgery/ Procedure: 18  Time of Surgery/ Procedure: 8:00 am  Hospital/Surgical Facility: [facility]  Fax number for surgical facility: In [facility]  Primary Physician: Main Hardy  Type of Anesthesia Anticipated: General    Patient has a Health Care Directive or Living Will:  NO    1. YES - Do you have a history of heart attack, stroke, stent, bypass or surgery on an artery in the head, neck, heart or legs?  2. NO - Do you ever have any pain or discomfort in your chest?  3. YES - Do you have a history of  Heart Failure?  4. NO - Are you troubled by shortness of breath when: walking on the level, up a slight hill or at night?  5. NO - Do you currently have a cold, bronchitis or other respiratory infection?  6. NO - Do you have a cough, shortness of breath or wheezing?  7. YES - Do you sometimes get pains in the calves of your legs when you walk?  8. NO - Do you or anyone in your family have previous history of blood clots?  9. NO - Do you or does anyone in your family have a serious bleeding problem such as prolonged bleeding following surgeries or cuts?  10. NO - Have you ever had problems with anemia or been told to take iron pills?  11. NO - Have you had any abnormal blood loss such as black, tarry or bloody stools, or abnormal vaginal bleeding?  12. YES - Have you ever had a blood transfusion?  13. NO - Have you or any of your relatives ever had problems with anesthesia?  14. NO - Do you have sleep apnea, excessive snoring or daytime drowsiness?  15. NO - Do you have  any prosthetic heart valves?  16. YES - Do you have prosthetic joints?  17. NO - Is there any chance that you may be pregnant?      HPI:     HPI related to upcoming procedure: ***      {. Problems:620024}    MEDICAL HISTORY:     Patient Active Problem List    Diagnosis Date Noted     Calculi, ureter 08/14/2018     Priority: Medium     Sepsis (H) 07/23/2018     Priority: Medium     Malabsorption of iron 06/08/2018     Priority: Medium     Benign essential hypertension 04/27/2018     Priority: Medium     S/P craniotomy 04/07/2018     Priority: Medium     Subdural hematoma (H) 04/07/2018     Priority: Medium     Paroxysmal a-fib (H) 11/29/2017     Priority: Medium     Thrombocytopenia -- suspect related to alcohol use 11/29/2017     Priority: Medium     Chronic congestive heart failure, unspecified congestive heart failure type (H) 06/30/2017     Priority: Medium     Prostate cancer -- S/P Radiative Seed implants in 2000 (no problems since) 05/24/2017     Priority: Medium     Antiphospholipid Jina Syn, Hypercoag (DVT, PE) -- has IVC filt and Warfarin 05/24/2017     Priority: Medium     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 (H) 05/24/2017     Priority: Medium     Thoracic aortic aneurysm without rupture (H) 05/24/2017     Priority: Medium     Alcohol abuse 09/24/2014     Priority: Medium     Problem list name updated by automated process. Provider to review       Alcoholic cirrhosis of liver (H) 09/24/2014     Priority: Medium     Chronic kidney disease, stage III (moderate) 09/24/2014     Priority: Medium     Physical deconditioning 09/24/2014     Priority: Medium      Past Medical History:   Diagnosis Date     Alcoholism (H)      Amputated left leg (H)      Anemia      Anticardiolipin antibody syndrome (H)      Antiplatelet or antithrombotic long-term use      Aortic stenosis      Arthritis      Balance problem      Cardiomyopathy (H)      Coagulation disorder (H)     cardiolipinantibody syndrome      Coronary artery disease      Gastro-oesophageal reflux disease      Heart attack 2007    apparently arrested, cardiac X5     Hiatal hernia      Hypercholesterolemia      Hypertension      Left foot drop      Liver disease     alcoholic liver disease, alcoholic cirrohsosis, portal hypertension     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)     ?When diagnosed, patient not aware of this     Moderate mitral regurgitation      Monoclonal gammopathy      Neuropathy      Noninfectious ileitis     diverticulitis of colon     PE (pulmonary embolism) 2006     Prostate cancer (H) 2000    Treated with radiation (seed implants in 2000) -- no problems since     Renal disease     kidney stones     Syncope      Thoracic aortic aneurysm, without rupture      Thrombocytopenia (H)      Thrombosis of leg      Urethral stricture      Past Surgical History:   Procedure Laterality Date     AMPUTATE LEG BELOW KNEE Left 04/2014    related to gangrene, started as foot ulcer related to neuropathy     AMPUTATE LEG BELOW KNEE Left 12/4/2017    Procedure: AMPUTATE LEG BELOW KNEE;  Exploration of Left Leg Below Knee Amputation Stump with Ligation of Bleeders.;  Surgeon: Leandro Arreola MD;  Location:  OR     APPENDECTOMY       APPLY WOUND VAC Left 12/6/2017    Procedure: APPLY WOUND VAC;;  Surgeon: Saima Howard MD;  [facility]:  [facility]     ARTHROPLASTY KNEE Right 9/19/2014    Procedure: ARTHROPLASTY KNEE;  Surgeon: Saima Howard MD;  Location:  OR     CARDIAC SURGERY      CABG     CHOLECYSTECTOMY       COLONOSCOPY       COMBINED CYSTOSCOPY, RETROGRADES, EXCHANGE STENT URETER(S) Left 7/24/2018    Procedure: COMBINED CYSTOSCOPY, RETROGRADES, EXCHANGE STENT URETER(S);  CYSTOSCOPY, BILATERAL RETROGRADES, BILATERAL URETERAL STENT EXCHANGE ;  Surgeon: Matt Cervantes MD;  Location:  OR     CRANIOTOMY Right 4/7/2018    Procedure: CRANIOTOMY;  Right Craniotomy For Subdural Hematoma;  Surgeon: Jono Issa  MD Bird;  Location:  OR     CYSTOSCOPY, DILATE URETHRA, COMBINED N/A 10/12/2016    Procedure: COMBINED CYSTOSCOPY, DILATE URETHRA;  Surgeon: Dimitry Bello MD;  Location:  OR     CYSTOSCOPY, REMOVE STENT(S), COMBINED Right 7/24/2018    Procedure: COMBINED CYSTOSCOPY, REMOVE STENT(S);;  Surgeon: Jose Hunter MD;  Location:  OR     ENDOSCOPIC STRIPPING VEIN(S)       esophagogastroduodenoscopy       EYE SURGERY      cataracts     GENITOURINARY SURGERY      Prostate Seen Implants     HERNIA REPAIR      Umbilical     INCISION AND DRAINAGE LOWER EXTREMITY, COMBINED Left 12/1/2017    Procedure: COMBINED INCISION AND DRAINAGE LOWER EXTREMITY;  INCISION AND DRAINAGE OF LEFT BELOW KNEE AMPUTATION ABSCESS, WOUND VAC PLACEMENT;  Surgeon: Saima Howard MD;  Location:  OR     IR IVC FILTER PLACEMENT       IRRIGATION AND DEBRIDEMENT LOWER EXTREMITY, COMBINED Left 12/6/2017    Procedure: COMBINED IRRIGATION AND DEBRIDEMENT LOWER EXTREMITY;  IRRIGATION AND DEBRIDEMENT OF LEFT BELOW KNEE AMPUTATION SITE WITH WOUND VAC REPLACEMENT;  Surgeon: Saima Howard MD;  Location:  [facility]     IRRIGATION AND DEBRIDEMENT LOWER EXTREMITY, COMBINED Left 12/8/2017    Procedure: COMBINED IRRIGATION AND DEBRIDEMENT LOWER EXTREMITY;  IRRIGATION AND DEBRIDEMENT OF LEFT BELOW THE KNEE AMPUTATION AND SHORTENING OF THE TIBIA WITH WOUND CLOSURE;  Surgeon: Saima Howard MD;  Location:  OR     LASER HOLMIUM LITHOTRIPSY URETER(S), INSERT STENT, COMBINED Bilateral 6/28/2018    Procedure: COMBINED CYSTOSCOPY, URETEROSCOPY, LASER HOLMIUM LITHOTRIPSY URETER(S), INSERT STENT;  CYSTOSCOPY, BILATERAL URETEROSCOPY,  HOLMIUM LASER LITHOTRIPSY, BILATERAL STENT PLACEMENT, STONE BASKETING;  Surgeon: Jose Hunter MD;  Location:  OR     LASER HOLMIUM LITHOTRIPSY URETER(S), INSERT STENT, COMBINED Left 8/14/2018    Procedure: COMBINED CYSTOSCOPY, URETEROSCOPY, LASER HOLMIUM LITHOTRIPSY URETER(S), INSERT STENT;  CYSTOSCOPY, LEFT  "URETEROSCOPY, LEFT LASER HOLMIUM LITHOTRIPSY URETER(S) REMOVAL BILATERAL STENTS;  Surgeon: Jose Hunter MD;  Location: [facility]     ORTHOPEDIC SURGERY      (R) knee scope     ORTHOPEDIC SURGERY      total hip      ORTHOPEDIC SURGERY      elbow surgery     Current Outpatient Prescriptions   Medication Sig Dispense Refill     aspirin 81 MG tablet Take 81 mg by mouth daily       carvedilol (COREG) 3.125 MG tablet Take 1 tablet (3.125 mg) by mouth 2 times daily (with meals) 180 tablet 1     fluticasone (FLONASE) 50 MCG/ACT spray Spray 1 spray into both nostrils 2 times daily        gabapentin (NEURONTIN) 600 MG tablet Take 1 tablet (600 mg) by mouth 3 times daily 270 tablet 3     ipratropium (ATROVENT) 0.06 % spray Spray 2 sprays into both nostrils 4 times daily as needed for rhinitis 3 Box 3     phenazopyridine (PYRIDIUM) 100 MG tablet Take 1 tablet (100 mg) by mouth 3 times daily as needed for urinary tract discomfort 90 tablet 3     rivaroxaban ANTICOAGULANT (XARELTO) 10 MG TABS tablet Take 1 tablet (10 mg) by mouth daily (with dinner)       sulfamethoxazole-trimethoprim (BACTRIM DS/SEPTRA DS) 800-160 MG per tablet Take 1 tablet by mouth 2 times daily 14 tablet 0     torsemide (DEMADEX) 20 MG tablet Take 20 mg by mouth daily       OTC products: None, except as noted above    Allergies   Allergen Reactions     Ciprofloxacin Itching     Severe itching \"like ants were crawling\"     Hmg-Coa-R Inhibitors Other (See Comments)     Rhabdomyolysis occurred within a couple days      Latex Allergy: NO    Social History   Substance Use Topics     Smoking status: Never Smoker     Smokeless tobacco: Never Used     Alcohol use Yes      Comment: quit 10/2015; now 5-6 Vodkas/night     History   Drug Use No       REVIEW OF SYSTEMS:   {ROS Preop Choices:364881}    EXAM:   There were no vitals taken for this visit.  {EXAM Preop Choices:681049}    DIAGNOSTICS:   {DIAGNOSTIC FOR PREOP:745935}    Recent Labs   Lab Test  07/25/18   0841 " " 07/24/18   0815   05/24/18   0700   05/18/18   1641   04/11/18   0807   HGB  10.3*  10.0*   < >  9.0*   < >  10.6*   < >  10.5*   PLT  76*  66*   < >  68*   < >  168   < >  156   INR   --    --    --   1.68*   --    --    --   1.39*   NA  137  135   < >  138   < >  140   < >  140   POTASSIUM  4.2  4.1   < >  4.0   < >  4.3   < >  2.9*   CR  1.40*  1.67*   < >  1.57*   < >  1.00   < >  0.88   A1C   --    --    --    --    --   4.6   --    --     < > = values in this interval not displayed.        IMPRESSION:   {PREOP REASONS:178763::\"Reason for surgery/procedure: ***\",\"Diagnosis/reason for consult: ***\"}    The proposed surgical procedure is considered {HIGH=major cardiovascular or procedures requiring prolonged anesthesia >4 hours or large fluid shifts;    INTERMEDIATE=abdominal, most orthopedic and intrathoracic surgery; LOW= endoscopy, cataract and breast surgery:851493} risk.    REVISED CARDIAC RISK INDEX  The patient has the following serious cardiovascular risks for perioperative complications such as (MI, PE, VFib and 3  AV Block):  {PREOP REVISED CARDIAC INDEX (RCI):202090:p:\"No serious cardiac risks\"}  INTERPRETATION: {REVISED CARDIAC RISK INTERPRETATION:809590}    The patient has the following additional risks for perioperative complications:  {Additional perioperative risks:982545:p:\"No identified additional risks\"}      ICD-10-CM    1. Preop general physical exam Z01.818        RECOMMENDATIONS:     {IMPORTANT - Conditions - complete carefully!!:974254}    {IMPORTANT - Medications:777615::\"--Patient is to take all scheduled medications on the day of surgery EXCEPT for modifications listed below.\"}    {IMPORTANT - Approval:441002:p:\"APPROVAL GIVEN to proceed with proposed procedure, without further diagnostic evaluation\"}       Signed Electronically by: DIPAK Schneider CNP    Copy of this evaluation report is provided to requesting physician.    [facility] Preop Guidelines    Revised Cardiac Risk " Index

## 2018-08-17 LAB
ALBUMIN SERPL-MCNC: 3.1 G/DL (ref 3.4–5)
ALP SERPL-CCNC: 83 U/L (ref 40–150)
ALT SERPL W P-5'-P-CCNC: 12 U/L (ref 0–70)
ANION GAP SERPL CALCULATED.3IONS-SCNC: 6 MMOL/L (ref 3–14)
AST SERPL W P-5'-P-CCNC: 30 U/L (ref 0–45)
BILIRUB SERPL-MCNC: 0.6 MG/DL (ref 0.2–1.3)
BUN SERPL-MCNC: 25 MG/DL (ref 7–30)
CALCIUM SERPL-MCNC: 8.1 MG/DL (ref 8.5–10.1)
CHLORIDE SERPL-SCNC: 102 MMOL/L (ref 94–109)
CO2 SERPL-SCNC: 24 MMOL/L (ref 20–32)
CREAT SERPL-MCNC: 2 MG/DL (ref 0.66–1.25)
FERRITIN SERPL-MCNC: 188 NG/ML (ref 26–388)
GFR SERPL CREATININE-BSD FRML MDRD: 33 ML/MIN/1.7M2
GLUCOSE SERPL-MCNC: 66 MG/DL (ref 70–99)
IRON SATN MFR SERPL: 25 % (ref 15–46)
IRON SERPL-MCNC: 52 UG/DL (ref 35–180)
POTASSIUM SERPL-SCNC: 5.4 MMOL/L (ref 3.4–5.3)
PROT SERPL-MCNC: 6.4 G/DL (ref 6.8–8.8)
SODIUM SERPL-SCNC: 132 MMOL/L (ref 133–144)
TIBC SERPL-MCNC: 206 UG/DL (ref 240–430)

## 2018-08-17 NOTE — PROGRESS NOTES
SUBJECTIVE:   Antonio Ramirez is a 73 year old male who presents to clinic today for the following health issues:      Chief Complaint   Patient presents with     RECHECK     Anemia   Mr aRmirez is in clinic today to get clearance for a scheduled right hip arthroplasty.   After collaboration with his PCP the surgery will need to be postponed until after his anemia is rechecked and treated accordingly.  He did have 2 iron infusions in July .   Mr Ramirez has multiple risk factors for elective surgery as proposed:    Past Medical History:   Diagnosis Date     Alcoholism (H)      Amputated left leg (H)      Anemia      Anticardiolipin antibody syndrome (H)      Antiplatelet or antithrombotic long-term use      Aortic stenosis      Arthritis      Balance problem      Cardiomyopathy (H)      Coagulation disorder (H)     cardiolipinantibody syndrome     Coronary artery disease      Gastro-oesophageal reflux disease      Heart attack 2007    apparently arrested, cardiac X5     Hiatal hernia      Hypercholesterolemia      Hypertension      Left foot drop      Liver disease     alcoholic liver disease, alcoholic cirrohsosis, portal hypertension     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)     ?When diagnosed, patient not aware of this     Moderate mitral regurgitation      Monoclonal gammopathy      Neuropathy      Noninfectious ileitis     diverticulitis of colon     PE (pulmonary embolism) 2006     Prostate cancer (H) 2000    Treated with radiation (seed implants in 2000) -- no problems since     Renal disease     kidney stones     Syncope      Thoracic aortic aneurysm, without rupture      Thrombocytopenia (H)      Thrombosis of leg      Urethral stricture      Past Surgical History:   Procedure Laterality Date     AMPUTATE LEG BELOW KNEE Left 04/2014    related to gangrene, started as foot ulcer related to neuropathy     AMPUTATE LEG BELOW KNEE Left 12/4/2017    Procedure: AMPUTATE LEG BELOW KNEE;   Exploration of Left Leg Below Knee Amputation Stump with Ligation of Bleeders.;  Surgeon: Leandro Arreola MD;  Location:  OR     APPENDECTOMY       APPLY WOUND VAC Left 12/6/2017    Procedure: APPLY WOUND VAC;;  Surgeon: Saima Howard MD;  [facility]:  [facility]     ARTHROPLASTY KNEE Right 9/19/2014    Procedure: ARTHROPLASTY KNEE;  Surgeon: Saima Howard MD;  Location:  OR     CARDIAC SURGERY      CABG     CHOLECYSTECTOMY       COLONOSCOPY       COMBINED CYSTOSCOPY, RETROGRADES, EXCHANGE STENT URETER(S) Left 7/24/2018    Procedure: COMBINED CYSTOSCOPY, RETROGRADES, EXCHANGE STENT URETER(S);  CYSTOSCOPY, BILATERAL RETROGRADES, BILATERAL URETERAL STENT EXCHANGE ;  Surgeon: Matt Cervantes MD;  Location:  OR     CRANIOTOMY Right 4/7/2018    Procedure: CRANIOTOMY;  Right Craniotomy For Subdural Hematoma;  Surgeon: Jono Issa MD;  Location:  OR     CYSTOSCOPY, DILATE URETHRA, COMBINED N/A 10/12/2016    Procedure: COMBINED CYSTOSCOPY, DILATE URETHRA;  Surgeon: Dimitry Bello MD;  Location:  OR     CYSTOSCOPY, REMOVE STENT(S), COMBINED Right 7/24/2018    Procedure: COMBINED CYSTOSCOPY, REMOVE STENT(S);;  Surgeon: Jose Hunter MD;  Location:  OR     ENDOSCOPIC STRIPPING VEIN(S)       esophagogastroduodenoscopy       EYE SURGERY      cataracts     GENITOURINARY SURGERY      Prostate Seen Implants     HERNIA REPAIR      Umbilical     INCISION AND DRAINAGE LOWER EXTREMITY, COMBINED Left 12/1/2017    Procedure: COMBINED INCISION AND DRAINAGE LOWER EXTREMITY;  INCISION AND DRAINAGE OF LEFT BELOW KNEE AMPUTATION ABSCESS, WOUND VAC PLACEMENT;  Surgeon: Saima Howard MD;  Location:  OR     IR IVC FILTER PLACEMENT       IRRIGATION AND DEBRIDEMENT LOWER EXTREMITY, COMBINED Left 12/6/2017    Procedure: COMBINED IRRIGATION AND DEBRIDEMENT LOWER EXTREMITY;  IRRIGATION AND DEBRIDEMENT OF LEFT BELOW KNEE AMPUTATION SITE WITH WOUND VAC REPLACEMENT;  Surgeon: Saima Howard  "MD;  Location:  [facility]     IRRIGATION AND DEBRIDEMENT LOWER EXTREMITY, COMBINED Left 12/8/2017    Procedure: COMBINED IRRIGATION AND DEBRIDEMENT LOWER EXTREMITY;  IRRIGATION AND DEBRIDEMENT OF LEFT BELOW THE KNEE AMPUTATION AND SHORTENING OF THE TIBIA WITH WOUND CLOSURE;  Surgeon: Saima Howard MD;  Location:  OR     LASER HOLMIUM LITHOTRIPSY URETER(S), INSERT STENT, COMBINED Bilateral 6/28/2018    Procedure: COMBINED CYSTOSCOPY, URETEROSCOPY, LASER HOLMIUM LITHOTRIPSY URETER(S), INSERT STENT;  CYSTOSCOPY, BILATERAL URETEROSCOPY,  HOLMIUM LASER LITHOTRIPSY, BILATERAL STENT PLACEMENT, STONE BASKETING;  Surgeon: Jose Hunter MD;  Location:  OR     LASER HOLMIUM LITHOTRIPSY URETER(S), INSERT STENT, COMBINED Left 8/14/2018    Procedure: COMBINED CYSTOSCOPY, URETEROSCOPY, LASER HOLMIUM LITHOTRIPSY URETER(S), INSERT STENT;  CYSTOSCOPY, LEFT URETEROSCOPY, LEFT LASER HOLMIUM LITHOTRIPSY URETER(S) REMOVAL BILATERAL STENTS;  Surgeon: Jose Hunter MD;  Location:  OR     ORTHOPEDIC SURGERY      (R) knee scope     ORTHOPEDIC SURGERY      total hip      ORTHOPEDIC SURGERY      elbow surgery       Current Outpatient Prescriptions   Medication     aspirin 81 MG tablet     carvedilol (COREG) 3.125 MG tablet     gabapentin (NEURONTIN) 600 MG tablet     ipratropium (ATROVENT) 0.06 % spray     phenazopyridine (PYRIDIUM) 100 MG tablet     sulfamethoxazole-trimethoprim (BACTRIM DS/SEPTRA DS) 800-160 MG per tablet     torsemide (DEMADEX) 20 MG tablet     Transparent Dressings (TEGADERM + PAD 3-1/2\"X4\") MISC     No current facility-administered medications for this visit.        Additional history: as documented    Social History     Social History     Marital status:      Spouse name: N/A     Number of children: 2     Years of education: N/A     Occupational History     Retired       Social History Main Topics     Smoking status: Never Smoker     Smokeless tobacco: Never Used     Alcohol use Yes " "     Comment: quit 10/2015; now 5-6 Vodkas/night     Drug use: No     Sexual activity: Not Currently     Other Topics Concern     None     Social History Narrative    , 2 children, retired . He does not have a living will but desires full code.  (last updated 11/29/2017)        Reviewed and updated as needed this visit by Provider       OBJECTIVE:  BP 99/58 (BP Location: Right arm, Patient Position: Chair, Cuff Size: Adult Regular)  Pulse 64  Temp 98.1  F (36.7  C) (Tympanic)  Ht 6' 2\" (1.88 m)  Wt 238 lb 9.6 oz (108.2 kg)  SpO2 94%  BMI 30.63 kg/m2   Physical Exam   Constitutional:   Consti; appears fatigued , pallor, alert   HENT:   Head: Normocephalic.   Pulmonary/Chest: Breath sounds normal.   Musculoskeletal:   Left ATK ampution, significant edema of left leg stump and of right lower extremity   Neurological: He is alert.   Skin:   Has several open lesions of right shin , no purulence or surrounding erythema; areas redressed     Results for orders placed or performed in visit on 08/16/18   Ferritin   Result Value Ref Range    Ferritin 188 26 - 388 ng/mL   CBC with platelets and differential   Result Value Ref Range    WBC 5.6 4.0 - 11.0 10e9/L    RBC Count 2.95 (L) 4.4 - 5.9 10e12/L    Hemoglobin 9.1 (L) 13.3 - 17.7 g/dL    Hematocrit 28.2 (L) 40.0 - 53.0 %    MCV 96 78 - 100 fl    MCH 30.8 26.5 - 33.0 pg    MCHC 32.3 31.5 - 36.5 g/dL    RDW 25.9 (H) 10.0 - 15.0 %    Platelet Count 110 (L) 150 - 450 10e9/L    Diff Method Automated Method     % Neutrophils 72.3 %    % Lymphocytes 11.7 %    % Monocytes 12.6 %    % Eosinophils 3.0 %    % Basophils 0.4 %    Absolute Neutrophil 4.1 1.6 - 8.3 10e9/L    Absolute Lymphocytes 0.7 (L) 0.8 - 5.3 10e9/L    Absolute Monocytes 0.7 0.0 - 1.3 10e9/L    Absolute Eosinophils 0.2 0.0 - 0.7 10e9/L    Absolute Basophils 0.0 0.0 - 0.2 10e9/L   Comprehensive metabolic panel (BMP + Alb, Alk Phos, ALT, AST, Total. Bili, TP)   Result Value Ref Range    Sodium " 132 (L) 133 - 144 mmol/L    Potassium 5.4 (H) 3.4 - 5.3 mmol/L    Chloride 102 94 - 109 mmol/L    Carbon Dioxide 24 20 - 32 mmol/L    Anion Gap 6 3 - 14 mmol/L    Glucose 66 (L) 70 - 99 mg/dL    Urea Nitrogen 25 7 - 30 mg/dL    Creatinine 2.00 (H) 0.66 - 1.25 mg/dL    GFR Estimate 33 (L) >60 mL/min/1.7m2    GFR Estimate If Black 40 (L) >60 mL/min/1.7m2    Calcium 8.1 (L) 8.5 - 10.1 mg/dL    Bilirubin Total 0.6 0.2 - 1.3 mg/dL    Albumin 3.1 (L) 3.4 - 5.0 g/dL    Protein Total 6.4 (L) 6.8 - 8.8 g/dL    Alkaline Phosphatase 83 40 - 150 U/L    ALT 12 0 - 70 U/L    AST 30 0 - 45 U/L   Iron and iron binding capacity   Result Value Ref Range    Iron 52 35 - 180 ug/dL    Iron Binding Cap 206 (L) 240 - 430 ug/dL    Iron Saturation Index 25 15 - 46 %     Assessment:  1  Other iron deficiency anemia  D50.8  2  Skin tear of lower leg without complication , initial encounter  3. Peripheral edema    Plan:     Your kidney function is decreased somewhat and your electrolytes are slightly off.  Also the hemoglobin is lower than it was 3 weeks ago despite the iron infusion .      I've spoken with Dr Hardy about your results this morning and he would like to see you next week.  I will ask our scheduling staff to set up that appointment with you.      And Dr Hardy does not think you need to go to the lymphedema clinic. He suggested that you take twice the dose of demadex 20mg for 3 days .  That would be 40mg for 3 days.      Please call me if you have any questions   And I will also try to reach you sometime later today    [id number]  CT: 35 min      Elvia QUESADA

## 2018-08-18 LAB
APPEARANCE STONE: NORMAL
COMPN STONE: NORMAL
NUMBER STONE: 4
SIZE STONE: NORMAL MM
WT STONE: 48 MG

## 2018-08-20 NOTE — PROGRESS NOTES
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  He lives in a private home with his wife.  He presents in follow-up after being seen by a nurse practitioner in this clinic in anticipation of undergoing right total hip arthroplasty.  He was found to have incompletely healed skin tears on his right leg and right arm as well as worsening anemia and worsening renal function at the time of that visit.  Surgery was canceled and he was referred back to see his primary care provider for evaluation of the aforementioned problems.  He does endorse feeling mildly fatigued lately.  He also continues to have gross hematuria and has a follow-up appointment with his urologist.  He did have a stone removal procedure and currently has a ureteral stent in place.    Problem list and histories reviewed & adjusted, as indicated.  Additional history: as documented    Patient Active Problem List   Diagnosis     Alcohol abuse     Alcoholic cirrhosis of liver (H)     Chronic kidney disease, stage III (moderate)     Physical deconditioning     Prostate cancer -- S/P Radiative Seed implants in 2000 (no problems since)     Antiphospholipid Jina Syn, Hypercoag (DVT, PE) -- has IVC filt and Warfarin     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 (H)     Thoracic aortic aneurysm without rupture (H)     Chronic congestive heart failure, unspecified congestive heart failure type (H)     Paroxysmal a-fib (H)     Thrombocytopenia --  suspect related to alcohol use     S/P craniotomy     Subdural hematoma (H)     Benign essential hypertension     Malabsorption of iron     Sepsis (H)     Calculi, ureter     Past Surgical History:   Procedure Laterality Date     AMPUTATE LEG BELOW KNEE Left 04/2014    related to gangrene, started as foot ulcer related to neuropathy     AMPUTATE LEG BELOW KNEE Left 12/4/2017    Procedure: AMPUTATE LEG BELOW KNEE;  Exploration of Left Leg Below Knee Amputation Stump with Ligation of Bleeders.;  Surgeon: Leandro Arreola MD;  Location:  OR     APPENDECTOMY       APPLY WOUND VAC Left 12/6/2017    Procedure: APPLY WOUND VAC;;  Surgeon: Saima Howard MD;  Location:  SD     ARTHROPLASTY KNEE Right 9/19/2014    Procedure: ARTHROPLASTY KNEE;  Surgeon: Saima Howard MD;  Location:  OR     CARDIAC SURGERY      CABG     CHOLECYSTECTOMY       COLONOSCOPY       COMBINED CYSTOSCOPY, RETROGRADES, EXCHANGE STENT URETER(S) Left 7/24/2018    Procedure: COMBINED CYSTOSCOPY, RETROGRADES, EXCHANGE STENT URETER(S);  CYSTOSCOPY, BILATERAL RETROGRADES, BILATERAL URETERAL STENT EXCHANGE ;  Surgeon: Matt Cervantes MD;  Location:  OR     CRANIOTOMY Right 4/7/2018    Procedure: CRANIOTOMY;  Right Craniotomy For Subdural Hematoma;  Surgeon: Jono Issa MD;  Location:  OR     CYSTOSCOPY, DILATE URETHRA, COMBINED N/A 10/12/2016    Procedure: COMBINED CYSTOSCOPY, DILATE URETHRA;  Surgeon: Dimitry Bello MD;  Location:  OR     CYSTOSCOPY, REMOVE STENT(S), COMBINED Right 7/24/2018    Procedure: COMBINED CYSTOSCOPY, REMOVE STENT(S);;  Surgeon: Jose Hunter MD;  Location:  OR     ENDOSCOPIC STRIPPING VEIN(S)       esophagogastroduodenoscopy       EYE SURGERY      cataracts     GENITOURINARY SURGERY      Prostate Seen Implants     HERNIA REPAIR      Umbilical     INCISION AND DRAINAGE LOWER EXTREMITY, COMBINED Left 12/1/2017    Procedure: COMBINED INCISION AND DRAINAGE LOWER EXTREMITY;   INCISION AND DRAINAGE OF LEFT BELOW KNEE AMPUTATION ABSCESS, WOUND VAC PLACEMENT;  Surgeon: Saima Howard MD;  Location:  OR     IR IVC FILTER PLACEMENT       IRRIGATION AND DEBRIDEMENT LOWER EXTREMITY, COMBINED Left 2017    Procedure: COMBINED IRRIGATION AND DEBRIDEMENT LOWER EXTREMITY;  IRRIGATION AND DEBRIDEMENT OF LEFT BELOW KNEE AMPUTATION SITE WITH WOUND VAC REPLACEMENT;  Surgeon: Saima Howard MD;  Location:  SD     IRRIGATION AND DEBRIDEMENT LOWER EXTREMITY, COMBINED Left 2017    Procedure: COMBINED IRRIGATION AND DEBRIDEMENT LOWER EXTREMITY;  IRRIGATION AND DEBRIDEMENT OF LEFT BELOW THE KNEE AMPUTATION AND SHORTENING OF THE TIBIA WITH WOUND CLOSURE;  Surgeon: Saima Howard MD;  Location:  OR     LASER HOLMIUM LITHOTRIPSY URETER(S), INSERT STENT, COMBINED Bilateral 2018    Procedure: COMBINED CYSTOSCOPY, URETEROSCOPY, LASER HOLMIUM LITHOTRIPSY URETER(S), INSERT STENT;  CYSTOSCOPY, BILATERAL URETEROSCOPY,  HOLMIUM LASER LITHOTRIPSY, BILATERAL STENT PLACEMENT, STONE BASKETING;  Surgeon: Jose Hunter MD;  Location:  OR     LASER HOLMIUM LITHOTRIPSY URETER(S), INSERT STENT, COMBINED Left 2018    Procedure: COMBINED CYSTOSCOPY, URETEROSCOPY, LASER HOLMIUM LITHOTRIPSY URETER(S), INSERT STENT;  CYSTOSCOPY, LEFT URETEROSCOPY, LEFT LASER HOLMIUM LITHOTRIPSY URETER(S) REMOVAL BILATERAL STENTS;  Surgeon: Jose Hunter MD;  Location:  OR     ORTHOPEDIC SURGERY      (R) knee scope     ORTHOPEDIC SURGERY      total hip      ORTHOPEDIC SURGERY      elbow surgery       Social History   Substance Use Topics     Smoking status: Never Smoker     Smokeless tobacco: Never Used     Alcohol use Yes      Comment: quit 10/2015; now 5-6 Vodkas/night     Family History   Problem Relation Age of Onset     Lung Cancer Mother      Heart Failure Father       age 87         Current Outpatient Prescriptions   Medication Sig Dispense Refill     aspirin 81 MG tablet Take 81 mg by  "mouth daily       carvedilol (COREG) 3.125 MG tablet Take 1 tablet (3.125 mg) by mouth 2 times daily (with meals) 180 tablet 1     gabapentin (NEURONTIN) 600 MG tablet Take 1 tablet (600 mg) by mouth 3 times daily 270 tablet 3     ipratropium (ATROVENT) 0.06 % spray Spray 2 sprays into both nostrils 4 times daily as needed for rhinitis 3 Box 3     phenazopyridine (PYRIDIUM) 100 MG tablet Take 1 tablet (100 mg) by mouth 3 times daily as needed for urinary tract discomfort 90 tablet 3     sulfamethoxazole-trimethoprim (BACTRIM DS/SEPTRA DS) 800-160 MG per tablet Take 1 tablet by mouth 2 times daily 14 tablet 0     torsemide (DEMADEX) 20 MG tablet Take 20 mg by mouth daily       Transparent Dressings (TEGADERM + PAD 3-1/2\"X4\") MISC 1 Application every 3 days 6 each 0     Allergies   Allergen Reactions     Ciprofloxacin Itching     Severe itching \"like ants were crawling\"     Hmg-Coa-R Inhibitors Other (See Comments)     Rhabdomyolysis occurred within a couple days       Reviewed and updated as needed this visit by clinical staff       Reviewed and updated as needed this visit by Provider         ROS:  Constitutional, HEENT, cardiovascular, pulmonary, gi and gu systems are negative, except as otherwise noted.    OBJECTIVE:     /63 (BP Location: Right arm, Patient Position: Chair, Cuff Size: Adult Regular)  Pulse 61  Temp 98.2  F (36.8  C) (Tympanic)  Ht 6' 2\" (1.88 m)  Wt 232 lb 12.8 oz (105.6 kg)  SpO2 97%  BMI 29.89 kg/m2  Body mass index is 29.89 kg/(m^2).  General: This is an unchanged appearing, chronically ill-appearing man in no acute distress who appears quite comfortable at rest.  He does have edema in the distal right lower extremity with a healing skin tear overlying the anterior distal lower extremity as well as a skin tear on the right upper extremity distally.  There is no evidence of infection associated with the skin tears.      Follow-up basic metabolic panel/CBC is " pending    ASSESSMENT/PLAN:           ICD-10-CM    1. Acute renal failure, unspecified acute renal failure type (H) N17.9 Basic metabolic panel   2. Anemia, unspecified type D64.9 CBC with platelets     ONC/HEME ADULT REFERRAL   3. Lymphedema I89.0 LYMPHEDEMA THERAPY REFERRAL   4. Laceration of right lower extremity, subsequent encounter S81.250Y    5. 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 (H) C83.39      This is a man who was recently hospitalized twice with severe sepsis for obstructing nephrolithiasis who recently had a nephrolithiasis removal procedure and demonstrated worsening renal failure at his recent visit.  Also, he has anemia and evaluation for reversible causes of his anemia demonstrated iron deficiency in May 2018.  He was referred for 2 iron infusions but his hemoglobin is actually worse as of last check.  My suspicion is that his worsening anemia might be related to his declining renal function.  He may be a candidate for an Aranesp injection to help address his mildly symptomatic anemia and to improve his preoperative status in anticipation of his right hip replacement surgery.  His anemia might also reflect possible bone marrow problems.  He does have a history of lymphoma in addition to ongoing alcohol use.  He was counseled to stop drinking any alcohol as alcohol is a bone marrow suppressing agent.  I do think he should visit with his hematologist to address the specific issue of his anemia in anticipation of his desired hip replacement surgery.  An Aranesp injection could be administered there or if his hematologist finds it necessary, a bone marrow aspirate could be obtained to further characterize his anemia.  His wife tells me that he did have a colonoscopy in the last few years and he was told by his gastroenterologist at that time in [place] that he would not need further colonoscopies in his life for screening purposes.  However, I do not have a copy of that report.  I did ask  his wife to try to obtain a copy of that report for my review since in May it did seem that there was an iron deficiency component to his anemia.  Finally, with respect to his wounds from his skin tear, he cannot undergo elective hip replacement until these heal.  I suspect that the lower extremity wound is healing slowly due to a large amount of edema in the distal right lower extremity.  Because of this, he was referred to lymphedema therapy since temporarily increasing the doses of his torsemide and the use of home compression hosiery has not been successful in addressing his persistent swelling.    I did ask that he return to see me in a two-week interval, hopefully by then he will have a chance to see Dr. Mcintyre in consultation regarding his anemia.  And also, hopefully he will have had a chance to see lymphedema therapy and there is some improvement in his lower extremity wound healing.    The total duration of this clinic visit was more than 35 minutes of face-to-face contact more than 50% of which was spent counseling and coordinating care regarding the above issues.    Main Hardy MD  [facility]

## 2018-08-22 ENCOUNTER — OFFICE VISIT (OUTPATIENT)
Dept: FAMILY MEDICINE | Facility: CLINIC | Age: 73
End: 2018-08-22
Payer: MEDICARE

## 2018-08-22 VITALS
HEART RATE: 61 BPM | OXYGEN SATURATION: 97 % | HEIGHT: 74 IN | TEMPERATURE: 98.2 F | BODY MASS INDEX: 29.88 KG/M2 | SYSTOLIC BLOOD PRESSURE: 122 MMHG | WEIGHT: 232.8 LBS | DIASTOLIC BLOOD PRESSURE: 63 MMHG

## 2018-08-22 DIAGNOSIS — S81.811D LACERATION OF RIGHT LOWER EXTREMITY, SUBSEQUENT ENCOUNTER: ICD-10-CM

## 2018-08-22 DIAGNOSIS — D64.9 ANEMIA, UNSPECIFIED TYPE: ICD-10-CM

## 2018-08-22 DIAGNOSIS — C83.398 DIFFUSE LARGE B-CELL LYMPHOMA OF EXTRANODAL SITE: ICD-10-CM

## 2018-08-22 DIAGNOSIS — I89.0 LYMPHEDEMA: ICD-10-CM

## 2018-08-22 DIAGNOSIS — N17.9 ACUTE RENAL FAILURE, UNSPECIFIED ACUTE RENAL FAILURE TYPE (H): Primary | ICD-10-CM

## 2018-08-22 LAB
ERYTHROCYTE [DISTWIDTH] IN BLOOD BY AUTOMATED COUNT: ABNORMAL % (ref 10–15)
HCT VFR BLD AUTO: 30 % (ref 40–53)
HGB BLD-MCNC: 9.9 G/DL (ref 13.3–17.7)
MCH RBC QN AUTO: 31.5 PG (ref 26.5–33)
MCHC RBC AUTO-ENTMCNC: 33 G/DL (ref 31.5–36.5)
MCV RBC AUTO: 96 FL (ref 78–100)
PLATELET # BLD AUTO: 121 10E9/L (ref 150–450)
RBC # BLD AUTO: 3.14 10E12/L (ref 4.4–5.9)
WBC # BLD AUTO: 4.7 10E9/L (ref 4–11)

## 2018-08-22 PROCEDURE — 85027 COMPLETE CBC AUTOMATED: CPT | Performed by: INTERNAL MEDICINE

## 2018-08-22 PROCEDURE — 80048 BASIC METABOLIC PNL TOTAL CA: CPT | Performed by: INTERNAL MEDICINE

## 2018-08-22 PROCEDURE — 36415 COLL VENOUS BLD VENIPUNCTURE: CPT | Performed by: INTERNAL MEDICINE

## 2018-08-22 PROCEDURE — 99214 OFFICE O/P EST MOD 30 MIN: CPT | Performed by: INTERNAL MEDICINE

## 2018-08-22 NOTE — NURSING NOTE
"Chief Complaint   Patient presents with     1 Week Follow up     wound        Initial /63 (BP Location: Right arm, Patient Position: Chair, Cuff Size: Adult Regular)  Pulse 61  Temp 98.2  F (36.8  C) (Tympanic)  Ht 6' 2\" (1.88 m)  Wt 232 lb 12.8 oz (105.6 kg)  SpO2 97%  BMI 29.89 kg/m2 Estimated body mass index is 29.89 kg/(m^2) as calculated from the following:    Height as of this encounter: 6' 2\" (1.88 m).    Weight as of this encounter: 232 lb 12.8 oz (105.6 kg)..    BP completed using cuff size: regular  MEDICATIONS REVIEWED  SOCIAL AND FAMILY HX REVIEWED  Sydnie Maxwell CMA  "

## 2018-08-22 NOTE — LETTER
[facility]  [address] [facility]  [address]  [place]  [address]  Tel: [phone]    August 24, 2018    Antonio KINGS Ramirez  [address]   [facility] [address]        Dear  Ashley,    The following letter pertains to your most recent diagnostic tests:    Good news! Kidney function is improving.  The hemoglobin is stable even slightly improved from last check, but still low.  See Dr. Mcintyre to see if he has any ideas on how to improve this prior to elective hip surgery.      Sincerely,    Main Hardy MD/[facility]          Enclosure: Lab Results  Results for orders placed or performed in visit on 08/22/18   Basic metabolic panel   Result Value Ref [place]    Sodium 139 133 - 144 mmol/L    Potassium 4.0 3.4 - 5.3 mmol/L    Chloride 105 94 - 109 mmol/L    Carbon Dioxide 26 20 - 32 mmol/L    Anion Gap 8 3 - 14 mmol/L    Glucose 66 (L) 70 - 99 mg/dL    Urea Nitrogen 23 7 - 30 mg/dL    Creatinine 1.62 (H) 0.66 - 1.25 mg/dL    GFR Estimate 42 (L) >60 mL/min/1.7m2    GFR Estimate If Black 50 (L) >60 mL/min/1.7m2    Calcium 8.3 (L) 8.5 - 10.1 mg/dL   CBC with platelets   Result Value Ref [place]    WBC 4.7 4.0 - 11.0 10e9/L    RBC Count 3.14 (L) 4.4 - 5.9 10e12/L    Hemoglobin 9.9 (L) 13.3 - 17.7 g/dL    Hematocrit 30.0 (L) 40.0 - 53.0 %    MCV 96 78 - 100 fl    MCH 31.5 26.5 - 33.0 pg    MCHC 33.0 31.5 - 36.5 g/dL    RDW Not Calculated 10.0 - 15.0 %    Platelet Count 121 (L) 150 - 450 10e9/L

## 2018-08-22 NOTE — LETTER
Dear Dr. Zhanna Mcintyre     I referred Mr. Ramirez to you for evaluation of anemia.  He desires to have a right total hip arthroplasty due to persistent, severe right hip pain.  However, his most recent hemoglobin was 9.1.  He did have a laboratory evaluation for reversible causes of anemia in May 2018 showing mild iron deficiency.  He was referred for 2 iron infusions, and in August 2018 his iron saturation was normal, but his hemoglobin seem to worsen.  I suspect that he may have multifactorial anemia.  His renal function has been declining since he has been hospitalized twice over the summer for obstructing nephrolithiasis and sepsis related to that.  His worsening anemia might be related to chronic renal disease and may improve with an Aranesp injection?  However, because of his history of lymphoma, and ongoing alcohol use, he may require a bone marrow biopsy to further characterize his worsening anemia depending on your assessment.  Thank you very much.    Sincerely,    Quang Hardy MD

## 2018-08-22 NOTE — MR AVS SNAPSHOT
"              After Visit Summary   8/22/2018    Antonio Ramirez    MRN: [id number]           Patient Information     Date Of Birth          1945        Visit Information        Provider Department      8/22/2018 2:30 PM Main Hardy MD [facility]        Today's Diagnoses     Acute renal failure, unspecified acute renal failure type (H)    -  1    Anemia, unspecified type        Lymphedema        Laceration of right lower extremity, subsequent encounter        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 (H)           Follow-ups after your visit        Additional Services     LYMPHEDEMA THERAPY REFERRAL       *This therapy referral will be filtered to a centralized scheduling office at [facility] and the patient will receive a call to schedule an appointment at a [place] location most convenient for them. *   If you have not heard from the scheduling office within 2 business days, please call [phone] for all locations, with the exception of [place], please call [phone].     Treatment: PT or OT Evaluation & Treatment  Special Instructions:   PT/OT Treatment Diagnosis: Edema    Please be aware that coverage of these services is subject to the terms and limitations of your health insurance plan.  Call member services at your health plan with any benefit or coverage questions.      **Note to Provider:  If you are referring outside of [place] for the therapy appointment, please list the name of the location in the \"special instructions\" above, print the referral and give to the patient to schedule the appointment.            ONC/HEME ADULT REFERRAL       Your provider has referred you to: [facility]: Cancer Care/Hematology (All Cancer Related Services) - [phone]   [email or web address]: [place] [facility] [place] [phone]   [email or web address]    Please be " aware that coverage of these services is subject to the terms and limitations of your health insurance plan.  Call member services at your health plan with any benefit or coverage questions.      Please bring the following with you to your appointment:    (1) Any X-Rays, CTs or MRIs which have been performed.  Contact the facility where they were done to arrange for  prior to your scheduled appointment.   (2) List of current medications  (3) This referral request   (4) Any documents/labs given to you for this referral                  Follow-up notes from your care team     Return in about 2 weeks (around 9/5/2018) for Routine Visit.      Your next 10 appointments already scheduled     Aug 31, 2018   Procedure with Saima Howard MD   [facility] (--)    [address]  [place] [facility] [address]   [phone]            Sep 17, 2018  2:30 PM CDT   CT HEAD W/O CONTRAST with SHCT1   [facility] ([facility])    [address] [id number] [phone]           Please bring any scans or X-rays taken at other hospitals, if similar tests were done. Also bring a list of your medicines, including vitamins, minerals and over-the-counter drugs. It is safest to leave personal items at home.  Be sure to tell your doctor:   If you have any allergies.   If there s any chance you are pregnant.   If you are breastfeeding.  You do not need to do anything special to prepare for this exam.  Please wear loose clothing, such as a sweat suit or jogging clothes. Avoid snaps, zippers and other metal. We may ask you to undress and put on a hospital gown.            Oct 02, 2018 11:15 AM CDT   New Visit with Elena Downs MD   [facility] ([facility])    [address]  [place] [id number] [phone] OPT 2              Who to contact     If you have questions or need follow up  "information about today's clinic visit or your schedule please contact [facility] directly at [phone].  Normal or non-critical lab and imaging results will be communicated to you by MyChart, letter or phone within 4 business days after the clinic has received the results. If you do not hear from us within 7 days, please contact the clinic through [employer] or phone. If you have a critical or abnormal lab result, we will notify you by phone as soon as possible.  Submit refill requests through "[employer] or call your pharmacy and they will forward the refill request to us. Please allow 3 business days for your refill to be completed.          Additional Information About Your Visit        [employer] EXTENDED STA[employer] Information     "[employer] gives you secure access to your electronic health record. If you see a primary care provider, you can also send messages to your care team and make appointments. If you have questions, please call your primary care clinic.  If you do not have a primary care provider, please call [phone] and they will assist you.        Care EveryWhere ID     This is your Care EveryWhere ID. This could be used by other organizations to access your [facility] medical records  [id number]        Your Vitals Were     Pulse Temperature Height Pulse Oximetry BMI (Body Mass Index)       61 98.2  F (36.8  C) (Tympanic) 6' 2\" (1.88 m) 97% 29.89 kg/m2        Blood Pressure from Last 3 Encounters:   08/22/18 122/63   08/16/18 99/58   08/14/18 124/66    Weight from Last 3 Encounters:   08/22/18 232 lb 12.8 oz (105.6 kg)   08/16/18 238 lb 9.6 oz (108.2 kg)   08/14/18 233 lb (105.7 kg)              We Performed the Following     Basic metabolic panel     CBC with platelets     LYMPHEDEMA THERAPY REFERRAL     ONC/HEME ADULT REFERRAL        Primary Care Provider Office Phone # Fax #    Main Hardy [phone] [address]  [place] [address]        Equal Access to Services     FIIRO " GAAR : Hadii aad ku lilia Cabrera, waaxda luqadaha, qaybta kageeda laverne, waxamanda luis haymayito nelsonmukulluis miller . So [facility] [phone].    ATENCIÓN: Si habla esprohit, tiene a shrestha disposición servicios gratuitos de asistencia lingüística. Llame al [phone].    We comply with applicable federal civil rights laws and [place] laws. We do not discriminate on the basis of race, color, national origin, age, disability, sex, sexual orientation, or gender identity.            Thank you!     Thank you for choosing [facility]  for your care. Our goal is always to provide you with excellent care. Hearing back from our patients is one way we can continue to improve our services. Please take a few minutes to complete the written survey that you may receive in the mail after your visit with us. Thank you!             Your Updated Medication List - Protect others around you: Learn how to safely use, store and throw away your medicines at www.disposemymeds.org.          This list is accurate as of 8/22/18  3:13 PM.  Always use your most recent med list.                   Brand Name Dispense Instructions for use Diagnosis    aspirin 81 MG tablet      Take 81 mg by mouth daily        carvedilol 3.125 MG tablet    COREG    180 tablet    Take 1 tablet (3.125 mg) by mouth 2 times daily (with meals)        gabapentin 600 MG tablet    NEURONTIN    270 tablet    Take 1 tablet (600 mg) by mouth 3 times daily    Phantom limb pain (H)       ipratropium 0.06 % spray    ATROVENT    3 Box    Spray 2 sprays into both nostrils 4 times daily as needed for rhinitis    Chronic vasomotor rhinitis       phenazopyridine 100 MG tablet    PYRIDIUM    90 tablet    Take 1 tablet (100 mg) by mouth 3 times daily as needed for urinary tract discomfort    Urinary tract infection without hematuria, site unspecified       sulfamethoxazole-trimethoprim 800-160 MG per tablet    BACTRIM DS/SEPTRA DS    14 tablet    Take 1 tablet by mouth  "2 times daily    Ureteral stone       TEGADERM + PAD 3-1/2\"X4\" Misc     6 each    1 Application every 3 days    Skin tear of lower leg without complication, initial encounter       torsemide 20 MG tablet    DEMADEX     Take 20 mg by mouth daily          "

## 2018-08-23 LAB
ANION GAP SERPL CALCULATED.3IONS-SCNC: 8 MMOL/L (ref 3–14)
BUN SERPL-MCNC: 23 MG/DL (ref 7–30)
CALCIUM SERPL-MCNC: 8.3 MG/DL (ref 8.5–10.1)
CHLORIDE SERPL-SCNC: 105 MMOL/L (ref 94–109)
CO2 SERPL-SCNC: 26 MMOL/L (ref 20–32)
CREAT SERPL-MCNC: 1.62 MG/DL (ref 0.66–1.25)
GFR SERPL CREATININE-BSD FRML MDRD: 42 ML/MIN/1.7M2
GLUCOSE SERPL-MCNC: 66 MG/DL (ref 70–99)
POTASSIUM SERPL-SCNC: 4 MMOL/L (ref 3.4–5.3)
SODIUM SERPL-SCNC: 139 MMOL/L (ref 133–144)

## 2018-08-24 NOTE — ANESTHESIA POSTPROCEDURE EVALUATION
Patient: Antonio Ramirez    Procedure(s):  CYSTOSCOPY, LEFT URETEROSCOPY, LEFT LASER HOLMIUM LITHOTRIPSY URETER(S) REMOVAL BILATERAL STENTS - Wound Class: II-Clean Contaminated    Diagnosis:LEFT KIDNEY STONE  Diagnosis Additional Information: No value filed.    Anesthesia Type:  General, LMA    Note:  Anesthesia Post Evaluation    Patient location during evaluation: PACU  Patient participation: Able to fully participate in evaluation  Level of consciousness: awake and alert  Pain management: adequate  Airway patency: patent  Cardiovascular status: acceptable  Respiratory status: acceptable  Hydration status: acceptable  PONV: none and controlled     Anesthetic complications: None          Last vitals:  Vitals:    08/14/18 1730 08/14/18 1821 08/14/18 1850   BP: 114/65 135/61 124/66   Resp: 16 16 16   Temp: 36  C (96.8  F) 35.6  C (96  F) 36.1  C (97  F)   SpO2: 93% 94% 94%         Electronically Signed By: Praneeth Mcneil MD  August 24, 2018  6:38 AM

## 2018-08-24 NOTE — PROGRESS NOTES
The following letter pertains to your most recent diagnostic tests:    Good news! Kidney function is improving.  The hemoglobin is stable even slightly improved from last check, but still low.  See Dr. Mcintyre to see if he has any ideas on how to improve this prior to elective hip surgery.          Sincerely,    Dr. Hardy

## 2018-08-24 NOTE — ADDENDUM NOTE
Addendum  created 08/24/18 0639 by Praneeth Mcneil MD    Anesthesia Attestations filed, Sign clinical note

## 2018-08-27 ENCOUNTER — TRANSFERRED RECORDS (OUTPATIENT)
Dept: HEALTH INFORMATION MANAGEMENT | Facility: CLINIC | Age: 73
End: 2018-08-27

## 2018-08-29 ENCOUNTER — HOSPITAL ENCOUNTER (OUTPATIENT)
Dept: OCCUPATIONAL THERAPY | Facility: CLINIC | Age: 73
Setting detail: THERAPIES SERIES
End: 2018-08-29
Attending: INTERNAL MEDICINE
Payer: MEDICARE

## 2018-08-29 PROCEDURE — G8988 SELF CARE GOAL STATUS: HCPCS | Mod: GO,CJ

## 2018-08-29 PROCEDURE — 97167 OT EVAL HIGH COMPLEX 60 MIN: CPT | Mod: GO

## 2018-08-29 PROCEDURE — 40000445 ZZHC STATISTIC OT VISIT, LYMPHEDEMA

## 2018-08-29 PROCEDURE — G8987 SELF CARE CURRENT STATUS: HCPCS | Mod: GO,CM

## 2018-08-29 PROCEDURE — 97535 SELF CARE MNGMENT TRAINING: CPT | Mod: GO

## 2018-08-29 PROCEDURE — 97166 OT EVAL MOD COMPLEX 45 MIN: CPT | Mod: GO

## 2018-08-30 NOTE — PROGRESS NOTES
" 08/29/18 1105   Quick Adds   Quick Adds Certification   Rehab Discipline   Discipline OT   Type of Visit   Type of visit Initial Edema Evaluation   General Information   Start of care 08/29/18   Referring physician Dr. Main Hardy MD   Orders Evaluate and treat as indicated   Order date 08/22/18   Medical diagnosis lymphedema   Edema onset 08/22/18   Affected body parts LLE;RLE   Edema etiology comments Patient with very extensive PMH, including renal disease, ETOH cirrhosis, CHF, CAD, non-Hodgkin's lymphoma, thrombophilia, thromboembolism, heart attack, PE   Pertinent history of current problem (PT: include personal factors and/or comorbidities that impact the POC; OT: include additional occupational profile info) Patient has LBK amputation with hx of falls, states \"my L thigh has swollen since the amputation but the R was absolutely fine until that doctor gave me 5 L of fluid, I really should pursue litigation\".  Patient has a \"machine\" for his leg swelling, does not use, has not worn compression, \"of course I elevate and I take diuretics\".  Additional PMH includes subdural hematoma with crani, PVD, OA, sepsis, skin tears, prostate CA, thoracic aortic aneurysm, a-fib, balance problem, aortic stenosis   Surgical / medical history reviewed Yes   Prior level of functional mobility mod I   Prior treatment Compression pump;Elevation;Diuretics   Community support Family / friend caregiver   Patient role / employment history Retired  ()   Living environment Apartment / condo   Living environment comments lives with supportive spouse, [place]   Current assistive devices Front wheeled walker;Standard cane  (LBK prosthesis)   Fall Risk Screen   Fall screen completed by OT   Have you fallen 2 or more times in the past year? Yes   Have you fallen and had an injury in the past year? Yes   Is patient a fall risk? Yes;Referral initiated   System Outcome Measures   Outcome Measures Lymphedema   Lymphedema Life Impact " "Scale (score range 0-72). A higher score indicates greater impairment. (patient completed only 1st page)   Subjective Report   Patient report of symptoms tightness, heaviness, pain   Precautions   Precautions Cardiac Edema/CHF;Renal Insufficiency   Precautions comments patient with multiple co-morbidities, see edema etiology   Patient / Family Goals   Patient / family goals statement \"reduce swelling and pain\" per spouse   Pain   Patient currently in pain No   Pain comments patient reports 1-2/10 level pain with activity, tenderness to touch   Cognitive Status   Orientation Orientation to person, place and time   Level of consciousness Alert   Follows commands and answers questions 100% of the time   Cognitive status comments patient very resistant and gruff \"just tell me what you're going to do about this\"   Edema Exam / Assessment   Skin condition comments Patient with 3+ pitting R pre-tibial, dorsum of R foot and L thigh; 2+ R thigh.  Stemmer's sign negative.  Healing skin tear R pre-tibial and knee, healing.  Hemosiderin staining in gaiter pattern RBK, erythema L medial thigh, vascularities R thigh   Girth Measurements   Girth Measurements Refer to separate girth measurement flowsheet  (deferred until initiation of CDT)   Activities of Daily Living   Activities of Daily Living mod I ADL, assist with IADL   Planned Edema Interventions   Planned edema interventions Manual lymph drainage;Gradient compression bandaging;Fit for compression garment;Exercises;Precautions to prevent infection / exacerbation;Education;Skin care / precautions;Home management program development   Planned edema intervention comments recommend CDT   Clinical Impression   Criteria for skilled therapeutic intervention met Yes   Therapy diagnosis lymphedema BLE secondary to disease process, multiple co-morbidities, vascular disease   Influenced by the following impairments / conditions Stage 2;Phlebolymphedema  (does not reverse with " elevation)   Assessment of Occupational Performance 3-5 Performance Deficits   Identified Performance Deficits impaired gait, impaired mobility with recent history of falls, multiple co-morbidities, limited understanding of lymphedema symptom management   Clinical Decision Making (Complexity) Moderate complexity   Treatment frequency 5 times / week   Treatment duration 2 weeks   Patient / family and/or staff in agreement with plan of care Yes   Risks and benefits of therapy have been explained Yes   Goal 1   Goal identifier volume   Goal description For decreased risk of infection, skin breakdown/wounds & progressive soft-tissue fibrosis and improved fit of footwear, volume will be reduced by 300 mL in RBK and 250 mL LLE.   Target date 10/29/18   Goal 2   Goal identifier GCB   Goal description To reduce volume of lymphedema and risk of soft tissue fibrosis, pt will tolerate up to 23hr/day wear gradient compression bandaging (GCB) of RLE, night time GCB LLE.   Target date 10/29/18   Goal 3   Goal identifier home program   Goal description For long-term home mgmt chronic lymphedema pt/caregiver independent in home program a. GCB/GCB alternative garment for night wear b. compression garment for day wear c. ex to incr lymph flow/self-MLD.   Target date 10/29/18   Goal 4   Goal identifier precautions   Goal description Pt will independently verbalize the signs/symptoms of lymphedema, precautions and how to obtain a future lymphedema referral if edema persists to preserve skin integrity, prevent infection and preserve functional mobility.      Target date 10/29/18   Total Evaluation Time   Total evaluation time 20   Certification   Certification date from 08/29/18   Certification date to 11/29/18   Medical Diagnosis lymphedema    Certification I certify the need for these services furnished under this plan of treatment and while under my care.  (Physician co-signature of this document indicates review and certification of  the therapy plan).

## 2018-08-30 NOTE — PROGRESS NOTES
[facility]        OUTPATIENT OCCUPATIONAL THERAPY EDEMA EVALUATION  PLAN OF TREATMENT FOR OUTPATIENT REHABILITATION  (COMPLETE FOR INITIAL CLAIMS ONLY)  Patient's Last Name, First Name, Antonio Owusu                           Provider s Name:   [facility] Medical Record No.  [id number]     Start of Care Date:  08/29/18   Onset Date:  08/22/18   Type:  OT   Medical Diagnosis:  lymphedema    Therapy Diagnosis:  lymphedema BLE secondary to disease process, multiple co-morbidities, vascular disease Visits from SOC:  1                                     __________________________________________________________________________________   Plan of Treatment/Functional Goals:    Manual lymph drainage, Gradient compression bandaging, Fit for compression garment, Exercises, Precautions to prevent infection / exacerbation, Education, Skin care / precautions, Home management program development  recommend CDT     GOALS  1. Goal description: For decreased risk of infection, skin breakdown/wounds & progressive soft-tissue fibrosis and improved fit of footwear, volume will be reduced by 300 mL in RBK and 250 mL LLE.       Target date: 10/29/18  2. Goal description: To reduce volume of lymphedema and risk of soft tissue fibrosis, pt will tolerate up to 23hr/day wear gradient compression bandaging (GCB) of RLE, night time GCB LLE.       Target date: 10/29/18  3. Goal description: For long-term home mgmt chronic lymphedema pt/caregiver independent in home program a. GCB/GCB alternative garment for night wear b. compression garment for day wear c. ex to incr lymph flow/self-MLD.       Target date: 10/29/18  4. Goal description: Pt will independently verbalize the signs/symptoms of lymphedema, precautions and how to obtain a future lymphedema referral if edema persists to  preserve skin integrity, prevent infection and preserve functional mobility.          Target date: 10/29/18      Treatment frequency: 5 times / week   Treatment duration: 2 weeks    Randee Resendiz OT                                    I CERTIFY THE NEED FOR THESE SERVICES FURNISHED UNDER        THIS PLAN OF TREATMENT AND WHILE UNDER MY CARE     (Physician co-signature of this document indicates review and certification of the therapy plan).                   Certification date from: 08/29/18       Certification date to: 11/29/18           Referring physician: Dr. Main Hardy MD   Initial Assessment  See Epic Evaluation- Start of care: 08/29/18

## 2018-08-31 ENCOUNTER — SURGERY (OUTPATIENT)
Age: 73
End: 2018-08-31
Payer: MEDICARE

## 2018-09-10 ENCOUNTER — PATIENT OUTREACH (OUTPATIENT)
Dept: FAMILY MEDICINE | Facility: CLINIC | Age: 73
End: 2018-09-10

## 2018-09-12 ENCOUNTER — TRANSFERRED RECORDS (OUTPATIENT)
Dept: HEALTH INFORMATION MANAGEMENT | Facility: CLINIC | Age: 73
End: 2018-09-12

## 2018-09-12 NOTE — PROGRESS NOTES
Clinic Care Coordination Contact  Care Team Conversations    Data: Writer consulted with Dr. Main Hardy today at the [facility] regarding patient. Dr. Hardy said that patient has not expressed an interest in [employer] resources and appears to have no financial concerns. At this time, Dr. Hardy does not feel that Care Coordination is indicated but will request Care Coordination in the future as needed.    Plan: [facility] plans no outreach at this time.     TREMAYNE Torrez  [facility] Care Coordination  Clinic: [place]   Email: [email or web address]  Tele: [phone]

## 2018-09-13 ENCOUNTER — OFFICE VISIT (OUTPATIENT)
Dept: FAMILY MEDICINE | Facility: CLINIC | Age: 73
End: 2018-09-13
Payer: MEDICARE

## 2018-09-13 VITALS
OXYGEN SATURATION: 95 % | HEIGHT: 74 IN | SYSTOLIC BLOOD PRESSURE: 136 MMHG | HEART RATE: 71 BPM | BODY MASS INDEX: 28.88 KG/M2 | WEIGHT: 225 LBS | TEMPERATURE: 98.4 F | DIASTOLIC BLOOD PRESSURE: 71 MMHG

## 2018-09-13 DIAGNOSIS — N18.30 ANEMIA OF CHRONIC RENAL FAILURE, STAGE 3 (MODERATE) (H): ICD-10-CM

## 2018-09-13 DIAGNOSIS — D63.1 ANEMIA OF CHRONIC RENAL FAILURE, STAGE 3 (MODERATE) (H): ICD-10-CM

## 2018-09-13 DIAGNOSIS — Z23 NEED FOR PROPHYLACTIC VACCINATION AND INOCULATION AGAINST INFLUENZA: ICD-10-CM

## 2018-09-13 DIAGNOSIS — I89.0 LYMPHEDEMA: ICD-10-CM

## 2018-09-13 DIAGNOSIS — M16.11 PRIMARY OSTEOARTHRITIS OF RIGHT HIP: Primary | ICD-10-CM

## 2018-09-13 PROCEDURE — 99213 OFFICE O/P EST LOW 20 MIN: CPT | Mod: 25 | Performed by: INTERNAL MEDICINE

## 2018-09-13 PROCEDURE — 90662 IIV NO PRSV INCREASED AG IM: CPT | Performed by: INTERNAL MEDICINE

## 2018-09-13 PROCEDURE — G0008 ADMIN INFLUENZA VIRUS VAC: HCPCS | Performed by: INTERNAL MEDICINE

## 2018-09-13 NOTE — PROGRESS NOTES

## 2018-09-13 NOTE — PROGRESS NOTES
SUBJECTIVE:   Antonio Ramirez is a 73 year old male who presents to clinic today for the following health issues:    2 week follow up     Pleasant man with multiple medical problems including alcohol abuse, cirrhosis, lymphoma, antiphospholipid antibody syndrome, history of venous thromboemboli, recent history of 2 admissions for urosepsis from obstructing nephrolithiasis, left above-the-knee amputation, coronary artery disease, thoracic aortic aneurysm, paroxysmal atrial fibrillation, prostate cancer, frequent falls with recent subdural hematoma, anemia, congestive heart failure, hypertension, hyperlipidemia.  He did see Dr. Mcintyre in follow-up regarding his anemia.  He is referred for a bone marrow biopsy that did not show significant differences from that obtained in 2012.  I do not have records from [place] [facility] yet, but the patient tells me that he is scheduled for an Aranesp injection after repeat blood tests are drawn.  His lower extremity edema has been improving steadily with the use of diuretics.  He visited with lymphedema therapy but he is skeptical of their treatment plans.  He does agree to return to see them.  His lacerations have been healing nicely since his last visit with me.    Problem list and histories reviewed & adjusted, as indicated.  Additional history: as documented    Patient Active Problem List   Diagnosis     Alcohol abuse     Alcoholic cirrhosis of liver (H)     Chronic kidney disease, stage III (moderate)     Physical deconditioning     Prostate cancer -- S/P Radiative Seed implants in 2000 (no problems since)     Antiphospholipid Jina Syn, Hypercoag (DVT, PE) -- has IVC filt and Warfarin     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 (H)     Thoracic aortic aneurysm without rupture (H)     Chronic congestive heart failure, unspecified congestive heart failure type (H)     Paroxysmal a-fib (H)     Thrombocytopenia -- suspect related to alcohol use     S/P craniotomy      Subdural hematoma (H)     Benign essential hypertension     Malabsorption of iron     Sepsis (H)     Calculi, ureter     Past Surgical History:   Procedure Laterality Date     AMPUTATE LEG BELOW KNEE Left 04/2014    related to gangrene, started as foot ulcer related to neuropathy     AMPUTATE LEG BELOW KNEE Left 12/4/2017    Procedure: AMPUTATE LEG BELOW KNEE;  Exploration of Left Leg Below Knee Amputation Stump with Ligation of Bleeders.;  Surgeon: Leandro Arreola MD;  Location:  OR     APPENDECTOMY       APPLY WOUND VAC Left 12/6/2017    Procedure: APPLY WOUND VAC;;  Surgeon: Saima Howard MD;  Location:  SD     ARTHROPLASTY KNEE Right 9/19/2014    Procedure: ARTHROPLASTY KNEE;  Surgeon: Saima Howard MD;  Location:  OR     CARDIAC SURGERY      CABG     CHOLECYSTECTOMY       COLONOSCOPY       COMBINED CYSTOSCOPY, RETROGRADES, EXCHANGE STENT URETER(S) Left 7/24/2018    Procedure: COMBINED CYSTOSCOPY, RETROGRADES, EXCHANGE STENT URETER(S);  CYSTOSCOPY, BILATERAL RETROGRADES, BILATERAL URETERAL STENT EXCHANGE ;  Surgeon: Matt Cervantes MD;  Location:  OR     CRANIOTOMY Right 4/7/2018    Procedure: CRANIOTOMY;  Right Craniotomy For Subdural Hematoma;  Surgeon: Jono Issa MD;  Location:  OR     CYSTOSCOPY, DILATE URETHRA, COMBINED N/A 10/12/2016    Procedure: COMBINED CYSTOSCOPY, DILATE URETHRA;  Surgeon: Dimitry Bello MD;  Location:  OR     CYSTOSCOPY, REMOVE STENT(S), COMBINED Right 7/24/2018    Procedure: COMBINED CYSTOSCOPY, REMOVE STENT(S);;  Surgeon: Jose Hunter MD;  Location:  OR     ENDOSCOPIC STRIPPING VEIN(S)       esophagogastroduodenoscopy       EYE SURGERY      cataracts     GENITOURINARY SURGERY      Prostate Seen Implants     HERNIA REPAIR      Umbilical     INCISION AND DRAINAGE LOWER EXTREMITY, COMBINED Left 12/1/2017    Procedure: COMBINED INCISION AND DRAINAGE LOWER EXTREMITY;  INCISION AND DRAINAGE OF LEFT BELOW KNEE AMPUTATION  ABSCESS, WOUND VAC PLACEMENT;  Surgeon: Saima Howard MD;  Location:  OR     IR IVC FILTER PLACEMENT       IRRIGATION AND DEBRIDEMENT LOWER EXTREMITY, COMBINED Left 2017    Procedure: COMBINED IRRIGATION AND DEBRIDEMENT LOWER EXTREMITY;  IRRIGATION AND DEBRIDEMENT OF LEFT BELOW KNEE AMPUTATION SITE WITH WOUND VAC REPLACEMENT;  Surgeon: Saima Howard MD;  Location:  SD     IRRIGATION AND DEBRIDEMENT LOWER EXTREMITY, COMBINED Left 2017    Procedure: COMBINED IRRIGATION AND DEBRIDEMENT LOWER EXTREMITY;  IRRIGATION AND DEBRIDEMENT OF LEFT BELOW THE KNEE AMPUTATION AND SHORTENING OF THE TIBIA WITH WOUND CLOSURE;  Surgeon: Saima Howard MD;  Location:  OR     LASER HOLMIUM LITHOTRIPSY URETER(S), INSERT STENT, COMBINED Bilateral 2018    Procedure: COMBINED CYSTOSCOPY, URETEROSCOPY, LASER HOLMIUM LITHOTRIPSY URETER(S), INSERT STENT;  CYSTOSCOPY, BILATERAL URETEROSCOPY,  HOLMIUM LASER LITHOTRIPSY, BILATERAL STENT PLACEMENT, STONE BASKETING;  Surgeon: Jose Hunter MD;  Location:  OR     LASER HOLMIUM LITHOTRIPSY URETER(S), INSERT STENT, COMBINED Left 2018    Procedure: COMBINED CYSTOSCOPY, URETEROSCOPY, LASER HOLMIUM LITHOTRIPSY URETER(S), INSERT STENT;  CYSTOSCOPY, LEFT URETEROSCOPY, LEFT LASER HOLMIUM LITHOTRIPSY URETER(S) REMOVAL BILATERAL STENTS;  Surgeon: Jose Hunter MD;  Location:  OR     ORTHOPEDIC SURGERY      (R) knee scope     ORTHOPEDIC SURGERY      total hip      ORTHOPEDIC SURGERY      elbow surgery       Social History   Substance Use Topics     Smoking status: Never Smoker     Smokeless tobacco: Never Used     Alcohol use Yes      Comment: quit 10/2015; now 5-6 Vodkas/night     Family History   Problem Relation Age of Onset     Lung Cancer Mother      Heart Failure Father       age 87         Current Outpatient Prescriptions   Medication Sig Dispense Refill     aspirin 81 MG tablet Take 81 mg by mouth daily       carvedilol (COREG) 3.125 MG tablet  "Take 1 tablet (3.125 mg) by mouth 2 times daily (with meals) 180 tablet 1     gabapentin (NEURONTIN) 600 MG tablet Take 1 tablet (600 mg) by mouth 3 times daily 270 tablet 3     ipratropium (ATROVENT) 0.06 % spray Spray 2 sprays into both nostrils 4 times daily as needed for rhinitis 3 Box 3     phenazopyridine (PYRIDIUM) 100 MG tablet Take 1 tablet (100 mg) by mouth 3 times daily as needed for urinary tract discomfort 90 tablet 3     sulfamethoxazole-trimethoprim (BACTRIM DS/SEPTRA DS) 800-160 MG per tablet Take 1 tablet by mouth 2 times daily 14 tablet 0     torsemide (DEMADEX) 20 MG tablet Take 20 mg by mouth daily       Transparent Dressings (TEGADERM + PAD 3-1/2\"X4\") MISC 1 Application every 3 days 6 each 0     Allergies   Allergen Reactions     Ciprofloxacin Itching     Severe itching \"like ants were crawling\"     Hmg-Coa-R Inhibitors Other (See Comments)     Rhabdomyolysis occurred within a couple days       Reviewed and updated as needed this visit by clinical staff       Reviewed and updated as needed this visit by Provider         ROS:    OBJECTIVE:     /71 (BP Location: Right arm, Cuff Size: Adult Regular)  Pulse 71  Temp 98.4  F (36.9  C) (Tympanic)  Ht 6' 2\" (1.88 m)  Wt 225 lb (102.1 kg)  SpO2 95%  BMI 28.89 kg/m2  Body mass index is 28.89 kg/(m^2).  General: Comfortable appearing man in no acute distress.  Extremities: Less edema than previous visit, still trace edema in the distal right lower extremity.  Skin: Lacerations and skin tears are nearly healed.  The skin tear on the left distal arm was dressed in clinic today.    Diagnostic Test Results:  none     ASSESSMENT/PLAN:       1. Primary osteoarthritis of right hip  At this point I think he can consider setting a surgery date  His swelling and lacerations are improving   After the aranesp shot his anemia is likely to be optimized     2. Anemia of chronic renal failure, stage 3 (moderate)      3. Lymphedema  Continue follow up with " lymphedema     4. Need for prophylactic vaccination and inoculation against influenza    - FLU VACCINE, INCREASED ANTIGEN, PRESV FREE, AGE 65+ [46822]  - ADMIN INFLUENZA (For MEDICARE Patients ONLY) []    FUTURE APPOINTMENTS:       - Follow-up visit for preop when surgery date set    Main Hardy MD  [facility]

## 2018-09-13 NOTE — MR AVS SNAPSHOT
"              After Visit Summary   9/13/2018    Antonio Ramirez    MRN: [id number]           Patient Information     Date Of Birth          1945        Visit Information        Provider Department      9/13/2018 5:00 PM Main Hardy MD [facility]        Today's Diagnoses     Screen for colon cancer    -  1      Care Instructions    You should get the new shingles vaccine \"SHINGRIX\" (not Zostavax) at your pharmacy.               Follow-ups after your visit        Follow-up notes from your care team     Return for for preop when surgery date set.      Your next 10 appointments already scheduled     Sep 17, 2018  2:30 PM CDT   CT HEAD W/O CONTRAST with SHCT1   [facility] ([facility])    [address] [place] [address] [phone]           How do I prepare for my exam? (Food and drink instructions) No Food and Drink Restrictions.  How do I prepare for my exam? (Other instructions) You do not need to do anything special to prepare for this exam. For a sinus scan: Use your nose spray (nasal decongestant spray) as directed.  What should I wear: Please wear loose clothing, such as a sweat suit or jogging clothes. Avoid snaps, zippers and other metal. We may ask you to undress and put on a hospital gown.  How long does the exam take: Most scans take less than 20 minutes.  What should I bring: Please bring any scans or X-rays taken at other hospitals, if similar tests were done. Also bring a list of your medicines, including vitamins, minerals and over-the-counter drugs. It is safest to leave personal items at home.  Do I need a : No  is needed.  What do I need to tell my doctor? Be sure to tell your doctor: * If you have any allergies. * If there s any chance you are pregnant. * If you are breastfeeding.  What should I do after the exam: No restrictions, You may resume normal activities.  What is this test: A CT (computed tomography) scan is a " series of pictures that allows us to look inside your body. The scanner creates images of the body in cross sections, much like slices of bread. This helps us see any problems more clearly.  Who should I call with questions: If you have any questions, please call the Imaging Department where you will have your exam. Directions, parking instructions, and other information is available on our website, [email or web address].            Sep 24, 2018  2:30 PM CDT   Lymphedema Treatment with Randee Resendiz, OT   [facility] ([facility])    [address]  [address]  [place] [address]   [phone]            Sep 25, 2018  2:15 PM CDT   Lymphedema Treatment with Randee Resendiz, OT   [facility] ([facility])    [address]  [address]  [place] [address]   [phone]            Sep 26, 2018  1:45 PM CDT   Lymphedema Treatment with Randee Resendiz, OT   [facility] ([facility])    [address]  [address]  [facility] [address]   [phone]            Sep 27, 2018  2:00 PM CDT   Lymphedema Treatment with Randee Resendiz, OT   [facility] ([facility])    [address]  [address]  [place] [address]   [phone]            Sep 28, 2018  1:30 PM CDT   Lymphedema Treatment with Gilda Reich, OT   [facility] ([facility])    [address]  [address]  [facility] [address]   [phone]            Oct 01, 2018  2:30 PM CDT   Lymphedema Treatment with Randee Resendiz, OT   [facility] ([facility])    [address]  [address]  [facility] [address]   [phone]            Oct 02, 2018 11:15 AM CDT   New Visit with Elena Downs MD   [facility] ([facility])    [address] [place] [address] [address]  [place] [address]   [phone] OPT 2            Oct 02, 2018  2:30 PM CDT   Lymphedema  "Treatment with Randee Resendiz, OT   [facility] ([facility])    [address]  [address]  [place] [address] [phone]            Oct 03, 2018  2:30 PM CDT   Lymphedema Treatment with Randee Resendiz, OT   [facility] ([facility])    [address]  [address]  [place] [address] [phone]              Who to contact     If you have questions or need follow up information about today's clinic visit or your schedule please contact [facility] directly at [phone].  Normal or non-critical lab and imaging results will be communicated to you by Cloud[employer], letter or phone within 4 business days after the clinic has received the results. If you do not hear from us within 7 days, please contact the clinic through Secure[employer] or phone. If you have a critical or abnormal lab result, we will notify you by phone as soon as possible.  Submit refill requests through [employer] or call your pharmacy and they will forward the refill request to us. Please allow 3 business days for your refill to be completed.          Additional Information About Your Visit        [employer] Information     [employer] gives you secure access to your electronic health record. If you see a primary care provider, you can also send messages to your care team and make appointments. If you have questions, please call your primary care clinic.  If you do not have a primary care provider, please call [phone] and they will assist you.        Care EveryWhere ID     This is your Care EveryWhere ID. This could be used by other organizations to access your [place] medical records  [id number]        Your Vitals Were     Pulse Temperature Height Pulse Oximetry BMI (Body Mass Index)       71 98.4  F (36.9  C) (Tympanic) 6' 2\" (1.88 m) 95% 28.89 kg/m2        Blood Pressure from Last 3 Encounters:   09/13/18 136/71   08/22/18 122/63   08/16/18 99/58    Weight from Last 3 Encounters: "   09/13/18 225 lb (102.1 kg)   08/22/18 232 lb 12.8 oz (105.6 kg)   08/16/18 238 lb 9.6 oz (108.2 kg)              Today, you had the following     No orders found for display       Primary Care Provider Office Phone # Fax #    Main Hardy [phone] [address] [facility] [address] [facility] [address]  [facility] [address]        Equal Access to Services     [facility]: Hadii aad ku hadasho Soomaali, waaxda luqadaha, qaybta kaalmada adeegyada, waxay idiin hayaan adeeg kharash la'aan . So [facility] [phone].    ATENCIÓN: Si amyla español, tiene a shrestha disposición servicios gratuitos de asistencia lingüística. [facility] [phone].    We comply with applicable federal civil rights laws and [place] laws. We do not discriminate on the basis of race, color, national origin, age, disability, sex, sexual orientation, or gender identity.            Thank you!     Thank you for choosing [facility]  for your care. Our goal is always to provide you with excellent care. Hearing back from our patients is one way we can continue to improve our services. Please take a few minutes to complete the written survey that you may receive in the mail after your visit with us. Thank you!             Your Updated Medication List - Protect others around you: Learn how to safely use, store and throw away your medicines at www.disposemymeds.org.          This list is accurate as of 9/13/18  5:38 PM.  Always use your most recent med list.                   Brand Name Dispense Instructions for use Diagnosis    aspirin 81 MG tablet      Take 81 mg by mouth daily        carvedilol 3.125 MG tablet    COREG    180 tablet    Take 1 tablet (3.125 mg) by mouth 2 times daily (with meals)        gabapentin 600 MG tablet    NEURONTIN    270 tablet    Take 1 tablet (600 mg) by mouth 3 times daily    Phantom limb pain (H)       ipratropium 0.06 % spray    ATROVENT    3 Box    Spray 2 sprays into both nostrils 4 times daily as needed for rhinitis  "   Chronic vasomotor rhinitis       phenazopyridine 100 MG tablet    PYRIDIUM    90 tablet    Take 1 tablet (100 mg) by mouth 3 times daily as needed for urinary tract discomfort    Urinary tract infection without hematuria, site unspecified       sulfamethoxazole-trimethoprim 800-160 MG per tablet    BACTRIM DS/SEPTRA DS    14 tablet    Take 1 tablet by mouth 2 times daily    Ureteral stone       TEGADERM + PAD 3-1/2\"X4\" Misc     6 each    1 Application every 3 days    Skin tear of lower leg without complication, initial encounter       torsemide 20 MG tablet    DEMADEX     Take 20 mg by mouth daily          "

## 2018-09-17 ENCOUNTER — HOSPITAL ENCOUNTER (OUTPATIENT)
Dept: CT IMAGING | Facility: CLINIC | Age: 73
Discharge: HOME | End: 2018-09-17
Attending: NEUROLOGICAL SURGERY | Admitting: NEUROLOGICAL SURGERY
Payer: MEDICARE

## 2018-09-17 DIAGNOSIS — Z98.890 S/P CRANIOTOMY: ICD-10-CM

## 2018-09-17 DIAGNOSIS — S06.5XAA SDH (SUBDURAL HEMATOMA) (H): ICD-10-CM

## 2018-09-17 PROCEDURE — 70450 CT HEAD/BRAIN W/O DYE: CPT

## 2018-09-24 ENCOUNTER — HOSPITAL ENCOUNTER (OUTPATIENT)
Dept: OCCUPATIONAL THERAPY | Facility: CLINIC | Age: 73
Setting detail: THERAPIES SERIES
End: 2018-09-24
Attending: INTERNAL MEDICINE
Payer: MEDICARE

## 2018-09-24 ENCOUNTER — OFFICE VISIT (OUTPATIENT)
Dept: FAMILY MEDICINE | Facility: CLINIC | Age: 73
End: 2018-09-24
Payer: MEDICARE

## 2018-09-24 VITALS
OXYGEN SATURATION: 96 % | SYSTOLIC BLOOD PRESSURE: 130 MMHG | TEMPERATURE: 97.3 F | WEIGHT: 230 LBS | DIASTOLIC BLOOD PRESSURE: 80 MMHG | HEART RATE: 62 BPM | BODY MASS INDEX: 29.52 KG/M2 | HEIGHT: 74 IN

## 2018-09-24 DIAGNOSIS — I89.0 LYMPHEDEMA: ICD-10-CM

## 2018-09-24 DIAGNOSIS — S78.112S: Primary | ICD-10-CM

## 2018-09-24 DIAGNOSIS — D63.8 ANEMIA IN OTHER CHRONIC DISEASES CLASSIFIED ELSEWHERE: ICD-10-CM

## 2018-09-24 PROCEDURE — 97535 SELF CARE MNGMENT TRAINING: CPT | Mod: GO

## 2018-09-24 PROCEDURE — 40000445 ZZHC STATISTIC OT VISIT, LYMPHEDEMA

## 2018-09-24 PROCEDURE — 97140 MANUAL THERAPY 1/> REGIONS: CPT | Mod: GO

## 2018-09-24 PROCEDURE — 99213 OFFICE O/P EST LOW 20 MIN: CPT | Performed by: INTERNAL MEDICINE

## 2018-09-24 NOTE — MR AVS SNAPSHOT
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      Your next 10 appointments already scheduled     Sep 24, 2018  2:30 PM CDT   Lymphedema Treatment with Randee Resendiz, OT   [facility] ([facility])    [address]  [address]  [facility] [address]   [phone]            Sep 25, 2018  2:15 PM CDT   Lymphedema Treatment with Randee Resendiz, OT   [facility] ([facility])    [address]  [address]  [facility] [address]   [phone]            Sep 26, 2018  1:45 PM CDT   Lymphedema Treatment with Randee Resendiz, OT   [facility] ([facility])    [address]  [address]  [facility] [address]   [phone]            Sep 27, 2018  2:00 PM CDT   Lymphedema Treatment with Randee Resendiz, OT   [facility] ([facility])    [address]  [address]  [facility] [address]   [phone]            Sep 28, 2018  1:30 PM CDT   Lymphedema Treatment with Gilda Reich, OT   [facility] ([facility])    [address]  [address]  [facility] [address]   [phone]            Oct 01, 2018  2:30 PM CDT   Lymphedema Treatment with Randeeneto Resendiz, OT   [facility] ([facility])    [address]  [place] [address]   [phone]            Oct 02, 2018 11:15 AM CDT   New Visit with Elena Downs MD   [facility]   [place] ([facility])    [address] [facility] [address]  [place] [address]   [phone] OPT 2            Oct 02, 2018  2:30 PM CDT   Lymphedema Treatment with Randee Resendiz, OT   [facility] ([facility])    [address] [place] [address]  [address]  [place] [address]   [phone]            Oct 03, 2018  2:30 PM CDT   Lymphedema Treatment with Randee KARIE Resendiz, OT   [facility] ([facility])    [address] [address]  [address]  [place] [address]   [phone]            Oct 04, 2018  2:30 PM CDT   Lymphedema Treatment with Randee Resendiz, OT   [facility] ([facility])    [address]  [address]  [place] [address]   [phone]              Who to contact     If you have questions or need follow up information about today's clinic visit or your schedule please contact [facility] directly at [phone].  Normal or non-critical lab and imaging results will be communicated to you by MyChart, letter or phone within 4 business days after the clinic has received the results. If you do not hear from us within 7 days, please contact the clinic through MyChart or phone. If you have a critical or abnormal lab result, we will notify you by phone as soon as possible.  Submit refill requests through [employer] or call your pharmacy and they will forward the refill request to us. Please allow 3 business days for your refill to be completed.          Additional Information About Your Visit        [employer] Information     [employer] gives you secure access to your electronic health record. If you see a primary care provider, you can also send messages to your care team and make appointments. If you have questions, please call your primary care clinic.  If you do not have a  "primary care provider, please call [phone] and they will assist you.        Care EveryWhere ID     This is your Care EveryWhere ID. This could be used by other organizations to access your [place] medical records  [id number]        Your Vitals Were     Pulse Temperature Height Pulse Oximetry BMI (Body Mass Index)       62 97.3  F (36.3  C) (Tympanic) 6' 2\" (1.88 m) 96% 29.53 kg/m2        Blood Pressure from Last 3 Encounters:   09/24/18 130/80   09/13/18 136/71   08/22/18 122/63    Weight from Last 3 Encounters:   09/24/18 230 lb (104.3 kg)   09/13/18 225 lb (102.1 kg)   08/22/18 232 lb 12.8 oz (105.6 kg)              We Performed the Following     DEPRESSION ACTION PLAN (DAP)        Primary Care Provider Office Phone # Fax #    Main Hardy [phone] [address] [address]        Equal Access to Services     [facility]: Hadii aad ku hadasho Soomaali, waaxda luqadaha, qaybta kaalmada adeegyada, igor miller . So [facility] [phone].    ATENCIÓN: Si habla español, tiene a shrestha disposición servicios gratuitos de asistencia lingüística. Llame al [phone].    We comply with applicable federal civil rights laws and [place] laws. We do not discriminate on the basis of race, color, national origin, age, disability, sex, sexual orientation, or gender identity.            Thank you!     Thank you for choosing [facility]  for your care. Our goal is always to provide you with excellent care. Hearing back from our patients is one way we can continue to improve our services. Please take a few minutes to complete the written survey that you may receive in the mail after your visit with us. Thank you!             Your Updated Medication List - Protect others around you: Learn how to safely use, store and throw away your medicines at www.disposemymeds.org.          This list is accurate as of 9/24/18 10:40 AM.  Always use your most " "recent med list.                   Brand Name Dispense Instructions for use Diagnosis    aspirin 81 MG tablet      Take 81 mg by mouth daily        carvedilol 3.125 MG tablet    COREG    180 tablet    Take 1 tablet (3.125 mg) by mouth 2 times daily (with meals)        gabapentin 600 MG tablet    NEURONTIN    270 tablet    Take 1 tablet (600 mg) by mouth 3 times daily    Phantom limb pain (H)       ipratropium 0.06 % spray    ATROVENT    3 Box    Spray 2 sprays into both nostrils 4 times daily as needed for rhinitis    Chronic vasomotor rhinitis       phenazopyridine 100 MG tablet    PYRIDIUM    90 tablet    Take 1 tablet (100 mg) by mouth 3 times daily as needed for urinary tract discomfort    Urinary tract infection without hematuria, site unspecified       sulfamethoxazole-trimethoprim 800-160 MG per tablet    BACTRIM DS/SEPTRA DS    14 tablet    Take 1 tablet by mouth 2 times daily    Ureteral stone       TEGADERM + PAD 3-1/2\"X4\" Misc     6 each    1 Application every 3 days    Skin tear of lower leg without complication, initial encounter       torsemide 20 MG tablet    DEMADEX     Take 20 mg by mouth daily          "

## 2018-09-24 NOTE — PROGRESS NOTES
SUBJECTIVE:   Antonio Ramirez is a 73 year old male who presents to clinic today for the following health issues:    Check left leg stump. Patient declines to discuss with MA. Will address with Dr. Hardy.    33-year-old man with multiple medical problems including a left above-the-knee amputation.  He presents with concerns about skin changes on his left amputation site.  Several months ago, he had a revision surgery due to concern for infection and abscess at his previous amputation stump site.  He does not routinely inspect his stump site.  However, several days ago, he noticed a purplish hue to the skin on the distal aspect of his stump site.  He did not have any associated pain, redness, swelling.  ROS negative for fevers, chills or rash elswhere.  His right lower extremity swelling is improving steadily with lymphedema therapy treatments.  His lacerations on his upper extremities have healed completely.  Also he had his chemo globin rechecked at his hematology clinic and was considered not to be a candidate for an Aranesp injection due to the fact that his hemoglobin was over 11.    Problem list and histories reviewed & adjusted, as indicated.  Additional history: as documented    Patient Active Problem List   Diagnosis     Alcohol abuse     Alcoholic cirrhosis of liver (H)     Chronic kidney disease, stage III (moderate)     Physical deconditioning     Prostate cancer -- S/P Radiative Seed implants in 2000 (no problems since)     Antiphospholipid Jina Syn, Hypercoag (DVT, PE) -- has IVC filt and Warfarin     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 (H)     Chronic congestive heart failure, unspecified congestive heart failure type (H)     Thrombocytopenia -- suspect related to alcohol use     S/P craniotomy     Subdural hematoma (H)     Benign essential hypertension     Malabsorption of iron     Sepsis (H)     Calculi, ureter     Anemia     Coronary artery disease involving native coronary artery of  native heart without angina pectoris     Aortic stenosis     Nonrheumatic mitral valve regurgitation     Nonrheumatic tricuspid valve regurgitation     Pulmonary hypertension     Paroxysmal atrial fibrillation (H)     Ischemic cardiomyopathy     Aortic root dilatation (H)     Venous thrombosis     Past Surgical History:   Procedure Laterality Date     AMPUTATE LEG BELOW KNEE Left 04/2014    related to gangrene, started as foot ulcer related to neuropathy     AMPUTATE LEG BELOW KNEE Left 12/4/2017    Procedure: AMPUTATE LEG BELOW KNEE;  Exploration of Left Leg Below Knee Amputation Stump with Ligation of Bleeders.;  Surgeon: Leandro Arreola MD;  Location:  OR     APPENDECTOMY       APPLY WOUND VAC Left 12/6/2017    Procedure: APPLY WOUND VAC;;  Surgeon: Saima Howard MD;  Location:  [facility]     ARTHROPLASTY KNEE Right 9/19/2014    Procedure: ARTHROPLASTY KNEE;  Surgeon: Saima Howard MD;  Location:  OR     CARDIAC SURGERY      CABG     CHOLECYSTECTOMY       COLONOSCOPY       COMBINED CYSTOSCOPY, RETROGRADES, EXCHANGE STENT URETER(S) Left 7/24/2018    Procedure: COMBINED CYSTOSCOPY, RETROGRADES, EXCHANGE STENT URETER(S);  CYSTOSCOPY, BILATERAL RETROGRADES, BILATERAL URETERAL STENT EXCHANGE ;  Surgeon: Matt Cervantes MD;  Location:  OR     CRANIOTOMY Right 4/7/2018    Procedure: CRANIOTOMY;  Right Craniotomy For Subdural Hematoma;  Surgeon: Jono Issa MD;  Location:  OR     CYSTOSCOPY, DILATE URETHRA, COMBINED N/A 10/12/2016    Procedure: COMBINED CYSTOSCOPY, DILATE URETHRA;  Surgeon: Dimitry Bello MD;  Location:  OR     CYSTOSCOPY, REMOVE STENT(S), COMBINED Right 7/24/2018    Procedure: COMBINED CYSTOSCOPY, REMOVE STENT(S);;  Surgeon: Jose Hunter MD;  Location:  OR     ENDOSCOPIC STRIPPING VEIN(S)       esophagogastroduodenoscopy       EYE SURGERY      cataracts     GENITOURINARY SURGERY      Prostate Seen Implants     HERNIA REPAIR      Umbilical      INCISION AND DRAINAGE LOWER EXTREMITY, COMBINED Left 12/1/2017    Procedure: COMBINED INCISION AND DRAINAGE LOWER EXTREMITY;  INCISION AND DRAINAGE OF LEFT BELOW KNEE AMPUTATION ABSCESS, WOUND VAC PLACEMENT;  Surgeon: Saima Howard MD;  Location:  OR     IR IVC FILTER PLACEMENT       IRRIGATION AND DEBRIDEMENT LOWER EXTREMITY, COMBINED Left 12/6/2017    Procedure: COMBINED IRRIGATION AND DEBRIDEMENT LOWER EXTREMITY;  IRRIGATION AND DEBRIDEMENT OF LEFT BELOW KNEE AMPUTATION SITE WITH WOUND VAC REPLACEMENT;  Surgeon: Saima Howard MD;  Location:  [facility]     IRRIGATION AND DEBRIDEMENT LOWER EXTREMITY, COMBINED Left 12/8/2017    Procedure: COMBINED IRRIGATION AND DEBRIDEMENT LOWER EXTREMITY;  IRRIGATION AND DEBRIDEMENT OF LEFT BELOW THE KNEE AMPUTATION AND SHORTENING OF THE TIBIA WITH WOUND CLOSURE;  Surgeon: Saima Howard MD;  Location:  OR     LASER HOLMIUM LITHOTRIPSY URETER(S), INSERT STENT, COMBINED Bilateral 6/28/2018    Procedure: COMBINED CYSTOSCOPY, URETEROSCOPY, LASER HOLMIUM LITHOTRIPSY URETER(S), INSERT STENT;  CYSTOSCOPY, BILATERAL URETEROSCOPY,  HOLMIUM LASER LITHOTRIPSY, BILATERAL STENT PLACEMENT, STONE BASKETING;  Surgeon: Jose Hunter MD;  Location:  OR     LASER HOLMIUM LITHOTRIPSY URETER(S), INSERT STENT, COMBINED Left 8/14/2018    Procedure: COMBINED CYSTOSCOPY, URETEROSCOPY, LASER HOLMIUM LITHOTRIPSY URETER(S), INSERT STENT;  CYSTOSCOPY, LEFT URETEROSCOPY, LEFT LASER HOLMIUM LITHOTRIPSY URETER(S) REMOVAL BILATERAL STENTS;  Surgeon: Jose Hunter MD;  Location:  OR     ORTHOPEDIC SURGERY      (R) knee scope     ORTHOPEDIC SURGERY      total hip      ORTHOPEDIC SURGERY      elbow surgery       Social History   Substance Use Topics     Smoking status: Never Smoker     Smokeless tobacco: Never Used     Alcohol use Yes      Comment: quit 10/2015; now 5-6 Vodkas/night     Family History   Problem Relation Age of Onset     Lung Cancer Mother      Heart Failure Father       " age 87         Current Outpatient Prescriptions   Medication Sig Dispense Refill     aspirin 81 MG tablet Take 81 mg by mouth daily       carvedilol (COREG) 3.125 MG tablet Take 1 tablet (3.125 mg) by mouth 2 times daily (with meals) 180 tablet 1     gabapentin (NEURONTIN) 600 MG tablet Take 1 tablet (600 mg) by mouth 3 times daily 270 tablet 3     ipratropium (ATROVENT) 0.06 % spray Spray 2 sprays into both nostrils 4 times daily as needed for rhinitis 3 Box 3     phenazopyridine (PYRIDIUM) 100 MG tablet Take 1 tablet (100 mg) by mouth 3 times daily as needed for urinary tract discomfort 90 tablet 3     sulfamethoxazole-trimethoprim (BACTRIM DS/SEPTRA DS) 800-160 MG per tablet Take 1 tablet by mouth 2 times daily 14 tablet 0     torsemide (DEMADEX) 20 MG tablet Take 20 mg by mouth daily       Transparent Dressings (TEGADERM + PAD 3-\"X4\") MISC 1 Application every 3 days 6 each 0     Allergies   Allergen Reactions     Ciprofloxacin Itching     Severe itching \"like ants were crawling\"     Hmg-Coa-R Inhibitors Other (See Comments)     Rhabdomyolysis occurred within a couple days       Reviewed and updated as needed this visit by clinical staff       Reviewed and updated as needed this visit by Provider         ROS:      OBJECTIVE:     /80 (BP Location: Right arm, Cuff Size: Adult Regular)  Pulse 62  Temp 97.3  F (36.3  C) (Tympanic)  Ht 6' 2\" (1.88 m)  Wt 230 lb (104.3 kg)  SpO2 96%  BMI 29.53 kg/m2  Body mass index is 29.53 kg/(m^2).  General: This is a well-appearing man in no acute distress.  He does not appear toxic.  He appears clinically improved from previous examinations.  He seems to be recovering steadily from his 2 bouts of severe sepsis over the summer.  Skin: There is a small callus forming over the distal end of his stump site, there is no skin ulceration, there is no appreciable skin erythema or areas of fluctuance to suggest infection, the area is not tender to palpation.  " Extremities: There is improved edema in the right lower extremity.      ASSESSMENT/PLAN:     1. 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  However, there is no evidence of infection.  Localized skin cares were reviewed with the patient.  He is scheduled for his right hip replacement surgery at the end of November.  He will need a preoperative evaluation prior to that planned surgery.  We can take another look at his left amputation stump site at that time.  In the meantime, he will continue to monitor the site for changes in the alert me if things change or if fever develops or other systemic symptoms develop.    2. Anemia in other chronic diseases classified elsewhere  Continue follow-up at hematology for surveillance hemoglobins and Aranesp injections on an as-needed basis pending hemoglobin levels    3. Lymphedema  Continue lymphedema therapy to address swelling in the right lower extremity          Main Hardy MD  [facility]

## 2018-09-25 ENCOUNTER — HOSPITAL ENCOUNTER (OUTPATIENT)
Dept: OCCUPATIONAL THERAPY | Facility: CLINIC | Age: 73
Setting detail: THERAPIES SERIES
End: 2018-09-25
Attending: INTERNAL MEDICINE
Payer: MEDICARE

## 2018-09-25 PROCEDURE — 97140 MANUAL THERAPY 1/> REGIONS: CPT | Mod: GO

## 2018-09-25 PROCEDURE — 40000445 ZZHC STATISTIC OT VISIT, LYMPHEDEMA

## 2018-09-25 ASSESSMENT — PATIENT HEALTH QUESTIONNAIRE - PHQ9: SUM OF ALL RESPONSES TO PHQ QUESTIONS 1-9: 3

## 2018-09-26 ENCOUNTER — HOSPITAL ENCOUNTER (OUTPATIENT)
Dept: OCCUPATIONAL THERAPY | Facility: CLINIC | Age: 73
Setting detail: THERAPIES SERIES
End: 2018-09-26
Attending: INTERNAL MEDICINE
Payer: MEDICARE

## 2018-09-26 PROCEDURE — 97535 SELF CARE MNGMENT TRAINING: CPT | Mod: GO

## 2018-09-26 PROCEDURE — 40000445 ZZHC STATISTIC OT VISIT, LYMPHEDEMA

## 2018-09-26 PROCEDURE — 97140 MANUAL THERAPY 1/> REGIONS: CPT | Mod: GO

## 2018-09-27 ENCOUNTER — TRANSFERRED RECORDS (OUTPATIENT)
Dept: HEALTH INFORMATION MANAGEMENT | Facility: CLINIC | Age: 73
End: 2018-09-27

## 2018-09-27 ENCOUNTER — HOSPITAL ENCOUNTER (OUTPATIENT)
Dept: OCCUPATIONAL THERAPY | Facility: CLINIC | Age: 73
Setting detail: THERAPIES SERIES
End: 2018-09-27
Attending: INTERNAL MEDICINE
Payer: MEDICARE

## 2018-09-27 PROCEDURE — 97535 SELF CARE MNGMENT TRAINING: CPT | Mod: GO

## 2018-09-27 PROCEDURE — 97140 MANUAL THERAPY 1/> REGIONS: CPT | Mod: GO

## 2018-09-27 PROCEDURE — 40000445 ZZHC STATISTIC OT VISIT, LYMPHEDEMA

## 2018-09-28 ENCOUNTER — HOSPITAL ENCOUNTER (OUTPATIENT)
Dept: OCCUPATIONAL THERAPY | Facility: CLINIC | Age: 73
Setting detail: THERAPIES SERIES
End: 2018-09-28
Attending: INTERNAL MEDICINE
Payer: MEDICARE

## 2018-09-28 PROCEDURE — 97140 MANUAL THERAPY 1/> REGIONS: CPT | Mod: GO | Performed by: OCCUPATIONAL THERAPIST

## 2018-09-28 PROCEDURE — 40000445 ZZHC STATISTIC OT VISIT, LYMPHEDEMA: Performed by: OCCUPATIONAL THERAPIST

## 2018-10-01 ENCOUNTER — TELEPHONE (OUTPATIENT)
Dept: FAMILY MEDICINE | Facility: CLINIC | Age: 73
End: 2018-10-01

## 2018-10-01 ENCOUNTER — HOSPITAL ENCOUNTER (OUTPATIENT)
Dept: OCCUPATIONAL THERAPY | Facility: CLINIC | Age: 73
Setting detail: THERAPIES SERIES
End: 2018-10-01
Attending: INTERNAL MEDICINE
Payer: MEDICARE

## 2018-10-01 DIAGNOSIS — K70.30 ALCOHOLIC CIRRHOSIS OF LIVER WITHOUT ASCITES (H): Primary | ICD-10-CM

## 2018-10-01 PROCEDURE — 40000445 ZZHC STATISTIC OT VISIT, LYMPHEDEMA

## 2018-10-01 PROCEDURE — 97140 MANUAL THERAPY 1/> REGIONS: CPT | Mod: GO

## 2018-10-01 NOTE — TELEPHONE ENCOUNTER
Called and spoke with pt. Pt reports that he has been going to the lymphedema clinic. At the clinic, they have been pushing the edema from his legs to his belly for several months. Pt reports that his belly is uncomfortable and tight now. Pt wondering if it is time for paracentesis? Pt reports that he had an U/S in July to check for the fluid in his belly. At the time, there was not very much fluid. Now, pt feels that things have changed. Pt states that the last time he needed paracentesis was probably about 2 years ago.    Please review and advise

## 2018-10-01 NOTE — TELEPHONE ENCOUNTER
Reason for Call:  Symptoms    Detailed comments: patient has been doing lymphedema exercises, and pt. Believes that the exercises are creating pressure on his belly.    A few days    Patient believes that he may need to have a paracentesis  Done.  Patient wondering what Dr. Hardy thinks about this?  Does he need to be seen?    Please call  To  advise    Phone Number Patient can be reached at: Home number on file [phone] (home)    Best Time: anytime    Can we leave a detailed message on this number? YES    Call taken on 10/1/2018 at 4:20 PM by Viviane Jones  .

## 2018-10-01 NOTE — TELEPHONE ENCOUNTER
The only way to find out is to do an ultrasound, I would recommend we do diagnostic ultrasound first, because I don't want him to hold his xarelto if there is not enough ascites fluid to tap   I ordered the ultrasound

## 2018-10-02 ENCOUNTER — OFFICE VISIT (OUTPATIENT)
Dept: CARDIOLOGY | Facility: CLINIC | Age: 73
End: 2018-10-02
Attending: INTERNAL MEDICINE
Payer: MEDICARE

## 2018-10-02 ENCOUNTER — HOSPITAL ENCOUNTER (OUTPATIENT)
Dept: OCCUPATIONAL THERAPY | Facility: CLINIC | Age: 73
Setting detail: THERAPIES SERIES
End: 2018-10-02
Attending: INTERNAL MEDICINE
Payer: MEDICARE

## 2018-10-02 VITALS
HEART RATE: 48 BPM | SYSTOLIC BLOOD PRESSURE: 124 MMHG | WEIGHT: 234 LBS | DIASTOLIC BLOOD PRESSURE: 76 MMHG | BODY MASS INDEX: 30.03 KG/M2 | HEIGHT: 74 IN

## 2018-10-02 DIAGNOSIS — I25.10 CORONARY ARTERY DISEASE INVOLVING NATIVE CORONARY ARTERY OF NATIVE HEART WITHOUT ANGINA PECTORIS: Primary | ICD-10-CM

## 2018-10-02 PROCEDURE — 99205 OFFICE O/P NEW HI 60 MIN: CPT | Performed by: INTERNAL MEDICINE

## 2018-10-02 PROCEDURE — 97140 MANUAL THERAPY 1/> REGIONS: CPT | Mod: GO

## 2018-10-02 PROCEDURE — 93000 ELECTROCARDIOGRAM COMPLETE: CPT | Performed by: INTERNAL MEDICINE

## 2018-10-02 PROCEDURE — 40000445 ZZHC STATISTIC OT VISIT, LYMPHEDEMA

## 2018-10-02 NOTE — TELEPHONE ENCOUNTER
Called pt and relayed message below. Pt agrees and will wait for scheduling to call him. Offered phone number for him to call but he declined.    Ruy GIBSON RN

## 2018-10-02 NOTE — MR AVS SNAPSHOT
After Visit Summary   10/2/2018    Antonio Ramirez    MRN: [id number]           Patient Information     Date Of Birth          1945        Visit Information        Provider Department      10/2/2018 11:15 AM Elena Downs MD [facility]   [place]        Today's Diagnoses     Coronary artery disease involving native coronary artery of native heart without angina pectoris    -  1      Care Instructions    If you need to reach us for none-urgent questions or issues, please call [phone].          Follow-ups after your visit        Additional Services     Follow-Up with Cardiologist                 Your next 10 appointments already scheduled     Oct 02, 2018  2:30 PM CDT   Lymphedema Treatment with Randee KARIE Resendiz, OT   [facility] ([facility])    [address]  [address]  [place] [address]   [phone]            Oct 03, 2018  2:30 PM CDT   Lymphedema Treatment with Randee KARIE Resendiz, OT   [facility] ([facility])    [address]  [address]  [facility] [address]   [phone]            Oct 04, 2018  2:30 PM CDT   Lymphedema Treatment with Randee KARIE Resendiz, OT   [facility] ([facility])    [address]  [address]  [place] [address]   [phone]            Oct 05, 2018  1:45 PM CDT   Lymphedema Treatment with Gilda Reich, OT   [facility] ([facility]    [address]  [address]  [facility] [address]   [phone]            Nov 27, 2018   Procedure with Saima Howard MD   [facility] (--)    [address]  [facility] [address]   [phone]              Future tests that were ordered for you today     Open Future Orders        Priority Expected Expires Ordered    Follow-Up with Cardiologist Routine 1/26/2019 10/2/2019 10/2/2018    US Abdomen Complete Routine  10/1/2019 10/1/2018           "  Who to contact     If you have questions or need follow up information about today's clinic visit or your schedule please contact [facility] directly at [phone].  Normal or non-critical lab and imaging results will be communicated to you by MyChart, letter or phone within 4 business days after the clinic has received the results. If you do not hear from us within 7 days, please contact the clinic through [employer] or phone. If you have a critical or abnormal lab result, we will notify you by phone as soon as possible.  Submit refill requests through [employer] or call your pharmacy and they will forward the refill request to us. Please allow 3 business days for your refill to be completed.          Additional Information About Your Visit        [employer] Information     [employer] gives you secure access to your electronic health record. If you see a primary care provider, you can also send messages to your care team and make appointments. If you have questions, please call your primary care clinic.  If you do not have a primary care provider, please call [phone] and they will assist you.        Care EveryWhere ID     This is your Care EveryWhere ID. This could be used by other organizations to access your [facility] medical records  [id number]        Your Vitals Were     Pulse Height BMI (Body Mass Index)             48 1.88 m (6' 2.02\") 30.03 kg/m2          Blood Pressure from Last 3 Encounters:   10/02/18 124/76   09/24/18 130/80   09/13/18 136/71    Weight from Last 3 Encounters:   10/02/18 106.1 kg (234 lb)   09/24/18 104.3 kg (230 lb)   09/13/18 102.1 kg (225 lb)              We Performed the Following     EKG 12-lead complete w/read - Clinics (performed today)          Today's Medication Changes          These changes are accurate as of 10/2/18 12:19 PM.  If you have any questions, ask your nurse or doctor.               These medicines have changed or have updated " prescriptions.        Dose/Directions    gabapentin 600 MG tablet   Commonly known as:  NEURONTIN   This may have changed:  when to take this   Used for:  Phantom limb pain (H)        Dose:  600 mg   Take 1 tablet (600 mg) by mouth 3 times daily   Quantity:  270 tablet   Refills:  3                Primary Care Provider Office Phone # Fax #    Main Hardy [phone] [address] [facility] [address]  [facility] [address]        Equal Access to Services     [facility]: Hadii aad ku hadasho Soomaali, waaxda luqadaha, qaybta kaalmada adeegyada, waxay idiin hayaan adeeg kharaluis la'mayito . So [facility] [phone].    ATENCIÓN: Si amyla esprohit, tiene a shrestha disposición servicios gratuitos de asistencia lingüística. [facility] [phone].    We comply with applicable federal civil rights laws and [place] laws. We do not discriminate on the basis of race, color, national origin, age, disability, sex, sexual orientation, or gender identity.            Thank you!     Thank you for choosing [facility]  for your care. Our goal is always to provide you with excellent care. Hearing back from our patients is one way we can continue to improve our services. Please take a few minutes to complete the written survey that you may receive in the mail after your visit with us. Thank you!             Your Updated Medication List - Protect others around you: Learn how to safely use, store and throw away your medicines at www.disposemymeds.org.          This list is accurate as of 10/2/18 12:19 PM.  Always use your most recent med list.                   Brand Name Dispense Instructions for use Diagnosis    aspirin 81 MG tablet      Take 81 mg by mouth daily        carvedilol 3.125 MG tablet    COREG    180 tablet    Take 1 tablet (3.125 mg) by mouth 2 times daily (with meals)        gabapentin 600 MG tablet    NEURONTIN    270 tablet    Take 1 tablet (600 mg) by mouth 3 times daily     "Phantom limb pain (H)       ipratropium 0.06 % spray    ATROVENT    3 Box    Spray 2 sprays into both nostrils 4 times daily as needed for rhinitis    Chronic vasomotor rhinitis       phenazopyridine 100 MG tablet    PYRIDIUM    90 tablet    Take 1 tablet (100 mg) by mouth 3 times daily as needed for urinary tract discomfort    Urinary tract infection without hematuria, site unspecified       sulfamethoxazole-trimethoprim 800-160 MG per tablet    BACTRIM DS/SEPTRA DS    14 tablet    Take 1 tablet by mouth 2 times daily    Ureteral stone       TEGADERM + PAD 3-1/2\"X4\" Misc     6 each    1 Application every 3 days    Skin tear of lower leg without complication, initial encounter       torsemide 20 MG tablet    DEMADEX     Take 20 mg by mouth daily          "

## 2018-10-02 NOTE — LETTER
"10/2/2018    Main Hardy MD  [address] [facility] [address]  [facility] [address]    RE: Antonio KU Ashley       Dear Colleague,    I had the pleasure of seeing Antonio Ramirez in the [facility].    HPI and Plan:     Mr. Antonio Ramirez was seen in consultation at [facility].  He is 73 years old with a complex medical history and was referred by Dr. Hardy to establish care.  He has been followed by [facility].    He has a history of coronary artery disease (CAD), prior coronary artery bypass surgery (CABG),  antiphospholipid syndrome, \"alcoholic\" cirrhosis, moderate mitral regurgitation, non-Hodgkin's (large B-cell) lymphoma, and persistent atrial fibrillation. He also has chronic kidney disease and anemia as well as thrombocytopenia.  He has had falls and experienced a subdural hematoma and craniotomy. He has nephrolithiasis and has had ureteral stenting. This summer, he experienced sepsis with a urinary tract infection.    It is not certain when his atrial fibrillation was first noted. He believes that he had atrial fibrillation, which would come and go at some time in the past, but he never had symptoms associated with this. Over the past several months, each time an electrocardiogram has been done, it shows atrial fibrillation. However, whenever someone listens to his heart, they note that his pulse seems to be fairly regular and that his heart rate is controlled.     Currently, Mr. Ramirez denies any shortness of breath. 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    In the past, Dr. Box switched his diuretic to torsemide. Initially, this resulted in much improved diuresis, and he noted that his edema was better. Lately, he has developed marked lymphedema and ascites.  He has been referred to [facility]hedema [facility].He has a history of an LAD stent in 2007, followed by urgent " "bypass later that same year. He denies any recent chest pain. He has been chronically anticoagulated with warfarin secondary to lower extremity thrombosis in 2007 with placement of an IVC filter. His filter thrombosed as well, and he was eventually diagnosed with antiphospholipid antibody syndrome. Recent INRs have been therapeutic.     A stress test was performed within the last 2 years in [place] and he was reportedly told there was no evidence of ischemia. His ejection fraction at that time was 47%. Recent echocardiography here shows an ejection fraction between 40 to 45% with left atrial enlargement. The mitral valve demonstrates mitral annular calcification with posterior leaflet restriction and 2+ mitral regurgitation. Moderate to severe tricuspid regurgitation was noted as well.      Exam:  Blood pressure 124/76, pulse (!) 48, height 1.88 m (6' 2.02\"), weight 106.1 kg (234 lb).  Chest was clear and cardiac exam notable for irregularly irregular S1, S2 and 1-2/6 holosystolic murmur, 1/6 LUIS at LSB.    Assessment/Plan:  This is a man with a very complex set of medical problems - many of which are interrelated.  He notes that given the complexity of his problems and his [place] hospitalizations, Dr. Hardy thought his care would be more efficient if done within the system.  Currently his cardiology issues seem stable.  They include,    1. coronary artery disease S/P CABG in 2007,  2. atrial fibrillation, asymptomatic and likely permanent,  3. ischemic cardiomyopathy, mild to moderate (EF 40-45%),  4. moderate mitral insufficiency,  5. moderately severe tricuspid insufficiency,  6. pulmonary hypertension (likely multifactorial),  7. aortic root dilation, mild,  8. aortic stenosis, moderate.    With regard to the atrial fibrillation, he is asymptomatic and appropriately but unfortunately, not on anticoagulation (especially with his history of pulmonary emboli) due to his fall risk and subdural bleed after a fall. " He is on low dose beta blockade and AV ama blockade without evidence of tachycardia. A future Holter can be considered at e return visit to assess heart rate variability.    With regard to his CAD and prior CABG, he is asymptomatic and on appropriate risk factor intervention. Statin therapy has been discontinued given his intolerance. A PCSK 9 inhibitor was briefly discussed. There is not hepatic clearance of the PCSK 9 inhibitors (proteolysis and binding to PCSK elimination) so he could be a candidate for Repatha. He is on low dose aspirin and beta blockade.  He is not on an ACEI or ARB given his   chronic kidney disease (GFR 42).      With regard to his cardiomyopathy, he has no congestive heart failure (CHF) symptoms.  He is on carvedilol but not a vasodilator (as described above).  Given his edema, hydralazine/nitrates may cause more adverse symptoms than benefits.    With regard to his mitral insufficiency, he is asymptomatic and it sounds improved from past assessments.  An echo will be repeated in the future.    His pulmonary hypertension and tricuspid insufficiency are likely related.  Pulmonary hypertension is likely multifactorial and the result of his prior pulmonary embolism, cirrhosis and possibly undiagnosed sleep apnea and/or left heart disease.  In future, a sleep study may be of benefit though he has many more immediate problems.    The mild aortic root dilation will be reassessed with echocardiography.  On the echo of July, 2018, the Sinuses were noted to be 4.3 cm.    With regard to the aortic stenosis, it is mild to moderate and the mean gradient is only 14 mmHg. It will be followed by echocardiography.    He has other contributory problems including chronic kidney disease, falls, arthritis, peripheral neuropathy, nephrolithiasis, a history of bilateral and saddle pulmonary emboli in 2006, an inferior vena cava (IVC) filter and more recently recurrent lymphedema.      For his lymphedema, he is  "starting to follow with Lymphedema clinic.  In future, he is may benefit from a venous insufficiency ultrasound of the legs.  Ablation could be helpful if compression and diuresis alone are not satisfactory.  The lymphedema is also likely multifactorial and the result of CKD, low oncotic pressure associated with liver disease, impaired mobility, an inferior vena cava filter and anemia.    A follow-up visit has been scheduled.  An echo will be repeated in 4 months.            Orders Placed This Encounter   Procedures     EKG 12-lead complete w/read - Clinics (performed today)       No orders of the defined types were placed in this encounter.      There are no discontinued medications.      Encounter Diagnosis   Name Primary?     Coronary artery disease involving native coronary artery of native heart without angina pectoris Yes       CURRENT MEDICATIONS:  Current Outpatient Prescriptions   Medication Sig Dispense Refill     aspirin 81 MG tablet Take 81 mg by mouth daily       carvedilol (COREG) 3.125 MG tablet Take 1 tablet (3.125 mg) by mouth 2 times daily (with meals) 180 tablet 1     gabapentin (NEURONTIN) 600 MG tablet Take 1 tablet (600 mg) by mouth 3 times daily (Patient taking differently: Take 600 mg by mouth 2 times daily ) 270 tablet 3     ipratropium (ATROVENT) 0.06 % spray Spray 2 sprays into both nostrils 4 times daily as needed for rhinitis 3 Box 3     torsemide (DEMADEX) 20 MG tablet Take 20 mg by mouth daily       phenazopyridine (PYRIDIUM) 100 MG tablet Take 1 tablet (100 mg) by mouth 3 times daily as needed for urinary tract discomfort (Patient not taking: Reported on 10/2/2018) 90 tablet 3     sulfamethoxazole-trimethoprim (BACTRIM DS/SEPTRA DS) 800-160 MG per tablet Take 1 tablet by mouth 2 times daily (Patient not taking: Reported on 10/2/2018) 14 tablet 0     Transparent Dressings (TEGADERM + PAD 3-1/2\"X4\") MISC 1 Application every 3 days 6 each 0       ALLERGIES     Allergies   Allergen " "Reactions     Ciprofloxacin Itching     Severe itching \"like ants were crawling\"     Hmg-Coa-R Inhibitors Other (See Comments)     Rhabdomyolysis occurred within a couple days       PAST MEDICAL HISTORY:  Past Medical History:   Diagnosis Date     Alcoholism (H)      Amputated left leg (H)      Anemia      Anticardiolipin antibody syndrome (H)      Antiphospholipid antibody syndrome (H)      Antiplatelet or antithrombotic long-term use      Aortic root dilatation (H)      Aortic stenosis      Arthritis      Balance problem      Coronary artery disease     CABG 2007: SVG to LAD, SVG to diagonal, SVG to OM; cardiac cath 5/17/18: thrombectomy for restenosis of stents-sent for urgent CABG, cath 5/14/2007: GRECIA x2 to LAD, [place] to diagonal     Gastro-oesophageal reflux disease      Heart attack (H) 2007    apparently arrested, cardiac X5     Hiatal hernia      Hypercholesterolemia      Hypertension      Ischemic cardiomyopathy      Left foot drop      Liver disease     alcoholic liver disease, alcoholic cirrohsosis, portal hypertension     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)     ?When diagnosed, patient not aware of this     Moderate mitral regurgitation      Monoclonal gammopathy      Neuropathy      Noninfectious ileitis     diverticulitis of colon     Nonrheumatic tricuspid valve regurgitation      Paroxysmal atrial fibrillation (H)      PE (pulmonary embolism) 2006    saddle emboli 2006     Prostate cancer (H) 2000    Treated with radiation (seed implants in 2000) -- no problems since     Pulmonary hypertension (H)      Renal disease     kidney stones     Syncope      Thrombocytopenia (H)      Urethral stricture      Venous thrombosis     IVC filter and lysis procedures 2007       PAST SURGICAL HISTORY:  Past Surgical History:   Procedure Laterality Date     AMPUTATE LEG BELOW KNEE Left 04/2014    related to gangrene, started as foot ulcer related to neuropathy     AMPUTATE LEG BELOW KNEE Left 12/4/2017    " Procedure: AMPUTATE LEG BELOW KNEE;  Exploration of Left Leg Below Knee Amputation Stump with Ligation of Bleeders.;  Surgeon: Leandro Arreola MD;  Location:  OR     APPENDECTOMY       APPLY WOUND VAC Left 12/6/2017    Procedure: APPLY WOUND VAC;;  Surgeon: Saima Howard MD;  Location:  SD     ARTHROPLASTY KNEE Right 9/19/2014    Procedure: ARTHROPLASTY KNEE;  Surgeon: Saima Howard MD;  Location:  OR     CARDIAC SURGERY      CABG     CHOLECYSTECTOMY       COLONOSCOPY       COMBINED CYSTOSCOPY, RETROGRADES, EXCHANGE STENT URETER(S) Left 7/24/2018    Procedure: COMBINED CYSTOSCOPY, RETROGRADES, EXCHANGE STENT URETER(S);  CYSTOSCOPY, BILATERAL RETROGRADES, BILATERAL URETERAL STENT EXCHANGE ;  Surgeon: Matt Cervantes MD;  Location:  OR     CRANIOTOMY Right 4/7/2018    Procedure: CRANIOTOMY;  Right Craniotomy For Subdural Hematoma;  Surgeon: Jono Issa MD;  Location:  OR     CYSTOSCOPY, DILATE URETHRA, COMBINED N/A 10/12/2016    Procedure: COMBINED CYSTOSCOPY, DILATE URETHRA;  Surgeon: Dimitry Bello MD;  Location:  OR     CYSTOSCOPY, REMOVE STENT(S), COMBINED Right 7/24/2018    Procedure: COMBINED CYSTOSCOPY, REMOVE STENT(S);;  Surgeon: Jose Hunter MD;  Location:  OR     ENDOSCOPIC STRIPPING VEIN(S)       esophagogastroduodenoscopy       EYE SURGERY      cataracts     GENITOURINARY SURGERY      Prostate Seen Implants     HERNIA REPAIR      Umbilical     INCISION AND DRAINAGE LOWER EXTREMITY, COMBINED Left 12/1/2017    Procedure: COMBINED INCISION AND DRAINAGE LOWER EXTREMITY;  INCISION AND DRAINAGE OF LEFT BELOW KNEE AMPUTATION ABSCESS, WOUND VAC PLACEMENT;  Surgeon: Saima Howard MD;  Location:  OR     IR IVC FILTER PLACEMENT       IRRIGATION AND DEBRIDEMENT LOWER EXTREMITY, COMBINED Left 12/6/2017    Procedure: COMBINED IRRIGATION AND DEBRIDEMENT LOWER EXTREMITY;  IRRIGATION AND DEBRIDEMENT OF LEFT BELOW KNEE AMPUTATION SITE WITH WOUND VAC  REPLACEMENT;  Surgeon: Saima Howard MD;  Location:  SD     IRRIGATION AND DEBRIDEMENT LOWER EXTREMITY, COMBINED Left 2017    Procedure: COMBINED IRRIGATION AND DEBRIDEMENT LOWER EXTREMITY;  IRRIGATION AND DEBRIDEMENT OF LEFT BELOW THE KNEE AMPUTATION AND SHORTENING OF THE TIBIA WITH WOUND CLOSURE;  Surgeon: Saima Howard MD;  Location:  OR     LASER HOLMIUM LITHOTRIPSY URETER(S), INSERT STENT, COMBINED Bilateral 2018    Procedure: COMBINED CYSTOSCOPY, URETEROSCOPY, LASER HOLMIUM LITHOTRIPSY URETER(S), INSERT STENT;  CYSTOSCOPY, BILATERAL URETEROSCOPY,  HOLMIUM LASER LITHOTRIPSY, BILATERAL STENT PLACEMENT, STONE BASKETING;  Surgeon: Jose Hunter MD;  Location:  OR     LASER HOLMIUM LITHOTRIPSY URETER(S), INSERT STENT, COMBINED Left 2018    Procedure: COMBINED CYSTOSCOPY, URETEROSCOPY, LASER HOLMIUM LITHOTRIPSY URETER(S), INSERT STENT;  CYSTOSCOPY, LEFT URETEROSCOPY, LEFT LASER HOLMIUM LITHOTRIPSY URETER(S) REMOVAL BILATERAL STENTS;  Surgeon: Jose Hunter MD;  Location:  OR     ORTHOPEDIC SURGERY      (R) knee scope     ORTHOPEDIC SURGERY      total hip      ORTHOPEDIC SURGERY      elbow surgery       FAMILY HISTORY:  Family History   Problem Relation Age of Onset     Lung Cancer Mother      Heart Failure Father       age 87       SOCIAL HISTORY:  Social History     Social History     Marital status:      Spouse name: N/A     Number of children: 2     Years of education: N/A     Occupational History     Retired       Social History Main Topics     Smoking status: Never Smoker     Smokeless tobacco: Never Used     Alcohol use Yes      Comment: quit 10/2015; now 5-6 Vodkas/night     Drug use: No     Sexual activity: Not Currently     Other Topics Concern     None     Social History Narrative    , 2 children, retired . He does not have a living will but desires full code.  (last updated 2017)        Review of Systems:  Skin:   "Negative       Eyes:  Negative      ENT:  Negative      Respiratory:  Negative       Cardiovascular:  Negative      Gastroenterology: Negative      Genitourinary:  not assessed      Musculoskeletal:  Positive for muscular weakness lower extremities  Neurologic:  Negative      Psychiatric:  Positive for sleep disturbances    Heme/Lymph/Imm:  Negative      Endocrine:  Negative        Physical Exam:  Vitals: /76  Pulse (!) 48  Ht 1.88 m (6' 2.02\")  Wt 106.1 kg (234 lb)  BMI 30.03 kg/m2    Constitutional:  cooperative, alert and oriented, well developed, well nourished, in no acute distress chronically ill;frail      Skin:  warm and dry to the touch          Head:  normocephalic        Eyes:  sclera white        Lymph:      ENT:           Neck:           Respiratory:  normal breath sounds, clear to auscultation, normal A-P diameter, normal symmetry, normal respiratory excursion, no use of accessory muscles         Cardiac: normal S1 and S2;no S3 or S4       holosystolic murmur       was 1-2/6; irregular rhythm                                           GI:      ascites    Extremities and Muscular Skeletal:      bilateral LE edema;2+;3+     wrapped on right    Neurological:  affect appropriate   left BKA amputation    Psych:  Alert and Oriented x 3;affect appropriate, oriented to time, person and place          Thank you for allowing me to participate in the care of your patient.    Sincerely,     Elena Downs MD     [facility]    "

## 2018-10-02 NOTE — LETTER
"10/2/2018    Main Hardy MD  [address] [facility] [address]  [facility] [address]    RE: Antonio KU Ashley       Dear Colleague,    I had the pleasure of seeing Antonio Ramirez in the [facility].    HPI and Plan:     Mr. Antonio Ramirez was seen in consultation at [facility].  He is 73 years old with a complex medical history and was referred by Dr. Hardy to establish care.  He has been followed by [facility] cardiology.    He has a history of coronary artery disease (CAD), prior coronary artery bypass surgery (CABG),  antiphospholipid syndrome, \"alcoholic\" cirrhosis, moderate mitral regurgitation, non-Hodgkin's (large B-cell) lymphoma, and persistent atrial fibrillation. He also has chronic kidney disease and anemia as well as thrombocytopenia.  He has had falls and experienced a subdural hematoma and craniotomy. He has nephrolithiasis and has had ureteral stenting. This summer, he experienced sepsis with a urinary tract infection.    It is not certain when his atrial fibrillation was first noted. He believes that he had atrial fibrillation, which would come and go at some time in the past, but he never had symptoms associated with this. Over the past several months, each time an electrocardiogram has been done, it shows atrial fibrillation. However, whenever someone listens to his heart, they note that his pulse seems to be fairly regular and that his heart rate is controlled.     Currently, Mr. Ramirez denies any shortness of breath. 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    In the past, Dr. Box switched his diuretic to torsemide. Initially, this resulted in much improved diuresis, and he noted that his edema was better. Lately, he has developed marked lymphedema and ascites.  He has been referred to Lymphedema Clinic.He has a history of an LAD stent in 2007, followed by urgent " "bypass later that same year. He denies any recent chest pain. He has been chronically anticoagulated with warfarin secondary to lower extremity thrombosis in 2007 with placement of an IVC filter. His filter thrombosed as well, and he was eventually diagnosed with antiphospholipid antibody syndrome. Recent INRs have been therapeutic.     A stress test was performed within the last 2 years in [place] and he was reportedly told there was no evidence of ischemia. His ejection fraction at that time was 47%. Recent echocardiography here shows an ejection fraction between 40 to 45% with left atrial enlargement. The mitral valve demonstrates mitral annular calcification with posterior leaflet restriction and 2+ mitral regurgitation. Moderate to severe tricuspid regurgitation was noted as well.      Exam:  Blood pressure 124/76, pulse (!) 48, height 1.88 m (6' 2.02\"), weight 106.1 kg (234 lb).  Chest was clear and cardiac exam notable for irregularly irregular S1, S2 and 1-2/6 holosystolic murmur, 1/6 LUIS at LSB.    Assessment/Plan:  This is a man with a very complex set of medical problems - many of which are interrelated.  He notes that given the complexity of his problems and his [facility] hospitalizations, Dr. Hardy thought his care would be more efficient if done within the system.  Currently his cardiology issues seem stable.  They include,    1. coronary artery disease S/P CABG in 2007,  2. atrial fibrillation, asymptomatic and likely permanent,  3. ischemic cardiomyopathy, mild to moderate (EF 40-45%),  4. moderate mitral insufficiency,  5. moderately severe tricuspid insufficiency,  6. pulmonary hypertension (likely multifactorial),  7. aortic root dilation, mild,  8. aortic stenosis, moderate.    With regard to the atrial fibrillation, he is asymptomatic and appropriately but unfortunately, not on anticoagulation (especially with his history of pulmonary emboli) due to his fall risk and subdural bleed after a fall. " He is on low dose beta blockade and AV ama blockade without evidence of tachycardia. A future Holter can be considered at e return visit to assess heart rate variability.    With regard to his CAD and prior CABG, he is asymptomatic and on appropriate risk factor intervention. Statin therapy has been discontinued given his intolerance. A PCSK 9 inhibitor was briefly discussed. There is not hepatic clearance of the PCSK 9 inhibitors (proteolysis and binding to PCSK elimination) so he could be a candidate for Repatha. He is on low dose aspirin and beta blockade.  He is not on an ACEI or ARB given his   chronic kidney disease (GFR 42).      With regard to his cardiomyopathy, he has no congestive heart failure (CHF) symptoms.  He is on carvedilol but not a vasodilator (as described above).  Given his edema, hydralazine/nitrates may cause more adverse symptoms than benefits.    With regard to his mitral insufficiency, he is asymptomatic and it sounds improved from past assessments.  An echo will be repeated in the future.    His pulmonary hypertension and tricuspid insufficiency are likely related.  Pulmonary hypertension is likely multifactorial and the result of his prior pulmonary embolism, cirrhosis and possibly undiagnosed sleep apnea and/or left heart disease.  In future, a sleep study may be of benefit though he has many more immediate problems.    The mild aortic root dilation will be reassessed with echocardiography.  On the echo of July, 2018, the Sinuses were noted to be 4.3 cm.    With regard to the aortic stenosis, it is mild to moderate and the mean gradient is only 14 mmHg. It will be followed by echocardiography.    He has other contributory problems including chronic kidney disease, falls, arthritis, peripheral neuropathy, nephrolithiasis, a history of bilateral and saddle pulmonary emboli in 2006, an inferior vena cava (IVC) filter and more recently recurrent lymphedema.      For his lymphedema, he is  "starting to follow with Lymphedema clinic.  In future, he is may benefit from a venous insufficiency ultrasound of the legs.  Ablation could be helpful if compression and diuresis alone are not satisfactory.  The lymphedema is also likely multifactorial and the result of CKD, low oncotic pressure associated with liver disease, impaired mobility, an inferior vena cava filter and anemia.    A follow-up visit has been scheduled.  An echo will be repeated in 4 months.            Orders Placed This Encounter   Procedures     EKG 12-lead complete w/read - Clinics (performed today)       No orders of the defined types were placed in this encounter.      There are no discontinued medications.      Encounter Diagnosis   Name Primary?     Coronary artery disease involving native coronary artery of native heart without angina pectoris Yes       CURRENT MEDICATIONS:  Current Outpatient Prescriptions   Medication Sig Dispense Refill     aspirin 81 MG tablet Take 81 mg by mouth daily       carvedilol (COREG) 3.125 MG tablet Take 1 tablet (3.125 mg) by mouth 2 times daily (with meals) 180 tablet 1     gabapentin (NEURONTIN) 600 MG tablet Take 1 tablet (600 mg) by mouth 3 times daily (Patient taking differently: Take 600 mg by mouth 2 times daily ) 270 tablet 3     ipratropium (ATROVENT) 0.06 % spray Spray 2 sprays into both nostrils 4 times daily as needed for rhinitis 3 Box 3     torsemide (DEMADEX) 20 MG tablet Take 20 mg by mouth daily       phenazopyridine (PYRIDIUM) 100 MG tablet Take 1 tablet (100 mg) by mouth 3 times daily as needed for urinary tract discomfort (Patient not taking: Reported on 10/2/2018) 90 tablet 3     sulfamethoxazole-trimethoprim (BACTRIM DS/SEPTRA DS) 800-160 MG per tablet Take 1 tablet by mouth 2 times daily (Patient not taking: Reported on 10/2/2018) 14 tablet 0     Transparent Dressings (TEGADERM + PAD 3-1/2\"X4\") MISC 1 Application every 3 days 6 each 0       ALLERGIES     Allergies   Allergen " "Reactions     Ciprofloxacin Itching     Severe itching \"like ants were crawling\"     Hmg-Coa-R Inhibitors Other (See Comments)     Rhabdomyolysis occurred within a couple days       PAST MEDICAL HISTORY:  Past Medical History:   Diagnosis Date     Alcoholism (H)      Amputated left leg (H)      Anemia      Anticardiolipin antibody syndrome (H)      Antiphospholipid antibody syndrome (H)      Antiplatelet or antithrombotic long-term use      Aortic root dilatation (H)      Aortic stenosis      Arthritis      Balance problem      Coronary artery disease     CABG 2007: SVG to LAD, SVG to diagonal, SVG to OM; cardiac cath 5/17/18: thrombectomy for restenosis of stents-sent for urgent CABG, cath 5/14/2007: GRECIA x2 to LAD, Grecia to diagonal     Gastro-oesophageal reflux disease      Heart attack (H) 2007    apparently arrested, cardiac X5     Hiatal hernia      Hypercholesterolemia      Hypertension      Ischemic cardiomyopathy      Left foot drop      Liver disease     alcoholic liver disease, alcoholic cirrohsosis, portal hypertension     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)     ?When diagnosed, patient not aware of this     Moderate mitral regurgitation      Monoclonal gammopathy      Neuropathy      Noninfectious ileitis     diverticulitis of colon     Nonrheumatic tricuspid valve regurgitation      Paroxysmal atrial fibrillation (H)      PE (pulmonary embolism) 2006    saddle emboli 2006     Prostate cancer (H) 2000    Treated with radiation (seed implants in 2000) -- no problems since     Pulmonary hypertension (H)      Renal disease     kidney stones     Syncope      Thrombocytopenia (H)      Urethral stricture      Venous thrombosis     IVC filter and lysis procedures 2007       PAST SURGICAL HISTORY:  Past Surgical History:   Procedure Laterality Date     AMPUTATE LEG BELOW KNEE Left 04/2014    related to gangrene, started as foot ulcer related to neuropathy     AMPUTATE LEG BELOW KNEE Left 12/4/2017    " Procedure: AMPUTATE LEG BELOW KNEE;  Exploration of Left Leg Below Knee Amputation Stump with Ligation of Bleeders.;  Surgeon: Leandro Arreola MD;  Location:  OR     APPENDECTOMY       APPLY WOUND VAC Left 12/6/2017    Procedure: APPLY WOUND VAC;;  Surgeon: Saima Howard MD;  Location:  SD     ARTHROPLASTY KNEE Right 9/19/2014    Procedure: ARTHROPLASTY KNEE;  Surgeon: Saima Howard MD;  Location:  OR     CARDIAC SURGERY      CABG     CHOLECYSTECTOMY       COLONOSCOPY       COMBINED CYSTOSCOPY, RETROGRADES, EXCHANGE STENT URETER(S) Left 7/24/2018    Procedure: COMBINED CYSTOSCOPY, RETROGRADES, EXCHANGE STENT URETER(S);  CYSTOSCOPY, BILATERAL RETROGRADES, BILATERAL URETERAL STENT EXCHANGE ;  Surgeon: Matt Cervantes MD;  Location:  OR     CRANIOTOMY Right 4/7/2018    Procedure: CRANIOTOMY;  Right Craniotomy For Subdural Hematoma;  Surgeon: Jono Issa MD;  Location:  OR     CYSTOSCOPY, DILATE URETHRA, COMBINED N/A 10/12/2016    Procedure: COMBINED CYSTOSCOPY, DILATE URETHRA;  Surgeon: Dimitry Bello MD;  Location:  OR     CYSTOSCOPY, REMOVE STENT(S), COMBINED Right 7/24/2018    Procedure: COMBINED CYSTOSCOPY, REMOVE STENT(S);;  Surgeon: Jose Hunter MD;  Location:  OR     ENDOSCOPIC STRIPPING VEIN(S)       esophagogastroduodenoscopy       EYE SURGERY      cataracts     GENITOURINARY SURGERY      Prostate Seen Implants     HERNIA REPAIR      Umbilical     INCISION AND DRAINAGE LOWER EXTREMITY, COMBINED Left 12/1/2017    Procedure: COMBINED INCISION AND DRAINAGE LOWER EXTREMITY;  INCISION AND DRAINAGE OF LEFT BELOW KNEE AMPUTATION ABSCESS, WOUND VAC PLACEMENT;  Surgeon: Saima Howard MD;  Location:  OR     IR IVC FILTER PLACEMENT       IRRIGATION AND DEBRIDEMENT LOWER EXTREMITY, COMBINED Left 12/6/2017    Procedure: COMBINED IRRIGATION AND DEBRIDEMENT LOWER EXTREMITY;  IRRIGATION AND DEBRIDEMENT OF LEFT BELOW KNEE AMPUTATION SITE WITH WOUND VAC  REPLACEMENT;  Surgeon: Saima Howard MD;  Location:  SD     IRRIGATION AND DEBRIDEMENT LOWER EXTREMITY, COMBINED Left 2017    Procedure: COMBINED IRRIGATION AND DEBRIDEMENT LOWER EXTREMITY;  IRRIGATION AND DEBRIDEMENT OF LEFT BELOW THE KNEE AMPUTATION AND SHORTENING OF THE TIBIA WITH WOUND CLOSURE;  Surgeon: Saima Howard MD;  Location:  OR     LASER HOLMIUM LITHOTRIPSY URETER(S), INSERT STENT, COMBINED Bilateral 2018    Procedure: COMBINED CYSTOSCOPY, URETEROSCOPY, LASER HOLMIUM LITHOTRIPSY URETER(S), INSERT STENT;  CYSTOSCOPY, BILATERAL URETEROSCOPY,  HOLMIUM LASER LITHOTRIPSY, BILATERAL STENT PLACEMENT, STONE BASKETING;  Surgeon: Jose Hunter MD;  Location:  OR     LASER HOLMIUM LITHOTRIPSY URETER(S), INSERT STENT, COMBINED Left 2018    Procedure: COMBINED CYSTOSCOPY, URETEROSCOPY, LASER HOLMIUM LITHOTRIPSY URETER(S), INSERT STENT;  CYSTOSCOPY, LEFT URETEROSCOPY, LEFT LASER HOLMIUM LITHOTRIPSY URETER(S) REMOVAL BILATERAL STENTS;  Surgeon: Jose Hunter MD;  Location:  OR     ORTHOPEDIC SURGERY      (R) knee scope     ORTHOPEDIC SURGERY      total hip      ORTHOPEDIC SURGERY      elbow surgery       FAMILY HISTORY:  Family History   Problem Relation Age of Onset     Lung Cancer Mother      Heart Failure Father       age 87       SOCIAL HISTORY:  Social History     Social History     Marital status:      Spouse name: N/A     Number of children: 2     Years of education: N/A     Occupational History     Retired       Social History Main Topics     Smoking status: Never Smoker     Smokeless tobacco: Never Used     Alcohol use Yes      Comment: quit 10/2015; now 5-6 Vodkas/night     Drug use: No     Sexual activity: Not Currently     Other Topics Concern     None     Social History Narrative    , 2 children, retired . He does not have a living will but desires full code.  (last updated 2017)        Review of Systems:  Skin:   "Negative       Eyes:  Negative      ENT:  Negative      Respiratory:  Negative       Cardiovascular:  Negative      Gastroenterology: Negative      Genitourinary:  not assessed      Musculoskeletal:  Positive for muscular weakness lower extremities  Neurologic:  Negative      Psychiatric:  Positive for sleep disturbances    Heme/Lymph/Imm:  Negative      Endocrine:  Negative        Physical Exam:  Vitals: /76  Pulse (!) 48  Ht 1.88 m (6' 2.02\")  Wt 106.1 kg (234 lb)  BMI 30.03 kg/m2    Constitutional:  cooperative, alert and oriented, well developed, well nourished, in no acute distress chronically ill;frail      Skin:  warm and dry to the touch          Head:  normocephalic        Eyes:  sclera white        Lymph:      ENT:           Neck:           Respiratory:  normal breath sounds, clear to auscultation, normal A-P diameter, normal symmetry, normal respiratory excursion, no use of accessory muscles         Cardiac: normal S1 and S2;no S3 or S4       holosystolic murmur       was 1-2/6; irregular rhythm                                           GI:      ascites    Extremities and Muscular Skeletal:      bilateral LE edema;2+;3+     wrapped on right    Neurological:  affect appropriate   left BKA amputation    Psych:  Alert and Oriented x 3;affect appropriate, oriented to time, person and place          CC  Main Hardy MD  [address]  [place] [address]                    Thank you for allowing me to participate in the care of your patient.      Sincerely,     Elena Downs MD     [facility]    cc:   Main Hardy MD  [address]  [place] [address]        "

## 2018-10-02 NOTE — PROGRESS NOTES
"HPI and Plan:     Mr. Antonio Ramirez was seen in consultation at [facility].  He is 73 years old with a complex medical history and was referred by Dr. Hardy to establish care.  He has been followed by [facility].    He has a history of coronary artery disease (CAD), prior coronary artery bypass surgery (CABG),  antiphospholipid syndrome, \"alcoholic\" cirrhosis, moderate mitral regurgitation, non-Hodgkin's (large B-cell) lymphoma, and persistent atrial fibrillation. He also has chronic kidney disease and anemia as well as thrombocytopenia.  He has had falls and experienced a subdural hematoma and craniotomy. He has nephrolithiasis and has had ureteral stenting. This summer, he experienced sepsis with a urinary tract infection.    It is not certain when his atrial fibrillation was first noted. He believes that he had atrial fibrillation, which would come and go at some time in the past, but he never had symptoms associated with this. Over the past several months, each time an electrocardiogram has been done, it shows atrial fibrillation. However, whenever someone listens to his heart, they note that his pulse seems to be fairly regular and that his heart rate is controlled.     Currently, Mr. Ramirez denies any shortness of breath. 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    In the past, Dr. Box switched his diuretic to torsemide. Initially, this resulted in much improved diuresis, and he noted that his edema was better. Lately, he has developed marked lymphedema and ascites.  He has been referred to [facility]hedema [facility].He has a history of an LAD stent in 2007, followed by urgent bypass later that same year. He denies any recent chest pain. He has been chronically anticoagulated with warfarin secondary to lower extremity thrombosis in 2007 with placement of an IVC filter. His filter thrombosed as well, " "and he was eventually diagnosed with antiphospholipid antibody syndrome. Recent INRs have been therapeutic.     A stress test was performed within the last 2 years in [place] and he was reportedly told there was no evidence of ischemia. His ejection fraction at that time was 47%. Recent echocardiography here shows an ejection fraction between 40 to 45% with left atrial enlargement. The mitral valve demonstrates mitral annular calcification with posterior leaflet restriction and 2+ mitral regurgitation. Moderate to severe tricuspid regurgitation was noted as well.      Exam:  Blood pressure 124/76, pulse (!) 48, height 1.88 m (6' 2.02\"), weight 106.1 kg (234 lb).  Chest was clear and cardiac exam notable for irregularly irregular S1, S2 and 1-2/6 holosystolic murmur, 1/6 LUIS at [facility].    Assessment/Plan:  This is a man with a very complex set of medical problems - many of which are interrelated.  He notes that given the complexity of his problems and his [facility] hospitalizations, Dr. Hardy thought his care would be more efficient if done within the system.  Currently his cardiology issues seem stable.  They include,    1. coronary artery disease S/P CABG in 2007,  2. atrial fibrillation, asymptomatic and likely permanent,  3. ischemic cardiomyopathy, mild to moderate (EF 40-45%),  4. moderate mitral insufficiency,  5. moderately severe tricuspid insufficiency,  6. pulmonary hypertension (likely multifactorial),  7. aortic root dilation, mild,  8. aortic stenosis, moderate.    With regard to the atrial fibrillation, he is asymptomatic and appropriately but unfortunately, not on anticoagulation (especially with his history of pulmonary emboli) due to his fall risk and subdural bleed after a fall. He is on low dose beta blockade and AV ama blockade without evidence of tachycardia. A future Holter can be considered at e return visit to assess heart rate variability.    With regard to his CAD and prior CABG, he is " asymptomatic and on appropriate risk factor intervention. Statin therapy has been discontinued given his intolerance. A PCSK 9 inhibitor was briefly discussed. There is not hepatic clearance of the PCSK 9 inhibitors (proteolysis and binding to PCSK elimination) so he could be a candidate for Repatha. He is on low dose aspirin and beta blockade.  He is not on an ACEI or ARB given his   chronic kidney disease (GFR 42).      With regard to his cardiomyopathy, he has no congestive heart failure (CHF) symptoms.  He is on carvedilol but not a vasodilator (as described above).  Given his edema, hydralazine/nitrates may cause more adverse symptoms than benefits.    With regard to his mitral insufficiency, he is asymptomatic and it sounds improved from past assessments.  An echo will be repeated in the future.    His pulmonary hypertension and tricuspid insufficiency are likely related.  Pulmonary hypertension is likely multifactorial and the result of his prior pulmonary embolism, cirrhosis and possibly undiagnosed sleep apnea and/or left heart disease.  In future, a sleep study may be of benefit though he has many more immediate problems.    The mild aortic root dilation will be reassessed with echocardiography.  On the echo of July, 2018, the Sinuses were noted to be 4.3 cm.    With regard to the aortic stenosis, it is mild to moderate and the mean gradient is only 14 mmHg. It will be followed by echocardiography.    He has other contributory problems including chronic kidney disease, falls, arthritis, peripheral neuropathy, nephrolithiasis, a history of bilateral and saddle pulmonary emboli in 2006, an inferior vena cava (IVC) filter and more recently recurrent lymphedema.      For his lymphedema, he is starting to follow with Lymphedema clinic.  In future, he is may benefit from a venous insufficiency ultrasound of the legs.  Ablation could be helpful if compression and diuresis alone are not satisfactory.  The  "lymphedema is also likely multifactorial and the result of CKD, low oncotic pressure associated with liver disease, impaired mobility, an inferior vena cava filter and anemia.    A follow-up visit has been scheduled.  An echo will be repeated in 4 months.            Orders Placed This Encounter   Procedures     EKG 12-lead complete w/read - Clinics (performed today)       No orders of the defined types were placed in this encounter.      There are no discontinued medications.      Encounter Diagnosis   Name Primary?     Coronary artery disease involving native coronary artery of native heart without angina pectoris Yes       CURRENT MEDICATIONS:  Current Outpatient Prescriptions   Medication Sig Dispense Refill     aspirin 81 MG tablet Take 81 mg by mouth daily       carvedilol (COREG) 3.125 MG tablet Take 1 tablet (3.125 mg) by mouth 2 times daily (with meals) 180 tablet 1     gabapentin (NEURONTIN) 600 MG tablet Take 1 tablet (600 mg) by mouth 3 times daily (Patient taking differently: Take 600 mg by mouth 2 times daily ) 270 tablet 3     ipratropium (ATROVENT) 0.06 % spray Spray 2 sprays into both nostrils 4 times daily as needed for rhinitis 3 Box 3     torsemide (DEMADEX) 20 MG tablet Take 20 mg by mouth daily       phenazopyridine (PYRIDIUM) 100 MG tablet Take 1 tablet (100 mg) by mouth 3 times daily as needed for urinary tract discomfort (Patient not taking: Reported on 10/2/2018) 90 tablet 3     sulfamethoxazole-trimethoprim (BACTRIM DS/SEPTRA DS) 800-160 MG per tablet Take 1 tablet by mouth 2 times daily (Patient not taking: Reported on 10/2/2018) 14 tablet 0     Transparent Dressings (TEGADERM + PAD 3-1/2\"X4\") MISC 1 Application every 3 days 6 each 0       ALLERGIES     Allergies   Allergen Reactions     Ciprofloxacin Itching     Severe itching \"like ants were crawling\"     Hmg-Coa-R Inhibitors Other (See Comments)     Rhabdomyolysis occurred within a couple days       PAST MEDICAL HISTORY:  Past Medical " History:   Diagnosis Date     Alcoholism (H)      Amputated left leg (H)      Anemia      Anticardiolipin antibody syndrome (H)      Antiphospholipid antibody syndrome (H)      Antiplatelet or antithrombotic long-term use      Aortic root dilatation (H)      Aortic stenosis      Arthritis      Balance problem      Coronary artery disease     CABG 2007: SVG to LAD, SVG to diagonal, SVG to OM; cardiac cath 5/17/18: thrombectomy for restenosis of stents-sent for urgent CABG, cath 5/14/2007: GRECIA x2 to LAD, Grecia to diagonal     Gastro-oesophageal reflux disease      Heart attack (H) 2007    apparently arrested, cardiac X5     Hiatal hernia      Hypercholesterolemia      Hypertension      Ischemic cardiomyopathy      Left foot drop      Liver disease     alcoholic liver disease, alcoholic cirrohsosis, portal hypertension     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)     ?When diagnosed, patient not aware of this     Moderate mitral regurgitation      Monoclonal gammopathy      Neuropathy      Noninfectious ileitis     diverticulitis of colon     Nonrheumatic tricuspid valve regurgitation      Paroxysmal atrial fibrillation (H)      PE (pulmonary embolism) 2006    saddle emboli 2006     Prostate cancer (H) 2000    Treated with radiation (seed implants in 2000) -- no problems since     Pulmonary hypertension (H)      Renal disease     kidney stones     Syncope      Thrombocytopenia (H)      Urethral stricture      Venous thrombosis     IVC filter and lysis procedures 2007       PAST SURGICAL HISTORY:  Past Surgical History:   Procedure Laterality Date     AMPUTATE LEG BELOW KNEE Left 04/2014    related to gangrene, started as foot ulcer related to neuropathy     AMPUTATE LEG BELOW KNEE Left 12/4/2017    Procedure: AMPUTATE LEG BELOW KNEE;  Exploration of Left Leg Below Knee Amputation Stump with Ligation of Bleeders.;  Surgeon: Leandro Arreola MD;  Location: [facility] OR     APPENDECTOMY       APPLY WOUND VAC Left  12/6/2017    Procedure: APPLY WOUND VAC;;  Surgeon: Saima Howard MD;  Location:  [facility]     ARTHROPLASTY KNEE Right 9/19/2014    Procedure: ARTHROPLASTY KNEE;  Surgeon: Saima Howard MD;  Location:  OR     CARDIAC SURGERY      CABG     CHOLECYSTECTOMY       COLONOSCOPY       COMBINED CYSTOSCOPY, RETROGRADES, EXCHANGE STENT URETER(S) Left 7/24/2018    Procedure: COMBINED CYSTOSCOPY, RETROGRADES, EXCHANGE STENT URETER(S);  CYSTOSCOPY, BILATERAL RETROGRADES, BILATERAL URETERAL STENT EXCHANGE ;  Surgeon: Matt Cervantes MD;  Location:  OR     CRANIOTOMY Right 4/7/2018    Procedure: CRANIOTOMY;  Right Craniotomy For Subdural Hematoma;  Surgeon: Jono Issa MD;  Location:  OR     CYSTOSCOPY, DILATE URETHRA, COMBINED N/A 10/12/2016    Procedure: COMBINED CYSTOSCOPY, DILATE URETHRA;  Surgeon: Dimitry Bello MD;  Location:  OR     CYSTOSCOPY, REMOVE STENT(S), COMBINED Right 7/24/2018    Procedure: COMBINED CYSTOSCOPY, REMOVE STENT(S);;  Surgeon: Jose Hunter MD;  Location:  OR     ENDOSCOPIC STRIPPING VEIN(S)       esophagogastroduodenoscopy       EYE SURGERY      cataracts     GENITOURINARY SURGERY      Prostate Seen Implants     HERNIA REPAIR      Umbilical     INCISION AND DRAINAGE LOWER EXTREMITY, COMBINED Left 12/1/2017    Procedure: COMBINED INCISION AND DRAINAGE LOWER EXTREMITY;  INCISION AND DRAINAGE OF LEFT BELOW KNEE AMPUTATION ABSCESS, WOUND VAC PLACEMENT;  Surgeon: Saima Howard MD;  Location:  OR     IR IVC FILTER PLACEMENT       IRRIGATION AND DEBRIDEMENT LOWER EXTREMITY, COMBINED Left 12/6/2017    Procedure: COMBINED IRRIGATION AND DEBRIDEMENT LOWER EXTREMITY;  IRRIGATION AND DEBRIDEMENT OF LEFT BELOW KNEE AMPUTATION SITE WITH WOUND VAC REPLACEMENT;  Surgeon: Saima Howard MD;  [facility]:  [facility]     IRRIGATION AND DEBRIDEMENT LOWER EXTREMITY, COMBINED Left 12/8/2017    Procedure: COMBINED IRRIGATION AND DEBRIDEMENT LOWER EXTREMITY;  IRRIGATION  AND DEBRIDEMENT OF LEFT BELOW THE KNEE AMPUTATION AND SHORTENING OF THE TIBIA WITH WOUND CLOSURE;  Surgeon: Saima Howard MD;  Location:  OR     LASER HOLMIUM LITHOTRIPSY URETER(S), INSERT STENT, COMBINED Bilateral 2018    Procedure: COMBINED CYSTOSCOPY, URETEROSCOPY, LASER HOLMIUM LITHOTRIPSY URETER(S), INSERT STENT;  CYSTOSCOPY, BILATERAL URETEROSCOPY,  HOLMIUM LASER LITHOTRIPSY, BILATERAL STENT PLACEMENT, STONE BASKETING;  Surgeon: Jose Hunter MD;  Location:  OR     LASER HOLMIUM LITHOTRIPSY URETER(S), INSERT STENT, COMBINED Left 2018    Procedure: COMBINED CYSTOSCOPY, URETEROSCOPY, LASER HOLMIUM LITHOTRIPSY URETER(S), INSERT STENT;  CYSTOSCOPY, LEFT URETEROSCOPY, LEFT LASER HOLMIUM LITHOTRIPSY URETER(S) REMOVAL BILATERAL STENTS;  Surgeon: Jose Hunter MD;  Location:  OR     ORTHOPEDIC SURGERY      (R) knee scope     ORTHOPEDIC SURGERY      total hip      ORTHOPEDIC SURGERY      elbow surgery       FAMILY HISTORY:  Family History   Problem Relation Age of Onset     Lung Cancer Mother      Heart Failure Father       age 87       SOCIAL HISTORY:  Social History     Social History     Marital status:      Spouse name: N/A     Number of children: 2     Years of education: N/A     Occupational History     Retired       Social History Main Topics     Smoking status: Never Smoker     Smokeless tobacco: Never Used     Alcohol use Yes      Comment: quit 10/2015; now 5-6 Vodkas/night     Drug use: No     Sexual activity: Not Currently     Other Topics Concern     None     Social History Narrative    , 2 children, retired . He does not have a living will but desires full code.  (last updated 2017)        Review of Systems:  Skin:  Negative       Eyes:  Negative      ENT:  Negative      Respiratory:  Negative       Cardiovascular:  Negative      Gastroenterology: Negative      Genitourinary:  not assessed      Musculoskeletal:  Positive for  "muscular weakness lower extremities  Neurologic:  Negative      Psychiatric:  Positive for sleep disturbances    Heme/Lymph/Imm:  Negative      Endocrine:  Negative        Physical Exam:  Vitals: /76  Pulse (!) 48  Ht 1.88 m (6' 2.02\")  Wt 106.1 kg (234 lb)  BMI 30.03 kg/m2    Constitutional:  cooperative, alert and oriented, well developed, well nourished, in no acute distress chronically ill;frail      Skin:  warm and dry to the touch          Head:  normocephalic        Eyes:  sclera white        Lymph:      ENT:           Neck:           Respiratory:  normal breath sounds, clear to auscultation, normal A-P diameter, normal symmetry, normal respiratory excursion, no use of accessory muscles         Cardiac: normal S1 and S2;no S3 or S4       holosystolic murmur       was 1-2/6; irregular rhythm                                           GI:      ascites    Extremities and Muscular Skeletal:      bilateral LE edema;2+;3+     wrapped on right    Neurological:  affect appropriate   left BKA amputation    Psych:  Alert and Oriented x 3;affect appropriate, oriented to time, person and place          CC  Main Hardy MD  [address]  [place] [address]                  "

## 2018-10-03 ENCOUNTER — HOSPITAL ENCOUNTER (OUTPATIENT)
Dept: OCCUPATIONAL THERAPY | Facility: CLINIC | Age: 73
Setting detail: THERAPIES SERIES
End: 2018-10-03
Attending: INTERNAL MEDICINE
Payer: MEDICARE

## 2018-10-03 PROCEDURE — 97140 MANUAL THERAPY 1/> REGIONS: CPT | Mod: GO

## 2018-10-03 PROCEDURE — 97535 SELF CARE MNGMENT TRAINING: CPT | Mod: GO

## 2018-10-03 PROCEDURE — 40000445 ZZHC STATISTIC OT VISIT, LYMPHEDEMA

## 2018-10-04 ENCOUNTER — HOSPITAL ENCOUNTER (OUTPATIENT)
Dept: OCCUPATIONAL THERAPY | Facility: CLINIC | Age: 73
Setting detail: THERAPIES SERIES
End: 2018-10-04
Attending: INTERNAL MEDICINE
Payer: MEDICARE

## 2018-10-04 PROCEDURE — G8988 SELF CARE GOAL STATUS: HCPCS | Mod: GO,CJ

## 2018-10-04 PROCEDURE — 40000445 ZZHC STATISTIC OT VISIT, LYMPHEDEMA

## 2018-10-04 PROCEDURE — 97140 MANUAL THERAPY 1/> REGIONS: CPT | Mod: GO

## 2018-10-04 PROCEDURE — G8989 SELF CARE D/C STATUS: HCPCS | Mod: GO,CJ

## 2018-10-05 NOTE — PROGRESS NOTES
Outpatient Occupational Therapy Discharge Note     Patient: Antonio Ramirez  : 1945    Beginning/End Dates of Reporting Period:  18 to 10/4/2018    Referring Provider: Dr. Main Hardy    Therapy Diagnosis: lymphedema BLE secondary to disease process, multiple co-morbidities, vascular disease    Client Self Report:   Patient had nothing new to report this visit    Objective Measurements:     Objective Measure: BLE volume   Details: see attached flowsheet; RLE volume has decreased by 1210 mL vs. baseline measurements taken , thigh circumferential measurements demonstrate increase at 60 and 80 cm, decrease at 70 cm.          Outcome Measures (most recent score):  Lymphedema Life Impact Scale (score range 0-72). A higher score indicates greater impairment.: 4    Goals:   Goal Identifier volume   Goal Description For decreased risk of infection, skin breakdown/wounds & progressive soft-tissue fibrosis and improved fit of footwear, volume will be reduced by 300 mL in RBK and a decrease in circumferential measurements LLE.   Target Date 10/29/18   Date Met  10/04/18 (GOAL PARTIALLY MET; EXCEEDED RLE, DID NOT MEET LLE)   Progress:     Goal Identifier GCB   Goal Description To reduce volume of lymphedema and risk of soft tissue fibrosis, pt will tolerate up to 23hr/day wear gradient compression bandaging (GCB) of RLE, night time GCB LLE.   Target Date 10/29/18   Date Met  10/04/18 (GOAL MET)   Progress:     Goal Identifier home program   Goal Description For long-term home mgmt chronic lymphedema pt/caregiver independent in home program a. GCB/GCB alternative garment for night wear b. compression garment for day wear c. ex to incr lymph flow/self-MLD.   Target Date 10/29/18   Date Met  10/04/18 (GOAL PARTIALLY MET, PATIENT DECLINED LEARNING SELF-MLD, HEP)   Progress:     Goal Identifier precautions   Goal Description Pt will independently verbalize the signs/symptoms of lymphedema, precautions and how to  "obtain a future lymphedema referral if edema persists to preserve skin integrity, prevent infection and preserve functional mobility.      Target Date 10/29/18   Date Met  10/04/18 (GOAL MET)   Progress:       Plan:  Discharge from therapy.    Discharge:    Reason for Discharge: Patient has met goals #2 and 4, partially met goals #1 and 3 (exceeded volume loss RLE, did not meet volume goal LLE, largely due to ability to wear night time compression only; declined learning HEP and self-MLD). Patient wishes to discharge this date with no follow up \"I will call you if I need anything\".     Equipment Issued: one set GCB supplies    Discharge Plan: Patient to continue home program, including RLE 20-30 mmHg thigh high compression sock with overlapping compressive bike-style short for day wear, BLE velcro binders with component pieces for night time.  "

## 2018-10-25 ENCOUNTER — TRANSFERRED RECORDS (OUTPATIENT)
Dept: HEALTH INFORMATION MANAGEMENT | Facility: CLINIC | Age: 73
End: 2018-10-25

## 2018-10-26 ENCOUNTER — HOSPITAL ENCOUNTER (OUTPATIENT)
Dept: ULTRASOUND IMAGING | Facility: CLINIC | Age: 73
Discharge: HOME | End: 2018-10-26
Attending: INTERNAL MEDICINE | Admitting: INTERNAL MEDICINE
Payer: MEDICARE

## 2018-10-26 DIAGNOSIS — K70.30 ALCOHOLIC CIRRHOSIS OF LIVER WITHOUT ASCITES (H): ICD-10-CM

## 2018-10-26 PROCEDURE — 76705 ECHO EXAM OF ABDOMEN: CPT

## 2018-10-26 NOTE — LETTER
[facility]  [address] [facility]  [address]  [place]  [address]  Tel: [phone]    October 29, 2018    Antonio Ramirez  [address]   [facility] [address]        Dear Sage Gibson, your ultrasound shows a moderate amount of ascites (fluid in the abdomen).  I have ordered the ultrasound guided paracentesis.  Please call [place] radiology at [phone] to schedule your procedure.  You would need to hold xarelto for 48 hours prior to the procedure or as directed by the radiology department performing the procedure.    If you have any further questions or problems, please contact our office.      Sincerely,    Main Hardy MD/RENETTA

## 2018-10-29 ENCOUNTER — TELEPHONE (OUTPATIENT)
Dept: FAMILY MEDICINE | Facility: CLINIC | Age: 73
End: 2018-10-29

## 2018-10-29 DIAGNOSIS — K70.31 ALCOHOLIC CIRRHOSIS OF LIVER WITH ASCITES (H): Primary | ICD-10-CM

## 2018-10-29 NOTE — PROGRESS NOTES
The following letter pertains to your most recent diagnostic tests:    Sage, your ultrasound shows a moderate amount of ascites (fluid in the abdomen).  I have ordered the ultrasound guided paracentesis.  Please call [place] radiology at [phone] to schedule your procedure.  You would need to hold xarelto for 48 hours prior to the procedure or as directed by the radiology department performing the procedure.      Sincerely,    Dr. Hardy

## 2018-10-29 NOTE — TELEPHONE ENCOUNTER
Can you call Sage and inform him that his ultrasound shows a moderate amount of ascites  And he can have a thoracentesis, which I ordered  He would need to stop Xarelto 48 hours prior to the procedure or as how directed by the radiology department

## 2018-10-30 ENCOUNTER — HOSPITAL ENCOUNTER (OUTPATIENT)
Facility: CLINIC | Age: 73
Discharge: HOME | End: 2018-10-30
Admitting: INTERNAL MEDICINE
Payer: MEDICARE

## 2018-10-30 ENCOUNTER — HOSPITAL ENCOUNTER (OUTPATIENT)
Dept: ULTRASOUND IMAGING | Facility: CLINIC | Age: 73
End: 2018-10-30
Attending: INTERNAL MEDICINE | Admitting: COLON & RECTAL SURGERY
Payer: MEDICARE

## 2018-10-30 VITALS
SYSTOLIC BLOOD PRESSURE: 131 MMHG | RESPIRATION RATE: 16 BRPM | TEMPERATURE: 96 F | HEART RATE: 55 BPM | OXYGEN SATURATION: 98 % | DIASTOLIC BLOOD PRESSURE: 80 MMHG

## 2018-10-30 DIAGNOSIS — K70.31 ALCOHOLIC CIRRHOSIS OF LIVER WITH ASCITES (H): ICD-10-CM

## 2018-10-30 LAB
INR PPP: 1.46 (ref 0.86–1.14)
PLATELET # BLD AUTO: 82 10E9/L (ref 150–450)

## 2018-10-30 PROCEDURE — 25000125 ZZHC RX 250: Performed by: RADIOLOGY

## 2018-10-30 PROCEDURE — 85049 AUTOMATED PLATELET COUNT: CPT | Performed by: RADIOLOGY

## 2018-10-30 PROCEDURE — 36415 COLL VENOUS BLD VENIPUNCTURE: CPT | Performed by: RADIOLOGY

## 2018-10-30 PROCEDURE — 85610 PROTHROMBIN TIME: CPT | Performed by: RADIOLOGY

## 2018-10-30 PROCEDURE — 27210190 US PARACENTESIS

## 2018-10-30 PROCEDURE — 40000863 ZZH STATISTIC RADIOLOGY XRAY, US, CT, MAR, NM

## 2018-10-30 RX ORDER — LIDOCAINE 40 MG/G
CREAM TOPICAL
Status: DISCONTINUED | OUTPATIENT
Start: 2018-10-30 | End: 2018-10-30 | Stop reason: HOSPADM

## 2018-10-30 RX ORDER — ALBUMIN (HUMAN) 12.5 G/50ML
12.5 SOLUTION INTRAVENOUS ONCE
Status: DISCONTINUED | OUTPATIENT
Start: 2018-10-30 | End: 2018-10-30 | Stop reason: HOSPADM

## 2018-10-30 RX ORDER — FLUTICASONE PROPIONATE 50 MCG
1 SPRAY, SUSPENSION (ML) NASAL DAILY PRN
Status: ON HOLD | COMMUNITY
End: 2019-03-05

## 2018-10-30 RX ORDER — LIDOCAINE HYDROCHLORIDE 10 MG/ML
5 INJECTION, SOLUTION EPIDURAL; INFILTRATION; INTRACAUDAL; PERINEURAL ONCE
Status: COMPLETED | OUTPATIENT
Start: 2018-10-30 | End: 2018-10-30

## 2018-10-30 RX ADMIN — LIDOCAINE HYDROCHLORIDE 5 ML: 10 INJECTION, SOLUTION EPIDURAL; INFILTRATION; INTRACAUDAL; PERINEURAL at 14:13

## 2018-10-30 NOTE — IP AVS SNAPSHOT
[place] [address]    [place] [address]    Phone:  [phone]                                       After Visit Summary   10/30/2018    Antonio Ramirez    MRN: [id number]           After Visit Summary Signature Page     I have received my discharge instructions, and my questions have been answered. I have discussed any challenges I see with this plan with the nurse or doctor.    ..........................................................................................................................................  Patient/Patient Representative Signature      ..........................................................................................................................................  Patient Representative Print Name and Relationship to Patient    ..................................................               ................................................  Date                                   Time    ..........................................................................................................................................  Reviewed by Signature/Title    ...................................................              ..............................................  Date                                               Time          22EPIC Rev 08/18

## 2018-10-30 NOTE — IP AVS SNAPSHOT
"                  MRN:[id number]                      After Visit Summary   10/30/2018    Antonio Ramirez    MRN: [id number]           Visit Information        Department      10/30/2018 12:39 PM [facility]          Review of your medicines      UNREVIEWED medicines. Ask your doctor about these medicines        Dose / Directions    aspirin 81 MG tablet        Dose:  81 mg   Take 81 mg by mouth daily   Refills:  0       carvedilol 3.125 MG tablet   Commonly known as:  COREG        Dose:  3.125 mg   Take 1 tablet (3.125 mg) by mouth 2 times daily (with meals)   Quantity:  180 tablet   Refills:  1       gabapentin 600 MG tablet   Commonly known as:  NEURONTIN   Used for:  Phantom limb pain (H)        Dose:  600 mg   Take 1 tablet (600 mg) by mouth 3 times daily   Quantity:  270 tablet   Refills:  3       ipratropium 0.06 % spray   Commonly known as:  ATROVENT   Used for:  Chronic vasomotor rhinitis        Dose:  2 spray   Spray 2 sprays into both nostrils 4 times daily as needed for rhinitis   Quantity:  3 Box   Refills:  3       phenazopyridine 100 MG tablet   Commonly known as:  PYRIDIUM   Used for:  Urinary tract infection without hematuria, site unspecified        Dose:  100 mg   Take 1 tablet (100 mg) by mouth 3 times daily as needed for urinary tract discomfort   Quantity:  90 tablet   Refills:  3       sulfamethoxazole-trimethoprim 800-160 MG per tablet   Commonly known as:  BACTRIM DS/SEPTRA DS   Used for:  Ureteral stone        Dose:  1 tablet   Take 1 tablet by mouth 2 times daily   Quantity:  14 tablet   Refills:  0       torsemide 20 MG tablet   Commonly known as:  DEMADEX        Dose:  20 mg   Take 20 mg by mouth daily   Refills:  0         CONTINUE these medicines which have NOT CHANGED        Dose / Directions    TEGADERM + PAD 3-1/2\"X4\" Misc   Used for:  Skin tear of lower leg without complication, initial encounter        Dose:  1 Application   1 Application every 3 days   Quantity: "  6 each   Refills:  0                Protect others around you: Learn how to safely use, store and throw away your medicines at www.disposemymeds.org.         Follow-ups after your visit        Your next 10 appointments already scheduled     Oct 30, 2018  2:00 PM CDT   US PARACENTESIS with GOSIA, BRYAN IMAGING NURSE, BRYAN BODY RAD, BRYAN BODY RAD   [facility]trasound ([facility])    [address] [place] [address] [phone]           How do I prepare for my exam? (Food and drink instructions) No Food and Drink Restrictions.  How do I prepare for my exam? (Other instructions) IF YOUR DOCTOR ALSO PRESCRIBED SEDATION DURING THE EXAM (medicine to help you relax): You will receive separate instructions about driving, eating, and additional tests that may be necessary prior to your exam day.  What should I wear: Wear comfortable clothes.  How long does the exam take: Aspiration (no sedation treatment takes about an hour.  For paracentesis, thoracentesis or sedation plan to spend at least three hours at the hospital.  What should I bring: Bring a list of your medicines, including vitamins, minerals and over-the-counter drugs. It is safest to leave personal items at home.  Do I need a :  No  is needed.  What do I need to tell my doctor: Tell your doctor in advance: * If you are or may be pregnant. * If you are taking Coumadin (or any other blood thinners) 5 days prior to the exam for any special instructions. * If you are diabetic to determine if your insulin needs have to be adjusted for the exam.  What should I do after the exam: Take it easy the rest of the day. You can return to normal activities the next day.  What is this test: This test uses a long, thin needle or tube to remove tissue, fluid, or other cells from your body. Pictures from an ultrasound will guide the needle to the right place. (Ultrasound uses sound waves to create pictures of the body on a video  screen. You will not feel the sound waves.)  * A biopsy removes tissue or other cells from the body; we send the tissue or cells to a lab for testing. * Paracentesis removes fluid from the belly (abdomen) to relieve pressure, to test the fluid or both. * Thoracentesis removes fluid from the sac around the lungs to relieve pressure, to test the fluid or both. * Aspiration removes fluid from any part of the body, then the fluid is tested for disease or infection.  Who should I call with questions: If you have any questions, please call the Imaging Department where you will have your exam. Directions, parking instructions, and other information is available on our website, [email or web address].            Nov 08, 2018  4:00 PM CST   Pre-Op physical with Main Hardy MD   [facility]    [address] [facility] [address] [phone]            Nov 27, 2018   Procedure with Saima Howard MD   [facility] (--)    [address]  [facility] [address] [phone]               Care Instructions        Further instructions from your care team       Paracentesis Discharge Instructions     After you go home:      You may resume your normal diet.    Care of Puncture Site:      For the first 48 hrs, check your puncture site every couple hours while you are awake     If there is a bandaid - you may remove it tomorrow morning    You may shower tomorrow    No tub baths, whirlpools or swimming until your puncture site has fully healed    Fluid may leak from the site. Change the bandaid as needed - keep the site dry    If the fluid leaks for more than 48 hours, call your ordering provider     Activity:      You may go back to normal activity in 24 hours     Wait 48 hours before lifting, straining, exercise or other strenuous activity    Medicines:      You may resume all your medications    For minor pain, you may take Acetaminophen (Tylenol)  or Ibuprofen (Advil)            Call the provider who ordered this procedure if:      The site is red, swollen, hot or tender    Blood or fluid is draining from the site    Chills or a fever greater than 101 F (38 C)    Pain that is getting worse    Leaking from the site that does not stop    Any questions or concerns      If you have questions call:        [facility] Radiology Dept @ [phone]      The provider who performed your procedure was Dr. Cardona.     Additional Information About Your Visit        [facility] Information     [employer] gives you secure access to your electronic health record. If you see a primary care provider, you can also send messages to your care team and make appointments. If you have questions, please call your primary care clinic.  If you do not have a primary care provider, please call [phone] and they will assist you.        Care EveryWhere ID     This is your Care EveryWhere ID. This could be used by other organizations to access your [facility] medical records  [id number]        Your Vitals Were     Blood Pressure Pulse Temperature Respirations Pulse Oximetry       144/63 (BP Location: Right arm) 59 96  F (35.6  C) (Oral) 18 98%        Primary Care Provider Office Phone # Fax #    Main Hardy [phone]      Equal Access to Services     [facility] AH: Hadii peyton munguia hadasho Soomaali, waaxda luqadaha, qaybta kaalmada adeegyada, igor mix. So [facility] [phone].    ATENCIÓN: Si habla español, tiene a shrestha disposición servicios gratuitos de asistencia lingüística. Llame al [phone].    We comply with applicable federal civil rights laws and [place] laws. We do not discriminate on the basis of race, color, national origin, age, disability, sex, sexual orientation, or gender identity.            Thank you!     Thank you for choosing [facility] for your care. Our goal is always to provide you with excellent care. Hearing  "back from our patients is one way we can continue to improve our services. Please take a few minutes to complete the written survey that you may receive in the mail after you visit with us. Thank you!             Medication List: This is a list of all your medications and when to take them. Check marks below indicate your daily home schedule. Keep this list as a reference.      Medications           Morning Afternoon Evening Bedtime As Needed    aspirin 81 MG tablet   Take 81 mg by mouth daily                                carvedilol 3.125 MG tablet   Commonly known as:  COREG   Take 1 tablet (3.125 mg) by mouth 2 times daily (with meals)                                gabapentin 600 MG tablet   Commonly known as:  NEURONTIN   Take 1 tablet (600 mg) by mouth 3 times daily                                ipratropium 0.06 % spray   Commonly known as:  ATROVENT   Spray 2 sprays into both nostrils 4 times daily as needed for rhinitis                                phenazopyridine 100 MG tablet   Commonly known as:  PYRIDIUM   Take 1 tablet (100 mg) by mouth 3 times daily as needed for urinary tract discomfort                                sulfamethoxazole-trimethoprim 800-160 MG per tablet   Commonly known as:  BACTRIM DS/SEPTRA DS   Take 1 tablet by mouth 2 times daily                                TEGADERM + PAD 3-1/2\"X4\" Misc   1 Application every 3 days                                torsemide 20 MG tablet   Commonly known as:  DEMADEX   Take 20 mg by mouth daily                                  "

## 2018-10-30 NOTE — PROGRESS NOTES
Ultrasound guided paracentesis completed per Dr Cardona. VSS. No c/o. Drained 3300 light cherry colored fluid. Site D/I.    1445 Back to room post procedure. VSS. Taking lemon lime pop. Site D/I.

## 2018-10-30 NOTE — PROGRESS NOTES
Pt arrived ~1300.  VSS.  Admission complete.  Discharge instructions reviewed.  Questions answered.

## 2018-10-30 NOTE — PROGRESS NOTES
RADIOLOGY PROCEDURE NOTE  Patient name: Antonio Ramirez  MRN: [id number]  : 1945    Pre-procedure diagnosis: Ascites  Post-procedure diagnosis: Same    Procedure Date/Time: 2018  2:31 PM  Procedure: Paracentesis  Estimated blood loss: None  Specimen(s) collected with description: Ascites.  The patient tolerated the procedure well with no immediate complications.    See imaging dictation for procedural details and findings.    Provider name: Juarez Cardona  Assistant(s):None

## 2018-10-30 NOTE — DISCHARGE INSTRUCTIONS

## 2018-10-31 ENCOUNTER — HOSPITAL ENCOUNTER (EMERGENCY)
Facility: CLINIC | Age: 73
Discharge: HOME | End: 2018-10-31
Attending: EMERGENCY MEDICINE | Admitting: EMERGENCY MEDICINE
Payer: MEDICARE

## 2018-10-31 VITALS
RESPIRATION RATE: 18 BRPM | SYSTOLIC BLOOD PRESSURE: 145 MMHG | TEMPERATURE: 97.4 F | OXYGEN SATURATION: 98 % | DIASTOLIC BLOOD PRESSURE: 81 MMHG

## 2018-10-31 DIAGNOSIS — R00.1 BRADYCARDIA: ICD-10-CM

## 2018-10-31 DIAGNOSIS — E16.2 HYPOGLYCEMIA: ICD-10-CM

## 2018-10-31 LAB
ANION GAP SERPL CALCULATED.3IONS-SCNC: 9 MMOL/L (ref 3–14)
ANION GAP SERPL CALCULATED.3IONS-SCNC: NORMAL MMOL/L (ref 6–17)
BASOPHILS # BLD AUTO: 0 10E9/L (ref 0–0.2)
BASOPHILS NFR BLD AUTO: 1.2 %
BUN SERPL-MCNC: 11 MG/DL (ref 7–30)
BUN SERPL-MCNC: NORMAL MG/DL (ref 7–30)
CALCIUM SERPL-MCNC: 8.4 MG/DL (ref 8.5–10.1)
CALCIUM SERPL-MCNC: NORMAL MG/DL (ref 8.5–10.1)
CHLORIDE SERPL-SCNC: 107 MMOL/L (ref 94–109)
CHLORIDE SERPL-SCNC: NORMAL MMOL/L (ref 94–109)
CO2 SERPL-SCNC: 24 MMOL/L (ref 20–32)
CO2 SERPL-SCNC: NORMAL MMOL/L (ref 20–32)
CREAT SERPL-MCNC: 0.92 MG/DL (ref 0.66–1.25)
CREAT SERPL-MCNC: NORMAL MG/DL (ref 0.66–1.25)
DIFFERENTIAL METHOD BLD: ABNORMAL
EOSINOPHIL # BLD AUTO: 0 10E9/L (ref 0–0.7)
EOSINOPHIL NFR BLD AUTO: 0.8 %
ERYTHROCYTE [DISTWIDTH] IN BLOOD BY AUTOMATED COUNT: 14.2 % (ref 10–15)
ETHANOL SERPL-MCNC: 0.1 G/DL
GFR SERPL CREATININE-BSD FRML MDRD: 80 ML/MIN/1.7M2
GFR SERPL CREATININE-BSD FRML MDRD: NORMAL ML/MIN/1.7M2
GLUCOSE BLDC GLUCOMTR-MCNC: 128 MG/DL (ref 70–99)
GLUCOSE BLDC GLUCOMTR-MCNC: 61 MG/DL (ref 70–99)
GLUCOSE BLDC GLUCOMTR-MCNC: 73 MG/DL (ref 70–99)
GLUCOSE BLDC GLUCOMTR-MCNC: 81 MG/DL (ref 70–99)
GLUCOSE SERPL-MCNC: 61 MG/DL (ref 70–99)
GLUCOSE SERPL-MCNC: NORMAL MG/DL (ref 70–99)
HCT VFR BLD AUTO: 36.8 % (ref 40–53)
HGB BLD-MCNC: 12.3 G/DL (ref 13.3–17.7)
IMM GRANULOCYTES # BLD: 0 10E9/L (ref 0–0.4)
IMM GRANULOCYTES NFR BLD: 0.4 %
INTERPRETATION ECG - MUSE: NORMAL
LYMPHOCYTES # BLD AUTO: 0.5 10E9/L (ref 0.8–5.3)
LYMPHOCYTES NFR BLD AUTO: 19.2 %
MAGNESIUM SERPL-MCNC: 1.8 MG/DL (ref 1.6–2.3)
MAGNESIUM SERPL-MCNC: NORMAL MG/DL (ref 1.6–2.3)
MCH RBC QN AUTO: 32.8 PG (ref 26.5–33)
MCHC RBC AUTO-ENTMCNC: 33.4 G/DL (ref 31.5–36.5)
MCV RBC AUTO: 98 FL (ref 78–100)
MONOCYTES # BLD AUTO: 0.3 10E9/L (ref 0–1.3)
MONOCYTES NFR BLD AUTO: 11.8 %
NEUTROPHILS # BLD AUTO: 1.7 10E9/L (ref 1.6–8.3)
NEUTROPHILS NFR BLD AUTO: 66.6 %
NRBC # BLD AUTO: 0 10*3/UL
NRBC BLD AUTO-RTO: 0 /100
PLATELET # BLD AUTO: 77 10E9/L (ref 150–450)
POTASSIUM SERPL-SCNC: 4.1 MMOL/L (ref 3.4–5.3)
POTASSIUM SERPL-SCNC: NORMAL MMOL/L (ref 3.4–5.3)
RBC # BLD AUTO: 3.75 10E12/L (ref 4.4–5.9)
SODIUM SERPL-SCNC: 140 MMOL/L (ref 133–144)
SODIUM SERPL-SCNC: NORMAL MMOL/L (ref 133–144)
TROPONIN I SERPL-MCNC: 0.02 UG/L (ref 0–0.04)
TROPONIN I SERPL-MCNC: NORMAL UG/L (ref 0–0.04)
TSH SERPL DL<=0.005 MIU/L-ACNC: 0.86 MU/L (ref 0.4–4)
TSH SERPL DL<=0.005 MIU/L-ACNC: NORMAL MU/L (ref 0.4–4)
WBC # BLD AUTO: 2.6 10E9/L (ref 4–11)

## 2018-10-31 PROCEDURE — 80320 DRUG SCREEN QUANTALCOHOLS: CPT | Performed by: EMERGENCY MEDICINE

## 2018-10-31 PROCEDURE — 25000128 H RX IP 250 OP 636: Performed by: EMERGENCY MEDICINE

## 2018-10-31 PROCEDURE — 25000125 ZZHC RX 250: Performed by: EMERGENCY MEDICINE

## 2018-10-31 PROCEDURE — 80048 BASIC METABOLIC PNL TOTAL CA: CPT | Performed by: EMERGENCY MEDICINE

## 2018-10-31 PROCEDURE — 25800025 ZZH RX 258: Performed by: EMERGENCY MEDICINE

## 2018-10-31 PROCEDURE — 00000146 ZZHCL STATISTIC GLUCOSE BY METER IP

## 2018-10-31 PROCEDURE — 84484 ASSAY OF TROPONIN QUANT: CPT | Performed by: EMERGENCY MEDICINE

## 2018-10-31 PROCEDURE — 85025 COMPLETE CBC W/AUTO DIFF WBC: CPT | Performed by: EMERGENCY MEDICINE

## 2018-10-31 PROCEDURE — 99284 EMERGENCY DEPT VISIT MOD MDM: CPT | Mod: 25

## 2018-10-31 PROCEDURE — 96374 THER/PROPH/DIAG INJ IV PUSH: CPT

## 2018-10-31 PROCEDURE — 84443 ASSAY THYROID STIM HORMONE: CPT | Performed by: EMERGENCY MEDICINE

## 2018-10-31 PROCEDURE — 96375 TX/PRO/DX INJ NEW DRUG ADDON: CPT

## 2018-10-31 PROCEDURE — 93005 ELECTROCARDIOGRAM TRACING: CPT

## 2018-10-31 PROCEDURE — 83735 ASSAY OF MAGNESIUM: CPT | Performed by: EMERGENCY MEDICINE

## 2018-10-31 RX ORDER — THIAMINE HYDROCHLORIDE 100 MG/ML
100 INJECTION, SOLUTION INTRAMUSCULAR; INTRAVENOUS ONCE
Status: COMPLETED | OUTPATIENT
Start: 2018-10-31 | End: 2018-10-31

## 2018-10-31 RX ORDER — ASCORBIC ACID, VITAMIN A PALMITATE, CHOLECALCIFEROL, THIAMINE HYDROCHLORIDE, RIBOFLAVIN 5-PHOSPHATE SODIUM, PYRIDOXINE HYDROCHLORIDE, NIACINAMIDE, DEXPANTHENOL, ALPHA-TOCOPHEROL ACETATE, VITAMIN K1, FOLIC ACID, BIOTIN, CYANOCOBALAMIN 80; 2300; 400; 1.2; 1.4; 1; 17; 5; 7; .2; 140; 20; 1 MG/5ML; [IU]/5ML; [IU]/5ML; MG/5ML; MG/5ML; MG/5ML; MG/5ML; MG/5ML; [IU]/5ML; MG/5ML; UG/5ML; UG/5ML; UG/5ML
10 INJECTION, SOLUTION INTRAVENOUS ONCE
Status: DISCONTINUED | OUTPATIENT
Start: 2018-10-31 | End: 2018-10-31

## 2018-10-31 RX ORDER — DEXTROSE MONOHYDRATE 25 G/50ML
25-50 INJECTION, SOLUTION INTRAVENOUS
Status: COMPLETED | OUTPATIENT
Start: 2018-10-31 | End: 2018-10-31

## 2018-10-31 RX ORDER — FOLIC ACID 5 MG/ML
1 INJECTION, SOLUTION INTRAMUSCULAR; INTRAVENOUS; SUBCUTANEOUS ONCE
Status: COMPLETED | OUTPATIENT
Start: 2018-10-31 | End: 2018-10-31

## 2018-10-31 RX ADMIN — DEXTROSE MONOHYDRATE 50 ML: 25 INJECTION, SOLUTION INTRAVENOUS at 17:46

## 2018-10-31 RX ADMIN — FOLIC ACID 1 MG: 5 INJECTION, SOLUTION INTRAMUSCULAR; INTRAVENOUS; SUBCUTANEOUS at 19:12

## 2018-10-31 RX ADMIN — THIAMINE HYDROCHLORIDE 100 MG: 100 INJECTION, SOLUTION INTRAMUSCULAR; INTRAVENOUS at 19:12

## 2018-10-31 ASSESSMENT — ENCOUNTER SYMPTOMS
CONFUSION: 1
HEADACHES: 0
VOMITING: 0
NAUSEA: 0
DECREASED CONCENTRATION: 1
COLOR CHANGE: 0
SHORTNESS OF BREATH: 0

## 2018-10-31 NOTE — ED PROVIDER NOTES
History     Chief Complaint:    Hypoglycemia      HPI   Antonio Ramirez is a 73 year old male on Xarelto with a complex medical and surgical history significant for alcoholism, CAD, HTN, MI, PE, renal disease, liver disease, subdural hematoma, prediabtes, and a left lower leg amputation who presents to the ED for evaluation of hypoglycemia. The patient's spouse states that the patient did not wake up on his own this morning and that she was unable to wake him up. She subsequently called EMS. She was able to wake him up but reports that he was very groggy, pale, and not responding to questions. EMS states that he had an initial blood sugar of 20, bradycardic with a heart rate in the low 40's, and initial blood pressure of 140 systolic. EMS administered D50 and report that his blood sugar went up to 156 and his mentation returned to baseline. Patient is still bradycardic on arrival but his blood pressure was down to 110 systolic. His wife notes that he has not been eating lately. The patient otherwise denies headache, vision changes, leg pain, shortness of breath, nausea, vomit, diarrhea, falls, or redness at his amputation site. Of note, he states that he had a paracentesis yesterday for ascites secondary to alcoholism. Patient notes he drinks about 5-6 drinks of vodka per day.     Allergies:  Ciprofloxacin  HMG-Coa-R inhibitors     Medications:    Aspirin  Coreg  Flonase  Gabapentin  Dilaudid  Xarelto  Demadex     Past Medical History:    Alcoholism  Amputated left leg  Anemia  Anticardiolipin antibody syndrome  Antiphospholipid antibody syndrome  Aortic root dilation  Aortic stenosis  Arthritis  CAD  GERD  Heart attack  Hiatal hernia  HTN  Hyperlipidemia  Ischemic cardiomyopathy  Left foot drop  Liver disease  Low grade B cell lymphoproliferative disorder  Moderate mitral regurgitation  Monoclonal gammopathy  Neuropathy  Noninfectious ileitis  Nonrheumatic tricuspid valve regurgitation  Paroxysmal  a-fib  PE  Prostate cancer  Renal disease  Subdural hematoma  Thrombocytopenia  Urethral stricture  Venous thrombosis  Prediabetes    Past Surgical History:    Amputate left leg below knee  Appendectomy  Apply wound vac  Knee arthroplasty  CABG  Cholecystectomy  Colonoscopy  Combined cystoscopy, retrogrades, exchange stent ureter(s)  Craniotomy  Cytoscopy dilate urethra  Cystoscopy remove stent(s), combined  Endoscopic vein stripping  Cataract surgery  Prostate seen implants  Umbilical hernia repair  Incision and drainage lower extremity, combined  IVC filter placement  Irrigation and debridement lower extremity, combined (x2)  Laser holmium lithotripsy ureter(s), insert stent, combined (x2)  Right knee scope  Total hip replacement  Elbow surgery    Family History:    Lung cancer  Heart failure    Social History:  Negative for tobacco use.  Positive for alcohol use.   Marital Status:   [2]     Review of Systems   Eyes: Negative for visual disturbance.   Respiratory: Negative for shortness of breath.    Gastrointestinal: Negative for nausea and vomiting.   Skin: Negative for color change.   Neurological: Negative for headaches.   Psychiatric/Behavioral: Positive for confusion and decreased concentration.   All other systems reviewed and are negative.      Physical Exam     Patient Vitals for the past 24 hrs:   BP Temp Temp src Heart Rate Resp SpO2   10/31/18 2115 145/81 - - 56 18 98 %   10/31/18 2108 - 97.4  F (36.3  C) Temporal - - -   10/31/18 2100 144/89 - - 55 15 98 %   10/31/18 2045 150/89 - - 55 16 96 %   10/31/18 2030 144/76 - - (!) 40 15 98 %   10/31/18 2015 144/82 - - (!) 47 11 99 %   10/31/18 2000 166/75 - - (!) 47 22 96 %   10/31/18 1945 160/84 - - 52 12 97 %   10/31/18 1930 113/67 - - (!) 46 16 99 %   10/31/18 1915 (!) 137/99 - - (!) 48 11 98 %   10/31/18 1900 133/82 - - (!) 40 14 100 %   10/31/18 1859 - - - (!) 35 10 99 %   10/31/18 1845 145/88 - - 52 23 98 %   10/31/18 1830 117/80 - - (!) 44 17  97 %   10/31/18 1815 128/70 - - (!) 40 15 94 %   10/31/18 1800 125/63 - - (!) 42 12 99 %   10/31/18 1745 109/80 - - (!) 42 13 96 %   10/31/18 1730 115/74 - - (!) 39 13 95 %   10/31/18 1715 118/66 - - (!) 42 16 92 %   10/31/18 1700 128/73 - - (!) 41 12 -   10/31/18 1645 120/80 - - (!) 42 9 98 %   10/31/18 1640 120/80 - - (!) 44 14 98 %         Physical Exam  Constitutional: Well developed, nontox appearance  Head: Atraumatic.   Mouth/Throat: Oropharynx is clear and moist.   Neck:  no stridor  Eyes: no scleral icterus  Cardiovascular: Reg bradycardia, 2+ bilat radial, DP pulses  Pulmonary/Chest: nml resp effort, Clear BS bilat  Abdominal: Mild fluid wave, paracentesis site to L abd CDI, +BS, soft, NT, no rebound or guarding   : no CVA tenderness bilat  Ext: Warm, well perfused, no edema.  Baseline L below knee amp  Neurological: A&Ox3, symmetric facies, moves ext x4  Skin: Skin is warm and dry.   Psychiatric: Behavior is normal. Thought content normal.   Nursing note and vitals reviewed.        Emergency Department Course   ECG:  Indication: Bradycardia  Time: 1652  Vent. Rate 50 bpm. HI interval *. QRS duration 90. QT/QTc 592/539. P-R-T axis * 92 88.  Atrial fibrillation with slow ventricular response. Rightward axis. Nonspecific T wave abnormality. Prolonged QT. Abnormal ECG. No significant change compared to EKG dated 10/2/18. Read time: 1700      Laboratory:  CBC: WBC: 2.6 (L), HGB: 12.3 (L), PLT: 77 (L)  BMP: Glucose 61 (L), Calcium 8.4 (L), o/w WNL (Creatinine: 0.92)    Magnesium: 1.8  TSH: 0.86  1742 Troponin: 0.023  MELISSA: 0.140 (H)    1657 Glucose by meter: 81  1742 Glucose by meter: 61 (L)  1828 Glucose by meter: 128 (H)  2106 Glucose by meter: 73      Interventions:  1746 D50 50 mLs IV  1912 Thiamine 100 mg IV   Folic acid 1 mg IV      Emergency Department Course:  Nursing notes and vitals reviewed. (5473) I performed an exam of the patient as documented above.     EKG obtained in the ED, see results above.      IV inserted. Medicine administered as documented above. Blood drawn. This was sent to the lab for further testing, results above.      I rechecked the patient and discussed the results of his workup thus far.     Findings and plan explained to the Patient. Patient discharged home with instructions regarding supportive care, medications, and reasons to return. The importance of close follow-up was reviewed.     I personally reviewed the laboratory results with the Patient and answered all related questions prior to discharge.     Impression & Plan    Medical Decision Makin y.o. M presenting with AMS, hypoglycemia now improved    Differential diagnosis includes hypoglycemia NOS, malnutrition, arrhythmia, electrolyte abnormality, ACS.  Patient has returned to his baseline after receiving D50 in route by EMS.  He has no complaints at this time.  Seems likely that he is experiencing an acute intracranial abnormality such as hemorrhage or trauma given there is no evidence of trauma on physical exam the patient denies ache.  Doubt SBP given the patient's lack of abdominal pain or tenderness, no fever.  EKG interpretation as noted above and unchanged from previous EKG which also demonstrated bradycardic atrial fibrillation.  Labs ordered as noted above and unremarkable other than mildly elevated ethanol level, leukopenia.  Patient denies any suicide attempts or overdose on his medications.  He was given dextrose in the emergency department for mild hypoglycemic events but blood glucose levels eventually eventually stabilized.  Her nursing report the patient's heart rate dipped into the 30s on 2 occasions during his ED stay.  Given the patient's past medical history, episodes of bradycardic although not symmetric and hypoglycemia, I think would be reasonable to admit the patient for observation.  I offered and recommended observation admission but the patient declined after discussion of risks of returning home  including further hypoglycemic events, cardiac arrhythmia, severe disability and death.  Patient is clinically sober and he reports understanding of risks.  It is likely the patient's presentation is in large part due to malnutrition given he reports not eating and drinking much at home.  He is encouraged to eat more any supplemental shakes such as Ensure.  He was subsequently discharged with recommendations to follow-up with his primary care doctor.  Recommendations also given regarding return to the emergency department as needed for new or worsening symptoms.  Patient was counseled on results, diagnosis and disposition prior to discharge.  Discharged in stable condition.    Critical Care time:  none    Diagnosis:    ICD-10-CM    1. Bradycardia R00.1 Glucose by meter     Glucose by meter   2. Hypoglycemia E16.2        Disposition:  discharged to home      Scribe Disclosure:  I,  Spencer Lei, am serving as a scribe on 10/31/2018 at 4:33 PM to personally document services performed by Jg Lawson MD based on my observations and the provider's statements to me.        Spencer Lei  10/31/2018    EMERGENCY DEPARTMENT       Jg Lawson MD  11/01/18 1444

## 2018-10-31 NOTE — ED AVS SNAPSHOT
Emergency Department    [address] [facility] [address]    Phone:  [phone]    Fax:  [phone]                                       Antonio Ramirez   MRN: [id number]    Department:   Emergency Department   Date of Visit:  10/31/2018           Patient Information     Date Of Birth          1945        Your diagnoses for this visit were:     Bradycardia     Hypoglycemia        You were seen by Jg Lawson MD.      Follow-up Information     Follow up with  Emergency Department.    Specialty:  EMERGENCY MEDICINE    Why:  As needed    Contact information:    [address] [facility] [address] [phone]        Follow up with Main Hardy MD In 2 days.    Specialty:  Internal Medicine    Contact information:    [address] [facility] [address]  [facility] [address] [phone]          Discharge Instructions       Please eat some food at home.    Please follow-up with your primary doctor in 2 days for recheck.    Please return to the ED as needed for new or worsening symptoms such as increased unresponsiveness, fainting, chest pain, shortness of breath, confusion, abdominal pain, fever greater than 100.4 F, any other concerning symptoms.    Your next 10 appointments already scheduled     Nov 08, 2018  4:00 PM CST   [facility] physical with Main [facility] MD   [facility]    [address] [facility] [address] [phone]            Nov 27, 2018   Procedure with Saima Howard MD   [facility] (--)    [address]  [facility] [address] [phone]              24 Hour Appointment Hotline       To make an appointment at any [facility], call [phone]). If you don't have a family doctor or clinic, we will help you find one. [facility] are conveniently located to serve the needs of you and your family.             Review of your medicines      Our  "records show that you are taking the medicines listed below. If these are incorrect, please call your family doctor or clinic.        Dose / Directions Last dose taken    aspirin 81 MG tablet   Dose:  81 mg        Take 81 mg by mouth daily   Refills:  0        carvedilol 3.125 MG tablet   Commonly known as:  COREG   Dose:  3.125 mg   Quantity:  180 tablet        Take 1 tablet (3.125 mg) by mouth 2 times daily (with meals)   Refills:  1        DILAUDID PO   Dose:  2 mg        Take 2 mg by mouth every 6 hours as needed for moderate to severe pain   Refills:  0        fluticasone 50 MCG/ACT spray   Commonly known as:  FLONASE   Dose:  1 spray        Spray 1 spray into both nostrils daily   Refills:  0        gabapentin 600 MG tablet   Commonly known as:  NEURONTIN   Dose:  600 mg   Quantity:  270 tablet        Take 1 tablet (600 mg) by mouth 3 times daily   Refills:  3        TEGADERM + PAD 3-1/2\"X4\" Misc   Dose:  1 Application   Quantity:  6 each        1 Application every 3 days   Refills:  0        torsemide 20 MG tablet   Commonly known as:  DEMADEX   Dose:  20 mg        Take 20 mg by mouth daily   Refills:  0        XARELTO PO   Dose:  20 mg        Take 20 mg by mouth daily   Refills:  0                Procedures and tests performed during your visit     Procedure/Test Number of Times Performed    Alcohol ethyl 1    Basic metabolic panel 2    CBC with platelets differential 1    EKG 12-lead, tracing only 1    Glucose by meter 4    Glucose monitor nursing POCT 1    Magnesium 2    TSH with free T4 reflex 2    Troponin I 2      Orders Needing Specimen Collection     None      Pending Results     No orders found from 10/29/2018 to 11/1/2018.            Pending Culture Results     No orders found from 10/29/2018 to 11/1/2018.            Pending Results Instructions     If you had any lab results that were not finalized at the time of your Discharge, you can call the ED Lab Result RN at [phone]. You will be " contacted by this team for any positive Lab results or changes in treatment. The nurses are available 7 days a week from 10A to 6:30P.  You can leave a message 24 hours per day and they will return your call.        Test Results From Your Hospital Stay        10/31/2018  5:12 PM      Component Results     Component Value Ref Range & Units Status    WBC 2.6 (L) 4.0 - 11.0 10e9/L Final    RBC Count 3.75 (L) 4.4 - 5.9 10e12/L Final    Hemoglobin 12.3 (L) 13.3 - 17.7 g/dL Final    Hematocrit 36.8 (L) 40.0 - 53.0 % Final    MCV 98 78 - 100 fl Final    MCH 32.8 26.5 - 33.0 pg Final    MCHC 33.4 31.5 - 36.5 g/dL Final    RDW 14.2 10.0 - 15.0 % Final    Platelet Count 77 (L) 150 - 450 10e9/L Final    Diff Method Automated Method  Final    % Neutrophils 66.6 % Final    % Lymphocytes 19.2 % Final    % Monocytes 11.8 % Final    % Eosinophils 0.8 % Final    % Basophils 1.2 % Final    % Immature Granulocytes 0.4 % Final    Nucleated RBCs 0 0 /100 Final    Absolute Neutrophil 1.7 1.6 - 8.3 10e9/L Final    Absolute Lymphocytes 0.5 (L) 0.8 - 5.3 10e9/L Final    Absolute Monocytes 0.3 0.0 - 1.3 10e9/L Final    Absolute Eosinophils 0.0 0.0 - 0.7 10e9/L Final    Absolute Basophils 0.0 0.0 - 0.2 10e9/L Final    Abs Immature Granulocytes 0.0 0 - 0.4 10e9/L Final    Absolute Nucleated RBC 0.0  Final         10/31/2018  5:35 PM      Component Results     Component Value Ref Range & Units Status    Sodium  133 - 144 mmol/L Final    Canceled, Test credited, specimen discarded    Unsatisfactory specimen - hemolyzed    Potassium  3.4 - 5.3 mmol/L Final    Canceled, Test credited, specimen discarded    Unsatisfactory specimen - hemolyzed    Chloride  94 - 109 mmol/L Final    Canceled, Test credited, specimen discarded    Unsatisfactory specimen - hemolyzed    Carbon Dioxide  20 - 32 mmol/L Final    Canceled, Test credited, specimen discarded    Unsatisfactory specimen - hemolyzed    Anion Gap  6 - 17 mmol/L Final    Canceled, Test credited,  specimen discarded    Unsatisfactory specimen - hemolyzed    Glucose  70 - 99 mg/dL Final    Canceled, Test credited, specimen discarded    Unsatisfactory specimen - hemolyzed    Urea Nitrogen  7 - 30 mg/dL Final    Canceled, Test credited, specimen discarded    Unsatisfactory specimen - hemolyzed    Creatinine  0.66 - 1.25 mg/dL Final    Canceled, Test credited, specimen discarded    Unsatisfactory specimen - hemolyzed    GFR Estimate  >60 mL/min/1.7m2 Final    Canceled, Test credited, specimen discarded    Unsatisfactory specimen - hemolyzed    GFR Estimate If Black  >60 mL/min/1.7m2 Final    Canceled, Test credited, specimen discarded    Unsatisfactory specimen - hemolyzed    Calcium  8.5 - 10.1 mg/dL Final    Canceled, Test credited, specimen discarded    Unsatisfactory specimen - hemolyzed         10/31/2018  5:35 PM      Component Results     Component Value Ref Range & Units Status    Magnesium  1.6 - 2.3 mg/dL Final    Canceled, Test credited, specimen discarded    Unsatisfactory specimen - hemolyzed         10/31/2018  5:35 PM      Component Results     Component Value Ref Range & Units Status    Troponin I ES  0.000 - 0.045 ug/L Final    Canceled, Test credited, specimen discarded    Unsatisfactory specimen - hemolyzed         10/31/2018  5:35 PM      Component Results     Component Value Ref Range & Units Status    TSH  0.40 - 4.00 mU/L Final    Canceled, Test credited, specimen discarded    Unsatisfactory specimen - hemolyzed         10/31/2018  5:08 PM      Component Results     Component Value Ref Range & Units Status    Glucose 81 70 - 99 mg/dL Final         10/31/2018  5:54 PM      Component Results     Component Value Ref Range & Units Status    Glucose 61 (L) 70 - 99 mg/dL Final         10/31/2018  6:39 PM      Component Results     Component Value Ref Range & Units Status    Glucose 128 (H) 70 - 99 mg/dL Final         10/31/2018  7:55 PM      Component Results     Component Value Ref Range & Units  Status    Sodium 140 133 - 144 mmol/L Final    Potassium 4.1 3.4 - 5.3 mmol/L Final    Specimen slightly hemolyzed, potassium may be falsely elevated    Chloride 107 94 - 109 mmol/L Final    Carbon Dioxide 24 20 - 32 mmol/L Final    Anion Gap 9 3 - 14 mmol/L Final    Glucose 61 (L) 70 - 99 mg/dL Final    Urea Nitrogen 11 7 - 30 mg/dL Final    Creatinine 0.92 0.66 - 1.25 mg/dL Final    GFR Estimate 80 >60 mL/min/1.7m2 Final    Non  GFR Calc    GFR Estimate If Black >90 >60 mL/min/1.7m2 Final    African American GFR Calc    Calcium 8.4 (L) 8.5 - 10.1 mg/dL Final         10/31/2018  7:55 PM      Component Results     Component Value Ref Range & Units Status    Magnesium 1.8 1.6 - 2.3 mg/dL Final         10/31/2018  8:02 PM      Component Results     Component Value Ref Range & Units Status    Troponin I ES 0.023 0.000 - 0.045 ug/L Final    The 99th percentile for upper reference range is 0.045 ug/L.  Troponin values   in the range of 0.045 - 0.120 ug/L may be associated with risks of adverse   clinical events.           10/31/2018  8:02 PM      Component Results     Component Value Ref Range & Units Status    TSH 0.86 0.40 - 4.00 mU/L Final         10/31/2018  7:55 PM      Component Results     Component Value Ref Range & Units Status    Ethanol g/dL 0.10 (H) <0.01 g/dL Final         10/31/2018  9:18 PM      Component Results     Component Value Ref Range & Units Status    Glucose 73 70 - 99 mg/dL Final                Clinical Quality Measure: Blood Pressure Screening     Your blood pressure was checked while you were in the emergency department today. The last reading we obtained was  BP: 145/81 . Please read the guidelines below about what these numbers mean and what you should do about them.  If your systolic blood pressure (the top number) is less than 120 and your diastolic blood pressure (the bottom number) is less than 80, then your blood pressure is normal. There is nothing more that you need to  do about it.  If your systolic blood pressure (the top number) is 120-139 or your diastolic blood pressure (the bottom number) is 80-89, your blood pressure may be higher than it should be. You should have your blood pressure rechecked within a year by a primary care provider.  If your systolic blood pressure (the top number) is 140 or greater or your diastolic blood pressure (the bottom number) is 90 or greater, you may have high blood pressure. High blood pressure is treatable, but if left untreated over time it can put you at risk for heart attack, stroke, or kidney failure. You should have your blood pressure rechecked by a primary care provider within the next 4 weeks.  If your provider in the emergency department today gave you specific instructions to follow-up with your doctor or provider even sooner than that, you should follow that instruction and not wait for up to 4 weeks for your follow-up visit.        Thank you for choosing [facility]       Thank you for choosing [facility] for your care. Our goal is always to provide you with excellent care. Hearing back from our patients is one way we can continue to improve our services. Please take a few minutes to complete the written survey that you may receive in the mail after you visit with us. Thank you!        [employer] Information     [employer] gives you secure access to your electronic health record. If you see a primary care provider, you can also send messages to your care team and make appointments. If you have questions, please call your primary care clinic.  If you do not have a primary care provider, please call [phone] and they will assist you.        Care EveryWhere ID     This is your Care EveryWhere ID. This could be used by other organizations to access your [facility] medical records  [id number]        Equal Access to Services     ANTHONY DORANTES : Evelin Cabrera, belén grande, igor moses  vandana miller ah. So [facility] [phone].    ATENCIÓN: Si habla español, tiene a shrestha disposición servicios gratuitos de asistencia lingüística. Llame al [phone].    We comply with applicable federal civil rights laws and [place] laws. We do not discriminate on the basis of race, color, national origin, age, disability, sex, sexual orientation, or gender identity.            After Visit Summary       This is your record. Keep this with you and show to your community pharmacist(s) and doctor(s) at your next visit.

## 2018-10-31 NOTE — ED AVS SNAPSHOT
Emergency Department    [address] [place] [address]    Phone:  [phone]    Fax:  [phone]                                       Antonio Ramirez   MRN: [id number]    Department:   Emergency Department   Date of Visit:  10/31/2018           After Visit Summary Signature Page     I have received my discharge instructions, and my questions have been answered. I have discussed any challenges I see with this plan with the nurse or doctor.    ..........................................................................................................................................  Patient/Patient Representative Signature      ..........................................................................................................................................  Patient Representative Print Name and Relationship to Patient    ..................................................               ................................................  Date                                   Time    ..........................................................................................................................................  Reviewed by Signature/Title    ...................................................              ..............................................  Date                                               Time          22EPIC Rev 08/18

## 2018-10-31 NOTE — ED NOTES
Pt's spouse arrived reports pt did not wake up this today and she was unable to wake him when she called 911.  She reported pin point pupils and was very groggy when EMS arrived. EMS reports after D50 was given pt then was awake and alert.  Pt awake, alert, grumpy, and oriented x4.

## 2018-10-31 NOTE — ED NOTES
Bed: ED12  Expected date:   Expected time:   Means of arrival:   Comments:  Opal 2 73/M Hypoglycemic

## 2018-11-01 NOTE — ED NOTES
Wife went home. She can be reached at [phone].  Pt wakes and states he wants to go home and there is nothing wrong with him.   No pertinent family history in first degree relatives

## 2018-11-01 NOTE — ED NOTES
MD at bedside. Pt request to discharge. MD explained the risk. Wife called and wanted pt to stay overnight but he continues to refuse admission. Pt irritable about not being warm enough while in the Ed although he has had the Ez hugger and multiple blankets from the time he arrived. Additional warm blanket was around his neck for comfort.  He reported to MD nursing did a poor job taking his temp.

## 2018-11-01 NOTE — DISCHARGE INSTRUCTIONS
Please eat some food at home.    Please follow-up with your primary doctor in 2 days for recheck.    Please return to the ED as needed for new or worsening symptoms such as increased unresponsiveness, fainting, chest pain, shortness of breath, confusion, abdominal pain, fever greater than 100.4 F, any other concerning symptoms.

## 2018-11-08 ENCOUNTER — OFFICE VISIT (OUTPATIENT)
Dept: FAMILY MEDICINE | Facility: CLINIC | Age: 73
End: 2018-11-08
Payer: MEDICARE

## 2018-11-08 VITALS
SYSTOLIC BLOOD PRESSURE: 128 MMHG | WEIGHT: 230.7 LBS | BODY MASS INDEX: 29.61 KG/M2 | TEMPERATURE: 98.5 F | OXYGEN SATURATION: 92 % | HEIGHT: 74 IN | DIASTOLIC BLOOD PRESSURE: 67 MMHG | HEART RATE: 58 BPM

## 2018-11-08 DIAGNOSIS — I10 BENIGN ESSENTIAL HYPERTENSION: ICD-10-CM

## 2018-11-08 DIAGNOSIS — F10.10 ALCOHOL ABUSE: ICD-10-CM

## 2018-11-08 DIAGNOSIS — I77.810 AORTIC ROOT DILATATION (H): ICD-10-CM

## 2018-11-08 DIAGNOSIS — D69.6 THROMBOCYTOPENIA (H): ICD-10-CM

## 2018-11-08 DIAGNOSIS — I25.10 CORONARY ARTERY DISEASE INVOLVING NATIVE CORONARY ARTERY OF NATIVE HEART WITHOUT ANGINA PECTORIS: ICD-10-CM

## 2018-11-08 DIAGNOSIS — C83.398 DIFFUSE LARGE B-CELL LYMPHOMA OF EXTRANODAL SITE: ICD-10-CM

## 2018-11-08 DIAGNOSIS — Z01.818 PREOP GENERAL PHYSICAL EXAM: Primary | ICD-10-CM

## 2018-11-08 DIAGNOSIS — D64.9 ANEMIA, UNSPECIFIED TYPE: ICD-10-CM

## 2018-11-08 DIAGNOSIS — N18.30 CHRONIC KIDNEY DISEASE, STAGE III (MODERATE) (H): ICD-10-CM

## 2018-11-08 DIAGNOSIS — M16.11 PRIMARY OSTEOARTHRITIS OF RIGHT HIP: ICD-10-CM

## 2018-11-08 DIAGNOSIS — D68.61 ANTIPHOSPHOLIPID ANTIBODY SYNDROME (H): ICD-10-CM

## 2018-11-08 DIAGNOSIS — K70.31 ALCOHOLIC CIRRHOSIS OF LIVER WITH ASCITES (H): ICD-10-CM

## 2018-11-08 DIAGNOSIS — I48.0 PAROXYSMAL ATRIAL FIBRILLATION (H): ICD-10-CM

## 2018-11-08 DIAGNOSIS — I25.5 ISCHEMIC CARDIOMYOPATHY: ICD-10-CM

## 2018-11-08 PROBLEM — Z98.890 S/P CRANIOTOMY: Status: RESOLVED | Noted: 2018-04-07 | Resolved: 2018-11-08

## 2018-11-08 PROBLEM — A41.9 SEPSIS (H): Status: RESOLVED | Noted: 2018-07-23 | Resolved: 2018-11-08

## 2018-11-08 LAB
ERYTHROCYTE [DISTWIDTH] IN BLOOD BY AUTOMATED COUNT: 14.8 % (ref 10–15)
HCT VFR BLD AUTO: 35.2 % (ref 40–53)
HGB BLD-MCNC: 11.5 G/DL (ref 13.3–17.7)
MCH RBC QN AUTO: 32.4 PG (ref 26.5–33)
MCHC RBC AUTO-ENTMCNC: 32.7 G/DL (ref 31.5–36.5)
MCV RBC AUTO: 99 FL (ref 78–100)
PLATELET # BLD AUTO: 102 10E9/L (ref 150–450)
RBC # BLD AUTO: 3.55 10E12/L (ref 4.4–5.9)
WBC # BLD AUTO: 3 10E9/L (ref 4–11)

## 2018-11-08 PROCEDURE — 85027 COMPLETE CBC AUTOMATED: CPT | Performed by: INTERNAL MEDICINE

## 2018-11-08 PROCEDURE — 99215 OFFICE O/P EST HI 40 MIN: CPT | Performed by: INTERNAL MEDICINE

## 2018-11-08 PROCEDURE — 80053 COMPREHEN METABOLIC PANEL: CPT | Performed by: INTERNAL MEDICINE

## 2018-11-08 PROCEDURE — 36415 COLL VENOUS BLD VENIPUNCTURE: CPT | Performed by: INTERNAL MEDICINE

## 2018-11-08 NOTE — PROGRESS NOTES
[facility]  [address] [place] [address]  [phone]  Dept: [phone]    PRE-OP EVALUATION:  Today's date: 2018    Antonio Ramirez (: 1945) presents for pre-operative evaluation assessment as requested by Saima Crockett MD.  He requires evaluation and anesthesia risk assessment prior to undergoing surgery/procedure for treatment of left hip osteoarthritis  .    Proposed Surgery/ Procedure: RIGHT TOTAL HIP ARTHROPLASTY (DEPUY)^  Date of Surgery/ Procedure: 2018  Time of Surgery/ Procedure: 7:30am  Hospital/Surgical Facility:  OR    Primary Physician: Main Hardy  Type of Anesthesia Anticipated: General    Patient has a Health Care Directive or Living Will:  NO    1. Yes - Do you have a history of heart attack, stroke, stent, bypass or surgery on an artery in the head, neck, heart or legs?  2. NO - Do you ever have any pain or discomfort in your chest?  3. NO - Do you have a history of  Heart Failure?  4. NO - Are you troubled by shortness of breath when: walking on the level, up a slight hill or at night?  5. NO - Do you currently have a cold, bronchitis or other respiratory infection?  6. NO - Do you have a cough, shortness of breath or wheezing?  7. NO - Do you sometimes get pains in the calves of your legs when you walk?  8. yes - Do you or anyone in your family have previous history of blood clots?  9. NO - Do you or does anyone in your family have a serious bleeding problem such as prolonged bleeding following surgeries or cuts?  10. yes - Have you ever had problems with anemia or been told to take iron pills?  11. NO - Have you had any abnormal blood loss such as black, tarry or bloody stools, or abnormal vaginal bleeding?  12. yes - Have you ever had a blood transfusion?  13. NO - Have you or any of your relatives ever had problems with anesthesia?  14. NO - Do you have sleep apnea, excessive snoring or daytime drowsiness?  15. NO - Do you  have any prosthetic heart valves?  16. yes - Do you have prosthetic joints?  17. NO - Is there any chance that you may be pregnant?      HPI:     HPI related to upcoming procedure: Sage is a 73-year-old retired  with multiple medical problems including cirrhosis with ascites from alcohol with ongoing alcohol use, status post right leg amputation, antiphospholipid antibody syndrome, history of recent subdural hematoma, history of recent hospitalizations for sepsis related to obstructing kidney stones, coronary artery disease status post coronary artery bypass grafting, ischemic cardiomyopathy, pulmonary hypertension, moderate aortic stenosis, mild aortic root dilation, moderate mitral insufficiency, tricuspid regurgitation, chronic atrial fibrillation, history of prostate cancer, history of anemia recently evaluated by his hematologist and thought to be related to chronic kidney disease, history of lymphoma.  He has been complaining of severe, quality of life limiting left hip pain for several years, but it has become unbearable over the last several months.  He is desperate to have his left hip replaced.  Due to various decompensation in his health status, his surgery date has had to be postponed.  This included hospitalizations for sepsis, anemia, concerns about non healing skin ulcers on left leg and lymphedema.  He presents for a preoperative evaluation hoping to have surgery on 11/27/18.  He was seen in the ER on 10/31 with lethargy and hypoglycemia.  His wife states that he was not eating well for several days prior to that visit.  It was also noted that he was drinking 5-7 vodka drinks per night by the ER physician, but the patient denies this and his wife corroborates that claim.  He states he is drinking 1-2 vodka drinks several days per week.  His wife has also encouraged better food intake since ER discharge.   He did recently establish with a cardiologist at [facility] and his cardiac  conditions were thought to be stable at the time.       MEDICAL HISTORY:     Patient Active Problem List    Diagnosis Date Noted     Anemia      Priority: Medium     Coronary artery disease involving native coronary artery of native heart without angina pectoris      Priority: Medium     CABG 2007: SVG to LAD, SVG to diagonal, SVG to OM; cardiac cath 5/17/18: thrombectomy for restenosis of stents-sent for urgent CABG, cath 5/14/2007: GRECIA x2 to LAD, Grecia to diagonal       Aortic stenosis      Priority: Medium     Nonrheumatic mitral valve regurgitation      Priority: Medium     Nonrheumatic tricuspid valve regurgitation      Priority: Medium     Pulmonary hypertension (H)      Priority: Medium     Paroxysmal atrial fibrillation (H)      Priority: Medium     Ischemic cardiomyopathy      Priority: Medium     Aortic root dilatation (H)      Priority: Medium     Venous thrombosis      Priority: Medium     IVC filter and lysis procedures 2007       Calculi, ureter 08/14/2018     Priority: Medium     Malabsorption of iron 06/08/2018     Priority: Medium     Benign essential hypertension 04/27/2018     Priority: Medium     Subdural hematoma (H) 04/07/2018     Priority: Medium     Thrombocytopenia -- suspect related to alcohol use 11/29/2017     Priority: Medium     Chronic congestive heart failure, unspecified congestive heart failure type 06/30/2017     Priority: Medium     Prostate cancer -- S/P Radiative Seed implants in 2000 (no problems since) 05/24/2017     Priority: Medium     Antiphospholipid antibody syndrome (H) 05/24/2017     Priority: Medium     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 (H) 05/24/2017     Priority: Medium     Alcohol abuse 09/24/2014     Priority: Medium     Problem list name updated by automated process. Provider to review       Alcoholic cirrhosis of liver (H) 09/24/2014     Priority: Medium     Chronic kidney disease, stage III (moderate) (H) 09/24/2014     Priority: Medium     Physical  deconditioning 09/24/2014     Priority: Medium      Past Medical History:   Diagnosis Date     Alcoholism (H)      Amputated left leg (H)      Anemia      Anticardiolipin antibody syndrome (H)      Antiphospholipid antibody syndrome (H)      Antiplatelet or antithrombotic long-term use      Aortic root dilatation (H)      Aortic stenosis      Arthritis      Balance problem      Coronary artery disease     CABG 2007: SVG to LAD, SVG to diagonal, SVG to OM; cardiac cath 5/17/18: thrombectomy for restenosis of stents-sent for urgent CABG, cath 5/14/2007: GRECIA x2 to LAD, Grecia to diagonal     Gastro-oesophageal reflux disease      Heart attack (H) 2007    apparently arrested, cardiac X5     Hiatal hernia      Hypercholesterolemia      Hypertension      Ischemic cardiomyopathy      Left foot drop      Liver disease     alcoholic liver disease, alcoholic cirrohsosis, portal hypertension     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)     ?When diagnosed, patient not aware of this     Moderate mitral regurgitation      Monoclonal gammopathy      Neuropathy      Noninfectious ileitis     diverticulitis of colon     Nonrheumatic tricuspid valve regurgitation      Paroxysmal atrial fibrillation (H)      PE (pulmonary embolism) 2006    saddle emboli 2006     Prostate cancer (H) 2000    Treated with radiation (seed implants in 2000) -- no problems since     Pulmonary hypertension (H)      Renal disease     kidney stones     Syncope      Thrombocytopenia (H)      Urethral stricture      Venous thrombosis     IVC filter and lysis procedures 2007     Past Surgical History:   Procedure Laterality Date     AMPUTATE LEG BELOW KNEE Left 04/2014    related to gangrene, started as foot ulcer related to neuropathy     AMPUTATE LEG BELOW KNEE Left 12/4/2017    Procedure: AMPUTATE LEG BELOW KNEE;  Exploration of Left Leg Below Knee Amputation Stump with Ligation of Bleeders.;  Surgeon: Leandro Arreola MD;  Location:  OR      APPENDECTOMY       APPLY WOUND VAC Left 12/6/2017    Procedure: APPLY WOUND VAC;;  Surgeon: Saima Howard MD;  Location:  SD     ARTHROPLASTY KNEE Right 9/19/2014    Procedure: ARTHROPLASTY KNEE;  Surgeon: Saima Howard MD;  Location:  OR     CARDIAC SURGERY      CABG     CHOLECYSTECTOMY       COLONOSCOPY       COMBINED CYSTOSCOPY, RETROGRADES, EXCHANGE STENT URETER(S) Left 7/24/2018    Procedure: COMBINED CYSTOSCOPY, RETROGRADES, EXCHANGE STENT URETER(S);  CYSTOSCOPY, BILATERAL RETROGRADES, BILATERAL URETERAL STENT EXCHANGE ;  Surgeon: Matt Cervantes MD;  Location:  OR     CRANIOTOMY Right 4/7/2018    Procedure: CRANIOTOMY;  Right Craniotomy For Subdural Hematoma;  Surgeon: Jnoo Issa MD;  Location:  OR     CYSTOSCOPY, DILATE URETHRA, COMBINED N/A 10/12/2016    Procedure: COMBINED CYSTOSCOPY, DILATE URETHRA;  Surgeon: Dimitry Bello MD;  Location:  OR     CYSTOSCOPY, REMOVE STENT(S), COMBINED Right 7/24/2018    Procedure: COMBINED CYSTOSCOPY, REMOVE STENT(S);;  Surgeon: Jose Hunter MD;  Location:  OR     ENDOSCOPIC STRIPPING VEIN(S)       esophagogastroduodenoscopy       EYE SURGERY      cataracts     GENITOURINARY SURGERY      Prostate Seen Implants     HERNIA REPAIR      Umbilical     INCISION AND DRAINAGE LOWER EXTREMITY, COMBINED Left 12/1/2017    Procedure: COMBINED INCISION AND DRAINAGE LOWER EXTREMITY;  INCISION AND DRAINAGE OF LEFT BELOW KNEE AMPUTATION ABSCESS, WOUND VAC PLACEMENT;  Surgeon: Saima Howard MD;  Location:  OR     IR IVC FILTER PLACEMENT       IRRIGATION AND DEBRIDEMENT LOWER EXTREMITY, COMBINED Left 12/6/2017    Procedure: COMBINED IRRIGATION AND DEBRIDEMENT LOWER EXTREMITY;  IRRIGATION AND DEBRIDEMENT OF LEFT BELOW KNEE AMPUTATION SITE WITH WOUND VAC REPLACEMENT;  Surgeon: Saima Howard MD;  Location: [facility]     IRRIGATION AND DEBRIDEMENT LOWER EXTREMITY, COMBINED Left 12/8/2017    Procedure: COMBINED IRRIGATION AND  "DEBRIDEMENT LOWER EXTREMITY;  IRRIGATION AND DEBRIDEMENT OF LEFT BELOW THE KNEE AMPUTATION AND SHORTENING OF THE TIBIA WITH WOUND CLOSURE;  Surgeon: Saima Howard MD;  Location:  OR     LASER HOLMIUM LITHOTRIPSY URETER(S), INSERT STENT, COMBINED Bilateral 6/28/2018    Procedure: COMBINED CYSTOSCOPY, URETEROSCOPY, LASER HOLMIUM LITHOTRIPSY URETER(S), INSERT STENT;  CYSTOSCOPY, BILATERAL URETEROSCOPY,  HOLMIUM LASER LITHOTRIPSY, BILATERAL STENT PLACEMENT, STONE BASKETING;  Surgeon: Jose Hunter MD;  Location:  OR     LASER HOLMIUM LITHOTRIPSY URETER(S), INSERT STENT, COMBINED Left 8/14/2018    Procedure: COMBINED CYSTOSCOPY, URETEROSCOPY, LASER HOLMIUM LITHOTRIPSY URETER(S), INSERT STENT;  CYSTOSCOPY, LEFT URETEROSCOPY, LEFT LASER HOLMIUM LITHOTRIPSY URETER(S) REMOVAL BILATERAL STENTS;  Surgeon: Jose Hunter MD;  Location:  OR     ORTHOPEDIC SURGERY      (R) knee scope     ORTHOPEDIC SURGERY      total hip      ORTHOPEDIC SURGERY      elbow surgery     Current Outpatient Prescriptions   Medication Sig Dispense Refill     aspirin 81 MG tablet Take 81 mg by mouth daily       carvedilol (COREG) 3.125 MG tablet Take 1 tablet (3.125 mg) by mouth 2 times daily (with meals) 180 tablet 1     fluticasone (FLONASE) 50 MCG/ACT spray Spray 1 spray into both nostrils daily       gabapentin (NEURONTIN) 600 MG tablet Take 1 tablet (600 mg) by mouth 3 times daily (Patient taking differently: Take 600 mg by mouth 2 times daily ) 270 tablet 3     HYDROmorphone HCl (DILAUDID PO) Take 2 mg by mouth every 6 hours as needed for moderate to severe pain       Rivaroxaban (XARELTO PO) Take 20 mg by mouth daily       torsemide (DEMADEX) 20 MG tablet Take 20 mg by mouth daily       OTC products: None, except as noted above    Allergies   Allergen Reactions     Ciprofloxacin Itching     Severe itching \"like ants were crawling\"     Hmg-Coa-R Inhibitors Other (See Comments)     Rhabdomyolysis occurred within a couple days " "     Latex Allergy: NO    Social History   Substance Use Topics     Smoking status: Never Smoker     Smokeless tobacco: Never Used     Alcohol use Yes      Comment: quit 10/2015; now 3-4 Vodkas/night     History   Drug Use No       REVIEW OF SYSTEMS:   Constitutional, neuro, ENT, endocrine, pulmonary, cardiac, gastrointestinal, genitourinary, musculoskeletal, integument (his skin ulcers have healed completely) and psychiatric systems are negative, except as otherwise noted.    EXAM:   /67 (BP Location: Right arm, Cuff Size: Adult Large)  Pulse 58  Temp 98.5  F (36.9  C) (Oral)  Ht 6' 2\" (1.88 m)  Wt 230 lb 11.2 oz (104.6 kg)  SpO2 92%  BMI 29.62 kg/m2    GENERAL APPEARANCE: healthy, alert and no distress, improved     EYES: EOMI,  PERRL     HENT: ear canals and TM's normal and nose and mouth without ulcers or lesions     NECK: no adenopathy, no asymmetry, masses, or scars and thyroid normal to palpation     RESP: lungs clear to auscultation - no rales, rhonchi or wheezes     CV: The heart has an irregularly irregular rhythm with a normal rate. Systolic murmur unchanged from previous exams.     ABDOMEN:  soft, nontender, no HSM or masses and bowel sounds normal     MS: Left leg ampugation, right leg without edema, significant improvement from previous exams     SKIN: skin ulcers seem to be completely healed      NEURO: Alert and oriented to person, place and time. Cranial nerves 2-12 appear grossly intact.   Walks with cane.  Considerable amount of grimacing and pain is observable with walking with the cane     PSYCH: mentation appears normal. and affect normal/bright.  Insight and judgement remain moderate to severely impaired (continues to use alcohol despite multiple physicians advising against any alcohol consumption)     LYMPHATICS: No cervical adenopathy    DIAGNOSTICS:   EKG: on 10/31 showed atrial fibrillation rate 50 with non specific ST changes     Recent Labs   Lab Test  10/31/18   1742  " 10/31/18   1652  10/30/18   1324  08/22/18   1527   05/24/18   0700   05/18/18   1641   HGB   --   12.3*   --   9.9*   < >  9.0*   < >  10.6*   PLT   --   77*  82*  121*   < >  68*   < >  168   INR   --    --   1.46*   --    --   1.68*   --    --    NA  140  Canceled, Test credited, specimen discarded   --   139   < >  138   < >  140   POTASSIUM  4.1  Canceled, Test credited, specimen discarded   --   4.0   < >  4.0   < >  4.3   CR  0.92  Canceled, Test credited, specimen discarded   --   1.62*   < >  1.57*   < >  1.00   A1C   --    --    --    --    --    --    --   4.6    < > = values in this interval not displayed.        IMPRESSION:   Reason for surgery/procedure: Severe left hip osteoarthritis limiting quality of life   Diagnosis/reason for consult: preoperative evaluation     The proposed surgical procedure is considered INTERMEDIATE risk.    REVISED CARDIAC RISK INDEX  The patient has the following serious cardiovascular risks for perioperative complications such as (MI, PE, VFib and 3  AV Block):  Coronary Artery Disease (MI, positive stress test, angina, Qs on EKG)  INTERPRETATION: 2 risks: Class III (moderate risk - 6.6% complication rate)    The patient has the following additional risks for perioperative complications:  Alcohol abuse with risk of withdrawal  Significant bleeding and clotting history      ICD-10-CM    1. Preop general physical exam Z01.818 Comprehensive metabolic panel     CBC with platelets   2. Primary osteoarthritis of right hip M16.11    3. Alcoholic cirrhosis of liver with ascites (H) K70.31    4. Alcohol abuse F10.10    5. Antiphospholipid antibody syndrome (H) D68.61    6. Anemia, unspecified type D64.9 JUST IN CASE   7. Chronic kidney disease, stage III (moderate) (H) N18.3    8. Thrombocytopenia -- suspect related to alcohol use D69.6    9. Coronary artery disease involving native coronary artery of native heart without angina pectoris I25.10    10. Paroxysmal atrial fibrillation  (H) I48.0    11. Ischemic cardiomyopathy I25.5    12. Aortic root dilatation (H) I77.810    13. 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 (H) C83.39    14. Benign essential hypertension I10      At this point, I think he can proceeds with surgery  He and his wife were counseled on the importance of absolute abstinence from alcohol between now and surgery  Also, he needs to improve on nutrition between now and surgery, I recommend 2 cans of BOOST/ENSURE per day until surgery  He and his wife pledged that they would make sure he does so  See below for Dr. Mcintyre's recommendation re perioperative management of Xarelto  Anemia is stable and probably at the best level possible in his case going into surgery   His heart disease is thought to be stable per recent cardiology visit  Blood pressure is well controlled going into surgery  Recent Cr was normal, recheck today     We discussed that because of his multiple comorbid conditions the surgery is high risk.  He and his wife seemed to understand this.  However, they both feel that the risk is worthwhile given how poor his quality of life is living with his severe hip arthritis.   We discussed the abstinence from alcohol consumption and good nutrition between now and surgery are essential to insuring a good outcome from surgery.   I think he understood this.      RECOMMENDATIONS:     --Consult hospital rounder / IM to assist post-op medical management    Cardiovascular Risk  Performs 4 METs exercise without symptoms (Climb a flight of stairs) .       Anemia  Anemia and does not require treatment prior to surgery.  Monitor Hemoglobin postoperatively.      --Patient is to take all scheduled medications on the day of surgery EXCEPT for modifications listed below.    Anticoagulant or Antiplatelet Medication Use  XARELTO: Bleeding risk is at least moderate for this procedure and rivaroxaban (Xarelto) should be stopped 3 days prior to procedure according to   Francisco Javier.  Due to history of venous thrombosis, the Xarelto should be restarted as soon as appropriate following surgery.          APPROVAL GIVEN to proceed with proposed procedure, without further diagnostic evaluation       Signed Electronically by: Main Hardy MD    Copy of this evaluation report is provided to requesting physician.    [facility] Preop Guidelines  Revised Cardiac Risk Index

## 2018-11-08 NOTE — LETTER
[facility]  [address] [facility]  [address]  [place]  [address]  Tel: [phone]    November 12, 2018    Antonio KINGS Ramirez  [address]   [facility] [address]        Dear Jessica Ashley,    The following letter pertains to your most recent diagnostic tests:    Good news! Labs looks stable and optimized for proceeding with hip replacement.    Sincerely,    Main Hardy MD/RENETTA          Enclosure: Lab Results  Results for orders placed or performed in visit on 11/08/18   Comprehensive metabolic panel   Result Value Ref Range    Sodium 139 133 - 144 mmol/L    Potassium 4.6 3.4 - 5.3 mmol/L    Chloride 103 94 - 109 mmol/L    Carbon Dioxide 27 20 - 32 mmol/L    Anion Gap 9 3 - 14 mmol/L    Glucose 96 70 - 99 mg/dL    Urea Nitrogen 15 7 - 30 mg/dL    Creatinine 1.20 0.66 - 1.25 mg/dL    GFR Estimate 59 (L) >60 mL/min/1.7m2    GFR Estimate If Black 71 >60 mL/min/1.7m2    Calcium 8.7 8.5 - 10.1 mg/dL    Bilirubin Total 0.9 0.2 - 1.3 mg/dL    Albumin 3.0 (L) 3.4 - 5.0 g/dL    Protein Total 7.0 6.8 - 8.8 g/dL    Alkaline Phosphatase 101 40 - 150 U/L    ALT 17 0 - 70 U/L    AST 34 0 - 45 U/L   CBC with platelets   Result Value Ref Range    WBC 3.0 (L) 4.0 - 11.0 10e9/L    RBC Count 3.55 (L) 4.4 - 5.9 10e12/L    Hemoglobin 11.5 (L) 13.3 - 17.7 g/dL    Hematocrit 35.2 (L) 40.0 - 53.0 %    MCV 99 78 - 100 fl    MCH 32.4 26.5 - 33.0 pg    MCHC 32.7 31.5 - 36.5 g/dL    RDW 14.8 10.0 - 15.0 %    Platelet Count 102 (L) 150 - 450 10e9/L

## 2018-11-08 NOTE — MR AVS SNAPSHOT
After Visit Summary   11/8/2018    Antonio Ramirez    MRN: [id number]           Patient Information     Date Of Birth          1945        Visit Information        Provider Department      11/8/2018 4:00 PM Main Hardy MD [facility]        Today's Diagnoses     Preop general physical exam    -  1    Primary osteoarthritis of right hip        Alcoholic cirrhosis of liver with ascites (H)        Alcohol abuse        Antiphospholipid antibody syndrome (H)        Anemia, unspecified type        Chronic kidney disease, stage III (moderate) (H)        Thrombocytopenia -- suspect related to alcohol use        Coronary artery disease involving native coronary artery of native heart without angina pectoris        Paroxysmal atrial fibrillation (H)        Ischemic cardiomyopathy        Aortic root dilatation (H)        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 (H)        Benign essential hypertension          Care Instructions      Before Your Surgery      Call your surgeon if there is any change in your health. This includes signs of a cold or flu (such as a sore throat, runny nose, cough, rash or fever).    Do not smoke, drink alcohol or take over the counter medicine (unless your surgeon or primary care doctor tells you to) for the 24 hours before and after surgery.    If you take prescribed drugs: Follow your doctor s orders about which medicines to take and which to stop until after surgery.    Eating and drinking prior to surgery: follow the instructions from your surgeon    Take a shower or bath the night before surgery. Use the soap your surgeon gave you to gently clean your skin. If you do not have soap from your surgeon, use your regular soap. Do not shave or scrub the surgery site.  Wear clean pajamas and have clean sheets on your bed.           Follow-ups after your visit        Your next 10 appointments already scheduled     Nov 27, 2018   Procedure with Saima STEWARD  "MD Lee   [facility] (--)    [address], [address]  [place] [address] [phone]              Who to contact     If you have questions or need follow up information about today's clinic visit or your schedule please contact [facility] directly at [phone].  Normal or non-critical lab and imaging results will be communicated to you by MyChart, letter or phone within 4 business days after the clinic has received the results. If you do not hear from us within 7 days, please contact the clinic through Reds10[facility] or phone. If you have a critical or abnormal lab result, we will notify you by phone as soon as possible.  Submit refill requests through [employer]iar or call your pharmacy and they will forward the refill request to us. Please allow 3 business days for your refill to be completed.          Additional Information About Your Visit        Reds10harPrimordial Information     C[employer]iar gives you secure access to your electronic health record. If you see a primary care provider, you can also send messages to your care team and make appointments. If you have questions, please call your primary care clinic.  If you do not have a primary care provider, please call [phone] and they will assist you.        Care EveryWhere ID     This is your Care EveryWhere ID. This could be used by other organizations to access your [facility] medical records  [id number]        Your Vitals Were     Pulse Temperature Height Pulse Oximetry BMI (Body Mass Index)       58 98.5  F (36.9  C) (Oral) 6' 2\" (1.88 m) 92% 29.62 kg/m2        Blood Pressure from Last 3 Encounters:   11/08/18 128/67   10/31/18 145/81   10/30/18 131/80    Weight from Last 3 Encounters:   11/08/18 230 lb 11.2 oz (104.6 kg)   10/02/18 234 lb (106.1 kg)   09/24/18 230 lb (104.3 kg)              We Performed the Following     CBC with platelets     Comprehensive metabolic panel     JUST IN CASE          Today's Medication Changes    "       These changes are accurate as of 11/8/18  7:39 PM.  If you have any questions, ask your nurse or doctor.               These medicines have changed or have updated prescriptions.        Dose/Directions    gabapentin 600 MG tablet   Commonly known as:  NEURONTIN   This may have changed:  when to take this   Used for:  Phantom limb pain (H)        Dose:  600 mg   Take 1 tablet (600 mg) by mouth 3 times daily   Quantity:  270 tablet   Refills:  3                Primary Care Provider Office Phone # Fax #    Main Hardy [phone] [address] [address]        Equal Access to Services     [facility]: Hadii peyton munguia hadashlove Sosylvia, waaxda luqmandy, qaybta kaalmadenise galloway, igor miller . So [facility] [phone].    ATENCIÓN: Si habla español, tiene a shrestha disposición servicios gratuitos de asistencia lingüística. [facility] [phone].    We comply with applicable federal civil rights laws and [place] laws. We do not discriminate on the basis of race, color, national origin, age, disability, sex, sexual orientation, or gender identity.            Thank you!     Thank you for choosing [facility]  for your care. Our goal is always to provide you with excellent care. Hearing back from our patients is one way we can continue to improve our services. Please take a few minutes to complete the written survey that you may receive in the mail after your visit with us. Thank you!             Your Updated Medication List - Protect others around you: Learn how to safely use, store and throw away your medicines at www.disposemymeds.org.          This list is accurate as of 11/8/18  7:39 PM.  Always use your most recent med list.                   Brand Name Dispense Instructions for use Diagnosis    aspirin 81 MG tablet      Take 81 mg by mouth daily        carvedilol 3.125 MG tablet    COREG    180 tablet    Take 1 tablet (3.125 mg) by  mouth 2 times daily (with meals)        DILAUDID PO      Take 2 mg by mouth every 6 hours as needed for moderate to severe pain        fluticasone 50 MCG/ACT spray    FLONASE     Spray 1 spray into both nostrils daily        gabapentin 600 MG tablet    NEURONTIN    270 tablet    Take 1 tablet (600 mg) by mouth 3 times daily    Phantom limb pain (H)       torsemide 20 MG tablet    DEMADEX     Take 20 mg by mouth daily        XARELTO PO      Take 20 mg by mouth daily

## 2018-11-09 LAB
ALBUMIN SERPL-MCNC: 3 G/DL (ref 3.4–5)
ALP SERPL-CCNC: 101 U/L (ref 40–150)
ALT SERPL W P-5'-P-CCNC: 17 U/L (ref 0–70)
ANION GAP SERPL CALCULATED.3IONS-SCNC: 9 MMOL/L (ref 3–14)
AST SERPL W P-5'-P-CCNC: 34 U/L (ref 0–45)
BILIRUB SERPL-MCNC: 0.9 MG/DL (ref 0.2–1.3)
BUN SERPL-MCNC: 15 MG/DL (ref 7–30)
CALCIUM SERPL-MCNC: 8.7 MG/DL (ref 8.5–10.1)
CHLORIDE SERPL-SCNC: 103 MMOL/L (ref 94–109)
CO2 SERPL-SCNC: 27 MMOL/L (ref 20–32)
CREAT SERPL-MCNC: 1.2 MG/DL (ref 0.66–1.25)
GFR SERPL CREATININE-BSD FRML MDRD: 59 ML/MIN/1.7M2
GLUCOSE SERPL-MCNC: 96 MG/DL (ref 70–99)
POTASSIUM SERPL-SCNC: 4.6 MMOL/L (ref 3.4–5.3)
PROT SERPL-MCNC: 7 G/DL (ref 6.8–8.8)
SODIUM SERPL-SCNC: 139 MMOL/L (ref 133–144)

## 2018-11-11 NOTE — PROGRESS NOTES
The following letter pertains to your most recent diagnostic tests:    Good news! Labs looks stable and optimized for proceeding with hip replacement.      Sincerely,    Dr. Hardy

## 2018-11-26 NOTE — H&P (VIEW-ONLY)
[facility]  [address] [facility] [address]  [phone]  Dept: [phone]    PRE-OP EVALUATION:  Today's date: 2018    Antonio Ramirez (: 1945) presents for pre-operative evaluation assessment as requested by Saima Crockett MD.  He requires evaluation and anesthesia risk assessment prior to undergoing surgery/procedure for treatment of left hip osteoarthritis  .    Proposed Surgery/ Procedure: RIGHT TOTAL HIP ARTHROPLASTY (DEPUY)^  Date of Surgery/ Procedure: 2018  Time of Surgery/ Procedure: 7:30am  Hospital/Surgical Facility:  OR    Primary Physician: Main Hardy  Type of Anesthesia Anticipated: General    Patient has a Health Care Directive or Living Will:  NO    1. Yes - Do you have a history of heart attack, stroke, stent, bypass or surgery on an artery in the head, neck, heart or legs?  2. NO - Do you ever have any pain or discomfort in your chest?  3. NO - Do you have a history of  Heart Failure?  4. NO - Are you troubled by shortness of breath when: walking on the level, up a slight hill or at night?  5. NO - Do you currently have a cold, bronchitis or other respiratory infection?  6. NO - Do you have a cough, shortness of breath or wheezing?  7. NO - Do you sometimes get pains in the calves of your legs when you walk?  8. yes - Do you or anyone in your family have previous history of blood clots?  9. NO - Do you or does anyone in your family have a serious bleeding problem such as prolonged bleeding following surgeries or cuts?  10. yes - Have you ever had problems with anemia or been told to take iron pills?  11. NO - Have you had any abnormal blood loss such as black, tarry or bloody stools, or abnormal vaginal bleeding?  12. yes - Have you ever had a blood transfusion?  13. NO - Have you or any of your relatives ever had problems with anesthesia?  14. NO - Do you have sleep apnea, excessive snoring or daytime drowsiness?  15. NO - Do you  have any prosthetic heart valves?  16. yes - Do you have prosthetic joints?  17. NO - Is there any chance that you may be pregnant?      HPI:     HPI related to upcoming procedure: Sage is a 73-year-old retired  with multiple medical problems including cirrhosis with ascites from alcohol with ongoing alcohol use, status post right leg amputation, antiphospholipid antibody syndrome, history of recent subdural hematoma, history of recent hospitalizations for sepsis related to obstructing kidney stones, coronary artery disease status post coronary artery bypass grafting, ischemic cardiomyopathy, pulmonary hypertension, moderate aortic stenosis, mild aortic root dilation, moderate mitral insufficiency, tricuspid regurgitation, chronic atrial fibrillation, history of prostate cancer, history of anemia recently evaluated by his hematologist and thought to be related to chronic kidney disease, history of lymphoma.  He has been complaining of severe, quality of life limiting left hip pain for several years, but it has become unbearable over the last several months.  He is desperate to have his left hip replaced.  Due to various decompensation in his health status, his surgery date has had to be postponed.  This included hospitalizations for sepsis, anemia, concerns about non healing skin ulcers on left leg and lymphedema.  He presents for a preoperative evaluation hoping to have surgery on 11/27/18.  He was seen in the ER on 10/31 with lethargy and hypoglycemia.  His wife states that he was not eating well for several days prior to that visit.  It was also noted that he was drinking 5-7 vodka drinks per night by the ER physician, but the patient denies this and his wife corroborates that claim.  He states he is drinking 1-2 vodka drinks several days per week.  His wife has also encouraged better food intake since ER discharge.   He did recently establish with a cardiologist at [facility] heart and his cardiac  conditions were thought to be stable at the time.       MEDICAL HISTORY:     Patient Active Problem List    Diagnosis Date Noted     Anemia      Priority: Medium     Coronary artery disease involving native coronary artery of native heart without angina pectoris      Priority: Medium     CABG 2007: SVG to LAD, SVG to diagonal, SVG to OM; cardiac cath 5/17/18: thrombectomy for restenosis of stents-sent for urgent CABG, cath 5/14/2007: GRECIA x2 to LAD, Grecia to diagonal       Aortic stenosis      Priority: Medium     Nonrheumatic mitral valve regurgitation      Priority: Medium     Nonrheumatic tricuspid valve regurgitation      Priority: Medium     Pulmonary hypertension (H)      Priority: Medium     Paroxysmal atrial fibrillation (H)      Priority: Medium     Ischemic cardiomyopathy      Priority: Medium     Aortic root dilatation (H)      Priority: Medium     Venous thrombosis      Priority: Medium     IVC filter and lysis procedures 2007       Calculi, ureter 08/14/2018     Priority: Medium     Malabsorption of iron 06/08/2018     Priority: Medium     Benign essential hypertension 04/27/2018     Priority: Medium     Subdural hematoma (H) 04/07/2018     Priority: Medium     Thrombocytopenia -- suspect related to alcohol use 11/29/2017     Priority: Medium     Chronic congestive heart failure, unspecified congestive heart failure type 06/30/2017     Priority: Medium     Prostate cancer -- S/P Radiative Seed implants in 2000 (no problems since) 05/24/2017     Priority: Medium     Antiphospholipid antibody syndrome (H) 05/24/2017     Priority: Medium     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 (H) 05/24/2017     Priority: Medium     Alcohol abuse 09/24/2014     Priority: Medium     Problem list name updated by automated process. Provider to review       Alcoholic cirrhosis of liver (H) 09/24/2014     Priority: Medium     Chronic kidney disease, stage III (moderate) (H) 09/24/2014     Priority: Medium     Physical  deconditioning 09/24/2014     Priority: Medium      Past Medical History:   Diagnosis Date     Alcoholism (H)      Amputated left leg (H)      Anemia      Anticardiolipin antibody syndrome (H)      Antiphospholipid antibody syndrome (H)      Antiplatelet or antithrombotic long-term use      Aortic root dilatation (H)      Aortic stenosis      Arthritis      Balance problem      Coronary artery disease     CABG 2007: SVG to LAD, SVG to diagonal, SVG to OM; cardiac cath 5/17/18: thrombectomy for restenosis of stents-sent for urgent CABG, cath 5/14/2007: GRECIA x2 to LAD, Grecia to diagonal     Gastro-oesophageal reflux disease      Heart attack (H) 2007    apparently arrested, cardiac X5     Hiatal hernia      Hypercholesterolemia      Hypertension      Ischemic cardiomyopathy      Left foot drop      Liver disease     alcoholic liver disease, alcoholic cirrohsosis, portal hypertension     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)     ?When diagnosed, patient not aware of this     Moderate mitral regurgitation      Monoclonal gammopathy      Neuropathy      Noninfectious ileitis     diverticulitis of colon     Nonrheumatic tricuspid valve regurgitation      Paroxysmal atrial fibrillation (H)      PE (pulmonary embolism) 2006    saddle emboli 2006     Prostate cancer (H) 2000    Treated with radiation (seed implants in 2000) -- no problems since     Pulmonary hypertension (H)      Renal disease     kidney stones     Syncope      Thrombocytopenia (H)      Urethral stricture      Venous thrombosis     IVC filter and lysis procedures 2007     Past Surgical History:   Procedure Laterality Date     AMPUTATE LEG BELOW KNEE Left 04/2014    related to gangrene, started as foot ulcer related to neuropathy     AMPUTATE LEG BELOW KNEE Left 12/4/2017    Procedure: AMPUTATE LEG BELOW KNEE;  Exploration of Left Leg Below Knee Amputation Stump with Ligation of Bleeders.;  Surgeon: Leandro Arreola MD;  Location:  OR      APPENDECTOMY       APPLY WOUND VAC Left 12/6/2017    Procedure: APPLY WOUND VAC;;  Surgeon: Saima Howard MD;  Location:  SD     ARTHROPLASTY KNEE Right 9/19/2014    Procedure: ARTHROPLASTY KNEE;  Surgeon: Saima Howard MD;  Location:  OR     CARDIAC SURGERY      CABG     CHOLECYSTECTOMY       COLONOSCOPY       COMBINED CYSTOSCOPY, RETROGRADES, EXCHANGE STENT URETER(S) Left 7/24/2018    Procedure: COMBINED CYSTOSCOPY, RETROGRADES, EXCHANGE STENT URETER(S);  CYSTOSCOPY, BILATERAL RETROGRADES, BILATERAL URETERAL STENT EXCHANGE ;  Surgeon: Matt Cervantes MD;  Location:  OR     CRANIOTOMY Right 4/7/2018    Procedure: CRANIOTOMY;  Right Craniotomy For Subdural Hematoma;  Surgeon: Jono Issa MD;  Location:  OR     CYSTOSCOPY, DILATE URETHRA, COMBINED N/A 10/12/2016    Procedure: COMBINED CYSTOSCOPY, DILATE URETHRA;  Surgeon: Dimitry Bello MD;  Location:  OR     CYSTOSCOPY, REMOVE STENT(S), COMBINED Right 7/24/2018    Procedure: COMBINED CYSTOSCOPY, REMOVE STENT(S);;  Surgeon: Jose Hunter MD;  Location:  OR     ENDOSCOPIC STRIPPING VEIN(S)       esophagogastroduodenoscopy       EYE SURGERY      cataracts     GENITOURINARY SURGERY      Prostate Seen Implants     HERNIA REPAIR      Umbilical     INCISION AND DRAINAGE LOWER EXTREMITY, COMBINED Left 12/1/2017    Procedure: COMBINED INCISION AND DRAINAGE LOWER EXTREMITY;  INCISION AND DRAINAGE OF LEFT BELOW KNEE AMPUTATION ABSCESS, WOUND VAC PLACEMENT;  Surgeon: Saima Howard MD;  Location:  OR     IR IVC FILTER PLACEMENT       IRRIGATION AND DEBRIDEMENT LOWER EXTREMITY, COMBINED Left 12/6/2017    Procedure: COMBINED IRRIGATION AND DEBRIDEMENT LOWER EXTREMITY;  IRRIGATION AND DEBRIDEMENT OF LEFT BELOW KNEE AMPUTATION SITE WITH WOUND VAC REPLACEMENT;  Surgeon: Saima Howard MD;  Location: [facility]     IRRIGATION AND DEBRIDEMENT LOWER EXTREMITY, COMBINED Left 12/8/2017    Procedure: COMBINED IRRIGATION AND  "DEBRIDEMENT LOWER EXTREMITY;  IRRIGATION AND DEBRIDEMENT OF LEFT BELOW THE KNEE AMPUTATION AND SHORTENING OF THE TIBIA WITH WOUND CLOSURE;  Surgeon: Saima Howard MD;  Location:  OR     LASER HOLMIUM LITHOTRIPSY URETER(S), INSERT STENT, COMBINED Bilateral 6/28/2018    Procedure: COMBINED CYSTOSCOPY, URETEROSCOPY, LASER HOLMIUM LITHOTRIPSY URETER(S), INSERT STENT;  CYSTOSCOPY, BILATERAL URETEROSCOPY,  HOLMIUM LASER LITHOTRIPSY, BILATERAL STENT PLACEMENT, STONE BASKETING;  Surgeon: Jose Hunter MD;  Location:  OR     LASER HOLMIUM LITHOTRIPSY URETER(S), INSERT STENT, COMBINED Left 8/14/2018    Procedure: COMBINED CYSTOSCOPY, URETEROSCOPY, LASER HOLMIUM LITHOTRIPSY URETER(S), INSERT STENT;  CYSTOSCOPY, LEFT URETEROSCOPY, LEFT LASER HOLMIUM LITHOTRIPSY URETER(S) REMOVAL BILATERAL STENTS;  Surgeon: Jose Hunter MD;  Location:  OR     ORTHOPEDIC SURGERY      (R) knee scope     ORTHOPEDIC SURGERY      total hip      ORTHOPEDIC SURGERY      elbow surgery     Current Outpatient Prescriptions   Medication Sig Dispense Refill     aspirin 81 MG tablet Take 81 mg by mouth daily       carvedilol (COREG) 3.125 MG tablet Take 1 tablet (3.125 mg) by mouth 2 times daily (with meals) 180 tablet 1     fluticasone (FLONASE) 50 MCG/ACT spray Spray 1 spray into both nostrils daily       gabapentin (NEURONTIN) 600 MG tablet Take 1 tablet (600 mg) by mouth 3 times daily (Patient taking differently: Take 600 mg by mouth 2 times daily ) 270 tablet 3     HYDROmorphone HCl (DILAUDID PO) Take 2 mg by mouth every 6 hours as needed for moderate to severe pain       Rivaroxaban (XARELTO PO) Take 20 mg by mouth daily       torsemide (DEMADEX) 20 MG tablet Take 20 mg by mouth daily       OTC products: None, except as noted above    Allergies   Allergen Reactions     Ciprofloxacin Itching     Severe itching \"like ants were crawling\"     Hmg-Coa-R Inhibitors Other (See Comments)     Rhabdomyolysis occurred within a couple days " "     Latex Allergy: NO    Social History   Substance Use Topics     Smoking status: Never Smoker     Smokeless tobacco: Never Used     Alcohol use Yes      Comment: quit 10/2015; now 3-4 Vodkas/night     History   Drug Use No       REVIEW OF SYSTEMS:   Constitutional, neuro, ENT, endocrine, pulmonary, cardiac, gastrointestinal, genitourinary, musculoskeletal, integument (his skin ulcers have healed completely) and psychiatric systems are negative, except as otherwise noted.    EXAM:   /67 (BP Location: Right arm, Cuff Size: Adult Large)  Pulse 58  Temp 98.5  F (36.9  C) (Oral)  Ht 6' 2\" (1.88 m)  Wt 230 lb 11.2 oz (104.6 kg)  SpO2 92%  BMI 29.62 kg/m2    GENERAL APPEARANCE: healthy, alert and no distress, improved     EYES: EOMI,  PERRL     HENT: ear canals and TM's normal and nose and mouth without ulcers or lesions     NECK: no adenopathy, no asymmetry, masses, or scars and thyroid normal to palpation     RESP: lungs clear to auscultation - no rales, rhonchi or wheezes     CV: The heart has an irregularly irregular rhythm with a normal rate. Systolic murmur unchanged from previous exams.     ABDOMEN:  soft, nontender, no HSM or masses and bowel sounds normal     MS: Left leg ampugation, right leg without edema, significant improvement from previous exams     SKIN: skin ulcers seem to be completely healed      NEURO: Alert and oriented to person, place and time. Cranial nerves 2-12 appear grossly intact.   Walks with cane.  Considerable amount of grimacing and pain is observable with walking with the cane     PSYCH: mentation appears normal. and affect normal/bright.  Insight and judgement remain moderate to severely impaired (continues to use alcohol despite multiple physicians advising against any alcohol consumption)     LYMPHATICS: No cervical adenopathy    DIAGNOSTICS:   EKG: on 10/31 showed atrial fibrillation rate 50 with non specific ST changes     Recent Labs   Lab Test  10/31/18   1742  " 10/31/18   1652  10/30/18   1324  08/22/18   1527   05/24/18   0700   05/18/18   1641   HGB   --   12.3*   --   9.9*   < >  9.0*   < >  10.6*   PLT   --   77*  82*  121*   < >  68*   < >  168   INR   --    --   1.46*   --    --   1.68*   --    --    NA  140  Canceled, Test credited, specimen discarded   --   139   < >  138   < >  140   POTASSIUM  4.1  Canceled, Test credited, specimen discarded   --   4.0   < >  4.0   < >  4.3   CR  0.92  Canceled, Test credited, specimen discarded   --   1.62*   < >  1.57*   < >  1.00   A1C   --    --    --    --    --    --    --   4.6    < > = values in this interval not displayed.        IMPRESSION:   Reason for surgery/procedure: Severe left hip osteoarthritis limiting quality of life   Diagnosis/reason for consult: preoperative evaluation     The proposed surgical procedure is considered INTERMEDIATE risk.    REVISED CARDIAC RISK INDEX  The patient has the following serious cardiovascular risks for perioperative complications such as (MI, PE, VFib and 3  AV Block):  Coronary Artery Disease (MI, positive stress test, angina, Qs on EKG)  INTERPRETATION: 2 risks: Class III (moderate risk - 6.6% complication rate)    The patient has the following additional risks for perioperative complications:  Alcohol abuse with risk of withdrawal  Significant bleeding and clotting history      ICD-10-CM    1. Preop general physical exam Z01.818 Comprehensive metabolic panel     CBC with platelets   2. Primary osteoarthritis of right hip M16.11    3. Alcoholic cirrhosis of liver with ascites (H) K70.31    4. Alcohol abuse F10.10    5. Antiphospholipid antibody syndrome (H) D68.61    6. Anemia, unspecified type D64.9 JUST IN CASE   7. Chronic kidney disease, stage III (moderate) (H) N18.3    8. Thrombocytopenia -- suspect related to alcohol use D69.6    9. Coronary artery disease involving native coronary artery of native heart without angina pectoris I25.10    10. Paroxysmal atrial fibrillation  (H) I48.0    11. Ischemic cardiomyopathy I25.5    12. Aortic root dilatation (H) I77.810    13. 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 (H) C83.39    14. Benign essential hypertension I10      At this point, I think he can proceeds with surgery  He and his wife were counseled on the importance of absolute abstinence from alcohol between now and surgery  Also, he needs to improve on nutrition between now and surgery, I recommend 2 cans of BOOST/ENSURE per day until surgery  He and his wife pledged that they would make sure he does so  See below for Dr. Mcintyre's recommendation re perioperative management of Xarelto  Anemia is stable and probably at the best level possible in his case going into surgery   His heart disease is thought to be stable per recent cardiology visit  Blood pressure is well controlled going into surgery  Recent Cr was normal, recheck today     We discussed that because of his multiple comorbid conditions the surgery is high risk.  He and his wife seemed to understand this.  However, they both feel that the risk is worthwhile given how poor his quality of life is living with his severe hip arthritis.   We discussed the abstinence from alcohol consumption and good nutrition between now and surgery are essential to insuring a good outcome from surgery.   I think he understood this.      RECOMMENDATIONS:     --Consult hospital rounder / IM to assist post-op medical management    Cardiovascular Risk  Performs 4 METs exercise without symptoms (Climb a flight of stairs) .       Anemia  Anemia and does not require treatment prior to surgery.  Monitor Hemoglobin postoperatively.      --Patient is to take all scheduled medications on the day of surgery EXCEPT for modifications listed below.    Anticoagulant or Antiplatelet Medication Use  XARELTO: Bleeding risk is at least moderate for this procedure and rivaroxaban (Xarelto) should be stopped 3 days prior to procedure according to   Francisco Javier.  Due to history of venous thrombosis, the Xarelto should be restarted as soon as appropriate following surgery.          APPROVAL GIVEN to proceed with proposed procedure, without further diagnostic evaluation       Signed Electronically by: Main Hardy MD    Copy of this evaluation report is provided to requesting physician.    [facility] Preop Guidelines  Revised Cardiac Risk Index

## 2018-11-27 ENCOUNTER — APPOINTMENT (OUTPATIENT)
Dept: GENERAL RADIOLOGY | Facility: CLINIC | Age: 73
DRG: 470 | End: 2018-11-27
Attending: ORTHOPAEDIC SURGERY
Payer: MEDICARE

## 2018-11-27 ENCOUNTER — ANESTHESIA EVENT (OUTPATIENT)
Dept: SURGERY | Facility: CLINIC | Age: 73
DRG: 470 | End: 2018-11-27
Payer: MEDICARE

## 2018-11-27 ENCOUNTER — ANESTHESIA (OUTPATIENT)
Dept: SURGERY | Facility: CLINIC | Age: 73
DRG: 470 | End: 2018-11-27
Payer: MEDICARE

## 2018-11-27 ENCOUNTER — APPOINTMENT (OUTPATIENT)
Dept: PHYSICAL THERAPY | Facility: CLINIC | Age: 73
DRG: 470 | End: 2018-11-27
Attending: ORTHOPAEDIC SURGERY
Payer: MEDICARE

## 2018-11-27 ENCOUNTER — HOSPITAL ENCOUNTER (INPATIENT)
Facility: CLINIC | Age: 73
LOS: 8 days | Discharge: NURSING, REHAB OR LONG-TERM CARE | DRG: 470 | End: 2018-12-05
Attending: ORTHOPAEDIC SURGERY | Admitting: ORTHOPAEDIC SURGERY
Payer: MEDICARE

## 2018-11-27 DIAGNOSIS — I50.22 CHRONIC SYSTOLIC HEART FAILURE (H): ICD-10-CM

## 2018-11-27 DIAGNOSIS — Z96.641 STATUS POST TOTAL REPLACEMENT OF RIGHT HIP: Primary | ICD-10-CM

## 2018-11-27 PROBLEM — Z96.649 S/P TOTAL HIP ARTHROPLASTY: Status: ACTIVE | Noted: 2018-11-27

## 2018-11-27 LAB
ABO + RH BLD: NORMAL
ABO + RH BLD: NORMAL
BLD GP AB SCN SERPL QL: NORMAL
BLOOD BANK CMNT PATIENT-IMP: NORMAL
ERYTHROCYTE [DISTWIDTH] IN BLOOD BY AUTOMATED COUNT: 15.2 % (ref 10–15)
HCT VFR BLD AUTO: 30.6 % (ref 40–53)
HGB BLD-MCNC: 10 G/DL (ref 13.3–17.7)
HGB BLD-MCNC: 10.4 G/DL (ref 13.3–17.7)
MCH RBC QN AUTO: 32.6 PG (ref 26.5–33)
MCHC RBC AUTO-ENTMCNC: 34 G/DL (ref 31.5–36.5)
MCV RBC AUTO: 96 FL (ref 78–100)
PLATELET # BLD AUTO: 88 10E9/L (ref 150–450)
POTASSIUM SERPL-SCNC: 3.7 MMOL/L (ref 3.4–5.3)
RBC # BLD AUTO: 3.19 10E12/L (ref 4.4–5.9)
SPECIMEN EXP DATE BLD: NORMAL
WBC # BLD AUTO: 4.7 10E9/L (ref 4–11)

## 2018-11-27 PROCEDURE — 25800025 ZZH RX 258: Performed by: ORTHOPAEDIC SURGERY

## 2018-11-27 PROCEDURE — 37000009 ZZH ANESTHESIA TECHNICAL FEE, EACH ADDTL 15 MIN: Performed by: ORTHOPAEDIC SURGERY

## 2018-11-27 PROCEDURE — 99222 1ST HOSP IP/OBS MODERATE 55: CPT | Performed by: INTERNAL MEDICINE

## 2018-11-27 PROCEDURE — 25000566 ZZH SEVOFLURANE, EA 15 MIN: Performed by: ORTHOPAEDIC SURGERY

## 2018-11-27 PROCEDURE — 71000012 ZZH RECOVERY PHASE 1 LEVEL 1 FIRST HR: Performed by: ORTHOPAEDIC SURGERY

## 2018-11-27 PROCEDURE — 25000128 H RX IP 250 OP 636: Performed by: ANESTHESIOLOGY

## 2018-11-27 PROCEDURE — 25000128 H RX IP 250 OP 636: Performed by: PHYSICIAN ASSISTANT

## 2018-11-27 PROCEDURE — 36000063 ZZH SURGERY LEVEL 4 EA 15 ADDTL MIN: Performed by: ORTHOPAEDIC SURGERY

## 2018-11-27 PROCEDURE — 12000007 ZZH R&B INTERMEDIATE

## 2018-11-27 PROCEDURE — 84132 ASSAY OF SERUM POTASSIUM: CPT | Performed by: ANESTHESIOLOGY

## 2018-11-27 PROCEDURE — 97110 THERAPEUTIC EXERCISES: CPT | Mod: GP | Performed by: PHYSICAL THERAPIST

## 2018-11-27 PROCEDURE — 86850 RBC ANTIBODY SCREEN: CPT | Performed by: ANESTHESIOLOGY

## 2018-11-27 PROCEDURE — 25000125 ZZHC RX 250: Performed by: NURSE ANESTHETIST, CERTIFIED REGISTERED

## 2018-11-27 PROCEDURE — 36000093 ZZH SURGERY LEVEL 4 1ST 30 MIN: Performed by: ORTHOPAEDIC SURGERY

## 2018-11-27 PROCEDURE — 71000013 ZZH RECOVERY PHASE 1 LEVEL 1 EA ADDTL HR: Performed by: ORTHOPAEDIC SURGERY

## 2018-11-27 PROCEDURE — P9041 ALBUMIN (HUMAN),5%, 50ML: HCPCS | Performed by: ANESTHESIOLOGY

## 2018-11-27 PROCEDURE — 36415 COLL VENOUS BLD VENIPUNCTURE: CPT | Performed by: ANESTHESIOLOGY

## 2018-11-27 PROCEDURE — 25000128 H RX IP 250 OP 636: Performed by: NURSE ANESTHETIST, CERTIFIED REGISTERED

## 2018-11-27 PROCEDURE — 86900 BLOOD TYPING SEROLOGIC ABO: CPT | Performed by: ANESTHESIOLOGY

## 2018-11-27 PROCEDURE — 97530 THERAPEUTIC ACTIVITIES: CPT | Mod: GP | Performed by: PHYSICAL THERAPIST

## 2018-11-27 PROCEDURE — 40000170 ZZH STATISTIC PRE-PROCEDURE ASSESSMENT II: Performed by: ORTHOPAEDIC SURGERY

## 2018-11-27 PROCEDURE — 0SR902A REPLACEMENT OF RIGHT HIP JOINT WITH METAL ON POLYETHYLENE SYNTHETIC SUBSTITUTE, UNCEMENTED, OPEN APPROACH: ICD-10-PCS | Performed by: ORTHOPAEDIC SURGERY

## 2018-11-27 PROCEDURE — C1713 ANCHOR/SCREW BN/BN,TIS/BN: HCPCS | Performed by: ORTHOPAEDIC SURGERY

## 2018-11-27 PROCEDURE — 40000985 XR PELVIS PORT 1/2 VW

## 2018-11-27 PROCEDURE — 25000132 ZZH RX MED GY IP 250 OP 250 PS 637: Performed by: ORTHOPAEDIC SURGERY

## 2018-11-27 PROCEDURE — 40000193 ZZH STATISTIC PT WARD VISIT: Performed by: PHYSICAL THERAPIST

## 2018-11-27 PROCEDURE — 25000128 H RX IP 250 OP 636: Performed by: ORTHOPAEDIC SURGERY

## 2018-11-27 PROCEDURE — 99207 ZZC CONSULT E&M CHANGED TO INITIAL LEVEL: CPT | Performed by: INTERNAL MEDICINE

## 2018-11-27 PROCEDURE — 97162 PT EVAL MOD COMPLEX 30 MIN: CPT | Mod: GP | Performed by: PHYSICAL THERAPIST

## 2018-11-27 PROCEDURE — A9270 NON-COVERED ITEM OR SERVICE: HCPCS | Performed by: ORTHOPAEDIC SURGERY

## 2018-11-27 PROCEDURE — P9041 ALBUMIN (HUMAN),5%, 50ML: HCPCS | Performed by: NURSE ANESTHETIST, CERTIFIED REGISTERED

## 2018-11-27 PROCEDURE — 37000008 ZZH ANESTHESIA TECHNICAL FEE, 1ST 30 MIN: Performed by: ORTHOPAEDIC SURGERY

## 2018-11-27 PROCEDURE — 85027 COMPLETE CBC AUTOMATED: CPT | Performed by: ORTHOPAEDIC SURGERY

## 2018-11-27 PROCEDURE — C1776 JOINT DEVICE (IMPLANTABLE): HCPCS | Performed by: ORTHOPAEDIC SURGERY

## 2018-11-27 PROCEDURE — 27210794 ZZH OR GENERAL SUPPLY STERILE: Performed by: ORTHOPAEDIC SURGERY

## 2018-11-27 PROCEDURE — 86901 BLOOD TYPING SEROLOGIC RH(D): CPT | Performed by: ANESTHESIOLOGY

## 2018-11-27 PROCEDURE — 85018 HEMOGLOBIN: CPT | Performed by: ORTHOPAEDIC SURGERY

## 2018-11-27 PROCEDURE — 25000125 ZZHC RX 250: Performed by: ORTHOPAEDIC SURGERY

## 2018-11-27 DEVICE — IMPLANTABLE DEVICE: Type: IMPLANTABLE DEVICE | Site: HIP | Status: FUNCTIONAL

## 2018-11-27 DEVICE — IMP LINER DEPUY PINNACLE ALTRX 36X60MM +4 1221-36-460: Type: IMPLANTABLE DEVICE | Site: HIP | Status: FUNCTIONAL

## 2018-11-27 DEVICE — IMP CUP ACE PINNACLE 60MM 1217-22-060: Type: IMPLANTABLE DEVICE | Site: HIP | Status: FUNCTIONAL

## 2018-11-27 DEVICE — IMP SCR BONE CAN ACE 6.5X20MM 1217-20-500: Type: IMPLANTABLE DEVICE | Site: HIP | Status: FUNCTIONAL

## 2018-11-27 DEVICE — IMP HEAD FEMORAL DEPUY CERAMIC 36MM +5MM 136536320: Type: IMPLANTABLE DEVICE | Site: HIP | Status: FUNCTIONAL

## 2018-11-27 DEVICE — IMP APEX HOLE ELIMINATOR HIP DEPUY DURALOC 1246-03-000: Type: IMPLANTABLE DEVICE | Site: HIP | Status: FUNCTIONAL

## 2018-11-27 DEVICE — IMP SCR BONE CAN ACE 6.5X25MM 1217-25-500: Type: IMPLANTABLE DEVICE | Site: HIP | Status: FUNCTIONAL

## 2018-11-27 RX ORDER — ONDANSETRON 4 MG/1
4 TABLET, ORALLY DISINTEGRATING ORAL EVERY 30 MIN PRN
Status: DISCONTINUED | OUTPATIENT
Start: 2018-11-27 | End: 2018-11-27 | Stop reason: HOSPADM

## 2018-11-27 RX ORDER — ONDANSETRON 4 MG/1
4 TABLET, ORALLY DISINTEGRATING ORAL EVERY 6 HOURS PRN
Status: DISCONTINUED | OUTPATIENT
Start: 2018-11-27 | End: 2018-12-05 | Stop reason: HOSPADM

## 2018-11-27 RX ORDER — ALBUMIN, HUMAN INJ 5% 5 %
250 SOLUTION INTRAVENOUS ONCE
Status: COMPLETED | OUTPATIENT
Start: 2018-11-27 | End: 2018-11-27

## 2018-11-27 RX ORDER — SODIUM CHLORIDE, SODIUM LACTATE, POTASSIUM CHLORIDE, CALCIUM CHLORIDE 600; 310; 30; 20 MG/100ML; MG/100ML; MG/100ML; MG/100ML
INJECTION, SOLUTION INTRAVENOUS CONTINUOUS
Status: DISCONTINUED | OUTPATIENT
Start: 2018-11-27 | End: 2018-12-01

## 2018-11-27 RX ORDER — NALOXONE HYDROCHLORIDE 0.4 MG/ML
.1-.4 INJECTION, SOLUTION INTRAMUSCULAR; INTRAVENOUS; SUBCUTANEOUS
Status: DISCONTINUED | OUTPATIENT
Start: 2018-11-27 | End: 2018-12-05 | Stop reason: HOSPADM

## 2018-11-27 RX ORDER — ALBUTEROL SULFATE 0.83 MG/ML
2.5 SOLUTION RESPIRATORY (INHALATION) EVERY 4 HOURS PRN
Status: DISCONTINUED | OUTPATIENT
Start: 2018-11-27 | End: 2018-11-27 | Stop reason: HOSPADM

## 2018-11-27 RX ORDER — FLUTICASONE PROPIONATE 50 MCG
1 SPRAY, SUSPENSION (ML) NASAL DAILY
Status: DISCONTINUED | OUTPATIENT
Start: 2018-11-28 | End: 2018-12-05 | Stop reason: HOSPADM

## 2018-11-27 RX ORDER — CEFAZOLIN SODIUM 1 G/3ML
1 INJECTION, POWDER, FOR SOLUTION INTRAMUSCULAR; INTRAVENOUS SEE ADMIN INSTRUCTIONS
Status: DISCONTINUED | OUTPATIENT
Start: 2018-11-27 | End: 2018-11-27 | Stop reason: HOSPADM

## 2018-11-27 RX ORDER — DEXAMETHASONE SODIUM PHOSPHATE 4 MG/ML
INJECTION, SOLUTION INTRA-ARTICULAR; INTRALESIONAL; INTRAMUSCULAR; INTRAVENOUS; SOFT TISSUE PRN
Status: DISCONTINUED | OUTPATIENT
Start: 2018-11-27 | End: 2018-11-27

## 2018-11-27 RX ORDER — FENTANYL CITRATE 50 UG/ML
25-50 INJECTION, SOLUTION INTRAMUSCULAR; INTRAVENOUS
Status: DISCONTINUED | OUTPATIENT
Start: 2018-11-27 | End: 2018-11-27 | Stop reason: HOSPADM

## 2018-11-27 RX ORDER — ACETAMINOPHEN 325 MG/1
650 TABLET ORAL EVERY 4 HOURS PRN
Status: DISCONTINUED | OUTPATIENT
Start: 2018-11-30 | End: 2018-12-05 | Stop reason: HOSPADM

## 2018-11-27 RX ORDER — SODIUM CHLORIDE, SODIUM LACTATE, POTASSIUM CHLORIDE, CALCIUM CHLORIDE 600; 310; 30; 20 MG/100ML; MG/100ML; MG/100ML; MG/100ML
INJECTION, SOLUTION INTRAVENOUS CONTINUOUS PRN
Status: DISCONTINUED | OUTPATIENT
Start: 2018-11-27 | End: 2018-11-27

## 2018-11-27 RX ORDER — LIDOCAINE 40 MG/G
CREAM TOPICAL
Status: DISCONTINUED | OUTPATIENT
Start: 2018-11-27 | End: 2018-11-27 | Stop reason: HOSPADM

## 2018-11-27 RX ORDER — ONDANSETRON 2 MG/ML
4 INJECTION INTRAMUSCULAR; INTRAVENOUS EVERY 6 HOURS PRN
Status: DISCONTINUED | OUTPATIENT
Start: 2018-11-27 | End: 2018-12-05 | Stop reason: HOSPADM

## 2018-11-27 RX ORDER — SODIUM CHLORIDE, SODIUM LACTATE, POTASSIUM CHLORIDE, CALCIUM CHLORIDE 600; 310; 30; 20 MG/100ML; MG/100ML; MG/100ML; MG/100ML
INJECTION, SOLUTION INTRAVENOUS CONTINUOUS
Status: DISCONTINUED | OUTPATIENT
Start: 2018-11-27 | End: 2018-11-27 | Stop reason: HOSPADM

## 2018-11-27 RX ORDER — LIDOCAINE 40 MG/G
CREAM TOPICAL
Status: DISCONTINUED | OUTPATIENT
Start: 2018-11-27 | End: 2018-12-05 | Stop reason: HOSPADM

## 2018-11-27 RX ORDER — HYDROMORPHONE HYDROCHLORIDE 1 MG/ML
.3-.5 INJECTION, SOLUTION INTRAMUSCULAR; INTRAVENOUS; SUBCUTANEOUS EVERY 5 MIN PRN
Status: DISCONTINUED | OUTPATIENT
Start: 2018-11-27 | End: 2018-11-27 | Stop reason: HOSPADM

## 2018-11-27 RX ORDER — ONDANSETRON 2 MG/ML
4 INJECTION INTRAMUSCULAR; INTRAVENOUS EVERY 30 MIN PRN
Status: DISCONTINUED | OUTPATIENT
Start: 2018-11-27 | End: 2018-11-27 | Stop reason: HOSPADM

## 2018-11-27 RX ORDER — HYDROMORPHONE HYDROCHLORIDE 2 MG/1
2-4 TABLET ORAL EVERY 4 HOURS PRN
Status: DISCONTINUED | OUTPATIENT
Start: 2018-11-27 | End: 2018-11-29

## 2018-11-27 RX ORDER — GABAPENTIN 600 MG/1
600 TABLET ORAL 3 TIMES DAILY
Status: DISCONTINUED | OUTPATIENT
Start: 2018-11-27 | End: 2018-11-30

## 2018-11-27 RX ORDER — LORAZEPAM 2 MG/ML
.5-1 INJECTION INTRAMUSCULAR
Status: DISCONTINUED | OUTPATIENT
Start: 2018-11-27 | End: 2018-11-27 | Stop reason: HOSPADM

## 2018-11-27 RX ORDER — NEOSTIGMINE METHYLSULFATE 1 MG/ML
VIAL (ML) INJECTION PRN
Status: DISCONTINUED | OUTPATIENT
Start: 2018-11-27 | End: 2018-11-27

## 2018-11-27 RX ORDER — PROPOFOL 10 MG/ML
INJECTION, EMULSION INTRAVENOUS PRN
Status: DISCONTINUED | OUTPATIENT
Start: 2018-11-27 | End: 2018-11-27

## 2018-11-27 RX ORDER — MEPERIDINE HYDROCHLORIDE 25 MG/ML
12.5 INJECTION INTRAMUSCULAR; INTRAVENOUS; SUBCUTANEOUS EVERY 5 MIN PRN
Status: DISCONTINUED | OUTPATIENT
Start: 2018-11-27 | End: 2018-11-27 | Stop reason: HOSPADM

## 2018-11-27 RX ORDER — GLYCOPYRROLATE 0.2 MG/ML
INJECTION, SOLUTION INTRAMUSCULAR; INTRAVENOUS PRN
Status: DISCONTINUED | OUTPATIENT
Start: 2018-11-27 | End: 2018-11-27

## 2018-11-27 RX ORDER — ALBUTEROL SULFATE 0.83 MG/ML
2.5 SOLUTION RESPIRATORY (INHALATION)
Status: DISCONTINUED | OUTPATIENT
Start: 2018-11-27 | End: 2018-11-27 | Stop reason: HOSPADM

## 2018-11-27 RX ORDER — AMOXICILLIN 250 MG
2 CAPSULE ORAL 2 TIMES DAILY
Status: DISCONTINUED | OUTPATIENT
Start: 2018-11-27 | End: 2018-12-05 | Stop reason: HOSPADM

## 2018-11-27 RX ORDER — CEFAZOLIN SODIUM 2 G/100ML
2 INJECTION, SOLUTION INTRAVENOUS EVERY 8 HOURS
Status: COMPLETED | OUTPATIENT
Start: 2018-11-27 | End: 2018-11-28

## 2018-11-27 RX ORDER — NALOXONE HYDROCHLORIDE 0.4 MG/ML
.1-.4 INJECTION, SOLUTION INTRAMUSCULAR; INTRAVENOUS; SUBCUTANEOUS
Status: DISCONTINUED | OUTPATIENT
Start: 2018-11-27 | End: 2018-11-27

## 2018-11-27 RX ORDER — CARVEDILOL 3.12 MG/1
3.12 TABLET ORAL 2 TIMES DAILY WITH MEALS
Status: DISCONTINUED | OUTPATIENT
Start: 2018-11-27 | End: 2018-11-30

## 2018-11-27 RX ORDER — TORSEMIDE 20 MG/1
20 TABLET ORAL DAILY
Status: DISCONTINUED | OUTPATIENT
Start: 2018-11-28 | End: 2018-11-28

## 2018-11-27 RX ORDER — HYDROMORPHONE HYDROCHLORIDE 1 MG/ML
.3-.5 INJECTION, SOLUTION INTRAMUSCULAR; INTRAVENOUS; SUBCUTANEOUS
Status: DISCONTINUED | OUTPATIENT
Start: 2018-11-27 | End: 2018-12-05 | Stop reason: HOSPADM

## 2018-11-27 RX ORDER — ONDANSETRON 2 MG/ML
INJECTION INTRAMUSCULAR; INTRAVENOUS PRN
Status: DISCONTINUED | OUTPATIENT
Start: 2018-11-27 | End: 2018-11-27

## 2018-11-27 RX ORDER — KETOROLAC TROMETHAMINE 15 MG/ML
15 INJECTION, SOLUTION INTRAMUSCULAR; INTRAVENOUS
Status: DISCONTINUED | OUTPATIENT
Start: 2018-11-27 | End: 2018-11-27 | Stop reason: HOSPADM

## 2018-11-27 RX ORDER — EPHEDRINE SULFATE 50 MG/ML
INJECTION, SOLUTION INTRAMUSCULAR; INTRAVENOUS; SUBCUTANEOUS PRN
Status: DISCONTINUED | OUTPATIENT
Start: 2018-11-27 | End: 2018-11-27

## 2018-11-27 RX ORDER — HYDROXYZINE HYDROCHLORIDE 10 MG/1
10 TABLET, FILM COATED ORAL EVERY 6 HOURS PRN
Status: DISCONTINUED | OUTPATIENT
Start: 2018-11-27 | End: 2018-12-05 | Stop reason: HOSPADM

## 2018-11-27 RX ORDER — CEFAZOLIN SODIUM 2 G/100ML
2 INJECTION, SOLUTION INTRAVENOUS
Status: COMPLETED | OUTPATIENT
Start: 2018-11-27 | End: 2018-11-27

## 2018-11-27 RX ORDER — FENTANYL CITRATE 50 UG/ML
INJECTION, SOLUTION INTRAMUSCULAR; INTRAVENOUS PRN
Status: DISCONTINUED | OUTPATIENT
Start: 2018-11-27 | End: 2018-11-27

## 2018-11-27 RX ORDER — ACETAMINOPHEN 325 MG/1
975 TABLET ORAL EVERY 8 HOURS
Status: DISPENSED | OUTPATIENT
Start: 2018-11-27 | End: 2018-11-30

## 2018-11-27 RX ORDER — ALBUMIN, HUMAN INJ 5% 5 %
SOLUTION INTRAVENOUS CONTINUOUS PRN
Status: DISCONTINUED | OUTPATIENT
Start: 2018-11-27 | End: 2018-11-27

## 2018-11-27 RX ORDER — AMOXICILLIN 250 MG
1 CAPSULE ORAL 2 TIMES DAILY
Status: DISCONTINUED | OUTPATIENT
Start: 2018-11-27 | End: 2018-12-05 | Stop reason: HOSPADM

## 2018-11-27 RX ORDER — VECURONIUM BROMIDE 1 MG/ML
INJECTION, POWDER, LYOPHILIZED, FOR SOLUTION INTRAVENOUS PRN
Status: DISCONTINUED | OUTPATIENT
Start: 2018-11-27 | End: 2018-11-27

## 2018-11-27 RX ADMIN — ALBUMIN HUMAN: 0.05 INJECTION, SOLUTION INTRAVENOUS at 10:15

## 2018-11-27 RX ADMIN — VECURONIUM BROMIDE 1 MG: 1 INJECTION, POWDER, LYOPHILIZED, FOR SOLUTION INTRAVENOUS at 09:30

## 2018-11-27 RX ADMIN — CEFAZOLIN SODIUM 2 G: 2 INJECTION, SOLUTION INTRAVENOUS at 08:00

## 2018-11-27 RX ADMIN — SODIUM CHLORIDE, POTASSIUM CHLORIDE, SODIUM LACTATE AND CALCIUM CHLORIDE: 600; 310; 30; 20 INJECTION, SOLUTION INTRAVENOUS at 12:06

## 2018-11-27 RX ADMIN — DEXMEDETOMIDINE HYDROCHLORIDE 0.3 MCG/KG/HR: 100 INJECTION, SOLUTION INTRAVENOUS at 08:00

## 2018-11-27 RX ADMIN — Medication 0.3 MG: at 14:41

## 2018-11-27 RX ADMIN — FENTANYL CITRATE 50 MCG: 50 INJECTION, SOLUTION INTRAMUSCULAR; INTRAVENOUS at 08:15

## 2018-11-27 RX ADMIN — HYDROMORPHONE HYDROCHLORIDE 0.5 MG: 1 INJECTION, SOLUTION INTRAMUSCULAR; INTRAVENOUS; SUBCUTANEOUS at 09:10

## 2018-11-27 RX ADMIN — GABAPENTIN 600 MG: 600 TABLET, FILM COATED ORAL at 16:35

## 2018-11-27 RX ADMIN — ONDANSETRON 4 MG: 2 INJECTION INTRAMUSCULAR; INTRAVENOUS at 10:30

## 2018-11-27 RX ADMIN — ACETAMINOPHEN 975 MG: 325 TABLET, FILM COATED ORAL at 14:41

## 2018-11-27 RX ADMIN — SODIUM CHLORIDE, POTASSIUM CHLORIDE, SODIUM LACTATE AND CALCIUM CHLORIDE: 600; 310; 30; 20 INJECTION, SOLUTION INTRAVENOUS at 08:30

## 2018-11-27 RX ADMIN — SODIUM CHLORIDE, POTASSIUM CHLORIDE, SODIUM LACTATE AND CALCIUM CHLORIDE: 600; 310; 30; 20 INJECTION, SOLUTION INTRAVENOUS at 18:44

## 2018-11-27 RX ADMIN — GLYCOPYRROLATE 0.1 MG: 0.2 INJECTION, SOLUTION INTRAMUSCULAR; INTRAVENOUS at 09:00

## 2018-11-27 RX ADMIN — Medication 0.3 MG: at 13:01

## 2018-11-27 RX ADMIN — Medication 5 MG: at 08:00

## 2018-11-27 RX ADMIN — Medication 0.5 MG: at 16:43

## 2018-11-27 RX ADMIN — GLYCOPYRROLATE 0.8 MG: 0.2 INJECTION, SOLUTION INTRAMUSCULAR; INTRAVENOUS at 10:45

## 2018-11-27 RX ADMIN — CARVEDILOL 3.12 MG: 3.12 TABLET, FILM COATED ORAL at 18:44

## 2018-11-27 RX ADMIN — CEFAZOLIN SODIUM 1 G: 2 INJECTION, SOLUTION INTRAVENOUS at 09:45

## 2018-11-27 RX ADMIN — ALBUMIN HUMAN 250 ML: 0.05 INJECTION, SOLUTION INTRAVENOUS at 12:10

## 2018-11-27 RX ADMIN — HYDROMORPHONE HYDROCHLORIDE 2 MG: 2 TABLET ORAL at 18:53

## 2018-11-27 RX ADMIN — SODIUM CHLORIDE, POTASSIUM CHLORIDE, SODIUM LACTATE AND CALCIUM CHLORIDE: 600; 310; 30; 20 INJECTION, SOLUTION INTRAVENOUS at 06:50

## 2018-11-27 RX ADMIN — CEFAZOLIN SODIUM 2 G: 2 INJECTION, SOLUTION INTRAVENOUS at 18:44

## 2018-11-27 RX ADMIN — VECURONIUM BROMIDE 2 MG: 1 INJECTION, POWDER, LYOPHILIZED, FOR SOLUTION INTRAVENOUS at 08:45

## 2018-11-27 RX ADMIN — SODIUM CHLORIDE, POTASSIUM CHLORIDE, SODIUM LACTATE AND CALCIUM CHLORIDE: 600; 310; 30; 20 INJECTION, SOLUTION INTRAVENOUS at 07:15

## 2018-11-27 RX ADMIN — PROPOFOL 140 MG: 10 INJECTION, EMULSION INTRAVENOUS at 07:45

## 2018-11-27 RX ADMIN — SODIUM CHLORIDE, POTASSIUM CHLORIDE, SODIUM LACTATE AND CALCIUM CHLORIDE: 600; 310; 30; 20 INJECTION, SOLUTION INTRAVENOUS at 10:41

## 2018-11-27 RX ADMIN — FENTANYL CITRATE 100 MCG: 50 INJECTION, SOLUTION INTRAMUSCULAR; INTRAVENOUS at 07:30

## 2018-11-27 RX ADMIN — ALBUMIN HUMAN: 0.05 INJECTION, SOLUTION INTRAVENOUS at 09:35

## 2018-11-27 RX ADMIN — VECURONIUM BROMIDE 2 MG: 1 INJECTION, POWDER, LYOPHILIZED, FOR SOLUTION INTRAVENOUS at 08:15

## 2018-11-27 RX ADMIN — PHENYLEPHRINE HYDROCHLORIDE 0.25 MCG/KG/MIN: 10 INJECTION, SOLUTION INTRAMUSCULAR; INTRAVENOUS; SUBCUTANEOUS at 08:00

## 2018-11-27 RX ADMIN — Medication 1 LOZENGE: at 14:41

## 2018-11-27 RX ADMIN — ALBUMIN HUMAN 250 ML: 0.05 INJECTION, SOLUTION INTRAVENOUS at 13:20

## 2018-11-27 RX ADMIN — NEOSTIGMINE METHYLSULFATE 4 MG: 1 INJECTION, SOLUTION INTRAVENOUS at 10:45

## 2018-11-27 RX ADMIN — GLYCOPYRROLATE 0.1 MG: 0.2 INJECTION, SOLUTION INTRAMUSCULAR; INTRAVENOUS at 08:00

## 2018-11-27 RX ADMIN — MIDAZOLAM 1 MG: 1 INJECTION INTRAMUSCULAR; INTRAVENOUS at 07:30

## 2018-11-27 RX ADMIN — HYDROMORPHONE HYDROCHLORIDE 0.5 MG: 1 INJECTION, SOLUTION INTRAMUSCULAR; INTRAVENOUS; SUBCUTANEOUS at 10:15

## 2018-11-27 RX ADMIN — HYDROMORPHONE HYDROCHLORIDE 4 MG: 2 TABLET ORAL at 22:37

## 2018-11-27 RX ADMIN — SENNOSIDES AND DOCUSATE SODIUM 2 TABLET: 8.6; 5 TABLET ORAL at 21:39

## 2018-11-27 RX ADMIN — GABAPENTIN 600 MG: 600 TABLET, FILM COATED ORAL at 21:39

## 2018-11-27 RX ADMIN — PHENYLEPHRINE HYDROCHLORIDE 100 MCG: 10 INJECTION, SOLUTION INTRAMUSCULAR; INTRAVENOUS; SUBCUTANEOUS at 07:45

## 2018-11-27 RX ADMIN — DEXAMETHASONE SODIUM PHOSPHATE 4 MG: 4 INJECTION, SOLUTION INTRA-ARTICULAR; INTRALESIONAL; INTRAMUSCULAR; INTRAVENOUS; SOFT TISSUE at 08:15

## 2018-11-27 RX ADMIN — ROCURONIUM BROMIDE 50 MG: 10 INJECTION INTRAVENOUS at 07:45

## 2018-11-27 ASSESSMENT — LIFESTYLE VARIABLES: TOBACCO_USE: 0

## 2018-11-27 ASSESSMENT — ENCOUNTER SYMPTOMS
SEIZURES: 0
DYSRHYTHMIAS: 1

## 2018-11-27 ASSESSMENT — ACTIVITIES OF DAILY LIVING (ADL)
ADLS_ACUITY_SCORE: 11
ADLS_ACUITY_SCORE: 11

## 2018-11-27 ASSESSMENT — COPD QUESTIONNAIRES: COPD: 0

## 2018-11-27 NOTE — PROGRESS NOTES
Admission medication history interview status for the 11/27/2018  admission is complete. See EPIC admission navigator for prior to admission medications     Medication history source reliability:Good    Medication history interview source(s):Patient    Medication history resources (including written lists, pill bottles, clinic record):None    Primary pharmacy. [facility] on [address]    Additional medication history information not noted on PTA med list :None    Time spent in this activity: 30 minutes    Prior to Admission medications    Medication Sig Last Dose Taking? Auth Provider   aspirin 81 MG tablet Take 81 mg by mouth daily 11/22/2018 at pm Yes Reported, Patient   carvedilol (COREG) 3.125 MG tablet Take 1 tablet (3.125 mg) by mouth 2 times daily (with meals) 11/27/2018 at 0500 Yes Main Hardy MD   fluticasone (FLONASE) 50 MCG/ACT spray Spray 1 spray into both nostrils daily 11/27/2018 at 0500 Yes Reported, Patient   gabapentin (NEURONTIN) 600 MG tablet Take 1 tablet (600 mg) by mouth 3 times daily 11/27/2018 at 0500 Yes Main Hardy MD   HYDROmorphone HCl (DILAUDID PO) Take 2 mg by mouth every 6 hours as needed for moderate to severe pain 11/13/2018 at prn Yes Reported, Patient   Rivaroxaban (XARELTO PO) Take 20 mg by mouth daily 11/22/2018 at pm Yes Reported, Patient   torsemide (DEMADEX) 20 MG tablet Take 20 mg by mouth daily 10/27/2018 Yes Unknown, Entered By History

## 2018-11-27 NOTE — BRIEF OP NOTE
[facility] Brief Operative Note    Pre-operative diagnosis: DJD RIGHT HIP   Post-operative diagnosis * No post-op diagnosis entered *  Right hip osteoarthritis   Procedure: Procedure(s):  RIGHT TOTAL HIP ARTHROPLASTY   Surgeon(s): Surgeon(s) and Role:     * Saima Howard MD - Primary     * Iftikhar Santamaria PA-C - Assisting   Estimated blood loss: 1200 mL    Specimens: * No specimens in log *   Findings: Right hip osteoarthritis

## 2018-11-27 NOTE — ANESTHESIA CARE TRANSFER NOTE
Patient: Antonio Ramirez    Procedure(s):  RIGHT TOTAL HIP ARTHROPLASTY    Diagnosis: DJD RIGHT HIP  Diagnosis Additional Information: No value filed.    Anesthesia Type:   General, ETT     Note:  Airway :Face Mask  Patient transferred to:PACU  Comments: Transferred to Main PACU  arousable breathing adequately by self.   Stable .  Report to Marli ROBINSHandoff Report: Identifed the Patient, Identified the Reponsible Provider, Reviewed the pertinent medical history, Discussed the surgical course, Reviewed Intra-OP anesthesia mangement and issues during anesthesia, Set expectations for post-procedure period and Allowed opportunity for questions and acknowledgement of understanding      Vitals: (Last set prior to Anesthesia Care Transfer)    CRNA VITALS  11/27/2018 1059 - 11/27/2018 1141      11/27/2018             Pulse: 67    ART BP: 112/52    ART Mean: 74    SpO2: 95 %    Resp Rate (set): 8                Electronically Signed By: DIPAK Sharma CRNA  November 27, 2018  11:41 AM

## 2018-11-27 NOTE — PLAN OF CARE
Problem: Patient Care Overview  Goal: Plan of Care/Patient Progress Review  PT eval completed and treatment initiated.  Pt admitted for TOBI, R WBAT, ant precautions.  Pt with hx of L BKA with prosthetic.  Pt's previous level of function was I at home with spouse in apt, recently using SEC due to pain.       Discharge Planner PT   Patient plan for discharge: home but open to TCU  Current status: Per RN pt with increased blood loss from surgery, up to dangle only, dudley LE ex with A, supine to sit with min A x 1 and mod A x 1 with cues for precautions, sat x 5 min.  BP in sitting 102/66, supine 91/56.  Barriers to return to prior living situation: currently not amb and has not transferred,  yet to be assessed  Recommendations for discharge: TCU, will cont to assess as able to mobilize  Rationale for recommendations: Pt will benefit from cont therapy to progress strength, ROM and act dudley to improve functional mob I and safety with goal to return to home with spouse,       Entered by: ALEJANDRA HANSON 11/27/2018 4:22 PM

## 2018-11-27 NOTE — PLAN OF CARE
Problem: Patient Care Overview  Goal: Plan of Care/Patient Progress Review  Outcome: Improving  Pt rating pain 7 to 8 out of 10 upon arrival from PACU. Dilaudid given per request. Baseline numbness of RLE, mild edema noted. Levin present. Left stump ace wrapped. Pt on 4 liters per minutes nasal canula. Sleeping in between cares. Oriented to room.

## 2018-11-27 NOTE — PROGRESS NOTES
11/27/18 1500   Quick Adds   Type of Visit Initial PT Evaluation   Living Environment   Lives With spouse   Living Arrangements apartment   Home Accessibility no concerns   Transportation Available car;family or friend will provide   Self-Care   Equipment Currently Used at Home cane, straight;wheelchair, manual;walker, rolling;shower chair;grab bar   Functional Level Prior   Ambulation 1-->assistive equipment   Transferring 1-->assistive equipment   Toileting 0-->independent   Bathing 0-->independent   Dressing 0-->independent   Eating 0-->independent   Communication 0-->understands/communicates without difficulty   Swallowing 0-->swallows foods/liquids without difficulty   Cognition 0 - no cognition issues reported   Fall history within last six months yes   Number of times patient has fallen within last six months 1   Which of the above functional risks had a recent onset or change? ambulation;transferring   Prior Functional Level Comment Pt started using SEC in last few months due to pain, was I with amb prior with prostheitic on L LE   General Information   Onset of Illness/Injury or Date of Surgery - Date 11/26/18   Referring Physician Heath   Patient/Family Goals Statement pt plans to go home but open to TCU if needed   Pertinent History of Current Problem (include personal factors and/or comorbidities that impact the POC) pt s/p TOBI   Precautions/Limitations fall precautions;right hip precautions   Weight-Bearing Status - LUE full weight-bearing   Weight-Bearing Status - RUE full weight-bearing   Weight-Bearing Status - LLE full weight-bearing   Weight-Bearing Status - RLE weight-bearing as tolerated   Cognitive Status Examination   Orientation orientation to person, place and time   Level of Consciousness alert   Follows Commands and Answers Questions 100% of the time   Personal Safety and Judgment intact   Pain Assessment   Patient Currently in Pain Yes, see Vital Sign flowsheet  (3/10)  "  Integumentary/Edema   Integumentary/Edema Comments R hip per surgery   Range of Motion (ROM)   ROM Comment R LE limited s/p surgery and restrictions   Strength   Strength Comments R LE limited s/p surgery due to pain, A for SAQ's and to move LE in and out of bed   Bed Mobility   Bed Mobility Comments bed mob with mod A x 1 and min A x 1   Transfer Skills   Transfer Comments NT   Gait   Gait Comments NT   Balance   Balance Comments sitting balance good, standing NT   Muscle Tone   Muscle Tone no deficits were identified   General Therapy Interventions   Planned Therapy Interventions bed mobility training;gait training;ROM;strengthening;stretching;transfer training   Clinical Impression   Criteria for Skilled Therapeutic Intervention yes, treatment indicated   PT Diagnosis difficulty with all mob   Influenced by the following impairments post op pain, decreased ROM, strength, and act dudley, post op hypotensioni, L LE BKA   Functional limitations due to impairments impaired functional mob I and safety   Clinical Presentation Evolving/Changing   Clinical Presentation Rationale 3 -5 deficts   Clinical Decision Making (Complexity) Moderate complexity   Therapy Frequency` 2 times/day   Predicted Duration of Therapy Intervention (days/wks) 3 days   Anticipated Discharge Disposition Transitional Care Facility   Risk & Benefits of therapy have been explained Yes   Patient, Family & other staff in agreement with plan of care Yes   [facility] [employer] TM \"6 Clicks\"   2016, Trustees of [facility], under license to [employer].  All rights reserved.   6 Clicks Short Forms Basic Mobility Inpatient Short Form   [facility]AC  \"6 Clicks\" V.2 Basic Mobility Inpatient Short Form   1. Turning from your back to your side while in a flat bed without using bedrails? 2 - A Lot   2. Moving from lying on your back to sitting on the side of a flat bed without using bedrails? 2 - A Lot   3. Moving to and from a bed to a " chair (including a wheelchair)? 2 - A Lot   4. Standing up from a chair using your arms (e.g., wheelchair, or bedside chair)? 2 - A Lot   5. To walk in hospital room? 2 - A Lot   6. Climbing 3-5 steps with a railing? 1 - Total   Basic Mobility Raw Score (Score out of 24.Lower scores equate to lower levels of function) 11   Total Evaluation Time   Total Evaluation Time (Minutes) 13

## 2018-11-27 NOTE — CONSULTS
[facility]    Hospitalist Consultation    Date of Admission:  11/27/2018    Assessment & Plan   Antonio Ramirez is a 73 year old male with a history of pulmonary HTN, paroxysmal a fib, 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  We were consulted for assistance with medical management     Right total hip arthroplasty POD #0  Patient underwent planned right hip arthroplasty by Dr. Howard on 11/27/18.  EBL 1200 mL.    - Will defer post-op management including pain control, IVF, diet, DVT prophylaxis and disposition to primary service    Ischemic cardiomyopathy w/reduced EF.  Not decompensated   CAD s/p CABG in 2007  HTN/HLP  Paroxysmal a fib   Supposed history of antiphospholipid antibody/anticardiolpin antibody  An echocardiogram on 7/11/18 showed EF of 40-45% with anterior, septal and apical wall akinesis.  No issues currently  At this time patient sounds regular.  Was on Xarelto prior to surgery and this is being held   - Continue to monitor  - Continue PTA Coreg  - Torsemide restarted by orthopedics.  If starts to have issues with PO may need to stop  - Holding ASA   - Restart Xarelto once able per surgery    Aortic root dilation   Last echo in July also showed the aortic sinus of Valsalva mildly dilated at 4.3 cm and ascending aorta was borderline at 3.7 cm   - Follow up as planned     H/o Alcohol abuse   Possible alcoholic liver disease w/cirrhosis and ascites  Had been drinking 3-4 drinks/night but supposedly quit in 2015 tough an ETOH level from 10/31/18 was 0.10.  Patient carries the history of decompensated alcoholic liver cirrhosis with ascites but CT imaging of the abdomen makes no mention of cirrhosis.  He has had ultrasounds in the past that have showed ascites and has had a paracentesis as recently as 10/30/18.  Patient not forthcoming with ETOH on my evaluation   No  "signs of withdrawal at this time  - Continue to monitor.  If develops withdrawal symptoms will order CIWA.         DVT Prophylaxis: Defer to primary service  Code Status: Full Code    Disposition: Expected discharge per primary service    Spencer Jorgensen DO    Reason for Consult   Reason for consult: Medical management    Primary Care Physician   Main Hardy    Chief Complaint   Hip pain    History is obtained from the patient.  His history is limited as the patient is not forthcoming with his history and this was obtained on chart review    History of Present Illness   Antonio Ramirez is a 73 year old male with a history of pulmonary HTN, paroxysmal a fib, 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  Patient seen post op.  No issues currently.  States his pain is well controlled but rates it as a 8/10.  Patient is not forthcoming with information.  When asked about medical history he states \"isn't it your job to know.\"  At this time patient has no concerns.     Past Medical History    I have reviewed this patient's medical history and updated it with pertinent information if needed.   Past Medical History:   Diagnosis Date     Alcoholism (H)      Amputated left leg (H)      Anemia      Anticardiolipin antibody syndrome (H)      Antiphospholipid antibody syndrome (H)      Antiplatelet or antithrombotic long-term use      Aortic root dilatation (H)      Aortic stenosis      Arthritis      Balance problem      Coronary artery disease     CABG 2007: SVG to LAD, SVG to diagonal, SVG to OM; cardiac cath 5/17/18: thrombectomy for restenosis of stents-sent for urgent CABG, cath 5/14/2007: GRECIA x2 to LAD, Grecia to diagonal     Gastro-oesophageal reflux disease      Heart attack (H) 2007    apparently arrested, cardiac X5     Hiatal hernia      Hypercholesterolemia      Hypertension      Ischemic " cardiomyopathy      Left foot drop      Liver disease     alcoholic liver disease, alcoholic cirrohsosis, portal hypertension     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)     ?When diagnosed, patient not aware of this     Moderate mitral regurgitation      Monoclonal gammopathy      Neuropathy      Noninfectious ileitis     diverticulitis of colon     Nonrheumatic tricuspid valve regurgitation      Paroxysmal atrial fibrillation (H)      PE (pulmonary embolism) 2006    saddle emboli 2006     Prostate cancer (H) 2000    Treated with radiation (seed implants in 2000) -- no problems since     Pulmonary hypertension (H)      Renal disease     kidney stones     Syncope      Thrombocytopenia (H)      Urethral stricture      Venous thrombosis     IVC filter and lysis procedures 2007       Past Surgical History   I have reviewed this patient's surgical history and updated it with pertinent information if needed.  Past Surgical History:   Procedure Laterality Date     AMPUTATE LEG BELOW KNEE Left 04/2014    related to gangrene, started as foot ulcer related to neuropathy     AMPUTATE LEG BELOW KNEE Left 12/4/2017    Procedure: AMPUTATE LEG BELOW KNEE;  Exploration of Left Leg Below Knee Amputation Stump with Ligation of Bleeders.;  Surgeon: Leandro Arreola MD;  Location:  OR     APPENDECTOMY       APPLY WOUND VAC Left 12/6/2017    Procedure: APPLY WOUND VAC;;  Surgeon: Saima Howard MD;  Location:  [facility]     ARTHROPLASTY KNEE Right 9/19/2014    Procedure: ARTHROPLASTY KNEE;  Surgeon: Saima Howard MD;  Location:  OR     CARDIAC SURGERY      CABG     CHOLECYSTECTOMY       COLONOSCOPY       COMBINED CYSTOSCOPY, RETROGRADES, EXCHANGE STENT URETER(S) Left 7/24/2018    Procedure: COMBINED CYSTOSCOPY, RETROGRADES, EXCHANGE STENT URETER(S);  CYSTOSCOPY, BILATERAL RETROGRADES, BILATERAL URETERAL STENT EXCHANGE ;  Surgeon: Matt Cervantes MD;  Location:  OR     CRANIOTOMY Right 4/7/2018     Procedure: CRANIOTOMY;  Right Craniotomy For Subdural Hematoma;  Surgeon: Jono Issa MD;  Location:  OR     CYSTOSCOPY, DILATE URETHRA, COMBINED N/A 10/12/2016    Procedure: COMBINED CYSTOSCOPY, DILATE URETHRA;  Surgeon: Dimitry Bello MD;  Location:  OR     CYSTOSCOPY, REMOVE STENT(S), COMBINED Right 7/24/2018    Procedure: COMBINED CYSTOSCOPY, REMOVE STENT(S);;  Surgeon: Jose Hunter MD;  Location:  OR     ENDOSCOPIC STRIPPING VEIN(S)       esophagogastroduodenoscopy       EYE SURGERY      cataracts     GENITOURINARY SURGERY      Prostate Seen Implants     HERNIA REPAIR      Umbilical     INCISION AND DRAINAGE LOWER EXTREMITY, COMBINED Left 12/1/2017    Procedure: COMBINED INCISION AND DRAINAGE LOWER EXTREMITY;  INCISION AND DRAINAGE OF LEFT BELOW KNEE AMPUTATION ABSCESS, WOUND VAC PLACEMENT;  Surgeon: Saima Howard MD;  Location:  OR     IR IVC FILTER PLACEMENT       IRRIGATION AND DEBRIDEMENT LOWER EXTREMITY, COMBINED Left 12/6/2017    Procedure: COMBINED IRRIGATION AND DEBRIDEMENT LOWER EXTREMITY;  IRRIGATION AND DEBRIDEMENT OF LEFT BELOW KNEE AMPUTATION SITE WITH WOUND VAC REPLACEMENT;  Surgeon: Saima Howard MD;  Location:  [facility]     IRRIGATION AND DEBRIDEMENT LOWER EXTREMITY, COMBINED Left 12/8/2017    Procedure: COMBINED IRRIGATION AND DEBRIDEMENT LOWER EXTREMITY;  IRRIGATION AND DEBRIDEMENT OF LEFT BELOW THE KNEE AMPUTATION AND SHORTENING OF THE TIBIA WITH WOUND CLOSURE;  Surgeon: Saima Howard MD;  Location:  OR     LASER HOLMIUM LITHOTRIPSY URETER(S), INSERT STENT, COMBINED Bilateral 6/28/2018    Procedure: COMBINED CYSTOSCOPY, URETEROSCOPY, LASER HOLMIUM LITHOTRIPSY URETER(S), INSERT STENT;  CYSTOSCOPY, BILATERAL URETEROSCOPY,  HOLMIUM LASER LITHOTRIPSY, BILATERAL STENT PLACEMENT, STONE BASKETING;  Surgeon: Jose Hunter MD;  Location:  OR     LASER HOLMIUM LITHOTRIPSY URETER(S), INSERT STENT, COMBINED Left 8/14/2018    Procedure: COMBINED  "CYSTOSCOPY, URETEROSCOPY, LASER HOLMIUM LITHOTRIPSY URETER(S), INSERT STENT;  CYSTOSCOPY, LEFT URETEROSCOPY, LEFT LASER HOLMIUM LITHOTRIPSY URETER(S) REMOVAL BILATERAL STENTS;  Surgeon: Jose Hunter MD;  Location:  OR     ORTHOPEDIC SURGERY      (R) knee scope     ORTHOPEDIC SURGERY      total hip      ORTHOPEDIC SURGERY      elbow surgery       Prior to Admission Medications   Prior to Admission Medications   Prescriptions Last Dose Informant Patient Reported? Taking?   HYDROmorphone HCl (DILAUDID PO) 11/13/2018 at prn Self Yes Yes   Sig: Take 2 mg by mouth every 6 hours as needed for moderate to severe pain   Rivaroxaban (XARELTO PO) 11/22/2018 at pm Self Yes Yes   Sig: Take 20 mg by mouth daily   aspirin 81 MG tablet 11/22/2018 at pm Self Yes Yes   Sig: Take 81 mg by mouth daily   carvedilol (COREG) 3.125 MG tablet 11/27/2018 at 0500 Self Yes Yes   Sig: Take 1 tablet (3.125 mg) by mouth 2 times daily (with meals)   fluticasone (FLONASE) 50 MCG/ACT spray 11/27/2018 at 0500 Self Yes Yes   Sig: Spray 1 spray into both nostrils daily   gabapentin (NEURONTIN) 600 MG tablet 11/27/2018 at 0500 Self No Yes   Sig: Take 1 tablet (600 mg) by mouth 3 times daily   torsemide (DEMADEX) 20 MG tablet 10/27/2018 Self Yes Yes   Sig: Take 20 mg by mouth daily      Facility-Administered Medications: None     Allergies   Allergies   Allergen Reactions     Ciprofloxacin Itching     Severe itching \"like ants were crawling\"     Hmg-Coa-R Inhibitors Other (See Comments)     Rhabdomyolysis occurred within a couple days       Social History   I have reviewed this patient's social history and updated it with pertinent information if needed. Antonio Ramirez  reports that he has never smoked. He has never used smokeless tobacco. He reports that he drinks alcohol. He reports that he does not use illicit drugs.    Family History   I have reviewed this patient's family history and updated it with pertinent information if needed. "   Family History   Problem Relation Age of Onset     Lung Cancer Mother      Heart Failure Father       age 87       Review of Systems   The 10 point Review of Systems is negative other than noted in the HPI    Physical Exam   Temp: 97.8  F (36.6  C) Temp src: Temporal BP: 114/65   Heart Rate: 58 Resp: 12 SpO2: 96 % O2 Device: Nasal cannula Oxygen Delivery: 4 LPM  Vital Signs with Ranges  Temp:  [97.7  F (36.5  C)-97.9  F (36.6  C)] 97.8  F (36.6  C)  Heart Rate:  [57-66] 58  Resp:  [10-18] 12  BP: ()/(52-78) 114/65  MAP:  [61 mmHg-72 mmHg] 72 mmHg  Arterial Line BP: ()/(46-54) 110/53  SpO2:  [94 %-99 %] 96 %  232 lbs 8 oz    onstitutional: Appears older than stated age.  Awake, alert, no apparent distress  Eyes: Conjunctiva and pupils examined and normal  HEENT: Moist mucous membranes, normal dentition  Respiratory: Clear to auscultation bilaterally, no crackles or wheezing  Cardiovascular: Regular rate and rhythm, normal S1 and S2, and 3/6 systolic murmur noted  GI: Soft, non-distended, non-tender, normal bowel sounds  Lymph/Hematologic: No anterior cervical or supraclavicular adenopathy  Skin: No rashes, no cyanosis, no edema  Musculoskeletal: Left BKA   Neurologic:  No focal deficits   Psychiatric: Alert, no obvious anxiety or depression.    Data   -Data reviewed today: All pertinent laboratory and imaging results from this encounter were reviewed. I personally reviewed no images or EKG's today.    Recent Labs  Lab 18  1148 18  1025 18  0612   WBC  --  4.7  --    HGB 10.0* 10.4*  --    MCV  --  96  --    PLT  --  88*  --    POTASSIUM  --   --  3.7       Imaging:  Recent Results (from the past 24 hour(s))   XR Pelvis Port 1/2 Views    Narrative    PELVIS ONE TO TWO VIEW RIGHT 2018 10:05 AM     HISTORY: Intraoperative pelvis film.    COMPARISON: None.      Impression    IMPRESSION: Right hip arthroplasty spacer in anatomic alignment. No  acute osseous abnormality  demonstrated.    CARMEN SERRATO MD

## 2018-11-27 NOTE — IP AVS SNAPSHOT
"` [place] [address] [facility]: [phone]                                              INTERAGENCY TRANSFER FORM - NURSING   2018                    Hospital Admission Date: 2018  GALILEA LUGO   : 1945  Sex: Male        Attending Provider: Saima Howard MD     Allergies:  Ciprofloxacin, Hmg-coa-r Inhibitors    Infection:  None   Service:  SURGERY    Ht:  1.88 m (6' 2\")   Wt:  105.5 kg (232 lb 8 oz)   Admission Wt:  105.5 kg (232 lb 8 oz)    BMI:  29.85 kg/m 2   BSA:  2.35 m 2            Patient PCP Information     Provider PCP Type    Main Hardy MD General      Current Code Status     Date Active Code Status Order ID Comments User Context       2018  2:26 PM Full Code [id number]  Saima Howard MD Inpatient       Questions for Current Code Status     Question Answer Comment    Code status determined by: Unable to determine; FULL CODE until documents or legal decision maker available       Code Status History     Date Active Date Inactive Code Status Order ID Comments User Context    2018  6:02 PM 2018 10:21 PM Full Code [id number]  Jose Hunter MD Inpatient    2018  2:24 PM 2018  6:02 PM Full Code [id number]  Emely Ching PA-C Outpatient    2018  6:42 PM 2018  2:24 PM Full Code [id number]  Matt Cervantes MD Inpatient    2018  6:12 PM 2018  6:42 PM Full Code [id number]  Sandoval Pat,  Inpatient    2018  3:07 PM 2018  6:12 PM Full Code [id number]  Omaira Coleman DO Outpatient    2018  8:51 PM 2018  3:07 PM Full Code [id number]  Solomon Franklin MD Inpatient    2018  3:00 PM 2018  8:51 PM Full Code [id number]  Kyle Bobby Outpatient    2018 12:49 PM 2018  3:00 PM Full Code [id number]  Coy Us MD Inpatient    2018  8:20 AM 2018 12:49 PM Full Code [id number]  Keshia Black MD Outpatient    " 4/7/2018  5:25 PM 4/12/2018  8:20 AM Full Code [id number]  Nidia Coon MD Inpatient    4/7/2018  5:25 PM 4/7/2018  5:25 PM Full Code [id number]  Jono Issa MD Inpatient    1/29/2018  2:10 AM 1/29/2018  4:13 PM Full Code [id number]  Von Gomez MD Inpatient    12/8/2017  9:04 PM 12/10/2017  1:39 PM Full Code [id number]  Saima Howard MD Inpatient    12/6/2017  1:59 PM 12/8/2017  9:04 PM Full Code [id number]  Saima Howard MD Inpatient    12/4/2017 10:29 AM 12/6/2017  1:59 PM Full Code [id number]  Von Gomez MD Inpatient    12/4/2017 10:28 AM 12/4/2017 10:29 AM Full Code [id number]  Lejeune, Stacey, MD Inpatient    12/3/2017  3:04 PM 12/4/2017 10:28 AM Full Code [id number]  Omaira Coleman DO Inpatient    12/1/2017  7:52 PM 12/3/2017  3:04 PM Special Code [id number] Parameters:  Nursing to determine Saima Howard MD Inpatient    11/29/2017  7:56 PM 12/1/2017  7:52 PM Full Code [id number]  Davie Phillips MD Inpatient    9/21/2014  5:31 PM 11/29/2017  7:56 PM Full Code [id number]  Davie Gonzales PA-C Outpatient    9/19/2014  1:39 PM 9/21/2014  5:31 PM Full Code [id number]  Cecelia Garcia MD Inpatient      Advance Directives        Scanned docmt in ACP Activity?           No scanned doc        Hospital Problems as of 12/5/2018              Priority Class Noted POA    S/P total hip arthroplasty Medium  11/27/2018 Yes      Non-Hospital Problems as of 12/5/2018              Priority Class Noted    Alcohol abuse Medium  9/24/2014    Alcoholic cirrhosis of liver (H) Medium  9/24/2014    Chronic kidney disease, stage III (moderate) (H) Medium  9/24/2014    Physical deconditioning Medium  9/24/2014    Prostate cancer -- S/P Radiative Seed implants in 2000 (no problems since) Medium  5/24/2017    Antiphospholipid antibody syndrome (H) Medium  5/24/2017    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 (H) Medium  5/24/2017    Chronic congestive heart  failure, unspecified congestive heart failure type Medium  6/30/2017    Thrombocytopenia -- suspect related to alcohol use Medium  11/29/2017    Subdural hematoma (H) Medium  4/7/2018    Benign essential hypertension Medium  4/27/2018    Malabsorption of iron Medium  6/8/2018    Calculi, ureter Medium  8/14/2018    Anemia Medium  Unknown    Coronary artery disease involving native coronary artery of native heart without angina pectoris Medium  Unknown    Aortic stenosis Medium  Unknown    Nonrheumatic mitral valve regurgitation Medium  Unknown    Nonrheumatic tricuspid valve regurgitation Medium  Unknown    Pulmonary hypertension (H) Medium  Unknown    Paroxysmal atrial fibrillation (H) Medium  Unknown    Ischemic cardiomyopathy Medium  Unknown    Aortic root dilatation (H) Medium  Unknown    Venous thrombosis Medium  Unknown      Immunizations     Name Date      FLU 6-35 months 10/17/13     Influenza (High Dose) 3 valent vaccine 09/13/18     Influenza (High Dose) 3 valent vaccine 09/25/17     Influenza (High Dose) 3 valent vaccine 09/01/16     Influenza (High Dose) 3 valent vaccine 09/20/14     Influenza (IIV3) PF 10/10/15     Influenza (IIV3) PF 09/22/09     Pneumo Conj 13-V (2010&after) 06/21/16     Pneumococcal 23 valent 09/22/14     TD (ADULT, 7+) 07/09/07     TDAP Vaccine (Adacel) 10/06/16     Zoster vaccine, live 01/01/13          END      ASSESSMENT     Discharge Profile Flowsheet     EXPECTED DISCHARGE     Patient's primary language  English 11/27/18 0738    Expected Discharge Date  11/30/18 (home w/ Home PT) 11/30/18 0817   FINAL RESOURCES      DISCHARGE NEEDS ASSESSMENT     Resources List  Skilled Nursing Facility 12/05/18 1049    Concerns To Be Addressed  all concerns addressed in this encounter 05/27/18 1215   Skilled Nursing Facility  [facility] ([facility]) [phone], Fax:[phone] 12/05/18 1049    Equipment Currently Used at Home  cane, straight;wheelchair, manual;walker, rolling;shower  chair;grab bar 11/28/18 1433   PAS Number  [id number] 12/05/18 1049    Transportation Available  car;family or friend will provide 11/28/18 1433   SKIN      # of Referrals Placed by CTS  Internal Clinic Care Coordination;MTM;Specialty Providers;Scheduled Follow-up appointments 07/25/18 1555   Inspection of bony prominences  Full 12/05/18 1102    Does Patient Need a Referral for Clinic CC  Yes 04/12/18 1009   Procedural focused assessment (identify areas inspected)   Buttock, left;Buttock, right;Sacrum;Coccyx 11/28/18 0937    [facility] Name  [facility] 07/25/18 1554   Skin WDL  ex 12/05/18 1102    Primary Care MD Name  Dr Hardy 07/25/18 1554   Skin Color/Characteristics  bruised (ecchymotic) 12/05/18 1055    Equipment Used at Home  -- (w/c, SeC and WW in [place]- has reacher) 09/21/14 1005   Skin Temperature  warm 12/05/18 1055    GASTROINTESTINAL (ADULT,PEDIATRIC,OB)     Skin Moisture  flaky;dry 12/05/18 1055    GI WDL  ex 12/05/18 1055   Skin Elasticity  quick return to original state 12/05/18 1055    Abdominal Appearance  distended;rounded 12/05/18 1102   Skin Integrity  incision(s);bruise(s) 12/05/18 1102    All Quadrants Bowel Sounds  hypoactive 12/05/18 1055   Full except areas not inspected   Hip, left;Hip, right;Buttock, left;Buttock, right;Sacrum;Coccyx 12/03/18 1709    Last Bowel Movement  12/04/18 12/05/18 1102   Inspection under devices  Full 12/05/18 1102    GI Signs/Symptoms  fecal incontinence 12/03/18 0958   SAFETY      Passing flatus  yes 12/05/18 1055   Safety WDL  WDL 12/05/18 1055    COMMUNICATION ASSESSMENT     All Alarms  alarm(s) activated and audible 12/05/18 1055    Patient's communication style  spoken language (English or Bilingual) 11/27/18 0738   Safety Factors  bed in low position;call light in reach;ID band on 12/05/18 1055                 Assessment WDL (Within Defined Limits) Definitions           Safety WDL     Effective: 09/28/15    Row Information: <b>WDL Definition:</b>  "Bed in low position, wheels locked; call light in reach; upper side rails up x 2; ID band on<br> <font color=\"gray\"><i>Item=AS safety wdl>>List=AS safety wdl>>Version=F14</i></font>      Skin WDL     Effective: 09/28/15    Row Information: <b>WDL Definition:</b> Warm; dry; intact; elastic; without discoloration; pressure points without redness<br> <font color=\"gray\"><i>Item=AS skin wdl>>List=AS skin wdl>>Version=F14</i></font>      Vitals     Vital Signs Flowsheet     VITAL SIGNS     Pain Intervention(s)  Medication (See eMAR) 12/05/18 1055    Temp  99  F (37.2  C) 12/05/18 0344   Response to Interventions  Decrease in pain 12/05/18 1055    Temp src  Oral 12/05/18 0344   ANALGESIA SIDE EFFECTS MONITORING      Resp  16 12/05/18 1055   Side Effects Monitoring: Respiratory Quality  R 12/05/18 1055    Pulse  53 12/05/18 0012   Side Effects Monitoring: Respiratory Depth  N 12/05/18 1055    Heart Rate  61 12/05/18 0344   Side Effects Monitoring: Sedation Level  1 12/05/18 1055    Pulse/Heart Rate Source  Monitor 12/05/18 0101   ESHA COMA SCALE      BP  107/58 12/05/18 0344   Best Eye Response  4-->(E4) spontaneous 12/05/18 1055    BP Location  Right arm 12/05/18 0101   Best Motor Response  6-->(M6) obeys commands 12/05/18 1055    Patient Position  Sitting 11/27/18 0738   Best Verbal Response  5-->(V5) oriented 12/05/18 1055    LYING ORTHOSTATIC BP     [place] Coma Scale Score  15 12/05/18 1055    Lying Orthostatic BP  110/63 11/30/18 1609   HEIGHT AND WEIGHT      Lying Orthostatic Pulse  57 bpm 11/30/18 1609   Height  1.88 m (6' 2\") 11/27/18 0738    SITTING ORTHOSTATIC BP     Weight  105.5 kg (232 lb 8 oz) 11/27/18 0738    Sitting Orthostatic BP  113/59 11/30/18 1609   BSA (Calculated - sq m)  2.35 11/27/18 0738    Sitting Orthostatic Pulse  58 bpm 11/30/18 1609   BMI (Calculated)  29.91 11/27/18 0738    STANDING ORTHOSTATIC BP     POSITIONING      Standing Orthostatic BP  -- (unable) 11/29/18 1657   Body Position  " independently positioning 12/05/18 1055    OXYGEN THERAPY     Head of Bed (HOB)  HOB at 30-45 degrees 12/04/18 2205    SpO2  94 % 12/05/18 0344   Positioning/Transfer Devices  pillows;in use 12/05/18 1055    O2 Device  None (Room air) 12/05/18 0344   Chair  Upright in chair 12/04/18 1531    Oxygen Delivery  1 LPM 12/02/18 1607   ECG      RESPIRATORY MONITORING     ECG Rhythm  Atrial fibrillation 11/27/18 1138    Respiratory Monitoring (EtCO2)  39 mmHg 11/27/18 1446   Ectopy  None 11/27/18 1138    Integrated Pulmonary Index (IPI)  8-9 11/27/18 1446   Lead Monitored  Lead II 11/27/18 1138    PAIN/COMFORT     Equipment  electrodes changed;telemetry batteries changed 11/30/18 1028    Patient Currently in Pain  yes 12/05/18 1055   DAILY CARE      Preferred Pain Scale  number (Numeric Rating Pain Scale) 12/05/18 1055   Activity Management  up in chair 12/05/18 1055    Patient's Stated Pain Goal  2 11/28/18 0139   Activity Assistance Provided  assistance, 2 people 12/05/18 1055    0-10 Pain Scale  6 12/05/18 1055   Assistive Device Utilized  gait belt;walker 12/04/18 1531    Pain Location  Knee 12/04/18 1319   EKG MONITORING      Pain Orientation  Right 12/04/18 1319   Cardiac Regularity  Irregular 11/30/18 0203    Pain Descriptors  Sharp;Shooting 12/05/18 1055   Cardiac Rhythm  Atrial fibrillation 11/30/18 0203    Pain Management Interventions  analgesia administered 12/05/18 1055                 Patient Lines/Drains/Airways Status    Active LINES/DRAINS/AIRWAYS     Name: Placement date: Placement time: Site: Days: Last dressing change:    Ureteral Drain/Stent Left ureter 28 fr 08/14/18   1554   Left ureter   112     Peripheral IV 11/29/18 Right 11/29/18   0845      6     Wound 11/28/18 Leg Other (comment) 11/28/18   1000   Leg   7     Incision/Surgical Site 11/27/18 Right Hip 11/27/18   1023    8             Patient Lines/Drains/Airways Status    Active PICC/CVC     None            Intake/Output Detail Report     Date  Intake         Output     Net    Shift P.O. I.V. IV Piggyback Colloid Blood Components Total Urine Emesis/NG output Blood Total       Noc 12/03/18 2300 - 12/04/18 0659 300 -- -- -- -- 300 200 -- -- 200 100    Day 12/04/18 0700 - 12/04/18 1459 -- -- -- -- -- -- 250 -- -- 250 -250    Bruna 12/04/18 1500 - 12/04/18 2259 -- -- -- -- -- -- 350 -- -- 350 -350    Noc 12/04/18 2300 - 12/05/18 0659 -- -- -- -- -- -- 200 -- -- 200 -200    Day 12/05/18 0700 - 12/05/18 1459 240 -- -- -- -- 240 100 -- -- 100 140      Last Void/BM       Most Recent Value    Urine Occurrence 1 at 12/05/2018 0930    Stool Occurrence 1 at 12/03/2018 0900      Case Management/Discharge Planning     Case Management/Discharge Planning Flowsheet     REFERRAL INFORMATION     DISCHARGE PLANNING      Arrived From  admitted as an inpatient 07/25/18 1554   Transportation Available  car;family or friend will provide 11/28/18 1433    # of Referrals Placed by CTS  Internal Clinic Care Coordination;MTM;Specialty Providers;Scheduled Follow-up appointments 07/25/18 1555   Does Patient Need a Referral for Clinic CC  Yes 04/12/18 1009    CTS Assigned to Case  Jasmyne Noel  12/05/18 1049   Equipment Used at Home  -- (w/c, SeC and WW in [facility]) 09/21/14 1005    [facility] Name  [facility] 07/25/18 1554   FINAL RESOURCES      Primary Care MD Name  Dr Hardy 07/25/18 1554   Equipment Currently Used at Home  cane, straight;wheelchair, manual;walker, rolling;shower chair;grab bar 11/28/18 1433    LIVING ENVIRONMENT     Resources List  Skilled Nursing Facility 12/05/18 1049    Lives With  spouse 11/28/18 1433   Skilled Nursing Facility  [facility] ([facility]) [phone], Fax:[phone] 12/05/18 1049    Living Arrangements  apartment 11/28/18 1433   PAS Number  [id number] 12/05/18 1049    COPING/STRESS     ABUSE RISK SCREEN      Major Change/Loss/Stressor  none 11/27/18 0761   QUESTION TO PATIENT:  Has a member of your family or a partner(now or in  the past) intimidated, hurt, manipulated, or controlled you in any way?  no 11/27/18 0736    EXPECTED DISCHARGE     QUESTION TO PATIENT: Do you feel safe going back to the place where you are living?  yes 11/27/18 0736    Expected Discharge Date  11/30/18 (home w/ Home PT) 11/30/18 0817   OBSERVATION: Is there reason to believe there has been maltreatment of a vulnerable adult (ie. Physical/Sexual/Emotional abuse, self neglect, lack of adequate food, shelter, medical care, or financial exploitation)?  no 11/27/18 0736    ASSESSMENT/CONCERNS TO BE ADDRESSED     OTHER      Concerns To Be Addressed  all concerns addressed in this encounter 05/27/18 1215   Are you depressed or being treated for depression?  No 11/27/18 0792

## 2018-11-27 NOTE — IP AVS SNAPSHOT
"[place] [address] [facility]: [phone]                                              INTERAGENCY TRANSFER FORM - PHYSICIAN ORDERS   2018                    Hospital Admission Date: 2018  GALILEA LUGO   : 1945  Sex: Male        Attending Provider: Saima Howard MD     Allergies:  Ciprofloxacin, Hmg-coa-r Inhibitors    Infection:  None   Service:  SURGERY    Ht:  1.88 m (6' 2\")   Wt:  105.5 kg (232 lb 8 oz)   Admission Wt:  105.5 kg (232 lb 8 oz)    BMI:  29.85 kg/m 2   BSA:  2.35 m 2            Patient PCP Information     Provider PCP Type    Main Hardy MD General      ED Clinical Impression     Diagnosis Description Comment Added By Time Added    Status post total replacement of right hip [Z96.641] Status post total replacement of right hip [Z96.641]  Iftikhar Santamaria PA-C 2018  6:59 AM    Chronic systolic heart failure (H) [I50.22] Chronic systolic heart failure (H) [I50.22]  Anthony Baker MD 12/3/2018 11:04 AM      Hospital Problems as of 2018              Priority Class Noted POA    S/P total hip arthroplasty Medium  2018 Yes      Non-Hospital Problems as of 2018              Priority Class Noted    Alcohol abuse Medium  2014    Alcoholic cirrhosis of liver (H) Medium  2014    Chronic kidney disease, stage III (moderate) (H) Medium  2014    Physical deconditioning Medium  2014    Prostate cancer -- S/P Radiative Seed implants in 2000 (no problems since) Medium  2017    Antiphospholipid antibody syndrome (H) Medium  2017    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 (H) Medium  2017    Chronic congestive heart failure, unspecified congestive heart failure type Medium  2017    Thrombocytopenia -- suspect related to alcohol use Medium  2017    Subdural hematoma (H) Medium  2018    Benign essential hypertension Medium  2018    Malabsorption of iron Medium  2018    " Calculi, ureter Medium  8/14/2018    Anemia Medium  Unknown    Coronary artery disease involving native coronary artery of native heart without angina pectoris Medium  Unknown    Aortic stenosis Medium  Unknown    Nonrheumatic mitral valve regurgitation Medium  Unknown    Nonrheumatic tricuspid valve regurgitation Medium  Unknown    Pulmonary hypertension (H) Medium  Unknown    Paroxysmal atrial fibrillation (H) Medium  Unknown    Ischemic cardiomyopathy Medium  Unknown    Aortic root dilatation (H) Medium  Unknown    Venous thrombosis Medium  Unknown      Code Status History     Date Active Date Inactive Code Status Order ID Comments User Context    8/14/2018  6:02 PM 8/14/2018 10:21 PM Full Code [id number]  Jose Hunter MD Inpatient    7/25/2018  2:24 PM 8/14/2018  6:02 PM Full Code [id number]  Emely Ching PA-C Outpatient    7/24/2018  6:42 PM 7/25/2018  2:24 PM Full Code [id number]  Matt Cervantes MD Inpatient    7/23/2018  6:12 PM 7/24/2018  6:42 PM Full Code [id number]  Sandoval Pat DO Inpatient    5/27/2018  3:07 PM 7/23/2018  6:12 PM Full Code [id number]  Omaira Coleman DO Outpatient    5/21/2018  8:51 PM 5/27/2018  3:07 PM Full Code [id number]  Solomon Franklin MD Inpatient    4/19/2018  3:00 PM 5/21/2018  8:51 PM Full Code [id number]  Kyle Bobby Outpatient    4/12/2018 12:49 PM 4/19/2018  3:00 PM Full Code [id number]  Coy Us MD Inpatient    4/12/2018  8:20 AM 4/12/2018 12:49 PM Full Code [id number]  Keshia Black MD Outpatient    4/7/2018  5:25 PM 4/12/2018  8:20 AM Full Code [id number]  Nidia Coon MD Inpatient    4/7/2018  5:25 PM 4/7/2018  5:25 PM Full Code [id number]  Jono Issa MD Inpatient    1/29/2018  2:10 AM 1/29/2018  4:13 PM Full Code [id number]  Von Gomez MD Inpatient    12/8/2017  9:04 PM 12/10/2017  1:39 PM Full Code [id number]  Saima Howard MD Inpatient    12/6/2017  1:59  PM 12/8/2017  9:04 PM Full Code [id number]  Saima Howard MD Inpatient    12/4/2017 10:29 AM 12/6/2017  1:59 PM Full Code [id number]  Von Gomez MD Inpatient    12/4/2017 10:28 AM 12/4/2017 10:29 AM Full Code [id number]  Lejeune, Stacey, MD Inpatient    12/3/2017  3:04 PM 12/4/2017 10:28 AM Full Code [id number]  Omaira Coleman DO Inpatient    12/1/2017  7:52 PM 12/3/2017  3:04 PM Special Code [id number] Parameters:  Nursing to determine Saima Howard MD Inpatient    11/29/2017  7:56 PM 12/1/2017  7:52 PM Full Code [id number]  Davie Phillips MD Inpatient    9/21/2014  5:31 PM 11/29/2017  7:56 PM Full Code [id number]  Davie Gonzales PA-C Outpatient    9/19/2014  1:39 PM 9/21/2014  5:31 PM Full Code [id number]  Cecelia Garcia MD Inpatient         Medication Review      START taking        Dose / Directions Comments    hydrOXYzine 10 MG tablet   Commonly known as:  ATARAX        Dose:  10 mg   Take 1 tablet (10 mg) by mouth every 6 hours as needed for itching   Quantity:  30 tablet   Refills:  0        ondansetron 4 MG ODT tab   Commonly known as:  ZOFRAN-ODT        Dose:  4 mg   Take 1 tablet (4 mg) by mouth every 6 hours as needed for nausea or vomiting   Quantity:  30 tablet   Refills:  0        senna-docusate 8.6-50 MG tablet   Commonly known as:  SENOKOT-S/PERICOLACE        Dose:  1 tablet   Take 1 tablet by mouth 2 times daily   Quantity:  30 tablet   Refills:  0          CONTINUE these medications which may have CHANGED, or have new prescriptions. If we are uncertain of the size of tablets/capsules you have at home, strength may be listed as something that might have changed.        Dose / Directions Comments    HYDROmorphone 2 MG tablet   Commonly known as:  DILAUDID   This may have changed:    - when to take this  - reasons to take this        Dose:  2 mg   Take 1 tablet (2 mg) by mouth every 4 hours as needed for breakthrough pain   Quantity:  30 tablet   Refills:  0         * torsemide 20 MG tablet   Commonly known as:  DEMADEX   This may have changed:  Another medication with the same name was added. Make sure you understand how and when to take each.        Dose:  20 mg   Take 20 mg by mouth daily   Refills:  0        * torsemide 20 MG tablet   Commonly known as:  DEMADEX   This may have changed:  You were already taking a medication with the same name, and this prescription was added. Make sure you understand how and when to take each.   Used for:  Chronic systolic heart failure (H)        Dose:  20 mg   Take 1 tablet (20 mg) by mouth daily for 3 days   Quantity:  3 tablet   Refills:  0        * Notice:  This list has 2 medication(s) that are the same as other medications prescribed for you. Read the directions carefully, and ask your doctor or other care provider to review them with you.      CONTINUE these medications which have NOT CHANGED        Dose / Directions Comments    aspirin 81 MG tablet   Commonly known as:  ASA        Dose:  81 mg   Take 81 mg by mouth daily   Refills:  0        carvedilol 3.125 MG tablet   Commonly known as:  COREG        Dose:  3.125 mg   Take 1 tablet (3.125 mg) by mouth 2 times daily (with meals)   Quantity:  180 tablet   Refills:  1        fluticasone 50 MCG/ACT nasal spray   Commonly known as:  FLONASE        Dose:  1 spray   Spray 1 spray into both nostrils daily   Refills:  0        gabapentin 600 MG tablet   Commonly known as:  NEURONTIN   Used for:  Phantom limb pain (H)        Dose:  600 mg   Take 1 tablet (600 mg) by mouth 3 times daily   Quantity:  270 tablet   Refills:  3        XARELTO PO        Dose:  20 mg   Take 20 mg by mouth daily   Refills:  0                Summary of Visit     Reason for your hospital stay       Patient admitted status post right total hip replacement       Reason for your hospital stay       Patient admitted status post right total hip replacement. 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             After Care     Activity       Your activity upon discharge: activity as tolerated       Activity - Up with assistive device           Activity - Up with nursing assistance           Additional Discharge Instructions       Patient is to slowly progress back into their activities over the next several weeks.   They are to keep the Prineo(mesh) dressing in place at all times and do not remove it.   The island dressing can be removed. You can shower but try and keep the incision site covered and do not get it wet. Do not at any point in time, submerge the incision or soak the incision.   Monitor the wound closely for any foul smelling or abnormal discharge. Any temperatures over 101.5 with chills, or any redness about the incision please contact our office immediately. If you experience any chest pains, or shortness of breath, go directly to the emergency department.   Follow up with Dr. Howard in approximately 2 weeks and call if you have any complaints between now and your follow up.   Call [phone] to schedule your follow up if you do not already have one scheduled.   Make sure to work with physical therapy regularly and the days that you do not have a physical therapy appointment, you must do the exercises on your own.   Make sure that you have someone with you when you get up and move around the house and make sure you use some form of ambulatory assistance at all times, preferably a walker.   Take aspirin for prevention of blood clots  Take dilaudid for pain control  Take senokot to help with constipation  Take atarax to help with spasms/itching/relaxation  Take zofran to help with nausea  Use a walker at all times and do not get up unless someone is helping you.   Maintain full anterolateral hip precautions at all times. Do not perform any hip abduction.       Advance Diet as Tolerated       Follow this diet upon discharge: Orders Placed This Encounter      Room  Service      Advance Diet as Tolerated: Regular Diet Adult      Diet       Diet       Follow this diet upon discharge: Orders Placed This Encounter      Advance Diet as Tolerated: Regular Diet Adult       Discharge Instructions       Patient is to slowly progress back into their activities over the next several weeks.   They are to keep the Prineo(mesh) dressing in place at all times and do not remove it.   The island dressing can be removed. You can shower but try and keep the incision site covered and do not get it wet. Do not at any point in time, submerge the incision or soak the incision.   Monitor the wound closely for any foul smelling or abnormal discharge. Any temperatures over 101.5 with chills, or any redness about the incision please contact our office immediately. If you experience any chest pains, or shortness of breath, go directly to the emergency department.   Follow up with Dr. Howard in approximately 2 weeks and call if you have any complaints between now and your follow up.   Call [phone] to schedule your follow up if you do not already have one scheduled.   Maintain hip precautions with no hip abduction until we clear you to do so.   Make sure that you have someone with you when you get up and move around the house and make sure you use some form of ambulatory assistance at all times, preferably a walker.   Take aspirin and xarelto for prevention of blood clots  Take dilaudid for pain control  Take senokot to help with constipation  Take atarax to help with spasms/itching/relaxation  Take zofran to help with nausea  Wear the compression stockings as you do at home to help control swelling.   Use a walker at all times.   Do not drive until we clear you to do so.       Fall precautions           General info for SNF       Length of Stay Estimate: Short Term Care: Estimated # of Days <30  Condition at Discharge: Improving  Level of care:skilled   Rehabilitation Potential: Good  Admission H&P  remains valid and up-to-date: Yes  Recent Chemotherapy: N/A  Use Nursing Home Standing Orders: Yes       Hip precautions       Anterolateral hip precautions with no hip abduction.       Intake and output       Every shift       Mantoux instructions       Give two-step Mantoux (PPD) Per Facility Policy Yes       Weight bearing status       WBAT in the RLE with full anterolateral hip precautions in place.             Procedures     Oxygen - Nasal cannula       2 Lpm by nasal cannula to keep O2 sats 92% or greater at night and during sleep/naps.             Referrals     Home Care PT Referral for Hospital Discharge       PT to eval and treat    Your provider has ordered home care - physical therapy. If you have not been contacted within 2 days of your discharge please call the department phone number listed on the top of this document.       Occupational Therapy Adult Consult       Evaluate and treat as clinically indicated.    Reason:  Patient with recent RTHA. Also has history of left BKA       Physical Therapy Adult Consult       Evaluate and treat as clinically indicated.    Reason:  Patient admitted status post right total hip replacement. Has history of left BKA as well.              MD face to face encounter       Documentation of Face to Face and Certification for Home Health Services    I certify that patient: Antonio Ramirez is under my care and that I, or a nurse practitioner or physician's assistant working with me, had a face-to-face encounter that meets the physician face-to-face encounter requirements with this patient on: 11/28/2018.    This encounter with the patient was in whole, or in part, for the following medical condition, which is the primary reason for home health care: patient unable to drive.    I certify that, based on my findings, the following services are medically necessary home health services: Physical Therapy.    My clinical findings support the need for the above services because:  Physical Therapy Services are needed to assess and treat the following functional impairments: patient with prosthetic left leg and recent primary right total hip replacement.  Absolutely no hip abduction at any time and make sure to maintain full anterolateral hip precautions.   Further, I certify that my clinical findings support that this patient is homebound (i.e. absences from home require considerable and taxing effort and are for medical reasons or [facility] services or infrequently or of short duration when for other reasons) because: Leaving home is medically contraindicated for the following reason(s): Other physician ordered restriction: patient unable to drive...    Based on the above findings. I certify that this patient is confined to the home and needs intermittent skilled nursing care, physical therapy and/or speech therapy.  The patient is under my care, and I have initiated the establishment of the plan of care.  This patient will be followed by a physician who will periodically review the plan of care.  Physician/Provider to provide follow up care: Main Hardy    Attending hospital physician (the Medicare certified PEC provider): Saima Howard MD and Iftikhar Santamaria PA-C  Physician Signature: See electronic signature associated with these discharge orders.  Date: 11/28/2018                  Supplies     AntiEmbolism Stockings       Bilateral below knee length.On in the morning, off at night       Pneumatic Compression Device        Bilateral calf. Remove 30 mins BID.             Follow-Up Appointment Instructions     Future Labs/Procedures    Follow Up and recommended labs and tests     Comments:    F/U MD at [facility]. CBC and BMP on 12/7/2018.  F/U Dr Patt Downs, [facility] heart in 2-3 weeks    Follow-up and recommended labs and tests      Comments:    Follow up with Dr. Howard in 2 weeks and call if there are any complaints between now and his follow up.      Follow-Up Appointment  Instructions     Follow Up and recommended labs and tests       F/U MD at [facility]. CBC and BMP on 12/7/2018.  F/U Dr Patt Dwons, [facility] heart in 2-3 weeks       Follow-up and recommended labs and tests        Follow up with Dr. Howard in 2 weeks and call if there are any complaints between now and his follow up.             Statement of Approval     Ordered          12/03/18 0958  I have reviewed and agree with all the recommendations and orders detailed in this document.  EFFECTIVE NOW     Approved and electronically signed by:  Iftikhar Santamaria PA-C           11/29/18 1036  I have reviewed and agree with all the recommendations and orders detailed in this document.  EFFECTIVE NOW     Approved and electronically signed by:  Iftikhar Santamaria PA-C           11/28/18 0706  I have reviewed and agree with all the recommendations and orders detailed in this document.  EFFECTIVE NOW     Approved and electronically signed by:  Iftikhar Santamaria PA-C           11/28/18 0706  I have reviewed and agree with all the recommendations and orders detailed in this document.     Approved and electronically signed by:  Iftikhar Santamaria PA-C

## 2018-11-27 NOTE — ANESTHESIA PREPROCEDURE EVALUATION
Anesthesia Evaluation     . Pt has had prior anesthetic. Type: General (Glidescope in past)    No history of anesthetic complications          ROS/MED HX    ENT/Pulmonary:      (-) tobacco use, asthma and COPD   Neurologic: Comment: Recent subdural hematoma s/p decompressive craniotomy - 4/2018     (-) seizures, CVA and TIA   Cardiovascular:     (+) hypertension--CAD, -past MI,CABG-stent,2007  2 Drug Eluting Stent .. Taking blood thinners Pt has received instructions: . CHF . . :. dysrhythmias a-fib, valvular problems/murmurs type: AS and MR . pulmonary hypertension,       METS/Exercise Tolerance:     Hematologic:     (+) History of blood clots pt is anticoagulated, Anemia, Other Hematologic Disorder-Cirrhosis related thrombocytopenia      Musculoskeletal:   (+) arthritis, , , -       GI/Hepatic: Comment: Alcoholic cirrhosis with ascites - 3.3 L off on 10/30/2018 via paracentesis  Denies GERD symptoms, no active treatment    (+) GERD hiatal hernia, liver disease,       Renal/Genitourinary:     (+) chronic renal disease, type: CRI,       Endo: Comment: BMI 30    (+) Obesity, .   (-) Type I DM and Type II DM   Psychiatric:     (+) psychiatric history other (comment) (Alcoholism - reports 2 vodka mixed drinks per day)      Infectious Disease:         Malignancy:   (+) Malignancy History of Prostate and Lymphoma/Leukemia          Other: Comment: Hydromorphone 2 mg intermittently, per patient, last took 2 weeks ago   (+) H/O Chronic Pain,H/O chronic opiod use ,                    Physical Exam      Airway   Mallampati: III  TM distance: >3 FB  Neck ROM: full    Dental   (+) caps    Cardiovascular   Rhythm and rate: regular      Pulmonary    breath sounds clear to auscultation                    Anesthesia Plan      History & Physical Review  History and physical reviewed and following examination; no interval change.    ASA Status:  4 .    NPO Status:  > 8 hours    Plan for General and ETT with Intravenous and Propofol  "induction. Maintenance will be Balanced.    PONV prophylaxis:  Dexamethasone or Solumedrol  Additional equipment: 2nd IV, Arterial Line and Videolaryngoscope Videolaryngoscope    Would have phenylephrine infusion available, target MAP 70-75 mmHg    Low-dose dexmedetomidine infusion       Postoperative Care  Postoperative pain management:  Multi-modal analgesia.      Consents  Anesthetic plan, risks, benefits and alternatives discussed with:  Patient.  Use of blood products discussed: Yes.   Use of blood products discussed with Patient.  Consented to blood products.  .         Procedure: Procedure(s):  RIGHT TOTAL HIP ARTHROPLASTY (DEPUY)^  Preop diagnosis: DJD RIGHT HIP    Allergies   Allergen Reactions     Ciprofloxacin Itching     Severe itching \"like ants were crawling\"     Hmg-Coa-R Inhibitors Other (See Comments)     Rhabdomyolysis occurred within a couple days     Past Medical History:   Diagnosis Date     Alcoholism (H)      Amputated left leg (H)      Anemia      Anticardiolipin antibody syndrome (H)      Antiphospholipid antibody syndrome (H)      Antiplatelet or antithrombotic long-term use      Aortic root dilatation (H)      Aortic stenosis      Arthritis      Balance problem      Coronary artery disease     CABG 2007: SVG to LAD, SVG to diagonal, SVG to OM; cardiac cath 5/17/18: thrombectomy for restenosis of stents-sent for urgent CABG, cath 5/14/2007: GRECIA x2 to LAD, [place]ia to diagonal     Gastro-oesophageal reflux disease      Heart attack (H) 2007    apparently arrested, cardiac X5     Hiatal hernia      Hypercholesterolemia      Hypertension      Ischemic cardiomyopathy      Left foot drop      Liver disease     alcoholic liver disease, alcoholic cirrohsosis, portal hypertension     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)     ?When diagnosed, patient not aware of this     Moderate mitral regurgitation      Monoclonal gammopathy      Neuropathy      Noninfectious ileitis     diverticulitis of " colon     Nonrheumatic tricuspid valve regurgitation      Paroxysmal atrial fibrillation (H)      PE (pulmonary embolism) 2006    saddle emboli 2006     Prostate cancer (H) 2000    Treated with radiation (seed implants in 2000) -- no problems since     Pulmonary hypertension (H)      Renal disease     kidney stones     Syncope      Thrombocytopenia (H)      Urethral stricture      Venous thrombosis     IVC filter and lysis procedures 2007     Past Surgical History:   Procedure Laterality Date     AMPUTATE LEG BELOW KNEE Left 04/2014    related to gangrene, started as foot ulcer related to neuropathy     AMPUTATE LEG BELOW KNEE Left 12/4/2017    Procedure: AMPUTATE LEG BELOW KNEE;  Exploration of Left Leg Below Knee Amputation Stump with Ligation of Bleeders.;  Surgeon: Leandro Arreola MD;  Location:  OR     APPENDECTOMY       APPLY WOUND VAC Left 12/6/2017    Procedure: APPLY WOUND VAC;;  Surgeon: Saima Howard MD;  Location:  [facility]     ARTHROPLASTY KNEE Right 9/19/2014    Procedure: ARTHROPLASTY KNEE;  Surgeon: Saima Howard MD;  Location:  OR     CARDIAC SURGERY      CABG     CHOLECYSTECTOMY       COLONOSCOPY       COMBINED CYSTOSCOPY, RETROGRADES, EXCHANGE STENT URETER(S) Left 7/24/2018    Procedure: COMBINED CYSTOSCOPY, RETROGRADES, EXCHANGE STENT URETER(S);  CYSTOSCOPY, BILATERAL RETROGRADES, BILATERAL URETERAL STENT EXCHANGE ;  Surgeon: Matt Cervantes MD;  Location:  OR     CRANIOTOMY Right 4/7/2018    Procedure: CRANIOTOMY;  Right Craniotomy For Subdural Hematoma;  Surgeon: Jono Issa MD;  Location:  OR     CYSTOSCOPY, DILATE URETHRA, COMBINED N/A 10/12/2016    Procedure: COMBINED CYSTOSCOPY, DILATE URETHRA;  Surgeon: Dimitry Bello MD;  Location:  OR     CYSTOSCOPY, REMOVE STENT(S), COMBINED Right 7/24/2018    Procedure: COMBINED CYSTOSCOPY, REMOVE STENT(S);;  Surgeon: Jose Hunter MD;  Location:  OR     ENDOSCOPIC STRIPPING VEIN(S)        esophagogastroduodenoscopy       EYE SURGERY      cataracts     GENITOURINARY SURGERY      Prostate Seen Implants     HERNIA REPAIR      Umbilical     INCISION AND DRAINAGE LOWER EXTREMITY, COMBINED Left 12/1/2017    Procedure: COMBINED INCISION AND DRAINAGE LOWER EXTREMITY;  INCISION AND DRAINAGE OF LEFT BELOW KNEE AMPUTATION ABSCESS, WOUND VAC PLACEMENT;  Surgeon: Saima Howard MD;  Location:  OR     IR IVC FILTER PLACEMENT       IRRIGATION AND DEBRIDEMENT LOWER EXTREMITY, COMBINED Left 12/6/2017    Procedure: COMBINED IRRIGATION AND DEBRIDEMENT LOWER EXTREMITY;  IRRIGATION AND DEBRIDEMENT OF LEFT BELOW KNEE AMPUTATION SITE WITH WOUND VAC REPLACEMENT;  Surgeon: Saima Howard MD;  Location:  [facility]     IRRIGATION AND DEBRIDEMENT LOWER EXTREMITY, COMBINED Left 12/8/2017    Procedure: COMBINED IRRIGATION AND DEBRIDEMENT LOWER EXTREMITY;  IRRIGATION AND DEBRIDEMENT OF LEFT BELOW THE KNEE AMPUTATION AND SHORTENING OF THE TIBIA WITH WOUND CLOSURE;  Surgeon: Saima Howard MD;  Location:  OR     LASER HOLMIUM LITHOTRIPSY URETER(S), INSERT STENT, COMBINED Bilateral 6/28/2018    Procedure: COMBINED CYSTOSCOPY, URETEROSCOPY, LASER HOLMIUM LITHOTRIPSY URETER(S), INSERT STENT;  CYSTOSCOPY, BILATERAL URETEROSCOPY,  HOLMIUM LASER LITHOTRIPSY, BILATERAL STENT PLACEMENT, STONE BASKETING;  Surgeon: Jose Hunter MD;  Location:  OR     LASER HOLMIUM LITHOTRIPSY URETER(S), INSERT STENT, COMBINED Left 8/14/2018    Procedure: COMBINED CYSTOSCOPY, URETEROSCOPY, LASER HOLMIUM LITHOTRIPSY URETER(S), INSERT STENT;  CYSTOSCOPY, LEFT URETEROSCOPY, LEFT LASER HOLMIUM LITHOTRIPSY URETER(S) REMOVAL BILATERAL STENTS;  Surgeon: Jose Hunter MD;  Location:  OR     ORTHOPEDIC SURGERY      (R) knee scope     ORTHOPEDIC SURGERY      total hip      ORTHOPEDIC SURGERY      elbow surgery     Social History   Substance Use Topics     Smoking status: Never Smoker     Smokeless tobacco: Never Used     Alcohol use Yes       Comment: quit 10/2015; now 3-4 Vodkas/night     Prior to Admission medications    Medication Sig Start Date End Date Taking? Authorizing Provider   aspirin 81 MG tablet Take 81 mg by mouth daily   Yes Reported, Patient   carvedilol (COREG) 3.125 MG tablet Take 1 tablet (3.125 mg) by mouth 2 times daily (with meals) 4/27/18  Yes Main Hardy MD   fluticasone (FLONASE) 50 MCG/ACT spray Spray 1 spray into both nostrils daily   Yes Reported, Patient   gabapentin (NEURONTIN) 600 MG tablet Take 1 tablet (600 mg) by mouth 3 times daily 6/27/18  Yes Main Hardy MD   HYDROmorphone HCl (DILAUDID PO) Take 2 mg by mouth every 6 hours as needed for moderate to severe pain   Yes Reported, Patient   Rivaroxaban (XARELTO PO) Take 20 mg by mouth daily   Yes Reported, Patient   torsemide (DEMADEX) 20 MG tablet Take 20 mg by mouth daily   Yes Unknown, Entered By History     Current Facility-Administered Medications Ordered in Epic   Medication Dose Route Frequency Last Rate Last Dose     ceFAZolin (ANCEF) 1 g vial to attach to  ml bag for ADULT or 50 ml bag for PEDS  1 g Intravenous See Admin Instructions         ceFAZolin (ANCEF) intermittent infusion 2 g in 100 mL dextrose PRE-MIX  2 g Intravenous Pre-Op/Pre-procedure x 1 dose         lactated ringers infusion   Intravenous Continuous         lidocaine 1 % 1 mL  1 mL Other Q1H PRN         No current [facility]-ordered outpatient prescriptions on file.       lactated ringers       Wt Readings from Last 1 Encounters:   11/08/18 104.6 kg (230 lb 11.2 oz)     Temp Readings from Last 1 Encounters:   11/08/18 36.9  C (98.5  F) (Oral)     BP Readings from Last 6 Encounters:   11/08/18 128/67   10/31/18 145/81   10/30/18 131/80   10/02/18 124/76   09/24/18 130/80   09/13/18 136/71     Pulse Readings from Last 4 Encounters:   11/08/18 58   10/30/18 55   10/02/18 (!) 48   09/24/18 62     Resp Readings from Last 1 Encounters:   10/31/18 18     SpO2 Readings from Last 1 Encounters:    11/08/18 92%     Recent Labs   Lab Test  11/27/18   0612  11/08/18   1701  10/31/18   1742   NA   --   139  140   POTASSIUM  3.7  4.6  4.1   CHLORIDE   --   103  107   CO2   --   27  24   ANIONGAP   --   9  9   GLC   --   96  61*   BUN   --   15  11   CR   --   1.20  0.92   BENNIE   --   8.7  8.4*     Recent Labs   Lab Test  11/08/18   1701  08/16/18   1407   AST  34  30   ALT  17  12   ALKPHOS  101  83   BILITOTAL  0.9  0.6     Recent Labs   Lab Test  11/08/18   1701  10/31/18   1652   WBC  3.0*  2.6*   HGB  11.5*  12.3*   PLT  102*  77*     Recent Labs   Lab Test  11/27/18   0612  07/24/18   1335   ABO  PENDING  O   RH   --   Pos     Recent Labs   Lab Test  10/30/18   1324  05/24/18   0700   12/04/17   0806   INR  1.46*  1.68*   < >  1.65*   PTT   --   37   --   37    < > = values in this interval not displayed.      Recent Labs   Lab Test  10/31/18   1742  10/31/18   1652  05/21/18   1719   TROPI  0.023  Canceled, Test credited, specimen discarded  0.027     Recent Labs   Lab Test  04/07/18   2343  04/07/18   1737   PH  7.37  7.39   PCO2  36  35   PO2  165*  161*   HCO3  21  21     No results for input(s): HCG in the last 88448 hours.  Recent Results (from the past 744 hour(s))   US Paracentesis    Narrative    ULTRASOUND PARACENTESIS 10/30/2018 2:43 PM     HISTORY: Symptomatic ascites. Alcoholic cirrhosis of liver with  ascites (H).    FINDINGS: Ultrasound was used to evaluate for the presence and best  approach for paracentesis. Written and oral informed consent was  obtained. A pause for the cause procedure to verify the correct  patient and correct procedure. The skin overlying the right lower  quadrant was prepped and draped in the usual sterile fashion. The  subcutaneous tissues were anesthetized with 10 mL of 1% lidocaine. A  catheter was advanced into the peritoneal space and 3.3 L of straw  colored fluid was drained. There were no immediate complications.  Ultrasound images were permanently stored.  Patient left the ultrasound  suite in satisfactory condition.      Impression    IMPRESSION: Technically successful paracentesis without immediate  complications.    CARMEN SERRATO MD       RECENT LABS: K 3.7  ECG: 10/2018 - atrial fibrillation at 50 bpm, RAD  ECHO: 7/2018 - Interpretation Summary     The aortic Sinus(es) of Valsalva are mildly dilated at 4.3 cm and the  ascending aorta is Borderline dilated at 3.7 cm (The upper limit of normal is  3.7cm for aortic root diameter.)  There is mild to moderate (1+ to 2+) tricuspid regurgitation. The right  ventricular systolic pressure is approximated at 71mmHg plus the right atrial  pressure is elevated, consistent with severe pulmonary hypertension.  The left ventricle is normal in size with moderately decreased left  ventricular systolic function  The visual ejection fraction is estimated at 40-45% is due to anterior,  septal, and apical wall akinesis.  Also, a flattened septum is consistent with RV pressure overload.  Diastolic function not assessed due to atrial fibrillation but the diastolic  Doppler findings (E/E' ratio = 30 and/or other parameters) suggest left  ventricular filling pressures are increased.  There is mod-severe biatrial enlargement. The left atrium is severely dilated  by volume criteria at 55.7 ml/m2. Severe LA dilation is considered > 48 ml/m2.  There is moderate (2+) mitral regurgitation.  The right ventricle is mild to moderately dilated and the right ventricular  systolic function is borderline reduced.  Severe pulmonary HTN remains. MR is similar.  Compared to the prior study dated 1-, there have been no changes.  _____________________________________________________________________________  __        Left Ventricle  The left ventricle is normal in size. There is normal left ventricular wall  thickness. The visual ejection fraction is estimated at 40-45%. Moderately  decreased left ventricular systolic function. Diastolic function  not assessed  due to atrial fibrillation. Diastolic Doppler findings (E/E' ratio and/or  other parameters) suggest left ventricular filling pressures are increased.  Flattened septum is consistent with RV pressure overload. There is anterior,  septal, and apical wall akinesis. There is no thrombus seen in the left  ventricle.     Right Ventricle  The right ventricle is mild to moderately dilated. The right ventricular  systolic function is borderline reduced.     Atria  There is mod-severe biatrial enlargement. The left atrium is severely dilated.  Left atrial enlargement is noted by volume criteria. at 55.7 ml/m2. Severe LA  dilation is considered > 48 ml/m2. The right atrium is moderately dilated.  There is no atrial shunt seen.     Mitral Valve  There is moderate mitral annular calcification. There is no evidence of mitral  valve prolapse. There is moderate (2+) mitral regurgitation. There is no  mitral valve stenosis.     Tricuspid Valve  The tricuspid valve is normal in structure and function. There is mild (1+)  tricuspid regurgitation. The right ventricular systolic pressure is  approximated at 70.9 mmHg plus the right atrial pressure. The right  ventricular systolic pressure is approximated at 71mmHg plus the right atrial  pressure. Right ventricular systolic pressure is elevated, consistent with  severe pulmonary hypertension.        Aortic Valve  The aortic valve is trileaflet. There is moderate trileaflet aortic sclerosis.  No aortic regurgitation is present. The mean AoV pressure gradient is 13.7  mmHg. The peak AoV pressure gradient is 26.7 mmHg. The calculated aortic valve  are is 1.2 cm^2. Moderate valvular aortic stenosis.     Pulmonic Valve  The pulmonic valve is not well seen, but is grossly normal. There is trace  pulmonic valvular regurgitation.     Vessels  The aortic Sinus(es) of Valsalva are mildly dilated. at 4.3 cm. (The upper  limit of normal is 3.7cm for aortic root diameter.). The  ascending aorta is  Borderline dilated. at 3.7 cm. The IVC is normal in size and reactivity with  respiration, suggesting normal central venous pressure.     Pericardium  There is no pericardial effusion. There is no pleural effusion.     Rhythm  The rhythm was atrial fibrillation with controlled ventricular rate at rest.

## 2018-11-27 NOTE — IP AVS SNAPSHOT
` `     [place] [address] [facility]: [phone]                 INTERAGENCY TRANSFER FORM - NOTES (H&P, Discharge Summary, Consults, Procedures, Therapies)   2018                    Hospital Admission Date: 2018  GALILEA RAMIREZ   : 1945  Sex: Male        Patient PCP Information     Provider PCP Type    Main Hardy MD General         History & Physicals      Interval H&P Note by Saba Solitario at 2018  9:20 AM     Author:  Saba Solitario Service:  (none) Author Type:  HUC    Filed:  2018  9:21 AM Date of Service:  2018  9:20 AM Creation Time:  2018  9:20 AM    Status:  Signed :  [place]aba Solitario [place] [place]         This note is for the purpose of making the H&P performed in the clinic within the last 30 days available in the hospital surgical encounter.[BS1.1]     Revision History        User Key Date/Time User Provider Type Action    > BS1.1 2018  9:21 AM Saba Solitario [facility] [place]          Source Note     Author:  Main Hardy MD Service:  (none) Author Type:  Physician    Filed:  2018  7:39 PM Date of Service:  2018  4:00 PM Creation Time:  2018  9:21 AM    Status:  Signed :  Main Hardy MD (Physician)                [address] [address]  [phone]  Dept: [phone]    PRE-OP EVALUATION:  Today's date: 2018    Galilea Ramirez (: 1945) presents for pre-operative evaluation assessment as requested by [KW1.1] Saima Howard MD[KW1.2].  He requires evaluation and anesthesia risk assessment prior to undergoing surgery/procedure for treatment of[KW1.1] left hip osteoarthritis[JL1.1]  .[KW1.1]    Proposed Surgery/ Procedure: RIGHT TOTAL HIP ARTHROPLASTY (DEPUY)^  Date of Surgery/ Procedure: 2018  Time of Surgery/ Procedure: 7:30am  Hospital/Surgical Facility:  OR    Primary Physician: Main Hardy  Type of Anesthesia Anticipated:  General    Patient has a Health Care Directive or Living Will:[KW1.2]  NO    1.[KW1.3] Y[JL1.1]es - Do you have a history of heart attack, stroke, stent, bypass or surgery on an artery in the head, neck, heart or legs?  2. NO - Do you ever have any pain or discomfort in your chest?  3. NO - Do you have a history of  Heart Failure?  4. NO - Are you troubled by shortness of breath when: walking on the level, up a slight hill or at night?  5. NO - Do you currently have a cold, bronchitis or other respiratory infection?  6. NO - Do you have a cough, shortness of breath or wheezing?  7. NO - Do you sometimes get pains in the calves of your legs when you walk?  8. yes - Do you or anyone in your family have previous history of blood clots?  9. NO - Do you or does anyone in your family have a serious bleeding problem such as prolonged bleeding following surgeries or cuts?  10. yes - Have you ever had problems with anemia or been told to take iron pills?  11. NO - Have you had any abnormal blood loss such as black, tarry or bloody stools, or abnormal vaginal bleeding?  12. yes - Have you ever had a blood transfusion?  13. NO - Have you or any of your relatives ever had problems with anesthesia?  14. NO - Do you have sleep apnea, excessive snoring or daytime drowsiness?  15. NO - Do you have any prosthetic heart valves?  16. yes - Do you have prosthetic joints?  17. NO - Is there any chance that you may be pregnant?      HPI:     HPI re[KW1.3]lated to upcoming procedure:[KW1.1] Sage is a 73-year-old retired  with multiple medical problems including cirrhosis with ascites from alcohol with ongoing alcohol use, status post right leg amputation, antiphospholipid antibody syndrome, history of recent subdural hematoma, history of recent hospitalizations for sepsis related to obstructing kidney stones, coronary artery disease status post coronary artery bypass grafting, ischemic cardiomyopathy, pulmonary hypertension,  moderate aortic stenosis, mild aortic root dilation, moderate mitral insufficiency, tricuspid regurgitation, chronic atrial fibrillation, history of prostate cancer, history of anemia recently evaluated by his hematologist and thought to be related to chronic kidney disease, history of lymphoma.  He has been complaining of severe, quality of life limiting left hip pain for several[JL1.1] years, but it has become unbearable over the last several months[JL1.2].  He is desperate to have his left hip replaced.  Due to various decompensation in his health status, his surgery date has had to be postponed.  This included hospitalizations for sepsis, anemia, concerns about[JL1.1] non healing skin ulcers on left leg and lymphedema.  He presents for a preoperative evaluation hoping to have surgery on 11/27/18.  He was seen in the ER on 10/31 with lethargy and hypoglycemia.  His wife states that he was not eating well for several days prior to that visit.  It was also noted that he was drinking 5-7 vodka drinks per night by the ER physician, but the patient denies this and his wife corroborates that claim.  He states he is drinking 1-2 vodka drinks several days per week.  His wife has also encouraged better food intake since ER discharge.   He did recently establish with a cardiologist at [facility] heart and his cardiac conditions were thought to be stable at the time.[JL1.2]       MEDICAL HISTORY:[KW1.1]     Patient Active Problem List    Diagnosis Date Noted     Anemia      Priority: Medium     Coronary artery disease involving native coronary artery of native heart without angina pectoris      Priority: Medium     CABG 2007: SVG to LAD, SVG to diagonal, SVG to OM; cardiac cath 5/17/18: thrombectomy for restenosis of stents-sent for urgent CABG, cath 5/14/2007: GRECIA x2 to LAD, Grecia to diagonal       Aortic stenosis      Priority: Medium     Nonrheumatic mitral valve regurgitation      Priority: Medium     Nonrheumatic tricuspid  valve regurgitation      Priority: Medium     Pulmonary hypertension (H)      Priority: Medium     Paroxysmal atrial fibrillation (H)      Priority: Medium     Ischemic cardiomyopathy      Priority: Medium     Aortic root dilatation (H)      Priority: Medium     Venous thrombosis      Priority: Medium     IVC filter and lysis procedures 2007       Calculi, ureter 08/14/2018     Priority: Medium     Malabsorption of iron 06/08/2018     Priority: Medium     Benign essential hypertension 04/27/2018     Priority: Medium     Subdural hematoma (H) 04/07/2018     Priority: Medium     Thrombocytopenia -- suspect related to alcohol use 11/29/2017     Priority: Medium     Chronic congestive heart failure, unspecified congestive heart failure type 06/30/2017     Priority: Medium     Prostate cancer -- S/P Radiative Seed implants in 2000 (no problems since) 05/24/2017     Priority: Medium     Antiphospholipid antibody syndrome (H) 05/24/2017     Priority: Medium     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 (H) 05/24/2017     Priority: Medium     Alcohol abuse 09/24/2014     Priority: Medium     Problem list name updated by automated process. Provider to review       Alcoholic cirrhosis of liver (H) 09/24/2014     Priority: Medium     Chronic kidney disease, stage III (moderate) (H) 09/24/2014     Priority: Medium     Physical deconditioning 09/24/2014     Priority: Medium      Past Medical History:   Diagnosis Date     Alcoholism (H)      Amputated left leg (H)      Anemia      Anticardiolipin antibody syndrome (H)      Antiphospholipid antibody syndrome (H)      Antiplatelet or antithrombotic long-term use      Aortic root dilatation (H)      Aortic stenosis      Arthritis      Balance problem      Coronary artery disease     CABG 2007: SVG to LAD, SVG to diagonal, SVG to OM; cardiac cath 5/17/18: thrombectomy for restenosis of stents-sent for urgent CABG, cath 5/14/2007: GRECIA x2 to LAD, Grecia to diagonal      Gastro-oesophageal reflux disease      Heart attack (H) 2007    apparently arrested, cardiac X5     Hiatal hernia      Hypercholesterolemia      Hypertension      Ischemic cardiomyopathy      Left foot drop      Liver disease     alcoholic liver disease, alcoholic cirrohsosis, portal hypertension     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)     ?When diagnosed, patient not aware of this     Moderate mitral regurgitation      Monoclonal gammopathy      Neuropathy      Noninfectious ileitis     diverticulitis of colon     Nonrheumatic tricuspid valve regurgitation      Paroxysmal atrial fibrillation (H)      PE (pulmonary embolism) 2006    saddle emboli 2006     Prostate cancer (H) 2000    Treated with radiation (seed implants in 2000) -- no problems since     Pulmonary hypertension (H)      Renal disease     kidney stones     Syncope      Thrombocytopenia (H)      Urethral stricture      Venous thrombosis     IVC filter and lysis procedures 2007     Past Surgical History:   Procedure Laterality Date     AMPUTATE LEG BELOW KNEE Left 04/2014    related to gangrene, started as foot ulcer related to neuropathy     AMPUTATE LEG BELOW KNEE Left 12/4/2017    Procedure: AMPUTATE LEG BELOW KNEE;  Exploration of Left Leg Below Knee Amputation Stump with Ligation of Bleeders.;  Surgeon: Leandro Arreola MD;  Location:  OR     APPENDECTOMY       APPLY WOUND VAC Left 12/6/2017    Procedure: APPLY WOUND VAC;;  Surgeon: Saima Howard MD;  Location:  SD     ARTHROPLASTY KNEE Right 9/19/2014    Procedure: ARTHROPLASTY KNEE;  Surgeon: Saima Howard MD;  Location:  OR     CARDIAC SURGERY      CABG     CHOLECYSTECTOMY       COLONOSCOPY       COMBINED CYSTOSCOPY, RETROGRADES, EXCHANGE STENT URETER(S) Left 7/24/2018    Procedure: COMBINED CYSTOSCOPY, RETROGRADES, EXCHANGE STENT URETER(S);  CYSTOSCOPY, BILATERAL RETROGRADES, BILATERAL URETERAL STENT EXCHANGE ;  Surgeon: Matt Cervantes MD;   Location:  OR     CRANIOTOMY Right 4/7/2018    Procedure: CRANIOTOMY;  Right Craniotomy For Subdural Hematoma;  Surgeon: Jono Issa MD;  Location:  OR     CYSTOSCOPY, DILATE URETHRA, COMBINED N/A 10/12/2016    Procedure: COMBINED CYSTOSCOPY, DILATE URETHRA;  Surgeon: Dimitry Bello MD;  Location:  OR     CYSTOSCOPY, REMOVE STENT(S), COMBINED Right 7/24/2018    Procedure: COMBINED CYSTOSCOPY, REMOVE STENT(S);;  Surgeon: Jose Hunter MD;  Location:  OR     ENDOSCOPIC STRIPPING VEIN(S)       esophagogastroduodenoscopy       EYE SURGERY      cataracts     GENITOURINARY SURGERY      Prostate Seen Implants     HERNIA REPAIR      Umbilical     INCISION AND DRAINAGE LOWER EXTREMITY, COMBINED Left 12/1/2017    Procedure: COMBINED INCISION AND DRAINAGE LOWER EXTREMITY;  INCISION AND DRAINAGE OF LEFT BELOW KNEE AMPUTATION ABSCESS, WOUND VAC PLACEMENT;  Surgeon: Saima Howard MD;  Location:  OR     IR IVC FILTER PLACEMENT       IRRIGATION AND DEBRIDEMENT LOWER EXTREMITY, COMBINED Left 12/6/2017    Procedure: COMBINED IRRIGATION AND DEBRIDEMENT LOWER EXTREMITY;  IRRIGATION AND DEBRIDEMENT OF LEFT BELOW KNEE AMPUTATION SITE WITH WOUND VAC REPLACEMENT;  Surgeon: Saima Howard MD;  Location:  SD     IRRIGATION AND DEBRIDEMENT LOWER EXTREMITY, COMBINED Left 12/8/2017    Procedure: COMBINED IRRIGATION AND DEBRIDEMENT LOWER EXTREMITY;  IRRIGATION AND DEBRIDEMENT OF LEFT BELOW THE KNEE AMPUTATION AND SHORTENING OF THE TIBIA WITH WOUND CLOSURE;  Surgeon: Saima Howard MD;  Location:  OR     LASER HOLMIUM LITHOTRIPSY URETER(S), INSERT STENT, COMBINED Bilateral 6/28/2018    Procedure: COMBINED CYSTOSCOPY, URETEROSCOPY, LASER HOLMIUM LITHOTRIPSY URETER(S), INSERT STENT;  CYSTOSCOPY, BILATERAL URETEROSCOPY,  HOLMIUM LASER LITHOTRIPSY, BILATERAL STENT PLACEMENT, STONE BASKETING;  Surgeon: Jose Hunter MD;  Location:  OR     LASER HOLMIUM LITHOTRIPSY URETER(S), INSERT STENT,  "COMBINED Left 8/14/2018    Procedure: COMBINED CYSTOSCOPY, URETEROSCOPY, LASER HOLMIUM LITHOTRIPSY URETER(S), INSERT STENT;  CYSTOSCOPY, LEFT URETEROSCOPY, LEFT LASER HOLMIUM LITHOTRIPSY URETER(S) REMOVAL BILATERAL STENTS;  Surgeon: Jose Hunter MD;  Location:  OR     ORTHOPEDIC SURGERY      (R) knee scope     ORTHOPEDIC SURGERY      total hip      ORTHOPEDIC SURGERY      elbow surgery     Current Outpatient Prescriptions   Medication Sig Dispense Refill     aspirin 81 MG tablet Take 81 mg by mouth daily       carvedilol (COREG) 3.125 MG tablet Take 1 tablet (3.125 mg) by mouth 2 times daily (with meals) 180 tablet 1     fluticasone (FLONASE) 50 MCG/ACT spray Spray 1 spray into both nostrils daily       gabapentin (NEURONTIN) 600 MG tablet Take 1 tablet (600 mg) by mouth 3 times daily (Patient taking differently: Take 600 mg by mouth 2 times daily ) 270 tablet 3     HYDROmorphone HCl (DILAUDID PO) Take 2 mg by mouth every 6 hours as needed for moderate to severe pain       Rivaroxaban (XARELTO PO) Take 20 mg by mouth daily       torsemide (DEMADEX) 20 MG tablet Take 20 mg by mouth daily[JL1.3]       OTC products:[KW1.1] None, except as noted above[KW1.3]    Allergies   Allergen Reactions     Ciprofloxacin Itching     Severe itching \"like ants were crawling\"     Hmg-Coa-R Inhibitors Other (See Comments)     Rhabdomyolysis occurred within a couple days[JL1.3]      Latex Allergy:[KW1.1] NO[KW1.3]    Social History   Substance Use Topics     Smoking status: Never Smoker     Smokeless tobacco: Never Used     Alcohol use Yes      Comment: quit 10/2015; now 3-4 Vodkas/night     History   Drug Use No[JL1.3]       REVIEW OF SYSTEMS:[KW1.1]   Constitutional, neuro, ENT, endocrine, pulmonary, cardiac, gastrointestinal, genitourinary, musculoskeletal, integument (his skin ulcers have healed completely) and psychiatric systems are negative, except as otherwise noted.[JL1.2]    EXAM:[KW1.1]   /67 (BP Location: " "Right arm, Cuff Size: Adult Large)  Pulse 58  Temp 98.5  F (36.9  C) (Oral)  Ht 6' 2\" (1.88 m)  Wt 230 lb 11.2 oz (104.6 kg)  SpO2 92%  BMI 29.62 kg/m2[JL1.3]    GENERAL APPEARANCE: healthy, alert and no distress, improved     EYES: EOMI,  PERRL     HENT: ear canals and TM's normal and nose and mouth without ulcers or lesions     NECK: no adenopathy, no asymmetry, masses, or scars and thyroid normal to palpation     RESP: lungs clear to auscultation - no rales, rhonchi or wheezes     CV: The heart has an irregularly irregular rhythm with a normal rate. Systolic murmur unchanged from previous exams.     ABDOMEN:  soft, nontender, no HSM or masses and bowel sounds normal     MS: Left leg ampugation, right leg without edema, significant improvement from previous exams     SKIN: skin ulcers seem to be completely healed      NEURO: Alert and oriented to person, place and time. Cranial nerves 2-12 appear grossly intact.   Walks with cane.  Considerable amount of grimacing and pain is observable with walking with the cane     PSYCH: mentation appears normal. and affect normal/bright.  Insight and judgement remain moderate to severely impaired (continues to use alcohol despite multiple physicians advising against any alcohol consumption)     LYMPHATICS: No cervical adenopathy    DIAGNOSTI[JL1.2]CS:[KW1.1]   EKG: on 10/31 showed atrial fibrillation rate 50 with non specific ST changes[JL1.2]     Recent Labs   Lab Test  10/31/18   1742  10/31/18   1652  10/30/18   1324  08/22/18   1527   05/24/18   0700   05/18/18   1641   HGB   --   12.3*   --   9.9*   < >  9.0*   < >  10.6*   PLT   --   77*  82*  121*   < >  68*   < >  168   INR   --    --   1.46*   --    --   1.68*   --    --    NA  140  Canceled, Test credited, specimen discarded   --   139   < >  138   < >  140   POTASSIUM  4.1  Canceled, Test credited, specimen discarded   --   4.0   < >  4.0   < >  4.3   CR  0.92  Canceled, Test credited, specimen discarded   --  "  1.62*   < >  1.57*   < >  1.00   A1C   --    --    --    --    --    --    --   4.6    < > = values in this interval not displayed.        IMPRESSION:[KW1.1]   Reason for surgery/procedure: Severe left hip osteoarthritis limiting quality of life   Diagnosis/reason for consult: preoperative evaluation[JL1.2]     The proposed surgical procedure is considered[KW1.1] INTERMEDIATE[JL1.2] risk.    REVISED CARDIAC RISK INDEX  The patient has the following serious cardiovascular risks for perioperative complications such as (MI, PE, VFib and 3  AV Block):[KW1.1]  Coronary Artery Disease (MI, positive stress test, angina, Qs on EKG)[JL1.2]  INTERPRETATION:[KW1.1] 2 risks: Class III (moderate risk - 6.6% complication rate)[JL1.2]    The patient has the following additional risks for perioperative complications:[KW1.1]  Alcohol abuse with risk of withdrawal  Significant bleeding and clotting history[JL1.2]      ICD-10-CM    1. Preop general physical exam Z01.818 Comprehensive metabolic panel     CBC with platelets   2. Primary osteoarthritis of right hip M16.11    3. Alcoholic cirrhosis of liver with ascites (H) K70.31    4. Alcohol abuse F10.10    5. Antiphospholipid antibody syndrome (H) D68.61    6. Anemia, unspecified type D64.9 JUST IN CASE   7. Chronic kidney disease, stage III (moderate) (H) N18.3    8. Thrombocytopenia -- suspect related to alcohol use D69.6    9. Coronary artery disease involving native coronary artery of native heart without angina pectoris I25.10    10. Paroxysmal atrial fibrillation (H) I48.0    11. Ischemic cardiomyopathy I25.5    12. Aortic root dilatation (H) I77.810    13. 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 (H) C83.39    14. Benign essential hypertension I10[JL1.3]      At this point, I think he can proceeds with surgery  He and his wife were counseled on the importance of absolute abstinence from alcohol between now and surgery  Also, he needs to improve on nutrition between now  and surgery, I recommend 2 cans of BOOST/ENSURE per day until surgery  He and his wife pledged that they would make sure he does so  See below for Dr. Mcintyre's recommendation re perioperative management of Xarelto  Anemia is stable and probably at the best level possible in his case going into surgery   His heart disease is thought to be stable per recent cardiology visit  Blood pressure is well controlled going into surgery  Recent Cr was normal, recheck today     We discussed that because of his multiple comorbid conditions the surgery is high risk.  He and his wife seemed to understand this.  However, they both feel that the risk is worthwhile given how poor his quality of life is living with his severe hip arthritis.   We discussed the abstinence from alcohol consumption and good nutrition between now and surgery are essential to insuring a good outcome from surgery.   I think he understood this.[JL1.2]      RECOMMENDATIONS:[KW1.1]     --Consult hospital rounder / IM to assist post-op medical management    Cardiovascular Risk  Performs 4 METs exercise without symptoms (Climb a flight of stairs) .       Anemia  Anemia and does not require treatment prior to surgery.  Monitor Hemoglobin postoperatively.      --Patient is to take all scheduled medications on the day of surgery EXCEPT for modifications listed below.    Anticoagulant or Antiplatelet Medication Use  XARELTO: Bleeding risk is at least moderate for this procedure and rivaroxaban (Xarelto) should be stopped 3 days prior to procedure according to Dr. Mcintyre.  Due to history of venous thrombosis, the Xarelto should be restarted as soon as appropriate following surgery.          APPROVAL GIVEN to proceed with proposed procedure, without further diagnostic evaluation[JL1.2]       Signed Electronically by: Main Hardy MD    Copy of this evaluation report is provided to requesting physician.    [place] Preop Guidelines  Revised Cardiac Risk  Index[KW1.1]      Revision History        User Key Date/Time User Provider Type Action    > JL1.3 2018  9:21 AM Main Hardy MD Physician Sign     JL1.2 2018  7:18 PM Main Hardy MD Physician      JL1.1 2018  5:36 PM Main Hardy MD Physician      KW1.3 2018  4:22 PM Kimberly Smart MA Medical Assistant      KW1.2 2018 10:55 AM Kimberly Smart MA Medical Assistant      KW1.1 2018  9:59 AM Kimberly Smart MA Medical Assistant                   H&P (View-Only) by Main Hardy MD at 2018  4:00 PM     Author:  Main Hardy MD Service:  (none) Author Type:  Physician    Filed:  2018  7:39 PM Date of Service:  2018  4:00 PM Creation Time:  2018  9:21 AM    Status:  Signed :  Main Hardy MD (Physician)             [address] [address]  [phone]  Dept: [phone]    PRE-OP EVALUATION:  Today's date: 2018    Antonio Ramirez (: 1945) presents for pre-operative evaluation assessment as requested by [KW1.1] Saima Howard MD[KW1.2].  He requires evaluation and anesthesia risk assessment prior to undergoing surgery/procedure for treatment of[KW1.1] left hip osteoarthritis[JL1.1]  .[KW1.1]    Proposed Surgery/ Procedure: RIGHT TOTAL HIP ARTHROPLASTY (DEPUY)^  Date of Surgery/ Procedure: 2018  Time of Surgery/ Procedure: 7:30am  Hospital/Surgical Facility:  OR    Primary Physician: Main Hardy  Type of Anesthesia Anticipated: General    Patient has a Health Care Directive or Living Will:[KW1.2]  NO    1.[KW1.3] Y[JL1.1]es - Do you have a history of heart attack, stroke, stent, bypass or surgery on an artery in the head, neck, heart or legs?  2. NO - Do you ever have any pain or discomfort in your chest?  3. NO - Do you have a history of  Heart Failure?  4. NO - Are you troubled by shortness of breath when: walking on the level, up a slight hill or at night?  5.  NO - Do you currently have a cold, bronchitis or other respiratory infection?  6. NO - Do you have a cough, shortness of breath or wheezing?  7. NO - Do you sometimes get pains in the calves of your legs when you walk?  8. yes - Do you or anyone in your family have previous history of blood clots?  9. NO - Do you or does anyone in your family have a serious bleeding problem such as prolonged bleeding following surgeries or cuts?  10. yes - Have you ever had problems with anemia or been told to take iron pills?  11. NO - Have you had any abnormal blood loss such as black, tarry or bloody stools, or abnormal vaginal bleeding?  12. yes - Have you ever had a blood transfusion?  13. NO - Have you or any of your relatives ever had problems with anesthesia?  14. NO - Do you have sleep apnea, excessive snoring or daytime drowsiness?  15. NO - Do you have any prosthetic heart valves?  16. yes - Do you have prosthetic joints?  17. NO - Is there any chance that you may be pregnant?      HPI:     HPI re[KW1.3]lated to upcoming procedure:[KW1.1] Sage is a 73-year-old retired  with multiple medical problems including cirrhosis with ascites from alcohol with ongoing alcohol use, status post right leg amputation, antiphospholipid antibody syndrome, history of recent subdural hematoma, history of recent hospitalizations for sepsis related to obstructing kidney stones, coronary artery disease status post coronary artery bypass grafting, ischemic cardiomyopathy, pulmonary hypertension, moderate aortic stenosis, mild aortic root dilation, moderate mitral insufficiency, tricuspid regurgitation, chronic atrial fibrillation, history of prostate cancer, history of anemia recently evaluated by his hematologist and thought to be related to chronic kidney disease, history of lymphoma.  He has been complaining of severe, quality of life limiting left hip pain for several[JL1.1] years, but it has become unbearable over the last  several months[JL1.2].  He is desperate to have his left hip replaced.  Due to various decompensation in his health status, his surgery date has had to be postponed.  This included hospitalizations for sepsis, anemia, concerns about[JL1.1] non healing skin ulcers on left leg and lymphedema.  He presents for a preoperative evaluation hoping to have surgery on 11/27/18.  He was seen in the ER on 10/31 with lethargy and hypoglycemia.  His wife states that he was not eating well for several days prior to that visit.  It was also noted that he was drinking 5-7 vodka drinks per night by the ER physician, but the patient denies this and his wife corroborates that claim.  He states he is drinking 1-2 vodka drinks several days per week.  His wife has also encouraged better food intake since ER discharge.   He did recently establish with a cardiologist at [facility] heart and his cardiac conditions were thought to be stable at the time.[JL1.2]       MEDICAL HISTORY:[KW1.1]     Patient Active Problem List    Diagnosis Date Noted     Anemia      Priority: Medium     Coronary artery disease involving native coronary artery of native heart without angina pectoris      Priority: Medium     CABG 2007: SVG to LAD, SVG to diagonal, SVG to OM; cardiac cath 5/17/18: thrombectomy for restenosis of stents-sent for urgent CABG, cath 5/14/2007: GRECIA x2 to LAD, Grecia to diagonal       Aortic stenosis      Priority: Medium     Nonrheumatic mitral valve regurgitation      Priority: Medium     Nonrheumatic tricuspid valve regurgitation      Priority: Medium     Pulmonary hypertension (H)      Priority: Medium     Paroxysmal atrial fibrillation (H)      Priority: Medium     Ischemic cardiomyopathy      Priority: Medium     Aortic root dilatation (H)      Priority: Medium     Venous thrombosis      Priority: Medium     IVC filter and lysis procedures 2007       Calculi, ureter 08/14/2018     Priority: Medium     Malabsorption of iron 06/08/2018      Priority: Medium     Benign essential hypertension 04/27/2018     Priority: Medium     Subdural hematoma (H) 04/07/2018     Priority: Medium     Thrombocytopenia -- suspect related to alcohol use 11/29/2017     Priority: Medium     Chronic congestive heart failure, unspecified congestive heart failure type 06/30/2017     Priority: Medium     Prostate cancer -- S/P Radiative Seed implants in 2000 (no problems since) 05/24/2017     Priority: Medium     Antiphospholipid antibody syndrome (H) 05/24/2017     Priority: Medium     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 (H) 05/24/2017     Priority: Medium     Alcohol abuse 09/24/2014     Priority: Medium     Problem list name updated by automated process. Provider to review       Alcoholic cirrhosis of liver (H) 09/24/2014     Priority: Medium     Chronic kidney disease, stage III (moderate) (H) 09/24/2014     Priority: Medium     Physical deconditioning 09/24/2014     Priority: Medium      Past Medical History:   Diagnosis Date     Alcoholism (H)      Amputated left leg (H)      Anemia      Anticardiolipin antibody syndrome (H)      Antiphospholipid antibody syndrome (H)      Antiplatelet or antithrombotic long-term use      Aortic root dilatation (H)      Aortic stenosis      Arthritis      Balance problem      Coronary artery disease     CABG 2007: SVG to LAD, SVG to diagonal, SVG to OM; cardiac cath 5/17/18: thrombectomy for restenosis of stents-sent for urgent CABG, cath 5/14/2007: GERCIA x2 to LAD, Grecia to diagonal     Gastro-oesophageal reflux disease      Heart attack (H) 2007    apparently arrested, cardiac X5     Hiatal hernia      Hypercholesterolemia      Hypertension      Ischemic cardiomyopathy      Left foot drop      Liver disease     alcoholic liver disease, alcoholic cirrohsosis, portal hypertension     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)     ?When diagnosed, patient not aware of this     Moderate mitral regurgitation      Monoclonal  gammopathy      Neuropathy      Noninfectious ileitis     diverticulitis of colon     Nonrheumatic tricuspid valve regurgitation      Paroxysmal atrial fibrillation (H)      PE (pulmonary embolism) 2006    saddle emboli 2006     Prostate cancer (H) 2000    Treated with radiation (seed implants in 2000) -- no problems since     Pulmonary hypertension (H)      Renal disease     kidney stones     Syncope      Thrombocytopenia (H)      Urethral stricture      Venous thrombosis     IVC filter and lysis procedures 2007     Past Surgical History:   Procedure Laterality Date     AMPUTATE LEG BELOW KNEE Left 04/2014    related to gangrene, started as foot ulcer related to neuropathy     AMPUTATE LEG BELOW KNEE Left 12/4/2017    Procedure: AMPUTATE LEG BELOW KNEE;  Exploration of Left Leg Below Knee Amputation Stump with Ligation of Bleeders.;  Surgeon: Leandro Arreola MD;  Location:  OR     APPENDECTOMY       APPLY WOUND VAC Left 12/6/2017    Procedure: APPLY WOUND VAC;;  Surgeon: Saima Howard MD;  [facility]     ARTHROPLASTY KNEE Right 9/19/2014    Procedure: ARTHROPLASTY KNEE;  Surgeon: Saima Howard MD;  Location:  OR     CARDIAC SURGERY      CABG     CHOLECYSTECTOMY       COLONOSCOPY       COMBINED CYSTOSCOPY, RETROGRADES, EXCHANGE STENT URETER(S) Left 7/24/2018    Procedure: COMBINED CYSTOSCOPY, RETROGRADES, EXCHANGE STENT URETER(S);  CYSTOSCOPY, BILATERAL RETROGRADES, BILATERAL URETERAL STENT EXCHANGE ;  Surgeon: Matt Cervantes MD;  Location:  OR     CRANIOTOMY Right 4/7/2018    Procedure: CRANIOTOMY;  Right Craniotomy For Subdural Hematoma;  Surgeon: Jono Issa MD;  Location:  OR     CYSTOSCOPY, DILATE URETHRA, COMBINED N/A 10/12/2016    Procedure: COMBINED CYSTOSCOPY, DILATE URETHRA;  Surgeon: Dimitry Bello MD;  Location:  OR     CYSTOSCOPY, REMOVE STENT(S), COMBINED Right 7/24/2018    Procedure: COMBINED CYSTOSCOPY, REMOVE STENT(S);;  Surgeon: Elsa  Jose Wright MD;  Location:  OR     ENDOSCOPIC STRIPPING VEIN(S)       esophagogastroduodenoscopy       EYE SURGERY      cataracts     GENITOURINARY SURGERY      Prostate Seen Implants     HERNIA REPAIR      Umbilical     INCISION AND DRAINAGE LOWER EXTREMITY, COMBINED Left 12/1/2017    Procedure: COMBINED INCISION AND DRAINAGE LOWER EXTREMITY;  INCISION AND DRAINAGE OF LEFT BELOW KNEE AMPUTATION ABSCESS, WOUND VAC PLACEMENT;  Surgeon: Saima Howard MD;  Location:  OR     IR IVC FILTER PLACEMENT       IRRIGATION AND DEBRIDEMENT LOWER EXTREMITY, COMBINED Left 12/6/2017    Procedure: COMBINED IRRIGATION AND DEBRIDEMENT LOWER EXTREMITY;  IRRIGATION AND DEBRIDEMENT OF LEFT BELOW KNEE AMPUTATION SITE WITH WOUND VAC REPLACEMENT;  Surgeon: Saima Howard MD;  [facility]     IRRIGATION AND DEBRIDEMENT LOWER EXTREMITY, COMBINED Left 12/8/2017    Procedure: COMBINED IRRIGATION AND DEBRIDEMENT LOWER EXTREMITY;  IRRIGATION AND DEBRIDEMENT OF LEFT BELOW THE KNEE AMPUTATION AND SHORTENING OF THE TIBIA WITH WOUND CLOSURE;  Surgeon: Saima Howard MD;  Location:  OR     LASER HOLMIUM LITHOTRIPSY URETER(S), INSERT STENT, COMBINED Bilateral 6/28/2018    Procedure: COMBINED CYSTOSCOPY, URETEROSCOPY, LASER HOLMIUM LITHOTRIPSY URETER(S), INSERT STENT;  CYSTOSCOPY, BILATERAL URETEROSCOPY,  HOLMIUM LASER LITHOTRIPSY, BILATERAL STENT PLACEMENT, STONE BASKETING;  Surgeon: Jose Hunter MD;  Location:  OR     LASER HOLMIUM LITHOTRIPSY URETER(S), INSERT STENT, COMBINED Left 8/14/2018    Procedure: COMBINED CYSTOSCOPY, URETEROSCOPY, LASER HOLMIUM LITHOTRIPSY URETER(S), INSERT STENT;  CYSTOSCOPY, LEFT URETEROSCOPY, LEFT LASER HOLMIUM LITHOTRIPSY URETER(S) REMOVAL BILATERAL STENTS;  Surgeon: Jsoe Hunter MD;  Location:  OR     ORTHOPEDIC SURGERY      (R) knee scope     ORTHOPEDIC SURGERY      total hip      ORTHOPEDIC SURGERY      elbow surgery     Current Outpatient Prescriptions   Medication Sig Dispense  "Refill     aspirin 81 MG tablet Take 81 mg by mouth daily       carvedilol (COREG) 3.125 MG tablet Take 1 tablet (3.125 mg) by mouth 2 times daily (with meals) 180 tablet 1     fluticasone (FLONASE) 50 MCG/ACT spray Spray 1 spray into both nostrils daily       gabapentin (NEURONTIN) 600 MG tablet Take 1 tablet (600 mg) by mouth 3 times daily (Patient taking differently: Take 600 mg by mouth 2 times daily ) 270 tablet 3     HYDROmorphone HCl (DILAUDID PO) Take 2 mg by mouth every 6 hours as needed for moderate to severe pain       Rivaroxaban (XARELTO PO) Take 20 mg by mouth daily       torsemide (DEMADEX) 20 MG tablet Take 20 mg by mouth daily[JL1.3]       OTC products:[KW1.1] None, except as noted above[KW1.3]    Allergies   Allergen Reactions     Ciprofloxacin Itching     Severe itching \"like ants were crawling\"     Hmg-Coa-R Inhibitors Other (See Comments)     Rhabdomyolysis occurred within a couple days[JL1.3]      Latex Allergy:[KW1.1] NO[KW1.3]    Social History   Substance Use Topics     Smoking status: Never Smoker     Smokeless tobacco: Never Used     Alcohol use Yes      Comment: quit 10/2015; now 3-4 Vodkas/night     History   Drug Use No[JL1.3]       REVIEW OF SYSTEMS:[KW1.1]   Constitutional, neuro, ENT, endocrine, pulmonary, cardiac, gastrointestinal, genitourinary, musculoskeletal, integument (his skin ulcers have healed completely) and psychiatric systems are negative, except as otherwise noted.[JL1.2]    EXAM:[KW1.1]   /67 (BP Location: Right arm, Cuff Size: Adult Large)  Pulse 58  Temp 98.5  F (36.9  C) (Oral)  Ht 6' 2\" (1.88 m)  Wt 230 lb 11.2 oz (104.6 kg)  SpO2 92%  BMI 29.62 kg/m2[JL1.3]    GENERAL APPEARANCE: healthy, alert and no distress, improved     EYES: EOMI,  PERRL     HENT: ear canals and TM's normal and nose and mouth without ulcers or lesions     NECK: no adenopathy, no asymmetry, masses, or scars and thyroid normal to palpation     RESP: lungs clear to auscultation - no " rales, rhonchi or wheezes     CV: The heart has an irregularly irregular rhythm with a normal rate. Systolic murmur unchanged from previous exams.     ABDOMEN:  soft, nontender, no HSM or masses and bowel sounds normal     MS: Left leg ampugation, right leg without edema, significant improvement from previous exams     SKIN: skin ulcers seem to be completely healed      NEURO: Alert and oriented to person, place and time. Cranial nerves 2-12 appear grossly intact.   Walks with cane.  Considerable amount of grimacing and pain is observable with walking with the cane     PSYCH: mentation appears normal. and affect normal/bright.  Insight and judgement remain moderate to severely impaired (continues to use alcohol despite multiple physicians advising against any alcohol consumption)     LYMPHATICS: No cervical adenopathy    DIAGNOSTI[JL1.2]CS:[KW1.1]   EKG: on 10/31 showed atrial fibrillation rate 50 with non specific ST changes[JL1.2]     Recent Labs   Lab Test  10/31/18   1742  10/31/18   1652  10/30/18   1324  08/22/18   1527   05/24/18   0700   05/18/18   1641   HGB   --   12.3*   --   9.9*   < >  9.0*   < >  10.6*   PLT   --   77*  82*  121*   < >  68*   < >  168   INR   --    --   1.46*   --    --   1.68*   --    --    NA  140  Canceled, Test credited, specimen discarded   --   139   < >  138   < >  140   POTASSIUM  4.1  Canceled, Test credited, specimen discarded   --   4.0   < >  4.0   < >  4.3   CR  0.92  Canceled, Test credited, specimen discarded   --   1.62*   < >  1.57*   < >  1.00   A1C   --    --    --    --    --    --    --   4.6    < > = values in this interval not displayed.        IMPRESSION:[KW1.1]   Reason for surgery/procedure: Severe left hip osteoarthritis limiting quality of life   Diagnosis/reason for consult: preoperative evaluation[JL1.2]     The proposed surgical procedure is considered[KW1.1] INTERMEDIATE[JL1.2] risk.    REVISED CARDIAC RISK INDEX  The patient has the following serious  cardiovascular risks for perioperative complications such as (MI, PE, VFib and 3  AV Block):[KW1.1]  Coronary Artery Disease (MI, positive stress test, angina, Qs on EKG)[JL1.2]  INTERPRETATION:[KW1.1] 2 risks: Class III (moderate risk - 6.6% complication rate)[JL1.2]    The patient has the following additional risks for perioperative complications:[KW1.1]  Alcohol abuse with risk of withdrawal  Significant bleeding and clotting history[JL1.2]      ICD-10-CM    1. Preop general physical exam Z01.818 Comprehensive metabolic panel     CBC with platelets   2. Primary osteoarthritis of right hip M16.11    3. Alcoholic cirrhosis of liver with ascites (H) K70.31    4. Alcohol abuse F10.10    5. Antiphospholipid antibody syndrome (H) D68.61    6. Anemia, unspecified type D64.9 JUST IN CASE   7. Chronic kidney disease, stage III (moderate) (H) N18.3    8. Thrombocytopenia -- suspect related to alcohol use D69.6    9. Coronary artery disease involving native coronary artery of native heart without angina pectoris I25.10    10. Paroxysmal atrial fibrillation (H) I48.0    11. Ischemic cardiomyopathy I25.5    12. Aortic root dilatation (H) I77.810    13. 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 (H) C83.39    14. Benign essential hypertension I10[JL1.3]      At this point, I think he can proceeds with surgery  He and his wife were counseled on the importance of absolute abstinence from alcohol between now and surgery  Also, he needs to improve on nutrition between now and surgery, I recommend 2 cans of BOOST/ENSURE per day until surgery  He and his wife pledged that they would make sure he does so  See below for Dr. Mcintyre's recommendation re perioperative management of Xarelto  Anemia is stable and probably at the best level possible in his case going into surgery   His heart disease is thought to be stable per recent cardiology visit  Blood pressure is well controlled going into surgery  Recent Cr was normal,  recheck today     We discussed that because of his multiple comorbid conditions the surgery is high risk.  He and his wife seemed to understand this.  However, they both feel that the risk is worthwhile given how poor his quality of life is living with his severe hip arthritis.   We discussed the abstinence from alcohol consumption and good nutrition between now and surgery are essential to insuring a good outcome from surgery.   I think he understood this.[JL1.2]      RECOMMENDATIONS:[KW1.1]     --Consult hospital rounder / IM to assist post-op medical management    Cardiovascular Risk  Performs 4 METs exercise without symptoms (Climb a flight of stairs) .       Anemia  Anemia and does not require treatment prior to surgery.  Monitor Hemoglobin postoperatively.      --Patient is to take all scheduled medications on the day of surgery EXCEPT for modifications listed below.    Anticoagulant or Antiplatelet Medication Use  XARELTO: Bleeding risk is at least moderate for this procedure and rivaroxaban (Xarelto) should be stopped 3 days prior to procedure according to Dr. Mcintyre.  Due to history of venous thrombosis, the Xarelto should be restarted as soon as appropriate following surgery.          APPROVAL GIVEN to proceed with proposed procedure, without further diagnostic evaluation[JL1.2]       Signed Electronically by: Main Hardy MD    Copy of this evaluation report is provided to requesting physician.    [place] Preop Guidelines  Revised Cardiac Risk Index[KW1.1]     Revision History        User Key Date/Time User Provider Type Action    > JL1.3 11/26/2018  9:21 AM Main Hardy MD Physician Sign     JL1.2 11/8/2018  7:18 PM Main Hardy MD Physician      JL1.1 11/8/2018  5:36 PM Main Hardy MD Physician      KW1.3 11/8/2018  4:22 PM Kimberly Smart MA Medical Assistant      KW1.2 11/8/2018 10:55 AM Kimberly Smart MA Medical Assistant      KW1.1 11/8/2018  9:59 AM Kimbrely Smart MA  Medical Assistant                      Discharge Summaries      Discharge Summaries by Iftikhar Santamaria PA-C at 12/3/2018  9:59 AM     Author:  Iftikhar Santamaria PA-C Service:  Orthopedics Author Type:  Physician Assistant - [facility]    Filed:  12/3/2018 10:01 AM Date of Service:  12/3/2018  9:59 AM Creation Time:  12/3/2018  9:58 AM    Status:  Cosign Needed :  Iftikhar Santamaria PA-C (Physician Assistant - [facility])    Cosign Required:  Yes             Orthopedic Discharge Summary   Patient: Antonio Ramirez  Admission Date: 11/27/2018  Discharge Date: 12/3/18  Date of Service: 12/3/2018  Attending Provider: Saima Howard MD  Admission Diagnosis: DJD RIGHT HIP  S/P total hip arthroplasty  Discharge Diagnosis: right total hip replacement  Procedures Performed: right total hip replacement  Complications: None apparent   History of Present Illness: see operative report for full HPI  Past Medical History:[NH1.1]   Past Medical History:   Diagnosis Date     Alcoholism (H)      Amputated left leg (H)      Anemia      Anticardiolipin antibody syndrome (H)      Antiphospholipid antibody syndrome (H)      Antiplatelet or antithrombotic long-term use      Aortic root dilatation (H)      Aortic stenosis      Arthritis      Balance problem      Coronary artery disease     CABG 2007: SVG to LAD, SVG to diagonal, SVG to OM; cardiac cath 5/17/18: thrombectomy for restenosis of stents-sent for urgent CABG, cath 5/14/2007: GRECIA x2 to LAD, Grecia to diagonal     Gastro-oesophageal reflux disease      Heart attack (H) 2007    apparently arrested, cardiac X5     Hiatal hernia      Hypercholesterolemia      Hypertension      Ischemic cardiomyopathy      Left foot drop      Liver disease     alcoholic liver disease, alcoholic cirrohsosis, portal hypertension     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)     ?When diagnosed, patient not aware of this     Moderate mitral regurgitation      Monoclonal gammopathy       Neuropathy      Noninfectious ileitis     diverticulitis of colon     Nonrheumatic tricuspid valve regurgitation      Paroxysmal atrial fibrillation (H)      PE (pulmonary embolism) 2006    saddle emboli 2006     Prostate cancer (H) 2000    Treated with radiation (seed implants in 2000) -- no problems since     Pulmonary hypertension (H)      Renal disease     kidney stones     Syncope      Thrombocytopenia (H)      Urethral stricture      Venous thrombosis     IVC filter and lysis procedures 2007     Patient Active Problem List   Diagnosis     Alcohol abuse     Alcoholic cirrhosis of liver (H)     Chronic kidney disease, stage III (moderate) (H)     Physical deconditioning     Prostate cancer -- S/P Radiative Seed implants in 2000 (no problems since)     Antiphospholipid antibody syndrome (H)     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 (H)     Chronic congestive heart failure, unspecified congestive heart failure type     Thrombocytopenia -- suspect related to alcohol use     Subdural hematoma (H)     Benign essential hypertension     Malabsorption of iron     Calculi, ureter     Anemia     Coronary artery disease involving native coronary artery of native heart without angina pectoris     Aortic stenosis     Nonrheumatic mitral valve regurgitation     Nonrheumatic tricuspid valve regurgitation     Pulmonary hypertension (H)     Paroxysmal atrial fibrillation (H)     Ischemic cardiomyopathy     Aortic root dilatation (H)     Venous thrombosis     S/P total hip arthroplasty     Past Surgical History:   Procedure Laterality Date     AMPUTATE LEG BELOW KNEE Left 04/2014    related to gangrene, started as foot ulcer related to neuropathy     AMPUTATE LEG BELOW KNEE Left 12/4/2017    Procedure: AMPUTATE LEG BELOW KNEE;  Exploration of Left Leg Below Knee Amputation Stump with Ligation of Bleeders.;  Surgeon: Leandro Arreola MD;  Location: [facility]     APPENDECTOMY       APPLY WOUND VAC Left 12/6/2017     Procedure: APPLY WOUND VAC;;  Surgeon: Saima Howard MD;  [facility]     ARTHROPLASTY KNEE Right 9/19/2014    Procedure: ARTHROPLASTY KNEE;  Surgeon: Saima Howard MD;  Location:  OR     CARDIAC SURGERY      CABG     CHOLECYSTECTOMY       COLONOSCOPY       COMBINED CYSTOSCOPY, RETROGRADES, EXCHANGE STENT URETER(S) Left 7/24/2018    Procedure: COMBINED CYSTOSCOPY, RETROGRADES, EXCHANGE STENT URETER(S);  CYSTOSCOPY, BILATERAL RETROGRADES, BILATERAL URETERAL STENT EXCHANGE ;  Surgeon: Matt Cervantes MD;  Location:  OR     CRANIOTOMY Right 4/7/2018    Procedure: CRANIOTOMY;  Right Craniotomy For Subdural Hematoma;  Surgeon: Jono Issa MD;  Location:  OR     CYSTOSCOPY, DILATE URETHRA, COMBINED N/A 10/12/2016    Procedure: COMBINED CYSTOSCOPY, DILATE URETHRA;  Surgeon: Dimitry Bello MD;  Location:  OR     CYSTOSCOPY, REMOVE STENT(S), COMBINED Right 7/24/2018    Procedure: COMBINED CYSTOSCOPY, REMOVE STENT(S);;  Surgeon: Jose Hunter MD;  Location:  OR     ENDOSCOPIC STRIPPING VEIN(S)       esophagogastroduodenoscopy       EYE SURGERY      cataracts     GENITOURINARY SURGERY      Prostate Seen Implants     HERNIA REPAIR      Umbilical     INCISION AND DRAINAGE LOWER EXTREMITY, COMBINED Left 12/1/2017    Procedure: COMBINED INCISION AND DRAINAGE LOWER EXTREMITY;  INCISION AND DRAINAGE OF LEFT BELOW KNEE AMPUTATION ABSCESS, WOUND VAC PLACEMENT;  Surgeon: Saima Howard MD;  Location:  OR     IR IVC FILTER PLACEMENT       IRRIGATION AND DEBRIDEMENT LOWER EXTREMITY, COMBINED Left 12/6/2017    Procedure: COMBINED IRRIGATION AND DEBRIDEMENT LOWER EXTREMITY;  IRRIGATION AND DEBRIDEMENT OF LEFT BELOW KNEE AMPUTATION SITE WITH WOUND VAC REPLACEMENT;  Surgeon: Saima Howard MD;  Location: [facility]     IRRIGATION AND DEBRIDEMENT LOWER EXTREMITY, COMBINED Left 12/8/2017    Procedure: COMBINED IRRIGATION AND DEBRIDEMENT LOWER EXTREMITY;  IRRIGATION AND DEBRIDEMENT  OF LEFT BELOW THE KNEE AMPUTATION AND SHORTENING OF THE TIBIA WITH WOUND CLOSURE;  Surgeon: Saima Howard MD;  Location:  OR     LASER HOLMIUM LITHOTRIPSY URETER(S), INSERT STENT, COMBINED Bilateral 6/28/2018    Procedure: COMBINED CYSTOSCOPY, URETEROSCOPY, LASER HOLMIUM LITHOTRIPSY URETER(S), INSERT STENT;  CYSTOSCOPY, BILATERAL URETEROSCOPY,  HOLMIUM LASER LITHOTRIPSY, BILATERAL STENT PLACEMENT, STONE BASKETING;  Surgeon: Jose Hunter MD;  Location:  OR     LASER HOLMIUM LITHOTRIPSY URETER(S), INSERT STENT, COMBINED Left 8/14/2018    Procedure: COMBINED CYSTOSCOPY, URETEROSCOPY, LASER HOLMIUM LITHOTRIPSY URETER(S), INSERT STENT;  CYSTOSCOPY, LEFT URETEROSCOPY, LEFT LASER HOLMIUM LITHOTRIPSY URETER(S) REMOVAL BILATERAL STENTS;  Surgeon: Jose Hunter MD;  Location:  OR     ORTHOPEDIC SURGERY      (R) knee scope     ORTHOPEDIC SURGERY      total hip      ORTHOPEDIC SURGERY      elbow surgery     Social History     Social History     Marital status:      Spouse name: N/A     Number of children: 2     Years of education: N/A     Occupational History     Retired       Social History Main Topics     Smoking status: Never Smoker     Smokeless tobacco: Never Used     Alcohol use Yes      Comment: quit 10/2015; now 3-4 Vodkas/night     Drug use: No     Sexual activity: Not Currently     Other Topics Concern     Not on file     Social History Narrative    , 2 children, retired . He does not have a living will but desires full code.  (last updated 11/29/2017)[NH1.2]      Medications on admission:[NH1.1]   Prescriptions Prior to Admission   Medication Sig Dispense Refill Last Dose     aspirin 81 MG tablet Take 81 mg by mouth daily   11/22/2018 at pm     carvedilol (COREG) 3.125 MG tablet Take 1 tablet (3.125 mg) by mouth 2 times daily (with meals) 180 tablet 1 11/27/2018 at 0500     fluticasone (FLONASE) 50 MCG/ACT spray Spray 1 spray into both nostrils daily    "11/27/2018 at 0500     gabapentin (NEURONTIN) 600 MG tablet Take 1 tablet (600 mg) by mouth 3 times daily 270 tablet 3 11/27/2018 at 0500     Rivaroxaban (XARELTO PO) Take 20 mg by mouth daily   11/22/2018 at pm     torsemide (DEMADEX) 20 MG tablet Take 20 mg by mouth daily   10/27/2018     [DISCONTINUED] HYDROmorphone HCl (DILAUDID PO) Take 2 mg by mouth every 6 hours as needed for moderate to severe pain   11/13/2018 at prn[NH1.2]     Allergies:[NH1.1]    Allergies   Allergen Reactions     Ciprofloxacin Itching     Severe itching \"like ants were crawling\"     Hmg-Coa-R Inhibitors Other (See Comments)     Rhabdomyolysis occurred within a couple days[NH1.2]       Hospital Course: Patient was admitted to Orthopedics and brought to the OR on where he underwent the above named procedure. Postoperatively he did very well with no apparent complications, and he had an uneventful hospital stay. Antibiotics were given for 24 hours after surgery. He was given Coumadin for DVT prophylaxis and his pain was well controlled with dilaudid, then transitioned to oral pain medications during his hospital stay. He progressed slowly with physical therapy and discharge to TCU was recommended. His chronic medical problems were followed by the medicine team during his hospital stay and there were no significant changes to conditions or treatment plans. No new medical problems presented during his hospital stay. Patient did have a syncopal episode. His dilaudid dose was decreased as a result and he ended up having a transfusion of 2 units of PRBC's. He responded well to these changes and hasn't had any issues since.   Consultations Obtained During Hospitalization:  1. Internal medicine for management of comorbid conditions and home medication management.  2. Physical Therapy for gait training, mobilization, range of motion and strengthening exercises  3. Occupational Therapy for activities of daily living.  4. Social Work for disposition " planning.[NH1.1]  Active Problems:    S/P total hip arthroplasty[NH1.2]    Discharge Disposition: patient improving slowly status post right total hip replacement  Discharge Diet: resume normal pre operative diet  Discharge Medications:   Current Discharge Medication List      START taking these medications    Details   hydrOXYzine (ATARAX) 10 MG tablet Take 1 tablet (10 mg) by mouth every 6 hours as needed for itching  Qty: 30 tablet, Refills: 0    Associated Diagnoses: Status post total replacement of right hip      ondansetron (ZOFRAN-ODT) 4 MG ODT tab Take 1 tablet (4 mg) by mouth every 6 hours as needed for nausea or vomiting  Qty: 30 tablet, Refills: 0    Associated Diagnoses: Status post total replacement of right hip      senna-docusate (SENOKOT-S/PERICOLACE) 8.6-50 MG tablet Take 1 tablet by mouth 2 times daily  Qty: 30 tablet, Refills: 0    Associated Diagnoses: Status post total replacement of right hip         CONTINUE these medications which have CHANGED    Details   HYDROmorphone (DILAUDID) 2 MG tablet Take 1 tablet (2 mg) by mouth every 4 hours as needed for breakthrough pain  Qty: 30 tablet, Refills: 0    Associated Diagnoses: Status post total replacement of right hip         CONTINUE these medications which have NOT CHANGED    Details   aspirin 81 MG tablet Take 81 mg by mouth daily      carvedilol (COREG) 3.125 MG tablet Take 1 tablet (3.125 mg) by mouth 2 times daily (with meals)  Qty: 180 tablet, Refills: 1      fluticasone (FLONASE) 50 MCG/ACT spray Spray 1 spray into both nostrils daily      gabapentin (NEURONTIN) 600 MG tablet Take 1 tablet (600 mg) by mouth 3 times daily  Qty: 270 tablet, Refills: 3    Associated Diagnoses: Phantom limb pain (H)      Rivaroxaban (XARELTO PO) Take 20 mg by mouth daily      torsemide (DEMADEX) 20 MG tablet Take 20 mg by mouth daily           Code Status: full code  X-rays needed at the follow up visit:   Iftikhar Santamaria PA-C  12/3/2018 9:59 AM   [facility]  [place]  [phone][NH1.1]     Revision History        User Key Date/Time User Provider Type Action    > NH1.2 12/3/2018 10:01 AM Iftikhar Santamaria PA-C Physician Assistant [place] MARROQUIN Sign     NH1.1 12/3/2018  9:58 AM Iftikhar Santamaria PA-C Physician Assistant - [facility]             Discharge Summaries by Iftikhar Santamaria PA-C at 11/28/2018  7:06 AM     Author:  Iftikhar Santamaria PA-C Service:  Orthopedics Author Type:  Physician Assistant [place] MARROQUIN    Filed:  11/29/2018 10:32 AM Date of Service:  11/28/2018  7:06 AM Creation Time:  11/28/2018  7:06 AM    Status:  Cosign Needed :  Iftikhar Santamaria PA-C (Physician Assistant [place] MARROQUIN)    Cosign Required:  Yes             Orthopedic Discharge Summary   Patient: Antonio Ramirez  Admission Date: 11/27/2018  Discharge Date: 11/29/18  Date of Service: 11/28/2018  Attending Provider: Saima Howard MD  Admission Diagnosis: DJD RIGHT HIP  S/P total hip arthroplasty  Discharge Diagnosis: right hip osteoarthritis  Procedures Performed: right total hip replacement  Complications: None apparent   History of Present Illness: see operative report for full HPI  Past Medical History:   Past Medical History:   Diagnosis Date     Alcoholism (H)      Amputated left leg (H)      Anemia      Anticardiolipin antibody syndrome (H)      Antiphospholipid antibody syndrome (H)      Antiplatelet or antithrombotic long-term use      Aortic root dilatation (H)      Aortic stenosis      Arthritis      Balance problem      Coronary artery disease     CABG 2007: SVG to LAD, SVG to diagonal, SVG to OM; cardiac cath 5/17/18: thrombectomy for restenosis of stents-sent for urgent CABG, cath 5/14/2007: GRECIA x2 to LAD, Grecia to diagonal     Gastro-oesophageal reflux disease      Heart attack (H) 2007    apparently arrested, cardiac X5     Hiatal hernia      Hypercholesterolemia      Hypertension      Ischemic cardiomyopathy      Left foot drop      Liver disease     alcoholic liver disease, alcoholic  cirrohsosis, portal hypertension     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)     ?When diagnosed, patient not aware of this     Moderate mitral regurgitation      Monoclonal gammopathy      Neuropathy      Noninfectious ileitis     diverticulitis of colon     Nonrheumatic tricuspid valve regurgitation      Paroxysmal atrial fibrillation (H)      PE (pulmonary embolism) 2006    saddle emboli 2006     Prostate cancer (H) 2000    Treated with radiation (seed implants in 2000) -- no problems since     Pulmonary hypertension (H)      Renal disease     kidney stones     Syncope      Thrombocytopenia (H)      Urethral stricture      Venous thrombosis     IVC filter and lysis procedures 2007     Patient Active Problem List   Diagnosis     Alcohol abuse     Alcoholic cirrhosis of liver (H)     Chronic kidney disease, stage III (moderate) (H)     Physical deconditioning     Prostate cancer -- S/P Radiative Seed implants in 2000 (no problems since)     Antiphospholipid antibody syndrome (H)     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 (H)     Chronic congestive heart failure, unspecified congestive heart failure type     Thrombocytopenia -- suspect related to alcohol use     Subdural hematoma (H)     Benign essential hypertension     Malabsorption of iron     Calculi, ureter     Anemia     Coronary artery disease involving native coronary artery of native heart without angina pectoris     Aortic stenosis     Nonrheumatic mitral valve regurgitation     Nonrheumatic tricuspid valve regurgitation     Pulmonary hypertension (H)     Paroxysmal atrial fibrillation (H)     Ischemic cardiomyopathy     Aortic root dilatation (H)     Venous thrombosis     S/P total hip arthroplasty     Past Surgical History:   Procedure Laterality Date     AMPUTATE LEG BELOW KNEE Left 04/2014    related to gangrene, started as foot ulcer related to neuropathy     AMPUTATE LEG BELOW KNEE Left 12/4/2017    Procedure: AMPUTATE LEG BELOW  KNEE;  Exploration of Left Leg Below Knee Amputation Stump with Ligation of Bleeders.;  Surgeon: Leandro Arreola MD;  Location:  OR     APPENDECTOMY       APPLY WOUND VAC Left 12/6/2017    Procedure: APPLY WOUND VAC;;  Surgeon: Saima Howard MD;  Location:  [facility]     ARTHROPLASTY KNEE Right 9/19/2014    Procedure: ARTHROPLASTY KNEE;  Surgeon: Saima Howard MD;  Location:  OR     CARDIAC SURGERY      CABG     CHOLECYSTECTOMY       COLONOSCOPY       COMBINED CYSTOSCOPY, RETROGRADES, EXCHANGE STENT URETER(S) Left 7/24/2018    Procedure: COMBINED CYSTOSCOPY, RETROGRADES, EXCHANGE STENT URETER(S);  CYSTOSCOPY, BILATERAL RETROGRADES, BILATERAL URETERAL STENT EXCHANGE ;  Surgeon: Matt Cervantes MD;  Location:  OR     CRANIOTOMY Right 4/7/2018    Procedure: CRANIOTOMY;  Right Craniotomy For Subdural Hematoma;  Surgeon: Jono Issa MD;  Location:  OR     CYSTOSCOPY, DILATE URETHRA, COMBINED N/A 10/12/2016    Procedure: COMBINED CYSTOSCOPY, DILATE URETHRA;  Surgeon: Dimitry Bello MD;  Location:  OR     CYSTOSCOPY, REMOVE STENT(S), COMBINED Right 7/24/2018    Procedure: COMBINED CYSTOSCOPY, REMOVE STENT(S);;  Surgeon: Jose Hunter MD;  Location:  OR     ENDOSCOPIC STRIPPING VEIN(S)       esophagogastroduodenoscopy       EYE SURGERY      cataracts     GENITOURINARY SURGERY      Prostate Seen Implants     HERNIA REPAIR      Umbilical     INCISION AND DRAINAGE LOWER EXTREMITY, COMBINED Left 12/1/2017    Procedure: COMBINED INCISION AND DRAINAGE LOWER EXTREMITY;  INCISION AND DRAINAGE OF LEFT BELOW KNEE AMPUTATION ABSCESS, WOUND VAC PLACEMENT;  Surgeon: Saima Howard MD;  Location:  OR     IR IVC FILTER PLACEMENT       IRRIGATION AND DEBRIDEMENT LOWER EXTREMITY, COMBINED Left 12/6/2017    Procedure: COMBINED IRRIGATION AND DEBRIDEMENT LOWER EXTREMITY;  IRRIGATION AND DEBRIDEMENT OF LEFT BELOW KNEE AMPUTATION SITE WITH WOUND VAC REPLACEMENT;  Surgeon: Lee  Saima STEWARD MD;  Location:  [facility]     IRRIGATION AND DEBRIDEMENT LOWER EXTREMITY, COMBINED Left 12/8/2017    Procedure: COMBINED IRRIGATION AND DEBRIDEMENT LOWER EXTREMITY;  IRRIGATION AND DEBRIDEMENT OF LEFT BELOW THE KNEE AMPUTATION AND SHORTENING OF THE TIBIA WITH WOUND CLOSURE;  Surgeon: Saima Howard MD;  Location:  OR     LASER HOLMIUM LITHOTRIPSY URETER(S), INSERT STENT, COMBINED Bilateral 6/28/2018    Procedure: COMBINED CYSTOSCOPY, URETEROSCOPY, LASER HOLMIUM LITHOTRIPSY URETER(S), INSERT STENT;  CYSTOSCOPY, BILATERAL URETEROSCOPY,  HOLMIUM LASER LITHOTRIPSY, BILATERAL STENT PLACEMENT, STONE BASKETING;  Surgeon: Jose Hunter MD;  Location:  OR     LASER HOLMIUM LITHOTRIPSY URETER(S), INSERT STENT, COMBINED Left 8/14/2018    Procedure: COMBINED CYSTOSCOPY, URETEROSCOPY, LASER HOLMIUM LITHOTRIPSY URETER(S), INSERT STENT;  CYSTOSCOPY, LEFT URETEROSCOPY, LEFT LASER HOLMIUM LITHOTRIPSY URETER(S) REMOVAL BILATERAL STENTS;  Surgeon: Jose Hunter MD;  Location:  OR     ORTHOPEDIC SURGERY      (R) knee scope     ORTHOPEDIC SURGERY      total hip      ORTHOPEDIC SURGERY      elbow surgery     Social History     Social History     Marital status:      Spouse name: N/A     Number of children: 2     Years of education: N/A     Occupational History     Retired       Social History Main Topics     Smoking status: Never Smoker     Smokeless tobacco: Never Used     Alcohol use Yes      Comment: quit 10/2015; now 3-4 Vodkas/night     Drug use: No     Sexual activity: Not Currently     Other Topics Concern     Not on file     Social History Narrative    , 2 children, retired . He does not have a living will but desires full code.  (last updated 11/29/2017)      Medications on admission:   Prescriptions Prior to Admission   Medication Sig Dispense Refill Last Dose     aspirin 81 MG tablet Take 81 mg by mouth daily   11/22/2018 at pm     carvedilol (COREG) 3.125 MG  "tablet Take 1 tablet (3.125 mg) by mouth 2 times daily (with meals) 180 tablet 1 11/27/2018 at 0500     fluticasone (FLONASE) 50 MCG/ACT spray Spray 1 spray into both nostrils daily   11/27/2018 at 0500     gabapentin (NEURONTIN) 600 MG tablet Take 1 tablet (600 mg) by mouth 3 times daily 270 tablet 3 11/27/2018 at 0500     Rivaroxaban (XARELTO PO) Take 20 mg by mouth daily   11/22/2018 at pm     torsemide (DEMADEX) 20 MG tablet Take 20 mg by mouth daily   10/27/2018     [DISCONTINUED] HYDROmorphone HCl (DILAUDID PO) Take 2 mg by mouth every 6 hours as needed for moderate to severe pain   11/13/2018 at prn     Allergies:    Allergies   Allergen Reactions     Ciprofloxacin Itching     Severe itching \"like ants were crawling\"     Hmg-Coa-R Inhibitors Other (See Comments)     Rhabdomyolysis occurred within a couple days       Hospital Course: Patient was admitted to Orthopedics and brought to the OR on where he underwent the above named procedure. Postoperatively he did very well with no apparent complications, and he had an uneventful hospital stay. Antibiotics were given for 24 hours after surgery. He was given xarelto for DVT prophylaxis and his pain was well controlled with dilaudid, then transitioned to oral pain medications during his hospital stay. He progressed well with physical therapy and discharge to home with home health was recommended. His chronic medical problems were followed by the medicine team during his hospital stay and there were no significant changes to conditions or treatment plans. No new medical problems presented during his hospital stay. Patient will not wear TRACY stockings here in the hospital but states that he has lymphedema stockings at his house that he will use once he gets home to help further prevent DVT.[NH1.1] Patient had decreased respiratory status overnight post op day one and seemed \"groggy\". His dilaudid was decreased from 2-4mg every 4 hours to 1-2 mg every 4 hours per  " Lee's instruction. He also was transfused post op day 2.[NH1.2]   Consultations Obtained During Hospitalization:  1. Internal medicine for management of comorbid conditions and home medication management.  2. Physical Therapy for gait training, mobilization, range of motion and strengthening exercises  3. Occupational Therapy for activities of daily living.  4. Social Work for disposition planning.  5. Wound care consult for management of abrasion injury to the left BKA stump  Active Problems:    S/P total hip arthroplasty    Discharge Disposition: patient improving status post right total hip replacement  Discharge Diet: patient can resume pre operative diet  Discharge Medications:   Current Discharge Medication List      START taking these medications    Details   hydrOXYzine (ATARAX) 10 MG tablet Take 1 tablet (10 mg) by mouth every 6 hours as needed for itching  Qty: 30 tablet, Refills: 0    Associated Diagnoses: Status post total replacement of right hip      ondansetron (ZOFRAN-ODT) 4 MG ODT tab Take 1 tablet (4 mg) by mouth every 6 hours as needed for nausea or vomiting  Qty: 30 tablet, Refills: 0    Associated Diagnoses: Status post total replacement of right hip      senna-docusate (SENOKOT-S/PERICOLACE) 8.6-50 MG tablet Take 1 tablet by mouth 2 times daily  Qty: 30 tablet, Refills: 0    Associated Diagnoses: Status post total replacement of right hip         CONTINUE these medications which have CHANGED    Details   HYDROmorphone (DILAUDID) 2 MG tablet Take 1-2 tablets ([NH1.1]1-2[NH1.2]mg) by mouth every 4 hours as needed for breakthrough pain  Qty: 30 tablet, Refills: 0    Associated Diagnoses: Status post total replacement of right hip         CONTINUE these medications which have NOT CHANGED    Details   aspirin 81 MG tablet Take 81 mg by mouth daily      carvedilol (COREG) 3.125 MG tablet Take 1 tablet (3.125 mg) by mouth 2 times daily (with meals)  Qty: 180 tablet, Refills: 1      fluticasone  (FLONASE) 50 MCG/ACT spray Spray 1 spray into both nostrils daily      gabapentin (NEURONTIN) 600 MG tablet Take 1 tablet (600 mg) by mouth 3 times daily  Qty: 270 tablet, Refills: 3    Associated Diagnoses: Phantom limb pain (H)      Rivaroxaban (XARELTO PO) Take 20 mg by mouth daily      torsemide (DEMADEX) 20 MG tablet Take 20 mg by mouth daily           Code Status: full code  X-rays needed at the follow up visit: AP pelvis and xtable lateral of the right hip  Iftikhar Santamaria PA-C  11/28/2018 7:06 AM   [facility] [place]  [phone][NH1.1]     Revision History        User Key Date/Time User Provider Type Action    > NH1.2 11/29/2018 10:32 AM Iftikhar Santamaria PA-C Physician Assistant [place] MARROQUIN Sign     NH1.1 11/28/2018  7:08 AM Iftikhar Santamaria PA-C Physician Assistant - C Sign                     Consult Notes      Consults by Jasmyne Noel BSW at 12/3/2018 12:27 PM     Author:  Jasmyne Noel BSW Service:  Social Work Author Type:      Filed:  12/3/2018 12:27 PM Date of Service:  12/3/2018 12:27 PM Creation Time:  12/3/2018 12:23 PM    Status:  Signed :  Jasmyne Noel BSW ()     Consult Orders:    1. Social Work IP Consult [[id number]] ordered by Shahbaz Vilchis MD at 12/02/18 0942                Consult completed. Met with pt to discuss discharge plan. Pt does not feel his is strong enough to discharge today. Pt does not feel [facility] can meet his needs. Pt stated his main concerns are not being able to walk, pain, and requiring heavy assist with transferring. SW tried to explain that all of those things can be controlled over at [facility] but pt was adamant he cannot leave today. FAHEEM explained MD has cleared him to go and pt would have option to appeal discharge if he did not agree with it. Pt stated he would call and appeal discharge. Pt's wife, Helena in room and aware of pt appealing discharge. FAHEEM updated RN, RN CC, MD.[HH1.1]       Revision History        User Key  "Date/Time User Provider Type Action    > HH1.1 12/3/2018 12:27 PM Jasmyne Noel BSW  Sign            Consults by Ya Denny RD, LD at 11/28/2018  3:12 PM     Author:  Ya Denny RD, LD Service:  Nutrition Author Type:  Registered Dietitian    Filed:  11/28/2018  3:12 PM Date of Service:  11/28/2018  3:12 PM Creation Time:  11/28/2018  3:09 PM    Status:  Signed :  Ya Denny RD, LD (Registered Dietitian)     Consult Orders:    1. Nutrition Services Adult IP Consult [[id number]] ordered by Spencer Jorgensen DO at 11/28/18 1326                NUTRITION EDUCATION      REASON FOR ASSESSMENT:  RN consult: Please discuss with pt good diet for skin health.  He has a small fissure on distal left stump    NUTRITION HISTORY:  Information obtained from the pt.  \"My wife cooked for 50 years and says now she's retired\".  Pt follows a regular diet, they drink Ensure for breakfast.    CURRENT DIET:  Regular. Good appetite.    INTERVENTIONS:    Nutrition Prescription:  High protein diet for good skin care    Implementation:      *  Nutrition Education (Content):   A)  Provided handout on Tips to Increase Protein in diet   B)  Discussed good sources - Meat/dairy/eggs      *  Nutrition Education (Application):      *  Diet Education - refer to Education Flowsheet    Goals:      *  Patient will verbalize understanding of the diet      *  All of the above goals met during the education session    Follow Up/Monitoring:      *  Provided RD contact information for future questions      *  Recommended Out-Patient Nutrition Referral, if further diet instructions are needed    Ya Denny RD  Pager [phone] (M-F)            [phone] (W/E & Hol)[CM1.1]               Revision History        User Key Date/Time User Provider Type Action    > CM1.1 11/28/2018  3:12 PM Ya Denny RD, DEYSI Registered Dietitian Sign            Consults by Saima Howard MD at 11/28/2018  7:58 AM     Author:  " "Saima Howard MD Service:  Orthopedics Author Type:  Physician    Filed:  2018  7:58 AM Date of Service:  2018  7:58 AM Creation Time:  2018  7:56 AM    Status:  Signed :  Saima Howard MD (Physician)         Antonio Ramirez  2018  POD # 1 sp jori    Admit Date:  2018      Doing well.  Normal healing wound.  No immediate surgical complications identified.  No excessive bleeding  Objective:  Blood pressure 92/54, pulse 62, temperature 97.2  F (36.2  C), temperature source Oral, resp. rate 15, height 1.88 m (6' 2\"), weight 105.5 kg (232 lb 8 oz), SpO2 100 %.    Temperatures:  Current - Temp: 97.2  F (36.2  C); Max - Temp  Av.7  F (36.5  C)  Min: 97.2  F (36.2  C)  Max: 97.9  F (36.6  C)  Pulse range: Pulse  Av  Min: 62  Max: 62  Blood pressure range: Systolic (24hrs), Av , Min:90 , Max:124   ; Diastolic (24hrs), Av, Min:52, Max:78    Exam:  CMS: intact  alert, stable, wound ok  Calf nontender b le.       Labs:  Recent Labs   Lab Test  18   0612  18   1701  10/31/18   1742   POTASSIUM  3.7  4.6  4.1     Recent Labs   Lab Test  18   1148  18   1025  18   1701   HGB  10.0*  10.4*  11.5*     Recent Labs   Lab Test  10/30/18   1324  18   0700  18   0807   INR  1.46*  1.68*  1.39*     Recent Labs   Lab Test  18   1025  18   1701  10/31/18   1652   PLT  88*  102*  77*       PLAN: Weight bearing as tolerated  Start physical therapy  Pain control measures  Cont post-op routine.[LV1.1]          Revision History        User Key Date/Time User Provider Type Action    > LV1.1 2018  7:58 AM Saima Howard MD Physician Sign            Consults by Spencer Jorgensen DO at 2018  2:55 PM     Author:  Spencer Jorgensen DO Service:  Hospitalist Author Type:  Physician    Filed:  2018  3:23 PM Date of Service:  2018  2:55 PM Creation Time:  2018  2:55 PM    Status:  Signed " :  Spencer Jorgensen DO (Physician)     Consult Orders:    1. Hospitalist IP Consult: Patient to be seen: ASAP - within 4 hours; Call back #: [phone]; Hospitalist Consult; Consultant may enter orders: Yes [[id number]] ordered by Saima Howard MD at 11/27/18 1122                [facility]    Hospitalist Consultation    Date of Admission:  11/27/2018    Assessment & Plan   Antonio Ramirez is a 73 year old male with a history of pulmonary HTN, paroxysmal a fib, 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  We were consulted for assistance with medical management[JH1.1]     Right total hip arthroplasty POD #0  Patient underwent planned right hip arthroplasty by Dr. Howard on 11/27/18.  EBL 1200 mL.    - Will defer post-op management including pain control, IVF, diet, DVT prophylaxis and disposition to primary service    Ischemic cardiomyopathy w/reduced EF.  Not decompensated   CAD s/p CABG in 2007  HTN/HLP  Paroxysmal a fib   Supposed history of antiphospholipid antibody/anticardiolpin antibody  An echocardiogram on 7/11/18 showed EF of 40-45% with anterior, septal and apical wall akinesis.  No issues currently  At this time patient sounds regular.  Was on Xarelto prior to surgery and this is being held   - Continue to monitor  - Continue PTA Coreg  - Torsemide restarted by orthopedics.  If starts to have issues with PO may need to stop  - Holding ASA   - Restart Xarelto once able per surgery    Aortic root dilation   Last echo in July also showed the aortic sinus of Valsalva mildly dilated at 4.3 cm and ascending aorta was borderline at 3.7 cm   - Follow up as planned     H/o Alcohol abuse   Possible alcoholic liver disease w/cirrhosis and ascites  Had been drinking 3-4 drinks/night but supposedly quit in 2015 tough an ETOH level from 10/31/18 was 0.10.  Patient  "carries the history of decompensated alcoholic liver cirrhosis with ascites but CT imaging of the abdomen makes no mention of cirrhosis.  He has had ultrasounds in the past that have showed ascites and has had a paracentesis as recently as 10/30/18.  Patient not forthcoming with ETOH on my evaluation   No signs of withdrawal at this time  - Continue to monitor.  If develops withdrawal symptoms will order CIWA.[JH1.2]         DVT Prophylaxis: Defer to primary service  Code Status: Full Code    Disposition: Expected discharge per primary service    Spencer Jorgensen DO    Reason for Consult   Reason for consult:[JH1.1] Medical management[JH1.2]    Primary Care Physician   [facility]    Chief Complaint[JH1.1]   Hip pain    History is obtained from the patient.  His history is limited as the patient is not forthcoming with his history and this was obtained on chart review[JH1.2]    History of Present Illness   Antonio Ramirez is a 73 year old[JH1.1] male[JH1.2] with a history of pulmonary HTN, paroxysmal a fib, 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[JH1.1]  Patient seen post op.  No issues currently.  States his pain is well controlled but rates it as a 8/10.  Patient is not forthcoming with information.  When asked about medical history he states \"isn't it your job to know.\"  At this time patient has no concerns.[JH1.2]     Past Medical History[JH1.1]    I have reviewed this patient's medical history and updated it with pertinent information if needed.[JH1.2]   Past Medical History:   Diagnosis Date     Alcoholism (H)      Amputated left leg (H)      Anemia      Anticardiolipin antibody syndrome (H)      Antiphospholipid antibody syndrome (H)      Antiplatelet or antithrombotic long-term use      Aortic root dilatation (H)      Aortic stenosis      Arthritis      Balance problem      Coronary " artery disease     CABG 2007: SVG to LAD, SVG to diagonal, SVG to OM; cardiac cath 5/17/18: thrombectomy for restenosis of stents-sent for urgent CABG, cath 5/14/2007: GRECIA x2 to LAD, Grecia to diagonal     Gastro-oesophageal reflux disease      Heart attack (H) 2007    apparently arrested, cardiac X5     Hiatal hernia      Hypercholesterolemia      Hypertension      Ischemic cardiomyopathy      Left foot drop      Liver disease     alcoholic liver disease, alcoholic cirrohsosis, portal hypertension     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)     ?When diagnosed, patient not aware of this     Moderate mitral regurgitation      Monoclonal gammopathy      Neuropathy      Noninfectious ileitis     diverticulitis of colon     Nonrheumatic tricuspid valve regurgitation      Paroxysmal atrial fibrillation (H)      PE (pulmonary embolism) 2006    saddle emboli 2006     Prostate cancer (H) 2000    Treated with radiation (seed implants in 2000) -- no problems since     Pulmonary hypertension (H)      Renal disease     kidney stones     Syncope      Thrombocytopenia (H)      Urethral stricture      Venous thrombosis     IVC filter and lysis procedures 2007[JH1.3]       Past Surgical History[JH1.1]   I have reviewed this patient's surgical history and updated it with pertinent information if needed.[JH1.2]  Past Surgical History:   Procedure Laterality Date     AMPUTATE LEG BELOW KNEE Left 04/2014    related to gangrene, started as foot ulcer related to neuropathy     AMPUTATE LEG BELOW KNEE Left 12/4/2017    Procedure: AMPUTATE LEG BELOW KNEE;  Exploration of Left Leg Below Knee Amputation Stump with Ligation of Bleeders.;  Surgeon: Leandro Arreola MD;  Location:  OR     APPENDECTOMY       APPLY WOUND VAC Left 12/6/2017    Procedure: APPLY WOUND VAC;;  Surgeon: Saima Howard MD;  Location:  SD     ARTHROPLASTY KNEE Right 9/19/2014    Procedure: ARTHROPLASTY KNEE;  Surgeon: Saima Howard MD;  Location:   OR     CARDIAC SURGERY      CABG     CHOLECYSTECTOMY       COLONOSCOPY       COMBINED CYSTOSCOPY, RETROGRADES, EXCHANGE STENT URETER(S) Left 7/24/2018    Procedure: COMBINED CYSTOSCOPY, RETROGRADES, EXCHANGE STENT URETER(S);  CYSTOSCOPY, BILATERAL RETROGRADES, BILATERAL URETERAL STENT EXCHANGE ;  Surgeon: Matt Cervantes MD;  Location:  OR     CRANIOTOMY Right 4/7/2018    Procedure: CRANIOTOMY;  Right Craniotomy For Subdural Hematoma;  Surgeon: Jono Issa MD;  Location:  OR     CYSTOSCOPY, DILATE URETHRA, COMBINED N/A 10/12/2016    Procedure: COMBINED CYSTOSCOPY, DILATE URETHRA;  Surgeon: Dimitry Bello MD;  Location:  OR     CYSTOSCOPY, REMOVE STENT(S), COMBINED Right 7/24/2018    Procedure: COMBINED CYSTOSCOPY, REMOVE STENT(S);;  Surgeon: Jose Hunter MD;  Location:  OR     ENDOSCOPIC STRIPPING VEIN(S)       esophagogastroduodenoscopy       EYE SURGERY      cataracts     GENITOURINARY SURGERY      Prostate Seen Implants     HERNIA REPAIR      Umbilical     INCISION AND DRAINAGE LOWER EXTREMITY, COMBINED Left 12/1/2017    Procedure: COMBINED INCISION AND DRAINAGE LOWER EXTREMITY;  INCISION AND DRAINAGE OF LEFT BELOW KNEE AMPUTATION ABSCESS, WOUND VAC PLACEMENT;  Surgeon: Saima Howard MD;  Location:  OR     IR IVC FILTER PLACEMENT       IRRIGATION AND DEBRIDEMENT LOWER EXTREMITY, COMBINED Left 12/6/2017    Procedure: COMBINED IRRIGATION AND DEBRIDEMENT LOWER EXTREMITY;  IRRIGATION AND DEBRIDEMENT OF LEFT BELOW KNEE AMPUTATION SITE WITH WOUND VAC REPLACEMENT;  Surgeon: Saima Howard MD;  Location:  SD     IRRIGATION AND DEBRIDEMENT LOWER EXTREMITY, COMBINED Left 12/8/2017    Procedure: COMBINED IRRIGATION AND DEBRIDEMENT LOWER EXTREMITY;  IRRIGATION AND DEBRIDEMENT OF LEFT BELOW THE KNEE AMPUTATION AND SHORTENING OF THE TIBIA WITH WOUND CLOSURE;  Surgeon: Saima Howard MD;  Location:  OR     LASER HOLMIUM LITHOTRIPSY URETER(S), INSERT STENT,  "COMBINED Bilateral 6/28/2018    Procedure: COMBINED CYSTOSCOPY, URETEROSCOPY, LASER HOLMIUM LITHOTRIPSY URETER(S), INSERT STENT;  CYSTOSCOPY, BILATERAL URETEROSCOPY,  HOLMIUM LASER LITHOTRIPSY, BILATERAL STENT PLACEMENT, STONE BASKETING;  Surgeon: Jose Hunter MD;  Location:  OR     LASER HOLMIUM LITHOTRIPSY URETER(S), INSERT STENT, COMBINED Left 8/14/2018    Procedure: COMBINED CYSTOSCOPY, URETEROSCOPY, LASER HOLMIUM LITHOTRIPSY URETER(S), INSERT STENT;  CYSTOSCOPY, LEFT URETEROSCOPY, LEFT LASER HOLMIUM LITHOTRIPSY URETER(S) REMOVAL BILATERAL STENTS;  Surgeon: Jose Hunter MD;  Location:  OR     ORTHOPEDIC SURGERY      (R) knee scope     ORTHOPEDIC SURGERY      total hip      ORTHOPEDIC SURGERY      elbow surgery[JH1.3]       Prior to Admission Medications   Prior to Admission Medications   Prescriptions Last Dose Informant Patient Reported? Taking?   HYDROmorphone HCl (DILAUDID PO) 11/13/2018 at prn Self Yes Yes   Sig: Take 2 mg by mouth every 6 hours as needed for moderate to severe pain   Rivaroxaban (XARELTO PO) 11/22/2018 at pm Self Yes Yes   Sig: Take 20 mg by mouth daily   aspirin 81 MG tablet 11/22/2018 at pm Self Yes Yes   Sig: Take 81 mg by mouth daily   carvedilol (COREG) 3.125 MG tablet 11/27/2018 at 0500 Self Yes Yes   Sig: Take 1 tablet (3.125 mg) by mouth 2 times daily (with meals)   fluticasone (FLONASE) 50 MCG/ACT spray 11/27/2018 at 0500 Self Yes Yes   Sig: Spray 1 spray into both nostrils daily   gabapentin (NEURONTIN) 600 MG tablet 11/27/2018 at 0500 Self No Yes   Sig: Take 1 tablet (600 mg) by mouth 3 times daily   torsemide (DEMADEX) 20 MG tablet 10/27/2018 Self Yes Yes   Sig: Take 20 mg by mouth daily      Facility-Administered Medications: None     Allergies   Allergies   Allergen Reactions     Ciprofloxacin Itching     Severe itching \"like ants were crawling\"     Hmg-Coa-R Inhibitors Other (See Comments)     Rhabdomyolysis occurred within a couple days       Social " History[JH1.1]   I have reviewed this patient's social history and updated it with pertinent information if needed. Antonio Ramirez[JH1.2]  reports that he has never smoked. He has never used smokeless tobacco. He reports that he drinks alcohol. He reports that he does not use illicit drugs.[JH1.3]    Family History[JH1.1]   I have reviewed this patient's family history and updated it with pertinent information if needed.[JH1.2]   Family History   Problem Relation Age of Onset     Lung Cancer Mother      Heart Failure Father       age 87[JH1.3]       Review of Systems[JH1.1]   The 10 point Review of Systems is negative other than noted in the HPI[JH1.2]    Physical Exam   Temp: 97.8  F (36.6  C) Temp src: Temporal BP: 114/65   Heart Rate: 58 Resp: 12 SpO2: 96 % O2 Device: Nasal cannula Oxygen Delivery: 4 LPM  Vital Signs with Ranges  Temp:  [97.7  F (36.5  C)-97.9  F (36.6  C)] 97.8  F (36.6  C)  Heart Rate:  [57-66] 58  Resp:  [10-18] 12  BP: ()/(52-78) 114/65  MAP:  [61 mmHg-72 mmHg] 72 mmHg  Arterial Line BP: ()/(46-54) 110/53  SpO2:  [94 %-99 %] 96 %  232 lbs 8 oz    onstitutional:[JH1.1] Appears older than stated age.[JH1.2]  Awake, alert, no apparent distress  Eyes: Conjunctiva and pupils examined and normal  HEENT: Moist mucous membranes, normal dentition  Respiratory: Clear to auscultation bilaterally, no crackles or wheezing  Cardiovascular: Regular rate and rhythm, normal S1 and S2, and[JH1.1] 3/6 systolic[JH1.2] murmur noted  GI: Soft, non-distended, non-tender, normal bowel sounds  Lymph/Hematologic: No anterior cervical or supraclavicular adenopathy  Skin: No rashes, no cyanosis, no edema  Musculoskeletal:[JH1.1] Left BKA[JH1.2]   Neurologic:[JH1.1]  No focal deficits[JH1.2]   Psychiatric: Alert, no obvious anxiety or depression.    Data   -Data reviewed today: All pertinent laboratory and imaging results from this encounter were reviewed. I personally reviewed[JH1.1] no images or EKG's  today[JH1.2].    Recent Labs  Lab 11/27/18  1148 11/27/18  1025 11/27/18  0612   WBC  --  4.7  --    HGB 10.0* 10.4*  --    MCV  --  96  --    PLT  --  88*  --    POTASSIUM  --   --  3.7       Imaging:  Recent Results (from the past 24 hour(s))   XR Pelvis Port 1/2 Views    Narrative    PELVIS ONE TO TWO VIEW RIGHT November 27, 2018 10:05 AM     HISTORY: Intraoperative pelvis film.    COMPARISON: None.      Impression    IMPRESSION: Right hip arthroplasty spacer in anatomic alignment. No  acute osseous abnormality demonstrated.    CARMEN SERRATO MD[JH1.1]               Revision History        User Key Date/Time User Provider Type Action    > JH1.3 11/27/2018  3:23 PM Spencer Jorgensen, DO Physician Sign     JH1.2 11/27/2018  2:58 PM Spencer Jorgensen, DO Physician      JH1.1 11/27/2018  2:55 PM Spencer Jorgensen DO Physician                      Progress Notes - Physician (Notes from 12/02/18 through 12/05/18)      Progress Notes by Jasmyne Noel BSW at 12/5/2018 10:50 AM     Author:  Jasmyne Noel BSW Service:  Social Work Author Type:      Filed:  12/5/2018 10:51 AM Date of Service:  12/5/2018 10:50 AM Creation Time:  12/5/2018 10:50 AM    Status:  Signed :  Jasmyne Noel BSW ()         FAHEEM  D: Received discharge orders for pt. Pt has been accepted at [facility] for admission. Pt and spouse, Helena are in agreement for pt to discharge. Transport aide will bring pt over to [facility] at 1300. Faxed orders.    PAS-RR    D: Per DHS regulation, FAHEEM completed and submitted PAS-RR to [place] [employer] on Aging Direct Connect via the Senior LinkAge Line.  PAS-RR confirmation # is : [id number]      I: FAHEEM spoke with pt and they are aware a PAS-RR has been submitted.  FAHEEM reviewed with pt that they may be contacted for a follow up appointment within 10 days of hospital discharge if their SNF stay is < 30 days.  Contact information for [facility] LinkAge Line was also provided.    A: Pt  verbalized understanding.    P: Further questions may be directed to Senior LinkAge Line at #[phone], option #4 for PAS-RR staff.    Jasmyne Noel MSW, LGSW[HH1.1]      Revision History        User Key Date/Time User Provider Type Action    > HH1.1 12/5/2018 10:51 AM Jasmyne Noel, BSW  Sign            Progress Notes by Solomon Tucker MD at 12/4/2018  4:17 PM     Author:  Solomon Tucker MD Service:  Hospitalist Author Type:  Physician    Filed:  12/4/2018  4:24 PM Date of Service:  12/4/2018  4:17 PM Creation Time:  12/4/2018  4:17 PM    Status:  Signed :  Solomon Tucker MD (Physician)         [facility]  Hospitalist Progress Note  Solomon Tucker MD  12/04/2018    Assessment & Plan   Antonio Ramirez is a 73 year old male with a history of pulmonary HTN, paroxysmal a fib, 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  Hospitalist consulted for assistance with medical management       Right total hip arthroplasty POD # 7:  -prolonged post op course due to complications below  -Patient underwent planned right hip arthroplasty by Dr. Howard on 11/27/18.  EBL 1200 mL.    -Routine postop care per orthopedic surgery.     Near syncope/syncope:  -Patient reportedly syncopized for several seconds on 11/29 although he downplayed that episode  -Per nursing report he needed a sternal rub to get a response  -Brief episode of syncope was likely multifactorial, in the postop setting, volume loss and blood loss anemia with valvular issues and severe pulmonary hypertension  -Continue to monitor on telemetry  -Repeat echo-not significantly changed from prior, appears like the wall motion abnormality mentioned on current echo was present on echo from 7/2018.  EF marginally decreased from 40-45% to 35-40%.  Moderate aortic stenosis  -Patient had further  one episode of near syncope yesterday on 12/30; again this is likely multifactorial, appears to be volume down, multiple valvular problems and pulmonary hypertension.    -No further near syncope or syncope since.     Acute kidney injury on chronic kidney disease stage III  -He is to have a baseline chronic kidney disease although his creatinine has been fluctuating lately, was normal on October 2018.  -Creatinine worsened from 1.59 to 2.09, on 12/1 now improving, creatinine 1.29 to 1.23 today  -Continue diuretics.  Please see discussion below.   - Recommend rechecking BMP on 12/7 at TCU if he discharges today.     Acute postop on chronic anemia:  -Baseline hemoglobin appears to be between 10.5-12,   -Received 1 additional unit of PRBC yesterday(2 since surgery)  -Hemoglobin stable at 8.1 on 12/2, will repeat if she is here  -repeat stat hgb  - will give venofer today and tomorrow.      Ischemic cardiomyopathy w/reduced EF.   CAD s/p CABG in 2007  HTN/HLP  Paroxysmal a-fib   Supposed history of antiphospholipid antibody/anticardiolpin antibody  An echocardiogram on 7/11/18 showed EF of 40-45% with anterior, septal and apical wall akinesis. Severe pulmonary hypertension, moderate aortic stenosis, moderate mitral regurgitation.  Repeat echo as described above  -Blood pressures have been borderline, hold Coreg   -Torsemide restarted 12/2.  Still has significant right lower extremity edema.  Will check venous Dopplers to rule out DVT.  -recommended compression stockings if venous Doppler is negative  -Denies dyspnea, not hypoxic  -Recommend additional doses of torsemide 20 mg at 4 PM for 3 days, further dosing of torsemide to be determined based on BMP and lower extremity edema on Friday.  Can be addressed by MD at TCU  -Outpatient follow-up with [facility] heart in 2-3 weeks.  -Holding ASA, restart once okay with ortho  -Xarelto restarted 11/28  -Overnight oximetry done 2 nights ago was positive, recommend nighttime oxygen at  "sleep and during daytime naps.  Likely will need sleep study as outpatient.  Defer to PCP.      Aortic root dilation   Last echo in July also showed the aortic sinus of Valsalva mildly dilated at 4.3 cm and ascending aorta was borderline at 3.7 cm   -Follow up as planned       H/o Alcohol abuse   Possible alcoholic liver disease w/cirrhosis and ascites  Had been drinking 3-4 drinks/night but supposedly quit in 2015 tough an ETOH level from 10/31/18 was 0.10.  Patient carries the history of decompensated alcoholic liver cirrhosis with ascites but CT imaging of the abdomen makes no mention of cirrhosis.  He has had ultrasounds in the past that have showed ascites and has had a paracentesis as recently as 10/30/18.   -no evidence of withdrawal at this time.       Generalized deconditioning:  -Appears to be weak overall, multifactorial  -Physical therapy recommending TCU     D/W: RN    DVT Prophylaxis: xarelto    Code Status: Full Code     Disposition:   To TCU tomorrow    Interval History   -- chart reviewed      -Data reviewed today: I reviewed all new labs and imaging over the last 24 hours. I personally reviewed no images or EKG's today.    Physical Exam   Heart Rate: 54, Blood pressure 103/57, pulse 63, temperature 97.9  F (36.6  C), temperature source Oral, resp. rate 16, height 1.88 m (6' 2\"), weight 105.5 kg (232 lb 8 oz), SpO2 97 %.  Vitals:    11/27/18 0737   Weight: 105.5 kg (232 lb 8 oz)     Vital Signs with Ranges  Temp:  [97.7  F (36.5  C)-99.2  F (37.3  C)] 97.9  F (36.6  C)  Pulse:  [63-71] 63  Heart Rate:  [54-80] 54  Resp:  [16] 16  BP: ()/(51-60) 103/57  SpO2:  [90 %-97 %] 97 %  I/O's Last 24 hours  I/O last 3 completed shifts:  In: 500 [P.O.:500]  Out: 1375 [Urine:1375]    Constitutional: Awake, alert, cooperative, no apparent distress  Respiratory: Clear to auscultation bilaterally, no crackles or wheezing  Cardiovascular: Regular rate and rhythm, normal S1 and S2, and no murmur noted  GI: " Normal bowel sounds, soft, non-distended, non-tender  Skin/Integumen: No rashes, no cyanosis, no edema  Other:      Medications   All medications were reviewed.      fluticasone  1 spray Both Nostrils Daily     folic acid  1 mg Oral Daily     gabapentin  600 mg Oral BID     multivitamin w/minerals  1 tablet Oral Daily     rivaroxaban ANTICOAGULANT  20 mg Oral Daily with supper     senna-docusate  1 tablet Oral BID    Or     senna-docusate  2 tablet Oral BID     sodium chloride (PF)  3 mL Intracatheter Q8H     torsemide  20 mg Oral Daily     torsemide  20 mg Oral Daily at 4 pm     vitamin B1  100 mg Oral Daily        Data     Recent Labs  Lab 12/04/18  0744 12/03/18  0714 12/02/18  0652 12/01/18  0622 11/30/18  1555 11/30/18  0724 11/29/18  0714   WBC  --   --  6.2 6.2  --  6.2  --    HGB  --   --  8.1* 7.1* 7.3* 7.9* 7.1*   MCV  --   --  92 93  --  96  --    PLT  --   --  112* 94*  --  101* 91*   NA  --   --  133 132*  --  133 133   POTASSIUM  --   --  4.5 4.7  --  4.9 4.8   CHLORIDE  --   --  102 102  --  102 101   CO2  --   --  26 26  --  25 28   BUN  --   --  36* 35*  --  29 21   CR 1.23 1.29* 1.61* 2.09*  --  2.07* 1.59*   ANIONGAP  --   --  5 4  --  6 4   BENNIE  --   --  8.5 8.5  --  8.5 8.2*   GLC  --   --  97 102*  --  88 91   ALBUMIN  --   --   --   --   --   --  3.0*   PROTTOTAL  --   --   --   --   --   --  6.4*   BILITOTAL  --   --   --   --   --   --  0.8   ALKPHOS  --   --   --   --   --   --  65   ALT  --   --   --   --   --   --  10   AST  --   --   --   --   --   --  25       No results found for this or any previous visit (from the past 24 hour(s)).    Solomon Tucker MD  Text Page  (7am to 6pm)[PD1.1]          Revision History        User Key Date/Time User Provider Type Action    > PD1.1 12/4/2018  4:24 PM Solomon Tucker MD Physician Sign            Progress Notes by Jasmyne Noel BSW at 12/4/2018  3:27 PM     Author:  Jasmyne Noel BSW Service:  Social Work Author Type:       Filed:  2018  3:52 PM Date of Service:  2018  3:27 PM Creation Time:  2018  3:27 PM    Status:  Signed :  Jasmyne Noel BSW ()         FAHEEM  D: Met with pt with RN CC to discuss discharge appeal which was lost. Pt was upset he lost. SW discussed that [facility] also does not have a bed available for pt now. FAHEEM asked pt for additional TCU choices. Pt stated he didn't have any. SW listed off some facilities and pt stated [facility] was ok. SW explained she'd send the referral out but would also put out referrals to other facilities due to pt needing to be out by 12. Pt was fine with that. FAHEEM sent out referrals via DOD.   P: Will continue to follow and support a safe discharge plan    Jasmyne JAIN, CARISSA[HH1.1]      Revision History        User Key Date/Time User Provider Type Action    > HH1.1 2018  3:52 PM Jasmyne Noel BSW  Sign            Progress Notes by Vamshi Monte RN at 2018 12:11 PM     Author:  Vamshi Monte RN Service:  [facility] Nurse Author Type:  Registered Nurse    Filed:  2018 12:22 PM Date of Service:  2018 12:11 PM Creation Time:  2018 12:11 PM    Status:  Signed :  Vamshi Monte RN (Registered Nurse)         [facility] Nurse Inpatient Wound Assessment     Follow up Assessment of wound(s) on pt's:   Distal left stump        Data:   Patient History:      per MD note(s):  Pt is s/p RIGHT TOTAL HIP ARTHROPLASTY 18 w Dr. Howard    Ottoniel Assessment and sub scores:   Ottoniel Score  Av.8  Min: 14  Max: 23     Positioning: Pillows,     Mattress:  Standard , Atmos Air mattress    Current Diet / Nutrition:[ZP1.1]       Active Diet Order      Advance Diet as Tolerated: Regular Diet Adult      Diet      Advance Diet as Tolerated[ZP1.2]  Recent Labs   Lab Test  18   0652   18   0714   10/30/18   1324   18   1641   ALBUMIN   --    --   3.0*   < >   --     "< >   --    HGB  8.1*   < >  7.1*   < >   --    < >  10.6*   INR   --    --    --    --   1.46*   < >   --    WBC  6.2   < >   --    < >   --    < >  4.4   A1C   --    --    --    --    --    --   4.6    < > = values in this interval not displayed.     Wound Assessment (location):   Distal left stump  Wound History:  Per discussion with pt and wife:  Pt had LLE amputated amputated about 5 years ago in [place].  Has been to Dr. Howard for debridements of the stump.  Both state that stump as never really developed a callus over that time.  Pt has had some crusty drainage that has developed off and and on over the years.  Wife says more recently a a fissure has developed with a bit of drainage and would like someone to look at it.  Pt has stopped wearing a stocking between skin and prosthetic device.  The device has a foul odor, no apparent drainage noted on gray, rubbery prosthetic   Pt states he has no sensation below the knee.   Pt has a 2\" diameter, circular, purple ring of nonblanchable  Erythema with lighter coloration this week,  near the lateral left knee, no itching or pain.  He was not aware of its presence   There is nothing obvious on the prosthetic that would make such an impression on the skin  Distal left stump- near the full base of the stump is dry, orange-isis scabs, adherent that looks like the start of callused tissue underlying.  There is a small, dry fissure that now appears to have epithelialized, no drainage noted.  Pt still wearing the gray prosthetic right next to the skin            Intervention:     Patient's chart evaluated.      Wound(s) assessed with wife    Orders  reviewed    Supplies  reviewed, discussed with RN, discussed with family and discussed with patient      Discussed plan of care with Patient, Family and Nurse,     All patient / family questions answered:  YES- discussed with wife to follow up on care of the distal stump and the prosthetic sleeve with the ortho group.  She " "will stop by the ortho clinic to ask questions      How often to replace prosthetic sleeve, how to clean it, it is odorous so now what, how to form callus, etc.          Assessment:          It looks like there may be a bit of a callus starting to form on the distal stump.  The fissure that sits amidst the crusty/ callus tissue now appears to have epithelialized in its base so no drainage.  At this time will recommend continuing with wound/ skin care that I laid out last week.  Will continue with the tiny spot of vaseline just on the tip of the stump to encourage that fissure to continue to heal.  Fissures can be very tough to heal so while pt in the hospital he is on his feet less so now is a good time to get site healed.  No pain with assessment.  No erythema.      Prosthetic is still smelly and odorous.  Again discussed with pt to wear a fabric sleeve next to the skin and to get the gray prosthetics either replaced or properly cleaned.       My conversation w wife and pt on 11/27/18:   Had a lengthy discussion with pt and wife about necessity of wearing a sock with the prosthetic, the rubbery \"sleeve\" that is worn next to skin is smelly and holding old drainage, sweat, wound and skin debris= not good for skin and wounds.  Pt needs to talk with orthotics about what they recommend for care of prosthetics devices, if they fit properly and if they want him wearing a washable sleeve next to skin, etc.     Pt intelligent but seems a bit difficult to talk with in that seems trying to justify not wearing a washable sock next to the skin, not doing BID skin/ wound cares and \"how can he wash his leg in the morning\"- told him I don't know how he will do it but the prosthetic smells and he needs to clean his skin more than the once a week he has been doing it.      I am not sure what the circular purple ring is on the left lateral knee, site does note seem to correlate with pressure caused by the prosthetic, does not seem to " "the normal coloration of ring worm(is purple vs crusty orange-red) but pt does have a very smelly rubbery device he wears on his leg and then this may just be a healing old bruise that is healing in a weird way  I had a brief conversation with Dr. Jorgensen asking him to please stop by and look at the site.  This site can just be left open to the air and cleaned BID        Plan:     Nursing to notify the Provider(s) and re-consult the [facility] Nurse if wound(s) deteriorate(s) or if the wound care plan needs reevaluation.    Plan of care for left stump: 2x/ day    1. Clean with gentle soap and water, dry and dry again.  Make sure is dry before putting on prosthetic.    2. Apply lotion down to the very distal aspect of the stump but not on the very bottom/ distal aspect- want that to become dry and callused    3. Apply very small dab of Vaseline just where the fissure is.  Goal is to keep that just a bit moist so it can heal but not moisten the surround callus    4. Cover distal stump with one, dry 4x4 then a 4x4 that is opened and conformed to the stump\    5. Wear a washable sleeve    6. Keep rubbery prosthetic clean  NOTE- fissures are very hard to heal.  Wound that has been on tip of stump is a fissure.  While in the hospital pt has had prosthesis on less so the skin and wound care combined with less pressure may have gotten that fissure to heal down.  If appears healed then could stop the Vaseline but continue with a good lotion to the end of the stump so the callused tissue does not get so dry it splits again.       PERFORM \"GOOD FOOT CARE to the right foot daily:  1. CLEAN right foot daily using gentle  soap and water, making sure to clean between the toes. Dry thoroughly, especially between the toes. Be careful when drying between toes to not use a sawing-action, this may cut the skin between the toes.   2. Spray the skin from toes to knee, not between the toes with William Cleanse and Protect, massage into skin and " "allow sit on skin for 5-10 minutes then wipe or rinse off (the William will \"condition\" the skin by loosening crusty debris, moisturizing and cleaning the skin)  3. LOTION from toes to knee, not between toes (lotion moisturizes and too much moisture between toes may cause a fungal rash).    4.  If noticing moisture, itching or odor between the toes dust with antifungal powder, think Desenex, twice a day x 4-5 days.    5. Clean SOCKS every day.  6.  SHOES:  Wear hard soled shoes at all times to minimize bumping feet and/or stepping on something.  This includes wearing hard soled slippers around the house.  No flip flops or open toed shoes in the summer, etc.  You need to protect your toes and feet. And if shoes do not fit then DO NOT WEAR THEM  7.CHECK FEET at least two to three times every day by feeling with hands/ fingers and fully visualizing (use a hand held mirror).  You are looking for new wounds, irritation, etc.  Recommend checking in morning before even getting out of bed and check again before bed.   Also would be important to check feet after removing shoes, assuring no wrinkles in socks and shoes fitting properly.  Air feet throughout the day, as able (drying out feet and relieving pressure)  8. BLOOD SUGARS:  Keep blood sugars in a good balance.   9.. Keep nails trimmed and filed to minimize snagging, this could create a wound.  Follow up with podiatry to trim nails as needed.   11. Are you using COMPRESSION WRAPS?  Assure you follow up for regular fittings.  Note- all wraps and stockings wear out.  Some after only a few weeks.      Will order orthotics and dietician to see pt    WOC Nurse will return: no need to continue to follow[ZP1.1]          Revision History        User Key Date/Time User Provider Type Action    > ZP1.2 12/4/2018 12:22 PM Vamshi Monte, RN Registered Nurse Sign     ZP1.1 12/4/2018 12:11 PM Vamshi Monte RN Registered Nurse             Progress Notes by Iftikhar Santamaria " "ALEXA Ortiz at 2018  7:59 AM     Author:  Iftikhar Santamaria PA-C Service:  Orthopedics Author Type:  Physician Assistant - C    Filed:  2018  8:04 AM Date of Service:  2018  7:59 AM Creation Time:  2018  7:59 AM    Status:  Signed :  Iftikhar Santamaria PA-C (Physician Assistant - C)         Antonio KINGS Ashley  2018  Patient status post RTHA  Admit Date:  2018      Normal healing wound.  Objective: pain controlled with oral dilaudid. States he is ready to discharge when we are ready for him to discharge.   Blood pressure 100/57, pulse 65, temperature 99.1  F (37.3  C), temperature source Oral, resp. rate 16, height 1.88 m (6' 2\"), weight 105.5 kg (232 lb 8 oz), SpO2 92 %.    Temperatures:  Current - Temp: 99.1  F (37.3  C); Max - Temp  Av.6  F (37  C)  Min: 97.7  F (36.5  C)  Max: 99.2  F (37.3  C)  Pulse range: Pulse  Av.3  Min: 63  Max: 71  Blood pressure range: Systolic (24hrs), Av , Min:96 , Max:111   ; Diastolic (24hrs), Av, Min:51, Max:60    Exam:  CMS: intact  alert, stable, wound ok  Dressing c/d/i: outer dressing removed and can be replaced. Do not touch the prineo dressing at all.   Patient with ecchymosis about the wound but wound intact and not draining.   DF/PF   Communicates clearly with examiner    Labs:  Recent Labs   Lab Test  18   0652  18   0622  18   0724   POTASSIUM  4.5  4.7  4.9     Recent Labs   Lab Test  18   0652  18   0622  18   1555   HGB  8.1*  7.1*  7.3*     Recent Labs   Lab Test  10/30/18   1324  18   0700  18   0807   INR  1.46*  1.68*  1.39*     Recent Labs   Lab Test  18   0652  18   0622  18   0724   PLT  112*  94*  101*       PLAN: continue current therapy regimen. Discharge to rehab facility today if bed available. Xarelto for DVT ppx and on this chronically for afib as well. Follow up with us at scheduled visit.[NH1.1]        Revision History        " "User Key Date/Time User Provider Type Action    > NH1.1 2018  8:04 AM Iftikhar Santamaria PA-C Physician Assistant - [facility] Sign            Progress Notes by Randee Stein RN at 12/3/2018  4:04 PM     Author:  Randee Stein RN Service:  ICU Author Type:      Filed:  12/3/2018  4:07 PM Date of Service:  12/3/2018  4:04 PM Creation Time:  12/3/2018  4:04 PM    Status:  Signed :  Randee Stein RN ()         Pt signed Detailed Notice of Discharge, copy given to patient.  Questions answered.[CF1.1]      Revision History        User Key Date/Time User Provider Type Action    > CF1.1 12/3/2018  4:07 PM Randee Stein RN Case Manager Sign            Progress Notes by Iftikhar Santamaria PA-C at 12/3/2018 12:07 PM     Author:  Iftikhar Santamaria PA-C Service:  Orthopedics Author Type:  Physician Assistant - C    Filed:  12/3/2018 12:11 PM Date of Service:  12/3/2018 12:07 PM Creation Time:  12/3/2018 12:07 PM    Status:  Signed :  Iftikhar Santamaria PA-C (Physician Assistant - C)         Antonio Ramirez  12/3/2018  Patient s/p RTHA  Admit Date:  2018      Normal healing wound.  Objective: patient with complaints of continued pain in the right hip. States he still feels weak and refusing to leave the hospital. He has called the appeal team and is working on not getting discharge.   Blood pressure 99/54, pulse 60, temperature 98.3  F (36.8  C), temperature source Oral, resp. rate 16, height 1.88 m (6' 2\"), weight 105.5 kg (232 lb 8 oz), SpO2 93 %.    Temperatures:  Current - Temp: 98.3  F (36.8  C); Max - Temp  Av.3  F (36.8  C)  Min: 97.8  F (36.6  C)  Max: 98.7  F (37.1  C)  Pulse range: Pulse  Av.5  Min: 55  Max: 60  Blood pressure range: Systolic (24hrs), Av , Min:99 , Max:107   ; Diastolic (24hrs), Av, Min:54, Max:64    Exam:  CMS: intact  alert, stable, wound ok  Dressing c/d/i  Calf non tender   DF/PF " 5/5  1-2+ edema in the RLE  Bruising noted distally at the edge of the dressing but no drainage.       Labs:  Recent Labs   Lab Test  12/02/18   0652  12/01/18   0622  11/30/18   0724   POTASSIUM  4.5  4.7  4.9     Recent Labs   Lab Test  12/02/18   0652  12/01/18   0622  11/30/18   1555   HGB  8.1*  7.1*  7.3*     Recent Labs   Lab Test  10/30/18   1324  05/24/18   0700  04/11/18   0807   INR  1.46*  1.68*  1.39*     Recent Labs   Lab Test  12/02/18   0652  12/01/18   0622  11/30/18   0724   PLT  112*  94*  101*       PLAN: continue current therapy regimen will full anterolateral hip precautions. Continue current xarelto.   Patient had US just now to r/o DVT in the RLE. We will continue to monitor. Patient was made clear and aware that staying in the hospital just for the reason of not being strong enough was not the best option for him. He runs a huge risk of nevaeh an infection the longer that he stays in the hospital. He said he was aware of this and was going to continue to push forward with the appeal process. He has a bed available at [facility] today if he changes his mind. From an ortho standpoint he is ready for discharge. Medical team managing other medical issues.[NH1.1]        Revision History        User Key Date/Time User Provider Type Action    > NH1.1 12/3/2018 12:11 PM Iftikhar Santamaria PA-C Physician Assistant - C Sign            Progress Notes by Anthony Baker MD at 12/3/2018 11:03 AM     Author:  Anthony Baker MD Service:  Hospitalist Author Type:  Physician    Filed:  12/3/2018 11:24 AM Date of Service:  12/3/2018 11:03 AM Creation Time:  12/3/2018 11:03 AM    Status:  Signed :  Anthony Baker MD (Physician)         [facility]    Hospitalist Progress Note    Assessment & Plan   Antonio Ramirez is a 73 year old male with a history of pulmonary HTN, paroxysmal a fib, 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  Hospitalist consulted for assistance with medical management       Right total hip arthroplasty:  -Patient underwent planned right hip arthroplasty by Dr. Howard on 11/27/18.  EBL 1200 mL.    -Routine postop care per orthopedic surgery.    Near syncope/syncope:  -Patient reportedly syncopized for several seconds on 11/29 although he downplayed that episode  -Per nursing report he needed a sternal rub to get a response  -Brief episode of syncope was likely multifactorial, in the postop setting, volume loss and blood loss anemia with valvular issues and severe pulmonary hypertension  -Continue to monitor on telemetry  -Repeat echo-not significantly changed from prior, appears like the wall motion abnormality mentioned on current echo was present on echo from 7/2018.  EF marginally decreased from 40-45% to 35-40%.  Moderate aortic stenosis  -Patient had further one episode of near syncope yesterday on 12/30; again this is likely multifactorial, appears to be volume down, multiple valvular problems and pulmonary hypertension.    -No further near syncope or syncope since[NB1.1].[NB1.2]    Acute kidney injury on chronic kidney disease stage III  -He is to have a baseline chronic kidney disease although his creatinine has been fluctuating lately, was normal on October 2018.  -Creatinine worsened from 1.59 to 2.09,[NB1.1] on 12/1[NB1.2] now improving, creatinine 1.[NB1.1]29[NB1.2] today[NB1.1]  -Continue diuretics.  Please see discussion below.  Recommend rechecking BMP on 12/7 at TCU if he discharges today.[NB1.2]    Acute postop on chronic anemia:  -Baseline hemoglobin appears to be between 10.5-12  -Received 1 additional unit of PRBC yesterday(2 since surgery)  -Hemoglobin stable at 8.1      Ischemic cardiomyopathy w/reduced EF.   CAD s/p CABG in 2007  HTN/HLP  Paroxysmal a-fib   Supposed history of antiphospholipid antibody/anticardiolpin  antibody  An echocardiogram on 7/11/18 showed EF of 40-45% with anterior, septal and apical wall akinesis. Severe pulmonary hypertension, moderate aortic stenosis, moderate mitral regurgitation.  Repeat echo as described above  -Blood pressures have been borderline, hold Coreg   -[NB1.1]T[NB1.2]orsemide[NB1.1] restarted 12/2.  Still has significant right lower extremity edema.  Will check venous Dopplers to rule out DVT.  -recommended compression stockings if venous Doppler is negative  -Denies dyspnea, not hypoxic  -Recommend additional doses of torsemide 20 mg at 4 PM for 3 days, further dosing of torsemide to be determined based on BMP and lower extremity edema on Friday.  Can be addressed by MD at TCU  -Outpatient follow-[facility] with [facility] in 2-3 weeks.[NB1.2]  -Holding ASA, restart once okay with ortho  -Xarelto restarted 11/28[NB1.1]  -Overnight oximetry done 2 nights ago was positive, recommend nighttime oxygen at sleep and during daytime naps.  Likely will need sleep study as outpatient.  Defer to PCP.[NB1.2]      Aortic root dilation   Last echo in July also showed the aortic sinus of Valsalva mildly dilated at 4.3 cm and ascending aorta was borderline at 3.7 cm   -Follow up as planned       H/o Alcohol abuse   Possible alcoholic liver disease w/cirrhosis and ascites  Had been drinking 3-4 drinks/night but supposedly quit in 2015 tough an ETOH level from 10/31/18 was 0.10.  Patient carries the history of decompensated alcoholic liver cirrhosis with ascites but CT imaging of the abdomen makes no mention of cirrhosis.  He has had ultrasounds in the past that have showed ascites and has had a paracentesis as recently as 10/30/18.   -no evidence of withdrawal at this time.      Generalized deconditioning:  -Appears to be weak overall, multifactorial  -Physical therapy recommending TCU       # Pain Assessment:  Current Pain Score 12/3/2018   Patient currently in pain? -   Pain score (0-10) 3   Pain location -    Pain descriptors -   CPOT pain score -   defer to primary service      D/W: RN  DVT Prophylaxis: xarelto  Code Status: Full Code    Disposition: Expected discharge; likely today[NB1.1] to[NB1.2] TCU[NB1.1] if venous Doppler negative[NB1.2].    Anthony Baker MD    Interval History[NB1.1]   Renal function stable.  Right lower extremity continues to be edematous.  Denies chest pain or dyspnea.  No further syncope or presyncope.  Continues to feel weak overall.  He told me he is not ready for discharge due to ongoing generalized weakness and pain[NB1.2]    -Data reviewed today: I reviewed all new labs and imaging results over the last 24 hours.  EKG shows controlled A. fib.  No dysrhythmias.    Physical Exam   Temp: 98.3  F (36.8  C) Temp src: Oral BP: 99/54 Pulse: 60 Heart Rate: 57 Resp: 16 SpO2: 93 % O2 Device: None (Room air) Oxygen Delivery: 1 LPM  Vitals:    11/27/18 0737   Weight: 105.5 kg (232 lb 8 oz)     Vital Signs with Ranges  Temp:  [97.8  F (36.6  C)-98.7  F (37.1  C)] 98.3  F (36.8  C)  Pulse:  [55-60] 60  Heart Rate:  [57-62] 57  Resp:  [16] 16  BP: ()/(54-64) 99/54  SpO2:  [93 %-100 %] 93 %  I/O last 3 completed shifts:  In: 1065 [P.O.:240; I.V.:825]  Out: 1175 [Urine:1175]    Constitutional: AAOX3[NB1.1], sitting up in a chair.[NB1.2]  NAD  Respiratory:   CTA B/L, Normal WOB  Cardiovascular: RRR, systolic murmur in the left parasternal area and aortic area   GI: Soft, Non- tender, BS- normoactive  Skin/Integument: Warm and dry, no rashes  MSK: No joint deformity or swelling, 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[NB1.1] and 1-2+ edema of right lower extremely[NB1.2]  Neuro: CN- grossly intact, moving extremities appropriately, no tremors      Medications           fluticasone  1 spray Both Nostrils Daily     folic acid  1 mg Oral Daily     gabapentin  600 mg Oral BID     multivitamin w/minerals  1 tablet Oral Daily     rivaroxaban ANTICOAGULANT  20 mg Oral Daily with supper      "senna-docusate  1 tablet Oral BID    Or     senna-docusate  2 tablet Oral BID     sodium chloride (PF)  3 mL Intracatheter Q8H     [START ON 12/4/2018] torsemide  20 mg Oral Daily     torsemide  20 mg Oral Daily at 4 pm     vitamin B1  100 mg Oral Daily       Data       Recent Labs  Lab 12/03/18  0714 12/02/18  0652 12/01/18  0622 11/30/18  1555 11/30/18  0724 11/29/18  0714   WBC  --  6.2 6.2  --  6.2  --    HGB  --  8.1* 7.1* 7.3* 7.9* 7.1*   MCV  --  92 93  --  96  --    PLT  --  112* 94*  --  101* 91*   NA  --  133 132*  --  133 133   POTASSIUM  --  4.5 4.7  --  4.9 4.8   CHLORIDE  --  102 102  --  102 101   CO2  --  26 26  --  25 28   BUN  --  36* 35*  --  29 21   CR 1.29* 1.61* 2.09*  --  2.07* 1.59*   ANIONGAP  --  5 4  --  6 4   BENNIE  --  8.5 8.5  --  8.5 8.2*   GLC  --  97 102*  --  88 91   ALBUMIN  --   --   --   --   --  3.0*   PROTTOTAL  --   --   --   --   --  6.4*   BILITOTAL  --   --   --   --   --  0.8   ALKPHOS  --   --   --   --   --  65   ALT  --   --   --   --   --  10   AST  --   --   --   --   --  25       No results found for this or any previous visit (from the past 24 hour(s)).[NB1.1]     Revision History        User Key Date/Time User Provider Type Action    > NB1.2 12/3/2018 11:24 AM Anthony Baker MD Physician Sign     NB1.1 12/3/2018 11:03 AM Anthony Baker MD Physician             Progress Notes by Wilfred Bello at 12/3/2018  9:55 AM     Author:  Wilfred Bello Service:  (none) Author Type:      Filed:  12/3/2018  9:57 AM Date of Service:  12/3/2018  9:55 AM Creation Time:  12/3/2018  9:55 AM    Status:  Signed :  Wilfred Bello ()         [facility] Progress Note  [facility]    Visited per LOS.      introduced themselves and oriented Pt with  services. Pt said: \"There is nothing you can help me with my dear.\"     provided a kind and caring presence.     has no plans to follow.     Wilfred Bello  Chaplain Resident[BC1.1]     " Revision History        User Key Date/Time User Provider Type Action    > BC1.1 12/3/2018  9:57 AM Wilfred Bello Sign            Progress Notes by Kostas Castellano LSW at 12/2/2018  3:08 PM     Author:  Kostas Castellano LSW Service:  (none) Author Type:      Filed:  12/2/2018  3:09 PM Date of Service:  12/2/2018  3:08 PM Creation Time:  12/2/2018  3:08 PM    Status:  Signed :  Kostas Castellano LSW ()         SW:    D: FAHEEM following for discharge needs. FAHEEM informed patient request referral to [facility]. FAHEEM sent referral via [facility], to check bed availability.     P: Will continue to follow.[NA1.1]     Revision History        User Key Date/Time User Provider Type Action    > NA1.1 12/2/2018  3:09 PM Kostas Castellano LSW  Sign            Progress Notes by Anthony Baker MD at 12/2/2018 12:35 PM     Author:  Anthony Baker MD Service:  Hospitalist Author Type:  Physician    Filed:  12/2/2018 12:39 PM Date of Service:  12/2/2018 12:35 PM Creation Time:  12/2/2018 12:35 PM    Status:  Signed :  Anthony Baker MD (Physician)         [facility]    Hospitalist Progress Note    Assessment & Plan   Antonio Ramirez is a 73 year old male with a history of pulmonary HTN, paroxysmal a fib, 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  Hospitalist consulted for assistance with medical management       Right total hip arthroplasty:  -Patient underwent planned right hip arthroplasty by Dr. Howard on 11/27/18.  EBL 1200 mL.    -Routine postop care per orthopedic surgery.    Near syncope/syncope:  -Patient reportedly syncopized for several seconds on 11/29 although he downplayed that episode  -Per nursing report he needed a sternal rub to get a response  -Brief episode of syncope was likely multifactorial, in the postop setting, volume loss  and blood loss anemia with valvular issues and severe pulmonary hypertension  -Continue to monitor on telemetry  -Repeat echo-not significantly changed from prior, appears like the wall motion abnormality mentioned on current echo was present on echo from 7/2018.  EF marginally decreased from 40-45% to 35-40%.  Moderate aortic stenosis  -Patient had further one episode of near syncope yesterday on 12/30; again this is likely multifactorial, appears to be volume down, multiple valvular problems and pulmonary hypertension.    -No further near syncope or syncope since per  -Monitor for now    Acute kidney injury on chronic kidney disease stage III  -He is to have a baseline chronic kidney disease although his creatinine has been fluctuating lately, was normal on October 2018.  -Creatinine worsened from 1.59 to 2.09, now improving, creatinine 1.6 today    Acute postop on chronic anemia:  -Baseline hemoglobin appears to be between 10.5-12  -Received 1 additional unit of PRBC yesterday(2 since surgery)  -Hemoglobin stable at 8.1 per      Ischemic cardiomyopathy w/reduced EF.   CAD s/p CABG in 2007  HTN/HLP  Paroxysmal a-fib   Supposed history of antiphospholipid antibody/anticardiolpin antibody  An echocardiogram on 7/11/18 showed EF of 40-45% with anterior, septal and apical wall akinesis. Severe pulmonary hypertension, moderate aortic stenosis, moderate mitral regurgitation.  Repeat echo as described above  -Blood pressures have been borderline, hold Coreg   -Appears slightly volume up, will restart torsemide this morning.  -Holding ASA, restart once okay with ortho  -Xarelto restarted 11/28      Aortic root dilation   Last echo in July also showed the aortic sinus of Valsalva mildly dilated at 4.3 cm and ascending aorta was borderline at 3.7 cm   -Follow up as planned       H/o Alcohol abuse   Possible alcoholic liver disease w/cirrhosis and ascites  Had been drinking 3-4 drinks/night but supposedly quit in 2015 tough  an ETOH level from 10/31/18 was 0.10.  Patient carries the history of decompensated alcoholic liver cirrhosis with ascites but CT imaging of the abdomen makes no mention of cirrhosis.  He has had ultrasounds in the past that have showed ascites and has had a paracentesis as recently as 10/30/18.   -no evidence of withdrawal at this time.      Generalized deconditioning:  -Appears to be weak overall, multifactorial  -Physical therapy recommending TCU       # Pain Assessment:  Current Pain Score 12/2/2018   Patient currently in pain? -   Pain score (0-10) 6   Pain location Leg   Pain descriptors -   CPOT pain score -   defer to primary service      D/W: RN  DVT Prophylaxis: xarelto  Code Status: Full Code    Disposition: Expected discharge; likely later today or tomorrow to TCU.    Anthony Baker MD    Interval History   Overall feels slightly better compared to yesterday although still weak overall.  Hemoglobin stable at 8.1.  Renal function better.  No hematochezia or melena.    -Data reviewed today: I reviewed all new labs and imaging results over the last 24 hours.  EKG shows controlled A. fib.  No dysrhythmias.    Physical Exam   Temp: 98.4  F (36.9  C) Temp src: Oral BP: 128/73 Pulse: 62 Heart Rate: 68 Resp: 16 SpO2: 97 % O2 Device: None (Room air) Oxygen Delivery: 1 LPM  Vitals:    11/27/18 0737   Weight: 105.5 kg (232 lb 8 oz)     Vital Signs with Ranges  Temp:  [97.1  F (36.2  C)-99.8  F (37.7  C)] 98.4  F (36.9  C)  Pulse:  [62] 62  Heart Rate:  [64-79] 68  Resp:  [14-18] 16  BP: (104-128)/(54-73) 128/73  SpO2:  [93 %-100 %] 97 %  I/O last 3 completed shifts:  In: 806.25 [P.O.:400]  Out: 550 [Urine:550]    Constitutional: AAOX3, no hallucinations.  NAD  Respiratory:   CTA B/L, Normal WOB  Cardiovascular: RRR, systolic murmur in the left parasternal area and aortic area   GI: Soft, Non- tender, BS- normoactive  Skin/Integument: Warm and dry, no rashes  MSK: No joint deformity or swelling, dependent edema  on left old amputation stump  Neuro: CN- grossly intact, moving extremities appropriately, no tremors      Medications           fluticasone  1 spray Both Nostrils Daily     folic acid  1 mg Oral Daily     gabapentin  600 mg Oral BID     multivitamin w/minerals  1 tablet Oral Daily     rivaroxaban ANTICOAGULANT  20 mg Oral Daily with supper     senna-docusate  1 tablet Oral BID    Or     senna-docusate  2 tablet Oral BID     sodium chloride (PF)  3 mL Intracatheter Q8H     torsemide  20 mg Oral Daily     vitamin B1  100 mg Oral Daily       Data       Recent Labs  Lab 12/02/18  0652 12/01/18  0622 11/30/18  1555 11/30/18  0724 11/29/18  0714   WBC 6.2 6.2  --  6.2  --    HGB 8.1* 7.1* 7.3* 7.9* 7.1*   MCV 92 93  --  96  --    * 94*  --  101* 91*    132*  --  133 133   POTASSIUM 4.5 4.7  --  4.9 4.8   CHLORIDE 102 102  --  102 101   CO2 26 26  --  25 28   BUN 36* 35*  --  29 21   CR 1.61* 2.09*  --  2.07* 1.59*   ANIONGAP 5 4  --  6 4   BENNIE 8.5 8.5  --  8.5 8.2*   GLC 97 102*  --  88 91   ALBUMIN  --   --   --   --  3.0*   PROTTOTAL  --   --   --   --  6.4*   BILITOTAL  --   --   --   --  0.8   ALKPHOS  --   --   --   --  65   ALT  --   --   --   --  10   AST  --   --   --   --  25       No results found for this or any previous visit (from the past 24 hour(s)).[NB1.1]     Revision History        User Key Date/Time User Provider Type Action    > NB1.1 12/2/2018 12:39 PM Anthony Baker MD Physician Sign            Progress Notes by Shahbaz Vilchis MD at 12/2/2018  9:53 AM     Author:  Shahbaz Vilchis MD Service:  Orthopedics Author Type:  Physician    Filed:  12/2/2018  9:55 AM Date of Service:  12/2/2018  9:53 AM Creation Time:  12/2/2018  9:53 AM    Status:  Signed :  Shahbaz Vilchis MD (Physician)         POD#5 R TOBI Dr. Howard    Poor mobilization and participation with PT.  Reports pain and heaviness of right leg  Denies CP or SOB. No N/V  AVSS  Hgb 8.1 today after 1U prbcs  yesterday  PT recommending TCU  Clearly not ready for home today  Will place SW order for TCU placement and have Dr. Howard discuss with patient tomorrow to formalize disposition plan[DA1.1]    Shahbaz iVlchis[DA1.2]       Revision History        User Key Date/Time User Provider Type Action    > DA1.2 12/2/2018  9:55 AM Shahbaz Vilchis MD Physician Sign     DA1.1 12/2/2018  9:53 AM Shahbaz Vilchis MD Physician             Progress Notes by Anthony Baker MD at 12/1/2018 10:56 AM     Author:  Anthony Baker MD Service:  Hospitalist Author Type:  Physician    Filed:  12/2/2018  7:26 AM Date of Service:  12/1/2018 10:56 AM Creation Time:  12/1/2018 10:56 AM    Status:  Signed :  Anthony Baker MD (Physician)         [facility]    Hospitalist Progress Note    Assessment & Plan   Antonio Ramirez is a 73 year old male with a history of pulmonary HTN, paroxysmal a fib, 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  Hospitalist consulted for assistance with medical management       Right total hip arthroplasty:  -Patient underwent planned right hip arthroplasty by Dr. Howard on 11/27/18.  EBL 1200 mL.    -Will defer post-op management including pain control, IVF, diet, DVT prophylaxis per primary[NB1.1] service    Near syncope/syncope:  -Patient reportedly syncopized for several seconds 2 days ago[NB1.2] morning although he downplayed that episode  -Per nursing report he needed a sternal rub to get a response  -Brief episode of syncope was likely multifactorial, in the postop setting, volume loss and blood loss anemia with valvular issues and severe pulmonary hypertension  -Continue to monitor on telemetry  -Repeat echo-not significantly changed from prior, appears like the wall motion abnormality mentioned on current echo was present on echo from 7/2018.  EF  marginally decreased from 40-45% to 35-40%.  Moderate aortic stenosis  -[NB1.1]Patient had further one episode of near syncope yesterday evening on 12/30; again this is likely multifactorial, appears to be volume down, multiple valvular problems and pulmonary hypertension.  Monitor for now[NB1.2]    Acute kidney injury on chronic kidney disease stage III  -He is to have a baseline chronic kidney disease although his creatinine has been fluctuating lately, was normal on October 2018.  -Creatinine worsened from 1.59[NB1.1] to[NB1.2] 2.0[NB1.1]9[NB1.2],[NB1.1] urine output slightly better but still borderline[NB1.2]  -[NB1.1]Transfusion as below[NB1.2]  -Recheck BMP in the morning    Acute postop on chronic anemia:  -Baseline hemoglobin appears to be between 10.5-12  -Hemoglobin dropped to 7.1, yesterday, improved with 1 unit of PRBC transfusion at 7.9[NB1.1] however has again now drifted down to 7.1.  Patient denies hematochezia or melena.  -Transfuse 1 unit of PRBC today, closely monitor CBC.[NB1.2]      Ischemic cardiomyopathy w/reduced EF.   CAD s/p CABG in 2007  HTN/HLP  Paroxysmal a-fib   Supposed history of antiphospholipid antibody/anticardiolpin antibody  An echocardiogram on 7/11/18 showed EF of 40-45% with anterior, septal and apical wall akinesis. Severe pulmonary hypertension, moderate aortic stenosis, moderate mitral regurgitation.  Repeat echo as described above  -[NB1.1]Blood pressures have been borderline, hold Coreg and torsemide.[NB1.2]  -Holding ASA, restart once okay with ortho  -Xarelto restarted 11/28      Aortic root dilation   Last echo in July also showed the aortic sinus of Valsalva mildly dilated at 4.3 cm and ascending aorta was borderline at 3.7 cm   -Follow up as planned       H/o Alcohol abuse   Possible alcoholic liver disease w/cirrhosis and ascites  Had been drinking 3-4 drinks/night but supposedly quit in 2015 tough an ETOH level from 10/31/18 was 0.10.  Patient carries the history  of decompensated alcoholic liver cirrhosis with ascites but CT imaging of the abdomen makes no mention of cirrhosis.  He has had ultrasounds in the past that have showed ascites and has had a paracentesis as recently as 10/30/18.   -no evidence of withdrawal   -D/w Dr Howard who expressed concern about his history of alcohol use, will continue to monitor, obviously patient not in active withdrawal at this time.           # Pain Assessment:  Current Pain Score 12/1/2018   Patient currently in pain? sleeping: patient not able to self report   Pain score (0-10) -   Pain location -   Pain descriptors -   CPOT pain score -   defer to primary service      D/W: RN  DVT Prophylaxis: xarelto  Code Status: Full Code    Disposition: Expected discharge[NB1.1] likely in a day or 2 once medically stable[NB1.2]  Anthony Baker MD    Interval History[NB1.1]     Hemoglobin again dropped to 7.1.  Patient had one further episode of near syncope yesterday evening however denies chest pain or dyspnea.  No further syncope today.  Creatinine about the same at 2.09.  Still having decreased urine output.  Patient with decreased oral intake, had one episode of emesis yesterday evening per[NB1.2]    -Data reviewed today: I reviewed all new labs and imaging results over the last 24 hours. I personally reviewed the EKG tracing showing Controlled A. fib.      Physical Exam   Temp: 98  F (36.7  C) Temp src: Oral BP: 105/59 Pulse: 67 Heart Rate: 72 Resp: 14 SpO2: 96 % O2 Device: Nasal cannula Oxygen Delivery: 1 LPM  Vitals:    11/27/18 0737   Weight: 105.5 kg (232 lb 8 oz)     Vital Signs with Ranges  Temp:  [98  F (36.7  C)-100.8  F (38.2  C)] 98  F (36.7  C)  Pulse:  [62-67] 67  Heart Rate:  [57-72] 72  Resp:  [14-18] 14  BP: ()/(50-70) 105/59  SpO2:  [77 %-100 %] 96 %  I/O last 3 completed shifts:  In: 1000 [P.O.:350; I.V.:650]  Out: 500 [Urine:200; Emesis/NG output:300]    Constitutional: AAOX3,[NB1.1] appears to be forgetful at the  moment,[NB1.2] NAD  Respiratory:   CTA B/L, Normal WOB  Cardiovascular: RRR, systolic murmur in the left parasternal area and aortic area   GI: Soft, Non- tender, BS- normoactive  Skin/Integument: Warm and dry, no rashes  MSK: No joint deformity or swelling,[NB1.1] 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[NB1.2]  Neuro: CN- grossly intact, moving extremities appropriately[NB1.1], no tremors[NB1.2]      Medications           fluticasone  1 spray Both Nostrils Daily     gabapentin  600 mg Oral BID     rivaroxaban ANTICOAGULANT  15 mg Oral Daily with supper     senna-docusate  1 tablet Oral BID    Or     senna-docusate  2 tablet Oral BID     sodium chloride (PF)  3 mL Intracatheter Q8H       Data       Recent Labs  Lab 12/01/18  0622 11/30/18  1555 11/30/18  0724 11/29/18  0714  11/27/18  1025   WBC 6.2  --  6.2  --   --  4.7   HGB 7.1* 7.3* 7.9* 7.1*  < > 10.4*   MCV 93  --  96  --   --  96   PLT 94*  --  101* 91*  --  88*   *  --  133 133  --   --    POTASSIUM 4.7  --  4.9 4.8  --   --    CHLORIDE 102  --  102 101  --   --    CO2 26  --  25 28  --   --    BUN 35*  --  29 21  --   --    CR 2.09*  --  2.07* 1.59*  --   --    ANIONGAP 4  --  6 4  --   --    BENNIE 8.5  --  8.5 8.2*  --   --    *  --  88 91  < >  --    ALBUMIN  --   --   --  3.0*  --   --    PROTTOTAL  --   --   --  6.4*  --   --    BILITOTAL  --   --   --  0.8  --   --    ALKPHOS  --   --   --  65  --   --    ALT  --   --   --  10  --   --    AST  --   --   --  25  --   --    < > = values in this interval not displayed.    No results found for this or any previous visit (from the past 24 hour(s)).[NB1.1]     Revision History        User Key Date/Time User Provider Type Action    > NB1.2 12/2/2018  7:26 AM Anthony Baker MD Physician Sign     NB1.1 12/1/2018 10:56 AM Anthony Baker MD Physician                   Procedure Notes     No notes of this type exist for this encounter.      Progress Notes - Therapies (Notes from 12/02/18  through 12/05/18)     No notes of this type exist for this encounter.

## 2018-11-27 NOTE — IP AVS SNAPSHOT
[address] [facility]    [address] [place] [address]    Phone:  [phone]                                       After Visit Summary   11/27/2018    Antonio Ramirez    MRN: [id number]           After Visit Summary Signature Page     I have received my discharge instructions, and my questions have been answered. I have discussed any challenges I see with this plan with the nurse or doctor.    ..........................................................................................................................................  Patient/Patient Representative Signature      ..........................................................................................................................................  Patient Representative Print Name and Relationship to Patient    ..................................................               ................................................  Date                                   Time    ..........................................................................................................................................  Reviewed by Signature/Title    ...................................................              ..............................................  Date                                               Time          22EPIC Rev 08/18

## 2018-11-27 NOTE — IP AVS SNAPSHOT
` ` Patient Information     Patient Name Sex     Antonio Ramirez ([id number]) Male 1945       Room Bed    0914 5514-01      Patient Demographics     Address Phone E-mail Address    [address]   [place] [address] [phone] (Home)  [phone] (Mobile) *Preferred* [email or web address]      Patient Ethnicity & Race     Ethnic Group Patient Race    American White      Emergency Contact(s)     Name Relation Home Work Mobile    Helena Ramirez Spouse   [phone]    María Ramirez  Daughter   [phone]      Documents on File        Status Date Received Description       Documents for the Patient    Privacy Notice - [place] Received 14     External Medication Information Consent Accepted 14     Consent for Services - Hospital/Clinic  ()      Consent for EHR Access Received 14     [facility] Specified Other       Consent for Services - Hospital/Clinic Received () 14     Consent for Services/Privacy Notice - Hospital/Clinic Received () 10/12/16     Insurance Card Received () 17 BCBS    Patient ID Received 17 [place]  2019    Care Everywhere Prospective Auth Received 10/20/17     Consent for Services/Privacy Notice - Hospital/Clinic Received () 10/20/17     Consent for Services/Privacy Notice - Hospital/Clinic  () 17 CONSENT FOR SERVICE    Consent for Services - Geriatrics Received 17     Insurance Card Received 18 BCBS     Insurance Card Received () 18 MEDICARE 2018    Consent for Services/Privacy Notice - Hospital/Clinic Received 18     Consent for Services - Hospital and Clinic Received 18     HIE Auth Received 18     Physical Therapy Certification Received 18 to 18    Consent to Communicate  18 AUTHORIZATION TO DISCUSS PROTECTED  HEALTH INFORMATION 18    Business/Insurance/Care Coordination/Health Form - Patient  18 ADULT REHABILITATION  ATTENDANCE POLICY 18    Occupational Therapy Certification Received 18     HIM CULLEN Authorization  18     Physical Therapy Certification Received 18 re-evaluation 18 to 1718    Insurance Card Received () 18 medicare-no new card yet    Insurance Card Received 18 Medicare--New    Consent to Communicate  18 AUTHORIZATION TO DISCUSS PROTECTED  HEALTH INFORMATION 18    HIM CULLEN Authorization - File Only  18 AUTHORIZATION FOR ELECTRONIC COMMUNICATION    Business/Insurance/Care Coordination/Health Form - Patient  18 ADULT REHABILITATION ATTENDANCE POLICY 18    Occupational Therapy Certification Received 10/03/18 8/29/18 - 18    HIM CULLEN Authorization  18 [place]    Insurance Card Received (Deleted) 14 BCBS    Patient ID Received (Deleted) 14 [place] DL    Insurance Card Received (Deleted) 10/12/16     Patient ID Received (Deleted) 10/12/16     Insurance Card Received (Deleted) 17 Medicare    Patient ID Received (Deleted) 17     CE Persistent Point of Care Auth Received 18 CE Persistent Point of Care Authorization    CE Persistent Point of Care Auth Received 18 CE Persistent Point of Care Authorization       Documents for the Encounter    CMS IM for Patient Signature Received 18     Plan of Care  18 POST OPERATIVE PLAN OF CARE    CMS IM for Patient Signature Received 18 2MM    CMS IM for Patient Signature Received 18 3rd ANNABELLE    Detailed Notice of Discharge Received 18     CE Point of Care Auth Received      Oximetry - HIM Scan  18 NOCTURNAL OXIMETRY    Oximetry - HIM Scan  18 NOCTURNAL OXIMETRY      Admission Information     Attending Provider Admitting Provider Admission Type Admission Date/Time    Saima Howard MD Vorlicky, Loren N, MD Elective 18  0525    Discharge Date Hospital Service Auth/Cert Status Service Area     [facility]  SERVICES    Unit Room/Bed Admission Status        55 ORTHO SPEC UNIT 5514/5514-01 Admission (Confirmed)       Admission     Complaint    DJD RIGHT HIP, S/P total hip arthroplasty      Hospital Account     Name Acct ID Class Status Primary Coverage    Antonio Ramirez [id number] Inpatient Open MEDICARE - MEDICARE            Guarantor Account (for Hospital Account #[id number])     Name Relation to Pt Service Area Active? Acct Type    Antonio Ramirez Self FCS Yes Personal/Family    Address Phone          [address] [place] [address] [phone](H)              Coverage Information (for Hospital Account #[id number])     1. MEDICARE/MEDICARE     F/O Payor/Plan Precert #    MEDICARE/MEDICARE     Subscriber Subscriber #    Antonio Ramirez [id number]    Address Phone    [employer]  [address]  [place] [address] [phone]          2. BCBS/[place]     F/O Payor/Plan Precert #    [place]     Subscriber Subscriber #    Antonio Ramirez [id number]    Address Phone    [address] [address]  [place], [place] [id number] [phone]

## 2018-11-27 NOTE — LETTER
Transition Communication Hand-off for Care Transitions to Next Level of Care Provider    Name: Antonio Ramirez  : 1945  MRN #: [id number]  Primary Care Provider: [facility]     Primary Clinic: [address]  [place] [address]     Reason for Hospitalization:  DJD RIGHT HIP  S/P total hip arthroplasty  Admit Date/Time: 2018  5:25 AM  Discharge Date: 18  Payor Source: Payor: MEDICARE / Plan: MEDICARE / Product Type: Medicare /     Readmission Assessment Measure (MANSOOR) Risk Score/category: elevated    Reason for Communication Hand-off Referral:  Elevated MANSOOR score    Discharge Plan: To [facility]       Concern for non-adherence with plan of care:   No  Discharge Needs Assessment:  Needs       Most Recent Value    Equipment Currently Used at Home cane, straight, wheelchair, manual, walker, rolling, shower chair, grab bar    Transportation Available car, family or friend will provide    Skilled Nursing [facility]) [phone], Fax:[phone]    PAS Number [id number]          Already enrolled in Tele-monitoring program and name of program:  No  Follow-up specialty is recommended: Yes, ortho    Follow-up plan:  Future Appointments  Date Time Provider Department Center   2018 7:30 AM Oksana Najera APRN CNP [facility]   2018 9:00 AM Latia Hall, OT [facility]   2018 10:45 AM Elena Downs MD [facility]       Any outstanding tests or procedures:    Procedures     Future Labs/Procedures    Oxygen - Nasal cannula     Comments:    2 Lpm by nasal cannula to keep O2 sats 92% or greater at night and during sleep/naps.          Referrals     Future Labs/Procedures    Home Care PT Referral for Hospital Discharge     Comments:    PT to eval and treat    Your provider has ordered home care - physical therapy. If you have not been contacted within 2 days of your discharge please call the department phone number listed  on the top of this document.    Occupational Therapy Adult Consult     Comments:    Evaluate and treat as clinically indicated.    Reason:  Patient with recent RTHA. Also has history of left BKA    Physical Therapy Adult Consult     Comments:    Evaluate and treat as clinically indicated.    Reason:  Patient admitted status post right total hip replacement. Has history of left BKA as well.        Supplies     Future Labs/Procedures    AntiEmbolism Stockings     Comments:    Bilateral below knee length.On in the morning, off at night    Pneumatic Compression Device      Comments:    Bilateral calf. Remove 30 mins BID.          Key Recommendations:  Pt discharged to TCU.  Very apprehensive in the care he will receive at TCU.    Randee Stein RN, BSN    AVS/Discharge Summary is the source of truth; this is a helpful guide for improved communication of patient story

## 2018-11-27 NOTE — IP AVS SNAPSHOT
MRN:[id number]                      After Visit Summary   11/27/2018    Antonio Ramirez    MRN: [id number]           Thank you!     Thank you for choosing [facility] for your care. Our goal is always to provide you with excellent care. Hearing back from our patients is one way we can continue to improve our services. Please take a few minutes to complete the written survey that you may receive in the mail after you visit with us. Thank you!        Patient Information     Date Of Birth          1945        Designated Caregiver       Most Recent Value    Caregiver    Will someone help with your care after discharge? yes    Name of designated caregiver cande- spouse    Phone number of caregiver     Caregiver address [place]      About your hospital stay     You were admitted on:  November 27, 2018 You last received care in the:  [place] [facility] [address] [facility]    You were discharged on:  December 5, 2018        Reason for your hospital stay       Patient admitted status post right total hip replacement            Reason for your hospital stay       Patient admitted status post right total hip replacement. 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                  Who to Call     For medical emergencies, please call 911.  For non-urgent questions about your medical care, please call your primary care provider or clinic, [phone]  For questions related to your surgery, please call your surgery clinic        Attending Provider     Provider Specialty    Saima Howard MD Orthopedics       Primary Care Provider Office Phone # Fax #    [facility] [phone]      After Care Instructions     Activity       Your activity upon discharge: activity as tolerated            Activity - Up with assistive device           Activity - Up with nursing assistance           Additional Discharge Instructions        Patient is to slowly progress back into their activities over the next several weeks.   They are to keep the Prineo(mesh) dressing in place at all times and do not remove it.   The island dressing can be removed. You can shower but try and keep the incision site covered and do not get it wet. Do not at any point in time, submerge the incision or soak the incision.   Monitor the wound closely for any foul smelling or abnormal discharge. Any temperatures over 101.5 with chills, or any redness about the incision please contact our office immediately. If you experience any chest pains, or shortness of breath, go directly to the emergency department.   Follow up with Dr. Howard in approximately 2 weeks and call if you have any complaints between now and your follow up.   Call [phone] to schedule your follow up if you do not already have one scheduled.   Make sure to work with physical therapy regularly and the days that you do not have a physical therapy appointment, you must do the exercises on your own.   Make sure that you have someone with you when you get up and move around the house and make sure you use some form of ambulatory assistance at all times, preferably a walker.   Take aspirin for prevention of blood clots  Take dilaudid for pain control  Take senokot to help with constipation  Take atarax to help with spasms/itching/relaxation  Take zofran to help with nausea  Use a walker at all times and do not get up unless someone is helping you.   Maintain full anterolateral hip precautions at all times. Do not perform any hip abduction.            Advance Diet as Tolerated       Follow this diet upon discharge: Orders Placed This Encounter      Room Service      Advance Diet as Tolerated: Regular Diet Adult      Diet            Diet       Follow this diet upon discharge: Orders Placed This Encounter      Advance Diet as Tolerated: Regular Diet Adult            Discharge Instructions       Patient is to  slowly progress back into their activities over the next several weeks.   They are to keep the Prineo(mesh) dressing in place at all times and do not remove it.   The island dressing can be removed. You can shower but try and keep the incision site covered and do not get it wet. Do not at any point in time, submerge the incision or soak the incision.   Monitor the wound closely for any foul smelling or abnormal discharge. Any temperatures over 101.5 with chills, or any redness about the incision please contact our office immediately. If you experience any chest pains, or shortness of breath, go directly to the emergency department.   Follow up with Dr. Howard in approximately 2 weeks and call if you have any complaints between now and your follow up.   Call [phone] to schedule your follow up if you do not already have one scheduled.   Maintain hip precautions with no hip abduction until we clear you to do so.   Make sure that you have someone with you when you get up and move around the house and make sure you use some form of ambulatory assistance at all times, preferably a walker.   Take aspirin and xarelto for prevention of blood clots  Take dilaudid for pain control  Take senokot to help with constipation  Take atarax to help with spasms/itching/relaxation  Take zofran to help with nausea  Wear the compression stockings as you do at home to help control swelling.   Use a walker at all times.   Do not drive until we clear you to do so.            Fall precautions           General info for SNF       Length of Stay Estimate: Short Term Care: Estimated # of Days <30  Condition at Discharge: Improving  Level of care:skilled   Rehabilitation Potential: Good  Admission H&P remains valid and up-to-date: Yes  Recent Chemotherapy: N/A  Use Nursing Home Standing Orders: Yes            Hip precautions       Anterolateral hip precautions with no hip abduction.            Intake and output       Every shift             Mantoux instructions       Give two-step Mantoux (PPD) Per Facility Policy Yes            Oxygen - Nasal cannula       2 Lpm by nasal cannula to keep O2 sats 92% or greater at night and during sleep/naps.            Weight bearing status       WBAT in the RLE with full anterolateral hip precautions in place.                  Follow-up Appointments     Follow Up and recommended labs and tests       F/U MD at [facility]. CBC and BMP on 12/7/2018.  F/U Dr Patt Downs, [facility] heart in 2-3 weeks            Follow-up and recommended labs and tests        Follow up with Dr. Howard in 2 weeks and call if there are any complaints between now and his follow up.                  Additional Services     Home Care PT Referral for Hospital Discharge       PT to eval and treat    Your provider has ordered home care - physical therapy. If you have not been contacted within 2 days of your discharge please call the department phone number listed on the top of this document.            Occupational Therapy Adult Consult       Evaluate and treat as clinically indicated.    Reason:  Patient with recent RTHA. Also has history of left BKA            Physical Therapy Adult Consult       Evaluate and treat as clinically indicated.    Reason:  Patient admitted status post right total hip replacement. Has history of left BKA as well.                  Future tests that were ordered for you     AntiEmbolism Stockings       Bilateral below knee length.On in the morning, off at night            Pneumatic Compression Device        Bilateral calf. Remove 30 mins BID.                  Pending Results     Date and Time Order Name Status Description    11/29/2018 0833 EKG 12-lead, tracing only Preliminary             Statement of Approval     Ordered          12/03/18 0958  I have reviewed and agree with all the recommendations and orders detailed in this document.  EFFECTIVE NOW     Approved and electronically signed by:  Iftikhar Santamaria PA-C         "   11/29/18 1036  I have reviewed and agree with all the recommendations and orders detailed in this document.  EFFECTIVE NOW     Approved and electronically signed by:  Iftikhar Santamaria PA-C           11/28/18 0706  I have reviewed and agree with all the recommendations and orders detailed in this document.  EFFECTIVE NOW     Approved and electronically signed by:  Iftikhar Santamaria PA-C           11/28/18 0706  I have reviewed and agree with all the recommendations and orders detailed in this document.     Approved and electronically signed by:  Iftikhar Santamaria PA-C             Admission Information     Date & Time Provider Department Dept. Phone    11/27/2018 Saima Howard MD [place] [address] [facility] [phone]      Your Vitals Were     Blood Pressure Pulse Temperature Respirations Height Weight    107/58 53 99  F (37.2  C) (Oral) 16 1.88 m (6' 2\") 105.5 kg (232 lb 8 oz)    Pulse Oximetry BMI (Body Mass Index)                94% 29.85 kg/m2          [facility] Information     [facility] gives you secure access to your electronic health record. If you see a primary care provider, you can also send messages to your care team and make appointments. If you have questions, please call your primary care clinic.  If you do not have a primary care provider, please call [phone] and they will assist you.        Care EveryWhere ID     This is your Care EveryWhere ID. This could be used by other organizations to access your [facility] medical records  [id number]        Equal Access to Services     ANTHONY DORANTES : Hadii peytno buio Soshalomali, waaxda luqadaha, qaybta kaalmada adeegyadenise, igor mix. So [facility] [phone].    ATENCIÓN: Si habla español, tiene a shrestha disposición servicios gratuitos de asistencia lingüística. Llame al [phone].    We comply with applicable federal civil rights laws and [place] laws. We do not discriminate on the basis of " race, color, national origin, age, disability, sex, sexual orientation, or gender identity.               Review of your medicines      START taking        Dose / Directions    hydrOXYzine 10 MG tablet   Commonly known as:  ATARAX        Dose:  10 mg   Take 1 tablet (10 mg) by mouth every 6 hours as needed for itching   Quantity:  30 tablet   Refills:  0       ondansetron 4 MG ODT tab   Commonly known as:  ZOFRAN-ODT        Dose:  4 mg   Take 1 tablet (4 mg) by mouth every 6 hours as needed for nausea or vomiting   Quantity:  30 tablet   Refills:  0       senna-docusate 8.6-50 MG tablet   Commonly known as:  SENOKOT-S/PERICOLACE        Dose:  1 tablet   Take 1 tablet by mouth 2 times daily   Quantity:  30 tablet   Refills:  0         CONTINUE these medicines which may have CHANGED, or have new prescriptions. If we are uncertain of the size of tablets/capsules you have at home, strength may be listed as something that might have changed.        Dose / Directions    HYDROmorphone 2 MG tablet   Commonly known as:  DILAUDID   This may have changed:    - when to take this  - reasons to take this        Dose:  2 mg   Take 1 tablet (2 mg) by mouth every 4 hours as needed for breakthrough pain   Quantity:  30 tablet   Refills:  0       * torsemide 20 MG tablet   Commonly known as:  DEMADEX   This may have changed:  Another medication with the same name was added. Make sure you understand how and when to take each.        Dose:  20 mg   Take 20 mg by mouth daily   Refills:  0       * torsemide 20 MG tablet   Commonly known as:  DEMADEX   This may have changed:  You were already taking a medication with the same name, and this prescription was added. Make sure you understand how and when to take each.   Used for:  Chronic systolic heart failure (H)        Dose:  20 mg   Take 1 tablet (20 mg) by mouth daily for 3 days   Quantity:  3 tablet   Refills:  0       * Notice:  This list has 2 medication(s) that are the same as other  medications prescribed for you. Read the directions carefully, and ask your doctor or other care provider to review them with you.      CONTINUE these medicines which have NOT CHANGED        Dose / Directions    aspirin 81 MG tablet   Commonly known as:  ASA        Dose:  81 mg   Take 81 mg by mouth daily   Refills:  0       carvedilol 3.125 MG tablet   Commonly known as:  COREG        Dose:  3.125 mg   Take 1 tablet (3.125 mg) by mouth 2 times daily (with meals)   Quantity:  180 tablet   Refills:  1       fluticasone 50 MCG/ACT nasal spray   Commonly known as:  FLONASE        Dose:  1 spray   Spray 1 spray into both nostrils daily   Refills:  0       gabapentin 600 MG tablet   Commonly known as:  NEURONTIN   Used for:  Phantom limb pain (H)        Dose:  600 mg   Take 1 tablet (600 mg) by mouth 3 times daily   Quantity:  270 tablet   Refills:  3       XARELTO PO        Dose:  20 mg   Take 20 mg by mouth daily   Refills:  0            Where to get your medicines      These medications were sent to [facility]  [place] - [address]  [address] [facility] [address], [facility] [address]     Phone:  [phone]     hydrOXYzine 10 MG tablet    ondansetron 4 MG ODT tab    senna-docusate 8.6-50 MG tablet         Some of these will need a paper prescription and others can be bought over the counter. Ask your nurse if you have questions.     Bring a paper prescription for each of these medications     HYDROmorphone 2 MG tablet       You don't need a prescription for these medications     torsemide 20 MG tablet                Protect others around you: Learn how to safely use, store and throw away your medicines at www.disposemymeds.org.        Information about OPIOIDS     PRESCRIPTION OPIOIDS: WHAT YOU NEED TO KNOW   We gave you an opioid (narcotic) pain medicine. It is important to manage your pain, but opioids are not always the best choice. You should first try all the other options your care team gave  you. Take this medicine for as short a time (and as few doses) as possible.    Some activities can increase your pain, such as bandage changes or therapy sessions. It may help to take your pain medicine 30 to 60 minutes before these activities. Reduce your stress by getting enough sleep, working on hobbies you enjoy and practicing relaxation or meditation. Talk to your care team about ways to manage your pain beyond prescription opioids.    These medicines have risks:    DO NOT drive when on new or higher doses of pain medicine. These medicines can affect your alertness and reaction times, and you could be arrested for driving under the influence (DUI). If you need to use opioids long-term, talk to your care team about driving.    DO NOT operate heavy machinery    DO NOT do any other dangerous activities while taking these medicines.    DO NOT drink any alcohol while taking these medicines.     If the opioid prescribed includes acetaminophen, DO NOT take with any other medicines that contain acetaminophen. Read all labels carefully. Look for the word  acetaminophen  or  Tylenol.  Ask your pharmacist if you have questions or are unsure.    You can get addicted to pain medicines, especially if you have a history of addiction (chemical, alcohol or substance dependence). Talk to your care team about ways to reduce this risk.    All opioids tend to cause constipation. Drink plenty of water and eat foods that have a lot of fiber, such as fruits, vegetables, prune juice, apple juice and high-fiber cereal. Take a laxative (Miralax, milk of magnesia, Colace, Senna) if you don t move your bowels at least every other day. Other side effects include upset stomach, sleepiness, dizziness, throwing up, tolerance (needing more of the medicine to have the same effect), physical dependence and slowed breathing.    Store your pills in a secure place, locked if possible. We will not replace any lost or stolen medicine. If you don t  finish your medicine, please throw away (dispose) as directed by your pharmacist. The [place] Pollution [employer] has more information about safe disposal: https://www.pca.[facility].mn.us/living-green/managing-unwanted-medications             Medication List: This is a list of all your medications and when to take them. Check marks below indicate your daily home schedule. Keep this list as a reference.      Medications           Morning Afternoon Evening Bedtime As Needed    aspirin 81 MG tablet   Commonly known as:  ASA   Take 81 mg by mouth daily                                carvedilol 3.125 MG tablet   Commonly known as:  COREG   Take 1 tablet (3.125 mg) by mouth 2 times daily (with meals)   Last time this was given:  3.125 mg on 11/29/2018 12:15 PM                                fluticasone 50 MCG/ACT nasal spray   Commonly known as:  FLONASE   Spray 1 spray into both nostrils daily   Last time this was given:  1 spray on 12/5/2018  9:38 AM                                gabapentin 600 MG tablet   Commonly known as:  NEURONTIN   Take 1 tablet (600 mg) by mouth 3 times daily   Last time this was given:  600 mg on 12/5/2018  9:35 AM                                HYDROmorphone 2 MG tablet   Commonly known as:  DILAUDID   Take 1 tablet (2 mg) by mouth every 4 hours as needed for breakthrough pain   Last time this was given:  2 mg on 12/5/2018  9:34 AM                                hydrOXYzine 10 MG tablet   Commonly known as:  ATARAX   Take 1 tablet (10 mg) by mouth every 6 hours as needed for itching                                ondansetron 4 MG ODT tab   Commonly known as:  ZOFRAN-ODT   Take 1 tablet (4 mg) by mouth every 6 hours as needed for nausea or vomiting   Last time this was given:  4 mg on 11/30/2018  6:39 PM                                senna-docusate 8.6-50 MG tablet   Commonly known as:  SENOKOT-S/PERICOLACE   Take 1 tablet by mouth 2 times daily   Last time this was given:  2 tablets on  12/5/2018  9:35 AM                                * torsemide 20 MG tablet   Commonly known as:  DEMADEX   Take 20 mg by mouth daily   Last time this was given:  20 mg on 12/5/2018  9:36 AM                                * torsemide 20 MG tablet   Commonly known as:  DEMADEX   Take 1 tablet (20 mg) by mouth daily for 3 days   Last time this was given:  20 mg on 12/5/2018  9:36 AM                                XARELTO PO   Take 20 mg by mouth daily   Last time this was given:  20 mg on 12/4/2018  4:34 PM                                * Notice:  This list has 2 medication(s) that are the same as other medications prescribed for you. Read the directions carefully, and ask your doctor or other care provider to review them with you.

## 2018-11-27 NOTE — IP AVS SNAPSHOT
` [address] SPECIALTY UNIT: [phone]            Medication Administration Report for Antonio Ramirez as of 12/05/18 1212   Legend:    Given Hold Not Given Due Canceled Entry Other Actions    Time Time (Time) Time  Time-Action       Inactive    Active    Linked        Medications 11/29/18 11/30/18 12/01/18 12/02/18 12/03/18 12/04/18 12/05/18    acetaminophen (TYLENOL) tablet 650 mg  Dose: 650 mg  Freq: EVERY 4 HOURS PRN Route: PO  PRN Reason: other  PRN Comment: multimodal surgical pain management along with NSAIDS and opioid medication as indicated based on pain control and physical function.  Start: 11/30/18 0000   Admin Instructions: May give first dose 4 hours after last dose scheduled of acetaminophen.  Maximum acetaminophen dose from all sources = 75 mg/kg/day not to exceed 4 grams/day.    Admin. Amount: 2 tablet (2 × 325 mg tablet)  Last Admin: 12/05/18 0934  Dispense Loc: [facility]  POC: Post-procedure          0958 (650 mg)-Given        0934 (650 mg)-Given           benzocaine-menthol (CHLORASEPTIC) 6-10 MG lozenge 1-2 lozenge  Dose: 1-2 lozenge  Freq: EVERY 1 HOUR PRN Route: BU  PRN Reason: sore throat  PRN Comment: sore throat without fever  Start: 11/27/18 1425   Admin. Amount: 1-2 lozenge  Last Admin: 11/27/18 1441  Dispense Loc: [facility]  POC: Post-procedure               bisacodyl (DULCOLAX) EC tablet 5 mg  Dose: 5 mg  Freq: DAILY PRN Route: PO  PRN Reason: constipation  Start: 11/30/18 1613   Admin Instructions: Do not crush or chew. Swallow whole. May titrate 1 tablet each day until response. Maximum of 3 tablets daily. Hold for loose stools. This is the first step of a three step constipation treatment.    Admin. Amount: 1 tablet (1 × 5 mg tablet)  Last Admin: 11/30/18 1711  Dispense Loc: [facility]      1711 (5 mg)-Given               Or  bisacodyl (DULCOLAX) EC tablet 10 mg  Dose: 10 mg  Freq: DAILY PRN Route: PO  PRN Reason: constipation  Start: 11/30/18 3113    Admin Instructions: Do not crush or chew. Swallow whole. May titrate 1 tablet each day until response. Maximum of 3 tablets daily. Hold for loose stools. This is the first step of a three step constipation treatment.    Admin. Amount: 2 tablet (2 × 5 mg tablet)  Dispense Loc:  [facility]                     Or  bisacodyl (DULCOLAX) EC tablet 15 mg  Dose: 15 mg  Freq: DAILY PRN Route: PO  PRN Reason: constipation  Start: 11/30/18 1613   Admin Instructions: Do not crush or chew. Swallow whole. May titrate 1 tablet each day until response. Maximum of 3 tablets daily. Hold for loose stools. This is the first step of a three step constipation treatment.    Admin. Amount: 3 tablet (3 × 5 mg tablet)  Dispense Loc:  [facility]                      bisacodyl (DULCOLAX) Suppository 10 mg  Dose: 10 mg  Freq: DAILY PRN Route: RE  PRN Reason: constipation  Start: 11/30/18 1613   Admin Instructions: Hold for loose stools.  This is the third step of a three step constipation treatment.    Admin. Amount: 1 suppository (1 × 10 mg suppository)  Dispense Loc:  [facility]               fluticasone (FLONASE) 50 MCG/ACT spray 1 spray  Dose: 1 spray  Freq: DAILY Route: BOTH NOSTRIL  Start: 11/28/18 0900   Admin. Amount: 1 spray  Last Admin: 12/05/18 0938  Dispense Loc:  Main Pharmacy  POC: Post-procedure     1237 (1 spray)-Given        1008 (1 spray)-Given        (0927)-Not Given        (0935)-Not Given        (0911)-Not Given        0849 (1 spray)-Given        0938 (1 spray)-Given           folic acid (FOLVITE) tablet 1 mg  Dose: 1 mg  Freq: DAILY Route: PO  Start: 12/01/18 1815   Admin Instructions: If patient is unable to tolerate oral folic acid, an intravenous dose of folic acid should be considered.  Contact provider to change to IV if patient unable to take PO.    Admin. Amount: 1 tablet (1 × 1 mg tablet)  Last Admin: 12/05/18 0935  Dispense Loc:  [facility]       2043 (1 mg)-Given        0934 (1 mg)-Given        0908 (1  mg)-Given        0849 (1 mg)-Given        0935 (1 mg)-Given           gabapentin (NEURONTIN) tablet 600 mg  Dose: 600 mg  Freq: 2 TIMES DAILY Route: PO  Start: 11/30/18 2100   Admin. Amount: 1 tablet (1 × 600 mg tablet)  Last Admin: 12/05/18 0935  Dispense Loc:  [facility]  POC: Post-procedure      2018 (600 mg)-Given        0926 (600 mg)-Given       2043 (600 mg)-Given        0934 (600 mg)-Given       2038 (600 mg)-Given        0908 (600 mg)-Given       2126 (600 mg)-Given        0849 (600 mg)-Given       2206 (600 mg)-Given        0935 (600 mg)-Given       [ ] 2100           HYDROmorphone (DILAUDID) tablet 2 mg  Dose: 2 mg  Freq: EVERY 4 HOURS PRN Route: PO  PRN Reason: other  PRN Comment: pain control or improvement in physical function. Hold dose for analgesic side effects.  Start: 11/29/18 1030   Admin Instructions: Start with the lowest dose. Patient may have one to two mg tablets po every 4 hours prn pain    Admin. Amount: 1 tablet (1 × 2 mg tablet)  Last Admin: 12/05/18 0934  Dispense Loc:  [facility]  POC: Post-procedure       0719 (2 mg)-Given        0934 (2 mg)-Given       1446 (2 mg)-Given        0430 (2 mg)-Given       0911 (2 mg)-Given       1327 (2 mg)-Given       1905 (2 mg)-Given        0055 (2 mg)-Given       0848 (2 mg)-Given       1256 (2 mg)-Given       2206 (2 mg)-Given        0934 (2 mg)-Given           HYDROmorphone (PF) (DILAUDID) injection 0.3-0.5 mg  Dose: 0.3-0.5 mg  Freq: EVERY 2 HOURS PRN Route: IV  PRN Reason: other  PRN Comment: pain control or improvement in physical function. Hold dose for analgesic side effects.  Start: 11/27/18 1425   Admin Instructions: Start at the lowest dose.  May adjust dose by 0.1 mg every 2 hours as needed.   Notify provider to assess for uncontrolled pain or analgesic side effects.  Hold while on IV PCA or with regular IV opioid dosing.  For ordered IV doses 0.1-4 mg give IV Push undiluted. Administer each 2mg over 2-5 minutes.    Admin. Amount: 0.3-0.5  "mg  Last Admin: 11/28/18 0112  Dispense Loc: [facility]  POC: Post-procedure               hydrOXYzine (ATARAX) tablet 10 mg  Dose: 10 mg  Freq: EVERY 6 HOURS PRN Route: PO  PRN Reason: itching  Start: 11/27/18 1425   Admin Instructions: Caution to be used when administering multiple CNS depressing meds within a short time frame.    Admin. Amount: 1 tablet (1 × 10 mg tablet)  Dispense Loc: [facility]  POC: Post-procedure               lidocaine (LMX4) cream  Freq: EVERY 1 HOUR PRN Route: Top  PRN Reason: pain  PRN Comment: with VAD insertion or accessing implanted port.  Start: 11/27/18 1425   Admin Instructions: Do NOT give if patient has a history of allergy to any local anesthetic or any \"bethany\" product.   Apply 30 minutes prior to VAD insertion or port access.  MAX Dose:  2.5 g (  of 5 g tube)    Dispense Loc: Contact Rx for dose  POC: Post-procedure               lidocaine 1 % 1 mL  Dose: 1 mL  Freq: EVERY 1 HOUR PRN Route: OTHER  PRN Comment: mild pain with VAD insertion or accessing implanted port  Start: 11/27/18 1425   Admin Instructions: Do NOT give if patient has a history of allergy to any local anesthetic or any \"bethany\" product. MAX dose 1 mL subcutaneous OR intradermal in divided doses.    Admin. Amount: 1 mL  Dispense Loc: [facility]  Volume: 20 mL  POC: Post-procedure               LORazepam (ATIVAN) tablet 1-2 mg  Dose: 1-2 mg  Freq: EVERY 30 MIN PRN Route: PO  PRN Reason: other  PRN Comment: per CIWA-Ar score  Start: 12/01/18 3477   Admin Instructions: Oral dosing is the preferred route of administration.  Dose according to CIWA-Ar score:    For CIWA-Ar Score LESS THAN OR EQUAL TO 7:  ~ NO LORazepam (ATIVAN) is to be administered and  ~ repeat CIWA-Ar scale in 4 hours and PRN    For CIWA-Ar Score 8-12:   ~ give LORazepam (ATIVAN) 1 mg PO or IV and  ~ repeat CIWA-Ar scale in 1 hour     For CIWA-Ar Score 13-15:  ~ give LORazepam (ATIVAN) 2 mg PO or IV   ~ and repeat CIWA-Ar scale in 1 hour    For " CIWA-Ar Score GREATER THAN OR EQUAL TO 16:   ~ give LORazepam (ATIVAN) 2 mg PO or IV and   ~ repeat CIWA-Ar scale in 30 minutes    Doses should be withheld for nystagmus, sedation, ataxia, dysarthria or respiratory rate less than 12. If dose is being held more than once, provider must be notified.    Admin. Amount: 1-2 tablet (1-2 × 1 mg tablet)  Dispense Loc:  [facility]              Or  LORazepam (ATIVAN) injection 1-2 mg  Dose: 1-2 mg  Freq: EVERY 30 MIN PRN Route: IV  PRN Reason: other  PRN Comment: per CIWA-Ar score  Start: 12/01/18 1807   Admin Instructions: Oral dosing is the preferred route of administration.    Dose according to CIWA-Ar Score:    For CIWA-Ar Score LESS THAN OR EQUAL TO 7:   ~ NO LORazepam (ATIVAN) is to be administered  and  ~ repeat CIWA-Ar scale in 4 hours and PRN    For CIWA-Ar Score 8-12:  ~ give LORazepam (ATIVAN) 1 mg PO or IV and  ~ repeat CIWA-AR scale in 1 hour     For CIWA-Ar Score 13-15:  ~ give LORazepam (ATIVAN) 2 mg PO or IV and   ~ repeat CIWA-Ar scale in 1 hour    For CIWA-Ar Score GREATER THAN OR EQUAL TO 16:  ~ give LORazepam (ATIVAN) 2 mg PO or IV and  ~ repeat CIWA-Ar scale in 30 minutes    Doses should be withheld for nystagmus, sedation, ataxia, dysarthria or respiratory rate less than 12. If dose is being held more than once, provider must be notified.  For IV PUSH: Dilute with equal volume of NS. For ordered IV doses 0.1-4 mg give IV Push. Administer each 2mg over 1-5 minutes.    Admin. Amount: 1-2 mg = 0.5-1 mL Conc: 2 mg/mL  Dispense Loc:  [facility]  Volume: 1 mL               melatonin tablet 3 mg  Dose: 3 mg  Freq: AT BEDTIME PRN Route: PO  PRN Reason: sleep  Start: 11/28/18 1037   Admin. Amount: 1 tablet (1 × 3 mg tablet)  Last Admin: 11/28/18 2105  Dispense Loc:  [facility]               multivitamin w/minerals (THERA-VIT-M) tablet 1 tablet  Dose: 1 tablet  Freq: DAILY Route: PO  Start: 12/01/18 1815   Admin Instructions: If patient is unable to tolerate oral  multivitamins an intravenous dose of multivitamins should be considered.  Contact provider to change to IV if patient unable to take PO.    Admin. Amount: 1 tablet  Last Admin: 12/05/18 0936  Dispense Loc:  [facility]       2043 (1 tablet)-Given        0934 (1 tablet)-Given        0908 (1 tablet)-Given        0849 (1 tablet)-Given        0936 (1 tablet)-Given           naloxone (NARCAN) injection 0.1-0.4 mg  Dose: 0.1-0.4 mg  Freq: EVERY 2 MIN PRN Route: IV  PRN Reason: opioid reversal  Start: 11/27/18 1425   Admin Instructions: For respiratory rate LESS than or EQUAL to 8.  Partial reversal dose:  0.1 mg titrated q 2 minutes for Analgesia Side Effects Monitoring Sedation Level of 3 (frequently drowsy, arousable, drifts to sleep during conversation).Full reversal dose:  0.4 mg bolus for Analgesia Side Effects Monitoring Sedation Level of 4 (somnolent, minimal or no response to stimulation).  For ordered IV doses 0.1-2mg give IVP. Give each 0.4mg over 15 seconds in emergency situations. For non-emergent situations further dilute in 9mL of NS to facilitate titration of response.    Admin. Amount: 0.1-0.4 mg = 0.25-1 mL Conc: 0.4 mg/mL  Dispense Loc:  [facility]  Volume: 1 mL  POC: Post-procedure               ondansetron (ZOFRAN-ODT) ODT tab 4 mg  Dose: 4 mg  Freq: EVERY 6 HOURS PRN Route: PO  PRN Reasons: nausea,vomiting  Start: 11/27/18 1425   Admin Instructions: This is Step 1 of nausea and vomiting management.  If nausea not resolved in 15 minutes, go to Step 2 prochlorperazine (COMPAZINE). Do not push through foil backing. Peel back foil and gently remove. Place on tongue immediately. Administration with liquid unnecessary  With dry hands, peel back foil backing and gently remove tablet; do not push oral disintegrating tablet through foil backing; administer immediately on tongue and oral disintegrating tablet dissolves in seconds; then swallow with saliva; liquid not required.    Admin. Amount: 1 tablet (1 × 4 mg  tablet)  Last Admin: 11/30/18 1839  Dispense Loc:  [facility]  POC: Post-procedure      1839 (4 mg)-Given               Or  ondansetron (ZOFRAN) injection 4 mg  Dose: 4 mg  Freq: EVERY 6 HOURS PRN Route: IV  PRN Reasons: nausea,vomiting  Start: 11/27/18 1425   Admin Instructions: This is Step 1 of nausea and vomiting management.  If nausea not resolved in 15 minutes, go to Step 2 prochlorperazine (COMPAZINE).  Irritant. For ordered IV doses 0.1-4 mg, give IV Push undiluted over 2-5 minutes.    Admin. Amount: 4 mg = 2 mL Conc: 4 mg/2 mL  Dispense Loc:  [facility]  Infused Over: 2-5 Minutes  Volume: 2 mL  POC: Post-procedure                      polyethylene glycol (MIRALAX/GLYCOLAX) Packet 17 g  Dose: 17 g  Freq: DAILY PRN Route: PO  PRN Reason: constipation  Start: 11/30/18 1613   Admin Instructions: Give in 8oz of  water, juice, or soda. Hold for loose stools.  This is the second step of a three step constipation treatment.  1 Packet = 17 grams. Mixed prescribed dose in 8 ounces of water. Follow with 8 oz. of water.    Admin. Amount: 17 g  Dispense Loc:  [facility]               rivaroxaban ANTICOAGULANT (XARELTO) tablet 20 mg  Dose: 20 mg  Freq: DAILY WITH SUPPER Route: PO  Start: 12/02/18 1700   Admin Instructions: Dose adjusted per renal dosing policy.  Estimated CrCl > 50 ml/min Not recommended if CrCl less than 30 mL/min. Patient already has this ordered under his med rec form and it isn't supposed to start until tomorrow.    Admin. Amount: 2 tablet (2 × 10 mg tablet)  Last Admin: 12/04/18 1634  Dispense Loc:  [facility]  POC: Post-procedure        1718 (20 mg)-Given        1632 (20 mg)-Given        1634 (20 mg)-Given        [ ] 1700           senna-docusate (SENOKOT-S/PERICOLACE) 8.6-50 MG per tablet 1 tablet  Dose: 1 tablet  Freq: 2 TIMES DAILY Route: PO  Start: 11/27/18 1425   Admin Instructions: If no bowel movement in 24 hours, increase to 2 tablets PO.  Hold for loose stools.    Admin. Amount: 1 tablet  Last  Admin: 12/04/18 0849  Dispense Loc:  [facility]  POC: Post-procedure                    (1009)-Not Given                                     2038 (1 tablet)-Given               2126 (1 tablet)-Given        0849 (1 tablet)-Given                      [ ] 2100          Or  senna-docusate (SENOKOT-S/PERICOLACE) 8.6-50 MG per tablet 2 tablet  Dose: 2 tablet  Freq: 2 TIMES DAILY Route: PO  Start: 11/27/18 1425   Admin Instructions: Hold for loose stools.    Admin. Amount: 2 tablet  Last Admin: 12/05/18 0935  Dispense Loc:  [facility]  POC: Post-procedure     1219 (2 tablet)-Given       2012 (2 tablet)-Given               2018 (2 tablet)-Given        0926 (2 tablet)-Given       2042 (2 tablet)-Given        0934 (2 tablet)-Given               0908 (2 tablet)-Given                      2206 (2 tablet)-Given        0935 (2 tablet)-Given       [ ] 2100           sodium chloride (PF) 0.9% PF flush 3 mL  Dose: 3 mL  Freq: EVERY 8 HOURS Route: IK  Start: 11/27/18 1430   Admin Instructions: And Q1H PRN, to lock peripheral IV dormant line.    Admin. Amount: 3 mL  Last Admin: 12/05/18 0940  Dispense Loc: [facility]  Volume: 3 mL  POC: Post-procedure   Current Line: Peripheral IV 11/29/18 Right    (0640)-Not Given       (1412)-Not Given       2203 (3 mL)-Given        0547 (3 mL)-Given       (1416)-Not Given       2126 (3 mL)-Given               0550 (3 mL)-Given       0926 (3 mL)-Given       (1630)-Not Given        (0148)-Not Given       0935 (3 mL)-Given       1719 (3 mL)-Given        (0431)-Not Given       0908 (3 mL)-Given       1633 (3 mL)-Given        0056 (3 mL)-Given       0850 (3 mL)-Given       1634 (3 mL)-Given        (0126)-Not Given       0940 (3 mL)-Given       [ ] 1700           sodium chloride (PF) 0.9% PF flush 3 mL  Dose: 3 mL  Freq: EVERY 1 HOUR PRN Route: IK  PRN Reason: line flush  PRN Comment: for peripheral IV flush post IV meds  Start: 11/27/18 1425   Admin. Amount: 3 mL  Dispense Loc: [facility]  Stock  Volume: 3 mL  POC: Post-procedure               torsemide (DEMADEX) tablet 20 mg  Dose: 20 mg  Freq: DAILY AT 4 Route: PO  Start: 18 1600   End: 18 1559   Admin. Amount: 1 tablet (1 × 20 mg tablet)  Last Admin: 18 1633  Dispense Loc:  [facility]  Administrations Remainin         1632 (20 mg)-Given        1633 (20 mg)-Given        [ ] 1600           torsemide (DEMADEX) tablet 20 mg  Dose: 20 mg  Freq: DAILY Route: PO  Start: 18 0800   Admin. Amount: 1 tablet (1 × 20 mg tablet)  Last Admin: 18 0936  Dispense Loc:  [facility]  POC: Post-procedure          0849 (20 mg)-Given        0936 (20 mg)-Given          Completed Medications  Medications 18         Dose: 300 mg  Freq: DAILY Route: IV  Start: 18 1630   End: 18 0939   Admin. Amount: 300 mg  Last Admin: 18 0939  Dispense Loc:  Main Pharmacy  Administrations Remainin  Volume: 265 mL   Mixture Administration Information:   Medication Type Amount   iron sucrose 20 MG/ML SOLN Medications 300 mg   sodium chloride 0.9 % SOLN Base 250 mL           Current Line: Peripheral IV 18 Right         1809 (300 mg)-New Bag        0939 (300 mg)-New Bag             Dose: 100 mg  Freq: DAILY Route: PO  Start: 18 1815   End: 18 0934   Admin Instructions: Administer first dose as soon as order set is initiated unless given in ED.   If patient is unable to tolerate oral thiamine, an intravenous dose of thiamine should be considered.  Contact provider to change to IV if patient unable to take PO.    Admin. Amount: 1 tablet (1 × 100 mg tablet)  Last Admin: 18 0934  Dispense Loc:  [facility]  Administrations Remainin       2042 (100 mg)-Given        0934 (100 mg)-Given        0908 (100 mg)-Given        0849 (100 mg)-Given        0934 (100 mg)-Given          Discontinued Medications  Medications 1118  12/04/18 12/05/18         Dose: 20 mg  Freq: DAILY AT 4 Route: PO  Start: 12/03/18 1600   End: 12/03/18 1102   Admin. Amount: 1 tablet (1 × 20 mg tablet)         1102-Med Discontinued           Dose: 20 mg  Freq: DAILY Route: PO  Start: 12/02/18 1030   End: 12/03/18 1102   Admin. Amount: 1 tablet (1 × 20 mg tablet)  Last Admin: 12/03/18 0908  Dispense Loc:  [facility]  POC: Post-procedure        1307 (20 mg)-Given        0908 (20 mg)-Given       1102-Med Discontinued

## 2018-11-27 NOTE — IP AVS SNAPSHOT
"    [place] [address] [facility]: [phone]                                              INTERAGENCY TRANSFER FORM - LAB / IMAGING / EKG / EMG RESULTS   2018                    Hospital Admission Date: 2018  GALILEA LUGO   : 1945  Sex: Male        Attending Provider: Saima Howard MD     Allergies:  Ciprofloxacin, Hmg-coa-r Inhibitors    Infection:  None   Service:  SURGERY    Ht:  1.88 m (6' 2\")   Wt:  105.5 kg (232 lb 8 oz)   Admission Wt:  105.5 kg (232 lb 8 oz)    BMI:  29.85 kg/m 2   BSA:  2.35 m 2            Patient PCP Information     Provider PCP Type    Main Hardy MD General         Lab Results - 3 Days      Platelet count [[id number]] (Abnormal)  Resulted: 18 0734, Result status: Final result    Ordering provider: Lynne Mancuso RPH  18 0002 Resulting lab: [facility]    Specimen Information    Type Source Collected On   Blood  18 0729          Components       Value Reference Range Flag Lab   Platelet Count 125 150 - 450 10e9/L L FrStHsLb            Hemoglobin [[id number]] (Abnormal)  Resulted: 18 0734, Result status: Final result    Ordering provider: Solomon Tucker MD  18 0002 Resulting lab: [facility]    Specimen Information    Type Source Collected On   Blood  18 0729          Components       Value Reference Range Flag Lab   Hemoglobin 8.8 13.3 - 17.7 g/dL L FrStHsLb            Hemoglobin [[id number]] (Abnormal)  Resulted: 18 1741, Result status: Final result    Ordering provider: Solomon Tucker MD  18 1624 Resulting lab: [facility]    Specimen Information    Type Source Collected On   Blood  18 1714          Components       Value Reference Range Flag Lab   Hemoglobin 8.0 13.3 - 17.7 g/dL L FrStHsLb            Creatinine [[id number]] (Abnormal)  Resulted: 18 0811, Result status: Final result    Ordering provider: Sully Vilchis, " [facility]  12/04/18 0000 Resulting lab: [facility]    Specimen Information    Type Source Collected On   Blood  12/04/18 0744          Components       Value Reference Range Flag Lab   Creatinine 1.23 0.66 - 1.25 mg/dL  FrStHsLb   GFR Estimate 57 >60 mL/min/1.7m2 L FrStHsLb   Comment:  Non  GFR Calc   GFR Estimate If Black 69 >60 mL/min/1.7m2  FrStHsLb   Comment:   GFR Calc            Creatinine [[id number]] (Abnormal)  Resulted: 12/03/18 0751, Result status: Final result    Ordering provider: Sully Vilchis, [facility]  12/03/18 0000 Resulting lab: [facility]    Specimen Information    Type Source Collected On   Blood  12/03/18 0714          Components       Value Reference Range Flag Lab   Creatinine 1.29 0.66 - 1.25 mg/dL H FrStHsLb   GFR Estimate 54 >60 mL/min/1.7m2 L FrStHsLb   Comment:  Non  GFR Calc   GFR Estimate If Black 66 >60 mL/min/1.7m2  FrStHsLb   Comment:   GFR Calc            Basic metabolic panel [[id number]] (Abnormal)  Resulted: 12/02/18 0715, Result status: Final result    Ordering provider: Anthony Baker MD  12/02/18 0000 Resulting lab: [facility]    Specimen Information    Type Source Collected On   Blood  12/02/18 0652          Components       Value Reference Range Flag Lab   Sodium 133 133 - 144 mmol/L  FrStHsLb   Potassium 4.5 3.4 - 5.3 mmol/L  FrStHsLb   Chloride 102 94 - 109 mmol/L  FrStHsLb   Carbon Dioxide 26 20 - 32 mmol/L  FrStHsLb   Anion Gap 5 3 - 14 mmol/L  FrStHsLb   Glucose 97 70 - 99 mg/dL  FrStHsLb   Urea Nitrogen 36 7 - 30 mg/dL H FrStHsLb   Creatinine 1.61 0.66 - 1.25 mg/dL H FrStHsLb   GFR Estimate 42 >60 mL/min/1.7m2 L FrStHsLb   Comment:  Non  GFR Calc   GFR Estimate If Black 51 >60 mL/min/1.7m2 L FrStHsLb   Comment:  African American GFR Calc   Calcium 8.5 8.5 - 10.1 mg/dL  FrStHsLb            CBC with platelets [[id number]] (Abnormal)  Resulted:  12/02/18 0659, Result status: Final result    Ordering provider: Anthony Baker MD  12/02/18 0000 Resulting lab: [facility]    Specimen Information    Type Source Collected On   Blood  12/02/18 0652          Components       Value Reference Range Flag Lab   WBC 6.2 4.0 - 11.0 10e9/L  FrStHsLb   RBC Count 2.59 4.4 - 5.9 10e12/L L FrStHsLb   Hemoglobin 8.1 13.3 - 17.7 g/dL L FrStHsLb   Hematocrit 23.9 40.0 - 53.0 % L FrStHsLb   MCV 92 78 - 100 fl  FrStHsLb   MCH 31.3 26.5 - 33.0 pg  FrStHsLb   MCHC 33.9 31.5 - 36.5 g/dL  FrStHsLb   RDW 16.6 10.0 - 15.0 % H FrStHsLb   Platelet Count 112 150 - 450 10e9/L L FrStHsLb            Testing Performed By     Lab - Abbreviation Name Director Address Valid Date Range    14 - FrStHsLb [facility] Unknown [address] [place] [address] 05/08/15 1057 - Present            Unresulted Labs (24h ago through future)    Start       Ordered    Unscheduled  Hemoglobin  CONDITIONAL X 2,   Routine     Comments:  IF morning Hemoglobin result is LESS than 8.0 on POD 1 and/or POD 2, release order and recheck Hemoglobin in the evening at 1700.  Notify Provider if second Hemoglobin result is LESS than 7.5.    11/27/18 1426         Imaging Results - 3 Days      US Lower Extremity Venous Duplex Right Port [[id number]]  Resulted: 12/03/18 1239, Result status: Final result    Ordering provider: Anthony Baker MD  12/03/18 1100 Resulted by: Elpidio Ortega MD    Performed: 12/03/18 1149 - 12/03/18 1202 Resulting lab: RADIOLOGY RESULTS    Narrative:       ULTRASOUND LOWER EXTREMITY VENOUS DUPLEX RIGHT PORTABLE 12/3/2018  12:02 PM     HISTORY: Swelling post op.      FINDINGS: The deep veins in the right lower extremity are compressible  throughout. The deep veins demonstrate normal venous augmentation,  waveforms and color Doppler flow. No evidence of superficial  thrombophlebitis.      Impression:       IMPRESSION: No evidence of DVT.    ELPIDIO ORTEGA MD       Testing Performed By     Lab - Abbreviation Name Director Address Valid Date Range    104 - Rad Rslts RADIOLOGY RESULTS Unknown Unknown 05 1553 - Present               ECG/EMG Results      Echocardiogram Complete [[id number]]  Resulted: 18 1341, Result status: Edited Result - FINAL    Ordering provider: Shirley Arvizu APRN CNP  18 0905 Resulted by: Ran Medina MD    Performed: 18 1442 - 18 1442 Resulting lab: RADIOLOGY RESULTS    Narrative:       [id number]  [id number]  [id number]  [id number]^CHEPE^SHIRLEY^WILMER           [facility]  Echocardiography Laboratory  [address] [place] [address]        Name: GALILEA LUGO  MRN: [id number]  : 1945  Study Date: 2018 01:41 PM  Age: 73 yrs  Gender: Male  Patient Location: [facility]  Reason For Study: SyncopeAortic Valve DisorderMitral Regurgitation  Ordering Physician: SHIRLEY ARVIZU  Referring Physician: April  Performed By: Maren Lan RDCS     BSA: 2.2 m2  Height: 73 in  Weight: 200 lb  HR: 71  BP: 115/60 mmHg  _____________________________________________________________________________  __        Procedure  Complete Portable Echo Adult.  _____________________________________________________________________________  __        Interpretation Summary     There are regional wall motion abnormalities as specified.  Moderate valvular aortic stenosis.  (Measurements could be erroneous as an average of 5 measurements should have  been made as the patient is in atrial fibrillation.  This study only became available to read at 5:15 PM  The rhythm was atrial fibrillation.  The ascending aorta is Moderately dilated.  The visual ejection fraction is estimated at 35-40%.  Left ventricular systolic function is mild to moderately reduced.  The ascending aorta is significantly larger than before. Pulmonary prersures  are less than before. The study was technically  difficult.  _____________________________________________________________________________  __        Left Ventricle  The left ventricle is normal in size. There is borderline concentric left  ventricular hypertrophy. Diastolic Doppler findings (E/E' ratio and/or other  parameters) suggest left ventricular filling pressures are indeterminate. The  visual ejection fraction is estimated at 35-40%. Left ventricular systolic  function is mild to moderately reduced. Flattened septum is consistent with RV  pressure/volume overload. There is apical akinesis. Mid to distal anterior  wall appears akinetic. Mid to distal anteroseptal wall appears akinetic.  Distal inferior wall akinesis. Mid to distal inferoseptal akinesis. There are  regional wall motion abnormalities as specified.     Right Ventricle  The right ventricle is mildly dilated. Right ventricular function cannot be  assessed due to poor image quality.     Atria  There is severe biatrial enlargement. There is no color Doppler evidence of an  atrial shunt.     Mitral Valve  There is mild to moderate (1-2+) mitral regurgitation.        Tricuspid Valve  There is moderate (2+) tricuspid regurgitation. The right ventricular systolic  pressure is approximated at 56.0 mmHg plus the right atrial pressure.     Aortic Valve  The aortic valve is trileaflet. Thickened aortic valve leaflets. No aortic  regurgitation is present. The calculated aortic valve are is 1.2 cm^2. The  peak AoV pressure gradient is 58.0 mmHg. The mean AoV pressure gradient is  25.1 mmHg. Moderate valvular aortic stenosis. (Measurements could be erroneous  as an average of 5 measurements should have been made as the patient is in  atrial fibrillation.     Pulmonic Valve  There is trace pulmonic valvular regurgitation.     Vessels  Sinuses of valsalva not measured. The ascending aorta is Moderately dilated.  Dilated inferior vena cava.     Pericardium  There is no pericardial effusion.        Rhythm  The  rhythm was atrial fibrillation.  _____________________________________________________________________________  __  MMode/2D Measurements & Calculations  IVSd: 1.2 cm     LVIDd: 5.5 cm  LVIDs: 3.9 cm  LVPWd: 1.1 cm  FS: 29.4 %  LV mass(C)d: 256.8 grams  LV mass(C)dI: 119.3 grams/m2  LA dimension: 5.3 cm  asc Aorta Diam: 4.7 cm  LVOT diam: 2.3 cm  LVOT area: 4.1 cm2  RWT: 0.42        Doppler Measurements & Calculations  MV E max canelo: 142.0 cm/sec  MV dec time: 0.27 sec  Ao V2 max: 380.9 cm/sec  Ao max P.0 mmHg  Ao V2 mean: 230.6 cm/sec  Ao mean P.1 mmHg  Ao V2 VTI: 69.0 cm  DIVINA(I,D): 1.2 cm2  DIVINA(V,D): 1.1 cm2  LV V1 max P.0 mmHg  LV V1 max: 100.4 cm/sec  LV V1 VTI: 19.7 cm  SV(LVOT): 80.0 ml  SI(LVOT): 37.2 ml/m2  PA acc time: 0.11 sec  TR max canelo: 372.4 cm/sec  TR max P.0 mmHg     AV Canelo Ratio (DI): 0.26  DIVINA Index (cm2/m2): 0.54  Lateral E/e': 12.3           _____________________________________________________________________________  __           Report approved by: Telma Pat 2018 05:36 PM       1    Type Source Collected On     18 1341          View Image (below)              Encounter-Level Documents:     There are no encounter-level documents.      Order-Level Documents:     There are no order-level documents.

## 2018-11-27 NOTE — OR NURSING
1210hr - [facility] rounded on pt, pt remains drowsy w/soft stable SBP 90's.   V.O:Give 5% Albumin dose x1 now per [facility].  1215hr - [facility] re-rounded on pt - Collected Hgb lab result still pending.  1230hr - [facility] updated on PACU collected Hgb lab result = 10.  1315hr - [facility] updated on pt status. SBP fluctuting 's. Low UOP.  T.O: Give another dose of Albumin for low UOP.  1320hr - [facility] now rounding on pt - pt sleeping on/off post 0.3mg Dilaudid given for c/o surgical pain. SBP now 100's. UOP per lainez remains minimal and dark - Albumin 250mls now in progress to boost UOP.  Okay for pt to transfer to floor post Albumin gtt per [facility].

## 2018-11-28 ENCOUNTER — APPOINTMENT (OUTPATIENT)
Dept: OCCUPATIONAL THERAPY | Facility: CLINIC | Age: 73
DRG: 470 | End: 2018-11-28
Attending: ORTHOPAEDIC SURGERY
Payer: MEDICARE

## 2018-11-28 ENCOUNTER — APPOINTMENT (OUTPATIENT)
Dept: PHYSICAL THERAPY | Facility: CLINIC | Age: 73
DRG: 470 | End: 2018-11-28
Attending: ORTHOPAEDIC SURGERY
Payer: MEDICARE

## 2018-11-28 LAB
GLUCOSE SERPL-MCNC: NORMAL MG/DL (ref 70–99)
HGB BLD-MCNC: 7 G/DL (ref 13.3–17.7)
HGB BLD-MCNC: NORMAL G/DL (ref 13.3–17.7)

## 2018-11-28 PROCEDURE — 25000128 H RX IP 250 OP 636: Performed by: ORTHOPAEDIC SURGERY

## 2018-11-28 PROCEDURE — A9270 NON-COVERED ITEM OR SERVICE: HCPCS | Performed by: INTERNAL MEDICINE

## 2018-11-28 PROCEDURE — 97116 GAIT TRAINING THERAPY: CPT | Mod: GP | Performed by: PHYSICAL THERAPIST

## 2018-11-28 PROCEDURE — A9270 NON-COVERED ITEM OR SERVICE: HCPCS | Performed by: ORTHOPAEDIC SURGERY

## 2018-11-28 PROCEDURE — G0463 HOSPITAL OUTPT CLINIC VISIT: HCPCS

## 2018-11-28 PROCEDURE — 25000132 ZZH RX MED GY IP 250 OP 250 PS 637: Performed by: INTERNAL MEDICINE

## 2018-11-28 PROCEDURE — 97535 SELF CARE MNGMENT TRAINING: CPT | Mod: GO

## 2018-11-28 PROCEDURE — 25000132 ZZH RX MED GY IP 250 OP 250 PS 637: Performed by: ORTHOPAEDIC SURGERY

## 2018-11-28 PROCEDURE — 97530 THERAPEUTIC ACTIVITIES: CPT | Mod: GP | Performed by: PHYSICAL THERAPIST

## 2018-11-28 PROCEDURE — 40000133 ZZH STATISTIC OT WARD VISIT

## 2018-11-28 PROCEDURE — 99232 SBSQ HOSP IP/OBS MODERATE 35: CPT | Performed by: INTERNAL MEDICINE

## 2018-11-28 PROCEDURE — 97165 OT EVAL LOW COMPLEX 30 MIN: CPT | Mod: GO

## 2018-11-28 PROCEDURE — 40000193 ZZH STATISTIC PT WARD VISIT: Performed by: PHYSICAL THERAPIST

## 2018-11-28 PROCEDURE — 99207 ZZC CDG-MDM COMPONENT: MEETS MODERATE - UP CODED: CPT | Performed by: INTERNAL MEDICINE

## 2018-11-28 PROCEDURE — 97530 THERAPEUTIC ACTIVITIES: CPT | Mod: GO

## 2018-11-28 PROCEDURE — 85018 HEMOGLOBIN: CPT | Performed by: ORTHOPAEDIC SURGERY

## 2018-11-28 PROCEDURE — 12000007 ZZH R&B INTERMEDIATE

## 2018-11-28 PROCEDURE — 36415 COLL VENOUS BLD VENIPUNCTURE: CPT | Performed by: ORTHOPAEDIC SURGERY

## 2018-11-28 PROCEDURE — 97110 THERAPEUTIC EXERCISES: CPT | Mod: GP | Performed by: PHYSICAL THERAPIST

## 2018-11-28 RX ORDER — HYDROMORPHONE HYDROCHLORIDE 2 MG/1
2-4 TABLET ORAL EVERY 4 HOURS PRN
Qty: 30 TABLET | Refills: 0 | Status: SHIPPED | OUTPATIENT
Start: 2018-11-28 | End: 2018-11-29

## 2018-11-28 RX ORDER — LANOLIN ALCOHOL/MO/W.PET/CERES
3 CREAM (GRAM) TOPICAL
Status: DISCONTINUED | OUTPATIENT
Start: 2018-11-28 | End: 2018-12-05 | Stop reason: HOSPADM

## 2018-11-28 RX ORDER — ONDANSETRON 4 MG/1
4 TABLET, ORALLY DISINTEGRATING ORAL EVERY 6 HOURS PRN
Qty: 30 TABLET | Refills: 0 | Status: SHIPPED | OUTPATIENT
Start: 2018-11-28 | End: 2018-12-20

## 2018-11-28 RX ORDER — AMOXICILLIN 250 MG
1 CAPSULE ORAL 2 TIMES DAILY
Qty: 30 TABLET | Refills: 0 | Status: SHIPPED | OUTPATIENT
Start: 2018-11-28 | End: 2018-12-31

## 2018-11-28 RX ORDER — HYDROXYZINE HYDROCHLORIDE 10 MG/1
10 TABLET, FILM COATED ORAL EVERY 6 HOURS PRN
Qty: 30 TABLET | Refills: 0 | Status: SHIPPED | OUTPATIENT
Start: 2018-11-28 | End: 2018-12-11

## 2018-11-28 RX ADMIN — GABAPENTIN 600 MG: 600 TABLET, FILM COATED ORAL at 17:06

## 2018-11-28 RX ADMIN — HYDROMORPHONE HYDROCHLORIDE 4 MG: 2 TABLET ORAL at 11:27

## 2018-11-28 RX ADMIN — CARVEDILOL 3.12 MG: 3.12 TABLET, FILM COATED ORAL at 08:42

## 2018-11-28 RX ADMIN — SENNOSIDES AND DOCUSATE SODIUM 2 TABLET: 8.6; 5 TABLET ORAL at 21:07

## 2018-11-28 RX ADMIN — SENNOSIDES AND DOCUSATE SODIUM 1 TABLET: 8.6; 5 TABLET ORAL at 08:42

## 2018-11-28 RX ADMIN — GABAPENTIN 600 MG: 600 TABLET, FILM COATED ORAL at 08:42

## 2018-11-28 RX ADMIN — Medication 0.5 MG: at 01:12

## 2018-11-28 RX ADMIN — GABAPENTIN 600 MG: 600 TABLET, FILM COATED ORAL at 21:07

## 2018-11-28 RX ADMIN — CEFAZOLIN SODIUM 2 G: 2 INJECTION, SOLUTION INTRAVENOUS at 01:13

## 2018-11-28 RX ADMIN — FLUTICASONE PROPIONATE 1 SPRAY: 50 SPRAY, METERED NASAL at 09:17

## 2018-11-28 RX ADMIN — HYDROMORPHONE HYDROCHLORIDE 4 MG: 2 TABLET ORAL at 17:06

## 2018-11-28 RX ADMIN — MELATONIN 3 MG: 3 TAB ORAL at 21:08

## 2018-11-28 RX ADMIN — RIVAROXABAN 20 MG: 10 TABLET, FILM COATED ORAL at 21:07

## 2018-11-28 RX ADMIN — HYDROMORPHONE HYDROCHLORIDE 4 MG: 2 TABLET ORAL at 06:55

## 2018-11-28 RX ADMIN — HYDROMORPHONE HYDROCHLORIDE 4 MG: 2 TABLET ORAL at 21:08

## 2018-11-28 ASSESSMENT — ACTIVITIES OF DAILY LIVING (ADL)
ADLS_ACUITY_SCORE: 11
ADLS_ACUITY_SCORE: 12
ADLS_ACUITY_SCORE: 12
ADLS_ACUITY_SCORE: 11

## 2018-11-28 NOTE — ANESTHESIA POSTPROCEDURE EVALUATION
Patient: Antonio Ramirez    Procedure(s):  RIGHT TOTAL HIP ARTHROPLASTY    Diagnosis:DJD RIGHT HIP  Diagnosis Additional Information: No value filed.    Anesthesia Type:  General, ETT    Note:  Anesthesia Post Evaluation    Patient location during evaluation: Bedside  Patient participation: Able to fully participate in evaluation  Level of consciousness: awake and alert  Pain management: adequate  Airway patency: patent  Cardiovascular status: acceptable  Respiratory status: acceptable  Hydration status: acceptable  PONV: none             Last vitals:  Vitals:    11/28/18 0139 11/28/18 0320 11/28/18 0655   BP:  92/54    Pulse:      Resp: 16 16 15   Temp:  36.2  C (97.2  F)    SpO2:  100%          Electronically Signed By: Sonido Latif MD  November 28, 2018  7:12 AM

## 2018-11-28 NOTE — PLAN OF CARE
Problem: Patient Care Overview  Goal: Plan of Care/Patient Progress Review  Discharge Planner PT   Patient plan for discharge: Disch to home on Friday, 11/30/18, with help of his wife per [facility] POC (11/29/18).  Current status: PT:  Patient reports R hip pain at 7/10 at start of session and 4/10 at end of session.  Needs Pepper and vc for exercise, bed mobility, transfers, and gait with FWW, and L BK prosthesis, 2' from EOB to w/c, WBAT R,  c/o mild dizziness.  A-L TOBI precautions reviewed with patient showing understanding.  Barriers to return to prior living situation: Postop pain, edema, weakness.  Recommendations for discharge: Disch to home with help of his wife and OPPT, per [facility] POC on 11/29/18 or 11/30/18.  Rationale for recommendations: Will continue to need skilled PT for recovery of functional mobility, independence and safety for negotiation of chosen residence.       Entered by: Ruben Hewitt 11/28/2018 10:43 AM

## 2018-11-28 NOTE — PROGRESS NOTES
11/28/18 1429   Quick Adds   Type of Visit Initial Occupational Therapy Evaluation   Living Environment   Lives With spouse   Living Arrangements apartment   Home Accessibility no concerns   Transportation Available car;family or friend will provide   Living Environment Comment walk in shower with shower chair and grab bars   Self-Care   Equipment Currently Used at Home cane, straight;wheelchair, manual;walker, rolling;shower chair;grab bar   Functional Level Prior   Ambulation 1-->assistive equipment   Transferring 1-->assistive equipment   Toileting 0-->independent   Bathing 1-->assistive equipment   Dressing 0-->independent   Eating 0-->independent   Communication 0-->understands/communicates without difficulty   Swallowing 0-->swallows foods/liquids without difficulty   Cognition 0 - no cognition issues reported   Fall history within last six months yes   Number of times patient has fallen within last six months 1   Which of the above functional risks had a recent onset or change? ambulation;transferring   Prior Functional Level Comment uses SEC in apartment    General Information   Onset of Illness/Injury or Date of Surgery - Date 11/27/18   Referring Physician Lee   Patient/Family Goals Statement return home friday    Additional Occupational Profile Info/Pertinent History of Current Problem R TOBI    Precautions/Limitations fall precautions;right hip precautions   Weight-Bearing Status - RLE weight-bearing as tolerated   General Info Comments on 2 L o2 at eval not on at baseline   Cognitive Status Examination   Orientation orientation to person, place and time   Level of Consciousness alert   Able to Follow Commands WNL/WFL   Personal Safety (Cognitive) WNL/WFL   Memory intact   Visual Perception   Visual Perception No deficits were identified   Sensory Examination   Sensory Quick Adds No deficits were identified   Pain Assessment   Patient Currently in Pain Yes, see Vital Sign flowsheet  (3/10)    Integumentary/Edema   Integumentary/Edema no deficits were identifed   Range of Motion (ROM)   ROM Quick Adds No deficits were identified   Strength   Manual Muscle Testing Quick Adds No deficits were identified   Mobility   Bed Mobility Bed mobility skill: Sit to supine   Bed Mobility Skill: Sit to Supine   Level of [place]: Sit/Supine contact guard   Transfer Skill: Bed to Chair/Chair to Bed   Level of [place]: Bed to Chair minimum assist (75% patients effort)   Weight-Bearing Restrictions weight-bearing as tolerated   Assistive Device - Transfer Skill Bed to Chair Chair to Bed Rehab Eval standard walker   Transfer Skill: Sit to Stand   Level of [place]: Sit/Stand minimum assist (75% patients effort)   Transfer Skill: Sit to Stand weight-bearing as tolerated   Assistive Device for Transfer: Sit/Stand standard walker   Lower Body Dressing   Level of [place]: Dress Lower Body maximum assist (25% patients effort)   Eating/Self Feeding   Level of [place]: Eating independent   Instrumental Activities of Daily Living (IADL)   Previous Responsibilities meal prep;laundry;housekeeping;driving;finances;medication management   IADL Comments spouse home to A with IADL's    Activities of Daily Living Analysis   Impairments Contributing to Impaired Activities of Daily Living balance impaired;post surgical precautions;pain   General Therapy Interventions   Planned Therapy Interventions ADL retraining;transfer training   Clinical Impression   Criteria for Skilled Therapeutic Interventions Met yes, treatment indicated   OT Diagnosis dec IND with ADl's and transfers   Influenced by the following impairments pain, post op precautions   Assessment of Occupational Performance 1-3 Performance Deficits   Identified Performance Deficits LE dressing, toilet transfer, toileting   Clinical Decision Making (Complexity) Low complexity   Therapy Frequency daily   Predicted Duration of Therapy Intervention (days/wks)  "3 days   Anticipated Discharge Disposition Home with Assist   Risks and Benefits of Treatment have been explained. Yes   Patient, Family & other staff in agreement with plan of care Yes   [facility] TM \"6 Clicks\"   2016, Trustees of [facility], under license to [id number].  All rights reserved.   6 Clicks Short Forms Daily Activity Inpatient Short Form   [facility] AM-PAC  \"6 Clicks\" Daily Activity Inpatient Short Form   1. Putting on and taking off regular lower body clothing? 2 - A Lot   2. Bathing (including washing, rinsing, drying)? 2 - A Lot   3. Toileting, which includes using toilet, bedpan or urinal? 2 - A Lot   4. Putting on and taking off regular upper body clothing? 4 - None   5. Taking care of personal grooming such as brushing teeth? 4 - None   6. Eating meals? 4 - None   Daily Activity Raw Score (Score out of 24.Lower scores equate to lower levels of function) 18   Total Evaluation Time   Total Evaluation Time (Minutes) 8     "

## 2018-11-28 NOTE — PROGRESS NOTES
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  We were consulted for assistance with medical management      Right total hip arthroplasty POD #1  Patient underwent planned right hip arthroplasty by Dr. Howard on 11/27/18.  EBL 1200 mL.    - Will defer post-op management including pain control, IVF, diet, DVT prophylaxis and disposition to primary service     Ischemic cardiomyopathy w/reduced EF.  Not decompensated   CAD s/p CABG in 2007  HTN/HLP  Paroxysmal a fib   Supposed history of antiphospholipid antibody/anticardiolpin antibody  An echocardiogram on 7/11/18 showed EF of 40-45% with anterior, septal and apical wall akinesis.  No issues currently  At this time patient sounds regular.  Was on Xarelto prior to surgery and this is being held   On 11/28 patient's blood pressures were soft and suspect this may be due to pain medications  - Continue to monitor  - Continue PTA Coreg with hold parameters  - Holding Torsemide due to soft pressures   - Holding ASA, restart once okay with ortho  - Okay to restart Xarelto      Aortic root dilation   Last echo in July also showed the aortic sinus of Valsalva mildly dilated at 4.3 cm and ascending aorta was borderline at 3.7 cm   - Follow up as planned      H/o Alcohol abuse   Possible alcoholic liver disease w/cirrhosis and ascites  Had been drinking 3-4 drinks/night but supposedly quit in 2015 tough an ETOH level from 10/31/18 was 0.10.  Patient carries the history of decompensated alcoholic liver cirrhosis with ascites but CT imaging of the abdomen makes no mention of cirrhosis.  He has had ultrasounds in the past that have showed ascites and has had a paracentesis as recently as 10/30/18.   Patient not forthcoming with ETOH on my evaluation   Continues to not have any evidence of withdrawal   - Continue to monitor.  If develops withdrawal symptoms will order CIWA.      Addendum:  Asked by wound care to evaluate purple discoloration on patient's left lateral stump.  I evaluated it and to me it looks like bruising.  The patient is on Xarelto normally and I suspect he could have hit his leg recently causing the bruise.  It also could be from positioning during his surgery.      DVT Prophylaxis: Xarelto   Code Status: Full Code    Disposition: Expected discharge per primary service.     Spencer Jorgensen, DO  Text Page (7am to 6pm)    Interval History   Patient seen and examined.  Pain well controlled today.  No difficulty breathing or chest pain.      -Data reviewed today: I reviewed all new labs and imaging results over the last 24 hours. I personally reviewed no images or EKG's today.    Physical Exam   Temp: 98.2  F (36.8  C) Temp src: Oral BP: 97/49 (recheck after coreg this am) Pulse: 62 Heart Rate: 67 Resp: 16 SpO2: 95 % O2 Device: None (Room air) Oxygen Delivery: 1 LPM  Vitals:    11/27/18 0737   Weight: 105.5 kg (232 lb 8 oz)     Vital Signs with Ranges  Temp:  [97.2  F (36.2  C)-98.2  F (36.8  C)] 98.2  F (36.8  C)  Pulse:  [62] 62  Heart Rate:  [] 67  Resp:  [10-16] 16  BP: ()/(42-78) 97/49  MAP:  [61 mmHg-72 mmHg] 72 mmHg  Arterial Line BP: ()/(46-54) 110/53  SpO2:  [91 %-100 %] 95 %  I/O last 3 completed shifts:  In: 4394 [P.O.:350; I.V.:3544]  Out: 1600 [Urine:400; Blood:1200]    Constitutional: Awake, alert, cooperative, no apparent distress  Respiratory: Clear to auscultation bilaterally, no crackles or wheezing  Cardiovascular: Regular rate and rhythm, normal S1 and S2, and systolic murmur noted  GI: Normal bowel sounds, soft, non-distended, non-tender  Skin/Integumen: No rashes, no cyanosis  MSK: Left BKA.  No siginficant edema     Medications     lactated ringers 75  mL/hr at 11/27/18 1844       acetaminophen  975 mg Oral Q8H     carvedilol  3.125 mg Oral BID w/meals     fluticasone  1 spray Both Nostrils Daily     gabapentin  600 mg Oral TID     rivaroxaban ANTICOAGULANT  20 mg Oral Daily     senna-docusate  1 tablet Oral BID    Or     senna-docusate  2 tablet Oral BID     sodium chloride (PF)  3 mL Intracatheter Q8H       Data     Recent Labs  Lab 11/28/18  0725 11/27/18  1148 11/27/18  1025 11/27/18  0612   WBC  --   --  4.7  --    HGB Canceled, Test credited 10.0* 10.4*  --    MCV  --   --  96  --    PLT  --   --  88*  --    POTASSIUM  --   --   --  3.7   GLC Canceled, Test credited  --   --   --        Imaging:   No results found for this or any previous visit (from the past 24 hour(s)).

## 2018-11-28 NOTE — CONSULTS
"NUTRITION EDUCATION      REASON FOR ASSESSMENT:  RN consult: Please discuss with pt good diet for skin health.  He has a small fissure on distal left stump    NUTRITION HISTORY:  Information obtained from the pt.  \"My wife cooked for 50 years and says now she's retired\".  Pt follows a regular diet, they drink Ensure for breakfast.    CURRENT DIET:  Regular. Good appetite.    INTERVENTIONS:    Nutrition Prescription:  High protein diet for good skin care    Implementation:      *  Nutrition Education (Content):   A)  Provided handout on Tips to Increase Protein in diet   B)  Discussed good sources - Meat/dairy/eggs      *  Nutrition Education (Application):      *  Diet Education - refer to Education Flowsheet    Goals:      *  Patient will verbalize understanding of the diet      *  All of the above goals met during the education session    Follow Up/Monitoring:      *  Provided RD contact information for future questions      *  Recommended Out-Patient Nutrition Referral, if further diet instructions are needed    Ya Denny RD  Pager [phone] (M-F)            [phone] ([facility])            "

## 2018-11-28 NOTE — CONSULTS
"Antonio Ramirez  2018  POD # 1 sp jori    Admit Date:  2018      Doing well.  Normal healing wound.  No immediate surgical complications identified.  No excessive bleeding  Objective:  Blood pressure 92/54, pulse 62, temperature 97.2  F (36.2  C), temperature source Oral, resp. rate 15, height 1.88 m (6' 2\"), weight 105.5 kg (232 lb 8 oz), SpO2 100 %.    Temperatures:  Current - Temp: 97.2  F (36.2  C); Max - Temp  Av.7  F (36.5  C)  Min: 97.2  F (36.2  C)  Max: 97.9  F (36.6  C)  Pulse range: Pulse  Av  Min: 62  Max: 62  Blood pressure range: Systolic (24hrs), Av , Min:90 , Max:124   ; Diastolic (24hrs), Av, Min:52, Max:78    Exam:  CMS: intact  alert, stable, wound ok  Calf nontender b le.       Labs:  Recent Labs   Lab Test  18   0612  18   1701  10/31/18   1742   POTASSIUM  3.7  4.6  4.1     Recent Labs   Lab Test  18   1148  18   1025  18   1701   HGB  10.0*  10.4*  11.5*     Recent Labs   Lab Test  10/30/18   1324  18   0700  18   0807   INR  1.46*  1.68*  1.39*     Recent Labs   Lab Test  18   1025  18   1701  10/31/18   1652   PLT  88*  102*  77*       PLAN: Weight bearing as tolerated  Start physical therapy  Pain control measures  Cont post-op routine.       "

## 2018-11-28 NOTE — PLAN OF CARE
Problem: Patient Care Overview  Goal: Plan of Care/Patient Progress Review  Outcome: Improving  Pt A&OX4. CMS intact. VSS on 1L O2. Dressing CDI. Taking po dilaudid and IV dilaudid x1 for BTP. Levin discontinue this am- DTV. Continue to monitor.

## 2018-11-28 NOTE — PROGRESS NOTES
"Antonio Ramirez  2018  POD # 1 s/p RTHA  Admit Date:  2018      Doing well.  Objective: pain controlled. No complaints of chest pain or shortness of breath. Ready to work with therapy and hopes to be able to discharge home tomorrow if stable.   Blood pressure 92/54, pulse 62, temperature 97.2  F (36.2  C), temperature source Oral, resp. rate 16, height 1.88 m (6' 2\"), weight 105.5 kg (232 lb 8 oz), SpO2 100 %.    Temperatures:  Current - Temp: 97.2  F (36.2  C); Max - Temp  Av.7  F (36.5  C)  Min: 97.2  F (36.2  C)  Max: 97.9  F (36.6  C)  Pulse range: Pulse  Av  Min: 62  Max: 62  Blood pressure range: Systolic (24hrs), Av , Min:90 , Max:135   ; Diastolic (24hrs), Av, Min:52, Max:78    Exam:  CMS: intact  alert, stable, wound ok  Dressing c/d/i  Calf s/nt  DF/PF   Communicates clearly with examiner    Labs:  Recent Labs   Lab Test  18   0612  18   1701  10/31/18   1742   POTASSIUM  3.7  4.6  4.1     Recent Labs   Lab Test  18   1148  18   1025  18   1701   HGB  10.0*  10.4*  11.5*     Recent Labs   Lab Test  10/30/18   1324  18   0700  18   0807   INR  1.46*  1.68*  1.39*     Recent Labs   Lab Test  18   1025  18   1701  10/31/18   1652   PLT  88*  102*  77*       PLAN: continue current therapy regimen. Restart xarelto today at his home dose. Medical team managing other issues. Use a walker at all times and plan on discharge home tomorrow.     "

## 2018-11-28 NOTE — PLAN OF CARE
Problem: Patient Care Overview  Goal: Plan of Care/Patient Progress Review  Discharge Planner PT   Patient plan for discharge: home Friday with spouse     Current status: order received, chart reviewed, eval completed, tx initiated. Pt seen POD 1 s/p R TOBI (anterior approach with hip precautions). Pt lives in apartment with spouse. Pt reports IND with all ADL/IADL's prior including driving. Pt has L BKA with prosthesis. Pt occasionally uses SEC.   Pt declines functional transfers at this time due to lack of confidence and pain. Pt agreeable with encouragement. Pt required mod A for sit <> stand with use of FWW. Pt completed chair > bed transfer with Min A. CGA for bed mobility sit > supine with cues for hip precautions. OT educated and reviewed hip precautions in booklet. Pt not confident in transfer abilities at this time. OT educated and demonstrated use of AE for LE dressing. Pt able to don/doff socks and pants with min A  and use of AE. Pt reports spouse will be able to A with socks and pants and prosthesis as needed. OT educated on use of reacher to maintain hip precautions.      Barriers to return to prior living situation: pain, post op precautions     Recommendations for discharge: per BPCI, home with spouse A with ADL/IADL's    Rationale for recommendations: anticipate pt will continue to progress with IP OT and improve tolerance for ADL's and transfers as pain improves. Pt reports spouse will be home to A as needed.        Entered by: Marlena Sun 11/28/2018 3:09 PM

## 2018-11-28 NOTE — ADDENDUM NOTE
Addendum  created 11/28/18 1043 by Sonido Latif MD    Child order released for a procedure order, Order Canceled from Note

## 2018-11-28 NOTE — ANESTHESIA PROCEDURE NOTES
ARTERIAL LINE PROCEDURE NOTE:   Pre-Procedure  Performed by MAYTE GIRON  Location: pre-op      Pre-Anesthestic Checklist: patient identified, IV checked, site marked, risks and benefits discussed, informed consent, monitors and equipment checked and pre-op evaluation    Timeout  Correct Patient: Yes   Correct Procedure: Yes   Correct Site: Yes   Correct Laterality: Yes   Correct Position: Yes   Site Marked: N/A   .   Procedure Documentation  Procedure: arterial line  ASA 4  Supine  Insertion Site:radial, left.Skin infiltrated with mL of 1% lidocaine. Injection technique: Seldinger Technique  .  .  Patient Prep;all elements of maximal sterile barrier technique followed, mask, hat, sterile gown, sterile gloves, draped, hand hygiene, chlorhexidine gluconate and isopropyl alcohol    Assessment/Narrative    Catheter: 20 gauge, 1.75 in/4.5 cm quick cath (integral wire)      Tegaderm dressing used.    Arterial waveform: Yes     Comments:  Site sterilely prepped with Chloraprep.  Lidocaine 1% used for skin topicalization.  Left radial artery palpated.  Arrow catheter advanced into radial artery, with return of bright red blood.  Pulsatile waveform on monitor.  Patient tolerate procedure well.

## 2018-11-28 NOTE — PROVIDER NOTIFICATION
Talks to Lee about pt Hgb. Came back at 7.0 after redraw. Patient does not c/o dizziness after getting back to bed this afternoon, just said he felt weak after not getting up for the night with PT this am. MD aware that BP has been soft and at times yelena. MD ordered to recheck in am. Order already in place.

## 2018-11-28 NOTE — PLAN OF CARE
Problem: Patient Care Overview  Goal: Plan of Care/Patient Progress Review  Outcome: Improving  PT A&O X4.  VSS but has soft BPs. On 1 L of O2 NC. Due to void. Up with 1/walker/gaitbelt. Taking dilaudid for pain management. Continue to monitor.

## 2018-11-28 NOTE — DISCHARGE SUMMARY
Orthopedic Discharge Summary   Patient: Antonio Ramirez  Admission Date: 11/27/2018  Discharge Date: 11/29/18  Date of Service: 11/28/2018  Attending Provider: Saima Howard MD  Admission Diagnosis: DJD RIGHT HIP  S/P total hip arthroplasty  Discharge Diagnosis: right hip osteoarthritis  Procedures Performed: right total hip replacement  Complications: None apparent   History of Present Illness: see operative report for full HPI  Past Medical History:   Past Medical History:   Diagnosis Date     Alcoholism (H)      Amputated left leg (H)      Anemia      Anticardiolipin antibody syndrome (H)      Antiphospholipid antibody syndrome (H)      Antiplatelet or antithrombotic long-term use      Aortic root dilatation (H)      Aortic stenosis      Arthritis      Balance problem      Coronary artery disease     CABG 2007: SVG to LAD, SVG to diagonal, SVG to OM; cardiac cath 5/17/18: thrombectomy for restenosis of stents-sent for urgent CABG, cath 5/14/2007: GRECIA x2 to LAD, Grecia to diagonal     Gastro-oesophageal reflux disease      Heart attack (H) 2007    apparently arrested, cardiac X5     Hiatal hernia      Hypercholesterolemia      Hypertension      Ischemic cardiomyopathy      Left foot drop      Liver disease     alcoholic liver disease, alcoholic cirrohsosis, portal hypertension     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)     ?When diagnosed, patient not aware of this     Moderate mitral regurgitation      Monoclonal gammopathy      Neuropathy      Noninfectious ileitis     diverticulitis of colon     Nonrheumatic tricuspid valve regurgitation      Paroxysmal atrial fibrillation (H)      PE (pulmonary embolism) 2006    saddle emboli 2006     Prostate cancer (H) 2000    Treated with radiation (seed implants in 2000) -- no problems since     Pulmonary hypertension (H)      Renal disease     kidney stones     Syncope      Thrombocytopenia (H)      Urethral stricture      Venous thrombosis     IVC filter and  lysis procedures 2007     Patient Active Problem List   Diagnosis     Alcohol abuse     Alcoholic cirrhosis of liver (H)     Chronic kidney disease, stage III (moderate) (H)     Physical deconditioning     Prostate cancer -- S/P Radiative Seed implants in 2000 (no problems since)     Antiphospholipid antibody syndrome (H)     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 (H)     Chronic congestive heart failure, unspecified congestive heart failure type     Thrombocytopenia -- suspect related to alcohol use     Subdural hematoma (H)     Benign essential hypertension     Malabsorption of iron     Calculi, ureter     Anemia     Coronary artery disease involving native coronary artery of native heart without angina pectoris     Aortic stenosis     Nonrheumatic mitral valve regurgitation     Nonrheumatic tricuspid valve regurgitation     Pulmonary hypertension (H)     Paroxysmal atrial fibrillation (H)     Ischemic cardiomyopathy     Aortic root dilatation (H)     Venous thrombosis     S/P total hip arthroplasty     Past Surgical History:   Procedure Laterality Date     AMPUTATE LEG BELOW KNEE Left 04/2014    related to gangrene, started as foot ulcer related to neuropathy     AMPUTATE LEG BELOW KNEE Left 12/4/2017    Procedure: AMPUTATE LEG BELOW KNEE;  Exploration of Left Leg Below Knee Amputation Stump with Ligation of Bleeders.;  Surgeon: Leandro Arreola MD;  Location:  OR     APPENDECTOMY       APPLY WOUND VAC Left 12/6/2017    Procedure: APPLY WOUND VAC;;  Surgeon: Saima Howard MD;  Location:  [facility]     ARTHROPLASTY KNEE Right 9/19/2014    Procedure: ARTHROPLASTY KNEE;  Surgeon: Saima Howard MD;  Location:  OR     CARDIAC SURGERY      CABG     CHOLECYSTECTOMY       COLONOSCOPY       COMBINED CYSTOSCOPY, RETROGRADES, EXCHANGE STENT URETER(S) Left 7/24/2018    Procedure: COMBINED CYSTOSCOPY, RETROGRADES, EXCHANGE STENT URETER(S);  CYSTOSCOPY, BILATERAL RETROGRADES, BILATERAL URETERAL STENT  EXCHANGE ;  Surgeon: Matt Cervantes MD;  Location:  OR     CRANIOTOMY Right 4/7/2018    Procedure: CRANIOTOMY;  Right Craniotomy For Subdural Hematoma;  Surgeon: Jono Issa MD;  Location:  OR     CYSTOSCOPY, DILATE URETHRA, COMBINED N/A 10/12/2016    Procedure: COMBINED CYSTOSCOPY, DILATE URETHRA;  Surgeon: Dimitry Bello MD;  Location:  OR     CYSTOSCOPY, REMOVE STENT(S), COMBINED Right 7/24/2018    Procedure: COMBINED CYSTOSCOPY, REMOVE STENT(S);;  Surgeon: Jose Hunter MD;  Location:  OR     ENDOSCOPIC STRIPPING VEIN(S)       esophagogastroduodenoscopy       EYE SURGERY      cataracts     GENITOURINARY SURGERY      Prostate Seen Implants     HERNIA REPAIR      Umbilical     INCISION AND DRAINAGE LOWER EXTREMITY, COMBINED Left 12/1/2017    Procedure: COMBINED INCISION AND DRAINAGE LOWER EXTREMITY;  INCISION AND DRAINAGE OF LEFT BELOW KNEE AMPUTATION ABSCESS, WOUND VAC PLACEMENT;  Surgeon: Saima Howard MD;  Location:  OR     IR IVC FILTER PLACEMENT       IRRIGATION AND DEBRIDEMENT LOWER EXTREMITY, COMBINED Left 12/6/2017    Procedure: COMBINED IRRIGATION AND DEBRIDEMENT LOWER EXTREMITY;  IRRIGATION AND DEBRIDEMENT OF LEFT BELOW KNEE AMPUTATION SITE WITH WOUND VAC REPLACEMENT;  Surgeon: Saima Howard MD;  Location:  [facility]     IRRIGATION AND DEBRIDEMENT LOWER EXTREMITY, COMBINED Left 12/8/2017    Procedure: COMBINED IRRIGATION AND DEBRIDEMENT LOWER EXTREMITY;  IRRIGATION AND DEBRIDEMENT OF LEFT BELOW THE KNEE AMPUTATION AND SHORTENING OF THE TIBIA WITH WOUND CLOSURE;  Surgeon: Saima Howard MD;  Location:  OR     LASER HOLMIUM LITHOTRIPSY URETER(S), INSERT STENT, COMBINED Bilateral 6/28/2018    Procedure: COMBINED CYSTOSCOPY, URETEROSCOPY, LASER HOLMIUM LITHOTRIPSY URETER(S), INSERT STENT;  CYSTOSCOPY, BILATERAL URETEROSCOPY,  HOLMIUM LASER LITHOTRIPSY, BILATERAL STENT PLACEMENT, STONE BASKETING;  Surgeon: Jose Hunter MD;  Location:  OR      LASER HOLMIUM LITHOTRIPSY URETER(S), INSERT STENT, COMBINED Left 8/14/2018    Procedure: COMBINED CYSTOSCOPY, URETEROSCOPY, LASER HOLMIUM LITHOTRIPSY URETER(S), INSERT STENT;  CYSTOSCOPY, LEFT URETEROSCOPY, LEFT LASER HOLMIUM LITHOTRIPSY URETER(S) REMOVAL BILATERAL STENTS;  Surgeon: Jose Hunter MD;  Location: [facility]     ORTHOPEDIC SURGERY      (R) knee scope     ORTHOPEDIC SURGERY      total hip      ORTHOPEDIC SURGERY      elbow surgery     Social History     Social History     Marital status:      Spouse name: N/A     Number of children: 2     Years of education: N/A     Occupational History     Retired       Social History Main Topics     Smoking status: Never Smoker     Smokeless tobacco: Never Used     Alcohol use Yes      Comment: quit 10/2015; now 3-4 Vodkas/night     Drug use: No     Sexual activity: Not Currently     Other Topics Concern     Not on file     Social History Narrative    , 2 children, retired . He does not have a living will but desires full code.  (last updated 11/29/2017)      Medications on admission:   Prescriptions Prior to Admission   Medication Sig Dispense Refill Last Dose     aspirin 81 MG tablet Take 81 mg by mouth daily   11/22/2018 at pm     carvedilol (COREG) 3.125 MG tablet Take 1 tablet (3.125 mg) by mouth 2 times daily (with meals) 180 tablet 1 11/27/2018 at 0500     fluticasone (FLONASE) 50 MCG/ACT spray Spray 1 spray into both nostrils daily   11/27/2018 at 0500     gabapentin (NEURONTIN) 600 MG tablet Take 1 tablet (600 mg) by mouth 3 times daily 270 tablet 3 11/27/2018 at 0500     Rivaroxaban (XARELTO PO) Take 20 mg by mouth daily   11/22/2018 at pm     torsemide (DEMADEX) 20 MG tablet Take 20 mg by mouth daily   10/27/2018     [DISCONTINUED] HYDROmorphone HCl (DILAUDID PO) Take 2 mg by mouth every 6 hours as needed for moderate to severe pain   11/13/2018 at prn     Allergies:    Allergies   Allergen Reactions     Ciprofloxacin  "Itching     Severe itching \"like ants were crawling\"     Hmg-Coa-R Inhibitors Other (See Comments)     Rhabdomyolysis occurred within a couple days       Hospital Course: Patient was admitted to Orthopedics and brought to the OR on where he underwent the above named procedure. Postoperatively he did very well with no apparent complications, and he had an uneventful hospital stay. Antibiotics were given for 24 hours after surgery. He was given xarelto for DVT prophylaxis and his pain was well controlled with dilaudid, then transitioned to oral pain medications during his hospital stay. He progressed well with physical therapy and discharge to home with home health was recommended. His chronic medical problems were followed by the medicine team during his hospital stay and there were no significant changes to conditions or treatment plans. No new medical problems presented during his hospital stay. Patient will not wear TRACY stockings here in the hospital but states that he has lymphedema stockings at his house that he will use once he gets home to help further prevent DVT. Patient had decreased respiratory status overnight post op day one and seemed \"groggy\". His dilaudid was decreased from 2-4mg every 4 hours to 1-2 mg every 4 hours per Dr. Howard's instruction. He also was transfused post op day 2.   Consultations Obtained During Hospitalization:  1. Internal medicine for management of comorbid conditions and home medication management.  2. Physical Therapy for gait training, mobilization, range of motion and strengthening exercises  3. Occupational Therapy for activities of daily living.  4. Social Work for disposition planning.  5. Wound care consult for management of abrasion injury to the left BKA stump  Active Problems:    S/P total hip arthroplasty    Discharge Disposition: patient improving status post right total hip replacement  Discharge Diet: patient can resume pre operative diet  Discharge Medications: "   Current Discharge Medication List      START taking these medications    Details   hydrOXYzine (ATARAX) 10 MG tablet Take 1 tablet (10 mg) by mouth every 6 hours as needed for itching  Qty: 30 tablet, Refills: 0    Associated Diagnoses: Status post total replacement of right hip      ondansetron (ZOFRAN-ODT) 4 MG ODT tab Take 1 tablet (4 mg) by mouth every 6 hours as needed for nausea or vomiting  Qty: 30 tablet, Refills: 0    Associated Diagnoses: Status post total replacement of right hip      senna-docusate (SENOKOT-S/PERICOLACE) 8.6-50 MG tablet Take 1 tablet by mouth 2 times daily  Qty: 30 tablet, Refills: 0    Associated Diagnoses: Status post total replacement of right hip         CONTINUE these medications which have CHANGED    Details   HYDROmorphone (DILAUDID) 2 MG tablet Take 1-2 tablets (1-2mg) by mouth every 4 hours as needed for breakthrough pain  Qty: 30 tablet, Refills: 0    Associated Diagnoses: Status post total replacement of right hip         CONTINUE these medications which have NOT CHANGED    Details   aspirin 81 MG tablet Take 81 mg by mouth daily      carvedilol (COREG) 3.125 MG tablet Take 1 tablet (3.125 mg) by mouth 2 times daily (with meals)  Qty: 180 tablet, Refills: 1      fluticasone (FLONASE) 50 MCG/ACT spray Spray 1 spray into both nostrils daily      gabapentin (NEURONTIN) 600 MG tablet Take 1 tablet (600 mg) by mouth 3 times daily  Qty: 270 tablet, Refills: 3    Associated Diagnoses: Phantom limb pain (H)      Rivaroxaban (XARELTO PO) Take 20 mg by mouth daily      torsemide (DEMADEX) 20 MG tablet Take 20 mg by mouth daily           Code Status: full code  X-rays needed at the follow up visit: AP pelvis and xtable lateral of the right hip  Iftikhar Santamaria PA-C  11/28/2018 7:06 AM   [facility]  [phone]

## 2018-11-28 NOTE — PROGRESS NOTES
Bladder scanned patient for 147. Patient does not feel discomfort or urge. Previous time with catheter pt reports similar occurrence. Has been pushing fluids throughout day. Patient would refer to wait until urge rather than straight cathing.

## 2018-11-28 NOTE — PLAN OF CARE
Problem: Hip Arthroplasty (Total, Partial) (Adult)  Goal: Signs and Symptoms of Listed Potential Problems Will be Absent, Minimized or Managed (Hip Arthroplasty)  Signs and symptoms of listed potential problems will be absent, minimized or managed by discharge/transition of care (reference Hip Arthroplasty (Total, Partial) (Adult) CPG).   Outcome: No Change  Patient is A&Ox4. VSS. C/o hip pain, gave PO dilaudid x1. L hip incision, CDI and R BKA, CDI. Numbness in right leg. Bruising, scars, and scabs throughout. Calls appropriately.

## 2018-11-28 NOTE — PROGRESS NOTES
"[facility] Nurse Inpatient Wound Assessment     Initial Assessment of wound(s) on pt's:   Distal left stump        Data:   Patient History:      per MD note(s):  Pt is s/p RIGHT TOTAL HIP ARTHROPLASTY 18 w Dr. Howard      Ottoniel Assessment and sub scores:   Ottoniel Score  Av.2  Min: 14  Max: 23    Positioning: Pillows,     Mattress:  Standard , Atmos Air mattress    Current Diet / Nutrition:           Active Diet Order      Advance Diet as Tolerated: Regular Diet Adult      Diet      Labs:   Recent Labs   Lab Test  18   0725   18   1025  18   1701   10/30/18   1324   18   1641   ALBUMIN   --    --    --   3.0*   --    --    < >   --    HGB  Canceled, Test credited   < >  10.4*  11.5*   < >   --    < >  10.6*   INR   --    --    --    --    --   1.46*   < >   --    WBC   --    --   4.7  3.0*   < >   --    < >  4.4   A1C   --    --    --    --    --    --    --   4.6    < > = values in this interval not displayed.       Wound Assessment (location):   Distal left stump  Wound History:  Per discussion with pt and wife:  Pt had LLE amputated amputated about 5 years ago in [place].  Has been to Dr. Howard for debridements of the stump.  Both state that stump as never really developed a callus in this time.  Pt has had some crusty drainage that has developed off and and on over the years.  Wife says more recently a a fissure has developed with a bit of drainage and would like someone to look at it.  Pt has stopped wearing a stocking between skin and prosthetic device.  The device has a foul odor, no apparent drainage noted on gray, rubbery prosthetic   Pt states he has no sensation below the knee.   Pt has a 2\" diameter, circular, purple ring of nonblanchable  erythema near the lateral left knee, no itching or pain.  He was not aware of its presence   There is nothing obvious on the prosthetic that would make such an impression on the skin  Distal left stump- near " "the full base of the stump is dry, orange-isis scabs, adherent that looks like the start of callused tissue underlying.  There is a small, dry fissure noted in the center, no drainage noted, base is same coloration as surrounding tissue so a bit hard to find, measures about 0.1cm x 0.3cm            Intervention:     Patient's chart evaluated.      Wound(s) assessed with wife and nurse at the bedside    Orders  Written    Supplies  reviewed, discussed with RN, discussed with family and discussed with patient  Discussed dietician consult with pt and wife    Discussed plan of care with Patient, Family and Nurse, Dr. Jorgensen- please assess purple ring of tissue on left lateral below knee- ring worm? PI? Healing bruise?              All patient / family questions answered:  YES          Assessment:          It looks like there may be a bit of a callus starting to form on the distal stump.  But set within the dry, crusty scabby-callused tissue is a small fissure that occassionally drains.   Will try to encourage the callus by keeping most of the tissue clean and dry but adding a little dab of Vaseline on the fissure to keep the wound a little moistened to try to promote healing.      Had a lengthy discussion with pt and wife about necessity of wearing a sock with the prosthetic, the rubbery \"sleeve\" that is worn next to skin is smelly and holding old drainage, sweat, wound and skin debris= not good for skin and wounds.  Pt needs to talk with orthotics about what they recommend for care of prosthetics devices, if they fit properly and if they want him wearing a washable sleeve next to skin, etc.     Pt intelligent but seems a bit difficult to talk with in that seems trying to justify not wearing a washable sock next to the skin, not doing BID skin/ wound cares and \"how can he wash his leg in the morning\"- told him I don't know how he will do it but the prosthetic smells and he needs to clean his skin more than the once a " "week he has been doing it.      I am not sure what the circular purple ring is on the left lateral knee, site does note seem to correlate with pressure caused by the prosthetic, does not seem to the normal coloration of ring worm(is purple vs crusty orange-red) but pt does have a very smelly rubbery device he wears on his leg and then this may just be a healing old bruise that is healing in a weird way  I had a brief conversation with Dr. Jorgensen asking him to please stop by and look at the site.  This site can just be left open to the air and cleaned BID        Plan:     Nursing to notify the Provider(s) and re-consult the [facility] Nurse if wound(s) deteriorate(s) or if the wound care plan needs reevaluation.    Plan of care for left stump: 2x/ day    1. Clean with gentle soap and water, dry and dry again.  Make sure is dry before putting on prosthetic.    2. Apply lotion down to the very distal aspect of the stump but not on the very bottom/ distal aspect- want that to become dry and callused    3. Apply very small dab of Vaseline just where the fissure is.  Goal is to keep that just a bit moist so it can heal but not moisten the surround callus    4. Cover distal stump with one, dry 4x4 then a 4x4 that is opened and conformed to the stump\    5. Wear a washable sleeve    6. Keep rubbery prosthetic clean      PERFORM \"GOOD FOOT CARE to the right foot daily:  1. CLEAN right foot daily using gentle  soap and water, making sure to clean between the toes. Dry thoroughly, especially between the toes. Be careful when drying between toes to not use a sawing-action, this may cut the skin between the toes.   2. Spray the skin from toes to knee, not between the toes with William Cleanse and Protect, massage into skin and allow sit on skin for 5-10 minutes then wipe or rinse off (the William will \"condition\" the skin by loosening crusty debris, moisturizing and cleaning the skin)  3. LOTION from toes to knee, not between toes (lotion " moisturizes and too much moisture between toes may cause a fungal rash).    4.  If noticing moisture, itching or odor between the toes dust with antifungal powder, think Desenex, twice a day x 4-5 days.    5. Clean SOCKS every day.  6.  SHOES:  Wear hard soled shoes at all times to minimize bumping feet and/or stepping on something.  This includes wearing hard soled slippers around the house.  No flip flops or open toed shoes in the summer, etc.  You need to protect your toes and feet. And if shoes do not fit then DO NOT WEAR THEM  7.CHECK FEET at least two to three times every day by feeling with hands/ fingers and fully visualizing (use a hand held mirror).  You are looking for new wounds, irritation, etc.  Recommend checking in morning before even getting out of bed and check again before bed.   Also would be important to check feet after removing shoes, assuring no wrinkles in socks and shoes fitting properly.  Air feet throughout the day, as able (drying out feet and relieving pressure)  8. BLOOD SUGARS:  Keep blood sugars in a good balance.   9.. Keep nails trimmed and filed to minimize snagging, this could create a wound.  Follow up with podiatry to trim nails as needed.   11. Are you using COMPRESSION WRAPS?  Assure you follow up for regular fittings.  Note- all wraps and stockings wear out.  Some after only a few weeks.      Will order orthotics and dietician to see pt    WOC Nurse will return: weekly and prn

## 2018-11-28 NOTE — OP NOTE
Procedure Date: 11/27/2018      PREOPERATIVE DIAGNOSIS:  Osteoarthritis of the right hip.      POSTOPERATIVE DIAGNOSIS:  Osteoarthritis of the right hip.      PROCEDURE:  Right total hip arthroplasty using a DePuy size 6 high offset Tri-Lock stem, a 60 mm [place] cup and a 36 mm ceramic head with a +5 neck and a +4 neutral polyethylene liner for a 60 mm cup and a 36 mm head.      INDICATIONS FOR PROCEDURE:  The patient is a 73-year-old male with multiple medical problems.  He has osteoarthritis of his right hip.  He has had a previous left total arthroplasty and a below-knee amputation on the left side.  He initially was managed with activity modification and intra-articular injections of the hip.  He does not tolerate nonsteroidals due to his kidney disease.  I saw the patient in consultation and discussed treatment options with him.  I then asked him to see his cardiologist and his internist to see if he could be cleared for surgery.  He was worked up exhaustively and cleared for surgery.  I explained to the patient and his wife the risks, benefits, potential complications of total hip arthroplasty.  This discussion included but not specific to infection, vascular or neurologic complications, DVT, leg length inequality, instability of the hip, fracture, septic and aseptic loosening, heterotopic ossification and the possible need for further revision surgery.  I also discussed because of his medical condition that he may not survive the operation or the postoperative period.  He voiced understanding of this and wanted to proceed.      ANESTHESIA:  General.      SURGEON:  Saima Howard MD.      ASSISTANT:  Haroon Santamaria PA-C.      DESCRIPTION OF PROCEDURE:  The patient was taken to the operating room and placed on the table in supine position.  After adequate induction of a general anesthetic, he was placed in lateral position and held this way with [place] hip farmer.  Axillary roll was placed.  It should be noted  that prior to coming to the operating room, an arterial line was placed in the left upper extremity.  After he was properly positioned, we then performed sterile prep and drape of the right hip and right lower extremity.  The patient was given 2 grams of Ancef for infection and that was given 1 hour prior to incision.  After sterile prep and drape, incision was made.  We proceeded with an anterolateral approach to the hip.  I identified the abductors, took down the anterior one-third of the abductors, tagged them and retracted medially for later repair.  I then did a T capsulotomy, preserving the capsule for later repair.  We were then able to dislocate the hip.  Upon dislocation of the hip, we made appropriate femoral neck cut and applied bone wax to cut surface of the femur to diminish blood loss.  We then placed retractors anteriorly, superiorly and posteriorly to expose the acetabulum.  We then reamed to 59 mm and eventually put in a 60 mm cup.  Prior to placing the cup, we took reamings of the femoral head to use as bone graft.  There were multiple cysts that were curetted and bone grafted.  Once the cup was fully seated, 2 screws were applied for additional fixation.  We then applied a manhole cover.  We then put in the +4 neutral liner for a 36 mm head and a 60 mm cup.  We then directed our attention to the femur, used a broach-only technique and broached to a size 6 stem, reduced the hip and found it to be very stable in full extension, external rotation, flexion, adduction and rotation with restoration of leg length and offset.  We then obtained intraoperative AP pelvis x-ray which demonstrated ideal position of the cup and screws with a good fit on the femoral side with restoration of leg length and offset.  We then dislocated the hip, irrigated with pulse jet lavage and put down the actual stem.  Once the stem was fully seated, we then applied the head for a 36 mm head with a +5 neck, ceramic.  We reduced  the hip again found it to be very stable in full extension, external rotation, flexion, adduction and internal rotation with restoration of leg length and offset.  I then soaked the hip in a dilute solution of Betadine for 3 minutes.  We then irrigated again using pulse jet lavage, used approximately 3 liters of antibiotic saline in the pulsatile lavage.  We then repaired the capsule using 0 Ethibond.  The abductors were repaired using #2 Ethibond.  The fascial layer was closed using O Ethibond that was oversewn using 0 Stratafix.  The deep subq was closed in multiple layers using 0 Vicryl, superficial views closed in multiple layers using 2-0 Vicryl, skin was closed with a running 3-0 Monocryl subcuticular stitch followed by Prineo dressing.  Sterile dressing was applied, followed by an abduction pillow.  The patient tolerated the operative procedure.  There were no intraoperative complications.  The patient went to the [facility] in stable condition.  It should be noted that on his left lower extremity at the below-knee amputation site, there was some slight drainage.  This was redressed with a sterile dressing.  The plan will be to get a wound care consult.  We also will get 24 hours of Ancef for infection prophylaxis and 30 days of Coumadin for DVT prophylaxis.         AUDRA SMITH MD             D: 2018   T: 2018   MT: RENNY      Name:     GALILEA LUGO   MRN:      [id number]        Account:        [id number]   :      1945           Procedure Date: 2018      Document: [id number]

## 2018-11-29 ENCOUNTER — APPOINTMENT (OUTPATIENT)
Dept: PHYSICAL THERAPY | Facility: CLINIC | Age: 73
DRG: 470 | End: 2018-11-29
Attending: ORTHOPAEDIC SURGERY
Payer: MEDICARE

## 2018-11-29 ENCOUNTER — APPOINTMENT (OUTPATIENT)
Dept: CARDIOLOGY | Facility: CLINIC | Age: 73
DRG: 470 | End: 2018-11-29
Attending: NURSE PRACTITIONER
Payer: MEDICARE

## 2018-11-29 LAB
ALBUMIN SERPL-MCNC: 3 G/DL (ref 3.4–5)
ALP SERPL-CCNC: 65 U/L (ref 40–150)
ALT SERPL W P-5'-P-CCNC: 10 U/L (ref 0–70)
ANION GAP SERPL CALCULATED.3IONS-SCNC: 4 MMOL/L (ref 3–14)
AST SERPL W P-5'-P-CCNC: 25 U/L (ref 0–45)
BILIRUB DIRECT SERPL-MCNC: 0.5 MG/DL (ref 0–0.2)
BILIRUB SERPL-MCNC: 0.8 MG/DL (ref 0.2–1.3)
BLD PROD TYP BPU: NORMAL
BLD UNIT ID BPU: 0
BLOOD PRODUCT CODE: NORMAL
BPU ID: NORMAL
BUN SERPL-MCNC: 21 MG/DL (ref 7–30)
CALCIUM SERPL-MCNC: 8.2 MG/DL (ref 8.5–10.1)
CHLORIDE SERPL-SCNC: 101 MMOL/L (ref 94–109)
CO2 SERPL-SCNC: 28 MMOL/L (ref 20–32)
CREAT SERPL-MCNC: 1.59 MG/DL (ref 0.66–1.25)
GFR SERPL CREATININE-BSD FRML MDRD: 43 ML/MIN/1.7M2
GLUCOSE BLDC GLUCOMTR-MCNC: 102 MG/DL (ref 70–99)
GLUCOSE SERPL-MCNC: 91 MG/DL (ref 70–99)
HGB BLD-MCNC: 7.1 G/DL (ref 13.3–17.7)
PLATELET # BLD AUTO: 91 10E9/L (ref 150–450)
POTASSIUM SERPL-SCNC: 4.8 MMOL/L (ref 3.4–5.3)
PROT SERPL-MCNC: 6.4 G/DL (ref 6.8–8.8)
SODIUM SERPL-SCNC: 133 MMOL/L (ref 133–144)
TRANSFUSION STATUS PATIENT QL: NORMAL
TRANSFUSION STATUS PATIENT QL: NORMAL

## 2018-11-29 PROCEDURE — 80076 HEPATIC FUNCTION PANEL: CPT | Performed by: NURSE PRACTITIONER

## 2018-11-29 PROCEDURE — 25000132 ZZH RX MED GY IP 250 OP 250 PS 637: Mod: GY | Performed by: INTERNAL MEDICINE

## 2018-11-29 PROCEDURE — 99233 SBSQ HOSP IP/OBS HIGH 50: CPT | Performed by: INTERNAL MEDICINE

## 2018-11-29 PROCEDURE — L8420 PROSTHETIC SOCK MULTI PLY BK: HCPCS

## 2018-11-29 PROCEDURE — P9016 RBC LEUKOCYTES REDUCED: HCPCS | Performed by: NURSE PRACTITIONER

## 2018-11-29 PROCEDURE — 86900 BLOOD TYPING SEROLOGIC ABO: CPT | Performed by: NURSE PRACTITIONER

## 2018-11-29 PROCEDURE — 85018 HEMOGLOBIN: CPT | Performed by: ORTHOPAEDIC SURGERY

## 2018-11-29 PROCEDURE — 93005 ELECTROCARDIOGRAM TRACING: CPT

## 2018-11-29 PROCEDURE — 99207 ZZC APP CREDIT; MD BILLING SHARED VISIT: CPT | Performed by: NURSE PRACTITIONER

## 2018-11-29 PROCEDURE — 97116 GAIT TRAINING THERAPY: CPT | Mod: GP | Performed by: PHYSICAL THERAPIST

## 2018-11-29 PROCEDURE — 40000275 ZZH STATISTIC RCP TIME EA 10 MIN

## 2018-11-29 PROCEDURE — A9270 NON-COVERED ITEM OR SERVICE: HCPCS | Mod: GY | Performed by: INTERNAL MEDICINE

## 2018-11-29 PROCEDURE — 25000132 ZZH RX MED GY IP 250 OP 250 PS 637: Performed by: ORTHOPAEDIC SURGERY

## 2018-11-29 PROCEDURE — 97530 THERAPEUTIC ACTIVITIES: CPT | Mod: GP | Performed by: PHYSICAL THERAPIST

## 2018-11-29 PROCEDURE — 93306 TTE W/DOPPLER COMPLETE: CPT | Mod: 26 | Performed by: INTERNAL MEDICINE

## 2018-11-29 PROCEDURE — 93306 TTE W/DOPPLER COMPLETE: CPT

## 2018-11-29 PROCEDURE — 93010 ELECTROCARDIOGRAM REPORT: CPT | Performed by: INTERNAL MEDICINE

## 2018-11-29 PROCEDURE — A9270 NON-COVERED ITEM OR SERVICE: HCPCS | Performed by: ORTHOPAEDIC SURGERY

## 2018-11-29 PROCEDURE — 36415 COLL VENOUS BLD VENIPUNCTURE: CPT | Performed by: ORTHOPAEDIC SURGERY

## 2018-11-29 PROCEDURE — 94762 N-INVAS EAR/PLS OXIMTRY CONT: CPT

## 2018-11-29 PROCEDURE — 86923 COMPATIBILITY TEST ELECTRIC: CPT | Performed by: NURSE PRACTITIONER

## 2018-11-29 PROCEDURE — 99207 ZZC CDG-MDM COMPONENT: MEETS MODERATE - UP CODED: CPT | Performed by: INTERNAL MEDICINE

## 2018-11-29 PROCEDURE — 85049 AUTOMATED PLATELET COUNT: CPT | Performed by: ORTHOPAEDIC SURGERY

## 2018-11-29 PROCEDURE — 36415 COLL VENOUS BLD VENIPUNCTURE: CPT | Performed by: NURSE PRACTITIONER

## 2018-11-29 PROCEDURE — 12000007 ZZH R&B INTERMEDIATE

## 2018-11-29 PROCEDURE — 40000193 ZZH STATISTIC PT WARD VISIT: Performed by: PHYSICAL THERAPIST

## 2018-11-29 PROCEDURE — 80048 BASIC METABOLIC PNL TOTAL CA: CPT | Performed by: NURSE PRACTITIONER

## 2018-11-29 PROCEDURE — 86850 RBC ANTIBODY SCREEN: CPT | Performed by: NURSE PRACTITIONER

## 2018-11-29 PROCEDURE — 97110 THERAPEUTIC EXERCISES: CPT | Mod: GP | Performed by: PHYSICAL THERAPIST

## 2018-11-29 PROCEDURE — 00000146 ZZHCL STATISTIC GLUCOSE BY METER IP

## 2018-11-29 PROCEDURE — 86901 BLOOD TYPING SEROLOGIC RH(D): CPT | Performed by: NURSE PRACTITIONER

## 2018-11-29 RX ORDER — HYDROMORPHONE HYDROCHLORIDE 2 MG/1
2 TABLET ORAL EVERY 4 HOURS PRN
Qty: 30 TABLET | Refills: 0 | Status: SHIPPED | OUTPATIENT
Start: 2018-11-29 | End: 2018-12-11

## 2018-11-29 RX ORDER — HYDROMORPHONE HYDROCHLORIDE 2 MG/1
2 TABLET ORAL EVERY 4 HOURS PRN
Status: DISCONTINUED | OUTPATIENT
Start: 2018-11-29 | End: 2018-12-05 | Stop reason: HOSPADM

## 2018-11-29 RX ADMIN — FLUTICASONE PROPIONATE 1 SPRAY: 50 SPRAY, METERED NASAL at 12:37

## 2018-11-29 RX ADMIN — SENNOSIDES AND DOCUSATE SODIUM 2 TABLET: 8.6; 5 TABLET ORAL at 20:12

## 2018-11-29 RX ADMIN — GABAPENTIN 600 MG: 600 TABLET, FILM COATED ORAL at 19:00

## 2018-11-29 RX ADMIN — SENNOSIDES AND DOCUSATE SODIUM 2 TABLET: 8.6; 5 TABLET ORAL at 12:19

## 2018-11-29 RX ADMIN — HYDROMORPHONE HYDROCHLORIDE 4 MG: 2 TABLET ORAL at 02:36

## 2018-11-29 RX ADMIN — HYDROMORPHONE HYDROCHLORIDE 4 MG: 2 TABLET ORAL at 08:07

## 2018-11-29 RX ADMIN — RIVAROXABAN 20 MG: 10 TABLET, FILM COATED ORAL at 20:12

## 2018-11-29 RX ADMIN — CARVEDILOL 3.12 MG: 3.12 TABLET, FILM COATED ORAL at 12:15

## 2018-11-29 RX ADMIN — GABAPENTIN 600 MG: 600 TABLET, FILM COATED ORAL at 21:58

## 2018-11-29 RX ADMIN — GABAPENTIN 600 MG: 600 TABLET, FILM COATED ORAL at 12:15

## 2018-11-29 ASSESSMENT — ACTIVITIES OF DAILY LIVING (ADL)
ADLS_ACUITY_SCORE: 16
ADLS_ACUITY_SCORE: 12
ADLS_ACUITY_SCORE: 12
ADLS_ACUITY_SCORE: 16
ADLS_ACUITY_SCORE: 12
ADLS_ACUITY_SCORE: 12

## 2018-11-29 NOTE — PLAN OF CARE
Problem: Patient Care Overview  Goal: Plan of Care/Patient Progress Review  Outcome: No Change  Pain managed with PO Dilaudid, unable to void, bladder scanned for 120 @ 1745, poor appetite this evening, Coreg held per parameters.

## 2018-11-29 NOTE — PLAN OF CARE
Problem: Patient Care Overview  Goal: Plan of Care/Patient Progress Review  Discharge Planner PT   Patient plan for discharge: Disch to home with help of his wife and OPPT per [facility] POC.  Current status: PT: Needs Pepper and vc for supine TOBI exercises, and bed mobility, supine to sit, where he sat x 5 min prior to becoming unresponsive and given maxA of 2 to return to supine and Nursing notified, and Rapid response code initiated.  Barriers to return to prior living situation: Postop pain, edema, weakness and current hypotensive episode.  Recommendations for discharge: Disch to home with help of his wife and OPPT per [facility]PCI POC, pending improvement.  Rationale for recommendations: Will continue to need skilled PT for recovery of functional independence, mobility, and safety.       Entered by: Ruben Hewitt 11/29/2018 8:33 AM

## 2018-11-29 NOTE — PLAN OF CARE
Problem: Patient Care Overview  Goal: Plan of Care/Patient Progress Review  Outcome: No Change  Pt alert and oriented. HR yelena upper 50's. Pt 88%-89% on RA. Pt place on oxygen 1 L at 97%  .  Voiding in the urinal small amount 100 ml . Bladder scan for 152. Encourage to drink fluids. Taking oxycodone for pain.  Pt refused to woke up for assessments and pain medication. Pt rating pain 7-8/10. This writer advised patient to take pain medication  throught the night but  Pt still refused. oxycodone for pain will continue to monitor.

## 2018-11-29 NOTE — PROGRESS NOTES
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  Hospitalist consulted for assistance with medical management       Right total hip arthroplasty:  -Patient underwent planned right hip arthroplasty by Dr. Howard on 11/27/18.  EBL 1200 mL.    -Will defer post-op management including pain control, IVF, diet, DVT prophylaxis and disposition to primary service    Near syncope/syncope:  -Patient reportedly syncopized for several seconds earlier this morning although he downplayed that episode  -Per nursing report he needed a sternal rub to get a response  -Brief episode of syncope was likely multifactorial, in the postop setting, volume loss and blood loss anemia with valvular issues and severe pulmonary hypertension  -Continue to monitor on telemetry  -Repeat echo  -Check orthostatic vitals    Acute postop on chronic anemia:  -Baseline hemoglobin appears to be between 10.5-12  -Hemoglobin dropped to 7.1, estimated blood loss was 1.2 L  -Transfuse 1 unit of PRBC today.      Ischemic cardiomyopathy w/reduced EF.   CAD s/p CABG in 2007  HTN/HLP  Paroxysmal a-fib   Supposed history of antiphospholipid antibody/anticardiolpin antibody  An echocardiogram on 7/11/18 showed EF of 40-45% with anterior, septal and apical wall akinesis. Severe pulmonary hypertension, moderate aortic stenosis, moderate mitral regurgitation.  -Continue PTA Coreg with hold parameters  -Holding Torsemide due to soft pressures   -Holding ASA, restart once okay with ortho  -Xarelto restarted 11/28  -Per nursing report has had a drop in O2 sats during sleep, will get overnight oximetry      Aortic root dilation   Last echo in July also showed the aortic sinus of Valsalva  mildly dilated at 4.3 cm and ascending aorta was borderline at 3.7 cm   -Follow up as planned       H/o Alcohol abuse   Possible alcoholic liver disease w/cirrhosis and ascites  Had been drinking 3-4 drinks/night but supposedly quit in 2015 tough an ETOH level from 10/31/18 was 0.10.  Patient carries the history of decompensated alcoholic liver cirrhosis with ascites but CT imaging of the abdomen makes no mention of cirrhosis.  He has had ultrasounds in the past that have showed ascites and has had a paracentesis as recently as 10/30/18.   -no evidence of withdrawal   -Continue to monitor.  If develops withdrawal symptoms will order CIWA.         # Pain Assessment:  Current Pain Score 11/29/2018   Patient currently in pain? -   Pain score (0-10) 0   Pain location Hip   Pain descriptors -   CPOT pain score -   defer to primary service      D/W: RN  DVT Prophylaxis: xarelto  Code Status: Full Code    Disposition: Expected discharge per primary, will need to monitor him for 1 more day given the episode earlier in the morning..    Anthony Baker MD    Interval History   Rapid response was called earlier as patient moderately syncopized although patient appears to be downplaying what happened.  He felt he was just getting up from a deep sleep overnight.  Per nursing report he passed out for a few seconds while he was on the edge of the bed prior to standing, getting ready for physical therapy.  For daily RN had to externally rub him to get a response.  -Patient denies chest pain, dyspnea, headache, focal weakness or tingling or numbness.    -Data reviewed today: I reviewed all new labs and imaging results over the last 24 hours. I personally reviewed the EKG tracing showing Controlled A. fib.      Physical Exam   Temp: 98.6  F (37  C) Temp src: Oral BP: 101/57 Pulse: 70 Heart Rate: 56 Resp: 16 SpO2: 97 % O2 Device: Oxymask Oxygen Delivery: 3 LPM  Vitals:    11/27/18 0737   Weight: 105.5 kg (232 lb 8 oz)     Vital  Signs with Ranges  Temp:  [97.5  F (36.4  C)-98.6  F (37  C)] 98.6  F (37  C)  Pulse:  [58-70] 70  Heart Rate:  [44-57] 56  Resp:  [16] 16  BP: ()/(48-87) 101/57  SpO2:  [88 %-100 %] 97 %  I/O last 3 completed shifts:  In: 2402.5 [P.O.:1250; I.V.:1152.5]  Out: 100 [Urine:100]    Constitutional: AAOX3, NAD  HEENT: No pallor or icterus  Neck- Supple, Good ROM, No JVD  Respiratory:   CTA B/L, Normal WOB  Cardiovascular: RRR, systolic murmur in the left parasternal area and aortic area   GI: Soft, Non- tender, BS- normoactive  Skin/Integument: Warm and dry, no rashes  MSK: No joint deformity or swelling, no edema  Neuro: CN- grossly intact, moving extremities appropriately      Medications       lactated ringers 75 mL/hr at 11/27/18 1844         acetaminophen  975 mg Oral Q8H     carvedilol  3.125 mg Oral BID w/meals     fluticasone  1 spray Both Nostrils Daily     gabapentin  600 mg Oral TID     rivaroxaban ANTICOAGULANT  20 mg Oral Daily     senna-docusate  1 tablet Oral BID    Or     senna-docusate  2 tablet Oral BID     sodium chloride (PF)  3 mL Intracatheter Q8H       Data       Recent Labs  Lab 11/29/18  0714 11/28/18  1239 11/28/18  0725  11/27/18  1025 11/27/18  0612   WBC  --   --   --   --  4.7  --    HGB 7.1* 7.0* Canceled, Test credited  < > 10.4*  --    MCV  --   --   --   --  96  --    PLT 91*  --   --   --  88*  --      --   --   --   --   --    POTASSIUM 4.8  --   --   --   --  3.7   CHLORIDE 101  --   --   --   --   --    CO2 28  --   --   --   --   --    BUN 21  --   --   --   --   --    CR 1.59*  --   --   --   --   --    ANIONGAP 4  --   --   --   --   --    BENNIE 8.2*  --   --   --   --   --    GLC 91  --  Canceled, Test credited  --   --   --    ALBUMIN 3.0*  --   --   --   --   --    PROTTOTAL 6.4*  --   --   --   --   --    BILITOTAL 0.8  --   --   --   --   --    ALKPHOS 65  --   --   --   --   --    ALT 10  --   --   --   --   --    AST 25  --   --   --   --   --    < > = values in  this interval not displayed.    No results found for this or any previous visit (from the past 24 hour(s)).

## 2018-11-29 NOTE — PLAN OF CARE
Problem: Patient Care Overview  Goal: Plan of Care/Patient Progress Review  OT: pt had unresponsive episode with PT, RRT was called, not appropriate for OT today

## 2018-11-29 NOTE — PROGRESS NOTES
Pt called nurse around 0615. Pt upset about the oximetry  alarm going off frequently. He ripped off the nasal canula in two pieces and took off the oximetry out of his finger. He was on oxygen 1 L at 96% and 86%-89% on RA. This writer try to educate patient but pt refused to listing.  Pt refused  To be reassess. Will continue to monitor

## 2018-11-29 NOTE — CODE/RAPID RESPONSE
[facility]    RRT Note  11/29/2018   Time Called: 0827    RRT called for: syncope, hypotension    Assessment & Plan   IMPRESSION & PLAN:  Pt was working w/ PT, was sitting on edge of bed for 4-5 minutes prior to standing and w/o warning (no c/o malaise, presyncope, CP, SOB) pt had syncopal episode w/ LOC confirmed by PT and RN who had to sternally rub him to get a response. Pt was laid supine, BP was 79/42, HR 72, SpO2 97% on face mask.  He had refused O2 overnight.    DDx: med related (recently received 4mg po dilaudid), vagal response, valvular (known aortic stenosis and MR), hypovolemia (RN reporting low uo, little on bladder scan, large ebl, 1200mL, low normal BPs despite holding coreg & diuretic), worsening RV function/pulm htn, PE less likely since he was restarted on rivaroxaban on evening 11/28, ACS (less likely given lack of CP but +risk factors), arrythmia (no c/o palpitations, pt was not on tele), recurrent syncope, has done this in the past    INTERVENTIONS:  -reestablish IV access  -gluc 102  -ekg stat  -transfuse 1 RBC in lieu of crystalloid given RVSP 71 in 7/2018  -echo, reeval valvular disease and RV  -telemetry monitoring  -orthostatic BPs  -CMP now  -recheck platelets  -consider decreasing dose of po dilaudid in opioid naive patient    Discussed with and defer further cares to Dr. Anthony Baker, hospitalist physician    Interval History     Antonio Ramirez is a 73 year old male who was admitted on 11/27/2018 for a right total hip arthroplasty on 11/27/18.      Medical history significant for: \  Past Medical History:   Diagnosis Date     Alcoholism (H)      Amputated left leg (H)      Anemia      Anticardiolipin antibody syndrome (H)      Antiphospholipid antibody syndrome (H)      Antiplatelet or antithrombotic long-term use      Aortic root dilatation (H)      Aortic stenosis      Arthritis      Balance problem      Coronary artery disease     CABG 2007: SVG to LAD, SVG to  "diagonal, SVG to OM; cardiac cath 5/17/18: thrombectomy for restenosis of stents-sent for urgent CABG, cath 5/14/2007: GRECIA x2 to LAD, Grecia to diagonal     Gastro-oesophageal reflux disease      Heart attack (H) 2007    apparently arrested, cardiac X5     Hiatal hernia      Hypercholesterolemia      Hypertension      Ischemic cardiomyopathy      Left foot drop      Liver disease     alcoholic liver disease, alcoholic cirrohsosis, portal hypertension     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)     ?When diagnosed, patient not aware of this     Moderate mitral regurgitation      Monoclonal gammopathy      Neuropathy      Noninfectious ileitis     diverticulitis of colon     Nonrheumatic tricuspid valve regurgitation      Paroxysmal atrial fibrillation (H)      PE (pulmonary embolism) 2006    saddle emboli 2006     Prostate cancer (H) 2000    Treated with radiation (seed implants in 2000) -- no problems since     Pulmonary hypertension (H)      Renal disease     kidney stones     Syncope      Thrombocytopenia (H)      Urethral stricture      Venous thrombosis     IVC filter and lysis procedures 2007     Code Status: Full Code    Allergies   Allergies   Allergen Reactions     Ciprofloxacin Itching     Severe itching \"like ants were crawling\"     Hmg-Coa-R Inhibitors Other (See Comments)     Rhabdomyolysis occurred within a couple days       Physical Exam   Vital Signs with Ranges:  Temp:  [97.5  F (36.4  C)-98.6  F (37  C)] 98.6  F (37  C)  Pulse:  [58-70] 70  Heart Rate:  [44-67] 56  Resp:  [16] 16  BP: ()/(48-64) 94/56  SpO2:  [88 %-100 %] (P) 97 %  I/O last 3 completed shifts:  In: 2402.5 [P.O.:1250; I.V.:1152.5]  Out: 100 [Urine:100]    General: elderly man, pale appearing, lying in bed w/o acute distress  Neuro: +follows commands wiggle toes on R and stump on R, 2 fingers bilat, face symmetric, tongue midline, speech fluent  HEENT: NC/AT, pupils equal round 3mm briskly reactive bilat, sclera " nonicteric, noninjected, conjunctiva pink, dry oral mucosa  Neck: supple  Heart: S1S2 3/6 murmur heard diffusely across chest, initially hypotensive, improved to normotensive w/ supine positioning  Lungs: CTAB upper  lobes  Abd:normoactive bowel sounds, soft, nontender, nondisteded  Ext: R thigh (operative side) firmer than L, L BKA    Data     EKG:  Interpreted by Shirley Arvizu  Time reviewed: 0843  Symptoms at time of EKG: s/p syncope   Rhythm: atrial fibrillation - controlled  Rate: Normal  Axis: Left Axis Deviation  Ectopy: none  Conduction: normal  ST Segments/ T Waves: No ST-T wave changes and No acute ischemic changes  Q Waves: none  Comparison to prior: Unchanged from prior on 10/31/18    Troponin:    Recent Labs   Lab Test  10/31/18   1742   TROPI  0.023     CBC with Diff:  Recent Labs   Lab Test  11/29/18   0714   11/27/18   1025   10/30/18   1324   WBC   --    --   4.7   < >   --    HGB  7.1*   < >  10.4*   < >   --    MCV   --    --   96   < >   --    PLT   --    --   88*   < >  82*   INR   --    --    --    --   1.46*    < > = values in this interval not displayed.      Comprehensive Metabolic Panel:    Recent Labs  Lab 11/28/18  0725 11/27/18  0612   POTASSIUM  --  3.7   GLC Canceled, Test credited  --        INR:    Recent Labs   Lab Test  10/30/18   1324   INR  1.46*   Time Spent on this Encounter   I spent 35 minutes from [id number] on the unit/floor managing the care of Antonio Ramirez. Over 50% of my time was spent counseling the patient and/or coordinating care regarding services listed in this note.    Shirley Arvizu, [facility]  [phone]

## 2018-11-29 NOTE — PROGRESS NOTES
"Antonio Ramirez  2018  POD # 2 sp jori    Admit Date:  2018      Normal healing wound.  No excessive bleeding  Pain well-controlled.  Objective:  Blood pressure 101/57, pulse 70, temperature 98.6  F (37  C), temperature source Oral, resp. rate 16, height 1.88 m (6' 2\"), weight 105.5 kg (232 lb 8 oz), SpO2 97 %.    Temperatures:  Current - Temp: 98.6  F (37  C); Max - Temp  Av.8  F (36.6  C)  Min: 97.5  F (36.4  C)  Max: 98.6  F (37  C)  Pulse range: Pulse  Av  Min: 58  Max: 70  Blood pressure range: Systolic (24hrs), Av , Min:90 , Max:127   ; Diastolic (24hrs), Av, Min:48, Max:87    Exam:  CMS: intact  alert, stable, wound ok  Pt had rapid response this am due to resp arrest this am . He is now resting with no breathing difficulty.       Labs:  Recent Labs   Lab Test  18   0714  18   0612  18   1701   POTASSIUM  4.8  3.7  4.6     Recent Labs   Lab Test  18   0714  18   1239  18   0725   HGB  7.1*  7.0*  Canceled, Test credited     Recent Labs   Lab Test  10/30/18   1324  18   0700  18   0807   INR  1.46*  1.68*  1.39*     Recent Labs   Lab Test  18   0714  18   1025  18   1701   PLT  91*  88*  102*       PLAN: Weight bearing as tolerated  Continue physical therapy  Pain control measures  Additional problems to be followed by medicine physician  Decrease narcotics. Transfuse 1 unit PRBC.     "

## 2018-11-30 ENCOUNTER — APPOINTMENT (OUTPATIENT)
Dept: PHYSICAL THERAPY | Facility: CLINIC | Age: 73
DRG: 470 | End: 2018-11-30
Attending: ORTHOPAEDIC SURGERY
Payer: MEDICARE

## 2018-11-30 ENCOUNTER — APPOINTMENT (OUTPATIENT)
Dept: OCCUPATIONAL THERAPY | Facility: CLINIC | Age: 73
DRG: 470 | End: 2018-11-30
Attending: ORTHOPAEDIC SURGERY
Payer: MEDICARE

## 2018-11-30 LAB
AMMONIA PLAS-SCNC: 31 UMOL/L (ref 10–50)
ANION GAP SERPL CALCULATED.3IONS-SCNC: 6 MMOL/L (ref 3–14)
BUN SERPL-MCNC: 29 MG/DL (ref 7–30)
CALCIUM SERPL-MCNC: 8.5 MG/DL (ref 8.5–10.1)
CHLORIDE SERPL-SCNC: 102 MMOL/L (ref 94–109)
CO2 SERPL-SCNC: 25 MMOL/L (ref 20–32)
CREAT SERPL-MCNC: 2.07 MG/DL (ref 0.66–1.25)
ERYTHROCYTE [DISTWIDTH] IN BLOOD BY AUTOMATED COUNT: 17.3 % (ref 10–15)
GFR SERPL CREATININE-BSD FRML MDRD: 31 ML/MIN/1.7M2
GLUCOSE BLDC GLUCOMTR-MCNC: 122 MG/DL (ref 70–99)
GLUCOSE SERPL-MCNC: 88 MG/DL (ref 70–99)
HCT VFR BLD AUTO: 24.1 % (ref 40–53)
HGB BLD-MCNC: 7.3 G/DL (ref 13.3–17.7)
HGB BLD-MCNC: 7.9 G/DL (ref 13.3–17.7)
MCH RBC QN AUTO: 31.3 PG (ref 26.5–33)
MCHC RBC AUTO-ENTMCNC: 32.8 G/DL (ref 31.5–36.5)
MCV RBC AUTO: 96 FL (ref 78–100)
PLATELET # BLD AUTO: 101 10E9/L (ref 150–450)
POTASSIUM SERPL-SCNC: 4.9 MMOL/L (ref 3.4–5.3)
RBC # BLD AUTO: 2.52 10E12/L (ref 4.4–5.9)
SODIUM SERPL-SCNC: 133 MMOL/L (ref 133–144)
WBC # BLD AUTO: 6.2 10E9/L (ref 4–11)

## 2018-11-30 PROCEDURE — 40000275 ZZH STATISTIC RCP TIME EA 10 MIN

## 2018-11-30 PROCEDURE — A9270 NON-COVERED ITEM OR SERVICE: HCPCS | Mod: GY | Performed by: ORTHOPAEDIC SURGERY

## 2018-11-30 PROCEDURE — 12000007 ZZH R&B INTERMEDIATE

## 2018-11-30 PROCEDURE — 85018 HEMOGLOBIN: CPT | Performed by: NURSE PRACTITIONER

## 2018-11-30 PROCEDURE — 85027 COMPLETE CBC AUTOMATED: CPT | Performed by: INTERNAL MEDICINE

## 2018-11-30 PROCEDURE — 40000133 ZZH STATISTIC OT WARD VISIT: Performed by: OCCUPATIONAL THERAPIST

## 2018-11-30 PROCEDURE — 25000128 H RX IP 250 OP 636: Performed by: PHYSICIAN ASSISTANT

## 2018-11-30 PROCEDURE — 94762 N-INVAS EAR/PLS OXIMTRY CONT: CPT

## 2018-11-30 PROCEDURE — 25000131 ZZH RX MED GY IP 250 OP 636 PS 637: Mod: GY | Performed by: ORTHOPAEDIC SURGERY

## 2018-11-30 PROCEDURE — A9270 NON-COVERED ITEM OR SERVICE: HCPCS | Mod: GY

## 2018-11-30 PROCEDURE — 80048 BASIC METABOLIC PNL TOTAL CA: CPT | Performed by: INTERNAL MEDICINE

## 2018-11-30 PROCEDURE — 99232 SBSQ HOSP IP/OBS MODERATE 35: CPT | Performed by: INTERNAL MEDICINE

## 2018-11-30 PROCEDURE — 25000132 ZZH RX MED GY IP 250 OP 250 PS 637: Mod: GY | Performed by: ORTHOPAEDIC SURGERY

## 2018-11-30 PROCEDURE — 36415 COLL VENOUS BLD VENIPUNCTURE: CPT | Performed by: INTERNAL MEDICINE

## 2018-11-30 PROCEDURE — 25000132 ZZH RX MED GY IP 250 OP 250 PS 637: Mod: GY | Performed by: NURSE PRACTITIONER

## 2018-11-30 PROCEDURE — 25000128 H RX IP 250 OP 636: Performed by: INTERNAL MEDICINE

## 2018-11-30 PROCEDURE — 97530 THERAPEUTIC ACTIVITIES: CPT | Mod: GP | Performed by: PHYSICAL THERAPIST

## 2018-11-30 PROCEDURE — 40000193 ZZH STATISTIC PT WARD VISIT: Performed by: PHYSICAL THERAPIST

## 2018-11-30 PROCEDURE — 82140 ASSAY OF AMMONIA: CPT | Performed by: HOSPITALIST

## 2018-11-30 PROCEDURE — 00000146 ZZHCL STATISTIC GLUCOSE BY METER IP

## 2018-11-30 PROCEDURE — 99207 ZZC APP CREDIT; MD BILLING SHARED VISIT: CPT | Performed by: NURSE PRACTITIONER

## 2018-11-30 PROCEDURE — 97530 THERAPEUTIC ACTIVITIES: CPT | Mod: GO | Performed by: OCCUPATIONAL THERAPIST

## 2018-11-30 PROCEDURE — 36415 COLL VENOUS BLD VENIPUNCTURE: CPT | Performed by: NURSE PRACTITIONER

## 2018-11-30 PROCEDURE — 97110 THERAPEUTIC EXERCISES: CPT | Mod: GP | Performed by: PHYSICAL THERAPIST

## 2018-11-30 PROCEDURE — 97116 GAIT TRAINING THERAPY: CPT | Mod: GP | Performed by: PHYSICAL THERAPIST

## 2018-11-30 PROCEDURE — A9270 NON-COVERED ITEM OR SERVICE: HCPCS | Mod: GY | Performed by: NURSE PRACTITIONER

## 2018-11-30 PROCEDURE — 25000132 ZZH RX MED GY IP 250 OP 250 PS 637: Mod: GY

## 2018-11-30 RX ORDER — GABAPENTIN 600 MG/1
600 TABLET ORAL 2 TIMES DAILY
Status: DISCONTINUED | OUTPATIENT
Start: 2018-11-30 | End: 2018-12-05 | Stop reason: HOSPADM

## 2018-11-30 RX ORDER — BISACODYL 5 MG
15 TABLET, DELAYED RELEASE (ENTERIC COATED) ORAL DAILY PRN
Status: DISCONTINUED | OUTPATIENT
Start: 2018-11-30 | End: 2018-12-05 | Stop reason: HOSPADM

## 2018-11-30 RX ORDER — BISACODYL 5 MG
5 TABLET, DELAYED RELEASE (ENTERIC COATED) ORAL DAILY PRN
Status: DISCONTINUED | OUTPATIENT
Start: 2018-11-30 | End: 2018-12-05 | Stop reason: HOSPADM

## 2018-11-30 RX ORDER — BISACODYL 10 MG
10 SUPPOSITORY, RECTAL RECTAL DAILY PRN
Status: DISCONTINUED | OUTPATIENT
Start: 2018-11-30 | End: 2018-12-05 | Stop reason: HOSPADM

## 2018-11-30 RX ORDER — POLYETHYLENE GLYCOL 3350 17 G/17G
17 POWDER, FOR SOLUTION ORAL DAILY PRN
Status: DISCONTINUED | OUTPATIENT
Start: 2018-11-30 | End: 2018-12-05 | Stop reason: HOSPADM

## 2018-11-30 RX ORDER — BISACODYL 5 MG
10 TABLET, DELAYED RELEASE (ENTERIC COATED) ORAL DAILY PRN
Status: DISCONTINUED | OUTPATIENT
Start: 2018-11-30 | End: 2018-12-05 | Stop reason: HOSPADM

## 2018-11-30 RX ADMIN — SODIUM CHLORIDE 250 ML: 9 INJECTION, SOLUTION INTRAVENOUS at 21:25

## 2018-11-30 RX ADMIN — FLUTICASONE PROPIONATE 1 SPRAY: 50 SPRAY, METERED NASAL at 10:08

## 2018-11-30 RX ADMIN — BISACODYL 5 MG: 5 TABLET, COATED ORAL at 17:11

## 2018-11-30 RX ADMIN — SENNOSIDES AND DOCUSATE SODIUM 2 TABLET: 8.6; 5 TABLET ORAL at 20:18

## 2018-11-30 RX ADMIN — ONDANSETRON 4 MG: 4 TABLET, ORALLY DISINTEGRATING ORAL at 18:39

## 2018-11-30 RX ADMIN — GABAPENTIN 600 MG: 600 TABLET, FILM COATED ORAL at 10:08

## 2018-11-30 RX ADMIN — ACETAMINOPHEN 975 MG: 325 TABLET, FILM COATED ORAL at 05:45

## 2018-11-30 RX ADMIN — SODIUM CHLORIDE 500 ML: 9 INJECTION, SOLUTION INTRAVENOUS at 10:11

## 2018-11-30 RX ADMIN — GABAPENTIN 600 MG: 600 TABLET, FILM COATED ORAL at 20:18

## 2018-11-30 RX ADMIN — RIVAROXABAN 15 MG: 15 TABLET, FILM COATED ORAL at 17:09

## 2018-11-30 ASSESSMENT — ACTIVITIES OF DAILY LIVING (ADL)
ADLS_ACUITY_SCORE: 16
ADLS_ACUITY_SCORE: 16
ADLS_ACUITY_SCORE: 17
ADLS_ACUITY_SCORE: 15
ADLS_ACUITY_SCORE: 17
ADLS_ACUITY_SCORE: 17

## 2018-11-30 NOTE — PLAN OF CARE
Problem: Patient Care Overview  Goal: Plan of Care/Patient Progress Review  Outcome: No Change  A&O. VSS ex on 2L O2. Up with A2 & prosthetic. CMS intact with baseline neuropathy IN RLE. Dressing CDI. Regular diet. IV-SL. Tele A-fib CVR. Low urine output, Bladder scan 245. Denies pain. Continue to monitor pateint

## 2018-11-30 NOTE — PLAN OF CARE
Problem: Patient Care Overview  Goal: Plan of Care/Patient Progress Review  Discharge Planner OT   Patient plan for discharge: discharge to home with help of wife, per BPCI plan  Current status: Pt able to recall 1 hip precaution, reviewed hip precautions. MaxA of 2 sit to stand with 2WW and with bed raised.  Pt needed 3 attempts to come to stand first stand.  ModA of 2 to stand 2nd time.  Pt stood EOB x4 minutes. LLE very unstable with all transfer and standing activity, needing frequent therapist support to remain extended.   When asked to sidestep to HOB, pt's LLE collapsed and he sat back on bed heavily.  Unable to attempt safe pivot transfer.  Pt able to scoot bottom to HOB with VC and Devon of 2.  Sit to supine ModA of 1. Pt asking frequently about why he was moved rooms.  Barriers to return to prior living situation: cognition, assist needed for all ADLs and transfers, hip precautions, poor activity tolerance  Recommendations for discharge: discharge to home with help of wife per BPCI plan, pending improvement  Rationale for recommendations: patient would benefit from continued therapy to increase safety and independence with ADLs/transfers        Entered by: ADAM FISHMAN 11/30/2018 9:31 AM

## 2018-11-30 NOTE — PLAN OF CARE
Problem: Patient Care Overview  Goal: Plan of Care/Patient Progress Review  Outcome: No Change  Pt is AAOx3, not to time, confused and forgetful at times, neuro intact at baseline, neuropathy on RLE, CMS intact, VSS - BP soft, sleep study completed overnight, desat to 87-90% intermittently on RA, >93% on 1L. Tele afib CVR. Denies pain.   Pt has low output bladder scan at 06am 131cc, denies s/s, very poor intake, abdomen slightly distended, soft nontender, edema BLLE +3. Dr. Richardson notifed of pt's status.   Will cont to monitor.

## 2018-11-30 NOTE — CODE/RAPID RESPONSE
[facility]    RRT Note  11/30/2018   Time Called: 325    RRT called for: near syncope    Assessment & Plan   IMPRESSION & PLAN:  Near syncopal episode after working w/ PT and walking 8 feet, decreased LOC, slow to respond.  Pt denies SOB, CP, LOC    DDx includes vagal episode, bleeding/hypovolemia, med related (?neurontin) arrhythmia (pt was on tele, no abnl noted aside from chr AF), VTE less likely since he's on rivaroxaban, severe pulm htn related, etoh w/d seizures (hasn't had historically and no postictal period), hypoxia (noted to be mid 90s prior to walking w/ PT on RA, post event SpO2 99% on 2L), less included to think of valvular disease given no significant change from recent echo    INTERVENTIONS:  -gluc 122  -check hgb stat -->7.3g (will not transfuse since he's normotensive, has pulm htn, received vol earlier today)  -orthostatic BPs --> neg  --will d/w pharmacy renally dosing neurontin  --increase bowel regimen  -in absence of any active cardiopulm c/o will hold off on getting ekg (had one yesterday, see my RRT note)  --he may have lower BPs at baseline w/ chr liver disease    Discussed with and defer further cares to [facility] hospitalist Dr. Corrales    Interval History     Antonio Ramirez is a 73 year old male who was admitted on 11/27/2018 for right total hip arthroplasty on 11/27/18.      Medical history significant for:   Past Medical History:   Diagnosis Date     Alcoholism (H)      Amputated left leg (H)      Anemia      Anticardiolipin antibody syndrome (H)      Antiphospholipid antibody syndrome (H)      Antiplatelet or antithrombotic long-term use      Aortic root dilatation (H)      Aortic stenosis      Arthritis      Balance problem      Coronary artery disease     CABG 2007: SVG to LAD, SVG to diagonal, SVG to OM; cardiac cath 5/17/18: thrombectomy for restenosis of stents-sent for urgent CABG, cath 5/14/2007: GRECIA x2 to LAD, Grecia to diagonal     Gastro-oesophageal reflux  "disease      Heart attack (H) 2007    apparently arrested, cardiac X5     Hiatal hernia      Hypercholesterolemia      Hypertension      Ischemic cardiomyopathy      Left foot drop      Liver disease     alcoholic liver disease, alcoholic cirrohsosis, portal hypertension     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)     ?When diagnosed, patient not aware of this     Moderate mitral regurgitation      Monoclonal gammopathy      Neuropathy      Noninfectious ileitis     diverticulitis of colon     Nonrheumatic tricuspid valve regurgitation      Paroxysmal atrial fibrillation (H)      PE (pulmonary embolism) 2006    saddle emboli 2006     Prostate cancer (H) 2000    Treated with radiation (seed implants in 2000) -- no problems since     Pulmonary hypertension (H)      Renal disease     kidney stones     Syncope      Thrombocytopenia (H)      Urethral stricture      Venous thrombosis     IVC filter and lysis procedures 2007     Code Status: Full Code    Allergies   Allergies   Allergen Reactions     Ciprofloxacin Itching     Severe itching \"like ants were crawling\"     Hmg-Coa-R Inhibitors Other (See Comments)     Rhabdomyolysis occurred within a couple days       Physical Exam   Vital Signs with Ranges:  Temp:  [98  F (36.7  C)-98.5  F (36.9  C)] 98.4  F (36.9  C)  Pulse:  [62-75] 63  Heart Rate:  [57-63] 58  Resp:  [14-18] 16  BP: ()/(40-69) 113/59  SpO2:  [88 %-100 %] 98 %  I/O last 3 completed shifts:  In: 1100 [P.O.:450; I.V.:650]  Out: 100 [Urine:100]    Neuro: +follows commands wiggle toes and show 2 fingers bilat, face symmetric, tongue midline, speech fluent  HEENT: NC/AT  Neck: supple  Heart: S1S2 3/6 murmur diffusely across chest  Lungs: CTAB upper lobes  Abd:normoactive bowel sounds, soft, nontender, nondisteded  Ext: no edema  General: pale appearing    Data     Troponin:    Recent Labs   Lab Test  10/31/18   1742   TROPI  0.023     CBC with Diff:  Recent Labs   Lab Test  11/30/18   0724   " 10/30/18   1324   WBC  6.2   < >   --    HGB  7.9*   < >   --    MCV  96   < >   --    PLT  101*   < >  82*   INR   --    --   1.46*    < > = values in this interval not displayed.           Comprehensive Metabolic Panel:    Recent Labs  Lab 11/30/18  0724 11/29/18  0714    133   POTASSIUM 4.9 4.8   CHLORIDE 102 101   CO2 25 28   ANIONGAP 6 4   GLC 88 91   BUN 29 21   CR 2.07* 1.59*   GFRESTIMATED 31* 43*   GFRESTBLACK 38* 52*   BENNIE 8.5 8.2*   PROTTOTAL  --  6.4*   ALBUMIN  --  3.0*   BILITOTAL  --  0.8   ALKPHOS  --  65   AST  --  25   ALT  --  10       INR:    Recent Labs   Lab Test  10/30/18   1324   INR  1.46*       Time Spent on this Encounter   I spent 35 minutes on the unit/floor managing the care of Antonio KINGS Andersonsolange. Over 50% of my time was spent counseling the patient and/or coordinating care regarding services listed in this note.    Shirley Arvizu, [facility]  Hospitalist [place]  [phone]

## 2018-11-30 NOTE — PROVIDER NOTIFICATION
" called to check on pt, updated on pt's UO of 100, CNM=103 urine was concentrated.  Pt's wife at bedside and did let this writter know \"his urnation comes and goes.  At times he voids a lot and at other times he does not.\"  Relayed this to .  Also notified him of pts IVF decreased to 50cc's since approx 1400.  Foot care done and pt's wife applied lymph wraps for pt.  Pt is slowly progressing towards his goals.   "

## 2018-11-30 NOTE — PROGRESS NOTES
X cover 0509    Called by nursing for note of intermittent confusion and low urine output.  Patient with h/o ALD with cirrhosis and ascites    - will check ammonia level  - his torsemide has been on held due to soft BPs-- will defer to rounding hospitalist for resumption of diuretics

## 2018-11-30 NOTE — PROGRESS NOTES
Order entered to attempt overnight oximetry tonight by hospitalist.  Writer Instructed bedside RN on overnight oximetry & to make sure it is well communicated to night staff on documentation, criteria and testing.

## 2018-11-30 NOTE — PROGRESS NOTES
Pt was placed on overnight oximetry study with 1 lpm of oxygen. Current SpO2 97%. Will continue to monitor the pt.     Drew RUANO.

## 2018-11-30 NOTE — PROGRESS NOTES
"Antonio Ramirez  2018  POD # 3 sp jori    Admit Date:  2018      Clean wound without signs of infection.  Normal healing wound.  No excessive bleeding  Pain well-controlled.  Tolerating physical therapy and rehabilitation well.  Objective:  Blood pressure 104/40, pulse 63, temperature 98.2  F (36.8  C), temperature source Oral, resp. rate 16, height 1.88 m (6' 2\"), weight 105.5 kg (232 lb 8 oz), SpO2 93 %.    Temperatures:  Current - Temp: 98.2  F (36.8  C); Max - Temp  Av.2  F (36.8  C)  Min: 98  F (36.7  C)  Max: 98.5  F (36.9  C)  Pulse range: Pulse  Av.8  Min: 59  Max: 75  Blood pressure range: Systolic (24hrs), Av , Min:92 , Max:117   ; Diastolic (24hrs), Av, Min:40, Max:69    Exam:  CMS: intact  alert, stable, wound ok  Pt alert and oriented. Calf nontender right leg.         Labs:  Recent Labs   Lab Test  18   0724  18   0714  18   0612   POTASSIUM  4.9  4.8  3.7     Recent Labs   Lab Test  18   0724  18   0714  18   1239   HGB  7.9*  7.1*  7.0*     Recent Labs   Lab Test  10/30/18   1324  18   0700  18   0807   INR  1.46*  1.68*  1.39*     Recent Labs   Lab Test  18   0724  18   0714  18   1025   PLT  101*  91*  88*       PLAN: Weight bearing as tolerated  Continue physical therapy  Pain control measures  Additional problems to be followed by medicine physician  Concerned about possible development of withdrawal symptoms. Will discuss with medicine.       "

## 2018-11-30 NOTE — PLAN OF CARE
Problem: Patient Care Overview  Goal: Plan of Care/Patient Progress Review  Discharge Planner PT   Patient plan for discharge: Disch to home with help of his wife and OPPT per BPCI POC,  Current status: PT: Patient reports that he is feeling weaker and that he experienced L knee buckling whe up with OT.  Needs Pepper and vc for TOBI exercises, and modA and vc for bed mobility, transfers, and gait with FWW and L BKA prosthesis, 3', WBAT R with min L knee buckling, followed by w/c.  Barriers to return to prior living situation: Postop pain edema, and weakness.  Recommendations for discharge: Disch to home with help of his wife and OPPT.  Rationale for recommendations: will continue to need skilled PT for recovery of functional independence, mobility, and safety for negotiation of chosen residence.       Entered by: Ruben Hewitt 11/30/2018 1:17 PM       PM PT: (15:35)  Following ambulation 8' with modA and vc, FWW and L BK prosthesis, followed by w/c, patient sat in w/c and became lethargic and slow to respond. Nursing notified and patient returned to bed via bilateral elbow-liftA and maxA of 2.  Nursing called RRT.

## 2018-11-30 NOTE — PROGRESS NOTES
[facility]    Hospitalist Progress Note    Assessment & Plan   Antonio Ramirez is a 73 year old male with a history of pulmonary HTN, paroxysmal a fib, 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  Hospitalist consulted for assistance with medical management       Right total hip arthroplasty:  -Patient underwent planned right hip arthroplasty by Dr. Howard on 11/27/18.  EBL 1200 mL.    -Will defer post-op management including pain control, IVF, diet, DVT prophylaxis and disposition to primary service    Near syncope/syncope:  -Patient reportedly syncopized for several seconds earlier this morning although he downplayed that episode  -Per nursing report he needed a sternal rub to get a response  -Brief episode of syncope was likely multifactorial, in the postop setting, volume loss and blood loss anemia with valvular issues and severe pulmonary hypertension  -Continue to monitor on telemetry  -Repeat echo-not significantly changed from prior, appears like the wall motion abnormality mentioned on current echo was present on echo from 7/2018.  EF marginally decreased from 40-45% to 35-40%.  Moderate aortic stenosis  -No further issues with presyncope or syncope    Acute kidney injury on chronic kidney disease stage III  -He is to have a baseline chronic kidney disease although his creatinine has been fluctuating lately, was normal on October 2018.  -Creatinine worsened from 1.59-2.07, with decreased urine output overnight  -Normal saline 500 cc over 2 hours, then LR at 100 mL/h for the next 5 hours  -Recheck BMP in the morning    Acute postop on chronic anemia:  -Baseline hemoglobin appears to be between 10.5-12  -Hemoglobin dropped to 7.1, yesterday, improved with 1 unit of PRBC transfusion at 7.9 today.      Ischemic cardiomyopathy w/reduced EF.   CAD s/p CABG in  2007  HTN/HLP  Paroxysmal a-fib   Supposed history of antiphospholipid antibody/anticardiolpin antibody  An echocardiogram on 7/11/18 showed EF of 40-45% with anterior, septal and apical wall akinesis. Severe pulmonary hypertension, moderate aortic stenosis, moderate mitral regurgitation.  Repeat echo as described above  -Continue PTA Coreg with hold parameters  -Holding Torsemide due to soft pressures   -Holding ASA, restart once okay with ortho  -Xarelto restarted 11/28      Aortic root dilation   Last echo in July also showed the aortic sinus of Valsalva mildly dilated at 4.3 cm and ascending aorta was borderline at 3.7 cm   -Follow up as planned       H/o Alcohol abuse   Possible alcoholic liver disease w/cirrhosis and ascites  Had been drinking 3-4 drinks/night but supposedly quit in 2015 tough an ETOH level from 10/31/18 was 0.10.  Patient carries the history of decompensated alcoholic liver cirrhosis with ascites but CT imaging of the abdomen makes no mention of cirrhosis.  He has had ultrasounds in the past that have showed ascites and has had a paracentesis as recently as 10/30/18.   -no evidence of withdrawal   -D/w Dr Howard who expressed concern about his history of alcohol use, will continue to monitor, obviously patient not in active withdrawal at this time.           # Pain Assessment:  Current Pain Score 11/30/2018   Patient currently in pain? sleeping: patient not able to self report   Pain score (0-10) -   Pain location -   Pain descriptors -   CPOT pain score -   defer to primary service      D/W: RN  DVT Prophylaxis: xarelto  Code Status: Full Code    Disposition: Expected discharge per primary, likely 12/1    Anthony Baker MD    Interval History     No further syncope or presyncope.  Creatinine slightly worse overnight.  Decreased urine output in the last 24 hours or so.  Denies chest pain or dyspnea.  Hemoglobin stable.    -Data reviewed today: I reviewed all new labs and imaging results  over the last 24 hours. I personally reviewed the EKG tracing showing Controlled A. fib.      Physical Exam   Temp: 98.4  F (36.9  C) Temp src: Oral BP: 102/50 Pulse: 63 Heart Rate: 57 Resp: 16 SpO2: 93 % O2 Device: Nasal cannula Oxygen Delivery: 1 LPM  Vitals:    11/27/18 0737   Weight: 105.5 kg (232 lb 8 oz)     Vital Signs with Ranges  Temp:  [98  F (36.7  C)-98.5  F (36.9  C)] 98.4  F (36.9  C)  Pulse:  [59-75] 63  Heart Rate:  [57] 57  Resp:  [16-18] 16  BP: ()/(40-69) 102/50  SpO2:  [88 %-99 %] 93 %  I/O last 3 completed shifts:  In: 340 [P.O.:340]  Out: 75 [Urine:75]    Constitutional: AAOX3, NAD  Respiratory:   CTA B/L, Normal WOB  Cardiovascular: RRR, systolic murmur in the left parasternal area and aortic area   GI: Soft, Non- tender, BS- normoactive  Skin/Integument: Warm and dry, no rashes  MSK: No joint deformity or swelling, no edema  Neuro: CN- grossly intact, moving extremities appropriately      Medications       lactated ringers 100 mL/hr at 11/30/18 1242         acetaminophen  975 mg Oral Q8H     fluticasone  1 spray Both Nostrils Daily     gabapentin  600 mg Oral TID     rivaroxaban ANTICOAGULANT  15 mg Oral Daily with supper     senna-docusate  1 tablet Oral BID    Or     senna-docusate  2 tablet Oral BID     sodium chloride (PF)  3 mL Intracatheter Q8H       Data       Recent Labs  Lab 11/30/18  0724 11/29/18  0714 11/28/18  1239 11/28/18  0725  11/27/18  1025 11/27/18  0612   WBC 6.2  --   --   --   --  4.7  --    HGB 7.9* 7.1* 7.0* Canceled, Test credited  < > 10.4*  --    MCV 96  --   --   --   --  96  --    * 91*  --   --   --  88*  --     133  --   --   --   --   --    POTASSIUM 4.9 4.8  --   --   --   --  3.7   CHLORIDE 102 101  --   --   --   --   --    CO2 25 28  --   --   --   --   --    BUN 29 21  --   --   --   --   --    CR 2.07* 1.59*  --   --   --   --   --    ANIONGAP 6 4  --   --   --   --   --    BENNIE 8.5 8.2*  --   --   --   --   --    GLC 88 91  --   Canceled, Test credited  --   --   --    ALBUMIN  --  3.0*  --   --   --   --   --    PROTTOTAL  --  6.4*  --   --   --   --   --    BILITOTAL  --  0.8  --   --   --   --   --    ALKPHOS  --  65  --   --   --   --   --    ALT  --  10  --   --   --   --   --    AST  --  25  --   --   --   --   --    < > = values in this interval not displayed.    No results found for this or any previous visit (from the past 24 hour(s)).

## 2018-11-30 NOTE — PLAN OF CARE
Problem: Patient Care Overview  Goal: Plan of Care/Patient Progress Review  Outcome: Improving  VSS on 2L/NC, patient removes O2 frequently, voided in urinal and has been incontinent, appetite good breakfast and lunch, refused uma meal, unresponsive episode with PT this am, RRT, now on tele, A-fib with CVR, 1 unit PRBC given for hgb 7.1, up with PT this afternoon, stood at BS with assist 2/walker, unable to assess standing BP due to pain, orthotics here, new socks for prosthetic, A+O, cooperative.

## 2018-12-01 ENCOUNTER — APPOINTMENT (OUTPATIENT)
Dept: OCCUPATIONAL THERAPY | Facility: CLINIC | Age: 73
DRG: 470 | End: 2018-12-01
Attending: ORTHOPAEDIC SURGERY
Payer: MEDICARE

## 2018-12-01 ENCOUNTER — APPOINTMENT (OUTPATIENT)
Dept: PHYSICAL THERAPY | Facility: CLINIC | Age: 73
DRG: 470 | End: 2018-12-01
Attending: ORTHOPAEDIC SURGERY
Payer: MEDICARE

## 2018-12-01 LAB
ABO + RH BLD: NORMAL
ABO + RH BLD: NORMAL
ANION GAP SERPL CALCULATED.3IONS-SCNC: 4 MMOL/L (ref 3–14)
BLD GP AB SCN SERPL QL: NORMAL
BLD PROD TYP BPU: NORMAL
BLD PROD TYP BPU: NORMAL
BLD UNIT ID BPU: 0
BLOOD BANK CMNT PATIENT-IMP: NORMAL
BLOOD PRODUCT CODE: NORMAL
BPU ID: NORMAL
BUN SERPL-MCNC: 35 MG/DL (ref 7–30)
CALCIUM SERPL-MCNC: 8.5 MG/DL (ref 8.5–10.1)
CHLORIDE SERPL-SCNC: 102 MMOL/L (ref 94–109)
CO2 SERPL-SCNC: 26 MMOL/L (ref 20–32)
CREAT SERPL-MCNC: 2.09 MG/DL (ref 0.66–1.25)
ERYTHROCYTE [DISTWIDTH] IN BLOOD BY AUTOMATED COUNT: 16.7 % (ref 10–15)
GFR SERPL CREATININE-BSD FRML MDRD: 31 ML/MIN/1.7M2
GLUCOSE SERPL-MCNC: 102 MG/DL (ref 70–99)
HCT VFR BLD AUTO: 21.3 % (ref 40–53)
HGB BLD-MCNC: 7.1 G/DL (ref 13.3–17.7)
MCH RBC QN AUTO: 31.1 PG (ref 26.5–33)
MCHC RBC AUTO-ENTMCNC: 33.3 G/DL (ref 31.5–36.5)
MCV RBC AUTO: 93 FL (ref 78–100)
NUM BPU REQUESTED: 3
PLATELET # BLD AUTO: 94 10E9/L (ref 150–450)
POTASSIUM SERPL-SCNC: 4.7 MMOL/L (ref 3.4–5.3)
RBC # BLD AUTO: 2.28 10E12/L (ref 4.4–5.9)
SODIUM SERPL-SCNC: 132 MMOL/L (ref 133–144)
SPECIMEN EXP DATE BLD: NORMAL
TRANSFUSION STATUS PATIENT QL: NORMAL
TRANSFUSION STATUS PATIENT QL: NORMAL
WBC # BLD AUTO: 6.2 10E9/L (ref 4–11)

## 2018-12-01 PROCEDURE — 97530 THERAPEUTIC ACTIVITIES: CPT | Mod: GP | Performed by: PHYSICAL THERAPIST

## 2018-12-01 PROCEDURE — 12000007 ZZH R&B INTERMEDIATE

## 2018-12-01 PROCEDURE — 99232 SBSQ HOSP IP/OBS MODERATE 35: CPT | Performed by: INTERNAL MEDICINE

## 2018-12-01 PROCEDURE — 25000128 H RX IP 250 OP 636: Performed by: INTERNAL MEDICINE

## 2018-12-01 PROCEDURE — 40000133 ZZH STATISTIC OT WARD VISIT: Performed by: OCCUPATIONAL THERAPY ASSISTANT

## 2018-12-01 PROCEDURE — 25000132 ZZH RX MED GY IP 250 OP 250 PS 637: Mod: GY

## 2018-12-01 PROCEDURE — A9270 NON-COVERED ITEM OR SERVICE: HCPCS | Mod: GY

## 2018-12-01 PROCEDURE — 80048 BASIC METABOLIC PNL TOTAL CA: CPT | Performed by: INTERNAL MEDICINE

## 2018-12-01 PROCEDURE — A9270 NON-COVERED ITEM OR SERVICE: HCPCS | Mod: GY | Performed by: ORTHOPAEDIC SURGERY

## 2018-12-01 PROCEDURE — 85027 COMPLETE CBC AUTOMATED: CPT | Performed by: INTERNAL MEDICINE

## 2018-12-01 PROCEDURE — 25000132 ZZH RX MED GY IP 250 OP 250 PS 637: Mod: GY | Performed by: PHYSICIAN ASSISTANT

## 2018-12-01 PROCEDURE — A9270 NON-COVERED ITEM OR SERVICE: HCPCS | Mod: GY | Performed by: NURSE PRACTITIONER

## 2018-12-01 PROCEDURE — 97110 THERAPEUTIC EXERCISES: CPT | Mod: GP | Performed by: PHYSICAL THERAPIST

## 2018-12-01 PROCEDURE — 97530 THERAPEUTIC ACTIVITIES: CPT | Mod: GO | Performed by: OCCUPATIONAL THERAPY ASSISTANT

## 2018-12-01 PROCEDURE — 25000132 ZZH RX MED GY IP 250 OP 250 PS 637: Mod: GY | Performed by: ORTHOPAEDIC SURGERY

## 2018-12-01 PROCEDURE — 40000193 ZZH STATISTIC PT WARD VISIT: Performed by: PHYSICAL THERAPIST

## 2018-12-01 PROCEDURE — 36415 COLL VENOUS BLD VENIPUNCTURE: CPT | Performed by: INTERNAL MEDICINE

## 2018-12-01 PROCEDURE — A9270 NON-COVERED ITEM OR SERVICE: HCPCS | Mod: GY | Performed by: PHYSICIAN ASSISTANT

## 2018-12-01 PROCEDURE — 25000132 ZZH RX MED GY IP 250 OP 250 PS 637: Mod: GY | Performed by: NURSE PRACTITIONER

## 2018-12-01 PROCEDURE — 99207 ZZC NON-BILLABLE SERV PER CHARTING: CPT | Performed by: NURSE PRACTITIONER

## 2018-12-01 PROCEDURE — P9016 RBC LEUKOCYTES REDUCED: HCPCS | Performed by: NURSE PRACTITIONER

## 2018-12-01 RX ORDER — LORAZEPAM 1 MG/1
1-2 TABLET ORAL EVERY 30 MIN PRN
Status: DISCONTINUED | OUTPATIENT
Start: 2018-12-01 | End: 2018-12-05 | Stop reason: HOSPADM

## 2018-12-01 RX ORDER — SODIUM CHLORIDE 9 MG/ML
INJECTION, SOLUTION INTRAVENOUS CONTINUOUS
Status: DISCONTINUED | OUTPATIENT
Start: 2018-12-01 | End: 2018-12-02

## 2018-12-01 RX ORDER — MULTIPLE VITAMINS W/ MINERALS TAB 9MG-400MCG
1 TAB ORAL DAILY
Status: DISCONTINUED | OUTPATIENT
Start: 2018-12-01 | End: 2018-12-05 | Stop reason: HOSPADM

## 2018-12-01 RX ORDER — LORAZEPAM 2 MG/ML
1-2 INJECTION INTRAMUSCULAR EVERY 30 MIN PRN
Status: DISCONTINUED | OUTPATIENT
Start: 2018-12-01 | End: 2018-12-05 | Stop reason: HOSPADM

## 2018-12-01 RX ORDER — LANOLIN ALCOHOL/MO/W.PET/CERES
100 CREAM (GRAM) TOPICAL DAILY
Status: COMPLETED | OUTPATIENT
Start: 2018-12-01 | End: 2018-12-05

## 2018-12-01 RX ORDER — FOLIC ACID 1 MG/1
1 TABLET ORAL DAILY
Status: DISCONTINUED | OUTPATIENT
Start: 2018-12-01 | End: 2018-12-05 | Stop reason: HOSPADM

## 2018-12-01 RX ADMIN — RIVAROXABAN 15 MG: 15 TABLET, FILM COATED ORAL at 16:58

## 2018-12-01 RX ADMIN — Medication 100 MG: at 20:42

## 2018-12-01 RX ADMIN — GABAPENTIN 600 MG: 600 TABLET, FILM COATED ORAL at 20:43

## 2018-12-01 RX ADMIN — SODIUM CHLORIDE: 9 INJECTION, SOLUTION INTRAVENOUS at 14:51

## 2018-12-01 RX ADMIN — SENNOSIDES AND DOCUSATE SODIUM 2 TABLET: 8.6; 5 TABLET ORAL at 20:42

## 2018-12-01 RX ADMIN — GABAPENTIN 600 MG: 600 TABLET, FILM COATED ORAL at 09:26

## 2018-12-01 RX ADMIN — HYDROMORPHONE HYDROCHLORIDE 2 MG: 2 TABLET ORAL at 07:19

## 2018-12-01 RX ADMIN — MULTIPLE VITAMINS W/ MINERALS TAB 1 TABLET: TAB at 20:43

## 2018-12-01 RX ADMIN — FOLIC ACID 1 MG: 1 TABLET ORAL at 20:43

## 2018-12-01 RX ADMIN — SENNOSIDES AND DOCUSATE SODIUM 2 TABLET: 8.6; 5 TABLET ORAL at 09:26

## 2018-12-01 ASSESSMENT — ACTIVITIES OF DAILY LIVING (ADL)
ADLS_ACUITY_SCORE: 17
ADLS_ACUITY_SCORE: 18
ADLS_ACUITY_SCORE: 17
ADLS_ACUITY_SCORE: 17
ADLS_ACUITY_SCORE: 18
ADLS_ACUITY_SCORE: 17

## 2018-12-01 NOTE — PROGRESS NOTES
Called regarding low BPs. BP stable. Did have 300 ml emesis earlier today. 250 ml bolus over 2 hrs.    Kimberly Camilo PA-C  Hospitalist Service  [phone]

## 2018-12-01 NOTE — PLAN OF CARE
Problem: Patient Care Overview  Goal: Plan of Care/Patient Progress Review  Outcome: No Change  Pt is alert/oriented x3, denies pain/discomfort. Slept well during night. LLE prosthetic, RLE PCD in place. VSS, tele Afib CVR. Pt on 3L O2 during night, pt failed to remain on RA when sleeping and keep SaO2 >88%. Pulse oximetery study completed with checks done every hour. Will continue to monitor.

## 2018-12-01 NOTE — PROVIDER NOTIFICATION
Called for low urine output. Patient voided 100 ml since 1500. Bladder scan for 131 ml. Patient had a 500 ml bolus earlier today

## 2018-12-01 NOTE — PLAN OF CARE
Problem: Patient Care Overview  Goal: Plan of Care/Patient Progress Review  Outcome: No Change  Pt has little c/o pain.  Able to sit in chair and ambulate with PT.  Pt is intermittently confused, however for the most part is oriented.  Pt is slowly progressing towards his goals.

## 2018-12-01 NOTE — PROGRESS NOTES
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  Hospitalist consulted for assistance with medical management       Right total hip arthroplasty:  -Patient underwent planned right hip arthroplasty by Dr. Howard on 11/27/18.  EBL 1200 mL.    -Will defer post-op management including pain control, IVF, diet, DVT prophylaxis per primary service    Near syncope/syncope:  -Patient reportedly syncopized for several seconds 2 days ago morning although he downplayed that episode  -Per nursing report he needed a sternal rub to get a response  -Brief episode of syncope was likely multifactorial, in the postop setting, volume loss and blood loss anemia with valvular issues and severe pulmonary hypertension  -Continue to monitor on telemetry  -Repeat echo-not significantly changed from prior, appears like the wall motion abnormality mentioned on current echo was present on echo from 7/2018.  EF marginally decreased from 40-45% to 35-40%.  Moderate aortic stenosis  -Patient had further one episode of near syncope yesterday evening on 12/30; again this is likely multifactorial, appears to be volume down, multiple valvular problems and pulmonary hypertension.  Monitor for now    Acute kidney injury on chronic kidney disease stage III  -He is to have a baseline chronic kidney disease although his creatinine has been fluctuating lately, was normal on October 2018.  -Creatinine worsened from 1.59 to 2.09, urine output slightly better but still borderline  -Transfusion as below  -Recheck BMP in the morning    Acute postop on chronic anemia:  -Baseline hemoglobin appears to be between 10.5-12  -Hemoglobin dropped to 7.1, yesterday, improved with 1 unit of  PRBC transfusion at 7.9 however has again now drifted down to 7.1.  Patient denies hematochezia or melena.  -Transfuse 1 unit of PRBC today, closely monitor CBC.      Ischemic cardiomyopathy w/reduced EF.   CAD s/p CABG in 2007  HTN/HLP  Paroxysmal a-fib   Supposed history of antiphospholipid antibody/anticardiolpin antibody  An echocardiogram on 7/11/18 showed EF of 40-45% with anterior, septal and apical wall akinesis. Severe pulmonary hypertension, moderate aortic stenosis, moderate mitral regurgitation.  Repeat echo as described above  -Blood pressures have been borderline, hold Coreg and torsemide.  -Holding ASA, restart once okay with ortho  -Xarelto restarted 11/28      Aortic root dilation   Last echo in July also showed the aortic sinus of Valsalva mildly dilated at 4.3 cm and ascending aorta was borderline at 3.7 cm   -Follow up as planned       H/o Alcohol abuse   Possible alcoholic liver disease w/cirrhosis and ascites  Had been drinking 3-4 drinks/night but supposedly quit in 2015 tough an ETOH level from 10/31/18 was 0.10.  Patient carries the history of decompensated alcoholic liver cirrhosis with ascites but CT imaging of the abdomen makes no mention of cirrhosis.  He has had ultrasounds in the past that have showed ascites and has had a paracentesis as recently as 10/30/18.   -no evidence of withdrawal   -D/w Dr Howard who expressed concern about his history of alcohol use, will continue to monitor, obviously patient not in active withdrawal at this time.           # Pain Assessment:  Current Pain Score 12/1/2018   Patient currently in pain? sleeping: patient not able to self report   Pain score (0-10) -   Pain location -   Pain descriptors -   CPOT pain score -   defer to primary service      D/W: RN  DVT Prophylaxis: xarelto  Code Status: Full Code    Disposition: Expected discharge likely in a day or 2 once medically stable  Anthony Baker MD    Interval History     Hemoglobin again dropped  to 7.1.  Patient had one further episode of near syncope yesterday evening however denies chest pain or dyspnea.  No further syncope today.  Creatinine about the same at 2.09.  Still having decreased urine output.  Patient with decreased oral intake, had one episode of emesis yesterday evening per    -Data reviewed today: I reviewed all new labs and imaging results over the last 24 hours. I personally reviewed the EKG tracing showing Controlled A. fib.      Physical Exam   Temp: 98  F (36.7  C) Temp src: Oral BP: 105/59 Pulse: 67 Heart Rate: 72 Resp: 14 SpO2: 96 % O2 Device: Nasal cannula Oxygen Delivery: 1 LPM  Vitals:    11/27/18 0737   Weight: 105.5 kg (232 lb 8 oz)     Vital Signs with Ranges  Temp:  [98  F (36.7  C)-100.8  F (38.2  C)] 98  F (36.7  C)  Pulse:  [62-67] 67  Heart Rate:  [57-72] 72  Resp:  [14-18] 14  BP: ()/(50-70) 105/59  SpO2:  [77 %-100 %] 96 %  I/O last 3 completed shifts:  In: 1000 [P.O.:350; I.V.:650]  Out: 500 [Urine:200; Emesis/NG output:300]    Constitutional: AAOX3, appears to be forgetful at the moment, NAD  Respiratory:   CTA B/L, Normal WOB  Cardiovascular: RRR, systolic murmur in the left parasternal area and aortic area   GI: Soft, Non- tender, BS- normoactive  Skin/Integument: Warm and dry, no rashes  MSK: No joint deformity or swelling, 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  Neuro: CN- grossly intact, moving extremities appropriately, no tremors      Medications           fluticasone  1 spray Both Nostrils Daily     gabapentin  600 mg Oral BID     rivaroxaban ANTICOAGULANT  15 mg Oral Daily with supper     senna-docusate  1 tablet Oral BID    Or     senna-docusate  2 tablet Oral BID     sodium chloride (PF)  3 mL Intracatheter Q8H       Data       Recent Labs  Lab 12/01/18  0622 11/30/18  1555 11/30/18  0724 11/29/18  0714  11/27/18  1025   WBC 6.2  --  6.2  --   --  4.7   HGB 7.1* 7.3* 7.9* 7.1*  < > 10.4*   MCV 93  --  96  --   --  96   PLT 94*  --  101* 91*  --   88*   *  --  133 133  --   --    POTASSIUM 4.7  --  4.9 4.8  --   --    CHLORIDE 102  --  102 101  --   --    CO2 26  --  25 28  --   --    BUN 35*  --  29 21  --   --    CR 2.09*  --  2.07* 1.59*  --   --    ANIONGAP 4  --  6 4  --   --    BENNIE 8.5  --  8.5 8.2*  --   --    *  --  88 91  < >  --    ALBUMIN  --   --   --  3.0*  --   --    PROTTOTAL  --   --   --  6.4*  --   --    BILITOTAL  --   --   --  0.8  --   --    ALKPHOS  --   --   --  65  --   --    ALT  --   --   --  10  --   --    AST  --   --   --  25  --   --    < > = values in this interval not displayed.    No results found for this or any previous visit (from the past 24 hour(s)).

## 2018-12-01 NOTE — PROVIDER NOTIFICATION
"Patient complains of \"feeling my arms are going\". He said this started a couple of weeks ago. Paged Dr Emilie Corrales advised to call house doctor or RRT if appropriate.   "

## 2018-12-01 NOTE — PLAN OF CARE
Problem: Patient Care Overview  Goal: Plan of Care/Patient Progress Review  Alert and oriented x 2. Complains of pain with repositioning. Tylenol for pain. Low urine output, bolus given. Refused removal of prosthesis. Turn and reposition q2h

## 2018-12-01 NOTE — PLAN OF CARE
Problem: Patient Care Overview  Goal: Plan of Care/Patient Progress Review  Outcome: Declining  A&OX4, VSS on RA. C/o mild pain in the epigastric region, wonder if pt is underrating pain. Declining pain meds due to liver issue. Abd distended. C/o constipation. Received a dose of PRN dolculax with no results so far. Turned and repoed Q 2hrs. Blanchable redness on the coccyx covered with mepilax dressing. Poor oral intake. IVF discontinued at 1930 pm. Had an RRT this shift. Please see NP note for more details. Tele has been anywhere from Afib with CVR to SD. Had an emesis of 300mL , tan looking, mostly undigested meal. A dose of PRN zofran given with some relief. UO of only 100mL. Evening nurse to follow up with bladder scan. Overnight oximetry to be completed at bedtime tonight. IV saline locked. On regular diet. Refuses to take off prosthesis on the LLE. Dressing on RLE c/d/i. CMS intact.Lympedema wraps on.Continue to monitor.

## 2018-12-01 NOTE — PROGRESS NOTES
"Antonio Ramirez  2018  POD # 4 sp right jori    Admit Date:  2018    Receiving one unit of blood for Hgb 7.1.  Complains that he has had spasms/tremor for quite some time.  Wondering why.      Objective:  Blood pressure 105/59, pulse 67, temperature 98  F (36.7  C), temperature source Oral, resp. rate 14, height 1.88 m (6' 2\"), weight 105.5 kg (232 lb 8 oz), SpO2 96 %.    Temperatures:  Current - Temp: 98.6  F (37  C); Max - Temp  Av.8  F (36.6  C)  Min: 97.5  F (36.4  C)  Max: 98.6  F (37  C)  Pulse range: Pulse  Av  Min: 58  Max: 70  Blood pressure range: Systolic (24hrs), Av , Min:90 , Max:127   ; Diastolic (24hrs), Av, Min:48, Max:87    Exam:  CMS: intact  alert, stable, wound ok      PLAN: Weight bearing as tolerated  Continue physical therapy  Pain control measures  Additional problems to be followed by medicine physician  Decrease narcotics. Transfuse 1 unit PRBC today per medicine.  Discharge when stable medically    "

## 2018-12-01 NOTE — PROGRESS NOTES
Pt was placed on overnight oximetry study with 1 lpm of oxygen. Current SpO2 93%. Will continue to monitor the pt.      Drew RUANO.

## 2018-12-01 NOTE — PLAN OF CARE
Problem: Patient Care Overview  Goal: Plan of Care/Patient Progress Review  Discharge Planner OT   Patient plan for discharge: discharge to home with help of wife, per BPCI plan  Current status: pt completed supine to sit EOB with mod A, assist needed to don LLE prothesis, mod A of 2 sit to stand, mod A of 2  with cues for walker safety completed bed to chair transfer. Mod A to slow descent down into chair, pt up on chair for breakfast  Barriers to return to prior living situation: cognition, assist needed for all ADLs and transfers, hip precautions, poor activity tolerance  Recommendations for discharge: discharge to home with help of wife per BPCI plan, pending improvement per plan established by the Occupational Therapist  Rationale for recommendations: patient would benefit from continued therapy to increase safety and independence with ADLs/transfers          Entered by: Saba Wang 12/01/2018 12:31 PM

## 2018-12-01 NOTE — PLAN OF CARE
"Problem: Patient Care Overview  Goal: Plan of Care/Patient Progress Review  Discharge Planner PT   Patient plan for discharge: Disch to home with help of his wife and OPPT per BPCI.  Current status: PT: Needs maxA of 2 and vc for stand-pivot transfer from bedside recliner to EOB via bilateral elbow-liftA, WBAT R and with L BK prosthesis applied.  Patient c/o feeling \"weak and shaky\".  Needs maxA of 2 with vc for sit to supine, and modA and vc for TOBI exercises.  Barriers to return to prior living situation: Postop pain, edema, and level of A needed for bed mobility, transfers, and gait.  Recommendations for discharge: Patient under BPCI POC that has him disch to home with help of his wife and OPPT.  However, disch to TCU may need to be pursued due to poor functional recovery at this time.  Rationale for recommendations: Will continue to need skilled PT, OT, and Nursing prior to returning home.       Entered by: Ruben Heiwtt 12/01/2018 12:21 PM       PM PT: (4503)  Patient needs maxA of 2 for bed mobility, and transfers to  FWW at EOB, x 1 min prior needing to be returned to supine in bed.  During same activity patient displays intermittent loss of  strength (L>R), causing to lose his hold of the walker, or the bed.  Patient's nurse present to witness same.  "

## 2018-12-02 ENCOUNTER — APPOINTMENT (OUTPATIENT)
Dept: PHYSICAL THERAPY | Facility: CLINIC | Age: 73
DRG: 470 | End: 2018-12-02
Attending: ORTHOPAEDIC SURGERY
Payer: MEDICARE

## 2018-12-02 LAB
ANION GAP SERPL CALCULATED.3IONS-SCNC: 5 MMOL/L (ref 3–14)
BUN SERPL-MCNC: 36 MG/DL (ref 7–30)
CALCIUM SERPL-MCNC: 8.5 MG/DL (ref 8.5–10.1)
CHLORIDE SERPL-SCNC: 102 MMOL/L (ref 94–109)
CO2 SERPL-SCNC: 26 MMOL/L (ref 20–32)
CREAT SERPL-MCNC: 1.61 MG/DL (ref 0.66–1.25)
ERYTHROCYTE [DISTWIDTH] IN BLOOD BY AUTOMATED COUNT: 16.6 % (ref 10–15)
GFR SERPL CREATININE-BSD FRML MDRD: 42 ML/MIN/1.7M2
GLUCOSE SERPL-MCNC: 97 MG/DL (ref 70–99)
HCT VFR BLD AUTO: 23.9 % (ref 40–53)
HGB BLD-MCNC: 8.1 G/DL (ref 13.3–17.7)
MCH RBC QN AUTO: 31.3 PG (ref 26.5–33)
MCHC RBC AUTO-ENTMCNC: 33.9 G/DL (ref 31.5–36.5)
MCV RBC AUTO: 92 FL (ref 78–100)
PLATELET # BLD AUTO: 112 10E9/L (ref 150–450)
POTASSIUM SERPL-SCNC: 4.5 MMOL/L (ref 3.4–5.3)
RBC # BLD AUTO: 2.59 10E12/L (ref 4.4–5.9)
SODIUM SERPL-SCNC: 133 MMOL/L (ref 133–144)
WBC # BLD AUTO: 6.2 10E9/L (ref 4–11)

## 2018-12-02 PROCEDURE — 25000132 ZZH RX MED GY IP 250 OP 250 PS 637: Mod: GY | Performed by: INTERNAL MEDICINE

## 2018-12-02 PROCEDURE — A9270 NON-COVERED ITEM OR SERVICE: HCPCS | Mod: GY | Performed by: ORTHOPAEDIC SURGERY

## 2018-12-02 PROCEDURE — 25000132 ZZH RX MED GY IP 250 OP 250 PS 637: Mod: GY | Performed by: PHYSICIAN ASSISTANT

## 2018-12-02 PROCEDURE — 97110 THERAPEUTIC EXERCISES: CPT | Mod: GP | Performed by: PHYSICAL THERAPIST

## 2018-12-02 PROCEDURE — 97116 GAIT TRAINING THERAPY: CPT | Mod: GP | Performed by: PHYSICAL THERAPIST

## 2018-12-02 PROCEDURE — 12000007 ZZH R&B INTERMEDIATE

## 2018-12-02 PROCEDURE — 25000132 ZZH RX MED GY IP 250 OP 250 PS 637: Mod: GY | Performed by: ORTHOPAEDIC SURGERY

## 2018-12-02 PROCEDURE — 99232 SBSQ HOSP IP/OBS MODERATE 35: CPT | Performed by: INTERNAL MEDICINE

## 2018-12-02 PROCEDURE — 40000193 ZZH STATISTIC PT WARD VISIT: Performed by: PHYSICAL THERAPIST

## 2018-12-02 PROCEDURE — 80048 BASIC METABOLIC PNL TOTAL CA: CPT | Performed by: INTERNAL MEDICINE

## 2018-12-02 PROCEDURE — 25000132 ZZH RX MED GY IP 250 OP 250 PS 637: Mod: GY | Performed by: NURSE PRACTITIONER

## 2018-12-02 PROCEDURE — 97530 THERAPEUTIC ACTIVITIES: CPT | Mod: GP | Performed by: PHYSICAL THERAPIST

## 2018-12-02 PROCEDURE — A9270 NON-COVERED ITEM OR SERVICE: HCPCS | Mod: GY | Performed by: PHYSICIAN ASSISTANT

## 2018-12-02 PROCEDURE — 36415 COLL VENOUS BLD VENIPUNCTURE: CPT | Performed by: INTERNAL MEDICINE

## 2018-12-02 PROCEDURE — 85027 COMPLETE CBC AUTOMATED: CPT | Performed by: INTERNAL MEDICINE

## 2018-12-02 PROCEDURE — A9270 NON-COVERED ITEM OR SERVICE: HCPCS | Mod: GY | Performed by: INTERNAL MEDICINE

## 2018-12-02 PROCEDURE — A9270 NON-COVERED ITEM OR SERVICE: HCPCS | Mod: GY | Performed by: NURSE PRACTITIONER

## 2018-12-02 RX ORDER — TORSEMIDE 20 MG/1
20 TABLET ORAL DAILY
Status: DISCONTINUED | OUTPATIENT
Start: 2018-12-02 | End: 2018-12-03

## 2018-12-02 RX ADMIN — GABAPENTIN 600 MG: 600 TABLET, FILM COATED ORAL at 20:38

## 2018-12-02 RX ADMIN — FOLIC ACID 1 MG: 1 TABLET ORAL at 09:34

## 2018-12-02 RX ADMIN — HYDROMORPHONE HYDROCHLORIDE 2 MG: 2 TABLET ORAL at 14:46

## 2018-12-02 RX ADMIN — TORSEMIDE 20 MG: 20 TABLET ORAL at 13:07

## 2018-12-02 RX ADMIN — SENNOSIDES AND DOCUSATE SODIUM 1 TABLET: 8.6; 5 TABLET ORAL at 20:38

## 2018-12-02 RX ADMIN — RIVAROXABAN 20 MG: 10 TABLET, FILM COATED ORAL at 17:18

## 2018-12-02 RX ADMIN — GABAPENTIN 600 MG: 600 TABLET, FILM COATED ORAL at 09:34

## 2018-12-02 RX ADMIN — HYDROMORPHONE HYDROCHLORIDE 2 MG: 2 TABLET ORAL at 09:34

## 2018-12-02 RX ADMIN — MULTIPLE VITAMINS W/ MINERALS TAB 1 TABLET: TAB at 09:34

## 2018-12-02 RX ADMIN — Medication 100 MG: at 09:34

## 2018-12-02 RX ADMIN — SENNOSIDES AND DOCUSATE SODIUM 2 TABLET: 8.6; 5 TABLET ORAL at 09:34

## 2018-12-02 ASSESSMENT — ACTIVITIES OF DAILY LIVING (ADL)
ADLS_ACUITY_SCORE: 18
ADLS_ACUITY_SCORE: 18
ADLS_ACUITY_SCORE: 17
ADLS_ACUITY_SCORE: 18
ADLS_ACUITY_SCORE: 17
ADLS_ACUITY_SCORE: 17

## 2018-12-02 NOTE — PLAN OF CARE
"Problem: Patient Care Overview  Goal: Plan of Care/Patient Progress Review  Alert and oriented x 3, forgetful. Stood up with PT. Complained of \"feeling shaky and weak\" during PT. Patient needed assistance during dinner time. Said \"things are going to slide off my hands\" neuros intact. Mepilex on coccyx for blanchable erythema. Refuses turning and repositioning. Tele a fib with CVR      "

## 2018-12-02 NOTE — PROGRESS NOTES
House Officer Brief Note:  Called regarding upper arm weakness and tremor and concern for hallucinating (seeing people). Per EHR review weakness is not new and has been present prior to this admision and is not an acute change. Per EHR patient consumes between 1 and 7- mixed drinks per day.    Assessment/Plan:  - if patient has an acute change please call RRT and we will come assess  - will add CIWA as he is potentially at risk for alcohol withdrawal    DIPAK Castillo, CNP  Hospitalist Service, House Officer  [facility]     Text Page  Pager: [phone]

## 2018-12-02 NOTE — PLAN OF CARE
Problem: Patient Care Overview  Goal: Plan of Care/Patient Progress Review  Outcome: Improving  Pt remains A/O forgetful. CIWA 0. IVSL. Generalized edema-mild +2, po torsemide given pr MD. Po dilaudid for pain. Refused to take prosthetic leg off to care for skin. bruising around incision site. Incontinent of bladder intermittently. mepilex on bottom. Plan for tcu one placed. Continue to monitor.

## 2018-12-02 NOTE — PLAN OF CARE
Problem: Patient Care Overview  Goal: Plan of Care/Patient Progress Review  Discharge Planner PT   Patient plan for discharge: Disch to home with help of his wife and OPPT.  Current status: Reports 9/10 R hip and thigh pain.  Needs modA and vc for TOBI exercises, and modA of 2 and vc for bed mobility, transfers, and gait for stand pivot transfer 2' from EOB to w/c, WBAT L, with LBK prosthesis and FWW.  Barriers to return to prior living situation:Postop pain, edema, weakness, and level of assistance needed.  Recommendations for discharge: TCU for brief stay.  Rationale for recommendations: Will continue to need skilled PT for recovery of functional independence, mobility, and safety for negotiation of chosen residence.       Entered by: Ruben Hewitt 12/02/2018 1:51 PM

## 2018-12-02 NOTE — PROGRESS NOTES
POD#5 R TOBI Dr. Howard    Poor mobilization and participation with PT.  Reports pain and heaviness of right leg  Denies CP or SOB. No N/V  AVSS  Hgb 8.1 today after 1U prbcs yesterday  PT recommending TCU  Clearly not ready for home today  Will place SW order for TCU placement and have Dr. Howard discuss with patient tomorrow to formalize disposition plan    Shahbaz Vilchis

## 2018-12-02 NOTE — PROGRESS NOTES
[facility]    Hospitalist Progress Note    Assessment & Plan   Antonio Ramirez is a 73 year old male with a history of pulmonary HTN, paroxysmal a fib, 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  Hospitalist consulted for assistance with medical management       Right total hip arthroplasty:  -Patient underwent planned right hip arthroplasty by Dr. Howard on 11/27/18.  EBL 1200 mL.    -Routine postop care per orthopedic surgery.    Near syncope/syncope:  -Patient reportedly syncopized for several seconds on 11/29 although he downplayed that episode  -Per nursing report he needed a sternal rub to get a response  -Brief episode of syncope was likely multifactorial, in the postop setting, volume loss and blood loss anemia with valvular issues and severe pulmonary hypertension  -Continue to monitor on telemetry  -Repeat echo-not significantly changed from prior, appears like the wall motion abnormality mentioned on current echo was present on echo from 7/2018.  EF marginally decreased from 40-45% to 35-40%.  Moderate aortic stenosis  -Patient had further one episode of near syncope yesterday on 12/30; again this is likely multifactorial, appears to be volume down, multiple valvular problems and pulmonary hypertension.    -No further near syncope or syncope since per  -Monitor for now    Acute kidney injury on chronic kidney disease stage III  -He is to have a baseline chronic kidney disease although his creatinine has been fluctuating lately, was normal on October 2018.  -Creatinine worsened from 1.59 to 2.09, now improving, creatinine 1.6 today    Acute postop on chronic anemia:  -Baseline hemoglobin appears to be between 10.5-12  -Received 1 additional unit of PRBC yesterday(2 since surgery)  -Hemoglobin stable at 8.1 per      Ischemic cardiomyopathy w/reduced EF.   CAD s/p CABG in  2007  HTN/HLP  Paroxysmal a-fib   Supposed history of antiphospholipid antibody/anticardiolpin antibody  An echocardiogram on 7/11/18 showed EF of 40-45% with anterior, septal and apical wall akinesis. Severe pulmonary hypertension, moderate aortic stenosis, moderate mitral regurgitation.  Repeat echo as described above  -Blood pressures have been borderline, hold Coreg   -Appears slightly volume up, will restart torsemide this morning.  -Holding ASA, restart once okay with ortho  -Xarelto restarted 11/28      Aortic root dilation   Last echo in July also showed the aortic sinus of Valsalva mildly dilated at 4.3 cm and ascending aorta was borderline at 3.7 cm   -Follow up as planned       H/o Alcohol abuse   Possible alcoholic liver disease w/cirrhosis and ascites  Had been drinking 3-4 drinks/night but supposedly quit in 2015 tough an ETOH level from 10/31/18 was 0.10.  Patient carries the history of decompensated alcoholic liver cirrhosis with ascites but CT imaging of the abdomen makes no mention of cirrhosis.  He has had ultrasounds in the past that have showed ascites and has had a paracentesis as recently as 10/30/18.   -no evidence of withdrawal at this time.      Generalized deconditioning:  -Appears to be weak overall, multifactorial  -Physical therapy recommending TCU       # Pain Assessment:  Current Pain Score 12/2/2018   Patient currently in pain? -   Pain score (0-10) 6   Pain location Leg   Pain descriptors -   CPOT pain score -   defer to primary service      D/W: RN  DVT Prophylaxis: xarelto  Code Status: Full Code    Disposition: Expected discharge; likely later today or tomorrow to TCU.    Anthony Baker MD    Interval History   Overall feels slightly better compared to yesterday although still weak overall.  Hemoglobin stable at 8.1.  Renal function better.  No hematochezia or melena.    -Data reviewed today: I reviewed all new labs and imaging results over the last 24 hours.  EKG shows  controlled A. fib.  No dysrhythmias.    Physical Exam   Temp: 98.4  F (36.9  C) Temp src: Oral BP: 128/73 Pulse: 62 Heart Rate: 68 Resp: 16 SpO2: 97 % O2 Device: None (Room air) Oxygen Delivery: 1 LPM  Vitals:    11/27/18 0737   Weight: 105.5 kg (232 lb 8 oz)     Vital Signs with Ranges  Temp:  [97.1  F (36.2  C)-99.8  F (37.7  C)] 98.4  F (36.9  C)  Pulse:  [62] 62  Heart Rate:  [64-79] 68  Resp:  [14-18] 16  BP: (104-128)/(54-73) 128/73  SpO2:  [93 %-100 %] 97 %  I/O last 3 completed shifts:  In: 806.25 [P.O.:400]  Out: 550 [Urine:550]    Constitutional: AAOX3, no hallucinations.  NAD  Respiratory:   CTA B/L, Normal WOB  Cardiovascular: RRR, systolic murmur in the left parasternal area and aortic area   GI: Soft, Non- tender, BS- normoactive  Skin/Integument: Warm and dry, no rashes  MSK: No joint deformity or swelling, 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  Neuro: CN- grossly intact, moving extremities appropriately, no tremors      Medications           fluticasone  1 spray Both Nostrils Daily     folic acid  1 mg Oral Daily     gabapentin  600 mg Oral BID     multivitamin w/minerals  1 tablet Oral Daily     rivaroxaban ANTICOAGULANT  20 mg Oral Daily with supper     senna-docusate  1 tablet Oral BID    Or     senna-docusate  2 tablet Oral BID     sodium chloride (PF)  3 mL Intracatheter Q8H     torsemide  20 mg Oral Daily     vitamin B1  100 mg Oral Daily       Data       Recent Labs  Lab 12/02/18  0652 12/01/18  0622 11/30/18  1555 11/30/18  0724 11/29/18  0714   WBC 6.2 6.2  --  6.2  --    HGB 8.1* 7.1* 7.3* 7.9* 7.1*   MCV 92 93  --  96  --    * 94*  --  101* 91*    132*  --  133 133   POTASSIUM 4.5 4.7  --  4.9 4.8   CHLORIDE 102 102  --  102 101   CO2 26 26  --  25 28   BUN 36* 35*  --  29 21   CR 1.61* 2.09*  --  2.07* 1.59*   ANIONGAP 5 4  --  6 4   BENNIE 8.5 8.5  --  8.5 8.2*   GLC 97 102*  --  88 91   ALBUMIN  --   --   --   --  3.0*   PROTTOTAL  --   --   --   --  6.4*   BILITOTAL   --   --   --   --  0.8   ALKPHOS  --   --   --   --  65   ALT  --   --   --   --  10   AST  --   --   --   --  25       No results found for this or any previous visit (from the past 24 hour(s)).

## 2018-12-02 NOTE — PLAN OF CARE
Problem: Patient Care Overview  Goal: Plan of Care/Patient Progress Review  Outcome: No Change  A/Ox3, forgetful. VSS on 1 L oxygen. Dressing is CDI. Slept through the night. Denies pain. Meplix on coccyx for blanchable erythema . Pt. Refuses repo/turn at times. Up with A2. Regular diet. Continue to monitor.

## 2018-12-02 NOTE — PROGRESS NOTES
SW:    D: FAHEEM following for discharge needs. FAHEEM informed patient request referral to [facility]. FAHEEM sent referral via DOD, to check bed availability.     P: Will continue to follow.

## 2018-12-02 NOTE — PLAN OF CARE
Problem: Patient Care Overview  Goal: Plan of Care/Patient Progress Review  OT: pt busy with other disciplines during OT scheduled time for session

## 2018-12-03 ENCOUNTER — APPOINTMENT (OUTPATIENT)
Dept: PHYSICAL THERAPY | Facility: CLINIC | Age: 73
DRG: 470 | End: 2018-12-03
Attending: ORTHOPAEDIC SURGERY
Payer: MEDICARE

## 2018-12-03 ENCOUNTER — APPOINTMENT (OUTPATIENT)
Dept: ULTRASOUND IMAGING | Facility: CLINIC | Age: 73
DRG: 470 | End: 2018-12-03
Attending: INTERNAL MEDICINE
Payer: MEDICARE

## 2018-12-03 LAB
BLD PROD TYP BPU: NORMAL
BLD UNIT ID BPU: 0
BLOOD PRODUCT CODE: NORMAL
BPU ID: NORMAL
CREAT SERPL-MCNC: 1.29 MG/DL (ref 0.66–1.25)
GFR SERPL CREATININE-BSD FRML MDRD: 54 ML/MIN/1.7M2
TRANSFUSION STATUS PATIENT QL: NORMAL
TRANSFUSION STATUS PATIENT QL: NORMAL

## 2018-12-03 PROCEDURE — A9270 NON-COVERED ITEM OR SERVICE: HCPCS | Mod: GY | Performed by: INTERNAL MEDICINE

## 2018-12-03 PROCEDURE — A9270 NON-COVERED ITEM OR SERVICE: HCPCS | Mod: GY | Performed by: NURSE PRACTITIONER

## 2018-12-03 PROCEDURE — 99232 SBSQ HOSP IP/OBS MODERATE 35: CPT | Performed by: INTERNAL MEDICINE

## 2018-12-03 PROCEDURE — 12000007 ZZH R&B INTERMEDIATE

## 2018-12-03 PROCEDURE — 25000132 ZZH RX MED GY IP 250 OP 250 PS 637: Mod: GY | Performed by: NURSE PRACTITIONER

## 2018-12-03 PROCEDURE — 93971 EXTREMITY STUDY: CPT | Mod: RT

## 2018-12-03 PROCEDURE — 36415 COLL VENOUS BLD VENIPUNCTURE: CPT | Performed by: ORTHOPAEDIC SURGERY

## 2018-12-03 PROCEDURE — 97116 GAIT TRAINING THERAPY: CPT | Mod: GP | Performed by: PHYSICAL THERAPIST

## 2018-12-03 PROCEDURE — 25000132 ZZH RX MED GY IP 250 OP 250 PS 637: Mod: GY | Performed by: INTERNAL MEDICINE

## 2018-12-03 PROCEDURE — 40000193 ZZH STATISTIC PT WARD VISIT: Performed by: PHYSICAL THERAPIST

## 2018-12-03 PROCEDURE — 25000132 ZZH RX MED GY IP 250 OP 250 PS 637: Mod: GY | Performed by: ORTHOPAEDIC SURGERY

## 2018-12-03 PROCEDURE — 25000132 ZZH RX MED GY IP 250 OP 250 PS 637: Mod: GY | Performed by: PHYSICIAN ASSISTANT

## 2018-12-03 PROCEDURE — A9270 NON-COVERED ITEM OR SERVICE: HCPCS | Mod: GY | Performed by: PHYSICIAN ASSISTANT

## 2018-12-03 PROCEDURE — 82565 ASSAY OF CREATININE: CPT | Performed by: ORTHOPAEDIC SURGERY

## 2018-12-03 PROCEDURE — A9270 NON-COVERED ITEM OR SERVICE: HCPCS | Mod: GY | Performed by: ORTHOPAEDIC SURGERY

## 2018-12-03 PROCEDURE — 97530 THERAPEUTIC ACTIVITIES: CPT | Mod: GP | Performed by: PHYSICAL THERAPIST

## 2018-12-03 RX ORDER — TORSEMIDE 20 MG/1
20 TABLET ORAL
Status: DISCONTINUED | OUTPATIENT
Start: 2018-12-03 | End: 2018-12-05 | Stop reason: HOSPADM

## 2018-12-03 RX ORDER — TORSEMIDE 20 MG/1
20 TABLET ORAL DAILY
Status: DISCONTINUED | OUTPATIENT
Start: 2018-12-04 | End: 2018-12-05 | Stop reason: HOSPADM

## 2018-12-03 RX ORDER — TORSEMIDE 20 MG/1
20 TABLET ORAL
Status: DISCONTINUED | OUTPATIENT
Start: 2018-12-03 | End: 2018-12-03

## 2018-12-03 RX ORDER — TORSEMIDE 20 MG/1
20 TABLET ORAL DAILY
Qty: 3 TABLET | Refills: 0 | DISCHARGE
Start: 2018-12-03 | End: 2018-12-06

## 2018-12-03 RX ADMIN — SENNOSIDES AND DOCUSATE SODIUM 1 TABLET: 8.6; 5 TABLET ORAL at 21:26

## 2018-12-03 RX ADMIN — TORSEMIDE 20 MG: 20 TABLET ORAL at 16:32

## 2018-12-03 RX ADMIN — Medication 100 MG: at 09:08

## 2018-12-03 RX ADMIN — HYDROMORPHONE HYDROCHLORIDE 2 MG: 2 TABLET ORAL at 19:05

## 2018-12-03 RX ADMIN — HYDROMORPHONE HYDROCHLORIDE 2 MG: 2 TABLET ORAL at 04:30

## 2018-12-03 RX ADMIN — SENNOSIDES AND DOCUSATE SODIUM 2 TABLET: 8.6; 5 TABLET ORAL at 09:08

## 2018-12-03 RX ADMIN — FOLIC ACID 1 MG: 1 TABLET ORAL at 09:08

## 2018-12-03 RX ADMIN — GABAPENTIN 600 MG: 600 TABLET, FILM COATED ORAL at 09:08

## 2018-12-03 RX ADMIN — HYDROMORPHONE HYDROCHLORIDE 2 MG: 2 TABLET ORAL at 09:11

## 2018-12-03 RX ADMIN — TORSEMIDE 20 MG: 20 TABLET ORAL at 09:08

## 2018-12-03 RX ADMIN — MULTIPLE VITAMINS W/ MINERALS TAB 1 TABLET: TAB at 09:08

## 2018-12-03 RX ADMIN — GABAPENTIN 600 MG: 600 TABLET, FILM COATED ORAL at 21:26

## 2018-12-03 RX ADMIN — RIVAROXABAN 20 MG: 10 TABLET, FILM COATED ORAL at 16:32

## 2018-12-03 RX ADMIN — HYDROMORPHONE HYDROCHLORIDE 2 MG: 2 TABLET ORAL at 13:27

## 2018-12-03 ASSESSMENT — ACTIVITIES OF DAILY LIVING (ADL)
ADLS_ACUITY_SCORE: 18
ADLS_ACUITY_SCORE: 17

## 2018-12-03 NOTE — DISCHARGE SUMMARY
Orthopedic Discharge Summary   Patient: Antonio Ramirez  Admission Date: 11/27/2018  Discharge Date: 12/3/18  Date of Service: 12/3/2018  Attending Provider: Saima Howard MD  Admission Diagnosis: DJD RIGHT HIP  S/P total hip arthroplasty  Discharge Diagnosis: right total hip replacement  Procedures Performed: right total hip replacement  Complications: None apparent   History of Present Illness: see operative report for full HPI  Past Medical History:   Past Medical History:   Diagnosis Date     Alcoholism (H)      Amputated left leg (H)      Anemia      Anticardiolipin antibody syndrome (H)      Antiphospholipid antibody syndrome (H)      Antiplatelet or antithrombotic long-term use      Aortic root dilatation (H)      Aortic stenosis      Arthritis      Balance problem      Coronary artery disease     CABG 2007: SVG to LAD, SVG to diagonal, SVG to OM; cardiac cath 5/17/18: thrombectomy for restenosis of stents-sent for urgent CABG, cath 5/14/2007: GRECIA x2 to LAD, Grecia to diagonal     Gastro-oesophageal reflux disease      Heart attack (H) 2007    apparently arrested, cardiac X5     Hiatal hernia      Hypercholesterolemia      Hypertension      Ischemic cardiomyopathy      Left foot drop      Liver disease     alcoholic liver disease, alcoholic cirrohsosis, portal hypertension     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)     ?When diagnosed, patient not aware of this     Moderate mitral regurgitation      Monoclonal gammopathy      Neuropathy      Noninfectious ileitis     diverticulitis of colon     Nonrheumatic tricuspid valve regurgitation      Paroxysmal atrial fibrillation (H)      PE (pulmonary embolism) 2006    saddle emboli 2006     Prostate cancer (H) 2000    Treated with radiation (seed implants in 2000) -- no problems since     Pulmonary hypertension (H)      Renal disease     kidney stones     Syncope      Thrombocytopenia (H)      Urethral stricture      Venous thrombosis     IVC filter  and lysis procedures 2007     Patient Active Problem List   Diagnosis     Alcohol abuse     Alcoholic cirrhosis of liver (H)     Chronic kidney disease, stage III (moderate) (H)     Physical deconditioning     Prostate cancer -- S/P Radiative Seed implants in 2000 (no problems since)     Antiphospholipid antibody syndrome (H)     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 (H)     Chronic congestive heart failure, unspecified congestive heart failure type     Thrombocytopenia -- suspect related to alcohol use     Subdural hematoma (H)     Benign essential hypertension     Malabsorption of iron     Calculi, ureter     Anemia     Coronary artery disease involving native coronary artery of native heart without angina pectoris     Aortic stenosis     Nonrheumatic mitral valve regurgitation     Nonrheumatic tricuspid valve regurgitation     Pulmonary hypertension (H)     Paroxysmal atrial fibrillation (H)     Ischemic cardiomyopathy     Aortic root dilatation (H)     Venous thrombosis     S/P total hip arthroplasty     Past Surgical History:   Procedure Laterality Date     AMPUTATE LEG BELOW KNEE Left 04/2014    related to gangrene, started as foot ulcer related to neuropathy     AMPUTATE LEG BELOW KNEE Left 12/4/2017    Procedure: AMPUTATE LEG BELOW KNEE;  Exploration of Left Leg Below Knee Amputation Stump with Ligation of Bleeders.;  Surgeon: Leandro Arreola MD;  Location:  OR     APPENDECTOMY       APPLY WOUND VAC Left 12/6/2017    Procedure: APPLY WOUND VAC;;  Surgeon: Saima Howard MD;  Location:  [facility]     ARTHROPLASTY KNEE Right 9/19/2014    Procedure: ARTHROPLASTY KNEE;  Surgeon: Saima Howard MD;  Location:  OR     CARDIAC SURGERY      CABG     CHOLECYSTECTOMY       COLONOSCOPY       COMBINED CYSTOSCOPY, RETROGRADES, EXCHANGE STENT URETER(S) Left 7/24/2018    Procedure: COMBINED CYSTOSCOPY, RETROGRADES, EXCHANGE STENT URETER(S);  CYSTOSCOPY, BILATERAL RETROGRADES, BILATERAL URETERAL STENT  EXCHANGE ;  Surgeon: Matt Cervantes MD;  Location:  OR     CRANIOTOMY Right 4/7/2018    Procedure: CRANIOTOMY;  Right Craniotomy For Subdural Hematoma;  Surgeon: Jono Issa MD;  Location:  OR     CYSTOSCOPY, DILATE URETHRA, COMBINED N/A 10/12/2016    Procedure: COMBINED CYSTOSCOPY, DILATE URETHRA;  Surgeon: Dimitry Bello MD;  Location:  OR     CYSTOSCOPY, REMOVE STENT(S), COMBINED Right 7/24/2018    Procedure: COMBINED CYSTOSCOPY, REMOVE STENT(S);;  Surgeon: Joes Hunter MD;  Location:  OR     ENDOSCOPIC STRIPPING VEIN(S)       esophagogastroduodenoscopy       EYE SURGERY      cataracts     GENITOURINARY SURGERY      Prostate Seen Implants     HERNIA REPAIR      Umbilical     INCISION AND DRAINAGE LOWER EXTREMITY, COMBINED Left 12/1/2017    Procedure: COMBINED INCISION AND DRAINAGE LOWER EXTREMITY;  INCISION AND DRAINAGE OF LEFT BELOW KNEE AMPUTATION ABSCESS, WOUND VAC PLACEMENT;  Surgeon: Saima Howard MD;  Location:  OR     IR IVC FILTER PLACEMENT       IRRIGATION AND DEBRIDEMENT LOWER EXTREMITY, COMBINED Left 12/6/2017    Procedure: COMBINED IRRIGATION AND DEBRIDEMENT LOWER EXTREMITY;  IRRIGATION AND DEBRIDEMENT OF LEFT BELOW KNEE AMPUTATION SITE WITH WOUND VAC REPLACEMENT;  Surgeon: Saima Howard MD;  Location:  [facility]     IRRIGATION AND DEBRIDEMENT LOWER EXTREMITY, COMBINED Left 12/8/2017    Procedure: COMBINED IRRIGATION AND DEBRIDEMENT LOWER EXTREMITY;  IRRIGATION AND DEBRIDEMENT OF LEFT BELOW THE KNEE AMPUTATION AND SHORTENING OF THE TIBIA WITH WOUND CLOSURE;  Surgeon: Saima Howard MD;  Location:  OR     LASER HOLMIUM LITHOTRIPSY URETER(S), INSERT STENT, COMBINED Bilateral 6/28/2018    Procedure: COMBINED CYSTOSCOPY, URETEROSCOPY, LASER HOLMIUM LITHOTRIPSY URETER(S), INSERT STENT;  CYSTOSCOPY, BILATERAL URETEROSCOPY,  HOLMIUM LASER LITHOTRIPSY, BILATERAL STENT PLACEMENT, STONE BASKETING;  Surgeon: Jose Hunter MD;  Location:  OR      LASER HOLMIUM LITHOTRIPSY URETER(S), INSERT STENT, COMBINED Left 8/14/2018    Procedure: COMBINED CYSTOSCOPY, URETEROSCOPY, LASER HOLMIUM LITHOTRIPSY URETER(S), INSERT STENT;  CYSTOSCOPY, LEFT URETEROSCOPY, LEFT LASER HOLMIUM LITHOTRIPSY URETER(S) REMOVAL BILATERAL STENTS;  Surgeon: Jose Hunter MD;  Location: [facility] OR     ORTHOPEDIC SURGERY      (R) knee scope     ORTHOPEDIC SURGERY      total hip      ORTHOPEDIC SURGERY      elbow surgery     Social History     Social History     Marital status:      Spouse name: N/A     Number of children: 2     Years of education: N/A     Occupational History     Retired       Social History Main Topics     Smoking status: Never Smoker     Smokeless tobacco: Never Used     Alcohol use Yes      Comment: quit 10/2015; now 3-4 Vodkas/night     Drug use: No     Sexual activity: Not Currently     Other Topics Concern     Not on file     Social History Narrative    , 2 children, retired . He does not have a living will but desires full code.  (last updated 11/29/2017)      Medications on admission:   Prescriptions Prior to Admission   Medication Sig Dispense Refill Last Dose     aspirin 81 MG tablet Take 81 mg by mouth daily   11/22/2018 at pm     carvedilol (COREG) 3.125 MG tablet Take 1 tablet (3.125 mg) by mouth 2 times daily (with meals) 180 tablet 1 11/27/2018 at 0500     fluticasone (FLONASE) 50 MCG/ACT spray Spray 1 spray into both nostrils daily   11/27/2018 at 0500     gabapentin (NEURONTIN) 600 MG tablet Take 1 tablet (600 mg) by mouth 3 times daily 270 tablet 3 11/27/2018 at 0500     Rivaroxaban (XARELTO PO) Take 20 mg by mouth daily   11/22/2018 at pm     torsemide (DEMADEX) 20 MG tablet Take 20 mg by mouth daily   10/27/2018     [DISCONTINUED] HYDROmorphone HCl (DILAUDID PO) Take 2 mg by mouth every 6 hours as needed for moderate to severe pain   11/13/2018 at prn     Allergies:    Allergies   Allergen Reactions     Ciprofloxacin  "Itching     Severe itching \"like ants were crawling\"     Hmg-Coa-R Inhibitors Other (See Comments)     Rhabdomyolysis occurred within a couple days       Hospital Course: Patient was admitted to Orthopedics and brought to the OR on where he underwent the above named procedure. Postoperatively he did very well with no apparent complications, and he had an uneventful hospital stay. Antibiotics were given for 24 hours after surgery. He was given Coumadin for DVT prophylaxis and his pain was well controlled with dilaudid, then transitioned to oral pain medications during his hospital stay. He progressed slowly with physical therapy and discharge to TCU was recommended. His chronic medical problems were followed by the medicine team during his hospital stay and there were no significant changes to conditions or treatment plans. No new medical problems presented during his hospital stay. Patient did have a syncopal episode. His dilaudid dose was decreased as a result and he ended up having a transfusion of 2 units of PRBC's. He responded well to these changes and hasn't had any issues since.   Consultations Obtained During Hospitalization:  1. Internal medicine for management of comorbid conditions and home medication management.  2. Physical Therapy for gait training, mobilization, range of motion and strengthening exercises  3. Occupational Therapy for activities of daily living.  4. Social Work for disposition planning.  Active Problems:    S/P total hip arthroplasty    Discharge Disposition: patient improving slowly status post right total hip replacement  Discharge Diet: resume normal pre operative diet  Discharge Medications:   Current Discharge Medication List      START taking these medications    Details   hydrOXYzine (ATARAX) 10 MG tablet Take 1 tablet (10 mg) by mouth every 6 hours as needed for itching  Qty: 30 tablet, Refills: 0    Associated Diagnoses: Status post total replacement of right hip    "   ondansetron (ZOFRAN-ODT) 4 MG ODT tab Take 1 tablet (4 mg) by mouth every 6 hours as needed for nausea or vomiting  Qty: 30 tablet, Refills: 0    Associated Diagnoses: Status post total replacement of right hip      senna-docusate (SENOKOT-S/PERICOLACE) 8.6-50 MG tablet Take 1 tablet by mouth 2 times daily  Qty: 30 tablet, Refills: 0    Associated Diagnoses: Status post total replacement of right hip         CONTINUE these medications which have CHANGED    Details   HYDROmorphone (DILAUDID) 2 MG tablet Take 1 tablet (2 mg) by mouth every 4 hours as needed for breakthrough pain  Qty: 30 tablet, Refills: 0    Associated Diagnoses: Status post total replacement of right hip         CONTINUE these medications which have NOT CHANGED    Details   aspirin 81 MG tablet Take 81 mg by mouth daily      carvedilol (COREG) 3.125 MG tablet Take 1 tablet (3.125 mg) by mouth 2 times daily (with meals)  Qty: 180 tablet, Refills: 1      fluticasone (FLONASE) 50 MCG/ACT spray Spray 1 spray into both nostrils daily      gabapentin (NEURONTIN) 600 MG tablet Take 1 tablet (600 mg) by mouth 3 times daily  Qty: 270 tablet, Refills: 3    Associated Diagnoses: Phantom limb pain (H)      Rivaroxaban (XARELTO PO) Take 20 mg by mouth daily      torsemide (DEMADEX) 20 MG tablet Take 20 mg by mouth daily           Code Status: full code  X-rays needed at the follow up visit:   Iftikhar Santamaria PA-C  12/3/2018 9:59 AM   [facility] [place]  [phone]

## 2018-12-03 NOTE — PLAN OF CARE
Problem: Patient Care Overview  Goal: Plan of Care/Patient Progress Review  Outcome: Improving  Pt remains A/O. Up with A2 walker GB. IVSL. Dilaudid for pain. Generalized mild edema. O2 1L. Prosthesis LLE. Moderate Edema in RLE, US negative. CIWA 0. Plan for TCU pending. Continue to monitor.

## 2018-12-03 NOTE — CONSULTS
Consult completed. Met with pt to discuss discharge plan. Pt does not feel his is strong enough to discharge today. Pt does not feel [facility] can meet his needs. Pt stated his main concerns are not being able to walk, pain, and requiring heavy assist with transferring. SW tried to explain that all of those things can be controlled over at [facility] but pt was adamant he cannot leave today. FAHEEM explained MD has cleared him to go and pt would have option to appeal discharge if he did not agree with it. Pt stated he would call and appeal discharge. Pt's wife, Helena in room and aware of pt appealing discharge. FAHEEM updated RN, RN CC, MD.

## 2018-12-03 NOTE — PROGRESS NOTES
"Antonio Ramirez  12/3/2018  Patient s/p RTHA  Admit Date:  2018      Normal healing wound.  Objective: patient with complaints of continued pain in the right hip. States he still feels weak and refusing to leave the hospital. He has called the appeal team and is working on not getting discharge.   Blood pressure 99/54, pulse 60, temperature 98.3  F (36.8  C), temperature source Oral, resp. rate 16, height 1.88 m (6' 2\"), weight 105.5 kg (232 lb 8 oz), SpO2 93 %.    Temperatures:  Current - Temp: 98.3  F (36.8  C); Max - Temp  Av.3  F (36.8  C)  Min: 97.8  F (36.6  C)  Max: 98.7  F (37.1  C)  Pulse range: Pulse  Av.5  Min: 55  Max: 60  Blood pressure range: Systolic (24hrs), Av , Min:99 , Max:107   ; Diastolic (24hrs), Av, Min:54, Max:64    Exam:  CMS: intact  alert, stable, wound ok  Dressing c/d/i  Calf non tender   DF/PF 5/5  1-2+ edema in the RLE  Bruising noted distally at the edge of the dressing but no drainage.       Labs:  Recent Labs   Lab Test  18   0652  18   0618   0724   POTASSIUM  4.5  4.7  4.9     Recent Labs   Lab Test  18   0652  18   0622  18   1555   HGB  8.1*  7.1*  7.3*     Recent Labs   Lab Test  10/30/18   1324  18   0700  18   0807   INR  1.46*  1.68*  1.39*     Recent Labs   Lab Test  18   0652  18   0622  18   0724   PLT  112*  94*  101*       PLAN: continue current therapy regimen will full anterolateral hip precautions. Continue current xarelto.   Patient had US just now to r/o DVT in the RLE. We will continue to monitor. Patient was made clear and aware that staying in the hospital just for the reason of not being strong enough was not the best option for him. He runs a huge risk of nevaeh an infection the longer that he stays in the hospital. He said he was aware of this and was going to continue to push forward with the appeal process. He has a bed available at [facility] today if he changes " his mind. From an ortho standpoint he is ready for discharge. Medical team managing other medical issues.

## 2018-12-03 NOTE — PROGRESS NOTES
"SPIRITUAL HEALTH SERVICES Progress Note  [facility]    Visited per LOS.      introduced themselves and oriented Pt with  services. Pt said: \"There is nothing you can help me with my dear.\"     provided a kind and caring presence.     has no plans to follow.     Wilfred Bello  Chaplain Resident  "

## 2018-12-03 NOTE — PLAN OF CARE
Problem: Hip Arthroplasty (Total, Partial) (Adult)  Goal: Signs and Symptoms of Listed Potential Problems Will be Absent, Minimized or Managed (Hip Arthroplasty)  Signs and symptoms of listed potential problems will be absent, minimized or managed by discharge/transition of care (reference Hip Arthroplasty (Total, Partial) (Adult) [facility]).   Outcome: Adequate for Discharge Date Met: 12/03/18  Pt up with assist of 2, minimal pain, oral dilaudid given one time for pain, dressing CDI, baseline numbness on RLE, voids per urinal.

## 2018-12-03 NOTE — PLAN OF CARE
Problem: Patient Care Overview  Goal: Plan of Care/Patient Progress Review  OT: pt was being prepared to left for procedure, Ultrasound when OT attempted for session

## 2018-12-03 NOTE — PROGRESS NOTES
[facility]    Hospitalist Progress Note    Assessment & Plan   Antonio Ramirez is a 73 year old male with a history of pulmonary HTN, paroxysmal a fib, 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  Hospitalist consulted for assistance with medical management       Right total hip arthroplasty:  -Patient underwent planned right hip arthroplasty by Dr. Howard on 11/27/18.  EBL 1200 mL.    -Routine postop care per orthopedic surgery.    Near syncope/syncope:  -Patient reportedly syncopized for several seconds on 11/29 although he downplayed that episode  -Per nursing report he needed a sternal rub to get a response  -Brief episode of syncope was likely multifactorial, in the postop setting, volume loss and blood loss anemia with valvular issues and severe pulmonary hypertension  -Continue to monitor on telemetry  -Repeat echo-not significantly changed from prior, appears like the wall motion abnormality mentioned on current echo was present on echo from 7/2018.  EF marginally decreased from 40-45% to 35-40%.  Moderate aortic stenosis  -Patient had further one episode of near syncope yesterday on 12/30; again this is likely multifactorial, appears to be volume down, multiple valvular problems and pulmonary hypertension.    -No further near syncope or syncope since.    Acute kidney injury on chronic kidney disease stage III  -He is to have a baseline chronic kidney disease although his creatinine has been fluctuating lately, was normal on October 2018.  -Creatinine worsened from 1.59 to 2.09, on 12/1 now improving, creatinine 1.29 today  -Continue diuretics.  Please see discussion below.  Recommend rechecking BMP on 12/7 at TCU if he discharges today.    Acute postop on chronic anemia:  -Baseline hemoglobin appears to be between 10.5-12  -Received 1 additional unit of PRBC yesterday(2  since surgery)  -Hemoglobin stable at 8.1      Ischemic cardiomyopathy w/reduced EF.   CAD s/p CABG in 2007  HTN/HLP  Paroxysmal a-fib   Supposed history of antiphospholipid antibody/anticardiolpin antibody  An echocardiogram on 7/11/18 showed EF of 40-45% with anterior, septal and apical wall akinesis. Severe pulmonary hypertension, moderate aortic stenosis, moderate mitral regurgitation.  Repeat echo as described above  -Blood pressures have been borderline, hold Coreg   -Torsemide restarted 12/2.  Still has significant right lower extremity edema.  Will check venous Dopplers to rule out DVT.  -recommended compression stockings if venous Doppler is negative  -Denies dyspnea, not hypoxic  -Recommend additional doses of torsemide 20 mg at 4 PM for 3 days, further dosing of torsemide to be determined based on BMP and lower extremity edema on Friday.  Can be addressed by MD at TCU  -Outpatient follow-up with [facility] heart in 2-3 weeks.  -Holding ASA, restart once okay with ortho  -Xarelto restarted 11/28  -Overnight oximetry done 2 nights ago was positive, recommend nighttime oxygen at sleep and during daytime naps.  Likely will need sleep study as outpatient.  Defer to PCP.      Aortic root dilation   Last echo in July also showed the aortic sinus of Valsalva mildly dilated at 4.3 cm and ascending aorta was borderline at 3.7 cm   -Follow up as planned       H/o Alcohol abuse   Possible alcoholic liver disease w/cirrhosis and ascites  Had been drinking 3-4 drinks/night but supposedly quit in 2015 tough an ETOH level from 10/31/18 was 0.10.  Patient carries the history of decompensated alcoholic liver cirrhosis with ascites but CT imaging of the abdomen makes no mention of cirrhosis.  He has had ultrasounds in the past that have showed ascites and has had a paracentesis as recently as 10/30/18.   -no evidence of withdrawal at this time.      Generalized deconditioning:  -Appears to be weak overall,  multifactorial  -Physical therapy recommending TCU       # Pain Assessment:  Current Pain Score 12/3/2018   Patient currently in pain? -   Pain score (0-10) 3   Pain location -   Pain descriptors -   CPOT pain score -   defer to primary service      D/W: RN  DVT Prophylaxis: xarelto  Code Status: Full Code    Disposition: Expected discharge; likely today to TCU if venous Doppler negative.    Anthony Baker MD    Interval History   Renal function stable.  Right lower extremity continues to be edematous.  Denies chest pain or dyspnea.  No further syncope or presyncope.  Continues to feel weak overall.  He told me he is not ready for discharge due to ongoing generalized weakness and pain    -Data reviewed today: I reviewed all new labs and imaging results over the last 24 hours.  EKG shows controlled A. fib.  No dysrhythmias.    Physical Exam   Temp: 98.3  F (36.8  C) Temp src: Oral BP: 99/54 Pulse: 60 Heart Rate: 57 Resp: 16 SpO2: 93 % O2 Device: None (Room air) Oxygen Delivery: 1 LPM  Vitals:    11/27/18 0737   Weight: 105.5 kg (232 lb 8 oz)     Vital Signs with Ranges  Temp:  [97.8  F (36.6  C)-98.7  F (37.1  C)] 98.3  F (36.8  C)  Pulse:  [55-60] 60  Heart Rate:  [57-62] 57  Resp:  [16] 16  BP: ()/(54-64) 99/54  SpO2:  [93 %-100 %] 93 %  I/O last 3 completed shifts:  In: 1065 [P.O.:240; I.V.:825]  Out: 1175 [Urine:1175]    Constitutional: AAOX3, sitting up in a chair.  NAD  Respiratory:   CTA B/L, Normal WOB  Cardiovascular: RRR, systolic murmur in the left parasternal area and aortic area   GI: Soft, Non- tender, BS- normoactive  Skin/Integument: Warm and dry, no rashes  MSK: No joint deformity or swelling, 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 and 1-2+ edema of right lower extremely  Neuro: CN- grossly intact, moving extremities appropriately, no tremors      Medications           fluticasone  1 spray Both Nostrils Daily     folic acid  1 mg Oral Daily     gabapentin  600 mg Oral BID      multivitamin w/minerals  1 tablet Oral Daily     rivaroxaban ANTICOAGULANT  20 mg Oral Daily with supper     senna-docusate  1 tablet Oral BID    Or     senna-docusate  2 tablet Oral BID     sodium chloride (PF)  3 mL Intracatheter Q8H     [START ON 12/4/2018] torsemide  20 mg Oral Daily     torsemide  20 mg Oral Daily at 4 pm     vitamin B1  100 mg Oral Daily       Data       Recent Labs  Lab 12/03/18  0714 12/02/18  0652 12/01/18  0622 11/30/18  1555 11/30/18  0724 11/29/18  0714   WBC  --  6.2 6.2  --  6.2  --    HGB  --  8.1* 7.1* 7.3* 7.9* 7.1*   MCV  --  92 93  --  96  --    PLT  --  112* 94*  --  101* 91*   NA  --  133 132*  --  133 133   POTASSIUM  --  4.5 4.7  --  4.9 4.8   CHLORIDE  --  102 102  --  102 101   CO2  --  26 26  --  25 28   BUN  --  36* 35*  --  29 21   CR 1.29* 1.61* 2.09*  --  2.07* 1.59*   ANIONGAP  --  5 4  --  6 4   BENNIE  --  8.5 8.5  --  8.5 8.2*   GLC  --  97 102*  --  88 91   ALBUMIN  --   --   --   --   --  3.0*   PROTTOTAL  --   --   --   --   --  6.4*   BILITOTAL  --   --   --   --   --  0.8   ALKPHOS  --   --   --   --   --  65   ALT  --   --   --   --   --  10   AST  --   --   --   --   --  25       No results found for this or any previous visit (from the past 24 hour(s)).

## 2018-12-03 NOTE — PLAN OF CARE
Problem: Patient Care Overview  Goal: Plan of Care/Patient Progress Review  Discharge Planner PT   Patient plan for discharge: Per discussion with BYRON MAYEN plan updated to TCU stay prior to returning home.   Current status: Patient in supine upon arrival of therapist and upset about plan for patient to discharge to TCU today as he doesn't feel he is ready. Patient denied pain at rest and required assist to tosha L BKA prosthesis. Patient performed supine-sit with Pepper of 2 and frequent cues to maintain hip precautions. Sit <> stand with FWW and modA of 2 from elevated bed, pt required 3 attempts to stand as first 2 attempts pt was not assisting with transfer. Pt ambulated 6 feet with FWW and Pepper of 2 for safety with close wheelchair follow. Pt sitting up in wheelchair at end of session, pt denied dizziness and BP stable.   Barriers to return to prior living situation: Level of assist, Decreased activity tolerance, Fall risk, Pain  Recommendations for discharge: TCU  Rationale for recommendations: Pt will benefit from TCU placement prior to return home with spouse as pt requires assist of two for limited functional mobility.        Entered by: Radha Cintron 12/03/2018 12:42 PM

## 2018-12-03 NOTE — PLAN OF CARE
Problem: Patient Care Overview  Goal: Plan of Care/Patient Progress Review  VSS. Om 1 L oxygen. Alert and oriented x 3, forgetful. Dressing cdi, baseline numbness on RLE. Up with assist of 2. Voids in urinal.  Torsemide started. CIWA score 1. mepilex on coccyx for blanchable erythema.

## 2018-12-04 ENCOUNTER — APPOINTMENT (OUTPATIENT)
Dept: OCCUPATIONAL THERAPY | Facility: CLINIC | Age: 73
DRG: 470 | End: 2018-12-04
Attending: ORTHOPAEDIC SURGERY
Payer: MEDICARE

## 2018-12-04 ENCOUNTER — APPOINTMENT (OUTPATIENT)
Dept: PHYSICAL THERAPY | Facility: CLINIC | Age: 73
DRG: 470 | End: 2018-12-04
Attending: ORTHOPAEDIC SURGERY
Payer: MEDICARE

## 2018-12-04 LAB
CREAT SERPL-MCNC: 1.23 MG/DL (ref 0.66–1.25)
GFR SERPL CREATININE-BSD FRML MDRD: 57 ML/MIN/1.7M2
HGB BLD-MCNC: 8 G/DL (ref 13.3–17.7)

## 2018-12-04 PROCEDURE — 86900 BLOOD TYPING SEROLOGIC ABO: CPT | Performed by: INTERNAL MEDICINE

## 2018-12-04 PROCEDURE — 97535 SELF CARE MNGMENT TRAINING: CPT | Mod: GO

## 2018-12-04 PROCEDURE — 12000007 ZZH R&B INTERMEDIATE

## 2018-12-04 PROCEDURE — A9270 NON-COVERED ITEM OR SERVICE: HCPCS | Mod: GY | Performed by: ORTHOPAEDIC SURGERY

## 2018-12-04 PROCEDURE — 82565 ASSAY OF CREATININE: CPT | Performed by: ORTHOPAEDIC SURGERY

## 2018-12-04 PROCEDURE — G0463 HOSPITAL OUTPT CLINIC VISIT: HCPCS

## 2018-12-04 PROCEDURE — 25000132 ZZH RX MED GY IP 250 OP 250 PS 637: Mod: GY | Performed by: INTERNAL MEDICINE

## 2018-12-04 PROCEDURE — 86901 BLOOD TYPING SEROLOGIC RH(D): CPT | Performed by: INTERNAL MEDICINE

## 2018-12-04 PROCEDURE — 40000193 ZZH STATISTIC PT WARD VISIT: Performed by: PHYSICAL THERAPIST

## 2018-12-04 PROCEDURE — 99232 SBSQ HOSP IP/OBS MODERATE 35: CPT | Performed by: INTERNAL MEDICINE

## 2018-12-04 PROCEDURE — 40000133 ZZH STATISTIC OT WARD VISIT

## 2018-12-04 PROCEDURE — A9270 NON-COVERED ITEM OR SERVICE: HCPCS | Mod: GY | Performed by: PHYSICIAN ASSISTANT

## 2018-12-04 PROCEDURE — A9270 NON-COVERED ITEM OR SERVICE: HCPCS | Mod: GY | Performed by: NURSE PRACTITIONER

## 2018-12-04 PROCEDURE — 36415 COLL VENOUS BLD VENIPUNCTURE: CPT | Performed by: INTERNAL MEDICINE

## 2018-12-04 PROCEDURE — 36415 COLL VENOUS BLD VENIPUNCTURE: CPT | Performed by: ORTHOPAEDIC SURGERY

## 2018-12-04 PROCEDURE — 97530 THERAPEUTIC ACTIVITIES: CPT | Mod: GP | Performed by: PHYSICAL THERAPIST

## 2018-12-04 PROCEDURE — 85018 HEMOGLOBIN: CPT | Performed by: INTERNAL MEDICINE

## 2018-12-04 PROCEDURE — 97530 THERAPEUTIC ACTIVITIES: CPT | Mod: GO

## 2018-12-04 PROCEDURE — 25000132 ZZH RX MED GY IP 250 OP 250 PS 637: Mod: GY | Performed by: NURSE PRACTITIONER

## 2018-12-04 PROCEDURE — 25000128 H RX IP 250 OP 636: Performed by: INTERNAL MEDICINE

## 2018-12-04 PROCEDURE — A9270 NON-COVERED ITEM OR SERVICE: HCPCS | Mod: GY | Performed by: INTERNAL MEDICINE

## 2018-12-04 PROCEDURE — 25000132 ZZH RX MED GY IP 250 OP 250 PS 637: Mod: GY | Performed by: PHYSICIAN ASSISTANT

## 2018-12-04 PROCEDURE — 86923 COMPATIBILITY TEST ELECTRIC: CPT | Performed by: INTERNAL MEDICINE

## 2018-12-04 PROCEDURE — 97116 GAIT TRAINING THERAPY: CPT | Mod: GP | Performed by: PHYSICAL THERAPIST

## 2018-12-04 PROCEDURE — 25000132 ZZH RX MED GY IP 250 OP 250 PS 637: Mod: GY | Performed by: ORTHOPAEDIC SURGERY

## 2018-12-04 PROCEDURE — 86850 RBC ANTIBODY SCREEN: CPT | Performed by: INTERNAL MEDICINE

## 2018-12-04 PROCEDURE — 97110 THERAPEUTIC EXERCISES: CPT | Mod: GP | Performed by: PHYSICAL THERAPIST

## 2018-12-04 RX ADMIN — GABAPENTIN 600 MG: 600 TABLET, FILM COATED ORAL at 22:06

## 2018-12-04 RX ADMIN — IRON SUCROSE 300 MG: 20 INJECTION, SOLUTION INTRAVENOUS at 18:09

## 2018-12-04 RX ADMIN — HYDROMORPHONE HYDROCHLORIDE 2 MG: 2 TABLET ORAL at 12:56

## 2018-12-04 RX ADMIN — HYDROMORPHONE HYDROCHLORIDE 2 MG: 2 TABLET ORAL at 00:55

## 2018-12-04 RX ADMIN — GABAPENTIN 600 MG: 600 TABLET, FILM COATED ORAL at 08:49

## 2018-12-04 RX ADMIN — ACETAMINOPHEN 650 MG: 325 TABLET, FILM COATED ORAL at 09:58

## 2018-12-04 RX ADMIN — SENNOSIDES AND DOCUSATE SODIUM 1 TABLET: 8.6; 5 TABLET ORAL at 08:49

## 2018-12-04 RX ADMIN — SENNOSIDES AND DOCUSATE SODIUM 2 TABLET: 8.6; 5 TABLET ORAL at 22:06

## 2018-12-04 RX ADMIN — FLUTICASONE PROPIONATE 1 SPRAY: 50 SPRAY, METERED NASAL at 08:49

## 2018-12-04 RX ADMIN — RIVAROXABAN 20 MG: 10 TABLET, FILM COATED ORAL at 16:34

## 2018-12-04 RX ADMIN — TORSEMIDE 20 MG: 20 TABLET ORAL at 08:49

## 2018-12-04 RX ADMIN — Medication 100 MG: at 08:49

## 2018-12-04 RX ADMIN — HYDROMORPHONE HYDROCHLORIDE 2 MG: 2 TABLET ORAL at 08:48

## 2018-12-04 RX ADMIN — HYDROMORPHONE HYDROCHLORIDE 2 MG: 2 TABLET ORAL at 22:06

## 2018-12-04 RX ADMIN — MULTIPLE VITAMINS W/ MINERALS TAB 1 TABLET: TAB at 08:49

## 2018-12-04 RX ADMIN — TORSEMIDE 20 MG: 20 TABLET ORAL at 16:33

## 2018-12-04 RX ADMIN — FOLIC ACID 1 MG: 1 TABLET ORAL at 08:49

## 2018-12-04 ASSESSMENT — ACTIVITIES OF DAILY LIVING (ADL)
ADLS_ACUITY_SCORE: 18
ADLS_ACUITY_SCORE: 16

## 2018-12-04 NOTE — PROGRESS NOTES
"[facility] Nurse Inpatient Wound Assessment     Follow up Assessment of wound(s) on pt's:   Distal left stump        Data:   Patient History:      per MD note(s):  Pt is s/p RIGHT TOTAL HIP ARTHROPLASTY 18 w Dr. Howard    Ottoniel Assessment and sub scores:   Ottoniel Score  Av.8  Min: 14  Max: 23     Positioning: Pillows,     Mattress:  Standard , Atmos Air mattress    Current Diet / Nutrition:       Active Diet Order      Advance Diet as Tolerated: Regular Diet Adult      Diet      Advance Diet as Tolerated  Recent Labs   Lab Test  18   0652   18   0714   10/30/18   1324   18   1641   ALBUMIN   --    --   3.0*   < >   --    < >   --    HGB  8.1*   < >  7.1*   < >   --    < >  10.6*   INR   --    --    --    --   1.46*   < >   --    WBC  6.2   < >   --    < >   --    < >  4.4   A1C   --    --    --    --    --    --   4.6    < > = values in this interval not displayed.     Wound Assessment (location):   Distal left stump  Wound History:  Per discussion with pt and wife:  Pt had LLE amputated amputated about 5 years ago in [place].  Has been to Dr. Howard for debridements of the stump.  Both state that stump as never really developed a callus over that time.  Pt has had some crusty drainage that has developed off and and on over the years.  Wife says more recently a a fissure has developed with a bit of drainage and would like someone to look at it.  Pt has stopped wearing a stocking between skin and prosthetic device.  The device has a foul odor, no apparent drainage noted on gray, rubbery prosthetic   Pt states he has no sensation below the knee.   Pt has a 2\" diameter, circular, purple ring of nonblanchable  Erythema with lighter coloration this week,  near the lateral left knee, no itching or pain.  He was not aware of its presence   There is nothing obvious on the prosthetic that would make such an impression on the skin  Distal left stump- near the full base " "of the stump is dry, orange-isis scabs, adherent that looks like the start of callused tissue underlying.  There is a small, dry fissure that now appears to have epithelialized, no drainage noted.  Pt still wearing the gray prosthetic right next to the skin            Intervention:     Patient's chart evaluated.      Wound(s) assessed with wife    Orders  reviewed    Supplies  reviewed, discussed with RN, discussed with family and discussed with patient      Discussed plan of care with Patient, Family and Nurse,     All patient / family questions answered:  YES- discussed with wife to follow up on care of the distal stump and the prosthetic sleeve with the ortho group.  She will stop by the ortho clinic to ask questions      How often to replace prosthetic sleeve, how to clean it, it is odorous so now what, how to form callus, etc.          Assessment:          It looks like there may be a bit of a callus starting to form on the distal stump.  The fissure that sits amidst the crusty/ callus tissue now appears to have epithelialized in its base so no drainage.  At this time will recommend continuing with wound/ skin care that I laid out last week.  Will continue with the tiny spot of vaseline just on the tip of the stump to encourage that fissure to continue to heal.  Fissures can be very tough to heal so while pt in the hospital he is on his feet less so now is a good time to get site healed.  No pain with assessment.  No erythema.      Prosthetic is still smelly and odorous.  Again discussed with pt to wear a fabric sleeve next to the skin and to get the gray prosthetics either replaced or properly cleaned.       My conversation w wife and pt on 11/27/18:   Had a lengthy discussion with pt and wife about necessity of wearing a sock with the prosthetic, the rubbery \"sleeve\" that is worn next to skin is smelly and holding old drainage, sweat, wound and skin debris= not good for skin and wounds.  Pt needs to talk with " "orthotics about what they recommend for care of prosthetics devices, if they fit properly and if they want him wearing a washable sleeve next to skin, etc.     Pt intelligent but seems a bit difficult to talk with in that seems trying to justify not wearing a washable sock next to the skin, not doing BID skin/ wound cares and \"how can he wash his leg in the morning\"- told him I don't know how he will do it but the prosthetic smells and he needs to clean his skin more than the once a week he has been doing it.      I am not sure what the circular purple ring is on the left lateral knee, site does note seem to correlate with pressure caused by the prosthetic, does not seem to the normal coloration of ring worm(is purple vs crusty orange-red) but pt does have a very smelly rubbery device he wears on his leg and then this may just be a healing old bruise that is healing in a weird way  I had a brief conversation with Dr. Jorgensen asking him to please stop by and look at the site.  This site can just be left open to the air and cleaned BID        Plan:     Nursing to notify the Provider(s) and re-consult the [facility] Nurse if wound(s) deteriorate(s) or if the wound care plan needs reevaluation.    Plan of care for left stump: 2x/ day    1. Clean with gentle soap and water, dry and dry again.  Make sure is dry before putting on prosthetic.    2. Apply lotion down to the very distal aspect of the stump but not on the very bottom/ distal aspect- want that to become dry and callused    3. Apply very small dab of Vaseline just where the fissure is.  Goal is to keep that just a bit moist so it can heal but not moisten the surround callus    4. Cover distal stump with one, dry 4x4 then a 4x4 that is opened and conformed to the stump\    5. Wear a washable sleeve    6. Keep rubbery prosthetic clean  NOTE- fissures are very hard to heal.  Wound that has been on tip of stump is a fissure.  While in the hospital pt has had prosthesis on " "less so the skin and wound care combined with less pressure may have gotten that fissure to heal down.  If appears healed then could stop the Vaseline but continue with a good lotion to the end of the stump so the callused tissue does not get so dry it splits again.       PERFORM \"GOOD FOOT CARE to the right foot daily:  1. CLEAN right foot daily using gentle  soap and water, making sure to clean between the toes. Dry thoroughly, especially between the toes. Be careful when drying between toes to not use a sawing-action, this may cut the skin between the toes.   2. Spray the skin from toes to knee, not between the toes with William Cleanse and Protect, massage into skin and allow sit on skin for 5-10 minutes then wipe or rinse off (the William will \"condition\" the skin by loosening crusty debris, moisturizing and cleaning the skin)  3. LOTION from toes to knee, not between toes (lotion moisturizes and too much moisture between toes may cause a fungal rash).    4.  If noticing moisture, itching or odor between the toes dust with antifungal powder, think Desenex, twice a day x 4-5 days.    5. Clean SOCKS every day.  6.  SHOES:  Wear hard soled shoes at all times to minimize bumping feet and/or stepping on something.  This includes wearing hard soled slippers around the house.  No flip flops or open toed shoes in the summer, etc.  You need to protect your toes and feet. And if shoes do not fit then DO NOT WEAR THEM  7.CHECK FEET at least two to three times every day by feeling with hands/ fingers and fully visualizing (use a hand held mirror).  You are looking for new wounds, irritation, etc.  Recommend checking in morning before even getting out of bed and check again before bed.   Also would be important to check feet after removing shoes, assuring no wrinkles in socks and shoes fitting properly.  Air feet throughout the day, as able (drying out feet and relieving pressure)  8. BLOOD SUGARS:  Keep blood sugars in a good " balance.   9.. Keep nails trimmed and filed to minimize snagging, this could create a wound.  Follow up with podiatry to trim nails as needed.   11. Are you using COMPRESSION WRAPS?  Assure you follow up for regular fittings.  Note- all wraps and stockings wear out.  Some after only a few weeks.      Will order orthotics and dietician to see pt    WOC Nurse will return: no need to continue to follow

## 2018-12-04 NOTE — PROGRESS NOTES
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  Hospitalist consulted for assistance with medical management       Right total hip arthroplasty POD # 7:  -prolonged post op course due to complications below  -Patient underwent planned right hip arthroplasty by Dr. Howard on 11/27/18.  EBL 1200 mL.    -Routine postop care per orthopedic surgery.     Near syncope/syncope:  -Patient reportedly syncopized for several seconds on 11/29 although he downplayed that episode  -Per nursing report he needed a sternal rub to get a response  -Brief episode of syncope was likely multifactorial, in the postop setting, volume loss and blood loss anemia with valvular issues and severe pulmonary hypertension  -Continue to monitor on telemetry  -Repeat echo-not significantly changed from prior, appears like the wall motion abnormality mentioned on current echo was present on echo from 7/2018.  EF marginally decreased from 40-45% to 35-40%.  Moderate aortic stenosis  -Patient had further one episode of near syncope yesterday on 12/30; again this is likely multifactorial, appears to be volume down, multiple valvular problems and pulmonary hypertension.    -No further near syncope or syncope since.     Acute kidney injury on chronic kidney disease stage III  -He is to have a baseline chronic kidney disease although his creatinine has been fluctuating lately, was normal on October 2018.  -Creatinine worsened from 1.59 to 2.09, on 12/1 now improving, creatinine 1.29 to 1.23 today  -Continue diuretics.  Please see discussion below.   - Recommend rechecking BMP on 12/7 at TCU if he discharges today.     Acute postop on chronic  anemia:  -Baseline hemoglobin appears to be between 10.5-12,   -Received 1 additional unit of PRBC yesterday(2 since surgery)  -Hemoglobin stable at 8.1 on 12/2, will repeat if she is here  -repeat stat hgb  - will give venofer today and tomorrow.      Ischemic cardiomyopathy w/reduced EF.   CAD s/p CABG in 2007  HTN/HLP  Paroxysmal a-fib   Supposed history of antiphospholipid antibody/anticardiolpin antibody  An echocardiogram on 7/11/18 showed EF of 40-45% with anterior, septal and apical wall akinesis. Severe pulmonary hypertension, moderate aortic stenosis, moderate mitral regurgitation.  Repeat echo as described above  -Blood pressures have been borderline, hold Coreg   -Torsemide restarted 12/2.  Still has significant right lower extremity edema.  Will check venous Dopplers to rule out DVT.  -recommended compression stockings if venous Doppler is negative  -Denies dyspnea, not hypoxic  -Recommend additional doses of torsemide 20 mg at 4 PM for 3 days, further dosing of torsemide to be determined based on BMP and lower extremity edema on Friday.  Can be addressed by MD at TCU  -Outpatient follow-up with [facility] heart in 2-3 weeks.  -Holding ASA, restart once okay with ortho  -Xarelto restarted 11/28  -Overnight oximetry done 2 nights ago was positive, recommend nighttime oxygen at sleep and during daytime naps.  Likely will need sleep study as outpatient.  Defer to PCP.      Aortic root dilation   Last echo in July also showed the aortic sinus of Valsalva mildly dilated at 4.3 cm and ascending aorta was borderline at 3.7 cm   -Follow up as planned       H/o Alcohol abuse   Possible alcoholic liver disease w/cirrhosis and ascites  Had been drinking 3-4 drinks/night but supposedly quit in 2015 tough an ETOH level from 10/31/18 was 0.10.  Patient carries the history of decompensated alcoholic liver cirrhosis with ascites but CT imaging of the abdomen makes no mention of cirrhosis.  He has had ultrasounds in the past  "that have showed ascites and has had a paracentesis as recently as 10/30/18.   -no evidence of withdrawal at this time.       Generalized deconditioning:  -Appears to be weak overall, multifactorial  -Physical therapy recommending TCU     D/W: RN    DVT Prophylaxis: xarelto    Code Status: Full Code     Disposition:   To TCU tomorrow    Interval History   -- chart reviewed      -Data reviewed today: I reviewed all new labs and imaging over the last 24 hours. I personally reviewed no images or EKG's today.    Physical Exam   Heart Rate: 54, Blood pressure 103/57, pulse 63, temperature 97.9  F (36.6  C), temperature source Oral, resp. rate 16, height 1.88 m (6' 2\"), weight 105.5 kg (232 lb 8 oz), SpO2 97 %.  Vitals:    11/27/18 0737   Weight: 105.5 kg (232 lb 8 oz)     Vital Signs with Ranges  Temp:  [97.7  F (36.5  C)-99.2  F (37.3  C)] 97.9  F (36.6  C)  Pulse:  [63-71] 63  Heart Rate:  [54-80] 54  Resp:  [16] 16  BP: ()/(51-60) 103/57  SpO2:  [90 %-97 %] 97 %  I/O's Last 24 hours  I/O last 3 completed shifts:  In: 500 [P.O.:500]  Out: 1375 [Urine:1375]    Constitutional: Awake, alert, cooperative, no apparent distress  Respiratory: Clear to auscultation bilaterally, no crackles or wheezing  Cardiovascular: Regular rate and rhythm, normal S1 and S2, and no murmur noted  GI: Normal bowel sounds, soft, non-distended, non-tender  Skin/Integumen: No rashes, no cyanosis, no edema  Other:      Medications   All medications were reviewed.      fluticasone  1 spray Both Nostrils Daily     folic acid  1 mg Oral Daily     gabapentin  600 mg Oral BID     multivitamin w/minerals  1 tablet Oral Daily     rivaroxaban ANTICOAGULANT  20 mg Oral Daily with supper     senna-docusate  1 tablet Oral BID    Or     senna-docusate  2 tablet Oral BID     sodium chloride (PF)  3 mL Intracatheter Q8H     torsemide  20 mg Oral Daily     torsemide  20 mg Oral Daily at 4 pm     vitamin B1  100 mg Oral Daily        Data     Recent " Labs  Lab 12/04/18  0744 12/03/18  0714 12/02/18  0652 12/01/18  0622 11/30/18  1555 11/30/18  0724 11/29/18  0714   WBC  --   --  6.2 6.2  --  6.2  --    HGB  --   --  8.1* 7.1* 7.3* 7.9* 7.1*   MCV  --   --  92 93  --  96  --    PLT  --   --  112* 94*  --  101* 91*   NA  --   --  133 132*  --  133 133   POTASSIUM  --   --  4.5 4.7  --  4.9 4.8   CHLORIDE  --   --  102 102  --  102 101   CO2  --   --  26 26  --  25 28   BUN  --   --  36* 35*  --  29 21   CR 1.23 1.29* 1.61* 2.09*  --  2.07* 1.59*   ANIONGAP  --   --  5 4  --  6 4   BENNIE  --   --  8.5 8.5  --  8.5 8.2*   GLC  --   --  97 102*  --  88 91   ALBUMIN  --   --   --   --   --   --  3.0*   PROTTOTAL  --   --   --   --   --   --  6.4*   BILITOTAL  --   --   --   --   --   --  0.8   ALKPHOS  --   --   --   --   --   --  65   ALT  --   --   --   --   --   --  10   AST  --   --   --   --   --   --  25       No results found for this or any previous visit (from the past 24 hour(s)).    Solomon Tucker MD  Text Page  (7am to 6pm)

## 2018-12-04 NOTE — PROGRESS NOTES
"Antonio Ramirez  2018  Patient status post RTHA  Admit Date:  2018      Normal healing wound.  Objective: pain controlled with oral dilaudid. States he is ready to discharge when we are ready for him to discharge.   Blood pressure 100/57, pulse 65, temperature 99.1  F (37.3  C), temperature source Oral, resp. rate 16, height 1.88 m (6' 2\"), weight 105.5 kg (232 lb 8 oz), SpO2 92 %.    Temperatures:  Current - Temp: 99.1  F (37.3  C); Max - Temp  Av.6  F (37  C)  Min: 97.7  F (36.5  C)  Max: 99.2  F (37.3  C)  Pulse range: Pulse  Av.3  Min: 63  Max: 71  Blood pressure range: Systolic (24hrs), Av , Min:96 , Max:111   ; Diastolic (24hrs), Av, Min:51, Max:60    Exam:  CMS: intact  alert, stable, wound ok  Dressing c/d/i: outer dressing removed and can be replaced. Do not touch the prineo dressing at all.   Patient with ecchymosis about the wound but wound intact and not draining.   DF/PF 5/  Communicates clearly with examiner    Labs:  Recent Labs   Lab Test  18   0652  18   0622  18   0724   POTASSIUM  4.5  4.7  4.9     Recent Labs   Lab Test  18   0652  18   0622  18   1555   HGB  8.1*  7.1*  7.3*     Recent Labs   Lab Test  10/30/18   1324  18   0700  18   0807   INR  1.46*  1.68*  1.39*     Recent Labs   Lab Test  18   0652  18   0622  18   0724   PLT  112*  94*  101*       PLAN: continue current therapy regimen. Discharge to rehab facility today if bed available. Xarelto for DVT ppx and on this chronically for afib as well. Follow up with us at scheduled visit.     "

## 2018-12-04 NOTE — PROGRESS NOTES
FAHEEM  D: Met with pt with RN CC to discuss discharge appeal which was lost. Pt was upset he lost. FAHEEM discussed that [facility] also does not have a bed available for pt now. FAHEEM asked pt for additional TCU choices. Pt stated he didn't have any. FAHEEM listed off some facilities and pt stated [facility] was ok. FAHEEM explained she'd send the referral out but would also put out referrals to other facilities due to pt needing to be out by 12. Pt was fine with that. FAHEEM sent out referrals via DOD.   P: Will continue to follow and support a safe discharge plan    Jasmyne Noel MSW, LGSW

## 2018-12-04 NOTE — PLAN OF CARE
Alert and oriented x4, forgetful. Up with two and walker. CMS intact. Dressing CDI. Pain winifred helped with dilaudid. Incontinent of urine. Plan to discharge to TCU, waiting on appeal process.     6176-1107: MD ordered IV iron and stat Hgb which was 8.0. Ordered 1 unit of packed red blood cells. Iron currently running waiting on type and screen for the unit to be ready.

## 2018-12-04 NOTE — PLAN OF CARE
Problem: Patient Care Overview  Goal: Plan of Care/Patient Progress Review  Outcome: Improving  Pt is A+OX4, forgetful at times. VSS on RA. Pain controlled on 2mg PO Dilauded q4h. CMS intact. R hip dressing CDI. Up with assist of 2. 2+ generalized edema. Incontinent of urine at times, uses urinal. Possible discharge today pending placement.

## 2018-12-04 NOTE — PLAN OF CARE
Problem: Patient Care Overview  Goal: Plan of Care/Patient Progress Review  Discharge Planner PT   Patient plan for discharge: TCU - possibly today  Current status: PT - pt up in chair and agreeable to PT. Worked on sit to stand transfer, difficult from low w/c as pt is tall, heavy mod assist 2. Once standing, pt requires mod assist to achieve standing balance, then able to ambulate 12' with walker and min assist 1 and assist of another to follow with w/c. Performed seated exercises. Making progress but no goals yet met, extended PT goals to 12/8/18. Noted pt may go to TCU today if bed found.  Barriers to return to prior living situation: Pt requires assist 2 for mobility  Recommendations for discharge: BPCI plan is now for TCU  Rationale for recommendations: Pt will benefit from cont skilled PT at TCU to address weakness and mobility deficits to allow eventual discharge to least restrictive living situation.       Entered by: Latasha Sarabia 12/04/2018 11:50 AM

## 2018-12-04 NOTE — PLAN OF CARE
Problem: Patient Care Overview  Goal: Plan of Care/Patient Progress Review  Discharge Planner OT   Patient plan for discharge: Pt. Plans to discharge to TCU, possibly today if bed available  Current status: Pt. Is currently requiring A of 2 to complete bed mobility, functional transfers, and functional mobility. Pt. Requires total A to don LLE prosthesis and completes simple grooming/hygiene tasks seated w/ set up. Pt. Has 2/10 pain at rest and 6-8/10 pain in RLE w/ activity.  Barriers to return to prior living situation: Pt. Currently requiring assist of 2 for functional mobility, bed mobility, functional transfers, requiring continued therapeutic intervention to safely discharge home.   Recommendations for discharge: Rec TCU d/t above stated barriers  Rationale for recommendations: Rec TCU d/t pt. Requiring further skilled therapeutic intervention to improve indep and safety w/ bed mobility, functional transfers, functional mobility, and ADL's.       Entered by: Eneida Duggan 12/04/2018 8:59 AM

## 2018-12-05 ENCOUNTER — APPOINTMENT (OUTPATIENT)
Dept: PHYSICAL THERAPY | Facility: CLINIC | Age: 73
DRG: 470 | End: 2018-12-05
Attending: ORTHOPAEDIC SURGERY
Payer: MEDICARE

## 2018-12-05 ENCOUNTER — APPOINTMENT (OUTPATIENT)
Dept: OCCUPATIONAL THERAPY | Facility: CLINIC | Age: 73
DRG: 470 | End: 2018-12-05
Attending: ORTHOPAEDIC SURGERY
Payer: MEDICARE

## 2018-12-05 VITALS
RESPIRATION RATE: 16 BRPM | DIASTOLIC BLOOD PRESSURE: 57 MMHG | TEMPERATURE: 98 F | OXYGEN SATURATION: 94 % | SYSTOLIC BLOOD PRESSURE: 102 MMHG | HEART RATE: 53 BPM | HEIGHT: 74 IN | WEIGHT: 232.5 LBS | BODY MASS INDEX: 29.84 KG/M2

## 2018-12-05 LAB
ABO + RH BLD: NORMAL
ABO + RH BLD: NORMAL
BLD GP AB SCN SERPL QL: NORMAL
BLD PROD TYP BPU: NORMAL
BLD PROD TYP BPU: NORMAL
BLD UNIT ID BPU: 0
BLOOD BANK CMNT PATIENT-IMP: NORMAL
BLOOD PRODUCT CODE: NORMAL
BPU ID: NORMAL
HGB BLD-MCNC: 8.8 G/DL (ref 13.3–17.7)
INTERPRETATION ECG - MUSE: NORMAL
NUM BPU REQUESTED: 1
PLATELET # BLD AUTO: 125 10E9/L (ref 150–450)
SPECIMEN EXP DATE BLD: NORMAL
TRANSFUSION STATUS PATIENT QL: NORMAL
TRANSFUSION STATUS PATIENT QL: NORMAL

## 2018-12-05 PROCEDURE — 25000128 H RX IP 250 OP 636: Performed by: INTERNAL MEDICINE

## 2018-12-05 PROCEDURE — 40000133 ZZH STATISTIC OT WARD VISIT

## 2018-12-05 PROCEDURE — A9270 NON-COVERED ITEM OR SERVICE: HCPCS | Mod: GY | Performed by: ORTHOPAEDIC SURGERY

## 2018-12-05 PROCEDURE — 97530 THERAPEUTIC ACTIVITIES: CPT | Mod: GO

## 2018-12-05 PROCEDURE — 97535 SELF CARE MNGMENT TRAINING: CPT | Mod: GO

## 2018-12-05 PROCEDURE — A9270 NON-COVERED ITEM OR SERVICE: HCPCS | Mod: GY | Performed by: PHYSICIAN ASSISTANT

## 2018-12-05 PROCEDURE — 85049 AUTOMATED PLATELET COUNT: CPT | Performed by: ORTHOPAEDIC SURGERY

## 2018-12-05 PROCEDURE — 36415 COLL VENOUS BLD VENIPUNCTURE: CPT | Performed by: ORTHOPAEDIC SURGERY

## 2018-12-05 PROCEDURE — 85018 HEMOGLOBIN: CPT | Performed by: ORTHOPAEDIC SURGERY

## 2018-12-05 PROCEDURE — 40000193 ZZH STATISTIC PT WARD VISIT: Performed by: PHYSICAL THERAPIST

## 2018-12-05 PROCEDURE — 25000132 ZZH RX MED GY IP 250 OP 250 PS 637: Mod: GY | Performed by: NURSE PRACTITIONER

## 2018-12-05 PROCEDURE — 97116 GAIT TRAINING THERAPY: CPT | Mod: GP | Performed by: PHYSICAL THERAPIST

## 2018-12-05 PROCEDURE — 25000132 ZZH RX MED GY IP 250 OP 250 PS 637: Mod: GY | Performed by: PHYSICIAN ASSISTANT

## 2018-12-05 PROCEDURE — A9270 NON-COVERED ITEM OR SERVICE: HCPCS | Mod: GY | Performed by: NURSE PRACTITIONER

## 2018-12-05 PROCEDURE — 25000132 ZZH RX MED GY IP 250 OP 250 PS 637: Mod: GY | Performed by: INTERNAL MEDICINE

## 2018-12-05 PROCEDURE — 97110 THERAPEUTIC EXERCISES: CPT | Mod: GP | Performed by: PHYSICAL THERAPIST

## 2018-12-05 PROCEDURE — 97530 THERAPEUTIC ACTIVITIES: CPT | Mod: GP | Performed by: PHYSICAL THERAPIST

## 2018-12-05 PROCEDURE — A9270 NON-COVERED ITEM OR SERVICE: HCPCS | Mod: GY | Performed by: INTERNAL MEDICINE

## 2018-12-05 PROCEDURE — 25000132 ZZH RX MED GY IP 250 OP 250 PS 637: Mod: GY | Performed by: ORTHOPAEDIC SURGERY

## 2018-12-05 PROCEDURE — P9016 RBC LEUKOCYTES REDUCED: HCPCS | Performed by: INTERNAL MEDICINE

## 2018-12-05 RX ADMIN — Medication 100 MG: at 09:34

## 2018-12-05 RX ADMIN — ACETAMINOPHEN 650 MG: 325 TABLET, FILM COATED ORAL at 09:34

## 2018-12-05 RX ADMIN — HYDROMORPHONE HYDROCHLORIDE 2 MG: 2 TABLET ORAL at 09:34

## 2018-12-05 RX ADMIN — FLUTICASONE PROPIONATE 1 SPRAY: 50 SPRAY, METERED NASAL at 09:38

## 2018-12-05 RX ADMIN — IRON SUCROSE 300 MG: 20 INJECTION, SOLUTION INTRAVENOUS at 09:39

## 2018-12-05 RX ADMIN — GABAPENTIN 600 MG: 600 TABLET, FILM COATED ORAL at 09:35

## 2018-12-05 RX ADMIN — FOLIC ACID 1 MG: 1 TABLET ORAL at 09:35

## 2018-12-05 RX ADMIN — SENNOSIDES AND DOCUSATE SODIUM 2 TABLET: 8.6; 5 TABLET ORAL at 09:35

## 2018-12-05 RX ADMIN — TORSEMIDE 20 MG: 20 TABLET ORAL at 09:36

## 2018-12-05 RX ADMIN — MULTIPLE VITAMINS W/ MINERALS TAB 1 TABLET: TAB at 09:36

## 2018-12-05 ASSESSMENT — ACTIVITIES OF DAILY LIVING (ADL)
ADLS_ACUITY_SCORE: 16

## 2018-12-05 NOTE — PLAN OF CARE
Problem: Patient Care Overview  Goal: Plan of Care/Patient Progress Review  Pt A&Ox4, VSS on RA. Appears irritable. C/o generalized pain, dialudid and tylenol given. Reports mildly effective. Declined further nursing interventions. Reports baseline numbness and tingling in RLE. +2 pedal pulses. +2 generalized, scrotal and RLE edema. Diuresing and with urgency. R hip dressing changed and L BKA wound cared for by resource RN, c/d/i. Fair appetite. LS clear. Iron infusion given, tolerated well. In chair this AM, up ao2 w/ walker. Prosthetic worn on LLE.  Discharge orders placed, discharge instructions reviewed with pt and pt's wife. Both verbalized understanding. Pt discharged to [place] in wheelchair via [facility] transport. Belongings packed up by student nurse Street.

## 2018-12-05 NOTE — PLAN OF CARE
Problem: Patient Care Overview  Goal: Plan of Care/Patient Progress Review  Outcome: No Change  [id number]: pt alert and oriented. Dressing CDI. CIWA score 0. Taking oral dilaudid for pain 7/10. Voiding in the urinal. Groin area with redness. Waiting blood transfusion. Will continue to monitor

## 2018-12-05 NOTE — PROVIDER NOTIFICATION
"Dr. Tucker paged re: \"fyi- pt accepted at [facility], plan to dc at 1300h. orders by ortho entered.\"  "

## 2018-12-05 NOTE — PLAN OF CARE
Problem: Patient Care Overview  Goal: Plan of Care/Patient Progress Review  Discharge Planner PT   Patient plan for discharge: TCU today?  Current status: Patient still needing max A x 2 to stand from wheelchair height chair secondary tall stature, L BKA and new R hip; difficulty maintaining hip precautions on R at rest; decreased awareness of precautions with mobility despite cues; able to ambulate with min A x 2 and walker in room; fatigued; noted to have low Hgb and patient getting iron; /54. Able to participate in a few seated ex's; mod A to return to sitting at EOB and to return to supine safely.  Barriers to return to prior living situation: current level of assist; falls risk; decreased activity tolerance  Recommendations for discharge: TCU  Rationale for recommendations: Patient would benefit from ongoing PT to address weakness, decreased activity tolerance, and impaired balance and independence with functional mobility.       Entered by: Araceli Hewitt 12/05/2018 11:06 AM

## 2018-12-05 NOTE — PLAN OF CARE
Problem: Patient Care Overview  Goal: Plan of Care/Patient Progress Review  Discharge Planner OT   Patient plan for discharge: TCU, possibly today or tomorrow, depending on TCU bed openings  Current status: Pt completed sit>stand tx from w/c w/ 2 attempts, VC's, and mod A. Once pt obtained balance, able to stand x ~ 3 minutes w/ BUE support and no LOB. Pt trained in use of reacher to improve indep w/ LB dressing, able to thread and unthread BLE's through pants w/ increased time/effort  Barriers to return to prior living situation: Pt requiring assist w/ bed mobility, functional transfers/mobility, and ADL's  Recommendations for discharge: TCU  Rationale for recommendations: Pt. Is currently unsafe to return home based on functional deficits noted. Pt. Requires continued skilled therapeutic intervention in order to facilitate safe return to PLOF.        Entered by: Eneida Duggan 12/05/2018 11:07 AM

## 2018-12-05 NOTE — PROGRESS NOTES
FAHEEM  D: Received discharge orders for pt. Pt has been accepted at [facility] for admission. Pt and spouse, Helena are in agreement for pt to discharge. Transport aide will bring pt over to [facility] at 1300. Faxed orders.    PAS-RR    D: Per DHS regulation, SW completed and submitted PAS-RR to [place] [employer] Direct Connect via the Senior LinkAge Line.  PAS-RR confirmation # is : [id number]      I: SW spoke with pt and they are aware a PAS-RR has been submitted.  FAHEEM reviewed with pt that they may be contacted for a follow up appointment within 10 days of hospital discharge if their SNF stay is < 30 days.  Contact information for [facility] was also provided.    A: Pt verbalized understanding.    P: Further questions may be directed to [facility] LinkAge Line at #[phone], option #4 for PAS-RR staff.    Jasmyne Noel MSW, LGSW

## 2018-12-05 NOTE — PLAN OF CARE
Problem: Patient Care Overview  Goal: Plan of Care/Patient Progress Review  Physical Therapy Discharge Summary    Reason for therapy discharge:    Discharged to transitional care facility.    Progress towards therapy goal(s). See goals on Care Plan in [facility] electronic health record for goal details.  Goals not met.  Barriers to achieving goals:   limited tolerance for therapy and discharge from facility.    Therapy recommendation(s):    Continued therapy is recommended.  Rationale/Recommendations:  TCU to maximize return to PLOF.

## 2018-12-05 NOTE — PLAN OF CARE
Problem: Patient Care Overview  Goal: Plan of Care/Patient Progress Review  Outcome: No Change  Pt is A+OX4, forgetful at times. VSS on RA. Blood transfusion, hemo 8.0, tolerated well. Pain controlled with  PO Dilauded q4h. CMS intact. R hip dressing CDI. Up with assist of 2. 2+ generalized edema. Incontinent of urine at times, uses urinal. CIWA score 0.  Groin area with redness. Will continue to monitor

## 2018-12-05 NOTE — PROVIDER NOTIFICATION
Prasanth Santamaria called re: Pt accepted at [facility] at 1300.  Verbal orders received: No new orders to be placed. Discharge paperwork already finished.

## 2018-12-06 ENCOUNTER — NURSING HOME VISIT (OUTPATIENT)
Dept: GERIATRICS | Facility: CLINIC | Age: 73
End: 2018-12-06
Payer: MEDICARE

## 2018-12-06 ENCOUNTER — PATIENT OUTREACH (OUTPATIENT)
Dept: CARE COORDINATION | Facility: CLINIC | Age: 73
End: 2018-12-06

## 2018-12-06 VITALS
RESPIRATION RATE: 18 BRPM | WEIGHT: 243.8 LBS | BODY MASS INDEX: 33.02 KG/M2 | OXYGEN SATURATION: 95 % | SYSTOLIC BLOOD PRESSURE: 112 MMHG | HEIGHT: 72 IN | DIASTOLIC BLOOD PRESSURE: 61 MMHG | TEMPERATURE: 99.4 F | HEART RATE: 62 BPM

## 2018-12-06 VITALS
TEMPERATURE: 99.4 F | RESPIRATION RATE: 18 BRPM | OXYGEN SATURATION: 95 % | HEIGHT: 72 IN | BODY MASS INDEX: 33.02 KG/M2 | DIASTOLIC BLOOD PRESSURE: 61 MMHG | HEART RATE: 62 BPM | SYSTOLIC BLOOD PRESSURE: 112 MMHG | WEIGHT: 243.8 LBS

## 2018-12-06 DIAGNOSIS — Z96.641 STATUS POST TOTAL REPLACEMENT OF RIGHT HIP: Primary | ICD-10-CM

## 2018-12-06 DIAGNOSIS — R53.81 PHYSICAL DECONDITIONING: ICD-10-CM

## 2018-12-06 DIAGNOSIS — Z89.512 HISTORY OF LEFT BELOW KNEE AMPUTATION (H): ICD-10-CM

## 2018-12-06 DIAGNOSIS — I77.810 AORTIC ROOT DILATATION (H): ICD-10-CM

## 2018-12-06 DIAGNOSIS — D68.61 ANTIPHOSPHOLIPID ANTIBODY SYNDROME (H): ICD-10-CM

## 2018-12-06 DIAGNOSIS — K70.31 ALCOHOLIC CIRRHOSIS OF LIVER WITH ASCITES (H): ICD-10-CM

## 2018-12-06 DIAGNOSIS — I48.0 PAROXYSMAL ATRIAL FIBRILLATION (H): ICD-10-CM

## 2018-12-06 DIAGNOSIS — M16.11 OSTEOARTHRITIS OF RIGHT HIP, UNSPECIFIED OSTEOARTHRITIS TYPE: ICD-10-CM

## 2018-12-06 DIAGNOSIS — C83.00 MALIGNANT LYMPHOPLASMACYTIC LYMPHOMA (H): ICD-10-CM

## 2018-12-06 DIAGNOSIS — K59.00 CONSTIPATION, UNSPECIFIED CONSTIPATION TYPE: ICD-10-CM

## 2018-12-06 DIAGNOSIS — I10 BENIGN ESSENTIAL HYPERTENSION: ICD-10-CM

## 2018-12-06 DIAGNOSIS — I25.5 ISCHEMIC CARDIOMYOPATHY: ICD-10-CM

## 2018-12-06 DIAGNOSIS — N17.9 ACUTE KIDNEY INJURY (H): ICD-10-CM

## 2018-12-06 DIAGNOSIS — F10.10 ALCOHOL ABUSE: ICD-10-CM

## 2018-12-06 DIAGNOSIS — N18.30 CKD (CHRONIC KIDNEY DISEASE) STAGE 3, GFR 30-59 ML/MIN (H): ICD-10-CM

## 2018-12-06 PROCEDURE — 99310 SBSQ NF CARE HIGH MDM 45: CPT | Performed by: NURSE PRACTITIONER

## 2018-12-06 RX ORDER — NYSTATIN 100000 U/G
CREAM TOPICAL 2 TIMES DAILY
COMMUNITY
End: 2019-02-28

## 2018-12-06 RX ORDER — POLYETHYLENE GLYCOL 3350 17 G/17G
17 POWDER, FOR SOLUTION ORAL DAILY
COMMUNITY
End: 2018-12-31

## 2018-12-06 RX ORDER — LIDOCAINE 50 MG/G
2 PATCH TOPICAL EVERY 24 HOURS
COMMUNITY
End: 2018-12-31

## 2018-12-06 RX ORDER — TORSEMIDE 20 MG/1
20 TABLET ORAL DAILY
Status: ON HOLD | COMMUNITY
End: 2019-03-05

## 2018-12-06 NOTE — LETTER
To:   Krista Jackson          Please give to facility    From:   Diane Chaidez RN    Care Coordinator   [facility]   Phone:  [phone]   Email: [email or web address]      Patient Name:  Antonio Ramirez YOB: 1945   Admit date: 12/05/2018      *Information Needed:  Please contact me when the patient will discharge (or if they will move to long term care)- include the discharge date, disposition, and main diagnosis   - If the patient is discharged with home care services, please provide the name of the agency    Also- Please inform me if a care conference is being held.   Phone, Fax or Email with information  Thank you!

## 2018-12-06 NOTE — LETTER
12/6/2018        RE: Antonio Ramirez  [address]  [place] [address]        [facility]  PRIMARY CARE PROVIDER AND CLINIC:  [facility] [address] / [place] [address]  Chief Complaint   Patient presents with     Hospital F/U     [facility] Medical Record Number:  [id number]  Place of Service where encounter took place:  [facility] [[id number]]    HPI:    Antonio Ramirez is a 73 year old  (1945),admitted to the above facility from  [facility].  Hospital stay 11/27/2018 through 12/5/2018.  Admitted to this facility for  rehab, medical management and nursing care.  HPI information obtained from: patient report, [facility] chart review and family/first contact [facility] report.  Current issues are:        Status post total replacement of right hip  Osteoarthritis of right hip, unspecified osteoarthritis type  Physical deconditioning  Patient underwent right total hip arthroplasty at [facility] (primary surgeon Dr. Howard) on 11/27/18 d/t hx of osteoarthritis of the right hip. EBL 1200 mL - required 3 units PRBCs inpatient (11/29, 12/1 & 12/5). Dose of Venofer given 12/5 for possible iron-deficiency anemia. Prophy antibiotics x 24-hours post-op. Pain moderately controlled with PO Dilaudid (had been receiving 4 mg doses when experienced Syncope inpatient; now down to 2 mg with reports of poor pain control). Episode of ANSELMO - creatinine up to 2.09, estimated GFR 31 - improved to creatinine of 1.23, estimated GFR 57 12/4/18.     To note, patient previously had a left total arthroplasty and left below-the-knee amputation approximately 5 years ago in [place]. 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 It was noticed during hospitalization that there was a fissure on the left stump and concern for proper wound care (i.e.  malodorous, no sock between prosthesis & skin, etc.). Wound care orders from hospital in plan below.      Ischemic cardiomyopathy  Antiphospholipid antibody syndrome (H)  Paroxysmal atrial fibrillation (H)  Benign essential hypertension  Aortic Root Dilation  Echo 7/11/18 - EF 40-45% with anterior, septal & apical wall akinesis  Patient anticoagulated with chronic Xarelto d/t history of PE presumably related to his antiphospholipid antibody syndrome - held during immediately prior to and during surgery; restarted POD #1.   Coreg for chronic A-Fib.   Syncopal episode w/LOC during hospitalization (11/29/18 & 11/30/18) - etiology multifactorial (pain meds vs. vagal response vs. known aortic stenosis and aortic root dilation vs. Pulmonary hypertension vs. ?); recovered with fluids and RBCs.   Restarted Torsemide 12/2/18; US 12/3 to r/o DVT - recommend compression stockings.     Alcohol abuse  Possible alcoholic cirrhosis of the liver w/ascites  Patient reports h/o alcohol abuse, stopping in 2015 but ETOH level from 10/31/18 was 0.10.   Possible alcoholic cirrhosis w/ascites although this was no mentioned in CT imaging; had a paracentesis 10/30/18, 3.3L removed     Malignant Lymphoplasmacytic Lymphoma  Diagnosed via BMBX - no evidence of progression in BMBX September 2018. Followed by Dr. Raymond at [facility]    CODE STATUS/ADVANCE DIRECTIVES DISCUSSION:   CPR/Full code   Patient's living condition: lives with spouse in an apartment  One fall in the past 6 months - uses taylor, wheelchair, rolling walker, shower chair & grab bar at home at baseline    ALLERGIES:Ciprofloxacin and Hmg-coa-r inhibitors  PAST MEDICAL HISTORY:  has a past medical history of Alcoholism (H); Amputated left leg (H); Anemia; Anticardiolipin antibody syndrome (H); Antiphospholipid antibody syndrome (H); Antiplatelet or antithrombotic long-term use; Aortic root dilatation (H); Aortic stenosis; Arthritis; Balance problem; Coronary artery disease;  Gastro-oesophageal reflux disease; Heart attack (H) (2007); Hiatal hernia; Hypercholesterolemia; Hypertension; Ischemic cardiomyopathy; Left foot drop; Liver disease; 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  PAST SURGICAL HISTORY:  has a past surgical history that includes appendectomy; Endoscopic stripping vein(s); Cardiac surgery; IR IVC Filter Placement; colonoscopy; Eye surgery; Cholecystectomy; esophagogastroduodenoscopy; orthopedic surgery; orthopedic surgery; orthopedic surgery; Arthroplasty knee (Right, 9/19/2014); hernia repair; Abdomen surgery; Cystoscopy, dilate urethra, combined (N/A, 10/12/2016); Amputate leg below knee (Left, 04/2014); Incision and drainage lower extremity, combined (Left, 12/1/2017); Amputate leg below knee (Left, 12/4/2017); Irrigation and debridement lower extremity, combined (Left, 12/6/2017); Apply Wound Vac (Left, 12/6/2017); Irrigation and debridement lower extremity, combined (Left, 12/8/2017); Craniotomy (Right, 4/7/2018); Laser holmium lithotripsy ureter(s), insert stent, combined (Bilateral, 6/28/2018); Combined cystoscopy, retrogrades, exchange stent ureter(s) (Left, 7/24/2018); Cystoscopy, remove stent(s), combined (Right, 7/24/2018); Laser holmium lithotripsy ureter(s), insert stent, combined (Left, 8/14/2018); and Arthroplasty hip (Right, 11/27/2018).  FAMILY HISTORY: family history includes Heart Failure in his father; Lung Cancer in his mother.  SOCIAL HISTORY:  reports that he has never smoked. He has never used smokeless tobacco. He reports that he drinks alcohol. He reports that he does not use illicit  drugs.    Post Discharge Medication Reconciliation Status: discharge medications reconciled, continue medications without change.  Current Outpatient Prescriptions   Medication Sig Dispense Refill     aspirin 81 MG tablet Take 81 mg by mouth daily       carvedilol (COREG) 3.125 MG tablet Take 1 tablet (3.125 mg) by mouth 2 times daily (with meals) 180 tablet 1     fluticasone (FLONASE) 50 MCG/ACT spray Spray 1 spray into both nostrils daily       gabapentin (NEURONTIN) 600 MG tablet Take 1 tablet (600 mg) by mouth 3 times daily 270 tablet 3     HYDROmorphone (DILAUDID) 2 MG tablet Take 1 tablet (2 mg) by mouth every 4 hours as needed for breakthrough pain 30 tablet 0     hydrOXYzine (ATARAX) 10 MG tablet Take 1 tablet (10 mg) by mouth every 6 hours as needed for itching 30 tablet 0     nystatin (MYCOSTATIN) 702436 UNIT/GM external cream Apply topically 2 times daily       ondansetron (ZOFRAN-ODT) 4 MG ODT tab Take 1 tablet (4 mg) by mouth every 6 hours as needed for nausea or vomiting 30 tablet 0     Rivaroxaban (XARELTO PO) Take 20 mg by mouth daily       senna-docusate (SENOKOT-S/PERICOLACE) 8.6-50 MG tablet Take 1 tablet by mouth 2 times daily 30 tablet 0     torsemide (DEMADEX) 20 MG tablet Take 1 tablet (20 mg) by mouth daily for 3 days 3 tablet 0     torsemide (DEMADEX) 20 MG tablet Take 20 mg by mouth daily         ROS:  4 point ROS including Respiratory, CV, GI and , other than that noted in the HPI,  is negative    Exam:  /61  Pulse 62  Temp 99.4  F (37.4  C)  Resp 18  Ht 6' (1.829 m)  Wt 243 lb 12.8 oz (110.6 kg)  SpO2 95%  BMI 33.07 kg/m2  GENERAL APPEARANCE:  Alert, in no distress  ENT:  Mouth and posterior oropharynx normal, moist mucous membranes, hearing acuity intact  EYES:  EOM, conjunctivae, lids, pupils and irises normal  NECK:  No adenopathy,masses or thyromegaly  RESP:  respiratory effort normal, no respiratory distress, Lung sounds clear throughout  CV:  rate and rhythm  irregularly irregular rhythm (chronic a-fib), Grade 3 systolic murmur best heard over the RUSB, but present throughout, no rub or gallop, Edema 3+ in the right foot and 2+ in the entire right leg.   ABDOMEN:  normal bowel sounds, soft, nontender, no hepatosplenomegaly or other masses; distended r/t ascites  M/S:   Gait and station not observed, Digits and nails with mild thickening, no clubbing  SKIN: extensive bruising around the right hip, non-blanchable, incision with scant dried bloody drainage and sanguinous drainage on bandages, no expanding erythema or edema around incision. Left stump with approximately 4 cm x 2 cm crusting fissure - no expanding erythema or edema, mild ecchymosis near stump, blanchable.   NEURO: 2-12 in normal limits and at patient's baseline  PSYCH:  Insight, judgement, and memory intact, affect and mood normal    BP's 112/61, 111/66, 104/62    Lab/Diagnostic data:  CBC RESULTS:   Recent Labs   Lab Test  12/05/18   0729  12/04/18   1714  12/02/18   0652  12/01/18   0622   WBC   --    --   6.2  6.2   RBC   --    --   2.59*  2.28*   HGB  8.8*  8.0*  8.1*  7.1*   HCT   --    --   23.9*  21.3*   MCV   --    --   92  93   MCH   --    --   31.3  31.1   MCHC   --    --   33.9  33.3   RDW   --    --   16.6*  16.7*   PLT  125*   --   112*  94*       Last Basic Metabolic Panel:  Recent Labs   Lab Test  12/04/18   0744  12/03/18   0714  12/02/18   0652  12/01/18   0622   NA   --    --   133  132*   POTASSIUM   --    --   4.5  4.7   CHLORIDE   --    --   102  102   BENNIE   --    --   8.5  8.5   CO2   --    --   26  26   BUN   --    --   36*  35*   CR  1.23  1.29*  1.61*  2.09*   GLC   --    --   97  102*       Liver Function Studies -   Recent Labs   Lab Test  11/29/18   0714  11/08/18   1701   PROTTOTAL  6.4*  7.0   ALBUMIN  3.0*  3.0*   BILITOTAL  0.8  0.9   ALKPHOS  65  101   AST  25  34   ALT  10  17       TSH   Date Value Ref Range Status   10/31/2018 0.86 0.40 - 4.00 mU/L Final   10/31/2018  Canceled, Test credited, specimen discarded 0.40 - 4.00 mU/L Final     Comment:     Unsatisfactory specimen - hemolyzed     Lab Results   Component Value Date    A1C 4.6 05/18/2018       ASSESSMENT/PLAN:  (Z96.641) Status post total replacement of right hip  (primary encounter diagnosis)  (M16.11) Osteoarthritis of right hip, unspecified osteoarthritis type  (K59.00) Constipation   Comment: POD #9 s/p right TKA. Baseline creatinine 0.92 - 1.20 (estimated GFR 59 11/8/18 c/w stage III CKD). Last BM 1 week ago; still passing flatus, denies abdominal pain. Patient states poor pain control with constant ache rated 8/10; Dilaudid given at 6 pm and then not again until 4 am.   Plan: Ok to remove and replace outer dressing; DO NO touch prineo (mesh) inner dressing. CMP 12/7 d/t ANSELMO inpatient and liver testing as well considering ascites and patient is yellow-tinged today. CBC 12/7 d/t anemia post-op. Schedule Dilaudid 2 mg q4h vs. PRN before dose-increasing to 4 mg considering syncopal episodes inpatient at 4 mg. Schedule Hydroxyzine 10 mg q6h as adjuvant and add-on Lidocaine patches surrounding incision. Educated patient on the importance of ice post-op to help with inflammation. Avoid NSAIDs and Acetaminophen with kidney and liver impairment. Add Miralax w/Senna BID for bowel function. Follow-up with surgeon Dr. Howard 12/10 at 2 pm (appointment scheduled).     (Z89.098) History of left below knee amputation (H)  Comment: 4 cm x 2 cm fissure with brown crusting, no expanding erythema, edema or increased pain.   Plan: Plan of care for left stump: 2x/ day   1. Clean with gentle soap and water, dry and dry again.  Make sure is dry before putting on prosthetic..  2. Apply lotion down to the very distal aspect of the stump but not on the very bottom/ distal aspect- want that to become dry and callused  3. Apply very small dab of Vaseline just where the fissure is.  Goal is to keep that just a bit moist so it can heal but not  moisten the surround callus  4. Cover distal stump with one, dry 4x4 then a 4x4 that is opened and conformed to the stump  5. Wear a washable sleeve  6. Keep rubbery prosthetic clean    (R53.81) Physical deconditioning  Comment: delayed recovery and weakness per patient. Poor appetite (this is baseline).  Plan: Aggressive PT & OT as tolerated; recommend Vanilla/Boost ensures TID for nutrition (patient prefers Ensures and drinks these at home).     (I25.5) Ischemic cardiomyopathy  Plan: Torsemide 20 mg daily - continue considering RLE edema and HF (EF 35-40%). Follow-up with cardiologist Dr. Downs 12/19 at 10:45 am.     (I10) Benign essential hypertension  (I48.0) Paroxysmal atrial fibrillation (H)  Comment: BPs well-controlled at 100's-110's/60's.   Plan: Continue Coreg.     (D68.61) Antiphospholipid antibody syndrome (H)  Comment: Hx of PE; on chronic anticoagulant.   Plan: Continue Xarelto and baby aspirin.     (F10.10) Alcohol abuse  (K70.31) Alcoholic cirrhosis of liver with ascites (H)  Comment: no signs of alcohol withdrawal; patient's abdomen is taught r/t fluid/ascites. Used to do paracentesis every 2 weeks in [place]; states not as frequently here has he needs new orders every time. Last paracentesis 10/31/18.   Plan: Request paracentesis Tuesday, 12/11 if available.     (C83.00) Malignant Lymphoplasmacytic Lymphoma  Comment: Followed at [facility]; consider role of Lymphoma in anemia post-op.   Plan: last seen 10/25/18 - follow-up in 6 months (April 2019).    Orders:  CBC & CMP 12/7  Miralax 17 g daily  Schedule Dilaudid 2 mg q4h  Schedule Atarax 10 mg q6h  Schedule Lidocaine - two 5% patches on 12 hours, off 12 hours surrounding incision  Apply ice q4h or more frequently  Dressing change orders per above  Lymphedema wraps to be evaluated daily by OT  Vanilla ensure/boost TID  Paracentesis next week (ideally 12/11)    The above evaluation was completed by DIPAK Cantu and transcribed by Mary  GISSEL Burr student.          Electronically signed by:  DIPAK Huggins CNP                    Sincerely,        DIPAK Huggins CNP

## 2018-12-06 NOTE — PROGRESS NOTES
Clinic Care Coordination Contact  Care Coordination Transition Communication    Referral Source: IP Handoff    Clinical Data:   Patient was hospitalized at [facility] from 11/27/2018 to 12/03/2018 with diagnosis of DJD right hip. He had a total hip arthroplasty/replacement on 11/27/18.     Medical history:    Alcohol abuse     Subdural hematoma     Pulmonary hypertension     Alcoholic cirrhosis of liver     Malabsorption of iron     Chronic congestive heart failure     Benign essential hypertension     Coronary artery disease     Aortic stenosis     Nonrheumatic mitral valve regurgitation     Nonrheumatic tricuspid valve regurgitation     Paroxysmal atrial fibrillation     Ischemic cardiomyopathy     Aortic root dilatation     Venous thrombosis     History of left below knee amputation     Antiphospholipid antibody syndrome     Chronic kidney disease, stage III     Prostate cancer -- S/P Radiative Seed implants in 2000     Calculi, ureter     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     Thrombocytopenia -- suspect related to alcohol use     Anemia     Physical deconditioning     Transition to Facility:           [facility]           Contact phone number: [phone]                              fax number: [phone]    Plan:   RN Care Coordinator will await notification from facility staff informing of patient's discharge plans/needs. RN Care Coordinator will review chart and outreach to facility staff every 4 weeks and as needed.       Diane Chaidez RN   Care Coordinator  [facility]  Phone:  [phone]  Email: [email or web address]

## 2018-12-06 NOTE — PROGRESS NOTES
[facility]  PRIMARY CARE PROVIDER AND CLINIC:  Antony Main MARROQUIN [address] / [place] [address]  Chief Complaint   Patient presents with     Hospital F/U     [facility] Medical Record Number:  [id number]  Place of Service where encounter took place:  [facility] [[id number]]    HPI:    Antonio Ramirez is a 73 year old  (1945),admitted to the above facility from  [facility].  Hospital stay 11/27/2018 through 12/5/2018.  Admitted to this facility for  rehab, medical management and nursing care.  HPI information obtained from: patient report, [facility] chart review and family/first contact [place] report.  Current issues are:        Status post total replacement of right hip  Osteoarthritis of right hip, unspecified osteoarthritis type  Physical deconditioning  Patient underwent right total hip arthroplasty at [facility] (primary surgeon Dr. Howard) on 11/27/18 d/t hx of osteoarthritis of the right hip. EBL 1200 mL - required 3 units PRBCs inpatient (11/29, 12/1 & 12/5). Dose of Venofer given 12/5 for possible iron-deficiency anemia. Prophy antibiotics x 24-hours post-op. Pain moderately controlled with PO Dilaudid (had been receiving 4 mg doses when experienced Syncope inpatient; now down to 2 mg with reports of poor pain control). Episode of ANSELMO - creatinine up to 2.09, estimated GFR 31 - improved to creatinine of 1.23, estimated GFR 57 12/4/18.     To note, patient previously had a left total arthroplasty and left below-the-knee amputation approximately 5 years ago in [place]. 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 It was noticed during hospitalization that there was a fissure on the left stump and concern for proper wound care (i.e. malodorous, no sock between prosthesis & skin, etc.). Wound care orders from hospital in plan  below.      Ischemic cardiomyopathy  Antiphospholipid antibody syndrome (H)  Paroxysmal atrial fibrillation (H)  Benign essential hypertension  Aortic Root Dilation  Echo 7/11/18 - EF 40-45% with anterior, septal & apical wall akinesis  Patient anticoagulated with chronic Xarelto d/t history of PE presumably related to his antiphospholipid antibody syndrome - held during immediately prior to and during surgery; restarted POD #1.   Coreg for chronic A-Fib.   Syncopal episode w/LOC during hospitalization (11/29/18 & 11/30/18) - etiology multifactorial (pain meds vs. vagal response vs. known aortic stenosis and aortic root dilation vs. Pulmonary hypertension vs. ?); recovered with fluids and RBCs.   Restarted Torsemide 12/2/18; US 12/3 to r/o DVT - recommend compression stockings.     Alcohol abuse  Possible alcoholic cirrhosis of the liver w/ascites  Patient reports h/o alcohol abuse, stopping in 2015 but ETOH level from 10/31/18 was 0.10.   Possible alcoholic cirrhosis w/ascites although this was no mentioned in CT imaging; had a paracentesis 10/30/18, 3.3L removed     Malignant Lymphoplasmacytic Lymphoma  Diagnosed via BMBX - no evidence of progression in BMBX September 2018. Followed by Dr. Raymond at [facility]    CODE STATUS/ADVANCE DIRECTIVES DISCUSSION:   CPR/Full code   Patient's living condition: lives with spouse in an apartment  One fall in the past 6 months - uses taylor, wheelchair, rolling walker, shower chair & grab bar at home at baseline    ALLERGIES:Ciprofloxacin and Hmg-coa-r inhibitors  PAST MEDICAL HISTORY:  has a past medical history of Alcoholism (H); Amputated left leg (H); Anemia; Anticardiolipin antibody syndrome (H); Antiphospholipid antibody syndrome (H); Antiplatelet or antithrombotic long-term use; Aortic root dilatation (H); Aortic stenosis; Arthritis; Balance problem; Coronary artery disease; Gastro-oesophageal reflux disease; Heart attack (H) (2007); Hiatal hernia;  Hypercholesterolemia; Hypertension; Ischemic cardiomyopathy; Left foot drop; Liver disease; 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  PAST SURGICAL HISTORY:  has a past surgical history that includes appendectomy; Endoscopic stripping vein(s); Cardiac surgery; IR IVC Filter Placement; colonoscopy; Eye surgery; Cholecystectomy; esophagogastroduodenoscopy; orthopedic surgery; orthopedic surgery; orthopedic surgery; Arthroplasty knee (Right, 9/19/2014); hernia repair; Abdomen surgery; Cystoscopy, dilate urethra, combined (N/A, 10/12/2016); Amputate leg below knee (Left, 04/2014); Incision and drainage lower extremity, combined (Left, 12/1/2017); Amputate leg below knee (Left, 12/4/2017); Irrigation and debridement lower extremity, combined (Left, 12/6/2017); Apply Wound Vac (Left, 12/6/2017); Irrigation and debridement lower extremity, combined (Left, 12/8/2017); Craniotomy (Right, 4/7/2018); Laser holmium lithotripsy ureter(s), insert stent, combined (Bilateral, 6/28/2018); Combined cystoscopy, retrogrades, exchange stent ureter(s) (Left, 7/24/2018); Cystoscopy, remove stent(s), combined (Right, 7/24/2018); Laser holmium lithotripsy ureter(s), insert stent, combined (Left, 8/14/2018); and Arthroplasty hip (Right, 11/27/2018).  FAMILY HISTORY: family history includes Heart Failure in his father; Lung Cancer in his mother.  SOCIAL HISTORY:  reports that he has never smoked. He has never used smokeless tobacco. He reports that he drinks alcohol. He reports that he does not use illicit drugs.    Post Discharge Medication Reconciliation Status: discharge medications  reconciled, continue medications without change.  Current Outpatient Prescriptions   Medication Sig Dispense Refill     aspirin 81 MG tablet Take 81 mg by mouth daily       carvedilol (COREG) 3.125 MG tablet Take 1 tablet (3.125 mg) by mouth 2 times daily (with meals) 180 tablet 1     fluticasone (FLONASE) 50 MCG/ACT spray Spray 1 spray into both nostrils daily       gabapentin (NEURONTIN) 600 MG tablet Take 1 tablet (600 mg) by mouth 3 times daily 270 tablet 3     HYDROmorphone (DILAUDID) 2 MG tablet Take 1 tablet (2 mg) by mouth every 4 hours as needed for breakthrough pain 30 tablet 0     hydrOXYzine (ATARAX) 10 MG tablet Take 1 tablet (10 mg) by mouth every 6 hours as needed for itching 30 tablet 0     nystatin (MYCOSTATIN) 580777 UNIT/GM external cream Apply topically 2 times daily       ondansetron (ZOFRAN-ODT) 4 MG ODT tab Take 1 tablet (4 mg) by mouth every 6 hours as needed for nausea or vomiting 30 tablet 0     Rivaroxaban (XARELTO PO) Take 20 mg by mouth daily       senna-docusate (SENOKOT-S/PERICOLACE) 8.6-50 MG tablet Take 1 tablet by mouth 2 times daily 30 tablet 0     torsemide (DEMADEX) 20 MG tablet Take 1 tablet (20 mg) by mouth daily for 3 days 3 tablet 0     torsemide (DEMADEX) 20 MG tablet Take 20 mg by mouth daily         ROS:  4 point ROS including Respiratory, CV, GI and , other than that noted in the HPI,  is negative    Exam:  /61  Pulse 62  Temp 99.4  F (37.4  C)  Resp 18  Ht 6' (1.829 m)  Wt 243 lb 12.8 oz (110.6 kg)  SpO2 95%  BMI 33.07 kg/m2  GENERAL APPEARANCE:  Alert, in no distress  ENT:  Mouth and posterior oropharynx normal, moist mucous membranes, hearing acuity intact  EYES:  EOM, conjunctivae, lids, pupils and irises normal  NECK:  No adenopathy,masses or thyromegaly  RESP:  respiratory effort normal, no respiratory distress, Lung sounds clear throughout  CV:  rate and rhythm irregularly irregular rhythm (chronic a-fib), Grade 3 systolic murmur best heard over the  RUSB, but present throughout, no rub or gallop, Edema 3+ in the right foot and 2+ in the entire right leg.   ABDOMEN:  normal bowel sounds, soft, nontender, no hepatosplenomegaly or other masses; distended r/t ascites  M/S:   Gait and station not observed, Digits and nails with mild thickening, no clubbing  SKIN: extensive bruising around the right hip, non-blanchable, incision with scant dried bloody drainage and sanguinous drainage on bandages, no expanding erythema or edema around incision. Left stump with approximately 4 cm x 2 cm crusting fissure - no expanding erythema or edema, mild ecchymosis near stump, blanchable.   NEURO: 2-12 in normal limits and at patient's baseline  PSYCH:  Insight, judgement, and memory intact, affect and mood normal    BP's 112/61, 111/66, 104/62    Lab/Diagnostic data:  CBC RESULTS:   Recent Labs   Lab Test  12/05/18   0729  12/04/18   1714  12/02/18   0652  12/01/18   0622   WBC   --    --   6.2  6.2   RBC   --    --   2.59*  2.28*   HGB  8.8*  8.0*  8.1*  7.1*   HCT   --    --   23.9*  21.3*   MCV   --    --   92  93   MCH   --    --   31.3  31.1   MCHC   --    --   33.9  33.3   RDW   --    --   16.6*  16.7*   PLT  125*   --   112*  94*       Last Basic Metabolic Panel:  Recent Labs   Lab Test  12/04/18   0744  12/03/18   0714  12/02/18   0652  12/01/18   0622   NA   --    --   133  132*   POTASSIUM   --    --   4.5  4.7   CHLORIDE   --    --   102  102   BENNIE   --    --   8.5  8.5   CO2   --    --   26  26   BUN   --    --   36*  35*   CR  1.23  1.29*  1.61*  2.09*   GLC   --    --   97  102*       Liver Function Studies -   Recent Labs   Lab Test  11/29/18   0714  11/08/18   1701   PROTTOTAL  6.4*  7.0   ALBUMIN  3.0*  3.0*   BILITOTAL  0.8  0.9   ALKPHOS  65  101   AST  25  34   ALT  10  17       TSH   Date Value Ref Range Status   10/31/2018 0.86 0.40 - 4.00 mU/L Final   10/31/2018 Canceled, Test credited, specimen discarded 0.40 - 4.00 mU/L Final     Comment:      Unsatisfactory specimen - hemolyzed     Lab Results   Component Value Date    A1C 4.6 05/18/2018       ASSESSMENT/PLAN:  (Z96.641) Status post total replacement of right hip  (primary encounter diagnosis)  (M16.11) Osteoarthritis of right hip, unspecified osteoarthritis type  (K59.00) Constipation   Comment: POD #9 s/p right TKA. Baseline creatinine 0.92 - 1.20 (estimated GFR 59 11/8/18 c/w stage III CKD). Last BM 1 week ago; still passing flatus, denies abdominal pain. Patient states poor pain control with constant ache rated 8/10; Dilaudid given at 6 pm and then not again until 4 am.   Plan: Ok to remove and replace outer dressing; DO NO touch prineo (mesh) inner dressing. CMP 12/7 d/t ANSELMO inpatient and liver testing as well considering ascites and patient is yellow-tinged today. CBC 12/7 d/t anemia post-op. Schedule Dilaudid 2 mg q4h vs. PRN before dose-increasing to 4 mg considering syncopal episodes inpatient at 4 mg. Schedule Hydroxyzine 10 mg q6h as adjuvant and add-on Lidocaine patches surrounding incision. Educated patient on the importance of ice post-op to help with inflammation. Avoid NSAIDs and Acetaminophen with kidney and liver impairment. Add Miralax w/Senna BID for bowel function. Follow-up with surgeon Dr. Howard 12/10 at 2 pm (appointment scheduled).     (Z89.601) History of left below knee amputation (H)  Comment: 4 cm x 2 cm fissure with brown crusting, no expanding erythema, edema or increased pain.   Plan: Plan of care for left stump: 2x/ day   1. Clean with gentle soap and water, dry and dry again.  Make sure is dry before putting on prosthetic..  2. Apply lotion down to the very distal aspect of the stump but not on the very bottom/ distal aspect- want that to become dry and callused  3. Apply very small dab of Vaseline just where the fissure is.  Goal is to keep that just a bit moist so it can heal but not moisten the surround callus  4. Cover distal stump with one, dry 4x4 then a 4x4  that is opened and conformed to the stump  5. Wear a washable sleeve  6. Keep rubbery prosthetic clean    (R53.81) Physical deconditioning  Comment: delayed recovery and weakness per patient. Poor appetite (this is baseline).  Plan: Aggressive PT & OT as tolerated; recommend Vanilla/Boost ensures TID for nutrition (patient prefers Ensures and drinks these at home).     (I25.5) Ischemic cardiomyopathy  Plan: Torsemide 20 mg daily - continue considering RLE edema and HF (EF 35-40%). Follow-up with cardiologist Dr. Downs 12/19 at 10:45 am.     (I10) Benign essential hypertension  (I48.0) Paroxysmal atrial fibrillation (H)  Comment: BPs well-controlled at 100's-110's/60's.   Plan: Continue Coreg.     (D68.61) Antiphospholipid antibody syndrome (H)  Comment: Hx of PE; on chronic anticoagulant.   Plan: Continue Xarelto and baby aspirin.     (F10.10) Alcohol abuse  (K70.31) Alcoholic cirrhosis of liver with ascites (H)  Comment: no signs of alcohol withdrawal; patient's abdomen is taught r/t fluid/ascites. Used to do paracentesis every 2 weeks in [place]; states not as frequently here has he needs new orders every time. Last paracentesis 10/31/18.   Plan: Request paracentesis Tuesday, 12/11 if available.     (C83.00) Malignant Lymphoplasmacytic Lymphoma  Comment: Followed at [facility]; consider role of Lymphoma in anemia post-op.   Plan: last seen 10/25/18 - follow-up in 6 months (April 2019).    Orders:  CBC & CMP 12/7  Miralax 17 g daily  Schedule Dilaudid 2 mg q4h  Schedule Atarax 10 mg q6h  Schedule Lidocaine - two 5% patches on 12 hours, off 12 hours surrounding incision  Apply ice q4h or more frequently  Dressing change orders per above  Lymphedema wraps to be evaluated daily by OT  Vanilla ensure/boost TID  Paracentesis next week (ideally 12/11)    The above evaluation was completed by DIPAK Cantu and transcribed by GISSEL Arreguin student.          Electronically signed by:  DIPAK Huggins  CNP

## 2018-12-06 NOTE — PLAN OF CARE
Problem: Patient Care Overview  Goal: Plan of Care/Patient Progress Review  Occupational Therapy Discharge Summary    Reason for therapy discharge:    Discharged to transitional care facility.    Progress towards therapy goal(s). See goals on Care Plan in [facility] record for goal details.  Goals not met.  Barriers to achieving goals:   discharge from facility.    Therapy recommendation(s):    Continued therapy is recommended.  Rationale/Recommendations:  Pt requires cont'd therapy to increase I with ADLs such as dressing, toileting, grooming, and functional mobility to return to PLOF.

## 2018-12-07 ENCOUNTER — NURSING HOME VISIT (OUTPATIENT)
Dept: GERIATRICS | Facility: CLINIC | Age: 73
End: 2018-12-07
Payer: MEDICARE

## 2018-12-07 ENCOUNTER — HOSPITAL LABORATORY (OUTPATIENT)
Dept: OTHER | Facility: CLINIC | Age: 73
End: 2018-12-07

## 2018-12-07 DIAGNOSIS — I25.10 CORONARY ARTERY DISEASE INVOLVING NATIVE CORONARY ARTERY OF NATIVE HEART WITHOUT ANGINA PECTORIS: ICD-10-CM

## 2018-12-07 DIAGNOSIS — D62 ANEMIA DUE TO BLOOD LOSS, ACUTE: ICD-10-CM

## 2018-12-07 DIAGNOSIS — Z89.512 STATUS POST BELOW KNEE AMPUTATION OF LEFT LOWER EXTREMITY: ICD-10-CM

## 2018-12-07 DIAGNOSIS — D68.61 ANTIPHOSPHOLIPID ANTIBODY SYNDROME (H): ICD-10-CM

## 2018-12-07 DIAGNOSIS — Z86.711 HISTORY OF PULMONARY EMBOLISM: ICD-10-CM

## 2018-12-07 DIAGNOSIS — M16.11 PRIMARY OSTEOARTHRITIS OF RIGHT HIP: ICD-10-CM

## 2018-12-07 DIAGNOSIS — Z96.641 STATUS POST TOTAL REPLACEMENT OF RIGHT HIP: ICD-10-CM

## 2018-12-07 DIAGNOSIS — Z95.1 S/P CABG (CORONARY ARTERY BYPASS GRAFT): ICD-10-CM

## 2018-12-07 DIAGNOSIS — I48.0 PAROXYSMAL ATRIAL FIBRILLATION (H): ICD-10-CM

## 2018-12-07 DIAGNOSIS — I25.5 ISCHEMIC CARDIOMYOPATHY: ICD-10-CM

## 2018-12-07 DIAGNOSIS — R53.81 PHYSICAL DECONDITIONING: Primary | ICD-10-CM

## 2018-12-07 DIAGNOSIS — I10 BENIGN ESSENTIAL HYPERTENSION: ICD-10-CM

## 2018-12-07 LAB
ALBUMIN SERPL-MCNC: 2.6 G/DL (ref 3.4–5)
ALBUMIN UR-MCNC: NEGATIVE MG/DL
ALP SERPL-CCNC: 72 U/L (ref 40–150)
ALT SERPL W P-5'-P-CCNC: 9 U/L (ref 0–70)
ANION GAP SERPL CALCULATED.3IONS-SCNC: 8 MMOL/L (ref 3–14)
APPEARANCE UR: CLEAR
AST SERPL W P-5'-P-CCNC: 26 U/L (ref 0–45)
BACTERIA #/AREA URNS HPF: ABNORMAL /HPF
BILIRUB SERPL-MCNC: 3 MG/DL (ref 0.2–1.3)
BILIRUB UR QL STRIP: NEGATIVE
BUN SERPL-MCNC: 36 MG/DL (ref 7–30)
CALCIUM SERPL-MCNC: 8.8 MG/DL (ref 8.5–10.1)
CHLORIDE SERPL-SCNC: 101 MMOL/L (ref 94–109)
CO2 SERPL-SCNC: 28 MMOL/L (ref 20–32)
COLOR UR AUTO: YELLOW
CREAT SERPL-MCNC: 1.23 MG/DL (ref 0.66–1.25)
ERYTHROCYTE [DISTWIDTH] IN BLOOD BY AUTOMATED COUNT: 16.5 % (ref 10–15)
GFR SERPL CREATININE-BSD FRML MDRD: 57 ML/MIN/1.7M2
GLUCOSE SERPL-MCNC: 81 MG/DL (ref 70–99)
GLUCOSE UR STRIP-MCNC: NEGATIVE MG/DL
HCT VFR BLD AUTO: 26.6 % (ref 40–53)
HGB BLD-MCNC: 9 G/DL (ref 13.3–17.7)
HGB UR QL STRIP: NEGATIVE
HYALINE CASTS #/AREA URNS LPF: 5 /LPF (ref 0–2)
KETONES UR STRIP-MCNC: NEGATIVE MG/DL
LEUKOCYTE ESTERASE UR QL STRIP: ABNORMAL
MCH RBC QN AUTO: 30.7 PG (ref 26.5–33)
MCHC RBC AUTO-ENTMCNC: 33.8 G/DL (ref 31.5–36.5)
MCV RBC AUTO: 91 FL (ref 78–100)
MUCOUS THREADS #/AREA URNS LPF: PRESENT /LPF
NITRATE UR QL: NEGATIVE
PH UR STRIP: 5 PH (ref 5–7)
PLATELET # BLD AUTO: 190 10E9/L (ref 150–450)
POTASSIUM SERPL-SCNC: 3.8 MMOL/L (ref 3.4–5.3)
PROT SERPL-MCNC: 6.2 G/DL (ref 6.8–8.8)
RBC # BLD AUTO: 2.93 10E12/L (ref 4.4–5.9)
RBC #/AREA URNS AUTO: 0 /HPF (ref 0–2)
SODIUM SERPL-SCNC: 137 MMOL/L (ref 133–144)
SOURCE: ABNORMAL
SP GR UR STRIP: 1.01 (ref 1–1.03)
SQUAMOUS #/AREA URNS AUTO: 1 /HPF (ref 0–1)
UROBILINOGEN UR STRIP-MCNC: NORMAL MG/DL (ref 0–2)
WBC # BLD AUTO: 7.4 10E9/L (ref 4–11)
WBC #/AREA URNS AUTO: 10 /HPF (ref 0–5)

## 2018-12-07 PROCEDURE — 99306 1ST NF CARE HIGH MDM 50: CPT | Performed by: INTERNAL MEDICINE

## 2018-12-07 NOTE — PROGRESS NOTES
PRIMARY CARE PROVIDER AND CLINIC RESPONSIBLE:  Main Hardy, [address] [address] / [place] [address]        ADMISSION HISTORY AND PHYSICAL EXAMINATION     Chief Complaint   Patient presents with     Fatigue     Musculoskeletal Problem         HISTORY OF PRESENT ILLNESS:  73 year old male, (1945), admitted to the [facility] for continuation of medical care and rehab.  HPI information obtained from: facility chart records, facility staff, patient report and [facility] chart review.    Antonio Ramirez is a 73 year old male with history of pulmonary HTN, paroxysmal a fib, antiphospholipid antibody with DVT/PE currently anticoagulated on Xarelto, CAD s/p CABG 2007, ischemic cardiomypathy, HTN, hyperlipidemia, hx of large B-cell lymphoma, prostate cancer, alcoholic cirrhosis and previous left BKA and left TOBI who was admitted at [facility] from 11/27 to 12/5/18 due to elective right total hip arthroplasty on 11/27/18. EBL was 1200 ml. Post-op course was significant acute blood loss anemia with 3 units of blood transfusions. He had also an episode of syncope, acute renal failure. Treated with IV fluids. He had stump wound. He has mild pain at right hip. Slept poorly, appetite poor and no BM for several days. Patient denies chest pain, dyspnea, abdominal pain, n&v, fever, chills, dysuria. The rest of ROS is unremarkable. Patient is seen and examined by Oksana Najera NP. Please see IRENA Najera's admit noted dated 12/6/18 for details of admission, past medical history, family history, allergies, medication list, social history and other details pertinent with this admission. Hospital admission and dc summary reviewed.    Past Medical History:   Diagnosis Date     Alcoholism (H)      Amputated left leg (H)      Anemia      Anticardiolipin antibody syndrome (H)      Antiphospholipid antibody syndrome (H)      Antiplatelet or antithrombotic long-term use      Aortic root dilatation (H)      Aortic  stenosis      Arthritis      Balance problem      Coronary artery disease     CABG 2007: SVG to LAD, SVG to diagonal, SVG to OM; cardiac cath 5/17/18: thrombectomy for restenosis of stents-sent for urgent CABG, cath 5/14/2007: GRECIA x2 to LAD, G[place]ia to diagonal     Gastro-oesophageal reflux disease      Heart attack (H) 2007    apparently arrested, cardiac X5     Hiatal hernia      Hypercholesterolemia      Hypertension      Ischemic cardiomyopathy      Left foot drop      Liver disease     alcoholic liver disease, alcoholic cirrohsosis, portal hypertension     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)     ?When diagnosed, patient not aware of this     Moderate mitral regurgitation      Monoclonal gammopathy      Neuropathy      Noninfectious ileitis     diverticulitis of colon     Nonrheumatic tricuspid valve regurgitation      Paroxysmal atrial fibrillation (H)      PE (pulmonary embolism) 2006    saddle emboli 2006     Prostate cancer (H) 2000    Treated with radiation (seed implants in 2000) -- no problems since     Pulmonary hypertension (H)      Renal disease     kidney stones     Syncope      Thrombocytopenia (H)      Urethral stricture      Venous thrombosis     IVC filter and lysis procedures 2007       Past Surgical History:   Procedure Laterality Date     AMPUTATE LEG BELOW KNEE Left 04/2014    related to gangrene, started as foot ulcer related to neuropathy     AMPUTATE LEG BELOW KNEE Left 12/4/2017    Procedure: AMPUTATE LEG BELOW KNEE;  Exploration of Left Leg Below Knee Amputation Stump with Ligation of Bleeders.;  Surgeon: Leandro Arreola MD;  Location:  OR     APPENDECTOMY       APPLY WOUND VAC Left 12/6/2017    Procedure: APPLY WOUND VAC;;  Surgeon: Saima Howard MD;  Location:  SD     ARTHROPLASTY HIP Right 11/27/2018    Procedure: RIGHT TOTAL HIP ARTHROPLASTY;  Surgeon: Saima Howard MD;  Location:  OR     ARTHROPLASTY KNEE Right 9/19/2014    Procedure: ARTHROPLASTY KNEE;   Surgeon: Saima Howard MD;  Location:  OR     CARDIAC SURGERY      CABG     CHOLECYSTECTOMY       COLONOSCOPY       COMBINED CYSTOSCOPY, RETROGRADES, EXCHANGE STENT URETER(S) Left 7/24/2018    Procedure: COMBINED CYSTOSCOPY, RETROGRADES, EXCHANGE STENT URETER(S);  CYSTOSCOPY, BILATERAL RETROGRADES, BILATERAL URETERAL STENT EXCHANGE ;  Surgeon: Matt Cervantes MD;  Location:  OR     CRANIOTOMY Right 4/7/2018    Procedure: CRANIOTOMY;  Right Craniotomy For Subdural Hematoma;  Surgeon: Jono Issa MD;  Location:  OR     CYSTOSCOPY, DILATE URETHRA, COMBINED N/A 10/12/2016    Procedure: COMBINED CYSTOSCOPY, DILATE URETHRA;  Surgeon: Dimitry Bello MD;  Location:  OR     CYSTOSCOPY, REMOVE STENT(S), COMBINED Right 7/24/2018    Procedure: COMBINED CYSTOSCOPY, REMOVE STENT(S);;  Surgeon: Jose Hunter MD;  Location:  OR     ENDOSCOPIC STRIPPING VEIN(S)       esophagogastroduodenoscopy       EYE SURGERY      cataracts     GENITOURINARY SURGERY      Prostate Seen Implants     HERNIA REPAIR      Umbilical     INCISION AND DRAINAGE LOWER EXTREMITY, COMBINED Left 12/1/2017    Procedure: COMBINED INCISION AND DRAINAGE LOWER EXTREMITY;  INCISION AND DRAINAGE OF LEFT BELOW KNEE AMPUTATION ABSCESS, WOUND VAC PLACEMENT;  Surgeon: Saima Howard MD;  Location:  OR     IR IVC FILTER PLACEMENT       IRRIGATION AND DEBRIDEMENT LOWER EXTREMITY, COMBINED Left 12/6/2017    Procedure: COMBINED IRRIGATION AND DEBRIDEMENT LOWER EXTREMITY;  IRRIGATION AND DEBRIDEMENT OF LEFT BELOW KNEE AMPUTATION SITE WITH WOUND VAC REPLACEMENT;  Surgeon: Saima Howard MD;  Location:  SD     IRRIGATION AND DEBRIDEMENT LOWER EXTREMITY, COMBINED Left 12/8/2017    Procedure: COMBINED IRRIGATION AND DEBRIDEMENT LOWER EXTREMITY;  IRRIGATION AND DEBRIDEMENT OF LEFT BELOW THE KNEE AMPUTATION AND SHORTENING OF THE TIBIA WITH WOUND CLOSURE;  Surgeon: Saima Howard MD;  Location:  OR     LASER HOLMIUM  LITHOTRIPSY URETER(S), INSERT STENT, COMBINED Bilateral 6/28/2018    Procedure: COMBINED CYSTOSCOPY, URETEROSCOPY, LASER HOLMIUM LITHOTRIPSY URETER(S), INSERT STENT;  CYSTOSCOPY, BILATERAL URETEROSCOPY,  HOLMIUM LASER LITHOTRIPSY, BILATERAL STENT PLACEMENT, STONE BASKETING;  Surgeon: Jose Hunter MD;  Location:  OR     LASER HOLMIUM LITHOTRIPSY URETER(S), INSERT STENT, COMBINED Left 8/14/2018    Procedure: COMBINED CYSTOSCOPY, URETEROSCOPY, LASER HOLMIUM LITHOTRIPSY URETER(S), INSERT STENT;  CYSTOSCOPY, LEFT URETEROSCOPY, LEFT LASER HOLMIUM LITHOTRIPSY URETER(S) REMOVAL BILATERAL STENTS;  Surgeon: Jose Hunter MD;  Location:  OR     ORTHOPEDIC SURGERY      (R) knee scope     ORTHOPEDIC SURGERY      total hip      ORTHOPEDIC SURGERY      elbow surgery       Current Outpatient Prescriptions   Medication Sig     aspirin 81 MG tablet Take 81 mg by mouth daily     carvedilol (COREG) 3.125 MG tablet Take 1 tablet (3.125 mg) by mouth 2 times daily (with meals)     fluticasone (FLONASE) 50 MCG/ACT spray Spray 1 spray into both nostrils daily     GABAPENTIN PO Take 600 mg by mouth 2 times daily     HYDROmorphone (DILAUDID) 2 MG tablet Take 1 tablet (2 mg) by mouth every 4 hours as needed for breakthrough pain     hydrOXYzine (ATARAX) 10 MG tablet Take 1 tablet (10 mg) by mouth every 6 hours as needed for itching     lidocaine (LIDODERM) 5 % patch Place 2 patches onto the skin every 24 hours     nystatin (MYCOSTATIN) 936397 UNIT/GM external cream Apply topically 2 times daily     ondansetron (ZOFRAN-ODT) 4 MG ODT tab Take 1 tablet (4 mg) by mouth every 6 hours as needed for nausea or vomiting     polyethylene glycol (MIRALAX/GLYCOLAX) packet Take 17 g by mouth daily     Rivaroxaban (XARELTO PO) Take 20 mg by mouth daily     senna-docusate (SENOKOT-S/PERICOLACE) 8.6-50 MG tablet Take 1 tablet by mouth 2 times daily     TORSEMIDE PO Take 20 mg by mouth daily     No current facility-administered medications for  "this visit.        Allergies   Allergen Reactions     Ciprofloxacin Itching     Severe itching \"like ants were crawling\"     Hmg-Coa-R Inhibitors Other (See Comments)     Rhabdomyolysis occurred within a couple days       Social History     Social History     Marital status:      Spouse name: N/A     Number of children: 2     Years of education: N/A     Occupational History     Retired       Social History Main Topics     Smoking status: Never Smoker     Smokeless tobacco: Never Used     Alcohol use Yes      Comment: quit 10/2015; now 3-4 Vodkas/night     Drug use: No     Sexual activity: Not Currently     Other Topics Concern     Not on file     Social History Narrative    , 2 children, retired . He does not have a living will but desires full code.  (last updated 11/29/2017)           Information reviewed:  Medications, vital signs, orders, nursing notes, problem list, hospital information.     ROS: All 10 point review of system completed, those pertinent positive, please see H&P, the remaining ROS is negative.    /61  Pulse 62  Temp 99.4  F (37.4  C)  Resp 18  Ht 6' (1.829 m)  Wt 243 lb 12.8 oz (110.6 kg)  SpO2 95%  BMI 33.07 kg/m2    PHYSICAL EXAMINATION:   GENERAL:  No acute distress.  SKIN:  Dry and warm.  There is no rash at area of skin examined.  HEENT:  Head without trauma.  Pupils round, reactive. Exam of conjunctiva and lids are normal. Sclera without icterus. There is no oral thrush.  NECK:  Supple. No jugular venous distension.  CHEST: No reproducible chest tenderness.   LUNGS:  Normal respiratory effort. Lungs reveal decreased breath sounds at bases. No rales or wheezes.  HEART:  Regular rate and rhythm.  2/6 murmur, but no gallops or rubs auscultated.  ABDOMEN:  Soft, bowel sounds positive.  There is no tenderness or guarding.   EXTREMITIES: +1 edema right leg and thigh. Right hip surgical site with dressing, mild tenderness. S/p left BKA with " prosthesis.  NEUROLOGIC:  Alert and oriented x3. Moving all ext. Gait not tested.  PSYCH: Recent and remote memory is normal. Mood and affect are normal.    Lab/Diagnostic data:  Reviewed    CBC Lab Results (Last 5 results in 365 days)       WBC RBC Hgb Hct MCV MCH MCHC RDW Plt Neut # Lymph # Eos # Baso # Immat. Gran #   12/05/18 0729 -- -- -- -- -- -- -- -- 125 -- -- -- -- --   12/05/18 0729 -- -- 8.8 -- -- -- -- -- -- -- -- -- -- --   12/04/18 1714 -- -- 8.0 -- -- -- -- -- -- -- -- -- -- --   12/02/18 0652 6.2 2.59 8.1 23.9 92 31.3 33.9 16.6 112 -- -- -- -- --   12/01/18 0622 6.2 2.28 7.1 21.3 93 31.1 33.3 16.7 94 -- -- -- -- --   CMP Labs (Last 5 results in 365 days)       Na+ K+ Cl CO2 ANION GAP Glc BUN Creat GFR GFR-Black Calcium Mg ALT AST A1C TSH LDL HIV   12/04/18 0744 -- -- -- -- -- -- -- 1.23 57 69 -- -- -- -- -- -- -- --   12/03/18 0714 -- -- -- -- -- -- -- 1.29 54 66 -- -- -- -- -- -- -- --   12/02/18 0652 133 4.5 102 26 5 97 36 1.61 42 51 8.5 -- -- -- -- -- -- --   12/01/18 0622 132 4.7 102 26 4 102 35 2.09 31 38 8.5 -- -- -- -- -- -- --   11/30/18 1529 -- -- -- -- -- 122 -- -- -- -- -- -- -- -- -- -- -- --     Liver Function Studies -   Recent Labs   Lab Test  12/07/18   0700   PROTTOTAL  6.2*   ALBUMIN  2.6*   BILITOTAL  3.0*   ALKPHOS  72   AST  26   ALT  9       Results for orders placed or performed during the hospital encounter of 11/27/18   XR Pelvis Port 1/2 Views    Narrative    PELVIS ONE TO TWO VIEW RIGHT November 27, 2018 10:05 AM     HISTORY: Intraoperative pelvis film.    COMPARISON: None.      Impression    IMPRESSION: Right hip arthroplasty spacer in anatomic alignment. No  acute osseous abnormality demonstrated.    CARMEN SERRATO MD   US Lower Extremity Venous Duplex Right Port    Narrative    ULTRASOUND LOWER EXTREMITY VENOUS DUPLEX RIGHT PORTABLE 12/3/2018  12:02 PM     HISTORY: Swelling post op.      FINDINGS: The deep veins in the right lower extremity are compressible  throughout.  The deep veins demonstrate normal venous augmentation,  waveforms and color Doppler flow. No evidence of superficial  thrombophlebitis.      Impression    IMPRESSION: No evidence of DVT.    MARTIN ORTEGA MD         ASSESSMENT / PLAN:     Physical deconditioning  Plan: PT/OT with increase independence, self-care, strength and endurance and other cares that help return to home/facility of origin, fall precautions. Care conference with patient and family for the progress of rehab and disposition issues will be discussed as planned. Rehab evaluation and other evaluations including CPT are at rehab logs, to be reviewed separately.  Fall risk assessment as well as cognitive evaluation will be formed during rehab stay if indicated.    Primary osteoarthritis of right hip Status post total replacement of right hip  Plan: PT/OT, pain medication, DVT prophylaxis with Xarelto as per orthopedic team. Follow up orthopedic clinic as scheduled. Staples removed as instructed. Incentive spirometry prn.    Anemia due to blood loss, acute  Plan: S/p blood transfusions. Continue to monitor CBC.    Paroxysmal atrial fibrillation (H)  Plan: Continue Xarelto and Coreg. Follow up cardiologist as scheduled.    Ischemic cardiomyopathy, Coronary artery disease involving native coronary artery of native heart without angina pectoris S/P CABG (coronary artery bypass graft)   Plan: continue current medications Coreg, ASA, monitor I&O and daily weighs and labs if indicated. Follow up cardiologist and PCP as scheduled.    Benign essential hypertension  Plan: Continue Coreg with parameters. Blood pressure stable.    Antiphospholipid antibody syndrome (H) and History of pulmonary embolism  Plan: Continue Xarelto    Status post  below knee amputation of left lower extremity (H)  Plan: Continue wound care at left stumb. Follow up orthopedic and PCP.    Other problems with same care. Primary care doctor and other specialists to address those chronic  problems in next clinic appointment to be scheduled upon discharge from the TCU.      Total time spent with patient visit was 36 min including patient visit, review of past records, patients counseling and coordinating care.      Electronically signed by:  Easton Cortez MD

## 2018-12-07 NOTE — LETTER
12/7/2018        RE: Antonio Ramirez  [address]  [place] [address]        PRIMARY CARE PROVIDER AND CLINIC RESPONSIBLE:  Main Hardy, [address] / [place] [address]        ADMISSION HISTORY AND PHYSICAL EXAMINATION     Chief Complaint   Patient presents with     Fatigue     Musculoskeletal Problem         HISTORY OF PRESENT ILLNESS:  73 year old male, (1945), admitted to the [facility] for continuation of medical care and rehab.  HPI information obtained from: facility chart records, facility staff, patient report and [facility] chart review.    Antonio Ramirez is a 73 year old male with history of pulmonary HTN, paroxysmal a fib, antiphospholipid antibody with DVT/PE currently anticoagulated on Xarelto, CAD s/p CABG 2007, ischemic cardiomypathy, HTN, hyperlipidemia, hx of large B-cell lymphoma, prostate cancer, alcoholic cirrhosis and previous left BKA and left TOBI who was admitted at [facility] from 11/27 to 12/5/18 due to elective right total hip arthroplasty on 11/27/18. EBL was 1200 ml. Post-op course was significant acute blood loss anemia with 3 units of blood transfusions. He had also an episode of syncope, acute renal failure. Treated with IV fluids. He had stump wound. He has mild pain at right hip. Slept poorly, appetite poor and no BM for several days. Patient denies chest pain, dyspnea, abdominal pain, n&v, fever, chills, dysuria. The rest of ROS is unremarkable. Patient is seen and examined by Oksana Najera NP. Please see IRENA Najera's admit noted dated 12/6/18 for details of admission, past medical history, family history, allergies, medication list, social history and other details pertinent with this admission. Hospital admission and dc summary reviewed.    Past Medical History:   Diagnosis Date     Alcoholism (H)      Amputated left leg (H)      Anemia      Anticardiolipin antibody syndrome (H)      Antiphospholipid antibody syndrome (H)       Antiplatelet or antithrombotic long-term use      Aortic root dilatation (H)      Aortic stenosis      Arthritis      Balance problem      Coronary artery disease     CABG 2007: SVG to LAD, SVG to diagonal, SVG to OM; cardiac cath 5/17/18: thrombectomy for restenosis of stents-sent for urgent CABG, cath 5/14/2007: GRECIA x2 to LAD, Grecia to diagonal     Gastro-oesophageal reflux disease      Heart attack (H) 2007    apparently arrested, cardiac X5     Hiatal hernia      Hypercholesterolemia      Hypertension      Ischemic cardiomyopathy      Left foot drop      Liver disease     alcoholic liver disease, alcoholic cirrohsosis, portal hypertension     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)     ?When diagnosed, patient not aware of this     Moderate mitral regurgitation      Monoclonal gammopathy      Neuropathy      Noninfectious ileitis     diverticulitis of colon     Nonrheumatic tricuspid valve regurgitation      Paroxysmal atrial fibrillation (H)      PE (pulmonary embolism) 2006    saddle emboli 2006     Prostate cancer (H) 2000    Treated with radiation (seed implants in 2000) -- no problems since     Pulmonary hypertension (H)      Renal disease     kidney stones     Syncope      Thrombocytopenia (H)      Urethral stricture      Venous thrombosis     IVC filter and lysis procedures 2007       Past Surgical History:   Procedure Laterality Date     AMPUTATE LEG BELOW KNEE Left 04/2014    related to gangrene, started as foot ulcer related to neuropathy     AMPUTATE LEG BELOW KNEE Left 12/4/2017    Procedure: AMPUTATE LEG BELOW KNEE;  Exploration of Left Leg Below Knee Amputation Stump with Ligation of Bleeders.;  Surgeon: Leandro Arreola MD;  Location:  OR     APPENDECTOMY       APPLY WOUND VAC Left 12/6/2017    Procedure: APPLY WOUND VAC;;  Surgeon: Saima Howard MD;  Location:  SD     ARTHROPLASTY HIP Right 11/27/2018    Procedure: RIGHT TOTAL HIP ARTHROPLASTY;  Surgeon: Saima Howard  MD;  Location:  OR     ARTHROPLASTY KNEE Right 9/19/2014    Procedure: ARTHROPLASTY KNEE;  Surgeon: Saima Howard MD;  Location:  OR     CARDIAC SURGERY      CABG     CHOLECYSTECTOMY       COLONOSCOPY       COMBINED CYSTOSCOPY, RETROGRADES, EXCHANGE STENT URETER(S) Left 7/24/2018    Procedure: COMBINED CYSTOSCOPY, RETROGRADES, EXCHANGE STENT URETER(S);  CYSTOSCOPY, BILATERAL RETROGRADES, BILATERAL URETERAL STENT EXCHANGE ;  Surgeon: Matt Cervantes MD;  Location:  OR     CRANIOTOMY Right 4/7/2018    Procedure: CRANIOTOMY;  Right Craniotomy For Subdural Hematoma;  Surgeon: Jono Issa MD;  Location:  OR     CYSTOSCOPY, DILATE URETHRA, COMBINED N/A 10/12/2016    Procedure: COMBINED CYSTOSCOPY, DILATE URETHRA;  Surgeon: Dimitry Bello MD;  Location:  OR     CYSTOSCOPY, REMOVE STENT(S), COMBINED Right 7/24/2018    Procedure: COMBINED CYSTOSCOPY, REMOVE STENT(S);;  Surgeon: Jose Hunter MD;  Location:  OR     ENDOSCOPIC STRIPPING VEIN(S)       esophagogastroduodenoscopy       EYE SURGERY      cataracts     GENITOURINARY SURGERY      Prostate Seen Implants     HERNIA REPAIR      Umbilical     INCISION AND DRAINAGE LOWER EXTREMITY, COMBINED Left 12/1/2017    Procedure: COMBINED INCISION AND DRAINAGE LOWER EXTREMITY;  INCISION AND DRAINAGE OF LEFT BELOW KNEE AMPUTATION ABSCESS, WOUND VAC PLACEMENT;  Surgeon: Saima Howard MD;  Location:  OR     IR IVC FILTER PLACEMENT       IRRIGATION AND DEBRIDEMENT LOWER EXTREMITY, COMBINED Left 12/6/2017    Procedure: COMBINED IRRIGATION AND DEBRIDEMENT LOWER EXTREMITY;  IRRIGATION AND DEBRIDEMENT OF LEFT BELOW KNEE AMPUTATION SITE WITH WOUND VAC REPLACEMENT;  Surgeon: Saima Howard MD;  Location:  [facility]     IRRIGATION AND DEBRIDEMENT LOWER EXTREMITY, COMBINED Left 12/8/2017    Procedure: COMBINED IRRIGATION AND DEBRIDEMENT LOWER EXTREMITY;  IRRIGATION AND DEBRIDEMENT OF LEFT BELOW THE KNEE AMPUTATION AND SHORTENING OF THE  TIBIA WITH WOUND CLOSURE;  Surgeon: Saima Howard MD;  Location:  OR     LASER HOLMIUM LITHOTRIPSY URETER(S), INSERT STENT, COMBINED Bilateral 6/28/2018    Procedure: COMBINED CYSTOSCOPY, URETEROSCOPY, LASER HOLMIUM LITHOTRIPSY URETER(S), INSERT STENT;  CYSTOSCOPY, BILATERAL URETEROSCOPY,  HOLMIUM LASER LITHOTRIPSY, BILATERAL STENT PLACEMENT, STONE BASKETING;  Surgeon: Jose Hunter MD;  Location:  OR     LASER HOLMIUM LITHOTRIPSY URETER(S), INSERT STENT, COMBINED Left 8/14/2018    Procedure: COMBINED CYSTOSCOPY, URETEROSCOPY, LASER HOLMIUM LITHOTRIPSY URETER(S), INSERT STENT;  CYSTOSCOPY, LEFT URETEROSCOPY, LEFT LASER HOLMIUM LITHOTRIPSY URETER(S) REMOVAL BILATERAL STENTS;  Surgeon: Jose Hunter MD;  Location:  OR     ORTHOPEDIC SURGERY      (R) knee scope     ORTHOPEDIC SURGERY      total hip      ORTHOPEDIC SURGERY      elbow surgery       Current Outpatient Prescriptions   Medication Sig     aspirin 81 MG tablet Take 81 mg by mouth daily     carvedilol (COREG) 3.125 MG tablet Take 1 tablet (3.125 mg) by mouth 2 times daily (with meals)     fluticasone (FLONASE) 50 MCG/ACT spray Spray 1 spray into both nostrils daily     GABAPENTIN PO Take 600 mg by mouth 2 times daily     HYDROmorphone (DILAUDID) 2 MG tablet Take 1 tablet (2 mg) by mouth every 4 hours as needed for breakthrough pain     hydrOXYzine (ATARAX) 10 MG tablet Take 1 tablet (10 mg) by mouth every 6 hours as needed for itching     lidocaine (LIDODERM) 5 % patch Place 2 patches onto the skin every 24 hours     nystatin (MYCOSTATIN) 782481 UNIT/GM external cream Apply topically 2 times daily     ondansetron (ZOFRAN-ODT) 4 MG ODT tab Take 1 tablet (4 mg) by mouth every 6 hours as needed for nausea or vomiting     polyethylene glycol (MIRALAX/GLYCOLAX) packet Take 17 g by mouth daily     Rivaroxaban (XARELTO PO) Take 20 mg by mouth daily     senna-docusate (SENOKOT-S/PERICOLACE) 8.6-50 MG tablet Take 1 tablet by mouth 2 times daily  "    TORSEMIDE PO Take 20 mg by mouth daily     No current facility-administered medications for this visit.        Allergies   Allergen Reactions     Ciprofloxacin Itching     Severe itching \"like ants were crawling\"     Hmg-Coa-R Inhibitors Other (See Comments)     Rhabdomyolysis occurred within a couple days       Social History     Social History     Marital status:      Spouse name: N/A     Number of children: 2     Years of education: N/A     Occupational History     Retired       Social History Main Topics     Smoking status: Never Smoker     Smokeless tobacco: Never Used     Alcohol use Yes      Comment: quit 10/2015; now 3-4 Vodkas/night     Drug use: No     Sexual activity: Not Currently     Other Topics Concern     Not on file     Social History Narrative    , 2 children, retired . He does not have a living will but desires full code.  (last updated 11/29/2017)           Information reviewed:  Medications, vital signs, orders, nursing notes, problem list, hospital information.     ROS: All 10 point review of system completed, those pertinent positive, please see H&P, the remaining ROS is negative.    /61  Pulse 62  Temp 99.4  F (37.4  C)  Resp 18  Ht 6' (1.829 m)  Wt 243 lb 12.8 oz (110.6 kg)  SpO2 95%  BMI 33.07 kg/m2    PHYSICAL EXAMINATION:   GENERAL:  No acute distress.  SKIN:  Dry and warm.  There is no rash at area of skin examined.  HEENT:  Head without trauma.  Pupils round, reactive. Exam of conjunctiva and lids are normal. Sclera without icterus. There is no oral thrush.  NECK:  Supple. No jugular venous distension.  CHEST: No reproducible chest tenderness.   LUNGS:  Normal respiratory effort. Lungs reveal decreased breath sounds at bases. No rales or wheezes.  HEART:  Regular rate and rhythm.  2/6 murmur, but no gallops or rubs auscultated.  ABDOMEN:  Soft, bowel sounds positive.  There is no tenderness or guarding.   EXTREMITIES: +1 edema right " leg and thigh. Right hip surgical site with dressing, mild tenderness. S/p left BKA with prosthesis.  NEUROLOGIC:  Alert and oriented x3. Moving all ext. Gait not tested.  PSYCH: Recent and remote memory is normal. Mood and affect are normal.    Lab/Diagnostic data:  Reviewed    CBC Lab Results (Last 5 results in 365 days)       WBC RBC Hgb Hct MCV MCH MCHC RDW Plt Neut # Lymph # Eos # Baso # Immat. Gran #   12/05/18 0729 -- -- -- -- -- -- -- -- 125 -- -- -- -- --   12/05/18 0729 -- -- 8.8 -- -- -- -- -- -- -- -- -- -- --   12/04/18 1714 -- -- 8.0 -- -- -- -- -- -- -- -- -- -- --   12/02/18 0652 6.2 2.59 8.1 23.9 92 31.3 33.9 16.6 112 -- -- -- -- --   12/01/18 0622 6.2 2.28 7.1 21.3 93 31.1 33.3 16.7 94 -- -- -- -- --   CMP Labs (Last 5 results in 365 days)       Na+ K+ Cl CO2 ANION GAP Glc BUN Creat GFR GFR-Black Calcium Mg ALT AST A1C TSH LDL HIV   12/04/18 0744 -- -- -- -- -- -- -- 1.23 57 69 -- -- -- -- -- -- -- --   12/03/18 0714 -- -- -- -- -- -- -- 1.29 54 66 -- -- -- -- -- -- -- --   12/02/18 0652 133 4.5 102 26 5 97 36 1.61 42 51 8.5 -- -- -- -- -- -- --   12/01/18 0622 132 4.7 102 26 4 102 35 2.09 31 38 8.5 -- -- -- -- -- -- --   11/30/18 1529 -- -- -- -- -- 122 -- -- -- -- -- -- -- -- -- -- -- --     Liver Function Studies -   Recent Labs   Lab Test  12/07/18   0700   PROTTOTAL  6.2*   ALBUMIN  2.6*   BILITOTAL  3.0*   ALKPHOS  72   AST  26   ALT  9       Results for orders placed or performed during the hospital encounter of 11/27/18   XR Pelvis Port 1/2 Views    Narrative    PELVIS ONE TO TWO VIEW RIGHT November 27, 2018 10:05 AM     HISTORY: Intraoperative pelvis film.    COMPARISON: None.      Impression    IMPRESSION: Right hip arthroplasty spacer in anatomic alignment. No  acute osseous abnormality demonstrated.    CARMEN SERRATO MD   US Lower Extremity Venous Duplex Right Port    Narrative    ULTRASOUND LOWER EXTREMITY VENOUS DUPLEX RIGHT PORTABLE 12/3/2018  12:02 PM     HISTORY: Swelling post op.       FINDINGS: The deep veins in the right lower extremity are compressible  throughout. The deep veins demonstrate normal venous augmentation,  waveforms and color Doppler flow. No evidence of superficial  thrombophlebitis.      Impression    IMPRESSION: No evidence of DVT.    MARTIN ORTEGA MD         ASSESSMENT / PLAN:     Physical deconditioning  Plan: PT/OT with increase independence, self-care, strength and endurance and other cares that help return to home/facility of origin, fall precautions. Care conference with patient and family for the progress of rehab and disposition issues will be discussed as planned. Rehab evaluation and other evaluations including CPT are at rehab logs, to be reviewed separately.  Fall risk assessment as well as cognitive evaluation will be formed during rehab stay if indicated.    Primary osteoarthritis of right hip Status post total replacement of right hip  Plan: PT/OT, pain medication, DVT prophylaxis with Xarelto as per orthopedic team. Follow up orthopedic clinic as scheduled. Staples removed as instructed. Incentive spirometry prn.    Anemia due to blood loss, acute  Plan: S/p blood transfusions. Continue to monitor CBC.    Paroxysmal atrial fibrillation (H)  Plan: Continue Xarelto and Coreg. Follow up cardiologist as scheduled.    Ischemic cardiomyopathy, Coronary artery disease involving native coronary artery of native heart without angina pectoris S/P CABG (coronary artery bypass graft)   Plan: continue current medications Coreg, ASA, monitor I&O and daily weighs and labs if indicated. Follow up cardiologist and PCP as scheduled.    Benign essential hypertension  Plan: Continue Coreg with parameters. Blood pressure stable.    Antiphospholipid antibody syndrome (H) and History of pulmonary embolism  Plan: Continue Xarelto    Status post  below knee amputation of left lower extremity (H)  Plan: Continue wound care at left stumb. Follow up orthopedic and PCP.    Other problems  with same care. Primary care doctor and other specialists to address those chronic problems in next clinic appointment to be scheduled upon discharge from the TCU.      Total time spent with patient visit was 36 min including patient visit, review of past records, patients counseling and coordinating care.      Electronically signed by:  Easton Cortez MD              Sincerely,        Easton Cortez MD

## 2018-12-09 ENCOUNTER — TELEPHONE (OUTPATIENT)
Dept: GERIATRICS | Facility: CLINIC | Age: 73
End: 2018-12-09

## 2018-12-09 DIAGNOSIS — N39.0 URINARY TRACT INFECTION WITHOUT HEMATURIA, SITE UNSPECIFIED: Primary | ICD-10-CM

## 2018-12-09 LAB
BACTERIA SPEC CULT: ABNORMAL
Lab: ABNORMAL
SPECIMEN SOURCE: ABNORMAL

## 2018-12-10 NOTE — TELEPHONE ENCOUNTER
[facility] TELEPHONE ENCOUNTER    Chief Complaint   Patient presents with     Lab Result Notice       Antonio Ramirez is a 73 year old  (1945),Nurse called today to report: UC results returned    ASSESSMENT/PLAN  Color Urine (no units)   Date Value   12/07/2018 Yellow     Appearance Urine (no units)   Date Value   12/07/2018 Clear     Glucose Urine (mg/dL)   Date Value   12/07/2018 Negative     Bilirubin Urine (no units)   Date Value   12/07/2018 Negative     Ketones Urine (mg/dL)   Date Value   12/07/2018 Negative     Specific Gravity Urine (no units)   Date Value   12/07/2018 1.010     pH Urine (pH)   Date Value   12/07/2018 5.0     Protein Albumin Urine (mg/dL)   Date Value   12/07/2018 Negative     Nitrite Urine (no units)   Date Value   12/07/2018 Negative     Leukocyte Esterase Urine (no units)   Date Value   12/07/2018 Small (A)   Component Results     Specimen Information: Midstream Urine        Component Collected Lab   Specimen Description 12/07/2018  1:21    Midstream Urine    Special Requests 12/07/2018  1:21 PM 73   Specimen received in preservative    Culture Micro (Abnormal) 12/07/2018  1:21    Abnormal   >100,000 colonies/mL   Enterococcus faecalis     Susceptibility     Enterococcus faecalis     Antibiotic Interpretation Sensitivity Method Status   AMPICILLIN Sensitive <=2 ug/mL GAYLE Final   NITROFURANTOIN Sensitive <=16 ug/mL GAYLE Final   PENICILLIN Sensitive 8 ug/mL GAYLE Final   VANCOMYCIN Sensitive 1 ug/mL GAYLE Final     PCN 250mg q6h x5d    F/u with primary NP    Jacy Mckeon, APRN CNP

## 2018-12-11 ENCOUNTER — HOSPITAL ENCOUNTER (OUTPATIENT)
Dept: LAB | Facility: CLINIC | Age: 73
End: 2018-12-11
Admitting: RADIOLOGY
Payer: MEDICARE

## 2018-12-11 ENCOUNTER — HOSPITAL ENCOUNTER (OUTPATIENT)
Facility: CLINIC | Age: 73
Discharge: NURSING, REHAB OR LONG-TERM CARE | End: 2018-12-11
Attending: RADIOLOGY | Admitting: RADIOLOGY
Payer: MEDICARE

## 2018-12-11 ENCOUNTER — NURSING HOME VISIT (OUTPATIENT)
Dept: GERIATRICS | Facility: CLINIC | Age: 73
End: 2018-12-11
Payer: MEDICARE

## 2018-12-11 VITALS
SYSTOLIC BLOOD PRESSURE: 109 MMHG | RESPIRATION RATE: 17 BRPM | DIASTOLIC BLOOD PRESSURE: 52 MMHG | HEART RATE: 73 BPM | HEIGHT: 72 IN | OXYGEN SATURATION: 97 % | TEMPERATURE: 98.8 F | BODY MASS INDEX: 31.69 KG/M2 | WEIGHT: 234 LBS

## 2018-12-11 VITALS
RESPIRATION RATE: 18 BRPM | DIASTOLIC BLOOD PRESSURE: 60 MMHG | SYSTOLIC BLOOD PRESSURE: 132 MMHG | TEMPERATURE: 97.1 F | OXYGEN SATURATION: 96 % | HEART RATE: 70 BPM

## 2018-12-11 DIAGNOSIS — Z96.641 STATUS POST TOTAL REPLACEMENT OF RIGHT HIP: Primary | ICD-10-CM

## 2018-12-11 DIAGNOSIS — M16.11 OSTEOARTHRITIS OF RIGHT HIP, UNSPECIFIED OSTEOARTHRITIS TYPE: ICD-10-CM

## 2018-12-11 DIAGNOSIS — K70.31 ALCOHOLIC CIRRHOSIS OF LIVER WITH ASCITES (H): ICD-10-CM

## 2018-12-11 DIAGNOSIS — I77.810 AORTIC ROOT DILATATION (H): ICD-10-CM

## 2018-12-11 DIAGNOSIS — I10 BENIGN ESSENTIAL HYPERTENSION: ICD-10-CM

## 2018-12-11 DIAGNOSIS — N30.00 ACUTE CYSTITIS WITHOUT HEMATURIA: ICD-10-CM

## 2018-12-11 DIAGNOSIS — I25.5 ISCHEMIC CARDIOMYOPATHY: ICD-10-CM

## 2018-12-11 DIAGNOSIS — D68.61 ANTIPHOSPHOLIPID ANTIBODY SYNDROME (H): ICD-10-CM

## 2018-12-11 DIAGNOSIS — R53.81 PHYSICAL DECONDITIONING: ICD-10-CM

## 2018-12-11 DIAGNOSIS — C83.00 MALIGNANT LYMPHOPLASMACYTIC LYMPHOMA (H): ICD-10-CM

## 2018-12-11 DIAGNOSIS — F10.10 ALCOHOL ABUSE: ICD-10-CM

## 2018-12-11 LAB
ALBUMIN SERPL-MCNC: 2.7 G/DL (ref 3.4–5)
ALP SERPL-CCNC: 95 U/L (ref 40–150)
ALT SERPL W P-5'-P-CCNC: 13 U/L (ref 0–70)
ANION GAP SERPL CALCULATED.3IONS-SCNC: 8 MMOL/L (ref 3–14)
AST SERPL W P-5'-P-CCNC: 38 U/L (ref 0–45)
BILIRUB SERPL-MCNC: 2.2 MG/DL (ref 0.2–1.3)
BUN SERPL-MCNC: 42 MG/DL (ref 7–30)
CALCIUM SERPL-MCNC: 8.6 MG/DL (ref 8.5–10.1)
CHLORIDE SERPL-SCNC: 101 MMOL/L (ref 94–109)
CO2 SERPL-SCNC: 28 MMOL/L (ref 20–32)
CREAT SERPL-MCNC: 1.42 MG/DL (ref 0.66–1.25)
GFR SERPL CREATININE-BSD FRML MDRD: 49 ML/MIN/1.7M2
GLUCOSE SERPL-MCNC: 109 MG/DL (ref 70–99)
INR PPP: 3.82 (ref 0.86–1.14)
POTASSIUM SERPL-SCNC: 4.3 MMOL/L (ref 3.4–5.3)
PROT SERPL-MCNC: 6.7 G/DL (ref 6.8–8.8)
SODIUM SERPL-SCNC: 137 MMOL/L (ref 133–144)

## 2018-12-11 PROCEDURE — 80053 COMPREHEN METABOLIC PANEL: CPT | Performed by: NURSE PRACTITIONER

## 2018-12-11 PROCEDURE — 85610 PROTHROMBIN TIME: CPT | Performed by: RADIOLOGY

## 2018-12-11 PROCEDURE — 49083 ABD PARACENTESIS W/IMAGING: CPT

## 2018-12-11 PROCEDURE — 36415 COLL VENOUS BLD VENIPUNCTURE: CPT | Performed by: RADIOLOGY

## 2018-12-11 PROCEDURE — 99310 SBSQ NF CARE HIGH MDM 45: CPT | Performed by: NURSE PRACTITIONER

## 2018-12-11 RX ORDER — PENICILLIN V POTASSIUM 250 MG/1
250 TABLET, FILM COATED ORAL EVERY 6 HOURS
COMMUNITY
End: 2018-12-18

## 2018-12-11 RX ORDER — DEXTROSE MONOHYDRATE 25 G/50ML
25-50 INJECTION, SOLUTION INTRAVENOUS
Status: DISCONTINUED | OUTPATIENT
Start: 2018-12-11 | End: 2018-12-11 | Stop reason: HOSPADM

## 2018-12-11 RX ORDER — NICOTINE POLACRILEX 4 MG
15-30 LOZENGE BUCCAL
Status: DISCONTINUED | OUTPATIENT
Start: 2018-12-11 | End: 2018-12-11 | Stop reason: HOSPADM

## 2018-12-11 RX ORDER — HYDROXYZINE HYDROCHLORIDE 10 MG/1
10 TABLET, FILM COATED ORAL EVERY 6 HOURS
COMMUNITY
End: 2018-12-20

## 2018-12-11 RX ORDER — HYDROMORPHONE HYDROCHLORIDE 2 MG/1
2 TABLET ORAL DAILY
COMMUNITY
End: 2018-12-26

## 2018-12-11 ASSESSMENT — MIFFLIN-ST. JEOR: SCORE: 1834.42

## 2018-12-11 NOTE — PROGRESS NOTES
"[facility]    Chief Complaint   Patient presents with     NEENA       [place] Medical Record Number:  [id number]  [facility] of [facility] where encounter took [facility] [[id number]]    HPI:    Antonio Ramirez is a 73 year old  (1945), who is being seen today for an episodic care visit.  HPI information obtained from: patient report, [facility] chart review and family/first contact Helena report.  Current issues are:    Patient is POD #14 today. Was working with PT and OT this morning when provider first attempted patient visit. Brief check-in with patient and wife after PT/OT and prior to scheduled paracentesis this afternoon at [facility]. Patient stated his pain was a \"0\" at that time. Provider acknowledged this was good news as he was just working with physical therapy and pain is still well-managed. Provider attempted shared decision-making to adjust pain medications (i.e. Taper Dilauadid to q4h PRN vs. Scheduled or Dialudid q6h scheduled) to which patient got very defensive stating \"I don't want anything changed, it's working well now.\" Provider explained that we always taper opioids as pain is better managed and healing is occurring to which patient stated, \"well then my pain is not well-managed\". Provider then inquired when last bowel movement was (big \"blow-out\" yesterday in the shower), which was the patients first bowel movement in over a week. Explained to patient that opioids also can cause bowel obstructions and that this is the other reason we try to taper then while managing pain control, but patient still stated, \"I don't want you changing anything\". Wife Helena was also present and acknowledged pain medications should be tapered. Otherwise, patient denies any complaints, working with therapy and drinking supplements although he stated, \"I don't know if they come three times a day, I haven't seen one this morning\".     PMH and summary of most " recent hospitalization below:         Status post total replacement of right hip  Osteoarthritis of right hip, unspecified osteoarthritis type  Physical deconditioning  Hx of left below-the-knee amputation  Patient underwent right total hip arthroplasty at [facility] (primary surgeon Dr. Howard) on 11/27/18 d/t hx of osteoarthritis of the right hip. EBL 1200 mL - required 3 units PRBCs inpatient (11/29, 12/1 & 12/5). Dose of Venofer given 12/5 for possible iron-deficiency anemia. Prophy antibiotics x 24-hours post-op. Pain moderately controlled with PO Dilaudid (had been receiving 4 mg doses when experienced Syncope inpatient; now down to 2 mg with reports of poor pain control). Episode of ANSELMO - creatinine up to 2.09, estimated GFR 31 - improved to creatinine of 1.23, estimated GFR 57 12/4/18.     To note, patient previously had a left total arthroplasty and left below-the-knee amputation approximately 5 years ago in [place]. 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 It was noticed during hospitalization that there was a fissure on the left stump and concern for proper wound care (i.e. malodorous, no sock between prosthesis & skin, etc.). Wound care orders from hospital in plan below.      Ischemic cardiomyopathy  Antiphospholipid antibody syndrome (H)  Paroxysmal atrial fibrillation (H)  Benign essential hypertension  Aortic Root Dilation  Echo 7/11/18 - EF 40-45% with anterior, septal & apical wall akinesis  Patient anticoagulated with chronic Xarelto d/t history of PE presumably related to his antiphospholipid antibody syndrome - held during immediately prior to and during surgery; restarted POD #1.   Coreg for chronic A-Fib.   Syncopal episode w/LOC during hospitalization (11/29/18 & 11/30/18) - etiology multifactorial (pain meds vs. vagal response vs. known aortic stenosis and aortic root dilation  vs. Pulmonary hypertension vs. ?); recovered with fluids and RBCs.   Restarted Torsemide 12/2/18; US 12/3 to r/o DVT - recommend compression stockings.     Alcohol abuse  Possible alcoholic cirrhosis of the liver w/ascites  Patient reports h/o alcohol abuse, stopping in 2015 but ETOH level from 10/31/18 was 0.10.   Possible alcoholic cirrhosis w/ascites although this was no mentioned in CT imaging; had a paracentesis 10/30/18, 3.3L removed     Malignant Lymphoplasmacytic Lymphoma  Diagnosed via BMBX - no evidence of progression in BMBX September 2018. Followed by Dr. Raymond at [facility]    ALLERGIES: Ciprofloxacin and Hmg-coa-r inhibitors  Past Medical, Surgical, Family and Social History reviewed and updated in [facility].    Current Outpatient Medications   Medication Sig Dispense Refill     aspirin 81 MG tablet Take 81 mg by mouth daily       carvedilol (COREG) 3.125 MG tablet Take 1 tablet (3.125 mg) by mouth 2 times daily (with meals) 180 tablet 1     fluticasone (FLONASE) 50 MCG/ACT spray Spray 1 spray into both nostrils daily       GABAPENTIN PO Take 600 mg by mouth 2 times daily       HYDROmorphone (DILAUDID) 2 MG tablet Take 2 mg by mouth every 4 hours       hydrOXYzine (ATARAX) 10 MG tablet Take 10 mg by mouth every 6 hours       lidocaine (LIDODERM) 5 % patch Place 2 patches onto the skin every 24 hours       nystatin (MYCOSTATIN) 123795 UNIT/GM external cream Apply topically 2 times daily       ondansetron (ZOFRAN-ODT) 4 MG ODT tab Take 1 tablet (4 mg) by mouth every 6 hours as needed for nausea or vomiting 30 tablet 0     penicillin V (VEETID) 250 MG tablet Take 250 mg by mouth every 6 hours For 5 days.       polyethylene glycol (MIRALAX/GLYCOLAX) packet Take 17 g by mouth daily       Rivaroxaban (XARELTO PO) Take 20 mg by mouth daily       senna-docusate (SENOKOT-S/PERICOLACE) 8.6-50 MG tablet Take 1 tablet by mouth 2 times daily 30 tablet 0     TORSEMIDE PO Take 20 mg by mouth daily       Medications  reviewed:  Medications reconciled to facility chart and changes were made to reflect current medications as identified as above med list. Below are the changes that were made:   Medications stopped since last EPIC medication reconciliation:   Medications Discontinued During This Encounter   Medication Reason     hydrOXYzine (ATARAX) 10 MG tablet      HYDROmorphone (DILAUDID) 2 MG tablet        Medications started since last [facility] medication reconciliation:  Orders Placed This Encounter   Medications     HYDROmorphone (DILAUDID) 2 MG tablet     Sig: Take 2 mg by mouth every 4 hours     hydrOXYzine (ATARAX) 10 MG tablet     Sig: Take 10 mg by mouth every 6 hours     penicillin V (VEETID) 250 MG tablet     Sig: Take 250 mg by mouth every 6 hours For 5 days.       REVIEW OF SYSTEMS:  4 point ROS including Respiratory, CV, GI and , other than that noted in the HPI,  is negative    Physical Exam:  /52   Pulse 73   Temp 98.8  F (37.1  C)   Resp 17   Ht 1.829 m (6')   Wt 106.1 kg (234 lb)   SpO2 97%   BMI 31.74 kg/m    GENERAL APPEARANCE:  Alert, in no distress  ENT:  Mouth and posterior oropharynx normal, moist mucous membranes, hearing acuity intact  EYES:  EOM, conjunctivae, lids, pupils and irises normal  NECK:  No adenopathy,masses or thyromegaly  RESP:  respiratory effort normal, no respiratory distress, Lung sounds clear throughout  CV:  rate and rhythm irregularly irregular rhythm (chronic a-fib), Grade 3 systolic murmur best heard over the RUSB, but present throughout, no rub or gallop, Edema 2+ in the right foot and 2+ in the entire right leg. Lymphedema wrap in place.   ABDOMEN:  normal bowel sounds, soft, nontender, no hepatosplenomegaly or other masses; distended r/t ascites  M/S:   Gait and station not observed, Digits and nails with mild thickening, no clubbing  SKIN: extensive bruising around the right hip, non-blanchable, incision with scant dried bloody drainage and sanguinous drainage on  bandages, no expanding erythema or edema around incision. Left stump with approximately 4 cm x 2 cm crusting fissure - no expanding erythema or edema, mild ecchymosis near stump, blanchable.   NEURO: 2-12 in normal limits and at patient's baseline  PSYCH:  Insight, judgement, and memory intact, affect and mood normal    Recent Labs:  CBC RESULTS:   Recent Labs   Lab Test 12/07/18  0700 12/05/18  0729  12/02/18  0652   WBC 7.4  --   --  6.2   RBC 2.93*  --   --  2.59*   HGB 9.0* 8.8*   < > 8.1*   HCT 26.6*  --   --  23.9*   MCV 91  --   --  92   MCH 30.7  --   --  31.3   MCHC 33.8  --   --  33.9   RDW 16.5*  --   --  16.6*    125*  --  112*    < > = values in this interval not displayed.       Last Basic Metabolic Panel:  Recent Labs   Lab Test 12/07/18  0700 12/04/18  0744  12/02/18  0652     --   --  133   POTASSIUM 3.8  --   --  4.5   CHLORIDE 101  --   --  102   BENNIE 8.8  --   --  8.5   CO2 28  --   --  26   BUN 36*  --   --  36*   CR 1.23 1.23   < > 1.61*   GLC 81  --   --  97    < > = values in this interval not displayed.       Liver Function Studies -   Recent Labs   Lab Test 12/07/18  0700 11/29/18  0714   PROTTOTAL 6.2* 6.4*   ALBUMIN 2.6* 3.0*   BILITOTAL 3.0* 0.8   ALKPHOS 72 65   AST 26 25   ALT 9 10       TSH   Date Value Ref Range Status   10/31/2018 0.86 0.40 - 4.00 mU/L Final   10/31/2018 Canceled, Test credited, specimen discarded 0.40 - 4.00 mU/L Final     Comment:     Unsatisfactory specimen - hemolyzed     Lab Results   Component Value Date    A1C 4.6 05/18/2018         Assessment/Plan:  (Z96.641) Status post total replacement of right hip  (primary encounter diagnosis)  (M16.11) Osteoarthritis of right hip, unspecified osteoarthritis type  (R53.81) Physical deconditioning  (K59.00) Constipation  Comment: POD #14 s/p right TKA. Baseline creatinine 0.92 - 1.20 (estimated GFR 59 11/8/18 c/w stage III CKD), CMP 12/7 with stable creatinine at 1.23. Big blow-out (but not loose per patient)  BM yesterday (first in over a week); still passing flatus, denies abdominal pain. Patient states pain is a 0, but is very reluctant to tapering of opioids (see narrative above). Working well with PT and OT.   Plan: Taper Dilaudid to q6h and Hydroxyzine q6h PRN; Lidocaine patches as scheduled. Ok to remove and replace outer dressing; DO NO touch prineo (mesh) inner dressing. Educated patient on the importance of ice post-op to help with inflammation. Avoid NSAIDs and Acetaminophen with kidney and liver impairment. Continue Miralax w/Senna BID for bowel function; escalate if needed. Aggressive PT & OT as tolerated; recommend Vanilla/Boost ensures TID for nutrition (patient prefers Ensures and drinks these at home). Need to schedule follow-up with ortho/Dr. Howard as patient missed appointment yesterday (wife wasn't aware he needed transportation for it). Will ask KINGS Cardona to do follow up.   12/12 two view right hip arthroplasty    (N30.00) Acute cystitis without hematuria  Comment: UA w/10 WBCs, small leukocytes - cx positive for >100,000 colonies/mL of enterococcus faecalis. Afebrile since; asymptomatic.   Plan: Penicillin 250 mg QID x 5 days (last dose 12/14/18).     (Z89.512) History of left below knee amputation (H)  Comment: 4 cm x 2 cm fissure with brown crusting, no expanding erythema, edema or increased pain.   Plan: Plan of care for left stump: 2x/ day   1. Clean with gentle soap and water, dry and dry again.  Make sure is dry before putting on prosthetic..  2. Apply lotion down to the very distal aspect of the stump but not on the very bottom/ distal aspect- want that to become dry and callused  3. Apply very small dab of Vaseline just where the fissure is.  Goal is to keep that just a bit moist so it can heal but not moisten the surround callus  4. Cover distal stump with one, dry 4x4 then a 4x4 that is opened and conformed to the stump  5. Wear a washable sleeve  6. Keep rubbery prosthetic clean    (I25.5)  Ischemic cardiomyopathy  Comment: EF 35-40%  Plan: Torsemide 20 mg daily - monitor RLE edema and HF. Follow-up with cardiologist Dr. Downs 12/19 at 10:45 am.    (D68.61) Antiphospholipid antibody syndrome (H)  Comment: Hx of PE; on chronic anticoagulant.   Plan: Continue Xarelto and baby aspirin.     (I10) Benign essential hypertension  (I48.0) Paroxysmal A-fib  (I77.810) Aortic root dilatation (H)  Comment: BPs 100's-120's/50's-70's; HR 50's-70's  Plan: Continue Coreg.     (F10.10) Alcohol abuse  (K70.31) Alcoholic cirrhosis of liver with ascites (H)  Comment: no signs of alcohol withdrawal; patient's abdomen is taught r/t fluid/ascites. Used to do paracentesis every 2 weeks in [place]; states not as frequently here has he needs new orders every time. Last paracentesis 10/31/18. Bilirubin 3.0 on 12/7/18 (baseline in hospital < 1.0); asymptomatic aside from jaundice.   Plan: Paracentesis delayed today as INR 3.82. Re-check CMP 12/11 (drawn at [facility]) with direct bilirubin and consider RUQ U/S for liver assessment if worsening/symptomatic.     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   Plan: last seen 10/25/18 - follow-up in 6 months (April 2019).    Orders:  CMP & direct bili, ammonia added on at [facility]  Taper Dilaudid to 2 mg q6h  Atarax available PRN vs. Scheduled    The above evaluation was completed by DIPAK Cantu and transcribed by GISSEL Arreguin student.        Electronically signed by  DIPAK Huggins CNP

## 2018-12-11 NOTE — LETTER
"    12/11/2018        RE: Antonio Ramirez  [address]  [place] [address]        [facility]    Chief Complaint   Patient presents with     NEENA       [facility] Medical Record Number:  [id number]  Place of Service where encounter took place:  [facility] [[id number]]    HPI:    Antonio Ramirez is a 73 year old  (1945), who is being seen today for an episodic care visit.  HPI information obtained from: patient report, [facility] Epic chart review and family/first contact Helena report.  Current issues are:    Patient is POD #14 today. Was working with PT and OT this morning when provider first attempted patient visit. Brief check-in with patient and wife after PT/OT and prior to scheduled paracentesis this afternoon at [facility]. Patient stated his pain was a \"0\" at that time. Provider acknowledged this was good news as he was just working with physical therapy and pain is still well-managed. Provider attempted shared decision-making to adjust pain medications (i.e. Taper Dilauadid to q4h PRN vs. Scheduled or Dialudid q6h scheduled) to which patient got very defensive stating \"I don't want anything changed, it's working well now.\" Provider explained that we always taper opioids as pain is better managed and healing is occurring to which patient stated, \"well then my pain is not well-managed\". Provider then inquired when last bowel movement was (big \"blow-out\" yesterday in the shower), which was the patients first bowel movement in over a week. Explained to patient that opioids also can cause bowel obstructions and that this is the other reason we try to taper then while managing pain control, but patient still stated, \"I don't want you changing anything\". Wife Helena was also present and acknowledged pain medications should be tapered. Otherwise, patient denies any complaints, working with therapy and drinking supplements although he stated, \"I don't " "know if they come three times a day, I haven't seen one this morning\".     PMH and summary of most recent hospitalization below:         Status post total replacement of right hip  Osteoarthritis of right hip, unspecified osteoarthritis type  Physical deconditioning  Hx of left below-the-knee amputation  Patient underwent right total hip arthroplasty at [facility] (primary surgeon Dr. Howard) on 11/27/18 d/t hx of osteoarthritis of the right hip. EBL 1200 mL - required 3 units PRBCs inpatient (11/29, 12/1 & 12/5). Dose of Venofer given 12/5 for possible iron-deficiency anemia. Prophy antibiotics x 24-hours post-op. Pain moderately controlled with PO Dilaudid (had been receiving 4 mg doses when experienced Syncope inpatient; now down to 2 mg with reports of poor pain control). Episode of ANSELMO - creatinine up to 2.09, estimated GFR 31 - improved to creatinine of 1.23, estimated GFR 57 12/4/18.     To note, patient previously had a left total arthroplasty and left below-the-knee amputation approximately 5 years ago in [place]. 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 It was noticed during hospitalization that there was a fissure on the left stump and concern for proper wound care (i.e. malodorous, no sock between prosthesis & skin, etc.). Wound care orders from hospital in plan below.      Ischemic cardiomyopathy  Antiphospholipid antibody syndrome (H)  Paroxysmal atrial fibrillation (H)  Benign essential hypertension  Aortic Root Dilation  Echo 7/11/18 - EF 40-45% with anterior, septal & apical wall akinesis  Patient anticoagulated with chronic Xarelto d/t history of PE presumably related to his antiphospholipid antibody syndrome - held during immediately prior to and during surgery; restarted POD #1.   Coreg for chronic A-Fib.   Syncopal episode w/LOC during hospitalization (11/29/18 & 11/30/18) - " etiology multifactorial (pain meds vs. vagal response vs. known aortic stenosis and aortic root dilation vs. Pulmonary hypertension vs. ?); recovered with fluids and RBCs.   Restarted Torsemide 12/2/18; US 12/3 to r/o DVT - recommend compression stockings.     Alcohol abuse  Possible alcoholic cirrhosis of the liver w/ascites  Patient reports h/o alcohol abuse, stopping in 2015 but ETOH level from 10/31/18 was 0.10.   Possible alcoholic cirrhosis w/ascites although this was no mentioned in CT imaging; had a paracentesis 10/30/18, 3.3L removed     Malignant Lymphoplasmacytic Lymphoma  Diagnosed via BMBX - no evidence of progression in BMBX September 2018. Followed by Dr. Raymond at [facility]    ALLERGIES: Ciprofloxacin and Hmg-coa-r inhibitors  Past Medical, Surgical, Family and Social History reviewed and updated in [facility].    Current Outpatient Medications   Medication Sig Dispense Refill     aspirin 81 MG tablet Take 81 mg by mouth daily       carvedilol (COREG) 3.125 MG tablet Take 1 tablet (3.125 mg) by mouth 2 times daily (with meals) 180 tablet 1     fluticasone (FLONASE) 50 MCG/ACT spray Spray 1 spray into both nostrils daily       GABAPENTIN PO Take 600 mg by mouth 2 times daily       HYDROmorphone (DILAUDID) 2 MG tablet Take 2 mg by mouth every 4 hours       hydrOXYzine (ATARAX) 10 MG tablet Take 10 mg by mouth every 6 hours       lidocaine (LIDODERM) 5 % patch Place 2 patches onto the skin every 24 hours       nystatin (MYCOSTATIN) 756856 UNIT/GM external cream Apply topically 2 times daily       ondansetron (ZOFRAN-ODT) 4 MG ODT tab Take 1 tablet (4 mg) by mouth every 6 hours as needed for nausea or vomiting 30 tablet 0     penicillin V (VEETID) 250 MG tablet Take 250 mg by mouth every 6 hours For 5 days.       polyethylene glycol (MIRALAX/GLYCOLAX) packet Take 17 g by mouth daily       Rivaroxaban (XARELTO PO) Take 20 mg by mouth daily       senna-docusate (SENOKOT-S/PERICOLACE) 8.6-50 MG tablet Take 1  tablet by mouth 2 times daily 30 tablet 0     TORSEMIDE PO Take 20 mg by mouth daily       Medications reviewed:  Medications reconciled to facility chart and changes were made to reflect current medications as identified as above med list. Below are the changes that were made:   Medications stopped since last EPIC medication reconciliation:   Medications Discontinued During This Encounter   Medication Reason     hydrOXYzine (ATARAX) 10 MG tablet      HYDROmorphone (DILAUDID) 2 MG tablet        Medications started since last [facility] medication reconciliation:  Orders Placed This Encounter   Medications     HYDROmorphone (DILAUDID) 2 MG tablet     Sig: Take 2 mg by mouth every 4 hours     hydrOXYzine (ATARAX) 10 MG tablet     Sig: Take 10 mg by mouth every 6 hours     penicillin V (VEETID) 250 MG tablet     Sig: Take 250 mg by mouth every 6 hours For 5 days.       REVIEW OF SYSTEMS:  4 point ROS including Respiratory, CV, GI and , other than that noted in the HPI,  is negative    Physical Exam:  /52   Pulse 73   Temp 98.8  F (37.1  C)   Resp 17   Ht 1.829 m (6')   Wt 106.1 kg (234 lb)   SpO2 97%   BMI 31.74 kg/m     GENERAL APPEARANCE:  Alert, in no distress  ENT:  Mouth and posterior oropharynx normal, moist mucous membranes, hearing acuity intact  EYES:  EOM, conjunctivae, lids, pupils and irises normal  NECK:  No adenopathy,masses or thyromegaly  RESP:  respiratory effort normal, no respiratory distress, Lung sounds clear throughout  CV:  rate and rhythm irregularly irregular rhythm (chronic a-fib), Grade 3 systolic murmur best heard over the RUSB, but present throughout, no rub or gallop, Edema 2+ in the right foot and 2+ in the entire right leg. Lymphedema wrap in place.   ABDOMEN:  normal bowel sounds, soft, nontender, no hepatosplenomegaly or other masses; distended r/t ascites  M/S:   Gait and station not observed, Digits and nails with mild thickening, no clubbing  SKIN: extensive bruising around  the right hip, non-blanchable, incision with scant dried bloody drainage and sanguinous drainage on bandages, no expanding erythema or edema around incision. Left stump with approximately 4 cm x 2 cm crusting fissure - no expanding erythema or edema, mild ecchymosis near stump, blanchable.   NEURO: 2-12 in normal limits and at patient's baseline  PSYCH:  Insight, judgement, and memory intact, affect and mood normal    Recent Labs:  CBC RESULTS:   Recent Labs   Lab Test 12/07/18  0700 12/05/18  0729  12/02/18  0652   WBC 7.4  --   --  6.2   RBC 2.93*  --   --  2.59*   HGB 9.0* 8.8*   < > 8.1*   HCT 26.6*  --   --  23.9*   MCV 91  --   --  92   MCH 30.7  --   --  31.3   MCHC 33.8  --   --  33.9   RDW 16.5*  --   --  16.6*    125*  --  112*    < > = values in this interval not displayed.       Last Basic Metabolic Panel:  Recent Labs   Lab Test 12/07/18  0700 12/04/18  0744  12/02/18  0652     --   --  133   POTASSIUM 3.8  --   --  4.5   CHLORIDE 101  --   --  102   BENNIE 8.8  --   --  8.5   CO2 28  --   --  26   BUN 36*  --   --  36*   CR 1.23 1.23   < > 1.61*   GLC 81  --   --  97    < > = values in this interval not displayed.       Liver Function Studies -   Recent Labs   Lab Test 12/07/18  0700 11/29/18  0714   PROTTOTAL 6.2* 6.4*   ALBUMIN 2.6* 3.0*   BILITOTAL 3.0* 0.8   ALKPHOS 72 65   AST 26 25   ALT 9 10       TSH   Date Value Ref Range Status   10/31/2018 0.86 0.40 - 4.00 mU/L Final   10/31/2018 Canceled, Test credited, specimen discarded 0.40 - 4.00 mU/L Final     Comment:     Unsatisfactory specimen - hemolyzed     Lab Results   Component Value Date    A1C 4.6 05/18/2018         Assessment/Plan:  (Z96.641) Status post total replacement of right hip  (primary encounter diagnosis)  (M16.11) Osteoarthritis of right hip, unspecified osteoarthritis type  (R53.81) Physical deconditioning  (K59.00) Constipation  Comment: POD #14 s/p right TKA. Baseline creatinine 0.92 - 1.20 (estimated GFR 59 11/8/18  c/w stage III CKD), CMP 12/7 with stable creatinine at 1.23. Big blow-out (but not loose per patient) BM yesterday (first in over a week); still passing flatus, denies abdominal pain.  Patient states pain is a 0, but is very reluctant to tapering of opioids (see narrative above). Working well with PT and OT.   Plan: Taper Dilaudid to q6h and Hydroxyzine q6h PRN; Lidocaine patches as scheduled. Ok to remove and replace outer dressing; DO NO touch prineo (mesh) inner dressing. Educated patient on the importance of ice post-op to help with inflammation. Avoid NSAIDs and Acetaminophen with kidney and liver impairment. Continue Miralax w/Senna BID for bowel function; escalate if needed. Aggressive PT & OT as tolerated; recommend Vanilla/Boost ensures TID for nutrition (patient prefers Ensures and drinks these at home). Need to schedule follow-up with ortho/Dr. Howard as patient missed appointment yesterday (wife wasn't aware he needed transportation for it). Will ask [facility] to do follow up.   12/12 two view right hip arthroplasty    (N30.00) Acute cystitis without hematuria  Comment: UA w/10 WBCs, small leukocytes - cx positive for >100,000 colonies/mL of enterococcus faecalis. Afebrile since; asymptomatic.   Plan: Penicillin 250 mg QID x 5 days (last dose 12/14/18).     (Z89.512) History of left below knee amputation (H)  Comment: 4 cm x 2 cm fissure with brown crusting, no expanding erythema, edema or increased pain.   Plan: Plan of care for left stump: 2x/ day   1. Clean with gentle soap and water, dry and dry again.  Make sure is dry before putting on prosthetic..  2. Apply lotion down to the very distal aspect of the stump but not on the very bottom/ distal aspect- want that to become dry and callused  3. Apply very small dab of Vaseline just where the fissure is.  Goal is to keep that just a bit moist so it can heal but not moisten the surround callus  4. Cover distal stump with one, dry 4x4 then a 4x4 that is  opened and conformed to the stump  5. Wear a washable sleeve  6. Keep rubbery prosthetic clean    (I25.5) Ischemic cardiomyopathy  Comment: EF 35-40%  Plan: Torsemide 20 mg daily - monitor RLE edema and HF. Follow-up with cardiologist Dr. Downs 12/19 at 10:45 am.    (D68.61) Antiphospholipid antibody syndrome (H)  Comment: Hx of PE; on chronic anticoagulant.   Plan: Continue Xarelto and baby aspirin.     (I10) Benign essential hypertension  (I48.0) Paroxysmal A-fib  (I77.810) Aortic root dilatation (H)  Comment: BPs 100's-120's/50's-70's; HR 50's-70's  Plan: Continue Coreg.     (F10.10) Alcohol abuse  (K70.31) Alcoholic cirrhosis of liver with ascites (H)  Comment: no signs of alcohol withdrawal; patient's abdomen is taught r/t fluid/ascites. Used to do paracentesis every 2 weeks in [place]; states not as frequently here has he needs new orders every time. Last paracentesis 10/31/18.  Bilirubin 3.0 on 12/7/18 (baseline in hospital < 1.0); asymptomatic aside from jaundice.   Plan:  Paracentesis delayed today as INR 3.82. Re-check CMP 12/11 (drawn at [facility]) with direct bilirubin and consider RUQ U/S for liver assessment if worsening/symptomatic.     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   Plan: last seen 10/25/18 - follow-up in 6 months (April 2019).    Orders:  CMP & direct bili, ammonia added on at  [facility]  Taper Dilaudid to 2 mg q6h  Atarax available PRN vs. Scheduled    The above evaluation was completed by DIPAK Cantu and transcribed by GISSEL Arreguin student.        Electronically signed by  DIPAK Huggins CNP                      Sincerely,        DIPAK Huggins CNP

## 2018-12-11 NOTE — PROGRESS NOTES
Admitted to care suites for scheduled ultrasound guided paracentesis. Patient is currently staying at [facility] following hip surgery. A phone call to [facility] confirmed that patient was given his xeralto at 0930 this morning. This information was relayed to Dr. Javier. OK to proceed. Discharge instructions reviewed by Rekha ROBINS. INR pending.

## 2018-12-11 NOTE — DISCHARGE INSTRUCTIONS

## 2018-12-11 NOTE — IP AVS SNAPSHOT
MRN:[id number]                      After Visit Summary   12/11/2018    Antonio Ramirez    MRN: [id number]           Visit Information        Department      12/11/2018 12:33 PM [facility]          Review of your medicines      UNREVIEWED medicines. Ask your doctor about these medicines       Dose / Directions   aspirin 81 MG tablet  Commonly known as:  ASA      Dose:  81 mg  Take 81 mg by mouth daily  Refills:  0     carvedilol 3.125 MG tablet  Commonly known as:  COREG      Dose:  3.125 mg  Take 1 tablet (3.125 mg) by mouth 2 times daily (with meals)  Quantity:  180 tablet  Refills:  1     fluticasone 50 MCG/ACT nasal spray  Commonly known as:  FLONASE      Dose:  1 spray  Spray 1 spray into both nostrils daily  Refills:  0     GABAPENTIN PO      Dose:  600 mg  Take 600 mg by mouth 2 times daily  Refills:  0     HYDROmorphone 2 MG tablet  Commonly known as:  DILAUDID      Dose:  2 mg  Take 2 mg by mouth every 4 hours  Refills:  0     hydrOXYzine 10 MG tablet  Commonly known as:  ATARAX      Dose:  10 mg  Take 10 mg by mouth every 6 hours  Refills:  0     lidocaine 5 % patch  Commonly known as:  LIDODERM      Dose:  2 patch  Place 2 patches onto the skin every 24 hours  Refills:  0     nystatin [id number] UNIT/GM external cream  Commonly known as:  MYCOSTATIN      Apply topically 2 times daily  Refills:  0     ondansetron 4 MG ODT tab  Commonly known as:  ZOFRAN-ODT  Used for:  Status post total replacement of right hip      Dose:  4 mg  Take 1 tablet (4 mg) by mouth every 6 hours as needed for nausea or vomiting  Quantity:  30 tablet  Refills:  0     penicillin V 250 MG tablet  Commonly known as:  VEETID      Dose:  250 mg  Take 250 mg by mouth every 6 hours For 5 days.  Refills:  0     polyethylene glycol packet  Commonly known as:  MIRALAX/GLYCOLAX      Dose:  17 g  Take 17 g by mouth daily  Refills:  0     senna-docusate 8.6-50 MG tablet  Commonly known as:   SENOKOT-S/PERICOLACE  Used for:  Status post total replacement of right hip      Dose:  1 tablet  Take 1 tablet by mouth 2 times daily  Quantity:  30 tablet  Refills:  0     TORSEMIDE PO      Dose:  20 mg  Take 20 mg by mouth daily  Refills:  0     XARELTO PO      Dose:  20 mg  Take 20 mg by mouth daily  Refills:  0              Protect others around you: Learn how to safely use, store and throw away your medicines at www.disposemymeds.org.       Follow-ups after your visit       Your next 10 appointments already scheduled    Dec 11, 2018  2:00 PM CST  US PARACENTESIS with BRYANUS4,  IMAGING NURSE,  BODY RAD,  BODY RAD  [facility]ound ([facility]) [address] [address] [phone]   How do I prepare for my exam? (Food and drink instructions) No Food and Drink Restrictions.  How do I prepare for my exam? (Other instructions) IF YOUR DOCTOR ALSO PRESCRIBED SEDATION DURING THE EXAM (medicine to help you relax): You will receive separate instructions about driving, eating, and additional tests that may be necessary prior to your exam day.  What should I wear: Wear comfortable clothes.  How long does the exam take: Aspiration (no sedation treatment takes about an hour.  For paracentesis, thoracentesis or sedation plan to spend at least three hours at the hospital.  What should I bring: Bring a list of your medicines, including vitamins, minerals and over-the-counter drugs. It is safest to leave personal items at home.  Do I need a :  No  is needed.  What do I need to tell my doctor: Tell your doctor in advance: * If you are or may be pregnant. * If you are taking Coumadin (or any other blood thinners) 5 days prior to the exam for any special instructions. * If you are diabetic to determine if your insulin needs have to be adjusted for the exam.  What should I do after the exam: Take it easy the rest of the day. You can return to normal activities  the next day.  What is this test: This test uses a long, thin needle or tube to remove tissue, fluid, or other cells from your body. Pictures from an ultrasound will guide the needle to the right place. (Ultrasound uses sound waves to create pictures of the body on a video screen. You will not feel the sound waves.)  * A biopsy removes tissue or other cells from the body; we send the tissue or cells to a lab for testing. * Paracentesis removes fluid from the belly (abdomen) to relieve pressure, to test the fluid or both. * Thoracentesis removes fluid from the sac around the lungs to relieve pressure, to test the fluid or both. * Aspiration removes fluid from any part of the body, then the fluid is tested for disease or infection.  Who should I call with questions: If you have any questions, please call the Imaging Department where you will have your exam. Directions, parking instructions, and other information is available on our website, [email or web address].   Dec 19, 2018 10:45 AM CST  Return Visit with Elena Downs MD  [facility] ([facility]) [address] [place] [id number] [phone] OPT 2      Care Instructions       Further instructions from your care team       Paracentesis Discharge Instructions     After you go home:      You may resume your normal diet.    Care of Puncture Site:      For the first 48 hrs, check your puncture site every couple hours while you are awake     If there is a bandaid - you may remove it tomorrow morning    You may shower tomorrow    No tub baths, whirlpools or swimming until your puncture site has fully healed    Fluid may leak from the site. Change the bandaid as needed - keep the site dry    If the fluid leaks for more than 48 hours, call your ordering provider     Activity:      You may go back to normal activity in 24 hours     Wait 48 hours before lifting, straining, exercise or other strenuous  activity    Medicines:      You may resume all your medications    For minor pain, you may take Acetaminophen (Tylenol) or Ibuprofen (Advil)            Call the provider who ordered this procedure if:      The site is red, swollen, hot or tender    Blood or fluid is draining from the site    Chills or a fever greater than 101 F (38 C)    Pain that is getting worse    Leaking from the site that does not stop    Any questions or concerns      If you have questions call:        [facility] Radiology Dept @ [phone]      The provider who performed your procedure was _________________.    Additional Information About Your Visit       MyChart Information    [employer] gives you secure access to your electronic health record. If you see a primary care provider, you can also send messages to your care team and make appointments. If you have questions, please call your primary care clinic.  If you do not have a primary care provider, please call [phone] and they will assist you.       Care EveryWhere ID    This is your Care EveryWhere ID. This could be used by other organizations to access your [facility] medical records  [id number]        Primary Care Provider Office Phone # Fax #    [facility] [phone]      Equal Access to Services    ALYSA DORANTES : Evelin buio Deborah, waaxda keithadaha, qaybta kaalmada adelalitha, igor mix. So [facility] [phone].    ATENCIÓN: Si habla español, tiene a shrestha disposición servicios gratuitos de asistencia lingüística. Llame al [phone].    We comply with applicable federal civil rights laws and [place] laws. We do not discriminate on the basis of race, color, national origin, age, disability, sex, sexual orientation, or gender identity.           Thank you!    Thank you for choosing [facility] for your care. Our goal is always to provide you with excellent care. Hearing back from our patients is one way we can  continue to improve our services. Please take a few minutes to complete the written survey that you may receive in the mail after you visit with us. Thank you!            Medication List      ASK your doctor about these medications          Morning Afternoon Evening Bedtime As Needed    aspirin 81 MG tablet  Commonly known as:  ASA  Take 81 mg by mouth daily                     carvedilol 3.125 MG tablet  Commonly known as:  COREG  Take 1 tablet (3.125 mg) by mouth 2 times daily (with meals)                     fluticasone 50 MCG/ACT nasal spray  Commonly known as:  FLONASE  Spray 1 spray into both nostrils daily                     GABAPENTIN PO  Take 600 mg by mouth 2 times daily                     HYDROmorphone 2 MG tablet  Commonly known as:  DILAUDID  Take 2 mg by mouth every 4 hours                     hydrOXYzine 10 MG tablet  Commonly known as:  ATARAX  Take 10 mg by mouth every 6 hours                     lidocaine 5 % patch  Commonly known as:  LIDODERM  Place 2 patches onto the skin every 24 hours                     nystatin [id number] UNIT/GM external cream  Commonly known as:  MYCOSTATIN  Apply topically 2 times daily                     ondansetron 4 MG ODT tab  Commonly known as:  ZOFRAN-ODT  Take 1 tablet (4 mg) by mouth every 6 hours as needed for nausea or vomiting                     penicillin V 250 MG tablet  Commonly known as:  VEETID  Take 250 mg by mouth every 6 hours For 5 days.                     polyethylene glycol packet  Commonly known as:  MIRALAX/GLYCOLAX  Take 17 g by mouth daily                     senna-docusate 8.6-50 MG tablet  Commonly known as:  SENOKOT-S/PERICOLACE  Take 1 tablet by mouth 2 times daily                     TORSEMIDE PO  Take 20 mg by mouth daily                     XARELTO PO  Take 20 mg by mouth daily

## 2018-12-11 NOTE — IP AVS SNAPSHOT
[place] [address] [place] [address]  Phone:  [phone]                                    After Visit Summary   12/11/2018    Antonio Ramirez    MRN: [id number]           After Visit Summary Signature Page    I have received my discharge instructions, and my questions have been answered. I have discussed any challenges I see with this plan with the nurse or doctor.    ..........................................................................................................................................  Patient/Patient Representative Signature      ..........................................................................................................................................  Patient Representative Print Name and Relationship to Patient    ..................................................               ................................................  Date                                   Time    ..........................................................................................................................................  Reviewed by Signature/Title    ...................................................              ..............................................  Date                                               Time          22EPIC Rev 08/18

## 2018-12-11 NOTE — PROGRESS NOTES
INR 3.82 Talked with Dr Dye. Will cancel procedure. This RN will call and talk with Krista nurse. Pt needs to follow up with Primary MD per Dr Dye. Pt discharged with wife back to TCU.

## 2018-12-12 ENCOUNTER — NURSING HOME VISIT (OUTPATIENT)
Dept: GERIATRICS | Facility: CLINIC | Age: 73
End: 2018-12-12

## 2018-12-12 ENCOUNTER — NURSING HOME VISIT (OUTPATIENT)
Dept: GERIATRICS | Facility: CLINIC | Age: 73
End: 2018-12-12
Payer: MEDICARE

## 2018-12-12 VITALS
HEART RATE: 57 BPM | HEIGHT: 72 IN | BODY MASS INDEX: 31.69 KG/M2 | WEIGHT: 234 LBS | DIASTOLIC BLOOD PRESSURE: 55 MMHG | RESPIRATION RATE: 19 BRPM | OXYGEN SATURATION: 97 % | TEMPERATURE: 98.9 F | SYSTOLIC BLOOD PRESSURE: 98 MMHG

## 2018-12-12 DIAGNOSIS — D68.61 ANTIPHOSPHOLIPID ANTIBODY SYNDROME (H): ICD-10-CM

## 2018-12-12 DIAGNOSIS — Z96.641 STATUS POST TOTAL REPLACEMENT OF RIGHT HIP: Primary | ICD-10-CM

## 2018-12-12 DIAGNOSIS — M16.11 OSTEOARTHRITIS OF RIGHT HIP, UNSPECIFIED OSTEOARTHRITIS TYPE: ICD-10-CM

## 2018-12-12 DIAGNOSIS — Z89.512 HISTORY OF LEFT BELOW KNEE AMPUTATION (H): ICD-10-CM

## 2018-12-12 DIAGNOSIS — I25.5 ISCHEMIC CARDIOMYOPATHY: ICD-10-CM

## 2018-12-12 DIAGNOSIS — C83.00 MALIGNANT LYMPHOPLASMACYTIC LYMPHOMA (H): ICD-10-CM

## 2018-12-12 DIAGNOSIS — I10 BENIGN ESSENTIAL HYPERTENSION: ICD-10-CM

## 2018-12-12 DIAGNOSIS — F10.10 ALCOHOL ABUSE: ICD-10-CM

## 2018-12-12 DIAGNOSIS — K70.31 ALCOHOLIC CIRRHOSIS OF LIVER WITH ASCITES (H): ICD-10-CM

## 2018-12-12 DIAGNOSIS — R53.81 PHYSICAL DECONDITIONING: ICD-10-CM

## 2018-12-12 DIAGNOSIS — I77.810 AORTIC ROOT DILATATION (H): ICD-10-CM

## 2018-12-12 PROCEDURE — 99309 SBSQ NF CARE MODERATE MDM 30: CPT | Performed by: NURSE PRACTITIONER

## 2018-12-12 ASSESSMENT — MIFFLIN-ST. JEOR: SCORE: 1834.42

## 2018-12-12 NOTE — PROGRESS NOTES
Ortho Nursing home visit    Antonio Ramirez is a 73 year old male who resides at [facility] recovering from Right TOBI:    Patient is seen today for wound check, 2 weeks S/P TOBI , should be noted patient has contralateral BKA and is limited now with ambulation in PT: 2nd to wound issue around prosthesis;      Past Medical History:   Diagnosis Date     Alcoholism (H)      Amputated left leg (H)      Anemia      Anticardiolipin antibody syndrome (H)      Antiphospholipid antibody syndrome (H)      Antiplatelet or antithrombotic long-term use      Aortic root dilatation (H)      Aortic stenosis      Arthritis      Balance problem      Coronary artery disease     CABG 2007: SVG to LAD, SVG to diagonal, SVG to OM; cardiac cath 5/17/18: thrombectomy for restenosis of stents-sent for urgent CABG, cath 5/14/2007: GRECIA x2 to LAD, G[place]ia to diagonal     Gastro-oesophageal reflux disease      Heart attack (H) 2007    apparently arrested, cardiac X5     Hiatal hernia      Hypercholesterolemia      Hypertension      Ischemic cardiomyopathy      Left foot drop      Liver disease     alcoholic liver disease, alcoholic cirrohsosis, portal hypertension     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)     ?When diagnosed, patient not aware of this     Moderate mitral regurgitation      Monoclonal gammopathy      Neuropathy      Noninfectious ileitis     diverticulitis of colon     Nonrheumatic tricuspid valve regurgitation      Paroxysmal atrial fibrillation (H)      PE (pulmonary embolism) 2006    saddle emboli 2006     Prostate cancer (H) 2000    Treated with radiation (seed implants in 2000) -- no problems since     Pulmonary hypertension (H)      Renal disease     kidney stones     Syncope      Thrombocytopenia (H)      Urethral stricture      Venous thrombosis     IVC filter and lysis procedures 2007      Past Surgical History:   Procedure Laterality Date     AMPUTATE LEG BELOW KNEE Left 04/2014    related to gangrene, started  as foot ulcer related to neuropathy     AMPUTATE LEG BELOW KNEE Left 12/4/2017    Procedure: AMPUTATE LEG BELOW KNEE;  Exploration of Left Leg Below Knee Amputation Stump with Ligation of Bleeders.;  Surgeon: Leandro Arreola MD;  Location:  OR     APPENDECTOMY       APPLY WOUND VAC Left 12/6/2017    Procedure: APPLY WOUND VAC;;  Surgeon: Saima Howard MD;  Location:  SD     ARTHROPLASTY HIP Right 11/27/2018    Procedure: RIGHT TOTAL HIP ARTHROPLASTY;  Surgeon: Saima Howard MD;  Location:  OR     ARTHROPLASTY KNEE Right 9/19/2014    Procedure: ARTHROPLASTY KNEE;  Surgeon: Saima Howard MD;  Location:  OR     CARDIAC SURGERY      CABG     CHOLECYSTECTOMY       COLONOSCOPY       COMBINED CYSTOSCOPY, RETROGRADES, EXCHANGE STENT URETER(S) Left 7/24/2018    Procedure: COMBINED CYSTOSCOPY, RETROGRADES, EXCHANGE STENT URETER(S);  CYSTOSCOPY, BILATERAL RETROGRADES, BILATERAL URETERAL STENT EXCHANGE ;  Surgeon: Matt Cervantes MD;  Location:  OR     CRANIOTOMY Right 4/7/2018    Procedure: CRANIOTOMY;  Right Craniotomy For Subdural Hematoma;  Surgeon: Jono Issa MD;  Location:  OR     CYSTOSCOPY, DILATE URETHRA, COMBINED N/A 10/12/2016    Procedure: COMBINED CYSTOSCOPY, DILATE URETHRA;  Surgeon: Dimitry Bello MD;  Location:  OR     CYSTOSCOPY, REMOVE STENT(S), COMBINED Right 7/24/2018    Procedure: COMBINED CYSTOSCOPY, REMOVE STENT(S);;  Surgeon: Jose Hunter MD;  Location:  OR     ENDOSCOPIC STRIPPING VEIN(S)       esophagogastroduodenoscopy       EYE SURGERY      cataracts     GENITOURINARY SURGERY      Prostate Seen Implants     HERNIA REPAIR      Umbilical     INCISION AND DRAINAGE LOWER EXTREMITY, COMBINED Left 12/1/2017    Procedure: COMBINED INCISION AND DRAINAGE LOWER EXTREMITY;  INCISION AND DRAINAGE OF LEFT BELOW KNEE AMPUTATION ABSCESS, WOUND VAC PLACEMENT;  Surgeon: Saima Howard MD;  Location:  OR     IR IVC FILTER PLACEMENT        "IRRIGATION AND DEBRIDEMENT LOWER EXTREMITY, COMBINED Left 12/6/2017    Procedure: COMBINED IRRIGATION AND DEBRIDEMENT LOWER EXTREMITY;  IRRIGATION AND DEBRIDEMENT OF LEFT BELOW KNEE AMPUTATION SITE WITH WOUND VAC REPLACEMENT;  Surgeon: Saima Howard MD;  Location: [facility]     IRRIGATION AND DEBRIDEMENT LOWER EXTREMITY, COMBINED Left 12/8/2017    Procedure: COMBINED IRRIGATION AND DEBRIDEMENT LOWER EXTREMITY;  IRRIGATION AND DEBRIDEMENT OF LEFT BELOW THE KNEE AMPUTATION AND SHORTENING OF THE TIBIA WITH WOUND CLOSURE;  Surgeon: Saima Howard MD;  Location:  OR     LASER HOLMIUM LITHOTRIPSY URETER(S), INSERT STENT, COMBINED Bilateral 6/28/2018    Procedure: COMBINED CYSTOSCOPY, URETEROSCOPY, LASER HOLMIUM LITHOTRIPSY URETER(S), INSERT STENT;  CYSTOSCOPY, BILATERAL URETEROSCOPY,  HOLMIUM LASER LITHOTRIPSY, BILATERAL STENT PLACEMENT, STONE BASKETING;  Surgeon: Jose Hunter MD;  Location:  OR     LASER HOLMIUM LITHOTRIPSY URETER(S), INSERT STENT, COMBINED Left 8/14/2018    Procedure: COMBINED CYSTOSCOPY, URETEROSCOPY, LASER HOLMIUM LITHOTRIPSY URETER(S), INSERT STENT;  CYSTOSCOPY, LEFT URETEROSCOPY, LEFT LASER HOLMIUM LITHOTRIPSY URETER(S) REMOVAL BILATERAL STENTS;  Surgeon: Jose Hunter MD;  Location:  OR     ORTHOPEDIC SURGERY      (R) knee scope     ORTHOPEDIC SURGERY      total hip      ORTHOPEDIC SURGERY      elbow surgery        Allergies   Allergen Reactions     Ciprofloxacin Itching     Severe itching \"like ants were crawling\"     Hmg-Coa-R Inhibitors Other (See Comments)     Rhabdomyolysis occurred within a couple days      There were no vitals taken for this visit.     Exam: Incision clean dry , intact, no staples in place; sub/q/ sutures used for closure:      X-rays pending:    ASSESSMENT / PLAN:  Discussed W/B and ISO quad sets to work towards goal of discharge to home; will continue to follow, on-site as patient has missed his appt with surgeon for F/U>    I discussed the need to " follow in 1 month with surgeon:    [id number]          Anna OPA-C  [phone]

## 2018-12-12 NOTE — LETTER
12/12/2018        RE: Antonio Ramirez  [address]  [place] [address]        [facility]    Chief Complaint   Patient presents with     RECHECK       [facility] Medical Record Number:  [id number]  Place of Service where encounter took place:  [facility] [[id number]]    HPI:    Antonio Ramirez is a 73 year old  (1945), who is being seen today for an episodic care visit.  HPI information obtained from: patient report, [facility] chart review and family/first contact [place] report.  Current issues are:    PMH and summary of most recent hospitalization below:         Status post total replacement of right hip  Osteoarthritis of right hip, unspecified osteoarthritis type  Physical deconditioning  Hx of left below-the-knee amputation  Patient underwent right total hip arthroplasty at [facility] (primary surgeon Dr. Howard) on 11/27/18 d/t hx of osteoarthritis of the right hip. EBL 1200 mL - required 3 units PRBCs inpatient (11/29, 12/1 & 12/5). Dose of Venofer given 12/5 for possible iron-deficiency anemia. Prophy antibiotics x 24-hours post-op. Pain moderately controlled with PO Dilaudid (had been receiving 4 mg doses when experienced Syncope inpatient; now down to 2 mg with reports of poor pain control). Episode of ANSELMO - creatinine up to 2.09, estimated GFR 31 - improved to creatinine of 1.23, estimated GFR 57 12/4/18.     To note, patient previously had a left total arthroplasty and left below-the-knee amputation approximately 5 years ago in [place]. 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 It was noticed during hospitalization that there was a fissure on the left stump and concern for proper wound care (i.e. malodorous, no sock between prosthesis & skin, etc.). Wound care orders from hospital in plan below.      Ischemic  cardiomyopathy  Antiphospholipid antibody syndrome (H)  Paroxysmal atrial fibrillation (H)  Benign essential hypertension  Aortic Root Dilation  Echo 7/11/18 - EF 40-45% with anterior, septal & apical wall akinesis  Patient anticoagulated with chronic Xarelto d/t history of PE presumably related to his antiphospholipid antibody syndrome - held during immediately prior to and during surgery; restarted POD #1.   Coreg for chronic A-Fib.   Syncopal episode w/LOC during hospitalization (11/29/18 & 11/30/18) - etiology multifactorial (pain meds vs. vagal response vs. known aortic stenosis and aortic root dilation vs. Pulmonary hypertension vs. ?); recovered with fluids and RBCs.   Restarted Torsemide 12/2/18; US 12/3 to r/o DVT - recommend compression stockings.     Alcohol abuse  Possible alcoholic cirrhosis of the liver w/ascites  Patient reports h/o alcohol abuse, stopping in 2015 but ETOH level from 10/31/18 was 0.10. Family reports 40+ years of heavy drinking.   Possible alcoholic cirrhosis w/ascites although this was no mentioned in CT imaging; had a paracentesis 10/30/18, 3.3L removed     Malignant Lymphoplasmacytic Lymphoma  Diagnosed via BMBX - no evidence of progression in BMBX September 2018. Followed by Dr. Raymond at [facility]    ALLERGIES: Ciprofloxacin and Hmg-coa-r inhibitors  Past Medical, Surgical, Family and Social History reviewed and updated in [facility].    Current Outpatient Medications   Medication Sig Dispense Refill     aspirin 81 MG tablet Take 81 mg by mouth daily       carvedilol (COREG) 3.125 MG tablet Take 1 tablet (3.125 mg) by mouth 2 times daily (with meals) 180 tablet 1     fluticasone (FLONASE) 50 MCG/ACT spray Spray 1 spray into both nostrils daily       GABAPENTIN PO Take 600 mg by mouth 2 times daily       HYDROmorphone (DILAUDID) 2 MG tablet Take 2 mg by mouth every 6 hours        hydrOXYzine (ATARAX) 10 MG tablet Take 10 mg by mouth every 6 hours       lidocaine (LIDODERM) 5 %  patch Place 2 patches onto the skin every 24 hours       nystatin (MYCOSTATIN) 702229 UNIT/GM external cream Apply topically 2 times daily       ondansetron (ZOFRAN-ODT) 4 MG ODT tab Take 1 tablet (4 mg) by mouth every 6 hours as needed for nausea or vomiting 30 tablet 0     penicillin V (VEETID) 250 MG tablet Take 250 mg by mouth every 6 hours For 5 days.       polyethylene glycol (MIRALAX/GLYCOLAX) packet Take 17 g by mouth daily       Rivaroxaban (XARELTO PO) Take 20 mg by mouth daily       senna-docusate (SENOKOT-S/PERICOLACE) 8.6-50 MG tablet Take 1 tablet by mouth 2 times daily 30 tablet 0     TORSEMIDE PO Take 20 mg by mouth daily       Medications reviewed:  Medications reconciled to facility chart and changes were made to reflect current medications as identified as above med list. Below are the changes that were made:   Medications stopped since last EPIC medication reconciliation:   There are no discontinued medications.    Medications started since last [facility] medication reconciliation:  No orders of the defined types were placed in this encounter.      REVIEW OF SYSTEMS:  4 point ROS including Respiratory, CV, GI and , other than that noted in the HPI,  is negative    Physical Exam:  BP 98/55   Pulse 57   Temp 98.9  F (37.2  C)   Resp 19   Ht 1.829 m (6')   Wt 106.1 kg (234 lb)   SpO2 97%   BMI 31.74 kg/m     GENERAL APPEARANCE:  Alert, in no distress  ENT:  Mouth and posterior oropharynx normal, moist mucous membranes, hearing acuity intact  EYES:  EOM, conjunctivae, lids, pupils and irises normal  NECK:  No adenopathy,masses or thyromegaly  RESP:  respiratory effort normal, no respiratory distress, Lung sounds clear throughout  CV:  rate and rhythm irregularly irregular rhythm (chronic a-fib), Grade 3 systolic murmur best heard over the RUSB, but present throughout, no rub or gallop, Edema 2+ in the right foot and 2+ in the entire right leg. Lymphedema wrap in place.   ABDOMEN:  normal bowel  sounds, soft, nontender, no hepatosplenomegaly or other masses; distended r/t ascites  M/S:   Gait and station not observed, Digits and nails with mild thickening, no clubbing  SKIN: extensive bruising around the right hip, non-blanchable, incision with scant dried bloody drainage and sanguinous drainage on bandages, no expanding erythema or edema around incision. Left stump with approximately 4 cm x 2 cm crusting fissure - no expanding erythema or edema, mild ecchymosis near stump, blanchable.   NEURO: 2-12 in normal limits and at patient's baseline  PSYCH:  Insight, judgement, and memory intact, affect and mood normal    Recent Labs:  CBC RESULTS:   Recent Labs   Lab Test 12/07/18  0700 12/05/18  0729  12/02/18  0652   WBC 7.4  --   --  6.2   RBC 2.93*  --   --  2.59*   HGB 9.0* 8.8*   < > 8.1*   HCT 26.6*  --   --  23.9*   MCV 91  --   --  92   MCH 30.7  --   --  31.3   MCHC 33.8  --   --  33.9   RDW 16.5*  --   --  16.6*    125*  --  112*    < > = values in this interval not displayed.       Last Basic Metabolic Panel:  Recent Labs   Lab Test 12/11/18  1311 12/07/18  0700    137   POTASSIUM 4.3 3.8   CHLORIDE 101 101   BENNIE 8.6 8.8   CO2 28 28   BUN 42* 36*   CR 1.42* 1.23   * 81       Liver Function Studies -   Recent Labs   Lab Test 12/11/18  1311 12/07/18  0700   PROTTOTAL 6.7* 6.2*   ALBUMIN 2.7* 2.6*   BILITOTAL 2.2* 3.0*   ALKPHOS 95 72   AST 38 26   ALT 13 9       TSH   Date Value Ref Range Status   10/31/2018 0.86 0.40 - 4.00 mU/L Final   10/31/2018 Canceled, Test credited, specimen discarded 0.40 - 4.00 mU/L Final     Comment:     Unsatisfactory specimen - hemolyzed   ]    Lab Results   Component Value Date    A1C 4.6 05/18/2018         Assessment/Plan:  (Z96.641) Status post total replacement of right hip  (primary encounter diagnosis)  (M16.11) Osteoarthritis of right hip, unspecified osteoarthritis type  (R53.81) Physical deconditioning  (K59.00) Constipation  Comment: POD #15 s/p  right TKA. Baseline creatinine 0.92 - 1.20 (estimated GFR 59 11/8/18 c/w stage III CKD), CMP 12/7 with stable creatinine at 1.23., denies abdominal pain. Patient states pain is a 0, but is very reluctant to tapering of opioids (see narrative above). Working well with PT and OT. Patient missed ortho follow up.   Plan: continue Taper Dilaudid to q6h and Hydroxyzine q6h PRN; Lidocaine patches as scheduled. Ok to remove and replace outer dressing; DO NO touch prineo (mesh) inner dressing. Educated patient on the importance of ice post-op to help with inflammation. Avoid NSAIDs and Acetaminophen with kidney and liver impairment. Continue Miralax w/Senna BID for bowel function; escalate if needed. Aggressive PT & OT as tolerated; recommend Vanilla/Boost ensures TID for nutrition (patient prefers Ensures and drinks these at home).  Post op xrays here today. KINGS MCDANIEL to review.     (N30.00) Acute cystitis without hematuria  Comment: UA w/10 WBCs, small leukocytes - cx positive for >100,000 colonies/mL of enterococcus faecalis. Afebrile since; asymptomatic.   Plan: Penicillin 250 mg QID x 5 days (last dose 12/14/18).     (Z89.512) History of left below knee amputation (H)  Comment: 4 cm x 2 cm fissure with brown crusting, no expanding erythema, edema or increased pain.   Plan: Plan of care for left stump: 2x/ day   1. Clean with gentle soap and water, dry and dry again.  Make sure is dry before putting on prosthetic..  2. Apply lotion down to the very distal aspect of the stump but not on the very bottom/ distal aspect- want that to become dry and callused  3. Apply very small dab of Vaseline just where the fissure is.  Goal is to keep that just a bit moist so it can heal but not moisten the surround callus  4. Cover distal stump with one, dry 4x4 then a 4x4 that is opened and conformed to the stump  5. Wear a washable sleeve  6. Keep rubbery prosthetic clean    (I25.5) Ischemic cardiomyopathy  Comment: EF 35-40%  Plan:  Torsemide 20 mg daily - monitor RLE edema and HF. Follow-up with cardiologist Dr. Downs 12/19 at 10:45 am.    (D68.61) Antiphospholipid antibody syndrome (H)  Comment: Hx of PE; on chronic anticoagulant.   Plan: Continue Xarelto and baby aspirin.     (I10) Benign essential hypertension  (I48.0) Paroxysmal A-fib  (I77.810) Aortic root dilatation (H)  Comment: BPs 100's-120's/50's-70's; HR 50's-70's  Plan: Continue Coreg.     (F10.10) Alcohol abuse  (K70.31) Alcoholic cirrhosis of liver with ascites (H)  Comment: no signs of alcohol withdrawal; patient's abdomen is taught r/t fluid/ascites. Used to do paracentesis every 2 weeks in [place]; states not as frequently here has he needs new orders every time. Last paracentesis 10/31/18. Bilirubin 3.0 on 12/7/18 (baseline in hospital < 1.0); asymptomatic aside from jaundice.   Plan: Paracentesis delayed as INR 3.82. Pt should follow up with Dr Viral ANDINO.     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   Plan: last seen 10/25/18 - follow-up in 6 months (April 2019).         Electronically signed by  DIPAK Huggins CNP                      Sincerely,        DIPAK Huggins CNP

## 2018-12-12 NOTE — PROGRESS NOTES
[place]    Chief Complaint   Patient presents with     NEENA       [facility] Medical Record Number:  [id number]  [facility] of Service where encounter took place:  [facility] [[id number]]    HPI:    Antonio Ramirez is a 73 year old  (1945), who is being seen today for an episodic care visit.  HPI information obtained from: patient report, [facility] chart review and family/first contact [place] report.  Current issues are:    PMH and summary of most recent hospitalization below:         Status post total replacement of right hip  Osteoarthritis of right hip, unspecified osteoarthritis type  Physical deconditioning  Hx of left below-the-knee amputation  Patient underwent right total hip arthroplasty at [facility] (primary surgeon Dr. Howard) on 11/27/18 d/t hx of osteoarthritis of the right hip. EBL 1200 mL - required 3 units PRBCs inpatient (11/29, 12/1 & 12/5). Dose of Venofer given 12/5 for possible iron-deficiency anemia. Prophy antibiotics x 24-hours post-op. Pain moderately controlled with PO Dilaudid (had been receiving 4 mg doses when experienced Syncope inpatient; now down to 2 mg with reports of poor pain control). Episode of ANSELMO - creatinine up to 2.09, estimated GFR 31 - improved to creatinine of 1.23, estimated GFR 57 12/4/18.     To note, patient previously had a left total arthroplasty and left below-the-knee amputation approximately 5 years ago in [place]. 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 It was noticed during hospitalization that there was a fissure on the left stump and concern for proper wound care (i.e. malodorous, no sock between prosthesis & skin, etc.). Wound care orders from hospital in plan below.      Ischemic cardiomyopathy  Antiphospholipid antibody syndrome (H)  Paroxysmal atrial fibrillation (H)  Benign essential  hypertension  Aortic Root Dilation  Echo 7/11/18 - EF 40-45% with anterior, septal & apical wall akinesis  Patient anticoagulated with chronic Xarelto d/t history of PE presumably related to his antiphospholipid antibody syndrome - held during immediately prior to and during surgery; restarted POD #1.   Coreg for chronic A-Fib.   Syncopal episode w/LOC during hospitalization (11/29/18 & 11/30/18) - etiology multifactorial (pain meds vs. vagal response vs. known aortic stenosis and aortic root dilation vs. Pulmonary hypertension vs. ?); recovered with fluids and RBCs.   Restarted Torsemide 12/2/18; US 12/3 to r/o DVT - recommend compression stockings.     Alcohol abuse  Possible alcoholic cirrhosis of the liver w/ascites  Patient reports h/o alcohol abuse, stopping in 2015 but ETOH level from 10/31/18 was 0.10. Family reports 40+ years of heavy drinking.   Possible alcoholic cirrhosis w/ascites although this was no mentioned in CT imaging; had a paracentesis 10/30/18, 3.3L removed     Malignant Lymphoplasmacytic Lymphoma  Diagnosed via BMBX - no evidence of progression in BMBX September 2018. Followed by Dr. Raymond at [facility]    ALLERGIES: Ciprofloxacin and Hmg-coa-r inhibitors  Past Medical, Surgical, Family and Social History reviewed and updated in [facility].    Current Outpatient Medications   Medication Sig Dispense Refill     aspirin 81 MG tablet Take 81 mg by mouth daily       carvedilol (COREG) 3.125 MG tablet Take 1 tablet (3.125 mg) by mouth 2 times daily (with meals) 180 tablet 1     fluticasone (FLONASE) 50 MCG/ACT spray Spray 1 spray into both nostrils daily       GABAPENTIN PO Take 600 mg by mouth 2 times daily       HYDROmorphone (DILAUDID) 2 MG tablet Take 2 mg by mouth every 6 hours        hydrOXYzine (ATARAX) 10 MG tablet Take 10 mg by mouth every 6 hours       lidocaine (LIDODERM) 5 % patch Place 2 patches onto the skin every 24 hours       nystatin (MYCOSTATIN) 148763 UNIT/GM external cream  Apply topically 2 times daily       ondansetron (ZOFRAN-ODT) 4 MG ODT tab Take 1 tablet (4 mg) by mouth every 6 hours as needed for nausea or vomiting 30 tablet 0     penicillin V (VEETID) 250 MG tablet Take 250 mg by mouth every 6 hours For 5 days.       polyethylene glycol (MIRALAX/GLYCOLAX) packet Take 17 g by mouth daily       Rivaroxaban (XARELTO PO) Take 20 mg by mouth daily       senna-docusate (SENOKOT-S/PERICOLACE) 8.6-50 MG tablet Take 1 tablet by mouth 2 times daily 30 tablet 0     TORSEMIDE PO Take 20 mg by mouth daily       Medications reviewed:  Medications reconciled to facility chart and changes were made to reflect current medications as identified as above med list. Below are the changes that were made:   Medications stopped since last EPIC medication reconciliation:   There are no discontinued medications.    Medications started since last [facility] medication reconciliation:  No orders of the defined types were placed in this encounter.      REVIEW OF SYSTEMS:  4 point ROS including Respiratory, CV, GI and , other than that noted in the HPI,  is negative    Physical Exam:  BP 98/55   Pulse 57   Temp 98.9  F (37.2  C)   Resp 19   Ht 1.829 m (6')   Wt 106.1 kg (234 lb)   SpO2 97%   BMI 31.74 kg/m    GENERAL APPEARANCE:  Alert, in no distress  ENT:  Mouth and posterior oropharynx normal, moist mucous membranes, hearing acuity intact  EYES:  EOM, conjunctivae, lids, pupils and irises normal  NECK:  No adenopathy,masses or thyromegaly  RESP:  respiratory effort normal, no respiratory distress, Lung sounds clear throughout  CV:  rate and rhythm irregularly irregular rhythm (chronic a-fib), Grade 3 systolic murmur best heard over the RUSB, but present throughout, no rub or gallop, Edema 2+ in the right foot and 2+ in the entire right leg. Lymphedema wrap in place.   ABDOMEN:  normal bowel sounds, soft, nontender, no hepatosplenomegaly or other masses; distended r/t ascites  M/S:   Gait and station  not observed, Digits and nails with mild thickening, no clubbing  SKIN: extensive bruising around the right hip, non-blanchable, incision with scant dried bloody drainage and sanguinous drainage on bandages, no expanding erythema or edema around incision. Left stump with approximately 4 cm x 2 cm crusting fissure - no expanding erythema or edema, mild ecchymosis near stump, blanchable.   NEURO: 2-12 in normal limits and at patient's baseline  PSYCH:  Insight, judgement, and memory intact, affect and mood normal    Recent Labs:  CBC RESULTS:   Recent Labs   Lab Test 12/07/18  0700 12/05/18  0729  12/02/18  0652   WBC 7.4  --   --  6.2   RBC 2.93*  --   --  2.59*   HGB 9.0* 8.8*   < > 8.1*   HCT 26.6*  --   --  23.9*   MCV 91  --   --  92   MCH 30.7  --   --  31.3   MCHC 33.8  --   --  33.9   RDW 16.5*  --   --  16.6*    125*  --  112*    < > = values in this interval not displayed.       Last Basic Metabolic Panel:  Recent Labs   Lab Test 12/11/18  1311 12/07/18  0700    137   POTASSIUM 4.3 3.8   CHLORIDE 101 101   BENNIE 8.6 8.8   CO2 28 28   BUN 42* 36*   CR 1.42* 1.23   * 81       Liver Function Studies -   Recent Labs   Lab Test 12/11/18  1311 12/07/18  0700   PROTTOTAL 6.7* 6.2*   ALBUMIN 2.7* 2.6*   BILITOTAL 2.2* 3.0*   ALKPHOS 95 72   AST 38 26   ALT 13 9       TSH   Date Value Ref Range Status   10/31/2018 0.86 0.40 - 4.00 mU/L Final   10/31/2018 Canceled, Test credited, specimen discarded 0.40 - 4.00 mU/L Final     Comment:     Unsatisfactory specimen - hemolyzed   ]    Lab Results   Component Value Date    A1C 4.6 05/18/2018         Assessment/Plan:  (Z96.641) Status post total replacement of right hip  (primary encounter diagnosis)  (M16.11) Osteoarthritis of right hip, unspecified osteoarthritis type  (R53.81) Physical deconditioning  (K59.00) Constipation  Comment: POD #15 s/p right TKA. Baseline creatinine 0.92 - 1.20 (estimated GFR 59 11/8/18 c/w stage III CKD), CMP 12/7 with stable  creatinine at 1.23., denies abdominal pain. Patient states pain is a 0, but is very reluctant to tapering of opioids (see narrative above). Working well with PT and OT. Patient missed ortho follow up.   Plan: continue Taper Dilaudid to q6h and Hydroxyzine q6h PRN; Lidocaine patches as scheduled. Ok to remove and replace outer dressing; DO NO touch prineo (mesh) inner dressing. Educated patient on the importance of ice post-op to help with inflammation. Avoid NSAIDs and Acetaminophen with kidney and liver impairment. Continue Miralax w/Senna BID for bowel function; escalate if needed. Aggressive PT & OT as tolerated; recommend Vanilla/Boost ensures TID for nutrition (patient prefers Ensures and drinks these at home).  Post op xrays here today. KINGS MCDANIEL to review.     (N30.00) Acute cystitis without hematuria  Comment: UA w/10 WBCs, small leukocytes - cx positive for >100,000 colonies/mL of enterococcus faecalis. Afebrile since; asymptomatic.   Plan: Penicillin 250 mg QID x 5 days (last dose 12/14/18).     (Z89.512) History of left below knee amputation (H)  Comment: 4 cm x 2 cm fissure with brown crusting, no expanding erythema, edema or increased pain.   Plan: Plan of care for left stump: 2x/ day   1. Clean with gentle soap and water, dry and dry again.  Make sure is dry before putting on prosthetic..  2. Apply lotion down to the very distal aspect of the stump but not on the very bottom/ distal aspect- want that to become dry and callused  3. Apply very small dab of Vaseline just where the fissure is.  Goal is to keep that just a bit moist so it can heal but not moisten the surround callus  4. Cover distal stump with one, dry 4x4 then a 4x4 that is opened and conformed to the stump  5. Wear a washable sleeve  6. Keep rubbery prosthetic clean    (I25.5) Ischemic cardiomyopathy  Comment: EF 35-40%  Plan: Torsemide 20 mg daily - monitor RLE edema and HF. Follow-up with cardiologist Dr. Downs 12/19 at 10:45  am.    (D68.61) Antiphospholipid antibody syndrome (H)  Comment: Hx of PE; on chronic anticoagulant.   Plan: Continue Xarelto and baby aspirin.     (I10) Benign essential hypertension  (I48.0) Paroxysmal A-fib  (I77.810) Aortic root dilatation (H)  Comment: BPs 100's-120's/50's-70's; HR 50's-70's  Plan: Continue Coreg.     (F10.10) Alcohol abuse  (K70.31) Alcoholic cirrhosis of liver with ascites (H)  Comment: no signs of alcohol withdrawal; patient's abdomen is taught r/t fluid/ascites. Used to do paracentesis every 2 weeks in [place]; states not as frequently here has he needs new orders every time. Last paracentesis 10/31/18. Bilirubin 3.0 on 12/7/18 (baseline in hospital < 1.0); asymptomatic aside from jaundice.   Plan: Paracentesis delayed as INR 3.82. Pt should follow up with Dr Viral ANDINO.     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   Plan: last seen 10/25/18 - follow-up in 6 months (April 2019).         Electronically signed by  DIPAK Huggins CNP

## 2018-12-13 ENCOUNTER — NURSING HOME VISIT (OUTPATIENT)
Dept: GERIATRICS | Facility: CLINIC | Age: 73
End: 2018-12-13
Payer: MEDICARE

## 2018-12-13 ENCOUNTER — HOSPITAL ENCOUNTER (EMERGENCY)
Facility: CLINIC | Age: 73
Discharge: HOME | End: 2018-12-13
Attending: EMERGENCY MEDICINE | Admitting: EMERGENCY MEDICINE
Payer: MEDICARE

## 2018-12-13 VITALS
OXYGEN SATURATION: 96 % | RESPIRATION RATE: 14 BRPM | HEART RATE: 80 BPM | SYSTOLIC BLOOD PRESSURE: 100 MMHG | TEMPERATURE: 99.1 F | DIASTOLIC BLOOD PRESSURE: 68 MMHG

## 2018-12-13 VITALS
RESPIRATION RATE: 16 BRPM | HEART RATE: 67 BPM | BODY MASS INDEX: 31.69 KG/M2 | TEMPERATURE: 99.6 F | WEIGHT: 234 LBS | SYSTOLIC BLOOD PRESSURE: 125 MMHG | HEIGHT: 72 IN | DIASTOLIC BLOOD PRESSURE: 56 MMHG | OXYGEN SATURATION: 91 %

## 2018-12-13 DIAGNOSIS — Z89.512 HISTORY OF LEFT BELOW KNEE AMPUTATION (H): ICD-10-CM

## 2018-12-13 DIAGNOSIS — M16.11 OSTEOARTHRITIS OF RIGHT HIP, UNSPECIFIED OSTEOARTHRITIS TYPE: ICD-10-CM

## 2018-12-13 DIAGNOSIS — Z96.641 STATUS POST TOTAL REPLACEMENT OF RIGHT HIP: Primary | ICD-10-CM

## 2018-12-13 DIAGNOSIS — R53.81 PHYSICAL DECONDITIONING: ICD-10-CM

## 2018-12-13 DIAGNOSIS — F10.10 ALCOHOL ABUSE: ICD-10-CM

## 2018-12-13 DIAGNOSIS — I25.5 ISCHEMIC CARDIOMYOPATHY: ICD-10-CM

## 2018-12-13 DIAGNOSIS — R40.0 SOMNOLENCE: ICD-10-CM

## 2018-12-13 DIAGNOSIS — K70.31 ALCOHOLIC CIRRHOSIS OF LIVER WITH ASCITES (H): ICD-10-CM

## 2018-12-13 DIAGNOSIS — Z98.890 POSTOPERATIVE STATE: ICD-10-CM

## 2018-12-13 DIAGNOSIS — C83.00 MALIGNANT LYMPHOPLASMACYTIC LYMPHOMA (H): ICD-10-CM

## 2018-12-13 DIAGNOSIS — D68.61 ANTIPHOSPHOLIPID ANTIBODY SYNDROME (H): ICD-10-CM

## 2018-12-13 DIAGNOSIS — I10 BENIGN ESSENTIAL HYPERTENSION: ICD-10-CM

## 2018-12-13 DIAGNOSIS — I77.810 AORTIC ROOT DILATATION (H): ICD-10-CM

## 2018-12-13 LAB
ALBUMIN SERPL-MCNC: 2.6 G/DL (ref 3.4–5)
ALBUMIN UR-MCNC: NEGATIVE MG/DL
ALP SERPL-CCNC: 106 U/L (ref 40–150)
ALT SERPL W P-5'-P-CCNC: 16 U/L (ref 0–70)
AMMONIA PLAS-SCNC: 44 UMOL/L (ref 10–50)
ANION GAP SERPL CALCULATED.3IONS-SCNC: 6 MMOL/L (ref 3–14)
APPEARANCE UR: CLEAR
AST SERPL W P-5'-P-CCNC: 47 U/L (ref 0–45)
BACTERIA #/AREA URNS HPF: ABNORMAL /HPF
BASOPHILS # BLD AUTO: 0.1 10E9/L (ref 0–0.2)
BASOPHILS NFR BLD AUTO: 0.8 %
BILIRUB SERPL-MCNC: 2.3 MG/DL (ref 0.2–1.3)
BILIRUB UR QL STRIP: NEGATIVE
BUN SERPL-MCNC: 44 MG/DL (ref 7–30)
CALCIUM SERPL-MCNC: 8.5 MG/DL (ref 8.5–10.1)
CHLORIDE SERPL-SCNC: 101 MMOL/L (ref 94–109)
CO2 SERPL-SCNC: 29 MMOL/L (ref 20–32)
COLOR UR AUTO: YELLOW
CREAT SERPL-MCNC: 1.63 MG/DL (ref 0.66–1.25)
DIFFERENTIAL METHOD BLD: ABNORMAL
EOSINOPHIL # BLD AUTO: 0.1 10E9/L (ref 0–0.7)
EOSINOPHIL NFR BLD AUTO: 1.3 %
ERYTHROCYTE [DISTWIDTH] IN BLOOD BY AUTOMATED COUNT: 17.1 % (ref 10–15)
GFR SERPL CREATININE-BSD FRML MDRD: 41 ML/MIN/1.7M2
GLUCOSE SERPL-MCNC: 119 MG/DL (ref 70–99)
GLUCOSE UR STRIP-MCNC: NEGATIVE MG/DL
HCT VFR BLD AUTO: 22.9 % (ref 40–53)
HGB BLD-MCNC: 7.7 G/DL (ref 13.3–17.7)
HGB UR QL STRIP: NEGATIVE
IMM GRANULOCYTES # BLD: 0 10E9/L (ref 0–0.4)
IMM GRANULOCYTES NFR BLD: 0.2 %
KETONES UR STRIP-MCNC: NEGATIVE MG/DL
LEUKOCYTE ESTERASE UR QL STRIP: NEGATIVE
LYMPHOCYTES # BLD AUTO: 1 10E9/L (ref 0.8–5.3)
LYMPHOCYTES NFR BLD AUTO: 16.2 %
MCH RBC QN AUTO: 31 PG (ref 26.5–33)
MCHC RBC AUTO-ENTMCNC: 33.6 G/DL (ref 31.5–36.5)
MCV RBC AUTO: 92 FL (ref 78–100)
MONOCYTES # BLD AUTO: 0.5 10E9/L (ref 0–1.3)
MONOCYTES NFR BLD AUTO: 8.7 %
NEUTROPHILS # BLD AUTO: 4.4 10E9/L (ref 1.6–8.3)
NEUTROPHILS NFR BLD AUTO: 72.8 %
NITRATE UR QL: NEGATIVE
NRBC # BLD AUTO: 0 10*3/UL
NRBC BLD AUTO-RTO: 0 /100
PH UR STRIP: 5.5 PH (ref 5–7)
PLATELET # BLD AUTO: 204 10E9/L (ref 150–450)
POTASSIUM SERPL-SCNC: 4.6 MMOL/L (ref 3.4–5.3)
PROT SERPL-MCNC: 6.7 G/DL (ref 6.8–8.8)
RBC # BLD AUTO: 2.48 10E12/L (ref 4.4–5.9)
RBC #/AREA URNS AUTO: 2 /HPF (ref 0–2)
SODIUM SERPL-SCNC: 136 MMOL/L (ref 133–144)
SOURCE: ABNORMAL
SP GR UR STRIP: 1.01 (ref 1–1.03)
SQUAMOUS #/AREA URNS AUTO: <1 /HPF (ref 0–1)
UROBILINOGEN UR STRIP-MCNC: NORMAL MG/DL (ref 0–2)
WBC # BLD AUTO: 6.1 10E9/L (ref 4–11)
WBC #/AREA URNS AUTO: 2 /HPF (ref 0–5)

## 2018-12-13 PROCEDURE — 80053 COMPREHEN METABOLIC PANEL: CPT | Performed by: EMERGENCY MEDICINE

## 2018-12-13 PROCEDURE — 81001 URINALYSIS AUTO W/SCOPE: CPT | Performed by: EMERGENCY MEDICINE

## 2018-12-13 PROCEDURE — 99310 SBSQ NF CARE HIGH MDM 45: CPT | Performed by: NURSE PRACTITIONER

## 2018-12-13 PROCEDURE — 99284 EMERGENCY DEPT VISIT MOD MDM: CPT | Mod: 25

## 2018-12-13 PROCEDURE — 96360 HYDRATION IV INFUSION INIT: CPT

## 2018-12-13 PROCEDURE — 82140 ASSAY OF AMMONIA: CPT | Performed by: EMERGENCY MEDICINE

## 2018-12-13 PROCEDURE — 96361 HYDRATE IV INFUSION ADD-ON: CPT

## 2018-12-13 PROCEDURE — 25000128 H RX IP 250 OP 636: Performed by: EMERGENCY MEDICINE

## 2018-12-13 PROCEDURE — 85025 COMPLETE CBC W/AUTO DIFF WBC: CPT | Performed by: EMERGENCY MEDICINE

## 2018-12-13 RX ORDER — SODIUM CHLORIDE 9 MG/ML
1000 INJECTION, SOLUTION INTRAVENOUS CONTINUOUS
Status: DISCONTINUED | OUTPATIENT
Start: 2018-12-13 | End: 2018-12-13 | Stop reason: HOSPADM

## 2018-12-13 RX ADMIN — SODIUM CHLORIDE 1000 ML: 9 INJECTION, SOLUTION INTRAVENOUS at 13:25

## 2018-12-13 ASSESSMENT — ENCOUNTER SYMPTOMS
SHORTNESS OF BREATH: 0
WEAKNESS: 1
FEVER: 0
FATIGUE: 1
ABDOMINAL PAIN: 0
CONFUSION: 1

## 2018-12-13 ASSESSMENT — MIFFLIN-ST. JEOR: SCORE: 1834.42

## 2018-12-13 NOTE — ED AVS SNAPSHOT
Emergency Department  [address] [place] [address]  Phone:  [phone]  Fax:  [phone]                                    Antonio Ramirez   MRN: [id number]    Department:   Emergency Department   Date of Visit:  12/13/2018           After Visit Summary Signature Page    I have received my discharge instructions, and my questions have been answered. I have discussed any challenges I see with this plan with the nurse or doctor.    ..........................................................................................................................................  Patient/Patient Representative Signature      ..........................................................................................................................................  Patient Representative Print Name and Relationship to Patient    ..................................................               ................................................  Date                                   Time    ..........................................................................................................................................  Reviewed by Signature/Title    ...................................................              ..............................................  Date                                               Time          22EPIC Rev 08/18

## 2018-12-13 NOTE — PROGRESS NOTES
[place] [facility]    Chief Complaint   Patient presents with     NEENA       [facility] Medical Record Number:  [id number]  [facility] of Service where encounter took place[facility] [[id number]]    HPI:    Antonio Ramirez is a 73 year old  (1945), who is being seen today for an episodic care visit.  HPI information obtained from: patient report, [facility] chart review and family/first contact [place] report.  Current issues are:    PMH and summary of most recent hospitalization below:         Status post total replacement of right hip  Osteoarthritis of right hip, unspecified osteoarthritis type  Physical deconditioning  Hx of left below-the-knee amputation  Patient underwent right total hip arthroplasty at [facility] (primary surgeon Dr. Howard) on 11/27/18 d/t hx of osteoarthritis of the right hip. EBL 1200 mL - required 3 units PRBCs inpatient (11/29, 12/1 & 12/5). Dose of Venofer given 12/5 for possible iron-deficiency anemia. Prophy antibiotics x 24-hours post-op. Pain moderately controlled with PO Dilaudid (had been receiving 4 mg doses when experienced Syncope inpatient; now down to 2 mg with reports of poor pain control). Episode of ANSELMO - creatinine up to 2.09, estimated GFR 31 - improved to creatinine of 1.23, estimated GFR 57 12/4/18.     To note, patient previously had a left total arthroplasty and left below-the-knee amputation approximately 5 years ago in [place]. 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 It was noticed during hospitalization that there was a fissure on the left stump and concern for proper wound care (i.e. malodorous, no sock between prosthesis & skin, etc.). Wound care orders from hospital in plan below.      Ischemic cardiomyopathy  Antiphospholipid antibody syndrome (H)  Paroxysmal atrial fibrillation (H)  Benign essential  hypertension  Aortic Root Dilation  Echo 7/11/18 - EF 40-45% with anterior, septal & apical wall akinesis  Patient anticoagulated with chronic Xarelto d/t history of PE presumably related to his antiphospholipid antibody syndrome - held during immediately prior to and during surgery; restarted POD #1.   Coreg for chronic A-Fib.   Syncopal episode w/LOC during hospitalization (11/29/18 & 11/30/18) - etiology multifactorial (pain meds vs. vagal response vs. known aortic stenosis and aortic root dilation vs. Pulmonary hypertension vs. ?); recovered with fluids and RBCs.   Restarted Torsemide 12/2/18; US 12/3 to r/o DVT - recommend compression stockings.     Alcohol abuse  Possible alcoholic cirrhosis of the liver w/ascites  Patient reports h/o alcohol abuse, stopping in 2015 but ETOH level from 10/31/18 was 0.10. Family reports 40+ years of heavy drinking.   Possible alcoholic cirrhosis w/ascites although this was no mentioned in CT imaging; had a paracentesis 10/30/18, 3.3L removed   We did attempt to do paracentesis 12/11 but unable to do due to elevated INR 3.82  Malignant Lymphoplasmacytic Lymphoma  Diagnosed via BMBX - no evidence of progression in BMBX September 2018. Followed by Dr. Raymond at [facility]    ALLERGIES: Ciprofloxacin and Hmg-coa-r inhibitors  Past Medical, Surgical, Family and Social History reviewed and updated in [facility].    Current Outpatient Medications   Medication Sig Dispense Refill     aspirin 81 MG tablet Take 81 mg by mouth daily       carvedilol (COREG) 3.125 MG tablet Take 1 tablet (3.125 mg) by mouth 2 times daily (with meals) 180 tablet 1     fluticasone (FLONASE) 50 MCG/ACT spray Spray 1 spray into both nostrils daily       GABAPENTIN PO Take 600 mg by mouth 2 times daily       HYDROmorphone (DILAUDID) 2 MG tablet Take 2 mg by mouth every 6 hours        hydrOXYzine (ATARAX) 10 MG tablet Take 10 mg by mouth every 6 hours       lidocaine (LIDODERM) 5 % patch Place 2 patches onto the  skin every 24 hours       nystatin (MYCOSTATIN) 164425 UNIT/GM external cream Apply topically 2 times daily       ondansetron (ZOFRAN-ODT) 4 MG ODT tab Take 1 tablet (4 mg) by mouth every 6 hours as needed for nausea or vomiting 30 tablet 0     penicillin V (VEETID) 250 MG tablet Take 250 mg by mouth every 6 hours For 5 days.       polyethylene glycol (MIRALAX/GLYCOLAX) packet Take 17 g by mouth daily       Rivaroxaban (XARELTO PO) Take 20 mg by mouth daily       senna-docusate (SENOKOT-S/PERICOLACE) 8.6-50 MG tablet Take 1 tablet by mouth 2 times daily 30 tablet 0     TORSEMIDE PO Take 20 mg by mouth daily       Medications reviewed:  Medications reconciled to facility chart and changes were made to reflect current medications as identified as above med list. Below are the changes that were made:   Medications stopped since last EPIC medication reconciliation:   There are no discontinued medications.    Medications started since last [facility] medication reconciliation:  No orders of the defined types were placed in this encounter.      REVIEW OF SYSTEMS:  4 point ROS including Respiratory, CV, GI and , other than that noted in the HPI,  is negative    Physical Exam:  /56   Pulse 67   Temp 99.6  F (37.6  C)   Resp 16   Ht 1.829 m (6')   Wt 106.1 kg (234 lb)   SpO2 91%   BMI 31.74 kg/m    GENERAL APPEARANCE:  Alert, in no distress  ENT:  Mouth and posterior oropharynx normal, moist mucous membranes, hearing acuity intact  EYES:  EOM, conjunctivae, lids, pupils and irises normal  NECK:  No adenopathy,masses or thyromegaly  RESP:  respiratory effort normal, no respiratory distress, Lung sounds clear throughout  CV:  rate and rhythm irregularly irregular rhythm (chronic a-fib), Grade 3 systolic murmur best heard over the RUSB, but present throughout, no rub or gallop, Edema 2+ in the right foot and 2+ in the entire right leg. Lymphedema wrap in place.   ABDOMEN:  normal bowel sounds, soft, nontender, no  hepatosplenomegaly or other masses; distended r/t ascites  M/S:   Gait and station not observed, Digits and nails with mild thickening, no clubbing  SKIN: extensive bruising around the right hip, non-blanchable, incision with scant dried bloody drainage and sanguinous drainage on bandages, no expanding erythema or edema around incision. Left stump with approximately 4 cm x 2 cm crusting fissure - no expanding erythema or edema, mild ecchymosis near stump, blanchable.   NEURO: 2-12 in normal limits and at patient's baseline  PSYCH:  Insight, judgement, and memory intact, affect and mood normal    Recent Labs:  CBC RESULTS:   Recent Labs   Lab Test 12/07/18  0700 12/05/18  0729  12/02/18  0652   WBC 7.4  --   --  6.2   RBC 2.93*  --   --  2.59*   HGB 9.0* 8.8*   < > 8.1*   HCT 26.6*  --   --  23.9*   MCV 91  --   --  92   MCH 30.7  --   --  31.3   MCHC 33.8  --   --  33.9   RDW 16.5*  --   --  16.6*    125*  --  112*    < > = values in this interval not displayed.       Last Basic Metabolic Panel:  Recent Labs   Lab Test 12/11/18  1311 12/07/18  0700    137   POTASSIUM 4.3 3.8   CHLORIDE 101 101   BENNIE 8.6 8.8   CO2 28 28   BUN 42* 36*   CR 1.42* 1.23   * 81       Liver Function Studies -   Recent Labs   Lab Test 12/11/18  1311 12/07/18  0700   PROTTOTAL 6.7* 6.2*   ALBUMIN 2.7* 2.6*   BILITOTAL 2.2* 3.0*   ALKPHOS 95 72   AST 38 26   ALT 13 9       TSH   Date Value Ref Range Status   10/31/2018 0.86 0.40 - 4.00 mU/L Final   10/31/2018 Canceled, Test credited, specimen discarded 0.40 - 4.00 mU/L Final     Comment:     Unsatisfactory specimen - hemolyzed   ]    Lab Results   Component Value Date    A1C 4.6 05/18/2018       Assessment/Plan:  Malaise:   Comment; patient is more confused, lethargic. Low grade temp concern for infection vs other metabolic issues  Plan: send to ED for evaluation    (Z96.641) Status post total replacement of right hip  (primary encounter diagnosis)  (M16.11) Osteoarthritis  of right hip, unspecified osteoarthritis type  (R53.81) Physical deconditioning  (K59.00) Constipation  Comment: POD #16 s/p right TKA. Recent reduction in dilaudid.  We did have post op xray here yesterday- reviewed by yenny Cardona.     (N30.00) Acute cystitis without hematuria  Comment: UA w/10 WBCs, small leukocytes - cx positive for >100,000 colonies/mL of enterococcus faecalis. Afebrile since; asymptomatic.   Plan: Penicillin 250 mg QID x 5 days (last dose 12/14/18).     (Z89.512) History of left below knee amputation (H)  Comment: 4 cm x 2 cm fissure with brown crusting, no expanding erythema, edema or increased pain.   Plan: Plan of care for left stump: 2x/ day   1. Clean with gentle soap and water, dry and dry again.  Make sure is dry before putting on prosthetic..  2. Apply lotion down to the very distal aspect of the stump but not on the very bottom/ distal aspect- want that to become dry and callused  3. Apply very small dab of Vaseline just where the fissure is.  Goal is to keep that just a bit moist so it can heal but not moisten the surround callus  4. Cover distal stump with one, dry 4x4 then a 4x4 that is opened and conformed to the stump  5. Wear a washable sleeve  6. Keep rubbery prosthetic clean    (I25.5) Ischemic cardiomyopathy  Comment: EF 35-40%  Plan: Torsemide 20 mg daily - monitor RLE edema and HF. Follow-up with cardiologist Dr. Downs 12/19 at 10:45 am.    (D68.61) Antiphospholipid antibody syndrome (H)  Comment: Hx of PE; on chronic anticoagulant.   Plan: Continue Xarelto and baby aspirin.     (I10) Benign essential hypertension  (I48.0) Paroxysmal A-fib  (I77.810) Aortic root dilatation (H)  Comment: BPs 100's-120's/50's-70's; HR 50's-70's  Plan: Continue Coreg.     (F10.10) Alcohol abuse  (K70.31) Alcoholic cirrhosis of liver with ascites (H)  Comment: no signs of alcohol withdrawal; patient's abdomen is taught r/t fluid/ascites. Used to do paracentesis every 2 weeks in [place]; states not as  frequently here has he needs new orders every time. Last paracentesis 10/31/18. Bilirubin 3.0 on 12/7/18 (baseline in hospital < 1.0); asymptomatic aside from jaundice.   Plan: Paracentesis delayed as INR 3.82. Pt should follow up with Dr Viral ANDINO. Unable to be seen until early January.     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   Plan: last seen 10/25/18 - follow-up in 6 months (April 2019).         Electronically signed by  DIPAK Huggins CNP

## 2018-12-13 NOTE — LETTER
12/13/2018        RE: Antonio Ramirez  [address]  [place] [address]            [facility]    No chief complaint on file.      [facility] Medical Record Number:  [id number]  Place of Service where encounter took place:  No question data found.    HPI:    Antonio Ramirez is a 73 year old  (1945), who is being seen today for an episodic care visit.  HPI information obtained from: patient report, [facility] chart review and family/first contact [place] report.  Current issues are:    PMH and summary of most recent hospitalization below:         Status post total replacement of right hip  Osteoarthritis of right hip, unspecified osteoarthritis type  Physical deconditioning  Hx of left below-the-knee amputation  Patient underwent right total hip arthroplasty at [facility] (primary surgeon Dr. Howard) on 11/27/18 d/t hx of osteoarthritis of the right hip. EBL 1200 mL - required 3 units PRBCs inpatient (11/29, 12/1 & 12/5). Dose of Venofer given 12/5 for possible iron-deficiency anemia. Prophy antibiotics x 24-hours post-op. Pain moderately controlled with PO Dilaudid (had been receiving 4 mg doses when experienced Syncope inpatient; now down to 2 mg with reports of poor pain control). Episode of ANSELMO - creatinine up to 2.09, estimated GFR 31 - improved to creatinine of 1.23, estimated GFR 57 12/4/18.     To note, patient previously had a left total arthroplasty and left below-the-knee amputation approximately 5 years ago in [place]. 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 It was noticed during hospitalization that there was a fissure on the left stump and concern for proper wound care (i.e. malodorous, no sock between prosthesis & skin, etc.). Wound care orders from hospital in plan below.      Ischemic cardiomyopathy  Antiphospholipid antibody syndrome (H)  Paroxysmal  atrial fibrillation (H)  Benign essential hypertension  Aortic Root Dilation  Echo 7/11/18 - EF 40-45% with anterior, septal & apical wall akinesis  Patient anticoagulated with chronic Xarelto d/t history of PE presumably related to his antiphospholipid antibody syndrome - held during immediately prior to and during surgery; restarted POD #1.   Coreg for chronic A-Fib.   Syncopal episode w/LOC during hospitalization (11/29/18 & 11/30/18) - etiology multifactorial (pain meds vs. vagal response vs. known aortic stenosis and aortic root dilation vs. Pulmonary hypertension vs. ?); recovered with fluids and RBCs.   Restarted Torsemide 12/2/18; US 12/3 to r/o DVT - recommend compression stockings.     Alcohol abuse  Possible alcoholic cirrhosis of the liver w/ascites  Patient reports h/o alcohol abuse, stopping in 2015 but ETOH level from 10/31/18 was 0.10. Family reports 40+ years of heavy drinking.   Possible alcoholic cirrhosis w/ascites although this was no mentioned in CT imaging; had a paracentesis 10/30/18, 3.3L removed     Malignant Lymphoplasmacytic Lymphoma  Diagnosed via BMBX - no evidence of progression in BMBX September 2018. Followed by Dr. Raymond at [facility]    ALLERGIES: Ciprofloxacin and Hmg-coa-r inhibitors  Past Medical, Surgical, Family and Social History reviewed and updated in [facility].    Current Outpatient Medications   Medication Sig Dispense Refill     aspirin 81 MG tablet Take 81 mg by mouth daily       carvedilol (COREG) 3.125 MG tablet Take 1 tablet (3.125 mg) by mouth 2 times daily (with meals) 180 tablet 1     fluticasone (FLONASE) 50 MCG/ACT spray Spray 1 spray into both nostrils daily       GABAPENTIN PO Take 600 mg by mouth 2 times daily       HYDROmorphone (DILAUDID) 2 MG tablet Take 2 mg by mouth every 6 hours        hydrOXYzine (ATARAX) 10 MG tablet Take 10 mg by mouth every 6 hours       lidocaine (LIDODERM) 5 % patch Place 2 patches onto the skin every 24 hours       nystatin  (MYCOSTATIN) 247135 UNIT/GM external cream Apply topically 2 times daily       ondansetron (ZOFRAN-ODT) 4 MG ODT tab Take 1 tablet (4 mg) by mouth every 6 hours as needed for nausea or vomiting 30 tablet 0     penicillin V (VEETID) 250 MG tablet Take 250 mg by mouth every 6 hours For 5 days.       polyethylene glycol (MIRALAX/GLYCOLAX) packet Take 17 g by mouth daily       Rivaroxaban (XARELTO PO) Take 20 mg by mouth daily       senna-docusate (SENOKOT-S/PERICOLACE) 8.6-50 MG tablet Take 1 tablet by mouth 2 times daily 30 tablet 0     TORSEMIDE PO Take 20 mg by mouth daily       Medications reviewed:  Medications reconciled to facility chart and changes were made to reflect current medications as identified as above med list. Below are the changes that were made:   Medications stopped since last EPIC medication reconciliation:   There are no discontinued medications.    Medications started since last [facility] medication reconciliation:  No orders of the defined types were placed in this encounter.      REVIEW OF SYSTEMS:  4 point ROS including Respiratory, CV, GI and , other than that noted in the HPI,  is negative    Physical Exam:  There were no vitals taken for this visit.  GENERAL APPEARANCE:  Alert, in no distress  ENT:  Mouth and posterior oropharynx normal, moist mucous membranes, hearing acuity intact  EYES:  EOM, conjunctivae, lids, pupils and irises normal  NECK:  No adenopathy,masses or thyromegaly  RESP:  respiratory effort normal, no respiratory distress, Lung sounds clear throughout  CV:  rate and rhythm irregularly irregular rhythm (chronic a-fib), Grade 3 systolic murmur best heard over the RUSB, but present throughout, no rub or gallop, Edema 2+ in the right foot and 2+ in the entire right leg. Lymphedema wrap in place.   ABDOMEN:  normal bowel sounds, soft, nontender, no hepatosplenomegaly or other masses; distended r/t ascites  M/S:   Gait and station not observed, Digits and nails with mild  thickening, no clubbing  SKIN: extensive bruising around the right hip, non-blanchable, incision with scant dried bloody drainage and sanguinous drainage on bandages, no expanding erythema or edema around incision. Left stump with approximately 4 cm x 2 cm crusting fissure - no expanding erythema or edema, mild ecchymosis near stump, blanchable.   NEURO: 2-12 in normal limits and at patient's baseline  PSYCH:  Insight, judgement, and memory intact, affect and mood normal    Recent Labs:  CBC RESULTS:   Recent Labs   Lab Test 12/07/18  0700 12/05/18  0729  12/02/18  0652   WBC 7.4  --   --  6.2   RBC 2.93*  --   --  2.59*   HGB 9.0* 8.8*   < > 8.1*   HCT 26.6*  --   --  23.9*   MCV 91  --   --  92   MCH 30.7  --   --  31.3   MCHC 33.8  --   --  33.9   RDW 16.5*  --   --  16.6*    125*  --  112*    < > = values in this interval not displayed.       Last Basic Metabolic Panel:  Recent Labs   Lab Test 12/11/18  1311 12/07/18  0700    137   POTASSIUM 4.3 3.8   CHLORIDE 101 101   BENNIE 8.6 8.8   CO2 28 28   BUN 42* 36*   CR 1.42* 1.23   * 81       Liver Function Studies -   Recent Labs   Lab Test 12/11/18  1311 12/07/18  0700   PROTTOTAL 6.7* 6.2*   ALBUMIN 2.7* 2.6*   BILITOTAL 2.2* 3.0*   ALKPHOS 95 72   AST 38 26   ALT 13 9       TSH   Date Value Ref Range Status   10/31/2018 0.86 0.40 - 4.00 mU/L Final   10/31/2018 Canceled, Test credited, specimen discarded 0.40 - 4.00 mU/L Final     Comment:     Unsatisfactory specimen - hemolyzed   ]    Lab Results   Component Value Date    A1C 4.6 05/18/2018       Assessment/Plan:  (Z96.641) Status post total replacement of right hip  (primary encounter diagnosis)  (M16.11) Osteoarthritis of right hip, unspecified osteoarthritis type  (R53.81) Physical deconditioning  (K59.00) Constipation  Comment: POD #15 s/p right TKA. Baseline creatinine 0.92 - 1.20 (estimated GFR 59 11/8/18 c/w stage III CKD), CMP 12/7 with stable creatinine at 1.23., denies abdominal pain.  Patient states pain is a 0, but is very reluctant to tapering of opioids (see narrative above). Working well with PT and OT. Patient missed ortho follow up.   Plan: continue Taper Dilaudid to q6h and Hydroxyzine q6h PRN; Lidocaine patches as scheduled. Ok to remove and replace outer dressing; DO NO touch prineo (mesh) inner dressing. Educated patient on the importance of ice post-op to help with inflammation. Avoid NSAIDs and Acetaminophen with kidney and liver impairment. Continue Miralax w/Senna BID for bowel function; escalate if needed. Aggressive PT & OT as tolerated; recommend Vanilla/Boost ensures TID for nutrition (patient prefers Ensures and drinks these at home).  Post op xrays here today. KINGS MCDANIEL to review.     (N30.00) Acute cystitis without hematuria  Comment: UA w/10 WBCs, small leukocytes - cx positive for >100,000 colonies/mL of enterococcus faecalis. Afebrile since; asymptomatic.   Plan: Penicillin 250 mg QID x 5 days (last dose 12/14/18).     (Z89.512) History of left below knee amputation (H)  Comment: 4 cm x 2 cm fissure with brown crusting, no expanding erythema, edema or increased pain.   Plan: Plan of care for left stump: 2x/ day   1. Clean with gentle soap and water, dry and dry again.  Make sure is dry before putting on prosthetic..  2. Apply lotion down to the very distal aspect of the stump but not on the very bottom/ distal aspect- want that to become dry and callused  3. Apply very small dab of Vaseline just where the fissure is.  Goal is to keep that just a bit moist so it can heal but not moisten the surround callus  4. Cover distal stump with one, dry 4x4 then a 4x4 that is opened and conformed to the stump  5. Wear a washable sleeve  6. Keep rubbery prosthetic clean    (I25.5) Ischemic cardiomyopathy  Comment: EF 35-40%  Plan: Torsemide 20 mg daily - monitor RLE edema and HF. Follow-up with cardiologist Dr. Downs 12/19 at 10:45 am.    (D68.61) Antiphospholipid antibody syndrome  (H)  Comment: Hx of PE; on chronic anticoagulant.   Plan: Continue Xarelto and baby aspirin.     (I10) Benign essential hypertension  (I48.0) Paroxysmal A-fib  (I77.810) Aortic root dilatation (H)  Comment: BPs 100's-120's/50's-70's; HR 50's-70's  Plan: Continue Coreg.     (F10.10) Alcohol abuse  (K70.31) Alcoholic cirrhosis of liver with ascites (H)  Comment: no signs of alcohol withdrawal; patient's abdomen is taught r/t fluid/ascites. Used to do paracentesis every 2 weeks in [place]; states not as frequently here has he needs new orders every time. Last paracentesis 10/31/18. Bilirubin 3.0 on 12/7/18 (baseline in hospital < 1.0); asymptomatic aside from jaundice.   Plan: Paracentesis delayed as INR 3.82. Pt should follow up with Dr Viral ANDINO.     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   Plan: last seen 10/25/18 - follow-up in 6 months (April 2019).         Electronically signed by  Annabelle Frye MA                    [facility]    Chief Complaint   Patient presents with     RECHECK       [facility] Medical Record Number:  [id number]  Place of Service where encounter took place[facility] [[id number]]    HPI:    Antonio Ramirez is a 73 year old  (1945), who is being seen today for an episodic care visit.  HPI information obtained from: patient report, [facility] chart review and family/first contact [place] report.  Current issues are:    PMH and summary of most recent hospitalization below:         Status post total replacement of right hip  Osteoarthritis of right hip, unspecified osteoarthritis type  Physical deconditioning  Hx of left below-the-knee amputation  Patient underwent right total hip arthroplasty at [facility] (primary surgeon Dr. Howard) on 11/27/18 d/t hx of osteoarthritis of the right hip. EBL 1200 mL - required 3 units  PRBCs inpatient (11/29, 12/1 & 12/5). Dose of Venofer given 12/5 for possible iron-deficiency anemia. Prophy antibiotics x 24-hours post-op. Pain moderately controlled with PO Dilaudid (had been receiving 4 mg doses when experienced Syncope inpatient; now down to 2 mg with reports of poor pain control). Episode of ANSELMO - creatinine up to 2.09, estimated GFR 31 - improved to creatinine of 1.23, estimated GFR 57 12/4/18.     To note, patient previously had a left total arthroplasty and left below-the-knee amputation approximately 5 years ago in [place]. 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 It was noticed during hospitalization that there was a fissure on the left stump and concern for proper wound care (i.e. malodorous, no sock between prosthesis & skin, etc.). Wound care orders from hospital in plan below.      Ischemic cardiomyopathy  Antiphospholipid antibody syndrome (H)  Paroxysmal atrial fibrillation (H)  Benign essential hypertension  Aortic Root Dilation  Echo 7/11/18 - EF 40-45% with anterior, septal & apical wall akinesis  Patient anticoagulated with chronic Xarelto d/t history of PE presumably related to his antiphospholipid antibody syndrome - held during immediately prior to and during surgery; restarted POD #1.   Coreg for chronic A-Fib.   Syncopal episode w/LOC during hospitalization (11/29/18 & 11/30/18) - etiology multifactorial (pain meds vs. vagal response vs. known aortic stenosis and aortic root dilation vs. Pulmonary hypertension vs. ?); recovered with fluids and RBCs.   Restarted Torsemide 12/2/18; US 12/3 to r/o DVT - recommend compression stockings.     Alcohol abuse  Possible alcoholic cirrhosis of the liver w/ascites  Patient reports h/o alcohol abuse, stopping in 2015 but ETOH level from 10/31/18 was 0.10. Family reports 40+ years of heavy drinking.   Possible alcoholic cirrhosis  w/ascites although this was no mentioned in CT imaging; had a paracentesis 10/30/18, 3.3L removed   We did attempt to do paracentesis 12/11 but unable to do due to elevated INR 3.82  Malignant Lymphoplasmacytic Lymphoma  Diagnosed via BMBX - no evidence of progression in BMBX September 2018. Followed by Dr. Raymond at [facility]    ALLERGIES: Ciprofloxacin and Hmg-coa-r inhibitors  Past Medical, Surgical, Family and Social History reviewed and updated in [facility].    Current Outpatient Medications   Medication Sig Dispense Refill     aspirin 81 MG tablet Take 81 mg by mouth daily       carvedilol (COREG) 3.125 MG tablet Take 1 tablet (3.125 mg) by mouth 2 times daily (with meals) 180 tablet 1     fluticasone (FLONASE) 50 MCG/ACT spray Spray 1 spray into both nostrils daily       GABAPENTIN PO Take 600 mg by mouth 2 times daily       HYDROmorphone (DILAUDID) 2 MG tablet Take 2 mg by mouth every 6 hours        hydrOXYzine (ATARAX) 10 MG tablet Take 10 mg by mouth every 6 hours       lidocaine (LIDODERM) 5 % patch Place 2 patches onto the skin every 24 hours       nystatin (MYCOSTATIN) 440121 UNIT/GM external cream Apply topically 2 times daily       ondansetron (ZOFRAN-ODT) 4 MG ODT tab Take 1 tablet (4 mg) by mouth every 6 hours as needed for nausea or vomiting 30 tablet 0     penicillin V (VEETID) 250 MG tablet Take 250 mg by mouth every 6 hours For 5 days.       polyethylene glycol (MIRALAX/GLYCOLAX) packet Take 17 g by mouth daily       Rivaroxaban (XARELTO PO) Take 20 mg by mouth daily       senna-docusate (SENOKOT-S/PERICOLACE) 8.6-50 MG tablet Take 1 tablet by mouth 2 times daily 30 tablet 0     TORSEMIDE PO Take 20 mg by mouth daily       Medications reviewed:  Medications reconciled to facility chart and changes were made to reflect current medications as identified as above med list. Below are the changes that were made:   Medications stopped since last EPIC medication reconciliation:   There are no  discontinued medications.    Medications started since last [facility] medication reconciliation:  No orders of the defined types were placed in this encounter.      REVIEW OF SYSTEMS:  4 point ROS including Respiratory, CV, GI and , other than that noted in the HPI,  is negative    Physical Exam:  /56   Pulse 67   Temp 99.6  F (37.6  C)   Resp 16   Ht 1.829 m (6')   Wt 106.1 kg (234 lb)   SpO2 91%   BMI 31.74 kg/m     GENERAL APPEARANCE:  Alert, in no distress  ENT:  Mouth and posterior oropharynx normal, moist mucous membranes, hearing acuity intact  EYES:  EOM, conjunctivae, lids, pupils and irises normal  NECK:  No adenopathy,masses or thyromegaly  RESP:  respiratory effort normal, no respiratory distress, Lung sounds clear throughout  CV:  rate and rhythm irregularly irregular rhythm (chronic a-fib), Grade 3 systolic murmur best heard over the RUSB, but present throughout, no rub or gallop, Edema 2+ in the right foot and 2+ in the entire right leg. Lymphedema wrap in place.   ABDOMEN:  normal bowel sounds, soft, nontender, no hepatosplenomegaly or other masses; distended r/t ascites  M/S:   Gait and station not observed, Digits and nails with mild thickening, no clubbing  SKIN: extensive bruising around the right hip, non-blanchable, incision with scant dried bloody drainage and sanguinous drainage on bandages, no expanding erythema or edema around incision. Left stump with approximately 4 cm x 2 cm crusting fissure - no expanding erythema or edema, mild ecchymosis near stump, blanchable.   NEURO: 2-12 in normal limits and at patient's baseline  PSYCH:  Insight, judgement, and memory intact, affect and mood normal    Recent Labs:  CBC RESULTS:   Recent Labs   Lab Test 12/07/18  0700 12/05/18  0729  12/02/18  0652   WBC 7.4  --   --  6.2   RBC 2.93*  --   --  2.59*   HGB 9.0* 8.8*   < > 8.1*   HCT 26.6*  --   --  23.9*   MCV 91  --   --  92   MCH 30.7  --   --  31.3   MCHC 33.8  --   --  33.9   RDW  16.5*  --   --  16.6*    125*  --  112*    < > = values in this interval not displayed.       Last Basic Metabolic Panel:  Recent Labs   Lab Test 12/11/18  1311 12/07/18  0700    137   POTASSIUM 4.3 3.8   CHLORIDE 101 101   BENNIE 8.6 8.8   CO2 28 28   BUN 42* 36*   CR 1.42* 1.23   * 81       Liver Function Studies -   Recent Labs   Lab Test 12/11/18  1311 12/07/18  0700   PROTTOTAL 6.7* 6.2*   ALBUMIN 2.7* 2.6*   BILITOTAL 2.2* 3.0*   ALKPHOS 95 72   AST 38 26   ALT 13 9       TSH   Date Value Ref Range Status   10/31/2018 0.86 0.40 - 4.00 mU/L Final   10/31/2018 Canceled, Test credited, specimen discarded 0.40 - 4.00 mU/L Final     Comment:     Unsatisfactory specimen - hemolyzed   ]    Lab Results   Component Value Date    A1C 4.6 05/18/2018       Assessment/Plan:  Malaise:   Comment; patient is more confused, lethargic. Low grade temp concern for infection vs other metabolic issues  Plan: send to ED for evaluation    (Z96.641) Status post total replacement of right hip  (primary encounter diagnosis)  (M16.11) Osteoarthritis of right hip, unspecified osteoarthritis type  (R53.81) Physical deconditioning  (K59.00) Constipation  Comment: POD #16 s/p right TKA. Recent reduction in dilaudid.  We did have post op xray here yesterday- reviewed by yenny Cardona.     (N30.00) Acute cystitis without hematuria  Comment: UA w/10 WBCs, small leukocytes - cx positive for >100,000 colonies/mL of enterococcus faecalis. Afebrile since; asymptomatic.   Plan: Penicillin 250 mg QID x 5 days (last dose 12/14/18).     (Z89.512) History of left below knee amputation (H)  Comment: 4 cm x 2 cm fissure with brown crusting, no expanding erythema, edema or increased pain.   Plan: Plan of care for left stump: 2x/ day   1. Clean with gentle soap and water, dry and dry again.  Make sure is dry before putting on prosthetic..  2. Apply lotion down to the very distal aspect of the stump but not on the very bottom/ distal aspect- want  that to become dry and callused  3. Apply very small dab of Vaseline just where the fissure is.  Goal is to keep that just a bit moist so it can heal but not moisten the surround callus  4. Cover distal stump with one, dry 4x4 then a 4x4 that is opened and conformed to the stump  5. Wear a washable sleeve  6. Keep rubbery prosthetic clean    (I25.5) Ischemic cardiomyopathy  Comment: EF 35-40%  Plan: Torsemide 20 mg daily - monitor RLE edema and HF. Follow-up with cardiologist Dr. Downs 12/19 at 10:45 am.    (D68.61) Antiphospholipid antibody syndrome (H)  Comment: Hx of PE; on chronic anticoagulant.   Plan: Continue Xarelto and baby aspirin.     (I10) Benign essential hypertension  (I48.0) Paroxysmal A-fib  (I77.810) Aortic root dilatation (H)  Comment: BPs 100's-120's/50's-70's; HR 50's-70's  Plan: Continue Coreg.     (F10.10) Alcohol abuse  (K70.31) Alcoholic cirrhosis of liver with ascites (H)  Comment: no signs of alcohol withdrawal; patient's abdomen is taught r/t fluid/ascites. Used to do paracentesis every 2 weeks in [place]; states not as frequently here has he needs new orders every time. Last paracentesis 10/31/18. Bilirubin 3.0 on 12/7/18 (baseline in hospital < 1.0); asymptomatic aside from jaundice.   Plan: Paracentesis delayed as INR 3.82. Pt should follow up with Dr Viral ANDINO. Unable to be seen until early January.     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   Plan: last seen 10/25/18 - follow-up in 6 months (April 2019).         Electronically signed by  DIPAK Huggins CNP                  Sincerely,        DIPAK Huggins CNP

## 2018-12-13 NOTE — ED NOTES
Bed: ED09  Expected date:   Expected time:   Means of arrival:   Comments:  E2  73 M weakness  1232

## 2018-12-13 NOTE — ED PROVIDER NOTES
History     Chief Complaint:  Altered Mental Status     HPI   Antonio Ramirez is a 73 year old male with a complex medical history including alcoholism with cirrhosis and ascites with regularly scheduled paracenteses, ischemic cardiomyopathy, aortic stenosis, previous MI s/p CABG, paroxysmal atrial fibrillation, PE on Xarelto, prostate cancer, malignant lymphoplasmacytic lymphoma, s/p left leg amputation with previous infections, and s/p total right hip replacement on 11/27 currently at a rehab facility who presents to the emergency department today via EMS for evaluation of altered mental status. Per chart review, the patient recently had a right hip replacement on 11/27 with Dr. Howard and since has been at a rehab facility. Recently, he did have a UA that was positive for a UTI and was placed on Penicillin. Two days ago, he had his regularly scheduled paracentesis, but his doctor chose not to perform this as his INR was 3.82.Today, he seemed more tired, confused, and generally weak at his rehab facility with associated juandice. His care facility staff were concerned about his confusion, generalized weakness, and fatigue in addition to the increasing INR prompting a call to EMS and his arrival to the emergency department. At arrival, his wife notes that he has seemed more confused the past couple of days, but otherwise is at his baseline. She denies any other new complaints or concerns, shortness of breath, abdominal pain, .     Allergies:  Ciprofloxacin  Hmg-Coa-R Inhibitors    Medications:    aspirin 81 MG tablet  carvedilol (COREG) 3.125 MG tablet  fluticasone (FLONASE) 50 MCG/ACT spray  GABAPENTIN PO  HYDROmorphone (DILAUDID) 2 MG tablet  hydrOXYzine (ATARAX) 10 MG tablet  nystatin (MYCOSTATIN) 988729 UNIT/GM external cream  ondansetron (ZOFRAN-ODT) 4 MG ODT tab  polyethylene glycol (MIRALAX/GLYCOLAX) packet  Rivaroxaban (XARELTO PO)  senna-docusate (SENOKOT-S/PERICOLACE) 8.6-50 MG tablet  TORSEMIDE  PO  Penicillin    Past Medical History:    Alcoholism  Amputated left leg  Anemia  Anticardiolipin antibody syndrome  Antiphospholipid antibody syndrome  Aortic root dilation  Aortic stenosis  Arthritis  CAD  GERD  Heart attack  Hiatal hernia  HTN  Hyperlipidemia  Ischemic cardiomyopathy  Left foot drop  Liver disease  Low grade B cell lymphoproliferative disorder  Moderate mitral regurgitation  Monoclonal gammopathy  Neuropathy  Noninfectious ileitis  Nonrheumatic tricuspid valve regurgitation  Paroxysmal a-fib  PE  Prostate cancer  Renal disease  Subdural hematoma  Thrombocytopenia  Urethral stricture  Venous thrombosis  Prediabetes    Past Surgical History:    Amputate left leg below knee  Appendectomy  Apply wound vac  Knee arthroplasty  CABG  Cholecystectomy  Colonoscopy  Combined cystoscopy, retrogrades, exchange stent ureter(s)  Craniotomy  Cytoscopy dilate urethra  Cystoscopy remove stent(s), combined  Endoscopic vein stripping  Cataract surgery  Prostate seen implants  Umbilical hernia repair  Incision and drainage lower extremity, combined  IVC filter placement  Irrigation and debridement lower extremity, combined (x2)  Laser holmium lithotripsy ureter(s), insert stent, combined (x2)  Right knee scope  Total hip replacement  Elbow surgery    Family History:    Lung cancer  Heart failure    Social History:  The patient was accompanied to the ED by wife.  Smoking Status: Never  Smokeless Tobacco: Never  Alcohol Use: Yes, 3-4 vodka drinks/night  Marital Status:      Review of Systems   Constitutional: Positive for fatigue. Negative for fever.   Respiratory: Negative for shortness of breath.    Cardiovascular: Negative for chest pain.   Gastrointestinal: Negative for abdominal pain.   Neurological: Positive for weakness.   Psychiatric/Behavioral: Positive for confusion.   All other systems reviewed and are negative.      Physical Exam     Patient Vitals for the past 24 hrs:   BP Temp Temp src Pulse Resp  SpO2   12/13/18 1239 135/71 99.1  F (37.3  C) Oral 64 18 95 %         Physical Exam  Eye:  Pupils are equal, round, and reactive.  Extraocular movements intact.    ENT:  No rhinorrhea.  Moist mucus membranes.  Normal tongue and tonsil.    Cardiac:  Regular rate and rhythm.  No murmurs, gallops, or rubs.    Pulmonary:  Clear to auscultation bilaterally.  No wheezes, rales, or rhonchi.    Abdomen:  Positive bowel sounds.  Mild distention secondary to ascites without tenderness.     Musculoskeletal:  Normal movement of all extremities without evidence for deficit.  He is BKA on the left.    Skin:  Warm and dry without rashes.  Thorough investigation of his skin shows no sign of skin break down, ulceration, or cellulitis.    Neurologic:  Non-focal exam without asymmetric weakness or numbness.     Psychiatric:  Patient is rather somnolent, but awakens to voice and interacts appropriately when awake.     Emergency Department Course   Laboratory:  Laboratory findings were communicated with the patient and family who voiced understanding of the findings.  CBC: HGB 7.7 (L) o/w WNL. (WBC 6.1, )   CMP: AWNL (Creatinine 1.63 (H), glucose 119 (H), BUN 44 (H), GFR estimate 41 (L), bilirubin 2.3 (H), albumin 2.6 (L), protein 6.7 (L) AST 47 (H) o/w WNL  Ammonia: 44  UA: clear, yellow urine, bacteria few o/w WNL    Interventions:  1325 NS Bolus 1,000mL IV    Emergency Department Course:  Nursing notes and vitals reviewed.  1257: I performed an exam of the patient as documented above.   1404: Patient rechecked and updated.   1444: I spoke Oksana Najera NP at his care facility with regarding patient's presentation, findings, and plan of care.  Findings and plan explained to the Patient and spouse. Patient discharged home with instructions regarding supportive care, medications, and reasons to return. The importance of close follow-up was reviewed.  I personally reviewed the laboratory results with the Patient and spouse and  "answered all related questions prior to discharge.    Impression & Plan    Medical Decision Making:  This unfortunate 73-year-old man with a recent admission to the hospital for a hip fracture status post repair presents from his rehab center with concerns for continued somnolence, confusion, and assessment for possible liver  failure.  The patient has a long history of alcoholism with known cirrhosis, typically undergoing paracentesis approximately once or twice per month.  He is anticoagulated with a novel oral anticoagulant.  Since he has arrived at his rehab center (approximately 1 week), there have been concerted efforts to decrease his pain medication regimen.  Of note, he had issues with somnolence and syncope while in the hospital and had received 2 units of packed red blood cells for what was thought to be symptomatic anemia.  On his assessment by the rounding nurse practitioner at the rehab center today, she was concerned that he had a low-grade temperature along with continued sleepiness.  She also noted that his paracentesis was canceled earlier in the week because of an elevated INR, though this was drawn after he had received his anticoagulation medication earlier in the day.  Furthermore, his bilirubin went up after he received 2 units of packed red blood cells and has continued to be mildly elevated.  With all of these issues, he was sent to us for further assessment.    On my assessment, the patient is rather sleepy, but he awakens to verbal stimuli.  He has absolutely no complaints, simply stating \"they jumped the gun on sending me here and I feel fine.\"  In speaking with his wife, she concurs that he has been sleepy and at times confused, though she notes this is not atypical for after he undergoes surgery.  She does not believe that he is any worse today than what he was yesterday or the day before.  However, she also voices concerns of possible worsening liver failure and is very cooperative and " helpful at the bedside.    Patient's physical exam is unremarkable other than his sleepiness and his pale state.  A laboratory investigation is generally reassuring.  He does not show any significant elevation of his liver enzymes.  His bilirubin continues to be mildly elevated at 2.2, trending down from 3 while in the hospital.  He does not have an elevated ammonia level.  When we asked him to provide a urine sample, he was able to utilize a bedside container without assistance and provide this for us without difficulty.  Later in my assessment, the patient seems to be more awake than he had been earlier and wife feels very reassured knowing that his numbers are no worse.  I did note that his hemoglobin has trended back down to 7.7, though I do not know that that is germane to his issues today.  With his elevated INR after receiving Eliquis earlier in the day, I do not believe that this has anything to do with overt liver failure and that if he requires paracentesis in the future, this anticoagulant should simply be held.  I personally rechecked his temperature at the bedside and found it to be 99.1.  His abdomen is benign and I do not believe that he is suffering from spontaneous bacterial peritonitis or other overt bacterial infection.  His white blood cell count is normal.    With all of this, I spoke with Ms. Oksana Najera, the nurse practitioner from his facility.  I explained to her that she should never hesitate to return him to us, as he certainly has potential to worsen.  However, I feel that he does not require admission to the hospital at this time and she feels comfortable having him return to the facility.  The patient was informed of this and he is pleased not to be admitted and is comfortable returning back to rehab.  There will be continued efforts to decrease his narcotics as I believe this certainly may be playing a role.  His hemoglobin will also be watched carefully.    Diagnosis:    ICD-10-CM     1. Somnolence R40.0    2. Postoperative state Z98.890        Disposition:  discharged to home    Scribe Disclosure:  I, Dixon Chela, am serving as a scribe at 12:57 PM on 12/13/2018 to document services personally performed by Trierweiler, Chad A, MD based on my observations and the provider's statements to me.     12/13/2018    EMERGENCY DEPARTMENT       Trierweiler, Chad A, MD  12/13/18 6961

## 2018-12-14 ENCOUNTER — PATIENT OUTREACH (OUTPATIENT)
Dept: CARE COORDINATION | Facility: CLINIC | Age: 73
End: 2018-12-14

## 2018-12-14 ENCOUNTER — NURSING HOME VISIT (OUTPATIENT)
Dept: GERIATRICS | Facility: CLINIC | Age: 73
End: 2018-12-14
Payer: MEDICARE

## 2018-12-14 VITALS
DIASTOLIC BLOOD PRESSURE: 56 MMHG | BODY MASS INDEX: 31.69 KG/M2 | HEART RATE: 67 BPM | OXYGEN SATURATION: 91 % | RESPIRATION RATE: 16 BRPM | TEMPERATURE: 99.6 F | WEIGHT: 234 LBS | HEIGHT: 72 IN | SYSTOLIC BLOOD PRESSURE: 125 MMHG

## 2018-12-14 DIAGNOSIS — C83.00 MALIGNANT LYMPHOPLASMACYTIC LYMPHOMA (H): ICD-10-CM

## 2018-12-14 DIAGNOSIS — K70.31 ALCOHOLIC CIRRHOSIS OF LIVER WITH ASCITES (H): ICD-10-CM

## 2018-12-14 DIAGNOSIS — Z89.512 HISTORY OF LEFT BELOW KNEE AMPUTATION (H): ICD-10-CM

## 2018-12-14 DIAGNOSIS — I25.5 ISCHEMIC CARDIOMYOPATHY: ICD-10-CM

## 2018-12-14 DIAGNOSIS — F10.10 ALCOHOL ABUSE: ICD-10-CM

## 2018-12-14 DIAGNOSIS — D68.61 ANTIPHOSPHOLIPID ANTIBODY SYNDROME (H): ICD-10-CM

## 2018-12-14 DIAGNOSIS — Z96.641 STATUS POST TOTAL REPLACEMENT OF RIGHT HIP: ICD-10-CM

## 2018-12-14 DIAGNOSIS — I10 BENIGN ESSENTIAL HYPERTENSION: ICD-10-CM

## 2018-12-14 DIAGNOSIS — I77.810 AORTIC ROOT DILATATION (H): ICD-10-CM

## 2018-12-14 DIAGNOSIS — M16.11 OSTEOARTHRITIS OF RIGHT HIP, UNSPECIFIED OSTEOARTHRITIS TYPE: ICD-10-CM

## 2018-12-14 DIAGNOSIS — R53.81 PHYSICAL DECONDITIONING: ICD-10-CM

## 2018-12-14 DIAGNOSIS — R53.81 MALAISE: Primary | ICD-10-CM

## 2018-12-14 PROCEDURE — 99310 SBSQ NF CARE HIGH MDM 45: CPT | Performed by: NURSE PRACTITIONER

## 2018-12-14 ASSESSMENT — MIFFLIN-ST. JEOR: SCORE: 1834.42

## 2018-12-14 NOTE — LETTER
12/14/2018        RE: Antonio Ramirez  [address]  [place] [address]        [facility]    Chief Complaint   Patient presents with     RECHECK       [facility] Record Number:  [id number]  Place of Service where encounter took place:  [facility] [[id number]]    HPI:    Antonio Ramirez is a 73 year old  (1945), who is being seen today for an episodic care visit.  HPI information obtained from: patient report, [facility] chart review and family/first contact [place] report.  Current issues are:    PMH and summary of most recent hospitalization below:       Patient sent to emergency department yesterday due to malaise, increased confusion, weakness. Work up was negative for hepatic encephalopathy or other metabolic disturbance. It is thought that increased malaise related to dilaudid.     Status post total replacement of right hip  Osteoarthritis of right hip, unspecified osteoarthritis type  Physical deconditioning  Hx of left below-the-knee amputation  Patient underwent right total hip arthroplasty at [facility] (primary surgeon Dr. Howard) on 11/27/18 d/t hx of osteoarthritis of the right hip. EBL 1200 mL - required 3 units PRBCs inpatient (11/29, 12/1 & 12/5). Dose of Venofer given 12/5 for possible iron-deficiency anemia. Prophy antibiotics x 24-hours post-op. Pain moderately controlled with PO Dilaudid (had been receiving 4 mg doses when experienced Syncope inpatient; now down to 2 mg with reports of poor pain control). Episode of ANSELMO - creatinine up to 2.09, estimated GFR 31 - improved to creatinine of 1.23, estimated GFR 57 12/4/18.     To note, patient previously had a left total arthroplasty and left below-the-knee amputation approximately 5 years ago in [place]. 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 It was noticed during  hospitalization that there was a fissure on the left stump and concern for proper wound care (i.e. malodorous, no sock between prosthesis & skin, etc.). Wound care orders from hospital in plan below.      Ischemic cardiomyopathy  Antiphospholipid antibody syndrome (H)  Paroxysmal atrial fibrillation (H)  Benign essential hypertension  Aortic Root Dilation  Echo 7/11/18 - EF 40-45% with anterior, septal & apical wall akinesis  Patient anticoagulated with chronic Xarelto d/t history of PE presumably related to his antiphospholipid antibody syndrome - held during immediately prior to and during surgery; restarted POD #1.   Coreg for chronic A-Fib.   Syncopal episode w/LOC during hospitalization (11/29/18 & 11/30/18) - etiology multifactorial (pain meds vs. vagal response vs. known aortic stenosis and aortic root dilation vs. Pulmonary hypertension vs. ?); recovered with fluids and RBCs.   Restarted Torsemide 12/2/18; US 12/3 to r/o DVT - recommend compression stockings.     Alcohol abuse  Possible alcoholic cirrhosis of the liver w/ascites  Patient reports h/o alcohol abuse, stopping in 2015 but ETOH level from 10/31/18 was 0.10. Family reports 40+ years of heavy drinking.   Possible alcoholic cirrhosis w/ascites although this was no mentioned in CT imaging; had a paracentesis 10/30/18, 3.3L removed   We did attempt to do paracentesis 12/11 but unable to do due to elevated INR 3.82  During most recent trip to emergency department ammonia level 44.   Malignant Lymphoplasmacytic Lymphoma  Diagnosed via [facility]X - no evidence of progression in [facility] September 2018. Followed by Dr. Raymond at [facility]. I did discuss elevated INR with Dr Raymond. See below    ALLERGIES: Ciprofloxacin and Hmg-coa-r inhibitors  Past Medical, Surgical, Family and Social History reviewed and updated in [facility].    Current Outpatient Medications   Medication Sig Dispense Refill     aspirin 81 MG tablet Take 81 mg by mouth daily       carvedilol  (COREG) 3.125 MG tablet Take 1 tablet (3.125 mg) by mouth 2 times daily (with meals) 180 tablet 1     fluticasone (FLONASE) 50 MCG/ACT spray Spray 1 spray into both nostrils daily       GABAPENTIN PO Take 600 mg by mouth 2 times daily       HYDROmorphone (DILAUDID) 2 MG tablet Take 2 mg by mouth every 8 hours       hydrOXYzine (ATARAX) 10 MG tablet Take 10 mg by mouth every 6 hours       lidocaine (LIDODERM) 5 % patch Place 2 patches onto the skin every 24 hours       nystatin (MYCOSTATIN) 233112 UNIT/GM external cream Apply topically 2 times daily       ondansetron (ZOFRAN-ODT) 4 MG ODT tab Take 1 tablet (4 mg) by mouth every 6 hours as needed for nausea or vomiting 30 tablet 0     penicillin V (VEETID) 250 MG tablet Take 250 mg by mouth every 6 hours For 5 days.       polyethylene glycol (MIRALAX/GLYCOLAX) packet Take 17 g by mouth daily       Rivaroxaban (XARELTO PO) Take 20 mg by mouth daily       senna-docusate (SENOKOT-S/PERICOLACE) 8.6-50 MG tablet Take 1 tablet by mouth 2 times daily 30 tablet 0     TORSEMIDE PO Take 20 mg by mouth daily       Medications reviewed:  Medications reconciled to facility chart and changes were made to reflect current medications as identified as above med list. Below are the changes that were made:   Medications stopped since last EPIC medication reconciliation:   There are no discontinued medications.    Medications started since last [facility] medication reconciliation:  No orders of the defined types were placed in this encounter.      REVIEW OF SYSTEMS:  4 point ROS including Respiratory, CV, GI and , other than that noted in the HPI,  is negative    Physical Exam:  /56   Pulse 67   Temp 99.6  F (37.6  C)   Resp 16   Ht 1.829 m (6')   Wt 106.1 kg (234 lb)   SpO2 91%   BMI 31.74 kg/m     GENERAL APPEARANCE:  Alert, in no distress  ENT:  Mouth and posterior oropharynx normal, moist mucous membranes, hearing acuity intact  EYES:  EOM, conjunctivae, lids, pupils and  irises normal  NECK:  No adenopathy,masses or thyromegaly  RESP:  respiratory effort normal, no respiratory distress, Lung sounds clear throughout  CV:  rate and rhythm irregularly irregular rhythm (chronic a-fib), Grade 3 systolic murmur best heard over the RUSB, but present throughout, no rub or gallop, Edema 2+ in the right foot and 2+ in the entire right leg. Lymphedema wrap in place.   ABDOMEN:  normal bowel sounds, soft, nontender, no hepatosplenomegaly or other masses; distended r/t ascites  M/S:   Gait and station not observed, Digits and nails with mild thickening, no clubbing  SKIN: extensive bruising around the right hip, non-blanchable, incision with scant dried bloody drainage and sanguinous drainage on bandages, no expanding erythema or edema around incision. Left stump with approximately 4 cm x 2 cm crusting fissure - no expanding erythema or edema, mild ecchymosis near stump, blanchable.   NEURO: 2-12 in normal limits and at patient's baseline  PSYCH:  Insight, judgement, and memory intact, affect and mood normal    Recent Labs:  CBC RESULTS:   Recent Labs   Lab Test 12/13/18  1247 12/07/18  0700   WBC 6.1 7.4   RBC 2.48* 2.93*   HGB 7.7* 9.0*   HCT 22.9* 26.6*   MCV 92 91   MCH 31.0 30.7   MCHC 33.6 33.8   RDW 17.1* 16.5*    190       Last Basic Metabolic Panel:  Recent Labs   Lab Test 12/13/18  1247 12/11/18  1311    137   POTASSIUM 4.6 4.3   CHLORIDE 101 101   BENNIE 8.5 8.6   CO2 29 28   BUN 44* 42*   CR 1.63* 1.42*   * 109*       Liver Function Studies -   Recent Labs   Lab Test 12/13/18  1247 12/11/18  1311   PROTTOTAL 6.7* 6.7*   ALBUMIN 2.6* 2.7*   BILITOTAL 2.3* 2.2*   ALKPHOS 106 95   AST 47* 38   ALT 16 13       TSH   Date Value Ref Range Status   10/31/2018 0.86 0.40 - 4.00 mU/L Final   10/31/2018 Canceled, Test credited, specimen discarded 0.40 - 4.00 mU/L Final     Comment:     Unsatisfactory specimen - hemolyzed   ]    Lab Results   Component Value Date    A1C 4.6  05/18/2018         Assessment/Plan:  Malaise:   Comment; patient sent to ED yesterday due to increased confusion and malaise. Work up in ED was negative for anything acute. He is more alert today and following directions.   Plan: reduce dilaudid 2mg TID then to BID on 12/17    (Z96.641) Status post total replacement of right hip  (primary encounter diagnosis)  (M16.11) Osteoarthritis of right hip, unspecified osteoarthritis type  (R53.81) Physical deconditioning  (K59.00) Constipation  Comment: POD #16 s/p right TKA. Recent reduction in dilaudid.  We did have post op xray here yesterday- reviewed by yenny Cardona.   Goal is to return home when he has made progess with therapy  Plan: physical therapy     (N30.00) Acute cystitis without hematuria  Comment: UA w/10 WBCs, small leukocytes - cx positive for >100,000 colonies/mL of enterococcus faecalis. Afebrile since; asymptomatic.   Plan: Penicillin 250 mg QID x 5 days (last dose 12/14/18).     (Z89.512) History of left below knee amputation (H)  Comment: 4 cm x 2 cm fissure with brown crusting, no expanding erythema, edema or increased pain.   Plan: Plan of care for left stump: 2x/ day   1. Clean with gentle soap and water, dry and dry again.  Make sure is dry before putting on prosthetic..  2. Apply lotion down to the very distal aspect of the stump but not on the very bottom/ distal aspect- want that to become dry and callused  3. Apply very small dab of Vaseline just where the fissure is.  Goal is to keep that just a bit moist so it can heal but not moisten the surround callus  4. Cover distal stump with one, dry 4x4 then a 4x4 that is opened and conformed to the stump  5. Wear a washable sleeve  6. Keep rubbery prosthetic clean    (I25.5) Ischemic cardiomyopathy  Comment: EF 35-40%  Plan: Torsemide 20 mg daily - monitor RLE edema and HF. Follow-up with cardiologist Dr. Downs 12/19 at 10:45 am.    (D68.61) Antiphospholipid antibody syndrome (H)  Comment: Hx of PE; on  chronic anticoagulant.  recent elevated INR. discussed with Dr Raymond he recommends checking chromogenic factor X  Plan: Continue Xarelto and baby aspirin.  chromogenic factor x on 12/17    (I10) Benign essential hypertension  (I48.0) Paroxysmal A-fib  (I77.810) Aortic root dilatation (H)  Comment: BPs 100's-120's/50's-70's; HR 50's-70's  Plan: Continue Coreg.     (F10.10) Alcohol abuse  (K70.31) Alcoholic cirrhosis of liver with ascites (H)  Comment: no signs of alcohol withdrawal; patient's abdomen is taught r/t fluid/ascites. Used to do paracentesis every 2 weeks in [place]; states not as frequently here has he needs new orders every time. Last paracentesis 10/31/18. Bilirubin 3.0 on 12/7/18 (baseline in hospital < 1.0); asymptomatic aside from jaundice.   Plan: Paracentesis delayed as INR 3.82. Pt should follow up with Dr Viral ANDINO. Unable to be seen until early January.     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   Plan: last seen 10/25/18 - follow-up in 6 months (April 2019).         Electronically signed by  DIPAK Huggins CNP                      Sincerely,        DIPAK Huggins CNP

## 2018-12-14 NOTE — PROGRESS NOTES
[place] [facility]    Chief Complaint   Patient presents with     RECHECK       [facility] Medical Record Number:  [id number]  [facility] of Service where encounter took place:  [facility] [[id number]]    HPI:    Antonio Ramirez is a 73 year old  (1945), who is being seen today for an episodic care visit.  HPI information obtained from: patient report, [facility] chart review and family/first contact [place] report.  Current issues are:    PMH and summary of most recent hospitalization below:       Patient sent to emergency department yesterday due to malaise, increased confusion, weakness. Work up was negative for hepatic encephalopathy or other metabolic disturbance. It is thought that increased malaise related to dilaudid.     Status post total replacement of right hip  Osteoarthritis of right hip, unspecified osteoarthritis type  Physical deconditioning  Hx of left below-the-knee amputation  Patient underwent right total hip arthroplasty at [facility] (primary surgeon Dr. Howard) on 11/27/18 d/t hx of osteoarthritis of the right hip. EBL 1200 mL - required 3 units PRBCs inpatient (11/29, 12/1 & 12/5). Dose of Venofer given 12/5 for possible iron-deficiency anemia. Prophy antibiotics x 24-hours post-op. Pain moderately controlled with PO Dilaudid (had been receiving 4 mg doses when experienced Syncope inpatient; now down to 2 mg with reports of poor pain control). Episode of ANSELMO - creatinine up to 2.09, estimated GFR 31 - improved to creatinine of 1.23, estimated GFR 57 12/4/18.     To note, patient previously had a left total arthroplasty and left below-the-knee amputation approximately 5 years ago in [place]. 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 It was noticed during hospitalization that there was a fissure on the left stump and concern for proper wound care  (i.e. malodorous, no sock between prosthesis & skin, etc.). Wound care orders from hospital in plan below.      Ischemic cardiomyopathy  Antiphospholipid antibody syndrome (H)  Paroxysmal atrial fibrillation (H)  Benign essential hypertension  Aortic Root Dilation  Echo 7/11/18 - EF 40-45% with anterior, septal & apical wall akinesis  Patient anticoagulated with chronic Xarelto d/t history of PE presumably related to his antiphospholipid antibody syndrome - held during immediately prior to and during surgery; restarted POD #1.   Coreg for chronic A-Fib.   Syncopal episode w/LOC during hospitalization (11/29/18 & 11/30/18) - etiology multifactorial (pain meds vs. vagal response vs. known aortic stenosis and aortic root dilation vs. Pulmonary hypertension vs. ?); recovered with fluids and RBCs.   Restarted Torsemide 12/2/18; US 12/3 to r/o DVT - recommend compression stockings.     Alcohol abuse  Possible alcoholic cirrhosis of the liver w/ascites  Patient reports h/o alcohol abuse, stopping in 2015 but ETOH level from 10/31/18 was 0.10. Family reports 40+ years of heavy drinking.   Possible alcoholic cirrhosis w/ascites although this was no mentioned in CT imaging; had a paracentesis 10/30/18, 3.3L removed   We did attempt to do paracentesis 12/11 but unable to do due to elevated INR 3.82  During most recent trip to emergency department ammonia level 44.   Malignant Lymphoplasmacytic Lymphoma  Diagnosed via BMBX - no evidence of progression in BMBX September 2018. Followed by Dr. Raymond at [facility]. I did discuss elevated INR with Dr Raymond. See below    ALLERGIES: Ciprofloxacin and Hmg-coa-r inhibitors  Past Medical, Surgical, Family and Social History reviewed and updated in [facility].    Current Outpatient Medications   Medication Sig Dispense Refill     aspirin 81 MG tablet Take 81 mg by mouth daily       carvedilol (COREG) 3.125 MG tablet Take 1 tablet (3.125 mg) by mouth 2 times daily (with meals) 180  tablet 1     fluticasone (FLONASE) 50 MCG/ACT spray Spray 1 spray into both nostrils daily       GABAPENTIN PO Take 600 mg by mouth 2 times daily       HYDROmorphone (DILAUDID) 2 MG tablet Take 2 mg by mouth every 8 hours       hydrOXYzine (ATARAX) 10 MG tablet Take 10 mg by mouth every 6 hours       lidocaine (LIDODERM) 5 % patch Place 2 patches onto the skin every 24 hours       nystatin (MYCOSTATIN) 087053 UNIT/GM external cream Apply topically 2 times daily       ondansetron (ZOFRAN-ODT) 4 MG ODT tab Take 1 tablet (4 mg) by mouth every 6 hours as needed for nausea or vomiting 30 tablet 0     penicillin V (VEETID) 250 MG tablet Take 250 mg by mouth every 6 hours For 5 days.       polyethylene glycol (MIRALAX/GLYCOLAX) packet Take 17 g by mouth daily       Rivaroxaban (XARELTO PO) Take 20 mg by mouth daily       senna-docusate (SENOKOT-S/PERICOLACE) 8.6-50 MG tablet Take 1 tablet by mouth 2 times daily 30 tablet 0     TORSEMIDE PO Take 20 mg by mouth daily       Medications reviewed:  Medications reconciled to facility chart and changes were made to reflect current medications as identified as above med list. Below are the changes that were made:   Medications stopped since last EPIC medication reconciliation:   There are no discontinued medications.    Medications started since last [facility] medication reconciliation:  No orders of the defined types were placed in this encounter.      REVIEW OF SYSTEMS:  4 point ROS including Respiratory, CV, GI and , other than that noted in the HPI,  is negative    Physical Exam:  /56   Pulse 67   Temp 99.6  F (37.6  C)   Resp 16   Ht 1.829 m (6')   Wt 106.1 kg (234 lb)   SpO2 91%   BMI 31.74 kg/m    GENERAL APPEARANCE:  Alert, in no distress  ENT:  Mouth and posterior oropharynx normal, moist mucous membranes, hearing acuity intact  EYES:  EOM, conjunctivae, lids, pupils and irises normal  NECK:  No adenopathy,masses or thyromegaly  RESP:  respiratory effort normal,  no respiratory distress, Lung sounds clear throughout  CV:  rate and rhythm irregularly irregular rhythm (chronic a-fib), Grade 3 systolic murmur best heard over the RUSB, but present throughout, no rub or gallop, Edema 2+ in the right foot and 2+ in the entire right leg. Lymphedema wrap in place.   ABDOMEN:  normal bowel sounds, soft, nontender, no hepatosplenomegaly or other masses; distended r/t ascites  M/S:   Gait and station not observed, Digits and nails with mild thickening, no clubbing  SKIN: extensive bruising around the right hip, non-blanchable, incision with scant dried bloody drainage and sanguinous drainage on bandages, no expanding erythema or edema around incision. Left stump with approximately 4 cm x 2 cm crusting fissure - no expanding erythema or edema, mild ecchymosis near stump, blanchable.   NEURO: 2-12 in normal limits and at patient's baseline  PSYCH:  Insight, judgement, and memory intact, affect and mood normal    Recent Labs:  CBC RESULTS:   Recent Labs   Lab Test 12/13/18  1247 12/07/18  0700   WBC 6.1 7.4   RBC 2.48* 2.93*   HGB 7.7* 9.0*   HCT 22.9* 26.6*   MCV 92 91   MCH 31.0 30.7   MCHC 33.6 33.8   RDW 17.1* 16.5*    190       Last Basic Metabolic Panel:  Recent Labs   Lab Test 12/13/18  1247 12/11/18  1311    137   POTASSIUM 4.6 4.3   CHLORIDE 101 101   BENNIE 8.5 8.6   CO2 29 28   BUN 44* 42*   CR 1.63* 1.42*   * 109*       Liver Function Studies -   Recent Labs   Lab Test 12/13/18  1247 12/11/18  1311   PROTTOTAL 6.7* 6.7*   ALBUMIN 2.6* 2.7*   BILITOTAL 2.3* 2.2*   ALKPHOS 106 95   AST 47* 38   ALT 16 13       TSH   Date Value Ref Range Status   10/31/2018 0.86 0.40 - 4.00 mU/L Final   10/31/2018 Canceled, Test credited, specimen discarded 0.40 - 4.00 mU/L Final     Comment:     Unsatisfactory specimen - hemolyzed   ]    Lab Results   Component Value Date    A1C 4.6 05/18/2018         Assessment/Plan:  Malaise:   Comment; patient sent to ED yesterday due to  increased confusion and malaise. Work up in ED was negative for anything acute. He is more alert today and following directions.   Plan: reduce dilaudid 2mg TID then to BID on 12/17    (Z96.641) Status post total replacement of right hip  (primary encounter diagnosis)  (M16.11) Osteoarthritis of right hip, unspecified osteoarthritis type  (R53.81) Physical deconditioning  (K59.00) Constipation  Comment: POD #16 s/p right TKA. Recent reduction in dilaudid.  We did have post op xray here yesterday- reviewed by yenny Cardona.   Goal is to return home when he has made progess with therapy  Plan: physical therapy     (N30.00) Acute cystitis without hematuria  Comment: UA w/10 WBCs, small leukocytes - cx positive for >100,000 colonies/mL of enterococcus faecalis. Afebrile since; asymptomatic.   Plan: Penicillin 250 mg QID x 5 days (last dose 12/14/18).     (Z89.512) History of left below knee amputation (H)  Comment: 4 cm x 2 cm fissure with brown crusting, no expanding erythema, edema or increased pain.   Plan: Plan of care for left stump: 2x/ day   1. Clean with gentle soap and water, dry and dry again.  Make sure is dry before putting on prosthetic..  2. Apply lotion down to the very distal aspect of the stump but not on the very bottom/ distal aspect- want that to become dry and callused  3. Apply very small dab of Vaseline just where the fissure is.  Goal is to keep that just a bit moist so it can heal but not moisten the surround callus  4. Cover distal stump with one, dry 4x4 then a 4x4 that is opened and conformed to the stump  5. Wear a washable sleeve  6. Keep rubbery prosthetic clean    (I25.5) Ischemic cardiomyopathy  Comment: EF 35-40%  Plan: Torsemide 20 mg daily - monitor RLE edema and HF. Follow-up with cardiologist Dr. Downs 12/19 at 10:45 am.    (D68.61) Antiphospholipid antibody syndrome (H)  Comment: Hx of PE; on chronic anticoagulant. recent elevated INR. discussed with Dr Raymond he recommends checking  chromogenic factor X  Plan: Continue Xarelto and baby aspirin. chromogenic factor x on 12/17    (I10) Benign essential hypertension  (I48.0) Paroxysmal A-fib  (I77.810) Aortic root dilatation (H)  Comment: BPs 100's-120's/50's-70's; HR 50's-70's  Plan: Continue Coreg.     (F10.10) Alcohol abuse  (K70.31) Alcoholic cirrhosis of liver with ascites (H)  Comment: no signs of alcohol withdrawal; patient's abdomen is taught r/t fluid/ascites. Used to do paracentesis every 2 weeks in [place]; states not as frequently here has he needs new orders every time. Last paracentesis 10/31/18. Bilirubin 3.0 on 12/7/18 (baseline in hospital < 1.0); asymptomatic aside from jaundice.   Plan: Paracentesis delayed as INR 3.82. Pt should follow up with Dr Viral ANDINO. Unable to be seen until early January.     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   Plan: last seen 10/25/18 - follow-up in 6 months (April 2019).         Electronically signed by  DIPAK Huggins CNP

## 2018-12-15 NOTE — PROGRESS NOTES
Clinic Care Coordination Contact  Care Coordination Transition Communication    Referral Source: ED Follow-Up    Clinical Data: Patient was at the ED of [facility] on 12/13/18 with diagnosis of altered mental status.     Return to TCU:        [facility]        Contact phone number: [phone]                           fax number: [phone]    Plan:   RN/SW Care Coordinator will await notification from facility staff informing RN/SW Care Coordinator of patient's discharge plans/needs. RN/SW Care Coordinator will review chart and outreach to facility staff every 4 weeks and as needed.       Diane Chaidez RN   Care Coordinator  [facility]  Phone:  [phone]  Email: [email or web address]

## 2018-12-17 ENCOUNTER — HOSPITAL LABORATORY (OUTPATIENT)
Dept: OTHER | Facility: CLINIC | Age: 73
End: 2018-12-17

## 2018-12-17 LAB — FACT X ACT/NOR PPP CHRO: 20 % (ref 70–130)

## 2018-12-18 ENCOUNTER — NURSING HOME VISIT (OUTPATIENT)
Dept: GERIATRICS | Facility: CLINIC | Age: 73
End: 2018-12-18
Payer: MEDICARE

## 2018-12-18 VITALS
BODY MASS INDEX: 31.69 KG/M2 | OXYGEN SATURATION: 98 % | RESPIRATION RATE: 16 BRPM | HEIGHT: 72 IN | SYSTOLIC BLOOD PRESSURE: 112 MMHG | HEART RATE: 50 BPM | DIASTOLIC BLOOD PRESSURE: 68 MMHG | TEMPERATURE: 98.8 F | WEIGHT: 234 LBS

## 2018-12-18 DIAGNOSIS — Z96.641 STATUS POST TOTAL REPLACEMENT OF RIGHT HIP: Primary | ICD-10-CM

## 2018-12-18 DIAGNOSIS — C83.00 MALIGNANT LYMPHOPLASMACYTIC LYMPHOMA (H): ICD-10-CM

## 2018-12-18 DIAGNOSIS — I77.810 AORTIC ROOT DILATATION (H): ICD-10-CM

## 2018-12-18 DIAGNOSIS — I10 BENIGN ESSENTIAL HYPERTENSION: ICD-10-CM

## 2018-12-18 DIAGNOSIS — F10.10 ALCOHOL ABUSE: ICD-10-CM

## 2018-12-18 DIAGNOSIS — M16.11 OSTEOARTHRITIS OF RIGHT HIP, UNSPECIFIED OSTEOARTHRITIS TYPE: ICD-10-CM

## 2018-12-18 DIAGNOSIS — R53.81 PHYSICAL DECONDITIONING: ICD-10-CM

## 2018-12-18 DIAGNOSIS — Z89.512 HISTORY OF LEFT BELOW KNEE AMPUTATION (H): ICD-10-CM

## 2018-12-18 DIAGNOSIS — I48.0 PAROXYSMAL ATRIAL FIBRILLATION (H): ICD-10-CM

## 2018-12-18 DIAGNOSIS — D68.61 ANTIPHOSPHOLIPID ANTIBODY SYNDROME (H): ICD-10-CM

## 2018-12-18 DIAGNOSIS — K70.31 ALCOHOLIC CIRRHOSIS OF LIVER WITH ASCITES (H): ICD-10-CM

## 2018-12-18 DIAGNOSIS — I25.5 ISCHEMIC CARDIOMYOPATHY: ICD-10-CM

## 2018-12-18 PROCEDURE — 99309 SBSQ NF CARE MODERATE MDM 30: CPT | Performed by: NURSE PRACTITIONER

## 2018-12-18 ASSESSMENT — MIFFLIN-ST. JEOR: SCORE: 1834.42

## 2018-12-18 NOTE — PROGRESS NOTES
[facility]    Chief Complaint   Patient presents with     RECHECK       [facility] Medical Record Number:  [id number]  [facility] of Service where encounter took place[facility] [[id number]]    HPI:    Antonio Ramirez is a 73 year old  (1945), who is being seen today for an episodic care visit.  HPI information obtained from: patient report, [facility] chart review and family/first contact [place] report.  Current issues are:    PMH and summary of most recent hospitalization below:       Patient sent to emergency department on 12/13 due to malaise, increased confusion, weakness. Work up was negative for hepatic encephalopathy or other metabolic disturbance. It is thought that increased malaise related to dilaudid.     Status post total replacement of right hip  Osteoarthritis of right hip, unspecified osteoarthritis type  Physical deconditioning  Hx of left below-the-knee amputation  Patient underwent right total hip arthroplasty at [facility] (primary surgeon Dr. Howard) on 11/27/18 d/t hx of osteoarthritis of the right hip. EBL 1200 mL - required 3 units PRBCs inpatient (11/29, 12/1 & 12/5). Dose of Venofer given 12/5 for possible iron-deficiency anemia. Prophy antibiotics x 24-hours post-op. Pain moderately controlled with PO Dilaudid (had been receiving 4 mg doses when experienced Syncope inpatient; now down to 2 mg with reports of poor pain control). Episode of ANSELMO - creatinine up to 2.09, estimated GFR 31 - improved to creatinine of 1.23, estimated GFR 57 12/4/18.     To note, patient previously had a left total arthroplasty and left below-the-knee amputation approximately 5 years ago in [place]. 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 It was noticed during hospitalization that there was a fissure on the left stump and concern for proper wound care (i.e.  malodorous, no sock between prosthesis & skin, etc.). Wound care orders from hospital in plan below.      Ischemic cardiomyopathy  Antiphospholipid antibody syndrome (H)  Paroxysmal atrial fibrillation (H)  Benign essential hypertension  Aortic Root Dilation  Echo 7/11/18 - EF 40-45% with anterior, septal & apical wall akinesis  Patient anticoagulated with chronic Xarelto d/t history of PE presumably related to his antiphospholipid antibody syndrome - held during immediately prior to and during surgery; restarted POD #1.   Coreg for chronic A-Fib.   Syncopal episode w/LOC during hospitalization (11/29/18 & 11/30/18) - etiology multifactorial (pain meds vs. vagal response vs. known aortic stenosis and aortic root dilation vs. Pulmonary hypertension vs. ?); recovered with fluids and RBCs.   Restarted Torsemide 12/2/18; US 12/3 to r/o DVT - recommend compression stockings.     Alcohol abuse  Possible alcoholic cirrhosis of the liver w/ascites  Patient reports h/o alcohol abuse, stopping in 2015 but ETOH level from 10/31/18 was 0.10. Family reports 40+ years of heavy drinking.   Possible alcoholic cirrhosis w/ascites although this was no mentioned in CT imaging; had a paracentesis 10/30/18, 3.3L removed   We did attempt to do paracentesis 12/11 but unable to do due to elevated INR 3.82  During most recent trip to emergency department ammonia level 44.   Malignant Lymphoplasmacytic Lymphoma  Diagnosed via BMBX - no evidence of progression in BMBX September 2018. Followed by Dr. Raymond at [facility]. I did discuss elevated INR with Dr Raymond. We will fax results of factor x to [facility]    ALLERGIES: Ciprofloxacin and Hmg-coa-r inhibitors  Past Medical, Surgical, Family and Social History reviewed and updated in [facility].    Current Outpatient Medications   Medication Sig Dispense Refill     aspirin 81 MG tablet Take 81 mg by mouth daily       carvedilol (COREG) 3.125 MG tablet Take 1 tablet (3.125 mg) by mouth 2 times  daily (with meals) 180 tablet 1     fluticasone (FLONASE) 50 MCG/ACT spray Spray 1 spray into both nostrils daily       GABAPENTIN PO Take 600 mg by mouth 2 times daily       HYDROmorphone (DILAUDID) 2 MG tablet Take 2 mg by mouth 2 times daily       hydrOXYzine (ATARAX) 10 MG tablet Take 10 mg by mouth every 6 hours       lidocaine (LIDODERM) 5 % patch Place 2 patches onto the skin every 24 hours       nystatin (MYCOSTATIN) 875772 UNIT/GM external cream Apply topically 2 times daily       ondansetron (ZOFRAN-ODT) 4 MG ODT tab Take 1 tablet (4 mg) by mouth every 6 hours as needed for nausea or vomiting 30 tablet 0     polyethylene glycol (MIRALAX/GLYCOLAX) packet Take 17 g by mouth daily       Rivaroxaban (XARELTO PO) Take 20 mg by mouth daily       senna-docusate (SENOKOT-S/PERICOLACE) 8.6-50 MG tablet Take 1 tablet by mouth 2 times daily 30 tablet 0     TORSEMIDE PO Take 20 mg by mouth daily       Medications reviewed:  Medications reconciled to facility chart and changes were made to reflect current medications as identified as above med list. Below are the changes that were made:   Medications stopped since last EPIC medication reconciliation:   Medications Discontinued During This Encounter   Medication Reason     penicillin V (VEETID) 250 MG tablet Medication Reconciliation Clean Up       Medications started since last [facility] medication reconciliation:  No orders of the defined types were placed in this encounter.      REVIEW OF SYSTEMS:  4 point ROS including Respiratory, CV, GI and , other than that noted in the HPI,  is negative    Physical Exam:  /68   Pulse 50   Temp 98.8  F (37.1  C)   Resp 16   Ht 1.829 m (6')   Wt 106.1 kg (234 lb)   SpO2 98%   BMI 31.74 kg/m    GENERAL APPEARANCE:  Alert, in no distress  ENT:  Mouth and posterior oropharynx normal, moist mucous membranes, hearing acuity intact  EYES:  EOM, conjunctivae, lids, pupils and irises normal  NECK:  No adenopathy,masses or  thyromegaly  RESP:  respiratory effort normal, no respiratory distress, Lung sounds clear throughout  CV:  rate and rhythm irregularly irregular rhythm (chronic a-fib), Grade 3 systolic murmur best heard over the RUSB, but present throughout, no rub or gallop, Edema 2+ in the right foot and 2+ in the entire right leg. Lymphedema wrap in place.   ABDOMEN:  normal bowel sounds, tense, nontender; distended r/t ascites  M/S:   Gait and station not observed, Digits and nails with mild thickening, no clubbing  SKIN: extensive bruising around the right hip, non-blanchable, incision with scant dried bloody drainage and sanguinous drainage on bandages, no expanding erythema or edema around incision. Left stump with approximately 4 cm x 2 cm crusting fissure - no expanding erythema or edema, mild ecchymosis near stump, blanchable.   NEURO: 2-12 in normal limits and at patient's baseline  PSYCH:  Insight, judgement, and memory intact, affect and mood normal    Recent Labs:  CBC RESULTS:   Recent Labs   Lab Test 12/13/18  1247 12/07/18  0700   WBC 6.1 7.4   RBC 2.48* 2.93*   HGB 7.7* 9.0*   HCT 22.9* 26.6*   MCV 92 91   MCH 31.0 30.7   MCHC 33.6 33.8   RDW 17.1* 16.5*    190       Last Basic Metabolic Panel:  Recent Labs   Lab Test 12/13/18  1247 12/11/18  1311    137   POTASSIUM 4.6 4.3   CHLORIDE 101 101   BENNIE 8.5 8.6   CO2 29 28   BUN 44* 42*   CR 1.63* 1.42*   * 109*       Liver Function Studies -   Recent Labs   Lab Test 12/13/18  1247 12/11/18  1311   PROTTOTAL 6.7* 6.7*   ALBUMIN 2.6* 2.7*   BILITOTAL 2.3* 2.2*   ALKPHOS 106 95   AST 47* 38   ALT 16 13       TSH   Date Value Ref Range Status   10/31/2018 0.86 0.40 - 4.00 mU/L Final   10/31/2018 Canceled, Test credited, specimen discarded 0.40 - 4.00 mU/L Final     Comment:     Unsatisfactory specimen - hemolyzed     Lab Results   Component Value Date    A1C 4.6 05/18/2018     Assessment/Plan:  Malaise:   Comment; patient sent to ED yesterday due to  increased confusion and malaise. Work up in ED was negative for anything acute. He is more alert today and following directions.   Plan: reduce dilaudid 2mg to BID    (Z96.641) Status post total replacement of right hip  (primary encounter diagnosis)  (M16.11) Osteoarthritis of right hip, unspecified osteoarthritis type  (R53.81) Physical deconditioning  (K59.00) Constipation  Comment:  s/p right TKA. Recent reduction in dilaudid.  Patient did return to see Dr Howard-recommend more physical therapy   Goal is to return home when he has made progess with therapy  Plan: physical therapy       (Z89.608) History of left below knee amputation (H)  Comment: 4 cm x 2 cm fissure with brown crusting, no expanding erythema, edema or increased pain.   Plan: Plan of care for left stump: 2x/ day   1. Clean with gentle soap and water, dry and dry again.  Make sure is dry before putting on prosthetic..  2. Apply lotion down to the very distal aspect of the stump but not on the very bottom/ distal aspect- want that to become dry and callused  3. Apply very small dab of Vaseline just where the fissure is.  Goal is to keep that just a bit moist so it can heal but not moisten the surround callus  4. Cover distal stump with one, dry 4x4 then a 4x4 that is opened and conformed to the stump  5. Wear a washable sleeve  6. Keep rubbery prosthetic clean    (I25.5) Ischemic cardiomyopathy  Comment: EF 35-40%  Plan: Torsemide 20 mg daily - monitor RLE edema and HF. Follow-up with cardiologist Dr. Downs 12/19 at 10:45 am.    (D68.61) Antiphospholipid antibody syndrome (H)  Comment: Hx of PE; on chronic anticoagulant. recent elevated INR. discussed with Dr Raymond he recommends checking chromogenic factor X  Plan: Continue Xarelto and baby aspirin. chromogenic factor x results to be sent to Dr Raymond    (I10) Benign essential hypertension  (I48.0) Paroxysmal A-fib  (I77.810) Aortic root dilatation (H)  Comment: BPs 100's-120's/50's-70's;  HR 50's-70's  Plan: Continue Coreg.     (F10.10) Alcohol abuse  (K70.31) Alcoholic cirrhosis of liver with ascites (H)  Comment: no signs of alcohol withdrawal; patient's abdomen is taught r/t fluid/ascites. Used to do paracentesis every 2 weeks in [place]; states not as frequently here has he needs new orders every time. Last paracentesis 10/31/18. Bilirubin 3.0 on 12/7/18 and 2.2 on 12/11 (baseline in hospital < 1.0.) He does feel like belly is more tense now.   Plan: schedule paracentesis. Will hold xarelto for two days prior to procedure    (C83.00) Malignant lymphoplasmacytic lymphoma (H)  Comment: Followed at [facility]; consider role of Lymphoma in anemia post-op; CBC stable 12/7 (hgb 9.0).   Plan: last seen 10/25/18 - follow-up in 6 months (April 2019).         Electronically signed by  DIPAK Huggins CNP

## 2018-12-18 NOTE — LETTER
12/18/2018        RE: Antonio Ramirez  [address]  [place] [address]        [facility]    Chief Complaint   Patient presents with     RECHECK       [facility] Record Number:  [id number]  [facility] of Service where encounter took place:  [facility] [[id number]]    HPI:    Antonio Ramirez is a 73 year old  (1945), who is being seen today for an episodic care visit.  HPI information obtained from: patient report, [facility] chart review and family/first contact [place] report.  Current issues are:    PMH and summary of most recent hospitalization below:       Patient sent to emergency department on 12/13 due to malaise, increased confusion, weakness. Work up was negative for hepatic encephalopathy or other metabolic disturbance. It is thought that increased malaise related to dilaudid.     Status post total replacement of right hip  Osteoarthritis of right hip, unspecified osteoarthritis type  Physical deconditioning  Hx of left below-the-knee amputation  Patient underwent right total hip arthroplasty at [facility] (primary surgeon Dr. Howard) on 11/27/18 d/t hx of osteoarthritis of the right hip. EBL 1200 mL - required 3 units PRBCs inpatient (11/29, 12/1 & 12/5). Dose of Venofer given 12/5 for possible iron-deficiency anemia. Prophy antibiotics x 24-hours post-op. Pain moderately controlled with PO Dilaudid (had been receiving 4 mg doses when experienced Syncope inpatient; now down to 2 mg with reports of poor pain control). Episode of ANSELMO - creatinine up to 2.09, estimated GFR 31 - improved to creatinine of 1.23, estimated GFR 57 12/4/18.     To note, patient previously had a left total arthroplasty and left below-the-knee amputation approximately 5 years ago in [place]. 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 It was noticed during  hospitalization that there was a fissure on the left stump and concern for proper wound care (i.e. malodorous, no sock between prosthesis & skin, etc.). Wound care orders from hospital in plan below.      Ischemic cardiomyopathy  Antiphospholipid antibody syndrome (H)  Paroxysmal atrial fibrillation (H)  Benign essential hypertension  Aortic Root Dilation  Echo 7/11/18 - EF 40-45% with anterior, septal & apical wall akinesis  Patient anticoagulated with chronic Xarelto d/t history of PE presumably related to his antiphospholipid antibody syndrome - held during immediately prior to and during surgery; restarted POD #1.   Coreg for chronic A-Fib.   Syncopal episode w/LOC during hospitalization (11/29/18 & 11/30/18) - etiology multifactorial (pain meds vs. vagal response vs. known aortic stenosis and aortic root dilation vs. Pulmonary hypertension vs. ?); recovered with fluids and RBCs.   Restarted Torsemide 12/2/18; US 12/3 to r/o DVT - recommend compression stockings.     Alcohol abuse  Possible alcoholic cirrhosis of the liver w/ascites  Patient reports h/o alcohol abuse, stopping in 2015 but ETOH level from 10/31/18 was 0.10. Family reports 40+ years of heavy drinking.   Possible alcoholic cirrhosis w/ascites although this was no mentioned in CT imaging; had a paracentesis 10/30/18, 3.3L removed   We did attempt to do paracentesis 12/11 but unable to do due to elevated INR 3.82  During most recent trip to emergency department ammonia level 44.   Malignant Lymphoplasmacytic Lymphoma  Diagnosed via BMBX - no evidence of progression in BMBX September 2018. Followed by Dr. Raymond at [facility]. I did discuss elevated INR with Dr Raymond. We will fax results of factor x to [facility]    ALLERGIES: Ciprofloxacin and Hmg-coa-r inhibitors  Past Medical, Surgical, Family and Social History reviewed and [facility] in [facility].    Current Outpatient Medications   Medication Sig Dispense Refill     aspirin 81 MG tablet Take 81 mg by  mouth daily       carvedilol (COREG) 3.125 MG tablet Take 1 tablet (3.125 mg) by mouth 2 times daily (with meals) 180 tablet 1     fluticasone (FLONASE) 50 MCG/ACT spray Spray 1 spray into both nostrils daily       GABAPENTIN PO Take 600 mg by mouth 2 times daily       HYDROmorphone (DILAUDID) 2 MG tablet Take 2 mg by mouth 2 times daily       hydrOXYzine (ATARAX) 10 MG tablet Take 10 mg by mouth every 6 hours       lidocaine (LIDODERM) 5 % patch Place 2 patches onto the skin every 24 hours       nystatin (MYCOSTATIN) 628286 UNIT/GM external cream Apply topically 2 times daily       ondansetron (ZOFRAN-ODT) 4 MG ODT tab Take 1 tablet (4 mg) by mouth every 6 hours as needed for nausea or vomiting 30 tablet 0     polyethylene glycol (MIRALAX/GLYCOLAX) packet Take 17 g by mouth daily       Rivaroxaban (XARELTO PO) Take 20 mg by mouth daily       senna-docusate (SENOKOT-S/PERICOLACE) 8.6-50 MG tablet Take 1 tablet by mouth 2 times daily 30 tablet 0     TORSEMIDE PO Take 20 mg by mouth daily       Medications reviewed:  Medications reconciled to facility chart and changes were made to reflect current medications as identified as above med list. Below are the changes that were made:   Medications stopped since last EPIC medication reconciliation:   Medications Discontinued During This Encounter   Medication Reason     penicillin V (VEETID) 250 MG tablet Medication Reconciliation Clean Up       Medications started since last [facility] medication reconciliation:  No orders of the defined types were placed in this encounter.      REVIEW OF SYSTEMS:  4 point ROS including Respiratory, CV, GI and , other than that noted in the HPI,  is negative    Physical Exam:  /68   Pulse 50   Temp 98.8  F (37.1  C)   Resp 16   Ht 1.829 m (6')   Wt 106.1 kg (234 lb)   SpO2 98%   BMI 31.74 kg/m     GENERAL APPEARANCE:  Alert, in no distress  ENT:  Mouth and posterior oropharynx normal, moist mucous membranes, hearing acuity  intact  EYES:  EOM, conjunctivae, lids, pupils and irises normal  NECK:  No adenopathy,masses or thyromegaly  RESP:  respiratory effort normal, no respiratory distress, Lung sounds clear throughout  CV:  rate and rhythm irregularly irregular rhythm (chronic a-fib), Grade 3 systolic murmur best heard over the RUSB, but present throughout, no rub or gallop, Edema 2+ in the right foot and 2+ in the entire right leg. Lymphedema wrap in place.   ABDOMEN:  normal bowel sounds, tense, nontender; distended r/t ascites  M/S:   Gait and station not observed, Digits and nails with mild thickening, no clubbing  SKIN: extensive bruising around the right hip, non-blanchable, incision with scant dried bloody drainage and sanguinous drainage on bandages, no expanding erythema or edema around incision. Left stump with approximately 4 cm x 2 cm crusting fissure - no expanding erythema or edema, mild ecchymosis near stump, blanchable.   NEURO: 2-12 in normal limits and at patient's baseline  PSYCH:  Insight, judgement, and memory intact, affect and mood normal    Recent Labs:  CBC RESULTS:   Recent Labs   Lab Test 12/13/18  1247 12/07/18  0700   WBC 6.1 7.4   RBC 2.48* 2.93*   HGB 7.7* 9.0*   HCT 22.9* 26.6*   MCV 92 91   MCH 31.0 30.7   MCHC 33.6 33.8   RDW 17.1* 16.5*    190       Last Basic Metabolic Panel:  Recent Labs   Lab Test 12/13/18  1247 12/11/18  1311    137   POTASSIUM 4.6 4.3   CHLORIDE 101 101   BENNIE 8.5 8.6   CO2 29 28   BUN 44* 42*   CR 1.63* 1.42*   * 109*       Liver Function Studies -   Recent Labs   Lab Test 12/13/18  1247 12/11/18  1311   PROTTOTAL 6.7* 6.7*   ALBUMIN 2.6* 2.7*   BILITOTAL 2.3* 2.2*   ALKPHOS 106 95   AST 47* 38   ALT 16 13       TSH   Date Value Ref Range Status   10/31/2018 0.86 0.40 - 4.00 mU/L Final   10/31/2018 Canceled, Test credited, specimen discarded 0.40 - 4.00 mU/L Final     Comment:     Unsatisfactory specimen - hemolyzed     Lab Results   Component Value Date     A1C 4.6 05/18/2018     Assessment/Plan:  Malaise:   Comment; patient sent to ED yesterday due to increased confusion and malaise. Work up in ED was negative for anything acute. He is more alert today and following directions.   Plan: reduce dilaudid 2mg to BID    (Z96.641) Status post total replacement of right hip  (primary encounter diagnosis)  (M16.11) Osteoarthritis of right hip, unspecified osteoarthritis type  (R53.81) Physical deconditioning  (K59.00) Constipation  Comment:  s/p right TKA. Recent reduction in dilaudid.  Patient did return to see Dr Howard-recommend more physical therapy   Goal is to return home when he has made progess with therapy  Plan: physical therapy       (Z89.304) History of left below knee amputation (H)  Comment: 4 cm x 2 cm fissure with brown crusting, no expanding erythema, edema or increased pain.   Plan: Plan of care for left stump: 2x/ day   1. Clean with gentle soap and water, dry and dry again.  Make sure is dry before putting on prosthetic..  2. Apply lotion down to the very distal aspect of the stump but not on the very bottom/ distal aspect- want that to become dry and callused  3. Apply very small dab of Vaseline just where the fissure is.  Goal is to keep that just a bit moist so it can heal but not moisten the surround callus  4. Cover distal stump with one, dry 4x4 then a 4x4 that is opened and conformed to the stump  5. Wear a washable sleeve  6. Keep rubbery prosthetic clean    (I25.5) Ischemic cardiomyopathy  Comment: EF 35-40%  Plan: Torsemide 20 mg daily - monitor RLE edema and HF. Follow-up with cardiologist Dr. Downs 12/19 at 10:45 am.    (D68.61) Antiphospholipid antibody syndrome (H)  Comment: Hx of PE; on chronic anticoagulant. recent elevated INR. discussed with Dr Raymond he recommends checking chromogenic factor X  Plan: Continue Xarelto and baby aspirin. chromogenic factor x results to be sent to Dr Raymond    (I10) Benign essential  hypertension  (I48.0) Paroxysmal A-fib  (I77.810) Aortic root dilatation (H)  Comment: BPs 100's-120's/50's-70's; HR 50's-70's  Plan: Continue Coreg.     (F10.10) Alcohol abuse  (K70.31) Alcoholic cirrhosis of liver with ascites (H)  Comment: no signs of alcohol withdrawal; patient's abdomen is taught r/t fluid/ascites. Used to do paracentesis every 2 weeks in [place]; states not as frequently here has he needs new orders every time. Last paracentesis 10/31/18. Bilirubin 3.0 on 12/7/18 and 2.2 on 12/11 (baseline in hospital < 1.0.) He does feel like belly is more tense now.   Plan:  schedule paracentesis. Will hold xarelto for two days prior to procedure    (C83.00) Malignant lymphoplasmacytic lymphoma (H)  Comment: Followed at [facility]; consider role of Lymphoma in anemia post-op; CBC stable 12/7 (hgb 9.0).   Plan: last seen 10/25/18 - follow-up in 6 months (April 2019).         Electronically signed by  DIPAK Huggins CNP                      Sincerely,        DIPAK Huggins CNP

## 2018-12-19 ENCOUNTER — OFFICE VISIT (OUTPATIENT)
Dept: CARDIOLOGY | Facility: CLINIC | Age: 73
End: 2018-12-19
Payer: MEDICARE

## 2018-12-19 VITALS
DIASTOLIC BLOOD PRESSURE: 59 MMHG | HEIGHT: 74 IN | HEART RATE: 58 BPM | WEIGHT: 241 LBS | SYSTOLIC BLOOD PRESSURE: 101 MMHG | BODY MASS INDEX: 30.93 KG/M2

## 2018-12-19 DIAGNOSIS — I25.10 CORONARY ARTERY DISEASE INVOLVING NATIVE CORONARY ARTERY OF NATIVE HEART WITHOUT ANGINA PECTORIS: Primary | ICD-10-CM

## 2018-12-19 PROCEDURE — 99214 OFFICE O/P EST MOD 30 MIN: CPT | Performed by: INTERNAL MEDICINE

## 2018-12-19 ASSESSMENT — MIFFLIN-ST. JEOR: SCORE: 1898.17

## 2018-12-19 NOTE — PROGRESS NOTES
"HPI and Plan:     Mr. Antonio Ramirez was seen in post-hospital follow-up at [facility].  He is 73 years old,  has a complex medical history and was referred 2 months ago by Dr. Hardy to establish care.  Until then, he had been followed through [facility] cardiology. He is here after right hip replacement and at the request of Dr. Tucker of the Hospitalist service for edema and aortic stenosis follow-up.    Mr. Ramirez is doing physical therapy and [facility]. He denies dyspnea or chest discomfort or palpitations. Physical therapy is progressing slowly.  He has persistent right calf edema but is using a Velcro wrap.  His left thigh is also mildly edematous.  He has had a reaccumulation of ascites and is due to have paracentesis in about a week.  His protein is 6.7 and albumin 2.6 - both low.    He has a history of coronary artery disease (CAD), prior coronary artery bypass surgery (CABG),  antiphospholipid syndrome, \"alcoholic\" cirrhosis, moderate mitral regurgitation, non-Hodgkin's (large B-cell) lymphoma, and persistent atrial fibrillation. He also has chronic kidney disease and anemia as well as thrombocytopenia.  He has had falls and experienced a subdural hematoma and craniotomy. He has nephrolithiasis and has had ureteral stenting. This summer, he experienced sepsis with a urinary tract infection.    He has atrial fibrillation but he never had symptoms associated with this. Over the past year, each electrocardiogram has shown atrial fibrillation. However on exams, notes described a pulse  fairly regular and that a heart rate that is controlled.     Currently, Mr. Ramirez denies any shortness of breath. 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    In the past, Dr. Box switched his diuretic to torsemide. Initially, this resulted in much improved diuresis, and he noted that his edema was better. " "Lately, he has developed marked lymphedema and ascites.  He has been seen in Lymphedema Clinic.  He has a history of an LAD stent in 2007, followed by urgent bypass later that same year. He denies any recent chest pain.     He had been chronically anticoagulated with warfarin secondary to lower extremity thrombosis in 2007 with placement of an IVC filter. His filter thrombosed as well, and he was eventually diagnosed with antiphospholipid antibody syndrome. He is now on Xarelto 20 mg daily.     A stress test was performed within the last 2 years in [place] and he was reportedly told there was no evidence of ischemia. His ejection fraction at that time was 47%. Earlier this year echocardiography had demonstrated an ejection fraction between 40 to 45%, mitral annular calcification with posterior leaflet restriction and 2+ mitral regurgitation and moderate to severe tricuspid regurgitation. The last echo indicated aortic root enlargement as moderate (and increased) with an LV ejection fraction of 35 to 40%, mild to moderate MR and AS with an DIVINA of 1.2 cm2 (25 mmHg mean).    Exam:  Blood pressure 101/59, pulse 58, height 1.88 m (6' 2.02\"), weight 109.3 kg (241 lb).    Chest was clear and cardiac exam notable for irregularly irregular S1, S2 and 1-2/6 holosystolic murmur, 1/6 LUIS at LSB.    Assessment/Plan:  Mr. Ramirez has a very complex set of medical problems - many of which are interrelated.  To summarize, his cardiology issues seem stable.  They include,    1. coronary artery disease S/P CABG in 2007,  2. atrial fibrillation, asymptomatic and likely permanent,  3. ischemic cardiomyopathy, mild to moderate (EF 40-45%),  4. moderate mitral insufficiency,  5. moderately severe tricuspid insufficiency,  6. pulmonary hypertension (likely multifactorial),  7. aortic root dilation, mild,  8. aortic stenosis, moderate.    With regard to the atrial fibrillation, he is asymptomatic and appropriately  on anticoagulation " (especially given his history of pulmonary emboli).  He does have a fall risk and history of  A subdural bleed after a fall. He is on low dose beta blockade and AV ama blockade without evidence of tachycardia. A future Holter can be considered at a return visit to assess heart rate variability.    With regard to his CAD and prior CABG, he is asymptomatic and on appropriate risk factor intervention. Statin therapy has been discontinued given his intolerance. A PCSK 9 inhibitor is an option. There is not hepatic clearance of the PCSK 9 inhibitors (proteolysis and binding to PCSK elimination) so he could be a candidate for Repatha. He is on low dose aspirin and beta blockade.  He is not on an ACEI or ARB given his   chronic kidney disease.      With regard to his cardiomyopathy, he has no congestive heart failure (CHF) symptoms.  He is on carvedilol but not a vasodilator (as described above).  Given his edema, hydralazine/nitrates may cause more adverse symptoms than benefits.    With regard to his mitral insufficiency, he is asymptomatic and it sounds improved from past assessments.  An echo has been ordered and will be repeated in the future.    His pulmonary hypertension and tricuspid insufficiency are likely related.  Pulmonary hypertension is likely multifactorial and the result of his prior pulmonary embolism, cirrhosis and possibly undiagnosed sleep apnea and/or left heart disease.  In future, a sleep study may be of benefit though he has many more immediate problems.    The mild aortic root dilation will be reassessed with echocardiography.  On the echo of July, 2018, the Sinuses were noted to be 4.3 cm. The ascending aorta was not imaged as distally as the present study which noted a 4.7 cm vessel. Will consider an MRA though GFR now  41 ml/min./BSA - may be able to do without contrast.    With regard to the aortic stenosis, it is mild to moderate and the mean gradient is only 14 mmHg. It will be followed by  echocardiography.    He has other contributory problems including chronic kidney disease, falls, arthritis, peripheral neuropathy, nephrolithiasis, a history of bilateral and saddle pulmonary emboli in 2006, an inferior vena cava (IVC) filter and more recently recurrent lymphedema.      For his lymphedema, he is starting to follow with Lymphedema clinic.  In future, we discussed that he may benefit from a venous insufficiency ultrasound of the legs.  Ablation could be helpful if compression and diuresis alone are not satisfactory.  Again, the  lymphedema is also likely multifactorial and the result of CKD, low oncotic pressure associated with liver disease, impaired mobility, an inferior vena cava filter and anemia.    A follow-up visit has been scheduled.  An echo will be repeated in 4 months.      CURRENT MEDICATIONS:  Current Outpatient Medications   Medication Sig Dispense Refill     aspirin 81 MG tablet Take 81 mg by mouth daily       carvedilol (COREG) 3.125 MG tablet Take 1 tablet (3.125 mg) by mouth 2 times daily (with meals) 180 tablet 1     fluticasone (FLONASE) 50 MCG/ACT spray Spray 1 spray into both nostrils daily       GABAPENTIN PO Take 600 mg by mouth 2 times daily       HYDROmorphone (DILAUDID) 2 MG tablet Take 2 mg by mouth 2 times daily       hydrOXYzine (ATARAX) 10 MG tablet Take 10 mg by mouth every 6 hours       lidocaine (LIDODERM) 5 % patch Place 2 patches onto the skin every 24 hours       nystatin (MYCOSTATIN) 360688 UNIT/GM external cream Apply topically 2 times daily       ondansetron (ZOFRAN-ODT) 4 MG ODT tab Take 1 tablet (4 mg) by mouth every 6 hours as needed for nausea or vomiting 30 tablet 0     polyethylene glycol (MIRALAX/GLYCOLAX) packet Take 17 g by mouth daily       Rivaroxaban (XARELTO PO) Take 20 mg by mouth daily       senna-docusate (SENOKOT-S/PERICOLACE) 8.6-50 MG tablet Take 1 tablet by mouth 2 times daily 30 tablet 0     TORSEMIDE PO Take 20 mg by mouth daily    "      ALLERGIES     Allergies   Allergen Reactions     Ciprofloxacin Itching     Severe itching \"like ants were crawling\"     Hmg-Coa-R Inhibitors Other (See Comments)     Rhabdomyolysis occurred within a couple days       PAST MEDICAL HISTORY:  Past Medical History:   Diagnosis Date     Alcoholism (H)      Amputated left leg (H)      Anemia      Anticardiolipin antibody syndrome (H)      Antiphospholipid antibody syndrome (H)      Antiplatelet or antithrombotic long-term use      Aortic root dilatation (H)      Aortic stenosis      Arthritis      Balance problem      Coronary artery disease     CABG 2007: SVG to LAD, SVG to diagonal, SVG to OM; cardiac cath 5/17/18: thrombectomy for restenosis of stents-sent for urgent CABG, cath 5/14/2007: GRECIA x2 to LAD, Grecia to diagonal     Gastro-oesophageal reflux disease      Heart attack (H) 2007    apparently arrested, cardiac X5     Hiatal hernia      Hypercholesterolemia      Hypertension      Ischemic cardiomyopathy      Left foot drop      Liver disease     alcoholic liver disease, alcoholic cirrohsosis, portal hypertension     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)     ?When diagnosed, patient not aware of this     Moderate mitral regurgitation      Monoclonal gammopathy      Neuropathy      Noninfectious ileitis     diverticulitis of colon     Nonrheumatic tricuspid valve regurgitation      Paroxysmal atrial fibrillation (H)      PE (pulmonary embolism) 2006    saddle emboli 2006     Prostate cancer (H) 2000    Treated with radiation (seed implants in 2000) -- no problems since     Pulmonary hypertension (H)      Renal disease     kidney stones     Syncope      Thrombocytopenia (H)      Urethral stricture      Venous thrombosis     IVC filter and lysis procedures 2007       PAST SURGICAL HISTORY:  Past Surgical History:   Procedure Laterality Date     AMPUTATE LEG BELOW KNEE Left 04/2014    related to gangrene, started as foot ulcer related to neuropathy     " AMPUTATE LEG BELOW KNEE Left 12/4/2017    Procedure: AMPUTATE LEG BELOW KNEE;  Exploration of Left Leg Below Knee Amputation Stump with Ligation of Bleeders.;  Surgeon: Leandro Arreola MD;  Location:  OR     APPENDECTOMY       APPLY WOUND VAC Left 12/6/2017    Procedure: APPLY WOUND VAC;;  Surgeon: Saima Howard MD;  Location:  [facility]     ARTHROPLASTY HIP Right 11/27/2018    Procedure: RIGHT TOTAL HIP ARTHROPLASTY;  Surgeon: Saima Howard MD;  Location:  OR     ARTHROPLASTY KNEE Right 9/19/2014    Procedure: ARTHROPLASTY KNEE;  Surgeon: Saima Howard MD;  Location:  OR     CARDIAC SURGERY      CABG     CHOLECYSTECTOMY       COLONOSCOPY       COMBINED CYSTOSCOPY, RETROGRADES, EXCHANGE STENT URETER(S) Left 7/24/2018    Procedure: COMBINED CYSTOSCOPY, RETROGRADES, EXCHANGE STENT URETER(S);  CYSTOSCOPY, BILATERAL RETROGRADES, BILATERAL URETERAL STENT EXCHANGE ;  Surgeon: Matt Cervantes MD;  Location:  OR     CRANIOTOMY Right 4/7/2018    Procedure: CRANIOTOMY;  Right Craniotomy For Subdural Hematoma;  Surgeon: Jono Issa MD;  Location:  OR     CYSTOSCOPY, DILATE URETHRA, COMBINED N/A 10/12/2016    Procedure: COMBINED CYSTOSCOPY, DILATE URETHRA;  Surgeon: Dimitry Bello MD;  Location:  OR     CYSTOSCOPY, REMOVE STENT(S), COMBINED Right 7/24/2018    Procedure: COMBINED CYSTOSCOPY, REMOVE STENT(S);;  Surgeon: Jose Hunter MD;  Location:  OR     ENDOSCOPIC STRIPPING VEIN(S)       esophagogastroduodenoscopy       EYE SURGERY      cataracts     GENITOURINARY SURGERY      Prostate Seen Implants     HERNIA REPAIR      Umbilical     INCISION AND DRAINAGE LOWER EXTREMITY, COMBINED Left 12/1/2017    Procedure: COMBINED INCISION AND DRAINAGE LOWER EXTREMITY;  INCISION AND DRAINAGE OF LEFT BELOW KNEE AMPUTATION ABSCESS, WOUND VAC PLACEMENT;  Surgeon: Saima Howard MD;  Location:  OR     IR IVC FILTER PLACEMENT       IRRIGATION AND DEBRIDEMENT LOWER EXTREMITY,  COMBINED Left 2017    Procedure: COMBINED IRRIGATION AND DEBRIDEMENT LOWER EXTREMITY;  IRRIGATION AND DEBRIDEMENT OF LEFT BELOW KNEE AMPUTATION SITE WITH WOUND VAC REPLACEMENT;  Surgeon: Saima Howard MD;  Location: [facility]     IRRIGATION AND DEBRIDEMENT LOWER EXTREMITY, COMBINED Left 2017    Procedure: COMBINED IRRIGATION AND DEBRIDEMENT LOWER EXTREMITY;  IRRIGATION AND DEBRIDEMENT OF LEFT BELOW THE KNEE AMPUTATION AND SHORTENING OF THE TIBIA WITH WOUND CLOSURE;  Surgeon: Saima Howard MD;  Location:  OR     LASER HOLMIUM LITHOTRIPSY URETER(S), INSERT STENT, COMBINED Bilateral 2018    Procedure: COMBINED CYSTOSCOPY, URETEROSCOPY, LASER HOLMIUM LITHOTRIPSY URETER(S), INSERT STENT;  CYSTOSCOPY, BILATERAL URETEROSCOPY,  HOLMIUM LASER LITHOTRIPSY, BILATERAL STENT PLACEMENT, STONE BASKETING;  Surgeon: Jose Hunter MD;  Location:  OR     LASER HOLMIUM LITHOTRIPSY URETER(S), INSERT STENT, COMBINED Left 2018    Procedure: COMBINED CYSTOSCOPY, URETEROSCOPY, LASER HOLMIUM LITHOTRIPSY URETER(S), INSERT STENT;  CYSTOSCOPY, LEFT URETEROSCOPY, LEFT LASER HOLMIUM LITHOTRIPSY URETER(S) REMOVAL BILATERAL STENTS;  Surgeon: Jose Hunter MD;  Location:  OR     ORTHOPEDIC SURGERY      (R) knee scope     ORTHOPEDIC SURGERY      total hip      ORTHOPEDIC SURGERY      elbow surgery       FAMILY HISTORY:  Family History   Problem Relation Age of Onset     Lung Cancer Mother      Heart Failure Father          age 87       SOCIAL HISTORY:  Social History     Socioeconomic History     Marital status:      Spouse name: None     Number of children: 2     Years of education: None     Highest education level: None   Social Needs     Financial resource strain: None     Food insecurity - worry: None     Food insecurity - inability: None     Transportation needs - medical: None     Transportation needs - non-medical: None   Occupational History     Occupation: Retired    Tobacco  "Use     Smoking status: Never Smoker     Smokeless tobacco: Never Used   Substance and Sexual Activity     Alcohol use: Yes     Comment: quit 10/2015; now 3-4 Vodkas/night     Drug use: No     Sexual activity: Not Currently   Other Topics Concern     Parent/sibling w/ CABG, MI or angioplasty before 65F 55M? Not Asked   Social History Narrative    , 2 children, retired . He does not have a living will but desires full code.  (last updated 11/29/2017)        Review of Systems:  Skin:  Negative       Eyes:  Negative      ENT:  Negative      Respiratory:  Negative       Cardiovascular:  Negative      Gastroenterology: Negative      Genitourinary:  not assessed      Musculoskeletal:  Positive for muscular weakness lower extremities  Neurologic:  Negative      Psychiatric:  Positive for sleep disturbances    Heme/Lymph/Imm:  Negative      Endocrine:  Negative        Physical Exam:  Vitals: /59   Pulse 58   Ht 1.88 m (6' 2.02\")   Wt 109.3 kg (241 lb)   BMI 30.93 kg/m      Constitutional:  cooperative chronically ill      Skin:  warm and dry to the touch          Head:           Eyes:      sclera non-icteric    Lymph:      ENT:           Neck:           Respiratory:            Cardiac: normal S1 and S2;no S3 or S4       grade 2;systolic murmur                                                 GI:      ascites    Extremities and Muscular Skeletal:        RLE edema;1+;2+   wrapped with vecro compression on right, left BKA.    Neurological:  no gross motor deficits;affect appropriate        Psych:             CC  Main Hardy MD  [id number]  [place] [address]                  "

## 2018-12-19 NOTE — LETTER
"12/19/2018    Main Hardy MD  [address] [facility] [address]  [facility] [address]    RE: Antonio KU Ashley       Dear Colleague,    I had the pleasure of seeing Antonio Ramirez in the [facility].        HPI and Plan:     Mr. Antonio Ramirez was seen in post-hospital follow-up at [facility].  He is 73 years old,  has a complex medical history and was referred 2 months ago by Dr. Hardy to establish care.  Until then, he had been followed through [facility] cardiology. He is here after right hip replacement and at the request of Dr. Tucker of the Hospitalist service for edema and aortic stenosis follow-up.    Mr. Ramirez is doing physical therapy and is at the [facility]. He denies dyspnea or chest discomfort or palpitations. Physical therapy is progressing slowly.  He has persistent right calf edema but is using a Velcro wrap.  His left thigh is also mildly edematous.  He has had a reaccumulation of ascites and is due to have paracentesis in about a week.  His protein is 6.7 and albumin 2.6 - both low.    He has a history of coronary artery disease (CAD), prior coronary artery bypass surgery (CABG),  antiphospholipid syndrome, \"alcoholic\" cirrhosis, moderate mitral regurgitation, non-Hodgkin's (large B-cell) lymphoma, and persistent atrial fibrillation. He also has chronic kidney disease and anemia as well as thrombocytopenia.  He has had falls and experienced a subdural hematoma and craniotomy. He has nephrolithiasis and has had ureteral stenting. This summer, he experienced sepsis with a urinary tract infection.    He has atrial fibrillation but he never had symptoms associated with this. Over the past year, each electrocardiogram has shown atrial fibrillation. However on exams, notes described a pulse  fairly regular and that a heart rate that is controlled.     Currently, Mr. Ramirez denies any shortness of breath. His activity tolerance is limited by his left below the knee " "amputation (apparently from gangrene) and his contralateral hip arthritis.    In the past, Dr. Box switched his diuretic to torsemide. Initially, this resulted in much improved diuresis, and he noted that his edema was better. Lately, he has developed marked lymphedema and ascites.  He has been seen in Lymphedema Clinic.  He has a history of an LAD stent in 2007, followed by urgent bypass later that same year. He denies any recent chest pain.     He had been chronically anticoagulated with warfarin secondary to lower extremity thrombosis in 2007 with placement of an IVC filter. His filter thrombosed as well, and he was eventually diagnosed with antiphospholipid antibody syndrome. He is now on Xarelto 20 mg daily.     A stress test was performed within the last 2 years in [place] and he was reportedly told there was no evidence of ischemia. His ejection fraction at that time was 47%. Earlier this year echocardiography had demonstrated an ejection fraction between 40 to 45%, mitral annular calcification with posterior leaflet restriction and 2+ mitral regurgitation and moderate to severe tricuspid regurgitation. The last echo indicated aortic root enlargement as moderate (and increased) with an LV ejection fraction of 35 to 40%, mild to moderate MR and AS with an DIVINA of 1.2 cm2 (25 mmHg mean).    Exam:  Blood pressure 101/59, pulse 58, height 1.88 m (6' 2.02\"), weight 109.3 kg (241 lb).    Chest was clear and cardiac exam notable for irregularly irregular S1, S2 and 1-2/6 holosystolic murmur, 1/6 LUIS at LSB.    Assessment/Plan:  Mr. Ramirez has a very complex set of medical problems - many of which are interrelated.  To summarize, his cardiology issues seem stable.  They include,    1. coronary artery disease S/P CABG in 2007,  2. atrial fibrillation, asymptomatic and likely permanent,  3. ischemic cardiomyopathy, mild to moderate (EF 40-45%),  4. moderate mitral insufficiency,  5. moderately severe tricuspid " insufficiency,  6. pulmonary hypertension (likely multifactorial),  7. aortic root dilation, mild,  8. aortic stenosis, moderate.    With regard to the atrial fibrillation, he is asymptomatic and appropriately  on anticoagulation (especially given his history of pulmonary emboli).  He does have a fall risk and history of  A subdural bleed after a fall. He is on low dose beta blockade and AV ama blockade without evidence of tachycardia. A future Holter can be considered at a return visit to assess heart rate variability.    With regard to his CAD and prior CABG, he is asymptomatic and on appropriate risk factor intervention. Statin therapy has been discontinued given his intolerance. A PCSK 9 inhibitor is an option. There is not hepatic clearance of the PCSK 9 inhibitors (proteolysis and binding to PCSK elimination) so he could be a candidate for Repatha. He is on low dose aspirin and beta blockade.  He is not on an ACEI or ARB given his   chronic kidney disease.      With regard to his cardiomyopathy, he has no congestive heart failure (CHF) symptoms.  He is on carvedilol but not a vasodilator (as described above).  Given his edema, hydralazine/nitrates may cause more adverse symptoms than benefits.    With regard to his mitral insufficiency, he is asymptomatic and it sounds improved from past assessments.  An echo has been ordered and will be repeated in the future.    His pulmonary hypertension and tricuspid insufficiency are likely related.  Pulmonary hypertension is likely multifactorial and the result of his prior pulmonary embolism, cirrhosis and possibly undiagnosed sleep apnea and/or left heart disease.  In future, a sleep study may be of benefit though he has many more immediate problems.    The mild aortic root dilation will be reassessed with echocardiography.  On the echo of July, 2018, the Sinuses were noted to be 4.3 cm. The ascending aorta was not imaged as distally as the present study which noted  a 4.7 cm vessel. Will consider an MRA though GFR now  41 ml/min./BSA - may be able to do without contrast.    With regard to the aortic stenosis, it is mild to moderate and the mean gradient is only 14 mmHg. It will be followed by echocardiography.    He has other contributory problems including chronic kidney disease, falls, arthritis, peripheral neuropathy, nephrolithiasis, a history of bilateral and saddle pulmonary emboli in 2006, an inferior vena cava (IVC) filter and more recently recurrent lymphedema.      For his lymphedema, he is starting to follow with Lymphedema clinic.  In future, we discussed that he may benefit from a venous insufficiency ultrasound of the legs.  Ablation could be helpful if compression and diuresis alone are not satisfactory.  Again, the  lymphedema is also likely multifactorial and the result of CKD, low oncotic pressure associated with liver disease, impaired mobility, an inferior vena cava filter and anemia.    A follow-up visit has been scheduled.  An echo will be repeated in 4 months.      CURRENT MEDICATIONS:  Current Outpatient Medications   Medication Sig Dispense Refill     aspirin 81 MG tablet Take 81 mg by mouth daily       carvedilol (COREG) 3.125 MG tablet Take 1 tablet (3.125 mg) by mouth 2 times daily (with meals) 180 tablet 1     fluticasone (FLONASE) 50 MCG/ACT spray Spray 1 spray into both nostrils daily       GABAPENTIN PO Take 600 mg by mouth 2 times daily       HYDROmorphone (DILAUDID) 2 MG tablet Take 2 mg by mouth 2 times daily       hydrOXYzine (ATARAX) 10 MG tablet Take 10 mg by mouth every 6 hours       lidocaine (LIDODERM) 5 % patch Place 2 patches onto the skin every 24 hours       nystatin (MYCOSTATIN) 438114 UNIT/GM external cream Apply topically 2 times daily       ondansetron (ZOFRAN-ODT) 4 MG ODT tab Take 1 tablet (4 mg) by mouth every 6 hours as needed for nausea or vomiting 30 tablet 0     polyethylene glycol (MIRALAX/GLYCOLAX) packet Take 17 g by  "mouth daily       Rivaroxaban (XARELTO PO) Take 20 mg by mouth daily       senna-docusate (SENOKOT-S/PERICOLACE) 8.6-50 MG tablet Take 1 tablet by mouth 2 times daily 30 tablet 0     TORSEMIDE PO Take 20 mg by mouth daily         ALLERGIES     Allergies   Allergen Reactions     Ciprofloxacin Itching     Severe itching \"like ants were crawling\"     Hmg-Coa-R Inhibitors Other (See Comments)     Rhabdomyolysis occurred within a couple days       PAST MEDICAL HISTORY:  Past Medical History:   Diagnosis Date     Alcoholism (H)      Amputated left leg (H)      Anemia      Anticardiolipin antibody syndrome (H)      Antiphospholipid antibody syndrome (H)      Antiplatelet or antithrombotic long-term use      Aortic root dilatation (H)      Aortic stenosis      Arthritis      Balance problem      Coronary artery disease     CABG 2007: SVG to LAD, SVG to diagonal, SVG to OM; cardiac cath 5/17/18: thrombectomy for restenosis of stents-sent for urgent CABG, cath 5/14/2007: GRECIA x2 to LAD, Grecia to diagonal     Gastro-oesophageal reflux disease      Heart attack (H) 2007    apparently arrested, cardiac X5     Hiatal hernia      Hypercholesterolemia      Hypertension      Ischemic cardiomyopathy      Left foot drop      Liver disease     alcoholic liver disease, alcoholic cirrohsosis, portal hypertension     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)     ?When diagnosed, patient not aware of this     Moderate mitral regurgitation      Monoclonal gammopathy      Neuropathy      Noninfectious ileitis     diverticulitis of colon     Nonrheumatic tricuspid valve regurgitation      Paroxysmal atrial fibrillation (H)      PE (pulmonary embolism) 2006    saddle emboli 2006     Prostate cancer (H) 2000    Treated with radiation (seed implants in 2000) -- no problems since     Pulmonary hypertension (H)      Renal disease     kidney stones     Syncope      Thrombocytopenia (H)      Urethral stricture      Venous thrombosis     IVC " filter and lysis procedures 2007       PAST SURGICAL HISTORY:  Past Surgical History:   Procedure Laterality Date     AMPUTATE LEG BELOW KNEE Left 04/2014    related to gangrene, started as foot ulcer related to neuropathy     AMPUTATE LEG BELOW KNEE Left 12/4/2017    Procedure: AMPUTATE LEG BELOW KNEE;  Exploration of Left Leg Below Knee Amputation Stump with Ligation of Bleeders.;  Surgeon: Leandro Arreola MD;  Location:  OR     APPENDECTOMY       APPLY WOUND VAC Left 12/6/2017    Procedure: APPLY WOUND VAC;;  Surgeon: Saima Howard MD;  Location:  SD     ARTHROPLASTY HIP Right 11/27/2018    Procedure: RIGHT TOTAL HIP ARTHROPLASTY;  Surgeon: Saima Howard MD;  Location:  OR     ARTHROPLASTY KNEE Right 9/19/2014    Procedure: ARTHROPLASTY KNEE;  Surgeon: Saima Howard MD;  Location:  OR     CARDIAC SURGERY      CABG     CHOLECYSTECTOMY       COLONOSCOPY       COMBINED CYSTOSCOPY, RETROGRADES, EXCHANGE STENT URETER(S) Left 7/24/2018    Procedure: COMBINED CYSTOSCOPY, RETROGRADES, EXCHANGE STENT URETER(S);  CYSTOSCOPY, BILATERAL RETROGRADES, BILATERAL URETERAL STENT EXCHANGE ;  Surgeon: Matt Cervantes MD;  Location:  OR     CRANIOTOMY Right 4/7/2018    Procedure: CRANIOTOMY;  Right Craniotomy For Subdural Hematoma;  Surgeon: Jono Issa MD;  Location:  OR     CYSTOSCOPY, DILATE URETHRA, COMBINED N/A 10/12/2016    Procedure: COMBINED CYSTOSCOPY, DILATE URETHRA;  Surgeon: Dimitry Bello MD;  Location:  OR     CYSTOSCOPY, REMOVE STENT(S), COMBINED Right 7/24/2018    Procedure: COMBINED CYSTOSCOPY, REMOVE STENT(S);;  Surgeon: Jose Hunter MD;  Location:  OR     ENDOSCOPIC STRIPPING VEIN(S)       esophagogastroduodenoscopy       EYE SURGERY      cataracts     GENITOURINARY SURGERY      Prostate Seen Implants     HERNIA REPAIR      Umbilical     INCISION AND DRAINAGE LOWER EXTREMITY, COMBINED Left 12/1/2017    Procedure: COMBINED INCISION AND DRAINAGE  LOWER EXTREMITY;  INCISION AND DRAINAGE OF LEFT BELOW KNEE AMPUTATION ABSCESS, WOUND VAC PLACEMENT;  Surgeon: Saima Howard MD;  Location:  OR     IR IVC FILTER PLACEMENT       IRRIGATION AND DEBRIDEMENT LOWER EXTREMITY, COMBINED Left 2017    Procedure: COMBINED IRRIGATION AND DEBRIDEMENT LOWER EXTREMITY;  IRRIGATION AND DEBRIDEMENT OF LEFT BELOW KNEE AMPUTATION SITE WITH WOUND VAC REPLACEMENT;  Surgeon: Saima Howard MD;  Location:  SD     IRRIGATION AND DEBRIDEMENT LOWER EXTREMITY, COMBINED Left 2017    Procedure: COMBINED IRRIGATION AND DEBRIDEMENT LOWER EXTREMITY;  IRRIGATION AND DEBRIDEMENT OF LEFT BELOW THE KNEE AMPUTATION AND SHORTENING OF THE TIBIA WITH WOUND CLOSURE;  Surgeon: Saima Howard MD;  Location:  OR     LASER HOLMIUM LITHOTRIPSY URETER(S), INSERT STENT, COMBINED Bilateral 2018    Procedure: COMBINED CYSTOSCOPY, URETEROSCOPY, LASER HOLMIUM LITHOTRIPSY URETER(S), INSERT STENT;  CYSTOSCOPY, BILATERAL URETEROSCOPY,  HOLMIUM LASER LITHOTRIPSY, BILATERAL STENT PLACEMENT, STONE BASKETING;  Surgeon: Jose Hunter MD;  Location:  OR     LASER HOLMIUM LITHOTRIPSY URETER(S), INSERT STENT, COMBINED Left 2018    Procedure: COMBINED CYSTOSCOPY, URETEROSCOPY, LASER HOLMIUM LITHOTRIPSY URETER(S), INSERT STENT;  CYSTOSCOPY, LEFT URETEROSCOPY, LEFT LASER HOLMIUM LITHOTRIPSY URETER(S) REMOVAL BILATERAL STENTS;  Surgeon: Jose Hunter MD;  Location:  OR     ORTHOPEDIC SURGERY      (R) knee scope     ORTHOPEDIC SURGERY      total hip      ORTHOPEDIC SURGERY      elbow surgery       FAMILY HISTORY:  Family History   Problem Relation Age of Onset     Lung Cancer Mother      Heart Failure Father          age 87       SOCIAL HISTORY:  Social History     Socioeconomic History     Marital status:      Spouse name: None     Number of children: 2     Years of education: None     Highest education level: None   Social Needs     Financial resource strain: None  "    Food insecurity - worry: None     Food insecurity - inability: None     Transportation needs - medical: None     Transportation needs - non-medical: None   Occupational History     Occupation: Retired    Tobacco Use     Smoking status: Never Smoker     Smokeless tobacco: Never Used   Substance and Sexual Activity     Alcohol use: Yes     Comment: quit 10/2015; now 3-4 Vodkas/night     Drug use: No     Sexual activity: Not Currently   Other Topics Concern     Parent/sibling w/ CABG, MI or angioplasty before 65F 55M? Not Asked   Social History Narrative    , 2 children, retired . He does not have a living will but desires full code.  (last updated 11/29/2017)        Review of Systems:  Skin:  Negative       Eyes:  Negative      ENT:  Negative      Respiratory:  Negative       Cardiovascular:  Negative      Gastroenterology: Negative      Genitourinary:  not assessed      Musculoskeletal:  Positive for muscular weakness lower extremities  Neurologic:  Negative      Psychiatric:  Positive for sleep disturbances    Heme/Lymph/Imm:  Negative      Endocrine:  Negative        Physical Exam:  Vitals: /59   Pulse 58   Ht 1.88 m (6' 2.02\")   Wt 109.3 kg (241 lb)   BMI 30.93 kg/m       Constitutional:  cooperative chronically ill      Skin:  warm and dry to the touch          Head:           Eyes:      sclera non-icteric    Lymph:      ENT:           Neck:           Respiratory:            Cardiac: normal S1 and S2;no S3 or S4       grade 2;systolic murmur                                                 GI:      ascites    Extremities and Muscular Skeletal:        RLE edema;1+;2+   wrapped with vecro compression on right, left BKA.    Neurological:  no gross motor deficits;affect appropriate        Psych:           Thank you for allowing me to participate in the care of your patient.    Sincerely,     Elena Downs MD     [facility]    "

## 2018-12-20 ENCOUNTER — NURSING HOME VISIT (OUTPATIENT)
Dept: GERIATRICS | Facility: CLINIC | Age: 73
End: 2018-12-20
Payer: MEDICARE

## 2018-12-20 VITALS
BODY MASS INDEX: 31.69 KG/M2 | DIASTOLIC BLOOD PRESSURE: 76 MMHG | HEIGHT: 72 IN | WEIGHT: 234 LBS | OXYGEN SATURATION: 94 % | SYSTOLIC BLOOD PRESSURE: 115 MMHG | HEART RATE: 62 BPM | TEMPERATURE: 97.5 F | RESPIRATION RATE: 16 BRPM

## 2018-12-20 DIAGNOSIS — K70.31 ALCOHOLIC CIRRHOSIS OF LIVER WITH ASCITES (H): ICD-10-CM

## 2018-12-20 DIAGNOSIS — F10.10 ALCOHOL ABUSE: ICD-10-CM

## 2018-12-20 DIAGNOSIS — C83.00 MALIGNANT LYMPHOPLASMACYTIC LYMPHOMA (H): ICD-10-CM

## 2018-12-20 DIAGNOSIS — I10 BENIGN ESSENTIAL HYPERTENSION: ICD-10-CM

## 2018-12-20 DIAGNOSIS — D68.61 ANTIPHOSPHOLIPID ANTIBODY SYNDROME (H): ICD-10-CM

## 2018-12-20 DIAGNOSIS — Z89.512 HISTORY OF LEFT BELOW KNEE AMPUTATION (H): ICD-10-CM

## 2018-12-20 DIAGNOSIS — M16.11 OSTEOARTHRITIS OF RIGHT HIP, UNSPECIFIED OSTEOARTHRITIS TYPE: ICD-10-CM

## 2018-12-20 DIAGNOSIS — Z96.641 STATUS POST TOTAL REPLACEMENT OF RIGHT HIP: Primary | ICD-10-CM

## 2018-12-20 DIAGNOSIS — R53.81 PHYSICAL DECONDITIONING: ICD-10-CM

## 2018-12-20 DIAGNOSIS — I48.0 PAROXYSMAL ATRIAL FIBRILLATION (H): ICD-10-CM

## 2018-12-20 DIAGNOSIS — I25.5 ISCHEMIC CARDIOMYOPATHY: ICD-10-CM

## 2018-12-20 DIAGNOSIS — I77.810 AORTIC ROOT DILATATION (H): ICD-10-CM

## 2018-12-20 PROCEDURE — 99309 SBSQ NF CARE MODERATE MDM 30: CPT | Performed by: NURSE PRACTITIONER

## 2018-12-20 ASSESSMENT — MIFFLIN-ST. JEOR: SCORE: 1834.42

## 2018-12-20 NOTE — LETTER
12/20/2018        RE: Antonio Ramirez  [address]  [place] [address]        [facility]    Chief Complaint   Patient presents with     RECHECK       [facility] Medical Record Number:  [id number]  [facility] of Service where encounter took place:  [facility] [[id number]]    HPI:    Antonio Ramirez is a 73 year old  (1945), who is being seen today for an episodic care visit.  HPI information obtained from: patient report, [facility] chart review and family/first contact [place] report.  Current issues are:  .     Status post total replacement of right hip  Osteoarthritis of right hip, unspecified osteoarthritis type  Physical deconditioning  Hx of left below-the-knee amputation  Patient underwent right total hip arthroplasty at [facility] (primary surgeon Dr. Howard) on 11/27/18 d/t hx of osteoarthritis of the right hip. EBL 1200 mL - required 3 units PRBCs inpatient (11/29, 12/1 & 12/5). Dose of Venofer given 12/5 for possible iron-deficiency anemia. Prophy antibiotics x 24-hours post-op. Pain moderately controlled with PO Dilaudid (had been receiving 4 mg doses when experienced Syncope inpatient; now down to 2 mg with reports of poor pain control). Episode of ANSELMO - creatinine up to 2.09, estimated GFR 31 - improved to creatinine of 1.23, estimated GFR 57 12/4/18.     To note, patient previously had a left total arthroplasty and left below-the-knee amputation approximately 5 years ago in [place]. 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 It was noticed during hospitalization that there was a fissure on the left stump and concern for proper wound care (i.e. malodorous, no sock between prosthesis & skin, etc.). Wound care orders from hospital in plan below.      Ischemic cardiomyopathy  Antiphospholipid antibody syndrome (H)  Paroxysmal atrial  fibrillation (H)  Benign essential hypertension  Aortic Root Dilation  Echo 7/11/18 - EF 40-45% with anterior, septal & apical wall akinesis  Patient anticoagulated with chronic Xarelto d/t history of PE presumably related to his antiphospholipid antibody syndrome - held during immediately prior to and during surgery; restarted POD #1.   Coreg for chronic A-Fib.   Syncopal episode w/LOC during hospitalization (11/29/18 & 11/30/18) - etiology multifactorial (pain meds vs. vagal response vs. known aortic stenosis and aortic root dilation vs. Pulmonary hypertension vs. ?); recovered with fluids and RBCs.   Restarted Torsemide 12/2/18; US 12/3 to r/o DVT - recommend compression stockings.     BP: 115/76, 106/65, 104/64    Alcohol abuse  Possible alcoholic cirrhosis of the liver w/ascites  Patient reports h/o alcohol abuse, stopping in 2015 but ETOH level from 10/31/18 was 0.10. Family reports 40+ years of heavy drinking.   Possible alcoholic cirrhosis w/ascites although this was no mentioned in CT imaging; had a paracentesis 10/30/18, 3.3L removed   We did attempt to do paracentesis 12/11 but unable to do due to elevated INR 3.82  During most recent trip to emergency department ammonia level 44.   He is feeling more abdominal fullness  Malignant Lymphoplasmacytic Lymphoma  Diagnosed via BMBX - no evidence of progression in BMBX September 2018. Followed by Dr. Raymond at [facility]. I did discuss elevated INR with Dr Raymond. We will fax results of factor x to [facility]    ALLERGIES: Ciprofloxacin and Hmg-coa-r inhibitors  Past Medical, Surgical, Family and Social History reviewed and updated in [facility].    Current Outpatient Medications   Medication Sig Dispense Refill     aspirin 81 MG tablet Take 81 mg by mouth daily       carvedilol (COREG) 3.125 MG tablet Take 1 tablet (3.125 mg) by mouth 2 times daily (with meals) 180 tablet 1     fluticasone (FLONASE) 50 MCG/ACT spray Spray 1 spray into both nostrils daily        GABAPENTIN PO Take 600 mg by mouth 2 times daily       HYDROmorphone (DILAUDID) 2 MG tablet Take 2 mg by mouth 2 times daily       hydrOXYzine (ATARAX) 10 MG tablet Take 10 mg by mouth every 6 hours       lidocaine (LIDODERM) 5 % patch Place 2 patches onto the skin every 24 hours       nystatin (MYCOSTATIN) 032523 UNIT/GM external cream Apply topically 2 times daily       ondansetron (ZOFRAN-ODT) 4 MG ODT tab Take 1 tablet (4 mg) by mouth every 6 hours as needed for nausea or vomiting 30 tablet 0     polyethylene glycol (MIRALAX/GLYCOLAX) packet Take 17 g by mouth daily       senna-docusate (SENOKOT-S/PERICOLACE) 8.6-50 MG tablet Take 1 tablet by mouth 2 times daily 30 tablet 0     TORSEMIDE PO Take 20 mg by mouth daily       Rivaroxaban (XARELTO PO) Take 20 mg by mouth daily       Medications reviewed:  Medications reconciled to facility chart and changes were made to reflect current medications as identified as above med list. Below are the changes that were made:   Medications stopped since last EPIC medication reconciliation:   There are no discontinued medications.    Medications started since last [facility] medication reconciliation:  No orders of the defined types were placed in this encounter.      REVIEW OF SYSTEMS:  4 point ROS including Respiratory, CV, GI and , other than that noted in the HPI,  is negative    Physical Exam:  /76   Pulse 62   Temp 97.5  F (36.4  C)   Resp 16   Ht 1.829 m (6')   Wt 106.1 kg (234 lb)   SpO2 94%   BMI 31.74 kg/m     GENERAL APPEARANCE:  Alert, in no distress  ENT:  Mouth and posterior oropharynx normal, moist mucous membranes, hearing acuity intact  EYES:  EOM, conjunctivae, lids, pupils and irises normal  NECK:  No adenopathy,masses or thyromegaly  RESP:  respiratory effort normal, no respiratory distress, Lung sounds clear throughout  CV:  rate and rhythm irregularly irregular rhythm (chronic a-fib), Grade 3 systolic murmur best heard over the RUSB, but present  throughout, no rub or gallop, Edema 2+ in the right foot and 2+ in the entire right leg. Lymphedema wrap in place.   ABDOMEN:  normal bowel sounds, tense, nontender; distended r/t ascites  M/S:   Gait and station not observed, Digits and nails with mild thickening, no clubbing  SKIN: extensive bruising around the right hip, non-blanchable, incision with scant dried bloody drainage and sanguinous drainage on bandages, no expanding erythema or edema around incision. Left stump with approximately 4 cm x 2 cm crusting fissure - no expanding erythema or edema, mild ecchymosis near stump, blanchable.   NEURO: 2-12 in normal limits and at patient's baseline  PSYCH:  Insight, judgement, and memory intact, affect and mood normal    Recent Labs:  CBC RESULTS:   Recent Labs   Lab Test 12/13/18  1247 12/07/18  0700   WBC 6.1 7.4   RBC 2.48* 2.93*   HGB 7.7* 9.0*   HCT 22.9* 26.6*   MCV 92 91   MCH 31.0 30.7   MCHC 33.6 33.8   RDW 17.1* 16.5*    190       Last Basic Metabolic Panel:  Recent Labs   Lab Test 12/13/18  1247 12/11/18  1311    137   POTASSIUM 4.6 4.3   CHLORIDE 101 101   BENNIE 8.5 8.6   CO2 29 28   BUN 44* 42*   CR 1.63* 1.42*   * 109*       Liver Function Studies -   Recent Labs   Lab Test 12/13/18  1247 12/11/18  1311   PROTTOTAL 6.7* 6.7*   ALBUMIN 2.6* 2.7*   BILITOTAL 2.3* 2.2*   ALKPHOS 106 95   AST 47* 38   ALT 16 13       TSH   Date Value Ref Range Status   10/31/2018 0.86 0.40 - 4.00 mU/L Final   10/31/2018 Canceled, Test credited, specimen discarded 0.40 - 4.00 mU/L Final     Comment:     Unsatisfactory specimen - hemolyzed   ]    Lab Results   Component Value Date    A1C 4.6 05/18/2018       Assessment/Plan:  Malaise:   Comment; patient sent to ED yesterday due to increased confusion and malaise. Work up in ED was negative for anything acute. He is more alert today and following directions.   Plan: reduce dilaudid 2mg to daily    (Z96.641) Status post total replacement of right hip   (primary encounter diagnosis)  (M16.11) Osteoarthritis of right hip, unspecified osteoarthritis type  (R53.81) Physical deconditioning  (K59.00) Constipation  Comment:  s/p right TKA. Recent reduction in dilaudid.  Patient did return to see Dr Howard-recommend more physical therapy   Goal is to return home when he has made progess with therapy  Plan: physical therapy   Reduce dilaudid to 2mg daily for 5 d then DISCONTINUE.   DISCONTINUE hydroxyzine and zofran- not using    (Z89.512) History of left below knee amputation (H)  Comment: 4 cm x 2 cm fissure with brown crusting, no expanding erythema, edema or increased pain.   Plan: Plan of care for left stump: 2x/ day   1. Clean with gentle soap and water, dry and dry again.  Make sure is dry before putting on prosthetic..  2. Apply lotion down to the very distal aspect of the stump but not on the very bottom/ distal aspect- want that to become dry and callused  3. Apply very small dab of Vaseline just where the fissure is.  Goal is to keep that just a bit moist so it can heal but not moisten the surround callus  4. Cover distal stump with one, dry 4x4 then a 4x4 that is opened and conformed to the stump  5. Wear a washable sleeve  6. Keep rubbery prosthetic clean    (I25.5) Ischemic cardiomyopathy  Comment: EF 35-40%. He did return to see Dr Tapia on 12/19-   Plan: Torsemide 20 mg daily - monitor RLE edema and HF. Repeat echo in 4 mos  (D68.61) Antiphospholipid antibody syndrome (H)  Comment: Hx of PE; on chronic anticoagulant. recent elevated INR. discussed with Dr Raymond he recommends checking chromogenic factor X  Plan: Continue Xarelto and baby aspirin. chromogenic factor x results to be sent to Dr Raymond    (I10) Benign essential hypertension  (I48.0) Paroxysmal A-fib  (I77.810) Aortic root dilatation (H)  Comment: no change in status, h/o SDH. He continues on xarelto  Plan: Continue Coreg, xarelto    (F10.10) Alcohol abuse  (K70.31) Alcoholic cirrhosis of  liver with ascites (H)  Comment: no signs of alcohol withdrawal; patient's abdomen is taught r/t fluid/ascites. Used to do paracentesis every 2 weeks in [place]; states not as frequently here has he needs new orders every time. Last paracentesis 10/31/18. Bilirubin 3.0 on 12/7/18 and 2.2 on 12/11 (baseline in hospital < 1.0.) He does feel like belly is more tense now.   Plan: schedule paracentesis for 12/21. Will hold xarelto for two days prior to procedure    (C83.00) Malignant lymphoplasmacytic lymphoma (H)  Comment: Followed at [facility]; consider role of Lymphoma in anemia post-op; CBC stable 12/7 (hgb 9.0).   Plan: last seen 10/25/18 - follow-up in 6 months (April 2019).         Electronically signed by  DIPAK Huggins CNP                      Sincerely,        DIPAK Huggins CNP

## 2018-12-20 NOTE — PROGRESS NOTES
[facility]    Chief Complaint   Patient presents with     ABDIELTUCKER       [facility] Record Number:  [id number]  [facility] took place[facility] [[id number]]    HPI:    Antonio Ramirez is a 73 year old  (1945), who is being seen today for an episodic care visit.  HPI information obtained from: patient report, [facility] chart review and family/first contact [place] report.  Current issues are:  .     Status post total replacement of right hip  Osteoarthritis of right hip, unspecified osteoarthritis type  Physical deconditioning  Hx of left below-the-knee amputation  Patient underwent right total hip arthroplasty at [facility] (primary surgeon Dr. Howard) on 11/27/18 d/t hx of osteoarthritis of the right hip. EBL 1200 mL - required 3 units PRBCs inpatient (11/29, 12/1 & 12/5). Dose of Venofer given 12/5 for possible iron-deficiency anemia. Prophy antibiotics x 24-hours post-op. Pain moderately controlled with PO Dilaudid (had been receiving 4 mg doses when experienced Syncope inpatient; now down to 2 mg with reports of poor pain control). Episode of ANSELMO - creatinine up to 2.09, estimated GFR 31 - improved to creatinine of 1.23, estimated GFR 57 12/4/18.     To note, patient previously had a left total arthroplasty and left below-the-knee amputation approximately 5 years ago in [place]. 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 It was noticed during hospitalization that there was a fissure on the left stump and concern for proper wound care (i.e. malodorous, no sock between prosthesis & skin, etc.). Wound care orders from hospital in plan below.      Ischemic cardiomyopathy  Antiphospholipid antibody syndrome (H)  Paroxysmal atrial fibrillation (H)  Benign essential hypertension  Aortic Root Dilation  Echo 7/11/18 - EF 40-45% with  anterior, septal & apical wall akinesis  Patient anticoagulated with chronic Xarelto d/t history of PE presumably related to his antiphospholipid antibody syndrome - held during immediately prior to and during surgery; restarted POD #1.   Coreg for chronic A-Fib.   Syncopal episode w/LOC during hospitalization (11/29/18 & 11/30/18) - etiology multifactorial (pain meds vs. vagal response vs. known aortic stenosis and aortic root dilation vs. Pulmonary hypertension vs. ?); recovered with fluids and RBCs.   Restarted Torsemide 12/2/18; US 12/3 to r/o DVT - recommend compression stockings.     BP: 115/76, 106/65, 104/64    Alcohol abuse  Possible alcoholic cirrhosis of the liver w/ascites  Patient reports h/o alcohol abuse, stopping in 2015 but ETOH level from 10/31/18 was 0.10. Family reports 40+ years of heavy drinking.   Possible alcoholic cirrhosis w/ascites although this was no mentioned in CT imaging; had a paracentesis 10/30/18, 3.3L removed   We did attempt to do paracentesis 12/11 but unable to do due to elevated INR 3.82  During most recent trip to emergency department ammonia level 44.   He is feeling more abdominal fullness  Malignant Lymphoplasmacytic Lymphoma  Diagnosed via BMBX - no evidence of progression in BMBX September 2018. Followed by Dr. Raymond at [facility]. I did discuss elevated INR with Dr Raymond. We will fax results of factor x to [facility]    ALLERGIES: Ciprofloxacin and Hmg-coa-r inhibitors  Past Medical, Surgical, Family and Social History reviewed and updated in [facility].    Current Outpatient Medications   Medication Sig Dispense Refill     aspirin 81 MG tablet Take 81 mg by mouth daily       carvedilol (COREG) 3.125 MG tablet Take 1 tablet (3.125 mg) by mouth 2 times daily (with meals) 180 tablet 1     fluticasone (FLONASE) 50 MCG/ACT spray Spray 1 spray into both nostrils daily       GABAPENTIN PO Take 600 mg by mouth 2 times daily       HYDROmorphone (DILAUDID) 2 MG tablet Take 2 mg by  mouth 2 times daily       hydrOXYzine (ATARAX) 10 MG tablet Take 10 mg by mouth every 6 hours       lidocaine (LIDODERM) 5 % patch Place 2 patches onto the skin every 24 hours       nystatin (MYCOSTATIN) 855768 UNIT/GM external cream Apply topically 2 times daily       ondansetron (ZOFRAN-ODT) 4 MG ODT tab Take 1 tablet (4 mg) by mouth every 6 hours as needed for nausea or vomiting 30 tablet 0     polyethylene glycol (MIRALAX/GLYCOLAX) packet Take 17 g by mouth daily       senna-docusate (SENOKOT-S/PERICOLACE) 8.6-50 MG tablet Take 1 tablet by mouth 2 times daily 30 tablet 0     TORSEMIDE PO Take 20 mg by mouth daily       Rivaroxaban (XARELTO PO) Take 20 mg by mouth daily       Medications reviewed:  Medications reconciled to facility chart and changes were made to reflect current medications as identified as above med list. Below are the changes that were made:   Medications stopped since last EPIC medication reconciliation:   There are no discontinued medications.    Medications started since last [facility] medication reconciliation:  No orders of the defined types were placed in this encounter.      REVIEW OF SYSTEMS:  4 point ROS including Respiratory, CV, GI and , other than that noted in the HPI,  is negative    Physical Exam:  /76   Pulse 62   Temp 97.5  F (36.4  C)   Resp 16   Ht 1.829 m (6')   Wt 106.1 kg (234 lb)   SpO2 94%   BMI 31.74 kg/m    GENERAL APPEARANCE:  Alert, in no distress  ENT:  Mouth and posterior oropharynx normal, moist mucous membranes, hearing acuity intact  EYES:  EOM, conjunctivae, lids, pupils and irises normal  NECK:  No adenopathy,masses or thyromegaly  RESP:  respiratory effort normal, no respiratory distress,   CV: Edema 2+ in the right foot and 2+ in the entire right leg. Lymphedema wrap in place.   ABDOMEN:  normal bowel sounds, tense, nontender; distended   M/S:   Gait and station not observed, Digits and nails with mild thickening, no clubbing  SKIN: right hip   Incision has no redness but there is scant yellow drainage. Right side of abd has tense edema.    NEURO: 2-12 in normal limits and at patient's baseline  PSYCH:  Insight, judgement, and memory intact, affect and mood normal    Recent Labs:  CBC RESULTS:   Recent Labs   Lab Test 12/13/18  1247 12/07/18  0700   WBC 6.1 7.4   RBC 2.48* 2.93*   HGB 7.7* 9.0*   HCT 22.9* 26.6*   MCV 92 91   MCH 31.0 30.7   MCHC 33.6 33.8   RDW 17.1* 16.5*    190       Last Basic Metabolic Panel:  Recent Labs   Lab Test 12/13/18  1247 12/11/18  1311    137   POTASSIUM 4.6 4.3   CHLORIDE 101 101   BENNIE 8.5 8.6   CO2 29 28   BUN 44* 42*   CR 1.63* 1.42*   * 109*       Liver Function Studies -   Recent Labs   Lab Test 12/13/18  1247 12/11/18  1311   PROTTOTAL 6.7* 6.7*   ALBUMIN 2.6* 2.7*   BILITOTAL 2.3* 2.2*   ALKPHOS 106 95   AST 47* 38   ALT 16 13       TSH   Date Value Ref Range Status   10/31/2018 0.86 0.40 - 4.00 mU/L Final   10/31/2018 Canceled, Test credited, specimen discarded 0.40 - 4.00 mU/L Final     Comment:     Unsatisfactory specimen - hemolyzed   ]    Lab Results   Component Value Date    A1C 4.6 05/18/2018       Assessment/Plan:  Malaise:   Comment; patient sent to ED yesterday due to increased confusion and malaise. Work up in ED was negative for anything acute. He is more alert today and following directions.   Plan: reduce dilaudid 2mg to daily    (Z96.641) Status post total replacement of right hip  (primary encounter diagnosis)  (M16.11) Osteoarthritis of right hip, unspecified osteoarthritis type  (R53.81) Physical deconditioning  (K59.00) Constipation  Comment:  s/p right TKA. Recent reduction in dilaudid.  Patient did return to see Dr Howard-recommend more physical therapy   Goal is to return home when he has made progess with therapy  Plan: physical therapy   Reduce dilaudid to 2mg daily for 5 d then DISCONTINUE.   DISCONTINUE hydroxyzine and zofran- not using    (Z89.512) History of left below  knee amputation (H)  Comment: 4 cm x 2 cm fissure with brown crusting, no expanding erythema, edema or increased pain.   Plan: Plan of care for left stump: 2x/ day   1. Clean with gentle soap and water, dry and dry again.  Make sure is dry before putting on prosthetic..  2. Apply lotion down to the very distal aspect of the stump but not on the very bottom/ distal aspect- want that to become dry and callused  3. Apply very small dab of Vaseline just where the fissure is.  Goal is to keep that just a bit moist so it can heal but not moisten the surround callus  4. Cover distal stump with one, dry 4x4 then a 4x4 that is opened and conformed to the stump  5. Wear a washable sleeve  6. Keep rubbery prosthetic clean    (I25.5) Ischemic cardiomyopathy  Comment: EF 35-40%. He did return to see Dr Tapia on 12/19-   Plan: Torsemide 20 mg daily - monitor RLE edema and HF. Repeat echo in 4 mos  (D68.61) Antiphospholipid antibody syndrome (H)  Comment: Hx of PE; on chronic anticoagulant. recent elevated INR. discussed with Dr Raymond he recommends checking chromogenic factor X  Plan: Continue Xarelto and baby aspirin. chromogenic factor x results to be sent to Dr Raymond    (I10) Benign essential hypertension  (I48.0) Paroxysmal A-fib  (I77.810) Aortic root dilatation (H)  Comment: no change in status, h/o SDH. He continues on xarelto  Plan: Continue Coreg, xarelto    (F10.10) Alcohol abuse  (K70.31) Alcoholic cirrhosis of liver with ascites (H)  Comment: no signs of alcohol withdrawal; patient's abdomen is taught r/t fluid/ascites. Used to do paracentesis every 2 weeks in [place]; states not as frequently here has he needs new orders every time. Last paracentesis 10/31/18. Bilirubin 3.0 on 12/7/18 and 2.2 on 12/11 (baseline in hospital < 1.0.) He does feel like belly is more tense now.   Plan: schedule paracentesis for 12/21. Will hold xarelto for two days prior to procedure    (C83.00) Malignant lymphoplasmacytic lymphoma  (H)  Comment: Followed at [facility]; consider role of Lymphoma in anemia post-op; CBC stable 12/7 (hgb 9.0).   Plan: last seen 10/25/18 - follow-up in 6 months (April 2019).         Electronically signed by  DIPAK Huggins CNP

## 2018-12-21 ENCOUNTER — HOSPITAL ENCOUNTER (OUTPATIENT)
Dept: ULTRASOUND IMAGING | Facility: CLINIC | Age: 73
End: 2018-12-21
Attending: INTERNAL MEDICINE | Admitting: INTERNAL MEDICINE
Payer: MEDICARE

## 2018-12-21 ENCOUNTER — HOSPITAL ENCOUNTER (OUTPATIENT)
Facility: CLINIC | Age: 73
Discharge: HOME | End: 2018-12-21
Attending: INTERNAL MEDICINE | Admitting: INTERNAL MEDICINE
Payer: MEDICARE

## 2018-12-21 VITALS
DIASTOLIC BLOOD PRESSURE: 55 MMHG | SYSTOLIC BLOOD PRESSURE: 107 MMHG | HEART RATE: 48 BPM | RESPIRATION RATE: 16 BRPM | TEMPERATURE: 97.7 F | OXYGEN SATURATION: 92 %

## 2018-12-21 LAB — INR PPP: 1.85 (ref 0.86–1.14)

## 2018-12-21 PROCEDURE — 40000863 ZZH STATISTIC RADIOLOGY XRAY, US, CT, MAR, NM

## 2018-12-21 PROCEDURE — 85610 PROTHROMBIN TIME: CPT | Performed by: INTERNAL MEDICINE

## 2018-12-21 PROCEDURE — 36415 COLL VENOUS BLD VENIPUNCTURE: CPT | Performed by: INTERNAL MEDICINE

## 2018-12-21 PROCEDURE — 25000125 ZZHC RX 250: Performed by: RADIOLOGY

## 2018-12-21 PROCEDURE — 27210190 US PARACENTESIS

## 2018-12-21 RX ORDER — NICOTINE POLACRILEX 4 MG
15-30 LOZENGE BUCCAL
Status: DISCONTINUED | OUTPATIENT
Start: 2018-12-21 | End: 2018-12-21 | Stop reason: HOSPADM

## 2018-12-21 RX ORDER — DEXTROSE MONOHYDRATE 25 G/50ML
25-50 INJECTION, SOLUTION INTRAVENOUS
Status: DISCONTINUED | OUTPATIENT
Start: 2018-12-21 | End: 2018-12-21 | Stop reason: HOSPADM

## 2018-12-21 RX ORDER — NICOTINE POLACRILEX 4 MG
15-30 LOZENGE BUCCAL
Status: CANCELLED | OUTPATIENT
Start: 2018-12-21

## 2018-12-21 RX ORDER — LIDOCAINE 40 MG/G
CREAM TOPICAL
Status: DISCONTINUED | OUTPATIENT
Start: 2018-12-21 | End: 2018-12-21 | Stop reason: HOSPADM

## 2018-12-21 RX ORDER — DEXTROSE MONOHYDRATE 25 G/50ML
25-50 INJECTION, SOLUTION INTRAVENOUS
Status: CANCELLED | OUTPATIENT
Start: 2018-12-21

## 2018-12-21 RX ORDER — LIDOCAINE HYDROCHLORIDE 10 MG/ML
10 INJECTION, SOLUTION EPIDURAL; INFILTRATION; INTRACAUDAL; PERINEURAL ONCE
Status: COMPLETED | OUTPATIENT
Start: 2018-12-21 | End: 2018-12-21

## 2018-12-21 RX ADMIN — LIDOCAINE HYDROCHLORIDE 10 ML: 10 INJECTION, SOLUTION EPIDURAL; INFILTRATION; INTRACAUDAL; PERINEURAL at 14:37

## 2018-12-21 NOTE — DISCHARGE INSTRUCTIONS

## 2018-12-21 NOTE — IP AVS SNAPSHOT
MRN:[id number]                      After Visit Summary   12/21/2018    Antonio Ramirez    MRN: [id number]           Visit Information        Department      12/21/2018 12:27 PM [facility]          Review of your medicines      UNREVIEWED medicines. Ask your doctor about these medicines       Dose / Directions   aspirin 81 MG tablet  Commonly known as:  ASA      Dose:  81 mg  Take 81 mg by mouth daily  Refills:  0     carvedilol 3.125 MG tablet  Commonly known as:  COREG      Dose:  3.125 mg  Take 1 tablet (3.125 mg) by mouth 2 times daily (with meals)  Quantity:  180 tablet  Refills:  1     fluticasone 50 MCG/ACT nasal spray  Commonly known as:  FLONASE      Dose:  1 spray  Spray 1 spray into both nostrils daily  Refills:  0     GABAPENTIN PO      Dose:  600 mg  Take 600 mg by mouth 2 times daily  Refills:  0     HYDROmorphone 2 MG tablet  Commonly known as:  DILAUDID      Dose:  2 mg  Take 2 mg by mouth daily  Refills:  0     lidocaine 5 % patch  Commonly known as:  LIDODERM      Dose:  2 patch  Place 2 patches onto the skin every 24 hours  Refills:  0     nystatin 542364 UNIT/GM external cream  Commonly known as:  MYCOSTATIN      Apply topically 2 times daily  Refills:  0     polyethylene glycol packet  Commonly known as:  MIRALAX/GLYCOLAX      Dose:  17 g  Take 17 g by mouth daily  Refills:  0     senna-docusate 8.6-50 MG tablet  Commonly known as:  SENOKOT-S/PERICOLACE  Used for:  Status post total replacement of right hip      Dose:  1 tablet  Take 1 tablet by mouth 2 times daily  Quantity:  30 tablet  Refills:  0     TORSEMIDE PO      Dose:  20 mg  Take 20 mg by mouth daily  Refills:  0     XARELTO PO      Dose:  20 mg  Take 20 mg by mouth daily On hold for 12/20 and 12/21 due to paracentesis  Refills:  0              Protect others around you: Learn how to safely use, store and throw away your medicines at www.disposemymeds.org.       Follow-ups after your visit        Your next 10 appointments already scheduled    Dec 21, 2018  2:00 PM CST  US PARACENTESIS with BRYANUS4, SH IMAGING NURSE, SH BODY RAD  [facility] Ultrasound ([facility]) [address] [address] [phone]   How do I prepare for my exam? (Food and drink instructions) No Food and Drink Restrictions.  How do I prepare for my exam? (Other instructions) IF YOUR DOCTOR ALSO PRESCRIBED SEDATION DURING THE EXAM (medicine to help you relax): You will receive separate instructions about driving, eating, and additional tests that may be necessary prior to your exam day.  What should I wear: Wear comfortable clothes.  How long does the exam take: Aspiration (no sedation treatment takes about an hour.  For paracentesis, thoracentesis or sedation plan to spend at least three hours at the hospital.  What should I bring: Bring a list of your medicines, including vitamins, minerals and over-the-counter drugs. It is safest to leave personal items at home.  Do I need a :  No  is needed.  What do I need to tell my doctor: Tell your doctor in advance: * If you are or may be pregnant. * If you are taking Coumadin (or any other blood thinners) 5 days prior to the exam for any special instructions. * If you are diabetic to determine if your insulin needs have to be adjusted for the exam.  What should I do after the exam: Take it easy the rest of the day. You can return to normal activities the next day.  What is this test: This test uses a long, thin needle or tube to remove tissue, fluid, or other cells from your body. Pictures from an ultrasound will guide the needle to the right place. (Ultrasound uses sound waves to create pictures of the body on a video screen. You will not feel the sound waves.)  * A biopsy removes tissue or other cells from the body; we send the tissue or cells to a lab for testing. * Paracentesis removes fluid from the belly (abdomen) to relieve  pressure, to test the fluid or both. * Thoracentesis removes fluid from the sac around the lungs to relieve pressure, to test the fluid or both. * Aspiration removes fluid from any part of the body, then the fluid is tested for disease or infection.  Who should I call with questions: If you have any questions, please call the Imaging Department where you will have your exam. Directions, parking instructions, and other information is available on our website, [email or web address].      Care Instructions       Further instructions from your care team       Paracentesis Discharge Instructions     After you go home:      You may resume your normal diet.    Care of Puncture Site:      For the first 48 hrs, check your puncture site every couple hours while you are awake     If there is a bandaid - you may remove it tomorrow morning    You may shower tomorrow    No tub baths, whirlpools or swimming until your puncture site has fully healed    Fluid may leak from the site. Change the bandaid as needed - keep the site dry    If the fluid leaks for more than 48 hours, call your ordering provider     Activity:      You may go back to normal activity in 24 hours     Wait 48 hours before lifting, straining, exercise or other strenuous activity    Medicines:      You may resume all your medications    For minor pain, you may take Acetaminophen (Tylenol) or Ibuprofen (Advil)            Call the provider who ordered this procedure if:      The site is red, swollen, hot or tender    Blood or fluid is draining from the site    Chills or a fever greater than 101 F (38 C)    Pain that is getting worse    Leaking from the site that does not stop    Any questions or concerns      If you have questions call:        [facility] Radiology Dept @ [phone]        Additional Information About Your Visit       [employer] Information    [employer] gives you secure access to your electronic health record. If you see a primary care provider,  you can also send messages to your care team and make appointments. If you have questions, please call your primary care clinic.  If you do not have a primary care provider, please call [phone] and they will assist you.       Care EveryWhere ID    This is your Care EveryWhere ID. This could be used by other organizations to access your [facility] medical records  [id number]       Your Vitals Were     Blood Pressure   112/63 (BP Location: Right arm)    Pulse   51    Temperature   97.7  F (36.5  C)    Respirations   16    Pulse Oximetry   98%          Primary Care Provider Office Phone # Fax #    [facility] [phone]      Equal Access to Services    [facility] : Hadii peyton Cabrera, watamika grande, qaedwin kaalmadenise galloway, igor miller . So [facility] [phone].    ATENCIÓN: Si habla español, tiene a shrestha disposición servicios gratuitos de asistencia lingüística. Llame al [phone].    We comply with applicable federal civil rights laws and [place] laws. We do not discriminate on the basis of race, color, national origin, age, disability, sex, sexual orientation, or gender identity.           Thank you!    Thank you for choosing [facility] for your care. Our goal is always to provide you with excellent care. Hearing back from our patients is one way we can continue to improve our services. Please take a few minutes to complete the written survey that you may receive in the mail after you visit with us. Thank you!            Medication List      ASK your doctor about these medications          Morning Afternoon Evening Bedtime As Needed    aspirin 81 MG tablet  Commonly known as:  ASA  Take 81 mg by mouth daily                     carvedilol 3.125 MG tablet  Commonly known as:  COREG  Take 1 tablet (3.125 mg) by mouth 2 times daily (with meals)                     fluticasone 50 MCG/ACT nasal spray  Commonly known as:  FLONASE  Spray 1 spray into  both nostrils daily                     GABAPENTIN PO  Take 600 mg by mouth 2 times daily                     HYDROmorphone 2 MG tablet  Commonly known as:  DILAUDID  Take 2 mg by mouth daily                     lidocaine 5 % patch  Commonly known as:  LIDODERM  Place 2 patches onto the skin every 24 hours                     nystatin 531799 UNIT/GM external cream  Commonly known as:  MYCOSTATIN  Apply topically 2 times daily                     polyethylene glycol packet  Commonly known as:  MIRALAX/GLYCOLAX  Take 17 g by mouth daily                     senna-docusate 8.6-50 MG tablet  Commonly known as:  SENOKOT-S/PERICOLACE  Take 1 tablet by mouth 2 times daily                     TORSEMIDE PO  Take 20 mg by mouth daily                     XARELTO PO  Take 20 mg by mouth daily On hold for 12/20 and 12/21 due to paracentesis

## 2018-12-21 NOTE — IP AVS SNAPSHOT
[place] [address] [place] [address]  Phone:  [phone]                                    After Visit Summary   12/21/2018    Antonio Ramirez    MRN: [id number]           After Visit Summary Signature Page    I have received my discharge instructions, and my questions have been answered. I have discussed any challenges I see with this plan with the nurse or doctor.    ..........................................................................................................................................  Patient/Patient Representative Signature      ..........................................................................................................................................  Patient Representative Print Name and Relationship to Patient    ..................................................               ................................................  Date                                   Time    ..........................................................................................................................................  Reviewed by Signature/Title    ...................................................              ..............................................  Date                                               Time          22EPIC Rev 08/18

## 2018-12-21 NOTE — PROGRESS NOTES
1420 Time Out done.    Paracentesis: Pt tolerated well. VSS. 4200 cc dark red fluid removed from abdomen w/o difficulty. Bandaid applied to site - CDI.  1500 Back to CS per cart. Report to RN.

## 2018-12-21 NOTE — PROCEDURES
RADIOLOGY POST PROCEDURE NOTE    Patient name: Antonio Ramirez  MRN: [id number]  : 1945    Pre-procedure diagnosis: ascites  Post-procedure diagnosis: Same    Procedure Date/Time: 2018  2:47 PM  Procedure: paracentesis  Estimated blood loss: None  Specimen(s) collected with description: ascites    The patient tolerated the procedure well with no immediate complications.  Significant findings:ascites    See imaging dictation for procedural details.    Provider name: Matt Canales  Assistant(s):None

## 2018-12-26 ENCOUNTER — NURSING HOME VISIT (OUTPATIENT)
Dept: GERIATRICS | Facility: CLINIC | Age: 73
End: 2018-12-26
Payer: MEDICARE

## 2018-12-26 VITALS
RESPIRATION RATE: 18 BRPM | HEART RATE: 48 BPM | BODY MASS INDEX: 32.21 KG/M2 | WEIGHT: 237.8 LBS | DIASTOLIC BLOOD PRESSURE: 62 MMHG | TEMPERATURE: 98.6 F | SYSTOLIC BLOOD PRESSURE: 117 MMHG | HEIGHT: 72 IN | OXYGEN SATURATION: 97 %

## 2018-12-26 DIAGNOSIS — I48.0 PAROXYSMAL ATRIAL FIBRILLATION (H): ICD-10-CM

## 2018-12-26 DIAGNOSIS — D68.61 ANTIPHOSPHOLIPID ANTIBODY SYNDROME (H): ICD-10-CM

## 2018-12-26 DIAGNOSIS — M16.11 OSTEOARTHRITIS OF RIGHT HIP, UNSPECIFIED OSTEOARTHRITIS TYPE: ICD-10-CM

## 2018-12-26 DIAGNOSIS — R53.81 PHYSICAL DECONDITIONING: ICD-10-CM

## 2018-12-26 DIAGNOSIS — K70.31 ALCOHOLIC CIRRHOSIS OF LIVER WITH ASCITES (H): ICD-10-CM

## 2018-12-26 DIAGNOSIS — I25.5 ISCHEMIC CARDIOMYOPATHY: ICD-10-CM

## 2018-12-26 DIAGNOSIS — Z96.641 STATUS POST TOTAL REPLACEMENT OF RIGHT HIP: Primary | ICD-10-CM

## 2018-12-26 DIAGNOSIS — F10.10 ALCOHOL ABUSE: ICD-10-CM

## 2018-12-26 DIAGNOSIS — C83.00 MALIGNANT LYMPHOPLASMACYTIC LYMPHOMA (H): ICD-10-CM

## 2018-12-26 DIAGNOSIS — I10 BENIGN ESSENTIAL HYPERTENSION: ICD-10-CM

## 2018-12-26 DIAGNOSIS — I77.810 AORTIC ROOT DILATATION (H): ICD-10-CM

## 2018-12-26 DIAGNOSIS — Z89.512 HISTORY OF LEFT BELOW KNEE AMPUTATION (H): ICD-10-CM

## 2018-12-26 PROCEDURE — 99309 SBSQ NF CARE MODERATE MDM 30: CPT | Performed by: NURSE PRACTITIONER

## 2018-12-26 ASSESSMENT — MIFFLIN-ST. JEOR: SCORE: 1851.65

## 2018-12-26 NOTE — LETTER
12/26/2018        RE: Antonio Ramirez  [address]  [place] [address]        [facility]    Chief Complaint   Patient presents with     RECHECK       [facility] Medical Record Number:  [id number]  Place of Service where encounter took place:  [facility] [[id number]]    HPI:    Antonio Ramirez is a 73 year old  (1945), who is being seen today for an episodic care visit.  HPI information obtained from: patient report, [facility] chart review and family/first contact [place] report.  Current issues are:  .     Status post total replacement of right hip  Osteoarthritis of right hip, unspecified osteoarthritis type  Physical deconditioning  Hx of left below-the-knee amputation  Patient underwent right total hip arthroplasty at [facility] (primary surgeon Dr. Howard) on 11/27/18 d/t hx of osteoarthritis of the right hip. EBL 1200 mL - required 3 units PRBCs inpatient (11/29, 12/1 & 12/5). Dose of Venofer given 12/5 for possible iron-deficiency anemia. Prophy antibiotics x 24-hours post-op. Pain moderately controlled with PO Dilaudid (had been receiving 4 mg doses when experienced Syncope inpatient; now down to 2 mg with reports of poor pain control). Episode of ANSELMO - creatinine up to 2.09, estimated GFR 31 - improved to creatinine of 1.23, estimated GFR 57 12/4/18.     To note, patient previously had a left total arthroplasty and left below-the-knee amputation approximately 5 years ago in [place]. 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 It was noticed during hospitalization that there was a fissure on the left stump and concern for proper wound care (i.e. malodorous, no sock between prosthesis & skin, etc.). Wound care orders from hospital in plan below.      Ischemic cardiomyopathy  Antiphospholipid antibody syndrome (H)  Paroxysmal atrial  fibrillation (H)  Benign essential hypertension  Aortic Root Dilation  Echo 7/11/18 - EF 40-45% with anterior, septal & apical wall akinesis  Patient anticoagulated with chronic Xarelto d/t history of PE presumably related to his antiphospholipid antibody syndrome - held during immediately prior to and during surgery; restarted POD #1.   Coreg for chronic A-Fib.   Syncopal episode w/LOC during hospitalization (11/29/18 & 11/30/18) - etiology multifactorial (pain meds vs. vagal response vs. known aortic stenosis and aortic root dilation vs. Pulmonary hypertension vs. ?); recovered with fluids and RBCs.   Restarted Torsemide 12/2/18; US 12/3 to r/o DVT - recommend compression stockings.     BP: 113/66, 117/62, 94/56    Alcohol abuse  Possible alcoholic cirrhosis of the liver w/ascites  Patient reports h/o alcohol abuse, stopping in 2015 but ETOH level from 10/31/18 was 0.10. Family reports 40+ years of heavy drinking.   Possible alcoholic cirrhosis w/ascites although this was no mentioned in CT imaging; had a paracentesis 10/30/18, 3.3L removed   He did return to radiology on 12/21 for paracentesis- 4250 ml was removed.     Malignant Lymphoplasmacytic Lymphoma  Diagnosed via [facility] - no evidence of progression in [facility] September 2018.     ALLERGIES: Ciprofloxacin and Hmg-coa-r inhibitors  Past Medical, Surgical, Family and Social History reviewed and updated in [facility].    Current Outpatient Medications   Medication Sig Dispense Refill     aspirin 81 MG tablet Take 81 mg by mouth daily       carvedilol (COREG) 3.125 MG tablet Take 1 tablet (3.125 mg) by mouth 2 times daily (with meals) 180 tablet 1     fluticasone (FLONASE) 50 MCG/ACT spray Spray 1 spray into both nostrils daily       GABAPENTIN PO Take 600 mg by mouth 2 times daily       lidocaine (LIDODERM) 5 % patch Place 2 patches onto the skin every 24 hours       nystatin (MYCOSTATIN) 235717 UNIT/GM external cream Apply topically 2 times daily       polyethylene  glycol (MIRALAX/GLYCOLAX) packet Take 17 g by mouth daily       Rivaroxaban (XARELTO PO) Take 20 mg by mouth daily       senna-docusate (SENOKOT-S/PERICOLACE) 8.6-50 MG tablet Take 1 tablet by mouth 2 times daily 30 tablet 0     TORSEMIDE PO Take 20 mg by mouth daily       Medications reviewed:  Medications reconciled to facility chart and changes were made to reflect current medications as identified as above med list. Below are the changes that were made:   Medications stopped since last EPIC medication reconciliation:   Medications Discontinued During This Encounter   Medication Reason     HYDROmorphone (DILAUDID) 2 MG tablet        Medications started since last [facility] medication reconciliation:  No orders of the defined types were placed in this encounter.      REVIEW OF SYSTEMS:  4 point ROS including Respiratory, CV, GI and , other than that noted in the HPI,  is negative    Physical Exam:  /62   Pulse (!) 48   Temp 98.6  F (37  C)   Resp 18   Ht 1.829 m (6')   Wt 107.9 kg (237 lb 12.8 oz)   SpO2 97%   BMI 32.25 kg/m     GENERAL APPEARANCE:  Alert, in no distress  ENT:  Mouth and posterior oropharynx normal, moist mucous membranes, hearing acuity intact  EYES:  EOM, conjunctivae, lids, pupils and irises normal  NECK:  No adenopathy,masses or thyromegaly  RESP:  respiratory effort normal, no respiratory distress,   CV: RRR. Edema 1+ to knee  ABDOMEN:  normal bowel sounds, tense, nontender;   M/S:   Gait and station not observed, Digits and nails with mild thickening, no clubbing  SKIN: right side of abd with diffuse erythema. No pain or warmth.   NEURO: 2-12 in normal limits and at patient's baseline  PSYCH:  Insight, judgement, and memory intact, affect and mood normal    Recent Labs:  CBC RESULTS:   Recent Labs   Lab Test 12/13/18  1247 12/07/18  0700   WBC 6.1 7.4   RBC 2.48* 2.93*   HGB 7.7* 9.0*   HCT 22.9* 26.6*   MCV 92 91   MCH 31.0 30.7   MCHC 33.6 33.8   RDW 17.1* 16.5*    190        Last Basic Metabolic Panel:  Recent Labs   Lab Test 12/13/18  1247 12/11/18  1311    137   POTASSIUM 4.6 4.3   CHLORIDE 101 101   BENNIE 8.5 8.6   CO2 29 28   BUN 44* 42*   CR 1.63* 1.42*   * 109*       Liver Function Studies -   Recent Labs   Lab Test 12/13/18  1247 12/11/18  1311   PROTTOTAL 6.7* 6.7*   ALBUMIN 2.6* 2.7*   BILITOTAL 2.3* 2.2*   ALKPHOS 106 95   AST 47* 38   ALT 16 13       TSH   Date Value Ref Range Status   10/31/2018 0.86 0.40 - 4.00 mU/L Final   10/31/2018 Canceled, Test credited, specimen discarded 0.40 - 4.00 mU/L Final     Comment:     Unsatisfactory specimen - hemolyzed     Lab Results   Component Value Date    A1C 4.6 05/18/2018       Assessment/Plan:      (Z96.641) Status post total replacement of right hip  (primary encounter diagnosis)  (M16.11) Osteoarthritis of right hip, unspecified osteoarthritis type  (R53.81) Physical deconditioning  (K59.00) Constipation  Comment:  s/p right TKA. Recent reduction in dilaudid.  Patient did return to see Dr Howard-recommend more physical therapy   This week he is making progress with therapy, walking more  Plan: physical therapy , discharge next week?      (Z89.034) History of left below knee amputation (H)  Comment: 4 cm x 2 cm fissure with brown crusting, no expanding erythema, edema or increased pain.   Plan: Plan of care for left stump: 2x/ day   1. Clean with gentle soap and water, dry and dry again.  Make sure is dry before putting on prosthetic..  2. Apply lotion down to the very distal aspect of the stump but not on the very bottom/ distal aspect- want that to become dry and callused  3. Apply very small dab of Vaseline just where the fissure is.  Goal is to keep that just a bit moist so it can heal but not moisten the surround callus  4. Cover distal stump with one, dry 4x4 then a 4x4 that is opened and conformed to the stump  5. Wear a washable sleeve  6. Keep rubbery prosthetic clean    (I25.5) Ischemic  cardiomyopathy  Comment: EF 35-40%. He did return to see Dr Tapia on 12/19-   Plan: Torsemide 20 mg daily - monitor RLE edema and HF. Repeat echo in 4 mos  (D68.61) Antiphospholipid antibody syndrome (H)  Comment: Hx of PE; on chronic anticoagulant.   Plan: Continue Xarelto and baby aspirin.  (I10) Benign essential hypertension  (I48.0) Paroxysmal A-fib  (I77.810) Aortic root dilatation (H)  Comment: no change in status, h/o SDH. He continues on xarelto  Plan: Continue Coreg, xarelto    (F10.10) Alcohol abuse  (K70.31) Alcoholic cirrhosis of liver with ascites (H)  Comment: no signs of alcohol withdrawal; patient's abdomen is taught r/t fluid/ascites. Used to do paracentesis every 2 weeks in [place]; states not as frequently here has he needs new orders every time. Last paracentesis 10/31/18. Bilirubin 3.0 on 12/7/18 and 2.2 on 12/11 (baseline in hospital < 1.0.)  Almost 5l of fluid removed from abdomen on 12/21. He feels better   Plan: follow up with GI.     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   Plan: last seen 10/25/18 - follow-up in 6 months (April 2019).         Electronically signed by  DIPAK Huggins CNP                      Sincerely,        DIPAK Huggins CNP

## 2018-12-26 NOTE — PROGRESS NOTES
[facility]    Chief Complaint   Patient presents with     ABDIELTUCKER       [facility] Medical Record Number:  [id number]  [facility] [[id number]]    HPI:    Antonio Ramirez is a 73 year old  (1945), who is being seen today for an episodic care visit.  HPI information obtained from: patient report, [facility] chart review and family/first contact [place] report.  Current issues are:  .     Status post total replacement of right hip  Osteoarthritis of right hip, unspecified osteoarthritis type  Physical deconditioning  Hx of left below-the-knee amputation  Patient underwent right total hip arthroplasty at [facility] (primary surgeon Dr. Howard) on 11/27/18 d/t hx of osteoarthritis of the right hip. EBL 1200 mL - required 3 units PRBCs inpatient (11/29, 12/1 & 12/5). Dose of Venofer given 12/5 for possible iron-deficiency anemia. Prophy antibiotics x 24-hours post-op. Pain moderately controlled with PO Dilaudid (had been receiving 4 mg doses when experienced Syncope inpatient; now down to 2 mg with reports of poor pain control). Episode of ANSELMO - creatinine up to 2.09, estimated GFR 31 - improved to creatinine of 1.23, estimated GFR 57 12/4/18.     To note, patient previously had a left total arthroplasty and left below-the-knee amputation approximately 5 years ago in [place]. 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 It was noticed during hospitalization that there was a fissure on the left stump and concern for proper wound care (i.e. malodorous, no sock between prosthesis & skin, etc.). Wound care orders from hospital in plan below.      Ischemic cardiomyopathy  Antiphospholipid antibody syndrome (H)  Paroxysmal atrial fibrillation (H)  Benign essential hypertension  Aortic Root Dilation  Echo 7/11/18 - EF 40-45% with  anterior, septal & apical wall akinesis  Patient anticoagulated with chronic Xarelto d/t history of PE presumably related to his antiphospholipid antibody syndrome - held during immediately prior to and during surgery; restarted POD #1.   Coreg for chronic A-Fib.   Syncopal episode w/LOC during hospitalization (11/29/18 & 11/30/18) - etiology multifactorial (pain meds vs. vagal response vs. known aortic stenosis and aortic root dilation vs. Pulmonary hypertension vs. ?); recovered with fluids and RBCs.   Restarted Torsemide 12/2/18; US 12/3 to r/o DVT - recommend compression stockings.     BP: 113/66, 117/62, 94/56    Alcohol abuse  Possible alcoholic cirrhosis of the liver w/ascites  Patient reports h/o alcohol abuse, stopping in 2015 but ETOH level from 10/31/18 was 0.10. Family reports 40+ years of heavy drinking.   Possible alcoholic cirrhosis w/ascites although this was no mentioned in CT imaging; had a paracentesis 10/30/18, 3.3L removed   He did return to radiology on 12/21 for paracentesis- 4250 ml was removed.     Malignant Lymphoplasmacytic Lymphoma  Diagnosed via BMBX - no evidence of progression in BMBX September 2018.     ALLERGIES: Ciprofloxacin and Hmg-coa-r inhibitors  Past Medical, Surgical, Family and Social History reviewed and updated in [facility].    Current Outpatient Medications   Medication Sig Dispense Refill     aspirin 81 MG tablet Take 81 mg by mouth daily       carvedilol (COREG) 3.125 MG tablet Take 1 tablet (3.125 mg) by mouth 2 times daily (with meals) 180 tablet 1     fluticasone (FLONASE) 50 MCG/ACT spray Spray 1 spray into both nostrils daily       GABAPENTIN PO Take 600 mg by mouth 2 times daily       lidocaine (LIDODERM) 5 % patch Place 2 patches onto the skin every 24 hours       nystatin (MYCOSTATIN) 281036 UNIT/GM external cream Apply topically 2 times daily       polyethylene glycol (MIRALAX/GLYCOLAX) packet Take 17 g by mouth daily       Rivaroxaban (XARELTO PO) Take 20 mg by  mouth daily       senna-docusate (SENOKOT-S/PERICOLACE) 8.6-50 MG tablet Take 1 tablet by mouth 2 times daily 30 tablet 0     TORSEMIDE PO Take 20 mg by mouth daily       Medications reviewed:  Medications reconciled to facility chart and changes were made to reflect current medications as identified as above med list. Below are the changes that were made:   Medications stopped since last EPIC medication reconciliation:   Medications Discontinued During This Encounter   Medication Reason     HYDROmorphone (DILAUDID) 2 MG tablet        Medications started since last [facility] medication reconciliation:  No orders of the defined types were placed in this encounter.      REVIEW OF SYSTEMS:  4 point ROS including Respiratory, CV, GI and , other than that noted in the HPI,  is negative    Physical Exam:  /62   Pulse (!) 48   Temp 98.6  F (37  C)   Resp 18   Ht 1.829 m (6')   Wt 107.9 kg (237 lb 12.8 oz)   SpO2 97%   BMI 32.25 kg/m    GENERAL APPEARANCE:  Alert, in no distress  ENT:  Mouth and posterior oropharynx normal, moist mucous membranes, hearing acuity intact  EYES:  EOM, conjunctivae, lids, pupils and irises normal  NECK:  No adenopathy,masses or thyromegaly  RESP:  respiratory effort normal, no respiratory distress,   CV: RRR. Edema 1+ to knee  ABDOMEN:  normal bowel sounds, tense, nontender;   M/S:   Gait and station not observed, Digits and nails with mild thickening, no clubbing  SKIN: right side of abd with diffuse erythema. No pain or warmth.   NEURO: 2-12 in normal limits and at patient's baseline  PSYCH:  Insight, judgement, and memory intact, affect and mood normal    Recent Labs:  CBC RESULTS:   Recent Labs   Lab Test 12/13/18  1247 12/07/18  0700   WBC 6.1 7.4   RBC 2.48* 2.93*   HGB 7.7* 9.0*   HCT 22.9* 26.6*   MCV 92 91   MCH 31.0 30.7   MCHC 33.6 33.8   RDW 17.1* 16.5*    190       Last Basic Metabolic Panel:  Recent Labs   Lab Test 12/13/18  1247 12/11/18  1311    137    POTASSIUM 4.6 4.3   CHLORIDE 101 101   BENNIE 8.5 8.6   CO2 29 28   BUN 44* 42*   CR 1.63* 1.42*   * 109*       Liver Function Studies -   Recent Labs   Lab Test 12/13/18  1247 12/11/18  1311   PROTTOTAL 6.7* 6.7*   ALBUMIN 2.6* 2.7*   BILITOTAL 2.3* 2.2*   ALKPHOS 106 95   AST 47* 38   ALT 16 13       TSH   Date Value Ref Range Status   10/31/2018 0.86 0.40 - 4.00 mU/L Final   10/31/2018 Canceled, Test credited, specimen discarded 0.40 - 4.00 mU/L Final     Comment:     Unsatisfactory specimen - hemolyzed     Lab Results   Component Value Date    A1C 4.6 05/18/2018       Assessment/Plan:      (Z96.641) Status post total replacement of right hip  (primary encounter diagnosis)  (M16.11) Osteoarthritis of right hip, unspecified osteoarthritis type  (R53.81) Physical deconditioning  (K59.00) Constipation  Comment:  s/p right TKA. Recent reduction in dilaudid.  Patient did return to see Dr Howard-recommend more physical therapy   This week he is making progress with therapy, walking more  Plan: physical therapy , discharge next week?      (Z89.506) History of left below knee amputation (H)  Comment: 4 cm x 2 cm fissure with brown crusting, no expanding erythema, edema or increased pain.   Plan: Plan of care for left stump: 2x/ day   1. Clean with gentle soap and water, dry and dry again.  Make sure is dry before putting on prosthetic..  2. Apply lotion down to the very distal aspect of the stump but not on the very bottom/ distal aspect- want that to become dry and callused  3. Apply very small dab of Vaseline just where the fissure is.  Goal is to keep that just a bit moist so it can heal but not moisten the surround callus  4. Cover distal stump with one, dry 4x4 then a 4x4 that is opened and conformed to the stump  5. Wear a washable sleeve  6. Keep rubbery prosthetic clean    (I25.5) Ischemic cardiomyopathy  Comment: EF 35-40%. He did return to see Dr Tapia on 12/19-   Plan: Torsemide 20 mg daily - monitor  RLE edema and HF. Repeat echo in 4 mos  (D68.61) Antiphospholipid antibody syndrome (H)  Comment: Hx of PE; on chronic anticoagulant.   Plan: Continue Xarelto and baby aspirin.  (I10) Benign essential hypertension  (I48.0) Paroxysmal A-fib  (I77.810) Aortic root dilatation (H)  Comment: no change in status, h/o SDH. He continues on xarelto  Plan: Continue Coreg, xarelto    (F10.10) Alcohol abuse  (K70.31) Alcoholic cirrhosis of liver with ascites (H)  Comment: no signs of alcohol withdrawal; patient's abdomen is taught r/t fluid/ascites. Used to do paracentesis every 2 weeks in [place]; states not as frequently here has he needs new orders every time. Last paracentesis 10/31/18. Bilirubin 3.0 on 12/7/18 and 2.2 on 12/11 (baseline in hospital < 1.0.)  Almost 5l of fluid removed from abdomen on 12/21. He feels better   Plan: follow up with GI.     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   Plan: last seen 10/25/18 - follow-up in 6 months (April 2019).         Electronically signed by  DIPAK Huggins CNP

## 2018-12-27 ENCOUNTER — HOSPITAL LABORATORY (OUTPATIENT)
Dept: OTHER | Facility: CLINIC | Age: 73
End: 2018-12-27

## 2018-12-27 LAB
ALBUMIN SERPL-MCNC: 2.4 G/DL (ref 3.4–5)
ALP SERPL-CCNC: 94 U/L (ref 40–150)
ALT SERPL W P-5'-P-CCNC: 11 U/L (ref 0–70)
ANION GAP SERPL CALCULATED.3IONS-SCNC: 9 MMOL/L (ref 3–14)
AST SERPL W P-5'-P-CCNC: 31 U/L (ref 0–45)
BILIRUB SERPL-MCNC: 1.3 MG/DL (ref 0.2–1.3)
BUN SERPL-MCNC: 16 MG/DL (ref 7–30)
CALCIUM SERPL-MCNC: 8.7 MG/DL (ref 8.5–10.1)
CHLORIDE SERPL-SCNC: 107 MMOL/L (ref 94–109)
CO2 SERPL-SCNC: 25 MMOL/L (ref 20–32)
CREAT SERPL-MCNC: 1.15 MG/DL (ref 0.66–1.25)
GFR SERPL CREATININE-BSD FRML MDRD: 62 ML/MIN/{1.73_M2}
GLUCOSE SERPL-MCNC: 77 MG/DL (ref 70–99)
POTASSIUM SERPL-SCNC: 4.2 MMOL/L (ref 3.4–5.3)
PROT SERPL-MCNC: 6.3 G/DL (ref 6.8–8.8)
SODIUM SERPL-SCNC: 141 MMOL/L (ref 133–144)

## 2018-12-31 ENCOUNTER — DISCHARGE SUMMARY NURSING HOME (OUTPATIENT)
Dept: GERIATRICS | Facility: CLINIC | Age: 73
End: 2018-12-31
Payer: MEDICARE

## 2018-12-31 VITALS
OXYGEN SATURATION: 97 % | BODY MASS INDEX: 30.52 KG/M2 | DIASTOLIC BLOOD PRESSURE: 66 MMHG | TEMPERATURE: 99.7 F | SYSTOLIC BLOOD PRESSURE: 119 MMHG | WEIGHT: 237.8 LBS | HEIGHT: 74 IN | RESPIRATION RATE: 18 BRPM | HEART RATE: 55 BPM

## 2018-12-31 DIAGNOSIS — D68.61 ANTIPHOSPHOLIPID ANTIBODY SYNDROME (H): ICD-10-CM

## 2018-12-31 DIAGNOSIS — I25.5 ISCHEMIC CARDIOMYOPATHY: ICD-10-CM

## 2018-12-31 DIAGNOSIS — K70.31 ALCOHOLIC CIRRHOSIS OF LIVER WITH ASCITES (H): ICD-10-CM

## 2018-12-31 DIAGNOSIS — C83.398 DIFFUSE LARGE B-CELL LYMPHOMA OF EXTRANODAL SITE: ICD-10-CM

## 2018-12-31 DIAGNOSIS — R53.81 PHYSICAL DECONDITIONING: ICD-10-CM

## 2018-12-31 DIAGNOSIS — I77.810 AORTIC ROOT DILATATION (H): ICD-10-CM

## 2018-12-31 DIAGNOSIS — Z89.512 HISTORY OF LEFT BELOW KNEE AMPUTATION (H): ICD-10-CM

## 2018-12-31 DIAGNOSIS — I10 BENIGN ESSENTIAL HYPERTENSION: ICD-10-CM

## 2018-12-31 DIAGNOSIS — I48.0 PAROXYSMAL ATRIAL FIBRILLATION (H): ICD-10-CM

## 2018-12-31 DIAGNOSIS — Z96.641 STATUS POST TOTAL REPLACEMENT OF RIGHT HIP: Primary | ICD-10-CM

## 2018-12-31 PROCEDURE — 99316 NF DSCHRG MGMT 30 MIN+: CPT | Performed by: NURSE PRACTITIONER

## 2018-12-31 RX ORDER — CARVEDILOL 3.12 MG/1
3.12 TABLET ORAL 2 TIMES DAILY WITH MEALS
Qty: 60 TABLET | Refills: 0 | Status: SHIPPED | OUTPATIENT
Start: 2018-12-31 | End: 2018-12-31

## 2018-12-31 RX ORDER — AMOXICILLIN 250 MG
1 CAPSULE ORAL 2 TIMES DAILY
COMMUNITY
End: 2018-12-31

## 2018-12-31 RX ORDER — POLYETHYLENE GLYCOL 3350 17 G/17G
17 POWDER, FOR SOLUTION ORAL DAILY
COMMUNITY
End: 2018-12-31

## 2018-12-31 RX ORDER — GABAPENTIN 600 MG/1
600 TABLET ORAL 2 TIMES DAILY
Qty: 60 TABLET | Refills: 11 | Status: ON HOLD | OUTPATIENT
Start: 2018-12-31 | End: 2019-03-05

## 2018-12-31 ASSESSMENT — MIFFLIN-ST. JEOR: SCORE: 1883.4

## 2018-12-31 NOTE — LETTER
"    12/31/2018        RE: Antonio Ramirez  [address]  [place] [address]          [facility] DISCHARGE SUMMARY    PATIENT'S NAME: Antonio Ramirez  YOB: 1945  MEDICAL RECORD NUMBER:  [id number]  [facility] of [facility] where encounter took place[facility] [[id number]]    PRIMARY CARE PROVIDER AND CLINIC RESPONSIBLE AFTER TRANSFER: [facility] [address] / [place] [address]     TRANSFERRING PROVIDERS: DIPAK Huggins CNP, A. Ahmed, MD  DATE OF SNF ADMISSION:  December / 05 / 2018  DATE OF SNF (anticipated) DISCHARGE: December / 31 / 2018  DISCHARGE DISPOSITION: [facility] Provider   RECENT HOSPITALIZATION/ED:  [facility] stay 11/27/18 to 12/5/18.     CODE STATUS/ADVANCE DIRECTIVES DISCUSSION:   CPR/Full code      Allergies   Allergen Reactions     Ciprofloxacin Itching     Severe itching \"like ants were crawling\"     Hmg-Coa-R Inhibitors Other (See Comments)     Rhabdomyolysis occurred within a couple days     Condition on Discharge:  Improving.  Function:  Needs some assist with ADLs, walking up to 300' with walker and SBA  Cognitive Scores: SLUMS 16/30    Equipment: walker    DISCHARGE DIAGNOSIS:   1. Status post total replacement of right hip    2. Physical deconditioning    3. History of left below knee amputation (H)    4. Ischemic cardiomyopathy    5. Antiphospholipid antibody syndrome (H)    6. Paroxysmal atrial fibrillation (H)    7. Benign essential hypertension    8. Aortic root dilatation (H)    9. Alcoholic cirrhosis of liver with ascites (H)    10. 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      Status post total replacement of right hip  Osteoarthritis of right hip, unspecified osteoarthritis type  Physical deconditioning  Hx of left " below-the-knee amputation  Patient underwent right total hip arthroplasty at [facility] (primary surgeon Dr. Howard) on 11/27/18 d/t hx of osteoarthritis of the right hip. EBL 1200 mL - required 3 units PRBCs inpatient (11/29, 12/1 & 12/5). Dose of Venofer given 12/5 for possible iron-deficiency anemia.  Pain moderately controlled with PO Dilaudid- has been weaned and discontinued.  Episode of ANSELMO - creatinine up to 2.09, estimated GFR 31 - improved to creatinine of 1.23, estimated GFR 57 12/4/18.      To note, patient previously had a left total arthroplasty and left below-the-knee amputation approximately 5 years ago in [place]. 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 It was noticed during hospitalization that there was a fissure on the left stump and concern for proper wound care (i.e. malodorous, no sock between prosthesis & skin, etc.).     Ischemic cardiomyopathy  Antiphospholipid antibody syndrome (H)  Paroxysmal atrial fibrillation (H)  Benign essential hypertension  Aortic Root Dilation  Echo 7/11/18 - EF 40-45% with anterior, septal & apical wall akinesis  Patient anticoagulated with chronic Xarelto d/t history of PE presumably related to his antiphospholipid antibody syndrome - held during immediately prior to and during surgery; restarted POD #1.   Coreg for chronic A-Fib.   Syncopal episode w/LOC during hospitalization (11/29/18 & 11/30/18) - etiology multifactorial (pain meds vs. vagal response vs. known aortic stenosis and aortic root dilation vs. Pulmonary hypertension vs. ?); recovered with fluids and RBCs.   Restarted Torsemide 12/2/18; US 12/3 to r/o DVT - recommend compression stockings.      BP: 113/66, 117/62, 94/56     Alcohol abuse  Possible alcoholic cirrhosis of the liver w/ascites  Patient reports h/o alcohol abuse, stopping in 2015 but ETOH level from 10/31/18 was 0.10.  Family reports 40+ years of heavy drinking.   Possible alcoholic cirrhosis w/ascites although this was no mentioned in CT imaging; had a paracentesis 10/30/18, 3.3L removed   He did return to radiology on 12/21 for paracentesis- 4250 ml was removed.      Malignant Lymphoplasmacytic Lymphoma  Diagnosed via BMBX - no evidence of progression in BMBX September 2018.   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    DISCHARGE MEDICATIONS:  Current Outpatient Medications   Medication Sig Dispense Refill     aspirin 81 MG tablet Take 81 mg by mouth daily       carvedilol (COREG) 3.125 MG tablet Take 1 tablet (3.125 mg) by mouth 2 times daily (with meals) 180 tablet 1     fluticasone (FLONASE) 50 MCG/ACT spray Spray 1 spray into both nostrils daily       GABAPENTIN PO Take 600 mg by mouth 2 times daily       nystatin (MYCOSTATIN) 085001 UNIT/GM external cream Apply topically 2 times daily       phenylephrine-cocoa butter (PREPARATION H) 0.25-88.44 % suppository Place 1 suppository rectally daily as needed for hemorrhoids or itching       Rivaroxaban (XARELTO PO) Take 20 mg by mouth daily        "TORSEMIDE PO Take 20 mg by mouth daily         MEDICATION CHANGES/RATIONALE:   12/6/18 dilaudid 2mg q 4 h; hydroxyzine 10mg q 6 h; change gabapentin 600mg BID  12/10/18 penicllin 250mg q 6 h for 5 days for UTI  12/11/18 decrease dilaudid 2mg q 6 h; change hydroxyzine 10mg q 6 h prn  12/14/18 decrease dilaudid 2mg q 8 h  12/18/18 decrease dilaudid 2mg q 12h  12/20/18 DISCONTINUE hydroxyzine; decrease dilaudid 2mg daily  12/25/18 DISCONTINUE dilaudid  12/31/18 DISCONTINUE lidocaine patch; miralax and senna s  Controlled medications sent with patient:   not applicable/none     ROS:    4 point ROS including Respiratory, CV, GI and , other than that noted in the HPI,  is negative    Physical Exam:   Vitals: /66   Pulse 55   Temp 99.7  F (37.6  C)   Resp 18   Ht 1.88 m (6' 2\")   Wt 107.9 kg (237 lb 12.8 oz)   SpO2 97%   BMI 30.53 kg/m     BMI= Body mass index is 30.53 kg/m .  GENERAL APPEARANCE:  Alert, in no distress  ENT:  Mouth and posterior oropharynx normal, moist mucous membranes, hearing acuity adequate   EYES:  EOM, conjunctivae, lids, pupils and irises normal    RESP:  respiratory effort and palpation of chest normal, no respiratory distress,    ABDOMEN:  normal bowel sounds, soft, nontender,  M/S:   Gait and station left BKA. Antalgic gait, Digits and nails normal   SKIN:  Inspection/Palpation of skin and subcutaneous tissue no rash  NEURO: 2-12 in normal limits and at patient's baseline  PSYCH:  insight and judgement, memory impaired , affect and mood normal    DISCHARGE PLAN:  Occupational Therapy, Physical Therapy, Registered Nurse and Home Health Aide  Patient instructed to follow-up with:  PCP in 7 days    APPOINTMENT [facility]- Dr. Howard Date: Monday, January 14th,2019 Time: 1:00 Location:[address] [place] [address] Phone: [phone]  APPOINTMENT: SIVAKUMAR ROY Date: FRIDAY, JANUARY 18TH 2019 Time: 9:40 AM Location: [address] [address] " [place] Phone: Phone: [phone]    [facility] scheduled appointments:  No future appointments.    MTM referral needed and placed by this provider: No    Pending labs: none  SNF labs   CBC RESULTS:   Recent Labs   Lab Test 12/13/18  1247 12/07/18  0700   WBC 6.1 7.4   RBC 2.48* 2.93*   HGB 7.7* 9.0*   HCT 22.9* 26.6*   MCV 92 91   MCH 31.0 30.7   MCHC 33.6 33.8   RDW 17.1* 16.5*    190       Last Basic Metabolic Panel:  Recent Labs   Lab Test 12/27/18  1210 12/13/18  1247    136   POTASSIUM 4.2 4.6   CHLORIDE 107 101   BENNIE 8.7 8.5   CO2 25 29   BUN 16 44*   CR 1.15 1.63*   GLC 77 119*       Liver Function Studies -   Recent Labs   Lab Test 12/27/18  1210 12/13/18  1247   PROTTOTAL 6.3* 6.7*   ALBUMIN 2.4* 2.6*   BILITOTAL 1.3 2.3*   ALKPHOS 94 106   AST 31 47*   ALT 11 16       TSH   Date Value Ref Range Status   10/31/2018 0.86 0.40 - 4.00 mU/L Final   10/31/2018 Canceled, Test credited, specimen discarded 0.40 - 4.00 mU/L Final     Comment:     Unsatisfactory specimen - hemolyzed   ]    Lab Results   Component Value Date    A1C 4.6 05/18/2018         Discharge Treatments:Dressing Left Stump 1.Cleanse w/gentle soap & water and pat dry. 2.Apply lotion to distal stump but not bottom(where fissure is) 3.Small dab Vaseline @ fissure. 4.Cover distal stump w/one dry 4x4 and one opened 4x4 conformed to stump. 5.Wear washable sleeve w/prosthetic.       TOTAL DISCHARGE TIME:   Greater than 30 minutes  Electronically signed by:  DIPAK Huggins CNP      Sincerely,        DIPAK Huggins CNP

## 2018-12-31 NOTE — PROGRESS NOTES
"[facility] GERIATRIC SERVICES DISCHARGE SUMMARY    PATIENT'S NAME: Antonio Ramirez  YOB: 1945  MEDICAL RECORD NUMBER:  [id number]  Place of Service where encounter took place:  [facility] [[id number]]    PRIMARY CARE PROVIDER AND CLINIC RESPONSIBLE AFTER TRANSFER: Main Hardy [address] / [place] [address]     TRANSFERRING PROVIDERS: DIPAK Huggins CNP, A. Ahmed, MD  DATE OF SNF ADMISSION:  December / 05 / 2018  DATE OF SNF (anticipated) DISCHARGE: December / 31 / 2018  DISCHARGE DISPOSITION: FMG Provider   RECENT HOSPITALIZATION/ED:  [facility] stay 11/27/18 to 12/5/18.     CODE STATUS/ADVANCE DIRECTIVES DISCUSSION:   CPR/Full code      Allergies   Allergen Reactions     Ciprofloxacin Itching     Severe itching \"like ants were crawling\"     Hmg-Coa-R Inhibitors Other (See Comments)     Rhabdomyolysis occurred within a couple days     Condition on Discharge:  Improving.  Function:  Needs some assist with ADLs, walking up to 300' with walker and SBA  Cognitive Scores: SLUMS 16/30    Equipment: walker    DISCHARGE DIAGNOSIS:   1. Status post total replacement of right hip    2. Physical deconditioning    3. History of left below knee amputation (H)    4. Ischemic cardiomyopathy    5. Antiphospholipid antibody syndrome (H)    6. Paroxysmal atrial fibrillation (H)    7. Benign essential hypertension    8. Aortic root dilatation (H)    9. Alcoholic cirrhosis of liver with ascites (H)    10. 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 (H)        HPI Nursing Facility Course:  HPI information obtained from: facility chart records, facility staff, patient report and [facility] chart review.      Status post total replacement of right hip  Osteoarthritis of right hip, unspecified osteoarthritis type  Physical deconditioning  Hx of left below-the-knee amputation  Patient underwent right total hip arthroplasty at [facility] (primary surgeon Dr. Howard) on 11/27/18 d/t hx of osteoarthritis of the right hip. EBL 1200 mL - required 3 units PRBCs inpatient (11/29, 12/1 & 12/5). Dose of Venofer given 12/5 for possible iron-deficiency anemia.  Pain moderately controlled with PO Dilaudid- has been weaned and discontinued.  Episode of ANSELMO - creatinine up to 2.09, estimated GFR 31 - improved to creatinine of 1.23, estimated GFR 57 12/4/18.      To note, patient previously had a left total arthroplasty and left below-the-knee amputation approximately 5 years ago in [place]. 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 It was noticed during hospitalization that there was a fissure on the left stump and concern for proper wound care (i.e. malodorous, no sock between prosthesis & skin, etc.).     Ischemic cardiomyopathy  Antiphospholipid antibody syndrome (H)  Paroxysmal atrial fibrillation (H)  Benign essential hypertension  Aortic Root Dilation  Echo 7/11/18 - EF 40-45% with anterior, septal & apical wall akinesis  Patient anticoagulated with chronic Xarelto d/t history of PE presumably related to his antiphospholipid antibody syndrome - held during immediately prior to and during surgery; restarted POD #1.   Coreg for chronic A-Fib.   Syncopal episode w/LOC during hospitalization (11/29/18 & 11/30/18) - etiology multifactorial (pain meds vs. vagal response vs. known aortic stenosis and aortic root dilation vs. Pulmonary hypertension vs. ?); recovered with fluids and RBCs.   Restarted Torsemide 12/2/18; US 12/3 to r/o DVT - recommend compression stockings.      BP: 113/66, 117/62, 94/56     Alcohol abuse  Possible alcoholic cirrhosis of the liver w/ascites  Patient reports h/o alcohol abuse, stopping in 2015 but ETOH level from 10/31/18 was 0.10. Family reports 40+ years of heavy drinking.   Possible alcoholic cirrhosis w/ascites although  this was no mentioned in CT imaging; had a paracentesis 10/30/18, 3.3L removed   He did return to radiology on 12/21 for paracentesis- 4250 ml was removed.      Malignant Lymphoplasmacytic Lymphoma  Diagnosed via BMBX - no evidence of progression in BMBX September 2018.   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    DISCHARGE MEDICATIONS:  Current Outpatient Medications   Medication Sig Dispense Refill     aspirin 81 MG tablet Take 81 mg by mouth daily       carvedilol (COREG) 3.125 MG tablet Take 1 tablet (3.125 mg) by mouth 2 times daily (with meals) 180 tablet 1     fluticasone (FLONASE) 50 MCG/ACT spray Spray 1 spray into both nostrils daily       GABAPENTIN PO Take 600 mg by mouth 2 times daily       nystatin (MYCOSTATIN) 446185 UNIT/GM external cream Apply topically 2 times daily       phenylephrine-cocoa butter (PREPARATION H) 0.25-88.44 % suppository Place 1 suppository rectally daily as needed for hemorrhoids or itching       Rivaroxaban (XARELTO PO) Take 20 mg by mouth daily       TORSEMIDE PO Take 20 mg by mouth daily         MEDICATION CHANGES/RATIONALE:   12/6/18 dilaudid  "2mg q 4 h; hydroxyzine 10mg q 6 h; change gabapentin 600mg BID  12/10/18 penicllin 250mg q 6 h for 5 days for UTI  12/11/18 decrease dilaudid 2mg q 6 h; change hydroxyzine 10mg q 6 h prn  12/14/18 decrease dilaudid 2mg q 8 h  12/18/18 decrease dilaudid 2mg q 12h  12/20/18 DISCONTINUE hydroxyzine; decrease dilaudid 2mg daily  12/25/18 DISCONTINUE dilaudid  12/31/18 DISCONTINUE lidocaine patch; miralax and senna s  Controlled medications sent with patient:   not applicable/none     ROS:    4 point ROS including Respiratory, CV, GI and , other than that noted in the HPI,  is negative    Physical Exam:   Vitals: /66   Pulse 55   Temp 99.7  F (37.6  C)   Resp 18   Ht 1.88 m (6' 2\")   Wt 107.9 kg (237 lb 12.8 oz)   SpO2 97%   BMI 30.53 kg/m    BMI= Body mass index is 30.53 kg/m .  GENERAL APPEARANCE:  Alert, in no distress  ENT:  Mouth and posterior oropharynx normal, moist mucous membranes, hearing acuity adequate   EYES:  EOM, conjunctivae, lids, pupils and irises normal    RESP:  respiratory effort and palpation of chest normal, no respiratory distress,    ABDOMEN:  normal bowel sounds, soft, nontender,  M/S:   Gait and station left BKA. Antalgic gait, Digits and nails normal   SKIN:  Inspection/Palpation of skin and subcutaneous tissue no rash  NEURO: 2-12 in normal limits and at patient's baseline  PSYCH:  insight and judgement, memory impaired , affect and mood normal    DISCHARGE PLAN:  Occupational Therapy, Physical Therapy, Registered Nurse and Home Health Aide  Patient instructed to follow-up with:  PCP in 7 days    APPOINTMENT [facility]- Dr. Howard Date: Monday, January 14th,2019 Time: 1:00 Location:[address] [place] [address] Phone: [phone]  APPOINTMENT: SIVAKUMAR ROY Date: FRIDAY, JANUARY 18TH 2019 Time: 9:40 AM Location: [address] Phone: Phone: [phone]    [facility] scheduled appointments:  No future " appointments.    MTM referral needed and placed by this provider: No    Pending labs: none  SNF labs   CBC RESULTS:   Recent Labs   Lab Test 12/13/18  1247 12/07/18  0700   WBC 6.1 7.4   RBC 2.48* 2.93*   HGB 7.7* 9.0*   HCT 22.9* 26.6*   MCV 92 91   MCH 31.0 30.7   MCHC 33.6 33.8   RDW 17.1* 16.5*    190       Last Basic Metabolic Panel:  Recent Labs   Lab Test 12/27/18  1210 12/13/18  1247    136   POTASSIUM 4.2 4.6   CHLORIDE 107 101   BENNIE 8.7 8.5   CO2 25 29   BUN 16 44*   CR 1.15 1.63*   GLC 77 119*       Liver Function Studies -   Recent Labs   Lab Test 12/27/18  1210 12/13/18  1247   PROTTOTAL 6.3* 6.7*   ALBUMIN 2.4* 2.6*   BILITOTAL 1.3 2.3*   ALKPHOS 94 106   AST 31 47*   ALT 11 16       TSH   Date Value Ref Range Status   10/31/2018 0.86 0.40 - 4.00 mU/L Final   10/31/2018 Canceled, Test credited, specimen discarded 0.40 - 4.00 mU/L Final     Comment:     Unsatisfactory specimen - hemolyzed   ]    Lab Results   Component Value Date    A1C 4.6 05/18/2018         Discharge Treatments:Dressing Left Stump 1.Cleanse w/gentle soap & water and pat dry. 2.Apply lotion to distal stump but not bottom(where fissure is) 3.Small dab Vaseline @ fissure. 4.Cover distal stump w/one dry 4x4 and one opened 4x4 conformed to stump. 5.Wear washable sleeve w/prosthetic.       TOTAL DISCHARGE TIME:   Greater than 30 minutes  Electronically signed by:  DIPAK Huggins CNP

## 2019-01-02 ENCOUNTER — CARE COORDINATION (OUTPATIENT)
Dept: FAMILY MEDICINE | Facility: CLINIC | Age: 74
End: 2019-01-02

## 2019-01-02 RX ORDER — CARVEDILOL 3.12 MG/1
TABLET ORAL
Qty: 180 TABLET | Refills: 0 | Status: ON HOLD | OUTPATIENT
Start: 2019-01-02 | End: 2019-03-05

## 2019-01-03 ENCOUNTER — TELEPHONE (OUTPATIENT)
Dept: FAMILY MEDICINE | Facility: CLINIC | Age: 74
End: 2019-01-03

## 2019-01-03 ENCOUNTER — PATIENT OUTREACH (OUTPATIENT)
Dept: CARE COORDINATION | Facility: CLINIC | Age: 74
End: 2019-01-03

## 2019-01-03 ASSESSMENT — ACTIVITIES OF DAILY LIVING (ADL): DEPENDENT_IADLS:: CLEANING;COOKING;LAUNDRY;SHOPPING;MEAL PREPARATION;MEDICATION MANAGEMENT;TRANSPORTATION

## 2019-01-03 NOTE — LETTER
Health Care Home - Access Care Plan    About Me  Patient Name:  Antonio Ramirez    YOB: 1945  Age:                             73 year old   Keo MRN:            [id number] Telephone Information:   Home Phone [phone]   Mobile [phone]       Address:    [address]  [place] [address] Email address:  [email or web address]      Emergency Contact(s)  Name Relationship Lgl Grd Work Phone Home Phone Mobile Phone   1. MATT RAMIREZ Spouse No   [phone]   2. DIPIKA RAMIREZ  Daughter No   [phone]             My Access Plan  Medical Emergency 911   Questions or concerns during clinic hours Primary Clinic Line, I will call the clinic directly: [facility] - [phone]   24 Hour Appointment Line [phone] or  [phone]) (toll free)   24 Hour Nurse Line [phone] (toll free)   Questions or concerns outside clinic hours 24 Hour Appointment Line, I will call the after-hours on-call line:   [facility] [phone] or [phone]) (toll-free)   Preferred Urgent Care [facility], [phone]   Preferred Hospital [facility]  [phone]   Preferred Pharmacy [facility] [address] [place] [address] [address] AT [address] [place] [address]     Behavioral Health Crisis Line The National Suicide Prevention Lifeline at 1-222.126.3777 or 911       My Care Team Members  Patient Care Team       Relationship Specialty Notifications Start End    Main Hardy MD PCP - General Internal Medicine  5/24/17     Phone: [phone] Pager: [phone] Fax: [phone] [address] [place] [address]    Main Hardy MD PCP - Assigned PCP   2/25/18     Phone: [phone] Pager: [phone] Fax: [phone] [address] [place] [address]    Latasha Flower Home Care Nurse   2/1/18     [facility] RN Case Manager    Diane Chaidez, RN Clinic Care Coordinator  Primary Care - CC  12/6/18     Care, [facility] ([facility]), ([place])  12/31/18     Phone: [phone]                My Medical and Care Information  Problem List   Patient Active Problem List   Diagnosis     Alcohol abuse     Alcoholic cirrhosis of liver (H)     Chronic kidney disease, stage III (moderate) (H)     Physical deconditioning     Prostate cancer -- S/P Radiative Seed implants in 2000 (no problems since)     Antiphospholipid antibody syndrome (H)     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 (H)     Chronic congestive heart failure, unspecified congestive heart failure type     Thrombocytopenia -- suspect related to alcohol use     Subdural hematoma (H)     Benign essential hypertension     Malabsorption of iron     Calculi, ureter     Anemia     Coronary artery disease involving native coronary artery of native heart without angina pectoris     Aortic stenosis     Nonrheumatic mitral valve regurgitation     Nonrheumatic tricuspid valve regurgitation     Pulmonary hypertension (H)     Paroxysmal atrial fibrillation (H)     Ischemic cardiomyopathy     Aortic root dilatation (H)     Venous thrombosis     S/P total hip arthroplasty     History of left below knee amputation (H)     Acute cystitis without hematuria

## 2019-01-03 NOTE — TELEPHONE ENCOUNTER
Verbal approval given per request below. Homecare/Hospice agency to fax orders for provider signature.  Nevaeh Saldaña RN

## 2019-01-03 NOTE — TELEPHONE ENCOUNTER
Reason for Call:  Home Health Care    Jose with [facility] called regarding (reason for call): orders    Orders are needed for this patient.     PT: eval and treat    OT: eval and treat    Skilled Nursin x week for 1 week, 1 x week for 3 weeks and 2 PRN      Phone Number Homecare Nurse can be reached at: [phone]    Can we leave a detailed message on this number? YES      Call taken on 1/3/2019 at 12:49 PM by Viviane Jones  .

## 2019-01-03 NOTE — PROGRESS NOTES
Clinic Care Coordination Contact  Care Coordination Communication    Referral Source: SNF/TCU    Clinical Data:   Patient was hospitalized at [facility] from 11/27/18 to 12/05/18 with diagnosis of s/p total replacement of right hip.   Patient was transferred to [place] [facility] T[facility] - and stayed from 12/05/18 to 12/31/18.    Home Care Contact:              Home Care Agency: [facility]              Contact (): Latasha Flower RN              Care Coordination contacted home care: Yes              Anticipated start of care date: 01/03/19    Patient Contact:               Introduced self: Yes. Care Coordination introduction sent by mail.              Follow up appointments are scheduled as follows:  01/09/19 -      01/14/19 - Dr. Howard - orthopedics     01/18/19 - Dr. Stratton - [facility].              Provided 24 Hour Nurse Line and/or 24 Hour Appointment Scheduling: Yes, by mail.              Home care has contacted patient: Yes              Patient questions/concerns: no.    Plan: RN Care Coordinator will mail Introduction Letter and Access Care Plan to patient. RN CC/SW will await notification from home care staff informing RN/SW Care Coordinator of patients discharge plans/needs. RN/SW Care Coordinator will review chart and outreach to home care every 4 weeks and as needed.        Diane Chaidez RN   Care Coordinator  [facility]  Phone:  [phone]  Email: [email or web address]

## 2019-01-03 NOTE — LETTER
[place] CARE COORDINATION  [address]  [place] [facility] [place] [address]    January 3, 2019    Antonio Ramirez  [address]   [place] [facility] [place] [address]      Dear Antonio,    I am a clinic care coordinator who works with Main Hardy MD at [facility]. I wanted to thank you for spending the time to talk with me. I also wanted to provide you with my contact information so that you can call me with questions or concerns about your health care. Below is a description of clinic care coordination and how I can further assist you.     The clinic care coordinator is a registered nurse and/or  who understand the health care system. The goal of clinic care coordination is to help you manage your health and improve access to the [facility] in the most efficient manner. The registered nurse can assist you in meeting your health care goals by providing education, coordinating services, and strengthening the communication among your providers. The  can assist you with financial, behavioral, psychosocial, chemical dependency, counseling, and/or psychiatric resources.    Please feel free to contact me at [phone], with any questions or concerns. We at [facility] are focused on providing you with the highest-quality healthcare experience possible and that all starts with you.     Sincerely,     Diane Chaidez RN   Care Coordinator  [facility]  Email: [email or web address]    Enclosed: I have enclosed a copy of a 24 Hour Access Plan. This has helpful phone numbers for you to call when needed. Please keep this in an easy to access place to use as needed.

## 2019-01-16 ENCOUNTER — PATIENT OUTREACH (OUTPATIENT)
Dept: CARE COORDINATION | Facility: CLINIC | Age: 74
End: 2019-01-16

## 2019-01-16 ASSESSMENT — ACTIVITIES OF DAILY LIVING (ADL): DEPENDENT_IADLS:: CLEANING;COOKING;LAUNDRY;SHOPPING;MEAL PREPARATION;MEDICATION MANAGEMENT;TRANSPORTATION

## 2019-01-16 NOTE — PROGRESS NOTES
Clinic Care Coordination Contact  Care Team Conversations    [facility]/Voicemail    Referral Source: Home Care     Clinical Data: Care Coordinator Outreach  Writer received an e-mail from HC , Latasha Flower, informing patient had requested to discontinued nursing care and OT before reaching the established goals. He still wanted to keep physical therapy services though.  Patient was discharged from [facility] on 12/05/18 after total replacement of right hip. At that date, he was transferred to [place] on [facility] TCU, staying until 12/31/18. He then went home with Home Care Services that included skilled RN, Home Health Aide, PT and OT.    Outreach attempted x 1.  Left message on giftee with call back information and requested return call.    Plan: Care Coordinator will mail out care coordination introduction letter with care coordinator contact information and explanation of care coordination services, plus Access Care Plan and medication list. No further outreaches will be made at this time unless a new referral is made or a change in the pt's status occurs. Patient was provided with this writer's contact information and encouraged to call with any questions or concerns.      Diane Chaidez RN Care Coordinator  [facility]  Phone:  [phone]  Email: [email or web address]

## 2019-01-16 NOTE — LETTER
Health Care Home - Access Care Plan    About Me  Patient Name:  Antonio Ramirez    YOB: 1945  Age:                             73 year old   Keo MRN:            [id number] Telephone Information:   Home Phone [phone]   Mobile [phone]       Address:    [address]  [place] [address] Email address:  [email or web address])      Emergency Contact(s)  Name Relationship Lgl Grd Work Phone Home Phone Mobile Phone   1. MATT RAMIREZ Spouse No   [phone]   2. DIPIKA RMAIREZ  Daughter No   [phone]             Health Maintenance: Routine Health maintenance Reviewed: Up to date    My Access Plan  Medical Emergency 911   Questions or concerns during clinic hours Primary Clinic Line, I will call the clinic directly: [facility] [phone]   24 Hour Appointment Line [phone] or  [phone] [phone]) (toll free)   24 Hour Nurse Line [phone] (toll free)   Questions or concerns outside clinic hours 24 Hour Appointment Line, I will call the after-hours on-call line:   [facility] [phone] or [phone] ([phone]) (toll-free)   Preferred Urgent Care [facility], [phone]   Preferred Hospital [facility]  [phone]   Preferred Pharmacy [facility] [address] [place] [address] [address] AT [address] [place] [address]     Behavioral Health Crisis Line The National Suicide Prevention Lifeline at 1-890.767.9736 or 911     My Care Team Members  Patient Care Team       Relationship Specialty Notifications Start End    Main Hardy MD PCP - General Internal Medicine  5/24/17     Phone: [phone] Pager: [phone] Fax: [phone] [address] [place] [address]    Main Hardy MD PCP - Assigned PCP   2/25/18     Phone: [phone] Pager: [phone] Fax: [phone] [address] [place] [address]    Latasha Flower Home Care Nurse   2/1/18     [facility]  RN Case Manager    Diane Chaidez RN Clinic Care Coordinator Primary Care - CC  12/6/18     Care, [facility] ([facility] ([place])  12/31/18     Phone: [phone]                My Medical and Care Information  Problem List   Patient Active Problem List   Diagnosis     Alcohol abuse     Alcoholic cirrhosis of liver (H)     Chronic kidney disease, stage III (moderate) (H)     Physical deconditioning     Prostate cancer -- S/P Radiative Seed implants in 2000 (no problems since)     Antiphospholipid antibody syndrome (H)     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 (H)     Chronic congestive heart failure, unspecified congestive heart failure type     Thrombocytopenia -- suspect related to alcohol use     Subdural hematoma (H)     Benign essential hypertension     Malabsorption of iron     Calculi, ureter     Anemia     Coronary artery disease involving native coronary artery of native heart without angina pectoris     Aortic stenosis     Nonrheumatic mitral valve regurgitation     Nonrheumatic tricuspid valve regurgitation     Pulmonary hypertension (H)     Paroxysmal atrial fibrillation (H)     Ischemic cardiomyopathy     Aortic root dilatation (H)     Venous thrombosis     S/P total hip arthroplasty     History of left below knee amputation (H)     Acute cystitis without hematuria      Current Medications and Allergies:  See printed Medication Report

## 2019-01-16 NOTE — LETTER
[place] CARE COORDINATION  [address]  [place] [facility] [address]  January 16, 2019    Antonio Ramirez  [address]   [place] [address]      Dear Antonio,    I am a clinic care coordinator who works with Main Hardy MD at [facility]. I have been trying to reach you recently to introduce Clinic Care Coordination and to see if there was anything I could help you with. Below is a description of clinic care coordination and how I can further assist you.     The clinic care coordinator is a registered nurse and/or  who understand the health care system. The goal of clinic care coordination is to help you manage your health and improve access to the [facility] in the most efficient manner. The registered nurse can assist you in meeting your health care goals by providing education, coordinating services, and strengthening the communication among your providers. The  can assist you with financial, behavioral, psychosocial, chemical dependency, counseling, and/or psychiatric resources.    Please feel free to contact me at [phone], with any questions or concerns. We at [facility] are focused on providing you with the highest-quality healthcare experience possible and that all starts with you.     Sincerely,       Diane Chaidez RN Care Coordinator  [facility]  Phone:  [phone]  Email: [email or web address]    Enclosed: I have enclosed a copy of a 24 Hour Access Plan. This has helpful phone numbers for you to call when needed. Please keep this in an easy to access place to use as needed.

## 2019-02-06 ENCOUNTER — TELEPHONE (OUTPATIENT)
Dept: FAMILY MEDICINE | Facility: CLINIC | Age: 74
End: 2019-02-06

## 2019-02-06 NOTE — TELEPHONE ENCOUNTER
Reason for Call:  Home Health Care    Lucrecia with [facility] called regarding (reason for call): pt developed a laceration on right lower medial leg and the wound looks irritated and possible infected, but he is refusing nursing home care services and only allowing pt to see him.  Lucrecia is looking for recommendations of what to do to treat/ speed up healing process        Orders are needed for this patient.     PT:     OT:     Skilled Nursing:     Pt Provider: Dr. Hardy    Phone Number Homecare Nurse can be reached at: [phone]    Can we leave a detailed message on this number? YES    Phone number patient can be reached at: na    Best Time: any    Call taken on 2/6/2019 at 12:54 PM by Lisa Neri

## 2019-02-06 NOTE — TELEPHONE ENCOUNTER
"Returned call to [facility] to Lucrecia.  Patient is refusing nursing services. Per Lucrecia, patient does not like a lot of different services/people coming to see hime.  Laceration to RLE, \"does not look good\" per PT.  PT only sees patient weekly and states the wound was not there last week.  Patient states he has been changing the bandage every 2-3 days. VSS, afebrile.      Wound looks deeper, dark purple, no drainage, redness around the wound.  No pain, but patient has neuropathy.      Verbal orders given for WOC consult.    Routing to provider per [facility] protocol and advise as appropriate.    ROSY MurrellN, RN  [employer] Triage    "

## 2019-02-06 NOTE — TELEPHONE ENCOUNTER
Left detailed message for Lucrecia with HC with provider response below.    ROSY MurrellN, RN  [employer] Triage

## 2019-02-08 ENCOUNTER — TELEPHONE (OUTPATIENT)
Dept: FAMILY MEDICINE | Facility: CLINIC | Age: 74
End: 2019-02-08

## 2019-02-08 NOTE — TELEPHONE ENCOUNTER
Called and spoke to Parvin ROBINS from [facility]. Parvin reports that pt has had physical therapy only. However, the physical therapist noticed a leg wound on the right calf so a skilled nurse went out today to evaluate it.    Parvin ROBINS reports that pt hit his right leg with a screw from his prosthesis about 2 weeks ago. The area on the right leg is not hot to touch but has increased redness and swelling. Pt does not have a fever but the wound has been draining serosanguinous drainage. Parvin states that she dressed the wound and instructed pt to cleanse the wound daily with saline and reapply new dressing every day. Parvin left pt with dressing supplies. Parvin requested another order for another skilled nursing visit to re-evaluate the wound next week. Parvin informed that the order for another skilled nursing visit for next week was approved per Home Care protocol.    Parvin ROBINS is concerned because of his hx of necrotic tissue and amputation of the foot. Parvin wondering if an antibiotic would be beneficial in this situation?    Parvin does not know the pharmacy that pt uses. If  writes a script, then we would need to contact the pt to find out where to call it into.    Please review and advise

## 2019-02-08 NOTE — TELEPHONE ENCOUNTER
Parvin notified. Parvin asked that pt be called and informed of 's recommendation. Called and left a detailed message on voicemail for pt to either call back soon to schedule a team appointment this afternoon or to go to an Urgent care over the weekend to evaluate his health concern.

## 2019-02-08 NOTE — TELEPHONE ENCOUNTER
Reason for Call: Request for an order or referral:    Order or referral being requested: Pt has had wound on right interior calf for two weeks, is looking infected.  not hot to touch but at times is weeping.    Date needed: by next week    Has the patient been seen by the PCP for this problem? YES    Additional comments: Please advise home rosario care nurse on treatment please    Phone number Homecare  can be reached at:  [facility]  [phone]    Best Time:  anytime    Can we leave a detailed message on this number?  YES    Call taken on 2/8/2019 at 12:26 PM by Lynne Swift

## 2019-02-08 NOTE — TELEPHONE ENCOUNTER
Skilled nursing is fine, but he needs an appointment here or urgent care to look at wound and see what the best treatment would be    I recommend urgent care or team appointment today

## 2019-02-12 NOTE — TELEPHONE ENCOUNTER
Parvin with [facility] calling to give a follow up,   States the wound has improved but No home care nursing will be done in the future due to Patient request     Ph. [phone] PEDRITO is okay

## 2019-02-12 NOTE — TELEPHONE ENCOUNTER
Routing to Dr Hardy,  See note below.   Let us know if you need more details.    Angela Epperson RN- Triage [employer]

## 2019-02-17 ENCOUNTER — HOSPITAL ENCOUNTER (EMERGENCY)
Facility: CLINIC | Age: 74
Discharge: HOME | End: 2019-02-17
Attending: EMERGENCY MEDICINE | Admitting: EMERGENCY MEDICINE
Payer: MEDICARE

## 2019-02-17 ENCOUNTER — APPOINTMENT (OUTPATIENT)
Dept: GENERAL RADIOLOGY | Facility: CLINIC | Age: 74
End: 2019-02-17
Attending: EMERGENCY MEDICINE
Payer: MEDICARE

## 2019-02-17 ENCOUNTER — APPOINTMENT (OUTPATIENT)
Dept: CT IMAGING | Facility: CLINIC | Age: 74
End: 2019-02-17
Attending: EMERGENCY MEDICINE
Payer: MEDICARE

## 2019-02-17 VITALS
WEIGHT: 200 LBS | TEMPERATURE: 97.6 F | HEART RATE: 68 BPM | SYSTOLIC BLOOD PRESSURE: 102 MMHG | OXYGEN SATURATION: 99 % | DIASTOLIC BLOOD PRESSURE: 62 MMHG | HEIGHT: 74 IN | BODY MASS INDEX: 25.67 KG/M2 | RESPIRATION RATE: 16 BRPM

## 2019-02-17 DIAGNOSIS — S30.0XXA CONTUSION OF PELVIC REGION, INITIAL ENCOUNTER: ICD-10-CM

## 2019-02-17 PROCEDURE — 70450 CT HEAD/BRAIN W/O DYE: CPT

## 2019-02-17 PROCEDURE — 72170 X-RAY EXAM OF PELVIS: CPT

## 2019-02-17 PROCEDURE — 99284 EMERGENCY DEPT VISIT MOD MDM: CPT | Mod: 25

## 2019-02-17 PROCEDURE — A9270 NON-COVERED ITEM OR SERVICE: HCPCS | Mod: GY | Performed by: EMERGENCY MEDICINE

## 2019-02-17 PROCEDURE — 25000132 ZZH RX MED GY IP 250 OP 250 PS 637: Mod: GY | Performed by: EMERGENCY MEDICINE

## 2019-02-17 RX ORDER — TRAMADOL HYDROCHLORIDE 50 MG/1
50 TABLET ORAL EVERY 6 HOURS PRN
Qty: 10 TABLET | Refills: 0 | Status: SHIPPED | OUTPATIENT
Start: 2019-02-17 | End: 2019-02-28

## 2019-02-17 RX ORDER — ACETAMINOPHEN 500 MG
500 TABLET ORAL ONCE
Status: DISCONTINUED | OUTPATIENT
Start: 2019-02-17 | End: 2019-02-17 | Stop reason: HOSPADM

## 2019-02-17 ASSESSMENT — ENCOUNTER SYMPTOMS
MYALGIAS: 1
SHORTNESS OF BREATH: 0
ARTHRALGIAS: 1
DIZZINESS: 0

## 2019-02-17 ASSESSMENT — MIFFLIN-ST. JEOR: SCORE: 1711.94

## 2019-02-17 NOTE — ED AVS SNAPSHOT
Emergency Department  [address] [place] [id number]  Phone:  [phone]  Fax:  [phone]                                    Antonio Ramirez   MRN: [id number]    Department:   Emergency Department   Date of Visit:  2/17/2019           After Visit Summary Signature Page    I have received my discharge instructions, and my questions have been answered. I have discussed any challenges I see with this plan with the nurse or doctor.    ..........................................................................................................................................  Patient/Patient Representative Signature      ..........................................................................................................................................  Patient Representative Print Name and Relationship to Patient    ..................................................               ................................................  Date                                   Time    ..........................................................................................................................................  Reviewed by Signature/Title    ...................................................              ..............................................  Date                                               Time          22EPIC Rev 08/18

## 2019-02-17 NOTE — ED NOTES
Pt disappointed Dr. Knight would not prescribe dilaudid as pt asked for.  Pt educated by writer and . Pt verbalized understanding but again voiced dislike of the decision made

## 2019-02-17 NOTE — ED PROVIDER NOTES
History     Chief Complaint:  Fall    HPI   Antonio Ramirez is a 73 year old male, with a history of CAD, HTN, atrial fibrillation, CHF, prostate cancer, CKD, PE, heart attack and a subdural hematoma, who is on Xarelto, who presents with his wife to the emergency department for evaluation of a fall. The patient reports he tripped and fell last night, landing on his left side of his buttocks. He denies any dizziness, chest pain, or shortness of breath before his fall. He notes he did hit his head but denies any loss of consciousness. He reports he has been able to ambulate, but it is painful.    Allergies:  Ciprofloxacin: itching  Hmg-Coa-R inhibitors     Medications:    81 mg Aspirin  Coreg  Flonase  Gabapentin  Mycostatin  Xarelto  Torsemide    Past Medical History:    Cystitis  Anemia  CAD  Aortic stenosis  HTN  Atrial fibrillation  Cardiomyopathy  Aortic root dilation  Venous thrombosis  Subdural hematoma  Thrombocytopenia  CHF  Prostate cancer  Antiphospholipid antibody syndrome  Alcoholic cirrhosis  CKD  Alcoholism  Left leg amputated  Anticardiolipin antibody syndrome  Arthritis  GERD  Heart attack  Hiatal hernia  Hypercholesterolemia  Neuropathy  PE    Past Surgical History:    Left leg amputation below knee  Appendectomy  Right hip arthroplasty x2  CABG  Cholecystectomy  Craniotomy  Cataract surgery  GI surgery  Hernia repair  I & D  IR IVC filter placement  Lithotripsy x2  Total hip surgery  Elbow surgery    Family History:    Lung cancer  Heart failure    Social History:  Smoking status: Never  Alcohol use: Yes  The patient presents to the emergency department with his wife.  Marital Status:   [2]     Review of Systems   Respiratory: Negative for shortness of breath.    Cardiovascular: Negative for chest pain.   Musculoskeletal: Positive for arthralgias and myalgias.   Neurological: Negative for dizziness and syncope.   All other systems reviewed and are negative.      Physical Exam   Patient  "Vitals for the past 24 hrs:   BP Temp Temp src Pulse Resp SpO2 Height Weight   02/17/19 0908 108/53 97.6  F (36.4  C) Oral 68 16 98 % 1.88 m (6' 2\") 90.7 kg (200 lb)     Physical Exam  General/Appearance: appears stated age, well-groomed, appears comfortable  Eyes: EOMI, no scleral injection, no icterus  ENT: MMM  Neck: supple, nl ROM, no stiffness  Cardiovascular: RRR, nl S1S2, no m/r/g, 2+ pulses in all 4 extremities, cap refill <2sec  Respiratory: CTAB, good air movement throughout, no wheezes/rhonchi/rales, no increased WOB, no retractions  Back: no lesions  GI: abd soft, non-distended, nttp,  no HSM, no rebound, no guarding, nl BS  MSK: STRATTON, good tone, L BKA, no significant ttp to posterior L buttock  Skin: warm and well-perfused, no rash, no edema, no ecchymosis, nl turgor  Neuro: GCS 15, alert and oriented, no gross focal neuro deficits  Psych: interacts appropriately  Heme: no petechia, no purpura, no active bleeding  Emergency Department Course   Imaging:  Radiographic findings were communicated with the patient and family who voiced understanding of the findings.    XR Pelvis 1/2 Views  IMPRESSION: No fractures are identified. Bilateral hip arthroplasties  are noted. Metallic seeds are seen over the region of the prostate.  Degenerative changes are seen in the lower lumbar spine. Vascular  calcifications are noted.  As read by Radiology.    CT Head w/o Contrast  IMPRESSION: No acute pathology. No bleed, mass, or acute infarcts are  seen. No change compared to 9/17/2018.  As read by Radiology.    Interventions:  1023 Tylenol 500 mg PO    Emergency Department Course:  Past medical records, nursing notes, and vitals reviewed.  0918: I performed an exam of the patient and obtained history, as documented above.    The patient was sent for a pelvis x-ray and a head CT while in the emergency department, findings above.     1124: I rechecked the patient. Findings and plan explained to the Patient and spouse. " Patient discharged home with instructions regarding supportive care, medications, and reasons to return. The importance of close follow-up was reviewed.   Impression & Plan    Medical Decision Making:  This patient is a 73-year-old male who presents from a mechanical fall.  He fell onto his left buttock.  X-ray is negative.  He has been able to bear weight using his usual walker.  I have low suspicion for missed fracture.  He is on blood thinners and did hit his head.  Neurologically he is intact and stable.  Head CT is negative.  But he has requested Dilaudid here multiple times however I have explained to him that I do not feel Dilaudid, for a contusion, is appropriate.  I will write him some Ultram to help with the more acute pain.  He can be discharged home.    Diagnosis:    ICD-10-CM   1. Contusion of pelvic region, initial encounter S30.0XXA     Disposition:  Discharged to home with instructions for follow up.  Discharge Medications:  Started    traMADol 50 MG tablet  Commonly known as:  ULTRAM  50 mg, Oral, EVERY 6 HOURS PRN       Shyam Garcia  2/17/2019    EMERGENCY DEPARTMENT  Scribe Disclosure:  Shyam COUCH, am serving as a scribe at 9:18 AM on 2/17/2019 to document services personally performed by Deborah Knight MD based on my observations and the provider's statements to me.      Deborah Knight MD  02/17/19 1999

## 2019-02-18 ENCOUNTER — PATIENT OUTREACH (OUTPATIENT)
Dept: CARE COORDINATION | Facility: CLINIC | Age: 74
End: 2019-02-18

## 2019-02-18 ASSESSMENT — ACTIVITIES OF DAILY LIVING (ADL): DEPENDENT_IADLS:: CLEANING;COOKING;LAUNDRY;MEAL PREPARATION

## 2019-02-19 NOTE — PROGRESS NOTES
Clinic Care Coordination Contact  [facility]/Voicemail 2/18/19    Referral Source: ED Follow-Up    Clinical Data: Care Coordinator Outreach  Patient was seen/evaluated at the ED of [facility] on 2/17/19 for contusion of pelvic region. Patient was discharged home with Ultram prescription (50mg, oral, every 6 h PRN) and instruction to follow up with PCP as needed. Patient is being seen by PT and skilled RN from Home Care services.     Assessment:  Home Care Agency: [facility]  Contact name (): Eneida Barclay PT.  Care Coordination contacted home care: in previous encounter.  Anticipated start of care date: resumption/ongoing.    Patient Contact:   Outreach attempted x 1. Left message on voicemail with call back information and requested return call.  Introduced self/role of care coordination: Yes/No.   Discharge instructions were reviewed with patient: No.   Do you have any questions about your medications? n/a  Follow up appointment is scheduled for: n/a, just as needed.  Provided 24 Hour Nurse Line and/or 24 Hour Appointment Scheduling: Yes, in previous encounter.  Home care has contacted patient: Yes, it's ongoing.  Patient questions/concerns: n/a    Plan: RN Clinic Care Coordinator mailed out introduction letter and Access Care Plan on 1/3/19. Care Coordinator will try to reach patient again in 1 business day.     Diane Chaidez RN Care Coordinator  [facility]  Phone:  [phone] (Mondays, Wednesdays & Fridays)  Phone:  [phone] (Tuesdays & Thursdays)  Email: [email or web address]

## 2019-02-19 NOTE — PROGRESS NOTES
"Clinic Care Coordination Contact  OUTREACH 2/19/19    Referral Information:  Referral Source: ED Follow-Up    Primary Diagnosis: Injury/Fall    Chief Complaint   Patient presents with     Clinic Care Coordination - [facility]     ED discharge follow-up        Universal Utilization: Patient had a right hip replacement on 11/27/18. While in TCU, he had three visits to the ED: two related to ultrasound guided paracentesis and the other for altered mental status. He is currently at home and has PT through  Home Care.   Clinic Utilization  Difficulty keeping appointments: No  Compliance Concerns: No  No-Show Concerns: No  No PCP office visit in Past Year: No  Utilization    Last refreshed: 2/19/2019  1:15 PM:  Hospital Admissions 5           Last refreshed: 2/19/2019  1:15 PM:  ED Visits 3           Last refreshed: 2/19/2019  1:15 PM:  No Show Count (past year) 3              Current as of: 2/19/2019  1:15 PM            Clinical Concerns:  Current Medical Concerns:  RN Care Coordinator called patient and engaged in AIDET communication during encounter.   Patient stated he is still in a lot of pain and can barely move his left leg. He added: \"the medication I was prescribed at the ED doesn't do anything for me. It's like placebo. The only thing that works for me is Dilaudid, but nobody wants to give it to me. Dr. Reyes gave me the last time and there wasn't any problem with that.\" I reminded patient that was after his hip replacement surgery, which was appropriate. Patient rebutted that he had Dilaudid for pain for years, before that, and it was not a problem.     Current Behavioral Concerns: Patient is adamant the only medication that will help him with his pain is Dilaudid.      Education Provided to patient: none at this time.     Pain  Pain (GOAL): Yes  Type: Acute (<3mo)  Location of chronic pain: left leg  Progression: Unchanged  Description of pain: Aching  Chronic pain severity: 8  Limitation of routine " activities due to chronic pain: Yes  Description: Unable to perform most daily activities.  Alleviating Factors: none  Aggravating Factors: Activity, repositioning.  Health Maintenance Reviewed: Up to date  Clinical Pathway: None    Medication Management:  Patient was prescribed Ultram, 50 mg, orally, every 6 hours, as needed. Patient states that medication is not strong enough to control his pain.      Functional Status:  Dependent ADLs: Independent, Ambulation-cane  Dependent IADLs: Cleaning, Cooking, Laundry, Meal Preparation  Bed or wheelchair confined: No  Mobility Status: Independent w/Device  Fallen 2 or more times in the past year?: No  Any fall with injury in the past year?: Yes (this past ED visit)    Living Situation:  Current living arrangement: I live in a private home. Apartment.    Diet/Exercise/Sleep:  Food Insecurity: No  Tube Feeding: No  Exercise: Currently not exercising (only with PT)  Inadequate activity/exercise (GOAL): No  Significant changes in sleep pattern (GOAL): No    Transportation:  Transportation concerns (GOAL): No  Transportation means: Family, Regular car     Psychosocial:  Moravian or spiritual beliefs that impact treatment: No  Mental health DX: No (history of alcohol abuse)  Mental health management concern (GOAL): No  Informal Support system: Spouse     Financial/Insurance:   Financial/Insurance concerns (GOAL): No     Resources and Interventions:  Current Resources: none  List of home care services: Skilled Nursing, Physicial Therapy;    Supplies used at home: none  Advance Care Plan/Directive  Advanced Care Plans/Directives on file: No  Advanced Care Plan/Directive Status: Not Applicable      Plan:   Patient will have PT session at 11:00 am tomorrow.  RN Care Coordinator will follow up with patient after that.    Diane Chaidez RN Care Coordinator  [facility]  Phone:  [phone] (Mondays, Wednesdays & Fridays)  Phone:  [phone]  (Tuesdays & Thursdays)  Email: [email or web address]      Addendum - 2/20/19 - 5:30 pm  Physical therapist called the [facility] to report on patient' status. Patient and PT agreed on patient returning to the ED.  PCP was routed in the conversation and was in agreement.  [facility]  RN Care Coordinator will perform chart review in 2 days or earlier if there's a change in patient's status.

## 2019-02-20 ENCOUNTER — TELEPHONE (OUTPATIENT)
Dept: FAMILY MEDICINE | Facility: CLINIC | Age: 74
End: 2019-02-20

## 2019-02-20 ENCOUNTER — HOSPITAL ENCOUNTER (EMERGENCY)
Facility: CLINIC | Age: 74
Discharge: HOME | End: 2019-02-20
Attending: EMERGENCY MEDICINE | Admitting: EMERGENCY MEDICINE
Payer: MEDICARE

## 2019-02-20 ENCOUNTER — APPOINTMENT (OUTPATIENT)
Dept: ULTRASOUND IMAGING | Facility: CLINIC | Age: 74
End: 2019-02-20
Attending: EMERGENCY MEDICINE
Payer: MEDICARE

## 2019-02-20 VITALS
DIASTOLIC BLOOD PRESSURE: 48 MMHG | WEIGHT: 200 LBS | SYSTOLIC BLOOD PRESSURE: 99 MMHG | RESPIRATION RATE: 20 BRPM | BODY MASS INDEX: 26.51 KG/M2 | HEIGHT: 73 IN | OXYGEN SATURATION: 98 % | TEMPERATURE: 98.1 F

## 2019-02-20 DIAGNOSIS — S70.02XA CONTUSION OF LEFT HIP, INITIAL ENCOUNTER: ICD-10-CM

## 2019-02-20 DIAGNOSIS — M25.552 HIP PAIN, LEFT: ICD-10-CM

## 2019-02-20 PROCEDURE — A9270 NON-COVERED ITEM OR SERVICE: HCPCS | Mod: GY | Performed by: EMERGENCY MEDICINE

## 2019-02-20 PROCEDURE — 99284 EMERGENCY DEPT VISIT MOD MDM: CPT | Mod: 25

## 2019-02-20 PROCEDURE — 25000132 ZZH RX MED GY IP 250 OP 250 PS 637: Mod: GY | Performed by: EMERGENCY MEDICINE

## 2019-02-20 PROCEDURE — 93971 EXTREMITY STUDY: CPT | Mod: LT

## 2019-02-20 RX ORDER — OXYCODONE AND ACETAMINOPHEN 5; 325 MG/1; MG/1
1-2 TABLET ORAL EVERY 4 HOURS PRN
Qty: 12 TABLET | Refills: 0 | Status: SHIPPED | OUTPATIENT
Start: 2019-02-20 | End: 2019-02-28

## 2019-02-20 RX ORDER — OXYCODONE AND ACETAMINOPHEN 5; 325 MG/1; MG/1
2 TABLET ORAL ONCE
Status: COMPLETED | OUTPATIENT
Start: 2019-02-20 | End: 2019-02-20

## 2019-02-20 RX ADMIN — OXYCODONE HYDROCHLORIDE AND ACETAMINOPHEN 2 TABLET: 5; 325 TABLET ORAL at 15:56

## 2019-02-20 ASSESSMENT — ENCOUNTER SYMPTOMS
FEVER: 0
SHORTNESS OF BREATH: 0
MYALGIAS: 1

## 2019-02-20 ASSESSMENT — MIFFLIN-ST. JEOR: SCORE: 1696.07

## 2019-02-20 NOTE — TELEPHONE ENCOUNTER
"Routing to PCP as FYI--no action needed.          Eneida from [facility] called.  Currently with patient.      Patient fell 2-16-19 and was seen in ED.  Negative Xrays (pelvic, hip) and CT scans.     Today HC nurse reports:  Left medial thigh very swollen and hard (on same leg as BKA).    Patient complaining of \"a lot of pain\".    Denies redness or warmth, however patient reports thigh area swelling is new and worsening since seen in ED.    No pain relief with Tylenol.    Unable to bear weight.      Concerned about possible DVT.   Advised ED.   Patient and HC nurse agreed.  Will call 911.      Isabella PEREZ RN,BSN    "

## 2019-02-20 NOTE — ED AVS SNAPSHOT
Emergency Department  [address] [place] [address]  Phone:  [phone]  Fax:  [phone]                                    Antonio Ramirez   MRN: [id number]    Department:   Emergency Department   Date of Visit:  2/20/2019           After Visit Summary Signature Page    I have received my discharge instructions, and my questions have been answered. I have discussed any challenges I see with this plan with the nurse or doctor.    ..........................................................................................................................................  Patient/Patient Representative Signature      ..........................................................................................................................................  Patient Representative Print Name and Relationship to Patient    ..................................................               ................................................  Date                                   Time    ..........................................................................................................................................  Reviewed by Signature/Title    ...................................................              ..............................................  Date                                               Time          22EPIC Rev 08/18

## 2019-02-20 NOTE — ED PROVIDER NOTES
History     Chief Complaint:  Leg pain      HPI   Antonio Ramirez is a 73 year old male with a history of CAD, HTN, atrial fibrillation, CHF, prostate cancer, CKD, PE, heart attack and a subdural hematoma, below the knee left eg amputation on Xarelto who presents with left leg pain and swelling. The patient had a recent fall then was seen a few days later here and sent home with a short course of Ultram (see note below). Since being discharged, he has had continued pain, swelling, and bruising to his left posterior leg, with the most pain being in his left buttock. Today, he was unable to attach his prosthetic leg due to the swelling and his PCP was concerned about a blood clot and recommended he come to the ED. The patient denies any chest pain, shortness of breath, or fevers. He feels the Ultram he was sent home with has not been effective in controlling his pain.      2/17 ED NOTE  HPI   Antonio Ramirez is a 73 year old male, with a history of CAD, HTN, atrial fibrillation, CHF, prostate cancer, CKD, PE, heart attack and a subdural hematoma, who is on Xarelto, who presents with his wife to the emergency department for evaluation of a fall. The patient reports he tripped and fell last night, landing on his left side of his buttocks. He denies any dizziness, chest pain, or shortness of breath before his fall. He notes he did hit his head but denies any loss of consciousness. He reports he has been able to ambulate, but it is painful.    XR Pelvis 1/2 Views  IMPRESSION: No fractures are identified. Bilateral hip arthroplasties  are noted. Metallic seeds are seen over the region of the prostate.  Degenerative changes are seen in the lower lumbar spine. Vascular  calcifications are noted.  As read by Radiology.     CT Head w/o Contrast  IMPRESSION: No acute pathology. No bleed, mass, or acute infarcts are  seen. No change compared to 9/17/2018.  As read by Radiology.     Allergies:  Ciprofloxacin  Hmg-Coa-R  "Inhibitors     Medications:    Aspirin  Coreg  Flonase  Neurontin  Mycostatin   Torsemide  Ultram  Xarelto    Past Medical History:    Cystitis  Anemia  CAD  Aortic stenosis  HTN  Atrial fibrillation  Cardiomyopathy  Aortic root dilation  Venous thrombosis  Subdural hematoma  Thrombocytopenia  CHF  Prostate cancer  Antiphospholipid antibody syndrome  Alcoholic cirrhosis  CKD  Alcoholism  Left leg amputated  Anticardiolipin antibody syndrome  Arthritis  GERD  Heart attack  Hiatal hernia  Hypercholesterolemia  Neuropathy  PE     Past Surgical History:    Left leg amputation below knee  Appendectomy  Right hip arthroplasty x2  CABG  Cholecystectomy  Craniotomy  Cataract surgery  GI surgery  Hernia repair  I & D  IR IVC filter placement  Lithotripsy x2  Total hip surgery  Elbow surgery     Family History:    Lung cancer  Heart failure    Social History:  Patient presents with caretaker  Negative for tobacco use.  Positive for alcohol use.   Marital Status:       Review of Systems   Constitutional: Negative for fever.   Respiratory: Negative for shortness of breath.    Cardiovascular: Positive for leg swelling. Negative for chest pain.   Musculoskeletal: Positive for myalgias.   All other systems reviewed and are negative.    Physical Exam   First Vitals:  BP: 99/48  Heart Rate: 68  Temp: 98.1  F (36.7  C)  Resp: 20  Height: 185.4 cm (6' 1\")  Weight: 90.7 kg (200 lb)  SpO2: 98 %    Physical Exam  General: Alert, appears elderly, otherwise well-developed and well-nourished. Cooperative.     In no distress  HEENT:  Head:  Atraumatic  Ears:  External ears are normal  Mouth/Throat:  Oropharynx is without erythema or exudate and mucous membranes are moist.   Eyes:   Conjunctivae normal and EOM are normal. No scleral icterus.  CV:  Normal rate, regular rhythm, normal heart sounds and radial pulses are 2+ and symmetric.  Systolic murmur.  Resp:  Breath sounds are clear bilaterally    Non-labored, no retractions or " accessory muscle use  GI:  Abdomen is soft, no distension, no tenderness. No rebound or guarding.  No CVA tenderness bilaterally  MS:  LLE with BKA.  Firm left lateral thigh with no overlying skin color changes.  Tenderness to firm left lateral hip.    Back atraumatic.  Skin:  Warm and dry. Well healed scar to left thigh.  Neuro: Alert. Normal strength.  GCS: 15  Psych:  Normal mood and affect.    Emergency Department Course     Imaging:  Radiographic findings were communicated with the patient who voiced understanding of the findings.  US Lower extremity venous duplex  No DVT demonstrated.  Per radiology    Interventions:  1556 - Percocet 5-325 mg tablet, 2 tablets PO    Emergency Department Course:  Nursing notes and vitals reviewed.  1520: I performed an exam of the patient as documented above.     1755: I rechecked the patient. Findings and plan explained to the Patient. Patient discharged home with instructions regarding supportive care, medications, and reasons to return. The importance of close follow-up was reviewed.     Impression & Plan    Medical Decision Making:  Antonio Ramirez is a 73 year old male status post left hip arthroplasty who presents with left hip pain. Patient was evaluated three days ago after a fall landing on his buttocks and left hip. He had a pelvis x-ray performed at that time as well as a head CT which were both unremarkable. Patient does appear to have a firm, indurated mass to the left lateral hip suspicious for potential contusion or hematoma at the left hip. He is on Xarelto and higher risk for developing larger contusions. Patient ultimately had an ultrasound today with no evidence of DVT. I did discuss at bedside that we may need to get a CT scan of the left hip or pelvis to evaluate for potential occult fractures not well seen on x-ray imaging . Patient declined CT imaging at this time and did understand that we may be missing a small fracture not well seen on x-rays three  days ago. Patient preferred to follow up closely with his primary care provider tomorrow although we did discuss that his primary doctor may end up sending him back to the emergency department if he were to have worsening pain. Patient understood this and understood the potential of missing a critical diagnosis today, such as hip fracture. He will be sent home with a limited amount of pain medication for the continued suspected left hip contusion. Return precautions were understood and all questions were answered prior to discharge. Discharged home.      Diagnosis:    ICD-10-CM    1. Hip pain, left M25.552    2. Contusion of left hip, initial encounter S70.02XA        Disposition:  discharged to home    Discharge Medications:     Medication List      Started    oxyCODONE-acetaminophen 5-325 MG tablet  Commonly known as:  PERCOCET  1-2 tablets, Oral, EVERY 4 HOURS PRN          I, Lazaro Mahajan, am serving as a scribe on 2/20/2019 at 4:50 PM to personally document services performed by Coy Coleman MD based on my observations and the provider's statements to me.       Lazaro Mahajan  2/20/2019    EMERGENCY DEPARTMENT       Cyo Coleman MD  02/20/19 1954

## 2019-02-21 ASSESSMENT — ACTIVITIES OF DAILY LIVING (ADL): DEPENDENT_IADLS:: CLEANING;COOKING;LAUNDRY;MEAL PREPARATION

## 2019-02-21 NOTE — PROGRESS NOTES
Clinic Care Coordination Contact - Chart review    Situation: Patient chart reviewed by care coordinator.    Background: On 2/20/19, patient went back to the ED of [facility] for unresolved pain after contusion of pelvic region secondary to mechanical fall on 2/16/19. He was discharged home with new medication prescription and recommendation to follow up with PCP on 2/21/19 (today).  Of note: patient was seen at the ED of [facility] on 2/17/19 for pain related to the contusion before mentioned, and was discharged home. Refer to [facility] from 2/19/19.     Assessment: Patient has appointment scheduled with Dr. Hardy, PCP, today at 1:30 pm.    Plan/Recommendations: Patient will go to scheduled appointment.  RN Care Coordinator will review patient's chart in 3-5 business days, unless a new referral is made or a change in the patient's status occurs. At that time, RN CC will determine if a contact with patient for a more comprehensive assessment is warranted.     Diane Chaidez RN Care Coordinator  [facility]  Phone:  [phone] (Mondays, Wednesdays & Fridays)  Phone:  [phone] (Tuesdays & Thursdays)  Email: [email or web address]

## 2019-02-28 ENCOUNTER — APPOINTMENT (OUTPATIENT)
Dept: CT IMAGING | Facility: CLINIC | Age: 74
DRG: 605 | End: 2019-02-28
Attending: EMERGENCY MEDICINE
Payer: MEDICARE

## 2019-02-28 ENCOUNTER — APPOINTMENT (OUTPATIENT)
Dept: ULTRASOUND IMAGING | Facility: CLINIC | Age: 74
DRG: 605 | End: 2019-02-28
Attending: EMERGENCY MEDICINE
Payer: MEDICARE

## 2019-02-28 ENCOUNTER — HOSPITAL ENCOUNTER (INPATIENT)
Facility: CLINIC | Age: 74
LOS: 14 days | Discharge: NURSING, REHAB OR LONG-TERM CARE | DRG: 605 | End: 2019-03-14
Attending: EMERGENCY MEDICINE | Admitting: INTERNAL MEDICINE
Payer: MEDICARE

## 2019-02-28 ENCOUNTER — PATIENT OUTREACH (OUTPATIENT)
Dept: CARE COORDINATION | Facility: CLINIC | Age: 74
End: 2019-02-28

## 2019-02-28 DIAGNOSIS — Z96.641 STATUS POST TOTAL REPLACEMENT OF RIGHT HIP: ICD-10-CM

## 2019-02-28 DIAGNOSIS — D62 ANEMIA DUE TO BLOOD LOSS, ACUTE: ICD-10-CM

## 2019-02-28 DIAGNOSIS — N17.9 ACUTE RENAL FAILURE, UNSPECIFIED ACUTE RENAL FAILURE TYPE (H): ICD-10-CM

## 2019-02-28 DIAGNOSIS — J30.9 ALLERGIC RHINITIS, UNSPECIFIED SEASONALITY, UNSPECIFIED TRIGGER: ICD-10-CM

## 2019-02-28 DIAGNOSIS — I25.5 ISCHEMIC CARDIOMYOPATHY: ICD-10-CM

## 2019-02-28 DIAGNOSIS — K70.31 ALCOHOLIC CIRRHOSIS OF LIVER WITH ASCITES (H): ICD-10-CM

## 2019-02-28 DIAGNOSIS — Z92.29 HISTORY OF ANTICOAGULANT USE: ICD-10-CM

## 2019-02-28 DIAGNOSIS — J69.0 ASPIRATION PNEUMONITIS (H): Primary | ICD-10-CM

## 2019-02-28 DIAGNOSIS — Z86.79 HISTORY OF ATRIAL FIBRILLATION: ICD-10-CM

## 2019-02-28 DIAGNOSIS — F10.10 ALCOHOL ABUSE: ICD-10-CM

## 2019-02-28 DIAGNOSIS — I27.20 PULMONARY HYPERTENSION (H): ICD-10-CM

## 2019-02-28 DIAGNOSIS — S80.12XA LEG HEMATOMA, LEFT, INITIAL ENCOUNTER: ICD-10-CM

## 2019-02-28 DIAGNOSIS — I48.0 PAROXYSMAL ATRIAL FIBRILLATION (H): ICD-10-CM

## 2019-02-28 DIAGNOSIS — R78.81 GRAM-POSITIVE BACTEREMIA: ICD-10-CM

## 2019-02-28 PROBLEM — S30.0XXA TRAUMATIC HEMATOMA OF BUTTOCK: Status: ACTIVE | Noted: 2019-02-28

## 2019-02-28 PROBLEM — N18.30 CRF (CHRONIC RENAL FAILURE), STAGE 3 (MODERATE) (H): Status: ACTIVE | Noted: 2019-02-28

## 2019-02-28 PROBLEM — Z89.512 HISTORY OF LEFT BELOW KNEE AMPUTATION (H): Status: ACTIVE | Noted: 2018-12-06

## 2019-02-28 PROBLEM — E87.5 HYPERKALEMIA: Status: ACTIVE | Noted: 2017-12-17

## 2019-02-28 PROBLEM — C61 PROSTATE CANCER (H): Status: ACTIVE | Noted: 2017-05-24

## 2019-02-28 PROBLEM — I10 BENIGN ESSENTIAL HYPERTENSION: Status: ACTIVE | Noted: 2018-04-27

## 2019-02-28 LAB
ABO + RH BLD: NORMAL
ABO + RH BLD: NORMAL
ALBUMIN SERPL-MCNC: 2.7 G/DL (ref 3.4–5)
ALBUMIN UR-MCNC: 10 MG/DL
ALP SERPL-CCNC: 90 U/L (ref 40–150)
ALT SERPL W P-5'-P-CCNC: 11 U/L (ref 0–70)
ANION GAP SERPL CALCULATED.3IONS-SCNC: 4 MMOL/L (ref 3–14)
APPEARANCE UR: CLEAR
APTT PPP: 66 SEC (ref 22–37)
AST SERPL W P-5'-P-CCNC: 24 U/L (ref 0–45)
BACTERIA #/AREA URNS HPF: ABNORMAL /HPF
BASOPHILS # BLD AUTO: 0 10E9/L (ref 0–0.2)
BASOPHILS NFR BLD AUTO: 0.6 %
BILIRUB DIRECT SERPL-MCNC: 1.4 MG/DL (ref 0–0.2)
BILIRUB SERPL-MCNC: 2.9 MG/DL (ref 0.2–1.3)
BILIRUB UR QL STRIP: NEGATIVE
BLD GP AB SCN SERPL QL: NORMAL
BLD PROD TYP BPU: NORMAL
BLD PROD TYP BPU: NORMAL
BLD UNIT ID BPU: 0
BLOOD BANK CMNT PATIENT-IMP: NORMAL
BLOOD PRODUCT CODE: NORMAL
BPU ID: NORMAL
BUN SERPL-MCNC: 65 MG/DL (ref 7–30)
CALCIUM SERPL-MCNC: 8.5 MG/DL (ref 8.5–10.1)
CHLORIDE SERPL-SCNC: 101 MMOL/L (ref 94–109)
CO2 SERPL-SCNC: 26 MMOL/L (ref 20–32)
COLOR UR AUTO: YELLOW
CREAT SERPL-MCNC: 1.96 MG/DL (ref 0.66–1.25)
DIFFERENTIAL METHOD BLD: ABNORMAL
EOSINOPHIL # BLD AUTO: 0.1 10E9/L (ref 0–0.7)
EOSINOPHIL NFR BLD AUTO: 1.8 %
ERYTHROCYTE [DISTWIDTH] IN BLOOD BY AUTOMATED COUNT: 17.2 % (ref 10–15)
GFR SERPL CREATININE-BSD FRML MDRD: 32 ML/MIN/{1.73_M2}
GLUCOSE SERPL-MCNC: 111 MG/DL (ref 70–99)
GLUCOSE UR STRIP-MCNC: NEGATIVE MG/DL
HCT VFR BLD AUTO: 19.5 % (ref 40–53)
HEMOCCULT STL QL: NEGATIVE
HGB BLD-MCNC: 6.3 G/DL (ref 13.3–17.7)
HGB UR QL STRIP: NEGATIVE
HYALINE CASTS #/AREA URNS LPF: 3 /LPF (ref 0–2)
IMM GRANULOCYTES # BLD: 0 10E9/L (ref 0–0.4)
IMM GRANULOCYTES NFR BLD: 0.3 %
INR PPP: 7.45 (ref 0.86–1.14)
INTERPRETATION ECG - MUSE: NORMAL
KETONES UR STRIP-MCNC: NEGATIVE MG/DL
LEUKOCYTE ESTERASE UR QL STRIP: ABNORMAL
LYMPHOCYTES # BLD AUTO: 1.1 10E9/L (ref 0.8–5.3)
LYMPHOCYTES NFR BLD AUTO: 16.5 %
MCH RBC QN AUTO: 32.6 PG (ref 26.5–33)
MCHC RBC AUTO-ENTMCNC: 32.3 G/DL (ref 31.5–36.5)
MCV RBC AUTO: 101 FL (ref 78–100)
MONOCYTES # BLD AUTO: 0.7 10E9/L (ref 0–1.3)
MONOCYTES NFR BLD AUTO: 10.1 %
NEUTROPHILS # BLD AUTO: 4.8 10E9/L (ref 1.6–8.3)
NEUTROPHILS NFR BLD AUTO: 70.7 %
NITRATE UR QL: NEGATIVE
NRBC # BLD AUTO: 0 10*3/UL
NRBC BLD AUTO-RTO: 0 /100
NUM BPU REQUESTED: 1
PH UR STRIP: 5.5 PH (ref 5–7)
PLATELET # BLD AUTO: 229 10E9/L (ref 150–450)
POTASSIUM SERPL-SCNC: 5.4 MMOL/L (ref 3.4–5.3)
PROT SERPL-MCNC: 6.9 G/DL (ref 6.8–8.8)
RBC # BLD AUTO: 1.93 10E12/L (ref 4.4–5.9)
RBC #/AREA URNS AUTO: 1 /HPF (ref 0–2)
SODIUM SERPL-SCNC: 131 MMOL/L (ref 133–144)
SOURCE: ABNORMAL
SP GR UR STRIP: 1.01 (ref 1–1.03)
SPECIMEN EXP DATE BLD: NORMAL
SQUAMOUS #/AREA URNS AUTO: 1 /HPF (ref 0–1)
TRANSFUSION STATUS PATIENT QL: NORMAL
TRANSFUSION STATUS PATIENT QL: NORMAL
TROPONIN I SERPL-MCNC: 0.03 UG/L (ref 0–0.04)
UROBILINOGEN UR STRIP-MCNC: 2 MG/DL (ref 0–2)
WBC # BLD AUTO: 6.8 10E9/L (ref 4–11)
WBC #/AREA URNS AUTO: 11 /HPF (ref 0–5)

## 2019-02-28 PROCEDURE — 36430 TRANSFUSION BLD/BLD COMPNT: CPT

## 2019-02-28 PROCEDURE — 80048 BASIC METABOLIC PNL TOTAL CA: CPT | Performed by: EMERGENCY MEDICINE

## 2019-02-28 PROCEDURE — 12000000 ZZH R&B MED SURG/OB

## 2019-02-28 PROCEDURE — 85025 COMPLETE CBC W/AUTO DIFF WBC: CPT | Performed by: EMERGENCY MEDICINE

## 2019-02-28 PROCEDURE — 84484 ASSAY OF TROPONIN QUANT: CPT | Performed by: EMERGENCY MEDICINE

## 2019-02-28 PROCEDURE — 99223 1ST HOSP IP/OBS HIGH 75: CPT | Mod: AI | Performed by: INTERNAL MEDICINE

## 2019-02-28 PROCEDURE — HZ2ZZZZ DETOXIFICATION SERVICES FOR SUBSTANCE ABUSE TREATMENT: ICD-10-PCS | Performed by: INTERNAL MEDICINE

## 2019-02-28 PROCEDURE — 82272 OCCULT BLD FECES 1-3 TESTS: CPT | Performed by: EMERGENCY MEDICINE

## 2019-02-28 PROCEDURE — 93005 ELECTROCARDIOGRAM TRACING: CPT

## 2019-02-28 PROCEDURE — 25800030 ZZH RX IP 258 OP 636: Performed by: INTERNAL MEDICINE

## 2019-02-28 PROCEDURE — 85730 THROMBOPLASTIN TIME PARTIAL: CPT | Performed by: EMERGENCY MEDICINE

## 2019-02-28 PROCEDURE — 25000132 ZZH RX MED GY IP 250 OP 250 PS 637: Mod: GY | Performed by: EMERGENCY MEDICINE

## 2019-02-28 PROCEDURE — 86850 RBC ANTIBODY SCREEN: CPT | Performed by: EMERGENCY MEDICINE

## 2019-02-28 PROCEDURE — 93971 EXTREMITY STUDY: CPT | Mod: LT

## 2019-02-28 PROCEDURE — 86901 BLOOD TYPING SEROLOGIC RH(D): CPT | Performed by: EMERGENCY MEDICINE

## 2019-02-28 PROCEDURE — 25000128 H RX IP 250 OP 636: Performed by: EMERGENCY MEDICINE

## 2019-02-28 PROCEDURE — P9016 RBC LEUKOCYTES REDUCED: HCPCS | Performed by: EMERGENCY MEDICINE

## 2019-02-28 PROCEDURE — 96374 THER/PROPH/DIAG INJ IV PUSH: CPT

## 2019-02-28 PROCEDURE — 86923 COMPATIBILITY TEST ELECTRIC: CPT | Performed by: EMERGENCY MEDICINE

## 2019-02-28 PROCEDURE — 85610 PROTHROMBIN TIME: CPT | Performed by: EMERGENCY MEDICINE

## 2019-02-28 PROCEDURE — A9270 NON-COVERED ITEM OR SERVICE: HCPCS | Mod: GY | Performed by: INTERNAL MEDICINE

## 2019-02-28 PROCEDURE — 80076 HEPATIC FUNCTION PANEL: CPT | Performed by: EMERGENCY MEDICINE

## 2019-02-28 PROCEDURE — 25000132 ZZH RX MED GY IP 250 OP 250 PS 637: Mod: GY | Performed by: INTERNAL MEDICINE

## 2019-02-28 PROCEDURE — 86900 BLOOD TYPING SEROLOGIC ABO: CPT | Performed by: EMERGENCY MEDICINE

## 2019-02-28 PROCEDURE — 73700 CT LOWER EXTREMITY W/O DYE: CPT | Mod: LT

## 2019-02-28 PROCEDURE — 81001 URINALYSIS AUTO W/SCOPE: CPT | Performed by: EMERGENCY MEDICINE

## 2019-02-28 PROCEDURE — 25000128 H RX IP 250 OP 636: Performed by: INTERNAL MEDICINE

## 2019-02-28 PROCEDURE — 99285 EMERGENCY DEPT VISIT HI MDM: CPT | Mod: 25

## 2019-02-28 PROCEDURE — A9270 NON-COVERED ITEM OR SERVICE: HCPCS | Mod: GY | Performed by: EMERGENCY MEDICINE

## 2019-02-28 RX ORDER — AMOXICILLIN 250 MG
1 CAPSULE ORAL 2 TIMES DAILY PRN
Status: DISCONTINUED | OUTPATIENT
Start: 2019-02-28 | End: 2019-03-14 | Stop reason: HOSPADM

## 2019-02-28 RX ORDER — ASPIRIN 81 MG/1
81 TABLET ORAL DAILY
Status: DISCONTINUED | OUTPATIENT
Start: 2019-03-02 | End: 2019-03-01

## 2019-02-28 RX ORDER — FENTANYL CITRATE 50 UG/ML
50 INJECTION, SOLUTION INTRAMUSCULAR; INTRAVENOUS
Status: DISCONTINUED | OUTPATIENT
Start: 2019-02-28 | End: 2019-02-28

## 2019-02-28 RX ORDER — NALOXONE HYDROCHLORIDE 0.4 MG/ML
.1-.4 INJECTION, SOLUTION INTRAMUSCULAR; INTRAVENOUS; SUBCUTANEOUS
Status: DISCONTINUED | OUTPATIENT
Start: 2019-02-28 | End: 2019-03-14 | Stop reason: HOSPADM

## 2019-02-28 RX ORDER — ACETAMINOPHEN 325 MG/1
650 TABLET ORAL EVERY 4 HOURS PRN
Status: DISCONTINUED | OUTPATIENT
Start: 2019-02-28 | End: 2019-03-14 | Stop reason: HOSPADM

## 2019-02-28 RX ORDER — FENTANYL CITRATE 50 UG/ML
25 INJECTION, SOLUTION INTRAMUSCULAR; INTRAVENOUS
Status: DISCONTINUED | OUTPATIENT
Start: 2019-02-28 | End: 2019-02-28

## 2019-02-28 RX ORDER — IPRATROPIUM BROMIDE 42 UG/1
2 SPRAY, METERED NASAL 4 TIMES DAILY PRN
Status: DISCONTINUED | OUTPATIENT
Start: 2019-02-28 | End: 2019-03-14 | Stop reason: HOSPADM

## 2019-02-28 RX ORDER — OXYCODONE AND ACETAMINOPHEN 5; 325 MG/1; MG/1
1 TABLET ORAL ONCE
Status: COMPLETED | OUTPATIENT
Start: 2019-02-28 | End: 2019-02-28

## 2019-02-28 RX ORDER — LORAZEPAM 2 MG/ML
.5-1 INJECTION INTRAMUSCULAR EVERY 4 HOURS PRN
Status: DISCONTINUED | OUTPATIENT
Start: 2019-02-28 | End: 2019-03-02

## 2019-02-28 RX ORDER — ONDANSETRON 4 MG/1
4 TABLET, ORALLY DISINTEGRATING ORAL EVERY 6 HOURS PRN
Status: DISCONTINUED | OUTPATIENT
Start: 2019-02-28 | End: 2019-03-14 | Stop reason: HOSPADM

## 2019-02-28 RX ORDER — OXYCODONE HYDROCHLORIDE 5 MG/1
5 TABLET ORAL EVERY 4 HOURS PRN
Status: DISCONTINUED | OUTPATIENT
Start: 2019-02-28 | End: 2019-03-14 | Stop reason: HOSPADM

## 2019-02-28 RX ORDER — LANOLIN ALCOHOL/MO/W.PET/CERES
100 CREAM (GRAM) TOPICAL DAILY
Status: DISCONTINUED | OUTPATIENT
Start: 2019-02-28 | End: 2019-03-01

## 2019-02-28 RX ORDER — CARVEDILOL 3.12 MG/1
3.12 TABLET ORAL 2 TIMES DAILY WITH MEALS
Status: DISCONTINUED | OUTPATIENT
Start: 2019-03-01 | End: 2019-03-14 | Stop reason: HOSPADM

## 2019-02-28 RX ORDER — POLYETHYLENE GLYCOL 3350 17 G/17G
17 POWDER, FOR SOLUTION ORAL DAILY PRN
Status: DISCONTINUED | OUTPATIENT
Start: 2019-02-28 | End: 2019-03-01

## 2019-02-28 RX ORDER — GABAPENTIN 600 MG/1
600 TABLET ORAL 2 TIMES DAILY
Status: DISCONTINUED | OUTPATIENT
Start: 2019-02-28 | End: 2019-03-01

## 2019-02-28 RX ORDER — IPRATROPIUM BROMIDE 42 UG/1
2 SPRAY, METERED NASAL 4 TIMES DAILY
Status: ON HOLD | COMMUNITY
End: 2019-03-05

## 2019-02-28 RX ORDER — TORSEMIDE 10 MG/1
20 TABLET ORAL DAILY
Status: DISCONTINUED | OUTPATIENT
Start: 2019-03-01 | End: 2019-03-01

## 2019-02-28 RX ORDER — SODIUM CHLORIDE 9 MG/ML
INJECTION, SOLUTION INTRAVENOUS CONTINUOUS
Status: DISCONTINUED | OUTPATIENT
Start: 2019-02-28 | End: 2019-03-01

## 2019-02-28 RX ORDER — ONDANSETRON 2 MG/ML
4 INJECTION INTRAMUSCULAR; INTRAVENOUS EVERY 6 HOURS PRN
Status: DISCONTINUED | OUTPATIENT
Start: 2019-02-28 | End: 2019-03-14 | Stop reason: HOSPADM

## 2019-02-28 RX ORDER — AMOXICILLIN 250 MG
2 CAPSULE ORAL 2 TIMES DAILY PRN
Status: DISCONTINUED | OUTPATIENT
Start: 2019-02-28 | End: 2019-03-14 | Stop reason: HOSPADM

## 2019-02-28 RX ADMIN — FENTANYL CITRATE 50 MCG: 50 INJECTION, SOLUTION INTRAMUSCULAR; INTRAVENOUS at 17:29

## 2019-02-28 RX ADMIN — SODIUM CHLORIDE: 9 INJECTION, SOLUTION INTRAVENOUS at 22:46

## 2019-02-28 RX ADMIN — SODIUM CHLORIDE 1000 ML: 9 INJECTION, SOLUTION INTRAVENOUS at 16:30

## 2019-02-28 RX ADMIN — OXYCODONE HYDROCHLORIDE 5 MG: 5 TABLET ORAL at 22:25

## 2019-02-28 RX ADMIN — GABAPENTIN 600 MG: 600 TABLET, FILM COATED ORAL at 22:25

## 2019-02-28 RX ADMIN — Medication 100 MG: at 22:25

## 2019-02-28 RX ADMIN — PHYTONADIONE 2 MG: 2 INJECTION, EMULSION INTRAMUSCULAR; INTRAVENOUS; SUBCUTANEOUS at 22:03

## 2019-02-28 RX ADMIN — Medication 1 MG: at 22:49

## 2019-02-28 RX ADMIN — OXYCODONE HYDROCHLORIDE AND ACETAMINOPHEN 1 TABLET: 5; 325 TABLET ORAL at 16:30

## 2019-02-28 ASSESSMENT — ENCOUNTER SYMPTOMS
WEAKNESS: 1
FEVER: 0
DIFFICULTY URINATING: 0
ABDOMINAL PAIN: 0
DYSURIA: 1
CONSTIPATION: 1
SHORTNESS OF BREATH: 0

## 2019-02-28 ASSESSMENT — ACTIVITIES OF DAILY LIVING (ADL): DEPENDENT_IADLS:: CLEANING;COOKING;LAUNDRY;MEAL PREPARATION

## 2019-02-28 ASSESSMENT — MIFFLIN-ST. JEOR: SCORE: 1696.07

## 2019-02-28 NOTE — ED PROVIDER NOTES
"  History     Chief Complaint:  Leg & Buttock Pain    HPI   Antonio Ramirez is a 73 year old male, anticoagulated on Xarelto, with a history notable for atrial fibrillation, CAD, CKD, and s/p left below the knee amputation who presents via EMS for evaluation of buttock pain. He had a fall 10 days ago, and this is his third visit to the ED for weakness related to this. He did not have any pain prior to the fall. Over the last few days, the patient reports he has had \"unbearable pain\" to his left buttock that is especially bad at night. He also notes worsened bleeding behind his leg at night. Today, he had an appointment scheduled with his primary physician, Dr. Hrady, but when the handicap-accessible cab did not arrive, he decided to call EMS who brought him to the ED. En route to the ED, he received some fentanyl. Here, he reports the pain is at a 9-10. He does note the PRN Percocet that he received at his last visit has been helping relieve the pain.  His wife also reports he has been increasingly weak since his last visit. He also reports slight dysuria and infrequent bowel movements. He denies any fevers, chest pain, dyspnea, or abdominal pain. He has been wearing his prosthesis since his last ED visit, but he does report being bed bound since even before his fall. Of note, he has PT at home, but reports that he would like to be in the hospital for as long as it takes to get into [facility] for rehab. He has been there once before.     Allergies:  Ciprofloxacin  Hmg-Coa-R inhibitors     Medications:    Baby aspirin  Carvedilol  Gabapentin  Torsemide  Xarelto    Past Medical History:    Alcoholism  Alcoholic cirrhosis   CKD  SDH  Left leg amputation  Anemia  Anticardiolipin antibody syndrome  Aortic root dilatation  Arthritis  CAD  GERD  MI  Hiatal hernia  Hypercholesterolemia  HTN  Ischemic cardiomyopathy  Liver disease  Low grade B cell lymphoproliferative disorder  Monoclonal gammopathy  A. Fib.  PE  Prostate " cancer  Renal disease  Thrombocytopenia    Past Surgical History:    BKA - left  Appendectomy  Right hip arthroplasty  Right knee arthroplasty  CABG  Cholecystectomy  Ureter stent placement and removal  Craniotomy   EGD  Cataract removal  Umbilical hernia repair   IVC filter placement                   Multiple I&Ds of left lower extremity   Bilateral  Laser holmium lithotripsy ureters, stent insertion   Right knee arthroscopy  Total hip replacement  Elbow surgery    Family History:    Lung Cancer  CHF    Social History:  Marital Status:   [2]  Presents via EMS   Negative for tobacco use.  Positive for alcohol use.                                          Review of Systems   Constitutional: Negative for fever.   Respiratory: Negative for shortness of breath.    Cardiovascular: Negative for chest pain.   Gastrointestinal: Positive for constipation. Negative for abdominal pain.   Genitourinary: Positive for dysuria. Negative for difficulty urinating.   Musculoskeletal: Positive for gait problem.   Neurological: Positive for weakness.   All other systems reviewed and are negative.      Physical Exam     Patient Vitals for the past 24 hrs:   BP Temp Temp src Pulse Heart Rate Resp SpO2 Height Weight   03/01/19 0000 95/56 97.8  F (36.6  C) Oral 54 -- 16 97 % -- --   02/28/19 2106 95/57 97.5  F (36.4  C) Oral -- 54 14 99 % -- --   02/28/19 2048 104/66 97.5  F (36.4  C) Oral 56 52 -- 98 % -- --   02/28/19 2046 94/58 -- -- 56 53 -- -- -- --   02/28/19 2001 102/62 -- -- -- -- -- 97 % -- --   02/28/19 2000 102/62 -- -- 55 53 -- 97 % -- --   02/28/19 1940 -- -- -- -- -- 13 98 % -- --   02/28/19 1930 105/53 -- -- -- -- -- -- -- --   02/28/19 1905 100/62 -- -- 55 57 -- -- -- --   02/28/19 1845 100/60 98.7  F (37.1  C) Oral 52 -- -- -- -- --   02/28/19 1840 104/55 -- -- -- 60 -- -- -- --   02/28/19 1835 103/60 -- -- -- 59 -- -- -- --   02/28/19 1830 104/64 98.4  F (36.9  C) -- 55 -- -- -- -- --   02/28/19 1700 107/64 -- --  "-- 63 -- -- -- --   02/28/19 1645 -- -- -- -- 58 -- -- -- --   02/28/19 1630 107/66 -- -- 66 -- -- -- -- --   02/28/19 1600 96/58 -- -- -- -- -- -- -- --   02/28/19 1539 106/58 -- -- -- -- -- -- -- --   02/28/19 1536 -- 99  F (37.2  C) Oral 58 -- -- 97 % 1.854 m (6' 1\") 90.7 kg (200 lb)     Physical Exam  General: Alert, appears pale, frail and elderly. Cooperative.     In mild distress, appears uncomfortable.  HEENT:  Head:  Atraumatic  Ears:  External ears are normal  Mouth/Throat:  Oropharynx is without erythema or exudate and mucous membranes are dry.   Eyes:   Conjunctivae normal and EOM are normal. Scleral pallor.  CV:  Irregular rate, irregular rhythm, normal heart sounds and radial pulses are 2+ and symmetric.  Systolic murmur.  Resp:  Breath sounds are clear bilaterally    Non-labored, no retractions or accessory muscle use  GI:  Abdomen is soft, no distension, no tenderness. No rebound or guarding.  No CVA tenderness bilaterally  Rectal: No hemorrhoids.  Brown stool.  No pain on exam.   MS:  2+ pitting edema to LLE. LLE with BKA.  Significant bruising and hematoma throughout LLE.      Back atraumatic.    No midline cervical, thoracic, or lumbar tenderness  Skin:  Warm and dry.  Skin breakdown to distal left lower extremity with no surrounding signs of cellulitis.  Stage I pressure ulcer to sacrum.  Bruising to left buttock and left thigh consistent with extremity contusion/hematoma.   Neuro: Alert. GCS: 15  Psych:  Normal mood and affect.    Emergency Department Course   ECG:  Indication: Weakness  Time: 1626  Vent. Rate 57 bpm. FL interval *. QRS duration 78. QT/QTc 472/459. P-R-T axis * 92 97.   Atrial fibrillation with slow ventricular response.  Rightward axis. Nonspecific ST Abnormality.   No significant change compared to EKG dated 11/29/18. Read time: 1630.    Imaging:  Radiographic findings were communicated with the patient who voiced understanding of the findings.  US Lower Extremity Venous " Duplex, left:  No evidence of deep venous thrombosis within the left  lower extremity, as per radiology.     CT Hip Left without contrast:   Multiple intramuscular hematomas throughout the left lower  extremity most conspicuously involving the gluteus musculature and  posterior compartment, incompletely characterized. Diffuse  edema/swelling throughout the visualized left upper extremity, as per radiology.     CTA Lower Extremity w/ contrast, Left:   Pending    Laboratory:  CBC: WBC: 6.8, HGB: 6.3 (LL), PLT: 229  BMP: Glucose 111 (H), Na: 131 (L), K: 5.4 (H), BUN: 65 (H), GFR: 32 (L), o/w WNL (Creatinine: 1.96 (H))    Hepatic Panel: Bilirubin direct: 1.4 (H), Bilirubin total: 2.9 (H), Albumin: 2.7 (L), o/w WNL   1625 Troponin: 0.030   PTT: 66 (H)  INR: 7.45 (HH)  Blood type and screen:  O+    Occult blood stool: Negative  UA with Microscopic: Albumin: 10 (A), Leukocyte esterase: Small (A), WBC: 11 (H), Bacteria: Few (A), Hyaline casts: 3 (H), o/w WNL    Interventions:  1630 NS 1L IV   Percocet, 1 tablet, PO  1729 Fentanyl, 50 mcg, IV injection    Emergency Department Course:  Patient arrived via EMS. Paramedic notes and vitals reviewed.     (1611) I performed an exam of the patient as documented above.      IV inserted. Medicine administered as documented above. Blood drawn. This was sent to the lab for further testing, results above. EKG obtained in the ED, see results above.      The patient was sent for a right lower extremity ultrasound while in the emergency department, findings above.      The patient provided a urine sample here in the emergency department. This was sent for laboratory testing, findings above.     (1645) Lab called me to report the abnormally low hemoglobin level. I placed orders for a blood transfusion at this time.     Patient was consented for a blood transfusion and received 1 unit RBC in the emergency department.     (1733) I rechecked the patient and discussed the results of his workup  thus far.      (1856) I consulted with Dr. Phillips of the hospitalist services. They are in agreement to accept the patient for admission. He does request the patient be sent for a left hip CT scan in the emergency department, results above.     Findings and plan explained to the Patient who consents to admission. Discussed the patient with Dr. Phillips, who will admit the patient to a medical bed for further monitoring, evaluation, and treatment.    I personally reviewed the laboratory and imaging results with the Patient and answered all related questions prior to admission.        Impression & Plan      Medical Decision Making:  Antonio Ramirez is a 73 year old male with a history of atrial fibrillation on chronic anticoagulation who presents 10 days after a mechanical fall and subsequent injury to the left hip and left lower leg.  Patient does have a chronic left lower extremity BKA with a prosthesis, although he is unable to wear this due to swelling and pain in the left lower extremity. Ultrasound confirms no presence of DVT. Ultimately, patient does have an acute blood loss anemia with a hemoglobin of 6.3. He was consented for blood transfusion and transfused 1 unit of  RBCs here in the emergency department. His INR is elevated to 7.45 and this may be secondary both to use of chronic Xarelto and known liver dysfunction with alcoholic cirrhosis. Patient has some mild acute kidney injury as well with an elevated creatinine of 1.96, as well as hyperkalemia of 5.4 although no concerning EKG changes and reassuringly his troponin is undetectable; there is low concern for ACS as the cause of the patient's weakness presentation.  I spoke with Dr. Phillips of the hospitalist service who agreed to admission at this time for combination of acute kidney injury as well a acute blood loss anemia and suspected left buttock hematoma. We opted to order a CT scan of the hip with no contrast to evaluate for size of the suspected  soft tissue hematoma and whether any procedures would be necessary to better assist with the worsening hematoma and pain in the left buttock and hip.  CT results pending at time of admission. Patient ultimately agreed to admission and Dr. Phillips agreed to accept the patient.     Critical Care time:  none    Diagnosis:    ICD-10-CM    1. Acute renal failure, unspecified acute renal failure type (H) N17.9 Occult blood stool     Blood component     Hepatic panel     INR     Partial thromboplastin time   2. Leg hematoma, left, initial encounter S80.12XA    3. History of anticoagulant use Z92.29    4. History of atrial fibrillation Z86.79    5. Anemia due to blood loss, acute D62        Disposition:  Admitted to hospital under the care of Dr. Phillips    Scribe Disclosure:  I, Anuja Robbins, am serving as a scribe on 2/28/2019 at 4:11 PM to personally document services performed by Coy Coleman MD based on my observations and the provider's statements to me.     Anuja Robbins  2/28/2019    EMERGENCY DEPARTMENT       Coy Coleman MD  03/01/19 0221

## 2019-02-28 NOTE — PROGRESS NOTES
"Clinic Care Coordination Contact    Situation: Patient chart reviewed by care coordinator.    Background:   11/27/18 to 12/05/18 - hospitalization for right hip replacement.    12/05/18 to 12/31/18 - TCU (while in TCU, he had three visits to the ED: two related to ultrasound guided paracentesis and the other for altered mental status)    1/3/19 -  Home Care for physical therapy.    2/17/19: ED of [facility] for pain related to the contusion of pelvic region secondary to mechanical fall on 2/16/19. Patient was discharged home, but not satisfied with pain medication prescribed.    2/20/19: ED of [facility] for unresolved pain after contusion, per PT and PCP's advice.   Patient was discharged home with new medication prescription and recommendation to follow up with PCP on 2/21/19.  Per ED provider's note: \"I did discuss at bedside that we may need to get a CT scan of the left hip or pelvis to evaluate for potential occult fractures not well seen on x-ray imaging . Patient declined CT imaging at this time and did understand that we may be missing a small fracture not well seen on x-rays three days ago. Patient preferred to follow up closely with his primary care provider tomorrow although we did discuss that his primary doctor may end up sending him back to the emergency department if he were to have worsening pain. Patient understood this and understood the potential of missing a critical diagnosis today, such as hip fracture\"    2/27/19:   12:45 pm: [facility] PT advising patient should go to TCU.  1:39 pm: PCP advised patient to go to ED, to get to TCU.  1:46 pm: patient refused to go to ED. Patient called [facility] PT.  2:26 pm: PT informs clinic patient refused PT, RN, SW until he sees PCP on 2/28/19 at 2:30 pm. Transportation arranged by patient's wife.    Assessment: Patient wants to be the sole decision maker for what medication he should receive for pain; logistics for going to TCU; when he should see his PCP (or not).       Plan: " Patient is seeing Dr. Hardy at 2:30 pm today.  RN CCC will route this chart review to PCP and Clinic Triage RNs.  RN CCC will await for PCP's advice.      Diane Chaidez, RN Care Coordinator  [facility]  Phone:  [phone] (Mondays, Wednesdays & Fridays)  Phone:  [phone] (Tuesdays & Thursdays)  Email: [email or web address]      Addendum - 3/1/19    Patiens was admitted to [facility].  RN CCC will review patient's chart in a week.

## 2019-02-28 NOTE — PROGRESS NOTES
"This patient has had 3 recent ER visits re: hip pain and missed his PCP appt today because the ride didn't show up so he came to the ER.  This patient is looking for TCU placement.  The work up is in progress.  I explained to this patient and his wife at bedside that they will have to private pay for TCU and it will cost $5,000 up front.  This patient mentioned to the provider of possibly going [facility].  I did give this patients wife a TCU list to look at.  I explained to this patient and his wife that even if this patient stays overnite it will be private pay for the rehab tomorrow,  This patients wife said \"oh this patient is staying here over nite\".  I couldn't confirm or deny the over nite stay as this patients work up is still in progress.  I updated the ER provider.  This patient and his wife agreed to private pay for the TCU stay.  "

## 2019-02-28 NOTE — ED NOTES
DATE:  2/28/2019   TIME OF RECEIPT FROM LAB:  7609  LAB TEST:  INR  LAB VALUE:  7.45  RESULTS GIVEN WITH READ-BACK TO (PROVIDER): Dr. Coleman   TIME LAB VALUE REPORTED TO PROVIDER:   1800

## 2019-02-28 NOTE — ED NOTES
Bed: ED28  Expected date:   Expected time:   Means of arrival:   Comments:  antonio - 73 M weakness eta 1520

## 2019-02-28 NOTE — LETTER
[facility]   [address] [place] [address]  Phone: [phone]         To Whom It May Concern,     Antonio Ramirez ( 3/25/1944) has been diagnosed with bacterial bloodstream infection which has resulted in complications and required hospitalization.  He has been hospitalized at [facility] in [place], [place] since 2019 and under the care of myself and my colleagues.  As of today, he continues to remain hospitalized.  He will need a prolonged course of intravenous antibiotics (up to 6 weeks) as part of the treatment for this infection.  As such, he has been instructed that he will not be able to travel during this time.       Please do not hesitate to contact me should you have questions or concerns.       Sincerely,               Omaira Coleman,   Internal Medicine - Hospitalist  [facility]  3/10/2019

## 2019-02-28 NOTE — ED NOTES
"[facility]  ED Nurse Handoff Report    ED Chief complaint: Leg Pain (Patient states that he had a fall ten days ago and was seen here where he states he got a CT of his head and xrays of his pelvis. Wife noticed that he has been increasingly weak and is still having pain in his legs which has worsen over the last few days )      ED Diagnosis:   Final diagnoses:   None       Code Status: to be assessed by admitting provider     Allergies:   Allergies   Allergen Reactions     Ciprofloxacin Itching     Severe itching \"like ants were crawling\"     Hmg-Coa-R Inhibitors Other (See Comments)     Rhabdomyolysis occurred within a couple days       Activity level - Baseline/Home:  Total Care    Activity Level - Current:   Total Care     Needed?: No    Isolation: No  Infection: Not Applicable  Bariatric?: No    Vital Signs:   Vitals:    02/28/19 1536 02/28/19 1539 02/28/19 1600 02/28/19 1630   BP:  106/58 96/58 107/66   Pulse: 58   66   Temp: 99  F (37.2  C)      TempSrc: Oral      SpO2: 97%      Weight: 90.7 kg (200 lb)      Height: 1.854 m (6' 1\")          Cardiac Rhythm: ,        Pain level: 0-10 Pain Scale: 10    Is this patient confused?: No   Does this patient have a guardian?  No         If yes, is there guardianship documents in the Epic \"Code/ACP\" activity?  N/A         Guardian Notified?  N/A  [place] - Suicide Severity Rating Scale Completed?  Yes  If yes, what color did the patient score?  White    Patient Report: Initial Complaint: Patient arrived to the ED today via EMS for left leg/ buttock pain. Patient had a fall ten days ago and since this time has been bed bound. He states that he has been having increasing pain and wife states that she feels that he has been running a fever the last few days. Patient was found to have a hemoglobin of 6.3.   Focused Assessment: Respiratory: WDL  Cardiac: WDL   Neuro: WDL  : WDL   GI: Patient has been having trouble having bowel movements for " the last week +. Patient was placed on the commode as soon as he arrived and was able to have a moderate sized BM while he was here]  Musculoskeletal: patient had a fall about 10 days ago and has since been having worsening left leg pain. He has been bed bound for the last few weeks. Patient has skin break down on his lower left extremity and says he has pain in this extremity which varies from 3-10 based on his position   Tests Performed: Labs, EKG, UA, Ultrasound  Abnormal Results:    Labs Ordered and Resulted from Time of ED Arrival Up to the Time of Departure from the ED   CBC WITH PLATELETS DIFFERENTIAL - Abnormal; Notable for the following components:       Result Value    RBC Count 1.93 (*)     Hemoglobin 6.3 (*)     Hematocrit 19.5 (*)      (*)     RDW 17.2 (*)     All other components within normal limits   BASIC METABOLIC PANEL - Abnormal; Notable for the following components:    Sodium 131 (*)     Potassium 5.4 (*)     Glucose 111 (*)     Urea Nitrogen 65 (*)     Creatinine 1.96 (*)     GFR Estimate 32 (*)     GFR Estimate If Black 37 (*)     All other components within normal limits   ROUTINE UA WITH MICROSCOPIC - Abnormal; Notable for the following components:    Protein Albumin Urine 10 (*)     Leukocyte Esterase Urine Small (*)     WBC Urine 11 (*)     Bacteria Urine Few (*)     Hyaline Casts 3 (*)     All other components within normal limits   TROPONIN I   OCCULT BLOOD STOOL   CARDIAC CONTINUOUS MONITORING   ABO/RH TYPE AND SCREEN   RED BLOOD CELL PREPARE ORDER UNIT         Treatments provided: Blood, Fluids, Pain medication     Family Comments: Wife at bedside     OBS brochure/video discussed/provided to patient/family: N/A              Name of person given brochure if not patient: n/a              Relationship to patient: n/a    ED Medications:   Medications   0.9% sodium chloride BOLUS (1,000 mLs Intravenous New Bag 2/28/19 1630)   0.9% sodium chloride BOLUS (not administered)    oxyCODONE-acetaminophen (PERCOCET) 5-325 MG per tablet 1 tablet (1 tablet Oral Given 2/28/19 1630)       Drips infusing?:  Yes    For the majority of the shift this patient was Green.   Interventions performed were none.    Severe Sepsis OR Septic Shock Diagnosis Present: No    To be done/followed up on inpatient unit:  Continue to monitor     ED NURSE PHONE NUMBER: *[phone]

## 2019-03-01 ENCOUNTER — APPOINTMENT (OUTPATIENT)
Dept: GENERAL RADIOLOGY | Facility: CLINIC | Age: 74
DRG: 605 | End: 2019-03-01
Attending: SURGERY
Payer: MEDICARE

## 2019-03-01 ENCOUNTER — APPOINTMENT (OUTPATIENT)
Dept: CARDIOLOGY | Facility: CLINIC | Age: 74
DRG: 605 | End: 2019-03-01
Attending: HOSPITALIST
Payer: MEDICARE

## 2019-03-01 LAB
ANION GAP SERPL CALCULATED.3IONS-SCNC: 6 MMOL/L (ref 3–14)
BUN SERPL-MCNC: 61 MG/DL (ref 7–30)
CALCIUM SERPL-MCNC: 8 MG/DL (ref 8.5–10.1)
CHLORIDE SERPL-SCNC: 103 MMOL/L (ref 94–109)
CK SERPL-CCNC: 69 U/L (ref 30–300)
CO2 SERPL-SCNC: 23 MMOL/L (ref 20–32)
CREAT SERPL-MCNC: 1.84 MG/DL (ref 0.66–1.25)
CRP SERPL-MCNC: 34.3 MG/L (ref 0–8)
ERYTHROCYTE [DISTWIDTH] IN BLOOD BY AUTOMATED COUNT: 17.4 % (ref 10–15)
GFR SERPL CREATININE-BSD FRML MDRD: 35 ML/MIN/{1.73_M2}
GLUCOSE SERPL-MCNC: 123 MG/DL (ref 70–99)
HCT VFR BLD AUTO: 22 % (ref 40–53)
HGB BLD-MCNC: 7.1 G/DL (ref 13.3–17.7)
INR PPP: 3.85 (ref 0.86–1.14)
MCH RBC QN AUTO: 32.4 PG (ref 26.5–33)
MCHC RBC AUTO-ENTMCNC: 32.3 G/DL (ref 31.5–36.5)
MCV RBC AUTO: 101 FL (ref 78–100)
PLATELET # BLD AUTO: 229 10E9/L (ref 150–450)
POTASSIUM SERPL-SCNC: 5 MMOL/L (ref 3.4–5.3)
RBC # BLD AUTO: 2.19 10E12/L (ref 4.4–5.9)
SODIUM SERPL-SCNC: 132 MMOL/L (ref 133–144)
WBC # BLD AUTO: 6.5 10E9/L (ref 4–11)

## 2019-03-01 PROCEDURE — 99221 1ST HOSP IP/OBS SF/LOW 40: CPT | Performed by: SURGERY

## 2019-03-01 PROCEDURE — 25500064 ZZH RX 255 OP 636: Performed by: INTERNAL MEDICINE

## 2019-03-01 PROCEDURE — 36415 COLL VENOUS BLD VENIPUNCTURE: CPT | Performed by: HOSPITALIST

## 2019-03-01 PROCEDURE — 86140 C-REACTIVE PROTEIN: CPT | Performed by: HOSPITALIST

## 2019-03-01 PROCEDURE — 25000125 ZZHC RX 250: Performed by: HOSPITALIST

## 2019-03-01 PROCEDURE — 82550 ASSAY OF CK (CPK): CPT | Performed by: HOSPITALIST

## 2019-03-01 PROCEDURE — A9270 NON-COVERED ITEM OR SERVICE: HCPCS | Mod: GY | Performed by: INTERNAL MEDICINE

## 2019-03-01 PROCEDURE — A9270 NON-COVERED ITEM OR SERVICE: HCPCS | Mod: GY | Performed by: HOSPITALIST

## 2019-03-01 PROCEDURE — 36415 COLL VENOUS BLD VENIPUNCTURE: CPT | Performed by: INTERNAL MEDICINE

## 2019-03-01 PROCEDURE — 40000901 ZZH STATISTIC WOC PT EDUCATION, 0-15 MIN

## 2019-03-01 PROCEDURE — 12000000 ZZH R&B MED SURG/OB

## 2019-03-01 PROCEDURE — 93306 TTE W/DOPPLER COMPLETE: CPT | Mod: 26 | Performed by: INTERNAL MEDICINE

## 2019-03-01 PROCEDURE — 85610 PROTHROMBIN TIME: CPT | Performed by: INTERNAL MEDICINE

## 2019-03-01 PROCEDURE — G0463 HOSPITAL OUTPT CLINIC VISIT: HCPCS

## 2019-03-01 PROCEDURE — 99233 SBSQ HOSP IP/OBS HIGH 50: CPT | Performed by: HOSPITALIST

## 2019-03-01 PROCEDURE — 40000264 ECHOCARDIOGRAM COMPLETE

## 2019-03-01 PROCEDURE — 72100 X-RAY EXAM L-S SPINE 2/3 VWS: CPT

## 2019-03-01 PROCEDURE — 25000132 ZZH RX MED GY IP 250 OP 250 PS 637: Mod: GY | Performed by: HOSPITALIST

## 2019-03-01 PROCEDURE — 80048 BASIC METABOLIC PNL TOTAL CA: CPT | Performed by: INTERNAL MEDICINE

## 2019-03-01 PROCEDURE — 85027 COMPLETE CBC AUTOMATED: CPT | Performed by: INTERNAL MEDICINE

## 2019-03-01 PROCEDURE — 25800030 ZZH RX IP 258 OP 636: Performed by: HOSPITALIST

## 2019-03-01 PROCEDURE — 25000128 H RX IP 250 OP 636: Performed by: HOSPITALIST

## 2019-03-01 PROCEDURE — 25000132 ZZH RX MED GY IP 250 OP 250 PS 637: Mod: GY | Performed by: INTERNAL MEDICINE

## 2019-03-01 PROCEDURE — A9270 NON-COVERED ITEM OR SERVICE: HCPCS | Performed by: HOSPITALIST

## 2019-03-01 RX ORDER — GABAPENTIN 600 MG/1
300 TABLET ORAL 2 TIMES DAILY
Status: DISCONTINUED | OUTPATIENT
Start: 2019-03-01 | End: 2019-03-01

## 2019-03-01 RX ORDER — MULTIVITAMIN,THERAPEUTIC
1 TABLET ORAL DAILY
Status: DISCONTINUED | OUTPATIENT
Start: 2019-03-01 | End: 2019-03-02

## 2019-03-01 RX ORDER — FOLIC ACID 1 MG/1
1 TABLET ORAL DAILY
Status: DISCONTINUED | OUTPATIENT
Start: 2019-03-01 | End: 2019-03-14 | Stop reason: HOSPADM

## 2019-03-01 RX ORDER — POLYETHYLENE GLYCOL 3350 17 G/17G
17 POWDER, FOR SOLUTION ORAL 2 TIMES DAILY PRN
Status: DISCONTINUED | OUTPATIENT
Start: 2019-03-01 | End: 2019-03-14 | Stop reason: HOSPADM

## 2019-03-01 RX ORDER — BISACODYL 10 MG
10 SUPPOSITORY, RECTAL RECTAL DAILY PRN
Status: DISCONTINUED | OUTPATIENT
Start: 2019-03-01 | End: 2019-03-14 | Stop reason: HOSPADM

## 2019-03-01 RX ORDER — SODIUM CHLORIDE 9 MG/ML
INJECTION, SOLUTION INTRAVENOUS CONTINUOUS
Status: DISCONTINUED | OUTPATIENT
Start: 2019-03-01 | End: 2019-03-02

## 2019-03-01 RX ORDER — GABAPENTIN 300 MG/1
300 CAPSULE ORAL 2 TIMES DAILY
Status: DISCONTINUED | OUTPATIENT
Start: 2019-03-01 | End: 2019-03-07

## 2019-03-01 RX ORDER — LANOLIN ALCOHOL/MO/W.PET/CERES
100 CREAM (GRAM) TOPICAL DAILY
Status: DISCONTINUED | OUTPATIENT
Start: 2019-03-02 | End: 2019-03-14 | Stop reason: HOSPADM

## 2019-03-01 RX ADMIN — PHYTONADIONE 5 MG: 10 INJECTION, EMULSION INTRAMUSCULAR; INTRAVENOUS; SUBCUTANEOUS at 18:00

## 2019-03-01 RX ADMIN — GABAPENTIN 300 MG: 300 CAPSULE ORAL at 20:30

## 2019-03-01 RX ADMIN — OXYCODONE HYDROCHLORIDE 5 MG: 5 TABLET ORAL at 20:30

## 2019-03-01 RX ADMIN — SODIUM CHLORIDE: 9 INJECTION, SOLUTION INTRAVENOUS at 16:31

## 2019-03-01 RX ADMIN — TORSEMIDE 20 MG: 10 TABLET ORAL at 08:17

## 2019-03-01 RX ADMIN — OXYCODONE HYDROCHLORIDE 5 MG: 5 TABLET ORAL at 05:16

## 2019-03-01 RX ADMIN — Medication 1 MG: at 20:31

## 2019-03-01 RX ADMIN — HUMAN ALBUMIN MICROSPHERES AND PERFLUTREN 3 ML: 10; .22 INJECTION, SOLUTION INTRAVENOUS at 15:21

## 2019-03-01 RX ADMIN — FOLIC ACID 1 MG: 1 TABLET ORAL at 11:14

## 2019-03-01 RX ADMIN — Medication 1000 MCG: at 14:01

## 2019-03-01 RX ADMIN — RANITIDINE 150 MG: 150 TABLET ORAL at 20:30

## 2019-03-01 RX ADMIN — THERA TABS 1 TABLET: TAB at 11:14

## 2019-03-01 RX ADMIN — Medication 100 MG: at 08:17

## 2019-03-01 RX ADMIN — RANITIDINE 150 MG: 150 TABLET ORAL at 11:14

## 2019-03-01 RX ADMIN — GABAPENTIN 600 MG: 600 TABLET, FILM COATED ORAL at 08:17

## 2019-03-01 ASSESSMENT — ACTIVITIES OF DAILY LIVING (ADL)
ADLS_ACUITY_SCORE: 25
ADLS_ACUITY_SCORE: 26

## 2019-03-01 NOTE — PROGRESS NOTES
[facility] Nurse Inpatient Wound Assessment      Assessment of wound(s) on pt's:   Lt posterior stump and coccyx/buttock        Data:   Patient History:     Antonio Ramirez is a 73 year old male who was seen in consultation at the request of Dr. Peters who presented with a left gluteal hematoma. He has a PMH s/f afib on xarelto, CAD, CKD, PVD with LLE BKA and alcoholic cirrhosis. He reports he fell at home 10 days ago. He had two vodka drinks and lost his balance. Denies LOC. He was seen here and had pelvic XR and head CT which was negative.  His primary concern is low back pain in the midline.  He had been having more severe pain in the left buttock last night when he presented.         Ottoniel Risk Assessment  Sensory Perception: 4-->no impairment    Moisture: 3-->occasionally moist   Activity: 2-->chairfast     Mobility: 3-->slightly limited   Nutrition: 3-->adequate   Ottoniel Score: 17      Positioning: Rt/Lt positioning with pillows    Mattress:  Atmos air      Moisture Management:  Incontinence protocol      Current Diet / Nutrition:     Combination Diet Regular Diet Adult      Labs:   Recent Labs   Lab Test 03/01/19  1210 02/28/19  1625  05/18/18  1641   ALBUMIN  --  2.7*   < >  --    HGB 7.1* 6.3*   < > 10.6*   INR 3.85* 7.45*   < >  --    WBC 6.5 6.8   < > 4.4   A1C  --   --   --  4.6   CRP 34.3*  --   --   --     < > = values in this interval not displayed.       Wound Assessment (location):   Lt posterior stump        Wound History:    LLE stump: 3-4+ edema with scattered ecchymosis over entire stump.   Lt posterior stump:     Specific Dimensions (length x width x depth, in cm) :   3.0cm x 2.5cm x .1cm    Tunneling:  N/A    Undermining: N/A    Palpation of the wound bed: firm edema    Slough appearance:  N/A    Eschar appearance:  N/A    Periwound Skin: scattered small open superficial wounds probably representing rupt bulla      Color: {scattered ecchymosis    Temperature   warm    Drainage:  serous    Odor: None    Pain:  None    Lt buttock, coccyx , Lt hip and Lt posterior thigh   l-eft buttock:  Hematoma present, nontender, skin intact  Coccyx/ Lt hip and Lt posterior thigh: skin intact with scattered ecchymosis, not a PI, probably r/t fall                Intervention:     Patient's chart evaluated.      Wound(s) assessed    Wound Care: cleaned MicroKlenz                           Mepilex sacral                           No dressing to Lt buttock/back hematoma/ecchymosis    Orders  In Epic    Supplies In supply room    Discussed plan of care with Nursing and patient            Assessment:   Lt posterior stump:  Superficial wounds r/t edema and ecchymosis.  Coccyx and Lt buttock: ecchymosis and hematoma r/t fall, not pressure        Plan:     Nursing to notify the Provider(s) and re-consult the WOC Nurse if wound(s) deteriorate(s) or if the wound care plan needs reevaluation.             Wound care: Lt posterior stump or any weeping areas on BLE         1. Change dressing odd days and prn         2.  Clean legs with MicroKlenz         3.  Apply sheet of Aqua cell Ag over wounds         4. Secure with Mepilex sacral         5.  Elevate Leg on pillow @ all times in bed         6.  Coccyx: Mepilex sacral for prevention.          7   No dressing required to hematoma Lt buttock, low back or Lt hip.              WOC will return weekly

## 2019-03-01 NOTE — PHARMACY-ADMISSION MEDICATION HISTORY
Admission medication history interview status for the 2/28/2019  admission is complete. See EPIC admission navigator for prior to admission medications     Medication history source reliability:Good    Actions taken by pharmacist (provider contacted, etc): Completed chart review, utilized SureScripts, performed  check, and discussed medications with patient.     Additional medication history information not noted on PTA med list :None    Medication reconciliation/reorder completed by provider prior to medication history? No    Time spent in this activity: 10 minutes    Prior to Admission medications    Medication Sig Last Dose Taking? Auth Provider   aspirin 81 MG tablet Take 81 mg by mouth daily 2/27/2019 at am Yes Reported, Patient   Camphor-Menthol (MENTHOLATUM EX) Externally apply topically nightly as needed prn Yes Unknown, Entered By History   carvedilol (COREG) 3.125 MG tablet TAKE 1 TABLET(3.125 MG) BY MOUTH TWICE DAILY WITH MEALS 2/28/2019 at am Yes Main Hardy MD   fluticasone (FLONASE) 50 MCG/ACT spray Spray 1 spray into both nostrils daily as needed  prn Yes Reported, Patient   gabapentin (NEURONTIN) 600 MG tablet Take 1 tablet (600 mg) by mouth 2 times daily 2/28/2019 at am Yes Oksana Najera APRN CNP   ipratropium (ATROVENT) 0.06 % nasal spray Spray 2 sprays into both nostrils 4 times daily prn Yes Unknown, Entered By History   torsemide (DEMADEX) 20 MG tablet Take 20 mg by mouth daily 2/27/2019 at Unknown time Yes Reported, Patient   XARELTO 20 MG TABS tablet TAKE 1 TABLET(20 MG) BY MOUTH DAILY 2/28/2019 at am Yes Main Hardy MD

## 2019-03-01 NOTE — PROGRESS NOTES
[facility]  Patient is currently open to home care services with [facility]. The patient is currently receiving PT services. [facility]  and team have been notified of patient admission. [facility] liaison will continue to follow patient during stay.  If appropriate provide orders to resume home care at time of discharge.

## 2019-03-01 NOTE — PLAN OF CARE
"Pt A&Ox4, VSS on RA, has oxycodone for pain q4h prn. IVF running 100ml/hr. Turn & Repo Q2H, turns well, L BKA, skin on LLE is bruised and excoriated from fall. Coccyx is blanchable and reddened, pt reports a \"problem area on [his] lower spine\". Hx of afib - on xarelto. Liver issues, refuses tylenol stating \"acetaminophen doesn't agree with me.\" Reg diet. Vitamin K was replaced in ED, recheck in AM. US done, no DVTs found. Discharge TBD.   "

## 2019-03-01 NOTE — PROGRESS NOTES
.Admission    Patient arrives to room 605-1 via cart from ED .  Care plan note: Started     Inpatient nursing criteria listed below were met:    PCD's Documented: No  Skin issues/needs documented :Yes  Isolation education started/completed NA  Patient allergies verified with patient: No  Verified completion of [place] Risk Assessment Tool:  Yes  Verified completion of Guardianship screening tool: Yes  Fall Prevention: Care plan updated, Education given and documented Yes  Care Plan initiated: Yes  Home medications documented in belongings flowsheet: NA  Patient belongings documented in belongings flowsheet: Yes  Reminder note (belongings/ medications) placed in discharge instructions:NA  Admission profile/ required documentation complete: No

## 2019-03-01 NOTE — CONSULTS
[facility] General Surgery Consultation    Antonio Ramirez MRN# [id number]   YOB: 1945 Age: 73 year old      Date of Admission:  2/28/2019  Date of Consult: 3/1/2019         Assessment and Plan:   Patient is a 73 year old male who presented after a fall 10 days ago with a gluteal hematoma and left lower extremity edema. The skin across his lower back and buttock is intact. There is no evidence of ongoing bleeding. Compartments are soft. AM labs are pending. I would recommend reversal of his INR as we are doing and supportive cares. No need for surgical intervention. His primary concern is low back pain. He has not had dedicated spine imaging after his fall - recommend lumbar XR and possibly lumbar CT pending findings. Consider vascular medicine consult given new lymphedema in pt with significant PVD.     PLAN:  Lumbar spine imaging (ordered)  ?vascular medicine consultation given extensive PVD and new LLE edema with no DVT noted on US.  Reversal of INR  Supportive cares         Requesting Physician:      Dr. Peters        Chief Complaint:     Chief Complaint   Patient presents with     Leg Pain     Patient states that he had a fall ten days ago and was seen here where he states he got a CT of his head and xrays of his pelvis. Wife noticed that he has been increasingly weak and is still having pain in his legs which has worsen over the last few days           History of Present Illness:   Antonio Ramirez is a 73 year old male who was seen in consultation at the request of Dr. Peters who presented with a left gluteal hematoma. He has a PMH s/f afib on xarelto, CAD, CKD, PVD with LLE BKA and alcoholic cirrhosis. He reports he fell at home 10 days ago. He had two vodka drinks and lost his balance. Denies LOC. He was seen here and had pelvic XR and head CT which was negative.  His primary concern is low back pain in the midline.  He had been having more severe pain in the left buttock  "last night when he presented.           Physical Exam:   Blood pressure 99/60, pulse 51, temperature 97.8  F (36.6  C), temperature source Oral, resp. rate 20, height 1.854 m (6' 1\"), weight 90.7 kg (200 lb), SpO2 95 %.  200 lbs 0 oz  General: sitting comfortably in bed, no distress  Psych: Alert and Oriented.  Normal affect  Neurological: grossly intact  Eyes: Sclera clear  Respiratory:  nonlabored breathing  Cardiovascular:  Regular Rate and Rhythm   GI: soft, nt, nd  Back: tenderness to midline palpation at L1-L4, scattered areas of ecchymosis laterally on left, nontender here  Buttock: hematoma present on left buttock without overlying ecchymosis, nontender, skin intact  Extremities: LLE with BKA present, significant edema on left leg compared to right.   Lymphatic/Hematologic/Immune:  No femoral or cervical lymphadenopathy.  Integumentary:  No rashes         Past Medical History:     Past Medical History:   Diagnosis Date     Alcoholism (H)      Amputated left leg (H)      Anemia      Anticardiolipin antibody syndrome (H)      Antiphospholipid antibody syndrome (H)      Antiplatelet or antithrombotic long-term use      Aortic root dilatation (H)      Aortic stenosis      Arthritis      Balance problem      Coronary artery disease     CABG 2007: SVG to LAD, SVG to diagonal, SVG to OM; cardiac cath 5/17/18: thrombectomy for restenosis of stents-sent for urgent CABG, cath 5/14/2007: GRECIA x2 to LAD, [place] to diagonal     Gastro-oesophageal reflux disease      Heart attack (H) 2007    apparently arrested, cardiac X5     Hiatal hernia      Hypercholesterolemia      Hypertension      Ischemic cardiomyopathy      Left foot drop      Liver disease     alcoholic liver disease, alcoholic cirrohsosis, portal hypertension     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)     ?When diagnosed, patient not aware of this     Moderate mitral regurgitation      Monoclonal gammopathy      Neuropathy      Noninfectious ileitis     " diverticulitis of colon     Nonrheumatic tricuspid valve regurgitation      Paroxysmal atrial fibrillation (H)      PE (pulmonary embolism) 2006    saddle emboli 2006     Prostate cancer (H) 2000    Treated with radiation (seed implants in 2000) -- no problems since     Pulmonary hypertension (H)      Renal disease     kidney stones     Syncope      Thrombocytopenia (H)      Urethral stricture      Venous thrombosis     IVC filter and lysis procedures 2007            Past Surgical History:     Past Surgical History:   Procedure Laterality Date     AMPUTATE LEG BELOW KNEE Left 04/2014    related to gangrene, started as foot ulcer related to neuropathy     AMPUTATE LEG BELOW KNEE Left 12/4/2017    Procedure: AMPUTATE LEG BELOW KNEE;  Exploration of Left Leg Below Knee Amputation Stump with Ligation of Bleeders.;  Surgeon: Leandro Arreola MD;  Location:  OR     APPENDECTOMY       APPLY WOUND VAC Left 12/6/2017    Procedure: APPLY WOUND VAC;;  Surgeon: Saima Howard MD;  Location:  [facility]     ARTHROPLASTY HIP Right 11/27/2018    Procedure: RIGHT TOTAL HIP ARTHROPLASTY;  Surgeon: Saima Howard MD;  Location:  OR     ARTHROPLASTY KNEE Right 9/19/2014    Procedure: ARTHROPLASTY KNEE;  Surgeon: Saima Howard MD;  Location:  OR     CARDIAC SURGERY      CABG     CHOLECYSTECTOMY       COLONOSCOPY       COMBINED CYSTOSCOPY, RETROGRADES, EXCHANGE STENT URETER(S) Left 7/24/2018    Procedure: COMBINED CYSTOSCOPY, RETROGRADES, EXCHANGE STENT URETER(S);  CYSTOSCOPY, BILATERAL RETROGRADES, BILATERAL URETERAL STENT EXCHANGE ;  Surgeon: Matt Cervantes MD;  Location:  OR     CRANIOTOMY Right 4/7/2018    Procedure: CRANIOTOMY;  Right Craniotomy For Subdural Hematoma;  Surgeon: Jono Issa MD;  Location:  OR     CYSTOSCOPY, DILATE URETHRA, COMBINED N/A 10/12/2016    Procedure: COMBINED CYSTOSCOPY, DILATE URETHRA;  Surgeon: Dimitry Bello MD;  Location:  OR     CYSTOSCOPY, REMOVE  STENT(S), COMBINED Right 7/24/2018    Procedure: COMBINED CYSTOSCOPY, REMOVE STENT(S);;  Surgeon: Jose Hunter MD;  Location:  OR     ENDOSCOPIC STRIPPING VEIN(S)       esophagogastroduodenoscopy       EYE SURGERY      cataracts     GENITOURINARY SURGERY      Prostate Seen Implants     HERNIA REPAIR      Umbilical     INCISION AND DRAINAGE LOWER EXTREMITY, COMBINED Left 12/1/2017    Procedure: COMBINED INCISION AND DRAINAGE LOWER EXTREMITY;  INCISION AND DRAINAGE OF LEFT BELOW KNEE AMPUTATION ABSCESS, WOUND VAC PLACEMENT;  Surgeon: Saima Howard MD;  Location:  OR     IR IVC FILTER PLACEMENT       IRRIGATION AND DEBRIDEMENT LOWER EXTREMITY, COMBINED Left 12/6/2017    Procedure: COMBINED IRRIGATION AND DEBRIDEMENT LOWER EXTREMITY;  IRRIGATION AND DEBRIDEMENT OF LEFT BELOW KNEE AMPUTATION SITE WITH WOUND VAC REPLACEMENT;  Surgeon: Saima Howard MD;  Location: [facility]     IRRIGATION AND DEBRIDEMENT LOWER EXTREMITY, COMBINED Left 12/8/2017    Procedure: COMBINED IRRIGATION AND DEBRIDEMENT LOWER EXTREMITY;  IRRIGATION AND DEBRIDEMENT OF LEFT BELOW THE KNEE AMPUTATION AND SHORTENING OF THE TIBIA WITH WOUND CLOSURE;  Surgeon: Saima Howard MD;  Location:  OR     LASER HOLMIUM LITHOTRIPSY URETER(S), INSERT STENT, COMBINED Bilateral 6/28/2018    Procedure: COMBINED CYSTOSCOPY, URETEROSCOPY, LASER HOLMIUM LITHOTRIPSY URETER(S), INSERT STENT;  CYSTOSCOPY, BILATERAL URETEROSCOPY,  HOLMIUM LASER LITHOTRIPSY, BILATERAL STENT PLACEMENT, STONE BASKETING;  Surgeon: Jose Hunter MD;  Location:  OR     LASER HOLMIUM LITHOTRIPSY URETER(S), INSERT STENT, COMBINED Left 8/14/2018    Procedure: COMBINED CYSTOSCOPY, URETEROSCOPY, LASER HOLMIUM LITHOTRIPSY URETER(S), INSERT STENT;  CYSTOSCOPY, LEFT URETEROSCOPY, LEFT LASER HOLMIUM LITHOTRIPSY URETER(S) REMOVAL BILATERAL STENTS;  Surgeon: Jose Hunter MD;  Location:  OR     ORTHOPEDIC SURGERY      (R) knee scope     ORTHOPEDIC SURGERY      total hip  "     ORTHOPEDIC SURGERY      elbow surgery            Current Medications:           carvedilol  3.125 mg Oral BID w/meals     cyanocobalamin  1,000 mcg Sublingual Daily     folic acid  1 mg Oral Daily     gabapentin  600 mg Oral BID     multivitamin, therapeutic  1 tablet Oral Daily     ranitidine  150 mg Oral BID     torsemide  20 mg Oral Daily     [START ON 3/2/2019] vitamin B1  100 mg Oral Daily       acetaminophen, bisacodyl, ipratropium, LORazepam, melatonin, naloxone, ondansetron **OR** ondansetron, oxyCODONE, polyethylene glycol, senna-docusate **OR** senna-docusate         Home Medications:     Prior to Admission medications    Medication Sig Last Dose Taking? Auth Provider   aspirin 81 MG tablet Take 81 mg by mouth daily 2/27/2019 at am Yes Reported, Patient   Camphor-Menthol (MENTHOLATUM EX) Externally apply topically nightly as needed prn Yes Unknown, Entered By History   carvedilol (COREG) 3.125 MG tablet TAKE 1 TABLET(3.125 MG) BY MOUTH TWICE DAILY WITH MEALS 2/28/2019 at am Yes Main Hardy MD   fluticasone (FLONASE) 50 MCG/ACT spray Spray 1 spray into both nostrils daily as needed  prn Yes Reported, Patient   gabapentin (NEURONTIN) 600 MG tablet Take 1 tablet (600 mg) by mouth 2 times daily 2/28/2019 at am Yes Oksana Najera APRN CNP   ipratropium (ATROVENT) 0.06 % nasal spray Spray 2 sprays into both nostrils 4 times daily prn Yes Unknown, Entered By History   torsemide (DEMADEX) 20 MG tablet Take 20 mg by mouth daily 2/27/2019 at Unknown time Yes Reported, Patient   XARELTO 20 MG TABS tablet TAKE 1 TABLET(20 MG) BY MOUTH DAILY 2/28/2019 at am Yes Main Hardy MD            Allergies:     Allergies   Allergen Reactions     Ciprofloxacin Itching     Severe itching \"like ants were crawling\"     Hmg-Coa-R Inhibitors Other (See Comments)     Rhabdomyolysis occurred within a couple days            Family History:     Family History   Problem Relation Age of Onset     Lung Cancer Mother "      Heart Failure Father          age 87           Social History:   Antonio Ramirez  reports that  has never smoked. he has never used smokeless tobacco. He reports that he drinks alcohol. He reports that he does not use drugs.          Review of Systems:   The 10 point Review of Systems is negative other than noted in the HPI.         Labs/Imaging   All new lab and imaging data was reviewed.   I have personally reviewed the imaging studies        Juli Wiggins MD

## 2019-03-01 NOTE — PROGRESS NOTES
[facility]  Hospitalist Progress Note   03/01/2019          Assessment and Plan:       Antonio Ramirez is a 73 year old male with history of chronic anemia, alcoholic liver cirrhosis, heart failure with preserved ejection fraction, CKD admitted on 2/28/2019 with buttock pain.    Left leg hematoma in the setting of anticoagulation  Fall at home likely mechanical in the setting of alcohol abuse  Supratherapeutic INR in the setting of anticoagulation/alcohol use status post reversal.  Physical deconditioning due to acute illness/chronic debility.  Patient brought into the ED via EMS for buttock pain, had a fall 10 days ago.  Has had third visit to the ED for weakness related to this. Has mostly been bedbound since the last 10 days and would [facility].  Patient admits to been drinking around 4-5 drinks of vodka a day.  Last drink on night prior to admission  On exam bruising left buttock area, left thigh consistent with extremity contusion/hematoma.     Ultrasound lower extremity venous duplex left showed no evidence of DVT.  CT left hip showed multiple intramuscular hematomas throughout the left lower extremity.  Presented with INR of 7.45, had vitamin K for reversal and this morning INR 3.85.    Surgery consult requested for left leg hematoma.  Wound care consult requested for skin tears on hematoma.  PRN oxycodone for mild to moderate pain.  IV Dilaudid for severe pain.    Prn warm compress. Fall precautions.  Closely monitor INR, hold PTA Xarelto and aspirin for today.  Did discuss with patient risk of stroke/DVT while off anticoagulation.  Will check CPK levels.  PT, OT assessment for rehabilitation.   assistance with transition of care.    #Acute on chronic anemia likely from hematoma/dilutional with macrocytosis  Baseline hemoglobin between 7.5-8.5.  Presented with a hemoglobin of 8.3, received 1 unit blood transfusion with which hemoglobin this morning at  7.1.  We will transfuse if hemoglobin less than 7 or symptomatic.  We will hold off anemia workup at this time as patient has already received blood transfusion.  We will start on vitamin B12 supplements given macrocytosis/alcohol use      # History of alcohol abuse  # Alcoholic liver cirrhosis with ascites, portal hypertension.  Drinks approx 1/3rd bottle of vodka a day at home  No withdrawal symptoms on this past hospital admission  CIWA scoring with PRN IV/p.o. Ativan.  Started on thiamine, multivitamin, folic acid supplements.  We will monitor electrolytes including potassium and magnesium closely.  Advised patient to abstain from alcohol.  Will need psychiatry, , chemical dependency evaluation once medically stable.    # Paroxysmal atrial fibrillation on chronic anticoagulation  Patient denies any chest pain or palpitation.  Continue PTA Coreg.  PTA Xarelto/aspirin on hold given hematoma/supratherapeutic INR, will need to have further discussions on continuing anticoagulation given patient's recurrent falls, presenting with hematoma/previous history of subdural hematoma.  Continue telemetry monitoring.    # Antiphospholipid Antibody Syndrome (AAS), IVC filter in place, on chronic anticoagulation:  History of Pulmonary Embolism, Deep Vein Thrombosis:   PTA Xarelto, aspirin on hold today given supratherapeutic INR, will need to revisit discussion on risks and benefits of anticoagulation in patient with recurrent falls.     #History of HFrEF, EF 40-45%.  #History of cardiomyopathy.  # Mild valvular aortic stenosis, moderate mitral regurgitation, mild to moderate tricuspid regurgitation, mild ascending aorta dilatation.  # Coronary artery disease s/p 3-v CABG.  # Thoracic aortic aneurysm without rupture.  Troponin on admission 0.030. EKG showed atrial fibrillation with slow ventricular response, heart rate 57, .  Nonspecific ST-T wave changes.  Patient does not have specific complaint of chest  discomfort, has been having frequent falls and is very deconditioned and barely able to move for last 10 days.  We will start on telemetry monitoring.  Echocardiogram for evaluation of heart function/valvular abnormalities.  Continue PTA Coreg.  Hold PTA torsemide given acute on chronic kidney injury.  Holding PTA ASA, warfarin as above  Not on statin PTA due to intolerance       Acute on chronic kidney injury likely prerenal.  # Chronic kidney disease, stage 3.  Baseline creatinine between 1.2-1.4.   History of ANSELMO requiring HD in 2010  Presented with creatinine of 1.96 -hold PTA torsemide.  Gentle hydration with NS at 50 mL/h.  Loosely monitor volume status.  Avoid nephrotoxic drugs, monitor renal function a.m.    History of for subdural hematoma status post craniotomy 7/2018  Patient had a mechanical fall in the setting of anticoagulation, had subdural hematoma and had craniotomy and evacuation.  Then anticoagulation was on hold for a few weeks and later restarted.    # Hyperlipidemia.  Does not tolerate statin therapy, not on PTA medication for above diagnosis;     # Essential hypertension.  Continue PTA Coreg.  PTA torsemide on hold.    # Gastroesophageal reflux disease.  Not on any PTA medications for above diagnosis, .  Ranitidine 150 mg twice daily started.      # Low grade B cell lymphoproliferative disorder.  History of prostate cancer s/p radiative seed implants.  - No acute issues,       # Peripheral polyneuropathy.  PTA Gabapentin 600mg PO BID reduced to 300 mg twice daily given renal injury.    Pressure injury coccyx.  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  Stage I pressure injury to the sacrum  Wound care team consulted.  Appreciate recommendation.    Orders Placed This Encounter      Combination Diet Regular Diet Adult      DVT Prophylaxis: SCD, supra therapeutic INR  Code Status: Full Code  Disposition: Expected discharge > 2 days     Patient, interdisciplinary team involved in care and agrees with  plan.  Total time - Greater than 35 min. More than 50% of time spent in direct patient care, care coordination, patient counseling, and formalizing plan of care.     Rosalie Peters MD        Interval History:      Patient lying in bed.  Denies any chest pain or shortness of breath.  No nausea vomiting.  Headache or dizziness.  Having pain in the left leg, pain improved with oxycodone.  Tolerating oral diet.  Has not ambulated out of bed using urinal.  , No new tingling or numbness noted.       Physical Exam:        Physical Exam   Temp:  [97.5  F (36.4  C)-99  F (37.2  C)] 97.8  F (36.6  C)  Pulse:  [51-66] 51  Heart Rate:  [52-63] 54  Resp:  [13-16] 16  BP: ()/(53-66) 99/60  SpO2:  [95 %-99 %] 95 %    Intake/Output Summary (Last 24 hours) at 3/1/2019 0811  Last data filed at 3/1/2019 0500  Gross per 24 hour   Intake --   Output 400 ml   Net -400 ml       Admission Weight: 90.7 kg (200 lb)  Current Weight: 90.7 kg (200 lb)    PHYSICAL EXAM  GENERAL: Patient is in no distress. Alert and oriented.  HEART: Regular rate and rhythm. S1S2.  Colic murmur right sternal border.  LUNGS little decreased breath sounds, no wheezing or crackles.  ABDOMEN: Soft, no abdominal tenderness, bowel sounds heard   NEURO: Moving all extremities, no focal weakness.  EXTREMITIES: 1+ pedal edema over the left lower extremity with BKA, tear over the stump of BKA. 2+ peripheral pulses.  SKIN: Warm, dry  Pressure injury sacrum stage I.  bruising left buttock area, left thigh consistent with extremity contusion/hematoma  PSYCHIATRY Cooperative       Medications:          [START ON 3/2/2019] aspirin  81 mg Oral Daily     carvedilol  3.125 mg Oral BID w/meals     gabapentin  600 mg Oral BID     torsemide  20 mg Oral Daily     vitamin B1  100 mg Oral Daily     acetaminophen, ipratropium, LORazepam, melatonin, naloxone, ondansetron **OR** ondansetron, oxyCODONE, polyethylene glycol, senna-docusate **OR** senna-docusate         Data:       All new lab and imaging data was reviewed.

## 2019-03-01 NOTE — PLAN OF CARE
OT: plan to hold OT currently while nursing clarifies whether pt is okay to complete OOB mobility prior to results of lumbar imaging.

## 2019-03-01 NOTE — PLAN OF CARE
A&Ox4, calm and coop. Total care/turn&repos q2hrs. VSS. Yajaira diet, good appetite. Excoriated skin on patricia LE oozing, mepilex placed. Redness on spine, mepilex placed.  Left leg/buttock bruised, edema present, vasc surg saw. Lumber xray performed. Declines PRN pain meds. Tele afib, echo to be performed.

## 2019-03-01 NOTE — PROGRESS NOTES
Radiologist call regarding the CT pelvis result and want to do the CTA of the leg.  I think we should go ahead with the CTA of the leg at this time, if patient is stable to go.

## 2019-03-01 NOTE — CONSULTS
"CLINICAL NUTRITION SERVICES  -  ASSESSMENT NOTE      Recommendations Ordered by Registered Dietitian (RD):   Medical Food Supplement - pt willing to dink Boost Plus once daily (PM)     Malnutrition:   % Weight Loss:  Pt denies  % Intake:  No decreased intake noted  Subcutaneous Fat Loss:  None observed  Muscle Loss:  None observed  Fluid Retention:  Likely not nutrition-related    Malnutrition Diagnosis: Patient does not meet two of the above criteria necessary for diagnosing malnutrition          REASON FOR ASSESSMENT  Antonio Ramirez is a 73 year old male seen by Registered Dietitian for Admission Nutrition Risk Screen for stageable pressure injuries or large/non-healing wound or burn      NUTRITION HISTORY  - Information obtained from the pt.  He states he only eats one meal/day (dinner) but unable to tell me a typical meal.  Drinks Ensure, 1-2 servings/day, plus Pepsi. He doesn't usually snack on anything.  Per H&P, hx ETOH, he reported that he drinks 3-4 vodka dinks/day.  Per chart review, we had seen pt during admit last November and instructed him on high-protein diet for skin health.      CURRENT NUTRITION ORDERS  Diet Order:     Regular     Current Intake/Tolerance:  Pt states good appetite, he ate breakfast and also ordered a sandwich and soup for breakfast.      PHYSICAL FINDINGS  Observed  No nutrition-related physical findings observed  Obtained from Chart/Interdisciplinary Team  Edema - 3+ pitting left buttocks, 1+ right ankle  Blanchable redness along upper spine with some skin break down, blanchable redness on buttocks - WOCN consult ordered      ANTHROPOMETRICS  Height: 6' 1\"  Weight: 200 lbs 0 oz (90.7 kg) - stated, pt has not been weighed  Body mass index is 26.39 kg/m .  Weight Status:  Overweight BMI 25-29.9  IBW: 79 kg - adjusted for left BKA  % IBW: 115%  Weight History: He states his wt has been stable but that seems questionable. It appears he's had wt loss since December if current wt is " accurate.  Wt Readings from Last 15 Encounters:   02/28/19 90.7 kg (200 lb)   02/20/19 90.7 kg (200 lb)   02/17/19 90.7 kg (200 lb)   12/31/18 107.9 kg (237 lb 12.8 oz)   12/26/18 107.9 kg (237 lb 12.8 oz)   12/20/18 106.1 kg (234 lb)   12/19/18 109.3 kg (241 lb)   12/18/18 106.1 kg (234 lb)   12/14/18 106.1 kg (234 lb)   12/13/18 106.1 kg (234 lb)   12/12/18 106.1 kg (234 lb)   12/11/18 106.1 kg (234 lb)   12/06/18 110.6 kg (243 lb 12.8 oz)   12/06/18 110.6 kg (243 lb 12.8 oz)   11/27/18 105.5 kg (232 lb 8 oz)       LABS  Labs reviewed    MEDICATIONS  Medications reviewed      ASSESSED NUTRITION NEEDS PER APPROVED PRACTICE GUIDELINES:  Dosing Weight 82 kg- adjusted for overweight  Estimated Energy Needs: 5840-7613 kcals (25-30 Kcal/Kg)  Justification: maintenance  Estimated Protein Needs:  grams protein (1-1.2 g pro/Kg)  Justification: wound healing and CKD      MALNUTRITION:  % Weight Loss:  Pt denies  % Intake:  No decreased intake noted  Subcutaneous Fat Loss:  None observed  Muscle Loss:  None observed  Fluid Retention:  Likely not nutrition-related    Malnutrition Diagnosis: Patient does not meet two of the above criteria necessary for diagnosing malnutrition      NUTRITION DIAGNOSIS:  Inadequate protein intake related to limited meal pattern as evidenced by diet hx       NUTRITION INTERVENTIONS  Recommendations / Nutrition Prescription  Regular diet    Implementation  Nutrition education: discussed importance of adequate intake for healing  Medical Food Supplement - pt willing to dink Boost Plus once daily (PM)      Nutrition Goals  Pt will consume at least 80 gm protein daily      MONITORING AND EVALUATION:  Progress towards goals will be monitored and evaluated per protocol and Practice Guidelines    Ya Denny RD  Pager [phone] (M-F)            [phone] (W/E & Hol)

## 2019-03-01 NOTE — PROGRESS NOTES
.RECEIVING UNIT ED HANDOFF REVIEW    ED Nurse Handoff Report was reviewed by: Jg Reddy on February 28, 2019 at 7:56 PM       Notes read.   Awaiting floor orders.   Room ready in 605-1

## 2019-03-01 NOTE — H&P
[facility]    History and Physical  Hospitalist       Date of Admission:  2/28/2019    Assessment & Plan   Antonio Ramirez is a 73 year old male with hx of PE in 2006 and PAF on Xarelto, alcohol use with cirrhosis of the liver, who fell 10 days ago on left buttock, has pain and now has hgb of 6.3, suspect bled into buttock area:      Summary:    Principal Problem:    Traumatic hematoma of buttock   -- CT shows swelling in left buttock area (suspect blood from trauma)   (radiologist want to repeat it with contrast, but creat is 1.96 and clinically   Not necessary)    Active Problems:    Alcohol abuse   -- denies withdrawal symptoms when stops drinking, but will order Ativan   As needed, and Thiamine, and start CIWA orders if withdrawal symptoms      Alcoholic cirrhosis of liver -- from records      Hyperkalemia -- suspect related to hematoma and CRF stage 3      Benign essential hypertension    CAD, S/P CABG in 2007    Aortic stenosis -- moderate on Echo 11/29/18    Pulmonary hypertension (H)    Paroxysmal atrial fibrillation (H)    Hx of PE in 2007    Acute blood loss anemia   -- transfuse 1 unit PRBC's     CRF (chronic renal failure), stage 3 (moderate) (H)    Elevated INR of 7.45 -- ?2nd to Xarelto, Cirrhosis, and nutritional   -- Xarelto held   -- Vit K 2 mg IV, check INR in AM.     Stage 1 pressure ulcer, sacral area    -- wound care nurse to evaluate     Plan:  IV fluids, repeat labs in AM.  Will get PT and OT eval -- might need TCU at discharge if needs help getting around.     DVT Prophylaxis: Pneumatic Compression Devices  Code Status: Full Code    Disposition: Expected discharge in 2-3 days once hgb stable and INR improved.  ?TCU pending PT and OT input.    Davie Osborn MD  Pager: [phone]  Cell Phone:  [phone]     Primary Care Physician   Main Hardy    Chief Complaint   Left buttock pain    History is obtained from Patient    History of Present Illness    73 year  old male with hx of PE in 2006, had an IVC filter placed in 2007, and PAF on Xarelto, alcohol use (admits to 3-4 Vodka drinks a day) with cirrhosis of the liver, who fell 10 days ago on left buttock, continues to have pain in that area, and his chronically anemia -- last was 7.7 on 12/13/18, but now is down to 6.3 with MCV of 101.  Normal Vit B12 on 5/18/18, and last iron studies 8/16/18 with Iron 52, TIBC low at 206, and 25% saturation -- consistent with anemia of chronic disease. His stool is guaiac negative in the ER today.      Also, baseline creatinine is 1.2 when good (last was 1.15 on 12/27/18) -- but today is 1.96.  Also Na 131 and potassium 5.4.  He denies withdrawal symptoms when he stops drinking.  His INR came back at 7.45.  His last INR on 12/21/18 was 1.85.  He is on Xarelto 20 mg daily, and reports minimal appetite, only eats one meal a day.        Past Medical History    I have reviewed this patient's medical history and updated it with pertinent information if needed.   Past Medical History:   Diagnosis Date     Alcoholism (H)      Amputated left leg (H)      Anemia      Anticardiolipin antibody syndrome (H)      Antiphospholipid antibody syndrome (H)      Antiplatelet or antithrombotic long-term use      Aortic root dilatation (H)      Aortic stenosis      Arthritis      Balance problem      Coronary artery disease     CABG 2007: SVG to LAD, SVG to diagonal, SVG to OM; cardiac cath 5/17/18: thrombectomy for restenosis of stents-sent for urgent CABG, cath 5/14/2007: GRECIA x2 to LAD, [place] to diagonal     Gastro-oesophageal reflux disease      Heart attack (H) 2007    apparently arrested, cardiac X5     Hiatal hernia      Hypercholesterolemia      Hypertension      Ischemic cardiomyopathy      Left foot drop      Liver disease     alcoholic liver disease, alcoholic cirrohsosis, portal hypertension     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)     ?When diagnosed, patient not aware of this      Moderate mitral regurgitation      Monoclonal gammopathy      Neuropathy      Noninfectious ileitis     diverticulitis of colon     Nonrheumatic tricuspid valve regurgitation      Paroxysmal atrial fibrillation (H)      PE (pulmonary embolism) 2006    saddle emboli 2006     Prostate cancer (H) 2000    Treated with radiation (seed implants in 2000) -- no problems since     Pulmonary hypertension (H)      Renal disease     kidney stones     Syncope      Thrombocytopenia (H)      Urethral stricture      Venous thrombosis     IVC filter and lysis procedures 2007       Past Surgical History   I have reviewed this patient's surgical history and updated it with pertinent information if needed.  Past Surgical History:   Procedure Laterality Date     AMPUTATE LEG BELOW KNEE Left 04/2014    related to gangrene, started as foot ulcer related to neuropathy     AMPUTATE LEG BELOW KNEE Left 12/4/2017    Procedure: AMPUTATE LEG BELOW KNEE;  Exploration of Left Leg Below Knee Amputation Stump with Ligation of Bleeders.;  Surgeon: Leandro Arreola MD;  Location:  OR     APPENDECTOMY       APPLY WOUND VAC Left 12/6/2017    Procedure: APPLY WOUND VAC;;  Surgeon: Saima Howard MD;  Location:  SD     ARTHROPLASTY HIP Right 11/27/2018    Procedure: RIGHT TOTAL HIP ARTHROPLASTY;  Surgeon: Saima Howard MD;  Location:  OR     ARTHROPLASTY KNEE Right 9/19/2014    Procedure: ARTHROPLASTY KNEE;  Surgeon: Saima Howard MD;  Location:  OR     CARDIAC SURGERY      CABG     CHOLECYSTECTOMY       COLONOSCOPY       COMBINED CYSTOSCOPY, RETROGRADES, EXCHANGE STENT URETER(S) Left 7/24/2018    Procedure: COMBINED CYSTOSCOPY, RETROGRADES, EXCHANGE STENT URETER(S);  CYSTOSCOPY, BILATERAL RETROGRADES, BILATERAL URETERAL STENT EXCHANGE ;  Surgeon: Matt Cervantes MD;  Location:  OR     CRANIOTOMY Right 4/7/2018    Procedure: CRANIOTOMY;  Right Craniotomy For Subdural Hematoma;  Surgeon: Jono Issa,  MD;  Location:  OR     CYSTOSCOPY, DILATE URETHRA, COMBINED N/A 10/12/2016    Procedure: COMBINED CYSTOSCOPY, DILATE URETHRA;  Surgeon: Dimitry Bello MD;  Location:  OR     CYSTOSCOPY, REMOVE STENT(S), COMBINED Right 7/24/2018    Procedure: COMBINED CYSTOSCOPY, REMOVE STENT(S);;  Surgeon: Jose Hunter MD;  Location:  OR     ENDOSCOPIC STRIPPING VEIN(S)       esophagogastroduodenoscopy       EYE SURGERY      cataracts     GENITOURINARY SURGERY      Prostate Seen Implants     HERNIA REPAIR      Umbilical     INCISION AND DRAINAGE LOWER EXTREMITY, COMBINED Left 12/1/2017    Procedure: COMBINED INCISION AND DRAINAGE LOWER EXTREMITY;  INCISION AND DRAINAGE OF LEFT BELOW KNEE AMPUTATION ABSCESS, WOUND VAC PLACEMENT;  Surgeon: Saima Howard MD;  Location:  OR     IR IVC FILTER PLACEMENT       IRRIGATION AND DEBRIDEMENT LOWER EXTREMITY, COMBINED Left 12/6/2017    Procedure: COMBINED IRRIGATION AND DEBRIDEMENT LOWER EXTREMITY;  IRRIGATION AND DEBRIDEMENT OF LEFT BELOW KNEE AMPUTATION SITE WITH WOUND VAC REPLACEMENT;  Surgeon: Saima Howard MD;  Location:  [facility]     IRRIGATION AND DEBRIDEMENT LOWER EXTREMITY, COMBINED Left 12/8/2017    Procedure: COMBINED IRRIGATION AND DEBRIDEMENT LOWER EXTREMITY;  IRRIGATION AND DEBRIDEMENT OF LEFT BELOW THE KNEE AMPUTATION AND SHORTENING OF THE TIBIA WITH WOUND CLOSURE;  Surgeon: Saima Howard MD;  Location:  OR     LASER HOLMIUM LITHOTRIPSY URETER(S), INSERT STENT, COMBINED Bilateral 6/28/2018    Procedure: COMBINED CYSTOSCOPY, URETEROSCOPY, LASER HOLMIUM LITHOTRIPSY URETER(S), INSERT STENT;  CYSTOSCOPY, BILATERAL URETEROSCOPY,  HOLMIUM LASER LITHOTRIPSY, BILATERAL STENT PLACEMENT, STONE BASKETING;  Surgeon: Jose Hunter MD;  Location:  OR     LASER HOLMIUM LITHOTRIPSY URETER(S), INSERT STENT, COMBINED Left 8/14/2018    Procedure: COMBINED CYSTOSCOPY, URETEROSCOPY, LASER HOLMIUM LITHOTRIPSY URETER(S), INSERT STENT;  CYSTOSCOPY, LEFT URETEROSCOPY,  "LEFT LASER HOLMIUM LITHOTRIPSY URETER(S) REMOVAL BILATERAL STENTS;  Surgeon: Jose Hunter MD;  Location:  OR     ORTHOPEDIC SURGERY      (R) knee scope     ORTHOPEDIC SURGERY      total hip      ORTHOPEDIC SURGERY      elbow surgery       Prior to Admission Medications   Prior to Admission Medications   Prescriptions Last Dose Informant Patient Reported? Taking?   Camphor-Menthol (MENTHOLATUM EX) prn Self Yes Yes   Sig: Externally apply topically nightly as needed   XARELTO 20 MG TABS tablet 2/28/2019 at am Self No Yes   Sig: TAKE 1 TABLET(20 MG) BY MOUTH DAILY   aspirin 81 MG tablet 2/27/2019 at am Self Yes Yes   Sig: Take 81 mg by mouth daily   carvedilol (COREG) 3.125 MG tablet 2/28/2019 at am Self No Yes   Sig: TAKE 1 TABLET(3.125 MG) BY MOUTH TWICE DAILY WITH MEALS   fluticasone (FLONASE) 50 MCG/ACT spray prn Self Yes Yes   Sig: Spray 1 spray into both nostrils daily as needed    gabapentin (NEURONTIN) 600 MG tablet 2/28/2019 at am Self No Yes   Sig: Take 1 tablet (600 mg) by mouth 2 times daily   ipratropium (ATROVENT) 0.06 % nasal spray prn Self Yes Yes   Sig: Spray 2 sprays into both nostrils 4 times daily   torsemide (DEMADEX) 20 MG tablet 2/27/2019 at Unknown time Self Yes Yes   Sig: Take 20 mg by mouth daily      Facility-Administered Medications: None     Allergies   Allergies   Allergen Reactions     Ciprofloxacin Itching     Severe itching \"like ants were crawling\"     Hmg-Coa-R Inhibitors Other (See Comments)     Rhabdomyolysis occurred within a couple days       Social History   I have reviewed this patient's social history and updated it with pertinent information if needed. Antonio Ramirez  reports that  has never smoked. he has never used smokeless tobacco. He reports that he drinks alcohol. He reports that he does not use drugs.    Family History   I have reviewed this patient's family history and updated it with pertinent information if needed.   Family History   Problem Relation Age of " Onset     Lung Cancer Mother      Heart Failure Father          age 87       Review of Systems   The 10 point Review of Systems is negative other than noted in the HPI or here.     # Pain Assessment:  Current Pain Score 2019   Patient currently in pain? -   Pain score (0-10) 10   Pain location -   Pain descriptors -   - Antonio is experiencing pain due to left buttock pain. Pain management was discussed and the plan was created in a collaborative fashion.  Antonio's response to the current recommendations: engaged  - see orders    Physical Exam   Temp: 97.5  F (36.4  C) Temp src: Oral BP: 95/57 Pulse: 56 Heart Rate: 54 Resp: 14 SpO2: 99 % O2 Device: None (Room air)    Vital Signs with Ranges  Temp:  [97.5  F (36.4  C)-99  F (37.2  C)] 97.5  F (36.4  C)  Pulse:  [52-66] 56  Heart Rate:  [52-63] 54  Resp:  [13-14] 14  BP: ()/(53-66) 95/57  SpO2:  [97 %-99 %] 99 %  200 lbs 0 oz    Constitutional: Awake, alert, cooperative, no apparent distress.  Eyes: Conjunctiva and pupils examined and normal.  HEENT: Moist mucous membranes, normal dentition.  Respiratory: Clear to auscultation bilaterally, no crackles or wheezing.  Cardiovascular: Regular rate and rhythm, normal S1 and S2, and 3/6 systolic  murmur at left sternal border.  No carotid bruits. 3+ pitting edema in left buttock area, and 1+ right ankle edema.   GI: Soft, non-distended, non-tender, normal bowel sounds.  Lymph/Hematologic: No anterior cervical, supraclavicular or axillary adenopathy.  Skin: No rashes, no cyanosis.   Musculoskeletal: No joint swelling, erythema or tenderness.  Neurologic: Cranial nerves 2-12 intact, no focal weakness or numbness, not tremulous.   Psychiatric: Alert, Ox3, no obvious anxiety or depression.    Data   Data reviewed today:  I personally reviewed the EKG tracing showing afib with rate of 57   .  Recent Labs   Lab 19  1625   WBC 6.8   HGB 6.3*   *      INR 7.45*   *   POTASSIUM 5.4*   CHLORIDE  101   CO2 26   BUN 65*   CR 1.96*   ANIONGAP 4   BENNIE 8.5   *   ALBUMIN 2.7*   PROTTOTAL 6.9   BILITOTAL 2.9*   ALKPHOS 90   ALT 11   AST 24   TROPI 0.030       Imaging:  Recent Results (from the past 24 hour(s))   US Lower Extremity Venous Duplex Left    Narrative    VENOUS ULTRASOUND LEFT LEG  2/28/2019 6:05 PM     HISTORY: Left leg swelling 2+ edema. Left hip and leg pain greater  than 2 weeks.    COMPARISON: 2/20/2019    TECHNIQUE: Examination of the deep veins was completed with graded  compression and color flow Doppler with spectral wave form analysis.    FINDINGS: There is no evidence of thrombus in the common femoral,  femoral, or popliteal veins. There is no evidence of thrombus within  the calf veins.      Impression    IMPRESSION: No evidence of deep venous thrombosis within the left  lower extremity.    FEDERICO VAUGHN MD   CT Hip Left w/o Contrast    Narrative    CT LEFT HIP WITHOUT CONTRAST 2/28/2019 7:40 PM    Report: CT of the pelvis without contrast.    COMPARISON: X-rays 2/17/2019    TECHNIQUE: Contiguous computed tomography imaging of the pelvis was  performed without administration of intravenous contrast according to  the standard protocol. Coronal sagittal reformats were reconstructed  in bone and soft tissue algorithms.    HISTORY: Fall on Xarelto.    FINDINGS: Multiple intramuscular hematomas are present within the left  lower extremity involving the left gluteus musculature, left posterior  compartment/hamstring musculature at the inferior edge of the  field-of-view. Diffuse soft tissue swelling is present throughout the  visualized left upper extremity. Scattered extensive vascular  calcifications are present. Bilateral hip prostheses are present. No  fractures are identified. Small volume ascites is present throughout  the visualized abdomen and pelvis. Extensive sclerosis and  degenerative change are present throughout the visualized lumbar  spine. Brachytherapy prostatic seeds  noted.      Impression    IMPRESSION: Multiple intramuscular hematomas throughout the left lower  extremity most conspicuously involving the gluteus musculature and  posterior compartment, incompletely characterized. Diffuse  edema/swelling throughout the visualized left upper extremity.

## 2019-03-01 NOTE — PLAN OF CARE
Pt A&Ox4. VSS with BP in the 90's/50's. On RA. Pt able to eat something in ED, Reg diet. Reported BM in the ED, urinal given to void. BKA of L leg with bruising and weeping skin. Wound RN has been consulted. LLE with +2 edema. Large hematoma along L hip and buttocks form prev fall. CT completed in ED of L hip. Hgb 6.3 and 1 unit transfused in ED and completed when pt arrived at floor, recheck with AM labs. INR at 7.45, Phytonadione infusion started, cont NS to run at 100ml/hr. Pain 9/10 in L hip/buttocks, PRN Oxycodone 5mg given. Pt able to assist with turns, blanchable redness along upper spine with some skin break down, blanchable redness on buttocks, pt turned and will need to be repositioned. LLE elevated with absorbing pad in place. Bed alarm on for safety, call light used as needed.

## 2019-03-01 NOTE — ED NOTES
Patient was in CT for 30 minute vital sign check. Vital signs were obtained as soon as patient returned. Vital signs stable since that time patient has been provided with juice and a meal tray with MD burch

## 2019-03-02 ENCOUNTER — APPOINTMENT (OUTPATIENT)
Dept: MRI IMAGING | Facility: CLINIC | Age: 74
DRG: 605 | End: 2019-03-02
Attending: INTERNAL MEDICINE
Payer: MEDICARE

## 2019-03-02 ENCOUNTER — APPOINTMENT (OUTPATIENT)
Dept: PHYSICAL THERAPY | Facility: CLINIC | Age: 74
DRG: 605 | End: 2019-03-02
Attending: INTERNAL MEDICINE
Payer: MEDICARE

## 2019-03-02 LAB
ANION GAP SERPL CALCULATED.3IONS-SCNC: 6 MMOL/L (ref 3–14)
BUN SERPL-MCNC: 59 MG/DL (ref 7–30)
CALCIUM SERPL-MCNC: 8.5 MG/DL (ref 8.5–10.1)
CHLORIDE SERPL-SCNC: 105 MMOL/L (ref 94–109)
CO2 SERPL-SCNC: 22 MMOL/L (ref 20–32)
CREAT SERPL-MCNC: 1.83 MG/DL (ref 0.66–1.25)
ERYTHROCYTE [DISTWIDTH] IN BLOOD BY AUTOMATED COUNT: 17.2 % (ref 10–15)
GFR SERPL CREATININE-BSD FRML MDRD: 35 ML/MIN/{1.73_M2}
GLUCOSE SERPL-MCNC: 96 MG/DL (ref 70–99)
HCT VFR BLD AUTO: 24 % (ref 40–53)
HGB BLD-MCNC: 7.4 G/DL (ref 13.3–17.7)
INR PPP: 3.16 (ref 0.86–1.14)
MAGNESIUM SERPL-MCNC: 2.6 MG/DL (ref 1.6–2.3)
MCH RBC QN AUTO: 31.5 PG (ref 26.5–33)
MCHC RBC AUTO-ENTMCNC: 30.8 G/DL (ref 31.5–36.5)
MCV RBC AUTO: 102 FL (ref 78–100)
PHOSPHATE SERPL-MCNC: 3.6 MG/DL (ref 2.5–4.5)
PLATELET # BLD AUTO: 289 10E9/L (ref 150–450)
POTASSIUM SERPL-SCNC: 5.6 MMOL/L (ref 3.4–5.3)
RBC # BLD AUTO: 2.35 10E12/L (ref 4.4–5.9)
SODIUM SERPL-SCNC: 133 MMOL/L (ref 133–144)
WBC # BLD AUTO: 10.3 10E9/L (ref 4–11)

## 2019-03-02 PROCEDURE — 25000132 ZZH RX MED GY IP 250 OP 250 PS 637: Mod: GY | Performed by: INTERNAL MEDICINE

## 2019-03-02 PROCEDURE — 25000132 ZZH RX MED GY IP 250 OP 250 PS 637: Mod: GY | Performed by: HOSPITALIST

## 2019-03-02 PROCEDURE — 12000000 ZZH R&B MED SURG/OB

## 2019-03-02 PROCEDURE — 72148 MRI LUMBAR SPINE W/O DYE: CPT

## 2019-03-02 PROCEDURE — 85027 COMPLETE CBC AUTOMATED: CPT | Performed by: HOSPITALIST

## 2019-03-02 PROCEDURE — 80048 BASIC METABOLIC PNL TOTAL CA: CPT | Performed by: HOSPITALIST

## 2019-03-02 PROCEDURE — A9270 NON-COVERED ITEM OR SERVICE: HCPCS | Mod: GY | Performed by: HOSPITALIST

## 2019-03-02 PROCEDURE — 83735 ASSAY OF MAGNESIUM: CPT | Performed by: HOSPITALIST

## 2019-03-02 PROCEDURE — 84100 ASSAY OF PHOSPHORUS: CPT | Performed by: HOSPITALIST

## 2019-03-02 PROCEDURE — 97161 PT EVAL LOW COMPLEX 20 MIN: CPT | Mod: GP | Performed by: PHYSICAL THERAPIST

## 2019-03-02 PROCEDURE — A9270 NON-COVERED ITEM OR SERVICE: HCPCS | Mod: GY | Performed by: INTERNAL MEDICINE

## 2019-03-02 PROCEDURE — 97530 THERAPEUTIC ACTIVITIES: CPT | Mod: GP | Performed by: PHYSICAL THERAPIST

## 2019-03-02 PROCEDURE — 36415 COLL VENOUS BLD VENIPUNCTURE: CPT | Performed by: HOSPITALIST

## 2019-03-02 PROCEDURE — 99233 SBSQ HOSP IP/OBS HIGH 50: CPT | Performed by: INTERNAL MEDICINE

## 2019-03-02 PROCEDURE — 85610 PROTHROMBIN TIME: CPT | Performed by: HOSPITALIST

## 2019-03-02 RX ORDER — MULTIVITAMIN,THERAPEUTIC
1 TABLET ORAL DAILY
Status: DISCONTINUED | OUTPATIENT
Start: 2019-03-02 | End: 2019-03-14 | Stop reason: HOSPADM

## 2019-03-02 RX ADMIN — THERA TABS 1 TABLET: TAB at 08:45

## 2019-03-02 RX ADMIN — Medication 1000 MCG: at 08:45

## 2019-03-02 RX ADMIN — CARVEDILOL 3.12 MG: 3.12 TABLET, FILM COATED ORAL at 08:45

## 2019-03-02 RX ADMIN — GABAPENTIN 300 MG: 300 CAPSULE ORAL at 08:47

## 2019-03-02 RX ADMIN — FOLIC ACID 1 MG: 1 TABLET ORAL at 08:45

## 2019-03-02 RX ADMIN — RANITIDINE 150 MG: 150 TABLET ORAL at 08:44

## 2019-03-02 RX ADMIN — OXYCODONE HYDROCHLORIDE 5 MG: 5 TABLET ORAL at 10:00

## 2019-03-02 RX ADMIN — Medication 100 MG: at 08:44

## 2019-03-02 ASSESSMENT — ACTIVITIES OF DAILY LIVING (ADL)
ADLS_ACUITY_SCORE: 27
ADLS_ACUITY_SCORE: 25
ADLS_ACUITY_SCORE: 32
ADLS_ACUITY_SCORE: 26
ADLS_ACUITY_SCORE: 27
ADLS_ACUITY_SCORE: 26

## 2019-03-02 NOTE — PROGRESS NOTES
[facility]    Medicine Progress Note - Hospitalist Service       Date of Admission:  2/28/2019  Assessment & Plan   Antonio Ramirez is a 73 year old male admitted on 2/28/2019. He due to pain in his left leg after a fall about 2 weeks ago    1: Left leg hematomas due to a mechanical fall.  He feels alcohol use was a contributing factor to the fall.  2: Back pain of 2 weeks duration after another fall with pain into both buttocks and upper legs, left more than right.  Plain films yesterday showed no fracture, some disc dehydration.  Will pursue a MRI to see if there is something else that needs to be done  3: History of paroxysmal afib, on Rivaroxaban pre admission. Presented with an INR of 7.45, improved and today is 3.16. Since admission he has been in Atrial Fibrillation all the time.  4: No signs of ETOH withdrawal, can stop the withdrawal protocol.   5: S/P subdural hematoma evacuation in 3/2018  6: Acute on Chronic anemia with macrocytosis, stable after a one unit transfusion on 2/28/2019  7: History of Cardiomyopathy with reduce Ejection Fraction (40-45%), mild aortic stenosis and moderate mitral and tricuspid regurgitation and mild ascending aorta dilation  8: History of CAD, s/p 3 vessel CABG  9: Acute on Chronic kidney injury.  Has not improved with hydration.  Creatinine/eGFR now in the 1.9-1.8/32/35 range so stage 3B renal function  10: Slight increase in K in the last 24 hours from 5.0 to 5.6. Not receiving K supplementation but with reduced renal function and blood being reabsorbed, that maybe an issue. Will recheck a BMP in the AM  11: Antiphospholipid antibody syndrome with a previous pulmonary and Deep Venous embolism/thrombosis, PTA Rivaroxaban, now off that and ASA  12: Hyperlipidemia, in the face of CAD, intolerant of Statins  13: Hypertension, treated and controlled  14: GERD, on Ranitidine 150mg bid  15: Low grade B cell lymphoproliferative disorder, being managed as an  outpatient  16: History of prostate cancer in 2000, unlikely contributing to the back and buttocks pain but could be  17: Social concerns. Wife is petite and although able bodied, can not manage him as he is. Likely will need  TCU for a while.  Social Work Consult.  18: Sore on back, Wound nurse asked to see him already.    Addendum: I ordered the MRI of his LS spine. I tried to explain this to him and he was not interested and agreed I could call his wife. I called Mrs. Ramirez and explained the L4-5 spinal stenosis, no fractures or tumors (lytic lesions) seen. The best course would be to allow this to heal and if the pain persists to discuss with Dr. Hardy, his primary MD and if appropriate consider an epidural injection and if needed surgical consultation.  Will plan on discharge to a TCU as early as tomorrow.       Diet: Combination Diet Regular Diet Adult  Snacks/Supplements Adult: Boost Plus; Between Meals  Room Service    DVT Prophylaxis: Pneumatic Compression Devices  Levin Catheter: not present  Code Status: Full Code      Disposition Plan   Expected discharge: 2 - 3 days, recommended to transitional care unit once adequate pain management/ tolerating PO medications and resolution or the cause of the back/buttocks pain found  Entered: Shahbaz Hernandez MD 03/02/2019, 11:04 AM       The patient's care was discussed with the Patient and Patient's Family.    Shahbaz Hernandez MD  Hospitalist Service  [facility]    ______________________________________________________________________    Interval History   Mr. Ramirez is having a lot of pain in his left leg and also back pain and bilateral buttocks pain. Both are related to falls, the left leg about 10 days ago and the back and buttocks about 2 weeks ago.  He has had frequent falls, not regular.  In 3/2018 he had a subdural evacuated. He admits to drinking too much at times, he likes it, has no thoughts of quitting. He lives with his wife who is  able bodied but petite and she can't lift him. He has no abdominal pain. He hit his head 10 days ago but no neurologic symptoms and his wife has not noted any changes and no headaches. He has a left BKA due to gangrene years ago.  His left leg is more swollen due to the most recent fall. He is voiding ok, had prostate cancer around 2000.  No problems with his bowels. Is eating and drinking fluids and no confusion or agitation seen per nursing. No nausea or vomiting.      Data reviewed today: I reviewed all medications, new labs and imaging results over the last 24 hours. I personally reviewed no images or EKG's today.    Physical Exam   Vital Signs: Temp: 97.9  F (36.6  C) Temp src: Oral BP: 103/53 Pulse: 57 Heart Rate: 60 Resp: 18 SpO2: 93 % O2 Device: None (Room air)    Weight: 200 lbs 0 oz  General Appearance: Clearly uncomfortable, sitting in a chair, more on his right side than left.  Moves both legs adequately. Left leg swollen and ecchymotic. Right leg trace swelling  Respiratory: clear to A&P  Cardiovascular: Irregularly irregular rhythm, normal S2, variable S1, no S3  2/6 midsystolic murmur, doesn't radiate to the carotids, no LUIS heard. Rhythm strips show Afib with controlled ventricular response.  Skin: dry, ecchymotic. Large bandage over lower thoracic spinous processes, not removed as the wound care nurse has seen him but has not addressed this  Other:  also scattered small open wounds     Data   Recent Labs   Lab 03/02/19  0720 03/01/19  1210 02/28/19  1625   WBC 10.3 6.5 6.8   HGB 7.4* 7.1* 6.3*   * 101* 101*    229 229   INR 3.16* 3.85* 7.45*    132* 131*   POTASSIUM 5.6* 5.0 5.4*   CHLORIDE 105 103 101   CO2 22 23 26   BUN 59* 61* 65*   CR 1.83* 1.84* 1.96*   ANIONGAP 6 6 4   BENNIE 8.5 8.0* 8.5   GLC 96 123* 111*   ALBUMIN  --   --  2.7*   PROTTOTAL  --   --  6.9   BILITOTAL  --   --  2.9*   ALKPHOS  --   --  90   ALT  --   --  11   AST  --   --  24   TROPI  --   --  0.030      Recent Results (from the past 24 hour(s))   XR Lumbar Spine 2/3 Views    Narrative    LUMBAR SPINE TWO - THREE VIEWS  3/1/2019 1:32 PM     HISTORY: Midline lumbar pain status post fall.    COMPARISON: None.      Impression    IMPRESSION: Diffuse intervertebral disc space narrowing throughout the  lumbar spine, to the greatest degree at L5-S1. Suspect spinal canal  narrowing, though difficult to assess on radiographs. No obvious  compression deformity or acute fracture. Prosthetic radiation seeds  and IVC filter visible, as well as partial visualization of bilateral  total hip arthroplasties.    NICK LEBLANC MD

## 2019-03-02 NOTE — PLAN OF CARE
PT: Orders received, chart reviewed conferred with Nursing who agrees that a determination of safe WB status needs to be made prior to mobilizing patient.  Will reschedule Eval for tomorrow.

## 2019-03-02 NOTE — PLAN OF CARE
Pt is disoriented to situation and time, forgetful, flat affect. CIWAs 1. VSS on room air. Tele: afib CVR. Denied pain. LLE, RLE and lower back mepilex, CDI. +2/3 left leg edema, +2 RLE edema. Reposition q2h. Regular diet. Incontinent or voids in urinal. PIV infusing NS @ 50mL/hr. Rested well overnight.

## 2019-03-02 NOTE — PLAN OF CARE
"Discharge Planner PT   Patient plan for discharge: None stated. RN reports TCU has been talked about.  Current status: Evaluation completed, treatment initiated. 72 yo male s/p fall 10+ days ago with L sided gluteal hematoma and back pain.   Resides in a house with his Wife. States he uses a walker and has walked up to 150' in the recent past with assist from Home PT. Reports he has been sleeping in a recliner. PMH includes L BKA, states his prosthesis does not fit because he has \"too much edema.\" L LE pitting edema 2+ stump to thigh. R sided edema present as well, not as \"puffy\" in appearance. Multiple wounds and bruises over the legs. Mepilex on the L calf rolled off during transfer, RN present to clean and re-dress.     Presents, alert and a bit agitated. Initially declines any activity stating it was morning and he wanted to sleep. After extensive education and rationale, pt agreeable. Current orders are for up with assist. Completed sup>sit Mod A x 1. Sitting balance requires constant attendance of contact guard to Mod A at times as he lists back and R secondary to pain in the L gluteal. However, did request to sit up in a chair. Sit<>stand trial x2 with Max A x 2. Pt is too weak to fully stand, therefore, use of universal sling bed>chair for optimal safe pt handling. Pt up in chair, chair alarm on all needs in reach. RN aware pt up in the chair.   At this time recommend OT evaluation be deferred to the next level of care. PT to address mobility at this time.  Barriers to return to prior living situation: Level of assist, fall risk, impaired strength, activity tolerance and balance.   Recommendations for discharge: TCU  Rationale for recommendations: Pt will benefit from continued skilled rehab services to progress independence and safety with bed mobility, transfers and household distance gait, prior to return home.         Entered by: Kemi Carney 03/02/2019 9:32 AM       "

## 2019-03-02 NOTE — PLAN OF CARE
Care Plan Summary Note: Tmax of 99.6, rechecked --97.9 with no tylenol given. Pt alert to self  and person only. Severe pain to left buttock. PRN oxycodone given with some relief. Although, pt did become more confused after pain meds given. Encouraged oral intake. IVF discontinued. Turn Q 2 hours with ax2. Feeding assistance. Wound care re consulted for mid/lower back open area. Tele Afib with CVR. Up in chair for breakfast with lift, didn't tolerate well due to pain. MRI checklist done with assistance from wife. Pt left unit at 1235 for MRI, wife present. CIWA and protocol discontinued. K+ 5.6, Hgb 7.4, MD aware. Monitor.

## 2019-03-02 NOTE — PLAN OF CARE
Discharge Planner OT   Patient plan for discharge: did not obtain  Current status: OT eval received, chart reviewed and noted patient tolerating very limited activity, requiring A of 1-2 with bed mob, Max A of 2 with sit-stand. At this time it was determined that skilled OT intervention will be deferred to next level of care when patient may be better able to participate in ADL retraining, and PT will continue to follow during inpatient stay to advance mobility. OT to sign off and complete order.   Barriers to return to prior living situation: see PT note  Recommendations for discharge: TCU has been recommended by PT  Rationale for recommendations: see PT note       Entered by: Guanaco Milton 03/02/2019 10:02 AM

## 2019-03-02 NOTE — PLAN OF CARE
A&Ox4 until end of shift when patient became confused and wasn't sure why he was here, what time of day it was, or where he was. Easily redirectable reoriented. Confusion maybe due to oxycodone or withdrawal. Before this event, CIWA 0 and 0. Repo q2. Small amount serosanguinous drainage to BLE mepilex. Both replaced. Mepilex to lower back CDI. NaCl at 50cc infusing, monitoring for fluid overload. 2+ edema in BLE. . Oxycodone x1 for HA. Tele afib SVR as low as 49, coreg held. Soft BP. Vit K given. Repo q2.

## 2019-03-02 NOTE — PROGRESS NOTES
03/02/19 0900   Quick Adds   Type of Visit Initial PT Evaluation   Living Environment   Lives With spouse   Living Arrangements apartment   Transportation Anticipated family or friend will provide   Self-Care   Usual Activity Tolerance fair   Current Activity Tolerance poor   Regular Exercise Yes   Equipment Currently Used at Home cane, straight;wheelchair, manual;walker, rolling;shower chair;grab bar, toilet   Activity/Exercise/Self-Care Comment Has been working with HOME PT on walking sdistances up to 150' with a walker and assist.    Functional Level Prior   Ambulation 3-->assistive equipment and person   Transferring 1-->assistive equipment   Fall history within last six months yes   Number of times patient has fallen within last six months 4   Which of the above functional risks had a recent onset or change? ambulation;transferring;toileting;dressing;bathing   Prior Functional Level Comment Prior admit indicates the pt had been ambuatin in home with a single end cane; however, pt has had a fall and decline in function. Pt not forward with much information wht subjecitve interveiw. Was irritated with questions. Did state he can usually dress himself, did not state if he has been in a wc as of late with the swelling in the L LE, just that he was working with a home PT.   General Information   Onset of Illness/Injury or Date of Surgery - Date 02/28/19   Referring Physician Davie Phillips MD   Patient/Family Goals Statement None stated   Pertinent History of Current Problem (include personal factors and/or comorbidities that impact the POC) 74 yo male with progressive weakness post fall 10+ days ago. PMH includes L BKA.    Precautions/Limitations fall precautions   Cognitive Status Examination   Orientation person;place   Level of Consciousness alert  (irritated)   Follows Commands and Answers Questions 75% of the time;able to follow single-step instructions   Personal Safety and Judgment  impulsive;impaired   Cognitive Comment Increased time to process commands, thought process is tangential, needs frequent cues to stay on mobility tasks.    Pain Assessment   Patient Currently in Pain Yes, see Vital Sign flowsheet  (Doesn't rate, just quickly/abrupt lean R and Posterior)   Integumentary/Edema   Integumentary/Edema Comments 2+ pitting LLE> R LE, multiple bruises and wounds over B LEs.  R calf with mepilex that came dislodged and RN present to re-dress.  Note pt with meplex over the lumbar/sacral area as well.    Posture    Posture Forward head position;Protracted shoulders;Kyphosis   Range of Motion (ROM)   ROM Comment B LEs WFL as obs in available planes, supine. Note likely combo of weakness and tight hip flexors limit extension in standing at the hips.   Strength   Strength Comments Demonstrates limited antigravity strength, impaired functional activity tolerance as well.   Bed Mobility   Bed Mobility Comments Sup>sit via 60 degree HOB elevation. Pt decoines attempt for logr oll.   Transfer Skills   Transfer Comments sit>stand Max A x 2   Gait   Gait Comments Dependent for bed.chair via lift.   Balance   Balance Comments Sitting balance CGA to Mod A at times, pt jolts R and posterior at times, reports secondary to chasidy in the L gluteal.   Sensory Examination   Sensory Perception Comments Neropathy at baseline.   General Therapy Interventions   Planned Therapy Interventions balance training;bed mobility training;gait training;neuromuscular re-education;ROM;strengthening;stretching;transfer training;progressive activity/exercise;wheelchair management/propulsion training   Clinical Impression   Criteria for Skilled Therapeutic Intervention yes, treatment indicated   PT Diagnosis Impaired functional activity tolerance   Influenced by the following impairments Weakness, decreased balance, decreased command following, fall risk   Functional limitations due to impairments Decreased functoinal independence  "  Clinical Presentation Stable/Uncomplicated   Clinical Presentation Rationale see MR   Clinical Decision Making (Complexity) Low complexity   Therapy Frequency` 5 times/week   Predicted Duration of Therapy Intervention (days/wks) 1 week   Anticipated Discharge Disposition Transitional Care Facility   Risk & Benefits of therapy have been explained Yes   Patient, Family & other staff in agreement with plan of care Yes   [facility] TM \"6 Clicks\"   2016, Trustees of [facility], under license to [employer].  All rights reserved.   6 Clicks Short Forms Basic Mobility Inpatient Short Form   [facility] AM-PAC  \"6 Clicks\" V.2 Basic Mobility Inpatient Short Form   1. Turning from your back to your side while in a flat bed without using bedrails? 2 - A Lot   2. Moving from lying on your back to sitting on the side of a flat bed without using bedrails? 2 - A Lot   3. Moving to and from a bed to a chair (including a wheelchair)? 1 - Total   4. Standing up from a chair using your arms (e.g., wheelchair, or bedside chair)? 1 - Total   5. To walk in hospital room? 1 - Total   6. Climbing 3-5 steps with a railing? 1 - Total   Basic Mobility Raw Score (Score out of 24.Lower scores equate to lower levels of function) 8   Total Evaluation Time   Total Evaluation Time (Minutes) 10     "

## 2019-03-03 ENCOUNTER — APPOINTMENT (OUTPATIENT)
Dept: GENERAL RADIOLOGY | Facility: CLINIC | Age: 74
DRG: 605 | End: 2019-03-03
Attending: INTERNAL MEDICINE
Payer: MEDICARE

## 2019-03-03 LAB
ALBUMIN UR-MCNC: 10 MG/DL
ANION GAP SERPL CALCULATED.3IONS-SCNC: 8 MMOL/L (ref 3–14)
APPEARANCE UR: CLEAR
BASOPHILS # BLD AUTO: 0 10E9/L (ref 0–0.2)
BASOPHILS NFR BLD AUTO: 0.5 %
BILIRUB UR QL STRIP: NEGATIVE
BUN SERPL-MCNC: 55 MG/DL (ref 7–30)
CALCIUM SERPL-MCNC: 8.5 MG/DL (ref 8.5–10.1)
CHLORIDE SERPL-SCNC: 106 MMOL/L (ref 94–109)
CO2 SERPL-SCNC: 20 MMOL/L (ref 20–32)
COLOR UR AUTO: YELLOW
CREAT SERPL-MCNC: 1.81 MG/DL (ref 0.66–1.25)
DIFFERENTIAL METHOD BLD: ABNORMAL
EOSINOPHIL # BLD AUTO: 0 10E9/L (ref 0–0.7)
EOSINOPHIL NFR BLD AUTO: 0 %
ERYTHROCYTE [DISTWIDTH] IN BLOOD BY AUTOMATED COUNT: 17.2 % (ref 10–15)
GFR SERPL CREATININE-BSD FRML MDRD: 36 ML/MIN/{1.73_M2}
GLUCOSE SERPL-MCNC: 92 MG/DL (ref 70–99)
GLUCOSE UR STRIP-MCNC: NEGATIVE MG/DL
HCT VFR BLD AUTO: 23.3 % (ref 40–53)
HGB BLD-MCNC: 7.5 G/DL (ref 13.3–17.7)
HGB UR QL STRIP: NEGATIVE
IMM GRANULOCYTES # BLD: 0 10E9/L (ref 0–0.4)
IMM GRANULOCYTES NFR BLD: 0.2 %
KETONES UR STRIP-MCNC: NEGATIVE MG/DL
LEUKOCYTE ESTERASE UR QL STRIP: ABNORMAL
LYMPHOCYTES # BLD AUTO: 0.8 10E9/L (ref 0.8–5.3)
LYMPHOCYTES NFR BLD AUTO: 9.2 %
MCH RBC QN AUTO: 32.1 PG (ref 26.5–33)
MCHC RBC AUTO-ENTMCNC: 32.2 G/DL (ref 31.5–36.5)
MCV RBC AUTO: 100 FL (ref 78–100)
MONOCYTES # BLD AUTO: 0.4 10E9/L (ref 0–1.3)
MONOCYTES NFR BLD AUTO: 4.5 %
NEUTROPHILS # BLD AUTO: 7 10E9/L (ref 1.6–8.3)
NEUTROPHILS NFR BLD AUTO: 85.6 %
NITRATE UR QL: NEGATIVE
NRBC # BLD AUTO: 0 10*3/UL
NRBC BLD AUTO-RTO: 0 /100
PH UR STRIP: 5.5 PH (ref 5–7)
PLATELET # BLD AUTO: 212 10E9/L (ref 150–450)
POTASSIUM SERPL-SCNC: 5.3 MMOL/L (ref 3.4–5.3)
PROCALCITONIN SERPL-MCNC: 0.77 NG/ML
RBC # BLD AUTO: 2.34 10E12/L (ref 4.4–5.9)
RBC #/AREA URNS AUTO: 6 /HPF (ref 0–2)
SODIUM SERPL-SCNC: 134 MMOL/L (ref 133–144)
SOURCE: ABNORMAL
SP GR UR STRIP: 1.01 (ref 1–1.03)
SQUAMOUS #/AREA URNS AUTO: 1 /HPF (ref 0–1)
UROBILINOGEN UR STRIP-MCNC: NORMAL MG/DL (ref 0–2)
WBC # BLD AUTO: 8.2 10E9/L (ref 4–11)
WBC #/AREA URNS AUTO: 23 /HPF (ref 0–5)

## 2019-03-03 PROCEDURE — 87088 URINE BACTERIA CULTURE: CPT | Performed by: INTERNAL MEDICINE

## 2019-03-03 PROCEDURE — 87040 BLOOD CULTURE FOR BACTERIA: CPT | Performed by: INTERNAL MEDICINE

## 2019-03-03 PROCEDURE — 84145 PROCALCITONIN (PCT): CPT | Performed by: INTERNAL MEDICINE

## 2019-03-03 PROCEDURE — 81001 URINALYSIS AUTO W/SCOPE: CPT | Performed by: INTERNAL MEDICINE

## 2019-03-03 PROCEDURE — 25800030 ZZH RX IP 258 OP 636: Performed by: INTERNAL MEDICINE

## 2019-03-03 PROCEDURE — A9270 NON-COVERED ITEM OR SERVICE: HCPCS | Mod: GY | Performed by: HOSPITALIST

## 2019-03-03 PROCEDURE — 25000132 ZZH RX MED GY IP 250 OP 250 PS 637: Mod: GY | Performed by: HOSPITALIST

## 2019-03-03 PROCEDURE — 25000132 ZZH RX MED GY IP 250 OP 250 PS 637: Mod: GY | Performed by: INTERNAL MEDICINE

## 2019-03-03 PROCEDURE — 99233 SBSQ HOSP IP/OBS HIGH 50: CPT | Performed by: INTERNAL MEDICINE

## 2019-03-03 PROCEDURE — 99232 SBSQ HOSP IP/OBS MODERATE 35: CPT | Performed by: SURGERY

## 2019-03-03 PROCEDURE — 85025 COMPLETE CBC W/AUTO DIFF WBC: CPT | Performed by: INTERNAL MEDICINE

## 2019-03-03 PROCEDURE — 71045 X-RAY EXAM CHEST 1 VIEW: CPT

## 2019-03-03 PROCEDURE — 87186 SC STD MICRODIL/AGAR DIL: CPT | Performed by: INTERNAL MEDICINE

## 2019-03-03 PROCEDURE — 36415 COLL VENOUS BLD VENIPUNCTURE: CPT | Performed by: INTERNAL MEDICINE

## 2019-03-03 PROCEDURE — 87800 DETECT AGNT MULT DNA DIREC: CPT | Performed by: INTERNAL MEDICINE

## 2019-03-03 PROCEDURE — 87077 CULTURE AEROBIC IDENTIFY: CPT | Performed by: INTERNAL MEDICINE

## 2019-03-03 PROCEDURE — 87086 URINE CULTURE/COLONY COUNT: CPT | Performed by: INTERNAL MEDICINE

## 2019-03-03 PROCEDURE — A9270 NON-COVERED ITEM OR SERVICE: HCPCS | Mod: GY | Performed by: INTERNAL MEDICINE

## 2019-03-03 PROCEDURE — 80048 BASIC METABOLIC PNL TOTAL CA: CPT | Performed by: INTERNAL MEDICINE

## 2019-03-03 PROCEDURE — 12000000 ZZH R&B MED SURG/OB

## 2019-03-03 PROCEDURE — 25000128 H RX IP 250 OP 636: Performed by: INTERNAL MEDICINE

## 2019-03-03 RX ORDER — SODIUM CHLORIDE 9 MG/ML
INJECTION, SOLUTION INTRAVENOUS CONTINUOUS
Status: DISCONTINUED | OUTPATIENT
Start: 2019-03-03 | End: 2019-03-05 | Stop reason: CLARIF

## 2019-03-03 RX ORDER — PIPERACILLIN SODIUM, TAZOBACTAM SODIUM 3; .375 G/15ML; G/15ML
3.38 INJECTION, POWDER, LYOPHILIZED, FOR SOLUTION INTRAVENOUS EVERY 6 HOURS
Status: DISCONTINUED | OUTPATIENT
Start: 2019-03-03 | End: 2019-03-05

## 2019-03-03 RX ADMIN — Medication 1000 MCG: at 09:03

## 2019-03-03 RX ADMIN — GABAPENTIN 300 MG: 300 CAPSULE ORAL at 09:02

## 2019-03-03 RX ADMIN — PIPERACILLIN SODIUM,TAZOBACTAM SODIUM 3.38 G: 3; .375 INJECTION, POWDER, FOR SOLUTION INTRAVENOUS at 23:06

## 2019-03-03 RX ADMIN — FOLIC ACID 1 MG: 1 TABLET ORAL at 09:03

## 2019-03-03 RX ADMIN — PIPERACILLIN SODIUM,TAZOBACTAM SODIUM 3.38 G: 3; .375 INJECTION, POWDER, FOR SOLUTION INTRAVENOUS at 16:24

## 2019-03-03 RX ADMIN — THERA TABS 1 TABLET: TAB at 09:03

## 2019-03-03 RX ADMIN — SODIUM CHLORIDE: 9 INJECTION, SOLUTION INTRAVENOUS at 09:21

## 2019-03-03 RX ADMIN — RANITIDINE 150 MG: 150 TABLET ORAL at 09:03

## 2019-03-03 RX ADMIN — Medication 100 MG: at 09:03

## 2019-03-03 RX ADMIN — PIPERACILLIN SODIUM,TAZOBACTAM SODIUM 3.38 G: 3; .375 INJECTION, POWDER, FOR SOLUTION INTRAVENOUS at 09:27

## 2019-03-03 RX ADMIN — VANCOMYCIN HYDROCHLORIDE 2000 MG: 5 INJECTION, POWDER, LYOPHILIZED, FOR SOLUTION INTRAVENOUS at 10:31

## 2019-03-03 RX ADMIN — ACETAMINOPHEN 650 MG: 325 TABLET, FILM COATED ORAL at 09:03

## 2019-03-03 RX ADMIN — ACETAMINOPHEN 650 MG: 325 TABLET, FILM COATED ORAL at 21:13

## 2019-03-03 RX ADMIN — CARVEDILOL 3.12 MG: 3.12 TABLET, FILM COATED ORAL at 09:03

## 2019-03-03 RX ADMIN — POLYETHYLENE GLYCOL 3350 17 G: 17 POWDER, FOR SOLUTION ORAL at 09:02

## 2019-03-03 RX ADMIN — GABAPENTIN 300 MG: 300 CAPSULE ORAL at 21:14

## 2019-03-03 ASSESSMENT — ACTIVITIES OF DAILY LIVING (ADL)
ADLS_ACUITY_SCORE: 32
ADLS_ACUITY_SCORE: 31
ADLS_ACUITY_SCORE: 31
ADLS_ACUITY_SCORE: 29
ADLS_ACUITY_SCORE: 29
ADLS_ACUITY_SCORE: 31

## 2019-03-03 NOTE — CONSULTS
Care Transition Initial Assessment - SW     Met with: patients miguel Malik  Principal Problem:    Traumatic hematoma of buttock  Active Problems:    Alcohol abuse    Alcoholic cirrhosis of liver (H)    Hyperkalemia    Benign essential hypertension    CAD, S/P CABG in 2007    Aortic stenosis    Pulmonary hypertension (H)    Paroxysmal atrial fibrillation (H)    S/P Left BKA 2017    Acute blood loss anemia    CRF (chronic renal failure), stage 3 (moderate) (H)       DATA  Lives With: spouse   Living Arrangements: apartment  Quality of Family Relationships: involved, supportive  Description of Support System: Supportive, Involved  Who is your support system?: Partner  Support Assessment: Adequate family and caregiver support.   Identified issues/concerns regarding health management: Pt was admitted to [facility] due to traumatic hematoma of buttock. Pt resides with wife and is open to [facility] PT. Per therapy recommendations TCU placement. Miguel Malik would like referrals sent to facilities close to home, [facility].   Quality of Family Relationships: involved, supportive  Transportation Anticipated: family or friend will provide    ASSESSMENT  Cognitive Status:  Did not observe patient.   Concerns to be addressed: discharge palcement .     PLAN  Financial costs for the patient includesTBD.  Patient given options and choices for discharge YES.  Patient/family is agreeable to the plan?  YES.  Patient Goals and Preferences: discharge to TCU.  Patient anticipates discharging to: Discharge to TCU .    FAHEEM sent referrals via [facility] to [facility].    TREMAYNE Ghosh

## 2019-03-03 NOTE — PROGRESS NOTES
Alert. Oriented x1. VSS. RA. T/R. Incontinent at times; urinal at bedside also. Mepilex on BLE; CDI. Regular diet. Hgb 7.4. LBKA. Dischage to TCU today.

## 2019-03-03 NOTE — PHARMACY-VANCOMYCIN DOSING SERVICE
Pharmacy Vancomycin Initial Note  Date of Service March 3, 2019  Patient's  1945  73 year old, male    Indication: Sepsis    Current estimated CrCl = Estimated Creatinine Clearance: 45.2 mL/min (A) (based on SCr of 1.81 mg/dL (H)).    Creatinine for last 3 days  2019:  4:25 PM Creatinine 1.96 mg/dL  3/1/2019: 12:10 PM Creatinine 1.84 mg/dL  3/2/2019:  7:20 AM Creatinine 1.83 mg/dL  3/3/2019:  9:07 AM Creatinine 1.81 mg/dL    Recent Vancomycin Level(s) for last 3 days  No results found for requested labs within last 72 hours.      Vancomycin IV Administrations (past 72 hours)                   vancomycin (VANCOCIN) 2,000 mg in sodium chloride 0.9 % 500 mL intermittent infusion (mg) 2,000 mg New Bag 19 1031                Nephrotoxins and other renal medications (From now, onward)    Start     Dose/Rate Route Frequency Ordered Stop    19 0930  vancomycin (VANCOCIN) 2,000 mg in sodium chloride 0.9 % 500 mL intermittent infusion      2,000 mg  over 2 Hours Intravenous ONCE 19 0920      19 0920  vancomycin place farmer - receiving intermittent dosing      1 each Intravenous SEE ADMIN INSTRUCTIONS 19 0920      19 0915  piperacillin-tazobactam (ZOSYN) 3.375 g vial to attach to  mL bag     Comments:  Pharmacy can adjust dose based on renal function.    3.375 g  over 30 Minutes Intravenous EVERY 6 HOURS 19 0908            Contrast Orders - past 72 hours (72h ago, onward)    Start     Dose/Rate Route Frequency Ordered Stop    19 1530  perflutren diluted 1mL to 2mL with saline (OPTISON) diluted injection 3 mL      3 mL Intravenous ONCE 19 1520 19 1521                Plan:  1.  Start vancomycin  2000 mg IV x 1 dose and then dosed intermittently until renal function stable  2.  Goal Trough Level: 15-20 mg/L   3.  Pharmacy will check trough levels as appropriate in 1-3 Days.    4. Serum creatinine levels will be ordered daily for the first week of  therapy and at least twice weekly for subsequent weeks.    5. Tripp method utilized to dose vancomycin therapy: Method 2    Tiana Barron

## 2019-03-03 NOTE — PLAN OF CARE
Pt arouses to voice and turns. Very lethargic and slow to respond. VSS with soft pressures. Oral meds held d/t level of alertness. On RA. Encouraged fluids, but pt too sleepy to drink much, declined dinner. Will need assistance to order and feed. Voiding via urinal when offered with turns. Turned Q2hr and as needed. LLE elevated. Dressing intact. IV SL. Lift used for transferring. Bed alarm for safety. Rounded on freq.

## 2019-03-03 NOTE — PROVIDER NOTIFICATION
MD Notification    Notified Person: MD    Notified Person Name: Mykel     Notification Date/Time: 0900 3/3/2019    Notification Interaction: web page     Purpose of Notification: temp  102.5 orally.     Orders Received: BC x2, UA/UC, CXR, started on IV abx.     Comments:

## 2019-03-03 NOTE — PROGRESS NOTES
[facility]    HOSPITALIST PROGRESS NOTE :   --------------------------------------------------    Date of Admission:  2/28/2019    Cumulative Summary: Antonio Ramirez is a 73 year old male with history of chronic anemia, alcoholic liver cirrhosis, heart failure with preserved ejection fraction, chronic kidney disease who was admitted on February 28, 2019 with buttock pain and was found to have left leg hematoma in the setting of anticoagulation in the picture of fall at home.  He was also found to have supratherapeutic INR and required reversal on admission and was also evaluated by surgery.    Assessment & Plan   Left leg hematoma in the setting of anticoagulation  Fall at home likely mechanical in the setting of alcohol abuse  Supratherapeutic INR in the setting of anticoagulation/alcohol use status post reversal.  Physical deconditioning due to acute illness/chronic debility.  Patient brought into the ED via EMS for buttock pain, had a fall 10 days ago.  Has had third visit to the ED for weakness related to this. Has mostly been bedbound since the last 10 days and would [facility].  Patient admits to been drinking around 4-5 drinks of vodka a day.  Last drink on night prior to admission  On exam bruising left buttock area, left thigh consistent with extremity contusion/hematoma.    Ultrasound of lower extremity venous duplex left leg showed no evidence of DVT.  CT left hip showed multiple intramuscular hematomas throughout the left lower extremity.  Presented with INR of 7.45, had vitamin K for reversal    Surgery consult requested for left leg hematoma.  Wound care consult requested for skin tears on hematoma.    --Patient was evaluated by general surgery who went in agreement with reversing of INR and possible vascular medicine consultation given extensive peripheral vascular disease and new left lower extremity edema with no DVT noted on ultrasound.  --During this  admission patient underwent CT scan of the left hip without contrast which showed multiple intramuscular hematomas throughout the left lower extremity most conspicuously involving the gluteus musculature and posterior compartment.  There was also diffuse edema and swelling throughout the visualized left upper extremity.  --X-ray of the lumbar spine showed diffuse intervertebral disc space narrowing throughout the lumbar spine.  --MRI of the lumbar spine was also done yesterday which showed multilevel degenerative disc and facet disease most advanced at L4-L5 level where there is severe central canal stenosis and moderate bilateral neural foraminal stenosis there is also moderate to severe bilateral neural foraminal stenosis at L5-S1.  -- Continue PRN oxycodone for mild to moderate pain.  IV Dilaudid for severe pain.    --Prn warm compress. Fall precautions.  -- currently patient is off AC due to multiple falls , aware about risk of stroke while he is off AC.  -- appreciate general surgery help, who are ok with patient being discharged from their point of view  --Patient has been evaluated by physical and Occupational Therapy is recommended to be discharged to transitional care unit  are following.    New onset fever: Patient was noticed to have fever of 102.5 this morning with blood pressure of 104/52 and saturating 94% on room air.  Patient was evaluated urgently this morning.  Patient is denying any cough or any urinary burning and frequency.  Patient does not have any new rash.  Patient does have back wound which is covered in Mepilex and on my examination it does not seem to be infected, picture is taken     --At this time patient is not ready for discharge due to new onset fever.  Start patient on gentle IV fluids, will place holding parameters on blood pressure medication.  --We will check CBC, BMP and pro-calcitonin, results were reviewed, patient has normal leukocyte, his pro calcitonin is  elevated for moderate risk for systemic infection, his electrolytes are in normal range, he continues to have elevated creatinine during this admission which has not improved.  --Urine analysis was also done which is showing 23 WBC counts and moderate amount of leukocyte esterase, urine culture is pending.  --2 sets of blood cultures are ordered, will follow up on the results.  --Chest x-ray was also done, I have personally reviewed the x-ray which is concerning for right lower lobe pneumonia, at this time as patient is new to me I cannot completely rule out aspiration pneumonia either.  --Initially patient was started on IV vancomycin and IV Zosyn this morning after drawing the cultures, after reviewing the chest x-ray I will discontinue IV vancomycin and will continue IV Zosyn to cover him from aspiration pneumonia point of view.  --I will also post speech therapy to evaluate if patient is at risk for aspiration.     Acute on chronic anemia likely from hematoma/dilutional with macrocytosis  Baseline hemoglobin between 7.5-8.5.  Presented with a hemoglobin of 8.3, received 1 unit blood transfusion with which hemoglobin this morning at 7.5    --We will transfuse if hemoglobin less than 7 or symptomatic.  --We will hold off anemia workup at this time as patient has already received blood transfusion.  --We will start on vitamin B12 supplements given macrocytosis/alcohol use  -- Hold Anticoagulation at this time       History of alcohol abuse:Drinks approx 1/3rd bottle of vodka a day at home  No withdrawal symptoms on this past hospital admission  Alcoholic liver cirrhosis with ascites, portal hypertension.    --CIWA scoring with PRN IV/p.o. Ativan.  --Started on thiamine, multivitamin, folic acid supplements.  --We will monitor electrolytes including potassium and magnesium closely.  --Advised patient to abstain from alcohol.  --Will need psychiatry, , chemical dependency evaluation once medically  stable.     Paroxysmal atrial fibrillation on chronic anticoagulation: Patient denies any chest pain or palpitation.    --Continue PTA Coreg.  --PTA Xarelto/aspirin on hold given hematomas and recurrent falls , not sure if he is a good candidate for AC in the picture of on going alcoholism and multiple falls ,he also have history of subdural hematomas in the past and now presenting with gluteal hematoma from fall , will recommend holding AC and considering to restart once patient fall risk is decreased and he is sober.    Antiphospholipid Antibody Syndrome (AAS), IVC filter in place, on chronic anticoagulation:  History of Pulmonary Embolism, Deep Vein Thrombosis:   --PTA Xarelto, aspirin on hold today given supratherapeutic INR, will need to revisit discussion on risks and benefits of anticoagulation in patient with recurrent falls.     History of HFrEF, EF 40-45%.  History of cardiomyopathy.   Mild valvular aortic stenosis, moderate mitral regurgitation, mild to moderate tricuspid regurgitation, mild ascending aorta dilatation.  Coronary artery disease s/p 3-v CABG.  Thoracic aortic aneurysm without rupture.  Troponin on admission 0.030. EKG showed atrial fibrillation with slow ventricular response, heart rate 57, .  Nonspecific ST-T wave changes.  Patient does not have specific complaint of chest discomfort, has been having frequent falls and is very deconditioned and barely able to move for last 10 days.  We will start on telemetry monitoring.  Echocardiogram for evaluation of heart function/valvular abnormalities.    --Continue PTA Coreg.  --Hold PTA torsemide given acute on chronic kidney injury.  --Holding PTA ASA, warfarin as above  --Not on statin PTA due to intolerance       Acute on chronic kidney injury likely prerenal.  Chronic kidney disease, stage 3.: Baseline creatinine between 1.2-1.4.   History of ANSELMO requiring HD in 2010  Presented with creatinine of 1.96 -hold PTA torsemide.    --Gentle  hydration with NS at 100 mL/h is started today due to the concern for PNA  --Avoid nephrotoxic drugs, monitor renal function a.m.     History of for subdural hematoma status post craniotomy 7/2018  Patient had a mechanical fall in the setting of anticoagulation, had subdural hematoma and had craniotomy and evacuation.  Then anticoagulation was on hold for a few weeks and later restarted.     Hyperlipidemia.  --Does not tolerate statin therapy, not on PTA medication for above diagnosis;      Essential hypertension.  --Continue PTA Coreg.  --PTA torsemide on hold.     Gastroesophageal reflux disease.Not on any PTA medications for above diagnosis, .  --Ranitidine 150 mg twice daily started.      Low grade B cell lymphoproliferative disorder.  History of prostate cancer s/p radiative seed implants.  -- No acute issues,       Peripheral polyneuropathy.  --PTA Gabapentin 600mg PO BID reduced to 300 mg twice daily given renal injury.     Pressure injury coccyx.  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  Stage I pressure injury to the sacrum  --Wound care team consulted.  Appreciate recommendation.     Diet: Combination Diet Regular Diet Adult  Snacks/Supplements Adult: Boost Plus; Between Meals  Room Service    Levin Catheter: not present  DVT Prophylaxis: Pneumatic Compression Devices and Ambulate every shift  Code Status: Full Code    The patient's care was discussed with the Bedside Nurse and Patient.    Disposition Plan   Expected discharge: will hold discharge today, might need to stay her tomorrow also, might be ready to get switched to oral antibiotics on Tuesday morning if remains afebrile     Entered: Marieyl Hogue MD 03/03/2019, 10:36 AM    Mariely Hogue MD, FACP  Text Page (7am - 6pm)    ----------------------------------------------------------------------------------------------------------------------      Interval History   Patient care was assumed this morning, patient was seen and examined.  I did receive the page  earlier this morning that patient has developed new onset fever, patient is denying any coughing, urinary urgency frequency burning and new rash at this point.  He continues to have wounds on his lower extremities and in the mid back which were reviewed on my examination.    -Data reviewed today: I reviewed all new labs and imaging results over the last 24 hours.    I personally reviewed the chest xray image(s) showing RLL PNA..    Physical Exam   Temp: 99.7  F (37.6  C) Temp src: Oral BP: 104/52   Heart Rate: 66 Resp: 20 SpO2: 94 % O2 Device: None (Room air)    Vitals:    02/28/19 1536   Weight: 90.7 kg (200 lb)     Vital Signs with Ranges  Temp:  [97.4  F (36.3  C)-102.5  F (39.2  C)] 99.7  F (37.6  C)  Heart Rate:  [66-73] 66  Resp:  [18-20] 20  BP: (103-113)/(52-61) 104/52  SpO2:  [92 %-94 %] 94 %  I/O last 3 completed shifts:  In: 944 [P.O.:540; I.V.:404]  Out: 675 [Urine:675]    GENERAL: Alert , awake and oriented. NAD. Conversational, appropriate.elderly, looks chronically sick  HEENT: Normocephalic. EOMI. No icterus or injection. Nares normal.   LUNGS: Clear to auscultation. No dyspnea at rest.   HEART: Regular rate. Extremities perfused.   ABDOMEN: Soft, nontender, and nondistended. Positive bowel sounds.   EXTREMITIES: No LE edema noted. Multiple brusies on the leg and buttock area   NEUROLOGIC: Moves extremities x4 on command. No acute focal neurologic abnormalities noted.     Medications     sodium chloride 100 mL/hr at 03/03/19 0921       carvedilol  3.125 mg Oral BID w/meals     cyanocobalamin  1,000 mcg Sublingual Daily     folic acid  1 mg Oral Daily     gabapentin  300 mg Oral BID     multivitamin, therapeutic  1 tablet Oral Daily     piperacillin-tazobactam  3.375 g Intravenous Q6H     ranitidine  150 mg Oral Daily     vancomycin (VANCOCIN) IV  2,000 mg Intravenous Once     vancomycin place farmer - receiving intermittent dosing  1 each Intravenous See Admin Instructions     vitamin B1  100 mg Oral  Daily       Data   Recent Labs   Lab 03/03/19  0916 03/03/19  0907 03/02/19  0720 03/01/19  1210 02/28/19  1625   WBC 8.2  --  10.3 6.5 6.8   HGB 7.5*  --  7.4* 7.1* 6.3*     --  102* 101* 101*     --  289 229 229   INR  --   --  3.16* 3.85* 7.45*   NA  --  134 133 132* 131*   POTASSIUM  --  5.3 5.6* 5.0 5.4*   CHLORIDE  --  106 105 103 101   CO2  --  20 22 23 26   BUN  --  55* 59* 61* 65*   CR  --  1.81* 1.83* 1.84* 1.96*   ANIONGAP  --  8 6 6 4   BENNIE  --  8.5 8.5 8.0* 8.5   GLC  --  92 96 123* 111*   ALBUMIN  --   --   --   --  2.7*   PROTTOTAL  --   --   --   --  6.9   BILITOTAL  --   --   --   --  2.9*   ALKPHOS  --   --   --   --  90   ALT  --   --   --   --  11   AST  --   --   --   --  24   TROPI  --   --   --   --  0.030       Imaging:   Recent Results (from the past 24 hour(s))   MR Lumbar Spine w/o Contrast    Narrative    MR LUMBAR SPINE WITHOUT CONTRAST  3/2/2019 1:58 PM     HISTORY: Back pain, less than 6 weeks, no red flags, no prior  management.    TECHNIQUE: Multiplanar multisequence images were obtained through the  lumbar spine without contrast.    COMPARISON: None.    FINDINGS: Five lumbar-type vertebral bodies are presumed. Posterior  alignment is normal. There is moderate to severe disc space narrowing  throughout the lumbosacral spine. Discogenic marrow changes are  present at L4-L5. Incidental note is made of focal hemangiomas in the  L2, L3, and L4 vertebral bodies. Bone marrow signal intensity is  otherwise normal. The conus medullaris and cauda equina nerve roots  are bunched at the L4-L5 level due to stenosis but otherwise appear to  be within normal limits.    L1-L2: Mild facet hypertrophy and broad-based disc bulging is present  causing mild central canal stenosis but no neural foraminal stenosis.    L2-L3: Broad-based disc bulge and posterior osteophyte formation facet  hypertrophy is present causing mild central canal stenosis but no  significant neural foraminal  stenosis.    L3-L4: Broad-based posterior osteophyte formation, disc bulge and  facet hypertrophy is present causing mild to moderate central canal  stenosis but no significant neural foraminal stenosis.    L4-L5: There is broad-based disc protrusion and severe facet and  ligamentum flavum hypertrophy causing severe central canal stenosis  and moderate bilateral neural foraminal stenosis.    L5-S1: Moderate facet hypertrophy and broad-based disc bulge or disc  protrusion is present causing some mild central and moderate to severe  bilateral neural foraminal stenosis.      Impression    IMPRESSION: Multilevel degenerative disc and facet disease most  advanced at L4-L5 where there is severe central canal stenosis and  moderate bilateral neural foraminal stenosis. There is also moderate  to severe bilateral neural foraminal stenosis at L5-S1.    ANSELMO MCNAMARA MD   XR Chest Port 1 View    Narrative    CHEST ONE VIEW PORTABLE   3/3/2019 9:40 AM     HISTORY:  Fever.    COMPARISON: 7/22/2018.      Impression    IMPRESSION: Small right pleural effusion with associated infiltrate  and/or atelectasis at the right lung base, new since the previous  exam. Right lung base pneumonia could have this appearance. The left  lung is clear. No pneumothorax. Prior midline sternotomy. Aortic  calcification. Heart size appears stable. Mild pulmonary venous  congestion has increased slightly.

## 2019-03-03 NOTE — PLAN OF CARE
Care Plan Summary Note: Tmax 102.5 orally, pt lethargic, alert to self and place. Moderate pain to left hip and lower back. MD aware, ordered CXR, UA/UC, BC x2, started on IV zoysn and vanco. CXR showed new LLL PNA. Abnormal UA, procal 0.77. MD updated of result. CX pending. Up with lift. Turn Q 2 with ax2. Dressing changed to LLE and mid back. Wound care to re consult in am for mid back open area. Poor appetite, but able to tolerate oral fluid with encouragement. No BM since admission, PRN miralax given. INC of urine at times, ues urinal with encouragement. Monitor.     1451: Wife updated over the phone.

## 2019-03-03 NOTE — PROGRESS NOTES
"[facility]  General Surgery Progress Note        Dony Oliva MD   03/03/2019        Interval History:      Pt feeling somewhat better.  Hgb stable.  Lumbar xrays okay         Assessment and Plan:      72 yo s/p fall at home, gluteal hematoma.  Okay for discharge from surgery standpoint.  Pt/ot                   Physical Exam:      Blood pressure 104/52, pulse 57, temperature 102.5  F (39.2  C), temperature source Oral, resp. rate 20, height 1.854 m (6' 1\"), weight 90.7 kg (200 lb), SpO2 94 %.  Vitals:    02/28/19 1536   Weight: 90.7 kg (200 lb)     Vital Signs with Ranges  Temp:  [97.4  F (36.3  C)-102.5  F (39.2  C)] 102.5  F (39.2  C)  Heart Rate:  [66-73] 66  Resp:  [18-20] 20  BP: (103-113)/(52-61) 104/52  SpO2:  [92 %-94 %] 94 %  I/O's Last 24 hours  I/O last 3 completed shifts:  In: 944 [P.O.:540; I.V.:404]  Out: 675 [Urine:675]    Constitutional: arouseable but sleepy   Lungs: Breathing comfortably   Cardiovascular: NSR   Abdomen: Soft, nontender   Skin: Warm, dry.  No signs of skin ischemia at hematoma   Imaging: Lumbar xrays without acute fracture          Medications:          carvedilol  3.125 mg Oral BID w/meals     cyanocobalamin  1,000 mcg Sublingual Daily     folic acid  1 mg Oral Daily     gabapentin  300 mg Oral BID     multivitamin, therapeutic  1 tablet Oral Daily     piperacillin-tazobactam  3.375 g Intravenous Q6H     ranitidine  150 mg Oral Daily     vancomycin (VANCOCIN) IV  2,000 mg Intravenous Once     vancomycin place farmer - receiving intermittent dosing  1 each Intravenous See Admin Instructions     vitamin B1  100 mg Oral Daily            Data:      All new lab and imaging data was reviewed.   Recent Labs   Lab Test 03/02/19  0720 03/01/19  1210 02/28/19  1625   WBC 10.3 6.5 6.8   HGB 7.4* 7.1* 6.3*   * 101* 101*    229 229   INR 3.16* 3.85* 7.45*             "

## 2019-03-03 NOTE — PROGRESS NOTES
SW:  D: followed up on referrals. [facility] is still looking at referral. [facility] left voicemail for admissions requesting call back. [facility] left voicemail requesting call back.  P: continue to follow.    TREMAYNE Ghosh    ADDENDUM    Krista has accepted pt. Requesting pt to come @ 1430. Faheem discussed with bedside nurse, pt diagnosed with pneumonia today 3/3, delay discharge TBD. FAHEEM updated krista. FAHEEM updated pt's wife.

## 2019-03-04 ENCOUNTER — APPOINTMENT (OUTPATIENT)
Dept: PHYSICAL THERAPY | Facility: CLINIC | Age: 74
DRG: 605 | End: 2019-03-04
Payer: MEDICARE

## 2019-03-04 LAB
CREAT SERPL-MCNC: 1.67 MG/DL (ref 0.66–1.25)
GFR SERPL CREATININE-BSD FRML MDRD: 39 ML/MIN/{1.73_M2}
VANCOMYCIN SERPL-MCNC: 13 MG/L

## 2019-03-04 PROCEDURE — 80202 ASSAY OF VANCOMYCIN: CPT | Performed by: INTERNAL MEDICINE

## 2019-03-04 PROCEDURE — 97530 THERAPEUTIC ACTIVITIES: CPT | Mod: GP

## 2019-03-04 PROCEDURE — 25000132 ZZH RX MED GY IP 250 OP 250 PS 637: Mod: GY | Performed by: STUDENT IN AN ORGANIZED HEALTH CARE EDUCATION/TRAINING PROGRAM

## 2019-03-04 PROCEDURE — 25000128 H RX IP 250 OP 636: Performed by: INTERNAL MEDICINE

## 2019-03-04 PROCEDURE — G0463 HOSPITAL OUTPT CLINIC VISIT: HCPCS | Performed by: NURSE PRACTITIONER

## 2019-03-04 PROCEDURE — 82565 ASSAY OF CREATININE: CPT | Performed by: INTERNAL MEDICINE

## 2019-03-04 PROCEDURE — 99233 SBSQ HOSP IP/OBS HIGH 50: CPT | Performed by: STUDENT IN AN ORGANIZED HEALTH CARE EDUCATION/TRAINING PROGRAM

## 2019-03-04 PROCEDURE — 25000132 ZZH RX MED GY IP 250 OP 250 PS 637: Mod: GY | Performed by: HOSPITALIST

## 2019-03-04 PROCEDURE — 25800030 ZZH RX IP 258 OP 636: Performed by: INTERNAL MEDICINE

## 2019-03-04 PROCEDURE — 25000132 ZZH RX MED GY IP 250 OP 250 PS 637: Mod: GY | Performed by: INTERNAL MEDICINE

## 2019-03-04 PROCEDURE — 12000000 ZZH R&B MED SURG/OB

## 2019-03-04 PROCEDURE — A9270 NON-COVERED ITEM OR SERVICE: HCPCS | Mod: GY | Performed by: INTERNAL MEDICINE

## 2019-03-04 PROCEDURE — 36415 COLL VENOUS BLD VENIPUNCTURE: CPT | Performed by: INTERNAL MEDICINE

## 2019-03-04 PROCEDURE — A9270 NON-COVERED ITEM OR SERVICE: HCPCS | Mod: GY | Performed by: HOSPITALIST

## 2019-03-04 PROCEDURE — A9270 NON-COVERED ITEM OR SERVICE: HCPCS | Mod: GY | Performed by: STUDENT IN AN ORGANIZED HEALTH CARE EDUCATION/TRAINING PROGRAM

## 2019-03-04 RX ORDER — GABAPENTIN 300 MG/1
300 CAPSULE ORAL ONCE
Status: COMPLETED | OUTPATIENT
Start: 2019-03-04 | End: 2019-03-04

## 2019-03-04 RX ADMIN — CARVEDILOL 3.12 MG: 3.12 TABLET, FILM COATED ORAL at 17:41

## 2019-03-04 RX ADMIN — THERA TABS 1 TABLET: TAB at 08:58

## 2019-03-04 RX ADMIN — PIPERACILLIN SODIUM,TAZOBACTAM SODIUM 3.38 G: 3; .375 INJECTION, POWDER, FOR SOLUTION INTRAVENOUS at 21:58

## 2019-03-04 RX ADMIN — PIPERACILLIN SODIUM,TAZOBACTAM SODIUM 3.38 G: 3; .375 INJECTION, POWDER, FOR SOLUTION INTRAVENOUS at 16:01

## 2019-03-04 RX ADMIN — RANITIDINE 150 MG: 150 TABLET ORAL at 08:58

## 2019-03-04 RX ADMIN — PIPERACILLIN SODIUM,TAZOBACTAM SODIUM 3.38 G: 3; .375 INJECTION, POWDER, FOR SOLUTION INTRAVENOUS at 04:32

## 2019-03-04 RX ADMIN — Medication 1 MG: at 20:25

## 2019-03-04 RX ADMIN — GABAPENTIN 300 MG: 300 CAPSULE ORAL at 08:58

## 2019-03-04 RX ADMIN — VANCOMYCIN HYDROCHLORIDE 2000 MG: 5 INJECTION, POWDER, LYOPHILIZED, FOR SOLUTION INTRAVENOUS at 01:53

## 2019-03-04 RX ADMIN — SODIUM CHLORIDE: 9 INJECTION, SOLUTION INTRAVENOUS at 00:50

## 2019-03-04 RX ADMIN — FOLIC ACID 1 MG: 1 TABLET ORAL at 08:58

## 2019-03-04 RX ADMIN — Medication 1000 MCG: at 08:58

## 2019-03-04 RX ADMIN — PIPERACILLIN SODIUM,TAZOBACTAM SODIUM 3.38 G: 3; .375 INJECTION, POWDER, FOR SOLUTION INTRAVENOUS at 09:01

## 2019-03-04 RX ADMIN — GABAPENTIN 300 MG: 300 CAPSULE ORAL at 20:25

## 2019-03-04 RX ADMIN — Medication 100 MG: at 08:58

## 2019-03-04 RX ADMIN — SODIUM CHLORIDE: 9 INJECTION, SOLUTION INTRAVENOUS at 13:50

## 2019-03-04 ASSESSMENT — ACTIVITIES OF DAILY LIVING (ADL)
ADLS_ACUITY_SCORE: 31
ADLS_ACUITY_SCORE: 30
ADLS_ACUITY_SCORE: 30
ADLS_ACUITY_SCORE: 31
ADLS_ACUITY_SCORE: 30
ADLS_ACUITY_SCORE: 31

## 2019-03-04 NOTE — PLAN OF CARE
Pt is A/O with forgetfulness. Lift assist R/T old L BKA. VSS. IVF and abx. Regular diet. Incont B&B at times. Plans to discharge to TCU on PO abx tomorrow as long as pt remains afebrile until tomorrow AM.

## 2019-03-04 NOTE — PROGRESS NOTES
Micaela called for 2nd positive blood culture of L Hand from this morning at 9:22AM. Gram + Cocci in clusters.     MD Garcia updated. He will review labs and order appropriate abx.     Micaela will call in about 2 hrs will finalized results.

## 2019-03-04 NOTE — PROVIDER NOTIFICATION
MD Notification    Notified Person: MD    Notified Person Name: Jose    Notification Date/Time: 3/4/19 0113    Notification Interaction: phone    Purpose of Notification: pos BC, resulted S. aureus    Orders Received: no new orders    Comments:

## 2019-03-04 NOTE — PROGRESS NOTES
[facility] Nurse Inpatient Wound Assessment      Assessment of wound(s) on pt's:   Lt posterior stump, buttock, mid-back         Data:   Patient History:     Per MD note, patient is a 73 year old male with hx of Atrial Fibrillation, CAD, CKD, PVD with LLE BKA and alcoholic cirrhosis. He fell at home about 2 weeks ago while drinking, with subsequent left thigh/buttock  hematoma and bruising.           Ottoniel Risk Assessment  Sensory Perception: 4-->no impairment    Moisture: 3-->occasionally moist   Activity: 2-->chairfast     Mobility: 3-->slightly limited   Nutrition: 3-->adequate   Ottoniel Score: 17     ? Positioning: Rt/Lt positioning with pillows  ? Mattress:  Atmos air       Moisture Management:  Incontinence protocol       Current Diet / Nutrition:     Combination Diet Regular Diet Adult        Labs:          Recent Labs   Lab Test 03/01/19  1210 02/28/19  1625   05/18/18  1641   ALBUMIN  --  2.7*   < >  --    HGB 7.1* 6.3*   < > 10.6*   INR 3.85* 7.45*   < >  --    WBC 6.5 6.8   < > 4.4   A1C  --   --   --  4.6   CRP 34.3*  --   --   --     < > = values in this interval not displayed.         Wound Assessment (location):   Lt posterior stump 3-1-19          Wound History:    LLE stump: 2+ edema with diffuse ecchymosis over entire stump.   Lt posterior stump:   ? Specific Dimensions (length x width x depth, in cm) :   2.8 cm x 2.4cm x0 .1cm  ? Tunneling:  N/A.Undermining: N/A  ? Palpation of the wound bed: firm edema  ? Slough appearance:  N/A  ? Eschar appearance:  N/A    Periwound Skin: scattered small superficial open areas    ? Color:scattered ecchymosis  ? Temperature  : normal  ? Drainage:  Scant serous    Odor: None    Pain:  None     Lt buttock, coccyx , Lt hip and Lt posterior thigh, Mid-back   Left buttock: Skin intact, not tender, hematoma  Coccyx/ Lt hip and Lt posterior thigh: skin intact with scattered ecchymosis, not a PI, from recent fall  Mid-back open area 3.4 x 1.4 x 0.1  cm, jagged pink tissue, shear                Intervention:     Patient's chart evaluated.      Wound(s) assessed    Wound Care: cleaned MicroKlenz                           Mepilex sacral                           No dressing to Lt buttock/back hematoma/ecchymosis    Orders : Reviewed    Supplies In supply room    Discussed plan of care with nurse and patient             Assessment:   Lt posterior stump:  Superficial wounds from edema and ecchymosis.  Coccyx and Lt buttock: ecchymosis and hematoma r/t fall, not pressure  Mid-back: Shearing, moisture         Plan:     Nursing to notify the Provider(s) and re-consult the WO Nurse if wounds deteriorate or if the wound care plan needs reevaluation.              Wound care: Lt posterior stump or any weeping areas on BLE         1. Change dressing odd days and prn         2.  Clean legs with MicroKlenz         3.  Apply sheet of Aqua cell Ag over wounds         4. Secure with Mepilex sacral         5.  Elevate Leg on pillow @ all times in bed         6.  Coccyx: Mepilex sacral for prevention.          7   No dressing required to hematoma Lt buttock, low back or Lt hip.      Wound Care: Mid-back:        1. Cleanse cleanse with Microklenz, pat dry.        2. Apply Cavilon 3m no-sting barrier film        3. Apply Mepilex sacral dressing every other day              WOC will return weekly

## 2019-03-04 NOTE — PLAN OF CARE
Pt arouses to voice. Alert to self. Slow to respond and disoriented to place, time, and situation. VSS with Tmax of 101.4, tylenol given. BP in the 100's/50's. On RA with diminished LS. Unable to perform breathing exercises. Poor appetite with assist for feeding, encouraging fluids freq. Able to swallow pills whole sitting upright and simple directions. Incont and using urinal when offered with turns. 1 incont BM this evening. Dressing on LLE WDL and elevated. +2 Edema in L hip and LLE. 2PA wit turns and lift used for transfers. [facility] called this evening with positive blood cultures on L and R limbs for Gram + Cocci in clusters. Hospitalists notified and Vanco level ordered for pharmacy to dose d/t elevated creatine levels. NS running at 100ml/hr. Bed alarm on for safety. Rounded of freq.

## 2019-03-04 NOTE — PROVIDER NOTIFICATION
Hospitalists Jose paged for positive blood cultures from the [facility].     Gram + Cocci in clusters from R kentrell this AM at 9:15.     Pt's last temp 101.4 at 2119

## 2019-03-04 NOTE — PROGRESS NOTES
[facility]    HOSPITALIST PROGRESS NOTE :   --------------------------------------------------    Date of Admission:  2/28/2019    Cumulative Summary: Antonio Ramirez is a 73 year old male with history of chronic anemia, alcoholic liver cirrhosis, heart failure with preserved ejection fraction, chronic kidney disease who was admitted on February 28, 2019 with buttock pain and was found to have left leg hematoma in the setting of anticoagulation in the picture of fall at home.  He was also found to have supratherapeutic INR and required reversal on admission and was also evaluated by surgery.    Assessment & Plan      Left leg hematoma in the setting of anticoagulation  Fall at home likely mechanical in the setting of alcohol abuse  Supratherapeutic INR in the setting of anticoagulation/alcohol use status post reversal.  Physical deconditioning due to acute illness/chronic debility.  Patient brought into the ED via EMS for buttock pain, had a fall 10 days ago.  Has had third visit to the ED for weakness related to this. Has mostly been bedbound since the last 10 days and would [facility].  Patient admits to been drinking around 4-5 drinks of vodka a day.  Last drink on night prior to admission  On exam bruising left buttock area, left thigh consistent with extremity contusion/hematoma.    Ultrasound of lower extremity venous duplex left leg showed no evidence of DVT.  CT left hip showed multiple intramuscular hematomas throughout the left lower extremity.  Presented with INR of 7.45, had vitamin K for reversal    Surgery consult requested for left leg hematoma.  Wound care consult requested for skin tears on hematoma.    --Patient was evaluated by general surgery who went in agreement with reversing of INR and possible vascular medicine consultation given extensive peripheral vascular disease and new left lower extremity edema with no DVT noted on ultrasound.  --During this  admission patient underwent CT scan of the left hip without contrast which showed multiple intramuscular hematomas throughout the left lower extremity most conspicuously involving the gluteus musculature and posterior compartment.  There was also diffuse edema and swelling throughout the visualized left upper extremity.  --X-ray of the lumbar spine showed diffuse intervertebral disc space narrowing throughout the lumbar spine.  --MRI of the lumbar spine was also done yesterday which showed multilevel degenerative disc and facet disease most advanced at L4-L5 level where there is severe central canal stenosis and moderate bilateral neural foraminal stenosis there is also moderate to severe bilateral neural foraminal stenosis at L5-S1.  -- Continue PRN oxycodone for mild to moderate pain.  IV Dilaudid for severe pain.    -- Prn warm compress. Fall precautions.  -- currently patient is off AC due to multiple falls , aware about risk of stroke while he is off AC.  -- appreciate general surgery help, who are ok with patient being discharged from their point of view  --Patient has been evaluated by physical and Occupational Therapy is recommended to be discharged to transitional care unit  are following.    Fever  PNA   Assessment: Patient was noticed to have fever of 102.5 this morning with blood pressure of 104/52 and saturating 94% on room air.  Patient was evaluated urgently this morning.  Patient is denying any cough or any urinary burning and frequency.  Patient does not have any new rash. UA showed 23 WBC counts and moderate amount of leukocyte esterase, urine culture is pending.Chest x-ray concerning for right lower lobe pneumonia possible aspiration?  Plan:    --Initially patient was started on IV vancomycin and IV Zosyn. Now on Zosyn alone  -- SLP to evaluate aspiration risk.       Acute on chronic anemia likely from hematoma/dilutional with macrocytosis  Baseline hemoglobin between  7.5-8.5.  Presented with a hemoglobin of 8.3, received 1 unit blood transfusion with which hemoglobin this morning at 7.5    --We will transfuse if hemoglobin less than 7 or symptomatic.  --We will hold off anemia workup at this time as patient has already received blood transfusion.  --We will start on vitamin B12 supplements given macrocytosis/alcohol use  -- Hold Anticoagulation at this time       History of alcohol abuse  Alcoholic liver cirrhosis with ascites, portal hypertension.  Assessment: Drinks approx 1/3rd bottle of vodka a day at home. No withdrawal symptoms on this past hospital admission  Plan:  --CIWA protocol now stopped.   --Started on thiamine, multivitamin, folic acid supplements.  --We will monitor electrolytes including potassium and magnesium closely.  --Advised patient to abstain from alcohol.     Paroxysmal atrial fibrillation on chronic anticoagulation: Patient denies any chest pain or palpitation.    --Continue PTA Coreg.  --PTA Xarelto/aspirin on hold given hematomas and recurrent falls , not sure if he is a good candidate for AC in the picture of on going alcoholism and multiple falls ,he also have history of subdural hematomas in the past and now presenting with gluteal hematoma from fall , will recommend holding AC and considering to restart once patient fall risk is decreased and he is sober.     Antiphospholipid Antibody Syndrome (AAS), IVC filter in place, on chronic anticoagulation:  History of Pulmonary Embolism, Deep Vein Thrombosis:   --PTA Xarelto, aspirin on hold today given supratherapeutic INR, will need to revisit discussion on risks and benefits of anticoagulation in patient with recurrent falls.     History of HFrEF, EF 40-45%.  History of cardiomyopathy.   Mild valvular aortic stenosis, moderate mitral regurgitation, mild to moderate tricuspid regurgitation, mild ascending aorta dilatation.  Coronary artery disease s/p 3-v CABG.  Thoracic aortic aneurysm without  rupture.  Assessement: Troponin on admission 0.030. EKG showed atrial fibrillation with slow ventricular response, heart rate 57, .  Nonspecific ST-T wave changes.  Patient does not have specific complaint of chest discomfort, has been having frequent falls and is very deconditioned and barely able to move for last 10 days.  Echocardiogram for evaluation of heart function/valvular abnormalities showed no significant changes compared to previous. See  Report below  Plan:  --Continue PTA Coreg.  --Hold PTA torsemide given acute on chronic kidney injury.  --Holding PTA ASA, warfarin as above  --Not on statin PTA due to intolerance       Acute on chronic kidney injury likely prerenal.  Chronic kidney disease, stage 3.: Baseline creatinine between 1.2-1.4.   History of ANSELMO requiring HD in 2010  Presented with creatinine of 1.96 -hold PTA torsemide.  Plan:   --Gentle hydration with NS at 100 mL/h is started today due to the concern for PNA  --Avoid nephrotoxic drugs, monitor renal function a.m.     History of for subdural hematoma status post craniotomy 7/2018  Patient had a mechanical fall in the setting of anticoagulation, had subdural hematoma and had craniotomy and evacuation.  Then anticoagulation was on hold for a few weeks and later restarted.     Hyperlipidemia.  --Does not tolerate statin therapy, not on PTA medication for above diagnosis;      Essential hypertension.  --Continue PTA Coreg.  --PTA torsemide on hold.     Gastroesophageal reflux disease.Not on any PTA medications for above diagnosis, .  --Ranitidine 150 mg twice daily started.      Low grade B cell lymphoproliferative disorder.  History of prostate cancer s/p radiative seed implants.  -- No acute issues,       Peripheral polyneuropathy.  --PTA Gabapentin 600mg PO BID reduced to 300 mg twice daily given renal injury. Give additional 300 mg today once. Check Cr in AM.      Pressure injury coccyx.  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  Stage I  pressure injury to the sacrum  --Wound care team consulted.  Appreciate recommendation.     Diet: Combination Diet Regular Diet Adult  Snacks/Supplements Adult: Boost Plus; Between Meals  Room Service    Levin Catheter: not present  DVT Prophylaxis: Pneumatic Compression Devices and Ambulate every shift  Code Status: Full Code    The patient's care was discussed with the Bedside Nurse and Patient.    Disposition Plan   Expected discharge: will hold discharge today, anticipate discontinue Tuesday morning if remains afebrile     Entered: William Shaffer MD 03/04/2019, 10:57 AM       Patient, family, interdisciplinary team involved in care and agrees with plan.  Total time - Greater than 35 min. More than 50% of time spent in direct patient care, care coordination, patient/caregiver counseling, and formalizing plan of care.     William Shaffer MD  Text Page (7am - 6pm)    ----------------------------------------------------------------------------------------------------------------------      Interval History      patient was seen and examined.  Had fever last evening, none since and continues denying any coughing, urinary urgency frequency burning and new rash.  Having phantom pain that is worsened  No new complaints otherwise  Patient's wife at bedside.     -Data reviewed today: I reviewed all new labs and imaging results over the last 24 hours.    I personally reviewed the chest xray image(s) showing RLL PNA..    Physical Exam   Temp: 98.9  F (37.2  C) Temp src: Axillary BP: 91/53 Pulse: 68 Heart Rate: 63 Resp: 18 SpO2: 97 % O2 Device: None (Room air)    Vitals:    02/28/19 1536   Weight: 90.7 kg (200 lb)     Vital Signs with Ranges  Temp:  [98.9  F (37.2  C)-101.4  F (38.6  C)] 98.9  F (37.2  C)  Pulse:  [66-68] 68  Heart Rate:  [63-77] 63  Resp:  [16-18] 18  BP: ()/(53-62) 91/53  SpO2:  [95 %-97 %] 97 %  I/O last 3 completed shifts:  In: 2590 [P.O.:640; I.V.:1950]  Out: 315 [Urine:315]    GENERAL: Alert , awake and  oriented. NAD. Conversational, appropriate.elderly, looks chronically sick  HEENT: Normocephalic. EOMI. No icterus or injection. Nares normal.   LUNGS: Clear to auscultation. No dyspnea at rest.   HEART: Regular rate. Extremities perfused.   ABDOMEN: Soft, nontender, and nondistended. Positive bowel sounds.   EXTREMITIES: No LE edema noted. Multiple brusies on the leg and buttock area   NEUROLOGIC: Moves extremities x4 on command. No acute focal neurologic abnormalities noted.     Medications     sodium chloride 100 mL/hr at 03/04/19 0050       carvedilol  3.125 mg Oral BID w/meals     cyanocobalamin  1,000 mcg Sublingual Daily     folic acid  1 mg Oral Daily     gabapentin  300 mg Oral BID     multivitamin, therapeutic  1 tablet Oral Daily     piperacillin-tazobactam  3.375 g Intravenous Q6H     ranitidine  150 mg Oral Daily     vitamin B1  100 mg Oral Daily       Data   Recent Labs   Lab 03/03/19  0916 03/03/19  0907 03/02/19  0720 03/01/19  1210 02/28/19  1625   WBC 8.2  --  10.3 6.5 6.8   HGB 7.5*  --  7.4* 7.1* 6.3*     --  102* 101* 101*     --  289 229 229   INR  --   --  3.16* 3.85* 7.45*   NA  --  134 133 132* 131*   POTASSIUM  --  5.3 5.6* 5.0 5.4*   CHLORIDE  --  106 105 103 101   CO2  --  20 22 23 26   BUN  --  55* 59* 61* 65*   CR  --  1.81* 1.83* 1.84* 1.96*   ANIONGAP  --  8 6 6 4   BENNIE  --  8.5 8.5 8.0* 8.5   GLC  --  92 96 123* 111*   ALBUMIN  --   --   --   --  2.7*   PROTTOTAL  --   --   --   --  6.9   BILITOTAL  --   --   --   --  2.9*   ALKPHOS  --   --   --   --  90   ALT  --   --   --   --  11   AST  --   --   --   --  24   TROPI  --   --   --   --  0.030       Imaging:   No results found for this or any previous visit (from the past 24 hour(s)).

## 2019-03-04 NOTE — PLAN OF CARE
Discharge Planner PT   Patient plan for discharge: Rehab  Current status: Pt received supine in bed, agreeable to therapy with encouragement. Pt transitions supine to sit with mod assist x 2. Once sitting at EOB, pt tolerates seated position x 10 minutes with SBA to min assist, has tendency to lean back and towards the right, requires consistent VCs for safety. Pt trials sit to/from stand x 3 reps with max assist x 2, unable to stand completely erect. Pt without prosthesis this am. Overhead lift utilized to transfer patient EOB to chair.   Barriers to return to prior living situation: Decreased activity tolerance, level of assist, fall risk  Recommendations for discharge: TCU  Rationale for recommendations: Pt will benefit from continued therapy to address impairments and increase mobility and functional independence prior to returning home.        Entered by: Pau Ruiz 03/04/2019 8:53 AM

## 2019-03-05 ENCOUNTER — APPOINTMENT (OUTPATIENT)
Dept: SPEECH THERAPY | Facility: CLINIC | Age: 74
DRG: 605 | End: 2019-03-05
Payer: MEDICARE

## 2019-03-05 LAB
ANION GAP SERPL CALCULATED.3IONS-SCNC: 6 MMOL/L (ref 3–14)
BACTERIA SPEC CULT: ABNORMAL
BUN SERPL-MCNC: 42 MG/DL (ref 7–30)
CALCIUM SERPL-MCNC: 8 MG/DL (ref 8.5–10.1)
CHLORIDE SERPL-SCNC: 109 MMOL/L (ref 94–109)
CO2 SERPL-SCNC: 19 MMOL/L (ref 20–32)
CREAT SERPL-MCNC: 1.57 MG/DL (ref 0.66–1.25)
GFR SERPL CREATININE-BSD FRML MDRD: 42 ML/MIN/{1.73_M2}
GLUCOSE SERPL-MCNC: 94 MG/DL (ref 70–99)
Lab: ABNORMAL
POTASSIUM SERPL-SCNC: 4.4 MMOL/L (ref 3.4–5.3)
SODIUM SERPL-SCNC: 134 MMOL/L (ref 133–144)
SPECIMEN SOURCE: ABNORMAL

## 2019-03-05 PROCEDURE — 25000132 ZZH RX MED GY IP 250 OP 250 PS 637: Mod: GY | Performed by: HOSPITALIST

## 2019-03-05 PROCEDURE — 92526 ORAL FUNCTION THERAPY: CPT | Mod: GN | Performed by: SPEECH-LANGUAGE PATHOLOGIST

## 2019-03-05 PROCEDURE — 12000000 ZZH R&B MED SURG/OB

## 2019-03-05 PROCEDURE — 36415 COLL VENOUS BLD VENIPUNCTURE: CPT | Performed by: STUDENT IN AN ORGANIZED HEALTH CARE EDUCATION/TRAINING PROGRAM

## 2019-03-05 PROCEDURE — 25000128 H RX IP 250 OP 636: Performed by: INTERNAL MEDICINE

## 2019-03-05 PROCEDURE — A9270 NON-COVERED ITEM OR SERVICE: HCPCS | Mod: GY | Performed by: HOSPITALIST

## 2019-03-05 PROCEDURE — 25000132 ZZH RX MED GY IP 250 OP 250 PS 637: Mod: GY | Performed by: INTERNAL MEDICINE

## 2019-03-05 PROCEDURE — 92610 EVALUATE SWALLOWING FUNCTION: CPT | Mod: GN | Performed by: SPEECH-LANGUAGE PATHOLOGIST

## 2019-03-05 PROCEDURE — 80048 BASIC METABOLIC PNL TOTAL CA: CPT | Performed by: STUDENT IN AN ORGANIZED HEALTH CARE EDUCATION/TRAINING PROGRAM

## 2019-03-05 PROCEDURE — 99233 SBSQ HOSP IP/OBS HIGH 50: CPT | Performed by: STUDENT IN AN ORGANIZED HEALTH CARE EDUCATION/TRAINING PROGRAM

## 2019-03-05 PROCEDURE — A9270 NON-COVERED ITEM OR SERVICE: HCPCS | Mod: GY | Performed by: INTERNAL MEDICINE

## 2019-03-05 PROCEDURE — 36415 COLL VENOUS BLD VENIPUNCTURE: CPT | Performed by: INTERNAL MEDICINE

## 2019-03-05 PROCEDURE — 87040 BLOOD CULTURE FOR BACTERIA: CPT | Performed by: INTERNAL MEDICINE

## 2019-03-05 PROCEDURE — 25800030 ZZH RX IP 258 OP 636: Performed by: INTERNAL MEDICINE

## 2019-03-05 RX ORDER — LANOLIN ALCOHOL/MO/W.PET/CERES
3 CREAM (GRAM) TOPICAL
Status: DISCONTINUED | OUTPATIENT
Start: 2019-03-05 | End: 2019-03-14 | Stop reason: HOSPADM

## 2019-03-05 RX ORDER — TORSEMIDE 10 MG/1
10 TABLET ORAL DAILY
Qty: 30 TABLET | Refills: 0 | DISCHARGE
Start: 2019-03-05 | End: 2019-03-14

## 2019-03-05 RX ORDER — OXYCODONE HYDROCHLORIDE 5 MG/1
5 TABLET ORAL EVERY 6 HOURS PRN
Qty: 10 TABLET | Refills: 0 | Status: SHIPPED | OUTPATIENT
Start: 2019-03-05 | End: 2019-03-15

## 2019-03-05 RX ORDER — FOLIC ACID 1 MG/1
1 TABLET ORAL DAILY
Status: ON HOLD | DISCHARGE
Start: 2019-03-05 | End: 2019-06-15

## 2019-03-05 RX ORDER — FLUTICASONE PROPIONATE 50 MCG
1 SPRAY, SUSPENSION (ML) NASAL DAILY PRN
Status: ON HOLD | DISCHARGE
Start: 2019-03-05 | End: 2019-05-31

## 2019-03-05 RX ORDER — GABAPENTIN 600 MG/1
600 TABLET ORAL 2 TIMES DAILY
Qty: 60 TABLET | Refills: 11 | DISCHARGE
Start: 2019-03-05 | End: 2019-06-06

## 2019-03-05 RX ORDER — IPRATROPIUM BROMIDE 42 UG/1
2 SPRAY, METERED NASAL 4 TIMES DAILY
Status: ON HOLD | DISCHARGE
Start: 2019-03-05 | End: 2019-06-15

## 2019-03-05 RX ORDER — LANOLIN ALCOHOL/MO/W.PET/CERES
100 CREAM (GRAM) TOPICAL DAILY
DISCHARGE
Start: 2019-03-05 | End: 2020-02-27

## 2019-03-05 RX ORDER — CARVEDILOL 3.12 MG/1
TABLET ORAL
Qty: 180 TABLET | Refills: 0 | DISCHARGE
Start: 2019-03-05 | End: 2019-05-29

## 2019-03-05 RX ORDER — MULTIVITAMIN,THERAPEUTIC
1 TABLET ORAL DAILY
Status: ON HOLD | DISCHARGE
Start: 2019-03-05 | End: 2019-06-15

## 2019-03-05 RX ORDER — CEFAZOLIN SODIUM 2 G/100ML
2 INJECTION, SOLUTION INTRAVENOUS EVERY 12 HOURS
Status: DISCONTINUED | OUTPATIENT
Start: 2019-03-05 | End: 2019-03-08

## 2019-03-05 RX ADMIN — FOLIC ACID 1 MG: 1 TABLET ORAL at 09:27

## 2019-03-05 RX ADMIN — RANITIDINE 150 MG: 150 TABLET ORAL at 09:27

## 2019-03-05 RX ADMIN — PIPERACILLIN SODIUM,TAZOBACTAM SODIUM 3.38 G: 3; .375 INJECTION, POWDER, FOR SOLUTION INTRAVENOUS at 16:10

## 2019-03-05 RX ADMIN — CARVEDILOL 3.12 MG: 3.12 TABLET, FILM COATED ORAL at 18:49

## 2019-03-05 RX ADMIN — PIPERACILLIN SODIUM,TAZOBACTAM SODIUM 3.38 G: 3; .375 INJECTION, POWDER, FOR SOLUTION INTRAVENOUS at 04:29

## 2019-03-05 RX ADMIN — SODIUM CHLORIDE: 9 INJECTION, SOLUTION INTRAVENOUS at 01:28

## 2019-03-05 RX ADMIN — PIPERACILLIN SODIUM,TAZOBACTAM SODIUM 3.38 G: 3; .375 INJECTION, POWDER, FOR SOLUTION INTRAVENOUS at 09:28

## 2019-03-05 RX ADMIN — Medication 100 MG: at 09:27

## 2019-03-05 RX ADMIN — GABAPENTIN 300 MG: 300 CAPSULE ORAL at 09:27

## 2019-03-05 RX ADMIN — CEFAZOLIN SODIUM 2 G: 2 INJECTION, SOLUTION INTRAVENOUS at 21:21

## 2019-03-05 RX ADMIN — Medication 1000 MCG: at 09:26

## 2019-03-05 RX ADMIN — THERA TABS 1 TABLET: TAB at 09:27

## 2019-03-05 RX ADMIN — GABAPENTIN 300 MG: 300 CAPSULE ORAL at 21:19

## 2019-03-05 RX ADMIN — MELATONIN 3 MG: 3 TAB ORAL at 22:07

## 2019-03-05 RX ADMIN — ACETAMINOPHEN 650 MG: 325 TABLET, FILM COATED ORAL at 18:27

## 2019-03-05 ASSESSMENT — ACTIVITIES OF DAILY LIVING (ADL)
ADLS_ACUITY_SCORE: 30

## 2019-03-05 NOTE — PROGRESS NOTES
[facility]    HOSPITALIST PROGRESS NOTE :   --------------------------------------------------    Date of Admission:  2/28/2019  Date of Service: 03/05/2019    Cumulative Summary: Antonio Ramirez is a 73 year old male with history of chronic anemia, alcoholic liver cirrhosis, heart failure with preserved ejection fraction, chronic kidney disease who was admitted on February 28, 2019 with buttock pain and was found to have left leg hematoma in the setting of anticoagulation in the picture of fall at home.  He was also found to have supratherapeutic INR and required reversal on admission and was also evaluated by surgery.    Assessment & Plan      Left leg hematoma in the setting of anticoagulation  Fall at home likely mechanical in the setting of alcohol abuse  Supratherapeutic INR in the setting of anticoagulation/alcohol use status post reversal.  Physical deconditioning due to acute illness/chronic debility.  Assessment:  Patient brought into the ED via EMS for buttock pain, had a fall 10 days ago.  Has had third visit to the ED for weakness related to this. Has mostly been bedbound since the last 10 days and would like to [facility].  Patient admits to been drinking around 4-5 drinks of vodka a day.  Last drink on night prior to admission  On exam bruising left buttock area, left thigh consistent with extremity contusion/hematoma.    Ultrasound of lower extremity venous duplex left leg showed no evidence of DVT.  CT left hip showed multiple intramuscular hematomas throughout the left lower extremity.  Presented with INR of 7.45, had vitamin K for reversal    Surgery consult requested for left leg hematoma.  Wound care consult requested for skin tears on hematoma.    --Patient was evaluated by general surgery who went in agreement with reversing of INR and possible vascular medicine consultation given extensive peripheral vascular disease and new left lower extremity edema with no  DVT noted on ultrasound.  --During this admission patient underwent CT scan of the left hip without contrast which showed multiple intramuscular hematomas throughout the left lower extremity most conspicuously involving the gluteus musculature and posterior compartment.  There was also diffuse edema and swelling throughout the visualized left upper extremity.  --X-ray of the lumbar spine showed diffuse intervertebral disc space narrowing throughout the lumbar spine.  --MRI of the lumbar spine was also done yesterday which showed multilevel degenerative disc and facet disease most advanced at L4-L5 level where there is severe central canal stenosis and moderate bilateral neural foraminal stenosis there is also moderate to severe bilateral neural foraminal stenosis at L5-S1.  -- Continue PRN oxycodone for mild to moderate pain.  IV Dilaudid for severe pain.    -- Prn warm compress. Fall precautions.  -- currently patient is off AC due to multiple falls , aware about risk of stroke while he is off AC.  -- appreciate general surgery help, who are ok with patient being discharged from their point of view  --Patient has been evaluated by physical and Occupational Therapy is recommended to be discharged to transitional care unit  are following.    Fever  PNA?  Bacteremia  Assessment: Patient was noticed to have fever of 102.5 this morning with blood pressure of 104/52 and saturating 94% on room air.  Patient was evaluated urgently this morning.  Patient is denying any cough or any urinary burning and frequency.  Patient does not have any new rash. UA showed 23 WBC counts and moderate amount of leukocyte esterase, urine culture is pending.Chest x-ray concerning for right lower lobe pneumonia possible aspiration? BC and UC now with S aureus (penicillin resistant) and Enterococcus, sensitivities pending. Unclear source of bacteremia.   Plan:  -- ID consulted  -- Continue IV Zosyn   -- Follow enterococcus  sensitivities  -- If spikes fever would add Vancomycin  -- SLP to evaluate aspiration risk.     Acute on chronic anemia likely from hematoma/dilutional with macrocytosis  Baseline hemoglobin between 7.5-8.5.  Presented with a hemoglobin of 8.3, received 1 unit blood transfusion with which hemoglobin this morning at 7.5  Plan:  -- We will transfuse if hemoglobin less than 7 or symptomatic.  -- We will hold off anemia workup at this time as patient has already received blood transfusion.  -- started on vitamin B12 supplements given macrocytosis/alcohol use  -- Hold Anticoagulation at this time       History of alcohol abuse  Alcoholic liver cirrhosis with ascites, portal hypertension.  Assessment: Drinks approx 1/3rd bottle of vodka a day at home. No withdrawal symptoms on this past hospital admission  Plan:  --CIWA protocol now stopped.   --Started on thiamine, multivitamin, folic acid supplements.  --We will monitor electrolytes including potassium and magnesium closely.  --Advised patient to abstain from alcohol.     Paroxysmal atrial fibrillation on chronic anticoagulation: Patient denies any chest pain or palpitation.    --Continue PTA Coreg.  --PTA Xarelto/aspirin on hold given hematomas and recurrent falls , not sure if he is a good candidate for AC in the picture of on going alcoholism and multiple falls ,he also have history of subdural hematomas in the past and now presenting with gluteal hematoma from fall , will recommend holding AC and considering to restart once patient fall risk is decreased and he is sober.     Antiphospholipid Antibody Syndrome (AAS), IVC filter in place, on chronic anticoagulation:  History of Pulmonary Embolism, Deep Vein Thrombosis:   --PTA Xarelto, aspirin on hold today given supratherapeutic INR, will need to revisit discussion on risks and benefits of anticoagulation in patient with recurrent falls.     History of HFrEF, EF 40-45%.  History of cardiomyopathy.   Mild valvular  aortic stenosis, moderate mitral regurgitation, mild to moderate tricuspid regurgitation, mild ascending aorta dilatation.  Coronary artery disease s/p 3-v CABG.  Thoracic aortic aneurysm without rupture.  Assessement: Troponin on admission 0.030. EKG showed atrial fibrillation with slow ventricular response, heart rate 57, .  Nonspecific ST-T wave changes.  Patient does not have specific complaint of chest discomfort, has been having frequent falls and is very deconditioned and barely able to move for last 10 days.  Echocardiogram for evaluation of heart function/valvular abnormalities showed no significant changes compared to previous. See  Report below  Plan:  --Continue PTA Coreg.  --Can resume PTA torsemide at discharge if Cr falls back to baseline  --Holding PTA ASA, warfarin as above  --Not on statin PTA due to intolerance       Acute on chronic kidney injury likely prerenal.  Chronic kidney disease, stage 3.: Baseline creatinine between 1.2-1.4.   History of ANSELMO requiring HD in 2010. Presented with creatinine of 1.96 that is now improving to 1.57  Plan:   -- discontinue IVF given good PO intake  -- Avoid nephrotoxic drugs, monitor renal function a.m.     History of for subdural hematoma status post craniotomy 7/2018  Patient had a mechanical fall in the setting of anticoagulation, had subdural hematoma and had craniotomy and evacuation.  Then anticoagulation was on hold for a few weeks and later restarted.     Hyperlipidemia.  --Does not tolerate statin therapy, not on PTA medication for above diagnosis;      Essential hypertension.  --Continue PTA Coreg.  --PTA torsemide on hold.     Gastroesophageal reflux disease.Not on any PTA medications for above diagnosis, .  --Ranitidine 150 mg twice daily started.      Low grade B cell lymphoproliferative disorder.  History of prostate cancer s/p radiative seed implants.  -- No acute issues,       Peripheral polyneuropathy.  --PTA Gabapentin 600mg PO BID  reduced to 300 mg twice daily given renal injury. Give additional 300 mg today once. Check Cr in AM.      Pressure injury coccyx.  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  Stage I pressure injury to the sacrum  --Wound care team consulted.  Appreciate recommendation.     Diet: Combination Diet Regular Diet Adult  Snacks/Supplements Adult: Boost Plus; Between Meals  Room Service  Advance Diet as Tolerated    Levni Catheter: not present  DVT Prophylaxis: Pneumatic Compression Devices and Ambulate every shift  Code Status: Full Code    The patient's care was discussed with the Bedside Nurse and Patient.    Disposition Plan   Expected discharge: will hold discharge today, anticipate discontinue Tuesday morning if remains afebrile     Entered: William Shaffer MD 03/05/2019, 2:16 PM     Patient, family, interdisciplinary team involved in care and agrees with plan.  Total time - Greater than 35 min. More than 50% of time spent in direct patient care, care coordination, patient/caregiver counseling, and formalizing plan of care.     William Shaffer MD  Text Page (7am - 6pm)    ----------------------------------------------------------------------------------------------------------------------      Interval History     Patient was seen and examined.  Had low grade fever 99.6 last evening, none since and continues denying any coughing, urinary urgency frequency burning and new rash.  No new complaints otherwise. Hoping to discharge to [place]  No CP/SOB, tolerating good PO intake  Patient's wife at bedside.     -Data reviewed today: I reviewed all new labs and imaging results over the last 24 hours.    I personally reviewed no images or EKG's today.    Physical Exam   Temp: 98.7  F (37.1  C) Temp src: Oral BP: 92/45   Heart Rate: 61 Resp: 18 SpO2: 92 % O2 Device: None (Room air)    Vitals:    02/28/19 1536   Weight: 90.7 kg (200 lb)     Vital Signs with Ranges  Temp:  [98.5  F (36.9  C)-99.6  F (37.6  C)] 98.7  F (37.1  C)  Heart Rate:   [57-74] 61  Resp:  [18] 18  BP: ()/(45-65) 92/45  SpO2:  [92 %-97 %] 92 %  I/O last 3 completed shifts:  In: -   Out: 1025 [Urine:1025]    GENERAL: Alert , awake and oriented. NAD. Conversational, appropriate.elderly, looks chronically sick  HEENT: Normocephalic. EOMI. No icterus or injection. Nares normal.   LUNGS: Clear to auscultation. No dyspnea at rest.   HEART: Regular rate. Extremities perfused.   ABDOMEN: Soft, nontender, and nondistended. Positive bowel sounds.   EXTREMITIES: No LE edema noted. Multiple brusies on the leg and buttock area   NEUROLOGIC: Moves extremities x4 on command. No acute focal neurologic abnormalities noted.     Medications       carvedilol  3.125 mg Oral BID w/meals     cyanocobalamin  1,000 mcg Sublingual Daily     folic acid  1 mg Oral Daily     gabapentin  300 mg Oral BID     multivitamin, therapeutic  1 tablet Oral Daily     piperacillin-tazobactam  3.375 g Intravenous Q6H     ranitidine  150 mg Oral Daily     vitamin B1  100 mg Oral Daily       Data   Recent Labs   Lab 03/05/19  0815 03/04/19  1037 03/03/19  0916 03/03/19  0907 03/02/19  0720 03/01/19  1210 02/28/19  1625   WBC  --   --  8.2  --  10.3 6.5 6.8   HGB  --   --  7.5*  --  7.4* 7.1* 6.3*   MCV  --   --  100  --  102* 101* 101*   PLT  --   --  212  --  289 229 229   INR  --   --   --   --  3.16* 3.85* 7.45*     --   --  134 133 132* 131*   POTASSIUM 4.4  --   --  5.3 5.6* 5.0 5.4*   CHLORIDE 109  --   --  106 105 103 101   CO2 19*  --   --  20 22 23 26   BUN 42*  --   --  55* 59* 61* 65*   CR 1.57* 1.67*  --  1.81* 1.83* 1.84* 1.96*   ANIONGAP 6  --   --  8 6 6 4   BENNIE 8.0*  --   --  8.5 8.5 8.0* 8.5   GLC 94  --   --  92 96 123* 111*   ALBUMIN  --   --   --   --   --   --  2.7*   PROTTOTAL  --   --   --   --   --   --  6.9   BILITOTAL  --   --   --   --   --   --  2.9*   ALKPHOS  --   --   --   --   --   --  90   ALT  --   --   --   --   --   --  11   AST  --   --   --   --   --   --  24   TROPI  --   --    --   --   --   --  0.030       Imaging:   No results found for this or any previous visit (from the past 24 hour(s)).

## 2019-03-05 NOTE — PROGRESS NOTES
03/05/19 1514   General Information   Onset Date 02/28/19   Start of Care Date 03/05/19   Referring Physician Dr. Shaffer   Patient Profile Review/OT: Additional Occupational Profile Info See Profile for full history and prior level of function   Patient/Family Goals Statement Patient did notstate.    Swallowing Evaluation Bedside swallow evaluation   Behaviorial Observations Alert   Mode of current nutrition Oral diet   Type of oral diet Regular;Thin liquid   Respiratory Status Room air   Comments Antonio Ramirez is a 73 year old male with history of chronic anemia, alcoholic liver cirrhosis, heart failure with preserved ejection fraction, chronic kidney disease who was admitted on February 28, 2019 with buttock pain and was found to have left leg hematoma in the setting of anticoagulation in the picture of fall at home.  He was also found to have supratherapeutic INR and required reversal on admission and was also evaluated by surgery. Patient seen by this department in the past after a cranitomy.    Clinical Swallow Evaluation   Oral Musculature generally intact   Structural Abnormalities none present   Dentition present and adequate   Mucosal Quality adequate   Mandibular Strength and Mobility intact   Oral Labial Strength and Mobility WFL   Lingual Strength and Mobility impaired coordination   Velar Elevation intact   Buccal Strength and Mobility intact   Laryngeal Function Cough;Throat clear;Swallow;Voicing initiated;Dry swallow palpated   Oral Musculature Comments Mild deficits   Additional Documentation Yes   Additional evaluation(s) completed today Recommended   Rationale for completing additional evaluation VFSS but patient declined.    Swallow Eval   Feeding Assistance set up only required   Clinical Swallow Eval: Thin Liquid Texture Trial   Mode of Presentation, Thin Liquids cup;straw;self-fed   Volume of Liquid or Food Presented 4 oz of water   Oral Phase of Swallow Premature pharyngeal entry    Pharyngeal Phase of Swallow impaired;reduction in laryngeal movement;throat clearing   Diagnostic Statement Delayed throat clearing with a straw.    Clinical Swallow Eval: Puree Solid Texture Trial   Mode of Presentation, Puree spoon;self-fed   Volume of Puree Presented 4 oz of pudding   Oral Phase, Puree WFL   Pharyngeal Phase, Puree intact   Clinical Swallow Eval: Solid Food Texture Trial   Mode of Presentation, Solid self-fed   Volume of Solid Food Presented 2 lexa crackers   Oral Phase, Solid Residue in oral cavity   Oral Residue, Solid mid posterior tongue   Pharyngeal Phase, Solid impaired;reduction in laryngeal movement;repeated swallows;wet vocal quality after swallow;throat clearing   Diagnostic Statement Suspect pharyngeal retention due to vocal changes.    Swallow Compensations   Swallow Compensations Alternate viscosity of consistencies;Pacing;Reduce amounts;Multiple swallow   Results Suspect silent aspiration   General Therapy Interventions   Planned Therapy Interventions Dysphagia Treatment   Dysphagia treatment Instruction of safe swallow strategies   Swallow Eval: Clinical Impressions   Skilled Criteria for Therapy Intervention Skilled criteria met.  Treatment indicated.   Functional Assessment Scale (FAS) 5   Treatment Diagnosis Mild oral and pharyngeal dysphagia   Diet texture recommendations Regular diet;Thin liquids   Recommended Feeding/Eating Techniques alternate between small bites and sips of food/liquid;check mouth frequently for oral residue/pocketing;hard swallow w/ each bite or sip;maintain upright posture during/after eating for 30 mins;no straws;small sips/bites   Therapy Frequency 5 times/wk   Predicted Duration of Therapy Intervention (days/wks) 1 week   Anticipated Discharge Disposition inpatient rehabilitation facility   Risks and Benefits of Treatment have been explained. Yes   Patient, family and/or staff in agreement with Plan of Care Yes   Clinical Impression Comments  Patient presents with mild oral and pharyngeal dysphagia at bedside secondary to generalized weakness. He demonstrated premature entry of thin liquids and delayed swallow response. Mild throat clearing noted after swallows via the straw. Suspect penetration and can not rule out silent aspiration. Improved tolerance with a small single sips from the cup. Tolerated pudding without difficulty, with decreased laryngeal elevation. Mastication was sufficient and only minimal oral residue, but noted vocal changes and suspect pharyngeal residue. Patient is at a risk for aspiration on residue material and possible penetration/silent aspiration of thin liquids due to mild throat clearing. Patient declined to complete a video swallow study to fully assess pharyngeal function and determine aspiration risk. He was seen in the past 4/2018 and at that time he did not want diet modification despite increased risks. Recommend: 1. Cautiously continue on a regular diet and thin liquids. 2. Up in a chair for meals or upright, NO STRAWS, small bites/sips, double swallow and alternate liquids/solids.    Total Evaluation Time   Total Evaluation Time (Minutes) 15

## 2019-03-05 NOTE — PLAN OF CARE
A&Ox2, disoriented to time and situation; forgetful. VSS on RA, ex soft BP 92/45, scheduled coreg held. IVF discontinued; IV SL; continues on IV abx. Regular diet with good appetite. Total cares, T/R q2h. Incontinent b/b at times. Denies pain. Wounds to mid back and L thigh, dressings CDI. Discharge postponed due to bacteremia; ID consult placed. Will continue to monitor.

## 2019-03-05 NOTE — CONSULTS
ID consult dictated IMP 1 74 yo male vague ? Several week illness, falls, vague weakness, no fver now 2/2 Bc rapidly + MSSA conceivably secondary to falls/injury etc but high probability this is the actual main dx    REc 1 ancef, serial Bc, needs ESPERANZA, will need extended IV tx, follow pain sites no clear secondary sites

## 2019-03-05 NOTE — PROGRESS NOTES
Antimicrobial Stewardship Team Note    Antimicrobial Stewardship Program - A joint venture between [facility] and [facility] to optimize antibiotic management.     Patient: Antonio Ramirez  MRN: [id number]  Allergies: Ciprofloxacin and Hmg-coa-r inhibitors    Brief Summary: 73 year old male with history of chronic anemia, alcoholic liver cirrhosis, heart failure with preserved ejection fraction, chronic kidney disease who was admitted on February 28, 2019 with buttock pain and was found to have left leg hematoma in the setting of anticoagulation in the picture of fall at home.  He was also found to have supratherapeutic INR and required reversal on admission and was also evaluated by surgery.      New onset fever: Patient was noticed to have fever of 102.5 this morning with blood pressure of 104/52 and saturating 94% on room air.  Patient was evaluated urgently this morning.  Patient is denying any cough or any urinary burning and frequency.  Patient does not have any new rash.  Patient does have back wound which is covered in Mepilex and on my examination it does not seem to be infected, picture is taken      --At this time patient is not ready for discharge due to new onset fever.  Start patient on gentle IV fluids, will place holding parameters on blood pressure medication.  --We will check CBC, BMP and pro-calcitonin, results were reviewed, patient has normal leukocyte, his pro calcitonin is elevated for moderate risk for systemic infection, his electrolytes are in normal range, he continues to have elevated creatinine during this admission which has not improved.  --Urine analysis was also done which is showing 23 WBC counts and moderate amount of leukocyte esterase, urine culture is pending.  --2 sets of blood cultures are ordered, will follow up on the results.  --Chest x-ray was also done, I have personally reviewed the x-ray which is concerning for right lower lobe  pneumonia, at this time as patient is new to me I cannot completely rule out aspiration pneumonia either.  --Initially patient was started on IV vancomycin and IV Zosyn this morning after drawing the cultures, after reviewing the chest x-ray I will discontinue IV vancomycin and will continue IV Zosyn to cover him from aspiration pneumonia point of view.  -Vancomycin resumed given blood cultures positive for GPCs in cluster (verigene identified Staph aureus - negative for mecA gene)       Active Anti-infective Medications   (From admission, onward)                Start     Stop    03/03/19   vancomycin (VANCOCIN) injection  2,000 mg,   Intravenous,   EVERY 12 HOURS     Sepsis        --    03/03/19   piperacillin-tazobactam  3.375 g,   Intravenous,   EVERY 6 HOURS     Sepsis        --          Assessment:  Would strongly recommend obtaining an ID consult in the setting of positive blood cultures that have isolated MSSA.  Patient requires additional work-up including repeat blood cultures and could require a prolonged course of IV antibiotics.    Recommendations:  Medication Change:   -  .  Consult Recommendation: Infectious Disease   Recommended labs: Blood culture   Other Recommendation(s):   -      Pharmacy took the following actions: Electronic note created, Sticky note reminder created.    Discussed with ID Staff Dr. Chandu Winchester, PharmD, BCPS

## 2019-03-05 NOTE — CONSULTS
Consult Date:  03/05/2019      INFECTIOUS DISEASE CONSULTATION       REQUESTING PHYSICIAN:  Dr. William Shaffer.      IMPRESSION:   1.  A 73-year-old male with vague possibly several week illness of malaise, fatigue, anemia, multiple falls including ecchymosis and injuries.  No significant fever or perceived infection, then fever spike in the hospital.  Blood cultures 2/2 rapidly positive for Staph aureus.  Conceivably hospital-acquired infection, ecchymosis/injury related infection, but much higher probability this is the actual underlying main primary diagnosis, i.e., Staph aureus bacteremia syndrome explaining his clinical illness of several weeks.   2.  Methicillin-sensitive Staph aureus bacteremia, high grade, possible endocarditis given major valve disease, no clear secondary sites, but multiple pain sites including back pain where imaging was negative.   3.  Positive UA and UC for enterococcus, significance not clear, no actual focal symptoms or anatomic abnormalities.  Unlikely to be relevant.   4.  Acute on chronic renal insufficiency.   5.  Acute on chronic anemia, very likely related to the current acute illness.   6.  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   7.  Multiple valve abnormalities, aortic stenosis, mitral regurgitation and tricuspid regurgitation, loud impressive murmur.   8.  Atrial fibrillation on chronic anticoagulation.   9.  Coronary artery disease.   10.  CIPRO ALLERGY.   11.  Chronic alcohol abuse.   12.  Prior subdural hematoma.   13.  Multiple joints in place with replacements that do not look infected.      RECOMMENDATIONS:   1.  Ancef at renal reduced dosed 2 g q. 12 hours.   2.  Blood cultures x 2 now and serial blood cultures daily.   3.  Given the multiple valve abnormalities and high-grade bacteremia, assuming no contraindication, would proceed to a transesophageal echocardiogram even if blood clears easily.   4.  For now, ignoring the urine  culture other than the couple of days of treatment that have been done.   5.  Continue to follow for secondary sites of infection, multiple injured sites and ecchymotic areas and fluid collections in the hip area and multiple joints, but none that are obvious infection.  Currently in the back where he has had pain is imaging negative, so for now simply antibiotics.   6.  Will need prolonged extended antibiotics but of course no line placement for now until documented blood clearing.      HISTORY OF PRESENT ILLNESS:  This 73-year-old male seen in consultation due to Staph aureus bacteremia syndrome.  The patient has a vague history and is a very poor historian.  Some degree of alcohol abuse and cognitive dysfunction apparent.  At home has been feeling weak and has had multiple recent falls, with that has had injuries and ecchymotic areas develop.  He never had significant fever as part of this, but has had a general feeling of malaise and not feeling well.  At his initial presentation there was not any clearcut diagnosis and at first no particular perception of infection.  His labs were not particularly suggestive of this, although he did have anemia and acute renal insufficiency.  In the hospital, he developed acute fever and at that point blood cultures were obtained and were rapidly positive, all bottles, all cultures, for Staph aureus.  He is complaining of pain where the ecchymotic sites are and some degree of back pain.  None of his total joint sites are painful.  He has a history of major valve abnormalities, historically without prior infection problems.      PAST MEDICAL HISTORY:   1.  History of coronary artery disease, history of aortic stenosis, mitral regurgitation and tricuspid regurgitation.  History of atrial fibrillation, on anticoagulation, history of chronic alcohol abuse.   2.  History of prior below-knee amputation.   3.  Multiple joint replacements, none of which are painful.   4.  Two recent  and current urines that were mildly abnormal with enterococcus in the urine.  No major anatomic abnormalities.   5.  Prior subdural hematoma.      ALLERGIES:  Listed as CIPRO.        SOCIAL AND FAMILY HISTORY:  No particular recent travels or exposures.  No known resistant pathogens.      MEDICATIONS:  As listed.      REVIEW OF SYSTEMS:  Some pain but not particularly impressive pain sites currently.  Very poor historian, somewhat sleepy.  Cognition not at baseline.      PHYSICAL EXAMINATION:   GENERAL:  The patient looks mildly ill in a chronic fashion.  No particular toxicity at present.   VITAL SIGNS:  Blood pressure stable, had a fever spike to 102.5 on  but temperature has been down since on broad antibiotics.   HEENT:  No thrush or intraoral lesions, somewhat poor oral hygiene.   NECK:  Supple and nontender, no lymphadenopathy, no meningismus.   CARDIOVASCULAR:  Loud systolic murmur.  Slight diastolic murmur, slight irregularity.   LUNGS:  Crackles at both bases but okay air movement.  Not hypoxic.   ABDOMEN:  Soft, nontender.   EXTREMITIES:  BKA area ecchymotic area there and in the left hip area.  No particular focal back tenderness.  Some edema present.  No embolic lesions.      LABORATORY DATA:  Significant anemia.  Hemoglobin as low as 6s.  His white count has been generally normal.  Blood cultures as noted.  Urine abnormal and urine culture greater than 100,000 colonies Enterococcus.  MRI of the back unremarkable.      Thank you very much for this consultation.  I will follow the patient with you.         JERMAIN VALDOVINOS MD             D: 2019   T: 2019   MT: WT      Name:     GALILEA LUGO   MRN:      [id number]        Account:       [id number]   :      1945           Consult Date:  2019      Document: [id number]       cc: Main Hardy MD

## 2019-03-05 NOTE — PLAN OF CARE
Discharge Planner SLP   Patient plan for discharge: Patient did not state.   Current status: Bedside swallow evaluation completed. Patient presents with mild oral and pharyngeal dysphagia at bedside secondary to generalized weakness. He demonstrated premature entry of thin liquids and delayed swallow response. Mild throat clearing noted after swallows via the straw. Suspect penetration and can not rule out silent aspiration. Improved tolerance with a small single sips from the cup. Tolerated pudding without difficulty, with decreased laryngeal elevation. Mastication was sufficient and only minimal oral residue, but noted vocal changes and suspect pharyngeal residue. Patient is at a risk for aspiration on residue material and possible penetration/silent aspiration of thin liquids due to mild throat clearing. Patient declined to complete a video swallow study to fully assess pharyngeal function and determine aspiration risk. He was seen in the past 4/2018 and at that time he did not want diet modification despite increased risks. Recommend: 1. Cautiously continue on a regular diet and thin liquids. 2. Up in a chair for meals or upright, NO STRAWS, small bites/sips, double swallow and alternate liquids/solids.   Barriers to return to prior living situation: Deconditioning/safety  Recommendations for discharge: TCU  Rationale for recommendations: Patient may need short term ST needs at TCU for dysphagia for swallow strategies.        Entered by: Niki Benavidez 03/05/2019 4:16 PM

## 2019-03-05 NOTE — PLAN OF CARE
Pt is A/O x 2, disoriented to time and situation,forgetful, total care, 2 assist with mechanical Lift, L BKA. VSS. Denies pain,IVF and abx. Regular diet. Melatonin given at bedtime per patient request,Incont B&B at times, stage 1 pressure injury to sacrum, open to air, mepilex dressing to mid back and left stump, require right to left side turn with pillows, patient c/o about staffing turning and repositioning him, educated pt about the importance of turns & reposition and how to prevent further injury without effect. Charge nurse aware and spoke with patient, Plans to discharge to TCU on PO abx today as long as pt remains afebrile.

## 2019-03-05 NOTE — PROGRESS NOTES
FAHEEM  D) Patient may be ready for a TCU stay today.    I) Followed up with Marie at [facility]. They no longer have a bed for patient.   Resent the referral to [facility] and left a message for Admissions.  Resent the referral to [facility] and spoke with Delmis. They do not expect to have a bed available for today.    P) Awaiting response from [facility].If patient is medically ready for discharge today, may need more options from family.    Addendum  Patient was accepted at [facility]. However, the discharge was then cancelled by MD due to bacteremia.

## 2019-03-06 ENCOUNTER — APPOINTMENT (OUTPATIENT)
Dept: PHYSICAL THERAPY | Facility: CLINIC | Age: 74
DRG: 605 | End: 2019-03-06
Attending: STUDENT IN AN ORGANIZED HEALTH CARE EDUCATION/TRAINING PROGRAM
Payer: MEDICARE

## 2019-03-06 ENCOUNTER — APPOINTMENT (OUTPATIENT)
Dept: SPEECH THERAPY | Facility: CLINIC | Age: 74
DRG: 605 | End: 2019-03-06
Payer: MEDICARE

## 2019-03-06 LAB
ANION GAP SERPL CALCULATED.3IONS-SCNC: 5 MMOL/L (ref 3–14)
BUN SERPL-MCNC: 39 MG/DL (ref 7–30)
CALCIUM SERPL-MCNC: 8.1 MG/DL (ref 8.5–10.1)
CHLORIDE SERPL-SCNC: 109 MMOL/L (ref 94–109)
CO2 SERPL-SCNC: 21 MMOL/L (ref 20–32)
CREAT SERPL-MCNC: 1.49 MG/DL (ref 0.66–1.25)
ERYTHROCYTE [DISTWIDTH] IN BLOOD BY AUTOMATED COUNT: 16.6 % (ref 10–15)
GFR SERPL CREATININE-BSD FRML MDRD: 45 ML/MIN/{1.73_M2}
GLUCOSE SERPL-MCNC: 127 MG/DL (ref 70–99)
HCT VFR BLD AUTO: 23.8 % (ref 40–53)
HGB BLD-MCNC: 7.6 G/DL (ref 13.3–17.7)
MCH RBC QN AUTO: 31.8 PG (ref 26.5–33)
MCHC RBC AUTO-ENTMCNC: 31.9 G/DL (ref 31.5–36.5)
MCV RBC AUTO: 100 FL (ref 78–100)
PLATELET # BLD AUTO: 167 10E9/L (ref 150–450)
POTASSIUM SERPL-SCNC: 4.2 MMOL/L (ref 3.4–5.3)
RBC # BLD AUTO: 2.39 10E12/L (ref 4.4–5.9)
SODIUM SERPL-SCNC: 135 MMOL/L (ref 133–144)
WBC # BLD AUTO: 5 10E9/L (ref 4–11)

## 2019-03-06 PROCEDURE — 82565 ASSAY OF CREATININE: CPT | Performed by: STUDENT IN AN ORGANIZED HEALTH CARE EDUCATION/TRAINING PROGRAM

## 2019-03-06 PROCEDURE — 87040 BLOOD CULTURE FOR BACTERIA: CPT | Performed by: INTERNAL MEDICINE

## 2019-03-06 PROCEDURE — 25000132 ZZH RX MED GY IP 250 OP 250 PS 637: Mod: GY | Performed by: HOSPITALIST

## 2019-03-06 PROCEDURE — A9270 NON-COVERED ITEM OR SERVICE: HCPCS | Mod: GY | Performed by: INTERNAL MEDICINE

## 2019-03-06 PROCEDURE — 12000000 ZZH R&B MED SURG/OB

## 2019-03-06 PROCEDURE — 99233 SBSQ HOSP IP/OBS HIGH 50: CPT | Performed by: INTERNAL MEDICINE

## 2019-03-06 PROCEDURE — A9270 NON-COVERED ITEM OR SERVICE: HCPCS | Mod: GY | Performed by: HOSPITALIST

## 2019-03-06 PROCEDURE — 97140 MANUAL THERAPY 1/> REGIONS: CPT | Mod: GP

## 2019-03-06 PROCEDURE — 92526 ORAL FUNCTION THERAPY: CPT | Mod: GN | Performed by: SPEECH-LANGUAGE PATHOLOGIST

## 2019-03-06 PROCEDURE — 85027 COMPLETE CBC AUTOMATED: CPT | Performed by: STUDENT IN AN ORGANIZED HEALTH CARE EDUCATION/TRAINING PROGRAM

## 2019-03-06 PROCEDURE — 25000128 H RX IP 250 OP 636: Performed by: INTERNAL MEDICINE

## 2019-03-06 PROCEDURE — 36415 COLL VENOUS BLD VENIPUNCTURE: CPT | Performed by: STUDENT IN AN ORGANIZED HEALTH CARE EDUCATION/TRAINING PROGRAM

## 2019-03-06 PROCEDURE — 80048 BASIC METABOLIC PNL TOTAL CA: CPT | Performed by: STUDENT IN AN ORGANIZED HEALTH CARE EDUCATION/TRAINING PROGRAM

## 2019-03-06 PROCEDURE — 25000132 ZZH RX MED GY IP 250 OP 250 PS 637: Mod: GY | Performed by: INTERNAL MEDICINE

## 2019-03-06 PROCEDURE — 97530 THERAPEUTIC ACTIVITIES: CPT | Mod: GP

## 2019-03-06 RX ADMIN — GABAPENTIN 300 MG: 300 CAPSULE ORAL at 21:05

## 2019-03-06 RX ADMIN — RANITIDINE 150 MG: 150 TABLET ORAL at 08:53

## 2019-03-06 RX ADMIN — CEFAZOLIN SODIUM 2 G: 2 INJECTION, SOLUTION INTRAVENOUS at 08:53

## 2019-03-06 RX ADMIN — CARVEDILOL 3.12 MG: 3.12 TABLET, FILM COATED ORAL at 18:19

## 2019-03-06 RX ADMIN — Medication 1000 MCG: at 08:52

## 2019-03-06 RX ADMIN — GABAPENTIN 300 MG: 300 CAPSULE ORAL at 08:52

## 2019-03-06 RX ADMIN — CEFAZOLIN SODIUM 2 G: 2 INJECTION, SOLUTION INTRAVENOUS at 21:05

## 2019-03-06 RX ADMIN — FOLIC ACID 1 MG: 1 TABLET ORAL at 08:52

## 2019-03-06 RX ADMIN — CARVEDILOL 3.12 MG: 3.12 TABLET, FILM COATED ORAL at 08:52

## 2019-03-06 RX ADMIN — Medication 100 MG: at 08:52

## 2019-03-06 RX ADMIN — MELATONIN 3 MG: 3 TAB ORAL at 21:10

## 2019-03-06 RX ADMIN — THERA TABS 1 TABLET: TAB at 08:52

## 2019-03-06 ASSESSMENT — ACTIVITIES OF DAILY LIVING (ADL)
ADLS_ACUITY_SCORE: 30
ADLS_ACUITY_SCORE: 29
ADLS_ACUITY_SCORE: 30

## 2019-03-06 NOTE — PROGRESS NOTES
[facility]  Infectious Disease Progress Note          Assessment and Plan:     IMPRESSION:   1.  A 73-year-old male with vague possibly several week illness of malaise, fatigue, anemia, multiple falls including ecchymosis and injuries.  No significant fever or perceived infection, then fever spike in the hospital.  Blood cultures 2/2 rapidly positive for Staph aureus.  Conceivably hospital-acquired infection, ecchymosis/injury related infection, but much higher probability this is the actual underlying main primary diagnosis, i.e., Staph aureus bacteremia syndrome explaining his clinical illness of several weeks.   2.  Methicillin-sensitive Staph aureus bacteremia, high grade, possible endocarditis given major valve disease, no clear secondary sites, but multiple pain sites including back pain where imaging was negative.   3.  Positive UA and UC for enterococcus, significance not clear, no actual focal symptoms or anatomic abnormalities.  Unlikely to be relevant.   4.  Acute on chronic renal insufficiency.   5.  Acute on chronic anemia, very likely related to the current acute illness.   6.  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   7.  Multiple valve abnormalities, aortic stenosis, mitral regurgitation and tricuspid regurgitation, loud impressive murmur.   8.  Atrial fibrillation on chronic anticoagulation.   9.  Coronary artery disease.   10.  CIPRO ALLERGY.   11.  Chronic alcohol abuse.   12.  Prior subdural hematoma.   13.  Multiple joints in place with replacements that do not look infected.   14 apparent chronic low grade lymphoma, status unclear  15 Prior PE and antiphospholipid syndrome     RECOMMENDATIONS:   1.  Ancef at renal reduced dosed 2 g q. 12 hours.   2.  Blood cultures x 2 now and serial blood cultures daily.   3.  Given the multiple valve abnormalities and high-grade bacteremia, assuming no contraindication exists, at some point would do  "a transesophageal echocardiogram even if blood clears easily.   4.  For now, ignoring the urine culture 9other than the couple of days of treatment that have been done).   5.  Continue to follow for secondary sites of infection, multiple injured sites and ecchymotic areas and fluid collections in the hip area and multiple joints, but none that are obvious infection.  Currently in the back where he has had pain is imaging negative, so for now simply antibiotics.   6.  Will need prolonged  Antibiotics,  but of course no line placement for now until documented blood clearing.             Interval History:   Sleepy no new pain site T down, creat 1.57 Follow-up BC pending              Medications:       carvedilol  3.125 mg Oral BID w/meals     ceFAZolin  2 g Intravenous Q12H     cyanocobalamin  1,000 mcg Sublingual Daily     folic acid  1 mg Oral Daily     gabapentin  300 mg Oral BID     multivitamin, therapeutic  1 tablet Oral Daily     ranitidine  150 mg Oral Daily     vitamin B1  100 mg Oral Daily                  Physical Exam:   Blood pressure 98/55, pulse 59, temperature 97.7  F (36.5  C), temperature source Oral, resp. rate 16, height 1.854 m (6' 1\"), weight 90.7 kg (200 lb), SpO2 96 %.  Wt Readings from Last 2 Encounters:   02/28/19 90.7 kg (200 lb)   02/20/19 90.7 kg (200 lb)     Vital Signs with Ranges  Temp:  [97.5  F (36.4  C)-98.6  F (37  C)] 97.7  F (36.5  C)  Pulse:  [53-59] 59  Heart Rate:  [52-72] 52  Resp:  [16-18] 16  BP: ()/(55-68) 98/55  SpO2:  [96 %-99 %] 96 %    Constitutional: sleepy cooperative, no apparent distress chronic ill appearance   Lungs: Clear to auscultation bilaterally, no crackles or wheezing   Cardiovascular: Regular rate and rhythm, normal S1 and S2, and no murmur noted   Abdomen: Normal bowel sounds, soft, non-distended, non-tender   Skin: No rashes, no cyanosis,sig edema ecchymosis, no clear involved joints   Other:           Data:   All microbiology laboratory data " reviewed.  Recent Labs   Lab Test 03/03/19  0916 03/02/19  0720 03/01/19  1210   WBC 8.2 10.3 6.5   HGB 7.5* 7.4* 7.1*   HCT 23.3* 24.0* 22.0*    102* 101*    289 229     Recent Labs   Lab Test 03/05/19  0815 03/04/19  1037 03/03/19  0907   CR 1.57* 1.67* 1.81*     No lab results found.  Recent Labs   Lab Test 03/05/19  1755 03/05/19  1753 03/03/19  1320 03/03/19  0922 03/03/19  0915 12/07/18  1321 07/24/18  1335 07/23/18  1512 07/23/18  1326   CULT No growth after 9 hours No growth after 9 hours >100,000 colonies/mL  Enterococcus faecalis  *  <10,000 colonies/mL  urogenital aranza  Susceptibility testing not routinely done   Cultured on the 1st day of incubation:  Staphylococcus aureus  *  Critical Value/Significant Value, preliminary result only, called to and read back by  Jg Reddy RN, @7071 03/03/19..    Susceptibility testing done on previous specimen Cultured on the 1st day of incubation:  Staphylococcus aureus  *  Critical Value/Significant Value, preliminary result only, called to and read back by  Jonas Reddy RN, @4223 03/03/19..    (Note)  POSITIVE for STAPHYLOCOCCUS AUREUS and NEGATIVE for the mecA gene  (not MRSA) by [facility] multiplex nucleic acid test. The mecA gene was  not detected. Final identification and antimicrobial susceptibility  testing will be verified by standard methods.    Specimen tested with Verigene multiplex, gram-positive blood culture  nucleic acid test for the following targets: Staph aureus, Staph  epidermidis, Staph lugdunensis, other Staph species, Enterococcus  faecalis, Enterococcus faecium, Streptococcus species, S. agalactiae,  S. anginosus grp., S. pneumoniae, S. pyogenes, Listeria sp., mecA  (methicillin resistance) and Rad/B (vancomycin resistance).    Critical Value/Significant Value called to and read back by Alma Mayfield RN  3.4.19 GEOVANY.   >100,000 colonies/mL  Enterococcus faecalis  * Cultured on the 1st day of  incubation:  Staphylococcus hominis  This isolate is presumed to be clindamycin resistant based on detection of inducible   clindamycin resistance. Erythromycin and clindamycin are resistant, therefore, they are   not recommended for use.  *  Critical Value/Significant Value, preliminary result only, called to and read back by  Ifrah Enamorado RN on SH66 at 1131 7/25/18 SRQ    (Note)  POSITIVE for Staphylococci other than S.aureus, S.epidermidis and  S.lugdunensis, by [facility] multiplex nucleic acid test.  Coagulase-negative staphylococci are the most common venipuncture or  collection associated skin CONTAMINANTS grown in blood cultures.  Final identification and antimicrobial susceptibility testing will be  verified by standard methods.    Specimen tested with Verigene multiplex, gram-positive blood culture  nucleic acid test for the following targets: Staph aureus, Staph  epidermidis, Staph lugdunensis, other Staph species, Enterococcus  faecalis, Enterococcus faecium, Streptococcus species, S. agalactiae,  S. anginosus grp., S. pneumoniae, S. pyogenes, Listeria sp., mecA  (methicillin resistance) and Rad/B (vancomycin resistance).    Critical Value/Significant Value called to and read back by Janet Donis RN, @ 8962 7.25.2018 BL   >100,000 colonies/mL  Klebsiella pneumoniae  * Cultured on the 2nd day of incubation:  Klebsiella pneumoniae  *  Critical Value/Significant Value, preliminary result only, called to and read back by  Janet Donis RN on 66 at 0900 7/25/18 SRQ    Susceptibility testing done on previous specimen

## 2019-03-06 NOTE — PROGRESS NOTES
CLINICAL NUTRITION SERVICES - REASSESSMENT NOTE      Malnutrition (3/1):   % Weight Loss:  Pt denies  % Intake:  No decreased intake noted  Subcutaneous Fat Loss:  None observed  Muscle Loss:  None observed  Fluid Retention:  Likely not nutrition-related     Malnutrition Diagnosis: Patient does not meet two of the above criteria necessary for diagnosing malnutrition          EVALUATION OF PROGRESS TOWARD GOALS   Diet:  Regular. Sending Boost Plus between meals      Intake/Tolerance:  Per flow sheets, intake decreased from 50-75% to 25%. Pt states he was forcing himself but now has gone back to his normal meal pattern of 1 meal/day.  He states he does drink the Boost, 100%.    NEW FINDINGS:   WOCN: 3/1 - Lt posterior stump:  Superficial wounds r/t edema and ecchymosis.  Coccyx and Lt buttock: ecchymosis and hematoma r/t fall, not pressure       Previous Goals:   Pt will consume at least 80 gm protein daily  Evaluation: Not met    Previous Nutrition Diagnosis:   Inadequate protein intake related to limited meal pattern as evidenced by diet hx   Evaluation: No change      CURRENT NUTRITION DIAGNOSIS  Same: Inadequate protein intake related to limited meal pattern as evidenced by diet hx       INTERVENTIONS  Recommendations / Nutrition Prescription  Continue current diet and supplements    Implementation  General/healthful diet - encouraged intake    Goals  Pt will consume at least 80 gm protein daily      MONITORING AND EVALUATION:  Progress towards goals will be monitored and evaluated per protocol and Practice Guidelines      Ya Denny RD  Pager [phone] (M-F)            [phone] (W/E & Hol)

## 2019-03-06 NOTE — PLAN OF CARE
A&Ox3, disoriented to situation. VSS on RA. Total cares; T/R q2h, patient refuses at times, education provided. Regular diet, good appetite. IV SL; continues IV abx. Wound dressings CDI. LS diminished. Denies pain. Lymph wraps initiated today per PT. Incontinent of b/b at times. ID following. Will continue to monitor.

## 2019-03-06 NOTE — PROGRESS NOTES
[facility]    Hospitalist Progress Note    Assessment & Plan   Antonio Ramirez is a 73 year old male with PMHx of alcoholic liver cirrhosis, CKD, hypertension, CAD with 3V CABG in 2007, afib, HFpEF, hx of DVT and anticardiolipin Ab, hx of UTIs, and hx of traumatic SDH in 4/2018 who was admitted on 2/28/2019 after a fall with resulting buttock pain and LLE hematoma in the setting of anticoagulation with supratherapuetic INR. Hospital course has been complicated by the development of MSSA bacteremia.    Left leg hematoma in the setting of anticoagulation  Fall at home, presumed mechanical in the setting of alcohol abuse  Supratherapeutic INR in the setting of anticoagulation/alcohol use status post reversal.  Physical deconditioning due to acute illness/chronic debility.  Low Back Pain  Patient brought to the ED via EMS for buttock pain after a fall which had occurred 10d prior. This represented his 3rd visit in recent weeks for complaints of weakness (seen in ED on 2/17 and 2/20). Endorsed he had been mostly bed-bound during the prior 10d and that he was now interested in going to TCU. Had been drinking 4-5 drinks of Vodka daily. Exam was notable for bruising on the buttock and left thigh and LLE edema, consistent with hematoma. General surgery consulted. At time of presentation, INR was 7.45 and he was given vitamin K for reversal. Nonsurgical management recommended. Serial hgbs remained stable.     During the course of his stay, he, underwent additional workup and imaging -- US of LLE showed no evidence of DVT. CT left hip showed multiple intramuscular hematomas throughout the LLE most conspicuously involving the gluteus musculature and posterior compartment. Xray of lumbar spine showed only disc space narrowing. MRI of the lumbar spine showed multiple level degenerative disc disease with severe central canal stenosis at L4-L5 with moderate bilateral neural foraminal stenosis and mod to severe  bilateral neural foraminal stenosis at L5-S1.    -- cont pain control with prn oxycodone, has IV dilaudid available for breakthrough/severe pain  -- evaluated by wound care for skin tears on hematoma  -- cont fall precautions  -- presently off anticoagulation dt hx of multiple falls -- patient aware of stroke risk given underlying hypercoagulable disorder  -- will need TCU stay at discharge    MSSA Bacteremia  Became febrile on 3/3 AM with Tmax 102.5. Denied cough, sob, rash or sx of UTI. UA showed mod leuk est and 23 WBCs. CXR with possible RLL infiltrate. Urine culture grew >100k enterococcus. Blood cultures obtained and subsequently grew MSSA. Source of bacteremia unclear, possibly related to falls.  -- ID following, on Ancef  -- daily blood cultures to confirm clearing of bacteremia  -- will eventually need ESPERANZA to evaluate for endocarditis (TTE done earlier this stay, prior to sx of bacteremia)  -- has been adequately treated for enterococcus in the urine     Acute on chronic anemia, likely from hematoma/dilutional, with macrocytosis  Baseline hemoglobin between 7.5-8.5.  Presented with a hemoglobin of 6.3 and was transfused 1U PRBC. Hgbs have since remained stable around 7.5  -- monitor hgb periodically and transfuse if <7  -- have deferred additional workup at this time  -- started on Vitamin B12 supplement  -- as above, given falls and hematoma, anticoagulation remains on hold     History of alcohol abuse  Alcoholic liver cirrhosis with ascites, portal hypertension.  Drinks approx 1/3rd bottle of vodka a day at home. No withdrawal symptoms on this past hospital admission. Monitored on CIWA protocol on admission. No withdrawal sx observed so this was ultimately stopped.   -- cont MVI, thiamine and folate    Paroxysmal atrial fibrillation on chronic anticoagulation:   In NSR this stay.   -- conts on Coreg  -- PTA anticoagulation (Xarelto) and aspirin remain on hold given recent falls and resulting hematoma, is  not a great candidate for continued anticoagulation given his hx of EtOH abuse and falls and bleeding complications (has noted hx of SDH)  -- anticipate resumption of anticoagulation once patient is no longer a fall risk      Antiphospholipid Antibody Syndrome (AAS), IVC filter in place, on chronic anticoagulation:  History of Pulmonary Embolism, Deep Vein Thrombosis:   As above, Xarelto and aspirin held this stay given fall risk  -- will need to discuss whether it is safe to resume these meds in the future when no longer a fall risk     History of HFrEF, EF 40-45%.  History of cardiomyopathy.  Mild valvular aortic stenosis, moderate mitral regurgitation, mild to moderate tricuspid regurgitation, mild ascending aorta dilatation.  Coronary artery disease s/p 3-v CABG.  Thoracic aortic aneurysm without rupture  Hypertension  Hyperlipidemia  Troponin on admission 0.030. EKG showed atrial fibrillation with slow ventricular response and nonspecific ST-T wave changes. Patient without chest pain. Echo obtained this stay for evaluation of heart function/valvular abnormalities showed no significant changes compared to previous -- EF 40 45%.   -- conts on Coreg  -- PTA torsemide has been held, can resume if renal function returns to baseline  -- holding aspirin and anticoagulation as above  -- no on statin dt hx of intolerance    ANSELMO on stage III CKD:  Baseline Cr 1.2 - 1.4. Cr 1.96 on admission. Presume prerenal dt blood loss from hematoma. CR improved after initial IVFs  -- monitor renal function, Cr 1.5 today  -- cont holding diuretics for now     History of for subdural hematoma status post craniotomy 7/2018  Patient had a mechanical fall in the setting of anticoagulation, developed a subdural hematoma and had craniotomy and evacuation. Anticoagulation was on hold for a few weeks and later resumed.    GERD:  Not on meds prior to admission  -- BID H2 blocker added this stay      Low grade B cell lymphoproliferative  disorder.  History of prostate cancer s/p radiative seed implants.   No acute issues,       Peripheral polyneuropathy.  PTA Gabapentin 600mg BID reduced to 300mg BID given renal injury -- cont same today     Pressure injury coccyx.  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  Stage I pressure injury to the sacrum  Wound care team consulted. Appreciate recommendations.     FEN: off IVFs, lytes stable, regular diet  DVT Prophylaxis: PCDs  Code Status: Full Code    Disposition: Discharge date unclear, pending clearance of bacteremia. Suspect several days still. Will need TCU at discharge.    Omaira Coleman    Interval History   Seen this afternoon. Resting quietly. No complaints. Affect flat. Denies cp/sob/cough, abd pain/n/v.    -Data reviewed today: I reviewed all new labs and imaging results over the last 24 hours. I personally reviewed no images or EKG's today.    Physical Exam   Temp: 97.7  F (36.5  C) Temp src: Oral BP: 98/55 Pulse: 59 Heart Rate: 52 Resp: 16 SpO2: 96 % O2 Device: None (Room air)    Vitals:    02/28/19 1536   Weight: 90.7 kg (200 lb)     Vital Signs with Ranges  Temp:  [97.5  F (36.4  C)-98.6  F (37  C)] 97.7  F (36.5  C)  Pulse:  [53-59] 59  Heart Rate:  [52-72] 52  Resp:  [16-18] 16  BP: ()/(55-68) 98/55  SpO2:  [96 %-99 %] 96 %  I/O last 3 completed shifts:  In: -   Out: 675 [Urine:675]    Constitutional: Resting comfortably, alert but with flat affet  Respiratory: CTAB, no wheeze/rales/rhonchi, no increased work of breathing  Cardiovascular: HRRR with +SM, trace bilateral LE edema  GI: S, NT, ND, +BS  Skin/Integumen: warm/dry, LLE wrapped, did not examine L thigh/buttock hematoma  Other:      Medications       carvedilol  3.125 mg Oral BID w/meals     ceFAZolin  2 g Intravenous Q12H     cyanocobalamin  1,000 mcg Sublingual Daily     folic acid  1 mg Oral Daily     gabapentin  300 mg Oral BID     multivitamin, therapeutic  1 tablet Oral Daily     ranitidine  150 mg Oral Daily     vitamin  B1  100 mg Oral Daily       Data   Recent Labs   Lab 03/06/19  0940 03/05/19  0815 03/04/19  1037 03/03/19  0916 03/03/19  0907 03/02/19  0720 03/01/19  1210 02/28/19  1625   WBC 5.0  --   --  8.2  --  10.3 6.5 6.8   HGB 7.6*  --   --  7.5*  --  7.4* 7.1* 6.3*     --   --  100  --  102* 101* 101*     --   --  212  --  289 229 229   INR  --   --   --   --   --  3.16* 3.85* 7.45*    134  --   --  134 133 132* 131*   POTASSIUM 4.2 4.4  --   --  5.3 5.6* 5.0 5.4*   CHLORIDE 109 109  --   --  106 105 103 101   CO2 21 19*  --   --  20 22 23 26   BUN 39* 42*  --   --  55* 59* 61* 65*   CR 1.49* 1.57* 1.67*  --  1.81* 1.83* 1.84* 1.96*   ANIONGAP 5 6  --   --  8 6 6 4   BENNIE 8.1* 8.0*  --   --  8.5 8.5 8.0* 8.5   * 94  --   --  92 96 123* 111*   ALBUMIN  --   --   --   --   --   --   --  2.7*   PROTTOTAL  --   --   --   --   --   --   --  6.9   BILITOTAL  --   --   --   --   --   --   --  2.9*   ALKPHOS  --   --   --   --   --   --   --  90   ALT  --   --   --   --   --   --   --  11   AST  --   --   --   --   --   --   --  24   TROPI  --   --   --   --   --   --   --  0.030       No results found for this or any previous visit (from the past 24 hour(s)).

## 2019-03-06 NOTE — PLAN OF CARE
Discharge Planner PT   Patient plan for discharge: Rehab  Current status: Orders received for lymphedema in addition to previous care plan. Wraps applied to B LE for 2-3+ pitting. Pt instructed on POC and symptoms of intolerance. Pt has had lymph wraps in the past with good result, has not used in a long time and currently unable to fit prosthetic. Nursing, please removed wraps at 8am on 3/7; PT to return at 9am to assess. Planned Juli Steady trial for sit<>stand, however device not available. During supine<>sit, pt requests bed pan, returned supine with B LE elevated.  Barriers to return to prior living situation: Decreased activity tolerance, level of assist, fall risk  Recommendations for discharge: TCU  Rationale for recommendations: Pt will benefit from continued therapy to address impairments and increase mobility and functional independence prior to returning home.        Entered by: Mat Redding 03/06/2019 10:22 AM

## 2019-03-06 NOTE — PROGRESS NOTES
SPIRITUAL HEALTH SERVICES Progress Note  [facility]    Initial visit with pt Antonio MORROW. Pt reports that he is doing fine.     Pt was reading a book through his phone. Pt wants to keep reading his book and there was no need in this moment.     I and SH remain available if needs arise.      Lashell Lamb  Chaplain Resident

## 2019-03-06 NOTE — PLAN OF CARE
Pt A&Ox4 with some forgetfulness, VSS on RA. Denies pain. Pulsate mattress delivered at 0200. Dressings on spine and L hip are CDI. Turn and reposition Q2H. BLE edema with lymphedema consult. Discharge TBD.

## 2019-03-06 NOTE — PLAN OF CARE
Discharge Planner SLP   Patient plan for discharge: He did not state.   Current status: SLP: Patient seen for a meal f/u session at noon meal. He had chicken noodle soup and a reilly lettuce and tomato sandwhich. He was able to tolerate the soup with adequate mastication and oral clearing. Masication was mildly prolonged for the sandwhich with minimal oral residue and intermittent vocal changes, so suspect pharyngeal retention. Thin liquids he had premature entry and no immediate Sx of aspiration, but delayed vocal changes. Patient does not wish to modify his diet or complete a video swallow study. Education provided to patient and his wife that an OP video swallow study can be completed at anytime if interested.  Recommend: 1. Continue on the regular diet (per his wishes) and thin liquids. 2. Upright, small bites/sips, no straws, alternate liquids/solids.  Barriers to return to prior living situation: Deconditioning   Recommendations for discharge: TCU  Rationale for recommendations: No further skilled intervention is indicated. Will complete the orders.     Speech Language Therapy Discharge Summary    Reason for therapy discharge:    Patient/family request discontinuation of services.    Progress towards therapy goal(s). See goals on Care Plan in [facility] health record for goal details.  Goals partially met.  Barriers to achieving goals:   Per his choice wishes to continue on a regular diet and thin liquids despite increased risks for aspiration .    Therapy recommendation(s):    No further therapy is recommended.           Entered by: Niki Benavidez 03/06/2019 1:44 PM

## 2019-03-06 NOTE — PLAN OF CARE
Pt A&O x2: disoriented to place and situation and forgetful. VSS on Ra. BP's were 90's/50's at start of shift and increased to 113/68 later in the evening. Pt turned and reposition q2h with assist of 2. Pt states he feels it does not help much- pulsate mattress ordered. Pt has 2+ edema in LE-lymphedema consult in. PRN Melatonin dose increased from 1mg to 3mg to help with sleep. BC grew MSSA- Scheduled zosyn d/c'd and IV ancef started q12h. Will continue to monitor.

## 2019-03-07 ENCOUNTER — APPOINTMENT (OUTPATIENT)
Dept: PHYSICAL THERAPY | Facility: CLINIC | Age: 74
DRG: 605 | End: 2019-03-07
Payer: MEDICARE

## 2019-03-07 LAB
ANION GAP SERPL CALCULATED.3IONS-SCNC: 7 MMOL/L (ref 3–14)
BUN SERPL-MCNC: 34 MG/DL (ref 7–30)
CALCIUM SERPL-MCNC: 8.4 MG/DL (ref 8.5–10.1)
CHLORIDE SERPL-SCNC: 107 MMOL/L (ref 94–109)
CO2 SERPL-SCNC: 22 MMOL/L (ref 20–32)
CREAT SERPL-MCNC: 1.39 MG/DL (ref 0.66–1.25)
GFR SERPL CREATININE-BSD FRML MDRD: 49 ML/MIN/{1.73_M2}
GLUCOSE SERPL-MCNC: 107 MG/DL (ref 70–99)
POTASSIUM SERPL-SCNC: 4.3 MMOL/L (ref 3.4–5.3)
SODIUM SERPL-SCNC: 136 MMOL/L (ref 133–144)

## 2019-03-07 PROCEDURE — 87040 BLOOD CULTURE FOR BACTERIA: CPT | Performed by: INTERNAL MEDICINE

## 2019-03-07 PROCEDURE — 80048 BASIC METABOLIC PNL TOTAL CA: CPT | Performed by: INTERNAL MEDICINE

## 2019-03-07 PROCEDURE — 99233 SBSQ HOSP IP/OBS HIGH 50: CPT | Performed by: INTERNAL MEDICINE

## 2019-03-07 PROCEDURE — 25000132 ZZH RX MED GY IP 250 OP 250 PS 637: Mod: GY | Performed by: HOSPITALIST

## 2019-03-07 PROCEDURE — 25000132 ZZH RX MED GY IP 250 OP 250 PS 637: Mod: GY | Performed by: INTERNAL MEDICINE

## 2019-03-07 PROCEDURE — 97140 MANUAL THERAPY 1/> REGIONS: CPT | Mod: GP

## 2019-03-07 PROCEDURE — A9270 NON-COVERED ITEM OR SERVICE: HCPCS | Mod: GY | Performed by: INTERNAL MEDICINE

## 2019-03-07 PROCEDURE — A9270 NON-COVERED ITEM OR SERVICE: HCPCS | Mod: GY | Performed by: HOSPITALIST

## 2019-03-07 PROCEDURE — 12000000 ZZH R&B MED SURG/OB

## 2019-03-07 PROCEDURE — 36415 COLL VENOUS BLD VENIPUNCTURE: CPT | Performed by: INTERNAL MEDICINE

## 2019-03-07 PROCEDURE — 97530 THERAPEUTIC ACTIVITIES: CPT | Mod: GP

## 2019-03-07 PROCEDURE — 25000128 H RX IP 250 OP 636: Performed by: INTERNAL MEDICINE

## 2019-03-07 RX ORDER — GABAPENTIN 300 MG/1
600 CAPSULE ORAL 2 TIMES DAILY
Status: DISCONTINUED | OUTPATIENT
Start: 2019-03-07 | End: 2019-03-14 | Stop reason: HOSPADM

## 2019-03-07 RX ADMIN — RANITIDINE 150 MG: 150 TABLET ORAL at 08:47

## 2019-03-07 RX ADMIN — THERA TABS 1 TABLET: TAB at 08:46

## 2019-03-07 RX ADMIN — Medication 1000 MCG: at 08:46

## 2019-03-07 RX ADMIN — CEFAZOLIN SODIUM 2 G: 2 INJECTION, SOLUTION INTRAVENOUS at 21:19

## 2019-03-07 RX ADMIN — GABAPENTIN 600 MG: 300 CAPSULE ORAL at 21:19

## 2019-03-07 RX ADMIN — Medication 100 MG: at 08:47

## 2019-03-07 RX ADMIN — FOLIC ACID 1 MG: 1 TABLET ORAL at 08:46

## 2019-03-07 RX ADMIN — ACETAMINOPHEN 650 MG: 325 TABLET, FILM COATED ORAL at 01:50

## 2019-03-07 RX ADMIN — GABAPENTIN 300 MG: 300 CAPSULE ORAL at 08:45

## 2019-03-07 RX ADMIN — CEFAZOLIN SODIUM 2 G: 2 INJECTION, SOLUTION INTRAVENOUS at 10:24

## 2019-03-07 ASSESSMENT — ACTIVITIES OF DAILY LIVING (ADL)
ADLS_ACUITY_SCORE: 30
ADLS_ACUITY_SCORE: 30
ADLS_ACUITY_SCORE: 29
ADLS_ACUITY_SCORE: 30

## 2019-03-07 ASSESSMENT — MIFFLIN-ST. JEOR: SCORE: 1860.88

## 2019-03-07 NOTE — PLAN OF CARE
Pt A&O x3, disoriented to situation. VSS on RA except intermittent bradycardia (57-63) soft BPs (110/63). Heart murmur detected. LS diminished. 2+ edema on L hip, leg, and knee. Wound dressings CDI. T/r q2h. Incontinent of bladder at times, BM x1 this shift in bedpan. LBKA. Pt reported lower back pn at 0200, gave prn tylenol, pt showed relief. Continue plan of care.

## 2019-03-07 NOTE — PLAN OF CARE
A&Ox3, disoriented to situation. VSS on RA. Total cares, T/R q2h, up to chair for meals. Regular diet, good appetite. Lymph wraps in place. BLE dressings changed, CDI. IV SL; continues IV abx. LS diminished. Denies pain. Incontinent of b/b at times. ID following. Will continue to monitor.

## 2019-03-07 NOTE — PROGRESS NOTES
D: FAHEEM following for discharge planning.   I: FAHEEM updated [facility] that pt could potentially be ready for discharge Friday. Per Marie, she will have a bed for pt.   P: FAHEEM following.     CRISTOBAL Loaiza, LGSW  [id number]

## 2019-03-07 NOTE — PLAN OF CARE
A/O. Left low back bruised. L leg/hip edema 3+. Mepilex intact on mid spine. Lymphedema wraps on BLEs. Saline locked. Total care, up with lift. Voiding adequately with urinal. Per MD, plan to discharge when blood cultures negative. Will likely need long-term antibiotics.

## 2019-03-07 NOTE — PROGRESS NOTES
[facility]  Infectious Disease Progress Note          Assessment and Plan:     IMPRESSION:   1.  A 73-year-old male with vague possibly several week illness of malaise, fatigue, anemia, multiple falls including ecchymosis and injuries.  No significant fever or perceived infection, then fever spike in the hospital.  Blood cultures 2/2 rapidly positive for Staph aureus.  Conceivably hospital-acquired infection, ecchymosis/injury related infection, but much higher probability this is the actual underlying main primary diagnosis, i.e., Staph aureus bacteremia syndrome explaining his clinical illness of several weeks.   2.  Methicillin-sensitive Staph aureus bacteremia, high grade, possible endocarditis given major valve disease, no clear secondary sites, but multiple pain sites including back pain where imaging was negative.   3.  Positive UA and UC for enterococcus, significance not clear, no actual focal symptoms or anatomic abnormalities.  Unlikely to be relevant.   4.  Acute on chronic renal insufficiency.   5.  Acute on chronic anemia, very likely related to the current acute illness.   6.  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   7.  Multiple valve abnormalities, aortic stenosis, mitral regurgitation and tricuspid regurgitation, loud impressive murmur.   8.  Atrial fibrillation on chronic anticoagulation.   9.  Coronary artery disease.   10.  CIPRO ALLERGY.   11.  Chronic alcohol abuse.   12.  Prior subdural hematoma.   13.  Multiple joints in place with replacements that do not look infected.   14 apparent chronic low grade lymphoma, status unclear  15 Prior PE and antiphospholipid syndrome     RECOMMENDATIONS:   1.  Ancef at renal reduced dosed 2 g q. 12 hours. If improves further increase to q8  2.  Blood cultures x 2 now and serial blood cultures daily. So far neg  3.  Given the multiple valve abnormalities and high-grade bacteremia, assuming no  "contraindication exists, at some point would do a transesophageal echocardiogram , if blood clears easily conceivably just tx 4 weeks.   4.  For now, ignoring the urine culture (other than the couple of days of treatment that have been done).   5.  Continue to follow for secondary sites of infection, multiple injured sites and ecchymotic areas and fluid collections in the hip area and multiple joints, but none that are obvious infection.  Currently in the back where he has had pain is imaging negative, so for now simply antibiotics.   6.  Will need prolonged  Antibiotics,  but of course no line placement for now until documented blood clearing. Investigate disposition options            Interval History:   Sleepy no new pain site T down, creat 1.57 Follow-up BC neg so far              Medications:       carvedilol  3.125 mg Oral BID w/meals     ceFAZolin  2 g Intravenous Q12H     cyanocobalamin  1,000 mcg Sublingual Daily     folic acid  1 mg Oral Daily     gabapentin  300 mg Oral BID     multivitamin, therapeutic  1 tablet Oral Daily     ranitidine  150 mg Oral Daily     vitamin B1  100 mg Oral Daily                  Physical Exam:   Blood pressure 100/55, pulse 51, temperature 97.6  F (36.4  C), temperature source Oral, resp. rate 16, height 1.854 m (6' 1\"), weight 107.2 kg (236 lb 5.3 oz), SpO2 96 %.  Wt Readings from Last 2 Encounters:   03/07/19 107.2 kg (236 lb 5.3 oz)   02/20/19 90.7 kg (200 lb)     Vital Signs with Ranges  Temp:  [97.6  F (36.4  C)-97.9  F (36.6  C)] 97.6  F (36.4  C)  Pulse:  [51-63] 51  Heart Rate:  [52-63] 63  Resp:  [16] 16  BP: ()/(55-63) 100/55  SpO2:  [95 %-100 %] 96 %    Constitutional: sleepy cooperative, no apparent distress chronic ill appearance   Lungs: Clear to auscultation bilaterally, no crackles or wheezing   Cardiovascular: Regular rate and rhythm, normal S1 and S2, and no murmur noted   Abdomen: Normal bowel sounds, soft, non-distended, non-tender   Skin: No rashes, " no cyanosis,sig edema ecchymosis, no clear involved joints   Other:           Data:   All microbiology laboratory data reviewed.  Recent Labs   Lab Test 03/06/19  0940 03/03/19  0916 03/02/19  0720   WBC 5.0 8.2 10.3   HGB 7.6* 7.5* 7.4*   HCT 23.8* 23.3* 24.0*    100 102*    212 289     Recent Labs   Lab Test 03/06/19  0940 03/05/19  0815 03/04/19  1037   CR 1.49* 1.57* 1.67*     No lab results found.  Recent Labs   Lab Test 03/06/19  0940 03/05/19  1755 03/05/19  1753 03/03/19  1320 03/03/19  0922 03/03/19  0915 12/07/18  1321 07/24/18  1335 07/23/18  1512   CULT No growth after 15 hours No growth after 2 days No growth after 2 days >100,000 colonies/mL  Enterococcus faecalis  *  <10,000 colonies/mL  urogenital aranza  Susceptibility testing not routinely done   Cultured on the 1st day of incubation:  Staphylococcus aureus  *  Critical Value/Significant Value, preliminary result only, called to and read back by  Jg Reddy RN, @1277 03/03/19.DH.    Susceptibility testing done on previous specimen Cultured on the 1st day of incubation:  Staphylococcus aureus  *  Critical Value/Significant Value, preliminary result only, called to and read back by  Jonas Reddy RN, @9169 03/03/19.DH.    (Note)  POSITIVE for STAPHYLOCOCCUS AUREUS and NEGATIVE for the mecA gene  (not MRSA) by [facility] nucleic acid test. The mecA gene was  not detected. Final identification and antimicrobial susceptibility  testing will be verified by standard methods.    Specimen tested with [facility]ene multiplex, gram-positive blood culture  nucleic acid test for the following targets: Staph aureus, Staph  epidermidis, Staph lugdunensis, other Staph species, Enterococcus  faecalis, Enterococcus faecium, Streptococcus species, S. agalactiae,  S. anginosus grp., S. pneumoniae, S. pyogenes, Listeria sp., mecA  (methicillin resistance) and Rad/B (vancomycin resistance).    Critical Value/Significant Value called  to and read back by Alma Mayfield, RN  3.4.19 GEOVANY.   >100,000 colonies/mL  Enterococcus faecalis  * Cultured on the 1st day of incubation:  Staphylococcus hominis  This isolate is presumed to be clindamycin resistant based on detection of inducible   clindamycin resistance. Erythromycin and clindamycin are resistant, therefore, they are   not recommended for use.  *  Critical Value/Significant Value, preliminary result only, called to and read back by  Ifrah Enamorado RN on SH66 at 1131 7/25/18 SRQ    (Note)  POSITIVE for Staphylococci other than S.aureus, S.epidermidis and  S.lugdunensis, by [facility] multiplex nucleic acid test.  Coagulase-negative staphylococci are the most common venipuncture or  collection associated skin CONTAMINANTS grown in blood cultures.  Final identification and antimicrobial susceptibility testing will be  verified by standard methods.    Specimen tested with Verigene multiplex, gram-positive blood culture  nucleic acid test for the following targets: Staph aureus, Staph  epidermidis, Staph lugdunensis, other Staph species, Enterococcus  faecalis, Enterococcus faecium, Streptococcus species, S. agalactiae,  S. anginosus grp., S. pneumoniae, S. pyogenes, Listeria sp., mecA  (methicillin resistance) and Rad/B (vancomycin resistance).    Critical Value/Significant Value called to and read back by Janet Donis RN, @ 8115 7.25.2018 BL   >100,000 colonies/mL  Klebsiella pneumoniae  *

## 2019-03-07 NOTE — PROGRESS NOTES
[facility]    Hospitalist Progress Note    Assessment & Plan   Antonio Ramirez is a 73 year old male with PMHx of alcoholic liver cirrhosis, CKD, hypertension, CAD with 3V CABG in 2007, afib, HFpEF, hx of DVT and anticardiolipin Ab, hx of UTIs, and hx of traumatic SDH in 4/2018 who was admitted on 2/28/2019 after a fall with resulting buttock pain and LLE hematoma in the setting of anticoagulation with supratherapuetic INR. Hospital course has been complicated by the development of MSSA bacteremia.    Left leg hematoma in the setting of anticoagulation  Fall at home, presumed mechanical in the setting of alcohol abuse  Supratherapeutic INR in the setting of anticoagulation/alcohol use status post reversal  Physical deconditioning due to acute illness/chronic debility  Low Back Pain  Patient brought to the ED via EMS for buttock pain after a fall which had occurred 10d prior. This represented his 3rd visit in recent weeks for complaints of weakness (seen in ED on 2/17 and 2/20). Endorsed he had been mostly bed-bound during the prior 10d and that he was now interested in going to TCU. Had been drinking 4-5 drinks of Vodka daily. Exam was notable for bruising on the buttock and left thigh and LLE edema, consistent with hematoma. General surgery consulted. At time of presentation, INR was 7.45 and he was given vitamin K for reversal. Nonsurgical management recommended. Serial hgbs remained stable.     During the course of his stay, he, underwent additional workup and imaging -- US of LLE showed no evidence of DVT. CT left hip showed multiple intramuscular hematomas throughout the LLE most conspicuously involving the gluteus musculature and posterior compartment. Xray of lumbar spine showed only disc space narrowing. MRI of the lumbar spine showed multiple level degenerative disc disease with severe central canal stenosis at L4-L5 with moderate bilateral neural foraminal stenosis and mod to severe  bilateral neural foraminal stenosis at L5-S1.    -- cont pain control with prn Tylenol -- has prn oxycodone and IV dilaudid available for breakthrough/severe pain but none needed since 3/2  -- evaluated by wound care for skin tears on hematoma  -- cont fall precautions  -- presently off anticoagulation dt hx of multiple falls -- patient aware of stroke risk given underlying hypercoagulable disorder  -- will need TCU stay at discharge    MSSA Bacteremia  Became febrile on 3/3 AM with Tmax 102.5. Denied cough, sob, rash or sx of UTI. UA showed mod leuk est and 23 WBCs. CXR with possible RLL infiltrate. Urine culture grew >100k enterococcus. Blood cultures obtained and subsequently grew MSSA. Source of bacteremia unclear, possibly related to falls.   -- ID following, on Ancef 2g IV q12h -- anticipate 4wk course of treatment if not longer  -- daily blood cultures to confirm clearing of bacteremia (blood cultures from 3/5 remain neg)  -- will eventually need ESPERANZA to evaluate for endocarditis (TTE done earlier this stay, prior to sx of bacteremia)  -- has been adequately treated for enterococcus in the urine     Acute on chronic anemia, likely from hematoma/dilutional, with macrocytosis  Baseline hemoglobin between 7.5-8.5.  Presented with a hemoglobin of 6.3 and was transfused 1U PRBC. Hgbs have since remained stable around 7.5  -- monitor hgb periodically and transfuse if <7  -- have deferred additional workup at this time  -- started on Vitamin B12 supplement  -- as above, given falls and hematoma anticoagulation remains on hold     History of alcohol abuse  Alcoholic liver cirrhosis with ascites, portal hypertension  Drinks approx 1/3rd bottle of vodka a day at home. No withdrawal symptoms on this past hospital admission. Monitored on CIWA protocol on admission. No withdrawal sx observed so this was ultimately stopped.   -- cont MVI, thiamine and folate    Paroxysmal atrial fibrillation on chronic anticoagulation:  In  NSR this stay.   -- conts on Coreg  -- PTA anticoagulation (Xarelto) and aspirin remain on hold given recent falls and resulting hematoma, is not a great candidate for continued anticoagulation given his hx of EtOH abuse and falls and bleeding complications (has noted hx of SDH)  -- anticipate resumption of anticoagulation once patient is no longer a fall risk      Antiphospholipid Antibody Syndrome (AAS), IVC filter in place, on chronic anticoagulation  History of Pulmonary Embolism, Deep Vein Thrombosis:   As above, Xarelto and aspirin held this stay given fall risk  -- will need to discuss whether it is safe to resume these meds in the future when no longer a fall risk -- ?possibly at discharge since he will be going to a TCU     Chronic Systolic CHF EF 40-45%.  Mild valvular aortic stenosis, moderate mitral regurgitation, mild to moderate tricuspid regurgitation, mild ascending aorta dilatation.  CAD with hx of 3V CABG  Thoracic aortic aneurysm without rupture  Hypertension  Hyperlipidemia  PTA meds: Coreg 3.125mg BID, torsemide 20mg daily, aspirin 81mg daily  Troponin on admission 0.030. EKG showed atrial fibrillation with slow ventricular response and nonspecific ST-T wave changes. Patient without chest pain. Echo obtained this stay for evaluation of heart function/valvular abnormalities showed no significant changes compared to previous -- EF 40 45%.   -- conts on Coreg  -- PTA torsemide held, can resume when renal function returns to baseline  -- holding aspirin and anticoagulation as above  -- no on statin dt hx of intolerance    ANSELMO on stage III CKD  Baseline Cr 1.2 - 1.4. Cr 1.96 on admission. Presume prerenal dt blood loss from hematoma. CR improved after initial IVFs  -- monitor renal function, Cr 1.4 today  -- cont holding diuretics for now as not overtly hypervolemic    History of for subdural hematoma status post craniotomy 7/2018  Patient had a mechanical fall in the setting of anticoagulation,  developed a subdural hematoma and had craniotomy and evacuation. Anticoagulation was on hold for a few weeks and later resumed.    GERD:  Not on meds prior to admission  -- BID H2 blocker added this stay      Low grade B cell lymphoproliferative disorder  History of prostate cancer s/p radiative seed implants   No acute issues.      Peripheral polyneuropathy.  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 Appreciate recommendations.     FEN: off IVFs, lytes stable, regular diet  DVT Prophylaxis: PCDs  Code Status: Full Code    Disposition: Discharge date unclear, pending clearance of bacteremia. Suspect several days still. Will need TCU at discharge.    Spouse at bedside and in agreement with care plan.    Omaira Coleman    Interval History   Seen this afternoon. No specific complaints. Denies cp/sob/cough, abd pain/n/v. Asking if his gabapentin can be increased back to his home dose. Seems discouraged that he will be here for a few more days still while awaiting clearing of bacteremia.     -Data reviewed today: I reviewed all new labs and imaging results over the last 24 hours. I personally reviewed no images or EKG's today.    Physical Exam   Temp: 97.6  F (36.4  C) Temp src: Oral BP: 100/55 Pulse: 51 Heart Rate: 63 Resp: 16 SpO2: 96 % O2 Device: None (Room air)    Vitals:    02/28/19 1536 03/07/19 0308   Weight: 90.7 kg (200 lb) 107.2 kg (236 lb 5.3 oz)     Vital Signs with Ranges  Temp:  [97.6  F (36.4  C)-97.9  F (36.6  C)] 97.6  F (36.4  C)  Pulse:  [51-63] 51  Heart Rate:  [55-63] 63  Resp:  [16] 16  BP: ()/(55-63) 100/55  SpO2:  [95 %-100 %] 96 %  I/O last 3 completed shifts:  In: 440 [P.O.:440]  Out: 750 [Urine:750]    Constitutional: Resting comfortably, alert and answering questions appropriately,  NAD  Respiratory: CTAB, no wheeze/rales/rhonchi, no increased work of breathing  Cardiovascular: HRRR with +SM throughout precordium, +bilateral LE edema with lymphedema wraps on  GI: S, NT, ND, +BS  Skin/Integumen: warm/dry, LLE wrapped, did not examine L thigh/buttock hematoma  Other:      Medications       carvedilol  3.125 mg Oral BID w/meals     ceFAZolin  2 g Intravenous Q12H     cyanocobalamin  1,000 mcg Sublingual Daily     folic acid  1 mg Oral Daily     gabapentin  300 mg Oral BID     multivitamin, therapeutic  1 tablet Oral Daily     ranitidine  150 mg Oral Daily     vitamin B1  100 mg Oral Daily       Data   Recent Labs   Lab 03/07/19  0907 03/06/19  0940 03/05/19  0815  03/03/19  0916  03/02/19  0720 03/01/19  1210 02/28/19  1625   WBC  --  5.0  --   --  8.2  --  10.3 6.5 6.8   HGB  --  7.6*  --   --  7.5*  --  7.4* 7.1* 6.3*   MCV  --  100  --   --  100  --  102* 101* 101*   PLT  --  167  --   --  212  --  289 229 229   INR  --   --   --   --   --   --  3.16* 3.85* 7.45*    135 134  --   --    < > 133 132* 131*   POTASSIUM 4.3 4.2 4.4  --   --    < > 5.6* 5.0 5.4*   CHLORIDE 107 109 109  --   --    < > 105 103 101   CO2 22 21 19*  --   --    < > 22 23 26   BUN 34* 39* 42*  --   --    < > 59* 61* 65*   CR 1.39* 1.49* 1.57*   < >  --    < > 1.83* 1.84* 1.96*   ANIONGAP 7 5 6  --   --    < > 6 6 4   BENNIE 8.4* 8.1* 8.0*  --   --    < > 8.5 8.0* 8.5   * 127* 94  --   --    < > 96 123* 111*   ALBUMIN  --   --   --   --   --   --   --   --  2.7*   PROTTOTAL  --   --   --   --   --   --   --   --  6.9   BILITOTAL  --   --   --   --   --   --   --   --  2.9*   ALKPHOS  --   --   --   --   --   --   --   --  90   ALT  --   --   --   --   --   --   --   --  11   AST  --   --   --   --   --   --   --   --  24   TROPI  --   --   --   --   --   --   --   --  0.030    < > = values in this interval not displayed.       No results found for this or any previous visit (from the past 24 hour(s)).

## 2019-03-07 NOTE — PLAN OF CARE
Discharge Planner PT   Patient plan for discharge: Rehab  Current status: Attempted to  Juli Andrei Max-A of 2 x 6. Unable to stand fully erect with assist for setup and cues for momentum. Pt requests to remain up in chair at end of tx, able to use UE to scoot to back of chair.  Edema improving and pt reports good tolerance, B LE re-wrapped with additional wraps up L thigh. 2+ pitting throughout L LE, 2+ pitting on R LE mostly in foot.   Nursing, please removed wraps at 8am on 3/7; PT to return at 9am to assess.  Barriers to return to prior living situation: Decreased activity tolerance, level of assist, fall risk  Recommendations for discharge: TCU  Rationale for recommendations: Pt will benefit from continued therapy to address impairments and increase mobility and functional independence prior to returning home.        Entered by: Mat Redding 03/07/2019 10:03 AM

## 2019-03-08 ENCOUNTER — PATIENT OUTREACH (OUTPATIENT)
Dept: CARE COORDINATION | Facility: CLINIC | Age: 74
End: 2019-03-08

## 2019-03-08 ENCOUNTER — APPOINTMENT (OUTPATIENT)
Dept: PHYSICAL THERAPY | Facility: CLINIC | Age: 74
DRG: 605 | End: 2019-03-08
Payer: MEDICARE

## 2019-03-08 LAB
ANION GAP SERPL CALCULATED.3IONS-SCNC: 6 MMOL/L (ref 3–14)
BUN SERPL-MCNC: 31 MG/DL (ref 7–30)
CALCIUM SERPL-MCNC: 8.5 MG/DL (ref 8.5–10.1)
CHLORIDE SERPL-SCNC: 107 MMOL/L (ref 94–109)
CO2 SERPL-SCNC: 23 MMOL/L (ref 20–32)
CREAT SERPL-MCNC: 1.23 MG/DL (ref 0.66–1.25)
GFR SERPL CREATININE-BSD FRML MDRD: 57 ML/MIN/{1.73_M2}
GLUCOSE SERPL-MCNC: 95 MG/DL (ref 70–99)
HGB BLD-MCNC: 8.4 G/DL (ref 13.3–17.7)
POTASSIUM SERPL-SCNC: 4.6 MMOL/L (ref 3.4–5.3)
SODIUM SERPL-SCNC: 136 MMOL/L (ref 133–144)

## 2019-03-08 PROCEDURE — 36415 COLL VENOUS BLD VENIPUNCTURE: CPT | Performed by: INTERNAL MEDICINE

## 2019-03-08 PROCEDURE — A9270 NON-COVERED ITEM OR SERVICE: HCPCS | Mod: GY | Performed by: HOSPITALIST

## 2019-03-08 PROCEDURE — 25000128 H RX IP 250 OP 636: Performed by: INTERNAL MEDICINE

## 2019-03-08 PROCEDURE — 99233 SBSQ HOSP IP/OBS HIGH 50: CPT | Performed by: INTERNAL MEDICINE

## 2019-03-08 PROCEDURE — 25000132 ZZH RX MED GY IP 250 OP 250 PS 637: Mod: GY | Performed by: HOSPITALIST

## 2019-03-08 PROCEDURE — 87040 BLOOD CULTURE FOR BACTERIA: CPT | Performed by: INTERNAL MEDICINE

## 2019-03-08 PROCEDURE — A9270 NON-COVERED ITEM OR SERVICE: HCPCS | Mod: GY | Performed by: INTERNAL MEDICINE

## 2019-03-08 PROCEDURE — 25000132 ZZH RX MED GY IP 250 OP 250 PS 637: Mod: GY | Performed by: INTERNAL MEDICINE

## 2019-03-08 PROCEDURE — 12000000 ZZH R&B MED SURG/OB

## 2019-03-08 PROCEDURE — 85018 HEMOGLOBIN: CPT | Performed by: INTERNAL MEDICINE

## 2019-03-08 PROCEDURE — 97140 MANUAL THERAPY 1/> REGIONS: CPT | Mod: GP

## 2019-03-08 PROCEDURE — 80048 BASIC METABOLIC PNL TOTAL CA: CPT | Performed by: INTERNAL MEDICINE

## 2019-03-08 RX ORDER — CEFAZOLIN SODIUM 2 G/100ML
2 INJECTION, SOLUTION INTRAVENOUS EVERY 8 HOURS
Status: DISCONTINUED | OUTPATIENT
Start: 2019-03-08 | End: 2019-03-14 | Stop reason: HOSPADM

## 2019-03-08 RX ORDER — BUMETANIDE 0.25 MG/ML
1 INJECTION INTRAMUSCULAR; INTRAVENOUS DAILY
Status: DISCONTINUED | OUTPATIENT
Start: 2019-03-08 | End: 2019-03-09

## 2019-03-08 RX ADMIN — Medication 100 MG: at 08:16

## 2019-03-08 RX ADMIN — FOLIC ACID 1 MG: 1 TABLET ORAL at 08:16

## 2019-03-08 RX ADMIN — OXYCODONE HYDROCHLORIDE 5 MG: 5 TABLET ORAL at 17:22

## 2019-03-08 RX ADMIN — RIVAROXABAN 20 MG: 20 TABLET, FILM COATED ORAL at 17:22

## 2019-03-08 RX ADMIN — BUMETANIDE 1 MG: 0.25 INJECTION INTRAMUSCULAR; INTRAVENOUS at 17:00

## 2019-03-08 RX ADMIN — Medication 1000 MCG: at 08:16

## 2019-03-08 RX ADMIN — MELATONIN 3 MG: 3 TAB ORAL at 22:25

## 2019-03-08 RX ADMIN — OXYCODONE HYDROCHLORIDE 5 MG: 5 TABLET ORAL at 23:49

## 2019-03-08 RX ADMIN — ACETAMINOPHEN 650 MG: 325 TABLET, FILM COATED ORAL at 22:25

## 2019-03-08 RX ADMIN — ACETAMINOPHEN 650 MG: 325 TABLET, FILM COATED ORAL at 17:58

## 2019-03-08 RX ADMIN — RANITIDINE 150 MG: 150 TABLET ORAL at 08:16

## 2019-03-08 RX ADMIN — GABAPENTIN 600 MG: 300 CAPSULE ORAL at 21:00

## 2019-03-08 RX ADMIN — CEFAZOLIN SODIUM 2 G: 2 INJECTION, SOLUTION INTRAVENOUS at 08:15

## 2019-03-08 RX ADMIN — THERA TABS 1 TABLET: TAB at 08:16

## 2019-03-08 RX ADMIN — CARVEDILOL 3.12 MG: 3.12 TABLET, FILM COATED ORAL at 17:58

## 2019-03-08 RX ADMIN — GABAPENTIN 600 MG: 300 CAPSULE ORAL at 08:16

## 2019-03-08 RX ADMIN — CEFAZOLIN SODIUM 2 G: 2 INJECTION, SOLUTION INTRAVENOUS at 17:07

## 2019-03-08 ASSESSMENT — ACTIVITIES OF DAILY LIVING (ADL)
ADLS_ACUITY_SCORE: 29
ADLS_ACUITY_SCORE: 29
ADLS_ACUITY_SCORE: 30
ADLS_ACUITY_SCORE: 29

## 2019-03-08 NOTE — PROGRESS NOTES
"Clinic Care Coordination Contact    Situation: Patient chart reviewed by care coordinator.    Background: Patient was admitted at [facility] on 2/28/19.  Per ED physician's note: \"(...) male with hx of PE in 2006 and PAF on Xarelto, alcohol use with cirrhosis of the liver, who fell 10 days ago on left buttock, has pain and now has hgb of 6.3, suspect bled into buttock area\"  Refer to Patient Outreach note from 2/28/19 for more details.    Assessment: Patient is still hospitalized at [facility]. He will eventually go to [facility], where he was accepted, pending lab results.    Plan/Recommendations: RN CC will review patient's chart in one week.    Diane Chaidez RN Care Coordinator  [facility]  Phone:  [phone] (Mondays, Wednesdays & Fridays)  Phone:  [phone] (Tuesdays & Thursdays)  Email: [email or web address]        "

## 2019-03-08 NOTE — PROGRESS NOTES
[facility]  Infectious Disease Progress Note          Assessment and Plan:     IMPRESSION:   1.  A 73-year-old male with vague possibly several week illness of malaise, fatigue, anemia, multiple falls including ecchymosis and injuries.  No significant fever or perceived infection, then fever spike in the hospital.  Blood cultures 2/2 rapidly positive for Staph aureus.  Conceivably hospital-acquired infection, ecchymosis/injury related infection, but much higher probability this is the actual underlying main primary diagnosis, i.e., Staph aureus bacteremia syndrome explaining his clinical illness of several weeks.   2.  Methicillin-sensitive Staph aureus bacteremia, high grade, possible endocarditis given major valve disease, no clear secondary sites, but multiple pain sites including back pain where imaging was negative.   3.  Positive UA and UC for enterococcus, significance not clear, no actual focal symptoms or anatomic abnormalities.  Unlikely to be relevant.   4.  Acute on chronic renal insufficiency.   5.  Acute on chronic anemia, very likely related to the current acute illness.   6.  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   7.  Multiple valve abnormalities, aortic stenosis, mitral regurgitation and tricuspid regurgitation, loud impressive murmur.   8.  Atrial fibrillation on chronic anticoagulation.   9.  Coronary artery disease.   10.  CIPRO ALLERGY.   11.  Chronic alcohol abuse.   12.  Prior subdural hematoma.   13.  Multiple joints in place with replacements that do not look infected.   14 apparent chronic low grade lymphoma, status unclear  15 Prior PE and antiphospholipid syndrome     RECOMMENDATIONS:   1.  Ancef frequency increase to q 8 as kidney function continues to improve.   2.  Blood cultures repeat from 3/5 so far negative 72 hours old today.   3.  Given the multiple valve abnormalities and high-grade bacteremia,transesophageal  "echocardiogram is needed, if blood clears easily conceivably just tx 4 weeks.   4.  For now, ignoring the urine culture (other than the couple of days of treatment that have been done).   5.  Continue to follow for secondary sites of infection, multiple injured sites and ecchymotic areas and fluid collections in the hip area and multiple joints, but none that are obvious infection.  Currently in the back where he has had pain is imaging negative, so for now simply antibiotics.   6.  Will need prolonged  Antibiotics, wait 48 hours before PICC of midline.             Interval History:   Sleepy no new pain site T down, creat 1.57 Follow-up BC neg so far              Medications:       carvedilol  3.125 mg Oral BID w/meals     ceFAZolin  2 g Intravenous Q12H     cyanocobalamin  1,000 mcg Sublingual Daily     folic acid  1 mg Oral Daily     gabapentin  600 mg Oral BID     multivitamin, therapeutic  1 tablet Oral Daily     ranitidine  150 mg Oral Daily     vitamin B1  100 mg Oral Daily                  Physical Exam:   Blood pressure 99/56, pulse 63, temperature 98  F (36.7  C), temperature source Oral, resp. rate 20, height 1.854 m (6' 1\"), weight 107.2 kg (236 lb 5.3 oz), SpO2 95 %.  Wt Readings from Last 2 Encounters:   03/07/19 107.2 kg (236 lb 5.3 oz)   02/20/19 90.7 kg (200 lb)     Vital Signs with Ranges  Temp:  [97.5  F (36.4  C)-98.2  F (36.8  C)] 98  F (36.7  C)  Pulse:  [53-63] 63  Heart Rate:  [53-59] 59  Resp:  [16-20] 20  BP: ()/(54-68) 99/56  SpO2:  [95 %-99 %] 95 %    Constitutional: sleepy cooperative, no apparent distress chronic ill appearance   Lungs: Clear to auscultation bilaterally, no crackles or wheezing   Cardiovascular: Regular rate and rhythm, normal S1 and S2, and no murmur noted   Abdomen: Normal bowel sounds, soft, non-distended, non-tender   Skin: No rashes, no cyanosis,sig edema ecchymosis, no clear involved joints   Other:           Data:   All microbiology laboratory data " reviewed.  Recent Labs   Lab Test 03/06/19  0940 03/03/19  0916 03/02/19  0720   WBC 5.0 8.2 10.3   HGB 7.6* 7.5* 7.4*   HCT 23.8* 23.3* 24.0*    100 102*    212 289     Recent Labs   Lab Test 03/08/19  0828 03/07/19  0907 03/06/19  0940   CR 1.23 1.39* 1.49*     No lab results found.  Recent Labs   Lab Test 03/08/19  0827 03/07/19  0906 03/06/19  0940 03/05/19  1755 03/05/19  1753 03/03/19  1320 03/03/19  0922 03/03/19  0915 12/07/18  1321   CULT PENDING No growth after 17 hours No growth after 2 days No growth after 3 days No growth after 3 days >100,000 colonies/mL  Enterococcus faecalis  *  <10,000 colonies/mL  urogenital aranza  Susceptibility testing not routinely done   Cultured on the 1st day of incubation:  Staphylococcus aureus  *  Critical Value/Significant Value, preliminary result only, called to and read back by  Jg Reddy RN, @5306 03/03/19.DH.    Susceptibility testing done on previous specimen Cultured on the 1st day of incubation:  Staphylococcus aureus  *  Critical Value/Significant Value, preliminary result only, called to and read back by  Jonas Reddy RN, @0495 03/03/19.DH.    (Note)  POSITIVE for STAPHYLOCOCCUS AUREUS and NEGATIVE for the mecA gene  (not MRSA) by Verigene multiplex nucleic acid test. The mecA gene was  not detected. Final identification and antimicrobial susceptibility  testing will be verified by standard methods.    Specimen tested with Verigene multiplex, gram-positive blood culture  nucleic acid test for the following targets: Staph aureus, Staph  epidermidis, Staph lugdunensis, other Staph species, Enterococcus  faecalis, Enterococcus faecium, Streptococcus species, S. agalactiae,  S. anginosus grp., S. pneumoniae, S. pyogenes, Listeria sp., mecA  (methicillin resistance) and Rad/B (vancomycin resistance).    Critical Value/Significant Value called to and read back by Alma Mayfield RN  3.4.19 GEOVANY.   >100,000  colonies/mL  Enterococcus faecalis  *

## 2019-03-08 NOTE — PROGRESS NOTES
D: FAHEEM following for discharge planning.   I: Pt has been accepted at [facility]. It is a private room and pt understands it is $36 per day extra he will be billed for. His wife can transport him over there.   P: FAHEEM following.     Mary Garcia, MSW, LGSW  [id number]

## 2019-03-08 NOTE — PROGRESS NOTES
[facility]    Hospitalist Progress Note    Assessment & Plan   Antonio Ramirez is a 73 year old male with PMHx of alcoholic liver cirrhosis, CKD, hypertension, CAD with 3V CABG in 2007, afib, HFpEF, hx of DVT and anticardiolipin Ab, hx of L BKA, hx of UTIs, and hx of traumatic SDH in 4/2018 who was admitted on 2/28/2019 after a fall with resulting buttock pain and LLE hematoma in the setting of anticoagulation with supratherapuetic INR.     Hospital course has been complicated by the development of MSSA bacteremia.    Left leg hematoma in the setting of anticoagulation  Fall at home, presumed mechanical in the setting of alcohol abuse  Supratherapeutic INR in the setting of anticoagulation/alcohol use, status post reversal  Physical deconditioning due to acute illness/chronic debility  Low Back Pain  Patient brought to the ED via EMS for buttock pain after a fall which had occurred 10d prior. This represented his 3rd visit in recent weeks for complaints of weakness (seen in ED on 2/17 and 2/20). Endorsed he had been mostly bed-bound during the prior 10d and that he was now interested in going to TCU. Had been drinking 4-5 drinks of Vodka daily. Exam was notable for bruising on the buttock and left thigh and LLE edema, consistent with hematoma. General surgery consulted. At time of presentation, INR was 7.45 and he was given vitamin K for reversal. Nonsurgical management recommended. Serial hgbs remained stable. Evaluated by wound care for skin tears on hematoma.     During the course of his stay, he, underwent additional workup and imaging -- US of LLE showed no evidence of DVT. CT left hip showed multiple intramuscular hematomas throughout the LLE most conspicuously involving the gluteus musculature and posterior compartment. Xray of lumbar spine showed only disc space narrowing. MRI of the lumbar spine showed multiple level degenerative disc disease with severe central canal stenosis at L4-L5 with  moderate bilateral neural foraminal stenosis and mod to severe bilateral neural foraminal stenosis at L5-S1.    -- cont pain control with prn Tylenol -- has prn oxycodone available but none needed since 3/2  -- cont fall precautions  -- will need TCU stay at discharge   -- kept off anticoagulation this stay dt falls, hematoma and anemia but given underlying hypercoagulable disorder will need to resume at some point -- hematoma healing, presently lift dependent with plans to go to TCU at discharge (in a monitored setting thus theoretically decreasing his fall risk) -- will check hgb tonight and resume Xarelto     MSSA Bacteremia  Became febrile on 3/3 AM with Tmax 102.5. Denied cough, sob, rash or sx of UTI. UA showed mod leuk est and 23 WBCs. CXR with possible RLL infiltrate. Urine culture grew >100k enterococcus. Blood cultures obtained and subsequently grew MSSA. Source of bacteremia unclear, possibly related to falls.   -- ID following, on Ancef 2g IV q12h -- given improvement in renal function increased to 2g q8h  -- daily blood cultures to confirm clearing of bacteremia (blood cultures from 3/5 remain neg)  -- given his underlying valve dysfunction as below -- needs ESPERANZA to evaluate for endocarditis (TTE done earlier this stay, prior to dx of bacteremia) -- discussed with Dr. Salazar on 3/8, prefer this be done before discharge so ordered ESPERANZA today, given it is a Friday, I anticipate it won't be able to be done until Monday 3/11  -- has been adequately treated for enterococcus in the urine     Acute on chronic anemia, due to hematoma/dilution, with macrocytosis  Baseline hemoglobin between 7.5-8.5.  Presented with a hemoglobin of 6.3 and was transfused 1U PRBC. Hgbs have since remained stable around 7.5  -- monitor hgb periodically and transfuse if < 7  -- have deferred additional workup at this time  -- started on Vitamin B12 supplement  -- recheck hgb today and if it remains stable will resume anticoagulation  tonight with close monitoring     History of alcohol abuse  Alcoholic liver cirrhosis with ascites, portal hypertension  Drinks approx 1/3rd bottle of vodka a day at home. No withdrawal symptoms on this past hospital admission. Monitored on CIWA protocol on admission. No withdrawal sx observed so this was ultimately stopped.   -- cont MVI, thiamine and folate    Antiphospholipid Antibody Syndrome (AAS), IVC filter in place, on chronic anticoagulation  History of PE and DVT:   As above, Xarelto and aspirin held this stay given fall risk  -- as above, is now in a monitored setting with plans to transition to TCU -- will resume Xarelto tonight (3/8) with close monitoring     Chronic Systolic CHF EF 40-45%.  Severe AS, mod MR, mod to severe TR, mild ascending aorta dilatation.  CAD with hx of 3V CABG  Thoracic aortic aneurysm without rupture  Hypertension  Hyperlipidemia  PTA meds: Coreg 3.125mg BID, torsemide 20mg daily, aspirin 81mg daily  Troponin on admission 0.030. EKG showed atrial fibrillation with slow ventricular response and nonspecific ST-T wave changes. Patient without chest pain. Echo obtained this stay for evaluation of heart function/valvular abnormalities showed no significant changes compared to previous -- EF 40-45%, ongoing severe aortic stenosis, MR and TR.  -- conts on Coreg  -- diuretic held on admission dt worsening renal function -- has significant peripheral edema -- have ordered Bumex 1mg IV daily to begin today, monitor Is/Os  -- will resume Xarelto on 3/8 as above, aspirin remains on hold  -- not on statin dt hx of intolerance    Paroxysmal atrial fibrillation on chronic anticoagulation:  In NSR this stay.   -- conts on Coreg  -- as above, resumed Xarelto on 3/8 -- needs closer monitoring given his hx of falls with resulting bleeding complications    ANSELMO on stage III CKD  Baseline Cr 1.2 - 1.4. Cr 1.96 on admission. Presume prerenal dt blood loss from hematoma. Cr improved after initial  IVFs  -- renal function now back to baseline  -- resumed diuresis on 3/8 with Bumex 1mg IV daily    History of for subdural hematoma status post craniotomy 7/2018  Patient had a mechanical fall in the setting of anticoagulation, developed a subdural hematoma and had craniotomy and evacuation. Anticoagulation was on hold for a few weeks and later resumed.    GERD:  Not on meds prior to admission  -- BID H2 blocker added this stay      Low grade B cell lymphoproliferative disorder  History of prostate cancer s/p radiative seed implants   No acute issues.      Peripheral polyneuropathy.  PTA Gabapentin 600mg BID reduced to 300mg BID given renal injury -- increase back to home dose on 3/8     Pressure injury coccyx  Skin tear on stump of left BKA  Stage I pressure injury to the sacrum  Wound care team consulted. Appreciate recommendations.     FEN: off IVFs, lytes stable, regular diet  DVT Prophylaxis: PCDs  Code Status: Full Code    Disposition: Discharge date unclear, pending stable hgb, clearance of bacteremia and completion of ESPERANZA (likely won't be done until 3/11). Will discharge to [facility].     Omaira Coleman    Interval History   Seen this afternoon. No specific complaints. Denies cp/sob/cough, abd pain/n/v. Appetite okay but doesn't like the food here. Affect is flat.     -Data reviewed today: I reviewed all new labs and imaging results over the last 24 hours. I personally reviewed no images or EKG's today.    Physical Exam   Temp: 98  F (36.7  C) Temp src: Oral BP: 99/56 Pulse: 63 Heart Rate: 59 Resp: 20 SpO2: 95 % O2 Device: None (Room air)    Vitals:    02/28/19 1536 03/07/19 0308   Weight: 90.7 kg (200 lb) 107.2 kg (236 lb 5.3 oz)     Vital Signs with Ranges  Temp:  [98  F (36.7  C)-98.2  F (36.8  C)] 98  F (36.7  C)  Pulse:  [53-63] 63  Heart Rate:  [53-59] 59  Resp:  [16-20] 20  BP: ()/(54-60) 99/56  SpO2:  [95 %-97 %] 95 %  I/O last 3 completed shifts:  In: 480 [P.O.:480]  Out: 325  [Urine:325]    Constitutional: Resting comfortably, alert and answering questions appropriately, NAD  Respiratory: CTAB, no wheeze/rales/rhonchi, no increased work of breathing  Cardiovascular: HRRR with +SM throughout precordium, ++bilateral LE edema extending up to the buttock, lymphedema wraps on  GI: S, NT, ND, +BS  Skin/Integumen: warm/dry, healing ecchymosis noted on L hip and buttock  Other:      Medications       carvedilol  3.125 mg Oral BID w/meals     ceFAZolin  2 g Intravenous Q8H     cyanocobalamin  1,000 mcg Sublingual Daily     folic acid  1 mg Oral Daily     gabapentin  600 mg Oral BID     multivitamin, therapeutic  1 tablet Oral Daily     ranitidine  150 mg Oral Daily     vitamin B1  100 mg Oral Daily       Data   Recent Labs   Lab 03/08/19  0828 03/07/19  0907 03/06/19  0940  03/03/19  0916  03/02/19  0720   WBC  --   --  5.0  --  8.2  --  10.3   HGB  --   --  7.6*  --  7.5*  --  7.4*   MCV  --   --  100  --  100  --  102*   PLT  --   --  167  --  212  --  289   INR  --   --   --   --   --   --  3.16*    136 135   < >  --    < > 133   POTASSIUM 4.6 4.3 4.2   < >  --    < > 5.6*   CHLORIDE 107 107 109   < >  --    < > 105   CO2 23 22 21   < >  --    < > 22   BUN 31* 34* 39*   < >  --    < > 59*   CR 1.23 1.39* 1.49*   < >  --    < > 1.83*   ANIONGAP 6 7 5   < >  --    < > 6   BENNIE 8.5 8.4* 8.1*   < >  --    < > 8.5   GLC 95 107* 127*   < >  --    < > 96    < > = values in this interval not displayed.       No results found for this or any previous visit (from the past 24 hour(s)).

## 2019-03-08 NOTE — PLAN OF CARE
Pt is A/Ox4. VSS on RA, reports saul LE pain (neuropathy), managed with gabapentin. Total care. T/Rq2hr. [facility]. Wound care done per plan of care to the back wound. Saul LE wounds covered by mepilex dressing, c/d/I. L BKA. Lymphedema wraps rewrapped per PT. Edema to saul LE, elevated on pillows. Coccyx redness, skin intact. Voiding per urinal, can be incontinent. +BS, BM x 2, bed pan. Denies nausea. Discharge pending, [facility] has bed available. ID is following. Pt continues on IV Ancef, q12hr. Nursing continue to monitor.

## 2019-03-08 NOTE — PLAN OF CARE
"Discharge Planner PT   Patient plan for discharge: Rehab  Current status: Edema improving and pt reports good tolerance however new bruise on L stump 1\" x 1.5\", pt does not recall hitting stump during transfer, but do not anticipate it is due to wrapping. B LE re-wrapped with additional wraps up R thigh today. 2+ pitting throughout L LE; 1+ pitting on R foot, 2+ up into R thigh. Second set up wraps provided for cleaning or to bring to TCU, plan to use clean wraps tomorrow.   Nursing, please removed wraps at 8am on 3/8; PT to return at 9am to assess if not discharging to TCU.  Further activity deferred due to needing to use bed pan.  Barriers to return to prior living situation: Decreased activity tolerance, level of assist, fall risk  Recommendations for discharge: TCU  Rationale for recommendations: Pt will benefit from continued therapy to address impairments and increase mobility and functional independence prior to returning home.        Entered by: Mat Redding 03/08/2019 4:56 PM      "

## 2019-03-08 NOTE — PLAN OF CARE
Pt is A&O x 4. VSS RA, denied pain. Total cares, Turn and reposition Q 2 hours. Agitated and uncooperative for cares at times. Lymphadema wrap on BLE, dressing to medial spine CDI. Continues to receive IV abx. SW following, Anticipated discharge to [facility] on 3/8. Continue to monitor.

## 2019-03-09 ENCOUNTER — APPOINTMENT (OUTPATIENT)
Dept: PHYSICAL THERAPY | Facility: CLINIC | Age: 74
DRG: 605 | End: 2019-03-09
Payer: MEDICARE

## 2019-03-09 LAB
ANION GAP SERPL CALCULATED.3IONS-SCNC: 6 MMOL/L (ref 3–14)
BUN SERPL-MCNC: 30 MG/DL (ref 7–30)
CALCIUM SERPL-MCNC: 8.3 MG/DL (ref 8.5–10.1)
CHLORIDE SERPL-SCNC: 108 MMOL/L (ref 94–109)
CO2 SERPL-SCNC: 22 MMOL/L (ref 20–32)
CREAT SERPL-MCNC: 1.22 MG/DL (ref 0.66–1.25)
GFR SERPL CREATININE-BSD FRML MDRD: 57 ML/MIN/{1.73_M2}
GLUCOSE SERPL-MCNC: 82 MG/DL (ref 70–99)
HGB BLD-MCNC: 8.4 G/DL (ref 13.3–17.7)
POTASSIUM SERPL-SCNC: 4.4 MMOL/L (ref 3.4–5.3)
SODIUM SERPL-SCNC: 136 MMOL/L (ref 133–144)

## 2019-03-09 PROCEDURE — A9270 NON-COVERED ITEM OR SERVICE: HCPCS | Mod: GY | Performed by: INTERNAL MEDICINE

## 2019-03-09 PROCEDURE — 25000128 H RX IP 250 OP 636: Performed by: INTERNAL MEDICINE

## 2019-03-09 PROCEDURE — 85018 HEMOGLOBIN: CPT | Performed by: INTERNAL MEDICINE

## 2019-03-09 PROCEDURE — 97110 THERAPEUTIC EXERCISES: CPT | Mod: GP

## 2019-03-09 PROCEDURE — A9270 NON-COVERED ITEM OR SERVICE: HCPCS | Mod: GY | Performed by: HOSPITALIST

## 2019-03-09 PROCEDURE — 25000132 ZZH RX MED GY IP 250 OP 250 PS 637: Mod: GY | Performed by: INTERNAL MEDICINE

## 2019-03-09 PROCEDURE — 97140 MANUAL THERAPY 1/> REGIONS: CPT | Mod: GP

## 2019-03-09 PROCEDURE — 99233 SBSQ HOSP IP/OBS HIGH 50: CPT | Performed by: INTERNAL MEDICINE

## 2019-03-09 PROCEDURE — 12000000 ZZH R&B MED SURG/OB

## 2019-03-09 PROCEDURE — 97530 THERAPEUTIC ACTIVITIES: CPT | Mod: GP

## 2019-03-09 PROCEDURE — 25000132 ZZH RX MED GY IP 250 OP 250 PS 637: Mod: GY | Performed by: HOSPITALIST

## 2019-03-09 PROCEDURE — 36415 COLL VENOUS BLD VENIPUNCTURE: CPT | Performed by: INTERNAL MEDICINE

## 2019-03-09 PROCEDURE — 80048 BASIC METABOLIC PNL TOTAL CA: CPT | Performed by: INTERNAL MEDICINE

## 2019-03-09 RX ORDER — FLUTICASONE PROPIONATE 50 MCG
1 SPRAY, SUSPENSION (ML) NASAL DAILY
Status: DISCONTINUED | OUTPATIENT
Start: 2019-03-09 | End: 2019-03-14 | Stop reason: HOSPADM

## 2019-03-09 RX ORDER — BUMETANIDE 0.25 MG/ML
1 INJECTION INTRAMUSCULAR; INTRAVENOUS 2 TIMES DAILY
Status: DISCONTINUED | OUTPATIENT
Start: 2019-03-09 | End: 2019-03-13

## 2019-03-09 RX ADMIN — OXYCODONE HYDROCHLORIDE 5 MG: 5 TABLET ORAL at 21:58

## 2019-03-09 RX ADMIN — FLUTICASONE PROPIONATE 1 SPRAY: 50 SPRAY, METERED NASAL at 16:24

## 2019-03-09 RX ADMIN — BUMETANIDE 1 MG: 0.25 INJECTION INTRAMUSCULAR; INTRAVENOUS at 21:48

## 2019-03-09 RX ADMIN — BUMETANIDE 1 MG: 0.25 INJECTION INTRAMUSCULAR; INTRAVENOUS at 09:47

## 2019-03-09 RX ADMIN — THERA TABS 1 TABLET: TAB at 08:45

## 2019-03-09 RX ADMIN — GABAPENTIN 600 MG: 300 CAPSULE ORAL at 08:46

## 2019-03-09 RX ADMIN — CEFAZOLIN SODIUM 2 G: 2 INJECTION, SOLUTION INTRAVENOUS at 08:03

## 2019-03-09 RX ADMIN — OXYCODONE HYDROCHLORIDE 5 MG: 5 TABLET ORAL at 13:41

## 2019-03-09 RX ADMIN — RANITIDINE 150 MG: 150 TABLET ORAL at 08:45

## 2019-03-09 RX ADMIN — CEFAZOLIN SODIUM 2 G: 2 INJECTION, SOLUTION INTRAVENOUS at 00:00

## 2019-03-09 RX ADMIN — FOLIC ACID 1 MG: 1 TABLET ORAL at 08:46

## 2019-03-09 RX ADMIN — CEFAZOLIN SODIUM 2 G: 2 INJECTION, SOLUTION INTRAVENOUS at 16:24

## 2019-03-09 RX ADMIN — Medication 100 MG: at 08:46

## 2019-03-09 RX ADMIN — GABAPENTIN 600 MG: 300 CAPSULE ORAL at 21:48

## 2019-03-09 RX ADMIN — Medication 1000 MCG: at 08:45

## 2019-03-09 ASSESSMENT — ACTIVITIES OF DAILY LIVING (ADL)
ADLS_ACUITY_SCORE: 29
ADLS_ACUITY_SCORE: 30

## 2019-03-09 NOTE — PLAN OF CARE
Pt is A/Ox4. VSS on RA, except bradycardic (48-52), Coreg held this morning. Pt reports saul LE pain (neuropathy), managed with gabapentin and oxycodone x 1 given this afternoon. Total care. T/Rq2hr. Up to the chair, mech lift used. Wound care done per plan of care to Saul LE wounds , dressings changed, c/d/I. L BKA. Lymphedema wraps rewrapped per PT, R lymphedema wrap off, due to increase in scrotal edema. Generalized edema, pt complains of abdominal distension, paracentesis ordered (Xarelto needs to be held this evening). Coccyx redness, skin intact. Voiding per urinal, can be incontinent. +BS, no BM this shift, uses bed pan. Denies nausea. Discharge pending, [facility] has bed available. ID is following. Pt continues on IV Ancef, q12hr. Bumex changed to BID from daily. Nursing continue to monitor.

## 2019-03-09 NOTE — PLAN OF CARE
Discharge Planner PT    Patient plan for discharge: Rehab  Current status: Edema improving and pt reports good tolerance, reports left leg pain due to neuropathic pain.  B LE re-wrapped up R thigh. 2+ pitting throughout L LE; 1+ pitting on R foot, 2+ up into R thigh. Noted scrotal edema, discussed with RN to apply towel roll under scrotum to assist with reduction. Trunk pitting edema also noted-RN reports appears same level as yesterday-per chart plans for paracentesis tomorrow.    Nursing, please removed wraps at 8am on 3/10; PT to return at 9am to assess. Advised pt to bring in his  and prosthesis today to assess for fit tomorrow. Discussed with pt and RN signs of intolerance and when to remove wraps.   Supine to sit with SBA with max elevated HOB and railing. Pt able to sit at EOB with SBA to min A for balance with bilateral UE support on bed and perform bilateral LE ex. Sit to stand x 4 reps total with mod A x 2 at EOB with bilateral railings. Able to fully clear gluts 2 reps and stand at most 20 sec. Utilized ceiling lift up to chair.   Barriers to return to prior living situation: Decreased activity tolerance, level of assist, fall risk  Recommendations for discharge: TCU  Rationale for recommendations: Pt will benefit from continued therapy to address impairments and increase mobility and functional independence prior to returning home.     Addendum [id number] : Discussed increase scrotal edema with Dr. Coleman (likely exacerbated by wraps), plan to maintain wrap over left LE/stump and remove right LE lymph wrap to decrease aggressiveness of lymph wrapping. Revisited pt at bedside and removed right LE wraps. Pt's wife entered room with prosthesis at time of encounter. Attempted to tosha prosthesis and was able to fit over lymph wrap. Will return tomorrow with reassessment of left BKA without wrap and prosthesis fitting in hopes to utilize prosthesis with mobility training. Discussed plan with RN.          Entered by: Natalee Hardy 03/09/2019 10:35 AM

## 2019-03-09 NOTE — PROGRESS NOTES
SW:  D: FAHEEM LVM for [facility] asking what time they would like pt to come over today. Awaiting RTC.    P: FAHEEM will continue to follow for discharge planning.    TREMAYNE Everett

## 2019-03-09 NOTE — PLAN OF CARE
A/Ox4, VSS on RA, ex BP runs low, 100s/50s. Turn/repo. UP with lift, refused to get up to chair for dinner. Lymph wraps on, LEs thigh/hips/scrotum swelling, restarted on IV bumex. On IV ancef q8 hours, will need long-term IV abx per ID. Hbg rechecked this evening 8.4, restarted on xarelto. ESPERANZA scheduled for Monday. Neuropathic pain on gabapentin, also oxy and tylenol for LE pain with good results. Uses urinal, no BM this shift. Regular diet good appetite. Discharge to TCU.

## 2019-03-09 NOTE — PROGRESS NOTES
[facility]  Infectious Disease Progress Note          Assessment and Plan:     IMPRESSION:   1.  A 73-year-old male with vague possibly several week illness of malaise, fatigue, anemia, multiple falls including ecchymosis and injuries.  No significant fever or perceived infection, then fever spike in the hospital.  Blood cultures 2/2 rapidly positive for Staph aureus.  Conceivably hospital-acquired infection, ecchymosis/injury related infection, but much higher probability this is the actual underlying main primary diagnosis, i.e., Staph aureus bacteremia syndrome explaining his clinical illness of several weeks.   2.  Methicillin-sensitive Staph aureus bacteremia, high grade, possible endocarditis given major valve disease, no clear secondary sites, but multiple pain sites including back pain where imaging was negative.   3.  Positive UA and UC for enterococcus, significance not clear, no actual focal symptoms or anatomic abnormalities.  Unlikely to be relevant.   4.  Acute on chronic renal insufficiency.   5.  Acute on chronic anemia, very likely related to the current acute illness.   6.  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   7.  Multiple valve abnormalities, aortic stenosis, mitral regurgitation and tricuspid regurgitation, loud impressive murmur.   8.  Atrial fibrillation on chronic anticoagulation.   9.  Coronary artery disease.   10.  CIPRO ALLERGY.   11.  Chronic alcohol abuse.   12.  Prior subdural hematoma.   13.  Multiple joints in place with replacements that do not look infected.   14 apparent chronic low grade lymphoma, status unclear  15 Prior PE and antiphospholipid syndrome     RECOMMENDATIONS:   1.  Ancef frequency increase to q 8 as kidney function continues to improve.   2.  Blood cultures repeat from 3/5 so far negative 72 hours old today.   3.  Given the multiple valve abnormalities and high-grade bacteremia,transesophageal  "echocardiogram is needed, if blood clears easily conceivably just tx 4 weeks.   4.  For now, ignoring the urine culture (other than the couple of days of treatment that have been done).   5.  Continue to follow for secondary sites of infection, multiple injured sites and ecchymotic areas and fluid collections in the hip area and multiple joints, but none that are obvious infection.  Currently in the back where he has had pain is imaging negative, so for now simply antibiotics.   6.  Will need prolonged  Antibiotics, wait 24 hours before PICC of midline.             Interval History:   Sleepy no new pain site T down, creat 1.57 Follow-up BC neg so far              Medications:       bumetanide  1 mg Intravenous Daily     carvedilol  3.125 mg Oral BID w/meals     ceFAZolin  2 g Intravenous Q8H     cyanocobalamin  1,000 mcg Sublingual Daily     folic acid  1 mg Oral Daily     gabapentin  600 mg Oral BID     multivitamin, therapeutic  1 tablet Oral Daily     ranitidine  150 mg Oral Daily     [START ON 3/10/2019] rivaroxaban ANTICOAGULANT  20 mg Oral Daily with supper     vitamin B1  100 mg Oral Daily                  Physical Exam:   Blood pressure 100/64, pulse (!) 43, temperature 98  F (36.7  C), temperature source Oral, resp. rate 18, height 1.854 m (6' 1\"), weight 107.2 kg (236 lb 5.3 oz), SpO2 95 %.  Wt Readings from Last 2 Encounters:   03/07/19 107.2 kg (236 lb 5.3 oz)   02/20/19 90.7 kg (200 lb)     Vital Signs with Ranges  Temp:  [97.2  F (36.2  C)-98.3  F (36.8  C)] 98  F (36.7  C)  Pulse:  [43-66] 43  Heart Rate:  [47-55] 47  Resp:  [18] 18  BP: ()/(55-64) 100/64  SpO2:  [95 %-99 %] 95 %    Constitutional: sleepy cooperative, no apparent distress chronic ill appearance   Lungs: Clear to auscultation bilaterally, no crackles or wheezing   Cardiovascular: Regular rate and rhythm, normal S1 and S2, and no murmur noted   Abdomen: Normal bowel sounds, soft, non-distended, non-tender   Skin: No rashes, no " cyanosis,sig edema ecchymosis, no clear involved joints   Other:           Data:   All microbiology laboratory data reviewed.  Recent Labs   Lab Test 03/09/19  0819 03/08/19  1647 03/06/19  0940 03/03/19  0916 03/02/19  0720   WBC  --   --  5.0 8.2 10.3   HGB 8.4* 8.4* 7.6* 7.5* 7.4*   HCT  --   --  23.8* 23.3* 24.0*   MCV  --   --  100 100 102*   PLT  --   --  167 212 289     Recent Labs   Lab Test 03/09/19  0819 03/08/19  0828 03/07/19  0907   CR 1.22 1.23 1.39*     No lab results found.  Recent Labs   Lab Test 03/08/19  0827 03/07/19  0906 03/06/19  0940 03/05/19  1755 03/05/19  1753 03/03/19  1320 03/03/19  0922 03/03/19  0915 12/07/18  1321   CULT No growth after 17 hours No growth after 2 days No growth after 3 days No growth after 4 days No growth after 4 days >100,000 colonies/mL  Enterococcus faecalis  *  <10,000 colonies/mL  urogenital aranza  Susceptibility testing not routinely done   Cultured on the 1st day of incubation:  Staphylococcus aureus  *  Critical Value/Significant Value, preliminary result only, called to and read back by  Jg Reddy RN, @[id number] 03/03/19.DH.    Susceptibility testing done on previous specimen Cultured on the 1st day of incubation:  Staphylococcus aureus  *  Critical Value/Significant Value, preliminary result only, called to and read back by  Jonas Reddy RN, @[id number] 03/03/19.DH.    (Note)  POSITIVE for STAPHYLOCOCCUS AUREUS and NEGATIVE for the mecA gene  (not MRSA) by [employer] multiplex nucleic acid test. The mecA gene was  not detected. Final identification and antimicrobial susceptibility  testing will be verified by standard methods.    Specimen tested with Verigene multiplex, gram-positive blood culture  nucleic acid test for the following targets: Staph aureus, Staph  epidermidis, Staph lugdunensis, other Staph species, Enterococcus  faecalis, Enterococcus faecium, Streptococcus species, S. agalactiae,  S. anginosus grp., S. pneumoniae, S. pyogenes,  Listeria sp., mecA  (methicillin resistance) and Rad/B (vancomycin resistance).    Critical Value/Significant Value called to and read back by Alma Mayfield, RN  3.4.19 GEOVANY.   >100,000 colonies/mL  Enterococcus faecalis  *

## 2019-03-09 NOTE — PROGRESS NOTES
[facility]    Hospitalist Progress Note    Assessment & Plan   Antonio Ramirez is a 73 year old male with PMHx of alcoholic liver cirrhosis, CKD, hypertension, CAD with 3V CABG in 2007, afib, HFpEF, hx of DVT and anticardiolipin Ab, hx of L BKA, hx of UTIs, and hx of traumatic SDH in 4/2018 who was admitted on 2/28/2019 after a fall with resulting buttock pain and LLE hematoma in the setting of anticoagulation with supratherapuetic INR.     Hospital course has been complicated by the development of MSSA bacteremia.    Left leg hematoma in the setting of anticoagulation  Fall at home, presumed mechanical in the setting of alcohol abuse  Supratherapeutic INR in the setting of anticoagulation/alcohol use, status post reversal  Physical deconditioning due to acute illness/chronic debility  Low Back Pain  Patient brought to the ED via EMS for buttock pain after a fall which had occurred 10d prior. This represented his 3rd visit in recent weeks for complaints of weakness (seen in ED on 2/17 and 2/20). Endorsed he had been mostly bed-bound during the prior 10d and that he was now interested in going to TCU. Had been drinking 4-5 drinks of Vodka daily. Exam was notable for bruising on the buttock and left thigh and LLE edema, consistent with hematoma. General surgery consulted. At time of presentation, INR was 7.45 and he was given vitamin K for reversal. Nonsurgical management recommended. Serial hgbs remained stable. Evaluated by wound care for skin tears on hematoma.     During the course of his stay, he, underwent additional workup and imaging -- US of LLE showed no evidence of DVT. CT left hip showed multiple intramuscular hematomas throughout the LLE most conspicuously involving the gluteus musculature and posterior compartment. Xray of lumbar spine showed only disc space narrowing. MRI of the lumbar spine showed multiple level degenerative disc disease with severe central canal stenosis at L4-L5 with  moderate bilateral neural foraminal stenosis and mod to severe bilateral neural foraminal stenosis at L5-S1.    -- cont pain control with prn Tylenol -- has prn oxycodone available but none needed since 3/2  -- cont fall precautions  -- will need TCU stay at discharge   -- initially kept off anticoagulation this stay dt falls, hematoma and anemia but given underlying hypercoagulable disorder will need to resume at some point -- hematoma healing and hgbs stable, presently lift dependent with plans to go to TCU at discharge (in a monitored setting thus theoretically decreasing his fall risk) -- Xarelto was resumed on 3/8 as below    MSSA Bacteremia  Became febrile on 3/3 AM with Tmax 102.5. Denied cough, sob, rash or sx of UTI. UA showed mod leuk est and 23 WBCs. CXR with possible RLL infiltrate. Urine culture grew >100k enterococcus. Blood cultures obtained and subsequently grew MSSA. Source of bacteremia unclear, possibly related to falls.   -- ID following, on Ancef 2g IV q12h -- given improvement in renal function increased to 2g q8h  -- daily blood cultures to confirm clearing of bacteremia (blood cultures from 3/5 remain neg)  -- given his underlying valve dysfunction as below -- needs ESPERANZA to evaluate for endocarditis (TTE done earlier this stay, prior to dx of bacteremia) -- discussed with Dr. Salazar on 3/8, prefer ESPERANZA done before discharge so ordered on 3/8 (Friday afternoon) -- anticipate it won't be completed until 3/11   -- has been adequately treated for enterococcus in the urine     Acute on chronic anemia, due to hematoma/dilution, with macrocytosis  Baseline hemoglobin between 7.5-8.5.  Presented with a hemoglobin of 6.3 and was transfused 1U PRBC. Hgbs no remain stable at 8  -- Xarelto resumed 3/8 -- hold dose on 3/9 for paracentesis ordered for 3/10  -- transfuse if hgb < 7  -- started on Vitamin B12 supplement     History of alcohol abuse  Alcoholic liver cirrhosis with ascites, portal  hypertension  Hx of paracentesis, most recently in 12/2018.   Drinks approx 1/3rd bottle of vodka a day at home. No withdrawal symptoms on this past hospital admission. Monitored on CIWA protocol on admission. No withdrawal sx observed so this was ultimately stopped.   -- patient complaining of increased abd distension and requesting paracentesis -- resumed anticoagulation on 3/8, will hold tonights dose of Xarelto and have ordered therapeutic paracentesis for tomorrow (3/10)  -- cont MVI, thiamine and folate     Antiphospholipid Antibody Syndrome (AAS), IVC filter in place, on chronic anticoagulation  History of PE and DVT:   As above, Xarelto and aspirin held this stay given presentation with hematoma and acute blood loss anemia  -- as above, hgbs stable and is now in a monitored setting with plans to transition to TCU, reducing risk of falls -- Xarelto resumed on 3/8     Chronic Systolic CHF EF 40-45%.  Severe AS, mod MR, mod to severe TR, mild ascending aorta dilatation.  CAD with hx of 3V CABG  Thoracic aortic aneurysm without rupture  Hypertension  Hyperlipidemia  PTA meds: Coreg 3.125mg BID, torsemide 20mg daily, aspirin 81mg daily  Troponin on admission 0.030. EKG showed atrial fibrillation with slow ventricular response and nonspecific ST-T wave changes. Patient without chest pain. Echo obtained this stay for evaluation of heart function/valvular abnormalities showed no significant changes compared to previous -- EF 40-45%, ongoing severe aortic stenosis, MR and TR.  -- conts on Coreg with hold parameters  -- diuretic held on admission dt worsening renal function; renal function improved and was noted to have significant peripheral edema, resumed diuresis with Bumex 1mg IV daily on 3/8  -- given ongoing edema and stable renal function, increased Bumex to 1mg IV BID on 3/9  -- monitor Is/Os  -- cont lymphedema wraps -- discussed with PT and RN, given worsening scrotal edema (likely exacerbated by wraps),  will remove RLE wrap and keep wrap on LLE stump -- hope to improve edema so that he will be able to resume wearing his prosthesis  -- resumed Xarelto on 3/8 as above, aspirin remains on hold  -- not on statin dt hx of intolerance    Paroxysmal atrial fibrillation on chronic anticoagulation:  In NSR this stay.   -- conts on Coreg  -- as above, resumed Xarelto on 3/8    ANSELMO on stage III CKD  Baseline Cr 1.2 - 1.4. Cr 1.96 on admission. Presume prerenal dt blood loss from hematoma. Cr improved after initial IVFs. Diuretics remained on hold and renal function subsequently returned to baseline  -- resumed diuresis on 3/8 with Bumex 1mg IV daily  -- monitor daily BMP    History of for subdural hematoma status post craniotomy 7/2018  Patient had a mechanical fall in the setting of anticoagulation, developed a subdural hematoma and had craniotomy and evacuation. Anticoagulation was on hold for a few weeks and later resumed.    GERD:  Not on meds prior to admission  -- BID H2 blocker added this stay      Low grade B cell lymphoproliferative disorder  History of prostate cancer s/p radiative seed implants   No acute issues.      Peripheral polyneuropathy.  PTA Gabapentin 600mg BID reduced to 300mg BID given renal injury -- increase back to home dose on 3/8     Pressure injury coccyx  Skin tear on stump of left BKA  Stage I pressure injury to the sacrum  Wound care team consulted. Appreciate recommendations.     FEN: off IVFs, lytes stable, regular diet  DVT Prophylaxis: PCDs  Code Status: Full Code    Disposition: Discharge date unclear, pending stable hgb, clearance of bacteremia and completion of ESPERANZA (likely won't be done until 3/11). Will discharge to [facility].     Omaira Coleman    Interval History   Seen this morning. Affect flat. Not happy to be here in the hospital still. Complaining of increased abdominal distension and asking if he can have a paracentesis. Denies cp/sob/cough.     -Data reviewed today: I  reviewed all new labs and imaging results over the last 24 hours. I personally reviewed no images or EKG's today.    Physical Exam   Temp: 98  F (36.7  C) Temp src: Oral BP: 104/60 Pulse: (!) 43 Heart Rate: (!) 48 Resp: 18 SpO2: 95 % O2 Device: None (Room air)    Vitals:    02/28/19 1536 03/07/19 0308   Weight: 90.7 kg (200 lb) 107.2 kg (236 lb 5.3 oz)     Vital Signs with Ranges  Temp:  [97.2  F (36.2  C)-98.3  F (36.8  C)] 98  F (36.7  C)  Pulse:  [43-66] 43  Heart Rate:  [48-55] 48  Resp:  [18] 18  BP: ()/(55-60) 104/60  SpO2:  [95 %-99 %] 95 %  I/O last 3 completed shifts:  In: 240 [P.O.:240]  Out: 650 [Urine:650]    Constitutional: Resting comfortably, alert and answering questions appropriately, NAD  Respiratory: CTAB, no wheeze/rales/rhonchi, no increased work of breathing  Cardiovascular: HR bradycardic with +SM throughout precordium, ++bilateral LE edema extending up to the buttock and abdomen, lymphedema wraps on  GI: distended but S, NT, +BS  Skin/Integumen: warm/dry, healing ecchymosis noted on L hip and buttock   Other:      Medications       bumetanide  1 mg Intravenous Daily     carvedilol  3.125 mg Oral BID w/meals     ceFAZolin  2 g Intravenous Q8H     cyanocobalamin  1,000 mcg Sublingual Daily     folic acid  1 mg Oral Daily     gabapentin  600 mg Oral BID     multivitamin, therapeutic  1 tablet Oral Daily     ranitidine  150 mg Oral Daily     rivaroxaban ANTICOAGULANT  20 mg Oral Daily with supper     vitamin B1  100 mg Oral Daily       Data   Recent Labs   Lab 03/09/19  0819 03/08/19  1647 03/08/19  0828 03/07/19  0907 03/06/19  0940  03/03/19  0916   WBC  --   --   --   --  5.0  --  8.2   HGB 8.4* 8.4*  --   --  7.6*  --  7.5*   MCV  --   --   --   --  100  --  100   PLT  --   --   --   --  167  --  212     --  136 136 135   < >  --    POTASSIUM 4.4  --  4.6 4.3 4.2   < >  --    CHLORIDE 108  --  107 107 109   < >  --    CO2 22  --  23 22 21   < >  --    BUN 30  --  31* 34* 39*   < >   --    CR 1.22  --  1.23 1.39* 1.49*   < >  --    ANIONGAP 6  --  6 7 5   < >  --    BENNIE 8.3*  --  8.5 8.4* 8.1*   < >  --    GLC 82  --  95 107* 127*   < >  --     < > = values in this interval not displayed.       No results found for this or any previous visit (from the past 24 hour(s)).

## 2019-03-09 NOTE — PLAN OF CARE
A&O. VSS on RA ex soft BP's. Turn/Repo. Up with A2 and lift, Left BKA. Regular diet. C/o minimal pain, oxycodone givenx1 for 6/10, effective. Lymph wraps on. LEs thigh/hips/scrotum swelling. Mepilex on lower back, CDI. Voiding urinal, no BM this shift. IV-SL with intermittent antibiotics. ESPERANZA scheduled for Monday before discharge to TCU.

## 2019-03-10 ENCOUNTER — APPOINTMENT (OUTPATIENT)
Dept: PHYSICAL THERAPY | Facility: CLINIC | Age: 74
DRG: 605 | End: 2019-03-10
Payer: MEDICARE

## 2019-03-10 LAB
ANION GAP SERPL CALCULATED.3IONS-SCNC: 7 MMOL/L (ref 3–14)
APTT PPP: 48 SEC (ref 22–37)
BUN SERPL-MCNC: 26 MG/DL (ref 7–30)
CALCIUM SERPL-MCNC: 8.4 MG/DL (ref 8.5–10.1)
CHLORIDE SERPL-SCNC: 107 MMOL/L (ref 94–109)
CO2 SERPL-SCNC: 24 MMOL/L (ref 20–32)
CREAT SERPL-MCNC: 1.14 MG/DL (ref 0.66–1.25)
GFR SERPL CREATININE-BSD FRML MDRD: 62 ML/MIN/{1.73_M2}
GLUCOSE SERPL-MCNC: 67 MG/DL (ref 70–99)
HGB BLD-MCNC: 8.6 G/DL (ref 13.3–17.7)
INR PPP: 1.83 (ref 0.86–1.14)
POTASSIUM SERPL-SCNC: 3.8 MMOL/L (ref 3.4–5.3)
SODIUM SERPL-SCNC: 138 MMOL/L (ref 133–144)

## 2019-03-10 PROCEDURE — 12000000 ZZH R&B MED SURG/OB

## 2019-03-10 PROCEDURE — 85018 HEMOGLOBIN: CPT | Performed by: INTERNAL MEDICINE

## 2019-03-10 PROCEDURE — 25000132 ZZH RX MED GY IP 250 OP 250 PS 637: Mod: GY | Performed by: INTERNAL MEDICINE

## 2019-03-10 PROCEDURE — 25000132 ZZH RX MED GY IP 250 OP 250 PS 637: Mod: GY | Performed by: HOSPITALIST

## 2019-03-10 PROCEDURE — 25000128 H RX IP 250 OP 636: Performed by: INTERNAL MEDICINE

## 2019-03-10 PROCEDURE — A9270 NON-COVERED ITEM OR SERVICE: HCPCS | Mod: GY | Performed by: INTERNAL MEDICINE

## 2019-03-10 PROCEDURE — A9270 NON-COVERED ITEM OR SERVICE: HCPCS | Mod: GY | Performed by: HOSPITALIST

## 2019-03-10 PROCEDURE — 85730 THROMBOPLASTIN TIME PARTIAL: CPT | Performed by: INTERNAL MEDICINE

## 2019-03-10 PROCEDURE — 0W9G3ZZ DRAINAGE OF PERITONEAL CAVITY, PERCUTANEOUS APPROACH: ICD-10-PCS | Performed by: PHYSICIAN ASSISTANT

## 2019-03-10 PROCEDURE — 80048 BASIC METABOLIC PNL TOTAL CA: CPT | Performed by: INTERNAL MEDICINE

## 2019-03-10 PROCEDURE — 97140 MANUAL THERAPY 1/> REGIONS: CPT | Mod: GP

## 2019-03-10 PROCEDURE — 99233 SBSQ HOSP IP/OBS HIGH 50: CPT | Performed by: INTERNAL MEDICINE

## 2019-03-10 PROCEDURE — 97530 THERAPEUTIC ACTIVITIES: CPT | Mod: GP

## 2019-03-10 PROCEDURE — 85610 PROTHROMBIN TIME: CPT | Performed by: INTERNAL MEDICINE

## 2019-03-10 PROCEDURE — 36415 COLL VENOUS BLD VENIPUNCTURE: CPT | Performed by: INTERNAL MEDICINE

## 2019-03-10 RX ADMIN — CEFAZOLIN SODIUM 2 G: 2 INJECTION, SOLUTION INTRAVENOUS at 09:42

## 2019-03-10 RX ADMIN — OXYCODONE HYDROCHLORIDE 5 MG: 5 TABLET ORAL at 18:27

## 2019-03-10 RX ADMIN — CEFAZOLIN SODIUM 2 G: 2 INJECTION, SOLUTION INTRAVENOUS at 00:30

## 2019-03-10 RX ADMIN — BUMETANIDE 1 MG: 0.25 INJECTION INTRAMUSCULAR; INTRAVENOUS at 09:10

## 2019-03-10 RX ADMIN — GABAPENTIN 600 MG: 300 CAPSULE ORAL at 20:59

## 2019-03-10 RX ADMIN — Medication 100 MG: at 09:10

## 2019-03-10 RX ADMIN — BUMETANIDE 1 MG: 0.25 INJECTION INTRAMUSCULAR; INTRAVENOUS at 20:59

## 2019-03-10 RX ADMIN — Medication 1000 MCG: at 09:10

## 2019-03-10 RX ADMIN — OXYCODONE HYDROCHLORIDE 5 MG: 5 TABLET ORAL at 13:10

## 2019-03-10 RX ADMIN — CEFAZOLIN SODIUM 2 G: 2 INJECTION, SOLUTION INTRAVENOUS at 16:32

## 2019-03-10 RX ADMIN — RANITIDINE 150 MG: 150 TABLET ORAL at 09:10

## 2019-03-10 RX ADMIN — MELATONIN 3 MG: 3 TAB ORAL at 22:36

## 2019-03-10 RX ADMIN — FLUTICASONE PROPIONATE 1 SPRAY: 50 SPRAY, METERED NASAL at 09:10

## 2019-03-10 RX ADMIN — OXYCODONE HYDROCHLORIDE 5 MG: 5 TABLET ORAL at 22:31

## 2019-03-10 RX ADMIN — GABAPENTIN 600 MG: 300 CAPSULE ORAL at 09:10

## 2019-03-10 RX ADMIN — FOLIC ACID 1 MG: 1 TABLET ORAL at 09:11

## 2019-03-10 RX ADMIN — THERA TABS 1 TABLET: TAB at 09:10

## 2019-03-10 ASSESSMENT — ACTIVITIES OF DAILY LIVING (ADL)
ADLS_ACUITY_SCORE: 29
ADLS_ACUITY_SCORE: 30
ADLS_ACUITY_SCORE: 29
ADLS_ACUITY_SCORE: 30

## 2019-03-10 ASSESSMENT — MIFFLIN-ST. JEOR: SCORE: 1874.88

## 2019-03-10 NOTE — PLAN OF CARE
A&Ox4 but forgetful. Bradycardic, otherwise VSS on RA. Reports phantom limb pain of left leg/foot- Oxycodone given x2. Total care with 2 assist. Turns/repo, up with lift. Lymph wraps intact to Lt stump up to hip area, off for 1 hr today. Scrotal edema remains unchanged barrier cream applied for signs of irritation. Mepilex on coccyx and back changed. Voids in urinal and occasional incontinence due to IV bumex. Continues IV antibiotics Q8hr. Plan is to have paracentesis, ESPERANZA and PICC placement tomorrow. Will require 4 weeks of IV abx outpatient. [facility]. Nursing will continue to monitor

## 2019-03-10 NOTE — PLAN OF CARE
Discharge Planner PT   Patient plan for discharge: TCU  Current status: Increased pitting edema noted on right LE, continues with scrotal edema and trunk pitting edema. Pt currently on diuretics and plans for paracentesis tomorrow. Discussed with Dr. Coleman-will maintain lymph wrapping on the left LE, and continue to hold off on right LE to decrease agressiveness of lymph wrapping. Trialed donning left prosthesis today. Pt able to fit prosthesis today, however given wound present on left LE, will need to clarify if okay to utilize for mobility with wound care service (will need to clarify on Monday as service not available on weekends). Discussed with pt for management of edema control on left LE/stump as pt reports was wearing his neoprene sleeve (with peg) prior at night-discussed with pt concern utilizing sleeve due to peg and current wounds. Pt also reports he as a velcro wrap/sleeve for his left LE (pt not very descriptive-difficult to assess exactly what he is describing, however advised pt to bring it in for tomorrow session to assess. Re-wrapped left LE from base of stump to thigh today.    Nursing, please removed wraps at 9am on 3/11; PT to return at 10am to re-assess.  Discussed with pt signs of intolerance and when to remove wraps and written on white board.  Supine to sit with SBA with max elevated HOB and railing. Pt able to sit at EOB with SBA. Required assist with placement/holding of the urinal in sitting. Sit to stand (without left prosthesis) x 2 reps total with mod A x 2 at elevated EOB with bilateral railings. Able to fully clear gluts 2 reps with standing 20-30sec. Sit to supine with min A x 2 and repo in bed with min to mod A x 2.   Barriers to return to prior living situation: Decreased activity tolerance, level of assist, fall risk  Recommendations for discharge: TCU  Rationale for recommendations: Pt will benefit from continued therapy to address impairments and increase mobility and  functional independence prior to returning home.            Entered by: Natalee Hardy 03/10/2019 1:05 PM

## 2019-03-10 NOTE — PLAN OF CARE
Pt AO (forgetful), VSS on RA ex yelena - HR in upper 40s (Coreg held). Denies pain. Total care. Turns/repo, up with lift. Drsg on BLE CDI. L leg with lymphedema wrap. Significant scrotal edema. Mepilex on coccyx and back. Voids in urinal. IV atbx. Discharge pending. Continue to monitor.

## 2019-03-10 NOTE — PROGRESS NOTES
[facility]    Hospitalist Progress Note    Assessment & Plan   Antonio Ramirez is a 73 year old male with PMHx of alcoholic liver cirrhosis, CKD, hypertension, CAD with 3V CABG in 2007, afib, HFpEF, hx of DVT and anticardiolipin Ab, hx of L BKA, hx of UTIs, and hx of traumatic SDH in 4/2018 who was admitted on 2/28/2019 after a fall with resulting buttock pain and LLE hematoma in the setting of anticoagulation with supratherapuetic INR.     Hospital course has been complicated by the development of MSSA bacteremia.    Left leg hematoma in the setting of anticoagulation  Fall at home, presumed mechanical in the setting of alcohol abuse  Supratherapeutic INR in the setting of anticoagulation/alcohol use, status post reversal  Physical deconditioning due to acute illness/chronic debility  Low Back Pain: Improved  Patient brought to the ED via EMS for buttock pain after a fall which had occurred 10d prior. This represented his 3rd visit in recent weeks for complaints of weakness (seen in ED on 2/17 and 2/20). Endorsed he had been mostly bed-bound during the prior 10d and that he was now interested in going to TCU. Had been drinking 4-5 drinks of Vodka daily. Exam was notable for bruising on the buttock and left thigh and LLE edema, consistent with hematoma. General surgery consulted. At time of presentation, INR was 7.45 and he was given vitamin K for reversal. Nonsurgical management recommended. Serial hgbs remained stable. Evaluated by wound care for skin tears on hematoma.     During the course of his stay, he, underwent additional workup and imaging -- US of LLE showed no evidence of DVT. CT left hip showed multiple intramuscular hematomas throughout the LLE most conspicuously involving the gluteus musculature and posterior compartment. Xray of lumbar spine showed only disc space narrowing. MRI of the lumbar spine showed multiple level degenerative disc disease with severe central canal stenosis at  L4-L5 with moderate bilateral neural foraminal stenosis and mod to severe bilateral neural foraminal stenosis at L5-S1.    -- cont pain control with prn Tylenol and prn oxycodone  -- cont fall precautions  -- will need TCU stay at discharge   -- initially kept off anticoagulation this stay dt falls, hematoma and anemia but given underlying hypercoagulable disorder will need to resume at some point -- hematoma healing and hgbs stable, presently lift dependent with plans to go to TCU at discharge (in a monitored setting thus theoretically decreasing his fall risk) -- Xarelto was resumed on 3/8 as below, held for procedures    MSSA Bacteremia  Became febrile on 3/3 AM with Tmax 102.5. Denied cough, sob, rash or sx of UTI. UA showed mod leuk est and 23 WBCs. CXR with possible RLL infiltrate. Urine culture grew >100k enterococcus (adequately treated during stay). Blood cultures obtained and subsequently grew MSSA. Source of bacteremia unclear, possibly related to falls.   -- ID following, on Ancef 2g IV q8h  -- daily blood cultures to confirm clearing of bacteremia (blood cultures from 3/5 remain neg)  -- given his underlying valve dysfunction as below -- needs ESPERANZA to evaluate for endocarditis (TTE done earlier this stay, prior to dx of bacteremia) -- discussed with Dr. Salazar on 3/8, prefer ESPERANZA done before discharge so ordered on 3/8 (Friday afternoon) -- anticipate it won't be completed until 3/11      Acute on chronic anemia, due to hematoma/dilution, with macrocytosis  Baseline hemoglobin between 7.5-8.5.  Presented with a hemoglobin of 6.3 and was transfused 1U PRBC. Hgbs no remain stable at 8  -- Xarelto resumed 3/8, then held 3/9 - 3/10 given anticipate procedures (paracentesis, ESPERANZA)  -- transfuse if hgb < 7  -- started on Vitamin B12 supplement     History of alcohol abuse  Alcoholic liver cirrhosis with ascites, portal hypertension  Hx of paracentesis, most recently in 12/2018.   Drinks approx 1/3rd bottle of  vodka a day at home. No withdrawal symptoms on this past hospital admission. Monitored on CIWA protocol on admission. No withdrawal sx observed so this was ultimately stopped.   -- patient complaining of increased abd distension and requesting paracentesis on 3/9, abd exam not overly impressive for ascites but patient insisted on having a paracentesis this stay -- as such, paracentesis was ordered on 3/9 -- given it was ordered on a Saturday and was non-emergent, unclear if it will be completed 3/10 or 3/11  -- held Xarelto on 3/9 and 3/10  -- cont MVI, thiamine and folate     Antiphospholipid Antibody Syndrome (AAS), IVC filter in place, on chronic anticoagulation  History of PE and DVT:   As above, Xarelto and aspirin held this stay given presentation with hematoma and acute blood loss anemia  -- as above, hgbs stable and is now in a monitored setting with plans to transition to TCU, reducing risk of falls   -- Xarelto resumed on 3/8; held 3/9 and 3/10 given orders for paracentesis and ESPERANZA  -- have ordered Xarelto to resume on 3/11  -- aspirin remains on hold     Chronic Systolic CHF EF 40-45%.  Severe AS, mod MR, mod to severe TR, mild ascending aorta dilatation.  CAD with hx of 3V CABG  Thoracic aortic aneurysm without rupture  Hypertension  Hyperlipidemia  PTA meds: Coreg 3.125mg BID, torsemide 20mg daily, aspirin 81mg daily  Troponin on admission 0.030. EKG showed atrial fibrillation with slow ventricular response and nonspecific ST-T wave changes. Patient without chest pain. Echo obtained this stay for evaluation of heart function/valvular abnormalities showed no significant changes compared to previous -- EF 40-45%, ongoing severe aortic stenosis, MR and TR.  -- conts on Coreg with hold parameters  -- diuretic held on admission dt worsening renal function; renal function improved and was noted to have significant peripheral edema, resumed diuresis with Bumex 1mg IV daily on 3/8  -- given ongoing LE and  scrotal edema and stable renal function, increased Bumex to 1mg IV BID on 3/9 with good response  -- monitor Is/Os  -- cont lymphedema wraps -- discussed with PT and RN, given worsening scrotal edema (likely exacerbated by wraps), will remove RLE wrap and keep wrap on LLE stump -- hope to improve edema so that he will be able to resume wearing his prosthesis  -- consider scrotal sling to help with ongoing edema  -- resumed Xarelto on 3/8 as above but now on hold for procedures, aspirin remains on hold  -- aspirin on hold this stay  -- not on statin dt hx of intolerance    Paroxysmal atrial fibrillation on chronic anticoagulation:  In NSR this stay.   -- conts on Coreg  -- as above Xarelto was held on admission, resumed on 3/8 then held again from 3/9 - 3/10 as above    ANSELMO on stage III CKD  Baseline Cr 1.2 - 1.4. Cr 1.96 on admission. Presume prerenal dt blood loss from hematoma. Cr improved after initial IVFs. Diuretics remained on hold and renal function subsequently returned to baseline  -- resumed diuresis on 3/8, renal function remains stable  -- monitor daily BMP    History of for subdural hematoma status post craniotomy 7/2018  Patient had a mechanical fall in the setting of anticoagulation, developed a subdural hematoma and had craniotomy and evacuation. Anticoagulation was on hold for a few weeks and later resumed.    GERD:  Not on meds prior to admission  -- BID H2 blocker added this stay      Low grade B cell lymphoproliferative disorder  History of prostate cancer s/p radiative seed implants   No acute issues.      Peripheral polyneuropathy.  PTA Gabapentin 600mg BID reduced to 300mg BID given renal injury -- increased back to home dose on 3/8     Pressure injury coccyx  Skin tear on stump of left BKA  Stage I pressure injury to the sacrum  Wound care team consulted. Appreciate recommendations.     FEN: off IVFs, lytes stable, regular diet  DVT Prophylaxis: PCDs  Code Status: Full Code    Disposition:  Discharge date unclear, pending clearance of bacteremia and completion of ESPERANZA (likely won't be done until 3/11). Will need midline vs PICC prior to discharge. Patient has also requested a paracentesis prior to discharge. Will ultimately discharge to [facility].     Omaira Coleman    Interval History   Seen this morning. Endorses ongoing LE and scrotal edema. Asking when he will have his paracentesis. Denies cp/sob/cough. Working with PT and seeing if his edema is improved enough where he can fit into his LLE prosthesis. Has had good urine output after increasing diuretics to BID dosing yesterday.     -Data reviewed today: I reviewed all new labs and imaging results over the last 24 hours. I personally reviewed no images or EKG's today.    Physical Exam   Temp: 98.2  F (36.8  C) Temp src: Oral BP: 97/54  Heart Rate: (!) 47 Resp: 16 SpO2: 98 % O2 Device: None (Room air)    Vitals:    02/28/19 1536 03/07/19 0308 03/10/19 0643   Weight: 90.7 kg (200 lb) 107.2 kg (236 lb 5.3 oz) 108.6 kg (239 lb 6.7 oz)     Vital Signs with Ranges  Temp:  [97.8  F (36.6  C)-98.2  F (36.8  C)] 98.2  F (36.8  C)  Heart Rate:  [47-59] 47  Resp:  [16] 16  BP: ()/(43-58) 97/54  SpO2:  [97 %-98 %] 98 %  I/O last 3 completed shifts:  In: 275 [P.O.:275]  Out: 1425 [Urine:1425]    Constitutional: Resting comfortably, alert and answering questions appropriately, NAD  Respiratory: CTAB, no wheeze/rales/rhonchi, no increased work of breathing  Cardiovascular: HR bradycardic with ++SM throughout precordium, ++bilateral LE edema extending up to the buttock and abdomen  GI: distended but S, NT, +BS  Skin/Integumen: warm/dry, healing ecchymosis noted on L hip and buttock   Other:      Medications       bumetanide  1 mg Intravenous BID     carvedilol  3.125 mg Oral BID w/meals     ceFAZolin  2 g Intravenous Q8H     cyanocobalamin  1,000 mcg Sublingual Daily     fluticasone  1 spray Both Nostrils Daily     folic acid  1 mg Oral Daily      gabapentin  600 mg Oral BID     multivitamin, therapeutic  1 tablet Oral Daily     ranitidine  150 mg Oral Daily     rivaroxaban ANTICOAGULANT  20 mg Oral Daily with supper     vitamin B1  100 mg Oral Daily       Data   Recent Labs   Lab 03/10/19  0850 03/09/19  0819 03/08/19  1647 03/08/19  0828  03/06/19  0940  03/03/19  0916   WBC  --   --   --   --   --  5.0  --  8.2   HGB 8.6* 8.4* 8.4*  --   --  7.6*  --  7.5*   MCV  --   --   --   --   --  100  --  100   PLT  --   --   --   --   --  167  --  212   INR 1.83*  --   --   --   --   --   --   --     136  --  136   < > 135   < >  --    POTASSIUM 3.8 4.4  --  4.6   < > 4.2   < >  --    CHLORIDE 107 108  --  107   < > 109   < >  --    CO2 PENDING 22  --  23   < > 21   < >  --    BUN PENDING 30  --  31*   < > 39*   < >  --    CR PENDING 1.22  --  1.23   < > 1.49*   < >  --    ANIONGAP PENDING 6  --  6   < > 5   < >  --    BENNIE PENDING 8.3*  --  8.5   < > 8.1*   < >  --    GLC PENDING 82  --  95   < > 127*   < >  --     < > = values in this interval not displayed.       No results found for this or any previous visit (from the past 24 hour(s)).

## 2019-03-10 NOTE — PLAN OF CARE
Pt A&O, forgetful. Paul with HR 55, other VSS on RA. Total care with 2 assist. Turns/repo, up with lift. Lymph wraps intact to Lt stump up to hip area. Scrotal edema remains unchaged. Mepilex on coccyx and back. Voids in urinal and occasional incontinence. Continues on antibiotics. Plan is to have paracentesis today. Discharge pending

## 2019-03-11 ENCOUNTER — APPOINTMENT (OUTPATIENT)
Dept: ULTRASOUND IMAGING | Facility: CLINIC | Age: 74
DRG: 605 | End: 2019-03-11
Attending: INTERNAL MEDICINE
Payer: MEDICARE

## 2019-03-11 ENCOUNTER — ANESTHESIA EVENT (OUTPATIENT)
Dept: GASTROENTEROLOGY | Facility: CLINIC | Age: 74
DRG: 605 | End: 2019-03-11
Payer: MEDICARE

## 2019-03-11 ENCOUNTER — APPOINTMENT (OUTPATIENT)
Dept: PHYSICAL THERAPY | Facility: CLINIC | Age: 74
DRG: 605 | End: 2019-03-11
Payer: MEDICARE

## 2019-03-11 ENCOUNTER — ANESTHESIA (OUTPATIENT)
Dept: GASTROENTEROLOGY | Facility: CLINIC | Age: 74
DRG: 605 | End: 2019-03-11
Payer: MEDICARE

## 2019-03-11 LAB
ANION GAP SERPL CALCULATED.3IONS-SCNC: 6 MMOL/L (ref 3–14)
BACTERIA SPEC CULT: NO GROWTH
BACTERIA SPEC CULT: NO GROWTH
BUN SERPL-MCNC: 24 MG/DL (ref 7–30)
CALCIUM SERPL-MCNC: 8.2 MG/DL (ref 8.5–10.1)
CHLORIDE SERPL-SCNC: 106 MMOL/L (ref 94–109)
CO2 SERPL-SCNC: 27 MMOL/L (ref 20–32)
CREAT SERPL-MCNC: 1.22 MG/DL (ref 0.66–1.25)
GFR SERPL CREATININE-BSD FRML MDRD: 57 ML/MIN/{1.73_M2}
GLUCOSE SERPL-MCNC: 80 MG/DL (ref 70–99)
HGB BLD-MCNC: 8.2 G/DL (ref 13.3–17.7)
Lab: NORMAL
Lab: NORMAL
POTASSIUM SERPL-SCNC: 3.4 MMOL/L (ref 3.4–5.3)
SODIUM SERPL-SCNC: 139 MMOL/L (ref 133–144)
SPECIMEN SOURCE: NORMAL
SPECIMEN SOURCE: NORMAL
UPPER GI ENDOSCOPY: NORMAL

## 2019-03-11 PROCEDURE — 25000132 ZZH RX MED GY IP 250 OP 250 PS 637: Mod: GY | Performed by: INTERNAL MEDICINE

## 2019-03-11 PROCEDURE — 12000000 ZZH R&B MED SURG/OB

## 2019-03-11 PROCEDURE — 85018 HEMOGLOBIN: CPT | Performed by: INTERNAL MEDICINE

## 2019-03-11 PROCEDURE — 25000125 ZZHC RX 250: Performed by: INTERNAL MEDICINE

## 2019-03-11 PROCEDURE — 25800030 ZZH RX IP 258 OP 636: Performed by: NURSE ANESTHETIST, CERTIFIED REGISTERED

## 2019-03-11 PROCEDURE — 40000235 ZZH STATISTIC TELEMETRY

## 2019-03-11 PROCEDURE — 97140 MANUAL THERAPY 1/> REGIONS: CPT | Mod: GP

## 2019-03-11 PROCEDURE — A9270 NON-COVERED ITEM OR SERVICE: HCPCS | Mod: GY | Performed by: HOSPITALIST

## 2019-03-11 PROCEDURE — A9270 NON-COVERED ITEM OR SERVICE: HCPCS | Mod: GY | Performed by: INTERNAL MEDICINE

## 2019-03-11 PROCEDURE — 27211289 ZZ H KIT CATH IV 4FR 8 CM OR 10 CM, POWERWAND QUICK XL

## 2019-03-11 PROCEDURE — 40000857 ZZH STATISTIC TEE INCLUDES SEDATION

## 2019-03-11 PROCEDURE — 25000128 H RX IP 250 OP 636: Performed by: NURSE ANESTHETIST, CERTIFIED REGISTERED

## 2019-03-11 PROCEDURE — 80048 BASIC METABOLIC PNL TOTAL CA: CPT | Performed by: INTERNAL MEDICINE

## 2019-03-11 PROCEDURE — 25000128 H RX IP 250 OP 636: Performed by: INTERNAL MEDICINE

## 2019-03-11 PROCEDURE — 88305 TISSUE EXAM BY PATHOLOGIST: CPT | Mod: 26 | Performed by: INTERNAL MEDICINE

## 2019-03-11 PROCEDURE — 43239 EGD BIOPSY SINGLE/MULTIPLE: CPT | Performed by: INTERNAL MEDICINE

## 2019-03-11 PROCEDURE — 25000125 ZZHC RX 250: Performed by: PHYSICIAN ASSISTANT

## 2019-03-11 PROCEDURE — 36569 INSJ PICC 5 YR+ W/O IMAGING: CPT

## 2019-03-11 PROCEDURE — 88305 TISSUE EXAM BY PATHOLOGIST: CPT | Performed by: INTERNAL MEDICINE

## 2019-03-11 PROCEDURE — 99232 SBSQ HOSP IP/OBS MODERATE 35: CPT | Performed by: STUDENT IN AN ORGANIZED HEALTH CARE EDUCATION/TRAINING PROGRAM

## 2019-03-11 PROCEDURE — 40000863 ZZH STATISTIC RADIOLOGY XRAY, US, CT, MAR, NM

## 2019-03-11 PROCEDURE — 25000132 ZZH RX MED GY IP 250 OP 250 PS 637: Mod: GY | Performed by: HOSPITALIST

## 2019-03-11 PROCEDURE — 0DB68ZX EXCISION OF STOMACH, VIA NATURAL OR ARTIFICIAL OPENING ENDOSCOPIC, DIAGNOSTIC: ICD-10-PCS | Performed by: INTERNAL MEDICINE

## 2019-03-11 PROCEDURE — 0DJ08ZZ INSPECTION OF UPPER INTESTINAL TRACT, VIA NATURAL OR ARTIFICIAL OPENING ENDOSCOPIC: ICD-10-PCS | Performed by: INTERNAL MEDICINE

## 2019-03-11 PROCEDURE — 37000008 ZZH ANESTHESIA TECHNICAL FEE, 1ST 30 MIN: Performed by: INTERNAL MEDICINE

## 2019-03-11 PROCEDURE — 27210190 US PARACENTESIS

## 2019-03-11 PROCEDURE — 36415 COLL VENOUS BLD VENIPUNCTURE: CPT | Performed by: INTERNAL MEDICINE

## 2019-03-11 PROCEDURE — 97530 THERAPEUTIC ACTIVITIES: CPT | Mod: GP

## 2019-03-11 PROCEDURE — 40000010 ZZH STATISTIC ANES STAT CODE-CRNA PER MINUTE: Performed by: INTERNAL MEDICINE

## 2019-03-11 RX ORDER — NALOXONE HYDROCHLORIDE 0.4 MG/ML
.1-.4 INJECTION, SOLUTION INTRAMUSCULAR; INTRAVENOUS; SUBCUTANEOUS
Status: ACTIVE | OUTPATIENT
Start: 2019-03-11 | End: 2019-03-13

## 2019-03-11 RX ORDER — LIDOCAINE HYDROCHLORIDE 40 MG/ML
1.5 SOLUTION TOPICAL ONCE
Status: COMPLETED | OUTPATIENT
Start: 2019-03-11 | End: 2019-03-11

## 2019-03-11 RX ORDER — GLYCOPYRROLATE 0.2 MG/ML
0.1 INJECTION, SOLUTION INTRAMUSCULAR; INTRAVENOUS ONCE
Status: COMPLETED | OUTPATIENT
Start: 2019-03-11 | End: 2019-03-11

## 2019-03-11 RX ORDER — LIDOCAINE HYDROCHLORIDE 10 MG/ML
10 INJECTION, SOLUTION EPIDURAL; INFILTRATION; INTRACAUDAL; PERINEURAL ONCE
Status: COMPLETED | OUTPATIENT
Start: 2019-03-11 | End: 2019-03-11

## 2019-03-11 RX ORDER — LIDOCAINE 40 MG/G
CREAM TOPICAL
Status: DISCONTINUED | OUTPATIENT
Start: 2019-03-11 | End: 2019-03-14 | Stop reason: HOSPADM

## 2019-03-11 RX ORDER — SODIUM CHLORIDE, SODIUM LACTATE, POTASSIUM CHLORIDE, CALCIUM CHLORIDE 600; 310; 30; 20 MG/100ML; MG/100ML; MG/100ML; MG/100ML
INJECTION, SOLUTION INTRAVENOUS CONTINUOUS PRN
Status: DISCONTINUED | OUTPATIENT
Start: 2019-03-11 | End: 2019-03-11

## 2019-03-11 RX ORDER — FLUMAZENIL 0.1 MG/ML
0.2 INJECTION, SOLUTION INTRAVENOUS
Status: DISCONTINUED | OUTPATIENT
Start: 2019-03-11 | End: 2019-03-12

## 2019-03-11 RX ORDER — DEXTROSE MONOHYDRATE 25 G/50ML
25-50 INJECTION, SOLUTION INTRAVENOUS
Status: DISCONTINUED | OUTPATIENT
Start: 2019-03-11 | End: 2019-03-11

## 2019-03-11 RX ORDER — NALOXONE HYDROCHLORIDE 0.4 MG/ML
.1-.4 INJECTION, SOLUTION INTRAMUSCULAR; INTRAVENOUS; SUBCUTANEOUS
Status: DISCONTINUED | OUTPATIENT
Start: 2019-03-11 | End: 2019-03-12

## 2019-03-11 RX ORDER — FENTANYL CITRATE 50 UG/ML
25-50 INJECTION, SOLUTION INTRAMUSCULAR; INTRAVENOUS
Status: ACTIVE | OUTPATIENT
Start: 2019-03-11 | End: 2019-03-12

## 2019-03-11 RX ORDER — PROPOFOL 10 MG/ML
INJECTION, EMULSION INTRAVENOUS CONTINUOUS PRN
Status: DISCONTINUED | OUTPATIENT
Start: 2019-03-11 | End: 2019-03-11

## 2019-03-11 RX ORDER — ALBUMIN (HUMAN) 12.5 G/50ML
12.5 SOLUTION INTRAVENOUS ONCE
Status: DISCONTINUED | OUTPATIENT
Start: 2019-03-11 | End: 2019-03-12

## 2019-03-11 RX ORDER — CEFAZOLIN SODIUM 1 G/3ML
2 INJECTION, POWDER, FOR SOLUTION INTRAMUSCULAR; INTRAVENOUS EVERY 8 HOURS
Qty: 1 EACH | Refills: 1 | Status: SHIPPED | OUTPATIENT
Start: 2019-03-11 | End: 2019-04-07

## 2019-03-11 RX ORDER — FENTANYL CITRATE 50 UG/ML
25 INJECTION, SOLUTION INTRAMUSCULAR; INTRAVENOUS
Status: ACTIVE | OUTPATIENT
Start: 2019-03-11 | End: 2019-03-12

## 2019-03-11 RX ORDER — DEXTROSE MONOHYDRATE 25 G/50ML
25-50 INJECTION, SOLUTION INTRAVENOUS
Status: DISCONTINUED | OUTPATIENT
Start: 2019-03-11 | End: 2019-03-14 | Stop reason: HOSPADM

## 2019-03-11 RX ORDER — LIDOCAINE 40 MG/G
CREAM TOPICAL
Status: DISCONTINUED | OUTPATIENT
Start: 2019-03-11 | End: 2019-03-11 | Stop reason: HOSPADM

## 2019-03-11 RX ORDER — NICOTINE POLACRILEX 4 MG
15-30 LOZENGE BUCCAL
Status: DISCONTINUED | OUTPATIENT
Start: 2019-03-11 | End: 2019-03-11

## 2019-03-11 RX ORDER — SODIUM CHLORIDE 9 MG/ML
INJECTION, SOLUTION INTRAVENOUS CONTINUOUS PRN
Status: DISCONTINUED | OUTPATIENT
Start: 2019-03-11 | End: 2019-03-14 | Stop reason: HOSPADM

## 2019-03-11 RX ORDER — FLUMAZENIL 0.1 MG/ML
0.2 INJECTION, SOLUTION INTRAVENOUS
Status: ACTIVE | OUTPATIENT
Start: 2019-03-11 | End: 2019-03-12

## 2019-03-11 RX ORDER — NICOTINE POLACRILEX 4 MG
15-30 LOZENGE BUCCAL
Status: DISCONTINUED | OUTPATIENT
Start: 2019-03-11 | End: 2019-03-14 | Stop reason: HOSPADM

## 2019-03-11 RX ADMIN — RANITIDINE 150 MG: 150 TABLET ORAL at 11:08

## 2019-03-11 RX ADMIN — OXYCODONE HYDROCHLORIDE 5 MG: 5 TABLET ORAL at 16:14

## 2019-03-11 RX ADMIN — GABAPENTIN 600 MG: 300 CAPSULE ORAL at 11:08

## 2019-03-11 RX ADMIN — PROPOFOL 150 MCG/KG/MIN: 10 INJECTION, EMULSION INTRAVENOUS at 14:54

## 2019-03-11 RX ADMIN — THERA TABS 1 TABLET: TAB at 11:08

## 2019-03-11 RX ADMIN — Medication 100 MG: at 11:08

## 2019-03-11 RX ADMIN — BUMETANIDE 1 MG: 0.25 INJECTION INTRAMUSCULAR; INTRAVENOUS at 11:07

## 2019-03-11 RX ADMIN — OXYCODONE HYDROCHLORIDE 5 MG: 5 TABLET ORAL at 11:23

## 2019-03-11 RX ADMIN — CEFAZOLIN SODIUM 2 G: 2 INJECTION, SOLUTION INTRAVENOUS at 18:18

## 2019-03-11 RX ADMIN — SODIUM CHLORIDE, POTASSIUM CHLORIDE, SODIUM LACTATE AND CALCIUM CHLORIDE: 600; 310; 30; 20 INJECTION, SOLUTION INTRAVENOUS at 14:52

## 2019-03-11 RX ADMIN — CEFAZOLIN SODIUM 2 G: 2 INJECTION, SOLUTION INTRAVENOUS at 00:26

## 2019-03-11 RX ADMIN — Medication 1000 MCG: at 11:08

## 2019-03-11 RX ADMIN — LIDOCAINE HYDROCHLORIDE 10 ML: 10 INJECTION, SOLUTION EPIDURAL; INFILTRATION; INTRACAUDAL; PERINEURAL at 13:52

## 2019-03-11 RX ADMIN — GLYCOPYRROLATE 0.1 MG: 0.2 INJECTION, SOLUTION INTRAMUSCULAR; INTRAVENOUS at 09:17

## 2019-03-11 RX ADMIN — BUMETANIDE 1 MG: 0.25 INJECTION INTRAMUSCULAR; INTRAVENOUS at 18:18

## 2019-03-11 RX ADMIN — CEFAZOLIN SODIUM 2 G: 2 INJECTION, SOLUTION INTRAVENOUS at 11:45

## 2019-03-11 RX ADMIN — LIDOCAINE HYDROCHLORIDE 1.5 ML: 40 SOLUTION TOPICAL at 09:12

## 2019-03-11 RX ADMIN — FOLIC ACID 1 MG: 1 TABLET ORAL at 11:08

## 2019-03-11 RX ADMIN — RIVAROXABAN 20 MG: 20 TABLET, FILM COATED ORAL at 18:17

## 2019-03-11 ASSESSMENT — COPD QUESTIONNAIRES: COPD: 0

## 2019-03-11 ASSESSMENT — LIFESTYLE VARIABLES: TOBACCO_USE: 0

## 2019-03-11 ASSESSMENT — ENCOUNTER SYMPTOMS
DYSRHYTHMIAS: 1
SEIZURES: 0

## 2019-03-11 ASSESSMENT — ACTIVITIES OF DAILY LIVING (ADL)
ADLS_ACUITY_SCORE: 26
ADLS_ACUITY_SCORE: 29
ADLS_ACUITY_SCORE: 28
ADLS_ACUITY_SCORE: 29
ADLS_ACUITY_SCORE: 29
ADLS_ACUITY_SCORE: 28

## 2019-03-11 ASSESSMENT — MIFFLIN-ST. JEOR: SCORE: 1871.88

## 2019-03-11 NOTE — PROGRESS NOTES
1340 Time Out done.    Paracentesis: Pt tolerated well. VSS. 3100 cc cloudy reddish dalia fluid removed from abdomen w/o difficulty. Bandaid applied to site - CDI. No Albumin given per protocol.     1405 Detailed report called to Florence Maravilla RN.  1410 Pt to Endoscopy for next procedure.

## 2019-03-11 NOTE — PROGRESS NOTES
[facility]  Infectious Disease Progress Note          Assessment and Plan:     IMPRESSION:   1.  A 73-year-old male with vague possibly several week illness of malaise, fatigue, anemia, multiple falls including ecchymosis and injuries.  No significant fever or perceived infection, then fever spike in the hospital.  Blood cultures 2/2 rapidly positive for Staph aureus.  Conceivably hospital-acquired infection, ecchymosis/injury related infection, but much higher probability this is the actual underlying main primary diagnosis, i.e., Staph aureus bacteremia syndrome explaining his clinical illness of several weeks.   2.  Methicillin-sensitive Staph aureus bacteremia, high grade, possible endocarditis given major valve disease, no clear secondary sites, but multiple pain sites including back pain where imaging was negative.   3.  Positive UA and UC for enterococcus, significance not clear, no actual focal symptoms or anatomic abnormalities.  Unlikely to be relevant.   4.  Acute on chronic renal insufficiency.   5.  Acute on chronic anemia, very likely related to the current acute illness.   6.  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   7.  Multiple valve abnormalities, aortic stenosis, mitral regurgitation and tricuspid regurgitation, loud impressive murmur.   8.  Atrial fibrillation on chronic anticoagulation.   9.  Coronary artery disease.   10.  CIPRO ALLERGY.   11.  Chronic alcohol abuse.   12.  Prior subdural hematoma.   13.  Multiple joints in place with replacements that do not look infected.   14 apparent chronic low grade lymphoma, status unclear  15 Prior PE and antiphospholipid syndrome     RECOMMENDATIONS:   1.  Ancef  q8 to complete 4 weeks  2.  Blood cultures  Neg in Follow-up place midline  3.  Given the multiple valve abnormalities and high-grade bacteremia, assuming no contraindication exists, at some point would do a transesophageal  "echocardiogram , plan today, if no major issue Ok disposition, orders in            Interval History:   Improved cognition and overall no new pain site T down, creat better Follow-up BC neg FU              Medications:       benzocaine 20%  1-4 spray Mouth/Throat Once     bumetanide  1 mg Intravenous BID     carvedilol  3.125 mg Oral BID w/meals     ceFAZolin  2 g Intravenous Q8H     cyanocobalamin  1,000 mcg Sublingual Daily     fentaNYL  25-50 mcg Intravenous Once within 24 hrs     fluticasone  1 spray Both Nostrils Daily     folic acid  1 mg Oral Daily     gabapentin  600 mg Oral BID     glycopyrrolate  0.1 mg Intravenous Once     lidocaine  1.5 mL Topical Once     midazolam  0.5-1 mg Intravenous Once within 24 hrs     multivitamin, therapeutic  1 tablet Oral Daily     ranitidine  150 mg Oral Daily     rivaroxaban ANTICOAGULANT  20 mg Oral Daily with supper     sodium chloride (PF)  3 mL Intravenous Q8H     sodium chloride (PF)  3 mL Intracatheter Q8H     vitamin B1  100 mg Oral Daily                  Physical Exam:   Blood pressure 101/61, pulse 55, temperature 98.4  F (36.9  C), temperature source Oral, resp. rate 16, height 1.854 m (6' 1\"), weight 108.3 kg (238 lb 12.1 oz), SpO2 98 %.  Wt Readings from Last 2 Encounters:   03/11/19 108.3 kg (238 lb 12.1 oz)   02/20/19 90.7 kg (200 lb)     Vital Signs with Ranges  Temp:  [98.4  F (36.9  C)-99.1  F (37.3  C)] 98.4  F (36.9  C)  Pulse:  [55] 55  Heart Rate:  [60-61] 61  Resp:  [16] 16  BP: (101-110)/(43-61) 101/61  SpO2:  [97 %-98 %] 98 %    Constitutional: improved cooperative, no apparent distress chronic ill appearance   Lungs: Clear to auscultation bilaterally, no crackles or wheezing   Cardiovascular: Regular rate and rhythm, normal S1 and S2, and loud  murmur noted   Abdomen: Normal bowel sounds, soft, non-distended, non-tender   Skin: No rashes, no cyanosis,sig edema ecchymosis, no clear involved joints   Other:           Data:   All microbiology " laboratory data reviewed.  Recent Labs   Lab Test 03/10/19  0850 03/09/19  0819 03/08/19  1647 03/06/19  0940 03/03/19  0916 03/02/19  0720   WBC  --   --   --  5.0 8.2 10.3   HGB 8.6* 8.4* 8.4* 7.6* 7.5* 7.4*   HCT  --   --   --  23.8* 23.3* 24.0*   MCV  --   --   --  100 100 102*   PLT  --   --   --  167 212 289     Recent Labs   Lab Test 03/10/19  0850 03/09/19  0819 03/08/19  0828   CR 1.14 1.22 1.23     No lab results found.  Recent Labs   Lab Test 03/08/19  0827 03/07/19  0906 03/06/19  0940 03/05/19  1755 03/05/19  1753 03/03/19  1320 03/03/19  0922 03/03/19  0915 12/07/18  1321   CULT No growth after 3 days No growth after 4 days No growth after 5 days No growth No growth >100,000 colonies/mL  Enterococcus faecalis  *  <10,000 colonies/mL  urogenital aranza  Susceptibility testing not routinely done   Cultured on the 1st day of incubation:  Staphylococcus aureus  *  Critical Value/Significant Value, preliminary result only, called to and read back by  Jg Reddy RN, @1469 03/03/19.DH.    Susceptibility testing done on previous specimen Cultured on the 1st day of incubation:  Staphylococcus aureus  *  Critical Value/Significant Value, preliminary result only, called to and read back by  Jonas Reddy RN, @5559 03/03/19.DH.    (Note)  POSITIVE for STAPHYLOCOCCUS AUREUS and NEGATIVE for the mecA gene  (not MRSA) by [employer] multiplex nucleic acid test. The mecA gene was  not detected. Final identification and antimicrobial susceptibility  testing will be verified by standard methods.    Specimen tested with [employer] multiplex, gram-positive blood culture  nucleic acid test for the following targets: Staph aureus, Staph  epidermidis, Staph lugdunensis, other Staph species, Enterococcus  faecalis, Enterococcus faecium, Streptococcus species, S. agalactiae,  S. anginosus grp., S. pneumoniae, S. pyogenes, Listeria sp., mecA  (methicillin resistance) and Rad/B (vancomycin resistance).    Critical  Value/Significant Value called to and read back by Alma Mayfield, RN  3.4.19 GEOVANY.   >100,000 colonies/mL  Enterococcus faecalis  *

## 2019-03-11 NOTE — CONSULTS
"[facility]  Gastroenterology Consultation    Antonio Ramirez  [address]   [facility] [id number]  73 year old male    Admission Date/Time: 2/28/2019  Primary Care Provider: Main Hardy    We were asked to see the patient in consultation by Dr. Shaffer for upper endoscopy to rule out esophageal varices prior to ESPERANZA.        HPI:  Antonio Ramirez is a 73 year old male who has a medical history of alcoholic liver cirrhosis, CKD, hypertension, CAD with CABG x 3 2007, atrial fibrillation, DVT and anticardiolipin antibody, L BKA, urinary tract infection, traumatic SDH 4/2018 admitted 2/28 after a fall with buttock pain and LLE hematoma in the setting of anticoagulation.    His hospital course has been complicated by the development of MSSA bacteremia.  He became febrile 3/3.  Urine culture grew >100,000 enterococcus.  Blood cultures grew MSSA.  ID is following.  Patient receiving Ancef 2 g IV BID.  Patient needs ESPERANZA to evaluate for endocarditis.  We are asked to evaluate for esophageal varices prior to ESPERANZA given history of alcoholic liver cirrhosis.    Patient reports continued alcohol use.  He reports having an EGD 5 years ago but is unable to tell me whether he had varices or not.    Patient has a history of paracentesis for recurrent ascites.  Last completed 12/21/2018 with 4.2 L of dalia colored fluid removed.  Repeat paracentesis has been ordered.    Liver function tests 2/28 showed total bilirubin 2.9, AST 24, ALT 11, AP 90.      ROS: A comprehensive ten point review of systems was negative aside from those in mentioned in the HPI.      MEDICATIONS:   No current facility-administered medications on file prior to encounter.   Current Outpatient Medications on File Prior to Encounter:  Camphor-Menthol (MENTHOLATUM EX) Externally apply topically nightly as needed       ALLERGIES:   Allergies   Allergen Reactions     Ciprofloxacin Itching     Severe itching \"like ants were crawling\" "     Hmg-Coa-R Inhibitors Other (See Comments)     Rhabdomyolysis occurred within a couple days       Past Medical History:   Diagnosis Date     Alcoholism (H)      Amputated left leg (H)      Anemia      Anticardiolipin antibody syndrome (H)      Antiphospholipid antibody syndrome (H)      Antiplatelet or antithrombotic long-term use      Aortic root dilatation (H)      Aortic stenosis      Arthritis      Balance problem      Coronary artery disease     CABG 2007: SVG to LAD, SVG to diagonal, SVG to OM; cardiac cath 5/17/18: thrombectomy for restenosis of stents-sent for urgent CABG, cath 5/14/2007: GRECIA x2 to LAD, Grecia to diagonal     Gastro-oesophageal reflux disease      Heart attack (H) 2007    apparently arrested, cardiac X5     Hiatal hernia      Hypercholesterolemia      Hypertension      Ischemic cardiomyopathy      Left foot drop      Liver disease     alcoholic liver disease, alcoholic cirrohsosis, portal hypertension     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)     ?When diagnosed, patient not aware of this     Moderate mitral regurgitation      Monoclonal gammopathy      Neuropathy      Noninfectious ileitis     diverticulitis of colon     Nonrheumatic tricuspid valve regurgitation      Paroxysmal atrial fibrillation (H)      PE (pulmonary embolism) 2006    saddle emboli 2006     Prostate cancer (H) 2000    Treated with radiation (seed implants in 2000) -- no problems since     Pulmonary hypertension (H)      Renal disease     kidney stones     Syncope      Thrombocytopenia (H)      Urethral stricture      Venous thrombosis     IVC filter and lysis procedures 2007       Past Surgical History:   Procedure Laterality Date     AMPUTATE LEG BELOW KNEE Left 04/2014    related to gangrene, started as foot ulcer related to neuropathy     AMPUTATE LEG BELOW KNEE Left 12/4/2017    Procedure: AMPUTATE LEG BELOW KNEE;  Exploration of Left Leg Below Knee Amputation Stump with Ligation of Bleeders.;  Surgeon:  Leandro Arreola MD;  Location:  OR     APPENDECTOMY       APPLY WOUND VAC Left 12/6/2017    Procedure: APPLY WOUND VAC;;  Surgeon: Saima Howard MD;  Location:  SD     ARTHROPLASTY HIP Right 11/27/2018    Procedure: RIGHT TOTAL HIP ARTHROPLASTY;  Surgeon: Saima Howard MD;  Location:  OR     ARTHROPLASTY KNEE Right 9/19/2014    Procedure: ARTHROPLASTY KNEE;  Surgeon: Saima Howard MD;  Location:  OR     CARDIAC SURGERY      CABG     CHOLECYSTECTOMY       COLONOSCOPY       COMBINED CYSTOSCOPY, RETROGRADES, EXCHANGE STENT URETER(S) Left 7/24/2018    Procedure: COMBINED CYSTOSCOPY, RETROGRADES, EXCHANGE STENT URETER(S);  CYSTOSCOPY, BILATERAL RETROGRADES, BILATERAL URETERAL STENT EXCHANGE ;  Surgeon: Matt Cervantes MD;  Location:  OR     CRANIOTOMY Right 4/7/2018    Procedure: CRANIOTOMY;  Right Craniotomy For Subdural Hematoma;  Surgeon: Jono Issa MD;  Location:  OR     CYSTOSCOPY, DILATE URETHRA, COMBINED N/A 10/12/2016    Procedure: COMBINED CYSTOSCOPY, DILATE URETHRA;  Surgeon: Dimitry Bello MD;  Location:  OR     CYSTOSCOPY, REMOVE STENT(S), COMBINED Right 7/24/2018    Procedure: COMBINED CYSTOSCOPY, REMOVE STENT(S);;  Surgeon: Jose Hunter MD;  Location:  OR     ENDOSCOPIC STRIPPING VEIN(S)       esophagogastroduodenoscopy       EYE SURGERY      cataracts     GENITOURINARY SURGERY      Prostate Seen Implants     HERNIA REPAIR      Umbilical     INCISION AND DRAINAGE LOWER EXTREMITY, COMBINED Left 12/1/2017    Procedure: COMBINED INCISION AND DRAINAGE LOWER EXTREMITY;  INCISION AND DRAINAGE OF LEFT BELOW KNEE AMPUTATION ABSCESS, WOUND VAC PLACEMENT;  Surgeon: Saima Howard MD;  Location:  OR     IR IVC FILTER PLACEMENT       IRRIGATION AND DEBRIDEMENT LOWER EXTREMITY, COMBINED Left 12/6/2017    Procedure: COMBINED IRRIGATION AND DEBRIDEMENT LOWER EXTREMITY;  IRRIGATION AND DEBRIDEMENT OF LEFT BELOW KNEE AMPUTATION SITE WITH WOUND VAC  "REPLACEMENT;  Surgeon: Saima Howard MD;  Location:  SD     IRRIGATION AND DEBRIDEMENT LOWER EXTREMITY, COMBINED Left 2017    Procedure: COMBINED IRRIGATION AND DEBRIDEMENT LOWER EXTREMITY;  IRRIGATION AND DEBRIDEMENT OF LEFT BELOW THE KNEE AMPUTATION AND SHORTENING OF THE TIBIA WITH WOUND CLOSURE;  Surgeon: Saima Howard MD;  Location:  OR     LASER HOLMIUM LITHOTRIPSY URETER(S), INSERT STENT, COMBINED Bilateral 2018    Procedure: COMBINED CYSTOSCOPY, URETEROSCOPY, LASER HOLMIUM LITHOTRIPSY URETER(S), INSERT STENT;  CYSTOSCOPY, BILATERAL URETEROSCOPY,  HOLMIUM LASER LITHOTRIPSY, BILATERAL STENT PLACEMENT, STONE BASKETING;  Surgeon: Jose Hunter MD;  Location:  OR     LASER HOLMIUM LITHOTRIPSY URETER(S), INSERT STENT, COMBINED Left 2018    Procedure: COMBINED CYSTOSCOPY, URETEROSCOPY, LASER HOLMIUM LITHOTRIPSY URETER(S), INSERT STENT;  CYSTOSCOPY, LEFT URETEROSCOPY, LEFT LASER HOLMIUM LITHOTRIPSY URETER(S) REMOVAL BILATERAL STENTS;  Surgeon: Jose Hunter MD;  Location:  OR     ORTHOPEDIC SURGERY      (R) knee scope     ORTHOPEDIC SURGERY      total hip      ORTHOPEDIC SURGERY      elbow surgery         SOCIAL HISTORY:  Social History     Tobacco Use     Smoking status: Never Smoker     Smokeless tobacco: Never Used   Substance Use Topics     Alcohol use: Yes     Comment: quit 10/2015; now 3-4 Vodkas/night     Drug use: No       FAMILY HISTORY:  Family History   Problem Relation Age of Onset     Lung Cancer Mother      Heart Failure Father          age 87       PHYSICAL EXAM:   /55 (BP Location: Left arm)   Pulse 55   Temp 98.1  F (36.7  C) (Oral)   Resp 14   Ht 1.854 m (6' 1\")   Wt 108.3 kg (238 lb 12.1 oz)   SpO2 97%   BMI 31.50 kg/m      Constitutional: NAD, comfortable  Cardiovascular: RRR, normal S1 and S2, no r/c/g/m  Respiratory: CTAB  Psychiatric: mentation appears normal and affect normal  Head: Normocephalic. Atraumatic.    Neck: Neck supple. " No adenopathy. Thyroid symmetric, normal size, trachea midline  Eyes:  PERRL, no icterus  ENT: Hearing adequate, pharynx normal without erythema or exudate  Abdomen:   Auscultation: + BS  Appearance: distended  Palpation: non-tender  NEURO: grossly negative  SKIN: no suspicious lesions or rashes  LYMPH:   anterior cervical: no adenopathy  posterior cervical: no adenopathy  supraclavicular: no adenopathy          ADDITIONAL COMMENTS:   I reviewed the patient's new clinical lab test results.   Recent Labs   Lab Test 03/11/19  0855 03/10/19  0850 03/09/19  0819  03/06/19  0940 03/03/19  0916 03/02/19  0720 03/01/19  1210   WBC  --   --   --   --  5.0 8.2 10.3 6.5   HGB 8.2* 8.6* 8.4*   < > 7.6* 7.5* 7.4* 7.1*   MCV  --   --   --   --  100 100 102* 101*   PLT  --   --   --   --  167 212 289 229   INR  --  1.83*  --   --   --   --  3.16* 3.85*    < > = values in this interval not displayed.     Recent Labs   Lab Test 03/11/19  0855 03/10/19  0850 03/09/19  0819    138 136   POTASSIUM 3.4 3.8 4.4   CHLORIDE 106 107 108   CO2 27 24 22   BUN 24 26 30   CR 1.22 1.14 1.22   ANIONGAP 6 7 6   BENNIE 8.2* 8.4* 8.3*   GLC 80 67* 82     Recent Labs   Lab Test 03/03/19  1320 02/28/19  1635 02/28/19  1625 12/27/18  1210 12/13/18  1348 12/13/18  1247  11/29/18  0714  05/22/18  0737   ALBUMIN  --   --  2.7* 2.4*  --  2.6*   < > 3.0*   < > 2.8*   BILITOTAL  --   --  2.9* 1.3  --  2.3*   < > 0.8   < > 1.5*   DBIL  --   --  1.4*  --   --   --   --  0.5*  --  0.7*   ALT  --   --  11 11  --  16   < > 10   < > 16   AST  --   --  24 31  --  47*   < > 25   < > 41   ALKPHOS  --   --  90 94  --  106   < > 65   < > 89   PROTEIN 10* 10*  --   --  Negative  --    < >  --    < >  --     < > = values in this interval not displayed.             .    CONSULTATION ASSESSMENT AND PLAN:      72 yo male with history of alcoholic cirrhosis (actively drinking alcohol) who presented to hospital after a fall and was subsequently found to have MSSA  "bacteremia.  Patient to be evaluated with ESPERANZA, requested to complete EGD to assess for esophageal varices prior to procedure.  Patient reports EGD 5 years ago but is unsure of results.    -  Will arrange for upper endoscopy to assess for varices.  -  Keep npo.    I discussed the patient's findings and plan with Dr. Boyd.          Regina Gonzalez, PAC  [facility]  Office:  [phone] call if needed after 5PM  Cell:  [phone], not available after 5PM at this number    Staff addendum: 73 yom with ETOH cirrhosis, still drinking admitted after a fall. Cardiology planning ESPERANZA due to MSSA bacteremia but would like to screen for varices 1st.     /60   Pulse 55   Temp 98.1  F (36.7  C) (Oral)   Resp 16   Ht 1.854 m (6' 1\")   Wt 108.3 kg (238 lb 12.1 oz)   SpO2 98%   BMI 31.50 kg/m    Gen: awake alert NAD  Abd: S protuberant NT ND      A/P: 73 yom with ETOH cirrhosis  -EGD today to screen for varices  -Chem dep consult  -Low Na diet  -Follow up with Dr. Stratton in hepatology clinic after discharge    Katherin Boyd MD  [facility]  Pager [phone]  Office [phone]      "

## 2019-03-11 NOTE — ANESTHESIA CARE TRANSFER NOTE
Patient: Antonio Ramirez    Procedure(s):  COMBINED ESOPHAGOSCOPY, GASTROSCOPY, DUODENOSCOPY (EGD), BIOPSY SINGLE OR MULTIPLE    Diagnosis: R/O Varices  Diagnosis Additional Information: No value filed.    Anesthesia Type:   MAC     Note:  Airway :Nasal Cannula  Patient transferred to:Phase II  Comments: At end of procedure, spontaneous respirations, patient alert to voice, able to follow commands. Oxygen via nasal cannula at 4 liters per minute to PACU. Oxygen tubing connected to wall O2 in PACU, SpO2, NiBP, and EKG monitors and alarms on and functioning, report on patient's clinical status given to PACU RN, RN questions answered.Handoff Report: Identifed the Patient, Identified the Reponsible Provider, Reviewed the pertinent medical history, Discussed the surgical course, Reviewed Intra-OP anesthesia mangement and issues during anesthesia, Set expectations for post-procedure period and Allowed opportunity for questions and acknowledgement of understanding      Vitals: (Last set prior to Anesthesia Care Transfer)    CRNA VITALS  3/11/2019 1439 - 3/11/2019 1516      3/11/2019             Resp Rate (set):  10                Electronically Signed By: DIPAK Cortez CRNA  March 11, 2019  3:16 PM

## 2019-03-11 NOTE — PLAN OF CARE
Pt A&Ox4 but forgetful. VSS on RA. C/o phantom  pain of left leg/foot- Oxycodone given x1, helpful. Total care with 2 assist. Turns/repo q2hrs, up with lift. Lymph wraps intact to Lt stump up to hip area. Scrotal edema remains unchanged barrier cream applied for signs of irritation. Mepilex on back and right calf cdi. Voids in urinal and occasional incontinence. Continues IV antibiotics Q8hr. Plan is to have paracentesis, ESPERANZA and PICC placement today. Will require 4 weeks of IV abx outpatient. Discharge pending.

## 2019-03-11 NOTE — PLAN OF CARE
PT:  Discharge Planner PT   Patient plan for discharge: TCU  Current status: Pt required SBA for supine to sit, min A of 2 sit to stand with B side rails of the bed and L prosthesis donned. Pt stood x3 for approx 20 sec each trial. Required min A for sit to supine for LEs. PT removed lymph wrap and velcro wrap. RN preformed wound cares and PT re-applied L LE stump wrap and velcro thigh wrap, elevated scrotum with a towel roll for edema control. Nursing, please remove wraps on L LE by 9:00 on 3/12 and do wound cares for L LE wound.  Barriers to return to prior living situation: Falls risk, has not ambulated, needs assist with mobility, deconditioning limiting independence.  Recommendations for discharge: TCU  Rationale for recommendations: Pt would benefit from continued PT to improve strength, balance, mobility to increase independence, reduce falls risk and increase safety before returning home.       Entered by: Marie Nino 03/11/2019 11:05 AM

## 2019-03-11 NOTE — PROGRESS NOTES
ESPERANZA not performed.  Dr. Truong spoke with patient in [facility].  He would like patient to have consult with GI before doing ESPERANZA.  Has history of portal hypertension with possible esophageal varices.  Report given to SHIMON Henriquez.  Patient transferred back to [facility] via cart.

## 2019-03-11 NOTE — ANESTHESIA PREPROCEDURE EVALUATION
Anesthesia Pre-Procedure Evaluation    Patient: Antonio Ramirez   MRN: [id number] : 1945          Preoperative Diagnosis: R/O Varices    Procedure(s):  COMBINED ESOPHAGOSCOPY, GASTROSCOPY, DUODENOSCOPY (EGD)    Past Medical History:   Diagnosis Date     Alcoholism (H)      Amputated left leg (H)      Anemia      Anticardiolipin antibody syndrome (H)      Antiphospholipid antibody syndrome (H)      Antiplatelet or antithrombotic long-term use      Aortic root dilatation (H)      Aortic stenosis      Arthritis      Balance problem      Coronary artery disease     CABG 2007: SVG to LAD, SVG to diagonal, SVG to OM; cardiac cath 18: thrombectomy for restenosis of stents-sent for urgent CABG, cath 2007: GRECIA x2 to LAD, Grecia to diagonal     Gastro-oesophageal reflux disease      Heart attack (H)     apparently arrested, cardiac X5     Hiatal hernia      Hypercholesterolemia      Hypertension      Ischemic cardiomyopathy      Left foot drop      Liver disease     alcoholic liver disease, alcoholic cirrohsosis, portal hypertension     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)     ?When diagnosed, patient not aware of this     Moderate mitral regurgitation      Monoclonal gammopathy      Neuropathy      Noninfectious ileitis     diverticulitis of colon     Nonrheumatic tricuspid valve regurgitation      Paroxysmal atrial fibrillation (H)      PE (pulmonary embolism) 2006    saddle emboli 2006     Prostate cancer (H)     Treated with radiation (seed implants in ) -- no problems since     Pulmonary hypertension (H)      Renal disease     kidney stones     Syncope      Thrombocytopenia (H)      Urethral stricture      Venous thrombosis     IVC filter and lysis procedures      Past Surgical History:   Procedure Laterality Date     AMPUTATE LEG BELOW KNEE Left 2014    related to gangrene, started as foot ulcer related to neuropathy     AMPUTATE LEG BELOW KNEE Left 2017    Procedure:  AMPUTATE LEG BELOW KNEE;  Exploration of Left Leg Below Knee Amputation Stump with Ligation of Bleeders.;  Surgeon: Leandro Arreola MD;  Location:  OR     APPENDECTOMY       APPLY WOUND VAC Left 12/6/2017    Procedure: APPLY WOUND VAC;;  Surgeon: Saima Howard MD;  Location: [facility]     ARTHROPLASTY HIP Right 11/27/2018    Procedure: RIGHT TOTAL HIP ARTHROPLASTY;  Surgeon: Saima Howard MD;  Location:  OR     ARTHROPLASTY KNEE Right 9/19/2014    Procedure: ARTHROPLASTY KNEE;  Surgeon: Saima Howard MD;  Location:  OR     CARDIAC SURGERY      CABG     CHOLECYSTECTOMY       COLONOSCOPY       COMBINED CYSTOSCOPY, RETROGRADES, EXCHANGE STENT URETER(S) Left 7/24/2018    Procedure: COMBINED CYSTOSCOPY, RETROGRADES, EXCHANGE STENT URETER(S);  CYSTOSCOPY, BILATERAL RETROGRADES, BILATERAL URETERAL STENT EXCHANGE ;  Surgeon: Matt Cervantes MD;  Location:  OR     CRANIOTOMY Right 4/7/2018    Procedure: CRANIOTOMY;  Right Craniotomy For Subdural Hematoma;  Surgeon: Jono Issa MD;  Location:  OR     CYSTOSCOPY, DILATE URETHRA, COMBINED N/A 10/12/2016    Procedure: COMBINED CYSTOSCOPY, DILATE URETHRA;  Surgeon: Dimitry Bello MD;  Location:  OR     CYSTOSCOPY, REMOVE STENT(S), COMBINED Right 7/24/2018    Procedure: COMBINED CYSTOSCOPY, REMOVE STENT(S);;  Surgeon: Jose Hunter MD;  Location:  OR     ENDOSCOPIC STRIPPING VEIN(S)       esophagogastroduodenoscopy       EYE SURGERY      cataracts     GENITOURINARY SURGERY      Prostate Seen Implants     HERNIA REPAIR      Umbilical     INCISION AND DRAINAGE LOWER EXTREMITY, COMBINED Left 12/1/2017    Procedure: COMBINED INCISION AND DRAINAGE LOWER EXTREMITY;  INCISION AND DRAINAGE OF LEFT BELOW KNEE AMPUTATION ABSCESS, WOUND VAC PLACEMENT;  Surgeon: Saima Howard MD;  Location:  OR     IR IVC FILTER PLACEMENT       IRRIGATION AND DEBRIDEMENT LOWER EXTREMITY, COMBINED Left 12/6/2017    Procedure: COMBINED  IRRIGATION AND DEBRIDEMENT LOWER EXTREMITY;  IRRIGATION AND DEBRIDEMENT OF LEFT BELOW KNEE AMPUTATION SITE WITH WOUND VAC REPLACEMENT;  Surgeon: Saima Howard MD;  Location: [facility]     IRRIGATION AND DEBRIDEMENT LOWER EXTREMITY, COMBINED Left 12/8/2017    Procedure: COMBINED IRRIGATION AND DEBRIDEMENT LOWER EXTREMITY;  IRRIGATION AND DEBRIDEMENT OF LEFT BELOW THE KNEE AMPUTATION AND SHORTENING OF THE TIBIA WITH WOUND CLOSURE;  Surgeon: Saima Howard MD;  Location:  OR     LASER HOLMIUM LITHOTRIPSY URETER(S), INSERT STENT, COMBINED Bilateral 6/28/2018    Procedure: COMBINED CYSTOSCOPY, URETEROSCOPY, LASER HOLMIUM LITHOTRIPSY URETER(S), INSERT STENT;  CYSTOSCOPY, BILATERAL URETEROSCOPY,  HOLMIUM LASER LITHOTRIPSY, BILATERAL STENT PLACEMENT, STONE BASKETING;  Surgeon: Jose Hunter MD;  Location:  OR     LASER HOLMIUM LITHOTRIPSY URETER(S), INSERT STENT, COMBINED Left 8/14/2018    Procedure: COMBINED CYSTOSCOPY, URETEROSCOPY, LASER HOLMIUM LITHOTRIPSY URETER(S), INSERT STENT;  CYSTOSCOPY, LEFT URETEROSCOPY, LEFT LASER HOLMIUM LITHOTRIPSY URETER(S) REMOVAL BILATERAL STENTS;  Surgeon: Jose Hunter MD;  Location:  OR     ORTHOPEDIC SURGERY      (R) knee scope     ORTHOPEDIC SURGERY      total hip      ORTHOPEDIC SURGERY      elbow surgery       Anesthesia Evaluation     . Pt has had prior anesthetic. Type: General (Glidescope in past)    No history of anesthetic complications          ROS/MED HX    ENT/Pulmonary:      (-) tobacco use, asthma and COPD   Neurologic: Comment: Recent subdural hematoma s/p decompressive craniotomy - 4/2018     (-) seizures, CVA and TIA   Cardiovascular:     (+) hypertension--CAD, -past MI,CABG-stent,2007  2 Drug Eluting Stent .. Taking blood thinners Pt has received instructions: . CHF . . :. dysrhythmias a-fib, valvular problems/murmurs type: AS and MR . pulmonary hypertension, Previous cardiac testing Echodate:2/2019results:The visual ejection fraction is  estimated at 35-40%.  There is mild concentric left ventricular hypertrophy.  There is severe distal anterior, mid-distal septal, and apical wall  hypokinesis.  Flattened septum is consistent with RV pressure/volume overload.  The right ventricle is mild to moderately dilated.  The right ventricular systolic function is mild to moderately reduced.  The left atrium is moderate to severely dilated.  The right atrium is severely dilated.  There is moderate (2+) mitral regurgitation.  There is moderate to severe mitral annular calcification.  There is moderate to mod-severe (2-3+) tricuspid regurgitation.  The right ventricular systolic pressure is approximated at 55mmHg plus the  right atrial pressure.  Dilated IVC (>2.5cm) with <50% respiratory collapse; right atrial pressure is  estimated at 15-20mmHg.  Severe (>55mmHg) pulmonary hypertension is present.  There is sclerosis, calcification, and restriction of the aortic valve opening  compatible with severe aortic stenosis.  The mean AoV pressure gradient is 29mmHg.  The ascending aorta is Moderately dilated.  The rhythm was atrial fibrillation with controlled ventricular rate at rest.  Compared to prior study, there is no significant change.  _____________________________________________________________________________date: results: date: results: date: results:          METS/Exercise Tolerance:     Hematologic:     (+) History of blood clots pt is anticoagulated, Anemia, Other Hematologic Disorder-Cirrhosis related thrombocytopenia      Musculoskeletal:   (+) arthritis, , , -       GI/Hepatic: Comment: Alcoholic cirrhosis with ascites - 3.3 L off on 10/30/2018 via paracentesis  Denies GERD symptoms, no active treatment    (+) GERD hiatal hernia, liver disease,       Renal/Genitourinary:     (+) chronic renal disease, type: CRI,       Endo: Comment: BMI 30    (+) Obesity, .   (-) Type I DM and Type II DM   Psychiatric:     (+) psychiatric history other (comment)  "(Alcoholism - reports 2 vodka mixed drinks per day)      Infectious Disease:         Malignancy:   (+) Malignancy History of Prostate and Lymphoma/Leukemia          Other: Comment: Hydromorphone 2 mg intermittently, per patient, last took 2 weeks ago   (+) H/O Chronic Pain,H/O chronic opiod use ,                         Physical Exam  Normal systems: pulmonary and dental    Airway   Mallampati: II  TM distance: >3 FB  Neck ROM: full    Dental     Cardiovascular   Rhythm and rate: regular and normal  (+) murmur       Pulmonary             Lab Results   Component Value Date    WBC 5.0 03/06/2019    HGB 8.2 (L) 03/11/2019    HCT 23.8 (L) 03/06/2019     03/06/2019    CRP 34.3 (H) 03/01/2019     03/11/2019    POTASSIUM 3.4 03/11/2019    CHLORIDE 106 03/11/2019    CO2 27 03/11/2019    BUN 24 03/11/2019    CR 1.22 03/11/2019    GLC 80 03/11/2019    BENNIE 8.2 (L) 03/11/2019    PHOS 3.6 03/02/2019    MAG 2.6 (H) 03/02/2019    ALBUMIN 2.7 (L) 02/28/2019    PROTTOTAL 6.9 02/28/2019    ALT 11 02/28/2019    AST 24 02/28/2019    ALKPHOS 90 02/28/2019    BILITOTAL 2.9 (H) 02/28/2019    JOVANA 44 12/13/2018    PTT 48 (H) 03/10/2019    INR 1.83 (H) 03/10/2019    FIBR 278 12/04/2017    TSH 0.86 10/31/2018       Preop Vitals  BP Readings from Last 3 Encounters:   03/11/19 111/55   02/20/19 99/48   02/17/19 102/62    Pulse Readings from Last 3 Encounters:   03/10/19 55   02/17/19 68   12/31/18 55      Resp Readings from Last 3 Encounters:   03/11/19 14   02/20/19 20   02/17/19 16    SpO2 Readings from Last 3 Encounters:   03/11/19 97%   02/20/19 98%   02/17/19 99%      Temp Readings from Last 1 Encounters:   03/11/19 36.7  C (98.1  F) (Oral)    Ht Readings from Last 1 Encounters:   02/28/19 1.854 m (6' 1\")      Wt Readings from Last 1 Encounters:   03/11/19 108.3 kg (238 lb 12.1 oz)    Estimated body mass index is 31.5 kg/m  as calculated from the following:    Height as of this encounter: 1.854 m (6' 1\").    Weight as of " this encounter: 108.3 kg (238 lb 12.1 oz).       Anesthesia Plan      History & Physical Review  History and physical reviewed and following examination; no interval change.    ASA Status:  3 .    NPO Status:  > 8 hours    Plan for MAC Reason for MAC:  Deep or markedly invasive procedure (G8)  PONV prophylaxis:  Ondansetron (or other 5HT-3)       Postoperative Care  Postoperative pain management:  IV analgesics.      Consents  Anesthetic plan, risks, benefits and alternatives discussed with:  Patient..                 Jose M Hoyos MD

## 2019-03-11 NOTE — PROGRESS NOTES
GI    EGD: no varices, +portal gastropathy,granular mucosa antrum (biopsied)  Liver disease appears stable    Recs  -OK to proceed with ESPERANZA  -Low Na diet  -f/u path  -Chem dep consult for ongoing ETOH abuse  -Will arrange outpt hepatology follow up    Will sign off. Call with questions.    Katherin Boyd MD  [facility]  Pager [phone]  Office [phone]

## 2019-03-11 NOTE — PROGRESS NOTES
[facility]    Hospitalist Progress Note    Assessment & Plan   Antonio Ramirez is a 73 year old male with PMHx of alcoholic liver cirrhosis, CKD, hypertension, CAD with 3V CABG in 2007, afib, HFpEF, hx of DVT and anticardiolipin Ab, hx of L BKA, hx of UTIs, and hx of traumatic SDH in 4/2018 who was admitted on 2/28/2019 after a fall with resulting buttock pain and LLE hematoma in the setting of anticoagulation with supratherapuetic INR.     Hospital course has been complicated by the development of MSSA bacteremia.    Left leg hematoma in the setting of anticoagulation  Fall at home, presumed mechanical in the setting of alcohol abuse  Supratherapeutic INR in the setting of anticoagulation/alcohol use, status post reversal  Physical deconditioning due to acute illness/chronic debility  Low Back Pain: Improved  Patient brought to the ED via EMS for buttock pain after a fall which had occurred 10d prior. This represented his 3rd visit in recent weeks for complaints of weakness (seen in ED on 2/17 and 2/20). Endorsed he had been mostly bed-bound during the prior 10d and that he was now interested in going to TCU. Had been drinking 4-5 drinks of Vodka daily. Exam was notable for bruising on the buttock and left thigh and LLE edema, consistent with hematoma. General surgery consulted. At time of presentation, INR was 7.45 and he was given vitamin K for reversal. Nonsurgical management recommended. Serial hgbs remained stable. Evaluated by wound care for skin tears on hematoma.     During the course of his stay, he, underwent additional workup and imaging -- US of LLE showed no evidence of DVT. CT left hip showed multiple intramuscular hematomas throughout the LLE most conspicuously involving the gluteus musculature and posterior compartment. Xray of lumbar spine showed only disc space narrowing. MRI of the lumbar spine showed multiple level degenerative disc disease with severe central canal stenosis at  L4-L5 with moderate bilateral neural foraminal stenosis and mod to severe bilateral neural foraminal stenosis at L5-S1.    -- cont pain control with prn Tylenol and prn oxycodone  -- cont fall precautions  -- will need TCU stay at discharge   -- initially kept off anticoagulation this stay dt falls, hematoma and anemia but given underlying hypercoagulable disorder will need to resume at some point -- hematoma healing and hgbs stable, presently lift dependent with plans to go to TCU at discharge (in a monitored setting thus theoretically decreasing his fall risk) -- Xarelto was resumed on 3/8 as below, currently being held for procedures    MSSA Bacteremia  Became febrile on 3/3 AM with Tmax 102.5. Denied cough, sob, rash or sx of UTI. UA showed mod leuk est and 23 WBCs. CXR with possible RLL infiltrate. Urine culture grew >100k enterococcus (adequately treated during stay). Blood cultures obtained and subsequently grew MSSA. Source of bacteremia unclear, possibly related to falls.   -- ID following, on Ancef 2g IV q8h  -- daily blood cultures to confirm clearing of bacteremia (blood cultures from 3/5 remain neg)  -- given his underlying valve dysfunction as below -- needs ESPERANZA to evaluate for endocarditis (TTE done earlier this stay, prior to dx of bacteremia) -- discussed with Dr. Salazar on 3/8, prefer ESPERANZA done before discharge so ordered on 3/8 (Friday afternoon) -- anticipate it won't be completed until 3/12     Acute on chronic anemia, due to hematoma/dilution, with macrocytosis  Baseline hemoglobin between 7.5-8.5.  Presented with a hemoglobin of 6.3 and was transfused 1U PRBC. Hgbs no remain stable at 8  -- Xarelto resumed 3/8, then held 3/9 - 3/11 given anticipate procedures (paracentesis, EGD, and ESPERANZA)  -- transfuse if hgb < 7  -- started on Vitamin B12 supplement     History of alcohol abuse  Alcoholic liver cirrhosis with ascites, portal hypertension  Hx of paracentesis, most recently in 12/2018.   Drinks  approx 1/3rd bottle of vodka a day at home. No withdrawal symptoms on this past hospital admission. Monitored on CIWA protocol on admission. No withdrawal sx observed so this was ultimately stopped.   -- patient complaining of increased abd distension and requesting paracentesis on 3/9, abd exam not overly impressive for ascites but patient insisted on having a paracentesis this stay -- as such, paracentesis was ordered on 3/9 -- given it was ordered on a Saturday and was non-emergent, unclear if it will be completed 3/10 or 3/11  -- EGD today to assess for varices  -- held Xarelto on 3/9 - 03/11  -- cont MVI, thiamine and folate     Antiphospholipid Antibody Syndrome (AAS), IVC filter in place, on chronic anticoagulation  History of PE and DVT:   As above, Xarelto and aspirin held this stay given presentation with hematoma and acute blood loss anemia  -- as above, hgbs stable and is now in a monitored setting with plans to transition to TCU, reducing risk of falls   -- Xarelto resumed on 3/8; held 3/9 and 3/10 given orders for paracentesis and ESPERANZA  -- have ordered Xarelto to resume on 3/11  -- aspirin remains on hold     Chronic Systolic CHF EF 40-45%.  Severe AS, mod MR, mod to severe TR, mild ascending aorta dilatation.  CAD with hx of 3V CABG  Thoracic aortic aneurysm without rupture  Hypertension  Hyperlipidemia  PTA meds: Coreg 3.125mg BID, torsemide 20mg daily, aspirin 81mg daily  Troponin on admission 0.030. EKG showed atrial fibrillation with slow ventricular response and nonspecific ST-T wave changes. Patient without chest pain. Echo obtained this stay for evaluation of heart function/valvular abnormalities showed no significant changes compared to previous -- EF 40-45%, ongoing severe aortic stenosis, MR and TR.  -- conts on Coreg with hold parameters  -- diuretic held on admission dt worsening renal function; renal function improved and was noted to have significant peripheral edema, resumed diuresis  with Bumex 1mg IV daily on 3/8  -- given ongoing LE and scrotal edema and stable renal function, increased Bumex to 1mg IV BID on 3/9 with good response  -- monitor Is/Os  -- cont lymphedema wraps -- discussed with PT and RN, given worsening scrotal edema (likely exacerbated by wraps), will remove RLE wrap and keep wrap on LLE stump -- hope to improve edema so that he will be able to resume wearing his prosthesis  -- consider scrotal sling to help with ongoing edema  -- resumed Xarelto on 3/8 as above but now on hold for procedures, aspirin remains on hold  -- aspirin on hold this stay  -- not on statin dt hx of intolerance    Paroxysmal atrial fibrillation on chronic anticoagulation:  In NSR this stay.   -- conts on Coreg  -- as above Xarelto was held on admission, resumed on 3/8 then held again from 3/9 - 3/10 as above    ANSELMO on stage III CKD  Baseline Cr 1.2 - 1.4. Cr 1.96 on admission. Presume prerenal dt blood loss from hematoma. Cr improved after initial IVFs. Diuretics remained on hold and renal function subsequently returned to baseline  -- resumed diuresis on 3/8, renal function remains stable  -- monitor daily BMP    History of for subdural hematoma status post craniotomy 7/2018  Patient had a mechanical fall in the setting of anticoagulation, developed a subdural hematoma and had craniotomy and evacuation. Anticoagulation was on hold for a few weeks and later resumed.    GERD:  Not on meds prior to admission  -- BID H2 blocker added this stay      Low grade B cell lymphoproliferative disorder  History of prostate cancer s/p radiative seed implants   No acute issues.      Peripheral polyneuropathy.  PTA Gabapentin 600mg BID reduced to 300mg BID given renal injury -- increased back to home dose on 3/8     Pressure injury coccyx  Skin tear on stump of left BKA  Stage I pressure injury to the sacrum  Wound care team consulted. Appreciate recommendations.     FEN: off IVFs, lytes stable, regular diet  DVT  Prophylaxis: PCDs  Code Status: Full Code    Disposition: Discharge date unclear, pending clearance of bacteremia and completion of ESPERANZA (likely won't be done until 3/12). Will need midline vs PICC prior to discharge. Patient has also requested a paracentesis prior to discharge. Will ultimately discharge to [place] [facility].     William Shaffer    Interval History     Patient seen and examined  No acute events overnight  No CP/SOB, no fevers or cough today  Unable to perform ESPERANZA due to need to rule out varices  No new complaints otherwise, but disappointed that ESPERANZA will need to be delayed     -Data reviewed today: I reviewed all new labs and imaging results over the last 24 hours. I personally reviewed no images or EKG's today.    Physical Exam   Temp: 98.1  F (36.7  C) Temp src: Oral BP: 107/62 Pulse: 55 Heart Rate: (!) 49 Resp: 14 SpO2: 97 % O2 Device: None (Room air)    Vitals:    03/07/19 0308 03/10/19 0643 03/11/19 0520   Weight: 107.2 kg (236 lb 5.3 oz) 108.6 kg (239 lb 6.7 oz) 108.3 kg (238 lb 12.1 oz)     Vital Signs with Ranges  Temp:  [98.1  F (36.7  C)-99.1  F (37.3  C)] 98.1  F (36.7  C)  Pulse:  [55] 55  Heart Rate:  [49-61] 49  Resp:  [14-16] 14  BP: (101-111)/(43-62) 107/62  SpO2:  [97 %-98 %] 97 %  I/O last 3 completed shifts:  In: -   Out: 1220 [Urine:1220]    Constitutional: Resting comfortably, alert and answering questions appropriately, NAD  Respiratory: CTAB, no wheeze/rales/rhonchi, no increased work of breathing  Cardiovascular: HR bradycardic with ++SM throughout precordium, ++bilateral LE edema extending up to the buttock and abdomen  GI: distended but S, NT, +BS  Skin/Integumen: warm/dry, healing ecchymosis noted on L hip and buttock   Other: Normal mood and affect    Medications     - MEDICATION INSTRUCTIONS -       - MEDICATION INSTRUCTIONS -       sodium chloride         benzocaine 20%  1-4 spray Mouth/Throat Once     bumetanide  1 mg Intravenous BID     carvedilol  3.125 mg Oral BID w/meals      ceFAZolin  2 g Intravenous Q8H     cyanocobalamin  1,000 mcg Sublingual Daily     fentaNYL  25-50 mcg Intravenous Once within 24 hrs     fluticasone  1 spray Both Nostrils Daily     folic acid  1 mg Oral Daily     gabapentin  600 mg Oral BID     midazolam  0.5-1 mg Intravenous Once within 24 hrs     multivitamin, therapeutic  1 tablet Oral Daily     ranitidine  150 mg Oral Daily     rivaroxaban ANTICOAGULANT  20 mg Oral Daily with supper     sodium chloride (PF)  10 mL Intracatheter Q8H     sodium chloride (PF)  3 mL Intravenous Q8H     sodium chloride (PF)  3 mL Intracatheter Q8H     vitamin B1  100 mg Oral Daily       Data   Recent Labs   Lab 03/11/19  0855 03/10/19  0850 03/09/19  0819  03/06/19  0940   WBC  --   --   --   --  5.0   HGB 8.2* 8.6* 8.4*   < > 7.6*   MCV  --   --   --   --  100   PLT  --   --   --   --  167   INR  --  1.83*  --   --   --     138 136   < > 135   POTASSIUM 3.4 3.8 4.4   < > 4.2   CHLORIDE 106 107 108   < > 109   CO2 27 24 22   < > 21   BUN 24 26 30   < > 39*   CR 1.22 1.14 1.22   < > 1.49*   ANIONGAP 6 7 6   < > 5   BENNIE 8.2* 8.4* 8.3*   < > 8.1*   GLC 80 67* 82   < > 127*    < > = values in this interval not displayed.       No results found for this or any previous visit (from the past 24 hour(s)).

## 2019-03-11 NOTE — ANESTHESIA POSTPROCEDURE EVALUATION
Patient: Antonio Ramirez    Procedure(s):  COMBINED ESOPHAGOSCOPY, GASTROSCOPY, DUODENOSCOPY (EGD), BIOPSY SINGLE OR MULTIPLE    Diagnosis:R/O Varices  Diagnosis Additional Information: No value filed.    Anesthesia Type:  MAC    Note:  Anesthesia Post Evaluation    Patient location during evaluation: PACU  Patient participation: Able to fully participate in evaluation  Level of consciousness: awake and alert  Pain management: adequate  Airway patency: patent  Cardiovascular status: acceptable  Respiratory status: acceptable  Hydration status: acceptable  PONV: none     Anesthetic complications: None          Last vitals:  Vitals:    03/11/19 1540 03/11/19 1614 03/11/19 1615   BP: 116/55  103/60   Pulse: 58  (!) 49   Resp:  16 16   Temp:   36.9  C (98.4  F)   SpO2: 96%           Electronically Signed By: Antonio Donovan MD  March 11, 2019  4:41 PM

## 2019-03-11 NOTE — PROGRESS NOTES
RADIOLOGY PROCEDURE NOTE  Patient name: Antonio Ramirez  MRN: [id number]  : 1945    Pre-procedure diagnosis: Ascites  Post-procedure diagnosis: Same    Procedure Date/Time: 2019  1:44 PM  Procedure: Paracentesis  Estimated blood loss: None  Specimen(s) collected with description: Yellow fluid  The patient tolerated the procedure well with no immediate complications.  Significant findings:none    See imaging dictation for procedural details.    Provider name: Ran Brody  Assistant(s):None

## 2019-03-12 ENCOUNTER — APPOINTMENT (OUTPATIENT)
Dept: PHYSICAL THERAPY | Facility: CLINIC | Age: 74
DRG: 605 | End: 2019-03-12
Payer: MEDICARE

## 2019-03-12 LAB
BACTERIA SPEC CULT: NO GROWTH
COPATH REPORT: NORMAL
Lab: NORMAL
SPECIMEN SOURCE: NORMAL

## 2019-03-12 PROCEDURE — 25000132 ZZH RX MED GY IP 250 OP 250 PS 637: Mod: GY | Performed by: INTERNAL MEDICINE

## 2019-03-12 PROCEDURE — 99222 1ST HOSP IP/OBS MODERATE 55: CPT | Performed by: PSYCHIATRY & NEUROLOGY

## 2019-03-12 PROCEDURE — 97140 MANUAL THERAPY 1/> REGIONS: CPT | Mod: GP

## 2019-03-12 PROCEDURE — A9270 NON-COVERED ITEM OR SERVICE: HCPCS | Mod: GY | Performed by: HOSPITALIST

## 2019-03-12 PROCEDURE — 97530 THERAPEUTIC ACTIVITIES: CPT | Mod: GP

## 2019-03-12 PROCEDURE — 25000132 ZZH RX MED GY IP 250 OP 250 PS 637: Mod: GY | Performed by: HOSPITALIST

## 2019-03-12 PROCEDURE — A9270 NON-COVERED ITEM OR SERVICE: HCPCS | Mod: GY | Performed by: INTERNAL MEDICINE

## 2019-03-12 PROCEDURE — 25000128 H RX IP 250 OP 636: Performed by: INTERNAL MEDICINE

## 2019-03-12 PROCEDURE — 25000132 ZZH RX MED GY IP 250 OP 250 PS 637: Mod: GY | Performed by: STUDENT IN AN ORGANIZED HEALTH CARE EDUCATION/TRAINING PROGRAM

## 2019-03-12 PROCEDURE — 40000239 ZZH STATISTIC VAT ROUNDS

## 2019-03-12 PROCEDURE — 99232 SBSQ HOSP IP/OBS MODERATE 35: CPT | Performed by: STUDENT IN AN ORGANIZED HEALTH CARE EDUCATION/TRAINING PROGRAM

## 2019-03-12 PROCEDURE — 12000000 ZZH R&B MED SURG/OB

## 2019-03-12 RX ADMIN — CEFAZOLIN SODIUM 2 G: 2 INJECTION, SOLUTION INTRAVENOUS at 10:02

## 2019-03-12 RX ADMIN — MICONAZOLE NITRATE: 2 POWDER TOPICAL at 06:15

## 2019-03-12 RX ADMIN — OXYCODONE HYDROCHLORIDE 5 MG: 5 TABLET ORAL at 06:20

## 2019-03-12 RX ADMIN — GABAPENTIN 600 MG: 300 CAPSULE ORAL at 09:55

## 2019-03-12 RX ADMIN — GABAPENTIN 600 MG: 300 CAPSULE ORAL at 20:42

## 2019-03-12 RX ADMIN — Medication 100 MG: at 09:55

## 2019-03-12 RX ADMIN — OXYCODONE HYDROCHLORIDE 5 MG: 5 TABLET ORAL at 22:23

## 2019-03-12 RX ADMIN — THERA TABS 1 TABLET: TAB at 09:55

## 2019-03-12 RX ADMIN — Medication 1000 MCG: at 09:55

## 2019-03-12 RX ADMIN — BUMETANIDE 1 MG: 0.25 INJECTION INTRAMUSCULAR; INTRAVENOUS at 09:54

## 2019-03-12 RX ADMIN — BUMETANIDE 1 MG: 0.25 INJECTION INTRAMUSCULAR; INTRAVENOUS at 17:57

## 2019-03-12 RX ADMIN — FOLIC ACID 1 MG: 1 TABLET ORAL at 09:54

## 2019-03-12 RX ADMIN — OXYCODONE HYDROCHLORIDE 5 MG: 5 TABLET ORAL at 11:34

## 2019-03-12 RX ADMIN — OXYCODONE HYDROCHLORIDE 5 MG: 5 TABLET ORAL at 18:02

## 2019-03-12 RX ADMIN — RIVAROXABAN 20 MG: 20 TABLET, FILM COATED ORAL at 17:57

## 2019-03-12 RX ADMIN — CARVEDILOL 3.12 MG: 3.12 TABLET, FILM COATED ORAL at 17:57

## 2019-03-12 RX ADMIN — OXYCODONE HYDROCHLORIDE 5 MG: 5 TABLET ORAL at 00:17

## 2019-03-12 RX ADMIN — RANITIDINE 150 MG: 150 TABLET ORAL at 09:55

## 2019-03-12 RX ADMIN — CEFAZOLIN SODIUM 2 G: 2 INJECTION, SOLUTION INTRAVENOUS at 19:22

## 2019-03-12 RX ADMIN — CEFAZOLIN SODIUM 2 G: 2 INJECTION, SOLUTION INTRAVENOUS at 02:37

## 2019-03-12 RX ADMIN — GABAPENTIN 600 MG: 300 CAPSULE ORAL at 00:17

## 2019-03-12 ASSESSMENT — ACTIVITIES OF DAILY LIVING (ADL)
ADLS_ACUITY_SCORE: 32
ADLS_ACUITY_SCORE: 27
ADLS_ACUITY_SCORE: 32
ADLS_ACUITY_SCORE: 26
ADLS_ACUITY_SCORE: 26
ADLS_ACUITY_SCORE: 32

## 2019-03-12 NOTE — PROGRESS NOTES
Pt refused repositioning and wound care to back this shift. Refused cares as well. Continue to encourage repositioning. Patient does move well in bed and able to turn on own.  Charge nurse aware and updated.

## 2019-03-12 NOTE — PLAN OF CARE
Hypertension Comorbidity  Blood Pressure in Desired Range  3/12/2019 1419 - Improving by Florence Maravilla, RN  3/12/2019 0702 - No Change by David Bush, RN     Adult Inpatient Plan of Care  Plan of Care Review  3/12/2019 1419 - Improving by Florence Maravilla RN  3/12/2019 0702 - No Change by David Bush, RN   IV antibiotics being given. Tap done yesterday-plan for ESPERANZA tomorrow due to today's schedule being full. PRN oxycodone x 1. Refused back wound care to be done. Continues to refuse repositioning after multiple requests. Vitals stable, alert x 4.

## 2019-03-12 NOTE — PROGRESS NOTES
Echo department is unable to accommodate the ESPERANZA today.  Will put on the schedule for tomorrow morning at 0930.

## 2019-03-12 NOTE — PLAN OF CARE
Discharge Planner PT   Patient plan for discharge: rehab  Current status: RN had removed lymph wraps and wound cares completed. Pt tolerated lymph wraps well and L residual limb size continues to reduce. Pt required SBA for supine to sit. LLE prosthesis donned and did lock in place today. CGA for sit to stand with B side rails of the bed and L prosthesis donned. Pt stood x3 for approx 1:30 mins each trial. Required min A for sit to supine for LEs. PT completed skin cares and re-applied L LE residual limb lymph wraps and velcro thigh wrap. Pt declined towel roll for scrotal edema control. Nursing, PT will remove the wraps on L LE at 9:30 on 3/13 and complete mobility training. RN will do wound cares for L LE wound, skin cares and reapply wraps at 10:30 on 3/13.    Barriers to return to prior living situation: level of assist required, not yet ambulating, weakness, fall risk  Recommendations for discharge: TCU  Rationale for recommendations: Pt would benefit from PT at TCU to progress strength, balance and mobility so pt can return home safely.        Entered by: Zari De Leon 03/12/2019 11:02 AM

## 2019-03-12 NOTE — CONSULTS
"[facility] Initial Psychiatric Consult Note      TIME SPENT IN PSYCHIATRY INITIAL CONSULT: 55 MINUTES    Consult ordered by: Katherin Boyd MD  Reason: Alcohol dependence in setting of alcoholic cirrhosis     Initial History   The patient's care was discussed, patient seen and chart notes were reviewed.    Patient examined for psychiatric consultation.     IDENTIFICATION  Pt is a 73 year old  male who lives in [place]. Pt sees PCP Main Weiner. Pt seen on 3/12/19 for alcohol dependency.    HISTORY OF PRESENT ILLNESS    Patient is a 73 y.o. Male who presented on 2/28/19 after a fall on left buttock, resulting in traumatic hematoma. History is limited due to patient being reluctant to give information, as he is not interested in a psychiatric consult and not interested in stopping alcohol use or getting treatment. Patient reports 4-5 drinks of vodka per day and states he has been drinking since his 20s. He currently denies any current symptoms of alcohol withdrawal and denies ever having alcohol withdrawal. He reports his longest period of sobriety was 6 months, 40 years ago after he attended a treatment program for his alcohol use. He states he \"felt great\" during this time. When discussing patient's medical history, patient stated he is \"not sure how alcohol had anything to do with it\". Patient denies any history of psychiatric conditions. When asking about symptoms of depression, patient stated he had low energy and low motivation. When asked about sad mood, patient stated \"I'm a pretty happy yulisa\" and when asked what he does during the day, patient stated \"nothing\". Patient denies having depression or suicidal ideation.    CHEMICAL DEPENDENCY HISTORY    Patient reports current alcohol use of 4-5 drinks of vodka per day. Patient states he began drinking in his 20s and went to treatment for alcohol use 40 years ago. He cannot recall what the treatment program was called but states he attended " "several group meetings as a part of this treatment. He denies ever attending AA. He reports his longest period of sobriety is 6 months, which was 40 years ago following treatment. He states he \"felt great\" during that time but expressed he has no desire to quit alcohol at this time. He denies ever going through alcohol withdrawal. Patient reports history of using cocaine in 1981, denies any current use. Denies history of using any other drugs.     PAST PSYCHIATRIC HISTORY  Patient denies any past psychiatric history and reports he has never been diagnosed with any psychiatric conditions. He denies any previous psychiatric hospitalizations. He denies any previous psychiatric medications.     FAMILY HISTORY  Patient denies any family history of psychiatric conditions.     SOCIAL HISTORY  Patient grew up in [place]. 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 Patient has been  for over 50 years and has children. He currently lives in an apartment in [place] with his wife.      Medications     Medications Prior to Admission   Medication Sig Dispense Refill Last Dose     Camphor-Menthol (MENTHOLATUM EX) Externally apply topically nightly as needed   prn     [DISCONTINUED] aspirin 81 MG tablet Take 81 mg by mouth daily   2/27/2019 at am     [DISCONTINUED] XARELTO 20 MG TABS tablet TAKE 1 TABLET(20 MG) BY MOUTH DAILY 90 tablet 3 2/28/2019 at am       Scheduled Medications    albumin human  12.5 g Intravenous Once     benzocaine 20%  1-4 spray Mouth/Throat Once     bumetanide  1 mg Intravenous BID     carvedilol  3.125 mg Oral BID w/meals     ceFAZolin  2 g Intravenous Q8H     cyanocobalamin  1,000 mcg Sublingual Daily     fluticasone  1 spray Both Nostrils Daily     folic acid  1 mg Oral Daily     gabapentin  600 mg Oral BID     multivitamin, therapeutic  1 tablet Oral Daily     ranitidine  150 mg Oral Daily     " "rivaroxaban ANTICOAGULANT  20 mg Oral Daily with supper     sodium chloride (PF)  10 mL Intracatheter Q8H     sodium chloride (PF)  3 mL Intracatheter Q8H     vitamin B1  100 mg Oral Daily     PRNs:  acetaminophen, bisacodyl, glucose **OR** dextrose **OR** glucagon, flumazenil, - MEDICATION INSTRUCTIONS -, HOLD MEDICATION, HOLD MEDICATION, HOLD MEDICATION, ipratropium, lidocaine 4%, lidocaine (buffered or not buffered), lidocaine (buffered or not buffered), - MEDICATION INSTRUCTIONS -, - MEDICATION INSTRUCTIONS -, melatonin, miconazole, naloxone, naloxone, naloxone, ondansetron **OR** ondansetron, oxyCODONE, polyethylene glycol, senna-docusate **OR** senna-docusate, sodium chloride (PF), sodium chloride (PF), sodium chloride      Allergies        Allergies   Allergen Reactions     Ciprofloxacin Itching     Severe itching \"like ants were crawling\"     Hmg-Coa-R Inhibitors Other (See Comments)     Rhabdomyolysis occurred within a couple days        Previous Medical History     Past Medical History:   Diagnosis Date     Alcoholism (H)      Amputated left leg (H)      Anemia      Anticardiolipin antibody syndrome (H)      Antiphospholipid antibody syndrome (H)      Antiplatelet or antithrombotic long-term use      Aortic root dilatation (H)      Aortic stenosis      Arthritis      Balance problem      Coronary artery disease      Gastro-oesophageal reflux disease      Heart attack (H) 2007     Hiatal hernia      Hypercholesterolemia      Hypertension      Ischemic cardiomyopathy      Left foot drop      Liver disease      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)      Moderate mitral regurgitation      Monoclonal gammopathy      Neuropathy      Noninfectious ileitis      Nonrheumatic tricuspid valve regurgitation      Paroxysmal atrial fibrillation (H)      PE (pulmonary embolism) 2006     Prostate cancer (H) 2000     Pulmonary hypertension (H)      Renal disease      Syncope      Thrombocytopenia (H)      " "Urethral stricture      Venous thrombosis         Medical Review of Systems     /58 (BP Location: Left arm)   Pulse 51   Temp 98.7  F (37.1  C) (Oral)   Resp 16   Ht 1.854 m (6' 1\")   Wt 108.3 kg (238 lb 12.1 oz)   SpO2 97%   BMI 31.50 kg/m    Body mass index is 31.5 kg/m .    Previous 10-point ROS completed by Davie Phillips MD on 2/28/19 reviewed by Richard Singer MD on March 12, 2019 and is unchanged except for those problems mentioned within the HPI.      Mental Status Examination     Appearance Lying in bed, dressed in gown. Appears stated age.   Attitude Refused psychiatric care    Orientation Oriented to person, place, time   Eye Contact Fair   Speech Regular rate, rhythm, volume and tone   Language Normal   Psychomotor Behavior Normal   Thought Process Not Goal-Oriented    Associations Intact   Thought Content Patient is currently negative for suicidal ideation, negative for plan or intent, able to contract no self harm and identify barriers to suicide.  Negative for obsessions, compulsions or psychosis.     Mood Fine    Affect Depressed and irritable    Fund of Knowledge Appropriate   Insight Poor   Judgement Poor   Attention Span & Concentration Intact   Recent & Remote Memory Intact   Gait Not tested    Muscle Tone Intact      Labs     Labs reviewed.  Recent Results (from the past 24 hour(s))   UPPER GI ENDOSCOPY    Collection Time: 03/11/19  2:43 PM   Result Value Ref Range    Upper GI Endoscopy       [facility] Endoscopy Department  _______________________________________________________________________________  Patient Name: Antonio Ramirez           Procedure Date: 3/11/2019 2:43 PM  MRN: [id number]                       Account Number: [id number]  YOB: 1945              Admit Type: Inpatient  Age: 73                               Room: special procedure room #1  Note Status: Finalized                Attending MD: Katherin Boyd , " MD  Total Sedation Time:                  Instrument Name: 314 GIF- Gastroscope  _______________________________________________________________________________     Procedure:                Upper GI endoscopy  Indications:              Cirrhosis rule out esophageal varices  Providers:                Katherin Boyd MD, Randee Arias RN  Referring MD:               Medicines:                Monitored Anesthesia Care  Complications:            No immediate complications.  ____________________________________ ___________________________________________  Procedure:                Pre-Anesthesia Assessment:                            - Prior to the procedure, a History and Physical                             was performed, and patient medications and                             allergies were reviewed. The patient is competent.                             The risks and benefits of the procedure and the                             sedation options and risks were discussed with the                             patient. All questions were answered and informed                             consent was obtained. Patient identification and                             proposed procedure were verified by the physician                             in the procedure room. Mental Status Examination:                             alert and oriented. Airway Examination: normal                             oropharyngeal airway and neck mobility. Respiratory                             Examination: clear to auscu ltation. CV Examination:                             normal. Prophylactic Antibiotics: The patient does                             not require prophylactic antibiotics. Prior                             Anticoagulants: The patient has taken no previous                             anticoagulant or antiplatelet agents. ASA Grade                             Assessment: II - A patient with mild systemic                              disease. After reviewing the risks and benefits,                             the patient was deemed in satisfactory condition to                             undergo the procedure. The anesthesia plan was to                             use monitored anesthesia care (MAC). Immediately                             prior to administration of medications, the patient                             was re-assessed for adequacy to receive sedatives.                             The heart rate, respiratory rate, oxygen                             saturations, blood pressure,  adequacy of pulmonary                             ventilation, and response to care were monitored                             throughout the procedure. The physical status of                             the patient was re-assessed after the procedure.                            After obtaining informed consent, the endoscope was                             passed under direct vision. Throughout the                             procedure, the patient's blood pressure, pulse, and                             oxygen saturations were monitored continuously. The                             Endoscope was introduced through the mouth, and                             advanced to the second part of duodenum. The upper                             GI endoscopy was accomplished with ease. The                             patient tolerated the procedure well.                                                                                   Findings:       The examined esophagus was normal. GE juncti on 40 cm. No varices.       Moderate portal hypertensive gastropathy was found in the gastric fundus        and in the gastric body.       Localized granular mucosa was found in the gastric antrum. Biopsies were        taken with a cold forceps for histology.       The examined duodenum was normal.                                                                                    Impression:               - Normal esophagus.                            - Portal hypertensive gastropathy.                            - Granular gastric mucosa. Biopsied.                            - Normal examined duodenum.  Recommendation:           - [facility] to [facility] for [facility] care.                            - Resume regular diet today.                            - Await pathology results.                            - Repeat upper endoscopy in 2 years for screening                             purposes.                                                                                     CARA pritchett MD  _____________________  Katherin Boyd MD  3/11/2019 3:17:15 PM  I was physically present for the entire viewing portion of the exam.  Katherin Boyd MD  Number of Addenda: 0    Note Initiated On: 3/11/2019 2:43 PM  MRN:                      [id number]  Procedure Date:           3/11/2019 2:43:54 PM  Total Procedure Duration: 0 hours 4 minutes 36 seconds   Estimated Blood Loss:       Estimated blood loss: none.  Scope In: 3:01:11 PM  Scope Out: 3:05:47 PM          Impression   This is a 73 year old male with history of alcoholic cirrhosis of the liver and alcohol abuse and was presented on 2/28/19 to [facility] after a fall on the buttock with subsequent bleeding into the area. Psychiatry was consulted to address patients alcohol dependence in the setting of alcoholic cirrhosis. On interview, patient stated he is not interested in quitting alcohol at this time and has no desire to seek additional care in regards of alcohol abuse. Discussed with patient the negative effects of alcohol, particularly in patients current situation. Patient is quite defensive in regards of his alcohol abuse. He does endorse low energy and low motivation, but refuses to acknowledge possible underlying mental health conditions. He is currently refusing psychiatric care, thus no medications will be started at  this time.     Diagnoses     1. Alcohol use disorder  2. Alcoholic cirrhosis of liver     Plan     1. Patient declining psychiatric care and treatment for substance abuse at this time  2. Medication Changes: None  3. Discussed treatment plan with patient and team.  4. Re-consult Psychiatry as needed.      TIME SPENT IN PSYCHIATRY INITIAL CONSULT: 55 MINUTES     Attestation:   Patient has been seen and evaluated by me, Richard Singer MD.    Patient ID:  Name: Antonio Ramirez MRN: [id number]  Admission: 2/28/2019 YOB: 1945

## 2019-03-12 NOTE — PROGRESS NOTES
FAHEEM NAVA) Patient may be ready for discharge to TCU on 3/13. He was previously accepted at [facility] on [address].    I) Reached Marie in Admissions at [facility]. She would have a shared room for patient on Wednesday and a private room on Thursday.      P) Following.

## 2019-03-12 NOTE — PROGRESS NOTES
[facility]  Infectious Disease Progress Note          Assessment and Plan:     IMPRESSION:   1.  A 73-year-old male with vague possibly several week illness of malaise, fatigue, anemia, multiple falls including ecchymosis and injuries.  No significant fever or perceived infection, then fever spike in the hospital.  Blood cultures 2/2 rapidly positive for Staph aureus.  Conceivably hospital-acquired infection, ecchymosis/injury related infection, but much higher probability this is the actual underlying main primary diagnosis, i.e., Staph aureus bacteremia syndrome explaining his clinical illness of several weeks.   2.  Methicillin-sensitive Staph aureus bacteremia, high grade, possible endocarditis given major valve disease, no clear secondary sites, but multiple pain sites including back pain where imaging was negative.   3.  Positive UA and UC for enterococcus, significance not clear, no actual focal symptoms or anatomic abnormalities.  Unlikely to be relevant.   4.  Acute on chronic renal insufficiency.   5.  Acute on chronic anemia, very likely related to the current acute illness.   6.  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   7.  Multiple valve abnormalities, aortic stenosis, mitral regurgitation and tricuspid regurgitation, loud impressive murmur.   8.  Atrial fibrillation on chronic anticoagulation.   9.  Coronary artery disease.   10.  CIPRO ALLERGY.   11.  Chronic alcohol abuse.   12.  Prior subdural hematoma.   13.  Multiple joints in place with replacements that do not look infected.   14 apparent chronic low grade lymphoma, status unclear  15 Prior PE and antiphospholipid syndrome     RECOMMENDATIONS:   1.  Ancef  q8 to complete 4 weeks  2.  Blood cultures  Neg in Follow-up ,  midline  3.  Given the multiple valve abnormalities and high-grade bacteremia, ESPERANZA today after neg EGD if no major issue Ok disposition, orders in            Interval  "History:   Improved cognition and overall no new pain site T down, creat better Follow-up BC neg FU              Medications:       bumetanide  1 mg Intravenous BID     carvedilol  3.125 mg Oral BID w/meals     ceFAZolin  2 g Intravenous Q8H     cyanocobalamin  1,000 mcg Sublingual Daily     fluticasone  1 spray Both Nostrils Daily     folic acid  1 mg Oral Daily     gabapentin  600 mg Oral BID     multivitamin, therapeutic  1 tablet Oral Daily     ranitidine  150 mg Oral Daily     rivaroxaban ANTICOAGULANT  20 mg Oral Daily with supper     sodium chloride (PF)  10 mL Intracatheter Q8H     sodium chloride (PF)  3 mL Intracatheter Q8H     vitamin B1  100 mg Oral Daily                  Physical Exam:   Blood pressure 103/58, pulse 51, temperature 98.7  F (37.1  C), temperature source Oral, resp. rate 16, height 1.854 m (6' 1\"), weight 108.3 kg (238 lb 12.1 oz), SpO2 97 %.  Wt Readings from Last 2 Encounters:   03/11/19 108.3 kg (238 lb 12.1 oz)   02/20/19 90.7 kg (200 lb)     Vital Signs with Ranges  Temp:  [98.4  F (36.9  C)-98.7  F (37.1  C)] 98.7  F (37.1  C)  Pulse:  [50-58] 51  Heart Rate:  [49-67] 49  Resp:  [16-18] 16  BP: (100-121)/(47-60) 103/58  SpO2:  [96 %-100 %] 97 %    Constitutional: improved cooperative, no apparent distress chronic ill appearance   Lungs: Clear to auscultation bilaterally, no crackles or wheezing   Cardiovascular: Regular rate and rhythm, normal S1 and S2, and loud  murmur noted   Abdomen: Normal bowel sounds, soft, non-distended, non-tender   Skin: No rashes, no cyanosis,sig edema ecchymosis, no clear involved joints   Other:           Data:   All microbiology laboratory data reviewed.  Recent Labs   Lab Test 03/11/19  0855 03/10/19  0850 03/09/19  0819  03/06/19  0940 03/03/19  0916 03/02/19  0720   WBC  --   --   --   --  5.0 8.2 10.3   HGB 8.2* 8.6* 8.4*   < > 7.6* 7.5* 7.4*   HCT  --   --   --   --  23.8* 23.3* 24.0*   MCV  --   --   --   --  100 100 102*   PLT  --   --   --   -- "  167 212 289    < > = values in this interval not displayed.     Recent Labs   Lab Test 03/11/19  0855 03/10/19  0850 03/09/19  0819   CR 1.22 1.14 1.22     No lab results found.  Recent Labs   Lab Test 03/08/19  0827 03/07/19  0906 03/06/19  0940 03/05/19  1755 03/05/19  1753 03/03/19  1320 03/03/19  0922 03/03/19  0915 12/07/18  1321   CULT No growth after 4 days No growth after 5 days No growth No growth No growth >100,000 colonies/mL  Enterococcus faecalis  *  <10,000 colonies/mL  urogenital aranza  Susceptibility testing not routinely done   Cultured on the 1st day of incubation:  Staphylococcus aureus  *  Critical Value/Significant Value, preliminary result only, called to and read back by  Jg Reddy RN, @[id number] 03/03/19.DH.    Susceptibility testing done on previous specimen Cultured on the 1st day of incubation:  Staphylococcus aureus  *  Critical Value/Significant Value, preliminary result only, called to and read back by  Jonas Reddy RN, @[id number] 03/03/19..    (Note)  POSITIVE for STAPHYLOCOCCUS AUREUS and NEGATIVE for the mecA gene  (not MRSA) by Songtradrigene multiplex nucleic acid test. The mecA gene was  not detected. Final identification and antimicrobial susceptibility  testing will be verified by standard methods.    Specimen tested with Verigene multiplex, gram-positive blood culture  nucleic acid test for the following targets: Staph aureus, Staph  epidermidis, Staph lugdunensis, other Staph species, Enterococcus  faecalis, Enterococcus faecium, Streptococcus species, S. agalactiae,  S. anginosus grp., S. pneumoniae, S. pyogenes, Listeria sp., mecA  (methicillin resistance) and Rad/B (vancomycin resistance).    Critical Value/Significant Value called to and read back by Alma Mayfield RN  3.4.19 GEOVANY.   >100,000 colonies/mL  Enterococcus faecalis  *

## 2019-03-12 NOTE — PROGRESS NOTES
[facility]    Hospitalist Progress Note    Assessment & Plan   Antonio Ramirez is a 73 year old male with PMHx of alcoholic liver cirrhosis, CKD, hypertension, CAD with 3V CABG in 2007, afib, HFpEF, hx of DVT and anticardiolipin Ab, hx of L BKA, hx of UTIs, and hx of traumatic SDH in 4/2018 who was admitted on 2/28/2019 after a fall with resulting buttock pain and LLE hematoma in the setting of anticoagulation with supratherapuetic INR.     Hospital course has been complicated by the development of MSSA bacteremia.    Left leg hematoma in the setting of anticoagulation  Fall at home, presumed mechanical in the setting of alcohol abuse  Supratherapeutic INR in the setting of anticoagulation/alcohol use, status post reversal  Physical deconditioning due to acute illness/chronic debility  Low Back Pain: Improved  Patient brought to the ED via EMS for buttock pain after a fall which had occurred 10d prior. This represented his 3rd visit in recent weeks for complaints of weakness (seen in ED on 2/17 and 2/20). Endorsed he had been mostly bed-bound during the prior 10d and that he was now interested in going to TCU. Had been drinking 4-5 drinks of Vodka daily. Exam was notable for bruising on the buttock and left thigh and LLE edema, consistent with hematoma. General surgery consulted. At time of presentation, INR was 7.45 and he was given vitamin K for reversal. Nonsurgical management recommended. Serial hgbs remained stable. Evaluated by wound care for skin tears on hematoma.     During the course of his stay, he, underwent additional workup and imaging -- US of LLE showed no evidence of DVT. CT left hip showed multiple intramuscular hematomas throughout the LLE most conspicuously involving the gluteus musculature and posterior compartment. Xray of lumbar spine showed only disc space narrowing. MRI of the lumbar spine showed multiple level degenerative disc disease with severe central canal stenosis at  L4-L5 with moderate bilateral neural foraminal stenosis and mod to severe bilateral neural foraminal stenosis at L5-S1.  Plan:  -- cont pain control with prn Tylenol and prn oxycodone  -- cont fall precautions  -- will need TCU stay at discharge   -- initially kept off anticoagulation this stay dt falls, hematoma and anemia but given underlying hypercoagulable disorder will need to resume at some point -- hematoma healing and hgbs stable, presently lift dependent with plans to go to TCU at discharge (in a monitored setting thus theoretically decreasing his fall risk) -- Xarelto was resumed on 3/8 as below, was being held for paracentesis, resumed 03    MSSA Bacteremia  Became febrile on 3/3 AM with Tmax 102.5. Denied cough, sob, rash or sx of UTI. UA showed mod leuk est and 23 WBCs. CXR with possible RLL infiltrate. Urine culture grew >100k enterococcus (adequately treated during stay). Blood cultures obtained and subsequently grew MSSA. Source of bacteremia unclear, possibly related to falls.   -- ID following, on Ancef 2g IV q8h  -- daily blood cultures to confirm clearing of bacteremia (blood cultures from 3/5 remain neg)  -- given his underlying valve dysfunction as below -- needs ESPERANZA to evaluate for endocarditis (TTE done earlier this stay, prior to dx of bacteremia) -- discussed with Dr. Salazar on 3/8, prefer ESPERANZA done before discharge so ordered on 3/8 (Friday afternoon) -- anticipate it won't be completed until 3/12     Acute on chronic anemia, due to hematoma/dilution, with macrocytosis  Baseline hemoglobin between 7.5-8.5.  Presented with a hemoglobin of 6.3 and was transfused 1U PRBC. Hgbs no remain stable at 8  -- Xarelto resumed 3/8, then held 3/9 - 3/11 given anticipate procedures (paracentesis, EGD, and ESPERANZA)  -- transfuse if hgb < 7  -- started on Vitamin B12 supplement     History of alcohol abuse  Alcoholic liver cirrhosis with ascites, portal hypertension  Hx of paracentesis, most recently in  12/2018.   Drinks approx 1/3rd bottle of vodka a day at home. No withdrawal symptoms on this past hospital admission. Monitored on CIWA protocol on admission. No withdrawal sx observed so this was ultimately stopped.   -- patient complaining of increased abd distension and requesting paracentesis on 3/9, abd exam not overly impressive for ascites but patient insisted on having a paracentesis this stay -- as such, paracentesis was ordered on 3/9 -- given it was ordered on a Saturday and was non-emergent, unclear if it will be completed 3/10 or 3/11  -- EGD showed no varices  -- held Xarelto on 3/9 - 03/10  -- cont MVI, thiamine and folate     Antiphospholipid Antibody Syndrome (AAS), IVC filter in place, on chronic anticoagulation  History of PE and DVT:   As above, Xarelto and aspirin held this stay given presentation with hematoma and acute blood loss anemia  -- as above, hgbs stable and is now in a monitored setting with plans to transition to TCU, reducing risk of falls   -- Xarelto resumed on 3/8; held 3/9 and 3/10 given orders for paracentesis and ESPERANZA  -- have ordered Xarelto to resume on 3/11, does not need to be held prior to ESPERANZA  -- aspirin remains on hold     Chronic Systolic CHF EF 40-45%.  Severe AS, mod MR, mod to severe TR, mild ascending aorta dilatation.  CAD with hx of 3V CABG  Thoracic aortic aneurysm without rupture  Hypertension  Hyperlipidemia  PTA meds: Coreg 3.125mg BID, torsemide 20mg daily, aspirin 81mg daily  Troponin on admission 0.030. EKG showed atrial fibrillation with slow ventricular response and nonspecific ST-T wave changes. Patient without chest pain. Echo obtained this stay for evaluation of heart function/valvular abnormalities showed no significant changes compared to previous -- EF 40-45%, ongoing severe aortic stenosis, MR and TR.  -- conts on Coreg with hold parameters  -- diuretic held on admission dt worsening renal function; renal function improved and was noted to have  significant peripheral edema, resumed diuresis with Bumex 1mg IV daily on 3/8  -- given ongoing LE and scrotal edema and stable renal function, increased Bumex to 1mg IV BID on 3/9 with good response  -- monitor Is/Os  -- cont lymphedema wraps -- discussed with PT and RN, given worsening scrotal edema (likely exacerbated by wraps), will remove RLE wrap and keep wrap on LLE stump -- hope to improve edema so that he will be able to resume wearing his prosthesis  -- consider scrotal sling to help with ongoing edema  -- resumed Xarelto on 3/8 as above but now on hold for procedures, aspirin remains on hold  -- aspirin on hold this stay  -- not on statin dt hx of intolerance    Paroxysmal atrial fibrillation on chronic anticoagulation:  In NSR this stay.   -- conts on Coreg  -- as above Xarelto was held on admission, resumed on 3/8 then held again from 3/9 - 3/10 as above    ANSELMO on stage III CKD  Baseline Cr 1.2 - 1.4. Cr 1.96 on admission. Presume prerenal dt blood loss from hematoma. Cr improved after initial IVFs. Diuretics remained on hold and renal function subsequently returned to baseline  -- resumed diuresis on 3/8, renal function remains stable  -- monitor daily BMP    History of for subdural hematoma status post craniotomy 7/2018  Patient had a mechanical fall in the setting of anticoagulation, developed a subdural hematoma and had craniotomy and evacuation. Anticoagulation was on hold for a few weeks and later resumed.    GERD:  Not on meds prior to admission  -- BID H2 blocker added this stay      Low grade B cell lymphoproliferative disorder  History of prostate cancer s/p radiative seed implants   No acute issues.      Peripheral polyneuropathy.  PTA Gabapentin 600mg BID reduced to 300mg BID given renal injury -- increased back to home dose on 3/8     Pressure injury coccyx  Skin tear on stump of left BKA  Stage I pressure injury to the sacrum  Wound care team consulted. Appreciate recommendations.     FEN:  off IVFs, lytes stable, regular diet  DVT Prophylaxis: PCDs  Code Status: Full Code    Disposition: Discharge date unclear, pending clearance of bacteremia and completion of ESPERANZA (likely won't be done until 3/13). Will ultimately discharge to [facility].     William Shaffer    Interval History     Patient seen and examined  No acute events overnight  Again was very disappointed that ESPERANZA will again need to be delayed   No new fever, no CP/SOB. No nausea/vomiting. No new complaints today    -Data reviewed today: I reviewed all new labs and imaging results over the last 24 hours. I personally reviewed no images or EKG's today.    Physical Exam   Temp: 98.8  F (37.1  C) Temp src: Oral BP: 115/57   Heart Rate: 62 Resp: 16 SpO2: 93 % O2 Device: None (Room air)    Vitals:    03/07/19 0308 03/10/19 0643 03/11/19 0520   Weight: 107.2 kg (236 lb 5.3 oz) 108.6 kg (239 lb 6.7 oz) 108.3 kg (238 lb 12.1 oz)     Vital Signs with Ranges  Temp:  [98.6  F (37  C)-98.8  F (37.1  C)] 98.8  F (37.1  C)  Heart Rate:  [49-67] 62  Resp:  [14-16] 16  BP: (101-115)/(56-58) 115/57  SpO2:  [93 %-97 %] 93 %  I/O last 3 completed shifts:  In: 960 [P.O.:760; I.V.:200]  Out: 1650 [Urine:1650]    Constitutional: Resting comfortably, alert and answering questions appropriately, NAD  Respiratory: CTAB, no wheeze/rales/rhonchi, no increased work of breathing  Cardiovascular: HR bradycardic with ++SM throughout precordium, ++bilateral LE edema extending up to the buttock and abdomen  GI: distended but S, NT, +BS  Skin/Integumen: warm/dry, healing ecchymosis noted on L hip and buttock   Other: Normal mood and affect    Medications     sodium chloride         bumetanide  1 mg Intravenous BID     carvedilol  3.125 mg Oral BID w/meals     ceFAZolin  2 g Intravenous Q8H     cyanocobalamin  1,000 mcg Sublingual Daily     fluticasone  1 spray Both Nostrils Daily     folic acid  1 mg Oral Daily     gabapentin  600 mg Oral BID     multivitamin, therapeutic  1 tablet  Oral Daily     ranitidine  150 mg Oral Daily     rivaroxaban ANTICOAGULANT  20 mg Oral Daily with supper     sodium chloride (PF)  10 mL Intracatheter Q8H     sodium chloride (PF)  3 mL Intracatheter Q8H     vitamin B1  100 mg Oral Daily       Data   Recent Labs   Lab 03/11/19  0855 03/10/19  0850 03/09/19  0819  03/06/19  0940   WBC  --   --   --   --  5.0   HGB 8.2* 8.6* 8.4*   < > 7.6*   MCV  --   --   --   --  100   PLT  --   --   --   --  167   INR  --  1.83*  --   --   --     138 136   < > 135   POTASSIUM 3.4 3.8 4.4   < > 4.2   CHLORIDE 106 107 108   < > 109   CO2 27 24 22   < > 21   BUN 24 26 30   < > 39*   CR 1.22 1.14 1.22   < > 1.49*   ANIONGAP 6 7 6   < > 5   BENNIE 8.2* 8.4* 8.3*   < > 8.1*   GLC 80 67* 82   < > 127*    < > = values in this interval not displayed.       No results found for this or any previous visit (from the past 24 hour(s)).

## 2019-03-12 NOTE — PLAN OF CARE
[id number]: A&Ox4.  Total, lift, turn/repo.  Intermittently declines repo.  Dribbles urine at times, but generally continent.  NPO overnight for ESPERANZA today.  VSS on RA.  PRN Oxycodone x2 for LLE phantom limb pain effective.  Midline RUE with good blood return, no labs this morning.  Saline locked.  Received IV Ancef.  LS diminished.  Audible heart murmur.  Significant edema, hips to feet, +3 and +4 in scrotum.  Miconazole applied to pink/red groin.  Weak pedal pulse on the right.  Baseline numbness/tingling to BLE's.  LLE BKA, lymph wrap in place to be removed at 0930 along with wound care, then therapy will reapply lymph wrap at 1030.  Heels elevated In bed.  Mepilex to spine.  Discharge to TCU pending.

## 2019-03-12 NOTE — PLAN OF CARE
Fall Injury Risk  Absence of Fall and Fall-Related Injury  Description  improving   3/11/2019 1940 - No Change by Florence Maravilla, RN     Adult Inpatient Plan of Care  Plan of Care Review  3/11/2019 1940 - Improving by Florence Maravilla, RN  3/11/2019 0631 - Improving by Pablo Khan RN   IV antibiotics being given-phantom leg pain-oxycodone helpful. Tap and EGD done-cleared for ESPERANZA per GI. Refusing to reposition at times, but moves well in the bed. Alert x4 vitals stable.

## 2019-03-13 ENCOUNTER — APPOINTMENT (OUTPATIENT)
Dept: CARDIOLOGY | Facility: CLINIC | Age: 74
DRG: 605 | End: 2019-03-13
Attending: INTERNAL MEDICINE
Payer: MEDICARE

## 2019-03-13 LAB
BACTERIA SPEC CULT: NO GROWTH
Lab: NORMAL
SPECIMEN SOURCE: NORMAL

## 2019-03-13 PROCEDURE — 25000128 H RX IP 250 OP 636: Performed by: INTERNAL MEDICINE

## 2019-03-13 PROCEDURE — 93320 DOPPLER ECHO COMPLETE: CPT | Mod: 26 | Performed by: INTERNAL MEDICINE

## 2019-03-13 PROCEDURE — A9270 NON-COVERED ITEM OR SERVICE: HCPCS | Mod: GY | Performed by: INTERNAL MEDICINE

## 2019-03-13 PROCEDURE — 12000000 ZZH R&B MED SURG/OB

## 2019-03-13 PROCEDURE — 40000857 ZZH STATISTIC TEE INCLUDES SEDATION

## 2019-03-13 PROCEDURE — 76376 3D RENDER W/INTRP POSTPROCES: CPT

## 2019-03-13 PROCEDURE — 40000235 ZZH STATISTIC TELEMETRY

## 2019-03-13 PROCEDURE — 25000132 ZZH RX MED GY IP 250 OP 250 PS 637: Mod: GY | Performed by: HOSPITALIST

## 2019-03-13 PROCEDURE — 25000132 ZZH RX MED GY IP 250 OP 250 PS 637: Mod: GY | Performed by: INTERNAL MEDICINE

## 2019-03-13 PROCEDURE — A9270 NON-COVERED ITEM OR SERVICE: HCPCS | Performed by: INTERNAL MEDICINE

## 2019-03-13 PROCEDURE — 93312 ECHO TRANSESOPHAGEAL: CPT | Mod: 26 | Performed by: INTERNAL MEDICINE

## 2019-03-13 PROCEDURE — 40000239 ZZH STATISTIC VAT ROUNDS

## 2019-03-13 PROCEDURE — 93325 DOPPLER ECHO COLOR FLOW MAPG: CPT | Mod: 26 | Performed by: INTERNAL MEDICINE

## 2019-03-13 PROCEDURE — A9270 NON-COVERED ITEM OR SERVICE: HCPCS | Mod: GY | Performed by: HOSPITALIST

## 2019-03-13 PROCEDURE — 99232 SBSQ HOSP IP/OBS MODERATE 35: CPT | Performed by: STUDENT IN AN ORGANIZED HEALTH CARE EDUCATION/TRAINING PROGRAM

## 2019-03-13 PROCEDURE — G0463 HOSPITAL OUTPT CLINIC VISIT: HCPCS

## 2019-03-13 PROCEDURE — 25000125 ZZHC RX 250: Performed by: INTERNAL MEDICINE

## 2019-03-13 PROCEDURE — 25000128 H RX IP 250 OP 636: Performed by: STUDENT IN AN ORGANIZED HEALTH CARE EDUCATION/TRAINING PROGRAM

## 2019-03-13 RX ORDER — FENTANYL CITRATE 50 UG/ML
25-50 INJECTION, SOLUTION INTRAMUSCULAR; INTRAVENOUS
Status: COMPLETED | OUTPATIENT
Start: 2019-03-13 | End: 2019-03-13

## 2019-03-13 RX ORDER — NALOXONE HYDROCHLORIDE 0.4 MG/ML
.1-.4 INJECTION, SOLUTION INTRAMUSCULAR; INTRAVENOUS; SUBCUTANEOUS
Status: DISCONTINUED | OUTPATIENT
Start: 2019-03-13 | End: 2019-03-13

## 2019-03-13 RX ORDER — LIDOCAINE 50 MG/G
0.5 OINTMENT TOPICAL ONCE
Status: COMPLETED | OUTPATIENT
Start: 2019-03-13 | End: 2019-03-13

## 2019-03-13 RX ORDER — SODIUM CHLORIDE 9 MG/ML
INJECTION, SOLUTION INTRAVENOUS CONTINUOUS PRN
Status: DISCONTINUED | OUTPATIENT
Start: 2019-03-13 | End: 2019-03-13

## 2019-03-13 RX ORDER — ACYCLOVIR 200 MG/1
9.5 CAPSULE ORAL
Status: DISCONTINUED | OUTPATIENT
Start: 2019-03-13 | End: 2019-03-13

## 2019-03-13 RX ORDER — FLUMAZENIL 0.1 MG/ML
0.2 INJECTION, SOLUTION INTRAVENOUS
Status: DISCONTINUED | OUTPATIENT
Start: 2019-03-13 | End: 2019-03-13

## 2019-03-13 RX ORDER — FENTANYL CITRATE 50 UG/ML
25 INJECTION, SOLUTION INTRAMUSCULAR; INTRAVENOUS
Status: DISCONTINUED | OUTPATIENT
Start: 2019-03-13 | End: 2019-03-13

## 2019-03-13 RX ORDER — FUROSEMIDE 10 MG/ML
40 INJECTION INTRAMUSCULAR; INTRAVENOUS ONCE
Status: COMPLETED | OUTPATIENT
Start: 2019-03-13 | End: 2019-03-13

## 2019-03-13 RX ORDER — LIDOCAINE 40 MG/G
CREAM TOPICAL
Status: DISCONTINUED | OUTPATIENT
Start: 2019-03-13 | End: 2019-03-13

## 2019-03-13 RX ORDER — GLYCOPYRROLATE 0.2 MG/ML
0.1 INJECTION, SOLUTION INTRAMUSCULAR; INTRAVENOUS ONCE
Status: COMPLETED | OUTPATIENT
Start: 2019-03-13 | End: 2019-03-13

## 2019-03-13 RX ADMIN — RIVAROXABAN 20 MG: 20 TABLET, FILM COATED ORAL at 16:19

## 2019-03-13 RX ADMIN — FENTANYL CITRATE 25 MCG: 50 INJECTION, SOLUTION INTRAMUSCULAR; INTRAVENOUS at 10:23

## 2019-03-13 RX ADMIN — CEFAZOLIN SODIUM 2 G: 2 INJECTION, SOLUTION INTRAVENOUS at 03:32

## 2019-03-13 RX ADMIN — GABAPENTIN 600 MG: 300 CAPSULE ORAL at 12:06

## 2019-03-13 RX ADMIN — TOPICAL ANESTHETIC 0.5 ML: 200 SPRAY DENTAL; PERIODONTAL at 10:09

## 2019-03-13 RX ADMIN — FENTANYL CITRATE 25 MCG: 50 INJECTION, SOLUTION INTRAMUSCULAR; INTRAVENOUS at 10:21

## 2019-03-13 RX ADMIN — Medication 1000 MCG: at 12:06

## 2019-03-13 RX ADMIN — Medication 100 MG: at 12:05

## 2019-03-13 RX ADMIN — GLYCOPYRROLATE 0.1 MG: 0.2 INJECTION, SOLUTION INTRAMUSCULAR; INTRAVENOUS at 09:06

## 2019-03-13 RX ADMIN — MIDAZOLAM HYDROCHLORIDE 1 MG: 1 INJECTION, SOLUTION INTRAMUSCULAR; INTRAVENOUS at 10:34

## 2019-03-13 RX ADMIN — LIDOCAINE 0.5 G: 50 OINTMENT TOPICAL at 09:55

## 2019-03-13 RX ADMIN — FUROSEMIDE 40 MG: 10 INJECTION, SOLUTION INTRAVENOUS at 16:19

## 2019-03-13 RX ADMIN — CEFAZOLIN SODIUM 2 G: 2 INJECTION, SOLUTION INTRAVENOUS at 12:19

## 2019-03-13 RX ADMIN — RANITIDINE 150 MG: 150 TABLET ORAL at 12:06

## 2019-03-13 RX ADMIN — GABAPENTIN 600 MG: 300 CAPSULE ORAL at 20:21

## 2019-03-13 RX ADMIN — BUMETANIDE 1 MG: 0.25 INJECTION INTRAMUSCULAR; INTRAVENOUS at 12:05

## 2019-03-13 RX ADMIN — CARVEDILOL 3.12 MG: 3.12 TABLET, FILM COATED ORAL at 18:49

## 2019-03-13 RX ADMIN — THERA TABS 1 TABLET: TAB at 12:06

## 2019-03-13 RX ADMIN — MIDAZOLAM HYDROCHLORIDE 1 MG: 1 INJECTION, SOLUTION INTRAMUSCULAR; INTRAVENOUS at 10:19

## 2019-03-13 RX ADMIN — FENTANYL CITRATE 50 MCG: 50 INJECTION, SOLUTION INTRAMUSCULAR; INTRAVENOUS at 10:19

## 2019-03-13 RX ADMIN — FOLIC ACID 1 MG: 1 TABLET ORAL at 12:06

## 2019-03-13 RX ADMIN — CEFAZOLIN SODIUM 2 G: 2 INJECTION, SOLUTION INTRAVENOUS at 20:21

## 2019-03-13 ASSESSMENT — ACTIVITIES OF DAILY LIVING (ADL)
ADLS_ACUITY_SCORE: 30
ADLS_ACUITY_SCORE: 32
ADLS_ACUITY_SCORE: 34
ADLS_ACUITY_SCORE: 34
ADLS_ACUITY_SCORE: 30
ADLS_ACUITY_SCORE: 34

## 2019-03-13 NOTE — PROGRESS NOTES
SW:    D: Per MD, ESPERANZA likely won't be done until 3/13, delaying discharge. FAHEEM has informed [facility] [employer] of delay.     P: FAHEEM will continue to follow for discharge planning.     TREMAYNE Everett

## 2019-03-13 NOTE — PLAN OF CARE
Pt is A/O x4. Lift. Total care, t/r q2hr. VSS on RA, soft BP (99/52). LS diminished. ESPERANZA today, negative per RN report. Edema +3/+4 to LE, hips, scrotum and abdomen. Lymphedema wrap reapplied to LLE, LBKA. Wound care done to wounds on patricia stumps and back. WOC RN present at the time of wound care. L side of abdomen is more distended and erythema is marked. Continues on IV Bumex. Continues on IV Ancef. Midline in place, patent, good blood return. Voiding per urinal, dribbles at times.

## 2019-03-13 NOTE — PROGRESS NOTES
Sedation Post Procedure Summary:    Immediately prior to starting the procedure a Time Out was conducted with procedural staff and re-confirmed the patient s name, procedure, and site/side. MD completed sedation assessment.     Consent obtained from patient after discussing the risks, benefits and alternatives.       Indication:  Sedation was required to allow for ESPERANZA    Sedatives: Fentanyl 100 Mcg and Versed 2 Mg    Vital signs, airway, and pulse oximetry were monitored throughout the procedure and sedation was maintained until the procedure was complete.      Patient tolerance: Patient tolerated the procedure well with no immediate complications.    Post-procedure report given to: Primary RN and pt transferred to station 66 by cart    (See Doc Flow-sheets and MAR for additional information)    Von Del Castillo

## 2019-03-13 NOTE — PLAN OF CARE
PT: Pt off the unit at a test. PT was scheduled to remove lymph wraps at 9:30am. RN, please remove lymph wraps when pt returns to the floor, keep off for one hour and complete skin/wound care and re-apply wraps.

## 2019-03-13 NOTE — PROGRESS NOTES
[facility]  WO Nurse Inpatient Wound Assessment      Assessment of wound(s) on pt's:   Lt posterior stump, buttock, mid-back         Data:   Patient History:     Per MD note, patient is a 73 year old male with hx of Atrial Fibrillation, CAD, CKD, PVD with LLE BKA and alcoholic cirrhosis. He fell at home about 2 weeks ago while drinking, with subsequent left thigh/buttock  hematoma and bruising.         Ottoniel Risk Assessment    Sensory Perception: 3-->slightly limited    Moisture: 3-->occasionally moist   Activity: 2-->chairfast     Mobility: 3-->slightly limited   Nutrition: 3-->adequate   Friction and Shear: 1-->problem  Ottoniel Score: 15        ? Positioning: Rt/Lt positioning with pillows  ? Mattress:  Atmos air       Moisture Management:  Incontinence protocol       Current Diet / Nutrition:     Combination Diet Regular Diet Adult        Labs:          Recent Labs   Lab Test 03/01/19  1210 02/28/19  1625   05/18/18  1641   ALBUMIN  --  2.7*   < >  --    HGB 7.1* 6.3*   < > 10.6*   INR 3.85* 7.45*   < >  --    WBC 6.5 6.8   < > 4.4   A1C  --   --   --  4.6   CRP 34.3*  --   --   --     < > = values in this interval not displayed.         Wound Assessment (location):   Lt posterior stump 3-1-19   Wound History:    LLE stump: only trace edema at this time, pt has been getting compression to left stump:  No real defined wounds on either leg at this time. The left stump has diffuse erythema throughout the posterior area and dried, crusty, peeling tissue on the lateral leg  Right LE is very heavy and edematous, +3-4 edema, pale    03-13-19 bilateral anterior LE / woc photo    03-13-19 bilateral posterior LE/ woc photo      03-01-19 posterior Left stump/ WOC photo       Midline spine  7cm x 5cm area of erythema and irritated looking tissue.  The outer margins of the erythema is blanchable then transitions to some non blanchable areas of pink, bright red erythema.  The center has a 4.3cm x 2.7cm area  "of dry red dermal tissue with curled, dried crust of old scabs.  Painful to touch, spine is very prominent here      03-13-19 midline-right middle of back/ WOC photo                    Intervention:     Patient's chart evaluated.      Wound(s) assessed with the RN as noted above    Orders : Reviewed, updated    Supplies reviewed    Discussed plan of care with nurse and patient         Assessment:   Lt posterior stump: superficial wounds have now healed to spotty, lifting, light scabs and peeling epidermis.  Will discontinue wound care and order \"skin care\" with the compression   Mid-back: Shearing vs pressure wound, will continue with mepilex dressing for protection         Plan:     Nursing to notify the Provider(s) and re-consult the [facility] Nurse if wounds deteriorate or if the wound care plan needs reevaluation.          Plan of care for bilateral LE: daily and prn  1. Clean legs from toes to knees with gentle soap and water, making sure to actually clean between the toes!  Rinse, dry  2. Spray the skin from toes to knees, not between the toes, with a thick layer of William Cleanse and Protect, massage into skin and allow sit on skin for 5-10 minutes then wipe or rinse off (the William will \"condition\" the skin by loosening crusty debris, moisturizing and cleaning the skin)  3. Apply a good lotion from toes to knees, not between toes (lotion between toes will keep area too moist and set up for possible fungal rash)  4. If noticing redness, itching, odor from between toes then dust with antifungal powder x            days, then stop   5. Wear clean socks every day  6. Keep heels elevated at all times in the bed (elevate by placing one pillow under each calf, from knee to heel and assure heels are off loaded, may need more pillows)    Plan of care for middle of back   1. Clean wound with saline or MicroKlenz Spray, pat dry  2. Paint periwound tissue with No Sting Skin Prep (#[id number]) and allow to dry thoroughly  3. Press a " "Mepilex  Sacral Dressing (PS#[id number])  to the area, making sure to conform nicely to skin curvatures   4. Time and date dressing change and ana with a \"T\" for treatment of a wound  5. Reposition pt every 1 to 2 hours when in bed and hourly when up to the chair to relieve pressure and promote perfusion to tissue  NOTE- continue to assess under dressing and document findings BID, per protocol    Position pt only side to side and with leg/ stump elevated     WOC will return weekly                  "

## 2019-03-13 NOTE — PROGRESS NOTES
"CLINICAL NUTRITION SERVICES - REASSESSMENT NOTE      Malnutrition: (3/1)  % Weight Loss:  Pt denies  % Intake:  No decreased intake noted  Subcutaneous Fat Loss:  None observed  Muscle Loss:  None observed  Fluid Retention:  Likely not nutrition-related     Malnutrition Diagnosis: Patient does not meet two of the above criteria necessary for diagnosing malnutrition       EVALUATION OF PROGRESS TOWARD GOALS   Diet: Regular diet, Boost Plus 2 pm daily     Intake/Tolerance:    Attempted to visit patient today, unable to wake to writers voice.     Chart review. Pt with variable 0-75% meal consumption TID (mostly between 50-75%). Unable to determine if pt drinking Boost supplements at this time (previously taking it 100%)     Per review of meal ordering system, pt took between 60-70 grams protein yesterday on 3/12     NEW FINDINGS:   Discharge to [facility] delayed d/t ESPERANZA today     WOCN =   Lt posterior stump: superficial wounds have now healed to spotty, lifting, light scabs and peeling epidermis.  Will discontinue wound care and order \"skin care\" with the compression   Mid-back: Shearing vs pressure wound, will continue with mepilex dressing for protection    Previous Goals:   Pt will consume at least 80 gm protein daily  Evaluation: Not met    Previous Nutrition Diagnosis:   Inadequate protein intake related to limited meal pattern as evidenced by diet hx   Evaluation: No change      CURRENT NUTRITION DIAGNOSIS  Inadequate protein intake related to limited meal pattern as evidenced by diet hx and record of 60-70 grams protein taken yesterday     INTERVENTIONS  Recommendations / Nutrition Prescription  Continue regular diet and Boost supplements    Implementation  No implementation at this time     Goals  Pt will consume at least 80 grams protein daily       MONITORING AND EVALUATION:  Progress towards goals will be monitored and evaluated per protocol and Practice Guidelines      Mckenzie Santamaria RD, LD  Clinical " Dietitian

## 2019-03-13 NOTE — PROGRESS NOTES
[facility]  Infectious Disease Progress Note          Assessment and Plan:     IMPRESSION:   1.  A 73-year-old male with vague possibly several week illness of malaise, fatigue, anemia, multiple falls including ecchymosis and injuries.  No significant fever or perceived infection, then fever spike in the hospital.  Blood cultures 2/2 rapidly positive for Staph aureus.  Conceivably hospital-acquired infection, ecchymosis/injury related infection, but much higher probability this is the actual underlying main primary diagnosis, i.e., Staph aureus bacteremia syndrome explaining his clinical illness of several weeks.   2.  Methicillin-sensitive Staph aureus bacteremia, high grade, possible endocarditis given major valve disease, no clear secondary sites, but multiple pain sites including back pain where imaging was negative.   3.  Positive UA and UC for enterococcus, significance not clear, no actual focal symptoms or anatomic abnormalities.  Unlikely to be relevant.   4.  Acute on chronic renal insufficiency.   5.  Acute on chronic anemia, very likely related to the current acute illness.   6.  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   7.  Multiple valve abnormalities, aortic stenosis, mitral regurgitation and tricuspid regurgitation, loud impressive murmur.   8.  Atrial fibrillation on chronic anticoagulation.   9.  Coronary artery disease.   10.  CIPRO ALLERGY.   11.  Chronic alcohol abuse.   12.  Prior subdural hematoma.   13.  Multiple joints in place with replacements that do not look infected.   14 apparent chronic low grade lymphoma, status unclear  15 Prior PE and antiphospholipid syndrome     RECOMMENDATIONS:   1.  Ancef  q8 to complete 4 weeks  2.  Blood cultures  Neg in Follow-up ,  midline  3.  Given the multiple valve abnormalities and high-grade bacteremia, ESPERANZA pending after neg EGD,  if no major issue Ok disposition, orders in             "Interval History:   Improved cognition and overall no new pain site T down, creat better Follow-up BC neg FU              Medications:       bumetanide  1 mg Intravenous BID     carvedilol  3.125 mg Oral BID w/meals     ceFAZolin  2 g Intravenous Q8H     cyanocobalamin  1,000 mcg Sublingual Daily     fluticasone  1 spray Both Nostrils Daily     folic acid  1 mg Oral Daily     gabapentin  600 mg Oral BID     multivitamin, therapeutic  1 tablet Oral Daily     ranitidine  150 mg Oral Daily     rivaroxaban ANTICOAGULANT  20 mg Oral Daily with supper     sodium chloride (PF)  10 mL Intracatheter Q8H     sodium chloride (PF)  3 mL Intracatheter Q8H     sodium chloride (PF)  3 mL Intracatheter Q8H     vitamin B1  100 mg Oral Daily                  Physical Exam:   Blood pressure 99/52, pulse 71, temperature 98  F (36.7  C), temperature source Oral, resp. rate 16, height 1.854 m (6' 1\"), weight 108.3 kg (238 lb 12.1 oz), SpO2 95 %.  Wt Readings from Last 2 Encounters:   03/11/19 108.3 kg (238 lb 12.1 oz)   02/20/19 90.7 kg (200 lb)     Vital Signs with Ranges  Temp:  [97.8  F (36.6  C)-98.9  F (37.2  C)] 98  F (36.7  C)  Pulse:  [54-84] 71  Heart Rate:  [60-63] 60  Resp:  [14-18] 16  BP: ()/(52-67) 99/52  SpO2:  [93 %-99 %] 95 %    Constitutional: improved cooperative, no apparent distress chronic ill appearance   Lungs: Clear to auscultation bilaterally, no crackles or wheezing   Cardiovascular: Regular rate and rhythm, normal S1 and S2, and loud  murmur noted   Abdomen: Normal bowel sounds, soft, non-distended, non-tender   Skin: No rashes, no cyanosis,sig edema ecchymosis, no clear involved joints   Other:           Data:   All microbiology laboratory data reviewed.  Recent Labs   Lab Test 03/11/19  0855 03/10/19  0850 03/09/19  0819  03/06/19  0940 03/03/19  0916 03/02/19  0720   WBC  --   --   --   --  5.0 8.2 10.3   HGB 8.2* 8.6* 8.4*   < > 7.6* 7.5* 7.4*   HCT  --   --   --   --  23.8* 23.3* 24.0*   MCV  --   " --   --   --  100 100 102*   PLT  --   --   --   --  167 212 289    < > = values in this interval not displayed.     Recent Labs   Lab Test 03/11/19  0855 03/10/19  0850 03/09/19  0819   CR 1.22 1.14 1.22     No lab results found.  Recent Labs   Lab Test 03/08/19  0827 03/07/19  0906 03/06/19  0940 03/05/19  1755 03/05/19  1753 03/03/19  1320 03/03/19  0922 03/03/19  0915 12/07/18  1321   CULT No growth after 5 days No growth No growth No growth No growth >100,000 colonies/mL  Enterococcus faecalis  *  <10,000 colonies/mL  urogenital aranza  Susceptibility testing not routinely done   Cultured on the 1st day of incubation:  Staphylococcus aureus  *  Critical Value/Significant Value, preliminary result only, called to and read back by  Jg Reddy RN, @2242 03/03/19..    Susceptibility testing done on previous specimen Cultured on the 1st day of incubation:  Staphylococcus aureus  *  Critical Value/Significant Value, preliminary result only, called to and read back by  Jonas Reddy RN, @2572 03/03/19..    (Note)  POSITIVE for STAPHYLOCOCCUS AUREUS and NEGATIVE for the mecA gene  (not MRSA) by [employer] multiplex nucleic acid test. The mecA gene was  not detected. Final identification and antimicrobial susceptibility  testing will be verified by standard methods.    Specimen tested with Verigene multiplex, gram-positive blood culture  nucleic acid test for the following targets: Staph aureus, Staph  epidermidis, Staph lugdunensis, other Staph species, Enterococcus  faecalis, Enterococcus faecium, Streptococcus species, S. agalactiae,  S. anginosus grp., S. pneumoniae, S. pyogenes, Listeria sp., mecA  (methicillin resistance) and Rad/B (vancomycin resistance).    Critical Value/Significant Value called to and read back by Alma Mayfield RN  3.4.19 GEOVANY.   >100,000 colonies/mL  Enterococcus faecalis  *

## 2019-03-13 NOTE — PLAN OF CARE
A&Ox4, but somewhat agitated and refusing cares and repositioning. Education given about turn/repo for skin, pt still declines. VSS on RA. Up A1-2/lift. +3-4 generalized edema. Voiding adequately in urinal. C/o phantom pain in L foot. Mepilex on coccyx with small dried drainage. Plan for ESPERANZA today, was NPO at midnight. Continue to monitor and encourage repositioning.

## 2019-03-13 NOTE — PROGRESS NOTES
[facility]    Hospitalist Progress Note    Date of Service: 03/13/2019    Assessment & Plan      Antonio Ramirez is a 73 year old male with PMHx of alcoholic liver cirrhosis, CKD, hypertension, CAD with 3V CABG in 2007, afib, HFpEF, hx of DVT and anticardiolipin Ab, hx of L BKA, hx of UTIs, and hx of traumatic SDH in 4/2018 who was admitted on 2/28/2019 after a fall with resulting buttock pain and LLE hematoma in the setting of anticoagulation with supratherapuetic INR.     Hospital course has been complicated by the development of MSSA bacteremia.    Left leg hematoma in the setting of anticoagulation  Fall at home, presumed mechanical in the setting of alcohol abuse  Supratherapeutic INR in the setting of anticoagulation/alcohol use, status post reversal  Physical deconditioning due to acute illness/chronic debility  Low Back Pain: Improved  Patient brought to the ED via EMS for buttock pain after a fall which had occurred 10d prior. This represented his 3rd visit in recent weeks for complaints of weakness (seen in ED on 2/17 and 2/20). Endorsed he had been mostly bed-bound during the prior 10d and that he was now interested in going to TCU. Had been drinking 4-5 drinks of Vodka daily. Exam was notable for bruising on the buttock and left thigh and LLE edema, consistent with hematoma. General surgery consulted. At time of presentation, INR was 7.45 and he was given vitamin K for reversal. Nonsurgical management recommended. Serial hgbs remained stable. Evaluated by wound care for skin tears on hematoma.     During the course of his stay, he, underwent additional workup and imaging -- US of LLE showed no evidence of DVT. CT left hip showed multiple intramuscular hematomas throughout the LLE most conspicuously involving the gluteus musculature and posterior compartment. Xray of lumbar spine showed only disc space narrowing. MRI of the lumbar spine showed multiple level degenerative disc disease with  severe central canal stenosis at L4-L5 with moderate bilateral neural foraminal stenosis and mod to severe bilateral neural foraminal stenosis at L5-S1.  Plan:  -- cont pain control with prn Tylenol and prn oxycodone  -- cont fall precautions  -- will need TCU stay at discharge   -- initially kept off anticoagulation this stay dt falls, hematoma and anemia but given underlying hypercoagulable disorder will need to resume at some point -- hematoma healing and hgbs stable, presently lift dependent with plans to go to TCU at discharge (in a monitored setting thus theoretically decreasing his fall risk) -- Xarelto was resumed on 3/8 as below, was being held for paracentesis, resumed 03    MSSA Bacteremia  Became febrile on 3/3 AM with Tmax 102.5. Denied cough, sob, rash or sx of UTI. UA showed mod leuk est and 23 WBCs. CXR with possible RLL infiltrate. Urine culture grew >100k enterococcus (adequately treated during stay). Blood cultures obtained and subsequently grew MSSA. Source of bacteremia unclear, possibly related to falls.   -- ID following, on Ancef 2g IV q8h, midline placed, will need 2 weeks of ABX  -- daily blood cultures to confirm clearing of bacteremia (blood cultures from 3/5 remain neg)  -- given his underlying valve dysfunction as below -- ESPERANZA done 03/13, waiting for final report     Acute on chronic anemia, due to hematoma/dilution, with macrocytosis  Baseline hemoglobin between 7.5-8.5.  Presented with a hemoglobin of 6.3 and was transfused 1U PRBC. Hgbs no remain stable at 8  -- Xarelto resumed 3/8, then held 3/9 - 3/11 given anticipate procedures (paracentesis, EGD, and ESPERANZA)  -- transfuse if hgb < 7  -- started on Vitamin B12 supplement     History of alcohol abuse  Alcoholic liver cirrhosis with ascites, portal hypertension  Anasarca  Hx of paracentesis, most recently in 12/2018.   Drinks approx 1/3rd bottle of vodka a day at home. No withdrawal symptoms on this past hospital admission. Monitored  on CIWA protocol on admission. No withdrawal sx observed so this was ultimately stopped.   -- patient complaining of increased abd distension and requesting paracentesis on 3/9, abd exam not overly impressive for ascites but patient insisted on having a paracentesis this stay -- as such, paracentesis was ordered on 3/9 -- given it was ordered on a Saturday and was non-emergent, was done 03/11  -- EGD showed no varices  -- held Xarelto on 3/9 - 03/10, now resumed  -- cont MVI, thiamine and folate   -- IV Lasix 40 mg once     Antiphospholipid Antibody Syndrome (AAS), IVC filter in place, on chronic anticoagulation  History of PE and DVT:   As above, Xarelto and aspirin held this stay given presentation with hematoma and acute blood loss anemia  -- as above, hgbs stable and is now in a monitored setting with plans to transition to TCU, reducing risk of falls   -- Xarelto resumed on 3/8; held 3/9 and 3/10  -- Xarelto resumed  -- aspirin remains on hold     Chronic Systolic CHF EF 40-45%.  Severe AS, mod MR, mod to severe TR, mild ascending aorta dilatation.  CAD with hx of 3V CABG  Thoracic aortic aneurysm without rupture  Hypertension  Hyperlipidemia  PTA meds: Coreg 3.125mg BID, torsemide 20mg daily, aspirin 81mg daily  Troponin on admission 0.030. EKG showed atrial fibrillation with slow ventricular response and nonspecific ST-T wave changes. Patient without chest pain. Echo obtained this stay for evaluation of heart function/valvular abnormalities showed no significant changes compared to previous -- EF 40-45%, ongoing severe aortic stenosis, MR and TR.  -- conts on Coreg with hold parameters  -- diuretic held on admission dt worsening renal function; renal function improved and was noted to have significant peripheral edema, resumed diuresis with Bumex 1mg IV daily on 3/8  -- given ongoing LE and scrotal edema and stable renal function, increased Bumex to 1mg IV BID on 3/9 with good response  -- monitor Is/Os  --  cont lymphedema wraps -- discussed with PT and RN, given worsening scrotal edema (likely exacerbated by wraps), will remove RLE wrap and keep wrap on LLE stump -- hope to improve edema so that he will be able to resume wearing his prosthesis  -- consider scrotal sling to help with ongoing edema  -- resumed Xarelto on 3/8 as above but now on hold for procedures, aspirin remains on hold  -- aspirin on hold this stay  -- not on statin dt hx of intolerance    Paroxysmal atrial fibrillation on chronic anticoagulation:  In NSR this stay.   -- conts on Coreg  -- as above Xarelto was held on admission, resumed on 3/8 then held again from 3/9 - 3/10 as above    ANSELMO on stage III CKD  Baseline Cr 1.2 - 1.4. Cr 1.96 on admission. Presume prerenal dt blood loss from hematoma. Cr improved after initial IVFs. Diuretics remained on hold and renal function subsequently returned to baseline  -- resumed diuresis on 3/8, renal function remains stable  -- monitor daily BMP    History of for subdural hematoma status post craniotomy 7/2018  Patient had a mechanical fall in the setting of anticoagulation, developed a subdural hematoma and had craniotomy and evacuation. Anticoagulation was on hold for a few weeks and later resumed.    GERD:  Not on meds prior to admission  -- BID H2 blocker added this stay      Low grade B cell lymphoproliferative disorder  History of prostate cancer s/p radiative seed implants   No acute issues.      Peripheral polyneuropathy.  PTA Gabapentin 600mg BID reduced to 300mg BID given renal injury -- increased back to home dose on 3/8     Pressure injury coccyx  Skin tear on stump of left BKA  Stage I pressure injury to the sacrum  Wound care team consulted. Appreciate recommendations.     FEN: off IVFs, lytes stable, regular diet  DVT Prophylaxis: PCDs  Code Status: Full Code    Disposition: Will ultimately discharge to [facility], hopefully tomorrow    William Shaffer    Interval History     Patient seen and  examined  No acute events overnight  ESPERANZA done, patient reported did not go as smooth as EGD  No fever, no cough  Reports feeling much more edematous over last couple days  No new complaints today    -Data reviewed today: I reviewed all new labs and imaging results over the last 24 hours. I personally reviewed no images or EKG's today.    Physical Exam   Temp: 98.5  F (36.9  C) Temp src: Oral BP: 111/66 Pulse: 62 Heart Rate: 60 Resp: 14 SpO2: 97 % O2 Device: None (Room air)    Vitals:    03/07/19 0308 03/10/19 0643 03/11/19 0520   Weight: 107.2 kg (236 lb 5.3 oz) 108.6 kg (239 lb 6.7 oz) 108.3 kg (238 lb 12.1 oz)     Vital Signs with Ranges  Temp:  [97.8  F (36.6  C)-98.9  F (37.2  C)] 98.5  F (36.9  C)  Pulse:  [54-84] 62  Heart Rate:  [60-63] 60  Resp:  [14-18] 14  BP: ()/(52-67) 111/66  SpO2:  [94 %-99 %] 97 %  I/O last 3 completed shifts:  In: 240 [P.O.:240]  Out: 700 [Urine:700]    Constitutional: Resting comfortably, alert and answering questions appropriately, NAD  Respiratory: CTAB, no wheeze/rales/rhonchi, no increased work of breathing  Cardiovascular: HR bradycardic with ++SM throughout precordium, ++bilateral LE edema extending up to the buttock and abdomen  GI: distended but S, NT, +BS  Skin/Integumen: warm/dry, healing ecchymosis noted on L hip and buttock   Other: Normal mood and affect    Medications     sodium chloride         bumetanide  1 mg Intravenous BID     carvedilol  3.125 mg Oral BID w/meals     ceFAZolin  2 g Intravenous Q8H     cyanocobalamin  1,000 mcg Sublingual Daily     fluticasone  1 spray Both Nostrils Daily     folic acid  1 mg Oral Daily     gabapentin  600 mg Oral BID     multivitamin, therapeutic  1 tablet Oral Daily     ranitidine  150 mg Oral Daily     rivaroxaban ANTICOAGULANT  20 mg Oral Daily with supper     sodium chloride (PF)  10 mL Intracatheter Q8H     sodium chloride (PF)  3 mL Intracatheter Q8H     vitamin B1  100 mg Oral Daily       Data   Recent Labs   Lab  03/11/19  0855 03/10/19  0850 03/09/19  0819   HGB 8.2* 8.6* 8.4*   INR  --  1.83*  --     138 136   POTASSIUM 3.4 3.8 4.4   CHLORIDE 106 107 108   CO2 27 24 22   BUN 24 26 30   CR 1.22 1.14 1.22   ANIONGAP 6 7 6   BENNIE 8.2* 8.4* 8.3*   GLC 80 67* 82       No results found for this or any previous visit (from the past 24 hour(s)).

## 2019-03-14 ENCOUNTER — APPOINTMENT (OUTPATIENT)
Dept: PHYSICAL THERAPY | Facility: CLINIC | Age: 74
DRG: 605 | End: 2019-03-14
Payer: MEDICARE

## 2019-03-14 VITALS
OXYGEN SATURATION: 93 % | BODY MASS INDEX: 31.64 KG/M2 | TEMPERATURE: 97.6 F | HEIGHT: 73 IN | WEIGHT: 238.76 LBS | SYSTOLIC BLOOD PRESSURE: 110 MMHG | DIASTOLIC BLOOD PRESSURE: 53 MMHG | HEART RATE: 66 BPM | RESPIRATION RATE: 16 BRPM

## 2019-03-14 LAB
ANION GAP SERPL CALCULATED.3IONS-SCNC: 7 MMOL/L (ref 3–14)
BACTERIA SPEC CULT: NO GROWTH
BUN SERPL-MCNC: 16 MG/DL (ref 7–30)
CALCIUM SERPL-MCNC: 8.3 MG/DL (ref 8.5–10.1)
CHLORIDE SERPL-SCNC: 104 MMOL/L (ref 94–109)
CO2 SERPL-SCNC: 28 MMOL/L (ref 20–32)
CREAT SERPL-MCNC: 1.06 MG/DL (ref 0.66–1.25)
GFR SERPL CREATININE-BSD FRML MDRD: 68 ML/MIN/{1.73_M2}
GLUCOSE SERPL-MCNC: 91 MG/DL (ref 70–99)
Lab: NORMAL
POTASSIUM SERPL-SCNC: 3.2 MMOL/L (ref 3.4–5.3)
SODIUM SERPL-SCNC: 139 MMOL/L (ref 133–144)
SPECIMEN SOURCE: NORMAL

## 2019-03-14 PROCEDURE — 25000132 ZZH RX MED GY IP 250 OP 250 PS 637: Mod: GY | Performed by: STUDENT IN AN ORGANIZED HEALTH CARE EDUCATION/TRAINING PROGRAM

## 2019-03-14 PROCEDURE — 25000128 H RX IP 250 OP 636: Performed by: INTERNAL MEDICINE

## 2019-03-14 PROCEDURE — 40000257 ZZH STATISTIC CONSULT NO CHARGE VASC ACCESS

## 2019-03-14 PROCEDURE — 97530 THERAPEUTIC ACTIVITIES: CPT | Mod: GP

## 2019-03-14 PROCEDURE — 80048 BASIC METABOLIC PNL TOTAL CA: CPT | Performed by: STUDENT IN AN ORGANIZED HEALTH CARE EDUCATION/TRAINING PROGRAM

## 2019-03-14 PROCEDURE — 25000132 ZZH RX MED GY IP 250 OP 250 PS 637: Mod: GY | Performed by: HOSPITALIST

## 2019-03-14 PROCEDURE — 40000239 ZZH STATISTIC VAT ROUNDS

## 2019-03-14 PROCEDURE — 25000132 ZZH RX MED GY IP 250 OP 250 PS 637: Mod: GY | Performed by: INTERNAL MEDICINE

## 2019-03-14 PROCEDURE — A9270 NON-COVERED ITEM OR SERVICE: HCPCS | Mod: GY | Performed by: INTERNAL MEDICINE

## 2019-03-14 PROCEDURE — 99239 HOSP IP/OBS DSCHRG MGMT >30: CPT | Performed by: STUDENT IN AN ORGANIZED HEALTH CARE EDUCATION/TRAINING PROGRAM

## 2019-03-14 PROCEDURE — A9270 NON-COVERED ITEM OR SERVICE: HCPCS | Mod: GY | Performed by: HOSPITALIST

## 2019-03-14 PROCEDURE — A9270 NON-COVERED ITEM OR SERVICE: HCPCS | Mod: GY | Performed by: STUDENT IN AN ORGANIZED HEALTH CARE EDUCATION/TRAINING PROGRAM

## 2019-03-14 PROCEDURE — 36415 COLL VENOUS BLD VENIPUNCTURE: CPT | Performed by: STUDENT IN AN ORGANIZED HEALTH CARE EDUCATION/TRAINING PROGRAM

## 2019-03-14 RX ORDER — LANOLIN ALCOHOL/MO/W.PET/CERES
3 CREAM (GRAM) TOPICAL
Status: ON HOLD | DISCHARGE
Start: 2019-03-14 | End: 2019-06-19

## 2019-03-14 RX ORDER — POTASSIUM CHLORIDE 1500 MG/1
20 TABLET, EXTENDED RELEASE ORAL ONCE
Status: DISCONTINUED | OUTPATIENT
Start: 2019-03-14 | End: 2019-03-14 | Stop reason: HOSPADM

## 2019-03-14 RX ORDER — POTASSIUM CL/LIDO/0.9 % NACL 10MEQ/0.1L
10 INTRAVENOUS SOLUTION, PIGGYBACK (ML) INTRAVENOUS
Status: DISCONTINUED | OUTPATIENT
Start: 2019-03-14 | End: 2019-03-14 | Stop reason: HOSPADM

## 2019-03-14 RX ORDER — POTASSIUM CHLORIDE 1500 MG/1
20-40 TABLET, EXTENDED RELEASE ORAL
Status: DISCONTINUED | OUTPATIENT
Start: 2019-03-14 | End: 2019-03-14 | Stop reason: HOSPADM

## 2019-03-14 RX ORDER — POTASSIUM CHLORIDE 29.8 MG/ML
20 INJECTION INTRAVENOUS
Status: DISCONTINUED | OUTPATIENT
Start: 2019-03-14 | End: 2019-03-14 | Stop reason: HOSPADM

## 2019-03-14 RX ORDER — POTASSIUM CHLORIDE 7.45 MG/ML
10 INJECTION INTRAVENOUS
Status: DISCONTINUED | OUTPATIENT
Start: 2019-03-14 | End: 2019-03-14 | Stop reason: HOSPADM

## 2019-03-14 RX ORDER — TORSEMIDE 10 MG/1
20 TABLET ORAL 2 TIMES DAILY
Qty: 30 TABLET | Refills: 1 | DISCHARGE
Start: 2019-03-14 | End: 2019-04-07 | Stop reason: DRUGHIGH

## 2019-03-14 RX ORDER — POTASSIUM CHLORIDE 1.5 G/1.58G
20-40 POWDER, FOR SOLUTION ORAL
Status: DISCONTINUED | OUTPATIENT
Start: 2019-03-14 | End: 2019-03-14 | Stop reason: HOSPADM

## 2019-03-14 RX ADMIN — CARVEDILOL 3.12 MG: 3.12 TABLET, FILM COATED ORAL at 09:44

## 2019-03-14 RX ADMIN — GABAPENTIN 600 MG: 300 CAPSULE ORAL at 09:44

## 2019-03-14 RX ADMIN — POTASSIUM CHLORIDE 40 MEQ: 1500 TABLET, EXTENDED RELEASE ORAL at 10:52

## 2019-03-14 RX ADMIN — Medication 100 MG: at 09:44

## 2019-03-14 RX ADMIN — THERA TABS 1 TABLET: TAB at 09:44

## 2019-03-14 RX ADMIN — FOLIC ACID 1 MG: 1 TABLET ORAL at 09:44

## 2019-03-14 RX ADMIN — CEFAZOLIN SODIUM 2 G: 2 INJECTION, SOLUTION INTRAVENOUS at 04:39

## 2019-03-14 RX ADMIN — RANITIDINE 150 MG: 150 TABLET ORAL at 09:44

## 2019-03-14 RX ADMIN — Medication 1000 MCG: at 09:44

## 2019-03-14 RX ADMIN — CEFAZOLIN SODIUM 2 G: 2 INJECTION, SOLUTION INTRAVENOUS at 11:16

## 2019-03-14 ASSESSMENT — ACTIVITIES OF DAILY LIVING (ADL)
ADLS_ACUITY_SCORE: 33
ADLS_ACUITY_SCORE: 33
ADLS_ACUITY_SCORE: 30
ADLS_ACUITY_SCORE: 29

## 2019-03-14 NOTE — PROGRESS NOTES
Discharge    [facility].  Care plan note    Pt is A/O x4. Lift. Total care, t/r q2hr. Refuses repo at times. VSS on RA, denies pain. LS diminished. Edema +3/+4 to LE, hips, scrotum and abdomen. Lymphedema wrap reapplied to LLE, LBKA. Wound care done to wounds on patricia stumps per plan of care. L side of abdomen is more distended and erythema is marked. MD aware. Continues on IV Ancef. Midline in place, patent, good blood return, dressing changed. Voiding per urinal, dribbles at times. [facility] at 1 pm.     Listed belongings gathered and returned to patient. Yes  Care Plan and Patient education resolved: Yes  Prescriptions if needed, hard copies sent with patient  NA  Home and hospital acquired medications returned to patient: NA  Medication Bin checked and emptied on discharge Yes  Follow up appointment made for patient: No

## 2019-03-14 NOTE — PROGRESS NOTES
[facility]  Infectious Disease Progress Note          Assessment and Plan:     IMPRESSION:   1.  A 73-year-old male with vague possibly several week illness of malaise, fatigue, anemia, multiple falls including ecchymosis and injuries.  No significant fever or perceived infection, then fever spike in the hospital.  Blood cultures 2/2 rapidly positive for Staph aureus.  Conceivably hospital-acquired infection, ecchymosis/injury related infection, but much higher probability this is the actual underlying main primary diagnosis, i.e., Staph aureus bacteremia syndrome explaining his clinical illness of several weeks.   2.  Methicillin-sensitive Staph aureus bacteremia, high grade, possible endocarditis given major valve disease, no clear secondary sites, but multiple pain sites including back pain where imaging was negative.   3.  Positive UA and UC for enterococcus, significance not clear, no actual focal symptoms or anatomic abnormalities.  Unlikely to be relevant.   4.  Acute on chronic renal insufficiency.   5.  Acute on chronic anemia, very likely related to the current acute illness.   6.  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   7.  Multiple valve abnormalities, aortic stenosis, mitral regurgitation and tricuspid regurgitation, loud impressive murmur.   8.  Atrial fibrillation on chronic anticoagulation.   9.  Coronary artery disease.   10.  CIPRO ALLERGY.   11.  Chronic alcohol abuse.   12.  Prior subdural hematoma.   13.  Multiple joints in place with replacements that do not look infected.   14 apparent chronic low grade lymphoma, status unclear  15 Prior PE and antiphospholipid syndrome     RECOMMENDATIONS:   1.  Ancef  q8 to complete 4 weeks  2.  Blood cultures  Neg in Follow-up ,  midline  3.  Given the multiple valve abnormalities and high-grade bacteremia, ESPERANZA with no major issue Ok disposition, orders in  4 At completion IV ABX recheck  "BC            Interval History:   Improved cognition and overall no new pain site T down, creat better Follow-up BC neg ESPERANZA noted              Medications:       carvedilol  3.125 mg Oral BID w/meals     ceFAZolin  2 g Intravenous Q8H     cyanocobalamin  1,000 mcg Sublingual Daily     fluticasone  1 spray Both Nostrils Daily     folic acid  1 mg Oral Daily     gabapentin  600 mg Oral BID     multivitamin, therapeutic  1 tablet Oral Daily     ranitidine  150 mg Oral Daily     rivaroxaban ANTICOAGULANT  20 mg Oral Daily with supper     sodium chloride (PF)  10 mL Intracatheter Q8H     sodium chloride (PF)  3 mL Intracatheter Q8H     vitamin B1  100 mg Oral Daily                  Physical Exam:   Blood pressure 109/57, pulse 66, temperature 97.6  F (36.4  C), temperature source Oral, resp. rate 16, height 1.854 m (6' 1\"), weight 108.3 kg (238 lb 12.1 oz), SpO2 93 %.  Wt Readings from Last 2 Encounters:   03/11/19 108.3 kg (238 lb 12.1 oz)   02/20/19 90.7 kg (200 lb)     Vital Signs with Ranges  Temp:  [97.6  F (36.4  C)-99  F (37.2  C)] 97.6  F (36.4  C)  Pulse:  [62-84] 66  Heart Rate:  [60-61] 61  Resp:  [14-16] 16  BP: ()/(52-67) 109/57  SpO2:  [93 %-99 %] 93 %    Constitutional: improved cooperative, no apparent distress chronic ill appearance   Lungs: Clear to auscultation bilaterally, no crackles or wheezing   Cardiovascular: Regular rate and rhythm, normal S1 and S2, and loud  murmur noted   Abdomen: Normal bowel sounds, soft, non-distended, non-tender   Skin: No rashes, no cyanosis,sig edema ecchymosis, no clear involved joints   Other:           Data:   All microbiology laboratory data reviewed.  Recent Labs   Lab Test 03/11/19  0855 03/10/19  0850 03/09/19  0819  03/06/19  0940 03/03/19  0916 03/02/19  0720   WBC  --   --   --   --  5.0 8.2 10.3   HGB 8.2* 8.6* 8.4*   < > 7.6* 7.5* 7.4*   HCT  --   --   --   --  23.8* 23.3* 24.0*   MCV  --   --   --   --  100 100 102*   PLT  --   --   --   --  028 038 " 289    < > = values in this interval not displayed.     Recent Labs   Lab Test 03/11/19  0855 03/10/19  0850 03/09/19  0819   CR 1.22 1.14 1.22     No lab results found.  Recent Labs   Lab Test 03/08/19  0827 03/07/19  0906 03/06/19  0940 03/05/19  1755 03/05/19  1753 03/03/19  1320 03/03/19  0922 03/03/19  0915 12/07/18  1321   CULT No growth No growth No growth No growth No growth >100,000 colonies/mL  Enterococcus faecalis  *  <10,000 colonies/mL  urogenital aranza  Susceptibility testing not routinely done   Cultured on the 1st day of incubation:  Staphylococcus aureus  *  Critical Value/Significant Value, preliminary result only, called to and read back by  Jg Reddy RN, @4761 03/03/19..    Susceptibility testing done on previous specimen Cultured on the 1st day of incubation:  Staphylococcus aureus  *  Critical Value/Significant Value, preliminary result only, called to and read back by  Jonas Reddy RN, @1020 03/03/19..    (Note)  POSITIVE for STAPHYLOCOCCUS AUREUS and NEGATIVE for the mecA gene  (not MRSA) by [employer] multiplex nucleic acid test. The mecA gene was  not detected. Final identification and antimicrobial susceptibility  testing will be verified by standard methods.    Specimen tested with [employer] multiplex, gram-positive blood culture  nucleic acid test for the following targets: Staph aureus, Staph  epidermidis, Staph lugdunensis, other Staph species, Enterococcus  faecalis, Enterococcus faecium, Streptococcus species, S. agalactiae,  S. anginosus grp., S. pneumoniae, S. pyogenes, Listeria sp., mecA  (methicillin resistance) and Rad/B (vancomycin resistance).    Critical Value/Significant Value called to and read back by Alma Mayfield RN  3.4.19 GEOVANY.   >100,000 colonies/mL  Enterococcus faecalis  *

## 2019-03-14 NOTE — PLAN OF CARE
PT:  Discharge Planner PT   Patient plan for discharge: Go to [place] [facility] soon  Current status: Pt required SBA for supine to sit, CGA sit to stand with FWW/L socket and prosthesis x 2 trials with height of bed elevated. Pt able to stand for approx 1 min each trial. Rolled to get onto the bed pan with SBA and use of rail.  Barriers to return to prior living situation: Needs assist with mobility, has not ambulated, falls risk.  Recommendations for discharge: TCU  Rationale for recommendations: Pt would benefit from continued PT to improve strength, balance, mobility to increase independence, reduce falls risk and increase safety before returning home.       Entered by: Marie Nino 03/14/2019 10:26 AM

## 2019-03-14 NOTE — PROGRESS NOTES
D: SW following for discharge planning.   I: Pt will discharge today [facility] via transport aide at 1230. Orders faxed and facility updated. Pt in agreement. Nursing aware.  P: Discharge today.     CRISTOBAL Loaiza, LGSW  [id number]    PAS-RR    D: Per [employer] regulation, SW completed and submitted PAS-RR to [place] [employer] via the [employer] Line.  PAS-RR confirmation # is : [id number]    I: SW spoke with pt and they are aware a PAS-RR has been submitted.  SW reviewed with pt that they may be contacted for a follow up appointment within 10 days of hospital discharge if their SNF stay is < 30 days.  Contact information for [facility] was also provided.    A: Pt verbalized understanding.    P: Further questions may be directed to [facility] LinkAge Line at #[phone], option #4 for PAS-RR staff.

## 2019-03-14 NOTE — DISCHARGE SUMMARY
[facility]  Hospitalist Discharge Summary       Date of Admission:  2/28/2019  Date of Discharge:  3/14/2019  Discharging Provider: William Shaffer MD      Discharge Diagnoses     Methicillin-sensitive Staph aureus bacteremia,  Traumatic left leg hematoma secondary to coagulopathy due to circulating anticoagulant (Xarelto)  Mechanical Fall  Alcoholic liver cirrhosis with ascites, portal hypertension  Anasarca  Chronic Systolic CHF    Follow-ups Needed After Discharge   Follow-up Appointments     Follow Up and recommended labs and tests      Follow up with long-term physician.  The following labs/tests are   recommended: BMP/hgb.           Unresulted Labs Ordered in the Past 30 Days of this Admission     No orders found from 12/30/2018 to 3/1/2019.        Discharge Disposition   Discharged to rehabilitation facility  Condition at discharge: Stable    Hospital Course         Antonio Ramirez is a 73 year old male with PMHx of alcoholic liver cirrhosis, CKD, hypertension, CAD with 3V CABG in 2007, afib, HFpEF, hx of DVT and anticardiolipin Ab, hx of L BKA, hx of UTIs, and hx of traumatic SDH in 4/2018 who was admitted on 2/28/2019 after a fall with resulting buttock pain and LLE hematoma in the setting of anticoagulation with supratherapuetic INR.     Hospital course has been complicated by the development of MSSA bacteremia.    Traumatic left leg hematoma secondary to coagulopathy due to circulating anticoagulant (Xarelto) and Supratherapeutic INR in the setting of anticoagulation/alcohol use,  Fall at home, presumed mechanical in the setting of alcohol abuse  Supratherapeutic INR in the setting of anticoagulation/alcohol use, status post reversal  Physical deconditioning due to acute illness/chronic debility  Low Back Pain: Improved  Patient brought to the ED via EMS for buttock pain after a fall which had occurred 10d prior. This represented his 3rd visit in recent weeks for complaints of weakness  (seen in ED on 2/17 and 2/20). Endorsed he had been mostly bed-bound during the prior 10d and that he was now interested in going to TCU. Had been drinking 4-5 drinks of Vodka daily. Exam was notable for bruising on the buttock and left thigh and LLE edema, consistent with hematoma. General surgery consulted. At time of presentation, INR was 7.45 and he was given vitamin K for reversal. Nonsurgical management recommended. Serial hgbs remained stable. Evaluated by wound care for skin tears on hematoma.     During the course of his stay, he, underwent additional workup and imaging -- US of LLE showed no evidence of DVT. CT left hip showed multiple intramuscular hematomas throughout the LLE most conspicuously involving the gluteus musculature and posterior compartment. Xray of lumbar spine showed only disc space narrowing. MRI of the lumbar spine showed multiple level degenerative disc disease with severe central canal stenosis at L4-L5 with moderate bilateral neural foraminal stenosis and mod to severe bilateral neural foraminal stenosis at L5-S1.  Plan:  -- cont pain control with prn Tylenol and prn Norco at discharge  -- cont fall precautions at TCU  -- initially kept off anticoagulation this stay dt falls, hematoma and anemia but given underlying hypercoagulable disorder will need to resume at some point -- hematoma healing and hgbs stable, presently lift dependent with plans to go to TCU at discharge (in a monitored setting thus theoretically decreasing his fall risk) -- Xarelto was resumed on 3/8 as below then, was being held for paracentesis, resumed 03/11/2019    MSSA Bacteremia  Became febrile on 3/3 AM with Tmax 102.5. Denied cough, sob, rash or sx of UTI. UA showed mod leuk est and 23 WBCs. CXR with possible RLL infiltrate. Urine culture grew >100k enterococcus (adequately treated during stay). Blood cultures obtained and subsequently grew MSSA. Source of bacteremia unclear, possibly related to falls.   --  ID following, on Ancef 2g IV q8h, midline placed, will need 2 weeks of ABX  -- daily blood cultures confirmed clearing of bacteremia (blood cultures from 3/5 remain neg) and will need repeat BCs after completion of IV abx  -- given his underlying valve dysfunction as below -- ESPERANZA done 03/13, no significant valvular endocarditis.      Acute on chronic anemia, due to hematoma/dilution, with macrocytosis  Baseline hemoglobin between 7.5-8.5.  Presented with a hemoglobin of 6.3 and was transfused 1U PRBC. Hgbs no remain stable at 8  -- Xarelto resumed 3/8, then held 3/9 - 3/11 given anticipate procedures (paracentesis, EGD, and ESPERANZA)  -- transfuse if hgb < 7  -- started on Vitamin B12 supplement     History of alcohol abuse  Alcoholic liver cirrhosis with ascites, portal hypertension  Anasarca  Hx of paracentesis, most recently in 12/2018.   Drinks approx 1/3rd bottle of vodka a day at home. No withdrawal symptoms on this past hospital admission. Monitored on CIWA protocol on admission. No withdrawal sx observed so this was ultimately stopped.   -- patient complaining of increased abd distension and requesting paracentesis on 3/9, abd exam not overly impressive for ascites but patient insisted on having a paracentesis this stay -- as such, paracentesis was ordered on 3/9 -- given it was ordered on a Saturday and was non-emergent, was done 03/11, with no complications  -- EGD showed no varices  -- held Xarelto on 3/9 - 03/10, now resumed at discharge  -- cont MVI, thiamine and folate     Antiphospholipid Antibody Syndrome (AAS), IVC filter in place, on chronic anticoagulation  History of PE and DVT:   As above, Xarelto and aspirin held this stay given presentation with hematoma and acute blood loss anemia  -- as above, hgbs stable and is now in a monitored setting with plans to transition to TCU, reducing risk of falls   -- Xarelto resumed on 3/8; held 3/9 and 3/10  -- Xarelto resumed  -- aspirin remains on  hold     Chronic Systolic CHF EF 40-45%.  Severe AS, mod MR, mod to severe TR, mild ascending aorta dilatation.  CAD with hx of 3V CABG  Thoracic aortic aneurysm without rupture  Hypertension  Hyperlipidemia  PTA meds: Coreg 3.125mg BID, torsemide 20mg daily, aspirin 81mg daily  Troponin on admission 0.030. EKG showed atrial fibrillation with slow ventricular response and nonspecific ST-T wave changes. Patient without chest pain. Echo obtained this stay for evaluation of heart function/valvular abnormalities showed no significant changes compared to previous -- EF 40-45%, ongoing severe aortic stenosis, MR and TR.  -- conts on Coreg at dischare  -- diuretic held on admission dt worsening renal function; renal function improved and was noted to have significant peripheral edema, resumed diuresis with Bumex 1mg IV daily on 3/8   -- given ongoing LE and scrotal edema and stable renal function, increased Bumex to 1mg IV BID with good response -> transitioned to Torsemide 20 mg BID  -- monitor Is/Os  -- cont lymphedema wraps -- discussed with PT and RN, given worsening scrotal edema (likely exacerbated by wraps), will remove RLE wrap and keep wrap on LLE stump -- hope to improve edema so that he will be able to resume wearing his prosthesis  -- consider scrotal sling to help with ongoing edema  -- aspirin on hold this stay, Xarelto continued  -- not on statin dt hx of intolerance    Paroxysmal atrial fibrillation on chronic anticoagulation:  In NSR this stay.   -- conts on Coreg  -- as above Xarelto was held on admission, resumed on 3/8 then held again from 3/9 - 3/10 as above  -- Resumed on 03/11 and at discharge    ANSELMO on stage III CKD  Baseline Cr 1.2 - 1.4. Cr 1.96 on admission. Presume prerenal dt blood loss from hematoma. Cr improved after initial IVFs. Diuretics remained on hold and renal function subsequently returned to baseline.  -- resumed diuresis on 3/8, renal function remains stable  -- At discharge  Torsemide 20 mg BID  -- BMP at follow up    History of for subdural hematoma status post craniotomy 7/2018  Patient had a mechanical fall in the setting of anticoagulation, developed a subdural hematoma and had craniotomy and evacuation. Anticoagulation was on hold for a few weeks and later resumed.    GERD:  Not on meds prior to admission  -- BID H2 blocker added this stay, continued at discharge      Low grade B cell lymphoproliferative disorder  History of prostate cancer s/p radiative seed implants   No acute issues. Follow as outpatient      Peripheral polyneuropathy.  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  Stage I pressure injury to the sacrum  Wound care team was consulted     Consultations This Hospital Stay   WOUND OSTOMY CONTINENCE NURSE  IP CONSULT  PHYSICAL THERAPY ADULT IP CONSULT  OCCUPATIONAL THERAPY ADULT IP CONSULT  SURGERY GENERAL IP CONSULT  WOUND OSTOMY CONTINENCE NURSE  IP CONSULT  PHARMACY TO DOSE PHYTONADIONE  SOCIAL WORK IP CONSULT  WOUND OSTOMY CONTINENCE NURSE  IP CONSULT  SOCIAL WORK IP CONSULT  PHARMACY TO DOSE VANCO  PHARMACY TO DOSE VANCO  PHYSICAL THERAPY ADULT IP CONSULT  OCCUPATIONAL THERAPY ADULT IP CONSULT  INFECTIOUS DISEASES IP CONSULT  SPEECH LANGUAGE PATH ADULT IP CONSULT  LYMPHEDEMA THERAPY IP CONSULT  VASCULAR ACCESS ADULT IP CONSULT  GASTROENTEROLOGY IP CONSULT  PSYCHIATRY IP CONSULT    Code Status   Full Code    Time Spent on this Encounter   I, William Shaffer, personally saw the patient today and spent greater than 30 minutes discharging this patient.       William Shaffer MD  [facility]  ______________________________________________________________________    Physical Exam   Vital Signs: Temp: 97.6  F (36.4  C) Temp src: Oral BP: 110/53 Pulse: 66 Heart Rate: 58 Resp: 16 SpO2: 93 % O2 Device: None (Room air)    Weight: 238 lbs 12.13  oz    Constitutional:    Resting comfortably, alert and answering questions appropriately, NAD  Respiratory: CTAB, no wheeze/rales/rhonchi, no increased work of breathing  Cardiovascular: HR bradycardic with ++SM throughout precordium, ++bilateral LE edema extending up to the buttock and abdomen.  GI: distended but S, NT, +BS  Skin/Integumen: warm/dry, healing ecchymosis noted on L hip and buttock   Other:   Normal mood and affect             Primary Care Physician   Main Hardy    Immunizations       Discharge Orders      General info for SNF    Length of Stay Estimate: Short Term Care: Estimated # of Days <30  Condition at Discharge: Stable  Level of care:skilled   Rehabilitation Potential: Fair  Admission H&P remains valid and up-to-date: Yes  Recent Chemotherapy: N/A  Use Nursing Home Standing Orders: Yes     Mantoux instructions    Give two-step Mantoux (PPD) Per Facility Policy Yes     Reason for your hospital stay    You had a Left leg hematoma in the setting of anticoagulation  Fall at home likely mechanical in the setting of alcohol abuse  High INR that needed reversal and physical deconditioning due to acute illness/chronic debility.     Intake and output    Every shift     Daily weights    Call Provider for weight gain of more than 2 pounds per day or 5 pounds per week.     Follow Up and recommended labs and tests    Follow up with [facility] physician.  The following labs/tests are recommended: BMP/hgb.     Activity - Up with assistive device     Activity - Up with nursing assistance     IV access    Midline in right upper extremity. Care per facility routine     Physical Therapy Adult Consult    Evaluate and treat as clinically indicated.    Reason:  Physical deconditioning     Occupational Therapy Adult Consult    Evaluate and treat as clinically indicated.    Reason:  Physical deconditioning     Fall precautions     Advance Diet as Tolerated    Follow this diet upon discharge: Orders Placed This  Encounter      Snacks/Supplements Adult: Boost Plus; Between Meals      Room Service      Low sodium < 2g daily, Cardiac diet       Significant Results and Procedures   Most Recent 3 CBC's:  Recent Labs   Lab Test 03/11/19  0855 03/10/19  0850 03/09/19  0819  03/06/19  0940 03/03/19  0916 03/02/19  0720   WBC  --   --   --   --  5.0 8.2 10.3   HGB 8.2* 8.6* 8.4*   < > 7.6* 7.5* 7.4*   MCV  --   --   --   --  100 100 102*   PLT  --   --   --   --  167 212 289    < > = values in this interval not displayed.     Most Recent 3 BMP's:  Recent Labs   Lab Test 03/14/19  0928 03/11/19  0855 03/10/19  0850    139 138   POTASSIUM 3.2* 3.4 3.8   CHLORIDE 104 106 107   CO2 28 27 24   BUN 16 24 26   CR 1.06 1.22 1.14   ANIONGAP 7 6 7   BENNIE 8.3* 8.2* 8.4*   GLC 91 80 67*     Most Recent 2 LFT's:  Recent Labs   Lab Test 02/28/19  1625 12/27/18  1210   AST 24 31   ALT 11 11   ALKPHOS 90 94   BILITOTAL 2.9* 1.3     Most Recent 3 INR's:  Recent Labs   Lab Test 03/10/19  0850 03/02/19  0720 03/01/19  1210   INR 1.83* 3.16* 3.85*     Most Recent 3 BNP's:  Recent Labs   Lab Test 04/07/18  1240   NTBNPI 4,334*     Most Recent D-dimer:No lab results found.  Most Recent 6 Bacteria Isolates From Any Culture (See EPIC Reports for Culture Details):  Recent Labs   Lab Test 03/08/19  0827 03/07/19  0906 03/06/19  0940 03/05/19  1755 03/05/19  1753 03/03/19  1320   CULT No growth No growth No growth No growth No growth >100,000 colonies/mL  Enterococcus faecalis  *  <10,000 colonies/mL  urogenital aranza  Susceptibility testing not routinely done       Most Recent Urinalysis:  Recent Labs   Lab Test 03/03/19  1320   COLOR Yellow   APPEARANCE Clear   URINEGLC Negative   URINEBILI Negative   URINEKETONE Negative   SG 1.014   UBLD Negative   URINEPH 5.5   PROTEIN 10*   NITRITE Negative   LEUKEST Moderate*   RBCU 6*   WBCU 23*   ,   Results for orders placed or performed during the hospital encounter of 02/28/19   US Lower Extremity Venous  Duplex Left    Narrative    VENOUS ULTRASOUND LEFT LEG  2/28/2019 6:05 PM     HISTORY: Left leg swelling 2+ edema. Left hip and leg pain greater  than 2 weeks.    COMPARISON: 2/20/2019    TECHNIQUE: Examination of the deep veins was completed with graded  compression and color flow Doppler with spectral wave form analysis.    FINDINGS: There is no evidence of thrombus in the common femoral,  femoral, or popliteal veins. There is no evidence of thrombus within  the calf veins.      Impression    IMPRESSION: No evidence of deep venous thrombosis within the left  lower extremity.    FEDERICO VAUGHN MD   CT Hip Left w/o Contrast    Narrative    CT LEFT HIP WITHOUT CONTRAST 2/28/2019 7:40 PM    Report: CT of the pelvis without contrast.    COMPARISON: X-rays 2/17/2019    TECHNIQUE: Contiguous computed tomography imaging of the pelvis was  performed without administration of intravenous contrast according to  the standard protocol. Coronal sagittal reformats were reconstructed  in bone and soft tissue algorithms.    HISTORY: Fall on Xarelto.    FINDINGS: Multiple intramuscular hematomas are present within the left  lower extremity involving the left gluteus musculature, left posterior  compartment/hamstring musculature at the inferior edge of the  field-of-view. Diffuse soft tissue swelling is present throughout the  visualized left upper extremity. Scattered extensive vascular  calcifications are present. Bilateral hip prostheses are present. No  fractures are identified. Small volume ascites is present throughout  the visualized abdomen and pelvis. Extensive sclerosis and  degenerative change are present throughout the visualized lumbar  spine. Brachytherapy prostatic seeds noted.      Impression    IMPRESSION: Multiple intramuscular hematomas throughout the left lower  extremity most conspicuously involving the gluteus musculature and  posterior compartment, incompletely characterized. Diffuse  edema/swelling  throughout the visualized left upper extremity.    FEDERICO VAUGHN MD   XR Lumbar Spine 2/3 Views    Narrative    LUMBAR SPINE TWO - THREE VIEWS  3/1/2019 1:32 PM     HISTORY: Midline lumbar pain status post fall.    COMPARISON: None.      Impression    IMPRESSION: Diffuse intervertebral disc space narrowing throughout the  lumbar spine, to the greatest degree at L5-S1. Suspect spinal canal  narrowing, though difficult to assess on radiographs. No obvious  compression deformity or acute fracture. Prosthetic radiation seeds  and IVC filter visible, as well as partial visualization of bilateral  total hip arthroplasties.    NICK LEBLANC MD   MR Lumbar Spine w/o Contrast    Narrative    MR LUMBAR SPINE WITHOUT CONTRAST  3/2/2019 1:58 PM     HISTORY: Back pain, less than 6 weeks, no red flags, no prior  management.    TECHNIQUE: Multiplanar multisequence images were obtained through the  lumbar spine without contrast.    COMPARISON: None.    FINDINGS: Five lumbar-type vertebral bodies are presumed. Posterior  alignment is normal. There is moderate to severe disc space narrowing  throughout the lumbosacral spine. Discogenic marrow changes are  present at L4-L5. Incidental note is made of focal hemangiomas in the  L2, L3, and L4 vertebral bodies. Bone marrow signal intensity is  otherwise normal. The conus medullaris and cauda equina nerve roots  are bunched at the L4-L5 level due to stenosis but otherwise appear to  be within normal limits.    L1-L2: Mild facet hypertrophy and broad-based disc bulging is present  causing mild central canal stenosis but no neural foraminal stenosis.    L2-L3: Broad-based disc bulge and posterior osteophyte formation facet  hypertrophy is present causing mild central canal stenosis but no  significant neural foraminal stenosis.    L3-L4: Broad-based posterior osteophyte formation, disc bulge and  facet hypertrophy is present causing mild to moderate central canal  stenosis but no  significant neural foraminal stenosis.    L4-L5: There is broad-based disc protrusion and severe facet and  ligamentum flavum hypertrophy causing severe central canal stenosis  and moderate bilateral neural foraminal stenosis.    L5-S1: Moderate facet hypertrophy and broad-based disc bulge or disc  protrusion is present causing some mild central and moderate to severe  bilateral neural foraminal stenosis.      Impression    IMPRESSION: Multilevel degenerative disc and facet disease most  advanced at L4-L5 where there is severe central canal stenosis and  moderate bilateral neural foraminal stenosis. There is also moderate  to severe bilateral neural foraminal stenosis at L5-S1.    ANSELMO MCNAMARA MD   XR Chest Port 1 View    Narrative    CHEST ONE VIEW PORTABLE   3/3/2019 9:40 AM     HISTORY:  Fever.    COMPARISON: 7/22/2018.      Impression    IMPRESSION: Small right pleural effusion with associated infiltrate  and/or atelectasis at the right lung base, new since the previous  exam. Right lung base pneumonia could have this appearance. The left  lung is clear. No pneumothorax. Prior midline sternotomy. Aortic  calcification. Heart size appears stable. Mild pulmonary venous  congestion has increased slightly.    KACI BAUMANN MD   US Paracentesis    Narrative    EXAM: US PARACENTESIS  3/11/2019 2:13 PM       HISTORY:Ascites       PROCEDURE:  Written informed consent was obtained from the patient  prior to the procedure. The risks and benefits including bleeding,  infection and abdominal organ injury were discussed and the patient  wished to continue. Initial ultrasound images demonstrated a large  right lower quadrant fluid collection. A permanent image was saved.  The skin overlying this collection was marked, prepped, draped and  anesthetized in usual sterile fashion utilizing 10 mL of lidocaine 1%.  The paracentesis catheter was then placed into the peritoneal fluid  collection with return of 3100 mL of dalia  colored fluid under  ultrasound guidance. Estimated blood loss during the procedure was  less than 5 mL. No specimens collected. Patient tolerated the  procedure well. The patient will receive intravenous albumin on the  usual sliding scale as needed per protocol.  Performed by Ran Cardenas  and Shirley Brody PA-C.      Impression    IMPRESSION:  Ultrasound-guided paracentesis.      RAN CARDENAS PA-C       Discharge Medications   Current Discharge Medication List      START taking these medications    Details   ceFAZolin (ANCEF) 1 GM vial Inject 2 g into the vein every 8 hours for 22 days ESR,CRP,CBC with differential, creatinine, SGOT weekly while on this medication to be faxed to Dr. Schofield office.  Qty: 1 each, Refills: 1    Associated Diagnoses: Gram-positive bacteremia      cyanocobalamin (VITAMIN B-12) 500 MCG SUBL sublingual tablet Place 2 tablets (1,000 mcg) under the tongue daily    Associated Diagnoses: Alcohol abuse      folic acid (FOLVITE) 1 MG tablet Take 1 tablet (1 mg) by mouth daily    Associated Diagnoses: Alcohol abuse      melatonin 3 MG tablet Take 1 tablet (3 mg) by mouth nightly as needed for sleep    Associated Diagnoses: Alcohol abuse      miconazole (MICATIN/MICRO GUARD) 2 % external powder Apply topically every hour as needed for other (topical candidiasis)    Associated Diagnoses: Alcoholic cirrhosis of liver with ascites (H)      multivitamin, therapeutic (THERA-VIT) TABS tablet Take 1 tablet by mouth daily    Associated Diagnoses: Alcohol abuse      oxyCODONE (ROXICODONE) 5 MG tablet Take 1 tablet (5 mg) by mouth every 6 hours as needed for moderate to severe pain  Qty: 10 tablet, Refills: 0    Associated Diagnoses: Status post total replacement of right hip      ranitidine (ZANTAC) 150 MG tablet Take 1 tablet (150 mg) by mouth daily    Associated Diagnoses: Alcohol abuse      vitamin B1 (THIAMINE) 100 MG tablet Take 1 tablet (100 mg) by mouth daily    Associated Diagnoses: Alcohol  "abuse         CONTINUE these medications which have CHANGED    Details   carvedilol (COREG) 3.125 MG tablet TAKE 1 TABLET(3.125 MG) BY MOUTH TWICE DAILY WITH MEALS  Qty: 180 tablet, Refills: 0    Associated Diagnoses: Ischemic cardiomyopathy; Pulmonary hypertension (H)      fluticasone (FLONASE) 50 MCG/ACT nasal spray Spray 1 spray into both nostrils daily as needed    Associated Diagnoses: Allergic rhinitis, unspecified seasonality, unspecified trigger      gabapentin (NEURONTIN) 600 MG tablet Take 1 tablet (600 mg) by mouth 2 times daily  Qty: 60 tablet, Refills: 11    Associated Diagnoses: Status post total replacement of right hip      ipratropium (ATROVENT) 0.06 % nasal spray Spray 2 sprays into both nostrils 4 times daily    Associated Diagnoses: Allergic rhinitis, unspecified seasonality, unspecified trigger      rivaroxaban ANTICOAGULANT (XARELTO) 20 MG TABS tablet Take 1 tablet (20 mg) by mouth daily (with dinner)    Associated Diagnoses: Paroxysmal atrial fibrillation (H)      torsemide (DEMADEX) 10 MG tablet Take 2 tablets (20 mg) by mouth 2 times daily  Qty: 30 tablet, Refills: 1    Associated Diagnoses: Pulmonary hypertension (H)         CONTINUE these medications which have NOT CHANGED    Details   Camphor-Menthol (MENTHOLATUM EX) Externally apply topically nightly as needed         STOP taking these medications       aspirin 81 MG tablet Comments:   Reason for Stopping:             Allergies   Allergies   Allergen Reactions     Ciprofloxacin Itching     Severe itching \"like ants were crawling\"     Hmg-Coa-R Inhibitors Other (See Comments)     Rhabdomyolysis occurred within a couple days     "

## 2019-03-14 NOTE — PLAN OF CARE
A/O x4. VSS on ra. Total care, refuses repo at times. Dribbles w some incontinence. Regular diet. No c/o pain overnight. Plans to discharge to [place] today.

## 2019-03-14 NOTE — PLAN OF CARE
Patient is A&O, VSS on RA, up with a lift, refuses to repositioned, voiding in urinal and sometimes incontinence, and  3-4+ edema from the legs down to his abdominal.  Lymphedema wrap on L BKA  and L side of abdomen is distended, erythema, and looks concerning. He denies pain, Ancef was infused. Will continue to monitor

## 2019-03-14 NOTE — PLAN OF CARE
Pt is A/O x4. Lift. Total care, t/r q2hr. Refuses repo at times. VSS on RA, denies pain. LS diminished. Edema +3/+4 to LE, hips, scrotum and abdomen. Lymphedema wrap reapplied to LLE, LBKA. Wound care done to wounds on patricia stumps per plan of care. L side of abdomen is more distended and erythema is marked. MD aware. Continues on IV Ancef. Midline in place, patent, good blood return, dressing changed. Voiding per urinal, dribbles at times. Discharged to [facility] at 1 pm.

## 2019-03-14 NOTE — PLAN OF CARE
PT: Patient discharged to TCU. PT goals partially met. Edema goal met, bed mobility goal met, transfer and gait goals not met.    Physical Therapy Discharge Summary    Reason for therapy discharge:    Discharged to transitional care facility.    Progress towards therapy goal(s). See goals on Care Plan in [facility] electronic health record for goal details.  Goals partially met.  Barriers to achieving goals:   discharge from facility.    Therapy recommendation(s):    Continued therapy is recommended.  Rationale/Recommendations:  eval and treat with PT at TCU.

## 2019-03-15 ENCOUNTER — NURSING HOME VISIT (OUTPATIENT)
Dept: GERIATRICS | Facility: CLINIC | Age: 74
End: 2019-03-15
Payer: MEDICARE

## 2019-03-15 ENCOUNTER — PATIENT OUTREACH (OUTPATIENT)
Dept: CARE COORDINATION | Facility: CLINIC | Age: 74
End: 2019-03-15

## 2019-03-15 VITALS
WEIGHT: 223 LBS | HEART RATE: 56 BPM | SYSTOLIC BLOOD PRESSURE: 117 MMHG | DIASTOLIC BLOOD PRESSURE: 70 MMHG | HEIGHT: 72 IN | BODY MASS INDEX: 30.2 KG/M2 | TEMPERATURE: 98.2 F | OXYGEN SATURATION: 95 % | RESPIRATION RATE: 16 BRPM

## 2019-03-15 DIAGNOSIS — Z86.711 HISTORY OF PULMONARY EMBOLISM: ICD-10-CM

## 2019-03-15 DIAGNOSIS — Z91.81 H/O FALL: ICD-10-CM

## 2019-03-15 DIAGNOSIS — K70.31 ALCOHOLIC CIRRHOSIS OF LIVER WITH ASCITES (H): ICD-10-CM

## 2019-03-15 DIAGNOSIS — I10 ESSENTIAL HYPERTENSION: ICD-10-CM

## 2019-03-15 DIAGNOSIS — B95.61 MSSA BACTEREMIA: Primary | ICD-10-CM

## 2019-03-15 DIAGNOSIS — D68.61 ANTIPHOSPHOLIPID ANTIBODY SYNDROME (H): ICD-10-CM

## 2019-03-15 DIAGNOSIS — Z96.641 STATUS POST TOTAL REPLACEMENT OF RIGHT HIP: ICD-10-CM

## 2019-03-15 DIAGNOSIS — I50.22 CHRONIC SYSTOLIC CONGESTIVE HEART FAILURE (H): ICD-10-CM

## 2019-03-15 DIAGNOSIS — D64.9 ANEMIA, UNSPECIFIED TYPE: ICD-10-CM

## 2019-03-15 DIAGNOSIS — Z86.718 H/O DEEP VENOUS THROMBOSIS: ICD-10-CM

## 2019-03-15 DIAGNOSIS — R78.81 MSSA BACTEREMIA: Primary | ICD-10-CM

## 2019-03-15 DIAGNOSIS — R79.1 SUPRATHERAPEUTIC INR: ICD-10-CM

## 2019-03-15 DIAGNOSIS — N18.30 CKD (CHRONIC KIDNEY DISEASE) STAGE 3, GFR 30-59 ML/MIN (H): ICD-10-CM

## 2019-03-15 DIAGNOSIS — I25.10 CORONARY ARTERY DISEASE INVOLVING NATIVE CORONARY ARTERY OF NATIVE HEART WITHOUT ANGINA PECTORIS: ICD-10-CM

## 2019-03-15 DIAGNOSIS — G54.6 PHANTOM LIMB PAIN (H): ICD-10-CM

## 2019-03-15 DIAGNOSIS — R53.81 DEBILITY: ICD-10-CM

## 2019-03-15 DIAGNOSIS — S80.12XD HEMATOMA OF LEFT LOWER EXTREMITY, SUBSEQUENT ENCOUNTER: ICD-10-CM

## 2019-03-15 DIAGNOSIS — F10.10 ALCOHOL ABUSE: ICD-10-CM

## 2019-03-15 DIAGNOSIS — Z89.512 HISTORY OF LEFT BELOW KNEE AMPUTATION (H): ICD-10-CM

## 2019-03-15 DIAGNOSIS — I48.0 PAROXYSMAL ATRIAL FIBRILLATION (H): ICD-10-CM

## 2019-03-15 PROCEDURE — 99310 SBSQ NF CARE HIGH MDM 45: CPT | Performed by: NURSE PRACTITIONER

## 2019-03-15 RX ORDER — OXYCODONE HYDROCHLORIDE 5 MG/1
5 TABLET ORAL EVERY 6 HOURS PRN
Qty: 60 TABLET | Refills: 0 | Status: SHIPPED | OUTPATIENT
Start: 2019-03-15 | End: 2019-03-23

## 2019-03-15 ASSESSMENT — MIFFLIN-ST. JEOR: SCORE: 1784.52

## 2019-03-15 ASSESSMENT — ACTIVITIES OF DAILY LIVING (ADL): DEPENDENT_IADLS:: CLEANING;COOKING;LAUNDRY;MEAL PREPARATION

## 2019-03-15 NOTE — LETTER
To:  Krista grubbs [facility]    Attn:   TREMAYNE Aaron, and/or other facility's         Patient s name:     Antonio Ramirez                                                                                Patient s :      1945                              Admission date:   2019       Dear ,      Please contact me when the patient is going to be discharged or move to long term care. If the patient is being discharged with Home Care services, please provide the name of the agency.    Please include the discharge date, disposition and main diagnosis, if you will.    If a care conference is going to be held, please let me know as well.    The information needed can be given by e-mail or a phone call.    Thank you!        Diane Chaidez RN Clinic Care Coordinator  [facility]  Phone: [phone]         e-mail: [email or web address]

## 2019-03-15 NOTE — PROGRESS NOTES
[place] [facility]  PRIMARY CARE PROVIDER AND CLINIC:  Main Hardy MD, [address] / [place] [address]  Chief Complaint   Patient presents with     Hospital F/U     [facility] Medical Record Number:  [id number]  Place of Service where encounter took place:  [facility] [[id number]]    Antonio Ramirez  is a 73 year old  (1945), admitted to the above facility from  [facility]. Hospital stay 2/28/19 through 3/14/19.  Admitted to this facility for  rehab, medical management and nursing care.    HPI:    HPI information obtained from: facility chart records, facility staff, patient report and [facility] chart review.     Mr. Ramirez is a 72 y/o whom has a complex past medical history and ongoing alcohol abuse whom fell at home and later went to hospital due to worsening LLE pain.  Was in setting of being found to have an INR of 7.45.  CT done showed multiple LLE hematoma's.  Later he developed MSSA bacteremia with unclear etiology, possible UTI vs. RLL pneumonia but unclear.  See by ID and they recommended ongoing IV Ancef for 22 days.  ESPERANZA ruled out vegetation.  He was seen by GI and underwent EGD that ruled out varices.  Initially anticoagulation and ASA was held but due to h/o antiphospholipid AB he was started on Xarelto before discharge.  Prior he insisted on getting a paracentesis which he reports he get's q3 months, thus was also done prior to discharge.  Due to improvement he transitioned to the TCU with ongoing cares and PT/OT.     In meeting Mr. Ramirez he is AOx3, reports ongoing LLE and buttocks pain but also endorses chronic ongoing Phantom limb pain.  He is requesting a speciality mattress.  Besides pain, he is endorsing ongoing significant LE edema/lymphaedema.  No other medical complaints.      CODE STATUS/ADVANCE DIRECTIVES DISCUSSION:   CPR/Full code   Patient's living condition: lives with spouse     ALLERGIES: Ciprofloxacin and  Hmg-coa-r inhibitors  PAST MEDICAL HISTORY:  has a past medical history of Alcoholism (H), Amputated left leg (H), Anemia, Anticardiolipin antibody syndrome (H), Antiphospholipid antibody syndrome (H), Antiplatelet or antithrombotic long-term use, Aortic root dilatation (H), Aortic stenosis, Arthritis, Balance problem, Coronary artery disease, Gastro-oesophageal reflux disease, Heart attack (H) (2007), Hiatal hernia, Hypercholesterolemia, Hypertension, Ischemic cardiomyopathy, Left foot drop, Liver disease, 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  PAST SURGICAL HISTORY:   has a past surgical history that includes appendectomy; Endoscopic stripping vein(s); Cardiac surgery; IR IVC Filter Placement; colonoscopy; Eye surgery; Cholecystectomy; esophagogastroduodenoscopy; orthopedic surgery; orthopedic surgery; orthopedic surgery; Arthroplasty knee (Right, 9/19/2014); hernia repair; Abdomen surgery; Cystoscopy, dilate urethra, combined (N/A, 10/12/2016); Amputate leg below knee (Left, 04/2014); Incision and drainage lower extremity, combined (Left, 12/1/2017); Amputate leg below knee (Left, 12/4/2017); Irrigation and debridement lower extremity, combined (Left, 12/6/2017); Apply Wound Vac (Left, 12/6/2017); Irrigation and debridement lower extremity, combined (Left, 12/8/2017); Craniotomy (Right, 4/7/2018); Laser holmium lithotripsy ureter(s), insert stent, combined (Bilateral, 6/28/2018); Combined cystoscopy, retrogrades, exchange stent ureter(s) (Left, 7/24/2018); Cystoscopy, remove stent(s), combined (Right, 7/24/2018); Laser holmium lithotripsy ureter(s), insert  stent, combined (Left, 8/14/2018); Arthroplasty hip (Right, 11/27/2018); and Esophagoscopy, gastroscopy, duodenoscopy (EGD), combined (N/A, 3/11/2019).  FAMILY HISTORY: family history includes Heart Failure in his father; Lung Cancer in his mother.  SOCIAL HISTORY:   reports that  has never smoked. he has never used smokeless tobacco. He reports that he drinks alcohol. He reports that he does not use drugs.    Post Discharge Medication Reconciliation Status: discharge medications reconciled and changed, per note/orders (see AVS)    Current Outpatient Medications   Medication Sig Dispense Refill     Camphor-Menthol (MENTHOLATUM EX) Externally apply topically nightly as needed       carvedilol (COREG) 3.125 MG tablet TAKE 1 TABLET(3.125 MG) BY MOUTH TWICE DAILY WITH MEALS 180 tablet 0     ceFAZolin (ANCEF) 1 GM vial Inject 2 g into the vein every 8 hours for 22 days ESR,CRP,CBC with differential, creatinine, SGOT weekly while on this medication to be faxed to Dr. Schofield office. 1 each 1     cyanocobalamin (VITAMIN B-12) 500 MCG SUBL sublingual tablet Place 2 tablets (1,000 mcg) under the tongue daily       fluticasone (FLONASE) 50 MCG/ACT nasal spray Spray 1 spray into both nostrils daily as needed       folic acid (FOLVITE) 1 MG tablet Take 1 tablet (1 mg) by mouth daily       gabapentin (NEURONTIN) 600 MG tablet Take 1 tablet (600 mg) by mouth 2 times daily 60 tablet 11     ipratropium (ATROVENT) 0.06 % nasal spray Spray 2 sprays into both nostrils 4 times daily       melatonin 3 MG tablet Take 1 tablet (3 mg) by mouth nightly as needed for sleep       miconazole (MICATIN/MICRO GUARD) 2 % external powder Apply topically every hour as needed for other (topical candidiasis)       multivitamin, therapeutic (THERA-VIT) TABS tablet Take 1 tablet by mouth daily       oxyCODONE (ROXICODONE) 5 MG tablet Take 1 tablet (5 mg) by mouth every 6 hours as needed for moderate to severe pain 60 tablet 0     ranitidine (ZANTAC) 150  MG tablet Take 1 tablet (150 mg) by mouth daily       rivaroxaban ANTICOAGULANT (XARELTO) 20 MG TABS tablet Take 1 tablet (20 mg) by mouth daily (with dinner)       torsemide (DEMADEX) 10 MG tablet Take 2 tablets (20 mg) by mouth 2 times daily 30 tablet 1     vitamin B1 (THIAMINE) 100 MG tablet Take 1 tablet (100 mg) by mouth daily         ROS:  7 point ROS done including, light headedness/dizziness, fever/chills, pain, Resp, CV, GI, and  and is negative other than noted in HPI.      Vitals:  /70   Pulse 56   Temp 98.2  F (36.8  C)   Resp 16   Ht 1.829 m (6')   Wt 101.2 kg (223 lb)   SpO2 95%   BMI 30.24 kg/m       Exam:  GENERAL APPEARANCE:  Elderly male resting in bed, NAD, non-toxic.  ENT:  Mouth and oropharynx normal, moist mucous membranes.   RESP:  Lungs diminished in bases, otherwise CTA.  Regular relaxed breathing effort.  No cough.   CV: 3/6 loud systolic murmur heard loudest at the 2 ICS LSB/S2.  Regular rhythm and rate.    ABDOMEN:   Obese and semi-firm, non-tender with active bowel sounds.  No guarding, rigidity, or rebound tenderness.  EXTREMITIES:  3-4+ edema/lymphaedema to hip level no calf tenderness.  L BKA with prosthetic in place.  No visible bruising but areas of induration on both inner thighs and LLQ of abdomen.    PSYCH: Alert and orientated, pleasant and cooperative.   LINES: RUE PICC in place, dressing intact, no overt s/s of infection.     Lab/Diagnostic data:  I have personally reviewed labs, which are in facility or EMR chart.     ASSESSMENT/PLAN:    MSSA bacteremia  As noted in EMR, source unclear, possible UTI vs RLL pneumonia.  Seen by ID, ESPERANZA r/o vegetation.    -Continues Ancef 2 g q8h x 22 days.   -Weekly labs ESR/CRP/CBC w diff/BMP/AST needed/ordered.   -Has RUE PICC in place.     H/O fall, recent  Hematoma of left lower extremity, subsequent encounter  Supratherapeutic INR  Antiphospholipid antibody syndrome (H)  H/O deep venous thrombosis  History of pulmonary  embolism  INR was 7+ in the ER but not on Warfarin, thus suspect from his EtOH cirrhosis.  Was on Xarelto and ASA in the past. Both held for hospital and procedures.  They continue to hold ASA, was resumed on Xarelto.  INR was 1.83 when last checked 3/10.  Hgb stable at 8.2.   -Continues on Xarelto.   -Will recheck INR 18 March.   --ASA remains on hold.   --RN manager ordered speciality mattress.     Coronary artery disease involving native coronary artery of native heart without angina pectoris h/o CABG  Essential hypertension  Chronic systolic congestive heart failure (H)  Paroxysmal atrial fibrillation (H)  Review of VS's show VSS and within acceptable range.   Has ongoing chronic 3-4+ LE lymphaedema, but no other s/s of clinical CHF.   -Continues OAC as noted above.   -Continues Torsemide, Coreg.   --Note ongoing hypokalemia, will supplement over weekend.   --Recheck K/Mg on 18 March.     CKD (chronic kidney disease) stage 3, GFR 30-59 ml/min (H)  Creatine baseline around 1.1 and was baseline when last checked 14 March.     Anemia, unspecified type  Hgb noted above, stable.     Alcohol abuse  Alcoholic cirrhosis of liver with ascites (H)  S/p EGD in hospital did not show varices, s/p paracentesis as well, which he reports he get's q3 months.   EMR notes 1/3 L of Vodka daily, no withdrawal issues noted in DC summary.   -Continues Folic acid, MVT, Thiamine.   -SW to present EtOH treatment information.     History of left below knee amputation (H)  Phantom limb pain (H)  -Continues PTA PRN Oxycodone and PRN Gabapentin.   -Would avoid all Acetaminophen and NSAIDS products.     Debility  -PT/OT to eval and treat debility.   -PT/OT to eval and treat lymphedema.     Orders written by provider at facility  -As noted above.     Total time spent with patient visit at the skilled nursing facility was 45 min including patient visit and review of past records. Greater than 50% of total time spent with counseling and  coordinating care due to admission/establish new care, complex medical case, review of HPI, development of POC, patient education and review of POC with pt.        Electronically signed by:  DIPAK Tariq CNP

## 2019-03-15 NOTE — LETTER
3/15/2019        RE: Antonio Ramirez  [address]  [place] [address]        [place] [facility]  PRIMARY CARE PROVIDER AND CLINIC:  Main Hardy MD, [address] / [place] [address]  Chief Complaint   Patient presents with     Hospital F/U     [facility] Medical Record Number:  [id number]  Place of Service where encounter took place:  [facility] ([facility]) [[id number]]    Antonio Ramirez  is a 73 year old  (1945), admitted to the above facility from  [facility]. Hospital stay 2/28/19 through 3/14/19.  Admitted to this facility for  rehab, medical management and nursing care.    HPI:    HPI information obtained from: facility chart records, facility staff, patient report and [facility] chart review.     Mr. Ramirez is a 74 y/o whom has a complex past medical history and ongoing alcohol abuse whom fell at home and later went to hospital due to worsening LLE pain.  Was in setting of being found to have an INR of 7.45.  CT done showed multiple LLE hematoma's.  Later he developed MSSA bacteremia with unclear etiology, possible UTI vs. RLL pneumonia but unclear.  See by ID and they recommended ongoing IV Ancef for 22 days.  ESPERANZA ruled out vegetation.  He was seen by GI and underwent EGD that ruled out varices.  Initially anticoagulation and ASA was held but due to h/o antiphospholipid AB he was started on Xarelto before discharge.  Prior he insisted on getting a paracentesis which he reports he get's q3 months, thus was also done prior to discharge.  Due to improvement he transitioned to the TCU with ongoing cares and PT/OT.     In meeting Mr. Ramirez he is AOx3, reports ongoing LLE and buttocks pain but also endorses chronic ongoing Phantom limb pain.  He is requesting a speciality mattress.  Besides pain, he is endorsing ongoing significant LE edema/lymphaedema.  No other medical complaints.      CODE STATUS/ADVANCE DIRECTIVES DISCUSSION:   CPR/Full  code   Patient's living condition: lives with spouse     ALLERGIES: Ciprofloxacin and Hmg-coa-r inhibitors  PAST MEDICAL HISTORY:  has a past medical history of Alcoholism (H), Amputated left leg (H), Anemia, Anticardiolipin antibody syndrome (H), Antiphospholipid antibody syndrome (H), Antiplatelet or antithrombotic long-term use, Aortic root dilatation (H), Aortic stenosis, Arthritis, Balance problem, Coronary artery disease, Gastro-oesophageal reflux disease, Heart attack (H) (2007), Hiatal hernia, Hypercholesterolemia, Hypertension, Ischemic cardiomyopathy, Left foot drop, Liver disease, 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  PAST SURGICAL HISTORY:   has a past surgical history that includes appendectomy; Endoscopic stripping vein(s); Cardiac surgery; IR IVC Filter Placement; colonoscopy; Eye surgery; Cholecystectomy; esophagogastroduodenoscopy; orthopedic surgery; orthopedic surgery; orthopedic surgery; Arthroplasty knee (Right, 9/19/2014); hernia repair; Abdomen surgery; Cystoscopy, dilate urethra, combined (N/A, 10/12/2016); Amputate leg below knee (Left, 04/2014); Incision and drainage lower extremity, combined (Left, 12/1/2017); Amputate leg below knee (Left, 12/4/2017); Irrigation and debridement lower extremity, combined (Left, 12/6/2017); Apply Wound Vac (Left, 12/6/2017); Irrigation and debridement lower extremity, combined (Left, 12/8/2017); Craniotomy (Right, 4/7/2018); Laser holmium lithotripsy ureter(s), insert stent, combined (Bilateral, 6/28/2018); Combined cystoscopy, retrogrades, exchange stent ureter(s) (Left, 7/24/2018); Cystoscopy, remove  stent(s), combined (Right, 7/24/2018); Laser holmium lithotripsy ureter(s), insert stent, combined (Left, 8/14/2018); Arthroplasty hip (Right, 11/27/2018); and Esophagoscopy, gastroscopy, duodenoscopy (EGD), combined (N/A, 3/11/2019).  FAMILY HISTORY: family history includes Heart Failure in his father; Lung Cancer in his mother.  SOCIAL HISTORY:   reports that  has never smoked. he has never used smokeless tobacco. He reports that he drinks alcohol. He reports that he does not use drugs.    Post Discharge Medication Reconciliation Status: discharge medications reconciled and changed, per note/orders (see AVS)    Current Outpatient Medications   Medication Sig Dispense Refill     Camphor-Menthol (MENTHOLATUM EX) Externally apply topically nightly as needed       carvedilol (COREG) 3.125 MG tablet TAKE 1 TABLET(3.125 MG) BY MOUTH TWICE DAILY WITH MEALS 180 tablet 0     ceFAZolin (ANCEF) 1 GM vial Inject 2 g into the vein every 8 hours for 22 days ESR,CRP,CBC with differential, creatinine, SGOT weekly while on this medication to be faxed to Dr. Schofield office. 1 each 1     cyanocobalamin (VITAMIN B-12) 500 MCG SUBL sublingual tablet Place 2 tablets (1,000 mcg) under the tongue daily       fluticasone (FLONASE) 50 MCG/ACT nasal spray Spray 1 spray into both nostrils daily as needed       folic acid (FOLVITE) 1 MG tablet Take 1 tablet (1 mg) by mouth daily       gabapentin (NEURONTIN) 600 MG tablet Take 1 tablet (600 mg) by mouth 2 times daily 60 tablet 11     ipratropium (ATROVENT) 0.06 % nasal spray Spray 2 sprays into both nostrils 4 times daily       melatonin 3 MG tablet Take 1 tablet (3 mg) by mouth nightly as needed for sleep       miconazole (MICATIN/MICRO GUARD) 2 % external powder Apply topically every hour as needed for other (topical candidiasis)       multivitamin, therapeutic (THERA-VIT) TABS tablet Take 1 tablet by mouth daily       oxyCODONE (ROXICODONE) 5 MG tablet Take 1 tablet (5 mg) by mouth every 6  hours as needed for moderate to severe pain 60 tablet 0     ranitidine (ZANTAC) 150 MG tablet Take 1 tablet (150 mg) by mouth daily       rivaroxaban ANTICOAGULANT (XARELTO) 20 MG TABS tablet Take 1 tablet (20 mg) by mouth daily (with dinner)       torsemide (DEMADEX) 10 MG tablet Take 2 tablets (20 mg) by mouth 2 times daily 30 tablet 1     vitamin B1 (THIAMINE) 100 MG tablet Take 1 tablet (100 mg) by mouth daily         ROS:  7 point ROS done including, light headedness/dizziness, fever/chills, pain, Resp, CV, GI, and  and is negative other than noted in HPI.      Vitals:  /70   Pulse 56   Temp 98.2  F (36.8  C)   Resp 16   Ht 1.829 m (6')   Wt 101.2 kg (223 lb)   SpO2 95%   BMI 30.24 kg/m        Exam:  GENERAL APPEARANCE:  Elderly male resting in bed, NAD, non-toxic.  ENT:  Mouth and oropharynx normal, moist mucous membranes.   RESP:  Lungs diminished in bases, otherwise CTA.  Regular relaxed breathing effort.  No cough.   CV: 3/6 loud systolic murmur heard loudest at the 2 ICS LSB/S2.  Regular rhythm and rate.    ABDOMEN:   Obese and semi-firm, non-tender with active bowel sounds.  No guarding, rigidity, or rebound tenderness.  EXTREMITIES:  3-4+ edema/lymphaedema to hip level no calf tenderness.  L BKA with prosthetic in place.  No visible bruising but areas of induration on both inner thighs and LLQ of abdomen.    PSYCH: Alert and orientated, pleasant and cooperative.   LINES: RUE PICC in place, dressing intact, no overt s/s of infection.     Lab/Diagnostic data:  I have personally reviewed labs, which are in facility or EMR chart.     ASSESSMENT/PLAN:    MSSA bacteremia  As noted in EMR, source unclear, possible UTI vs RLL pneumonia.  Seen by ID, ESPERANZA r/o vegetation.    -Continues Ancef 2 g q8h x 22 days.   -Weekly labs ESR/CRP/CBC w diff/BMP/AST needed/ordered.   -Has RUE PICC in place.     H/O fall, recent  Hematoma of left lower extremity, subsequent encounter  Supratherapeutic  INR  Antiphospholipid antibody syndrome (H)  H/O deep venous thrombosis  History of pulmonary embolism  INR was 7+ in the ER but not on Warfarin, thus suspect from his EtOH cirrhosis.  Was on Xarelto and ASA in the past. Both held for hospital and procedures.  They continue to hold ASA, was resumed on Xarelto.  INR was 1.83 when last checked 3/10.  Hgb stable at 8.2.   -Continues on Xarelto.   -Will recheck INR 18 March.   --ASA remains on hold.   --RN manager ordered speciality mattress.     Coronary artery disease involving native coronary artery of native heart without angina pectoris h/o CABG  Essential hypertension  Chronic systolic congestive heart failure (H)  Paroxysmal atrial fibrillation (H)  Review of VS's show VSS and within acceptable range.   Has ongoing chronic 3-4+ LE lymphaedema, but no other s/s of clinical CHF.   -Continues OAC as noted above.   -Continues Torsemide, Coreg.   --Note ongoing hypokalemia, will supplement over weekend.   --Recheck K/Mg on 18 March.     CKD (chronic kidney disease) stage 3, GFR 30-59 ml/min (H)  Creatine baseline around 1.1 and was baseline when last checked 14 March.     Anemia, unspecified type  Hgb noted above, stable.     Alcohol abuse  Alcoholic cirrhosis of liver with ascites (H)  S/p EGD in hospital did not show varices, s/p paracentesis as well, which he reports he get's q3 months.   EMR notes 1/3 L of Vodka daily, no withdrawal issues noted in DC summary.   -Continues Folic acid, MVT, Thiamine.   -SW to present EtOH treatment information.     History of left below knee amputation (H)  Phantom limb pain (H)  -Continues PTA PRN Oxycodone and PRN Gabapentin.   -Would avoid all Acetaminophen and NSAIDS products.     Debility  -PT/OT to eval and treat debility.   -PT/OT to eval and treat lymphedema.     Orders written by provider at facility  -As noted above.     Total time spent with patient visit at the skilled nursing facility was 45 min including patient visit  and review of past records. Greater than 50% of total time spent with counseling and coordinating care due to admission/establish new care, complex medical case, review of HPI, development of POC, patient education and review of POC with pt.        Electronically signed by:  DIPAK Tariq CNP                       Sincerely,        DIPAK Tariq CNP

## 2019-03-15 NOTE — PROGRESS NOTES
Clinic Care Coordination Contact  Care Coordination Transition Communication    Referral Source: IP Report    Clinical Data: Patient was hospitalized at [facility] from 2/28/19 to 3/14/19 with diagnosis of methicillin-sensitive Staphylococcus aureus bacteremia; left leg hematoma; mechanical fall; alcoholic cirrhosis with ascites; portal hypertension; anasarca; chronic systolic CHF.     Transition to Facility:  Fac[facility] Name: [facility]  Contact name and phone number/fax: TREMAYNE Aaron, [phone]    Plan: RN Care Coordinator will await notification from facility's staff informing of patient's discharge plans/needs. RN Care Coordinator will review chart and outreach to facility's staff every 4 weeks and/or as needed.     Diane Chaidez RN Care Coordinator  [facility]  Phone:  [phone] (Mondays, Wednesdays & Fridays)  Phone:  [phone] (Tuesdays & Thursdays)  Email: [email or web address]

## 2019-03-18 VITALS
WEIGHT: 223 LBS | HEART RATE: 63 BPM | DIASTOLIC BLOOD PRESSURE: 63 MMHG | RESPIRATION RATE: 18 BRPM | SYSTOLIC BLOOD PRESSURE: 100 MMHG | TEMPERATURE: 98.6 F | HEIGHT: 72 IN | OXYGEN SATURATION: 93 % | BODY MASS INDEX: 30.2 KG/M2

## 2019-03-18 ASSESSMENT — MIFFLIN-ST. JEOR: SCORE: 1784.52

## 2019-03-19 ENCOUNTER — NURSING HOME VISIT (OUTPATIENT)
Dept: GERIATRICS | Facility: CLINIC | Age: 74
End: 2019-03-19
Payer: MEDICARE

## 2019-03-19 ENCOUNTER — HOSPITAL LABORATORY (OUTPATIENT)
Dept: OTHER | Facility: CLINIC | Age: 74
End: 2019-03-19

## 2019-03-19 DIAGNOSIS — K70.31 ALCOHOLIC CIRRHOSIS OF LIVER WITH ASCITES (H): ICD-10-CM

## 2019-03-19 DIAGNOSIS — Z91.81 H/O FALL: ICD-10-CM

## 2019-03-19 DIAGNOSIS — S80.12XD HEMATOMA OF LEFT LOWER EXTREMITY, SUBSEQUENT ENCOUNTER: ICD-10-CM

## 2019-03-19 DIAGNOSIS — D68.61 ANTIPHOSPHOLIPID ANTIBODY SYNDROME (H): ICD-10-CM

## 2019-03-19 DIAGNOSIS — R78.81 MSSA BACTEREMIA: Primary | ICD-10-CM

## 2019-03-19 DIAGNOSIS — I50.22 CHRONIC SYSTOLIC CONGESTIVE HEART FAILURE (H): ICD-10-CM

## 2019-03-19 DIAGNOSIS — R79.1 SUPRATHERAPEUTIC INR: ICD-10-CM

## 2019-03-19 DIAGNOSIS — G54.6 PHANTOM LIMB PAIN (H): ICD-10-CM

## 2019-03-19 DIAGNOSIS — I48.0 PAROXYSMAL ATRIAL FIBRILLATION (H): ICD-10-CM

## 2019-03-19 DIAGNOSIS — M62.81 GENERALIZED MUSCLE WEAKNESS: ICD-10-CM

## 2019-03-19 DIAGNOSIS — B95.61 MSSA BACTEREMIA: Primary | ICD-10-CM

## 2019-03-19 LAB
ANION GAP SERPL CALCULATED.3IONS-SCNC: 7 MMOL/L (ref 3–14)
AST SERPL W P-5'-P-CCNC: 31 U/L (ref 0–45)
BASOPHILS # BLD AUTO: 0.1 10E9/L (ref 0–0.2)
BASOPHILS NFR BLD AUTO: 2.1 %
BUN SERPL-MCNC: 25 MG/DL (ref 7–30)
CALCIUM SERPL-MCNC: 8.2 MG/DL (ref 8.5–10.1)
CHLORIDE SERPL-SCNC: 102 MMOL/L (ref 94–109)
CO2 SERPL-SCNC: 28 MMOL/L (ref 20–32)
CREAT SERPL-MCNC: 1.35 MG/DL (ref 0.66–1.25)
CRP SERPL-MCNC: 23.6 MG/L (ref 0–8)
DIFFERENTIAL METHOD BLD: ABNORMAL
EOSINOPHIL # BLD AUTO: 0.2 10E9/L (ref 0–0.7)
EOSINOPHIL NFR BLD AUTO: 3.9 %
ERYTHROCYTE [DISTWIDTH] IN BLOOD BY AUTOMATED COUNT: 15 % (ref 10–15)
ERYTHROCYTE [SEDIMENTATION RATE] IN BLOOD BY WESTERGREN METHOD: 35 MM/H (ref 0–20)
GFR SERPL CREATININE-BSD FRML MDRD: 51 ML/MIN/{1.73_M2}
GLUCOSE SERPL-MCNC: 76 MG/DL (ref 70–99)
HCT VFR BLD AUTO: 24.6 % (ref 40–53)
HGB BLD-MCNC: 7.9 G/DL (ref 13.3–17.7)
IMM GRANULOCYTES # BLD: 0 10E9/L (ref 0–0.4)
IMM GRANULOCYTES NFR BLD: 0 %
LYMPHOCYTES # BLD AUTO: 0.7 10E9/L (ref 0.8–5.3)
LYMPHOCYTES NFR BLD AUTO: 17.1 %
MAGNESIUM SERPL-MCNC: 1.8 MG/DL (ref 1.6–2.3)
MCH RBC QN AUTO: 31.1 PG (ref 26.5–33)
MCHC RBC AUTO-ENTMCNC: 32.1 G/DL (ref 31.5–36.5)
MCV RBC AUTO: 97 FL (ref 78–100)
MONOCYTES # BLD AUTO: 0.6 10E9/L (ref 0–1.3)
MONOCYTES NFR BLD AUTO: 16.3 %
NEUTROPHILS # BLD AUTO: 2.3 10E9/L (ref 1.6–8.3)
NEUTROPHILS NFR BLD AUTO: 60.6 %
NRBC # BLD AUTO: 0 10*3/UL
NRBC BLD AUTO-RTO: 0 /100
PLATELET # BLD AUTO: 121 10E9/L (ref 150–450)
POTASSIUM SERPL-SCNC: 3.7 MMOL/L (ref 3.4–5.3)
RBC # BLD AUTO: 2.54 10E12/L (ref 4.4–5.9)
SODIUM SERPL-SCNC: 137 MMOL/L (ref 133–144)
WBC # BLD AUTO: 3.8 10E9/L (ref 4–11)

## 2019-03-19 PROCEDURE — 99306 1ST NF CARE HIGH MDM 50: CPT | Performed by: INTERNAL MEDICINE

## 2019-03-19 NOTE — LETTER
3/19/2019        RE: Antonio Ramirez  [address]  [place] [address]        Antonio Ramirez is a 73 year old male seen March 19, 2019 at [facility] where he was admitted this week after [facility] hospitalization after a fall at home.   Pt presented to the ED with LLE pain and hematoma related to anticoagulation.    During the stay he was found to have MSSA bacteremia of unclear etiology.  He was seen by ID and recommended to receive 22 days of IV Ancef.  No valvular vegetation seen on ESPERANZA.     He is seen in his room, reports continued pain / weakness and is using WC for mobility.    PICC line infiltrated and pulled, now has a peripheral IV left forearm.     Pt had December 2018 hospitalization and TCU stay for left stump MSSA abscess.  He had I&D with BKA stump revision.   He made progress with therapies and discharged home.   While there he gradually became weaker, seen in ED twice in February for weakness and fall related pain.      Ongoing medical issues include PAF with anticoagulation, antiphospholipid Ab, CAD/CHF and alcoholic cirrhosis.     During hospitalization his Xarelto was held, but restarted prior to discharge.   EGD ruled out esophageal varices, and he underwent paracentesis on 3/11 with removal of 3 L of fluid.          Past Medical History:   Diagnosis Date     Alcoholism (H)      Amputated left leg (H)      Anemia      Anticardiolipin antibody syndrome (H)      Antiphospholipid antibody syndrome (H)      Antiplatelet or antithrombotic long-term use      Aortic root dilatation (H)      Aortic stenosis      Arthritis      Balance problem      Coronary artery disease     CABG 2007: SVG to LAD, SVG to diagonal, SVG to OM; cardiac cath 5/17/18: thrombectomy for restenosis of stents-sent for urgent CABG, cath 5/14/2007: GRECIA x2 to LAD, G[place]ia to diagonal     Gastro-oesophageal reflux disease      Heart attack (H) 2007    apparently arrested, cardiac X5     Hiatal hernia      Hypercholesterolemia       Hypertension      Ischemic cardiomyopathy      Left foot drop      Liver disease     alcoholic liver disease, alcoholic cirrohsosis, portal hypertension     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)     ?When diagnosed, patient not aware of this     Moderate mitral regurgitation      Monoclonal gammopathy      Neuropathy      Noninfectious ileitis     diverticulitis of colon     Nonrheumatic tricuspid valve regurgitation      Paroxysmal atrial fibrillation (H)      PE (pulmonary embolism) 2006    saddle emboli 2006     Prostate cancer (H) 2000    Treated with radiation (seed implants in 2000) -- no problems since     Pulmonary hypertension (H)      Renal disease     kidney stones     Syncope      Thrombocytopenia (H)      Urethral stricture      Venous thrombosis     IVC filter and lysis procedures 2007       Past Surgical History:   Procedure Laterality Date     AMPUTATE LEG BELOW KNEE Left 04/2014    related to gangrene, started as foot ulcer related to neuropathy     AMPUTATE LEG BELOW KNEE Left 12/4/2017    Procedure: AMPUTATE LEG BELOW KNEE;  Exploration of Left Leg Below Knee Amputation Stump with Ligation of Bleeders.;  Surgeon: Leandro Arreola MD;  Location:  OR     APPENDECTOMY       APPLY WOUND VAC Left 12/6/2017    Procedure: APPLY WOUND VAC;;  Surgeon: Saima Howard MD;  Location:  SD     ARTHROPLASTY HIP Right 11/27/2018    Procedure: RIGHT TOTAL HIP ARTHROPLASTY;  Surgeon: Saima Howard MD;  Location:  OR     ARTHROPLASTY KNEE Right 9/19/2014    Procedure: ARTHROPLASTY KNEE;  Surgeon: Saima Howard MD;  Location:  OR     CARDIAC SURGERY      CABG     CHOLECYSTECTOMY       COLONOSCOPY       COMBINED CYSTOSCOPY, RETROGRADES, EXCHANGE STENT URETER(S) Left 7/24/2018    Procedure: COMBINED CYSTOSCOPY, RETROGRADES, EXCHANGE STENT URETER(S);  CYSTOSCOPY, BILATERAL RETROGRADES, BILATERAL URETERAL STENT EXCHANGE ;  Surgeon: Matt Cervantes MD;  Location:   OR     CRANIOTOMY Right 4/7/2018    Procedure: CRANIOTOMY;  Right Craniotomy For Subdural Hematoma;  Surgeon: Jono Issa MD;  Location:  OR     CYSTOSCOPY, DILATE URETHRA, COMBINED N/A 10/12/2016    Procedure: COMBINED CYSTOSCOPY, DILATE URETHRA;  Surgeon: Dimitry Bello MD;  Location:  OR     CYSTOSCOPY, REMOVE STENT(S), COMBINED Right 7/24/2018    Procedure: COMBINED CYSTOSCOPY, REMOVE STENT(S);;  Surgeon: Jose Hunter MD;  Location:  OR     ENDOSCOPIC STRIPPING VEIN(S)       esophagogastroduodenoscopy       ESOPHAGOSCOPY, GASTROSCOPY, DUODENOSCOPY (EGD), COMBINED N/A 3/11/2019    Procedure: COMBINED ESOPHAGOSCOPY, GASTROSCOPY, DUODENOSCOPY (EGD), BIOPSY SINGLE OR MULTIPLE;  Surgeon: Katherin Boyd MD;  Location:  GI     EYE SURGERY      cataracts     GENITOURINARY SURGERY      Prostate Seen Implants     HERNIA REPAIR      Umbilical     INCISION AND DRAINAGE LOWER EXTREMITY, COMBINED Left 12/1/2017    Procedure: COMBINED INCISION AND DRAINAGE LOWER EXTREMITY;  INCISION AND DRAINAGE OF LEFT BELOW KNEE AMPUTATION ABSCESS, WOUND VAC PLACEMENT;  Surgeon: Saima Howard MD;  Location:  OR     IR IVC FILTER PLACEMENT       IRRIGATION AND DEBRIDEMENT LOWER EXTREMITY, COMBINED Left 12/6/2017    Procedure: COMBINED IRRIGATION AND DEBRIDEMENT LOWER EXTREMITY;  IRRIGATION AND DEBRIDEMENT OF LEFT BELOW KNEE AMPUTATION SITE WITH WOUND VAC REPLACEMENT;  Surgeon: Saima Howard MD;  Location:  [facility]     IRRIGATION AND DEBRIDEMENT LOWER EXTREMITY, COMBINED Left 12/8/2017    Procedure: COMBINED IRRIGATION AND DEBRIDEMENT LOWER EXTREMITY;  IRRIGATION AND DEBRIDEMENT OF LEFT BELOW THE KNEE AMPUTATION AND SHORTENING OF THE TIBIA WITH WOUND CLOSURE;  Surgeon: Saima Howard MD;  Location:  OR     LASER HOLMIUM LITHOTRIPSY URETER(S), INSERT STENT, COMBINED Bilateral 6/28/2018    Procedure: COMBINED CYSTOSCOPY, URETEROSCOPY, LASER HOLMIUM LITHOTRIPSY URETER(S), INSERT STENT;   CYSTOSCOPY, BILATERAL URETEROSCOPY,  HOLMIUM LASER LITHOTRIPSY, BILATERAL STENT PLACEMENT, STONE BASKETING;  Surgeon: Jose Hunter MD;  Location:  OR     LASER HOLMIUM LITHOTRIPSY URETER(S), INSERT STENT, COMBINED Left 2018    Procedure: COMBINED CYSTOSCOPY, URETEROSCOPY, LASER HOLMIUM LITHOTRIPSY URETER(S), INSERT STENT;  CYSTOSCOPY, LEFT URETEROSCOPY, LEFT LASER HOLMIUM LITHOTRIPSY URETER(S) REMOVAL BILATERAL STENTS;  Surgeon: Jose Hunter MD;  Location:  OR     ORTHOPEDIC SURGERY      (R) knee scope     ORTHOPEDIC SURGERY      total hip      ORTHOPEDIC SURGERY      elbow surgery       Family History   Problem Relation Age of Onset     Lung Cancer Mother      Heart Failure Father          age 87       Social History     Tobacco Use     Smoking status: Never Smoker     Smokeless tobacco: Never Used   Substance Use Topics     Alcohol use: Yes     Comment: quit 10/2015; now 3-4 Vodkas/night      SH:  Lives with his wife, apartment in [place].   Retired     Review Of Systems  Skin: negative   Eyes: negative   Ears/Nose/Throat: hearing loss  Respiratory: No shortness of breath, dyspnea on exertion, cough, or hemoptysis  Cardiovascular: dyspnea on exertion and lower extremity edema; has Lymphapres that he uses at home for chronic lymphedema.    Gastrointestinal: constipation  Genitourinary: incontinence, urgency     Musculoskeletal: as above.  Previously ambulatory with his prosthesis up to 300' with walker and SBA.    Neurologic: SLUMS     Psychiatric: excessive alcohol consumption  Hematologic/Lymphatic/Immunologic: anemia, coagulopathy  Endocrine: negative      GENERAL APPEARANCE: alert and no distress  /63   Pulse 63   Temp 98.6  F (37  C)   Resp 18   Ht 1.829 m (6')   Wt 101.2 kg (223 lb)   SpO2 93%   BMI 30.24 kg/m      HEENT: normocephalic, no lesion or abnormalities  NECK: no adenopathy, no asymmetry, masses, or scars and thyroid normal to palpation  RESP:  decreased BS, no rales or wheeze  CV: regular rate and rhythm, normal S1 S2, 3/6 LUIS   ABDOMEN:  soft, nontender, no HSM or masses and bowel sounds normal  MS: left BKA and wearing prosthesis, 2+ LE edema    No UE edema, peripheral IV left forearm with some bleeding around the site.     SKIN: mild macular papular rash, excoriated on RUE   NEURO: Normal strength and tone, sensory exam grossly normal; limited history.     PSYCH: affect anxious and irritable  LYMPHATICS: No cervical,  or supraclavicular nodes    Last Comprehensive Metabolic Panel:  Sodium   Date Value Ref Range Status   03/19/2019 137 133 - 144 mmol/L Final     Potassium   Date Value Ref Range Status   03/19/2019 3.7 3.4 - 5.3 mmol/L Final     Chloride   Date Value Ref Range Status   03/19/2019 102 94 - 109 mmol/L Final     Carbon Dioxide   Date Value Ref Range Status   03/19/2019 28 20 - 32 mmol/L Final     Anion Gap   Date Value Ref Range Status   03/19/2019 7 3 - 14 mmol/L Final     Glucose   Date Value Ref Range Status   03/19/2019 76 70 - 99 mg/dL Final     Urea Nitrogen   Date Value Ref Range Status   03/19/2019 25 7 - 30 mg/dL Final     Creatinine   Date Value Ref Range Status   03/19/2019 1.35 (H) 0.66 - 1.25 mg/dL Final     GFR Estimate   Date Value Ref Range Status   03/19/2019 51 (L) >60 mL/min/[1.73_m2] Final     Comment:     Non  GFR Calc  Starting 12/18/2018, serum creatinine based estimated GFR (eGFR) will be   calculated using the Chronic Kidney Disease Epidemiology Collaboration   (CKD-EPI) equation.       Calcium   Date Value Ref Range Status   03/19/2019 8.2 (L) 8.5 - 10.1 mg/dL Final     Lab Results   Component Value Date    AST 31 03/19/2019     Lab Results   Component Value Date    ALBUMIN 2.7 02/28/2019     Lab Results   Component Value Date    WBC 3.8 03/19/2019      HGB 7.9 03/19/2019      MCV 97 03/19/2019       03/19/2019     Left hip CT 2/28/19  IMPRESSION: Multiple intramuscular hematomas throughout  the left lower  extremity most conspicuously involving the gluteus musculature and posterior compartment, incompletely characterized. Diffuse  edema/swelling throughout the visualized left upper extremity.     ESPERANZA 3/13/19   Interpretation Summary  1. No evidence of valvular endocarditis.  2. Trileaflet aortic valve with moderate stenosis. Peak transaortic velocity 3.3 m/s, mean gradient 24 mmHg.  3. Moderate mitral regurgitation. Mechanism is likely functional. 2 jets. The regurgitant orifice area of the larger jet is 0.25 cm2,    regurgitant volume 35 ml.  4. Moderately decreased left ventricular systolic function. Estimated LVEF 35-40%.  5. Regional wall motion abnormalities as specified in recent transthoracic echocardiogram.  6. Bubble study is negative for flow across the atrial septum.       IMP/PLAN:  (R78.81) MSSA bacteremia    Comment: unclear source     Plan: Ancef IV x3 weeks, per ID.   Follow temps, will need to get PICC replaced per IR.       (Z91.81) H/O fall, recent  (S80.12XD) Hematoma of left lower extremity, subsequent encounter  (G54.6) Phantom limb pain (H)  Comment: LLE pain and swelling.     Plan: pain regimen includes gabapentin, prn oxycodone  Assist with transfers and mobility       (R79.1) Supratherapeutic INR  (D68.61) Antiphospholipid antibody syndrome (H)  Comment: bleeding around IV and INR >3 again   May be auto-anticoagulated secondary to liver disease.   Plan: hold rivaroxaban until bleeding has stopped; follow hgb       (K70.31) Alcoholic cirrhosis of liver with ascites (H)  Comment: continued to drink prior to admission, but no evidence of withdrawal to date.      S/p paracentesis 3/11/19   Plan: follow exam, symptoms.   He declined Psychiatric referral in the hospital.  Continue thiamine, MVI, folate.      (I50.9) Chronic congestive heart failure, unspecified congestive heart failure type (H)  (I89.0) Lymphedema  Comment: + volume overload   Plan:   Continue current dose of  torsemide and carvedilol, and follow BMP.      (I48.0) Paroxysmal a-fib (H)  Comment: controlled VR on carvedilol  Plan: anticoagulation as above.       (D62) Anemia due to blood loss, acute  Comment: and pancytopenia  Plan: follow CBC   He has been followed by Hem/Onc    (M62.81) Generalized muscle weakness  Comment: related to all above   Plan: PHYSICAL THERAPY / OCCUPATIONAL THERAPY for strengthening, transfers, ADLs, gait.     Discharge goal is return home with his wife.       Shelia Watson MD            Sincerely,        Shelia Watson MD

## 2019-03-19 NOTE — PROGRESS NOTES
Antonio Ramirez is a 73 year old male seen March 19, 2019 at [facility] where he was admitted this week after [facility] hospitalization after a fall at home.   Pt presented to the ED with LLE pain and hematoma related to anticoagulation.    During the stay he was found to have MSSA bacteremia of unclear etiology.  He was seen by ID and recommended to receive 22 days of IV Ancef.  No valvular vegetation seen on ESPERANZA.     He is seen in his room, reports continued pain / weakness and is using WC for mobility.    PICC line infiltrated and pulled, now has a peripheral IV left forearm.     Pt had December 2018 hospitalization and TCU stay for left stump MSSA abscess.  He had I&D with BKA stump revision.   He made progress with therapies and discharged home.   While there he gradually became weaker, seen in ED twice in February for weakness and fall related pain.      Ongoing medical issues include PAF with anticoagulation, antiphospholipid Ab, CAD/CHF and alcoholic cirrhosis.     During hospitalization his Xarelto was held, but restarted prior to discharge.   EGD ruled out esophageal varices, and he underwent paracentesis on 3/11 with removal of 3 L of fluid.          Past Medical History:   Diagnosis Date     Alcoholism (H)      Amputated left leg (H)      Anemia      Anticardiolipin antibody syndrome (H)      Antiphospholipid antibody syndrome (H)      Antiplatelet or antithrombotic long-term use      Aortic root dilatation (H)      Aortic stenosis      Arthritis      Balance problem      Coronary artery disease     CABG 2007: SVG to LAD, SVG to diagonal, SVG to OM; cardiac cath 5/17/18: thrombectomy for restenosis of stents-sent for urgent CABG, cath 5/14/2007: GRECIA x2 to LAD, [place] to diagonal     Gastro-oesophageal reflux disease      Heart attack (H) 2007    apparently arrested, cardiac X5     Hiatal hernia      Hypercholesterolemia      Hypertension      Ischemic cardiomyopathy      Left foot drop      Liver disease      alcoholic liver disease, alcoholic cirrohsosis, portal hypertension     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)     ?When diagnosed, patient not aware of this     Moderate mitral regurgitation      Monoclonal gammopathy      Neuropathy      Noninfectious ileitis     diverticulitis of colon     Nonrheumatic tricuspid valve regurgitation      Paroxysmal atrial fibrillation (H)      PE (pulmonary embolism) 2006    saddle emboli 2006     Prostate cancer (H) 2000    Treated with radiation (seed implants in 2000) -- no problems since     Pulmonary hypertension (H)      Renal disease     kidney stones     Syncope      Thrombocytopenia (H)      Urethral stricture      Venous thrombosis     IVC filter and lysis procedures 2007       Past Surgical History:   Procedure Laterality Date     AMPUTATE LEG BELOW KNEE Left 04/2014    related to gangrene, started as foot ulcer related to neuropathy     AMPUTATE LEG BELOW KNEE Left 12/4/2017    Procedure: AMPUTATE LEG BELOW KNEE;  Exploration of Left Leg Below Knee Amputation Stump with Ligation of Bleeders.;  Surgeon: Leandro Arreola MD;  Location:  OR     APPENDECTOMY       APPLY WOUND VAC Left 12/6/2017    Procedure: APPLY WOUND VAC;;  Surgeon: Saima Howard MD;  Location:  SD     ARTHROPLASTY HIP Right 11/27/2018    Procedure: RIGHT TOTAL HIP ARTHROPLASTY;  Surgeon: Saima Howard MD;  Location:  OR     ARTHROPLASTY KNEE Right 9/19/2014    Procedure: ARTHROPLASTY KNEE;  Surgeon: Saima Howard MD;  Location:  OR     CARDIAC SURGERY      CABG     CHOLECYSTECTOMY       COLONOSCOPY       COMBINED CYSTOSCOPY, RETROGRADES, EXCHANGE STENT URETER(S) Left 7/24/2018    Procedure: COMBINED CYSTOSCOPY, RETROGRADES, EXCHANGE STENT URETER(S);  CYSTOSCOPY, BILATERAL RETROGRADES, BILATERAL URETERAL STENT EXCHANGE ;  Surgeon: Matt Cervantes MD;  Location:  OR     CRANIOTOMY Right 4/7/2018    Procedure: CRANIOTOMY;  Right Craniotomy For Subdural  Hematoma;  Surgeon: Jono Issa MD;  Location:  OR     CYSTOSCOPY, DILATE URETHRA, COMBINED N/A 10/12/2016    Procedure: COMBINED CYSTOSCOPY, DILATE URETHRA;  Surgeon: Dimitry Bello MD;  Location:  OR     CYSTOSCOPY, REMOVE STENT(S), COMBINED Right 7/24/2018    Procedure: COMBINED CYSTOSCOPY, REMOVE STENT(S);;  Surgeon: Jose Hunter MD;  Location:  OR     ENDOSCOPIC STRIPPING VEIN(S)       esophagogastroduodenoscopy       ESOPHAGOSCOPY, GASTROSCOPY, DUODENOSCOPY (EGD), COMBINED N/A 3/11/2019    Procedure: COMBINED ESOPHAGOSCOPY, GASTROSCOPY, DUODENOSCOPY (EGD), BIOPSY SINGLE OR MULTIPLE;  Surgeon: Katherin Boyd MD;  Location:  GI     EYE SURGERY      cataracts     GENITOURINARY SURGERY      Prostate Seen Implants     HERNIA REPAIR      Umbilical     INCISION AND DRAINAGE LOWER EXTREMITY, COMBINED Left 12/1/2017    Procedure: COMBINED INCISION AND DRAINAGE LOWER EXTREMITY;  INCISION AND DRAINAGE OF LEFT BELOW KNEE AMPUTATION ABSCESS, WOUND VAC PLACEMENT;  Surgeon: Saima Howard MD;  Location:  OR     IR IVC FILTER PLACEMENT       IRRIGATION AND DEBRIDEMENT LOWER EXTREMITY, COMBINED Left 12/6/2017    Procedure: COMBINED IRRIGATION AND DEBRIDEMENT LOWER EXTREMITY;  IRRIGATION AND DEBRIDEMENT OF LEFT BELOW KNEE AMPUTATION SITE WITH WOUND VAC REPLACEMENT;  Surgeon: Saima Howard MD;  Location:  SD     IRRIGATION AND DEBRIDEMENT LOWER EXTREMITY, COMBINED Left 12/8/2017    Procedure: COMBINED IRRIGATION AND DEBRIDEMENT LOWER EXTREMITY;  IRRIGATION AND DEBRIDEMENT OF LEFT BELOW THE KNEE AMPUTATION AND SHORTENING OF THE TIBIA WITH WOUND CLOSURE;  Surgeon: Saima Howard MD;  Location:  OR     LASER HOLMIUM LITHOTRIPSY URETER(S), INSERT STENT, COMBINED Bilateral 6/28/2018    Procedure: COMBINED CYSTOSCOPY, URETEROSCOPY, LASER HOLMIUM LITHOTRIPSY URETER(S), INSERT STENT;  CYSTOSCOPY, BILATERAL URETEROSCOPY,  HOLMIUM LASER LITHOTRIPSY, BILATERAL STENT PLACEMENT,  STONE BASKETING;  Surgeon: Jose Hunter MD;  Location:  OR     LASER HOLMIUM LITHOTRIPSY URETER(S), INSERT STENT, COMBINED Left 2018    Procedure: COMBINED CYSTOSCOPY, URETEROSCOPY, LASER HOLMIUM LITHOTRIPSY URETER(S), INSERT STENT;  CYSTOSCOPY, LEFT URETEROSCOPY, LEFT LASER HOLMIUM LITHOTRIPSY URETER(S) REMOVAL BILATERAL STENTS;  Surgeon: Jose Hunter MD;  Location:  OR     ORTHOPEDIC SURGERY      (R) knee scope     ORTHOPEDIC SURGERY      total hip      ORTHOPEDIC SURGERY      elbow surgery       Family History   Problem Relation Age of Onset     Lung Cancer Mother      Heart Failure Father          age 87       Social History     Tobacco Use     Smoking status: Never Smoker     Smokeless tobacco: Never Used   Substance Use Topics     Alcohol use: Yes     Comment: quit 10/2015; now 3-4 Vodkas/night      SH:  Lives with his wife, apartment in [place].   Retired     Review Of Systems  Skin: negative   Eyes: negative   Ears/Nose/Throat: hearing loss  Respiratory: No shortness of breath, dyspnea on exertion, cough, or hemoptysis  Cardiovascular: dyspnea on exertion and lower extremity edema; has Lymphapres that he uses at home for chronic lymphedema.    Gastrointestinal: constipation  Genitourinary: incontinence, urgency     Musculoskeletal: as above.  Previously ambulatory with his prosthesis up to 300' with walker and SBA.    Neurologic: SLUMS     Psychiatric: excessive alcohol consumption  Hematologic/Lymphatic/Immunologic: anemia, coagulopathy  Endocrine: negative      GENERAL APPEARANCE: alert and no distress  /63   Pulse 63   Temp 98.6  F (37  C)   Resp 18   Ht 1.829 m (6')   Wt 101.2 kg (223 lb)   SpO2 93%   BMI 30.24 kg/m     HEENT: normocephalic, no lesion or abnormalities  NECK: no adenopathy, no asymmetry, masses, or scars and thyroid normal to palpation  RESP: decreased BS, no rales or wheeze  CV: regular rate and rhythm, normal S1 S2, 3/6 LUIS   ABDOMEN:   soft, nontender, no HSM or masses and bowel sounds normal  MS: left BKA and wearing prosthesis, 2+ LE edema    No UE edema, peripheral IV left forearm with some bleeding around the site.     SKIN: mild macular papular rash, excoriated on RUE   NEURO: Normal strength and tone, sensory exam grossly normal; limited history.     PSYCH: affect anxious and irritable  LYMPHATICS: No cervical,  or supraclavicular nodes    Last Comprehensive Metabolic Panel:  Sodium   Date Value Ref Range Status   03/19/2019 137 133 - 144 mmol/L Final     Potassium   Date Value Ref Range Status   03/19/2019 3.7 3.4 - 5.3 mmol/L Final     Chloride   Date Value Ref Range Status   03/19/2019 102 94 - 109 mmol/L Final     Carbon Dioxide   Date Value Ref Range Status   03/19/2019 28 20 - 32 mmol/L Final     Anion Gap   Date Value Ref Range Status   03/19/2019 7 3 - 14 mmol/L Final     Glucose   Date Value Ref Range Status   03/19/2019 76 70 - 99 mg/dL Final     Urea Nitrogen   Date Value Ref Range Status   03/19/2019 25 7 - 30 mg/dL Final     Creatinine   Date Value Ref Range Status   03/19/2019 1.35 (H) 0.66 - 1.25 mg/dL Final     GFR Estimate   Date Value Ref Range Status   03/19/2019 51 (L) >60 mL/min/[1.73_m2] Final     Comment:     Non  GFR Calc  Starting 12/18/2018, serum creatinine based estimated GFR (eGFR) will be   calculated using the Chronic Kidney Disease Epidemiology Collaboration   (CKD-EPI) equation.       Calcium   Date Value Ref Range Status   03/19/2019 8.2 (L) 8.5 - 10.1 mg/dL Final     Lab Results   Component Value Date    AST 31 03/19/2019     Lab Results   Component Value Date    ALBUMIN 2.7 02/28/2019     Lab Results   Component Value Date    WBC 3.8 03/19/2019      HGB 7.9 03/19/2019      MCV 97 03/19/2019       03/19/2019     Left hip CT 2/28/19  IMPRESSION: Multiple intramuscular hematomas throughout the left lower  extremity most conspicuously involving the gluteus musculature and posterior  compartment, incompletely characterized. Diffuse  edema/swelling throughout the visualized left upper extremity.     ESPERANZA 3/13/19   Interpretation Summary  1. No evidence of valvular endocarditis.  2. Trileaflet aortic valve with moderate stenosis. Peak transaortic velocity 3.3 m/s, mean gradient 24 mmHg.  3. Moderate mitral regurgitation. Mechanism is likely functional. 2 jets. The regurgitant orifice area of the larger jet is 0.25 cm2,    regurgitant volume 35 ml.  4. Moderately decreased left ventricular systolic function. Estimated LVEF 35-40%.  5. Regional wall motion abnormalities as specified in recent transthoracic echocardiogram.  6. Bubble study is negative for flow across the atrial septum.       IMP/PLAN:  (R78.81) MSSA bacteremia    Comment: unclear source     Plan: Ancef IV x3 weeks, per ID.   Follow temps, will need to get PICC replaced per IR.       (Z91.81) H/O fall, recent  (S80.12XD) Hematoma of left lower extremity, subsequent encounter  (G54.6) Phantom limb pain (H)  Comment: LLE pain and swelling.     Plan: pain regimen includes gabapentin, prn oxycodone  Assist with transfers and mobility       (R79.1) Supratherapeutic INR  (D68.61) Antiphospholipid antibody syndrome (H)  Comment: bleeding around IV and INR >3 again   May be auto-anticoagulated secondary to liver disease.   Plan: hold rivaroxaban until bleeding has stopped; follow hgb       (K70.31) Alcoholic cirrhosis of liver with ascites (H)  Comment: continued to drink prior to admission, but no evidence of withdrawal to date.      S/p paracentesis 3/11/19   Plan: follow exam, symptoms.   He declined Psychiatric referral in the hospital.  Continue thiamine, MVI, folate.      (I50.9) Chronic congestive heart failure, unspecified congestive heart failure type (H)  (I89.0) Lymphedema  Comment: + volume overload   Plan:   Continue current dose of torsemide and carvedilol, and follow BMP.      (I48.0) Paroxysmal a-fib (H)  Comment: controlled VR  on carvedilol  Plan: anticoagulation as above.       (D62) Anemia due to blood loss, acute  Comment: and pancytopenia  Plan: follow CBC   He has been followed by Hem/Onc    (M62.81) Generalized muscle weakness  Comment: related to all above   Plan: PHYSICAL THERAPY / OCCUPATIONAL THERAPY for strengthening, transfers, ADLs, gait.     Discharge goal is return home with his wife.       Shelia Watson MD

## 2019-03-20 ENCOUNTER — TELEPHONE (OUTPATIENT)
Dept: FAMILY MEDICINE | Facility: CLINIC | Age: 74
End: 2019-03-20

## 2019-03-20 NOTE — PLAN OF CARE
Problem: Patient Care Overview  Goal: Plan of Care/Patient Progress Review  Outcome: Therapy, progress toward functional goals as expected  SLP: pt seen for meal, trialed NDD3 diet, did well, do note occasional slight wet vocal quality, ?if related to swallowing/liquids, no outward sx aspiration.  Pt does not want modified diet, explained reason to slowly advance.  Recommend advance to NDD3 diet, pt to take small bites and sips, sit up for po.       97.7

## 2019-03-20 NOTE — TELEPHONE ENCOUNTER
Reason for Call:  Letter    Detailed comments: Patients wife dropped off a Letter requesting a Note from   To get reimbursed for their [employer] tickets      Phone Number Patient can be reached at: Home number on file [phone] (home)    Best Time: n/a    Can we leave a detailed message on this number? Not Applicable    Call taken on 3/20/2019 at 1:17 PM by Darshan Fuentes

## 2019-03-21 ENCOUNTER — MEDICAL CORRESPONDENCE (OUTPATIENT)
Dept: HEALTH INFORMATION MANAGEMENT | Facility: CLINIC | Age: 74
End: 2019-03-21

## 2019-03-21 ENCOUNTER — HOSPITAL LABORATORY (OUTPATIENT)
Dept: OTHER | Facility: CLINIC | Age: 74
End: 2019-03-21

## 2019-03-21 ENCOUNTER — NURSING HOME VISIT (OUTPATIENT)
Dept: GERIATRICS | Facility: CLINIC | Age: 74
End: 2019-03-21
Payer: MEDICARE

## 2019-03-21 VITALS
BODY MASS INDEX: 30.24 KG/M2 | RESPIRATION RATE: 18 BRPM | OXYGEN SATURATION: 94 % | SYSTOLIC BLOOD PRESSURE: 109 MMHG | WEIGHT: 223 LBS | DIASTOLIC BLOOD PRESSURE: 45 MMHG | TEMPERATURE: 98.4 F | HEART RATE: 62 BPM

## 2019-03-21 DIAGNOSIS — R53.81 PHYSICAL DECONDITIONING: ICD-10-CM

## 2019-03-21 DIAGNOSIS — I25.10 CORONARY ARTERY DISEASE INVOLVING NATIVE CORONARY ARTERY OF NATIVE HEART WITHOUT ANGINA PECTORIS: ICD-10-CM

## 2019-03-21 DIAGNOSIS — I25.5 ISCHEMIC CARDIOMYOPATHY: ICD-10-CM

## 2019-03-21 DIAGNOSIS — K70.31 ALCOHOLIC CIRRHOSIS OF LIVER WITH ASCITES (H): ICD-10-CM

## 2019-03-21 DIAGNOSIS — R78.81 MSSA BACTEREMIA: Primary | ICD-10-CM

## 2019-03-21 DIAGNOSIS — N18.30 CKD (CHRONIC KIDNEY DISEASE) STAGE 3, GFR 30-59 ML/MIN (H): ICD-10-CM

## 2019-03-21 DIAGNOSIS — I50.22 CHRONIC SYSTOLIC CONGESTIVE HEART FAILURE (H): ICD-10-CM

## 2019-03-21 DIAGNOSIS — B95.61 MSSA BACTEREMIA: Primary | ICD-10-CM

## 2019-03-21 DIAGNOSIS — I10 ESSENTIAL HYPERTENSION: ICD-10-CM

## 2019-03-21 DIAGNOSIS — I48.0 PAROXYSMAL ATRIAL FIBRILLATION (H): ICD-10-CM

## 2019-03-21 DIAGNOSIS — S80.12XD HEMATOMA OF LEFT LOWER EXTREMITY, SUBSEQUENT ENCOUNTER: ICD-10-CM

## 2019-03-21 LAB
HGB BLD-MCNC: 8 G/DL (ref 13.3–17.7)
INR PPP: 1.83 (ref 0.86–1.14)

## 2019-03-21 PROCEDURE — 99310 SBSQ NF CARE HIGH MDM 45: CPT | Performed by: NURSE PRACTITIONER

## 2019-03-21 RX ORDER — BENZOCAINE/MENTHOL 6 MG-10 MG
LOZENGE MUCOUS MEMBRANE 2 TIMES DAILY PRN
Status: ON HOLD | COMMUNITY
End: 2019-06-15

## 2019-03-21 NOTE — PROGRESS NOTES
[facility]  [facility] Record Number:  [id number]  Place of Service where encounter took place:  [facility] ([facility]) [[id number]]  Chief Complaint   Patient presents with     RECHECK       HPI:    Antonio Ramirez  is a 73 year old (1945), who is being seen today for an episodic care visit.  HPI information obtained from: facility chart records, facility staff, patient report and [facility] chart review.   He came to this facility 3/14/2019 for short term rehab and medical management following hospitalization with LLE pain and hematoma after a mechanical fall. Complex medical history including alcoholic liver cirrhosis, CAD, HTN, afib, CHF, antiphospholipid Ab, chronic anticoagulation with Xarelto, history of PE and DVT with IVC filter, history of MSSA abscess of left  stump in 12/2018.   INR was 7.45 in the ED and vitamin K was given. Hgb ws 6.3 and he was transfused. CT left hip showed muscular hematomas, no fracture. MRI lumbar spine with multiple level degenerative disc disease with severe stenosis.   He became febrile and was found to have MSSA bacteremia of unclear source. ID consulted and he discharged on IV Ancef for 22 days. ESPERANZA negative for valvular vegetation. He underwent paracentesis 3/11/2019. GI consulted and EGD was negative for varices. Xarelto was held and resumed at discharge.     Today's concern is:     MSSA bacteremia-he reports increased pain and edema of his LUE. No fall or trauma. PICC on that arm recently infiltrated and he has a peripheral IV on his right arm. IV nurse unable to replace PICC due to INR 3.7 and ongoing bleeding at previous PICC site and peripheral IV. Line placement by IR was recommended. Xarelto has been held since 3/19/2019 and he received vitamin K 1 mg yesterday. Less bleeding at IV site and INR is 1.83 today. Afebrile.   Chronic systolic congestive heart failure (H)-denies cough, shortness of breath or chest pain. Weight  stable at 223 lbs.  Ischemic cardiomyopathy  Paroxysmal atrial fibrillation (H)-HR: 63-80  Alcoholic cirrhosis of liver with ascites (H)  CKD (chronic kidney disease) stage 3, GFR 30-59 ml/min (H)  Hematoma of left lower extremity, subsequent encounter-reports pain is controlled with oxycodone and  slowly improving.   Coronary artery disease involving native coronary artery of native heart without angina pectoris  Essential hypertension-BPs: 109/45, 101/68, 118/68  Physical deconditioning-nonambulatory, able to stand for transfers. Requires max assist with cares.   SLUMS 21/30    Past Medical and Surgical History reviewed in Epic today.    MEDICATIONS:  Current Outpatient Medications   Medication Sig Dispense Refill     carvedilol (COREG) 3.125 MG tablet TAKE 1 TABLET(3.125 MG) BY MOUTH TWICE DAILY WITH MEALS 180 tablet 0     ceFAZolin (ANCEF) 1 GM vial Inject 2 g into the vein every 8 hours for 22 days ESR,CRP,CBC with differential, creatinine, SGOT weekly while on this medication to be faxed to Dr. Schofield office. 1 each 1     cyanocobalamin (VITAMIN B-12) 500 MCG SUBL sublingual tablet Place 2 tablets (1,000 mcg) under the tongue daily       fluticasone (FLONASE) 50 MCG/ACT nasal spray Spray 1 spray into both nostrils daily as needed       folic acid (FOLVITE) 1 MG tablet Take 1 tablet (1 mg) by mouth daily       gabapentin (NEURONTIN) 600 MG tablet Take 1 tablet (600 mg) by mouth 2 times daily 60 tablet 11     hydrocortisone (CORTAID) 1 % external cream Apply topically 2 times daily as needed       ipratropium (ATROVENT) 0.06 % nasal spray Spray 2 sprays into both nostrils 4 times daily       melatonin 3 MG tablet Take 1 tablet (3 mg) by mouth nightly as needed for sleep       miconazole (MICATIN/MICRO GUARD) 2 % external powder Apply topically every hour as needed for other (topical candidiasis)       multivitamin, therapeutic (THERA-VIT) TABS tablet Take 1 tablet by mouth daily       ranitidine (ZANTAC) 150 MG  tablet Take 1 tablet (150 mg) by mouth daily       rivaroxaban ANTICOAGULANT (XARELTO) 20 MG TABS tablet Take 1 tablet (20 mg) by mouth daily (with dinner) (Patient taking differently: Take 20 mg by mouth daily (with dinner) HOLD starting 3/19/2019)       torsemide (DEMADEX) 10 MG tablet Take 2 tablets (20 mg) by mouth 2 times daily 30 tablet 1     vitamin B1 (THIAMINE) 100 MG tablet Take 1 tablet (100 mg) by mouth daily       oxyCODONE (ROXICODONE) 5 MG tablet Take 1-2 tablets (5-10 mg) by mouth every 4 hours as needed for severe pain 50 tablet 0       REVIEW OF SYSTEMS:  10 point ROS of systems including Constitutional, Eyes, Respiratory, Cardiovascular, Gastroenterology, Genitourinary, Integumentary, Musculoskeletal, Psychiatric were all negative except for pertinent positives noted in my HPI.    Objective:  /45   Pulse 62   Temp 98.4  F (36.9  C)   Resp 18   Wt 101.2 kg (223 lb)   SpO2 94%   BMI 30.24 kg/m    Exam:  GENERAL APPEARANCE:  Alert, in no distress  ENT:  Mouth and posterior oropharynx normal, moist mucous membranes, normal hearing acuity  EYES:  EOM normal, conjunctiva and lids normal, PERRL  NECK:  No adenopathy,masses or thyromegaly  RESP:  respiratory effort and palpation of chest normal, no respiratory distress, diminished breath sounds bilaterally, no crackles or wheezes  CV:  Palpation and auscultation of heart done , regular rate and rhythm, 3/6 murmur, 2+ edema RLE with +pedal pulses  ABDOMEN:  soft, non-tender, no distension, no masses  M/S:   in bed. Wearing LLE prosthesis. 1+ edema of LUE with large area of ecchymosis, no warmth or erythema, full ROM. Good strength of extremities  SKIN:  peripheral IV right forearm intact, no bleeding. No rashes or open areas  PSYCH:  oriented X 3, memory impaired , irritable    Labs:   Lab Results   Component Value Date    WBC 3.8 03/19/2019     Lab Results   Component Value Date    RBC 2.54 03/19/2019     Lab Results   Component Value Date     HGB 8.0 03/21/2019     Lab Results   Component Value Date    HCT 24.6 03/19/2019     Lab Results   Component Value Date    MCV 97 03/19/2019     Lab Results   Component Value Date    MCH 31.1 03/19/2019     Lab Results   Component Value Date    MCHC 32.1 03/19/2019     Lab Results   Component Value Date    RDW 15.0 03/19/2019     Lab Results   Component Value Date     03/19/2019     Last Comprehensive Metabolic Panel:  Sodium   Date Value Ref Range Status   03/19/2019 137 133 - 144 mmol/L Final     Potassium   Date Value Ref Range Status   03/19/2019 3.7 3.4 - 5.3 mmol/L Final     Chloride   Date Value Ref Range Status   03/19/2019 102 94 - 109 mmol/L Final     Carbon Dioxide   Date Value Ref Range Status   03/19/2019 28 20 - 32 mmol/L Final     Anion Gap   Date Value Ref Range Status   03/19/2019 7 3 - 14 mmol/L Final     Glucose   Date Value Ref Range Status   03/19/2019 76 70 - 99 mg/dL Final     Urea Nitrogen   Date Value Ref Range Status   03/19/2019 25 7 - 30 mg/dL Final     Creatinine   Date Value Ref Range Status   03/19/2019 1.35 (H) 0.66 - 1.25 mg/dL Final     GFR Estimate   Date Value Ref Range Status   03/19/2019 51 (L) >60 mL/min/[1.73_m2] Final     Comment:     Non  GFR Calc  Starting 12/18/2018, serum creatinine based estimated GFR (eGFR) will be   calculated using the Chronic Kidney Disease Epidemiology Collaboration   (CKD-EPI) equation.       Calcium   Date Value Ref Range Status   03/19/2019 8.2 (L) 8.5 - 10.1 mg/dL Final       ASSESSMENT / PLAN:  (R78.81) MSSA bacteremia  (primary encounter diagnosis)  Comment: unclear source. Tolerating IV antibiotic without side effect. Doppler US of LUE obtained and is negative for DVT.   Plan: continue IV Ancef with stop date of 4/6/2019. Continue weekly labs with results to Dr Schofield.  INR is now <3.0 and bleeding has resolved. IV nurse to reassess for line placement tomorrow.     (I50.22) Chronic systolic congestive heart failure  (H)  (I25.5) Ischemic cardiomyopathy  Comment: chronic LE edema. Weight stable and no other signs of volume overload  Plan: continue torsemide, carvedilol. Daily weight.     (I48.0) Paroxysmal atrial fibrillation (H)  Comment: rate controlled. Xarelto on hold due to supratherapeutic INR and bleeding at IV site. INR improved today after vitamin K yesterday. Hgb stable.   Plan: continue carvedilol. Continue to hold Xarelto and monitor.     (K70.31) Alcoholic cirrhosis of liver with ascites (H)  Comment: chronic. No withdrawal symptoms while inpatient. He has declined chem dep counseling. Paracentesis 3/11/2019  Plan: continue thiamine, MVI, folate. Outpatient paracentesis as needed. Refer to onsite psychologist prn.     (N18.3) CKD (chronic kidney disease) stage 3, GFR 30-59 ml/min (H)  Comment: renal function at baseline  Plan: follow BMP. Avoid nephrotoxins.     (S80.12XD) Hematoma of left lower extremity, subsequent encounter  Comment: pain improving.   Plan: continue oxycodone. Continue gabapentin for chronic neuropathic pain.     (I25.10) Coronary artery disease involving native coronary artery of native heart without angina pectoris h/o CABG  Comment: no acute issues  Plan: continue carvedilol, torsemide. Cardiology follow up per usual routine.     (I10) Essential hypertension  Comment: mild hypotension, asymptomatic  Plan: continue carvedilol for rate control, torsemide. Monitor VS    (R53.81) Physical deconditioning  Comment: slow progress in therapies  Plan: continue PHYSICAL THERAPY/OT. Goal is to return home with his wife        Total time spent with patient visit at the skilled nursing facility was 42 mins including patient visit and review of past records. Greater than 50% of total time spent with counseling and coordinating care due to complexity of care  Electronically signed by:  DIPAK Kennedy CNP

## 2019-03-21 NOTE — LETTER
3/21/2019        RE: Antonio Ramirez  [address]  [place] [address]        [facility]  [facility] Record Number:  [id number]  [facility] of [facility] where encounter took place:  [facility] [[id number]]  Chief Complaint   Patient presents with     RECHECK       HPI:    Antonio Ramirez  is a 73 year old (1945), who is being seen today for an episodic care visit.  HPI information obtained from: facility chart records, facility staff, patient report and [facility] chart review.   He came to this facility 3/14/2019 for short term rehab and medical management following hospitalization with LLE pain and hematoma after a mechanical fall. Complex medical history including alcoholic liver cirrhosis, CAD, HTN, afib, CHF, antiphospholipid Ab, chronic anticoagulation with Xarelto, history of PE and DVT with IVC filter, history of MSSA abscess of left  stump in 12/2018.   INR was 7.45 in the ED and vitamin K was given. Hgb ws 6.3 and he was transfused. CT left hip showed muscular hematomas, no fracture. MRI lumbar spine with multiple level degenerative disc disease with severe stenosis.   He became febrile and was found to have MSSA bacteremia of unclear source. ID consulted and he discharged on IV Ancef for 22 days. ESPERANZA negative for valvular vegetation. He underwent paracentesis 3/11/2019. GI consulted and EGD was negative for varices. Xarelto was held and resumed at discharge.     Today's concern is:     MSSA bacteremia-he reports increased pain and edema of his LUE. No fall or trauma. PICC on that arm recently infiltrated and he has a peripheral IV on his right arm. IV nurse unable to replace PICC due to INR 3.7 and ongoing bleeding at previous PICC site and peripheral IV. Line placement by IR was recommended. Xarelto has been held since 3/19/2019 and he received vitamin K 1 mg yesterday. Less bleeding at IV site and INR is 1.83 today. Afebrile.   Chronic  systolic congestive heart failure (H)-denies cough, shortness of breath or chest pain. Weight stable at 223 lbs.  Ischemic cardiomyopathy  Paroxysmal atrial fibrillation (H)-HR: 63-80  Alcoholic cirrhosis of liver with ascites (H)  CKD (chronic kidney disease) stage 3, GFR 30-59 ml/min (H)  Hematoma of left lower extremity, subsequent encounter-reports pain is controlled with oxycodone and  slowly improving.   Coronary artery disease involving native coronary artery of native heart without angina pectoris  Essential hypertension-BPs: 109/45, 101/68, 118/68  Physical deconditioning-nonambulatory, able to stand for transfers. Requires max assist with cares.   SLUMS 21/30    Past Medical and Surgical History reviewed in Epic today.    MEDICATIONS:  Current Outpatient Medications   Medication Sig Dispense Refill     carvedilol (COREG) 3.125 MG tablet TAKE 1 TABLET(3.125 MG) BY MOUTH TWICE DAILY WITH MEALS 180 tablet 0     ceFAZolin (ANCEF) 1 GM vial Inject 2 g into the vein every 8 hours for 22 days ESR,CRP,CBC with differential, creatinine, SGOT weekly while on this medication to be faxed to Dr. Schofield office. 1 each 1     cyanocobalamin (VITAMIN B-12) 500 MCG SUBL sublingual tablet Place 2 tablets (1,000 mcg) under the tongue daily       fluticasone (FLONASE) 50 MCG/ACT nasal spray Spray 1 spray into both nostrils daily as needed       folic acid (FOLVITE) 1 MG tablet Take 1 tablet (1 mg) by mouth daily       gabapentin (NEURONTIN) 600 MG tablet Take 1 tablet (600 mg) by mouth 2 times daily 60 tablet 11     hydrocortisone (CORTAID) 1 % external cream Apply topically 2 times daily as needed       ipratropium (ATROVENT) 0.06 % nasal spray Spray 2 sprays into both nostrils 4 times daily       melatonin 3 MG tablet Take 1 tablet (3 mg) by mouth nightly as needed for sleep       miconazole (MICATIN/MICRO GUARD) 2 % external powder Apply topically every hour as needed for other (topical candidiasis)       multivitamin,  therapeutic (THERA-VIT) TABS tablet Take 1 tablet by mouth daily       ranitidine (ZANTAC) 150 MG tablet Take 1 tablet (150 mg) by mouth daily       rivaroxaban ANTICOAGULANT (XARELTO) 20 MG TABS tablet Take 1 tablet (20 mg) by mouth daily (with dinner) (Patient taking differently: Take 20 mg by mouth daily (with dinner) HOLD starting 3/19/2019)       torsemide (DEMADEX) 10 MG tablet Take 2 tablets (20 mg) by mouth 2 times daily 30 tablet 1     vitamin B1 (THIAMINE) 100 MG tablet Take 1 tablet (100 mg) by mouth daily       oxyCODONE (ROXICODONE) 5 MG tablet Take 1-2 tablets (5-10 mg) by mouth every 4 hours as needed for severe pain 50 tablet 0       REVIEW OF SYSTEMS:  10 point ROS of systems including Constitutional, Eyes, Respiratory, Cardiovascular, Gastroenterology, Genitourinary, Integumentary, Musculoskeletal, Psychiatric were all negative except for pertinent positives noted in my HPI.    Objective:  /45   Pulse 62   Temp 98.4  F (36.9  C)   Resp 18   Wt 101.2 kg (223 lb)   SpO2 94%   BMI 30.24 kg/m     Exam:  GENERAL APPEARANCE:  Alert, in no distress  ENT:  Mouth and posterior oropharynx normal, moist mucous membranes, normal hearing acuity  EYES:  EOM normal, conjunctiva and lids normal, PERRL  NECK:  No adenopathy,masses or thyromegaly  RESP:  respiratory effort and palpation of chest normal, no respiratory distress, diminished breath sounds bilaterally, no crackles or wheezes  CV:  Palpation and auscultation of heart done , regular rate and rhythm, 3/6 murmur, 2+ edema RLE with +pedal pulses  ABDOMEN:  soft, non-tender, no distension, no masses  M/S:   in bed. Wearing LLE prosthesis. 1+ edema of LUE with large area of ecchymosis, no warmth or erythema, full ROM. Good strength of extremities  SKIN:  peripheral IV right forearm intact, no bleeding. No rashes or open areas  PSYCH:  oriented X 3, memory impaired , irritable    Labs:   Lab Results   Component Value Date    WBC 3.8 03/19/2019      Lab Results   Component Value Date    RBC 2.54 03/19/2019     Lab Results   Component Value Date    HGB 8.0 03/21/2019     Lab Results   Component Value Date    HCT 24.6 03/19/2019     Lab Results   Component Value Date    MCV 97 03/19/2019     Lab Results   Component Value Date    MCH 31.1 03/19/2019     Lab Results   Component Value Date    MCHC 32.1 03/19/2019     Lab Results   Component Value Date    RDW 15.0 03/19/2019     Lab Results   Component Value Date     03/19/2019     Last Comprehensive Metabolic Panel:  Sodium   Date Value Ref Range Status   03/19/2019 137 133 - 144 mmol/L Final     Potassium   Date Value Ref Range Status   03/19/2019 3.7 3.4 - 5.3 mmol/L Final     Chloride   Date Value Ref Range Status   03/19/2019 102 94 - 109 mmol/L Final     Carbon Dioxide   Date Value Ref Range Status   03/19/2019 28 20 - 32 mmol/L Final     Anion Gap   Date Value Ref Range Status   03/19/2019 7 3 - 14 mmol/L Final     Glucose   Date Value Ref Range Status   03/19/2019 76 70 - 99 mg/dL Final     Urea Nitrogen   Date Value Ref Range Status   03/19/2019 25 7 - 30 mg/dL Final     Creatinine   Date Value Ref Range Status   03/19/2019 1.35 (H) 0.66 - 1.25 mg/dL Final     GFR Estimate   Date Value Ref Range Status   03/19/2019 51 (L) >60 mL/min/[1.73_m2] Final     Comment:     Non  GFR Calc  Starting 12/18/2018, serum creatinine based estimated GFR (eGFR) will be   calculated using the Chronic Kidney Disease Epidemiology Collaboration   (CKD-EPI) equation.       Calcium   Date Value Ref Range Status   03/19/2019 8.2 (L) 8.5 - 10.1 mg/dL Final       ASSESSMENT / PLAN:  (R78.81) MSSA bacteremia  (primary encounter diagnosis)  Comment: unclear source. Tolerating IV antibiotic without side effect. Doppler US of LUE obtained and is negative for DVT.   Plan: continue IV Ancef with stop date of 4/6/2019. Continue weekly labs with results to Dr Schofield.  INR is now <3.0 and bleeding has resolved. IV  nurse to reassess for line placement tomorrow.     (I50.22) Chronic systolic congestive heart failure (H)  (I25.5) Ischemic cardiomyopathy  Comment: chronic LE edema. Weight stable and no other signs of volume overload  Plan: continue torsemide, carvedilol. Daily weight.     (I48.0) Paroxysmal atrial fibrillation (H)  Comment: rate controlled. Xarelto on hold due to supratherapeutic INR and bleeding at IV site. INR improved today after vitamin K yesterday. Hgb stable.   Plan: continue carvedilol. Continue to hold Xarelto and monitor.     (K70.31) Alcoholic cirrhosis of liver with ascites (H)  Comment: chronic. No withdrawal symptoms while inpatient. He has declined chem dep counseling. Paracentesis 3/11/2019  Plan: continue thiamine, MVI, folate. Outpatient paracentesis as needed. Refer to onsite psychologist prn.     (N18.3) CKD (chronic kidney disease) stage 3, GFR 30-59 ml/min (H)  Comment: renal function at baseline  Plan: follow BMP. Avoid nephrotoxins.     (S80.12XD) Hematoma of left lower extremity, subsequent encounter  Comment: pain improving.   Plan: continue oxycodone. Continue gabapentin for chronic neuropathic pain.     (I25.10) Coronary artery disease involving native coronary artery of native heart without angina pectoris h/o CABG  Comment: no acute issues  Plan: continue carvedilol, torsemide. Cardiology follow up per usual routine.     (I10) Essential hypertension  Comment: mild hypotension, asymptomatic  Plan: continue carvedilol for rate control, torsemide. Monitor VS    (R53.81) Physical deconditioning  Comment: slow progress in therapies  Plan: continue PHYSICAL THERAPY/OT. Goal is to return home with his wife        Total time spent with patient visit at the [facility] nursing facility was 42 mins including patient visit and review of past records. Greater than 50% of total time spent with counseling and coordinating care due to complexity of care  Electronically signed by:  Jhon Biggs  APRN CNP             Sincerely,        DIPAK Kennedy CNP

## 2019-03-23 DIAGNOSIS — B95.61 MSSA BACTEREMIA: Primary | ICD-10-CM

## 2019-03-23 DIAGNOSIS — R78.81 MSSA BACTEREMIA: Primary | ICD-10-CM

## 2019-03-23 RX ORDER — OXYCODONE HYDROCHLORIDE 5 MG/1
5-10 TABLET ORAL EVERY 4 HOURS PRN
Qty: 50 TABLET | Refills: 0 | Status: SHIPPED | OUTPATIENT
Start: 2019-03-23 | End: 2019-03-24 | Stop reason: ALTCHOICE

## 2019-03-23 NOTE — PROGRESS NOTES
Nursing called to day to state that Antonio was having poor pain control.  Was able to give one dose early but not doing well.      Reviewed his history with nursing.  Decided to increase his oxycodone to 5-10mg every 4 hours prn with pain rating 1-5 = 1 tab and 6-10 = 2 tabs    Electronically signed by Viviane Lennon RN, CNP

## 2019-03-24 ENCOUNTER — TELEPHONE (OUTPATIENT)
Dept: GERIATRICS | Facility: CLINIC | Age: 74
End: 2019-03-24

## 2019-03-24 ENCOUNTER — NURSING HOME VISIT (OUTPATIENT)
Dept: GERIATRICS | Facility: CLINIC | Age: 74
End: 2019-03-24
Payer: MEDICARE

## 2019-03-24 VITALS
DIASTOLIC BLOOD PRESSURE: 67 MMHG | OXYGEN SATURATION: 95 % | HEART RATE: 67 BPM | WEIGHT: 223 LBS | RESPIRATION RATE: 16 BRPM | TEMPERATURE: 98.4 F | BODY MASS INDEX: 30.24 KG/M2 | SYSTOLIC BLOOD PRESSURE: 121 MMHG

## 2019-03-24 DIAGNOSIS — M79.622 PAIN OF LEFT UPPER ARM: Primary | ICD-10-CM

## 2019-03-24 DIAGNOSIS — M79.89 LEFT UPPER EXTREMITY SWELLING: ICD-10-CM

## 2019-03-24 PROCEDURE — 99308 SBSQ NF CARE LOW MDM 20: CPT | Performed by: NURSE PRACTITIONER

## 2019-03-24 RX ORDER — HYDROCODONE BITARTRATE AND ACETAMINOPHEN 5; 325 MG/1; MG/1
1 TABLET ORAL EVERY 6 HOURS PRN
Refills: 0 | Status: CANCELLED | OUTPATIENT
Start: 2019-03-24

## 2019-03-24 RX ORDER — HYDROCODONE BITARTRATE AND ACETAMINOPHEN 5; 325 MG/1; MG/1
1 TABLET ORAL 3 TIMES DAILY
Qty: 9 TABLET | Refills: 0 | Status: SHIPPED | OUTPATIENT
Start: 2019-03-24 | End: 2019-04-03

## 2019-03-24 RX ORDER — HYDROCODONE BITARTRATE AND ACETAMINOPHEN 5; 325 MG/1; MG/1
1 TABLET ORAL EVERY 6 HOURS PRN
Qty: 18 TABLET | Refills: 0 | Status: SHIPPED | OUTPATIENT
Start: 2019-03-24 | End: 2019-04-03

## 2019-03-24 NOTE — PROGRESS NOTES
"[facility]  [facility] Record Number:  [id number]  Place of Service where encounter took place:  No question data found.  Chief Complaint   Patient presents with     RECHECK       HPI:    Antonio Ramirez  is a 73 year old (1945), who is being seen today for an episodic care visit.  HPI information obtained from: facility chart records, facility staff, patient report and [facility] chart review. Today's concern is:     Pain of left upper arm  Left upper extremity swelling     Patient seen today for acute visit 2/2 c/o uncontrolled LUE pain - patient with presence of edema of LUE 2/2 prior PICC line infiltration - prior US negative for DVT. He does have PMH of Lymphedema, thus we discuss his body may take a bit longer than that of a \"normal lymph system\" to reabsorb the fluid associated with swelling - he is currently on Oxycodone noting minimal pain control; he states he has taken Norco in the past effectively. Denies CP or respiratory concerns today.     Past Medical and Surgical History reviewed in Epic today.    MEDICATIONS:  Current Outpatient Medications   Medication Sig Dispense Refill     carvedilol (COREG) 3.125 MG tablet TAKE 1 TABLET(3.125 MG) BY MOUTH TWICE DAILY WITH MEALS 180 tablet 0     ceFAZolin (ANCEF) 1 GM vial Inject 2 g into the vein every 8 hours for 22 days ESR,CRP,CBC with differential, creatinine, SGOT weekly while on this medication to be faxed to Dr. Schofield office. 1 each 1     cyanocobalamin (VITAMIN B-12) 500 MCG SUBL sublingual tablet Place 2 tablets (1,000 mcg) under the tongue daily       fluticasone (FLONASE) 50 MCG/ACT nasal spray Spray 1 spray into both nostrils daily as needed       folic acid (FOLVITE) 1 MG tablet Take 1 tablet (1 mg) by mouth daily       gabapentin (NEURONTIN) 600 MG tablet Take 1 tablet (600 mg) by mouth 2 times daily 60 tablet 11     hydrocortisone (CORTAID) 1 % external cream Apply topically 2 times daily as needed       " "ipratropium (ATROVENT) 0.06 % nasal spray Spray 2 sprays into both nostrils 4 times daily       melatonin 3 MG tablet Take 1 tablet (3 mg) by mouth nightly as needed for sleep       miconazole (MICATIN/MICRO GUARD) 2 % external powder Apply topically every hour as needed for other (topical candidiasis)       multivitamin, therapeutic (THERA-VIT) TABS tablet Take 1 tablet by mouth daily       oxyCODONE (ROXICODONE) 5 MG tablet Take 1-2 tablets (5-10 mg) by mouth every 4 hours as needed for severe pain 50 tablet 0     ranitidine (ZANTAC) 150 MG tablet Take 1 tablet (150 mg) by mouth daily       rivaroxaban ANTICOAGULANT (XARELTO) 20 MG TABS tablet Take 1 tablet (20 mg) by mouth daily (with dinner) (Patient taking differently: Take 20 mg by mouth daily (with dinner) HOLD starting 3/19/2019)       torsemide (DEMADEX) 10 MG tablet Take 2 tablets (20 mg) by mouth 2 times daily 30 tablet 1     vitamin B1 (THIAMINE) 100 MG tablet Take 1 tablet (100 mg) by mouth daily       REVIEW OF SYSTEMS:  4 point ROS including Respiratory, CV, GI and , other than that noted in the HPI,  is negative    Objective:  /67   Pulse 67   Temp 98.4  F (36.9  C)   Resp 16   Wt 101.2 kg (223 lb)   SpO2 95%   BMI 30.24 kg/m    Exam:  GENERAL APPEARANCE:  Alert, appears healthy, in physical distress due to pain, cooperative, lethargic 2/2 pain medications  RESP:  respiratory effort and palpation of chest normal, lungs clear to auscultation , no respiratory distress  CV:  Palpation and auscultation of heart done , regular rate and rhythm, no murmur, rub, or gallop, \"2+ pedal pulses in RLE\"   LYMPHATICS:  No adenopathy in neck , No adenopathy in axillae; 3+ edema present in LUE  PSYCH:  oriented X 3, normal insight, judgement and memory, affect abnormal - concerned; sleepy    Labs:   Recent labs in [facility] reviewed by me today.     ASSESSMENT/PLAN:  (C67.152) Pain of left upper arm  (primary encounter diagnosis)  Comment: 2/2 " swelling/infiltration as noted above - unrelieved on Oxycodone, this appears to simply make patient sleepy and not touch his pain - norco effective in past  Plan: discontinue Oxycodone; Start HYDROcodone-acetaminophen (NORCO) 5-325 MG tablet - 1tab PO q6h PRN pain    (M79.89) Left upper extremity swelling  Comment: 2/2 infiltration as above - hx of lymphedema; has been using ice sparingly - encouraged use of this.  Plan: Ice to LUE to help decrease swelling; Ace wrap to LUE - on in AM; off at HS    Electronically signed by:  DIPAK Wen, [facility]  Phone: [phone]  Fax: [phone]

## 2019-03-24 NOTE — LETTER
"    3/24/2019        RE: Antonio Ramirez  [address]  [place] [address]        [facility]  [facility] Record Number:  [id number]  Place of Service where encounter took place:  No question data found.  Chief Complaint   Patient presents with     RECHECK       HPI:    Antonio Ramirez  is a 73 year old (1945), who is being seen today for an episodic care visit.  HPI information obtained from: facility chart records, facility staff, patient report and [facility] chart review. Today's concern is:     Pain of left upper arm  Left upper extremity swelling     Patient seen today for acute visit 2/2 c/o uncontrolled LUE pain - patient with presence of edema of LUE 2/2 prior PICC line infiltration - prior US negative for DVT. He does have PMH of Lymphedema, thus we discuss his body may take a bit longer than that of a \"normal lymph system\" to reabsorb the fluid associated with swelling - he is currently on Oxycodone noting minimal pain control; he states he has taken Norco in the past effectively. Denies CP or respiratory concerns today.     Past Medical and Surgical History reviewed in Epic today.    MEDICATIONS:  Current Outpatient Medications   Medication Sig Dispense Refill     carvedilol (COREG) 3.125 MG tablet TAKE 1 TABLET(3.125 MG) BY MOUTH TWICE DAILY WITH MEALS 180 tablet 0     ceFAZolin (ANCEF) 1 GM vial Inject 2 g into the vein every 8 hours for 22 days ESR,CRP,CBC with differential, creatinine, SGOT weekly while on this medication to be faxed to Dr. Schofield office. 1 each 1     cyanocobalamin (VITAMIN B-12) 500 MCG SUBL sublingual tablet Place 2 tablets (1,000 mcg) under the tongue daily       fluticasone (FLONASE) 50 MCG/ACT nasal spray Spray 1 spray into both nostrils daily as needed       folic acid (FOLVITE) 1 MG tablet Take 1 tablet (1 mg) by mouth daily       gabapentin (NEURONTIN) 600 MG tablet Take 1 tablet (600 mg) by mouth 2 times daily 60 tablet 11     " "hydrocortisone (CORTAID) 1 % external cream Apply topically 2 times daily as needed       ipratropium (ATROVENT) 0.06 % nasal spray Spray 2 sprays into both nostrils 4 times daily       melatonin 3 MG tablet Take 1 tablet (3 mg) by mouth nightly as needed for sleep       miconazole (MICATIN/MICRO GUARD) 2 % external powder Apply topically every hour as needed for other (topical candidiasis)       multivitamin, therapeutic (THERA-VIT) TABS tablet Take 1 tablet by mouth daily       oxyCODONE (ROXICODONE) 5 MG tablet Take 1-2 tablets (5-10 mg) by mouth every 4 hours as needed for severe pain 50 tablet 0     ranitidine (ZANTAC) 150 MG tablet Take 1 tablet (150 mg) by mouth daily       rivaroxaban ANTICOAGULANT (XARELTO) 20 MG TABS tablet Take 1 tablet (20 mg) by mouth daily (with dinner) (Patient taking differently: Take 20 mg by mouth daily (with dinner) HOLD starting 3/19/2019)       torsemide (DEMADEX) 10 MG tablet Take 2 tablets (20 mg) by mouth 2 times daily 30 tablet 1     vitamin B1 (THIAMINE) 100 MG tablet Take 1 tablet (100 mg) by mouth daily       REVIEW OF SYSTEMS:  4 point ROS including Respiratory, CV, GI and , other than that noted in the HPI,  is negative    Objective:  /67   Pulse 67   Temp 98.4  F (36.9  C)   Resp 16   Wt 101.2 kg (223 lb)   SpO2 95%   BMI 30.24 kg/m     Exam:  GENERAL APPEARANCE:  Alert, appears healthy, in physical distress due to pain, cooperative, lethargic 2/2 pain medications  RESP:  respiratory effort and palpation of chest normal, lungs clear to auscultation , no respiratory distress  CV:  Palpation and auscultation of heart done , regular rate and rhythm, no murmur, rub, or gallop, \"2+ pedal pulses in RLE\"   LYMPHATICS:  No adenopathy in neck , No adenopathy in axillae; 3+ edema present in LUE  PSYCH:  oriented X 3, normal insight, judgement and memory, affect abnormal - concerned; sleepy    Labs:   Recent labs in [facility] reviewed by me today. "     ASSESSMENT/PLAN:  (M79.622) Pain of left upper arm  (primary encounter diagnosis)  Comment: 2/2 swelling/infiltration as noted above - unrelieved on Oxycodone, this appears to simply make patient sleepy and not touch his pain - norco effective in past  Plan: discontinue Oxycodone; Start HYDROcodone-acetaminophen (NORCO) 5-325 MG tablet - 1tab PO q6h PRN pain    (M79.89) Left upper extremity swelling  Comment: 2/2 infiltration as above - hx of lymphedema; has been using ice sparingly - encouraged use of this.  Plan: Ice to LUE to help decrease swelling; Ace wrap to LUE - on in AM; off at HS    Electronically signed by:  DIPAK Wen, [facility]  Phone: [phone]  Fax: [phone]              Sincerely,        DIPAK Salguero CNP

## 2019-03-24 NOTE — TELEPHONE ENCOUNTER
Nursing called to update on Antonio today since increasing his pain medication yesterday to oxycodone 5-10mg every 4 hours prn.  Last had his oxycodone at 520am and at 7am assessed him.    Rates pain 7/10 but appears comfortable, watching TV.  Hand grasp is not strong.  B/P = 101/72 and T = 98    Continue to monitor and have regular NP follow up tomorrow.    Electronically signed by Viviane Lennon RN, CNP

## 2019-03-25 ENCOUNTER — MEDICAL CORRESPONDENCE (OUTPATIENT)
Dept: HEALTH INFORMATION MANAGEMENT | Facility: CLINIC | Age: 74
End: 2019-03-25

## 2019-03-25 ENCOUNTER — NURSING HOME VISIT (OUTPATIENT)
Dept: GERIATRICS | Facility: CLINIC | Age: 74
End: 2019-03-25
Payer: MEDICARE

## 2019-03-25 ENCOUNTER — HOSPITAL ENCOUNTER (OUTPATIENT)
Facility: CLINIC | Age: 74
Discharge: NURSING, REHAB OR LONG-TERM CARE | End: 2019-03-25
Attending: INTERNAL MEDICINE | Admitting: INTERNAL MEDICINE
Payer: MEDICARE

## 2019-03-25 VITALS
DIASTOLIC BLOOD PRESSURE: 67 MMHG | BODY MASS INDEX: 30.2 KG/M2 | OXYGEN SATURATION: 95 % | TEMPERATURE: 98.4 F | HEART RATE: 57 BPM | HEIGHT: 72 IN | SYSTOLIC BLOOD PRESSURE: 121 MMHG | WEIGHT: 223 LBS | RESPIRATION RATE: 16 BRPM

## 2019-03-25 DIAGNOSIS — D64.9 ANEMIA, UNSPECIFIED TYPE: ICD-10-CM

## 2019-03-25 DIAGNOSIS — Z86.711 HISTORY OF PULMONARY EMBOLISM: ICD-10-CM

## 2019-03-25 DIAGNOSIS — R78.81 MSSA BACTEREMIA: Primary | ICD-10-CM

## 2019-03-25 DIAGNOSIS — S80.12XD HEMATOMA OF LEFT LOWER EXTREMITY, SUBSEQUENT ENCOUNTER: ICD-10-CM

## 2019-03-25 DIAGNOSIS — B95.61 MSSA BACTEREMIA: Primary | ICD-10-CM

## 2019-03-25 DIAGNOSIS — K70.31 ALCOHOLIC CIRRHOSIS OF LIVER WITH ASCITES (H): ICD-10-CM

## 2019-03-25 DIAGNOSIS — M79.622 PAIN OF LEFT UPPER ARM: ICD-10-CM

## 2019-03-25 DIAGNOSIS — R53.81 PHYSICAL DECONDITIONING: ICD-10-CM

## 2019-03-25 DIAGNOSIS — I25.5 ISCHEMIC CARDIOMYOPATHY: ICD-10-CM

## 2019-03-25 DIAGNOSIS — I50.22 CHRONIC SYSTOLIC CONGESTIVE HEART FAILURE (H): ICD-10-CM

## 2019-03-25 DIAGNOSIS — I10 ESSENTIAL HYPERTENSION: ICD-10-CM

## 2019-03-25 DIAGNOSIS — N18.30 CKD (CHRONIC KIDNEY DISEASE) STAGE 3, GFR 30-59 ML/MIN (H): ICD-10-CM

## 2019-03-25 DIAGNOSIS — I48.0 PAROXYSMAL ATRIAL FIBRILLATION (H): ICD-10-CM

## 2019-03-25 PROCEDURE — 25000125 ZZHC RX 250: Performed by: INTERNAL MEDICINE

## 2019-03-25 PROCEDURE — 27211407 ZZ H KIT CATH IV 18 OR 20 G, POWERGLIDE BASIC

## 2019-03-25 PROCEDURE — 99309 SBSQ NF CARE MODERATE MDM 30: CPT | Performed by: NURSE PRACTITIONER

## 2019-03-25 PROCEDURE — 40000257 ZZH STATISTIC CONSULT NO CHARGE VASC ACCESS

## 2019-03-25 RX ORDER — AMOXICILLIN 250 MG
1 CAPSULE ORAL DAILY PRN
COMMUNITY
End: 2019-06-06

## 2019-03-25 RX ORDER — LIDOCAINE 40 MG/G
CREAM TOPICAL
Status: DISCONTINUED | OUTPATIENT
Start: 2019-03-25 | End: 2019-03-25 | Stop reason: HOSPADM

## 2019-03-25 RX ORDER — POLYETHYLENE GLYCOL 3350 17 G/17G
17 POWDER, FOR SOLUTION ORAL DAILY PRN
COMMUNITY
End: 2019-04-20

## 2019-03-25 RX ADMIN — LIDOCAINE HYDROCHLORIDE 0.5 ML: 10 INJECTION, SOLUTION EPIDURAL; INFILTRATION; INTRACAUDAL; PERINEURAL at 13:44

## 2019-03-25 ASSESSMENT — MIFFLIN-ST. JEOR: SCORE: 1779.52

## 2019-03-25 NOTE — LETTER
3/25/2019        RE: Antonio Ramirez  [address]  [place] [address]        [facility]  [place] [facility] Record Number:  [id number]  [facility] of [facility] where encounter took place[facility] [[id number]]  Chief Complaint   Patient presents with     Nursing Home Acute       HPI:    Antonio Ramirez  is a 74 year old (1945), who is being seen today for an episodic care visit.  HPI information obtained from: facility chart records, facility staff, patient report and [facility] chart review.   He came to this facility 3/14/2019 for short term rehab and medical management following hospitalization with LLE pain and hematoma after a mechanical fall. Complex medical history including alcoholic liver cirrhosis, CAD, HTN, afib, CHF, antiphospholipid Ab, chronic anticoagulation with Xarelto, history of PE and DVT with IVC filter, history of MSSA abscess of left  stump in 12/2018.   INR was 7.45 in the ED and vitamin K was given. Hgb ws 6.3 and he was transfused. CT left hip showed muscular hematomas, no fracture. MRI lumbar spine with multiple level degenerative disc disease with severe stenosis.   He became febrile and was found to have MSSA bacteremia of unclear source. ID consulted and he discharged on IV Ancef for 22 days. ESPERANZA negative for valvular vegetation. He underwent paracentesis 3/11/2019. GI consulted and EGD was negative for varices. Xarelto was held and resumed at discharge.     Today's concern is:     MSSA bacteremia-his peripheral IV has infiltrated. IV nurse is here and recommends Midline or PICC placement by the hospital IV team, due to previous PICC infiltration of LUE, anemia and bleeding problems. Patient is agreeable to going to the hospital for placement. Afebrile. Denies cough, shortness of breath or chest pain. Good appetite. No diarrhea.   Chronic systolic congestive heart failure (H)-weight unchanged at 223 lbs.   Ischemic  "cardiomyopathy  Paroxysmal atrial fibrillation (H)-HR: 58-74   Xarelto has been on hold since 3/19/2019 due to ongoing bleeding at IV site and INR >3. Vitamin K was given 3/20/2019. INR down to 1.83 on 3/21 and no further bleeding issues.   History of pulmonary embolism  Alcoholic cirrhosis of liver with ascites (H)-thinks his ascites is slightly more, \"or I'm just eating too much.\" No abdominal pain.   CKD (chronic kidney disease) stage 3, GFR 30-59 ml/min (H)  Hematoma of left lower extremity, subsequent encounter-reports pain has improved  Pain of left upper arm-he's had pain and edema of his arm following PICC infiltration. Doppler US done 3/21/2019 was negative for DVT. Oxycodone was discontinued and Norco started yesterday and he reports improved pain control. No fall or trauma.   Essential hypertension-BPs: 121/67, 109/69, 103/67  Anemia, unspecified type-Hgb 8.0 on 3/21/2019, stable  Physical deconditioning-standing for transfers with assist. Requires max assist of 1-2 with cares.     Past Medical and Surgical History reviewed in Epic today.    MEDICATIONS:  Current Outpatient Medications   Medication Sig Dispense Refill     carvedilol (COREG) 3.125 MG tablet TAKE 1 TABLET(3.125 MG) BY MOUTH TWICE DAILY WITH MEALS 180 tablet 0     ceFAZolin (ANCEF) 1 GM vial Inject 2 g into the vein every 8 hours for 22 days ESR,CRP,CBC with differential, creatinine, SGOT weekly while on this medication to be faxed to Dr. Schofield office. 1 each 1     cyanocobalamin (VITAMIN B-12) 500 MCG SUBL sublingual tablet Place 2 tablets (1,000 mcg) under the tongue daily       fluticasone (FLONASE) 50 MCG/ACT nasal spray Spray 1 spray into both nostrils daily as needed       folic acid (FOLVITE) 1 MG tablet Take 1 tablet (1 mg) by mouth daily       gabapentin (NEURONTIN) 600 MG tablet Take 1 tablet (600 mg) by mouth 2 times daily 60 tablet 11     HYDROcodone-acetaminophen (NORCO) 5-325 MG tablet Take 1 tablet by mouth 3 times daily for " 3 days 9 tablet 0     HYDROcodone-acetaminophen (NORCO) 5-325 MG tablet Take 1 tablet by mouth every 6 hours as needed for pain 18 tablet 0     hydrocortisone (CORTAID) 1 % external cream Apply topically 2 times daily as needed       ipratropium (ATROVENT) 0.06 % nasal spray Spray 2 sprays into both nostrils 4 times daily       melatonin 3 MG tablet Take 1 tablet (3 mg) by mouth nightly as needed for sleep       miconazole (MICATIN/MICRO GUARD) 2 % external powder Apply topically every hour as needed for other (topical candidiasis)       multivitamin, therapeutic (THERA-VIT) TABS tablet Take 1 tablet by mouth daily       polyethylene glycol (MIRALAX/GLYCOLAX) packet Take 17 g by mouth daily as needed for constipation       ranitidine (ZANTAC) 150 MG tablet Take 1 tablet (150 mg) by mouth daily       senna-docusate (SENOKOT-S/PERICOLACE) 8.6-50 MG tablet Take 1 tablet by mouth 2 times daily Hold for loose stools.       torsemide (DEMADEX) 10 MG tablet Take 2 tablets (20 mg) by mouth 2 times daily 30 tablet 1     vitamin B1 (THIAMINE) 100 MG tablet Take 1 tablet (100 mg) by mouth daily         REVIEW OF SYSTEMS:  4 point ROS including Respiratory, CV, GI and , other than that noted in the HPI,  is negative    Objective:  /67   Pulse 57   Temp 98.4  F (36.9  C)   Resp 16   Ht 1.829 m (6')   Wt 101.2 kg (223 lb)   SpO2 95%   BMI 30.24 kg/m     Exam:  GENERAL APPEARANCE:  Alert, in no distress  ENT:  Mouth and posterior oropharynx normal, moist mucous membranes, normal hearing acuity  EYES:  EOM normal, conjunctiva and lids normal, PERRL  NECK:  No adenopathy,masses or thyromegaly  RESP:  respiratory effort and palpation of chest normal, no respiratory distress, diminished breath sounds bilaterally, no crackles or wheezes  CV:  Palpation and auscultation of heart done , regular rate and rhythm, 3/6 murmur,  1+ edema RLE with +pedal pulses  ABDOMEN:   mild ascites, soft, non-tender, no distension, no  masses  M/S:   in bed. Wearing LLE prosthesis.Good strength of extremities. LUE edema with fading hematoma, no warmth or erythema, full ROM  SKIN:   no rashes or open areas  PSYCH:  oriented X 3, memory impaired, affect normal  Labs:   Labs done in SNF are in [place] EPIC. Please refer to them using EPIC/Care Everywhere.    ASSESSMENT / PLAN:  (R78.81) MSSA bacteremia  (primary encounter diagnosis)  Comment: unclear source, appears to be resolving. Unable to place IV line onsite due to concerns re: bleeding and recent infiltration of LUE  Plan: continue IV Ancef through 4/6/2019, per ID. Weekly labs tomorrow, results to Dr Schofield. Appointment was scheduled with the hospital IV team for this afternoon to place Midline or PICC.     (I50.22) Chronic systolic congestive heart failure (H)  (I25.5) Ischemic cardiomyopathy  Comment: improvement in chronic LE edema. No other s/s of volume overload  Plan: continue torsemide, carvedilol. Daily weight.     (I48.0) Paroxysmal atrial fibrillation (H)  (Z86.711) History of pulmonary embolism  Comment: rate controlled. Xarelto on hold due to bleeding at IV sites and supratherapeutic INR.   Plan: continue carvedilol. Continue to hold Xarelto.     (K70.31) Alcoholic cirrhosis of liver with ascites (H)  Comment: chronic, mild ascites. Paracentesis 3/11/2019  Plan: continue diuretic. Continue thiamine, MVI, folate. Outpatient paracentesis prn.     (N18.3) CKD (chronic kidney disease) stage 3, GFR 30-59 ml/min (H)  Comment: renal function at baseline  Plan: BMP. Avoid nephrotoxins.     (S80.12XD) Hematoma of left lower extremity, subsequent encounter  Comment: improving  Plan: continue Norco. Continue gabapentin for neuropathic pain, s/p left BKA.     (M79.622) Pain of left upper arm  Comment: hematoma and edema slowly improving. Pain controlled. Doppler US was negative.   Plan: continue [place]. Elevate arm, ice prn.     (I10) Essential hypertension  Comment: asymptomatic, mild  hypotension  Plan: continue torsemide, continue carvedilol for rate control. Monitor VS    (D64.9) Anemia, unspecified type  Comment: Hgb has stabilized. No s/s of active bleeding   Plan: CBC    (R53.81) Physical deconditioning  Comment: slowly progressing in therapies  Plan: continue PHYSICAL THERAPY/OT. Goal is to return home with his wife and home services.         Total time spent with patient visit at the skilled nursing facility was 38 mins including patient visit and review of past records. Greater than 50% of total time spent with counseling and coordinating care due to complexity of care, acute issue  Electronically signed by:  DIPAK Kennedy CNP             Sincerely,        DIPAK Kennedy CNP

## 2019-03-25 NOTE — PROGRESS NOTES
[facility]  [place] [facility] Record Number:  [id number]  Place of Service where encounter took place:  [facility] ([facility] [[id number]]  Chief Complaint   Patient presents with     Nursing Home Acute       HPI:    Antonio Ramirez  is a 74 year old (1945), who is being seen today for an episodic care visit.  HPI information obtained from: facility chart records, facility staff, patient report and [facility] chart review.   He came to this facility 3/14/2019 for short term rehab and medical management following hospitalization with LLE pain and hematoma after a mechanical fall. Complex medical history including alcoholic liver cirrhosis, CAD, HTN, afib, CHF, antiphospholipid Ab, chronic anticoagulation with Xarelto, history of PE and DVT with IVC filter, history of MSSA abscess of left  stump in 12/2018.   INR was 7.45 in the ED and vitamin K was given. Hgb ws 6.3 and he was transfused. CT left hip showed muscular hematomas, no fracture. MRI lumbar spine with multiple level degenerative disc disease with severe stenosis.   He became febrile and was found to have MSSA bacteremia of unclear source. ID consulted and he discharged on IV Ancef for 22 days. ESPERANZA negative for valvular vegetation. He underwent paracentesis 3/11/2019. GI consulted and EGD was negative for varices. Xarelto was held and resumed at discharge.     Today's concern is:     MSSA bacteremia-his peripheral IV has infiltrated. IV nurse is here and recommends Midline or PICC placement by the hospital IV team, due to previous PICC infiltration of LUE, anemia and bleeding problems. Patient is agreeable to going to the hospital for placement. Afebrile. Denies cough, shortness of breath or chest pain. Good appetite. No diarrhea.   Chronic systolic congestive heart failure (H)-weight unchanged at 223 lbs.   Ischemic cardiomyopathy  Paroxysmal atrial fibrillation (H)-HR: 58-74   Xarelto has been on hold since 3/19/2019 due  "to ongoing bleeding at IV site and INR >3. Vitamin K was given 3/20/2019. INR down to 1.83 on 3/21 and no further bleeding issues.   History of pulmonary embolism  Alcoholic cirrhosis of liver with ascites (H)-thinks his ascites is slightly more, \"or I'm just eating too much.\" No abdominal pain.   CKD (chronic kidney disease) stage 3, GFR 30-59 ml/min (H)  Hematoma of left lower extremity, subsequent encounter-reports pain has improved  Pain of left upper arm-he's had pain and edema of his arm following PICC infiltration. Doppler US done 3/21/2019 was negative for DVT. Oxycodone was discontinued and Norco started yesterday and he reports improved pain control. No fall or trauma.   Essential hypertension-BPs: 121/67, 109/69, 103/67  Anemia, unspecified type-Hgb 8.0 on 3/21/2019, stable  Physical deconditioning-standing for transfers with assist. Requires max assist of 1-2 with cares.     Past Medical and Surgical History reviewed in Epic today.    MEDICATIONS:  Current Outpatient Medications   Medication Sig Dispense Refill     carvedilol (COREG) 3.125 MG tablet TAKE 1 TABLET(3.125 MG) BY MOUTH TWICE DAILY WITH MEALS 180 tablet 0     ceFAZolin (ANCEF) 1 GM vial Inject 2 g into the vein every 8 hours for 22 days ESR,CRP,CBC with differential, creatinine, SGOT weekly while on this medication to be faxed to Dr. Schofield office. 1 each 1     cyanocobalamin (VITAMIN B-12) 500 MCG SUBL sublingual tablet Place 2 tablets (1,000 mcg) under the tongue daily       fluticasone (FLONASE) 50 MCG/ACT nasal spray Spray 1 spray into both nostrils daily as needed       folic acid (FOLVITE) 1 MG tablet Take 1 tablet (1 mg) by mouth daily       gabapentin (NEURONTIN) 600 MG tablet Take 1 tablet (600 mg) by mouth 2 times daily 60 tablet 11     HYDROcodone-acetaminophen (NORCO) 5-325 MG tablet Take 1 tablet by mouth 3 times daily for 3 days 9 tablet 0     HYDROcodone-acetaminophen (NORCO) 5-325 MG tablet Take 1 tablet by mouth every 6 hours " as needed for pain 18 tablet 0     hydrocortisone (CORTAID) 1 % external cream Apply topically 2 times daily as needed       ipratropium (ATROVENT) 0.06 % nasal spray Spray 2 sprays into both nostrils 4 times daily       melatonin 3 MG tablet Take 1 tablet (3 mg) by mouth nightly as needed for sleep       miconazole (MICATIN/MICRO GUARD) 2 % external powder Apply topically every hour as needed for other (topical candidiasis)       multivitamin, therapeutic (THERA-VIT) TABS tablet Take 1 tablet by mouth daily       polyethylene glycol (MIRALAX/GLYCOLAX) packet Take 17 g by mouth daily as needed for constipation       ranitidine (ZANTAC) 150 MG tablet Take 1 tablet (150 mg) by mouth daily       senna-docusate (SENOKOT-S/PERICOLACE) 8.6-50 MG tablet Take 1 tablet by mouth 2 times daily Hold for loose stools.       torsemide (DEMADEX) 10 MG tablet Take 2 tablets (20 mg) by mouth 2 times daily 30 tablet 1     vitamin B1 (THIAMINE) 100 MG tablet Take 1 tablet (100 mg) by mouth daily         REVIEW OF SYSTEMS:  4 point ROS including Respiratory, CV, GI and , other than that noted in the HPI,  is negative    Objective:  /67   Pulse 57   Temp 98.4  F (36.9  C)   Resp 16   Ht 1.829 m (6')   Wt 101.2 kg (223 lb)   SpO2 95%   BMI 30.24 kg/m    Exam:  GENERAL APPEARANCE:  Alert, in no distress  ENT:  Mouth and posterior oropharynx normal, moist mucous membranes, normal hearing acuity  EYES:  EOM normal, conjunctiva and lids normal, PERRL  NECK:  No adenopathy,masses or thyromegaly  RESP:  respiratory effort and palpation of chest normal, no respiratory distress, diminished breath sounds bilaterally, no crackles or wheezes  CV:  Palpation and auscultation of heart done , regular rate and rhythm, 3/6 murmur, 1+ edema RLE with +pedal pulses  ABDOMEN:  mild ascites, soft, non-tender, no distension, no masses  M/S:   in bed. Wearing LLE prosthesis.Good strength of extremities. LUE edema with fading hematoma, no warmth  or erythema, full ROM  SKIN:  no rashes or open areas  PSYCH:  oriented X 3, memory impaired, affect normal  Labs:   Labs done in SNF are in [place] EPIC. Please refer to them using EPIC/Care Everywhere.    ASSESSMENT / PLAN:  (R78.81) MSSA bacteremia  (primary encounter diagnosis)  Comment: unclear source, appears to be resolving. Unable to place IV line onsite due to concerns re: bleeding and recent infiltration of LUE  Plan: continue IV Ancef through 4/6/2019, per ID. Weekly labs tomorrow, results to Dr Schofield. Appointment was scheduled with the hospital IV team for this afternoon to place Midline or PICC.     (I50.22) Chronic systolic congestive heart failure (H)  (I25.5) Ischemic cardiomyopathy  Comment: improvement in chronic LE edema. No other s/s of volume overload  Plan: continue torsemide, carvedilol. Daily weight.     (I48.0) Paroxysmal atrial fibrillation (H)  (Z86.711) History of pulmonary embolism  Comment: rate controlled. Xarelto on hold due to bleeding at IV sites and supratherapeutic INR.   Plan: continue carvedilol. Continue to hold Xarelto.     (K70.31) Alcoholic cirrhosis of liver with ascites (H)  Comment: chronic, mild ascites. Paracentesis 3/11/2019  Plan: continue diuretic. Continue thiamine, MVI, folate. Outpatient paracentesis prn.     (N18.3) CKD (chronic kidney disease) stage 3, GFR 30-59 ml/min (H)  Comment: renal function at baseline  Plan: BMP. Avoid nephrotoxins.     (S80.12XD) Hematoma of left lower extremity, subsequent encounter  Comment: improving  Plan: continue Norco. Continue gabapentin for neuropathic pain, s/p left BKA.     (M79.622) Pain of left upper arm  Comment: hematoma and edema slowly improving. Pain controlled. Doppler US was negative.   Plan: continue [place]. Elevate arm, ice prn.     (I10) Essential hypertension  Comment: asymptomatic, mild hypotension  Plan: continue torsemide, continue carvedilol for rate control. Monitor VS    (D64.9) Anemia, unspecified  type  Comment: Hgb has stabilized. No s/s of active bleeding   Plan: CBC    (R53.81) Physical deconditioning  Comment: slowly progressing in therapies  Plan: continue PHYSICAL THERAPY/OT. Goal is to return home with his wife and home services.         Total time spent with patient visit at the skilled nursing facility was 38 mins including patient visit and review of past records. Greater than 50% of total time spent with counseling and coordinating care due to complexity of care, acute issue  Electronically signed by:  DIPAK Kennedy CNP

## 2019-03-26 ENCOUNTER — HOSPITAL LABORATORY (OUTPATIENT)
Dept: OTHER | Facility: CLINIC | Age: 74
End: 2019-03-26

## 2019-03-26 ENCOUNTER — PATIENT OUTREACH (OUTPATIENT)
Dept: CARE COORDINATION | Facility: CLINIC | Age: 74
End: 2019-03-26

## 2019-03-26 LAB
ANION GAP SERPL CALCULATED.3IONS-SCNC: 6 MMOL/L (ref 3–14)
AST SERPL W P-5'-P-CCNC: 28 U/L (ref 0–45)
BASOPHILS # BLD AUTO: 0 10E9/L (ref 0–0.2)
BASOPHILS NFR BLD AUTO: 0.8 %
BUN SERPL-MCNC: 38 MG/DL (ref 7–30)
CALCIUM SERPL-MCNC: 8.4 MG/DL (ref 8.5–10.1)
CHLORIDE SERPL-SCNC: 100 MMOL/L (ref 94–109)
CO2 SERPL-SCNC: 29 MMOL/L (ref 20–32)
CREAT SERPL-MCNC: 1.88 MG/DL (ref 0.66–1.25)
CRP SERPL-MCNC: 28.2 MG/L (ref 0–8)
DIFFERENTIAL METHOD BLD: ABNORMAL
EOSINOPHIL # BLD AUTO: 0.2 10E9/L (ref 0–0.7)
EOSINOPHIL NFR BLD AUTO: 5.4 %
ERYTHROCYTE [DISTWIDTH] IN BLOOD BY AUTOMATED COUNT: 14.8 % (ref 10–15)
ERYTHROCYTE [SEDIMENTATION RATE] IN BLOOD BY WESTERGREN METHOD: 38 MM/H (ref 0–20)
GFR SERPL CREATININE-BSD FRML MDRD: 34 ML/MIN/{1.73_M2}
GLUCOSE SERPL-MCNC: 71 MG/DL (ref 70–99)
HCT VFR BLD AUTO: 24.9 % (ref 40–53)
HGB BLD-MCNC: 7.9 G/DL (ref 13.3–17.7)
IMM GRANULOCYTES # BLD: 0 10E9/L (ref 0–0.4)
IMM GRANULOCYTES NFR BLD: 0.3 %
LYMPHOCYTES # BLD AUTO: 0.6 10E9/L (ref 0.8–5.3)
LYMPHOCYTES NFR BLD AUTO: 16.1 %
MCH RBC QN AUTO: 30.7 PG (ref 26.5–33)
MCHC RBC AUTO-ENTMCNC: 31.7 G/DL (ref 31.5–36.5)
MCV RBC AUTO: 97 FL (ref 78–100)
MONOCYTES # BLD AUTO: 0.6 10E9/L (ref 0–1.3)
MONOCYTES NFR BLD AUTO: 15.6 %
NEUTROPHILS # BLD AUTO: 2.2 10E9/L (ref 1.6–8.3)
NEUTROPHILS NFR BLD AUTO: 61.8 %
NRBC # BLD AUTO: 0 10*3/UL
NRBC BLD AUTO-RTO: 0 /100
PLATELET # BLD AUTO: 156 10E9/L (ref 150–450)
POTASSIUM SERPL-SCNC: 4.4 MMOL/L (ref 3.4–5.3)
RBC # BLD AUTO: 2.57 10E12/L (ref 4.4–5.9)
SODIUM SERPL-SCNC: 135 MMOL/L (ref 133–144)
WBC # BLD AUTO: 3.5 10E9/L (ref 4–11)

## 2019-03-27 ASSESSMENT — ACTIVITIES OF DAILY LIVING (ADL): DEPENDENT_IADLS:: CLEANING;COOKING;LAUNDRY;MEAL PREPARATION

## 2019-03-28 ENCOUNTER — NURSING HOME VISIT (OUTPATIENT)
Dept: GERIATRICS | Facility: CLINIC | Age: 74
End: 2019-03-28
Payer: MEDICARE

## 2019-03-28 ENCOUNTER — HOSPITAL LABORATORY (OUTPATIENT)
Dept: OTHER | Facility: CLINIC | Age: 74
End: 2019-03-28

## 2019-03-28 VITALS
BODY MASS INDEX: 33.7 KG/M2 | WEIGHT: 248.8 LBS | OXYGEN SATURATION: 98 % | HEART RATE: 60 BPM | DIASTOLIC BLOOD PRESSURE: 68 MMHG | SYSTOLIC BLOOD PRESSURE: 106 MMHG | RESPIRATION RATE: 18 BRPM | TEMPERATURE: 98 F | HEIGHT: 72 IN

## 2019-03-28 DIAGNOSIS — I10 ESSENTIAL HYPERTENSION: ICD-10-CM

## 2019-03-28 DIAGNOSIS — R78.81 MSSA BACTEREMIA: Primary | ICD-10-CM

## 2019-03-28 DIAGNOSIS — B95.61 MSSA BACTEREMIA: Primary | ICD-10-CM

## 2019-03-28 DIAGNOSIS — D64.9 ANEMIA, UNSPECIFIED TYPE: ICD-10-CM

## 2019-03-28 DIAGNOSIS — I50.22 CHRONIC SYSTOLIC CONGESTIVE HEART FAILURE (H): ICD-10-CM

## 2019-03-28 DIAGNOSIS — I48.0 PAROXYSMAL ATRIAL FIBRILLATION (H): ICD-10-CM

## 2019-03-28 DIAGNOSIS — M79.622 PAIN OF LEFT UPPER ARM: ICD-10-CM

## 2019-03-28 DIAGNOSIS — Z86.711 HISTORY OF PULMONARY EMBOLISM: ICD-10-CM

## 2019-03-28 DIAGNOSIS — I25.5 ISCHEMIC CARDIOMYOPATHY: ICD-10-CM

## 2019-03-28 DIAGNOSIS — K70.31 ALCOHOLIC CIRRHOSIS OF LIVER WITH ASCITES (H): ICD-10-CM

## 2019-03-28 DIAGNOSIS — R53.81 PHYSICAL DECONDITIONING: ICD-10-CM

## 2019-03-28 DIAGNOSIS — S80.12XD HEMATOMA OF LEFT LOWER EXTREMITY, SUBSEQUENT ENCOUNTER: ICD-10-CM

## 2019-03-28 DIAGNOSIS — N18.30 CKD (CHRONIC KIDNEY DISEASE) STAGE 3, GFR 30-59 ML/MIN (H): ICD-10-CM

## 2019-03-28 LAB
ANION GAP SERPL CALCULATED.3IONS-SCNC: 7 MMOL/L (ref 3–14)
BUN SERPL-MCNC: 42 MG/DL (ref 7–30)
CALCIUM SERPL-MCNC: 8.1 MG/DL (ref 8.5–10.1)
CHLORIDE SERPL-SCNC: 100 MMOL/L (ref 94–109)
CO2 SERPL-SCNC: 27 MMOL/L (ref 20–32)
CREAT SERPL-MCNC: 1.73 MG/DL (ref 0.66–1.25)
GFR SERPL CREATININE-BSD FRML MDRD: 38 ML/MIN/{1.73_M2}
GLUCOSE SERPL-MCNC: 75 MG/DL (ref 70–99)
HGB BLD-MCNC: 8.6 G/DL (ref 13.3–17.7)
POTASSIUM SERPL-SCNC: 4.3 MMOL/L (ref 3.4–5.3)
SODIUM SERPL-SCNC: 134 MMOL/L (ref 133–144)

## 2019-03-28 PROCEDURE — 99309 SBSQ NF CARE MODERATE MDM 30: CPT | Performed by: NURSE PRACTITIONER

## 2019-03-28 RX ORDER — TORSEMIDE 20 MG/1
20 TABLET ORAL 2 TIMES DAILY
COMMUNITY
End: 2019-04-20

## 2019-03-28 RX ORDER — HYDROCODONE BITARTRATE AND ACETAMINOPHEN 5; 325 MG/1; MG/1
1 TABLET ORAL EVERY 6 HOURS PRN
Status: ON HOLD | COMMUNITY
End: 2019-06-19

## 2019-03-28 ASSESSMENT — MIFFLIN-ST. JEOR: SCORE: 1896.55

## 2019-03-28 NOTE — PROGRESS NOTES
[facility]  [place] Medical Record Number:  [id number]  Place of Service where encounter took place:  [facility] ([facility]) [[id number]]  Chief Complaint   Patient presents with     RECHECK       HPI:    Antonio Ramirez  is a 74 year old (1945), who is being seen today for an episodic care visit.  HPI information obtained from: facility chart records, facility staff, patient report and [facility] chart review.   He came to this facility 3/14/2019 for short term rehab and medical management following hospitalization with LLE pain and hematoma after a mechanical fall. Complex medical history including alcoholic liver cirrhosis, CAD, HTN, afib, CHF, antiphospholipid Ab, chronic anticoagulation with Xarelto, history of PE and DVT with IVC filter, history of MSSA abscess of left  stump in 12/2018.   INR was 7.45 in the ED and vitamin K was given. Hgb ws 6.3 and he was transfused. CT left hip showed muscular hematomas, no fracture. MRI lumbar spine with multiple level degenerative disc disease with severe stenosis.   He became febrile and was found to have MSSA bacteremia of unclear source. ID consulted and he discharged on IV Ancef for 22 days. ESPERANZA negative for valvular vegetation. He underwent paracentesis 3/11/2019. GI consulted and EGD was negative for varices. Xarelto was held and resumed at discharge.     Today's concern is:     MSSA bacteremia-afebrile. Denies feeling ill. Xarelto has been on hold due to bleeding from IV sites and anemia. RUE Midline catheter was placed at the hospital by the IV team 3/25/2019 and has been working well with no bleeding.   Chronic systolic congestive heart failure (H)-his weights are highly variable, some taken with his prosthesis, others without. He denies cough, shortness of breath or chest pain. LE edema has improved. Torsemide was decreased 3/26/2019 for bump in creatinine to 1.88. Creatinine improved at 1.73 today  Ischemic  "cardiomyopathy  Paroxysmal atrial fibrillation (H)-HR: 57-74  History of pulmonary embolism  Alcoholic cirrhosis of liver with ascites (H)-ascites appears more on exam, but he thinks he's eating too much. Denies pain.   CKD (chronic kidney disease) stage 3, GFR 30-59 ml/min (H)  Hematoma of left lower extremity, subsequent encounter-report this has improved and is well controlled.   Pain of left upper arm-developed edema, pain and a hematoma from PICC infiltration. Reports pain is \"finally\" improving. He does not think the edema has improved, although is appears less on exam.   Essential hypertension-BPs: 106/68, 121/67, 109/69  Anemia, unspecified type  Physical deconditioning-ambulating 20 ft with walker and min assist of 2. Requires max assist of 1-2 with transfers and cares.   SLUMS 21/30, CPT 4.8/5.6    Past Medical and Surgical History reviewed in Epic today.    MEDICATIONS:  Current Outpatient Medications   Medication Sig Dispense Refill     carvedilol (COREG) 3.125 MG tablet TAKE 1 TABLET(3.125 MG) BY MOUTH TWICE DAILY WITH MEALS 180 tablet 0     ceFAZolin (ANCEF) 1 GM vial Inject 2 g into the vein every 8 hours for 22 days ESR,CRP,CBC with differential, creatinine, SGOT weekly while on this medication to be faxed to Dr. Schofield office. 1 each 1     cyanocobalamin (VITAMIN B-12) 500 MCG SUBL sublingual tablet Place 2 tablets (1,000 mcg) under the tongue daily       fluticasone (FLONASE) 50 MCG/ACT nasal spray Spray 1 spray into both nostrils daily as needed       folic acid (FOLVITE) 1 MG tablet Take 1 tablet (1 mg) by mouth daily       gabapentin (NEURONTIN) 600 MG tablet Take 1 tablet (600 mg) by mouth 2 times daily 60 tablet 11     HYDROcodone-acetaminophen (NORCO) 5-325 MG tablet Take 1 tablet by mouth every 6 hours as needed for pain (for left arm pain/LUE)       hydrocortisone (CORTAID) 1 % external cream Apply topically 2 times daily as needed       ipratropium (ATROVENT) 0.06 % nasal spray Spray 2 " sprays into both nostrils 4 times daily       melatonin 3 MG tablet Take 1 tablet (3 mg) by mouth nightly as needed for sleep       miconazole (MICATIN/MICRO GUARD) 2 % external powder Apply topically every hour as needed for other (topical candidiasis)       multivitamin, therapeutic (THERA-VIT) TABS tablet Take 1 tablet by mouth daily       polyethylene glycol (MIRALAX/GLYCOLAX) packet Take 17 g by mouth daily as needed for constipation       ranitidine (ZANTAC) 150 MG tablet Take 1 tablet (150 mg) by mouth daily       senna-docusate (SENOKOT-S/PERICOLACE) 8.6-50 MG tablet Take 1 tablet by mouth 2 times daily Hold for loose stools.       torsemide (DEMADEX) 10 MG tablet Take 2 tablets (20 mg) by mouth 2 times daily (Patient taking differently: Take 20 mg by mouth daily ) 30 tablet 1     torsemide (DEMADEX) 20 MG tablet Take 20 mg by mouth daily       vitamin B1 (THIAMINE) 100 MG tablet Take 1 tablet (100 mg) by mouth daily         REVIEW OF SYSTEMS:  10 point ROS of systems including Constitutional, Eyes, Respiratory, Cardiovascular, Gastroenterology, Genitourinary, Integumentary, Musculoskeletal, Psychiatric were all negative except for pertinent positives noted in my HPI.    Objective:  /68   Pulse 60   Temp 98  F (36.7  C)   Resp 18   Ht 1.829 m (6')   Wt 112.9 kg (248 lb 12.8 oz)   SpO2 98%   BMI 33.74 kg/m    Exam:  GENERAL APPEARANCE:  Alert, in no distress  ENT:  Mouth and posterior oropharynx normal, moist mucous membranes, normal hearing acuity  EYES:  EOM normal, conjunctiva and lids normal  NECK:  No adenopathy,masses or thyromegaly  RESP:  respiratory effort and palpation of chest normal, no respiratory distress, diminished breath sounds bilaterally, no crackles or wheezes  CV:  Palpation and auscultation of heart done , regular rate and rhythm, 3/6 murmur, trace to 1+ edema RLE with +pedal pulses  ABDOMEN:  ascites, soft, non-tender, no distension, no masses  M/S:   in bed. Wearing LLE  prosthesis.Good strength of extremities. Moderate LUE edema with fading hematoma, no warmth or erythema, full ROM  SKIN:  no rashes or open areas  PSYCH:  oriented X 3, memory impaired, affect normal      Labs:   Lab Results   Component Value Date    WBC 3.5 03/26/2019     Lab Results   Component Value Date    RBC 2.57 03/26/2019     Lab Results   Component Value Date    HGB 8.6 03/28/2019     Lab Results   Component Value Date    HCT 24.9 03/26/2019     Lab Results   Component Value Date    MCV 97 03/26/2019     Lab Results   Component Value Date    MCH 30.7 03/26/2019     Lab Results   Component Value Date    MCHC 31.7 03/26/2019     Lab Results   Component Value Date    RDW 14.8 03/26/2019     Lab Results   Component Value Date     03/26/2019     Last Comprehensive Metabolic Panel:  Sodium   Date Value Ref Range Status   03/28/2019 134 133 - 144 mmol/L Final     Potassium   Date Value Ref Range Status   03/28/2019 4.3 3.4 - 5.3 mmol/L Final     Chloride   Date Value Ref Range Status   03/28/2019 100 94 - 109 mmol/L Final     Carbon Dioxide   Date Value Ref Range Status   03/28/2019 27 20 - 32 mmol/L Final     Anion Gap   Date Value Ref Range Status   03/28/2019 7 3 - 14 mmol/L Final     Glucose   Date Value Ref Range Status   03/28/2019 75 70 - 99 mg/dL Final     Urea Nitrogen   Date Value Ref Range Status   03/28/2019 42 (H) 7 - 30 mg/dL Final     Creatinine   Date Value Ref Range Status   03/28/2019 1.73 (H) 0.66 - 1.25 mg/dL Final     GFR Estimate   Date Value Ref Range Status   03/28/2019 38 (L) >60 mL/min/[1.73_m2] Final     Comment:     Non  GFR Calc  Starting 12/18/2018, serum creatinine based estimated GFR (eGFR) will be   calculated using the Chronic Kidney Disease Epidemiology Collaboration   (CKD-EPI) equation.       Calcium   Date Value Ref Range Status   03/28/2019 8.1 (L) 8.5 - 10.1 mg/dL Final         ASSESSMENT / PLAN:  (R78.81) MSSA bacteremia  (primary encounter  diagnosis)  Comment: infection appears resolved  Plan: received orders from ID to discontinue Ancef after doses 4/2/2019. Weekly labs 4/1/2019. Blood cultures 4/8/2019.     (I50.22) Chronic systolic congestive heart failure (H)  (I25.5) Ischemic cardiomyopathy  Comment: weights are erratic, but volume status appears improved. Torsemide decreased for bump in creatinine   Plan: continue torsemide daily. Continue carvedilol. Discussed daily weights with staff. BMP 4/1/2019    (I48.0) Paroxysmal atrial fibrillation (H)  (Z86.711) History of pulmonary embolism  Comment: rate controlled. Xarelto on hold due to bleeding at IV sites and anemia  Plan: continue carvedilol. Resume Xarelto when PICC pulled next week.     (K70.31) Alcoholic cirrhosis of liver with ascites (H)  Comment: chronic ascites, currently asymptomatic. Most recent paracentesis was 3/11/2019  Plan: continue torsemide. Continue thiamine, MVI, folate. Schedule outpatient paracentesis if symptomatic.     (N18.3) CKD (chronic kidney disease) stage 3, GFR 30-59 ml/min (H)  Comment: slight improvement in creatinine   Plan: torsemide as above. BMP 4/1/2019    (S80.12XD) Hematoma of left lower extremity, subsequent encounter  Comment: improved  Plan: continue Norco. Continue gabapentin for neuropathic pain.     (M79.622) Pain of left upper arm  Comment: pain and edema improving. Doppler US was negative  Plan: continue Norco    (I10) Essential hypertension  Comment: asymptomatic hypotension  Plan: continue torsemide, continue carvedilol for rate control. Monitor VS    (D64.9) Anemia, unspecified type  Comment: Hgb improved  Plan: follow Hgb    (R53.81) Physical deconditioning  Comment: slow progress in therapies  Plan: continue PHYSICAL THERAPY/OT. Goal is to return home with his wife and services.       Electronically signed by:  DIPAK Kennedy CNP

## 2019-03-28 NOTE — LETTER
3/28/2019        RE: Antonio Ramirez  [address]  [place] [address]        [facility]  [place] [facility] Record Number:  [id number]  Place of Service where encounter took place:  [facility] ([facility] [[id number]]  Chief Complaint   Patient presents with     RECHECK       HPI:    Antonio Ramirez  is a 74 year old (1945), who is being seen today for an episodic care visit.  HPI information obtained from: facility chart records, facility staff, patient report and [facility] chart review.   He came to this facility 3/14/2019 for short term rehab and medical management following hospitalization with LLE pain and hematoma after a mechanical fall. Complex medical history including alcoholic liver cirrhosis, CAD, HTN, afib, CHF, antiphospholipid Ab, chronic anticoagulation with Xarelto, history of PE and DVT with IVC filter, history of MSSA abscess of left  stump in 12/2018.   INR was 7.45 in the ED and vitamin K was given. Hgb ws 6.3 and he was transfused. CT left hip showed muscular hematomas, no fracture. MRI lumbar spine with multiple level degenerative disc disease with severe stenosis.   He became febrile and was found to have MSSA bacteremia of unclear source. ID consulted and he discharged on IV Ancef for 22 days. ESPERANZA negative for valvular vegetation. He underwent paracentesis 3/11/2019. GI consulted and EGD was negative for varices. Xarelto was held and resumed at discharge.     Today's concern is:     MSSA bacteremia-afebrile. Denies feeling ill. Xarelto has been on hold due to bleeding from IV sites and anemia. RUE Midline catheter was placed at the hospital by the IV team 3/25/2019 and has been working well with no bleeding.   Chronic systolic congestive heart failure (H)-his weights are highly variable, some taken with his prosthesis, others without. He denies cough, shortness of breath or chest pain. LE edema has improved. Torsemide was decreased  "3/26/2019 for bump in creatinine to 1.88. Creatinine improved at 1.73 today  Ischemic cardiomyopathy  Paroxysmal atrial fibrillation (H)-HR: 57-74  History of pulmonary embolism  Alcoholic cirrhosis of liver with ascites (H)-ascites appears more on exam, but he thinks he's eating too much. Denies pain.   CKD (chronic kidney disease) stage 3, GFR 30-59 ml/min (H)  Hematoma of left lower extremity, subsequent encounter-report this has improved and is well controlled.   Pain of left upper arm-developed edema, pain and a hematoma from PICC infiltration. Reports pain is \"finally\" improving. He does not think the edema has improved, although is appears less on exam.   Essential hypertension-BPs: 106/68, 121/67, 109/69  Anemia, unspecified type  Physical deconditioning-ambulating 20 ft with walker and min assist of 2. Requires max assist of 1-2 with transfers and cares.   SLUMS 21/30, CPT 4.8/5.6    Past Medical and Surgical History reviewed in Epic today.    MEDICATIONS:  Current Outpatient Medications   Medication Sig Dispense Refill     carvedilol (COREG) 3.125 MG tablet TAKE 1 TABLET(3.125 MG) BY MOUTH TWICE DAILY WITH MEALS 180 tablet 0     ceFAZolin (ANCEF) 1 GM vial Inject 2 g into the vein every 8 hours for 22 days ESR,CRP,CBC with differential, creatinine, SGOT weekly while on this medication to be faxed to Dr. Schofield office. 1 each 1     cyanocobalamin (VITAMIN B-12) 500 MCG SUBL sublingual tablet Place 2 tablets (1,000 mcg) under the tongue daily       fluticasone (FLONASE) 50 MCG/ACT nasal spray Spray 1 spray into both nostrils daily as needed       folic acid (FOLVITE) 1 MG tablet Take 1 tablet (1 mg) by mouth daily       gabapentin (NEURONTIN) 600 MG tablet Take 1 tablet (600 mg) by mouth 2 times daily 60 tablet 11     HYDROcodone-acetaminophen (NORCO) 5-325 MG tablet Take 1 tablet by mouth every 6 hours as needed for pain (for left arm pain/LUE)       hydrocortisone (CORTAID) 1 % external cream Apply " topically 2 times daily as needed       ipratropium (ATROVENT) 0.06 % nasal spray Spray 2 sprays into both nostrils 4 times daily       melatonin 3 MG tablet Take 1 tablet (3 mg) by mouth nightly as needed for sleep       miconazole (MICATIN/MICRO GUARD) 2 % external powder Apply topically every hour as needed for other (topical candidiasis)       multivitamin, therapeutic (THERA-VIT) TABS tablet Take 1 tablet by mouth daily       polyethylene glycol (MIRALAX/GLYCOLAX) packet Take 17 g by mouth daily as needed for constipation       ranitidine (ZANTAC) 150 MG tablet Take 1 tablet (150 mg) by mouth daily       senna-docusate (SENOKOT-S/PERICOLACE) 8.6-50 MG tablet Take 1 tablet by mouth 2 times daily Hold for loose stools.       torsemide (DEMADEX) 10 MG tablet Take 2 tablets (20 mg) by mouth 2 times daily (Patient taking differently: Take 20 mg by mouth daily ) 30 tablet 1     torsemide (DEMADEX) 20 MG tablet Take 20 mg by mouth daily       vitamin B1 (THIAMINE) 100 MG tablet Take 1 tablet (100 mg) by mouth daily         REVIEW OF SYSTEMS:  10 point ROS of systems including Constitutional, Eyes, Respiratory, Cardiovascular, Gastroenterology, Genitourinary, Integumentary, Musculoskeletal, Psychiatric were all negative except for pertinent positives noted in my HPI.    Objective:  /68   Pulse 60   Temp 98  F (36.7  C)   Resp 18   Ht 1.829 m (6')   Wt 112.9 kg (248 lb 12.8 oz)   SpO2 98%   BMI 33.74 kg/m     Exam:  GENERAL APPEARANCE:  Alert, in no distress  ENT:  Mouth and posterior oropharynx normal, moist mucous membranes, normal hearing acuity  EYES:  EOM normal, conjunctiva and lids normal  NECK:  No adenopathy,masses or thyromegaly  RESP:  respiratory effort and palpation of chest normal, no respiratory distress, diminished breath sounds bilaterally, no crackles or wheezes  CV:  Palpation and auscultation of heart done , regular rate and rhythm, 3/6 murmur, trace to 1+ edema RLE with +pedal  pulses  ABDOMEN:  ascites, soft, non-tender, no distension, no masses  M/S:   in bed. Wearing LLE prosthesis.Good strength of extremities. Moderate LUE edema with fading hematoma, no warmth or erythema, full ROM  SKIN:  no rashes or open areas  PSYCH:  oriented X 3, memory impaired, affect normal      Labs:   Lab Results   Component Value Date    WBC 3.5 03/26/2019     Lab Results   Component Value Date    RBC 2.57 03/26/2019     Lab Results   Component Value Date    HGB 8.6 03/28/2019     Lab Results   Component Value Date    HCT 24.9 03/26/2019     Lab Results   Component Value Date    MCV 97 03/26/2019     Lab Results   Component Value Date    MCH 30.7 03/26/2019     Lab Results   Component Value Date    MCHC 31.7 03/26/2019     Lab Results   Component Value Date    RDW 14.8 03/26/2019     Lab Results   Component Value Date     03/26/2019     Last Comprehensive Metabolic Panel:  Sodium   Date Value Ref Range Status   03/28/2019 134 133 - 144 mmol/L Final     Potassium   Date Value Ref Range Status   03/28/2019 4.3 3.4 - 5.3 mmol/L Final     Chloride   Date Value Ref Range Status   03/28/2019 100 94 - 109 mmol/L Final     Carbon Dioxide   Date Value Ref Range Status   03/28/2019 27 20 - 32 mmol/L Final     Anion Gap   Date Value Ref Range Status   03/28/2019 7 3 - 14 mmol/L Final     Glucose   Date Value Ref Range Status   03/28/2019 75 70 - 99 mg/dL Final     Urea Nitrogen   Date Value Ref Range Status   03/28/2019 42 (H) 7 - 30 mg/dL Final     Creatinine   Date Value Ref Range Status   03/28/2019 1.73 (H) 0.66 - 1.25 mg/dL Final     GFR Estimate   Date Value Ref Range Status   03/28/2019 38 (L) >60 mL/min/[1.73_m2] Final     Comment:     Non  GFR Calc  Starting 12/18/2018, serum creatinine based estimated GFR (eGFR) will be   calculated using the Chronic Kidney Disease Epidemiology Collaboration   (CKD-EPI) equation.       Calcium   Date Value Ref Range Status   03/28/2019 8.1 (L) 8.5 -  10.1 mg/dL Final         ASSESSMENT / PLAN:  (R78.81) MSSA bacteremia  (primary encounter diagnosis)  Comment: infection appears resolved  Plan: received orders from ID to discontinue Ancef after doses 4/2/2019. Weekly labs 4/1/2019. Blood cultures 4/8/2019.     (I50.22) Chronic systolic congestive heart failure (H)  (I25.5) Ischemic cardiomyopathy  Comment: weights are erratic, but volume status appears improved. Torsemide decreased for bump in creatinine   Plan: continue torsemide daily. Continue carvedilol. Discussed daily weights with staff. BMP 4/1/2019    (I48.0) Paroxysmal atrial fibrillation (H)  (Z86.711) History of pulmonary embolism  Comment: rate controlled. Xarelto on hold due to bleeding at IV sites and anemia  Plan: continue carvedilol. Resume Xarelto when PICC pulled next week.     (K70.31) Alcoholic cirrhosis of liver with ascites (H)  Comment: chronic ascites, currently asymptomatic. Most recent paracentesis was 3/11/2019  Plan: continue torsemide. Continue thiamine, MVI, folate. Schedule outpatient paracentesis if symptomatic.     (N18.3) CKD (chronic kidney disease) stage 3, GFR 30-59 ml/min (H)  Comment: slight improvement in creatinine   Plan: torsemide as above. BMP 4/1/2019    (S80.12XD) Hematoma of left lower extremity, subsequent encounter  Comment: improved  Plan: continue Norco. Continue gabapentin for neuropathic pain.     (M79.622) Pain of left upper arm  Comment: pain and edema improving. Doppler US was negative  Plan: continue Norco    (I10) Essential hypertension  Comment: asymptomatic hypotension  Plan: continue torsemide, continue carvedilol for rate control. Monitor VS    (D64.9) Anemia, unspecified type  Comment: Hgb improved  Plan: follow Hgb    (R53.81) Physical deconditioning  Comment: slow progress in therapies  Plan: continue PHYSICAL THERAPY/OT. Goal is to return home with his wife and services.       Electronically signed by:  DIPAK Kennedy  CNP            Sincerely,        DIPAK Kennedy CNP

## 2019-03-29 NOTE — PROGRESS NOTES
Clinic Care Coordination Contact  Chart review/Transition 3/27/19    S: ED report follow-up    B: Patient was hospitalized at [facility] from 2/28/19 to 3/14/19 with diagnosis of methicillin-sensitive Staphylococcus aureus bacteremia; left leg hematoma; mechanical fall; alcoholic cirrhosis with ascites; portal hypertension; anasarca; chronic systolic CHF. He was discharged to [place] [facility]. RN Clinic Care Coordinator outreached to LSW from facility at that time.    A: Patient was seen at [facility]'s ED on 3/25/19 for midline/PICC placement. Previous PICC line on [facility] was infiltrated. There were concerns about placing an IV line due to anemia and bleeding. Patient was discharged back to TCU.    R: RN Care Coordinator will await notification from facility's staff informing of patient's discharge plans/needs. No further scheduled chart reviews will be made at this time, unless a new referral occurs.    Diane Chaidez RN Care Coordinator  [facility]  Phone:  [phone] (Mondays, Wednesdays & Fridays)  Phone:  [phone] (Tuesdays & Thursdays)  Email: [email or web address]

## 2019-04-01 ENCOUNTER — HOSPITAL LABORATORY (OUTPATIENT)
Dept: OTHER | Facility: CLINIC | Age: 74
End: 2019-04-01

## 2019-04-01 LAB
ANION GAP SERPL CALCULATED.3IONS-SCNC: 5 MMOL/L (ref 3–14)
AST SERPL W P-5'-P-CCNC: 25 U/L (ref 0–45)
BASOPHILS # BLD AUTO: 0.1 10E9/L (ref 0–0.2)
BASOPHILS NFR BLD AUTO: 1.7 %
BUN SERPL-MCNC: 40 MG/DL (ref 7–30)
CALCIUM SERPL-MCNC: 8.7 MG/DL (ref 8.5–10.1)
CHLORIDE SERPL-SCNC: 102 MMOL/L (ref 94–109)
CO2 SERPL-SCNC: 29 MMOL/L (ref 20–32)
CREAT SERPL-MCNC: 1.71 MG/DL (ref 0.66–1.25)
CRP SERPL-MCNC: 16.7 MG/L (ref 0–8)
DIFFERENTIAL METHOD BLD: ABNORMAL
EOSINOPHIL # BLD AUTO: 0.2 10E9/L (ref 0–0.7)
EOSINOPHIL NFR BLD AUTO: 5.8 %
ERYTHROCYTE [DISTWIDTH] IN BLOOD BY AUTOMATED COUNT: 14.9 % (ref 10–15)
ERYTHROCYTE [SEDIMENTATION RATE] IN BLOOD BY WESTERGREN METHOD: 40 MM/H (ref 0–20)
GFR SERPL CREATININE-BSD FRML MDRD: 38 ML/MIN/{1.73_M2}
GLUCOSE SERPL-MCNC: 79 MG/DL (ref 70–99)
HCT VFR BLD AUTO: 27 % (ref 40–53)
HGB BLD-MCNC: 8.6 G/DL (ref 13.3–17.7)
IMM GRANULOCYTES # BLD: 0 10E9/L (ref 0–0.4)
IMM GRANULOCYTES NFR BLD: 0.2 %
LYMPHOCYTES # BLD AUTO: 0.7 10E9/L (ref 0.8–5.3)
LYMPHOCYTES NFR BLD AUTO: 16.5 %
MCH RBC QN AUTO: 30.7 PG (ref 26.5–33)
MCHC RBC AUTO-ENTMCNC: 31.9 G/DL (ref 31.5–36.5)
MCV RBC AUTO: 96 FL (ref 78–100)
MONOCYTES # BLD AUTO: 0.7 10E9/L (ref 0–1.3)
MONOCYTES NFR BLD AUTO: 16.7 %
NEUTROPHILS # BLD AUTO: 2.4 10E9/L (ref 1.6–8.3)
NEUTROPHILS NFR BLD AUTO: 59.1 %
NRBC # BLD AUTO: 0 10*3/UL
NRBC BLD AUTO-RTO: 0 /100
PLATELET # BLD AUTO: 189 10E9/L (ref 150–450)
POTASSIUM SERPL-SCNC: 4.1 MMOL/L (ref 3.4–5.3)
RBC # BLD AUTO: 2.8 10E12/L (ref 4.4–5.9)
SODIUM SERPL-SCNC: 136 MMOL/L (ref 133–144)
WBC # BLD AUTO: 4.1 10E9/L (ref 4–11)

## 2019-04-03 ENCOUNTER — NURSING HOME VISIT (OUTPATIENT)
Dept: GERIATRICS | Facility: CLINIC | Age: 74
End: 2019-04-03
Payer: MEDICARE

## 2019-04-03 VITALS
HEIGHT: 72 IN | RESPIRATION RATE: 18 BRPM | TEMPERATURE: 97.6 F | SYSTOLIC BLOOD PRESSURE: 110 MMHG | DIASTOLIC BLOOD PRESSURE: 61 MMHG | BODY MASS INDEX: 33.64 KG/M2 | HEART RATE: 57 BPM | OXYGEN SATURATION: 94 % | WEIGHT: 248.4 LBS

## 2019-04-03 DIAGNOSIS — I50.22 CHRONIC SYSTOLIC CONGESTIVE HEART FAILURE (H): ICD-10-CM

## 2019-04-03 DIAGNOSIS — I25.5 ISCHEMIC CARDIOMYOPATHY: ICD-10-CM

## 2019-04-03 DIAGNOSIS — D64.9 ANEMIA, UNSPECIFIED TYPE: ICD-10-CM

## 2019-04-03 DIAGNOSIS — R78.81 MSSA BACTEREMIA: ICD-10-CM

## 2019-04-03 DIAGNOSIS — S80.12XD HEMATOMA OF LEFT LOWER EXTREMITY, SUBSEQUENT ENCOUNTER: ICD-10-CM

## 2019-04-03 DIAGNOSIS — B95.61 MSSA BACTEREMIA: ICD-10-CM

## 2019-04-03 DIAGNOSIS — I10 ESSENTIAL HYPERTENSION: ICD-10-CM

## 2019-04-03 DIAGNOSIS — R53.81 PHYSICAL DECONDITIONING: ICD-10-CM

## 2019-04-03 DIAGNOSIS — N18.30 CKD (CHRONIC KIDNEY DISEASE) STAGE 3, GFR 30-59 ML/MIN (H): ICD-10-CM

## 2019-04-03 DIAGNOSIS — K70.31 ALCOHOLIC CIRRHOSIS OF LIVER WITH ASCITES (H): Primary | ICD-10-CM

## 2019-04-03 DIAGNOSIS — Z86.711 HISTORY OF PULMONARY EMBOLISM: ICD-10-CM

## 2019-04-03 DIAGNOSIS — M79.622 PAIN OF LEFT UPPER ARM: ICD-10-CM

## 2019-04-03 DIAGNOSIS — I48.0 PAROXYSMAL ATRIAL FIBRILLATION (H): ICD-10-CM

## 2019-04-03 PROCEDURE — 99207 ZZC CDG-MDM COMPONENT: MEETS LOW - DOWN CODED: CPT | Performed by: NURSE PRACTITIONER

## 2019-04-03 PROCEDURE — 99309 SBSQ NF CARE MODERATE MDM 30: CPT | Performed by: NURSE PRACTITIONER

## 2019-04-03 ASSESSMENT — MIFFLIN-ST. JEOR: SCORE: 1894.74

## 2019-04-03 NOTE — LETTER
4/3/2019        RE: Antonio Ramirez  [address]  [place] [address]        [facility]  [place] [facility] Record Number:  [id number]  [facility] where [facility] took place[facility] [[id number]]  Chief Complaint   Patient presents with     RECHECK       HPI:    Antonio Ramirez  is a 74 year old (1945), who is being seen today for an episodic care visit.  HPI information obtained from: facility chart records, facility staff, patient report and [facility] chart review.   He came to this facility 3/14/2019 for short term rehab and medical management following hospitalization with LLE pain and hematoma after a mechanical fall. Complex medical history including alcoholic liver cirrhosis, CAD, HTN, afib, CHF, antiphospholipid Ab, chronic anticoagulation with Xarelto, history of PE and DVT with IVC filter, history of MSSA abscess of left  stump in 12/2018.   INR was 7.45 in the ED and vitamin K was given. Hgb ws 6.3 and he was transfused. CT left hip showed muscular hematomas, no fracture. MRI lumbar spine with multiple level degenerative disc disease with severe stenosis.   He became febrile and was found to have MSSA bacteremia of unclear source. ID consulted and he discharged on IV Ancef for 22 days. ESPERANZA negative for valvular vegetation. He underwent paracentesis 3/11/2019. GI consulted and EGD was negative for varices. Xarelto was held and resumed at discharge.       Today's concern is:     Alcoholic cirrhosis of liver with ascites (H)-increased ascites over the past few days. Uncomfortable, but not painful. Fatigued with poor activity tolerance.   MSSA bacteremia-completed IV Ancef yesterday and PICC pulled.   Chronic systolic congestive heart failure (H)-torsemide was decreased 3/26 due to rise in creatinine to 1.88. Recorded weights are up 15-20 lbs, but variable and possibly not accurate. Mild shortness of breath with exertion.   Ischemic  cardiomyopathy  Paroxysmal atrial fibrillation (H)-HR: 61-80  Xarelto has been on hold due to bleeding at IV sites and anemia.   History of pulmonary embolism  CKD (chronic kidney disease) stage 3, GFR 30-59 ml/min (H)  Hematoma of left lower extremity, subsequent encounter-reports pain has improved  Pain of left upper arm-continues to have edema from IV infiltration, reports pain has improved. Doppler US was negative.   Essential hypertension-BPs: 108/57, 108/65, 132/71  Anemia, unspecified type  Physical deconditioning-ambulating up to 40 ft with walker and contact guard assist. Requires max assist with cares.   SLUMS 21/30, CPT 4.8/5.6    Past Medical and Surgical History reviewed in Epic today.    MEDICATIONS:  Current Outpatient Medications   Medication Sig Dispense Refill     carvedilol (COREG) 3.125 MG tablet TAKE 1 TABLET(3.125 MG) BY MOUTH TWICE DAILY WITH MEALS 180 tablet 0     cyanocobalamin (VITAMIN B-12) 500 MCG SUBL sublingual tablet Place 2 tablets (1,000 mcg) under the tongue daily       fluticasone (FLONASE) 50 MCG/ACT nasal spray Spray 1 spray into both nostrils daily as needed       folic acid (FOLVITE) 1 MG tablet Take 1 tablet (1 mg) by mouth daily       gabapentin (NEURONTIN) 600 MG tablet Take 1 tablet (600 mg) by mouth 2 times daily 60 tablet 11     HYDROcodone-acetaminophen (NORCO) 5-325 MG tablet Take 1 tablet by mouth every 6 hours as needed for pain (for left arm pain/LUE)       hydrocortisone (CORTAID) 1 % external cream Apply topically 2 times daily as needed       ipratropium (ATROVENT) 0.06 % nasal spray Spray 2 sprays into both nostrils 4 times daily       melatonin 3 MG tablet Take 1 tablet (3 mg) by mouth nightly as needed for sleep       miconazole (MICATIN/MICRO GUARD) 2 % external powder Apply topically every hour as needed for other (topical candidiasis)       multivitamin, therapeutic (THERA-VIT) TABS tablet Take 1 tablet by mouth daily       polyethylene glycol  (MIRALAX/GLYCOLAX) packet Take 17 g by mouth daily as needed for constipation       ranitidine (ZANTAC) 150 MG tablet Take 1 tablet (150 mg) by mouth daily       rivaroxaban ANTICOAGULANT (XARELTO) 20 MG TABS tablet Take 20 mg by mouth daily (with dinner)       senna-docusate (SENOKOT-S/PERICOLACE) 8.6-50 MG tablet Take 1 tablet by mouth 2 times daily Hold for loose stools.       torsemide (DEMADEX) 20 MG tablet Take 20 mg by mouth 2 times daily        vitamin B1 (THIAMINE) 100 MG tablet Take 1 tablet (100 mg) by mouth daily         REVIEW OF SYSTEMS:  10 point ROS of systems including Constitutional, Eyes, Respiratory, Cardiovascular, Gastroenterology, Genitourinary, Integumentary, Musculoskeletal, Psychiatric were all negative except for pertinent positives noted in my HPI.    Objective:  /61   Pulse 57   Temp 97.6  F (36.4  C)   Resp 18   Ht 1.829 m (6')   Wt 112.7 kg (248 lb 6.4 oz)   SpO2 94%   BMI 33.69 kg/m     Exam:  GENERAL APPEARANCE:  Alert, in no distress  ENT:  Mouth and posterior oropharynx normal, moist mucous membranes, normal hearing acuity  EYES:  EOM normal, conjunctiva and lids normal  NECK:  No adenopathy,masses or thyromegaly  RESP:  respiratory effort and palpation of chest normal, no respiratory distress, diminished breath sounds bilaterally, no crackles or wheezes  CV:   regular rate and rhythm, 3/6 murmur,  1+ edema RLE with +pedal pulses  ABDOMEN:  moderate ascites, soft, non-tender, no distension, no masses  M/S:   in bed. Wearing LLE prosthesis.Good strength of extremities. Moderate LUE edema with fading hematoma, no warmth or erythema, full ROM  SKIN:  no rashes or open areas  PSYCH:  oriented X 3, memory impaired, affect normal      Labs:   Labs done in SNF are in [place] EPIC. Please refer to them using [facility] Everywhere.    ASSESSMENT / PLAN:  (K70.31) Alcoholic cirrhosis of liver with ascites (H)  (primary encounter diagnosis)  Comment: increased ascites.   Plan:  schedule paracentesis. Increase torsemide as below. Continue thiamine, MVI, folate.     (R78.81) MSSA bacteremia  Comment: appears resolved. Completed Ancef yesterday  Plan: repeat blood cultures 4/8/2019 with results to ID    (I50.22) Chronic systolic congestive heart failure (H)  (I25.5) Ischemic cardiomyopathy  Comment: volume up after torsemide was decreased due to renal function  Plan: increase torsemide back to 20 mg bid. Continue carvedilol. Daily weights.     (I48.0) Paroxysmal atrial fibrillation (H)  (Z86.711) History of pulmonary embolism  Comment: rate controlled. Xarelto has been on hold due to bleeding at IV sites.   Plan: continue carvedilol. Hold Xarelto for paracentesis, then resume.     (N18.3) CKD (chronic kidney disease) stage 3, GFR 30-59 ml/min (H)  Comment: creatine slightly improved  Plan: BMP. Avoid nephrotoxins.     (S80.12XD) Hematoma of left lower extremity, subsequent encounter  Comment: improved  Plan: continue Norco. Continue gabapentin for chronic neuropathic pain    (M79.622) Pain of left upper arm  Comment: pain and edema resolving  Plan: monitor    (I10) Essential hypertension  Comment: controlled. Mild hypotension at times, asymptomatic  Plan: increase torsemide as above. Continue carvedilol for rate control. Monitor VS    (D64.9) Anemia, unspecified type  Comment: Hgb improved at 8.6  Plan: follow Hgb    (R53.81) Physical deconditioning  Comment: slowly progressing in therapies  Plan: continue PHYSICAL THERAPY/OT. Goal is to return home with his wife.         Total time spent with patient visit at the skilled nursing facility was 38 mins including patient visit and review of past records. Greater than 50% of total time spent with counseling and coordinating care due to complexity of care  Electronically signed by:  DIPAK Kennedy CNP            Sincerely,        DIPAK Kennedy CNP

## 2019-04-03 NOTE — PROGRESS NOTES
[facility]  [facility] Record Number:  [id number]  Place of Service where encounter took place:  [facility] ([facility] [[id number]]  Chief Complaint   Patient presents with     RECHECK       HPI:    Antonio Ramirez  is a 74 year old (1945), who is being seen today for an episodic care visit.  HPI information obtained from: facility chart records, facility staff, patient report and [facility] chart review.   He came to this facility 3/14/2019 for short term rehab and medical management following hospitalization with LLE pain and hematoma after a mechanical fall. Complex medical history including alcoholic liver cirrhosis, CAD, HTN, afib, CHF, antiphospholipid Ab, chronic anticoagulation with Xarelto, history of PE and DVT with IVC filter, history of MSSA abscess of left  stump in 12/2018.   INR was 7.45 in the ED and vitamin K was given. Hgb ws 6.3 and he was transfused. CT left hip showed muscular hematomas, no fracture. MRI lumbar spine with multiple level degenerative disc disease with severe stenosis.   He became febrile and was found to have MSSA bacteremia of unclear source. ID consulted and he discharged on IV Ancef for 22 days. ESPERANZA negative for valvular vegetation. He underwent paracentesis 3/11/2019. GI consulted and EGD was negative for varices. Xarelto was held and resumed at discharge.       Today's concern is:     Alcoholic cirrhosis of liver with ascites (H)-increased ascites over the past few days. Uncomfortable, but not painful. Fatigued with poor activity tolerance.   MSSA bacteremia-completed IV Ancef yesterday and PICC pulled.   Chronic systolic congestive heart failure (H)-torsemide was decreased 3/26 due to rise in creatinine to 1.88. Recorded weights are up 15-20 lbs, but variable and possibly not accurate. Mild shortness of breath with exertion.   Ischemic cardiomyopathy  Paroxysmal atrial fibrillation (H)-HR: 61-80  Xarelto has been on hold due to  bleeding at IV sites and anemia.   History of pulmonary embolism  CKD (chronic kidney disease) stage 3, GFR 30-59 ml/min (H)  Hematoma of left lower extremity, subsequent encounter-reports pain has improved  Pain of left upper arm-continues to have edema from IV infiltration, reports pain has improved. Doppler US was negative.   Essential hypertension-BPs: 108/57, 108/65, 132/71  Anemia, unspecified type  Physical deconditioning-ambulating up to 40 ft with walker and contact guard assist. Requires max assist with cares.   SLUMS 21/30, CPT 4.8/5.6    Past Medical and Surgical History reviewed in Epic today.    MEDICATIONS:  Current Outpatient Medications   Medication Sig Dispense Refill     carvedilol (COREG) 3.125 MG tablet TAKE 1 TABLET(3.125 MG) BY MOUTH TWICE DAILY WITH MEALS 180 tablet 0     cyanocobalamin (VITAMIN B-12) 500 MCG SUBL sublingual tablet Place 2 tablets (1,000 mcg) under the tongue daily       fluticasone (FLONASE) 50 MCG/ACT nasal spray Spray 1 spray into both nostrils daily as needed       folic acid (FOLVITE) 1 MG tablet Take 1 tablet (1 mg) by mouth daily       gabapentin (NEURONTIN) 600 MG tablet Take 1 tablet (600 mg) by mouth 2 times daily 60 tablet 11     HYDROcodone-acetaminophen (NORCO) 5-325 MG tablet Take 1 tablet by mouth every 6 hours as needed for pain (for left arm pain/LUE)       hydrocortisone (CORTAID) 1 % external cream Apply topically 2 times daily as needed       ipratropium (ATROVENT) 0.06 % nasal spray Spray 2 sprays into both nostrils 4 times daily       melatonin 3 MG tablet Take 1 tablet (3 mg) by mouth nightly as needed for sleep       miconazole (MICATIN/MICRO GUARD) 2 % external powder Apply topically every hour as needed for other (topical candidiasis)       multivitamin, therapeutic (THERA-VIT) TABS tablet Take 1 tablet by mouth daily       polyethylene glycol (MIRALAX/GLYCOLAX) packet Take 17 g by mouth daily as needed for constipation       ranitidine (ZANTAC) 150  MG tablet Take 1 tablet (150 mg) by mouth daily       rivaroxaban ANTICOAGULANT (XARELTO) 20 MG TABS tablet Take 20 mg by mouth daily (with dinner)       senna-docusate (SENOKOT-S/PERICOLACE) 8.6-50 MG tablet Take 1 tablet by mouth 2 times daily Hold for loose stools.       torsemide (DEMADEX) 20 MG tablet Take 20 mg by mouth 2 times daily        vitamin B1 (THIAMINE) 100 MG tablet Take 1 tablet (100 mg) by mouth daily         REVIEW OF SYSTEMS:  10 point ROS of systems including Constitutional, Eyes, Respiratory, Cardiovascular, Gastroenterology, Genitourinary, Integumentary, Musculoskeletal, Psychiatric were all negative except for pertinent positives noted in my HPI.    Objective:  /61   Pulse 57   Temp 97.6  F (36.4  C)   Resp 18   Ht 1.829 m (6')   Wt 112.7 kg (248 lb 6.4 oz)   SpO2 94%   BMI 33.69 kg/m    Exam:  GENERAL APPEARANCE:  Alert, in no distress  ENT:  Mouth and posterior oropharynx normal, moist mucous membranes, normal hearing acuity  EYES:  EOM normal, conjunctiva and lids normal  NECK:  No adenopathy,masses or thyromegaly  RESP:  respiratory effort and palpation of chest normal, no respiratory distress, diminished breath sounds bilaterally, no crackles or wheezes  CV:   regular rate and rhythm, 3/6 murmur,  1+ edema RLE with +pedal pulses  ABDOMEN:  moderate ascites, soft, non-tender, no distension, no masses  M/S:   in bed. Wearing LLE prosthesis.Good strength of extremities. Moderate LUE edema with fading hematoma, no warmth or erythema, full ROM  SKIN:  no rashes or open areas  PSYCH:  oriented X 3, memory impaired, affect normal      Labs:   Labs done in SNF are in [place] EPIC. Please refer to them using [facility] Everywhere.    ASSESSMENT / PLAN:  (K70.31) Alcoholic cirrhosis of liver with ascites (H)  (primary encounter diagnosis)  Comment: increased ascites.   Plan: schedule paracentesis. Increase torsemide as below. Continue thiamine, MVI, folate.     (R73.81) MSSA  bacteremia  Comment: appears resolved. Completed Ancef yesterday  Plan: repeat blood cultures 4/8/2019 with results to ID    (I50.22) Chronic systolic congestive heart failure (H)  (I25.5) Ischemic cardiomyopathy  Comment: volume up after torsemide was decreased due to renal function  Plan: increase torsemide back to 20 mg bid. Continue carvedilol. Daily weights.     (I48.0) Paroxysmal atrial fibrillation (H)  (Z86.711) History of pulmonary embolism  Comment: rate controlled. Xarelto has been on hold due to bleeding at IV sites.   Plan: continue carvedilol. Hold Xarelto for paracentesis, then resume.     (N18.3) CKD (chronic kidney disease) stage 3, GFR 30-59 ml/min (H)  Comment: creatine slightly improved  Plan: BMP. Avoid nephrotoxins.     (S80.12XD) Hematoma of left lower extremity, subsequent encounter  Comment: improved  Plan: continue Norco. Continue gabapentin for chronic neuropathic pain    (M79.622) Pain of left upper arm  Comment: pain and edema resolving  Plan: monitor    (I10) Essential hypertension  Comment: controlled. Mild hypotension at times, asymptomatic  Plan: increase torsemide as above. Continue carvedilol for rate control. Monitor VS    (D64.9) Anemia, unspecified type  Comment: Hgb improved at 8.6  Plan: follow Hgb    (R53.81) Physical deconditioning  Comment: slowly progressing in therapies  Plan: continue PHYSICAL THERAPY/OT. Goal is to return home with his wife.         Total time spent with patient visit at the skilled nursing facility was 38 mins including patient visit and review of past records. Greater than 50% of total time spent with counseling and coordinating care due to complexity of care  Electronically signed by:  DIPAK Kennedy CNP

## 2019-04-05 ENCOUNTER — HOSPITAL ENCOUNTER (OUTPATIENT)
Dept: ULTRASOUND IMAGING | Facility: CLINIC | Age: 74
End: 2019-04-05
Attending: NURSE PRACTITIONER
Payer: MEDICARE

## 2019-04-05 ENCOUNTER — HOSPITAL LABORATORY (OUTPATIENT)
Dept: OTHER | Facility: CLINIC | Age: 74
End: 2019-04-05

## 2019-04-05 ENCOUNTER — HOSPITAL ENCOUNTER (OUTPATIENT)
Facility: CLINIC | Age: 74
Discharge: HOME | End: 2019-04-05
Admitting: RADIOLOGY
Payer: MEDICARE

## 2019-04-05 VITALS
RESPIRATION RATE: 18 BRPM | DIASTOLIC BLOOD PRESSURE: 48 MMHG | TEMPERATURE: 96 F | HEART RATE: 54 BPM | OXYGEN SATURATION: 98 % | SYSTOLIC BLOOD PRESSURE: 112 MMHG

## 2019-04-05 DIAGNOSIS — K70.31 ALCOHOLIC CIRRHOSIS OF LIVER WITH ASCITES (H): ICD-10-CM

## 2019-04-05 LAB
ANION GAP SERPL CALCULATED.3IONS-SCNC: 7 MMOL/L (ref 3–14)
BUN SERPL-MCNC: 37 MG/DL (ref 7–30)
CALCIUM SERPL-MCNC: 8.5 MG/DL (ref 8.5–10.1)
CHLORIDE SERPL-SCNC: 101 MMOL/L (ref 94–109)
CO2 SERPL-SCNC: 29 MMOL/L (ref 20–32)
CREAT SERPL-MCNC: 1.82 MG/DL (ref 0.66–1.25)
GFR SERPL CREATININE-BSD FRML MDRD: 35 ML/MIN/{1.73_M2}
GLUCOSE SERPL-MCNC: 106 MG/DL (ref 70–99)
POTASSIUM SERPL-SCNC: 4.2 MMOL/L (ref 3.4–5.3)
SODIUM SERPL-SCNC: 137 MMOL/L (ref 133–144)

## 2019-04-05 PROCEDURE — 40000863 ZZH STATISTIC RADIOLOGY XRAY, US, CT, MAR, NM

## 2019-04-05 PROCEDURE — 25000125 ZZHC RX 250: Performed by: RADIOLOGY

## 2019-04-05 PROCEDURE — 27210190 US PARACENTESIS

## 2019-04-05 RX ORDER — DEXTROSE MONOHYDRATE 25 G/50ML
25-50 INJECTION, SOLUTION INTRAVENOUS
Status: DISCONTINUED | OUTPATIENT
Start: 2019-04-05 | End: 2019-04-05 | Stop reason: HOSPADM

## 2019-04-05 RX ORDER — NICOTINE POLACRILEX 4 MG
15-30 LOZENGE BUCCAL
Status: CANCELLED | OUTPATIENT
Start: 2019-04-05

## 2019-04-05 RX ORDER — DEXTROSE MONOHYDRATE 25 G/50ML
25-50 INJECTION, SOLUTION INTRAVENOUS
Status: CANCELLED | OUTPATIENT
Start: 2019-04-05

## 2019-04-05 RX ORDER — NICOTINE POLACRILEX 4 MG
15-30 LOZENGE BUCCAL
Status: DISCONTINUED | OUTPATIENT
Start: 2019-04-05 | End: 2019-04-05 | Stop reason: HOSPADM

## 2019-04-05 RX ORDER — LIDOCAINE HYDROCHLORIDE 10 MG/ML
10 INJECTION, SOLUTION EPIDURAL; INFILTRATION; INTRACAUDAL; PERINEURAL ONCE
Status: COMPLETED | OUTPATIENT
Start: 2019-04-05 | End: 2019-04-05

## 2019-04-05 RX ORDER — LIDOCAINE 40 MG/G
CREAM TOPICAL
Status: DISCONTINUED | OUTPATIENT
Start: 2019-04-05 | End: 2019-04-05 | Stop reason: HOSPADM

## 2019-04-05 RX ADMIN — LIDOCAINE HYDROCHLORIDE 10 ML: 10 INJECTION, SOLUTION EPIDURAL; INFILTRATION; INTRACAUDAL; PERINEURAL at 12:55

## 2019-04-05 NOTE — IR NOTE
RADIOLOGY POST PROCEDURE NOTE    Patient name: Antonio Ramirez  MRN: [id number]  : 1945    Pre-procedure diagnosis: ascites  Post-procedure diagnosis: Same    Procedure Date/Time: 2019  1:53 PM  Procedure: paracentesis  Estimated blood loss: None  Specimen(s) collected with description: none    The patient tolerated the procedure well with no immediate complications.  Significant findings:none    See imaging dictation for procedural details.    Provider name: Danny Serrano  Assistant(s):None

## 2019-04-05 NOTE — DISCHARGE INSTRUCTIONS

## 2019-04-05 NOTE — PROGRESS NOTES
1313 para explained to pt, consented and time out done per dr francisco. Pt tolerated para very well. Drained total of 4200 ml dark dalia fluid drained from right lower abdomen, site is CDI with bandaid to site. Denies pain. VSS. Back to room.  1325 pt to room. No pain. VSS. Site CDI. Pt given meal and juice.   1338 denies pain. Para site right lower abdomen is CDI with bandaid.  Pt had meal and beverage. Pt assisted to wheel chair and [facility] called for transport of pt back to [facility] via wheel chair.

## 2019-04-05 NOTE — PROGRESS NOTES
Admission complete.  Discharge instructions reviewed and questions answered.  Pt ready for his procedure.  When pt ready for discharge will need at ride back to [facility] [facility] with their transport number they provided.

## 2019-04-08 ENCOUNTER — NURSING HOME VISIT (OUTPATIENT)
Dept: GERIATRICS | Facility: CLINIC | Age: 74
End: 2019-04-08
Payer: MEDICARE

## 2019-04-08 ENCOUNTER — HOSPITAL LABORATORY (OUTPATIENT)
Dept: OTHER | Facility: CLINIC | Age: 74
End: 2019-04-08

## 2019-04-08 VITALS
HEART RATE: 53 BPM | SYSTOLIC BLOOD PRESSURE: 112 MMHG | BODY MASS INDEX: 33.36 KG/M2 | OXYGEN SATURATION: 97 % | RESPIRATION RATE: 19 BRPM | WEIGHT: 246 LBS | TEMPERATURE: 98.6 F | DIASTOLIC BLOOD PRESSURE: 62 MMHG

## 2019-04-08 VITALS
HEIGHT: 72 IN | DIASTOLIC BLOOD PRESSURE: 62 MMHG | WEIGHT: 246 LBS | BODY MASS INDEX: 33.32 KG/M2 | HEART RATE: 53 BPM | SYSTOLIC BLOOD PRESSURE: 112 MMHG | OXYGEN SATURATION: 97 % | RESPIRATION RATE: 18 BRPM | TEMPERATURE: 98.6 F

## 2019-04-08 DIAGNOSIS — M79.622 PAIN OF LEFT UPPER ARM: ICD-10-CM

## 2019-04-08 DIAGNOSIS — S80.12XD HEMATOMA OF LEFT LOWER EXTREMITY, SUBSEQUENT ENCOUNTER: ICD-10-CM

## 2019-04-08 DIAGNOSIS — I25.5 ISCHEMIC CARDIOMYOPATHY: ICD-10-CM

## 2019-04-08 DIAGNOSIS — Z86.711 HISTORY OF PULMONARY EMBOLISM: ICD-10-CM

## 2019-04-08 DIAGNOSIS — R53.81 PHYSICAL DECONDITIONING: ICD-10-CM

## 2019-04-08 DIAGNOSIS — D64.9 ANEMIA, UNSPECIFIED TYPE: ICD-10-CM

## 2019-04-08 DIAGNOSIS — B95.61 MSSA BACTEREMIA: ICD-10-CM

## 2019-04-08 DIAGNOSIS — I48.0 PAROXYSMAL ATRIAL FIBRILLATION (H): ICD-10-CM

## 2019-04-08 DIAGNOSIS — R78.81 MSSA BACTEREMIA: ICD-10-CM

## 2019-04-08 DIAGNOSIS — K70.31 ALCOHOLIC CIRRHOSIS OF LIVER WITH ASCITES (H): Primary | ICD-10-CM

## 2019-04-08 DIAGNOSIS — I50.22 CHRONIC SYSTOLIC CONGESTIVE HEART FAILURE (H): ICD-10-CM

## 2019-04-08 DIAGNOSIS — N18.30 CKD (CHRONIC KIDNEY DISEASE) STAGE 3, GFR 30-59 ML/MIN (H): ICD-10-CM

## 2019-04-08 DIAGNOSIS — I10 ESSENTIAL HYPERTENSION: ICD-10-CM

## 2019-04-08 LAB
ANION GAP SERPL CALCULATED.3IONS-SCNC: 6 MMOL/L (ref 3–14)
BUN SERPL-MCNC: 41 MG/DL (ref 7–30)
CALCIUM SERPL-MCNC: 8.4 MG/DL (ref 8.5–10.1)
CHLORIDE SERPL-SCNC: 98 MMOL/L (ref 94–109)
CO2 SERPL-SCNC: 30 MMOL/L (ref 20–32)
CREAT SERPL-MCNC: 1.98 MG/DL (ref 0.66–1.25)
GFR SERPL CREATININE-BSD FRML MDRD: 32 ML/MIN/{1.73_M2}
GLUCOSE SERPL-MCNC: 113 MG/DL (ref 70–99)
POTASSIUM SERPL-SCNC: 4.5 MMOL/L (ref 3.4–5.3)
SODIUM SERPL-SCNC: 134 MMOL/L (ref 133–144)

## 2019-04-08 PROCEDURE — 99309 SBSQ NF CARE MODERATE MDM 30: CPT | Performed by: NURSE PRACTITIONER

## 2019-04-08 PROCEDURE — 99207 ZZC CDG-MDM COMPONENT: MEETS LOW - DOWN CODED: CPT | Performed by: NURSE PRACTITIONER

## 2019-04-08 ASSESSMENT — MIFFLIN-ST. JEOR: SCORE: 1883.85

## 2019-04-08 NOTE — PROGRESS NOTES
"Antonio Ramirez is a 74 year old male seen April 9, 2019 at [facility] where he was admitted last month after a fall, seen to follow up persistent volume overload despite treatment with torsemide.        Patient is seen in his room, resting abed.   Biggest concern is worsening scrotal edema that is uncomfortable.   Diuretics have been increased, but size of scrotum is same or bigger.      He had paracentesis on Friday 4/5 removing about 4 L; paracentesis before that was 3/11 removing 3L.     At March hospitalization he was found to have MSSA bacteremia of unclear etiology.  He was seen by ID and recommended to receive 22 days of IV Ancef which finished up on 4/2.  No valvular vegetation seen on ESPERANZA.     Pt had December 2018 hospitalization and TCU stay for left stump MSSA abscess.  He had I&D with BKA stump revision.   He made progress with therapies and discharged home.   While at home he gradually became weaker, seen in ED twice in February for weakness and fall related pain.      Ongoing medical issues include PAF with anticoagulation, antiphospholipid Ab, CAD/CHF and alcoholic cirrhosis.       EGD ruled out esophageal varices, which was done prior to ESPERANZA.        Pt has had many years of alcohol abuse, with secondary cirrhosis, has had multiple paracenteses in the past, reports he had one about q2-3 weeks when he was in [place] for the winter.      Today reports his liver has always been able to recover in the past when he stopped drinking for a while, wonders why this isn't the case now.   Was drinking up until March admission, \"but not as much\"        He also has CAD with ischemic cardiomyopathy and systolic CHF with low EF contributing to volume excess.   Recent increases of diuretic has resulted in Creatinine bump        Past Medical History:   Diagnosis Date     Alcoholism (H)      Amputated left leg (H)      Anemia      Anticardiolipin antibody syndrome (H)      Antiphospholipid antibody syndrome (H)      " Antiplatelet or antithrombotic long-term use      Aortic root dilatation (H)      Aortic stenosis      Arthritis      Balance problem      Coronary artery disease     CABG 2007: SVG to LAD, SVG to diagonal, SVG to OM; cardiac cath 5/17/18: thrombectomy for restenosis of stents-sent for urgent CABG, cath 5/14/2007: GRECIA x2 to LAD, Grecia to diagonal     Gastro-oesophageal reflux disease      Heart attack (H) 2007    apparently arrested, cardiac X5     Hiatal hernia      Hypercholesterolemia      Hypertension      Ischemic cardiomyopathy      Left foot drop      Liver disease     alcoholic liver disease, alcoholic cirrohsosis, portal hypertension     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)     ?When diagnosed, patient not aware of this     Moderate mitral regurgitation      Monoclonal gammopathy      Neuropathy      Noninfectious ileitis     diverticulitis of colon     Nonrheumatic tricuspid valve regurgitation      Paroxysmal atrial fibrillation (H)      PE (pulmonary embolism) 2006    saddle emboli 2006     Prostate cancer (H) 2000    Treated with radiation (seed implants in 2000) -- no problems since     Pulmonary hypertension (H)      Renal disease     kidney stones     Syncope      Thrombocytopenia (H)      Urethral stricture      Venous thrombosis     IVC filter and lysis procedures 2007       Past Surgical History:   Procedure Laterality Date     AMPUTATE LEG BELOW KNEE Left 04/2014    related to gangrene, started as foot ulcer related to neuropathy     AMPUTATE LEG BELOW KNEE Left 12/4/2017    Procedure: AMPUTATE LEG BELOW KNEE;  Exploration of Left Leg Below Knee Amputation Stump with Ligation of Bleeders.;  Surgeon: Leandro Arreola MD;  Location:  OR     APPENDECTOMY       APPLY WOUND VAC Left 12/6/2017    Procedure: APPLY WOUND VAC;;  Surgeon: Saima Howard MD;  Location:  SD     ARTHROPLASTY HIP Right 11/27/2018    Procedure: RIGHT TOTAL HIP ARTHROPLASTY;  Surgeon: Saima Howard MD;   Location:  OR     ARTHROPLASTY KNEE Right 9/19/2014    Procedure: ARTHROPLASTY KNEE;  Surgeon: Saima Howard MD;  Location:  OR     CARDIAC SURGERY      CABG     CHOLECYSTECTOMY       COLONOSCOPY       COMBINED CYSTOSCOPY, RETROGRADES, EXCHANGE STENT URETER(S) Left 7/24/2018    Procedure: COMBINED CYSTOSCOPY, RETROGRADES, EXCHANGE STENT URETER(S);  CYSTOSCOPY, BILATERAL RETROGRADES, BILATERAL URETERAL STENT EXCHANGE ;  Surgeon: Matt Cervantes MD;  Location:  OR     CRANIOTOMY Right 4/7/2018    Procedure: CRANIOTOMY;  Right Craniotomy For Subdural Hematoma;  Surgeon: Jono Issa MD;  Location:  OR     CYSTOSCOPY, DILATE URETHRA, COMBINED N/A 10/12/2016    Procedure: COMBINED CYSTOSCOPY, DILATE URETHRA;  Surgeon: Dimitry Blelo MD;  Location:  OR     CYSTOSCOPY, REMOVE STENT(S), COMBINED Right 7/24/2018    Procedure: COMBINED CYSTOSCOPY, REMOVE STENT(S);;  Surgeon: Jose Hunter MD;  Location:  OR     ENDOSCOPIC STRIPPING VEIN(S)       esophagogastroduodenoscopy       ESOPHAGOSCOPY, GASTROSCOPY, DUODENOSCOPY (EGD), COMBINED N/A 3/11/2019    Procedure: COMBINED ESOPHAGOSCOPY, GASTROSCOPY, DUODENOSCOPY (EGD), BIOPSY SINGLE OR MULTIPLE;  Surgeon: Katherin Boyd MD;  Location:  GI     EYE SURGERY      cataracts     GENITOURINARY SURGERY      Prostate Seen Implants     HERNIA REPAIR      Umbilical     INCISION AND DRAINAGE LOWER EXTREMITY, COMBINED Left 12/1/2017    Procedure: COMBINED INCISION AND DRAINAGE LOWER EXTREMITY;  INCISION AND DRAINAGE OF LEFT BELOW KNEE AMPUTATION ABSCESS, WOUND VAC PLACEMENT;  Surgeon: Saima Howard MD;  Location:  OR     IR IVC FILTER PLACEMENT       IRRIGATION AND DEBRIDEMENT LOWER EXTREMITY, COMBINED Left 12/6/2017    Procedure: COMBINED IRRIGATION AND DEBRIDEMENT LOWER EXTREMITY;  IRRIGATION AND DEBRIDEMENT OF LEFT BELOW KNEE AMPUTATION SITE WITH WOUND VAC REPLACEMENT;  Surgeon: Saima Howard MD;  Location:   SD     IRRIGATION AND DEBRIDEMENT LOWER EXTREMITY, COMBINED Left 12/8/2017    Procedure: COMBINED IRRIGATION AND DEBRIDEMENT LOWER EXTREMITY;  IRRIGATION AND DEBRIDEMENT OF LEFT BELOW THE KNEE AMPUTATION AND SHORTENING OF THE TIBIA WITH WOUND CLOSURE;  Surgeon: Saima Howard MD;  Location:  OR     LASER HOLMIUM LITHOTRIPSY URETER(S), INSERT STENT, COMBINED Bilateral 6/28/2018    Procedure: COMBINED CYSTOSCOPY, URETEROSCOPY, LASER HOLMIUM LITHOTRIPSY URETER(S), INSERT STENT;  CYSTOSCOPY, BILATERAL URETEROSCOPY,  HOLMIUM LASER LITHOTRIPSY, BILATERAL STENT PLACEMENT, STONE BASKETING;  Surgeon: Jose Hunter MD;  Location:  OR     LASER HOLMIUM LITHOTRIPSY URETER(S), INSERT STENT, COMBINED Left 8/14/2018    Procedure: COMBINED CYSTOSCOPY, URETEROSCOPY, LASER HOLMIUM LITHOTRIPSY URETER(S), INSERT STENT;  CYSTOSCOPY, LEFT URETEROSCOPY, LEFT LASER HOLMIUM LITHOTRIPSY URETER(S) REMOVAL BILATERAL STENTS;  Surgeon: Jose Hunter MD;  Location:  OR     ORTHOPEDIC SURGERY      (R) knee scope     ORTHOPEDIC SURGERY      total hip      ORTHOPEDIC SURGERY      elbow surgery     SH:  Lives with his wife of 50 years, Helena, in an apartment in [place].   They have a son and a daughter  Retired     Review Of Systems: reviewed and negative other than above  SLUMS 21/30   CPT 4.8     Ambulates 40' with FWW and CGA, limited by fatigue and dyspnea.   Wt Readings from Last 5 Encounters:   04/08/19 111.6 kg (246 lb)   04/08/19 111.6 kg (246 lb)   04/03/19 112.7 kg (248 lb 6.4 oz)   03/28/19 112.9 kg (248 lb 12.8 oz)   03/25/19 101.2 kg (223 lb)        EXAM:  NAD, pale  /62   Pulse 53   Temp 98.6  F (37  C)   Resp 18   Ht 1.829 m (6')   Wt 111.6 kg (246 lb)   SpO2 97%   BMI 33.36 kg/m     Neck supple without adenopathy  Lungs with decreased BS, few fine rales  Heart RRR s1s2 with 1/6 HSM  Abd with distention, ascites, NT, +BS  Scrotum size of a large grapefruit, mild erythema, no open areas  Ext 2-3+  peripheral edema, left arm with ace wrap in place.   Neuro: some difficulty tracking new information, mild STML  Psych: anxious     Last Comprehensive Metabolic Panel:  Sodium   Date Value Ref Range Status   04/08/2019 134 133 - 144 mmol/L Final     Potassium   Date Value Ref Range Status   04/08/2019 4.5 3.4 - 5.3 mmol/L Final     Chloride   Date Value Ref Range Status   04/08/2019 98 94 - 109 mmol/L Final     Carbon Dioxide   Date Value Ref Range Status   04/08/2019 30 20 - 32 mmol/L Final     Anion Gap   Date Value Ref Range Status   04/08/2019 6 3 - 14 mmol/L Final     Glucose   Date Value Ref Range Status   04/08/2019 113 (H) 70 - 99 mg/dL Final     Urea Nitrogen   Date Value Ref Range Status   04/08/2019 41 (H) 7 - 30 mg/dL Final     Creatinine   Date Value Ref Range Status   04/08/2019 1.98 (H) 0.66 - 1.25 mg/dL Final     GFR Estimate   Date Value Ref Range Status   04/08/2019 32 (L) >60 mL/min/[1.73_m2] Final     Comment:     Non  GFR Calc  Starting 12/18/2018, serum creatinine based estimated GFR (eGFR) will be   calculated using the Chronic Kidney Disease Epidemiology Collaboration   (CKD-EPI) equation.       Calcium   Date Value Ref Range Status   04/08/2019 8.4 (L) 8.5 - 10.1 mg/dL Final     Lab Results   Component Value Date    AST 25 04/01/2019     Lab Results   Component Value Date    ALT 11 02/28/2019      BILITOTAL 2.9 02/28/2019     Lab Results   Component Value Date    ALBUMIN 2.7 02/28/2019     Lab Results   Component Value Date    PROTTOTAL 6.9 02/28/2019      Lab Results   Component Value Date    ALKPHOS 90 02/28/2019     Lab Results   Component Value Date    WBC 4.1 04/01/2019      HGB 8.6 04/01/2019      MCV 96 04/01/2019       04/01/2019        ESPERANZA 3/13/19   Interpretation Summary  1. No evidence of valvular endocarditis.  2. Trileaflet aortic valve with moderate stenosis. Peak transaortic velocity 3.3 m/s, mean gradient 24 mmHg.  3. Moderate mitral regurgitation.  Mechanism is likely functional. 2 jets. The regurgitant orifice area of the larger jet is 0.25 cm2,    regurgitant volume 35 ml.  4. Moderately decreased left ventricular systolic function. Estimated LVEF 35-40%.  5. Regional wall motion abnormalities as specified in recent transthoracic echocardiogram.  6. Bubble study is negative for flow across the atrial septum.       IMP/PLAN:  (R78.81) MSSA bacteremia    Comment: unclear source     Plan: finished course of abx last week   Follow temps, will need to obtain blood cultures if any fever    (Z91.81) H/O fall, recent  (S80.12XD) Hematoma of left lower extremity, subsequent encounter  (G54.6) Phantom limb pain (H)  Comment: LLE pain and swelling.     Plan: pain regimen includes gabapentin, prn oxycodone  Assist with transfers and mobility       (D68.61) Antiphospholipid antibody syndrome (H)  Comment: hypercoagulability, anemia, liver disease  Plan: continue rivaroxaban; follow hgb       (K70.31) Alcoholic cirrhosis of liver with ascites (H)  Comment:   S/p paracentesis 3/11/19 and 4/5   MELD score based on previous labs would be 23  Plan:   He declined Psychiatric referral in the hospital. Continue thiamine, MVI, folate.    Reviewed with patient that scrotal edema is a result of CHF and portal hypertension.     Will add low dose spironolactone 12.5 mg/day and follow bps and lytes.  (will need to watch for hypotension; also had gynecomastia in the past on spironolactone, by chart review, patient does not remember this)     Repeat paracentesis this week.   Referral to GI /hepatologist for ongoing care.       (I50.9) Chronic congestive heart failure, unspecified congestive heart failure type (H)  (N18.3) CKD (chronic kidney disease) stage 3, GFR 30-59 ml/min (H)  Comment: + volume overload   Plan:   Continue current dose of torsemide and carvedilol, daily weights and follow renal function.      (I48.0) Paroxysmal a-fib (H)  Comment: controlled VR on very low dose  carvedilol  Plan: apixaban for stroke prophylaxis.      (D62) Anemia due to blood loss, acute  Comment: and pancytopenia  Plan: follow CBC   He has been followed by Hem/Onc    (M62.81) Generalized muscle weakness  Comment: related to all above   Plan: PHYSICAL THERAPY / OCCUPATIONAL THERAPY for strengthening, transfers, ADLs, gait.     Discharge goal is return home with his wife.       Shelia Watson MD

## 2019-04-08 NOTE — LETTER
4/8/2019        RE: Antonio Ramirez  [address]  [place] [address]        [facility]  [facility] Record Number:  [id number]  [facility] where encounter took place:  [facility] [[id number]]  Chief Complaint   Patient presents with     RECHECK       HPI:    Antonio Ramirez  is a 74 year old (1945), who is being seen today for an episodic care visit.  HPI information obtained from: facility chart records, facility staff, patient report and [facility] chart review and wife report.   He came to this facility 3/14/2019 for short term rehab and medical management following hospitalization with LLE pain and hematoma after a mechanical fall. Complex medical history including alcoholic liver cirrhosis, CAD, HTN, afib, CHF, antiphospholipid Ab, chronic anticoagulation with Xarelto, history of PE and DVT with IVC filter, history of MSSA abscess of left  stump in 12/2018.   INR was 7.45 in the ED and vitamin K was given. Hgb ws 6.3 and he was transfused. CT left hip showed muscular hematomas, no fracture. MRI lumbar spine with multiple level degenerative disc disease with severe stenosis.   He became febrile and was found to have MSSA bacteremia of unclear source. ID consulted and he discharged on IV Ancef for 22 days. ESPERANZA negative for valvular vegetation. He underwent paracentesis 3/11/2019. GI consulted and EGD was negative for varices. Xarelto was held and resumed at discharge.       Today's concern is:     Alcoholic cirrhosis of liver with ascites (H)-underwent paracentesis 4/5/2019 with improvement in ascites. Reports shortness of breath has improved. Scrotal edema has worsened and is uncomfortable. He's very upset about this. Was elevating his scrotum on a towel,which he acknowledges was helpful, but has not done this for the past 1-2 days.   Chronic systolic congestive heart failure (H)-weights are inconsistent as some have been done standing  and some in his wheelchair, sometimes with his prosthesis. Has a salt shaker on his bedside table and is drinking a can of pop.   Ischemic cardiomyopathy  CKD (chronic kidney disease) stage 3, GFR 30-59 ml/min (H)-creatinine up to 1.98 today.   MSSA bacteremia-afebrile. Completed Ancef 4/2/2019  Paroxysmal atrial fibrillation (H)-HR: 53-65   Xarelto had been on hold due to bleeding at IV sites, anemia and paracentesis. Resumed last night. No bleeding noted.   History of pulmonary embolism  Essential hypertension-BPs: 112/62, 99/55, 138/70, 117/73  Denies feeling dizzy or lightheaded.   Pain of left upper arm-edema is less, although he disagrees about this. Reports pain has improved.   Anemia, unspecified type  Hematoma of left lower extremity, subsequent encounter-pain improved  Physical deconditioning-ambulating up to 40 ft with walker and contact guard assist. Requires max assist with cares.   SLUMS 21/30, CPT 4.8/5.6    Past Medical and Surgical History reviewed in Epic today.    MEDICATIONS:  Current Outpatient Medications   Medication Sig Dispense Refill     carvedilol (COREG) 3.125 MG tablet TAKE 1 TABLET(3.125 MG) BY MOUTH TWICE DAILY WITH MEALS 180 tablet 0     cyanocobalamin (VITAMIN B-12) 500 MCG SUBL sublingual tablet Place 2 tablets (1,000 mcg) under the tongue daily       fluticasone (FLONASE) 50 MCG/ACT nasal spray Spray 1 spray into both nostrils daily as needed       folic acid (FOLVITE) 1 MG tablet Take 1 tablet (1 mg) by mouth daily       gabapentin (NEURONTIN) 600 MG tablet Take 1 tablet (600 mg) by mouth 2 times daily 60 tablet 11     HYDROcodone-acetaminophen (NORCO) 5-325 MG tablet Take 1 tablet by mouth every 6 hours as needed for pain (for left arm pain/LUE)       hydrocortisone (CORTAID) 1 % external cream Apply topically 2 times daily as needed       ipratropium (ATROVENT) 0.06 % nasal spray Spray 2 sprays into both nostrils 4 times daily       melatonin 3 MG tablet Take 1 tablet (3 mg) by  mouth nightly as needed for sleep       miconazole (MICATIN/MICRO GUARD) 2 % external powder Apply topically every hour as needed for other (topical candidiasis)       multivitamin, therapeutic (THERA-VIT) TABS tablet Take 1 tablet by mouth daily       polyethylene glycol (MIRALAX/GLYCOLAX) packet Take 17 g by mouth daily as needed for constipation       ranitidine (ZANTAC) 150 MG tablet Take 1 tablet (150 mg) by mouth daily       rivaroxaban ANTICOAGULANT (XARELTO) 20 MG TABS tablet Take 20 mg by mouth daily (with dinner)       senna-docusate (SENOKOT-S/PERICOLACE) 8.6-50 MG tablet Take 1 tablet by mouth 2 times daily Hold for loose stools.       torsemide (DEMADEX) 20 MG tablet Take 20 mg by mouth 2 times daily        vitamin B1 (THIAMINE) 100 MG tablet Take 1 tablet (100 mg) by mouth daily         REVIEW OF SYSTEMS:  10 point ROS of systems including Constitutional, Eyes, Respiratory, Cardiovascular, Gastroenterology, Genitourinary, Integumentary, Musculoskeletal, Psychiatric were all negative except for pertinent positives noted in my HPI.    Objective:  /62   Pulse 53   Temp 98.6  F (37  C)   Resp 19   Wt 111.6 kg (246 lb)   SpO2 97%   BMI 33.36 kg/m     Exam:  GENERAL APPEARANCE:  Alert, in no distress  ENT:  Mouth and posterior oropharynx normal, moist mucous membranes, normal hearing acuity  EYES:  EOM normal, conjunctiva and lids normal  NECK:  No adenopathy,masses or thyromegaly  RESP:  respiratory effort and palpation of chest normal, no respiratory distress, diminished breath sounds bilaterally, no crackles or wheezes  CV:   regular rate and rhythm, 3/6 murmur,  1+ edema RLE with +pedal pulses  ABDOMEN:  mild ascites, soft, non-tender, no distension, no masses. Large scrotal edema.   M/S:   in bed. Wearing LLE prosthesis.  STRATTON with good strength. Mild LUE edema with fading hematoma, no warmth or erythema.   SKIN:  no rashes or open areas  PSYCH:  oriented X 3, memory and insight  impaired,  affect normal    Labs:   Lab Results   Component Value Date    WBC 4.1 04/01/2019     Lab Results   Component Value Date    RBC 2.80 04/01/2019     Lab Results   Component Value Date    HGB 8.6 04/01/2019     Lab Results   Component Value Date    HCT 27.0 04/01/2019     Lab Results   Component Value Date    MCV 96 04/01/2019     Lab Results   Component Value Date    MCH 30.7 04/01/2019     Lab Results   Component Value Date    MCHC 31.9 04/01/2019     Lab Results   Component Value Date    RDW 14.9 04/01/2019     Lab Results   Component Value Date     04/01/2019     Last Comprehensive Metabolic Panel:  Sodium   Date Value Ref Range Status   04/08/2019 134 133 - 144 mmol/L Final     Potassium   Date Value Ref Range Status   04/08/2019 4.5 3.4 - 5.3 mmol/L Final     Chloride   Date Value Ref Range Status   04/08/2019 98 94 - 109 mmol/L Final     Carbon Dioxide   Date Value Ref Range Status   04/08/2019 30 20 - 32 mmol/L Final     Anion Gap   Date Value Ref Range Status   04/08/2019 6 3 - 14 mmol/L Final     Glucose   Date Value Ref Range Status   04/08/2019 113 (H) 70 - 99 mg/dL Final     Urea Nitrogen   Date Value Ref Range Status   04/08/2019 41 (H) 7 - 30 mg/dL Final     Creatinine   Date Value Ref Range Status   04/08/2019 1.98 (H) 0.66 - 1.25 mg/dL Final     GFR Estimate   Date Value Ref Range Status   04/08/2019 32 (L) >60 mL/min/[1.73_m2] Final     Comment:     Non  GFR Calc  Starting 12/18/2018, serum creatinine based estimated GFR (eGFR) will be   calculated using the Chronic Kidney Disease Epidemiology Collaboration   (CKD-EPI) equation.       Calcium   Date Value Ref Range Status   04/08/2019 8.4 (L) 8.5 - 10.1 mg/dL Final         ASSESSMENT / PLAN:  (K70.31) Alcoholic cirrhosis of liver with ascites (H)  (primary encounter diagnosis)  Comment: ascites improved after paracentesis 4/5/2019. Scrotal edema worse-see below  Plan: continue thiamine, MVI, folate    (I50.22) Chronic systolic  congestive heart failure (H)  (I25.5) Ischemic cardiomyopathy   (N18.3) CKD (chronic kidney disease) stage 3, GFR 30-59 ml/min (H)  Comment: increased scrotal edema and RLE edema. Diuresis is limited by rising creatinine. Noncompliant with dietary restrictions.   Plan: continue torsemide at current dose of 20 mg bid. Continue carvedilol. Elevate scrotum. Encouraged low sodium diet. Consider change in diuretic-will see tomorrow with Dr Watson.   Patient and wife in agreement with this plan.     (R78.81) MSSA bacteremia  Comment: infection appears resolved. Completed Ancef 4/2/2019.   Plan: follow up blood cultures done today, per ID.      (I48.0) Paroxysmal atrial fibrillation (H)  (Z86.711) History of pulmonary embolism  Comment: rate controlled. Xarelto was on hold due to bleeding at IV site, worsening anemia and paracentesis. Xarelto resumed yesterday.   Plan: continue carvedilol and Xarelto.    (I10) Essential hypertension  Comment: mild hypotension has improved  Plan: continue carvedilol for rate control, torsemide for CHF. Monitor VS    (M79.622) Pain of left upper arm  Comment: edema and pain improved  Plan: continue prn Norco. Monitor symptoms     (D64.9) Anemia, unspecified type  Comment: Hgb improved  Plan: follow Hgb. Closely monitor for s/s of bleeding.     (S80.12XD) Hematoma of left lower extremity, subsequent encounter  Comment: improved. Pain controlled  Plan: continue Norco. Continue gabapentin for chronic neuropathic pain.     (R53.81) Physical deconditioning  Comment: slowly progressing in therapies  Plan: continue PHYSICAL THERAPY/OT. Goal is to return home with his wife and home services.         Total time spent with patient visit at the skilled nursing facility was 40 mins including patient visit,  review of past records and discussion with his wife. Greater than 50% of total time spent with counseling and coordinating care due to complexity of care  Electronically signed by:  Jhon Sanderson  DIPAK Biggs CNP          Sincerely,        DIPAK Kennedy CNP

## 2019-04-08 NOTE — PROGRESS NOTES
[facility]  Jay Em Medical Record Number:  [id number]  Place of Service where encounter took place:  [facility] ([facility]) [[id number]]  Chief Complaint   Patient presents with     RECHECK       HPI:    Antonio Ramirez  is a 74 year old (1945), who is being seen today for an episodic care visit.  HPI information obtained from: facility chart records, facility staff, patient report and [facility] chart review and wife report.   He came to this facility 3/14/2019 for short term rehab and medical management following hospitalization with LLE pain and hematoma after a mechanical fall. Complex medical history including alcoholic liver cirrhosis, CAD, HTN, afib, CHF, antiphospholipid Ab, chronic anticoagulation with Xarelto, history of PE and DVT with IVC filter, history of MSSA abscess of left  stump in 12/2018.   INR was 7.45 in the ED and vitamin K was given. Hgb ws 6.3 and he was transfused. CT left hip showed muscular hematomas, no fracture. MRI lumbar spine with multiple level degenerative disc disease with severe stenosis.   He became febrile and was found to have MSSA bacteremia of unclear source. ID consulted and he discharged on IV Ancef for 22 days. ESPERANZA negative for valvular vegetation. He underwent paracentesis 3/11/2019. GI consulted and EGD was negative for varices. Xarelto was held and resumed at discharge.       Today's concern is:     Alcoholic cirrhosis of liver with ascites (H)-underwent paracentesis 4/5/2019 with improvement in ascites. Reports shortness of breath has improved. Scrotal edema has worsened and is uncomfortable. He's very upset about this. Was elevating his scrotum on a towel,which he acknowledges was helpful, but has not done this for the past 1-2 days.   Chronic systolic congestive heart failure (H)-weights are inconsistent as some have been done standing and some in his wheelchair, sometimes with his prosthesis. Has a salt shaker on his bedside  table and is drinking a can of pop.   Ischemic cardiomyopathy  CKD (chronic kidney disease) stage 3, GFR 30-59 ml/min (H)-creatinine up to 1.98 today.   MSSA bacteremia-afebrile. Completed Ancef 4/2/2019  Paroxysmal atrial fibrillation (H)-HR: 53-65   Xarelto had been on hold due to bleeding at IV sites, anemia and paracentesis. Resumed last night. No bleeding noted.   History of pulmonary embolism  Essential hypertension-BPs: 112/62, 99/55, 138/70, 117/73  Denies feeling dizzy or lightheaded.   Pain of left upper arm-edema is less, although he disagrees about this. Reports pain has improved.   Anemia, unspecified type  Hematoma of left lower extremity, subsequent encounter-pain improved  Physical deconditioning-ambulating up to 40 ft with walker and contact guard assist. Requires max assist with cares.   SLUMS 21/30, CPT 4.8/5.6    Past Medical and Surgical History reviewed in Epic today.    MEDICATIONS:  Current Outpatient Medications   Medication Sig Dispense Refill     carvedilol (COREG) 3.125 MG tablet TAKE 1 TABLET(3.125 MG) BY MOUTH TWICE DAILY WITH MEALS 180 tablet 0     cyanocobalamin (VITAMIN B-12) 500 MCG SUBL sublingual tablet Place 2 tablets (1,000 mcg) under the tongue daily       fluticasone (FLONASE) 50 MCG/ACT nasal spray Spray 1 spray into both nostrils daily as needed       folic acid (FOLVITE) 1 MG tablet Take 1 tablet (1 mg) by mouth daily       gabapentin (NEURONTIN) 600 MG tablet Take 1 tablet (600 mg) by mouth 2 times daily 60 tablet 11     HYDROcodone-acetaminophen (NORCO) 5-325 MG tablet Take 1 tablet by mouth every 6 hours as needed for pain (for left arm pain/LUE)       hydrocortisone (CORTAID) 1 % external cream Apply topically 2 times daily as needed       ipratropium (ATROVENT) 0.06 % nasal spray Spray 2 sprays into both nostrils 4 times daily       melatonin 3 MG tablet Take 1 tablet (3 mg) by mouth nightly as needed for sleep       miconazole (MICATIN/MICRO GUARD) 2 % external powder  Apply topically every hour as needed for other (topical candidiasis)       multivitamin, therapeutic (THERA-VIT) TABS tablet Take 1 tablet by mouth daily       polyethylene glycol (MIRALAX/GLYCOLAX) packet Take 17 g by mouth daily as needed for constipation       ranitidine (ZANTAC) 150 MG tablet Take 1 tablet (150 mg) by mouth daily       rivaroxaban ANTICOAGULANT (XARELTO) 20 MG TABS tablet Take 20 mg by mouth daily (with dinner)       senna-docusate (SENOKOT-S/PERICOLACE) 8.6-50 MG tablet Take 1 tablet by mouth 2 times daily Hold for loose stools.       torsemide (DEMADEX) 20 MG tablet Take 20 mg by mouth 2 times daily        vitamin B1 (THIAMINE) 100 MG tablet Take 1 tablet (100 mg) by mouth daily         REVIEW OF SYSTEMS:  10 point ROS of systems including Constitutional, Eyes, Respiratory, Cardiovascular, Gastroenterology, Genitourinary, Integumentary, Musculoskeletal, Psychiatric were all negative except for pertinent positives noted in my HPI.    Objective:  /62   Pulse 53   Temp 98.6  F (37  C)   Resp 19   Wt 111.6 kg (246 lb)   SpO2 97%   BMI 33.36 kg/m    Exam:  GENERAL APPEARANCE:  Alert, in no distress  ENT:  Mouth and posterior oropharynx normal, moist mucous membranes, normal hearing acuity  EYES:  EOM normal, conjunctiva and lids normal  NECK:  No adenopathy,masses or thyromegaly  RESP:  respiratory effort and palpation of chest normal, no respiratory distress, diminished breath sounds bilaterally, no crackles or wheezes  CV:   regular rate and rhythm, 3/6 murmur,  1+ edema RLE with +pedal pulses  ABDOMEN:  mild ascites, soft, non-tender, no distension, no masses. Large scrotal edema.   M/S:   in bed. Wearing LLE prosthesis.  STRATTON with good strength. Mild LUE edema with fading hematoma, no warmth or erythema.   SKIN:  no rashes or open areas  PSYCH:  oriented X 3, memory and insight  impaired, affect normal    Labs:   Lab Results   Component Value Date    WBC 4.1 04/01/2019     Lab  Results   Component Value Date    RBC 2.80 04/01/2019     Lab Results   Component Value Date    HGB 8.6 04/01/2019     Lab Results   Component Value Date    HCT 27.0 04/01/2019     Lab Results   Component Value Date    MCV 96 04/01/2019     Lab Results   Component Value Date    MCH 30.7 04/01/2019     Lab Results   Component Value Date    MCHC 31.9 04/01/2019     Lab Results   Component Value Date    RDW 14.9 04/01/2019     Lab Results   Component Value Date     04/01/2019     Last Comprehensive Metabolic Panel:  Sodium   Date Value Ref Range Status   04/08/2019 134 133 - 144 mmol/L Final     Potassium   Date Value Ref Range Status   04/08/2019 4.5 3.4 - 5.3 mmol/L Final     Chloride   Date Value Ref Range Status   04/08/2019 98 94 - 109 mmol/L Final     Carbon Dioxide   Date Value Ref Range Status   04/08/2019 30 20 - 32 mmol/L Final     Anion Gap   Date Value Ref Range Status   04/08/2019 6 3 - 14 mmol/L Final     Glucose   Date Value Ref Range Status   04/08/2019 113 (H) 70 - 99 mg/dL Final     Urea Nitrogen   Date Value Ref Range Status   04/08/2019 41 (H) 7 - 30 mg/dL Final     Creatinine   Date Value Ref Range Status   04/08/2019 1.98 (H) 0.66 - 1.25 mg/dL Final     GFR Estimate   Date Value Ref Range Status   04/08/2019 32 (L) >60 mL/min/[1.73_m2] Final     Comment:     Non  GFR Calc  Starting 12/18/2018, serum creatinine based estimated GFR (eGFR) will be   calculated using the Chronic Kidney Disease Epidemiology Collaboration   (CKD-EPI) equation.       Calcium   Date Value Ref Range Status   04/08/2019 8.4 (L) 8.5 - 10.1 mg/dL Final         ASSESSMENT / PLAN:  (K70.31) Alcoholic cirrhosis of liver with ascites (H)  (primary encounter diagnosis)  Comment: ascites improved after paracentesis 4/5/2019. Scrotal edema worse-see below  Plan: continue thiamine, MVI, folate    (I50.22) Chronic systolic congestive heart failure (H)  (I25.5) Ischemic cardiomyopathy   (N18.3) CKD (chronic  kidney disease) stage 3, GFR 30-59 ml/min (H)  Comment: increased scrotal edema and RLE edema. Diuresis is limited by rising creatinine. Noncompliant with dietary restrictions.   Plan: continue torsemide at current dose of 20 mg bid. Continue carvedilol. Elevate scrotum. Encouraged low sodium diet. Consider change in diuretic-will see tomorrow with Dr Watson.   Patient and wife in agreement with this plan.     (R78.81) MSSA bacteremia  Comment: infection appears resolved. Completed Ancef 4/2/2019.   Plan: follow up blood cultures done today, per ID.      (I48.0) Paroxysmal atrial fibrillation (H)  (Z86.711) History of pulmonary embolism  Comment: rate controlled. Xarelto was on hold due to bleeding at IV site, worsening anemia and paracentesis. Xarelto resumed yesterday.   Plan: continue carvedilol and Xarelto.    (I10) Essential hypertension  Comment: mild hypotension has improved  Plan: continue carvedilol for rate control, torsemide for CHF. Monitor VS    (M79.622) Pain of left upper arm  Comment: edema and pain improved  Plan: continue prn Norco. Monitor symptoms     (D64.9) Anemia, unspecified type  Comment: Hgb improved  Plan: follow Hgb. Closely monitor for s/s of bleeding.     (S80.12XD) Hematoma of left lower extremity, subsequent encounter  Comment: improved. Pain controlled  Plan: continue Norco. Continue gabapentin for chronic neuropathic pain.     (R53.81) Physical deconditioning  Comment: slowly progressing in therapies  Plan: continue PHYSICAL THERAPY/OT. Goal is to return home with his wife and home services.         Total time spent with patient visit at the skilled nursing facility was 40 mins including patient visit,  review of past records and discussion with his wife. Greater than 50% of total time spent with counseling and coordinating care due to complexity of care  Electronically signed by:  DIPAK Kennedy CNP

## 2019-04-09 ENCOUNTER — NURSING HOME VISIT (OUTPATIENT)
Dept: GERIATRICS | Facility: CLINIC | Age: 74
End: 2019-04-09
Payer: MEDICARE

## 2019-04-09 DIAGNOSIS — I10 ESSENTIAL HYPERTENSION: ICD-10-CM

## 2019-04-09 DIAGNOSIS — R53.81 PHYSICAL DECONDITIONING: ICD-10-CM

## 2019-04-09 DIAGNOSIS — D64.9 ANEMIA, UNSPECIFIED TYPE: ICD-10-CM

## 2019-04-09 DIAGNOSIS — N18.30 CKD (CHRONIC KIDNEY DISEASE) STAGE 3, GFR 30-59 ML/MIN (H): ICD-10-CM

## 2019-04-09 DIAGNOSIS — B95.61 MSSA BACTEREMIA: ICD-10-CM

## 2019-04-09 DIAGNOSIS — K70.31 ALCOHOLIC CIRRHOSIS OF LIVER WITH ASCITES (H): Primary | ICD-10-CM

## 2019-04-09 DIAGNOSIS — R78.81 MSSA BACTEREMIA: ICD-10-CM

## 2019-04-09 DIAGNOSIS — I50.22 CHRONIC SYSTOLIC CONGESTIVE HEART FAILURE (H): ICD-10-CM

## 2019-04-09 DIAGNOSIS — I48.0 PAROXYSMAL ATRIAL FIBRILLATION (H): ICD-10-CM

## 2019-04-09 PROCEDURE — 99310 SBSQ NF CARE HIGH MDM 45: CPT | Performed by: INTERNAL MEDICINE

## 2019-04-09 NOTE — LETTER
"    4/9/2019        RE: Antonio Ramirez  [address]  [facility] [address]        Antonio Ramirez is a 74 year old male seen April 9, 2019 at [facility] where he was admitted last month after a fall, seen to follow up persistent volume overload despite treatment with torsemide.        Patient is seen in his room, resting abed.   Biggest concern is worsening scrotal edema that is uncomfortable.   Diuretics have been increased, but size of scrotum is same or bigger.      He had paracentesis on Friday 4/5 removing about 4 L; paracentesis before that was 3/11 removing 3L.     At March hospitalization he was found to have MSSA bacteremia of unclear etiology.  He was seen by ID and recommended to receive 22 days of IV Ancef which finished up on 4/2.  No valvular vegetation seen on ESPERANZA.     Pt had December 2018 hospitalization and TCU stay for left stump MSSA abscess.  He had I&D with BKA stump revision.   He made progress with therapies and discharged home.   While at home he gradually became weaker, seen in ED twice in February for weakness and fall related pain.      Ongoing medical issues include PAF with anticoagulation, antiphospholipid Ab, CAD/CHF and alcoholic cirrhosis.       EGD ruled out esophageal varices, which was done prior to ESPERANZA.        Pt has had many years of alcohol abuse, with secondary cirrhosis, has had multiple paracenteses in the past.   Today reports his liver has always been able to recover when he stopped drinking for a while, wonders why this isn't the case now.   Was drinking up until March admission, \"but not as much\"        He also has CAD with ischemic cardiomyopathy and systolic CHF with low EF contributing to volume excess.   Recent increases of diuretic has resulted in Creatinine bump        Past Medical History:   Diagnosis Date     Alcoholism (H)      Amputated left leg (H)      Anemia      Anticardiolipin antibody syndrome (H)      Antiphospholipid antibody syndrome (H)  "     Antiplatelet or antithrombotic long-term use      Aortic root dilatation (H)      Aortic stenosis      Arthritis      Balance problem      Coronary artery disease     CABG 2007: SVG to LAD, SVG to diagonal, SVG to OM; cardiac cath 5/17/18: thrombectomy for restenosis of stents-sent for urgent CABG, cath 5/14/2007: GRECIA x2 to LAD, Grecia to diagonal     Gastro-oesophageal reflux disease      Heart attack (H) 2007    apparently arrested, cardiac X5     Hiatal hernia      Hypercholesterolemia      Hypertension      Ischemic cardiomyopathy      Left foot drop      Liver disease     alcoholic liver disease, alcoholic cirrohsosis, portal hypertension     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)     ?When diagnosed, patient not aware of this     Moderate mitral regurgitation      Monoclonal gammopathy      Neuropathy      Noninfectious ileitis     diverticulitis of colon     Nonrheumatic tricuspid valve regurgitation      Paroxysmal atrial fibrillation (H)      PE (pulmonary embolism) 2006    saddle emboli 2006     Prostate cancer (H) 2000    Treated with radiation (seed implants in 2000) -- no problems since     Pulmonary hypertension (H)      Renal disease     kidney stones     Syncope      Thrombocytopenia (H)      Urethral stricture      Venous thrombosis     IVC filter and lysis procedures 2007       Past Surgical History:   Procedure Laterality Date     AMPUTATE LEG BELOW KNEE Left 04/2014    related to gangrene, started as foot ulcer related to neuropathy     AMPUTATE LEG BELOW KNEE Left 12/4/2017    Procedure: AMPUTATE LEG BELOW KNEE;  Exploration of Left Leg Below Knee Amputation Stump with Ligation of Bleeders.;  Surgeon: Leandro Arreola MD;  Location:  OR     APPENDECTOMY       APPLY WOUND VAC Left 12/6/2017    Procedure: APPLY WOUND VAC;;  Surgeon: Saima Howard MD;  Location:  SD     ARTHROPLASTY HIP Right 11/27/2018    Procedure: RIGHT TOTAL HIP ARTHROPLASTY;  Surgeon: Saima Howard  MD;  Location:  OR     ARTHROPLASTY KNEE Right 9/19/2014    Procedure: ARTHROPLASTY KNEE;  Surgeon: Saima Howard MD;  Location:  OR     CARDIAC SURGERY      CABG     CHOLECYSTECTOMY       COLONOSCOPY       COMBINED CYSTOSCOPY, RETROGRADES, EXCHANGE STENT URETER(S) Left 7/24/2018    Procedure: COMBINED CYSTOSCOPY, RETROGRADES, EXCHANGE STENT URETER(S);  CYSTOSCOPY, BILATERAL RETROGRADES, BILATERAL URETERAL STENT EXCHANGE ;  Surgeon: Matt Cervantes MD;  Location:  OR     CRANIOTOMY Right 4/7/2018    Procedure: CRANIOTOMY;  Right Craniotomy For Subdural Hematoma;  Surgeon: Jono Issa MD;  Location:  OR     CYSTOSCOPY, DILATE URETHRA, COMBINED N/A 10/12/2016    Procedure: COMBINED CYSTOSCOPY, DILATE URETHRA;  Surgeon: Dimitry Bello MD;  Location:  OR     CYSTOSCOPY, REMOVE STENT(S), COMBINED Right 7/24/2018    Procedure: COMBINED CYSTOSCOPY, REMOVE STENT(S);;  Surgeon: Jose Hunter MD;  Location:  OR     ENDOSCOPIC STRIPPING VEIN(S)       esophagogastroduodenoscopy       ESOPHAGOSCOPY, GASTROSCOPY, DUODENOSCOPY (EGD), COMBINED N/A 3/11/2019    Procedure: COMBINED ESOPHAGOSCOPY, GASTROSCOPY, DUODENOSCOPY (EGD), BIOPSY SINGLE OR MULTIPLE;  Surgeon: Katherin Boyd MD;  Location:  GI     EYE SURGERY      cataracts     GENITOURINARY SURGERY      Prostate Seen Implants     HERNIA REPAIR      Umbilical     INCISION AND DRAINAGE LOWER EXTREMITY, COMBINED Left 12/1/2017    Procedure: COMBINED INCISION AND DRAINAGE LOWER EXTREMITY;  INCISION AND DRAINAGE OF LEFT BELOW KNEE AMPUTATION ABSCESS, WOUND VAC PLACEMENT;  Surgeon: Saima Howard MD;  Location:  OR     IR IVC FILTER PLACEMENT       IRRIGATION AND DEBRIDEMENT LOWER EXTREMITY, COMBINED Left 12/6/2017    Procedure: COMBINED IRRIGATION AND DEBRIDEMENT LOWER EXTREMITY;  IRRIGATION AND DEBRIDEMENT OF LEFT BELOW KNEE AMPUTATION SITE WITH WOUND VAC REPLACEMENT;  Surgeon: Saima Howard MD;  Location:  [facility]     IRRIGATION AND DEBRIDEMENT LOWER EXTREMITY, COMBINED Left 12/8/2017    Procedure: COMBINED IRRIGATION AND DEBRIDEMENT LOWER EXTREMITY;  IRRIGATION AND DEBRIDEMENT OF LEFT BELOW THE KNEE AMPUTATION AND SHORTENING OF THE TIBIA WITH WOUND CLOSURE;  Surgeon: Saima Howard MD;  Location:  OR     LASER HOLMIUM LITHOTRIPSY URETER(S), INSERT STENT, COMBINED Bilateral 6/28/2018    Procedure: COMBINED CYSTOSCOPY, URETEROSCOPY, LASER HOLMIUM LITHOTRIPSY URETER(S), INSERT STENT;  CYSTOSCOPY, BILATERAL URETEROSCOPY,  HOLMIUM LASER LITHOTRIPSY, BILATERAL STENT PLACEMENT, STONE BASKETING;  Surgeon: Jose Hunter MD;  Location:  OR     LASER HOLMIUM LITHOTRIPSY URETER(S), INSERT STENT, COMBINED Left 8/14/2018    Procedure: COMBINED CYSTOSCOPY, URETEROSCOPY, LASER HOLMIUM LITHOTRIPSY URETER(S), INSERT STENT;  CYSTOSCOPY, LEFT URETEROSCOPY, LEFT LASER HOLMIUM LITHOTRIPSY URETER(S) REMOVAL BILATERAL STENTS;  Surgeon: Jose Hunter MD;  Location:  OR     ORTHOPEDIC SURGERY      (R) knee scope     ORTHOPEDIC SURGERY      total hip      ORTHOPEDIC SURGERY      elbow surgery     SH:  Lives with his wife of 50 years, Helena, in an apartment in [place].   They have a son and a daughter  Retired     Review Of Systems: reviewed and negative other than above  Wt Readings from Last 5 Encounters:   04/08/19 111.6 kg (246 lb)   04/08/19 111.6 kg (246 lb)   04/03/19 112.7 kg (248 lb 6.4 oz)   03/28/19 112.9 kg (248 lb 12.8 oz)   03/25/19 101.2 kg (223 lb)        EXAM:  NAD, pale  /62   Pulse 53   Temp 98.6  F (37  C)   Resp 18   Ht 1.829 m (6')   Wt 111.6 kg (246 lb)   SpO2 97%   BMI 33.36 kg/m      Neck supple without adenopathy  Lungs with decreased BS, few fine rales  Heart RRR s1s2 with 1/6 HSM  Abd with distention, ascites, NT, +BS  Scrotum size of a large grapefruit, mild erythema, no open areas  Ext 2-3+ peripheral edema, left arm with ace wrap in place.   Neuro: some difficulty tracking new  information, mild STML  Psych: anxious     Last Comprehensive Metabolic Panel:  Sodium   Date Value Ref Range Status   04/08/2019 134 133 - 144 mmol/L Final     Potassium   Date Value Ref Range Status   04/08/2019 4.5 3.4 - 5.3 mmol/L Final     Chloride   Date Value Ref Range Status   04/08/2019 98 94 - 109 mmol/L Final     Carbon Dioxide   Date Value Ref Range Status   04/08/2019 30 20 - 32 mmol/L Final     Anion Gap   Date Value Ref Range Status   04/08/2019 6 3 - 14 mmol/L Final     Glucose   Date Value Ref Range Status   04/08/2019 113 (H) 70 - 99 mg/dL Final     Urea Nitrogen   Date Value Ref Range Status   04/08/2019 41 (H) 7 - 30 mg/dL Final     Creatinine   Date Value Ref Range Status   04/08/2019 1.98 (H) 0.66 - 1.25 mg/dL Final     GFR Estimate   Date Value Ref Range Status   04/08/2019 32 (L) >60 mL/min/[1.73_m2] Final     Comment:     Non  GFR Calc  Starting 12/18/2018, serum creatinine based estimated GFR (eGFR) will be   calculated using the Chronic Kidney Disease Epidemiology Collaboration   (CKD-EPI) equation.       Calcium   Date Value Ref Range Status   04/08/2019 8.4 (L) 8.5 - 10.1 mg/dL Final     Lab Results   Component Value Date    AST 25 04/01/2019     Lab Results   Component Value Date    ALT 11 02/28/2019      BILITOTAL 2.9 02/28/2019     Lab Results   Component Value Date    ALBUMIN 2.7 02/28/2019     Lab Results   Component Value Date    PROTTOTAL 6.9 02/28/2019      Lab Results   Component Value Date    ALKPHOS 90 02/28/2019     Lab Results   Component Value Date    WBC 4.1 04/01/2019      HGB 8.6 04/01/2019      MCV 96 04/01/2019       04/01/2019        ESPERANZA 3/13/19   Interpretation Summary  1. No evidence of valvular endocarditis.  2. Trileaflet aortic valve with moderate stenosis. Peak transaortic velocity 3.3 m/s, mean gradient 24 mmHg.  3. Moderate mitral regurgitation. Mechanism is likely functional. 2 jets. The regurgitant orifice area of the larger jet is  0.25 cm2,    regurgitant volume 35 ml.  4. Moderately decreased left ventricular systolic function. Estimated LVEF 35-40%.  5. Regional wall motion abnormalities as specified in recent transthoracic echocardiogram.  6. Bubble study is negative for flow across the atrial septum.       IMP/PLAN:  (R78.81) MSSA bacteremia    Comment: unclear source     Plan: finished course of abx last week   Follow temps, will need to obtain blood cultures if any fever    (Z91.81) H/O fall, recent  (S80.12XD) Hematoma of left lower extremity, subsequent encounter  (G54.6) Phantom limb pain (H)  Comment: LLE pain and swelling.     Plan: pain regimen includes gabapentin, prn oxycodone  Assist with transfers and mobility       (D68.61) Antiphospholipid antibody syndrome (H)  Comment: hypercoagulability, anemia, liver disease  Plan: continue rivaroxaban; follow hgb       (K70.31) Alcoholic cirrhosis of liver with ascites (H)  Comment:   S/p paracentesis 3/11/19 and 4/5   MELD score based on previous labs would be 23  Plan:   He declined Psychiatric referral in the hospital. Continue thiamine, MVI, folate.    Reviewed with patient that scrotal edema is a result of CHF and portal hypertension.     Will add low dose spironolactone 12.5 mg/day and follow bps and lytes.   Repeat paracentesis this week.   Referral to GI /hepatologist for ongoing care.       (I50.9) Chronic congestive heart failure, unspecified congestive heart failure type (H)  (N18.3) CKD (chronic kidney disease) stage 3, GFR 30-59 ml/min (H)  Comment: + volume overload   Plan:   Continue current dose of torsemide and carvedilol, daily weights and follow renal function.      (I48.0) Paroxysmal a-fib (H)  Comment: controlled VR on very low dose carvedilol  Plan: anticoagulation as above.       (D62) Anemia due to blood loss, acute  Comment: and pancytopenia  Plan: follow CBC   He has been followed by Hem/Onc    (M62.81) Generalized muscle weakness  Comment: related to all above    Plan: PHYSICAL THERAPY / OCCUPATIONAL THERAPY for strengthening, transfers, ADLs, gait.     Discharge goal is return home with his wife.       Shelia Watson MD       Sincerely,        Shelia Watson MD

## 2019-04-10 ENCOUNTER — HOSPITAL ENCOUNTER (OUTPATIENT)
Dept: ULTRASOUND IMAGING | Facility: CLINIC | Age: 74
End: 2019-04-10
Attending: INTERNAL MEDICINE
Payer: MEDICARE

## 2019-04-10 ENCOUNTER — HOSPITAL ENCOUNTER (OUTPATIENT)
Facility: CLINIC | Age: 74
Discharge: NURSING, REHAB OR LONG-TERM CARE | End: 2019-04-10
Admitting: RADIOLOGY
Payer: MEDICARE

## 2019-04-10 VITALS
WEIGHT: 240 LBS | HEIGHT: 73 IN | HEART RATE: 51 BPM | OXYGEN SATURATION: 99 % | SYSTOLIC BLOOD PRESSURE: 106 MMHG | RESPIRATION RATE: 18 BRPM | BODY MASS INDEX: 31.81 KG/M2 | DIASTOLIC BLOOD PRESSURE: 52 MMHG | TEMPERATURE: 97.3 F

## 2019-04-10 DIAGNOSIS — K70.31 ALCOHOLIC CIRRHOSIS OF LIVER WITH ASCITES (H): ICD-10-CM

## 2019-04-10 PROCEDURE — 25000125 ZZHC RX 250: Performed by: INTERNAL MEDICINE

## 2019-04-10 PROCEDURE — 27210190 US PARACENTESIS

## 2019-04-10 PROCEDURE — 40000863 ZZH STATISTIC RADIOLOGY XRAY, US, CT, MAR, NM

## 2019-04-10 RX ORDER — LIDOCAINE HYDROCHLORIDE 10 MG/ML
10 INJECTION, SOLUTION EPIDURAL; INFILTRATION; INTRACAUDAL; PERINEURAL ONCE
Status: COMPLETED | OUTPATIENT
Start: 2019-04-10 | End: 2019-04-10

## 2019-04-10 RX ORDER — DEXTROSE MONOHYDRATE 25 G/50ML
25-50 INJECTION, SOLUTION INTRAVENOUS
Status: DISCONTINUED | OUTPATIENT
Start: 2019-04-10 | End: 2019-04-10 | Stop reason: HOSPADM

## 2019-04-10 RX ORDER — NICOTINE POLACRILEX 4 MG
15-30 LOZENGE BUCCAL
Status: DISCONTINUED | OUTPATIENT
Start: 2019-04-10 | End: 2019-04-10 | Stop reason: HOSPADM

## 2019-04-10 RX ORDER — SPIRONOLACTONE 25 MG/1
12.5 TABLET ORAL DAILY
COMMUNITY
End: 2019-04-20

## 2019-04-10 RX ORDER — ALBUMIN (HUMAN) 12.5 G/50ML
12.5 SOLUTION INTRAVENOUS ONCE
Status: DISCONTINUED | OUTPATIENT
Start: 2019-04-10 | End: 2019-04-10 | Stop reason: HOSPADM

## 2019-04-10 RX ADMIN — LIDOCAINE HYDROCHLORIDE 10 ML: 10 INJECTION, SOLUTION EPIDURAL; INFILTRATION; INTRACAUDAL; PERINEURAL at 14:45

## 2019-04-10 ASSESSMENT — MIFFLIN-ST. JEOR: SCORE: 1872.51

## 2019-04-10 NOTE — PROGRESS NOTES
Paracentesis began at 1435  End at 1449  3000mL Charlene fluid removed from abdomen  Patient tolerated procedure well.  Dressing CDI, no swelling or hematoma.  Report called to receiving RN.

## 2019-04-10 NOTE — PROGRESS NOTES
[place] [facility]  PRIMARY CARE PROVIDER AND CLINIC:  [facility] [place] [address] / RADHA M*  Chief Complaint   Patient presents with     Hospital F/U       HPI:    Antonio Ramirez is a 72 year old  (1945),admitted to the [facility] from [facility].  Hospital stay 11/29/2017 through 12/10/2017.  Admitted to this facility for  rehab, medical management and nursing care.  HPI information obtained from: facility chart records, facility staff, patient report and [facility] chart review.    He has a history of left BKA 4 years ago due to gangrene infection and was hospitalized with edema, erythema, pain  and new open area on his stump, possibly related to a change in the rubber sleeve for his prosthesis. He had intermittent fevers to 102. Ortho consulted and he underwent I&D for a subcutaneous abscess of the stump on 12/1/2017. Wound vac was placed.  ID consulted and vanco and zosyn started. Cultures grew MSSA and antibiotics changed to Ancef. Blood cultures no growth. He returned to the OR 12/8/2017 for removal of wound vac, I&D and revision of BKA stump by Dr Howard. Discharged on Augmentin for 10 days.   He had mild thrombocytopenia, thought to be related to infection and etoh abuse. At discharge: platelet 184,000, Hgb 9.3.     Current issues are:         Infection of left below knee amputation (H)-reports pain is controlled. Afebrile. Occasional dry cough, no shortness of breath or chest pain.   Abscess of leg, left  Paroxysmal a-fib (H)-INR 1.7 today. Had coumadin 5 mg last night. Usual coumadin dose is 5 mg for 6 days/week, no coumadin on Sundays. HR: 52-65  Antiphospholipid antibody syndrome (H)  Chronic congestive heart failure, unspecified congestive heart failure type (H)-EF 45-50%.   Coronary artery disease involving native heart with angina pectoris, unspecified vessel or lesion type (H)-history of CABG in 2007.  Repatha is not covered by his insurance and he would like it held while on tcu.   Alcoholic cirrhosis of liver with ascites (H)-requests scheduled tylenol be discontinued due to liver disease. Drinks daily.   Lymphedema-chronic LE edema and uses a lymphedema compression device daily. Denies RLE calf pain.   Chronic kidney disease, stage III (moderate)  Anemia-pre op Hgb 14.0.   Physical deconditioning-requires assist of 1 with cares.     BP's: 96/54, 108/64, 105/67  HR: 51-65    CODE STATUS/ADVANCE DIRECTIVES DISCUSSION:   CPR/Full code   Patient's living condition: lives with spouse    ALLERGIES:Hmg-coa-r inhibitors and Ciprofloxacin  PAST MEDICAL HISTORY:  has a past medical history of Alcoholism (H); Amputated left leg (H); Anemia; Anticardiolipin antibody syndrome (H); Antiplatelet or antithrombotic long-term use; Aortic stenosis; Arthritis; Balance problem; Cardiomyopathy (H); Coagulation disorder (H); Coronary artery disease; Gastro-oesophageal reflux disease; Heart attack (2007); Hiatal hernia; Hypercholesterolemia; Hypertension; Left foot drop; Liver disease; 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  PAST SURGICAL HISTORY:  has a past surgical history that includes appendectomy; Endoscopic stripping vein(s); Cardiac surgery; IR IVC Filter Placement; colonoscopy; Eye surgery; Cholecystectomy; esophagogastroduodenoscopy; orthopedic surgery; orthopedic surgery; orthopedic surgery; Arthroplasty knee (Right, 9/19/2014); hernia repair; Abdomen surgery; Cystoscopy, dilate urethra, combined (N/A, 10/12/2016); Amputate leg below knee (Left, 04/2014); Incision and drainage lower extremity, combined (Left, 12/1/2017); Amputate leg  below knee (Left, 12/4/2017); Irrigation and debridement lower extremity, combined (Left, 12/6/2017); Apply Wound Vac (Left, 12/6/2017); and Irrigation and debridement lower extremity, combined (Left, 12/8/2017).  FAMILY HISTORY: family history includes Heart Failure in his father; Lung Cancer in his mother.  SOCIAL HISTORY:  reports that he has never smoked. He has never used smokeless tobacco. He reports that he drinks alcohol. He reports that he does not use illicit drugs.    Post Discharge Medication Reconciliation Status: discharge medications reconciled and changed, per note/orders (see AVS).  Current Outpatient Prescriptions   Medication Sig Dispense Refill     amoxicillin-clavulanate (AUGMENTIN) 875-125 MG per tablet Take 1 tablet by mouth 2 times daily 20 tablet 0     oxyCODONE IR (ROXICODONE) 5 MG tablet Take 1-2 tablets (5-10 mg) by mouth every 4 hours as needed for moderate to severe pain 18 tablet 0     acetaminophen (TYLENOL) 325 MG tablet Take 2 tablets (650 mg) by mouth every 4 hours as needed for other (surgical pain) 100 tablet      lactobacillus rhamnosus, GG, (CULTURELL) capsule Take 1 capsule by mouth daily       docusate sodium (COLACE) 100 MG capsule Take 2 capsules (200 mg) by mouth 2 times daily 60 capsule      polyethylene glycol (MIRALAX/GLYCOLAX) Packet Take 17 g by mouth 2 times daily as needed for constipation (constipation) 7 packet      order for [facility] Order Phone [phone] Fax [phone]    Primary Dressing Woun'Dres Gel   Qty DO NOT DISPENSE  Secondary Dressing Mepilex 4x4 Qty 60 (for two wounds)  Length of Need: 1 month  Frequency of dressing change: daily 30 days 0     potassium chloride SA (K-DUR/KLOR-CON M) 10 MEQ CR tablet Take 10 mEq by mouth daily       torsemide (DEMADEX) 20 MG tablet Take 20 mg by mouth 2 times daily        ASPIRIN EC PO Take 81 mg by mouth daily       Gabapentin (NEURONTIN PO) Take 600-1,200 mg by mouth 3 times daily 600 mg in morning, 600  "mg midday & 1200 mg at HS       CARVEDILOL PO Take 3.125 mg by mouth 2 times daily (with meals)        warfarin (COUMADIN) 1 MG tablet Take 5 tablets (5 mg) by mouth daily Check INR 12/11 for further dosing. 30 tablet 0     evolocumab (REPATHA) 140 MG/ML prefilled syringe Inject 1 pen subcutaneously every 2 weeks. For sample use only         ROS:  10 point ROS of systems including Constitutional, Eyes, Respiratory, Cardiovascular, Gastroenterology, Genitourinary, Integumentary, Muscularskeletal, Psychiatric were all negative except for pertinent positives noted in my HPI.    Exam:  BP 96/54  Pulse 52  Temp 97  F (36.1  C)  Resp 14  Ht 6' 0.99\" (1.854 m)  Wt 249 lb (112.9 kg)  SpO2 98%  BMI 32.86 kg/m2  GENERAL APPEARANCE:  Alert, in no distress  ENT:  Mouth and posterior oropharynx normal, moist mucous membranes, normal hearing acuity  EYES:  EOM normal, conjunctiva and lids normal, PERRL  NECK:  No adenopathy,masses or thyromegaly  RESP:  respiratory effort and palpation of chest normal, lungs clear to auscultation , no respiratory distress  CV:  Palpation and auscultation of heart done , regular rate and rhythm, 2/6 murmur, peripheral edema 1-2+ in RLE, positive pedal pulses  ABDOMEN:  normal bowel sounds, soft, nontender, no hepatosplenomegaly or other masses  M/S:   in bed. Left BKA. STRATTON with good strength. No joint inflammation  SKIN:  dressing to left stump clean, dry, intact. No rashes or open areas  PSYCH:  oriented X 3, normal insight, judgement and memory    Lab/Diagnostic data:  Last Basic Metabolic Panel:  Lab Results   Component Value Date     12/08/2017      Lab Results   Component Value Date    POTASSIUM 4.2 12/08/2017     Lab Results   Component Value Date    CHLORIDE 107 12/08/2017     Lab Results   Component Value Date    BENNIE 8.6 12/08/2017     Lab Results   Component Value Date    CO2 25 12/08/2017     Lab Results   Component Value Date    BUN 10 12/08/2017     Lab Results   Component " Value Date    CR 1.19 12/08/2017     Lab Results   Component Value Date    GLC 84 12/08/2017     Lab Results   Component Value Date    WBC 7.4 12/08/2017     Lab Results   Component Value Date    RBC 2.93 12/08/2017     Lab Results   Component Value Date    HGB 9.3 12/09/2017     Lab Results   Component Value Date    HCT 28.1 12/08/2017     Lab Results   Component Value Date    MCV 96 12/08/2017     Lab Results   Component Value Date    MCH 31.4 12/08/2017     Lab Results   Component Value Date    MCHC 32.7 12/08/2017     Lab Results   Component Value Date    RDW 15.8 12/08/2017     Lab Results   Component Value Date     12/09/2017     Lab Results   Component Value Date    INR 1.39 12/10/2017    INR 1.16 12/09/2017    INR 1.25 12/08/2017    INR 1.65 12/04/2017    INR 1.65 12/01/2017     ASSESSMENT / PLAN:  (T87.44) Infection of left below knee amputation (H)  (primary encounter diagnosis)  (L02.416) Abscess of Left BKA -- S/P I&D 12/1/17, MSSA  Comment: appears to be improving. Pain controlled.   Plan: continue Augmentin for 10 days. Continue gabapentin, oxycodone, prn tylenol. Follow up Ortho for dressing change later today.     (I48.0) Paroxysmal a-fib (H)  (D68.61) Antiphospholipid Jina Syn, Hypercoag (DVT, PE) -- has IVC filt and Warfarin  Comment: INR subtherapeutic. HR controlled.   Plan: coumadin 5 mg daily. INR 12/14/2017. Continue carvedilol.     (I50.9) Chronic congestive heart failure, unspecified congestive heart failure type (H)  Comment: appears compensated  Plan: continue torsemide, KCl. Daily weight.     (I25.119) Coronary artery disease involving native heart with angina pectoris, unspecified vessel or lesion type (H)  Comment: no acute issues  Plan: continue ASA, carvedilol. Hold Repatha while on tcu due to lack of insurance coverage. Cardiology follow up per usual schedule.     (K70.31) Alcoholic cirrhosis of liver with ascites (H)  Comment: chronic  Plan: discontinue scheduled  tylenol    (I89.0) Lymphedema  Comment: chronic LE edema  Plan: continue torsemide. Lymphedema compression device daily per home routine.     (N18.3) Chronic kidney disease, stage III (moderate)  Comment: renal function at baseline  Plan: BMP    (D62) Anemia due to blood loss, acute  Thrombocytopenia  Comment: no s/s of bleeding  Plan: CBC    (R53.81) Physical deconditioning  Comment: due to acute illness and hospitalization  Plan: PHYSICAL THERAPY/OT. Goal is to return home with services.         Electronically signed by:  DIPAK Kennedy CNP                 Admission Reconciliation is Completed  Discharge Reconciliation is Completed

## 2019-04-10 NOTE — PROCEDURES
RADIOLOGY POST PROCEDURE NOTE    Patient name: Antonio Ramirez  MRN: [id number]  : 1945    Pre-procedure diagnosis: ascites  Post-procedure diagnosis: Same    Procedure Date/Time: April 10, 2019  2:39 PM  Procedure: paracentesis  Estimated blood loss: None  Specimen(s) collected with description: ascites    The patient tolerated the procedure well with no immediate complications.  Significant findings:none    See imaging dictation for procedural details.    Provider name: Mary Pittman  Assistant(s):None

## 2019-04-10 NOTE — DISCHARGE INSTRUCTIONS

## 2019-04-12 ENCOUNTER — NURSING HOME VISIT (OUTPATIENT)
Dept: GERIATRICS | Facility: CLINIC | Age: 74
End: 2019-04-12
Payer: MEDICARE

## 2019-04-12 ENCOUNTER — HOSPITAL LABORATORY (OUTPATIENT)
Dept: OTHER | Facility: CLINIC | Age: 74
End: 2019-04-12

## 2019-04-12 VITALS
HEIGHT: 72 IN | SYSTOLIC BLOOD PRESSURE: 114 MMHG | BODY MASS INDEX: 35.33 KG/M2 | HEART RATE: 54 BPM | RESPIRATION RATE: 18 BRPM | DIASTOLIC BLOOD PRESSURE: 66 MMHG | OXYGEN SATURATION: 94 % | TEMPERATURE: 97.7 F | WEIGHT: 260.8 LBS

## 2019-04-12 DIAGNOSIS — R41.89 COGNITIVE IMPAIRMENT: ICD-10-CM

## 2019-04-12 DIAGNOSIS — R53.81 PHYSICAL DECONDITIONING: ICD-10-CM

## 2019-04-12 DIAGNOSIS — I48.0 PAROXYSMAL ATRIAL FIBRILLATION (H): ICD-10-CM

## 2019-04-12 DIAGNOSIS — B95.61 MSSA BACTEREMIA: ICD-10-CM

## 2019-04-12 DIAGNOSIS — I50.22 CHRONIC SYSTOLIC CONGESTIVE HEART FAILURE (H): ICD-10-CM

## 2019-04-12 DIAGNOSIS — I10 ESSENTIAL HYPERTENSION: ICD-10-CM

## 2019-04-12 DIAGNOSIS — Z86.711 HISTORY OF PULMONARY EMBOLISM: ICD-10-CM

## 2019-04-12 DIAGNOSIS — K70.31 ALCOHOLIC CIRRHOSIS OF LIVER WITH ASCITES (H): Primary | ICD-10-CM

## 2019-04-12 DIAGNOSIS — R78.81 MSSA BACTEREMIA: ICD-10-CM

## 2019-04-12 DIAGNOSIS — D64.9 ANEMIA, UNSPECIFIED TYPE: ICD-10-CM

## 2019-04-12 DIAGNOSIS — I25.5 ISCHEMIC CARDIOMYOPATHY: ICD-10-CM

## 2019-04-12 DIAGNOSIS — N18.30 CKD (CHRONIC KIDNEY DISEASE) STAGE 3, GFR 30-59 ML/MIN (H): ICD-10-CM

## 2019-04-12 LAB
ANION GAP SERPL CALCULATED.3IONS-SCNC: 3 MMOL/L (ref 3–14)
BUN SERPL-MCNC: 41 MG/DL (ref 7–30)
CALCIUM SERPL-MCNC: 8.7 MG/DL (ref 8.5–10.1)
CHLORIDE SERPL-SCNC: 103 MMOL/L (ref 94–109)
CO2 SERPL-SCNC: 31 MMOL/L (ref 20–32)
CREAT SERPL-MCNC: 1.71 MG/DL (ref 0.66–1.25)
GFR SERPL CREATININE-BSD FRML MDRD: 38 ML/MIN/{1.73_M2}
GLUCOSE SERPL-MCNC: 78 MG/DL (ref 70–99)
POTASSIUM SERPL-SCNC: 4.7 MMOL/L (ref 3.4–5.3)
SODIUM SERPL-SCNC: 137 MMOL/L (ref 133–144)

## 2019-04-12 PROCEDURE — 99309 SBSQ NF CARE MODERATE MDM 30: CPT | Performed by: NURSE PRACTITIONER

## 2019-04-12 ASSESSMENT — MIFFLIN-ST. JEOR: SCORE: 1950.98

## 2019-04-12 NOTE — PROGRESS NOTES
[place] [facility]  [place] Medical Record Number:  [id number]  Place of Service where encounter took place:  [facility]NN[facility] ([facility]) [[id number]]  Chief Complaint   Patient presents with     RECHECK       HPI:    Antonio Ramirez  is a 74 year old (1945), who is being seen today for an episodic care visit.  HPI information obtained from: facility chart records, facility staff, patient report and [facility] chart review.   He came to this facility 3/14/2019 for short term rehab and medical management following hospitalization with LLE pain and hematoma after a mechanical fall. Complex medical history including alcoholic liver cirrhosis, CAD, HTN, afib, CHF, antiphospholipid Ab, chronic anticoagulation with Xarelto, history of PE and DVT with IVC filter, history of MSSA abscess of left  stump in 12/2018.   INR was 7.45 in the ED and vitamin K was given. Hgb ws 6.3 and he was transfused. CT left hip showed muscular hematomas, no fracture. MRI lumbar spine with multiple level degenerative disc disease with severe stenosis.   He became febrile and was found to have MSSA bacteremia of unclear source. ID consulted and he discharged on IV Ancef for 22 days. ESPERANZA negative for valvular vegetation. He underwent paracentesis 3/11/2019. GI consulted and EGD was negative for varices. Xarelto was held and resumed at discharge.       Today's concern is:     Alcoholic cirrhosis of liver with ascites (H)-ascites and scrotal edema improved after paracentesis 4/10/2019. 3 L removed. Feeling well. Mild shortness of breath with exertion is unchanged.   Chronic systolic congestive heart failure (H)-weights vary greatly and aren't reliable.   Ischemic cardiomyopathy  CKD (chronic kidney disease) stage 3, GFR 30-59 ml/min (H)  MSSA bacteremia-completed Ancef 4/2/2019. Follow up blood cultures with no growth to date. Afebrile. LUE edema and hematoma from  PICC infiltration is improving.   Paroxysmal atrial  fibrillation (H)-HR: 54-80  History of pulmonary embolism  Essential hypertension-BPs: 114/66, 128/71, 108/57, 112/62  Anemia, unspecified type  Cognitive impairment-SLUMS 21/30, CPT 4.8/5.6  Conversation is repetitive.   Physical deconditioning-ambulating 110 ft with walker and stand by assist, improved from 40 ft last week. Requires mod to max assist with cares    Past Medical and Surgical History reviewed in Epic today.    MEDICATIONS:  Current Outpatient Medications   Medication Sig Dispense Refill     carvedilol (COREG) 3.125 MG tablet TAKE 1 TABLET(3.125 MG) BY MOUTH TWICE DAILY WITH MEALS 180 tablet 0     cyanocobalamin (VITAMIN B-12) 500 MCG SUBL sublingual tablet Place 2 tablets (1,000 mcg) under the tongue daily       fluticasone (FLONASE) 50 MCG/ACT nasal spray Spray 1 spray into both nostrils daily as needed       folic acid (FOLVITE) 1 MG tablet Take 1 tablet (1 mg) by mouth daily       gabapentin (NEURONTIN) 600 MG tablet Take 1 tablet (600 mg) by mouth 2 times daily 60 tablet 11     HYDROcodone-acetaminophen (NORCO) 5-325 MG tablet Take 1 tablet by mouth every 6 hours as needed for pain (for left arm pain/LUE)       hydrocortisone (CORTAID) 1 % external cream Apply topically 2 times daily as needed       ipratropium (ATROVENT) 0.06 % nasal spray Spray 2 sprays into both nostrils 4 times daily       melatonin 3 MG tablet Take 1 tablet (3 mg) by mouth nightly as needed for sleep       miconazole (MICATIN/MICRO GUARD) 2 % external powder Apply topically every hour as needed for other (topical candidiasis)       multivitamin, therapeutic (THERA-VIT) TABS tablet Take 1 tablet by mouth daily       polyethylene glycol (MIRALAX/GLYCOLAX) packet Take 17 g by mouth daily as needed for constipation       ranitidine (ZANTAC) 150 MG tablet Take 1 tablet (150 mg) by mouth daily       rivaroxaban ANTICOAGULANT (XARELTO) 20 MG TABS tablet Take 20 mg by mouth daily (with dinner)       senna-docusate  (SENOKOT-S/PERICOLACE) 8.6-50 MG tablet Take 1 tablet by mouth 2 times daily Hold for loose stools.       spironolactone (ALDACTONE) 25 MG tablet Take 12.5 mg by mouth daily       torsemide (DEMADEX) 20 MG tablet Take 20 mg by mouth 2 times daily        vitamin B1 (THIAMINE) 100 MG tablet Take 1 tablet (100 mg) by mouth daily         REVIEW OF SYSTEMS:  4 point ROS including Respiratory, CV, GI and , other than that noted in the HPI,  is negative    Objective:  /66   Pulse 54   Temp 97.7  F (36.5  C)   Resp 18   Ht 1.829 m (6')   Wt 118.3 kg (260 lb 12.8 oz)   SpO2 94%   BMI 35.37 kg/m    Exam:  GENERAL APPEARANCE:  Alert, in no distress  ENT:  Mouth and posterior oropharynx normal, moist mucous membranes, normal hearing acuity  EYES:  EOM normal, conjunctiva and lids normal  NECK:  No adenopathy,masses or thyromegaly  RESP:  respiratory effort and palpation of chest normal, no respiratory distress, diminished breath sounds bilaterally, no crackles or wheezes  CV:   regular rate and rhythm, 3/6 murmur,  1+ edema RLE with +pedal pulses  ABDOMEN:  moderate ascites, soft, non-tender, no distension, no masses. Moderate scrotal edema.   M/S:   wheelchair.  Wearing LLE prosthesis.  STRTATON with good strength. Mild LUE edema with fading hematoma, no warmth or erythema.   SKIN:  no rashes or open areas  PSYCH:  oriented X 3, memory and insight  impaired, affect normal    Labs:   Last Comprehensive Metabolic Panel:  Sodium   Date Value Ref Range Status   04/12/2019 137 133 - 144 mmol/L Final     Potassium   Date Value Ref Range Status   04/12/2019 4.7 3.4 - 5.3 mmol/L Final     Chloride   Date Value Ref Range Status   04/12/2019 103 94 - 109 mmol/L Final     Carbon Dioxide   Date Value Ref Range Status   04/12/2019 31 20 - 32 mmol/L Final     Anion Gap   Date Value Ref Range Status   04/12/2019 3 3 - 14 mmol/L Final     Glucose   Date Value Ref Range Status   04/12/2019 78 70 - 99 mg/dL Final     Urea Nitrogen  "  Date Value Ref Range Status   04/12/2019 41 (H) 7 - 30 mg/dL Final     Creatinine   Date Value Ref Range Status   04/12/2019 1.71 (H) 0.66 - 1.25 mg/dL Final     GFR Estimate   Date Value Ref Range Status   04/12/2019 38 (L) >60 mL/min/[1.73_m2] Final     Comment:     Non  GFR Calc  Starting 12/18/2018, serum creatinine based estimated GFR (eGFR) will be   calculated using the Chronic Kidney Disease Epidemiology Collaboration   (CKD-EPI) equation.       Calcium   Date Value Ref Range Status   04/12/2019 8.7 8.5 - 10.1 mg/dL Final     Lab Results   Component Value Date    WBC 4.1 04/01/2019     Lab Results   Component Value Date    RBC 2.80 04/01/2019     Lab Results   Component Value Date    HGB 8.6 04/01/2019     Lab Results   Component Value Date    HCT 27.0 04/01/2019     Lab Results   Component Value Date    MCV 96 04/01/2019     Lab Results   Component Value Date    MCH 30.7 04/01/2019     Lab Results   Component Value Date    MCHC 31.9 04/01/2019     Lab Results   Component Value Date    RDW 14.9 04/01/2019     Lab Results   Component Value Date     04/01/2019     ASSESSMENT / PLAN:  (K70.31) Alcoholic cirrhosis of liver with ascites (H)  (primary encounter diagnosis)  Comment: ascites and scrotal edema improved after paracentesis and starting spironolactone. We again discussed establishing care with GI, which he declines.  \"My liver will regenerate. I haven't had a drink in 2 months.\"   Plan: continue thiamine, MVI, folate. Continue torsemide, spironolactone. Repeat paracentesis next week prior to discharge home. Hold Xarelto the day before paracentesis.   Attempted to reach his wife without success.     (I50.22) Chronic systolic congestive heart failure (H)  (I25.5) Ischemic cardiomyopathy  Comment: volume status improved. He declines establishing care with [facility].   Plan: continue torsemide, spironolactone, carvedilol.     (N18.3) CKD (chronic kidney disease) stage 3, GFR 30-59 " ml/min (H)  Comment: creatinine improved  Plan: continue diuretics. Avoid nephrotoxins. Follow BMP    (R78.81) MSSA bacteremia  Comment: appears resolved.Follow  up blood cultures with no growth  Plan: monitor VS, symptoms     (I48.0) Paroxysmal atrial fibrillation (H)  (Z86.711) History of pulmonary embolism  Comment: rate controlled  Plan: continue carvedilol and Xarelto.     (I10) Essential hypertension  Comment: hypotension has improved  Plan: continue carvedilol for rate control, torsemide and spironolactone for cirrhosis and CHF. Monitor VS    (D64.9) Anemia, unspecified type  Comment: chronic, unchanged  Plan: follow Hgb    (R41.89) Cognitive impairment  Comment: mild deficits on cognitive testing. Lacks insight re: his medical issues  Plan: supportive care    (R53.81) Physical deconditioning  Comment: progressing in therapies  Plan: continue PHYSICAL THERAPY/OT. Goal is to discharge home late next week.       Electronically signed by:  DIPAK Kennedy CNP

## 2019-04-12 NOTE — LETTER
4/12/2019        RE: Antonio Ramirez  [address]  [place] [address]        [facility]  [facility] Record Number:  [id number]  [facility] of [facility] encounter took place:  [facility] [[id number]]  Chief Complaint   Patient presents with     RECHECK       HPI:    Antonio Ramirez  is a 74 year old (1945), who is being seen today for an episodic care visit.  HPI information obtained from: facility chart records, facility staff, patient report and [facility] chart review.   He came to this facility 3/14/2019 for short term rehab and medical management following hospitalization with LLE pain and hematoma after a mechanical fall. Complex medical history including alcoholic liver cirrhosis, CAD, HTN, afib, CHF, antiphospholipid Ab, chronic anticoagulation with Xarelto, history of PE and DVT with IVC filter, history of MSSA abscess of left  stump in 12/2018.   INR was 7.45 in the ED and vitamin K was given. Hgb ws 6.3 and he was transfused. CT left hip showed muscular hematomas, no fracture. MRI lumbar spine with multiple level degenerative disc disease with severe stenosis.   He became febrile and was found to have MSSA bacteremia of unclear source. ID consulted and he discharged on IV Ancef for 22 days. ESPERANZA negative for valvular vegetation. He underwent paracentesis 3/11/2019. GI consulted and EGD was negative for varices. Xarelto was held and resumed at discharge.       Today's concern is:     Alcoholic cirrhosis of liver with ascites (H)-ascites and scrotal edema improved after paracentesis 4/10/2019. 3 L removed. Feeling well. Mild shortness of breath with exertion is unchanged.   Chronic systolic congestive heart failure (H)-weights vary greatly and aren't reliable.   Ischemic cardiomyopathy  CKD (chronic kidney disease) stage 3, GFR 30-59 ml/min (H)  MSSA bacteremia-completed Ancef 4/2/2019. Follow up blood cultures with no growth to date.  Afebrile. LUE edema and hematoma from  PICC infiltration is improving.   Paroxysmal atrial fibrillation (H)-HR: 54-80  History of pulmonary embolism  Essential hypertension-BPs: 114/66, 128/71, 108/57, 112/62  Anemia, unspecified type  Cognitive impairment-SLUMS 21/30, CPT 4.8/5.6  Conversation is repetitive.   Physical deconditioning-ambulating 110 ft with walker and stand by assist, improved from 40 ft last week. Requires mod to max assist with cares    Past Medical and Surgical History reviewed in Epic today.    MEDICATIONS:  Current Outpatient Medications   Medication Sig Dispense Refill     carvedilol (COREG) 3.125 MG tablet TAKE 1 TABLET(3.125 MG) BY MOUTH TWICE DAILY WITH MEALS 180 tablet 0     cyanocobalamin (VITAMIN B-12) 500 MCG SUBL sublingual tablet Place 2 tablets (1,000 mcg) under the tongue daily       fluticasone (FLONASE) 50 MCG/ACT nasal spray Spray 1 spray into both nostrils daily as needed       folic acid (FOLVITE) 1 MG tablet Take 1 tablet (1 mg) by mouth daily       gabapentin (NEURONTIN) 600 MG tablet Take 1 tablet (600 mg) by mouth 2 times daily 60 tablet 11     HYDROcodone-acetaminophen (NORCO) 5-325 MG tablet Take 1 tablet by mouth every 6 hours as needed for pain (for left arm pain/LUE)       hydrocortisone (CORTAID) 1 % external cream Apply topically 2 times daily as needed       ipratropium (ATROVENT) 0.06 % nasal spray Spray 2 sprays into both nostrils 4 times daily       melatonin 3 MG tablet Take 1 tablet (3 mg) by mouth nightly as needed for sleep       miconazole (MICATIN/MICRO GUARD) 2 % external powder Apply topically every hour as needed for other (topical candidiasis)       multivitamin, therapeutic (THERA-VIT) TABS tablet Take 1 tablet by mouth daily       polyethylene glycol (MIRALAX/GLYCOLAX) packet Take 17 g by mouth daily as needed for constipation       ranitidine (ZANTAC) 150 MG tablet Take 1 tablet (150 mg) by mouth daily       rivaroxaban ANTICOAGULANT (XARELTO) 20 MG  TABS tablet Take 20 mg by mouth daily (with dinner)       senna-docusate (SENOKOT-S/PERICOLACE) 8.6-50 MG tablet Take 1 tablet by mouth 2 times daily Hold for loose stools.       spironolactone (ALDACTONE) 25 MG tablet Take 12.5 mg by mouth daily       torsemide (DEMADEX) 20 MG tablet Take 20 mg by mouth 2 times daily        vitamin B1 (THIAMINE) 100 MG tablet Take 1 tablet (100 mg) by mouth daily         REVIEW OF SYSTEMS:  4 point ROS including Respiratory, CV, GI and , other than that noted in the HPI,  is negative    Objective:  /66   Pulse 54   Temp 97.7  F (36.5  C)   Resp 18   Ht 1.829 m (6')   Wt 118.3 kg (260 lb 12.8 oz)   SpO2 94%   BMI 35.37 kg/m     Exam:  GENERAL APPEARANCE:  Alert, in no distress  ENT:  Mouth and posterior oropharynx normal, moist mucous membranes, normal hearing acuity  EYES:  EOM normal, conjunctiva and lids normal  NECK:  No adenopathy,masses or thyromegaly  RESP:  respiratory effort and palpation of chest normal, no respiratory distress, diminished breath sounds bilaterally, no crackles or wheezes  CV:   regular rate and rhythm, 3/6 murmur,  1+ edema RLE with +pedal pulses  ABDOMEN:  moderate ascites, soft, non-tender, no distension, no masses. Moderate scrotal edema.   M/S:   wheelchair.  Wearing LLE prosthesis.  STRATTON with good strength. Mild LUE edema with fading hematoma, no warmth or erythema.   SKIN:  no rashes or open areas  PSYCH:  oriented X 3, memory and insight  impaired, affect normal    Labs:   Last Comprehensive Metabolic Panel:  Sodium   Date Value Ref Range Status   04/12/2019 137 133 - 144 mmol/L Final     Potassium   Date Value Ref Range Status   04/12/2019 4.7 3.4 - 5.3 mmol/L Final     Chloride   Date Value Ref Range Status   04/12/2019 103 94 - 109 mmol/L Final     Carbon Dioxide   Date Value Ref Range Status   04/12/2019 31 20 - 32 mmol/L Final     Anion Gap   Date Value Ref Range Status   04/12/2019 3 3 - 14 mmol/L Final     Glucose   Date Value  "Ref Range Status   04/12/2019 78 70 - 99 mg/dL Final     Urea Nitrogen   Date Value Ref Range Status   04/12/2019 41 (H) 7 - 30 mg/dL Final     Creatinine   Date Value Ref Range Status   04/12/2019 1.71 (H) 0.66 - 1.25 mg/dL Final     GFR Estimate   Date Value Ref Range Status   04/12/2019 38 (L) >60 mL/min/[1.73_m2] Final     Comment:     Non  GFR Calc  Starting 12/18/2018, serum creatinine based estimated GFR (eGFR) will be   calculated using the Chronic Kidney Disease Epidemiology Collaboration   (CKD-EPI) equation.       Calcium   Date Value Ref Range Status   04/12/2019 8.7 8.5 - 10.1 mg/dL Final     Lab Results   Component Value Date    WBC 4.1 04/01/2019     Lab Results   Component Value Date    RBC 2.80 04/01/2019     Lab Results   Component Value Date    HGB 8.6 04/01/2019     Lab Results   Component Value Date    HCT 27.0 04/01/2019     Lab Results   Component Value Date    MCV 96 04/01/2019     Lab Results   Component Value Date    MCH 30.7 04/01/2019     Lab Results   Component Value Date    MCHC 31.9 04/01/2019     Lab Results   Component Value Date    RDW 14.9 04/01/2019     Lab Results   Component Value Date     04/01/2019     ASSESSMENT / PLAN:  (K70.31) Alcoholic cirrhosis of liver with ascites (H)  (primary encounter diagnosis)  Comment: ascites and scrotal edema improved after paracentesis and starting spironolactone. We again discussed establishing care with GI, which he declines.  \"My liver will regenerate. I haven't had a drink in 2 months.\"   Plan: continue thiamine, MVI, folate. Continue torsemide, spironolactone. Repeat paracentesis next week prior to discharge home. Hold Xarelto the day before paracentesis.   Attempted to reach his wife without success.     (I50.22) Chronic systolic congestive heart failure (H)  (I25.5) Ischemic cardiomyopathy  Comment: volume status improved. He declines establishing care with [facility].   Plan: continue torsemide, spironolactone, " carvedilol.     (N18.3) CKD (chronic kidney disease) stage 3, GFR 30-59 ml/min (H)  Comment: creatinine improved  Plan: continue diuretics. Avoid nephrotoxins. Follow BMP    (R78.81) MSSA bacteremia  Comment: appears resolved.Follow  up blood cultures with no growth  Plan: monitor VS, symptoms     (I48.0) Paroxysmal atrial fibrillation (H)  (Z86.711) History of pulmonary embolism  Comment: rate controlled  Plan: continue carvedilol and Xarelto.     (I10) Essential hypertension  Comment: hypotension has improved  Plan: continue carvedilol for rate control, torsemide and spironolactone for cirrhosis and CHF. Monitor VS    (D64.9) Anemia, unspecified type  Comment: chronic, unchanged  Plan: follow Hgb    (R41.89) Cognitive impairment  Comment: mild deficits on cognitive testing. Lacks insight re: his medical issues  Plan: supportive care    (R53.81) Physical deconditioning  Comment: progressing in therapies  Plan: continue PHYSICAL THERAPY/OT. Goal is to discharge home late next week.       Electronically signed by:  DIPAK Kennedy CNP      Sincerely,        DIPAK Kennedy CNP

## 2019-04-16 ENCOUNTER — HOSPITAL LABORATORY (OUTPATIENT)
Dept: OTHER | Facility: CLINIC | Age: 74
End: 2019-04-16

## 2019-04-16 LAB
ANION GAP SERPL CALCULATED.3IONS-SCNC: 6 MMOL/L (ref 3–14)
BUN SERPL-MCNC: 41 MG/DL (ref 7–30)
CALCIUM SERPL-MCNC: 8.8 MG/DL (ref 8.5–10.1)
CHLORIDE SERPL-SCNC: 104 MMOL/L (ref 94–109)
CO2 SERPL-SCNC: 28 MMOL/L (ref 20–32)
CREAT SERPL-MCNC: 1.7 MG/DL (ref 0.66–1.25)
GFR SERPL CREATININE-BSD FRML MDRD: 38 ML/MIN/{1.73_M2}
GLUCOSE SERPL-MCNC: 69 MG/DL (ref 70–99)
HGB BLD-MCNC: 9 G/DL (ref 13.3–17.7)
POTASSIUM SERPL-SCNC: 4.4 MMOL/L (ref 3.4–5.3)
SODIUM SERPL-SCNC: 138 MMOL/L (ref 133–144)

## 2019-04-17 ENCOUNTER — HOSPITAL ENCOUNTER (OUTPATIENT)
Facility: CLINIC | Age: 74
Discharge: HOME | End: 2019-04-17
Admitting: PHYSICIAN ASSISTANT
Payer: MEDICARE

## 2019-04-17 ENCOUNTER — HOSPITAL ENCOUNTER (OUTPATIENT)
Dept: ULTRASOUND IMAGING | Facility: CLINIC | Age: 74
Discharge: HOME | End: 2019-04-17
Attending: NURSE PRACTITIONER | Admitting: NURSE PRACTITIONER
Payer: MEDICARE

## 2019-04-17 VITALS
DIASTOLIC BLOOD PRESSURE: 46 MMHG | TEMPERATURE: 97.8 F | HEART RATE: 54 BPM | SYSTOLIC BLOOD PRESSURE: 103 MMHG | OXYGEN SATURATION: 97 % | WEIGHT: 220 LBS | BODY MASS INDEX: 29.16 KG/M2 | RESPIRATION RATE: 16 BRPM | HEIGHT: 73 IN

## 2019-04-17 DIAGNOSIS — K70.31 ALCOHOLIC CIRRHOSIS OF LIVER WITH ASCITES (H): ICD-10-CM

## 2019-04-17 PROCEDURE — 40000863 ZZH STATISTIC RADIOLOGY XRAY, US, CT, MAR, NM

## 2019-04-17 PROCEDURE — 27210190 US PARACENTESIS

## 2019-04-17 PROCEDURE — 25000125 ZZHC RX 250: Performed by: RADIOLOGY

## 2019-04-17 RX ORDER — DEXTROSE MONOHYDRATE 25 G/50ML
25-50 INJECTION, SOLUTION INTRAVENOUS
Status: DISCONTINUED | OUTPATIENT
Start: 2019-04-17 | End: 2019-04-17 | Stop reason: HOSPADM

## 2019-04-17 RX ORDER — NICOTINE POLACRILEX 4 MG
15-30 LOZENGE BUCCAL
Status: DISCONTINUED | OUTPATIENT
Start: 2019-04-17 | End: 2019-04-17 | Stop reason: HOSPADM

## 2019-04-17 RX ORDER — LIDOCAINE 40 MG/G
CREAM TOPICAL
Status: DISCONTINUED | OUTPATIENT
Start: 2019-04-17 | End: 2019-04-17 | Stop reason: HOSPADM

## 2019-04-17 RX ORDER — LIDOCAINE HYDROCHLORIDE 10 MG/ML
10 INJECTION, SOLUTION EPIDURAL; INFILTRATION; INTRACAUDAL; PERINEURAL ONCE
Status: COMPLETED | OUTPATIENT
Start: 2019-04-17 | End: 2019-04-17

## 2019-04-17 RX ADMIN — LIDOCAINE HYDROCHLORIDE 10 ML: 10 INJECTION, SOLUTION EPIDURAL; INFILTRATION; INTRACAUDAL; PERINEURAL at 14:37

## 2019-04-17 ASSESSMENT — MIFFLIN-ST. JEOR: SCORE: 1781.79

## 2019-04-17 NOTE — PROGRESS NOTES
Paracentesis:     Pt tolerated well. VSS. 4,000 cc strawberry colored fluid removed from abdomen w/o difficulty. Bandaid applied to site - CDI.     Pt back [facility]. Will discharge back to [place], via wheelchair, accompanied by wife.

## 2019-04-17 NOTE — PROGRESS NOTES
RADIOLOGY PROCEDURE NOTE  Patient name: Antonio Ramirez  MRN: [id number]  : 1945    Pre-procedure diagnosis: ascites  Post-procedure diagnosis: Same    Procedure Date/Time: 2019  2:49 PM  Procedure: paracentesis  Estimated blood loss: None  Specimen(s) collected with description: ascitic fluid  The patient tolerated the procedure well with no immediate complications.  Significant findings: none    See imaging dictation for procedural details.    Provider name: Shirley Brody  Assistant(s):None

## 2019-04-17 NOTE — DISCHARGE INSTRUCTIONS

## 2019-04-18 ENCOUNTER — DISCHARGE SUMMARY NURSING HOME (OUTPATIENT)
Dept: GERIATRICS | Facility: CLINIC | Age: 74
End: 2019-04-18
Payer: MEDICARE

## 2019-04-18 VITALS
OXYGEN SATURATION: 94 % | WEIGHT: 237.8 LBS | HEIGHT: 72 IN | TEMPERATURE: 98 F | BODY MASS INDEX: 32.21 KG/M2 | RESPIRATION RATE: 18 BRPM | HEART RATE: 60 BPM | SYSTOLIC BLOOD PRESSURE: 104 MMHG | DIASTOLIC BLOOD PRESSURE: 66 MMHG

## 2019-04-18 DIAGNOSIS — K70.31 ALCOHOLIC CIRRHOSIS OF LIVER WITH ASCITES (H): ICD-10-CM

## 2019-04-18 DIAGNOSIS — I50.22 CHRONIC SYSTOLIC CONGESTIVE HEART FAILURE (H): ICD-10-CM

## 2019-04-18 DIAGNOSIS — M79.89 LEFT UPPER EXTREMITY SWELLING: ICD-10-CM

## 2019-04-18 DIAGNOSIS — R53.81 PHYSICAL DECONDITIONING: ICD-10-CM

## 2019-04-18 DIAGNOSIS — S80.12XD HEMATOMA OF LEFT LOWER EXTREMITY, SUBSEQUENT ENCOUNTER: ICD-10-CM

## 2019-04-18 DIAGNOSIS — G54.6 PHANTOM LIMB PAIN (H): ICD-10-CM

## 2019-04-18 DIAGNOSIS — D64.9 ANEMIA, UNSPECIFIED TYPE: ICD-10-CM

## 2019-04-18 DIAGNOSIS — R41.89 COGNITIVE IMPAIRMENT: ICD-10-CM

## 2019-04-18 DIAGNOSIS — N18.30 CKD (CHRONIC KIDNEY DISEASE) STAGE 3, GFR 30-59 ML/MIN (H): ICD-10-CM

## 2019-04-18 DIAGNOSIS — I10 ESSENTIAL HYPERTENSION: ICD-10-CM

## 2019-04-18 DIAGNOSIS — R78.81 MSSA BACTEREMIA: Primary | ICD-10-CM

## 2019-04-18 DIAGNOSIS — I48.0 PAROXYSMAL ATRIAL FIBRILLATION (H): ICD-10-CM

## 2019-04-18 DIAGNOSIS — I25.5 ISCHEMIC CARDIOMYOPATHY: ICD-10-CM

## 2019-04-18 DIAGNOSIS — Z86.711 HISTORY OF PULMONARY EMBOLISM: ICD-10-CM

## 2019-04-18 DIAGNOSIS — I25.10 CORONARY ARTERY DISEASE INVOLVING NATIVE CORONARY ARTERY OF NATIVE HEART WITHOUT ANGINA PECTORIS: ICD-10-CM

## 2019-04-18 DIAGNOSIS — B95.61 MSSA BACTEREMIA: Primary | ICD-10-CM

## 2019-04-18 PROCEDURE — 99316 NF DSCHRG MGMT 30 MIN+: CPT | Performed by: NURSE PRACTITIONER

## 2019-04-18 ASSESSMENT — MIFFLIN-ST. JEOR: SCORE: 1846.65

## 2019-04-18 NOTE — PROGRESS NOTES
[facility] DISCHARGE SUMMARY  PATIENT'S NAME: Antonio Ramirez  YOB: 1945  MEDICAL RECORD NUMBER:  [id number]  [facility] where encounter took place[facility] [[id number]]    PRIMARY CARE PROVIDER AND CLINIC RESPONSIBLE AFTER TRANSFER:   Main Hardy MD, [address] / [place] [address]    [facility] Provider     Transferring providers: DIPAK Kennedy CNP, Shelia Watson MD  Recent Hospitalization/ED:  [facility] Hospital stay 2/18/2019 to 3/14/2019.  Date of SNF Admission: March / 14 / 2019  Date of SNF (anticipated) Discharge: April / 21 / 2019  Discharged to: previous independent home and with family Wife  Cognitive Scores: SLUMS: 21/30 and CPT: 4.8/6.0  DME: Wheelchair and Walker    CODE STATUS/ADVANCE DIRECTIVES DISCUSSION:  Full Code   ALLERGIES: Ciprofloxacin and Hmg-coa-r inhibitors    DISCHARGE DIAGNOSIS/NURSING FACILITY COURSE:   He came to this facility for short term rehab and medical management following hospitalization with LLE pain and hematoma after a mechanical fall. Complex medical history including alcoholic liver cirrhosis, CAD, HTN, afib, CHF, antiphospholipid Ab, chronic anticoagulation with Xarelto, history of PE and DVT with IVC filter, history of MSSA abscess of left  stump in 12/2018.   INR was 7.45 in the ED and vitamin K was given. Hgb ws 6.3 and he was transfused. CT left hip showed muscular hematomas, no fracture. MRI lumbar spine with multiple level degenerative disc disease with severe stenosis. He became febrile and was found to have MSSA bacteremia of unclear source. ID consulted and he discharged on IV Ancef for 22 days. ESPERANZA negative for valvular vegetation. He underwent paracentesis 3/11/2019. GI consulted and EGD was negative for varices. Xarelto was held and resumed at hospital discharge.     He has worked with PHYSICAL THERAPY and OT with slow, but good progress. Ambulating 110  "ft with walker and stand by assist. TUG 66 seconds, indicating high fall risk. Requires assist with all cares.   ASSESSMENT / PLAN:  (R78.81) MSSA bacteremia  (primary encounter diagnosis)  Comment: completed Ancef 4/2/2019, infection resolved. Follow up blood cultures done 4/8/2019 with no growth. He's back to baseline status and eager to return home.   Plan: discharge home 4/20/2019. Wife assists with cares and meds. Home services and follow up as below.     (K70.31) Alcoholic cirrhosis of liver with ascites (H)  Comment: he developed significant volume overload  with ascites and scrotal edema. Has had outpatient paracentesis X 3, most recently 4/17/2019. Spironolactone was started with improvement. Diuresis is limited by CKD. Discussed establishing care with GI, which he declines, \"My liver will regenerate. I haven't had a drink in 2 months.\"  Plan: continue torsemide, spironolactone. Continue thiamine, folate, MVI.     (I50.22) Chronic systolic congestive heart failure (H)  (I25.5) Ischemic cardiomyopathy  Comment: volume status improved with addition of spironolactone and paracentesis. Diuresis limited by renal function. Weights have been inconsistent, but is back to tcu admission weight of 237 lbs. We have discussed establishing care with [facility],which he declines.   Plan: continue torsemide, spironolactone, carvedilol. Daily weights.     (I48.0) Paroxysmal atrial fibrillation (H)  (Z86.711) History of pulmonary embolism  Comment: Xarelto was held while on IV antibiotic due to bleeding at IV site and anemia. Resumed 4/7/2019 (held prior to paracentesis).  No s/s of active bleeding. Rate controlled: 61-80  Plan: continue carvedilol and Xarelto.     (N18.3) CKD (chronic kidney disease) stage 3, GFR 30-59 ml/min (H)  Comment: creatinine was up to 1.98 and has slowly come back down to baseline.   Plan: avoid nephrotoxins.     (G54.6) Phantom limb pain (H)  (S80.12XD) Hematoma of left lower extremity, " subsequent encounter  Comment: hematoma resolved and pain has improved  Plan: continue Norco, gabapentin.     (I25.10) Coronary artery disease involving native coronary artery of native heart without angina pectoris h/o CABG  Comment: no acute issues  Plan: continue current meds. Follow up with Cardiology per usual schedule.     (I10) Essential hypertension  Comment: hypotension early in tcu stay has improved. Recent BPs: 104/66, 103/68, 113/68  Plan: continue carvedilol for rate control, torsemide and spironolactone for cirrhosis and CHF.     (D64.9) Anemia, unspecified type  Comment: anemia of chronic disease, Hgb unchanged  Plan: follow up labs per PCP    (M79.89) Left upper extremity swelling  Comment: related to infiltrated IV, improved  Plan: monitor    (R41.89) Cognitive impairment  Comment: mild to moderate deficits on cognitive testing. Poor insight re:  medical issues.   Plan: supportive care. Wife manages his meds.     (R53.81) Physical deconditioning  Comment: progress in therapies as above. Functional status is back to baseline  Plan: home therapies for ongoing gait training, strengthening and ADL safety      Past Medical History:  has a past medical history of Alcoholism (H), Amputated left leg (H), Anemia, Anticardiolipin antibody syndrome (H), Antiphospholipid antibody syndrome (H), Antiplatelet or antithrombotic long-term use, Aortic root dilatation (H), Aortic stenosis, Arthritis, Balance problem, Coronary artery disease, Gastro-oesophageal reflux disease, Heart attack (H) (2007), Hiatal hernia, Hypercholesterolemia, Hypertension, Ischemic cardiomyopathy, Left foot drop, Liver disease, 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), Moderate mitral regurgitation, Monoclonal gammopathy, Neuropathy, Noninfectious ileitis, Nonrheumatic tricuspid valve regurgitation, Paroxysmal atrial fibrillation (H), PE (pulmonary embolism) (2006), Prostate cancer (H) (2000), Pulmonary hypertension (H), Renal  disease, Syncope, Thrombocytopenia (H), Urethral stricture, and Venous thrombosis.    Discharge Medications:  Current Outpatient Medications   Medication Sig Dispense Refill     carvedilol (COREG) 3.125 MG tablet TAKE 1 TABLET(3.125 MG) BY MOUTH TWICE DAILY WITH MEALS 180 tablet 0     cyanocobalamin (VITAMIN B-12) 500 MCG SUBL sublingual tablet Place 2 tablets (1,000 mcg) under the tongue daily       fluticasone (FLONASE) 50 MCG/ACT nasal spray Spray 1 spray into both nostrils daily as needed       folic acid (FOLVITE) 1 MG tablet Take 1 tablet (1 mg) by mouth daily       gabapentin (NEURONTIN) 600 MG tablet Take 1 tablet (600 mg) by mouth 2 times daily 60 tablet 11     HYDROcodone-acetaminophen (NORCO) 5-325 MG tablet Take 1 tablet by mouth every 6 hours as needed for pain (for left arm pain/LUE)       hydrocortisone (CORTAID) 1 % external cream Apply topically 2 times daily as needed       ipratropium (ATROVENT) 0.06 % nasal spray Spray 2 sprays into both nostrils 4 times daily       melatonin 3 MG tablet Take 1 tablet (3 mg) by mouth nightly as needed for sleep       multivitamin, therapeutic (THERA-VIT) TABS tablet Take 1 tablet by mouth daily       ranitidine (ZANTAC) 150 MG tablet Take 1 tablet (150 mg) by mouth daily       rivaroxaban ANTICOAGULANT (XARELTO) 20 MG TABS tablet Take 20 mg by mouth daily (with dinner)       senna-docusate (SENOKOT-S/PERICOLACE) 8.6-50 MG tablet Take 1 tablet by mouth daily as needed Hold for loose stools.        spironolactone (ALDACTONE) 25 MG tablet Take 12.5 mg by mouth daily       torsemide (DEMADEX) 20 MG tablet Take 20 mg by mouth 2 times daily        vitamin B1 (THIAMINE) 100 MG tablet Take 1 tablet (100 mg) by mouth daily       Medication Changes/Rationale:   Ancef completed 4/2/2019  Xarelto resumed 4/7/2109, held prior to paracentesis  Spironolactone started 4/10/2019    Controlled medications sent with patient:   Medication: Norco , 20 tabs given to patient at the  time of discharge to take home     ROS:   4 point ROS including Respiratory, CV, GI and , other than that noted in the HPI,  is negative    Physical Exam:   Vitals: /66   Pulse 60   Temp 98  F (36.7  C)   Resp 18   Ht 1.829 m (6')   Wt 107.9 kg (237 lb 12.8 oz)   SpO2 94%   BMI 32.25 kg/m    BMI= Body mass index is 32.25 kg/m .  GENERAL APPEARANCE:  Alert, in no distress  ENT:  Mouth and posterior oropharynx normal, moist mucous membranes, normal hearing acuity  EYES:  EOM normal, conjunctiva and lids normal  NECK:  No adenopathy,masses or thyromegaly  RESP:  respiratory effort and palpation of chest normal, no respiratory distress, diminished breath sounds bilaterally, no crackles or wheezes  CV:   regular rate and rhythm, 3/6 murmur, trace edema RLE with +pedal pulses  ABDOMEN:  mild to moderate ascites, soft, non-tender, no distension, no masses. Mild scrotal edema.   M/S:   wheelchair.  Wearing LLE prosthesis.  STRATTON with good strength. Mild LUE edema with fading hematoma, no warmth or erythema.   SKIN:  no rashes or open areas  PSYCH:  oriented X 3, memory and insight  impaired, affect normal      SNF labs  Lab Results   Component Value Date    WBC 4.1 04/01/2019     Lab Results   Component Value Date    RBC 2.80 04/01/2019     Lab Results   Component Value Date    HGB 9.0 04/16/2019     Lab Results   Component Value Date    HCT 27.0 04/01/2019     Lab Results   Component Value Date    MCV 96 04/01/2019     Lab Results   Component Value Date    MCH 30.7 04/01/2019     Lab Results   Component Value Date    MCHC 31.9 04/01/2019     Lab Results   Component Value Date    RDW 14.9 04/01/2019     Lab Results   Component Value Date     04/01/2019     Last Comprehensive Metabolic Panel:  Sodium   Date Value Ref Range Status   04/16/2019 138 133 - 144 mmol/L Final     Potassium   Date Value Ref Range Status   04/16/2019 4.4 3.4 - 5.3 mmol/L Final     Chloride   Date Value Ref Range Status    04/16/2019 104 94 - 109 mmol/L Final     Carbon Dioxide   Date Value Ref Range Status   04/16/2019 28 20 - 32 mmol/L Final     Anion Gap   Date Value Ref Range Status   04/16/2019 6 3 - 14 mmol/L Final     Glucose   Date Value Ref Range Status   04/16/2019 69 (L) 70 - 99 mg/dL Final     Urea Nitrogen   Date Value Ref Range Status   04/16/2019 41 (H) 7 - 30 mg/dL Final     Creatinine   Date Value Ref Range Status   04/16/2019 1.70 (H) 0.66 - 1.25 mg/dL Final     GFR Estimate   Date Value Ref Range Status   04/16/2019 38 (L) >60 mL/min/[1.73_m2] Final     Comment:     Non  GFR Calc  Starting 12/18/2018, serum creatinine based estimated GFR (eGFR) will be   calculated using the Chronic Kidney Disease Epidemiology Collaboration   (CKD-EPI) equation.       Calcium   Date Value Ref Range Status   04/16/2019 8.8 8.5 - 10.1 mg/dL Final         DISCHARGE PLAN:  Follow up labs: per PCP  Medical Follow Up:      Follow up with primary care provider in 1 week  MTM referral needed and placed by this provider: No    Discharge Services: Home Care:  Occupational Therapy, Physical Therapy, Registered Nurse and Home Health Aide with [facility]      TOTAL DISCHARGE TIME:   Greater than 30 minutes  Electronically signed by:  DIPAK Kennedy CNP

## 2019-04-18 NOTE — LETTER
4/18/2019        RE: Antonio Ramirez  [address]  [place] [address]        [facility] DISCHARGE SUMMARY  PATIENT'S NAME: Antonio Ramirez  YOB: 1945  MEDICAL RECORD NUMBER:  [id number]  Place of Service where encounter took place:  [facility] [[id number]]    PRIMARY CARE PROVIDER AND CLINIC RESPONSIBLE AFTER TRANSFER:   Main Hardy MD, [address] / [place] [address]    [facility]     Transferring providers: DIPAK Kennedy CNP, Shelia Watson MD  Recent Hospitalization/ED:  [facility] stay 2/18/2019 to 3/14/2019.  Date of SNF Admission: March / 14 / 2019  Date of SNF (anticipated) Discharge: April / 21 / 2019  Discharged to: previous independent home and with family Wife  Cognitive Scores: SLUMS: 21/30 and CPT: 4.8/6.0  DME: Wheelchair and Walker    CODE STATUS/ADVANCE DIRECTIVES DISCUSSION:  Full Code   ALLERGIES: Ciprofloxacin and Hmg-coa-r inhibitors    DISCHARGE DIAGNOSIS/NURSING FACILITY COURSE:   He came to this facility for short term rehab and medical management following hospitalization with LLE pain and hematoma after a mechanical fall. Complex medical history including alcoholic liver cirrhosis, CAD, HTN, afib, CHF, antiphospholipid Ab, chronic anticoagulation with Xarelto, history of PE and DVT with IVC filter, history of MSSA abscess of left  stump in 12/2018.   INR was 7.45 in the ED and vitamin K was given. Hgb ws 6.3 and he was transfused. CT left hip showed muscular hematomas, no fracture. MRI lumbar spine with multiple level degenerative disc disease with severe stenosis. He became febrile and was found to have MSSA bacteremia of unclear source. ID consulted and he discharged on IV Ancef for 22 days. ESPERANZA negative for valvular vegetation. He underwent paracentesis 3/11/2019. GI consulted and EGD was negative for varices. Xarelto was held and resumed at hospital  "discharge.     He has worked with PHYSICAL THERAPY and OT with slow, but good progress. Ambulating 110 ft with walker and stand by assist. TUG 66 seconds, indicating high fall risk. Requires assist with all cares.   ASSESSMENT / PLAN:  (R78.81) MSSA bacteremia  (primary encounter diagnosis)  Comment: completed Ancef 4/2/2019, infection resolved. Follow up blood cultures done 4/8/2019 with no growth. He's back to baseline status and eager to return home.   Plan: discharge home 4/20/2019. Wife assists with cares and meds. Home services and follow up as below.     (K70.31) Alcoholic cirrhosis of liver with ascites (H)  Comment: he developed significant volume overload  with ascites and scrotal edema. Has had outpatient paracentesis X 3, most recently 4/17/2019. Spironolactone was started with improvement. Diuresis is limited by CKD. Discussed establishing care with GI, which he declines, \"My liver will regenerate. I haven't had a drink in 2 months.\"  Plan: continue torsemide, spironolactone. Continue thiamine, folate, MVI.     (I50.22) Chronic systolic congestive heart failure (H)  (I25.5) Ischemic cardiomyopathy  Comment: volume status improved with addition of spironolactone and paracentesis. Diuresis limited by renal function. Weights have been inconsistent, but is back to tcu admission weight of 237 lbs. We have discussed establishing care with [facility],which he declines.   Plan: continue torsemide, spironolactone, carvedilol. Daily weights.     (I48.0) Paroxysmal atrial fibrillation (H)  (Z86.711) History of pulmonary embolism  Comment: Xarelto was held while on IV antibiotic due to bleeding at IV site and anemia. Resumed 4/7/2019 (held prior to paracentesis).  No s/s of active bleeding. Rate controlled: 61-80  Plan: continue carvedilol and Xarelto.     (N18.3) CKD (chronic kidney disease) stage 3, GFR 30-59 ml/min (H)  Comment: creatinine was up to 1.98 and has slowly come back down to baseline.   Plan: avoid " nephrotoxins.     (G54.6) Phantom limb pain (H)  (S80.12XD) Hematoma of left lower extremity, subsequent encounter  Comment: hematoma resolved and pain has improved  Plan: continue Norco, gabapentin.     (I25.10) Coronary artery disease involving native coronary artery of native heart without angina pectoris h/o CABG  Comment: no acute issues  Plan: continue current meds. Follow up with Cardiology per usual schedule.     (I10) Essential hypertension  Comment: hypotension early in tcu stay has improved. Recent BPs: 104/66, 103/68, 113/68  Plan: continue carvedilol for rate control, torsemide and spironolactone for cirrhosis and CHF.     (D64.9) Anemia, unspecified type  Comment: anemia of chronic disease, Hgb unchanged  Plan: follow up labs per PCP    (M79.89) Left upper extremity swelling  Comment: related to infiltrated IV, improved  Plan: monitor    (R41.89) Cognitive impairment  Comment: mild to moderate deficits on cognitive testing. Poor insight re:  medical issues.   Plan: supportive care. Wife manages his meds.     (R53.81) Physical deconditioning  Comment: progress in therapies as above. Functional status is back to baseline  Plan: home therapies for ongoing gait training, strengthening and ADL safety      Past Medical History:  has a past medical history of Alcoholism (H), Amputated left leg (H), Anemia, Anticardiolipin antibody syndrome (H), Antiphospholipid antibody syndrome (H), Antiplatelet or antithrombotic long-term use, Aortic root dilatation (H), Aortic stenosis, Arthritis, Balance problem, Coronary artery disease, Gastro-oesophageal reflux disease, Heart attack (H) (2007), Hiatal hernia, Hypercholesterolemia, Hypertension, Ischemic cardiomyopathy, Left foot drop, Liver disease, 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), Moderate mitral regurgitation, Monoclonal gammopathy, Neuropathy, Noninfectious ileitis, Nonrheumatic tricuspid valve regurgitation, Paroxysmal atrial fibrillation (H), PE  (pulmonary embolism) (2006), Prostate cancer (H) (2000), Pulmonary hypertension (H), Renal disease, Syncope, Thrombocytopenia (H), Urethral stricture, and Venous thrombosis.    Discharge Medications:  Current Outpatient Medications   Medication Sig Dispense Refill     carvedilol (COREG) 3.125 MG tablet TAKE 1 TABLET(3.125 MG) BY MOUTH TWICE DAILY WITH MEALS 180 tablet 0     cyanocobalamin (VITAMIN B-12) 500 MCG SUBL sublingual tablet Place 2 tablets (1,000 mcg) under the tongue daily       fluticasone (FLONASE) 50 MCG/ACT nasal spray Spray 1 spray into both nostrils daily as needed       folic acid (FOLVITE) 1 MG tablet Take 1 tablet (1 mg) by mouth daily       gabapentin (NEURONTIN) 600 MG tablet Take 1 tablet (600 mg) by mouth 2 times daily 60 tablet 11     HYDROcodone-acetaminophen (NORCO) 5-325 MG tablet Take 1 tablet by mouth every 6 hours as needed for pain (for left arm pain/LUE)       hydrocortisone (CORTAID) 1 % external cream Apply topically 2 times daily as needed       ipratropium (ATROVENT) 0.06 % nasal spray Spray 2 sprays into both nostrils 4 times daily       melatonin 3 MG tablet Take 1 tablet (3 mg) by mouth nightly as needed for sleep       multivitamin, therapeutic (THERA-VIT) TABS tablet Take 1 tablet by mouth daily       ranitidine (ZANTAC) 150 MG tablet Take 1 tablet (150 mg) by mouth daily       rivaroxaban ANTICOAGULANT (XARELTO) 20 MG TABS tablet Take 20 mg by mouth daily (with dinner)       senna-docusate (SENOKOT-S/PERICOLACE) 8.6-50 MG tablet Take 1 tablet by mouth daily as needed Hold for loose stools.        spironolactone (ALDACTONE) 25 MG tablet Take 12.5 mg by mouth daily       torsemide (DEMADEX) 20 MG tablet Take 20 mg by mouth 2 times daily        vitamin B1 (THIAMINE) 100 MG tablet Take 1 tablet (100 mg) by mouth daily       Medication Changes/Rationale:   Ancef completed 4/2/2019  Xarelto resumed 4/7/2109, held prior to paracentesis  Spironolactone started  4/10/2019    Controlled medications sent with patient:   Medication: Norco , 20 tabs given to patient at the time of discharge to take home     ROS:   4 point ROS including Respiratory, CV, GI and , other than that noted in the HPI,  is negative    Physical Exam:   Vitals: /66   Pulse 60   Temp 98  F (36.7  C)   Resp 18   Ht 1.829 m (6')   Wt 107.9 kg (237 lb 12.8 oz)   SpO2 94%   BMI 32.25 kg/m     BMI= Body mass index is 32.25 kg/m .  GENERAL APPEARANCE:  Alert, in no distress  ENT:  Mouth and posterior oropharynx normal, moist mucous membranes, normal hearing acuity  EYES:  EOM normal, conjunctiva and lids normal  NECK:  No adenopathy,masses or thyromegaly  RESP:  respiratory effort and palpation of chest normal, no respiratory distress, diminished breath sounds bilaterally, no crackles or wheezes  CV:   regular rate and rhythm, 3/6 murmur, trace edema RLE with +pedal pulses  ABDOMEN:  mild to moderate ascites, soft, non-tender, no distension, no masses. M ild scrotal edema.   M/S:   wheelchair.  Wearing LLE prosthesis.  STRATTON with good strength. Mild LUE edema with fading hematoma, no warmth or erythema.   SKIN:  no rashes or open areas  PSYCH:  oriented X 3, memory and insight  impaired, affect normal      SNF labs  Lab Results   Component Value Date    WBC 4.1 04/01/2019     Lab Results   Component Value Date    RBC 2.80 04/01/2019     Lab Results   Component Value Date    HGB 9.0 04/16/2019     Lab Results   Component Value Date    HCT 27.0 04/01/2019     Lab Results   Component Value Date    MCV 96 04/01/2019     Lab Results   Component Value Date    MCH 30.7 04/01/2019     Lab Results   Component Value Date    MCHC 31.9 04/01/2019     Lab Results   Component Value Date    RDW 14.9 04/01/2019     Lab Results   Component Value Date     04/01/2019     Last Comprehensive Metabolic Panel:  Sodium   Date Value Ref Range Status   04/16/2019 138 133 - 144 mmol/L Final     Potassium   Date Value  Ref Range Status   04/16/2019 4.4 3.4 - 5.3 mmol/L Final     Chloride   Date Value Ref Range Status   04/16/2019 104 94 - 109 mmol/L Final     Carbon Dioxide   Date Value Ref Range Status   04/16/2019 28 20 - 32 mmol/L Final     Anion Gap   Date Value Ref Range Status   04/16/2019 6 3 - 14 mmol/L Final     Glucose   Date Value Ref Range Status   04/16/2019 69 (L) 70 - 99 mg/dL Final     Urea Nitrogen   Date Value Ref Range Status   04/16/2019 41 (H) 7 - 30 mg/dL Final     Creatinine   Date Value Ref Range Status   04/16/2019 1.70 (H) 0.66 - 1.25 mg/dL Final     GFR Estimate   Date Value Ref Range Status   04/16/2019 38 (L) >60 mL/min/[1.73_m2] Final     Comment:     Non  GFR Calc  Starting 12/18/2018, serum creatinine based estimated GFR (eGFR) will be   calculated using the Chronic Kidney Disease Epidemiology Collaboration   (CKD-EPI) equation.       Calcium   Date Value Ref Range Status   04/16/2019 8.8 8.5 - 10.1 mg/dL Final         DISCHARGE PLAN:  Follow up labs: per PCP  Medical Follow Up:      Follow up with primary care provider in 1 week  MTM referral needed and placed by this provider: No    Discharge Services: Home Care:  Occupational Therapy, Physical Therapy, Registered Nurse and Home Health Aide with [facility]      TOTAL DISCHARGE TIME:   Greater than 30 minutes  Electronically signed by:  DIPAK Kennedy CNP                 Sincerely,        DIPAK Kennedy CNP

## 2019-04-20 DIAGNOSIS — I50.22 CHRONIC SYSTOLIC CONGESTIVE HEART FAILURE (H): Primary | ICD-10-CM

## 2019-04-22 RX ORDER — TORSEMIDE 20 MG/1
TABLET ORAL
Qty: 180 TABLET | Refills: 0 | Status: SHIPPED | OUTPATIENT
Start: 2019-04-22 | End: 2019-06-06

## 2019-04-22 RX ORDER — SPIRONOLACTONE 25 MG/1
TABLET ORAL
Qty: 45 TABLET | Refills: 0 | Status: SHIPPED | OUTPATIENT
Start: 2019-04-22 | End: 2019-06-06

## 2019-04-23 ENCOUNTER — DOCUMENTATION ONLY (OUTPATIENT)
Dept: CARE COORDINATION | Facility: CLINIC | Age: 74
End: 2019-04-23

## 2019-04-23 NOTE — PROGRESS NOTES
[facility] now requests orders and shares plan of care/discharge summaries for some patients through [employer].  Please REPLY TO THIS MESSAGE OR ROUTE BACK TO THE AUTHOR in order to give authorization for orders when needed.  This is considered a verbal order, you will still receive a faxed copy of orders for signature.  Thank you for your assistance in improving collaboration for our patients.    ORDER  SN 1x/week x1, 2x/week x2, 3PRN  OT and PT evals    Please discontinue ranitidine if ok, pt refusing to take due to denying reflux.    Patient had 2 strengths of carvedilol in home 6.25 and 3.125 and patient took 1 tab of each this AM.  Pt instructed and verbalized understanding of being same med and will discard the 6.25 or cut in half.     MD SUMMARY/PLAN OF CARE  SUMMARY TO MD  SITUATION: Patient referred to [facility] following inpatient stay at the hospital from 2/28-3/14, then had 3 ER visits, and was in TCU with discharge home on 4/20.  He was hospitalized following mechanical fall resulting in LLE pain and hematoma.  Today /60, R 16, HR 50, T 98.0 temporal, 0/10 pain, O2 94% on room air, weight 226 pounds, height 72 inches.  Lungs are clear throughout but diminshed in lower lobes.  He has nonpitting edema noted to RLE.  He has trauma wounds to right forearm x2, right knee, and calf of right leg, all have dried drainage covering the site, pt declined to remove clear tegaderm over right knee and right forearm so unable to assess wound dimensions due to dried drainage obscuring the sites.  He has 4 steristrips and dried drainage covering the site to right calf. He has noted brusing to RLE and RUE that appear to be healing.  He has a small scab to back. He has needed paracentesis for acites. He is up with assist of one and walker, gait is unsteady, MACH 10 is 8, he is a high falls risk.  Reports good appetite but does not eat healthy.  He is to drink 2 supplements per day and reports will drink  when he wants to.  He needs asstance to complete ADLs/IADLs, his spouse is supportive and will assist as needed.  He manages own medications and wife assists with picking up medications from pharmacy.  He was missing vitamin b12, folic acid, hydrocortisone cream, multivitamin, senna, Vitamin B1, and spironolactone.  Pt states he has them but just cannot locate them and some are needing refills and will have spouse . Patient had 2 strengths of carvedilol in home 6.25 and 3.125 and patient took 1 tab of each this AM.  Pt instructed and verbalized understanding of being same med and will discard the 6.25 or cut in half.  He resides in apartment with spouse is supportive.    BACKGROUND: PMI includes CHF, hx of fall, alcoholic liver cirrhosis, CAD, HTN, afib, history of PE and DVT with IVC filter, history of MSSA abscess of left  stump in 12/2018.  ANALYSIS: At high risk for rehospitalization due to medical complexity, high fall risk.  RECOMMENDATION: SN for assessment and education related disease managment, medications, safety.  PT for LE strengthening, gait/mobility training.  OT for ADL/IADL safety, DME needs.  Declines HA and SW.

## 2019-04-25 ENCOUNTER — OFFICE VISIT (OUTPATIENT)
Dept: FAMILY MEDICINE | Facility: CLINIC | Age: 74
End: 2019-04-25
Payer: MEDICARE

## 2019-04-25 ENCOUNTER — TELEPHONE (OUTPATIENT)
Dept: FAMILY MEDICINE | Facility: CLINIC | Age: 74
End: 2019-04-25

## 2019-04-25 VITALS
BODY MASS INDEX: 32.23 KG/M2 | OXYGEN SATURATION: 98 % | DIASTOLIC BLOOD PRESSURE: 50 MMHG | TEMPERATURE: 97 F | HEART RATE: 58 BPM | SYSTOLIC BLOOD PRESSURE: 92 MMHG | WEIGHT: 238 LBS | HEIGHT: 72 IN

## 2019-04-25 DIAGNOSIS — K72.10 END-STAGE LIVER DISEASE (H): Primary | ICD-10-CM

## 2019-04-25 DIAGNOSIS — K70.31 ALCOHOLIC CIRRHOSIS OF LIVER WITH ASCITES (H): Primary | ICD-10-CM

## 2019-04-25 DIAGNOSIS — R78.81 MSSA BACTEREMIA: ICD-10-CM

## 2019-04-25 DIAGNOSIS — B95.61 MSSA BACTEREMIA: ICD-10-CM

## 2019-04-25 PROCEDURE — 99214 OFFICE O/P EST MOD 30 MIN: CPT | Performed by: INTERNAL MEDICINE

## 2019-04-25 ASSESSMENT — MIFFLIN-ST. JEOR: SCORE: 1847.56

## 2019-04-25 ASSESSMENT — PATIENT HEALTH QUESTIONNAIRE - PHQ9: SUM OF ALL RESPONSES TO PHQ QUESTIONS 1-9: 1

## 2019-04-25 NOTE — Clinical Note
Can we please call interventional radiology and see if we can set this patient up for an every other week schedule for a therapeutic paracentesis for the diagnosis of cirrhotic liver disease with ascites.  The patient will need to hold his Xarelto for 2 days prior to each procedure.  If you pend the orders I will be happy to sign them.  Please contact the patient to set up a time for his first procedure.

## 2019-04-25 NOTE — PROGRESS NOTES
SUBJECTIVE:   Antonio Ramirez is a 74 year old male who presents to clinic today for the following   health issues:          Hospital Follow-up Visit:    Hospital/Nursing Home/IP Rehab Facility: [facility]    Recent Hospitalization/ED:  [facility] stay 2/18/2019 to 3/14/2019.  Date of SNF Admission: March / 14 / 2019  Date of SNF (anticipated) Discharge: April / 21 / 2019  Discharged to: previous independent home and with family Wife  Reason(s) for Admission: Visit Diagnoses   MSSA bacteremia   Alcoholic cirrhosis of liver with ascites (H)   Chronic systolic congestive heart failure (H)   Ischemic cardiomyopathy   Paroxysmal atrial fibrillation (H)   History of pulmonary embolism   CKD (chronic kidney disease) stage 3, GFR 30-59 ml/min (H)   Phantom limb pain (H)   Hematoma of left lower extremity, subsequent encounter   Coronary artery disease involving native coronary artery of native heart without angina pectoris h/o CABG   Essential hypertension   Anemia, unspecified type   Left upper extremity swelling   Cognitive impairment   Physical deconditioning              Problems taking medications regularly: He was out of Spironolactone, but picked up a new prescription       Medication changes since discharge: None       Problems adhering to non-medication therapy: Yes, he has returned to drinking alcohol and is evasive about how much alcohol he is drinking    Summary of hospitalization:  [facility] discharge summary reviewed  Diagnostic Tests/Treatments reviewed.  Follow up needed: We discussed a referral back to Dr. Stratton or some other hepatologist, the patient is reluctant to involve more providers in his care.  He demands that we set up a standing order for paracenteses to manage his ascites  Other Healthcare Providers Involved in Patient s Care:         None  Update since discharge: improved.     Post Discharge Medication Reconciliation: discharge medications  reconciled, continue medications without change.  Plan of care communicated with patient and family (wife)     Coding guidelines for this visit:  Type of Medical   Decision Making Face-to-Face Visit       within 7 Days of discharge Face-to-Face Visit        within 14 days of discharge   Moderate Complexity [id number]   High Complexity [id number]          This is a pleasant but unfortunate 74-year-old man who is slowly dying of decompensated alcoholic cirrhotic liver disease.  Despite this, he continues to use alcohol against the advice of his physicians.  He was evaluated in the emergency department for pain that resulted from a fall and ecchymoses and hematoma to his legs.  He later developed a fever and was noted to have bacteremia.  He had methicillin sensitive staph aureus bacteremia that was treated with a 3-week course of Ancef as per infectious diseases recommendations.  The source of his bacteremia was unclear.  A transesophageal echocardiogram did not show any heart valve involvement.  The patient denies any recurrent fevers or chills.  He admits that he has returned to drinking alcohol.  He freely admits that he is not willing to stop drinking alcohol.  He feels that he is ready for another therapeutic paracentesis as he describes a distended abdomen that makes it difficult for him to take a deep breath.  He denies pain in his abdomen.  Pain related to his fall and ecchymoses and hematoma has resolved completely.    Additional history: as documented    Reviewed  and updated as needed this visit by clinical staff         Reviewed and updated as needed this visit by Provider         Patient Active Problem List   Diagnosis     Alcohol abuse     Alcoholic cirrhosis of liver (H)     Chronic kidney disease, stage III (moderate) (H)     Physical deconditioning     Prostate cancer -- S/P Radiation Seed implants 2000 (no problems since)     Antiphospholipid antibody syndrome (H)     Diffuse large B-cell lymphoma  of extranodal site (H)     Chronic congestive heart failure, unspecified congestive heart failure type     Thrombocytopenia -- suspect related to alcohol use     Hyperkalemia     Subdural hematoma (H)     Benign essential hypertension     Malabsorption of iron     Calculi, ureter     Anemia     CAD, S/P CABG in 2007     Aortic stenosis     Nonrheumatic mitral valve regurgitation     Nonrheumatic tricuspid valve regurgitation     Pulmonary hypertension (H)     Paroxysmal atrial fibrillation (H)     Ischemic cardiomyopathy     Aortic root dilatation (H)     Venous thrombosis     S/P total hip arthroplasty     S/P Left BKA 2017     Acute cystitis without hematuria     Traumatic hematoma of buttock     Acute blood loss anemia     CRF (chronic renal failure), stage 3 (moderate) (H)     MSSA bacteremia     H/O fall, recent     Hematoma of left lower extremity, subsequent encounter     Supratherapeutic INR     H/O deep venous thrombosis     History of pulmonary embolism     Essential hypertension     Chronic systolic congestive heart failure (H)     CKD (chronic kidney disease) stage 3, GFR 30-59 ml/min (H)     Phantom limb pain (H)     Debility     Past Surgical History:   Procedure Laterality Date     AMPUTATE LEG BELOW KNEE Left 04/2014    related to gangrene, started as foot ulcer related to neuropathy     AMPUTATE LEG BELOW KNEE Left 12/4/2017    Procedure: AMPUTATE LEG BELOW KNEE;  Exploration of Left Leg Below Knee Amputation Stump with Ligation of Bleeders.;  Surgeon: Leandro Arreola MD;  Location:  OR     APPENDECTOMY       APPLY WOUND VAC Left 12/6/2017    Procedure: APPLY WOUND VAC;;  Surgeon: Saima Howard MD;  Location:  SD     ARTHROPLASTY HIP Right 11/27/2018    Procedure: RIGHT TOTAL HIP ARTHROPLASTY;  Surgeon: Saima Howard MD;  Location:  OR     ARTHROPLASTY KNEE Right 9/19/2014    Procedure: ARTHROPLASTY KNEE;  Surgeon: Saima Howard MD;  Location:  OR     CARDIAC SURGERY       CABG     CHOLECYSTECTOMY       COLONOSCOPY       COMBINED CYSTOSCOPY, RETROGRADES, EXCHANGE STENT URETER(S) Left 7/24/2018    Procedure: COMBINED CYSTOSCOPY, RETROGRADES, EXCHANGE STENT URETER(S);  CYSTOSCOPY, BILATERAL RETROGRADES, BILATERAL URETERAL STENT EXCHANGE ;  Surgeon: Matt Cervantes MD;  Location:  OR     CRANIOTOMY Right 4/7/2018    Procedure: CRANIOTOMY;  Right Craniotomy For Subdural Hematoma;  Surgeon: Jono Issa MD;  Location:  OR     CYSTOSCOPY, DILATE URETHRA, COMBINED N/A 10/12/2016    Procedure: COMBINED CYSTOSCOPY, DILATE URETHRA;  Surgeon: Dimitry Bello MD;  Location:  OR     CYSTOSCOPY, REMOVE STENT(S), COMBINED Right 7/24/2018    Procedure: COMBINED CYSTOSCOPY, REMOVE STENT(S);;  Surgeon: Jose Hunter MD;  Location:  OR     ENDOSCOPIC STRIPPING VEIN(S)       esophagogastroduodenoscopy       ESOPHAGOSCOPY, GASTROSCOPY, DUODENOSCOPY (EGD), COMBINED N/A 3/11/2019    Procedure: COMBINED ESOPHAGOSCOPY, GASTROSCOPY, DUODENOSCOPY (EGD), BIOPSY SINGLE OR MULTIPLE;  Surgeon: Katherin Boyd MD;  Location:  GI     EYE SURGERY      cataracts     GENITOURINARY SURGERY      Prostate Seen Implants     HERNIA REPAIR      Umbilical     INCISION AND DRAINAGE LOWER EXTREMITY, COMBINED Left 12/1/2017    Procedure: COMBINED INCISION AND DRAINAGE LOWER EXTREMITY;  INCISION AND DRAINAGE OF LEFT BELOW KNEE AMPUTATION ABSCESS, WOUND VAC PLACEMENT;  Surgeon: Saima Howard MD;  Location:  OR     IR IVC FILTER PLACEMENT       IRRIGATION AND DEBRIDEMENT LOWER EXTREMITY, COMBINED Left 12/6/2017    Procedure: COMBINED IRRIGATION AND DEBRIDEMENT LOWER EXTREMITY;  IRRIGATION AND DEBRIDEMENT OF LEFT BELOW KNEE AMPUTATION SITE WITH WOUND VAC REPLACEMENT;  Surgeon: Saima Howard MD;  Location:  SD     IRRIGATION AND DEBRIDEMENT LOWER EXTREMITY, COMBINED Left 12/8/2017    Procedure: COMBINED IRRIGATION AND DEBRIDEMENT LOWER EXTREMITY;  IRRIGATION AND  DEBRIDEMENT OF LEFT BELOW THE KNEE AMPUTATION AND SHORTENING OF THE TIBIA WITH WOUND CLOSURE;  Surgeon: Saima Howard MD;  Location:  OR     LASER HOLMIUM LITHOTRIPSY URETER(S), INSERT STENT, COMBINED Bilateral 2018    Procedure: COMBINED CYSTOSCOPY, URETEROSCOPY, LASER HOLMIUM LITHOTRIPSY URETER(S), INSERT STENT;  CYSTOSCOPY, BILATERAL URETEROSCOPY,  HOLMIUM LASER LITHOTRIPSY, BILATERAL STENT PLACEMENT, STONE BASKETING;  Surgeon: Jose Hunter MD;  Location:  OR     LASER HOLMIUM LITHOTRIPSY URETER(S), INSERT STENT, COMBINED Left 2018    Procedure: COMBINED CYSTOSCOPY, URETEROSCOPY, LASER HOLMIUM LITHOTRIPSY URETER(S), INSERT STENT;  CYSTOSCOPY, LEFT URETEROSCOPY, LEFT LASER HOLMIUM LITHOTRIPSY URETER(S) REMOVAL BILATERAL STENTS;  Surgeon: Jose Hunter MD;  Location:  OR     ORTHOPEDIC SURGERY      (R) knee scope     ORTHOPEDIC SURGERY      total hip      ORTHOPEDIC SURGERY      elbow surgery       Social History     Tobacco Use     Smoking status: Never Smoker     Smokeless tobacco: Never Used   Substance Use Topics     Alcohol use: Yes     Comment: quit 10/2015; now 3-4 Vodkas/night, 2019 abt 1-2 vodka's per night     Family History   Problem Relation Age of Onset     Lung Cancer Mother      Heart Failure Father          age 87         Current Outpatient Medications   Medication Sig Dispense Refill     carvedilol (COREG) 3.125 MG tablet TAKE 1 TABLET(3.125 MG) BY MOUTH TWICE DAILY WITH MEALS 180 tablet 0     cyanocobalamin (VITAMIN B-12) 500 MCG SUBL sublingual tablet Place 2 tablets (1,000 mcg) under the tongue daily       fluticasone (FLONASE) 50 MCG/ACT nasal spray Spray 1 spray into both nostrils daily as needed       folic acid (FOLVITE) 1 MG tablet Take 1 tablet (1 mg) by mouth daily       gabapentin (NEURONTIN) 600 MG tablet Take 1 tablet (600 mg) by mouth 2 times daily 60 tablet 11     HYDROcodone-acetaminophen (NORCO) 5-325 MG tablet Take 1 tablet by mouth every 6  "hours as needed for pain (for left arm pain/LUE)       hydrocortisone (CORTAID) 1 % external cream Apply topically 2 times daily as needed       ipratropium (ATROVENT) 0.06 % nasal spray Spray 2 sprays into both nostrils 4 times daily       melatonin 3 MG tablet Take 1 tablet (3 mg) by mouth nightly as needed for sleep       multivitamin, therapeutic (THERA-VIT) TABS tablet Take 1 tablet by mouth daily       ranitidine (ZANTAC) 150 MG tablet Take 1 tablet (150 mg) by mouth daily       rivaroxaban ANTICOAGULANT (XARELTO) 20 MG TABS tablet Take 20 mg by mouth daily (with dinner)       senna-docusate (SENOKOT-S/PERICOLACE) 8.6-50 MG tablet Take 1 tablet by mouth daily as needed Hold for loose stools.        torsemide (DEMADEX) 20 MG tablet TAKE 1 TABLET BY MOUTH TWICE DAILY 180 tablet 0     vitamin B1 (THIAMINE) 100 MG tablet Take 1 tablet (100 mg) by mouth daily       spironolactone (ALDACTONE) 25 MG tablet TAKE 1/2 TABLET BY MOUTH DAILY (Patient not taking: Reported on 4/25/2019) 45 tablet 0     Allergies   Allergen Reactions     Ciprofloxacin Itching     Severe itching \"like ants were crawling\"     Hmg-Coa-R Inhibitors Other (See Comments)     Rhabdomyolysis occurred within a couple days       ROS:  Constitutional, HEENT, cardiovascular, pulmonary, gi and gu systems are negative, except as otherwise noted.    OBJECTIVE:     BP 92/50 (BP Location: Right arm, Patient Position: Chair, Cuff Size: Adult Regular)   Pulse 58   Temp 97  F (36.1  C) (Tympanic)   Ht 1.829 m (6')   Wt 108 kg (238 lb)   SpO2 98%   BMI 32.28 kg/m    Body mass index is 32.28 kg/m .  General: This is a chronically ill-appearing man in no acute distress who does not appear toxic.  Cardiovascular: Heart has a regular rate and rhythm.  Pulmonary: The lungs are clear to auscultation bilaterally, although one can tell that it is difficult for him to extend his lungs completely because of tense ascites.  Abdomen: Distended, soft, nontender, a " large amount of ascites is noted.  Extremities: The left extremity is amputated, the right extremity shows significant amount of edema extending to just below the knee worse than previous exams and he has not wearing his compression hosiery.  Mental status: Reasonable insight, severely impaired judgment, he understands that by continuing drinking alcohol he has shortening his life, he is choosing to continue drinking alcohol despite this, he is well-groomed, his speech is normal, he is alert and oriented to person place and time, he has a normal mood and affect.  Neurological: Cranial nerves II 12 are grossly intact, he is in a wheelchair today, but tells me that he uses a walker to get around his home.      ASSESSMENT/PLAN:         ICD-10-CM    1. End-stage liver disease (H) K72.90    2. MSSA bacteremia R78.81      This is a 74-year-old man with cirrhosis from alcohol and ascites who continues to drink alcohol.  He needs a paracentesis for therapeutic purposes, and will likely need repeated paracentesis to manage his ascites.  He will restart his Spironolactone as previously directed.  We will try to set up a every other week schedule for therapeutic paracentesis.  He was again counseled to stop drinking alcohol and offered resources to help him do so.  He declined those offers and understands the risks.  The source of his MSSA bacteremia is unclear.  However, he completed a course of antibiotics and his repeat blood cultures were sterile.  No heart valve involvement could be identified.  His liver disease likely makes him immunocompromised and this was discussed with him.  I would like to see him back in 4 to 6 weeks to recheck his electrolytes and renal function and recheck his clinical status with repeated paracenteses.  I did introduce the subject of hospice care given the grim prognosis associated with his advanced liver disease.  He declined a referral for hospice care.    Total face to face contact time was  greater than 28 minutes of which more than 50% of this time was spent counseling and coordinating care regarding the above topics.      Main Hardy MD  [facility]

## 2019-04-25 NOTE — Clinical Note
Please abstract the following data from this visit with this patient into the appropriate field in Epic:Colonoscopy done on this date: abt 2016 (approximately), by this group: somewhere in [place], results were ______.  Pt said he was told would not need another one.

## 2019-04-25 NOTE — TELEPHONE ENCOUNTER
Order for biweekly ultrasound-guided paracentesis signed.  No need to send fluid for diagnostic studies.  Thank you.

## 2019-04-25 NOTE — NURSING NOTE
Chief Complaint   Patient presents with     Hospital F/U     TCU discharge from [facility] on [facility]     Colonoscopy --Pt said about 3 yrs ago somewhere in [place].  Was told should not need another one.  Will look for report at home.  Abstracted / kd

## 2019-04-25 NOTE — TELEPHONE ENCOUNTER
Called [facility] to confirm orders have been signed for standing order paracentesis (once every other week).     He can call [phone] to schedule his procedure.     Will remind to hold Xarelto for 2 days prior to EACH procedure.     Birdie PETTIT RN

## 2019-04-25 NOTE — TELEPHONE ENCOUNTER
Chart Message from PCP:   Can we please call interventional radiology and see if we can set this patient up for an every other week schedule for a therapeutic paracentesis for the diagnosis of cirrhotic liver disease with ascites.  The patient will need to hold his Xarelto for 2 days prior to each procedure.  If you pend the orders I will be happy to sign them.  Please contact the patient to set up a time for his first procedure.     To PCP:   Order pended for US Paracentesis (Therapuetic)- just to confirm- no fluid sample processing?    If no, please review closely. I have pended order for 1 year (30 procedures (biweekly)), this order will  next year.

## 2019-04-29 NOTE — TELEPHONE ENCOUNTER
PT called again to clarify, gave number for radiology and pt repeated back Xarelto instructions.  Nevaeh Saldaña RN

## 2019-05-06 ENCOUNTER — HOSPITAL ENCOUNTER (OUTPATIENT)
Dept: ULTRASOUND IMAGING | Facility: CLINIC | Age: 74
End: 2019-05-06
Attending: INTERNAL MEDICINE | Admitting: INTERNAL MEDICINE
Payer: MEDICARE

## 2019-05-06 ENCOUNTER — HOSPITAL ENCOUNTER (OUTPATIENT)
Facility: CLINIC | Age: 74
Discharge: HOME | End: 2019-05-06
Attending: INTERNAL MEDICINE | Admitting: INTERNAL MEDICINE
Payer: MEDICARE

## 2019-05-06 VITALS
OXYGEN SATURATION: 99 % | HEART RATE: 66 BPM | RESPIRATION RATE: 16 BRPM | DIASTOLIC BLOOD PRESSURE: 67 MMHG | SYSTOLIC BLOOD PRESSURE: 124 MMHG | TEMPERATURE: 97.5 F

## 2019-05-06 DIAGNOSIS — K70.31 ALCOHOLIC CIRRHOSIS OF LIVER WITH ASCITES (H): ICD-10-CM

## 2019-05-06 LAB
INR PPP: 1.81 (ref 0.86–1.14)
PLATELET # BLD AUTO: 127 10E9/L (ref 150–450)

## 2019-05-06 PROCEDURE — 25000125 ZZHC RX 250: Performed by: INTERNAL MEDICINE

## 2019-05-06 PROCEDURE — 36415 COLL VENOUS BLD VENIPUNCTURE: CPT | Performed by: INTERNAL MEDICINE

## 2019-05-06 PROCEDURE — 40000863 ZZH STATISTIC RADIOLOGY XRAY, US, CT, MAR, NM

## 2019-05-06 PROCEDURE — 85049 AUTOMATED PLATELET COUNT: CPT | Performed by: INTERNAL MEDICINE

## 2019-05-06 PROCEDURE — 27210190 US PARACENTESIS

## 2019-05-06 PROCEDURE — 85610 PROTHROMBIN TIME: CPT | Performed by: INTERNAL MEDICINE

## 2019-05-06 RX ORDER — DEXTROSE MONOHYDRATE 25 G/50ML
25-50 INJECTION, SOLUTION INTRAVENOUS
Status: DISCONTINUED | OUTPATIENT
Start: 2019-05-06 | End: 2019-05-06 | Stop reason: HOSPADM

## 2019-05-06 RX ORDER — NICOTINE POLACRILEX 4 MG
15-30 LOZENGE BUCCAL
Status: DISCONTINUED | OUTPATIENT
Start: 2019-05-06 | End: 2019-05-06 | Stop reason: HOSPADM

## 2019-05-06 RX ORDER — LIDOCAINE HYDROCHLORIDE 10 MG/ML
10 INJECTION, SOLUTION EPIDURAL; INFILTRATION; INTRACAUDAL; PERINEURAL ONCE
Status: COMPLETED | OUTPATIENT
Start: 2019-05-06 | End: 2019-05-06

## 2019-05-06 RX ORDER — LIDOCAINE 40 MG/G
CREAM TOPICAL
Status: DISCONTINUED | OUTPATIENT
Start: 2019-05-06 | End: 2019-05-06 | Stop reason: HOSPADM

## 2019-05-06 RX ADMIN — LIDOCAINE HYDROCHLORIDE 10 ML: 10 INJECTION, SOLUTION EPIDURAL; INFILTRATION; INTRACAUDAL; PERINEURAL at 13:17

## 2019-05-06 NOTE — PROGRESS NOTES
Denies any c/o post paracentesis. Site left abdomen soft, with bandaid clean and dry. AVs was reviewed and given to pt. Discharged to home per w/c with wife driving, in stable condition.

## 2019-05-06 NOTE — PROGRESS NOTES
US guided paracentesis. Consent obtained, time out taken. Left abdomen drained of 3000 ml strawberry colored fluid. Pt tolerated well. Returned to care suites, taking PO awaiting wife.

## 2019-05-06 NOTE — DISCHARGE INSTRUCTIONS

## 2019-05-06 NOTE — PROGRESS NOTES
1210 Report received from Elis Torres RN.  1230  D/C instructions reviewed with pt with verbal understanding received. Copy given to pt.    1258 Pt to US at this time. Report given to Annalisa Bedoya RN.

## 2019-05-19 ENCOUNTER — DOCUMENTATION ONLY (OUTPATIENT)
Dept: CARE COORDINATION | Facility: CLINIC | Age: 74
End: 2019-05-19

## 2019-05-19 NOTE — PATIENT INSTRUCTIONS
[facility] now requests orders and shares plan of care/discharge summaries for some patients through [email or web address].  Please REPLY TO THIS MESSAGE OR ROUTE BACK TO THE AUTHOR in order to give authorization for orders when needed.  This is considered a verbal order, you will still receive a faxed copy of orders for signature.  Thank you for your assistance in improving collaboration for our patients.    ORDER    Requesting ongoing skilled nursing orders; 1 week 2, 3 prn      Thank you,     Latasha Flower RN  [phone]

## 2019-05-31 ENCOUNTER — HOSPITAL ENCOUNTER (OUTPATIENT)
Dept: ULTRASOUND IMAGING | Facility: CLINIC | Age: 74
End: 2019-05-31
Attending: INTERNAL MEDICINE | Admitting: INTERNAL MEDICINE
Payer: MEDICARE

## 2019-05-31 ENCOUNTER — HOSPITAL ENCOUNTER (OUTPATIENT)
Facility: CLINIC | Age: 74
Discharge: HOME | End: 2019-05-31
Attending: INTERNAL MEDICINE | Admitting: INTERNAL MEDICINE
Payer: MEDICARE

## 2019-05-31 VITALS
HEART RATE: 48 BPM | BODY MASS INDEX: 28.23 KG/M2 | TEMPERATURE: 97.6 F | SYSTOLIC BLOOD PRESSURE: 97 MMHG | HEIGHT: 74 IN | OXYGEN SATURATION: 98 % | RESPIRATION RATE: 16 BRPM | WEIGHT: 220 LBS | DIASTOLIC BLOOD PRESSURE: 55 MMHG

## 2019-05-31 DIAGNOSIS — K70.31 ALCOHOLIC CIRRHOSIS OF LIVER WITH ASCITES (H): ICD-10-CM

## 2019-05-31 PROCEDURE — 40000863 ZZH STATISTIC RADIOLOGY XRAY, US, CT, MAR, NM

## 2019-05-31 PROCEDURE — 25000125 ZZHC RX 250: Performed by: RADIOLOGY

## 2019-05-31 PROCEDURE — 27210190 US PARACENTESIS

## 2019-05-31 RX ORDER — LIDOCAINE 40 MG/G
CREAM TOPICAL
Status: DISCONTINUED | OUTPATIENT
Start: 2019-05-31 | End: 2019-05-31 | Stop reason: HOSPADM

## 2019-05-31 RX ORDER — NICOTINE POLACRILEX 4 MG
15-30 LOZENGE BUCCAL
Status: DISCONTINUED | OUTPATIENT
Start: 2019-05-31 | End: 2019-05-31 | Stop reason: HOSPADM

## 2019-05-31 RX ORDER — NICOTINE POLACRILEX 4 MG
15-30 LOZENGE BUCCAL
Status: DISCONTINUED | OUTPATIENT
Start: 2019-05-31 | End: 2019-05-31

## 2019-05-31 RX ORDER — DEXTROSE MONOHYDRATE 25 G/50ML
25-50 INJECTION, SOLUTION INTRAVENOUS
Status: DISCONTINUED | OUTPATIENT
Start: 2019-05-31 | End: 2019-05-31

## 2019-05-31 RX ORDER — LIDOCAINE HYDROCHLORIDE 10 MG/ML
10 INJECTION, SOLUTION EPIDURAL; INFILTRATION; INTRACAUDAL; PERINEURAL ONCE
Status: COMPLETED | OUTPATIENT
Start: 2019-05-31 | End: 2019-05-31

## 2019-05-31 RX ORDER — ALBUMIN (HUMAN) 12.5 G/50ML
12.5 SOLUTION INTRAVENOUS ONCE
Status: DISCONTINUED | OUTPATIENT
Start: 2019-05-31 | End: 2019-05-31 | Stop reason: HOSPADM

## 2019-05-31 RX ORDER — DEXTROSE MONOHYDRATE 25 G/50ML
25-50 INJECTION, SOLUTION INTRAVENOUS
Status: DISCONTINUED | OUTPATIENT
Start: 2019-05-31 | End: 2019-05-31 | Stop reason: HOSPADM

## 2019-05-31 RX ADMIN — LIDOCAINE HYDROCHLORIDE 10 ML: 10 INJECTION, SOLUTION EPIDURAL; INFILTRATION; INTRACAUDAL; PERINEURAL at 09:26

## 2019-05-31 ASSESSMENT — MIFFLIN-ST. JEOR: SCORE: 1797.66

## 2019-05-31 NOTE — DISCHARGE INSTRUCTIONS

## 2019-05-31 NOTE — PROGRESS NOTES
Care Suites Admission Nursing Note    Reason for admission: paracentesis  CS arrival time: 0825  Accompanied by: self  Name/phone of DC : wife will return  Medications held: Xarelto held since Tuesday  Consent signed: await radiologist  Abnormal assessment/labs: Labs current for procedure  If abnormal, provider notified: N/A  Education/questions answered: Discharge instructions reviewed  Plan: Proceed with paracentesis, stabilize and discharge.

## 2019-05-31 NOTE — PROGRESS NOTES
Pt back to the care suites ~1030.  VSS.  No c/o.  Left abdomen site CDI.  Pt states his wife will be ready to pick him up at ~1100.  Will observe pt until 11 and then pt will be ready for discharge.

## 2019-05-31 NOTE — PROGRESS NOTES
Care Suites Discharge Nursing Note    Education/questions answered: Yes  Patient DC location: home  Accompanied by: spouse  CS discharge time: 1100

## 2019-05-31 NOTE — PROGRESS NOTES
Care Suites Procedure Nursing Note    Procedure: paracentesis  Procedure started time: 0932  Procedure completed time: 1001  Concerns/abnormal assessment: 5400 ml of cloudy kristen colored fluid removed from left abdominal puncture site. Prolonged bleeding following removal of drainage catheter. Pressure held.Dressing applied.  Plan: Return to Care Suites for stabilization and discharge

## 2019-06-06 ENCOUNTER — OFFICE VISIT (OUTPATIENT)
Dept: FAMILY MEDICINE | Facility: CLINIC | Age: 74
End: 2019-06-06
Payer: MEDICARE

## 2019-06-06 VITALS
BODY MASS INDEX: 28.25 KG/M2 | OXYGEN SATURATION: 96 % | HEIGHT: 74 IN | SYSTOLIC BLOOD PRESSURE: 103 MMHG | DIASTOLIC BLOOD PRESSURE: 62 MMHG | TEMPERATURE: 97.8 F | HEART RATE: 45 BPM

## 2019-06-06 DIAGNOSIS — K70.31 ALCOHOLIC CIRRHOSIS OF LIVER WITH ASCITES (H): Primary | ICD-10-CM

## 2019-06-06 DIAGNOSIS — I50.22 CHRONIC SYSTOLIC CONGESTIVE HEART FAILURE (H): ICD-10-CM

## 2019-06-06 DIAGNOSIS — Z96.641 STATUS POST TOTAL REPLACEMENT OF RIGHT HIP: ICD-10-CM

## 2019-06-06 PROCEDURE — 99213 OFFICE O/P EST LOW 20 MIN: CPT | Performed by: INTERNAL MEDICINE

## 2019-06-06 RX ORDER — TORSEMIDE 20 MG/1
20 TABLET ORAL 2 TIMES DAILY
Qty: 180 TABLET | Refills: 0 | Status: ON HOLD | OUTPATIENT
Start: 2019-06-06 | End: 2019-06-19

## 2019-06-06 RX ORDER — SPIRONOLACTONE 25 MG/1
TABLET ORAL
Qty: 45 TABLET | Refills: 0 | Status: ON HOLD | OUTPATIENT
Start: 2019-06-06 | End: 2019-06-19

## 2019-06-06 RX ORDER — AMOXICILLIN 250 MG
1 CAPSULE ORAL DAILY PRN
Qty: 1180 TABLET | Refills: 3 | Status: SHIPPED | OUTPATIENT
Start: 2019-06-06 | End: 2019-08-12

## 2019-06-06 RX ORDER — GABAPENTIN 600 MG/1
600 TABLET ORAL 2 TIMES DAILY
Qty: 60 TABLET | Refills: 11 | Status: ON HOLD | OUTPATIENT
Start: 2019-06-06 | End: 2019-06-19

## 2019-06-06 NOTE — PROGRESS NOTES
Subjective     Antonio Ramirez is a 74 year old male who presents to clinic today for the following health issues:    HPI           74-year-old man here to follow-up ascites, he just had a large-volume paracentesis at [facility].  He has a standing order for therapeutic paracenteses.  He feels as if the frequency at which he requires the procedure is increasing.  He denies abdominal pain or fevers.  He is taking his diuretics as directed.  He continues to have trouble with lymphedema in the right leg but is working with the lymphedema therapist.    Patient Active Problem List   Diagnosis     Alcohol abuse     Alcoholic cirrhosis of liver (H)     Chronic kidney disease, stage III (moderate) (H)     Physical deconditioning     Prostate cancer -- S/P Radiation [employer] implants 2000 (no problems since)     Antiphospholipid antibody syndrome (H)     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 (H)     Chronic congestive heart failure, unspecified congestive heart failure type     Thrombocytopenia -- suspect related to alcohol use     Hyperkalemia     Subdural hematoma (H)     Benign essential hypertension     Malabsorption of iron     Calculi, ureter     Anemia     CAD, S/P CABG in 2007     Aortic stenosis     Nonrheumatic mitral valve regurgitation     Nonrheumatic tricuspid valve regurgitation     Pulmonary hypertension (H)     Paroxysmal atrial fibrillation (H)     Ischemic cardiomyopathy     Aortic root dilatation (H)     Venous thrombosis     S/P total hip arthroplasty     S/P Left BKA 2017     Acute cystitis without hematuria     Traumatic hematoma of buttock     Acute blood loss anemia     CRF (chronic renal failure), stage 3 (moderate) (H)     MSSA bacteremia     H/O fall, recent     Hematoma of left lower extremity, subsequent encounter     Supratherapeutic INR     H/O deep venous thrombosis     History of pulmonary embolism     Essential hypertension     Chronic systolic congestive heart failure (H)      CKD (chronic kidney disease) stage 3, GFR 30-59 ml/min (H)     Phantom limb pain (H)     Debility     Past Surgical History:   Procedure Laterality Date     AMPUTATE LEG BELOW KNEE Left 04/2014    related to gangrene, started as foot ulcer related to neuropathy     AMPUTATE LEG BELOW KNEE Left 12/4/2017    Procedure: AMPUTATE LEG BELOW KNEE;  Exploration of Left Leg Below Knee Amputation Stump with Ligation of Bleeders.;  Surgeon: Leandro Arreola MD;  Location:  OR     APPENDECTOMY       APPLY WOUND VAC Left 12/6/2017    Procedure: APPLY WOUND VAC;;  Surgeon: Saima Howard MD;  Location:  SD     ARTHROPLASTY HIP Right 11/27/2018    Procedure: RIGHT TOTAL HIP ARTHROPLASTY;  Surgeon: Saima Howard MD;  Location:  OR     ARTHROPLASTY KNEE Right 9/19/2014    Procedure: ARTHROPLASTY KNEE;  Surgeon: Saima Howard MD;  Location:  OR     CARDIAC SURGERY      CABG     CHOLECYSTECTOMY       COLONOSCOPY       COMBINED CYSTOSCOPY, RETROGRADES, EXCHANGE STENT URETER(S) Left 7/24/2018    Procedure: COMBINED CYSTOSCOPY, RETROGRADES, EXCHANGE STENT URETER(S);  CYSTOSCOPY, BILATERAL RETROGRADES, BILATERAL URETERAL STENT EXCHANGE ;  Surgeon: Matt Cervantes MD;  Location:  OR     CRANIOTOMY Right 4/7/2018    Procedure: CRANIOTOMY;  Right Craniotomy For Subdural Hematoma;  Surgeon: Jono Issa MD;  Location:  OR     CYSTOSCOPY, DILATE URETHRA, COMBINED N/A 10/12/2016    Procedure: COMBINED CYSTOSCOPY, DILATE URETHRA;  Surgeon: Dimitry Bello MD;  Location:  OR     CYSTOSCOPY, REMOVE STENT(S), COMBINED Right 7/24/2018    Procedure: COMBINED CYSTOSCOPY, REMOVE STENT(S);;  Surgeon: Jose Hunter MD;  Location:  OR     ENDOSCOPIC STRIPPING VEIN(S)       esophagogastroduodenoscopy       ESOPHAGOSCOPY, GASTROSCOPY, DUODENOSCOPY (EGD), COMBINED N/A 3/11/2019    Procedure: COMBINED ESOPHAGOSCOPY, GASTROSCOPY, DUODENOSCOPY (EGD), BIOPSY SINGLE OR MULTIPLE;  Surgeon:  Katherin Boyd MD;  Location:  GI     EYE SURGERY      cataracts     GENITOURINARY SURGERY      Prostate Seen Implants     HERNIA REPAIR      Umbilical     INCISION AND DRAINAGE LOWER EXTREMITY, COMBINED Left 12/1/2017    Procedure: COMBINED INCISION AND DRAINAGE LOWER EXTREMITY;  INCISION AND DRAINAGE OF LEFT BELOW KNEE AMPUTATION ABSCESS, WOUND VAC PLACEMENT;  Surgeon: Saima Howard MD;  Location:  OR     IR IVC FILTER PLACEMENT       IRRIGATION AND DEBRIDEMENT LOWER EXTREMITY, COMBINED Left 12/6/2017    Procedure: COMBINED IRRIGATION AND DEBRIDEMENT LOWER EXTREMITY;  IRRIGATION AND DEBRIDEMENT OF LEFT BELOW KNEE AMPUTATION SITE WITH WOUND VAC REPLACEMENT;  Surgeon: Siama Howard MD;  Location:  SD     IRRIGATION AND DEBRIDEMENT LOWER EXTREMITY, COMBINED Left 12/8/2017    Procedure: COMBINED IRRIGATION AND DEBRIDEMENT LOWER EXTREMITY;  IRRIGATION AND DEBRIDEMENT OF LEFT BELOW THE KNEE AMPUTATION AND SHORTENING OF THE TIBIA WITH WOUND CLOSURE;  Surgeon: Saima Howard MD;  Location:  OR     LASER HOLMIUM LITHOTRIPSY URETER(S), INSERT STENT, COMBINED Bilateral 6/28/2018    Procedure: COMBINED CYSTOSCOPY, URETEROSCOPY, LASER HOLMIUM LITHOTRIPSY URETER(S), INSERT STENT;  CYSTOSCOPY, BILATERAL URETEROSCOPY,  HOLMIUM LASER LITHOTRIPSY, BILATERAL STENT PLACEMENT, STONE BASKETING;  Surgeon: Jose Hunter MD;  Location:  OR     LASER HOLMIUM LITHOTRIPSY URETER(S), INSERT STENT, COMBINED Left 8/14/2018    Procedure: COMBINED CYSTOSCOPY, URETEROSCOPY, LASER HOLMIUM LITHOTRIPSY URETER(S), INSERT STENT;  CYSTOSCOPY, LEFT URETEROSCOPY, LEFT LASER HOLMIUM LITHOTRIPSY URETER(S) REMOVAL BILATERAL STENTS;  Surgeon: Jose Hunter MD;  Location:  OR     ORTHOPEDIC SURGERY      (R) knee scope     ORTHOPEDIC SURGERY      total hip      ORTHOPEDIC SURGERY      elbow surgery       Social History     Tobacco Use     Smoking status: Never Smoker     Smokeless tobacco: Never Used   Substance  "Use Topics     Alcohol use: Yes     Comment: quit 10/2015; now 3-4 Vodkas/night, 2019 abt 1-2 vodka's per night     Family History   Problem Relation Age of Onset     Lung Cancer Mother      Heart Failure Father          age 87         Current Outpatient Medications   Medication Sig Dispense Refill     carvedilol (COREG) 3.125 MG tablet TAKE 1 TABLET BY MOUTH TWICE DAILY WITH MEALS 180 tablet 3     gabapentin (NEURONTIN) 600 MG tablet Take 1 tablet (600 mg) by mouth 2 times daily 60 tablet 11     HYDROcodone-acetaminophen (NORCO) 5-325 MG tablet Take 1 tablet by mouth every 6 hours as needed for pain (for left arm pain/LUE)       ipratropium (ATROVENT) 0.06 % nasal spray Spray 2 sprays into both nostrils 4 times daily       melatonin 3 MG tablet Take 1 tablet (3 mg) by mouth nightly as needed for sleep       rivaroxaban ANTICOAGULANT (XARELTO) 20 MG TABS tablet Take 20 mg by mouth daily (with dinner)       senna-docusate (SENOKOT-S/PERICOLACE) 8.6-50 MG tablet Take 1 tablet by mouth daily as needed Hold for loose stools. 1180 tablet 3     spironolactone (ALDACTONE) 25 MG tablet TAKE 1/2 TABLET BY MOUTH DAILY 45 tablet 0     torsemide (DEMADEX) 20 MG tablet TAKE 1 TABLET BY MOUTH TWICE DAILY 180 tablet 0     vitamin B1 (THIAMINE) 100 MG tablet Take 1 tablet (100 mg) by mouth daily       cyanocobalamin (VITAMIN B-12) 500 MCG SUBL sublingual tablet Place 2 tablets (1,000 mcg) under the tongue daily (Patient not taking: Reported on 2019)       folic acid (FOLVITE) 1 MG tablet Take 1 tablet (1 mg) by mouth daily (Patient not taking: Reported on 2019)       hydrocortisone (CORTAID) 1 % external cream Apply topically 2 times daily as needed       multivitamin, therapeutic (THERA-VIT) TABS tablet Take 1 tablet by mouth daily (Patient not taking: Reported on 2019)       Allergies   Allergen Reactions     Ciprofloxacin Itching     Severe itching \"like ants were crawling\"     Hmg-Coa-R Inhibitors Other (See " "Comments)     Rhabdomyolysis occurred within a couple days       Reviewed and updated as needed this visit by Provider         Review of Systems   ROS COMP: Constitutional, HEENT, cardiovascular, pulmonary, gi and gu systems are negative, except as otherwise noted.      Objective    /62 (BP Location: Right arm, Patient Position: Sitting, Cuff Size: Adult Regular)   Pulse (!) 45   Temp 97.8  F (36.6  C) (Oral)   Ht 1.88 m (6' 2\")   SpO2 96%   BMI 28.25 kg/m    Body mass index is 28.25 kg/m .  Physical Exam   General: This is a stable appearing man in no acute distress.  He does appear chronically ill.  HEENT: The mucous membranes are moist, neck supple without adenopathy.  Cardiovascular: The heart has a regular rate and rhythm.  Pulmonary: The lungs are clear to auscultation bilaterally, breathing is not labored.  Abdomen: Distended, nontender, bulging flanks are noted, no obvious organomegaly, bowel sounds are present.  Extremities: There is swelling in the right lower extremity with chronic venous stasis skin changes.  The left lower extremity is amputated.  Mental status: Appropriate mood and affect, insight and judgment remain mildly impaired as he continues to drink alcohol, he is well-groomed, speech is normal.  Neurological: Alert and oriented to person place time, he is in a wheelchair, but typically walks with a cane.            Assessment & Plan     1. Alcoholic cirrhosis of liver with ascites (H)  His ascites is becoming more symptomatic with requirements for more frequent therapeutic paracenteses.  He was again counseled to stop taking alcohol.  I think that he might benefit from consulting with a hematologist to see if there is anything that can be offered to help him with his symptomatic ascites other than his current diuretics and intermittent paracenteses and if there is any other management recommendations for his advancing cirrhosis.  He was agreeable to this.  I referred him to " [facility] at [facility].  - senna-docusate (SENOKOT-S/PERICOLACE) 8.6-50 MG tablet; Take 1 tablet by mouth daily as needed Hold for loose stools.  Dispense: 1180 tablet; Refill: 3  - GASTROENTEROLOGY ADULT REF CONSULT ONLY          I    Return in about 2 months (around 8/6/2019) for recheck .    Main Hardy MD  [facility]

## 2019-06-06 NOTE — PROGRESS NOTES
"Subjective     Antonio Ramirez is a 74 year old male who presents to clinic today for the following health issues:    HPI       Hospital Follow-up Visit:    Hospital/Nursing Home/IP Rehab Facility: [facility]  Date of Admission: 5/31/2019  Date of Discharge: ***  Reason(s) for Admission:     Alcoholic Cirrhosis of Liver with ascites                Problems taking medications regularly:  {NONE DEFAULTED:625364::\"None\"}       Medication changes since discharge: {NONE DEFAULTED:947527::\"None\"}       Problems adhering to non-medication therapy:  {NONE DEFAULTED:316248::\"None\"}  {roomer to stop here, delete this reminder}  Summary of hospitalization:  {HOSPITAL DISCHARGE SUMMARY INFO:529680::\"[facility] discharge summary reviewed\"}  Diagnostic Tests/Treatments reviewed.  Follow up needed: {NONE DEFAULTED:649930::\"none\"}  Other Healthcare Providers Involved in Patient s Care:         {those currently involved after discharge:147864::\"None\"}  Update since discharge: {IMPROVED DEFAULT:794901::\"improved.\"} {include information from family, SNF, care coordination}    Post Discharge Medication Reconciliation: {ACO Med Rec (Provider):719347}.  Plan of care communicated with {Communicate Plan to:824994::\"patient\"}     Coding guidelines for this visit:  Type of Medical   Decision Making Face-to-Face Visit       within 7 Days of discharge Face-to-Face Visit        within 14 days of discharge   Moderate Complexity 45277 19709   High Complexity 52849 29671            {additonal problems for provider to add (Optional):648176}    {HIST REVIEW/ LINKS 2 (Optional):826197}    Reviewed and updated as needed this visit by Provider         Review of Systems   {ROS COMP (Optional):911122}      Objective    There were no vitals taken for this visit.  There is no height or weight on file to calculate BMI.  Physical Exam   {Exam List (Optional):990508}    {Diagnostic Test Results (Optional):742096::\"Diagnostic Test " "Results:\",\"Labs reviewed in Epic\"}        {PROVIDER CHARTING PREFERENCE:092149}    "

## 2019-06-10 ENCOUNTER — HOSPITAL ENCOUNTER (OUTPATIENT)
Facility: CLINIC | Age: 74
Discharge: HOME | End: 2019-06-10
Admitting: PHYSICIAN ASSISTANT
Payer: MEDICARE

## 2019-06-10 ENCOUNTER — HOSPITAL ENCOUNTER (OUTPATIENT)
Dept: ULTRASOUND IMAGING | Facility: CLINIC | Age: 74
End: 2019-06-10
Attending: INTERNAL MEDICINE
Payer: MEDICARE

## 2019-06-10 VITALS
HEIGHT: 74 IN | HEART RATE: 54 BPM | BODY MASS INDEX: 28.23 KG/M2 | DIASTOLIC BLOOD PRESSURE: 55 MMHG | OXYGEN SATURATION: 94 % | WEIGHT: 220 LBS | RESPIRATION RATE: 16 BRPM | SYSTOLIC BLOOD PRESSURE: 100 MMHG | TEMPERATURE: 97.7 F

## 2019-06-10 DIAGNOSIS — K70.31 ALCOHOLIC CIRRHOSIS OF LIVER WITH ASCITES (H): ICD-10-CM

## 2019-06-10 LAB
INR PPP: 1.84 (ref 0.86–1.14)
PLATELET # BLD AUTO: 134 10E9/L (ref 150–450)

## 2019-06-10 PROCEDURE — 85610 PROTHROMBIN TIME: CPT | Performed by: RADIOLOGY

## 2019-06-10 PROCEDURE — 87205 SMEAR GRAM STAIN: CPT | Performed by: PHYSICIAN ASSISTANT

## 2019-06-10 PROCEDURE — 85049 AUTOMATED PLATELET COUNT: CPT | Performed by: RADIOLOGY

## 2019-06-10 PROCEDURE — 89051 BODY FLUID CELL COUNT: CPT | Performed by: PHYSICIAN ASSISTANT

## 2019-06-10 PROCEDURE — 87070 CULTURE OTHR SPECIMN AEROBIC: CPT | Performed by: PHYSICIAN ASSISTANT

## 2019-06-10 PROCEDURE — 25000125 ZZHC RX 250: Performed by: PHYSICIAN ASSISTANT

## 2019-06-10 PROCEDURE — 36415 COLL VENOUS BLD VENIPUNCTURE: CPT | Performed by: RADIOLOGY

## 2019-06-10 PROCEDURE — 27210190 US PARACENTESIS

## 2019-06-10 PROCEDURE — 40000863 ZZH STATISTIC RADIOLOGY XRAY, US, CT, MAR, NM

## 2019-06-10 PROCEDURE — 84157 ASSAY OF PROTEIN OTHER: CPT | Performed by: PHYSICIAN ASSISTANT

## 2019-06-10 RX ORDER — NICOTINE POLACRILEX 4 MG
15-30 LOZENGE BUCCAL
Status: DISCONTINUED | OUTPATIENT
Start: 2019-06-10 | End: 2019-06-10 | Stop reason: HOSPADM

## 2019-06-10 RX ORDER — LIDOCAINE HYDROCHLORIDE 10 MG/ML
10 INJECTION, SOLUTION EPIDURAL; INFILTRATION; INTRACAUDAL; PERINEURAL ONCE
Status: COMPLETED | OUTPATIENT
Start: 2019-06-10 | End: 2019-06-10

## 2019-06-10 RX ORDER — NICOTINE POLACRILEX 4 MG
15-30 LOZENGE BUCCAL
Status: CANCELLED | OUTPATIENT
Start: 2019-06-10

## 2019-06-10 RX ORDER — ALBUMIN (HUMAN) 12.5 G/50ML
12.5 SOLUTION INTRAVENOUS ONCE
Status: CANCELLED | OUTPATIENT
Start: 2019-06-10 | End: 2019-06-10

## 2019-06-10 RX ORDER — DEXTROSE MONOHYDRATE 25 G/50ML
25-50 INJECTION, SOLUTION INTRAVENOUS
Status: CANCELLED | OUTPATIENT
Start: 2019-06-10

## 2019-06-10 RX ORDER — LIDOCAINE 40 MG/G
CREAM TOPICAL
Status: DISCONTINUED | OUTPATIENT
Start: 2019-06-10 | End: 2019-06-10 | Stop reason: HOSPADM

## 2019-06-10 RX ORDER — DEXTROSE MONOHYDRATE 25 G/50ML
25-50 INJECTION, SOLUTION INTRAVENOUS
Status: DISCONTINUED | OUTPATIENT
Start: 2019-06-10 | End: 2019-06-10 | Stop reason: HOSPADM

## 2019-06-10 RX ADMIN — LIDOCAINE HYDROCHLORIDE 10 ML: 10 INJECTION, SOLUTION EPIDURAL; INFILTRATION; INTRACAUDAL; PERINEURAL at 08:27

## 2019-06-10 ASSESSMENT — MIFFLIN-ST. JEOR: SCORE: 1797.66

## 2019-06-10 NOTE — PROGRESS NOTES
Care Suites Admission Nursing Note    Reason for admission: paracentesis   CS arrival time: 0715  Accompanied by: self  Name/phone of DC : wife Helena   Medications held: xareltofor 48 hrs  Consent signed: will be signed in US   Abnormal assessment/labs: WDL  If abnormal, provider notified: NA  Education/questions answered: yes  Plan: paracentesis

## 2019-06-10 NOTE — PROGRESS NOTES
RADIOLOGY PROCEDURE NOTE  Patient name: Antonio Ramirez  MRN: [id number]  : 1945    Pre-procedure diagnosis: Ascites  Post-procedure diagnosis: Same    Procedure Date/Time: Betzy 10, 2019  8:35 AM  Procedure: Paracentesis  Estimated blood loss: None  Specimen(s) collected with description: yellow blood tinged fluid  The patient tolerated the procedure well with no immediate complications.  Significant findings:none    See imaging dictation for procedural details.    Provider name: Ran Giraldo  Assistant(s):None

## 2019-06-10 NOTE — PROGRESS NOTES
Care Suites Procedure Nursing Note    Procedure: paracentesis  Procedure started time: 825  Procedure completed time: 850  Concerns/abnormal assessment: Drained 5400 ml blood tinged fluid from right abd. Site D/I. Denies c/o. VSS.  Plan: Back to care suites.    0935 Pt discharged per w/c to door #1 with wife and instructions.

## 2019-06-10 NOTE — DISCHARGE INSTRUCTIONS

## 2019-06-11 ENCOUNTER — ANESTHESIA (OUTPATIENT)
Dept: ONCOLOGY | Facility: CLINIC | Age: 74
DRG: 871 | End: 2019-06-11
Payer: MEDICARE

## 2019-06-11 ENCOUNTER — APPOINTMENT (OUTPATIENT)
Dept: GENERAL RADIOLOGY | Facility: CLINIC | Age: 74
DRG: 871 | End: 2019-06-11
Attending: EMERGENCY MEDICINE
Payer: MEDICARE

## 2019-06-11 ENCOUNTER — APPOINTMENT (OUTPATIENT)
Dept: CARDIOLOGY | Facility: CLINIC | Age: 74
DRG: 871 | End: 2019-06-11
Attending: PHYSICIAN ASSISTANT
Payer: MEDICARE

## 2019-06-11 ENCOUNTER — APPOINTMENT (OUTPATIENT)
Dept: CT IMAGING | Facility: CLINIC | Age: 74
DRG: 871 | End: 2019-06-11
Attending: EMERGENCY MEDICINE
Payer: MEDICARE

## 2019-06-11 ENCOUNTER — APPOINTMENT (OUTPATIENT)
Dept: GENERAL RADIOLOGY | Facility: CLINIC | Age: 74
DRG: 871 | End: 2019-06-11
Attending: PHYSICIAN ASSISTANT
Payer: MEDICARE

## 2019-06-11 ENCOUNTER — HOSPITAL ENCOUNTER (INPATIENT)
Facility: CLINIC | Age: 74
LOS: 8 days | Discharge: NURSING, REHAB OR LONG-TERM CARE | DRG: 871 | End: 2019-06-19
Attending: EMERGENCY MEDICINE | Admitting: INTERNAL MEDICINE
Payer: MEDICARE

## 2019-06-11 ENCOUNTER — ANESTHESIA EVENT (OUTPATIENT)
Dept: ONCOLOGY | Facility: CLINIC | Age: 74
DRG: 871 | End: 2019-06-11
Payer: MEDICARE

## 2019-06-11 DIAGNOSIS — K70.31 ALCOHOLIC CIRRHOSIS OF LIVER WITH ASCITES (H): ICD-10-CM

## 2019-06-11 DIAGNOSIS — N18.30 CKD (CHRONIC KIDNEY DISEASE) STAGE 3, GFR 30-59 ML/MIN (H): ICD-10-CM

## 2019-06-11 DIAGNOSIS — R05.9 COUGH: ICD-10-CM

## 2019-06-11 DIAGNOSIS — M62.81 GENERALIZED MUSCLE WEAKNESS: ICD-10-CM

## 2019-06-11 DIAGNOSIS — N18.30 CHRONIC KIDNEY DISEASE, STAGE III (MODERATE) (H): ICD-10-CM

## 2019-06-11 DIAGNOSIS — N17.9 ACUTE KIDNEY INJURY (H): ICD-10-CM

## 2019-06-11 DIAGNOSIS — J18.9 PNEUMONIA OF RIGHT LOWER LOBE DUE TO INFECTIOUS ORGANISM: ICD-10-CM

## 2019-06-11 DIAGNOSIS — J69.0 ASPIRATION PNEUMONIA, UNSPECIFIED ASPIRATION PNEUMONIA TYPE, UNSPECIFIED LATERALITY, UNSPECIFIED PART OF LUNG (H): Primary | ICD-10-CM

## 2019-06-11 DIAGNOSIS — R11.2 NON-INTRACTABLE VOMITING WITH NAUSEA, UNSPECIFIED VOMITING TYPE: ICD-10-CM

## 2019-06-11 DIAGNOSIS — R50.9 ACUTE FEBRILE ILLNESS: ICD-10-CM

## 2019-06-11 LAB
ABO + RH BLD: NORMAL
ABO + RH BLD: NORMAL
ALBUMIN SERPL-MCNC: 2.4 G/DL (ref 3.4–5)
ALBUMIN SERPL-MCNC: 2.4 G/DL (ref 3.4–5)
ALBUMIN SERPL-MCNC: 2.5 G/DL (ref 3.4–5)
ALBUMIN SERPL-MCNC: 2.5 G/DL (ref 3.4–5)
ALBUMIN UR-MCNC: 10 MG/DL
ALP SERPL-CCNC: 82 U/L (ref 40–150)
ALP SERPL-CCNC: 83 U/L (ref 40–150)
ALP SERPL-CCNC: 88 U/L (ref 40–150)
ALP SERPL-CCNC: 93 U/L (ref 40–150)
ALT SERPL W P-5'-P-CCNC: 11 U/L (ref 0–70)
ALT SERPL W P-5'-P-CCNC: 11 U/L (ref 0–70)
ALT SERPL W P-5'-P-CCNC: 12 U/L (ref 0–70)
ALT SERPL W P-5'-P-CCNC: 12 U/L (ref 0–70)
ANION GAP SERPL CALCULATED.3IONS-SCNC: 4 MMOL/L (ref 3–14)
ANION GAP SERPL CALCULATED.3IONS-SCNC: 4 MMOL/L (ref 3–14)
ANION GAP SERPL CALCULATED.3IONS-SCNC: 7 MMOL/L (ref 3–14)
ANION GAP SERPL CALCULATED.3IONS-SCNC: 8 MMOL/L (ref 3–14)
APPEARANCE UR: CLEAR
APTT PPP: 36 SEC (ref 22–37)
AST SERPL W P-5'-P-CCNC: 25 U/L (ref 0–45)
AST SERPL W P-5'-P-CCNC: 26 U/L (ref 0–45)
AST SERPL W P-5'-P-CCNC: 27 U/L (ref 0–45)
AST SERPL W P-5'-P-CCNC: 28 U/L (ref 0–45)
BASE DEFICIT BLDA-SCNC: 0.8 MMOL/L
BASE DEFICIT BLDV-SCNC: 0.9 MMOL/L
BASOPHILS # BLD AUTO: 0 10E9/L (ref 0–0.2)
BASOPHILS # BLD AUTO: 0.1 10E9/L (ref 0–0.2)
BASOPHILS NFR BLD AUTO: 0.2 %
BASOPHILS NFR BLD AUTO: 0.5 %
BILIRUB SERPL-MCNC: 0.9 MG/DL (ref 0.2–1.3)
BILIRUB SERPL-MCNC: 1.1 MG/DL (ref 0.2–1.3)
BILIRUB SERPL-MCNC: 1.2 MG/DL (ref 0.2–1.3)
BILIRUB SERPL-MCNC: 1.2 MG/DL (ref 0.2–1.3)
BILIRUB UR QL STRIP: NEGATIVE
BLD GP AB SCN SERPL QL: NORMAL
BLOOD BANK CMNT PATIENT-IMP: NORMAL
BUN SERPL-MCNC: 45 MG/DL (ref 7–30)
BUN SERPL-MCNC: 46 MG/DL (ref 7–30)
BUN SERPL-MCNC: 46 MG/DL (ref 7–30)
BUN SERPL-MCNC: 48 MG/DL (ref 7–30)
CALCIUM SERPL-MCNC: 8.1 MG/DL (ref 8.5–10.1)
CALCIUM SERPL-MCNC: 8.2 MG/DL (ref 8.5–10.1)
CALCIUM SERPL-MCNC: 8.2 MG/DL (ref 8.5–10.1)
CALCIUM SERPL-MCNC: 8.7 MG/DL (ref 8.5–10.1)
CHLORIDE SERPL-SCNC: 105 MMOL/L (ref 94–109)
CHLORIDE SERPL-SCNC: 105 MMOL/L (ref 94–109)
CHLORIDE SERPL-SCNC: 107 MMOL/L (ref 94–109)
CHLORIDE SERPL-SCNC: 107 MMOL/L (ref 94–109)
CO2 BLDCOV-SCNC: 24 MMOL/L (ref 21–28)
CO2 SERPL-SCNC: 24 MMOL/L (ref 20–32)
CO2 SERPL-SCNC: 26 MMOL/L (ref 20–32)
CO2 SERPL-SCNC: 26 MMOL/L (ref 20–32)
CO2 SERPL-SCNC: 27 MMOL/L (ref 20–32)
COLOR UR AUTO: YELLOW
CREAT SERPL-MCNC: 1.99 MG/DL (ref 0.66–1.25)
CREAT SERPL-MCNC: 2.05 MG/DL (ref 0.66–1.25)
CREAT SERPL-MCNC: 2.17 MG/DL (ref 0.66–1.25)
CREAT SERPL-MCNC: 2.27 MG/DL (ref 0.66–1.25)
DIFFERENTIAL METHOD BLD: ABNORMAL
DIFFERENTIAL METHOD BLD: ABNORMAL
EOSINOPHIL # BLD AUTO: 0 10E9/L (ref 0–0.7)
EOSINOPHIL # BLD AUTO: 0.1 10E9/L (ref 0–0.7)
EOSINOPHIL NFR BLD AUTO: 0 %
EOSINOPHIL NFR BLD AUTO: 0.8 %
ERYTHROCYTE [DISTWIDTH] IN BLOOD BY AUTOMATED COUNT: 15.6 % (ref 10–15)
ERYTHROCYTE [DISTWIDTH] IN BLOOD BY AUTOMATED COUNT: 15.7 % (ref 10–15)
ERYTHROCYTE [DISTWIDTH] IN BLOOD BY AUTOMATED COUNT: 15.8 % (ref 10–15)
ETHANOL SERPL-MCNC: 0.02 G/DL
GFR SERPL CREATININE-BSD FRML MDRD: 27 ML/MIN/{1.73_M2}
GFR SERPL CREATININE-BSD FRML MDRD: 28 ML/MIN/{1.73_M2}
GFR SERPL CREATININE-BSD FRML MDRD: 31 ML/MIN/{1.73_M2}
GFR SERPL CREATININE-BSD FRML MDRD: 32 ML/MIN/{1.73_M2}
GLUCOSE BLDC GLUCOMTR-MCNC: 104 MG/DL (ref 70–99)
GLUCOSE BLDC GLUCOMTR-MCNC: 108 MG/DL (ref 70–99)
GLUCOSE BLDC GLUCOMTR-MCNC: 114 MG/DL (ref 70–99)
GLUCOSE SERPL-MCNC: 100 MG/DL (ref 70–99)
GLUCOSE SERPL-MCNC: 107 MG/DL (ref 70–99)
GLUCOSE SERPL-MCNC: 111 MG/DL (ref 70–99)
GLUCOSE SERPL-MCNC: 138 MG/DL (ref 70–99)
GLUCOSE UR STRIP-MCNC: NEGATIVE MG/DL
GRAM STN SPEC: NORMAL
GRAM STN SPEC: NORMAL
HCO3 BLD-SCNC: 24 MMOL/L (ref 21–28)
HCO3 BLDV-SCNC: 25 MMOL/L (ref 21–28)
HCT VFR BLD AUTO: 30.6 % (ref 40–53)
HCT VFR BLD AUTO: 32.1 % (ref 40–53)
HCT VFR BLD AUTO: 32.5 % (ref 40–53)
HCT VFR BLD CALC: 32 %PCV (ref 40–53)
HGB BLD CALC-MCNC: 10.9 G/DL (ref 13.3–17.7)
HGB BLD-MCNC: 10 G/DL (ref 13.3–17.7)
HGB BLD-MCNC: 10.7 G/DL (ref 13.3–17.7)
HGB BLD-MCNC: 10.7 G/DL (ref 13.3–17.7)
HGB UR QL STRIP: ABNORMAL
HYALINE CASTS #/AREA URNS LPF: 7 /LPF (ref 0–2)
IMM GRANULOCYTES # BLD: 0 10E9/L (ref 0–0.4)
IMM GRANULOCYTES # BLD: 0.1 10E9/L (ref 0–0.4)
IMM GRANULOCYTES NFR BLD: 0.2 %
IMM GRANULOCYTES NFR BLD: 0.3 %
INR PPP: 1.6 (ref 0.86–1.14)
INR PPP: 1.65 (ref 0.86–1.14)
INR PPP: 1.7 (ref 0.86–1.14)
INTERPRETATION ECG - MUSE: NORMAL
KETONES UR STRIP-MCNC: NEGATIVE MG/DL
LACTATE BLD-SCNC: 1.4 MMOL/L (ref 0.7–2)
LACTATE BLD-SCNC: 2 MMOL/L (ref 0.7–2)
LACTATE BLD-SCNC: 4.3 MMOL/L (ref 0.7–2)
LACTATE SERPL-SCNC: 1.4 MMOL/L (ref 0.4–2)
LDH SERPL L TO P-CCNC: 215 U/L (ref 85–227)
LEUKOCYTE ESTERASE UR QL STRIP: ABNORMAL
LYMPHOCYTES # BLD AUTO: 0.3 10E9/L (ref 0.8–5.3)
LYMPHOCYTES # BLD AUTO: 0.9 10E9/L (ref 0.8–5.3)
LYMPHOCYTES NFR BLD AUTO: 3.3 %
LYMPHOCYTES NFR BLD AUTO: 4 %
MAGNESIUM SERPL-MCNC: 2.5 MG/DL (ref 1.6–2.3)
MCH RBC QN AUTO: 30.9 PG (ref 26.5–33)
MCH RBC QN AUTO: 31.4 PG (ref 26.5–33)
MCH RBC QN AUTO: 31.5 PG (ref 26.5–33)
MCHC RBC AUTO-ENTMCNC: 32.7 G/DL (ref 31.5–36.5)
MCHC RBC AUTO-ENTMCNC: 32.9 G/DL (ref 31.5–36.5)
MCHC RBC AUTO-ENTMCNC: 33.3 G/DL (ref 31.5–36.5)
MCV RBC AUTO: 94 FL (ref 78–100)
MCV RBC AUTO: 94 FL (ref 78–100)
MCV RBC AUTO: 95 FL (ref 78–100)
MONOCYTES # BLD AUTO: 1 10E9/L (ref 0–1.3)
MONOCYTES # BLD AUTO: 2.4 10E9/L (ref 0–1.3)
MONOCYTES NFR BLD AUTO: 11.4 %
MONOCYTES NFR BLD AUTO: 9.6 %
MUCOUS THREADS #/AREA URNS LPF: PRESENT /LPF
NEUTROPHILS # BLD AUTO: 17.9 10E9/L (ref 1.6–8.3)
NEUTROPHILS # BLD AUTO: 8.8 10E9/L (ref 1.6–8.3)
NEUTROPHILS NFR BLD AUTO: 84.1 %
NEUTROPHILS NFR BLD AUTO: 85.6 %
NITRATE UR QL: NEGATIVE
NRBC # BLD AUTO: 0 10*3/UL
NRBC # BLD AUTO: 0 10*3/UL
NRBC BLD AUTO-RTO: 0 /100
NRBC BLD AUTO-RTO: 0 /100
O2/TOTAL GAS SETTING VFR VENT: ABNORMAL %
O2/TOTAL GAS SETTING VFR VENT: NORMAL %
OXYHGB MFR BLD: 99 % (ref 92–100)
OXYHGB MFR BLDV: 77 %
PCO2 BLD: 40 MM HG (ref 35–45)
PCO2 BLDV: 43 MM HG (ref 40–50)
PCO2 BLDV: 44 MM HG (ref 40–50)
PH BLD: 7.39 PH (ref 7.35–7.45)
PH BLDV: 7.34 PH (ref 7.32–7.43)
PH BLDV: 7.37 PH (ref 7.32–7.43)
PH UR STRIP: 5.5 PH (ref 5–7)
PLATELET # BLD AUTO: 159 10E9/L (ref 150–450)
PLATELET # BLD AUTO: 160 10E9/L (ref 150–450)
PLATELET # BLD AUTO: 166 10E9/L (ref 150–450)
PO2 BLD: 310 MM HG (ref 80–105)
PO2 BLDV: 43 MM HG (ref 25–47)
PO2 BLDV: 47 MM HG (ref 25–47)
POTASSIUM BLD-SCNC: 5.2 MMOL/L (ref 3.4–5.3)
POTASSIUM SERPL-SCNC: 5 MMOL/L (ref 3.4–5.3)
POTASSIUM SERPL-SCNC: 5 MMOL/L (ref 3.4–5.3)
POTASSIUM SERPL-SCNC: 5.2 MMOL/L (ref 3.4–5.3)
POTASSIUM SERPL-SCNC: 5.2 MMOL/L (ref 3.4–5.3)
PROCALCITONIN SERPL-MCNC: 0.1 NG/ML
PROT SERPL-MCNC: 6.2 G/DL (ref 6.8–8.8)
PROT SERPL-MCNC: 6.5 G/DL (ref 6.8–8.8)
PROT SERPL-MCNC: 6.7 G/DL (ref 6.8–8.8)
PROT SERPL-MCNC: 6.9 G/DL (ref 6.8–8.8)
RBC # BLD AUTO: 3.24 10E12/L (ref 4.4–5.9)
RBC # BLD AUTO: 3.4 10E12/L (ref 4.4–5.9)
RBC # BLD AUTO: 3.41 10E12/L (ref 4.4–5.9)
RBC #/AREA URNS AUTO: 157 /HPF (ref 0–2)
SAO2 % BLDV FROM PO2: 79 %
SODIUM BLD-SCNC: 137 MMOL/L (ref 133–144)
SODIUM SERPL-SCNC: 137 MMOL/L (ref 133–144)
SODIUM SERPL-SCNC: 137 MMOL/L (ref 133–144)
SODIUM SERPL-SCNC: 138 MMOL/L (ref 133–144)
SODIUM SERPL-SCNC: 138 MMOL/L (ref 133–144)
SOURCE: ABNORMAL
SP GR UR STRIP: 1.01 (ref 1–1.03)
SPECIMEN EXP DATE BLD: NORMAL
SPECIMEN SOURCE: NORMAL
SQUAMOUS #/AREA URNS AUTO: 1 /HPF (ref 0–1)
TROPONIN I SERPL-MCNC: 0.03 UG/L (ref 0–0.04)
TROPONIN I SERPL-MCNC: 0.43 UG/L (ref 0–0.04)
UROBILINOGEN UR STRIP-MCNC: 2 MG/DL (ref 0–2)
WBC # BLD AUTO: 10.2 10E9/L (ref 4–11)
WBC # BLD AUTO: 21.3 10E9/L (ref 4–11)
WBC # BLD AUTO: 9.7 10E9/L (ref 4–11)
WBC #/AREA URNS AUTO: 9 /HPF (ref 0–5)

## 2019-06-11 PROCEDURE — 25500064 ZZH RX 255 OP 636: Performed by: INTERNAL MEDICINE

## 2019-06-11 PROCEDURE — 25000125 ZZHC RX 250: Performed by: PHYSICIAN ASSISTANT

## 2019-06-11 PROCEDURE — 85014 HEMATOCRIT: CPT

## 2019-06-11 PROCEDURE — 82803 BLOOD GASES ANY COMBINATION: CPT

## 2019-06-11 PROCEDURE — 40000264 ECHOCARDIOGRAM LIMITED

## 2019-06-11 PROCEDURE — 82805 BLOOD GASES W/O2 SATURATION: CPT | Performed by: INTERNAL MEDICINE

## 2019-06-11 PROCEDURE — 25000125 ZZHC RX 250: Performed by: EMERGENCY MEDICINE

## 2019-06-11 PROCEDURE — 85610 PROTHROMBIN TIME: CPT | Performed by: INTERNAL MEDICINE

## 2019-06-11 PROCEDURE — 96375 TX/PRO/DX INJ NEW DRUG ADDON: CPT

## 2019-06-11 PROCEDURE — 80053 COMPREHEN METABOLIC PANEL: CPT | Performed by: INTERNAL MEDICINE

## 2019-06-11 PROCEDURE — A9270 NON-COVERED ITEM OR SERVICE: HCPCS | Mod: GY | Performed by: INTERNAL MEDICINE

## 2019-06-11 PROCEDURE — 83615 LACTATE (LD) (LDH) ENZYME: CPT | Performed by: EMERGENCY MEDICINE

## 2019-06-11 PROCEDURE — 25000128 H RX IP 250 OP 636: Performed by: EMERGENCY MEDICINE

## 2019-06-11 PROCEDURE — 87077 CULTURE AEROBIC IDENTIFY: CPT | Performed by: EMERGENCY MEDICINE

## 2019-06-11 PROCEDURE — 85025 COMPLETE CBC W/AUTO DIFF WBC: CPT | Performed by: EMERGENCY MEDICINE

## 2019-06-11 PROCEDURE — 85610 PROTHROMBIN TIME: CPT | Performed by: EMERGENCY MEDICINE

## 2019-06-11 PROCEDURE — 74176 CT ABD & PELVIS W/O CONTRAST: CPT

## 2019-06-11 PROCEDURE — 86900 BLOOD TYPING SEROLOGIC ABO: CPT | Performed by: INTERNAL MEDICINE

## 2019-06-11 PROCEDURE — 80320 DRUG SCREEN QUANTALCOHOLS: CPT | Performed by: EMERGENCY MEDICINE

## 2019-06-11 PROCEDURE — 40000008 ZZH STATISTIC AIRWAY CARE

## 2019-06-11 PROCEDURE — 86850 RBC ANTIBODY SCREEN: CPT | Performed by: INTERNAL MEDICINE

## 2019-06-11 PROCEDURE — 25000128 H RX IP 250 OP 636: Performed by: INTERNAL MEDICINE

## 2019-06-11 PROCEDURE — 85027 COMPLETE CBC AUTOMATED: CPT | Performed by: PHYSICIAN ASSISTANT

## 2019-06-11 PROCEDURE — 96365 THER/PROPH/DIAG IV INF INIT: CPT

## 2019-06-11 PROCEDURE — 99291 CRITICAL CARE FIRST HOUR: CPT | Mod: 25 | Performed by: INTERNAL MEDICINE

## 2019-06-11 PROCEDURE — 99292 CRITICAL CARE ADDL 30 MIN: CPT | Performed by: INTERNAL MEDICINE

## 2019-06-11 PROCEDURE — 40000671 ZZH STATISTIC ANESTHESIA CASE

## 2019-06-11 PROCEDURE — 82805 BLOOD GASES W/O2 SATURATION: CPT | Performed by: PHYSICIAN ASSISTANT

## 2019-06-11 PROCEDURE — 93005 ELECTROCARDIOGRAM TRACING: CPT

## 2019-06-11 PROCEDURE — 87205 SMEAR GRAM STAIN: CPT | Performed by: PHYSICIAN ASSISTANT

## 2019-06-11 PROCEDURE — 83605 ASSAY OF LACTIC ACID: CPT | Performed by: PHYSICIAN ASSISTANT

## 2019-06-11 PROCEDURE — 84484 ASSAY OF TROPONIN QUANT: CPT | Performed by: INTERNAL MEDICINE

## 2019-06-11 PROCEDURE — 25000128 H RX IP 250 OP 636

## 2019-06-11 PROCEDURE — 80053 COMPREHEN METABOLIC PANEL: CPT | Performed by: EMERGENCY MEDICINE

## 2019-06-11 PROCEDURE — 93010 ELECTROCARDIOGRAM REPORT: CPT | Performed by: INTERNAL MEDICINE

## 2019-06-11 PROCEDURE — 87070 CULTURE OTHR SPECIMN AEROBIC: CPT | Performed by: PHYSICIAN ASSISTANT

## 2019-06-11 PROCEDURE — 83735 ASSAY OF MAGNESIUM: CPT | Performed by: PHYSICIAN ASSISTANT

## 2019-06-11 PROCEDURE — 83605 ASSAY OF LACTIC ACID: CPT | Performed by: INTERNAL MEDICINE

## 2019-06-11 PROCEDURE — 99223 1ST HOSP IP/OBS HIGH 75: CPT | Mod: AI | Performed by: INTERNAL MEDICINE

## 2019-06-11 PROCEDURE — 36600 WITHDRAWAL OF ARTERIAL BLOOD: CPT

## 2019-06-11 PROCEDURE — 84145 PROCALCITONIN (PCT): CPT | Performed by: EMERGENCY MEDICINE

## 2019-06-11 PROCEDURE — 84295 ASSAY OF SERUM SODIUM: CPT

## 2019-06-11 PROCEDURE — 25000125 ZZHC RX 250: Performed by: INTERNAL MEDICINE

## 2019-06-11 PROCEDURE — 86901 BLOOD TYPING SEROLOGIC RH(D): CPT | Performed by: INTERNAL MEDICINE

## 2019-06-11 PROCEDURE — 85610 PROTHROMBIN TIME: CPT | Performed by: PHYSICIAN ASSISTANT

## 2019-06-11 PROCEDURE — 85730 THROMBOPLASTIN TIME PARTIAL: CPT | Performed by: INTERNAL MEDICINE

## 2019-06-11 PROCEDURE — 40000986 XR CHEST PORT 1 VW

## 2019-06-11 PROCEDURE — 87800 DETECT AGNT MULT DNA DIREC: CPT | Performed by: EMERGENCY MEDICINE

## 2019-06-11 PROCEDURE — 87086 URINE CULTURE/COLONY COUNT: CPT | Performed by: EMERGENCY MEDICINE

## 2019-06-11 PROCEDURE — 93321 DOPPLER ECHO F-UP/LMTD STD: CPT | Mod: 26 | Performed by: INTERNAL MEDICINE

## 2019-06-11 PROCEDURE — 87186 SC STD MICRODIL/AGAR DIL: CPT | Performed by: EMERGENCY MEDICINE

## 2019-06-11 PROCEDURE — 99291 CRITICAL CARE FIRST HOUR: CPT | Performed by: PHYSICIAN ASSISTANT

## 2019-06-11 PROCEDURE — 93308 TTE F-UP OR LMTD: CPT | Mod: 26 | Performed by: INTERNAL MEDICINE

## 2019-06-11 PROCEDURE — 00000146 ZZHCL STATISTIC GLUCOSE BY METER IP

## 2019-06-11 PROCEDURE — 84132 ASSAY OF SERUM POTASSIUM: CPT

## 2019-06-11 PROCEDURE — 25800025 ZZH RX 258

## 2019-06-11 PROCEDURE — 81001 URINALYSIS AUTO W/SCOPE: CPT | Performed by: EMERGENCY MEDICINE

## 2019-06-11 PROCEDURE — 70450 CT HEAD/BRAIN W/O DYE: CPT

## 2019-06-11 PROCEDURE — 25000128 H RX IP 250 OP 636: Performed by: PHYSICIAN ASSISTANT

## 2019-06-11 PROCEDURE — 87040 BLOOD CULTURE FOR BACTERIA: CPT | Performed by: EMERGENCY MEDICINE

## 2019-06-11 PROCEDURE — 80053 COMPREHEN METABOLIC PANEL: CPT | Performed by: PHYSICIAN ASSISTANT

## 2019-06-11 PROCEDURE — 40000275 ZZH STATISTIC RCP TIME EA 10 MIN

## 2019-06-11 PROCEDURE — 94002 VENT MGMT INPAT INIT DAY: CPT

## 2019-06-11 PROCEDURE — 71046 X-RAY EXAM CHEST 2 VIEWS: CPT

## 2019-06-11 PROCEDURE — 25000125 ZZHC RX 250

## 2019-06-11 PROCEDURE — 99207 ZZC NO CHARGE LOS: CPT | Performed by: INTERNAL MEDICINE

## 2019-06-11 PROCEDURE — 99285 EMERGENCY DEPT VISIT HI MDM: CPT | Mod: 25

## 2019-06-11 PROCEDURE — 40000061 ZZH STATISTIC EEG TIME EA 10 MIN

## 2019-06-11 PROCEDURE — 31500 INSERT EMERGENCY AIRWAY: CPT | Performed by: REGISTERED NURSE

## 2019-06-11 PROCEDURE — 20000003 ZZH R&B ICU

## 2019-06-11 PROCEDURE — 31500 INSERT EMERGENCY AIRWAY: CPT

## 2019-06-11 PROCEDURE — 25000132 ZZH RX MED GY IP 250 OP 250 PS 637: Mod: GY | Performed by: INTERNAL MEDICINE

## 2019-06-11 PROCEDURE — 25800030 ZZH RX IP 258 OP 636: Performed by: INTERNAL MEDICINE

## 2019-06-11 PROCEDURE — 83605 ASSAY OF LACTIC ACID: CPT | Performed by: EMERGENCY MEDICINE

## 2019-06-11 PROCEDURE — 84484 ASSAY OF TROPONIN QUANT: CPT | Performed by: PHYSICIAN ASSISTANT

## 2019-06-11 PROCEDURE — 5A1935Z RESPIRATORY VENTILATION, LESS THAN 24 CONSECUTIVE HOURS: ICD-10-PCS | Performed by: INTERNAL MEDICINE

## 2019-06-11 PROCEDURE — 95822 EEG COMA OR SLEEP ONLY: CPT

## 2019-06-11 PROCEDURE — 85025 COMPLETE CBC W/AUTO DIFF WBC: CPT | Performed by: INTERNAL MEDICINE

## 2019-06-11 PROCEDURE — 93325 DOPPLER ECHO COLOR FLOW MAPG: CPT | Mod: 26 | Performed by: INTERNAL MEDICINE

## 2019-06-11 PROCEDURE — 36415 COLL VENOUS BLD VENIPUNCTURE: CPT | Performed by: INTERNAL MEDICINE

## 2019-06-11 PROCEDURE — 25800030 ZZH RX IP 258 OP 636: Performed by: PHYSICIAN ASSISTANT

## 2019-06-11 PROCEDURE — 72125 CT NECK SPINE W/O DYE: CPT

## 2019-06-11 RX ORDER — GLYCOPYRROLATE 0.2 MG/ML
0.1 INJECTION, SOLUTION INTRAMUSCULAR; INTRAVENOUS ONCE
Status: COMPLETED | OUTPATIENT
Start: 2019-06-11 | End: 2019-06-11

## 2019-06-11 RX ORDER — NALOXONE HYDROCHLORIDE 0.4 MG/ML
.1-.4 INJECTION, SOLUTION INTRAMUSCULAR; INTRAVENOUS; SUBCUTANEOUS
Status: DISCONTINUED | OUTPATIENT
Start: 2019-06-11 | End: 2019-06-11

## 2019-06-11 RX ORDER — FENTANYL CITRATE 50 UG/ML
50 INJECTION, SOLUTION INTRAMUSCULAR; INTRAVENOUS EVERY 30 MIN PRN
Status: DISCONTINUED | OUTPATIENT
Start: 2019-06-11 | End: 2019-06-11

## 2019-06-11 RX ORDER — FOLIC ACID 1 MG/1
1 TABLET ORAL DAILY
Status: DISCONTINUED | OUTPATIENT
Start: 2019-06-11 | End: 2019-06-19 | Stop reason: HOSPADM

## 2019-06-11 RX ORDER — CHLORHEXIDINE GLUCONATE ORAL RINSE 1.2 MG/ML
15 SOLUTION DENTAL EVERY 12 HOURS
Status: DISCONTINUED | OUTPATIENT
Start: 2019-06-11 | End: 2019-06-12 | Stop reason: CLARIF

## 2019-06-11 RX ORDER — FENTANYL CITRATE 50 UG/ML
INJECTION, SOLUTION INTRAMUSCULAR; INTRAVENOUS
Status: COMPLETED
Start: 2019-06-11 | End: 2019-06-11

## 2019-06-11 RX ORDER — CEFTRIAXONE 2 G/1
2 INJECTION, POWDER, FOR SOLUTION INTRAMUSCULAR; INTRAVENOUS ONCE
Status: COMPLETED | OUTPATIENT
Start: 2019-06-11 | End: 2019-06-11

## 2019-06-11 RX ORDER — NALOXONE HYDROCHLORIDE 0.4 MG/ML
.1-.4 INJECTION, SOLUTION INTRAMUSCULAR; INTRAVENOUS; SUBCUTANEOUS
Status: DISCONTINUED | OUTPATIENT
Start: 2019-06-11 | End: 2019-06-19 | Stop reason: HOSPADM

## 2019-06-11 RX ORDER — LIDOCAINE 40 MG/G
CREAM TOPICAL
Status: DISCONTINUED | OUTPATIENT
Start: 2019-06-11 | End: 2019-06-19 | Stop reason: HOSPADM

## 2019-06-11 RX ORDER — ONDANSETRON 4 MG/1
4 TABLET, ORALLY DISINTEGRATING ORAL EVERY 6 HOURS PRN
Status: DISCONTINUED | OUTPATIENT
Start: 2019-06-11 | End: 2019-06-19 | Stop reason: HOSPADM

## 2019-06-11 RX ORDER — ACETAMINOPHEN 325 MG/1
650 TABLET ORAL EVERY 4 HOURS PRN
Status: DISCONTINUED | OUTPATIENT
Start: 2019-06-11 | End: 2019-06-19 | Stop reason: HOSPADM

## 2019-06-11 RX ORDER — GLYCOPYRROLATE 0.2 MG/ML
.1-.2 INJECTION, SOLUTION INTRAMUSCULAR; INTRAVENOUS EVERY 4 HOURS PRN
Status: DISCONTINUED | OUTPATIENT
Start: 2019-06-11 | End: 2019-06-19 | Stop reason: HOSPADM

## 2019-06-11 RX ORDER — CARVEDILOL 3.12 MG/1
3.12 TABLET ORAL 2 TIMES DAILY WITH MEALS
Status: DISCONTINUED | OUTPATIENT
Start: 2019-06-11 | End: 2019-06-11

## 2019-06-11 RX ORDER — FENTANYL CITRATE 50 UG/ML
25 INJECTION, SOLUTION INTRAMUSCULAR; INTRAVENOUS
Status: DISCONTINUED | OUTPATIENT
Start: 2019-06-11 | End: 2019-06-19 | Stop reason: HOSPADM

## 2019-06-11 RX ORDER — SPIRONOLACTONE 25 MG/1
25 TABLET ORAL DAILY
Status: DISCONTINUED | OUTPATIENT
Start: 2019-06-11 | End: 2019-06-11

## 2019-06-11 RX ORDER — SODIUM CHLORIDE 9 MG/ML
INJECTION, SOLUTION INTRAVENOUS CONTINUOUS
Status: DISCONTINUED | OUTPATIENT
Start: 2019-06-11 | End: 2019-06-15

## 2019-06-11 RX ORDER — ACETAMINOPHEN 650 MG/1
650 SUPPOSITORY RECTAL EVERY 4 HOURS PRN
Status: DISCONTINUED | OUTPATIENT
Start: 2019-06-11 | End: 2019-06-19 | Stop reason: HOSPADM

## 2019-06-11 RX ORDER — NOREPINEPHRINE BITARTRATE 0.06 MG/ML
0.03-0.4 INJECTION, SOLUTION INTRAVENOUS CONTINUOUS
Status: DISCONTINUED | OUTPATIENT
Start: 2019-06-11 | End: 2019-06-11

## 2019-06-11 RX ORDER — ACETAMINOPHEN 325 MG/1
975 TABLET ORAL ONCE
Status: DISCONTINUED | OUTPATIENT
Start: 2019-06-11 | End: 2019-06-11

## 2019-06-11 RX ORDER — ONDANSETRON 2 MG/ML
4 INJECTION INTRAMUSCULAR; INTRAVENOUS EVERY 6 HOURS PRN
Status: DISCONTINUED | OUTPATIENT
Start: 2019-06-11 | End: 2019-06-19 | Stop reason: HOSPADM

## 2019-06-11 RX ORDER — MULTIVITAMIN,THERAPEUTIC
1 TABLET ORAL DAILY
Status: DISCONTINUED | OUTPATIENT
Start: 2019-06-11 | End: 2019-06-19 | Stop reason: HOSPADM

## 2019-06-11 RX ORDER — TORSEMIDE 20 MG/1
20 TABLET ORAL 2 TIMES DAILY
Status: DISCONTINUED | OUTPATIENT
Start: 2019-06-11 | End: 2019-06-11

## 2019-06-11 RX ORDER — AMOXICILLIN 250 MG
2 CAPSULE ORAL 2 TIMES DAILY PRN
Status: DISCONTINUED | OUTPATIENT
Start: 2019-06-11 | End: 2019-06-19 | Stop reason: HOSPADM

## 2019-06-11 RX ORDER — DOXYCYCLINE 100 MG/10ML
100 INJECTION, POWDER, LYOPHILIZED, FOR SOLUTION INTRAVENOUS ONCE
Status: COMPLETED | OUTPATIENT
Start: 2019-06-11 | End: 2019-06-11

## 2019-06-11 RX ORDER — PIPERACILLIN SODIUM, TAZOBACTAM SODIUM 3; .375 G/15ML; G/15ML
3.38 INJECTION, POWDER, LYOPHILIZED, FOR SOLUTION INTRAVENOUS EVERY 6 HOURS
Status: DISCONTINUED | OUTPATIENT
Start: 2019-06-11 | End: 2019-06-17

## 2019-06-11 RX ORDER — AMOXICILLIN 250 MG
1 CAPSULE ORAL 2 TIMES DAILY PRN
Status: DISCONTINUED | OUTPATIENT
Start: 2019-06-11 | End: 2019-06-19 | Stop reason: HOSPADM

## 2019-06-11 RX ADMIN — FENTANYL CITRATE 50 MCG: 50 INJECTION, SOLUTION INTRAMUSCULAR; INTRAVENOUS at 12:52

## 2019-06-11 RX ADMIN — SODIUM CHLORIDE: 9 INJECTION, SOLUTION INTRAVENOUS at 10:00

## 2019-06-11 RX ADMIN — NOREPINEPHRINE BITARTRATE 0.1 MCG/KG/MIN: 1 INJECTION INTRAVENOUS at 07:56

## 2019-06-11 RX ADMIN — SODIUM CHLORIDE 1000 ML: 9 INJECTION, SOLUTION INTRAVENOUS at 09:00

## 2019-06-11 RX ADMIN — GLYCOPYRROLATE 0.1 MG: 0.2 INJECTION, SOLUTION INTRAMUSCULAR; INTRAVENOUS at 16:41

## 2019-06-11 RX ADMIN — HUMAN ALBUMIN MICROSPHERES AND PERFLUTREN 6 ML: 10; .22 INJECTION, SOLUTION INTRAVENOUS at 14:42

## 2019-06-11 RX ADMIN — PIPERACILLIN SODIUM,TAZOBACTAM SODIUM 3.38 G: 3; .375 INJECTION, POWDER, FOR SOLUTION INTRAVENOUS at 18:09

## 2019-06-11 RX ADMIN — FENTANYL CITRATE 50 MCG: 50 INJECTION, SOLUTION INTRAMUSCULAR; INTRAVENOUS at 11:18

## 2019-06-11 RX ADMIN — PIPERACILLIN SODIUM,TAZOBACTAM SODIUM 3.38 G: 3; .375 INJECTION, POWDER, FOR SOLUTION INTRAVENOUS at 12:59

## 2019-06-11 RX ADMIN — CHLORHEXIDINE GLUCONATE 15 ML: 1.2 RINSE ORAL at 08:27

## 2019-06-11 RX ADMIN — PIPERACILLIN SODIUM,TAZOBACTAM SODIUM 3.38 G: 3; .375 INJECTION, POWDER, FOR SOLUTION INTRAVENOUS at 06:19

## 2019-06-11 RX ADMIN — CEFTRIAXONE SODIUM 2 G: 2 INJECTION, POWDER, FOR SOLUTION INTRAMUSCULAR; INTRAVENOUS at 04:14

## 2019-06-11 RX ADMIN — FENTANYL CITRATE 25 MCG: 50 INJECTION INTRAMUSCULAR; INTRAVENOUS at 16:27

## 2019-06-11 RX ADMIN — FENTANYL CITRATE 25 MCG: 50 INJECTION INTRAMUSCULAR; INTRAVENOUS at 15:37

## 2019-06-11 RX ADMIN — DOXYCYCLINE 100 MG: 100 INJECTION, POWDER, LYOPHILIZED, FOR SOLUTION INTRAVENOUS at 05:06

## 2019-06-11 RX ADMIN — FENTANYL CITRATE 25 MCG: 50 INJECTION INTRAMUSCULAR; INTRAVENOUS at 23:39

## 2019-06-11 RX ADMIN — PIPERACILLIN SODIUM,TAZOBACTAM SODIUM 3.38 G: 3; .375 INJECTION, POWDER, FOR SOLUTION INTRAVENOUS at 23:39

## 2019-06-11 RX ADMIN — SODIUM CHLORIDE 1000 ML: 9 INJECTION, SOLUTION INTRAVENOUS at 07:58

## 2019-06-11 RX ADMIN — CHLORHEXIDINE GLUCONATE 15 ML: 1.2 RINSE ORAL at 20:00

## 2019-06-11 RX ADMIN — FENTANYL CITRATE 25 MCG: 50 INJECTION INTRAMUSCULAR; INTRAVENOUS at 20:01

## 2019-06-11 RX ADMIN — FENTANYL CITRATE 25 MCG: 50 INJECTION INTRAMUSCULAR; INTRAVENOUS at 15:03

## 2019-06-11 ASSESSMENT — MIFFLIN-ST. JEOR: SCORE: 1797.75

## 2019-06-11 ASSESSMENT — ACTIVITIES OF DAILY LIVING (ADL)
ADLS_ACUITY_SCORE: 27
ADLS_ACUITY_SCORE: 24

## 2019-06-11 NOTE — PROGRESS NOTES
RECEIVING UNIT ED HANDOFF REVIEW    ED Nurse Handoff Report was reviewed by: Ortiz Licea on June 11, 2019 at 4:38 AM

## 2019-06-11 NOTE — PROGRESS NOTES
"SPIRITUAL HEALTH SERVICES  Spiritual Assessment Progress Note  [facility] ICU   responded to code blue.  Family present (wife, son and daughter).  Pt is [employer] and wife requested that he be given \"the last rites\".    called.  Fr Donis came and anointed pt.      Prior to  coming in wife requested that  pray with pt just in case he  before  came.     provided a prayer and gave emotional support to family.     Family is very quiet and tearful as they process all that is happening.  Appear to be in shock.      SH continues to be available for support as needed.                                                                                                                                           Meenu Mora M.Div., [facility]  Staff    Pager [phone]  "

## 2019-06-11 NOTE — PLAN OF CARE
A&Ox4. VSS on 3L O2. IV infusing anx. Lung sounds diminished. Infrequent congested cough. Denies pain. Bowel sounds active. Skin bruised, Scabs on RLE, redness on RLE. Scar on right upper leg.

## 2019-06-11 NOTE — PLAN OF CARE
OT: orders received and chart reviewed. Pt was initially on station 88, h,owever this AM pt had RRT w/ code blue requiring 4 min of chest compressions. Pt now intubated. Will hold OT eval today.

## 2019-06-11 NOTE — ED NOTES
"[facility]  ED Nurse Handoff Report    ED Chief complaint: Nausea & Vomiting and Fall      ED Diagnosis:   Final diagnoses:   Acute kidney injury (H)   Cough   Pneumonia of right lower lobe due to infectious organism (H)   Acute febrile illness   Non-intractable vomiting with nausea, unspecified vomiting type   Generalized muscle weakness       Code Status: Full Code    Allergies:   Allergies   Allergen Reactions     Ciprofloxacin Itching     Severe itching \"like ants were crawling\"     Hmg-Coa-R Inhibitors Other (See Comments)     Rhabdomyolysis occurred within a couple days       Activity level - Baseline/Home:  Stand with Assist  Activity Level - Current:   Total Care    Patient's Preferred language: english     Needed?: No    Isolation: No  Infection: Not Applicable  Bariatric?: Yes    Vital Signs:   Vitals:    06/11/19 0149 06/11/19 0300   BP: (!) 126/101 107/52   Pulse:  78   Resp:  21   Temp: 101.4  F (38.6  C) 101.4  F (38.6  C)   TempSrc: Oral Oral   SpO2: 93% 93%   Height: 1.88 m (6' 2\")        Cardiac Rhythm: ,        Pain level:      Is this patient confused?: No   Does this patient have a guardian?  No         If yes, is there guardianship documents in the Epic \"Code/ACP\" activity?  No         Guardian Notified?  No  [place] - Suicide Severity Rating Scale Completed?  Yes  If yes, what color did the patient score?  White    Patient Report: Initial Complaint: HX of CAD, CHF, CKD, PE, DVT, atrial fibrillation on Xarelto, subdural hematoma and prostate cancer who presents with nausea and vomiting. Shortly pta  trying to get out of bed and slid to the ground. Denies hitting his head or any LOC.  EMS called due to pt unable to get up and per EMS, this is pt 6th fall in the past month requiring their help and pt is more weak tonight.  Pt has blood coming from penis and stool in his diaper.      Focused Assessment: pt weak, redness on lower leg.    Tests Performed: labs, CT, " CXR  Abnormal Results: 1. Bilateral pleural effusions and associated atelectasis.  2. Patchy infiltrates in the right middle lobe and lingula may be  pneumonia.  3. Cirrhotic liver.  4. Moderate amount of ascites.  5. Colonic diverticula without acute diverticulitis. No bowel  obstruction or inflammation. 6. A few mildly enlarged retroperitoneal  lymph nodes of uncertain significance.  Treatments provided: IV abx    Family Comments: lives with wife at home, wife not here    OBS brochure/video discussed/provided to patient/family: N/A              Name of person given brochure if not patient:                 Relationship to patient:       ED Medications:   Medications   cefTRIAXone (ROCEPHIN) 2 g vial to attach to  ml bag for ADULTS or NS 50 ml bag for PEDS (has no administration in time range)   doxycycline (VIBRAMYCIN) 100 mg vial to attach to  mL bag (has no administration in time range)       Drips infusing?:  No    For the majority of the shift this patient was Green.   Interventions performed were none.    Severe Sepsis OR Septic Shock Diagnosis Present: No    To be done/followed up on inpatient unit:      ED NURSE PHONE NUMBER: *[phone]

## 2019-06-11 NOTE — ED TRIAGE NOTES
Patient states that around 2300 tonight he started feeling nauseous and vomited once. Due to patient feeling increasingly weak he slid out of bed and was unable to get up. Police were called to help get him up (this is a common occurrence for him 5-6 falls in the last 6 months. EMS states he was much weaker than usual.

## 2019-06-11 NOTE — ED NOTES
Bed: ED01  Expected date: 6/11/19  Expected time: 1:36 AM  Means of arrival:   Comments:  E2  74F  Increased weakness/N/V/Fever  0136

## 2019-06-11 NOTE — PROGRESS NOTES
Intensivist cross-cover:  Called by EEG neurologist at [facility] about EEG.  No epileptiform findings, but they reported severe global slowing consistent with diffuse encepahalopathy.  He had gotten a dose of fentanyl shortly before the EEG but it was only 50 mcg and the degree of encephalopathy seen strikes me as being out of proportion to this.  He does have a history of cirrhosis, but was GCS 15 awake and alert prior to his cardiac arrests this morning, so I suspect against hepatic encephalopathy.  On my visit I suctioned the patient deep into his pharynx using a yankaur and he did not reflex or react.  He moans, but does not open eyes spontaneously or engage in other purposeful activity.  I met with his wife, son, and daughter, informed them of these findings, and my increasing suspicions that he may have suffered a brain injury during his cardiac arrests.  I also ensured that they are on board with his goals of care as they stand now.  While they are hopeful that he will recover, I stated that I don't believe that further cpr, re-intubating, re-initiating pressor medications should his blood pressure drop, or otherwise escalating cares will lead to a changed final outcome.  We will therefore continue cares as they are now with DNR/DNI status, and overall no escalation in cares without discussing with family first.  We will continue with antibiotics.    I did discuss re-initiating of fentanyl as the patient is moaning regularly and I am concerned he is in pain.  We discussed the drawback of depression of mental status associated with this medication but all agreed that the benefit of analgesia was more important.    I also reviewed his echo which again showed his aortic stenosis and revealed bi-ventricular globally decreased EF, not markedly changed from 3/1/19.  Finally, I have reviewed his afternoon labs.  He has been oliguric despite fluid bolus's with creat rising to 2.27; his electrolytes are ok including  potassium (5.2).  Lactate is ok at 1.4.  His vbg is within normal at 7.37/43/43/25.  We will continue to manage without escalation of cares for now.  If his clinical situation changes (e.g. Recovery of mental status) we could consider re-evaluation of goals.  His family is agreeable to this.    Diagnoses addressed above:  1.  Encephalopathy following cardiac arrest  2.  Infection causing septic shock leading to cardiac arrest    Total critical care time:  40 min, in addition to and independently of 150 min spent by Dr. Hansen earlier in the day; total time now 190 min.

## 2019-06-11 NOTE — PLAN OF CARE
Pt. Admitted to ICU at 0739 from RRT/code on floor intubated, NS bolus infusing, pt. Was obtunded and unresponsive. brief code of Vtach with pt. requiring compressions X2 minutes, pt. Converted to afib., plan of care discussed with pt's wife, son and daughter, decision made to make pt. DNR/DNI, pt's compassionately extubated and levophed D/C'd, pt. Echo and EEG obtained, Remains in room air and off pressors, MD intensivist aware of low ow urine output through urine catheter, with regular breathing and moaning, fentanyl given prn with good response, received anoiting of  Sick at bedside, pt's wife  updated and will be called if a change in pt. Status occurs.

## 2019-06-11 NOTE — PROCEDURES
Procedure Date: 2019      ELECTROENCEPHALOGRAM -- PORTABLE, STAT              EEG #:  [facility]       SOURCE FILE RECORDIN minutes.      CLINICAL HISTORY:  This patient is a 74-year-old male who presented with cardiac arrest with unresponsiveness after resuscitation.  EEG was requested for evaluation for seizures versus encephalopathy.      CURRENT MEDICATIONS:  No sedations.      TECHNICAL SUMMARY: This EEG monitoring procedure was performed with 23 scalp electrodes in 10-20 electrode system placements, and additional scalp, precordial and other surface electrodes used for electrical referencing and artifact detection.    BACKGROUND ACTIVITIES:  The background activities of this EEG were symmetric and consisted of severe generalized slowing with all delta activity throughout the recording.  Hyperventilation and photic stimulation were not performed.  No clear sleep-waking cycles were observed during this recording.  The patient remained unresponsive throughout the recording.      No interictal epileptiform activities were observed.      ICTAL ACTIVITIES:  No seizures were recorded.      IMPRESSION:  This EEG is markedly abnormal due to the presence of severe generalized slowing of background activities which is consistent with severe diffuse encephalopathy.  No seizures were recorded.         JESSICA CONTRERAS MD             D: 2019   T: 2019   MT: KAREN      Name:     GALILEA LUGO   MRN:      [id number]        Account:        [id number]   :      1945           Procedure Date: 2019      Document: [id number]

## 2019-06-11 NOTE — LETTER
Transition Communication Hand-off for Care Transitions to Next Level of Care Provider    Name: Antonio Ramirez  : 1945  MRN #: [id number]  Primary Care Provider: Main Hardy     Primary Clinic: [address] [facility] [address]  [place] [address]     Reason for Hospitalization:  Cough [R05]  Generalized muscle weakness [M62.81]  Acute febrile illness [R50.9]  Acute kidney injury (H) [N17.9]  Pneumonia of right lower lobe due to infectious organism (H) [J18.1]  Non-intractable vomiting with nausea, unspecified vomiting type [R11.2]  Admit Date/Time: 2019  1:41 AM  Discharge Date: 2019    Payor Source: Payor: MEDICARE / Plan: MEDICARE / Product Type: Medicare /     Readmission Assessment Measure (MANSOOR) Risk Score/category: Elevated     Reason for Communication Hand-off Referral: Other Elevated MANSOOR and discharging to [facility] TCU      Needs      Most Recent Value   Equipment Currently Used at Home  walker, rolling, prosthesis          Future Appointments   Date Time Provider Department Center   2019  3:45 PM [facility]   2019  6:00 AM Sasha Wiley, SLP [facility]   2019  3:00 PM Main Hardy MD [facility]        Any outstanding tests or procedures:        Referrals     Future Labs/Procedures    Occupational Therapy Adult Consult     Comments:    Evaluate and treat as clinically indicated.    Reason: Acute illness debilitation from acute illness    Physical Therapy Adult Consult     Comments:    Evaluate and treat as clinically indicated.    Reason: Acute illness debilitation from hospitalization            Key Recommendations:   Patient living with spouse prior to admit.  Admitted with Community-acquired pneumonia versus aspiration pneumonia.  Apparently, he called EMS 6 times in the last month for recurrent falls.  During hospital stay patient had 2 code blues.  At one point patient was DNR/DNI and was going to transition to hospice but after Palliative  Care consult, pt and pt's spouse decided to change code status to full code and pursue restorative care.  PT recommended TCU.  Patient discharge to [facility].    Thank you for following up with patient after he discharges home from TCU,    Shelia Pichardo    AVS/Discharge Summary is the source of truth; this is a helpful guide for improved communication of patient story

## 2019-06-11 NOTE — PROGRESS NOTES
Patient compassionately extubated to room air at 1123 per MD order.    Juli Collins, RRT  6/11/2019

## 2019-06-11 NOTE — PROGRESS NOTES
[facility]  Comprehensive Daily ICU Note        Antonio Ramirez MRN# [id number]   Age: 74 year old YOB: 1945     Date of Admission: 6/11/2019    Primary care provider: Main Hardy     CODE STATUS: FULL      Problem List:   Cardiac arrest x 2  Encephalopathy, likely from anoxia  Shock - probably septic but cardiogenic also possible  Thrombocytopenia and anemia  Pneumonia  Alcoholic cirrhosis  ANSELMO            Subjective/ Last 24 hours:   Mr. Ramirez is a 74 year old man with multiple multiple medical problems, most significantly in the acute sense:  - alcoholic liver disease with ongoing alcohol use and no interest in quitting  - general decline in his overall health in the last few months with multiple falls and severely impaired mobility  - Subdural hematoma in 2018 causing significant impairment in cognition. Estimated percentile of cognitive function 9% on assessment in rehab. Family feels this assessment greatly underestimated his overall level of functioning.   - multiple infections.   He was admitted to the hospital on 6/10 for weakness, cough, hypoxia and vomiting.  Wife reports that he had been more weak in the last few weeks. Also reports that late yesterday evening he was complaining of being cold and developed significant chills.  Had a paracentesis day before admission.     Was presumptively diagnosed with pneumonia and admitted to the general medical connolly. Early AM some time in the space of 30 minutes became unresponsive and developed PEA. Received 5 minutes of CPR with ROSC. Transferred to ICU where he has remained unresponsive other than flexor posturing to painful stimuli. Had extensive conversation with family. Discussed his acute condition as well as his extensive medical history and elected to proceed with support for a few days. Shortly thereafter while I was in the room patient went into non perfusing rhythm that appeared to be VTach. Received about 2 minutes of  CPR with return of spontaneous circulation. Given this event the family elected to transition to comfort care.  For the moment has stable vital signs and is now moaning but not responding to any painful stimuli.          Mechanical Ventilation/Vitalsigns/IsandOs:   Temp:  [101.4  F (38.6  C)-101.6  F (38.7  C)] 101.6  F (38.7  C)  Pulse:  [54-78] 78  Heart Rate:  [71-85] 75  Resp:  [16-21] 20  BP: ()/() 107/56  SpO2:  [93 %-98 %] 98 %    Day since 6/11    Peak airway pressure: mid 20s         Physical Examination:     General: Stated age, intubated  HEENT: HERBERT, roving eye movements  Lungs: LCTAB anteriorly  CVS: RRR  Abdomen: +BS, soft, non distended  Extremities/musculoskeletal: left amputated leg, diffuse edema, warm  Neurology: posturing to painful stimuli, cranial nerves intact, no response to verbal stimuli  Skin: pressure dressing over the sacrum, approximately 4cm long area of redness over the middle spine  Psychiatry: unable  Exam of Line sites:  No lines     Hospital Procedures     CXR  CT scans          Feeding/Glucose:   138    Blood glucose/Insulin requirement last 24 hours: none         Medications:       sodium chloride 0.9%  1,000 mL Intravenous Once     carvedilol  3.125 mg Oral BID w/meals     chlorhexidine  15 mL Mouth/Throat Q12H     folic acid  1 mg Oral Daily     multivitamin, therapeutic  1 tablet Oral Daily     piperacillin-tazobactam  3.375 g Intravenous Q6H     sodium chloride (PF)  3 mL Intracatheter Q8H     spironolactone  25 mg Oral Daily     torsemide  20 mg Oral BID        norepinephrine       - MEDICATION INSTRUCTIONS -                Labs/Diagnostic studies:     EKG:  sinus    Echo:  EF of 35-40%, LVH, multiple areas of hypokinesis, RV overload, RV mildly to moderately reduced, LA and RA dilated, severe, aortic stenosis, moderate mitral regurgitation, moderate to severe tricuspid regurgitation and severe pulmonary hypertension in March of 2019    ROUTINE ICU LABS (Last  four results)  CMP  Recent Labs   Lab 06/11/19  0710 06/11/19  0205    138   POTASSIUM 5.0 5.0   CHLORIDE 105 105   CO2 24 26   ANIONGAP 8 7   * 100*   BUN 46* 45*   CR 2.05* 1.99*   GFRESTIMATED 31* 32*   GFRESTBLACK 35* 37*   BENNIE 8.2* 8.7   MAG 2.5*  --    PROTTOTAL 6.7* 6.9   ALBUMIN 2.5* 2.5*   BILITOTAL 0.9 1.1   ALKPHOS 88 93   AST 27 25   ALT 11 11     CBC  Recent Labs   Lab 06/11/19  0710 06/11/19  0205 06/10/19  0738   WBC 9.7 10.2  --    RBC 3.41* 3.24*  --    HGB 10.7* 10.0*  --    HCT 32.5* 30.6*  --    MCV 95 94  --    MCH 31.4 30.9  --    MCHC 32.9 32.7  --    RDW 15.8* 15.7*  --     166 134*     INR  Recent Labs   Lab 06/11/19  0205 06/10/19  0738   INR 1.60* 1.84*     Arterial Blood Gas  Recent Labs   Lab 06/11/19  0835   PH 7.39   PCO2 40   PO2 310*   HCO3 24   O2PER Ventilator       Other Lab Data:  Lactic acid: 4.3    Cultures:  Recent Labs   Lab 06/11/19  0220   CULT PENDING     Blood culture:  Invalid input(s): BC   Urine culture:  No results for input(s): URC in the last 168 hours.            Imaging:     CXR:  Bilateral pulmonary infiltrates, right more than left and very significant    CT:  Cirrhosis and pulmonary infiltrates         Assessment and Plan and more detailed hospital course:     Summary:  Antonio Ramirez is a 74 year old male admitted on 6/11/2019 for pneumonia. He received antibiotics for this but had 2x cardiac arrest. This combined with poor premorbid condition resulted in a decision to transition to comfort care.More details on hospital course are below.     Neurology and Psychiatry:  Baseline impaired cognitive function now with likely anoxic brain injury based on documented time without pulse as well as examination. Degree of anticipated impairment unclear but has prior brain injury that would make him more fragile. Comatose despite receiving no sedation. Could not rule out seizure as a cause of his acute loss of consciousness. Intermittent rhythmic  tongue movements. However, now that he has been extubated for a period of time has started moaning.  - Hold sedatives.   - EEG now  - Judicious use of pain medications if needed    Pulmonary:   Pneumonia with hypoxic respiratory failure and now continued need for ventilator due to encephalopathy.  Patient 74 inches tall so was placed on tidal volume appropriate for his height. ABG showed normal ventilation and good oxygenation. Patient was extubated to room air in anticipation of death. Is now stable on room air with oxygen saturations in the high 80s.  - Patient underwent lung protective ventilation      Cardiovascular system:   Baseline cardiomyopathy as well as multiple valve abnormalities. Transferred to ICU after cardiac arrest initially presumed to be secondary to respiratory arrest. In shock, presumed septic and placed on vasopressors, although TTE was not done.  In ICU had another spontaneous cardiac arrest making likelihood of primary cardiac arrest as cause of his event upstairs more likely. Discussed CPR with family as well as my inability to identify anything reversible that would prevent future cardiac arrest and family elected to transition to comfort care.     Renal/Electrolytes:  Oliguric ANSELMO. Related to infection on top of baseline CKD. UOP had markedly dropped off prior to transitioning to comfort care so presumably this is worsening.   - Recheck BMP this afternoon if patient is still alive.      ID:  Likely pneumonia based on his symptoms, vital signs and XRAY, as well as his predisposition to infection. Cultures are pending.  - Continue antibiotics.     GI/:  Alcoholic cirrhosis. Meld in 20s. Requiring frequent paracentesis and received one prior to admission.     Nutrition:   No issues    Musculoskeletal/Rheumatology/skin:  Baseline weakness and neuropathy causing gait instability with multiple falls and extremely impaired mobility.  Also pressure ulcer present on admission.  - Plans for  receiving PT  - Was receiving wound care.     Endocrine:   No issues    Heme/Onc:  Fixed coagulopathy from alcoholic liver disease as well as macrocytic anemia, probably from same and chronic disease.    Billing: total time spend providing critical care was 2 hours and 30 minutes, excluding procedure time and CPR, and including extensive counseling of family    Anuja Hansen MD  [phone]

## 2019-06-11 NOTE — CODE/RAPID RESPONSE
[facility]  House NANCY Code Blue Note  6/11/2019   Time Called: 0657  RRT called for: apnea/unresponsiveness  Code Status: Full Code    Assessment & Plan   I was paged to the bedside to evaluate . Antonio Ramirez for an acute episode of unresponsiveness. Bedside RN reports she checked on the patient around 0615 and he was joking around stating he had no pain, and was oxygenating high 90% on 3L NC. Upon her arrival to check on patient just before 0700 shift change, the patient was minimally responsive, foaming at the mouth and spitting foam out. Initially called as an RRT, I presented to the bedside and patient reportedly had a pulse however the patient was unresponsive, apneic, and I was unable to palpate a femoral pulse. A code blue was immediately called.     Diagnosis: Cardiac arrest related to hypoxia and respiratory arrest  May be related to sepsis, mucous plugging, possible PE although pt is on Xarelto, vs. Other etiology.    Interventions ordered/provided:   - Code Blue: ACLS protocol followed, received 1mg epinephrine, chest compressions for approximately 4 minutes before achieving ROSC, fluids wide open at 999 ml/hr, 2nd IV placed by flyer  - Intubated by anesthesiology, emergent and pt unresponsive, no RSI meds used  - Glucose 104    After the above resuscitation, at pulse check the patient had a HR 90s-110s, sBP 120s-140s. We slowed down fluids. End tidal variable but mid 20s to low 40s.Initially oxygenated 88-92% but able to increase to >94%.        Post arrest cares:   - EKG - compared to earlier EKG today, appears to have slight new ST depression   - CXR - moderate sized Rt pleural effusion worse than earlier today (compared to prior CXR from ~0300), ETT ~2cm above akin. Vascular congestion.  - ABG pending, defer to ICU  -  ml/hr. Titrated for BP while transporting to ICU  - Labs: CMP, Lacatic acid, Troponin, CBC, INR, blood cultures drawn a few hours ago  - Ordered  norepinephrine gtt, 200mcg phenylephrine given while norepi drip being sent over as BPs downtrended to 70s/40s upon arrival to ICU  - ECHO ordered  - Intensivist consultation    At the conclusion of this CODE BLUE, the pt was intubated in the ICU, sign off to Dr. Brooks (will update spouse, info provided) and MILENA May, APRN, CNP completed, labs pending, and starting on Levophed drip for pressure support.    Interval History   Mr. Antonio Ramirez is a 74-year-old gentleman with complex past medical history of HTN, dyslipidemia, CAD, status post coronary artery bypass graft, history of alcoholic liver cirrhosis with portal hypertension and ascites, status post multiple paracenteses, history of systolic CHF, valvular disease, pAfib on chronic anticoagulation with Xarelto, history of antiphospholipid antibody syndrome with history of DVT and PE, status post IVC filter in place, history of MSSA bacteremia with history of a LT BKA, and a history of traumatic subdural hematoma, who was brought in for evaluation of generalized weakness, cough and vomiting and admitted with presumed CAP vs aspiration pneumonia early this morning around 0300. Noted to be febrile with Temp ranging 101s. Given Ceftraixone and doxycycline in the ED and subsequently started on Zosyn. Admitted early this morning, the day of the code blue.    His history is significant for:  Past Medical History:   Diagnosis Date     Alcoholism (H)      Amputated left leg (H)      Anemia      Anticardiolipin antibody syndrome (H)      Antiphospholipid antibody syndrome (H)      Antiplatelet or antithrombotic long-term use      Aortic root dilatation (H)      Aortic stenosis      Arthritis      Balance problem      Coronary artery disease     CABG 2007: SVG to LAD, SVG to diagonal, SVG to OM; cardiac cath 5/17/18: thrombectomy for restenosis of stents-sent for urgent CABG, cath 5/14/2007: GRECIA x2 to LAD, [place] to diagonal     Gastro-oesophageal reflux disease      Heart  attack (H) 2007    apparently arrested, cardiac X5     Hiatal hernia      Hypercholesterolemia      Hypertension      Ischemic cardiomyopathy      Left foot drop      Liver disease     alcoholic liver disease, alcoholic cirrohsosis, portal hypertension     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)     ?When diagnosed, patient not aware of this     Moderate mitral regurgitation      Monoclonal gammopathy      Neuropathy      Noninfectious ileitis     diverticulitis of colon     Nonrheumatic tricuspid valve regurgitation      Paroxysmal atrial fibrillation (H)      PE (pulmonary embolism) 2006    saddle emboli 2006     Prostate cancer (H) 2000    Treated with radiation (seed implants in 2000) -- no problems since     Pulmonary hypertension (H)      Renal disease     kidney stones     Syncope      Thrombocytopenia (H)      Urethral stricture      Venous thrombosis     IVC filter and lysis procedures 2007     Past Surgical History:   Procedure Laterality Date     AMPUTATE LEG BELOW KNEE Left 04/2014    related to gangrene, started as foot ulcer related to neuropathy     AMPUTATE LEG BELOW KNEE Left 12/4/2017    Procedure: AMPUTATE LEG BELOW KNEE;  Exploration of Left Leg Below Knee Amputation Stump with Ligation of Bleeders.;  Surgeon: Leandro Arreola MD;  Location:  OR     APPENDECTOMY       APPLY WOUND VAC Left 12/6/2017    Procedure: APPLY WOUND VAC;;  Surgeon: Saima Howard MD;  Location:  SD     ARTHROPLASTY HIP Right 11/27/2018    Procedure: RIGHT TOTAL HIP ARTHROPLASTY;  Surgeon: Saima Howard MD;  Location:  OR     ARTHROPLASTY KNEE Right 9/19/2014    Procedure: ARTHROPLASTY KNEE;  Surgeon: Saima Howard MD;  Location:  OR     CARDIAC SURGERY      CABG     CHOLECYSTECTOMY       COLONOSCOPY       COMBINED CYSTOSCOPY, RETROGRADES, EXCHANGE STENT URETER(S) Left 7/24/2018    Procedure: COMBINED CYSTOSCOPY, RETROGRADES, EXCHANGE STENT URETER(S);  CYSTOSCOPY, BILATERAL RETROGRADES,  BILATERAL URETERAL STENT EXCHANGE ;  Surgeon: Matt Cervantes MD;  Location:  OR     CRANIOTOMY Right 4/7/2018    Procedure: CRANIOTOMY;  Right Craniotomy For Subdural Hematoma;  Surgeon: Jono Issa MD;  Location:  OR     CYSTOSCOPY, DILATE URETHRA, COMBINED N/A 10/12/2016    Procedure: COMBINED CYSTOSCOPY, DILATE URETHRA;  Surgeon: Dimitry Bello MD;  Location:  OR     CYSTOSCOPY, REMOVE STENT(S), COMBINED Right 7/24/2018    Procedure: COMBINED CYSTOSCOPY, REMOVE STENT(S);;  Surgeon: Jose Hunter MD;  Location:  OR     ENDOSCOPIC STRIPPING VEIN(S)       esophagogastroduodenoscopy       ESOPHAGOSCOPY, GASTROSCOPY, DUODENOSCOPY (EGD), COMBINED N/A 3/11/2019    Procedure: COMBINED ESOPHAGOSCOPY, GASTROSCOPY, DUODENOSCOPY (EGD), BIOPSY SINGLE OR MULTIPLE;  Surgeon: Katherin Boyd MD;  Location:  GI     EYE SURGERY      cataracts     GENITOURINARY SURGERY      Prostate Seen Implants     HERNIA REPAIR      Umbilical     INCISION AND DRAINAGE LOWER EXTREMITY, COMBINED Left 12/1/2017    Procedure: COMBINED INCISION AND DRAINAGE LOWER EXTREMITY;  INCISION AND DRAINAGE OF LEFT BELOW KNEE AMPUTATION ABSCESS, WOUND VAC PLACEMENT;  Surgeon: Saima Howard MD;  Location:  OR     IR IVC FILTER PLACEMENT       IRRIGATION AND DEBRIDEMENT LOWER EXTREMITY, COMBINED Left 12/6/2017    Procedure: COMBINED IRRIGATION AND DEBRIDEMENT LOWER EXTREMITY;  IRRIGATION AND DEBRIDEMENT OF LEFT BELOW KNEE AMPUTATION SITE WITH WOUND VAC REPLACEMENT;  Surgeon: Saima Howard MD;  Location:  SD     IRRIGATION AND DEBRIDEMENT LOWER EXTREMITY, COMBINED Left 12/8/2017    Procedure: COMBINED IRRIGATION AND DEBRIDEMENT LOWER EXTREMITY;  IRRIGATION AND DEBRIDEMENT OF LEFT BELOW THE KNEE AMPUTATION AND SHORTENING OF THE TIBIA WITH WOUND CLOSURE;  Surgeon: Saima Howard MD;  Location:  OR     LASER HOLMIUM LITHOTRIPSY URETER(S), INSERT STENT, COMBINED Bilateral 6/28/2018     "Procedure: COMBINED CYSTOSCOPY, URETEROSCOPY, LASER HOLMIUM LITHOTRIPSY URETER(S), INSERT STENT;  CYSTOSCOPY, BILATERAL URETEROSCOPY,  HOLMIUM LASER LITHOTRIPSY, BILATERAL STENT PLACEMENT, STONE BASKETING;  Surgeon: Jose Hunter MD;  Location:  OR     LASER HOLMIUM LITHOTRIPSY URETER(S), INSERT STENT, COMBINED Left 8/14/2018    Procedure: COMBINED CYSTOSCOPY, URETEROSCOPY, LASER HOLMIUM LITHOTRIPSY URETER(S), INSERT STENT;  CYSTOSCOPY, LEFT URETEROSCOPY, LEFT LASER HOLMIUM LITHOTRIPSY URETER(S) REMOVAL BILATERAL STENTS;  Surgeon: Jose Hunter MD;  Location:  OR     ORTHOPEDIC SURGERY      (R) knee scope     ORTHOPEDIC SURGERY      total hip      ORTHOPEDIC SURGERY      elbow surgery       Allergies   Allergies   Allergen Reactions     Ciprofloxacin Itching     Severe itching \"like ants were crawling\"     Hmg-Coa-R Inhibitors Other (See Comments)     Rhabdomyolysis occurred within a couple days       Physical Exam  - Initial exam on presentation to bedside  Physical Exam   Constitutional: He appears well-developed and well-nourished.   HENT:   Head: Normocephalic and atraumatic.   Eyes: Conjunctivae are normal.   Pulmonary/Chest: Apnea noted.   No chest rise.   Neurological: He is unresponsive. GCS eye subscore is 1. GCS verbal subscore is 1. GCS motor subscore is 1.   Skin: He is diaphoretic. There is pallor.       Vital Signs with Ranges:  Temp:  [101.4  F (38.6  C)-101.6  F (38.7  C)] 101.6  F (38.7  C)  Pulse:  [54-78] 78  Heart Rate:  [71-85] 75  Resp:  [16-21] 20  BP: ()/() 107/56  SpO2:  [93 %-98 %] 98 %  No intake/output data recorded.    Data     EKG:  Interpreted by Niki Rivera PA-C  Time reviewed: 0710  Symptoms at time of EKG: post cardiac arrest   Rhythm: atrial fibrillation - rapid  Rate: 100-110  Axis: Normal  Ectopy: none  Conduction: normal  ST Segments/ T Waves: 1mm ST depression in leads II, III, V4, V5  Q Waves: none  Comparison to prior: new slight ST " depression as above  Clinical Impression: new very slight ST depression as noted above post arrest    ABG:  -No lab results found in last 7 days.    Troponin:    Recent Labs   Lab Test 06/11/19  0710   TROPI 0.027       IMAGING: (X-ray/CT/MRI)   Recent Results (from the past 24 hour(s))   US Paracentesis    Narrative    EXAM: US PARACENTESIS  6/10/2019 9:03 AM       HISTORY:Alcoholic cirrhosis of liver with ascites (H)       PROCEDURE:  Written informed consent was obtained from the patient  prior to the procedure. The risks and benefits including bleeding,  infection and abdominal organ injury were discussed and the patient  wished to continue. Initial ultrasound images demonstrated a large  right lower quadrant fluid collection. A permanent image was saved.  The skin overlying this collection was marked, prepped, draped and  anesthetized in usual sterile fashion utilizing 10 mL of lidocaine 1%.  The paracentesis catheter was then placed into the peritoneal fluid  collection with return of 5400 mL of blood-tinged yellow fluid under  ultrasound guidance. Estimated blood loss during the procedure was  less than 5 mL. No specimens collected. Patient tolerated the  procedure well. The patient will receive intravenous albumin on the  usual sliding scale as needed per protocol.        Impression    IMPRESSION:  Ultrasound-guided paracentesis.      NAYA CARDENAS PA-C   Head CT w/o contrast    Narrative    CT SCAN OF THE HEAD WITHOUT CONTRAST   6/11/2019 3:02 AM     HISTORY: Altered level of consciousness (LOC), unexplained; fall,  history of subdermal hematoma.    TECHNIQUE:  Axial images of the head and coronal reformations without  IV contrast material. Radiation dose for this scan was reduced using  automated exposure control, adjustment of the mA and/or kV according  to patient size, or iterative reconstruction technique.    COMPARISON: 2/17/2019.    FINDINGS:  There is generalized atrophy of the brain.  There is  low  attenuation in the white matter of the cerebral hemispheres consistent  with sequelae of small vessel ischemic disease. There is no evidence  of intracranial hemorrhage, mass, acute infarct or anomaly.     The visualized portions of the sinuses and mastoids appear normal.  There is no evidence of trauma. Old right craniotomy.      Impression    IMPRESSION: No acute abnormality.      MALCOM SANTOS MD   Cervical spine CT w/o contrast    Narrative    CT CERVICAL SPINE W/O CONTRAST  6/11/2019 3:02 AM      HISTORY: Fall. Neck pain.    TECHNIQUE: Multiplanar imaging of the cervical spine without  intravenous contrast. Radiation dose for this scan was reduced using  automated exposure control, adjustment of the mA and/or kV according  to patient size, or iterative reconstruction technique.     COMPARISON:  None.    FINDINGS: There is no acute fracture or traumatic malalignment. There  is extensive multilevel degenerative disc and facet disease. There is  mild spinal stenosis at L3-L4. The paraspinal soft tissues show no  acute abnormalities. There is atherosclerotic calcification of the  carotid arterial system. Lung apices are unremarkable.      Impression    IMPRESSION: No acute traumatic abnormality.    MALCOM SANTOS MD   XR Chest 2 Views    Narrative    XR CHEST 2 VW   6/11/2019 3:05 AM     HISTORY: Cough.    COMPARISON: 3/3/2019.    FINDINGS: Sternal wires and mediastinal clips. No pneumothorax. There  are bilateral pleural effusions, right greater than left. Mild right  basilar infiltrate. The lungs are otherwise clear. The thoracic aorta  is calcified. Degenerative disease in the thoracic spine.      Impression    IMPRESSION: Bilateral pleural effusions and mild right basilar  atelectasis or pneumonia.    MALCOM SANTOS MD   CT Abdomen Pelvis w/o Contrast    Narrative    CT ABDOMEN PELVIS W/O CONTRAST   6/11/2019 3:54 AM     HISTORY: Abdominal pain, fever, abscess suspected; Abdominal  distension;  Abdominal pain, unspecified; Nausea, vomiting.    TECHNIQUE: Noncontrast CT abdomen and pelvis was performed. Radiation  dose for this scan was reduced using automated exposure control,  adjustment of the mA and/or kV according to patient size, or iterative  reconstruction technique.    COMPARISON: 7/24/2018.    FINDINGS:  Abdomen: There are bilateral pleural effusions, right larger than  left. There is bibasilar atelectasis. Mild patchy infiltrates in the  right middle lobe and lingula may be pneumonia. Previous median  sternotomy. Imaging of the solid abdominal organs is limited by the  lack of intravenous contrast. The liver is small and nodular  consistent with cirrhosis. The gallbladder is absent. The spleen,  pancreas and right adrenal gland are normal in appearance. There is a  1.4 cm left adrenal gland nodule which is indeterminate. There are a  few stones within the left kidney. No right-sided urinary stone. No  ureteral stone or hydronephrosis on either side. There is an IVC  filter in place. There are a few mildly enlarged retroperitoneal lymph  nodes. An interaortocaval node on image #40 measures 2.0 x 1.8 cm,  larger than on the previous exam. There is atherosclerotic  calcification of the aorta and its branches. No aneurysm.    Pelvis: Bilateral hip arthroplasties cause extensive streak artifact  through the inferior pelvis. There are radiation seeds in the prostate  gland. Colonic diverticula without acute diverticulitis. No bowel  obstruction. There is a moderate amount of ascites. No free  intraperitoneal gas. Degenerative disease throughout the spine.      Impression    IMPRESSION:  1. Bilateral pleural effusions and associated atelectasis.  2. Patchy infiltrates in the right middle lobe and lingula may be  pneumonia.  3. Cirrhotic liver.  4. Moderate amount of ascites.  5. Colonic diverticula without acute diverticulitis. No bowel  obstruction or inflammation.   6. A few mildly enlarged  retroperitoneal lymph nodes of uncertain  significance.    MALCOM SANTOS MD   XR Chest Port 1 View    Narrative    CHEST ONE VIEW PORTABLE  June 11, 2019 7:15 AM     HISTORY: Post code, intubation.    COMPARISON: 6/11/2019.      Impression    IMPRESSION: The tip of the endotracheal tube is 2.4 cm above the  akin. Moderate size right pleural effusion. No pneumothorax on  either side. There is vascular engorgement and interstitial thickening  suggestive of congestive heart failure. Median sternotomy wires and  defibrillator pads noted.    LESTER BROWN MD       CBC with Diff:  Recent Labs   Lab Test 06/11/19  0710   WBC 9.7   HGB 10.7*   MCV 95      INR 1.65*     Lactic Acid:    Lactic Acid   Date Value Ref Range Status   06/11/2019 4.3 (HH) 0.7 - 2.0 mmol/L Final     Comment:     Critical Value called to and read back by  LEO GARRIDO ON ST 88 AT 0723 BK     06/11/2019 2.0 0.7 - 2.0 mmol/L Final   07/23/2018 1.8 0.7 - 2.0 mmol/L Final     No results found for: LACTS     Comprehensive Metabolic Panel:  Recent Labs   Lab 06/11/19  0710      POTASSIUM 5.0   CHLORIDE 105   CO2 24   ANIONGAP 8   *   BUN 46*   CR 2.05*   GFRESTIMATED 31*   GFRESTBLACK 35*   BENNIE 8.2*   MAG 2.5*   PROTTOTAL 6.7*   ALBUMIN 2.5*   BILITOTAL 0.9   ALKPHOS 88   AST 27   ALT 11       INR:    Recent Labs   Lab Test 06/11/19  0710   INR 1.65*       UA:  Recent Labs   Lab 06/11/19  0220   COLOR Yellow   APPEARANCE Clear   URINEGLC Negative   URINEBILI Negative   URINEKETONE Negative   SG 1.015   UBLD Moderate*   URINEPH 5.5   PROTEIN 10*   NITRITE Negative   LEUKEST Small*   RBCU 157*   WBCU 9*       Time Spent on this Encounter   I spent 48 minutes (2595 - 1121) of critical care time on the unit/floor managing the care of Antonio Ramirez. Upon evaluation, this patient had a high probability of imminent or life-threatening deterioration due to respiratory arrest, which required my direct attention, intervention, and  personal management. 100% of my time was spent at the bedside counseling the patient and/or coordinating care regarding services listed in this note. Code blue was conducted in correlation with DIPAK Hernandez, CNP.    Niki Rivera PA-C  Hospitalist/House NANCY  Pager: [phone]

## 2019-06-11 NOTE — H&P
Admitted:     06/11/2019     DATE OF SERVICE: 06/11/2019     PRIMARY CARE PHYSICIAN:  Main Hardy MD      CHIEF COMPLAINT:  Vomiting, cough and generalized weakness.      HISTORY OF PRESENT ILLNESS:  Had been obtained from the patient partially, although he is a poor historian.  I did discuss with ER attending, Dr. Mercado, and I reviewed his chart extensively.      Mr. Antonio Ramirez is a 74-year-old gentleman with complex past medical history of alcoholic liver cirrhosis, ongoing alcohol drinking, hypertension, dyslipidemia, coronary artery disease, status post coronary artery bypass graft x 3 vessels, history of atrial fibrillation on chronic anticoagulation with Xarelto, antiphospholipid antibody syndrome with history of DVT and PE, status post IVC filter in place, chronic kidney disease, stage III, history of left BKA, traumatic subdural hematoma, gastroesophageal reflux disease and peripheral neuropathy, who was brought in for evaluation of generalized weakness, cough and vomiting.      The patient has multiple comorbidities, including status post left BKA.  He lives at home with his wife.  At baseline, he uses a walker or a wheelchair for ambulation.  He had a recent admission to [facility] from 02/28/2019 through 03/14/2019 for MSSA bacteremia and a traumatic left leg hematoma.  He was seen by ID, and he was discharged to a TCU on IV Ancef for 2 more weeks.  Also, patient has a known history of alcoholic liver cirrhosis with ascites, portal hypertension and anasarca.  He had multiple paracenteses done.  Last paracentesis done as outpatient was actually yesterday, 06/10/2019, when 5400 mL of blood-tinged yellow fluid was drained.  Apparently, he did receive albumin as per the protocol.      He reports that last night after he went to bed, he tried to get up at some point to use a urinal.  While he was sitting at the edge of the bed, he felt very weak and he slid off the edge of the bed to  the ground.  He denies loss of consciousness and he denies hitting his head.  He was not able to get up by himself, so he called 911.  As per report, apparently patient called 911- 6 times in the last month because of recurrent falls.  The patient reports that he started having a dry cough yesterday.  On one occasion with a coughing spell, he vomited once.  He is not aware of having any fever at home, but he did feel cold.  Upon further questioning, he denies any headache, no chest pain.  He denies feeling short of breath.  He denies abdominal pain, no diarrhea, no constipation.  He reports having some stool incontinence.  He also told ER attending that he noticed some blood coming out of his penis.  He denies dysuria.  He does have some chronic lower extremity swelling, which he thinks is stable.  He reported being compliant with his medications, including diuretics.      In the ER, he was seen by Dr. Mercado.  His initial vitals showed a blood pressure of 126/101.  Later on blood pressure was 107/52, heart rate 72, respiratory rate 20, oxygen saturation 93% on room air, T-max in .4.      He did have basic blood work which showed a BMP with creatinine 1.199, BUN was 45, sodium 138, potassium of 5, chloride 105, bicarbonate 26, calcium of 8.7, anion gap of 7, albumin 2.5, total protein 6.9, total bilirubin 1.1, alkaline phosphatase 93, ALT 11, AST 25.  Lactic acid was 2.  Procalcitonin 0.1.  Glucose was 100.  White blood cells 10.2, hemoglobin 10, hematocrit 30.6, platelet count 166.  INR was 1.6.  His UA showed red blood cells of 157.  White blood cells of 9, small leukocyte esterase.  The urine culture and blood cultures are pending at this time.  Ethanol level was 0.02.  His chest x-ray showed bilateral pleural effusions and mild right basilar atelectasis or pneumonia.  He had a CT of the head without contrast which did not show any acute abnormality.  CT of the C-spine was also done.  It did not show any  acute traumatic abnormality.  He also had a CT of the abdomen and pelvis without contrast which showed bilateral pleural effusions and associated atelectasis with patchy infiltrates in the right middle lobe and lingula.  It might be pneumonia.  There is evidence of a cirrhotic liver and moderate amount of ascites.  There is colonic diverticula without diverticulitis.  No bowel obstruction or inflammation.  There are a few mildly enlarged retroperitoneal lymph nodes of uncertain significance.  His EKG showed atrial fibrillation with junctional pacemaker, unchanged compared with prior EKG.      In the ER, he received 1 dose of ceftriaxone and 1 dose of doxycycline for presumed community-acquired pneumonia, and Hospitalist Service was called regarding the admission.      PAST MEDICAL HISTORY:   1.  Alcoholic liver cirrhosis with portal hypertension.   2.  Recurrent ascites, status post multiple paracenteses in the past.  Last paracentesis was done yesterday, 06/10/2019, when 5400 mL of fluid removed.   3.  Alcohol abuse/dependence.   4.  Hypertension.   5.  Dyslipidemia.   6.  History of coronary artery disease, status post coronary artery bypass grafting in 2007 with SVG to LAD, SVG to diagonal, and SVG to OM.   7.  History of ischemic cardiomyopathy with ejection fraction 40% to 45%.   8.  Valvular disease with severe aortic stenosis, moderate mitral regurgitation, moderate to severe tricuspid regurgitation.   9.  Paroxysmal atrial fibrillation on chronic anticoagulation with Xarelto.   10.  History of antiphospholipid antibody syndrome with history of DVT and PE on chronic anticoagulation.  He is status post IVC filter.   11.  Chronic kidney disease, stage III.  His most recent creatinine baseline between 1.7 and 1.9.   12.  Gastroesophageal reflux disease.   13.  History of prostate cancer, status post radioactive seed implants.   14.  History of a low-grade B-cell lymphoproliferative disorder.   15.  Peripheral  neuropathy.   16.  Status post left BKA.   17.  History of traumatic left leg hematoma.   18.  History of MSSA bacteremia.      PAST SURGICAL HISTORY:  *  Past Surgical History:   Procedure Laterality Date     AMPUTATE LEG BELOW KNEE Left 04/2014    related to gangrene, started as foot ulcer related to neuropathy     AMPUTATE LEG BELOW KNEE Left 12/4/2017    Procedure: AMPUTATE LEG BELOW KNEE;  Exploration of Left Leg Below Knee Amputation Stump with Ligation of Bleeders.;  Surgeon: Leandro Arreola MD;  Location:  OR     APPENDECTOMY       APPLY WOUND VAC Left 12/6/2017    Procedure: APPLY WOUND VAC;;  Surgeon: Saima Howard MD;  Location:  SD     ARTHROPLASTY HIP Right 11/27/2018    Procedure: RIGHT TOTAL HIP ARTHROPLASTY;  Surgeon: Saima Howard MD;  Location:  OR     ARTHROPLASTY KNEE Right 9/19/2014    Procedure: ARTHROPLASTY KNEE;  Surgeon: Saima Howard MD;  Location:  OR     CARDIAC SURGERY      CABG     CHOLECYSTECTOMY       COLONOSCOPY       COMBINED CYSTOSCOPY, RETROGRADES, EXCHANGE STENT URETER(S) Left 7/24/2018    Procedure: COMBINED CYSTOSCOPY, RETROGRADES, EXCHANGE STENT URETER(S);  CYSTOSCOPY, BILATERAL RETROGRADES, BILATERAL URETERAL STENT EXCHANGE ;  Surgeon: Matt Cervantes MD;  Location:  OR     CRANIOTOMY Right 4/7/2018    Procedure: CRANIOTOMY;  Right Craniotomy For Subdural Hematoma;  Surgeon: Jono Issa MD;  Location:  OR     CYSTOSCOPY, DILATE URETHRA, COMBINED N/A 10/12/2016    Procedure: COMBINED CYSTOSCOPY, DILATE URETHRA;  Surgeon: Dimitry Bello MD;  Location:  OR     CYSTOSCOPY, REMOVE STENT(S), COMBINED Right 7/24/2018    Procedure: COMBINED CYSTOSCOPY, REMOVE STENT(S);;  Surgeon: Jose Hunter MD;  Location:  OR     ENDOSCOPIC STRIPPING VEIN(S)       esophagogastroduodenoscopy       ESOPHAGOSCOPY, GASTROSCOPY, DUODENOSCOPY (EGD), COMBINED N/A 3/11/2019    Procedure: COMBINED ESOPHAGOSCOPY, GASTROSCOPY, DUODENOSCOPY  (EGD), BIOPSY SINGLE OR MULTIPLE;  Surgeon: Katherin Boyd MD;  Location:  GI     EYE SURGERY      cataracts     GENITOURINARY SURGERY      Prostate Seen Implants     HERNIA REPAIR      Umbilical     INCISION AND DRAINAGE LOWER EXTREMITY, COMBINED Left 12/1/2017    Procedure: COMBINED INCISION AND DRAINAGE LOWER EXTREMITY;  INCISION AND DRAINAGE OF LEFT BELOW KNEE AMPUTATION ABSCESS, WOUND VAC PLACEMENT;  Surgeon: Saima Howard MD;  Location:  OR     IR IVC FILTER PLACEMENT       IRRIGATION AND DEBRIDEMENT LOWER EXTREMITY, COMBINED Left 12/6/2017    Procedure: COMBINED IRRIGATION AND DEBRIDEMENT LOWER EXTREMITY;  IRRIGATION AND DEBRIDEMENT OF LEFT BELOW KNEE AMPUTATION SITE WITH WOUND VAC REPLACEMENT;  Surgeon: Saima Howard MD;  Location:  [facility]     IRRIGATION AND DEBRIDEMENT LOWER EXTREMITY, COMBINED Left 12/8/2017    Procedure: COMBINED IRRIGATION AND DEBRIDEMENT LOWER EXTREMITY;  IRRIGATION AND DEBRIDEMENT OF LEFT BELOW THE KNEE AMPUTATION AND SHORTENING OF THE TIBIA WITH WOUND CLOSURE;  Surgeon: Saima Howard MD;  Location:  OR     LASER HOLMIUM LITHOTRIPSY URETER(S), INSERT STENT, COMBINED Bilateral 6/28/2018    Procedure: COMBINED CYSTOSCOPY, URETEROSCOPY, LASER HOLMIUM LITHOTRIPSY URETER(S), INSERT STENT;  CYSTOSCOPY, BILATERAL URETEROSCOPY,  HOLMIUM LASER LITHOTRIPSY, BILATERAL STENT PLACEMENT, STONE BASKETING;  Surgeon: Jose Hunter MD;  Location:  OR     LASER HOLMIUM LITHOTRIPSY URETER(S), INSERT STENT, COMBINED Left 8/14/2018    Procedure: COMBINED CYSTOSCOPY, URETEROSCOPY, LASER HOLMIUM LITHOTRIPSY URETER(S), INSERT STENT;  CYSTOSCOPY, LEFT URETEROSCOPY, LEFT LASER HOLMIUM LITHOTRIPSY URETER(S) REMOVAL BILATERAL STENTS;  Surgeon: Jose Hunter MD;  Location:  OR     ORTHOPEDIC SURGERY      (R) knee scope     ORTHOPEDIC SURGERY      total hip      ORTHOPEDIC SURGERY      elbow surgery        SOCIAL HISTORY:  The patient lives at home with his wife.  As  "mentioned above, he uses a walker and a wheelchair for ambulation.  He denies smoking.  He continues to drink.  He reports drinking 4-5 ounces of vodka daily.  He is not interested in quitting drinking.  He denies illicit drug abuse.      FAMILY HISTORY:    Family History     Problem (# of Occurrences) Relation (Name,Age of Onset)    Heart Failure (1) Father:  age 87    Lung Cancer (1) Mother           PRIOR TO ADMISSION MEDICATIONS:  Needs to be verified by the Pharmacy     No current facility-administered medications on file prior to encounter.   Current Outpatient Medications on File Prior to Encounter:  carvedilol (COREG) 3.125 MG tablet TAKE 1 TABLET BY MOUTH TWICE DAILY WITH MEALS   gabapentin (NEURONTIN) 600 MG tablet Take 1 tablet (600 mg) by mouth 2 times daily   HYDROcodone-acetaminophen (NORCO) 5-325 MG tablet Take 1 tablet by mouth every 6 hours as needed for pain (for left arm pain/LUE)   melatonin 3 MG tablet Take 1 tablet (3 mg) by mouth nightly as needed for sleep   rivaroxaban ANTICOAGULANT (XARELTO) 20 MG TABS tablet Take 20 mg by mouth daily (with dinner)   senna-docusate (SENOKOT-S/PERICOLACE) 8.6-50 MG tablet Take 1 tablet by mouth daily as needed Hold for loose stools.   spironolactone (ALDACTONE) 25 MG tablet TAKE 1/2 TABLET BY MOUTH DAILY   torsemide (DEMADEX) 20 MG tablet Take 1 tablet (20 mg) by mouth 2 times daily   vitamin B1 (THIAMINE) 100 MG tablet Take 1 tablet (100 mg) by mouth daily      ALLERGIES:   Allergies   Allergen Reactions     Ciprofloxacin Itching     Severe itching \"like ants were crawling\"     Hmg-Coa-R Inhibitors Other (See Comments)     Rhabdomyolysis occurred within a couple days        REVIEW OF SYSTEMS:  A 10-point review of systems was conducted, and it was negative, except for pertinent positives mentioned in history of present illness.      PHYSICAL EXAMINATION:   VITAL SIGNS:  Blood pressure is 107/52, heart rate 72, respiratory rate 20, oxygen saturation 93% " to 97% on room air, T-max in .4.   GENERAL:  The patient is awake, alert, no acute distress at the time of my examination.   HEENT:  Head is normocephalic, atraumatic.  Pupils are equally round and reactive to light.  Oral mucosa is mildly dry.   NECK:  Supple.  No cervical lymphadenopathy, no thyromegaly.   CHEST:  There is bilateral air entry.  No wheezing, no rales, no crackles.   CARDIOVASCULAR:  There is an irregularly irregular heart rate with a systolic murmur, 3/6 intensity, precordial area.  No rubs.   ABDOMEN:  Soft, mildly distended.  No rebound, no guarding.  Bowel sounds are present.   EXTREMITIES:  He is status post left BKA.  There is 2+ pitting edema of right lower extremity extending up to his thigh.  He has evidence of anasarca actually.   SKIN:  Intact.  No rash, no cyanosis.   NEUROLOGIC:  The patient is awake, alert, oriented to self, place and time.  There are no focal neurological deficits.   PSYCHIATRIC:  Normal mood, normal affect.   MUSCULOSKELETAL:  He moves all extremities freely.  There are no obvious joint deformities.      LABORATORY DATA:  Reviewed and dictated above.      ASSESSMENT:  Mr. Antonio Ramirez is a 74-year-old gentleman with complex past medical history of hypertension, dyslipidemia, coronary artery disease, status post coronary artery bypass graft, history of alcoholic liver cirrhosis with portal hypertension and ascites, status post multiple paracenteses, history of systolic congestive heart failure, valvular disease, paroxysmal atrial fibrillation on chronic anticoagulation with Xarelto, history of antiphospholipid antibody syndrome with history of DVT and PE, status post inferior vena cava filter in place, history of methicillin-sensitive Staphylococcus aureus bacteremia with history of a left below-knee amputation, and a history of traumatic subdural hematoma, who was brought in for evaluation of generalized weakness, cough and vomiting.      PLAN:   1.   Community-acquired pneumonia versus aspiration pneumonia:  He presented with generalized weakness and coughing, which is nonproductive.  The weakness is not really new.  Apparently, he called EMS 6 times in the last month for recurrent falls.  He did have a fever of 101.4 in ER.  His chest x-ray mentions mild right basilar atelectasis or pneumonia and CT of the abdomen and pelvis also confirmed patchy infiltrates in the right middle lobe and lingula, which might be pneumonia.  He also has bilateral pleural effusions and associated atelectasis.  His procalcitonin is 0.1 and white blood cells 10.2.  He does report having some swallowing problems sometimes, so aspiration pneumonia might be a possibility.  He was given 1 dose of ceftriaxone and 1 dose of doxycycline in the ER.  Plan for now is to switch him to broader spectrum coverage with IV Zosyn.  He is admitted to the medical floor.  Apparently, his oxygen saturations dipped to 85% in the ER, improved after he was put on supplemental oxygen.  We will continue supplemental oxygen at this time.  We will continue supportive management with Robitussin and Tylenol p.r.n.  We will monitor fever curve and white blood cell trend.  We will try to wean him off oxygen.  Given the fact that he had a paracentesis yesterday on 06/10/2019, iatrogenic peritonitis cannot be completely excluded at this time.  I am planning to have another paracentesis at this time to check fluid analysis.  Currently, he is on Zosyn, which should cover for possible intraabdominal infection as well.   2.  Alcoholic liver cirrhosis with recurrent ascites and portal hypertension.   3.  Anasarca.   4.  Alcohol abuse/dependence:    He continues to drink despite his advanced liver disease.  He had multiple paracenteses in the past.  Last paracentesis was done yesterday on 06/10/2019 with 5400 mL of fluid removed.  Now he presents with fever and generalized weakness, possibly related to pneumonia, but  bacterial peritonitis needs to be ruled out.  A repeat paracentesis with fluid analysis ordered at this time.  He received 1 dose of ceftriaxone, 1 dose of doxycycline in ER.  Plan is to switch him on Zosyn.  We will monitor fever curve, white blood cell trend.  We will monitor cultures and adjust the antibiotics as indicated.  He reports being compliant with his torsemide and spironolactone.  His most recent EGD done in 02/2019 did not show esophageal varices, just portal hypertensive gastropathy.  A GI consult was requested regarding further recommendations about his diuretic therapy, given the fact that he continues to have recurrent ascites despite being compliant with torsemide and Aldactone.  He may need further increasing of his diuretics, although this might be limited by his chronic kidney disease.  We will continue his prior to admission torsemide 20 mg p.o. twice daily and Aldactone 25 mg p.o. daily.   5.  History of antiphospholipid antibody syndrome:  On chronic anticoagulation with Xarelto.   6.  History of deep venous thrombosis and pulmonary embolism:  Status post inferior vena cava filter in place.  We will hold his prior to admission Xarelto for now, given the need for paracentesis.  We will resume Xarelto after paracentesis.   7.  Chronic systolic congestive heart failure with ejection fraction 40% to 45%.   8.  Severe aortic stenosis, moderate mitral regurgitation, moderate to severe tricuspid regurgitation.   9.  Coronary artery disease with history of 3-vessel coronary artery bypass grafting.   10.  Thoracic aortic aneurysm without rupture.   11.  Hypertension.   12.  Dyslipidemia.    Prior to admission medications need to be verified by the Pharmacy, but apparently he is on Coreg 3.125 mg p.o. twice daily, which will be continued at this time.  He is not on aspirin, as he is on Xarelto.  Again, the Xarelto is temporary on hold because of planned paracentesis, which will be resumed after the  procedure.  We will also continue his prior to admission Demadex 20 mg p.o. twice daily and Aldactone.   13.  Paroxysmal atrial fibrillation:  On chronic anticoagulation with Xarelto.  Currently, patient is in atrial fibrillation with rate control.  We will continue his prior to admission Coreg at this time.  We will hold his prior to admission Xarelto temporarily until paracentesis with plan to resume anticoagulation after the procedure.   14.  Peripheral neuropathy:  We will resume his prior to admission gabapentin once the dose will be verified by the Pharmacy.   15.  Anasarca:  He does have significant lower extremity pitting edema.  He states that he uses lymphedema wrapping, although not very compliant.  We will order a lymphedema consult at this time.  We will continue his prior to admission diuretics.   16.  Generalized weakness, which seems to be an ongoing problem for him.  He called 911 six times last month because of recurrent falls.  He is status post left below-knee amputation.  He uses a walker or a wheelchair at home.  He lives with his wife.  PT/OT evaluation has been requested, as well as .  The patient may need to be discharged to TCU.   17.  Alcohol abuse/dependence:  He reports drinking 4-5 ounces of vodka daily.  Apparently, he did not have withdrawal symptoms in the past during his hospitalizations.  We will monitor for any withdrawal symptoms.  I did not order CIWA protocol at this time.  He was seen by Psychiatry during his prior hospitalization, both at that time and now at the time of my examination.  The patient reported that he is not interested in quitting drinking, despite his advanced liver disease.   18.  Chronic kidney disease, stage III:  His most recent creatinine level was between 1.7 and 1.9.  Currently, his creatinine is 1.99.  We will closely monitor his creatinine level with diuresis.  We will avoid nephrotoxic drugs.   19.  Deep venous thrombosis prophylaxis:   Xarelto.      CODE STATUS:  Discussed with the patient and patient wishes to be FULL CODE.      DISPOSITION:  Admit inpatient.  Anticipate at least 2 days of hospitalization.         RAFAEL ORTEGA MD             D: 2019   T: 2019   MT: [id number]      Name:     GALILEA LUGO   MRN:      [id number]        Account:      [id number]   :      1945        Admitted:     2019                   Document: [id number]       cc: Main Hardy MD

## 2019-06-11 NOTE — ED PROVIDER NOTES
History     Chief Complaint:  Nausea & Vomiting    HPI   Antonio Ramirez is a 74 year old male with a complex medical history including CAD, CHF, CKD, PE, DVT, atrial fibrillation on Xarelto, subdural hematoma and prostate cancer who presents with nausea and vomiting. The patient reports that around 2300 tonight he developed nausea and vomiting, as well as a dry cough. Shortly prior to arrival to the ED tonight the patient states that he was trying to get out of bed when he slid to the ground. The patient denies hitting his head or any loss of consciousness. He was unable to get up from the ground on his own and EMS was subsequently called. EMS reports that this is the patient's 6th fall in the past month requiring EMS services. They add that the patient appears weaker than normal tonight as well. Currently in the ED the patient is noted to have redness to his right lower extremity; he states this is not new for him. The patient is additionally noted to have blood in his diaper and coming from his penis tonight as well and he states he is already scheduled to see urology regarding this. The patient otherwise denies any shortness of breath, chest pain, abdominal pain, diarrhea, sore throat, headache, neck pain or any unilateral weakness.     Allergies:  Ciprofloxacin  Hmg-Coa-R Inhibitors     Medications:    Coreg  Vitamin B-12  Folvite  Neurontin  Norco  Cortaid  Atrovent  Melatonin 3 mg tablet  Thera-Vit  Xarelto  Senokot-S/Pericolace  Aldactone  Demadex  Thiamine    Past Medical History:    Alcoholism  Amputated left leg  Alcohol abuse  Anemia  Anticardiolipin antibody syndrome   Antiphospholipid antibody syndrome  Aortic root dilatation  Aortic stenosis  Arthritis  Balance Problem  CAD  CHF  Stage 3 CKD  Alcoholic cirrhosis of liver  GERD  MI  Hiatal hernia  Hypercholesterolemia  Hypertension  Ischemic cardiomyopathy  Left foot drop  Liver Disease  Low grade B cell lymphoproliferative disorder  Moderate mitral  "regurgitation  Monoclonal gammopathy  MSSA Bacteremia  Neuropathy  Noninfectious ileitis  Nonrheumatic tricuspid valve regurgitation  Paroxysmal atrial fibrillation  PE  Prostate Cancer  Pulmonary Hypertension  Phantom limb pain  Renal disease  Syncope  Subdural hematoma  Thrombocytopenia  Urethral stricture  Ureter calculi  DVT  Physical deconditioning    Past Surgical History:    Amputate left leg below the knee  Appendectomy  Apply wound vac, left  Right Hip Arthroplasty  Right knee arthroplasty  CABG  Cholecystectomy  Colonoscopy  Combined cystoscopy, retrogrades, exchange stent ureter, left  Craniotomy  Cystoscopy, dilate urethra, combined  Cystoscopy, remove stent, combined, right  Endoscopic vein stripping  Esophagogastroduodenoscopy  EGD combined  Eye surgery for cataracts  Prostate Seen Implants  Umbilical hernia repair  Incision and drainage lower extremity, combined, left  IR IVC Filter Placement  Irrigation and debridement lower extremity, combined, left x 2  Laser holmium lithotripsy ureter, insert stent, combined, bilateral  Laser holmium lithotripsy ureter, insert stent, combined, left  Right knee scope  Elbow surgery    Family History:    Lung Cancer  Heart Failure    Social History:  Smoking Status: Never Smoker  Alcohol Use: Yes  Patient presents via EMS.  Marital Status:    The patient lives in a private home with his wife.      Review of Systems   All other systems reviewed and are negative.    Physical Exam     Patient Vitals for the past 24 hrs:   BP Temp Temp src Pulse Heart Rate Resp SpO2 Height   06/11/19 0448 102/52 -- -- -- 71 -- 97 % --   06/11/19 0300 107/52 101.4  F (38.6  C) Oral 78 72 21 93 % --   06/11/19 0149 (!) 126/101 101.4  F (38.6  C) Oral -- 85 -- 93 % 1.88 m (6' 2\")     Physical Exam  General: Resting on the bed.  Head: No obvious trauma to head.  Ears, Nose, Throat:  External ears normal.  Nose normal.  Clear TMs  Eyes:  Conjunctivae clear.  Pupils are equal, round, " and reactive.   Neck: Normal range of motion.  Neck supple.   CV: Regular rate and rhythm.  + murmurs.      Respiratory: Effort normal and breath sounds normal.  No wheezing or crackles.   Gastrointestinal: Soft. + distension. There is no tenderness.  There is no rigidity, no rebound and no guarding.   Neuro: Alert. Moving all extremities appropriately.  Normal speech.  Motor and sensation gross  Skin: Skin is warm and dry.  No rash noted. Mild erythema to the right lower leg without warmth, induration or fluctuance.    : blood noted at the meatus    Emergency Department Course     ECG (3:07:57):  Rate 73 bpm. DE interval * ms. QRS duration 78 ms. QT/QTc 382/420 ms. P-R-T axes * 99 110.   Atrial fibrillation with a competing junctional pacemaker.  Rightward axis.  Cannot rule out Anterior infarct, age undetermined.  Abnormal ECG.  No significant change compared to ECG dated 2/28/19.  Interpreted by Mary Mercado MD.    Imaging:  Radiographic findings were communicated with the patient who voiced understanding of the findings.    XR Chest 2 Views  Bilateral pleural effusions and mild right basilar  atelectasis or pneumonia.  As read by radiology.    Head CT w/o contrast  No acute abnormality.    As read by radiology.    Cervical spine CT w/o contrast  No acute traumatic abnormality.  As read by radiology.    CT Abdomen Pelvis w/o Contrast  1. Bilateral pleural effusions and associated atelectasis.  2. Patchy infiltrates in the right middle lobe and lingula may be  pneumonia.  3. Cirrhotic liver.  4. Moderate amount of ascites.  5. Colonic diverticula without acute diverticulitis. No bowel  obstruction or inflammation. 6. A few mildly enlarged retroperitoneal  lymph nodes of uncertain significance.  As read by radiology.    Laboratory:    0205: Lactic acid whole blood: 2.0    0205: Alcohol blood level: 0.02 (H)    INR: 1.60 (H)    Procalcitonin: 0.10    CMP: Creatinine 1.99 (H), Glucose 100 (H), Urea Nitrogen  45 (H), GFR Estimate 32 (L), Albumin 2.5 (L), o/w AWNL    CBC: HGB 10.0 (L), RBC 3.24 (L), HCT 30.6 (L), RDW 15.7 (H), Absolute Neutrophil 8.8 (H), Absolute Lymphocyte 0.3 (L), o/w AWNL (WBC 10.2, )    Blood Culture x2: Pending    UA: Blood Urine Moderate (A), Protein Albumin Urine 10 (A), Leukocyte Esterase Urine Small (A), WBC Urine 9 (H), RBC Urine 157 (H), Mucous Urine Present (A), Hyaline Casts 7 (H), o/w Negative    Urine Culture Aerobic Bacterial: Pending    Interventions:    0414: Rocephin 2 g IV  0506: Vibramycin 100 mg IV    Emergency Department Course:  Past medical records, nursing notes, and vitals reviewed.  0202: I performed an exam of the patient and obtained history, as documented above.    Blood was drawn.    The patient was sent for an X-ray and CT scans while in the emergency department, findings above.    0358: Discussed the patient with Dr. Richey, who will admit the patient to a monitored bed for further observation, evaluation, and treatment.    0401: Rechecked the patient, findings and plan explained to the patient, who consents to admission.      Impression & Plan      Medical Decision Makin-year-old male with history of alcoholic cirrhosis, alcohol abuse, A. fib, CHF, CKD presents with cough and vomiting.  Vital signs notable for low 89% saturations, febrile.  Broad differential was pursued including but not limited to pneumonia, electrolyte, metabolic or renal dysfunction, severe sepsis or septic shock, UTI, bacteremia, SBP, stroke, ICH, etc.  CBC shows no leukocytosis and chronic mild anemia.  CMP shows acute on chronic kidney dysfunction, but no electrolyte or metabolic abnormalities.  LFTs are reassuring.  Lactic acid is within normal limits, no suggestion of severe sepsis or septic shock.  INR is 1.60, likely secondary to Xarelto and alcohol abuse.  Alcohol level 0.02.  UA is grossly bloody with small leukocyte esterase, no clear suggestion of UTI.  Urine culture is  pending.  CT renal was obtained showing acute surgical process, does show infiltrate noted in the right middle lobe and lingula.  There is no evidence of renal stones, obstruction, acute diverticulitis, etc.  There is noted to be a cirrhotic liver and ascites.  Considered SBP but patient does not appear to have any abdominal tenderness on exam.  Ceftriaxone would empirically cover if there was any evidence of SBP.  The patient had a tap yesterday.  blood cultures are pending.  Head CT shows no evidence of acute intracranial hemorrhage, mass, tumor.  Examination does not have any apparent strokelike symptoms.  CT cervical shows no evidence of acute fracture or dislocation.  Patient is moving his neck freely, do not suspect ligamentous or tendinous injury.  EKG shows A. fib, no acute ST or T wave changes.  No evidence of ischemia.  Patient denies any chest pain or shortness of breath.  Chest X-ray does show a right basilar pneumonia in addition to bilateral pleural effusions.  CT renal does also see a infiltrate in the right middle lobe and lingula.  Given patient's hypoxia, cough, fevers felt that pneumonia was the likely etiology.  IV antibiotics were initiated.  Patient was admitted.  Discussed with hospitalist who graciously accepted.    Diagnosis:    ICD-10-CM    1. Acute kidney injury (H) N17.9 Urine Culture Aerobic Bacterial     Blood culture     Procalcitonin   2. Cough R05    3. Pneumonia of right lower lobe due to infectious organism (H) J18.1    4. Acute febrile illness R50.9    5. Non-intractable vomiting with nausea, unspecified vomiting type R11.2    6. Generalized muscle weakness M62.81        Disposition:  Admitted to hospital.        Bethany Rodriguez  6/11/2019    EMERGENCY DEPARTMENT  Bethany COUCH, am serving as a scribe at 2:02 AM on 6/11/2019 to document services personally performed by Mary Mercado MD based on my observations and the provider's statements to me.        Rogelio  Mary BEAR MD  06/11/19 [id number]       Mary Mercado MD  06/11/19 1515

## 2019-06-11 NOTE — CODE/RAPID RESPONSE
Present in room and observed patient go into what appeared to be Ventricular Tachycardia. Patient had no pulse. Received 2 minutes of CPR with return of spontaneous circulation. Family present in room.     Anuja Hansen MD

## 2019-06-11 NOTE — PROGRESS NOTES
Upon arrival to room, pt was breathing shallow, foaming at the mouth, O2 sats in 60s. Pt unresponsive to name, spitting foam out. Staff called into room.

## 2019-06-11 NOTE — PROGRESS NOTES
Stevenson's test performed prior to right radial ABG draw. Collateral circulation confirmed.   ABG drawn on 90% FiO2 at 0835.

## 2019-06-11 NOTE — PROVIDER NOTIFICATION
DATE:  6/11/2019   TIME OF RECEIPT FROM LAB:  5778  LAB TEST:  Troponin  LAB VALUE:  0.433  RESULTS GIVEN WITH READ-BACK TO (PROVIDER): Dr. Hansen  TIME LAB VALUE REPORTED TO PROVIDER:   1114

## 2019-06-12 ENCOUNTER — TELEPHONE (OUTPATIENT)
Dept: FAMILY MEDICINE | Facility: CLINIC | Age: 74
End: 2019-06-12

## 2019-06-12 ENCOUNTER — APPOINTMENT (OUTPATIENT)
Dept: MRI IMAGING | Facility: CLINIC | Age: 74
DRG: 871 | End: 2019-06-12
Attending: INTERNAL MEDICINE
Payer: MEDICARE

## 2019-06-12 LAB
ALBUMIN SERPL-MCNC: 2.1 G/DL (ref 3.4–5)
ALP SERPL-CCNC: 72 U/L (ref 40–150)
ALT SERPL W P-5'-P-CCNC: 11 U/L (ref 0–70)
ANION GAP SERPL CALCULATED.3IONS-SCNC: 6 MMOL/L (ref 3–14)
AST SERPL W P-5'-P-CCNC: 25 U/L (ref 0–45)
BACTERIA SPEC CULT: NORMAL
BILIRUB SERPL-MCNC: 1.3 MG/DL (ref 0.2–1.3)
BUN SERPL-MCNC: 50 MG/DL (ref 7–30)
CALCIUM SERPL-MCNC: 8.3 MG/DL (ref 8.5–10.1)
CHLORIDE SERPL-SCNC: 109 MMOL/L (ref 94–109)
CO2 SERPL-SCNC: 24 MMOL/L (ref 20–32)
CREAT SERPL-MCNC: 2.53 MG/DL (ref 0.66–1.25)
ERYTHROCYTE [DISTWIDTH] IN BLOOD BY AUTOMATED COUNT: 16.1 % (ref 10–15)
GFR SERPL CREATININE-BSD FRML MDRD: 24 ML/MIN/{1.73_M2}
GLUCOSE SERPL-MCNC: 92 MG/DL (ref 70–99)
HCT VFR BLD AUTO: 30.7 % (ref 40–53)
HGB BLD-MCNC: 10.2 G/DL (ref 13.3–17.7)
INR PPP: 1.68 (ref 0.86–1.14)
Lab: NORMAL
MCH RBC QN AUTO: 31.6 PG (ref 26.5–33)
MCHC RBC AUTO-ENTMCNC: 33.2 G/DL (ref 31.5–36.5)
MCV RBC AUTO: 95 FL (ref 78–100)
PLATELET # BLD AUTO: 115 10E9/L (ref 150–450)
POTASSIUM SERPL-SCNC: 5 MMOL/L (ref 3.4–5.3)
PROT SERPL-MCNC: 6.1 G/DL (ref 6.8–8.8)
RBC # BLD AUTO: 3.23 10E12/L (ref 4.4–5.9)
SODIUM SERPL-SCNC: 139 MMOL/L (ref 133–144)
SPECIMEN SOURCE: NORMAL
WBC # BLD AUTO: 10.7 10E9/L (ref 4–11)

## 2019-06-12 PROCEDURE — 70551 MRI BRAIN STEM W/O DYE: CPT

## 2019-06-12 PROCEDURE — 20000003 ZZH R&B ICU

## 2019-06-12 PROCEDURE — 36415 COLL VENOUS BLD VENIPUNCTURE: CPT | Performed by: INTERNAL MEDICINE

## 2019-06-12 PROCEDURE — 80053 COMPREHEN METABOLIC PANEL: CPT | Performed by: INTERNAL MEDICINE

## 2019-06-12 PROCEDURE — 25000128 H RX IP 250 OP 636: Performed by: INTERNAL MEDICINE

## 2019-06-12 PROCEDURE — 99233 SBSQ HOSP IP/OBS HIGH 50: CPT | Performed by: INTERNAL MEDICINE

## 2019-06-12 PROCEDURE — 25800030 ZZH RX IP 258 OP 636: Performed by: INTERNAL MEDICINE

## 2019-06-12 PROCEDURE — 25000125 ZZHC RX 250: Performed by: INTERNAL MEDICINE

## 2019-06-12 PROCEDURE — 85610 PROTHROMBIN TIME: CPT | Performed by: INTERNAL MEDICINE

## 2019-06-12 PROCEDURE — 85027 COMPLETE CBC AUTOMATED: CPT | Performed by: INTERNAL MEDICINE

## 2019-06-12 RX ADMIN — PIPERACILLIN SODIUM,TAZOBACTAM SODIUM 3.38 G: 3; .375 INJECTION, POWDER, FOR SOLUTION INTRAVENOUS at 05:50

## 2019-06-12 RX ADMIN — FENTANYL CITRATE 25 MCG: 50 INJECTION INTRAMUSCULAR; INTRAVENOUS at 05:51

## 2019-06-12 RX ADMIN — VANCOMYCIN HYDROCHLORIDE 2000 MG: 5 INJECTION, POWDER, LYOPHILIZED, FOR SOLUTION INTRAVENOUS at 09:20

## 2019-06-12 RX ADMIN — PIPERACILLIN SODIUM,TAZOBACTAM SODIUM 3.38 G: 3; .375 INJECTION, POWDER, FOR SOLUTION INTRAVENOUS at 18:03

## 2019-06-12 RX ADMIN — PIPERACILLIN SODIUM,TAZOBACTAM SODIUM 3.38 G: 3; .375 INJECTION, POWDER, FOR SOLUTION INTRAVENOUS at 11:59

## 2019-06-12 RX ADMIN — GLYCOPYRROLATE 0.2 MG: 0.2 INJECTION, SOLUTION INTRAMUSCULAR; INTRAVENOUS at 14:07

## 2019-06-12 RX ADMIN — FENTANYL CITRATE 25 MCG: 50 INJECTION INTRAMUSCULAR; INTRAVENOUS at 02:34

## 2019-06-12 RX ADMIN — FENTANYL CITRATE 25 MCG: 50 INJECTION INTRAMUSCULAR; INTRAVENOUS at 20:33

## 2019-06-12 ASSESSMENT — ACTIVITIES OF DAILY LIVING (ADL)
ADLS_ACUITY_SCORE: 27

## 2019-06-12 NOTE — PROGRESS NOTES
[facility]  Patient is currently open to home care services with [facility]. The patient is currently receiving Skilled Physical Therapy services. [facility]  and team have been notified of patient admission. [facility] liaison will continue to follow patient during stay. If appropriate provide orders to resume home care at time of discharge.

## 2019-06-12 NOTE — PLAN OF CARE
OT and PT/lymph: Spoke with nursing, pt with minimal to no command following and not appropriate for therapy today.

## 2019-06-12 NOTE — TELEPHONE ENCOUNTER
Reason for Call:  Other     Detailed comments: Dr. Hansen calling to discuss Antonio.  He is in the ICU.  Dr hedrick can call Dr. Hansen a the ICU    Phone Number Patient can be reached at: [phone]    Best Time: any    Can we leave a detailed message on this number?     Call taken on 6/12/2019 at 12:31 PM by Lisa Neri

## 2019-06-12 NOTE — PLAN OF CARE
Wife at bedside. Pt moans et opens eyes to pain only. Prn fentanyl appears to help this. No follow. Single episode of mvmt of R lower arm with repositioning at 0600. Otherwise minimal response. Am abg, labs, cxr deferred at this time. Will inquire with wife when she awakens if desires done based on prior conversation in evening regarding O2 application. Cont supportive care.

## 2019-06-12 NOTE — PHARMACY-VANCOMYCIN DOSING SERVICE
Pharmacy Vancomycin Initial Note  Date of Service 2019  Patient's  1945  74 year old, male    Indication: Bacteremia    Current estimated CrCl = Estimated Creatinine Clearance: 34.9 mL/min (A) (based on SCr of 2.27 mg/dL (H)).    Creatinine for last 3 days  2019:  2:05 AM Creatinine 1.99 mg/dL;  7:10 AM Creatinine 2.05 mg/dL; 10:29 AM Creatinine 2.17 mg/dL;  4:38 PM Creatinine 2.27 mg/dL    Recent Vancomycin Level(s) for last 3 days  No results found for requested labs within last 72 hours.      Vancomycin IV Administrations (past 72 hours)      No vancomycin orders with administrations in past 72 hours.                Nephrotoxins and other renal medications (From now, onward)    Start     Dose/Rate Route Frequency Ordered Stop    19 0845  vancomycin (VANCOCIN) 2,000 mg in sodium chloride 0.9 % 500 mL intermittent infusion      2,000 mg  over 2 Hours Intravenous ONCE 19 0831      19 0831  vancomycin place farmer - receiving intermittent dosing      1 each Does not apply SEE ADMIN INSTRUCTIONS 19 0831      19 0600  piperacillin-tazobactam (ZOSYN) 3.375 g vial to attach to  mL bag     Note to Pharmacy:  Pharmacy can adjust dose based on renal function.    3.375 g  over 30 Minutes Intravenous EVERY 6 HOURS 19 0530            Contrast Orders - past 72 hours (72h ago, onward)    Start     Dose/Rate Route Frequency Ordered Stop    19 1445  perflutren diluted 1mL to 2mL with saline (OPTISON) diluted injection 3 mL     Note to Pharmacy:  NDC 9927-3021-28    3 mL Intravenous ONCE 19 1441 19 1442                Plan:  1.  Start vancomycin  2000 mg IV x1  2.  Goal Trough Level: 15-20 mg/L until MRSA ruled out  3.  Pharmacy will check trough levels as appropriate in 1-3 Days.  Tomorrow  24 hour level  4. Serum creatinine levels will be ordered daily for the first week of therapy and at least twice weekly for subsequent weeks.   Especially being  pt is also on zosyn and renal function already appears to be declining.   5. Hancock method utilized to dose vancomycin therapy: Method 2    Veronika SpicerD

## 2019-06-12 NOTE — PROGRESS NOTES
ADAN    I:  SW will close consult for: Discharge planning, as patient has transitioned to comfort cares.     P: No further SW interventions anticipated at this time. Will be available if re consulted for Hospice, should this be indicated/requested by family.     TREMAYNE Auguste

## 2019-06-12 NOTE — PLAN OF CARE
"Pt. Remains in room air this 12H shift, no desats.,frequent suctioning of throat with thick secretions, vitals stable with low but improving urine output,coughing at times, moaning noted with pt. Turns and exam only, pt's neuro exam improved somewhat over shift, with hand squeezing first with right hand and then later in shift with both hands, he was able to nod \"no\" when asked if he were in pain, otherwise he could not follow with his eyes nor vocalize understandable words (more moaning and possibly trying to talk when talked to), he also wiggled his toes to demand X1 but could not lift extremities purposefully, pt's wife at bedside and was supportive, brain MRI obtained and results discussed by Dr. Brooks (ICU) with pt's wife, family is aware he may transfer soon out of ICU and are receptive to that.  "

## 2019-06-12 NOTE — PROGRESS NOTES
[facility]  Comprehensive Daily ICU Note        Antonio Ramirez MRN# [id number]   Age: 74 year old YOB: 1945     Date of Admission: 6/11/2019    Primary care provider: [facility]     CODE STATUS: FULL      Problem List:   Cardiac arrest x 2  Encephalopathy, likely from anoxia  Shock - resolved - septic vs. cardiogenic  Thrombocytopenia and anemia  Pneumonia  Alcoholic cirrhosis  ANSELMO  Hypoxic encephalopathy        Subjective/ Last 24 hours:   Mr. Ramirez is a 74 year old man with multiple multiple medical problems, most significantly in the acute sense:  - alcoholic liver disease with ongoing alcohol use and no interest in quitting  - general decline in his overall health in the last few months with multiple falls and severely impaired mobility  - Subdural hematoma in 2018 causing significant impairment in cognition. Estimated percentile of cognitive function 9% on assessment in rehab. Family feels this assessment greatly underestimated his overall level of functioning.   - multiple infections.   He was admitted to the hospital on 6/10 for weakness, cough, hypoxia and vomiting.  Wife reports that he had been more weak in the last few weeks. Also reports that late yesterday evening he was complaining of being cold and developed significant chills.  Had a paracentesis day before admission.     Was presumptively diagnosed with pneumonia and admitted to the general medical connolly. Early AM some time in the space of 30 minutes became unresponsive and developed PEA. Received 5 minutes of CPR with ROSC. Transferred to ICU where he has remained unresponsive other than flexor posturing to painful stimuli. Had extensive conversation with family. Discussed his acute condition as well as his extensive medical history and elected to proceed with support for a few days. Shortly thereafter while I was in the room patient went into non perfusing rhythm that appeared to be VTach. Received about 2  minutes of CPR with return of spontaneous circulation. Given this event the family elected to transition to comfort care.  Now extubated for 24 hours. Vital signs are stable. Patient has been having some purposeful movements in the upper extremity but no interaction.          Mechanical Ventilation/Vitalsigns/IsandOs:   Temp:  [98.4  F (36.9  C)-99.3  F (37.4  C)] 98.6  F (37  C)  Pulse:  [52-75] 63  Heart Rate:  [53-81] 64  Resp:  [9-27] 9  BP: ()/(42-81) 111/56  SpO2:  [84 %-96 %] 93 %      Intake/Output Summary (Last 24 hours) at 6/12/2019 1132  Last data filed at 6/12/2019 1100  Gross per 24 hour   Intake 444.68 ml   Output 720 ml   Net -275.32 ml   Net: +1.7 liters         Physical Examination:     General: Stated age, comfortable  HEENT: HERBERT, eyes closed, no opening to voice  Lungs: LCTAB anteriorly  CVS: RRR, loud systolic murmur  Abdomen: +BS, soft, non distended  Extremities/musculoskeletal: left amputated leg, anasarca, warm  Neurology: moans occasionally to verbal stimuli, no purposeful movement on my examination.   Skin: pressure dressing over the sacrum, approximately 4cm long area of redness over the middle spine. 4  Psychiatry: unable  Exam of Line sites:  No lines     Hospital Procedures     CXR  CT scans   MRI  CPR  Intubation and mechanical ventilation         Feeding/Glucose:   138    Blood glucose/Insulin requirement last 24 hours: none         Medications:       folic acid  1 mg Oral Daily     multivitamin, therapeutic  1 tablet Oral Daily     piperacillin-tazobactam  3.375 g Intravenous Q6H     sodium chloride (PF)  3 mL Intracatheter Q8H     vancomycin place farmer - receiving intermittent dosing  1 each Does not apply See Admin Instructions        - MEDICATION INSTRUCTIONS -       sodium chloride 10 mL/hr at 06/12/19 0751              Labs/Diagnostic studies:     EKG:  sinus    Echo:  EF of 35-40%, LVH, multiple areas of hypokinesis, RV overload, RV mildly to moderately reduced, LA and  RA dilated, severe, aortic stenosis, moderate mitral regurgitation, moderate to severe tricuspid regurgitation and severe pulmonary hypertension in March of 2019    ROUTINE ICU LABS (Last four results)  CMP  Recent Labs   Lab 06/12/19  1057 06/11/19  1638 06/11/19  1030 06/11/19  1029 06/11/19  0710    137 137 138 137   POTASSIUM 5.0 5.2 5.2 5.2 5.0   CHLORIDE 109 107  --  107 105   CO2 24 26  --  27 24   ANIONGAP 6 4  --  4 8   GLC 92 111*  --  107* 138*   BUN 50* 48*  --  46* 46*   CR 2.53* 2.27*  --  2.17* 2.05*   GFRESTIMATED 24* 27*  --  28* 31*   GFRESTBLACK 27* 31*  --  33* 35*   BENNIE 8.3* 8.2*  --  8.1* 8.2*   MAG  --   --   --   --  2.5*   PROTTOTAL 6.1* 6.2*  --  6.5* 6.7*   ALBUMIN 2.1* 2.4*  --  2.4* 2.5*   BILITOTAL 1.3 1.2  --  1.2 0.9   ALKPHOS 72 82  --  83 88   AST 25 26  --  28 27   ALT 11 12  --  12 11     CBC  Recent Labs   Lab 06/12/19  1057 06/11/19  1030 06/11/19  1029 06/11/19  0710 06/11/19  0205   WBC 10.7  --  21.3* 9.7 10.2   RBC 3.23*  --  3.40* 3.41* 3.24*   HGB 10.2* 10.9* 10.7* 10.7* 10.0*   HCT 30.7*  --  32.1* 32.5* 30.6*   MCV 95  --  94 95 94   MCH 31.6  --  31.5 31.4 30.9   MCHC 33.2  --  33.3 32.9 32.7   RDW 16.1*  --  15.6* 15.8* 15.7*   *  --  160 159 166     INR  Recent Labs   Lab 06/12/19  1057 06/11/19  1029 06/11/19  0710 06/11/19  0205   INR 1.68* 1.70* 1.65* 1.60*     Arterial Blood Gas  Recent Labs   Lab 06/11/19  1638 06/11/19  0835   PH  --  7.39   PCO2  --  40   PO2  --  310*   HCO3  --  24   O2PER None Ventilator       Other Lab Data:  Lactic acid: 4.3 and now normal    Cultures:  Recent Labs   Lab 06/11/19  0955 06/11/19  0303 06/11/19  0220   CULT Light growth  Normal aranza to date   No growth after 21 hours Cultured on the 1st day of incubation:  Staphylococcus simulans  *  Critical Value/Significant Value, preliminary result only, called to and read back by   Zari Camacho RN. 6/11/19 @ 8648, JJR    Susceptibility testing in progress   (Note)  POSITIVE for Staphylococci other than S.aureus, S.epidermidis and  S.lugdunensis, by Verigene multiplex nucleic acid test.  Coagulase-negative staphylococci are the most common venipuncture or  collection associated skin CONTAMINANTS grown in blood cultures.  Final identification and antimicrobial susceptibility testing will be  verified by standard methods.    Specimen tested with Verigene multiplex, gram-positive blood culture  nucleic acid test for the following targets: Staph aureus, Staph  epidermidis, Staph lugdunensis, other Staph species, Enterococcus  faecalis, Enterococcus faecium, Streptococcus species, S. agalactiae,  S. anginosus grp., S. pneumoniae, S. pyogenes, Listeria sp., mecA  (methicillin resistance) and Rad/B (vancomycin resistance).    Critical Value/Significant Value called to and read back by  Mariana Goddard RN on 06.11.2019 at 2359.  JRT    >100,000 colonies/mL  mixed urogenital aranza  Susceptibility testing not routinely done       Blood culture:  Invalid input(s): BC   Urine culture:  No results for input(s): URC in the last 168 hours.            Imaging:     CXR:  Bilateral pulmonary infiltrates, right more than left and very significant    CT:  Cirrhosis and pulmonary infiltrates         Assessment and Plan and more detailed hospital course:     Summary:  Antonio Ramirez is a 74 year old male admitted on 6/11/2019 for pneumonia. He received antibiotics for this but had 2x cardiac arrest. This combined with poor premorbid condition resulted in a decision to transition to comfort care. More details on hospital course are below.     Neurology and Psychiatry:  Baseline impaired cognitive function now with likely anoxic brain injury based on documented time without pulse as well as examination. Degree of anticipated impairment unclear but has prior brain injury that would make him more fragile. . Could not rule out seizure as a cause of his acute loss of consciousness, but no  epileptiform activity was seen on EEG. EEG did show severe encephalopathy on 6/11. Is now exhibiting some purposeful movement but is still not interactive.   - Hold sedatives.   - MRI brain  - Judicious use of pain medications if needed    Pulmonary:   Pneumonia with hypoxic respiratory failure and now continued need for ventilator due to encephalopathy.  Patient 74 inches tall so was placed on tidal volume appropriate for his height. ABG showed normal ventilation and good oxygenation. Patient was extubated to room air in anticipation of death. Is now stable on room air with oxygen saturations in the 90s.    Cardiovascular system:   Baseline cardiomyopathy as well as multiple valve abnormalities. Transferred to ICU after cardiac arrest initially presumed to be secondary to respiratory arrest. In shock, presumed septic and placed on vasopressors, although TTE was not done.  In ICU had another spontaneous cardiac arrest making likelihood of primary cardiac arrest as cause of his event upstairs more likely.  Discussed CPR with family as well as my inability to identify anything reversible that would prevent future cardiac arrest and family elected to transition to comfort care. No further arrhythmias noted  - Continue DNR status    Renal/Electrolytes:  Oliguric ANSELMO. Related to infection on top of baseline CKD. UOP adequate but Creatinine continuing to worsen.  - Continue IVF. Increase rate from TKO.    ID:  Likely pneumonia based on his symptoms, vital signs and XRAY, as well as his predisposition to infection. No growth on cultures so far. WBC down today, suggesting adequate antimicrobial coverage. Placed on Vancomycin for GPC in blood cultures after WBC has come down. Strongly suggests blood culture growth was a contaminant.  - Continue antibiotics.     GI/:  Alcoholic cirrhosis. Meld in 20s. Requiring frequent paracentesis and received one prior to admission.     Nutrition:   No  issues    Musculoskeletal/Rheumatology/skin:  Baseline weakness and neuropathy causing gait instability with multiple falls and extremely impaired mobility.  Also pressure ulcer present on admission.  - PT if he is able to participate.  - Wound care and dressing changes per protocol    Endocrine:   No issues    Heme/Onc:  Fixed coagulopathy from alcoholic liver disease as well as macrocytic anemia, probably from same and chronic disease. No change.     Prognosis/Care Plan: Are continuing antibiotics and IVF while we wait to see how much recovery patient has from a neurologic standpoint. Will not escalate cares if patient develops another cardiac arrest or worsening respiratory failure.    Anuja Hansen MD  [phone]

## 2019-06-12 NOTE — PROGRESS NOTES
"Pt having short periods of apnea/shallow respers with sats in the mid 80's, as low as 79%. Discussed with wife. Asked her if she wanted writer to place pt on oxygen. After education about low O2 sats et possible reasons, she stated to writer, \"we know his body is dying and want it to be natural.\" Emotional suppport given. Wife staying at bedside in lounge chair.   "

## 2019-06-12 NOTE — PROGRESS NOTES
"SPIRITUAL HEALTH SERVICES  Spiritual Assessment Progress Note  [facility] ICU   [facility]  Wife described herself as \" feeling flat - stuffing her emotions\". She wonders whether pt will come through this or not.  She fears that he will survive and be extremely disabled which will make her life and his life miserable.  She appreciated the tests and the opinions of staff to guide her as she makes decisions. She hopes time will make the decision more clear.     Wife stated that she has good support of friends and family to help her through this.      listened and provided education regarding end of life decision making.     SH will continue to follow.                                                                                                                                             Meenu Mora M.Div., [facility]  Staff    Pager [phone]  "

## 2019-06-12 NOTE — PROVIDER NOTIFICATION
MD Notification    Notified Person: MD     Notified Person Name:  Dr Hill  Notification Date/Time: 6/11 2125    Notification Interaction:verbal    Purpose of Notification: +blood cx, gram +cocci in clusters fm R arm drawn 6/11    Orders Received: non    Comments:pt on zosyn

## 2019-06-13 ENCOUNTER — APPOINTMENT (OUTPATIENT)
Dept: SPEECH THERAPY | Facility: CLINIC | Age: 74
DRG: 871 | End: 2019-06-13
Attending: PHYSICIAN ASSISTANT
Payer: MEDICARE

## 2019-06-13 ENCOUNTER — APPOINTMENT (OUTPATIENT)
Dept: OCCUPATIONAL THERAPY | Facility: CLINIC | Age: 74
DRG: 871 | End: 2019-06-13
Attending: INTERNAL MEDICINE
Payer: MEDICARE

## 2019-06-13 ENCOUNTER — APPOINTMENT (OUTPATIENT)
Dept: PHYSICAL THERAPY | Facility: CLINIC | Age: 74
DRG: 871 | End: 2019-06-13
Attending: INTERNAL MEDICINE
Payer: MEDICARE

## 2019-06-13 LAB
ALBUMIN SERPL-MCNC: 2.1 G/DL (ref 3.4–5)
ALP SERPL-CCNC: 62 U/L (ref 40–150)
ALT SERPL W P-5'-P-CCNC: 11 U/L (ref 0–70)
ANION GAP SERPL CALCULATED.3IONS-SCNC: 6 MMOL/L (ref 3–14)
AST SERPL W P-5'-P-CCNC: 22 U/L (ref 0–45)
BACTERIA SPEC CULT: ABNORMAL
BACTERIA SPEC CULT: NORMAL
BILIRUB SERPL-MCNC: 1.1 MG/DL (ref 0.2–1.3)
BUN SERPL-MCNC: 49 MG/DL (ref 7–30)
CALCIUM SERPL-MCNC: 8.4 MG/DL (ref 8.5–10.1)
CHLORIDE SERPL-SCNC: 111 MMOL/L (ref 94–109)
CO2 SERPL-SCNC: 26 MMOL/L (ref 20–32)
CREAT SERPL-MCNC: 2.57 MG/DL (ref 0.66–1.25)
ERYTHROCYTE [DISTWIDTH] IN BLOOD BY AUTOMATED COUNT: 16 % (ref 10–15)
GFR SERPL CREATININE-BSD FRML MDRD: 23 ML/MIN/{1.73_M2}
GLUCOSE BLDC GLUCOMTR-MCNC: 116 MG/DL (ref 70–99)
GLUCOSE BLDC GLUCOMTR-MCNC: 73 MG/DL (ref 70–99)
GLUCOSE SERPL-MCNC: 88 MG/DL (ref 70–99)
HCT VFR BLD AUTO: 30.7 % (ref 40–53)
HGB BLD-MCNC: 9.6 G/DL (ref 13.3–17.7)
INR PPP: 1.55 (ref 0.86–1.14)
INTERPRETATION ECG - MUSE: NORMAL
Lab: ABNORMAL
MCH RBC QN AUTO: 30.5 PG (ref 26.5–33)
MCHC RBC AUTO-ENTMCNC: 31.3 G/DL (ref 31.5–36.5)
MCV RBC AUTO: 98 FL (ref 78–100)
PLATELET # BLD AUTO: 119 10E9/L (ref 150–450)
POTASSIUM SERPL-SCNC: 4.6 MMOL/L (ref 3.4–5.3)
PROT FLD-MCNC: 2.4 G/DL
PROT SERPL-MCNC: 6 G/DL (ref 6.8–8.8)
RBC # BLD AUTO: 3.15 10E12/L (ref 4.4–5.9)
SODIUM SERPL-SCNC: 143 MMOL/L (ref 133–144)
SPECIMEN SOURCE FLD: NORMAL
SPECIMEN SOURCE: ABNORMAL
SPECIMEN SOURCE: NORMAL
VANCOMYCIN SERPL-MCNC: 12.4 MG/L
WBC # BLD AUTO: 8.5 10E9/L (ref 4–11)

## 2019-06-13 PROCEDURE — 36415 COLL VENOUS BLD VENIPUNCTURE: CPT | Performed by: INTERNAL MEDICINE

## 2019-06-13 PROCEDURE — 25000128 H RX IP 250 OP 636: Performed by: PHYSICIAN ASSISTANT

## 2019-06-13 PROCEDURE — 12000000 ZZH R&B MED SURG/OB

## 2019-06-13 PROCEDURE — 97162 PT EVAL MOD COMPLEX 30 MIN: CPT | Mod: GP

## 2019-06-13 PROCEDURE — 97110 THERAPEUTIC EXERCISES: CPT | Mod: GP

## 2019-06-13 PROCEDURE — 97166 OT EVAL MOD COMPLEX 45 MIN: CPT | Mod: GO

## 2019-06-13 PROCEDURE — 00000146 ZZHCL STATISTIC GLUCOSE BY METER IP

## 2019-06-13 PROCEDURE — 92610 EVALUATE SWALLOWING FUNCTION: CPT | Mod: GN

## 2019-06-13 PROCEDURE — 80202 ASSAY OF VANCOMYCIN: CPT | Performed by: INTERNAL MEDICINE

## 2019-06-13 PROCEDURE — 25800030 ZZH RX IP 258 OP 636: Performed by: INTERNAL MEDICINE

## 2019-06-13 PROCEDURE — 97530 THERAPEUTIC ACTIVITIES: CPT | Mod: GO

## 2019-06-13 PROCEDURE — 85610 PROTHROMBIN TIME: CPT | Performed by: INTERNAL MEDICINE

## 2019-06-13 PROCEDURE — 97530 THERAPEUTIC ACTIVITIES: CPT | Mod: GP

## 2019-06-13 PROCEDURE — 25800030 ZZH RX IP 258 OP 636: Performed by: SURGERY

## 2019-06-13 PROCEDURE — 25000128 H RX IP 250 OP 636: Performed by: INTERNAL MEDICINE

## 2019-06-13 PROCEDURE — 99233 SBSQ HOSP IP/OBS HIGH 50: CPT | Performed by: PHYSICIAN ASSISTANT

## 2019-06-13 PROCEDURE — 25000128 H RX IP 250 OP 636: Performed by: SURGERY

## 2019-06-13 PROCEDURE — 85027 COMPLETE CBC AUTOMATED: CPT | Performed by: INTERNAL MEDICINE

## 2019-06-13 PROCEDURE — 80053 COMPREHEN METABOLIC PANEL: CPT | Performed by: INTERNAL MEDICINE

## 2019-06-13 RX ORDER — NICOTINE POLACRILEX 4 MG
15-30 LOZENGE BUCCAL
Status: DISCONTINUED | OUTPATIENT
Start: 2019-06-13 | End: 2019-06-19 | Stop reason: HOSPADM

## 2019-06-13 RX ORDER — DEXTROSE MONOHYDRATE 25 G/50ML
25-50 INJECTION, SOLUTION INTRAVENOUS
Status: DISCONTINUED | OUTPATIENT
Start: 2019-06-13 | End: 2019-06-19 | Stop reason: HOSPADM

## 2019-06-13 RX ORDER — ALBUMIN (HUMAN) 12.5 G/50ML
12.5 SOLUTION INTRAVENOUS ONCE
Status: DISCONTINUED | OUTPATIENT
Start: 2019-06-13 | End: 2019-06-18

## 2019-06-13 RX ADMIN — PIPERACILLIN SODIUM,TAZOBACTAM SODIUM 3.38 G: 3; .375 INJECTION, POWDER, FOR SOLUTION INTRAVENOUS at 00:16

## 2019-06-13 RX ADMIN — PIPERACILLIN SODIUM,TAZOBACTAM SODIUM 3.38 G: 3; .375 INJECTION, POWDER, FOR SOLUTION INTRAVENOUS at 06:19

## 2019-06-13 RX ADMIN — PIPERACILLIN SODIUM,TAZOBACTAM SODIUM 3.38 G: 3; .375 INJECTION, POWDER, FOR SOLUTION INTRAVENOUS at 18:29

## 2019-06-13 RX ADMIN — SODIUM CHLORIDE: 9 INJECTION, SOLUTION INTRAVENOUS at 00:16

## 2019-06-13 RX ADMIN — FENTANYL CITRATE 25 MCG: 50 INJECTION INTRAMUSCULAR; INTRAVENOUS at 04:34

## 2019-06-13 RX ADMIN — SODIUM CHLORIDE: 9 INJECTION, SOLUTION INTRAVENOUS at 17:11

## 2019-06-13 RX ADMIN — PIPERACILLIN SODIUM,TAZOBACTAM SODIUM 3.38 G: 3; .375 INJECTION, POWDER, FOR SOLUTION INTRAVENOUS at 12:17

## 2019-06-13 RX ADMIN — PIPERACILLIN SODIUM,TAZOBACTAM SODIUM 3.38 G: 3; .375 INJECTION, POWDER, FOR SOLUTION INTRAVENOUS at 23:51

## 2019-06-13 RX ADMIN — VANCOMYCIN HYDROCHLORIDE 1500 MG: 5 INJECTION, POWDER, LYOPHILIZED, FOR SOLUTION INTRAVENOUS at 13:52

## 2019-06-13 ASSESSMENT — ACTIVITIES OF DAILY LIVING (ADL)
ADLS_ACUITY_SCORE: 25
ADLS_ACUITY_SCORE: 24
ADLS_ACUITY_SCORE: 24
ADLS_ACUITY_SCORE: 25
ADLS_ACUITY_SCORE: 24
ADLS_ACUITY_SCORE: 25

## 2019-06-13 ASSESSMENT — MIFFLIN-ST. JEOR: SCORE: 1778.75

## 2019-06-13 NOTE — PLAN OF CARE
Discharge Planner PT   Patient plan for discharge: per wife, rehab if pt continues to make gains     Current status:     Eval complete, treatment indicated. Wife present and supportive, provided hx information. Pt follows commands. Difficult to undrestand at times secondary to impaired speech and cognition. VSS during session.     Pt engaged in supine LE strengthening exercises    Supine > sit with inc time for room set up and line management. Pt required Max  x 2 and use of sheet. Pt required CGA-min A x 2 to maintain midline sitting position. Pt demo posterior lean and R lateral lean. TC provided at L shoulder to improved WB through L side, fair improvement however pt does not maintain. Pt sat EOB ~ 8 minutes. Sit > supine with max A x 2. Total A x 2 for positioning in bed. pt left supine with needs in reach and wife present     Barriers to return to prior living situation: A x 2, cognition, gross weakness, impaired activity tolerance, impaired balance     Recommendations for discharge: tcu  Rationale for recommendations: Pt will benefit from continued skilled PT at TCU to improve strength, balance, gait, endurance to improve functional mobility prior to return home            Entered by: Marva Nguyen 06/13/2019 12:02 PM

## 2019-06-13 NOTE — PLAN OF CARE
Discharge Planner OT   Patient plan for discharge: not stated today  Current status: OT eval/tx initiated today. Pt lives w/ his wife in home w/ 0 STELLA. PTA, pt reports he is indep w/ ADL's and has A from wife for IADL's. Pt uses a walker w/in home and w/c for community mobility. Unsure if PLOF info is valid, as pt not fully oriented. Pt states the year is 1979. Pt was difficult to understand d/t impaired speech. May benefit from use of white board or paper to write out what he is trying to say. Pt did follow commands throughout session.    Currently, pt required max A x 2 for bed mobility and sat EOB, ranged from CGA x 2 to min-max A x 2, increased posterior lean at EOB. Pt washed face at EOB, required min A for thoroughness, more difficult w/ sitting balance during ADL.     Visual screen not fully assessed, however pt may have some decreased visual attention to L side.    Barriers to return to prior living situation: Level of A required, impaired mobility and ADL  Recommendations for discharge: TCU  Rationale for recommendations: Pt will require continued therapy intervention to improve safety and indep w/ ADL's and functional mobility prior to returning home.       Entered by: Eneida Duggan 06/13/2019 9:27 AM

## 2019-06-13 NOTE — PLAN OF CARE
Pt rests between cares. Awakens easily, follows commands, extremities gaining gross strength. Pt attempts to talk but is incomprehensible speech. Occasionally can understand yes/no. C/o generalized pain et at compression/cpr site in chest with good relief with prn fentanyl. VSS, uop adequate. 2L NC O2 added for noc. Abdomen becoming increasingly distended/firm. Pt has frequent outpt paracentesis. KARIE Copeland notified. To Discuss with Dr Hansen for possible comfort paracentesis? Wife home for the noc. Cont current plan of care.

## 2019-06-13 NOTE — PROGRESS NOTES
06/13/19 1134   Quick Adds   Type of Visit Initial PT Evaluation   Living Environment   Lives With spouse   Living Arrangements house   Home Accessibility no concerns   Transportation Anticipated family or friend will provide   Living Environment Comment 0 stairs at home e   Self-Care   Usual Activity Tolerance moderate   Current Activity Tolerance poor   Equipment Currently Used at Home walker, rolling;prosthesis   Activity/Exercise/Self-Care Comment Per wife pt IND with dressing, stands to shower    Functional Level Prior   Ambulation 1-->assistive equipment   Transferring 1-->assistive equipment   Toileting 1-->assistive equipment   Cognition 1 - attention or memory deficits   Which of the above functional risks had a recent onset or change? ambulation;transferring;toileting;bathing;dressing;cognition   Prior Functional Level Comment Pt's wife present to provide hx information. Pt is IND with bed mobility, donns prosthesis IND, walks with walker, has WC for longer distances   General Information   Onset of Illness/Injury or Date of Surgery - Date 06/11/19   Referring Physician Heather Richey MD   Pertinent History of Current Problem (include personal factors and/or comorbidities that impact the POC) Antonio Ramirez is a 74 year old male admitted on 6/11/2019 for pneumonia. He received antibiotics for this but had 2x cardiac arrest.   Precautions/Limitations fall precautions   Cognitive Status Examination   Orientation person   Cognitive Comment See OT evaluation, pt with slurred speech, difficult to understand at times, follows commands   Pain Assessment   Patient Currently in Pain No   Posture    Posture Forward head position   Range of Motion (ROM)   ROM Comment BLE WFL    Strength   Strength Comments Pt demonstrates antigravity strength in BLE    Bed Mobility   Bed Mobility Comments Supine > sit max A x 2   Transfer Skills   Transfer Comments Not initiated   Gait   Gait Comments Not initiated  "  Balance   Balance Comments poor sitting balance, min A x 2    Sensory Examination   Sensory Perception Comments Intact to light touch    General Therapy Interventions   Planned Therapy Interventions balance training;bed mobility training;gait training;neuromuscular re-education;prosthetic fitting/training;strengthening;transfer training   Clinical Impression   Criteria for Skilled Therapeutic Intervention yes, treatment indicated   PT Diagnosis Impaired functional mobility from baseline   Influenced by the following impairments medical condition, cognition, weakness, balance, endurance   Functional limitations due to impairments Impaired functional mobility from baseline   Clinical Presentation Evolving/Changing   Clinical Presentation Rationale A x 2, medical condition   Clinical Decision Making (Complexity) Moderate complexity   Therapy Frequency` daily   Predicted Duration of Therapy Intervention (days/wks) 1 week   Anticipated Discharge Disposition Transitional Care Facility   Risk & Benefits of therapy have been explained Yes   Patient, Family & other staff in agreement with plan of care Yes   [facility] AM-PAC  \"6 Clicks\" V.2 Basic Mobility Inpatient Short Form   1. Turning from your back to your side while in a flat bed without using bedrails? 2 - A Lot   2. Moving from lying on your back to sitting on the side of a flat bed without using bedrails? 2 - A Lot   3. Moving to and from a bed to a chair (including a wheelchair)? 1 - Total   4. Standing up from a chair using your arms (e.g., wheelchair, or bedside chair)? 1 - Total   5. To walk in hospital room? 1 - Total   6. Climbing 3-5 steps with a railing? 1 - Total   Basic Mobility Raw Score (Score out of 24.Lower scores equate to lower levels of function) 8   Total Evaluation Time   Total Evaluation Time (Minutes) 15     "

## 2019-06-13 NOTE — PROGRESS NOTES
[facility]    Medicine Progress Note - Hospitalist Service       Date of Admission:  6/11/2019  Assessment & Plan     Summary of hospital course:  Mr. Antonio Ramirez is a 74 year old man with multiple complex past medical history of HTN, HLD, CAD s/p CABG, hx of alcoholic liver cirrhosis with portal HTN and ascites s/p multiple paracenteses, systolic CHF, valvular disease, paroxysmal Afib on chronic anticoagulation with Xarelto, antiphospholipid antibody syndrome with history of DVT and PE s/p IVC filter, hx MSSA bacteremia with hx Lt BKA, traumatic subdural hematoma, who was brought in for evaluation of generalized weakness, cough and vomiting.   He has had a general decline in his overall health in the last few months with multiple falls and severely impaired mobility, subdural hematoma in 2018 causing significant impairment in cognition, among others. He had a paracentesis the day prior to admission and developed chills and possible pneumonia resulting in admission to the hospital 6/10/19. A few hours after admission he was found apneic and pulseless ultimately with PEA cardiac arrest with CPR ~4 minutes prior to ROSC, thought primary respiratory cause. Transferred to ICU where he was unresponsive and a few hours later he went into VT arrest and received 2 minutes of CPR resulting in ROSC. He has slow been more interactive with increasing cognitive function.   - 6/13/19: weaned to room air, following basic commands and asking/answering questions with some comprehensible garbled speech, transfer out of ICU. Continue GOC discussion with family.      Goals of Care:   Continuing antibiotics and IVF while we wait to see how much recovery patient has from a neurologic standpoint.   - Family ok with abx, O2, IVF, however no escalation of cares, no transfer back to ICU at this time, DNR/DNI, not formally comfort care  - If respiratory worsens - NO INTUBATION, if requiring more than NC will need to speak  with Wife  - Would like GI evaluation and paracentesis when appropriate, hold Xarelto until decided about paracentesis  - Hold on Neurology and Nephrology consultations - reassess daily  - Point of contact is DNR/DNI    Encephalopathy likely due to anoxia s/p Cardiac Arrest x2, 6/11/19  PEA cardiac arrest a few hours after admission, thought initially due to respiratory arrest. 1 round of CPR before achieving ROSC, and transfer to ICU. A few hours later the patient went into pulseless VT arrest and again received 1 round of CPR. He was terminally extubated later that day, see Code Blue and ICU notes for details. Baseline impaired cognitive function now with likely anoxic brain injury based on documented time without pulse as well as examination. Degree of anticipated impairment unclear but has prior brain injury that would make him more fragile.   Could not rule out seizure as a cause of his acute loss of consciousness, but no epileptiform activity on EEG, it did however show severe encephalopathy on 6/11/19.   Brain MRI 6/12/19: chronic subdural collection at Rt cerebral convexity since 2/2019. Diffuse cerebral volume loss. No acute intracranial pathology.  - Hold sedatives  - Judicious use of pain medications if needed  - has not been seen by Neuro formally, given arrest and now improvement in neurological function, will d/w family about posting neurology formally and they would like to wait another day or two to see improvement  - Continue PT/OT   - Reconsult SW when more appropriate    Mild/Moderate Oropharyngeal Dysphagia  - Consulted SLP - DD2/NTL, 1:1 supervision     Shock, septic vs cardiogenic   Community-acquired pneumonia versus aspiration pneumonia:    He presented with generalized weakness and coughing, which was nonproductive. Weakness is not really new and he called EMS 6 times in the last month for recurrent falls. Tmax 101.4 in ER.  CXR mentions mild right basilar atelectasis or pneumonia and  Abd/Pelvic CT confirmed patchy infiltrates in the right middle lobe and lingula, which might be pneumonia with bilateral pleural effusions and associated atelectasis. Procalcitonin 0.1 and WBC 10.2 on admit. Admitted to swallowing problems sometimes PTA, so aspiration pneumonia might be a possibility.   Given 1 dose of ceftriaxone and 1 dose of doxycycline in the ER. Given the fact that he had a paracentesis the day before admission on 06/10/2019, iatrogenic peritonitis cannot be completely excluded at this time.    BC day of admit growing staph simulans likely contaminant. Other Bcx neg.  - Continue antibiotics Zosyn and vanocmycin  - Weaned to room air, O2 PRN  - family ok with abx, O2, no escalation of cares, no transfer back to ICU at this time, DNR/DNI, NOT formally comfort care    Alcoholic liver cirrhosis with recurrent ascites and portal hypertension.   Anasarca.   Alcohol abuse/dependence:    He continues to drink despite his advanced liver disease.  He had multiple paracenteses in the past, last paracentesis was done day PTA 06/10/2019 with 5.4 L of fluid removed. Presented with fever and generalized weakness, thought possibly related to pneumonia. He received 1 dose of ceftriaxone, 1 dose of doxycycline in ER.    He has been compliant with his torsemide and spironolactone.   EGD 2/2019 did not show esophageal varices, just portal hypertensive gastropathy.    - GI consulted on admission, not seen due to cardiac arrest and goals of care, now that pt's neurological statis is improving, d/w family and they would like GI evaluation for possible palliative paracentesis and opinion regarding diuresis once ANSELMO improved   - Zosyn/vanco as above, would also treat intraabdominal infxn  - Holding PTA torsemide 20 mg p.o. BID and Aldactone 25 mg p.o. Daily due to renal dysfunction    Hx antiphospholipid antibody syndrome:     Hx deep venous thrombosis and pulmonary embolism:    On chronic anticoagulation with Xarelto.  Status post inferior vena cava filter in place.    - Xarelto held on admission for repeat paracentesis, never restarted after cardiac arrest  - d/w family if they would like AC resumed, Brain MRI without changes to prior subdural - hold off until seen by GI and evaluating improvement    Chronic systolic CHF with EF 40%-45%.   Severe aortic stenosis, moderate mitral regurgitation, moderate to severe tricuspid regurgitation.   ECHO 6/11/19: very difficult study, moderate aortic stenosis, mod decreased RV systolic function. RV moderately dilated, RA mod-severely dilated. Mild/mod MR and TR. EF 35-40%. Septal motion c/w conduction abnormality.   - Continue gentle IVF due to ANSELMO, will need to monitor closely to prevent fluid overload  - Pending family goals and patient progress, may want to repeat ECHO    CAD with history of 3-vessel coronary artery bypass grafting  Aortic stenosis, Thoracic aortic aneurysm without rupture   Hypertension, Dyslipidemia    - PTA Coreg 3.125mg PO BID on hold due to arrest and initial need for pressors after arrest. May be able to resume tomorrow.  - No ASA PTA due to Xarelto.   - Holding PTA Demadex 20mg PO BID and Aldactone    Paroxysmal atrial fibrillation:    On chronic anticoagulation with Xarelto PTA. On admit, pt in atrial fibrillation with rate control.   - Holding coreg and Xarelto as above      Peripheral neuropathy:  Hold PTA gabapentin pending neurological improvement     Anasarca, improved  Chronic Lymphedema  He does have significant lower extremity pitting edema on admission.  He uses lymphedema wrapping, although not very compliant.    - Lymphedema consult      Generalized weakness  Seems to be an ongoing problem for him.  He called 911 six times last month because of recurrent falls. Has Lt BKA. He uses a walker or a wheelchair at home.  He lives with his wife.    - PT/OT/SW     Pressure ulcer, POA  - Wound care and dressing changes per protocol    Alcohol abuse/dependence:     He reported drinking 4-5 ounces of vodka daily.  Apparently, he did not have withdrawal symptoms in the past during his hospitalizations.    - Monitor for any withdrawal symptoms  - Seen by Psychiatry during his prior hospitalization, both at that time and on this admission the patient reported that he is not interested in quitting drinking, despite his advanced liver disease.     ANSELMO on CKD, stage III, worsening  His most recent creatinine level baseline was between 1.7 and 1.9. Worsening Cr trend.  Cr: 1.99 --> 2.05 --> 2.17 --> 2.27 --> 2.53 --> 2.57  - We will closely monitor his creatinine level   - We will avoid nephrotoxic drugs.   - Continue IVF  - Post nephrology if family wants to continue with cares, resasses daily    Diet: Combination Diet Dysphagia Diet Level 2: Mechan Altered; Nectar Thickened Liquids (pre-thickened or use instant food thickener)    DVT Prophylaxis: PCD until able to resume Xarelto, will d/w family  Levin Catheter: in place, indication: Strict 1-2 Hour I&O  Code Status: DNR/DNI      Disposition Plan   Expected discharge: 4 - 7 days+, recommended to TCU or ARU once safe disposition plan/ TCU bed available and mental status and infection improved.  Entered: Niki Rivera PA-C 06/13/2019, 12:25 PM       The patient's care was discussed with the Attending Physician, Dr. Hogue, Bedside Nurse and Patient.    Niki Rivera PA-C  Hospitalist Service  [facility]    ______________________________________________________________________    Interval History   More interactive today, able to follow commands and answer simple questions by voice, damaris Xavier N/V. Working with OT today, A2. Passed SLP with mild/mod oropharyngeal dysphagia.  D/w wife at bedside today - reveiwed [facility] as above.   DNR/DNI, no txr to ICU  GI consult, hold on Neuro/Nephro  Hold Xarelto until possible paracentesis      Data reviewed today: I reviewed all medications, new labs and imaging  results over the last 24 hours. I personally reviewed no images or EKG's today.    Physical Exam   Vital Signs: Temp: 98.8  F (37.1  C) Temp src: Bladder BP: 123/66 Pulse: 61 Heart Rate: 70 Resp: 18 SpO2: 93 % O2 Device: None (Room air) Oxygen Delivery: 2 LPM  Weight: 215 lbs 13.29 oz    General: Awake, alert, elderly male who appears stated age. Looks comfortable laying in bed in ICU. No acute distress.  HEENT: Normocephalic, atraumatic. Extraocular movements intact. Garbled wet speech.    Respiratory: Clear to auscultation bilaterally, no rales or wheezing to anterolateral fields. End expiration with rhonchorous wet breath sounds.  Cardiovascular: Regular rate and rhythm, +S1 and S2, loud systolic murmur at RUSB. Trace to 1+ BLE edema.  Gastrointestinal: Soft, non-tender, protuberant abdomen with puncture areas from previous paracentesis. Mildly firm to upper abdomen.   Skin: Warm, dry. No obvious rashes or lesions on exposed skin. Dorsalis pedis pulse palpable to RLE. Lt BKA, stump intact.   Musculoskeletal:  strength intact bilaterally, Lt hand slightly weaker than Rt.   Neurologic: AAO x2, garbled speech - some comprehensible, other uncomprehensible. Unable to report year or president. +sensation to BLE. Wiggles toes to RLE, moves Lt BKA appropriately. RLE stasis dermatitis with slight erythema.   Psychiatric: Appropriate mood and affect. No obvious anxiety or depression.    Data   Recent Labs   Lab 06/13/19  0539 06/12/19  1057 06/11/19  1638 06/11/19  1030 06/11/19  1029 06/11/19  0710   WBC 8.5 10.7  --   --  21.3* 9.7   HGB 9.6* 10.2*  --  10.9* 10.7* 10.7*   MCV 98 95  --   --  94 95   * 115*  --   --  160 159   INR 1.55* 1.68*  --   --  1.70* 1.65*    139 137 137 138 137   POTASSIUM 4.6 5.0 5.2 5.2 5.2 5.0   CHLORIDE 111* 109 107  --  107 105   CO2 26 24 26  --  27 24   BUN 49* 50* 48*  --  46* 46*   CR 2.57* 2.53* 2.27*  --  2.17* 2.05*   ANIONGAP 6 6 4  --  4 8   BENNIE 8.4* 8.3* 8.2*  --   8.1* 8.2*   GLC 88 92 111*  --  107* 138*   ALBUMIN 2.1* 2.1* 2.4*  --  2.4* 2.5*   PROTTOTAL 6.0* 6.1* 6.2*  --  6.5* 6.7*   BILITOTAL 1.1 1.3 1.2  --  1.2 0.9   ALKPHOS 62 72 82  --  83 88   ALT 11 11 12  --  12 11   AST 22 25 26  --  28 27   TROPI  --   --   --   --  0.433* 0.027     Recent Results (from the past 24 hour(s))   MR Brain w/o Contrast    Narrative    MRI OF THE BRAIN WITHOUT CONTRAST 6/12/2019 2:51 PM     COMPARISON: Head CT 6/11/2019.    HISTORY:  Altered level of consciousness (LOC), unexplained; cardiac  arrest. Evaluate for anoxic brain injury.     TECHNIQUE:   Brain: Axial diffusion-weighted with ADC map, T2-weighted with fat  saturation, T1-weighted and turboFLAIR and sagittal T1-weighted images  of the brain were obtained without intravenous contrast.     FINDINGS: There is moderate diffuse cerebral volume loss. There are  numerous scattered focal and mild patchy periventricular areas of  abnormal T2 signal hyperintensity in the cerebral white matter  bilaterally that are consistent with sequela of chronic small vessel  ischemic disease. A small focus of chronic encephalomalacia at the  high posterolateral aspect of the left frontal lobe is again noted,  likely representing a small chronic ischemic infarct.    The ventricles and basal cisterns are within normal limits in  configuration given the degree of cerebral volume loss.  There is no  midline shift.  Very thin subdural collection along the right cerebral  convexity again noted without change dating back to a head CT from  2/17/2019. There are no new extra-axial fluid collections.  There is  no evidence for stroke or acute intracranial hemorrhage.    There is no sinusitis or mastoiditis.      Impression    IMPRESSION:  Thin chronic subdural collection along the right cerebral  convexity without change dating back to 2/17/2019. Diffuse cerebral  volume loss and cerebral white matter changes consistent with chronic  small vessel ischemic  disease. No evidence for acute intracranial  pathology.    TODD CRENSHAW MD     Medications     - MEDICATION INSTRUCTIONS -       sodium chloride 75 mL/hr at 06/13/19 0905       albumin human  12.5 g Intravenous Once     folic acid  1 mg Oral Daily     multivitamin, therapeutic  1 tablet Oral Daily     piperacillin-tazobactam  3.375 g Intravenous Q6H     sodium chloride (PF)  3 mL Intracatheter Q8H     vancomycin (VANCOCIN) IV  1,500 mg Intravenous Once     vancomycin place farmer - receiving intermittent dosing  1 each Does not apply See Admin Instructions

## 2019-06-13 NOTE — PHARMACY-VANCOMYCIN DOSING SERVICE
Pharmacy Vancomycin Note  Date of Service 2019  Patient's  1945   74 year old, male    Indication: Bacteremia  Goal Trough Level: 15-20 mg/L  Day of Therapy: 2  Current Vancomycin regimen: Intermittent dosing based on levels.    Current estimated CrCl = Estimated Creatinine Clearance: 33.9 mL/min (A) (based on SCr of 2.57 mg/dL (H)).    Creatinine for last 3 days  2019:  2:05 AM Creatinine 1.99 mg/dL;  7:10 AM Creatinine 2.05 mg/dL; 10:29 AM Creatinine 2.17 mg/dL;  4:38 PM Creatinine 2.27 mg/dL  2019: 10:57 AM Creatinine 2.53 mg/dL  2019:  5:39 AM Creatinine 2.57 mg/dL    Recent Vancomycin Levels (past 3 days)  2019: 11:01 AM Vancomycin Level 12.4 mg/L    Vancomycin IV Administrations (past 72 hours)                   vancomycin 1500 mg in 0.9% NaCl 250 ml intermittent infusion 1,500 mg (mg) 1,500 mg Given 19 1352    vancomycin (VANCOCIN) 2,000 mg in sodium chloride 0.9 % 500 mL intermittent infusion (mg) 2,000 mg New Bag 19 0920                Nephrotoxins and other renal medications (From now, onward)    Start     Dose/Rate Route Frequency Ordered Stop    19 1300  vancomycin 1500 mg in 0.9% NaCl 250 ml intermittent infusion 1,500 mg      1,500 mg  over 90 Minutes Intravenous ONCE 19 1200      19 0831  vancomycin place farmer - receiving intermittent dosing      1 each Does not apply SEE ADMIN INSTRUCTIONS 19 0831      19 0600  piperacillin-tazobactam (ZOSYN) 3.375 g vial to attach to  mL bag     Note to Pharmacy:  Pharmacy can adjust dose based on renal function.    3.375 g  over 30 Minutes Intravenous EVERY 6 HOURS 19 0530               Contrast Orders - past 72 hours (72h ago, onward)    Start     Dose/Rate Route Frequency Ordered Stop    19 1445  perflutren diluted 1mL to 2mL with saline (OPTISON) diluted injection 3 mL     Note to Pharmacy:  NDC 7737-3417-93    3 mL Intravenous ONCE 19 1441 19 1441           Interpretation of levels and current regimen:  Trough level is  Subtherapeutic; however, not at steady state yet.      Has serum creatinine changed > 50% in last 72 hours: No    Urine output:  Stable    Renal Function: Worsening    Plan:  1.  Will re-dose with Vancomycin 1500 mg IV x 1 dose and re-check Vancomycin level in ~ 24 hours.  2.  Pharmacy will check trough levels as appropriate in ~ 24 hours..    3. Serum creatinine levels will be ordered daily for the first week of therapy and at least twice weekly for subsequent weeks.      Patricia Esparza, PharmD, BCPS        .

## 2019-06-13 NOTE — PROGRESS NOTES
06/13/19 1000   General Information   Onset Date 06/11/19   Start of Care Date 06/13/19   Referring Physician Niki Rivera PA-C   Patient Profile Review/OT: Additional Occupational Profile Info See Profile for full history and prior level of function   Patient/Family Goals Statement None stated    Swallowing Evaluation Bedside swallow evaluation   Behaviorial Observations Confused;Distractible;Impulsive   Mode of current nutrition NPO   Respiratory Status Room air   Comments Mr. Antonio Ramirez is a 74 year old man with multiple complex past medical history of HTN, HLD, CAD s/p CABG, hx of alcoholic liver cirrhosis with portal HTN and ascites s/p multiple paracenteses, systolic CHF, valvular disease, paroxysmal Afib on chronic anticoagulation with Xarelto, antiphospholipid antibody syndrome with history of DVT and PE s/p IVC filter, hx MSSA bacteremia with hx Lt BKA, traumatic subdural hematoma, who was brought in for evaluation of generalized weakness, cough and vomiting. Pt has a hx with SLP department - he was seen last year for cognition at the ARU and earlier this year (March) for dysphagia with recommendations for regular solids and thin liquids. Across this time frame pt has intermittently been recommended to complete a video swallow, however he has always refused so it has not been done. Upon evaluation today, pt is alert, agreeable, follows directions and answers questions. He denies difficulty swallowing. Pt is confused intermittently not knowing and/or believing he is in the hospital   Clinical Swallow Evaluation   Oral Musculature generally intact;unable to assess due to poor participation/comprehension   Structural Abnormalities none present   Dentition present and adequate   Mucosal Quality adequate   Oral Labial Strength and Mobility impaired coordination;WFL   Lingual Strength and Mobility WFL;impaired coordination   Velar Elevation intact   Buccal Strength and Mobility impaired    Laryngeal Function Cough;Swallow;Voicing initiated   Additional Documentation Yes   Swallow Eval   Feeding Assistance frequent cues/help required   Clinical Swallow Eval: Thin Liquid Texture Trial   Mode of Presentation, Thin Liquids cup;self-fed;fed by clinician   Volume of Liquid or Food Presented 3 cup sips    Oral Phase of Swallow Premature pharyngeal entry   Oral Residue right lip drooling   Pharyngeal Phase of Swallow impaired;coughing/choking;throat clearing   Diagnostic Statement Delayed throat clear, subtle cough, anterior labial loss    Clinical Swallow Eval: Nectar Thick Liquid Texture Trial   Mode of Presentation, Nectar spoon;straw;self-fed;fed by clinician   Volume of Nectar Presented 4 oz    Oral Phase, Nectar Premature pharyngeal entry   Oral Residue, Nectar right lip drooling  (when taking independent cup sips )   Pharyngeal Phase, [place] intact   Diagnostic Statement No overt s/sx of aspiration    Clinical Swallow Eval: Puree Solid Texture Trial   Mode of Presentation, Puree spoon;fed by clinician   Volume of Puree Presented 4 oz    Oral Phase, Puree WFL   Pharyngeal Phase, Puree intact   Diagnostic Statement Adequate oral manipulation, no s/sx of aspiration    Clinical Swallow Eval: Solid Food Texture Trial   Mode of Presentation, Solid self-fed;fed by clinician   Volume of Solid Food Presented 1 cracker    Oral Phase, Solid Poor AP movement;Residue in oral cavity   Pharyngeal Phase, Solid intact   Diagnostic Statement Disorgranized oral manipulation, bolus formation and prolonged oral clearance time. No s/sx of aspiration, pain or globus sensation    Swallow Compensations   Swallow Compensations Alternate viscosity of consistencies;Pacing   Results No difficulties noted   Esophageal Phase of Swallow   Patient reports or presents with symptoms of esophageal dysphagia No   General Therapy Interventions   Planned Therapy Interventions Dysphagia Treatment   Dysphagia treatment Modified diet  education;Instruction of safe swallow strategies   Swallow Eval: Clinical Impressions   Skilled Criteria for Therapy Intervention Skilled criteria met.  Treatment indicated.   Functional Assessment Scale (FAS) 4   Treatment Diagnosis Mild-moderate oropharyngeal dysphagia    Diet texture recommendations Dysphagia diet level 2;Nectar thick liquids   Recommended Feeding/Eating Techniques alternate between small bites and sips of food/liquid;check mouth frequently for oral residue/pocketing;maintain upright posture during/after eating for 30 mins;small sips/bites   Therapy Frequency 5 times/wk   Predicted Duration of Therapy Intervention (days/wks) 1 week    Anticipated Discharge Disposition extended care facility   Risks and Benefits of Treatment have been explained. Yes   Patient, family and/or staff in agreement with Plan of Care Yes   Clinical Impression Comments Pt seen for clinical swallow evaluation. He is alert, agreeable and seated upright. He does answer questions and participate but is confused and continuously asked where he was. Pt has been seen by SLP department in the past and recommended regular solids and thin liquids with consideration for a video swallow, however pt had continuously refused. Pt's vocal quality is hoarse and dysphonic. Pt presents with reduced oral control, anterior labial spillage with thin and nectar thick liquids by cup sip when self feeding. Suspect premature spillage to the pharynx, greatest with thin liquids. Pt presented with throat clear and delayed cough with thin liquids, suspect potential reduced airway protection. Improved timing and coordination of swallow with nectar thick liquids, pt tolerated 4 oz by cup and straw without overt s/sx of aspiration, changes in breath sounds or vocal quality. Pt also consumed 4 oz of puree and 1 cracker without s/sx of aspiration. Mastication of cracker is slow with prolonged bolus formation and oral clearance. Recommend dysphagia diet 2  and nectar thick liquids with 1:1 supervision and feeding assistance. Pt to be seated fully upright, small bite/sips, slow rate of intake, straws ok if by single sip.    Total Evaluation Time   Total Evaluation Time (Minutes) 15

## 2019-06-13 NOTE — PROGRESS NOTES
06/13/19 0900   Quick Adds   Type of Visit Initial Occupational Therapy Evaluation   Living Environment   Lives With spouse   Living Arrangements house   Home Accessibility no concerns   Living Environment Comment Pt lives w/ wife in home w/ 0 STELLA   Functional Level   Ambulation 1-->assistive equipment   Transferring 1-->assistive equipment   Toileting 1-->assistive equipment   Bathing 1-->assistive equipment   Dressing 0-->independent   Eating 0-->independent   Communication 0-->understands/communicates without difficulty   Swallowing 0-->swallows foods/liquids without difficulty   Cognition 1 - attention or memory deficits   Fall history within last six months yes   Number of times patient has fallen within last six months 6   Which of the above functional risks had a recent onset or change? ambulation;transferring;toileting;bathing;dressing;cognition   Prior Functional Level Comment per pt, pt mod I w/ all ADLs and has assist w/ IADL's from wife. Pt has a prosthesis and dons indly. Pt reports he uses a walker for mobility w/in home and a w/c for community mobility. Pt has a walk-in shower w/ shower chair and grab bars.    General Information   Onset of Illness/Injury or Date of Surgery - Date 06/10/19   Referring Physician Heather Richey MD   Patient/Family Goals Statement none specifically stated   Additional Occupational Profile Info/Pertinent History of Current Problem Pt admitted initially d/t fall from bed. Pt went into cardiac arrest 10/11 requiring ~4 minutes of CPR.   Precautions/Limitations fall precautions   Cognitive Status Examination   Level of Consciousness alert   Follows Commands (Cognition) WFL   Memory impaired   Cognitive Comment Pt reports it is 1979, pt is unsure of president.  Difficult to assess d/t mumbled speech    Visual Perception   Visual Perception Comments Possibly some L side inattention   Pain Assessment   Patient Currently in Pain No   Range of Motion (ROM)   ROM Comment  BUE AROM WNL   Strength   Strength Comments BUE not fully assessed d/t likely would cause pain from CPR, however atleast 4-/5   Hand Strength   Hand Strength Comments diminished in both hands    Coordination   Coordination Comments Impaired finger to thumb opposition   Mobility   Bed Mobility Comments Max A x 2    Balance   Balance Quick Add Sitting balance: static;Sitting balance: dynamic   Sitting Balance: Static fair balance   Sitting Balance: Dynamic poor balance   Upper Body Dressing   Level of [place]: Dress Upper Body minimum assist (75% patients effort)   Lower Body Dressing   Level of [place]: Dress Lower Body dependent (less than 25% patients effort)   Toileting   Level of [place]: Toilet dependent (less than 25% patients effort)   Grooming   Level of [place]: Grooming minimum assist (75% patients effort)   Eating/Self Feeding   Level of [place]: Eating independent   Activities of Daily Living Analysis   Impairments Contributing to Impaired Activities of Daily Living balance impaired;cognition impaired;coordination impaired;postural control impaired;strength decreased   General Therapy Interventions   Planned Therapy Interventions ADL retraining;bed mobility training;balance training;cognition;fine motor coordination training;strengthening;progressive activity/exercise   Clinical Impression   Criteria for Skilled Therapeutic Interventions Met yes, treatment indicated   OT Diagnosis Decreased ADL's and functional mobility   Influenced by the following impairments Impaired balance, strength, functional act tolerance, cognition, coordination   Assessment of Occupational Performance 3-5 Performance Deficits   Identified Performance Deficits Decreased ADL's and functional mobility   Clinical Decision Making (Complexity) Moderate complexity   Therapy Frequency daily   Predicted Duration of Therapy Intervention (days/wks) 7 days   Anticipated Discharge Disposition Transitional Care  "Facility   Risks and Benefits of Treatment have been explained. Yes   Patient, Family & other staff in agreement with plan of care Yes   [facility] TM \"6 Clicks\"   2016, Trustees of [facility], under license to [employer].  All rights reserved.   6 Clicks Short Forms Daily Activity Inpatient Short Form   [facility] AM-PAC  \"6 Clicks\" Daily Activity Inpatient Short Form   1. Putting on and taking off regular lower body clothing? 1 - Total   2. Bathing (including washing, rinsing, drying)? 1 - Total   3. Toileting, which includes using toilet, bedpan or urinal? 1 - Total   4. Putting on and taking off regular upper body clothing? 3 - A Little   5. Taking care of personal grooming such as brushing teeth? 3 - A Little   6. Eating meals? 4 - None   Daily Activity Raw Score (Score out of 24.Lower scores equate to lower levels of function) 13   Total Evaluation Time   Total Evaluation Time (Minutes) 10     "

## 2019-06-13 NOTE — PLAN OF CARE
Discharge Planner SLP   Patient plan for discharge: Did not discuss  Current status: Pt seen for clinical swallow evaluation. He is alert, agreeable and seated upright. He does answer questions and participate but is confused and continuously asked where he was. Pt has been seen by SLP department in the past and recommended regular solids and thin liquids with consideration for a video swallow, however pt had continuously refused. Pt's vocal quality is hoarse and dysphonic. Pt presents with reduced oral control, anterior labial spillage with thin and nectar thick liquids by cup sip when self feeding. Suspect premature spillage to the pharynx, greatest with thin liquids. Pt presented with throat clear and delayed cough with thin liquids, suspect potential reduced airway protection. Improved timing and coordination of swallow with nectar thick liquids, pt tolerated 4 oz by cup and straw without overt s/sx of aspiration, changes in breath sounds or vocal quality. Pt also consumed 4 oz of puree and 1 cracker without s/sx of aspiration. Mastication of cracker is slow with prolonged bolus formation and oral clearance. Pt at risk for aspiration with thin liquids.     Recommend dysphagia diet 2 and nectar thick liquids with 1:1 supervision and feeding assistance. Pt to be seated fully upright, small bite/sips, slow rate of intake, straws ok if by single sip.     Barriers to return to prior living situation: Below baseline, cognition, aspiration risk   Recommendations for discharge: TCU  Rationale for recommendations: SLP at next level of care for management of dysphagia and cog/ling deficits as appropriate          Entered by: Yue Pratt 06/13/2019 11:17 AM

## 2019-06-13 NOTE — PLAN OF CARE
Patient's speech and orientation continues to improve throughout the day.  Speech remains garbled with some word finding difficulties but certainly improving.  Speech evaluated and cleared patient for DD2 diet.  Having thickened orange juice, apple sauce, and pudding with assistance.  Abdomen tight and painful with big turns but has denied the need for pain meds throughout the day.  Wife at bedside much of day and updated.  Patient dangled at bedside with OT with heavy assist of 2.  Transfer orders obtained for a medical bed.  Awaiting call back from floor nurse to take report and then will transfer via cart.

## 2019-06-14 ENCOUNTER — APPOINTMENT (OUTPATIENT)
Dept: ULTRASOUND IMAGING | Facility: CLINIC | Age: 74
DRG: 871 | End: 2019-06-14
Attending: PHYSICIAN ASSISTANT
Payer: MEDICARE

## 2019-06-14 ENCOUNTER — APPOINTMENT (OUTPATIENT)
Dept: OCCUPATIONAL THERAPY | Facility: CLINIC | Age: 74
DRG: 871 | End: 2019-06-14
Payer: MEDICARE

## 2019-06-14 ENCOUNTER — APPOINTMENT (OUTPATIENT)
Dept: PHYSICAL THERAPY | Facility: CLINIC | Age: 74
DRG: 871 | End: 2019-06-14
Payer: MEDICARE

## 2019-06-14 LAB
ALBUMIN SERPL-MCNC: 2.2 G/DL (ref 3.4–5)
ALP SERPL-CCNC: 65 U/L (ref 40–150)
ALT SERPL W P-5'-P-CCNC: 10 U/L (ref 0–70)
ANION GAP SERPL CALCULATED.3IONS-SCNC: 7 MMOL/L (ref 3–14)
APPEARANCE FLD: NORMAL
APPEARANCE FLD: NORMAL
AST SERPL W P-5'-P-CCNC: 20 U/L (ref 0–45)
BILIRUB SERPL-MCNC: 1.3 MG/DL (ref 0.2–1.3)
BUN SERPL-MCNC: 43 MG/DL (ref 7–30)
CALCIUM SERPL-MCNC: 8.8 MG/DL (ref 8.5–10.1)
CHLORIDE SERPL-SCNC: 114 MMOL/L (ref 94–109)
CO2 SERPL-SCNC: 22 MMOL/L (ref 20–32)
COLOR FLD: NORMAL
COLOR FLD: NORMAL
CREAT SERPL-MCNC: 2.3 MG/DL (ref 0.66–1.25)
ERYTHROCYTE [DISTWIDTH] IN BLOOD BY AUTOMATED COUNT: 15.9 % (ref 10–15)
GFR SERPL CREATININE-BSD FRML MDRD: 27 ML/MIN/{1.73_M2}
GLUCOSE SERPL-MCNC: 101 MG/DL (ref 70–99)
GRAM STN SPEC: NORMAL
HCT VFR BLD AUTO: 29.9 % (ref 40–53)
HGB BLD-MCNC: 9.8 G/DL (ref 13.3–17.7)
INR PPP: 1.53 (ref 0.86–1.14)
LYMPHOCYTES NFR FLD MANUAL: 38 %
LYMPHOCYTES NFR FLD MANUAL: 57 %
MCH RBC QN AUTO: 31.2 PG (ref 26.5–33)
MCHC RBC AUTO-ENTMCNC: 32.8 G/DL (ref 31.5–36.5)
MCV RBC AUTO: 95 FL (ref 78–100)
MONOS+MACROS NFR FLD MANUAL: 23 %
MONOS+MACROS NFR FLD MANUAL: 28 %
NEUTS BAND NFR FLD MANUAL: 34 %
NEUTS BAND NFR FLD MANUAL: 9 %
OTHER CELLS FLD MANUAL: 11 %
PLATELET # BLD AUTO: 112 10E9/L (ref 150–450)
POTASSIUM SERPL-SCNC: 3.9 MMOL/L (ref 3.4–5.3)
PROT SERPL-MCNC: 6.3 G/DL (ref 6.8–8.8)
RBC # BLD AUTO: 3.14 10E12/L (ref 4.4–5.9)
SODIUM SERPL-SCNC: 143 MMOL/L (ref 133–144)
SPECIMEN SOURCE FLD: NORMAL
SPECIMEN SOURCE FLD: NORMAL
SPECIMEN SOURCE: NORMAL
VANCOMYCIN SERPL-MCNC: 18.8 MG/L
WBC # BLD AUTO: 6.3 10E9/L (ref 4–11)
WBC # FLD AUTO: 362 /UL
WBC # FLD AUTO: 550 /UL

## 2019-06-14 PROCEDURE — 40000863 ZZH STATISTIC RADIOLOGY XRAY, US, CT, MAR, NM

## 2019-06-14 PROCEDURE — 0W9G3ZZ DRAINAGE OF PERITONEAL CAVITY, PERCUTANEOUS APPROACH: ICD-10-PCS | Performed by: RADIOLOGY

## 2019-06-14 PROCEDURE — 27210190 US PARACENTESIS

## 2019-06-14 PROCEDURE — 25000128 H RX IP 250 OP 636: Performed by: INTERNAL MEDICINE

## 2019-06-14 PROCEDURE — 25000132 ZZH RX MED GY IP 250 OP 250 PS 637: Mod: GY | Performed by: PHYSICIAN ASSISTANT

## 2019-06-14 PROCEDURE — 25800030 ZZH RX IP 258 OP 636: Performed by: PHYSICIAN ASSISTANT

## 2019-06-14 PROCEDURE — 80202 ASSAY OF VANCOMYCIN: CPT | Performed by: PHYSICIAN ASSISTANT

## 2019-06-14 PROCEDURE — 99233 SBSQ HOSP IP/OBS HIGH 50: CPT | Performed by: HOSPITALIST

## 2019-06-14 PROCEDURE — 85027 COMPLETE CBC AUTOMATED: CPT | Performed by: PHYSICIAN ASSISTANT

## 2019-06-14 PROCEDURE — 25000128 H RX IP 250 OP 636: Performed by: PHYSICIAN ASSISTANT

## 2019-06-14 PROCEDURE — 12000000 ZZH R&B MED SURG/OB

## 2019-06-14 PROCEDURE — 25000125 ZZHC RX 250: Performed by: RADIOLOGY

## 2019-06-14 PROCEDURE — 85610 PROTHROMBIN TIME: CPT | Performed by: PHYSICIAN ASSISTANT

## 2019-06-14 PROCEDURE — 36415 COLL VENOUS BLD VENIPUNCTURE: CPT | Performed by: PHYSICIAN ASSISTANT

## 2019-06-14 PROCEDURE — 97530 THERAPEUTIC ACTIVITIES: CPT | Mod: GO | Performed by: OCCUPATIONAL THERAPIST

## 2019-06-14 PROCEDURE — A9270 NON-COVERED ITEM OR SERVICE: HCPCS | Mod: GY | Performed by: PHYSICIAN ASSISTANT

## 2019-06-14 PROCEDURE — 97110 THERAPEUTIC EXERCISES: CPT | Mod: GP

## 2019-06-14 PROCEDURE — 80053 COMPREHEN METABOLIC PANEL: CPT | Performed by: PHYSICIAN ASSISTANT

## 2019-06-14 PROCEDURE — 89051 BODY FLUID CELL COUNT: CPT | Performed by: PHYSICIAN ASSISTANT

## 2019-06-14 RX ORDER — LORAZEPAM 2 MG/ML
0.5 INJECTION INTRAMUSCULAR
Status: COMPLETED | OUTPATIENT
Start: 2019-06-14 | End: 2019-06-14

## 2019-06-14 RX ORDER — HYDROMORPHONE HYDROCHLORIDE 1 MG/ML
.2-.4 INJECTION, SOLUTION INTRAMUSCULAR; INTRAVENOUS; SUBCUTANEOUS
Status: DISCONTINUED | OUTPATIENT
Start: 2019-06-14 | End: 2019-06-19 | Stop reason: HOSPADM

## 2019-06-14 RX ORDER — ALBUMIN (HUMAN) 12.5 G/50ML
12.5-5 SOLUTION INTRAVENOUS ONCE
Status: DISCONTINUED | OUTPATIENT
Start: 2019-06-14 | End: 2019-06-14 | Stop reason: CLARIF

## 2019-06-14 RX ORDER — LIDOCAINE HYDROCHLORIDE 10 MG/ML
10 INJECTION, SOLUTION EPIDURAL; INFILTRATION; INTRACAUDAL; PERINEURAL ONCE
Status: COMPLETED | OUTPATIENT
Start: 2019-06-14 | End: 2019-06-14

## 2019-06-14 RX ADMIN — PIPERACILLIN SODIUM,TAZOBACTAM SODIUM 3.38 G: 3; .375 INJECTION, POWDER, FOR SOLUTION INTRAVENOUS at 06:19

## 2019-06-14 RX ADMIN — LIDOCAINE HYDROCHLORIDE 10 ML: 10 INJECTION, SOLUTION EPIDURAL; INFILTRATION; INTRACAUDAL; PERINEURAL at 10:58

## 2019-06-14 RX ADMIN — VANCOMYCIN HYDROCHLORIDE 1500 MG: 5 INJECTION, POWDER, LYOPHILIZED, FOR SOLUTION INTRAVENOUS at 19:12

## 2019-06-14 RX ADMIN — SODIUM CHLORIDE: 9 INJECTION, SOLUTION INTRAVENOUS at 09:13

## 2019-06-14 RX ADMIN — THERA TABS 1 TABLET: TAB at 09:08

## 2019-06-14 RX ADMIN — LORAZEPAM 0.5 MG: 2 INJECTION INTRAMUSCULAR; INTRAVENOUS at 07:04

## 2019-06-14 RX ADMIN — PIPERACILLIN SODIUM,TAZOBACTAM SODIUM 3.38 G: 3; .375 INJECTION, POWDER, FOR SOLUTION INTRAVENOUS at 17:44

## 2019-06-14 RX ADMIN — FOLIC ACID 1 MG: 1 TABLET ORAL at 09:08

## 2019-06-14 RX ADMIN — PIPERACILLIN SODIUM,TAZOBACTAM SODIUM 3.38 G: 3; .375 INJECTION, POWDER, FOR SOLUTION INTRAVENOUS at 13:05

## 2019-06-14 ASSESSMENT — ACTIVITIES OF DAILY LIVING (ADL)
ADLS_ACUITY_SCORE: 24
ADLS_ACUITY_SCORE: 20

## 2019-06-14 ASSESSMENT — MIFFLIN-ST. JEOR: SCORE: 1992.71

## 2019-06-14 NOTE — PROGRESS NOTES
Paracentesis:     Pt tolerated well. VSS. 3600 cc red dlaia fluid removed from abdomen w/o difficulty. Bandaid applied to site - CDI.     Pt back to rm 804 per cart & transport. Detailed report called to RN.

## 2019-06-14 NOTE — PLAN OF CARE
Discharge Planner PT   Patient plan for discharge: unable to state  Current status: Pt in bed upon PT arrival, wife present. Pt is oriented only to self, quite confused. RN approved bed exercises only due to increased agitation and confusion. Pt was able to participate with AAROM of BLEs 6-8 reps, easily distracted, unable to follow verbal commands alone but able to participate when given tactile cues and one step verbal commands. Lymphedema eval has been ordered but pt currently has limited command following, unable to report on tolerance of wraps, increased agitation. Rec keeping RLE elevated in bed at this time and will continue to assess. Pt does have velcro lymph wraps at home but wife reports pt is not compliant. If pt's cognition and level of agitation improve, may be able to complete one session to assess lymph wrap size and tolerance for lymph wraps and then transition to nursing staff as lymphedema is chronic.  Barriers to return to prior living situation: level of assist required, confusion, weakness, impaired balance  Recommendations for discharge: TCU  Rationale for recommendations: Pt would benefit from PT at TCU to progress strength, balance and mobility so pt can return home safely.        Entered by: Zari De Leon 06/14/2019 3:47 PM

## 2019-06-14 NOTE — PLAN OF CARE
A&Ox1. VSS on RA. Denies pain. Repositioning q2h as patient allows. Tele A fib with CVR. DD2 diet with nectar thick liquids. Paracentesis at 1015 today, 3600 mL removed. Patient has very fragile skin, multiple mepilexes in place. Hospitalist spoke with wife today at length regarding goals of care, palliative consulted to see patient tomorrow or Monday morning. Patient can be agitated at time and may refuse cares and meals. PRN meds available.    [Follow-Up] : a follow-up visit [Sleep Apnea] : sleep apnea

## 2019-06-14 NOTE — PLAN OF CARE
Alert to self only, incoherent and garbled speech. VSS on RA. Tele afib RVR. Levin patent. Wound on spine CDIAbdomen distended r/t ascites, potential paracentesis 6/14. IVF @ 75. DD2/Nectar thick liquids, needs 1:1 supervision. Modified comfort cares, MD note specifies to not escalate cares. GI consult 6/14.

## 2019-06-14 NOTE — CONSULTS
SW Consult Note:    D/I:  Hospice Consult Acknowledged.  SW met with patient's spouse Helena to introduce self, role and to discuss hospice resources for patient.  Spouse was stating that she was recommended by Bartolome MCDANIEL that she needed to meet with Palliative first and that they were not ready for hospice at this time.  She stated that she was waiting to hear about his recovery first and that patient was currently having a paracentesis. She said that she will be leaving the hospital for a couple of hours today, but that her children will be around on Sunday afternoon and was requesting to meet with palliative at that time.  SW to follow up with Palliative and notify of spouse's availability for meeting and to remain available in supporting family in discharge/hospice planning as needed.     P: SW will remain available to assist for discharge/hospice planning.    Angus Sofia, CRISTOBAL, LGSW

## 2019-06-14 NOTE — PHARMACY-VANCOMYCIN DOSING SERVICE
Pharmacy Vancomycin Note  Date of Service 2019  Patient's  1945   74 year old, male    Indication: Bacteremia  Goal Trough Level: 15-20 mg/L  Day of Therapy: 3  Current Vancomycin regimen:  Intermittent, based on level    Current estimated CrCl = Estimated Creatinine Clearance: 37.5 mL/min (A) (based on SCr of 2.3 mg/dL (H)).    Creatinine for last 3 days  2019:  4:38 PM Creatinine 2.27 mg/dL  2019: 10:57 AM Creatinine 2.53 mg/dL  2019:  5:39 AM Creatinine 2.57 mg/dL  2019:  7:51 AM Creatinine 2.30 mg/dL    Recent Vancomycin Levels (past 3 days)  2019: 11:01 AM Vancomycin Level 12.4 mg/L  2019:  1:50 PM Vancomycin Level 18.8 mg/L    Vancomycin IV Administrations (past 72 hours)                   vancomycin 1500 mg in 0.9% NaCl 250 ml intermittent infusion 1,500 mg (mg) 1,500 mg Given 19 1352                Nephrotoxins and other renal medications (From now, onward)    Start     Dose/Rate Route Frequency Ordered Stop    19 1800  vancomycin 1500 mg in 0.9% NaCl 250 ml intermittent infusion 1,500 mg      1,500 mg  over 90 Minutes Intravenous ONCE 19 1432      19 0831  vancomycin place farmer - receiving intermittent dosing      1 each Does not apply SEE ADMIN INSTRUCTIONS 19 0831      19 0600  piperacillin-tazobactam (ZOSYN) 3.375 g vial to attach to  mL bag     Note to Pharmacy:  Pharmacy can adjust dose based on renal function.    3.375 g  over 30 Minutes Intravenous EVERY 6 HOURS 19 0530               Contrast Orders - past 72 hours (72h ago, onward)    Start     Dose/Rate Route Frequency Ordered Stop    19 1445  perflutren diluted 1mL to 2mL with saline (OPTISON) diluted injection 3 mL     Note to Pharmacy:  NDC 9164-6803-36    3 mL Intravenous ONCE 19 1441 19 1442          Interpretation of levels and current regimen:  Trough level is  Therapeutic    Has serum creatinine changed > 50% in last 72 hours:  No    Urine output:  diminished urine output    Renal Function: poor    Plan:  1.  redose later today with another 1.5gm X1  2.  Pharmacy will check trough levels as appropriate in 24 hrs if therapy continued..    3. Serum creatinine levels will be ordered daily for the first week of therapy and at least twice weekly for subsequent weeks.      Lynne Mancuso        .

## 2019-06-14 NOTE — PROGRESS NOTES
Palliative consult received. Reviewed case with Nathaniel MCDANIEL. It appears that although pt survived the immediate days after compassionate extubation, he is still in his dying process and would qualify for hospice. Wife reportedly still interested in pursuing a comfort plan of care, but will need assistance in hospice planning, and pt likely to need facility placement. Per discussion with Nathaniel, I will hold off on my consult for now and involve the care coordination team for hospice planning. Please do not hesitate to reach out to me if something changes. Thanks.    Cami QUESADA, [facility]  Palliative Medicine   Pager: [phone]

## 2019-06-14 NOTE — CONSULTS
GASTROENTEROLOGY CONSULTATION     Antonio Ramirez   [address]   [place] [address]   74 year old male   Admission Date/Time: 6/11/2019   Encounter Date: 6/14/2019  Primary Care Provider: Main Hardy     Referring / Attending Physician: Niki Rivera PA-C   We were asked to see the patient in consultation by Niki Rivera PA-C for evaluation of cirrhosis.     HPI: Antonio Ramirez is a 74 year old male with a complex past medical history including hypertension, hyperlipidemia, coronary artery disease status post CABG, alcoholic cirrhosis complicated by portal hypertension and ascites requiring multiple paracentesis, systolic congestive heart failure, valvular disease, paroxysmal atrial fibrillation on chronic anticoagulation with Xarelto, antiphospholipid antibody syndrome and a history of DVT and PE status post IVC filter placement as well as a history of MSSA bacteremia with a history of a left below-the-knee amputation, traumatic subdural hematoma.  The patient initially presented to the emergency department for evaluation of generalized weakness cough and vomiting.  He had noticed a general decline in his overall health over the last several months with several falls, impaired mobility and a subdural hematoma from a fall.  He had a paracentesis on the day of admission and developed chills.  On admission it was thought that he could have pneumonia.  A few hours after admission he was found apneic and pulseless and ultimately had a PEA cardiac arrest with CPR for about 4 minutes with return of spontaneous circulation.  He was transferred to the ICU and then became unresponsive and went into a VT arrest. He received 2 minutes of CPR with again return to spontaneous circulation.  The decision was made to compassionately extubate and surprisingly he was weaned to room air began having some improvement of cognitive function.  He was transferred out of the ICU yesterday.  He has continued to have  garbled speech but he has been able to follow some basic commands.  She has consulted for further evaluation of his recurrent ascites.  This morning the patient is seen with his wife.  The patient continues to have garbled speech that is nonsensical.  He denies abdominal pain.  His wife feels that his abdomen is becoming more firm and distended.  She tells me that her worst fear is that he will continue to improve and require a long-term stay in a nursing home.    Past Medical History  Past Medical History:   Diagnosis Date     Alcoholism (H)      Amputated left leg (H)      Anemia      Anticardiolipin antibody syndrome (H)      Antiphospholipid antibody syndrome (H)      Antiplatelet or antithrombotic long-term use      Aortic root dilatation (H)      Aortic stenosis      Arthritis      Balance problem      Coronary artery disease     CABG 2007: SVG to LAD, SVG to diagonal, SVG to OM; cardiac cath 5/17/18: thrombectomy for restenosis of stents-sent for urgent CABG, cath 5/14/2007: GRECIA x2 to LAD, Grecia to diagonal     Gastro-oesophageal reflux disease      Heart attack (H) 2007    apparently arrested, cardiac X5     Hiatal hernia      Hypercholesterolemia      Hypertension      Ischemic cardiomyopathy      Left foot drop      Liver disease     alcoholic liver disease, alcoholic cirrohsosis, portal hypertension     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)     ?When diagnosed, patient not aware of this     Moderate mitral regurgitation      Monoclonal gammopathy      Neuropathy      Noninfectious ileitis     diverticulitis of colon     Nonrheumatic tricuspid valve regurgitation      Paroxysmal atrial fibrillation (H)      PE (pulmonary embolism) 2006    saddle emboli 2006     Prostate cancer (H) 2000    Treated with radiation (seed implants in 2000) -- no problems since     Pulmonary hypertension (H)      Renal disease     kidney stones     Syncope      Thrombocytopenia (H)      Urethral stricture      Venous  thrombosis     IVC filter and lysis procedures 2007       Past Surgical History  Past Surgical History:   Procedure Laterality Date     AMPUTATE LEG BELOW KNEE Left 04/2014    related to gangrene, started as foot ulcer related to neuropathy     AMPUTATE LEG BELOW KNEE Left 12/4/2017    Procedure: AMPUTATE LEG BELOW KNEE;  Exploration of Left Leg Below Knee Amputation Stump with Ligation of Bleeders.;  Surgeon: Leandro Arreola MD;  Location:  OR     APPENDECTOMY       APPLY WOUND VAC Left 12/6/2017    Procedure: APPLY WOUND VAC;;  Surgeon: Saima Howard MD;  Location:  [facility]     ARTHROPLASTY HIP Right 11/27/2018    Procedure: RIGHT TOTAL HIP ARTHROPLASTY;  Surgeon: Saima Howard MD;  Location:  OR     ARTHROPLASTY KNEE Right 9/19/2014    Procedure: ARTHROPLASTY KNEE;  Surgeon: Saima Howard MD;  Location:  OR     CARDIAC SURGERY      CABG     CHOLECYSTECTOMY       COLONOSCOPY       COMBINED CYSTOSCOPY, RETROGRADES, EXCHANGE STENT URETER(S) Left 7/24/2018    Procedure: COMBINED CYSTOSCOPY, RETROGRADES, EXCHANGE STENT URETER(S);  CYSTOSCOPY, BILATERAL RETROGRADES, BILATERAL URETERAL STENT EXCHANGE ;  Surgeon: Matt Cervantes MD;  Location:  OR     CRANIOTOMY Right 4/7/2018    Procedure: CRANIOTOMY;  Right Craniotomy For Subdural Hematoma;  Surgeon: Jono Issa MD;  Location:  OR     CYSTOSCOPY, DILATE URETHRA, COMBINED N/A 10/12/2016    Procedure: COMBINED CYSTOSCOPY, DILATE URETHRA;  Surgeon: Dimitry Bello MD;  Location:  OR     CYSTOSCOPY, REMOVE STENT(S), COMBINED Right 7/24/2018    Procedure: COMBINED CYSTOSCOPY, REMOVE STENT(S);;  Surgeon: Jose Hunter MD;  Location:  OR     ENDOSCOPIC STRIPPING VEIN(S)       esophagogastroduodenoscopy       ESOPHAGOSCOPY, GASTROSCOPY, DUODENOSCOPY (EGD), COMBINED N/A 3/11/2019    Procedure: COMBINED ESOPHAGOSCOPY, GASTROSCOPY, DUODENOSCOPY (EGD), BIOPSY SINGLE OR MULTIPLE;  Surgeon: Katherin Boyd MD;   Location:  GI     EYE SURGERY      cataracts     GENITOURINARY SURGERY      Prostate Seen Implants     HERNIA REPAIR      Umbilical     INCISION AND DRAINAGE LOWER EXTREMITY, COMBINED Left 2017    Procedure: COMBINED INCISION AND DRAINAGE LOWER EXTREMITY;  INCISION AND DRAINAGE OF LEFT BELOW KNEE AMPUTATION ABSCESS, WOUND VAC PLACEMENT;  Surgeon: Saima Howard MD;  Location:  OR     IR IVC FILTER PLACEMENT       IRRIGATION AND DEBRIDEMENT LOWER EXTREMITY, COMBINED Left 2017    Procedure: COMBINED IRRIGATION AND DEBRIDEMENT LOWER EXTREMITY;  IRRIGATION AND DEBRIDEMENT OF LEFT BELOW KNEE AMPUTATION SITE WITH WOUND VAC REPLACEMENT;  Surgeon: Saima Howard MD;  Location:  [facility]     IRRIGATION AND DEBRIDEMENT LOWER EXTREMITY, COMBINED Left 2017    Procedure: COMBINED IRRIGATION AND DEBRIDEMENT LOWER EXTREMITY;  IRRIGATION AND DEBRIDEMENT OF LEFT BELOW THE KNEE AMPUTATION AND SHORTENING OF THE TIBIA WITH WOUND CLOSURE;  Surgeon: Saima Howard MD;  Location:  OR     LASER HOLMIUM LITHOTRIPSY URETER(S), INSERT STENT, COMBINED Bilateral 2018    Procedure: COMBINED CYSTOSCOPY, URETEROSCOPY, LASER HOLMIUM LITHOTRIPSY URETER(S), INSERT STENT;  CYSTOSCOPY, BILATERAL URETEROSCOPY,  HOLMIUM LASER LITHOTRIPSY, BILATERAL STENT PLACEMENT, STONE BASKETING;  Surgeon: Jose Hunter MD;  Location:  OR     LASER HOLMIUM LITHOTRIPSY URETER(S), INSERT STENT, COMBINED Left 2018    Procedure: COMBINED CYSTOSCOPY, URETEROSCOPY, LASER HOLMIUM LITHOTRIPSY URETER(S), INSERT STENT;  CYSTOSCOPY, LEFT URETEROSCOPY, LEFT LASER HOLMIUM LITHOTRIPSY URETER(S) REMOVAL BILATERAL STENTS;  Surgeon: Jose Hunter MD;  Location:  OR     ORTHOPEDIC SURGERY      (R) knee scope     ORTHOPEDIC SURGERY      total hip      ORTHOPEDIC SURGERY      elbow surgery       Family History  Family History   Problem Relation Age of Onset     Lung Cancer Mother      Heart Failure Father          age 87        Social History  Social History     Socioeconomic History     Marital status:      Spouse name: Not on file     Number of children: 2     Years of education: Not on file     Highest education level: Not on file   Occupational History     Occupation: Retired    Social Needs     Financial resource strain: Not on file     Food insecurity:     Worry: Not on file     Inability: Not on file     Transportation needs:     Medical: Not on file     Non-medical: Not on file   Tobacco Use     Smoking status: Never Smoker     Smokeless tobacco: Never Used   Substance and Sexual Activity     Alcohol use: Yes     Comment: quit 10/2015; now 3-4 Vodkas/night, 4/2019 abt 1-2 vodka's per night     Drug use: No     Sexual activity: Not Currently   Lifestyle     Physical activity:     Days per week: Not on file     Minutes per session: Not on file     Stress: Not on file   Relationships     Social connections:     Talks on phone: Not on file     Gets together: Not on file     Attends Zoroastrian service: Not on file     Active member of club or organization: Not on file     Attends meetings of clubs or organizations: Not on file     Relationship status: Not on file     Intimate partner violence:     Fear of current or ex partner: Not on file     Emotionally abused: Not on file     Physically abused: Not on file     Forced sexual activity: Not on file   Other Topics Concern     Parent/sibling w/ CABG, MI or angioplasty before 65F 55M? Not Asked   Social History Narrative    , 2 children, retired . He does not have a living will but desires full code.  (last updated 2/28/2019)        Medications  Prior to Admission medications    Medication Sig Start Date End Date Taking? Authorizing Provider   carvedilol (COREG) 3.125 MG tablet TAKE 1 TABLET BY MOUTH TWICE DAILY WITH MEALS 5/13/19   Main Hardy MD   cyanocobalamin (VITAMIN B-12) 500 MCG SUBL sublingual tablet Place 2 tablets (1,000 mcg) under  "the tongue daily  Patient not taking: Reported on 6/6/2019 3/5/19   William Shaffer MD   folic acid (FOLVITE) 1 MG tablet Take 1 tablet (1 mg) by mouth daily  Patient not taking: Reported on 6/6/2019 3/5/19   William Shaffer MD   gabapentin (NEURONTIN) 600 MG tablet Take 1 tablet (600 mg) by mouth 2 times daily 6/6/19   Main Hardy MD   HYDROcodone-acetaminophen (NORCO) 5-325 MG tablet Take 1 tablet by mouth every 6 hours as needed for pain (for left arm pain/LUE)    Reported, Patient   hydrocortisone (CORTAID) 1 % external cream Apply topically 2 times daily as needed    Reported, Patient   ipratropium (ATROVENT) 0.06 % nasal spray Spray 2 sprays into both nostrils 4 times daily 3/5/19   William Shaffer MD   melatonin 3 MG tablet Take 1 tablet (3 mg) by mouth nightly as needed for sleep 3/14/19   William Shaffer MD   multivitamin, therapeutic (THERA-VIT) TABS tablet Take 1 tablet by mouth daily  Patient not taking: Reported on 6/6/2019 3/5/19   William Shaffer MD   rivaroxaban ANTICOAGULANT (XARELTO) 20 MG TABS tablet Take 20 mg by mouth daily (with dinner)    Reported, Patient   senna-docusate (SENOKOT-S/PERICOLACE) 8.6-50 MG tablet Take 1 tablet by mouth daily as needed Hold for loose stools. 6/6/19   Main Hardy MD   spironolactone (ALDACTONE) 25 MG tablet TAKE 1/2 TABLET BY MOUTH DAILY 6/6/19   Main Hardy MD   torsemide (DEMADEX) 20 MG tablet Take 1 tablet (20 mg) by mouth 2 times daily 6/6/19   Main Hardy MD   vitamin B1 (THIAMINE) 100 MG tablet Take 1 tablet (100 mg) by mouth daily 3/5/19   William Shaffer MD       Allergies:  Ciprofloxacin and Hmg-coa-r inhibitors    ROS: A ten point review of systems was obtained and negative other than the symptoms noted above in the HPI.     Physical Exam:   /69 (BP Location: Left arm)   Pulse 50   Temp 98.4  F (36.9  C) (Oral)   Resp 16   Ht 1.88 m (6' 2\")   Wt 119.3 kg (263 lb)   SpO2 94%   BMI 33.77 kg/m     Constitutional: age appropriate, alert, no " acute distress  Cardiovascular: regular rate and rhythm, no murmurs,rubs or gallops  Respiratory: clear to auscultation bilaterally  Psychiatric: normal pleasant affect  Head: bruise on forehead  Neck: supple, no thyromegaly  ENT: mucous membranes are moist, no oral lesions are noted  Abdomen: soft, non-tender, mildly distended, normally active bowel sound. No masses or hepatosplenomegaly is appreciated. No rebound tenderness or guarding  Neuro: Neurologically intact grossly. No asterixis noted  Skin: warm, dry, no rashes are noted    ADDITIONAL COMMENTS:   I reviewed the patient's new clinical lab test results.   Recent Labs   Lab Test 06/14/19  0751 06/13/19  0539 06/12/19  1057   WBC 6.3 8.5 10.7   HGB 9.8* 9.6* 10.2*   MCV 95 98 95   * 119* 115*   INR 1.53* 1.55* 1.68*      Recent Labs   Lab Test 06/14/19 0751 06/13/19  0539 06/12/19  1057    143 139   POTASSIUM 3.9 4.6 5.0   CHLORIDE 114* 111* 109   CO2 22 26 24   BUN 43* 49* 50*   CR 2.30* 2.57* 2.53*   ANIONGAP 7 6 6   BENNIE 8.8 8.4* 8.3*      Recent Labs   Lab Test 06/14/19  0751 06/13/19  0539 06/12/19  1057  06/11/19  0220  03/03/19  1320 02/28/19  1635   ALBUMIN 2.2* 2.1* 2.1*   < >  --    < >  --   --    BILITOTAL 1.3 1.1 1.3   < >  --    < >  --   --    ALT 10 11 11   < >  --    < >  --   --    AST 20 22 25   < >  --    < >  --   --    ALKPHOS 65 62 72   < >  --    < >  --   --    PROTEIN  --   --   --   --  10*  --  10* 10*    < > = values in this interval not displayed.      I reviewed the patient's new imaging results.     Assessment: 74-year-old male with a history of alcoholic cirrhosis and continued alcohol use presenting with probable pneumonia complicated by a recent PEA cardiac arrest and then followed by VT arrest return to spontaneous rhythm after CPR.  He was compassionately extubated but surprisingly improved from a cognitive function standpoint.  He continues to have some neurological deficit but appears to be slowly improving.   His ascites does appear to be reaccumulating however we are limited in what we can do because of his acute kidney injury.  The patient's wife expressed concern regarding goals of care and his potential recovery which is quite reasonable.    Plan:   -Therapeutic paracentesis with albumin replacement  -Palliative care consultation to help determine goals of care/hospice?  -Unable to use diuretics at this time given his ANSELMO on CKD  -Diet as tolerated    I discussed the patient's findings and plan with Dr. Naina Lund who will also independently see and examine the patient.     Mark Cunningham PA-C  [facility]  Cell:  [phone] Monday through Friday [id number]  Office: [phone]

## 2019-06-14 NOTE — PLAN OF CARE
SLP: Attempted treatment session multiple times today - pt with other therapy initially, then asleep and unable to wake to functional level for PO and later receiving nursing cares. Will continue to follow per POC.

## 2019-06-14 NOTE — PROVIDER NOTIFICATION
MD Notification    Notified Person: MD    Notified Person Name: Claudia    Notification Date/Time: 6/14 0638    Notification Interaction: phone    Purpose of Notification: Increased agitation and aggression, no PRNs    Orders Received: Yes    Comments: One time Ativan 0.5 IV

## 2019-06-14 NOTE — PROGRESS NOTES
"Hospitalist update note    Spoke at length with Helena, patient's wife, with Sage's complex medical history and current events and admission. We are clear that patient would not want attempts to resuscitate or intubate; he would also not wish to be transferred to ICU nor receive vasopressors. Nasal cannula for comfort is reasonable. Helena does not wish for anticoagulation to be resumed at this time. Abx will continue for tonight and prn analgesia will be available. Helena says Sage never wanted to be in a NH nor a \"full care\" for his wife - she says he valued quality of life and did/does not want continued interventions, hospitalizations, etc if the outcome is futile to bring acceptable quality of life.     I will reach out to palliative care and ask that they see the patient either Saturday (tomorrow) or Monday. Helena (wife) says she is available midday tomorrow. I will be on all weekend and will continue to follow.     Discussed with SHIMON.     Mark Garcia MD  "

## 2019-06-14 NOTE — PLAN OF CARE
Pt transferred from ICU at 1700. Vitals stable. Tele- a fib w/ CVR. Orientated to self only, speech garbled. L BKA, up w/ lift, turn/repo q2h. DD2 diet w/ nectar thick liquids and needs 1:1 supervision, poor appetite for supper. Levin patent w/ good out put. Wound to mid spine, mepilex CDI. Abdomen distended, GI consulted, will likely need paracentesis tomorrow. Discharge plan pending.

## 2019-06-14 NOTE — PROGRESS NOTES
[facility]    Medicine Progress Note - Hospitalist Service       Date of Admission:  6/11/2019  Assessment & Plan   Mr. Antonio Ramirez is a 74 year old man with multiple complex past medical history of HTN, HLD, CAD s/p CABG, hx of alcoholic liver cirrhosis with portal HTN and ascites s/p multiple paracenteses, systolic CHF, valvular disease, paroxysmal Afib on chronic anticoagulation with Xarelto, antiphospholipid antibody syndrome with history of DVT and PE s/p IVC filter, hx MSSA bacteremia with hx Lt BKA, traumatic subdural hematoma, who was brought in for evaluation of generalized weakness, cough and vomiting.   He has had a general decline in his overall health in the last few months with multiple falls and severely impaired mobility, subdural hematoma in 2018 causing significant impairment in cognition, among others. He had a paracentesis the day prior to admission and developed chills and possible pneumonia resulting in admission to the hospital 6/10/19. A few hours after admission he was found apneic and pulseless ultimately with PEA cardiac arrest with CPR ~4 minutes prior to ROSC, thought primary respiratory cause. Transferred to ICU where he was unresponsive and a few hours later he went into VT arrest and received 2 minutes of CPR resulting in ROSC. He has slow been more interactive with increasing cognitive function.   - 6/13/19: weaned to room air, following basic commands and asking/answering questions with some comprehensible garbled speech, transfer out of ICU. Continue GOC discussion with family.      Goals of Care:   Continuing antibiotics and IVF while we wait to see how much recovery patient has from a neurologic standpoint.   - Family ok with abx, O2, IVF, however no escalation of cares, no transfer back to ICU at this time, DNR/DNI, not formally comfort care  - If respiratory worsens - NO INTUBATION, if requiring more than NC will need to speak with Wife  - Would like GI  evaluation and paracentesis when appropriate, hold Xarelto until decided about paracentesis  - Hold on Neurology and Nephrology consultations - reassess daily  - DNR/DNI   - 6/14 - awaiting discussion with wife when she returns this afternoon     Encephalopathy likely due to anoxia s/p Cardiac Arrest x2, 6/11/19  PEA cardiac arrest a few hours after admission, thought initially due to respiratory arrest. 1 round of CPR before achieving ROSC, and transfer to ICU. A few hours later the patient went into pulseless VT arrest and again received 1 round of CPR. He was terminally extubated later that day, see Code Blue and ICU notes for details. Baseline impaired cognitive function now with likely anoxic brain injury based on documented time without pulse as well as examination. Degree of anticipated impairment unclear but has prior brain injury that would make him more fragile.   Could not rule out seizure as a cause of his acute loss of consciousness, but no epileptiform activity on EEG, it did however show severe encephalopathy on 6/11/19.   Brain MRI 6/12/19: chronic subdural collection at Rt cerebral convexity since 2/2019. Diffuse cerebral volume loss. No acute intracranial pathology.  - Hold sedatives  - Judicious use of pain medications if needed  - has not been seen by Neuro formally, given arrest and now improvement in neurological function consultation deferred for now  - Continue PT/OT   - Reconsult SW when more appropriate     Mild/Moderate Oropharyngeal Dysphagia  - Consulted SLP - DD2/NTL, 1:1 supervision     Shock, septic vs cardiogenic   Community-acquired pneumonia versus aspiration pneumonia:    He presented with generalized weakness and coughing, which was nonproductive. Weakness is not really new and he called EMS 6 times in the last month for recurrent falls. Tmax 101.4 in ER.  CXR mentions mild right basilar atelectasis or pneumonia and Abd/Pelvic CT confirmed patchy infiltrates in the right middle  lobe and lingula, which might be pneumonia with bilateral pleural effusions and associated atelectasis. Procalcitonin 0.1 and WBC 10.2 on admit. Admitted to swallowing problems sometimes PTA, so aspiration pneumonia might be a possibility.   Given 1 dose of ceftriaxone and 1 dose of doxycycline in the ER. Given the fact that he had a paracentesis the day before admission on 06/10/2019, iatrogenic peritonitis cannot be completely excluded at this time.    BC day of admit growing staph simulans likely contaminant. Other Bcx neg.  - Continue antibiotics Zosyn and vancomycin  - Weaned to room air, O2 PRN  - family ok with abx, O2, no escalation of cares, no transfer back to ICU at this time, DNR/DNI, NOT formally comfort care     Alcoholic liver cirrhosis with recurrent ascites and portal hypertension.   Anasarca.   Alcohol abuse/dependence:    He continues to drink despite his advanced liver disease.  He had multiple paracenteses in the past, last paracentesis was done day PTA 06/10/2019 with 5.4 L of fluid removed. Presented with fever and generalized weakness, thought possibly related to pneumonia. He received 1 dose of ceftriaxone, 1 dose of doxycycline in ER.    He has been compliant with his torsemide and spironolactone.   EGD 2/2019 did not show esophageal varices, just portal hypertensive gastropathy.    - GI consulted on admission, not seen due to cardiac arrest and goals of care, now that pt's neurological statis is improving, d/w family and they would like GI evaluation for possible palliative paracentesis and opinion regarding diuresis once ANSELMO improved   - Zosyn/vanco as above, would also treat intraabdominal infxn  - Holding PTA torsemide 20 mg p.o. BID and Aldactone 25 mg p.o. Daily due to renal dysfunction     Hx antiphospholipid antibody syndrome:     Hx deep venous thrombosis and pulmonary embolism:    On chronic anticoagulation with Xarelto. Status post inferior vena cava filter in place.    - Xarelto  held on admission for repeat paracentesis, never restarted after cardiac arrest  - d/w family if they would like AC resumed, Brain MRI without changes to prior subdural - hold off until seen by GI and evaluating improvement   - awaiting further discussion with family today, wife returning this afternoon     Chronic systolic CHF with EF 40%-45%.   Severe aortic stenosis, moderate mitral regurgitation, moderate to severe tricuspid regurgitation.   ECHO 6/11/19: very difficult study, moderate aortic stenosis, mod decreased RV systolic function. RV moderately dilated, RA mod-severely dilated. Mild/mod MR and TR. EF 35-40%. Septal motion c/w conduction abnormality.   - Continue gentle IVF due to ANSELMO, will need to monitor closely to prevent fluid overload     CAD with history of 3-vessel coronary artery bypass grafting  Aortic stenosis, Thoracic aortic aneurysm without rupture   Hypertension, Dyslipidemia    - PTA Coreg 3.125mg PO BID on hold due to arrest and initial need for pressors after arrest. May be able to resume tomorrow.  - No ASA PTA due to Xarelto.   - Holding PTA Demadex 20mg PO BID and Aldactone     Paroxysmal atrial fibrillation:    On chronic anticoagulation with Xarelto PTA. On admit, pt in atrial fibrillation with rate control.   - Holding coreg and Xarelto as above       Peripheral neuropathy:  Hold PTA gabapentin pending neurological improvement      Anasarca, improved  Chronic Lymphedema  He does have significant lower extremity pitting edema on admission.  He uses lymphedema wrapping, although not very compliant.    - Lymphedema consult       Generalized weakness  Seems to be an ongoing problem for him.  He called 911 six times last month because of recurrent falls. Has Lt BKA. He uses a walker or a wheelchair at home.  He lives with his wife.    - PT/OT/SW      Pressure ulcer, POA  - Wound care and dressing changes per protocol     Alcohol abuse/dependence:    He reported drinking 4-5 ounces of  vodka daily.  Apparently, he did not have withdrawal symptoms in the past during his hospitalizations.    - Monitor for any withdrawal symptoms  - Seen by Psychiatry during his prior hospitalization, both at that time and on this admission the patient reported that he is not interested in quitting drinking, despite his advanced liver disease.      ANSELMO on CKD, stage III, worsening  His most recent creatinine level baseline was between 1.7 and 1.9. Worsening Cr trend.  Cr: 1.99 --> 2.05 --> 2.17 --> 2.27 --> 2.53 --> 2.57 --> 2.3 6/14  - Continue IVF  - Consult nephrology if family wants to continue with cares, resasses daily      Diet: Combination Diet Dysphagia Diet Level 2: Mechan Altered; Nectar Thickened Liquids (pre-thickened or use instant food thickener)    DVT Prophylaxis: PCD for now  Levin Catheter: in place, indication: Strict 1-2 Hour I&O  Code Status: DNR/DNI      Disposition Plan   Expected discharge: 2-4 days pending goals of care discussion and clinical course  Entered: Mark Garcia MD 06/14/2019, 11:59 AM       The patient's care was discussed with the Bedside Nurse and Patient.    Mark Garcia MD  Hospitalist Service  [facility]    ______________________________________________________________________    Interval History   No new events or complaints. Seems that he is more alert and awake today. Son at bedside. Pt is sometimes conversive but overall does have garbled and nonsensical speech at times.    Data reviewed today: I reviewed all medications, new labs and imaging results over the last 24 hours. I personally reviewed no images or EKG's today.    Physical Exam   Vital Signs: Temp: 98.4  F (36.9  C) Temp src: Oral BP: 122/66 Pulse: 50 Heart Rate: 64 Resp: 16 SpO2: 94 % O2 Device: None (Room air)    Weight: 263 lbs 0 oz    Gen: NAD, pleasant  HEENT: Normocephalic, EOMI, MMM  Resp: basilar crackles,  no wheezes, no increased work of resp  CV: S1S2 heard, reg rhythm, reg  rate, no pedal edema  Abdo: soft, nontender, nondistended, bowel sounds present  Ext: calves nontender, well perfused  Neuro: AAO times self, CN grossly intact, no facial asymmetry      Data   Recent Labs   Lab 06/14/19  0751 06/13/19  0539 06/12/19  1057  06/11/19  1029 06/11/19  0710   WBC 6.3 8.5 10.7  --  21.3* 9.7   HGB 9.8* 9.6* 10.2*   < > 10.7* 10.7*   MCV 95 98 95  --  94 95   * 119* 115*  --  160 159   INR 1.53* 1.55* 1.68*  --  1.70* 1.65*    143 139   < > 138 137   POTASSIUM 3.9 4.6 5.0   < > 5.2 5.0   CHLORIDE 114* 111* 109   < > 107 105   CO2 22 26 24   < > 27 24   BUN 43* 49* 50*   < > 46* 46*   CR 2.30* 2.57* 2.53*   < > 2.17* 2.05*   ANIONGAP 7 6 6   < > 4 8   BENNIE 8.8 8.4* 8.3*   < > 8.1* 8.2*   * 88 92   < > 107* 138*   ALBUMIN 2.2* 2.1* 2.1*   < > 2.4* 2.5*   PROTTOTAL 6.3* 6.0* 6.1*   < > 6.5* 6.7*   BILITOTAL 1.3 1.1 1.3   < > 1.2 0.9   ALKPHOS 65 62 72   < > 83 88   ALT 10 11 11   < > 12 11   AST 20 22 25   < > 28 27   TROPI  --   --   --   --  0.433* 0.027    < > = values in this interval not displayed.     Recent Results (from the past 24 hour(s))   US Paracentesis initial: high volume    Narrative    ULTRASOUND PARACENTESIS 6/14/2019 11:20 AM     HISTORY: Ascites. Therapeutic (high volume) paracentesis with fluid  analysis.    FINDINGS: Ultrasound was used to evaluate for the presence and best  approach for paracentesis. Written and oral informed consent was  obtained. A pause for the cause procedure to verify the correct  patient and correct procedure. The skin overlying the right lower  quadrant was prepped and draped in the usual sterile fashion. The  subcutaneous tissues were anesthetized with 10 mL 1% lidocaine. A  catheter was advanced into the peritoneal space and 3.6 L of  straw  colored fluid was drained. There were no immediate complications.  Ultrasound images were permanently stored.  Patient left the  ultrasound suite in satisfactory condition.       Impression    IMPRESSION: Technically successful paracentesis without immediate  complications.

## 2019-06-15 LAB
ALBUMIN SERPL-MCNC: 2.1 G/DL (ref 3.4–5)
ALP SERPL-CCNC: 60 U/L (ref 40–150)
ALT SERPL W P-5'-P-CCNC: 10 U/L (ref 0–70)
ANION GAP SERPL CALCULATED.3IONS-SCNC: 8 MMOL/L (ref 3–14)
AST SERPL W P-5'-P-CCNC: 24 U/L (ref 0–45)
BILIRUB SERPL-MCNC: 1.2 MG/DL (ref 0.2–1.3)
BUN SERPL-MCNC: 37 MG/DL (ref 7–30)
CALCIUM SERPL-MCNC: 8.6 MG/DL (ref 8.5–10.1)
CHLORIDE SERPL-SCNC: 117 MMOL/L (ref 94–109)
CO2 SERPL-SCNC: 22 MMOL/L (ref 20–32)
CREAT SERPL-MCNC: 1.93 MG/DL (ref 0.66–1.25)
ERYTHROCYTE [DISTWIDTH] IN BLOOD BY AUTOMATED COUNT: 16 % (ref 10–15)
GFR SERPL CREATININE-BSD FRML MDRD: 33 ML/MIN/{1.73_M2}
GLUCOSE SERPL-MCNC: 88 MG/DL (ref 70–99)
HCT VFR BLD AUTO: 30.3 % (ref 40–53)
HGB BLD-MCNC: 10 G/DL (ref 13.3–17.7)
INR PPP: 1.67 (ref 0.86–1.14)
MCH RBC QN AUTO: 31.3 PG (ref 26.5–33)
MCHC RBC AUTO-ENTMCNC: 33 G/DL (ref 31.5–36.5)
MCV RBC AUTO: 95 FL (ref 78–100)
PLATELET # BLD AUTO: 103 10E9/L (ref 150–450)
POTASSIUM SERPL-SCNC: 4 MMOL/L (ref 3.4–5.3)
PROT SERPL-MCNC: 6 G/DL (ref 6.8–8.8)
RBC # BLD AUTO: 3.19 10E12/L (ref 4.4–5.9)
SODIUM SERPL-SCNC: 147 MMOL/L (ref 133–144)
WBC # BLD AUTO: 6.6 10E9/L (ref 4–11)

## 2019-06-15 PROCEDURE — 25000132 ZZH RX MED GY IP 250 OP 250 PS 637: Mod: GY | Performed by: HOSPITALIST

## 2019-06-15 PROCEDURE — 25000128 H RX IP 250 OP 636: Performed by: PHYSICIAN ASSISTANT

## 2019-06-15 PROCEDURE — A9270 NON-COVERED ITEM OR SERVICE: HCPCS | Mod: GY | Performed by: PHYSICIAN ASSISTANT

## 2019-06-15 PROCEDURE — 12000000 ZZH R&B MED SURG/OB

## 2019-06-15 PROCEDURE — 99232 SBSQ HOSP IP/OBS MODERATE 35: CPT | Performed by: HOSPITALIST

## 2019-06-15 PROCEDURE — A9270 NON-COVERED ITEM OR SERVICE: HCPCS | Mod: GY | Performed by: HOSPITALIST

## 2019-06-15 PROCEDURE — 36415 COLL VENOUS BLD VENIPUNCTURE: CPT | Performed by: PHYSICIAN ASSISTANT

## 2019-06-15 PROCEDURE — 80053 COMPREHEN METABOLIC PANEL: CPT | Performed by: PHYSICIAN ASSISTANT

## 2019-06-15 PROCEDURE — 85027 COMPLETE CBC AUTOMATED: CPT | Performed by: PHYSICIAN ASSISTANT

## 2019-06-15 PROCEDURE — 99223 1ST HOSP IP/OBS HIGH 75: CPT | Performed by: INTERNAL MEDICINE

## 2019-06-15 PROCEDURE — 85610 PROTHROMBIN TIME: CPT | Performed by: PHYSICIAN ASSISTANT

## 2019-06-15 PROCEDURE — 25000132 ZZH RX MED GY IP 250 OP 250 PS 637: Mod: GY | Performed by: PHYSICIAN ASSISTANT

## 2019-06-15 RX ORDER — GABAPENTIN 300 MG/1
300 CAPSULE ORAL 2 TIMES DAILY
Status: DISCONTINUED | OUTPATIENT
Start: 2019-06-15 | End: 2019-06-19 | Stop reason: HOSPADM

## 2019-06-15 RX ADMIN — PIPERACILLIN SODIUM,TAZOBACTAM SODIUM 3.38 G: 3; .375 INJECTION, POWDER, FOR SOLUTION INTRAVENOUS at 12:27

## 2019-06-15 RX ADMIN — THERA TABS 1 TABLET: TAB at 09:31

## 2019-06-15 RX ADMIN — PIPERACILLIN SODIUM,TAZOBACTAM SODIUM 3.38 G: 3; .375 INJECTION, POWDER, FOR SOLUTION INTRAVENOUS at 23:46

## 2019-06-15 RX ADMIN — FOLIC ACID 1 MG: 1 TABLET ORAL at 09:31

## 2019-06-15 RX ADMIN — PIPERACILLIN SODIUM,TAZOBACTAM SODIUM 3.38 G: 3; .375 INJECTION, POWDER, FOR SOLUTION INTRAVENOUS at 18:28

## 2019-06-15 RX ADMIN — Medication 1 LOZENGE: at 23:40

## 2019-06-15 RX ADMIN — PIPERACILLIN SODIUM,TAZOBACTAM SODIUM 3.38 G: 3; .375 INJECTION, POWDER, FOR SOLUTION INTRAVENOUS at 00:42

## 2019-06-15 RX ADMIN — GABAPENTIN 300 MG: 300 CAPSULE ORAL at 21:11

## 2019-06-15 RX ADMIN — GUAIFENESIN 10 ML: 200 SOLUTION ORAL at 23:46

## 2019-06-15 RX ADMIN — PIPERACILLIN SODIUM,TAZOBACTAM SODIUM 3.38 G: 3; .375 INJECTION, POWDER, FOR SOLUTION INTRAVENOUS at 05:10

## 2019-06-15 ASSESSMENT — ACTIVITIES OF DAILY LIVING (ADL)
ADLS_ACUITY_SCORE: 20
ADLS_ACUITY_SCORE: 22
ADLS_ACUITY_SCORE: 20
ADLS_ACUITY_SCORE: 22
ADLS_ACUITY_SCORE: 20
ADLS_ACUITY_SCORE: 22

## 2019-06-15 ASSESSMENT — MIFFLIN-ST. JEOR: SCORE: 1994.75

## 2019-06-15 NOTE — PLAN OF CARE
A&Ox1. VSS on RA. Denies pain. Repositioning q2h. LS diminished. +2-3 generalized edema and BLE. Stump pink/red. Dry skin. DD2 nectar thick diet. Plan for palliative to see pt today or Sunday. Continue to monitor.

## 2019-06-15 NOTE — PROGRESS NOTES
[facility]    Medicine Progress Note - Hospitalist Service       Date of Admission:  6/11/2019  Assessment & Plan      Mr. Antonio Ramirez is a 74 year old man with multiple complex past medical history of HTN, HLD, CAD s/p CABG, hx of alcoholic liver cirrhosis with portal HTN and ascites s/p multiple paracenteses, systolic CHF, valvular disease, paroxysmal Afib on chronic anticoagulation with Xarelto, antiphospholipid antibody syndrome with history of DVT and PE s/p IVC filter, hx MSSA bacteremia with hx Lt BKA, traumatic subdural hematoma, who was brought in for evaluation of generalized weakness, cough and vomiting.   He has had a general decline in his overall health in the last few months with multiple falls and severely impaired mobility, subdural hematoma in 2018 causing significant impairment in cognition, among others. He had a paracentesis the day prior to admission and developed chills and possible pneumonia resulting in admission to the hospital 6/10/19. A few hours after admission he was found apneic and pulseless ultimately with PEA cardiac arrest with CPR ~4 minutes prior to ROSC, thought primary respiratory cause. Transferred to ICU where he was unresponsive and a few hours later he went into VT arrest and received 2 minutes of CPR resulting in ROSC. He has slow been more interactive with increasing cognitive function.   - 6/13/19: weaned to room air, following basic commands and asking/answering questions with some comprehensible garbled speech, transfer out of ICU. Continue GOC discussion with family.      Goals of Care:   Continuing antibiotics and IVF while we wait to see how much recovery patient has from a neurologic standpoint.   - Family ok with abx, O2, IVF, however no escalation of cares, no transfer back to ICU at this time, DNR/DNI, not formally comfort care  - If respiratory worsens - NO INTUBATION, if requiring more than NC will need to speak with Wife  - Would like GI  evaluation and paracentesis when appropriate, hold Xarelto until decided about paracentesis  - Hold on Neurology and Nephrology consultations - reassess daily  - DNR/DNI   - 6/14 had lengthy talk with wife regarding overall picture, she was appreciative and realistic with current issues and with unclear timeline  - 6/15 Palliative meeting with family, appreciated     Encephalopathy likely due to anoxia s/p Cardiac Arrest x2, 6/11/19  PEA cardiac arrest a few hours after admission, thought initially due to respiratory arrest. 1 round of CPR before achieving ROSC, and transfer to ICU. A few hours later the patient went into pulseless VT arrest and again received 1 round of CPR. He was terminally extubated later that day, see Code Blue and ICU notes for details. Baseline impaired cognitive function now with likely anoxic brain injury based on documented time without pulse as well as examination. Degree of anticipated impairment unclear but has prior brain injury that would make him more fragile.   Could not rule out seizure as a cause of his acute loss of consciousness, but no epileptiform activity on EEG, it did however show severe encephalopathy on 6/11/19.   Brain MRI 6/12/19: chronic subdural collection at Rt cerebral convexity since 2/2019. Diffuse cerebral volume loss. No acute intracranial pathology.  - Hold sedatives  - Judicious use of pain medications if needed  - has not been seen by Neuro formally, given arrest and now improvement in neurological function consultation deferred for now  - Continue PT/OT   - Reconsult SW when more appropriate     Mild/Moderate Oropharyngeal Dysphagia  - Consulted SLP - DD2/NTL, 1:1 supervision     Shock, septic vs cardiogenic   Community-acquired pneumonia versus aspiration pneumonia:    He presented with generalized weakness and coughing, which was nonproductive. Weakness is not really new and he called EMS 6 times in the last month for recurrent falls. Tmax 101.4 in ER.   CXR mentions mild right basilar atelectasis or pneumonia and Abd/Pelvic CT confirmed patchy infiltrates in the right middle lobe and lingula, which might be pneumonia with bilateral pleural effusions and associated atelectasis. Procalcitonin 0.1 and WBC 10.2 on admit. Admitted to swallowing problems sometimes PTA, so aspiration pneumonia might be a possibility.   Given 1 dose of ceftriaxone and 1 dose of doxycycline in the ER. Given the fact that he had a paracentesis the day before admission on 06/10/2019, iatrogenic peritonitis cannot be completely excluded at this time.    BC day of admit growing staph simulans likely contaminant. Other Bcx neg.  - Continue antibiotics Zosyn and vancomycin  - Weaned to room air, O2 PRN  - family ok with abx, O2, no escalation of cares, no transfer back to ICU at this time, DNR/DNI, NOT formally comfort care     Alcoholic liver cirrhosis with recurrent ascites and portal hypertension.   Anasarca.   Alcohol abuse/dependence:    He continues to drink despite his advanced liver disease.  He had multiple paracenteses in the past, last paracentesis was done day PTA 06/10/2019 with 5.4 L of fluid removed. Presented with fever and generalized weakness, thought possibly related to pneumonia. He received 1 dose of ceftriaxone, 1 dose of doxycycline in ER.    He has been compliant with his torsemide and spironolactone.   EGD 2/2019 did not show esophageal varices, just portal hypertensive gastropathy.    - GI consulted on admission, not seen due to cardiac arrest and goals of care, now that pt's neurological statis is improving, d/w family and they would like GI evaluation for possible palliative paracentesis and opinion regarding diuresis once ANSELMO improved   - Zosyn/vanco as above, would also treat intraabdominal infxn  - Holding PTA torsemide 20 mg p.o. BID and Aldactone 25 mg p.o. Daily due to renal dysfunction     Hx antiphospholipid antibody syndrome:     Hx deep venous thrombosis and  pulmonary embolism:    On chronic anticoagulation with Xarelto. Status post inferior vena cava filter in place.    - Xarelto held on admission for repeat paracentesis, never restarted after cardiac arrest  - d/w family if they would like AC resumed, Brain MRI without changes to prior subdural - hold off until seen by GI and evaluating improvement   - wife wishes to not resume anticoagulation as of 6/14     Chronic systolic CHF with EF 40%-45%.   Severe aortic stenosis, moderate mitral regurgitation, moderate to severe tricuspid regurgitation.   ECHO 6/11/19: very difficult study, moderate aortic stenosis, mod decreased RV systolic function. RV moderately dilated, RA mod-severely dilated. Mild/mod MR and TR. EF 35-40%. Septal motion c/w conduction abnormality.   - Previously given gentle IVF due to ANSELMO, will need to monitor closely to prevent fluid overload     CAD with history of 3-vessel coronary artery bypass grafting  Aortic stenosis, Thoracic aortic aneurysm without rupture   Hypertension, Dyslipidemia    - PTA Coreg 3.125mg PO BID on hold due to arrest and initial need for pressors after arrest. May be able to resume tomorrow.  - No ASA PTA due to Xarelto.   - Holding PTA Demadex 20mg PO BID and Aldactone     Paroxysmal atrial fibrillation:    On chronic anticoagulation with Xarelto PTA. On admit, pt in atrial fibrillation with rate control.   - Holding coreg and Xarelto as above  - discussed with wife     Peripheral neuropathy:    - Hold PTA gabapentin pending neurological improvement      Anasarca, improved  Chronic Lymphedema  He does have significant lower extremity pitting edema on admission.  He uses lymphedema wrapping, although not very compliant.    - Lymphedema consult       Generalized weakness  Seems to be an ongoing problem for him.  He called 911 six times last month because of recurrent falls. Has Lt BKA. He uses a walker or a wheelchair at home.  He lives with his wife.    -  PT/OT/SW      Pressure ulcer, POA  - Wound care and dressing changes per protocol     Alcohol abuse/dependence:    He reported drinking 4-5 ounces of vodka daily.  Apparently, he did not have withdrawal symptoms in the past during his hospitalizations.    - Monitor for any withdrawal symptoms  - Seen by Psychiatry during his prior hospitalization, both at that time and on this admission the patient reported that he is not interested in quitting drinking, despite his advanced liver disease.      ANSELMO on CKD, stage III, worsening  His most recent creatinine level baseline was between 1.7 and 1.9. Worsening Cr trend.  Cr: 1.99 --> 2.05 --> 2.17 --> 2.27 --> 2.53 --> 2.57 --> 2.3 6/14 --> 1.9 6/15  - Continue IVF - off for now, given his other comorbidities, want to avoid overload, encourage PO intake - discussed with wife who agrees  - Consult nephrology if family wants to continue with cares, resasses daily      Diet: Combination Diet Dysphagia Diet Level 2: Mechan Altered; Nectar Thickened Liquids (pre-thickened or use instant food thickener)    DVT Prophylaxis: Pneumatic Compression Devices  Levin Catheter: in place, indication: Strict 1-2 Hour I&O  Code Status: DNR/DNI      Disposition Plan   Expected discharge: 2-3 days, pending clinical course and palliative meeting  Entered: Mark Garcia MD 06/15/2019, 1:57 PM       The patient's care was discussed with the Bedside Nurse, Care Coordinator/, Patient, Patient's Family and Palliative Team.    Mark Garcia MD  Hospitalist Service  [facility]    ______________________________________________________________________    Interval History   No new events or complaints overnight or this morning. Patient actually looks better and is more awake and alert than yesterday. Does not recall much of the last several days, not surprisingly. Wife at bedside and thinks he is a bit better but still confused at times, clearly. I explained to him  that he actually  a few days ago in explaining the cause of his ongoing rib cage soreness on movement and palpation after having CPR. He has no new pain or SOB, no f/c. Awaiting palliative meeting.     Data reviewed today: I reviewed all medications, new labs and imaging results over the last 24 hours. I personally reviewed no images or EKG's today.    Physical Exam   Vital Signs: Temp: 97.2  F (36.2  C) Temp src: Axillary BP: 139/67 Pulse: 70 Heart Rate: 67 Resp: 16 SpO2: 93 % O2 Device: None (Room air)    Weight: 263 lbs 7.2 oz    Gen: NAD, pleasant  HEENT: Normocephalic, EOMI, MMM  Resp: a few basilar crackles,  no wheezes, no increased work of resp  CV: S1S2 heard, reg rhythm, reg rate, trace pedal edema  Abdo: soft, full and slightly distended, not firm or rigid, no rebound, ascitic fluid appears to be reaccum  Ext: calves nontender, well perfused  Neuro: AAOx person, year at times; CN grossly intact, moving extremities at will      Data   Recent Labs   Lab 06/15/19  0651 19  0751 19  0539  19  1029 19  0710   WBC 6.6 6.3 8.5   < > 21.3* 9.7   HGB 10.0* 9.8* 9.6*   < > 10.7* 10.7*   MCV 95 95 98   < > 94 95   * 112* 119*   < > 160 159   INR 1.67* 1.53* 1.55*   < > 1.70* 1.65*   * 143 143   < > 138 137   POTASSIUM 4.0 3.9 4.6   < > 5.2 5.0   CHLORIDE 117* 114* 111*   < > 107 105   CO2 22 22 26   < > 27 24   BUN 37* 43* 49*   < > 46* 46*   CR 1.93* 2.30* 2.57*   < > 2.17* 2.05*   ANIONGAP 8 7 6   < > 4 8   BENNIE 8.6 8.8 8.4*   < > 8.1* 8.2*   GLC 88 101* 88   < > 107* 138*   ALBUMIN 2.1* 2.2* 2.1*   < > 2.4* 2.5*   PROTTOTAL 6.0* 6.3* 6.0*   < > 6.5* 6.7*   BILITOTAL 1.2 1.3 1.1   < > 1.2 0.9   ALKPHOS 60 65 62   < > 83 88   ALT 10 10 11   < > 12 11   AST 24 20 22   < > 28 27   TROPI  --   --   --   --  0.433* 0.027    < > = values in this interval not displayed.     No results found for this or any previous visit (from the past 24 hour(s)).

## 2019-06-15 NOTE — PLAN OF CARE
A&Ox1. Lethargic and sleeping most of this shift. VSS on RA. Denies pain. Repositioning q2h. LS diminished. +2-3 generalized edema and BLE. Stump pink/red. Dry skin. DD2 nectar thick diet, poor appetite.  IVF and tele discontinued. Levin patent with dalia colored output. BS active, passing flatus, incontinent of 1 soft BM this shift. Palliative met with family today, see note. Plan pending.

## 2019-06-15 NOTE — CONSULTS
[facility]    Palliative Care Consultation     Antonio Ramirez  MRN# [id number]  Date of Admission:  6/11/2019  Date of Service (when I saw the patient): 06/15/19  Reason for consult: Consulted by Dr. Garcia for Goals of care    Assessment & Plan   Mr. Antonio Ramirez is a 74 year old man with multiple complex past medical history of HTN, CAD s/p CABG, hx of alcoholic liver cirrhosis with portal HTN and ascites s/p multiple paracenteses, systolic CHF, valvular disease, paroxysmal Afib on chronic anticoagulation, antiphospholipid antibody syndrome with history of DVT and PE s/p IVC filter, hx MSSA bacteremia with hx Lt BKA, traumatic subdural hematoma in 2018 causing sig cognitive decline and has had a generalized decline in his overall health in the past few months with multiple falls and severely impaired mobility and generalized weakness. He had a paracentesis the day prior to admission and developed increased weakness, cough, chills and possible pneumonia resulting in admission to the hospital on 6/11/19. A few hours after admission he was found apneic and pulseless ultimately with PEA cardiac arrest with CPR ~4 minutes prior to ROSC. Transferred to ICU where he was unresponsive and a few hours later he went into VT arrest and received 2 minutes of CPR resulting in ROSC. Since then, he has slowly been more interactive with increasing cognitive function. On 6/13/19 he was weaned to room air, following basic commands and asking/answering questions with some comprehensible garbled speech, transfer out of ICU. Sage is able to communicate some basic needs and can recognize his wife but not oriented to place and intermittently delirious and without capacity.      Discussed our potential roles for symptom management, support/coping, and decisional support (aka goals of care).     Goals of care discussions:   I met today with patient's wife, Helena, son and daughter.  We talked about Sage's complex past  "medical history, his quality of life over the past month prior to this hospitalization as well is over the past 6 months prior to this hospitalization and we talked about what has been going on since this hospitalization. Sage's family has seen him make \"miraculous\" recoveries, most recently during this hospitalization when they were under the impression that he would die after extubation and now he is recognizing his family and having some minimal interactions with them.  I explained that I thought Sage was dying and that the chances of him being able to return to the baseline that he was at prior to this hospitalization was very slim.  I also talked about how even if Sage's cognitive ability returns, the possibility of him making enough recovery and rehab to be able to return home with some independence was also very slim and I did not think he would ever gain back the minimal independence he had prior to hospitalization (using a walker to walk in the house, going to the bathroom on his own).  We talked about how in the month prior to this hospitalization Sage did not feel he had a good quality of life and how he would never want to live the rest of his life in a nursing home, being cared for 24/7.  Helena told me that prior to this hospitalization Sage unfortunately had stool incontinence when he tried to walk to the bathroom but was not able to make it there.  This caused Sage a lot of shame and embarrassment.  We talked about the different path Sage could take when he was ready for discharge from the hospital.  Those options being rehab / nursing home facility and continue to to pursue life-prolonging measures versus discharge with hospice.  Because the family has seen him make significant recoveries in the past, they wanted to see how he did over the next few days before making a final decision of hospice versus rehab/nursing home on discharge.  I believe the family is hoping that Sage will make enough of a recovery in " his cognitive ability to be able to participate in goals of care discussions.  The family did agree and reiterate that Sage was DNR/DNI and that they did not want to escalate any cares so that if he was dying to allow a natural death and not transfer him to the intensive care unit.  -DNR/DNI  -No escalation of cares\no transfer to the ICU  -Continue all current cares  -Plan to meet with patient's wife and children again on Monday to make a more definite plan moving forward  -Monday will need to again discuss need for a paracentesis and possible placement of a pleurX catheter so that he does not need to return for paracentesis once discharged (I mentioned this to family but put issue on hold until more definitive goals of care decisions were made).     Symptom Recommendations:     - Restart gabapentin for neuropathy but will restart at lower dose than admission dose of 600mg BID and follow renal fxn.   - continue to watch for EtOH withdrawal as patient with recent ETOH use. 3 liters of vodka a week.     Support/Coping    -Will involve Palliative LICSW     Decisional Support, Goals of Care, Counseling & Coordination  Decisional Capacity Intact?  -no  Health Care Directive on File?  -no  Code Status/Resuscitation Preferences?  -DNR/DNI       Thank you for involving us in the care of this patient and family. We will continue to follow at this time. Please do not hesitate to contact me with questions or concerns or the on-call provider for our team if evening or weekend.    Marie Woo  Palliative Care Physician  Pager [phone]      Attestation:  Total time on the floor involved in the patient's care: 90 minutes  Total time spent in counseling/care coordination: >50%    Chief Complaint   Goals of care    History is obtained from the patient's wife, son and daughter and extensive chart review.     Mr. Antonio Ramirez is a 74 year old man with multiple complex past medical history of HTN, CAD s/p CABG, hx of  alcoholic liver cirrhosis with portal HTN and ascites s/p multiple paracenteses, systolic CHF, valvular disease, paroxysmal Afib on chronic anticoagulation, antiphospholipid antibody syndrome with history of DVT and PE s/p IVC filter, hx MSSA bacteremia with hx Lt BKA, traumatic subdural hematoma in 2018 causing sig cognitive decline and has had a generalized decline in his overall health in the past few months with multiple falls and severely impaired mobility and generalized weakness,  He had a paracentesis the day prior to admission and developed chills and possible pneumonia resulting in admission to the hospital 6/10/19. A few hours after admission he was found apneic and pulseless ultimately with PEA cardiac arrest with CPR ~4 minutes prior to ROSC. Transferred to ICU where he was unresponsive and a few hours later he went into VT arrest and received 2 minutes of CPR resulting in ROSC. Since then, he has slowly been more interactive with increasing cognitive function. On 6/13/19 he was weaned to room air, following basic commands and asking/answering questions with some comprehensible garbled speech, transfer out of ICU.       Past Medical History    I have reviewed this patient's medical history and updated it with pertinent information if needed.   Past Medical History:   Diagnosis Date     Alcoholism (H)      Amputated left leg (H)      Anemia      Anticardiolipin antibody syndrome (H)      Antiphospholipid antibody syndrome (H)      Antiplatelet or antithrombotic long-term use      Aortic root dilatation (H)      Aortic stenosis      Arthritis      Balance problem      Coronary artery disease     CABG 2007: SVG to LAD, SVG to diagonal, SVG to OM; cardiac cath 5/17/18: thrombectomy for restenosis of stents-sent for urgent CABG, cath 5/14/2007: GRECIA x2 to LAD, Grecia to diagonal     Gastro-oesophageal reflux disease      Heart attack (H) 2007    apparently arrested, cardiac X5     Hiatal hernia       Hypercholesterolemia      Hypertension      Ischemic cardiomyopathy      Left foot drop      Liver disease     alcoholic liver disease, alcoholic cirrohsosis, portal hypertension     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)     ?When diagnosed, patient not aware of this     Moderate mitral regurgitation      Monoclonal gammopathy      Neuropathy      Noninfectious ileitis     diverticulitis of colon     Nonrheumatic tricuspid valve regurgitation      Paroxysmal atrial fibrillation (H)      PE (pulmonary embolism) 2006    saddle emboli 2006     Prostate cancer (H) 2000    Treated with radiation (seed implants in 2000) -- no problems since     Pulmonary hypertension (H)      Renal disease     kidney stones     Syncope      Thrombocytopenia (H)      Urethral stricture      Venous thrombosis     IVC filter and lysis procedures 2007       Past Surgical History   I have reviewed this patient's surgical history and updated it with pertinent information if needed.  Past Surgical History:   Procedure Laterality Date     AMPUTATE LEG BELOW KNEE Left 04/2014    related to gangrene, started as foot ulcer related to neuropathy     AMPUTATE LEG BELOW KNEE Left 12/4/2017    Procedure: AMPUTATE LEG BELOW KNEE;  Exploration of Left Leg Below Knee Amputation Stump with Ligation of Bleeders.;  Surgeon: Leandro Arreola MD;  Location:  OR     APPENDECTOMY       APPLY WOUND VAC Left 12/6/2017    Procedure: APPLY WOUND VAC;;  Surgeon: Saima Howard MD;  Location:  SD     ARTHROPLASTY HIP Right 11/27/2018    Procedure: RIGHT TOTAL HIP ARTHROPLASTY;  Surgeon: Saima Howard MD;  Location:  OR     ARTHROPLASTY KNEE Right 9/19/2014    Procedure: ARTHROPLASTY KNEE;  Surgeon: Saima Howard MD;  Location:  OR     CARDIAC SURGERY      CABG     CHOLECYSTECTOMY       COLONOSCOPY       COMBINED CYSTOSCOPY, RETROGRADES, EXCHANGE STENT URETER(S) Left 7/24/2018    Procedure: COMBINED CYSTOSCOPY, RETROGRADES, EXCHANGE  STENT URETER(S);  CYSTOSCOPY, BILATERAL RETROGRADES, BILATERAL URETERAL STENT EXCHANGE ;  Surgeon: Matt Cervantes MD;  Location:  OR     CRANIOTOMY Right 4/7/2018    Procedure: CRANIOTOMY;  Right Craniotomy For Subdural Hematoma;  Surgeon: Jono Issa MD;  Location:  OR     CYSTOSCOPY, DILATE URETHRA, COMBINED N/A 10/12/2016    Procedure: COMBINED CYSTOSCOPY, DILATE URETHRA;  Surgeon: Dimitry Bello MD;  Location:  OR     CYSTOSCOPY, REMOVE STENT(S), COMBINED Right 7/24/2018    Procedure: COMBINED CYSTOSCOPY, REMOVE STENT(S);;  Surgeon: Jose Hunter MD;  Location:  OR     ENDOSCOPIC STRIPPING VEIN(S)       esophagogastroduodenoscopy       ESOPHAGOSCOPY, GASTROSCOPY, DUODENOSCOPY (EGD), COMBINED N/A 3/11/2019    Procedure: COMBINED ESOPHAGOSCOPY, GASTROSCOPY, DUODENOSCOPY (EGD), BIOPSY SINGLE OR MULTIPLE;  Surgeon: Katherin Boyd MD;  Location:  GI     EYE SURGERY      cataracts     GENITOURINARY SURGERY      Prostate Seen Implants     HERNIA REPAIR      Umbilical     INCISION AND DRAINAGE LOWER EXTREMITY, COMBINED Left 12/1/2017    Procedure: COMBINED INCISION AND DRAINAGE LOWER EXTREMITY;  INCISION AND DRAINAGE OF LEFT BELOW KNEE AMPUTATION ABSCESS, WOUND VAC PLACEMENT;  Surgeon: Saima Howard MD;  Location:  OR     IR IVC FILTER PLACEMENT       IRRIGATION AND DEBRIDEMENT LOWER EXTREMITY, COMBINED Left 12/6/2017    Procedure: COMBINED IRRIGATION AND DEBRIDEMENT LOWER EXTREMITY;  IRRIGATION AND DEBRIDEMENT OF LEFT BELOW KNEE AMPUTATION SITE WITH WOUND VAC REPLACEMENT;  Surgeon: Saima Howard MD;  Location:  [facility]     IRRIGATION AND DEBRIDEMENT LOWER EXTREMITY, COMBINED Left 12/8/2017    Procedure: COMBINED IRRIGATION AND DEBRIDEMENT LOWER EXTREMITY;  IRRIGATION AND DEBRIDEMENT OF LEFT BELOW THE KNEE AMPUTATION AND SHORTENING OF THE TIBIA WITH WOUND CLOSURE;  Surgeon: Saima Howard MD;  Location:  OR     LASER HOLMIUM LITHOTRIPSY URETER(S),  INSERT STENT, COMBINED Bilateral 6/28/2018    Procedure: COMBINED CYSTOSCOPY, URETEROSCOPY, LASER HOLMIUM LITHOTRIPSY URETER(S), INSERT STENT;  CYSTOSCOPY, BILATERAL URETEROSCOPY,  HOLMIUM LASER LITHOTRIPSY, BILATERAL STENT PLACEMENT, STONE BASKETING;  Surgeon: Jose Hunter MD;  Location:  OR     LASER HOLMIUM LITHOTRIPSY URETER(S), INSERT STENT, COMBINED Left 8/14/2018    Procedure: COMBINED CYSTOSCOPY, URETEROSCOPY, LASER HOLMIUM LITHOTRIPSY URETER(S), INSERT STENT;  CYSTOSCOPY, LEFT URETEROSCOPY, LEFT LASER HOLMIUM LITHOTRIPSY URETER(S) REMOVAL BILATERAL STENTS;  Surgeon: Jose Hunter MD;  Location:  OR     ORTHOPEDIC SURGERY      (R) knee scope     ORTHOPEDIC SURGERY      total hip      ORTHOPEDIC SURGERY      elbow surgery       Social History   Living situation: lives with his wife in their home. Was in [facility] from mid March to mid April, 2019 after hospitalization for bacteremia. Son and daughter live near.     Family system: son, daughter, 2 grandkids ages 12 and 14. He does not leave house anymore. Can use walker to walk from bathroom to bedroom but has to rest several times along the way.     Self-identified support system: wife only form of support. He no longer leaves house and doesn't really want to be around people anymore.     Activities/interests: was an avid reader, used to love to have indebt discussions. Now just watches tv. No interest in books.  Did have  meet with him and his wife about a week ago to discuss their finances so that things were set up for his wife in case something happened to him. Drinker and continues to actively drink etoh. About 3 liters of vodka a week.     Use of community resources: unknown    Oriental orthodox affiliation / involvement in griselda community: no - had last rights while in the hospital but his wife, Helena states that they are not part of a [employer] orth[employer] and that Sage would not want to talk with a  or .       Family History  "  I have reviewed this patient's family history and updated it with pertinent information if needed.   Family History   Problem Relation Age of Onset     Lung Cancer Mother      Heart Failure Father          age 87       Allergies   Allergies   Allergen Reactions     Ciprofloxacin Itching     Severe itching \"like ants were crawling\"     Hmg-Coa-R Inhibitors Other (See Comments)     Rhabdomyolysis occurred within a couple days       Medications   I have reviewed all medications      Review of Systems   The comprehensive review of systems is negative other than noted here and in the assessment/plan.    Palliative Symptom Review (0=no symptom/no concern, 1=mild, 2=moderate, 3=severe):  Pain: denies  Fatigue: 3  Nausea: denies  Constipation: 0  Diarrhea: 0  Drowsiness: 3  Poor Appetite: 3  Shortness of Breath: denies    Physical Exam   Temp: 96.4  F (35.8  C) Temp src: Axillary BP: 144/64 Pulse: 70 Heart Rate: 74 Resp: 16 SpO2: 94 % O2 Device: None (Room air)    Vitals:    19 0400 19 0225 06/15/19 0500   Weight: 97.9 kg (215 lb 13.3 oz) 119.3 kg (263 lb) 119.5 kg (263 lb 7.2 oz)     General: sleep, arouses to voice, appears older than stated age, obese, in NAD  Eyes: EOMI, sclera clear - no jaundice  HEENT: NC/AT; mucous membranes slightly dry  Lungs: no increased work of breathing, able to speak full sentences, CTA b/l upper lung fields anteriorly   Heart: RRR; afib not apprecaited  Abdomen: +BS, +ascites, soft, non tender to palpation, no rebound or guarding  Ext: BKA of left, right without edema  Neuropsych: opens eyes to voice, speaking but not coherent and falls asleep mid sentence, sensorium not intact    Data   Results for orders placed or performed during the hospital encounter of 19 (from the past 24 hour(s))   Care Transition RN/SW IP Consult    Narrative    Angus Sofia LSW     2019 11:19 AM  SW Consult Note:    D/I:  Hospice Consult Acknowledged.  SW met with patient's spouse "   Helena to introduce self, role and to discuss hospice resources   for patient.  Spouse was stating that she was recommended by   Gastro PA that she needed to meet with Palliative first and that   they were not ready for hospice at this time.  She stated that   she was waiting to hear about his recovery first and that patient   was currently having a paracentesis. She said that she will be   leaving the hospital for a couple of hours today, but that her   children will be around on Sunday afternoon and was requesting to   meet with palliative at that time.  SW to follow up with   Palliative and notify of spouse's availability for meeting and to   remain available in supporting family in discharge/hospice   planning as needed.     P: SW will remain available to assist for discharge/hospice   planning.    Angus Sofia, MSW, LGSW       Cell count with differential fluid   Result Value Ref Range    Body Fluid Analysis Source Ascites     % Neutrophils Fluid 34 %    % Lymphocytes Fluid 38 %    % Mono/Macro Fluid 28 %    Color Fluid Orange     Appearance Fluid Cloudy     WBC Fluid 362 /uL   US Paracentesis initial: high volume    Narrative    ULTRASOUND PARACENTESIS 6/14/2019 11:20 AM     HISTORY: Ascites. Therapeutic (high volume) paracentesis with fluid  analysis.    FINDINGS: Ultrasound was used to evaluate for the presence and best  approach for paracentesis. Written and oral informed consent was  obtained. A pause for the cause procedure to verify the correct  patient and correct procedure. The skin overlying the right lower  quadrant was prepped and draped in the usual sterile fashion. The  subcutaneous tissues were anesthetized with 10 mL 1% lidocaine. A  catheter was advanced into the peritoneal space and 3.6 L of  straw  colored fluid was drained. There were no immediate complications.  Ultrasound images were permanently stored.  Patient left the  ultrasound suite in satisfactory condition.      Impression     IMPRESSION: Technically successful paracentesis without immediate  complications.    CARMEN SERRATO MD   Vancomycin level   Result Value Ref Range    Vancomycin Level 18.8 mg/L   CBC with platelets   Result Value Ref Range    WBC 6.6 4.0 - 11.0 10e9/L    RBC Count 3.19 (L) 4.4 - 5.9 10e12/L    Hemoglobin 10.0 (L) 13.3 - 17.7 g/dL    Hematocrit 30.3 (L) 40.0 - 53.0 %    MCV 95 78 - 100 fl    MCH 31.3 26.5 - 33.0 pg    MCHC 33.0 31.5 - 36.5 g/dL    RDW 16.0 (H) 10.0 - 15.0 %    Platelet Count 103 (L) 150 - 450 10e9/L   INR   Result Value Ref Range    INR 1.67 (H) 0.86 - 1.14   Comprehensive metabolic panel   Result Value Ref Range    Sodium 147 (H) 133 - 144 mmol/L    Potassium 4.0 3.4 - 5.3 mmol/L    Chloride 117 (H) 94 - 109 mmol/L    Carbon Dioxide 22 20 - 32 mmol/L    Anion Gap 8 3 - 14 mmol/L    Glucose 88 70 - 99 mg/dL    Urea Nitrogen 37 (H) 7 - 30 mg/dL    Creatinine 1.93 (H) 0.66 - 1.25 mg/dL    GFR Estimate 33 (L) >60 mL/min/[1.73_m2]    GFR Estimate If Black 38 (L) >60 mL/min/[1.73_m2]    Calcium 8.6 8.5 - 10.1 mg/dL    Bilirubin Total 1.2 0.2 - 1.3 mg/dL    Albumin 2.1 (L) 3.4 - 5.0 g/dL    Protein Total 6.0 (L) 6.8 - 8.8 g/dL    Alkaline Phosphatase 60 40 - 150 U/L    ALT 10 0 - 70 U/L    AST 24 0 - 45 U/L

## 2019-06-15 NOTE — PLAN OF CARE
Patient is A/O x1, disorient to place, time and situation. Slight HTN (130s/70s) otherwise VSS on RA. LS diminished, fair, congested cough. Pt denies SOB,  mild MCCRACKEN with turns. Patient denies pain. Levin in place.  Up with lift, turned/repositioned q2h. DD2 with nectar thick diet, tolerating fair. Plan on continuing to monitor respiratory status and palliative consult possibly tomorrow.

## 2019-06-15 NOTE — PLAN OF CARE
SLP - RN and family were consulted this pm.  Patient was sleeping upon entering room.  Family requested to let pt sleep and check back 6/16 as indicated.  Will check patient status 6/16.

## 2019-06-16 ENCOUNTER — APPOINTMENT (OUTPATIENT)
Dept: SPEECH THERAPY | Facility: CLINIC | Age: 74
DRG: 871 | End: 2019-06-16
Payer: MEDICARE

## 2019-06-16 LAB
ANION GAP SERPL CALCULATED.3IONS-SCNC: 4 MMOL/L (ref 3–14)
BUN SERPL-MCNC: 31 MG/DL (ref 7–30)
CALCIUM SERPL-MCNC: 8.2 MG/DL (ref 8.5–10.1)
CHLORIDE SERPL-SCNC: 116 MMOL/L (ref 94–109)
CO2 SERPL-SCNC: 25 MMOL/L (ref 20–32)
CREAT SERPL-MCNC: 1.89 MG/DL (ref 0.66–1.25)
GFR SERPL CREATININE-BSD FRML MDRD: 34 ML/MIN/{1.73_M2}
GLUCOSE SERPL-MCNC: 94 MG/DL (ref 70–99)
POTASSIUM SERPL-SCNC: 4.1 MMOL/L (ref 3.4–5.3)
SODIUM SERPL-SCNC: 145 MMOL/L (ref 133–144)

## 2019-06-16 PROCEDURE — 25000132 ZZH RX MED GY IP 250 OP 250 PS 637: Mod: GY | Performed by: PHYSICIAN ASSISTANT

## 2019-06-16 PROCEDURE — 80048 BASIC METABOLIC PNL TOTAL CA: CPT | Performed by: HOSPITALIST

## 2019-06-16 PROCEDURE — 99207 ZZC CDG-MDM COMPONENT: MEETS LOW - DOWN CODED: CPT | Performed by: HOSPITALIST

## 2019-06-16 PROCEDURE — 99232 SBSQ HOSP IP/OBS MODERATE 35: CPT | Performed by: HOSPITALIST

## 2019-06-16 PROCEDURE — A9270 NON-COVERED ITEM OR SERVICE: HCPCS | Mod: GY | Performed by: HOSPITALIST

## 2019-06-16 PROCEDURE — A9270 NON-COVERED ITEM OR SERVICE: HCPCS | Mod: GY | Performed by: PHYSICIAN ASSISTANT

## 2019-06-16 PROCEDURE — 25000128 H RX IP 250 OP 636: Performed by: PHYSICIAN ASSISTANT

## 2019-06-16 PROCEDURE — 92526 ORAL FUNCTION THERAPY: CPT | Mod: GN | Performed by: SPEECH-LANGUAGE PATHOLOGIST

## 2019-06-16 PROCEDURE — 36415 COLL VENOUS BLD VENIPUNCTURE: CPT | Performed by: HOSPITALIST

## 2019-06-16 PROCEDURE — 25000132 ZZH RX MED GY IP 250 OP 250 PS 637: Mod: GY | Performed by: HOSPITALIST

## 2019-06-16 PROCEDURE — 12000000 ZZH R&B MED SURG/OB

## 2019-06-16 PROCEDURE — 25800030 ZZH RX IP 258 OP 636: Performed by: HOSPITALIST

## 2019-06-16 RX ORDER — SODIUM CHLORIDE 9 MG/ML
INJECTION, SOLUTION INTRAVENOUS CONTINUOUS
Status: DISCONTINUED | OUTPATIENT
Start: 2019-06-16 | End: 2019-06-19

## 2019-06-16 RX ADMIN — GABAPENTIN 300 MG: 300 CAPSULE ORAL at 08:38

## 2019-06-16 RX ADMIN — THERA TABS 1 TABLET: TAB at 08:38

## 2019-06-16 RX ADMIN — SODIUM CHLORIDE: 9 INJECTION, SOLUTION INTRAVENOUS at 18:40

## 2019-06-16 RX ADMIN — PIPERACILLIN SODIUM,TAZOBACTAM SODIUM 3.38 G: 3; .375 INJECTION, POWDER, FOR SOLUTION INTRAVENOUS at 18:40

## 2019-06-16 RX ADMIN — PIPERACILLIN SODIUM,TAZOBACTAM SODIUM 3.38 G: 3; .375 INJECTION, POWDER, FOR SOLUTION INTRAVENOUS at 12:25

## 2019-06-16 RX ADMIN — FOLIC ACID 1 MG: 1 TABLET ORAL at 08:37

## 2019-06-16 RX ADMIN — PIPERACILLIN SODIUM,TAZOBACTAM SODIUM 3.38 G: 3; .375 INJECTION, POWDER, FOR SOLUTION INTRAVENOUS at 23:03

## 2019-06-16 RX ADMIN — ACETAMINOPHEN 650 MG: 325 TABLET, FILM COATED ORAL at 13:10

## 2019-06-16 RX ADMIN — PIPERACILLIN SODIUM,TAZOBACTAM SODIUM 3.38 G: 3; .375 INJECTION, POWDER, FOR SOLUTION INTRAVENOUS at 05:23

## 2019-06-16 RX ADMIN — GABAPENTIN 300 MG: 300 CAPSULE ORAL at 20:18

## 2019-06-16 ASSESSMENT — MIFFLIN-ST. JEOR: SCORE: 1976.83

## 2019-06-16 ASSESSMENT — ACTIVITIES OF DAILY LIVING (ADL)
ADLS_ACUITY_SCORE: 20
ADLS_ACUITY_SCORE: 24

## 2019-06-16 NOTE — PLAN OF CARE
Patient is confused and alert to self. VSS. Denies pain. C/o nonproductive cough, gave cough drops and robitussin x1. On DD2 with nectar thick liquid, needing a 1:1 supervision with eating. Up with lift, turned and repositioned every 2 hours. Patient has left BKA. RLE edema +2. Levin in place. Bandaid on right abdomen, CDI. Coccyx is blanchable redness with foam dressing, CDI. Mid Spine blanchable redness, with foam dressing, CDI.

## 2019-06-16 NOTE — PLAN OF CARE
[id number]: Alert, confused. Oriented to self. Able to make needs known. C/o pain in sternum (from previous CPR), declined tylenol. Left BKA. Mild swelling in RLE. Scabs on leg covered with mepilex. Levin in place with adequate output. Bandaid to right abdomen CDI. Abdomen distended. Repositioned with ceiling lift and assist of 2. Tolerated pudding, applesauce, jello and two cups of thickened juice. Plan to monitor and revisit with palliative on Monday.

## 2019-06-16 NOTE — PLAN OF CARE
Oriented to self, VSS on RA. PIV in L forearm, saline locked. DD2 nectar thick liq, fed self at meals. Up with lift, turn/repo q2h. Improvement in alertness, speech clarity, and logical thinking throughout shift. Patient was accepting of all nursing cares and assessments. Discussed plan of care, palliative will follow up tomorrow. Gave Tylenol at 0115 for 3/10 neck pain. Continue IV abx and monitoring skin integrity.

## 2019-06-16 NOTE — PROGRESS NOTES
[facility]    Medicine Progress Note - Hospitalist Service       Date of Admission:  6/11/2019  Assessment & Plan   Mr. Antonio Ramirez is a 74 year old man with multiple complex past medical history of HTN, HLD, CAD s/p CABG, hx of alcoholic liver cirrhosis with portal HTN and ascites s/p multiple paracenteses, systolic CHF, valvular disease, paroxysmal Afib on chronic anticoagulation with Xarelto, antiphospholipid antibody syndrome with history of DVT and PE s/p IVC filter, hx MSSA bacteremia with hx Lt BKA, traumatic subdural hematoma, who was brought in for evaluation of generalized weakness, cough and vomiting.   He has had a general decline in his overall health in the last few months with multiple falls and severely impaired mobility, subdural hematoma in 2018 causing significant impairment in cognition, among others. He had a paracentesis the day prior to admission and developed chills and possible pneumonia resulting in admission to the hospital 6/10/19. A few hours after admission he was found apneic and pulseless ultimately with PEA cardiac arrest with CPR ~4 minutes prior to ROSC, thought primary respiratory cause. Transferred to ICU where he was unresponsive and a few hours later he went into VT arrest and received 2 minutes of CPR resulting in ROSC. He has slow been more interactive with increasing cognitive function.   - 6/13/19: weaned to room air, following basic commands and asking/answering questions with some comprehensible garbled speech, transfer out of ICU. Continue GOC discussion with family.       Goals of Care:   Continuing antibiotics and IVF while we wait to see how much recovery patient has from a neurologic standpoint.   - Family ok with abx, O2, IVF, however no escalation of cares, no transfer back to ICU at this time, DNR/DNI, not formally comfort care  - If respiratory worsens - NO INTUBATION, if requiring more than NC will need to speak with Wife  - Would like GI  evaluation and paracentesis when appropriate, hold Xarelto until decided about paracentesis  - Hold on Neurology and Nephrology consultations - reassess daily  - DNR/DNI   - 6/14 had lengthy talk with wife regarding overall picture, she was appreciative and realistic with current issues and with unclear timeline  - 6/15 Palliative meeting with family, appreciated  - Plans for meeting again tomorrow with family 6/16     Encephalopathy likely due to anoxia s/p Cardiac Arrest x2, 6/11/19  PEA cardiac arrest a few hours after admission, thought initially due to respiratory arrest. 1 round of CPR before achieving ROSC, and transfer to ICU. A few hours later the patient went into pulseless VT arrest and again received 1 round of CPR. He was terminally extubated later that day, see Code Blue and ICU notes for details. Baseline impaired cognitive function now with likely anoxic brain injury based on documented time without pulse as well as examination. Degree of anticipated impairment unclear but has prior brain injury that would make him more fragile.   Could not rule out seizure as a cause of his acute loss of consciousness, but no epileptiform activity on EEG, it did however show severe encephalopathy on 6/11/19.   Brain MRI 6/12/19: chronic subdural collection at Rt cerebral convexity since 2/2019. Diffuse cerebral volume loss. No acute intracranial pathology.  - Hold sedatives  - Judicious use of pain medications if needed  - has not been seen by Neuro formally, given arrest and now improvement in neurological function consultation deferred for now  - Continue PT/OT   - Reconsult SW when more appropriate  - Resolving 6/14-->6/16 but still confused at times     Mild/Moderate Oropharyngeal Dysphagia  - Consulted SLP - DD2/NTL, 1:1 supervision     Shock, septic vs cardiogenic   Community-acquired pneumonia versus aspiration pneumonia:    He presented with generalized weakness and coughing, which was  nonproductive. Weakness is not really new and he called EMS 6 times in the last month for recurrent falls. Tmax 101.4 in ER.  CXR mentions mild right basilar atelectasis or pneumonia and Abd/Pelvic CT confirmed patchy infiltrates in the right middle lobe and lingula, which might be pneumonia with bilateral pleural effusions and associated atelectasis. Procalcitonin 0.1 and WBC 10.2 on admit. Admitted to swallowing problems sometimes PTA, so aspiration pneumonia might be a possibility.   Given 1 dose of ceftriaxone and 1 dose of doxycycline in the ER. Given the fact that he had a paracentesis the day before admission on 06/10/2019, iatrogenic peritonitis cannot be completely excluded at this time.    BC day of admit growing staph simulans likely contaminant. Other Bcx neg.  - Continue antibiotics Zosyn and vancomycin  - Weaned to room air, O2 PRN  - family ok with abx, O2, no escalation of cares, no transfer back to ICU at this time, DNR/DNI, NOT formally comfort care     Alcoholic liver cirrhosis with recurrent ascites and portal hypertension.   Anasarca.   Alcohol abuse/dependence:    He continues to drink despite his advanced liver disease.  He had multiple paracenteses in the past, last paracentesis was done day PTA 06/10/2019 with 5.4 L of fluid removed. Presented with fever and generalized weakness, thought possibly related to pneumonia. He received 1 dose of ceftriaxone, 1 dose of doxycycline in ER.    He has been compliant with his torsemide and spironolactone.   EGD 2/2019 did not show esophageal varices, just portal hypertensive gastropathy.    - GI consulted on admission, not seen due to cardiac arrest and goals of care, now that pt's neurological statis is improving, d/w family and they would like GI evaluation for possible palliative paracentesis and opinion regarding diuresis once ANSELMO improved   - Zosyn/vanco as above, would also treat intraabdominal infxn  - Holding PTA torsemide 20 mg p.o. BID and  Aldactone 25 mg p.o. Daily due to renal dysfunction  - 6/16 renal function improving as above, but still not baseline, will request GI evaluation again on Monday for likely necessary repeat therapeutic paracentesis     Hx antiphospholipid antibody syndrome:     Hx deep venous thrombosis and pulmonary embolism:    On chronic anticoagulation with Xarelto. Status post inferior vena cava filter in place.    - Xarelto held on admission for repeat paracentesis, never restarted after cardiac arrest  - d/w family if they would like AC resumed, Brain MRI without changes to prior subdural - hold off until seen by GI and evaluating improvement   - wife wishes to not resume anticoagulation as of 6/14     Chronic systolic CHF with EF 40%-45%.   Severe aortic stenosis, moderate mitral regurgitation, moderate to severe tricuspid regurgitation.   ECHO 6/11/19: very difficult study, moderate aortic stenosis, mod decreased RV systolic function. RV moderately dilated, RA mod-severely dilated. Mild/mod MR and TR. EF 35-40%. Septal motion c/w conduction abnormality.   - Previously given gentle IVF due to ANSELMO, will need to monitor closely to prevent fluid overload     CAD with history of 3-vessel coronary artery bypass grafting  Aortic stenosis, Thoracic aortic aneurysm without rupture   Hypertension, Dyslipidemia    - PTA Coreg 3.125mg PO BID on hold due to arrest and initial need for pressors after arrest. May be able to resume tomorrow.  - No ASA PTA due to Xarelto.   - Holding PTA Demadex 20mg PO BID and Aldactone  - pending plans for goals of care - Palliative meeting tomorrow     Paroxysmal atrial fibrillation:    On chronic anticoagulation with Xarelto PTA. On admit, pt in atrial fibrillation with rate control.   - Holding coreg and Xarelto as above  - discussed with wife     Peripheral neuropathy:    - Hold PTA gabapentin pending neurological improvement      Anasarca, improved  Chronic Lymphedema  He does have significant lower  extremity pitting edema on admission.  He uses lymphedema wrapping, although not very compliant.    - Lymphedema consult       Generalized weakness  Seems to be an ongoing problem for him.  He called 911 six times last month because of recurrent falls. Has Lt BKA. He uses a walker or a wheelchair at home.  He lives with his wife.    - PT/OT/SW      Pressure ulcer, POA  - Wound care and dressing changes per protocol     Alcohol abuse/dependence:    He reported drinking 4-5 ounces of vodka daily.  Apparently, he did not have withdrawal symptoms in the past during his hospitalizations.    - Monitor for any withdrawal symptoms  - Seen by Psychiatry during his prior hospitalization, both at that time and on this admission the patient reported that he is not interested in quitting drinking, despite his advanced liver disease.      ANSELMO on CKD, stage III, improving  His most recent creatinine level baseline was between 1.7 and 1.9. Worsening Cr trend.  Cr: 1.99 --> 2.05 --> 2.17 --> 2.27 --> 2.53 --> 2.57 --> 2.3 6/14 --> 1.9 6/15  --> 6/16 1.8   - Continue IVF - off for now, given his other comorbidities, want to avoid overload, encourage PO intake - discussed with wife who agrees  - Consult nephrology if family wants to continue with cares, resasses daily       Diet: Combination Diet Dysphagia Diet Level 2: Mechan Altered; Nectar Thickened Liquids (pre-thickened or use instant food thickener)    DVT Prophylaxis: Pneumatic Compression Devices  Levin Catheter: in place, indication: Strict 1-2 Hour I&O  Code Status: DNR/DNI      Disposition Plan   Expected discharge: 3+ days pending goals of care meeting and continued improvement  Entered: Mark Garcia MD 06/16/2019, 3:15 PM       The patient's care was discussed with the Bedside Nurse, Patient and Patient's Family.    Mark Garcia MD  Hospitalist Service  [facility]  Hospital    ______________________________________________________________________    Interval History   No new complaints. More awake and alert today. Abdo increased in girth but not painful, not obstructing breathing. Family at bedside. Encouraged PT.     Data reviewed today: I reviewed all medications, new labs and imaging results over the last 24 hours. I personally reviewed no images or EKG's today.    Physical Exam   Vital Signs: Temp: 98.1  F (36.7  C) Temp src: Oral BP: 111/56   Heart Rate: 77 Resp: 16 SpO2: 94 % O2 Device: None (Room air)    Weight: 259 lbs 8 oz    Gen: NAD, pleasant  HEENT: Normocephalic, EOMI, MMM  Resp: a few basilar crackles,  no wheezes, no increased work of resp  CV: S1S2 heard, reg rhythm, reg rate, pitting pedal edema  Abdo: soft, nontender, nondistended, bowel sounds present  Ext: calves nontender, well perfused  Neuro: AAOx3, CN grossly intact, no facial asymmetry      Data   Recent Labs   Lab 06/16/19  0714 06/15/19  0651 06/14/19  0751 06/13/19  0539  06/11/19  1029 06/11/19  0710   WBC  --  6.6 6.3 8.5   < > 21.3* 9.7   HGB  --  10.0* 9.8* 9.6*   < > 10.7* 10.7*   MCV  --  95 95 98   < > 94 95   PLT  --  103* 112* 119*   < > 160 159   INR  --  1.67* 1.53* 1.55*   < > 1.70* 1.65*   * 147* 143 143   < > 138 137   POTASSIUM 4.1 4.0 3.9 4.6   < > 5.2 5.0   CHLORIDE 116* 117* 114* 111*   < > 107 105   CO2 25 22 22 26   < > 27 24   BUN 31* 37* 43* 49*   < > 46* 46*   CR 1.89* 1.93* 2.30* 2.57*   < > 2.17* 2.05*   ANIONGAP 4 8 7 6   < > 4 8   BENNIE 8.2* 8.6 8.8 8.4*   < > 8.1* 8.2*   GLC 94 88 101* 88   < > 107* 138*   ALBUMIN  --  2.1* 2.2* 2.1*   < > 2.4* 2.5*   PROTTOTAL  --  6.0* 6.3* 6.0*   < > 6.5* 6.7*   BILITOTAL  --  1.2 1.3 1.1   < > 1.2 0.9   ALKPHOS  --  60 65 62   < > 83 88   ALT  --  10 10 11   < > 12 11   AST  --  24 20 22   < > 28 27   TROPI  --   --   --   --   --  0.433* 0.027    < > = values in this interval not displayed.     No results found for this or any  previous visit (from the past 24 hour(s)).

## 2019-06-16 NOTE — PLAN OF CARE
Discharge Planner SLP   Patient plan for discharge: Did not state   Current status: Swallow Tx was provided this pm. Nursing reports pt tolerated po intake well this am.  Pt demonstrated mild dysarthria, decreased oral awareness, hoarse voice, delayed swallows with mild decreased elevation, need for double swallows, and min throat clear/wet voice intermittently during the session.  No overt coughing was observed.  Gradual progress made.  Recommend a continued dysphagia diet 2 and nectar thick liquids with 1:1 supervision and feeding assistance. Pt to be seated fully upright, small bite/sips, slow rate of intake, straws ok if by single sip, double swallows.  Plan to continue Tx to assess safety for diet/liquid upgrades.  Barriers to return to prior living situation: Below baseline, cognition, aspiration risk   Recommendations for discharge: TCU  Rationale for recommendations: SLP at next level of care for management of dysphagia and cog/ling deficits as appropriate; Cognitive-linguistic eval and Tx at next level of care             Entered by: Christine Mcelroy 06/16/2019 1:20 PM

## 2019-06-17 ENCOUNTER — APPOINTMENT (OUTPATIENT)
Dept: SPEECH THERAPY | Facility: CLINIC | Age: 74
DRG: 871 | End: 2019-06-17
Payer: MEDICARE

## 2019-06-17 ENCOUNTER — APPOINTMENT (OUTPATIENT)
Dept: PHYSICAL THERAPY | Facility: CLINIC | Age: 74
DRG: 871 | End: 2019-06-17
Payer: MEDICARE

## 2019-06-17 PROBLEM — I50.9 CHRONIC CONGESTIVE HEART FAILURE (H): Status: ACTIVE | Noted: 2017-06-30

## 2019-06-17 LAB
ANION GAP SERPL CALCULATED.3IONS-SCNC: 6 MMOL/L (ref 3–14)
BACTERIA SPEC CULT: NO GROWTH
BUN SERPL-MCNC: 28 MG/DL (ref 7–30)
CALCIUM SERPL-MCNC: 8.3 MG/DL (ref 8.5–10.1)
CHLORIDE SERPL-SCNC: 112 MMOL/L (ref 94–109)
CO2 SERPL-SCNC: 23 MMOL/L (ref 20–32)
CREAT SERPL-MCNC: 1.85 MG/DL (ref 0.66–1.25)
GFR SERPL CREATININE-BSD FRML MDRD: 35 ML/MIN/{1.73_M2}
GLUCOSE SERPL-MCNC: 84 MG/DL (ref 70–99)
Lab: NORMAL
POTASSIUM SERPL-SCNC: 4 MMOL/L (ref 3.4–5.3)
SODIUM SERPL-SCNC: 141 MMOL/L (ref 133–144)
SPECIMEN SOURCE: NORMAL

## 2019-06-17 PROCEDURE — A9270 NON-COVERED ITEM OR SERVICE: HCPCS | Mod: GY | Performed by: HOSPITALIST

## 2019-06-17 PROCEDURE — 12000000 ZZH R&B MED SURG/OB

## 2019-06-17 PROCEDURE — 97530 THERAPEUTIC ACTIVITIES: CPT | Mod: GP

## 2019-06-17 PROCEDURE — 92526 ORAL FUNCTION THERAPY: CPT | Mod: GN | Performed by: SPEECH-LANGUAGE PATHOLOGIST

## 2019-06-17 PROCEDURE — 99233 SBSQ HOSP IP/OBS HIGH 50: CPT | Performed by: NURSE PRACTITIONER

## 2019-06-17 PROCEDURE — 25000128 H RX IP 250 OP 636: Performed by: PHYSICIAN ASSISTANT

## 2019-06-17 PROCEDURE — 25000132 ZZH RX MED GY IP 250 OP 250 PS 637: Mod: GY | Performed by: HOSPITALIST

## 2019-06-17 PROCEDURE — 25000132 ZZH RX MED GY IP 250 OP 250 PS 637: Mod: GY | Performed by: PHYSICIAN ASSISTANT

## 2019-06-17 PROCEDURE — 80048 BASIC METABOLIC PNL TOTAL CA: CPT | Performed by: HOSPITALIST

## 2019-06-17 PROCEDURE — 99233 SBSQ HOSP IP/OBS HIGH 50: CPT | Performed by: HOSPITALIST

## 2019-06-17 PROCEDURE — 36415 COLL VENOUS BLD VENIPUNCTURE: CPT | Performed by: HOSPITALIST

## 2019-06-17 PROCEDURE — A9270 NON-COVERED ITEM OR SERVICE: HCPCS | Mod: GY | Performed by: PHYSICIAN ASSISTANT

## 2019-06-17 RX ADMIN — PIPERACILLIN SODIUM,TAZOBACTAM SODIUM 3.38 G: 3; .375 INJECTION, POWDER, FOR SOLUTION INTRAVENOUS at 05:01

## 2019-06-17 RX ADMIN — GABAPENTIN 300 MG: 300 CAPSULE ORAL at 20:57

## 2019-06-17 RX ADMIN — GABAPENTIN 300 MG: 300 CAPSULE ORAL at 08:41

## 2019-06-17 RX ADMIN — THERA TABS 1 TABLET: TAB at 08:41

## 2019-06-17 RX ADMIN — FOLIC ACID 1 MG: 1 TABLET ORAL at 08:41

## 2019-06-17 RX ADMIN — PIPERACILLIN SODIUM,TAZOBACTAM SODIUM 3.38 G: 3; .375 INJECTION, POWDER, FOR SOLUTION INTRAVENOUS at 11:49

## 2019-06-17 ASSESSMENT — ACTIVITIES OF DAILY LIVING (ADL)
ADLS_ACUITY_SCORE: 22
ADLS_ACUITY_SCORE: 24
ADLS_ACUITY_SCORE: 23
ADLS_ACUITY_SCORE: 22
ADLS_ACUITY_SCORE: 23
ADLS_ACUITY_SCORE: 22

## 2019-06-17 ASSESSMENT — MIFFLIN-ST. JEOR: SCORE: 1968.67

## 2019-06-17 NOTE — PLAN OF CARE
OT: Attempted intervention; however, pt with spiritual health requesting ~30 mins followed by scheduled PT session.

## 2019-06-17 NOTE — PLAN OF CARE
Patient a/o x4 this am but orientation changes t/o the day.  Can be forgetful.  Has pitting edema 2-3+ from thighs down to bottom of both extremities.  Levin cath in place with adequate urine.  Continent and incontinent of bowel and bladder.  Attempted to stand patient with PT but patient unable so used ceiling lift to get up in chair for supper.  Advanced diet to DD3 with thin and no straws for lunch and supper.  Wound to mid spine with mepilex in place, mepilex in place for protection to coccyx.  Discharge pending placement found.

## 2019-06-17 NOTE — PLAN OF CARE
Patient is confused and alert to self but is pleasant. VSS. Denies pain. Tolerating DD2 with nectar thick liquids. Up with lift and turned and repositioned every 2 hours. L PIV SL with intermittent zosyn. Coccyx with blanchable redness and foam dressing CDI, spine wound with blanchable redness, foam dressing CDI. Right leg wound with foam dressing on. Has left BKA, some leg weeping and BLE edema +2. Plan for palliative follow up 6/17 AM.

## 2019-06-17 NOTE — PROGRESS NOTES
[facility]    Palliative Care Progress Note    Antonio Ramirez  MRN# [id number]  Date of Admission:  6/11/2019  Date of Service (when I saw the patient): 06/17/2019    Assessment & Plan   Mr. Antonio Ramirez is a 74 year old man with multiple complex past medical history of HTN, CAD s/p CABG, hx of alcoholic liver cirrhosis with portal HTN and ascites s/p multiple paracenteses, systolic CHF, valvular disease, paroxysmal Afib on chronic anticoagulation, antiphospholipid antibody syndrome with history of DVT and PE s/p IVC filter, hx MSSA bacteremia with hx Lt BKA, traumatic subdural hematoma in 2018 causing sig cognitive decline and has had a generalized decline in his overall health in the past few months with multiple falls and severely impaired mobility and generalized weakness. He had a paracentesis the day prior to admission and developed increased weakness, cough, chills and possible pneumonia resulting in admission to the hospital on 6/11/19. A few hours after admission he was found apneic and pulseless ultimately with PEA cardiac arrest with CPR ~4 minutes prior to ROSC. Transferred to ICU where he was unresponsive and a few hours later he went into VT arrest and received 2 minutes of CPR resulting in ROSC. Since then, he has slowly been more interactive with increasing cognitive function. On 6/13/19 he was weaned to room air, following basic commands and asking/answering questions with some comprehensible garbled speech, transfer out of ICU. Sage is able to communicate some basic needs and can recognize his wife but not oriented to place and intermittently delirious and without capacity.        Symptoms/Recommendations/Discussions   Brief meeting with Sage and his wife Helena. Sage was A&Ox3, did not remember what brought him into the hospital. We discussed that his liver is quite sick and Sage began asking questions about the possibility of a liver transplant and albumin infusions, etc. I  "explained that these questions would be best answered by a GI specialist. His wife then apologized for being abrupt, but stated she needed to leave the hospital and wanted me to know that they have discussed the plan and he wants to discharge to TCU and would like his code status to be reversed to FULL. We did not have much time to fully discuss the implications of these choices, but patient seemed very intent to continue along a restorative pathway and his wife said she would honor his wishes as long as he was alert and \"with it\" enough to make them known. I updated pt's bedside nurse, unit care coordinator and Dr Garcia of this conversation.  -FULL CODE  -Discharge to TCU when medically stable    Support/Coping  -pt's wife Helena at bedside  -Will involve Palliative LICSW, Latasha Andrews, and/or Palliative , Ariadna Hewitt    Decisional Support, Goals of Care, Counseling & Coordination  Decisional Capacity Intact?  -Yes  Health Care Directive on File?  -No  Code Status/Resuscitation Preferences?  -Full  Plan of Care?  -TCU when medically stable    Thank you for involving us in the care of this patient and family. We will continue to follow. Please do not hesitate to contact me with questions or concerns or the on-call provider for our team if evening or weekend.    Nini QUESADA, [facility]  Palliative Medicine   Pager [phone]    Attestation:  Total time on the floor involved in the patient's care: 35 minutes  Total time spent in counseling/care coordination: >50%    Interval History   No acute events overnight.       Medications   Current Facility-Administered Medications Ordered in Epic   Medication Dose Route Frequency Last Rate Last Dose     acetaminophen (TYLENOL) Suppository 650 mg  650 mg Rectal Q4H PRN         acetaminophen (TYLENOL) tablet 650 mg  650 mg Oral Q4H PRN   650 mg at 06/16/19 1310     albumin human 25 % injection 12.5 g  12.5 g Intravenous Once         benzocaine-menthol " (CHLORASEPTIC) 6-10 MG lozenge 1 lozenge  1 lozenge Buccal Q1H PRN   1 lozenge at 06/15/19 2340     glucose gel 15-30 g  15-30 g Oral Q15 Min PRN        Or     dextrose 50 % injection 25-50 mL  25-50 mL Intravenous Q15 Min PRN        Or     glucagon injection 1 mg  1 mg Subcutaneous Q15 Min PRN         fentaNYL (PF) (SUBLIMAZE) injection 25 mcg  25 mcg Intravenous Q15 Min PRN   25 mcg at 06/13/19 0434     folic acid (FOLVITE) tablet 1 mg  1 mg Oral Daily   1 mg at 06/17/19 0841     gabapentin (NEURONTIN) capsule 300 mg  300 mg Oral BID   300 mg at 06/17/19 0841     glycopyrrolate (ROBINUL) injection 0.1-0.2 mg  0.1-0.2 mg Intravenous Q4H PRN   0.2 mg at 06/12/19 1407     guaiFENesin (ROBITUSSIN) 20 mg/mL solution 10 mL  10 mL Oral Q4H PRN   10 mL at 06/15/19 2346     HYDROmorphone (PF) (DILAUDID) injection 0.2-0.4 mg  0.2-0.4 mg Intravenous Q2H PRN         lidocaine (LMX4) cream   Topical Q1H PRN         lidocaine 1 % 0.1-1 mL  0.1-1 mL Other Q1H PRN         multivitamin, therapeutic (THERA-VIT) tablet 1 tablet  1 tablet Oral Daily   1 tablet at 06/17/19 0841     naloxone (NARCAN) injection 0.1-0.4 mg  0.1-0.4 mg Intravenous Q2 Min PRN         ondansetron (ZOFRAN-ODT) ODT tab 4 mg  4 mg Oral Q6H PRN        Or     ondansetron (ZOFRAN) injection 4 mg  4 mg Intravenous Q6H PRN         Patient is already receiving anticoagulation with heparin, enoxaparin (LOVENOX), warfarin (COUMADIN)  or other anticoagulant medication   Does not apply Continuous PRN         piperacillin-tazobactam (ZOSYN) 3.375 g vial to attach to  mL bag  3.375 g Intravenous Q6H   Stopped at 06/17/19 1239     senna-docusate (SENOKOT-S/PERICOLACE) 8.6-50 MG per tablet 1 tablet  1 tablet Oral BID PRN        Or     senna-docusate (SENOKOT-S/PERICOLACE) 8.6-50 MG per tablet 2 tablet  2 tablet Oral BID PRN         sodium chloride (PF) 0.9% PF flush 3 mL  3 mL Intracatheter q1 min prn   3 mL at 06/11/19 0619     sodium chloride (PF) 0.9% PF flush 3 mL   3 mL Intracatheter Q8H   3 mL at 06/17/19 1337     sodium chloride 0.9% infusion   Intravenous Continuous 10 mL/hr at 06/16/19 1840       No current [facility]-ordered outpatient medications on file.       Physical Exam   Temp: 98.2  F (36.8  C) Temp src: Oral BP: 121/58   Heart Rate: 67 Resp: 18 SpO2: 95 % O2 Device: None (Room air)    Vitals:    06/15/19 0500 06/16/19 0512 06/17/19 0300   Weight: 119.5 kg (263 lb 7.2 oz) 117.7 kg (259 lb 8 oz) 116.9 kg (257 lb 11.2 oz)     CONSTITUTIONAL: NAD, A&Ox3. Calm and cooperative.  HEENT: NCAT, MMM  NECK: Supple  CARDIOVASCULAR: exam deferred   RESPIRATORY: NL respiratory effort on RA  GASTROINTESTINAL: exam deferred   MUSCULOSKELETAL: Moving freely in bed   SKIN: Warm and intact. No concerning lesions or rashes on exposed skin surfaces   NEUROLOGIC: Appropriately responsive during interview  PSYCH: Affect congruent     Data   Results for orders placed or performed during the hospital encounter of 06/11/19 (from the past 24 hour(s))   Basic metabolic panel   Result Value Ref Range    Sodium 141 133 - 144 mmol/L    Potassium 4.0 3.4 - 5.3 mmol/L    Chloride 112 (H) 94 - 109 mmol/L    Carbon Dioxide 23 20 - 32 mmol/L    Anion Gap 6 3 - 14 mmol/L    Glucose 84 70 - 99 mg/dL    Urea Nitrogen 28 7 - 30 mg/dL    Creatinine 1.85 (H) 0.66 - 1.25 mg/dL    GFR Estimate 35 (L) >60 mL/min/[1.73_m2]    GFR Estimate If Black 40 (L) >60 mL/min/[1.73_m2]    Calcium 8.3 (L) 8.5 - 10.1 mg/dL

## 2019-06-17 NOTE — PROGRESS NOTES
[facility]    Medicine Progress Note - Hospitalist Service       Date of Admission:  6/11/2019  Assessment & Plan    Mr. Antonio Ramirez is a 74 year old man with multiple complex past medical history of HTN, HLD, CAD s/p CABG, hx of alcoholic liver cirrhosis with portal HTN and ascites s/p multiple paracenteses, systolic CHF, valvular disease, paroxysmal Afib on chronic anticoagulation with Xarelto, antiphospholipid antibody syndrome with history of DVT and PE s/p IVC filter, hx MSSA bacteremia with hx Lt BKA, traumatic subdural hematoma, who was brought in for evaluation of generalized weakness, cough and vomiting.   He has had a general decline in his overall health in the last few months with multiple falls and severely impaired mobility, subdural hematoma in 2018 causing significant impairment in cognition, among others. He had a paracentesis the day prior to admission and developed chills and possible pneumonia resulting in admission to the hospital 6/10/19. A few hours after admission he was found apneic and pulseless ultimately with PEA cardiac arrest with CPR ~4 minutes prior to ROSC, thought primary respiratory cause. Transferred to ICU where he was unresponsive and a few hours later he went into VT arrest and received 2 minutes of CPR resulting in ROSC. He has slow been more interactive with increasing cognitive function.   - 6/13/19: weaned to room air, following basic commands and asking/answering questions with some comprehensible garbled speech, transfer out of ICU. Continue GOC discussion with family.       Goals of Care:   To summarize as of 6/17, after patient was resuscitated from 2 cardiac arrests early during admission, prognosis was very poor especially given his underlying comorbidities.  At that time, care team and family agreed that not escalating care and being DNR/DNI was best.  Subsequently, I discussed with Helena, patient's spouse, regarding the overall poor prognosis with  concern of anoxia during CODE BLUE.  Over the days of 6/14 through 6/17 his cognition and orientation have improved dramatically.  Assistance from palliative care is greatly appreciated and as of 6/17 patient and family are now in agreement to return to being full code and pursue restorative cares as much as possible.  Patient is agreeable to TCU.     Encephalopathy likely due to anoxia s/p Cardiac Arrest x2, 6/11/19 - resolving  Could not rule out seizure as a cause of his acute loss of consciousness, but no epileptiform activity on EEG, it did however show severe encephalopathy on 6/11/19.   Brain MRI 6/12/19: chronic subdural collection at Rt cerebral convexity since 2/2019. Diffuse cerebral volume loss. No acute intracranial pathology.  - Resolving 6/14-->6/16 but still confused at times  - 6/17 nearly back to baseline, slight confusion noted, otherwise fully oriented     Mild/Moderate Oropharyngeal Dysphagia  - Consulted SLP - DD2/NTL, 1:1 supervision     Shock, septic vs cardiogenic   Community-acquired pneumonia versus aspiration pneumonia:    He presented with generalized weakness and coughing, which was nonproductive. Weakness is not really new and he called EMS 6 times in the last month for recurrent falls. Tmax 101.4 in ER.  CXR mentions mild right basilar atelectasis or pneumonia and Abd/Pelvic CT confirmed patchy infiltrates in the right middle lobe and lingula, which might be pneumonia with bilateral pleural effusions and associated atelectasis. Procalcitonin 0.1 and WBC 10.2 on admit. Admitted to swallowing problems sometimes PTA, so aspiration pneumonia might be a possibility.   Given 1 dose of ceftriaxone and 1 dose of doxycycline in the ER. Given the fact that he had a paracentesis the day before admission on 06/10/2019, iatrogenic peritonitis cannot be completely excluded at this time.    BC day of admit growing staph simulans likely contaminant. Other Bcx neg.  - Initially on vanc and zosyn;  de-escalated to zosyn, total 7days  - 6/17 afebrile, on RA, BP normal, antibiotics stopped     Alcoholic liver cirrhosis with recurrent ascites and portal hypertension.   Anasarca.   Alcohol abuse/dependence:    He continues to drink despite his advanced liver disease.  He had multiple paracenteses in the past, last paracentesis was done day PTA 06/10/2019 with 5.4 L of fluid removed. Presented with fever and generalized weakness, thought possibly related to pneumonia. He received 1 dose of ceftriaxone, 1 dose of doxycycline in ER.    He has been compliant with his torsemide and spironolactone.   EGD 2/2019 did not show esophageal varices, just portal hypertensive gastropathy.    - GI following - paracentesis possibly 6/18  - Holding PTA torsemide 20 mg p.o. BID and Aldactone 25 mg p.o. - likely ok to resume 6/18 or 19    Hx antiphospholipid antibody syndrome:     Hx deep venous thrombosis and pulmonary embolism:    On chronic anticoagulation with Xarelto. Status post inferior vena cava filter in place.    - Xarelto held on admission for repeat paracentesis, never restarted after cardiac arrest and OK to hold per wife until 6/17 when goals of care are now fully restorative, and AC should resume after upcoming paracentesis     Chronic systolic CHF with EF 40%-45%.   Severe aortic stenosis, moderate mitral regurgitation, moderate to severe tricuspid regurgitation.   ECHO 6/11/19: very difficult study, moderate aortic stenosis, mod decreased RV systolic function. RV moderately dilated, RA mod-severely dilated. Mild/mod MR and TR. EF 35-40%. Septal motion c/w conduction abnormality.   - diuretics held as below, resume soon - possibly 6/18  - as of 6/17, ++ peripheral edema but is saturating normally on RA; likely result of anasarca due to minimal intravascular oncotic pressure      CAD with history of 3-vessel coronary artery bypass grafting  Aortic stenosis, Thoracic aortic aneurysm without rupture    Hypertension, Dyslipidemia    - PTA Coreg 3.125mg PO BID on hold due to arrest and initial need for pressors after arrest. May be able to resume tomorrow.  - No ASA PTA due to Xarelto.   - Held PTA Demadex 20mg PO BID and Aldactone given ANSELMO; consider resuming in the next day or two if renal function continues to be stable and BP can tolerate     Paroxysmal atrial fibrillation:    On chronic anticoagulation with Xarelto PTA.   - rates continue to be controlled  - xarelto held previously at wife's request after much discussion  - as of 6/17, restorative goals re-pursued and therefore after upcoming paracentesis is done, anticoagulation should be resumed     Peripheral neuropathy:    - PTA gabapentin resumed with lower dosage given ANSELMO which is now resolving and prior decreased cognition     Anasarca, improved  Chronic Lymphedema  He does have significant lower extremity pitting edema on admission.  He uses lymphedema wrapping, although not very compliant.    - Lymphedema consulted      Generalized weakness  Seems to be an ongoing problem for him.  He called 911 six times last month because of recurrent falls. Has Lt BKA. He uses a walker or a wheelchair at home.  He lives with his wife.    - PT/OT/SW - following TCU recommended (see 6/17 goals of care transition to restorative above)     Pressure ulcer, POA  - Wound care and dressing changes per protocol     Alcohol abuse/dependence:    He reported drinking 4-5 ounces of vodka daily.  Apparently, he did not have withdrawal symptoms in the past during his hospitalizations.    - Monitor for any withdrawal symptoms  - Seen by Psychiatry during his prior hospitalization, both at that time and on this admission the patient reported that he is not interested in quitting drinking, despite his advanced liver disease.      ANSELMO (resolved) on CKD, stage III  His most recent creatinine level baseline was between 1.7 and 1.9.  Cr: 1.99 --> --> 2.53 --> 2.57 -->--> 6/17 1.8  "  - Tolerating PO intake, IVF stopped previously due to total body fluid up, want to avoid pulmonary edema  - Consider resuming diuretics 6/18 if stable and BP can tolerate    Diet: Combination Diet Dysphagia Diet Level 3: Advanced; Thin Liquids (water, ice chips, juice, milk gelatin, ice cream, etc) (NO STRAW)    DVT Prophylaxis: Pneumatic Compression Devices; xarelto to resume after possible upcoming paracentesis  Levin Catheter: in place, indication: Strict 1-2 Hour I&O  Code Status: Full Code      Disposition Plan   Expected discharge: TCU likely in 2-3 days pending continued improvement and paracentesis likely in next day or two  Entered: Mark Garcia MD 06/17/2019, 3:22 PM       The patient's care was discussed with the Bedside Nurse, Care Coordinator/, Patient, Patient's Family and Palliative Team.    Mark Garcia MD  Hospitalist Service  [facility]    ______________________________________________________________________    Interval History   Continued improvement overall.  Patient denies complaints.  As with the prior 3 days his cognition and orientation have improved-he appears to be nearly baseline.  Reports that his abdomen is \"full of fluid\" again but it is not painful and is not having trouble breathing.  See palliative care note for full details however plan is now to return to full restorative efforts and CODE STATUS has been changed to full code.  Patient is now agreeable to TCU upon discharge.    Data reviewed today: I reviewed all medications, new labs and imaging results over the last 24 hours. I personally reviewed no images or EKG's today.    Physical Exam   Vital Signs: Temp: 98.2  F (36.8  C) Temp src: Oral BP: 121/58   Heart Rate: 67 Resp: 18 SpO2: 95 % O2 Device: None (Room air)    Weight: 257 lbs 11.2 oz    Gen: NAD, pleasant, elderly  HEENT: Normocephalic, EOMI, MMM  Resp: no crackles,  no wheezes, no increased work of resp  CV: S1S2 heard, reg " rhythm, reg rate, 2+ pitting pedal edema  Abdo:  nontender, full not hard, no rebound, bowel sounds present  Ext: 2+ pitting, Left stump intact, well perfused  Neuro: AAOx3, CN grossly intact, no facial asymmetry      Data   Recent Labs   Lab 06/17/19  0736 06/16/19  0714 06/15/19  0651 06/14/19  0751 06/13/19  0539  06/11/19  1029 06/11/19  0710   WBC  --   --  6.6 6.3 8.5   < > 21.3* 9.7   HGB  --   --  10.0* 9.8* 9.6*   < > 10.7* 10.7*   MCV  --   --  95 95 98   < > 94 95   PLT  --   --  103* 112* 119*   < > 160 159   INR  --   --  1.67* 1.53* 1.55*   < > 1.70* 1.65*    145* 147* 143 143   < > 138 137   POTASSIUM 4.0 4.1 4.0 3.9 4.6   < > 5.2 5.0   CHLORIDE 112* 116* 117* 114* 111*   < > 107 105   CO2 23 25 22 22 26   < > 27 24   BUN 28 31* 37* 43* 49*   < > 46* 46*   CR 1.85* 1.89* 1.93* 2.30* 2.57*   < > 2.17* 2.05*   ANIONGAP 6 4 8 7 6   < > 4 8   BENNIE 8.3* 8.2* 8.6 8.8 8.4*   < > 8.1* 8.2*   GLC 84 94 88 101* 88   < > 107* 138*   ALBUMIN  --   --  2.1* 2.2* 2.1*   < > 2.4* 2.5*   PROTTOTAL  --   --  6.0* 6.3* 6.0*   < > 6.5* 6.7*   BILITOTAL  --   --  1.2 1.3 1.1   < > 1.2 0.9   ALKPHOS  --   --  60 65 62   < > 83 88   ALT  --   --  10 10 11   < > 12 11   AST  --   --  24 20 22   < > 28 27   TROPI  --   --   --   --   --   --  0.433* 0.027    < > = values in this interval not displayed.     No results found for this or any previous visit (from the past 24 hour(s)).

## 2019-06-17 NOTE — PLAN OF CARE
PT:  Discharge Planner PT   Patient plan for discharge: TCU  Current status: Pt mildly confused but appears to be improved from yesterday. Pt performed supine to sit with mod A, sat EOB a few min and dizziness did not subside so returned to bed with mod A. RN assisted in cleaning pt up and putting him on the bedpan with mod A needed for rolling repeatedly. Donned pt's BKA socket but his L LE is too swollen to fit into his prosthesis at this time.  Barriers to return to prior living situation: Needs lift at this time for mobility, high falls risk, confusion, edema prohibiting his use of his L LE prosthesis.  Recommendations for discharge: TCU  Rationale for recommendations: Pt would benefit from continued PT to improve strength, balance, mobility to increase independence, reduce falls risk and increase safety before returning home.       Entered by: Marie Nino 06/17/2019 4:55 PM

## 2019-06-17 NOTE — PROGRESS NOTES
"[facility]  [facility]  Gastroenterology Progress note    Interval History:    Pt to have evaluation by palliative care today.  Confused but pleasant.  Denies complaints.    Vital Signs:      /58 (BP Location: Left arm)   Pulse 70   Temp 98.2  F (36.8  C) (Oral)   Resp 18   Ht 1.88 m (6' 2\")   Wt 116.9 kg (257 lb 11.2 oz)   SpO2 95%   BMI 33.09 kg/m    Temp (24hrs), Av.2  F (36.2  C), Min:96.1  F (35.6  C), Max:98.2  F (36.8  C)    Patient Vitals for the past 72 hrs:   Weight   19 0300 116.9 kg (257 lb 11.2 oz)   19 0512 117.7 kg (259 lb 8 oz)   06/15/19 0500 119.5 kg (263 lb 7.2 oz)       Intake/Output Summary (Last 24 hours) at 2019 1253  Last data filed at 2019 0543  Gross per 24 hour   Intake 200 ml   Output 675 ml   Net -475 ml         Constitutional: NAD, comfortable  Cardiovascular: RRR, normal S1, S2   Respiratory: CTAB  Abdomen: soft, non-tender, nondistended    Additional Comments:  ROS, FH, SH: See initial GI consult for details.    Laboratory Data:  Recent Labs   Lab Test 06/15/19  0651 19  0751 19  0539   WBC 6.6 6.3 8.5   HGB 10.0* 9.8* 9.6*   MCV 95 95 98   * 112* 119*   INR 1.67* 1.53* 1.55*     Recent Labs   Lab Test 19  0736 19  0714 06/15/19  0651    145* 147*   POTASSIUM 4.0 4.1 4.0   CHLORIDE 112* 116* 117*   CO2 23 25 22   BUN 28 31* 37*   CR 1.85* 1.89* 1.93*   ANIONGAP 6 4 8   BENNIE 8.3* 8.2* 8.6     Recent Labs   Lab Test 06/15/19  0651 19  0751 19  0539  19  0220  19  1320 19  1635 19  1625  18  0714  18  0737   ALBUMIN 2.1* 2.2* 2.1*   < >  --    < >  --   --  2.7*   < > 3.0*   < > 2.8*   BILITOTAL 1.2 1.3 1.1   < >  --    < >  --   --  2.9*   < > 0.8   < > 1.5*   DBIL  --   --   --   --   --   --   --   --  1.4*  --  0.5*  --  0.7*   ALT 10 10 11   < >  --    < >  --   --  11   < > 10   < > 16   AST 24 20 22   < >  --    < >  --  "  --  24   < > 25   < > 41   ALKPHOS 60 65 62   < >  --    < >  --   --  90   < > 65   < > 89   PROTEIN  --   --   --   --  10*  --  10* 10*  --    < >  --    < >  --     < > = values in this interval not displayed.         Assessment:  73 yo male with alcoholic liver disease with continued alcohol consumption, ascites related to portal hypertension and malnutrition who is admitted with pneumonia.  He has had a complicated course with respiratory and cardiac arrest.  Patient was compassionately extubated but improved from a cognitive standpoint.  He continues to have a neurological deficit but is continuing to improve.  He has re accumulating ascites, however, the patient's acute kidney injury precludes the use of diuretics.  Plan:  -  Therapeutic paracentesis with albumin replacement as needed.    -  Palliative care consult pending.  -  Diuretics being held due to acute kidney injury.  -  Diet as tolerated.      NOREEN Masters  [facility]  Office:  [phone] call if needed after 5PM  Cell:  [phone], not available after 5PM at this number

## 2019-06-17 NOTE — PLAN OF CARE
Discharge Planner SLP   Patient plan for discharge: Did not state  Current status: Swallow Tx was provided this am.  Patient was alert, cooperative, and asking for a diet/liquid upgrade.  Baseline throat clear and slight wet voice noted in conversation.  Patient demonstrated decreased oral awareness, a swallow delay, and mild decreased laryngeal elevation during po trials.  Throat clear observed x 1 and baseline slight wet voice were observed.  No overt coughing or respiratory difficulty noted.  Gradual progress made.  Recommend close monitoring, 1:1 supervision/assist with a diet upgrade to dysphagia diet level 3 textures and thin liquids and cues to use the following precaution/strategies: slow rate, sit up at 90 degrees, small bites/sips, verify hard swallows, alternate textures, thicken liquids to nectar thick if increased aspiration signs noted.  Plan to continue Tx per plan.  Barriers to return to prior living situation: Below baseline, cognition, aspiration risk   Recommendations for discharge: TCU  Rationale for recommendations: SLP at next level of care for management of dysphagia and cog/ling deficits as appropriate; Cognitive-linguistic eval and Tx at next level of care             Entered by: Christine Mcelroy 06/17/2019 11:38 AM

## 2019-06-17 NOTE — PROGRESS NOTES
[facility] Note:    D/I:  [facility] is following for discharge planning.  SW placed call to patient's spouse Helena to discuss three TCU choices to send out referrals.  Per Palliative, family would like to have referral sent to [facility].  Sent referral via DOD. Left VM for spouse to return call.    P: SW will continue to assist for discharge.    CRISTOBAL Kemp, LGSW

## 2019-06-17 NOTE — PROGRESS NOTES
"SPIRITUAL HEALTH SERVICES  Spiritual Assessment Progress Note  [facility]  This was 's first visit with pt - previous visits have been with family.  Pt was quite talkative.  When he learned that I was a  he asked, \"Prove to me that their is a God.\"  He explained that he was an  and he always looked for proof!  Creation of the Universe is the proof for him that their is a God.  He talked about his health and named that he was going to work to stay alive - \"that's all I can do.\" He doesn't believe in life after death.  He believes in \"dirt naps\" - \"where you sleep in the dirt for a really long time.\"    Pt talked about his family and how he was a good dad. He talked about his career as a .    Pt is [employer] and was unaware that he had been given the Sacrament of the Sick when he was in the ICU.      listened to pt process his life and his illness. Provided emotional support.      continues to be available as needed for support.                                                                                                                                             Meenu Mora M.Div., [facility]  Staff    Pager [phone]  "

## 2019-06-18 ENCOUNTER — APPOINTMENT (OUTPATIENT)
Dept: SPEECH THERAPY | Facility: CLINIC | Age: 74
DRG: 871 | End: 2019-06-18
Payer: MEDICARE

## 2019-06-18 ENCOUNTER — APPOINTMENT (OUTPATIENT)
Dept: PHYSICAL THERAPY | Facility: CLINIC | Age: 74
DRG: 871 | End: 2019-06-18
Payer: MEDICARE

## 2019-06-18 ENCOUNTER — APPOINTMENT (OUTPATIENT)
Dept: OCCUPATIONAL THERAPY | Facility: CLINIC | Age: 74
DRG: 871 | End: 2019-06-18
Payer: MEDICARE

## 2019-06-18 LAB
ANION GAP SERPL CALCULATED.3IONS-SCNC: 6 MMOL/L (ref 3–14)
BACTERIA SPEC CULT: NO GROWTH
BUN SERPL-MCNC: 26 MG/DL (ref 7–30)
CALCIUM SERPL-MCNC: 8.5 MG/DL (ref 8.5–10.1)
CHLORIDE SERPL-SCNC: 110 MMOL/L (ref 94–109)
CO2 SERPL-SCNC: 24 MMOL/L (ref 20–32)
CREAT SERPL-MCNC: 1.82 MG/DL (ref 0.66–1.25)
ERYTHROCYTE [DISTWIDTH] IN BLOOD BY AUTOMATED COUNT: 15.7 % (ref 10–15)
GFR SERPL CREATININE-BSD FRML MDRD: 35 ML/MIN/{1.73_M2}
GLUCOSE SERPL-MCNC: 91 MG/DL (ref 70–99)
HCT VFR BLD AUTO: 30.8 % (ref 40–53)
HGB BLD-MCNC: 10.2 G/DL (ref 13.3–17.7)
MCH RBC QN AUTO: 31.5 PG (ref 26.5–33)
MCHC RBC AUTO-ENTMCNC: 33.1 G/DL (ref 31.5–36.5)
MCV RBC AUTO: 95 FL (ref 78–100)
PLATELET # BLD AUTO: 125 10E9/L (ref 150–450)
POTASSIUM SERPL-SCNC: 3.9 MMOL/L (ref 3.4–5.3)
RBC # BLD AUTO: 3.24 10E12/L (ref 4.4–5.9)
SODIUM SERPL-SCNC: 140 MMOL/L (ref 133–144)
SPECIMEN SOURCE: NORMAL
WBC # BLD AUTO: 6.2 10E9/L (ref 4–11)

## 2019-06-18 PROCEDURE — 36415 COLL VENOUS BLD VENIPUNCTURE: CPT | Performed by: HOSPITALIST

## 2019-06-18 PROCEDURE — 97530 THERAPEUTIC ACTIVITIES: CPT | Mod: GO | Performed by: OCCUPATIONAL THERAPY ASSISTANT

## 2019-06-18 PROCEDURE — 99233 SBSQ HOSP IP/OBS HIGH 50: CPT | Performed by: NURSE PRACTITIONER

## 2019-06-18 PROCEDURE — A9270 NON-COVERED ITEM OR SERVICE: HCPCS | Mod: GY | Performed by: PHYSICIAN ASSISTANT

## 2019-06-18 PROCEDURE — 99207 ZZC MOONLIGHTING INDICATOR: CPT | Performed by: INTERNAL MEDICINE

## 2019-06-18 PROCEDURE — 99233 SBSQ HOSP IP/OBS HIGH 50: CPT | Performed by: INTERNAL MEDICINE

## 2019-06-18 PROCEDURE — 25000132 ZZH RX MED GY IP 250 OP 250 PS 637: Mod: GY | Performed by: PHYSICIAN ASSISTANT

## 2019-06-18 PROCEDURE — 85027 COMPLETE CBC AUTOMATED: CPT | Performed by: HOSPITALIST

## 2019-06-18 PROCEDURE — 25000132 ZZH RX MED GY IP 250 OP 250 PS 637: Mod: GY | Performed by: HOSPITALIST

## 2019-06-18 PROCEDURE — 97140 MANUAL THERAPY 1/> REGIONS: CPT | Mod: GP | Performed by: PHYSICAL THERAPIST

## 2019-06-18 PROCEDURE — 97530 THERAPEUTIC ACTIVITIES: CPT | Mod: GP | Performed by: PHYSICAL THERAPIST

## 2019-06-18 PROCEDURE — 92526 ORAL FUNCTION THERAPY: CPT | Mod: GN

## 2019-06-18 PROCEDURE — 97535 SELF CARE MNGMENT TRAINING: CPT | Mod: GO | Performed by: OCCUPATIONAL THERAPY ASSISTANT

## 2019-06-18 PROCEDURE — A9270 NON-COVERED ITEM OR SERVICE: HCPCS | Mod: GY | Performed by: HOSPITALIST

## 2019-06-18 PROCEDURE — 12000000 ZZH R&B MED SURG/OB

## 2019-06-18 PROCEDURE — 80048 BASIC METABOLIC PNL TOTAL CA: CPT | Performed by: HOSPITALIST

## 2019-06-18 RX ADMIN — THERA TABS 1 TABLET: TAB at 10:14

## 2019-06-18 RX ADMIN — FOLIC ACID 1 MG: 1 TABLET ORAL at 10:15

## 2019-06-18 RX ADMIN — GABAPENTIN 300 MG: 300 CAPSULE ORAL at 10:14

## 2019-06-18 RX ADMIN — GABAPENTIN 300 MG: 300 CAPSULE ORAL at 20:46

## 2019-06-18 ASSESSMENT — ACTIVITIES OF DAILY LIVING (ADL)
ADLS_ACUITY_SCORE: 24

## 2019-06-18 ASSESSMENT — MIFFLIN-ST. JEOR: SCORE: 1979.1

## 2019-06-18 NOTE — PLAN OF CARE
Discharge Planner PT   Patient plan for discharge: None stated.   Current status: Sup>sit Min A. Unable to stand secondary to inability to fit prosthesis. BKA wrapped with short stretch bandages thigh to the bottom of the stump. Note pt has a blister at the medial thigh, petechia over the posterior surface of the L leg, two dime size bruises over the tibia, mid way down. Also, scabbing over the distal end of the stump. L LE washed, dried, lotion applied prior to wrap placement. Edu pt on signs and symptoms of intolerance.   Nursing please remove wrap if it is painful, if bandage becomes bunched up or soiled. PT to return at 3:45 tomorrow (6/19) to re-assess and re-wrap as indicated.     Barriers to return to prior living situation: Needs lift at this time for mobility, high falls risk, confusion, edema prohibiting his use of his L LE prosthesis.  Recommendations for discharge: TCU  Rationale for recommendations: Pt would benefit from continued PT to improve strength, balance, mobility to increase independence, reduce falls risk and increase safety before returning home.         Entered by: Kemi Carney 06/18/2019 5:39 PM

## 2019-06-18 NOTE — PROGRESS NOTES
[facility]    Medicine Progress Note - Hospitalist Service       Date of Admission:  6/11/2019  Assessment & Plan    Mr. Antonio Ramirez is a 74 year old man with multiple complex past medical history of HTN, HLD, CAD s/p CABG, hx of alcoholic liver cirrhosis with portal HTN and ascites s/p multiple paracenteses, systolic CHF, valvular disease, paroxysmal Afib on chronic anticoagulation with Xarelto, antiphospholipid antibody syndrome with history of DVT and PE s/p IVC filter, hx MSSA bacteremia with hx Lt BKA, traumatic subdural hematoma, who was brought in for evaluation of generalized weakness, cough and vomiting.   He has had a general decline in his overall health in the last few months with multiple falls and severely impaired mobility, subdural hematoma in 2018 causing significant impairment in cognition, among others. He had a paracentesis the day prior to admission and developed chills and possible pneumonia resulting in admission to the hospital 6/10/19. A few hours after admission he was found apneic and pulseless ultimately with PEA cardiac arrest with CPR ~4 minutes prior to ROSC, thought primary respiratory cause. Transferred to ICU where he was unresponsive and a few hours later he went into VT arrest and received 2 minutes of CPR resulting in ROSC. He has slow been more interactive with increasing cognitive function.   - 6/13/19: weaned to room air, following basic commands and asking/answering questions with some comprehensible garbled speech, transfer out of ICU. Continue GOC discussion with family.       Goals of Care:   To summarize as of 6/17, after patient was resuscitated from 2 cardiac arrests early during admission, prognosis was very poor especially given his underlying comorbidities.  At that time, care team and family agreed that not escalating care and being DNR/DNI was best.  Subsequently, I discussed with Helena, patient's spouse, regarding the overall poor prognosis with  concern of anoxia during CODE BLUE.  Over the days of 6/14 through 6/17 his cognition and orientation have improved dramatically.  Assistance from palliative care is greatly appreciated and as of 6/17 patient and family are now in agreement to return to being full code and pursue restorative cares as much as possible.  Patient is agreeable to TCU.     Encephalopathy likely due to anoxia s/p Cardiac Arrest x2, 6/11/19 - resolving  Could not rule out seizure as a cause of his acute loss of consciousness, but no epileptiform activity on EEG, it did however show severe encephalopathy on 6/11/19.   Brain MRI 6/12/19: chronic subdural collection at Rt cerebral convexity since 2/2019. Diffuse cerebral volume loss. No acute intracranial pathology.  - Resolving 6/14-->6/16 but still confused at times  - 6/17 nearly back to baseline, slight confusion noted, otherwise fully oriented     Mild/Moderate Oropharyngeal Dysphagia  - Consulted SLP - DD2/NTL, 1:1 supervision     Shock, septic vs cardiogenic   Community-acquired pneumonia versus aspiration pneumonia:    He presented with generalized weakness and coughing, which was nonproductive. Weakness is not really new and he called EMS 6 times in the last month for recurrent falls. Tmax 101.4 in ER.  CXR mentions mild right basilar atelectasis or pneumonia and Abd/Pelvic CT confirmed patchy infiltrates in the right middle lobe and lingula, which might be pneumonia with bilateral pleural effusions and associated atelectasis. Procalcitonin 0.1 and WBC 10.2 on admit. Admitted to swallowing problems sometimes PTA, so aspiration pneumonia might be a possibility.   Given 1 dose of ceftriaxone and 1 dose of doxycycline in the ER. Given the fact that he had a paracentesis the day before admission on 06/10/2019, iatrogenic peritonitis cannot be completely excluded at this time.    BC day of admit growing staph simulans likely contaminant. Other Bcx neg.  - Initially on vanc and zosyn;  de-escalated to zosyn, total 7days  - 6/17 afebrile, on RA, BP normal, antibiotics stopped     Alcoholic liver cirrhosis with recurrent ascites and portal hypertension.   Anasarca.   Alcohol abuse/dependence:    He continues to drink despite his advanced liver disease.  He had multiple paracenteses in the past, last paracentesis was done day PTA 06/10/2019 with 5.4 L of fluid removed. Presented with fever and generalized weakness, thought possibly related to pneumonia. He received 1 dose of ceftriaxone, 1 dose of doxycycline in ER.    He has been compliant with his torsemide and spironolactone.   EGD 2/2019 did not show esophageal varices, just portal hypertensive gastropathy.    - GI following - paracentesis possibly 6/18  - Holding PTA torsemide 20 mg p.o. BID and Aldactone 25 mg p.o. - likely ok to resume 6/18 or 19    Hx antiphospholipid antibody syndrome:     Hx deep venous thrombosis and pulmonary embolism:    On chronic anticoagulation with Xarelto. Status post inferior vena cava filter in place.    - Xarelto held on admission for repeat paracentesis, never restarted after cardiac arrest and OK to hold per wife until 6/17 when goals of care are now fully restorative, and AC should resume after upcoming paracentesis     Chronic systolic CHF with EF 40%-45%.   Severe aortic stenosis, moderate mitral regurgitation, moderate to severe tricuspid regurgitation.   ECHO 6/11/19: very difficult study, moderate aortic stenosis, mod decreased RV systolic function. RV moderately dilated, RA mod-severely dilated. Mild/mod MR and TR. EF 35-40%. Septal motion c/w conduction abnormality.   - diuretics held as below, resume soon - possibly 6/18  - as of 6/17, ++ peripheral edema but is saturating normally on RA; likely result of anasarca due to minimal intravascular oncotic pressure      CAD with history of 3-vessel coronary artery bypass grafting  Aortic stenosis, Thoracic aortic aneurysm without rupture    Hypertension, Dyslipidemia    - PTA Coreg 3.125mg PO BID on hold due to arrest and initial need for pressors after arrest. May be able to resume tomorrow.  - No ASA PTA due to Xarelto.   - Held PTA Demadex 20mg PO BID and Aldactone given ANSELMO; consider resuming in the next day or two if renal function continues to be stable and BP can tolerate     Paroxysmal atrial fibrillation:    On chronic anticoagulation with Xarelto PTA.   - rates continue to be controlled  - xarelto held previously at wife's request after much discussion  - as of 6/17, restorative goals re-pursued and therefore after upcoming paracentesis is done, anticoagulation should be resumed     Peripheral neuropathy:    - PTA gabapentin resumed with lower dosage given ANSELMO which is now resolving and prior decreased cognition     Anasarca, improved  Chronic Lymphedema  He does have significant lower extremity pitting edema on admission.  He uses lymphedema wrapping, although not very compliant.    - Lymphedema consulted      Generalized weakness  Seems to be an ongoing problem for him.  He called 911 six times last month because of recurrent falls. Has Lt BKA. He uses a walker or a wheelchair at home.  He lives with his wife.    - PT/OT/SW - following TCU recommended (see 6/17 goals of care transition to restorative above)     Pressure ulcer, POA  - Wound care and dressing changes per protocol     Alcohol abuse/dependence:    He reported drinking 4-5 ounces of vodka daily.  Apparently, he did not have withdrawal symptoms in the past during his hospitalizations.    - Monitor for any withdrawal symptoms  - Seen by Psychiatry during his prior hospitalization, both at that time and on this admission the patient reported that he is not interested in quitting drinking, despite his advanced liver disease.      ANSELMO (resolved) on CKD, stage III  His most recent creatinine level baseline was between 1.7 and 1.9.  Cr: 1.99 --> --> 2.53 --> 2.57 -->--> 6/17 1.8    - Tolerating PO intake, IVF stopped previously due to total body fluid up, want to avoid pulmonary edema  - Consider resuming diuretics 6/18 if stable and BP can tolerate    Diet: Combination Diet Dysphagia Diet Level 3: Advanced; Thin Liquids (water, ice chips, juice, milk gelatin, ice cream, etc) (NO STRAW)    DVT Prophylaxis: Pneumatic Compression Devices; xarelto to resume after possible upcoming paracentesis  Levin Catheter: in place, indication: Strict 1-2 Hour I&O  Code Status: Full Code      Disposition Plan   Expected discharge: TCU likely in 2-3 days   Entered: Julián Dong MD 06/18/2019, 11:18 AM       The patient's care was discussed with the Bedside Nurse, Care Coordinator/, Patient, Patient's Family and Palliative Team.    Julián Dong MD  Hospitalist Service  [facility]    ______________________________________________________________________    Interval History   Continued improvement overall.  Patient denies complaint. PATIENT IS DECISIONAL.     Data reviewed today: I reviewed all medications, new labs and imaging results over the last 24 hours. I personally reviewed no images or EKG's today.    Physical Exam   Vital Signs: Temp: 98.3  F (36.8  C) Temp src: Oral BP: 121/60   Heart Rate: 74 Resp: 16 SpO2: 94 % O2 Device: None (Room air)    Weight: 260 lbs 0 oz    Gen: NAD, pleasant, elderly  HEENT: Normocephalic, EOMI, MMM  Resp: no crackles,  no wheezes, no increased work of resp  CV: S1S2 heard, reg rhythm, reg rate, 2+ pitting pedal edema  Abdo:  nontender, full not hard, no rebound, bowel sounds present  Ext: 2+ pitting, Left stump intact, well perfused  Neuro: AAOx3, CN grossly intact, no facial asymmetry      Data   Recent Labs   Lab 06/18/19  0658 06/17/19  0736 06/16/19  0714 06/15/19  0651 06/14/19  0751 06/13/19  0539   WBC 6.2  --   --  6.6 6.3 8.5   HGB 10.2*  --   --  10.0* 9.8* 9.6*   MCV 95  --   --  95 95 98   *  --   --  103* 112* 119*   INR  --    --   --  1.67* 1.53* 1.55*    141 145* 147* 143 143   POTASSIUM 3.9 4.0 4.1 4.0 3.9 4.6   CHLORIDE 110* 112* 116* 117* 114* 111*   CO2 24 23 25 22 22 26   BUN 26 28 31* 37* 43* 49*   CR 1.82* 1.85* 1.89* 1.93* 2.30* 2.57*   ANIONGAP 6 6 4 8 7 6   BENNIE 8.5 8.3* 8.2* 8.6 8.8 8.4*   GLC 91 84 94 88 101* 88   ALBUMIN  --   --   --  2.1* 2.2* 2.1*   PROTTOTAL  --   --   --  6.0* 6.3* 6.0*   BILITOTAL  --   --   --  1.2 1.3 1.1   ALKPHOS  --   --   --  60 65 62   ALT  --   --   --  10 10 11   AST  --   --   --  24 20 22     No results found for this or any previous visit (from the past 24 hour(s)).

## 2019-06-18 NOTE — PROGRESS NOTES
[facility]    Palliative Care Progress Note    Antonio Ramirez  MRN# [id number]  Date of Admission:  6/11/2019  Date of Service (when I saw the patient): 06/18/2019    Assessment & Plan   Mr. Antonio Ramirez is a 74 year old man with multiple complex past medical history of HTN, CAD s/p CABG, hx of alcoholic liver cirrhosis with portal HTN and ascites s/p multiple paracenteses, systolic CHF, valvular disease, paroxysmal Afib on chronic anticoagulation, antiphospholipid antibody syndrome with history of DVT and PE s/p IVC filter, hx MSSA bacteremia with hx Lt BKA, traumatic subdural hematoma in 2018 causing sig cognitive decline and has had a generalized decline in his overall health in the past few months with multiple falls and severely impaired mobility and generalized weakness. He had a paracentesis the day prior to admission and developed increased weakness, cough, chills and possible pneumonia resulting in admission to the hospital on 6/11/19. A few hours after admission he was found apneic and pulseless ultimately with PEA cardiac arrest with CPR ~4 minutes prior to ROSC. Transferred to ICU where he was unresponsive and a few hours later he went into VT arrest and received 2 minutes of CPR resulting in ROSC. Since then, he has slowly been more interactive with increasing cognitive function. On 6/13/19 he was weaned to room air, following basic commands and asking/answering questions with some comprehensible garbled speech, transfer out of ICU. Sage is able to communicate some basic needs and can recognize his wife but not oriented to place and intermittently delirious and without capacity.        Symptoms/Recommendations/Discussions   Received a phone call from pt's wife this morning stating she was worried that Sage does not fully understand his decision to be a FULL code and she feels it would be more appropriate for him to be a DNR/DNI. I offered to re-visit this discussion with Sage today and  "Helena was in agreement. Sage was seen sitting up in a chair, states he is feeling well today. I asked what he understood about his current state of health, and he said he didn't feel sick. He then asked if I would share what I know about his health. I reviewed with him that his liver is sick which he understands (he stated, \"I know I have liver disease because I am a drinker.\") I reviewed with him that the GI specialists feel he is not a candidate for a liver transplant at this time due to continued alcohol use and stable liver disease. Sage laughed and stated, \"well I could buy one.\" We continued to discuss his other medical complications which include renal dysfunction and generalized edema (due to liver failure) which we can't administer diuretics for because of renal dysfunction. Pt's wife shared with him that he was declined by [facility] which surprised Sage. Considering his many medical co-morbidities I discussed with Sage his wishes with regards to his code status. I shared my worry that if he were to survive another cardiac event, he would very likely be in a very debilitated state which would likely require living the rest of his life in a nursing home receiving 24 hour cares. I asked Sage if that would be a quality of life that would be acceptable to him and he said it definitely would not be. Considering this, I strongly recommended he consider reversing his code status to DNR/DNI. Sage stated he still would want everything done to try and \"bring me back to life\" and then allow his family to make the decision of what to do. He turned to Helena and stated, \"is that alright with you?\" Helena shared with Sage how difficult it has been to be asked on a number of occassions now whether or not she should \"pull the plug\" and she did not want to have to go through this again. Sage acknowledged how difficult this has been for her, but continued to state that he would want to have attempts at resuscitation which would allow his " "family to make the decision of what to do for him. Helena was reluctantly in agreement with this plan stating, \"I guess we go with your wishes then.\"     Support/Coping  -pt's wife Helena at bedside  -Will involve Palliative LICSW, Latasha Andrews, and/or Palliative , Ariadna Hewitt    Decisional Support, Goals of Care, Counseling & Coordination  Decisional Capacity Intact?  -Yes  Health Care Directive on File?  -No  Code Status/Resuscitation Preferences?  -Full  Plan of Care?  -TCU when medically stable    Thank you for involving us in the care of this patient and family. We will continue to follow. Please do not hesitate to contact me with questions or concerns or the on-call provider for our team if evening or weekend.    Nini QUESADA, [facility]  Palliative Medicine   Pager [phone]    Attestation:  Total time on the floor involved in the patient's care: 35 minutes  Total time spent in counseling/care coordination: >50%    Interval History   No acute events overnight. Sitting up in chair, c/o of swelling of the stump which is limiting his activity as he is unable to get his prosthetic leg on.     Medications   Current Facility-Administered Medications Ordered in Epic   Medication Dose Route Frequency Last Rate Last Dose     acetaminophen (TYLENOL) Suppository 650 mg  650 mg Rectal Q4H PRN         acetaminophen (TYLENOL) tablet 650 mg  650 mg Oral Q4H PRN   650 mg at 06/16/19 1310     benzocaine-menthol (CHLORASEPTIC) 6-10 MG lozenge 1 lozenge  1 lozenge Buccal Q1H PRN   1 lozenge at 06/15/19 2340     glucose gel 15-30 g  15-30 g Oral Q15 Min PRN        Or     dextrose 50 % injection 25-50 mL  25-50 mL Intravenous Q15 Min PRN        Or     glucagon injection 1 mg  1 mg Subcutaneous Q15 Min PRN         fentaNYL (PF) (SUBLIMAZE) injection 25 mcg  25 mcg Intravenous Q15 Min PRN   25 mcg at 06/13/19 0434     folic acid (FOLVITE) tablet 1 mg  1 mg Oral Daily   1 mg at 06/18/19 1015     gabapentin (NEURONTIN) " capsule 300 mg  300 mg Oral BID   300 mg at 06/18/19 1014     glycopyrrolate (ROBINUL) injection 0.1-0.2 mg  0.1-0.2 mg Intravenous Q4H PRN   0.2 mg at 06/12/19 1407     guaiFENesin (ROBITUSSIN) 20 mg/mL solution 10 mL  10 mL Oral Q4H PRN   10 mL at 06/15/19 2346     HYDROmorphone (PF) (DILAUDID) injection 0.2-0.4 mg  0.2-0.4 mg Intravenous Q2H PRN         lidocaine (LMX4) cream   Topical Q1H PRN         lidocaine 1 % 0.1-1 mL  0.1-1 mL Other Q1H PRN         multivitamin, therapeutic (THERA-VIT) tablet 1 tablet  1 tablet Oral Daily   1 tablet at 06/18/19 1014     naloxone (NARCAN) injection 0.1-0.4 mg  0.1-0.4 mg Intravenous Q2 Min PRN         ondansetron (ZOFRAN-ODT) ODT tab 4 mg  4 mg Oral Q6H PRN        Or     ondansetron (ZOFRAN) injection 4 mg  4 mg Intravenous Q6H PRN         Patient is already receiving anticoagulation with heparin, enoxaparin (LOVENOX), warfarin (COUMADIN)  or other anticoagulant medication   Does not apply Continuous PRN         senna-docusate (SENOKOT-S/PERICOLACE) 8.6-50 MG per tablet 1 tablet  1 tablet Oral BID PRN        Or     senna-docusate (SENOKOT-S/PERICOLACE) 8.6-50 MG per tablet 2 tablet  2 tablet Oral BID PRN         sodium chloride (PF) 0.9% PF flush 3 mL  3 mL Intracatheter q1 min prn   3 mL at 06/11/19 0619     sodium chloride (PF) 0.9% PF flush 3 mL  3 mL Intracatheter Q8H   3 mL at 06/18/19 1012     sodium chloride 0.9% infusion   Intravenous Continuous 10 mL/hr at 06/16/19 1840       No current Epic-ordered outpatient medications on file.       Physical Exam   Temp: 98.3  F (36.8  C) Temp src: Oral BP: 121/60   Heart Rate: 74 Resp: 16 SpO2: 94 % O2 Device: None (Room air)    Vitals:    06/16/19 0512 06/17/19 0300 06/18/19 0004   Weight: 117.7 kg (259 lb 8 oz) 116.9 kg (257 lb 11.2 oz) 117.9 kg (260 lb)     CONSTITUTIONAL: NAD, A&Ox3. Calm and cooperative.  HEENT: NCAT, MMM  NECK: Supple  CARDIOVASCULAR: exam deferred   RESPIRATORY: NL respiratory effort on  RA  GASTROINTESTINAL: exam deferred   MUSCULOSKELETAL: Moving freely in chair  SKIN: Warm and intact. No concerning lesions or rashes on exposed skin surfaces   NEUROLOGIC: Appropriately responsive during interview  PSYCH: Affect congruent     Data   Results for orders placed or performed during the hospital encounter of 06/11/19 (from the past 24 hour(s))   Basic metabolic panel   Result Value Ref Range    Sodium 140 133 - 144 mmol/L    Potassium 3.9 3.4 - 5.3 mmol/L    Chloride 110 (H) 94 - 109 mmol/L    Carbon Dioxide 24 20 - 32 mmol/L    Anion Gap 6 3 - 14 mmol/L    Glucose 91 70 - 99 mg/dL    Urea Nitrogen 26 7 - 30 mg/dL    Creatinine 1.82 (H) 0.66 - 1.25 mg/dL    GFR Estimate 35 (L) >60 mL/min/[1.73_m2]    GFR Estimate If Black 41 (L) >60 mL/min/[1.73_m2]    Calcium 8.5 8.5 - 10.1 mg/dL   CBC with platelets   Result Value Ref Range    WBC 6.2 4.0 - 11.0 10e9/L    RBC Count 3.24 (L) 4.4 - 5.9 10e12/L    Hemoglobin 10.2 (L) 13.3 - 17.7 g/dL    Hematocrit 30.8 (L) 40.0 - 53.0 %    MCV 95 78 - 100 fl    MCH 31.5 26.5 - 33.0 pg    MCHC 33.1 31.5 - 36.5 g/dL    RDW 15.7 (H) 10.0 - 15.0 %    Platelet Count 125 (L) 150 - 450 10e9/L

## 2019-06-18 NOTE — PROGRESS NOTES
06/18/19 1700   General Information   Discipline PT   Affected Body Part(s) Left LE;Right UE  (abdomen)   Etiology Comments  alcoholic liver cirrhosis with portal HTN and ascites, CHF, hasn't been wearing his prosthesis   Pertinent history of current problem (PT: include personal factors and/or comorbidities that impact the POC; OT: include additional occupational profile info) 73 yo male adm with PNA. multiple falls and severely impaired mobility and generalized weakness. He had a paracentesis the day prior to admission and developed increased weakness, cough, chills and possible pneumonia resulting in admission to the hospital on 6/11/19. A few hours after admission he was found apneic and pulseless ultimately with PEA cardiac arrest with CPR ~4 minutes prior to ROSC. Transferred to ICU where he was unresponsive and a few hours later he went into VT arrest and received 2 minutes of CPR resulting in ROSC.    Edema Precautions Renal Insufficiency;Cardiac Edema/CHF   Pain   Patient currently in pain No   Edema Examination / Assessment   Skin Condition Pitting;Non-pitting;Dryness;Hemosiderin deposits   Skin Condition Comments below the knee edema is soft and plyable, above th knee the skin is thick firm pitting edema with multiple petechea more over the posterior surface of the lower leg. Note pt has a blister at the medial thigh, petechia over the posterior surface of the L leg, two dime size bruises over the tibia, mid way down. Also, scabbing over the distal end of the stump. L LE    ROM   Range of Motion (WFL) other (describe)   Description of Range of Motion Deficits Hip flexion and  limited by soft tissue   Strength   Strength (WFL) other (describe)   Description of Strength Deficits Impaired functional activity tolerance, generalized weakness and fatigue wtih activity and mobility   Assessment/Plan   Patient presents with Stage 2 Lymphedema   Assessment Pt will benefit from a trial of BKA quick wrap in order  to reduce volume enough to be able to wear his prostheisis for ease of transfers and gati.   Clinical Presentation Stable/Uncomplicated   Clinical Presentation Rationale see MR   Clinical Decision Making (Complexity) Low complexity   Planned Edema Interventions Gradient compression bandaging;Exercises;Education;Manual therapy   Treatment Frequency 3x/week   Treatment Duration 1 week   Patient, Family and/or Staff in agreement with plan of care. Yes   Risks and benefits of treatment have been explained. Yes   Total Evaluation Time   Total Evaluation Time (Minutes) 10  (not billed)

## 2019-06-18 NOTE — PROGRESS NOTES
SW Note:    D/I:  SW received call from Roxie with admissions at [facility] and they are not able to accept patient due to his care needs.  SW spoke with patient's spouse Helena and provided an update and spouse is requesting that additional referrals be sent to [facility] and [facility].  SW sent referrals via [facility].    Addendum:  SW received call from [facility] that they would be able to accept patient at their facility.  SW placed call to patient's spouse to update.  Left VM.     P: SW will continue to follow for discharge.    Angus Sofia, MSW, LGSW

## 2019-06-18 NOTE — PLAN OF CARE
Discharge Planner SLP   Patient plan for discharge: Did not discuss  Current status: pt tolerated regular solids and thin liquids. Adequate mastication and oral clearance, no overt s/sx of aspiration     Recommend regular solids and thin liquids, no straws. Pt to be sitting upright, order smaller more frequent meals as needed, and remain seated upright for 30-60 minutes after eating    Barriers to return to prior living situation: Cognition   Recommendations for discharge: TCU  Rationale for recommendations: Anticipate pt will meet dysphagia goals prior to discharge          Entered by: Yue Pratt 06/18/2019 2:12 PM

## 2019-06-18 NOTE — PLAN OF CARE
Patient is alert but disoriented to situation at times, orientation fluctuates and he is forgetful. Denies pain. VSS. BLE edema +2. Levin with adequate dalia output. Up with a lift and turns/repositions every 2 hours. Used bedpan for stool. Tolerating a DD3 with thin liquid diet. Wounds to mid spine and coccyx, both with mepilex CDI. Left BKA noted. Calls appropriately.

## 2019-06-18 NOTE — PROGRESS NOTES
"CLINICAL NUTRITION SERVICES  -  ASSESSMENT NOTE      Recommendations Ordered by Registered Dietitian (RD):     Will send a Boost supplement BID between meals for added protein   Will also have pt try the Gelatein PLUS supplement with lunch and dinner meals for added protein  .     Malnutrition:   % Weight Loss:  Weight loss does not meet criteria for malnutrition (wt is up with edema)  % Intake: unable to assess  Subcutaneous Fat Loss: unable to assess  Muscle Loss:  Unable to assess  Fluid Retention:  Anasarca (liver cirrhosis)    Malnutrition Diagnosis: Unable to determine due to pt not available to visit        REASON FOR ASSESSMENT  Antonio Ramirez is a 74 year old male seen by Registered Dietitian for LOS      NUTRITION HISTORY  Chart reviewed  Alcoholic liver cirrhosis   He continues to drink.  He reports drinking 4-5 ounces of vodka daily.    He is not interested in quitting drinking      CURRENT NUTRITION ORDERS  Diet Order:     DD3, thin liquids (per SLP 6/17)    Pt has been ordering small meals (a few items per meal)  Flowsheets reflect intake of %      NUTRITION FOCUSED PHYSICAL ASSESSMENT (NFPA)  Completed:  No Patient not available         Observed:    unable to assess    Obtained from Chart/Interdisciplinary Team:  Per MD - \"generalized edema (due to liver failure) which we can't administer diuretics for because of renal dysfunction.\"      ANTHROPOMETRICS  Height: 6' 2\"  Weight: 260 lbs 0 oz  Body mass index is 33.38 kg/m .  Weight Status:  Obesity Grade I BMI 30-34.9  IBW: 80.4 kg (pt with left BKA)  Weight History: large fluctuations in wt  Vitals:    06/11/19 0700 06/13/19 0400 06/14/19 0225 06/15/19 0500   Weight: 99.8 kg (220 lb 0.3 oz) 97.9 kg (215 lb 13.3 oz) 119.3 kg (263 lb) 119.5 kg (263 lb 7.2 oz)    06/16/19 0512 06/17/19 0300 06/18/19 0004   Weight: 117.7 kg (259 lb 8 oz) 116.9 kg (257 lb 11.2 oz) 117.9 kg (260 lb)     Wt Readings from Last 10 Encounters:   06/18/19 117.9 kg (260 lb) "   06/10/19 99.8 kg (220 lb)   05/31/19 99.8 kg (220 lb)   04/25/19 108 kg (238 lb)   04/18/19 107.9 kg (237 lb 12.8 oz)   04/17/19 99.8 kg (220 lb)   04/12/19 118.3 kg (260 lb 12.8 oz)   04/10/19 108.9 kg (240 lb)   04/08/19 111.6 kg (246 lb)   04/08/19 111.6 kg (246 lb)         LABS  Labs reviewed    MEDICATIONS  Multivitamin       ASSESSED NUTRITION NEEDS PER APPROVED PRACTICE GUIDELINES:    Dosing Weight 80.4 kg (IBW)  Estimated Energy Needs: 0975-8191 kcals (25-30 Kcal/Kg)  Justification: maintenance  Estimated Protein Needs:  grams protein (1.2-1.5 g pro/Kg)  Justification: preservation of lean body mass    MALNUTRITION:  % Weight Loss:  Weight loss does not meet criteria for malnutrition (wt is up with edema)  % Intake: unable to assess  Subcutaneous Fat Loss: unable to assess  Muscle Loss:  Unable to assess  Fluid Retention:  Anasarca (liver cirrhosis)    Malnutrition Diagnosis: Unable to determine due to pt not available to visit      NUTRITION DIAGNOSIS:  Increased nutrient needs (protein) related to higher demand with liver dz as evidenced by pt with liver cirrhosis      NUTRITION INTERVENTIONS  Recommendations / Nutrition Prescription  DD3, thin liquids (per SLP)  Nutrition supplements  .      Implementation  Nutrition education: Per Provider order if indicated   Will send a Boost supplement BID between meals for added protein (each provides 360 cals,, 14 gm pro)  Will also have pt try the Gelatein PLUS supplement with lunch and dinner meals for added protein (each provides 150 cals, 20 gm pro)  .      Nutrition Goals  Pt to consume 100% of the supplements  .      MONITORING AND EVALUATION:  Progress towards goals will be monitored and evaluated per protocol and Practice Guidelines

## 2019-06-18 NOTE — PLAN OF CARE
Discharge Planner OT   Patient plan for discharge: Not stated  Current status: pt SBA supine to sit EOB, dependent of ceiling lift to complete bed to chair transfer, MD wanting a cognitive assessment completed, Pt completed cognitive assessment SLUMS with pt score of 19/30 indicates dementia level, impairments noted with STM, attention and executive functions. Pt wanting to call his spouse, after 2 attempts to dial phone pt required assist with completing dialing of phone number.   Barriers to return to prior living situation: Level of A required, impaired mobility and ADL  Recommendations for discharge: TCU per plan established by the Occupational Therapist  Rationale for recommendations: Pt will require continued therapy intervention to improve safety and indep w/ ADL's and functional mobility prior to returning home.         Entered by: Saba Wang 06/18/2019 11:02 AM

## 2019-06-18 NOTE — PROGRESS NOTES
"[facility]  [facility]  Gastroenterology Progress note    Interval History:    Patient with continued episodes of disorientation.  Denies pain.  Tolerating diet.  Continued improvement of creatinine.  Plan for discharge when bed available.    Vital Signs:      /60 (BP Location: Left arm)   Pulse 70   Temp 98.3  F (36.8  C) (Oral)   Resp 16   Ht 1.88 m (6' 2\")   Wt 117.9 kg (260 lb)   SpO2 94%   BMI 33.38 kg/m    Temp (24hrs), Av  F (36.7  C), Min:97.7  F (36.5  C), Max:98.3  F (36.8  C)    Patient Vitals for the past 72 hrs:   Weight   19 0004 117.9 kg (260 lb)   19 0300 116.9 kg (257 lb 11.2 oz)   19 0512 117.7 kg (259 lb 8 oz)       Intake/Output Summary (Last 24 hours) at 2019 0854  Last data filed at 2019 0529  Gross per 24 hour   Intake --   Output 625 ml   Net -625 ml         Constitutional: NAD, comfortable  Cardiovascular: RRR, normal S1, S2   Respiratory: CTAB  Abdomen: soft, non-tender, distended    Additional Comments:  ROS, FH, SH: See initial GI consult for details.    Laboratory Data:  Recent Labs   Lab Test 06/18/19  0658 06/15/19  0651 19  07519  0539   WBC 6.2 6.6 6.3 8.5   HGB 10.2* 10.0* 9.8* 9.6*   MCV 95 95 95 98   * 103* 112* 119*   INR  --  1.67* 1.53* 1.55*     Recent Labs   Lab Test 19  0658 19  0736 19  0714    141 145*   POTASSIUM 3.9 4.0 4.1   CHLORIDE 110* 112* 116*   CO2 24 23 25   BUN 26 28 31*   CR 1.82* 1.85* 1.89*   ANIONGAP 6 6 4   BENNIE 8.5 8.3* 8.2*     Recent Labs   Lab Test 06/15/19  0651 19  0751 19  0539  19  0220  19  1320 19  1635 19  1625  18  0714  18  0737   ALBUMIN 2.1* 2.2* 2.1*   < >  --    < >  --   --  2.7*   < > 3.0*   < > 2.8*   BILITOTAL 1.2 1.3 1.1   < >  --    < >  --   --  2.9*   < > 0.8   < > 1.5*   DBIL  --   --   --   --   --   --   --   --  1.4*  --  0.5*  --  0.7*   ALT 10 10 11   < " >  --    < >  --   --  11   < > 10   < > 16   AST 24 20 22   < >  --    < >  --   --  24   < > 25   < > 41   ALKPHOS 60 65 62   < >  --    < >  --   --  90   < > 65   < > 89   PROTEIN  --   --   --   --  10*  --  10* 10*  --    < >  --    < >  --     < > = values in this interval not displayed.         Assessment:  75 yo male with alcoholic liver disease with continued alcohol consumption, ascites related to portal hypertension and malnutrition who is admitted with pneumonia.  He has had a complicated course with respiratory and cardiac arrest.  Patient was compassionately extubated but improved from a cognitive standpoint.  He continues to have a neurological deficit but is continuing to improve.  He has reaccumulating ascites, however, the patient's acute kidney injury precludes the use of diuretics.  He would not be a transplant candidate due to continued alcohol use and his liver disease is quite stable at this point.  Plan:  -  Therapeutic paracentesis with albumin replacement as needed.  -  Diuretics being held due to acute kidney injury.  Consider restarting when kidney function tests improved.  -  Family has chosen to go ahead with restorative treatment at this time.  -  Patient is not a candidate for liver transplant due to continued alcohol abuse and currently stable liver disease.  -  Diet as tolerated.      NOREEN Masters  [facility]  Office:  [phone] call if needed after 5PM  Cell:  [phone], not available after 5PM at this number

## 2019-06-19 VITALS
RESPIRATION RATE: 16 BRPM | HEART RATE: 70 BPM | SYSTOLIC BLOOD PRESSURE: 119 MMHG | WEIGHT: 255 LBS | BODY MASS INDEX: 32.73 KG/M2 | DIASTOLIC BLOOD PRESSURE: 61 MMHG | TEMPERATURE: 98 F | HEIGHT: 74 IN | OXYGEN SATURATION: 91 %

## 2019-06-19 LAB
ALBUMIN SERPL-MCNC: 2.1 G/DL (ref 3.4–5)
ALP SERPL-CCNC: 58 U/L (ref 40–150)
ALT SERPL W P-5'-P-CCNC: 9 U/L (ref 0–70)
ANION GAP SERPL CALCULATED.3IONS-SCNC: 3 MMOL/L (ref 3–14)
AST SERPL W P-5'-P-CCNC: 20 U/L (ref 0–45)
BILIRUB SERPL-MCNC: 0.6 MG/DL (ref 0.2–1.3)
BUN SERPL-MCNC: 28 MG/DL (ref 7–30)
CALCIUM SERPL-MCNC: 8.6 MG/DL (ref 8.5–10.1)
CHLORIDE SERPL-SCNC: 111 MMOL/L (ref 94–109)
CO2 SERPL-SCNC: 26 MMOL/L (ref 20–32)
CREAT SERPL-MCNC: 1.77 MG/DL (ref 0.66–1.25)
ERYTHROCYTE [DISTWIDTH] IN BLOOD BY AUTOMATED COUNT: 15.8 % (ref 10–15)
GFR SERPL CREATININE-BSD FRML MDRD: 36 ML/MIN/{1.73_M2}
GLUCOSE SERPL-MCNC: 93 MG/DL (ref 70–99)
HCT VFR BLD AUTO: 33.1 % (ref 40–53)
HGB BLD-MCNC: 10.7 G/DL (ref 13.3–17.7)
MCH RBC QN AUTO: 30.7 PG (ref 26.5–33)
MCHC RBC AUTO-ENTMCNC: 32.3 G/DL (ref 31.5–36.5)
MCV RBC AUTO: 95 FL (ref 78–100)
PLATELET # BLD AUTO: 131 10E9/L (ref 150–450)
POTASSIUM SERPL-SCNC: 3.9 MMOL/L (ref 3.4–5.3)
PROT SERPL-MCNC: 6.4 G/DL (ref 6.8–8.8)
RBC # BLD AUTO: 3.49 10E12/L (ref 4.4–5.9)
SODIUM SERPL-SCNC: 140 MMOL/L (ref 133–144)
WBC # BLD AUTO: 6.2 10E9/L (ref 4–11)

## 2019-06-19 PROCEDURE — 25000132 ZZH RX MED GY IP 250 OP 250 PS 637: Mod: GY | Performed by: HOSPITALIST

## 2019-06-19 PROCEDURE — 85027 COMPLETE CBC AUTOMATED: CPT | Performed by: INTERNAL MEDICINE

## 2019-06-19 PROCEDURE — A9270 NON-COVERED ITEM OR SERVICE: HCPCS | Mod: GY | Performed by: PHYSICIAN ASSISTANT

## 2019-06-19 PROCEDURE — 99239 HOSP IP/OBS DSCHRG MGMT >30: CPT | Performed by: INTERNAL MEDICINE

## 2019-06-19 PROCEDURE — A9270 NON-COVERED ITEM OR SERVICE: HCPCS | Mod: GY | Performed by: HOSPITALIST

## 2019-06-19 PROCEDURE — 80053 COMPREHEN METABOLIC PANEL: CPT | Performed by: INTERNAL MEDICINE

## 2019-06-19 PROCEDURE — 25000132 ZZH RX MED GY IP 250 OP 250 PS 637: Mod: GY | Performed by: PHYSICIAN ASSISTANT

## 2019-06-19 PROCEDURE — 36415 COLL VENOUS BLD VENIPUNCTURE: CPT | Performed by: INTERNAL MEDICINE

## 2019-06-19 RX ORDER — GABAPENTIN 300 MG/1
300 CAPSULE ORAL 2 TIMES DAILY
DISCHARGE
Start: 2019-06-19 | End: 2019-09-04

## 2019-06-19 RX ORDER — MULTIVITAMIN,THERAPEUTIC
1 TABLET ORAL DAILY
DISCHARGE
Start: 2019-06-20 | End: 2020-01-06

## 2019-06-19 RX ADMIN — GABAPENTIN 300 MG: 300 CAPSULE ORAL at 08:37

## 2019-06-19 RX ADMIN — THERA TABS 1 TABLET: TAB at 08:37

## 2019-06-19 RX ADMIN — FOLIC ACID 1 MG: 1 TABLET ORAL at 08:37

## 2019-06-19 ASSESSMENT — ACTIVITIES OF DAILY LIVING (ADL)
ADLS_ACUITY_SCORE: 24

## 2019-06-19 ASSESSMENT — MIFFLIN-ST. JEOR: SCORE: 1956.42

## 2019-06-19 NOTE — PLAN OF CARE
Disoriented to time, place and situation at times, fluctuates. VSS on room air. Denied pain. Repositioned q2h. Left BKA. Legs elevated on pillows, +2/3 edema, unable to fit left prosthetic leg on d/t edema. Levin patent. Incontinent of BM. Regular diet, upright for meals with supervision. Plan for discharge to TCU.

## 2019-06-19 NOTE — PROGRESS NOTES
Gastroenterology Note    Continued occasional episodes of disorientation.  Tolerating diet.  Plan for discharge to TCU today.  Labs stable.  Creatinine gradually improving.  Would recommend restarting diuretics when back to baseline.  Patient can continue to have therapeutic paracentesis with albumin replacement as needed.     Recommend outpatient follow up in liver clinic.  Patient is not a candidate for liver transplant due to continued alcohol abuse and currently stable liver disease.    Regina Gonzalez PA-C  [facility]  [phone]

## 2019-06-19 NOTE — PLAN OF CARE
Kidney function improving so ok to discharge today to [facility] TCU. Patient alert but disoriented at times. Incontinent of bowel and bladder since pulling lainez. T/R q 2 hours.  Wife at bedside and aware of plan. Belongings sent with her and patient via [facility] stretcher.

## 2019-06-19 NOTE — PLAN OF CARE
VSS, afebrile, denies pain, Up w-lift, unable to wear prosthetic d/t severe 3rd spacing of fluid. PT wrapped. He is bothered by incont stools, check with turns q2h, and change or offer bedpan/lift to comode. Has been pleasant today, remains full code, and will discharge to TCU when able to go. [facility] declined him, new referrals sent.

## 2019-06-19 NOTE — PROGRESS NOTES
Patient care was assumed this morning, patient was seen and examined.  Patient is sitting in bed, feeling well, denying any discomfort other than some chest pain which is going on since his resuscitation on palpation.  We discussed about this is more musculoskeletal and probably will go away in the next few weeks.  All the records and notes were reviewed in detail.  Patient was seen by palliative team again yesterday at the request of his wife regarding his CODE STATUS but at this time patient wishes to be full code.  Discussed with bedside RN regarding removing Levin catheter.  I reviewed with patient regarding slow improvement in his renal functions and probably holding the diuretics at this point for couple of more days and then getting the BMP checked in rehab and then starting him slowly on diuretics back.  I am starting patient back on his anticoagulation with Xarelto. Patient was in agreement with the plan .  He wanted to go to [facility] for rehab but is aware that they have declined patient we do have a bed in [facility], discussed with patient that once we get hold of his wife who is also in agreement we will plan to discharge him later in the day.    Detailed note to follow later.  Mariely Hogue MD, FACP.  [facility] medicine.

## 2019-06-19 NOTE — PLAN OF CARE
OT: pt not seen this AM as with  and did not want to be interrupted. Plan is for discharge to TCU this afternoon therefore further skilled OT intervention will be deferred to next level of care.     Occupational Therapy Discharge Summary    Reason for therapy discharge:    Discharged to transitional care facility.    Progress towards therapy goal(s). See goals on Care Plan in [facility] electronic health record for goal details.    GOALS:  not met due to limited tolerance for therapy and discharge from hospital    Therapy recommendation(s):    Continued therapy is recommended.  Rationale/Recommendations:  to progress independence and safety with mobilities and ADLs prior to return home.

## 2019-06-19 NOTE — PROGRESS NOTES
SW Discharge Note:    Care Transition Initial Assessment - SW     Met with: Patient    Active Problems:    PNA (pneumonia)       DATA  Lives With: (P) spouse   Living Arrangements: house  Quality of Family Relationships: (P) helpful, involved, supportive  Description of Support System: (P) Supportive, Involved  Who is your support system?: (P) Wife, Children  Support Assessment: (P) Adequate family and caregiver support, Adequate social supports.   Identified issues/concerns regarding health management:       FAHEEM is following for discharge planning for patient.  Bedside nurse updated SW that patient would likely be ready for discharge today. SW received call back from spouse Helena who stated that she is okay with patient going to [facility] today.  FAHEEM spoke with Yoly at [facility] and they are able to have patient arrive after 3:00 today.  Bedside nurse recommended that patient discharge via Stretcher transport due to confusion, BKA, lift dependent, and being unable to stand secondary to inability to fit prosthesis. FAHEEM placed call to [facility] to arranged for stretcher transport at 2:30 today.  PCS form completed, faxed to  and provided to [facility].  FAHEEM placed call to patient's spouse to update and she is in agreement with discharge plan.  Updated facility and faxed the orders.    PAS-RR    D: Per DHS regulation, SW completed and submitted PAS-RR to [place] [employer] Direct Connect via the Senior LinkAge Line.  PAS-RR confirmation # is : [id number].    I: SW spoke with spouse and they are aware a PAS-RR has been submitted.  SW reviewed with spouse that they may be contacted for a follow up appointment within 10 days of hospital discharge if their SNF stay is < 30 days.  Contact information for [facility] Line was also provided.    A: Spouse verbalized understanding.    P: Further questions may be directed to [facility] LinkAge Line at #[phone], option #4 for PAS-RR staff.       Resources List: (P) Skilled Nursing  Facility  Other Resources: (P) Transportation Services  Quality of Family Relationships: (P) helpful, involved, supportive  Transportation Anticipated: (P) agency    ASSESSMENT  Cognitive Status: alert and oriented  Concerns to be addressed: Discharge planning.     PLAN  Financial costs for the patient includes: Discussed transportation costs with spouse and that we can not guarantee that it would be covered by insurance.  Spouse in understanding.   Patient given options and choices for discharge: Yes  Patient/family is agreeable to the plan?  Yes  Transportation/person available to transport on day of discharge  is [facility] and have they been notified/set up.  Patient Goals and Preferences: NELSON Elias  Patient anticipates discharging to:  CRISTOBAL Moreno, LGSW

## 2019-06-20 ENCOUNTER — PATIENT OUTREACH (OUTPATIENT)
Dept: CARE COORDINATION | Facility: CLINIC | Age: 74
End: 2019-06-20

## 2019-06-20 ENCOUNTER — NURSING HOME VISIT (OUTPATIENT)
Dept: GERIATRICS | Facility: CLINIC | Age: 74
End: 2019-06-20
Payer: MEDICARE

## 2019-06-20 VITALS
HEART RATE: 62 BPM | RESPIRATION RATE: 16 BRPM | WEIGHT: 253 LBS | SYSTOLIC BLOOD PRESSURE: 118 MMHG | DIASTOLIC BLOOD PRESSURE: 70 MMHG | OXYGEN SATURATION: 90 % | HEIGHT: 74 IN | BODY MASS INDEX: 32.47 KG/M2 | TEMPERATURE: 97 F

## 2019-06-20 DIAGNOSIS — K70.31 ALCOHOLIC CIRRHOSIS OF LIVER WITH ASCITES (H): ICD-10-CM

## 2019-06-20 DIAGNOSIS — N18.30 CKD (CHRONIC KIDNEY DISEASE) STAGE 3, GFR 30-59 ML/MIN (H): ICD-10-CM

## 2019-06-20 DIAGNOSIS — G93.40 ENCEPHALOPATHY: ICD-10-CM

## 2019-06-20 DIAGNOSIS — I25.10 CORONARY ARTERY DISEASE INVOLVING NATIVE CORONARY ARTERY OF NATIVE HEART WITHOUT ANGINA PECTORIS: Primary | ICD-10-CM

## 2019-06-20 DIAGNOSIS — R41.89 COGNITIVE IMPAIRMENT: ICD-10-CM

## 2019-06-20 DIAGNOSIS — I50.22 CHRONIC SYSTOLIC CONGESTIVE HEART FAILURE (H): ICD-10-CM

## 2019-06-20 DIAGNOSIS — I48.0 PAROXYSMAL ATRIAL FIBRILLATION (H): ICD-10-CM

## 2019-06-20 DIAGNOSIS — R13.10 DYSPHAGIA, UNSPECIFIED TYPE: ICD-10-CM

## 2019-06-20 DIAGNOSIS — I10 ESSENTIAL HYPERTENSION: ICD-10-CM

## 2019-06-20 DIAGNOSIS — I25.5 ISCHEMIC CARDIOMYOPATHY: ICD-10-CM

## 2019-06-20 PROCEDURE — 99310 SBSQ NF CARE HIGH MDM 45: CPT | Performed by: NURSE PRACTITIONER

## 2019-06-20 ASSESSMENT — MIFFLIN-ST. JEOR: SCORE: 1947.35

## 2019-06-20 NOTE — PROGRESS NOTES
"Clinic Care Coordination Contact      Care Coordination Transition Communication    Clinical Data: Patient was hospitalized at [facility] from 6/11/19 to 6/19/19. His discharge diagnoses are as follows:    Anoxic encephalopathy, status post cardiac arrest, resolved.    Cardiac arrest x2, during hospitalization.    Mild to moderate oropharyngeal dysphagia, improving.    Shock septic versus cardiogenic, resolved.    Community-acquired pneumonia versus aspiration pneumonia.    Alcoholic liver cirrhosis with recurrent ascites.    History of portal hypertension.    Anasarca from liver disease.    Acute kidney injury on chronic kidney disease, stage III.    Ongoing alcohol abuse/dependence.    History of antiphospholipid antibody syndrome.    History of DVT and pulmonary embolism, on anticoagulation.    Chronic systolic congestive heart failure with ejection fraction of 40 to 45%, compensated.    Coronary artery disease with history of three-vessel CAD bypass grafting.    Aortic stenosis, thoracic aortic aneurysm without rupture.    Essential hypertension.    Dyslipidemia.    Paroxysmal atrial fibrillation.    Peripheral neuropathy.    Chronic lymphedema.    Generalized weakness.    Pressure ulcer.     During hospitalization, consultations with Palliative Care, Hospice Care, Gastroenterology were made. Patient had 2 cardiac arrests in this admission and code status discussion took place more than once, shifting from FULL code to DNR/DNI and back to FULL code . Finally, \"Sage stated he still would want everything done to try and \"bring me back to life\" and then allow his family to make the decision of what to do\", according to Palliative Nurse Practitioner's note from 6/18/19.    Transition to Facility:  Facility Name: [facility]  Contact name and phone number: Delmis Hameed Sara or Karyn, LSWs, [phone].    Plan:   RN Care Coordinator will await notification from facility's staff informing of patient's discharge " plans/needs. RN Care Coordinator will review chart and outreach to facility's staff every 4 weeks and/or as needed.     Diane Chaidez RN Care Coordinator  [facility]  Phone:  [phone] (Mondays, Wednesdays & Fridays)  Phone:  [phone] (Tuesdays & Thursdays)  Email: [email or web address]

## 2019-06-20 NOTE — PROGRESS NOTES
[facility]  PRIMARY CARE PROVIDER AND CLINIC:  Main Hardy MD, [address] / [place] [address]  Chief Complaint   Patient presents with     Hospital F/U     [facility] Record Number:  [id number]  Place of Service where encounter took place:  [facility] ([facility] [[id number]]    Antonio Ramirez  is a 74 year old  (1945), past medical history of HTN, HLD, CAD s/p CABG, hx of alcoholic liver cirrhosis with portal HTN and ascites s/p multiple paracenteses, systolic CHF, valvular disease, paroxysmal Afib on chronic anticoagulation with Xarelto, antiphospholipid antibody syndrome with history of DVT and PE s/p IVC filter, hx MSSA bacteremia with hx Lt BKA, traumatic subdural hematoma, who was brought in for evaluation of generalized weakness, cough and vomiting.  admitted to the above facility from  [facility]. Hospital stay 6/11/19 through 6/19/19..  Admitted to this facility for  rehab, medical management and nursing care.    HPI:    HPI information obtained from: facility chart records, facility staff, patient report and [facility] chart review.   Brief Summary of Hospital Course:   Cardiac arrest: see hospital note, cardiac arrest X 2, resucitated unresponsive in ICU put slowly mentation improved to baseline, but at baseline patient has severe cognitive impairment from Subdural hematoma in 2018, palliative care involved in case throughout stay, wife Helena has opted for full code in light if improvement in cognition.   Alcoholic cirrhosis/ascites: patient had paracentesis 1 day prior to admission and developed chills and possible pneumonia: per hospital note patient continues to drink despite advanced liver disease, multiple paracentesis in past, EGD on 2/2019 shows esophageal varices, patient compliant with torsemide and aldactone, last paracentesis 6/14/19 during hospitalization  Shock septic vs cardiogenic: CAP vs aspiration pneumonia: presented  "with general decline over past few months, IV vanc and zosyn to just zosyn for a total of 7 days IV Abx course complete in hospital.   Dysphagia: SLP see hospital notes, patient discharged on regular diet  Encephalopathy: see hospital note as follows:   Encephalopathy likely due to anoxia s/p Cardiac Arrest x2, 6/11/19 -resolved   could not rule out seizure as a cause of his acute loss of consciousness, but no epileptiform activity on EEG, it did however show severe encephalopathy on 6/11/19.   Brain MRI 6/12/19: chronic subdural collection at Rt cerebral convexity since 2/2019. Diffuse cerebral volume loss. No acute intracranial pathology.  - Resolving 6/14-->6/16 but still confused at times  - 6/19 back to baseline,does seem to have poor insight regarding his health  Antiphospholipid antibody syndrome: hx of DVT and PE, on chronic AC xarelto, has inferior vena cava filter in place  CHF/CAD, aortic stenosis/thoracic aneurysm without rupture: holding torsemide and aldactone due to elevated creat BP soft but coreg restarted at discharge  ANSELMO/CKD: baseline creat 1.7-1.9...baseline by discharge, consider resuming diuretics if BP can tolerate    Updates on Status Since Skilled nursing Admission: On exam today patient is laying in bed, working with therapy, just incontinent of stool, states has chest pain from compressions, denies any other pain at time of exam, patient denies fever, chills, cough, congestion, SOB, palpitations, N/V/D he is very deconditioning, therapy able to do a slide board transfer today with heavy assist. Patient states he slept \"on and off\" last night and appetite is good.     CODE STATUS/ADVANCE DIRECTIVES DISCUSSION:   CPR/Full code   Patient's living condition: lives with spouse  ALLERGIES: Ciprofloxacin and Hmg-coa-r inhibitors  PAST MEDICAL HISTORY:  has a past medical history of Alcoholism (H), Amputated left leg (H), Anemia, Anticardiolipin antibody syndrome (H), Antiphospholipid antibody " syndrome (H), Antiplatelet or antithrombotic long-term use, Aortic root dilatation (H), Aortic stenosis, Arthritis, Balance problem, Coronary artery disease, Gastro-oesophageal reflux disease, Heart attack (H) (2007), Hiatal hernia, Hypercholesterolemia, Hypertension, Ischemic cardiomyopathy, Left foot drop, Liver disease, 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  PAST SURGICAL HISTORY:   has a past surgical history that includes appendectomy; Endoscopic stripping vein(s); Cardiac surgery; IR IVC Filter Placement; colonoscopy; Eye surgery; Cholecystectomy; esophagogastroduodenoscopy; orthopedic surgery; orthopedic surgery; orthopedic surgery; Arthroplasty knee (Right, 9/19/2014); hernia repair; Abdomen surgery; Cystoscopy, dilate urethra, combined (N/A, 10/12/2016); Amputate leg below knee (Left, 04/2014); Incision and drainage lower extremity, combined (Left, 12/1/2017); Amputate leg below knee (Left, 12/4/2017); Irrigation and debridement lower extremity, combined (Left, 12/6/2017); Apply Wound Vac (Left, 12/6/2017); Irrigation and debridement lower extremity, combined (Left, 12/8/2017); Craniotomy (Right, 4/7/2018); Laser holmium lithotripsy ureter(s), insert stent, combined (Bilateral, 6/28/2018); Combined cystoscopy, retrogrades, exchange stent ureter(s) (Left, 7/24/2018); Cystoscopy, remove stent(s), combined (Right, 7/24/2018); Laser holmium lithotripsy ureter(s), insert stent, combined (Left, 8/14/2018); Arthroplasty hip (Right, 11/27/2018); and Esophagoscopy, gastroscopy, duodenoscopy (EGD), combined (N/A, 3/11/2019).  FAMILY HISTORY: family history includes  Heart Failure in his father; Lung Cancer in his mother.  SOCIAL HISTORY:   reports that he has never smoked. He has never used smokeless tobacco. He reports that he drinks alcohol. He reports that he does not use drugs.    Post Discharge Medication Reconciliation Status: discharge medications reconciled, continue medications without change    Current Outpatient Medications   Medication Sig Dispense Refill     carvedilol (COREG) 3.125 MG tablet TAKE 1 TABLET BY MOUTH TWICE DAILY WITH MEALS 180 tablet 3     gabapentin (NEURONTIN) 300 MG capsule Take 1 capsule (300 mg) by mouth 2 times daily       multivitamin, therapeutic (THERA-VIT) TABS tablet Take 1 tablet by mouth daily       rivaroxaban ANTICOAGULANT (XARELTO) 15 MG TABS tablet Take 1 tablet (15 mg) by mouth daily (with dinner)       senna-docusate (SENOKOT-S/PERICOLACE) 8.6-50 MG tablet Take 1 tablet by mouth daily as needed Hold for loose stools. 1180 tablet 3     vitamin B1 (THIAMINE) 100 MG tablet Take 1 tablet (100 mg) by mouth daily         ROS:  10 point ROS of systems including Constitutional, Eyes, Respiratory, Cardiovascular, Gastroenterology, Genitourinary, Integumentary, Musculoskeletal, Psychiatric were all negative except for pertinent positives noted in my HPI.    Vitals:  There were no vitals taken for this visit.  Exam:  GENERAL APPEARANCE:  Alert, in no distress  ENT:  Mouth and posterior oropharynx normal, moist mucous membranes, [place]  EYES:  EOM, conjunctivae, lids, pupils and irises normal, PERRL  RESP:  respiratory effort and palpation of chest normal, lungs clear to auscultation , no respiratory distress  CV:  Palpation and auscultation of heart done , regular rate and rhythm, no murmur, rub, or gallop, peripheral edema 1+ in RLE, trace to LLE BKA  ABDOMEN:  normal bowel sounds, soft, nontender, no hepatosplenomegaly or other masses  M/S:   Examination of:   right upper extremity, left upper extremity and right lower extremity  Inspection,  ROM, stability and muscle strength normal and left BKA, strength generalized weakness noted  SKIN:  Inspection of skin and subcutaneous tissue baseline, buttocks/coccyx reddened and inflammed  NEURO:   Cranial nerves 2-12 are normal tested and grossly at patient's baseline, speech wNl  PSYCH:  affect and mood normal    Lab/Diagnostic data:  Recent labs in [facility] reviewed by me today.     ASSESSMENT/PLAN:  Coronary artery disease involving native coronary artery of native heart without angina pectoris h/o CABG  Ischemic cardiomyopathy  Chronic systolic congestive heart failure (H)  Paroxysmal atrial fibrillation (H)  Essential hypertension  Acute/ongoing: PT and OT for strengthening, patient continues to want full code, palliative care consulted and followed during hospitalization  Continue coreg 3.125mg BID and xarelto 15mg QD  start torsemide 20mg QAM and aldactone 12.5mg at noon PTA torsemide 20mg BID and aldactone 12.5mg QD)    CKD (chronic kidney disease) stage 3, GFR 30-59 ml/min (H)  Ongoing: baseline creat 1.7-1.9  BMP twice weekly    Alcoholic cirrhosis of liver with ascites (H)  Ongoing: patient continues to drink alcohol, not interested in quitting, last paracentesis 6/14/19  Will restart torsemide and aldactone today torsemide 20mg QAM and aldactone 12.5mg at noon  Continue thiamine 100mg QD and add folic acid 1mg QD    Encephalopathy  Cognitive impairment  Ongoing: patient back to baseline cognition per hospital report, PT and OT for evaluation    Dysphagia, unspecified type  Ongoing: ST during hospitalization cleared for regular diet with thin liquids, may have ST eval here if concerns       Orders written by provider at facility  BMP twice weekly mon and thurs  CBC weekly on monday  Start torsemide 20mg QAM and aldactone 12.5mg at noon, start tomorrow  Nystatin powder to bottom BID until clear  WBAT      Total time spent with patient visit at the skilled nursing facility was 45 min including patient  visit and review of past records. Greater than 50% of total time spent with counseling and coordinating care due to update orders  Electronically signed by:  Tonya Lynn Haase, APRN CNP

## 2019-06-20 NOTE — LETTER
6/20/2019        RE: Antonio Ramirez  [address]  [place] [address]        [facility]  PRIMARY CARE PROVIDER AND CLINIC:  Main Hardy MD, [address] / [place] [address]  Chief Complaint   Patient presents with     Hospital F/U     [facility] Record Number:  [id number]  Place of Service where encounter took place:  [facility] ([facility]) [[id number]]    Antonio Ramirez  is a 74 year old  (1945), past medical history of HTN, HLD, CAD s/p CABG, hx of alcoholic liver cirrhosis with portal HTN and ascites s/p multiple paracenteses, systolic CHF, valvular disease, paroxysmal Afib on chronic anticoagulation with Xarelto, antiphospholipid antibody syndrome with history of DVT and PE s/p IVC filter, hx MSSA bacteremia with hx Lt BKA, traumatic subdural hematoma, who was brought in for evaluation of generalized weakness, cough and vomiting.  admitted to the above facility from  [facility]. Hospital stay 6/11/19 through 6/19/19..  Admitted to this facility for  rehab, medical management and nursing care.    HPI:    HPI information obtained from: facility chart records, facility staff, patient report and [facility] chart review.   Brief Summary of Hospital Course:   Cardiac arrest: see hospital note, cardiac arrest X 2, resucitated unresponsive in ICU put slowly mentation improved to baseline, but at baseline patient has severe cognitive impairment from Subdural hematoma in 2018, palliative care involved in case throughout stay, wife Helena has opted for full code in light if improvement in cognition.   Alcoholic cirrhosis/ascites: patient had paracentesis 1 day prior to admission and developed chills and possible pneumonia: per hospital note patient continues to drink despite advanced liver disease, multiple paracentesis in past, EGD on 2/2019 shows esophageal varices, patient compliant with torsemide and aldactone, last paracentesis 6/14/19  "during hospitalization  Shock septic vs cardiogenic: CAP vs aspiration pneumonia: presented with general decline over past few months, IV vanc and zosyn to just zosyn for a total of 7 days IV Abx course complete in hospital.   Dysphagia: SLP see hospital notes, patient discharged on regular diet  Encephalopathy: see hospital note as follows:   Encephalopathy likely due to anoxia s/p Cardiac Arrest x2, 6/11/19 -resolved   could not rule out seizure as a cause of his acute loss of consciousness, but no epileptiform activity on EEG, it did however show severe encephalopathy on 6/11/19.   Brain MRI 6/12/19: chronic subdural collection at Rt cerebral convexity since 2/2019. Diffuse cerebral volume loss. No acute intracranial pathology.  - Resolving 6/14-->6/16 but still confused at times  - 6/19 back to baseline,does seem to have poor insight regarding his health  Antiphospholipid antibody syndrome: hx of DVT and PE, on chronic AC xarelto, has inferior vena cava filter in place  CHF/CAD, aortic stenosis/thoracic aneurysm without rupture: holding torsemide and aldactone due to elevated creat BP soft but coreg restarted at discharge  ANSELMO/CKD: baseline creat 1.7-1.9...baseline by discharge, consider resuming diuretics if BP can tolerate    Updates on Status Since Skilled nursing Admission: On exam today patient is laying in bed, working with therapy, just incontinent of stool, states has chest pain from compressions, denies any other pain at time of exam, patient denies fever, chills, cough, congestion, SOB, palpitations, N/V/D he is very deconditioning, therapy able to do a slide board transfer today with heavy assist. Patient states he slept \"on and off\" last night and appetite is good.     CODE STATUS/ADVANCE DIRECTIVES DISCUSSION:   CPR/Full code   Patient's living condition: lives with spouse  ALLERGIES: Ciprofloxacin and Hmg-coa-r inhibitors  PAST MEDICAL HISTORY:  has a past medical history of Alcoholism (H), " Amputated left leg (H), Anemia, Anticardiolipin antibody syndrome (H), Antiphospholipid antibody syndrome (H), Antiplatelet or antithrombotic long-term use, Aortic root dilatation (H), Aortic stenosis, Arthritis, Balance problem, Coronary artery disease, Gastro-oesophageal reflux disease, Heart attack (H) (2007), Hiatal hernia, Hypercholesterolemia, Hypertension, Ischemic cardiomyopathy, Left foot drop, Liver disease, 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  PAST SURGICAL HISTORY:   has a past surgical history that includes appendectomy; Endoscopic stripping vein(s); Cardiac surgery; IR IVC Filter Placement; colonoscopy; Eye surgery; Cholecystectomy; esophagogastroduodenoscopy; orthopedic surgery; orthopedic surgery; orthopedic surgery; Arthroplasty knee (Right, 9/19/2014); hernia repair; Abdomen surgery; Cystoscopy, dilate urethra, combined (N/A, 10/12/2016); Amputate leg below knee (Left, 04/2014); Incision and drainage lower extremity, combined (Left, 12/1/2017); Amputate leg below knee (Left, 12/4/2017); Irrigation and debridement lower extremity, combined (Left, 12/6/2017); Apply Wound Vac (Left, 12/6/2017); Irrigation and debridement lower extremity, combined (Left, 12/8/2017); Craniotomy (Right, 4/7/2018); Laser holmium lithotripsy ureter(s), insert stent, combined (Bilateral, 6/28/2018); Combined cystoscopy, retrogrades, exchange stent ureter(s) (Left, 7/24/2018); Cystoscopy, remove stent(s), combined (Right, 7/24/2018); Laser holmium lithotripsy ureter(s), insert stent, combined (Left, 8/14/2018); Arthroplasty hip (Right, 11/27/2018); and Esophagoscopy,  gastroscopy, duodenoscopy (EGD), combined (N/A, 3/11/2019).  FAMILY HISTORY: family history includes Heart Failure in his father; Lung Cancer in his mother.  SOCIAL HISTORY:   reports that he has never smoked. He has never used smokeless tobacco. He reports that he drinks alcohol. He reports that he does not use drugs.    Post Discharge Medication Reconciliation Status: discharge medications reconciled, continue medications without change    Current Outpatient Medications   Medication Sig Dispense Refill     carvedilol (COREG) 3.125 MG tablet TAKE 1 TABLET BY MOUTH TWICE DAILY WITH MEALS 180 tablet 3     gabapentin (NEURONTIN) 300 MG capsule Take 1 capsule (300 mg) by mouth 2 times daily       multivitamin, therapeutic (THERA-VIT) TABS tablet Take 1 tablet by mouth daily       rivaroxaban ANTICOAGULANT (XARELTO) 15 MG TABS tablet Take 1 tablet (15 mg) by mouth daily (with dinner)       senna-docusate (SENOKOT-S/PERICOLACE) 8.6-50 MG tablet Take 1 tablet by mouth daily as needed Hold for loose stools. 1180 tablet 3     vitamin B1 (THIAMINE) 100 MG tablet Take 1 tablet (100 mg) by mouth daily         ROS:  10 point ROS of systems including Constitutional, Eyes, Respiratory, Cardiovascular, Gastroenterology, Genitourinary, Integumentary, Musculoskeletal, Psychiatric were all negative except for pertinent positives noted in my HPI.    Vitals:  There were no vitals taken for this visit.  Exam:  GENERAL APPEARANCE:  Alert, in no distress  ENT:  Mouth and posterior oropharynx normal, moist mucous membranes, [place]  EYES:  EOM, conjunctivae, lids, pupils and irises normal, PERRL  RESP:  respiratory effort and palpation of chest normal, lungs clear to auscultation , no respiratory distress  CV:  Palpation and auscultation of heart done , regular rate and rhythm, no murmur, rub, or gallop, peripheral edema 1+ in RLE, trace to LLE BKA  ABDOMEN:  normal bowel sounds, soft, nontender, no hepatosplenomegaly or other masses  M/S:    Examination of:   right upper extremity, left upper extremity and right lower extremity  Inspection, ROM, stability and muscle strength normal and left BKA, strength generalized weakness noted  SKIN:  Inspection of skin and subcutaneous tissue baseline, buttocks/coccyx reddened and inflammed  NEURO:   Cranial nerves 2-12 are normal tested and grossly at patient's baseline, speech wNl  PSYCH:  affect and mood normal    Lab/Diagnostic data:  Recent labs in [facility] reviewed by me today.     ASSESSMENT/PLAN:  Coronary artery disease involving native coronary artery of native heart without angina pectoris h/o CABG  Ischemic cardiomyopathy  Chronic systolic congestive heart failure (H)  Paroxysmal atrial fibrillation (H)  Essential hypertension  Acute/ongoing: PT and OT for strengthening, patient continues to want full code, palliative care consulted and followed during hospitalization  Continue coreg 3.125mg BID and xarelto 15mg QD  start torsemide 20mg QAM and aldactone 12.5mg at noon PTA torsemide 20mg BID and aldactone 12.5mg QD)    CKD (chronic kidney disease) stage 3, GFR 30-59 ml/min (H)  Ongoing: baseline creat 1.7-1.9  BMP twice weekly    Alcoholic cirrhosis of liver with ascites (H)  Ongoing: patient continues to drink alcohol, not interested in quitting, last paracentesis 6/14/19  Will restart torsemide and aldactone today torsemide 20mg QAM and aldactone 12.5mg at noon  Continue thiamine 100mg QD and add folic acid 1mg QD    Encephalopathy  Cognitive impairment  Ongoing: patient back to baseline cognition per hospital report, PT and OT for evaluation    Dysphagia, unspecified type  Ongoing: ST during hospitalization cleared for regular diet with thin liquids, may have ST eval here if concerns       Orders written by provider at facility  BMP twice weekly mon and thurs  CBC weekly on monday  Start torsemide 20mg QAM and aldactone 12.5mg at noon, start tomorrow  Nystatin powder to bottom BID until  clear  WBAT      Total time spent with patient visit at the skilled nursing facility was 45 min including patient visit and review of past records. Greater than 50% of total time spent with counseling and coordinating care due to update orders  Electronically signed by:  Tonya Lynn Haase, APRN CNP                         Sincerely,        Tonya Lynn Haase, APRN CNP

## 2019-06-20 NOTE — LETTER
To:  [facility]    Attn:   Delmis Hameed Sara and Falguni ODENWs        Patient s name:     Antonio Ramirez                                                                                Patient s :      1945                              Admission date:   2019       Dear Social Workers,      Please contact me when the patient is going to be discharged or move to long term care. If the patient is being discharged with Home Care services, please provide the name of the agency.    Please include the discharge date, disposition and main diagnosis, if you will.    If a care conference is going to be held, please let me know as well.    The information needed can be given by e-mail or a phone call.    Thank you!        Diane Chaidez RN Clinic Care Coordinator  [facility] [address]  Phone: [phone]         e-mail: [email or web address]

## 2019-06-21 ENCOUNTER — TRANSFERRED RECORDS (OUTPATIENT)
Dept: HEALTH INFORMATION MANAGEMENT | Facility: CLINIC | Age: 74
End: 2019-06-21

## 2019-06-21 LAB
ANION GAP SERPL CALCULATED.3IONS-SCNC: 9 MMOL/L (ref 7–16)
BUN SERPL-MCNC: 27 MG/DL (ref 7–26)
CALCIUM SERPL-MCNC: 8.9 MG/DL (ref 8.4–10.4)
CHLORIDE SERPLBLD-SCNC: 108 MMOL/L (ref 98–109)
CO2 SERPL-SCNC: 20 MMOL/L (ref 20–29)
CREAT SERPL-MCNC: 1.73 MG/DL (ref 0.73–1.18)
GFR SERPL CREATININE-BSD FRML MDRD: 38 ML/MIN/1.73M2
GLUCOSE SERPL-MCNC: 90 MG/DL (ref 70–100)
POTASSIUM SERPL-SCNC: 4.5 MMOL/L (ref 3.5–5.1)
SODIUM SERPL-SCNC: 137 MMOL/L (ref 136–145)

## 2019-06-22 ENCOUNTER — NURSING HOME VISIT (OUTPATIENT)
Dept: GERIATRICS | Facility: CLINIC | Age: 74
End: 2019-06-22
Payer: MEDICARE

## 2019-06-22 DIAGNOSIS — K70.31 ALCOHOLIC CIRRHOSIS OF LIVER WITH ASCITES (H): Primary | ICD-10-CM

## 2019-06-22 DIAGNOSIS — I48.0 PAROXYSMAL ATRIAL FIBRILLATION (H): ICD-10-CM

## 2019-06-22 DIAGNOSIS — I50.22 CHRONIC SYSTOLIC CONGESTIVE HEART FAILURE (H): ICD-10-CM

## 2019-06-22 DIAGNOSIS — N18.30 CKD (CHRONIC KIDNEY DISEASE) STAGE 3, GFR 30-59 ML/MIN (H): ICD-10-CM

## 2019-06-22 DIAGNOSIS — I25.10 CORONARY ARTERY DISEASE INVOLVING NATIVE CORONARY ARTERY OF NATIVE HEART WITHOUT ANGINA PECTORIS: ICD-10-CM

## 2019-06-22 PROCEDURE — 99309 SBSQ NF CARE MODERATE MDM 30: CPT | Performed by: INTERNAL MEDICINE

## 2019-06-23 ENCOUNTER — TELEPHONE (OUTPATIENT)
Dept: GERIATRICS | Facility: CLINIC | Age: 74
End: 2019-06-23

## 2019-06-24 VITALS
DIASTOLIC BLOOD PRESSURE: 62 MMHG | SYSTOLIC BLOOD PRESSURE: 113 MMHG | TEMPERATURE: 96 F | OXYGEN SATURATION: 99 % | RESPIRATION RATE: 18 BRPM | BODY MASS INDEX: 34.54 KG/M2 | HEART RATE: 53 BPM | WEIGHT: 269 LBS

## 2019-06-24 RX ORDER — SPIRONOLACTONE 25 MG/1
12.5 TABLET ORAL DAILY
COMMUNITY
End: 2019-06-27

## 2019-06-24 RX ORDER — TORSEMIDE 20 MG/1
20 TABLET ORAL 2 TIMES DAILY
COMMUNITY
End: 2019-12-16

## 2019-06-24 RX ORDER — FOLIC ACID 1 MG/1
1 TABLET ORAL DAILY
COMMUNITY
End: 2019-08-12

## 2019-06-24 RX ORDER — NYSTATIN 100000 [USP'U]/G
POWDER TOPICAL 2 TIMES DAILY
COMMUNITY
End: 2019-07-26

## 2019-06-24 NOTE — PROGRESS NOTES
[facility]  [place] Medical Record Number:  [id number]  Place of Service where encounter took place:  [facility] (S) [[id number]]  Chief Complaint   Patient presents with     RECHECK       HPI:    Antonio Ramirez  is a 74 year old (1945), who is being seen today for an episodic care visit.  HPI information obtained from: facility chart records, facility staff, patient report and [facility] chart review. Today's concern is:  Alcoholic cirrhosis: ongoing, abdominal ascites, has had multiple paracentesis last  6/14/19, patient denies SOB, abdominal discomfort  CHF/CAD: patient weight trended up over the weekend admit weight was 253lbs and yesterday weight 269lbs, today patient weight 257.8lbs he was given extra diuretic, on exam today denies SOB, cough, congestion, CP, palpitations, BP as follows: 113/62, 118/65, 128/69 with HR 60's  Encephalopathy: cognitive assessment, BIMS 12/15, SBT 9/28, needing EZ lift for transfers, unable to walk, max assist with all ADL's  Dysphagia: ST working with patient no concerns with swallowing, just needs cues when eating    Past Medical and Surgical History reviewed in Epic today.    MEDICATIONS:  Current Outpatient Medications   Medication Sig Dispense Refill     carvedilol (COREG) 3.125 MG tablet TAKE 1 TABLET BY MOUTH TWICE DAILY WITH MEALS 180 tablet 3     folic acid (FOLVITE) 1 MG tablet Take 1 mg by mouth daily       gabapentin (NEURONTIN) 300 MG capsule Take 1 capsule (300 mg) by mouth 2 times daily       multivitamin, therapeutic (THERA-VIT) TABS tablet Take 1 tablet by mouth daily       nystatin (MYCOSTATIN) 871568 UNIT/GM external powder Apply topically 2 times daily       rivaroxaban ANTICOAGULANT (XARELTO) 15 MG TABS tablet Take 1 tablet (15 mg) by mouth daily (with dinner)       senna-docusate (SENOKOT-S/PERICOLACE) 8.6-50 MG tablet Take 1 tablet by mouth daily as needed Hold for loose stools. 1180 tablet 3     spironolactone  (ALDACTONE) 25 MG tablet Take 12.5 mg by mouth daily       torsemide (DEMADEX) 20 MG tablet Take 20 mg by mouth daily       vitamin B1 (THIAMINE) 100 MG tablet Take 1 tablet (100 mg) by mouth daily           REVIEW OF SYSTEMS:  10 point ROS of systems including Constitutional, Eyes, Respiratory, Cardiovascular, Gastroenterology, Genitourinary, Integumentary, Musculoskeletal, Psychiatric were all negative except for pertinent positives noted in my HPI.    Objective:  /62   Pulse 53   Temp 96  F (35.6  C)   Resp 18   Wt 122 kg (269 lb)   SpO2 99%   BMI 34.54 kg/m    Exam:  GENERAL APPEARANCE:  Alert, in no distress  ENT:  Mouth and posterior oropharynx normal, moist mucous membranes, [facility]  EYES:  EOM, conjunctivae, lids, pupils and irises normal, PERRL  RESP:  respiratory effort and palpation of chest normal, lungs clear to auscultation , no respiratory distress  CV:  Palpation and auscultation of heart done , regular rate and rhythm, no murmur, rub, or gallop, peripheral edema 1+ in RLE, trace to LLE BKA  ABDOMEN:  normal bowel sounds, soft, nontender, no hepatosplenomegaly or other masses  M/S:   Examination of:   right upper extremity, left upper extremity and right lower extremity  Inspection, ROM, stability and muscle strength normal and left BKA, strength generalized weakness noted  SKIN:  Inspection of skin and subcutaneous tissue baseline, buttocks/coccyx reddened and inflammed  NEURO:   Cranial nerves 2-12 are normal tested and grossly at patient's baseline, speech wNl  PSYCH:  affect and mood normal    Labs:   Recent labs in [facility] reviewed by me today.     ASSESSMENT/PLAN:  Coronary artery disease involving native coronary artery of native heart without angina pectoris h/o CABG  Ischemic cardiomyopathy  Chronic systolic congestive heart failure (H)  Paroxysmal atrial fibrillation (H)  Essential hypertension  Acute/ongoing: PT and OT for strengthening, patient continues to want full code, palliative  care consulted and followed during hospitalization  Continue coreg 3.125mg BID and xarelto 15mg QD  increase torsemide to  20mg BID and aldactone 25mg     CKD (chronic kidney disease) stage 3, GFR 30-59 ml/min (H)  Ongoing: baseline creat 1.7-1.9  BMP in AM     Alcoholic cirrhosis of liver with ascites (H)  Ongoing: patient continues to drink alcohol, not interested in quitting, last paracentesis 6/14/19  Increase torsemide to 20mg BID and aldactone to 25mg QD  Continue thiamine 100mg QD and add folic acid 1mg QD     Encephalopathy  Cognitive impairment  Ongoing: patient back to baseline cognition per hospital report, PT and OT for evaluation     Dysphagia, unspecified type  Ongoing: ST following       Orders written by provider at facility  Increase torsemide to 20mg BID  Increase aldactone to 25mg QD  BMP in AM      Electronically signed by:  Tonya Lynn Haase, APRN CNP

## 2019-06-24 NOTE — PROGRESS NOTES
"[facility]  PRIMARY CARE PROVIDER AND CLINIC:  Main Hardy MD, [address] / [place] [address]      Pt was seen by Dr Thao for a f/u TCU visit.      Antonio Ramirez  is a 74 year old  (1945), PMH of HTN, HLD, CAD s/p CABG, hx of alcoholic liver cirrhosis with portal HTN and ascites s/p multiple paracenteses, systolic CHF, valvular disease, paroxysmal Afib on chronic anticoagulation with Xarelto, antiphospholipid antibody syndrome with history of DVT and PE s/p IVC filter, hx MSSA bacteremia with hx Lt BKA, traumatic subdural hematoma, who was hospitalized at [facility] from 6/11/19-6/19/19 for the management of weakness, cough, emesis, with possible pneumonia.    Pt subsequently suffered a PEA cardiac arrest, and shortly afterwords, a VT arrest, both of which necessitated CPR. Primary process felt to be secondary to sepsis from CAP vs aspiration pneumonia    Encephalopathy secondary to anoxia r/t cardiac arrest gradually resolved.  History of DVT/PE, on chronic  Xarelto  History of CHF and aortic stenosis-PTA torsemide and aldactone held secondary to relatively low BP. Coreg was resumed.    Pt states he feels fairly well, though very weak  He notes L sided chest pain, \"from what your buddies did to me\"  He denies SOB, abd pain.  Appetite is fair      CODE STATUS/ADVANCE DIRECTIVES DISCUSSION:   CPR/Full code   Patient's living condition: lives with spouse  ALLERGIES: Ciprofloxacin and Hmg-coa-r inhibitors  PAST MEDICAL HISTORY:  has a past medical history of Alcoholism (H), Amputated left leg (H), Anemia, Anticardiolipin antibody syndrome (H), Antiphospholipid antibody syndrome (H), Antiplatelet or antithrombotic long-term use, Aortic root dilatation (H), Aortic stenosis, Arthritis, Balance problem, Coronary artery disease, Gastro-oesophageal reflux disease, Heart attack (H) (2007), Hiatal hernia, Hypercholesterolemia, Hypertension, Ischemic cardiomyopathy, Left foot drop, Liver " disease, 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  PAST SURGICAL HISTORY:   has a past surgical history that includes appendectomy; Endoscopic stripping vein(s); Cardiac surgery; IR IVC Filter Placement; colonoscopy; Eye surgery; Cholecystectomy; esophagogastroduodenoscopy; orthopedic surgery; orthopedic surgery; orthopedic surgery; Arthroplasty knee (Right, 9/19/2014); hernia repair; Abdomen surgery; Cystoscopy, dilate urethra, combined (N/A, 10/12/2016); Amputate leg below knee (Left, 04/2014); Incision and drainage lower extremity, combined (Left, 12/1/2017); Amputate leg below knee (Left, 12/4/2017); Irrigation and debridement lower extremity, combined (Left, 12/6/2017); Apply Wound Vac (Left, 12/6/2017); Irrigation and debridement lower extremity, combined (Left, 12/8/2017); Craniotomy (Right, 4/7/2018); Laser holmium lithotripsy ureter(s), insert stent, combined (Bilateral, 6/28/2018); Combined cystoscopy, retrogrades, exchange stent ureter(s) (Left, 7/24/2018); Cystoscopy, remove stent(s), combined (Right, 7/24/2018); Laser holmium lithotripsy ureter(s), insert stent, combined (Left, 8/14/2018); Arthroplasty hip (Right, 11/27/2018); and Esophagoscopy, gastroscopy, duodenoscopy (EGD), combined (N/A, 3/11/2019).  FAMILY HISTORY: family history includes Heart Failure in his father; Lung Cancer in his mother.  SOCIAL HISTORY:   reports that he has never smoked. He has never used smokeless tobacco. He reports that he drinks alcohol. He reports that he does not use drugs.      Current Outpatient Medications   Medication Sig Dispense Refill     carvedilol (COREG)  3.125 MG tablet TAKE 1 TABLET BY MOUTH TWICE DAILY WITH MEALS 180 tablet 3     gabapentin (NEURONTIN) 300 MG capsule Take 1 capsule (300 mg) by mouth 2 times daily       multivitamin, therapeutic (THERA-VIT) TABS tablet Take 1 tablet by mouth daily       rivaroxaban ANTICOAGULANT (XARELTO) 15 MG TABS tablet Take 1 tablet (15 mg) by mouth daily (with dinner)       senna-docusate (SENOKOT-S/PERICOLACE) 8.6-50 MG tablet Take 1 tablet by mouth daily as needed Hold for loose stools. 1180 tablet 3     vitamin B1 (THIAMINE) 100 MG tablet Take 1 tablet (100 mg) by mouth daily         ROS:  10 point ROS neg except as noted above.      Exam:  GENERAL APPEARANCE:  Alert, in no distress, lying in bed  ENT:  Oral mucosa moist  EYES:  No eye redness or drainage  RESP:  Lungs clear  CV:  RRR  ABDOMEN:  Soft, protuberant  M/S:  S/p L [place]  2 + edema R LE   + scrotal and penile edema  SKIN: no rash  NEURO: CN intact  Face symmetric  No tremor  PSYCH:  affect and mood normal, fully oriented, insight diminished.          ASSESSMENT/PLAN:    Coronary artery disease involving native coronary artery of native heart without angina pectoris h/o CABG  Ischemic cardiomyopathy  Chronic systolic congestive heart failure (H)  Paroxysmal atrial fibrillation (H)  Essential hypertension  CV status stable. Continued generalized edema secondary to CHF, liver disease, recent sepsis  Plan monitor wt, exam, BMP. Continue diuretics, Xarelto    Cardiac arrest  Felt most likely to be secondary to sepsis, pneumonia  Plan continue current medications, monitor vitals.      CKD (chronic kidney disease) stage 3, GFR 30-59 ml/min (H)  Ongoing: baseline creat 1.7-1.9  Plan monitor BMP    Alcoholic cirrhosis of liver with ascites (H)  Ongoing ETOH use prior to admission  Plan continue diuretics, monitor BMP, exam. Monitor for need for paracentesis    Encephalopathy  Cognitive impairment  Ongoing: patient back to baseline cognition per hospital report, PT and OT  for evaluation      Jeffrey Thao MD

## 2019-06-24 NOTE — TELEPHONE ENCOUNTER
Nursing called to report weight gain from the 21st  = 244.8 and today = 269 lbs.  Swollen all over.    On Spirolactone and Torsemide    Orders:  Gave order to give the 12.5mg of Spirolactone again today and another Torsemide 20mg today.    Call back if any further issues    Electronically signed by Viviane Lennon RN, CNP

## 2019-06-25 ENCOUNTER — NURSING HOME VISIT (OUTPATIENT)
Dept: GERIATRICS | Facility: CLINIC | Age: 74
End: 2019-06-25
Payer: MEDICARE

## 2019-06-25 DIAGNOSIS — G93.40 ENCEPHALOPATHY: ICD-10-CM

## 2019-06-25 DIAGNOSIS — I48.0 PAROXYSMAL ATRIAL FIBRILLATION (H): ICD-10-CM

## 2019-06-25 DIAGNOSIS — R13.10 DYSPHAGIA, UNSPECIFIED TYPE: ICD-10-CM

## 2019-06-25 DIAGNOSIS — K70.31 ALCOHOLIC CIRRHOSIS OF LIVER WITH ASCITES (H): Primary | ICD-10-CM

## 2019-06-25 DIAGNOSIS — R41.89 COGNITIVE IMPAIRMENT: ICD-10-CM

## 2019-06-25 DIAGNOSIS — I10 ESSENTIAL HYPERTENSION: ICD-10-CM

## 2019-06-25 DIAGNOSIS — I25.5 ISCHEMIC CARDIOMYOPATHY: ICD-10-CM

## 2019-06-25 DIAGNOSIS — I25.10 CORONARY ARTERY DISEASE INVOLVING NATIVE CORONARY ARTERY OF NATIVE HEART WITHOUT ANGINA PECTORIS: ICD-10-CM

## 2019-06-25 DIAGNOSIS — I50.22 CHRONIC SYSTOLIC CONGESTIVE HEART FAILURE (H): ICD-10-CM

## 2019-06-25 DIAGNOSIS — N18.30 CKD (CHRONIC KIDNEY DISEASE) STAGE 3, GFR 30-59 ML/MIN (H): ICD-10-CM

## 2019-06-25 PROCEDURE — 99309 SBSQ NF CARE MODERATE MDM 30: CPT | Performed by: NURSE PRACTITIONER

## 2019-06-25 NOTE — LETTER
6/25/2019        RE: Antonio Ramirez  [address]  [place] [address]        [facility]  [place] Medical Record Number:  [id number]  Place of Service where encounter took place:  [facility] (S) [[id number]]  Chief Complaint   Patient presents with     RECHECK       HPI:    Antonio Ramirez  is a 74 year old (1945), who is being seen today for an episodic care visit.  HPI information obtained from: facility chart records, facility staff, patient report and [facility] chart review. Today's concern is:  Alcoholic cirrhosis: ongoing, abdominal ascites, has had multiple paracentesis last  6/14/19, patient denies SOB, abdominal discomfort  CHF/CAD: patient weight trended up over the weekend admit weight was 253lbs and yesterday weight 269lbs, today patient weight 257.8lbs he was given extra diuretic, on exam today denies SOB, cough, congestion, CP, palpitations, BP as follows: 113/62, 118/65, 128/69 with HR 60's  Encephalopathy: cognitive assessment, BIMS 12/15, SBT 9/28, needing EZ lift for transfers, unable to walk, max assist with all ADL's  Dysphagia: ST working with patient no concerns with swallowing, just needs cues when eating    Past Medical and Surgical History reviewed in Epic today.    MEDICATIONS:  Current Outpatient Medications   Medication Sig Dispense Refill     carvedilol (COREG) 3.125 MG tablet TAKE 1 TABLET BY MOUTH TWICE DAILY WITH MEALS 180 tablet 3     folic acid (FOLVITE) 1 MG tablet Take 1 mg by mouth daily       gabapentin (NEURONTIN) 300 MG capsule Take 1 capsule (300 mg) by mouth 2 times daily       multivitamin, therapeutic (THERA-VIT) TABS tablet Take 1 tablet by mouth daily       nystatin (MYCOSTATIN) 235609 UNIT/GM external powder Apply topically 2 times daily       rivaroxaban ANTICOAGULANT (XARELTO) 15 MG TABS tablet Take 1 tablet (15 mg) by mouth daily (with dinner)       senna-docusate (SENOKOT-S/PERICOLACE) 8.6-50 MG tablet Take 1  tablet by mouth daily as needed Hold for loose stools. 1180 tablet 3     spironolactone (ALDACTONE) 25 MG tablet Take 12.5 mg by mouth daily       torsemide (DEMADEX) 20 MG tablet Take 20 mg by mouth daily       vitamin B1 (THIAMINE) 100 MG tablet Take 1 tablet (100 mg) by mouth daily           REVIEW OF SYSTEMS:  10 point ROS of systems including Constitutional, Eyes, Respiratory, Cardiovascular, Gastroenterology, Genitourinary, Integumentary, Musculoskeletal, Psychiatric were all negative except for pertinent positives noted in my HPI.    Objective:  /62   Pulse 53   Temp 96  F (35.6  C)   Resp 18   Wt 122 kg (269 lb)   SpO2 99%   BMI 34.54 kg/m     Exam:  GENERAL APPEARANCE:  Alert, in no distress  ENT:  Mouth and posterior oropharynx normal, moist mucous membranes, [place]  EYES:  EOM, conjunctivae, lids, pupils and irises normal, PERRL  RESP:  respiratory effort and palpation of chest normal, lungs clear to auscultation , no respiratory distress  CV:  Palpation and auscultation of heart done , regular rate and rhythm, no murmur, rub, or gallop, peripheral edema 1+ in RLE, trace to LLE BKA  ABDOMEN:  normal bowel sounds, soft, nontender, no hepatosplenomegaly or other masses  M/S:   Examination of:   right upper extremity, left upper extremity and right lower extremity  Inspection, ROM, stability and muscle strength normal and left BKA, strength generalized weakness noted  SKIN:  Inspection of skin and subcutaneous tissue baseline, buttocks/coccyx reddened and inflammed  NEURO:   Cranial nerves 2-12 are normal tested and grossly at patient's baseline, speech wNl  PSYCH:  affect and mood normal    Labs:   Recent labs in EPIC reviewed by me today.     ASSESSMENT/PLAN:  Coronary artery disease involving native coronary artery of native heart without angina pectoris h/o CABG  Ischemic cardiomyopathy  Chronic systolic congestive heart failure (H)  Paroxysmal atrial fibrillation (H)  Essential  hypertension  Acute/ongoing: PT and OT for strengthening, patient continues to want full code, palliative care consulted and followed during hospitalization  Continue coreg 3.125mg BID and xarelto 15mg QD  increase torsemide  to  20mg BID and aldactone 25mg     CKD (chronic kidney disease) stage 3, GFR 30-59 ml/min (H)  Ongoing: baseline creat 1.7-1.9  BMP in AM     Alcoholic cirrhosis of liver with ascites (H)  Ongoing: patient continues to drink alcohol, not interested in quitting, last paracentesis 6/14/19  Increase torsemide to 20mg BID and aldactone to 25mg QD  Continue thiamine 100mg QD and add folic acid 1mg QD     Encephalopathy  Cognitive impairment  Ongoing: patient back to baseline cognition per hospital report, PT and OT for evaluation     Dysphagia, unspecified type  Ongoing: ST following       Orders written by provider at facility  Increase torsemide to 20mg BID  Increase aldactone to 25mg QD  BMP in AM      Electronically signed by:  Tonya Lynn Haase, APRN CNP               Sincerely,        Tonya Lynn Haase, APRN CNP

## 2019-06-26 ENCOUNTER — TRANSFERRED RECORDS (OUTPATIENT)
Dept: HEALTH INFORMATION MANAGEMENT | Facility: CLINIC | Age: 74
End: 2019-06-26

## 2019-06-26 VITALS
WEIGHT: 260.4 LBS | HEART RATE: 57 BPM | DIASTOLIC BLOOD PRESSURE: 59 MMHG | TEMPERATURE: 98.2 F | OXYGEN SATURATION: 95 % | SYSTOLIC BLOOD PRESSURE: 102 MMHG | BODY MASS INDEX: 33.43 KG/M2 | RESPIRATION RATE: 16 BRPM

## 2019-06-26 LAB
ANION GAP SERPL CALCULATED.3IONS-SCNC: 6 MMOL/L (ref 7–16)
BUN SERPL-MCNC: 50 MG/DL (ref 7–26)
CALCIUM SERPL-MCNC: 8.6 MG/DL (ref 8.4–10.4)
CHLORIDE SERPLBLD-SCNC: 106 MMOL/L (ref 98–109)
CO2 SERPL-SCNC: 23 MMOL/L (ref 20–29)
CREAT SERPL-MCNC: 2.43 MG/DL (ref 0.73–1.18)
GFR SERPL CREATININE-BSD FRML MDRD: 25 ML/MIN/1.73M2
GLUCOSE SERPL-MCNC: 84 MG/DL (ref 70–100)
POTASSIUM SERPL-SCNC: 5 MMOL/L (ref 3.5–5.1)
SODIUM SERPL-SCNC: 135 MMOL/L (ref 136–145)

## 2019-06-27 ENCOUNTER — NURSING HOME VISIT (OUTPATIENT)
Dept: GERIATRICS | Facility: CLINIC | Age: 74
End: 2019-06-27
Payer: MEDICARE

## 2019-06-27 ENCOUNTER — TELEPHONE (OUTPATIENT)
Dept: MEDSURG UNIT | Facility: CLINIC | Age: 74
End: 2019-06-27

## 2019-06-27 DIAGNOSIS — K70.31 ALCOHOLIC CIRRHOSIS OF LIVER WITH ASCITES (H): Primary | ICD-10-CM

## 2019-06-27 DIAGNOSIS — I25.10 CORONARY ARTERY DISEASE INVOLVING NATIVE CORONARY ARTERY OF NATIVE HEART WITHOUT ANGINA PECTORIS: ICD-10-CM

## 2019-06-27 DIAGNOSIS — I25.5 ISCHEMIC CARDIOMYOPATHY: ICD-10-CM

## 2019-06-27 DIAGNOSIS — I10 ESSENTIAL HYPERTENSION: ICD-10-CM

## 2019-06-27 DIAGNOSIS — I50.22 CHRONIC SYSTOLIC CONGESTIVE HEART FAILURE (H): ICD-10-CM

## 2019-06-27 DIAGNOSIS — I48.0 PAROXYSMAL ATRIAL FIBRILLATION (H): ICD-10-CM

## 2019-06-27 DIAGNOSIS — N18.30 CKD (CHRONIC KIDNEY DISEASE) STAGE 3, GFR 30-59 ML/MIN (H): ICD-10-CM

## 2019-06-27 DIAGNOSIS — G93.40 ENCEPHALOPATHY: ICD-10-CM

## 2019-06-27 PROCEDURE — 99309 SBSQ NF CARE MODERATE MDM 30: CPT | Performed by: NURSE PRACTITIONER

## 2019-06-27 NOTE — PROGRESS NOTES
[facility]  [place] Medical Record Number:  [id number]  Place of Service where encounter took place:  [facility] ([facility]) [[id number]]  Chief Complaint   Patient presents with     Nursing Home Acute       HPI:    Antonio Ramirez  is a 74 year old (1945), who is being seen today for an episodic care visit.  HPI information obtained from: facility chart records, facility staff, patient report and [facility] chart review.     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  Had paracentesis 1 day prior to admission and developed chills and possible pneumonia treated with vanco and zosyn. Continues to drink alcohol and has known esophageal varices. Had encephalopathy and cardiac arrest x 2 (6/11/19), continues to be confused at times. Due to edema increased Torsemide to 20 mg BID and aldactone to 25 mg daily. Worsened renal function - stopped aldactone.     Today's concerns are:  Seen for episodic f/u due to weight gain 7 lbs since admission, increased edema/abdominal distention. Notes breathing is at baseline, does have diminished LS RLL. Sats 95% on room air, -128 and no fevers. Ascites not responding to oral diuresis. Has standing orders for US paracentesis with albumin infusions if 6 liters or more are removed.     Past Medical and Surgical History reviewed in Epic today.    MEDICATIONS:  Current Outpatient Medications   Medication Sig Dispense Refill     folic acid (FOLVITE) 1 MG tablet Take 1 mg by mouth daily       gabapentin  (NEURONTIN) 300 MG capsule Take 1 capsule (300 mg) by mouth 2 times daily       multivitamin, therapeutic (THERA-VIT) TABS tablet Take 1 tablet by mouth daily       nystatin (MYCOSTATIN) 314106 UNIT/GM external powder Apply topically 2 times daily       rivaroxaban ANTICOAGULANT (XARELTO) 15 MG TABS tablet Take 1 tablet (15 mg) by mouth daily (with dinner)       senna-docusate (SENOKOT-S/PERICOLACE) 8.6-50 MG tablet Take 1 tablet by mouth daily as needed Hold for loose stools. 1180 tablet 3     torsemide (DEMADEX) 20 MG tablet Take 20 mg by mouth 2 times daily        vitamin B1 (THIAMINE) 100 MG tablet Take 1 tablet (100 mg) by mouth daily         REVIEW OF SYSTEMS:  10 point ROS of systems including Constitutional, Eyes, Respiratory, Cardiovascular, Gastroenterology, Genitourinary, Integumentary, Musculoskeletal, Psychiatric were all negative except for pertinent positives noted in my HPI.    Objective:  /59   Pulse 57   Temp 98.2  F (36.8  C)   Resp 16   Wt 118.1 kg (260 lb 6.4 oz)   SpO2 95%   BMI 33.43 kg/m    Exam:  GENERAL APPEARANCE:  Alert, in no distress, pleasant, cooperative, oriented x self, place. Month/year as well  EYES:  EOM, lids, pupils and irises normal, sclera clear and conjunctiva normal, no discharge or mattering on lids or lashes noted  ENT:  Mouth normal, moist mucous membranes, nose normal without drainage or crusting, external ears without lesions, hearing acuity intact  RESP:  respiratory effort normal, no chest wall tenderness, no respiratory distress, Lung sounds diminished RLL, patient is on room air  CV:  Auscultation of heart done, rate and rhythm controlled and regular, soft systolic murmur, no rub or gallop. Edema +3 pitting bilateral lower extremities.   ABDOMEN:  Distended and firm abdomen, hypoactive bowel sounds.   M/S:   Gait and station bedbound, no tenderness or swelling of the joints; able to move all extremities, generalized weakness  NEURO: cranial nerves 2-12  grossly intact, no facial asymmetry, no speech deficits and able to follow directions  PSYCH:  insight and judgement and memory impaired, affect and mood normal     Labs:     Most Recent 3 CBC's:  Recent Labs   Lab Test 06/19/19 0727 06/18/19  0658 06/15/19  0651   WBC 6.2 6.2 6.6   HGB 10.7* 10.2* 10.0*   MCV 95 95 95   * 125* 103*     Most Recent 3 BMP's:  Recent Labs   Lab Test 06/21/19 06/19/19  0727 06/18/19  0658    140 140   POTASSIUM 4.5 3.9 3.9   CHLORIDE 108 111* 110*   CO2 20 26 24   BUN 27* 28 26   CR 1.73* 1.77* 1.82*   ANIONGAP 9 3 6   BENNIE 8.9 8.6 8.5   GLC 90 93 91     Most Recent 2 LFT's:  Recent Labs   Lab Test 06/19/19 0727 06/15/19  0651   AST 20 24   ALT 9 10   ALKPHOS 58 60   BILITOTAL 0.6 1.2       ASSESSMENT/PLAN:  Alcoholic cirrhosis of liver with ascites (H)  Worsened weight gain, ascites. Scheduling paracentesis for 7/1 and need to hold Xarelto x 2 days prior. Albumin if needed per protocol.     Coronary artery disease involving native coronary artery of native heart without angina pectoris h/o CABG  Ischemic cardiomyopathy  Chronic systolic congestive heart failure (H)  Paroxysmal atrial fibrillation (H)  Essential hypertension  CKD (chronic kidney disease) stage 3, GFR 30-59 ml/min (H)  Acute on chronic issues, meds as above. Wt daily - monitor for any increase in dyspnea. F/U with BPs, wt on 6/28. Stop coreg. Hold Xarelto as above.     Encephalopathy  Ongoing - monitor for safety.     Orders written by provider at facility  1. discontinue coreg  2. Schedule patient for [facility] paracentesis at [facility] on 7/1, Monday with albumin per protocol if needed.   3. Hold Xarelto x 2 days prior to paracentesis then resume as previously ordered.     Electronically signed by:  DIPAK Marshall CNP

## 2019-06-27 NOTE — LETTER
6/27/2019        RE: Antonio Ramirez  [address]  [place] [address]        [place] [facility]  [facility] Medical Record Number:  [id number]  Place of Service where encounter took place:  [facility] ([facility]) [[id number]]  Chief Complaint   Patient presents with     Nursing Home Acute       HPI:    Antonio Ramirez  is a 74 year old (1945), who is being seen today for an episodic care visit.  HPI information obtained from: facility chart records, facility staff, patient report and [facility] chart review.     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  Had paracentesis 1 day prior to admission and developed chills and possible pneumonia treated with vanco and zosyn. Continues to drink alcohol and has known esophageal varices. Had encephalopathy and cardiac arrest x 2 (6/11/19), continues to be confused at times. Due to edema increased Torsemide to 20 mg BID and aldactone to 25 mg daily. Worsened renal function - stopped aldactone.     Today's concerns are:  Seen for episodic f/u due to weight gain 7 lbs since admission, increased edema/abdominal distention. Notes breathing is at baseline, does have diminished LS RLL. Sats 95% on room air, -128 and no fevers. Ascites not responding to oral diuresis. Has standing orders for US paracentesis with albumin infusions if 6 liters or more are removed.     Past Medical and Surgical History reviewed in Epic today.    MEDICATIONS:  Current Outpatient Medications   Medication Sig  Dispense Refill     folic acid (FOLVITE) 1 MG tablet Take 1 mg by mouth daily       gabapentin (NEURONTIN) 300 MG capsule Take 1 capsule (300 mg) by mouth 2 times daily       multivitamin, therapeutic (THERA-VIT) TABS tablet Take 1 tablet by mouth daily       nystatin (MYCOSTATIN) 973513 UNIT/GM external powder Apply topically 2 times daily       rivaroxaban ANTICOAGULANT (XARELTO) 15 MG TABS tablet Take 1 tablet (15 mg) by mouth daily (with dinner)       senna-docusate (SENOKOT-S/PERICOLACE) 8.6-50 MG tablet Take 1 tablet by mouth daily as needed Hold for loose stools. 1180 tablet 3     torsemide (DEMADEX) 20 MG tablet Take 20 mg by mouth 2 times daily        vitamin B1 (THIAMINE) 100 MG tablet Take 1 tablet (100 mg) by mouth daily         REVIEW OF SYSTEMS:  10 point ROS of systems including Constitutional, Eyes, Respiratory, Cardiovascular, Gastroenterology, Genitourinary, Integumentary, Musculoskeletal, Psychiatric were all negative except for pertinent positives noted in my HPI.    Objective:  /59   Pulse 57   Temp 98.2  F (36.8  C)   Resp 16   Wt 118.1 kg (260 lb 6.4 oz)   SpO2 95%   BMI 33.43 kg/m     Exam:  GENERAL APPEARANCE:  Alert, in no distress, pleasant, cooperative, oriented x self, place. Month/year as well  EYES:  EOM, lids, pupils and irises normal, sclera clear and conjunctiva normal, no discharge or mattering on lids or lashes noted  ENT:  Mouth normal, moist mucous membranes, nose normal without drainage or crusting, external ears without lesions, hearing acuity intact  RESP:  respiratory effort normal, no chest wall tenderness, no respiratory distress, Lung sounds diminished RLL, patient is on room air  CV:  Auscultation of heart done, rate and rhythm controlled and regular, soft systolic murmur, no rub or gallop. Edema +3 pitting bilateral lower extremities.   ABDOMEN:  Distended and firm abdomen, hypoactive bowel sounds.   M/S:   Gait and station bedbound, no tenderness or  swelling of the joints; able to move all extremities, generalized weakness  NEURO: cranial nerves 2-12 grossly intact, no facial asymmetry, no speech deficits and able to follow directions  PSYCH:  insight and judgement and memory impaired, affect and mood normal     Labs:     Most Recent 3 CBC's:  Recent Labs   Lab Test 06/19/19  0727 06/18/19  0658 06/15/19  0651   WBC 6.2 6.2 6.6   HGB 10.7* 10.2* 10.0*   MCV 95 95 95   * 125* 103*     Most Recent 3 BMP's:  Recent Labs   Lab Test 06/21/19 06/19/19  0727 06/18/19  0658    140 140   POTASSIUM 4.5 3.9 3.9   CHLORIDE 108 111* 110*   CO2 20 26 24   BUN 27* 28 26   CR 1.73* 1.77* 1.82*   ANIONGAP 9 3 6   BENNIE 8.9 8.6 8.5   GLC 90 93 91     Most Recent 2 LFT's:  Recent Labs   Lab Test 06/19/19  0727 06/15/19  0651   AST 20 24   ALT 9 10   ALKPHOS 58 60   BILITOTAL 0.6 1.2       ASSESSMENT/PLAN:  Alcoholic cirrhosis of liver with ascites (H)  Worsened weight gain, ascites. Scheduling paracentesis for 7/1 and need to hold Xarelto x 2 days prior. Albumin if needed per protocol.     Coronary artery disease involving native coronary artery of native heart without angina pectoris h/o CABG  Ischemic cardiomyopathy  Chronic systolic congestive heart failure (H)  Paroxysmal atrial fibrillation (H)  Essential hypertension  CKD (chronic kidney disease) stage 3, GFR 30-59 ml/min (H)  Acute on chronic issues, meds as above. Wt daily - monitor for any increase in dyspnea. F/U with BPs, wt on 6/28. Stop coreg. Hold Xarelto as above.     Encephalopathy  Ongoing - monitor for safety.     Orders written by provider at facility  1. discontinue coreg  2. Schedule patient for US paracentesis at [facility] on 7/1, Monday with albumin per protocol if needed.   3. Hold Xarelto x 2 days prior to paracentesis then resume as previously ordered.     Electronically signed by:  DIPAK Marshall CNP               Sincerely,        DIPAK Marshall CNP

## 2019-06-28 ENCOUNTER — NURSING HOME VISIT (OUTPATIENT)
Dept: GERIATRICS | Facility: CLINIC | Age: 74
End: 2019-06-28
Payer: MEDICARE

## 2019-06-28 VITALS
HEART RATE: 49 BPM | DIASTOLIC BLOOD PRESSURE: 63 MMHG | RESPIRATION RATE: 14 BRPM | BODY MASS INDEX: 34.81 KG/M2 | OXYGEN SATURATION: 99 % | TEMPERATURE: 96.7 F | SYSTOLIC BLOOD PRESSURE: 108 MMHG | WEIGHT: 271.1 LBS

## 2019-06-28 DIAGNOSIS — I10 ESSENTIAL HYPERTENSION: ICD-10-CM

## 2019-06-28 DIAGNOSIS — K70.31 ALCOHOLIC CIRRHOSIS OF LIVER WITH ASCITES (H): Primary | ICD-10-CM

## 2019-06-28 DIAGNOSIS — N18.30 CKD (CHRONIC KIDNEY DISEASE) STAGE 3, GFR 30-59 ML/MIN (H): ICD-10-CM

## 2019-06-28 DIAGNOSIS — I50.22 CHRONIC SYSTOLIC CONGESTIVE HEART FAILURE (H): ICD-10-CM

## 2019-06-28 DIAGNOSIS — I48.0 PAROXYSMAL ATRIAL FIBRILLATION (H): ICD-10-CM

## 2019-06-28 PROCEDURE — 99308 SBSQ NF CARE LOW MDM 20: CPT | Performed by: NURSE PRACTITIONER

## 2019-06-28 NOTE — PROGRESS NOTES
[place] [facility]  [place] [facility] Record Number:  [id number]  Place of Service where encounter took place:  [facility] ([facility]) [[id number]]  Chief Complaint   Patient presents with     Nursing Home Acute       HPI:    Antonio Ramirez  is a 74 year old (1945), who is being seen today for an episodic care visit.  HPI information obtained from: facility chart records, facility staff, patient report and [facility] chart review.     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  Had paracentesis 1 day prior to admission and developed chills and possible pneumonia treated with vanco and zosyn. Continues to drink alcohol and has known esophageal varices. Had encephalopathy and cardiac arrest x 2 (6/11/19), continues to be confused at times. Due to edema increased Torsemide to 20 mg BID and aldactone to 25 mg daily. Worsened renal function - stopped aldactone.     Today's concerns are:  Seen for episodic f/u due to weight gain up to 270 lb since admission, increased edema/abdominal distention. Notes breathing is at baseline, does have diminished LS RLL. Sats 99% on room air, no fevers. Ascites not responding to oral diuresis. Has standing orders for US paracentesis with albumin infusions if 6 liters or more are removed. Wt up one lb since yesterday, edema persists. Cr 2.43, up from 2.3 last check. Scheduled for paracentesis on 7/1 with albumen as needed per protocol.       Past Medical and Surgical History reviewed in Epic  today.    MEDICATIONS:  Current Outpatient Medications   Medication Sig Dispense Refill     folic acid (FOLVITE) 1 MG tablet Take 1 mg by mouth daily       gabapentin (NEURONTIN) 300 MG capsule Take 1 capsule (300 mg) by mouth 2 times daily       multivitamin, therapeutic (THERA-VIT) TABS tablet Take 1 tablet by mouth daily       nystatin (MYCOSTATIN) 341022 UNIT/GM external powder Apply topically 2 times daily       rivaroxaban ANTICOAGULANT (XARELTO) 15 MG TABS tablet Take 1 tablet (15 mg) by mouth daily (with dinner)       senna-docusate (SENOKOT-S/PERICOLACE) 8.6-50 MG tablet Take 1 tablet by mouth daily as needed Hold for loose stools. 1180 tablet 3     torsemide (DEMADEX) 20 MG tablet Take 20 mg by mouth 2 times daily        vitamin B1 (THIAMINE) 100 MG tablet Take 1 tablet (100 mg) by mouth daily         REVIEW OF SYSTEMS:  4 point ROS including Respiratory, CV, GI and , other than that noted in the HPI,  is negative    Objective:  /63   Pulse (!) 49   Temp 96.7  F (35.9  C)   Resp 14   Wt 123 kg (271 lb 1.6 oz)   SpO2 99%   BMI 34.81 kg/m    Exam:  Awake male, pleasantly confused. No distress  On room air, LS diminished right lower lung. No cough  Distended firm abdomen, distant BS  Edema right LE, s/p left BKA  No facial asymmetry, speech clear.     Labs:   6/28 Cr reported 2.42, Na 133, K 4.7   Most Recent 3 BMP's:  Recent Labs   Lab Test 06/21/19 06/19/19  0727 06/18/19  0658    140 140   POTASSIUM 4.5 3.9 3.9   CHLORIDE 108 111* 110*   CO2 20 26 24   BUN 27* 28 26   CR 1.73* 1.77* 1.82*   ANIONGAP 9 3 6   BENNIE 8.9 8.6 8.5   GLC 90 93 91       ASSESSMENT/PLAN:  1. Alcoholic cirrhosis of liver with ascites (H)    2. Chronic systolic congestive heart failure (H)    3. Paroxysmal atrial fibrillation (H)    4. Essential hypertension    5. CKD (chronic kidney disease) stage 3, GFR 30-59 ml/min (H)    ongoing issues, ascites does not respond to diuretics. Monitor respiratory status - update  provider if any deterioration. Scheduled for paracentesis on 7/1, hold xarelto 6/29 and 6/20    Orders written by provider at facility  Plan as previously ordered. Monitor.     Electronically signed by:  DIPAK Marshall CNP

## 2019-06-28 NOTE — LETTER
6/28/2019        RE: Antonio Ramirez  [address]  [place] [address]        [place] [facility]  [place] [facility] Record Number:  [id number]  Place of Service where encounter took place:  [facility] ([facility] [[id number]]  Chief Complaint   Patient presents with     Nursing Home Acute       HPI:    Antonio Ramirez  is a 74 year old (1945), who is being seen today for an episodic care visit.  HPI information obtained from: facility chart records, facility staff, patient report and [facility] chart review.     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  Had paracentesis 1 day prior to admission and developed chills and possible pneumonia treated with vanco and zosyn. Continues to drink alcohol and has known esophageal varices. Had encephalopathy and cardiac arrest x 2 (6/11/19), continues to be confused at times. Due to edema increased Torsemide to 20 mg BID and aldactone to 25 mg daily. Worsened renal function - stopped aldactone.     Today's concerns are:  Seen for episodic f/u due to weight gain up to 270 lb since admission, increased edema/abdominal distention. Notes breathing is at baseline, does have diminished LS RLL. Sats 99% on room air, no fevers. Ascites not responding to oral diuresis. Has standing orders for US paracentesis with albumin infusions if 6 liters or more are removed. Wt up one lb since yesterday, edema persists. Cr 2.43, up from 2.3 last check. Scheduled for paracentesis on 7/1 with albumen as needed  per protocol.       Past Medical and Surgical History reviewed in Epic today.    MEDICATIONS:  Current Outpatient Medications   Medication Sig Dispense Refill     folic acid (FOLVITE) 1 MG tablet Take 1 mg by mouth daily       gabapentin (NEURONTIN) 300 MG capsule Take 1 capsule (300 mg) by mouth 2 times daily       multivitamin, therapeutic (THERA-VIT) TABS tablet Take 1 tablet by mouth daily       nystatin (MYCOSTATIN) 800519 UNIT/GM external powder Apply topically 2 times daily       rivaroxaban ANTICOAGULANT (XARELTO) 15 MG TABS tablet Take 1 tablet (15 mg) by mouth daily (with dinner)       senna-docusate (SENOKOT-S/PERICOLACE) 8.6-50 MG tablet Take 1 tablet by mouth daily as needed Hold for loose stools. 1180 tablet 3     torsemide (DEMADEX) 20 MG tablet Take 20 mg by mouth 2 times daily        vitamin B1 (THIAMINE) 100 MG tablet Take 1 tablet (100 mg) by mouth daily         REVIEW OF SYSTEMS:  4 point ROS including Respiratory, CV, GI and , other than that noted in the HPI,  is negative    Objective:  /63   Pulse (!) 49   Temp 96.7  F (35.9  C)   Resp 14   Wt 123 kg (271 lb 1.6 oz)   SpO2 99%   BMI 34.81 kg/m     Exam:  Awake male, pleasantly confused. No distress  On room air, LS diminished right lower lung. No cough  Distended firm abdomen, distant BS  Edema right LE, s/p left BKA  No facial asymmetry, speech clear.     Labs:   6/28 Cr reported 2.42, Na 133, K 4.7   Most Recent 3 BMP's:  Recent Labs   Lab Test 06/21/19 06/19/19  0727 06/18/19  0658    140 140   POTASSIUM 4.5 3.9 3.9   CHLORIDE 108 111* 110*   CO2 20 26 24   BUN 27* 28 26   CR 1.73* 1.77* 1.82*   ANIONGAP 9 3 6   BENNIE 8.9 8.6 8.5   GLC 90 93 91       ASSESSMENT/PLAN:  1. Alcoholic cirrhosis of liver with ascites (H)    2. Chronic systolic congestive heart failure (H)    3. Paroxysmal atrial fibrillation (H)    4. Essential hypertension    5. CKD (chronic kidney disease) stage 3, GFR 30-59 ml/min (H)    ongoing issues,  ascites does not respond to diuretics. Monitor respiratory status - update provider if any deterioration. Scheduled for paracentesis on 7/1, hold xarelto 6/29 and 6/20    Orders written by provider at facility  Plan as previously ordered. Monitor.     Electronically signed by:  DIPAK Marshall CNP               Sincerely,        DIPAK Marshall CNP

## 2019-07-01 ENCOUNTER — TRANSFERRED RECORDS (OUTPATIENT)
Dept: HEALTH INFORMATION MANAGEMENT | Facility: CLINIC | Age: 74
End: 2019-07-01

## 2019-07-01 ENCOUNTER — HOSPITAL ENCOUNTER (OUTPATIENT)
Dept: ULTRASOUND IMAGING | Facility: CLINIC | Age: 74
End: 2019-07-01
Attending: INTERNAL MEDICINE
Payer: MEDICARE

## 2019-07-01 ENCOUNTER — HOSPITAL ENCOUNTER (OUTPATIENT)
Facility: CLINIC | Age: 74
Discharge: HOME | End: 2019-07-01
Admitting: INTERNAL MEDICINE
Payer: MEDICARE

## 2019-07-01 VITALS
WEIGHT: 270 LBS | OXYGEN SATURATION: 94 % | SYSTOLIC BLOOD PRESSURE: 124 MMHG | HEIGHT: 74 IN | BODY MASS INDEX: 34.65 KG/M2 | DIASTOLIC BLOOD PRESSURE: 54 MMHG | TEMPERATURE: 97.3 F | RESPIRATION RATE: 16 BRPM | HEART RATE: 64 BPM

## 2019-07-01 VITALS
TEMPERATURE: 97.5 F | DIASTOLIC BLOOD PRESSURE: 72 MMHG | OXYGEN SATURATION: 93 % | RESPIRATION RATE: 20 BRPM | HEART RATE: 67 BPM | SYSTOLIC BLOOD PRESSURE: 119 MMHG | BODY MASS INDEX: 35.51 KG/M2 | WEIGHT: 276.6 LBS

## 2019-07-01 DIAGNOSIS — K70.31 ALCOHOLIC CIRRHOSIS OF LIVER WITH ASCITES (H): ICD-10-CM

## 2019-07-01 LAB
DIFFERENTIAL: ABNORMAL
ERYTHROCYTE [DISTWIDTH] IN BLOOD BY AUTOMATED COUNT: 14.7 % (ref 11.9–15.5)
HCT VFR BLD AUTO: 28.6 % (ref 38.8–50)
HEMOGLOBIN: 9 G/DL (ref 13.5–17.5)
MCH RBC QN AUTO: 30.6 PG (ref 27.6–33.3)
MCHC RBC AUTO-ENTMCNC: 31.5 G/DL (ref 31.5–35.2)
MCV RBC AUTO: 97.3 FL (ref 80–100)
PLATELET # BLD AUTO: 178 10(9)/L (ref 150–450)
RBC # BLD AUTO: 2.94 10(12)/L (ref 4.32–5.72)
WBC # BLD AUTO: 5.2 10(9)/L (ref 3.5–10.5)

## 2019-07-01 PROCEDURE — 25000125 ZZHC RX 250: Performed by: INTERNAL MEDICINE

## 2019-07-01 PROCEDURE — 27210190 US PARACENTESIS

## 2019-07-01 PROCEDURE — 40000863 ZZH STATISTIC RADIOLOGY XRAY, US, CT, MAR, NM

## 2019-07-01 RX ORDER — DEXTROSE MONOHYDRATE 25 G/50ML
25-50 INJECTION, SOLUTION INTRAVENOUS
Status: DISCONTINUED | OUTPATIENT
Start: 2019-07-01 | End: 2019-07-01 | Stop reason: HOSPADM

## 2019-07-01 RX ORDER — ALBUMIN (HUMAN) 12.5 G/50ML
12.5 SOLUTION INTRAVENOUS ONCE
Status: DISCONTINUED | OUTPATIENT
Start: 2019-07-01 | End: 2019-07-01 | Stop reason: HOSPADM

## 2019-07-01 RX ORDER — NICOTINE POLACRILEX 4 MG
15-30 LOZENGE BUCCAL
Status: DISCONTINUED | OUTPATIENT
Start: 2019-07-01 | End: 2019-07-01 | Stop reason: HOSPADM

## 2019-07-01 RX ORDER — LIDOCAINE 40 MG/G
CREAM TOPICAL
Status: DISCONTINUED | OUTPATIENT
Start: 2019-07-01 | End: 2019-07-01 | Stop reason: HOSPADM

## 2019-07-01 RX ORDER — LIDOCAINE HYDROCHLORIDE 10 MG/ML
10 INJECTION, SOLUTION EPIDURAL; INFILTRATION; INTRACAUDAL; PERINEURAL ONCE
Status: COMPLETED | OUTPATIENT
Start: 2019-07-01 | End: 2019-07-01

## 2019-07-01 RX ADMIN — LIDOCAINE HYDROCHLORIDE 10 ML: 10 INJECTION, SOLUTION EPIDURAL; INFILTRATION; INTRACAUDAL; PERINEURAL at 10:47

## 2019-07-01 ASSESSMENT — MIFFLIN-ST. JEOR: SCORE: 2024.46

## 2019-07-01 NOTE — PROGRESS NOTES
[facility] Discharge Nursing Note    Education/questions answered: Yes  Patient DC location: [facility]  Accompanied by: [employer]  CS discharge time: 12:10

## 2019-07-01 NOTE — DISCHARGE INSTRUCTIONS

## 2019-07-02 ENCOUNTER — NURSING HOME VISIT (OUTPATIENT)
Dept: GERIATRICS | Facility: CLINIC | Age: 74
End: 2019-07-02
Payer: MEDICARE

## 2019-07-02 DIAGNOSIS — I48.0 PAROXYSMAL ATRIAL FIBRILLATION (H): ICD-10-CM

## 2019-07-02 DIAGNOSIS — I10 ESSENTIAL HYPERTENSION: ICD-10-CM

## 2019-07-02 DIAGNOSIS — K70.31 ALCOHOLIC CIRRHOSIS OF LIVER WITH ASCITES (H): Primary | ICD-10-CM

## 2019-07-02 DIAGNOSIS — I50.22 CHRONIC SYSTOLIC CONGESTIVE HEART FAILURE (H): ICD-10-CM

## 2019-07-02 DIAGNOSIS — N18.30 CKD (CHRONIC KIDNEY DISEASE) STAGE 3, GFR 30-59 ML/MIN (H): ICD-10-CM

## 2019-07-02 PROCEDURE — 99309 SBSQ NF CARE MODERATE MDM 30: CPT | Performed by: NURSE PRACTITIONER

## 2019-07-02 NOTE — LETTER
7/2/2019        RE: Antonio Ramirez  [address]  [place] [address]        [place] [facility]  [place] [facility] Record Number:  [id number]  Place of Service where encounter took place:  [facility] ([facility]) [[id number]]  Chief Complaint   Patient presents with     Nursing Home Acute       HPI:    Antonio Ramirez  is a 74 year old (1945), who is being seen today for an episodic care visit.  HPI information obtained from: facility chart records, facility staff, patient report and [facility] chart review.     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  Had paracentesis 1 day prior to admission and developed chills and possible pneumonia treated with vanco and zosyn. Continues to drink alcohol and has known esophageal varices. Had encephalopathy and cardiac arrest x 2 (6/11/19), continues to be confused at times. Due to edema increased Torsemide to 20 mg BID and aldactone to 25 mg daily. Worsened renal function - stopped aldactone.     Today's concerns are:  Seen for episodic f/u now s/p paracentesis - drained 5700 cc. Weight down from 276 lbs to 257 lbs today. Improved LE edema and was able to wear leg prosthesis, was up with therapies and did well. Breathing is at baseline, does have diminished LS RLL but improved from last week. Sats 99% on room air, no fevers. Cr 2.43, up from 2.3 last check. On Demadex 20 mg PO BID, BMP scheduled for 7/5.     Past Medical and Surgical History reviewed in Epic  today.    MEDICATIONS:  Current Outpatient Medications   Medication Sig Dispense Refill     folic acid (FOLVITE) 1 MG tablet Take 1 mg by mouth daily       gabapentin (NEURONTIN) 300 MG capsule Take 1 capsule (300 mg) by mouth 2 times daily       multivitamin, therapeutic (THERA-VIT) TABS tablet Take 1 tablet by mouth daily       nystatin (MYCOSTATIN) 523528 UNIT/GM external powder Apply topically 2 times daily       rivaroxaban ANTICOAGULANT (XARELTO) 15 MG TABS tablet Take 1 tablet (15 mg) by mouth daily (with dinner)       senna-docusate (SENOKOT-S/PERICOLACE) 8.6-50 MG tablet Take 1 tablet by mouth daily as needed Hold for loose stools. 1180 tablet 3     torsemide (DEMADEX) 20 MG tablet Take 20 mg by mouth 2 times daily        vitamin B1 (THIAMINE) 100 MG tablet Take 1 tablet (100 mg) by mouth daily         REVIEW OF SYSTEMS:  4 point ROS including Respiratory, CV, GI and , other than that noted in the HPI,  is negative    Objective:  /72   Pulse 67   Temp 97.5  F (36.4  C)   Resp 20   Wt 125.5 kg (276 lb 9.6 oz)   SpO2 93%   BMI 35.51 kg/m     Exam:  GENERAL APPEARANCE:  Alert, in no distress, pleasant, cooperative, oriented x self, place. Month/year as well  EYES:  EOM, lids, pupils and irises normal, sclera clear and conjunctiva normal, no discharge or mattering on lids or lashes noted  ENT:  Mouth normal, moist mucous membranes, nose normal without drainage or crusting, external ears without lesions, hearing acuity intact  RESP:  respiratory effort normal, no chest wall tenderness, no respiratory distress, Lung sounds diminished RLL but improved from last check, patient is on room air  CV:  Auscultation of heart done, rate and rhythm controlled and regular, soft systolic murmur, no rub or gallop. Edema +2 pitting bilateral lower extremities. Right lower leg skin red with dry scabs, no open areas.   ABDOMEN:  Distended but improved abdomen, hypoactive bowel sounds.   M/S:   Gait and station  bedbound, no tenderness or swelling of the joints; able to move all extremities, generalized weakness  NEURO: cranial nerves 2-12 grossly intact, no facial asymmetry, no speech deficits and able to follow directions  PSYCH:  insight and judgement and memory impaired, affect and mood normal     Labs:     Most Recent 3 CBC's:  Recent Labs   Lab Test 07/01/19 06/19/19  0727 06/18/19  0658   WBC 5.2 6.2 6.2   HGB 9.0* 10.7* 10.2*   MCV 97.3 95 95    131* 125*     Most Recent 3 BMP's:  Recent Labs   Lab Test 06/26/19 06/21/19 06/19/19  0727   * 137 140   POTASSIUM 5.0 4.5 3.9   CHLORIDE 106 108 111*   CO2 23 20 26   BUN 50* 27* 28   CR 2.43* 1.73* 1.77*   ANIONGAP 6* 9 3   BENNIE 8.6 8.9 8.6   GLC 84 90 93       ASSESSMENT/PLAN:  Alcoholic cirrhosis of liver with ascites (H)  Chronic systolic congestive heart failure (H)  CKD (chronic kidney disease) stage 3, GFR 30-59 ml/min (H)  Paroxysmal atrial fibrillation (H)  Essential hypertension  Ongoing issues, s/p paracentesis with improved weight and edema. No dyspnea. Continue Demadex at current dose, daily weights. BMP on 7/5 and f/u with results. Did c/o midline chest pain - suspect reflux. PRN Tums and Staff to update provider if not effective.     Orders written by provider at facility  TUMS 1000 mg PO now diagnosis epigastric/chest discomfort. Staff to update provider if not effective.   BMP on 7/5 diagnosis CKD  Resume Xarelto if not done already as previously ordered    Electronically signed by:  DIPAK Marshall CNP               Sincerely,        DIPAK Marshall CNP

## 2019-07-02 NOTE — PROGRESS NOTES
[place] [facility]  [place] Medical Record Number:  [id number]  Place of Service where encounter took place:  [facility] [[id number]]  Chief Complaint   Patient presents with     Nursing Home Acute       HPI:    Antonio Ramirez  is a 74 year old (1945), who is being seen today for an episodic care visit.  HPI information obtained from: facility chart records, facility staff, patient report and [facility] chart review.     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  Had paracentesis 1 day prior to admission and developed chills and possible pneumonia treated with vanco and zosyn. Continues to drink alcohol and has known esophageal varices. Had encephalopathy and cardiac arrest x 2 (6/11/19), continues to be confused at times. Due to edema increased Torsemide to 20 mg BID and aldactone to 25 mg daily. Worsened renal function - stopped aldactone.     Today's concerns are:  Seen for episodic f/u now s/p paracentesis - drained 5700 cc. Weight down from 276 lbs to 257 lbs today. Improved LE edema and was able to wear leg prosthesis, was up with therapies and did well. Breathing is at baseline, does have diminished LS RLL but improved from last week. Sats 99% on room air, no fevers. Cr 2.43, up from 2.3 last check. On Demadex 20 mg PO BID, BMP scheduled for 7/5.     Past Medical and Surgical History reviewed in Epic today.    MEDICATIONS:  Current Outpatient Medications   Medication Sig Dispense Refill     folic acid (FOLVITE) 1 MG  tablet Take 1 mg by mouth daily       gabapentin (NEURONTIN) 300 MG capsule Take 1 capsule (300 mg) by mouth 2 times daily       multivitamin, therapeutic (THERA-VIT) TABS tablet Take 1 tablet by mouth daily       nystatin (MYCOSTATIN) 918119 UNIT/GM external powder Apply topically 2 times daily       rivaroxaban ANTICOAGULANT (XARELTO) 15 MG TABS tablet Take 1 tablet (15 mg) by mouth daily (with dinner)       senna-docusate (SENOKOT-S/PERICOLACE) 8.6-50 MG tablet Take 1 tablet by mouth daily as needed Hold for loose stools. 1180 tablet 3     torsemide (DEMADEX) 20 MG tablet Take 20 mg by mouth 2 times daily        vitamin B1 (THIAMINE) 100 MG tablet Take 1 tablet (100 mg) by mouth daily         REVIEW OF SYSTEMS:  4 point ROS including Respiratory, CV, GI and , other than that noted in the HPI,  is negative    Objective:  /72   Pulse 67   Temp 97.5  F (36.4  C)   Resp 20   Wt 125.5 kg (276 lb 9.6 oz)   SpO2 93%   BMI 35.51 kg/m    Exam:  GENERAL APPEARANCE:  Alert, in no distress, pleasant, cooperative, oriented x self, place. Month/year as well  EYES:  EOM, lids, pupils and irises normal, sclera clear and conjunctiva normal, no discharge or mattering on lids or lashes noted  ENT:  Mouth normal, moist mucous membranes, nose normal without drainage or crusting, external ears without lesions, hearing acuity intact  RESP:  respiratory effort normal, no chest wall tenderness, no respiratory distress, Lung sounds diminished RLL but improved from last check, patient is on room air  CV:  Auscultation of heart done, rate and rhythm controlled and regular, soft systolic murmur, no rub or gallop. Edema +2 pitting bilateral lower extremities. Right lower leg skin red with dry scabs, no open areas.   ABDOMEN:  Distended but improved abdomen, hypoactive bowel sounds.   M/S:   Gait and station bedbound, no tenderness or swelling of the joints; able to move all extremities, generalized weakness  NEURO: cranial  nerves 2-12 grossly intact, no facial asymmetry, no speech deficits and able to follow directions  PSYCH:  insight and judgement and memory impaired, affect and mood normal     Labs:     Most Recent 3 CBC's:  Recent Labs   Lab Test 07/01/19 06/19/19  0727 06/18/19  0658   WBC 5.2 6.2 6.2   HGB 9.0* 10.7* 10.2*   MCV 97.3 95 95    131* 125*     Most Recent 3 BMP's:  Recent Labs   Lab Test 06/26/19 06/21/19 06/19/19  0727   * 137 140   POTASSIUM 5.0 4.5 3.9   CHLORIDE 106 108 111*   CO2 23 20 26   BUN 50* 27* 28   CR 2.43* 1.73* 1.77*   ANIONGAP 6* 9 3   BENNIE 8.6 8.9 8.6   GLC 84 90 93       ASSESSMENT/PLAN:  Alcoholic cirrhosis of liver with ascites (H)  Chronic systolic congestive heart failure (H)  CKD (chronic kidney disease) stage 3, GFR 30-59 ml/min (H)  Paroxysmal atrial fibrillation (H)  Essential hypertension  Ongoing issues, s/p paracentesis with improved weight and edema. No dyspnea. Continue Demadex at current dose, daily weights. BMP on 7/5 and f/u with results. Did c/o midline chest pain - suspect reflux. PRN Tums and Staff to update provider if not effective.     Orders written by provider at facility  TUMS 1000 mg PO now diagnosis epigastric/chest discomfort. Staff to update provider if not effective.   BMP on 7/5 diagnosis CKD  Resume Xarelto if not done already as previously ordered    Electronically signed by:  DIPAK Marshall CNP

## 2019-07-05 ENCOUNTER — TRANSFERRED RECORDS (OUTPATIENT)
Dept: HEALTH INFORMATION MANAGEMENT | Facility: CLINIC | Age: 74
End: 2019-07-05

## 2019-07-05 LAB
ANION GAP SERPL CALCULATED.3IONS-SCNC: 10 MMOL/L (ref 7–16)
BUN SERPL-MCNC: 40 MG/DL (ref 7–26)
CALCIUM SERPL-MCNC: 8.2 MG/DL (ref 8.4–10.4)
CHLORIDE SERPLBLD-SCNC: 104 MMOL/L (ref 98–109)
CO2 SERPL-SCNC: 25 MMOL/L (ref 20–29)
CREAT SERPL-MCNC: 1.66 MG/DL (ref 0.73–1.18)
GFR SERPL CREATININE-BSD FRML MDRD: 39 ML/MIN/1.73M2
GLUCOSE SERPL-MCNC: 75 MG/DL (ref 70–100)
POTASSIUM SERPL-SCNC: 3.7 MMOL/L (ref 3.5–5.1)
SODIUM SERPL-SCNC: 139 MMOL/L (ref 136–145)

## 2019-07-07 ENCOUNTER — TELEPHONE (OUTPATIENT)
Dept: GERIATRICS | Facility: CLINIC | Age: 74
End: 2019-07-07

## 2019-07-07 NOTE — TELEPHONE ENCOUNTER
Resident suffered a skin tear.  Once bleeding under control, steri strips used to approximate the tear.    No new orders from the NP.    Electronically signed by Viviane Lennon RN, CNP

## 2019-07-08 ENCOUNTER — NURSING HOME VISIT (OUTPATIENT)
Dept: GERIATRICS | Facility: CLINIC | Age: 74
End: 2019-07-08
Payer: MEDICARE

## 2019-07-08 VITALS
SYSTOLIC BLOOD PRESSURE: 113 MMHG | DIASTOLIC BLOOD PRESSURE: 64 MMHG | BODY MASS INDEX: 33.02 KG/M2 | WEIGHT: 257.2 LBS | HEART RATE: 76 BPM | OXYGEN SATURATION: 91 % | TEMPERATURE: 97.6 F | RESPIRATION RATE: 18 BRPM

## 2019-07-08 DIAGNOSIS — I48.0 PAROXYSMAL ATRIAL FIBRILLATION (H): ICD-10-CM

## 2019-07-08 DIAGNOSIS — N18.30 CKD (CHRONIC KIDNEY DISEASE) STAGE 3, GFR 30-59 ML/MIN (H): ICD-10-CM

## 2019-07-08 DIAGNOSIS — I50.22 CHRONIC SYSTOLIC CONGESTIVE HEART FAILURE (H): ICD-10-CM

## 2019-07-08 DIAGNOSIS — R53.81 PHYSICAL DECONDITIONING: ICD-10-CM

## 2019-07-08 DIAGNOSIS — I10 ESSENTIAL HYPERTENSION: ICD-10-CM

## 2019-07-08 DIAGNOSIS — K70.31 ALCOHOLIC CIRRHOSIS OF LIVER WITH ASCITES (H): Primary | ICD-10-CM

## 2019-07-08 PROCEDURE — 99309 SBSQ NF CARE MODERATE MDM 30: CPT | Performed by: NURSE PRACTITIONER

## 2019-07-08 NOTE — LETTER
"    7/8/2019        RE: Antonio Ramirez  [address]  [place] [address]        [place] [facility]  [place] Medical Record Number:  [id number]  Place of Service where encounter took place:  [facility] (S) [[id number]]  Chief Complaint   Patient presents with     Nursing Home Acute       HPI:    Antonio Ramirez  is a 74 year old (1945), who is being seen today for an episodic care visit.  HPI information obtained from: facility chart records, facility staff, patient report and [facility] chart review.     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  Had paracentesis 1 day prior to admission and developed chills and possible pneumonia treated with vanco and zosyn. Continues to drink alcohol and has known esophageal varices. Had encephalopathy and cardiac arrest x 2 (6/11/19), continues to be confused at times. Due to edema increased Torsemide to 20 mg BID and aldactone to 25 mg daily. Worsened renal function - stopped aldactone.   Patient is now s/p repeat paracentesis - drained 5700 cc. Weight down from 276 lbs to 257 lbs last week. Improved LE edema and was able to wear leg prosthesis, was up with therapies and did well. Cr 2.43, up from 2.3 last check. On Demadex 20 mg PO BID, BMP scheduled for 7/5.     Today's concern is:  Seen today for episodic TCU visit. Patient reports he is being \"fired\" from TCU. Per staff he is making very slow progress in therapies - walks up to 60 ft with prosthesis and walker, had a fall and " skin tear over the weekend. Last day of therapy will soon be picked and family asked for further plans on discharge home with care vs SNF. Patient denies headaches, dizziness, chest pain. Breathing at baseline, no cough. Edema managed with wraps, elevation of legs. Refused labs today as wasn't sure who ordered and what was being checked. Explained orders were for a renal function recheck and patient agreeable to get drawn tomorrow. Recent -166 and wt stable at 257 lbs since last paracentesis. sats are 91% on room air.     Past Medical and Surgical History reviewed in Epic today.    MEDICATIONS:  Current Outpatient Medications   Medication Sig Dispense Refill     folic acid (FOLVITE) 1 MG tablet Take 1 mg by mouth daily       gabapentin (NEURONTIN) 300 MG capsule Take 1 capsule (300 mg) by mouth 2 times daily       multivitamin, therapeutic (THERA-VIT) TABS tablet Take 1 tablet by mouth daily       nystatin (MYCOSTATIN) 179379 UNIT/GM external powder Apply topically 2 times daily       rivaroxaban ANTICOAGULANT (XARELTO) 15 MG TABS tablet Take 1 tablet (15 mg) by mouth daily (with dinner)       senna-docusate (SENOKOT-S/PERICOLACE) 8.6-50 MG tablet Take 1 tablet by mouth daily as needed Hold for loose stools. 1180 tablet 3     torsemide (DEMADEX) 20 MG tablet Take 20 mg by mouth 2 times daily        vitamin B1 (THIAMINE) 100 MG tablet Take 1 tablet (100 mg) by mouth daily         REVIEW OF SYSTEMS:  4 point ROS including Respiratory, CV, GI and , other than that noted in the HPI,  is negative    Objective:  /64   Pulse 76   Temp 97.6  F (36.4  C)   Resp 18   Wt 116.7 kg (257 lb 3.2 oz)   SpO2 91%   BMI 33.02 kg/m     Exam:  GENERAL APPEARANCE:  Alert, in no distress, pleasant, cooperative, oriented x self, place, recent events.   EYES:  EOM, lids, pupils and irises normal, sclera clear and conjunctiva normal, no discharge or mattering on lids or lashes noted  ENT:  Mouth normal, moist mucous  membranes, nose normal without drainage or crusting, external ears without lesions, hearing acuity intact  RESP:  respiratory effort normal, no chest wall tenderness, no respiratory distress, Lung sounds diminished RLL clear otherwise, patient is on room air  CV:  Auscultation of heart done, rate and rhythm controlled and regular today, no murmur, no rub or gallop. Edema +1 pitting bilateral lower extremities. VASCULAR: s/p left BKA.   ABDOMEN:  normal bowel sounds, soft, nontender, no palpable masses.  M/S:   Gait and station walks with assist , no tenderness or swelling of the joints; able to move all extremities  SKIN:  Inspection and palpation of skin and subcutaneous tissue: chronic redness right LE, occasional weeping - gauze dressing in place.  NEURO: cranial nerves 2-12 grossly intact, no facial asymmetry, no speech deficits and able to follow directions, moves all extremities symmetrically  PSYCH:  insight and judgement and memory impaired, affect and mood normal     Labs:     Most Recent 3 CBC's:  Recent Labs   Lab Test 07/01/19 06/19/19  0727 06/18/19  0658   WBC 5.2 6.2 6.2   HGB 9.0* 10.7* 10.2*   MCV 97.3 95 95    131* 125*     Most Recent 3 BMP's:  Recent Labs   Lab Test 07/05/19 06/26/19 06/21/19    135* 137   POTASSIUM 3.7 5.0 4.5   CHLORIDE 104 106 108   CO2 25 23 20   BUN 40* 50* 27*   CR 1.66* 2.43* 1.73*   ANIONGAP 10 6* 9   BENNIE 8.2* 8.6 8.9   GLC 75 84 90       ASSESSMENT/PLAN:  Alcoholic cirrhosis of liver with ascites (H)  Chronic systolic congestive heart failure (H)  Ongoing issues, patient chooses to continue drinking. Wt stable since most recent paracentesis - monitoring. Wt daily. Demadex as ordered.     CKD (chronic kidney disease) stage 3, GFR 30-59 ml/min (H)  Chronic, improved Cr last check. Repeat BMP on 7/9 if patient allows. Monitor.     Paroxysmal atrial fibrillation (H)  Chronic, rate controlled. Continues Xarelto. Monitor.     Essential hypertension  Chronic, SBP  in good range with current Demadex dose. VS, wt as ordered.    Physical deconditioning  Ongoing issues - reaching plateau, staff initiating TCU discharge planning.     Orders written by provider at facility  BMP 7/9 diagnosis CKD    Electronically signed by:  DIPAK Marshall CNP               Sincerely,        DIPAK Marshall CNP

## 2019-07-08 NOTE — PROGRESS NOTES
"[place] [facility]  [place] Medical Record Number:  [id number]  Place of Service where encounter took place:  [facility] (S) [[id number]]  Chief Complaint   Patient presents with     Nursing Home Acute       HPI:    Antonio Ramirez  is a 74 year old (1945), who is being seen today for an episodic care visit.  HPI information obtained from: facility chart records, facility staff, patient report and [facility] chart review.     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  Had paracentesis 1 day prior to admission and developed chills and possible pneumonia treated with vanco and zosyn. Continues to drink alcohol and has known esophageal varices. Had encephalopathy and cardiac arrest x 2 (6/11/19), continues to be confused at times. Due to edema increased Torsemide to 20 mg BID and aldactone to 25 mg daily. Worsened renal function - stopped aldactone.   Patient is now s/p repeat paracentesis - drained 5700 cc. Weight down from 276 lbs to 257 lbs last week. Improved LE edema and was able to wear leg prosthesis, was up with therapies and did well. Cr 2.43, up from 2.3 last check. On Demadex 20 mg PO BID, BMP scheduled for 7/5.     Today's concern is:  Seen today for episodic TCU visit. Patient reports he is being \"fired\" from TCU. Per staff he is making very slow progress in therapies - walks up to 60 ft with prosthesis and walker, had a fall and skin tear over the weekend. Last day of therapy will soon be picked and family asked for " further plans on discharge home with care vs SNF. Patient denies headaches, dizziness, chest pain. Breathing at baseline, no cough. Edema managed with wraps, elevation of legs. Refused labs today as wasn't sure who ordered and what was being checked. Explained orders were for a renal function recheck and patient agreeable to get drawn tomorrow. Recent -166 and wt stable at 257 lbs since last paracentesis. sats are 91% on room air.     Past Medical and Surgical History reviewed in Epic today.    MEDICATIONS:  Current Outpatient Medications   Medication Sig Dispense Refill     folic acid (FOLVITE) 1 MG tablet Take 1 mg by mouth daily       gabapentin (NEURONTIN) 300 MG capsule Take 1 capsule (300 mg) by mouth 2 times daily       multivitamin, therapeutic (THERA-VIT) TABS tablet Take 1 tablet by mouth daily       nystatin (MYCOSTATIN) 338218 UNIT/GM external powder Apply topically 2 times daily       rivaroxaban ANTICOAGULANT (XARELTO) 15 MG TABS tablet Take 1 tablet (15 mg) by mouth daily (with dinner)       senna-docusate (SENOKOT-S/PERICOLACE) 8.6-50 MG tablet Take 1 tablet by mouth daily as needed Hold for loose stools. 1180 tablet 3     torsemide (DEMADEX) 20 MG tablet Take 20 mg by mouth 2 times daily        vitamin B1 (THIAMINE) 100 MG tablet Take 1 tablet (100 mg) by mouth daily         REVIEW OF SYSTEMS:  4 point ROS including Respiratory, CV, GI and , other than that noted in the HPI,  is negative    Objective:  /64   Pulse 76   Temp 97.6  F (36.4  C)   Resp 18   Wt 116.7 kg (257 lb 3.2 oz)   SpO2 91%   BMI 33.02 kg/m    Exam:  GENERAL APPEARANCE:  Alert, in no distress, pleasant, cooperative, oriented x self, place, recent events.   EYES:  EOM, lids, pupils and irises normal, sclera clear and conjunctiva normal, no discharge or mattering on lids or lashes noted  ENT:  Mouth normal, moist mucous membranes, nose normal without drainage or crusting, external ears without lesions, hearing  acuity intact  RESP:  respiratory effort normal, no chest wall tenderness, no respiratory distress, Lung sounds diminished RLL clear otherwise, patient is on room air  CV:  Auscultation of heart done, rate and rhythm controlled and regular today, no murmur, no rub or gallop. Edema +1 pitting bilateral lower extremities. VASCULAR: s/p left BKA.   ABDOMEN:  normal bowel sounds, soft, nontender, no palpable masses.  M/S:   Gait and station walks with assist , no tenderness or swelling of the joints; able to move all extremities  SKIN:  Inspection and palpation of skin and subcutaneous tissue: chronic redness right LE, occasional weeping - gauze dressing in place.  NEURO: cranial nerves 2-12 grossly intact, no facial asymmetry, no speech deficits and able to follow directions, moves all extremities symmetrically  PSYCH:  insight and judgement and memory impaired, affect and mood normal     Labs:     Most Recent 3 CBC's:  Recent Labs   Lab Test 07/01/19 06/19/19  0727 06/18/19  0658   WBC 5.2 6.2 6.2   HGB 9.0* 10.7* 10.2*   MCV 97.3 95 95    131* 125*     Most Recent 3 BMP's:  Recent Labs   Lab Test 07/05/19 06/26/19 06/21/19    135* 137   POTASSIUM 3.7 5.0 4.5   CHLORIDE 104 106 108   CO2 25 23 20   BUN 40* 50* 27*   CR 1.66* 2.43* 1.73*   ANIONGAP 10 6* 9   BENNIE 8.2* 8.6 8.9   GLC 75 84 90       ASSESSMENT/PLAN:  Alcoholic cirrhosis of liver with ascites (H)  Chronic systolic congestive heart failure (H)  Ongoing issues, patient chooses to continue drinking. Wt stable since most recent paracentesis - monitoring. Wt daily. Demadex as ordered.     CKD (chronic kidney disease) stage 3, GFR 30-59 ml/min (H)  Chronic, improved Cr last check. Repeat BMP on 7/9 if patient allows. Monitor.     Paroxysmal atrial fibrillation (H)  Chronic, rate controlled. Continues Xarelto. Monitor.     Essential hypertension  Chronic, SBP in good range with current Demadex dose. VS, wt as ordered.    Physical  deconditioning  Ongoing issues - reaching plateau, staff initiating TCU discharge planning.     Orders written by provider at facility  BMP 7/9 diagnosis CKD    Electronically signed by:  DIPKA Marshall CNP

## 2019-07-09 ENCOUNTER — TRANSFERRED RECORDS (OUTPATIENT)
Dept: HEALTH INFORMATION MANAGEMENT | Facility: CLINIC | Age: 74
End: 2019-07-09

## 2019-07-09 LAB
ANION GAP SERPL CALCULATED.3IONS-SCNC: 11 MMOL/L (ref 7–16)
BUN SERPL-MCNC: 24 MG/DL (ref 7–26)
CALCIUM SERPL-MCNC: 8.6 MG/DL (ref 8.4–10.4)
CHLORIDE SERPLBLD-SCNC: 108 MMOL/L (ref 98–109)
CO2 SERPL-SCNC: 19 MMOL/L (ref 20–29)
CREAT SERPL-MCNC: 1.04 MG/DL (ref 0.73–1.18)
GFR SERPL CREATININE-BSD FRML MDRD: >60 ML/MIN/1.73M2
GLUCOSE SERPL-MCNC: 82 MG/DL (ref 70–100)
POTASSIUM SERPL-SCNC: 3.5 MMOL/L (ref 3.5–5.1)
SODIUM SERPL-SCNC: 138 MMOL/L (ref 136–145)

## 2019-07-11 ENCOUNTER — NURSING HOME VISIT (OUTPATIENT)
Dept: GERIATRICS | Facility: CLINIC | Age: 74
End: 2019-07-11
Payer: MEDICARE

## 2019-07-11 VITALS
BODY MASS INDEX: 33.6 KG/M2 | HEART RATE: 63 BPM | DIASTOLIC BLOOD PRESSURE: 64 MMHG | TEMPERATURE: 97.4 F | RESPIRATION RATE: 18 BRPM | SYSTOLIC BLOOD PRESSURE: 126 MMHG | OXYGEN SATURATION: 96 % | WEIGHT: 261.7 LBS

## 2019-07-11 DIAGNOSIS — R53.81 PHYSICAL DECONDITIONING: ICD-10-CM

## 2019-07-11 DIAGNOSIS — K70.31 ALCOHOLIC CIRRHOSIS OF LIVER WITH ASCITES (H): Primary | ICD-10-CM

## 2019-07-11 DIAGNOSIS — N18.30 CKD (CHRONIC KIDNEY DISEASE) STAGE 3, GFR 30-59 ML/MIN (H): ICD-10-CM

## 2019-07-11 DIAGNOSIS — I48.0 PAROXYSMAL ATRIAL FIBRILLATION (H): ICD-10-CM

## 2019-07-11 DIAGNOSIS — I50.22 CHRONIC SYSTOLIC CONGESTIVE HEART FAILURE (H): ICD-10-CM

## 2019-07-11 DIAGNOSIS — I10 ESSENTIAL HYPERTENSION: ICD-10-CM

## 2019-07-11 PROCEDURE — 99309 SBSQ NF CARE MODERATE MDM 30: CPT | Performed by: NURSE PRACTITIONER

## 2019-07-11 NOTE — LETTER
7/11/2019        RE: Antonio Ramirez  [address]  [place] [address]        [place] [facility]  [place] Medical Record Number:  [id number]  Place of Service where encounter took place:  [facility] (S) [[id number]]  Chief Complaint   Patient presents with     Nursing Home Acute       HPI:    Antonio Ramirez  is a 74 year old (1945), who is being seen today for an episodic care visit.  HPI information obtained from: facility chart records, facility staff, patient report and [facility] chart review.    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  Had paracentesis 1 day prior to admission and developed chills and possible pneumonia treated with vanco and zosyn. Continues to drink alcohol and has known esophageal varices. Had encephalopathy and cardiac arrest x 2 (6/11/19), continues to be confused at times. Due to edema increased Torsemide to 20 mg BID and aldactone to 25 mg daily. Worsened renal function - stopped aldactone.   Patient is now s/p repeat paracentesis - drained 5700 cc. Weight down from 276 lbs to 257 lbs last week. Improved LE edema and was able to wear leg prosthesis, was up with therapies and did well. Cr 2.43, up from 2.3 last check. On Demadex 20 mg PO BID.    Today's concern is:  Seen for follow up today. Wt up 2 lbs in one day. SBP range  and sats 96%. Reports doing well, continues therapies. No headaches, dizziness, chest pain. Reports ongoing LE edema. Bowels/bladder function without issues.    Pharmacy question whether Xarelto therapy should continue - reviewed chart, patient with a fib and hx DVT and PE, Xarelto will be continued without changes.   Note last cr improved back to baseline.   Lab Results   Component Value Date    CR 1.04 07/09/2019    CR 1.66 07/05/2019     Past Medical and Surgical History reviewed in Epic today.    MEDICATIONS:  Current Outpatient Medications   Medication Sig Dispense Refill     folic acid (FOLVITE) 1 MG tablet Take 1 mg by mouth daily       gabapentin (NEURONTIN) 300 MG capsule Take 1 capsule (300 mg) by mouth 2 times daily       multivitamin, therapeutic (THERA-VIT) TABS tablet Take 1 tablet by mouth daily       nystatin (MYCOSTATIN) 624698 UNIT/GM external powder Apply topically 2 times daily       rivaroxaban ANTICOAGULANT (XARELTO) 15 MG TABS tablet Take 1 tablet (15 mg) by mouth daily (with dinner)       senna-docusate (SENOKOT-S/PERICOLACE) 8.6-50 MG tablet Take 1 tablet by mouth daily as needed Hold for loose stools. 1180 tablet 3     torsemide (DEMADEX) 20 MG tablet Take 20 mg by mouth 2 times daily        vitamin B1 (THIAMINE) 100 MG tablet Take 1 tablet (100 mg) by mouth daily         REVIEW OF SYSTEMS:  4 point ROS including Respiratory, CV, GI and , other than that noted in the HPI,  is negative    Objective:  /64   Pulse 63   Temp 97.4  F (36.3  C)   Resp 18   Wt 118.7 kg (261 lb 11.2 oz)   SpO2 96%   BMI 33.60 kg/m     Exam:  GENERAL APPEARANCE:  Alert, in no distress, pleasant, cooperative, oriented x self, place, recent events.   EYES:  EOM, lids, pupils and irises normal, sclera clear and conjunctiva normal, no discharge or mattering on lids or lashes noted  ENT:  Mouth normal, moist mucous membranes, nose normal without drainage or crusting, external ears without lesions, hearing acuity intact  RESP:  respiratory effort normal, no chest wall tenderness, no respiratory distress, Lung sounds diminished RLL clear otherwise, patient is on  room air  CV:  Auscultation of heart done, rate and rhythm controlled and regular today, no murmur, no rub or gallop. Edema +1 pitting bilateral lower extremities. VASCULAR: s/p left BKA.   ABDOMEN:  normal bowel sounds, soft, nontender, no palpable masses.  M/S:   Gait and station walks with assist , no tenderness or swelling of the joints; able to move all extremities  SKIN:  Inspection and palpation of skin and subcutaneous tissue: chronic redness right LE open to air today  NEURO: cranial nerves 2-12 grossly intact, no facial asymmetry, no speech deficits and able to follow directions, moves all extremities symmetrically  PSYCH:  insight and judgement and memory impaired, affect and mood normal     Labs:     Most Recent 3 CBC's:  Recent Labs   Lab Test 07/01/19 06/19/19  0727 06/18/19  0658   WBC 5.2 6.2 6.2   HGB 9.0* 10.7* 10.2*   MCV 97.3 95 95    131* 125*     Most Recent 3 BMP's:  Recent Labs   Lab Test 07/09/19 07/05/19 06/26/19    139 135*   POTASSIUM 3.5 3.7 5.0   CHLORIDE 108 104 106   CO2 19* 25 23   BUN 24 40* 50*   CR 1.04 1.66* 2.43*   ANIONGAP 11 10 6*   BENNIE 8.6 8.2* 8.6   GLC 82 75 84     Most Recent 2 LFT's:  Recent Labs   Lab Test 06/19/19  0727 06/15/19  0651   AST 20 24   ALT 9 10   ALKPHOS 58 60   BILITOTAL 0.6 1.2       ASSESSMENT/PLAN:  Alcoholic cirrhosis of liver with ascites (H)  Chronic systolic congestive heart failure (H)  CKD (chronic kidney disease) stage 3, GFR 30-59 ml/min (H)  Essential hypertension  Chronic issues, wt gain over the past few days. Renal function back to baseline. Continues Demadex. Will add spironolactone x 2 days, then stop. F/U with BMP next week.     Paroxysmal atrial fibrillation (H)  Chronic, well managed. Xarelto therapy as ordered due to hx PE and DVT in the setting of a fib.     Physical deconditioning  Acute on chronic. Therapies, f/u with progress and discharge plans.     Orders written by provider at facility  Spironolactone 25 mg PO daily  x 2 days, then stop  BMP on 7/16 diagnosis CKD    Electronically signed by:  DIPAK Marshall CNP               Sincerely,        DIPAK Marshall CNP

## 2019-07-11 NOTE — PROGRESS NOTES
[place] [facility]  [place] Medical Record Number:  [id number]  Place of Service where encounter took place:  [facility] (S) [[id number]]  Chief Complaint   Patient presents with     Nursing Home Acute       HPI:    Antonio Ramirez  is a 74 year old (1945), who is being seen today for an episodic care visit.  HPI information obtained from: facility chart records, facility staff, patient report and [facility] chart review.    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  Had paracentesis 1 day prior to admission and developed chills and possible pneumonia treated with vanco and zosyn. Continues to drink alcohol and has known esophageal varices. Had encephalopathy and cardiac arrest x 2 (6/11/19), continues to be confused at times. Due to edema increased Torsemide to 20 mg BID and aldactone to 25 mg daily. Worsened renal function - stopped aldactone.   Patient is now s/p repeat paracentesis - drained 5700 cc. Weight down from 276 lbs to 257 lbs last week. Improved LE edema and was able to wear leg prosthesis, was up with therapies and did well. Cr 2.43, up from 2.3 last check. On Demadex 20 mg PO BID.    Today's concern is:  Seen for follow up today. Wt up 2 lbs in one day. SBP range  and sats 96%. Reports doing well, continues therapies. No headaches, dizziness, chest pain. Reports ongoing LE edema. Bowels/bladder function without issues.   Pharmacy question whether Xarelto therapy should continue - reviewed chart, patient with a  fib and hx DVT and PE, Xarelto will be continued without changes.   Note last cr improved back to baseline.   Lab Results   Component Value Date    CR 1.04 07/09/2019    CR 1.66 07/05/2019     Past Medical and Surgical History reviewed in Epic today.    MEDICATIONS:  Current Outpatient Medications   Medication Sig Dispense Refill     folic acid (FOLVITE) 1 MG tablet Take 1 mg by mouth daily       gabapentin (NEURONTIN) 300 MG capsule Take 1 capsule (300 mg) by mouth 2 times daily       multivitamin, therapeutic (THERA-VIT) TABS tablet Take 1 tablet by mouth daily       nystatin (MYCOSTATIN) 058875 UNIT/GM external powder Apply topically 2 times daily       rivaroxaban ANTICOAGULANT (XARELTO) 15 MG TABS tablet Take 1 tablet (15 mg) by mouth daily (with dinner)       senna-docusate (SENOKOT-S/PERICOLACE) 8.6-50 MG tablet Take 1 tablet by mouth daily as needed Hold for loose stools. 1180 tablet 3     torsemide (DEMADEX) 20 MG tablet Take 20 mg by mouth 2 times daily        vitamin B1 (THIAMINE) 100 MG tablet Take 1 tablet (100 mg) by mouth daily         REVIEW OF SYSTEMS:  4 point ROS including Respiratory, CV, GI and , other than that noted in the HPI,  is negative    Objective:  /64   Pulse 63   Temp 97.4  F (36.3  C)   Resp 18   Wt 118.7 kg (261 lb 11.2 oz)   SpO2 96%   BMI 33.60 kg/m    Exam:  GENERAL APPEARANCE:  Alert, in no distress, pleasant, cooperative, oriented x self, place, recent events.   EYES:  EOM, lids, pupils and irises normal, sclera clear and conjunctiva normal, no discharge or mattering on lids or lashes noted  ENT:  Mouth normal, moist mucous membranes, nose normal without drainage or crusting, external ears without lesions, hearing acuity intact  RESP:  respiratory effort normal, no chest wall tenderness, no respiratory distress, Lung sounds diminished RLL clear otherwise, patient is on room air  CV:  Auscultation of heart done, rate and rhythm controlled and regular today, no  murmur, no rub or gallop. Edema +1 pitting bilateral lower extremities. VASCULAR: s/p left BKA.   ABDOMEN:  normal bowel sounds, soft, nontender, no palpable masses.  M/S:   Gait and station walks with assist , no tenderness or swelling of the joints; able to move all extremities  SKIN:  Inspection and palpation of skin and subcutaneous tissue: chronic redness right LE open to air today  NEURO: cranial nerves 2-12 grossly intact, no facial asymmetry, no speech deficits and able to follow directions, moves all extremities symmetrically  PSYCH:  insight and judgement and memory impaired, affect and mood normal     Labs:     Most Recent 3 CBC's:  Recent Labs   Lab Test 07/01/19 06/19/19  0727 06/18/19  0658   WBC 5.2 6.2 6.2   HGB 9.0* 10.7* 10.2*   MCV 97.3 95 95    131* 125*     Most Recent 3 BMP's:  Recent Labs   Lab Test 07/09/19 07/05/19 06/26/19    139 135*   POTASSIUM 3.5 3.7 5.0   CHLORIDE 108 104 106   CO2 19* 25 23   BUN 24 40* 50*   CR 1.04 1.66* 2.43*   ANIONGAP 11 10 6*   BENNIE 8.6 8.2* 8.6   GLC 82 75 84     Most Recent 2 LFT's:  Recent Labs   Lab Test 06/19/19  0727 06/15/19  0651   AST 20 24   ALT 9 10   ALKPHOS 58 60   BILITOTAL 0.6 1.2       ASSESSMENT/PLAN:  Alcoholic cirrhosis of liver with ascites (H)  Chronic systolic congestive heart failure (H)  CKD (chronic kidney disease) stage 3, GFR 30-59 ml/min (H)  Essential hypertension  Chronic issues, wt gain over the past few days. Renal function back to baseline. Continues Demadex. Will add spironolactone x 2 days, then stop. F/U with BMP next week.     Paroxysmal atrial fibrillation (H)  Chronic, well managed. Xarelto therapy as ordered due to hx PE and DVT in the setting of a fib.     Physical deconditioning  Acute on chronic. Therapies, f/u with progress and discharge plans.     Orders written by provider at facility  Spironolactone 25 mg PO daily x 2 days, then stop  BMP on 7/16 diagnosis CKD    Electronically signed by:  Lilliana ABBOTT  Hammad, APRN CNP

## 2019-07-15 ENCOUNTER — TRANSFERRED RECORDS (OUTPATIENT)
Dept: HEALTH INFORMATION MANAGEMENT | Facility: CLINIC | Age: 74
End: 2019-07-15

## 2019-07-15 VITALS
TEMPERATURE: 97.7 F | SYSTOLIC BLOOD PRESSURE: 112 MMHG | OXYGEN SATURATION: 96 % | BODY MASS INDEX: 32.88 KG/M2 | HEART RATE: 70 BPM | WEIGHT: 256.1 LBS | DIASTOLIC BLOOD PRESSURE: 68 MMHG | RESPIRATION RATE: 16 BRPM

## 2019-07-15 LAB
DIFFERENTIAL: ABNORMAL
ERYTHROCYTE [DISTWIDTH] IN BLOOD BY AUTOMATED COUNT: 13.5 % (ref 11.9–15.5)
HCT VFR BLD AUTO: 27.2 % (ref 38.8–50)
HEMOGLOBIN: 8.5 G/DL (ref 13.5–17.5)
MCH RBC QN AUTO: 29.6 PG (ref 27.6–33.3)
MCHC RBC AUTO-ENTMCNC: 31.3 G/DL (ref 31.5–35.2)
MCV RBC AUTO: 94.8 FL (ref 80–100)
PLATELET # BLD AUTO: 197 X10(9)/L (ref 150–450)
RBC # BLD AUTO: 2.87 X10(12)/L (ref 4.32–5.72)
WBC # BLD AUTO: 5.6 X10(9)/L (ref 3.5–10.5)

## 2019-07-16 ENCOUNTER — NURSING HOME VISIT (OUTPATIENT)
Dept: GERIATRICS | Facility: CLINIC | Age: 74
End: 2019-07-16
Payer: MEDICARE

## 2019-07-16 DIAGNOSIS — I25.5 ISCHEMIC CARDIOMYOPATHY: ICD-10-CM

## 2019-07-16 DIAGNOSIS — N18.30 CKD (CHRONIC KIDNEY DISEASE) STAGE 3, GFR 30-59 ML/MIN (H): ICD-10-CM

## 2019-07-16 DIAGNOSIS — I50.22 CHRONIC SYSTOLIC CONGESTIVE HEART FAILURE (H): ICD-10-CM

## 2019-07-16 DIAGNOSIS — I10 ESSENTIAL HYPERTENSION: ICD-10-CM

## 2019-07-16 DIAGNOSIS — K70.31 ALCOHOLIC CIRRHOSIS OF LIVER WITH ASCITES (H): Primary | ICD-10-CM

## 2019-07-16 DIAGNOSIS — I48.0 PAROXYSMAL ATRIAL FIBRILLATION (H): ICD-10-CM

## 2019-07-16 DIAGNOSIS — I25.10 CORONARY ARTERY DISEASE INVOLVING NATIVE CORONARY ARTERY OF NATIVE HEART WITHOUT ANGINA PECTORIS: ICD-10-CM

## 2019-07-16 DIAGNOSIS — R41.89 COGNITIVE IMPAIRMENT: ICD-10-CM

## 2019-07-16 DIAGNOSIS — R53.81 PHYSICAL DECONDITIONING: ICD-10-CM

## 2019-07-16 PROCEDURE — 99309 SBSQ NF CARE MODERATE MDM 30: CPT | Performed by: NURSE PRACTITIONER

## 2019-07-16 PROCEDURE — 99207 ZZC CDG-CUT & PASTE-POTENTIAL IMPACT ON LEVEL: CPT | Performed by: NURSE PRACTITIONER

## 2019-07-16 NOTE — PROGRESS NOTES
[place] [facility]  [place] Medical Record Number:  [id number]  Place of Service where encounter took place:  [facility] (S) [[id number]]  Chief Complaint   Patient presents with     RECHECK       HPI:    Antonio Ramirez  is a 74 year old (1945), who is being seen today for an episodic care visit.  HPI information obtained from: facility chart records, facility staff, patient report and [facility] chart review. Today's concern is:  Alcoholic cirrhosis: continues to drink, wife taking patient out for meals peridically so he can drink alcohol  CHF: acute/ongoing: current weight is 256.1lbs which has been stable since last paracentesis  CKD: creat 1.09  HTN/PAF/CAD: /68, 102/63, 109/65 with HR in 70's denies CP, palpitations  Cognitive impairement: BIMS 12/15, SBT 9/28 with repeat SBT 14/28 which was worse than admit. Patient refuses CPT testing, therapy team recommend LTC but patient wanting to go home, therapy did do a home evaluation, patient had 2 large loose stools at home during the Eval and neither patient or wife could assist in getting cleaned up after episode. In therapy here in TCU patient needing max assist with ADL's, refuses to participate in dressing himself, he is able to walk up to 100 feet using a RW with CTG assist.     Past Medical and Surgical History reviewed in Epic today.    MEDICATIONS:  Current Outpatient Medications   Medication Sig Dispense Refill     folic acid (FOLVITE) 1 MG tablet Take 1 mg by mouth daily       gabapentin (NEURONTIN) 300 MG capsule Take 1 capsule (300 mg) by mouth 2 times daily       multivitamin, therapeutic (THERA-VIT) TABS tablet Take 1 tablet by mouth daily       nystatin (MYCOSTATIN) 081812 UNIT/GM external powder Apply topically 2 times daily       rivaroxaban ANTICOAGULANT (XARELTO) 15 MG TABS tablet Take 1 tablet (15 mg) by mouth daily (with dinner)       senna-docusate (SENOKOT-S/PERICOLACE) 8.6-50 MG tablet Take 1 tablet by  mouth daily as needed Hold for loose stools. 1180 tablet 3     torsemide (DEMADEX) 20 MG tablet Take 20 mg by mouth 2 times daily        vitamin B1 (THIAMINE) 100 MG tablet Take 1 tablet (100 mg) by mouth daily           REVIEW OF SYSTEMS:  10 point ROS of systems including Constitutional, Eyes, Respiratory, Cardiovascular, Gastroenterology, Genitourinary, Integumentary, Musculoskeletal, Psychiatric were all negative except for pertinent positives noted in my HPI.    Objective:  /68   Pulse 70   Temp 97.7  F (36.5  C)   Resp 16   Wt 116.2 kg (256 lb 1.6 oz)   SpO2 96%   BMI 32.88 kg/m    Exam:  Exam:  GENERAL APPEARANCE:  Alert, in no distress  ENT:  Mouth and posterior oropharynx normal, moist mucous membranes, [place]  EYES:  EOM, conjunctivae, lids, pupils and irises normal, PERRL  RESP:  respiratory effort and palpation of chest normal, lungs clear to auscultation , no respiratory distress  CV:  Palpation and auscultation of heart done , regular rate and rhythm, no murmur, rub, or gallop, peripheral edema 1+ in RLE, trace to LLE BKA  ABDOMEN:  normal bowel sounds, soft, nontender, no hepatosplenomegaly or other masses  M/S:   Examination of:   right upper extremity, left upper extremity and right lower extremity  Inspection, ROM, stability and muscle strength normal and left BKA, strength generalized weakness noted  SKIN:  Inspection of skin and subcutaneous tissue baseline, buttocks/coccyx reddened and inflammed  NEURO:   Cranial nerves 2-12 are normal tested and grossly at patient's baseline, speech wNl  PSYCH:  affect and mood normal    Labs:   Recent labs in [facility] reviewed by me today.      ASSESSMENT/PLAN:  Coronary artery disease involving native coronary artery of native heart without angina pectoris h/o CABG  Ischemic cardiomyopathy  Chronic systolic congestive heart failure (H)  Paroxysmal atrial fibrillation (H)  Essential hypertension  Acute/ongoing: PT and OT for strengthening, patient  continues to want full code, palliative care consulted and followed during hospitalization  Continue coreg 3.125mg BID and xarelto 15mg QD,  torsemide to  20mg BID no further aldactone  Last paracentesis was on 7/1/19 5700cc taken off abdomen     CKD (chronic kidney disease) stage 3, GFR 30-59 ml/min (H)  Ongoing: baseline creat 1.7-1.9  BMP stable creat 1.04     Alcoholic cirrhosis of liver with ascites (H)  Ongoing: patient continues to drink alcohol, not interested in quitting, last paracentesis 7/1/19   continue torsemide to 20mg BID   Continue thiamine 100mg QD and add folic acid 1mg QD     Encephalopathy  Cognitive impairment  Ongoing: recommend LTC placement, patient wanting to go home, continue PT and OT        Orders written by provider at facility  BMP in AM      Electronically signed by:  Tonya Lynn Haase, APRN CNP

## 2019-07-16 NOTE — LETTER
7/16/2019        RE: Antonio Ramirez  [address]  [place] [address]        [place] [facility]  [place] [facility] Record Number:  [id number]  Place of Service where encounter took place:  [facility] (S) [[id number]]  Chief Complaint   Patient presents with     RECHECK       HPI:    Antonio Ramirez  is a 74 year old (1945), who is being seen today for an episodic care visit.  HPI information obtained from: facility chart records, facility staff, patient report and [facility] chart review. Today's concern is:  Alcoholic cirrhosis: continues to drink, wife taking patient out for meals peridically so he can drink alcohol  CHF: acute/ongoing: current weight is 256.1lbs which has been stable since last paracentesis  CKD: creat 1.09  HTN/PAF/CAD: /68, 102/63, 109/65 with HR in 70's denies CP, palpitations  Cognitive impairement: BIMS 12/15, SBT 9/28 with repeat SBT 14/28 which was worse than admit. Patient refuses CPT testing, therapy team recommend LTC but patient wanting to go home, therapy did do a home evaluation, patient had 2 large loose stools at home during the Eval and neither patient or wife could assist in getting cleaned up after episode. In therapy here in TCU patient needing max assist with ADL's, refuses to participate in dressing himself, he is able to walk up to 100 feet using a RW with CTG assist.     Past Medical and Surgical History reviewed in Epic today.    MEDICATIONS:  Current Outpatient Medications   Medication Sig Dispense Refill     folic acid (FOLVITE) 1 MG tablet Take 1 mg by mouth daily       gabapentin (NEURONTIN) 300 MG capsule Take 1 capsule (300 mg) by mouth 2 times daily       multivitamin, therapeutic (THERA-VIT) TABS tablet Take 1 tablet by mouth daily       nystatin (MYCOSTATIN) 191760 UNIT/GM external powder Apply topically 2 times daily       rivaroxaban ANTICOAGULANT (XARELTO) 15 MG TABS tablet Take 1 tablet (15 mg) by mouth daily  (with dinner)       senna-docusate (SENOKOT-S/PERICOLACE) 8.6-50 MG tablet Take 1 tablet by mouth daily as needed Hold for loose stools. 1180 tablet 3     torsemide (DEMADEX) 20 MG tablet Take 20 mg by mouth 2 times daily        vitamin B1 (THIAMINE) 100 MG tablet Take 1 tablet (100 mg) by mouth daily           REVIEW OF SYSTEMS:  10 point ROS of systems including Constitutional, Eyes, Respiratory, Cardiovascular, Gastroenterology, Genitourinary, Integumentary, Musculoskeletal, Psychiatric were all negative except for pertinent positives noted in my HPI.    Objective:  /68   Pulse 70   Temp 97.7  F (36.5  C)   Resp 16   Wt 116.2 kg (256 lb 1.6 oz)   SpO2 96%   BMI 32.88 kg/m     Exam:  Exam:  GENERAL APPEARANCE:  Alert, in no distress  ENT:  Mouth and posterior oropharynx normal, moist mucous membranes, [place]  EYES:  EOM, conjunctivae, lids, pupils and irises normal, PERRL  RESP:  respiratory effort and palpation of chest normal, lungs clear to auscultation , no respiratory distress  CV:  Palpation and auscultation of heart done , regular rate and rhythm, no murmur, rub, or gallop, peripheral edema 1+ in RLE, trace to LLE BKA  ABDOMEN:  normal bowel sounds, soft, nontender, no hepatosplenomegaly or other masses  M/S:   Examination of:   right upper extremity, left upper extremity and right lower extremity  Inspection, ROM, stability and muscle strength normal and left BKA, strength generalized weakness noted  SKIN:  Inspection of skin and subcutaneous tissue baseline, buttocks/coccyx reddened and inflammed  NEURO:   Cranial nerves 2-12 are normal tested and grossly at patient's baseline, speech wNl  PSYCH:  affect and mood normal    Labs:   Recent labs in EPIC reviewed by me today.      ASSESSMENT/PLAN:  Coronary artery disease involving native coronary artery of native heart without angina pectoris h/o CABG  Ischemic cardiomyopathy  Chronic systolic congestive heart failure (H)  Paroxysmal atrial  fibrillation (H)  Essential hypertension  Acute/ongoing: PT and OT for strengthening, patient continues to want full code, palliative care consulted and followed during hospitalization  Continue coreg 3.125mg BID and xarelto 15mg QD,  torsemide to  20mg BID no further aldactone  Last paracentesis was on 7/1/19 5700cc taken off abdomen     CKD (chronic kidney disease) stage 3, GFR 30-59 ml/min (H)  Ongoing: baseline creat 1.7-1.9  BMP  stable creat 1.04     Alcoholic cirrhosis of liver with ascites (H)  Ongoing: patient continues to drink alcohol, not interested in quitting, last paracentesis 7/1/19   continue torsemide to 20mg BID   Continue thiamine 100mg QD and add folic acid 1mg QD     Encephalopathy  Cognitive impairment  Ongoing: recommend LTC placement, patient wanting to go home, continue PT and OT        Orders written by provider at facility  BMP in AM      Electronically signed by:  Tonya Lynn Haase, APRN CNP               Sincerely,        Tonya Lynn Haase, APRN CNP

## 2019-07-17 ENCOUNTER — TRANSFERRED RECORDS (OUTPATIENT)
Dept: HEALTH INFORMATION MANAGEMENT | Facility: CLINIC | Age: 74
End: 2019-07-17

## 2019-07-18 VITALS
BODY MASS INDEX: 32.71 KG/M2 | HEART RATE: 65 BPM | SYSTOLIC BLOOD PRESSURE: 115 MMHG | DIASTOLIC BLOOD PRESSURE: 61 MMHG | TEMPERATURE: 97.4 F | OXYGEN SATURATION: 100 % | RESPIRATION RATE: 16 BRPM | WEIGHT: 254.8 LBS

## 2019-07-19 ENCOUNTER — DISCHARGE SUMMARY NURSING HOME (OUTPATIENT)
Dept: GERIATRICS | Facility: CLINIC | Age: 74
End: 2019-07-19
Payer: MEDICARE

## 2019-07-19 ENCOUNTER — TRANSFERRED RECORDS (OUTPATIENT)
Dept: HEALTH INFORMATION MANAGEMENT | Facility: CLINIC | Age: 74
End: 2019-07-19

## 2019-07-19 DIAGNOSIS — R06.2 INSPIRATORY WHEEZE ON EXAMINATION: ICD-10-CM

## 2019-07-19 DIAGNOSIS — I10 ESSENTIAL HYPERTENSION: ICD-10-CM

## 2019-07-19 DIAGNOSIS — R05.9 COUGH: ICD-10-CM

## 2019-07-19 DIAGNOSIS — R13.10 DYSPHAGIA, UNSPECIFIED TYPE: ICD-10-CM

## 2019-07-19 DIAGNOSIS — I50.22 CHRONIC SYSTOLIC CONGESTIVE HEART FAILURE (H): ICD-10-CM

## 2019-07-19 DIAGNOSIS — N18.30 CKD (CHRONIC KIDNEY DISEASE) STAGE 3, GFR 30-59 ML/MIN (H): ICD-10-CM

## 2019-07-19 DIAGNOSIS — I48.0 PAROXYSMAL ATRIAL FIBRILLATION (H): ICD-10-CM

## 2019-07-19 DIAGNOSIS — K70.31 ALCOHOLIC CIRRHOSIS OF LIVER WITH ASCITES (H): Primary | ICD-10-CM

## 2019-07-19 DIAGNOSIS — R41.89 COGNITIVE IMPAIRMENT: ICD-10-CM

## 2019-07-19 DIAGNOSIS — I25.5 ISCHEMIC CARDIOMYOPATHY: ICD-10-CM

## 2019-07-19 DIAGNOSIS — I25.10 CORONARY ARTERY DISEASE INVOLVING NATIVE CORONARY ARTERY OF NATIVE HEART WITHOUT ANGINA PECTORIS: ICD-10-CM

## 2019-07-19 DIAGNOSIS — R53.81 PHYSICAL DECONDITIONING: ICD-10-CM

## 2019-07-19 PROCEDURE — 99310 SBSQ NF CARE HIGH MDM 45: CPT | Performed by: NURSE PRACTITIONER

## 2019-07-19 NOTE — LETTER
7/19/2019        RE: Antonio Ramirez  [address]  [place] [address]        [place] [facility]  [place] [facility] Record Number:  [id number]  Place of Service where encounter took place:  [facility] ([facility]) [[id number]]  Chief Complaint   Patient presents with     RECHECK       HPI:    Antonio Ramirez  is a 74 year old (1945), who is being seen today for an episodic care visit.  HPI information obtained from: facility chart records, facility staff, patient report and [facility] chart review. Today's concern is:  Cough/wheezing: patient has a cough, congestion, states he is unable to cough anything up, does have ongoing MCCRACKEN, no SOB at rest, see PE for lung sounds, denies coughing or choking with meals  Alcoholic cirrhosis: continues to drink, wife taking patient out for meals peridically so he can drink alcohol  CHF: acute/ongoing: current weight is 256.1lbs which has been stable since last paracentesis  CKD: creat   HTN/PAF/CAD: /61, 118/68, 112/68 with HR in 70's denies CP, palpitations  Cognitive impairement: BIMS 12/15, SBT 9/28 with repeat SBT 14/28 which was worse than admit. Patient refuses CPT testing, therapy team recommend LTC but patient insistant on going home.     Past Medical and Surgical History reviewed in Epic today.    MEDICATIONS:  Current Outpatient Medications   Medication Sig Dispense Refill     folic acid (FOLVITE) 1 MG tablet Take 1 mg by mouth daily       gabapentin (NEURONTIN) 300 MG capsule Take 1 capsule (300 mg) by mouth 2 times daily       multivitamin, therapeutic (THERA-VIT) TABS tablet Take 1 tablet by mouth daily       nystatin (MYCOSTATIN) 719882 UNIT/GM external powder Apply topically 2 times daily       rivaroxaban ANTICOAGULANT (XARELTO) 15 MG TABS tablet Take 1 tablet (15 mg) by mouth daily (with dinner)       senna-docusate (SENOKOT-S/PERICOLACE) 8.6-50 MG tablet Take 1 tablet by mouth daily as needed Hold for loose stools. 1180  tablet 3     torsemide (DEMADEX) 20 MG tablet Take 20 mg by mouth 2 times daily        vitamin B1 (THIAMINE) 100 MG tablet Take 1 tablet (100 mg) by mouth daily           REVIEW OF SYSTEMS:  10 point ROS of systems including Constitutional, Eyes, Respiratory, Cardiovascular, Gastroenterology, Genitourinary, Integumentary, Musculoskeletal, Psychiatric were all negative except for pertinent positives noted in my HPI.    Objective:  /61   Pulse 65   Temp 97.4  F (36.3  C)   Resp 16   Wt 115.6 kg (254 lb 12.8 oz)   SpO2 100%   BMI 32.71 kg/m     Exam:  Exam:  GENERAL APPEARANCE:  Alert, in no distress  ENT:  Mouth and posterior oropharynx normal, moist mucous membranes, [place]  EYES:  EOM, conjunctivae, lids, pupils and irises normal, PERRL  RESP:  respiratory effort and palpation of chest normal, lungs clear with insp wheezes through out fields, crackles bases bilaterally  CV:  Palpation and auscultation of heart done , regular rate and rhythm, no murmur, rub, or gallop, peripheral edema 1+ in RLE, trace to LLE BKA  ABDOMEN:  normal bowel sounds, soft, nontender, no hepatosplenomegaly or other masses  M/S:   Examination of:   right upper extremity, left upper extremity and right lower extremity  Inspection, ROM, stability and muscle strength normal and left BKA, strength generalized weakness noted  SKIN:  Inspection of skin and subcutaneous tissue baseline, buttocks/coccyx reddened and inflammed  NEURO:   Cranial nerves 2-12 are normal tested and grossly at patient's baseline, speech wNl  PSYCH:  affect and mood normal    Labs:   Recent labs in [facility] reviewed by me today.      ASSESSMENT/PLAN:  Cough  dsyphagia  Wheezing:   Acute: CXR stat        Coronary artery disease involving native coronary artery of native heart without angina pectoris h/o CABG  Ischemic cardiomyopathy  Chronic systolic congestive heart failure (H)  Paroxysmal atrial fibrillation (H)  Essential hypertension  Acute/ongoing: PT and OT for  strengthening, patient continues to want full code, palliative care consulted and followed during hospitalization  Continue coreg 3.125mg BID and xarelto 15mg QD,  torsemide to  20mg BID no further aldactone  Last paracentesis was on 7/1/19 5700cc taken off abdomen     CKD (chronic kidney disease) stage 3, GFR 30-59 ml/min (H)  Ongoing: baseline creat 1.7-1.9  BMP stable creat 1.04     Alcoholic cirrhosis of liver with ascites (H)  Ongoing: patient continues to drink alcohol, not interested in quitting, last paracentesis 7/1/19   continue torsemide to 20mg BID   Continue thiamine 100mg QD and add folic acid 1mg QD     Encephalopathy  Cognitive impairment  Ongoing: recommend LTC placement, patient wanting to go home, continue PT and OT        Orders written by provider at facility  CXR stat    Total time with patient visit: 40 minutes including discussions about the POC and care coordination with the patient. Greater than 50% of total time spent with counseling and coordinating care due to review chart, labs, IDT review, talked with nurse manager, talked to patient about needing to stay in TCU for evaluation of cough/congestion and possible Tx. .    Electronically signed by:  Tonya Lynn Haase, APRN CNP               Sincerely,        Tonya Lynn Haase, APRN CNP

## 2019-07-19 NOTE — PROGRESS NOTES
[place] [facility]  [place] [facility] Record Number:  [id number]  Place of Service where encounter took place:  [facility] ([facility]) [[id number]]  Chief Complaint   Patient presents with     RECHECK       HPI:    Antonio Ramirez  is a 74 year old (1945), who is being seen today for an episodic care visit.  HPI information obtained from: facility chart records, facility staff, patient report and [facility] chart review. Today's concern is:  Cough/wheezing: patient has a cough, congestion, states he is unable to cough anything up, does have ongoing MCCRACKEN, no SOB at rest, see PE for lung sounds, denies coughing or choking with meals  Alcoholic cirrhosis: continues to drink, wife taking patient out for meals peridically so he can drink alcohol  CHF: acute/ongoing: current weight is 256.1lbs which has been stable since last paracentesis  CKD: creat   HTN/PAF/CAD: /61, 118/68, 112/68 with HR in 70's denies CP, palpitations  Cognitive impairement: BIMS 12/15, SBT 9/28 with repeat SBT 14/28 which was worse than admit. Patient refuses CPT testing, therapy team recommend LTC but patient insistant on going home.     Past Medical and Surgical History reviewed in Epic today.    MEDICATIONS:  Current Outpatient Medications   Medication Sig Dispense Refill     folic acid (FOLVITE) 1 MG tablet Take 1 mg by mouth daily       gabapentin (NEURONTIN) 300 MG capsule Take 1 capsule (300 mg) by mouth 2 times daily       multivitamin, therapeutic (THERA-VIT) TABS tablet Take 1 tablet by mouth daily       nystatin (MYCOSTATIN) 763891 UNIT/GM external powder Apply topically 2 times daily       rivaroxaban ANTICOAGULANT (XARELTO) 15 MG TABS tablet Take 1 tablet (15 mg) by mouth daily (with dinner)       senna-docusate (SENOKOT-S/PERICOLACE) 8.6-50 MG tablet Take 1 tablet by mouth daily as needed Hold for loose stools. 1180 tablet 3     torsemide (DEMADEX) 20 MG tablet Take 20 mg by mouth 2 times daily        vitamin B1  (THIAMINE) 100 MG tablet Take 1 tablet (100 mg) by mouth daily           REVIEW OF SYSTEMS:  10 point ROS of systems including Constitutional, Eyes, Respiratory, Cardiovascular, Gastroenterology, Genitourinary, Integumentary, Musculoskeletal, Psychiatric were all negative except for pertinent positives noted in my HPI.    Objective:  /61   Pulse 65   Temp 97.4  F (36.3  C)   Resp 16   Wt 115.6 kg (254 lb 12.8 oz)   SpO2 100%   BMI 32.71 kg/m    Exam:  Exam:  GENERAL APPEARANCE:  Alert, in no distress  ENT:  Mouth and posterior oropharynx normal, moist mucous membranes, [place]  EYES:  EOM, conjunctivae, lids, pupils and irises normal, PERRL  RESP:  respiratory effort and palpation of chest normal, lungs clear with insp wheezes through out fields, crackles bases bilaterally  CV:  Palpation and auscultation of heart done , regular rate and rhythm, no murmur, rub, or gallop, peripheral edema 1+ in RLE, trace to LLE BKA  ABDOMEN:  normal bowel sounds, soft, nontender, no hepatosplenomegaly or other masses  M/S:   Examination of:   right upper extremity, left upper extremity and right lower extremity  Inspection, ROM, stability and muscle strength normal and left BKA, strength generalized weakness noted  SKIN:  Inspection of skin and subcutaneous tissue baseline, buttocks/coccyx reddened and inflammed  NEURO:   Cranial nerves 2-12 are normal tested and grossly at patient's baseline, speech wNl  PSYCH:  affect and mood normal    Labs:   Recent labs in EPIC reviewed by me today.      ASSESSMENT/PLAN:  Cough  dsyphagia  Wheezing:   Acute: CXR stat        Coronary artery disease involving native coronary artery of native heart without angina pectoris h/o CABG  Ischemic cardiomyopathy  Chronic systolic congestive heart failure (H)  Paroxysmal atrial fibrillation (H)  Essential hypertension  Acute/ongoing: PT and OT for strengthening, patient continues to want full code, palliative care consulted and followed during  hospitalization  Continue coreg 3.125mg BID and xarelto 15mg QD,  torsemide to  20mg BID no further aldactone  Last paracentesis was on 7/1/19 5700cc taken off abdomen     CKD (chronic kidney disease) stage 3, GFR 30-59 ml/min (H)  Ongoing: baseline creat 1.7-1.9  BMP stable creat 1.04     Alcoholic cirrhosis of liver with ascites (H)  Ongoing: patient continues to drink alcohol, not interested in quitting, last paracentesis 7/1/19   continue torsemide to 20mg BID   Continue thiamine 100mg QD and add folic acid 1mg QD     Encephalopathy  Cognitive impairment  Ongoing: recommend LTC placement, patient wanting to go home, continue PT and OT        Orders written by provider at facility  CXR stat    Total time with patient visit: 40 minutes including discussions about the POC and care coordination with the patient. Greater than 50% of total time spent with counseling and coordinating care due to review chart, labs, IDT review, talked with nurse manager, talked to patient about needing to stay in TCU for evaluation of cough/congestion and possible Tx. .    Electronically signed by:  Tonya Lynn Haase, APRN CNP

## 2019-07-22 ENCOUNTER — NURSING HOME VISIT (OUTPATIENT)
Dept: GERIATRICS | Facility: CLINIC | Age: 74
End: 2019-07-22
Payer: MEDICARE

## 2019-07-22 ENCOUNTER — PATIENT OUTREACH (OUTPATIENT)
Dept: CARE COORDINATION | Facility: CLINIC | Age: 74
End: 2019-07-22

## 2019-07-22 ENCOUNTER — TRANSFERRED RECORDS (OUTPATIENT)
Dept: HEALTH INFORMATION MANAGEMENT | Facility: CLINIC | Age: 74
End: 2019-07-22

## 2019-07-22 VITALS
BODY MASS INDEX: 32.69 KG/M2 | OXYGEN SATURATION: 94 % | DIASTOLIC BLOOD PRESSURE: 66 MMHG | RESPIRATION RATE: 18 BRPM | TEMPERATURE: 97.1 F | WEIGHT: 254.6 LBS | HEART RATE: 72 BPM | SYSTOLIC BLOOD PRESSURE: 120 MMHG

## 2019-07-22 DIAGNOSIS — I25.10 CORONARY ARTERY DISEASE INVOLVING NATIVE CORONARY ARTERY OF NATIVE HEART WITHOUT ANGINA PECTORIS: ICD-10-CM

## 2019-07-22 DIAGNOSIS — I10 ESSENTIAL HYPERTENSION: ICD-10-CM

## 2019-07-22 DIAGNOSIS — J18.9 PNA (PNEUMONIA): Primary | ICD-10-CM

## 2019-07-22 DIAGNOSIS — J18.9 PNEUMONIA, UNSPECIFIED ORGANISM: Primary | ICD-10-CM

## 2019-07-22 DIAGNOSIS — R53.81 PHYSICAL DECONDITIONING: ICD-10-CM

## 2019-07-22 DIAGNOSIS — I50.22 CHRONIC SYSTOLIC CONGESTIVE HEART FAILURE (H): ICD-10-CM

## 2019-07-22 DIAGNOSIS — K70.31 ALCOHOLIC CIRRHOSIS OF LIVER WITH ASCITES (H): ICD-10-CM

## 2019-07-22 DIAGNOSIS — N18.30 CKD (CHRONIC KIDNEY DISEASE) STAGE 3, GFR 30-59 ML/MIN (H): ICD-10-CM

## 2019-07-22 DIAGNOSIS — R13.10 DYSPHAGIA, UNSPECIFIED TYPE: ICD-10-CM

## 2019-07-22 DIAGNOSIS — I48.0 PAROXYSMAL ATRIAL FIBRILLATION (H): ICD-10-CM

## 2019-07-22 DIAGNOSIS — R41.89 COGNITIVE IMPAIRMENT: ICD-10-CM

## 2019-07-22 LAB
ANION GAP SERPL CALCULATED.3IONS-SCNC: 10 MMOL/L (ref 7–16)
BUN SERPL-MCNC: 22 MG/DL (ref 7–26)
CALCIUM SERPL-MCNC: 8.5 MG/DL (ref 8.4–10.4)
CHLORIDE SERPLBLD-SCNC: 100 MMOL/L (ref 98–109)
CO2 SERPL-SCNC: 28 MMOL/L (ref 20–29)
CREAT SERPL-MCNC: 1.6 MG/DL (ref 0.73–1.18)
DIFFERENTIAL: ABNORMAL
ERYTHROCYTE [DISTWIDTH] IN BLOOD BY AUTOMATED COUNT: 13.3 % (ref 11.9–15.5)
GFR SERPL CREATININE-BSD FRML MDRD: 41 ML/MIN/1.73M2
GLUCOSE SERPL-MCNC: 71 MG/DL (ref 70–100)
HCT VFR BLD AUTO: 26.8 % (ref 38.8–50)
HEMOGLOBIN: 8.4 G/DL (ref 13.5–17.5)
MCH RBC QN AUTO: 29.4 PG (ref 27.6–33.3)
MCHC RBC AUTO-ENTMCNC: 31.3 G/DL (ref 31.5–35.2)
MCV RBC AUTO: 93.7 FL (ref 80–100)
PLATELET # BLD AUTO: 177 10(9)/L (ref 150–450)
POTASSIUM SERPL-SCNC: 3.7 MMOL/L (ref 3.5–5.1)
RBC # BLD AUTO: 2.86 10(12)/L (ref 4.32–5.72)
SODIUM SERPL-SCNC: 138 MMOL/L (ref 136–145)
WBC # BLD AUTO: 5.3 10(9)/L (ref 3.5–10.5)

## 2019-07-22 PROCEDURE — 99309 SBSQ NF CARE MODERATE MDM 30: CPT | Performed by: NURSE PRACTITIONER

## 2019-07-22 RX ORDER — IPRATROPIUM BROMIDE AND ALBUTEROL SULFATE 2.5; .5 MG/3ML; MG/3ML
1 SOLUTION RESPIRATORY (INHALATION) 3 TIMES DAILY
COMMUNITY
End: 2019-08-12

## 2019-07-22 RX ORDER — AMOXICILLIN AND CLAVULANATE POTASSIUM 500; 125 MG/1; MG/1
1 TABLET, FILM COATED ORAL 2 TIMES DAILY
COMMUNITY
Start: 2019-07-19 | End: 2019-08-12

## 2019-07-22 NOTE — LETTER
"    7/22/2019        RE: Antonio Ramirez  [address]  [place] [address]        [place] [facility]  [place] [facility] Record Number:  [id number]  Place of Service where encounter took place:  [facility] ([facility]) [[id number]]  Chief Complaint   Patient presents with     RECHECK       HPI:    Antonio Ramirez  is a 74 year old (1945), who is being seen today for an episodic care visit.  HPI information obtained from: facility chart records, facility staff, patient report and [facility] chart review. Today's concern is:  Cough/wheezing:ON exam today patient states he is feeling \"much better\" denies states cough has decreased, he is still unable to cough anything up, denies fever, chills, SOB at rest, denies congestion, states he wants to go home today.   Alcoholic cirrhosis: ongoing, patient states he wants to go home, no intention of stopping drinking alcohol  CHF: acute/ongoing: current weight is 254.6lbs stable   CKD: creat 1.6 s   HTN/PAF/CAD: /66, 139/74, 116/65 with HR in 60-70's denies CP, palpitations  Cognitive impairement: BIMS 12/15, SBT 9/28 with repeat SBT 14/28 which was worse than admit. Patient refuses CPT testing, therapy team recommend LTC but patient insistant on going home.     Past Medical and Surgical History reviewed in Epic today.    MEDICATIONS:  Current Outpatient Medications   Medication Sig Dispense Refill     amoxicillin-clavulanate (AUGMENTIN) 500-125 MG tablet Take 1 tablet by mouth 2 times daily       folic acid (FOLVITE) 1 MG tablet Take 1 mg by mouth daily       gabapentin (NEURONTIN) 300 MG capsule Take 1 capsule (300 mg) by mouth 2 times daily       ipratropium - albuterol 0.5 mg/2.5 mg/3 mL (DUONEB) 0.5-2.5 (3) MG/3ML neb solution Take 1 vial by nebulization 3 times daily AND 1 vial inhale orally every 4 hours as needed for Pnuemonia       multivitamin, therapeutic (THERA-VIT) TABS tablet Take 1 tablet by mouth daily       nystatin " (MYCOSTATIN) 897348 UNIT/GM external powder Apply topically 2 times daily       rivaroxaban ANTICOAGULANT (XARELTO) 15 MG TABS tablet Take 1 tablet (15 mg) by mouth daily (with dinner)       senna-docusate (SENOKOT-S/PERICOLACE) 8.6-50 MG tablet Take 1 tablet by mouth daily as needed Hold for loose stools. 1180 tablet 3     torsemide (DEMADEX) 20 MG tablet Take 20 mg by mouth 2 times daily        vitamin B1 (THIAMINE) 100 MG tablet Take 1 tablet (100 mg) by mouth daily           REVIEW OF SYSTEMS:  10 point ROS of systems including Constitutional, Eyes, Respiratory, Cardiovascular, Gastroenterology, Genitourinary, Integumentary, Musculoskeletal, Psychiatric were all negative except for pertinent positives noted in my HPI.    Objective:  /66   Pulse 72   Temp 97.1  F (36.2  C)   Resp 18   Wt 115.5 kg (254 lb 9.6 oz)   SpO2 94%   BMI 32.69 kg/m     Exam:  Exam:  GENERAL APPEARANCE:  Alert, in no distress  ENT:  Mouth and posterior oropharynx normal, moist mucous membranes, [place]  EYES:  EOM, conjunctivae, lids, pupils and irises normal, PERRL  RESP:  respiratory effort and palpation of chest normal, lungs clear with insp wheezes through out fields, no other adventitious sounds appreciated  CV:  Palpation and auscultation of heart done , regular rate and rhythm, no murmur, rub, or gallop, peripheral edema 1+ in RLE, trace to LLE BKA  ABDOMEN:  normal bowel sounds, soft, nontender, no hepatosplenomegaly or other masses  M/S:   Examination of:   right upper extremity, left upper extremity and right lower extremity  Inspection, ROM, stability and muscle strength normal and left BKA, strength generalized weakness noted  SKIN:  Inspection of skin and subcutaneous tissue baseline, buttocks/coccyx reddened and inflammed  NEURO:   Cranial nerves 2-12 are normal tested and grossly at patient's baseline, speech wNl  PSYCH:  affect and mood normal    Labs:   Recent labs in [facility] reviewed by me today.    CXR on 7/19/19  shows moderate patchy right lower lung opacity with moderate right pleural fluid consistent with pneumonia     ASSESSMENT/PLAN:  Pneumonia/unsecified organism  dsyphagia  Acute: started on augmentin 500/125mg BID for 10 days )7/19/19), duonebs TID and q 4 hours prn,   Have ST evaluate,   CBC today WBC WNL    Coronary artery disease involving native coronary artery of native heart without angina pectoris h/o CABG  Ischemic cardiomyopathy  Chronic systolic congestive heart failure (H)  Paroxysmal atrial fibrillation (H)  Essential hypertension  Acute/ongoing: PT and OT for strengthening, patient continues to want full code, palliative care consulted and followed during hospitalization  Continue coreg 3.125mg BID and xarelto 15mg QD,  torsemide to  20mg BID   Last paracentesis was on 7/1/19 5700cc taken off abdomen     CKD (chronic kidney disease) stage 3, GFR 30-59 ml/min (H)  Ongoing: baseline creat 1.7-1.9  BMP stable creat 1.6     Alcoholic cirrhosis of liver with ascites (H)  Ongoing: patient continues to drink alcohol, not interested in quitting, last paracentesis 7/1/19   continue torsemide to 20mg BID   Continue thiamine 100mg QD and add folic acid 1mg QD     Encephalopathy  Cognitive impairment  Ongoing: recommend LTC placement, patient wanting to go home, continue PT and OT        Orders written by provider at facility  augmentin 500/125mg BID for 10 days started 7/19/19  duonebs TID and q 4 hours prn for 10 days started 7/19/19  CBC and BMP in AM  ST evaluation and treatment      Electronically signed by:  Tonya Lynn Haase, APRN CNP             Sincerely,        Tonya Lynn Haase, APRN CNP

## 2019-07-22 NOTE — PROGRESS NOTES
"[place] [facility]  [place] [facility] Record Number:  [id number]  Place of Service where encounter took place:  [facility] ([facility]) [[id number]]  Chief Complaint   Patient presents with     RECHECK       HPI:    Antonio Ramirez  is a 74 year old (1945), who is being seen today for an episodic care visit.  HPI information obtained from: facility chart records, facility staff, patient report and [facility] chart review. Today's concern is:  Cough/wheezing:ON exam today patient states he is feeling \"much better\" denies states cough has decreased, he is still unable to cough anything up, denies fever, chills, SOB at rest, denies congestion, states he wants to go home today.   Alcoholic cirrhosis: ongoing, patient states he wants to go home, no intention of stopping drinking alcohol  CHF: acute/ongoing: current weight is 254.6lbs stable   CKD: creat 1.6 s   HTN/PAF/CAD: /66, 139/74, 116/65 with HR in 60-70's denies CP, palpitations  Cognitive impairement: BIMS 12/15, SBT 9/28 with repeat SBT 14/28 which was worse than admit. Patient refuses CPT testing, therapy team recommend LTC but patient insistant on going home.     Past Medical and Surgical History reviewed in Epic today.    MEDICATIONS:  Current Outpatient Medications   Medication Sig Dispense Refill     amoxicillin-clavulanate (AUGMENTIN) 500-125 MG tablet Take 1 tablet by mouth 2 times daily       folic acid (FOLVITE) 1 MG tablet Take 1 mg by mouth daily       gabapentin (NEURONTIN) 300 MG capsule Take 1 capsule (300 mg) by mouth 2 times daily       ipratropium - albuterol 0.5 mg/2.5 mg/3 mL (DUONEB) 0.5-2.5 (3) MG/3ML neb solution Take 1 vial by nebulization 3 times daily AND 1 vial inhale orally every 4 hours as needed for Pnuemonia       multivitamin, therapeutic (THERA-VIT) TABS tablet Take 1 tablet by mouth daily       nystatin (MYCOSTATIN) 328960 UNIT/GM external powder Apply topically 2 times daily       rivaroxaban ANTICOAGULANT " (XARELTO) 15 MG TABS tablet Take 1 tablet (15 mg) by mouth daily (with dinner)       senna-docusate (SENOKOT-S/PERICOLACE) 8.6-50 MG tablet Take 1 tablet by mouth daily as needed Hold for loose stools. 1180 tablet 3     torsemide (DEMADEX) 20 MG tablet Take 20 mg by mouth 2 times daily        vitamin B1 (THIAMINE) 100 MG tablet Take 1 tablet (100 mg) by mouth daily           REVIEW OF SYSTEMS:  10 point ROS of systems including Constitutional, Eyes, Respiratory, Cardiovascular, Gastroenterology, Genitourinary, Integumentary, Musculoskeletal, Psychiatric were all negative except for pertinent positives noted in my HPI.    Objective:  /66   Pulse 72   Temp 97.1  F (36.2  C)   Resp 18   Wt 115.5 kg (254 lb 9.6 oz)   SpO2 94%   BMI 32.69 kg/m    Exam:  Exam:  GENERAL APPEARANCE:  Alert, in no distress  ENT:  Mouth and posterior oropharynx normal, moist mucous membranes, [place]  EYES:  EOM, conjunctivae, lids, pupils and irises normal, PERRL  RESP:  respiratory effort and palpation of chest normal, lungs clear with insp wheezes through out fields, no other adventitious sounds appreciated  CV:  Palpation and auscultation of heart done , regular rate and rhythm, no murmur, rub, or gallop, peripheral edema 1+ in RLE, trace to LLE BKA  ABDOMEN:  normal bowel sounds, soft, nontender, no hepatosplenomegaly or other masses  M/S:   Examination of:   right upper extremity, left upper extremity and right lower extremity  Inspection, ROM, stability and muscle strength normal and left BKA, strength generalized weakness noted  SKIN:  Inspection of skin and subcutaneous tissue baseline, buttocks/coccyx reddened and inflammed  NEURO:   Cranial nerves 2-12 are normal tested and grossly at patient's baseline, speech wNl  PSYCH:  affect and mood normal    Labs:   Recent labs in [facility] reviewed by me today.    CXR on 7/19/19 shows moderate patchy right lower lung opacity with moderate right pleural fluid consistent with pneumonia      ASSESSMENT/PLAN:  Pneumonia/unsecified organism  dsyphagia  Acute: started on augmentin 500/125mg BID for 10 days )7/19/19), duonebs TID and q 4 hours prn,   Have ST evaluate,   CBC today WBC WNL    Coronary artery disease involving native coronary artery of native heart without angina pectoris h/o CABG  Ischemic cardiomyopathy  Chronic systolic congestive heart failure (H)  Paroxysmal atrial fibrillation (H)  Essential hypertension  Acute/ongoing: PT and OT for strengthening, patient continues to want full code, palliative care consulted and followed during hospitalization  Continue coreg 3.125mg BID and xarelto 15mg QD,  torsemide to  20mg BID   Last paracentesis was on 7/1/19 5700cc taken off abdomen     CKD (chronic kidney disease) stage 3, GFR 30-59 ml/min (H)  Ongoing: baseline creat 1.7-1.9  BMP stable creat 1.6     Alcoholic cirrhosis of liver with ascites (H)  Ongoing: patient continues to drink alcohol, not interested in quitting, last paracentesis 7/1/19   continue torsemide to 20mg BID   Continue thiamine 100mg QD and add folic acid 1mg QD     Encephalopathy  Cognitive impairment  Ongoing: recommend LTC placement, patient wanting to go home, continue PT and OT        Orders written by provider at facility  augmentin 500/125mg BID for 10 days started 7/19/19  duonebs TID and q 4 hours prn for 10 days started 7/19/19  CBC and BMP in AM  ST evaluation and treatment      Electronically signed by:  Tonya Lynn Haase, APRN CNP

## 2019-07-22 NOTE — PROGRESS NOTES
Clinic Care Coordination Contact  Clinic Care Coordination Contact    Situation: Patient chart reviewed by care coordinator.    Background:[facility] admission 6/27-7/1/2019  Anoxic encephalopathy, status post cardiac arrest, resolved.  Cardiac arrest x2, during hospitalization.  Mild to moderate oropharyngeal dysphagia, improving.  Shock septic versus cardiogenic, resolved.  Community-acquired pneumonia versus aspiration pneumonia.  Alcoholic liver cirrhosis with recurrent ascites.  History of portal hypertension.  Anasarca from liver disease.  Acute kidney injury on chronic kidney disease, stage III.  Ongoing alcohol abuse/dependence.  History of antiphospholipid antibody syndrome.  History of DVT and pulmonary embolism, on anticoagulation.  Chronic systolic congestive heart failure with ejection fraction of 40 to 45%, compensated.  Coronary artery disease with history of three-vessel CAD bypass grafting.  Aortic stenosis, thoracic aortic aneurysm without rupture.  Essential hypertension.  Dyslipidemia.  Paroxysmal atrial fibrillation.  Peripheral neuropathy.  Chronic lymphedema.  Generalized weakness.  Pressure ulcer.      Assessment:Patient continues to be at the [facility] TCU     Plan/Recommendations: CC will follow up when discharged from the TCU    Xiao Huston RN, Care Coordinator   [facility] -Care Coordination  [facility] [place] [facility]   [phone]

## 2019-07-23 ENCOUNTER — TELEPHONE (OUTPATIENT)
Dept: FAMILY MEDICINE | Facility: CLINIC | Age: 74
End: 2019-07-23

## 2019-07-23 VITALS
DIASTOLIC BLOOD PRESSURE: 61 MMHG | HEART RATE: 68 BPM | SYSTOLIC BLOOD PRESSURE: 117 MMHG | TEMPERATURE: 97.4 F | OXYGEN SATURATION: 96 % | RESPIRATION RATE: 19 BRPM | WEIGHT: 254.6 LBS | BODY MASS INDEX: 32.69 KG/M2

## 2019-07-23 RX ORDER — AZITHROMYCIN 500 MG/1
TABLET, FILM COATED ORAL
COMMUNITY
Start: 2019-07-23 | End: 2019-08-12

## 2019-07-23 NOTE — TELEPHONE ENCOUNTER
See message below, are you ok with us adding this pt to your 430 same day on Friday (currently on your Monday schedule)?  Nevaeh Saldaña RN

## 2019-07-23 NOTE — TELEPHONE ENCOUNTER
Reason for call:  Patient reporting a symptom    Symptom or request: Pneumonia  Patient is at transition care at [facility] and has been on treatment for 5 days and said this is not working   He wants to see Dr Hardy to see if he doing the right treatment    Duration (how long have symptoms been present): about 10 days    Have you been treated for this before? Yes    Additional comments: being treated at [facility]  Please call patient to let him know what he needs to do     Phone Number patient can be reached at:  Home number on file [phone] (home)    Best Time:  anytime    Can we leave a detailed message on this number:  YES     Call taken on 7/23/2019 at 11:26 AM by Seble Bentley

## 2019-07-24 ENCOUNTER — NURSING HOME VISIT (OUTPATIENT)
Dept: GERIATRICS | Facility: CLINIC | Age: 74
End: 2019-07-24
Payer: MEDICARE

## 2019-07-24 VITALS
RESPIRATION RATE: 18 BRPM | HEART RATE: 69 BPM | OXYGEN SATURATION: 97 % | SYSTOLIC BLOOD PRESSURE: 114 MMHG | DIASTOLIC BLOOD PRESSURE: 66 MMHG | TEMPERATURE: 97 F | BODY MASS INDEX: 32.82 KG/M2 | WEIGHT: 255.6 LBS

## 2019-07-24 DIAGNOSIS — J18.9 PNEUMONIA, UNSPECIFIED ORGANISM: Primary | ICD-10-CM

## 2019-07-24 DIAGNOSIS — N18.30 CKD (CHRONIC KIDNEY DISEASE) STAGE 3, GFR 30-59 ML/MIN (H): ICD-10-CM

## 2019-07-24 DIAGNOSIS — I10 ESSENTIAL HYPERTENSION: ICD-10-CM

## 2019-07-24 DIAGNOSIS — R13.10 DYSPHAGIA, UNSPECIFIED TYPE: ICD-10-CM

## 2019-07-24 DIAGNOSIS — I50.22 CHRONIC SYSTOLIC CONGESTIVE HEART FAILURE (H): ICD-10-CM

## 2019-07-24 DIAGNOSIS — I48.0 PAROXYSMAL ATRIAL FIBRILLATION (H): ICD-10-CM

## 2019-07-24 DIAGNOSIS — I25.10 CORONARY ARTERY DISEASE INVOLVING NATIVE CORONARY ARTERY OF NATIVE HEART WITHOUT ANGINA PECTORIS: ICD-10-CM

## 2019-07-24 DIAGNOSIS — R53.81 PHYSICAL DECONDITIONING: ICD-10-CM

## 2019-07-24 DIAGNOSIS — R41.89 COGNITIVE IMPAIRMENT: ICD-10-CM

## 2019-07-24 DIAGNOSIS — K70.31 ALCOHOLIC CIRRHOSIS OF LIVER WITH ASCITES (H): ICD-10-CM

## 2019-07-24 PROCEDURE — 99309 SBSQ NF CARE MODERATE MDM 30: CPT | Performed by: NURSE PRACTITIONER

## 2019-07-24 PROCEDURE — 99207 ZZC CDG-MDM COMPONENT: MEETS LOW - DOWN CODED: CPT | Performed by: NURSE PRACTITIONER

## 2019-07-24 RX ORDER — GUAIFENESIN 600 MG/1
600 TABLET, EXTENDED RELEASE ORAL 2 TIMES DAILY
COMMUNITY
Start: 2019-07-24 | End: 2019-08-05

## 2019-07-24 NOTE — PROGRESS NOTES
[place] [facility]  [place] [facility] Record Number:  [id number]  Place of Service where encounter took place:  [facility] (S) [[id number]]  Chief Complaint   Patient presents with     RECHECK       HPI:    Antonio Ramirez  is a 74 year old (1945), who is being seen today for an episodic care visit.  HPI information obtained from: facility chart records, facility staff, patient report and [facility] chart review. Today's concern is:  Pneumonia: ON exam today patient is sleeping sitting up in WC, states he is feeling well, denies cough, congestion, SOB, states he has occasional SOB at rest, denies MCCRACKEN, afebrile, no other concerns at this time     Alcoholic cirrhosis: ongoing, patient states he wants to go home, no intention of stopping drinking alcohol  CHF: acute/ongoing: current weight is 254.6lbs stable at this time, denies increased SOB   CKD: creat 1.6    HTN/PAF/CAD: /61, 115/67, 120/66 with HR in 60-70's denies CP, palpitations  Cognitive impairement: BIMS 12/15, SBT 9/28 with repeat SBT 14/28 which was worse than admit. Patient refuses CPT testing, therapy team recommend LTC but patient insistant on going home.     Past Medical and Surgical History reviewed in Epic today.    MEDICATIONS:  Current Outpatient Medications   Medication Sig Dispense Refill     amoxicillin-clavulanate (AUGMENTIN) 500-125 MG tablet Take 1 tablet by mouth 2 times daily       azithromycin (ZITHROMAX) 500 MG tablet Give 500 mg by mouth one time a day for Pneumonia until 07/23/2019 23:59 500 mg's day one AND Give 250 mg by mouth at bedtime for pneumonia until 07/27/2019 23:59 250 mg's day 2,3,4,5       FEXOFENADINE HCL PO Take 600 mg by mouth 2 times daily       folic acid (FOLVITE) 1 MG tablet Take 1 mg by mouth daily       gabapentin (NEURONTIN) 300 MG capsule Take 1 capsule (300 mg) by mouth 2 times daily       ipratropium - albuterol 0.5 mg/2.5 mg/3 mL (DUONEB) 0.5-2.5 (3) MG/3ML neb solution Take 1  vial by nebulization 3 times daily AND 1 vial inhale orally every 4 hours as needed for Pnuemonia       multivitamin, therapeutic (THERA-VIT) TABS tablet Take 1 tablet by mouth daily       nystatin (MYCOSTATIN) 091749 UNIT/GM external powder Apply topically 2 times daily       rivaroxaban ANTICOAGULANT (XARELTO) 15 MG TABS tablet Take 1 tablet (15 mg) by mouth daily (with dinner)       senna-docusate (SENOKOT-S/PERICOLACE) 8.6-50 MG tablet Take 1 tablet by mouth daily as needed Hold for loose stools. 1180 tablet 3     torsemide (DEMADEX) 20 MG tablet Take 20 mg by mouth 2 times daily        vitamin B1 (THIAMINE) 100 MG tablet Take 1 tablet (100 mg) by mouth daily           REVIEW OF SYSTEMS:  10 point ROS of systems including Constitutional, Eyes, Respiratory, Cardiovascular, Gastroenterology, Genitourinary, Integumentary, Musculoskeletal, Psychiatric were all negative except for pertinent positives noted in my HPI.    Objective:  /61   Pulse 68   Temp 97.4  F (36.3  C)   Resp 19   Wt 115.5 kg (254 lb 9.6 oz)   SpO2 96%   BMI 32.69 kg/m    Exam:  Exam:  GENERAL APPEARANCE:  Alert, in no distress  ENT:  Mouth and posterior oropharynx normal, moist mucous membranes, [place]  EYES:  EOM, conjunctivae, lids, pupils and irises normal, PERRL  RESP:  respiratory effort and palpation of chest normal, lungs CTA bilaterally today, lung sounds much improved from last visit.   CV:  Palpation and auscultation of heart done , regular rate and rhythm, no murmur, rub, or gallop, peripheral edema 1+ in RLE, trace to LLE BKA  ABDOMEN:  normal bowel sounds, soft, nontender, no hepatosplenomegaly or other masses  M/S:   Examination of:   right upper extremity, left upper extremity and right lower extremity  Inspection, ROM, stability and muscle strength normal and left BKA, strength generalized weakness noted  SKIN:  Inspection of skin and subcutaneous tissue baseline, buttocks/coccyx reddened and inflammed  NEURO:   Cranial  nerves 2-12 are normal tested and grossly at patient's baseline, speech wNl  PSYCH:  affect and mood normal    Labs:   Recent labs in [facility] reviewed by me today.    CXR on 7/19/19 shows moderate patchy right lower lung opacity with moderate right pleural fluid consistent with pneumonia     ASSESSMENT/PLAN:  Pneumonia/unsecified organism  dsyphagia  Acute: started on augmentin 500/125mg BID for 10 days )7/19/19), zithromax 500mg for one day and 250mg QD for 4 days started on 7/23/19 duonebs TID and q 4 hours prn, add mucinex 600mg q 12 hours  ST treating   Labs stable  Repeat CXR on thursday    Coronary artery disease involving native coronary artery of native heart without angina pectoris h/o CABG  Ischemic cardiomyopathy  Chronic systolic congestive heart failure (H)  Paroxysmal atrial fibrillation (H)  Essential hypertension  Acute/ongoing: PT and OT for strengthening, patient continues to want full code, palliative care consulted and followed during hospitalization  Continue coreg 3.125mg BID and xarelto 15mg QD,  torsemide to  20mg BID   Last paracentesis was on 7/1/19 5700cc taken off abdomen     CKD (chronic kidney disease) stage 3, GFR 30-59 ml/min (H)  Ongoing: baseline creat 1.7-1.9  BMP stable creat 1.6     Alcoholic cirrhosis of liver with ascites (H)  Ongoing: patient continues to drink alcohol, not interested in quitting, last paracentesis 7/1/19   continue torsemide to 20mg BID   Continue thiamine 100mg QD and add folic acid 1mg QD     Cognitive impairment  Ongoing: recommend LTC placement, patient wanting to go home, continue PT and OT        Orders written by provider at facility  zithromax (Zpak) started 7/23/19  mucinex 600mg q 12 hours for 10 days  Repeat CXR on thursday      Electronically signed by:  Tonya Lynn Haase, APRN CNP

## 2019-07-24 NOTE — LETTER
7/24/2019        RE: Antonio Ramirez  [address]  [place] [address]        [facility]  [place] Medical Record Number:  [id number]  Place of Service where encounter took place:  [facility] (S) [[id number]]  Chief Complaint   Patient presents with     RECHECK       HPI:    Antonio Ramirez  is a 74 year old (1945), who is being seen today for an episodic care visit.  HPI information obtained from: facility chart records, facility staff, patient report and [facility] chart review. Today's concern is:  Pneumonia: ON exam today patient is sleeping sitting up in WC, states he is feeling well, denies cough, congestion, SOB, states he has occasional SOB at rest, denies MCCRACKEN, afebrile, no other concerns at this time     Alcoholic cirrhosis: ongoing, patient states he wants to go home, no intention of stopping drinking alcohol  CHF: acute/ongoing: current weight is 254.6lbs stable at this time, denies increased SOB   CKD: creat 1.6    HTN/PAF/CAD: /61, 115/67, 120/66 with HR in 60-70's denies CP, palpitations  Cognitive impairement: BIMS 12/15, SBT 9/28 with repeat SBT 14/28 which was worse than admit. Patient refuses CPT testing, therapy team recommend LTC but patient insistant on going home.     Past Medical and Surgical History reviewed in Epic today.    MEDICATIONS:  Current Outpatient Medications   Medication Sig Dispense Refill     amoxicillin-clavulanate (AUGMENTIN) 500-125 MG tablet Take 1 tablet by mouth 2 times daily       azithromycin (ZITHROMAX) 500 MG tablet Give 500 mg by mouth one time a day for Pneumonia until 07/23/2019 23:59 500 mg's day one AND Give 250 mg by mouth at bedtime for pneumonia until 07/27/2019 23:59 250 mg's day 2,3,4,5       FEXOFENADINE HCL PO Take 600 mg by mouth 2 times daily       folic acid (FOLVITE) 1 MG tablet Take 1 mg by mouth daily       gabapentin (NEURONTIN) 300 MG capsule Take 1 capsule (300 mg) by mouth 2 times daily        ipratropium - albuterol 0.5 mg/2.5 mg/3 mL (DUONEB) 0.5-2.5 (3) MG/3ML neb solution Take 1 vial by nebulization 3 times daily AND 1 vial inhale orally every 4 hours as needed for Pnuemonia       multivitamin, therapeutic (THERA-VIT) TABS tablet Take 1 tablet by mouth daily       nystatin (MYCOSTATIN) 386185 UNIT/GM external powder Apply topically 2 times daily       rivaroxaban ANTICOAGULANT (XARELTO) 15 MG TABS tablet Take 1 tablet (15 mg) by mouth daily (with dinner)       senna-docusate (SENOKOT-S/PERICOLACE) 8.6-50 MG tablet Take 1 tablet by mouth daily as needed Hold for loose stools. 1180 tablet 3     torsemide (DEMADEX) 20 MG tablet Take 20 mg by mouth 2 times daily        vitamin B1 (THIAMINE) 100 MG tablet Take 1 tablet (100 mg) by mouth daily           REVIEW OF SYSTEMS:  10 point ROS of systems including Constitutional, Eyes, Respiratory, Cardiovascular, Gastroenterology, Genitourinary, Integumentary, Musculoskeletal, Psychiatric were all negative except for pertinent positives noted in my HPI.    Objective:  /61   Pulse 68   Temp 97.4  F (36.3  C)   Resp 19   Wt 115.5 kg (254 lb 9.6 oz)   SpO2 96%   BMI 32.69 kg/m     Exam:  Exam:  GENERAL APPEARANCE:  Alert, in no distress  ENT:  Mouth and posterior oropharynx normal, moist mucous membranes, [place]  EYES:  EOM, conjunctivae, lids, pupils and irises normal, PERRL  RESP:  respiratory effort and palpation of chest normal, lungs CTA bilaterally today, lung sounds much improved from last visit.   CV:  Palpation and auscultation of heart done , regular rate and rhythm, no murmur, rub, or gallop, peripheral edema 1+ in RLE, trace to LLE BKA  ABDOMEN:  normal bowel sounds, soft, nontender, no hepatosplenomegaly or other masses  M/S:   Examination of:   right upper extremity, left upper extremity and right lower extremity  Inspection, ROM, stability and muscle strength normal and left BKA, strength generalized weakness noted  SKIN:  Inspection of  skin and subcutaneous tissue baseline, buttocks/coccyx reddened and inflammed  NEURO:   Cranial nerves 2-12 are normal tested and grossly at patient's baseline, speech wNl  PSYCH:  affect and mood normal    Labs:   Recent labs in [facility] reviewed by me today.    CXR on 7/19/19 shows moderate patchy right lower lung opacity with moderate right pleural fluid consistent with pneumonia     ASSESSMENT/PLAN:  Pneumonia/unsecified organism  dsyphagia  Acute: started on augmentin 500/125mg BID for 10 days )7/19/19), zithromax 500mg for one day and 250mg QD for 4 days started on 7/23/19 duonebs TID and q 4 hours prn, add mucinex 600mg q 12 hours  ST treating   Labs stable  Repeat CXR on thursday    Coronary artery disease involving native coronary artery of native heart without angina pectoris h/o CABG  Ischemic cardiomyopathy  Chronic systolic congestive heart failure (H)  Paroxysmal atrial fibrillation (H)  Essential hypertension  Acute/ongoing: PT and OT for strengthening, patient continues to want full code, palliative care consulted and followed during hospitalization  Continue coreg 3.125mg BID and xarelto 15mg QD,  torsemide to  20mg BID   Last paracentesis was on 7/1/19 5700cc taken off abdomen     CKD (chronic kidney disease) stage 3, GFR 30-59 ml/min (H)  Ongoing: baseline creat 1.7-1.9  BMP stable creat 1.6     Alcoholic cirrhosis of liver with ascites (H)  Ongoing: patient continues to drink alcohol, not interested in quitting, last paracentesis 7/1/19   continue torsemide to 20mg BID   Continue thiamine 100mg QD and add folic acid 1mg QD     Cognitive impairment  Ongoing: recommend LTC placement, patient wanting to go home, continue PT and OT        Orders written by provider at facility  zithromax (Zpak) started 7/23/19  mucinex 600mg q 12 hours for 10 days  Repeat CXR on thursday      Electronically signed by:  Tonya Lynn Haase, APRN CNP           Sincerely,        Tonya Lynn Haase, APRN CNP

## 2019-07-25 ENCOUNTER — DISCHARGE SUMMARY NURSING HOME (OUTPATIENT)
Dept: GERIATRICS | Facility: CLINIC | Age: 74
End: 2019-07-25
Payer: MEDICARE

## 2019-07-25 DIAGNOSIS — I10 ESSENTIAL HYPERTENSION: ICD-10-CM

## 2019-07-25 DIAGNOSIS — N18.30 CKD (CHRONIC KIDNEY DISEASE) STAGE 3, GFR 30-59 ML/MIN (H): ICD-10-CM

## 2019-07-25 DIAGNOSIS — I50.22 CHRONIC SYSTOLIC CONGESTIVE HEART FAILURE (H): ICD-10-CM

## 2019-07-25 DIAGNOSIS — J18.9 PNEUMONIA, UNSPECIFIED ORGANISM: Primary | ICD-10-CM

## 2019-07-25 DIAGNOSIS — K70.31 ALCOHOLIC CIRRHOSIS OF LIVER WITH ASCITES (H): ICD-10-CM

## 2019-07-25 DIAGNOSIS — R53.81 PHYSICAL DECONDITIONING: ICD-10-CM

## 2019-07-25 DIAGNOSIS — I48.0 PAROXYSMAL ATRIAL FIBRILLATION (H): ICD-10-CM

## 2019-07-25 DIAGNOSIS — R13.10 DYSPHAGIA, UNSPECIFIED TYPE: ICD-10-CM

## 2019-07-25 DIAGNOSIS — R41.89 COGNITIVE IMPAIRMENT: ICD-10-CM

## 2019-07-25 PROCEDURE — 99316 NF DSCHRG MGMT 30 MIN+: CPT | Performed by: NURSE PRACTITIONER

## 2019-07-25 NOTE — LETTER
7/25/2019        RE: Antonio Ramirez  [address]  [place] [address]        [facility] DISCHARGE SUMMARY  PATIENT'S NAME: Antonio Ramirez  YOB: 1945  MEDICAL RECORD NUMBER:  [id number]  Place of Service where encounter took place:  [facility] [[id number]]    PRIMARY CARE PROVIDER AND CLINIC RESPONSIBLE AFTER TRANSFER:   Main Hardy MD, [address] / [place] [address]    [facility] Provider     Transferring providers: Tonya Lynn Haase, APRN CNP, Dr. Master MD  Recent Hospitalization/ED:  [facility] stay 6/11/19 to 6/19/19.  Date of SNF Admission: June / 19 / 2019  Date of SNF (anticipated) Discharge: July / 25 / 2019  Discharged to: previous independent home  Cognitive Scores: BIMS 12/15, SBT 9/28  Physical Function: Ambulating 100 ft with RW with SBA to CTG assist  DME: Wheelchair and Walker    CODE STATUS/ADVANCE DIRECTIVES DISCUSSION:  Full Code     ALLERGIES: Ciprofloxacin and Hmg-coa-r inhibitors    DISCHARGE DIAGNOSIS/NURSING FACILITY COURSE:   Patient progressed slowly to walking up to 100 feet using a RW with SBA to CTG assist, depending on the day, patient function is labile. Patient needing assist with dressing, less assist upper body dressing and increased assist with lower body dressing. Unable to put prosthesis on by himself. On exam today patient states he is able to dress himself and as long as prosthesis is near him he is able to don prosthesis. Therapy concerned about discharge to home, a home assessment was done a week ago and patient or wife were able to clean up from bowel movement, unable to safetly transfer. Patient is insisting on going home at this point. Patient will have home PT, OT, RN and HHA ,  through [facility] and also vulnerable adult will be reported to [facility] social serices.     Past Medical History:  has a past medical history of Alcoholism (H), Amputated left leg (H),  Anemia, Anticardiolipin antibody syndrome (H), Antiphospholipid antibody syndrome (H), Antiplatelet or antithrombotic long-term use, Aortic root dilatation (H), Aortic stenosis, Arthritis, Balance problem, Coronary artery disease, Gastro-oesophageal reflux disease, Heart attack (H) (2007), Hiatal hernia, Hypercholesterolemia, Hypertension, Ischemic cardiomyopathy, Left foot drop, Liver disease, 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    Discharge Medications:  Current Outpatient Medications   Medication Sig Dispense Refill     amoxicillin-clavulanate (AUGMENTIN) 500-125 MG tablet Take 1 tablet by mouth 2 times daily       azithromycin (ZITHROMAX) 500 MG tablet Give 500 mg by mouth one time a day for Pneumonia until 07/23/2019 23:59 500 mg's day one AND Give 250 mg by mouth at bedtime for pneumonia until 07/27/2019 23:59 250 mg's day 2,3,4,5       folic acid (FOLVITE) 1 MG tablet Take 1 mg by mouth daily       gabapentin (NEURONTIN) 300 MG capsule Take 1 capsule (300 mg) by mouth 2 times daily       guaiFENesin (MUCINEX) 600 MG 12 hr tablet Take 600 mg by mouth 2 times daily       ipratropium - albuterol 0.5 mg/2.5 mg/3 mL (DUONEB) 0.5-2.5 (3) MG/3ML neb solution Take 1 vial by nebulization 3 times daily AND 1 vial inhale orally every 4 hours as needed for Pnuemonia       multivitamin, therapeutic (THERA-VIT) TABS tablet Take 1 tablet by mouth daily       nystatin (MYCOSTATIN) 253747 UNIT/GM external powder Apply topically 2 times daily       rivaroxaban ANTICOAGULANT (XARELTO) 15 MG TABS tablet Take 1 tablet (15 mg) by mouth daily (with dinner)       senna-docusate  (SENOKOT-S/PERICOLACE) 8.6-50 MG tablet Take 1 tablet by mouth daily as needed Hold for loose stools. 1180 tablet 3     torsemide (DEMADEX) 20 MG tablet Take 20 mg by mouth 2 times daily        vitamin B1 (THIAMINE) 100 MG tablet Take 1 tablet (100 mg) by mouth daily       FEXOFENADINE HCL PO Take 600 mg by mouth 2 times daily         Medication Changes/Rationale:     Medication adjustments made with torsemide current dose is 20mg BID and weight and labs stable on that dose    DC aldactone due to elevated creat.     Pneumonia Tx with augmentin 500/125mg BID for 10 days and zithromax (zpak) for 5 days    duonebs and mucinex started will change nebs to inhaler per patient request, he does not want to do nebs at home, states he is able to use inhaler.     Weight stable 255.6lbs, since last paracentesis weight and labs have been stable      Controlled medications sent with patient:   not applicable/none     ROS:   10 point ROS of systems including Constitutional, Eyes, Respiratory, Cardiovascular, Gastroenterology, Genitourinary, Integumentary, Musculoskeletal, Psychiatric were all negative except for pertinent positives noted in my HPI.    Physical Exam:   Vitals: /66   Pulse 69   Temp 97  F (36.1  C)   Resp 18   Wt 115.9 kg (255 lb 9.6 oz)   SpO2 97%   BMI 32.82 kg/m     BMI= Body mass index is 32.82 kg/m .  Exam:  GENERAL APPEARANCE:  Alert, in no distress  ENT:  Mouth and posterior oropharynx normal, moist mucous membranes, [place]  EYES:  EOM, conjunctivae, lids, pupils and irises normal, PERRL  RESP:  respiratory effort and palpation of chest normal, lungs left base fine crackles no other adventitious sounds appreciated,    CV:  Palpation and auscultation of heart done , regular rate and rhythm, no murmur, rub, or gallop, peripheral edema 1+ in RLE, trace to LLE BKA, abdominal ascites noted, stable at this time  ABDOMEN:  normal bowel sounds, soft, nontender, no hepatosplenomegaly or other  "masses  M/S:   Examination of:   right upper extremity, left upper extremity and right lower extremity  Inspection, ROM, stability and muscle strength normal and left BKA, strength generalized weakness noted  SKIN:  Inspection of skin and subcutaneous tissue baseline, buttocks/coccyx reddened and inflammed  NEURO:   Cranial nerves 2-12 are normal tested and grossly at patient's baseline, speech wNl  PSYCH:  affect and mood normal    SNF labs: Recent labs in [facility] reviewed by me today.   Repeat CXR today shows moderate right pleural effusion. Underlying pathology not excluded    ASSESSMENT/PLAN:  Pneumonia/unsecified organism  dsyphagia  Acute: started on augmentin 500/125mg BID for 10 days )7/19/19), zithromax 500mg for one day and 250mg QD for 4 days started on 7/23/19  Change duonebs to albuterol MDI 2 puffs TID and q 4 hours prn, continue mucinex 600mg q 12 hours  ST  state patient passes screening tool for swallowing, recommend swallow study for silent aspiration, patient states he will not consider thickened liquids for concerns with silent aspiration  Labs stable       Coronary artery disease involving native coronary artery of native heart without angina pectoris h/o CABG  Ischemic cardiomyopathy  Chronic systolic congestive heart failure (H)  Paroxysmal atrial fibrillation (H)  Essential hypertension  Acute/ongoing: DC home with home PT, OT, RN, HHA and  through [facility], patient continues to want full code, palliative care consulted and followed during hospitalization  Continue coreg 3.125mg BID and xarelto 15mg QD,  torsemide to  20mg BID   Last paracentesis was on 7/1/19 5700cc taken off abdomen     CKD (chronic kidney disease) stage 3, GFR 30-59 ml/min (H)  Ongoing: baseline creat 1.7-1.9  BMP stable creat 1.6     Alcoholic cirrhosis of liver with ascites (H)  Ongoing: counseled patient on drinking cessation, patient states he does not plan to drink at home states if I drink \"My wife will take " "my head off\"  last paracentesis 7/1/19   continue torsemide to 20mg BID   Continue thiamine 100mg QD and add folic acid 1mg QD     Cognitive impairment  Ongoing: recommend LTC placement, patient wanting to go home, DC home with home PT, OT, RN, HHA and , vulnerable adult report filed          DISCHARGE PLAN:    Follow up labs: No labs orders/due    Medical Follow Up:      Follow up with primary care provider in appt is 7/26/19 at 2:30pm    MT referral needed and placed by this provider: No    Current [place] scheduled appointments:  Next 5 appointments (look out 90 days)    Jul 29, 2019  2:00 PM CDT  [facility] [address] [address]  [phone]   Aug 12, 2019  3:00 PM CDT  [facility]  [facility] [address] [address]  [phone]           Discharge Services: Home Care:  Occupational Therapy, Physical Therapy, Registered Nurse, Home Health Aide and     Discharge Instructions Verbalized to Patient at Discharge:     None      TOTAL DISCHARGE TIME:   Greater than 30 minutes  Electronically signed by:  Tonya Lynn Haase, APRN CNP                         Sincerely,        Tonya Lynn Haase, APRN CNP    "

## 2019-07-25 NOTE — PROGRESS NOTES
[facility] DISCHARGE SUMMARY  PATIENT'S NAME: Antonio Ramirez  YOB: 1945  MEDICAL RECORD NUMBER:  [id number]  Place of Service where encounter took place:  [facility] ([facility]) [[id number]]    PRIMARY CARE PROVIDER AND CLINIC RESPONSIBLE AFTER TRANSFER:   Main Hardy MD, [address] / [place] [address]    G Provider     Transferring providers: Tonya Lynn Haase, APRN CNP, Dr. Master MD  Recent Hospitalization/ED:  [facility] Hospital stay 6/11/19 to 6/19/19.  Date of SNF Admission: June / 19 / 2019  Date of SNF (anticipated) Discharge: July / 25 / 2019  Discharged to: previous independent home  Cognitive Scores: BIMS 12/15, SBT 9/28  Physical Function: Ambulating 100 ft with RW with SBA to CTG assist  DME: Wheelchair and Walker    CODE STATUS/ADVANCE DIRECTIVES DISCUSSION:  Full Code     ALLERGIES: Ciprofloxacin and Hmg-coa-r inhibitors    DISCHARGE DIAGNOSIS/NURSING FACILITY COURSE:   Patient progressed slowly to walking up to 100 feet using a RW with SBA to CTG assist, depending on the day, patient function is labile. Patient needing assist with dressing, less assist upper body dressing and increased assist with lower body dressing. Unable to put prosthesis on by himself. On exam today patient states he is able to dress himself and as long as prosthesis is near him he is able to don prosthesis. Therapy concerned about discharge to home, a home assessment was done a week ago and patient or wife were able to clean up from bowel movement, unable to safetly transfer. Patient is insisting on going home at this point. Patient will have home PT, OT, RN and HHA ,  through [facility] and also vulnerable adult will be reported to \[facility] [place] [facility]\"" social serices.     Past Medical History:  has a past medical history of Alcoholism (H), Amputated left leg (H), Anemia, Anticardiolipin antibody syndrome (H), Antiphospholipid antibody syndrome (H), Antiplatelet  or antithrombotic long-term use, Aortic root dilatation (H), Aortic stenosis, Arthritis, Balance problem, Coronary artery disease, Gastro-oesophageal reflux disease, Heart attack (H) (2007), Hiatal hernia, Hypercholesterolemia, Hypertension, Ischemic cardiomyopathy, Left foot drop, Liver disease, 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    Discharge Medications:  Current Outpatient Medications   Medication Sig Dispense Refill     amoxicillin-clavulanate (AUGMENTIN) 500-125 MG tablet Take 1 tablet by mouth 2 times daily       azithromycin (ZITHROMAX) 500 MG tablet Give 500 mg by mouth one time a day for Pneumonia until 07/23/2019 23:59 500 mg's day one AND Give 250 mg by mouth at bedtime for pneumonia until 07/27/2019 23:59 250 mg's day 2,3,4,5       folic acid (FOLVITE) 1 MG tablet Take 1 mg by mouth daily       gabapentin (NEURONTIN) 300 MG capsule Take 1 capsule (300 mg) by mouth 2 times daily       guaiFENesin (MUCINEX) 600 MG 12 hr tablet Take 600 mg by mouth 2 times daily       ipratropium - albuterol 0.5 mg/2.5 mg/3 mL (DUONEB) 0.5-2.5 (3) MG/3ML neb solution Take 1 vial by nebulization 3 times daily AND 1 vial inhale orally every 4 hours as needed for Pnuemonia       multivitamin, therapeutic (THERA-VIT) TABS tablet Take 1 tablet by mouth daily       nystatin (MYCOSTATIN) 495621 UNIT/GM external powder Apply topically 2 times daily       rivaroxaban ANTICOAGULANT (XARELTO) 15 MG TABS tablet Take 1 tablet (15 mg) by mouth daily (with dinner)       senna-docusate (SENOKOT-S/PERICOLACE) 8.6-50 MG tablet Take 1 tablet by mouth daily as needed Hold for loose  stools. 1180 tablet 3     torsemide (DEMADEX) 20 MG tablet Take 20 mg by mouth 2 times daily        vitamin B1 (THIAMINE) 100 MG tablet Take 1 tablet (100 mg) by mouth daily       FEXOFENADINE HCL PO Take 600 mg by mouth 2 times daily         Medication Changes/Rationale:     Medication adjustments made with torsemide current dose is 20mg BID and weight and labs stable on that dose    DC aldactone due to elevated creat.     Pneumonia Tx with augmentin 500/125mg BID for 10 days and zithromax (zpak) for 5 days    duonebs and mucinex started will change nebs to inhaler per patient request, he does not want to do nebs at home, states he is able to use inhaler.     Weight stable 255.6lbs, since last paracentesis weight and labs have been stable      Controlled medications sent with patient:   not applicable/none     ROS:   10 point ROS of systems including Constitutional, Eyes, Respiratory, Cardiovascular, Gastroenterology, Genitourinary, Integumentary, Musculoskeletal, Psychiatric were all negative except for pertinent positives noted in my HPI.    Physical Exam:   Vitals: /66   Pulse 69   Temp 97  F (36.1  C)   Resp 18   Wt 115.9 kg (255 lb 9.6 oz)   SpO2 97%   BMI 32.82 kg/m    BMI= Body mass index is 32.82 kg/m .  Exam:  GENERAL APPEARANCE:  Alert, in no distress  ENT:  Mouth and posterior oropharynx normal, moist mucous membranes, [place]  EYES:  EOM, conjunctivae, lids, pupils and irises normal, PERRL  RESP:  respiratory effort and palpation of chest normal, lungs left base fine crackles no other adventitious sounds appreciated,    CV:  Palpation and auscultation of heart done , regular rate and rhythm, no murmur, rub, or gallop, peripheral edema 1+ in RLE, trace to LLE BKA, abdominal ascites noted, stable at this time  ABDOMEN:  normal bowel sounds, soft, nontender, no hepatosplenomegaly or other masses  M/S:   Examination of:   right upper extremity, left upper extremity and right lower  "extremity  Inspection, ROM, stability and muscle strength normal and left BKA, strength generalized weakness noted  SKIN:  Inspection of skin and subcutaneous tissue baseline, buttocks/coccyx reddened and inflammed  NEURO:   Cranial nerves 2-12 are normal tested and grossly at patient's baseline, speech wNl  PSYCH:  affect and mood normal    SNF labs: Recent labs in [facility] reviewed by me today.   Repeat CXR today shows moderate right pleural effusion. Underlying pathology not excluded    ASSESSMENT/PLAN:  Pneumonia/unsecified organism  dsyphagia  Acute: started on augmentin 500/125mg BID for 10 days )7/19/19), zithromax 500mg for one day and 250mg QD for 4 days started on 7/23/19  Change duonebs to albuterol MDI 2 puffs TID and q 4 hours prn, continue mucinex 600mg q 12 hours  ST  state patient passes screening tool for swallowing, recommend swallow study for silent aspiration, patient states he will not consider thickened liquids for concerns with silent aspiration  Labs stable       Coronary artery disease involving native coronary artery of native heart without angina pectoris h/o CABG  Ischemic cardiomyopathy  Chronic systolic congestive heart failure (H)  Paroxysmal atrial fibrillation (H)  Essential hypertension  Acute/ongoing: DC home with home PT, OT, RN, HHA and  through [facility], patient continues to want full code, palliative care consulted and followed during hospitalization  Continue coreg 3.125mg BID and xarelto 15mg QD,  torsemide to  20mg BID   Last paracentesis was on 7/1/19 5700cc taken off abdomen     CKD (chronic kidney disease) stage 3, GFR 30-59 ml/min (H)  Ongoing: baseline creat 1.7-1.9  BMP stable creat 1.6     Alcoholic cirrhosis of liver with ascites (H)  Ongoing: counseled patient on drinking cessation, patient states he does not plan to drink at home states if I drink \"My wife will take my head off\"  last paracentesis 7/1/19   continue torsemide to 20mg BID   Continue thiamine " 100mg QD and add folic acid 1mg QD     Cognitive impairment  Ongoing: recommend LTC placement, patient wanting to go home, DC home with home PT, OT, RN, HHA and , vulnerable adult report filed          DISCHARGE PLAN:    Follow up labs: No labs orders/due    Medical Follow Up:      Follow up with primary care provider in appt is 7/26/19 at 2:30pm    MTM referral needed and placed by this provider: No    Current [place] scheduled appointments:  Next 5 appointments (look out 90 days)    Jul 29, 2019  2:00 PM CDT  [facility] [address] [address]  [phone]   Aug 12, 2019  3:00 PM CDT  [facility] [address] [address]  [phone]           Discharge Services: Home Care:  Occupational Therapy, Physical Therapy, Registered Nurse, Home Health Aide and     Discharge Instructions Verbalized to Patient at Discharge:     None      TOTAL DISCHARGE TIME:   Greater than 30 minutes  Electronically signed by:  Tonya Lynn Haase, APRN CNP

## 2019-07-26 ENCOUNTER — TELEPHONE (OUTPATIENT)
Dept: FAMILY MEDICINE | Facility: CLINIC | Age: 74
End: 2019-07-26

## 2019-07-26 ENCOUNTER — OFFICE VISIT (OUTPATIENT)
Dept: FAMILY MEDICINE | Facility: CLINIC | Age: 74
End: 2019-07-26
Payer: MEDICARE

## 2019-07-26 ENCOUNTER — PATIENT OUTREACH (OUTPATIENT)
Dept: CARE COORDINATION | Facility: CLINIC | Age: 74
End: 2019-07-26

## 2019-07-26 VITALS
TEMPERATURE: 97.9 F | WEIGHT: 258 LBS | BODY MASS INDEX: 33.13 KG/M2 | DIASTOLIC BLOOD PRESSURE: 60 MMHG | SYSTOLIC BLOOD PRESSURE: 103 MMHG | OXYGEN SATURATION: 94 % | HEART RATE: 88 BPM

## 2019-07-26 DIAGNOSIS — K70.31 ALCOHOLIC CIRRHOSIS OF LIVER WITH ASCITES (H): Primary | ICD-10-CM

## 2019-07-26 DIAGNOSIS — I46.9 CARDIAC ARREST (H): Primary | ICD-10-CM

## 2019-07-26 PROCEDURE — 99495 TRANSJ CARE MGMT MOD F2F 14D: CPT | Performed by: INTERNAL MEDICINE

## 2019-07-26 NOTE — PATIENT INSTRUCTIONS
Over the counter Culturelle probiotic as directed (or generic equivalent).    One small glass of Kefir per day could do the same.  Can purchase in dairy section of grocery store.    Please call [place] [facility] [facility] at [phone] to schedule your paracentesis as needed.

## 2019-07-26 NOTE — PROGRESS NOTES
Subjective     Antonio Ramirez is a 74 year old male who presents to clinic today for the following health issues:    [facility]       Hospital Follow-up Visit:    Hospital/Nursing Home/IP Rehab Facility: [facility]   Date of Admission: 06/19/19  Date of Discharge: 07/25/19  Reason(s) for Admission: pneumonia            Problems taking medications regularly:  None       Medication changes since discharge: None       Problems adhering to non-medication therapy:  None  Maria G Betancourt MA    Summary of hospitalization:  [facility] discharge summary reviewed  Diagnostic Tests/Treatments reviewed.  Follow up needed: Cardiology  Other Healthcare Providers Involved in Patient s Care:           Update since discharge: improved.     Post Discharge Medication Reconciliation: discharge medications reconciled, continue medications without change.  Plan of care communicated with patient and family     Coding guidelines for this visit:  Type of Medical   Decision Making Face-to-Face Visit       within 7 Days of discharge Face-to-Face Visit        within 14 days of discharge   Moderate Complexity [id number]   High Complexity [id number]            74-year-old man hospitalized with cardiac arrest treated for pneumonia.  Had extended TCU stay.  Prognosis was thought to be poor, but he seemed to recover better than expected by treating physicians.  He is on 2 antibiotics for pneumonia.  His most recent chest x-ray showed a right pleural effusion but no obvious consolidated opacity.  He denies fevers or cough.  The swelling in his legs is severe but he has not been using his compression stockings.  He also has a distended abdomen.  His wife wonders whether he should be on a probiotic?  He feels very weak and tired but otherwise feels well and has no other specific complaints.    Patient Active Problem List   Diagnosis     Alcohol abuse     Alcoholic cirrhosis of liver (H)     Chronic kidney disease, stage III (moderate)  (H)     Physical deconditioning     Prostate cancer -- S/P [employer] implants 2000 (no problems since)     Antiphospholipid antibody syndrome (H)     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 (H)     Chronic congestive heart failure, unspecified congestive heart failure type     Thrombocytopenia -- suspect related to alcohol use     Hyperkalemia     Subdural hematoma (H)     Benign essential hypertension     Malabsorption of iron     Calculi, ureter     Anemia     CAD, S/P CABG in 2007     Aortic stenosis     Nonrheumatic mitral valve regurgitation     Nonrheumatic tricuspid valve regurgitation     Pulmonary hypertension (H)     Paroxysmal atrial fibrillation (H)     Ischemic cardiomyopathy     Aortic root dilatation (H)     Venous thrombosis     S/P total hip arthroplasty     S/P Left BKA 2017     Acute cystitis without hematuria     Traumatic hematoma of buttock     Acute blood loss anemia     CRF (chronic renal failure), stage 3 (moderate) (H)     MSSA bacteremia     H/O fall, recent     Hematoma of left lower extremity, subsequent encounter     Supratherapeutic INR     H/O deep venous thrombosis     History of pulmonary embolism     Essential hypertension     Chronic systolic congestive heart failure (H)     CKD (chronic kidney disease) stage 3, GFR 30-59 ml/min (H)     Phantom limb pain (H)     Debility     PNA (pneumonia)     Past Surgical History:   Procedure Laterality Date     AMPUTATE LEG BELOW KNEE Left 04/2014    related to gangrene, started as foot ulcer related to neuropathy     AMPUTATE LEG BELOW KNEE Left 12/4/2017    Procedure: AMPUTATE LEG BELOW KNEE;  Exploration of Left Leg Below Knee Amputation Stump with Ligation of Bleeders.;  Surgeon: Leandro Arreola MD;  Location:  OR     APPENDECTOMY       APPLY WOUND VAC Left 12/6/2017    Procedure: APPLY WOUND VAC;;  Surgeon: Saima Howard MD;  Location:  SD     ARTHROPLASTY HIP Right 11/27/2018    Procedure: RIGHT TOTAL HIP  ARTHROPLASTY;  Surgeon: Saima Howard MD;  Location:  OR     ARTHROPLASTY KNEE Right 9/19/2014    Procedure: ARTHROPLASTY KNEE;  Surgeon: Saima Howard MD;  Location:  OR     CARDIAC SURGERY      CABG     CHOLECYSTECTOMY       COLONOSCOPY       COMBINED CYSTOSCOPY, RETROGRADES, EXCHANGE STENT URETER(S) Left 7/24/2018    Procedure: COMBINED CYSTOSCOPY, RETROGRADES, EXCHANGE STENT URETER(S);  CYSTOSCOPY, BILATERAL RETROGRADES, BILATERAL URETERAL STENT EXCHANGE ;  Surgeon: Matt Cervantes MD;  Location:  OR     CRANIOTOMY Right 4/7/2018    Procedure: CRANIOTOMY;  Right Craniotomy For Subdural Hematoma;  Surgeon: Jono Issa MD;  Location:  OR     CYSTOSCOPY, DILATE URETHRA, COMBINED N/A 10/12/2016    Procedure: COMBINED CYSTOSCOPY, DILATE URETHRA;  Surgeon: Dimitry Bello MD;  Location:  OR     CYSTOSCOPY, REMOVE STENT(S), COMBINED Right 7/24/2018    Procedure: COMBINED CYSTOSCOPY, REMOVE STENT(S);;  Surgeon: Jose Hunter MD;  Location:  OR     ENDOSCOPIC STRIPPING VEIN(S)       esophagogastroduodenoscopy       ESOPHAGOSCOPY, GASTROSCOPY, DUODENOSCOPY (EGD), COMBINED N/A 3/11/2019    Procedure: COMBINED ESOPHAGOSCOPY, GASTROSCOPY, DUODENOSCOPY (EGD), BIOPSY SINGLE OR MULTIPLE;  Surgeon: Katherin Boyd MD;  Location:  GI     EYE SURGERY      cataracts     GENITOURINARY SURGERY      Prostate Seen Implants     HERNIA REPAIR      Umbilical     INCISION AND DRAINAGE LOWER EXTREMITY, COMBINED Left 12/1/2017    Procedure: COMBINED INCISION AND DRAINAGE LOWER EXTREMITY;  INCISION AND DRAINAGE OF LEFT BELOW KNEE AMPUTATION ABSCESS, WOUND VAC PLACEMENT;  Surgeon: Saima Howard MD;  Location:  OR     IR IVC FILTER PLACEMENT       IRRIGATION AND DEBRIDEMENT LOWER EXTREMITY, COMBINED Left 12/6/2017    Procedure: COMBINED IRRIGATION AND DEBRIDEMENT LOWER EXTREMITY;  IRRIGATION AND DEBRIDEMENT OF LEFT BELOW KNEE AMPUTATION SITE WITH WOUND VAC REPLACEMENT;   Surgeon: Saima Howard MD;  Location:  SD     IRRIGATION AND DEBRIDEMENT LOWER EXTREMITY, COMBINED Left 2017    Procedure: COMBINED IRRIGATION AND DEBRIDEMENT LOWER EXTREMITY;  IRRIGATION AND DEBRIDEMENT OF LEFT BELOW THE KNEE AMPUTATION AND SHORTENING OF THE TIBIA WITH WOUND CLOSURE;  Surgeon: Saima Howard MD;  Location:  OR     LASER HOLMIUM LITHOTRIPSY URETER(S), INSERT STENT, COMBINED Bilateral 2018    Procedure: COMBINED CYSTOSCOPY, URETEROSCOPY, LASER HOLMIUM LITHOTRIPSY URETER(S), INSERT STENT;  CYSTOSCOPY, BILATERAL URETEROSCOPY,  HOLMIUM LASER LITHOTRIPSY, BILATERAL STENT PLACEMENT, STONE BASKETING;  Surgeon: Jose Hunter MD;  Location:  OR     LASER HOLMIUM LITHOTRIPSY URETER(S), INSERT STENT, COMBINED Left 2018    Procedure: COMBINED CYSTOSCOPY, URETEROSCOPY, LASER HOLMIUM LITHOTRIPSY URETER(S), INSERT STENT;  CYSTOSCOPY, LEFT URETEROSCOPY, LEFT LASER HOLMIUM LITHOTRIPSY URETER(S) REMOVAL BILATERAL STENTS;  Surgeon: Jose Hunter MD;  Location:  OR     ORTHOPEDIC SURGERY      (R) knee scope     ORTHOPEDIC SURGERY      total hip      ORTHOPEDIC SURGERY      elbow surgery       Social History     Tobacco Use     Smoking status: Never Smoker     Smokeless tobacco: Never Used   Substance Use Topics     Alcohol use: Yes     Comment: quit 10/2015; now 3-4 Vodkas/night, 2019 abt 1-2 vodka's per night     Family History   Problem Relation Age of Onset     Lung Cancer Mother      Heart Failure Father          age 87         Current Outpatient Medications   Medication Sig Dispense Refill     amoxicillin-clavulanate (AUGMENTIN) 500-125 MG tablet Take 1 tablet by mouth 2 times daily       azithromycin (ZITHROMAX) 500 MG tablet Give 500 mg by mouth one time a day for Pneumonia until 2019 23:59 500 mg's day one AND Give 250 mg by mouth at bedtime for pneumonia until 2019 23:59 250 mg's day 2,3,4,5       FEXOFENADINE HCL PO Take 600 mg by mouth 2 times  "daily       folic acid (FOLVITE) 1 MG tablet Take 1 mg by mouth daily       gabapentin (NEURONTIN) 300 MG capsule Take 1 capsule (300 mg) by mouth 2 times daily (Patient taking differently: Take 600 mg by mouth 2 times daily )       guaiFENesin (MUCINEX) 600 MG 12 hr tablet Take 600 mg by mouth 2 times daily       ipratropium - albuterol 0.5 mg/2.5 mg/3 mL (DUONEB) 0.5-2.5 (3) MG/3ML neb solution Take 1 vial by nebulization 3 times daily AND 1 vial inhale orally every 4 hours as needed for Pnuemonia       multivitamin, therapeutic (THERA-VIT) TABS tablet Take 1 tablet by mouth daily       rivaroxaban ANTICOAGULANT (XARELTO) 15 MG TABS tablet Take 1 tablet (15 mg) by mouth daily (with dinner)       senna-docusate (SENOKOT-S/PERICOLACE) 8.6-50 MG tablet Take 1 tablet by mouth daily as needed Hold for loose stools. 1180 tablet 3     torsemide (DEMADEX) 20 MG tablet Take 20 mg by mouth 2 times daily        vitamin B1 (THIAMINE) 100 MG tablet Take 1 tablet (100 mg) by mouth daily       Allergies   Allergen Reactions     Ciprofloxacin Itching     Severe itching \"like ants were crawling\"     Hmg-Coa-R Inhibitors Other (See Comments)     Rhabdomyolysis occurred within a couple days       Reviewed and updated as needed this visit by Provider         Review of Systems   ROS COMP: Constitutional, HEENT, cardiovascular, pulmonary, gi and gu systems are negative, except as otherwise noted.      Objective    /60 (BP Location: Right arm, Cuff Size: Adult Regular)   Pulse 88   Temp 97.9  F (36.6  C) (Oral)   Wt 117 kg (258 lb)   SpO2 94%   BMI 33.13 kg/m    Body mass index is 33.13 kg/m .  Physical Exam     General: This is a chronically ill-appearing man sitting in wheelchair.  He speaks in full sentences.  He does not appear toxic.  Cardiovascular: The heart has a regular rate and rhythm through multiple cardiac cycles there is an unchanged cardiac murmur consistent with his known moderate aortic stenosis.  Pulmonary: " The lungs are clear to auscultation with the exception of decreased breath sounds at the right lung base, breathing is not labored.  Abdomen, distended, bulging flanks, no tenderness.  Extremities: There is edema extending to mid thigh bilaterally with chronic venous stasis skin changes in the right lower extremity.  Again the left lower extremity is amputated.  Neurological: The patient is alert and oriented to person place and time, more memory deficits are present and prior to his hospitalization although these are mild, he moves all extremities with symmetric strength although he is in a wheelchair.  Mental status: Appropriate mood and affect, well-groomed, insight and judgment has been impaired throughout.    Diagnostic Test Results:  Repeat renal function tests        Assessment & Plan     1. Alcoholic cirrhosis of liver with ascites (H)      This is a 74-year-old man with advanced alcoholic cirrhotic liver disease with ascites and anasarca who presents following being hospitalized for multiple cardiac arrests.  The etiology of his cardiac arrest remains unclear.  He seems to have recovered better than what was expected at the time of his discharge.  I would anticipate that he remains at risk for further cardiac arrest.  He should consult with his cardiologist Dr. Downs or one of her partners to see if any further interventions are needed as secondary prevention for cardiac arrest.  Obviously, cessation from alcohol would be the most important intervention to avoid this complication.  The patient's providers during his hospitalization were leaning towards a more palliative approach to his care, but having survived these cardiac events, the patient and his wife are leaning towards a more aggressive approach towards his care and hope for recovery.  He could probably benefit from an additional therapeutic paracentesis given the amount of abdominal distention.  His lower extremities are severely edematous.   Spironolactone was discontinued in the hospital due to concerns about his renal function.  We will recheck his renal function today.  He will return to using his compression wraps to manage his lower extremity edema.  Overall, his prognosis remains extremely guarded.  - Basic metabolic panel     Return in about 1 month (around 8/26/2019) for recheck pneumonia .    Main Hardy MD  [facility]

## 2019-07-26 NOTE — PROGRESS NOTES
Clinic Care Coordination Contact    Clinic Care Coordination Contact  OUTREACH    Referral Information:  Referral Source: SNF/TCU    Primary Diagnosis: Dual -  MH/CD    Chief Complaint   Patient presents with     Clinic Care Coordination - Post Hospital     Clinic Care Coordination RN         Universal Utilization:   Anoxic encephalopathy, status post cardiac arrest, resolved.  Cardiac arrest x2, during hospitalization.  Mild to moderate oropharyngeal dysphagia, improving.  Shock septic versus cardiogenic, resolved.  Community-acquired pneumonia versus aspiration pneumonia.  Alcoholic liver cirrhosis with recurrent ascites.  History of portal hypertension.  Anasarca from liver disease.  Acute kidney injury on chronic kidney disease, stage III.  Ongoing alcohol abuse/dependence.  History of antiphospholipid antibody syndrome.  History of DVT and pulmonary embolism, on anticoagulation.  Chronic systolic congestive heart failure with ejection fraction of 40 to 45%, compensated.  Coronary artery disease with history of three-vessel CAD bypass grafting.  Aortic stenosis, thoracic aortic aneurysm without rupture.  Essential hypertension.  Dyslipidemia.  Paroxysmal atrial fibrillation.  Peripheral neuropathy.  Chronic lymphedema.  Generalized weakness.  Pressure ulcer.    Clinic Utilization  Difficulty keeping appointments:: No  Compliance Concerns: No  No-Show Concerns: No  No PCP office visit in Past Year: No  Utilization    Last refreshed: 7/25/2019  2:24 PM:  Hospital Admissions 4           Last refreshed: 7/25/2019  2:24 PM:  ED Visits 4           Last refreshed: 7/25/2019  2:24 PM:  No Show Count (past year) 3              Current as of: 7/25/2019  2:24 PM              Clinical Concerns:  Current Medical Concerns:  CC met the patient and his wife face to face before PCP visit today   Current Behavioral Concerns: Not discussed          Health Maintenance Reviewed:    Clinical Pathway: None    Medication  Management:  Discussed at PCP visit today      Functional Status:  Dependent ADLs:: Wheelchair-with assist, Transfers, Positioning, Dressing, Bathing, Toileting, Ambulation-walker, Grooming, Incontinence  Dependent IADLs:: Cleaning, Cooking, Laundry, Meal Preparation, Money Management, Medication Management, Shopping, Transportation, Incontinence  Mobility Status: Dependent/Assisted by Another    Living Situation:  Current living arrangement:: I live in a private home with spouse  Type of residence:: Apartment    Diet/Exercise/Sleep:  Food Insecurity: No  Tube Feeding: No  Exercise:: Unable to exercise  Inadequate activity/exercise (GOAL):: No  Significant changes in sleep pattern (GOAL): No    Transportation:  Transportation concerns (GOAL):: No  Transportation means:: Family     Psychosocial:  Orthodoxy or spiritual beliefs that impact treatment:: Yes  Mental health DX:: Yes  Mental health DX how managed:: None  Mental health management concern (GOAL):: No  Informal Support system:: Children, Spouse     Financial/Insurance:   Financial/Insurance concerns (GOAL):: No  Community Resources: Home Care  Supplies used at home:: None  Equipment Currently Used at Home: walker, rolling, prosthesis    Advance Care Plan/Directive  Advanced Care Plans/Directives on file:: No  Type Advanced Care Plans/Directives: (see today's note)  Advanced Care Plan/Directive Status: Other (comment field)(see today's note)    Referrals Placed: Home Care     Goals:   Goals        General    #1 Functional (pt-stated)     Notes - Note created  7/26/2019  3:13 PM by Xiao Huston RN    Goal Statement: I will increase my strength by keeping future home physical therapy appointments  Measure of Success: More mobility with daily activities   Supportive Steps to Achieve: I will do home physical therapy exercises as directed   Barriers: Deconditioned   Strengths: Supportive wife   Date to Achieve By: 8/26/2019  Patient expressed understanding of  goal: Yes                Patient/Caregiver understanding: Good understanding of discharge instructions     Outreach Frequency: 2 weeks  Future Appointments              In 2 weeks Main Hardy MD [facility]           Plan:   CC will leave contact information with [facility] RN to contact when discharged from home care   Latasha Flower [facility] ADAN Huston RN, Care Coordinator   [facility] -Care Coordination  [facility] [place] [facility] [place] [facility]   [phone]

## 2019-07-26 NOTE — LETTER
[facility]  Complex Care Plan  About Me:    Patient Name:  Antonio Ramirez    YOB: 1945  Age:         74 year old   [place] MRN:    [id number] Telephone Information:  Home Phone [phone]   Mobile [phone]       Address:  [address]  [place] [address] Email address:  [email or web address]      Emergency Contact(s)    Name Relationship Lgl Grd Work Phone Home Phone Mobile Phone   1. MATT RAMIREZ Spouse No   [phone]   2. DIPIKA RAMIREZ  Daughter No   [phone]           Primary language:  English     needed? No   [place] [employer]:  [phone] op. 1  Other communication barriers: Utilization  Preferred Method of Communication:  Kumar  Current living arrangement: I live in a private home with spouse  Mobility Status/ Medical Equipment: Dependent/Assisted by Another    Health Maintenance  Health Maintenance Reviewed:    Health Maintenance Due   Topic Date Due     HF ACTION PLAN  1945     LIPID  1945     MEDICARE ANNUAL WELLNESS VISIT  03/25/2008     ZOSTER IMMUNIZATION (2 of 3) 02/26/2013       My Access Plan  Medical Emergency 911   Primary Clinic Line [facility] - [phone]   24 Hour Appointment Line [phone] or  [phone]) (toll-free)   24 Hour Nurse Line [phone] (toll-free)   Preferred Urgent Care [facility], [phone]   Preferred Hospital [facility]  [phone]   Preferred Pharmacy [id number]  [place] - [address] AT [address] [place] [address]     Behavioral Health Crisis Line The National Suicide Prevention Lifeline at 1-713.747.1586 or 911             My Care Team Members  Patient Care Team       Relationship Specialty Notifications Start End    Main Hardy MD PCP - General Internal Medicine  5/24/17     Phone: [phone] Pager: [phone] Fax: [phone] [address] [place]  [address]    Latasha Flower Home Care Nurse   2/1/18     [facility] RN Case Manager    [facility] ([facility] ([place])  12/31/18     Phone: [phone]         Oksana Najera APRN CNP Assigned PCP   3/17/19     Phone: [phone] Fax: [phone] [address] [place] [address]    Xiao Huston, RN Lead Care Coordinator Primary Care - CC Admissions 7/10/19     Phone: [phone] Fax: [phone]                My Care Plans  Self Management and Treatment Plan  Goals and (Comments)  Goals        General    #1 Functional (pt-stated)     Notes - Note created  7/26/2019  3:13 PM by Xiao Huston, RN    Goal Statement: I will increase my strength by keeping future home physical therapy appointments  Measure of Success: More mobility with daily activities   Supportive Steps to Achieve: I will do home physical therapy exercises as directed   Barriers: Deconditioned   Strengths: Supportive wife   Date to Achieve By: 8/26/2019  Patient expressed understanding of goal: Yes               Action Plans on File: None                      Advance Care Plans/Directives Type:   Type Advanced Care Plans/Directives: (see today's note)    My Medical and Care Information  Problem List   Patient Active Problem List   Diagnosis     Alcohol abuse     Alcoholic cirrhosis of liver (H)     Chronic kidney disease, stage III (moderate) (H)     Physical deconditioning     Prostate cancer -- S/P Radiation Seed implants 2000 (no problems since)     Antiphospholipid antibody syndrome (H)     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 (H)     Chronic congestive heart failure, unspecified congestive heart failure type     Thrombocytopenia -- suspect related to alcohol use     Hyperkalemia     Subdural hematoma (H)     Benign essential hypertension     Malabsorption of iron     Calculi, ureter     Anemia     CAD, S/P CABG in 2007     Aortic stenosis     Nonrheumatic mitral valve regurgitation      Nonrheumatic tricuspid valve regurgitation     Pulmonary hypertension (H)     Paroxysmal atrial fibrillation (H)     Ischemic cardiomyopathy     Aortic root dilatation (H)     Venous thrombosis     S/P total hip arthroplasty     S/P Left BKA 2017     Acute cystitis without hematuria     Traumatic hematoma of buttock     Acute blood loss anemia     CRF (chronic renal failure), stage 3 (moderate) (H)     MSSA bacteremia     H/O fall, recent     Hematoma of left lower extremity, subsequent encounter     Supratherapeutic INR     H/O deep venous thrombosis     History of pulmonary embolism     Essential hypertension     Chronic systolic congestive heart failure (H)     CKD (chronic kidney disease) stage 3, GFR 30-59 ml/min (H)     Phantom limb pain (H)     Debility     PNA (pneumonia)      Current Medications and Allergies:  See printed Medication Report.    Care Coordination Start Date: 7/26/2019   Frequency of Care Coordination: 2 weeks   Form Last Updated: 07/26/2019

## 2019-07-26 NOTE — TELEPHONE ENCOUNTER
Chief Complaint: Pneumonia, Unspecified Organism,  THU 25-JUL-2019 2 / 2    [phone] (home)

## 2019-07-26 NOTE — LETTER
[place] CARE COORDINATION  [address]  [facility] [address]    July 26, 2019    Antonio Ramirez  [address]   [facility] [address]      Dear Antonio,    I am a clinic care coordinator who works with Main Hardy MD at [facility] . I wanted to thank you for spending the time to talk with me.  I wanted to introduce myself and provide you with my contact information so that you can call me with questions or concerns about your health care. Below is a description of clinic care coordination and how I can further assist you.     The clinic care coordinator is a registered nurse and/or  who understand the health care system. The goal of clinic care coordination is to help you manage your health and improve access to the [place] system in the most efficient manner. The registered nurse can assist you in meeting your health care goals by providing education, coordinating services, and strengthening the communication among your providers. The  can assist you with financial, behavioral, psychosocial, chemical dependency, counseling, and/or psychiatric resources.    Please feel free to contact me at [phone], with any questions or concerns. We at [place] are focused on providing you with the highest-quality healthcare experience possible and that all starts with you.     Sincerely,     Xiao Huston RN, Care Coordinator   [facility] -Care Coordination  [facility] [place] [facility] [place] [facility]   [phone]     Enclosed: I have enclosed a copy of the Complex Care Plan. This has helpful information and goals that we have talked about. Please keep this in an easy to access place to use as needed.

## 2019-07-26 NOTE — TELEPHONE ENCOUNTER
ED/Discharge Protocol    Pt has already arrived for his TCU follow up (currently in with PCP)    Yoly NEW RN

## 2019-07-31 ENCOUNTER — TELEPHONE (OUTPATIENT)
Dept: FAMILY MEDICINE | Facility: CLINIC | Age: 74
End: 2019-07-31

## 2019-07-31 NOTE — TELEPHONE ENCOUNTER
OK for homecare orders - but number is not Maryam at [facility]'s voicemail.   Will await call back or Maryam may check epic.  Nevaeh Saldaña RN

## 2019-07-31 NOTE — TELEPHONE ENCOUNTER
Reason for call:  Home Healthcare Reason for Call:  Home Health Care    Maryam with [facility] called regarding (reason for call): verbal orders    Orders are needed for this patient.     PT: eval and treat    OT: eval and treat     Skilled Nursin x wk x 2 wks, 1 x wk x 2 wks and 3 prns    Pt Provider:     Phone Number Homecare Nurse can be reached at: [phone]    Can we leave a detailed message on this number? yes

## 2019-08-02 ENCOUNTER — HOSPITAL ENCOUNTER (OUTPATIENT)
Facility: CLINIC | Age: 74
Discharge: HOME | End: 2019-08-02
Admitting: INTERNAL MEDICINE
Payer: MEDICARE

## 2019-08-02 ENCOUNTER — HOSPITAL ENCOUNTER (OUTPATIENT)
Dept: ULTRASOUND IMAGING | Facility: CLINIC | Age: 74
End: 2019-08-02
Attending: INTERNAL MEDICINE
Payer: MEDICARE

## 2019-08-02 VITALS
BODY MASS INDEX: 31.44 KG/M2 | TEMPERATURE: 97.2 F | HEART RATE: 74 BPM | OXYGEN SATURATION: 97 % | DIASTOLIC BLOOD PRESSURE: 74 MMHG | RESPIRATION RATE: 18 BRPM | HEIGHT: 74 IN | WEIGHT: 245 LBS | SYSTOLIC BLOOD PRESSURE: 133 MMHG

## 2019-08-02 DIAGNOSIS — K70.31 ALCOHOLIC CIRRHOSIS OF LIVER WITH ASCITES (H): ICD-10-CM

## 2019-08-02 LAB — INR PPP: 3.64 (ref 0.86–1.14)

## 2019-08-02 PROCEDURE — 85610 PROTHROMBIN TIME: CPT

## 2019-08-02 PROCEDURE — 25000125 ZZHC RX 250: Performed by: INTERNAL MEDICINE

## 2019-08-02 PROCEDURE — 27210190 US PARACENTESIS

## 2019-08-02 PROCEDURE — 36415 COLL VENOUS BLD VENIPUNCTURE: CPT

## 2019-08-02 PROCEDURE — 40000863 ZZH STATISTIC RADIOLOGY XRAY, US, CT, MAR, NM

## 2019-08-02 RX ORDER — DEXTROSE MONOHYDRATE 25 G/50ML
25-50 INJECTION, SOLUTION INTRAVENOUS
Status: DISCONTINUED | OUTPATIENT
Start: 2019-08-02 | End: 2019-08-02 | Stop reason: HOSPADM

## 2019-08-02 RX ORDER — NICOTINE POLACRILEX 4 MG
15-30 LOZENGE BUCCAL
Status: DISCONTINUED | OUTPATIENT
Start: 2019-08-02 | End: 2019-08-02 | Stop reason: HOSPADM

## 2019-08-02 RX ORDER — LIDOCAINE HYDROCHLORIDE 10 MG/ML
10 INJECTION, SOLUTION EPIDURAL; INFILTRATION; INTRACAUDAL; PERINEURAL ONCE
Status: COMPLETED | OUTPATIENT
Start: 2019-08-02 | End: 2019-08-02

## 2019-08-02 RX ADMIN — LIDOCAINE HYDROCHLORIDE 10 ML: 10 INJECTION, SOLUTION EPIDURAL; INFILTRATION; INTRACAUDAL; PERINEURAL at 13:09

## 2019-08-02 ASSESSMENT — MIFFLIN-ST. JEOR: SCORE: 1911.06

## 2019-08-02 NOTE — PROGRESS NOTES
US guided paracentesis done   VSS thru out   Total of 4,850 ml of dark RED fluid taken off of RIGHT side   Derm-a-bond applied to site  Pt back to care suites RM 9   Wife at beside   Pt given courtesy meal and OJ  Pt without complaint

## 2019-08-02 NOTE — DISCHARGE INSTRUCTIONS

## 2019-08-02 NOTE — PROGRESS NOTES
Care Suites Admission Nursing Note    Reason for admission: paracentesis  CS arrival time: 1117  Accompanied by: wife   Name/phone of DC : Helena  Medications held: yes  Consent signed: pending  Abnormal assessment/labs: pending  If abnormal, provider notified: pending  Education/questions answered: yes  Plan: paracentesis

## 2019-08-05 ENCOUNTER — TELEPHONE (OUTPATIENT)
Dept: FAMILY MEDICINE | Facility: CLINIC | Age: 74
End: 2019-08-05

## 2019-08-05 NOTE — TELEPHONE ENCOUNTER
Reason for Call:  Medication or medication refill:    Do you use a [facility]?  Name of the pharmacy and phone number for the current request:     [id number] - [place], [place] - [address] PH[facility]AC[facility] - [address]    Name of the medication requested: FEXOFENADINE HCL PO,guaiFENesin (MUCINEX) 600 MG 12 hr tablet    Other request:  [facility] wants to know if pt is supposed to be taken these rx's ? Or have they been discontinued ?     Can we leave a detailed message on this number? YES    Phone number patient can be reached at: Other phone number:  [phone]    Best Time: any    Call taken on 8/5/2019 at 11:23 AM by Devin Ramirez

## 2019-08-05 NOTE — TELEPHONE ENCOUNTER
Reason for Call:  Home Health Care    Lucrecia with [facility] called regarding (reason for call): Orders    PT:  2 x 1, 3 x 1, 2 x 2      Phone Number Homecare Nurse can be reached at: [phone]    Can we leave a detailed message on this number? YES      Call taken on 8/5/2019 at 3:21 PM by Seble Bentley

## 2019-08-05 NOTE — TELEPHONE ENCOUNTER
"To PCP:     Fexofenadine and guaifenesin are on med list as \"\" (RX'd during TCU stay)     Ok with d/c'ing off of list?     Spoke with patient and he states he does not take these medications/does not need them.     Thank you,   Birdie PETTIT RN            "

## 2019-08-05 NOTE — TELEPHONE ENCOUNTER
Verbal approval given per request below.Homecare/hospice agency to fax orders for provider signature.     Birdie PETTIT RN

## 2019-08-12 ENCOUNTER — TELEPHONE (OUTPATIENT)
Dept: MEDSURG UNIT | Facility: CLINIC | Age: 74
End: 2019-08-12

## 2019-08-12 ENCOUNTER — OFFICE VISIT (OUTPATIENT)
Dept: FAMILY MEDICINE | Facility: CLINIC | Age: 74
End: 2019-08-12
Payer: MEDICARE

## 2019-08-12 VITALS
HEART RATE: 61 BPM | OXYGEN SATURATION: 98 % | WEIGHT: 252 LBS | BODY MASS INDEX: 32.34 KG/M2 | SYSTOLIC BLOOD PRESSURE: 120 MMHG | HEIGHT: 74 IN | TEMPERATURE: 97.1 F | DIASTOLIC BLOOD PRESSURE: 65 MMHG

## 2019-08-12 DIAGNOSIS — R19.7 DIARRHEA, UNSPECIFIED TYPE: Primary | ICD-10-CM

## 2019-08-12 DIAGNOSIS — K70.31 ALCOHOLIC CIRRHOSIS OF LIVER WITH ASCITES (H): ICD-10-CM

## 2019-08-12 PROCEDURE — 99214 OFFICE O/P EST MOD 30 MIN: CPT | Performed by: INTERNAL MEDICINE

## 2019-08-12 ASSESSMENT — MIFFLIN-ST. JEOR: SCORE: 1942.81

## 2019-08-12 NOTE — PATIENT INSTRUCTIONS
For your stool problems start over the counter imodium (loperamide).     Start with 2 tablets in the AM and take 1 additional tablet after each loose/ sludgy stool throughout the day.    Do not take more that 8 tablet in one day.    Stop imodium if your stools firm up or if you go for more than 48 hours without a bowel movement.      We need to check your stools for infection that may have resulted from prolonged antibiotic courses in the hospital (C. Diff).

## 2019-08-12 NOTE — PROGRESS NOTES
Subjective     Antonio Ramirez is a 74 year old male who presents to clinic today for the following health issues:    HPI     74-year-old man with decompensated cirrhotic liver disease from alcohol who was recently hospitalized with cardiac arrest and was treated for pneumonia with antibiotics.  Today he complains of up to 4 or 5 unpredictable, sludgy stools that sometimes resulting incontinence.  He denies abdominal pain, fevers or chills.  He had a lot of questions about his prognosis and about the goals of his care.  He wonders if there is anything that he can do to get his liver to work better?  He has been sober from alcohol for 2 months now.        Patient Active Problem List   Diagnosis     Alcohol abuse     Alcoholic cirrhosis of liver (H)     Chronic kidney disease, stage III (moderate) (H)     Physical deconditioning     Prostate cancer -- S/P Radiation Seed implants 2000 (no problems since)     Antiphospholipid antibody syndrome (H)     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 (H)     Chronic congestive heart failure, unspecified congestive heart failure type     Thrombocytopenia -- suspect related to alcohol use     Hyperkalemia     Subdural hematoma (H)     Benign essential hypertension     Malabsorption of iron     Calculi, ureter     Anemia     CAD, S/P CABG in 2007     Aortic stenosis     Nonrheumatic mitral valve regurgitation     Nonrheumatic tricuspid valve regurgitation     Pulmonary hypertension (H)     Paroxysmal atrial fibrillation (H)     Ischemic cardiomyopathy     Aortic root dilatation (H)     Venous thrombosis     S/P total hip arthroplasty     S/P Left BKA 2017     Acute cystitis without hematuria     Traumatic hematoma of buttock     Acute blood loss anemia     CRF (chronic renal failure), stage 3 (moderate) (H)     MSSA bacteremia     H/O fall, recent     Hematoma of left lower extremity, subsequent encounter     Supratherapeutic INR     H/O deep venous thrombosis     History  of pulmonary embolism     Essential hypertension     Chronic systolic congestive heart failure (H)     CKD (chronic kidney disease) stage 3, GFR 30-59 ml/min (H)     Phantom limb pain (H)     Debility     PNA (pneumonia)     Past Surgical History:   Procedure Laterality Date     AMPUTATE LEG BELOW KNEE Left 04/2014    related to gangrene, started as foot ulcer related to neuropathy     AMPUTATE LEG BELOW KNEE Left 12/4/2017    Procedure: AMPUTATE LEG BELOW KNEE;  Exploration of Left Leg Below Knee Amputation Stump with Ligation of Bleeders.;  Surgeon: Leandro Arreola MD;  Location:  OR     APPENDECTOMY       APPLY WOUND VAC Left 12/6/2017    Procedure: APPLY WOUND VAC;;  Surgeon: Saima Howard MD;  Location:  SD     ARTHROPLASTY HIP Right 11/27/2018    Procedure: RIGHT TOTAL HIP ARTHROPLASTY;  Surgeon: Saima Howard MD;  Location:  OR     ARTHROPLASTY KNEE Right 9/19/2014    Procedure: ARTHROPLASTY KNEE;  Surgeon: Saima Howard MD;  Location:  OR     CARDIAC SURGERY      CABG     CHOLECYSTECTOMY       COLONOSCOPY       COMBINED CYSTOSCOPY, RETROGRADES, EXCHANGE STENT URETER(S) Left 7/24/2018    Procedure: COMBINED CYSTOSCOPY, RETROGRADES, EXCHANGE STENT URETER(S);  CYSTOSCOPY, BILATERAL RETROGRADES, BILATERAL URETERAL STENT EXCHANGE ;  Surgeon: Matt Cervantes MD;  Location:  OR     CRANIOTOMY Right 4/7/2018    Procedure: CRANIOTOMY;  Right Craniotomy For Subdural Hematoma;  Surgeon: Jono Issa MD;  Location:  OR     CYSTOSCOPY, DILATE URETHRA, COMBINED N/A 10/12/2016    Procedure: COMBINED CYSTOSCOPY, DILATE URETHRA;  Surgeon: Dimitry Bello MD;  Location:  OR     CYSTOSCOPY, REMOVE STENT(S), COMBINED Right 7/24/2018    Procedure: COMBINED CYSTOSCOPY, REMOVE STENT(S);;  Surgeon: Jose Hunter MD;  Location:  OR     ENDOSCOPIC STRIPPING VEIN(S)       esophagogastroduodenoscopy       ESOPHAGOSCOPY, GASTROSCOPY, DUODENOSCOPY (EGD), COMBINED N/A  3/11/2019    Procedure: COMBINED ESOPHAGOSCOPY, GASTROSCOPY, DUODENOSCOPY (EGD), BIOPSY SINGLE OR MULTIPLE;  Surgeon: Katherin Boyd MD;  Location:  GI     EYE SURGERY      cataracts     GENITOURINARY SURGERY      Prostate Seen Implants     HERNIA REPAIR      Umbilical     INCISION AND DRAINAGE LOWER EXTREMITY, COMBINED Left 12/1/2017    Procedure: COMBINED INCISION AND DRAINAGE LOWER EXTREMITY;  INCISION AND DRAINAGE OF LEFT BELOW KNEE AMPUTATION ABSCESS, WOUND VAC PLACEMENT;  Surgeon: Saima Howard MD;  Location:  OR     IR IVC FILTER PLACEMENT       IRRIGATION AND DEBRIDEMENT LOWER EXTREMITY, COMBINED Left 12/6/2017    Procedure: COMBINED IRRIGATION AND DEBRIDEMENT LOWER EXTREMITY;  IRRIGATION AND DEBRIDEMENT OF LEFT BELOW KNEE AMPUTATION SITE WITH WOUND VAC REPLACEMENT;  Surgeon: Saima Howard MD;  Location:  SD     IRRIGATION AND DEBRIDEMENT LOWER EXTREMITY, COMBINED Left 12/8/2017    Procedure: COMBINED IRRIGATION AND DEBRIDEMENT LOWER EXTREMITY;  IRRIGATION AND DEBRIDEMENT OF LEFT BELOW THE KNEE AMPUTATION AND SHORTENING OF THE TIBIA WITH WOUND CLOSURE;  Surgeon: Saima Howard MD;  Location:  OR     LASER HOLMIUM LITHOTRIPSY URETER(S), INSERT STENT, COMBINED Bilateral 6/28/2018    Procedure: COMBINED CYSTOSCOPY, URETEROSCOPY, LASER HOLMIUM LITHOTRIPSY URETER(S), INSERT STENT;  CYSTOSCOPY, BILATERAL URETEROSCOPY,  HOLMIUM LASER LITHOTRIPSY, BILATERAL STENT PLACEMENT, STONE BASKETING;  Surgeon: Jose Hunter MD;  Location:  OR     LASER HOLMIUM LITHOTRIPSY URETER(S), INSERT STENT, COMBINED Left 8/14/2018    Procedure: COMBINED CYSTOSCOPY, URETEROSCOPY, LASER HOLMIUM LITHOTRIPSY URETER(S), INSERT STENT;  CYSTOSCOPY, LEFT URETEROSCOPY, LEFT LASER HOLMIUM LITHOTRIPSY URETER(S) REMOVAL BILATERAL STENTS;  Surgeon: Jose Hunter MD;  Location:  OR     ORTHOPEDIC SURGERY      (R) knee scope     ORTHOPEDIC SURGERY      total hip      ORTHOPEDIC SURGERY      elbow  "surgery       Social History     Tobacco Use     Smoking status: Never Smoker     Smokeless tobacco: Never Used   Substance Use Topics     Alcohol use: Yes     Comment: quit 10/2015; now 3-4 Vodkas/night, 2019 abt 1-2 vodka's per night     Family History   Problem Relation Age of Onset     Lung Cancer Mother      Heart Failure Father          age 87         Current Outpatient Medications   Medication Sig Dispense Refill     gabapentin (NEURONTIN) 300 MG capsule Take 1 capsule (300 mg) by mouth 2 times daily (Patient taking differently: Take 600 mg by mouth 2 times daily )       multivitamin, therapeutic (THERA-VIT) TABS tablet Take 1 tablet by mouth daily       rivaroxaban ANTICOAGULANT (XARELTO) 15 MG TABS tablet Take 1 tablet (15 mg) by mouth daily (with dinner)       torsemide (DEMADEX) 20 MG tablet Take 20 mg by mouth 2 times daily        vitamin B1 (THIAMINE) 100 MG tablet Take 1 tablet (100 mg) by mouth daily       Allergies   Allergen Reactions     Ciprofloxacin Itching     Severe itching \"like ants were crawling\"     Hmg-Coa-R Inhibitors Other (See Comments)     Rhabdomyolysis occurred within a couple days       Reviewed and updated as needed this visit by Provider         Review of Systems   ROS COMP: Constitutional, HEENT, cardiovascular, pulmonary, gi and gu systems are negative, except as otherwise noted.      Objective    /65 (BP Location: Right arm, Cuff Size: Adult Regular)   Pulse 61   Temp 97.1  F (36.2  C) (Tympanic)   Ht 1.88 m (6' 2\")   Wt 114.3 kg (252 lb)   SpO2 98%   BMI 32.35 kg/m    Body mass index is 32.35 kg/m .  Physical Exam   General: This is a chronically ill-appearing man who speaks in full sentences and does not appear toxic.  He appears quite comfortable.  His abdomen is less distended than last visit, soft and not tender.  Bowel sounds are present    Diagnostic Test Results:  Stool tests are pending        Assessment & Plan       ICD-10-CM    1. Diarrhea, " unspecified type R19.7 Clostridium difficile Toxin B PCR     Enteric Bacteria and Virus Panel by TIFFANIE Stool     Ova and Parasite Exam Routine   2. Alcoholic cirrhosis of liver with ascites (H) K70.31        Clinical suspicion for C. difficile colitis given exposure to antibiotics.  Check C. difficile toxin.  Discussed treatment with oral vancomycin if C. difficile toxin positive.  Discussed symptom management strategies for loose stools.  Long discussion regarding prognosis which is guarded.  Discussion regarding palliative care or hospice care.  Patient and wife not ready to make those decisions yet today.    Patient Instructions   For your stool problems start over the counter imodium (loperamide).     Start with 2 tablets in the AM and take 1 additional tablet after each loose/ sludgy stool throughout the day.    Do not take more that 8 tablet in one day.    Stop imodium if your stools firm up or if you go for more than 48 hours without a bowel movement.      We need to check your stools for infection that may have resulted from prolonged antibiotic courses in the hospital (C. Diff).          Return in about 1 month (around 9/12/2019) for liver disease recheck .    Main Hardy MD  [facility]    Total face to face contact time was greater than 27 minutes of which more than 50% of this time was spent counseling and coordinating care regarding the above topics.

## 2019-08-13 ENCOUNTER — HOSPITAL ENCOUNTER (OUTPATIENT)
Dept: ULTRASOUND IMAGING | Facility: CLINIC | Age: 74
End: 2019-08-13
Attending: INTERNAL MEDICINE | Admitting: RADIOLOGY
Payer: MEDICARE

## 2019-08-13 ENCOUNTER — HOSPITAL ENCOUNTER (OUTPATIENT)
Facility: CLINIC | Age: 74
Discharge: HOME | End: 2019-08-13
Attending: RADIOLOGY | Admitting: RADIOLOGY
Payer: MEDICARE

## 2019-08-13 VITALS
OXYGEN SATURATION: 96 % | RESPIRATION RATE: 16 BRPM | DIASTOLIC BLOOD PRESSURE: 47 MMHG | TEMPERATURE: 97.7 F | BODY MASS INDEX: 32.08 KG/M2 | SYSTOLIC BLOOD PRESSURE: 121 MMHG | WEIGHT: 250 LBS | HEART RATE: 59 BPM | HEIGHT: 74 IN

## 2019-08-13 DIAGNOSIS — K70.31 ALCOHOLIC CIRRHOSIS OF LIVER WITH ASCITES (H): ICD-10-CM

## 2019-08-13 PROCEDURE — 40000863 ZZH STATISTIC RADIOLOGY XRAY, US, CT, MAR, NM

## 2019-08-13 PROCEDURE — 25000125 ZZHC RX 250: Performed by: INTERNAL MEDICINE

## 2019-08-13 PROCEDURE — 27210190 US PARACENTESIS

## 2019-08-13 RX ORDER — NICOTINE POLACRILEX 4 MG
15-30 LOZENGE BUCCAL
Status: DISCONTINUED | OUTPATIENT
Start: 2019-08-13 | End: 2019-08-13 | Stop reason: HOSPADM

## 2019-08-13 RX ORDER — LOPERAMIDE HCL 2 MG
4 CAPSULE ORAL 4 TIMES DAILY PRN
COMMUNITY
End: 2019-09-11

## 2019-08-13 RX ORDER — LIDOCAINE 40 MG/G
CREAM TOPICAL
Status: DISCONTINUED | OUTPATIENT
Start: 2019-08-13 | End: 2019-08-13 | Stop reason: HOSPADM

## 2019-08-13 RX ORDER — DEXTROSE MONOHYDRATE 25 G/50ML
25-50 INJECTION, SOLUTION INTRAVENOUS
Status: DISCONTINUED | OUTPATIENT
Start: 2019-08-13 | End: 2019-08-13 | Stop reason: HOSPADM

## 2019-08-13 RX ORDER — ALBUMIN (HUMAN) 12.5 G/50ML
12.5 SOLUTION INTRAVENOUS ONCE
Status: DISCONTINUED | OUTPATIENT
Start: 2019-08-13 | End: 2019-08-13 | Stop reason: HOSPADM

## 2019-08-13 RX ORDER — LIDOCAINE HYDROCHLORIDE 10 MG/ML
10 INJECTION, SOLUTION EPIDURAL; INFILTRATION; INTRACAUDAL; PERINEURAL ONCE
Status: COMPLETED | OUTPATIENT
Start: 2019-08-13 | End: 2019-08-13

## 2019-08-13 RX ADMIN — LIDOCAINE HYDROCHLORIDE 10 ML: 10 INJECTION, SOLUTION EPIDURAL; INFILTRATION; INTRACAUDAL; PERINEURAL at 10:34

## 2019-08-13 ASSESSMENT — MIFFLIN-ST. JEOR: SCORE: 1933.74

## 2019-08-13 NOTE — DISCHARGE INSTRUCTIONS

## 2019-08-13 NOTE — PROGRESS NOTES
Care Suites Procedure Nursing Note    Procedure: US paracentesis Drained 5850 blood tinge fluid from right abd.  Procedure started time: 1035  Procedure completed time: 1055  Concerns/abnormal assessment: NA  Plan: Back to care suites.

## 2019-08-13 NOTE — PROGRESS NOTES
Care Suites Discharge Nursing Note    Education/questions answered: Yes  Patient DC location: home  Accompanied by: Spouse  CS discharge time: 0586

## 2019-08-13 NOTE — PROGRESS NOTES
Care Suites Admission Nursing Note    Reason for admission:Paracentesis  CS arrival time: ~0920  Accompanied by: spouse   Name/phone of DC :spouse [phone]  Medications held: Farncisto  Consent signed: Pending   Abnormal assessment/labs: Yes   If abnormal, provider notified: MD aware from last procedure and was ok.    Education/questions answered: Discharge instructions reviewed and questions answered  Plan: Paracentesis ~1030

## 2019-08-13 NOTE — PROGRESS NOTES
Care Suites Post-Procedure Note    Procedure:Paracentesis  CS arrival time: 1100  Accompanied by: Johana ROBINS  Concerns/abnormal assessment after procedure: No.  Right abdomen site CDI.  No hematoma.  No c/o.   Plan: Discharge home

## 2019-08-13 NOTE — PROGRESS NOTES
RADIOLOGY PROCEDURE NOTE  Patient name: Antonio Ramirez  MRN: [id number]  : 1945    Pre-procedure diagnosis: Ascites  Post-procedure diagnosis: Same    Procedure Date/Time: 2019  10:45 AM  Procedure: Paracentesis  Estimated blood loss: None  Specimen(s) collected with description: Ascites.  The patient tolerated the procedure well with no immediate complications.    See imaging dictation for procedural details and findings.    Provider name: Juarez Cardona  Assistant(s):None

## 2019-08-14 ENCOUNTER — HOSPITAL ENCOUNTER (OUTPATIENT)
Facility: CLINIC | Age: 74
End: 2019-08-14
Payer: MEDICARE

## 2019-08-19 ENCOUNTER — PATIENT OUTREACH (OUTPATIENT)
Dept: CARE COORDINATION | Facility: CLINIC | Age: 74
End: 2019-08-19

## 2019-08-19 DIAGNOSIS — K70.30 ALCOHOLIC CIRRHOSIS OF LIVER (H): Primary | ICD-10-CM

## 2019-08-19 NOTE — PROGRESS NOTES
Clinic Care Coordination Contact    Clinic Care Coordination Contact  OUTREACH    Referral Information:  Referral Source: SNF/TCU    Primary Diagnosis: GI Disorders    Chief Complaint   Patient presents with     Clinic Care Coordination - Post Hospital     Clinic Care Coordination RN         Universal Utilization: 8/8/2019 Paracentesis   Clinic Utilization  Difficulty keeping appointments:: No  Compliance Concerns: No  No-Show Concerns: No  No PCP office visit in Past Year: No  Utilization    Last refreshed: 8/19/2019 12:19 PM:  Hospital Admissions 3           Last refreshed: 8/19/2019 12:19 PM:  ED Visits 4           Last refreshed: 8/19/2019 12:19 PM:  No Show Count (past year) 3              Current as of: 8/19/2019 12:19 PM              Clinical Concerns:  Current Medical Concerns:  Patient reports 8/8 paracentesis constantino and his SOB episodes have resolved   Patient reports he continues home care services for another month.  Patient states his strength has improved since he has had PT in the home   Health Maintenance Reviewed:    Clinical Pathway: None    Medication Management:  Not discussed today  Managed by home care RN     Functional Status:  Dependent ADLs:: Wheelchair-with assist, Transfers, Positioning, Dressing, Bathing, Toileting, Ambulation-walker, Grooming, Incontinence  Dependent IADLs:: Cleaning, Cooking, Laundry, Meal Preparation, Money Management, Medication Management, Shopping, Transportation, Incontinence  Mobility Status: Dependent/Assisted by Another    Living Situation:  Current living arrangement:: I live in a private home with spouse  Type of residence:: Apartment    Diet/Exercise/Sleep:  Food Insecurity: No  Tube Feeding: No  Exercise:: Unable to exercise  Inadequate activity/exercise (GOAL):: No  Significant changes in sleep pattern (GOAL): No    Transportation:  Transportation concerns (GOAL):: No  Transportation means:: Family     Psychosocial:  Shinto or spiritual beliefs that impact  treatment:: Yes  Mental health DX:: Yes  Mental health DX how managed:: None  Mental health management concern (GOAL):: No  Informal Support system:: Children, Spouse     Financial/Insurance:   Financial/Insurance concerns (GOAL):: No  Community Resources: Home Care  Supplies used at home:: None  Equipment Currently Used at Home: walker, rolling, prosthesis    Advance Care Plan/Directive  Advanced Care Plans/Directives on file:: No  Type Advanced Care Plans/Directives: (see today's note)  Advanced Care Plan/Directive Status: Other (comment field)(see today's note)    Referrals Placed: Home Care     Goals:   Goals        General    #1 Functional (pt-stated)     Notes - Note edited  8/19/2019  1:20 PM by Xiao Huston RN    Goal Statement: I will increase my strength by keeping future home physical therapy appointments  Measure of Success: More mobility with daily activities   Supportive Steps to Achieve: I will do home physical therapy exercises as directed   Barriers: Deconditioned   Strengths: Supportive wife   Date to Achieve By: 9/19/2019  Patient expressed understanding of goal: Yes                Outreach Frequency: 2 weeks  Future Appointments              In 1 week SH BODY RAD; SH BODY RAD; SH IMAGING NURSE; SHUS[facility] [place] [facility]    In 1 week Main Hardy MD [facility]    In 3 weeks SH BODY RAD; SH BODY RAD; SH IMAGING NURSE; SHUS4 [facility] [place] [facility]    In 1 month [id number] [facility], [facility]    In 1 month SH BODY RAD; SH BODY RAD; SH IMAGING NURSE; SHUS4 [facility], [place] [facility]    In 1 month Elena Downs MD [facility]    In 1 month SH BODY RAD; SH BODY RAD; SH IMAGING NURSE; SHUS4 [facility], [place] [facility]    In 2 months SH BODY RAD; SH BODY RAD; SH IMAGING NURSE; SHUS4 [facility], [place] [facility]     In 2 months SH BODY RAD; SH BODY RAD; SH IMAGING NURSE; SH[facility] [place] [facility]    In 3 months SH BODY RAD; SH BODY RAD; SH IMAGING NURSE; SHUS4 [facility], [place] A[facility]          Plan:   Patient will continue home care services for another month   CC will follow up when discharged from [facility]   Xiao Huston RN, Care Coordinator   [facility] -Care Coordination  [facility]   [phone]

## 2019-08-20 ENCOUNTER — TELEPHONE (OUTPATIENT)
Dept: MEDSURG UNIT | Facility: CLINIC | Age: 74
End: 2019-08-20

## 2019-08-27 ENCOUNTER — HOSPITAL ENCOUNTER (OUTPATIENT)
Facility: CLINIC | Age: 74
Discharge: HOME | End: 2019-08-27
Admitting: RADIOLOGY
Payer: MEDICARE

## 2019-08-27 ENCOUNTER — HOSPITAL ENCOUNTER (OUTPATIENT)
Dept: ULTRASOUND IMAGING | Facility: CLINIC | Age: 74
End: 2019-08-27
Attending: INTERNAL MEDICINE
Payer: MEDICARE

## 2019-08-27 VITALS
SYSTOLIC BLOOD PRESSURE: 108 MMHG | HEART RATE: 63 BPM | OXYGEN SATURATION: 96 % | DIASTOLIC BLOOD PRESSURE: 68 MMHG | RESPIRATION RATE: 14 BRPM | TEMPERATURE: 97.9 F

## 2019-08-27 DIAGNOSIS — K70.31 ALCOHOLIC CIRRHOSIS OF LIVER WITH ASCITES (H): ICD-10-CM

## 2019-08-27 LAB
INR PPP: 1.65 (ref 0.86–1.14)
PLATELET # BLD AUTO: 185 10E9/L (ref 150–450)

## 2019-08-27 PROCEDURE — 85610 PROTHROMBIN TIME: CPT | Performed by: RADIOLOGY

## 2019-08-27 PROCEDURE — 36415 COLL VENOUS BLD VENIPUNCTURE: CPT | Performed by: RADIOLOGY

## 2019-08-27 PROCEDURE — 40000863 ZZH STATISTIC RADIOLOGY XRAY, US, CT, MAR, NM

## 2019-08-27 PROCEDURE — 25000125 ZZHC RX 250: Performed by: RADIOLOGY

## 2019-08-27 PROCEDURE — 27210190 US PARACENTESIS

## 2019-08-27 PROCEDURE — 85049 AUTOMATED PLATELET COUNT: CPT | Performed by: RADIOLOGY

## 2019-08-27 RX ORDER — NICOTINE POLACRILEX 4 MG
15-30 LOZENGE BUCCAL
Status: CANCELLED | OUTPATIENT
Start: 2019-08-27

## 2019-08-27 RX ORDER — ALBUMIN (HUMAN) 12.5 G/50ML
12.5 SOLUTION INTRAVENOUS ONCE
Status: CANCELLED | OUTPATIENT
Start: 2019-08-27 | End: 2019-08-27

## 2019-08-27 RX ORDER — NICOTINE POLACRILEX 4 MG
15-30 LOZENGE BUCCAL
Status: DISCONTINUED | OUTPATIENT
Start: 2019-08-27 | End: 2019-08-27 | Stop reason: HOSPADM

## 2019-08-27 RX ORDER — DEXTROSE MONOHYDRATE 25 G/50ML
25-50 INJECTION, SOLUTION INTRAVENOUS
Status: CANCELLED | OUTPATIENT
Start: 2019-08-27

## 2019-08-27 RX ORDER — LIDOCAINE 40 MG/G
CREAM TOPICAL
Status: DISCONTINUED | OUTPATIENT
Start: 2019-08-27 | End: 2019-08-27 | Stop reason: HOSPADM

## 2019-08-27 RX ORDER — LIDOCAINE HYDROCHLORIDE 10 MG/ML
10 INJECTION, SOLUTION EPIDURAL; INFILTRATION; INTRACAUDAL; PERINEURAL ONCE
Status: COMPLETED | OUTPATIENT
Start: 2019-08-27 | End: 2019-08-27

## 2019-08-27 RX ORDER — DEXTROSE MONOHYDRATE 25 G/50ML
25-50 INJECTION, SOLUTION INTRAVENOUS
Status: DISCONTINUED | OUTPATIENT
Start: 2019-08-27 | End: 2019-08-27 | Stop reason: HOSPADM

## 2019-08-27 RX ORDER — ALBUMIN (HUMAN) 12.5 G/50ML
12.5 SOLUTION INTRAVENOUS ONCE
Status: DISCONTINUED | OUTPATIENT
Start: 2019-08-27 | End: 2019-08-27 | Stop reason: HOSPADM

## 2019-08-27 RX ADMIN — LIDOCAINE HYDROCHLORIDE 10 ML: 10 INJECTION, SOLUTION EPIDURAL; INFILTRATION; INTRACAUDAL; PERINEURAL at 14:09

## 2019-08-27 RX ADMIN — LIDOCAINE HYDROCHLORIDE 10 ML: 10 INJECTION, SOLUTION EPIDURAL; INFILTRATION; INTRACAUDAL; PERINEURAL at 15:09

## 2019-08-27 NOTE — PROGRESS NOTES
Care Suites Admission Nursing Note    Reason for admission: paracentesis  CS arrival time:1240  Accompanied by: wife  Name/phone of DC : Helena [phone]  Medications held: Xarelto  Consent signed: PRETTY  Abnormal assessment/labs: no  If abnormal, provider notified: n/a  Education/questions answered: pre and post procedure instructions reviewed with pt, questions answered and pt states understanding  Plan: TBD

## 2019-08-27 NOTE — PROGRESS NOTES
Care Suites Procedure Nursing Note    Procedure: paracentesis  Procedure started time: 1420  Timeout done.  Procedure completed time: 1525  Concerns/abnormal assessment: provider had to place three catheters.  6400cc aspirated.  [facility]  ___________________________    Care Suites Post-Procedure Note    Procedure: paracentesis  CS arrival time: 1535  Accompanied by: myself  Concerns/abnormal assessment after procedure: none.  Bilateral abdominal dressing sites CDI.  Plan: Discharge when patient is ready.  ___________________________    Care Suites Discharge Nursing Note    Patient taking po fluids.  Declined food.  Education/questions answered: yes  Patient DC location: to home  Accompanied by: wife/  CS discharge time: 1600         The patient is a 8y year old Male complaining of sore throat.

## 2019-08-27 NOTE — DISCHARGE INSTRUCTIONS

## 2019-08-29 ENCOUNTER — TELEPHONE (OUTPATIENT)
Dept: FAMILY MEDICINE | Facility: CLINIC | Age: 74
End: 2019-08-29

## 2019-08-29 NOTE — TELEPHONE ENCOUNTER
Reason for Call:  Home Health Care    Kelli with [facility] called regarding (reason for call):     Orders are needed for this patient.     PT: Continue PT 1x a week for 4 weeks      - increase of pain under right axilla to the hip with transfers     Pt Provider: Dr. Hardy    Phone Number Homecare Nurse can be reached at: [phone]    Can we leave a detailed message on this number? YES        Call taken on 8/29/2019 at 11:57 AM by Maria G Hardin

## 2019-08-29 NOTE — TELEPHONE ENCOUNTER
Called Eneida with [facility] to provide verbal orders for PT as requested    Ruy GIBSON RN

## 2019-08-30 ENCOUNTER — ANCILLARY PROCEDURE (OUTPATIENT)
Dept: GENERAL RADIOLOGY | Facility: CLINIC | Age: 74
End: 2019-08-30
Attending: INTERNAL MEDICINE
Payer: MEDICARE

## 2019-08-30 ENCOUNTER — OFFICE VISIT (OUTPATIENT)
Dept: FAMILY MEDICINE | Facility: CLINIC | Age: 74
End: 2019-08-30
Payer: MEDICARE

## 2019-08-30 VITALS
WEIGHT: 244.4 LBS | HEART RATE: 67 BPM | HEIGHT: 74 IN | DIASTOLIC BLOOD PRESSURE: 56 MMHG | BODY MASS INDEX: 31.37 KG/M2 | SYSTOLIC BLOOD PRESSURE: 99 MMHG | OXYGEN SATURATION: 98 % | TEMPERATURE: 97.4 F

## 2019-08-30 DIAGNOSIS — K70.31 ALCOHOLIC CIRRHOSIS OF LIVER WITH ASCITES (H): ICD-10-CM

## 2019-08-30 DIAGNOSIS — J18.9 PNEUMONIA DUE TO INFECTIOUS ORGANISM, UNSPECIFIED LATERALITY, UNSPECIFIED PART OF LUNG: Primary | ICD-10-CM

## 2019-08-30 DIAGNOSIS — J18.9 PNEUMONIA DUE TO INFECTIOUS ORGANISM, UNSPECIFIED LATERALITY, UNSPECIFIED PART OF LUNG: ICD-10-CM

## 2019-08-30 PROCEDURE — 71046 X-RAY EXAM CHEST 2 VIEWS: CPT

## 2019-08-30 PROCEDURE — 99214 OFFICE O/P EST MOD 30 MIN: CPT | Performed by: INTERNAL MEDICINE

## 2019-08-30 ASSESSMENT — MIFFLIN-ST. JEOR: SCORE: 1908.34

## 2019-08-30 NOTE — PROGRESS NOTES
Subjective     Antonio Ramirez is a 74 year old male who presents to clinic today for the following health issues:    HPI   F/up pneumonia  F/up liver disease    74-year-old with cirrhosis from alcohol returns in follow-up after being hospitalized for cardiac arrest and pneumonia.  He was to have a follow-up chest x-ray to follow-up on consolidative opacities in the right lower base for which she was treated with a course of antibiotics.  The diarrhea that he described at his last visit has resolved completely.  He admits to drinking 2 shots of vodka last night.  He attributes this to familial stressors.  He is a little bit evasive about stating whether he intends to continue drinking alcohol.  He is now getting paracenteses about every 14 days to manage ascites symptoms.    Patient Active Problem List   Diagnosis     Alcohol abuse     Alcoholic cirrhosis of liver (H)     Chronic kidney disease, stage III (moderate) (H)     Physical deconditioning     Prostate cancer -- S/P [employer] implants 2000 (no problems since)     Antiphospholipid antibody syndrome (H)     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 (H)     Chronic congestive heart failure, unspecified congestive heart failure type     Thrombocytopenia -- suspect related to alcohol use     Hyperkalemia     Subdural hematoma (H)     Benign essential hypertension     Malabsorption of iron     Calculi, ureter     Anemia     CAD, S/P CABG in 2007     Aortic stenosis     Nonrheumatic mitral valve regurgitation     Nonrheumatic tricuspid valve regurgitation     Pulmonary hypertension (H)     Paroxysmal atrial fibrillation (H)     Ischemic cardiomyopathy     Aortic root dilatation (H)     Venous thrombosis     S/P total hip arthroplasty     S/P Left BKA 2017     Acute cystitis without hematuria     Traumatic hematoma of buttock     Acute blood loss anemia     CRF (chronic renal failure), stage 3 (moderate) (H)     MSSA bacteremia     H/O fall, recent      Hematoma of left lower extremity, subsequent encounter     Supratherapeutic INR     H/O deep venous thrombosis     History of pulmonary embolism     Essential hypertension     Chronic systolic congestive heart failure (H)     CKD (chronic kidney disease) stage 3, GFR 30-59 ml/min (H)     Phantom limb pain (H)     Debility     PNA (pneumonia)     Past Surgical History:   Procedure Laterality Date     AMPUTATE LEG BELOW KNEE Left 04/2014    related to gangrene, started as foot ulcer related to neuropathy     AMPUTATE LEG BELOW KNEE Left 12/4/2017    Procedure: AMPUTATE LEG BELOW KNEE;  Exploration of Left Leg Below Knee Amputation Stump with Ligation of Bleeders.;  Surgeon: Leandro Arreola MD;  Location:  OR     APPENDECTOMY       APPLY WOUND VAC Left 12/6/2017    Procedure: APPLY WOUND VAC;;  Surgeon: Saima Howard MD;  Location:  SD     ARTHROPLASTY HIP Right 11/27/2018    Procedure: RIGHT TOTAL HIP ARTHROPLASTY;  Surgeon: Saima Howard MD;  Location:  OR     ARTHROPLASTY KNEE Right 9/19/2014    Procedure: ARTHROPLASTY KNEE;  Surgeon: Saima Howard MD;  Location:  OR     CARDIAC SURGERY      CABG     CHOLECYSTECTOMY       COLONOSCOPY       COMBINED CYSTOSCOPY, RETROGRADES, EXCHANGE STENT URETER(S) Left 7/24/2018    Procedure: COMBINED CYSTOSCOPY, RETROGRADES, EXCHANGE STENT URETER(S);  CYSTOSCOPY, BILATERAL RETROGRADES, BILATERAL URETERAL STENT EXCHANGE ;  Surgeon: Matt Cervantes MD;  Location:  OR     CRANIOTOMY Right 4/7/2018    Procedure: CRANIOTOMY;  Right Craniotomy For Subdural Hematoma;  Surgeon: Jono Issa MD;  Location:  OR     CYSTOSCOPY, DILATE URETHRA, COMBINED N/A 10/12/2016    Procedure: COMBINED CYSTOSCOPY, DILATE URETHRA;  Surgeon: Dimitry Bello MD;  Location:  OR     CYSTOSCOPY, REMOVE STENT(S), COMBINED Right 7/24/2018    Procedure: COMBINED CYSTOSCOPY, REMOVE STENT(S);;  Surgeon: Jose Hunter MD;  Location:  OR      ENDOSCOPIC STRIPPING VEIN(S)       esophagogastroduodenoscopy       ESOPHAGOSCOPY, GASTROSCOPY, DUODENOSCOPY (EGD), COMBINED N/A 3/11/2019    Procedure: COMBINED ESOPHAGOSCOPY, GASTROSCOPY, DUODENOSCOPY (EGD), BIOPSY SINGLE OR MULTIPLE;  Surgeon: Katherin Boyd MD;  Location:  GI     EYE SURGERY      cataracts     GENITOURINARY SURGERY      Prostate Seen Implants     HERNIA REPAIR      Umbilical     INCISION AND DRAINAGE LOWER EXTREMITY, COMBINED Left 12/1/2017    Procedure: COMBINED INCISION AND DRAINAGE LOWER EXTREMITY;  INCISION AND DRAINAGE OF LEFT BELOW KNEE AMPUTATION ABSCESS, WOUND VAC PLACEMENT;  Surgeon: Saima Howard MD;  Location:  OR     IR IVC FILTER PLACEMENT       IRRIGATION AND DEBRIDEMENT LOWER EXTREMITY, COMBINED Left 12/6/2017    Procedure: COMBINED IRRIGATION AND DEBRIDEMENT LOWER EXTREMITY;  IRRIGATION AND DEBRIDEMENT OF LEFT BELOW KNEE AMPUTATION SITE WITH WOUND VAC REPLACEMENT;  Surgeon: Saima Howard MD;  Location:  SD     IRRIGATION AND DEBRIDEMENT LOWER EXTREMITY, COMBINED Left 12/8/2017    Procedure: COMBINED IRRIGATION AND DEBRIDEMENT LOWER EXTREMITY;  IRRIGATION AND DEBRIDEMENT OF LEFT BELOW THE KNEE AMPUTATION AND SHORTENING OF THE TIBIA WITH WOUND CLOSURE;  Surgeon: Saima Howard MD;  Location:  OR     LASER HOLMIUM LITHOTRIPSY URETER(S), INSERT STENT, COMBINED Bilateral 6/28/2018    Procedure: COMBINED CYSTOSCOPY, URETEROSCOPY, LASER HOLMIUM LITHOTRIPSY URETER(S), INSERT STENT;  CYSTOSCOPY, BILATERAL URETEROSCOPY,  HOLMIUM LASER LITHOTRIPSY, BILATERAL STENT PLACEMENT, STONE BASKETING;  Surgeon: Jose Hunter MD;  Location:  OR     LASER HOLMIUM LITHOTRIPSY URETER(S), INSERT STENT, COMBINED Left 8/14/2018    Procedure: COMBINED CYSTOSCOPY, URETEROSCOPY, LASER HOLMIUM LITHOTRIPSY URETER(S), INSERT STENT;  CYSTOSCOPY, LEFT URETEROSCOPY, LEFT LASER HOLMIUM LITHOTRIPSY URETER(S) REMOVAL BILATERAL STENTS;  Surgeon: Jose Hunter MD;  Location:  "[facility] OR     ORTHOPEDIC SURGERY      (R) knee scope     ORTHOPEDIC SURGERY      total hip      ORTHOPEDIC SURGERY      elbow surgery       Social History     Tobacco Use     Smoking status: Never Smoker     Smokeless tobacco: Never Used   Substance Use Topics     Alcohol use: Yes     Comment: quit 10/2015; now abt 1-2 vodka's per night     Family History   Problem Relation Age of Onset     Lung Cancer Mother      Heart Failure Father          age 87         Current Outpatient Medications   Medication Sig Dispense Refill     gabapentin (NEURONTIN) 300 MG capsule Take 1 capsule (300 mg) by mouth 2 times daily (Patient taking differently: Take 600 mg by mouth 2 times daily )       loperamide (IMODIUM) 2 MG capsule Take 4 mg by mouth 4 times daily as needed for diarrhea       multivitamin, therapeutic (THERA-VIT) TABS tablet Take 1 tablet by mouth daily       rivaroxaban ANTICOAGULANT (XARELTO) 15 MG TABS tablet Take 1 tablet (15 mg) by mouth daily (with dinner)       torsemide (DEMADEX) 20 MG tablet Take 20 mg by mouth 2 times daily        vitamin B1 (THIAMINE) 100 MG tablet Take 1 tablet (100 mg) by mouth daily       Allergies   Allergen Reactions     Ciprofloxacin Itching     Severe itching \"like ants were crawling\"     Hmg-Coa-R Inhibitors Other (See Comments)     Rhabdomyolysis occurred within a couple days         Reviewed and updated as needed this visit by Provider         Review of Systems   ROS COMP: Constitutional, HEENT, cardiovascular, pulmonary, gi and gu systems are negative, except as otherwise noted.      Objective    BP 99/56 (BP Location: Right arm, Cuff Size: Adult Regular)   Pulse 67   Temp 97.4  F (36.3  C) (Oral)   Ht 1.88 m (6' 2\")   Wt 110.9 kg (244 lb 6.4 oz)   SpO2 98%   BMI 31.38 kg/m    Body mass index is 31.38 kg/m .  Physical Exam   General: This is a chronically ill-appearing man sitting in a wheelchair.  Pulmonary: There are decreased breath sounds at the right lung base the " remainder of the lung exam is clear to auscultation and breathing is not appear to be labored.  Abdomen: Less distended than last visit, bowel sounds present, soft, not tender.  Edema in his leg is improved from previous visits.            Assessment & Plan       ICD-10-CM    1. Pneumonia due to infectious organism, unspecified laterality, unspecified part of lung J18.9 XR Chest 2 Views   2. Alcoholic cirrhosis of liver with ascites (H) K70.31         We will recheck an x-ray.  I suspect that his right lower lung findings might be related to ascites fluid tracking into the right lower pleural space.  I had a mehran conversation with him today.  He is dying from cirrhosis from alcohol.  He has made a choice to return to drinking alcohol.  His prognosis is very guarded.  He is a candidate for hospice care.  He declined a referral to hospice.  I recommended a consultation with palliative care to help delineate goals of care and to help with symptom management.  He declined my recommendation for palliative care consult.  If there is ascites fluid tracking into the pleural space, we could consider increasing the dose of his torsemide as he is only taking torsemide once daily according to his wife who has told me that he is now responsible for taking his own medications.  Another option is to increase the frequency of paracentesis procedures to once weekly.  I spent about 30 minutes with this patient and his wife in face-to-face contact more than 50% of that time spent counseling and coordinating care.            Return in about 1 month (around 9/30/2019) for Pain Check.  Return sooner if needed    Main Hardy MD  [facility]

## 2019-09-01 ENCOUNTER — HOSPITAL ENCOUNTER (EMERGENCY)
Facility: CLINIC | Age: 74
Discharge: HOME | End: 2019-09-01
Attending: EMERGENCY MEDICINE | Admitting: EMERGENCY MEDICINE
Payer: MEDICARE

## 2019-09-01 ENCOUNTER — APPOINTMENT (OUTPATIENT)
Dept: CT IMAGING | Facility: CLINIC | Age: 74
End: 2019-09-01
Attending: EMERGENCY MEDICINE
Payer: MEDICARE

## 2019-09-01 VITALS
HEIGHT: 74 IN | TEMPERATURE: 98.8 F | HEART RATE: 85 BPM | BODY MASS INDEX: 31.32 KG/M2 | SYSTOLIC BLOOD PRESSURE: 120 MMHG | OXYGEN SATURATION: 98 % | RESPIRATION RATE: 20 BRPM | DIASTOLIC BLOOD PRESSURE: 80 MMHG | WEIGHT: 244 LBS

## 2019-09-01 DIAGNOSIS — S01.01XA SCALP LACERATION, INITIAL ENCOUNTER: ICD-10-CM
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
## 2019-09-01 PROCEDURE — 12002 RPR S/N/AX/GEN/TRNK2.6-7.5CM: CPT

## 2019-09-01 PROCEDURE — 70450 CT HEAD/BRAIN W/O DYE: CPT

## 2019-09-01 PROCEDURE — 25000128 H RX IP 250 OP 636: Performed by: EMERGENCY MEDICINE

## 2019-09-01 PROCEDURE — 25000125 ZZHC RX 250: Performed by: EMERGENCY MEDICINE

## 2019-09-01 PROCEDURE — 99284 EMERGENCY DEPT VISIT MOD MDM: CPT | Mod: 25

## 2019-09-01 PROCEDURE — 25000132 ZZH RX MED GY IP 250 OP 250 PS 637: Mod: GY | Performed by: EMERGENCY MEDICINE

## 2019-09-01 RX ORDER — ACETAMINOPHEN 500 MG
1000 TABLET ORAL ONCE
Status: DISCONTINUED | OUTPATIENT
Start: 2019-09-01 | End: 2019-09-01 | Stop reason: CLARIF

## 2019-09-01 RX ORDER — ACETAMINOPHEN 325 MG/1
650 TABLET ORAL ONCE
Status: COMPLETED | OUTPATIENT
Start: 2019-09-01 | End: 2019-09-01

## 2019-09-01 RX ADMIN — ACETAMINOPHEN 650 MG: 325 TABLET, FILM COATED ORAL at 19:57

## 2019-09-01 RX ADMIN — WATER 3 ML: 1 INJECTION INTRAMUSCULAR; INTRAVENOUS; SUBCUTANEOUS at 19:17

## 2019-09-01 ASSESSMENT — ENCOUNTER SYMPTOMS
BACK PAIN: 0
PALPITATIONS: 0
WOUND: 1

## 2019-09-01 ASSESSMENT — MIFFLIN-ST. JEOR: SCORE: 1906.53

## 2019-09-01 NOTE — ED PROVIDER NOTES
History     Chief Complaint:  Fall      HPI   Antonio Ramirez is a 74 year old male with history of atrial fibrillation, Antiphospholipid antibody syndrome on Xarelto, who presents to the emergency department today for evaluation of fall. The patient reports earlier today in his assisted living home he was using his walker when he says he suddenly had a mechanical fall causing a laceration on his posterior scalp. There was no loss of consciousness.  He reports no sudden chest pain prior to fall. Due to this fall he presented to the emergency department today. Additionally, he does have a history of falls, but hasn't had one in the last few months. He denies chest pain, back pain, and palpitations. Of note, he is on Xarelto for antiphospholipid antibody syndrome His last tetanus in 2016. He resides at [facility] living with his wife.        Allergies:  Ciprofloxacin  Hmg-Coa-R Inhibitors        Medications:    Gabapentin  Xarelto  Demadex  Thiamine  Coreg  Evolocumab      Past Medical History:    Alcoholism  Amputated left leg  Anemia  Anticardiolipin antibody syndrome  Antiphospholipid antibody syndrome  Antiplatelet long term use  Aortic root dilatation  Aortic stenosis  Arthritis  Balance problem  CAD  Gastroesophageal reflux disease  Heart attack  Hiatal hernia  Hypercholesterolemia  Hypertension  Ischemic cardiomyopathy  Left foot drop  Liver disease  Low grade B cell lymphoproliferative disorder  Moderate mitral regurgitation  Monoclonal gammopathy  Neuropathy  Noninfectious ileitis  Nonrheumatic tricuspid valve regurgitation  Paroxysmal atrial fibrillation  PE  Prostate cancer  Pulmonary hypertension  Renal disease  Syncope  Thrombocytopenia  Urinal stricture   Venous thrombosis      Past Surgical History:    Amputate leg below knee  Appendectomy   Apply wound vac  Arthroplasty Hip  Arthroplasty knee  CABG  Cholecystectomy   Combined cystoscopy, retrogrades, exchange stent  "ureters  Craniotomy  Cystoscopy, dilate urethra, combined  Cystoscopy, remove stents, combined  Endoscopic stripping veins  Cataract surgery  Genitourinary surgery  Umbilical hernia repair  Incision and drainage lower extremity  IR IVC filter placement  Irrigation and debridement lower extremity x2  Laser Holmium Lithotripsy ureters, insert stent, combined x2  Right knee scope  Total hip surgery  Elbow surgery      Family History:    Lung cancer  Heart failure      Social History:  The patient was accompanied to the ED by himself.  Smoking Status: Never Smoker  Smokeless Tobacco: Never Used  Alcohol Use: Yes   Marital Status:   [2]       Review of Systems   Cardiovascular: Negative for chest pain and palpitations.   Musculoskeletal: Negative for back pain.   Skin: Positive for wound (posterior scalp laceration).   All other systems reviewed and are negative.      Physical Exam   First Vitals:  BP: 123/63  Pulse: 81  Temp: 98.8  F (37.1  C)  Resp: 18  Height: 188 cm (6' 2\")  Weight: 110.7 kg (244 lb)  SpO2: 93 %      Physical Exam  Physical Exam   General:  Sitting on bed.  Pt in no significant distress.  Talkative.   HENT:  No obvious trauma to head, other than posterior scalp laceration measuring 2.7 cm. Negative al's sign and negative raccoon eyes bilaterally.  Right Ear:  External ear normal.   Left Ear:  External ear normal.   Nose:  Nose normal. No epistaxis.  Eyes:  Conjunctivae and EOM are normal. Pupils are equal, round, and reactive.   Neck: Normal range of motion. Neck supple. No tracheal deviation present. No midline cervical neck tenderness, deformity, step off or pain in the midline with ROM.  CV:  Normal heart sounds. No murmur heard.  Pulm/Chest: Effort normal and breath sounds normal.   Abd: Soft. No distension. There is no tenderness. There is no rigidity, no rebound and no guarding.   M/S: Normal range of motion. LLE prosthesis in place-evidence of BKA. No pain to palpation or deformity " of all 3 extremities.  Pelvis stable to compression.  No pain with internal or external rotation of both hips.  No pain to palpation of step off to thoracic and lumbar spine.  Neuro: Alert. CN II-XII Grossly intact. GCS 15.  Skin: Skin is warm and dry. No rash noted. Not diaphoretic.   Psych: Normal mood and affect. Behavior is normal.      Emergency Department Course     Imaging:  Radiology findings were communicated with the patient who voiced understanding of the findings.    CT Head w/o Contrast  IMPRESSION: No acute intracranial abnormality.  Reading per radiology     Procedures:    Laceration Repair        LACERATION:  A simple and superficial non-contaminated 2.7 cm laceration.      LOCATION:  Posterior scalp      FUNCTION:  Distally sensation and circulation are intact.      ANESTHESIA:  LET - Topical      PREPARATION:  Irrigation and Scrubbing with Normal Saline and Shur Clens      DEBRIDEMENT:  wound explored, no foreign body found      CLOSURE:  Wound was closed with 6 Staples    Emergency Department Course:  Nursing notes and vitals reviewed.  1913: I performed an exam of the patient as documented above.    The patient was sent for a CT Head while in the emergency department, results above.      1951 I performed the above procedure.     Findings and plan explained to the Patient. Patient discharged home with instructions regarding supportive care, medications, and reasons to return. The importance of close follow-up was reviewed.   I personally reviewed the imaging results with the Patient and answered all related questions prior to discharge.   Impression & Plan      Medical Decision Making:  Antonio Ramirez is a very pleasant 74 year old patient who presents for evaluation of a fall and laceration.  Due to the patient's anticoagulation, they were at risk of intracranial hemorrhage.  CT of the head was obtained and is negative for acute ICH or skull fracture.  The CT head result was discussed with the  patient.  The possibility of delayed onset bleeding was discussed with the patient and they be accompanied by a responsible person for the next 24 hours to observe for any neurologic changes.  Head injury precautions and reasons to return to the emergency department were discussed. The patient's head laceration was repaired as noted above.  There was no evidence of deep structure injury, skull fracture or violation of the galea.  Pt was given wound care instructions and will follow up with PCP for staple removal. Based on a full exam, I did not have concern for any additional traumatic injuries.  The patient describes a mechanical accident and therefore no additional evaluation for metabolic or cardiac etiology was warranted at this time.  The patient will follow up with primary care and is given symptoms to return for including headache, weakness, concern for infection, etc.    The treatment plan was discussed with the patient and they expressed understanding of this plan and consented to the plan.  In addition, the patient will return to the emergency department if their symptoms persist, worsen, if new symptoms arise or if there is any concern as other pathology may be present that is not evident at this time. They also understand the importance of close follow up in the clinic and if unable to do so will return to the emergency department for a reevaluation. All questions were answered.    Diagnosis:    ICD-10-CM    1. Scalp laceration, initial encounter S01.01XA    2. Fall, initial encounter W19.XXXA        Disposition:  Discharged to home.       Scribe Disclosure:  Jess COUCH, am serving as a scribe at 7:52 PM on 9/1/2019 to document services personally performed by Antonio Vilchis DO based on my observations and the provider's statements to me.    Jess Silva  9/1/2019    EMERGENCY DEPARTMENT       Antonio Vilchis DO  09/01/19 2005

## 2019-09-01 NOTE — ED NOTES
Bed: ED19  Expected date: 9/1/19  Expected time: 6:46 PM  Means of arrival: Ambulance  Comments:  Steve 74m fall, head injury ETA 2551

## 2019-09-01 NOTE — ED TRIAGE NOTES
Tripped and fell at [facility], hit posterior head and sustained head  laceration. On Xarelto. Hs of frequent falls due to balance issues from left BKA.

## 2019-09-01 NOTE — ED AVS SNAPSHOT
Emergency Department  [address] [place] [id number]  Phone:  [phone]  Fax:  [phone]                                    Antonio Ramirez   MRN: [id number]    Department:   Emergency Department   Date of Visit:  9/1/2019           After Visit Summary Signature Page    I have received my discharge instructions, and my questions have been answered. I have discussed any challenges I see with this plan with the nurse or doctor.    ..........................................................................................................................................  Patient/Patient Representative Signature      ..........................................................................................................................................  Patient Representative Print Name and Relationship to Patient    ..................................................               ................................................  Date                                   Time    ..........................................................................................................................................  Reviewed by Signature/Title    ...................................................              ..............................................  Date                                               Time          22EPIC Rev 08/18

## 2019-09-03 ENCOUNTER — PATIENT OUTREACH (OUTPATIENT)
Dept: CARE COORDINATION | Facility: CLINIC | Age: 74
End: 2019-09-03

## 2019-09-03 NOTE — RESULT ENCOUNTER NOTE
Sage,     Your x-ray looks improved from last check.  This could mean that your previous pneumonia responded to antibiotics or that the regular paracenteses are helping manage ascites fluid that is tracking up into the right lower lung space.  Options for management of the remaining fluid include doing nothing new and monitoring the right lung closely clinically and with serial x-rays if needed.  Another option is doubling the dose of your torsemide (water pill) that you are taking and watching your kidney function carefully upon doing so.  The idea there would be to attempt to dry out the fluid from the bottom of the lung by making you urinate out more fluid.  The last option is to arrange for a procedure called a thoracentesis where the radiologist drains fluid from the base of the lung.  An advantage to that procedure would be that we could analyze the fluid and make sure it does not represent residual infection or a new problem like a lung cancer (which is unlikely).   The disadvantage of  that procedure is that it is likely to only provide temporary resolution of the lung fluid problem and carries risk of lung injury, bleeding and infection.   Let me know if you want us to set you up for that procedure or if you plan to increase your torsemide from 20 mg once daily to 20 mg twice daily.  I have an appointment to see you on October 2nd, but if you increase the torsemide, we should check your kidney blood test after your have been doing so for 2-3 weeks.  You can schedule a lab appointment for that purpose.      Sincerely,    Main Hardy MD

## 2019-09-03 NOTE — PROGRESS NOTES
"Clinic Care Coordination Contact    Follow Up Progress Note      Assessment: Due to CC RN being out of clinic, CC FAHEEM contacted pt to discuss his wellbeing after his visit to ED for a mechanical fall in which he received a laceration on his posterior scalp. Pt displayed reluctance to speak with FLORENCIO MAYEN regarding his ED visit and was frustrated with questions about his overall wellbeing after his head injury. Pt stated that he is doing fine. He stated multiple times that \"he is one of the smart ones\" and therefore will contact a triage nurse or return to the ED if he has any further concerns.    Goals addressed this encounter:   Goals        Patient Stated      #1 Functional (pt-stated)      Goal Statement: I will increase my strength by keeping future home physical therapy appointments  Measure of Success: More mobility with daily activities   Supportive Steps to Achieve: I will do home physical therapy exercises as directed   Barriers: Deconditioned   Strengths: Supportive wife   Date to Achieve By: 9/19/2019  Patient expressed understanding of goal: Yes          Outreach Frequency: 2 weeks    Plan: CC RN will continue to follow up with pt regarding goal progression. Per chart review, phone call on 8/29 states pt will continue home care PT for another 4 weeks.    Care Coordinator RN will follow up upon discharge from [facility].    CRISTOBAL Pond, LGSW  Clinic Care Coordinator  [facility]  [phone]  [email or web address]  "

## 2019-09-04 ENCOUNTER — TELEPHONE (OUTPATIENT)
Dept: MEDSURG UNIT | Facility: CLINIC | Age: 74
End: 2019-09-04

## 2019-09-04 RX ORDER — GABAPENTIN 600 MG/1
600 TABLET ORAL 2 TIMES DAILY
COMMUNITY
End: 2020-01-01

## 2019-09-09 ENCOUNTER — HOSPITAL ENCOUNTER (OUTPATIENT)
Facility: CLINIC | Age: 74
Discharge: HOME | End: 2019-09-09
Payer: MEDICARE

## 2019-09-09 ENCOUNTER — HOSPITAL ENCOUNTER (OUTPATIENT)
Dept: ULTRASOUND IMAGING | Facility: CLINIC | Age: 74
Discharge: HOME | End: 2019-09-09
Attending: INTERNAL MEDICINE | Admitting: INTERNAL MEDICINE
Payer: MEDICARE

## 2019-09-09 VITALS
RESPIRATION RATE: 18 BRPM | DIASTOLIC BLOOD PRESSURE: 48 MMHG | SYSTOLIC BLOOD PRESSURE: 105 MMHG | OXYGEN SATURATION: 98 % | HEART RATE: 72 BPM | TEMPERATURE: 96 F

## 2019-09-09 DIAGNOSIS — K70.31 ALCOHOLIC CIRRHOSIS OF LIVER WITH ASCITES (H): ICD-10-CM

## 2019-09-09 PROCEDURE — 40000863 ZZH STATISTIC RADIOLOGY XRAY, US, CT, MAR, NM

## 2019-09-09 PROCEDURE — 27210190 US PARACENTESIS

## 2019-09-09 PROCEDURE — 25000125 ZZHC RX 250: Performed by: PHYSICIAN ASSISTANT

## 2019-09-09 RX ORDER — NICOTINE POLACRILEX 4 MG
15-30 LOZENGE BUCCAL
Status: DISCONTINUED | OUTPATIENT
Start: 2019-09-09 | End: 2019-09-09 | Stop reason: HOSPADM

## 2019-09-09 RX ORDER — DEXTROSE MONOHYDRATE 25 G/50ML
25-50 INJECTION, SOLUTION INTRAVENOUS
Status: DISCONTINUED | OUTPATIENT
Start: 2019-09-09 | End: 2019-09-09 | Stop reason: HOSPADM

## 2019-09-09 RX ORDER — LIDOCAINE HYDROCHLORIDE 10 MG/ML
10 INJECTION, SOLUTION EPIDURAL; INFILTRATION; INTRACAUDAL; PERINEURAL ONCE
Status: COMPLETED | OUTPATIENT
Start: 2019-09-09 | End: 2019-09-09

## 2019-09-09 RX ORDER — LIDOCAINE 40 MG/G
CREAM TOPICAL
Status: DISCONTINUED | OUTPATIENT
Start: 2019-09-09 | End: 2019-09-09 | Stop reason: HOSPADM

## 2019-09-09 RX ADMIN — LIDOCAINE HYDROCHLORIDE 10 ML: 10 INJECTION, SOLUTION EPIDURAL; INFILTRATION; INTRACAUDAL; PERINEURAL at 14:12

## 2019-09-09 NOTE — PROGRESS NOTES
Care Suites Procedure Nursing Note    Procedure: Paracentesis: patient taken to department via cart. Ultrasound tech performed preliminary scan to find pockets of fluid. Ran MCDANIEL arrived to explain procedure, obtained consent. Time out was then done. Procedure begun, no issues, drainage done. Patient was monitored with BP and O2 sats. Patient tolerated well. VSS.   Procedure started time: 1420  Procedure completed time: 1440  Concerns/abnormal assessment: 4100 cc brownish colored fluid removed from abdomen w/o difficulty. Bandaid applied to site. Dermabond applied to site to prevent leaking fluid.  Plan: Pt back to [facility] per cart. Pt tolerated food and fluids without issue. Site remains clean dry intact. Ready to discharge. Wheelchair escorted pt to door, all belongings sent with pt. Wife arrived with car to hospital door, pt transferred into car no issues.    Care Suites Discharge Nursing Note    Education/questions answered: Yes  Patient DC location: Home   Accompanied by: Wife  CS discharge time: 1515

## 2019-09-09 NOTE — PROGRESS NOTES
Care Suites Admission Nursing Note    Reason for admission: paracentesis  CS arrival time: 1300  Accompanied by: self  Name/phone of DC : wife Helena [phone]  Medications held: none  Consent signed: yes  Abnormal assessment/labs: na  If abnormal, provider notified: na  Education/questions answered: yes  Plan: para at 1400

## 2019-09-09 NOTE — PROGRESS NOTES
RADIOLOGY PROCEDURE NOTE  Patient name: Antonio Ramirez  MRN: [id number]  : 1945    Pre-procedure diagnosis: Ascites  Post-procedure diagnosis: Same    Procedure Date/Time: 2019  2:26 PM  Procedure: Paracentesis  Estimated blood loss: None  Specimen(s) collected with description: Blood tinged fluid  The patient tolerated the procedure well with no immediate complications.  Significant findings:none    See imaging dictation for procedural details.    Provider name: Ran Giraldo PA-C  Assistant(s):None

## 2019-09-09 NOTE — DISCHARGE INSTRUCTIONS

## 2019-09-11 ENCOUNTER — OFFICE VISIT (OUTPATIENT)
Dept: FAMILY MEDICINE | Facility: CLINIC | Age: 74
End: 2019-09-11
Payer: MEDICARE

## 2019-09-11 VITALS
SYSTOLIC BLOOD PRESSURE: 106 MMHG | TEMPERATURE: 97.8 F | OXYGEN SATURATION: 98 % | WEIGHT: 244 LBS | HEIGHT: 74 IN | HEART RATE: 53 BPM | DIASTOLIC BLOOD PRESSURE: 69 MMHG | BODY MASS INDEX: 31.32 KG/M2

## 2019-09-11 DIAGNOSIS — S01.01XS LACERATION OF SCALP, SEQUELA: ICD-10-CM
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
## 2019-09-11 DIAGNOSIS — Z48.02 ENCOUNTER FOR STAPLE REMOVAL: Primary | ICD-10-CM

## 2019-09-11 PROCEDURE — 99213 OFFICE O/P EST LOW 20 MIN: CPT | Performed by: INTERNAL MEDICINE

## 2019-09-11 ASSESSMENT — MIFFLIN-ST. JEOR: SCORE: 1906.53

## 2019-09-11 NOTE — PROGRESS NOTES
"Chief Complaint:         Antonio Ramirez is a 74 year old male who presents to clinic today for the following health issues:      ED/UC Followup:    Facility:   Emergency Department  Date of visit: 09/01/2019  Reason for visit: Scalp laceration, Fall  Current Status: Stable     Patient presents to clinic today for staples removal from scalp laceration wound due to recent fall.          Current Medications:     Current Outpatient Medications   Medication Sig Dispense Refill     gabapentin (NEURONTIN) 600 MG tablet Take 600 mg by mouth 2 times daily       multivitamin, therapeutic (THERA-VIT) TABS tablet Take 1 tablet by mouth daily       rivaroxaban ANTICOAGULANT (XARELTO) 15 MG TABS tablet Take 1 tablet (15 mg) by mouth daily (with dinner)       torsemide (DEMADEX) 20 MG tablet Take 20 mg by mouth 2 times daily        vitamin B1 (THIAMINE) 100 MG tablet Take 1 tablet (100 mg) by mouth daily           Allergies:      Allergies   Allergen Reactions     Ciprofloxacin Itching     Severe itching \"like ants were crawling\"     Hmg-Coa-R Inhibitors Other (See Comments)     Rhabdomyolysis occurred within a couple days            Past Medical History:     Past Medical History:   Diagnosis Date     Alcoholism (H)      Amputated left leg (H)      Anemia      Anticardiolipin antibody syndrome (H)      Antiphospholipid antibody syndrome (H)      Antiplatelet or antithrombotic long-term use      Aortic root dilatation (H)      Aortic stenosis      Arthritis      Balance problem      Coronary artery disease     CABG 2007: SVG to LAD, SVG to diagonal, SVG to OM; cardiac cath 5/17/18: thrombectomy for restenosis of stents-sent for urgent CABG, cath 5/14/2007: GRECIA x2 to LAD, Grecia to diagonal     Gastro-oesophageal reflux disease      Heart attack (H) 2007    apparently arrested, cardiac X5     Hiatal hernia      Hypercholesterolemia      Hypertension      Ischemic cardiomyopathy      Left foot drop      Liver disease     alcoholic " liver disease, alcoholic cirrohsosis, portal hypertension     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)     ?When diagnosed, patient not aware of this     Moderate mitral regurgitation      Monoclonal gammopathy      Neuropathy      Noninfectious ileitis     diverticulitis of colon     Nonrheumatic tricuspid valve regurgitation      Paroxysmal atrial fibrillation (H)      PE (pulmonary embolism) 2006    saddle emboli 2006     Prostate cancer (H) 2000    Treated with radiation (seed implants in 2000) -- no problems since     Pulmonary hypertension (H)      Renal disease     kidney stones     Syncope      Thrombocytopenia (H)      Urethral stricture      Venous thrombosis     IVC filter and lysis procedures 2007         Past Surgical History:     Past Surgical History:   Procedure Laterality Date     AMPUTATE LEG BELOW KNEE Left 04/2014    related to gangrene, started as foot ulcer related to neuropathy     AMPUTATE LEG BELOW KNEE Left 12/4/2017    Procedure: AMPUTATE LEG BELOW KNEE;  Exploration of Left Leg Below Knee Amputation Stump with Ligation of Bleeders.;  Surgeon: Leandro Arreola MD;  Location:  OR     APPENDECTOMY       APPLY WOUND VAC Left 12/6/2017    Procedure: APPLY WOUND VAC;;  Surgeon: Saima Howard MD;  Location:  SD     ARTHROPLASTY HIP Right 11/27/2018    Procedure: RIGHT TOTAL HIP ARTHROPLASTY;  Surgeon: Saima Howard MD;  Location:  OR     ARTHROPLASTY KNEE Right 9/19/2014    Procedure: ARTHROPLASTY KNEE;  Surgeon: Saima Howard MD;  Location:  OR     CARDIAC SURGERY      CABG     CHOLECYSTECTOMY       COLONOSCOPY       COMBINED CYSTOSCOPY, RETROGRADES, EXCHANGE STENT URETER(S) Left 7/24/2018    Procedure: COMBINED CYSTOSCOPY, RETROGRADES, EXCHANGE STENT URETER(S);  CYSTOSCOPY, BILATERAL RETROGRADES, BILATERAL URETERAL STENT EXCHANGE ;  Surgeon: Matt Cervantes MD;  Location:  OR     CRANIOTOMY Right 4/7/2018    Procedure: CRANIOTOMY;  Right  Craniotomy For Subdural Hematoma;  Surgeon: Jono Issa MD;  Location:  OR     CYSTOSCOPY, DILATE URETHRA, COMBINED N/A 10/12/2016    Procedure: COMBINED CYSTOSCOPY, DILATE URETHRA;  Surgeon: Dimitry Bello MD;  Location:  OR     CYSTOSCOPY, REMOVE STENT(S), COMBINED Right 7/24/2018    Procedure: COMBINED CYSTOSCOPY, REMOVE STENT(S);;  Surgeon: Jose Hunter MD;  Location:  OR     ENDOSCOPIC STRIPPING VEIN(S)       esophagogastroduodenoscopy       ESOPHAGOSCOPY, GASTROSCOPY, DUODENOSCOPY (EGD), COMBINED N/A 3/11/2019    Procedure: COMBINED ESOPHAGOSCOPY, GASTROSCOPY, DUODENOSCOPY (EGD), BIOPSY SINGLE OR MULTIPLE;  Surgeon: Katherin Boyd MD;  Location:  GI     EYE SURGERY      cataracts     GENITOURINARY SURGERY      Prostate Seen Implants     HERNIA REPAIR      Umbilical     INCISION AND DRAINAGE LOWER EXTREMITY, COMBINED Left 12/1/2017    Procedure: COMBINED INCISION AND DRAINAGE LOWER EXTREMITY;  INCISION AND DRAINAGE OF LEFT BELOW KNEE AMPUTATION ABSCESS, WOUND VAC PLACEMENT;  Surgeon: Saima Howard MD;  Location:  OR     IR IVC FILTER PLACEMENT       IRRIGATION AND DEBRIDEMENT LOWER EXTREMITY, COMBINED Left 12/6/2017    Procedure: COMBINED IRRIGATION AND DEBRIDEMENT LOWER EXTREMITY;  IRRIGATION AND DEBRIDEMENT OF LEFT BELOW KNEE AMPUTATION SITE WITH WOUND VAC REPLACEMENT;  Surgeon: Saima Howard MD;  Location:  SD     IRRIGATION AND DEBRIDEMENT LOWER EXTREMITY, COMBINED Left 12/8/2017    Procedure: COMBINED IRRIGATION AND DEBRIDEMENT LOWER EXTREMITY;  IRRIGATION AND DEBRIDEMENT OF LEFT BELOW THE KNEE AMPUTATION AND SHORTENING OF THE TIBIA WITH WOUND CLOSURE;  Surgeon: Saima Howard MD;  Location:  OR     LASER HOLMIUM LITHOTRIPSY URETER(S), INSERT STENT, COMBINED Bilateral 6/28/2018    Procedure: COMBINED CYSTOSCOPY, URETEROSCOPY, LASER HOLMIUM LITHOTRIPSY URETER(S), INSERT STENT;  CYSTOSCOPY, BILATERAL URETEROSCOPY,  HOLMIUM LASER LITHOTRIPSY,  BILATERAL STENT PLACEMENT, STONE BASKETING;  Surgeon: Jose Hunter MD;  Location:  OR     LASER HOLMIUM LITHOTRIPSY URETER(S), INSERT STENT, COMBINED Left 2018    Procedure: COMBINED CYSTOSCOPY, URETEROSCOPY, LASER HOLMIUM LITHOTRIPSY URETER(S), INSERT STENT;  CYSTOSCOPY, LEFT URETEROSCOPY, LEFT LASER HOLMIUM LITHOTRIPSY URETER(S) REMOVAL BILATERAL STENTS;  Surgeon: Jose Hunter MD;  Location:  OR     ORTHOPEDIC SURGERY      (R) knee scope     ORTHOPEDIC SURGERY      total hip      ORTHOPEDIC SURGERY      elbow surgery         Family Medical History:     Family History   Problem Relation Age of Onset     Lung Cancer Mother      Heart Failure Father          age 87         Social History:     Social History     Socioeconomic History     Marital status:      Spouse name: Not on file     Number of children: 2     Years of education: Not on file     Highest education level: Not on file   Occupational History     Occupation: Retired    Social Needs     Financial resource strain: Not on file     Food insecurity:     Worry: Not on file     Inability: Not on file     Transportation needs:     Medical: Not on file     Non-medical: Not on file   Tobacco Use     Smoking status: Never Smoker     Smokeless tobacco: Never Used   Substance and Sexual Activity     Alcohol use: Yes     Comment: quit 10/2015; now abt 1-2 vodka's per night     Drug use: No     Sexual activity: Not Currently     Partners: Female   Lifestyle     Physical activity:     Days per week: Not on file     Minutes per session: Not on file     Stress: Not on file   Relationships     Social connections:     Talks on phone: Not on file     Gets together: Not on file     Attends [employer] service: Not on file     Active member of club or organization: Not on file     Attends meetings of clubs or organizations: Not on file     Relationship status: Not on file     Intimate partner violence:     Fear of current or ex  "partner: Not on file     Emotionally abused: Not on file     Physically abused: Not on file     Forced sexual activity: Not on file   Other Topics Concern     Parent/sibling w/ CABG, MI or angioplasty before 65F 55M? Not Asked   Social History Narrative    , 2 children, retired . He does not have a living will but desires full code.  (last updated 2/28/2019)            Review of System:     Constitutional: Negative for fever or chills  Skin: Negative for rashes  Ears/Nose/Throat: Negative for nasal congestion, sore throat  Respiratory: No shortness of breath, dyspnea on exertion, cough, or hemoptysis  Cardiovascular: Negative for chest pain  Gastrointestinal: Negative for nausea, vomiting  Genitourinary: Negative for dysuria, hematuria  Musculoskeletal: positive for recent fall  Neurologic: Negative for headaches  Psychiatric: Negative for depression, anxiety  Hematologic/Lymphatic/Immunologic: Negative  Endocrine: Negative  Behavioral: Negative for tobacco use       Physical Exam:   /69 (BP Location: Left arm, Patient Position: Sitting, Cuff Size: Adult Large)   Pulse 53   Temp 97.8  F (36.6  C) (Oral)   Ht 1.88 m (6' 2\")   Wt 110.7 kg (244 lb)   SpO2 98%   BMI 31.33 kg/m      GENERAL: chronically ill appearing elderly male, alert and no distress  EYES: eyes grossly normal to inspection, and conjunctivae and sclerae normal  HENT: scalp laceration wound appears to have healed with staples in place  NECK: supple  RESP: lungs clear to auscultation   CV: regular rate and rhythm, normal S1 S2  LYMPH: no peripheral edema   ABDOMEN: nondistended  SKIN: no suspicious lesions or rashes  NEURO: Alert & Oriented x 3.   PSYCH: mentation appears normal, affect normal        Diagnostic Test Results:     Diagnostic Test Results:  Results for orders placed or performed during the hospital encounter of 09/09/19   US Paracentesis    Narrative    EXAM: US PARACENTESIS  9/9/2019 2:50 PM   "     HISTORY:Alcoholic cirrhosis of liver with ascites (H)       PROCEDURE:  Written informed consent was obtained from the patient  prior to the procedure. The risks and benefits including bleeding,  infection and abdominal organ injury were discussed and the patient  wished to continue. Initial ultrasound images demonstrated a large  left lower quadrant fluid collection. A permanent image was saved. The  skin overlying this collection was marked, prepped, draped and  anesthetized in usual sterile fashion utilizing 10 mL of lidocaine 1%.  The paracentesis catheter was then placed into the peritoneal fluid  collection with return of 4100 mL of dalia colored fluid under  ultrasound guidance. Estimated blood loss during the procedure was  less than 5 mL. No specimens collected. Patient tolerated the  procedure well. The patient will receive intravenous albumin on the  usual sliding scale as needed per protocol.        Impression    IMPRESSION:  Ultrasound-guided paracentesis.      NAYA CARDENAS PA-C       ASSESSMENT/PLAN:       (Z48.02) Encounter for staple removal  (primary encounter diagnosis)  (W19.XXXS) Fall, sequela  (S01.01XS) Laceration of scalp, sequela  Comment: post ER discharge follow up of recent scalp laceration wound due to fall, which has healed  Plan: I have removed the patient's staples from the healed scalp laceration wound in clinic today with a staple removal kit.        Follow Up Plan:     Patient is instructed to return to Internal Medicine clinic for follow-up visit in 1 month.        Iliana Hernandez MD  Internal Medicine  [facility]

## 2019-09-16 ENCOUNTER — TELEPHONE (OUTPATIENT)
Dept: MEDSURG UNIT | Facility: CLINIC | Age: 74
End: 2019-09-16

## 2019-09-23 ENCOUNTER — HOSPITAL ENCOUNTER (OUTPATIENT)
Dept: ULTRASOUND IMAGING | Facility: CLINIC | Age: 74
End: 2019-09-23
Attending: INTERNAL MEDICINE
Payer: MEDICARE

## 2019-09-23 ENCOUNTER — HOSPITAL ENCOUNTER (OUTPATIENT)
Facility: CLINIC | Age: 74
Discharge: HOME | End: 2019-09-23
Payer: MEDICARE

## 2019-09-23 ENCOUNTER — HOSPITAL ENCOUNTER (OUTPATIENT)
Dept: CARDIOLOGY | Facility: CLINIC | Age: 74
Discharge: HOME | End: 2019-09-23
Attending: INTERNAL MEDICINE | Admitting: INTERNAL MEDICINE
Payer: MEDICARE

## 2019-09-23 VITALS
HEART RATE: 55 BPM | RESPIRATION RATE: 16 BRPM | DIASTOLIC BLOOD PRESSURE: 47 MMHG | SYSTOLIC BLOOD PRESSURE: 104 MMHG | TEMPERATURE: 98 F | OXYGEN SATURATION: 94 %

## 2019-09-23 DIAGNOSIS — K70.31 ALCOHOLIC CIRRHOSIS OF LIVER WITH ASCITES (H): ICD-10-CM

## 2019-09-23 DIAGNOSIS — I25.10 CORONARY ARTERY DISEASE INVOLVING NATIVE CORONARY ARTERY OF NATIVE HEART WITHOUT ANGINA PECTORIS: ICD-10-CM

## 2019-09-23 PROCEDURE — 25500064 ZZH RX 255 OP 636: Performed by: INTERNAL MEDICINE

## 2019-09-23 PROCEDURE — 40000863 ZZH STATISTIC RADIOLOGY XRAY, US, CT, MAR, NM

## 2019-09-23 PROCEDURE — 25000125 ZZHC RX 250: Performed by: RADIOLOGY

## 2019-09-23 PROCEDURE — 27210190 US PARACENTESIS

## 2019-09-23 PROCEDURE — 93306 TTE W/DOPPLER COMPLETE: CPT | Mod: 26 | Performed by: INTERNAL MEDICINE

## 2019-09-23 PROCEDURE — 40000264 ECHOCARDIOGRAM COMPLETE

## 2019-09-23 RX ORDER — DEXTROSE MONOHYDRATE 25 G/50ML
25-50 INJECTION, SOLUTION INTRAVENOUS
Status: DISCONTINUED | OUTPATIENT
Start: 2019-09-23 | End: 2019-09-23 | Stop reason: HOSPADM

## 2019-09-23 RX ORDER — LIDOCAINE HYDROCHLORIDE 10 MG/ML
10 INJECTION, SOLUTION EPIDURAL; INFILTRATION; INTRACAUDAL; PERINEURAL ONCE
Status: COMPLETED | OUTPATIENT
Start: 2019-09-23 | End: 2019-09-23

## 2019-09-23 RX ORDER — LOPERAMIDE HCL 2 MG
4 CAPSULE ORAL PRN
COMMUNITY
End: 2020-03-31

## 2019-09-23 RX ORDER — NICOTINE POLACRILEX 4 MG
15-30 LOZENGE BUCCAL
Status: DISCONTINUED | OUTPATIENT
Start: 2019-09-23 | End: 2019-09-23 | Stop reason: HOSPADM

## 2019-09-23 RX ORDER — LIDOCAINE 40 MG/G
CREAM TOPICAL
Status: DISCONTINUED | OUTPATIENT
Start: 2019-09-23 | End: 2019-09-23 | Stop reason: HOSPADM

## 2019-09-23 RX ADMIN — LIDOCAINE HYDROCHLORIDE 10 ML: 10 INJECTION, SOLUTION EPIDURAL; INFILTRATION; INTRACAUDAL; PERINEURAL at 14:04

## 2019-09-23 RX ADMIN — HUMAN ALBUMIN MICROSPHERES AND PERFLUTREN 9 ML: 10; .22 INJECTION, SOLUTION INTRAVENOUS at 12:04

## 2019-09-23 NOTE — PROGRESS NOTES
Paracentesis: Pt tolerated well. VSS. 5700 cc cloudy kristen tinged dalia fluid removed from abdomen w/o difficulty. Dermabond applied to site - CDI. No Albumin given per protocol.     Pt back to [facility].

## 2019-09-23 NOTE — DISCHARGE INSTRUCTIONS

## 2019-09-23 NOTE — PROGRESS NOTES
[facility] Admission Nursing Note    Reason for admission: Paracentesis   CS arrival time: 1230  Accompanied by: none  Name/phone of DC : wife Helena [phone]  Medications held: Xarelto  Consent signed: in the radiology  Abnormal assessment/labs: NA  If abnormal, provider notified: LAMAR  Education/questions answered: yes.  Reviewed discharge instruction to pt. All questions are answered.  Plan: Proceed with procedure  Report back to Anuja, deborah ROBINS

## 2019-09-23 NOTE — PLAN OF CARE
[facility] Discharge Nursing Note    Education/questions answered: Yes, pt declined printed copy of AVS. Information reviewed and pt signed.  Patient DC location: home  Accompanied by: spouse  CS discharge time: 1504

## 2019-09-23 NOTE — PROCEDURES
[facility]    Procedure: Paracentesis  Date/Time: 9/23/2019 2:51 PM  Performed by: Jose Giles MD  Authorized by: Jose Giles MD     UNIVERSAL PROTOCOL   Site Marked: Yes  Prior Images Obtained and Reviewed:  Yes  Required items: Required blood products, implants, devices and special equipment available    Patient identity confirmed:  Verbally with patient  NA - No sedation, light sedation, or local anesthesia  Confirmation Checklist:  Patient's identity using two indicators, procedure was appropriate and matched the consent or emergent situation, correct equipment/implants were available and relevant allergies  Time out: Immediately prior to the procedure a time out was called    Preparation: Patient was prepped and draped in usual sterile fashion    PROCEDURE   Patient Tolerance:  Patient tolerated the procedure well with no immediate complications    Time of Sedation in Minutes by Physician:  Kelsie

## 2019-09-23 NOTE — PLAN OF CARE
[facility] Post-Procedure Note    Procedure: Paracentesis  CS arrival time: 1448  Accompanied by: Procedure RN Niki MOHAMUD  Concerns/abnormal assessment after procedure: none  Plan: discharge home

## 2019-09-27 ENCOUNTER — HOSPITAL ENCOUNTER (EMERGENCY)
Facility: CLINIC | Age: 74
Discharge: HOME | End: 2019-09-27
Attending: EMERGENCY MEDICINE | Admitting: EMERGENCY MEDICINE
Payer: MEDICARE

## 2019-09-27 ENCOUNTER — NURSE TRIAGE (OUTPATIENT)
Dept: NURSING | Facility: CLINIC | Age: 74
End: 2019-09-27

## 2019-09-27 ENCOUNTER — HOSPITAL ENCOUNTER (EMERGENCY)
Facility: CLINIC | Age: 74
End: 2019-09-27
Payer: MEDICARE

## 2019-09-27 VITALS
TEMPERATURE: 98 F | RESPIRATION RATE: 17 BRPM | DIASTOLIC BLOOD PRESSURE: 56 MMHG | SYSTOLIC BLOOD PRESSURE: 109 MMHG | OXYGEN SATURATION: 83 % | HEART RATE: 69 BPM
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
## 2019-09-27 DIAGNOSIS — T18.128A ESOPHAGEAL OBSTRUCTION DUE TO FOOD IMPACTION: ICD-10-CM

## 2019-09-27 LAB
ALBUMIN SERPL-MCNC: 1.9 G/DL (ref 3.4–5)
ALP SERPL-CCNC: 93 U/L (ref 40–150)
ALT SERPL W P-5'-P-CCNC: 10 U/L (ref 0–70)
ANION GAP SERPL CALCULATED.3IONS-SCNC: 2 MMOL/L (ref 3–14)
AST SERPL W P-5'-P-CCNC: 27 U/L (ref 0–45)
BASOPHILS # BLD AUTO: 0.1 10E9/L (ref 0–0.2)
BASOPHILS NFR BLD AUTO: 1.7 %
BILIRUB SERPL-MCNC: 1 MG/DL (ref 0.2–1.3)
BUN SERPL-MCNC: 20 MG/DL (ref 7–30)
CALCIUM SERPL-MCNC: 8.5 MG/DL (ref 8.5–10.1)
CHLORIDE SERPL-SCNC: 109 MMOL/L (ref 94–109)
CO2 SERPL-SCNC: 29 MMOL/L (ref 20–32)
CREAT SERPL-MCNC: 1.6 MG/DL (ref 0.66–1.25)
DIFFERENTIAL METHOD BLD: ABNORMAL
EOSINOPHIL # BLD AUTO: 0.2 10E9/L (ref 0–0.7)
EOSINOPHIL NFR BLD AUTO: 3.6 %
ERYTHROCYTE [DISTWIDTH] IN BLOOD BY AUTOMATED COUNT: 16.6 % (ref 10–15)
GFR SERPL CREATININE-BSD FRML MDRD: 41 ML/MIN/{1.73_M2}
GLUCOSE SERPL-MCNC: 86 MG/DL (ref 70–99)
HCT VFR BLD AUTO: 30.5 % (ref 40–53)
HGB BLD-MCNC: 9.7 G/DL (ref 13.3–17.7)
IMM GRANULOCYTES # BLD: 0 10E9/L (ref 0–0.4)
IMM GRANULOCYTES NFR BLD: 0.2 %
INR PPP: 3 (ref 0.86–1.14)
LYMPHOCYTES # BLD AUTO: 0.7 10E9/L (ref 0.8–5.3)
LYMPHOCYTES NFR BLD AUTO: 15.7 %
MCH RBC QN AUTO: 28.3 PG (ref 26.5–33)
MCHC RBC AUTO-ENTMCNC: 31.8 G/DL (ref 31.5–36.5)
MCV RBC AUTO: 89 FL (ref 78–100)
MONOCYTES # BLD AUTO: 0.7 10E9/L (ref 0–1.3)
MONOCYTES NFR BLD AUTO: 16.2 %
NEUTROPHILS # BLD AUTO: 2.6 10E9/L (ref 1.6–8.3)
NEUTROPHILS NFR BLD AUTO: 62.6 %
NRBC # BLD AUTO: 0 10*3/UL
NRBC BLD AUTO-RTO: 0 /100
PLATELET # BLD AUTO: 168 10E9/L (ref 150–450)
POTASSIUM SERPL-SCNC: 3.7 MMOL/L (ref 3.4–5.3)
PROT SERPL-MCNC: 6.2 G/DL (ref 6.8–8.8)
RBC # BLD AUTO: 3.43 10E12/L (ref 4.4–5.9)
SODIUM SERPL-SCNC: 140 MMOL/L (ref 133–144)
UPPER GI ENDOSCOPY: NORMAL
WBC # BLD AUTO: 4.2 10E9/L (ref 4–11)

## 2019-09-27 PROCEDURE — 80053 COMPREHEN METABOLIC PANEL: CPT | Performed by: EMERGENCY MEDICINE

## 2019-09-27 PROCEDURE — 99283 EMERGENCY DEPT VISIT LOW MDM: CPT | Mod: 25

## 2019-09-27 PROCEDURE — 85610 PROTHROMBIN TIME: CPT | Performed by: EMERGENCY MEDICINE

## 2019-09-27 PROCEDURE — 25000128 H RX IP 250 OP 636: Performed by: INTERNAL MEDICINE

## 2019-09-27 PROCEDURE — G0500 MOD SEDAT ENDO SERVICE >5YRS: HCPCS | Performed by: INTERNAL MEDICINE

## 2019-09-27 PROCEDURE — 85025 COMPLETE CBC W/AUTO DIFF WBC: CPT | Performed by: EMERGENCY MEDICINE

## 2019-09-27 PROCEDURE — 43247 EGD REMOVE FOREIGN BODY: CPT | Performed by: INTERNAL MEDICINE

## 2019-09-27 RX ORDER — FLUMAZENIL 0.1 MG/ML
0.2 INJECTION, SOLUTION INTRAVENOUS
Status: CANCELLED | OUTPATIENT
Start: 2019-09-27 | End: 2019-09-28

## 2019-09-27 RX ORDER — ONDANSETRON 4 MG/1
4 TABLET, ORALLY DISINTEGRATING ORAL EVERY 6 HOURS PRN
Status: CANCELLED | OUTPATIENT
Start: 2019-09-27

## 2019-09-27 RX ORDER — NALOXONE HYDROCHLORIDE 0.4 MG/ML
.1-.4 INJECTION, SOLUTION INTRAMUSCULAR; INTRAVENOUS; SUBCUTANEOUS
Status: CANCELLED | OUTPATIENT
Start: 2019-09-27 | End: 2019-09-28

## 2019-09-27 RX ORDER — ONDANSETRON 2 MG/ML
4 INJECTION INTRAMUSCULAR; INTRAVENOUS EVERY 6 HOURS PRN
Status: CANCELLED | OUTPATIENT
Start: 2019-09-27

## 2019-09-27 RX ORDER — FENTANYL CITRATE 50 UG/ML
INJECTION, SOLUTION INTRAMUSCULAR; INTRAVENOUS PRN
Status: DISCONTINUED | OUTPATIENT
Start: 2019-09-27 | End: 2019-09-27 | Stop reason: HOSPADM

## 2019-09-27 ASSESSMENT — ENCOUNTER SYMPTOMS
FEVER: 0
TROUBLE SWALLOWING: 1
COUGH: 0
ABDOMINAL PAIN: 0

## 2019-09-27 NOTE — PROCEDURES
PRE-PROCEDURE H&P    CHIEF COMPLAINT / REASON FOR PROCEDURE:  Food impaction      PRE-SEDATION ASSESSMENT:    Lung Exam:  normal  Heart Exam  RRR, LUIS  Airway Exam: normal  Previous reaction to anesthesia/sedation:   No  Sedation plan based on assessment: Moderate (conscious) sedation  ASA Classification:  3 - Severe systemic disease, but not incapacitating    Comments:     IMPRESSION:  Food impaction    PLAN:  EGD for removal of foreign body      Raegan Mckeon MD  [facility]

## 2019-09-27 NOTE — ED NOTES
Patient states that he took too big a bite of a hot dog a couple of days ago. States that this happens a lot but normally goes away sooner. States he can still feel the pill he took this am.  Refused to change in to gown.

## 2019-09-27 NOTE — ED PROVIDER NOTES
"  History     Chief Complaint:  Swallowed Foreign Body      HPI   Antonio Ramirez is a 74 year old male with a history of gastroesophageal reflux disease and aspiration PNA who presents with Swallowed Foreign Body. Patient ate a hot dog three days ago and has been having trouble swallowing since then. He is able to swallow a \"spits worth\" of soda or water at a time, and is able to continue to take his medications. Patient has been trying to drink Ensure -- he has not had solid food since the hot dog three days ago.  He last had Ensure this morning.This is because he regurgitates anything solid. The patient states that this happens to him often, but the sensation usually goes away sooner. Denies fever, new rashes, hemoptysis, and abdominal pain.    Allergies:  Ciprofloxacin  Hmg-Coa-R Inhibitors      Medications:    Gabapentin   Imodium   Xarelto   Torsemide   Thiamine      Past Medical History:    Alcoholism   Amputated left leg   Anemia   Anticardiolipin antibody syndrome   Antiphospholipid antibody syndrome   Antiplatelet or antithrombotic long-term use   Aortic root dilatation   Aortic stenosis   Arthritis   Coronary artery disease    Chronic systolic congestive heart failure   Gastroesophageal reflux disease   Heart attack   Hiatal hernia   Hyperlipidemia    Hypertension   Ischemic cardiomyopathy   Left foot drop   Liver disease   Low grade B cell lymphoproliferative disorder   Moderate mitral regurgitation   Monoclonal gammopathy   MSSA bacteremia   Pneumonia   Neuropathy   Noninfectious ileitis   Nonrheumatic tricuspid valve regurgitation   Paroxysmal Atrial Fibrillation   Pulmonary embolism   Prostate cancer   Pulmonary hypertension   Renal disease   Syncope   Thrombocytopenia   Urethral stricture   Venous thrombosis     Past Surgical History:    Amputate leg below knee x2   Appendectomy    Apply wound vac   Arthroplasty hip   Arthroplasty knee   CABG  Cholecystectomy    Colonoscopy   Combined cystoscopy, " retrogrades, exchange stent ureters   Craniotomy  Cystoscopy, dilate urethra, combined  Cystoscopy, remove stents, combined   Endoscopic stripping veins   EGD Combined   Cataract surgery   surgery -- prostate   Hernia repair  I&D lower extremity combined x3  IVC Filter placement    Laser holmium lithotripsy ureters, insert stent combined x2  Right knee arthroscopy  Total hip replacement   Elbow surgery     Family History:    Mother - lung cancer  Father - heart failure     Social History:  The patient was accompanied to the ED by his wife.  Smoking Status: Never  Smokeless Tobacco: Never  Alcohol Use: Yes   Marital Status:        Review of Systems   Constitutional: Negative for fever.   HENT: Positive for trouble swallowing.    Respiratory: Negative for cough.    Gastrointestinal: Negative for abdominal pain.   Skin: Negative for rash.   All other systems reviewed and are negative.      Physical Exam     Patient Vitals for the past 24 hrs:   BP Pulse Heart Rate Resp SpO2   09/27/19 0950 125/63 68 68 18 96 %      Physical Exam  Vitals: reviewed by me  General: Pt seen on \[facility] [place] [facility]\"", pleasant, cooperative, and alert to conversation  Eyes: Tracking well, clear conjunctiva BL  ENT: MMM, midline trachea.   Lungs:  No tachypnea, no accessory muscle use. No respiratory distress.   CV: Rate as above, regular rhythm.    Abd: Soft, non tender, no guarding, no rebound. Non distended  MSK: no peripheral edema or joint effusion.  No evidence of trauma  Skin: No rash, normal turgor and temperature  Neuro: Clear speech and no facial droop.  Psych: Not RIS, no e/o AH/VH    Emergency Department Course     Laboratory:  Laboratory findings were communicated with the patient and family who voiced understanding of the findings.    CBC: HGB 9.7 (L) o/w WNL. (WBC 4.2, )   CMP: Anion gap 2 (L), Creatinine 1.60 (H), GFR Estimate 41 (L), Albumin 1.9 (L), Protein Total 6.2 (L) o/w WNL     INR: 3.00 (H)        Procedures:  None     Emergency Department Course:  Past medical records, nursing notes, and vitals reviewed.    1012: I performed an exam of the patient as documented above.     IV was inserted and blood was drawn for laboratory testing, results above.    1058: I spoke with Dr. Mckeon of the Gastroenterology service from [facility] regarding patient's presentation, findings, and plan of care.    Patient went for Endoscopy with Gastroenterology and then returned to the emergency department.     1259: Patient rechecked and updated.      1305: I spoke with Dr. Mckeon of the Gastroenterology service from [facility] regarding patient's presentation, findings, and plan of care.    Findings and plan explained to the Patient and spouse. Patient discharged home with instructions regarding supportive care, medications, and reasons to return. The importance of close follow-up was reviewed.     I personally reviewed the laboratory results with the Patient and spouse and answered all related questions prior to discharge.      Impression & Plan     Medical Decision Making:  Antonio Ramirez is a 74 year old male who presents to the emergency department today with what appears to be an esophageal bolus impaction. Likely a hot dog per the patient's own estimation, and after speaking with the patient as well as Gastroenterology on call, Dr. Mckeon, we were able to expedite the patient to endoscopy for EGD. I am told that this went well. Patient himself states that he feels completely improved and is asking to go home. He is tolerating orals, and while he does have some complex medical issues, these all appear to be chronic and well manage, will disahrge with very clear return to emergency department precautions and follow up with GI, which the patient states he is in support of.     Discharge Diagnosis:    ICD-10-CM    1. Esophageal obstruction due to food impaction K22.2     T18.128A        Disposition:  Discharged to home.     Cristo  Disclosure:  I, Criss Cortez, am serving as a scribe at 10:01 AM on 9/27/2019 to document services personally performed by Dony Jules MD based on my observations and the provider's statements to me.     Criss Cortez  9/27/2019    EMERGENCY DEPARTMENT       Dony Jules MD  09/27/19 5785

## 2019-09-27 NOTE — ED AVS SNAPSHOT
Emergency Department  [address] [place] [address]  Phone:  [phone]  Fax:  [phone]                                    Antonio Ramirez   MRN: [id number]    Department:   Emergency Department   Date of Visit:  9/27/2019           After Visit Summary Signature Page    I have received my discharge instructions, and my questions have been answered. I have discussed any challenges I see with this plan with the nurse or doctor.    ..........................................................................................................................................  Patient/Patient Representative Signature      ..........................................................................................................................................  Patient Representative Print Name and Relationship to Patient    ..................................................               ................................................  Date                                   Time    ..........................................................................................................................................  Reviewed by Signature/Title    ...................................................              ..............................................  Date                                               Time          22EPIC Rev 08/18

## 2019-09-27 NOTE — ED NOTES
Bed: ED27  Expected date: 9/27/19  Expected time: 9:42 AM  Means of arrival:   Comments:  Triage

## 2019-09-27 NOTE — TELEPHONE ENCOUNTER
Caller stating patient took a bite of hotdog 3 days ago and has not been able to swallow solids since then. Patient is taking liquids only. Denies any difficulty breathing.    Per guidelines advised patient to be seen in the Emergency Room. Caller verbalized understanding. Denies further questions.    Anuja Perez RN  [facility]            .Reason for Disposition    [1] Symptoms of blocked esophagus (e.g., can't swallow normal secretions, drooling) AND [2] present now    Additional Information    Negative: [1] Severe difficulty swallowing (e.g., drooling or spitting) AND [2] started suddenly after taking a medicine or allergic food    Negative: Wheezing, stridor, hoarseness, or difficulty breathing    Negative: [1] Swollen tongue AND [2] sudden onset    Negative: Sounds like a life-threatening emergency to the triager    Protocols used: SWALLOWING DIFFICULTY-A-

## 2019-09-28 NOTE — PROGRESS NOTES
Admission Date                   Discharge Date            1/24/17                  Primary Care Physician            Narendra?                  Consultants            Noe thakkar                  Reason for Admission                   History of Present Illness                       This is a 89-year-old male who had a fall versus syncopal episode at home and was found and brought to [facility] for evaluation there is a questionable arrhythmia noted but on further evaluation he is in atrial fib L from sinus rhythm intermittently which is not new listhesis usual state of health. ?He will be evaluated with desire patch and monitoring and he will be changed from Coumadin to Eliquis for ease of use and be followed up Dr. Mackey will do home visits to evaluate patients progress and comfort level with Eliquis. ?His other medical problems are stable including creatinine and potential for ischemia.                           Relevant Physical Findings            Very hard of hearing difficult to get adequate history and conversation                  Relevant Laboratory Findings            Reviewed                  Relevant Radiological Findings                   Procedures Performed            None                  Hospital Course by Problem            As above                  Discharge Diagnosis            Syncope versus fall possible loss of consciousness rule out arrhythmia patient will be placed on his eyepatch with anticoagulation with Eliquis.           Fall risk supportive care using a walker Cane                         Patients Condition at Discharge            At baseline                  Patients Prognosis                   Home Medications                Eliquis oral 5 mg tablet, 5 mg, 1 tab, Oral, Q12H, 3 refills           Tylenol Extra Strength oral 500 mg tablet, 1000 mg, 2 tab, Oral, Q8H, PRN                     Pending Laboratory or Radiology Tests            Zio result?         Rn called due to drainage/blood seeping from wound vac site.  Pt is stable - called house NP to evaluate since ortho in surgery at this time.  NP to see the pt.       NP at bedside - Dressing reinforced with abd & ace.  Leg elevated.  NP to speak with Dr. Coleman & Dr. Casper to see on rounds.              Discharge Diet            As tolerated                  Discharge Activity            As tolerated                  Discharge Disposition            Order additional       20 min in care                   Advance Care Planning                   Follow-up Appointments                        Electronically Signed On 01/24/2017 11:14  __________________________________________________   SON JAUREGUI

## 2019-09-30 ENCOUNTER — TELEPHONE (OUTPATIENT)
Dept: MEDSURG UNIT | Facility: CLINIC | Age: 74
End: 2019-09-30

## 2019-09-30 ENCOUNTER — PATIENT OUTREACH (OUTPATIENT)
Dept: CARE COORDINATION | Facility: CLINIC | Age: 74
End: 2019-09-30

## 2019-09-30 DIAGNOSIS — T18.9XXA SWALLOWED FOREIGN BODY: Primary | ICD-10-CM

## 2019-09-30 NOTE — PROGRESS NOTES
Clinic Care Coordination Contact  [facility]/Voicemail    Referral Source: ED Follow-Up  ED visit 9/27-Swallowed foreign body-Hotdog swallowed 3 days previous relieved by endoscopy   Clinical Data: Care Coordinator Outreach  Outreach attempted x 1.  Left message on patient's voicemail with call back information and requested return call.  Plan: . Care Coordinator will try to reach patient again in 1-2 business days.  Xiao Huston RN, Care Coordinator   [facility] -Care Coordination  [facility]   [phone]

## 2019-09-30 NOTE — LETTER
[facility]ARE COORDINATION  [address]  [place] [address]    October 11, 2019    Antonio Ramirez  [address]   [place] [address]      Dear Antonio,    I have been unsuccessful in reaching you since our last contact. At this time I will make no further attempts to reach you, however this does not change your ability to continue receiving care from your providers at [place]. If you are needing additional support from a care coordinator in the future please contact me at [phone].    All of us at [facility] are invested in your health and are here to assist you in meeting your goals.    Sincerely,    [facility]     Xiao Huston RN Care Coordinator   [facility] / [facility] -[facility] -[facility]   Phone: [phone]  Email :  [email or web address]

## 2019-10-01 ENCOUNTER — HEALTH MAINTENANCE LETTER (OUTPATIENT)
Age: 74
End: 2019-10-01

## 2019-10-01 NOTE — PROGRESS NOTES
Clinic Care Coordination Contact  [facility]/Voicemail    Referral Source: ED Follow-Up    ED visit 9/27-Swallowed foreign body-Hotdog swallowed 3 days previous relieved by Endoscopy     Clinical Data: Care Coordinator Outreach  Outreach attempted x 2.  Left message on patient's voicemail with call back information and requested return call.    Plan: Care Coordinator {will await a return call CC left a message patient has a follow up appointment tomorrow with PCP at 4:30    Xiao Huston RN, Care Coordinator   [facility] -Care Coordination  [facility] [place] , [facility] and [facility]   [phone]

## 2019-10-02 ENCOUNTER — OFFICE VISIT (OUTPATIENT)
Dept: FAMILY MEDICINE | Facility: CLINIC | Age: 74
End: 2019-10-02
Payer: MEDICARE

## 2019-10-02 VITALS
DIASTOLIC BLOOD PRESSURE: 61 MMHG | HEIGHT: 74 IN | BODY MASS INDEX: 31.33 KG/M2 | HEART RATE: 59 BPM | TEMPERATURE: 97 F | SYSTOLIC BLOOD PRESSURE: 116 MMHG

## 2019-10-02 DIAGNOSIS — Z23 NEED FOR PROPHYLACTIC VACCINATION AND INOCULATION AGAINST INFLUENZA: ICD-10-CM

## 2019-10-02 DIAGNOSIS — K22.2 ESOPHAGEAL STENOSIS: Primary | ICD-10-CM

## 2019-10-02 DIAGNOSIS — H69.93 DYSFUNCTION OF BOTH EUSTACHIAN TUBES: ICD-10-CM

## 2019-10-02 DIAGNOSIS — H61.21 IMPACTED CERUMEN OF RIGHT EAR: ICD-10-CM

## 2019-10-02 PROCEDURE — 69210 REMOVE IMPACTED EAR WAX UNI: CPT | Performed by: INTERNAL MEDICINE

## 2019-10-02 PROCEDURE — 90662 IIV NO PRSV INCREASED AG IM: CPT | Performed by: INTERNAL MEDICINE

## 2019-10-02 PROCEDURE — G0008 ADMIN INFLUENZA VIRUS VAC: HCPCS | Performed by: INTERNAL MEDICINE

## 2019-10-02 PROCEDURE — 99213 OFFICE O/P EST LOW 20 MIN: CPT | Mod: 25 | Performed by: INTERNAL MEDICINE

## 2019-10-02 NOTE — PROGRESS NOTES
Subjective     Antonio Ramirez is a 74 year old male who presents to clinic today for the following health issues:    HPI   ED/UC Followup:    Facility:   ED  Date of visit: 9/27/2019  Reason for visit: Esophageal obstruction due to food impaction  Current Status: Patient report no problems associated with his recent visit in ED, reports no pain, ear problem - states that his ears feel plugged       Seen in ER with food impaction  GI did scope and removed impaction and recommended follow up for esophageal problems and chronic liver disease  His swallowing feels better now  He complains of several day history of intermittent plugging sensation moderate in both ears right greater than left without hearing loss or vertigo    Patient Active Problem List   Diagnosis     Alcohol abuse     Alcoholic cirrhosis of liver (H)     Chronic kidney disease, stage III (moderate) (H)     Physical deconditioning     Prostate cancer -- S/P [employer] implants 2000 (no problems since)     Antiphospholipid antibody syndrome (H)     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 (H)     Chronic congestive heart failure, unspecified congestive heart failure type     Thrombocytopenia -- suspect related to alcohol use     Hyperkalemia     Subdural hematoma (H)     Benign essential hypertension     Malabsorption of iron     Calculi, ureter     Anemia     CAD, S/P CABG in 2007     Aortic stenosis     Nonrheumatic mitral valve regurgitation     Nonrheumatic tricuspid valve regurgitation     Pulmonary hypertension (H)     Paroxysmal atrial fibrillation (H)     Ischemic cardiomyopathy     Aortic root dilatation (H)     Venous thrombosis     S/P total hip arthroplasty     S/P Left BKA 2017     Acute cystitis without hematuria     Traumatic hematoma of buttock     Acute blood loss anemia     CRF (chronic renal failure), stage 3 (moderate) (H)     MSSA bacteremia     H/O fall, recent     Hematoma of left lower extremity, subsequent  encounter     Supratherapeutic INR     H/O deep venous thrombosis     History of pulmonary embolism     Essential hypertension     Chronic systolic congestive heart failure (H)     CKD (chronic kidney disease) stage 3, GFR 30-59 ml/min (H)     Phantom limb pain (H)     Debility     PNA (pneumonia)     Past Surgical History:   Procedure Laterality Date     AMPUTATE LEG BELOW KNEE Left 04/2014    related to gangrene, started as foot ulcer related to neuropathy     AMPUTATE LEG BELOW KNEE Left 12/4/2017    Procedure: AMPUTATE LEG BELOW KNEE;  Exploration of Left Leg Below Knee Amputation Stump with Ligation of Bleeders.;  Surgeon: Leandro Arreola MD;  Location:  OR     APPENDECTOMY       APPLY WOUND VAC Left 12/6/2017    Procedure: APPLY WOUND VAC;;  Surgeon: Saima Howard MD;  Location:  SD     ARTHROPLASTY HIP Right 11/27/2018    Procedure: RIGHT TOTAL HIP ARTHROPLASTY;  Surgeon: Saima Howard MD;  Location:  OR     ARTHROPLASTY KNEE Right 9/19/2014    Procedure: ARTHROPLASTY KNEE;  Surgeon: Saima Howard MD;  Location:  OR     CARDIAC SURGERY      CABG     CHOLECYSTECTOMY       COLONOSCOPY       COMBINED CYSTOSCOPY, RETROGRADES, EXCHANGE STENT URETER(S) Left 7/24/2018    Procedure: COMBINED CYSTOSCOPY, RETROGRADES, EXCHANGE STENT URETER(S);  CYSTOSCOPY, BILATERAL RETROGRADES, BILATERAL URETERAL STENT EXCHANGE ;  Surgeon: Matt Cervantes MD;  Location:  OR     CRANIOTOMY Right 4/7/2018    Procedure: CRANIOTOMY;  Right Craniotomy For Subdural Hematoma;  Surgeon: Jono Issa MD;  Location:  OR     CYSTOSCOPY, DILATE URETHRA, COMBINED N/A 10/12/2016    Procedure: COMBINED CYSTOSCOPY, DILATE URETHRA;  Surgeon: Dimitry Bello MD;  Location:  OR     CYSTOSCOPY, REMOVE STENT(S), COMBINED Right 7/24/2018    Procedure: COMBINED CYSTOSCOPY, REMOVE STENT(S);;  Surgeon: Jose Hunter MD;  Location:  OR     ENDOSCOPIC STRIPPING VEIN(S)        esophagogastroduodenoscopy       ESOPHAGOSCOPY, GASTROSCOPY, DUODENOSCOPY (EGD), COMBINED N/A 3/11/2019    Procedure: COMBINED ESOPHAGOSCOPY, GASTROSCOPY, DUODENOSCOPY (EGD), BIOPSY SINGLE OR MULTIPLE;  Surgeon: Katherin Boyd MD;  Location:  GI     ESOPHAGOSCOPY, GASTROSCOPY, DUODENOSCOPY (EGD), COMBINED N/A 9/27/2019    Procedure: ESOPHAGOGASTRODUODENOSCOPY, WITH FOREIGN BODY REMOVAL;  Surgeon: Raegan Mckeon MD;  Location:  GI     EYE SURGERY      cataracts     GENITOURINARY SURGERY      Prostate Seen Implants     HERNIA REPAIR      Umbilical     INCISION AND DRAINAGE LOWER EXTREMITY, COMBINED Left 12/1/2017    Procedure: COMBINED INCISION AND DRAINAGE LOWER EXTREMITY;  INCISION AND DRAINAGE OF LEFT BELOW KNEE AMPUTATION ABSCESS, WOUND VAC PLACEMENT;  Surgeon: Saima Howard MD;  Location:  OR     IR IVC FILTER PLACEMENT       IRRIGATION AND DEBRIDEMENT LOWER EXTREMITY, COMBINED Left 12/6/2017    Procedure: COMBINED IRRIGATION AND DEBRIDEMENT LOWER EXTREMITY;  IRRIGATION AND DEBRIDEMENT OF LEFT BELOW KNEE AMPUTATION SITE WITH WOUND VAC REPLACEMENT;  Surgeon: Saima Howard MD;  Location:  SD     IRRIGATION AND DEBRIDEMENT LOWER EXTREMITY, COMBINED Left 12/8/2017    Procedure: COMBINED IRRIGATION AND DEBRIDEMENT LOWER EXTREMITY;  IRRIGATION AND DEBRIDEMENT OF LEFT BELOW THE KNEE AMPUTATION AND SHORTENING OF THE TIBIA WITH WOUND CLOSURE;  Surgeon: Saima Howard MD;  Location:  OR     LASER HOLMIUM LITHOTRIPSY URETER(S), INSERT STENT, COMBINED Bilateral 6/28/2018    Procedure: COMBINED CYSTOSCOPY, URETEROSCOPY, LASER HOLMIUM LITHOTRIPSY URETER(S), INSERT STENT;  CYSTOSCOPY, BILATERAL URETEROSCOPY,  HOLMIUM LASER LITHOTRIPSY, BILATERAL STENT PLACEMENT, STONE BASKETING;  Surgeon: Jose Hunter MD;  Location:  OR     LASER HOLMIUM LITHOTRIPSY URETER(S), INSERT STENT, COMBINED Left 8/14/2018    Procedure: COMBINED CYSTOSCOPY, URETEROSCOPY, LASER HOLMIUM LITHOTRIPSY  "URETER(S), INSERT STENT;  CYSTOSCOPY, LEFT URETEROSCOPY, LEFT LASER HOLMIUM LITHOTRIPSY URETER(S) REMOVAL BILATERAL STENTS;  Surgeon: Jose Hunter MD;  Location:  OR     ORTHOPEDIC SURGERY      (R) knee scope     ORTHOPEDIC SURGERY      total hip      ORTHOPEDIC SURGERY      elbow surgery       Social History     Tobacco Use     Smoking status: Never Smoker     Smokeless tobacco: Never Used   Substance Use Topics     Alcohol use: Yes     Comment: quit 10/2015; now abt 1-2 vodka's per night     Family History   Problem Relation Age of Onset     Lung Cancer Mother      Heart Failure Father          age 87         Current Outpatient Medications   Medication Sig Dispense Refill     gabapentin (NEURONTIN) 600 MG tablet Take 600 mg by mouth 2 times daily       loperamide (IMODIUM) 2 MG capsule Take 4 mg by mouth as needed for diarrhea       rivaroxaban ANTICOAGULANT (XARELTO) 15 MG TABS tablet Take 1 tablet (15 mg) by mouth daily (with dinner)       torsemide (DEMADEX) 20 MG tablet Take 20 mg by mouth 2 times daily        multivitamin, therapeutic (THERA-VIT) TABS tablet Take 1 tablet by mouth daily (Patient not taking: Reported on 10/2/2019)       vitamin B1 (THIAMINE) 100 MG tablet Take 1 tablet (100 mg) by mouth daily (Patient not taking: Reported on 10/2/2019)       Allergies   Allergen Reactions     Ciprofloxacin Itching     Severe itching \"like ants were crawling\"     Hmg-Coa-R Inhibitors Other (See Comments)     Rhabdomyolysis occurred within a couple days       Reviewed and updated as needed this visit by Provider         Review of Systems   ROS COMP: Constitutional, HEENT, cardiovascular, pulmonary, gi and gu systems are negative, except as otherwise noted.      Objective    /61 (BP Location: Right arm, Patient Position: Sitting, Cuff Size: Adult Regular)   Pulse 59   Temp 97  F (36.1  C) (Oral)   Ht 1.88 m (6' 2\")   BMI 31.33 kg/m    Body mass index is 31.33 kg/m .  Physical Exam "   General: Chronically ill-appearing man in no acute distress.  HEENT: Large cerumen impaction in the right external canal removed by Dr. Hardy using a plastic curette, following cerumen impaction removal, there is a normal right tympanic membrane, left external canal and tympanic membrane are normal.            Assessment & Plan     1. Esophageal stenosis  Referred to gastroenterology for further evaluation of esophageal stenosis and dysphasia as well as management of chronic liver disease  - GASTROENTEROLOGY ADULT REF CONSULT ONLY    2. Impacted cerumen of right ear  Cerumen impaction removed from right external canal by Dr. Hardy using a plastic curette    3. Dysfunction of both eustachian tubes  Discussed trial of Flonase and over-the-counter loratadine 10 mg once daily    4. Need for prophylactic vaccination and inoculation against influenza    - INFLUENZA (HIGH DOSE) 3 VALENT VACCINE [98365]  - ADMIN INFLUENZA (For MEDICARE Patients ONLY) []               Return in about 3 months (around 1/2/2020).    Main Hardy MD  [facility]

## 2019-10-03 ENCOUNTER — OFFICE VISIT (OUTPATIENT)
Dept: CARDIOLOGY | Facility: CLINIC | Age: 74
End: 2019-10-03
Payer: MEDICARE

## 2019-10-03 VITALS
DIASTOLIC BLOOD PRESSURE: 71 MMHG | BODY MASS INDEX: 31.44 KG/M2 | SYSTOLIC BLOOD PRESSURE: 120 MMHG | HEIGHT: 74 IN | HEART RATE: 81 BPM | WEIGHT: 245 LBS

## 2019-10-03 DIAGNOSIS — I35.0 AORTIC VALVE STENOSIS, ETIOLOGY OF CARDIAC VALVE DISEASE UNSPECIFIED: ICD-10-CM

## 2019-10-03 DIAGNOSIS — I46.9 CARDIAC ARREST (H): Primary | ICD-10-CM

## 2019-10-03 PROCEDURE — 99214 OFFICE O/P EST MOD 30 MIN: CPT | Performed by: INTERNAL MEDICINE

## 2019-10-03 RX ORDER — ASPIRIN 81 MG/1
81 TABLET ORAL EVERY EVENING
Status: ON HOLD | COMMUNITY
End: 2020-01-01

## 2019-10-03 ASSESSMENT — MIFFLIN-ST. JEOR: SCORE: 1911.06

## 2019-10-03 NOTE — PROGRESS NOTES
"HPI and Plan:     Mr. Antonio Ramirez was seen in follow-up at [facility] in [place].  He is 74 years old, has a complex medical history and was referred last year by Dr. Hardy to establish care.  Until then, he had been followed through [facility] by Dr. Box. He was last seen about 10 months ago and has been hospitalized and to the ED multiple times over the ten months.  His wife accompanied him.    Mr. Ramirez i denies dyspnea or chest discomfort or palpitations. He has persisten tbilateral lymphedema and admits that he did not want to wrap his legs as previously recommended by [facility], uses his Velcro wraps intermittently and does not use the leg pump previously prescribed in [place].  He admits that he is largely sedentary.  He is not interested in ablation and he has been noted to be hypoproteinemic.    He has a history of coronary artery disease (CAD), prior coronary artery bypass surgery (CABG),  antiphospholipid syndrome, \"alcoholic\" cirrhosis, moderate mitral regurgitation, non-Hodgkin's (large B-cell) lymphoma, and persistent atrial fibrillation. He also has chronic kidney disease and anemia as well as thrombocytopenia.  He has had falls and experienced a subdural hematoma and craniotomy. He has nephrolithiasis and has had ureteral stenting. Last year, he experienced sepsis with a urinary tract infection.      Several months ago, he was admitted to the hospital during June 2019 with weakness and had evidence of sepsis and pneumonia, possibly the result of aspiration.  He has two \"cardiac\" arrests - the first was PEA (pulseless electrical activity) and the second was ventricular tachycardia.  Though not fully clear from the notes, he was seen by Dr. Brooks of the ICU service and \"shock\" was mentioned as a possible etiology.  His wife had questions as to why he had those events.  He denies near-syncope or syncope.    He has atrial fibrillation but he never had palpitations. " His electrocardiograms have repeatedly shown atrial fibrillation. He is on Xarelto for his history of pulmonary embolism so he is anticoagulated.  Mr. Ramirez denies any shortness of breath or chest discomfort. His activity tolerance is limited by the edema in his left below the knee amputation (apparently from gangrene) which causes discomfort with the prosthesis.  He has a history of an LAD stent in 2007, followed by urgent bypass later that same year. He denies any recent chest pain.  He had been chronically anticoagulated with warfarin secondary to lower extremity thrombosis in 2007 with placement of an IVC filter. His filter thrombosed as well, and he was eventually diagnosed with antiphospholipid antibody syndrome. He is on Xarelto 20 mg daily.     A stress test was performed several years ago in [place] and he was reportedly told there was no evidence of ischemia. His ejection fraction at that time was 47%. Echocardiography a week ago has demonstrated a stable ejection fraction between 40 to 45%, 1 to 2+ mitral regurgitation and mild tricuspid regurgitation. The last echo indicated aortic root enlargement to 4 cm and the ascending aorta to be 4.3 cm.  There was aortic stenosos with an DIVINA of 1.5cm2 (18 mmHg mean).  There was severe pulmonary hypertension and evidence of RV pressure/volume overload.    Exam:  This is a man with apparent lymphedema and ascites sitting comfortably in a wheelchair.  Blood pressure 120/71 mmHg, pulse 71 bpm.   Chest was clear.   On cardiac exam notable for irregularly irregular S1, S2 and only a 1/6 LUIS at LSB.    Assessment/Plan:  Mr. Ramirez has a very complex set of medical problems - many of which are interrelated.  They include,    1. coronary artery disease S/P CABG in 2007,  2. atrial fibrillation, asymptomatic and likely permanent,  3. ischemic cardiomyopathy, mild to moderate (EF 40-45%),  4. mitral insufficiency, improved,  5. tricuspid insufficiency, improved,  6.  pulmonary hypertension, likely multifactorial and severe  7. aortic root dilation, mild,  8. aortic stenosis, moderate.    With regard to the atrial fibrillation, he is asymptomatic and appropriately on anticoagulation (especially given his history of pulmonary emboli).  He does have a fall risk and history of a subdural head bleed after a fall. He is on low dose beta blockade and AV ama blockade without evidence of tachycardia. A future Holter can be considered at a return visit to assess heart rate variability.    With regard to his CAD and prior CABG, he is asymptomatic and on appropriate risk factor intervention. Statin therapy has been discontinued given his intolerance. A PCSK 9 inhibitor is an option. There is not hepatic clearance of the PCSK 9 inhibitors (proteolysis and binding to PCSK elimination) so he remains a candidate for Repatha. He is on low dose aspirin and beta blockade.  He is not on an ACEI or ARB given his chronic kidney disease.  I have recommended a Lexiscan stress nuclear study given the hospital events of June 2019.      With regard to his cardiomyopathy, he has no congestive heart failure (CHF) symptoms.  He is on carvedilol but not a vasodilator (as described above).  Given his edema, hydralazine/nitrates may cause more adverse symptoms than benefits.    With regard to his mitral insufficiency, he is asymptomatic and it has improved from past assessments.       His pulmonary hypertension is likely multifactorial and the result of his prior pulmonary embolism, cirrhosis and possibly undiagnosed sleep apnea and/or left heart disease.  In future, a sleep study may be of benefit.    The mild aortic root dilation will be followed echocardiography.  The dimensions are smaller on the recent echo as compared to the prior echocardiogram.    With regard to the aortic stenosis, it is mild and the mean gradient is only 18 mmHg. It will also be followed by echocardiography to be repeated in one  "year.    He has other contributory problems including chronic kidney disease, falls, arthritis, peripheral neuropathy, nephrolithiasis, a history of bilateral and saddle pulmonary emboli in 2006, an inferior vena cava (IVC) filter and more recently recurrent lymphedema.      Again, the  lymphedema is also likely multifactorial and the result of CKD, low oncotic pressure associated with hypoproteinemia and liver disease, impaired mobility, an inferior vena cava filter and anemia. He is not interested in pursuing recommended therapies.    A follow-up with the [facility] to review the results of the stress study was recommended. In the absence of coronary ischemia, a referral to Pulmonary hypertension is also recommended.  No orders of the defined types were placed in this encounter.      Orders Placed This Encounter   Medications     aspirin 81 MG EC tablet     Sig: Take 81 mg by mouth daily       There are no discontinued medications.      No diagnosis found.    CURRENT MEDICATIONS:  Current Outpatient Medications   Medication Sig Dispense Refill     aspirin 81 MG EC tablet Take 81 mg by mouth daily       gabapentin (NEURONTIN) 600 MG tablet Take 600 mg by mouth 2 times daily       loperamide (IMODIUM) 2 MG capsule Take 4 mg by mouth as needed for diarrhea       multivitamin, therapeutic (THERA-VIT) TABS tablet Take 1 tablet by mouth daily       rivaroxaban ANTICOAGULANT (XARELTO) 15 MG TABS tablet Take 1 tablet (15 mg) by mouth daily (with dinner)       torsemide (DEMADEX) 20 MG tablet Take 20 mg by mouth 2 times daily        vitamin B1 (THIAMINE) 100 MG tablet Take 1 tablet (100 mg) by mouth daily         ALLERGIES     Allergies   Allergen Reactions     Ciprofloxacin Itching     Severe itching \"like ants were crawling\"     Hmg-Coa-R Inhibitors Other (See Comments)     Rhabdomyolysis occurred within a couple days       PAST MEDICAL HISTORY:  Past Medical History:   Diagnosis Date     Alcoholism (H)      Amputated left leg " (H)      Anemia      Anticardiolipin antibody syndrome (H)      Antiphospholipid antibody syndrome (H)      Antiplatelet or antithrombotic long-term use      Aortic root dilatation (H)      Aortic stenosis      Arthritis      Balance problem      Coronary artery disease     CABG 2007: SVG to LAD, SVG to diagonal, SVG to OM; cardiac cath 5/17/18: thrombectomy for restenosis of stents-sent for urgent CABG, cath 5/14/2007: GRECIA x2 to LAD, Grecia to diagonal     Gastro-oesophageal reflux disease      Heart attack (H) 2007    apparently arrested, cardiac X5     Hiatal hernia      Hypercholesterolemia      Hypertension      Ischemic cardiomyopathy      Left foot drop      Liver disease     alcoholic liver disease, alcoholic cirrohsosis, portal hypertension     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)     ?When diagnosed, patient not aware of this     Moderate mitral regurgitation      Monoclonal gammopathy      Neuropathy      Noninfectious ileitis     diverticulitis of colon     Nonrheumatic tricuspid valve regurgitation      Paroxysmal atrial fibrillation (H)      PE (pulmonary embolism) 2006    saddle emboli 2006     Prostate cancer (H) 2000    Treated with radiation (seed implants in 2000) -- no problems since     Pulmonary hypertension (H)      Renal disease     kidney stones     Syncope      Thrombocytopenia (H)      Urethral stricture      Venous thrombosis     IVC filter and lysis procedures 2007       PAST SURGICAL HISTORY:  Past Surgical History:   Procedure Laterality Date     AMPUTATE LEG BELOW KNEE Left 04/2014    related to gangrene, started as foot ulcer related to neuropathy     AMPUTATE LEG BELOW KNEE Left 12/4/2017    Procedure: AMPUTATE LEG BELOW KNEE;  Exploration of Left Leg Below Knee Amputation Stump with Ligation of Bleeders.;  Surgeon: Leandro Arreola MD;  Location: [facility]     APPENDECTOMY       APPLY WOUND VAC Left 12/6/2017    Procedure: APPLY WOUND VAC;;  Surgeon: Saima Howard MD;   Location:  [facility]     ARTHROPLASTY HIP Right 11/27/2018    Procedure: RIGHT TOTAL HIP ARTHROPLASTY;  Surgeon: Saima Howard MD;  Location:  OR     ARTHROPLASTY KNEE Right 9/19/2014    Procedure: ARTHROPLASTY KNEE;  Surgeon: Saima Howard MD;  Location:  OR     CARDIAC SURGERY      CABG     CHOLECYSTECTOMY       COLONOSCOPY       COMBINED CYSTOSCOPY, RETROGRADES, EXCHANGE STENT URETER(S) Left 7/24/2018    Procedure: COMBINED CYSTOSCOPY, RETROGRADES, EXCHANGE STENT URETER(S);  CYSTOSCOPY, BILATERAL RETROGRADES, BILATERAL URETERAL STENT EXCHANGE ;  Surgeon: Matt Cervantes MD;  Location:  OR     CRANIOTOMY Right 4/7/2018    Procedure: CRANIOTOMY;  Right Craniotomy For Subdural Hematoma;  Surgeon: Jono Issa MD;  Location:  OR     CYSTOSCOPY, DILATE URETHRA, COMBINED N/A 10/12/2016    Procedure: COMBINED CYSTOSCOPY, DILATE URETHRA;  Surgeon: Dimitry Bello MD;  Location:  OR     CYSTOSCOPY, REMOVE STENT(S), COMBINED Right 7/24/2018    Procedure: COMBINED CYSTOSCOPY, REMOVE STENT(S);;  Surgeon: Jose Hunter MD;  Location:  OR     ENDOSCOPIC STRIPPING VEIN(S)       esophagogastroduodenoscopy       ESOPHAGOSCOPY, GASTROSCOPY, DUODENOSCOPY (EGD), COMBINED N/A 3/11/2019    Procedure: COMBINED ESOPHAGOSCOPY, GASTROSCOPY, DUODENOSCOPY (EGD), BIOPSY SINGLE OR MULTIPLE;  Surgeon: Katherin Boyd MD;  Location:  GI     ESOPHAGOSCOPY, GASTROSCOPY, DUODENOSCOPY (EGD), COMBINED N/A 9/27/2019    Procedure: ESOPHAGOGASTRODUODENOSCOPY, WITH FOREIGN BODY REMOVAL;  Surgeon: Raegan Mckeon MD;  Location:  GI     EYE SURGERY      cataracts     GENITOURINARY SURGERY      Prostate Seen Implants     HERNIA REPAIR      Umbilical     INCISION AND DRAINAGE LOWER EXTREMITY, COMBINED Left 12/1/2017    Procedure: COMBINED INCISION AND DRAINAGE LOWER EXTREMITY;  INCISION AND DRAINAGE OF LEFT BELOW KNEE AMPUTATION ABSCESS, WOUND VAC PLACEMENT;  Surgeon: Saima Howard,  MD;  Location:  OR     IR IVC FILTER PLACEMENT       IRRIGATION AND DEBRIDEMENT LOWER EXTREMITY, COMBINED Left 2017    Procedure: COMBINED IRRIGATION AND DEBRIDEMENT LOWER EXTREMITY;  IRRIGATION AND DEBRIDEMENT OF LEFT BELOW KNEE AMPUTATION SITE WITH WOUND VAC REPLACEMENT;  Surgeon: Saima Howard MD;  Location:  [facility]     IRRIGATION AND DEBRIDEMENT LOWER EXTREMITY, COMBINED Left 2017    Procedure: COMBINED IRRIGATION AND DEBRIDEMENT LOWER EXTREMITY;  IRRIGATION AND DEBRIDEMENT OF LEFT BELOW THE KNEE AMPUTATION AND SHORTENING OF THE TIBIA WITH WOUND CLOSURE;  Surgeon: Saima Howard MD;  Location:  OR     LASER HOLMIUM LITHOTRIPSY URETER(S), INSERT STENT, COMBINED Bilateral 2018    Procedure: COMBINED CYSTOSCOPY, URETEROSCOPY, LASER HOLMIUM LITHOTRIPSY URETER(S), INSERT STENT;  CYSTOSCOPY, BILATERAL URETEROSCOPY,  HOLMIUM LASER LITHOTRIPSY, BILATERAL STENT PLACEMENT, STONE BASKETING;  Surgeon: Jose Hunter MD;  Location:  OR     LASER HOLMIUM LITHOTRIPSY URETER(S), INSERT STENT, COMBINED Left 2018    Procedure: COMBINED CYSTOSCOPY, URETEROSCOPY, LASER HOLMIUM LITHOTRIPSY URETER(S), INSERT STENT;  CYSTOSCOPY, LEFT URETEROSCOPY, LEFT LASER HOLMIUM LITHOTRIPSY URETER(S) REMOVAL BILATERAL STENTS;  Surgeon: Jose Hunter MD;  Location:  OR     ORTHOPEDIC SURGERY      (R) knee scope     ORTHOPEDIC SURGERY      total hip      ORTHOPEDIC SURGERY      elbow surgery       FAMILY HISTORY:  Family History   Problem Relation Age of Onset     Lung Cancer Mother      Heart Failure Father          age 87       SOCIAL HISTORY:  Social History     Socioeconomic History     Marital status:      Spouse name: None     Number of children: 2     Years of education: None     Highest education level: None   Occupational History     Occupation: Retired    Social Needs     Financial resource strain: None     Food insecurity:     Worry: None     Inability: None      "Transportation needs:     Medical: None     Non-medical: None   Tobacco Use     Smoking status: Never Smoker     Smokeless tobacco: Never Used   Substance and Sexual Activity     Alcohol use: Yes     Comment: quit 10/2015; now abt 1-2 vodka's per night     Drug use: No     Sexual activity: Not Currently     Partners: Female   Lifestyle     Physical activity:     Days per week: None     Minutes per session: None     Stress: None   Relationships     Social connections:     Talks on phone: None     Gets together: None     Attends Rastafari service: None     Active member of club or organization: None     Attends meetings of clubs or organizations: None     Relationship status: None     Intimate partner violence:     Fear of current or ex partner: None     Emotionally abused: None     Physically abused: None     Forced sexual activity: None   Other Topics Concern     Parent/sibling w/ CABG, MI or angioplasty before 65F 55M? Not Asked   Social History Narrative    , 2 children, retired . He does not have a living will but desires full code.  (last updated 2/28/2019)        Review of Systems:  Skin:  Negative       Eyes:  Negative      ENT:  Negative      Respiratory:  Negative       Cardiovascular:  Negative      Gastroenterology: Negative      Genitourinary:  not assessed      Musculoskeletal:  Positive for muscular weakness lower extremities  Neurologic:  Negative      Psychiatric:  Positive for sleep disturbances    Heme/Lymph/Imm:  Negative      Endocrine:  Negative        Physical Exam:  Vitals: /71   Pulse 81   Ht 1.88 m (6' 2\")   Wt 111.1 kg (245 lb)   BMI 31.46 kg/m      Constitutional:  cooperative chronically ill      Skin:  warm and dry to the touch          Head:  normocephalic        Eyes:  sclera white        Lymph:      ENT:           Neck:           Respiratory:  normal breath sounds, clear to auscultation, normal A-P diameter, normal symmetry, normal respiratory excursion, no " use of accessory muscles         Cardiac: normal S1 and S2;no S3 or S4       grade 2;systolic murmur                                                 GI:           Extremities and Muscular Skeletal:            wrapped with vecro compression on right, left BKA.    Neurological:      left BKA amputation    Psych:  Alert and Oriented x 3;affect appropriate, oriented to time, person and place          CC  No referring provider defined for this encounter.

## 2019-10-03 NOTE — LETTER
"10/3/2019    Main Hardy MD  [address] [facility] [address]  [facility] [address]    RE: Antonio Ramirez       Dear Colleague,    I had the pleasure of seeing Antonio Ramirez in the [facility].    HPI and Plan:     Mr. Antonio Ramirez was seen in follow-up at [facility] in [place].  He is 74 years old, has a complex medical history and was referred last year by Dr. Hardy to establish care.  Until then, he had been followed through [facility] by Dr. Box. He was last seen about 10 months ago and has been hospitalized and to the ED multiple times over the ten months.  His wife accompanied him.    Mr. Ramirez i denies dyspnea or chest discomfort or palpitations. He has persisten tbilateral lymphedema and admits that he did not want to wrap his legs as previously recommended by [facility], uses his Velcro wraps intermittently and does not use the leg pump previously prescribed in [place].  He admits that he is largely sedentary.  He is not interested in ablation and he has been noted to be hypoproteinemic.    He has a history of coronary artery disease (CAD), prior coronary artery bypass surgery (CABG),  antiphospholipid syndrome, \"alcoholic\" cirrhosis, moderate mitral regurgitation, non-Hodgkin's (large B-cell) lymphoma, and persistent atrial fibrillation. He also has chronic kidney disease and anemia as well as thrombocytopenia.  He has had falls and experienced a subdural hematoma and craniotomy. He has nephrolithiasis and has had ureteral stenting. Last year, he experienced sepsis with a urinary tract infection.      Several months ago, he was admitted to the hospital during June 2019 with weakness and had evidence of sepsis and pneumonia, possibly the result of aspiration.  He has two \"cardiac\" arrests - the first was PEA (pulseless electrical activity) and the second was ventricular tachycardia.  Though not fully clear from the notes, he was seen by Dr. Brooks " "of the ICU service and \"shock\" was mentioned as a possible etiology.  His wife had questions as to why he had those events.  He denies near-syncope or syncope.    He has atrial fibrillation but he never had palpitations. His electrocardiograms have repeatedly shown atrial fibrillation. He is on Xarelto for his history of pulmonary embolism so he is anticoagulated.  Mr. Ramirez denies any shortness of breath or chest discomfort. His activity tolerance is limited by the edema in his left below the knee amputation (apparently from gangrene) which causes discomfort with the prosthesis.  He has a history of an LAD stent in 2007, followed by urgent bypass later that same year. He denies any recent chest pain.  He had been chronically anticoagulated with warfarin secondary to lower extremity thrombosis in 2007 with placement of an IVC filter. His filter thrombosed as well, and he was eventually diagnosed with antiphospholipid antibody syndrome. He is on Xarelto 20 mg daily.     A stress test was performed several years ago in [place] and he was reportedly told there was no evidence of ischemia. His ejection fraction at that time was 47%. Echocardiography a week ago has demonstrated a stable ejection fraction between 40 to 45%, 1 to 2+ mitral regurgitation and mild tricuspid regurgitation. The last echo indicated aortic root enlargement to 4 cm and the ascending aorta to be 4.3 cm.  There was aortic stenosos with an DIVINA of 1.5cm2 (18 mmHg mean).  There was severe pulmonary hypertension and evidence of RV pressure/volume overload.    Exam:  This is a man with apparent lymphedema and ascites sitting comfortably in a wheelchair.  Blood pressure 120/71 mmHg, pulse 71 bpm.   Chest was clear.   On cardiac exam notable for irregularly irregular S1, S2 and only a 1/6 LUIS at LSB.    Assessment/Plan:  Mr. Ramirez has a very complex set of medical problems - many of which are interrelated.  They include,    1. coronary artery disease " S/P CABG in 2007,  2. atrial fibrillation, asymptomatic and likely permanent,  3. ischemic cardiomyopathy, mild to moderate (EF 40-45%),  4. mitral insufficiency, improved,  5. tricuspid insufficiency, improved,  6. pulmonary hypertension, likely multifactorial and severe  7. aortic root dilation, mild,  8. aortic stenosis, moderate.    With regard to the atrial fibrillation, he is asymptomatic and appropriately on anticoagulation (especially given his history of pulmonary emboli).  He does have a fall risk and history of a subdural head bleed after a fall. He is on low dose beta blockade and AV ama blockade without evidence of tachycardia. A future Holter can be considered at a return visit to assess heart rate variability.    With regard to his CAD and prior CABG, he is asymptomatic and on appropriate risk factor intervention. Statin therapy has been discontinued given his intolerance. A PCSK 9 inhibitor is an option. There is not hepatic clearance of the PCSK 9 inhibitors (proteolysis and binding to PCSK elimination) so he remains a candidate for Repatha. He is on low dose aspirin and beta blockade.  He is not on an ACEI or ARB given his chronic kidney disease.  I have recommended a Lexiscan stress nuclear study given the hospital events of June 2019.      With regard to his cardiomyopathy, he has no congestive heart failure (CHF) symptoms.  He is on carvedilol but not a vasodilator (as described above).  Given his edema, hydralazine/nitrates may cause more adverse symptoms than benefits.    With regard to his mitral insufficiency, he is asymptomatic and it has improved from past assessments.       His pulmonary hypertension is likely multifactorial and the result of his prior pulmonary embolism, cirrhosis and possibly undiagnosed sleep apnea and/or left heart disease.  In future, a sleep study may be of benefit.    The mild aortic root dilation will be followed echocardiography.  The dimensions are smaller on  "the recent echo as compared to the prior echocardiogram.    With regard to the aortic stenosis, it is mild and the mean gradient is only 18 mmHg. It will also be followed by echocardiography to be repeated in one year.    He has other contributory problems including chronic kidney disease, falls, arthritis, peripheral neuropathy, nephrolithiasis, a history of bilateral and saddle pulmonary emboli in 2006, an inferior vena cava (IVC) filter and more recently recurrent lymphedema.      Again, the  lymphedema is also likely multifactorial and the result of CKD, low oncotic pressure associated with hypoproteinemia and liver disease, impaired mobility, an inferior vena cava filter and anemia. He is not interested in pursuing recommended therapies.    A follow-up with the [facility] to review the results of the stress study was recommended. In the absence of coronary ischemia, a referral to Pulmonary hypertension is also recommended.  No orders of the defined types were placed in this encounter.      Orders Placed This Encounter   Medications     aspirin 81 MG EC tablet     Sig: Take 81 mg by mouth daily       There are no discontinued medications.      No diagnosis found.    CURRENT MEDICATIONS:  Current Outpatient Medications   Medication Sig Dispense Refill     aspirin 81 MG EC tablet Take 81 mg by mouth daily       gabapentin (NEURONTIN) 600 MG tablet Take 600 mg by mouth 2 times daily       loperamide (IMODIUM) 2 MG capsule Take 4 mg by mouth as needed for diarrhea       multivitamin, therapeutic (THERA-VIT) TABS tablet Take 1 tablet by mouth daily       rivaroxaban ANTICOAGULANT (XARELTO) 15 MG TABS tablet Take 1 tablet (15 mg) by mouth daily (with dinner)       torsemide (DEMADEX) 20 MG tablet Take 20 mg by mouth 2 times daily        vitamin B1 (THIAMINE) 100 MG tablet Take 1 tablet (100 mg) by mouth daily         ALLERGIES     Allergies   Allergen Reactions     Ciprofloxacin Itching     Severe itching \"like ants were " "crawling\"     Hmg-Coa-R Inhibitors Other (See Comments)     Rhabdomyolysis occurred within a couple days       PAST MEDICAL HISTORY:  Past Medical History:   Diagnosis Date     Alcoholism (H)      Amputated left leg (H)      Anemia      Anticardiolipin antibody syndrome (H)      Antiphospholipid antibody syndrome (H)      Antiplatelet or antithrombotic long-term use      Aortic root dilatation (H)      Aortic stenosis      Arthritis      Balance problem      Coronary artery disease     CABG 2007: SVG to LAD, SVG to diagonal, SVG to OM; cardiac cath 5/17/18: thrombectomy for restenosis of stents-sent for urgent CABG, cath 5/14/2007: GRECIA x2 to LAD, Grecia to diagonal     Gastro-oesophageal reflux disease      Heart attack (H) 2007    apparently arrested, cardiac X5     Hiatal hernia      Hypercholesterolemia      Hypertension      Ischemic cardiomyopathy      Left foot drop      Liver disease     alcoholic liver disease, alcoholic cirrohsosis, portal hypertension     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)     ?When diagnosed, patient not aware of this     Moderate mitral regurgitation      Monoclonal gammopathy      Neuropathy      Noninfectious ileitis     diverticulitis of colon     Nonrheumatic tricuspid valve regurgitation      Paroxysmal atrial fibrillation (H)      PE (pulmonary embolism) 2006    saddle emboli 2006     Prostate cancer (H) 2000    Treated with radiation (seed implants in 2000) -- no problems since     Pulmonary hypertension (H)      Renal disease     kidney stones     Syncope      Thrombocytopenia (H)      Urethral stricture      Venous thrombosis     IVC filter and lysis procedures 2007       PAST SURGICAL HISTORY:  Past Surgical History:   Procedure Laterality Date     AMPUTATE LEG BELOW KNEE Left 04/2014    related to gangrene, started as foot ulcer related to neuropathy     AMPUTATE LEG BELOW KNEE Left 12/4/2017    Procedure: AMPUTATE LEG BELOW KNEE;  Exploration of Left Leg Below " Knee Amputation Stump with Ligation of Bleeders.;  Surgeon: Leandro Arreola MD;  Location:  OR     APPENDECTOMY       APPLY WOUND VAC Left 12/6/2017    Procedure: APPLY WOUND VAC;;  Surgeon: Saima Howard MD;  Location:  SD     ARTHROPLASTY HIP Right 11/27/2018    Procedure: RIGHT TOTAL HIP ARTHROPLASTY;  Surgeon: Saima Howard MD;  Location:  OR     ARTHROPLASTY KNEE Right 9/19/2014    Procedure: ARTHROPLASTY KNEE;  Surgeon: Saima Howard MD;  Location:  OR     CARDIAC SURGERY      CABG     CHOLECYSTECTOMY       COLONOSCOPY       COMBINED CYSTOSCOPY, RETROGRADES, EXCHANGE STENT URETER(S) Left 7/24/2018    Procedure: COMBINED CYSTOSCOPY, RETROGRADES, EXCHANGE STENT URETER(S);  CYSTOSCOPY, BILATERAL RETROGRADES, BILATERAL URETERAL STENT EXCHANGE ;  Surgeon: Matt Cervantes MD;  Location:  OR     CRANIOTOMY Right 4/7/2018    Procedure: CRANIOTOMY;  Right Craniotomy For Subdural Hematoma;  Surgeon: Jono Issa MD;  Location:  OR     CYSTOSCOPY, DILATE URETHRA, COMBINED N/A 10/12/2016    Procedure: COMBINED CYSTOSCOPY, DILATE URETHRA;  Surgeon: Dimitry Bello MD;  Location:  OR     CYSTOSCOPY, REMOVE STENT(S), COMBINED Right 7/24/2018    Procedure: COMBINED CYSTOSCOPY, REMOVE STENT(S);;  Surgeon: Jose Hunter MD;  Location:  OR     ENDOSCOPIC STRIPPING VEIN(S)       esophagogastroduodenoscopy       ESOPHAGOSCOPY, GASTROSCOPY, DUODENOSCOPY (EGD), COMBINED N/A 3/11/2019    Procedure: COMBINED ESOPHAGOSCOPY, GASTROSCOPY, DUODENOSCOPY (EGD), BIOPSY SINGLE OR MULTIPLE;  Surgeon: Katherin Boyd MD;  Location:  GI     ESOPHAGOSCOPY, GASTROSCOPY, DUODENOSCOPY (EGD), COMBINED N/A 9/27/2019    Procedure: ESOPHAGOGASTRODUODENOSCOPY, WITH FOREIGN BODY REMOVAL;  Surgeon: Raegan Mckeon MD;  Location:  GI     EYE SURGERY      cataracts     GENITOURINARY SURGERY      Prostate Seen Implants     HERNIA REPAIR      Umbilical     INCISION AND  DRAINAGE LOWER EXTREMITY, COMBINED Left 2017    Procedure: COMBINED INCISION AND DRAINAGE LOWER EXTREMITY;  INCISION AND DRAINAGE OF LEFT BELOW KNEE AMPUTATION ABSCESS, WOUND VAC PLACEMENT;  Surgeon: Saima Howard MD;  Location:  OR     IR IVC FILTER PLACEMENT       IRRIGATION AND DEBRIDEMENT LOWER EXTREMITY, COMBINED Left 2017    Procedure: COMBINED IRRIGATION AND DEBRIDEMENT LOWER EXTREMITY;  IRRIGATION AND DEBRIDEMENT OF LEFT BELOW KNEE AMPUTATION SITE WITH WOUND VAC REPLACEMENT;  Surgeon: Saima Howard MD;  Location:  SD     IRRIGATION AND DEBRIDEMENT LOWER EXTREMITY, COMBINED Left 2017    Procedure: COMBINED IRRIGATION AND DEBRIDEMENT LOWER EXTREMITY;  IRRIGATION AND DEBRIDEMENT OF LEFT BELOW THE KNEE AMPUTATION AND SHORTENING OF THE TIBIA WITH WOUND CLOSURE;  Surgeon: Saima Howard MD;  Location:  OR     LASER HOLMIUM LITHOTRIPSY URETER(S), INSERT STENT, COMBINED Bilateral 2018    Procedure: COMBINED CYSTOSCOPY, URETEROSCOPY, LASER HOLMIUM LITHOTRIPSY URETER(S), INSERT STENT;  CYSTOSCOPY, BILATERAL URETEROSCOPY,  HOLMIUM LASER LITHOTRIPSY, BILATERAL STENT PLACEMENT, STONE BASKETING;  Surgeon: Jose Hunter MD;  Location:  OR     LASER HOLMIUM LITHOTRIPSY URETER(S), INSERT STENT, COMBINED Left 2018    Procedure: COMBINED CYSTOSCOPY, URETEROSCOPY, LASER HOLMIUM LITHOTRIPSY URETER(S), INSERT STENT;  CYSTOSCOPY, LEFT URETEROSCOPY, LEFT LASER HOLMIUM LITHOTRIPSY URETER(S) REMOVAL BILATERAL STENTS;  Surgeon: Jose Hunter MD;  Location:  OR     ORTHOPEDIC SURGERY      (R) knee scope     ORTHOPEDIC SURGERY      total hip      ORTHOPEDIC SURGERY      elbow surgery       FAMILY HISTORY:  Family History   Problem Relation Age of Onset     Lung Cancer Mother      Heart Failure Father          age 87       SOCIAL HISTORY:  Social History     Socioeconomic History     Marital status:      Spouse name: None     Number of children: 2     Years of  "education: None     Highest education level: None   Occupational History     Occupation: Retired    Social Needs     Financial resource strain: None     Food insecurity:     Worry: None     Inability: None     Transportation needs:     Medical: None     Non-medical: None   Tobacco Use     Smoking status: Never Smoker     Smokeless tobacco: Never Used   Substance and Sexual Activity     Alcohol use: Yes     Comment: quit 10/2015; now abt 1-2 vodka's per night     Drug use: No     Sexual activity: Not Currently     Partners: Female   Lifestyle     Physical activity:     Days per week: None     Minutes per session: None     Stress: None   Relationships     Social connections:     Talks on phone: None     Gets together: None     Attends [employer] service: None     Active member of club or organization: None     Attends meetings of clubs or organizations: None     Relationship status: None     Intimate partner violence:     Fear of current or ex partner: None     Emotionally abused: None     Physically abused: None     Forced sexual activity: None   Other Topics Concern     Parent/sibling w/ CABG, MI or angioplasty before 65F 55M? Not Asked   Social History Narrative    , 2 children, retired . He does not have a living will but desires full code.  (last updated 2/28/2019)        Review of Systems:  Skin:  Negative       Eyes:  Negative      ENT:  Negative      Respiratory:  Negative       Cardiovascular:  Negative      Gastroenterology: Negative      Genitourinary:  not assessed      Musculoskeletal:  Positive for muscular weakness lower extremities  Neurologic:  Negative      Psychiatric:  Positive for sleep disturbances    Heme/Lymph/Imm:  Negative      Endocrine:  Negative        Physical Exam:  Vitals: /71   Pulse 81   Ht 1.88 m (6' 2\")   Wt 111.1 kg (245 lb)   BMI 31.46 kg/m       Constitutional:  cooperative chronically ill      Skin:  warm and dry to the touch      "     Head:  normocephalic        Eyes:  sclera white        Lymph:      ENT:           Neck:           Respiratory:  normal breath sounds, clear to auscultation, normal A-P diameter, normal symmetry, normal respiratory excursion, no use of accessory muscles         Cardiac: normal S1 and S2;no S3 or S4       grade 2;systolic murmur                                                 GI:           Extremities and Muscular Skeletal:            wrapped with vecro compression on right, left BKA.    Neurological:      left BKA amputation    Psych:  Alert and Oriented x 3;affect appropriate, oriented to time, person and place          CC  No referring provider defined for this encounter.                    Thank you for allowing me to participate in the care of your patient.      Sincerely,     Elena Downs MD     [facility]

## 2019-10-07 ENCOUNTER — HOSPITAL ENCOUNTER (OUTPATIENT)
Facility: CLINIC | Age: 74
Discharge: HOME | End: 2019-10-07
Admitting: PHYSICIAN ASSISTANT
Payer: MEDICARE

## 2019-10-07 ENCOUNTER — HOSPITAL ENCOUNTER (OUTPATIENT)
Dept: ULTRASOUND IMAGING | Facility: CLINIC | Age: 74
Discharge: HOME | End: 2019-10-07
Attending: INTERNAL MEDICINE | Admitting: INTERNAL MEDICINE
Payer: MEDICARE

## 2019-10-07 VITALS
DIASTOLIC BLOOD PRESSURE: 78 MMHG | TEMPERATURE: 97.6 F | HEART RATE: 59 BPM | RESPIRATION RATE: 16 BRPM | OXYGEN SATURATION: 100 % | SYSTOLIC BLOOD PRESSURE: 126 MMHG

## 2019-10-07 DIAGNOSIS — K70.31 ALCOHOLIC CIRRHOSIS OF LIVER WITH ASCITES (H): ICD-10-CM

## 2019-10-07 LAB — INR PPP: 3.12 (ref 0.86–1.14)

## 2019-10-07 PROCEDURE — 36415 COLL VENOUS BLD VENIPUNCTURE: CPT

## 2019-10-07 PROCEDURE — 85610 PROTHROMBIN TIME: CPT

## 2019-10-07 PROCEDURE — 40000863 ZZH STATISTIC RADIOLOGY XRAY, US, CT, MAR, NM

## 2019-10-07 PROCEDURE — 25000125 ZZHC RX 250: Performed by: RADIOLOGY

## 2019-10-07 PROCEDURE — 27210190 US PARACENTESIS

## 2019-10-07 RX ORDER — DEXTROSE MONOHYDRATE 25 G/50ML
25-50 INJECTION, SOLUTION INTRAVENOUS
Status: DISCONTINUED | OUTPATIENT
Start: 2019-10-07 | End: 2019-10-07 | Stop reason: HOSPADM

## 2019-10-07 RX ORDER — LIDOCAINE 40 MG/G
CREAM TOPICAL
Status: DISCONTINUED | OUTPATIENT
Start: 2019-10-07 | End: 2019-10-07 | Stop reason: HOSPADM

## 2019-10-07 RX ORDER — NICOTINE POLACRILEX 4 MG
15-30 LOZENGE BUCCAL
Status: DISCONTINUED | OUTPATIENT
Start: 2019-10-07 | End: 2019-10-07 | Stop reason: HOSPADM

## 2019-10-07 RX ORDER — DEXTROSE MONOHYDRATE 25 G/50ML
25-50 INJECTION, SOLUTION INTRAVENOUS
Status: CANCELLED | OUTPATIENT
Start: 2019-10-07

## 2019-10-07 RX ORDER — LIDOCAINE HYDROCHLORIDE 10 MG/ML
10 INJECTION, SOLUTION EPIDURAL; INFILTRATION; INTRACAUDAL; PERINEURAL ONCE
Status: COMPLETED | OUTPATIENT
Start: 2019-10-07 | End: 2019-10-07

## 2019-10-07 RX ORDER — NICOTINE POLACRILEX 4 MG
15-30 LOZENGE BUCCAL
Status: CANCELLED | OUTPATIENT
Start: 2019-10-07

## 2019-10-07 RX ORDER — ALBUMIN (HUMAN) 12.5 G/50ML
12.5 SOLUTION INTRAVENOUS ONCE
Status: CANCELLED | OUTPATIENT
Start: 2019-10-07 | End: 2019-10-07

## 2019-10-07 RX ORDER — ALBUMIN (HUMAN) 12.5 G/50ML
12.5 SOLUTION INTRAVENOUS ONCE
Status: DISCONTINUED | OUTPATIENT
Start: 2019-10-07 | End: 2019-10-07 | Stop reason: HOSPADM

## 2019-10-07 RX ADMIN — LIDOCAINE HYDROCHLORIDE 10 ML: 10 INJECTION, SOLUTION EPIDURAL; INFILTRATION; INTRACAUDAL; PERINEURAL at 14:59

## 2019-10-07 NOTE — DISCHARGE INSTRUCTIONS

## 2019-10-07 NOTE — PROGRESS NOTES
No return call  Patient in the hospital today for Parentheses     CC will follow up in 1-2 business days     [facility]     Xiao Huston  RN Care Coordinator   [facility] / [facility]   Phone: [phone]  Email :  [email or web address]

## 2019-10-07 NOTE — PROGRESS NOTES
Care Suites Admission Nursing Note    Reason for admission: paracentesis  CS arrival time: 1230\   Accompanied by: self  Name/phone of DC : Helena (wife)  Medications held: Xeralto last 10/5   Consent signed: To be obtained  Abnormal assessment/labs: Last INR 3.0, Dr Pittman would like an INR rechecked today.   If abnormal, provider notified: Dr. Pittman paged  Education/questions answered: Discharge instructions reviewed and given.  Plan: paracentesis as scheduled

## 2019-10-07 NOTE — PROGRESS NOTES
Care Suites Post-Procedure Note    Procedure: Paracentesis  CS arrival time: 1539  Accompanied by: RN  Concerns/abnormal assessment after procedure:No  Plan: Continue post procedure plan of care  VS stable, left abdomen site dressing CDI.  Taking po fluids well. Denies any pain or discomfort.  Reinforce discharge instruction.   Wife will  pt.  Dressing changes once dues to oozing per Ran.    Care Suites Discharge Nursing Note    Education/questions answered: yes  Patient DC location: [address]  Accompanied by: family  CS discharge time: 1620

## 2019-10-07 NOTE — PROGRESS NOTES
RADIOLOGY PROCEDURE NOTE  Patient name: Antonio Ramirez  MRN: [id number]  : 1945    Pre-procedure diagnosis: Ascites  Post-procedure diagnosis: Same    Procedure Date/Time: 2019  3:09 PM  Procedure: Paracentesis  Estimated blood loss: None  Specimen(s) collected with description: Yellow fluid  The patient tolerated the procedure well with no immediate complications.  Significant findings:none    See imaging dictation for procedural details.    Provider name: Ran iGraldo PA-C  Assistant(s):None

## 2019-10-07 NOTE — PROGRESS NOTES
1500 Time Out done.    1501 Procedure started     Paracentesis: Pt tolerated well. VSS. 3950cc dark kristen fluid removed from abdomen w/o difficulty. Bandaid applied to left abdomen.  Site - CDI.     1530 Procedure completed    1533 Pt back to Care Suites. Detailed report given to Rekha.

## 2019-10-11 NOTE — PROGRESS NOTES
No return call Disenrollment letter mailed     [facility]     Xiao Huston  RN Care Coordinator   [facility] / [facility]   Phone: [phone]  Email :  [email or web address]

## 2019-10-21 ENCOUNTER — HOSPITAL ENCOUNTER (OUTPATIENT)
Dept: ULTRASOUND IMAGING | Facility: CLINIC | Age: 74
End: 2019-10-21
Attending: INTERNAL MEDICINE | Admitting: RADIOLOGY
Payer: MEDICARE

## 2019-10-21 ENCOUNTER — HOSPITAL ENCOUNTER (OUTPATIENT)
Facility: CLINIC | Age: 74
End: 2019-10-21
Payer: MEDICARE

## 2019-10-21 ENCOUNTER — HOSPITAL ENCOUNTER (OUTPATIENT)
Facility: CLINIC | Age: 74
Discharge: HOME | End: 2019-10-21
Attending: RADIOLOGY | Admitting: RADIOLOGY
Payer: MEDICARE

## 2019-10-21 VITALS
OXYGEN SATURATION: 100 % | SYSTOLIC BLOOD PRESSURE: 127 MMHG | DIASTOLIC BLOOD PRESSURE: 68 MMHG | HEART RATE: 62 BPM | RESPIRATION RATE: 16 BRPM | TEMPERATURE: 97.7 F

## 2019-10-21 DIAGNOSIS — K70.31 ALCOHOLIC CIRRHOSIS OF LIVER WITH ASCITES (H): ICD-10-CM

## 2019-10-21 PROCEDURE — 40000863 ZZH STATISTIC RADIOLOGY XRAY, US, CT, MAR, NM

## 2019-10-21 PROCEDURE — 27210190 US PARACENTESIS

## 2019-10-21 PROCEDURE — 25000125 ZZHC RX 250: Performed by: RADIOLOGY

## 2019-10-21 RX ORDER — LIDOCAINE HYDROCHLORIDE 10 MG/ML
10 INJECTION, SOLUTION EPIDURAL; INFILTRATION; INTRACAUDAL; PERINEURAL ONCE
Status: COMPLETED | OUTPATIENT
Start: 2019-10-21 | End: 2019-10-21

## 2019-10-21 RX ORDER — NICOTINE POLACRILEX 4 MG
15-30 LOZENGE BUCCAL
Status: DISCONTINUED | OUTPATIENT
Start: 2019-10-21 | End: 2019-10-21 | Stop reason: HOSPADM

## 2019-10-21 RX ORDER — DEXTROSE MONOHYDRATE 25 G/50ML
25-50 INJECTION, SOLUTION INTRAVENOUS
Status: DISCONTINUED | OUTPATIENT
Start: 2019-10-21 | End: 2019-10-21 | Stop reason: HOSPADM

## 2019-10-21 RX ADMIN — LIDOCAINE HYDROCHLORIDE 10 ML: 10 INJECTION, SOLUTION EPIDURAL; INFILTRATION; INTRACAUDAL; PERINEURAL at 13:33

## 2019-10-21 NOTE — PROGRESS NOTES
Care Suites Discharge Nursing Note    Education/questions answered: Yes  Patient DC location: Home  Accompanied by: Spouse  CS discharge time: ~1430

## 2019-10-21 NOTE — PROGRESS NOTES
1335Time Out done.    1335 procedure started     Paracentesis: Pt tolerated well. VSS. 3750 cc kristen/cloudy fluid removed from abdomen w/o difficulty. Bandaid applied to Right abdomen. Site- CDI.     1354 Procedure completed      1400 Pt back to Care Suites.

## 2019-10-21 NOTE — DISCHARGE INSTRUCTIONS

## 2019-10-21 NOTE — PROGRESS NOTES
Care Suites Admission Nursing Note    Reason for admission: Paracentesis  CS arrival time: ~1300  Accompanied by: Self   Name/phone of DC : Anali- Helena [phone]  Medications held: Xeralto held since Thursday  Consent signed: Pending   Abnormal assessment/labs: INR 3.0 MD aware and ok to proceed with the procedure   If abnormal, provider notified: Yes  Education/questions answered: Discharge instructions reviewed and questions answered.    Plan: Procedure ~1400

## 2019-10-21 NOTE — PROCEDURES
RADIOLOGY POST PROCEDURE NOTE    Patient name: Antonio Ramirez  MRN: [id number]  : 1945    Pre-procedure diagnosis: ascites  Post-procedure diagnosis: Same    Procedure Date/Time: 2019  1:37 PM  Procedure: paracentesis.   Estimated blood loss: None  Specimen(s) collected with description: ascites.    The patient tolerated the procedure well with no immediate complications.  Significant findings:none    See imaging dictation for procedural details.    Provider name: Mary Pittman DO  Assistant(s):None

## 2019-10-22 ENCOUNTER — HOSPITAL ENCOUNTER (OUTPATIENT)
Facility: CLINIC | Age: 74
End: 2019-10-22
Payer: MEDICARE

## 2019-10-22 ENCOUNTER — HOSPITAL ENCOUNTER (OUTPATIENT)
Age: 74
Discharge: HOME | End: 2019-10-22
Attending: INTERNAL MEDICINE | Admitting: INTERNAL MEDICINE
Payer: MEDICARE

## 2019-10-24 ENCOUNTER — HOSPITAL ENCOUNTER (EMERGENCY)
Facility: CLINIC | Age: 74
Discharge: HOME | End: 2019-10-24
Attending: EMERGENCY MEDICINE | Admitting: EMERGENCY MEDICINE
Payer: MEDICARE

## 2019-10-24 VITALS
OXYGEN SATURATION: 99 % | BODY MASS INDEX: 30.8 KG/M2 | HEIGHT: 74 IN | SYSTOLIC BLOOD PRESSURE: 112 MMHG | TEMPERATURE: 96.7 F | WEIGHT: 240 LBS | DIASTOLIC BLOOD PRESSURE: 50 MMHG | RESPIRATION RATE: 20 BRPM

## 2019-10-24 DIAGNOSIS — T87.9 AMPUTATION STUMP COMPLICATION (H): ICD-10-CM

## 2019-10-24 DIAGNOSIS — S81.802A OPEN WOUND OF LEFT LOWER EXTREMITY, INITIAL ENCOUNTER: ICD-10-CM

## 2019-10-24 PROCEDURE — 99282 EMERGENCY DEPT VISIT SF MDM: CPT

## 2019-10-24 ASSESSMENT — MIFFLIN-ST. JEOR: SCORE: 1888.38

## 2019-10-24 ASSESSMENT — ENCOUNTER SYMPTOMS
FEVER: 0
SHORTNESS OF BREATH: 0
WOUND: 1

## 2019-10-24 NOTE — ED AVS SNAPSHOT
Emergency Department  [address] [place] [address]  Phone:  [phone]  Fax:  [phone]                                    Antonio Ramirez   MRN: [id number]    Department:   Emergency Department   Date of Visit:  10/24/2019           After Visit Summary Signature Page    I have received my discharge instructions, and my questions have been answered. I have discussed any challenges I see with this plan with the nurse or doctor.    ..........................................................................................................................................  Patient/Patient Representative Signature      ..........................................................................................................................................  Patient Representative Print Name and Relationship to Patient    ..................................................               ................................................  Date                                   Time    ..........................................................................................................................................  Reviewed by Signature/Title    ...................................................              ..............................................  Date                                               Time          22EPIC Rev 08/18

## 2019-10-24 NOTE — ED PROVIDER NOTES
History     Chief Complaint:  Wound Check    HPI   Antonio Ramirez is a 74 year old male with a history of chronic kidney disease, chronic congestive heart failure, antiphospholipid syndrome, coronary artery disease, paroxysmal atrial fibrillation, and liver disease with frequent paracentesis on Xarelto and Torsemide status post left leg amputation who presents with wound check. The patient notes that he has chronic lymphedema and a prosthetic on his left lower leg. The patient states that he had gangrene of his lower leg and had an amputation in [place] in 2014. The patient states he is followed for his amputation by [facility] and had his last debridement in 2017. However, the patient states that he is concerned the skin is again infected as the area is 'splitting and red.' He states that he feels his prosthetic is causing irritation behind the knee due to swelling in his upper leg. He admits he is not always compliant with his compression stockings. He denies any streaking rash towards his groin. The wife notes they were just seen at [facility] today and they sent the patient in as his leg is purple and bleeding. The patient denies fever, chest pain, or shortness of breath. Per chart review, the patient has a scheduled echocardiogram tomorrow to further evaluate his increasing lymphedema.      Echocardiogram 9/23/19  Left ventricular systolic function is moderately reduced.  The visual ejection fraction is estimated at 40-45%.  LVEF 41% based on biplane 2D tracing.  There is severe distal anterior, mid-distal septal, and apical wall  hypokinesis.  Severe hypokinesis of the distal inferior wall.  The right ventricle is moderately dilated.  Mildly decreased right ventricular systolic function  The left atrium is severely dilated.  The right atrium is mild to moderately dilated.  There is moderate mitral annular calcification.  There is mild to moderate (1-2+) mitral regurgitation.  There  is mild mitral stenosis.  The mean mitral valve gradient is 4mmHg at heart rate of 69 bpm.  There is mild (1+) tricuspid regurgitation.  Severe (>55mmHg) pulmonary hypertension is present.  Flattened septum is consistent with RV pressure overload.  There is sclerosis, calcification, and restriction of the aortic valve opening  compatible with moderate aortic stenosis.  The calculated aortic valve area is 1.5cm2.  The mean AoV pressure gradient is 18mmHg.  Mild aortic root dilatation (4.0 cm).  The ascending aorta is mildly dilated (4.3 cm).  The rhythm was atrial fibrillation with controlled ventricular rate at rest.     Compared to the study of 6/11/2019, pulmonary pressure is marginally lower  with LVEF slightly higher. Aortic valvular gradients are unchanged. No change  in aortic root or ascending aortic enlargement.    Allergies:  Ciprofloxacin  Hmg-Coa-R inhibitors      Medications:    aspirin 81 MG tablet  gabapentin  600 MG tablet  loperamide 2 MG capsule  multivitamin  Xarelto 15 MG TABS tablet  torsemide 20 MG tablet  vitamin B1 100 MG tablet    Past Medical History:    Alcoholism  Amputated left leg  Anemia  Anticardiolipin antibody syndrome  Antiphospholipid antibody syndrome  Aortic root dilation  Aortic stenosis   Arthritis  Balance problem  Calculi of ureter   Coronary artery disease    Chronic kidney disease, stage 3  Chronic congestive heart failure   Gastro-oesophagea reflux disease  Heart attack  Hiatal hernia  Hypercholesteremia  Hypertension  Ischemic cardiomyopathy  Left foot drop  Liver disease  Low grade B cell lymphoproliferative disorder   Moderate mitral regurgitation  Monoclonal gammopathy  MSSA bacteremia   Neuropathy  Noninfectious ileitis  Nonrhematic tricuspid valve regurgitation  Paroxysmal atrial fibrillation  Pulmonary embolism  Prostate cancer    Pulmonary hypertension  Renal disease   Syncope  Thrombocytopenia  Urethral stricture  Venous thrombosis     Past Surgical History:   "  Ambulate leg below knee x2 (2014 and 2017)   Appendectomy   Apply wound vac  Arthroplasty hip  Arthroplasty knee  CABG  Cholecystectomy  Craniotomy   Endoscopic stripping veins   Eye surgery   Hernia repair umbilical  Incision and drainage lower extremity combined   Irrigation and debridement lower extremity x2  Prostate seen implants   Holmium lithotripsy ureters x2  Elbow surgery   Total hip   right knee scope     Family History:    Mother: lung cancer  Father: heart failure     Social History:  The patient was accompanied to the ED by wife.  Smoking Status: Never Smoker  Smokeless Tobacco: Never Used  Alcohol Use: Yes   1 to 2 vodkas a night   Drug Use: No  PCP: [facility]  Marital Status:       Review of Systems   Constitutional: Negative for fever.   Respiratory: Negative for shortness of breath.    Cardiovascular: Positive for leg swelling. Negative for chest pain.   Skin: Positive for wound.   All other systems reviewed and are negative.    Physical Exam     Patient Vitals for the past 24 hrs:   BP Temp Temp src Heart Rate Resp SpO2 Height Weight   10/24/19 0943 112/50 96.7  F (35.9  C) Oral 72 20 99 % 1.88 m (6' 2\") 108.9 kg (240 lb)       Physical Exam  General: Alert, appears elderly, chronically ill. Cooperative.     In no distress  HEENT:  Head:  Atraumatic  Ears:  External ears are normal  Mouth/Throat:  Oropharynx is without erythema or exudate and mucous membranes are moist.   Eyes:   Conjunctivae normal and EOM are normal. No scleral icterus.  CV:  Normal rate, regular rhythm, normal heart sounds and radial pulses are 2+ and symmetric.  Systolic murmur.  Resp:  Breath sounds are clear bilaterally    Non-labored, no retractions or accessory muscle use  GI:  Abdomen is soft, no distension, no tenderness. No rebound or guarding.    MS:  Normal range of motion. 1+ edema.  Left BKA    Normal strength in all 4 extremities.     Back atraumatic.    No midline cervical, thoracic, or lumbar " tenderness  Skin:  Warm and dry.  Wound to left stump.  Mild skin breakdown and oozing of blood from superficial skin tears.  Mild surrounding redness, although does not appear infected.  No proximal streaking.   Neuro: Alert. Normal strength.  GCS: 15  Psych:  Normal mood and affect.                  Emergency Department Course   Emergency Department Course:  Nursing notes and vitals reviewed.    0955 I performed an exam of the patient as documented above.     1003 I spoke with our care coordinator Aida regarding setting up an appointment with vascular or the wound clinic.     1009 I returned to update and gain further information from the patient.     Findings and plan explained to the Patient. Patient discharged home with instructions regarding supportive care, medications, and reasons to return. The importance of close follow-up was reviewed.     Impression & Plan    Medical Decision Making:  Patient is a 74-year-old male with a complex past medical history who presents with concern for wound to left lower extremity stump.  Patient does have a historical below-knee amputation and wears a prosthetic device the majority of the day.  He does appear to have small skin tears and mild erythema to the majority of his stump distal to the left knee.  Bleeding is well controlled and I do not see sinister signs of cellulitis, necrotizing fasciitis, compartment syndrome, or other concerning soft tissue abnormalities.  I do think patient would benefit from frequent wound cares and potential consultation with Dr. Austin.  I spoke with our  Aida who ultimately obtained a clinic visit to establish with Dr. Austin on Monday.  I do not think antibiotics would be indicated at this time as I have lower concern for cellulitis.  We did discuss trying to allow his lower extremity to be free of the prosthetic and compression covering particularly at nighttime although patient states that if he allows this to happen his  lymphedema worsens to his stump.  I do think he has a challenging case due to the chronic lymphedema and other comorbidities that make wound care is extremely challenging.  He is certainly at risk for developing cellulitis or infection and should continue to monitor and watch for this.  We discussed signs of cellulitis including development of streaking redness, fevers, or development of pain to the left stump.  Return precautions understood and all questions answered for discharge.  Discharged home.    Diagnosis:    ICD-10-CM    1. Open wound of left lower extremity, initial encounter S81.802A    2. Amputation stump complication (H) T87.9        Disposition:   The patient is discharged to home.    Discharge Medications:  No discharge medication.     Scribe Disclosure:  KHOA, Lashell Richards, am serving as a scribe at 9:45 AM on 10/24/2019 to document services personally performed by Coy Coleman MD based on my observations and the provider's statements to me.    EMERGENCY DEPARTMENT       Coy Coleman MD  10/24/19 0471

## 2019-10-24 NOTE — DISCHARGE INSTRUCTIONS
There are no signs of infection today.  I suspect the redness on the stump is likely from chronic irritation.  We discussed that allowing the stump to receive fresh air is important although this can lead to worsening lymphedema of the stump.  At this time no antibiotics are indicated.  Please continue to change her wound dressing every 2 days.    Please go to your appointment on Monday at 8 AM with Dr. Austin - [phone]    Return to the emergency department if you develop fever, spreading redness, pain, or fever.

## 2019-10-24 NOTE — ED NOTES
Bed: ED22  Expected date: 10/24/19  Expected time: 9:40 AM  Means of arrival:   Comments:  Triage

## 2019-10-24 NOTE — PROGRESS NOTES
Patient has amputation below the knee on left leg, wears prosthetics, and now he has some areas of bleeding on stump. Denies any pain, wound care consult would be arranged. Reviewed discharge orders with patient and wife.

## 2019-10-25 ENCOUNTER — HOSPITAL ENCOUNTER (OUTPATIENT)
Dept: CARDIOLOGY | Facility: CLINIC | Age: 74
Discharge: HOME | End: 2019-10-25
Attending: INTERNAL MEDICINE | Admitting: INTERNAL MEDICINE
Payer: MEDICARE

## 2019-10-25 ENCOUNTER — PATIENT OUTREACH (OUTPATIENT)
Dept: FAMILY MEDICINE | Facility: CLINIC | Age: 74
End: 2019-10-25

## 2019-10-25 DIAGNOSIS — I46.9 CARDIAC ARREST (H): ICD-10-CM

## 2019-10-25 DIAGNOSIS — Z89.512 HISTORY OF LEFT BELOW KNEE AMPUTATION (H): Primary | ICD-10-CM

## 2019-10-25 DIAGNOSIS — Z51.89 VISIT FOR WOUND CHECK: ICD-10-CM

## 2019-10-25 PROCEDURE — A9502 TC99M TETROFOSMIN: HCPCS | Performed by: INTERNAL MEDICINE

## 2019-10-25 PROCEDURE — 34300033 ZZH RX 343: Performed by: INTERNAL MEDICINE

## 2019-10-25 RX ADMIN — Medication 3.97 MCI.: at 12:25

## 2019-10-25 NOTE — PROGRESS NOTES
Clinic Care Coordination Contact    Clinic Care Coordination Contact  OUTREACH    Referral Information:  Referral Source: ED Follow-Up    Primary Diagnosis: Other (include Comment box)(Left leg stump skin tear)    Chief Complaint   Patient presents with     Clinic Care Coordination - Post Hospital     Clinic Care Coordination RN        Universal Utilization: ED visit 10/24 --Left leg amputation stump has skin tears   Clinic Utilization  Difficulty keeping appointments:: No  Compliance Concerns: No  No-Show Concerns: No  No PCP office visit in Past Year: No  Utilization    Last refreshed: 10/25/2019  8:04 AM:  Hospital Admissions 3           Last refreshed: 10/25/2019  8:04 AM:  ED Visits 7           Last refreshed: 10/25/2019  8:04 AM:  No Show Count (past year) 3              Current as of: 10/25/2019  8:04 AM              Clinical Concerns:  Current Medical Concerns:  Patient reports he is doing fine and the plan at the ED was to follow up at the wound clinic   Patient has an appointment 10/28/2019 at the wound clinic        Education Provided to patient: Patient will seek medical help if experiencing increased pain-streaking redness or fever Pain  Pain (GOAL):: (n/a)  Health Maintenance Reviewed:    Clinical Pathway: None    Medication Management:  Reviewed  No medication changes      Functional Status:  Dependent ADLs:: Wheelchair-with assist, Transfers, Positioning, Dressing, Bathing, Toileting, Ambulation-walker, Grooming, Incontinence  Dependent IADLs:: Cleaning, Cooking, Laundry, Meal Preparation, Money Management, Medication Management, Shopping, Transportation, Incontinence  Mobility Status: Dependent/Assisted by Another    Living Situation:  Current living arrangement:: I live in a private home with spouse  Type of residence:: Apartment    Diet/Exercise/Sleep:  Food Insecurity: No  Tube Feeding: No  Exercise:: Unable to exercise  Inadequate activity/exercise (GOAL):: No  Significant changes in sleep  pattern (GOAL): No    Transportation:  Transportation concerns (GOAL):: No  Transportation means:: Family     Psychosocial:  Moravian or spiritual beliefs that impact treatment:: Yes  Mental health DX:: Yes  Mental health DX how managed:: None  Mental health management concern (GOAL):: No  Informal Support system:: Children, Spouse     Financial/Insurance:   Financial/Insurance concerns (GOAL):: No  Community Resources: Home Care  Supplies used at home:: None  Equipment Currently Used at Home: walker, rolling, prosthesis    Advance Care Plan/Directive  Advanced Care Plans/Directives on file:: No  Type Advanced Care Plans/Directives: (see today's note)  Advanced Care Plan/Directive Status: Other (comment field)(see today's note)    Referrals Placed: Home Care     Goals:   Goals        General    #1 Functional (pt-stated)     Notes - Note edited  8/19/2019  1:20 PM by Xiao Huston RN    Goal Statement: I will increase my strength by keeping future home physical therapy appointments  Measure of Success: More mobility with daily activities   Supportive Steps to Achieve: I will do home physical therapy exercises as directed   Barriers: Deconditioned   Strengths: Supportive wife   Date to Achieve By: 9/19/2019  Patient expressed understanding of goal: Yes                Patient/Caregiver understanding: Patient has a good understanding of discharge instructions        Future Appointments              Today SCINM1 [facility] CV Nuclear Medicine, CVIMG    In 3 days Matt Chaney MD [facility] Wound Healing [place], [facility]    In 1 week SH BODY RAD; SH BODY RAD; SH IMAGING NURSE; SHUS4 [facility] Ultrasound, [place] [facility]    In 3 weeks SH BODY RAD; SH BODY RAD; SH IMAGING NURSE; SHUS4 [facility] Ultrasound, [facility]    In 1 month Kaycee Beal APRN [facility], [facility] CL[facility]    In 1 month SH BODY RAD; SH BODY RAD; SH  IMAGING NURSE; SHUS4 [place] [facility], [place] [facility]    In 1 month SH BODY RAD; SH BODY RAD; SH IMAGING NURSE; SHUS4 [place] [facility], [place] [facility]    In 2 months SH BODY RAD; SH BODY RAD; SH IMAGING NURSE; SHUS4 [place] [facility], [place] [facility]    In 2 months SH BODY RAD; SH BODY RAD; SH IMAGING NURSE; SHUS4 [place] [facility], [place] [facility]    In 3 months SH BODY RAD; SH BODY RAD; SH IMAGING NURSE; SHUS4 [place] [facility], [place] [facility]    In 3 months SH BODY RAD; SH BODY RAD; SH IMAGING NURSE; SHUS4 [place] [facility], [place] [facility]    In 4 months SH BODY RAD; SH BODY RAD; SH IMAGING NURSE; SHUS4 [place] [facility], [place] [facility]    In 4 months SH BODY RAD; SH BODY RAD; SH IMAGING NURSE; SHUS4 [place] [facility], [place] [facility]    In 5 months SH BODY RAD; SH BODY RAD; SH IMAGING NURSE; SHUS4 [place] [facility], [place] [facility]    In 5 months SH BODY RAD; SH BODY RAD; SH IMAGING NURSE; SHUS4 [place] [facility], [place] [facility]    In 5 months SH BODY RAD; SH BODY RAD; SH IMAGING NURSE; SHUS4 [place] [facility], [place] [facility]    In 6 months SH BODY RAD; SH BODY RAD; SH IMAGING NURSE; SHUS4 [place] [facility], [place] [facility]    In 6 months SH BODY RAD; SH BODY RAD; SH IMAGING NURSE; SHUS4 [place] [facility], [place] [facility]    In 7 months SH BODY RAD; SH BODY RAD; SH IMAGING NURSE; SHUS4 [place] [facility], [place] [facility]    In 7 months SH BODY RAD; SH BODY RAD; SH IMAGING NURSE; SHUS4 [place] [facility], [place] [facility]    In 8 months SH BODY RAD; SH BODY RAD; SH IMAGING NURSE; SHUS4 [place] [facility], [place] [facility]    In 8 months SH BODY RAD; SH BODY RAD; SH IMAGING NURSE; SHUS4 [place] [facility], [place] [facility]    In 9 months SH BODY RAD; SH BODY RAD; SH IMAGING NURSE; SHUS4 [place] [facility], [place] [facility]    In 9 months SH BODY RAD; SH  BODY RAD;  IMAGING NURSE; SH[facility]4 [place] [facility], [facility]    In 10 months  BODY RAD;  BODY RAD;  IMAGING NURSE; US4 [place] [facility], [facility] [facility]    In 10 months  BODY RAD;  BODY RAD;  IMAGING NURSE; SHUS4 [place] [facility], [facility] [facility]          Plan:   No unmet needs, no further care coordination needed at this time   Patient will keep future wound clinic 10/28-[facility] 11/4 and [facility] 11/27  Appointments     [facility]     Xiao Huston  RN Care Coordinator   [facility]   Phone: [phone]  Email :  [email or web address]

## 2019-10-28 ENCOUNTER — TELEPHONE (OUTPATIENT)
Dept: MEDSURG UNIT | Facility: CLINIC | Age: 74
End: 2019-10-28

## 2019-10-28 ENCOUNTER — HOSPITAL ENCOUNTER (OUTPATIENT)
Dept: WOUND CARE | Facility: CLINIC | Age: 74
Discharge: HOME | End: 2019-10-28
Attending: SURGERY | Admitting: SURGERY
Payer: MEDICARE

## 2019-10-28 VITALS
DIASTOLIC BLOOD PRESSURE: 58 MMHG | HEIGHT: 74 IN | RESPIRATION RATE: 16 BRPM | BODY MASS INDEX: 30.8 KG/M2 | SYSTOLIC BLOOD PRESSURE: 118 MMHG | TEMPERATURE: 97 F | WEIGHT: 240 LBS

## 2019-10-28 DIAGNOSIS — L97.921 ULCER OF LEFT LOWER EXTREMITY, LIMITED TO BREAKDOWN OF SKIN (H): ICD-10-CM

## 2019-10-28 DIAGNOSIS — L97.922 ULCER OF LEFT LOWER EXTREMITY WITH FAT LAYER EXPOSED (H): ICD-10-CM

## 2019-10-28 DIAGNOSIS — I73.9 PAD (PERIPHERAL ARTERY DISEASE) (H): ICD-10-CM

## 2019-10-28 DIAGNOSIS — I89.0 LYMPHEDEMA OF BOTH LOWER EXTREMITIES: Primary | ICD-10-CM

## 2019-10-28 PROBLEM — N28.9 RENAL DYSFUNCTION: Status: ACTIVE | Noted: 2017-05-06

## 2019-10-28 PROBLEM — E78.5 HYPERLIPIDEMIA LDL GOAL <70: Status: ACTIVE | Noted: 2019-10-28

## 2019-10-28 PROBLEM — E55.9 VITAMIN D DEFICIENCY: Status: ACTIVE | Noted: 2017-09-12

## 2019-10-28 PROBLEM — Z79.01 LONG TERM (CURRENT) USE OF ANTICOAGULANTS: Status: ACTIVE | Noted: 2019-10-28

## 2019-10-28 PROBLEM — G60.9 PERIPHERAL NEUROPATHY, IDIOPATHIC: Status: ACTIVE | Noted: 2019-10-28

## 2019-10-28 PROBLEM — I26.99 PULMONARY EMBOLISM (H): Status: ACTIVE | Noted: 2019-10-28

## 2019-10-28 PROBLEM — I85.00 ESOPHAGEAL VARICES (H): Status: ACTIVE | Noted: 2019-10-28

## 2019-10-28 PROBLEM — I80.00 PHLEBITIS AND THROMBOPHLEBITIS OF SUPERFICIAL VESSELS OF LOWER EXTREMITIES: Status: ACTIVE | Noted: 2019-10-28

## 2019-10-28 PROBLEM — E78.00 PURE HYPERCHOLESTEROLEMIA: Status: ACTIVE | Noted: 2017-11-08

## 2019-10-28 PROBLEM — R06.02 SOB (SHORTNESS OF BREATH) ON EXERTION: Status: ACTIVE | Noted: 2017-04-12

## 2019-10-28 PROBLEM — Z85.46 PERSONAL HISTORY OF MALIGNANT NEOPLASM OF PROSTATE: Status: ACTIVE | Noted: 2019-10-28

## 2019-10-28 PROBLEM — Z95.1 S/P CABG (CORONARY ARTERY BYPASS GRAFT): Status: ACTIVE | Noted: 2017-04-12

## 2019-10-28 PROBLEM — Z78.9 STATIN INTOLERANCE: Status: ACTIVE | Noted: 2017-09-12

## 2019-10-28 PROCEDURE — G0463 HOSPITAL OUTPT CLINIC VISIT: HCPCS

## 2019-10-28 PROCEDURE — G0463 HOSPITAL OUTPT CLINIC VISIT: HCPCS | Mod: 25

## 2019-10-28 PROCEDURE — 97602 WOUND(S) CARE NON-SELECTIVE: CPT

## 2019-10-28 PROCEDURE — 99203 OFFICE O/P NEW LOW 30 MIN: CPT | Performed by: SURGERY

## 2019-10-28 PROCEDURE — A6212 FOAM DRG <=16 SQ IN W/BORDER: HCPCS

## 2019-10-28 RX ORDER — WARFARIN SODIUM 5 MG/1
5 TABLET ORAL
Status: ON HOLD | COMMUNITY
Start: 2016-10-06 | End: 2019-11-04

## 2019-10-28 RX ORDER — DIOSMIN COMPLEX NO.1 630 MG
1 TABLET ORAL DAILY
Qty: 90 TABLET | Refills: 3 | Status: ON HOLD | OUTPATIENT
Start: 2019-10-28 | End: 2020-02-28

## 2019-10-28 RX ORDER — SPIRONOLACTONE 25 MG/1
TABLET ORAL
Refills: 0 | COMMUNITY
Start: 2019-10-11 | End: 2019-12-16

## 2019-10-28 RX ORDER — EMOLLIENT COMBINATION NO.32
1 EMULSION, EXTENDED RELEASE TOPICAL 2 TIMES DAILY
Qty: 225 G | Refills: 11 | Status: SHIPPED | OUTPATIENT
Start: 2019-10-28 | End: 2020-01-06

## 2019-10-28 ASSESSMENT — MIFFLIN-ST. JEOR: SCORE: 1888.38

## 2019-10-28 NOTE — DISCHARGE INSTRUCTIONS
[facility]  [address] [facility] [address] [address]  Appointment Phone [phone] Nurse Advisors [phone]    Antonio Ramirez      1945    This treatment needs to be done every day to treat and prevent swelling  Apply Lotion to both Legs bilaterally daily or twice daily.  1.Take Vasculera one tablet daily to help decrease inflammation of your chronic venous disease.    2.EpiCeram helps repair and heal the skin barrier through a unique mechanism of action not commonly found in other medications. It contains the skin s natural level of essential lipids: ceramides, cholesterol and free fatty acids, which are reduced in patients with eczema and atopic dermatitis.  EpiCeram is steroid-free, fragrance-free, noncomedogenic,paraben-free, and propylene glycol-free.   Available in a 90g tube and 225g airless pump  Please call [phone] to get this prescription  3. Please add in Vitamin D3 Vitamin 5,000 international units per day.  4.Use Lymphedema Pump daily for one hour each day  5. [facility] [phone] call to make appointment for DOUG  6.Wound Dressing Change:Left Posterior Leg  Cleanse wound with soap and water  Cover wound with mepilex border  Change dressing every other day.    7. Apply Edemawear to right lower leg than apply Velcro wraps on top of edemawear.       Edemawear to be worn at all times.       Apply Edemawear to left thigh than apply  sock -Ok to wear        MJessica Chaney M.D.. October 28, 2019    Call us at [phone] if you have any questions about your wounds, have redness or swelling around your wound, have a fever of 101 or greater or if you have any other problems or concerns. We answer the phone Monday through Friday 8 am to 4 pm, please leave a message as we check the voicemail frequently throughout the day.     Follow up with Provider - 4 weeks

## 2019-10-28 NOTE — PROGRESS NOTES
Patient arrived for wound care visit. Certified Wound Care Nurse time spent evaluating patient record, completed a full evaluation and documented wound(s) & teto-wound skin; provided recommendation based on treatment plan. Applied dressing, reviewed discharge instructions, patient education, and discussed plan of care with appropriate medical team staff members and patient and/or family members.

## 2019-10-28 NOTE — PROGRESS NOTES
[facility] Progress Note    Subject: Antonio Ramirez consultation obtained for ulceration left lower extremity, and history of previous left below-knee amputation apparently 5 years ago.  The patient is a 74-year-old male, alcoholic, ongoing drinking of approximately 2 drinks on a daily basis, does have known cirrhosis, chronic renal insufficiency and history of congestive heart failure.  September 2019 echocardiogram, ejection fraction 41%, severe distal anterior, mid distal septal and apical wall hypokinesis.  Right ventricle is moderately dilated.  Left atrium is severely dilated.  Chronic anemia with hemoglobin of 9-10.  Platelet count 168,000.  GFR 41 with creatinine of 1.6.  Chronic hypoalbuminemia     His wife is present for today's evaluation.  She answers the majority of the questions for him though he states he has no chronic dementia or issues with cognition.  He does have chronic bilateral lower extremity neuropathy.  Chronic anticoagulation with warfarin, history of deep venous thromboses, history of coronary bypass grafting.  Denies history of tobacco use or diabetes. PMH:   Past Medical History:   Diagnosis Date     Alcoholism (H)      Amputated left leg (H)      Anemia      Anticardiolipin antibody syndrome (H)      Antiphospholipid antibody syndrome (H)      Antiplatelet or antithrombotic long-term use      Aortic root dilatation (H)      Aortic stenosis      Arthritis      Balance problem      Coronary artery disease     CABG 2007: SVG to LAD, SVG to diagonal, SVG to OM; cardiac cath 5/17/18: thrombectomy for restenosis of stents-sent for urgent CABG, cath 5/14/2007: GRECIA x2 to LAD, Grecia to diagonal     Gastro-oesophageal reflux disease      Heart attack (H) 2007    apparently arrested, cardiac X5     Hiatal hernia      Hypercholesterolemia      Hypertension      Ischemic cardiomyopathy      Left foot drop      Liver disease     alcoholic liver disease, alcoholic cirrohsosis,  portal hypertension     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)     ?When diagnosed, patient not aware of this     Moderate mitral regurgitation      Monoclonal gammopathy      Neuropathy      Noninfectious ileitis     diverticulitis of colon     Nonrheumatic tricuspid valve regurgitation      Paroxysmal atrial fibrillation (H)      PE (pulmonary embolism) 2006    saddle emboli 2006     Prostate cancer (H) 2000    Treated with radiation (seed implants in 2000) -- no problems since     Pulmonary hypertension (H)      Renal disease     kidney stones     Syncope      Thrombocytopenia (H)      Urethral stricture      Venous thrombosis     IVC filter and lysis procedures 2007     Patient Active Problem List   Diagnosis     Alcohol abuse     Alcoholic cirrhosis of liver (H)     Chronic kidney disease, stage III (moderate) (H)     Physical deconditioning     Prostate cancer -- S/P Radiation Seed implants 2000 (no problems since)     Antiphospholipid antibody syndrome (H)     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 (H)     Chronic congestive heart failure, unspecified congestive heart failure type     Thrombocytopenia -- suspect related to alcohol use     Hyperkalemia     Subdural hematoma (H)     Benign essential hypertension     Malabsorption of iron     Calculi, ureter     Anemia     CAD, S/P CABG in 2007     Aortic stenosis     Nonrheumatic mitral valve regurgitation     Nonrheumatic tricuspid valve regurgitation     Pulmonary hypertension (H)     Paroxysmal atrial fibrillation (H)     Ischemic cardiomyopathy     Aortic root dilatation (H)     Venous thrombosis     S/P total hip arthroplasty     S/P Left BKA 2017     Acute cystitis without hematuria     Traumatic hematoma of buttock     Acute blood loss anemia     CRF (chronic renal failure), stage 3 (moderate) (H)     MSSA bacteremia     H/O fall, recent     Hematoma of left lower extremity, subsequent encounter     Supratherapeutic INR     H/O deep  venous thrombosis     History of pulmonary embolism     Essential hypertension     Chronic systolic congestive heart failure (H)     CKD (chronic kidney disease) stage 3, GFR 30-59 ml/min (H)     Phantom limb pain (H)     Debility     PNA (pneumonia)     Social Hx:   Social History     Socioeconomic History     Marital status:      Spouse name: Not on file     Number of children: 2     Years of education: Not on file     Highest education level: Not on file   Occupational History     Occupation: Retired    Social Needs     Financial resource strain: Not on file     Food insecurity:     Worry: Not on file     Inability: Not on file     Transportation needs:     Medical: Not on file     Non-medical: Not on file   Tobacco Use     Smoking status: Never Smoker     Smokeless tobacco: Never Used   Substance and Sexual Activity     Alcohol use: Yes     Comment: quit 10/2015; now abt 1-2 vodka's per night     Drug use: No     Sexual activity: Not Currently     Partners: Female   Lifestyle     Physical activity:     Days per week: Not on file     Minutes per session: Not on file     Stress: Not on file   Relationships     Social connections:     Talks on phone: Not on file     Gets together: Not on file     Attends Restorationist service: Not on file     Active member of club or organization: Not on file     Attends meetings of clubs or organizations: Not on file     Relationship status: Not on file     Intimate partner violence:     Fear of current or ex partner: Not on file     Emotionally abused: Not on file     Physically abused: Not on file     Forced sexual activity: Not on file   Other Topics Concern     Parent/sibling w/ CABG, MI or angioplasty before 65F 55M? Not Asked   Social History Narrative    , 2 children, retired . He does not have a living will but desires full code.  (last updated 2/28/2019)        Surgical Hx:   Past Surgical History:   Procedure Laterality Date     AMPUTATE  LEG BELOW KNEE Left 04/2014    related to gangrene, started as foot ulcer related to neuropathy     AMPUTATE LEG BELOW KNEE Left 12/4/2017    Procedure: AMPUTATE LEG BELOW KNEE;  Exploration of Left Leg Below Knee Amputation Stump with Ligation of Bleeders.;  Surgeon: Leandro Arreola MD;  Location:  OR     APPENDECTOMY       APPLY WOUND VAC Left 12/6/2017    Procedure: APPLY WOUND VAC;;  Surgeon: Saima Howard MD;  Location:  SD     ARTHROPLASTY HIP Right 11/27/2018    Procedure: RIGHT TOTAL HIP ARTHROPLASTY;  Surgeon: Saima Howard MD;  Location:  OR     ARTHROPLASTY KNEE Right 9/19/2014    Procedure: ARTHROPLASTY KNEE;  Surgeon: Saima Howard MD;  Location:  OR     CARDIAC SURGERY      CABG     CHOLECYSTECTOMY       COLONOSCOPY       COMBINED CYSTOSCOPY, RETROGRADES, EXCHANGE STENT URETER(S) Left 7/24/2018    Procedure: COMBINED CYSTOSCOPY, RETROGRADES, EXCHANGE STENT URETER(S);  CYSTOSCOPY, BILATERAL RETROGRADES, BILATERAL URETERAL STENT EXCHANGE ;  Surgeon: Matt Cervantes MD;  Location:  OR     CRANIOTOMY Right 4/7/2018    Procedure: CRANIOTOMY;  Right Craniotomy For Subdural Hematoma;  Surgeon: Jono Issa MD;  Location:  OR     CYSTOSCOPY, DILATE URETHRA, COMBINED N/A 10/12/2016    Procedure: COMBINED CYSTOSCOPY, DILATE URETHRA;  Surgeon: Dimitry Bello MD;  Location:  OR     CYSTOSCOPY, REMOVE STENT(S), COMBINED Right 7/24/2018    Procedure: COMBINED CYSTOSCOPY, REMOVE STENT(S);;  Surgeon: Jose Hunter MD;  Location:  OR     ENDOSCOPIC STRIPPING VEIN(S)       esophagogastroduodenoscopy       ESOPHAGOSCOPY, GASTROSCOPY, DUODENOSCOPY (EGD), COMBINED N/A 3/11/2019    Procedure: COMBINED ESOPHAGOSCOPY, GASTROSCOPY, DUODENOSCOPY (EGD), BIOPSY SINGLE OR MULTIPLE;  Surgeon: Katherin Byod MD;  Location:  GI     ESOPHAGOSCOPY, GASTROSCOPY, DUODENOSCOPY (EGD), COMBINED N/A 9/27/2019    Procedure: ESOPHAGOGASTRODUODENOSCOPY, WITH  FOREIGN BODY REMOVAL;  Surgeon: Raegan Mckeon MD;  Location:  GI     EYE SURGERY      cataracts     GENITOURINARY SURGERY      Prostate Seen Implants     HERNIA REPAIR      Umbilical     INCISION AND DRAINAGE LOWER EXTREMITY, COMBINED Left 12/1/2017    Procedure: COMBINED INCISION AND DRAINAGE LOWER EXTREMITY;  INCISION AND DRAINAGE OF LEFT BELOW KNEE AMPUTATION ABSCESS, WOUND VAC PLACEMENT;  Surgeon: Saima Howard MD;  Location:  OR     IR IVC FILTER PLACEMENT       IRRIGATION AND DEBRIDEMENT LOWER EXTREMITY, COMBINED Left 12/6/2017    Procedure: COMBINED IRRIGATION AND DEBRIDEMENT LOWER EXTREMITY;  IRRIGATION AND DEBRIDEMENT OF LEFT BELOW KNEE AMPUTATION SITE WITH WOUND VAC REPLACEMENT;  Surgeon: Saima Howard MD;  Location:  SD     IRRIGATION AND DEBRIDEMENT LOWER EXTREMITY, COMBINED Left 12/8/2017    Procedure: COMBINED IRRIGATION AND DEBRIDEMENT LOWER EXTREMITY;  IRRIGATION AND DEBRIDEMENT OF LEFT BELOW THE KNEE AMPUTATION AND SHORTENING OF THE TIBIA WITH WOUND CLOSURE;  Surgeon: Saima Howard MD;  Location:  OR     LASER HOLMIUM LITHOTRIPSY URETER(S), INSERT STENT, COMBINED Bilateral 6/28/2018    Procedure: COMBINED CYSTOSCOPY, URETEROSCOPY, LASER HOLMIUM LITHOTRIPSY URETER(S), INSERT STENT;  CYSTOSCOPY, BILATERAL URETEROSCOPY,  HOLMIUM LASER LITHOTRIPSY, BILATERAL STENT PLACEMENT, STONE BASKETING;  Surgeon: Jose Hunter MD;  Location:  OR     LASER HOLMIUM LITHOTRIPSY URETER(S), INSERT STENT, COMBINED Left 8/14/2018    Procedure: COMBINED CYSTOSCOPY, URETEROSCOPY, LASER HOLMIUM LITHOTRIPSY URETER(S), INSERT STENT;  CYSTOSCOPY, LEFT URETEROSCOPY, LEFT LASER HOLMIUM LITHOTRIPSY URETER(S) REMOVAL BILATERAL STENTS;  Surgeon: Jose Hunter MD;  Location:  OR     ORTHOPEDIC SURGERY      (R) knee scope     ORTHOPEDIC SURGERY      total hip      ORTHOPEDIC SURGERY      elbow surgery       Allergies:    Allergies   Allergen Reactions     Kdc:Minocycline+Brilliant Blue Fcf  "GI Disturbance     11/01/16-PT IS NOT AWARE OF THIS REACTION     Ciprofloxacin Itching     Severe itching \"like ants were crawling\"     Hmg-Coa-R Inhibitors Other (See Comments)     Rhabdomyolysis occurred within a couple days  Rhabdomyolysis       Medications:   Current Outpatient Medications   Medication     aspirin 81 MG EC tablet     Dermatological Products, Misc. (EPICERAM) EMUL     Dietary Management Product (VASCULERA) TABS     gabapentin (NEURONTIN) 600 MG tablet     loperamide (IMODIUM) 2 MG capsule     multivitamin, therapeutic (THERA-VIT) TABS tablet     order for DME     order for DME     order for DME     rivaroxaban ANTICOAGULANT (XARELTO) 15 MG TABS tablet     spironolactone (ALDACTONE) 25 MG tablet     torsemide (DEMADEX) 20 MG tablet     vitamin B1 (THIAMINE) 100 MG tablet     warfarin ANTICOAGULANT (COUMADIN) 5 MG tablet     No current facility-administered medications for this encounter.        Labs:   Recent Labs   Lab Test 10/07/19  1415 09/27/19  1045  04/01/19  0839  05/18/18  1641   ALBUMIN  --  1.9*   < >  --    < >  --    HGB  --  9.7*   < > 8.6*   < > 10.6*   INR 3.12* 3.00*   < >  --    < >  --    WBC  --  4.2   < > 4.1   < > 4.4   A1C  --   --   --   --   --  4.6   CRP  --   --   --  16.7*   < >  --     < > = values in this interval not displayed.     Creatinine   Date Value Ref Range Status   09/27/2019 1.60 (H) 0.66 - 1.25 mg/dL Final     GFR Estimate   Date Value Ref Range Status   09/27/2019 41 (L) >60 mL/min/[1.73_m2] Final     Comment:     Non  GFR Calc  Starting 12/18/2018, serum creatinine based estimated GFR (eGFR) will be   calculated using the Chronic Kidney Disease Epidemiology Collaboration   (CKD-EPI) equation.       GFR Estimate If Black   Date Value Ref Range Status   09/27/2019 48 (L) >60 mL/min/[1.73_m2] Final     Comment:      GFR Calc  Starting 12/18/2018, serum creatinine based estimated GFR (eGFR) will be   calculated using the " "Chronic Kidney Disease Epidemiology Collaboration   (CKD-EPI) equation.       Lab Results   Component Value Date    WBC 4.2 09/27/2019     Lab Results   Component Value Date    RBC 3.43 09/27/2019     Lab Results   Component Value Date    HGB 9.7 09/27/2019     Lab Results   Component Value Date    HCT 30.5 09/27/2019     No components found for: MCT  Lab Results   Component Value Date    MCV 89 09/27/2019     Lab Results   Component Value Date    MCH 28.3 09/27/2019     Lab Results   Component Value Date    MCHC 31.8 09/27/2019     Lab Results   Component Value Date    RDW 16.6 09/27/2019     Lab Results   Component Value Date     09/27/2019          Nutrition requirements were discussed with patient today.  Objective:  /58 (BP Location: Left arm)   Temp 97  F (36.1  C)   Resp 16   Ht 1.88 m (6' 2\")   Wt 108.9 kg (240 lb)   BMI 30.81 kg/m    Wound (used by OP [facility] only) 10/28/19 0906 Left posterior thigh (Active)   Length (cm) 1 10/28/2019  9:00 AM   Width (cm) 3 10/28/2019  9:00 AM   Depth (cm) 0.1 10/28/2019  9:00 AM   Wound (cm^2) 3 cm^2 10/28/2019  9:00 AM   Wound Volume (cm^3) 0.3 cm^3 10/28/2019  9:00 AM   Dressing Appearance moist drainage 10/28/2019  9:00 AM   Drainage Characteristics/Odor serosanguineous 10/28/2019  9:00 AM   Drainage Amount copious 10/28/2019  9:00 AM   Thickness/Stage full thickness 10/28/2019  9:00 AM   Base red/granulating 10/28/2019  9:00 AM   Periwound intact 10/28/2019  9:00 AM   Periwound Temperature warm 10/28/2019  9:00 AM   Periwound Skin Turgor soft 10/28/2019  9:00 AM   Edges open 10/28/2019  9:00 AM   Care, Wound non-select wound debridement performed 10/28/2019  9:00 AM        General:  Patient is alert and orientated, no acute distress.  Nonpalpable right pedal pulse.  Significant edema of right lower extremity and left lower externally.  Ascites present in abdomen, abdomen is benign.  Hyper keratosis of right and left lower extremities, ulceration left " posterior lateral calf junction of below-knee prosthetic to upper thigh, upper thigh has uncontrolled lymphedema.  Xerosis right foot and right calf.  No right lower extremity ulcerations.    Vascular: Nonpalpable pedal pulses.        Impression: Alcoholic with history of congestive heart failure, chronic renal insufficiency, cirrhosis, chronic anemia, chronic anticoagulation, hypoalbuminemia, left lower extremity amputation, primary lymphedema tarda, chronic  Barriers to healing include: lymphedema, PAD, chronic edema and Congestive heart failure, chronic renal insufficiency, cirrhosis, ongoing alcohol utilization, neuropathy    Plan:  We will dress the wounds with Mepilex silicone dressing, control of edema, measurements as noted.  Will require a Flexitouch plus advanced  pump for adequate upper thigh, groin and abdominal receptive decompression, he has been utilizing a simple pump for multiple years without significant impact.  Given the significance of his right and left lower extremity lymphedema tarda, a simple pump trial has not resulted in resolution of symptoms or adequate edema control.  I recommend the Flexitouch plus pump be initially utilized 20 minutes/day then can progress to 60 minutes/day over the course of 2 to 3 months based on overall symptomatology given history of cirrhosis, congestive heart failure chronic renal insufficiency.  Use of the advanced E0 65 2 pump should result in increased capacitance and reservoir.  This will also assist in management of his left lower extremity prostheses and chronic wound given anticipated decompression of the left thigh lymphedema.  He will wear EdemaWear 24 hours/day 7 days/week.  Begin flavonoid, Vasculera, 1 tab daily EpiCeram cream to skin of right foot and right leg and left thigh and left leg at amputation site on a twice daily basis.  He has been reevaluated tomorrow for his left lower extremity prosthetic.  Anticipate it will take 2 to 3 months  to obtain reduction for subsequent ongoing maintenance.  He does have Velcro devices for his right lower extremity and his left thigh.  He is not currently utilizing them consistently.  He was seen by a certified lymphedema therapist within the past 2 months.  He has tried wrapping though could not accomplish successfully, with subsequent transition to Velcro.  Patient will return to the clinic in 2 weeks time.     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]  [] Yes [] No   Chronic Venous Stasis ulcers [I87.2] (non-healing despite 6 months of ongoing treatment)  [] Yes [] No   SYMPTOMS:  PATIENT EXHIBITS THE FOLLOWING SYMPTOMS DESPITE CONSERVATIVE THERAPY (check all that apply):  [x] Hyperkeratosis  [x] Hyperplasia   [x] Hyperpigmentation   [x] Skin breakdown with lymphorrhea (skin weeping)   [] Papillomatosis   [] Recurrent cellulitis   [x] Fibrosis   [] Elephantiasis  [x] Progressive edema  [x] Truncal/abdominal swelling  [] Chest/axillary swelling  [] Genital swelling  [] Unable to control swelling  [x] Impaired ROM   [x] Impaired mobility   [] Pain   CONSERVATIVE TREATMENT:  PATIENT HAS TRIED CONSERVATIVE TREATMENTS (COMPRESSION/EXERCISE/ELEVATION):       [x] Yes [] No     Compression garments: [] <4 weeks   [] 1-5 months   [x] 6-12 months  [] >1 year   Bandaging: [] <4 weeks   [x] 1-5 months   [] 6-12 months  [] >1 year   Elevation:   [] <4 weeks   [] 1-5 months   [] 6-12 months  [x] >1 year   Exercise:     [] <4 weeks   [] 1-5 months   [] 6-12 months  [x] >1 year   TREATMENT PLAN:  Patient to complete the following treatment(s):  See certified lymphedema therapy notes.    MEASUREMENTS:  Lower extremity measurements (measure the largest circumferential point of the listed area):   []  Right Leg  Inseam:_____cm  Measurements  Date: October 28, 2019  []  Left Leg  Inseam:_____cm Measurements  Date: _______   Thigh ___64____cm  Thigh __68_____cm   Knee  _________cm  Knee BKA   Calf __43_____cm  Calf BKA   Ankle ___28____cm  Ankle __BKA   Foot _________cm  Foot _________cm   Hips ___132____cm  Hips _________cm         COMMENTS:  Tactile FlexiTouch plus pump recommended, has failed simple pump utilization over the past 5 years.  The advanced Flexitouch plus pump will result in receptive decongestion, improved control of pelvic and abdomen edema as compared to the simple pump, .

## 2019-10-30 ENCOUNTER — DOCUMENTATION ONLY (OUTPATIENT)
Dept: CARDIOLOGY | Facility: CLINIC | Age: 74
End: 2019-10-30

## 2019-10-30 ENCOUNTER — HOSPITAL ENCOUNTER (OUTPATIENT)
Dept: CARDIOLOGY | Facility: CLINIC | Age: 74
Discharge: HOME | End: 2019-10-30
Attending: INTERNAL MEDICINE | Admitting: INTERNAL MEDICINE
Payer: MEDICARE

## 2019-10-30 VITALS — SYSTOLIC BLOOD PRESSURE: 120 MMHG | DIASTOLIC BLOOD PRESSURE: 60 MMHG

## 2019-10-30 DIAGNOSIS — I27.20 PULMONARY HYPERTENSION (H): Primary | ICD-10-CM

## 2019-10-30 DIAGNOSIS — I46.9 CARDIAC ARREST (H): ICD-10-CM

## 2019-10-30 PROCEDURE — 25000128 H RX IP 250 OP 636: Performed by: INTERNAL MEDICINE

## 2019-10-30 PROCEDURE — A9502 TC99M TETROFOSMIN: HCPCS | Performed by: INTERNAL MEDICINE

## 2019-10-30 PROCEDURE — 93016 CV STRESS TEST SUPVJ ONLY: CPT | Performed by: INTERNAL MEDICINE

## 2019-10-30 PROCEDURE — 78452 HT MUSCLE IMAGE SPECT MULT: CPT

## 2019-10-30 PROCEDURE — 78452 HT MUSCLE IMAGE SPECT MULT: CPT | Mod: 26 | Performed by: INTERNAL MEDICINE

## 2019-10-30 PROCEDURE — 34300033 ZZH RX 343: Performed by: INTERNAL MEDICINE

## 2019-10-30 PROCEDURE — 93018 CV STRESS TEST I&R ONLY: CPT | Performed by: INTERNAL MEDICINE

## 2019-10-30 RX ORDER — ALBUTEROL SULFATE 90 UG/1
2 AEROSOL, METERED RESPIRATORY (INHALATION) EVERY 5 MIN PRN
Status: DISCONTINUED | OUTPATIENT
Start: 2019-10-30 | End: 2019-10-31 | Stop reason: HOSPADM

## 2019-10-30 RX ORDER — ACYCLOVIR 200 MG/1
0-1 CAPSULE ORAL
Status: DISCONTINUED | OUTPATIENT
Start: 2019-10-30 | End: 2019-10-31 | Stop reason: HOSPADM

## 2019-10-30 RX ORDER — REGADENOSON 0.08 MG/ML
0.4 INJECTION, SOLUTION INTRAVENOUS ONCE
Status: COMPLETED | OUTPATIENT
Start: 2019-10-30 | End: 2019-10-30

## 2019-10-30 RX ORDER — AMINOPHYLLINE 25 MG/ML
50-100 INJECTION, SOLUTION INTRAVENOUS
Status: DISCONTINUED | OUTPATIENT
Start: 2019-10-30 | End: 2019-10-31 | Stop reason: HOSPADM

## 2019-10-30 RX ADMIN — Medication 8.55 MCI.: at 09:24

## 2019-10-30 RX ADMIN — REGADENOSON 0.4 MG: 0.08 INJECTION, SOLUTION INTRAVENOUS at 09:19

## 2019-10-30 NOTE — PROGRESS NOTES
Nuclear study 10/30/19 noted. lexiscan study was ordered to follow June admission with 2 code events. Patient to see NANCY Roselyn Beal on 11/27/19.    Lexiscan:   1.  Myocardial perfusion imaging using single isotope technique demonstrated 2 defects:   A. A moderate sized transmural infarction of the distal anteroseptal wall and apex without residual ischemia.   B. A small to moderate sized defect consisting of nontransmural infarction at the base of the inferior wall with a small area of residual ischemia involving the mid and distal inferior wall.   2. Gated images demonstrated flattening of the interventricular septum, suggestive of right ventricular volume or pressure overload. The distal anteroseptal and apical walls are akinetic or possibly dyskinetic. The basal inferior wall is hypokinetic with remainder of the inferior wall appears to contract well. Mild left ventricular chamber enlargement is present.  The left ventricular systolic  function is mildly depressed, with an ejection fraction of 46%.  3. No previous study available for comparison    Patient has history of CAD/stents/CABG x3 2007, atrial fibrillation, aortic stenosis, aortic root dilatation, 3+ MR, 3+ TR, pulmonary hypertension, ischemic cardiomyopathy  He is scheduled for routine abdominal paracentesis on 11/4/19 and 11/18/19.  ER visit 10/24/19 for wound check of left stump. No cellulitis.  Wound clinic seen 10/28/19: advixed  pump for both legs to reduce lymphedema    Will message Dr. Downs to review nuclear study for any recommendations prior to OV 11/27/19

## 2019-11-01 NOTE — TELEPHONE ENCOUNTER
"1. Compared to his nuclear stress test done in 2017 in [place], it appears that he has had a very small heart attack affecting the bottom wall of his heart (inferior base).  2. The heart attack was so small that his overall heart function remains unchanged and only mildly decreased.  It is impossible to tell when it occurred.  As long as he has no heart symptoms, would not do anything different at this time.  3. Recommend a referral to pulmonary hypertension clinic - his \"lung pressures\" remain high after his pulmonary embolism.    4. If he has sleep apnea diagnosed and it were to be treated, his pulmonary pressures could improve. Further down the line, those higher pressures can lead to shortness of breath and fatigue.  Again, recommend a sleep study (could be home study done thru Sleep Clinic).    Thanks.  CD  "

## 2019-11-01 NOTE — PROGRESS NOTES
"Elena Downs MD  [facility] Team 2 55 minutes ago (12:07 PM)         1. Compared to his nuclear stress test done in 2017 in [place], it appears that he has had a very small heart attack affecting the bottom wall of his heart (inferior base).  2. The heart attack was so small that his overall heart function remains unchanged and only mildly decreased.  It is impossible to tell when it occurred.  As long as he has no heart symptoms, would not do anything different at this time.  3. Recommend a referral to pulmonary hypertension clinic - his \"lung pressures\" remain high after his pulmonary embolism.    4. If he has sleep apnea diagnosed and it were to be treated, his pulmonary pressures could improve. Further down the line, those higher pressures can lead to shortness of breath and fatigue.  Again, recommend a sleep study (could be home study done thru Sleep Clinic).     Thanks.  CD        Called patient and read Dr Downs's message as above. Pt states that a some point in the past a cardiologist told him he had old damage to his heart. He is wondering if the damage noted on this study is in the same location as where he had a heart attack back in '05 or '07. Message to Dr Downs. Confirmed f/up w/ Roselyn on 11/27/19.     Pt is agreeable to seeing Dr Ovalle and having sleep study. Referrals entered. Pt states his wife will call back to arrange the f/up w/ Dr Ovalle, declines to be transferred to scheduling at this time.   "

## 2019-11-02 NOTE — TELEPHONE ENCOUNTER
The prior injury is still present (anterior).  The new injury is the very small one.  His overall heart function is unchanged.  He should not feel differently.  Thanks. CD

## 2019-11-04 ENCOUNTER — HOSPITAL ENCOUNTER (OUTPATIENT)
Facility: CLINIC | Age: 74
Discharge: HOME | End: 2019-11-04
Admitting: INTERNAL MEDICINE
Payer: MEDICARE

## 2019-11-04 ENCOUNTER — HOSPITAL ENCOUNTER (OUTPATIENT)
Dept: ULTRASOUND IMAGING | Facility: CLINIC | Age: 74
End: 2019-11-04
Attending: INTERNAL MEDICINE
Payer: MEDICARE

## 2019-11-04 VITALS
OXYGEN SATURATION: 96 % | RESPIRATION RATE: 16 BRPM | SYSTOLIC BLOOD PRESSURE: 124 MMHG | TEMPERATURE: 96 F | DIASTOLIC BLOOD PRESSURE: 44 MMHG

## 2019-11-04 DIAGNOSIS — K70.31 ALCOHOLIC CIRRHOSIS OF LIVER WITH ASCITES (H): ICD-10-CM

## 2019-11-04 LAB
INR PPP: 3.04 (ref 0.86–1.14)
PLATELET # BLD AUTO: 141 10E9/L (ref 150–450)

## 2019-11-04 PROCEDURE — 85049 AUTOMATED PLATELET COUNT: CPT

## 2019-11-04 PROCEDURE — 40000863 ZZH STATISTIC RADIOLOGY XRAY, US, CT, MAR, NM

## 2019-11-04 PROCEDURE — 27210190 US PARACENTESIS

## 2019-11-04 PROCEDURE — 25000125 ZZHC RX 250: Performed by: RADIOLOGY

## 2019-11-04 PROCEDURE — 36415 COLL VENOUS BLD VENIPUNCTURE: CPT

## 2019-11-04 PROCEDURE — 85610 PROTHROMBIN TIME: CPT

## 2019-11-04 RX ORDER — LIDOCAINE 40 MG/G
CREAM TOPICAL
Status: DISCONTINUED | OUTPATIENT
Start: 2019-11-04 | End: 2019-11-04 | Stop reason: HOSPADM

## 2019-11-04 RX ORDER — NICOTINE POLACRILEX 4 MG
15-30 LOZENGE BUCCAL
Status: DISCONTINUED | OUTPATIENT
Start: 2019-11-04 | End: 2019-11-04 | Stop reason: HOSPADM

## 2019-11-04 RX ORDER — DEXTROSE MONOHYDRATE 25 G/50ML
25-50 INJECTION, SOLUTION INTRAVENOUS
Status: DISCONTINUED | OUTPATIENT
Start: 2019-11-04 | End: 2019-11-04 | Stop reason: HOSPADM

## 2019-11-04 RX ORDER — LIDOCAINE HYDROCHLORIDE 10 MG/ML
10 INJECTION, SOLUTION EPIDURAL; INFILTRATION; INTRACAUDAL; PERINEURAL ONCE
Status: COMPLETED | OUTPATIENT
Start: 2019-11-04 | End: 2019-11-04

## 2019-11-04 RX ADMIN — LIDOCAINE HYDROCHLORIDE 10 ML: 10 INJECTION, SOLUTION EPIDURAL; INFILTRATION; INTRACAUDAL; PERINEURAL at 14:13

## 2019-11-04 NOTE — PROGRESS NOTES
[facility] Discharge Nursing Note    Education/questions answered: Discharge instructions reviewed. Dressing remains dry. Discharged per wheelchair in medically stable condition.  Patient DC location: Home  Accompanied by: Wife  CS discharge time: 0592

## 2019-11-04 NOTE — PROGRESS NOTES
[facility] Procedure Nursing Note    Procedure: Paracentesis  Procedure started time: 1405  Procedure completed time: 1425  Concerns/abnormal assessment: 3800 ml of bloody, milky looking fluid removed from peritoneal cavity via right sided puncture site. Insertion site dressing dry.  Plan: Returned to [facility] for stabilization and discharge.

## 2019-11-04 NOTE — PROGRESS NOTES
[facility] Post-Procedure Note    Procedure: Paracentesis  CS arrival time: 1430  Accompanied by: SHIMON Torres  Concerns/abnormal assessment after procedure: NA. Discharge instructions reviewed.  Plan: Stabilize and discharge.

## 2019-11-04 NOTE — DISCHARGE INSTRUCTIONS

## 2019-11-04 NOTE — PROGRESS NOTES
Elena Downs MD  [facility] Heart Team 2 2 days ago         The prior injury is still present (anterior).  The new injury is the very small one.  His overall heart function is unchanged.  He should not feel differently.  Thanks. CD          Called patient to discuss Dr Downs's message as above, LVM to call back.     1054: Patient/pt's wife returned call. Reviewed Dr Downs's message as above with patient. No further questions. Discussed with patient's wife need for f/up in pulmonary HTN clinic and to have sleep study. Helena verbalized understanding. Transferred to scheduling to arrange pulmonary HTN appointment. Helena will call back if they do not hear from sleep clinic.

## 2019-11-04 NOTE — PROGRESS NOTES
Care Suites Admission Nursing Note    Reason for admission: paracentesis  CS arrival time: 1252  Accompanied by: self  Name/phone of : wife will return  Medications held: Xarelto  Consent signed: await radiologist  Abnormal assessment/labs: INR 3.04  If abnormal, provider notified: Benita in Ultrasound, will notify Dr Cardona.  Education/questions answered: Procedure explained. Patient voices understanding. Discharge instructions reviewed.  Plan: Proceed per Dr Cardona.

## 2019-11-11 ENCOUNTER — HOSPITAL ENCOUNTER (OUTPATIENT)
Dept: WOUND CARE | Facility: CLINIC | Age: 74
Discharge: HOME | End: 2019-11-11
Attending: SURGERY | Admitting: SURGERY
Payer: MEDICARE

## 2019-11-11 VITALS
HEART RATE: 78 BPM | TEMPERATURE: 97.9 F | RESPIRATION RATE: 18 BRPM | SYSTOLIC BLOOD PRESSURE: 121 MMHG | DIASTOLIC BLOOD PRESSURE: 55 MMHG

## 2019-11-11 DIAGNOSIS — L97.921 ULCER OF LEFT LOWER EXTREMITY, LIMITED TO BREAKDOWN OF SKIN (H): ICD-10-CM

## 2019-11-11 DIAGNOSIS — I89.0 LYMPHEDEMA OF BOTH LOWER EXTREMITIES: ICD-10-CM

## 2019-11-11 PROCEDURE — G0463 HOSPITAL OUTPT CLINIC VISIT: HCPCS

## 2019-11-11 PROCEDURE — 99213 OFFICE O/P EST LOW 20 MIN: CPT | Performed by: SURGERY

## 2019-11-11 NOTE — DISCHARGE INSTRUCTIONS
[facility]  [address] [facility] [address] [facility] [address]  Appointment Phone [phone] Nurse Advisors [phone]    Antonio Ramirez      1945    This treatment needs to be done every day to treat and prevent swelling  Apply Lotion to both Legs bilaterally daily or twice daily.  1.Take Vasculera one tablet daily to help decrease inflammation of your chronic venous disease.  2.Apply EpiCeram helps repair and heal the skin barrier through a unique mechanism of  action not commonly found in other medications. It contains the skin s natural level of  essential lipids: ceramides, cholesterol and free fatty acids, which are reduced in  patients with eczema and atopic dermatitis.  3. Please add in Vitamin D3 Vitamin 5,000 international units per day.  4.Use Lymphedema Pump daily for one hour morning and night each day on both legs  5. [facility] [phone] call to make appointment for DOUG  6. Apply Edemawear to right lower leg than apply Velcro wraps on top of edemawear.  Edemawear to be worn at all times. Apply Edemawear to left thigh and velcro wraps on top of edemawear.       YONI Chaney M.D.. November 11, 2019    Call us at [phone] if you have any questions about your wounds, have redness or swelling around your wound, have a fever of 101 or greater or if you have any other problems or concerns. We answer the phone Monday through Friday 8 am to 4 pm, please leave a message as we check the voicemail frequently throughout the day.     Follow up with Provider - 4 weeks  Your physician has referred you to [facility]. To make your first appointment call the scheduling line at [phone]. They are located in many different offices through out the metro area.

## 2019-11-12 ENCOUNTER — TELEPHONE (OUTPATIENT)
Dept: MEDSURG UNIT | Facility: CLINIC | Age: 74
End: 2019-11-12

## 2019-11-12 NOTE — ADDENDUM NOTE
Encounter addended by: Mckenzie Linares RN on: 11/12/2019 7:36 AM   Actions taken: LDA properties accepted

## 2019-11-18 ENCOUNTER — HOSPITAL ENCOUNTER (OUTPATIENT)
Facility: CLINIC | Age: 74
Discharge: HOME | End: 2019-11-18
Admitting: INTERNAL MEDICINE
Payer: MEDICARE

## 2019-11-18 ENCOUNTER — HOSPITAL ENCOUNTER (OUTPATIENT)
Dept: ULTRASOUND IMAGING | Facility: CLINIC | Age: 74
End: 2019-11-18
Attending: INTERNAL MEDICINE
Payer: MEDICARE

## 2019-11-18 VITALS
OXYGEN SATURATION: 96 % | RESPIRATION RATE: 16 BRPM | TEMPERATURE: 96.9 F | HEART RATE: 57 BPM | DIASTOLIC BLOOD PRESSURE: 45 MMHG | SYSTOLIC BLOOD PRESSURE: 114 MMHG

## 2019-11-18 DIAGNOSIS — K70.31 ALCOHOLIC CIRRHOSIS OF LIVER WITH ASCITES (H): ICD-10-CM

## 2019-11-18 LAB — INR PPP: 2.01 (ref 0.86–1.14)

## 2019-11-18 PROCEDURE — 40000863 ZZH STATISTIC RADIOLOGY XRAY, US, CT, MAR, NM

## 2019-11-18 PROCEDURE — 25000125 ZZHC RX 250: Performed by: PHYSICIAN ASSISTANT

## 2019-11-18 PROCEDURE — 85610 PROTHROMBIN TIME: CPT

## 2019-11-18 PROCEDURE — 27210190 US PARACENTESIS

## 2019-11-18 PROCEDURE — 36415 COLL VENOUS BLD VENIPUNCTURE: CPT

## 2019-11-18 RX ORDER — LIDOCAINE HYDROCHLORIDE 10 MG/ML
10 INJECTION, SOLUTION EPIDURAL; INFILTRATION; INTRACAUDAL; PERINEURAL ONCE
Status: COMPLETED | OUTPATIENT
Start: 2019-11-18 | End: 2019-11-18

## 2019-11-18 RX ADMIN — LIDOCAINE HYDROCHLORIDE 10 ML: 10 INJECTION, SOLUTION EPIDURAL; INFILTRATION; INTRACAUDAL; PERINEURAL at 14:22

## 2019-11-18 NOTE — PROGRESS NOTES
1323 inr drawn. Reviewed avs discharge instructions with pt and copy given.  1335 inr 2.01, plt 141 11-4-19. Held xarelto since 11-16.

## 2019-11-18 NOTE — DISCHARGE INSTRUCTIONS

## 2019-11-18 NOTE — PROGRESS NOTES
Care Suites Procedure Nursing Note    Procedure: Paracentesis  Procedure started time: 1426  Procedure completed time: 1451  Concerns/abnormal assessment: None.    Paracentesis: Pt tolerated well. VSS. 4800 cc milky/watermelon colored fluid removed from abdomen w/o difficulty.  Dermabond glue and bandaid applied to site - CDI.     1505 Pt back to Care Suites. Detailed report given to Yoly DAMON RN.

## 2019-11-18 NOTE — PROGRESS NOTES
Care Suites Post-Procedure Note    Procedure: paracentesis  CS arrival time: 1505  Accompanied by: Rad tech  Concerns/abnormal assessment after procedure: none  Plan: pt eating and drinking, to be discharged at 1535

## 2019-11-18 NOTE — PROGRESS NOTES
RADIOLOGY PROCEDURE NOTE  Patient name: Antonio Ramirez  MRN: [id number]  : 1945    Pre-procedure diagnosis: Ascites  Post-procedure diagnosis: Same    Procedure Date/Time: 2019  2:35 PM  Procedure: Paracentesis  Estimated blood loss: None  Specimen(s) collected with description: yellow fluid  The patient tolerated the procedure well with no immediate complications.  Significant findings:none    See imaging dictation for procedural details.    Provider name: Ran Giraldo PA-C  Assistant(s):None

## 2019-11-18 NOTE — PROGRESS NOTES
Care Suites Admission Nursing Note    Reason for admission: paracentesis  CS arrival time: 1230  Accompanied by: self  Name/phone of DC : wife Helena [phone]  Medications held: denis  Consent signed: pending MD  Abnormal assessment/labs: INR over 3, 11/4/19, lab ordered stat  If abnormal, provider notified: na  Education/questions answered: yes  Plan: Para scheduled for 1400

## 2019-11-18 NOTE — PROGRESS NOTES
Care Suites Discharge Nursing Note    Education/questions answered: yes  Patient DC location: door #1  Accompanied by: RN  CS discharge time: 4741

## 2019-11-19 ENCOUNTER — TRANSFERRED RECORDS (OUTPATIENT)
Dept: HEALTH INFORMATION MANAGEMENT | Facility: CLINIC | Age: 74
End: 2019-11-19

## 2019-11-20 ENCOUNTER — HOSPITAL ENCOUNTER (OUTPATIENT)
Dept: ULTRASOUND IMAGING | Facility: CLINIC | Age: 74
Discharge: HOME | End: 2019-11-20
Attending: SURGERY | Admitting: SURGERY
Payer: MEDICARE

## 2019-11-20 DIAGNOSIS — I73.9 PAD (PERIPHERAL ARTERY DISEASE) (H): ICD-10-CM

## 2019-11-20 PROCEDURE — 93922 UPR/L XTREMITY ART 2 LEVELS: CPT

## 2019-11-25 ENCOUNTER — TELEPHONE (OUTPATIENT)
Dept: MEDSURG UNIT | Facility: CLINIC | Age: 74
End: 2019-11-25

## 2019-11-25 RX ORDER — NICOTINE POLACRILEX 4 MG
15-30 LOZENGE BUCCAL
Status: CANCELLED | OUTPATIENT
Start: 2019-11-25

## 2019-11-25 RX ORDER — DEXTROSE MONOHYDRATE 25 G/50ML
25-50 INJECTION, SOLUTION INTRAVENOUS
Status: CANCELLED | OUTPATIENT
Start: 2019-11-25

## 2019-11-25 RX ORDER — LIDOCAINE 40 MG/G
CREAM TOPICAL
Status: CANCELLED | OUTPATIENT
Start: 2019-11-25

## 2019-12-02 ENCOUNTER — HOSPITAL ENCOUNTER (OUTPATIENT)
Dept: ULTRASOUND IMAGING | Facility: CLINIC | Age: 74
End: 2019-12-02
Attending: INTERNAL MEDICINE
Payer: MEDICARE

## 2019-12-02 ENCOUNTER — HOSPITAL ENCOUNTER (OUTPATIENT)
Facility: CLINIC | Age: 74
Discharge: HOME | End: 2019-12-02
Admitting: INTERNAL MEDICINE
Payer: MEDICARE

## 2019-12-02 VITALS
DIASTOLIC BLOOD PRESSURE: 51 MMHG | OXYGEN SATURATION: 96 % | HEART RATE: 70 BPM | TEMPERATURE: 96 F | RESPIRATION RATE: 18 BRPM | SYSTOLIC BLOOD PRESSURE: 130 MMHG

## 2019-12-02 DIAGNOSIS — K70.31 ALCOHOLIC CIRRHOSIS OF LIVER WITH ASCITES (H): ICD-10-CM

## 2019-12-02 DIAGNOSIS — K70.31 ASCITES DUE TO ALCOHOLIC CIRRHOSIS (H): ICD-10-CM

## 2019-12-02 LAB
ALBUMIN FLD-MCNC: 0.9 G/DL
APPEARANCE FLD: NORMAL
COLOR FLD: NORMAL
LYMPHOCYTES NFR FLD MANUAL: 54 %
MONOS+MACROS NFR FLD MANUAL: 24 %
NEUTS BAND NFR FLD MANUAL: 4 %
OTHER CELLS FLD MANUAL: 18 %
PROT FLD-MCNC: 2.5 G/DL
SPECIMEN SOURCE FLD: NORMAL
WBC # FLD AUTO: 456 /UL

## 2019-12-02 PROCEDURE — 25000125 ZZHC RX 250: Performed by: INTERNAL MEDICINE

## 2019-12-02 PROCEDURE — 89051 BODY FLUID CELL COUNT: CPT

## 2019-12-02 PROCEDURE — 27210190 US PARACENTESIS

## 2019-12-02 PROCEDURE — 84157 ASSAY OF PROTEIN OTHER: CPT

## 2019-12-02 PROCEDURE — 76700 US EXAM ABDOM COMPLETE: CPT

## 2019-12-02 PROCEDURE — 40000863 ZZH STATISTIC RADIOLOGY XRAY, US, CT, MAR, NM

## 2019-12-02 PROCEDURE — 87070 CULTURE OTHR SPECIMN AEROBIC: CPT

## 2019-12-02 PROCEDURE — 82042 OTHER SOURCE ALBUMIN QUAN EA: CPT

## 2019-12-02 RX ORDER — ALBUMIN (HUMAN) 12.5 G/50ML
12.5 SOLUTION INTRAVENOUS ONCE
Status: DISCONTINUED | OUTPATIENT
Start: 2019-12-02 | End: 2019-12-02 | Stop reason: HOSPADM

## 2019-12-02 RX ORDER — LIDOCAINE HYDROCHLORIDE 10 MG/ML
10 INJECTION, SOLUTION EPIDURAL; INFILTRATION; INTRACAUDAL; PERINEURAL ONCE
Status: COMPLETED | OUTPATIENT
Start: 2019-12-02 | End: 2019-12-02

## 2019-12-02 RX ORDER — DEXTROSE MONOHYDRATE 25 G/50ML
25-50 INJECTION, SOLUTION INTRAVENOUS
Status: DISCONTINUED | OUTPATIENT
Start: 2019-12-02 | End: 2019-12-02 | Stop reason: HOSPADM

## 2019-12-02 RX ORDER — LIDOCAINE 40 MG/G
CREAM TOPICAL
Status: DISCONTINUED | OUTPATIENT
Start: 2019-12-02 | End: 2019-12-02 | Stop reason: HOSPADM

## 2019-12-02 RX ORDER — NICOTINE POLACRILEX 4 MG
15-30 LOZENGE BUCCAL
Status: DISCONTINUED | OUTPATIENT
Start: 2019-12-02 | End: 2019-12-02 | Stop reason: HOSPADM

## 2019-12-02 RX ADMIN — LIDOCAINE HYDROCHLORIDE 10 ML: 10 INJECTION, SOLUTION EPIDURAL; INFILTRATION; INTRACAUDAL; PERINEURAL at 13:41

## 2019-12-02 NOTE — PROGRESS NOTES
RADIOLOGY PROCEDURE NOTE  Patient name: Antonio Ramirez  MRN: [id number]  : 1945    Pre-procedure diagnosis: Ascites  Post-procedure diagnosis: Same    Procedure Date/Time: 2019  1:52 PM  Procedure: Paracentesis  Estimated blood loss: None  Specimen(s) collected with description: Red tinged fluid  The patient tolerated the procedure well with no immediate complications.  Significant findings:none    See imaging dictation for procedural details.    Provider name: Ran Giraldo PA-C  Assistant(s):None

## 2019-12-02 NOTE — PROGRESS NOTES
Care Suites Discharge Nursing Note    Education/questions answered: Pt previously has received discharge instructions.  Patient DC location:   Accompanied by: Wife will meet him at Discharge door #1  CS discharge time: 6326

## 2019-12-02 NOTE — PROGRESS NOTES
Care Suites Admission Nursing Note    Reason for admission: paracentesis and US of abdomen  CS arrival time: 1250  Accompanied by: self  Name/phone of DC : Helena (spouse) [phone]  Medications held: asa last evening, xeralto last 11/29  Consent signed: to be obtained  Abnormal assessment/labs: last INR ok per Ran  If abnormal, provider notified: verbal  Education/questions answered: reviewed and given discharge instructions  Plan: paracentesis as ordered

## 2019-12-02 NOTE — DISCHARGE INSTRUCTIONS

## 2019-12-02 NOTE — PROGRESS NOTES
Care Suites Procedure Nursing Note    Procedure: paracentesis  Procedure started time: 1343  Procedure completed time: 1402  Concerns/abnormal assessment: 4100 ml red colored opaque colored fluid drained from right side of abdomen. Derma-bond to site. Band-aid to site CDI. Post paracentesis, pt had a complete US and was transported back to care suites room by US staff. Tolerating PO.   Plan: Monitor post procedure.

## 2019-12-04 ENCOUNTER — TRANSFERRED RECORDS (OUTPATIENT)
Dept: HEALTH INFORMATION MANAGEMENT | Facility: CLINIC | Age: 74
End: 2019-12-04

## 2019-12-04 LAB — HEP C HIM: NORMAL

## 2019-12-07 LAB
BACTERIA SPEC CULT: NO GROWTH
SPECIMEN SOURCE: NORMAL

## 2019-12-11 ENCOUNTER — TELEPHONE (OUTPATIENT)
Dept: MEDSURG UNIT | Facility: CLINIC | Age: 74
End: 2019-12-11

## 2019-12-15 ENCOUNTER — HEALTH MAINTENANCE LETTER (OUTPATIENT)
Age: 74
End: 2019-12-15

## 2019-12-16 ENCOUNTER — OFFICE VISIT (OUTPATIENT)
Dept: CARDIOLOGY | Facility: CLINIC | Age: 74
End: 2019-12-16
Attending: INTERNAL MEDICINE
Payer: MEDICARE

## 2019-12-16 ENCOUNTER — CARE COORDINATION (OUTPATIENT)
Dept: CARDIOLOGY | Facility: CLINIC | Age: 74
End: 2019-12-16

## 2019-12-16 ENCOUNTER — HOSPITAL ENCOUNTER (OUTPATIENT)
Facility: CLINIC | Age: 74
Discharge: HOME | End: 2019-12-16
Admitting: INTERNAL MEDICINE
Payer: MEDICARE

## 2019-12-16 ENCOUNTER — TELEPHONE (OUTPATIENT)
Dept: MEDSURG UNIT | Facility: CLINIC | Age: 74
End: 2019-12-16

## 2019-12-16 ENCOUNTER — HOSPITAL ENCOUNTER (OUTPATIENT)
Dept: ULTRASOUND IMAGING | Facility: CLINIC | Age: 74
End: 2019-12-16
Attending: INTERNAL MEDICINE
Payer: MEDICARE

## 2019-12-16 VITALS
BODY MASS INDEX: 29.52 KG/M2 | HEIGHT: 74 IN | WEIGHT: 230 LBS | DIASTOLIC BLOOD PRESSURE: 70 MMHG | SYSTOLIC BLOOD PRESSURE: 122 MMHG | HEART RATE: 75 BPM | OXYGEN SATURATION: 93 %

## 2019-12-16 VITALS
RESPIRATION RATE: 16 BRPM | DIASTOLIC BLOOD PRESSURE: 50 MMHG | HEART RATE: 72 BPM | SYSTOLIC BLOOD PRESSURE: 122 MMHG | OXYGEN SATURATION: 98 % | TEMPERATURE: 96 F

## 2019-12-16 DIAGNOSIS — K70.31 ALCOHOLIC CIRRHOSIS OF LIVER WITH ASCITES (H): ICD-10-CM

## 2019-12-16 DIAGNOSIS — K70.30 ALCOHOLIC CIRRHOSIS, UNSPECIFIED WHETHER ASCITES PRESENT (H): ICD-10-CM

## 2019-12-16 DIAGNOSIS — I50.20 HEART FAILURE WITH REDUCED EJECTION FRACTION, NYHA CLASS III (H): ICD-10-CM

## 2019-12-16 DIAGNOSIS — Z79.01 ON RIVAROXABAN THERAPY: ICD-10-CM

## 2019-12-16 DIAGNOSIS — I34.0 MODERATE MITRAL REGURGITATION: ICD-10-CM

## 2019-12-16 DIAGNOSIS — D63.8 ANEMIA IN OTHER CHRONIC DISEASES CLASSIFIED ELSEWHERE: ICD-10-CM

## 2019-12-16 DIAGNOSIS — I48.21 PERMANENT ATRIAL FIBRILLATION (H): ICD-10-CM

## 2019-12-16 DIAGNOSIS — I25.5 ISCHEMIC CARDIOMYOPATHY: ICD-10-CM

## 2019-12-16 DIAGNOSIS — Z89.512 LEFT BELOW-KNEE AMPUTEE (H): ICD-10-CM

## 2019-12-16 DIAGNOSIS — I27.20 PULMONARY HYPERTENSION (H): Primary | ICD-10-CM

## 2019-12-16 DIAGNOSIS — R53.81 PHYSICAL DECONDITIONING: ICD-10-CM

## 2019-12-16 DIAGNOSIS — Z95.1 S/P CABG (CORONARY ARTERY BYPASS GRAFT): ICD-10-CM

## 2019-12-16 DIAGNOSIS — Z86.711 HISTORY OF PULMONARY EMBOLISM: ICD-10-CM

## 2019-12-16 DIAGNOSIS — I25.10 CORONARY ARTERY DISEASE INVOLVING NATIVE CORONARY ARTERY OF NATIVE HEART WITHOUT ANGINA PECTORIS: ICD-10-CM

## 2019-12-16 DIAGNOSIS — R53.2 COMPLETE IMMOBILITY DUE TO SEVERE PHYSICAL DISABILITY OR FRAILTY (H): ICD-10-CM

## 2019-12-16 DIAGNOSIS — N18.30 CHRONIC KIDNEY DISEASE, STAGE 3 (MODERATE): ICD-10-CM

## 2019-12-16 DIAGNOSIS — F10.10 ALCOHOL ABUSE: ICD-10-CM

## 2019-12-16 PROBLEM — I50.9 CHRONIC CONGESTIVE HEART FAILURE (H): Status: RESOLVED | Noted: 2017-06-30 | Resolved: 2019-12-16

## 2019-12-16 PROBLEM — I85.00 ESOPHAGEAL VARICES (H): Status: RESOLVED | Noted: 2019-10-28 | Resolved: 2019-12-16

## 2019-12-16 PROBLEM — R06.02 SOB (SHORTNESS OF BREATH) ON EXERTION: Status: RESOLVED | Noted: 2017-04-12 | Resolved: 2019-12-16

## 2019-12-16 PROBLEM — E87.5 HYPERKALEMIA: Status: RESOLVED | Noted: 2017-12-17 | Resolved: 2019-12-16

## 2019-12-16 PROBLEM — E78.00 PURE HYPERCHOLESTEROLEMIA: Status: RESOLVED | Noted: 2017-11-08 | Resolved: 2019-12-16

## 2019-12-16 PROBLEM — S06.5XAA SUBDURAL HEMATOMA (H): Status: RESOLVED | Noted: 2018-04-07 | Resolved: 2019-12-16

## 2019-12-16 PROBLEM — E78.5 HYPERLIPIDEMIA LDL GOAL <70: Status: RESOLVED | Noted: 2019-10-28 | Resolved: 2019-12-16

## 2019-12-16 PROCEDURE — 40000863 ZZH STATISTIC RADIOLOGY XRAY, US, CT, MAR, NM

## 2019-12-16 PROCEDURE — 93000 ELECTROCARDIOGRAM COMPLETE: CPT | Performed by: INTERNAL MEDICINE

## 2019-12-16 PROCEDURE — 25000125 ZZHC RX 250: Performed by: INTERNAL MEDICINE

## 2019-12-16 PROCEDURE — 25000128 H RX IP 250 OP 636: Performed by: RADIOLOGY

## 2019-12-16 PROCEDURE — P9047 ALBUMIN (HUMAN), 25%, 50ML: HCPCS | Performed by: RADIOLOGY

## 2019-12-16 PROCEDURE — 99215 OFFICE O/P EST HI 40 MIN: CPT | Performed by: INTERNAL MEDICINE

## 2019-12-16 PROCEDURE — 27210190 US PARACENTESIS

## 2019-12-16 RX ORDER — LIDOCAINE HYDROCHLORIDE 10 MG/ML
10 INJECTION, SOLUTION EPIDURAL; INFILTRATION; INTRACAUDAL; PERINEURAL ONCE
Status: COMPLETED | OUTPATIENT
Start: 2019-12-16 | End: 2019-12-16

## 2019-12-16 RX ORDER — TORSEMIDE 20 MG/1
20 TABLET ORAL DAILY
Qty: 100 TABLET | Refills: 3 | COMMUNITY
Start: 2019-12-16 | End: 2020-01-16 | Stop reason: DRUGHIGH

## 2019-12-16 RX ORDER — ALBUMIN (HUMAN) 12.5 G/50ML
12.5 SOLUTION INTRAVENOUS ONCE
Status: COMPLETED | OUTPATIENT
Start: 2019-12-16 | End: 2019-12-16

## 2019-12-16 RX ORDER — NICOTINE POLACRILEX 4 MG
15-30 LOZENGE BUCCAL
Status: DISCONTINUED | OUTPATIENT
Start: 2019-12-16 | End: 2019-12-16 | Stop reason: HOSPADM

## 2019-12-16 RX ORDER — SPIRONOLACTONE 25 MG/1
25 TABLET ORAL DAILY
Qty: 100 TABLET | Refills: 3 | COMMUNITY
Start: 2019-12-16 | End: 2020-01-28

## 2019-12-16 RX ORDER — DEXTROSE MONOHYDRATE 25 G/50ML
25-50 INJECTION, SOLUTION INTRAVENOUS
Status: DISCONTINUED | OUTPATIENT
Start: 2019-12-16 | End: 2019-12-16 | Stop reason: HOSPADM

## 2019-12-16 RX ADMIN — ALBUMIN HUMAN 12.5 G: 0.25 SOLUTION INTRAVENOUS at 14:38

## 2019-12-16 RX ADMIN — LIDOCAINE HYDROCHLORIDE 10 ML: 10 INJECTION, SOLUTION EPIDURAL; INFILTRATION; INTRACAUDAL; PERINEURAL at 13:30

## 2019-12-16 ASSESSMENT — MIFFLIN-ST. JEOR: SCORE: 1843.02

## 2019-12-16 NOTE — PATIENT INSTRUCTIONS
MEDICATION CHANGES:  1.  Continue torsemide 20 mg daily for now.  2.  Increase spironolactone from 12.5 mg daily to 25 mg daily.    OTHER TESTING AND FOLLOW-UP:  1. Refer [facility] NANCY visit in 2 weeks.  2.  Check basic metabolic panel prior to return visit with NANCY.    3.  Schedule follow-up appointment with Dr. Downs in 6 months.    If you have any questions or concerns, please call my nurse Lauren Canales at [phone].

## 2019-12-16 NOTE — LETTER
12/16/2019    Main Hardy MD  [address]  [facility] [address]    RE: Antonio Ramirez       Dear Colleague,    I had the pleasure of seeing Antonio Ramirez in the [facility].    Clinic visit note dictated. Dictation reference number - [id number]          REVIEW OF SYSTEMS:  A comprehensive 10-point review of systems was completed and the pertinent positives are documented in the history of present illness.    Skin:  Negative     Eyes:  Negative    ENT:  Negative    Respiratory:  Positive for shortness of breath(breathing faster pt thinks due to fluid build up )  Cardiovascular:    Positive for;edema;fatigue;lower extremity symptoms;exercise intolerance  Gastroenterology: Positive for poor appetite(ascites and abdominal bloating)  Genitourinary:  not assessed    Musculoskeletal:  Positive for muscular weakness;joint stiffness  Neurologic:  Negative    Psychiatric:  Positive for sleep disturbances;excessive alcohol consumption  Heme/Lymph/Imm:  Positive for anemia  Endocrine:  Negative      CURRENT MEDICATIONS:  Current Outpatient Medications   Medication Sig Dispense Refill     aspirin 81 MG EC tablet Take 81 mg by mouth daily       Dietary Management Product (VASCULERA) TABS Take 1 tablet by mouth daily 90 tablet 3     gabapentin (NEURONTIN) 600 MG tablet Take 600 mg by mouth 2 times daily       loperamide (IMODIUM) 2 MG capsule Take 4 mg by mouth as needed for diarrhea       rivaroxaban ANTICOAGULANT (XARELTO) 15 MG TABS tablet Take 1 tablet (15 mg) by mouth daily (with dinner)       spironolactone (ALDACTONE) 25 MG tablet Take 1 tablet (25 mg) by mouth daily 100 tablet 3     torsemide (DEMADEX) 20 MG tablet Take 1 tablet (20 mg) by mouth daily 100 tablet 3     Dermatological Products, Misc. (EPICERAM) EMUL Externally apply 1 applicator topically 2 times daily 225 g 11     multivitamin, therapeutic (THERA-VIT) TABS tablet Take 1 tablet by mouth daily (Patient not taking:  "Reported on 12/16/2019)       vitamin B1 (THIAMINE) 100 MG tablet Take 1 tablet (100 mg) by mouth daily (Patient not taking: Reported on 12/16/2019)           ALLERGIES:  Allergies   Allergen Reactions     Kdc:Minocycline+Ferron Blue [facility]cf GI Disturbance     11/01/16-PT IS NOT AWARE OF THIS REACTION     Ciprofloxacin Itching     Severe itching \"like ants were crawling\"     Hmg-Coa-R Inhibitors Other (See Comments)     Rhabdomyolysis occurred within a couple days  Rhabdomyolysis       PAST MEDICAL HISTORY:    Past Medical History:   Diagnosis Date     Alcoholism (H)      Amputated left leg (H)      Anemia      Anticardiolipin antibody syndrome (H)      Antiphospholipid antibody syndrome (H)      Antiplatelet or antithrombotic long-term use      Aortic root dilatation (H)      Aortic stenosis      Arthritis      Balance problem      Coronary artery disease     CABG 2007: SVG to LAD, SVG to diagonal, SVG to OM; cardiac cath 5/17/18: thrombectomy for restenosis of stents-sent for urgent CABG, cath 5/14/2007: GRECIA x2 to LAD, Grecia to diagonal     Gastro-oesophageal reflux disease      Heart attack (H) 2007    apparently arrested, cardiac X5     Hiatal hernia      Hypercholesterolemia      Hypertension      Ischemic cardiomyopathy      Left foot drop      Liver disease     alcoholic liver disease, alcoholic cirrohsosis, portal hypertension     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)     ?When diagnosed, patient not aware of this     Moderate mitral regurgitation      Monoclonal gammopathy      Neuropathy      Noninfectious ileitis     diverticulitis of colon     Nonrheumatic tricuspid valve regurgitation      Paroxysmal atrial fibrillation (H)      PE (pulmonary embolism) 2006    saddle emboli 2006     Prostate cancer (H) 2000    Treated with radiation (seed implants in 2000) -- no problems since     Pulmonary hypertension (H)      Renal disease     kidney stones     Syncope      Thrombocytopenia (H)      Urethral " stricture      Venous thrombosis     IVC filter and lysis procedures 2007       PAST SURGICAL HISTORY:    Past Surgical History:   Procedure Laterality Date     AMPUTATE LEG BELOW KNEE Left 04/2014    related to gangrene, started as foot ulcer related to neuropathy     AMPUTATE LEG BELOW KNEE Left 12/4/2017    Procedure: AMPUTATE LEG BELOW KNEE;  Exploration of Left Leg Below Knee Amputation Stump with Ligation of Bleeders.;  Surgeon: Leandro Arreola MD;  Location:  OR     APPENDECTOMY       APPLY WOUND VAC Left 12/6/2017    Procedure: APPLY WOUND VAC;;  Surgeon: Saima Howard MD;  Location:  SD     ARTHROPLASTY HIP Right 11/27/2018    Procedure: RIGHT TOTAL HIP ARTHROPLASTY;  Surgeon: Saima Howard MD;  Location:  OR     ARTHROPLASTY KNEE Right 9/19/2014    Procedure: ARTHROPLASTY KNEE;  Surgeon: Saima Howard MD;  Location:  OR     CARDIAC SURGERY      CABG     CHOLECYSTECTOMY       COLONOSCOPY       COMBINED CYSTOSCOPY, RETROGRADES, EXCHANGE STENT URETER(S) Left 7/24/2018    Procedure: COMBINED CYSTOSCOPY, RETROGRADES, EXCHANGE STENT URETER(S);  CYSTOSCOPY, BILATERAL RETROGRADES, BILATERAL URETERAL STENT EXCHANGE ;  Surgeon: Matt Cervantes MD;  Location:  OR     CRANIOTOMY Right 4/7/2018    Procedure: CRANIOTOMY;  Right Craniotomy For Subdural Hematoma;  Surgeon: Jono Issa MD;  Location:  OR     CYSTOSCOPY, DILATE URETHRA, COMBINED N/A 10/12/2016    Procedure: COMBINED CYSTOSCOPY, DILATE URETHRA;  Surgeon: Dimitry Bello MD;  Location:  OR     CYSTOSCOPY, REMOVE STENT(S), COMBINED Right 7/24/2018    Procedure: COMBINED CYSTOSCOPY, REMOVE STENT(S);;  Surgeon: Jose Hunter MD;  Location:  OR     ENDOSCOPIC STRIPPING VEIN(S)       esophagogastroduodenoscopy       ESOPHAGOSCOPY, GASTROSCOPY, DUODENOSCOPY (EGD), COMBINED N/A 3/11/2019    Procedure: COMBINED ESOPHAGOSCOPY, GASTROSCOPY, DUODENOSCOPY (EGD), BIOPSY SINGLE OR MULTIPLE;  Surgeon:  Katherin Boyd MD;  Location:  GI     ESOPHAGOSCOPY, GASTROSCOPY, DUODENOSCOPY (EGD), COMBINED N/A 9/27/2019    Procedure: ESOPHAGOGASTRODUODENOSCOPY, WITH FOREIGN BODY REMOVAL;  Surgeon: Raegan Mckeon MD;  Location:  GI     EYE SURGERY      cataracts     GENITOURINARY SURGERY      Prostate Seen Implants     HERNIA REPAIR      Umbilical     INCISION AND DRAINAGE LOWER EXTREMITY, COMBINED Left 12/1/2017    Procedure: COMBINED INCISION AND DRAINAGE LOWER EXTREMITY;  INCISION AND DRAINAGE OF LEFT BELOW KNEE AMPUTATION ABSCESS, WOUND VAC PLACEMENT;  Surgeon: Saima Howard MD;  Location:  OR     IR IVC FILTER PLACEMENT       IRRIGATION AND DEBRIDEMENT LOWER EXTREMITY, COMBINED Left 12/6/2017    Procedure: COMBINED IRRIGATION AND DEBRIDEMENT LOWER EXTREMITY;  IRRIGATION AND DEBRIDEMENT OF LEFT BELOW KNEE AMPUTATION SITE WITH WOUND VAC REPLACEMENT;  Surgeon: Saima Howard MD;  Location:  SD     IRRIGATION AND DEBRIDEMENT LOWER EXTREMITY, COMBINED Left 12/8/2017    Procedure: COMBINED IRRIGATION AND DEBRIDEMENT LOWER EXTREMITY;  IRRIGATION AND DEBRIDEMENT OF LEFT BELOW THE KNEE AMPUTATION AND SHORTENING OF THE TIBIA WITH WOUND CLOSURE;  Surgeon: Saima Howard MD;  Location:  OR     LASER HOLMIUM LITHOTRIPSY URETER(S), INSERT STENT, COMBINED Bilateral 6/28/2018    Procedure: COMBINED CYSTOSCOPY, URETEROSCOPY, LASER HOLMIUM LITHOTRIPSY URETER(S), INSERT STENT;  CYSTOSCOPY, BILATERAL URETEROSCOPY,  HOLMIUM LASER LITHOTRIPSY, BILATERAL STENT PLACEMENT, STONE BASKETING;  Surgeon: Jose Hunter MD;  Location:  OR     LASER HOLMIUM LITHOTRIPSY URETER(S), INSERT STENT, COMBINED Left 8/14/2018    Procedure: COMBINED CYSTOSCOPY, URETEROSCOPY, LASER HOLMIUM LITHOTRIPSY URETER(S), INSERT STENT;  CYSTOSCOPY, LEFT URETEROSCOPY, LEFT LASER HOLMIUM LITHOTRIPSY URETER(S) REMOVAL BILATERAL STENTS;  Surgeon: Jose Hunter MD;  Location:  OR     ORTHOPEDIC SURGERY      (R) knee scope  "    ORTHOPEDIC SURGERY      total hip      ORTHOPEDIC SURGERY      elbow surgery       FAMILY HISTORY:    Family History   Problem Relation Age of Onset     Lung Cancer Mother      Heart Failure Father          age 87       SOCIAL HISTORY:    Social History     Socioeconomic History     Marital status:      Spouse name: None     Number of children: 2     Years of education: None     Highest education level: None   Occupational History     Occupation: Retired    Social Needs     Financial resource strain: None     Food insecurity:     Worry: None     Inability: None     Transportation needs:     Medical: None     Non-medical: None   Tobacco Use     Smoking status: Never Smoker     Smokeless tobacco: Never Used   Substance and Sexual Activity     Alcohol use: Yes     Comment: quit 10/2015; now abt 1-2 vodka's per night     Drug use: No     Sexual activity: Not Currently     Partners: Female   Lifestyle     Physical activity:     Days per week: None     Minutes per session: None     Stress: None   Relationships     Social connections:     Talks on phone: None     Gets together: None     Attends [employer]ism service: None     Active member of club or organization: None     Attends meetings of clubs or organizations: None     Relationship status: None     Intimate partner violence:     Fear of current or ex partner: None     Emotionally abused: None     Physically abused: None     Forced sexual activity: None   Other Topics Concern     Parent/sibling w/ CABG, MI or angioplasty before 65F 55M? Not Asked   Social History Narrative    , 2 children, retired . He does not have a living will but desires full code.  (last updated 2019)        PHYSICAL EXAM:    Vitals: /70   Pulse 75   Ht 1.88 m (6' 2\")   Wt 104.3 kg (230 lb)   SpO2 93%   BMI 29.53 kg/m     Wt Readings from Last 5 Encounters:   19 104.3 kg (230 lb)   10/28/19 108.9 kg (240 lb)   10/24/19 108.9 kg (240 " lb)   10/03/19 111.1 kg (245 lb)   09/11/19 110.7 kg (244 lb)             Encounter Diagnoses   Name Primary?     Pulmonary hypertension (H) Yes     Heart failure with reduced ejection fraction, NYHA class III (H)      Alcoholic cirrhosis, unspecified whether ascites present (H)      Chronic kidney disease, stage 3 (moderate) (H)      Anemia in other chronic diseases classified elsewhere      Alcohol abuse      Moderate mitral regurgitation      Ischemic cardiomyopathy      Permanent atrial fibrillation      History of pulmonary embolism      On rivaroxaban therapy      Left below-knee amputee (H)      Physical deconditioning      Complete immobility due to severe physical disability or frailty (H)      Coronary artery disease involving native coronary artery of native heart without angina pectoris      S/P CABG (coronary artery bypass graft)        Orders Placed This Encounter   Procedures     Basic metabolic panel     Follow-Up with Cardiologist     Follow-Up with [facility] - NANCY visit     EKG 12-lead complete w/read - Clinics (performed today)               Thank you for allowing me to participate in the care of your patient.      Sincerely,     Krystin Ovalle MD     [facility]    cc:   Elena Downs MD  [address]  [place] [address]

## 2019-12-16 NOTE — LETTER
12/16/2019      Main Hardy MD  [address]  [facility] [address]      RE: Antonio Andersonsolange       Dear Colleague,    I had the pleasure of seeing Antonio Ramirez in the [facility].    Service Date: 12/16/2019      LOCATION:  [facility], Pul[facility], [facility].      PRIMARY CARDIOLOGIST AND REFERRAL SOURCE:  Elena Downs MD.      PRIMARY CARE PROVIDER:  Dr. Main Hardy.      REASON FOR VISIT:   1.  Rule out pulmonary hypertension based on recent echocardiogram estimated pressures.      HISTORY OF PRESENT ILLNESS:     Mr. Antonio Ramirez is new to my practice.  He was accompanied by his wife, Mary, today.  His history is extremely complex and detailed below.  He was in a wheelchair today.  He is a 74-year-old gentleman with alcohol abuse, alcoholic cirrhosis, and is a retired .      His complex medical history is significant for:   1.  Alcohol dependency with daily significant alcohol intake (circumspect about it, but about 3-5 vodkas per day).   2.  Alcoholic cirrhosis of the liver with ascites and having high-volume paracentesis every 2 weeks.  No history of varices on recent EGD.   3.  Coronary artery disease, status post previous myocardial infarction and CABG.   4.  Persistent atrial fibrillation -- rate control and rivaroxaban anticoagulation.   5.  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  He had a left lower extremity thrombosis in 2007.   6.  Prior history of large B-cell non-Hodgkin's lymphoma.   7.  Stage 3 chronic kidney disease with a baseline creatinine of 1.6.   8.  Chronic anemia and mild thrombocytopenia of 141 but tolerating anticoagulation.   9.  Over the last few months, the patient has had recurrent hospitalizations for what sounds like respiratory failure.  The details of his so-called, he had 1 several months ago in 06/2019, he presented with sepsis and pneumonia and  "had a PEA and possibly ventricular tachycardia (although this is not well delineated).   10.  History of pulmonary embolus in the context of antiphospholipid antibody syndrome and previous IVC filter placement in 2007.      I am seeing him with a question of pulmonary hypertension.  His recent echocardiogram showed known ischemic cardiomyopathy with an LVEF of 40%-45% and wall motion abnormalities.  Moderately dilated right ventricle with mildly decreased systolic function.  Severe left atrial enlargement.  Moderate functional mitral regurgitation.  Mild tricuspid valve regurgitation and an estimated RVSP of about 70 mmHg with a dilated inferior vena cava.  He also has moderate aortic valve stenosis.  During the study, his atrial fibrillation was rate controlled at 75 BPM.      His past medical history is detailed above.  There is no family history of pulmonary hypertension or lung transplantation.  His diuretic therapy has largely been managed by his gastroenterologist at [facility].  He tells me that recently he was on torsemide 20 mg b.i.d., but his creatinine increased and he was asked to discontinue this.  However, his lower extremity edema started to increase, and he is back on 20 mg daily.  He is also on spironolactone 12.5 mg daily.  He has been getting paracentesis every 2 weeks.  He attributes his leg swelling to \"lymphedema.\"        DIAGNOSTIC DATA:     Reviewed his echocardiogram as above.      Labs -- His most recent labs renal function is from 09/2019 with a sodium of 140, potassium 3.7, BUN 20, creatinine 1.6 with an estimated GFR of 41.  Platelets are 141.  Hemoglobin is 9.7.  His total protein is 6.2 and he is severely hypoalbuminemic at 1.9 with normal liver enzymes.  Estimated GFR is 41.  ECG shows atrial fibrillation is rate controlled at 80 BPM.      His last nuclear stress test done a couple of months ago shows a previous infarct but no significant ischemia.      PHYSICAL " EXAMINATION:  Physical exam is challenging.  He preferred to be examined in the wheelchair.  Difficulties with immobility due to physical deconditioning and prosthetic left leg.  Therefore, hard to comment on JVP, but it did not appear elevated.  Few bilateral rales.  Irregularly irregular heart sounds.  A 3/6 mid-peaking ejection systolic murmur of aortic valve stenosis.  The pulmonic component of the second heart sound is not particularly loud.  No RV heave.  Visible ascites.  Visible 3+ right leg edema.  Left leg prosthesis.  Cannot comment on hepatosplenomegaly as he is seated.  Mentally alert and oriented with good muscle mass.  Mild pallor.      DIAGNOSES, ASSESSMENT, PLAN:   1.  Question of pulmonary hypertension based on echo-estimated pressures.   2.  Ischemic cardiomyopathy with LVEF of 40%-45%.   3.  Alcoholic cirrhosis of the liver with needing required paracentesis with ongoing alcohol use.  Not contemplative of abstinence.   4.  Severe hypoproteinemia.   5.  Other comorbidities listed above.      Mr. Ramirez indeed has very complex comorbidities.  He is clearly an intelligent man but does not seem to have a cohesive understanding of his various comorbidities and how all of these impact each other.  For example, he is convinced that the swelling in his leg is due to lymphedema, and his heart failure and liver disease are separate.  The question of whether he has pulmonary hypertension will require a right heart catheterization.  However, before he has this, his diuretic status has to be optimized, his anticoagulation will have to be temporarily bridged, and he is likely to have mixed pulmonary hypertension (from group 2 or left-sided heart disease, congestive heart failure, liver failure, hypoproteinemia).  Additionally, I am not sure he would be an ideal candidate for pulmonary vasodilators, as they almost certainly will come with a side effect of fluid overload.  It sounds as if we do not have the  "luxury of up-titrating diuretics, as his kidneys do not tolerate it.  He is already on paracentesis.  Also, he candidly states he has no intention of stopping alcohol.      His wife sums it up as, \"He has always had a fatalistic attitude and will continue to do what he wants to do\".  The patient also says, \"For years you doctors have been telling me that I am going to die but I have survived for many years\".      He declines referral to an exercise program, Sleep Clinic or 6-minute walk to rule out desaturation.        1.  After extensive discussion and shared decision-making, patient does not want to pursue evaluation for pulmonary hypertension at this stage.  In fact, it took him a lot of discussion to even convince him to be seen in the [facility] for optimization of his diuretic therapy and avoid heart failure hospitalization.   2.  With the above in mind, I agree with leaving him on 20 mg of torsemide (for sure he might need higher doses and probably brief stints in the Infusion Clinic for IV loop diuretic), increase spironolactone from 12.5 mg to 25 mg daily, closely monitor his renal function and follow up in the [facility].   3.  He and his wife had multiple questions about his other comorbidities, i.e. anemia, extent of liver damage, protein levels, etc.  I deferred these to the appropriate specialties and his primary provider.   4.  Down the road, there certainly may be a role for right heart catheterization once his fluid status is somewhat optimized.  Even if it is not for consideration of pulmonary vasodilators, it will certainly help direct the extent of diuretic therapy.   5.  For now, his care will be via Dr. Downs and the [facility].  If he ever changes his mind about further evaluation of pulmonary hypertension and undergoing other testing, I will be happy to see him again.      Complex patient with multiple comorbidities and a detailed discussion that took 60 minutes.  Greater than " 50% of the time was spent in counseling and coordination of care.      cc:   Elena Downs MD   [facility]   [address], [address]   [place]  [address]      Main Hardy MD   [facility]    [address], [address]   [place]  [address]         [facility] MONSE NATHAN MD             D: 2019   T: 2019   MT: LJ      Name:     GALILEA LUGO   MRN:      [id number]        Account:      [id number]   :      1945           Service Date: 2019      Document: [id number]         Outpatient Encounter Medications as of 2019   Medication Sig Dispense Refill     aspirin 81 MG EC tablet Take 81 mg by mouth daily       Dietary Management Product (VASCULERA) TABS Take 1 tablet by mouth daily 90 tablet 3     gabapentin (NEURONTIN) 600 MG tablet Take 600 mg by mouth 2 times daily       loperamide (IMODIUM) 2 MG capsule Take 4 mg by mouth as needed for diarrhea       rivaroxaban ANTICOAGULANT (XARELTO) 15 MG TABS tablet Take 1 tablet (15 mg) by mouth daily (with dinner)       spironolactone (ALDACTONE) 25 MG tablet Take 1 tablet (25 mg) by mouth daily 100 tablet 3     torsemide (DEMADEX) 20 MG tablet Take 1 tablet (20 mg) by mouth daily 100 tablet 3     Dermatological Products, Misc. (EPICERAM) EMUL Externally apply 1 applicator topically 2 times daily 225 g 11     multivitamin, therapeutic (THERA-VIT) TABS tablet Take 1 tablet by mouth daily (Patient not taking: Reported on 2019)       vitamin B1 (THIAMINE) 100 MG tablet Take 1 tablet (100 mg) by mouth daily (Patient not taking: Reported on 2019)       [DISCONTINUED] spironolactone (ALDACTONE) 25 MG tablet TK 1/2 T PO D  0     [DISCONTINUED] torsemide (DEMADEX) 20 MG tablet Take 20 mg by mouth 2 times daily        No facility-administered encounter medications on file as of 2019.        Again, thank you for allowing me to participate in the care of your patient.       Sincerely,    Krystin Ovalle MD     [facility]

## 2019-12-16 NOTE — PROGRESS NOTES
Care Suites Admission Nursing Note    Reason for admission: Paracentesis.  CS arrival time: 1235  Accompanied by: Self.  Name/phone of DC : Dinsusana-wife [phone]  Medications held: Xarelto.  Last dose was   Saturday 12/14/19  Consent signed: MD/PA to round and obtain.  Abnormal assessment/labs: 11/18/19 INR 2.01.  11/21/19 Plt 143  If abnormal, provider notified: NA  Education/questions answered: Yes.  Pre-procedure POC discussed/reviewed.  Patient has had this procedure in the past.  Verbalizes understanding.  Plan: AVS given and reviewed with verbal understanding received.   Proceed with paracentesis.

## 2019-12-16 NOTE — PROGRESS NOTES
Clinic visit note dictated. Dictation reference number - [id number]          REVIEW OF SYSTEMS:  A comprehensive 10-point review of systems was completed and the pertinent positives are documented in the history of present illness.    Skin:  Negative     Eyes:  Negative    ENT:  Negative    Respiratory:  Positive for shortness of breath(breathing faster pt thinks due to fluid build up )  Cardiovascular:    Positive for;edema;fatigue;lower extremity symptoms;exercise intolerance  Gastroenterology: Positive for poor appetite(ascites and abdominal bloating)  Genitourinary:  not assessed    Musculoskeletal:  Positive for muscular weakness;joint stiffness  Neurologic:  Negative    Psychiatric:  Positive for sleep disturbances;excessive alcohol consumption  Heme/Lymph/Imm:  Positive for anemia  Endocrine:  Negative      CURRENT MEDICATIONS:  Current Outpatient Medications   Medication Sig Dispense Refill     aspirin 81 MG EC tablet Take 81 mg by mouth daily       Dietary Management Product (VASCULERA) TABS Take 1 tablet by mouth daily 90 tablet 3     gabapentin (NEURONTIN) 600 MG tablet Take 600 mg by mouth 2 times daily       loperamide (IMODIUM) 2 MG capsule Take 4 mg by mouth as needed for diarrhea       rivaroxaban ANTICOAGULANT (XARELTO) 15 MG TABS tablet Take 1 tablet (15 mg) by mouth daily (with dinner)       spironolactone (ALDACTONE) 25 MG tablet Take 1 tablet (25 mg) by mouth daily 100 tablet 3     torsemide (DEMADEX) 20 MG tablet Take 1 tablet (20 mg) by mouth daily 100 tablet 3     Dermatological Products, Misc. (EPICERAM) EMUL Externally apply 1 applicator topically 2 times daily 225 g 11     multivitamin, therapeutic (THERA-VIT) TABS tablet Take 1 tablet by mouth daily (Patient not taking: Reported on 12/16/2019)       vitamin B1 (THIAMINE) 100 MG tablet Take 1 tablet (100 mg) by mouth daily (Patient not taking: Reported on 12/16/2019)           ALLERGIES:  Allergies   Allergen Reactions      "Kdc:Minocycline+[place] [facility] GI Disturbance     11/01/16-PT IS NOT AWARE OF THIS REACTION     Ciprofloxacin Itching     Severe itching \"like ants were crawling\"     Hmg-Coa-R Inhibitors Other (See Comments)     Rhabdomyolysis occurred within a couple days  Rhabdomyolysis       PAST MEDICAL HISTORY:    Past Medical History:   Diagnosis Date     Alcoholism (H)      Amputated left leg (H)      Anemia      Anticardiolipin antibody syndrome (H)      Antiphospholipid antibody syndrome (H)      Antiplatelet or antithrombotic long-term use      Aortic root dilatation (H)      Aortic stenosis      Arthritis      Balance problem      Coronary artery disease     CABG 2007: SVG to LAD, SVG to diagonal, SVG to OM; cardiac cath 5/17/18: thrombectomy for restenosis of stents-sent for urgent CABG, cath 5/14/2007: GRECIA x2 to LAD, [place]ia to diagonal     Gastro-oesophageal reflux disease      Heart attack (H) 2007    apparently arrested, cardiac X5     Hiatal hernia      Hypercholesterolemia      Hypertension      Ischemic cardiomyopathy      Left foot drop      Liver disease     alcoholic liver disease, alcoholic cirrohsosis, portal hypertension     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)     ?When diagnosed, patient not aware of this     Moderate mitral regurgitation      Monoclonal gammopathy      Neuropathy      Noninfectious ileitis     diverticulitis of colon     Nonrheumatic tricuspid valve regurgitation      Paroxysmal atrial fibrillation (H)      PE (pulmonary embolism) 2006    saddle emboli 2006     Prostate cancer (H) 2000    Treated with radiation (seed implants in 2000) -- no problems since     Pulmonary hypertension (H)      Renal disease     kidney stones     Syncope      Thrombocytopenia (H)      Urethral stricture      Venous thrombosis     IVC filter and lysis procedures 2007       PAST SURGICAL HISTORY:    Past Surgical History:   Procedure Laterality Date     AMPUTATE LEG BELOW KNEE Left 04/2014    " related to gangrene, started as foot ulcer related to neuropathy     AMPUTATE LEG BELOW KNEE Left 12/4/2017    Procedure: AMPUTATE LEG BELOW KNEE;  Exploration of Left Leg Below Knee Amputation Stump with Ligation of Bleeders.;  Surgeon: Leandro Arreola MD;  Location:  OR     APPENDECTOMY       APPLY WOUND VAC Left 12/6/2017    Procedure: APPLY WOUND VAC;;  Surgeon: Saima Howard MD;  Location:  SD     ARTHROPLASTY HIP Right 11/27/2018    Procedure: RIGHT TOTAL HIP ARTHROPLASTY;  Surgeon: Saima Howard MD;  Location:  OR     ARTHROPLASTY KNEE Right 9/19/2014    Procedure: ARTHROPLASTY KNEE;  Surgeon: Saima Howard MD;  Location:  OR     CARDIAC SURGERY      CABG     CHOLECYSTECTOMY       COLONOSCOPY       COMBINED CYSTOSCOPY, RETROGRADES, EXCHANGE STENT URETER(S) Left 7/24/2018    Procedure: COMBINED CYSTOSCOPY, RETROGRADES, EXCHANGE STENT URETER(S);  CYSTOSCOPY, BILATERAL RETROGRADES, BILATERAL URETERAL STENT EXCHANGE ;  Surgeon: Matt Cervantes MD;  Location:  OR     CRANIOTOMY Right 4/7/2018    Procedure: CRANIOTOMY;  Right Craniotomy For Subdural Hematoma;  Surgeon: Jono Issa MD;  Location:  OR     CYSTOSCOPY, DILATE URETHRA, COMBINED N/A 10/12/2016    Procedure: COMBINED CYSTOSCOPY, DILATE URETHRA;  Surgeon: Dimitry Bello MD;  Location:  OR     CYSTOSCOPY, REMOVE STENT(S), COMBINED Right 7/24/2018    Procedure: COMBINED CYSTOSCOPY, REMOVE STENT(S);;  Surgeon: Jose Hunter MD;  Location:  OR     ENDOSCOPIC STRIPPING VEIN(S)       esophagogastroduodenoscopy       ESOPHAGOSCOPY, GASTROSCOPY, DUODENOSCOPY (EGD), COMBINED N/A 3/11/2019    Procedure: COMBINED ESOPHAGOSCOPY, GASTROSCOPY, DUODENOSCOPY (EGD), BIOPSY SINGLE OR MULTIPLE;  Surgeon: Katherin Boyd MD;  Location:  GI     ESOPHAGOSCOPY, GASTROSCOPY, DUODENOSCOPY (EGD), COMBINED N/A 9/27/2019    Procedure: ESOPHAGOGASTRODUODENOSCOPY, WITH FOREIGN BODY REMOVAL;  Surgeon:  Raegan Mckeon MD;  Location:  GI     EYE SURGERY      cataracts     GENITOURINARY SURGERY      Prostate Seen Implants     HERNIA REPAIR      Umbilical     INCISION AND DRAINAGE LOWER EXTREMITY, COMBINED Left 12/1/2017    Procedure: COMBINED INCISION AND DRAINAGE LOWER EXTREMITY;  INCISION AND DRAINAGE OF LEFT BELOW KNEE AMPUTATION ABSCESS, WOUND VAC PLACEMENT;  Surgeon: Saima Howard MD;  Location:  OR     IR IVC FILTER PLACEMENT       IRRIGATION AND DEBRIDEMENT LOWER EXTREMITY, COMBINED Left 12/6/2017    Procedure: COMBINED IRRIGATION AND DEBRIDEMENT LOWER EXTREMITY;  IRRIGATION AND DEBRIDEMENT OF LEFT BELOW KNEE AMPUTATION SITE WITH WOUND VAC REPLACEMENT;  Surgeon: Saima Howard MD;  Location:  SD     IRRIGATION AND DEBRIDEMENT LOWER EXTREMITY, COMBINED Left 12/8/2017    Procedure: COMBINED IRRIGATION AND DEBRIDEMENT LOWER EXTREMITY;  IRRIGATION AND DEBRIDEMENT OF LEFT BELOW THE KNEE AMPUTATION AND SHORTENING OF THE TIBIA WITH WOUND CLOSURE;  Surgeon: Saima Howard MD;  Location:  OR     LASER HOLMIUM LITHOTRIPSY URETER(S), INSERT STENT, COMBINED Bilateral 6/28/2018    Procedure: COMBINED CYSTOSCOPY, URETEROSCOPY, LASER HOLMIUM LITHOTRIPSY URETER(S), INSERT STENT;  CYSTOSCOPY, BILATERAL URETEROSCOPY,  HOLMIUM LASER LITHOTRIPSY, BILATERAL STENT PLACEMENT, STONE BASKETING;  Surgeon: Jose Hunter MD;  Location:  OR     LASER HOLMIUM LITHOTRIPSY URETER(S), INSERT STENT, COMBINED Left 8/14/2018    Procedure: COMBINED CYSTOSCOPY, URETEROSCOPY, LASER HOLMIUM LITHOTRIPSY URETER(S), INSERT STENT;  CYSTOSCOPY, LEFT URETEROSCOPY, LEFT LASER HOLMIUM LITHOTRIPSY URETER(S) REMOVAL BILATERAL STENTS;  Surgeon: Jose Hunter MD;  Location:  OR     ORTHOPEDIC SURGERY      (R) knee scope     ORTHOPEDIC SURGERY      total hip      ORTHOPEDIC SURGERY      elbow surgery       FAMILY HISTORY:    Family History   Problem Relation Age of Onset     Lung Cancer Mother      Heart Failure Father   "        age 87       SOCIAL HISTORY:    Social History     Socioeconomic History     Marital status:      Spouse name: None     Number of children: 2     Years of education: None     Highest education level: None   Occupational History     Occupation: Retired    Social Needs     Financial resource strain: None     Food insecurity:     Worry: None     Inability: None     Transportation needs:     Medical: None     Non-medical: None   Tobacco Use     Smoking status: Never Smoker     Smokeless tobacco: Never Used   Substance and Sexual Activity     Alcohol use: Yes     Comment: quit 10/2015; now abt 1-2 vodka's per night     Drug use: No     Sexual activity: Not Currently     Partners: Female   Lifestyle     Physical activity:     Days per week: None     Minutes per session: None     Stress: None   Relationships     Social connections:     Talks on phone: None     Gets together: None     Attends Anabaptist service: None     Active member of club or organization: None     Attends meetings of clubs or organizations: None     Relationship status: None     Intimate partner violence:     Fear of current or ex partner: None     Emotionally abused: None     Physically abused: None     Forced sexual activity: None   Other Topics Concern     Parent/sibling w/ CABG, MI or angioplasty before 65F 55M? Not Asked   Social History Narrative    , 2 children, retired . He does not have a living will but desires full code.  (last updated 2019)        PHYSICAL EXAM:    Vitals: /70   Pulse 75   Ht 1.88 m (6' 2\")   Wt 104.3 kg (230 lb)   SpO2 93%   BMI 29.53 kg/m    Wt Readings from Last 5 Encounters:   19 104.3 kg (230 lb)   10/28/19 108.9 kg (240 lb)   10/24/19 108.9 kg (240 lb)   10/03/19 111.1 kg (245 lb)   19 110.7 kg (244 lb)             Encounter Diagnoses   Name Primary?     Pulmonary hypertension (H) Yes     Heart failure with reduced ejection fraction, NYHA " class III (H)      Alcoholic cirrhosis, unspecified whether ascites present (H)      Chronic kidney disease, stage 3 (moderate) (H)      Anemia in other chronic diseases classified elsewhere      Alcohol abuse      Moderate mitral regurgitation      Ischemic cardiomyopathy      Permanent atrial fibrillation      History of pulmonary embolism      On rivaroxaban therapy      Left below-knee amputee (H)      Physical deconditioning      Complete immobility due to severe physical disability or frailty (H)      Coronary artery disease involving native coronary artery of native heart without angina pectoris      S/P CABG (coronary artery bypass graft)        Orders Placed This Encounter   Procedures     Basic metabolic panel     Follow-Up with Cardiologist     Follow-Up with [facility] - NANCY visit     EKG 12-lead complete w/read - Clinics (performed today)

## 2019-12-16 NOTE — PROGRESS NOTES
RADIOLOGY PROCEDURE NOTE  Patient name: Antonio Ramirez  MRN: [id number]  : 1945    Pre-procedure diagnosis: Ascites  Post-procedure diagnosis: Same    Procedure Date/Time: 2019  1:44 PM  Procedure: Paracentesis  Estimated blood loss: None  Specimen(s) collected with description: Ascites.  The patient tolerated the procedure well with no immediate complications.    See imaging dictation for procedural details and findings.    Provider name: Juarez Cardona MD  Assistant(s):None

## 2019-12-16 NOTE — DISCHARGE INSTRUCTIONS

## 2019-12-16 NOTE — PROGRESS NOTES
Reviewed the AVS (After Visit Summary) with patient in detail following their office visit with Dr. Ovalle. The patient and wife were educated on the outlined plan of care including ordered tests, labs, medication changes, and follow up. Patient verbalized understanding of the information and agrees to call with any questions or concerns.   [facility] packet given to patient with information on low sodium diet (less than 2000 mg daily). If you eat less salt, you will retain less fluid. Patient states preference to read at home and declines review today.   Return appointment:  New enrollment in [facility] set up with Juli Tanner. Patient was given instructions on when and how to schedule their next office visit with the clinic.  Future Appointments:  1/6/2020  8:30 AM [facility]   1/6/2020  9:30 AM Sarah, ALEXA Molina [facility]   NELSON Dietz, BSN, RN  RN Care Coordinator  [facility]  [phone]

## 2019-12-16 NOTE — PROGRESS NOTES
"Care Suites Procedure Nursing Note    Paracentesis:  Procedure started time: 1340  Procedure completed time: 1409     Pt tolerated well. VSS. 7,150 cc dark cherry fluid removed from abdomen w/o difficulty. Bandaid applied to site - CDI.     1420 Pt back to Care Suites.     Care Suites Post-Procedure Note    Procedure: Paracentesis.  Accompanied by: Maren GIBSON RN    Tolerated PO.  VSS.  Pain free.       1438  IV started.  Albumin 12.5 g infused per protocol post paracentesis.    Care Suites Discharge Nursing Note    Education/questions answered: Yes.  Patient DC location: Discharged out door 1 via WC.  All belongings sent with patient.  Accompanied by: Arcadio ROBINS.  CS discharge time: 1506    1545  Late entry:  Upon getting into the vehicle the patient was a little \"shaky\" and feeling weak.  Able to stand on his own and sit in the front seat but needed assistance with getting his right leg inside the vehicle.  Patient looked a little pale.  Assessed paracentesis site and site remains CDI, soft and flat.  Patient denies dizziness/lightheadedness.  Denies pain.  Advised Dinha and patient to call Primary MD who ordered his paracentesis or go to the emergency room if weakness or unsteadiness becomes worse, blood pressure drops (she states they have a blood pressure cuff at home and she knows how to use it) develops abdominal pain, fever and or chills.   Patient and wife verbalized understanding.             "

## 2019-12-17 NOTE — PROGRESS NOTES
Service Date: 12/16/2019      LOCATION:  [facility], Pulmonary Hypertension [facility], [facility].      PRIMARY CARDIOLOGIST AND REFERRAL SOURCE:  Elena Downs MD.      PRIMARY CARE PROVIDER:  Dr. Main Hardy.      REASON FOR VISIT:   1.  Rule out pulmonary hypertension based on recent echocardiogram estimated pressures.      HISTORY OF PRESENT ILLNESS:     Mr. Antonio Ramirez is new to my practice.  He was accompanied by his wife, Mary, today.  His history is extremely complex and detailed below.  He was in a wheelchair today.  He is a 74-year-old gentleman with alcohol abuse, alcoholic cirrhosis, and is a retired .      His complex medical history is significant for:   1.  Alcohol dependency with daily significant alcohol intake (circumspect about it, but about 3-5 vodkas per day).   2.  Alcoholic cirrhosis of the liver with ascites and having high-volume paracentesis every 2 weeks.  No history of varices on recent EGD.   3.  Coronary artery disease, status post previous myocardial infarction and CABG.   4.  Persistent atrial fibrillation -- rate control and rivaroxaban anticoagulation.   5.  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  He had a left lower extremity thrombosis in 2007.   6.  Prior history of large B-cell non-Hodgkin's lymphoma.   7.  Stage 3 chronic kidney disease with a baseline creatinine of 1.6.   8.  Chronic anemia and mild thrombocytopenia of 141 but tolerating anticoagulation.   9.  Over the last few months, the patient has had recurrent hospitalizations for what sounds like respiratory failure.  The details of his so-called, he had 1 several months ago in 06/2019, he presented with sepsis and pneumonia and had a PEA and possibly ventricular tachycardia (although this is not well delineated).   10.  History of pulmonary embolus in the context of antiphospholipid antibody syndrome and previous IVC filter placement in 2007.      I  "am seeing him with a question of pulmonary hypertension.  His recent echocardiogram showed known ischemic cardiomyopathy with an LVEF of 40%-45% and wall motion abnormalities.  Moderately dilated right ventricle with mildly decreased systolic function.  Severe left atrial enlargement.  Moderate functional mitral regurgitation.  Mild tricuspid valve regurgitation and an estimated RVSP of about 70 mmHg with a dilated inferior vena cava.  He also has moderate aortic valve stenosis.  During the study, his atrial fibrillation was rate controlled at 75 BPM.      His past medical history is detailed above.  There is no family history of pulmonary hypertension or lung transplantation.  His diuretic therapy has largely been managed by his gastroenterologist at [facility].  He tells me that recently he was on torsemide 20 mg b.i.d., but his creatinine increased and he was asked to discontinue this.  However, his lower extremity edema started to increase, and he is back on 20 mg daily.  He is also on spironolactone 12.5 mg daily.  He has been getting paracentesis every 2 weeks.  He attributes his leg swelling to \"lymphedema.\"        DIAGNOSTIC DATA:     Reviewed his echocardiogram as above.      Labs -- His most recent labs renal function is from 09/2019 with a sodium of 140, potassium 3.7, BUN 20, creatinine 1.6 with an estimated GFR of 41.  Platelets are 141.  Hemoglobin is 9.7.  His total protein is 6.2 and he is severely hypoalbuminemic at 1.9 with normal liver enzymes.  Estimated GFR is 41.  ECG shows atrial fibrillation is rate controlled at 80 BPM.      His last nuclear stress test done a couple of months ago shows a previous infarct but no significant ischemia.      PHYSICAL EXAMINATION:    Is challenging.  He preferred to be examined in the wheelchair.  Difficulties with immobility due to physical deconditioning and prosthetic left leg.  Therefore, hard to comment on JVP, but it did not appear " "elevated.  Few bilateral rales.  Irregularly irregular heart sounds.  A 3/6 mid-peaking ejection systolic murmur of aortic valve stenosis.  The pulmonic component of the second heart sound is not particularly loud.  No RV heave.  Visible ascites.  Visible 3+ right leg edema.  Left leg prosthesis.  Cannot comment on hepatosplenomegaly as he is seated.  Mentally alert and oriented with good muscle mass.  Mild pallor.      DIAGNOSES, ASSESSMENT, PLAN:   1.  Question of pulmonary hypertension based on echo-estimated pressures.   2.  Ischemic cardiomyopathy with LVEF of 40%-45%.   3.  Alcoholic cirrhosis of the liver with needing required paracentesis with ongoing alcohol use.  Not contemplative of abstinence.   4.  Severe hypoproteinemia.   5.  Other comorbidities listed above.      Mr. Ramirez indeed has very complex comorbidities.  He is clearly an intelligent man but does not seem to have a cohesive understanding of his various comorbidities and how all of these impact each other.  For example, he is convinced that the swelling in his leg is due to lymphedema, and his heart failure and liver disease are separate.  The question of whether he has pulmonary hypertension will require a right heart catheterization.  However, before he has this, his diuretic status has to be optimized, his anticoagulation will have to be temporarily bridged, and he is likely to have mixed pulmonary hypertension (from group 2 or left-sided heart disease, congestive heart failure, liver failure, hypoproteinemia).  Additionally, I am not sure he would be an ideal candidate for pulmonary vasodilators, as they almost certainly will come with a side effect of fluid overload.  It sounds as if we do not have the luxury of up-titrating diuretics, as his kidneys do not tolerate it.  He is already on paracentesis.  Also, he candidly states he has no intention of stopping alcohol.      His wife sums it up as, \"He has always had a fatalistic attitude " "and will continue to do what he wants to do\".  The patient also says, \"For years you doctors have been telling me that I am going to die but I have survived for many years\".      He declines referral to an exercise program, Sleep Clinic or 6-minute walk to rule out desaturation.        1.  After extensive discussion and shared decision-making, patient does not want to pursue evaluation for pulmonary hypertension at this stage.  In fact, it took him a lot of discussion to even convince him to be seen in the [facility] for optimization of his diuretic therapy and avoid heart failure hospitalization.   2.  With the above in mind, I agree with leaving him on 20 mg of torsemide (for sure he might need higher doses and probably brief stints in the Infusion Clinic for IV loop diuretic), increase spironolactone from 12.5 mg to 25 mg daily, closely monitor his renal function and follow up in the [facility].   3.  He and his wife had multiple questions about his other comorbidities, i.e. anemia, extent of liver damage, protein levels, etc.  I deferred these to the appropriate specialties and his primary provider.   4.  Down the road, there certainly may be a role for right heart catheterization once his fluid status is somewhat optimized.  Even if it is not for consideration of pulmonary vasodilators, it will certainly help direct the extent of diuretic therapy.   5.  For now, his care will be via Dr. Downs and the [facility].  If he ever changes his mind about further evaluation of pulmonary hypertension and undergoing other testing, I will be happy to see him again.      Complex patient with multiple comorbidities and a detailed discussion that took 60 minutes.  Greater than 50% of the time was spent in counseling and coordination of care.      cc:   Elena Downs MD   [facility]   [address] [place] [address], [address]   [place]  [address]      Main Hardy MD   [facility]  "   [address], [address]   [place]  [address]         [facility] MONSE NATHAN MD             D: 2019   T: 2019   MT: AIDAN      Name:     GALILEA LUGO   MRN:      [id number]        Account:      [id number]   :      1945           Service Date: 2019      Document: [id number]

## 2019-12-26 RX ORDER — NICOTINE POLACRILEX 4 MG
15-30 LOZENGE BUCCAL
Status: CANCELLED | OUTPATIENT
Start: 2019-12-26

## 2019-12-26 RX ORDER — LIDOCAINE 40 MG/G
CREAM TOPICAL
Status: CANCELLED | OUTPATIENT
Start: 2019-12-26

## 2019-12-26 RX ORDER — DEXTROSE MONOHYDRATE 25 G/50ML
25-50 INJECTION, SOLUTION INTRAVENOUS
Status: CANCELLED | OUTPATIENT
Start: 2019-12-26

## 2019-12-27 ENCOUNTER — TELEPHONE (OUTPATIENT)
Dept: MEDSURG UNIT | Facility: CLINIC | Age: 74
End: 2019-12-27

## 2019-12-30 ENCOUNTER — HOSPITAL ENCOUNTER (OUTPATIENT)
Dept: ULTRASOUND IMAGING | Facility: CLINIC | Age: 74
End: 2019-12-30
Attending: INTERNAL MEDICINE
Payer: MEDICARE

## 2019-12-30 ENCOUNTER — HOSPITAL ENCOUNTER (OUTPATIENT)
Facility: CLINIC | Age: 74
Discharge: HOME | End: 2019-12-30
Admitting: PHYSICIAN ASSISTANT
Payer: MEDICARE

## 2019-12-30 VITALS
TEMPERATURE: 97 F | RESPIRATION RATE: 16 BRPM | SYSTOLIC BLOOD PRESSURE: 124 MMHG | OXYGEN SATURATION: 98 % | DIASTOLIC BLOOD PRESSURE: 42 MMHG | HEART RATE: 70 BPM

## 2019-12-30 DIAGNOSIS — K70.31 ALCOHOLIC CIRRHOSIS OF LIVER WITH ASCITES (H): ICD-10-CM

## 2019-12-30 LAB
INR PPP: 2.05 (ref 0.86–1.14)
PLATELET # BLD AUTO: 153 10E9/L (ref 150–450)

## 2019-12-30 PROCEDURE — 36415 COLL VENOUS BLD VENIPUNCTURE: CPT | Performed by: PHYSICIAN ASSISTANT

## 2019-12-30 PROCEDURE — 85610 PROTHROMBIN TIME: CPT | Performed by: PHYSICIAN ASSISTANT

## 2019-12-30 PROCEDURE — 85049 AUTOMATED PLATELET COUNT: CPT | Performed by: PHYSICIAN ASSISTANT

## 2019-12-30 PROCEDURE — 27210190 US PARACENTESIS

## 2019-12-30 PROCEDURE — 25000125 ZZHC RX 250: Performed by: RADIOLOGY

## 2019-12-30 PROCEDURE — P9047 ALBUMIN (HUMAN), 25%, 50ML: HCPCS | Performed by: PHYSICIAN ASSISTANT

## 2019-12-30 PROCEDURE — 40000863 ZZH STATISTIC RADIOLOGY XRAY, US, CT, MAR, NM

## 2019-12-30 PROCEDURE — 25000128 H RX IP 250 OP 636: Performed by: PHYSICIAN ASSISTANT

## 2019-12-30 RX ORDER — LIDOCAINE HYDROCHLORIDE 10 MG/ML
10 INJECTION, SOLUTION EPIDURAL; INFILTRATION; INTRACAUDAL; PERINEURAL ONCE
Status: COMPLETED | OUTPATIENT
Start: 2019-12-30 | End: 2019-12-30

## 2019-12-30 RX ORDER — DEXTROSE MONOHYDRATE 25 G/50ML
25-50 INJECTION, SOLUTION INTRAVENOUS
Status: DISCONTINUED | OUTPATIENT
Start: 2019-12-30 | End: 2019-12-30 | Stop reason: HOSPADM

## 2019-12-30 RX ORDER — LIDOCAINE 40 MG/G
CREAM TOPICAL
Status: DISCONTINUED | OUTPATIENT
Start: 2019-12-30 | End: 2019-12-30 | Stop reason: HOSPADM

## 2019-12-30 RX ORDER — ALBUMIN (HUMAN) 12.5 G/50ML
12.5 SOLUTION INTRAVENOUS ONCE
Status: COMPLETED | OUTPATIENT
Start: 2019-12-30 | End: 2019-12-30

## 2019-12-30 RX ORDER — NICOTINE POLACRILEX 4 MG
15-30 LOZENGE BUCCAL
Status: DISCONTINUED | OUTPATIENT
Start: 2019-12-30 | End: 2019-12-30 | Stop reason: HOSPADM

## 2019-12-30 RX ORDER — ALBUMIN (HUMAN) 12.5 G/50ML
12.5 SOLUTION INTRAVENOUS ONCE
Status: DISCONTINUED | OUTPATIENT
Start: 2019-12-30 | End: 2019-12-30 | Stop reason: HOSPADM

## 2019-12-30 RX ADMIN — ALBUMIN HUMAN 12.5 G: 0.25 SOLUTION INTRAVENOUS at 14:54

## 2019-12-30 RX ADMIN — LIDOCAINE HYDROCHLORIDE 10 ML: 10 INJECTION, SOLUTION EPIDURAL; INFILTRATION; INTRACAUDAL; PERINEURAL at 13:51

## 2019-12-30 NOTE — PROGRESS NOTES
Care Suites Discharge Nursing Note    Albumin 50 ml given  Education/questions answered: Yes  Patient DC location: Home  Accompanied by: Wife  CS discharge time: 15:24

## 2019-12-30 NOTE — PROCEDURES
[facility]    Procedure: Paracentesis  Date/Time: 12/30/2019 2:07 PM  Performed by: Shirley Brody PA-C  Authorized by: Shirley Brody PA-C     UNIVERSAL PROTOCOL   Site Marked: Yes  Prior Images Obtained and Reviewed:  Yes  Required items: Required blood products, implants, devices and special equipment available    Patient identity confirmed:  Verbally with patient  NA - No sedation, light sedation, or local anesthesia  Confirmation Checklist:  Patient's identity using two indicators, relevant allergies, procedure was appropriate and matched the consent or emergent situation and correct equipment/implants were available  Time out: Immediately prior to the procedure a time out was called    Universal Protocol: the Joint Commission Universal Protocol was followed    Preparation: Patient was prepped and draped in usual sterile fashion           ANESTHESIA    Anesthesia: Local infiltration  Local Anesthetic:  Lidocaine 1% without epinephrine      SEDATION    Patient Sedated: No    See dictated procedure note for full details.  PROCEDURE   Patient Tolerance:  Patient tolerated the procedure well with no immediate complications  Describe Procedure: Rust colored fluid removed.   Length of time physician/provider present for 1:1 monitoring during sedation: 0

## 2019-12-30 NOTE — PROGRESS NOTES
[facility]report to Iza ROBINS and albumin started

## 2019-12-30 NOTE — DISCHARGE INSTRUCTIONS

## 2020-01-01 ENCOUNTER — HOSPITAL ENCOUNTER (OUTPATIENT)
Dept: CT IMAGING | Facility: CLINIC | Age: 75
End: 2020-06-29
Attending: INTERNAL MEDICINE
Payer: MEDICARE

## 2020-01-01 ENCOUNTER — VIRTUAL VISIT (OUTPATIENT)
Dept: FAMILY MEDICINE | Facility: CLINIC | Age: 75
End: 2020-01-01
Payer: MEDICARE

## 2020-01-01 ENCOUNTER — TELEPHONE (OUTPATIENT)
Dept: MEDSURG UNIT | Facility: CLINIC | Age: 75
End: 2020-01-01

## 2020-01-01 ENCOUNTER — DOCUMENTATION ONLY (OUTPATIENT)
Dept: LAB | Facility: CLINIC | Age: 75
End: 2020-01-01

## 2020-01-01 ENCOUNTER — APPOINTMENT (OUTPATIENT)
Dept: PHYSICAL THERAPY | Facility: CLINIC | Age: 75
DRG: 299 | End: 2020-01-01
Payer: MEDICARE

## 2020-01-01 ENCOUNTER — OFFICE VISIT (OUTPATIENT)
Dept: FAMILY MEDICINE | Facility: CLINIC | Age: 75
End: 2020-01-01
Payer: MEDICARE

## 2020-01-01 ENCOUNTER — TELEPHONE (OUTPATIENT)
Dept: FAMILY MEDICINE | Facility: CLINIC | Age: 75
End: 2020-01-01

## 2020-01-01 ENCOUNTER — ANESTHESIA EVENT (OUTPATIENT)
Dept: GASTROENTEROLOGY | Facility: CLINIC | Age: 75
DRG: 299 | End: 2020-01-01
Payer: MEDICARE

## 2020-01-01 ENCOUNTER — PATIENT OUTREACH (OUTPATIENT)
Dept: NURSING | Facility: CLINIC | Age: 75
End: 2020-01-01
Payer: MEDICARE

## 2020-01-01 ENCOUNTER — APPOINTMENT (OUTPATIENT)
Dept: GENERAL RADIOLOGY | Facility: CLINIC | Age: 75
DRG: 299 | End: 2020-01-01
Attending: INTERNAL MEDICINE
Payer: MEDICARE

## 2020-01-01 ENCOUNTER — APPOINTMENT (OUTPATIENT)
Dept: ULTRASOUND IMAGING | Facility: CLINIC | Age: 75
DRG: 299 | End: 2020-01-01
Attending: INTERNAL MEDICINE
Payer: MEDICARE

## 2020-01-01 ENCOUNTER — ANESTHESIA (OUTPATIENT)
Dept: GASTROENTEROLOGY | Facility: CLINIC | Age: 75
DRG: 299 | End: 2020-01-01
Payer: MEDICARE

## 2020-01-01 ENCOUNTER — APPOINTMENT (OUTPATIENT)
Dept: OCCUPATIONAL THERAPY | Facility: CLINIC | Age: 75
DRG: 299 | End: 2020-01-01
Payer: MEDICARE

## 2020-01-01 ENCOUNTER — NURSE TRIAGE (OUTPATIENT)
Dept: NURSING | Facility: CLINIC | Age: 75
End: 2020-01-01

## 2020-01-01 ENCOUNTER — APPOINTMENT (OUTPATIENT)
Dept: INTERVENTIONAL RADIOLOGY/VASCULAR | Facility: CLINIC | Age: 75
DRG: 299 | End: 2020-01-01
Attending: INTERNAL MEDICINE
Payer: MEDICARE

## 2020-01-01 ENCOUNTER — PATIENT OUTREACH (OUTPATIENT)
Dept: CARE COORDINATION | Facility: CLINIC | Age: 75
End: 2020-01-01

## 2020-01-01 ENCOUNTER — HOSPITAL ENCOUNTER (OUTPATIENT)
Facility: CLINIC | Age: 75
Discharge: HOME | End: 2020-05-18
Admitting: INTERNAL MEDICINE
Payer: MEDICARE

## 2020-01-01 ENCOUNTER — RESULTS ONLY (OUTPATIENT)
Dept: ENDOSCOPY | Facility: CLINIC | Age: 75
End: 2020-01-01

## 2020-01-01 ENCOUNTER — APPOINTMENT (OUTPATIENT)
Dept: PHYSICAL THERAPY | Facility: CLINIC | Age: 75
DRG: 299 | End: 2020-01-01
Attending: INTERNAL MEDICINE
Payer: MEDICARE

## 2020-01-01 ENCOUNTER — HOSPITAL ENCOUNTER (EMERGENCY)
Facility: CLINIC | Age: 75
Discharge: HOME | End: 2020-08-11
Attending: EMERGENCY MEDICINE | Admitting: EMERGENCY MEDICINE
Payer: MEDICARE

## 2020-01-01 ENCOUNTER — CARE COORDINATION (OUTPATIENT)
Dept: CARDIOLOGY | Facility: CLINIC | Age: 75
End: 2020-01-01

## 2020-01-01 ENCOUNTER — ANESTHESIA EVENT (OUTPATIENT)
Dept: SURGERY | Facility: CLINIC | Age: 75
DRG: 299 | End: 2020-01-01
Payer: MEDICARE

## 2020-01-01 ENCOUNTER — HOSPITAL ENCOUNTER (OUTPATIENT)
Dept: ULTRASOUND IMAGING | Facility: CLINIC | Age: 75
End: 2020-12-07
Attending: INTERNAL MEDICINE
Payer: MEDICARE

## 2020-01-01 ENCOUNTER — HOSPITAL ENCOUNTER (OUTPATIENT)
Dept: ULTRASOUND IMAGING | Facility: CLINIC | Age: 75
End: 2020-06-15
Attending: INTERNAL MEDICINE | Admitting: INTERNAL MEDICINE
Payer: MEDICARE

## 2020-01-01 ENCOUNTER — ANESTHESIA (OUTPATIENT)
Dept: SURGERY | Facility: CLINIC | Age: 75
DRG: 299 | End: 2020-01-01
Payer: MEDICARE

## 2020-01-01 ENCOUNTER — APPOINTMENT (OUTPATIENT)
Dept: NUCLEAR MEDICINE | Facility: CLINIC | Age: 75
DRG: 299 | End: 2020-01-01
Attending: INTERNAL MEDICINE
Payer: MEDICARE

## 2020-01-01 ENCOUNTER — HOSPITAL ENCOUNTER (OUTPATIENT)
Facility: CLINIC | Age: 75
Discharge: HOME | End: 2020-06-15
Attending: INTERNAL MEDICINE | Admitting: INTERNAL MEDICINE
Payer: MEDICARE

## 2020-01-01 ENCOUNTER — HOSPITAL ENCOUNTER (OUTPATIENT)
Facility: CLINIC | Age: 75
Discharge: HOME | End: 2020-04-27
Attending: INTERNAL MEDICINE | Admitting: INTERNAL MEDICINE
Payer: MEDICARE

## 2020-01-01 ENCOUNTER — APPOINTMENT (OUTPATIENT)
Dept: ULTRASOUND IMAGING | Facility: CLINIC | Age: 75
End: 2020-01-01
Attending: INTERNAL MEDICINE
Payer: MEDICARE

## 2020-01-01 ENCOUNTER — APPOINTMENT (OUTPATIENT)
Dept: OCCUPATIONAL THERAPY | Facility: CLINIC | Age: 75
DRG: 299 | End: 2020-01-01
Attending: INTERNAL MEDICINE
Payer: MEDICARE

## 2020-01-01 ENCOUNTER — APPOINTMENT (OUTPATIENT)
Dept: GENERAL RADIOLOGY | Facility: CLINIC | Age: 75
DRG: 299 | End: 2020-01-01
Attending: NURSE PRACTITIONER
Payer: MEDICARE

## 2020-01-01 ENCOUNTER — HOSPITAL ENCOUNTER (OUTPATIENT)
Dept: ULTRASOUND IMAGING | Facility: CLINIC | Age: 75
End: 2020-04-27
Attending: INTERNAL MEDICINE
Payer: MEDICARE

## 2020-01-01 ENCOUNTER — HOSPITAL ENCOUNTER (OUTPATIENT)
Facility: CLINIC | Age: 75
Discharge: HOME | End: 2020-08-10
Admitting: RADIOLOGY
Payer: MEDICARE

## 2020-01-01 ENCOUNTER — HOSPITAL ENCOUNTER (OUTPATIENT)
Facility: CLINIC | Age: 75
Discharge: HOME | End: 2020-12-07
Admitting: RADIOLOGY
Payer: MEDICARE

## 2020-01-01 ENCOUNTER — HOSPITAL ENCOUNTER (OUTPATIENT)
Dept: ULTRASOUND IMAGING | Facility: CLINIC | Age: 75
End: 2020-08-10
Attending: INTERNAL MEDICINE
Payer: MEDICARE

## 2020-01-01 ENCOUNTER — HOSPITAL ENCOUNTER (OUTPATIENT)
Facility: CLINIC | Age: 75
Discharge: HOME | End: 2020-09-14
Admitting: RADIOLOGY
Payer: MEDICARE

## 2020-01-01 ENCOUNTER — HOSPITAL ENCOUNTER (OUTPATIENT)
Dept: ULTRASOUND IMAGING | Facility: CLINIC | Age: 75
Discharge: HOME | End: 2020-09-14
Attending: INTERNAL MEDICINE | Admitting: INTERNAL MEDICINE
Payer: MEDICARE

## 2020-01-01 VITALS
SYSTOLIC BLOOD PRESSURE: 105 MMHG | DIASTOLIC BLOOD PRESSURE: 58 MMHG | WEIGHT: 225 LBS | OXYGEN SATURATION: 91 % | HEIGHT: 74 IN | RESPIRATION RATE: 17 BRPM | HEART RATE: 69 BPM | TEMPERATURE: 98.6 F | BODY MASS INDEX: 28.88 KG/M2

## 2020-01-01 VITALS — BODY MASS INDEX: 29.69 KG/M2 | WEIGHT: 225 LBS

## 2020-01-01 VITALS
OXYGEN SATURATION: 98 % | RESPIRATION RATE: 16 BRPM | SYSTOLIC BLOOD PRESSURE: 114 MMHG | TEMPERATURE: 97.9 F | HEART RATE: 64 BPM | DIASTOLIC BLOOD PRESSURE: 58 MMHG

## 2020-01-01 VITALS
WEIGHT: 225.97 LBS | DIASTOLIC BLOOD PRESSURE: 60 MMHG | BODY MASS INDEX: 29.01 KG/M2 | HEART RATE: 58 BPM | RESPIRATION RATE: 18 BRPM | SYSTOLIC BLOOD PRESSURE: 104 MMHG | TEMPERATURE: 98.2 F | OXYGEN SATURATION: 94 %

## 2020-01-01 VITALS
BODY MASS INDEX: 29.69 KG/M2 | HEART RATE: 71 BPM | DIASTOLIC BLOOD PRESSURE: 79 MMHG | OXYGEN SATURATION: 95 % | HEIGHT: 73 IN | TEMPERATURE: 97.8 F | RESPIRATION RATE: 18 BRPM | SYSTOLIC BLOOD PRESSURE: 132 MMHG

## 2020-01-01 VITALS
SYSTOLIC BLOOD PRESSURE: 129 MMHG | TEMPERATURE: 96.9 F | HEART RATE: 78 BPM | OXYGEN SATURATION: 98 % | DIASTOLIC BLOOD PRESSURE: 66 MMHG | RESPIRATION RATE: 16 BRPM

## 2020-01-01 VITALS
SYSTOLIC BLOOD PRESSURE: 120 MMHG | HEART RATE: 68 BPM | RESPIRATION RATE: 16 BRPM | OXYGEN SATURATION: 97 % | TEMPERATURE: 97.2 F | DIASTOLIC BLOOD PRESSURE: 71 MMHG

## 2020-01-01 VITALS
HEIGHT: 73 IN | DIASTOLIC BLOOD PRESSURE: 75 MMHG | SYSTOLIC BLOOD PRESSURE: 133 MMHG | TEMPERATURE: 97.5 F | HEART RATE: 73 BPM | BODY MASS INDEX: 29.82 KG/M2 | WEIGHT: 225 LBS | OXYGEN SATURATION: 94 %

## 2020-01-01 VITALS
OXYGEN SATURATION: 93 % | SYSTOLIC BLOOD PRESSURE: 139 MMHG | DIASTOLIC BLOOD PRESSURE: 72 MMHG | HEART RATE: 72 BPM | BODY MASS INDEX: 28.88 KG/M2 | WEIGHT: 225 LBS | TEMPERATURE: 96.6 F | HEIGHT: 74 IN | RESPIRATION RATE: 16 BRPM

## 2020-01-01 VITALS
BODY MASS INDEX: 29.82 KG/M2 | SYSTOLIC BLOOD PRESSURE: 113 MMHG | OXYGEN SATURATION: 96 % | HEART RATE: 74 BPM | RESPIRATION RATE: 18 BRPM | DIASTOLIC BLOOD PRESSURE: 68 MMHG | TEMPERATURE: 98.3 F | HEIGHT: 73 IN | WEIGHT: 225 LBS

## 2020-01-01 DIAGNOSIS — Z89.512 HISTORY OF LEFT BELOW KNEE AMPUTATION (H): Primary | ICD-10-CM

## 2020-01-01 DIAGNOSIS — K70.31 ALCOHOLIC CIRRHOSIS OF LIVER WITH ASCITES (H): ICD-10-CM

## 2020-01-01 DIAGNOSIS — T14.8XXA BLEEDING FROM WOUND: ICD-10-CM

## 2020-01-01 DIAGNOSIS — K70.30 ALCOHOLIC CIRRHOSIS OF LIVER (H): Primary | ICD-10-CM

## 2020-01-01 DIAGNOSIS — R53.2 COMPLETE IMMOBILITY DUE TO SEVERE PHYSICAL DISABILITY OR FRAILTY (H): ICD-10-CM

## 2020-01-01 DIAGNOSIS — Z11.59 ENCOUNTER FOR SCREENING FOR OTHER VIRAL DISEASES: ICD-10-CM

## 2020-01-01 DIAGNOSIS — F10.10 ALCOHOL ABUSE: ICD-10-CM

## 2020-01-01 DIAGNOSIS — Z11.59 ENCOUNTER FOR SCREENING FOR OTHER VIRAL DISEASES: Primary | ICD-10-CM

## 2020-01-01 DIAGNOSIS — K92.2 GASTROINTESTINAL HEMORRHAGE, UNSPECIFIED GASTROINTESTINAL HEMORRHAGE TYPE: Primary | ICD-10-CM

## 2020-01-01 DIAGNOSIS — K70.31 ALCOHOLIC CIRRHOSIS OF LIVER WITH ASCITES (H): Primary | ICD-10-CM

## 2020-01-01 DIAGNOSIS — R42 DIZZINESS: Primary | ICD-10-CM

## 2020-01-01 DIAGNOSIS — G60.9 PERIPHERAL NEUROPATHY, IDIOPATHIC: Primary | ICD-10-CM

## 2020-01-01 DIAGNOSIS — Z89.512 LEFT BELOW-KNEE AMPUTEE (H): ICD-10-CM

## 2020-01-01 DIAGNOSIS — G54.6 PHANTOM LIMB PAIN (H): Primary | ICD-10-CM

## 2020-01-01 DIAGNOSIS — Z71.89 OTHER SPECIFIED COUNSELING: Primary | Chronic | ICD-10-CM

## 2020-01-01 DIAGNOSIS — I25.119 CORONARY ARTERY DISEASE INVOLVING NATIVE HEART WITH ANGINA PECTORIS, UNSPECIFIED VESSEL OR LESION TYPE (H): ICD-10-CM

## 2020-01-01 DIAGNOSIS — D68.59 THROMBOPHILIA (H): ICD-10-CM

## 2020-01-01 DIAGNOSIS — K92.2 GASTROINTESTINAL HEMORRHAGE, UNSPECIFIED GASTROINTESTINAL HEMORRHAGE TYPE: ICD-10-CM

## 2020-01-01 DIAGNOSIS — R42 DIZZINESS: ICD-10-CM

## 2020-01-01 DIAGNOSIS — C83.00 MALIGNANT LYMPHOPLASMACYTIC LYMPHOMA (H): ICD-10-CM

## 2020-01-01 LAB
ABO + RH BLD: ABNORMAL
ABO + RH BLD: NORMAL
ALBUMIN SERPL-MCNC: 1.8 G/DL (ref 3.4–5)
ALBUMIN SERPL-MCNC: 2 G/DL (ref 3.4–5)
ALBUMIN SERPL-MCNC: 2.3 G/DL (ref 3.4–5)
ALP SERPL-CCNC: 51 U/L (ref 40–150)
ALP SERPL-CCNC: 57 U/L (ref 40–150)
ALP SERPL-CCNC: 60 U/L (ref 40–150)
ALT SERPL W P-5'-P-CCNC: 10 U/L (ref 0–70)
ALT SERPL W P-5'-P-CCNC: 10 U/L (ref 0–70)
ALT SERPL W P-5'-P-CCNC: 12 U/L (ref 0–70)
ANION GAP SERPL CALCULATED.3IONS-SCNC: 4 MMOL/L (ref 3–14)
ANION GAP SERPL CALCULATED.3IONS-SCNC: 5 MMOL/L (ref 3–14)
ANION GAP SERPL CALCULATED.3IONS-SCNC: 6 MMOL/L (ref 3–14)
ANION GAP SERPL CALCULATED.3IONS-SCNC: 6 MMOL/L (ref 3–14)
ANISOCYTOSIS BLD QL SMEAR: ABNORMAL
AST SERPL W P-5'-P-CCNC: 21 U/L (ref 0–45)
AST SERPL W P-5'-P-CCNC: 25 U/L (ref 0–45)
AST SERPL W P-5'-P-CCNC: 25 U/L (ref 0–45)
B2 GLYCOPROT1 IGG SERPL IA-ACNC: 1.6 U/ML
B2 GLYCOPROT1 IGM SERPL IA-ACNC: <2.9 U/ML
BASOPHILS # BLD AUTO: 0.1 10E9/L (ref 0–0.2)
BASOPHILS # BLD AUTO: 0.1 10E9/L (ref 0–0.2)
BASOPHILS NFR BLD AUTO: 1.3 %
BASOPHILS NFR BLD AUTO: 2.5 %
BILIRUB SERPL-MCNC: 0.6 MG/DL (ref 0.2–1.3)
BILIRUB SERPL-MCNC: 0.6 MG/DL (ref 0.2–1.3)
BILIRUB SERPL-MCNC: 0.7 MG/DL (ref 0.2–1.3)
BLD GP AB INVEST PLASRBC-IMP: ABNORMAL
BLD GP AB SCN SERPL QL: ABNORMAL
BLD GP AB SCN SERPL QL: ABNORMAL
BLD GP AB SCN SERPL QL: NORMAL
BLD GP AB SCN SERPL QL: NORMAL
BLD PROD TYP BPU: ABNORMAL
BLD PROD TYP BPU: ABNORMAL
BLD PROD TYP BPU: NORMAL
BLD UNIT ID BPU: 0
BLOOD BANK CMNT PATIENT-IMP: ABNORMAL
BLOOD BANK CMNT PATIENT-IMP: NORMAL
BLOOD BANK CMNT PATIENT-IMP: NORMAL
BLOOD PRODUCT CODE: NORMAL
BPU ID: NORMAL
BUN SERPL-MCNC: 20 MG/DL (ref 7–30)
BUN SERPL-MCNC: 21 MG/DL (ref 7–30)
BUN SERPL-MCNC: 22 MG/DL (ref 7–30)
BUN SERPL-MCNC: 22 MG/DL (ref 7–30)
BUN SERPL-MCNC: 23 MG/DL (ref 7–30)
BUN SERPL-MCNC: 24 MG/DL (ref 7–30)
BUN SERPL-MCNC: 25 MG/DL (ref 7–30)
BUN SERPL-MCNC: 27 MG/DL (ref 7–30)
BUN SERPL-MCNC: 28 MG/DL (ref 7–30)
BUN SERPL-MCNC: 30 MG/DL (ref 7–30)
CALCIUM SERPL-MCNC: 7.6 MG/DL (ref 8.5–10.1)
CALCIUM SERPL-MCNC: 7.7 MG/DL (ref 8.5–10.1)
CALCIUM SERPL-MCNC: 7.7 MG/DL (ref 8.5–10.1)
CALCIUM SERPL-MCNC: 7.8 MG/DL (ref 8.5–10.1)
CALCIUM SERPL-MCNC: 7.8 MG/DL (ref 8.5–10.1)
CALCIUM SERPL-MCNC: 7.9 MG/DL (ref 8.5–10.1)
CALCIUM SERPL-MCNC: 8 MG/DL (ref 8.5–10.1)
CALCIUM SERPL-MCNC: 8 MG/DL (ref 8.5–10.1)
CALCIUM SERPL-MCNC: 8.1 MG/DL (ref 8.5–10.1)
CALCIUM SERPL-MCNC: 8.1 MG/DL (ref 8.5–10.1)
CALCIUM SERPL-MCNC: 8.2 MG/DL (ref 8.5–10.1)
CALCIUM SERPL-MCNC: 8.4 MG/DL (ref 8.5–10.1)
CARDIOLIPIN ANTIBODY IGG: <1.6 GPL-U/ML (ref 0–19.9)
CARDIOLIPIN ANTIBODY IGM: 0.7 MPL-U/ML (ref 0–19.9)
CHLORIDE SERPL-SCNC: 102 MMOL/L (ref 94–109)
CHLORIDE SERPL-SCNC: 103 MMOL/L (ref 94–109)
CHLORIDE SERPL-SCNC: 104 MMOL/L (ref 94–109)
CHLORIDE SERPL-SCNC: 104 MMOL/L (ref 94–109)
CHLORIDE SERPL-SCNC: 105 MMOL/L (ref 94–109)
CHLORIDE SERPL-SCNC: 106 MMOL/L (ref 94–109)
CHLORIDE SERPL-SCNC: 107 MMOL/L (ref 94–109)
CHLORIDE SERPL-SCNC: 109 MMOL/L (ref 94–109)
CHLORIDE SERPL-SCNC: 110 MMOL/L (ref 94–109)
CHLORIDE SERPL-SCNC: 111 MMOL/L (ref 94–109)
CHLORIDE SERPL-SCNC: 111 MMOL/L (ref 94–109)
CHLORIDE SERPL-SCNC: 112 MMOL/L (ref 94–109)
CO2 SERPL-SCNC: 24 MMOL/L (ref 20–32)
CO2 SERPL-SCNC: 25 MMOL/L (ref 20–32)
CO2 SERPL-SCNC: 26 MMOL/L (ref 20–32)
CO2 SERPL-SCNC: 27 MMOL/L (ref 20–32)
CO2 SERPL-SCNC: 28 MMOL/L (ref 20–32)
CO2 SERPL-SCNC: 28 MMOL/L (ref 20–32)
COLONOSCOPY: NORMAL
COPATH REPORT: NORMAL
CREAT SERPL-MCNC: 1.3 MG/DL (ref 0.66–1.25)
CREAT SERPL-MCNC: 1.47 MG/DL (ref 0.66–1.25)
CREAT SERPL-MCNC: 1.5 MG/DL (ref 0.66–1.25)
CREAT SERPL-MCNC: 1.5 MG/DL (ref 0.66–1.25)
CREAT SERPL-MCNC: 1.51 MG/DL (ref 0.66–1.25)
CREAT SERPL-MCNC: 1.53 MG/DL (ref 0.66–1.25)
CREAT SERPL-MCNC: 1.62 MG/DL (ref 0.66–1.25)
CREAT SERPL-MCNC: 1.63 MG/DL (ref 0.66–1.25)
CREAT SERPL-MCNC: 1.64 MG/DL (ref 0.66–1.25)
CREAT SERPL-MCNC: 1.64 MG/DL (ref 0.66–1.25)
CREAT SERPL-MCNC: 1.67 MG/DL (ref 0.66–1.25)
CREAT SERPL-MCNC: 1.68 MG/DL (ref 0.66–1.25)
CREAT SERPL-MCNC: 1.7 MG/DL (ref 0.66–1.25)
CREAT SERPL-MCNC: 1.84 MG/DL (ref 0.66–1.25)
DAT IGG-SP REAG RBC-IMP: NORMAL
DIFFERENTIAL METHOD BLD: ABNORMAL
DIFFERENTIAL METHOD BLD: ABNORMAL
ELLIPTOCYTES BLD QL SMEAR: SLIGHT
EOSINOPHIL # BLD AUTO: 0.1 10E9/L (ref 0–0.7)
EOSINOPHIL # BLD AUTO: 0.1 10E9/L (ref 0–0.7)
EOSINOPHIL NFR BLD AUTO: 2 %
EOSINOPHIL NFR BLD AUTO: 2.7 %
ERYTHROCYTE [DISTWIDTH] IN BLOOD BY AUTOMATED COUNT: 16.3 % (ref 10–15)
ERYTHROCYTE [DISTWIDTH] IN BLOOD BY AUTOMATED COUNT: 17.3 % (ref 10–15)
ERYTHROCYTE [DISTWIDTH] IN BLOOD BY AUTOMATED COUNT: 17.5 % (ref 10–15)
ERYTHROCYTE [DISTWIDTH] IN BLOOD BY AUTOMATED COUNT: 18.2 % (ref 10–15)
ERYTHROCYTE [DISTWIDTH] IN BLOOD BY AUTOMATED COUNT: 18.5 % (ref 10–15)
ERYTHROCYTE [DISTWIDTH] IN BLOOD BY AUTOMATED COUNT: 19 % (ref 10–15)
ERYTHROCYTE [DISTWIDTH] IN BLOOD BY AUTOMATED COUNT: 19.4 % (ref 10–15)
ERYTHROCYTE [DISTWIDTH] IN BLOOD BY AUTOMATED COUNT: 19.6 % (ref 10–15)
ERYTHROCYTE [DISTWIDTH] IN BLOOD BY AUTOMATED COUNT: 19.6 % (ref 10–15)
ERYTHROCYTE [DISTWIDTH] IN BLOOD BY AUTOMATED COUNT: 19.8 % (ref 10–15)
ERYTHROCYTE [DISTWIDTH] IN BLOOD BY AUTOMATED COUNT: 20 % (ref 10–15)
ERYTHROCYTE [DISTWIDTH] IN BLOOD BY AUTOMATED COUNT: 20.3 % (ref 10–15)
ERYTHROCYTE [DISTWIDTH] IN BLOOD BY AUTOMATED COUNT: 20.4 % (ref 10–15)
FOLATE SERPL-MCNC: 11.3 NG/ML
GFR SERPL CREATININE-BSD FRML MDRD: 35 ML/MIN/{1.73_M2}
GFR SERPL CREATININE-BSD FRML MDRD: 38 ML/MIN/{1.73_M2}
GFR SERPL CREATININE-BSD FRML MDRD: 39 ML/MIN/{1.73_M2}
GFR SERPL CREATININE-BSD FRML MDRD: 39 ML/MIN/{1.73_M2}
GFR SERPL CREATININE-BSD FRML MDRD: 40 ML/MIN/{1.73_M2}
GFR SERPL CREATININE-BSD FRML MDRD: 43 ML/MIN/{1.73_M2}
GFR SERPL CREATININE-BSD FRML MDRD: 44 ML/MIN/{1.73_M2}
GFR SERPL CREATININE-BSD FRML MDRD: 45 ML/MIN/{1.73_M2}
GFR SERPL CREATININE-BSD FRML MDRD: 53 ML/MIN/{1.73_M2}
GLUCOSE BLDC GLUCOMTR-MCNC: 135 MG/DL (ref 70–99)
GLUCOSE BLDC GLUCOMTR-MCNC: 84 MG/DL (ref 70–99)
GLUCOSE BLDC GLUCOMTR-MCNC: 97 MG/DL (ref 70–99)
GLUCOSE BLDC GLUCOMTR-MCNC: 99 MG/DL (ref 70–99)
GLUCOSE SERPL-MCNC: 107 MG/DL (ref 70–99)
GLUCOSE SERPL-MCNC: 113 MG/DL (ref 70–99)
GLUCOSE SERPL-MCNC: 118 MG/DL (ref 70–99)
GLUCOSE SERPL-MCNC: 139 MG/DL (ref 70–99)
GLUCOSE SERPL-MCNC: 153 MG/DL (ref 70–99)
GLUCOSE SERPL-MCNC: 68 MG/DL (ref 70–99)
GLUCOSE SERPL-MCNC: 74 MG/DL (ref 70–99)
GLUCOSE SERPL-MCNC: 77 MG/DL (ref 70–99)
GLUCOSE SERPL-MCNC: 78 MG/DL (ref 70–99)
GLUCOSE SERPL-MCNC: 83 MG/DL (ref 70–99)
GLUCOSE SERPL-MCNC: 87 MG/DL (ref 70–99)
GLUCOSE SERPL-MCNC: 88 MG/DL (ref 70–99)
GLUCOSE SERPL-MCNC: 91 MG/DL (ref 70–99)
GLUCOSE SERPL-MCNC: 93 MG/DL (ref 70–99)
HAPTOGLOB SERPL-MCNC: 67 MG/DL (ref 32–197)
HCT VFR BLD AUTO: 20.1 % (ref 40–53)
HCT VFR BLD AUTO: 20.3 % (ref 40–53)
HCT VFR BLD AUTO: 20.4 % (ref 40–53)
HCT VFR BLD AUTO: 21.1 % (ref 40–53)
HCT VFR BLD AUTO: 22 % (ref 40–53)
HCT VFR BLD AUTO: 22.6 % (ref 40–53)
HCT VFR BLD AUTO: 22.9 % (ref 40–53)
HCT VFR BLD AUTO: 23.1 % (ref 40–53)
HCT VFR BLD AUTO: 23.3 % (ref 40–53)
HCT VFR BLD AUTO: 23.8 % (ref 40–53)
HCT VFR BLD AUTO: 23.8 % (ref 40–53)
HCT VFR BLD AUTO: 24.1 % (ref 40–53)
HCT VFR BLD AUTO: 24.3 % (ref 40–53)
HCT VFR BLD AUTO: 25.9 % (ref 40–53)
HCT VFR BLD AUTO: 33 % (ref 40–53)
HGB BLD-MCNC: 10.5 G/DL (ref 13.3–17.7)
HGB BLD-MCNC: 6.1 G/DL (ref 13.3–17.7)
HGB BLD-MCNC: 6.1 G/DL (ref 13.3–17.7)
HGB BLD-MCNC: 6.2 G/DL (ref 13.3–17.7)
HGB BLD-MCNC: 6.3 G/DL (ref 13.3–17.7)
HGB BLD-MCNC: 6.4 G/DL (ref 13.3–17.7)
HGB BLD-MCNC: 6.4 G/DL (ref 13.3–17.7)
HGB BLD-MCNC: 6.8 G/DL (ref 13.3–17.7)
HGB BLD-MCNC: 6.9 G/DL (ref 13.3–17.7)
HGB BLD-MCNC: 7 G/DL (ref 13.3–17.7)
HGB BLD-MCNC: 7.1 G/DL (ref 13.3–17.7)
HGB BLD-MCNC: 7.1 G/DL (ref 13.3–17.7)
HGB BLD-MCNC: 7.2 G/DL (ref 13.3–17.7)
HGB BLD-MCNC: 7.3 G/DL (ref 13.3–17.7)
HGB BLD-MCNC: 7.4 G/DL (ref 13.3–17.7)
HGB BLD-MCNC: 7.5 G/DL (ref 13.3–17.7)
HGB BLD-MCNC: 7.6 G/DL (ref 13.3–17.7)
HGB BLD-MCNC: 7.7 G/DL (ref 13.3–17.7)
HGB BLD-MCNC: 7.8 G/DL (ref 13.3–17.7)
HGB BLD-MCNC: 7.8 G/DL (ref 13.3–17.7)
HGB BLD-MCNC: 7.9 G/DL (ref 13.3–17.7)
HGB BLD-MCNC: 7.9 G/DL (ref 13.3–17.7)
HGB BLD-MCNC: 8 G/DL (ref 13.3–17.7)
HGB BLD-MCNC: 8.3 G/DL (ref 13.3–17.7)
HGB BLD-MCNC: 8.5 G/DL (ref 13.3–17.7)
HGB BLD-MCNC: 9.6 G/DL (ref 13.3–17.7)
HGB BLD-MCNC: NORMAL G/DL (ref 13.3–17.7)
HGB BLD-MCNC: NORMAL G/DL (ref 13.3–17.7)
HYPOCHROMIA BLD QL: PRESENT
IMM GRANULOCYTES # BLD: 0 10E9/L (ref 0–0.4)
IMM GRANULOCYTES # BLD: 0 10E9/L (ref 0–0.4)
IMM GRANULOCYTES NFR BLD: 0.1 %
IMM GRANULOCYTES NFR BLD: 0.5 %
INR PPP: 1.26 (ref 0.86–1.14)
INR PPP: 1.34 (ref 0.86–1.14)
INR PPP: 1.37 (ref 0.86–1.14)
INR PPP: 1.38 (ref 0.86–1.14)
INR PPP: 1.4 (ref 0.86–1.14)
INR PPP: 1.48 (ref 0.86–1.14)
INR PPP: 1.54 (ref 0.86–1.14)
INR PPP: 1.66 (ref 0.86–1.14)
LA PPP-IMP: POSITIVE
LACTATE BLD-SCNC: 1 MMOL/L (ref 0.7–2)
LACTATE BLD-SCNC: 2 MMOL/L (ref 0.7–2)
LDH SERPL L TO P-CCNC: 167 U/L (ref 85–227)
LYMPHOCYTES # BLD AUTO: 0.5 10E9/L (ref 0.8–5.3)
LYMPHOCYTES # BLD AUTO: 0.8 10E9/L (ref 0.8–5.3)
LYMPHOCYTES NFR BLD AUTO: 18.9 %
LYMPHOCYTES NFR BLD AUTO: 7.1 %
MAGNESIUM SERPL-MCNC: 2.4 MG/DL (ref 1.6–2.3)
MCH RBC QN AUTO: 25.6 PG (ref 26.5–33)
MCH RBC QN AUTO: 25.7 PG (ref 26.5–33)
MCH RBC QN AUTO: 26 PG (ref 26.5–33)
MCH RBC QN AUTO: 26.2 PG (ref 26.5–33)
MCH RBC QN AUTO: 26.3 PG (ref 26.5–33)
MCH RBC QN AUTO: 26.5 PG (ref 26.5–33)
MCH RBC QN AUTO: 26.8 PG (ref 26.5–33)
MCH RBC QN AUTO: 27.3 PG (ref 26.5–33)
MCH RBC QN AUTO: 27.6 PG (ref 26.5–33)
MCH RBC QN AUTO: 27.7 PG (ref 26.5–33)
MCH RBC QN AUTO: 27.9 PG (ref 26.5–33)
MCH RBC QN AUTO: 28 PG (ref 26.5–33)
MCH RBC QN AUTO: 28.1 PG (ref 26.5–33)
MCHC RBC AUTO-ENTMCNC: 30.3 G/DL (ref 31.5–36.5)
MCHC RBC AUTO-ENTMCNC: 30.3 G/DL (ref 31.5–36.5)
MCHC RBC AUTO-ENTMCNC: 30.5 G/DL (ref 31.5–36.5)
MCHC RBC AUTO-ENTMCNC: 30.5 G/DL (ref 31.5–36.5)
MCHC RBC AUTO-ENTMCNC: 30.7 G/DL (ref 31.5–36.5)
MCHC RBC AUTO-ENTMCNC: 30.9 G/DL (ref 31.5–36.5)
MCHC RBC AUTO-ENTMCNC: 31.2 G/DL (ref 31.5–36.5)
MCHC RBC AUTO-ENTMCNC: 31.4 G/DL (ref 31.5–36.5)
MCHC RBC AUTO-ENTMCNC: 31.4 G/DL (ref 31.5–36.5)
MCHC RBC AUTO-ENTMCNC: 31.5 G/DL (ref 31.5–36.5)
MCHC RBC AUTO-ENTMCNC: 31.5 G/DL (ref 31.5–36.5)
MCHC RBC AUTO-ENTMCNC: 31.8 G/DL (ref 31.5–36.5)
MCV RBC AUTO: 82 FL (ref 78–100)
MCV RBC AUTO: 82 FL (ref 78–100)
MCV RBC AUTO: 83 FL (ref 78–100)
MCV RBC AUTO: 84 FL (ref 78–100)
MCV RBC AUTO: 85 FL (ref 78–100)
MCV RBC AUTO: 86 FL (ref 78–100)
MCV RBC AUTO: 87 FL (ref 78–100)
MCV RBC AUTO: 89 FL (ref 78–100)
MCV RBC AUTO: 90 FL (ref 78–100)
MCV RBC AUTO: 90 FL (ref 78–100)
MCV RBC AUTO: 91 FL (ref 78–100)
MCV RBC AUTO: 91 FL (ref 78–100)
MICROCYTES BLD QL SMEAR: PRESENT
MONOCYTES # BLD AUTO: 0.1 10E9/L (ref 0–1.3)
MONOCYTES # BLD AUTO: 0.8 10E9/L (ref 0–1.3)
MONOCYTES NFR BLD AUTO: 11.8 %
MONOCYTES NFR BLD AUTO: 3 %
NEUTROPHILS # BLD AUTO: 3.2 10E9/L (ref 1.6–8.3)
NEUTROPHILS # BLD AUTO: 5.4 10E9/L (ref 1.6–8.3)
NEUTROPHILS NFR BLD AUTO: 72.4 %
NEUTROPHILS NFR BLD AUTO: 77.7 %
NRBC # BLD AUTO: 0 10*3/UL
NRBC # BLD AUTO: 0 10*3/UL
NRBC BLD AUTO-RTO: 0 /100
NRBC BLD AUTO-RTO: 0 /100
NUM BPU REQUESTED: 0
NUM BPU REQUESTED: 1
NUM BPU REQUESTED: 3
NUM BPU REQUESTED: 5
PHOSPHATE SERPL-MCNC: 2.5 MG/DL (ref 2.5–4.5)
PLATELET # BLD AUTO: 101 10E9/L (ref 150–450)
PLATELET # BLD AUTO: 117 10E9/L (ref 150–450)
PLATELET # BLD AUTO: 118 10E9/L (ref 150–450)
PLATELET # BLD AUTO: 121 10E9/L (ref 150–450)
PLATELET # BLD AUTO: 126 10E9/L (ref 150–450)
PLATELET # BLD AUTO: 136 10E9/L (ref 150–450)
PLATELET # BLD AUTO: 144 10E9/L (ref 150–450)
PLATELET # BLD AUTO: 146 10E9/L (ref 150–450)
PLATELET # BLD AUTO: 146 10E9/L (ref 150–450)
PLATELET # BLD AUTO: 148 10E9/L (ref 150–450)
PLATELET # BLD AUTO: 148 10E9/L (ref 150–450)
PLATELET # BLD AUTO: 153 10E9/L (ref 150–450)
PLATELET # BLD AUTO: 153 10E9/L (ref 150–450)
PLATELET # BLD AUTO: 160 10E9/L (ref 150–450)
PLATELET # BLD AUTO: 162 10E9/L (ref 150–450)
PLATELET # BLD AUTO: 167 10E9/L (ref 150–450)
PLATELET # BLD AUTO: 173 10E9/L (ref 150–450)
PLATELET # BLD AUTO: 77 10E9/L (ref 150–450)
PLATELET # BLD EST: ABNORMAL 10*3/UL
POTASSIUM SERPL-SCNC: 3.7 MMOL/L (ref 3.4–5.3)
POTASSIUM SERPL-SCNC: 3.7 MMOL/L (ref 3.4–5.3)
POTASSIUM SERPL-SCNC: 3.8 MMOL/L (ref 3.4–5.3)
POTASSIUM SERPL-SCNC: 4 MMOL/L (ref 3.4–5.3)
POTASSIUM SERPL-SCNC: 4.1 MMOL/L (ref 3.4–5.3)
POTASSIUM SERPL-SCNC: 4.2 MMOL/L (ref 3.4–5.3)
POTASSIUM SERPL-SCNC: 4.2 MMOL/L (ref 3.4–5.3)
POTASSIUM SERPL-SCNC: 4.3 MMOL/L (ref 3.4–5.3)
POTASSIUM SERPL-SCNC: 4.4 MMOL/L (ref 3.4–5.3)
POTASSIUM SERPL-SCNC: 4.4 MMOL/L (ref 3.4–5.3)
POTASSIUM SERPL-SCNC: 4.5 MMOL/L (ref 3.4–5.3)
POTASSIUM SERPL-SCNC: 4.9 MMOL/L (ref 3.4–5.3)
PROT SERPL-MCNC: 4.6 G/DL (ref 6.8–8.8)
PROT SERPL-MCNC: 5.4 G/DL (ref 6.8–8.8)
PROT SERPL-MCNC: 5.5 G/DL (ref 6.8–8.8)
PROVATION GI EXAM: NORMAL
PROVATION GI EXAM: NORMAL
RBC # BLD AUTO: 2.28 10E12/L (ref 4.4–5.9)
RBC # BLD AUTO: 2.32 10E12/L (ref 4.4–5.9)
RBC # BLD AUTO: 2.34 10E12/L (ref 4.4–5.9)
RBC # BLD AUTO: 2.44 10E12/L (ref 4.4–5.9)
RBC # BLD AUTO: 2.49 10E12/L (ref 4.4–5.9)
RBC # BLD AUTO: 2.53 10E12/L (ref 4.4–5.9)
RBC # BLD AUTO: 2.58 10E12/L (ref 4.4–5.9)
RBC # BLD AUTO: 2.67 10E12/L (ref 4.4–5.9)
RBC # BLD AUTO: 2.68 10E12/L (ref 4.4–5.9)
RBC # BLD AUTO: 2.8 10E12/L (ref 4.4–5.9)
RBC # BLD AUTO: 2.81 10E12/L (ref 4.4–5.9)
RBC # BLD AUTO: 2.81 10E12/L (ref 4.4–5.9)
RBC # BLD AUTO: 2.88 10E12/L (ref 4.4–5.9)
RBC # BLD AUTO: 3.04 10E12/L (ref 4.4–5.9)
RBC # BLD AUTO: 3.92 10E12/L (ref 4.4–5.9)
RETICS # AUTO: 150.8 10E9/L (ref 25–95)
RETICS/RBC NFR AUTO: 5 % (ref 0.5–2)
SARS-COV-2 RNA SPEC QL NAA+PROBE: NOT DETECTED
SODIUM SERPL-SCNC: 132 MMOL/L (ref 133–144)
SODIUM SERPL-SCNC: 134 MMOL/L (ref 133–144)
SODIUM SERPL-SCNC: 135 MMOL/L (ref 133–144)
SODIUM SERPL-SCNC: 136 MMOL/L (ref 133–144)
SODIUM SERPL-SCNC: 137 MMOL/L (ref 133–144)
SODIUM SERPL-SCNC: 137 MMOL/L (ref 133–144)
SODIUM SERPL-SCNC: 138 MMOL/L (ref 133–144)
SODIUM SERPL-SCNC: 140 MMOL/L (ref 133–144)
SODIUM SERPL-SCNC: 140 MMOL/L (ref 133–144)
SODIUM SERPL-SCNC: 141 MMOL/L (ref 133–144)
SODIUM SERPL-SCNC: 141 MMOL/L (ref 133–144)
SODIUM SERPL-SCNC: 143 MMOL/L (ref 133–144)
SPECIMEN EXP DATE BLD: ABNORMAL
SPECIMEN EXP DATE BLD: ABNORMAL
SPECIMEN EXP DATE BLD: NORMAL
SPECIMEN EXP DATE BLD: NORMAL
SPECIMEN SOURCE: NORMAL
TRANSFUSION STATUS PATIENT QL: NORMAL
UPPER GI ENDOSCOPY: NORMAL
VIT B12 SERPL-MCNC: 1183 PG/ML (ref 193–986)
WBC # BLD AUTO: 3.3 10E9/L (ref 4–11)
WBC # BLD AUTO: 3.8 10E9/L (ref 4–11)
WBC # BLD AUTO: 4 10E9/L (ref 4–11)
WBC # BLD AUTO: 4 10E9/L (ref 4–11)
WBC # BLD AUTO: 4.1 10E9/L (ref 4–11)
WBC # BLD AUTO: 4.3 10E9/L (ref 4–11)
WBC # BLD AUTO: 4.4 10E9/L (ref 4–11)
WBC # BLD AUTO: 4.5 10E9/L (ref 4–11)
WBC # BLD AUTO: 4.6 10E9/L (ref 4–11)
WBC # BLD AUTO: 4.7 10E9/L (ref 4–11)
WBC # BLD AUTO: 5.5 10E9/L (ref 4–11)
WBC # BLD AUTO: 6.9 10E9/L (ref 4–11)
WBC # BLD AUTO: 7.6 10E9/L (ref 4–11)

## 2020-01-01 PROCEDURE — 85027 COMPLETE CBC AUTOMATED: CPT | Performed by: STUDENT IN AN ORGANIZED HEALTH CARE EDUCATION/TRAINING PROGRAM

## 2020-01-01 PROCEDURE — 25000128 H RX IP 250 OP 636: Performed by: INTERNAL MEDICINE

## 2020-01-01 PROCEDURE — 86922 COMPATIBILITY TEST ANTIGLOB: CPT | Performed by: INTERNAL MEDICINE

## 2020-01-01 PROCEDURE — 85610 PROTHROMBIN TIME: CPT | Performed by: INTERNAL MEDICINE

## 2020-01-01 PROCEDURE — 85018 HEMOGLOBIN: CPT | Performed by: INTERNAL MEDICINE

## 2020-01-01 PROCEDURE — 36415 COLL VENOUS BLD VENIPUNCTURE: CPT | Performed by: INTERNAL MEDICINE

## 2020-01-01 PROCEDURE — 45388 COLONOSCOPY W/ABLATION: CPT | Performed by: INTERNAL MEDICINE

## 2020-01-01 PROCEDURE — 85049 AUTOMATED PLATELET COUNT: CPT | Performed by: NURSE PRACTITIONER

## 2020-01-01 PROCEDURE — 00000167 ZZHCL STATISTIC INR NC: Performed by: NURSE PRACTITIONER

## 2020-01-01 PROCEDURE — 25000132 ZZH RX MED GY IP 250 OP 250 PS 637: Mod: GY | Performed by: INTERNAL MEDICINE

## 2020-01-01 PROCEDURE — P9047 ALBUMIN (HUMAN), 25%, 50ML: HCPCS | Performed by: INTERNAL MEDICINE

## 2020-01-01 PROCEDURE — 27210893 ZZH CATH CR5

## 2020-01-01 PROCEDURE — 27210886 ZZH ACCESSORY CR5

## 2020-01-01 PROCEDURE — 44366 SMALL BOWEL ENDOSCOPY: CPT | Performed by: INTERNAL MEDICINE

## 2020-01-01 PROCEDURE — 25000132 ZZH RX MED GY IP 250 OP 250 PS 637: Mod: GY | Performed by: PHYSICIAN ASSISTANT

## 2020-01-01 PROCEDURE — 85018 HEMOGLOBIN: CPT | Performed by: HOSPITALIST

## 2020-01-01 PROCEDURE — 25000125 ZZHC RX 250: Performed by: INTERNAL MEDICINE

## 2020-01-01 PROCEDURE — 85018 HEMOGLOBIN: CPT | Performed by: RADIOLOGY

## 2020-01-01 PROCEDURE — 99291 CRITICAL CARE FIRST HOUR: CPT | Performed by: NURSE PRACTITIONER

## 2020-01-01 PROCEDURE — 99232 SBSQ HOSP IP/OBS MODERATE 35: CPT | Performed by: STUDENT IN AN ORGANIZED HEALTH CARE EDUCATION/TRAINING PROGRAM

## 2020-01-01 PROCEDURE — 99233 SBSQ HOSP IP/OBS HIGH 50: CPT | Performed by: INTERNAL MEDICINE

## 2020-01-01 PROCEDURE — P9041 ALBUMIN (HUMAN),5%, 50ML: HCPCS | Performed by: INTERNAL MEDICINE

## 2020-01-01 PROCEDURE — 85025 COMPLETE CBC W/AUTO DIFF WBC: CPT | Performed by: INTERNAL MEDICINE

## 2020-01-01 PROCEDURE — 97530 THERAPEUTIC ACTIVITIES: CPT | Mod: GP

## 2020-01-01 PROCEDURE — 25000132 ZZH RX MED GY IP 250 OP 250 PS 637: Mod: GY | Performed by: NURSE PRACTITIONER

## 2020-01-01 PROCEDURE — 12000047 ZZH R&B IMCU

## 2020-01-01 PROCEDURE — 86850 RBC ANTIBODY SCREEN: CPT | Performed by: INTERNAL MEDICINE

## 2020-01-01 PROCEDURE — 99443 ZZC PHYSICIAN TELEPHONE EVALUATION 21-30 MIN: CPT | Performed by: INTERNAL MEDICINE

## 2020-01-01 PROCEDURE — 3E0H8GC INTRODUCTION OF OTHER THERAPEUTIC SUBSTANCE INTO LOWER GI, VIA NATURAL OR ARTIFICIAL OPENING ENDOSCOPIC: ICD-10-PCS | Performed by: INTERNAL MEDICINE

## 2020-01-01 PROCEDURE — P9016 RBC LEUKOCYTES REDUCED: HCPCS | Performed by: INTERNAL MEDICINE

## 2020-01-01 PROCEDURE — 85060 BLOOD SMEAR INTERPRETATION: CPT | Performed by: NURSE PRACTITIONER

## 2020-01-01 PROCEDURE — 37000008 ZZH ANESTHESIA TECHNICAL FEE, 1ST 30 MIN: Performed by: INTERNAL MEDICINE

## 2020-01-01 PROCEDURE — 40000010 ZZH STATISTIC ANES STAT CODE-CRNA PER MINUTE: Performed by: INTERNAL MEDICINE

## 2020-01-01 PROCEDURE — 85613 RUSSELL VIPER VENOM DILUTED: CPT | Performed by: NURSE PRACTITIONER

## 2020-01-01 PROCEDURE — 40000556 ZZH STATISTIC PERIPHERAL IV START W US GUIDANCE

## 2020-01-01 PROCEDURE — 80048 BASIC METABOLIC PNL TOTAL CA: CPT | Performed by: HOSPITALIST

## 2020-01-01 PROCEDURE — 80048 BASIC METABOLIC PNL TOTAL CA: CPT | Performed by: STUDENT IN AN ORGANIZED HEALTH CARE EDUCATION/TRAINING PROGRAM

## 2020-01-01 PROCEDURE — 85045 AUTOMATED RETICULOCYTE COUNT: CPT | Performed by: INTERNAL MEDICINE

## 2020-01-01 PROCEDURE — 25000125 ZZHC RX 250: Performed by: PHYSICIAN ASSISTANT

## 2020-01-01 PROCEDURE — A9560 TC99M LABELED RBC: HCPCS | Performed by: INTERNAL MEDICINE

## 2020-01-01 PROCEDURE — 99239 HOSP IP/OBS DSCHRG MGMT >30: CPT | Performed by: STUDENT IN AN ORGANIZED HEALTH CARE EDUCATION/TRAINING PROGRAM

## 2020-01-01 PROCEDURE — 83605 ASSAY OF LACTIC ACID: CPT | Performed by: NURSE PRACTITIONER

## 2020-01-01 PROCEDURE — 40000170 ZZH STATISTIC PRE-PROCEDURE ASSESSMENT II: Performed by: INTERNAL MEDICINE

## 2020-01-01 PROCEDURE — 80048 BASIC METABOLIC PNL TOTAL CA: CPT | Performed by: INTERNAL MEDICINE

## 2020-01-01 PROCEDURE — 97116 GAIT TRAINING THERAPY: CPT | Mod: GP

## 2020-01-01 PROCEDURE — 36415 COLL VENOUS BLD VENIPUNCTURE: CPT | Performed by: RADIOLOGY

## 2020-01-01 PROCEDURE — 36415 COLL VENOUS BLD VENIPUNCTURE: CPT | Performed by: HOSPITALIST

## 2020-01-01 PROCEDURE — 99232 SBSQ HOSP IP/OBS MODERATE 35: CPT | Performed by: INTERNAL MEDICINE

## 2020-01-01 PROCEDURE — 85027 COMPLETE CBC AUTOMATED: CPT | Performed by: INTERNAL MEDICINE

## 2020-01-01 PROCEDURE — 272N000021 US PARACENTESIS

## 2020-01-01 PROCEDURE — 85610 PROTHROMBIN TIME: CPT | Performed by: PHYSICIAN ASSISTANT

## 2020-01-01 PROCEDURE — 25000128 H RX IP 250 OP 636: Performed by: ANESTHESIOLOGY

## 2020-01-01 PROCEDURE — 40000847 ZZHCL STATISTIC MORPHOLOGY W/INTERP HISTOLOGY TC 85060: Performed by: NURSE PRACTITIONER

## 2020-01-01 PROCEDURE — 86901 BLOOD TYPING SEROLOGIC RH(D): CPT | Performed by: INTERNAL MEDICINE

## 2020-01-01 PROCEDURE — 12000000 ZZH R&B MED SURG/OB

## 2020-01-01 PROCEDURE — 80048 BASIC METABOLIC PNL TOTAL CA: CPT | Performed by: RADIOLOGY

## 2020-01-01 PROCEDURE — 86900 BLOOD TYPING SEROLOGIC ABO: CPT | Performed by: INTERNAL MEDICINE

## 2020-01-01 PROCEDURE — 76705 ECHO EXAM OF ABDOMEN: CPT

## 2020-01-01 PROCEDURE — 25800030 ZZH RX IP 258 OP 636: Performed by: NURSE ANESTHETIST, CERTIFIED REGISTERED

## 2020-01-01 PROCEDURE — G0500 MOD SEDAT ENDO SERVICE >5YRS: HCPCS | Performed by: INTERNAL MEDICINE

## 2020-01-01 PROCEDURE — 25000132 ZZH RX MED GY IP 250 OP 250 PS 637: Mod: GY | Performed by: HOSPITALIST

## 2020-01-01 PROCEDURE — 40000863 ZZH STATISTIC RADIOLOGY XRAY, US, CT, MAR, NM

## 2020-01-01 PROCEDURE — U0003 INFECTIOUS AGENT DETECTION BY NUCLEIC ACID (DNA OR RNA); SEVERE ACUTE RESPIRATORY SYNDROME CORONAVIRUS 2 (SARS-COV-2) (CORONAVIRUS DISEASE [COVID-19]), AMPLIFIED PROBE TECHNIQUE, MAKING USE OF HIGH THROUGHPUT TECHNOLOGIES AS DESCRIBED BY CMS-2020-01-R: HCPCS | Performed by: INTERNAL MEDICINE

## 2020-01-01 PROCEDURE — 99232 SBSQ HOSP IP/OBS MODERATE 35: CPT | Performed by: HOSPITALIST

## 2020-01-01 PROCEDURE — 49083 ABD PARACENTESIS W/IMAGING: CPT

## 2020-01-01 PROCEDURE — 36000058 ZZH SURGERY LEVEL 3 EA 15 ADDTL MIN: Performed by: INTERNAL MEDICINE

## 2020-01-01 PROCEDURE — 25000125 ZZHC RX 250: Performed by: RADIOLOGY

## 2020-01-01 PROCEDURE — 85610 PROTHROMBIN TIME: CPT | Performed by: NURSE PRACTITIONER

## 2020-01-01 PROCEDURE — 86147 CARDIOLIPIN ANTIBODY EA IG: CPT | Performed by: NURSE PRACTITIONER

## 2020-01-01 PROCEDURE — 25000125 ZZHC RX 250: Performed by: NURSE ANESTHETIST, CERTIFIED REGISTERED

## 2020-01-01 PROCEDURE — 40000239 ZZH STATISTIC VAT ROUNDS

## 2020-01-01 PROCEDURE — 25000128 H RX IP 250 OP 636: Performed by: NURSE ANESTHETIST, CERTIFIED REGISTERED

## 2020-01-01 PROCEDURE — 36415 COLL VENOUS BLD VENIPUNCTURE: CPT | Performed by: STUDENT IN AN ORGANIZED HEALTH CARE EDUCATION/TRAINING PROGRAM

## 2020-01-01 PROCEDURE — 99233 SBSQ HOSP IP/OBS HIGH 50: CPT | Performed by: STUDENT IN AN ORGANIZED HEALTH CARE EDUCATION/TRAINING PROGRAM

## 2020-01-01 PROCEDURE — 43235 EGD DIAGNOSTIC BRUSH WASH: CPT | Performed by: INTERNAL MEDICINE

## 2020-01-01 PROCEDURE — 00000146 ZZHCL STATISTIC GLUCOSE BY METER IP

## 2020-01-01 PROCEDURE — 25000128 H RX IP 250 OP 636: Performed by: STUDENT IN AN ORGANIZED HEALTH CARE EDUCATION/TRAINING PROGRAM

## 2020-01-01 PROCEDURE — 36415 COLL VENOUS BLD VENIPUNCTURE: CPT | Performed by: NURSE PRACTITIONER

## 2020-01-01 PROCEDURE — 0W9G3ZZ DRAINAGE OF PERITONEAL CAVITY, PERCUTANEOUS APPROACH: ICD-10-PCS | Performed by: RADIOLOGY

## 2020-01-01 PROCEDURE — 75774 ARTERY X-RAY EACH VESSEL: CPT

## 2020-01-01 PROCEDURE — 80053 COMPREHEN METABOLIC PANEL: CPT | Performed by: INTERNAL MEDICINE

## 2020-01-01 PROCEDURE — 36248 INS CATH ABD/L-EXT ART ADDL: CPT

## 2020-01-01 PROCEDURE — 97535 SELF CARE MNGMENT TRAINING: CPT | Mod: GO

## 2020-01-01 PROCEDURE — 75726 ARTERY X-RAYS ABDOMEN: CPT

## 2020-01-01 PROCEDURE — 85027 COMPLETE CBC AUTOMATED: CPT | Performed by: NURSE PRACTITIONER

## 2020-01-01 PROCEDURE — 99153 MOD SED SAME PHYS/QHP EA: CPT | Performed by: INTERNAL MEDICINE

## 2020-01-01 PROCEDURE — 27210892 ZZH CATH CR4

## 2020-01-01 PROCEDURE — 85610 PROTHROMBIN TIME: CPT | Performed by: RADIOLOGY

## 2020-01-01 PROCEDURE — B4131ZZ FLUOROSCOPY OF SPLENIC ARTERIES USING LOW OSMOLAR CONTRAST: ICD-10-PCS | Performed by: RADIOLOGY

## 2020-01-01 PROCEDURE — 0W3P8ZZ CONTROL BLEEDING IN GASTROINTESTINAL TRACT, VIA NATURAL OR ARTIFICIAL OPENING ENDOSCOPIC: ICD-10-PCS | Performed by: INTERNAL MEDICINE

## 2020-01-01 PROCEDURE — 97530 THERAPEUTIC ACTIVITIES: CPT | Mod: GP | Performed by: PHYSICAL THERAPIST

## 2020-01-01 PROCEDURE — 85027 COMPLETE CBC AUTOMATED: CPT | Performed by: RADIOLOGY

## 2020-01-01 PROCEDURE — 99233 SBSQ HOSP IP/OBS HIGH 50: CPT | Performed by: HOSPITALIST

## 2020-01-01 PROCEDURE — 25800030 ZZH RX IP 258 OP 636: Performed by: STUDENT IN AN ORGANIZED HEALTH CARE EDUCATION/TRAINING PROGRAM

## 2020-01-01 PROCEDURE — 83615 LACTATE (LD) (LDH) ENZYME: CPT | Performed by: NURSE PRACTITIONER

## 2020-01-01 PROCEDURE — 37000009 ZZH ANESTHESIA TECHNICAL FEE, EACH ADDTL 15 MIN: Performed by: INTERNAL MEDICINE

## 2020-01-01 PROCEDURE — 86146 BETA-2 GLYCOPROTEIN ANTIBODY: CPT | Performed by: NURSE PRACTITIONER

## 2020-01-01 PROCEDURE — 40000141 ZZH STATISTIC PERIPHERAL IV START W/O US GUIDANCE

## 2020-01-01 PROCEDURE — 83010 ASSAY OF HAPTOGLOBIN QUANT: CPT | Performed by: NURSE PRACTITIONER

## 2020-01-01 PROCEDURE — 25800030 ZZH RX IP 258 OP 636: Performed by: INTERNAL MEDICINE

## 2020-01-01 PROCEDURE — B4141ZZ FLUOROSCOPY OF SUPERIOR MESENTERIC ARTERY USING LOW OSMOLAR CONTRAST: ICD-10-PCS | Performed by: RADIOLOGY

## 2020-01-01 PROCEDURE — 70450 CT HEAD/BRAIN W/O DYE: CPT

## 2020-01-01 PROCEDURE — 84100 ASSAY OF PHOSPHORUS: CPT | Performed by: INTERNAL MEDICINE

## 2020-01-01 PROCEDURE — 27210742 ZZH CATH CR1

## 2020-01-01 PROCEDURE — 99214 OFFICE O/P EST MOD 30 MIN: CPT | Performed by: INTERNAL MEDICINE

## 2020-01-01 PROCEDURE — 99213 OFFICE O/P EST LOW 20 MIN: CPT | Mod: 95 | Performed by: INTERNAL MEDICINE

## 2020-01-01 PROCEDURE — 27210582 ZZH DEVICE CLIP RESOLUTION, EACH: Performed by: INTERNAL MEDICINE

## 2020-01-01 PROCEDURE — 25000128 H RX IP 250 OP 636: Performed by: RADIOLOGY

## 2020-01-01 PROCEDURE — B41B1ZZ FLUOROSCOPY OF OTHER INTRA-ABDOMINAL ARTERIES USING LOW OSMOLAR CONTRAST: ICD-10-PCS | Performed by: RADIOLOGY

## 2020-01-01 PROCEDURE — 12001 RPR S/N/AX/GEN/TRNK 2.5CM/<: CPT

## 2020-01-01 PROCEDURE — 999N000154 HC STATISTIC RADIOLOGY XRAY, US, CT, MAR, NM

## 2020-01-01 PROCEDURE — 27210190 US PARACENTESIS

## 2020-01-01 PROCEDURE — B4121ZZ FLUOROSCOPY OF HEPATIC ARTERY USING LOW OSMOLAR CONTRAST: ICD-10-PCS | Performed by: RADIOLOGY

## 2020-01-01 PROCEDURE — 36569 INSJ PICC 5 YR+ W/O IMAGING: CPT

## 2020-01-01 PROCEDURE — 83735 ASSAY OF MAGNESIUM: CPT | Performed by: INTERNAL MEDICINE

## 2020-01-01 PROCEDURE — B4181ZZ FLUOROSCOPY OF BILATERAL RENAL ARTERIES USING LOW OSMOLAR CONTRAST: ICD-10-PCS | Performed by: RADIOLOGY

## 2020-01-01 PROCEDURE — 85597 PHOSPHOLIPID PLTLT NEUTRALIZ: CPT | Performed by: NURSE PRACTITIONER

## 2020-01-01 PROCEDURE — 97110 THERAPEUTIC EXERCISES: CPT | Mod: GP | Performed by: PHYSICAL THERAPIST

## 2020-01-01 PROCEDURE — 25000128 H RX IP 250 OP 636: Performed by: HOSPITALIST

## 2020-01-01 PROCEDURE — 45382 COLONOSCOPY W/CONTROL BLEED: CPT | Performed by: INTERNAL MEDICINE

## 2020-01-01 PROCEDURE — 97166 OT EVAL MOD COMPLEX 45 MIN: CPT | Mod: GO | Performed by: OCCUPATIONAL THERAPIST

## 2020-01-01 PROCEDURE — C1769 GUIDE WIRE: HCPCS

## 2020-01-01 PROCEDURE — 25500064 ZZH RX 255 OP 636: Performed by: RADIOLOGY

## 2020-01-01 PROCEDURE — 25000566 ZZH SEVOFLURANE, EA 15 MIN: Performed by: INTERNAL MEDICINE

## 2020-01-01 PROCEDURE — 99207 ZZC CDG-MDM COMPONENT: MEETS MODERATE - UP CODED: CPT | Performed by: STUDENT IN AN ORGANIZED HEALTH CARE EDUCATION/TRAINING PROGRAM

## 2020-01-01 PROCEDURE — 27211406 ZZ H KIT CATH IV 18 OR 20G CM, POWERGLIDE W MAX BARRIER

## 2020-01-01 PROCEDURE — 40000257 ZZH STATISTIC CONSULT NO CHARGE VASC ACCESS

## 2020-01-01 PROCEDURE — 36415 COLL VENOUS BLD VENIPUNCTURE: CPT | Performed by: PHYSICIAN ASSISTANT

## 2020-01-01 PROCEDURE — 36000056 ZZH SURGERY LEVEL 3 1ST 30 MIN: Performed by: INTERNAL MEDICINE

## 2020-01-01 PROCEDURE — 71046 X-RAY EXAM CHEST 2 VIEWS: CPT

## 2020-01-01 PROCEDURE — 34300033 ZZH RX 343: Performed by: INTERNAL MEDICINE

## 2020-01-01 PROCEDURE — 82746 ASSAY OF FOLIC ACID SERUM: CPT | Performed by: STUDENT IN AN ORGANIZED HEALTH CARE EDUCATION/TRAINING PROGRAM

## 2020-01-01 PROCEDURE — 99207 ZZC CDG-MDM COMPONENT: MEETS MODERATE - UP CODED: CPT | Performed by: INTERNAL MEDICINE

## 2020-01-01 PROCEDURE — 97110 THERAPEUTIC EXERCISES: CPT | Mod: GP

## 2020-01-01 PROCEDURE — 91110 GI TRC IMG INTRAL ESOPH-ILE: CPT | Mod: 52 | Performed by: INTERNAL MEDICINE

## 2020-01-01 PROCEDURE — 85018 HEMOGLOBIN: CPT | Performed by: PHYSICIAN ASSISTANT

## 2020-01-01 PROCEDURE — 43249 ESOPH EGD DILATION <30 MM: CPT | Performed by: INTERNAL MEDICINE

## 2020-01-01 PROCEDURE — 74018 RADEX ABDOMEN 1 VIEW: CPT

## 2020-01-01 PROCEDURE — 97535 SELF CARE MNGMENT TRAINING: CPT | Mod: GO | Performed by: OCCUPATIONAL THERAPIST

## 2020-01-01 PROCEDURE — C1760 CLOSURE DEV, VASC: HCPCS

## 2020-01-01 PROCEDURE — 78278 ACUTE GI BLOOD LOSS IMAGING: CPT

## 2020-01-01 PROCEDURE — G0463 HOSPITAL OUTPT CLINIC VISIT: HCPCS

## 2020-01-01 PROCEDURE — 86880 COOMBS TEST DIRECT: CPT | Performed by: NURSE PRACTITIONER

## 2020-01-01 PROCEDURE — 85049 AUTOMATED PLATELET COUNT: CPT | Performed by: PHYSICIAN ASSISTANT

## 2020-01-01 PROCEDURE — 99283 EMERGENCY DEPT VISIT LOW MDM: CPT | Mod: 25

## 2020-01-01 PROCEDURE — 0D748ZZ DILATION OF ESOPHAGOGASTRIC JUNCTION, VIA NATURAL OR ARTIFICIAL OPENING ENDOSCOPIC: ICD-10-PCS | Performed by: INTERNAL MEDICINE

## 2020-01-01 PROCEDURE — 83605 ASSAY OF LACTIC ACID: CPT | Performed by: INTERNAL MEDICINE

## 2020-01-01 PROCEDURE — 00000401 ZZHCL STATISTIC THROMBIN TIME NC: Performed by: NURSE PRACTITIONER

## 2020-01-01 PROCEDURE — 27210804 ZZH SHEATH CR3

## 2020-01-01 PROCEDURE — 45381 COLONOSCOPY SUBMUCOUS NJX: CPT | Performed by: INTERNAL MEDICINE

## 2020-01-01 PROCEDURE — 85730 THROMBOPLASTIN TIME PARTIAL: CPT | Performed by: NURSE PRACTITIONER

## 2020-01-01 PROCEDURE — 86923 COMPATIBILITY TEST ELECTRIC: CPT | Performed by: INTERNAL MEDICINE

## 2020-01-01 PROCEDURE — 25000128 H RX IP 250 OP 636: Performed by: PHYSICIAN ASSISTANT

## 2020-01-01 PROCEDURE — 82607 VITAMIN B-12: CPT | Performed by: STUDENT IN AN ORGANIZED HEALTH CARE EDUCATION/TRAINING PROGRAM

## 2020-01-01 PROCEDURE — 86870 RBC ANTIBODY IDENTIFICATION: CPT | Performed by: INTERNAL MEDICINE

## 2020-01-01 PROCEDURE — 99214 OFFICE O/P EST MOD 30 MIN: CPT | Mod: 95 | Performed by: INTERNAL MEDICINE

## 2020-01-01 PROCEDURE — 71000012 ZZH RECOVERY PHASE 1 LEVEL 1 FIRST HR: Performed by: INTERNAL MEDICINE

## 2020-01-01 RX ORDER — LIDOCAINE 40 MG/G
CREAM TOPICAL
Status: DISCONTINUED | OUTPATIENT
Start: 2020-01-01 | End: 2020-01-01 | Stop reason: HOSPADM

## 2020-01-01 RX ORDER — HEPARIN SODIUM 200 [USP'U]/100ML
1 INJECTION, SOLUTION INTRAVENOUS CONTINUOUS PRN
Status: DISCONTINUED | OUTPATIENT
Start: 2020-01-01 | End: 2020-01-01 | Stop reason: HOSPADM

## 2020-01-01 RX ORDER — NICOTINE POLACRILEX 4 MG
15-30 LOZENGE BUCCAL
Status: DISCONTINUED | OUTPATIENT
Start: 2020-01-01 | End: 2020-01-01

## 2020-01-01 RX ORDER — CEFAZOLIN SODIUM 2 G/100ML
2 INJECTION, SOLUTION INTRAVENOUS
Status: DISCONTINUED | OUTPATIENT
Start: 2020-01-01 | End: 2020-01-01 | Stop reason: HOSPADM

## 2020-01-01 RX ORDER — MULTIPLE VITAMINS W/ MINERALS TAB 9MG-400MCG
1 TAB ORAL DAILY
Status: DISCONTINUED | OUTPATIENT
Start: 2020-01-01 | End: 2020-01-01 | Stop reason: HOSPADM

## 2020-01-01 RX ORDER — SUCRALFATE ORAL 1 G/10ML
1 SUSPENSION ORAL
Qty: 280 ML | Refills: 0 | Status: SHIPPED | OUTPATIENT
Start: 2020-01-01 | End: 2020-01-01

## 2020-01-01 RX ORDER — DEXTROSE MONOHYDRATE 25 G/50ML
25-50 INJECTION, SOLUTION INTRAVENOUS
Status: CANCELLED | OUTPATIENT
Start: 2020-01-01

## 2020-01-01 RX ORDER — GABAPENTIN 600 MG/1
600 TABLET ORAL ONCE
Status: COMPLETED | OUTPATIENT
Start: 2020-01-01 | End: 2020-01-01

## 2020-01-01 RX ORDER — EPINEPHRINE IN SOD CHLOR,ISO 1 MG/10 ML
SYRINGE (ML) INTRAVENOUS PRN
Status: DISCONTINUED | OUTPATIENT
Start: 2020-01-01 | End: 2020-01-01 | Stop reason: HOSPADM

## 2020-01-01 RX ORDER — NICOTINE POLACRILEX 4 MG
15-30 LOZENGE BUCCAL
Status: DISCONTINUED | OUTPATIENT
Start: 2020-01-01 | End: 2020-01-01 | Stop reason: HOSPADM

## 2020-01-01 RX ORDER — NALOXONE HYDROCHLORIDE 0.4 MG/ML
.1-.4 INJECTION, SOLUTION INTRAMUSCULAR; INTRAVENOUS; SUBCUTANEOUS
Status: ACTIVE | OUTPATIENT
Start: 2020-01-01 | End: 2020-01-01

## 2020-01-01 RX ORDER — SODIUM CHLORIDE 9 MG/ML
INJECTION, SOLUTION INTRAVENOUS CONTINUOUS
Status: DISCONTINUED | OUTPATIENT
Start: 2020-01-01 | End: 2020-01-01

## 2020-01-01 RX ORDER — EPHEDRINE SULFATE 50 MG/ML
INJECTION, SOLUTION INTRAMUSCULAR; INTRAVENOUS; SUBCUTANEOUS PRN
Status: DISCONTINUED | OUTPATIENT
Start: 2020-01-01 | End: 2020-01-01

## 2020-01-01 RX ORDER — FLUMAZENIL 0.1 MG/ML
0.2 INJECTION, SOLUTION INTRAVENOUS
Status: DISCONTINUED | OUTPATIENT
Start: 2020-01-01 | End: 2020-01-01 | Stop reason: HOSPADM

## 2020-01-01 RX ORDER — DEXTROSE MONOHYDRATE 25 G/50ML
25-50 INJECTION, SOLUTION INTRAVENOUS
Status: DISCONTINUED | OUTPATIENT
Start: 2020-01-01 | End: 2020-01-01

## 2020-01-01 RX ORDER — ALBUMIN (HUMAN) 12.5 G/50ML
12.5 SOLUTION INTRAVENOUS ONCE
Status: DISCONTINUED | OUTPATIENT
Start: 2020-01-01 | End: 2020-01-01 | Stop reason: HOSPADM

## 2020-01-01 RX ORDER — HYDROMORPHONE HYDROCHLORIDE 1 MG/ML
0.3 INJECTION, SOLUTION INTRAMUSCULAR; INTRAVENOUS; SUBCUTANEOUS ONCE
Status: COMPLETED | OUTPATIENT
Start: 2020-01-01 | End: 2020-01-01

## 2020-01-01 RX ORDER — MEPERIDINE HYDROCHLORIDE 25 MG/ML
12.5 INJECTION INTRAMUSCULAR; INTRAVENOUS; SUBCUTANEOUS
Status: DISCONTINUED | OUTPATIENT
Start: 2020-01-01 | End: 2020-01-01

## 2020-01-01 RX ORDER — ONDANSETRON 2 MG/ML
4 INJECTION INTRAMUSCULAR; INTRAVENOUS EVERY 30 MIN PRN
Status: DISCONTINUED | OUTPATIENT
Start: 2020-01-01 | End: 2020-01-01 | Stop reason: HOSPADM

## 2020-01-01 RX ORDER — PANTOPRAZOLE SODIUM 40 MG/1
40 TABLET, DELAYED RELEASE ORAL
Qty: 60 TABLET | Refills: 0 | Status: ON HOLD | OUTPATIENT
Start: 2020-01-01 | End: 2020-01-01

## 2020-01-01 RX ORDER — SODIUM CHLORIDE, SODIUM LACTATE, POTASSIUM CHLORIDE, CALCIUM CHLORIDE 600; 310; 30; 20 MG/100ML; MG/100ML; MG/100ML; MG/100ML
INJECTION, SOLUTION INTRAVENOUS CONTINUOUS
Status: DISCONTINUED | OUTPATIENT
Start: 2020-01-01 | End: 2020-01-01

## 2020-01-01 RX ORDER — LIDOCAINE HYDROCHLORIDE 20 MG/ML
INJECTION, SOLUTION INFILTRATION; PERINEURAL PRN
Status: DISCONTINUED | OUTPATIENT
Start: 2020-01-01 | End: 2020-01-01

## 2020-01-01 RX ORDER — ALBUMIN (HUMAN) 12.5 G/50ML
25 SOLUTION INTRAVENOUS EVERY 8 HOURS
Status: COMPLETED | OUTPATIENT
Start: 2020-01-01 | End: 2020-01-01

## 2020-01-01 RX ORDER — DEXTROSE MONOHYDRATE 25 G/50ML
25-50 INJECTION, SOLUTION INTRAVENOUS
Status: DISCONTINUED | OUTPATIENT
Start: 2020-01-01 | End: 2020-01-01 | Stop reason: HOSPADM

## 2020-01-01 RX ORDER — OCTREOTIDE ACETATE 100 UG/ML
100 INJECTION, SOLUTION INTRAVENOUS; SUBCUTANEOUS ONCE
Status: COMPLETED | OUTPATIENT
Start: 2020-01-01 | End: 2020-01-01

## 2020-01-01 RX ORDER — NALOXONE HYDROCHLORIDE 0.4 MG/ML
.1-.4 INJECTION, SOLUTION INTRAMUSCULAR; INTRAVENOUS; SUBCUTANEOUS
Status: DISCONTINUED | OUTPATIENT
Start: 2020-01-01 | End: 2020-01-01

## 2020-01-01 RX ORDER — PANTOPRAZOLE SODIUM 40 MG/1
40 TABLET, DELAYED RELEASE ORAL
Status: DISCONTINUED | OUTPATIENT
Start: 2020-01-01 | End: 2020-01-01 | Stop reason: HOSPADM

## 2020-01-01 RX ORDER — PROPOFOL 10 MG/ML
INJECTION, EMULSION INTRAVENOUS PRN
Status: DISCONTINUED | OUTPATIENT
Start: 2020-01-01 | End: 2020-01-01

## 2020-01-01 RX ORDER — FENTANYL CITRATE 50 UG/ML
25-50 INJECTION, SOLUTION INTRAMUSCULAR; INTRAVENOUS
Status: CANCELLED | OUTPATIENT
Start: 2020-01-01

## 2020-01-01 RX ORDER — HEPARIN SODIUM 200 [USP'U]/100ML
INJECTION, SOLUTION INTRAVENOUS
Status: DISCONTINUED
Start: 2020-01-01 | End: 2020-01-01 | Stop reason: HOSPADM

## 2020-01-01 RX ORDER — ONDANSETRON 2 MG/ML
INJECTION INTRAMUSCULAR; INTRAVENOUS PRN
Status: DISCONTINUED | OUTPATIENT
Start: 2020-01-01 | End: 2020-01-01

## 2020-01-01 RX ORDER — FENTANYL CITRATE 50 UG/ML
INJECTION, SOLUTION INTRAMUSCULAR; INTRAVENOUS PRN
Status: DISCONTINUED | OUTPATIENT
Start: 2020-01-01 | End: 2020-01-01 | Stop reason: HOSPADM

## 2020-01-01 RX ORDER — FUROSEMIDE 10 MG/ML
40 INJECTION INTRAMUSCULAR; INTRAVENOUS ONCE
Status: COMPLETED | OUTPATIENT
Start: 2020-01-01 | End: 2020-01-01

## 2020-01-01 RX ORDER — LIDOCAINE 40 MG/G
CREAM TOPICAL
Status: CANCELLED | OUTPATIENT
Start: 2020-01-01

## 2020-01-01 RX ORDER — TORSEMIDE 20 MG/1
20 TABLET ORAL EVERY EVENING
Qty: 30 TABLET | Refills: 0 | Status: SHIPPED | OUTPATIENT
Start: 2020-01-01 | End: 2020-01-01 | Stop reason: DRUGHIGH

## 2020-01-01 RX ORDER — LIDOCAINE HYDROCHLORIDE 10 MG/ML
INJECTION, SOLUTION INFILTRATION; PERINEURAL
Status: DISCONTINUED
Start: 2020-01-01 | End: 2020-01-01 | Stop reason: HOSPADM

## 2020-01-01 RX ORDER — NICOTINE POLACRILEX 4 MG
15-30 LOZENGE BUCCAL
Status: CANCELLED | OUTPATIENT
Start: 2020-01-01

## 2020-01-01 RX ORDER — FUROSEMIDE 10 MG/ML
40 INJECTION INTRAMUSCULAR; INTRAVENOUS DAILY
Status: DISCONTINUED | OUTPATIENT
Start: 2020-01-01 | End: 2020-01-01 | Stop reason: HOSPADM

## 2020-01-01 RX ORDER — FENTANYL CITRATE 50 UG/ML
25-50 INJECTION, SOLUTION INTRAMUSCULAR; INTRAVENOUS EVERY 5 MIN PRN
Status: DISCONTINUED | OUTPATIENT
Start: 2020-01-01 | End: 2020-01-01 | Stop reason: HOSPADM

## 2020-01-01 RX ORDER — METOCLOPRAMIDE HYDROCHLORIDE 5 MG/ML
5 INJECTION INTRAMUSCULAR; INTRAVENOUS EVERY 6 HOURS
Status: DISPENSED | OUTPATIENT
Start: 2020-01-01 | End: 2020-01-01

## 2020-01-01 RX ORDER — FENTANYL CITRATE 50 UG/ML
25-50 INJECTION, SOLUTION INTRAMUSCULAR; INTRAVENOUS
Status: DISCONTINUED | OUTPATIENT
Start: 2020-01-01 | End: 2020-01-01 | Stop reason: HOSPADM

## 2020-01-01 RX ORDER — POLYETHYLENE GLYCOL 3350 17 G/17G
238 POWDER, FOR SOLUTION ORAL ONCE
Status: COMPLETED | OUTPATIENT
Start: 2020-01-01 | End: 2020-01-01

## 2020-01-01 RX ORDER — ONDANSETRON 4 MG/1
4 TABLET, ORALLY DISINTEGRATING ORAL EVERY 30 MIN PRN
Status: DISCONTINUED | OUTPATIENT
Start: 2020-01-01 | End: 2020-01-01

## 2020-01-01 RX ORDER — HYDROMORPHONE HYDROCHLORIDE 1 MG/ML
.3-.5 INJECTION, SOLUTION INTRAMUSCULAR; INTRAVENOUS; SUBCUTANEOUS EVERY 5 MIN PRN
Status: DISCONTINUED | OUTPATIENT
Start: 2020-01-01 | End: 2020-01-01 | Stop reason: HOSPADM

## 2020-01-01 RX ORDER — SODIUM CHLORIDE, SODIUM LACTATE, POTASSIUM CHLORIDE, CALCIUM CHLORIDE 600; 310; 30; 20 MG/100ML; MG/100ML; MG/100ML; MG/100ML
INJECTION, SOLUTION INTRAVENOUS CONTINUOUS PRN
Status: DISCONTINUED | OUTPATIENT
Start: 2020-01-01 | End: 2020-01-01

## 2020-01-01 RX ORDER — SPIRONOLACTONE 100 MG/1
100 TABLET, FILM COATED ORAL DAILY
Qty: 30 TABLET | Refills: 0 | Status: SHIPPED | OUTPATIENT
Start: 2020-01-01 | End: 2020-01-01

## 2020-01-01 RX ORDER — ASPIRIN 81 MG/1
81 TABLET ORAL DAILY
COMMUNITY

## 2020-01-01 RX ORDER — ONDANSETRON 2 MG/ML
4 INJECTION INTRAMUSCULAR; INTRAVENOUS EVERY 30 MIN PRN
Status: DISCONTINUED | OUTPATIENT
Start: 2020-01-01 | End: 2020-01-01

## 2020-01-01 RX ORDER — FLUMAZENIL 0.1 MG/ML
0.2 INJECTION, SOLUTION INTRAVENOUS
Status: ACTIVE | OUTPATIENT
Start: 2020-01-01 | End: 2020-01-01

## 2020-01-01 RX ORDER — GABAPENTIN 600 MG/1
TABLET ORAL
Qty: 180 TABLET | Refills: 3 | Status: ON HOLD | OUTPATIENT
Start: 2020-01-01 | End: 2021-01-01

## 2020-01-01 RX ORDER — SUCRALFATE ORAL 1 G/10ML
1 SUSPENSION ORAL
Qty: 280 ML | Refills: 0 | Status: ON HOLD | OUTPATIENT
Start: 2020-01-01 | End: 2020-01-01

## 2020-01-01 RX ORDER — HYDROMORPHONE HYDROCHLORIDE 1 MG/ML
.3-.5 INJECTION, SOLUTION INTRAMUSCULAR; INTRAVENOUS; SUBCUTANEOUS EVERY 10 MIN PRN
Status: DISCONTINUED | OUTPATIENT
Start: 2020-01-01 | End: 2020-01-01

## 2020-01-01 RX ORDER — LIDOCAINE HYDROCHLORIDE 10 MG/ML
10 INJECTION, SOLUTION EPIDURAL; INFILTRATION; INTRACAUDAL; PERINEURAL ONCE
Status: COMPLETED | OUTPATIENT
Start: 2020-01-01 | End: 2020-01-01

## 2020-01-01 RX ORDER — ALBUMIN (HUMAN) 12.5 G/50ML
12.5 SOLUTION INTRAVENOUS ONCE
Status: CANCELLED | OUTPATIENT
Start: 2020-01-01 | End: 2020-01-01

## 2020-01-01 RX ORDER — SUCRALFATE ORAL 1 G/10ML
1 SUSPENSION ORAL
Status: DISCONTINUED | OUTPATIENT
Start: 2020-01-01 | End: 2020-01-01 | Stop reason: HOSPADM

## 2020-01-01 RX ORDER — SUCRALFATE ORAL 1 G/10ML
SUSPENSION ORAL
COMMUNITY
Start: 2020-01-01 | End: 2020-01-01

## 2020-01-01 RX ORDER — FUROSEMIDE 10 MG/ML
40 INJECTION INTRAMUSCULAR; INTRAVENOUS EVERY 12 HOURS
Status: COMPLETED | OUTPATIENT
Start: 2020-01-01 | End: 2020-01-01

## 2020-01-01 RX ORDER — LIDOCAINE 40 MG/G
CREAM TOPICAL
Status: DISCONTINUED | OUTPATIENT
Start: 2020-01-01 | End: 2020-01-01

## 2020-01-01 RX ORDER — FENTANYL CITRATE 50 UG/ML
INJECTION, SOLUTION INTRAMUSCULAR; INTRAVENOUS PRN
Status: DISCONTINUED | OUTPATIENT
Start: 2020-01-01 | End: 2020-01-01

## 2020-01-01 RX ORDER — MAGNESIUM CARB/ALUMINUM HYDROX 105-160MG
296 TABLET,CHEWABLE ORAL ONCE
Status: COMPLETED | OUTPATIENT
Start: 2020-01-01 | End: 2020-01-01

## 2020-01-01 RX ORDER — PANTOPRAZOLE SODIUM 40 MG/1
40 TABLET, DELAYED RELEASE ORAL
Qty: 60 TABLET | Refills: 0 | Status: SHIPPED | OUTPATIENT
Start: 2020-01-01 | End: 2020-01-01

## 2020-01-01 RX ORDER — NALOXONE HYDROCHLORIDE 0.4 MG/ML
.1-.4 INJECTION, SOLUTION INTRAMUSCULAR; INTRAVENOUS; SUBCUTANEOUS
Status: DISCONTINUED | OUTPATIENT
Start: 2020-01-01 | End: 2020-01-01 | Stop reason: HOSPADM

## 2020-01-01 RX ORDER — IOPAMIDOL 612 MG/ML
100 INJECTION, SOLUTION INTRAVASCULAR ONCE
Status: COMPLETED | OUTPATIENT
Start: 2020-01-01 | End: 2020-01-01

## 2020-01-01 RX ORDER — PANTOPRAZOLE SODIUM 40 MG/1
TABLET, DELAYED RELEASE ORAL
COMMUNITY
Start: 2020-01-01 | End: 2020-01-01

## 2020-01-01 RX ORDER — FLUMAZENIL 0.1 MG/ML
0.2 INJECTION, SOLUTION INTRAVENOUS
Status: DISPENSED | OUTPATIENT
Start: 2020-01-01 | End: 2020-01-01

## 2020-01-01 RX ORDER — FENTANYL CITRATE 50 UG/ML
INJECTION, SOLUTION INTRAMUSCULAR; INTRAVENOUS
Status: DISCONTINUED
Start: 2020-01-01 | End: 2020-01-01 | Stop reason: HOSPADM

## 2020-01-01 RX ORDER — FLUMAZENIL 0.1 MG/ML
0.2 INJECTION, SOLUTION INTRAVENOUS
Status: CANCELLED | OUTPATIENT
Start: 2020-01-01 | End: 2020-01-01

## 2020-01-01 RX ORDER — SPIRONOLACTONE 25 MG/1
100 TABLET ORAL
Status: DISCONTINUED | OUTPATIENT
Start: 2020-01-01 | End: 2020-01-01

## 2020-01-01 RX ORDER — HEPARIN SODIUM 200 [USP'U]/100ML
1 INJECTION, SOLUTION INTRAVENOUS CONTINUOUS PRN
Status: DISCONTINUED | OUTPATIENT
Start: 2020-01-01 | End: 2020-01-01

## 2020-01-01 RX ORDER — NITROGLYCERIN 0.4 MG/1
0.4 TABLET SUBLINGUAL EVERY 5 MIN PRN
Status: DISCONTINUED | OUTPATIENT
Start: 2020-01-01 | End: 2020-01-01

## 2020-01-01 RX ORDER — SODIUM CHLORIDE, SODIUM LACTATE, POTASSIUM CHLORIDE, CALCIUM CHLORIDE 600; 310; 30; 20 MG/100ML; MG/100ML; MG/100ML; MG/100ML
INJECTION, SOLUTION INTRAVENOUS CONTINUOUS
Status: DISCONTINUED | OUTPATIENT
Start: 2020-01-01 | End: 2020-01-01 | Stop reason: HOSPADM

## 2020-01-01 RX ORDER — SPIRONOLACTONE 25 MG/1
100 TABLET ORAL DAILY
Status: DISCONTINUED | OUTPATIENT
Start: 2020-01-01 | End: 2020-01-01 | Stop reason: HOSPADM

## 2020-01-01 RX ORDER — ONDANSETRON 4 MG/1
4 TABLET, ORALLY DISINTEGRATING ORAL EVERY 30 MIN PRN
Status: DISCONTINUED | OUTPATIENT
Start: 2020-01-01 | End: 2020-01-01 | Stop reason: HOSPADM

## 2020-01-01 RX ORDER — ALBUMIN, HUMAN INJ 5% 5 %
12.5 SOLUTION INTRAVENOUS ONCE
Status: COMPLETED | OUTPATIENT
Start: 2020-01-01 | End: 2020-01-01

## 2020-01-01 RX ORDER — NITROGLYCERIN 0.4 MG/1
0.4 TABLET SUBLINGUAL EVERY 5 MIN PRN
Status: DISCONTINUED | OUTPATIENT
Start: 2020-01-01 | End: 2020-01-01 | Stop reason: HOSPADM

## 2020-01-01 RX ORDER — PROPOFOL 10 MG/ML
INJECTION, EMULSION INTRAVENOUS CONTINUOUS PRN
Status: DISCONTINUED | OUTPATIENT
Start: 2020-01-01 | End: 2020-01-01

## 2020-01-01 RX ORDER — NALOXONE HYDROCHLORIDE 0.4 MG/ML
.1-.4 INJECTION, SOLUTION INTRAMUSCULAR; INTRAVENOUS; SUBCUTANEOUS
Status: DISPENSED | OUTPATIENT
Start: 2020-01-01 | End: 2020-01-01

## 2020-01-01 RX ORDER — BISACODYL 5 MG
10 TABLET, DELAYED RELEASE (ENTERIC COATED) ORAL ONCE
Status: DISCONTINUED | OUTPATIENT
Start: 2020-01-01 | End: 2020-01-01 | Stop reason: HOSPADM

## 2020-01-01 RX ORDER — POLYETHYLENE GLYCOL 3350 17 G/17G
238 POWDER, FOR SOLUTION ORAL ONCE
Status: DISCONTINUED | OUTPATIENT
Start: 2020-01-01 | End: 2020-01-01 | Stop reason: HOSPADM

## 2020-01-01 RX ORDER — NALOXONE HYDROCHLORIDE 0.4 MG/ML
.1-.4 INJECTION, SOLUTION INTRAMUSCULAR; INTRAVENOUS; SUBCUTANEOUS
Status: CANCELLED | OUTPATIENT
Start: 2020-01-01 | End: 2020-01-01

## 2020-01-01 RX ADMIN — POLYETHYLENE GLYCOL 3350 238 G: 17 POWDER, FOR SOLUTION ORAL at 17:14

## 2020-01-01 RX ADMIN — SODIUM CHLORIDE: 9 INJECTION, SOLUTION INTRAVENOUS at 13:07

## 2020-01-01 RX ADMIN — CEFTRIAXONE 2 G: 2 INJECTION, POWDER, FOR SOLUTION INTRAMUSCULAR; INTRAVENOUS at 15:37

## 2020-01-01 RX ADMIN — GABAPENTIN 600 MG: 600 TABLET, FILM COATED ORAL at 08:52

## 2020-01-01 RX ADMIN — ZOLPIDEM TARTRATE 2.5 MG: 5 TABLET, FILM COATED ORAL at 00:57

## 2020-01-01 RX ADMIN — GABAPENTIN 600 MG: 600 TABLET, FILM COATED ORAL at 22:01

## 2020-01-01 RX ADMIN — GABAPENTIN 600 MG: 600 TABLET, FILM COATED ORAL at 21:05

## 2020-01-01 RX ADMIN — GABAPENTIN 600 MG: 600 TABLET, FILM COATED ORAL at 08:55

## 2020-01-01 RX ADMIN — GABAPENTIN 600 MG: 600 TABLET, FILM COATED ORAL at 09:15

## 2020-01-01 RX ADMIN — PROPOFOL 25 MG: 10 INJECTION, EMULSION INTRAVENOUS at 15:50

## 2020-01-01 RX ADMIN — SUCRALFATE 1 G: 1 SUSPENSION ORAL at 08:58

## 2020-01-01 RX ADMIN — GABAPENTIN 600 MG: 600 TABLET, FILM COATED ORAL at 20:09

## 2020-01-01 RX ADMIN — ONDANSETRON 4 MG: 2 INJECTION INTRAMUSCULAR; INTRAVENOUS at 15:53

## 2020-01-01 RX ADMIN — PANTOPRAZOLE SODIUM 40 MG: 40 TABLET, DELAYED RELEASE ORAL at 06:56

## 2020-01-01 RX ADMIN — GABAPENTIN 600 MG: 600 TABLET, FILM COATED ORAL at 08:18

## 2020-01-01 RX ADMIN — PANTOPRAZOLE SODIUM 40 MG: 40 TABLET, DELAYED RELEASE ORAL at 06:35

## 2020-01-01 RX ADMIN — LIDOCAINE HYDROCHLORIDE 10 ML: 10 INJECTION, SOLUTION EPIDURAL; INFILTRATION; INTRACAUDAL; PERINEURAL at 14:24

## 2020-01-01 RX ADMIN — MULTIPLE VITAMINS W/ MINERALS TAB 1 TABLET: TAB at 08:08

## 2020-01-01 RX ADMIN — SUCRALFATE 1 G: 1 SUSPENSION ORAL at 16:57

## 2020-01-01 RX ADMIN — MULTIPLE VITAMINS W/ MINERALS TAB 1 TABLET: TAB at 08:56

## 2020-01-01 RX ADMIN — SUCRALFATE 1 G: 1 SUSPENSION ORAL at 13:13

## 2020-01-01 RX ADMIN — SODIUM CHLORIDE, POTASSIUM CHLORIDE, SODIUM LACTATE AND CALCIUM CHLORIDE: 600; 310; 30; 20 INJECTION, SOLUTION INTRAVENOUS at 15:36

## 2020-01-01 RX ADMIN — ACETYLCYSTEINE 1200 MG: 200 SOLUTION ORAL; RESPIRATORY (INHALATION) at 09:15

## 2020-01-01 RX ADMIN — SUCRALFATE 1 G: 1 SUSPENSION ORAL at 21:30

## 2020-01-01 RX ADMIN — MULTIPLE VITAMINS W/ MINERALS TAB 1 TABLET: TAB at 08:34

## 2020-01-01 RX ADMIN — PANTOPRAZOLE SODIUM 40 MG: 40 TABLET, DELAYED RELEASE ORAL at 17:41

## 2020-01-01 RX ADMIN — SUCRALFATE 1 G: 1 SUSPENSION ORAL at 09:28

## 2020-01-01 RX ADMIN — LIDOCAINE HYDROCHLORIDE 60 MG: 20 INJECTION, SOLUTION INFILTRATION; PERINEURAL at 12:00

## 2020-01-01 RX ADMIN — FUROSEMIDE 40 MG: 10 INJECTION, SOLUTION INTRAVENOUS at 08:14

## 2020-01-01 RX ADMIN — ONDANSETRON 4 MG: 2 INJECTION INTRAMUSCULAR; INTRAVENOUS at 12:11

## 2020-01-01 RX ADMIN — METOCLOPRAMIDE 5 MG: 5 INJECTION, SOLUTION INTRAMUSCULAR; INTRAVENOUS at 13:40

## 2020-01-01 RX ADMIN — HYDROMORPHONE HYDROCHLORIDE 1 MG: 2 TABLET ORAL at 16:21

## 2020-01-01 RX ADMIN — SUCRALFATE 1 G: 1 SUSPENSION ORAL at 16:22

## 2020-01-01 RX ADMIN — PROPOFOL 10 MG: 10 INJECTION, EMULSION INTRAVENOUS at 12:16

## 2020-01-01 RX ADMIN — MULTIPLE VITAMINS W/ MINERALS TAB 1 TABLET: TAB at 08:43

## 2020-01-01 RX ADMIN — CEFTRIAXONE 2 G: 2 INJECTION, POWDER, FOR SOLUTION INTRAMUSCULAR; INTRAVENOUS at 15:29

## 2020-01-01 RX ADMIN — PANTOPRAZOLE SODIUM 40 MG: 40 TABLET, DELAYED RELEASE ORAL at 15:55

## 2020-01-01 RX ADMIN — GABAPENTIN 600 MG: 600 TABLET, FILM COATED ORAL at 20:22

## 2020-01-01 RX ADMIN — SUCRALFATE 1 G: 1 SUSPENSION ORAL at 08:13

## 2020-01-01 RX ADMIN — MULTIPLE VITAMINS W/ MINERALS TAB 1 TABLET: TAB at 09:13

## 2020-01-01 RX ADMIN — MULTIPLE VITAMINS W/ MINERALS TAB 1 TABLET: TAB at 09:28

## 2020-01-01 RX ADMIN — MULTIPLE VITAMINS W/ MINERALS TAB 1 TABLET: TAB at 08:54

## 2020-01-01 RX ADMIN — FUROSEMIDE 40 MG: 10 INJECTION, SOLUTION INTRAVENOUS at 08:56

## 2020-01-01 RX ADMIN — GABAPENTIN 600 MG: 600 TABLET, FILM COATED ORAL at 21:30

## 2020-01-01 RX ADMIN — ACETAMINOPHEN 650 MG: 325 TABLET, FILM COATED ORAL at 13:24

## 2020-01-01 RX ADMIN — GABAPENTIN 600 MG: 600 TABLET, FILM COATED ORAL at 08:16

## 2020-01-01 RX ADMIN — MAGNESIUM CITRATE 296 ML: 1.75 LIQUID ORAL at 06:56

## 2020-01-01 RX ADMIN — SUCRALFATE 1 G: 1 SUSPENSION ORAL at 18:07

## 2020-01-01 RX ADMIN — MULTIPLE VITAMINS W/ MINERALS TAB 1 TABLET: TAB at 08:53

## 2020-01-01 RX ADMIN — PANTOPRAZOLE SODIUM 40 MG: 40 TABLET, DELAYED RELEASE ORAL at 16:55

## 2020-01-01 RX ADMIN — FENTANYL CITRATE 25 MCG: 50 INJECTION, SOLUTION INTRAMUSCULAR; INTRAVENOUS at 11:44

## 2020-01-01 RX ADMIN — Medication 5 MG: at 14:29

## 2020-01-01 RX ADMIN — PANTOPRAZOLE SODIUM 40 MG: 40 TABLET, DELAYED RELEASE ORAL at 05:45

## 2020-01-01 RX ADMIN — SUCRALFATE 1 G: 1 SUSPENSION ORAL at 21:06

## 2020-01-01 RX ADMIN — PANTOPRAZOLE SODIUM 40 MG: 40 TABLET, DELAYED RELEASE ORAL at 08:28

## 2020-01-01 RX ADMIN — IRON SUCROSE 300 MG: 20 INJECTION, SOLUTION INTRAVENOUS at 14:06

## 2020-01-01 RX ADMIN — PANTOPRAZOLE SODIUM 40 MG: 40 TABLET, DELAYED RELEASE ORAL at 16:40

## 2020-01-01 RX ADMIN — ACETYLCYSTEINE 1200 MG: 200 SOLUTION ORAL; RESPIRATORY (INHALATION) at 21:28

## 2020-01-01 RX ADMIN — FUROSEMIDE 40 MG: 10 INJECTION, SOLUTION INTRAVENOUS at 08:53

## 2020-01-01 RX ADMIN — GABAPENTIN 600 MG: 600 TABLET, FILM COATED ORAL at 20:41

## 2020-01-01 RX ADMIN — SPIRONOLACTONE 100 MG: 25 TABLET ORAL at 08:13

## 2020-01-01 RX ADMIN — PROPOFOL 100 MG: 10 INJECTION, EMULSION INTRAVENOUS at 13:54

## 2020-01-01 RX ADMIN — SUCRALFATE 1 G: 1 SUSPENSION ORAL at 21:29

## 2020-01-01 RX ADMIN — HEPARIN SODIUM 1 BAG: 200 INJECTION, SOLUTION INTRAVENOUS at 12:04

## 2020-01-01 RX ADMIN — SUCRALFATE 1 G: 1 SUSPENSION ORAL at 08:57

## 2020-01-01 RX ADMIN — SUCRALFATE 1 G: 1 SUSPENSION ORAL at 20:59

## 2020-01-01 RX ADMIN — SUCRALFATE 1 G: 1 SUSPENSION ORAL at 17:01

## 2020-01-01 RX ADMIN — PANTOPRAZOLE SODIUM 40 MG: 40 TABLET, DELAYED RELEASE ORAL at 05:13

## 2020-01-01 RX ADMIN — OCTREOTIDE ACETATE 50 MCG/HR: 200 INJECTION, SOLUTION INTRAVENOUS; SUBCUTANEOUS at 23:38

## 2020-01-01 RX ADMIN — SUCRALFATE 1 G: 1 SUSPENSION ORAL at 12:38

## 2020-01-01 RX ADMIN — OCTREOTIDE ACETATE 100 MCG: 100 INJECTION, SOLUTION INTRAVENOUS; SUBCUTANEOUS at 21:42

## 2020-01-01 RX ADMIN — SUCRALFATE 1 G: 1 SUSPENSION ORAL at 08:52

## 2020-01-01 RX ADMIN — SUCRALFATE 1 G: 1 SUSPENSION ORAL at 08:08

## 2020-01-01 RX ADMIN — SUCRALFATE 1 G: 1 SUSPENSION ORAL at 13:19

## 2020-01-01 RX ADMIN — PROPOFOL 50 MG: 10 INJECTION, EMULSION INTRAVENOUS at 15:42

## 2020-01-01 RX ADMIN — SUCRALFATE 1 G: 1 SUSPENSION ORAL at 22:01

## 2020-01-01 RX ADMIN — PANTOPRAZOLE SODIUM 40 MG: 40 TABLET, DELAYED RELEASE ORAL at 17:01

## 2020-01-01 RX ADMIN — SUCRALFATE 1 G: 1 SUSPENSION ORAL at 12:51

## 2020-01-01 RX ADMIN — PANTOPRAZOLE SODIUM 40 MG: 40 TABLET, DELAYED RELEASE ORAL at 15:43

## 2020-01-01 RX ADMIN — Medication 5 MG: at 14:00

## 2020-01-01 RX ADMIN — SPIRONOLACTONE 100 MG: 25 TABLET ORAL at 08:52

## 2020-01-01 RX ADMIN — FUROSEMIDE 40 MG: 10 INJECTION, SOLUTION INTRAVENOUS at 12:38

## 2020-01-01 RX ADMIN — MULTIPLE VITAMINS W/ MINERALS TAB 1 TABLET: TAB at 08:14

## 2020-01-01 RX ADMIN — ALBUMIN HUMAN 25 G: 0.25 SOLUTION INTRAVENOUS at 14:41

## 2020-01-01 RX ADMIN — GABAPENTIN 600 MG: 600 TABLET, FILM COATED ORAL at 09:13

## 2020-01-01 RX ADMIN — PANTOPRAZOLE SODIUM 40 MG: 40 TABLET, DELAYED RELEASE ORAL at 17:04

## 2020-01-01 RX ADMIN — HYDROMORPHONE HYDROCHLORIDE 0.3 MG: 1 INJECTION, SOLUTION INTRAMUSCULAR; INTRAVENOUS; SUBCUTANEOUS at 13:41

## 2020-01-01 RX ADMIN — GABAPENTIN 600 MG: 600 TABLET, FILM COATED ORAL at 21:07

## 2020-01-01 RX ADMIN — PANTOPRAZOLE SODIUM 40 MG: 40 TABLET, DELAYED RELEASE ORAL at 18:27

## 2020-01-01 RX ADMIN — ONDANSETRON 4 MG: 2 INJECTION INTRAMUSCULAR; INTRAVENOUS at 14:17

## 2020-01-01 RX ADMIN — ZOLPIDEM TARTRATE 2.5 MG: 5 TABLET, FILM COATED ORAL at 01:30

## 2020-01-01 RX ADMIN — GABAPENTIN 600 MG: 600 TABLET, FILM COATED ORAL at 08:09

## 2020-01-01 RX ADMIN — GABAPENTIN 600 MG: 600 TABLET, FILM COATED ORAL at 21:02

## 2020-01-01 RX ADMIN — GABAPENTIN 600 MG: 600 TABLET, FILM COATED ORAL at 21:29

## 2020-01-01 RX ADMIN — METOCLOPRAMIDE 5 MG: 5 INJECTION, SOLUTION INTRAMUSCULAR; INTRAVENOUS at 20:05

## 2020-01-01 RX ADMIN — MULTIPLE VITAMINS W/ MINERALS TAB 1 TABLET: TAB at 08:52

## 2020-01-01 RX ADMIN — MULTIPLE VITAMINS W/ MINERALS TAB 1 TABLET: TAB at 08:41

## 2020-01-01 RX ADMIN — GABAPENTIN 600 MG: 600 TABLET, FILM COATED ORAL at 08:20

## 2020-01-01 RX ADMIN — CEFTRIAXONE 2 G: 2 INJECTION, POWDER, FOR SOLUTION INTRAMUSCULAR; INTRAVENOUS at 15:44

## 2020-01-01 RX ADMIN — GABAPENTIN 600 MG: 600 TABLET, FILM COATED ORAL at 09:12

## 2020-01-01 RX ADMIN — FENTANYL CITRATE 50 MCG: 50 INJECTION, SOLUTION INTRAMUSCULAR; INTRAVENOUS at 13:54

## 2020-01-01 RX ADMIN — PROPOFOL 10 MG: 10 INJECTION, EMULSION INTRAVENOUS at 12:13

## 2020-01-01 RX ADMIN — IOPAMIDOL 130 ML: 612 INJECTION, SOLUTION INTRAVENOUS at 12:40

## 2020-01-01 RX ADMIN — SUCRALFATE 1 G: 1 SUSPENSION ORAL at 11:20

## 2020-01-01 RX ADMIN — MULTIPLE VITAMINS W/ MINERALS TAB 1 TABLET: TAB at 08:09

## 2020-01-01 RX ADMIN — PROPOFOL 10 MG: 10 INJECTION, EMULSION INTRAVENOUS at 12:10

## 2020-01-01 RX ADMIN — GABAPENTIN 600 MG: 600 TABLET, FILM COATED ORAL at 08:27

## 2020-01-01 RX ADMIN — PROPOFOL 75 MCG/KG/MIN: 10 INJECTION, EMULSION INTRAVENOUS at 12:00

## 2020-01-01 RX ADMIN — PANTOPRAZOLE SODIUM 40 MG: 40 TABLET, DELAYED RELEASE ORAL at 09:15

## 2020-01-01 RX ADMIN — LIDOCAINE HYDROCHLORIDE 10 ML: 10 INJECTION, SOLUTION EPIDURAL; INFILTRATION; INTRACAUDAL; PERINEURAL at 14:44

## 2020-01-01 RX ADMIN — FUROSEMIDE 40 MG: 10 INJECTION, SOLUTION INTRAVENOUS at 05:37

## 2020-01-01 RX ADMIN — SUCRALFATE 1 G: 1 SUSPENSION ORAL at 17:44

## 2020-01-01 RX ADMIN — PANTOPRAZOLE SODIUM 40 MG: 40 TABLET, DELAYED RELEASE ORAL at 15:46

## 2020-01-01 RX ADMIN — SUCRALFATE 1 G: 1 SUSPENSION ORAL at 08:16

## 2020-01-01 RX ADMIN — GABAPENTIN 600 MG: 600 TABLET, FILM COATED ORAL at 09:32

## 2020-01-01 RX ADMIN — ACETYLCYSTEINE 1200 MG: 200 SOLUTION ORAL; RESPIRATORY (INHALATION) at 20:22

## 2020-01-01 RX ADMIN — MULTIPLE VITAMINS W/ MINERALS TAB 1 TABLET: TAB at 08:28

## 2020-01-01 RX ADMIN — GABAPENTIN 600 MG: 600 TABLET, FILM COATED ORAL at 20:31

## 2020-01-01 RX ADMIN — MULTIPLE VITAMINS W/ MINERALS TAB 1 TABLET: TAB at 09:32

## 2020-01-01 RX ADMIN — FUROSEMIDE 40 MG: 10 INJECTION, SOLUTION INTRAVENOUS at 08:08

## 2020-01-01 RX ADMIN — SPIRONOLACTONE 100 MG: 25 TABLET ORAL at 08:20

## 2020-01-01 RX ADMIN — LIDOCAINE HYDROCHLORIDE 100 MG: 20 INJECTION, SOLUTION INFILTRATION; PERINEURAL at 13:54

## 2020-01-01 RX ADMIN — GABAPENTIN 600 MG: 600 TABLET, FILM COATED ORAL at 20:58

## 2020-01-01 RX ADMIN — GABAPENTIN 600 MG: 600 TABLET, FILM COATED ORAL at 20:27

## 2020-01-01 RX ADMIN — GABAPENTIN 600 MG: 600 TABLET, FILM COATED ORAL at 08:34

## 2020-01-01 RX ADMIN — GABAPENTIN 600 MG: 600 TABLET, FILM COATED ORAL at 08:41

## 2020-01-01 RX ADMIN — PANTOPRAZOLE SODIUM 40 MG: 40 TABLET, DELAYED RELEASE ORAL at 09:13

## 2020-01-01 RX ADMIN — GABAPENTIN 600 MG: 600 TABLET, FILM COATED ORAL at 08:56

## 2020-01-01 RX ADMIN — Medication 1 MG: at 00:17

## 2020-01-01 RX ADMIN — SUCRALFATE 1 G: 1 SUSPENSION ORAL at 18:27

## 2020-01-01 RX ADMIN — FOLIC ACID 1 MG: 1 TABLET ORAL at 08:34

## 2020-01-01 RX ADMIN — HYDROMORPHONE HYDROCHLORIDE 1 MG: 2 TABLET ORAL at 17:50

## 2020-01-01 RX ADMIN — PANTOPRAZOLE SODIUM 40 MG: 40 TABLET, DELAYED RELEASE ORAL at 08:43

## 2020-01-01 RX ADMIN — SUCRALFATE 1 G: 1 SUSPENSION ORAL at 08:18

## 2020-01-01 RX ADMIN — SODIUM CHLORIDE, POTASSIUM CHLORIDE, SODIUM LACTATE AND CALCIUM CHLORIDE: 600; 310; 30; 20 INJECTION, SOLUTION INTRAVENOUS at 13:49

## 2020-01-01 RX ADMIN — LIDOCAINE HYDROCHLORIDE ANHYDROUS 0.5 ML: 10 INJECTION, SOLUTION INFILTRATION at 10:08

## 2020-01-01 RX ADMIN — FUROSEMIDE 40 MG: 10 INJECTION, SOLUTION INTRAVENOUS at 16:40

## 2020-01-01 RX ADMIN — THIAMINE HCL TAB 100 MG 100 MG: 100 TAB at 08:58

## 2020-01-01 RX ADMIN — SUCRALFATE 1 G: 1 SUSPENSION ORAL at 17:14

## 2020-01-01 RX ADMIN — PANTOPRAZOLE SODIUM 40 MG: 40 TABLET, DELAYED RELEASE ORAL at 06:06

## 2020-01-01 RX ADMIN — SUCRALFATE 1 G: 1 SUSPENSION ORAL at 20:27

## 2020-01-01 RX ADMIN — GABAPENTIN 600 MG: 600 TABLET, FILM COATED ORAL at 14:31

## 2020-01-01 RX ADMIN — ACETYLCYSTEINE 1200 MG: 200 SOLUTION ORAL; RESPIRATORY (INHALATION) at 10:39

## 2020-01-01 RX ADMIN — PANTOPRAZOLE SODIUM 40 MG: 40 TABLET, DELAYED RELEASE ORAL at 16:57

## 2020-01-01 RX ADMIN — PANTOPRAZOLE SODIUM 40 MG: 40 TABLET, DELAYED RELEASE ORAL at 16:22

## 2020-01-01 RX ADMIN — PANTOPRAZOLE SODIUM 40 MG: 40 TABLET, DELAYED RELEASE ORAL at 06:47

## 2020-01-01 RX ADMIN — LIDOCAINE HYDROCHLORIDE 10 ML: 10 INJECTION, SOLUTION EPIDURAL; INFILTRATION; INTRACAUDAL; PERINEURAL at 13:58

## 2020-01-01 RX ADMIN — SODIUM CHLORIDE: 9 INJECTION, SOLUTION INTRAVENOUS at 20:23

## 2020-01-01 RX ADMIN — MULTIPLE VITAMINS W/ MINERALS TAB 1 TABLET: TAB at 09:15

## 2020-01-01 RX ADMIN — GABAPENTIN 600 MG: 600 TABLET, FILM COATED ORAL at 08:53

## 2020-01-01 RX ADMIN — ACETAMINOPHEN 650 MG: 325 TABLET, FILM COATED ORAL at 16:25

## 2020-01-01 RX ADMIN — MULTIPLE VITAMINS W/ MINERALS TAB 1 TABLET: TAB at 08:18

## 2020-01-01 RX ADMIN — GABAPENTIN 600 MG: 600 TABLET, FILM COATED ORAL at 08:14

## 2020-01-01 RX ADMIN — FUROSEMIDE 40 MG: 10 INJECTION, SOLUTION INTRAVENOUS at 08:19

## 2020-01-01 RX ADMIN — PROPOFOL 20 MG: 10 INJECTION, EMULSION INTRAVENOUS at 12:02

## 2020-01-01 RX ADMIN — GABAPENTIN 600 MG: 600 TABLET, FILM COATED ORAL at 21:18

## 2020-01-01 RX ADMIN — PANTOPRAZOLE SODIUM 40 MG: 40 TABLET, DELAYED RELEASE ORAL at 17:44

## 2020-01-01 RX ADMIN — ZOLPIDEM TARTRATE 2.5 MG: 5 TABLET, FILM COATED ORAL at 00:17

## 2020-01-01 RX ADMIN — ACETAMINOPHEN 650 MG: 325 TABLET, FILM COATED ORAL at 13:19

## 2020-01-01 RX ADMIN — SUCRALFATE 1 G: 1 SUSPENSION ORAL at 11:49

## 2020-01-01 RX ADMIN — GABAPENTIN 600 MG: 600 TABLET, FILM COATED ORAL at 20:53

## 2020-01-01 RX ADMIN — LIDOCAINE HYDROCHLORIDE 10 ML: 10 INJECTION, SOLUTION EPIDURAL; INFILTRATION; INTRACAUDAL; PERINEURAL at 12:49

## 2020-01-01 RX ADMIN — LIDOCAINE HYDROCHLORIDE 10 ML: 10 INJECTION, SOLUTION EPIDURAL; INFILTRATION; INTRACAUDAL; PERINEURAL at 14:15

## 2020-01-01 RX ADMIN — PANTOPRAZOLE SODIUM 40 MG: 40 TABLET, DELAYED RELEASE ORAL at 06:26

## 2020-01-01 RX ADMIN — GABAPENTIN 600 MG: 600 TABLET, FILM COATED ORAL at 13:30

## 2020-01-01 RX ADMIN — PANTOPRAZOLE SODIUM 40 MG: 40 TABLET, DELAYED RELEASE ORAL at 05:37

## 2020-01-01 RX ADMIN — OCTREOTIDE ACETATE 50 MCG/HR: 200 INJECTION, SOLUTION INTRAVENOUS; SUBCUTANEOUS at 21:30

## 2020-01-01 RX ADMIN — SUCRALFATE 1 G: 1 SUSPENSION ORAL at 11:35

## 2020-01-01 RX ADMIN — FUROSEMIDE 40 MG: 10 INJECTION, SOLUTION INTRAVENOUS at 12:01

## 2020-01-01 RX ADMIN — SUCRALFATE 1 G: 1 SUSPENSION ORAL at 12:01

## 2020-01-01 RX ADMIN — MULTIPLE VITAMINS W/ MINERALS TAB 1 TABLET: TAB at 08:20

## 2020-01-01 RX ADMIN — ZOLPIDEM TARTRATE 2.5 MG: 5 TABLET, FILM COATED ORAL at 21:42

## 2020-01-01 RX ADMIN — POLYETHYLENE GLYCOL 3350 238 G: 17 POWDER, FOR SOLUTION ORAL at 20:09

## 2020-01-01 RX ADMIN — GABAPENTIN 600 MG: 600 TABLET, FILM COATED ORAL at 09:28

## 2020-01-01 RX ADMIN — GABAPENTIN 600 MG: 600 TABLET, FILM COATED ORAL at 08:43

## 2020-01-01 RX ADMIN — MULTIPLE VITAMINS W/ MINERALS TAB 1 TABLET: TAB at 13:29

## 2020-01-01 RX ADMIN — SUCRALFATE 1 G: 1 SUSPENSION ORAL at 13:01

## 2020-01-01 RX ADMIN — Medication 10 MG: at 14:08

## 2020-01-01 RX ADMIN — MULTIPLE VITAMINS W/ MINERALS TAB 1 TABLET: TAB at 08:27

## 2020-01-01 RX ADMIN — PANTOPRAZOLE SODIUM 40 MG: 40 TABLET, DELAYED RELEASE ORAL at 13:29

## 2020-01-01 RX ADMIN — PROPOFOL 25 MG: 10 INJECTION, EMULSION INTRAVENOUS at 15:53

## 2020-01-01 RX ADMIN — SUCRALFATE 1 G: 1 SUSPENSION ORAL at 21:07

## 2020-01-01 RX ADMIN — ALBUMIN HUMAN 12.5 G: 0.05 INJECTION, SOLUTION INTRAVENOUS at 16:41

## 2020-01-01 RX ADMIN — GABAPENTIN 600 MG: 600 TABLET, FILM COATED ORAL at 20:05

## 2020-01-01 RX ADMIN — PANTOPRAZOLE SODIUM 40 MG: 40 TABLET, DELAYED RELEASE ORAL at 05:34

## 2020-01-01 RX ADMIN — FUROSEMIDE 40 MG: 10 INJECTION, SOLUTION INTRAVENOUS at 13:49

## 2020-01-01 RX ADMIN — GABAPENTIN 600 MG: 600 TABLET, FILM COATED ORAL at 21:06

## 2020-01-01 RX ADMIN — LIDOCAINE HYDROCHLORIDE 7 ML: 10 INJECTION, SOLUTION INFILTRATION; PERINEURAL at 12:37

## 2020-01-01 RX ADMIN — Medication 27 MILLICURIE: at 12:11

## 2020-01-01 RX ADMIN — FUROSEMIDE 40 MG: 10 INJECTION, SOLUTION INTRAVENOUS at 08:09

## 2020-01-01 RX ADMIN — PANTOPRAZOLE SODIUM 40 MG: 40 TABLET, DELAYED RELEASE ORAL at 17:12

## 2020-01-01 RX ADMIN — SODIUM CHLORIDE, POTASSIUM CHLORIDE, SODIUM LACTATE AND CALCIUM CHLORIDE: 600; 310; 30; 20 INJECTION, SOLUTION INTRAVENOUS at 11:59

## 2020-01-01 RX ADMIN — Medication 1 MG: at 21:42

## 2020-01-01 RX ADMIN — GABAPENTIN 600 MG: 600 TABLET, FILM COATED ORAL at 08:58

## 2020-01-01 RX ADMIN — SPIRONOLACTONE 100 MG: 25 TABLET ORAL at 15:46

## 2020-01-01 RX ADMIN — GABAPENTIN 600 MG: 600 TABLET, FILM COATED ORAL at 21:10

## 2020-01-01 RX ADMIN — PANTOPRAZOLE SODIUM 40 MG: 40 TABLET, DELAYED RELEASE ORAL at 06:20

## 2020-01-01 RX ADMIN — SUCRALFATE 1 G: 1 SUSPENSION ORAL at 17:43

## 2020-01-01 RX ADMIN — SPIRONOLACTONE 100 MG: 25 TABLET ORAL at 08:08

## 2020-01-01 RX ADMIN — ALBUMIN HUMAN 25 G: 0.25 SOLUTION INTRAVENOUS at 21:29

## 2020-01-01 RX ADMIN — PANTOPRAZOLE SODIUM 40 MG: 40 TABLET, DELAYED RELEASE ORAL at 16:15

## 2020-01-01 RX ADMIN — GABAPENTIN 600 MG: 600 TABLET, FILM COATED ORAL at 21:28

## 2020-01-01 RX ADMIN — FENTANYL CITRATE 50 MCG: 50 INJECTION, SOLUTION INTRAMUSCULAR; INTRAVENOUS at 16:26

## 2020-01-01 RX ADMIN — IRON SUCROSE 300 MG: 20 INJECTION, SOLUTION INTRAVENOUS at 16:08

## 2020-01-01 RX ADMIN — IRON SUCROSE 300 MG: 20 INJECTION, SOLUTION INTRAVENOUS at 09:50

## 2020-01-01 RX ADMIN — PROPOFOL 25 MG: 10 INJECTION, EMULSION INTRAVENOUS at 15:58

## 2020-01-01 RX ADMIN — PANTOPRAZOLE SODIUM 40 MG: 40 TABLET, DELAYED RELEASE ORAL at 09:32

## 2020-01-01 RX ADMIN — CEFTRIAXONE 2 G: 2 INJECTION, POWDER, FOR SOLUTION INTRAMUSCULAR; INTRAVENOUS at 15:03

## 2020-01-01 RX ADMIN — HEPARIN SODIUM 1 BAG: 200 INJECTION, SOLUTION INTRAVENOUS at 12:18

## 2020-01-01 RX ADMIN — SUCCINYLCHOLINE CHLORIDE 100 MG: 20 INJECTION, SOLUTION INTRAMUSCULAR; INTRAVENOUS; PARENTERAL at 13:54

## 2020-01-01 RX ADMIN — ACETAMINOPHEN 650 MG: 325 TABLET, FILM COATED ORAL at 17:50

## 2020-01-01 RX ADMIN — HYDROMORPHONE HYDROCHLORIDE 1 MG: 2 TABLET ORAL at 13:24

## 2020-01-01 RX ADMIN — SPIRONOLACTONE 100 MG: 25 TABLET ORAL at 08:53

## 2020-01-01 RX ADMIN — SPIRONOLACTONE 100 MG: 25 TABLET ORAL at 09:28

## 2020-01-01 RX ADMIN — THIAMINE HCL TAB 100 MG 100 MG: 100 TAB at 08:35

## 2020-01-01 RX ADMIN — IRON SUCROSE 300 MG: 20 INJECTION, SOLUTION INTRAVENOUS at 08:58

## 2020-01-01 RX ADMIN — FUROSEMIDE 40 MG: 10 INJECTION, SOLUTION INTRAVENOUS at 09:22

## 2020-01-01 RX ADMIN — GABAPENTIN 600 MG: 600 TABLET, FILM COATED ORAL at 08:08

## 2020-01-01 RX ADMIN — SPIRONOLACTONE 100 MG: 25 TABLET ORAL at 08:18

## 2020-01-01 RX ADMIN — ALBUMIN HUMAN 25 G: 0.25 SOLUTION INTRAVENOUS at 05:33

## 2020-01-01 RX ADMIN — LIDOCAINE HYDROCHLORIDE 100 MG: 20 INJECTION, SOLUTION INFILTRATION; PERINEURAL at 15:42

## 2020-01-01 RX ADMIN — SUCRALFATE 1 G: 1 SUSPENSION ORAL at 09:00

## 2020-01-01 RX ADMIN — PANTOPRAZOLE SODIUM 40 MG: 40 TABLET, DELAYED RELEASE ORAL at 06:15

## 2020-01-01 RX ADMIN — GABAPENTIN 600 MG: 600 TABLET, FILM COATED ORAL at 08:28

## 2020-01-01 SDOH — SOCIAL STABILITY: SOCIAL INSECURITY: WITHIN THE LAST YEAR, HAVE YOU BEEN HUMILIATED OR EMOTIONALLY ABUSED IN OTHER WAYS BY YOUR PARTNER OR EX-PARTNER?: NO

## 2020-01-01 SDOH — ECONOMIC STABILITY: TRANSPORTATION INSECURITY
IN THE PAST 12 MONTHS, HAS LACK OF TRANSPORTATION KEPT YOU FROM MEETINGS, WORK, OR FROM GETTING THINGS NEEDED FOR DAILY LIVING?: NO

## 2020-01-01 SDOH — SOCIAL STABILITY: SOCIAL INSECURITY
WITHIN THE LAST YEAR, HAVE TO BEEN RAPED OR FORCED TO HAVE ANY KIND OF SEXUAL ACTIVITY BY YOUR PARTNER OR EX-PARTNER?: NO

## 2020-01-01 SDOH — HEALTH STABILITY: PHYSICAL HEALTH: ON AVERAGE, HOW MANY MINUTES DO YOU ENGAGE IN EXERCISE AT THIS LEVEL?: 0 MIN

## 2020-01-01 SDOH — HEALTH STABILITY: MENTAL HEALTH: HOW OFTEN DO YOU HAVE A DRINK CONTAINING ALCOHOL?: 4 OR MORE TIMES A WEEK

## 2020-01-01 SDOH — SOCIAL STABILITY: SOCIAL INSECURITY
WITHIN THE LAST YEAR, HAVE YOU BEEN KICKED, HIT, SLAPPED, OR OTHERWISE PHYSICALLY HURT BY YOUR PARTNER OR EX-PARTNER?: NO

## 2020-01-01 SDOH — SOCIAL STABILITY: SOCIAL NETWORK: ARE YOU MARRIED, WIDOWED, DIVORCED, SEPARATED, NEVER MARRIED, OR LIVING WITH A PARTNER?: MARRIED

## 2020-01-01 SDOH — HEALTH STABILITY: MENTAL HEALTH
STRESS IS WHEN SOMEONE FEELS TENSE, NERVOUS, ANXIOUS, OR CAN'T SLEEP AT NIGHT BECAUSE THEIR MIND IS TROUBLED. HOW STRESSED ARE YOU?: NOT AT ALL

## 2020-01-01 SDOH — ECONOMIC STABILITY: FOOD INSECURITY: WITHIN THE PAST 12 MONTHS, THE FOOD YOU BOUGHT JUST DIDN'T LAST AND YOU DIDN'T HAVE MONEY TO GET MORE.: NEVER TRUE

## 2020-01-01 SDOH — HEALTH STABILITY: PHYSICAL HEALTH: ON AVERAGE, HOW MANY DAYS PER WEEK DO YOU ENGAGE IN MODERATE TO STRENUOUS EXERCISE (LIKE A BRISK WALK)?: 0 DAYS

## 2020-01-01 SDOH — ECONOMIC STABILITY: TRANSPORTATION INSECURITY
IN THE PAST 12 MONTHS, HAS THE LACK OF TRANSPORTATION KEPT YOU FROM MEDICAL APPOINTMENTS OR FROM GETTING MEDICATIONS?: NO

## 2020-01-01 SDOH — HEALTH STABILITY: MENTAL HEALTH: HOW MANY STANDARD DRINKS CONTAINING ALCOHOL DO YOU HAVE ON A TYPICAL DAY?: 1 OR 2

## 2020-01-01 SDOH — ECONOMIC STABILITY: FOOD INSECURITY: WITHIN THE PAST 12 MONTHS, YOU WORRIED THAT YOUR FOOD WOULD RUN OUT BEFORE YOU GOT MONEY TO BUY MORE.: NEVER TRUE

## 2020-01-01 SDOH — SOCIAL STABILITY: SOCIAL INSECURITY: WITHIN THE LAST YEAR, HAVE YOU BEEN AFRAID OF YOUR PARTNER OR EX-PARTNER?: NO

## 2020-01-01 SDOH — HEALTH STABILITY: MENTAL HEALTH: HOW OFTEN DO YOU HAVE 6 OR MORE DRINKS ON ONE OCCASION?: NEVER

## 2020-01-01 SDOH — ECONOMIC STABILITY: INCOME INSECURITY: HOW HARD IS IT FOR YOU TO PAY FOR THE VERY BASICS LIKE FOOD, HOUSING, MEDICAL CARE, AND HEATING?: NOT HARD AT ALL

## 2020-01-01 ASSESSMENT — ACTIVITIES OF DAILY LIVING (ADL)
ADLS_ACUITY_SCORE: 17
ADLS_ACUITY_SCORE: 16
ADLS_ACUITY_SCORE: 19
ADLS_ACUITY_SCORE: 17
ADLS_ACUITY_SCORE: 19
ADLS_ACUITY_SCORE: 25
ADLS_ACUITY_SCORE: 18
ADLS_ACUITY_SCORE: 25
ADLS_ACUITY_SCORE: 17
ADLS_ACUITY_SCORE: 25
ADLS_ACUITY_SCORE: 18
ADLS_ACUITY_SCORE: 21
ADLS_ACUITY_SCORE: 18
ADLS_ACUITY_SCORE: 23
ADLS_ACUITY_SCORE: 17
ADLS_ACUITY_SCORE: 17
ADLS_ACUITY_SCORE: 21
ADLS_ACUITY_SCORE: 17
ADLS_ACUITY_SCORE: 18
ADLS_ACUITY_SCORE: 23
ADLS_ACUITY_SCORE: 23
ADLS_ACUITY_SCORE: 25
ADLS_ACUITY_SCORE: 23
ADLS_ACUITY_SCORE: 16
ADLS_ACUITY_SCORE: 18
ADLS_ACUITY_SCORE: 17
ADLS_ACUITY_SCORE: 18
ADLS_ACUITY_SCORE: 17
ADLS_ACUITY_SCORE: 23
ADLS_ACUITY_SCORE: 17
ADLS_ACUITY_SCORE: 25
ADLS_ACUITY_SCORE: 17
ADLS_ACUITY_SCORE: 24
ADLS_ACUITY_SCORE: 17
ADLS_ACUITY_SCORE: 18
ADLS_ACUITY_SCORE: 25
ADLS_ACUITY_SCORE: 17
ADLS_ACUITY_SCORE: 18
ADLS_ACUITY_SCORE: 18
ADLS_ACUITY_SCORE: 25
ADLS_ACUITY_SCORE: 25
ADLS_ACUITY_SCORE: 22
ADLS_ACUITY_SCORE: 17
ADLS_ACUITY_SCORE: 22
ADLS_ACUITY_SCORE: 25
ADLS_ACUITY_SCORE: 25
ADLS_ACUITY_SCORE: 17
ADLS_ACUITY_SCORE: 24
ADLS_ACUITY_SCORE: 21
ADLS_ACUITY_SCORE: 22
ADLS_ACUITY_SCORE: 17
ADLS_ACUITY_SCORE: 18
ADLS_ACUITY_SCORE: 18
ADLS_ACUITY_SCORE: 17
ADLS_ACUITY_SCORE: 17
ADLS_ACUITY_SCORE: 22
ADLS_ACUITY_SCORE: 17
ADLS_ACUITY_SCORE: 25
ADLS_ACUITY_SCORE: 17
ADLS_ACUITY_SCORE: 18
ADLS_ACUITY_SCORE: 25
ADLS_ACUITY_SCORE: 18
ADLS_ACUITY_SCORE: 17
ADLS_ACUITY_SCORE: 25
ADLS_ACUITY_SCORE: 17
ADLS_ACUITY_SCORE: 25
ADLS_ACUITY_SCORE: 17
ADLS_ACUITY_SCORE: 18
ADLS_ACUITY_SCORE: 22
ADLS_ACUITY_SCORE: 18
ADLS_ACUITY_SCORE: 17
ADLS_ACUITY_SCORE: 21
ADLS_ACUITY_SCORE: 23
ADLS_ACUITY_SCORE: 21
ADLS_ACUITY_SCORE: 17
ADLS_ACUITY_SCORE: 17
ADLS_ACUITY_SCORE: 25
ADLS_ACUITY_SCORE: 18
ADLS_ACUITY_SCORE: 17
ADLS_ACUITY_SCORE: 17
ADLS_ACUITY_SCORE: 18
ADLS_ACUITY_SCORE: 24
ADLS_ACUITY_SCORE: 22
ADLS_ACUITY_SCORE: 18
ADLS_ACUITY_SCORE: 17
ADLS_ACUITY_SCORE: 23
ADLS_ACUITY_SCORE: 17
ADLS_ACUITY_SCORE: 24
ADLS_ACUITY_SCORE: 22
ADLS_ACUITY_SCORE: 23
ADLS_ACUITY_SCORE: 24
ADLS_ACUITY_SCORE: 24
ADLS_ACUITY_SCORE: 18
ADLS_ACUITY_SCORE: 21
ADLS_ACUITY_SCORE: 18
ADLS_ACUITY_SCORE: 18
ADLS_ACUITY_SCORE: 21
ADLS_ACUITY_SCORE: 19
ADLS_ACUITY_SCORE: 17
ADLS_ACUITY_SCORE: 25
ADLS_ACUITY_SCORE: 17
ADLS_ACUITY_SCORE: 16
ADLS_ACUITY_SCORE: 17
ADLS_ACUITY_SCORE: 25
ADLS_ACUITY_SCORE: 19
ADLS_ACUITY_SCORE: 18
DEPENDENT_IADLS:: INDEPENDENT
ADLS_ACUITY_SCORE: 17
ADLS_ACUITY_SCORE: 18
ADLS_ACUITY_SCORE: 25
ADLS_ACUITY_SCORE: 18
ADLS_ACUITY_SCORE: 25
ADLS_ACUITY_SCORE: 25
ADLS_ACUITY_SCORE: 17
ADLS_ACUITY_SCORE: 19
ADLS_ACUITY_SCORE: 25
ADLS_ACUITY_SCORE: 17
ADLS_ACUITY_SCORE: 25
ADLS_ACUITY_SCORE: 24
ADLS_ACUITY_SCORE: 22
ADLS_ACUITY_SCORE: 17
ADLS_ACUITY_SCORE: 17
ADLS_ACUITY_SCORE: 24
ADLS_ACUITY_SCORE: 25
ADLS_ACUITY_SCORE: 24
PREVIOUS_RESPONSIBILITIES: MEDICATION MANAGEMENT;MEAL PREP
ADLS_ACUITY_SCORE: 25
ADLS_ACUITY_SCORE: 18

## 2020-01-01 ASSESSMENT — MIFFLIN-ST. JEOR
SCORE: 1799.47
SCORE: 1815.34
SCORE: 1815.34
SCORE: 1799.47

## 2020-01-01 ASSESSMENT — ENCOUNTER SYMPTOMS
DYSRHYTHMIAS: 1
SEIZURES: 0
SEIZURES: 0
SHORTNESS OF BREATH: 0
WOUND: 1
ABDOMINAL PAIN: 0
SEIZURES: 0
FEVER: 0
DYSRHYTHMIAS: 1
BRUISES/BLEEDS EASILY: 1
DYSRHYTHMIAS: 1

## 2020-01-01 ASSESSMENT — COPD QUESTIONNAIRES
COPD: 0

## 2020-01-01 ASSESSMENT — LIFESTYLE VARIABLES
TOBACCO_USE: 0

## 2020-01-06 ENCOUNTER — OFFICE VISIT (OUTPATIENT)
Dept: CARDIOLOGY | Facility: CLINIC | Age: 75
End: 2020-01-06
Payer: MEDICARE

## 2020-01-06 ENCOUNTER — CARE COORDINATION (OUTPATIENT)
Dept: CARDIOLOGY | Facility: CLINIC | Age: 75
End: 2020-01-06

## 2020-01-06 VITALS
SYSTOLIC BLOOD PRESSURE: 105 MMHG | WEIGHT: 250 LBS | DIASTOLIC BLOOD PRESSURE: 62 MMHG | HEIGHT: 74 IN | BODY MASS INDEX: 32.08 KG/M2 | HEART RATE: 76 BPM

## 2020-01-06 DIAGNOSIS — F10.10 ALCOHOL ABUSE: ICD-10-CM

## 2020-01-06 DIAGNOSIS — I34.0 MODERATE MITRAL REGURGITATION: ICD-10-CM

## 2020-01-06 DIAGNOSIS — K70.30 ALCOHOLIC CIRRHOSIS, UNSPECIFIED WHETHER ASCITES PRESENT (H): ICD-10-CM

## 2020-01-06 DIAGNOSIS — I50.20 HEART FAILURE WITH REDUCED EJECTION FRACTION, NYHA CLASS III (H): ICD-10-CM

## 2020-01-06 DIAGNOSIS — Z79.01 ON RIVAROXABAN THERAPY: ICD-10-CM

## 2020-01-06 DIAGNOSIS — I48.21 PERMANENT ATRIAL FIBRILLATION (H): ICD-10-CM

## 2020-01-06 DIAGNOSIS — I27.20 PULMONARY HYPERTENSION (H): ICD-10-CM

## 2020-01-06 DIAGNOSIS — Z86.711 HISTORY OF PULMONARY EMBOLISM: ICD-10-CM

## 2020-01-06 DIAGNOSIS — N18.30 CHRONIC KIDNEY DISEASE, STAGE 3 (MODERATE): ICD-10-CM

## 2020-01-06 DIAGNOSIS — I25.5 ISCHEMIC CARDIOMYOPATHY: ICD-10-CM

## 2020-01-06 DIAGNOSIS — D63.8 ANEMIA IN OTHER CHRONIC DISEASES CLASSIFIED ELSEWHERE: ICD-10-CM

## 2020-01-06 LAB
ANION GAP SERPL CALCULATED.3IONS-SCNC: 7.2 MMOL/L (ref 6–17)
BUN SERPL-MCNC: 32 MG/DL (ref 7–30)
CALCIUM SERPL-MCNC: 8.5 MG/DL (ref 8.5–10.5)
CHLORIDE SERPL-SCNC: 100 MMOL/L (ref 98–107)
CO2 SERPL-SCNC: 27 MMOL/L (ref 23–29)
CREAT SERPL-MCNC: 1.9 MG/DL (ref 0.7–1.3)
GFR SERPL CREATININE-BSD FRML MDRD: 35 ML/MIN/{1.73_M2}
GLUCOSE SERPL-MCNC: 120 MG/DL (ref 70–105)
POTASSIUM SERPL-SCNC: 4.2 MMOL/L (ref 3.5–5.1)
SODIUM SERPL-SCNC: 130 MMOL/L (ref 136–145)

## 2020-01-06 PROCEDURE — 80048 BASIC METABOLIC PNL TOTAL CA: CPT | Performed by: INTERNAL MEDICINE

## 2020-01-06 PROCEDURE — 36415 COLL VENOUS BLD VENIPUNCTURE: CPT | Performed by: INTERNAL MEDICINE

## 2020-01-06 PROCEDURE — 99215 OFFICE O/P EST HI 40 MIN: CPT | Performed by: PHYSICIAN ASSISTANT

## 2020-01-06 RX ORDER — ALBUMIN (HUMAN) 12.5 G/50ML
12.5 SOLUTION INTRAVENOUS ONCE
Status: CANCELLED | OUTPATIENT
Start: 2020-01-06 | End: 2020-01-06

## 2020-01-06 ASSESSMENT — MIFFLIN-ST. JEOR: SCORE: 1933.74

## 2020-01-06 NOTE — PATIENT INSTRUCTIONS
Call [facility] nurse for any questions or concerns Mon-Fri 8am-4pm:                                                #[phone]                                       For concerns after hours:                                               #[phone]     1: Medication changes: continue current medications.   Start wearing velcro wraps everyday.    Keep your sodium intake less than 2,000 mg a day.    Please weigh yourself everyday and write and down and bring it back to clinic.      2: Plan from today: I'll see you back in about 2 weeks with labs before that visit.      3: Lab results: see attached;   Component      Latest Ref Rng & Units 1/6/2020   Sodium      136 - 145 mmol/L 130 (L)   Potassium      3.5 - 5.1 mmol/L 4.2   Chloride      98 - 107 mmol/L 100   Carbon Dioxide      23 - 29 mmol/L 27   Anion Gap      6 - 17 mmol/L 7.2   Glucose      70 - 105 mg/dL 120 (H)   Urea Nitrogen      7 - 30 mg/dL 32 (H)   Creatinine      0.70 - 1.30 mg/dL 1.90 (H)   GFR Estimate      >60 mL/min/1.73:m2 35 (L)   GFR Estimate If Black      >60 mL/min/1.73:m2 42 (L)   Calcium      8.5 - 10.5 mg/dL 8.5

## 2020-01-06 NOTE — LETTER
2020      Main Hardy MD  [address]  [facility] [address]      RE: Antonio KU Ashley       Dear Colleague,    I had the pleasure of seeing Antonio Ramirez in the [facility].    Service Date: 2020      PRIMARY CARDIOLOGIST:  Dr. Downs.      REASON FOR VISIT:  [facility] enrollment.      HISTORY OF PRESENT ILLNESS:  Mr. Ramirez is a complex 74-year-old gentleman seen today at the request of Dr. Ovalle for [facility] enrollment to see if we can prevent hospitalizations.  His past medical history is significant for the followin.  Alcoholic cirrhosis of the liver with scheduled standing paracentesis every 2 weeks, with the last several weeks taking off about 7 liters each.   2.  Alcohol dependency with ongoing alcohol intake in spite of recommendations otherwise.   3.  Coronary artery disease with history of MI and CAB.   4.  Ischemic cardiomyopathy with EF 40%-45% and chronic systolic heart failure.   5.  Severe pulmonary hypertension with elevated pulmonary pressures, likely multifactorial due to liver failure, hypoproteinemia and heart failure, with the patient declining further PH workup by Dr. Ovalle.   6.  Persistent AFib, on anticoagulation.   7.  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   8.  History of large-cell B-cell non-Hodgkin's lymphoma.   9.  CKD.   10.  Chronic anemia and thrombocytopenia.   11.  History of PEA and possibly VT arrest in 2019.   12.  History of PE and antiphospholipid antibody syndrome with history of IVC filter placement, on chronic anticoagulation.   13.  Mild to moderate mitral regurg, mild mitral stenosis, mild tricuspid regurgitation.      Mr. Ramirez comes in today at the request of Dr. Ovalle to see if we can help optimize his volume status and prevent his multiple hospitalizations.  On our initial discussion, he fully admits he does not want  to be here and he is not sure that this can be productive.  He tells me he overall feels okay but he just does not do anything.  He is unable to exercise because his legs are so swollen that his stump is too enlarged to fit in his prosthetic.  He denies mehran orthopnea or PND but becomes significantly short of breath just prior to his paracenteses every 2 weeks.  As soon as his paracentesis is complete, his shortness of breath improves but his fluid tends to reaccumulate within a week.  He denies significant scrotal edema.  He denies orthopnea or PND, but he is primarily sedentary.  He is somewhat short of breath getting dressed and short of breath taking a shower.  They just started trying to watch their salt but are not necessarily under 2000 mg and he eats sporadically.      SOCIAL HISTORY:  He comes in today with his wife, Helena.  If she is around, he eats better.  He otherwise has a high-protein Ensure shake in the morning, maybe some fruit for lunch and then a normal dinner.  Again, they are just starting to read labels.  Ongoing daily alcohol use.  This was not discussed in detail today.  He is a lifelong nonsmoker.      PHYSICAL EXAMINATION:   GENERAL:  Exam reveals an elderly, frail, irritable gentleman in no acute distress.    HEENT:  He is normocephalic and atraumatic.   HEART:  Irregularly irregular with a 2/6 systolic murmur heard best at the left sternal border.   LUNGS:  Markedly diminished bilaterally, although I do not appreciate wheezes or rales.   EXTREMITIES:  He has 2 to 3+ pitting edema in his right leg up into his thigh.  Stump has 2+ pitting edema up into his thigh.   ABDOMEN:  Distended consistent with ascites.      LABORATORY STUDIES:  Labs show creatinine 1.9, BUN 32, potassium 4.2, sodium 130.      ASSESSMENT AND PLAN:  This is a complex 74-year-old gentleman with alcoholic cirrhosis with biweekly paracenteses, alcohol dependence, low protein levels, history of coronary artery disease and  mild cardiomyopathy, pulmonary hypertension likely secondary to all of the above, CKD, chronic thrombocytopenia and anemia who presents today for enrollment in the [facility].  Initially he was very clear that he is not really interested in being there, nor does he have any confidence that we can help him.  We had a long discussion about his health history and how complex it is.  He clearly takes the appearance of wanting to be in charge yet shows some degree of vulnerability of frustration of being so sick for so long and having this out of his control.  We discussed today that with his severe liver disease, managing his volume will be difficult.  At this point, I suspect he hardly absorbs his p.o. meds with all his gut edema and peripheral edema, and diuresis will be difficult without renal failure as he will become intravascularly depleted while still third-spacing.  We discussed working together for a trial of several weeks to see if we can make a difference, if not, I certainly won't hold that against him.  At this point, we decided to do a diuretic infusion to see if we can start getting his volume status improved at least intravascularly and try to draw some of his edema out of his legs and third space.  I do not want him to have paracentesis on the same day, as I think that may be too much of a fluid shift.  As such, on 01/09 he will undergo Infusion Clinic, where he will get an 80 mg bolus of IV Lasix, followed by 20 mg an hour for 4 hours and typical labs.  He can hold his torsemide that morning as well as the spironolactone.  We will keep him otherwise on his baseline dose of torsemide of 20 mg daily and spironolactone 25 mg daily.  Per review of notes, it sounds that his renal function has worsened.  These have been increased and I suspect this is because he has become intravascularly depleted and not been able to shift fluid, or he has remained so hypervolemic that his kidneys are congested.   Ideally, we would send him for right heart cath, but at this point he is not really even sure how much he is willing to do for us, so we will do a few rounds of trying to get him more euvolemic and see how things go.  Ideally down the road, I would like to try him on a higher dose of spironolactone, but we will see how his labs work.  In addition, we talked about watching his sodium and having him decrease this to less than 2000 mg daily and weighing himself daily.  He is, of course, reluctant to do both of these but will consider this.  We did not discuss alcohol today, as this has been discussed ad nauseam and I do not think that I will be able to get him to want to change there.  As we develop rapport, we will see if we can make that better.  At this point, I suspect his problems are more related to his cirrhosis, as his cardiomyopathy is not that severe, but we are certainly happy to help manage volume and go from there.      We did discuss that he plays a large part in how successful or not successful this is, because I can recommend all of the correct medications in the world, and if he does not take them or follow other measures, it won't make a bit of difference.  He seemed to consider this, and we will see how things go.      In addition to getting him in for infusion, I will see him back in about 2 weeks with labs before that visit.      Thank you for allowing me to participate in this patient's care.      More than 50 minutes was spent in counseling and coordination of care, including more than 50% in counseling and coordination of care, including review of complex past medical history, discussion of lifestyle and the patient's wishes.      ALEXA Encarnacion PA-C             D: 2020   T: 2020   MT: AIDAN      Name:     GALILEA LUGO   MRN:      [id number]        Account:      [id number]   :      1945           Service Date: 2020      Document:  [id number]         Outpatient Encounter Medications as of 1/6/2020   Medication Sig Dispense Refill     aspirin 81 MG EC tablet Take 81 mg by mouth daily       Dietary Management Product (VASCULERA) TABS Take 1 tablet by mouth daily 90 tablet 3     gabapentin (NEURONTIN) 600 MG tablet Take 600 mg by mouth 2 times daily       loperamide (IMODIUM) 2 MG capsule Take 4 mg by mouth as needed for diarrhea       rivaroxaban ANTICOAGULANT (XARELTO) 15 MG TABS tablet Take 1 tablet (15 mg) by mouth daily (with dinner)       spironolactone (ALDACTONE) 25 MG tablet Take 1 tablet (25 mg) by mouth daily 100 tablet 3     torsemide (DEMADEX) 20 MG tablet Take 1 tablet (20 mg) by mouth daily 100 tablet 3     vitamin B1 (THIAMINE) 100 MG tablet Take 1 tablet (100 mg) by mouth daily (Patient not taking: Reported on 1/6/2020)       [DISCONTINUED] Dermatological Products, Misc. (EPICERAM) EMUL Externally apply 1 applicator topically 2 times daily (Patient not taking: Reported on 1/6/2020) 225 g 11     [DISCONTINUED] multivitamin, therapeutic (THERA-VIT) TABS tablet Take 1 tablet by mouth daily (Patient not taking: Reported on 12/16/2019)       No facility-administered encounter medications on file as of 1/6/2020.        Again, thank you for allowing me to participate in the care of your patient.      Sincerely,    Juli Smith PA-C     [facility]

## 2020-01-06 NOTE — PROGRESS NOTES
[id number]  HPI and Plan:   See dictation    Orders this Visit:  Orders Placed This Encounter   Procedures     Follow-Up with [facility]Y visit     No orders of the defined types were placed in this encounter.    Medications Discontinued During This Encounter   Medication Reason     multivitamin, therapeutic (THERA-VIT) TABS tablet Medication Reconciliation Clean Up     Dermatological Products, Misc. (EPICERAM) EMUL Medication Reconciliation Clean Up         Encounter Diagnoses   Name Primary?     Pulmonary hypertension (H)      Heart failure with reduced ejection fraction, NYHA class III (H)      Alcoholic cirrhosis, unspecified whether ascites present (H)      Chronic kidney disease, stage 3 (moderate) (H)      Anemia in other chronic diseases classified elsewhere      Alcohol abuse      Moderate mitral regurgitation      Ischemic cardiomyopathy      Permanent atrial fibrillation      History of pulmonary embolism      On rivaroxaban therapy        CURRENT MEDICATIONS:  Current Outpatient Medications   Medication Sig Dispense Refill     aspirin 81 MG EC tablet Take 81 mg by mouth daily       Dietary Management Product (VASCULERA) TABS Take 1 tablet by mouth daily 90 tablet 3     gabapentin (NEURONTIN) 600 MG tablet Take 600 mg by mouth 2 times daily       loperamide (IMODIUM) 2 MG capsule Take 4 mg by mouth as needed for diarrhea       rivaroxaban ANTICOAGULANT (XARELTO) 15 MG TABS tablet Take 1 tablet (15 mg) by mouth daily (with dinner)       spironolactone (ALDACTONE) 25 MG tablet Take 1 tablet (25 mg) by mouth daily 100 tablet 3     torsemide (DEMADEX) 20 MG tablet Take 1 tablet (20 mg) by mouth daily 100 tablet 3     vitamin B1 (THIAMINE) 100 MG tablet Take 1 tablet (100 mg) by mouth daily (Patient not taking: Reported on 1/6/2020)         ALLERGIES     Allergies   Allergen Reactions     Kdc:Minocycline+[place] [facility] GI Disturbance     11/01/16-PT IS NOT AWARE OF THIS REACTION     Ciprofloxacin Itching  "    Severe itching \"like ants were crawling\"     Hmg-Coa-R Inhibitors Other (See Comments)     Rhabdomyolysis occurred within a couple days  Rhabdomyolysis       PAST MEDICAL HISTORY:  Past Medical History:   Diagnosis Date     Alcoholism (H)      Amputated left leg (H)      Anemia      Anticardiolipin antibody syndrome (H)      Antiphospholipid antibody syndrome (H)      Antiplatelet or antithrombotic long-term use      Aortic root dilatation (H)      Aortic stenosis      Arthritis      Balance problem      Coronary artery disease     CABG 2007: SVG to LAD, SVG to diagonal, SVG to OM; cardiac cath 5/17/18: thrombectomy for restenosis of stents-sent for urgent CABG, cath 5/14/2007: GRECIA x2 to LAD, [place] to diagonal     Gastro-oesophageal reflux disease      Heart attack (H) 2007    apparently arrested, cardiac X5     Hiatal hernia      Hypercholesterolemia      Hypertension      Ischemic cardiomyopathy      Left foot drop      Liver disease     alcoholic liver disease, alcoholic cirrohsosis, portal hypertension     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)     ?When diagnosed, patient not aware of this     Moderate mitral regurgitation      Monoclonal gammopathy      Neuropathy      Noninfectious ileitis     diverticulitis of colon     Nonrheumatic tricuspid valve regurgitation      Paroxysmal atrial fibrillation (H)      PE (pulmonary embolism) 2006    saddle emboli 2006     Prostate cancer (H) 2000    Treated with radiation (seed implants in 2000) -- no problems since     Pulmonary hypertension (H)      Renal disease     kidney stones     Syncope      Thrombocytopenia (H)      Urethral stricture      Venous thrombosis     IVC filter and lysis procedures 2007       PAST SURGICAL HISTORY:  Past Surgical History:   Procedure Laterality Date     AMPUTATE LEG BELOW KNEE Left 04/2014    related to gangrene, started as foot ulcer related to neuropathy     AMPUTATE LEG BELOW KNEE Left 12/4/2017    Procedure: AMPUTATE " LEG BELOW KNEE;  Exploration of Left Leg Below Knee Amputation Stump with Ligation of Bleeders.;  Surgeon: Leandro Arreola MD;  Location:  OR     APPENDECTOMY       APPLY WOUND VAC Left 12/6/2017    Procedure: APPLY WOUND VAC;;  Surgeon: Saima Howard MD;  Location:  [facility]     ARTHROPLASTY HIP Right 11/27/2018    Procedure: RIGHT TOTAL HIP ARTHROPLASTY;  Surgeon: Saima Howard MD;  Location:  OR     ARTHROPLASTY KNEE Right 9/19/2014    Procedure: ARTHROPLASTY KNEE;  Surgeon: Saima Howard MD;  Location:  OR     CARDIAC SURGERY      CABG     CHOLECYSTECTOMY       COLONOSCOPY       COMBINED CYSTOSCOPY, RETROGRADES, EXCHANGE STENT URETER(S) Left 7/24/2018    Procedure: COMBINED CYSTOSCOPY, RETROGRADES, EXCHANGE STENT URETER(S);  CYSTOSCOPY, BILATERAL RETROGRADES, BILATERAL URETERAL STENT EXCHANGE ;  Surgeon: Matt Cervantes MD;  Location:  OR     CRANIOTOMY Right 4/7/2018    Procedure: CRANIOTOMY;  Right Craniotomy For Subdural Hematoma;  Surgeon: Jono Issa MD;  Location:  OR     CYSTOSCOPY, DILATE URETHRA, COMBINED N/A 10/12/2016    Procedure: COMBINED CYSTOSCOPY, DILATE URETHRA;  Surgeon: Dimitry Bello MD;  Location:  OR     CYSTOSCOPY, REMOVE STENT(S), COMBINED Right 7/24/2018    Procedure: COMBINED CYSTOSCOPY, REMOVE STENT(S);;  Surgeon: Jose Hunter MD;  Location:  OR     ENDOSCOPIC STRIPPING VEIN(S)       esophagogastroduodenoscopy       ESOPHAGOSCOPY, GASTROSCOPY, DUODENOSCOPY (EGD), COMBINED N/A 3/11/2019    Procedure: COMBINED ESOPHAGOSCOPY, GASTROSCOPY, DUODENOSCOPY (EGD), BIOPSY SINGLE OR MULTIPLE;  Surgeon: Katherin Boyd MD;  Location:  GI     ESOPHAGOSCOPY, GASTROSCOPY, DUODENOSCOPY (EGD), COMBINED N/A 9/27/2019    Procedure: ESOPHAGOGASTRODUODENOSCOPY, WITH FOREIGN BODY REMOVAL;  Surgeon: Raegan Mckeon MD;  Location:  GI     EYE SURGERY      cataracts     GENITOURINARY SURGERY      Prostate Seen Implants      HERNIA REPAIR      Umbilical     INCISION AND DRAINAGE LOWER EXTREMITY, COMBINED Left 2017    Procedure: COMBINED INCISION AND DRAINAGE LOWER EXTREMITY;  INCISION AND DRAINAGE OF LEFT BELOW KNEE AMPUTATION ABSCESS, WOUND VAC PLACEMENT;  Surgeon: Saima Howard MD;  Location:  OR     IR IVC FILTER PLACEMENT       IRRIGATION AND DEBRIDEMENT LOWER EXTREMITY, COMBINED Left 2017    Procedure: COMBINED IRRIGATION AND DEBRIDEMENT LOWER EXTREMITY;  IRRIGATION AND DEBRIDEMENT OF LEFT BELOW KNEE AMPUTATION SITE WITH WOUND VAC REPLACEMENT;  Surgeon: Saima Howard MD;  Location:  SD     IRRIGATION AND DEBRIDEMENT LOWER EXTREMITY, COMBINED Left 2017    Procedure: COMBINED IRRIGATION AND DEBRIDEMENT LOWER EXTREMITY;  IRRIGATION AND DEBRIDEMENT OF LEFT BELOW THE KNEE AMPUTATION AND SHORTENING OF THE TIBIA WITH WOUND CLOSURE;  Surgeon: Saima Howard MD;  Location:  OR     LASER HOLMIUM LITHOTRIPSY URETER(S), INSERT STENT, COMBINED Bilateral 2018    Procedure: COMBINED CYSTOSCOPY, URETEROSCOPY, LASER HOLMIUM LITHOTRIPSY URETER(S), INSERT STENT;  CYSTOSCOPY, BILATERAL URETEROSCOPY,  HOLMIUM LASER LITHOTRIPSY, BILATERAL STENT PLACEMENT, STONE BASKETING;  Surgeon: Jose Hunter MD;  Location:  OR     LASER HOLMIUM LITHOTRIPSY URETER(S), INSERT STENT, COMBINED Left 2018    Procedure: COMBINED CYSTOSCOPY, URETEROSCOPY, LASER HOLMIUM LITHOTRIPSY URETER(S), INSERT STENT;  CYSTOSCOPY, LEFT URETEROSCOPY, LEFT LASER HOLMIUM LITHOTRIPSY URETER(S) REMOVAL BILATERAL STENTS;  Surgeon: Jose Hunter MD;  Location:  OR     ORTHOPEDIC SURGERY      (R) knee scope     ORTHOPEDIC SURGERY      total hip      ORTHOPEDIC SURGERY      elbow surgery       FAMILY HISTORY:  Family History   Problem Relation Age of Onset     Lung Cancer Mother      Heart Failure Father          age 87       SOCIAL HISTORY:  Social History     Socioeconomic History     Marital status:      Spouse name: None      Number of children: 2     Years of education: None     Highest education level: None   Occupational History     Occupation: Retired    Social Needs     Financial resource strain: None     Food insecurity:     Worry: None     Inability: None     Transportation needs:     Medical: None     Non-medical: None   Tobacco Use     Smoking status: Never Smoker     Smokeless tobacco: Never Used   Substance and Sexual Activity     Alcohol use: Yes     Comment: quit 10/2015; now abt 1-2 vodka's per night     Drug use: No     Sexual activity: Not Currently     Partners: Female   Lifestyle     Physical activity:     Days per week: None     Minutes per session: None     Stress: None   Relationships     Social connections:     Talks on phone: None     Gets together: None     Attends Church service: None     Active member of club or organization: None     Attends meetings of clubs or organizations: None     Relationship status: None     Intimate partner violence:     Fear of current or ex partner: None     Emotionally abused: None     Physically abused: None     Forced sexual activity: None   Other Topics Concern     Parent/sibling w/ CABG, MI or angioplasty before 65F 55M? Not Asked   Social History Narrative    , 2 children, retired . He does not have a living will but desires full code.  (last updated 2/28/2019)        Review of Systems:  Skin:  Negative     Eyes:  Negative    ENT:  Negative    Respiratory:  Positive for shortness of breath(breathing faster pt thinks due to fluid build up )  Cardiovascular:    Positive for;edema;fatigue;lower extremity symptoms;exercise intolerance  Gastroenterology: Positive for poor appetite(ascites and abdominal bloating)  Genitourinary:  not assessed    Musculoskeletal:  Positive for muscular weakness;joint stiffness  Neurologic:  Positive for numbness or tingling of hands;numbness or tingling of feet  Psychiatric:  Positive for sleep  "disturbances;excessive alcohol consumption  Heme/Lymph/Imm:  Positive for anemia;allergies  Endocrine:  Negative      Physical Exam:  Vitals: /62   Pulse 76   Ht 1.88 m (6' 2\")   Wt 113.4 kg (250 lb)   BMI 32.10 kg/m     Please refer to dictation for physical exam    Recent Lab Results:  LIPID RESULTS:  No results found for: CHOL, HDL, LDL, TRIG, CHOLHDLRATIO    LIVER ENZYME RESULTS:  Lab Results   Component Value Date    AST 27 09/27/2019    ALT 10 09/27/2019       CBC RESULTS:  Lab Results   Component Value Date    WBC 4.2 09/27/2019    RBC 3.43 (L) 09/27/2019    HGB 9.7 (L) 09/27/2019    HCT 30.5 (L) 09/27/2019    MCV 89 09/27/2019    MCH 28.3 09/27/2019    MCHC 31.8 09/27/2019    RDW 16.6 (H) 09/27/2019     12/30/2019       BMP RESULTS:  Lab Results   Component Value Date     (L) 01/06/2020    POTASSIUM 4.2 01/06/2020    CHLORIDE 100 01/06/2020    CO2 27 01/06/2020    ANIONGAP 7.2 01/06/2020     (H) 01/06/2020    BUN 32 (H) 01/06/2020    CR 1.90 (H) 01/06/2020    GFRESTIMATED 35 (L) 01/06/2020    GFRESTBLACK 42 (L) 01/06/2020    BENNIE 8.5 01/06/2020        A1C RESULTS:  Lab Results   Component Value Date    A1C 4.6 05/18/2018       INR RESULTS:  Lab Results   Component Value Date    INR 2.05 (H) 12/30/2019    INR 2.01 (H) 11/18/2019           CC  No referring provider defined for this encounter.    "

## 2020-01-06 NOTE — LETTER
1/6/2020    Main Hardy MD  [address]  [facility] [address]    RE: Antonio Ramirez       Dear Colleague,    I had the pleasure of seeing Antonio Ramirez in the [facility].    [id number]  HPI and Plan:   See dictation    Orders this Visit:  Orders Placed This Encounter   Procedures     Follow-Up with [facility] visit     No orders of the defined types were placed in this encounter.    Medications Discontinued During This Encounter   Medication Reason     multivitamin, therapeutic (THERA-VIT) TABS tablet Medication Reconciliation Clean Up     Dermatological Products, Misc. (EPICERAM) EMUL Medication Reconciliation Clean Up         Encounter Diagnoses   Name Primary?     Pulmonary hypertension (H)      Heart failure with reduced ejection fraction, NYHA class III (H)      Alcoholic cirrhosis, unspecified whether ascites present (H)      Chronic kidney disease, stage 3 (moderate) (H)      Anemia in other chronic diseases classified elsewhere      Alcohol abuse      Moderate mitral regurgitation      Ischemic cardiomyopathy      Permanent atrial fibrillation      History of pulmonary embolism      On rivaroxaban therapy        CURRENT MEDICATIONS:  Current Outpatient Medications   Medication Sig Dispense Refill     aspirin 81 MG EC tablet Take 81 mg by mouth daily       Dietary Management Product (VASCULERA) TABS Take 1 tablet by mouth daily 90 tablet 3     gabapentin (NEURONTIN) 600 MG tablet Take 600 mg by mouth 2 times daily       loperamide (IMODIUM) 2 MG capsule Take 4 mg by mouth as needed for diarrhea       rivaroxaban ANTICOAGULANT (XARELTO) 15 MG TABS tablet Take 1 tablet (15 mg) by mouth daily (with dinner)       spironolactone (ALDACTONE) 25 MG tablet Take 1 tablet (25 mg) by mouth daily 100 tablet 3     torsemide (DEMADEX) 20 MG tablet Take 1 tablet (20 mg) by mouth daily 100 tablet 3     vitamin B1 (THIAMINE) 100 MG tablet Take 1 tablet (100 mg) by mouth daily  "(Patient not taking: Reported on 1/6/2020)         ALLERGIES     Allergies   Allergen Reactions     Kdc:Minocycline+[place] Blue Fcf GI Disturbance     11/01/16-PT IS NOT AWARE OF THIS REACTION     Ciprofloxacin Itching     Severe itching \"like ants were crawling\"     Hmg-Coa-R Inhibitors Other (See Comments)     Rhabdomyolysis occurred within a couple days  Rhabdomyolysis       PAST MEDICAL HISTORY:  Past Medical History:   Diagnosis Date     Alcoholism (H)      Amputated left leg (H)      Anemia      Anticardiolipin antibody syndrome (H)      Antiphospholipid antibody syndrome (H)      Antiplatelet or antithrombotic long-term use      Aortic root dilatation (H)      Aortic stenosis      Arthritis      Balance problem      Coronary artery disease     CABG 2007: SVG to LAD, SVG to diagonal, SVG to OM; cardiac cath 5/17/18: thrombectomy for restenosis of stents-sent for urgent CABG, cath 5/14/2007: GRECIA x2 to LAD, [place] to diagonal     Gastro-oesophageal reflux disease      Heart attack (H) 2007    apparently arrested, cardiac X5     Hiatal hernia      Hypercholesterolemia      Hypertension      Ischemic cardiomyopathy      Left foot drop      Liver disease     alcoholic liver disease, alcoholic cirrohsosis, portal hypertension     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)     ?When diagnosed, patient not aware of this     Moderate mitral regurgitation      Monoclonal gammopathy      Neuropathy      Noninfectious ileitis     diverticulitis of colon     Nonrheumatic tricuspid valve regurgitation      Paroxysmal atrial fibrillation (H)      PE (pulmonary embolism) 2006    saddle emboli 2006     Prostate cancer (H) 2000    Treated with radiation (seed implants in 2000) -- no problems since     Pulmonary hypertension (H)      Renal disease     kidney stones     Syncope      Thrombocytopenia (H)      Urethral stricture      Venous thrombosis     IVC filter and lysis procedures 2007       PAST SURGICAL " HISTORY:  Past Surgical History:   Procedure Laterality Date     AMPUTATE LEG BELOW KNEE Left 04/2014    related to gangrene, started as foot ulcer related to neuropathy     AMPUTATE LEG BELOW KNEE Left 12/4/2017    Procedure: AMPUTATE LEG BELOW KNEE;  Exploration of Left Leg Below Knee Amputation Stump with Ligation of Bleeders.;  Surgeon: Leandro Arreola MD;  Location:  OR     APPENDECTOMY       APPLY WOUND VAC Left 12/6/2017    Procedure: APPLY WOUND VAC;;  Surgeon: Saima Howard MD;  Location:  SD     ARTHROPLASTY HIP Right 11/27/2018    Procedure: RIGHT TOTAL HIP ARTHROPLASTY;  Surgeon: Saima Howard MD;  Location:  OR     ARTHROPLASTY KNEE Right 9/19/2014    Procedure: ARTHROPLASTY KNEE;  Surgeon: Saima Howard MD;  Location:  OR     CARDIAC SURGERY      CABG     CHOLECYSTECTOMY       COLONOSCOPY       COMBINED CYSTOSCOPY, RETROGRADES, EXCHANGE STENT URETER(S) Left 7/24/2018    Procedure: COMBINED CYSTOSCOPY, RETROGRADES, EXCHANGE STENT URETER(S);  CYSTOSCOPY, BILATERAL RETROGRADES, BILATERAL URETERAL STENT EXCHANGE ;  Surgeon: Matt Cervantes MD;  Location:  OR     CRANIOTOMY Right 4/7/2018    Procedure: CRANIOTOMY;  Right Craniotomy For Subdural Hematoma;  Surgeon: Jono Issa MD;  Location:  OR     CYSTOSCOPY, DILATE URETHRA, COMBINED N/A 10/12/2016    Procedure: COMBINED CYSTOSCOPY, DILATE URETHRA;  Surgeon: Dimitry Bello MD;  Location:  OR     CYSTOSCOPY, REMOVE STENT(S), COMBINED Right 7/24/2018    Procedure: COMBINED CYSTOSCOPY, REMOVE STENT(S);;  Surgeon: Jose Hunter MD;  Location:  OR     ENDOSCOPIC STRIPPING VEIN(S)       esophagogastroduodenoscopy       ESOPHAGOSCOPY, GASTROSCOPY, DUODENOSCOPY (EGD), COMBINED N/A 3/11/2019    Procedure: COMBINED ESOPHAGOSCOPY, GASTROSCOPY, DUODENOSCOPY (EGD), BIOPSY SINGLE OR MULTIPLE;  Surgeon: Katherin Boyd MD;  Location:  GI     ESOPHAGOSCOPY, GASTROSCOPY, DUODENOSCOPY (EGD),  COMBINED N/A 9/27/2019    Procedure: ESOPHAGOGASTRODUODENOSCOPY, WITH FOREIGN BODY REMOVAL;  Surgeon: Raegan Mckeon MD;  Location:  GI     EYE SURGERY      cataracts     GENITOURINARY SURGERY      Prostate Seen Implants     HERNIA REPAIR      Umbilical     INCISION AND DRAINAGE LOWER EXTREMITY, COMBINED Left 12/1/2017    Procedure: COMBINED INCISION AND DRAINAGE LOWER EXTREMITY;  INCISION AND DRAINAGE OF LEFT BELOW KNEE AMPUTATION ABSCESS, WOUND VAC PLACEMENT;  Surgeon: Saima Howard MD;  Location:  OR     IR IVC FILTER PLACEMENT       IRRIGATION AND DEBRIDEMENT LOWER EXTREMITY, COMBINED Left 12/6/2017    Procedure: COMBINED IRRIGATION AND DEBRIDEMENT LOWER EXTREMITY;  IRRIGATION AND DEBRIDEMENT OF LEFT BELOW KNEE AMPUTATION SITE WITH WOUND VAC REPLACEMENT;  Surgeon: Saima Howard MD;  Location:  SD     IRRIGATION AND DEBRIDEMENT LOWER EXTREMITY, COMBINED Left 12/8/2017    Procedure: COMBINED IRRIGATION AND DEBRIDEMENT LOWER EXTREMITY;  IRRIGATION AND DEBRIDEMENT OF LEFT BELOW THE KNEE AMPUTATION AND SHORTENING OF THE TIBIA WITH WOUND CLOSURE;  Surgeon: Saima Howard MD;  Location:  OR     LASER HOLMIUM LITHOTRIPSY URETER(S), INSERT STENT, COMBINED Bilateral 6/28/2018    Procedure: COMBINED CYSTOSCOPY, URETEROSCOPY, LASER HOLMIUM LITHOTRIPSY URETER(S), INSERT STENT;  CYSTOSCOPY, BILATERAL URETEROSCOPY,  HOLMIUM LASER LITHOTRIPSY, BILATERAL STENT PLACEMENT, STONE BASKETING;  Surgeon: Jose Hunter MD;  Location:  OR     LASER HOLMIUM LITHOTRIPSY URETER(S), INSERT STENT, COMBINED Left 8/14/2018    Procedure: COMBINED CYSTOSCOPY, URETEROSCOPY, LASER HOLMIUM LITHOTRIPSY URETER(S), INSERT STENT;  CYSTOSCOPY, LEFT URETEROSCOPY, LEFT LASER HOLMIUM LITHOTRIPSY URETER(S) REMOVAL BILATERAL STENTS;  Surgeon: Jose Hunter MD;  Location:  OR     ORTHOPEDIC SURGERY      (R) knee scope     ORTHOPEDIC SURGERY      total hip      ORTHOPEDIC SURGERY      elbow surgery       FAMILY  HISTORY:  Family History   Problem Relation Age of Onset     Lung Cancer Mother      Heart Failure Father          age 87       SOCIAL HISTORY:  Social History     Socioeconomic History     Marital status:      Spouse name: None     Number of children: 2     Years of education: None     Highest education level: None   Occupational History     Occupation: Retired    Social Needs     Financial resource strain: None     Food insecurity:     Worry: None     Inability: None     Transportation needs:     Medical: None     Non-medical: None   Tobacco Use     Smoking status: Never Smoker     Smokeless tobacco: Never Used   Substance and Sexual Activity     Alcohol use: Yes     Comment: quit 10/2015; now abt 1-2 vodka's per night     Drug use: No     Sexual activity: Not Currently     Partners: Female   Lifestyle     Physical activity:     Days per week: None     Minutes per session: None     Stress: None   Relationships     Social connections:     Talks on phone: None     Gets together: None     Attends Congregation service: None     Active member of club or organization: None     Attends meetings of clubs or organizations: None     Relationship status: None     Intimate partner violence:     Fear of current or ex partner: None     Emotionally abused: None     Physically abused: None     Forced sexual activity: None   Other Topics Concern     Parent/sibling w/ CABG, MI or angioplasty before 65F 55M? Not Asked   Social History Narrative    , 2 children, retired . He does not have a living will but desires full code.  (last updated 2019)        Review of Systems:  Skin:  Negative     Eyes:  Negative    ENT:  Negative    Respiratory:  Positive for shortness of breath(breathing faster pt thinks due to fluid build up )  Cardiovascular:    Positive for;edema;fatigue;lower extremity symptoms;exercise intolerance  Gastroenterology: Positive for poor appetite(ascites and abdominal  "bloating)  Genitourinary:  not assessed    Musculoskeletal:  Positive for muscular weakness;joint stiffness  Neurologic:  Positive for numbness or tingling of hands;numbness or tingling of feet  Psychiatric:  Positive for sleep disturbances;excessive alcohol consumption  Heme/Lymph/Imm:  Positive for anemia;allergies  Endocrine:  Negative      Physical Exam:  Vitals: /62   Pulse 76   Ht 1.88 m (6' 2\")   Wt 113.4 kg (250 lb)   BMI 32.10 kg/m      Please refer to dictation for physical exam    Recent Lab Results:  LIPID RESULTS:  No results found for: CHOL, HDL, LDL, TRIG, CHOLHDLRATIO    LIVER ENZYME RESULTS:  Lab Results   Component Value Date    AST 27 09/27/2019    ALT 10 09/27/2019       CBC RESULTS:  Lab Results   Component Value Date    WBC 4.2 09/27/2019    RBC 3.43 (L) 09/27/2019    HGB 9.7 (L) 09/27/2019    HCT 30.5 (L) 09/27/2019    MCV 89 09/27/2019    MCH 28.3 09/27/2019    MCHC 31.8 09/27/2019    RDW 16.6 (H) 09/27/2019     12/30/2019       BMP RESULTS:  Lab Results   Component Value Date     (L) 01/06/2020    POTASSIUM 4.2 01/06/2020    CHLORIDE 100 01/06/2020    CO2 27 01/06/2020    ANIONGAP 7.2 01/06/2020     (H) 01/06/2020    BUN 32 (H) 01/06/2020    CR 1.90 (H) 01/06/2020    GFRESTIMATED 35 (L) 01/06/2020    GFRESTBLACK 42 (L) 01/06/2020    BENNIE 8.5 01/06/2020        A1C RESULTS:  Lab Results   Component Value Date    A1C 4.6 05/18/2018       INR RESULTS:  Lab Results   Component Value Date    INR 2.05 (H) 12/30/2019    INR 2.01 (H) 11/18/2019           CC  No referring provider defined for this encounter.      Thank you for allowing me to participate in the care of your patient.      Sincerely,     Juli Smith PA-C     [facility]    cc:   No referring provider defined for this encounter.        "

## 2020-01-06 NOTE — NURSING NOTE
"[facility]    Met with Antonio \"Sage\" and his wife Trudy after their visit with Juli SORTO. 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     Living situation:   With wife, using w/c for mobility d/t prosthetic issue    Med set up:   Self, wife    Barriers to HF follow-up:   Pt's wife stated \"we thought this visit might not be worth while, but it was interesting\". Unclear what level of acceptance, interest is re: [facility].     CM/HF education topics we reviewed:   Sodium: I asked, \"Do you want to talk about salt?\" and patient stated, \"No.\" We briefly discussed relationship between fluid and salt, and options of how to monitor/restrict salt; directed him to resources in items provided today, and [employer] website   Daily weights       Education materials provided:   Guide to Heart Failure   Weight log   HF stoplight tool      Return appointment:   Arranged for IV diuretic infusion appointment for 1/8/2020.     Arranged for follow-up CORE visit 1/20/2020, he preferred to have repeat labs done at next paracentesis visit 1/13--pro BMP and prontBNP orders placed into that [facility] encounter. I reviewed with them that addnl labs may be needed 1/20/20, pending results of 1/13 labs or other changes.     Future Appointments   Date Time Provider Department Center   1/8/2020 11:30 AM [facility]   1/13/2020  2:00 PM [address] ANA   1/20/2020 12:30 PM [facility]          Labs: Lab results from today were reviewed with the patient during the office visit. A copy of the results were provided on the After Visit Summary.     It was a pleasure meeting with Sage and Trudy today.     Iza Guerrero RN BSN   11:15 AM 01/06/20                  "

## 2020-01-07 ENCOUNTER — TELEPHONE (OUTPATIENT)
Dept: MEDSURG UNIT | Facility: CLINIC | Age: 75
End: 2020-01-07

## 2020-01-07 NOTE — PROGRESS NOTES
Service Date: 2020      PRIMARY CARDIOLOGIST:  Dr. Downs.      REASON FOR VISIT:  [facility] enrollment.      HISTORY OF PRESENT ILLNESS:  Mr. Ramirez is a complex 74-year-old gentleman seen today at the request of Dr. Ovalle for [facility] enrollment to see if we can prevent hospitalizations.  His past medical history is significant for the followin.  Alcoholic cirrhosis of the liver with scheduled standing paracentesis every 2 weeks, with the last several weeks taking off about 7 liters each.   2.  Alcohol dependency with ongoing alcohol intake in spite of recommendations otherwise.   3.  Coronary artery disease with history of MI and CAB.   4.  Ischemic cardiomyopathy with EF 40%-45% and chronic systolic heart failure.   5.  Severe pulmonary hypertension with elevated pulmonary pressures, likely multifactorial due to liver failure, hypoproteinemia and heart failure, with the patient declining further PH workup by Dr. Ovalle.   6.  Persistent AFib, on anticoagulation.   7.  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   8.  History of large-cell B-cell non-Hodgkin's lymphoma.   9.  CKD.   10.  Chronic anemia and thrombocytopenia.   11.  History of PEA and possibly VT arrest in 2019.   12.  History of PE and antiphospholipid antibody syndrome with history of IVC filter placement, on chronic anticoagulation.   13.  Mild to moderate mitral regurg, mild mitral stenosis, mild tricuspid regurgitation.      Mr. Ramirez comes in today at the request of Dr. Ovalle to see if we can help optimize his volume status and prevent his multiple hospitalizations.  On our initial discussion, he fully admits he does not want to be here and he is not sure that this can be productive.  He tells me he overall feels okay but he just does not do anything.  He is unable to exercise because his legs are so swollen that his stump is too enlarged to fit  in his prosthetic.  He denies mehran orthopnea or PND but becomes significantly short of breath just prior to his paracenteses every 2 weeks.  As soon as his paracentesis is complete, his shortness of breath improves but his fluid tends to reaccumulate within a week.  He denies significant scrotal edema.  He denies orthopnea or PND, but he is primarily sedentary.  He is somewhat short of breath getting dressed and short of breath taking a shower.  They just started trying to watch their salt but are not necessarily under 2000 mg and he eats sporadically.      SOCIAL HISTORY:  He comes in today with his wife, Helena.  If she is around, he eats better.  He otherwise has a high-protein Ensure shake in the morning, maybe some fruit for lunch and then a normal dinner.  Again, they are just starting to read labels.  Ongoing daily alcohol use.  This was not discussed in detail today.  He is a lifelong nonsmoker.      PHYSICAL EXAMINATION:   GENERAL:  Exam reveals an elderly, frail, irritable gentleman in no acute distress.    HEENT:  He is normocephalic and atraumatic.   HEART:  Irregularly irregular with a 2/6 systolic murmur heard best at the left sternal border.   LUNGS:  Markedly diminished bilaterally, although I do not appreciate wheezes or rales.   EXTREMITIES:  He has 2 to 3+ pitting edema in his right leg up into his thigh.  Stump has 2+ pitting edema up into his thigh.   ABDOMEN:  Distended consistent with ascites.      LABORATORY STUDIES:  Labs show creatinine 1.9, BUN 32, potassium 4.2, sodium 130.      ASSESSMENT AND PLAN:  This is a complex 74-year-old gentleman with alcoholic cirrhosis with biweekly paracenteses, alcohol dependence, low protein levels, history of coronary artery disease and mild cardiomyopathy, pulmonary hypertension likely secondary to all of the above, CKD, chronic thrombocytopenia and anemia who presents today for enrollment in the [facility].  Initially he was very clear that he is  not really interested in being there, nor does he have any confidence that we can help him.  We had a long discussion about his health history and how complex it is.  He clearly takes the appearance of wanting to be in charge yet shows some degree of vulnerability of frustration of being so sick for so long and having this out of his control.  We discussed today that with his severe liver disease, managing his volume will be difficult.  At this point, I suspect he hardly absorbs his p.o. meds with all his gut edema and peripheral edema, and diuresis will be difficult without renal failure as he will become intravascularly depleted while still third-spacing.  We discussed working together for a trial of several weeks to see if we can make a difference, if not, I certainly won't hold that against him.  At this point, we decided to do a diuretic infusion to see if we can start getting his volume status improved at least intravascularly and try to draw some of his edema out of his legs and third space.  I do not want him to have paracentesis on the same day, as I think that may be too much of a fluid shift.  As such, on 01/09 he will undergo Infusion Clinic, where he will get an 80 mg bolus of IV Lasix, followed by 20 mg an hour for 4 hours and typical labs.  He can hold his torsemide that morning as well as the spironolactone.  We will keep him otherwise on his baseline dose of torsemide of 20 mg daily and spironolactone 25 mg daily.  Per review of notes, it sounds that his renal function has worsened.  These have been increased and I suspect this is because he has become intravascularly depleted and not been able to shift fluid, or he has remained so hypervolemic that his kidneys are congested.  Ideally, we would send him for right heart cath, but at this point he is not really even sure how much he is willing to do for us, so we will do a few rounds of trying to get him more euvolemic and see how things go.  Ideally  down the road, I would like to try him on a higher dose of spironolactone, but we will see how his labs work.  In addition, we talked about watching his sodium and having him decrease this to less than 2000 mg daily and weighing himself daily.  He is, of course, reluctant to do both of these but will consider this.  We did not discuss alcohol today, as this has been discussed ad nauseam and I do not think that I will be able to get him to want to change there.  As we develop rapport, we will see if we can make that better.  At this point, I suspect his problems are more related to his cirrhosis, as his cardiomyopathy is not that severe, but we are certainly happy to help manage volume and go from there.      We did discuss that he plays a large part in how successful or not successful this is, because I can recommend all of the correct medications in the world, and if he does not take them or follow other measures, it won't make a bit of difference.  He seemed to consider this, and we will see how things go.      In addition to getting him in for infusion, I will see him back in about 2 weeks with labs before that visit.      Thank you for allowing me to participate in this patient's care.      More than 50 minutes was spent in counseling and coordination of care, including more than 50% in counseling and coordination of care, including review of complex past medical history, discussion of lifestyle and the patient's wishes.      ALEXA Encarnacion PA-C             D: 2020   T: 2020   MT: AIDAN      Name:     GALILEA LUGO   MRN:      [id number]        Account:      [id number]   :      1945           Service Date: 2020      Document: [id number]

## 2020-01-08 ENCOUNTER — HOSPITAL ENCOUNTER (OUTPATIENT)
Dept: INFUSION THERAPY | Facility: CLINIC | Age: 75
End: 2020-01-08
Attending: INTERNAL MEDICINE | Admitting: INTERNAL MEDICINE
Payer: MEDICARE

## 2020-01-08 ENCOUNTER — HOSPITAL ENCOUNTER (OUTPATIENT)
Facility: CLINIC | Age: 75
Discharge: HOME | End: 2020-01-08
Attending: INTERNAL MEDICINE | Admitting: INTERNAL MEDICINE
Payer: MEDICARE

## 2020-01-08 VITALS
OXYGEN SATURATION: 97 % | SYSTOLIC BLOOD PRESSURE: 111 MMHG | RESPIRATION RATE: 18 BRPM | TEMPERATURE: 98 F | BODY MASS INDEX: 32.47 KG/M2 | HEART RATE: 67 BPM | DIASTOLIC BLOOD PRESSURE: 53 MMHG | WEIGHT: 252.87 LBS

## 2020-01-08 LAB
BUN SERPL-MCNC: 35 MG/DL (ref 7–30)
BUN SERPL-MCNC: 36 MG/DL (ref 7–30)
CREAT SERPL-MCNC: 1.75 MG/DL (ref 0.66–1.25)
CREAT SERPL-MCNC: 1.81 MG/DL (ref 0.66–1.25)
GFR SERPL CREATININE-BSD FRML MDRD: 35 ML/MIN/{1.73_M2}
GFR SERPL CREATININE-BSD FRML MDRD: 37 ML/MIN/{1.73_M2}
MAGNESIUM SERPL-MCNC: 2.8 MG/DL (ref 1.6–2.3)
POTASSIUM SERPL-SCNC: 4.3 MMOL/L (ref 3.4–5.3)
POTASSIUM SERPL-SCNC: 4.3 MMOL/L (ref 3.4–5.3)
SODIUM SERPL-SCNC: 135 MMOL/L (ref 133–144)

## 2020-01-08 PROCEDURE — 25000128 H RX IP 250 OP 636: Performed by: PHYSICIAN ASSISTANT

## 2020-01-08 PROCEDURE — 25800030 ZZH RX IP 258 OP 636: Performed by: PHYSICIAN ASSISTANT

## 2020-01-08 PROCEDURE — 96366 THER/PROPH/DIAG IV INF ADDON: CPT

## 2020-01-08 PROCEDURE — 96365 THER/PROPH/DIAG IV INF INIT: CPT

## 2020-01-08 PROCEDURE — 36415 COLL VENOUS BLD VENIPUNCTURE: CPT | Performed by: INTERNAL MEDICINE

## 2020-01-08 PROCEDURE — 82565 ASSAY OF CREATININE: CPT | Mod: 91 | Performed by: INTERNAL MEDICINE

## 2020-01-08 PROCEDURE — 36415 COLL VENOUS BLD VENIPUNCTURE: CPT

## 2020-01-08 PROCEDURE — 83735 ASSAY OF MAGNESIUM: CPT | Performed by: INTERNAL MEDICINE

## 2020-01-08 PROCEDURE — 84295 ASSAY OF SERUM SODIUM: CPT | Performed by: INTERNAL MEDICINE

## 2020-01-08 PROCEDURE — 84132 ASSAY OF SERUM POTASSIUM: CPT | Performed by: INTERNAL MEDICINE

## 2020-01-08 PROCEDURE — 40000859 ZZH STATISTIC IV OP-OVERFLOW

## 2020-01-08 PROCEDURE — 99214 OFFICE O/P EST MOD 30 MIN: CPT | Performed by: NURSE PRACTITIONER

## 2020-01-08 PROCEDURE — 84520 ASSAY OF UREA NITROGEN: CPT | Mod: 91 | Performed by: INTERNAL MEDICINE

## 2020-01-08 RX ORDER — MAGNESIUM SULFATE HEPTAHYDRATE 40 MG/ML
4 INJECTION, SOLUTION INTRAVENOUS EVERY 4 HOURS PRN
Status: DISCONTINUED | OUTPATIENT
Start: 2020-01-08 | End: 2020-01-09 | Stop reason: HOSPADM

## 2020-01-08 RX ORDER — POTASSIUM CHLORIDE 7.45 MG/ML
10 INJECTION INTRAVENOUS
Status: DISCONTINUED | OUTPATIENT
Start: 2020-01-08 | End: 2020-01-09 | Stop reason: HOSPADM

## 2020-01-08 RX ORDER — FUROSEMIDE 10 MG/ML
80 INJECTION INTRAMUSCULAR; INTRAVENOUS ONCE
Status: COMPLETED | OUTPATIENT
Start: 2020-01-08 | End: 2020-01-08

## 2020-01-08 RX ORDER — POTASSIUM CL/LIDO/0.9 % NACL 10MEQ/0.1L
10 INTRAVENOUS SOLUTION, PIGGYBACK (ML) INTRAVENOUS
Status: DISCONTINUED | OUTPATIENT
Start: 2020-01-08 | End: 2020-01-09 | Stop reason: HOSPADM

## 2020-01-08 RX ORDER — POTASSIUM CHLORIDE 29.8 MG/ML
20 INJECTION INTRAVENOUS
Status: DISCONTINUED | OUTPATIENT
Start: 2020-01-08 | End: 2020-01-09 | Stop reason: HOSPADM

## 2020-01-08 RX ORDER — POTASSIUM CHLORIDE 1500 MG/1
20-40 TABLET, EXTENDED RELEASE ORAL
Status: DISCONTINUED | OUTPATIENT
Start: 2020-01-08 | End: 2020-01-09 | Stop reason: HOSPADM

## 2020-01-08 RX ORDER — LIDOCAINE 40 MG/G
CREAM TOPICAL
Status: DISCONTINUED | OUTPATIENT
Start: 2020-01-08 | End: 2020-01-09 | Stop reason: HOSPADM

## 2020-01-08 RX ORDER — POTASSIUM CHLORIDE 1.5 G/1.58G
20-40 POWDER, FOR SOLUTION ORAL
Status: DISCONTINUED | OUTPATIENT
Start: 2020-01-08 | End: 2020-01-09 | Stop reason: HOSPADM

## 2020-01-08 RX ADMIN — FUROSEMIDE 20 MG/HR: 10 INJECTION, SOLUTION INTRAVENOUS at 12:34

## 2020-01-08 RX ADMIN — FUROSEMIDE 80 MG: 10 INJECTION, SOLUTION INTRAVENOUS at 12:17

## 2020-01-08 NOTE — DISCHARGE INSTRUCTIONS
) Resume medications    2) Try to read labels of sodium.    Sodium Restriction Goals:       Breakfast: 300 mg       Lunch:  500 mg       Dinner: 1000 mg     3) Return to see Juli Smith 1/20/2020.

## 2020-01-08 NOTE — PROGRESS NOTES
Care [facility] Admission Nursing Note    Reason for admission: Diuretic therapy- Lasix infusion   CS arrival time: 1140  Accompanied by: wife  Name/phone of DC : Kwaab  Cell # [phone]  Medications held: Torsemide and Spironolactone  Abnormal assessment/labs: Creat 1.81, Mag 2.8, K 4.3, Na 135 , BUN 35  Education/questions answered: Diuretic therapy explained by RN and Storm Pavon RN   Pt. Has had IV Diuretic therapy before and Paracentesis every 2 weeks   Plan: Lasix gtt 20 mg gtt started at 1235  Run 4 hours 1235- 1635

## 2020-01-08 NOTE — PROGRESS NOTES
Patient taking sips po fluids.  Declines food.  Reading.  Changed out the recliner for his comfort.  VSS.  Lab work to be done at [phone]. [phone]  Report to Niki TIWARI RN.

## 2020-01-08 NOTE — PROGRESS NOTES
1515 Report received from Anuja Bentley RN.  1530 Vineet James, NANCY at bedside to speak with pt.   1625 Discharge instructions given to pt. Verbal understanding received.   1720 Pt discharged per w/c to private vehicle. Wife picking pt up. All personal belongings taken with pt.

## 2020-01-08 NOTE — PROGRESS NOTES
Care [facility] Admission Nursing Note    Reason for admission: Diuretic therapy- Lasix gtt  CS arrival time: 1130  Accompanied by: wife  Name/phone of DC : y prime  [phone]  Medications held:   Abnormal assessment/labs:   If abnormal, provider notified:   Education/questions answered:  Plan:

## 2020-01-08 NOTE — PROGRESS NOTES
[facility] DIURETIC INFUSION VISIT  Antonio Ramirez  : 1945    Date: 2020  Primary Cardiologist: Juli MCDANIEL and Dr. Downs      HPI:  Mr. Ramirez is a complex 74-year-old gentleman seen today for infusion clinic. Lasix 80 mg bolus, and lasix 20 mg /hour.   His past medical history is significant for the followin.  Alcoholic cirrhosis of the liver with scheduled standing paracentesis every 2 weeks, with the last several weeks taking off about 7 liters each.   2.  Alcohol dependency with ongoing alcohol intake in spite of recommendations otherwise.   3.  Coronary artery disease with history of MI and CAB.   4.  Ischemic cardiomyopathy with EF 40%-45% and chronic systolic heart failure.   5.  Severe pulmonary hypertension with elevated pulmonary pressures, likely multifactorial due to liver failure, hypoproteinemia and heart failure, with the patient declining further PH workup by Dr. Ovalle.   6.  Persistent AFib, on anticoagulation.   7.  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   8.  History of large-cell B-cell non-Hodgkin's lymphoma.   9.  CKD.   10.  Chronic anemia and thrombocytopenia.   11.  History of PEA and possibly VT arrest in 2019.   12.  History of PE and antiphospholipid antibody syndrome with history of IVC filter placement, on chronic anticoagulation.   13.  Mild to moderate mitral regurg, mild mitral stenosis, mild tricuspid regurgitation.     Complaining of more shortness of breath and fatigue.     Diet review:  States he doesn't eat much, drinks vodka. He drinks a high-protein Ensure shake in the morning, maybe some fruit for lunch and then a normal dinner.  Again, they are just starting to read labels.  Ongoing daily alcohol use. He is a lifelong nonsmoker.       Physical Exam:  Vitals: /53   Pulse 67   Temp 98  F (36.7  C) (Oral)   Resp 18   Wt 114.4 kg (252 lb 4.8 oz)   SpO2 97%   BMI 32.39  kg/m     PHYSICAL EXAMINATION:   GENERAL:  Exam reveals an elderly, frail, irritable gentleman in no acute distress.    HEENT:  He is normocephalic and atraumatic.   HEART:  Irregularly irregular with a 2/6 systolic murmur heard best at the left sternal border.   LUNGS:  Markedly diminished bilaterally, although I do not appreciate wheezes or rales.   EXTREMITIES:  He has 2 to 3+ pitting edema in his right leg up into his thigh.  Stump has 2+ pitting edema up into his thigh.   ABDOMEN:  Distended consistent with ascites      LABORATORY STUDIES: Na 135; K+ 4.3; BUN 35; creat. 1.81      ASSESSMENT & PLAN  This is a complex 74-year-old gentleman with alcoholic cirrhosis with biweekly paracenteses, alcohol dependence, low protein levels, history of coronary artery disease and mild cardiomyopathy, pulmonary hypertension likely secondary to all of the above, CKD, chronic thrombocytopenia and anemia who presents for diuretic infusion of lasix.     CHF teaching given.  Urine output fair.     Return to see Juli Smith 1/20/2020        DIPAK Lugo CNP 1/8/2020 3:06 PM      Orders this Visit:  Orders Placed This Encounter   Procedures     Urea nitrogen     Creatinine     Magnesium     Potassium     Sodium     Potassium     Magnesium     Creatinine     Potassium     Urea nitrogen     EKG 12-lead, tracing only     Vital Signs     Pulse oximetry nursing     Weigh patient     Measure urine output     Patient Teaching: Heart Failure     Peripheral IV catheter     Activity: Up ad kalyani     Notify Provider     Notify Provider     May discharge when: other (specify in comments) IF Systolic blood pressure is greater than or equal to 90 mmHg AND patient is ambulatory without symptoms.     Discharge planning     Oxygen: Nasal cannula     Orders Placed This Encounter   Medications     lidocaine 1 % 0.1-1 mL     lidocaine (LMX4) cream     sodium chloride (PF) 0.9% PF flush 3 mL     sodium chloride (PF) 0.9% PF flush 3 mL      "potassium chloride ER (K-DUR/KLOR-CON M) CR tablet 20-40 mEq     potassium chloride (KLOR-CON) Packet 20-40 mEq     potassium chloride 10 mEq in 100 mL sterile water intermittent infusion (premix)     potassium chloride 10 mEq in 100 mL intermittent infusion with 10 mg lidocaine     potassium chloride 20 mEq in 50 mL intermittent infusion     magnesium sulfate 4 g in 100 mL sterile water (premade)     furosemide (LASIX) injection 80 mg     furosemide (LASIX) 100 mg in sodium chloride 0.9 % 100 mL infusion     There are no discontinued medications.      @DX@    CURRENT MEDICATIONS:  Medications Prior to Admission   Medication Sig Dispense Refill Last Dose     aspirin 81 MG EC tablet Take 81 mg by mouth daily   1/7/2020 at Unknown time     Dietary Management Product (VASCULERA) TABS Take 1 tablet by mouth daily 90 tablet 3 Past Month at Unknown time     gabapentin (NEURONTIN) 600 MG tablet Take 600 mg by mouth 2 times daily   1/8/2020 at Unknown time     loperamide (IMODIUM) 2 MG capsule Take 4 mg by mouth as needed for diarrhea   Past Month     rivaroxaban ANTICOAGULANT (XARELTO) 15 MG TABS tablet Take 1 tablet (15 mg) by mouth daily (with dinner)   1/7/2020 at pm     spironolactone (ALDACTONE) 25 MG tablet Take 1 tablet (25 mg) by mouth daily 100 tablet 3 1/7/2020 at am     torsemide (DEMADEX) 20 MG tablet Take 1 tablet (20 mg) by mouth daily 100 tablet 3 1/7/2020 at am     vitamin B1 (THIAMINE) 100 MG tablet Take 1 tablet (100 mg) by mouth daily (Patient not taking: Reported on 1/6/2020)   More than a month at Unknown time       ALLERGIES     Allergies   Allergen Reactions     Kdc:Minocycline+[place] [facility] GI Disturbance     11/01/16-PT IS NOT AWARE OF THIS REACTION     Ciprofloxacin Itching     Severe itching \"like ants were crawling\"     Hmg-Coa-R Inhibitors Other (See Comments)     Rhabdomyolysis occurred within a couple days  Rhabdomyolysis       PAST MEDICAL HISTORY:  Past Medical History:   Diagnosis " Date     Alcoholism (H)      Amputated left leg (H)      Anemia      Anticardiolipin antibody syndrome (H)      Antiphospholipid antibody syndrome (H)      Antiplatelet or antithrombotic long-term use      Aortic root dilatation (H)      Aortic stenosis      Arthritis      Balance problem      Coronary artery disease     CABG 2007: SVG to LAD, SVG to diagonal, SVG to OM; cardiac cath 5/17/18: thrombectomy for restenosis of stents-sent for urgent CABG, cath 5/14/2007: GRECIA x2 to LAD, [place] to diagonal     Gastro-oesophageal reflux disease      Heart attack (H) 2007    apparently arrested, cardiac X5     Hiatal hernia      Hypercholesterolemia      Hypertension      Ischemic cardiomyopathy      Left foot drop      Liver disease     alcoholic liver disease, alcoholic cirrohsosis, portal hypertension     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)     ?When diagnosed, patient not aware of this     Moderate mitral regurgitation      Monoclonal gammopathy      Neuropathy      Noninfectious ileitis     diverticulitis of colon     Nonrheumatic tricuspid valve regurgitation      Paroxysmal atrial fibrillation (H)      PE (pulmonary embolism) 2006    saddle emboli 2006     Prostate cancer (H) 2000    Treated with radiation (seed implants in 2000) -- no problems since     Pulmonary hypertension (H)      Renal disease     kidney stones     Syncope      Thrombocytopenia (H)      Urethral stricture      Venous thrombosis     IVC filter and lysis procedures 2007       PAST SURGICAL HISTORY:  Past Surgical History:   Procedure Laterality Date     AMPUTATE LEG BELOW KNEE Left 04/2014    related to gangrene, started as foot ulcer related to neuropathy     AMPUTATE LEG BELOW KNEE Left 12/4/2017    Procedure: AMPUTATE LEG BELOW KNEE;  Exploration of Left Leg Below Knee Amputation Stump with Ligation of Bleeders.;  Surgeon: Leandro Arreola MD;  Location: [facility]     APPENDECTOMY       APPLY WOUND VAC Left 12/6/2017    Procedure:  APPLY WOUND VAC;;  Surgeon: Saima Howard MD;  Location:  [facility]     ARTHROPLASTY HIP Right 11/27/2018    Procedure: RIGHT TOTAL HIP ARTHROPLASTY;  Surgeon: Saima Howard MD;  Location:  OR     ARTHROPLASTY KNEE Right 9/19/2014    Procedure: ARTHROPLASTY KNEE;  Surgeon: Saima Howard MD;  Location:  OR     CARDIAC SURGERY      CABG     CHOLECYSTECTOMY       COLONOSCOPY       COMBINED CYSTOSCOPY, RETROGRADES, EXCHANGE STENT URETER(S) Left 7/24/2018    Procedure: COMBINED CYSTOSCOPY, RETROGRADES, EXCHANGE STENT URETER(S);  CYSTOSCOPY, BILATERAL RETROGRADES, BILATERAL URETERAL STENT EXCHANGE ;  Surgeon: Matt Cervantes MD;  Location:  OR     CRANIOTOMY Right 4/7/2018    Procedure: CRANIOTOMY;  Right Craniotomy For Subdural Hematoma;  Surgeon: Jono Issa MD;  Location:  OR     CYSTOSCOPY, DILATE URETHRA, COMBINED N/A 10/12/2016    Procedure: COMBINED CYSTOSCOPY, DILATE URETHRA;  Surgeon: Dimitry Bello MD;  Location:  OR     CYSTOSCOPY, REMOVE STENT(S), COMBINED Right 7/24/2018    Procedure: COMBINED CYSTOSCOPY, REMOVE STENT(S);;  Surgeon: Jose Hunter MD;  Location:  OR     ENDOSCOPIC STRIPPING VEIN(S)       esophagogastroduodenoscopy       ESOPHAGOSCOPY, GASTROSCOPY, DUODENOSCOPY (EGD), COMBINED N/A 3/11/2019    Procedure: COMBINED ESOPHAGOSCOPY, GASTROSCOPY, DUODENOSCOPY (EGD), BIOPSY SINGLE OR MULTIPLE;  Surgeon: Katherin Boyd MD;  Location:  GI     ESOPHAGOSCOPY, GASTROSCOPY, DUODENOSCOPY (EGD), COMBINED N/A 9/27/2019    Procedure: ESOPHAGOGASTRODUODENOSCOPY, WITH FOREIGN BODY REMOVAL;  Surgeon: Raegan Mckeon MD;  Location:  GI     EYE SURGERY      cataracts     GENITOURINARY SURGERY      Prostate Seen Implants     HERNIA REPAIR      Umbilical     INCISION AND DRAINAGE LOWER EXTREMITY, COMBINED Left 12/1/2017    Procedure: COMBINED INCISION AND DRAINAGE LOWER EXTREMITY;  INCISION AND DRAINAGE OF LEFT BELOW KNEE AMPUTATION ABSCESS,  WOUND VAC PLACEMENT;  Surgeon: Saima Howard MD;  Location:  OR     IR IVC FILTER PLACEMENT       IRRIGATION AND DEBRIDEMENT LOWER EXTREMITY, COMBINED Left 2017    Procedure: COMBINED IRRIGATION AND DEBRIDEMENT LOWER EXTREMITY;  IRRIGATION AND DEBRIDEMENT OF LEFT BELOW KNEE AMPUTATION SITE WITH WOUND VAC REPLACEMENT;  Surgeon: Saima Howard MD;  Location:  [facility]     IRRIGATION AND DEBRIDEMENT LOWER EXTREMITY, COMBINED Left 2017    Procedure: COMBINED IRRIGATION AND DEBRIDEMENT LOWER EXTREMITY;  IRRIGATION AND DEBRIDEMENT OF LEFT BELOW THE KNEE AMPUTATION AND SHORTENING OF THE TIBIA WITH WOUND CLOSURE;  Surgeon: Saima Howard MD;  Location:  OR     LASER HOLMIUM LITHOTRIPSY URETER(S), INSERT STENT, COMBINED Bilateral 2018    Procedure: COMBINED CYSTOSCOPY, URETEROSCOPY, LASER HOLMIUM LITHOTRIPSY URETER(S), INSERT STENT;  CYSTOSCOPY, BILATERAL URETEROSCOPY,  HOLMIUM LASER LITHOTRIPSY, BILATERAL STENT PLACEMENT, STONE BASKETING;  Surgeon: Jose Hunter MD;  Location:  OR     LASER HOLMIUM LITHOTRIPSY URETER(S), INSERT STENT, COMBINED Left 2018    Procedure: COMBINED CYSTOSCOPY, URETEROSCOPY, LASER HOLMIUM LITHOTRIPSY URETER(S), INSERT STENT;  CYSTOSCOPY, LEFT URETEROSCOPY, LEFT LASER HOLMIUM LITHOTRIPSY URETER(S) REMOVAL BILATERAL STENTS;  Surgeon: Jose Hunter MD;  Location:  OR     ORTHOPEDIC SURGERY      (R) knee scope     ORTHOPEDIC SURGERY      total hip      ORTHOPEDIC SURGERY      elbow surgery       FAMILY HISTORY:  Family History   Problem Relation Age of Onset     Lung Cancer Mother      Heart Failure Father          age 87       SOCIAL HISTORY:  Social History     Socioeconomic History     Marital status:      Spouse name: Not on file     Number of children: 2     Years of education: Not on file     Highest education level: Not on file   Occupational History     Occupation: Retired    Social Needs     Financial resource strain: Not  on file     Food insecurity:     Worry: Not on file     Inability: Not on file     Transportation needs:     Medical: Not on file     Non-medical: Not on file   Tobacco Use     Smoking status: Never Smoker     Smokeless tobacco: Never Used   Substance and Sexual Activity     Alcohol use: Yes     Comment: quit 10/2015; now abt 1-2 vodka's per night     Drug use: No     Sexual activity: Not Currently     Partners: Female   Lifestyle     Physical activity:     Days per week: Not on file     Minutes per session: Not on file     Stress: Not on file   Relationships     Social connections:     Talks on phone: Not on file     Gets together: Not on file     Attends Hinduism service: Not on file     Active member of club or organization: Not on file     Attends meetings of clubs or organizations: Not on file     Relationship status: Not on file     Intimate partner violence:     Fear of current or ex partner: Not on file     Emotionally abused: Not on file     Physically abused: Not on file     Forced sexual activity: Not on file   Other Topics Concern     Parent/sibling w/ CABG, MI or angioplasty before 65F 55M? Not Asked   Social History Narrative    , 2 children, retired . He does not have a living will but desires full code.  (last updated 2/28/2019)        Review of Systems:  10 point review of systems negative except as listed in HPI    Recent Lab Results:  LIPID RESULTS:  No results found for: CHOL, HDL, LDL, TRIG, CHOLHDLRATIO    LIVER ENZYME RESULTS:  Lab Results   Component Value Date    AST 27 09/27/2019    ALT 10 09/27/2019       CBC RESULTS:  Lab Results   Component Value Date    WBC 4.2 09/27/2019    RBC 3.43 (L) 09/27/2019    HGB 9.7 (L) 09/27/2019    HCT 30.5 (L) 09/27/2019    MCV 89 09/27/2019    MCH 28.3 09/27/2019    MCHC 31.8 09/27/2019    RDW 16.6 (H) 09/27/2019     12/30/2019       BMP RESULTS:  Lab Results   Component Value Date     01/08/2020    POTASSIUM 4.3 01/08/2020     CHLORIDE 100 01/06/2020    CO2 27 01/06/2020    ANIONGAP 7.2 01/06/2020     (H) 01/06/2020    BUN 35 (H) 01/08/2020    CR 1.81 (H) 01/08/2020    GFRESTIMATED 35 (L) 01/08/2020    GFRESTBLACK 41 (L) 01/08/2020    BENNIE 8.5 01/06/2020        A1C RESULTS:  Lab Results   Component Value Date    A1C 4.6 05/18/2018       INR RESULTS:  Lab Results   Component Value Date    INR 2.05 (H) 12/30/2019    INR 2.01 (H) 11/18/2019         CC  No referring provider defined for this encounter.

## 2020-01-09 NOTE — PROGRESS NOTES
Called patient to follow up post infusion call. Patient was a little confused on the purpose of infusion due to not seeing a weight drop day of the infusion. Patient states that his weight yesterday was 252# and today was 242#. I explained that sometimes it takes a day or two to see the effects, but a 10# weight drop within a day is significant. Patient states that these weights are inaccurate due to weighing with different clothing on. When asking him how he felt today than yesterday he states that he feels the same. He doesn't feel worse or better even with the weight drop. He states that he doesn't have swelling today. He has no increased issues with SOB, but he does have SOB with exertion. He slept well overnight. I instructed patient to continue weighing himself daily to monitor his weights and how he feels and to call if he experiences weight gain/increased symptoms. Patient confirmed understanding of this.     Future Appointments   Date Time Provider Department Center   1/13/2020  2:00 PM [facility]   1/20/2020 12:30 PM Juli Smith PA-C [facility]   1/27/2020  2:00 PM [facility]   2/10/2020  2:00 PM [facility]   2/24/2020  2:00 PM [facility]   3/9/2020  2:00 PM [facility]   3/23/2020  2:00 PM [facility]   4/6/2020  2:00 PM [facility]   4/20/2020  2:00 PM [facility]   5/4/2020  2:00 PM [facility]   5/18/2020  2:00 PM [facility]   6/1/2020  2:00 PM [facility]   6/15/2020  2:00 PM [facility]   6/29/2020  2:00 PM [facility]   7/13/2020  2:00 PM [facility]   7/27/2020  2:00 PM [facility]   8/10/2020  2:00 PM [facility]   8/24/2020  2:00 PM GOS[facility] PEREZ   9/8/2020  2:00 PM GOSIA Lopez RN January 9, 2020 11:30 AM

## 2020-01-10 ENCOUNTER — TELEPHONE (OUTPATIENT)
Dept: MEDSURG UNIT | Facility: CLINIC | Age: 75
End: 2020-01-10

## 2020-01-13 ENCOUNTER — HOSPITAL ENCOUNTER (OUTPATIENT)
Dept: ULTRASOUND IMAGING | Facility: CLINIC | Age: 75
End: 2020-01-13
Attending: INTERNAL MEDICINE
Payer: MEDICARE

## 2020-01-13 ENCOUNTER — HOSPITAL ENCOUNTER (OUTPATIENT)
Facility: CLINIC | Age: 75
Discharge: HOME | End: 2020-01-13
Admitting: PHYSICIAN ASSISTANT
Payer: MEDICARE

## 2020-01-13 VITALS
RESPIRATION RATE: 18 BRPM | DIASTOLIC BLOOD PRESSURE: 46 MMHG | HEIGHT: 74 IN | WEIGHT: 249 LBS | HEART RATE: 84 BPM | TEMPERATURE: 97.6 F | OXYGEN SATURATION: 95 % | SYSTOLIC BLOOD PRESSURE: 110 MMHG | BODY MASS INDEX: 31.95 KG/M2

## 2020-01-13 DIAGNOSIS — K70.31 ALCOHOLIC CIRRHOSIS OF LIVER WITH ASCITES (H): ICD-10-CM

## 2020-01-13 PROCEDURE — 40000863 ZZH STATISTIC RADIOLOGY XRAY, US, CT, MAR, NM

## 2020-01-13 PROCEDURE — 25000125 ZZHC RX 250: Performed by: INTERNAL MEDICINE

## 2020-01-13 PROCEDURE — 27210190 US PARACENTESIS

## 2020-01-13 RX ORDER — LIDOCAINE HYDROCHLORIDE 10 MG/ML
10 INJECTION, SOLUTION EPIDURAL; INFILTRATION; INTRACAUDAL; PERINEURAL ONCE
Status: COMPLETED | OUTPATIENT
Start: 2020-01-13 | End: 2020-01-13

## 2020-01-13 RX ADMIN — LIDOCAINE HYDROCHLORIDE 10 ML: 10 INJECTION, SOLUTION EPIDURAL; INFILTRATION; INTRACAUDAL; PERINEURAL at 14:24

## 2020-01-13 ASSESSMENT — MIFFLIN-ST. JEOR: SCORE: 1929.21

## 2020-01-13 NOTE — PROCEDURES
[facility]    Procedure: Paracentesis  Date/Time: 1/13/2020 2:42 PM  Performed by: Ran Giraldo PA-C  Authorized by: Ran Giraldo PA-C   IR Fellow Physician: Enzo    UNIVERSAL PROTOCOL   Site Marked: Yes  Prior Images Obtained and Reviewed:  Yes  Required items: Required blood products, implants, devices and special equipment available    Patient identity confirmed:  Verbally with patient  Patient was reevaluated immediately before administering moderate or deep sedation or anesthesia  Confirmation Checklist:  Patient's identity using two indicators, relevant allergies, procedure was appropriate and matched the consent or emergent situation and correct equipment/implants were available  Time out: Immediately prior to the procedure a time out was called    Universal Protocol: the Joint Commission Universal Protocol was followed    Preparation: Patient was prepped and draped in usual sterile fashion    ESBL (mL):  0         ANESTHESIA    Local Anesthetic: Lidocaine 1% without epinephrine      SEDATION    Patient Sedated: No    Vital signs: Vital signs monitored during sedation    See dictated procedure note for full details.  PROCEDURE   Patient Tolerance:  Patient tolerated the procedure well with no immediate complications  Describe Procedure: Paracentesis with yellow fluid  Length of time physician/provider present for 1:1 monitoring during sedation: 15

## 2020-01-13 NOTE — DISCHARGE INSTRUCTIONS

## 2020-01-13 NOTE — PROGRESS NOTES
[facility] Admission Nursing Note    Reason for admission:  Paracentesis  CS arrival time:  1245  Accompanied by:  Alone, but Omaira/ is coming later--patient will call  Name/phone of DC :   Medications held:  Last xarelto was 1/11/2020, last aspirin was yesterday  Consent signed:  Provider to obtain in procedure  Abnormal assessment/labs:  12/20/19 Plt 153 and INR 2.05  If abnormal, provider notified:  N/A.  These values are WDL.  Education/questions answered:    Plan: proceed with paracentesis.  Will start IV if needed for albumin administration.  ______________________________    [facility] Procedure Nursing Note    Procedure:  Paracentesis  Procedure started time:  1428  Procedure completed time:  1456  6.4 liters aspirated.    Concerns/abnormal assessment:  No/none  Plan:  Return to [facility].  __________________________    [facility] Post-Procedure Note    Procedure:  paracentesis  CS arrival time:  1500  Accompanied by:  myself  Concerns/abnormal assessment after procedure:  No/none  Plan:  Recovery in [facility]  __________________________    [facility] Discharge Nursing Note    Education/questions answered: Yes  Patient DC location:  Per W/C, to home with ride from wife.  Waiting independently.    CS discharge time:  1520

## 2020-01-15 ENCOUNTER — DOCUMENTATION ONLY (OUTPATIENT)
Dept: CARDIOLOGY | Facility: CLINIC | Age: 75
End: 2020-01-15

## 2020-01-15 DIAGNOSIS — I50.22 CHRONIC SYSTOLIC CONGESTIVE HEART FAILURE (H): Primary | ICD-10-CM

## 2020-01-15 NOTE — PROGRESS NOTES
[facility]      Pt seen for initial CORE visit 1/6/2020 and deemed to have fluid overload. He was sent for IV lasix infusion, and reported a 10# wt loss afterward.     Orders were placed for repeat BMP and pro BNP to be drawn at time of 1/13/2020 paracentesis. Unfortunately, these were not drawn.    Called pt to get updated wt/sx assessment, and request that he have labs drawn prior to upcoming CORE follow-up 1/20/2020. No answer, left non detailed VM requesting call back.    Iza Guerrero RN BSN   9:19 AM 01/15/20

## 2020-01-15 NOTE — PROGRESS NOTES
pls have him increase torsemide to 40 mg daily.  And a kind reminder that I can't make well educated recommendations without home wts. Juli Smith PA-C 1/15/2020 5:59 PM

## 2020-01-15 NOTE — PROGRESS NOTES
[facility]  Patient/wife returning call to assess weights/sx and schedule lab draw for upcoming appt on 1/20    Recent weights:    1/15 ----   1/14 ----    1/13 ----   1/12 249.6   1/11 ----   1/10 248   1/9 242   1/8 Infusion clinic  I spoke w/patients wife about recent weights, in particular that patient reported a 10# weight loss the day after the infusion clinic. His wife was not able to find weights on those days recorded. She concluded that the reported weight of 232# was incorrect. More likely patient has been consistently in the 240's and increasing as above. Patient told his wife that he urinated little when at the infusion clinic and not a significant increase the next day  Current diuretic:   spironolactone 25mg/d   torsemide 20mg/d  Patients wife stated that patient is very consistent w/taking his meds and does not miss doses    PRN diuretic plan:   Infusion clinic - last done on 1/8/2020    SOB: Patients wife feels breathing unchanged. She observes that he gets SOB w/most activity. He is essentially sedentary. At the most he walks to the BR/kitchen. The greatest distance he walks is when going to the car. He gets more SOB the closer to the paracentesis day he gets.     She also stated that he typically sleeps on 2 pillows, but the last 2 weeks he has been sleeping in the recliner to have his head higher  BLE Edema/ Abdominal Edema: Patients wife stated that his legs look no different    Assessment and Plan:    The 10# weight loss the patient reported likely incorrect. Patients wife will weigh patient tomorrow am(1/16) and call w/this weight. She will try to get this done every am. She recommended we call her directly for patient assessment as patient underreports, gets frustrated w/questions.     Lab scheduled as below.     Will update Juli More w/today's assessment.  Juliette Cannon RN on 1/15/2020 at 3:53 PM    Future Appointments   Date Time Provider Department Center    1/20/2020 11:30 AM [facility]   1/20/2020 12:30 PM Juli Smith PA-C [facility]   1/27/2020  2:00 PM [facility]   2/10/2020  2:00 PM [facility]   2/24/2020  2:00 PM [facility]   3/9/2020  2:00 PM [facility]   3/23/2020  2:00 PM [facility]   4/6/2020  2:00 PM [facility]   4/20/2020  2:00 PM [facility]   5/4/2020  2:00 PM [facility]   5/18/2020  2:00 PM [facility]   6/1/2020  2:00 PM [facility]   6/15/2020  2:00 PM [facility]   6/29/2020  2:00 PM [facility]   7/13/2020  2:00 PM [facility]   7/27/2020  2:00 PM [facility]   8/10/2020  2:00 PM [facility]   8/24/2020  2:00 PM [facility]   9/8/2020  2:00 PM [facility]

## 2020-01-16 RX ORDER — TORSEMIDE 20 MG/1
TABLET ORAL
Qty: 180 TABLET | Refills: 3 | Status: SHIPPED | OUTPATIENT
Start: 2020-01-16 | End: 2020-02-04

## 2020-01-16 NOTE — PROGRESS NOTES
Called patient, with his permission I spoke to his wife. I relayed to her change in torsemide dose to 40mg/d. This will be started today. Med list updated.   Weight today:   1/16 237.7    As this seems out of line w/his previous weights, I asked them about the reliability of their scale. Wife stated that the 1st weight they got this am was 227#, and then as above on the 2nd try. We reviewed ways to insure accurate weights. Patients wife verbalized understanding. She will help patient daily and we will watch trends. Confirmed next appt w/Juli More on 1/20.  Juliette Cannon, RN on 1/16/2020 at 9:26 AM

## 2020-01-17 RX ORDER — DEXTROSE MONOHYDRATE 25 G/50ML
25-50 INJECTION, SOLUTION INTRAVENOUS
Status: CANCELLED | OUTPATIENT
Start: 2020-01-17

## 2020-01-17 RX ORDER — NICOTINE POLACRILEX 4 MG
15-30 LOZENGE BUCCAL
Status: CANCELLED | OUTPATIENT
Start: 2020-01-17

## 2020-01-17 RX ORDER — LIDOCAINE 40 MG/G
CREAM TOPICAL
Status: CANCELLED | OUTPATIENT
Start: 2020-01-17

## 2020-01-22 ENCOUNTER — TELEPHONE (OUTPATIENT)
Dept: MEDSURG UNIT | Facility: CLINIC | Age: 75
End: 2020-01-22

## 2020-01-27 ENCOUNTER — DOCUMENTATION ONLY (OUTPATIENT)
Dept: CARDIOLOGY | Facility: CLINIC | Age: 75
End: 2020-01-27

## 2020-01-27 ENCOUNTER — HOSPITAL ENCOUNTER (OUTPATIENT)
Facility: CLINIC | Age: 75
Discharge: HOME | End: 2020-01-27
Admitting: INTERNAL MEDICINE
Payer: MEDICARE

## 2020-01-27 ENCOUNTER — HOSPITAL ENCOUNTER (OUTPATIENT)
Dept: ULTRASOUND IMAGING | Facility: CLINIC | Age: 75
End: 2020-01-27
Attending: INTERNAL MEDICINE
Payer: MEDICARE

## 2020-01-27 VITALS
TEMPERATURE: 97.7 F | OXYGEN SATURATION: 98 % | RESPIRATION RATE: 18 BRPM | DIASTOLIC BLOOD PRESSURE: 49 MMHG | SYSTOLIC BLOOD PRESSURE: 110 MMHG

## 2020-01-27 DIAGNOSIS — K70.31 ALCOHOLIC CIRRHOSIS OF LIVER WITH ASCITES (H): ICD-10-CM

## 2020-01-27 DIAGNOSIS — I50.22 CHRONIC SYSTOLIC CONGESTIVE HEART FAILURE (H): ICD-10-CM

## 2020-01-27 LAB
ANION GAP SERPL CALCULATED.3IONS-SCNC: 4 MMOL/L (ref 3–14)
BUN SERPL-MCNC: 32 MG/DL (ref 7–30)
CALCIUM SERPL-MCNC: 8.6 MG/DL (ref 8.5–10.1)
CHLORIDE SERPL-SCNC: 102 MMOL/L (ref 94–109)
CO2 SERPL-SCNC: 30 MMOL/L (ref 20–32)
CREAT SERPL-MCNC: 1.65 MG/DL (ref 0.66–1.25)
GFR SERPL CREATININE-BSD FRML MDRD: 40 ML/MIN/{1.73_M2}
GLUCOSE SERPL-MCNC: 96 MG/DL (ref 70–99)
NT-PROBNP SERPL-MCNC: 1551 PG/ML (ref 0–450)
POTASSIUM SERPL-SCNC: 3.8 MMOL/L (ref 3.4–5.3)
SODIUM SERPL-SCNC: 136 MMOL/L (ref 133–144)

## 2020-01-27 PROCEDURE — 80048 BASIC METABOLIC PNL TOTAL CA: CPT | Performed by: PHYSICIAN ASSISTANT

## 2020-01-27 PROCEDURE — 40000863 ZZH STATISTIC RADIOLOGY XRAY, US, CT, MAR, NM

## 2020-01-27 PROCEDURE — 36415 COLL VENOUS BLD VENIPUNCTURE: CPT | Performed by: PHYSICIAN ASSISTANT

## 2020-01-27 PROCEDURE — 25000125 ZZHC RX 250: Performed by: RADIOLOGY

## 2020-01-27 PROCEDURE — 83880 ASSAY OF NATRIURETIC PEPTIDE: CPT | Performed by: PHYSICIAN ASSISTANT

## 2020-01-27 PROCEDURE — 27210190 US PARACENTESIS

## 2020-01-27 RX ORDER — DEXTROSE MONOHYDRATE 25 G/50ML
25-50 INJECTION, SOLUTION INTRAVENOUS
Status: DISCONTINUED | OUTPATIENT
Start: 2020-01-27 | End: 2020-01-27 | Stop reason: HOSPADM

## 2020-01-27 RX ORDER — LIDOCAINE 40 MG/G
CREAM TOPICAL
Status: DISCONTINUED | OUTPATIENT
Start: 2020-01-27 | End: 2020-01-27 | Stop reason: HOSPADM

## 2020-01-27 RX ORDER — ALBUMIN (HUMAN) 12.5 G/50ML
12.5 SOLUTION INTRAVENOUS ONCE
Status: DISCONTINUED | OUTPATIENT
Start: 2020-01-27 | End: 2020-01-27 | Stop reason: HOSPADM

## 2020-01-27 RX ORDER — LIDOCAINE HYDROCHLORIDE 10 MG/ML
10 INJECTION, SOLUTION EPIDURAL; INFILTRATION; INTRACAUDAL; PERINEURAL ONCE
Status: COMPLETED | OUTPATIENT
Start: 2020-01-27 | End: 2020-01-27

## 2020-01-27 RX ORDER — NICOTINE POLACRILEX 4 MG
15-30 LOZENGE BUCCAL
Status: DISCONTINUED | OUTPATIENT
Start: 2020-01-27 | End: 2020-01-27 | Stop reason: HOSPADM

## 2020-01-27 RX ADMIN — LIDOCAINE HYDROCHLORIDE 10 ML: 10 INJECTION, SOLUTION EPIDURAL; INFILTRATION; INTRACAUDAL; PERINEURAL at 13:56

## 2020-01-27 NOTE — DISCHARGE INSTRUCTIONS

## 2020-01-27 NOTE — PROGRESS NOTES
Paracentesis:     Pt tolerated well. VSS. 5,050 cc dark pink tinged fluid removed from abdomen w/o difficulty. Bandaid applied to site- CDI.    Pt back to [facility]. Tolerating fluids.      Discharged per wheelchair per private vehicle with wife.

## 2020-01-27 NOTE — PROCEDURES
[facility]    Procedure: Paracentesis  Date/Time: 1/27/2020 2:10 PM  Performed by: Ran Giraldo PA-C  Authorized by: Ran Giraldo PA-C   IR Fellow Physician: ALEXA Giraldo    UNIVERSAL PROTOCOL   Site Marked: Yes  Prior Images Obtained and Reviewed:  Yes  Required items: Required blood products, implants, devices and special equipment available    Patient identity confirmed:  Verbally with patient  Patient was reevaluated immediately before administering moderate or deep sedation or anesthesia  Confirmation Checklist:  Patient's identity using two indicators, relevant allergies, procedure was appropriate and matched the consent or emergent situation and correct equipment/implants were available  Time out: Immediately prior to the procedure a time out was called    Universal Protocol: the Joint Commission Universal Protocol was followed    Preparation: Patient was prepped and draped in usual sterile fashion           ANESTHESIA    Local Anesthetic: Lidocaine 1% without epinephrine      SEDATION    Patient Sedated: No    See dictated procedure note for full details.  PROCEDURE   Patient Tolerance:  Patient tolerated the procedure well with no immediate complications  Describe Procedure: Paracentesis  Length of time physician/provider present for 1:1 monitoring during sedation: 0

## 2020-01-27 NOTE — PROGRESS NOTES
[facility]    Received VM from pt's wife noting it was her 2nd attempt to reschedule 1/20/2020 CORE visit that was cancelled d/t provider needing to reschedule.     Held a spot on Juli SORTO's CORE schedule for 1/28/2020 and msg'd scheduling to call pt's wife to arrange.    Iza Guerrero RN BSN   11:04 AM 01/27/20

## 2020-01-28 ENCOUNTER — OFFICE VISIT (OUTPATIENT)
Dept: CARDIOLOGY | Facility: CLINIC | Age: 75
End: 2020-01-28
Payer: MEDICARE

## 2020-01-28 VITALS
OXYGEN SATURATION: 97 % | HEIGHT: 74 IN | WEIGHT: 240.5 LBS | HEART RATE: 63 BPM | DIASTOLIC BLOOD PRESSURE: 54 MMHG | BODY MASS INDEX: 30.87 KG/M2 | SYSTOLIC BLOOD PRESSURE: 116 MMHG

## 2020-01-28 DIAGNOSIS — I50.20 HEART FAILURE WITH REDUCED EJECTION FRACTION, NYHA CLASS III (H): ICD-10-CM

## 2020-01-28 PROCEDURE — 99215 OFFICE O/P EST HI 40 MIN: CPT | Performed by: PHYSICIAN ASSISTANT

## 2020-01-28 RX ORDER — SPIRONOLACTONE 50 MG/1
50 TABLET, FILM COATED ORAL DAILY
Qty: 90 TABLET | Refills: 3 | Status: ON HOLD | OUTPATIENT
Start: 2020-01-28 | End: 2020-01-01

## 2020-01-28 RX ORDER — LIDOCAINE 40 MG/G
CREAM TOPICAL
Status: CANCELLED | OUTPATIENT
Start: 2020-01-28

## 2020-01-28 RX ORDER — NICOTINE POLACRILEX 4 MG
15-30 LOZENGE BUCCAL
Status: CANCELLED | OUTPATIENT
Start: 2020-01-28

## 2020-01-28 RX ORDER — DEXTROSE MONOHYDRATE 25 G/50ML
25-50 INJECTION, SOLUTION INTRAVENOUS
Status: CANCELLED | OUTPATIENT
Start: 2020-01-28

## 2020-01-28 RX ORDER — ALBUMIN (HUMAN) 12.5 G/50ML
12.5 SOLUTION INTRAVENOUS ONCE
Status: CANCELLED | OUTPATIENT
Start: 2020-01-28 | End: 2020-01-28

## 2020-01-28 ASSESSMENT — MIFFLIN-ST. JEOR: SCORE: 1890.65

## 2020-01-28 NOTE — PROGRESS NOTES
Service Date: 2020      PRIMARY CARDIOLOGIST:  Dr. Downs.      REASON FOR VISIT:  C.O.R.E. Clinic enrollment.      HISTORY OF PRESENT ILLNESS:  Mr. Ramirez is a complex 74-year-old gentleman seen today at the request of Dr. Ovalle for C.O.R.E. Clinic enrollment to see if we can prevent hospitalizations.  His past medical history is significant for the followin.  Alcoholic cirrhosis of the liver with scheduled standing paracentesis every 2 weeks, with the last several weeks taking off about 7 liters each.   2.  Alcohol dependency with ongoing alcohol intake in spite of recommendations otherwise.   3.  Coronary artery disease with history of MI and CAB.   4.  Ischemic cardiomyopathy with EF 40%-45% and chronic systolic heart failure.   5.  Severe pulmonary hypertension with elevated pulmonary pressures, likely multifactorial due to liver failure, hypoproteinemia and heart failure, with the patient declining further PH workup by Dr. Ovalle.   6.  Persistent AFib, on anticoagulation.   7.  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   8.  History of large-cell B-cell non-Hodgkin's lymphoma.   9.  CKD.   10.  Chronic anemia and thrombocytopenia.   11.  History of PEA and possibly VT arrest in 2019.   12.  History of PE and antiphospholipid antibody syndrome with history of IVC filter placement, on chronic anticoagulation.   13.  Mild to moderate mitral regurg, mild mitral stenosis, mild tricuspid regurgitation.       I met the patient about 2 weeks ago when he had been referred to Dr. Ovalle for referral for cord involvement.  At that point is very clear that he was hypervolemic but his breathing was okay with the exception of when he needed a paracentesis.  Once that is complete his breathing intended to improve.  At my visit we set him up for an infusion clinic.  He even with IV diuretics he did not really diurese.  At that point we  increased his torsemide to 40 mg daily.  He remained on 25 of spironolactone daily.    Since that time he feels about the same.  In reviewing his weights though, his weight has dropped from between about 245 to 250 pounds down to 235 to 240 pounds and now is sitting about 233.  He has had 2 paracenteses since I saw him and the volume was around 6 L each which is lower than it had been which was about 7 L.  He continues to deny orthopnea or PND and he can lie flat in bed.  He has not had chest pain.  He is short of breath walking in to clinic or doing fairly heavy work but he primarily is sedentary at home.  He sits in his chair all day and actually uses a urinal.  He only walks from the bedroom to the family room.  His wife brings him soda and things like that.  He no longer has wounds on his stump those are improving and healing and nearly resolved.  He also notes that his stump edema is also resolved.       SOCIAL HISTORY:  He comes in today with his wife, Helena.  They have started working on lowering his sodium and increasing the protein in his diet.  He still uses a saltshaker although rarely.  At least, he is starting to think in that direction.  We did not discuss alcohol today.  He is a lifelong nonsmoker.  He relays to me today that his son went to [facility].     PHYSICAL EXAMINATION:   GENERAL:  Exam reveals an elderly, frail, irritable gentleman in no acute distress.    HEENT:  He is normocephalic and atraumatic.   HEART:  Irregularly irregular with a 3/6 systolic murmur heard best at the left sternal border.   LUNGS:  Markedly diminished bilaterally, although I do not appreciate wheezes or rales.   EXTREMITIES:  He has 2 to 3+ pitting edema in his right leg up into his thigh.  Stump has 2+ pitting edema up into his thigh.   ABDOMEN:  Distended consistent with ascites.   I do not appreciate JVP although it is difficult to assess secondary body habitus.       ASSESSMENT AND PLAN:  This is a complex  74-year-old gentleman with alcoholic cirrhosis with biweekly paracenteses, alcohol dependence, low protein levels, history of coronary artery disease and mild cardiomyopathy, pulmonary hypertension likely secondary to all of the above, CKD, chronic thrombocytopenia and anemia who I recently met in the core clinic.  He has had a gradual improvement in weights and is down 10 pounds from the last time I saw him.  Overall his weight range has dropped from 245 pounds to 250 pounds down to the 230 to 235 pounds.  I am hopeful we can keep this slow gradual progress going as he clearly has severe edema.  He also has fairly diminished lungs and I suspect if he was more active he be more short of breath.  We discussed today that this is going to take multiple things to improve.  I want him to watch his salt, I want him to elevate his legs above his heart if possible wheeze 45 minutes a day, I want his compression devices on if possible.  In addition, he should increase his protein and increase his activity.  I do not want him getting drinks delivered to him I want him walking to the kitchen to get his own Pap etc.  Today, he has a much more positive attitude about making change and will continue on this path.  I will increase his spironolactone to 50 mg a day, he will need a BMP in 1 to 2 weeks.  I will have my nurse call next week and if his weight is not continuing to trend down we will increase his torsemide to 60 mg daily.  I think he has somewhere along the lines of 30 to 40 pounds of volume left on him.    Thank you for allowing me to precipitate in this patient's care will continue to work on optimizing him and I am hopeful will make some progress, more so than I was after our last visit.    Thank you for allowing me to participate in this patient's care.      More than 50 minutes was spent in counseling and coordination of care, including more than 50% in counseling and coordination of care, including review of  complex past medical history, discussion of lifestyle changes and options going forward.     Juli Smith PA-C

## 2020-01-28 NOTE — LETTER
2020    Main Hardy MD  [address]  [place] [address]    RE: Antonio KU Ashley       Dear Colleague,    I had the pleasure of seeing Antonio Ramirez in the [facility].    Service Date: 2020      PRIMARY CARDIOLOGIST:  Dr. Downs.      REASON FOR VISIT:  C.O.R.E. Clinic enrollment.      HISTORY OF PRESENT ILLNESS:  Mr. Ramirez is a complex 74-year-old gentleman seen today at the request of Dr. Ovalle for C.O.R.E. Clinic enrollment to see if we can prevent hospitalizations.  His past medical history is significant for the followin.  Alcoholic cirrhosis of the liver with scheduled standing paracentesis every 2 weeks, with the last several weeks taking off about 7 liters each.   2.  Alcohol dependency with ongoing alcohol intake in spite of recommendations otherwise.   3.  Coronary artery disease with history of MI and CAB.   4.  Ischemic cardiomyopathy with EF 40%-45% and chronic systolic heart failure.   5.  Severe pulmonary hypertension with elevated pulmonary pressures, likely multifactorial due to liver failure, hypoproteinemia and heart failure, with the patient declining further PH workup by Dr. Ovalle.   6.  Persistent AFib, on anticoagulation.   7.  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   8.  History of large-cell B-cell non-Hodgkin's lymphoma.   9.  CKD.   10.  Chronic anemia and thrombocytopenia.   11.  History of PEA and possibly VT arrest in 2019.   12.  History of PE and antiphospholipid antibody syndrome with history of IVC filter placement, on chronic anticoagulation.   13.  Mild to moderate mitral regurg, mild mitral stenosis, mild tricuspid regurgitation.       I met the patient about 2 weeks ago when he had been referred to Dr. Ovalle for referral for cord involvement.  At that point is very clear that he was hypervolemic but his breathing was okay with the exception  of when he needed a paracentesis.  Once that is complete his breathing intended to improve.  At my visit we set him up for an infusion clinic.  He even with IV diuretics he did not really diurese.  At that point we increased his torsemide to 40 mg daily.  He remained on 25 of spironolactone daily.    Since that time he feels about the same.  In reviewing his weights though, his weight has dropped from between about 245 to 250 pounds down to 235 to 240 pounds and now is sitting about 233.  He has had 2 paracenteses since I saw him and the volume was around 6 L each which is lower than it had been which was about 7 L.  He continues to deny orthopnea or PND and he can lie flat in bed.  He has not had chest pain.  He is short of breath walking in to clinic or doing fairly heavy work but he primarily is sedentary at home.  He sits in his chair all day and actually uses a urinal.  He only walks from the bedroom to the family room.  His wife brings him soda and things like that.  He no longer has wounds on his stump those are improving and healing and nearly resolved.  He also notes that his stump edema is also resolved.       SOCIAL HISTORY:  He comes in today with his wife, Helena.  They have started working on lowering his sodium and increasing the protein in his diet.  He still uses a saltshaker although rarely.  At least, he is starting to think in that direction.  We did not discuss alcohol today.  He is a lifelong nonsmoker.  He relays to me today that his son went to [facility].     PHYSICAL EXAMINATION:   GENERAL:  Exam reveals an elderly, frail, irritable gentleman in no acute distress.    HEENT:  He is normocephalic and atraumatic.   HEART:  Irregularly irregular with a 3/6 systolic murmur heard best at the left sternal border.   LUNGS:  Markedly diminished bilaterally, although I do not appreciate wheezes or rales.   EXTREMITIES:  He has 2 to 3+ pitting edema in his right leg up into his thigh.  Stump has 2+  pitting edema up into his thigh.   ABDOMEN:  Distended consistent with ascites.   I do not appreciate JVP although it is difficult to assess secondary body habitus.       ASSESSMENT AND PLAN:  This is a complex 74-year-old gentleman with alcoholic cirrhosis with biweekly paracenteses, alcohol dependence, low protein levels, history of coronary artery disease and mild cardiomyopathy, pulmonary hypertension likely secondary to all of the above, CKD, chronic thrombocytopenia and anemia who I recently met in the core clinic.  He has had a gradual improvement in weights and is down 10 pounds from the last time I saw him.  Overall his weight range has dropped from 245 pounds to 250 pounds down to the 230 to 235 pounds.  I am hopeful we can keep this slow gradual progress going as he clearly has severe edema.  He also has fairly diminished lungs and I suspect if he was more active he be more short of breath.  We discussed today that this is going to take multiple things to improve.  I want him to watch his salt, I want him to elevate his legs above his heart if possible wheeze 45 minutes a day, I want his compression devices on if possible.  In addition, he should increase his protein and increase his activity.  I do not want him getting drinks delivered to him I want him walking to the kitchen to get his own Pap etc.  Today, he has a much more positive attitude about making change and will continue on this path.  I will increase his spironolactone to 50 mg a day, he will need a BMP in 1 to 2 weeks.  I will have my nurse call next week and if his weight is not continuing to trend down we will increase his torsemide to 60 mg daily.  I think he has somewhere along the lines of 30 to 40 pounds of volume left on him.    Thank you for allowing me to precipitate in this patient's care will continue to work on optimizing him and I am hopeful will make some progress, more so than I was after our last visit.    Thank you for  allowing me to participate in this patient's care.      More than 50 minutes was spent in counseling and coordination of care, including more than 50% in counseling and coordination of care, including review of complex past medical history, discussion of lifestyle changes and options going forward.           Thank you for allowing me to participate in the care of your patient.    Sincerely,     Juli Smith PA-C     [facility]

## 2020-01-28 NOTE — PATIENT INSTRUCTIONS
Call CORE nurse for any questions or concerns:  [phone]   *If you have concerns after hours, please call [phone], option 2 to speak with on call Cardiologist.    1. Medication changes from today:  Increase spironolactone to 50 mg once a day.    Continue on the torsemide 40 mg once a day.  On Monday one of the nurses will call and we may increase the torsemide further.       2. Weigh yourself daily and write it down.  Put your legs up in the middle of the day for 45 mins if you can.  Wear your compression stockings as best you can.       3. Call CORE nurse if your weight is up more than 2 pounds in one day or 5 pounds in one week.     4. Call CORE nurse if you feel more short of breath, have more abdominal bloating, or leg swelling.     5. Continue low sodium diet (less than 2000 mg daily). If you eat less salt, you will retain less fluid.     6. Alcohol can weaken your heart further. You should avoid alcohol or limit its use to special times, such as a holiday or birthday.      7. Do NOT take Aleve or ibuprofen without talking to your doctor first.      8. Lab Results: labs look ok.    Component      Latest Ref Rng & Units 1/27/2020   Sodium      133 - 144 mmol/L 136   Potassium      3.4 - 5.3 mmol/L 3.8   Chloride      94 - 109 mmol/L 102   Carbon Dioxide      20 - 32 mmol/L 30   Anion Gap      3 - 14 mmol/L 4   Glucose      70 - 99 mg/dL 96   Urea Nitrogen      7 - 30 mg/dL 32 (H)   Creatinine      0.66 - 1.25 mg/dL 1.65 (H)   GFR Estimate      >60 mL/min/1.73:m2 40 (L)   GFR Estimate If Black      >60 mL/min/1.73:m2 46 (L)   Calcium      8.5 - 10.1 mg/dL 8.6   N-Terminal Pro Bnp      0 - 450 pg/mL 1,551 (H)        CORE [facility]: Cardiomyopathy, Optimization, Rehabilitation, Education  The CORE Clinic is a heart failure specialty clinic within the [facility] where you will work with specialized nurse practitioners, physician assistants, doctors, and registered nurses. They are dedicated to  helping patients with heart failure to carefully adjust medications, receive education, and learn who and when to call if symptoms develop. They specialize in helping you better understand your condition, slow the progression of your disease, improve the length and quality of your life, help you detect future heart problems before they become life threatening, and avoid hospitalizations.

## 2020-01-28 NOTE — NURSING NOTE
[facility]    Met with Sage and his wife Helena after their visit with Juli SORTO. Pt's affect seemed brighter and skin color looked pinker than our last visit. We had a nice social discussion after reviewing plan of care: two adult children, a boy and girl, along with a granddtr and grandson. [employer] fans, will cheer for [employer] if [employer] not playing.     Reminder sent to RN board to call pt/Helena 2/3 for update after med change.      Med set up:   Wife    Barriers to HF follow-up:   Verbalized that he was unsure of possible benefit of [facility], he remains open to continue working with our team    CM/HF education topics we reviewed:   Difference between CM/HF, sx of HF    Return appointment: Assisted to arrange follow-up in [facility] in 2 and 4 wks per Juli SORTO request  Future Appointments   Date Time Provider Department Center   2/10/2020  2:00 PM [address] [place] [address]   2/13/2020 11:10 AM Juli Smith PA-C [facility] PSA CLIN   2/24/2020  2:00 PM [facility]   3/9/2020 10:00 AM [facility] PSA CLIN   3/9/2020 10:50 AM Juli Smith PA-C [facility] CLIN   3/9/2020  2:00 PM [address] ANA   Helena requested that labs be drawn at time of paracentesis 2/10/2020--will arrange tomorrow--reminder sent.       Medication changes:   Increase spironolactone to 50mg daily, rx sent-confirmed   -reviewed potential side effects   -reviewed rationale for increase    Labs: Lab results from today were reviewed with the patient during the office visit. A copy of the results were provided on the After Visit Summary.     It was a pleasure meeting with Sage and Helena today.

## 2020-02-03 ENCOUNTER — CARE COORDINATION (OUTPATIENT)
Dept: CARDIOLOGY | Facility: CLINIC | Age: 75
End: 2020-02-03

## 2020-02-03 DIAGNOSIS — I50.22 CHRONIC SYSTOLIC CONGESTIVE HEART FAILURE (H): ICD-10-CM

## 2020-02-03 NOTE — PROGRESS NOTES
"[facility]  HF follow up phone assessment    Called pt's wife for update after spironolactone increase 1/28; left  requesting call back. Redd RN 2/3/2020 1136    Spoke w/ pt and his wife Helena today 2/4. They both noted 10# wt change over last wk--though pt says he doesn't feel the change in terms of breathing/swelling. They believe scale in working condition. Tyipcal eating/drinking and bowel habits,  ?increased urine output since harshad increase. Helena said, \"He's doing great!\" Pt said \"I'm doing ok.\"     Recent home weights in pounds:    2/4 236.9   2/3 235.1   2/2 230.9   2/1 226.9    1/31 232.4 <---wife thinks may have eaten less than typical this day   1/30 230.7   1/29 231.5   1/28 233.4 <----spironolactone increased to 50mg daily   1/27 paracentesis   1/16 237.7 <----torsemide increased to 40mg daily   1/13 paracentesis   1/12 249.6   1/10 248   1/8 Diuretic infusion    Current diuretic:   Torsemide 40mg daily   Spironolactone 50mg daily <---increased 1/28      SOB: denied  BLE Edema/ Abdominal Edema: size unchanged, BLE wounds healed  Diet:  Typical; denied increased Na intake over wknd/during Super Bowl, \"Definitely not eating more salt,\" though said he ate chili and hot dog that day  Fluid Intake:  Typical; denied increased ETOH over wknd.       Assessment and Plan:    Wt came down last wk after paracentesis and spironolactone increase, now up 10# from low of 227.   2/10 paracentesis with repeat BMP--ordered, reminder sent to CORE RN board to confirm lab draw w/ [facility] 2/10.  Route to Juli Smith PAC for review of wt change.       Future Appointments   Date Time Provider Department Center   2/10/2020  2:00 PM [address] ANA   2/13/2020 11:10 AM Sarah[facility]   2/24/2020  2:00 PM [address] ANA   3/9/2020 10:00 AM [facility]   3/9/2020 10:50 AM More, ALEXA [facility]       Iza " Cesar 2/4/2020

## 2020-02-04 RX ORDER — TORSEMIDE 20 MG/1
TABLET ORAL
Start: 2020-02-04 | End: 2020-02-13

## 2020-02-04 NOTE — PROGRESS NOTES
[facility]    Called pt to review Juli Smith PAC's recs--no answer on his cell; left non detailed VM requesting call back and noting that I would call his wife.    Called pt's wife Helena and gave her instructions per Juli PAC  --Increase torsemide to 40mg QAM/20mg QPM  --continue same spironolactone  --BMP this wk; arranged for 2/7, reminder sent to RN board to call pt/wife for wt/sx update that day. Told Helena need for repeat BMP previously planned for 2/10 during paracentesis TBD pending 2/7 results/plan.    Iza Guerrero RN BSN   3:02 PM 02/04/20

## 2020-02-04 NOTE — PROGRESS NOTES
I'm glad he's doing well, but I don't like that the weight is trending up again.  Pls have pt get bmp this week, and increase torsemide to 40 mg in am and 20 mg at noon.    WILNER EncarnacionC 2/4/2020 1:54 PM

## 2020-02-07 DIAGNOSIS — I50.20 HEART FAILURE WITH REDUCED EJECTION FRACTION, NYHA CLASS III (H): ICD-10-CM

## 2020-02-07 LAB
ANION GAP SERPL CALCULATED.3IONS-SCNC: 9 MMOL/L (ref 6–17)
BUN SERPL-MCNC: 38 MG/DL (ref 7–30)
CALCIUM SERPL-MCNC: 9 MG/DL (ref 8.5–10.5)
CHLORIDE SERPL-SCNC: 97 MMOL/L (ref 98–107)
CO2 SERPL-SCNC: 30 MMOL/L (ref 23–29)
CREAT SERPL-MCNC: 2.24 MG/DL (ref 0.7–1.3)
GFR SERPL CREATININE-BSD FRML MDRD: 29 ML/MIN/{1.73_M2}
GLUCOSE SERPL-MCNC: 90 MG/DL (ref 70–105)
NT-PROBNP SERPL-MCNC: 2173 PG/ML (ref 0–450)
POTASSIUM SERPL-SCNC: 4 MMOL/L (ref 3.5–5.1)
SODIUM SERPL-SCNC: 132 MMOL/L (ref 136–145)

## 2020-02-07 PROCEDURE — 83880 ASSAY OF NATRIURETIC PEPTIDE: CPT | Performed by: PHYSICIAN ASSISTANT

## 2020-02-07 PROCEDURE — 80048 BASIC METABOLIC PNL TOTAL CA: CPT | Performed by: INTERNAL MEDICINE

## 2020-02-07 PROCEDURE — 36415 COLL VENOUS BLD VENIPUNCTURE: CPT | Performed by: INTERNAL MEDICINE

## 2020-02-07 NOTE — PROGRESS NOTES
[facility]  BMP and BNP results noted from today, labs worsened.     Called wife for wt update, reviewed BMP results showing worsened kidney function. Wife reports pt fell last night, pt thinks he didn't eat much yesterday, did not hit head. Wife made sure pt had a good dinner and breakfast today. Wife also reports today when they got labs, pt was SOB and panting walking to clinic for labs, but pt reports feeling fine and in good spirits. Verified pt taking increased Torsemide at 40mg in am and 20mg in pm, as well as Spironolactone 50mg daily. Wt today 237#, about the same as when Torsemide increased. Pt is scheduled for Paracentesis on Monday 2/10 and visit with JONAS Cuevas 2/13. Will review with JONAS Cuevas. SHIMON Curtis 2:14 PM 02/07/20 2/7 237   2/4       236.9              2/3       235.1              2/2       230.9              2/1       226.9                 1/31     232.4   <---wife thinks may have eaten less than typical this day              1/30     230.7              1/29     231.5              1/28     233.4   <----spironolactone increased to 50mg daily              1/27     paracentesis              1/16     237.7   <----torsemide increased to 40mg daily              1/13     paracentesis              1/12     249.6              1/10     248               Future Appointments   Date Time Provider Department Center   2/10/2020  2:00 PM [address]   2/13/2020 11:10 AM Juli Smith PA-C [facility]   2/24/2020  2:00 PM [address]   3/9/2020 10:00 AM [facility]   3/9/2020 10:50 AM Juli Smith PA-C [facility] CLIN       Component      Latest Ref Rng & Units 1/6/2020 1/27/2020 2/7/2020   Sodium      136 - 145 mmol/L 130 (L) 136 132 (L)   Potassium      3.5 - 5.1 mmol/L 4.2 3.8 4.0   Chloride      98 - 107 mmol/L 100 102 97 (L)   Carbon Dioxide      23 - 29 mmol/L 27 30 30 (H)   Anion Gap      6 - 17  mmol/L 7.2 4 9   Glucose      70 - 105 mg/dL 120 (H) 96 90   Urea Nitrogen      7 - 30 mg/dL 32 (H) 32 (H) 38 (H)   Creatinine      0.70 - 1.30 mg/dL 1.90 (H) 1.65 (H) 2.24 (H)   GFR Estimate      >60 mL/min/1.73:m2 35 (L) 40 (L) 29 (L)   GFR Estimate If Black      >60 mL/min/1.73:m2 42 (L) 46 (L) 35 (L)   Calcium      8.5 - 10.5 mg/dL 8.5 8.6 9.0   N-Terminal Pro Bnp      0 - 450 pg/mL  1,551 (H) 2,173 (H)

## 2020-02-07 NOTE — PROGRESS NOTES
Reviewed with JONAS Cuevas. No changes, continue as is with plan for Paracentesis for Monday, no needs for labs. Will call pt 2/11 for update. Called and reviewed with wife. She stated understanding. Wife will be out of town for 5 days starting Sunday, but aid will be there to help [facility] weigh every day and we can call [facility] directly for anything. SHIMON Curtis 3:55 PM 02/07/20

## 2020-02-10 ENCOUNTER — HOSPITAL ENCOUNTER (OUTPATIENT)
Facility: CLINIC | Age: 75
Discharge: HOME | End: 2020-02-10
Admitting: INTERNAL MEDICINE
Payer: MEDICARE

## 2020-02-10 ENCOUNTER — HOSPITAL ENCOUNTER (OUTPATIENT)
Dept: ULTRASOUND IMAGING | Facility: CLINIC | Age: 75
End: 2020-02-10
Attending: INTERNAL MEDICINE
Payer: MEDICARE

## 2020-02-10 VITALS
OXYGEN SATURATION: 100 % | TEMPERATURE: 96.6 F | DIASTOLIC BLOOD PRESSURE: 54 MMHG | SYSTOLIC BLOOD PRESSURE: 116 MMHG | HEART RATE: 75 BPM | RESPIRATION RATE: 16 BRPM

## 2020-02-10 DIAGNOSIS — K70.31 ALCOHOLIC CIRRHOSIS OF LIVER WITH ASCITES (H): ICD-10-CM

## 2020-02-10 LAB
ANION GAP SERPL CALCULATED.3IONS-SCNC: 3 MMOL/L (ref 3–14)
BUN SERPL-MCNC: 38 MG/DL (ref 7–30)
CALCIUM SERPL-MCNC: 9 MG/DL (ref 8.5–10.1)
CHLORIDE SERPL-SCNC: 103 MMOL/L (ref 94–109)
CO2 SERPL-SCNC: 31 MMOL/L (ref 20–32)
CREAT SERPL-MCNC: 1.9 MG/DL (ref 0.66–1.25)
GFR SERPL CREATININE-BSD FRML MDRD: 33 ML/MIN/{1.73_M2}
GLUCOSE SERPL-MCNC: 119 MG/DL (ref 70–99)
INR PPP: 2.34 (ref 0.86–1.14)
PLATELET # BLD AUTO: 160 10E9/L (ref 150–450)
POTASSIUM SERPL-SCNC: 4 MMOL/L (ref 3.4–5.3)
SODIUM SERPL-SCNC: 137 MMOL/L (ref 133–144)

## 2020-02-10 PROCEDURE — 85610 PROTHROMBIN TIME: CPT | Performed by: RADIOLOGY

## 2020-02-10 PROCEDURE — 80048 BASIC METABOLIC PNL TOTAL CA: CPT | Performed by: RADIOLOGY

## 2020-02-10 PROCEDURE — 36415 COLL VENOUS BLD VENIPUNCTURE: CPT | Performed by: RADIOLOGY

## 2020-02-10 PROCEDURE — 27210282 US PARACENTESIS

## 2020-02-10 PROCEDURE — 85049 AUTOMATED PLATELET COUNT: CPT | Performed by: RADIOLOGY

## 2020-02-10 PROCEDURE — 25000125 ZZHC RX 250: Performed by: RADIOLOGY

## 2020-02-10 PROCEDURE — 40000863 ZZH STATISTIC RADIOLOGY XRAY, US, CT, MAR, NM

## 2020-02-10 RX ORDER — DEXTROSE MONOHYDRATE 25 G/50ML
25-50 INJECTION, SOLUTION INTRAVENOUS
Status: DISCONTINUED | OUTPATIENT
Start: 2020-02-10 | End: 2020-02-10 | Stop reason: HOSPADM

## 2020-02-10 RX ORDER — NICOTINE POLACRILEX 4 MG
15-30 LOZENGE BUCCAL
Status: DISCONTINUED | OUTPATIENT
Start: 2020-02-10 | End: 2020-02-10 | Stop reason: HOSPADM

## 2020-02-10 RX ORDER — LIDOCAINE HYDROCHLORIDE 10 MG/ML
10 INJECTION, SOLUTION EPIDURAL; INFILTRATION; INTRACAUDAL; PERINEURAL ONCE
Status: COMPLETED | OUTPATIENT
Start: 2020-02-10 | End: 2020-02-10

## 2020-02-10 RX ORDER — ALBUMIN (HUMAN) 12.5 G/50ML
12.5 SOLUTION INTRAVENOUS ONCE
Status: DISCONTINUED | OUTPATIENT
Start: 2020-02-10 | End: 2020-02-10 | Stop reason: HOSPADM

## 2020-02-10 RX ORDER — LIDOCAINE 40 MG/G
CREAM TOPICAL
Status: DISCONTINUED | OUTPATIENT
Start: 2020-02-10 | End: 2020-02-10 | Stop reason: HOSPADM

## 2020-02-10 RX ADMIN — LIDOCAINE HYDROCHLORIDE 10 ML: 10 INJECTION, SOLUTION EPIDURAL; INFILTRATION; INTRACAUDAL; PERINEURAL at 14:14

## 2020-02-10 NOTE — PROGRESS NOTES
Care Suites Admission Nursing Note    Patient Information  Name: Antonio Ramirez  Age: 74 year old  Reason for admission: Paracentesis  Care Suites arrival time: 1225    Patient Admission/Assessment   Pre-procedure assessment complete: YES  If abnormal assessment/labs, provider notified: Pending.  NPO: NA  Medications held per instructions/orders: YES  Consent: deferred  Patient oriented to room: YES  Education/questions answered: YES.  AVS/discharge instructions given and reviewed with verbal understanding received.   Plan/other: Proceed once labs reviewed.    Discharge Planning  Accompanied by: Dropped off.  Discharge name/phone number: Lucrecia- [phone]  Overnight post sedation caregiver: No sedation received.  Discharge location: home    Maren Genao RN

## 2020-02-10 NOTE — PROGRESS NOTES
Care Suites Procedure Nursing Note    Patient Information  Name: Antonio Ramirez  Age: 74 year old    Procedure  Procedure: Paracentesis  Procedure start time: 1412  Procedure complete time: 1440  Concerns/abnormal assessment: no  If abnormal assessment, provider notified: N/A  Plan/Other: return to Care Suites for recovery then discharge to home    Rekha Garcia RN     Paracentesis: patient arrived to department via cart, intake info taken. Ultrasound tech performed preliminary scan to find pockets of fluid. MD arrived to explain procedure, obtained consent. Time out was then done. Procedure begun, no issues, drainage in process. Patient was monitored with BP and O2 sats. Patient tolerated well. VSS. 4850 cc rust colored fluid removed from abdomen w/o difficulty. Bandaid applied to site. Dermabond used     1442- Pt back to Care Suites per cart

## 2020-02-10 NOTE — PROGRESS NOTES
Care Suites Post Procedure Note    Patient Information  Name: Antonio Ramirez  Age: 74 year old    Post Procedure  Procedure: Paracentesis.  Time patient returned to Care Suites: 1442  Concerns/abnormal assessment: None.  If abnormal assessment, provider notified: N/A  Plan/Other: home.    Maren Genao RN     Care Suites Discharge Nursing Note    Patient Information  Name: Antonio Ramirez  Age: 74 year old    Discharge Education:  Discharge instructions reviewed: YES.  Patient refuses to take AVS home.  Additional education/resources provided: No.  Patient/patient representative verbalizes understanding: YES  Patient discharging on new medications: YES and NO  Medication education completed: YES    Discharge Plans:   Discharge location: home  Discharge ride contacted: YES  Approximate discharge time: 1505    Discharge Criteria:  Discharge criteria met and vital signs stable: YES    Patient Belongs:  Patient belongings returned to patient: YES    Maren Genao RN

## 2020-02-13 ENCOUNTER — OFFICE VISIT (OUTPATIENT)
Dept: CARDIOLOGY | Facility: CLINIC | Age: 75
End: 2020-02-13
Payer: MEDICARE

## 2020-02-13 VITALS
OXYGEN SATURATION: 95 % | HEIGHT: 74 IN | SYSTOLIC BLOOD PRESSURE: 102 MMHG | BODY MASS INDEX: 30.8 KG/M2 | HEART RATE: 78 BPM | DIASTOLIC BLOOD PRESSURE: 66 MMHG | WEIGHT: 240 LBS

## 2020-02-13 DIAGNOSIS — I50.22 CHRONIC SYSTOLIC CONGESTIVE HEART FAILURE (H): Primary | ICD-10-CM

## 2020-02-13 DIAGNOSIS — I10 ESSENTIAL HYPERTENSION: ICD-10-CM

## 2020-02-13 DIAGNOSIS — I25.5 ISCHEMIC CARDIOMYOPATHY: ICD-10-CM

## 2020-02-13 DIAGNOSIS — I50.20 HEART FAILURE WITH REDUCED EJECTION FRACTION, NYHA CLASS III (H): ICD-10-CM

## 2020-02-13 DIAGNOSIS — D64.9 ANEMIA, UNSPECIFIED TYPE: ICD-10-CM

## 2020-02-13 PROCEDURE — 99214 OFFICE O/P EST MOD 30 MIN: CPT | Performed by: PHYSICIAN ASSISTANT

## 2020-02-13 RX ORDER — TORSEMIDE 20 MG/1
60 TABLET ORAL EVERY MORNING
Qty: 360 TABLET | Refills: 3 | Status: SHIPPED | OUTPATIENT
Start: 2020-02-13 | End: 2020-03-09

## 2020-02-13 ASSESSMENT — MIFFLIN-ST. JEOR: SCORE: 1888.38

## 2020-02-13 NOTE — PROGRESS NOTES
Service Date: 2020      PRIMARY CARDIOLOGIST:  Dr. Downs.      REASON FOR VISIT:  [facility] follow up     HISTORY OF PRESENT ILLNESS:  Mr. Ramirez is a complex 74-year-old gentleman seen today at the request of Dr. Ovalle for [facility] enrollment to see if we can prevent hospitalizations.  His past medical history is significant for the followin.  Alcoholic cirrhosis of the liver with scheduled standing paracentesis every 2 weeks, with the last several weeks taking off about 7 liters each.   2.  Alcohol dependency with ongoing alcohol 3-4 drinks per night in spite of recommendations otherwise.   3.  Coronary artery disease with history of MI and CAB.   4.  Ischemic cardiomyopathy with EF 40%-45% and chronic systolic heart failure.   5.  Severe pulmonary hypertension with elevated pulmonary pressures, likely multifactorial due to liver failure, hypoproteinemia and heart failure, with the patient declining further PH workup by Dr. Ovalle.   6.  Persistent AFib, on anticoagulation.   7.  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   8.  History of large-cell B-cell non-Hodgkin's lymphoma.   9.  CKD.   10.  Chronic anemia and thrombocytopenia.   11.  History of PEA and possibly VT arrest in 2019.   12.  History of PE and antiphospholipid antibody syndrome with history of IVC filter placement, on chronic anticoagulation.   13.  Mild to moderate mitral regurg, mild mitral stenosis, mild tricuspid regurgitation.   14.  Hx of prostate ca with radioactive seed placement, in remission      I met the patient in early January when he had been referred to Dr. Ovalle for referral for CORE involvement.  At that point it was very clear that he was hypervolemic but his breathing was okay with the exception of when he needed a paracentesis.  Once that is complete his breathing tended to improve.  At my visit we set him up for an infusion  clinic.  He even with IV diuretics he did not really diurese.     Since that time, we been working with outpatient diuretics and slowly increasing them.  This has been a bit to the distress of his kidneys with his creatinine going up to a high of 2.3.  With this, although his weight has been more stable and is ranged between 232 to 240 pounds at home.  His wife went out of town and he has not been weighing for the last several days.  He had a paracentesis on Monday and this was only for 4.8 L.  On that day he went in at 240 pounds and he has not weighed himself at home since.    He notes that this time before paracentesis he was not quite as short of breath as he typically was he is also had less been apnea.  He continues to have severe peripheral edema.  He has been wearing his wraps intermittently.  Now that his wife is gone he has a PCA coming in to help him and he does have to do more for himself.  Previously he got his food and drinks delivered to him by his wife.       SOCIAL HISTORY:  He is , wife, Helena.  They have started working on lowering his sodium and increasing the protein in his diet.  He still uses a saltshaker although rarely.  At least, he is starting to think in that direction.  He drinks 3-4 drinks a day now or previously drink for about 6 in the evening to 10 in the evening.  He has no interest in quitting..  He is a lifelong nonsmoker.  He relays to me today that his son went to [facility].     PHYSICAL EXAMINATION:   GENERAL:  Exam reveals an elderly, frail, irritable gentleman in no acute distress.    HEENT:  He is normocephalic and atraumatic.   HEART:  Irregularly irregular with a 3/6 systolic murmur heard best at the left sternal border.  He has about 10 to 12 cm of JVP at 90 degrees with positive hepatojugular reflux.  LUNGS: Diminished, although improving.  I do not appreciate wheezing rales or rhonchi.  EXTREMITIES:  He has 2 to 3+ pitting edema in his right leg up into his  thigh.  Stump has 2+ pitting edema up into his thigh.   ABDOMEN:  Distended consistent with ascites.          ASSESSMENT AND PLAN:  This is a complex 74-year-old gentleman with alcoholic cirrhosis with biweekly paracenteses, alcohol dependence, low protein levels, history of coronary artery disease and mild cardiomyopathy, pulmonary hypertension likely secondary to all of the above, CKD, chronic thrombocytopenia and anemia who I recently met in the core clinic.  We discussed at length how difficult it is to diurese him to keep the volume off with his low protein, slight anemia, ongoing drinking, liver failure, and somewhat noncompliance with diet.  Today there is some confusion on how much torsemide he is taking and he thinks he is only taking 40 mg in the morning instead of an additional 20 in the afternoon.  To simplify things were and increase his torsemide to 60 mg in the morning we will keep him on 50 mg of spironolactone daily.  If his weight does not trend down the next step would be to go to 100 mg of spironolactone, I prefer this in a split dose but the patient may be adamant about taking it daily.  I do think, we have made a very tiny amount of improvement for him and that his weight is about 12 pounds lower than when I first met him and were stable there.  His paracenteses volumes are lower, but we still have a long way to go.  If we are able to get off all his peripheral edema I suspect his dry weight is somewhere around 200 to 210 pounds.      We will continue to follow him closely, will see him back in about 3 weeks with a BMP, BNP, hemoglobin, red tics and iron studies at that time.  We will potentially arrange for iron infusion if need be.  Of note he prefers to do labs on day as he is already coming to the clinic and we will try to coordinate that.  We will have him follow closely with our nurses over the phone in the meantime.    Thank you for allowing me to participate in this patient's care.          Juli Smith PA-C

## 2020-02-13 NOTE — LETTER
2020    Main Hardy MD  [address]  [facility] [address]    RE: Antonio KU Ashley       Dear Colleague,    I had the pleasure of seeing Antonio Ramirez in the [facility].    Service Date: 2020      PRIMARY CARDIOLOGIST:  Dr. Downs.      REASON FOR VISIT:  [facility] follow up     HISTORY OF PRESENT ILLNESS:  Mr. Ramirez is a complex 74-year-old gentleman seen today at the request of Dr. Ovalle for [facility] enrollment to see if we can prevent hospitalizations.  His past medical history is significant for the followin.  Alcoholic cirrhosis of the liver with scheduled standing paracentesis every 2 weeks, with the last several weeks taking off about 7 liters each.   2.  Alcohol dependency with ongoing alcohol 3-4 drinks per night in spite of recommendations otherwise.   3.  Coronary artery disease with history of MI and CAB.   4.  Ischemic cardiomyopathy with EF 40%-45% and chronic systolic heart failure.   5.  Severe pulmonary hypertension with elevated pulmonary pressures, likely multifactorial due to liver failure, hypoproteinemia and heart failure, with the patient declining further PH workup by Dr. Ovalle.   6.  Persistent AFib, on anticoagulation.   7.  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   8.  History of large-cell B-cell non-Hodgkin's lymphoma.   9.  CKD.   10.  Chronic anemia and thrombocytopenia.   11.  History of PEA and possibly VT arrest in 2019.   12.  History of PE and antiphospholipid antibody syndrome with history of IVC filter placement, on chronic anticoagulation.   13.  Mild to moderate mitral regurg, mild mitral stenosis, mild tricuspid regurgitation.   14.  Hx of prostate ca with radioactive seed placement, in remission      I met the patient in early January when he had been referred to Dr. Ovalle for referral for CORE involvement.  At that point it  was very clear that he was hypervolemic but his breathing was okay with the exception of when he needed a paracentesis.  Once that is complete his breathing tended to improve.  At my visit we set him up for an infusion clinic.  He even with IV diuretics he did not really diurese.     Since that time, we been working with outpatient diuretics and slowly increasing them.  This has been a bit to the distress of his kidneys with his creatinine going up to a high of 2.3.  With this, although his weight has been more stable and is ranged between 232 to 240 pounds at home.  His wife went out of town and he has not been weighing for the last several days.  He had a paracentesis on Monday and this was only for 4.8 L.  On that day he went in at 240 pounds and he has not weighed himself at home since.    He notes that this time before paracentesis he was not quite as short of breath as he typically was he is also had less been apnea.  He continues to have severe peripheral edema.  He has been wearing his wraps intermittently.  Now that his wife is gone he has a PCA coming in to help him and he does have to do more for himself.  Previously he got his food and drinks delivered to him by his wife.       SOCIAL HISTORY:  He is , wife, Helena.  They have started working on lowering his sodium and increasing the protein in his diet.  He still uses a saltshaker although rarely.  At least, he is starting to think in that direction.  He drinks 3-4 drinks a day now or previously drink for about 6 in the evening to 10 in the evening.  He has no interest in quitting..  He is a lifelong nonsmoker.  He relays to me today that his son went to [facility].     PHYSICAL EXAMINATION:   GENERAL:  Exam reveals an elderly, frail, irritable gentleman in no acute distress.    HEENT:  He is normocephalic and atraumatic.   HEART:  Irregularly irregular with a 3/6 systolic murmur heard best at the left sternal border.  He has about 10 to 12 cm of  JVP at 90 degrees with positive hepatojugular reflux.  LUNGS: Diminished, although improving.  I do not appreciate wheezing rales or rhonchi.  EXTREMITIES:  He has 2 to 3+ pitting edema in his right leg up into his thigh.  Stump has 2+ pitting edema up into his thigh.   ABDOMEN:  Distended consistent with ascites.          ASSESSMENT AND PLAN:  This is a complex 74-year-old gentleman with alcoholic cirrhosis with biweekly paracenteses, alcohol dependence, low protein levels, history of coronary artery disease and mild cardiomyopathy, pulmonary hypertension likely secondary to all of the above, CKD, chronic thrombocytopenia and anemia who I recently met in the core clinic.  We discussed at length how difficult it is to diurese him to keep the volume off with his low protein, slight anemia, ongoing drinking, liver failure, and somewhat noncompliance with diet.  Today there is some confusion on how much torsemide he is taking and he thinks he is only taking 40 mg in the morning instead of an additional 20 in the afternoon.  To simplify things were and increase his torsemide to 60 mg in the morning we will keep him on 50 mg of spironolactone daily.  If his weight does not trend down the next step would be to go to 100 mg of spironolactone, I prefer this in a split dose but the patient may be adamant about taking it daily.  I do think, we have made a very tiny amount of improvement for him and that his weight is about 12 pounds lower than when I first met him and were stable there.  His paracenteses volumes are lower, but we still have a long way to go.  If we are able to get off all his peripheral edema I suspect his dry weight is somewhere around 200 to 210 pounds.      We will continue to follow him closely, will see him back in about 3 weeks with a BMP, BNP, hemoglobin, red tics and iron studies at that time.  We will potentially arrange for iron infusion if need be.  Of note he prefers to do labs on day as he is  already coming to the clinic and we will try to coordinate that.  We will have him follow closely with our nurses over the phone in the meantime.    Thank you for allowing me to participate in this patient's care.         Jlui Smith PA-C        Thank you for allowing me to participate in the care of your patient.    Sincerely,     Juli Smith PA-C     [facility]

## 2020-02-13 NOTE — PATIENT INSTRUCTIONS
Call CORE nurse for any questions or concerns Mon-Fri 8am-4pm:                                                #[phone]                                       For concerns after hours:                                               #[phone]     1: Medication changes: Please take torsemide 60 mg once a day in the morning.  This is 3 torsemide pills.    Also make sure you're taking spironolactone 50 mg once a day.      2: Plan from today: please start weighing yourself again.  I'll see you back on March 5th.      3: Lab results: see attached; labs are improved overall.    Component      Latest Ref Rng & Units 1/27/2020 2/7/2020 2/10/2020   Sodium      133 - 144 mmol/L 136 132 (L) 137   Potassium      3.4 - 5.3 mmol/L 3.8 4.0 4.0   Chloride      94 - 109 mmol/L 102 97 (L) 103   Carbon Dioxide      20 - 32 mmol/L 30 30 (H) 31   Anion Gap      3 - 14 mmol/L 4 9 3   Glucose      70 - 99 mg/dL 96 90 119 (H)   Urea Nitrogen      7 - 30 mg/dL 32 (H) 38 (H) 38 (H)   Creatinine      0.66 - 1.25 mg/dL 1.65 (H) 2.24 (H) 1.90 (H)   GFR Estimate      >60 mL/min/1.73:m2 40 (L) 29 (L) 33 (L)   GFR Estimate If Black      >60 mL/min/1.73:m2 46 (L) 35 (L) 39 (L)   Calcium      8.5 - 10.1 mg/dL 8.6 9.0 9.0

## 2020-02-17 ENCOUNTER — TELEPHONE (OUTPATIENT)
Dept: MEDSURG UNIT | Facility: CLINIC | Age: 75
End: 2020-02-17

## 2020-02-27 ENCOUNTER — TELEPHONE (OUTPATIENT)
Dept: MEDSURG UNIT | Facility: CLINIC | Age: 75
End: 2020-02-27

## 2020-02-28 ENCOUNTER — HOSPITAL ENCOUNTER (OUTPATIENT)
Facility: CLINIC | Age: 75
Discharge: HOME | End: 2020-02-28
Admitting: PHYSICIAN ASSISTANT
Payer: MEDICARE

## 2020-02-28 ENCOUNTER — HOSPITAL ENCOUNTER (OUTPATIENT)
Dept: ULTRASOUND IMAGING | Facility: CLINIC | Age: 75
Discharge: HOME | End: 2020-02-28
Attending: INTERNAL MEDICINE | Admitting: INTERNAL MEDICINE
Payer: MEDICARE

## 2020-02-28 VITALS
BODY MASS INDEX: 30.8 KG/M2 | TEMPERATURE: 96 F | WEIGHT: 240 LBS | HEIGHT: 74 IN | HEART RATE: 82 BPM | SYSTOLIC BLOOD PRESSURE: 113 MMHG | OXYGEN SATURATION: 99 % | DIASTOLIC BLOOD PRESSURE: 53 MMHG | RESPIRATION RATE: 16 BRPM

## 2020-02-28 DIAGNOSIS — K70.31 ALCOHOLIC CIRRHOSIS OF LIVER WITH ASCITES (H): ICD-10-CM

## 2020-02-28 PROCEDURE — 40000863 ZZH STATISTIC RADIOLOGY XRAY, US, CT, MAR, NM

## 2020-02-28 PROCEDURE — 27210190 US PARACENTESIS

## 2020-02-28 PROCEDURE — 25000125 ZZHC RX 250: Performed by: RADIOLOGY

## 2020-02-28 RX ORDER — LIDOCAINE HYDROCHLORIDE 10 MG/ML
10 INJECTION, SOLUTION EPIDURAL; INFILTRATION; INTRACAUDAL; PERINEURAL ONCE
Status: COMPLETED | OUTPATIENT
Start: 2020-02-28 | End: 2020-02-28

## 2020-02-28 RX ORDER — NICOTINE POLACRILEX 4 MG
15-30 LOZENGE BUCCAL
Status: CANCELLED | OUTPATIENT
Start: 2020-02-28

## 2020-02-28 RX ORDER — DEXTROSE MONOHYDRATE 25 G/50ML
25-50 INJECTION, SOLUTION INTRAVENOUS
Status: CANCELLED | OUTPATIENT
Start: 2020-02-28

## 2020-02-28 RX ADMIN — LIDOCAINE HYDROCHLORIDE 10 ML: 10 INJECTION, SOLUTION EPIDURAL; INFILTRATION; INTRACAUDAL; PERINEURAL at 10:46

## 2020-02-28 ASSESSMENT — MIFFLIN-ST. JEOR: SCORE: 1888.38

## 2020-02-28 NOTE — DISCHARGE INSTRUCTIONS

## 2020-02-28 NOTE — PROGRESS NOTES
Removed 5200 ml cloudy pink tinged fluid from right side of abdomen via ultrasound by Dr Edwards. Pt tolerated procedure well.  No dermabond needed at site today.

## 2020-02-28 NOTE — PROGRESS NOTES
"Care Suites Admission Nursing Note    Patient Information  Name: Antonio Ramirez  Age: 74 year old  Reason for admission:  Paracentesis   Care Suites arrival time:  0925    Patient Admission/Assessment   Pre-procedure assessment complete: Yes.  Patient is being followed  very closely by Juli MCDANIEL (see recent note).  Patient has been ill with an URI, is discouraged/pessimistic, doesn't feel like calling MD/PA/NP regarding illness/not taking Xarelto for a week... says he \"doesn't trust the medical people anymore\"  He also said he feels like he may be getting dehydrated, with muscle cramps, so has not taken his (recently increased) water pills for a couple days.  He says that he will wait to discuss with his PA until his 3/6/2020 appointment.   If abnormal assessment/labs, provider notified:  Labs from 2/10/2020 are WDL.  Right leg and left leg above amp are very edematous.  This has been ongoing.  NPO: N/A  Medications held per instructions/orders: Yes, actually has held his Xarelto since last Friday, anticipating paracentesis on Monday, but was ill with URI, so he cancelled the para, and had not restarted his Xarelto.  Consent:  To be done in procedure room by provider  Patient oriented to room: Yes  Education/questions answered: Yes.  I did encourage patient to do right ankle pumps, resume home meds, especially Xarelto, keep taking aspirin, water pills, and to call the nurse line/keep PA updated.   Plan/other:  proceed    Discharge Planning  Accompanied by:  Wife, Helena to pick him up afterward  Discharge name/phone number:  Helena Ramirez [phone]  Overnight post sedation caregiver: n/a  Discharge location: home    1030  Report to Yoly ROMERO RN/procedure nurse    Anuja Bentley RN           "

## 2020-02-28 NOTE — PROGRESS NOTES
Care Suites Post Procedure Note    Patient Information  Name: Antonio Ramirez  Age: 74 year old    Post Procedure  Procedure: Paracentesis   Time patient returned to Care Suites: 1120  Concerns/abnormal assessment: NA  If abnormal assessment, provider notified:  NA  Plan/Other: Continue post procedure plan of care  Right paracentesis site CDI, no drainage.  VS stable, denies any pain or discomfort.   Reinforce discharge instruction.  All questions are answered.  Pt can be discharged about 1200 when his ride is here.    Amirah Balderas RN     Care Suites Discharge Nursing Note    Patient Information  Name: Antonio Ramirez  Age: 74 year old    Discharge Education:  Discharge instructions reviewed: yes  Additional education/resources provided: NA  Patient/patient representative verbalizes understanding:  Yes     Discharge Plans:   Discharge location:  Door 2  Discharge ride contacted:  Wife per pt  Approximate discharge time:1200    Discharge Criteria:  Discharge criteria met and vital signs stable: yes    Patient Belongs:  Patient belongings returned to patient:  Yes    Amirah Balderas RN

## 2020-02-28 NOTE — PROGRESS NOTES
Return to [facility] via cart.  Report given to Vicente ROBINS. Pt given po fluids and declines solids at this time.

## 2020-03-02 RX ORDER — DEXTROSE MONOHYDRATE 25 G/50ML
25-50 INJECTION, SOLUTION INTRAVENOUS
Status: CANCELLED | OUTPATIENT
Start: 2020-03-02

## 2020-03-02 RX ORDER — NICOTINE POLACRILEX 4 MG
15-30 LOZENGE BUCCAL
Status: CANCELLED | OUTPATIENT
Start: 2020-03-02

## 2020-03-02 RX ORDER — ALBUMIN (HUMAN) 12.5 G/50ML
12.5 SOLUTION INTRAVENOUS ONCE
Status: CANCELLED | OUTPATIENT
Start: 2020-03-02 | End: 2020-03-02

## 2020-03-03 ENCOUNTER — TELEPHONE (OUTPATIENT)
Dept: MEDSURG UNIT | Facility: CLINIC | Age: 75
End: 2020-03-03

## 2020-03-09 ENCOUNTER — HOSPITAL ENCOUNTER (OUTPATIENT)
Facility: CLINIC | Age: 75
Discharge: HOME | End: 2020-03-09
Admitting: RADIOLOGY
Payer: MEDICARE

## 2020-03-09 ENCOUNTER — HOSPITAL ENCOUNTER (OUTPATIENT)
Dept: ULTRASOUND IMAGING | Facility: CLINIC | Age: 75
End: 2020-03-09
Attending: INTERNAL MEDICINE
Payer: MEDICARE

## 2020-03-09 ENCOUNTER — OFFICE VISIT (OUTPATIENT)
Dept: CARDIOLOGY | Facility: CLINIC | Age: 75
End: 2020-03-09
Payer: MEDICARE

## 2020-03-09 ENCOUNTER — CARE COORDINATION (OUTPATIENT)
Dept: CARDIOLOGY | Facility: CLINIC | Age: 75
End: 2020-03-09

## 2020-03-09 VITALS
OXYGEN SATURATION: 94 % | HEART RATE: 62 BPM | HEIGHT: 74 IN | WEIGHT: 239.7 LBS | BODY MASS INDEX: 30.76 KG/M2 | SYSTOLIC BLOOD PRESSURE: 118 MMHG | DIASTOLIC BLOOD PRESSURE: 64 MMHG

## 2020-03-09 VITALS
HEART RATE: 66 BPM | RESPIRATION RATE: 18 BRPM | SYSTOLIC BLOOD PRESSURE: 112 MMHG | TEMPERATURE: 97 F | OXYGEN SATURATION: 96 % | DIASTOLIC BLOOD PRESSURE: 57 MMHG

## 2020-03-09 DIAGNOSIS — D64.9 ANEMIA, UNSPECIFIED TYPE: ICD-10-CM

## 2020-03-09 DIAGNOSIS — I50.22 CHRONIC SYSTOLIC CONGESTIVE HEART FAILURE (H): ICD-10-CM

## 2020-03-09 DIAGNOSIS — K70.31 ALCOHOLIC CIRRHOSIS OF LIVER WITH ASCITES (H): ICD-10-CM

## 2020-03-09 LAB
ANION GAP SERPL CALCULATED.3IONS-SCNC: 13.7 MMOL/L (ref 6–17)
BUN SERPL-MCNC: 28 MG/DL (ref 7–30)
CALCIUM SERPL-MCNC: 9.3 MG/DL (ref 8.5–10.5)
CHLORIDE SERPL-SCNC: 98 MMOL/L (ref 98–107)
CO2 SERPL-SCNC: 26 MMOL/L (ref 23–29)
CREAT SERPL-MCNC: 1.8 MG/DL (ref 0.7–1.3)
ERYTHROCYTE [DISTWIDTH] IN BLOOD BY AUTOMATED COUNT: 16.9 % (ref 10–15)
FERRITIN SERPL-MCNC: 104 NG/ML (ref 26–388)
GFR SERPL CREATININE-BSD FRML MDRD: 37 ML/MIN/{1.73_M2}
GLUCOSE SERPL-MCNC: 106 MG/DL (ref 70–105)
HCT VFR BLD AUTO: 26.6 % (ref 40–53)
HGB BLD-MCNC: 7.7 G/DL (ref 13.3–17.7)
IRON SATN MFR SERPL: 6 % (ref 15–46)
IRON SERPL-MCNC: 22 UG/DL (ref 35–180)
MCH RBC QN AUTO: 21.8 PG (ref 26.5–33)
MCHC RBC AUTO-ENTMCNC: 28.9 G/DL (ref 31.5–36.5)
MCV RBC AUTO: 75 FL (ref 78–100)
NT-PROBNP SERPL-MCNC: 2044 PG/ML (ref 0–450)
PLATELET # BLD AUTO: 184 10E9/L (ref 150–450)
POTASSIUM SERPL-SCNC: 3.7 MMOL/L (ref 3.5–5.1)
RBC # BLD AUTO: 3.53 10E12/L (ref 4.4–5.9)
RETICS # AUTO: 76.2 10E9/L (ref 25–95)
RETICS/RBC NFR AUTO: 2.2 % (ref 0.5–2)
SODIUM SERPL-SCNC: 134 MMOL/L (ref 136–145)
TIBC SERPL-MCNC: 341 UG/DL (ref 240–430)
WBC # BLD AUTO: 4.1 10E9/L (ref 4–11)

## 2020-03-09 PROCEDURE — 40000863 ZZH STATISTIC RADIOLOGY XRAY, US, CT, MAR, NM

## 2020-03-09 PROCEDURE — 83550 IRON BINDING TEST: CPT | Performed by: PHYSICIAN ASSISTANT

## 2020-03-09 PROCEDURE — 83540 ASSAY OF IRON: CPT | Performed by: PHYSICIAN ASSISTANT

## 2020-03-09 PROCEDURE — 85045 AUTOMATED RETICULOCYTE COUNT: CPT | Performed by: PHYSICIAN ASSISTANT

## 2020-03-09 PROCEDURE — 99215 OFFICE O/P EST HI 40 MIN: CPT | Performed by: PHYSICIAN ASSISTANT

## 2020-03-09 PROCEDURE — 82728 ASSAY OF FERRITIN: CPT | Performed by: PHYSICIAN ASSISTANT

## 2020-03-09 PROCEDURE — 25000125 ZZHC RX 250: Performed by: RADIOLOGY

## 2020-03-09 PROCEDURE — 83880 ASSAY OF NATRIURETIC PEPTIDE: CPT | Performed by: PHYSICIAN ASSISTANT

## 2020-03-09 PROCEDURE — 49083 ABD PARACENTESIS W/IMAGING: CPT

## 2020-03-09 PROCEDURE — 85027 COMPLETE CBC AUTOMATED: CPT | Performed by: PHYSICIAN ASSISTANT

## 2020-03-09 PROCEDURE — 80048 BASIC METABOLIC PNL TOTAL CA: CPT | Performed by: PHYSICIAN ASSISTANT

## 2020-03-09 PROCEDURE — 36415 COLL VENOUS BLD VENIPUNCTURE: CPT | Performed by: PHYSICIAN ASSISTANT

## 2020-03-09 RX ORDER — LIDOCAINE HYDROCHLORIDE 10 MG/ML
10 INJECTION, SOLUTION EPIDURAL; INFILTRATION; INTRACAUDAL; PERINEURAL ONCE
Status: COMPLETED | OUTPATIENT
Start: 2020-03-09 | End: 2020-03-09

## 2020-03-09 RX ORDER — TORSEMIDE 20 MG/1
40 TABLET ORAL EVERY MORNING
Qty: 180 TABLET | Refills: 3 | Status: ON HOLD | OUTPATIENT
Start: 2020-03-09 | End: 2021-01-01

## 2020-03-09 RX ADMIN — LIDOCAINE HYDROCHLORIDE 10 ML: 10 INJECTION, SOLUTION EPIDURAL; INFILTRATION; INTRACAUDAL; PERINEURAL at 14:18

## 2020-03-09 ASSESSMENT — MIFFLIN-ST. JEOR: SCORE: 1887.02

## 2020-03-09 NOTE — PROGRESS NOTES
Service Date: 3/9/2020      PRIMARY CARDIOLOGIST:  Dr. Downs.      REASON FOR VISIT:  [facility] follow up     HISTORY OF PRESENT ILLNESS:  Mr. Ramirez is a complex 74-year-old gentleman seen today at the request of Dr. Ovalle for [facility] enrollment to see if we can prevent hospitalizations.  His past medical history is significant for the followin.  Alcoholic cirrhosis with scheduled standing paracentesis every 2 weeks, with the last several weeks taking off about 7 liters each, starting in 2016.   2.  Alcohol dependency with ongoing alcohol 3-4 drinks per night in spite of recommendations otherwise.   3.  Coronary artery disease with history of MI and CAB.   4.  Ischemic cardiomyopathy with EF 40%-45% and chronic systolic heart failure.   5.  Severe pulmonary hypertension with elevated pulmonary pressures, likely multifactorial due to liver failure, hypoproteinemia and heart failure, with the patient declining further PH workup by Dr. Ovalle.   6.  Persistent AFib, on anticoagulation.   7.  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   8.  History of large-cell B-cell non-Hodgkin's lymphoma.   9.  CKD.   10.  Chronic anemia and thrombocytopenia.   11.  History of PEA and possibly VT arrest in 2019.   12.  History of PE and antiphospholipid antibody syndrome with history of IVC filter placement in , on chronic anticoagulation.   13.  Mild to moderate mitral regurg, mild mitral stenosis, mild tricuspid regurgitation.   14.  Hx of prostate ca with radioactive seed placement, in remission      I met the patient in early January when he had been referred to Dr. Ovalle for referral for [facility] involvement.  At that point it was very clear that he was hypervolemic but his breathing was okay with the exception of when he needed a paracentesis.  Once that is complete his breathing tended to improve.  At my visit we set him up for an  infusion clinic.  He even with IV diuretics he did not really diurese.     At our last visit, I increased his torsemide from 40 mg a day to 60 mg a day.  We kept his spironolactone and 50 mg a day.  We asked him to continue his daily weights and call if he had issues.  He tells me that not long after that he started getting charley horses.  These were in his calves in the middle of the night also on his forearms and hands.  They are quite miserable.  He decided that those were likely from the Spironolactone and torsemide so he quit them both.  He has been off them for about 2 weeks.  He also stopped weighing himself at that time.  He quit wearing his wraps on his legs as well.  He does not know if his weight went up but he does note that his right leg became more swollen and he is more short of breath when he moves throughout his house.  He does get winded going from the family room to the kitchen or the bathroom although he says this is only slightly more than before.  His left prosthetic still fits well.  He noted a decrease in urine output.  He denies orthopnea or PND.  He did have some sort of viral illness but this has seemed to resolve.  He has not had any syncope or presyncope.  He has not noted any black tarry stools or bright red blood in his stools.  He did have emesis the other day as he was had an upset stomach but this look like typical emesis with food and not like coffee ground.       SOCIAL HISTORY:  He is , wife, [place].  They have started working on lowering his sodium and increasing the protein in his diet.  He still uses a saltshaker although rarely.  At least, he is starting to think in that direction.  He drinks 3-4 drinks a day now or previously drink for about 6 in the evening to 10 in the evening.  He has no interest in quitting..  He is a lifelong nonsmoker.  He relays to me today that his son went to [facility].     PHYSICAL EXAMINATION:   GENERAL:  Exam reveals an elderly, frail,  irritable gentleman in no acute distress.    HEENT:  He is normocephalic and atraumatic.   HEART: Regular in rate and rhythm with a 3/6 systolic murmur heard best at the left sternal border.  I do not appreciate JVP today at 90 degrees.  LUNGS: Significantly diminished bilaterally.  I do not appreciate wheezing rales or rhonchi.  EXTREMITIES:  He has 3+ pitting edema in his right leg up into his thigh.  Stump has 3+ pitting edema up into his thigh.   ABDOMEN:  Distended consistent with ascites.      ASSESSMENT AND PLAN:  This is a complex 74-year-old gentleman with alcoholic cirrhosis with biweekly paracenteses, alcohol dependence, low protein levels, history of coronary artery disease and mild cardiomyopathy, pulmonary hypertension likely secondary to all of the above, CKD, chronic thrombocytopenia and anemia who I recently met in the core clinic.  Unfortunately labs today show him significantly anemic with a hemoglobin of 7.7, as well as low iron studies.  Also unfortunately, he has been noncompliant and stopped taking his medications without letting us know.  Very mehran discussion today.  I told him I be happy to work with him and adjust things if he has adverse effects from his meds but it is difficult for me to care for him if he just stops and does not let us know once going on.  I think he is clearly hypervolemic today and certainly his anemia is playing a large part of it.  I also spoke suspect that he may have bilateral pleural effusions.  Looking back through his chart and CTs even at [facility] he has had these for several years.  Given that I am not sure if we did thoracenteses on these if they would resolve or if he now has trapped lungs.  Regardless, we will attempt more aggressive diuresis.  We will get him back on some p.o. diuretic will do torsemide at 40 mg a day and spironolactone 50 mg a day.  We will have him come in Wednesday for infusion clinic.  Our infusion clinic plan will be as follows:  1.   40 mg IV Lasix bolus  2.  500 mg IV Diuril  3.  20 mg of Lasix per hour for 4 hours.  4.  IV iron in the form of either Saturnino for or Injectafer what ever is improved by the hospital  5.  Chest x-ray PA and bilateral.    He assures me that he will be compliant in the next several weeks although I think that is probably unlikely.  He will get his regular slightly scheduled paracentesis today.  I will see him back in about 3 to 4 weeks.  If his diuresis in infusion clinic is successful this is something we could consider to schedule weekly.  We will see how he does this week and go from there.  We did consider an anemia work-up, but at this point the patient is barely compliant enough to just take his meds and will start with that as a goal.    We also discussed hospice as an option, the patient states he is not interested in that even though it would mean him getting to do what ever he would like.  We did review that he has multisystem organ failure including renal failure, heart failure and liver failure.    Thank you for allowing me to participate in this patient's care.      Juli Smith PA-C     More than 50 minutes was spent with this patient, more than 50% including counseling of his complex heart condition, coordination of care, and review of his complex situation and our approach going forward.    HPI and Plan:   See dictation    Orders this Visit:  Orders Placed This Encounter   Procedures     Basic metabolic panel     Hemoglobin     Follow-Up with [facility] - [facility] visit     Follow-Up with Cardiology Diuretic Infusion Care     Orders Placed This Encounter   Medications     torsemide (DEMADEX) 20 MG tablet     Sig: Take 2 tablets (40 mg) by mouth every morning     Dispense:  180 tablet     Refill:  3     Medications Discontinued During This Encounter   Medication Reason     torsemide (DEMADEX) 20 MG tablet          Encounter Diagnosis   Name Primary?     Chronic systolic congestive heart failure (H)   "      CURRENT MEDICATIONS:  No current outpatient medications on file.       ALLERGIES     Allergies   Allergen Reactions     Kd:Minocycline+[place] [facility] GI Disturbance     11/01/16-PT IS NOT AWARE OF THIS REACTION     Ciprofloxacin Itching     Severe itching \"like ants were crawling\"     Hmg-Coa-R Inhibitors Other (See Comments)     Rhabdomyolysis occurred within a couple days  Rhabdomyolysis       PAST MEDICAL HISTORY:  Past Medical History:   Diagnosis Date     Alcoholism (H)      Amputated left leg (H)      Anemia      Anticardiolipin antibody syndrome (H)      Antiphospholipid antibody syndrome (H)      Antiplatelet or antithrombotic long-term use      Aortic root dilatation (H)      Aortic stenosis      Arthritis      Balance problem      Coronary artery disease     CABG 2007: SVG to LAD, SVG to diagonal, SVG to OM; cardiac cath 5/17/18: thrombectomy for restenosis of stents-sent for urgent CABG, cath 5/14/2007: GRECIA x2 to LAD, [place] to diagonal     Gastro-oesophageal reflux disease      Heart attack (H) 2007    apparently arrested, cardiac X5     Hiatal hernia      Hypercholesterolemia      Hypertension      Ischemic cardiomyopathy      Left foot drop      Liver disease     alcoholic liver disease, alcoholic cirrohsosis, portal hypertension     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)     ?When diagnosed, patient not aware of this     Moderate mitral regurgitation      Monoclonal gammopathy      Neuropathy      Noninfectious ileitis     diverticulitis of colon     Nonrheumatic tricuspid valve regurgitation      Paroxysmal atrial fibrillation (H)      PE (pulmonary embolism) 2006    saddle emboli 2006     Prostate cancer (H) 2000    Treated with radiation (seed implants in 2000) -- no problems since     Pulmonary hypertension (H)      Renal disease     kidney stones     Syncope      Thrombocytopenia (H)      Urethral stricture      Venous thrombosis     IVC filter and lysis procedures 2007 "       PAST SURGICAL HISTORY:  Past Surgical History:   Procedure Laterality Date     AMPUTATE LEG BELOW KNEE Left 04/2014    related to gangrene, started as foot ulcer related to neuropathy     AMPUTATE LEG BELOW KNEE Left 12/4/2017    Procedure: AMPUTATE LEG BELOW KNEE;  Exploration of Left Leg Below Knee Amputation Stump with Ligation of Bleeders.;  Surgeon: Leandro Arreola MD;  Location:  OR     APPENDECTOMY       APPLY WOUND VAC Left 12/6/2017    Procedure: APPLY WOUND VAC;;  Surgeon: Saima Howard MD;  Location:  SD     ARTHROPLASTY HIP Right 11/27/2018    Procedure: RIGHT TOTAL HIP ARTHROPLASTY;  Surgeon: Saima Howard MD;  Location:  OR     ARTHROPLASTY KNEE Right 9/19/2014    Procedure: ARTHROPLASTY KNEE;  Surgeon: Saima Howard MD;  Location:  OR     CARDIAC SURGERY      CABG     CHOLECYSTECTOMY       COLONOSCOPY       COMBINED CYSTOSCOPY, RETROGRADES, EXCHANGE STENT URETER(S) Left 7/24/2018    Procedure: COMBINED CYSTOSCOPY, RETROGRADES, EXCHANGE STENT URETER(S);  CYSTOSCOPY, BILATERAL RETROGRADES, BILATERAL URETERAL STENT EXCHANGE ;  Surgeon: Matt Cervantes MD;  Location:  OR     CRANIOTOMY Right 4/7/2018    Procedure: CRANIOTOMY;  Right Craniotomy For Subdural Hematoma;  Surgeon: Jono Issa MD;  Location:  OR     CYSTOSCOPY, DILATE URETHRA, COMBINED N/A 10/12/2016    Procedure: COMBINED CYSTOSCOPY, DILATE URETHRA;  Surgeon: Dimitry Bello MD;  Location:  OR     CYSTOSCOPY, REMOVE STENT(S), COMBINED Right 7/24/2018    Procedure: COMBINED CYSTOSCOPY, REMOVE STENT(S);;  Surgeon: Jose Hunter MD;  Location:  OR     ENDOSCOPIC STRIPPING VEIN(S)       esophagogastroduodenoscopy       ESOPHAGOSCOPY, GASTROSCOPY, DUODENOSCOPY (EGD), COMBINED N/A 3/11/2019    Procedure: COMBINED ESOPHAGOSCOPY, GASTROSCOPY, DUODENOSCOPY (EGD), BIOPSY SINGLE OR MULTIPLE;  Surgeon: Katherin Boyd MD;  Location:  GI     ESOPHAGOSCOPY, GASTROSCOPY,  DUODENOSCOPY (EGD), COMBINED N/A 9/27/2019    Procedure: ESOPHAGOGASTRODUODENOSCOPY, WITH FOREIGN BODY REMOVAL;  Surgeon: Raegan Mckeon MD;  Location:  GI     EYE SURGERY      cataracts     GENITOURINARY SURGERY      Prostate Seen Implants     HERNIA REPAIR      Umbilical     INCISION AND DRAINAGE LOWER EXTREMITY, COMBINED Left 12/1/2017    Procedure: COMBINED INCISION AND DRAINAGE LOWER EXTREMITY;  INCISION AND DRAINAGE OF LEFT BELOW KNEE AMPUTATION ABSCESS, WOUND VAC PLACEMENT;  Surgeon: Saima Howard MD;  Location:  OR     IR IVC FILTER PLACEMENT       IRRIGATION AND DEBRIDEMENT LOWER EXTREMITY, COMBINED Left 12/6/2017    Procedure: COMBINED IRRIGATION AND DEBRIDEMENT LOWER EXTREMITY;  IRRIGATION AND DEBRIDEMENT OF LEFT BELOW KNEE AMPUTATION SITE WITH WOUND VAC REPLACEMENT;  Surgeon: Saima Howard MD;  Location:  SD     IRRIGATION AND DEBRIDEMENT LOWER EXTREMITY, COMBINED Left 12/8/2017    Procedure: COMBINED IRRIGATION AND DEBRIDEMENT LOWER EXTREMITY;  IRRIGATION AND DEBRIDEMENT OF LEFT BELOW THE KNEE AMPUTATION AND SHORTENING OF THE TIBIA WITH WOUND CLOSURE;  Surgeon: Saima Howard MD;  Location:  OR     LASER HOLMIUM LITHOTRIPSY URETER(S), INSERT STENT, COMBINED Bilateral 6/28/2018    Procedure: COMBINED CYSTOSCOPY, URETEROSCOPY, LASER HOLMIUM LITHOTRIPSY URETER(S), INSERT STENT;  CYSTOSCOPY, BILATERAL URETEROSCOPY,  HOLMIUM LASER LITHOTRIPSY, BILATERAL STENT PLACEMENT, STONE BASKETING;  Surgeon: Jose Hunter MD;  Location:  OR     LASER HOLMIUM LITHOTRIPSY URETER(S), INSERT STENT, COMBINED Left 8/14/2018    Procedure: COMBINED CYSTOSCOPY, URETEROSCOPY, LASER HOLMIUM LITHOTRIPSY URETER(S), INSERT STENT;  CYSTOSCOPY, LEFT URETEROSCOPY, LEFT LASER HOLMIUM LITHOTRIPSY URETER(S) REMOVAL BILATERAL STENTS;  Surgeon: Jose Hunter MD;  Location:  OR     ORTHOPEDIC SURGERY      (R) knee scope     ORTHOPEDIC SURGERY      total hip      ORTHOPEDIC SURGERY      elbow surgery        FAMILY HISTORY:  Family History   Problem Relation Age of Onset     Lung Cancer Mother      Heart Failure Father          age 87       SOCIAL HISTORY:  Social History     Socioeconomic History     Marital status:      Spouse name: Not on file     Number of children: 2     Years of education: Not on file     Highest education level: Not on file   Occupational History     Occupation: Retired    Social Needs     Financial resource strain: Not on file     Food insecurity     Worry: Not on file     Inability: Not on file     Transportation needs     Medical: Not on file     Non-medical: Not on file   Tobacco Use     Smoking status: Never Smoker     Smokeless tobacco: Never Used   Substance and Sexual Activity     Alcohol use: Yes     Comment: quit 10/2015; now abt 1-3 vodka's per night     Drug use: No     Sexual activity: Not Currently     Partners: Female   Lifestyle     Physical activity     Days per week: Not on file     Minutes per session: Not on file     Stress: Not on file   Relationships     Social connections     Talks on phone: Not on file     Gets together: Not on file     Attends Spiritism service: Not on file     Active member of club or organization: Not on file     Attends meetings of clubs or organizations: Not on file     Relationship status: Not on file     Intimate partner violence     Fear of current or ex partner: Not on file     Emotionally abused: Not on file     Physically abused: Not on file     Forced sexual activity: Not on file   Other Topics Concern     Parent/sibling w/ CABG, MI or angioplasty before 65F 55M? Not Asked   Social History Narrative    , 2 children, retired . He does not have a living will but desires full code.  (last updated 2019)        Review of Systems:  Skin:  Negative     Eyes:  Negative    ENT:  Negative    Respiratory:  Positive for dyspnea on exertion;wheezing  Cardiovascular:    Positive  "for;edema;fatigue  Gastroenterology: Negative    Genitourinary:  Negative    Musculoskeletal:  Negative    Neurologic:  Positive for numbness or tingling of hands  Psychiatric:  Negative    Heme/Lymph/Imm:  Negative    Endocrine:  Negative      Physical Exam:  Vitals: /64   Pulse 62   Ht 1.88 m (6' 2\")   Wt 108.7 kg (239 lb 11.2 oz)   SpO2 94%   BMI 30.78 kg/m     Please refer to dictation for physical exam    Recent Lab Results:  LIPID RESULTS:  No results found for: CHOL, HDL, LDL, TRIG, CHOLHDLRATIO    LIVER ENZYME RESULTS:  Lab Results   Component Value Date    AST 27 09/27/2019    ALT 10 09/27/2019       CBC RESULTS:  Lab Results   Component Value Date    WBC 4.1 03/09/2020    RBC 3.53 (L) 03/09/2020    HGB 7.7 (L) 03/09/2020    HCT 26.6 (L) 03/09/2020    MCV 75 (L) 03/09/2020    MCH 21.8 (L) 03/09/2020    MCHC 28.9 (L) 03/09/2020    RDW 16.9 (H) 03/09/2020     03/09/2020       BMP RESULTS:  Lab Results   Component Value Date     (L) 03/09/2020    POTASSIUM 3.7 03/09/2020    CHLORIDE 98 03/09/2020    CO2 26 03/09/2020    ANIONGAP 13.7 03/09/2020     (H) 03/09/2020    BUN 28 03/09/2020    CR 1.80 (H) 03/09/2020    GFRESTIMATED 37 (L) 03/09/2020    GFRESTBLACK 45 (L) 03/09/2020    BENNIE 9.3 03/09/2020        A1C RESULTS:  Lab Results   Component Value Date    A1C 4.6 05/18/2018       INR RESULTS:  Lab Results   Component Value Date    INR 2.34 (H) 02/10/2020    INR 2.05 (H) 12/30/2019           CC  No referring provider defined for this encounter.           "

## 2020-03-09 NOTE — LETTER
3/9/2020    Main Hardy MD  [address]  [facility] [address]    RE: Antonio KU Ashley       Dear Colleague,    I had the pleasure of seeing Antonio Ramirez in the [facility].    Service Date: 3/9/2020      PRIMARY CARDIOLOGIST:  Dr. Downs.      REASON FOR VISIT:  [facility].R.[facility]. Clinic follow up     HISTORY OF PRESENT ILLNESS:  Mr. Ramirez is a complex 74-year-old gentleman seen today at the request of Dr. Ovalle for [facility].R.E[facility] Clinic enrollment to see if we can prevent hospitalizations.  His past medical history is significant for the followin.  Alcoholic cirrhosis with scheduled standing paracentesis every 2 weeks, with the last several weeks taking off about 7 liters each, starting in 2016.   2.  Alcohol dependency with ongoing alcohol 3-4 drinks per night in spite of recommendations otherwise.   3.  Coronary artery disease with history of MI and CAB.   4.  Ischemic cardiomyopathy with EF 40%-45% and chronic systolic heart failure.   5.  Severe pulmonary hypertension with elevated pulmonary pressures, likely multifactorial due to liver failure, hypoproteinemia and heart failure, with the patient declining further PH workup by Dr. Ovalle.   6.  Persistent AFib, on anticoagulation.   7.  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   8.  History of large-cell B-cell non-Hodgkin's lymphoma.   9.  CKD.   10.  Chronic anemia and thrombocytopenia.   11.  History of PEA and possibly VT arrest in 2019.   12.  History of PE and antiphospholipid antibody syndrome with history of IVC filter placement in , on chronic anticoagulation.   13.  Mild to moderate mitral regurg, mild mitral stenosis, mild tricuspid regurgitation.   14.  Hx of prostate ca with radioactive seed placement, in remission      I met the patient in early January when he had been referred to Dr. Ovalle for referral for CORE involvement.  At that  point it was very clear that he was hypervolemic but his breathing was okay with the exception of when he needed a paracentesis.  Once that is complete his breathing tended to improve.  At my visit we set him up for an infusion clinic.  He even with IV diuretics he did not really diurese.     At our last visit, I increased his torsemide from 40 mg a day to 60 mg a day.  We kept his spironolactone and 50 mg a day.  We asked him to continue his daily weights and call if he had issues.  He tells me that not long after that he started getting charley horses.  These were in his calves in the middle of the night also on his forearms and hands.  They are quite miserable.  He decided that those were likely from the Spironolactone and torsemide so he quit them both.  He has been off them for about 2 weeks.  He also stopped weighing himself at that time.  He quit wearing his wraps on his legs as well.  He does not know if his weight went up but he does note that his right leg became more swollen and he is more short of breath when he moves throughout his house.  He does get winded going from the family room to the kitchen or the bathroom although he says this is only slightly more than before.  His left prosthetic still fits well.  He noted a decrease in urine output.  He denies orthopnea or PND.  He did have some sort of viral illness but this has seemed to resolve.  He has not had any syncope or presyncope.  He has not noted any black tarry stools or bright red blood in his stools.  He did have emesis the other day as he was had an upset stomach but this look like typical emesis with food and not like coffee ground.       SOCIAL HISTORY:  He is , wife, Helena.  They have started working on lowering his sodium and increasing the protein in his diet.  He still uses a saltshaker although rarely.  At least, he is starting to think in that direction.  He drinks 3-4 drinks a day now or previously drink for about 6 in the  evening to 10 in the evening.  He has no interest in quitting..  He is a lifelong nonsmoker.  He relays to me today that his son went to [facility].     PHYSICAL EXAMINATION:   GENERAL:  Exam reveals an elderly, frail, irritable gentleman in no acute distress.    HEENT:  He is normocephalic and atraumatic.   HEART: Regular in rate and rhythm with a 3/6 systolic murmur heard best at the left sternal border.  I do not appreciate JVP today at 90 degrees.  LUNGS: Significantly diminished bilaterally.  I do not appreciate wheezing rales or rhonchi.  EXTREMITIES:  He has 3+ pitting edema in his right leg up into his thigh.  Stump has 3+ pitting edema up into his thigh.   ABDOMEN:  Distended consistent with ascites.      ASSESSMENT AND PLAN:  This is a complex 74-year-old gentleman with alcoholic cirrhosis with biweekly paracenteses, alcohol dependence, low protein levels, history of coronary artery disease and mild cardiomyopathy, pulmonary hypertension likely secondary to all of the above, CKD, chronic thrombocytopenia and anemia who I recently met in the core clinic.  Unfortunately labs today show him significantly anemic with a hemoglobin of 7.7, as well as low iron studies.  Also unfortunately, he has been noncompliant and stopped taking his medications without letting us know.  Very mehran discussion today.  I told him I be happy to work with him and adjust things if he has adverse effects from his meds but it is difficult for me to care for him if he just stops and does not let us know once going on.  I think he is clearly hypervolemic today and certainly his anemia is playing a large part of it.  I also spoke suspect that he may have bilateral pleural effusions.  Looking back through his chart and CTs even at [facility] he has had these for several years.  Given that I am not sure if we did thoracenteses on these if they would resolve or if he now has trapped lungs.  Regardless, we will attempt more aggressive diuresis.   We will get him back on some p.o. diuretic will do torsemide at 40 mg a day and spironolactone 50 mg a day.  We will have him come in Wednesday for infusion clinic.  Our infusion clinic plan will be as follows:  1.  40 mg IV Lasix bolus  2.  500 mg IV Diuril  3.  20 mg of Lasix per hour for 4 hours.  4.  IV iron in the form of either Saturnino for or Injectafer what ever is improved by the hospital  5.  Chest x-ray PA and bilateral.    He assures me that he will be compliant in the next several weeks although I think that is probably unlikely.  He will get his regular slightly scheduled paracentesis today.  I will see him back in about 3 to 4 weeks.  If his diuresis in infusion clinic is successful this is something we could consider to schedule weekly.  We will see how he does this week and go from there.  We did consider an anemia work-up, but at this point the patient is barely compliant enough to just take his meds and will start with that as a goal.    We also discussed hospice as an option, the patient states he is not interested in that even though it would mean him getting to do what ever he would like.  We did review that he has multisystem organ failure including renal failure, heart failure and liver failure.    Thank you for allowing me to participate in this patient's care.      Juli Smith PA-C     More than 50 minutes was spent with this patient, more than 50% including counseling of his complex heart condition, coordination of care, and review of his complex situation and our approach going forward.    HPI and Plan:   See dictation    Orders this Visit:  Orders Placed This Encounter   Procedures     Basic metabolic panel     Hemoglobin     Follow-Up with [facility] Clinic - NANCY visit     Follow-Up with Cardiology Diuretic Infusion Care     Orders Placed This Encounter   Medications     torsemide (DEMADEX) 20 MG tablet     Sig: Take 2 tablets (40 mg) by mouth every morning     Dispense:  180 tablet     Refill:  " 3     Medications Discontinued During This Encounter   Medication Reason     torsemide (DEMADEX) 20 MG tablet          Encounter Diagnosis   Name Primary?     Chronic systolic congestive heart failure (H)        CURRENT MEDICATIONS:  No current outpatient medications on file.       ALLERGIES     Allergies   Allergen Reactions     Kdc:Minocycline+Beatty Blue Fcf GI Disturbance     11/01/16-PT IS NOT AWARE OF THIS REACTION     Ciprofloxacin Itching     Severe itching \"like ants were crawling\"     Hmg-Coa-R Inhibitors Other (See Comments)     Rhabdomyolysis occurred within a couple days  Rhabdomyolysis       PAST MEDICAL HISTORY:  Past Medical History:   Diagnosis Date     Alcoholism (H)      Amputated left leg (H)      Anemia      Anticardiolipin antibody syndrome (H)      Antiphospholipid antibody syndrome (H)      Antiplatelet or antithrombotic long-term use      Aortic root dilatation (H)      Aortic stenosis      Arthritis      Balance problem      Coronary artery disease     CABG 2007: SVG to LAD, SVG to diagonal, SVG to OM; cardiac cath 5/17/18: thrombectomy for restenosis of stents-sent for urgent CABG, cath 5/14/2007: GRECIA x2 to LAD, Grecia to diagonal     Gastro-oesophageal reflux disease      Heart attack (H) 2007    apparently arrested, cardiac X5     Hiatal hernia      Hypercholesterolemia      Hypertension      Ischemic cardiomyopathy      Left foot drop      Liver disease     alcoholic liver disease, alcoholic cirrohsosis, portal hypertension     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)     ?When diagnosed, patient not aware of this     Moderate mitral regurgitation      Monoclonal gammopathy      Neuropathy      Noninfectious ileitis     diverticulitis of colon     Nonrheumatic tricuspid valve regurgitation      Paroxysmal atrial fibrillation (H)      PE (pulmonary embolism) 2006    saddle emboli 2006     Prostate cancer (H) 2000    Treated with radiation (seed implants in 2000) -- no problems " since     Pulmonary hypertension (H)      Renal disease     kidney stones     Syncope      Thrombocytopenia (H)      Urethral stricture      Venous thrombosis     IVC filter and lysis procedures 2007       PAST SURGICAL HISTORY:  Past Surgical History:   Procedure Laterality Date     AMPUTATE LEG BELOW KNEE Left 04/2014    related to gangrene, started as foot ulcer related to neuropathy     AMPUTATE LEG BELOW KNEE Left 12/4/2017    Procedure: AMPUTATE LEG BELOW KNEE;  Exploration of Left Leg Below Knee Amputation Stump with Ligation of Bleeders.;  Surgeon: Leandro Arreola MD;  Location:  OR     APPENDECTOMY       APPLY WOUND VAC Left 12/6/2017    Procedure: APPLY WOUND VAC;;  Surgeon: Saima Howard MD;  Location:  SD     ARTHROPLASTY HIP Right 11/27/2018    Procedure: RIGHT TOTAL HIP ARTHROPLASTY;  Surgeon: Saima Howard MD;  Location:  OR     ARTHROPLASTY KNEE Right 9/19/2014    Procedure: ARTHROPLASTY KNEE;  Surgeon: Saima Howard MD;  Location:  OR     CARDIAC SURGERY      CABG     CHOLECYSTECTOMY       COLONOSCOPY       COMBINED CYSTOSCOPY, RETROGRADES, EXCHANGE STENT URETER(S) Left 7/24/2018    Procedure: COMBINED CYSTOSCOPY, RETROGRADES, EXCHANGE STENT URETER(S);  CYSTOSCOPY, BILATERAL RETROGRADES, BILATERAL URETERAL STENT EXCHANGE ;  Surgeon: Matt Cervantes MD;  Location:  OR     CRANIOTOMY Right 4/7/2018    Procedure: CRANIOTOMY;  Right Craniotomy For Subdural Hematoma;  Surgeon: Jono Issa MD;  Location:  OR     CYSTOSCOPY, DILATE URETHRA, COMBINED N/A 10/12/2016    Procedure: COMBINED CYSTOSCOPY, DILATE URETHRA;  Surgeon: Dimitry Bello MD;  Location:  OR     CYSTOSCOPY, REMOVE STENT(S), COMBINED Right 7/24/2018    Procedure: COMBINED CYSTOSCOPY, REMOVE STENT(S);;  Surgeon: Jose Hunter MD;  Location:  OR     ENDOSCOPIC STRIPPING VEIN(S)       esophagogastroduodenoscopy       ESOPHAGOSCOPY, GASTROSCOPY, DUODENOSCOPY (EGD), COMBINED  N/A 3/11/2019    Procedure: COMBINED ESOPHAGOSCOPY, GASTROSCOPY, DUODENOSCOPY (EGD), BIOPSY SINGLE OR MULTIPLE;  Surgeon: Katherin Boyd MD;  Location:  GI     ESOPHAGOSCOPY, GASTROSCOPY, DUODENOSCOPY (EGD), COMBINED N/A 9/27/2019    Procedure: ESOPHAGOGASTRODUODENOSCOPY, WITH FOREIGN BODY REMOVAL;  Surgeon: Raegan Mckeon MD;  Location:  GI     EYE SURGERY      cataracts     GENITOURINARY SURGERY      Prostate Seen Implants     HERNIA REPAIR      Umbilical     INCISION AND DRAINAGE LOWER EXTREMITY, COMBINED Left 12/1/2017    Procedure: COMBINED INCISION AND DRAINAGE LOWER EXTREMITY;  INCISION AND DRAINAGE OF LEFT BELOW KNEE AMPUTATION ABSCESS, WOUND VAC PLACEMENT;  Surgeon: Saima Howard MD;  Location:  OR     IR IVC FILTER PLACEMENT       IRRIGATION AND DEBRIDEMENT LOWER EXTREMITY, COMBINED Left 12/6/2017    Procedure: COMBINED IRRIGATION AND DEBRIDEMENT LOWER EXTREMITY;  IRRIGATION AND DEBRIDEMENT OF LEFT BELOW KNEE AMPUTATION SITE WITH WOUND VAC REPLACEMENT;  Surgeon: Saima Howard MD;  Location:  SD     IRRIGATION AND DEBRIDEMENT LOWER EXTREMITY, COMBINED Left 12/8/2017    Procedure: COMBINED IRRIGATION AND DEBRIDEMENT LOWER EXTREMITY;  IRRIGATION AND DEBRIDEMENT OF LEFT BELOW THE KNEE AMPUTATION AND SHORTENING OF THE TIBIA WITH WOUND CLOSURE;  Surgeon: Saima Howard MD;  Location:  OR     LASER HOLMIUM LITHOTRIPSY URETER(S), INSERT STENT, COMBINED Bilateral 6/28/2018    Procedure: COMBINED CYSTOSCOPY, URETEROSCOPY, LASER HOLMIUM LITHOTRIPSY URETER(S), INSERT STENT;  CYSTOSCOPY, BILATERAL URETEROSCOPY,  HOLMIUM LASER LITHOTRIPSY, BILATERAL STENT PLACEMENT, STONE BASKETING;  Surgeon: Jose Hunter MD;  Location:  OR     LASER HOLMIUM LITHOTRIPSY URETER(S), INSERT STENT, COMBINED Left 8/14/2018    Procedure: COMBINED CYSTOSCOPY, URETEROSCOPY, LASER HOLMIUM LITHOTRIPSY URETER(S), INSERT STENT;  CYSTOSCOPY, LEFT URETEROSCOPY, LEFT LASER HOLMIUM LITHOTRIPSY URETER(S)  REMOVAL BILATERAL STENTS;  Surgeon: Jose Hunter MD;  Location:  OR     ORTHOPEDIC SURGERY      (R) knee scope     ORTHOPEDIC SURGERY      total hip      ORTHOPEDIC SURGERY      elbow surgery       FAMILY HISTORY:  Family History   Problem Relation Age of Onset     Lung Cancer Mother      Heart Failure Father          age 87       SOCIAL HISTORY:  Social History     Socioeconomic History     Marital status:      Spouse name: Not on file     Number of children: 2     Years of education: Not on file     Highest education level: Not on file   Occupational History     Occupation: Retired    Social Needs     Financial resource strain: Not on file     Food insecurity     Worry: Not on file     Inability: Not on file     Transportation needs     Medical: Not on file     Non-medical: Not on file   Tobacco Use     Smoking status: Never Smoker     Smokeless tobacco: Never Used   Substance and Sexual Activity     Alcohol use: Yes     Comment: quit 10/2015; now abt 1-3 vodka's per night     Drug use: No     Sexual activity: Not Currently     Partners: Female   Lifestyle     Physical activity     Days per week: Not on file     Minutes per session: Not on file     Stress: Not on file   Relationships     Social connections     Talks on phone: Not on file     Gets together: Not on file     Attends [employer] service: Not on file     Active member of club or organization: Not on file     Attends meetings of clubs or organizations: Not on file     Relationship status: Not on file     Intimate partner violence     Fear of current or ex partner: Not on file     Emotionally abused: Not on file     Physically abused: Not on file     Forced sexual activity: Not on file   Other Topics Concern     Parent/sibling w/ CABG, MI or angioplasty before 65F 55M? Not Asked   Social History Narrative    , 2 children, retired . He does not have a living will but desires full code.  (last updated  "2/28/2019)        Review of Systems:  Skin:  Negative     Eyes:  Negative    ENT:  Negative    Respiratory:  Positive for dyspnea on exertion;wheezing  Cardiovascular:    Positive for;edema;fatigue  Gastroenterology: Negative    Genitourinary:  Negative    Musculoskeletal:  Negative    Neurologic:  Positive for numbness or tingling of hands  Psychiatric:  Negative    Heme/Lymph/Imm:  Negative    Endocrine:  Negative      Physical Exam:  Vitals: /64   Pulse 62   Ht 1.88 m (6' 2\")   Wt 108.7 kg (239 lb 11.2 oz)   SpO2 94%   BMI 30.78 kg/m     Please refer to dictation for physical exam    Recent Lab Results:  LIPID RESULTS:  No results found for: CHOL, HDL, LDL, TRIG, CHOLHDLRATIO    LIVER ENZYME RESULTS:  Lab Results   Component Value Date    AST 27 09/27/2019    ALT 10 09/27/2019       CBC RESULTS:  Lab Results   Component Value Date    WBC 4.1 03/09/2020    RBC 3.53 (L) 03/09/2020    HGB 7.7 (L) 03/09/2020    HCT 26.6 (L) 03/09/2020    MCV 75 (L) 03/09/2020    MCH 21.8 (L) 03/09/2020    MCHC 28.9 (L) 03/09/2020    RDW 16.9 (H) 03/09/2020     03/09/2020       BMP RESULTS:  Lab Results   Component Value Date     (L) 03/09/2020    POTASSIUM 3.7 03/09/2020    CHLORIDE 98 03/09/2020    CO2 26 03/09/2020    ANIONGAP 13.7 03/09/2020     (H) 03/09/2020    BUN 28 03/09/2020    CR 1.80 (H) 03/09/2020    GFRESTIMATED 37 (L) 03/09/2020    GFRESTBLACK 45 (L) 03/09/2020    BENNIE 9.3 03/09/2020        A1C RESULTS:  Lab Results   Component Value Date    A1C 4.6 05/18/2018       INR RESULTS:  Lab Results   Component Value Date    INR 2.34 (H) 02/10/2020    INR 2.05 (H) 12/30/2019         Thank you for allowing me to participate in the care of your patient.    Sincerely,     Juli Smith PAIndianaC     [facility]    "

## 2020-03-09 NOTE — NURSING NOTE
"[facility]    Met with Antonio and his wife, Helena, after their visit with Juli Smith PA-C.     Antonio will receive IV diuretics and IV iron in the infusion clinic on 3/11/20. I reviewed instructions with them today. Of note, he has not been checking his weight at home, but agrees to start. Will call later this week for post-infusion update. Reminder to RN board.     Return appointment:   Future Appointments   Date Time Provider Department Center   3/11/2020 10:30 AM [facility] SUITES BED 1 [facility]   3/18/2020  3:00 PM [facility]   3/18/2020  3:50 PM Lacy Pavon APRN CNP [facility]       Medication changes:   Re-start Torsemide at 40 mg daily. Hold this on 3/11/20 (infusion clinic).  Re-Start spironolactone at 50 mg once per day.   Call CORE RN if experiencing recurrent cramping. Explained that this may indicate a need for labs. Antonio initially declined to call (\"I'll just eat a banana\") but after thorough discussion he reluctantly agreed.    Labs: Lab results from today were reviewed with the patient during the office visit. A copy of the results were provided on the After Visit Summary.       ROSY PandeyN, RN, PHN, HNB-BC  Care Coordinator - General Cardiology and CORE   [phone]      "

## 2020-03-09 NOTE — DISCHARGE INSTRUCTIONS

## 2020-03-09 NOTE — RESULT ENCOUNTER NOTE
Will review or did review during clinic visit.  Please see progress note for plan.  Juli Smith PA-C 3/9/2020 3:25 PM

## 2020-03-09 NOTE — PATIENT INSTRUCTIONS
Call CORE nurse for any questions or concerns Mon-Fri 8am-4pm:                                                #[phone]                                       For concerns after hours:                                               #[phone]     1: Medication changes: start taking spironolactone 50 mg once a day.    Start torsemide again at 40 mg once a day.  If you get mohan horses, please call and we'll adjust medications.    Try a small glass of gatorade before bed.   Don't increase your pop intake at all.      2: Plan from today: we'll set you up for an infusion on Wednesday to see if we can get more fluid off.  We'll also give you IV iron that day.    I'll see you back in about 1 month with labs.      3: Lab results: see attached;   Component      Latest Ref Rng & Units 3/9/2020   Sodium      136 - 145 mmol/L 134 (L)   Potassium      3.5 - 5.1 mmol/L 3.7   Chloride      98 - 107 mmol/L 98   Carbon Dioxide      23 - 29 mmol/L 26   Anion Gap      6 - 17 mmol/L 13.7   Glucose      70 - 105 mg/dL 106 (H)   Urea Nitrogen      7 - 30 mg/dL 28   Creatinine      0.70 - 1.30 mg/dL 1.80 (H)   GFR Estimate      >60 mL/min/1.73:m2 37 (L)   GFR Estimate If Black      >60 mL/min/1.73:m2 45 (L)   Calcium      8.5 - 10.5 mg/dL 9.3   WBC      4.0 - 11.0 10e9/L 4.1   RBC Count      4.4 - 5.9 10e12/L 3.53 (L)   Hemoglobin      13.3 - 17.7 g/dL 7.7 (L)   Hematocrit      40.0 - 53.0 % 26.6 (L)   MCV      78 - 100 fl 75 (L)   MCH      26.5 - 33.0 pg 21.8 (L)   MCHC      31.5 - 36.5 g/dL 28.9 (L)   RDW      10.0 - 15.0 % 16.9 (H)   Platelet Count      150 - 450 10e9/L 184   Iron      35 - 180 ug/dL 22 (L)   Iron Binding Cap      240 - 430 ug/dL 341   Iron Saturation Index      15 - 46 % 6 (L)   % Retic      0.5 - 2.0 % 2.2 (H)   Absolute Retic      25 - 95 10e9/L 76.2   N-Terminal Pro Bnp      0 - 450 pg/mL 2,044 (H)   Ferritin      26 - 388 ng/mL 104

## 2020-03-09 NOTE — PROGRESS NOTES
Care Suites Procedure Nursing Note    Patient Information  Name: Antonio Ramirez  Age: 74 year old    Procedure  Procedure: Paracentesis  Procedure start time: 1420  Procedure complete time: 1452  Concerns/abnormal assessment: Patient was monitored with BP and O2 sats. Patient tolerated well. VSS. 4850 cc dalia fluid removed from abdomen w/o difficulty. Bandaid applied to site. Dermabond used. 1455- Pt taken back to CS 9 per cart.  If abnormal assessment, provider notified: N/A  Plan/Other: recovery in Care Suites then discharge to home.    Rekha Garcia RN

## 2020-03-09 NOTE — PROGRESS NOTES
Care Suites Admission Nursing Note    Patient Information  Name: Antonio Ramirez  Age: 74 year old  Reason for admission: Paracentesis  Care Suites arrival time: 1300    Patient Admission/Assessment   Pre-procedure assessment complete: Yes  If abnormal assessment/labs, provider notified: N/A  NPO: N/A  Medications held per instructions/orders: Yes  Consent: obtained  If applicable, pregnancy test status: n/a  Patient oriented to room: Yes  Education/questions answered: Yes  Plan/other: proceed    Discharge Planning  Accompanied by: self, spouse will pick pt up later on today  Overnight post sedation caregiver: n/a  Discharge location: home    Von Del Castillo RN

## 2020-03-09 NOTE — PROCEDURES
[facility]    Procedure: US para    Date/Time: 3/9/2020 3:31 PM  Performed by: Patricio Dye MD  Authorized by: Patricio Dye MD     UNIVERSAL PROTOCOL   Site Marked: NA  Prior Images Obtained and Reviewed:  Yes  Required items: Required blood products, implants, devices and special equipment available    Patient identity confirmed:  Verbally with patient, arm band, provided demographic data and hospital-assigned identification number  Patient was reevaluated immediately before administering moderate or deep sedation or anesthesia  Confirmation Checklist:  Patient's identity using two indicators, relevant allergies, procedure was appropriate and matched the consent or emergent situation and correct equipment/implants were available  Time out: Immediately prior to the procedure a time out was called    Universal Protocol: the Joint Commission Universal Protocol was followed    Preparation: Patient was prepped and draped in usual sterile fashion    ESBL (mL):  3         ANESTHESIA    Anesthesia: Local infiltration  Local Anesthetic:  Lidocaine 1% without epinephrine      SEDATION    Patient Sedated: No    See dictated procedure note for full details.  Findings: US para LLQ    Specimens: none    Complications: None    Condition: Stable    PROCEDURE   Patient Tolerance:  Patient tolerated the procedure well with no immediate complications    Length of time physician/provider present for 1:1 monitoring during sedation: 0

## 2020-03-09 NOTE — PROGRESS NOTES
[facility]    Received VM from Claudine in regards to pt - would like to clarify today's appointment time.    Called back and left VM stating that labs are at 1000 today, OV with Juli is at 1050. Gave instructions to call scheduling if further questions.    ROSY PandeyN, RN, PHN, HNB-BC   3/9/2020 at 8:53 AM

## 2020-03-11 ENCOUNTER — APPOINTMENT (OUTPATIENT)
Dept: GENERAL RADIOLOGY | Facility: CLINIC | Age: 75
End: 2020-03-11
Attending: PHYSICIAN ASSISTANT
Payer: MEDICARE

## 2020-03-11 ENCOUNTER — HOSPITAL ENCOUNTER (OUTPATIENT)
Facility: CLINIC | Age: 75
Discharge: HOME | End: 2020-03-11
Attending: INTERNAL MEDICINE | Admitting: INTERNAL MEDICINE
Payer: MEDICARE

## 2020-03-11 ENCOUNTER — CARE COORDINATION (OUTPATIENT)
Dept: CARDIOLOGY | Facility: CLINIC | Age: 75
End: 2020-03-11

## 2020-03-11 ENCOUNTER — APPOINTMENT (OUTPATIENT)
Dept: INFUSION THERAPY | Facility: CLINIC | Age: 75
End: 2020-03-11
Payer: MEDICARE

## 2020-03-11 VITALS
TEMPERATURE: 96.1 F | OXYGEN SATURATION: 98 % | HEART RATE: 70 BPM | SYSTOLIC BLOOD PRESSURE: 117 MMHG | DIASTOLIC BLOOD PRESSURE: 48 MMHG | WEIGHT: 228.84 LBS | BODY MASS INDEX: 29.38 KG/M2 | RESPIRATION RATE: 18 BRPM

## 2020-03-11 DIAGNOSIS — I50.22 CHRONIC SYSTOLIC CONGESTIVE HEART FAILURE (H): Primary | ICD-10-CM

## 2020-03-11 LAB
BUN SERPL-MCNC: 25 MG/DL (ref 7–30)
BUN SERPL-MCNC: 26 MG/DL (ref 7–30)
CREAT SERPL-MCNC: 1.73 MG/DL (ref 0.66–1.25)
CREAT SERPL-MCNC: 1.77 MG/DL (ref 0.66–1.25)
GFR SERPL CREATININE-BSD FRML MDRD: 36 ML/MIN/{1.73_M2}
GFR SERPL CREATININE-BSD FRML MDRD: 37 ML/MIN/{1.73_M2}
MAGNESIUM SERPL-MCNC: 2 MG/DL (ref 1.6–2.3)
MAGNESIUM SERPL-MCNC: 2.1 MG/DL (ref 1.6–2.3)
POTASSIUM SERPL-SCNC: 3.9 MMOL/L (ref 3.4–5.3)
POTASSIUM SERPL-SCNC: 4.1 MMOL/L (ref 3.4–5.3)
SODIUM SERPL-SCNC: 138 MMOL/L (ref 133–144)

## 2020-03-11 PROCEDURE — 82565 ASSAY OF CREATININE: CPT | Mod: 91 | Performed by: PHYSICIAN ASSISTANT

## 2020-03-11 PROCEDURE — 82565 ASSAY OF CREATININE: CPT | Performed by: INTERNAL MEDICINE

## 2020-03-11 PROCEDURE — 84520 ASSAY OF UREA NITROGEN: CPT | Performed by: PHYSICIAN ASSISTANT

## 2020-03-11 PROCEDURE — 36415 COLL VENOUS BLD VENIPUNCTURE: CPT

## 2020-03-11 PROCEDURE — 99214 OFFICE O/P EST MOD 30 MIN: CPT | Performed by: NURSE PRACTITIONER

## 2020-03-11 PROCEDURE — 84520 ASSAY OF UREA NITROGEN: CPT | Mod: 91 | Performed by: INTERNAL MEDICINE

## 2020-03-11 PROCEDURE — 71046 X-RAY EXAM CHEST 2 VIEWS: CPT

## 2020-03-11 PROCEDURE — 25000128 H RX IP 250 OP 636: Performed by: PHYSICIAN ASSISTANT

## 2020-03-11 PROCEDURE — 84295 ASSAY OF SERUM SODIUM: CPT | Performed by: PHYSICIAN ASSISTANT

## 2020-03-11 PROCEDURE — 25000132 ZZH RX MED GY IP 250 OP 250 PS 637: Mod: GY | Performed by: PHYSICIAN ASSISTANT

## 2020-03-11 PROCEDURE — 96366 THER/PROPH/DIAG IV INF ADDON: CPT

## 2020-03-11 PROCEDURE — 84132 ASSAY OF SERUM POTASSIUM: CPT | Performed by: PHYSICIAN ASSISTANT

## 2020-03-11 PROCEDURE — 96365 THER/PROPH/DIAG IV INF INIT: CPT

## 2020-03-11 PROCEDURE — 83735 ASSAY OF MAGNESIUM: CPT | Mod: 91 | Performed by: INTERNAL MEDICINE

## 2020-03-11 PROCEDURE — 83735 ASSAY OF MAGNESIUM: CPT | Performed by: PHYSICIAN ASSISTANT

## 2020-03-11 PROCEDURE — 84132 ASSAY OF SERUM POTASSIUM: CPT | Performed by: INTERNAL MEDICINE

## 2020-03-11 PROCEDURE — 36415 COLL VENOUS BLD VENIPUNCTURE: CPT | Performed by: INTERNAL MEDICINE

## 2020-03-11 PROCEDURE — 96374 THER/PROPH/DIAG INJ IV PUSH: CPT

## 2020-03-11 PROCEDURE — 25800030 ZZH RX IP 258 OP 636: Performed by: PHYSICIAN ASSISTANT

## 2020-03-11 RX ORDER — POTASSIUM CHLORIDE 29.8 MG/ML
20 INJECTION INTRAVENOUS
Status: DISCONTINUED | OUTPATIENT
Start: 2020-03-11 | End: 2020-03-11 | Stop reason: HOSPADM

## 2020-03-11 RX ORDER — POTASSIUM CHLORIDE 1.5 G/1.58G
20-40 POWDER, FOR SOLUTION ORAL
Status: DISCONTINUED | OUTPATIENT
Start: 2020-03-11 | End: 2020-03-11 | Stop reason: HOSPADM

## 2020-03-11 RX ORDER — MAGNESIUM SULFATE HEPTAHYDRATE 40 MG/ML
4 INJECTION, SOLUTION INTRAVENOUS EVERY 4 HOURS PRN
Status: DISCONTINUED | OUTPATIENT
Start: 2020-03-11 | End: 2020-03-11 | Stop reason: HOSPADM

## 2020-03-11 RX ORDER — POTASSIUM CL/LIDO/0.9 % NACL 10MEQ/0.1L
10 INTRAVENOUS SOLUTION, PIGGYBACK (ML) INTRAVENOUS
Status: DISCONTINUED | OUTPATIENT
Start: 2020-03-11 | End: 2020-03-11 | Stop reason: HOSPADM

## 2020-03-11 RX ORDER — LIDOCAINE 40 MG/G
CREAM TOPICAL
Status: DISCONTINUED | OUTPATIENT
Start: 2020-03-11 | End: 2020-03-11 | Stop reason: HOSPADM

## 2020-03-11 RX ORDER — FUROSEMIDE 10 MG/ML
40 INJECTION INTRAMUSCULAR; INTRAVENOUS ONCE
Status: COMPLETED | OUTPATIENT
Start: 2020-03-11 | End: 2020-03-11

## 2020-03-11 RX ORDER — POTASSIUM CHLORIDE 1500 MG/1
20-40 TABLET, EXTENDED RELEASE ORAL
Status: DISCONTINUED | OUTPATIENT
Start: 2020-03-11 | End: 2020-03-11 | Stop reason: HOSPADM

## 2020-03-11 RX ORDER — POTASSIUM CHLORIDE 7.45 MG/ML
10 INJECTION INTRAVENOUS
Status: DISCONTINUED | OUTPATIENT
Start: 2020-03-11 | End: 2020-03-11 | Stop reason: HOSPADM

## 2020-03-11 RX ADMIN — FUROSEMIDE 40 MG: 10 INJECTION, SOLUTION INTRAVENOUS at 11:20

## 2020-03-11 RX ADMIN — POTASSIUM CHLORIDE 20 MEQ: 1500 TABLET, EXTENDED RELEASE ORAL at 15:28

## 2020-03-11 RX ADMIN — CHLOROTHIAZIDE SODIUM 500 MG: 500 INJECTION, POWDER, LYOPHILIZED, FOR SOLUTION INTRAVENOUS at 15:38

## 2020-03-11 RX ADMIN — FUROSEMIDE 20 MG/HR: 10 INJECTION, SOLUTION INTRAVENOUS at 11:37

## 2020-03-11 NOTE — PROGRESS NOTES
Care Suites Admission Nursing Note    Patient Information  Name: Antonio Ramirez  Age: 74 year old  Reason for admission: CHF infusion Lasix  Care Suites arrival time: 1030    Patient Admission/Assessment   Pre-procedure assessment complete: Yes  If abnormal assessment/labs, provider notified: N/A  NPO: N/A  Medications held per instructions/orders: N/A  Consent: n/a  If applicable, pregnancy test status: n/a  Patient oriented to room: Yes  Education/questions answered: Yes  Plan/other: proceed    Discharge Planning  Accompanied by: self  Discharge name/phone number: [phone]  Overnight post sedation caregiver: n/a  Discharge location: home    Von Del Castillo RN

## 2020-03-11 NOTE — PROGRESS NOTES
Care Suites Discharge Nursing Note    Patient Information  Name: Antonio Ramirez  Age: 74 year old    Discharge Education:  Discharge instructions reviewed: Yes, AVS reviewed and given at discharge.  Patient/patient representative verbalizes understanding: Yes  Patient discharging on new medications: No  Medication education completed: No changes to meds  IV dc'd.  Potassium 3.9, replaced with po.    Discharge Plans:   Discharge location: Care Suites to home per w/c.  Discharge ride contacted: yes, spouse to   Approximate discharge time: 1610    Discharge Criteria:  Discharge criteria met and vital signs stable: yes  No change in weight from check in this morning.    Patient Belongs:  Patient belongings returned to patient: yes    Jesica Pierre RN

## 2020-03-11 NOTE — PROGRESS NOTES
[facility]    Lacy Pavon APRN CNP Ortman, Elinor E, RN               Mesha,   Please set up iron infusion for this week.   Thx guillermina      Iron infusion ordered. Pt will need to call oncology office to schedule. Will call pt tomorrow to assist - reminder to RN board.    MARCEL Pandey, RN, PHN, HNB-BC   3/11/2020 at 3:55 PM

## 2020-03-11 NOTE — PROGRESS NOTES
CORE DIURETIC INFUSION VISIT  Antonio Ramirez  : 1945    Date: 3/11/2020  Primary Cardiologist: Juli MCDANIEL       HISTORY OF PRESENT ILLNESS:  Mr. Ramirez is a complex 74-year-old gentleman follows with Juli Smith, Dr. Ovalle and Dr. Downs. His past medical history is significant for the followin.  Alcoholic cirrhosis with scheduled standing paracentesis every 2 weeks, with the last several weeks taking off about 7 liters each, starting in 2016.   2.  Alcohol dependency with ongoing alcohol 3-4 drinks per night in spite of recommendations otherwise.   3.  Coronary artery disease with history of MI and CAB.   4.  Ischemic cardiomyopathy with EF 40%-45% and chronic systolic heart failure.   5.  Severe pulmonary hypertension with elevated pulmonary pressures, likely multifactorial due to liver failure, hypoproteinemia and heart failure, with the patient declining further PH workup by Dr. Ovalle.   6.  Persistent AFib, on anticoagulation.   7.  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   8.  History of large-cell B-cell non-Hodgkin's lymphoma.   9.  CKD.   10.  Chronic anemia and thrombocytopenia.   11.  History of PEA and possibly VT arrest in 2019.   12.  History of PE and antiphospholipid antibody syndrome with history of IVC filter placement in , on chronic anticoagulation.   13.  Mild to moderate mitral regurg, mild mitral stenosis, mild tricuspid regurgitation.   14.  Hx of prostate ca with radioactive seed placement, in remission         Torsemide was recently increased from 40 mg a day to 60 mg a day.  Spironolactone was continued at 50 mg a day.    He developed muscle cramps and the patient stopped both medications for 2 weeks.    He also stopped weighing himself at that time.  He quit wearing his wraps on his legs as well, and has developed shortness of breath.      Today he presents to Diuretic infusion clinic for the  followin.  40 mg IV Lasix bolus  2.  500 mg IV Diuril  3.  20 mg of Lasix per hour for 4 hours.  4.  IV iron in the form of either Saturnino for or Injectafer what ever is improved by the hospital, ( not able to infuse today due to PA needed)   5.  Chest x-ray PA and bilateral.       SOCIAL HISTORY:  He is , wife, [place].  They have started working on lowering his sodium and increasing the protein in his diet.  He still uses a saltshaker although rarely.  At least, he is starting to think in that direction.  He drinks 3-4 drinks a day now or previously drink for about 6 in the evening to 10 in the evening.  He has no interest in quitting..  He is a lifelong nonsmoker.  He relays to me today that his son went to [facility].       Physical Exam:  Vitals: BP 97/60 (BP Location: Right arm)   Pulse 73   Temp 96.1  F (35.6  C) (Oral)   Resp 18   Wt 103.9 kg (229 lb)   SpO2 98%   BMI 29.40 kg/m       GENERAL:  Exam reveals an elderly, frail, irritable gentleman in no acute distress.    HEENT:  He is normocephalic and atraumatic.   HEART: Regular in rate and rhythm with a 3/6 systolic murmur heard best at the left sternal border.  JVP today   LUNGS: Significantly diminished bilaterally.    EXTREMITIES:  He has 3+ pitting edema in his right leg up into his thigh.  Stump has 3+ pitting edema up into his thigh.   ABDOMEN:  Distended consistent with ascites.    ASSESSMENT & PLAN  This is a complex 74-year-old gentleman with alcoholic cirrhosis with biweekly paracenteses, alcohol dependence, low protein levels, history of coronary artery disease and mild cardiomyopathy, pulmonary hypertension likely secondary to all of the above, CKD, chronic thrombocytopenia and anemia  Who is in the infusion clinic for volume overload.    Plan resume torsemide at 40 mg a day and spironolactone 50 mg a day.      CXR   IMPRESSION: Small right and trace left pleural effusions have not  appreciably changed. Stable postoperative  changes of CABG. No pulmonary edema. There is atelectasis in the right lower lobe. Lungs are otherwise clear.    BMP: creat. 1.77 bun 25 Mag. 2.1     Intake ~500  Output ~500cc thus far    Iron infusion will be set up this week.                 DIPAK Lugo CNP 3/11/2020 10:56 AM      Orders this Visit:  Orders Placed This Encounter   Procedures     XR Chest 2 Views     Urea nitrogen     Urea nitrogen     Creatinine     Creatinine     Magnesium     Potassium     Potassium     Sodium     Potassium     Magnesium     EKG 12-lead, tracing only     Vital Signs     Pulse oximetry nursing     Weigh patient     Measure urine output     Patient Teaching: Heart Failure     Peripheral IV catheter     Activity: Up ad kalyani     Notify Provider     Notify Provider     May discharge when: other (specify in comments) IF Systolic blood pressure is greater than or equal to 90 mmHg AND patient is ambulatory without symptoms.     Discharge planning     Oxygen: Nasal cannula     Orders Placed This Encounter   Medications     lidocaine 1 % 0.1-1 mL     lidocaine (LMX4) cream     sodium chloride (PF) 0.9% PF flush 3 mL     sodium chloride (PF) 0.9% PF flush 3 mL     potassium chloride ER (KLOR-CON M) CR tablet 20-40 mEq     potassium chloride (KLOR-CON) Packet 20-40 mEq     potassium chloride 10 mEq in 100 mL sterile water intermittent infusion (premix)     potassium chloride 10 mEq in 100 mL intermittent infusion with 10 mg lidocaine     potassium chloride 20 mEq in 50 mL intermittent infusion     magnesium sulfate 4 g in 100 mL sterile water (premade)     chlorothiazide (DIURIL) intermittent infusion 500 mg     furosemide (LASIX) 100 mg in sodium chloride 0.9 % 100 mL infusion     furosemide (LASIX) injection 40 mg     There are no discontinued medications.        CURRENT MEDICATIONS:  Medications Prior to Admission   Medication Sig Dispense Refill Last Dose     aspirin 81 MG EC tablet Take 81 mg by mouth daily   3/10/2020  "at Unknown time     gabapentin (NEURONTIN) 600 MG tablet Take 600 mg by mouth 2 times daily   3/11/2020 at Unknown time     loperamide (IMODIUM) 2 MG capsule Take 4 mg by mouth as needed for diarrhea   3/10/2020 at Unknown time     rivaroxaban ANTICOAGULANT (XARELTO) 15 MG TABS tablet Take 1 tablet (15 mg) by mouth daily (with dinner)   3/10/2020 at Unknown time     spironolactone (ALDACTONE) 50 MG tablet Take 1 tablet (50 mg) by mouth daily 90 tablet 3 3/10/2020 at Unknown time     torsemide (DEMADEX) 20 MG tablet Take 2 tablets (40 mg) by mouth every morning 180 tablet 3 3/10/2020 at Unknown time       ALLERGIES     Allergies   Allergen Reactions     Kdc:Minocycline+Singer Blue Fcf GI Disturbance     11/01/16-PT IS NOT AWARE OF THIS REACTION     Ciprofloxacin Itching     Severe itching \"like ants were crawling\"     Hmg-Coa-R Inhibitors Other (See Comments)     Rhabdomyolysis occurred within a couple days  Rhabdomyolysis       PAST MEDICAL HISTORY:  Past Medical History:   Diagnosis Date     Alcoholism (H)      Amputated left leg (H)      Anemia      Anticardiolipin antibody syndrome (H)      Antiphospholipid antibody syndrome (H)      Antiplatelet or antithrombotic long-term use      Aortic root dilatation (H)      Aortic stenosis      Arthritis      Balance problem      Coronary artery disease     CABG 2007: SVG to LAD, SVG to diagonal, SVG to OM; cardiac cath 5/17/18: thrombectomy for restenosis of stents-sent for urgent CABG, cath 5/14/2007: GRECIA x2 to LAD, Grecia to diagonal     Gastro-oesophageal reflux disease      Heart attack (H) 2007    apparently arrested, cardiac X5     Hiatal hernia      Hypercholesterolemia      Hypertension      Ischemic cardiomyopathy      Left foot drop      Liver disease     alcoholic liver disease, alcoholic cirrohsosis, portal hypertension     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)     ?When diagnosed, patient not aware of this     Moderate mitral regurgitation      " Monoclonal gammopathy      Neuropathy      Noninfectious ileitis     diverticulitis of colon     Nonrheumatic tricuspid valve regurgitation      Paroxysmal atrial fibrillation (H)      PE (pulmonary embolism) 2006    saddle emboli 2006     Prostate cancer (H) 2000    Treated with radiation (seed implants in 2000) -- no problems since     Pulmonary hypertension (H)      Renal disease     kidney stones     Syncope      Thrombocytopenia (H)      Urethral stricture      Venous thrombosis     IVC filter and lysis procedures 2007       PAST SURGICAL HISTORY:  Past Surgical History:   Procedure Laterality Date     AMPUTATE LEG BELOW KNEE Left 04/2014    related to gangrene, started as foot ulcer related to neuropathy     AMPUTATE LEG BELOW KNEE Left 12/4/2017    Procedure: AMPUTATE LEG BELOW KNEE;  Exploration of Left Leg Below Knee Amputation Stump with Ligation of Bleeders.;  Surgeon: Leandro Arreola MD;  Location:  OR     APPENDECTOMY       APPLY WOUND VAC Left 12/6/2017    Procedure: APPLY WOUND VAC;;  Surgeon: Saima Howard MD;  Location:  SD     ARTHROPLASTY HIP Right 11/27/2018    Procedure: RIGHT TOTAL HIP ARTHROPLASTY;  Surgeon: Saima Howard MD;  Location:  OR     ARTHROPLASTY KNEE Right 9/19/2014    Procedure: ARTHROPLASTY KNEE;  Surgeon: Saima Howard MD;  Location:  OR     CARDIAC SURGERY      CABG     CHOLECYSTECTOMY       COLONOSCOPY       COMBINED CYSTOSCOPY, RETROGRADES, EXCHANGE STENT URETER(S) Left 7/24/2018    Procedure: COMBINED CYSTOSCOPY, RETROGRADES, EXCHANGE STENT URETER(S);  CYSTOSCOPY, BILATERAL RETROGRADES, BILATERAL URETERAL STENT EXCHANGE ;  Surgeon: Matt Ceravntes MD;  Location:  OR     CRANIOTOMY Right 4/7/2018    Procedure: CRANIOTOMY;  Right Craniotomy For Subdural Hematoma;  Surgeon: Jono Issa MD;  Location:  OR     CYSTOSCOPY, DILATE URETHRA, COMBINED N/A 10/12/2016    Procedure: COMBINED CYSTOSCOPY, DILATE URETHRA;  Surgeon:  Dimitry Bello MD;  Location:  OR     CYSTOSCOPY, REMOVE STENT(S), COMBINED Right 7/24/2018    Procedure: COMBINED CYSTOSCOPY, REMOVE STENT(S);;  Surgeon: Jose Hunter MD;  Location:  OR     ENDOSCOPIC STRIPPING VEIN(S)       esophagogastroduodenoscopy       ESOPHAGOSCOPY, GASTROSCOPY, DUODENOSCOPY (EGD), COMBINED N/A 3/11/2019    Procedure: COMBINED ESOPHAGOSCOPY, GASTROSCOPY, DUODENOSCOPY (EGD), BIOPSY SINGLE OR MULTIPLE;  Surgeon: Katherin Boyd MD;  Location:  GI     ESOPHAGOSCOPY, GASTROSCOPY, DUODENOSCOPY (EGD), COMBINED N/A 9/27/2019    Procedure: ESOPHAGOGASTRODUODENOSCOPY, WITH FOREIGN BODY REMOVAL;  Surgeon: Raegan Mckeon MD;  Location:  GI     EYE SURGERY      cataracts     GENITOURINARY SURGERY      Prostate Seen Implants     HERNIA REPAIR      Umbilical     INCISION AND DRAINAGE LOWER EXTREMITY, COMBINED Left 12/1/2017    Procedure: COMBINED INCISION AND DRAINAGE LOWER EXTREMITY;  INCISION AND DRAINAGE OF LEFT BELOW KNEE AMPUTATION ABSCESS, WOUND VAC PLACEMENT;  Surgeon: Saima Howard MD;  Location:  OR     IR IVC FILTER PLACEMENT       IRRIGATION AND DEBRIDEMENT LOWER EXTREMITY, COMBINED Left 12/6/2017    Procedure: COMBINED IRRIGATION AND DEBRIDEMENT LOWER EXTREMITY;  IRRIGATION AND DEBRIDEMENT OF LEFT BELOW KNEE AMPUTATION SITE WITH WOUND VAC REPLACEMENT;  Surgeon: Saima Howard MD;  Location:  SD     IRRIGATION AND DEBRIDEMENT LOWER EXTREMITY, COMBINED Left 12/8/2017    Procedure: COMBINED IRRIGATION AND DEBRIDEMENT LOWER EXTREMITY;  IRRIGATION AND DEBRIDEMENT OF LEFT BELOW THE KNEE AMPUTATION AND SHORTENING OF THE TIBIA WITH WOUND CLOSURE;  Surgeon: Saima Howard MD;  Location:  OR     LASER HOLMIUM LITHOTRIPSY URETER(S), INSERT STENT, COMBINED Bilateral 6/28/2018    Procedure: COMBINED CYSTOSCOPY, URETEROSCOPY, LASER HOLMIUM LITHOTRIPSY URETER(S), INSERT STENT;  CYSTOSCOPY, BILATERAL URETEROSCOPY,  HOLMIUM LASER LITHOTRIPSY, BILATERAL STENT  PLACEMENT, STONE BASKETING;  Surgeon: Jose Hunter MD;  Location:  OR     LASER HOLMIUM LITHOTRIPSY URETER(S), INSERT STENT, COMBINED Left 2018    Procedure: COMBINED CYSTOSCOPY, URETEROSCOPY, LASER HOLMIUM LITHOTRIPSY URETER(S), INSERT STENT;  CYSTOSCOPY, LEFT URETEROSCOPY, LEFT LASER HOLMIUM LITHOTRIPSY URETER(S) REMOVAL BILATERAL STENTS;  Surgeon: Jose Hunter MD;  Location:  OR     ORTHOPEDIC SURGERY      (R) knee scope     ORTHOPEDIC SURGERY      total hip      ORTHOPEDIC SURGERY      elbow surgery       FAMILY HISTORY:  Family History   Problem Relation Age of Onset     Lung Cancer Mother      Heart Failure Father          age 87       SOCIAL HISTORY:  Social History     Socioeconomic History     Marital status:      Spouse name: Not on file     Number of children: 2     Years of education: Not on file     Highest education level: Not on file   Occupational History     Occupation: Retired    Social Needs     Financial resource strain: Not on file     Food insecurity     Worry: Not on file     Inability: Not on file     Transportation needs     Medical: Not on file     Non-medical: Not on file   Tobacco Use     Smoking status: Never Smoker     Smokeless tobacco: Never Used   Substance and Sexual Activity     Alcohol use: Yes     Comment: quit 10/2015; now abt 1-3 vodka's per night     Drug use: No     Sexual activity: Not Currently     Partners: Female   Lifestyle     Physical activity     Days per week: Not on file     Minutes per session: Not on file     Stress: Not on file   Relationships     Social connections     Talks on phone: Not on file     Gets together: Not on file     Attends [employer] service: Not on file     Active member of club or organization: Not on file     Attends meetings of clubs or organizations: Not on file     Relationship status: Not on file     Intimate partner violence     Fear of current or ex partner: Not on file     Emotionally abused:  Not on file     Physically abused: Not on file     Forced sexual activity: Not on file   Other Topics Concern     Parent/sibling w/ CABG, MI or angioplasty before 65F 55M? Not Asked   Social History Narrative    , 2 children, retired . He does not have a living will but desires full code.  (last updated 2/28/2019)        Review of Systems:  10 point review of systems negative except as listed in HPI    Recent Lab Results:  LIPID RESULTS:  No results found for: CHOL, HDL, LDL, TRIG, CHOLHDLRATIO    LIVER ENZYME RESULTS:  Lab Results   Component Value Date    AST 27 09/27/2019    ALT 10 09/27/2019       CBC RESULTS:  Lab Results   Component Value Date    WBC 4.1 03/09/2020    RBC 3.53 (L) 03/09/2020    HGB 7.7 (L) 03/09/2020    HCT 26.6 (L) 03/09/2020    MCV 75 (L) 03/09/2020    MCH 21.8 (L) 03/09/2020    MCHC 28.9 (L) 03/09/2020    RDW 16.9 (H) 03/09/2020     03/09/2020       BMP RESULTS:  Lab Results   Component Value Date     (L) 03/09/2020    POTASSIUM 3.7 03/09/2020    CHLORIDE 98 03/09/2020    CO2 26 03/09/2020    ANIONGAP 13.7 03/09/2020     (H) 03/09/2020    BUN 28 03/09/2020    CR 1.80 (H) 03/09/2020    GFRESTIMATED 37 (L) 03/09/2020    GFRESTBLACK 45 (L) 03/09/2020    BENNIE 9.3 03/09/2020        A1C RESULTS:  Lab Results   Component Value Date    A1C 4.6 05/18/2018       INR RESULTS:  Lab Results   Component Value Date    INR 2.34 (H) 02/10/2020    INR 2.05 (H) 12/30/2019         CC  No referring provider defined for this encounter.

## 2020-03-12 NOTE — PROGRESS NOTES
"[facility]  Post Diuretic Infusion - Follow-up Phone Assessment      Diuretic received during infusion    Recent weights:   3/12 229 (Care Suites)   3/9 239 (Home Wt)  Current goal wt: 200-210 per Juli Smith PA-C  Current daily diuretic:    Torsemide 40 mg daily   Spironolactone 50 mg daily  HF symptoms:   SOB   BLE swelling    Future Appointments   Date Time Provider Department Center   3/18/2020  3:00 PM [facility]IN   3/18/2020  3:50 PM Lacy Pavon APRN CNP [facility] PSA CLIN   3/23/2020  2:00 PM [facility]   4/6/2020  2:00 PM [facility]   4/20/2020  2:00 PM [facility]   5/4/2020  2:00 PM [facility]   5/18/2020  2:00 PM [facility]   6/1/2020  2:00 PM [facility]   6/15/2020  2:00 PM [facility]   6/29/2020  2:00 PM [facility]   7/13/2020  2:00 PM [facility]   7/27/2020  2:00 PM [facility]   8/10/2020  2:00 PM [facility]   8/24/2020  2:00 PM [facility]   9/8/2020  2:00 PM [facility]     Called pt to assess wts and sx, and to set up iron infusion. States he's feeling \"shaky\" and \"tippy\" today. Denies dizziness, just states he feels less stable on his feet. He filled up 2 urinals last evening/overnight. Wt 212 this AM on home scale. States he does not believe the wt is accurate. Did not weigh at home yesterday. Took both torsemide and spironolactone this AM. Does not take BP at home. Advised to check wt right away tomorrow and call CORE RN if lower than 212. Also advised to be slow with position changes today. Verbalized understanding. Denies SOB or peripheral swelling today.     Also provided instructions to schedule IV iron infusion. He wrote down phone number. Also confirmed with scheduling at [facility] that they can see the order.     Will also route to Storm Pavon NP, " who pt saw in infusion clinic yesterday and will see pt for follow-up on 3/18/20.     Mesha Graham, BSN, RN, PHN, HNB-BC   3/12/2020 at 10:51 AM    Update: Pt left  indicating that he scheduled IV iron appointments on 3/18 and 3/25.      3/12/2020 at 12:15 PM

## 2020-03-17 ENCOUNTER — CARE COORDINATION (OUTPATIENT)
Dept: CARDIOLOGY | Facility: CLINIC | Age: 75
End: 2020-03-17

## 2020-03-17 NOTE — PROGRESS NOTES
[facility]        Discussed converting appointment with Storm Pavon CNP on 3/18/20 to a phone appointment. Patient declined and wants to cancel his appointment.     Juli Gaston RN    [facility] [place]  03/17/20 12:13 PM

## 2020-03-17 NOTE — PROGRESS NOTES
Wellness Screening Tool    Symptom Screening:    Called and spoke with *** to review the following questions over the phone prior to *** upcoming OV in the [facility]    Do you have a:      Fever?  ***    Cough?  ***    Shortness of breath?  ***     Skin rash?  ***     Within the past 14 days, have you been in contact with someone who:      Is currently being ruled out for COVID-19 (novel coronavirus)?  ***    Has tested positive for COVID-19?  ***     Has symptoms of a respiratory illness (fever, cough, shortness of breath)?  ***    Have you or someone you have been in contact with traveled to an area with COVID-19:      Refer to the CDC Coronavirus webpage for COVID-19 areas:  *** {if yes, what country?}    Within the past 3 weeks, have you been exposed to the following:      Pertussis?  ***    Chicken pox?  ***    Measles? ***     Patient's appointment status:      {Patient will be seen in clinic as scheduled}  {Appointment converted to phone visit}  {Appointment rescheduled to a later date}    Was the patient instructed to call [phone] or log onto [email or web address]? ***      Future Appointments   Date Time Provider Department Center   3/18/2020  9:00 AM [facility] INFUSION CHAIR 14 [facility]   3/18/2020  3:00 PM [facility] CLIN   3/18/2020  3:50 PM Lacy Pavon APRN CNP [facility] PSA CLIN   3/23/2020  2:00 PM [facility]   3/25/2020  9:30 AM SH INFUSION CHAIR 14 [facility]   4/6/2020  2:00 PM [facility]   4/20/2020  2:00 PM [facility]   5/4/2020  2:00 PM [facility]   5/18/2020  2:00 PM [facility]   6/1/2020  2:00 PM [facility]   6/15/2020  2:00 PM [facility]   6/29/2020  2:00 PM [facility]   7/13/2020  2:00 PM [facility]   7/27/2020  2:00 PM [facility]   8/10/2020  2:00 PM [facility]   8/24/2020  2:00 PM [facility]    9/8/2020  2:00 PM [facility]

## 2020-03-18 RX ORDER — NICOTINE POLACRILEX 4 MG
15-30 LOZENGE BUCCAL
Status: CANCELLED | OUTPATIENT
Start: 2020-03-18

## 2020-03-18 RX ORDER — DEXTROSE MONOHYDRATE 25 G/50ML
25-50 INJECTION, SOLUTION INTRAVENOUS
Status: CANCELLED | OUTPATIENT
Start: 2020-03-18

## 2020-03-18 RX ORDER — ALBUMIN (HUMAN) 12.5 G/50ML
12.5 SOLUTION INTRAVENOUS ONCE
Status: CANCELLED | OUTPATIENT
Start: 2020-03-18 | End: 2020-03-18

## 2020-03-20 ENCOUNTER — TELEPHONE (OUTPATIENT)
Dept: MEDSURG UNIT | Facility: CLINIC | Age: 75
End: 2020-03-20

## 2020-03-23 ENCOUNTER — HOSPITAL ENCOUNTER (OUTPATIENT)
Dept: ULTRASOUND IMAGING | Facility: CLINIC | Age: 75
End: 2020-03-23
Attending: INTERNAL MEDICINE
Payer: MEDICARE

## 2020-03-23 ENCOUNTER — HOSPITAL ENCOUNTER (OUTPATIENT)
Facility: CLINIC | Age: 75
Discharge: HOME | End: 2020-03-23
Admitting: INTERNAL MEDICINE
Payer: MEDICARE

## 2020-03-23 VITALS
TEMPERATURE: 96.3 F | OXYGEN SATURATION: 96 % | RESPIRATION RATE: 16 BRPM | DIASTOLIC BLOOD PRESSURE: 63 MMHG | SYSTOLIC BLOOD PRESSURE: 112 MMHG | HEART RATE: 67 BPM

## 2020-03-23 DIAGNOSIS — K70.31 ALCOHOLIC CIRRHOSIS OF LIVER WITH ASCITES (H): ICD-10-CM

## 2020-03-23 LAB — INR PPP: 1.42 (ref 0.86–1.14)

## 2020-03-23 PROCEDURE — 40000863 ZZH STATISTIC RADIOLOGY XRAY, US, CT, MAR, NM

## 2020-03-23 PROCEDURE — 25000125 ZZHC RX 250: Performed by: RADIOLOGY

## 2020-03-23 PROCEDURE — 36415 COLL VENOUS BLD VENIPUNCTURE: CPT | Performed by: PHYSICIAN ASSISTANT

## 2020-03-23 PROCEDURE — 49083 ABD PARACENTESIS W/IMAGING: CPT

## 2020-03-23 PROCEDURE — 85610 PROTHROMBIN TIME: CPT | Performed by: PHYSICIAN ASSISTANT

## 2020-03-23 RX ORDER — NICOTINE POLACRILEX 4 MG
15-30 LOZENGE BUCCAL
Status: CANCELLED | OUTPATIENT
Start: 2020-03-23

## 2020-03-23 RX ORDER — LIDOCAINE HYDROCHLORIDE 10 MG/ML
10 INJECTION, SOLUTION EPIDURAL; INFILTRATION; INTRACAUDAL; PERINEURAL ONCE
Status: COMPLETED | OUTPATIENT
Start: 2020-03-23 | End: 2020-03-23

## 2020-03-23 RX ORDER — DEXTROSE MONOHYDRATE 25 G/50ML
25-50 INJECTION, SOLUTION INTRAVENOUS
Status: CANCELLED | OUTPATIENT
Start: 2020-03-23

## 2020-03-23 RX ADMIN — LIDOCAINE HYDROCHLORIDE 10 ML: 10 INJECTION, SOLUTION EPIDURAL; INFILTRATION; INTRACAUDAL; PERINEURAL at 14:34

## 2020-03-23 NOTE — PROCEDURES
[facility]    Procedure: Paracentesis    Date/Time: 3/23/2020 2:55 PM  Performed by: Patricio Dye MD  Authorized by: Patricio Dye MD     UNIVERSAL PROTOCOL   Site Marked: NA  Prior Images Obtained and Reviewed:  Yes  Required items: Required blood products, implants, devices and special equipment available    Patient identity confirmed:  Verbally with patient, arm band, provided demographic data and hospital-assigned identification number  Patient was reevaluated immediately before administering moderate or deep sedation or anesthesia  Confirmation Checklist:  Patient's identity using two indicators, relevant allergies, procedure was appropriate and matched the consent or emergent situation and correct equipment/implants were available  Time out: Immediately prior to the procedure a time out was called    Universal Protocol: the Joint Commission Universal Protocol was followed    Preparation: Patient was prepped and draped in usual sterile fashion           ANESTHESIA    Anesthesia: Local infiltration  Local Anesthetic:  Lidocaine 1% without epinephrine      SEDATION    Patient Sedated: No    See dictated procedure note for full details.  Findings: US guided paracentesis with removal of ascites.    Specimens: none    Complications: None    Condition: Stable    PROCEDURE   Patient Tolerance:  Patient tolerated the procedure well with no immediate complications    Length of time physician/provider present for 1:1 monitoring during sedation: 0

## 2020-03-23 NOTE — DISCHARGE INSTRUCTIONS

## 2020-03-30 ENCOUNTER — NURSE TRIAGE (OUTPATIENT)
Dept: FAMILY MEDICINE | Facility: CLINIC | Age: 75
End: 2020-03-30

## 2020-03-30 NOTE — TELEPHONE ENCOUNTER
"Patient called clinic today.   Was scheduled for Telephone Visit with Dr Hardy today at 2:30 pm----for \"blood in stool, numbness in hands\".      Call was not sent to triage.   MA asked that triage call patient regarding symptoms to determine if Telephone Visit OK---or other advise.     Attempted to call patient.   Left message on answering machine for patient to call back.    Isabella PEREZ RN,BSN      "

## 2020-03-30 NOTE — TELEPHONE ENCOUNTER
"Routing to Dr Hardy as TRIP.   Advised ED now.   Patient hesitantly agreed and will go to ED.    Patient had originally called clinic to schedule Telephone Visit with PCP today (cancelled).    In addition to rectal bleeding and vomiting (denies signs of blood in emesis)---patient complaining of \"worsening\" numbness/tingling in both hands and fingers. Reports gradual dexterity problems over the past few years. Symptoms seem worse to patient.    Advised patient to include this symptom at ED and can be followed up with Dr Hardy at future appointment.          Reason for Disposition    Bloody, black, or tarry bowel movements    MODERATE rectal bleeding (small blood clots, passing blood without stool, or toilet water turns red) more than once a day    Answer Assessment - Initial Assessment Questions  1. APPEARANCE of BLOOD: \"What color is it?\" \"Is it passed separately, on the surface of the stool, or mixed in with the stool?\"     Black tarry stools with  Bright red blood in stool and in toilet water. Passed what seems like a large blood clot last night.   2. AMOUNT: \"How much blood was passed?\"       With every BM---2-3 times/day-----x 10 days  3. FREQUENCY: \"How many times has blood been passed with the stools?\"       With every BM.    4. ONSET: \"When was the blood first seen in the stools?\" (Days or weeks)       10 days  5. DIARRHEA: \"Is there also some diarrhea?\" If so, ask: \"How many diarrhea stools were passed in past 24 hours?\"       Slimy stools  6. CONSTIPATION: \"Do you have constipation?\" If so, \"How bad is it?\"      no  7. RECURRENT SYMPTOMS: \"Have you had blood in your stools before?\" If so, ask: \"When was the last time?\" and \"What happened that time?\"       no  8. BLOOD THINNERS: \"Do you take any blood thinners?\" (e.g., Coumadin/warfarin, Pradaxa/dabigatran, aspirin)      Xarelto  9. OTHER SYMPTOMS: \"Do you have any other symptoms?\"  (e.g., abdominal pain, vomiting, dizziness, fever)      Vomited yesterday " and today after having BM.  Denies dizziness, fever, pain.    Protocols used: RECTAL BLEEDING-A-[place]    Isabella PEREZ RN,BSN

## 2020-03-31 ENCOUNTER — HOSPITAL ENCOUNTER (INPATIENT)
Facility: CLINIC | Age: 75
LOS: 26 days | Discharge: HOME HEALTH | DRG: 299 | End: 2020-04-26
Attending: EMERGENCY MEDICINE | Admitting: INTERNAL MEDICINE
Payer: MEDICARE

## 2020-03-31 DIAGNOSIS — K70.9 ALCOHOLIC LIVER DISEASE (H): ICD-10-CM

## 2020-03-31 DIAGNOSIS — K92.1 GASTROINTESTINAL HEMORRHAGE WITH MELENA: ICD-10-CM

## 2020-03-31 DIAGNOSIS — K70.31 ALCOHOLIC CIRRHOSIS OF LIVER WITH ASCITES (H): Primary | ICD-10-CM

## 2020-03-31 DIAGNOSIS — D62 ANEMIA DUE TO BLOOD LOSS, ACUTE: ICD-10-CM

## 2020-03-31 DIAGNOSIS — R79.1 ELEVATED INR: ICD-10-CM

## 2020-03-31 PROBLEM — K62.5 RECTAL BLEEDING: Status: ACTIVE | Noted: 2020-03-31

## 2020-03-31 LAB
ALBUMIN SERPL-MCNC: 2.6 G/DL (ref 3.4–5)
ALP SERPL-CCNC: 76 U/L (ref 40–150)
ALT SERPL W P-5'-P-CCNC: 13 U/L (ref 0–70)
ANION GAP SERPL CALCULATED.3IONS-SCNC: 8 MMOL/L (ref 3–14)
ANISOCYTOSIS BLD QL SMEAR: SLIGHT
AST SERPL W P-5'-P-CCNC: 23 U/L (ref 0–45)
BASOPHILS # BLD AUTO: 0.1 10E9/L (ref 0–0.2)
BASOPHILS NFR BLD AUTO: 2 %
BILIRUB SERPL-MCNC: 0.7 MG/DL (ref 0.2–1.3)
BLD PROD TYP BPU: NORMAL
BLD PROD TYP BPU: NORMAL
BLD UNIT ID BPU: 0
BLD UNIT ID BPU: 0
BLOOD PRODUCT CODE: NORMAL
BLOOD PRODUCT CODE: NORMAL
BPU ID: NORMAL
BPU ID: NORMAL
BUN SERPL-MCNC: 35 MG/DL (ref 7–30)
CALCIUM SERPL-MCNC: 8.7 MG/DL (ref 8.5–10.1)
CHLORIDE SERPL-SCNC: 106 MMOL/L (ref 94–109)
CO2 SERPL-SCNC: 25 MMOL/L (ref 20–32)
CREAT SERPL-MCNC: 1.59 MG/DL (ref 0.66–1.25)
DIFFERENTIAL METHOD BLD: ABNORMAL
EOSINOPHIL # BLD AUTO: 0.2 10E9/L (ref 0–0.7)
EOSINOPHIL NFR BLD AUTO: 4 %
ERYTHROCYTE [DISTWIDTH] IN BLOOD BY AUTOMATED COUNT: 18.2 % (ref 10–15)
GFR SERPL CREATININE-BSD FRML MDRD: 41 ML/MIN/{1.73_M2}
GLUCOSE SERPL-MCNC: 103 MG/DL (ref 70–99)
HCT VFR BLD AUTO: 18.5 % (ref 40–53)
HGB BLD-MCNC: 5.4 G/DL (ref 13.3–17.7)
HGB BLD-MCNC: 7.1 G/DL (ref 13.3–17.7)
HYPOCHROMIA BLD QL: PRESENT
INR PPP: 3.92 (ref 0.86–1.14)
INTERPRETATION ECG - MUSE: NORMAL
LYMPHOCYTES # BLD AUTO: 0.5 10E9/L (ref 0.8–5.3)
LYMPHOCYTES NFR BLD AUTO: 12 %
MCH RBC QN AUTO: 21.7 PG (ref 26.5–33)
MCHC RBC AUTO-ENTMCNC: 29.2 G/DL (ref 31.5–36.5)
MCV RBC AUTO: 74 FL (ref 78–100)
MICROCYTES BLD QL SMEAR: PRESENT
MONOCYTES # BLD AUTO: 0.3 10E9/L (ref 0–1.3)
MONOCYTES NFR BLD AUTO: 7 %
NEUTROPHILS # BLD AUTO: 3.2 10E9/L (ref 1.6–8.3)
NEUTROPHILS NFR BLD AUTO: 75 %
PLATELET # BLD AUTO: 175 10E9/L (ref 150–450)
PLATELET # BLD EST: ABNORMAL 10*3/UL
POTASSIUM SERPL-SCNC: 3.8 MMOL/L (ref 3.4–5.3)
PROT SERPL-MCNC: 6.6 G/DL (ref 6.8–8.8)
RBC # BLD AUTO: 2.49 10E12/L (ref 4.4–5.9)
SODIUM SERPL-SCNC: 139 MMOL/L (ref 133–144)
TRANSFUSION STATUS PATIENT QL: NORMAL
WBC # BLD AUTO: 4.3 10E9/L (ref 4–11)

## 2020-03-31 PROCEDURE — C9113 INJ PANTOPRAZOLE SODIUM, VIA: HCPCS | Performed by: PHYSICIAN ASSISTANT

## 2020-03-31 PROCEDURE — P9016 RBC LEUKOCYTES REDUCED: HCPCS | Performed by: EMERGENCY MEDICINE

## 2020-03-31 PROCEDURE — 36430 TRANSFUSION BLD/BLD COMPNT: CPT

## 2020-03-31 PROCEDURE — 25800030 ZZH RX IP 258 OP 636: Performed by: PHYSICIAN ASSISTANT

## 2020-03-31 PROCEDURE — 25000132 ZZH RX MED GY IP 250 OP 250 PS 637: Mod: GY | Performed by: INTERNAL MEDICINE

## 2020-03-31 PROCEDURE — 86850 RBC ANTIBODY SCREEN: CPT | Performed by: EMERGENCY MEDICINE

## 2020-03-31 PROCEDURE — 99285 EMERGENCY DEPT VISIT HI MDM: CPT | Mod: 25

## 2020-03-31 PROCEDURE — 96365 THER/PROPH/DIAG IV INF INIT: CPT

## 2020-03-31 PROCEDURE — 96375 TX/PRO/DX INJ NEW DRUG ADDON: CPT

## 2020-03-31 PROCEDURE — 25000128 H RX IP 250 OP 636: Performed by: PHYSICIAN ASSISTANT

## 2020-03-31 PROCEDURE — 25000128 H RX IP 250 OP 636: Performed by: EMERGENCY MEDICINE

## 2020-03-31 PROCEDURE — 85610 PROTHROMBIN TIME: CPT | Performed by: EMERGENCY MEDICINE

## 2020-03-31 PROCEDURE — 99223 1ST HOSP IP/OBS HIGH 75: CPT | Mod: AI | Performed by: PHYSICIAN ASSISTANT

## 2020-03-31 PROCEDURE — C9113 INJ PANTOPRAZOLE SODIUM, VIA: HCPCS | Performed by: EMERGENCY MEDICINE

## 2020-03-31 PROCEDURE — 80053 COMPREHEN METABOLIC PANEL: CPT | Performed by: EMERGENCY MEDICINE

## 2020-03-31 PROCEDURE — 25000132 ZZH RX MED GY IP 250 OP 250 PS 637: Mod: GY | Performed by: PHYSICIAN ASSISTANT

## 2020-03-31 PROCEDURE — 86901 BLOOD TYPING SEROLOGIC RH(D): CPT | Performed by: EMERGENCY MEDICINE

## 2020-03-31 PROCEDURE — 93005 ELECTROCARDIOGRAM TRACING: CPT

## 2020-03-31 PROCEDURE — 85018 HEMOGLOBIN: CPT | Performed by: PHYSICIAN ASSISTANT

## 2020-03-31 PROCEDURE — 36415 COLL VENOUS BLD VENIPUNCTURE: CPT | Performed by: PHYSICIAN ASSISTANT

## 2020-03-31 PROCEDURE — 86923 COMPATIBILITY TEST ELECTRIC: CPT | Performed by: EMERGENCY MEDICINE

## 2020-03-31 PROCEDURE — 12000000 ZZH R&B MED SURG/OB

## 2020-03-31 PROCEDURE — 86900 BLOOD TYPING SEROLOGIC ABO: CPT | Performed by: EMERGENCY MEDICINE

## 2020-03-31 PROCEDURE — 25000128 H RX IP 250 OP 636: Performed by: INTERNAL MEDICINE

## 2020-03-31 PROCEDURE — 85025 COMPLETE CBC W/AUTO DIFF WBC: CPT | Performed by: EMERGENCY MEDICINE

## 2020-03-31 PROCEDURE — 25800030 ZZH RX IP 258 OP 636: Performed by: EMERGENCY MEDICINE

## 2020-03-31 RX ORDER — MULTIPLE VITAMINS W/ MINERALS TAB 9MG-400MCG
1 TAB ORAL DAILY
Status: DISCONTINUED | OUTPATIENT
Start: 2020-04-01 | End: 2020-01-01

## 2020-03-31 RX ORDER — NITROGLYCERIN 0.4 MG/1
0.4 TABLET SUBLINGUAL EVERY 5 MIN PRN
Status: DISCONTINUED | OUTPATIENT
Start: 2020-03-31 | End: 2020-01-01

## 2020-03-31 RX ORDER — PROCHLORPERAZINE MALEATE 5 MG
5 TABLET ORAL EVERY 6 HOURS PRN
Status: DISCONTINUED | OUTPATIENT
Start: 2020-03-31 | End: 2020-01-01 | Stop reason: HOSPADM

## 2020-03-31 RX ORDER — GABAPENTIN 600 MG/1
600 TABLET ORAL 2 TIMES DAILY
Status: DISCONTINUED | OUTPATIENT
Start: 2020-03-31 | End: 2020-01-01 | Stop reason: HOSPADM

## 2020-03-31 RX ORDER — CEFTRIAXONE 2 G/1
2 INJECTION, POWDER, FOR SOLUTION INTRAMUSCULAR; INTRAVENOUS EVERY 24 HOURS
Status: DISCONTINUED | OUTPATIENT
Start: 2020-03-31 | End: 2020-01-01

## 2020-03-31 RX ORDER — ONDANSETRON 2 MG/ML
4 INJECTION INTRAMUSCULAR; INTRAVENOUS EVERY 6 HOURS PRN
Status: DISCONTINUED | OUTPATIENT
Start: 2020-03-31 | End: 2020-01-01 | Stop reason: HOSPADM

## 2020-03-31 RX ORDER — FOLIC ACID 1 MG/1
1 TABLET ORAL DAILY
Status: DISCONTINUED | OUTPATIENT
Start: 2020-04-01 | End: 2020-01-01

## 2020-03-31 RX ORDER — SODIUM CHLORIDE 9 MG/ML
INJECTION, SOLUTION INTRAVENOUS CONTINUOUS
Status: DISCONTINUED | OUTPATIENT
Start: 2020-03-31 | End: 2020-01-01

## 2020-03-31 RX ORDER — LANOLIN ALCOHOL/MO/W.PET/CERES
100 CREAM (GRAM) TOPICAL DAILY
Status: DISPENSED | OUTPATIENT
Start: 2020-04-01 | End: 2020-01-01

## 2020-03-31 RX ORDER — BISACODYL 10 MG
10 SUPPOSITORY, RECTAL RECTAL DAILY PRN
Status: DISCONTINUED | OUTPATIENT
Start: 2020-03-31 | End: 2020-01-01 | Stop reason: HOSPADM

## 2020-03-31 RX ORDER — ALBUTEROL SULFATE 90 UG/1
2 AEROSOL, METERED RESPIRATORY (INHALATION) EVERY 6 HOURS PRN
Status: ON HOLD | COMMUNITY
End: 2020-01-01

## 2020-03-31 RX ORDER — ACETAMINOPHEN 325 MG/1
650 TABLET ORAL EVERY 4 HOURS PRN
Status: DISCONTINUED | OUTPATIENT
Start: 2020-03-31 | End: 2020-01-01 | Stop reason: HOSPADM

## 2020-03-31 RX ORDER — POLYETHYLENE GLYCOL 3350 17 G/17G
17 POWDER, FOR SOLUTION ORAL DAILY PRN
Status: DISCONTINUED | OUTPATIENT
Start: 2020-03-31 | End: 2020-01-01

## 2020-03-31 RX ORDER — LIDOCAINE 40 MG/G
CREAM TOPICAL
Status: DISCONTINUED | OUTPATIENT
Start: 2020-03-31 | End: 2020-03-31

## 2020-03-31 RX ORDER — PROCHLORPERAZINE 25 MG
12.5 SUPPOSITORY, RECTAL RECTAL EVERY 12 HOURS PRN
Status: DISCONTINUED | OUTPATIENT
Start: 2020-03-31 | End: 2020-01-01 | Stop reason: HOSPADM

## 2020-03-31 RX ORDER — NALOXONE HYDROCHLORIDE 0.4 MG/ML
.1-.4 INJECTION, SOLUTION INTRAMUSCULAR; INTRAVENOUS; SUBCUTANEOUS
Status: DISCONTINUED | OUTPATIENT
Start: 2020-03-31 | End: 2020-01-01 | Stop reason: HOSPADM

## 2020-03-31 RX ORDER — ONDANSETRON 4 MG/1
4 TABLET, ORALLY DISINTEGRATING ORAL EVERY 6 HOURS PRN
Status: DISCONTINUED | OUTPATIENT
Start: 2020-03-31 | End: 2020-01-01 | Stop reason: HOSPADM

## 2020-03-31 RX ORDER — LIDOCAINE 40 MG/G
CREAM TOPICAL
Status: DISCONTINUED | OUTPATIENT
Start: 2020-03-31 | End: 2020-01-01

## 2020-03-31 RX ADMIN — SODIUM CHLORIDE 8 MG/HR: 9 INJECTION, SOLUTION INTRAVENOUS at 22:35

## 2020-03-31 RX ADMIN — OCTREOTIDE ACETATE 50 MCG/HR: 200 INJECTION, SOLUTION INTRAVENOUS; SUBCUTANEOUS at 12:30

## 2020-03-31 RX ADMIN — PHYTONADIONE 10 MG: 10 INJECTION, EMULSION INTRAMUSCULAR; INTRAVENOUS; SUBCUTANEOUS at 11:39

## 2020-03-31 RX ADMIN — GABAPENTIN 600 MG: 600 TABLET, FILM COATED ORAL at 20:37

## 2020-03-31 RX ADMIN — SODIUM CHLORIDE 80 MG: 9 INJECTION, SOLUTION INTRAVENOUS at 10:47

## 2020-03-31 RX ADMIN — SODIUM CHLORIDE 8 MG/HR: 9 INJECTION, SOLUTION INTRAVENOUS at 13:56

## 2020-03-31 RX ADMIN — CEFTRIAXONE 2 G: 2 INJECTION, POWDER, FOR SOLUTION INTRAMUSCULAR; INTRAVENOUS at 17:19

## 2020-03-31 RX ADMIN — SODIUM CHLORIDE: 9 INJECTION, SOLUTION INTRAVENOUS at 19:24

## 2020-03-31 RX ADMIN — POLYETHYLENE GLYCOL 3350, SODIUM SULFATE ANHYDROUS, SODIUM BICARBONATE, SODIUM CHLORIDE, POTASSIUM CHLORIDE 4000 ML: 236; 22.74; 6.74; 5.86; 2.97 POWDER, FOR SOLUTION ORAL at 17:19

## 2020-03-31 ASSESSMENT — ACTIVITIES OF DAILY LIVING (ADL)
ADLS_ACUITY_SCORE: 19
COGNITION: 0 - NO COGNITION ISSUES REPORTED
AMBULATION: 0-->INDEPENDENT
SWALLOWING: 0-->SWALLOWS FOODS/LIQUIDS WITHOUT DIFFICULTY
ADLS_ACUITY_SCORE: 19
TOILETING: 1-->ASSISTIVE EQUIPMENT
RETIRED_EATING: 0-->INDEPENDENT
NUMBER_OF_TIMES_PATIENT_HAS_FALLEN_WITHIN_LAST_SIX_MONTHS: 4
BATHING: 1-->ASSISTIVE EQUIPMENT
TRANSFERRING: 0-->INDEPENDENT
RETIRED_COMMUNICATION: 0-->UNDERSTANDS/COMMUNICATES WITHOUT DIFFICULTY
DRESS: 0-->INDEPENDENT
FALL_HISTORY_WITHIN_LAST_SIX_MONTHS: YES

## 2020-03-31 ASSESSMENT — ENCOUNTER SYMPTOMS
VOMITING: 1
NAUSEA: 1
SHORTNESS OF BREATH: 0
COUGH: 0
ABDOMINAL PAIN: 0
ANAL BLEEDING: 1
CHEST TIGHTNESS: 0
BLOOD IN STOOL: 1
DIZZINESS: 0
FEVER: 0

## 2020-03-31 NOTE — ED PROVIDER NOTES
"History     Chief Complaint:  Rectal Bleeding    The history is provided by the patient.     Antonio Ramirez is a 75 year old male with a history of alcoholic liver disease who continues to drink alcohol (\"2-3 stiff Vodka drinks daily lately\") who is anticoagulated on Xarelto (last Xarelto dose was last night) for antiphospholipid antibody syndrome and has a history of chronic anemia treated with Iron Infusions (he missed his last iron infusion appointment recently) who presents today with rectal bleeding. The patient reports rectal bleeding with red and black stools for the last 7-10 days. He reports some nausea and vomiting, which appears \"dark like pepsi\", but notes he does drink pepsi frequently. He called his PCP nurse yesterday and was told to present to the ED. He states he has a history of chronic anemia is with a baseline value from 7's-9's, he has had previous iron infusion, last 1 year ago. He denies a past history of GI bleeds, stomach ulcer, current abdominal pain, chest pain, shortness of breath, dizziness, hematemesis, coughing, fever, cold symptoms.     Allergies:  Ciprofloxacin  Hmg-Coa-R inhibitors       Medications:    aspirin 81 MG tablet  gabapentin  600 MG tablet  loperamide 2 MG capsule  multivitamin  Xarelto 15 MG TABS tablet  torsemide 20 MG tablet  vitamin B1 100 MG tablet     Past Medical History:    Alcoholism  Amputated left leg  Anemia  Anticardiolipin antibody syndrome  Antiphospholipid antibody syndrome  Aortic root dilation  Aortic stenosis   Arthritis  Balance problem  Calculi of ureter   Coronary artery disease    Chronic kidney disease, stage 3  Chronic congestive heart failure   Gastro-oesophagea reflux disease  Heart attack  Hiatal hernia  Hypercholesteremia  Hypertension  Ischemic cardiomyopathy  Left foot drop  Liver disease  Low grade B cell lymphoproliferative disorder   Moderate mitral regurgitation  Monoclonal gammopathy  MSSA bacteremia   Neuropathy  Noninfectious " ileitis  Nonrhematic tricuspid valve regurgitation  Paroxysmal atrial fibrillation  Pulmonary embolism  Prostate cancer    Pulmonary hypertension  Renal disease   Syncope  Thrombocytopenia  Urethral stricture  Venous thrombosis      Past Surgical History:    Ambulate leg below knee x2 (2014 and 2017)   Appendectomy   Apply wound vac  Arthroplasty hip  Arthroplasty knee  CABG  Cholecystectomy  Craniotomy   Endoscopic stripping veins   Eye surgery   Hernia repair umbilical  Incision and drainage lower extremity combined   Irrigation and debridement lower extremity x2  Prostate seen implants   Holmium lithotripsy ureters x2  Elbow surgery   Total hip   right knee scope      Family History:    Mother: lung cancer  Father: heart failure      Social History:  The patient was accompanied to the ED alone.  Smoking Status: Never Smoker  Smokeless Tobacco: Never Used  Alcohol Use: Yes              1 to 2 vodkas a night   Drug Use: No  PCP: [facility]  Marital Status:       Review of Systems   Constitutional: Negative for fever.   Respiratory: Negative for cough, chest tightness and shortness of breath.    Cardiovascular: Negative for chest pain.   Gastrointestinal: Positive for anal bleeding, blood in stool, nausea and vomiting. Negative for abdominal pain.   Neurological: Negative for dizziness.   All other systems reviewed and are negative.     Physical Exam     Patient Vitals for the past 24 hrs:   BP Temp Temp src Pulse Resp SpO2   03/31/20 0939 100/62 97.9  F (36.6  C) Oral 95 18 97 %       Physical Exam   Nursing note and vitals reviewed.  Constitutional:  Appears well-developed and well-nourished.   HENT:   Head:    Atraumatic.   Mouth/Throat:   Oropharynx is clear and moist. No oropharyngeal exudate.   Eyes:    Pupils are equal, round, and reactive to light.   Neck:    Normal range of motion. Neck supple.      No tracheal deviation present. No thyromegaly present.   Cardiovascular:  Normal rate, regular  rhythm, no murmur   Pulmonary/Chest: Breath sounds are clear and equal without wheezes or crackles.  Abdominal:   Soft. Bowel sounds are normal. Exhibits no distension and      no mass. There is no tenderness.      There is no rebound and no guarding.   :                            Small amount of maroon colored stool on his underwear.  Small non-thrombosed external hemorrhoid.  No active bleeding.  Musculoskeletal:  Exhibits no edema. Left BKA.  Lymphadenopathy:  No cervical adenopathy.   Neurological:   Alert and oriented to person, place, and time. GCS 15  Skin:    Skin is PALE No rash noted. Multiple old appearing purple bruises to his arms and one to his low back.      Emergency Department Course     ECG:  Indication: rectal bleeding  Time: 1019  Vent. Rate 85 bpm. OR interval *. QRS duration 86. QT/QTc 434/516. P-R-T axis * 93 -28.  Atrial fibrillation. Lateral infarct, age undetermined. ST & T wave abnormality, consider inferior ischemia. Prolonged QT. Abnormal ECG.  No significant change compared to EKG dated 6/11/2019.   Read time: 1028    Laboratory:  Laboratory results were communicated with the patient who voiced understanding of the findings.    ABO/Rh: O Positive, negative antibody screen    CBC: WBC: 4.3, HGB: 5.4 (L), PLT: 175  CMP: Glucose: 103 (H), BUN: 35 (H), Creatinine: 1.59 (H), GFR: 41 (L), Albumin: 2.6, Protein: 6.6 (L), o/w WNL    INR: 3.92 (H)    Interventions:  Transfusion 1 Unit Packed RBC's IV influsion    Medications   phytonadione (AQUA-MEPHYTON) 10 mg in sodium chloride 0.9 % 50 mL intermittent infusion (has no administration in time range)   pantoprazole (PROTONIX) 80 mg in sodium chloride 0.9 % 100 mL infusion (has no administration in time range)   octreotide (sandoSTATIN) 1,250 mcg in sodium chloride 0.9 % 250 mL (has no administration in time range)   pantoprazole (PROTONIX) 80 mg in sodium chloride 0.9 % 100 mL intermittent infusion (80 mg Intravenous New Bag 3/31/20 1047)        Emergency Department Course:  Nursing notes and vitals reviewed. (9:41 AM) I performed an exam of the patient as documented above.     IV inserted, blood sent to the lab for further testing, results above.     Medicine administered as documented above.    The patient agreed to a blood transfusion. Side effects were reviewed and written consent was signed.    10:40:  I rechecked the patient and discussed the results of their workup thus far.     I consulted with  of the hospitalist services. They are in agreement to accept the patient for admission.    Findings and plan explained to the Patient who consents to admission. Discussed the patient with  who will admit the patient to [facility] for further monitoring, evaluation, and treatment.    Impression & Plan       Medical Decision Making:  Antonio Ramirez is a 75-year-old male who presents with GI bleeding with melanotic stools who I found to have profound anemia with a hemoglobin of 5.4 in the setting of acute on chronic anemia.  Therefore he was transfused 1 unit of packed RBCs after I discussed the risk and benefits with him.  He also has a significantly elevated INR in the setting of chronic liver disease and Xarelto use so he was given vitamin K 10 mg IV.  He was placed on Protonix and octreotide drips since he likely has an upper GI bleed relating to his alcoholic liver disease.  He does not have any known esophageal varices however.  His last Xarelto use was last night, and he is not actively bleeding here and he is hemodynamically normal, so I did not feel that [facility] was indicated at this time.  He was admitted to the [facility] under the care of the hospitalist Dr. Garcia.    Diagnosis:    ICD-10-CM    1. Gastrointestinal hemorrhage with melena  K92.1 ABO/Rh type and screen     Blood component     Blood component   2. Anemia due to blood loss, acute  D62    3. Elevated INR  R79.1    4. Alcoholic liver disease (H)  K70.9      Disposition:  Admitted to  Dr. Garcia, [facility] bed.    Scribe Disclosure:  I, Jayme Harrison, am serving as a scribe at 9:41 AM on 3/31/2020 to document services personally performed by Nuha He MD based on my observations and the provider's statements to me.      3/31/2020    EMERGENCY DEPARTMENT      Nuha He MD  03/31/20 1118

## 2020-03-31 NOTE — CONSULTS
"[facility] - Digestive [facility] Consultation     Antonio Ramirez  [address]   [place] [address]  75 year old male     Admission Date/Time: 3/31/2020  Primary Care Provider: Main Hardy  Referring / Attending Physician:  Radha     We were asked to see the patient in consultation by Dr. Garcia for evaluation of GIB in a cirrhotic patient.    ASSESSMENT:    1. Sub-acute GI bleed, without evidence of hemodynamic compromise/shock  2. Blood loss, microcytic anemia  -DDx for anemia, hematochezia more likely LGI source, however esophagitis, portal HTN gastropathy, Dieulafoy, ectopic varix, diverticular, neoplasm, proctitis, hemorrhoid etc all possible.  Does not seem likely to be acute variceal bleed, however.   3. Decompensated etoh cirrhosis, ascites on torsemide 40mg daily and aldactone 50mg, QOW paracentesis  4. Ongoing etoh use  5. Severe pulm HTN and ischemic CM  6. CKD  7. Anticoagulation, h/o PE, APLAS  8. Remote h/o prostate Ca, XRT    Though this patient tolerated moderate (\"conscious\") sedation for EGD last year, his severe pulm HTN is an indication for MAC vs general.  Discussed with him that endoscopic evaluation would preferably include EGD and colonoscopy given his risks, presentation and DDx.   He was reluctant to pursue this, so a long conversation regarding the risks of EGD, flex sig, colonoscopy ensued, after which he agreed to proceed.     RECOMMENDATIONS:  Agree with holding ASA and Xarelto.     -Given cirrhosis, known portal HTN gastropathy, rec stopping ASA.   Please transfuse to goal Hgb 7-8  Etoh withdrawal precautions   Consider adding ceftriaxone given GIB in cirrhotic, agree with PPI/octreotide  Clear liquids until MN  Colonoscopy prep tonight - orders placed  Recheck Hgb early AM   EGD/colonoscopy tomorrow     Discussed with medicine service.     Emeka Freeman,    [facility] - Digestive Health  Cell [phone]  ________________________________________________________________________      " "  CC: rectal bleeding     HPI:  Antonio Ramirez is a 75 year old male who I'm asked to see for rectal bleeding.      The patient is familiar to our service, established in Liver clinic with Dr. Raegan Mckeon, previous EGDs under chart review in 2019 x 2, who presented to the hospital with \"7-10 days of rectal bleeding.\"  He states that ever since his amputation his stools have been loose, usually 1-2 times per day, brown.  Over the last 1-2 wks however, each stool he's had, about twice a day, have contained red and or red-maroon blood.  He has not had abd pain, but did have emesis x2 over the last 2 days - though this looked \"dark brown.\"  Is on xarelto and ASA, with a significant cardiac hx.  Found to have microcytic anemia 5.4 (MCV 74) vs 7.7 March 9th.      Appropriately, the medicine service started PPI, octreotide.      ROS: A comprehensive ten point review of systems was negative aside from those in mentioned in the HPI.      PAST MEDICAL HISTORY:  Patient Active Problem List    Diagnosis Date Noted     Rectal bleeding 03/31/2020     Priority: Medium     Long term (current) use of anticoagulants 10/28/2019     Priority: Medium     Peripheral neuropathy, idiopathic 10/28/2019     Priority: Medium     Last Assessment & Plan:   Severe peripheral neuropathy    HX PE  INR 2.38    Anemia  Hgb 8.1 (10/16/13)       Personal history of malignant neoplasm of prostate 10/28/2019     Priority: Medium     Overview:   Treated with interstial radioactive seeds 2001.  No evidence of recurrence.       Phlebitis and thrombophlebitis of superficial vessels of lower extremities 10/28/2019     Priority: Medium     Overview:   Superficial phlebitis in right GSV, with extension into right common femoral vein.  S/P Right SFJ ligation with exploration of right CFV to extract clot on 11/3/2006.       Pulmonary embolism (H) 10/28/2019     Priority: Medium     Overview:   .       Ulcer of left lower extremity, limited to breakdown of " skin (H) 10/28/2019     Priority: Medium     Lymphedema of both lower extremities 10/28/2019     Priority: Medium     MSSA bacteremia 03/15/2019     Priority: Medium     H/O fall, recent 03/15/2019     Priority: Medium     Hematoma of left lower extremity, subsequent encounter 03/15/2019     Priority: Medium     Supratherapeutic INR 03/15/2019     Priority: Medium     H/O deep venous thrombosis 03/15/2019     Priority: Medium     History of pulmonary embolism 03/15/2019     Priority: Medium     Essential hypertension 03/15/2019     Priority: Medium     Chronic systolic congestive heart failure (H) 03/15/2019     Priority: Medium     CKD (chronic kidney disease) stage 3, GFR 30-59 ml/min (H) 03/15/2019     Priority: Medium     Phantom limb pain (H) 03/15/2019     Priority: Medium     Debility 03/15/2019     Priority: Medium     Traumatic hematoma of buttock 02/28/2019     Priority: Medium     Acute blood loss anemia 02/28/2019     Priority: Medium     CRF (chronic renal failure), stage 3 (moderate) (H) 02/28/2019     Priority: Medium     Acute cystitis without hematuria 12/11/2018     Priority: Medium     S/P Left BKA 2017 12/06/2018     Priority: Medium     S/P total hip arthroplasty 11/27/2018     Priority: Medium     Anemia      Priority: Medium     CAD, S/P CABG in 2007      Priority: Medium     CABG 2007: SVG to LAD, SVG to diagonal, SVG to OM; cardiac cath 5/17/18: thrombectomy for restenosis of stents-sent for urgent CABG, cath 5/14/2007: GRECIA x2 to LAD, Grecia to diagonal       Aortic stenosis      Priority: Medium     Nonrheumatic mitral valve regurgitation      Priority: Medium     Nonrheumatic tricuspid valve regurgitation      Priority: Medium     Pulmonary hypertension (H)      Priority: Medium     Paroxysmal atrial fibrillation (H)      Priority: Medium     Ischemic cardiomyopathy      Priority: Medium     Venous thrombosis      Priority: Medium     IVC filter and lysis procedures 2007       Calculi, ureter  08/14/2018     Priority: Medium     Malabsorption of iron 06/08/2018     Priority: Medium     Benign essential hypertension 04/27/2018     Priority: Medium     Thrombocytopenia -- suspect related to alcohol use 11/29/2017     Priority: Medium     Statin intolerance 09/12/2017     Priority: Medium     Vitamin D deficiency 09/12/2017     Priority: Medium     Prostate cancer -- S/P Radiation Seed implants 2000 (no problems since) 05/24/2017     Priority: Medium     Antiphospholipid antibody syndrome (H) 05/24/2017     Priority: Medium     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 (H) 05/24/2017     Priority: Medium     Renal dysfunction 05/06/2017     Priority: Medium     Last Assessment & Plan:   In May 2016, patient had renal dysfunction.  His peak creatinine was 2.16  It appears his baseline now is around 1.3 with a GFR of 52.1  Patient is not currently on ACE or ARB, recommend reconsideration for the introduction       S/P CABG (coronary artery bypass graft) 04/12/2017     Priority: Medium     Last Assessment & Plan:   Patient with a known history of coronary artery disease status post bypass surgery.  Patient is currently without any complaints of chest pain or chest pressure.  MR, patient had Lexiscan stress test March 2017 which showed a fixed apical and distal anterior, septal and inferior wall defect with an ejection fraction of 45%.  Cardiac catheterization from April 2015 -   1. Saphenous Vein Graft to the First Obtuse Marginal Branch:  This graft is     of medium caliber and fills the first and second obtuse marginal     branches and the distal circumflex.  This graft is free of disease.  2. Saphenous Vein Graft to the LAD:  This graft is of medium caliber and     fills the distal LAD.  This graft is free of disease.  3. Saphenous Vein Graft to the Diagonal Branch:  This graft is of medium     caliber and fills the diagonal branch.  This graft is free of disease.  Recommendations are for continued  medical management       Lymphoproliferative disorder, low grade B cell (H) 09/07/2016     Priority: Medium     Overview:   Dating from 2012  Dr Ventura       Therapeutic drug monitoring 05/22/2016     Priority: Medium     Last Assessment & Plan:   Patient is on ceftriaxone . Patient is screened for potential adverse reactions including but not limited to: diarrhea (5.6%), thrombocytosis (5.1%), eosinophilia (6%)   . None are identified.  Patient is tolerating medication well.  Will continue to monitor now and if treatment is carried on on an outpatient basis.    This involves close monitoring of patients's antimicrobial treatment, it's efficacy, tolerance, adverse effects, potential drug to drug interactions, microbial MICs and length of treatment.   All microbiological data was reviewed from prior admissions, and the decision regarding the appropriateness of antibacterial therapy is based on current clinical situation and review of complex laboratory data.        Altered mental status 05/21/2016     Priority: Medium     Last Assessment & Plan:   No recurrence of his hallucination  Completely lucid at this point  Ammonia level slightly elevated at 39  Recommend lactulose  Recommend rifaximin  No evidence of infection  Continue ceftriaxone       Hepatic cirrhosis (H) 05/21/2016     Priority: Medium     Last Assessment & Plan:   Secondary to alcoholic liver disease  Monitor labs intermittently  Correct coagulopathy as needed  Paracentesis as needed (no need now); last 5/4-5 liters removed  Not encephalopathic on lactulose   Cont  Aldactone and lasix  Remains compensated  Last Assessment & Plan:   Ammonia level slightly elevated at 39  Recommend lactulose  Recommend rifaximin       Overweight (BMI 25.0-29.9) 05/21/2016     Priority: Medium     Coagulopathy (H) 04/29/2016     Priority: Medium     Last Assessment & Plan:   Due to liver disease  No bleed       Osteomyelitis (H) 04/27/2016     Priority: Medium     Last  Assessment & Plan:   Stump is completely healed  No evidence of ongoing infection  No need for further antibiotics  Continue Rocephin       Ascites due to alcoholic cirrhosis (H) 04/20/2016     Priority: Medium     Last Assessment & Plan:   - prn paracentesis       Bursitis of left knee 04/20/2016     Priority: Medium     Last Assessment & Plan:   Status post BKA left       Amputation stump complication (H) 04/18/2016     Priority: Medium     Last Assessment & Plan:   BURSITS - RESECTED   PSA infection   picc placed   Wound care   OPAT - 4 weeks Cefepime   Last Assessment & Plan:   - healing well       Hx MRSA infection 11/16/2015     Priority: Medium     Overview:   MRSA + Stump 11-12-15       Abnormal liver function test 04/28/2015     Priority: Medium     Alcohol abuse 09/24/2014     Priority: Medium     Problem list name updated by automated process. Provider to review       Alcoholic cirrhosis of liver (H) 09/24/2014     Priority: Medium     Chronic kidney disease, stage III (moderate) (H) 09/24/2014     Priority: Medium     Physical deconditioning 09/24/2014     Priority: Medium     Calculus of kidney 09/10/2014     Priority: Medium     Last Assessment & Plan:     Reading 1.7  Avoiding nephrotoxic antimicrobials. Following Cr Cl and adjusting doses accordingly.     Overview:   Bilateral non obstructing renal calculi seen incidentally on abdominal CT 8/2014       Monoclonal gammopathy 09/10/2014     Priority: Medium     Overview:   Dr Ventura       Neuropathy 06/20/2014     Priority: Medium     Arthritis of knee, degenerative 06/20/2014     Priority: Medium     PVD (peripheral vascular disease) (H) 01/09/2014     Priority: Medium     Open wound of foot except toes with tendon involvement 06/06/2013     Priority: Medium     Overview:   Staged procedure pending application external fixator  July 2015 IMO Update    Last Assessment & Plan:   S/p left BKA revision  Post-op pain control good  Can not use knee  "scooter  Plan d/c today delayed  Failed PT with scooter/ very unsafe  Will need [facility] rehab 1 week prior to going home  Will need picc line for 4  weeks IV antibiotics  maxipime 2 grams bid  Knee immobilizer in place  D/c coumadin  s/p paracentesis for ascities / 3 liters  Plan d/c to H rehab today       Thoracic aortic aneurysm (TAA) (H) 2010     Priority: Medium     Overview:   Ascending aorta 4.5 cm on echocardiogram 2016       Balance problem 2009     Priority: Medium     SOCIAL HISTORY:  Social History     Tobacco Use     Smoking status: Never Smoker     Smokeless tobacco: Never Used   Substance Use Topics     Alcohol use: Yes     Comment: quit 10/2015; now abt 1-3 vodka's per night     Drug use: No     FAMILY HISTORY:  Family History   Problem Relation Age of Onset     Lung Cancer Mother      Heart Failure Father          age 87     ALLERGIES:   Allergies   Allergen Reactions     Kdc:Minocycline+[place] Blue Fcf GI Disturbance     16-PT IS NOT AWARE OF THIS REACTION     Ciprofloxacin Itching     Severe itching \"like ants were crawling\"     Hmg-Coa-R Inhibitors Other (See Comments)     Rhabdomyolysis occurred within a couple days  Rhabdomyolysis     MEDICATIONS:   Current Facility-Administered Medications   Medication     acetaminophen (TYLENOL) tablet 650 mg     bisacodyl (DULCOLAX) Suppository 10 mg     gabapentin (NEURONTIN) tablet 600 mg     lidocaine (LMX4) cream     lidocaine 1 % 0.1-1 mL     melatonin tablet 1 mg     naloxone (NARCAN) injection 0.1-0.4 mg     nitroGLYcerin (NITROSTAT) sublingual tablet 0.4 mg     octreotide (sandoSTATIN) 1,250 mcg in sodium chloride 0.9 % 250 mL     ondansetron (ZOFRAN-ODT) ODT tab 4 mg    Or     ondansetron (ZOFRAN) injection 4 mg     pantoprazole (PROTONIX) 80 mg in sodium chloride 0.9 % 100 mL infusion     polyethylene glycol (MIRALAX) Packet 17 g     prochlorperazine (COMPAZINE) injection 5 mg    Or     prochlorperazine (COMPAZINE) tablet " 5 mg    Or     prochlorperazine (COMPAZINE) Suppository 12.5 mg     sodium chloride (PF) 0.9% PF flush 3 mL     sodium chloride (PF) 0.9% PF flush 3 mL     sodium chloride 0.9% infusion     PHYSICAL EXAM:   /69   Pulse 78   Temp 97.6  F (36.4  C) (Oral)   Resp 21   Wt 100.7 kg (222 lb)   SpO2 98%   BMI 28.50 kg/m     GEN: Alert, oriented x3, communicative and in NAD.  KIKE: AT, anicteric, OP without erythema, exudate, or ulcers.    NECK: Supple.    LYMPH: No LAD noted.  HRT: RRR  LUNGS: CTA  ABD: ND, +BS, no guarding or pain to palpation, no rebound, no HSM.  SKIN: No rash, jaundice or spider angiomata  MSKL: Rt LE free of edema  NEURO: CN grossly intact, sensation intact to light touch, toes downgoing.     ADDITIONAL DATA:   I reviewed the patient's new clinical lab test results.   Recent Labs   Lab Test 03/31/20  1001 03/23/20  1306 03/09/20  1016 02/10/20  1258  09/27/19  1045   WBC 4.3  --  4.1  --   --  4.2   HGB 5.4*  --  7.7*  --   --  9.7*   MCV 74*  --  75*  --   --  89     --  184 160   < > 168   INR 3.92* 1.42*  --  2.34*   < > 3.00*    < > = values in this interval not displayed.     Recent Labs   Lab Test 03/31/20  1001 03/11/20  1436 03/11/20  1047 03/09/20  1016 02/10/20  1258   POTASSIUM 3.8 3.9 4.1 3.7 4.0   CHLORIDE 106  --   --  98 103   CO2 25  --   --  26 31   BUN 35* 26 25 28 38*   ANIONGAP 8  --   --  13.7 3     Recent Labs   Lab Test 03/31/20  1001 09/27/19  1045 06/19/19  0727  06/11/19  0220  03/03/19  1320 02/28/19  1635   ALBUMIN 2.6* 1.9* 2.1*   < >  --    < >  --   --    BILITOTAL 0.7 1.0 0.6   < >  --    < >  --   --    ALT 13 10 9   < >  --    < >  --   --    AST 23 27 20   < >  --    < >  --   --    PROTEIN  --   --   --   --  10*  --  10* 10*    < > = values in this interval not displayed.        I reviewed the patient's new imaging results.

## 2020-03-31 NOTE — PHARMACY-ADMISSION MEDICATION HISTORY
Pharmacy Medication History  Admission medication history interview status for the 3/31/2020  admission is complete. See EPIC admission navigator for prior to admission medications     Medication history sources: Patient, Surescripts and Care Everywhere  Medication history source reliability: Good  Adherence assessment: Moderate    Significant changes made to the medication list:  Noted that pt is filling Xarelto 20mg; clinic notes state 15mg, but pt confirms that he is on 20mg (old Rx)  -- not compliant with diuretic meds       Additional medication history information:       Medication reconciliation completed by provider prior to medication history? No    Time spent in this activity: 15 min      Prior to Admission medications    Medication Sig Last Dose Taking? Auth Provider   albuterol (PROAIR HFA/PROVENTIL HFA/VENTOLIN HFA) 108 (90 Base) MCG/ACT inhaler Inhale 2 puffs into the lungs every 6 hours as needed  Past Week at Unknown time Yes Unknown, Entered By History   aspirin 81 MG EC tablet Take 81 mg by mouth every evening  3/30/2020 at Unknown time Yes Reported, Patient   gabapentin (NEURONTIN) 600 MG tablet Take 600 mg by mouth 2 times daily 3/31/2020 at Unknown time Yes Reported, Patient   rivaroxaban ANTICOAGULANT (XARELTO) 20 MG TABS tablet Take 20 mg by mouth At Bedtime 3/30/2020 at Unknown time Yes Unknown, Entered By History   spironolactone (ALDACTONE) 50 MG tablet Take 1 tablet (50 mg) by mouth daily Past Week at Unknown time Yes More, Juli Day PA-C   torsemide (DEMADEX) 20 MG tablet Take 2 tablets (40 mg) by mouth every morning Past Week at Unknown time Yes More, Juli Day PA-C   rivaroxaban ANTICOAGULANT (XARELTO) 15 MG TABS tablet Take 1 tablet (15 mg) by mouth daily (with dinner) not started  Mariely Hogue MD

## 2020-03-31 NOTE — ED NOTES
Bed: ED01  Expected date: 3/31/20  Expected time: 9:28 AM  Means of arrival:   Comments:  Triage

## 2020-03-31 NOTE — PLAN OF CARE
Pt to [address] [facility] at 1215. Vital signs stable on RA. A/Ox4. LS clear. BS active. Abd distended, patient gets weekly paracentesis. Voiding adequately to urinal. Up A1. Prosthesis in place. No Bms since arrival to the floor.   CIWA 0, states last drink was around 1800 3/30   2 units PRBC transfused as ordered. Hgb recheck pending. Bowel prep started this evening.   Plan for EGD/colonoscopy tomorrow.     1830 addendum: hgb recheck 7.1, next check midnight, conditional transfuse orders in place. Oncoming nurse updated.

## 2020-03-31 NOTE — ED NOTES
"[facility]  ED Nurse Handoff Report    ED Chief complaint: Rectal Bleeding      ED Diagnosis:   Final diagnoses:   None       Code Status: Full Code    Allergies:   Allergies   Allergen Reactions     Kdc:Minocycline+[place] Blue Fcf GI Disturbance     11/01/16-PT IS NOT AWARE OF THIS REACTION     Ciprofloxacin Itching     Severe itching \"like ants were crawling\"     Hmg-Coa-R Inhibitors Other (See Comments)     Rhabdomyolysis occurred within a couple days  Rhabdomyolysis       Patient Story: Pt presents to the ED with black, tarry stools ongoing for the past 7-10 days. Reports intermittent N/V. Denies abdominal pain.   Focused Assessment:  Denies CP or SOB. A&O x4. Denies pain.    Treatments and/or interventions provided: see MAR for med administration in ED.  Patient's response to treatments and/or interventions: n/a    To be done/followed up on inpatient unit:  Follow up on blood admin. If not able to start in ED.    Does this patient have any cognitive concerns?: none    Activity level - Baseline/Home:  Independent  Activity Level - Current:   Independent    Patient's Preferred language: English   Needed?: No    Isolation: None  Infection: Not Applicable  Bariatric?: No    Vital Signs:   Vitals:    03/31/20 0939   BP: 100/62   Pulse: 95   Resp: 18   Temp: 97.9  F (36.6  C)   TempSrc: Oral   SpO2: 97%       Cardiac Rhythm:Cardiac Rhythm: Normal sinus rhythm    Was the PSS-3 completed:   Yes  What interventions are required if any?               Family Comments: n/a  OBS brochure/video discussed/provided to patient/family: No              Name of person given brochure if not patient: n/a              Relationship to patient: n/a    For the majority of the shift this patient's behavior was Green.   Behavioral interventions performed were n/a.    ED NURSE PHONE NUMBER: *[phone]         "

## 2020-04-01 ENCOUNTER — ANESTHESIA (OUTPATIENT)
Dept: GASTROENTEROLOGY | Facility: CLINIC | Age: 75
DRG: 299 | End: 2020-04-01
Payer: MEDICARE

## 2020-04-01 ENCOUNTER — ANESTHESIA EVENT (OUTPATIENT)
Dept: GASTROENTEROLOGY | Facility: CLINIC | Age: 75
DRG: 299 | End: 2020-04-01
Payer: MEDICARE

## 2020-04-01 ENCOUNTER — APPOINTMENT (OUTPATIENT)
Dept: NUCLEAR MEDICINE | Facility: CLINIC | Age: 75
DRG: 299 | End: 2020-04-01
Attending: INTERNAL MEDICINE
Payer: MEDICARE

## 2020-04-01 LAB
ALBUMIN SERPL-MCNC: 2.4 G/DL (ref 3.4–5)
ALP SERPL-CCNC: 67 U/L (ref 40–150)
ALT SERPL W P-5'-P-CCNC: 11 U/L (ref 0–70)
ANION GAP SERPL CALCULATED.3IONS-SCNC: 7 MMOL/L (ref 3–14)
AST SERPL W P-5'-P-CCNC: 25 U/L (ref 0–45)
BASOPHILS # BLD AUTO: 0.1 10E9/L (ref 0–0.2)
BASOPHILS NFR BLD AUTO: 1.5 %
BILIRUB SERPL-MCNC: 1.4 MG/DL (ref 0.2–1.3)
BLD PROD TYP BPU: NORMAL
BLD PROD TYP BPU: NORMAL
BLD UNIT ID BPU: 0
BLD UNIT ID BPU: 0
BLOOD PRODUCT CODE: NORMAL
BLOOD PRODUCT CODE: NORMAL
BPU ID: NORMAL
BPU ID: NORMAL
BUN SERPL-MCNC: 35 MG/DL (ref 7–30)
CALCIUM SERPL-MCNC: 8.4 MG/DL (ref 8.5–10.1)
CHLORIDE SERPL-SCNC: 103 MMOL/L (ref 94–109)
CO2 SERPL-SCNC: 25 MMOL/L (ref 20–32)
COLONOSCOPY: NORMAL
CREAT SERPL-MCNC: 1.69 MG/DL (ref 0.66–1.25)
DIFFERENTIAL METHOD BLD: ABNORMAL
EOSINOPHIL # BLD AUTO: 0.2 10E9/L (ref 0–0.7)
EOSINOPHIL NFR BLD AUTO: 4.4 %
ERYTHROCYTE [DISTWIDTH] IN BLOOD BY AUTOMATED COUNT: 20.2 % (ref 10–15)
GFR SERPL CREATININE-BSD FRML MDRD: 38 ML/MIN/{1.73_M2}
GLUCOSE SERPL-MCNC: 89 MG/DL (ref 70–99)
HCT VFR BLD AUTO: 23.6 % (ref 40–53)
HGB BLD-MCNC: 6.4 G/DL (ref 13.3–17.7)
HGB BLD-MCNC: 6.9 G/DL (ref 13.3–17.7)
HGB BLD-MCNC: 7.3 G/DL (ref 13.3–17.7)
HGB BLD-MCNC: 7.8 G/DL (ref 13.3–17.7)
IMM GRANULOCYTES # BLD: 0 10E9/L (ref 0–0.4)
IMM GRANULOCYTES NFR BLD: 0.2 %
INR PPP: 2.29 (ref 0.86–1.14)
LYMPHOCYTES # BLD AUTO: 0.5 10E9/L (ref 0.8–5.3)
LYMPHOCYTES NFR BLD AUTO: 10.6 %
MCH RBC QN AUTO: 24.4 PG (ref 26.5–33)
MCHC RBC AUTO-ENTMCNC: 30.9 G/DL (ref 31.5–36.5)
MCV RBC AUTO: 79 FL (ref 78–100)
MONOCYTES # BLD AUTO: 0.7 10E9/L (ref 0–1.3)
MONOCYTES NFR BLD AUTO: 14.8 %
NEUTROPHILS # BLD AUTO: 3.1 10E9/L (ref 1.6–8.3)
NEUTROPHILS NFR BLD AUTO: 68.5 %
NRBC # BLD AUTO: 0 10*3/UL
NRBC BLD AUTO-RTO: 0 /100
PLATELET # BLD AUTO: 129 10E9/L (ref 150–450)
POTASSIUM SERPL-SCNC: 3.8 MMOL/L (ref 3.4–5.3)
PROT SERPL-MCNC: 6 G/DL (ref 6.8–8.8)
RBC # BLD AUTO: 2.99 10E12/L (ref 4.4–5.9)
SODIUM SERPL-SCNC: 135 MMOL/L (ref 133–144)
TRANSFUSION STATUS PATIENT QL: NORMAL
UPPER GI ENDOSCOPY: NORMAL
WBC # BLD AUTO: 4.5 10E9/L (ref 4–11)

## 2020-04-01 PROCEDURE — 12000047 ZZH R&B IMCU

## 2020-04-01 PROCEDURE — 85018 HEMOGLOBIN: CPT | Performed by: PHYSICIAN ASSISTANT

## 2020-04-01 PROCEDURE — C9113 INJ PANTOPRAZOLE SODIUM, VIA: HCPCS | Performed by: PHYSICIAN ASSISTANT

## 2020-04-01 PROCEDURE — A9560 TC99M LABELED RBC: HCPCS | Performed by: INTERNAL MEDICINE

## 2020-04-01 PROCEDURE — 78278 ACUTE GI BLOOD LOSS IMAGING: CPT

## 2020-04-01 PROCEDURE — 37000009 ZZH ANESTHESIA TECHNICAL FEE, EACH ADDTL 15 MIN: Performed by: INTERNAL MEDICINE

## 2020-04-01 PROCEDURE — 0DJD8ZZ INSPECTION OF LOWER INTESTINAL TRACT, VIA NATURAL OR ARTIFICIAL OPENING ENDOSCOPIC: ICD-10-PCS | Performed by: INTERNAL MEDICINE

## 2020-04-01 PROCEDURE — 25000132 ZZH RX MED GY IP 250 OP 250 PS 637: Mod: GY | Performed by: PHYSICIAN ASSISTANT

## 2020-04-01 PROCEDURE — 34300033 ZZH RX 343: Performed by: INTERNAL MEDICINE

## 2020-04-01 PROCEDURE — 25000125 ZZHC RX 250: Performed by: NURSE ANESTHETIST, CERTIFIED REGISTERED

## 2020-04-01 PROCEDURE — 80053 COMPREHEN METABOLIC PANEL: CPT | Performed by: PHYSICIAN ASSISTANT

## 2020-04-01 PROCEDURE — 25000128 H RX IP 250 OP 636: Performed by: INTERNAL MEDICINE

## 2020-04-01 PROCEDURE — 85610 PROTHROMBIN TIME: CPT | Performed by: PHYSICIAN ASSISTANT

## 2020-04-01 PROCEDURE — 45378 DIAGNOSTIC COLONOSCOPY: CPT | Performed by: INTERNAL MEDICINE

## 2020-04-01 PROCEDURE — 25800030 ZZH RX IP 258 OP 636: Performed by: PHYSICIAN ASSISTANT

## 2020-04-01 PROCEDURE — 25000128 H RX IP 250 OP 636: Performed by: NURSE ANESTHETIST, CERTIFIED REGISTERED

## 2020-04-01 PROCEDURE — 36415 COLL VENOUS BLD VENIPUNCTURE: CPT | Performed by: PHYSICIAN ASSISTANT

## 2020-04-01 PROCEDURE — 25800030 ZZH RX IP 258 OP 636: Performed by: NURSE ANESTHETIST, CERTIFIED REGISTERED

## 2020-04-01 PROCEDURE — 25000128 H RX IP 250 OP 636: Performed by: PHYSICIAN ASSISTANT

## 2020-04-01 PROCEDURE — P9016 RBC LEUKOCYTES REDUCED: HCPCS | Performed by: EMERGENCY MEDICINE

## 2020-04-01 PROCEDURE — 99233 SBSQ HOSP IP/OBS HIGH 50: CPT | Performed by: PHYSICIAN ASSISTANT

## 2020-04-01 PROCEDURE — 40000010 ZZH STATISTIC ANES STAT CODE-CRNA PER MINUTE: Performed by: INTERNAL MEDICINE

## 2020-04-01 PROCEDURE — 37000008 ZZH ANESTHESIA TECHNICAL FEE, 1ST 30 MIN: Performed by: INTERNAL MEDICINE

## 2020-04-01 PROCEDURE — 85025 COMPLETE CBC W/AUTO DIFF WBC: CPT | Performed by: PHYSICIAN ASSISTANT

## 2020-04-01 PROCEDURE — 0DJ08ZZ INSPECTION OF UPPER INTESTINAL TRACT, VIA NATURAL OR ARTIFICIAL OPENING ENDOSCOPIC: ICD-10-PCS | Performed by: INTERNAL MEDICINE

## 2020-04-01 PROCEDURE — 43235 EGD DIAGNOSTIC BRUSH WASH: CPT | Performed by: INTERNAL MEDICINE

## 2020-04-01 PROCEDURE — 25000566 ZZH SEVOFLURANE, EA 15 MIN: Performed by: INTERNAL MEDICINE

## 2020-04-01 RX ORDER — LIDOCAINE HYDROCHLORIDE 20 MG/ML
INJECTION, SOLUTION INFILTRATION; PERINEURAL PRN
Status: DISCONTINUED | OUTPATIENT
Start: 2020-04-01 | End: 2020-04-01

## 2020-04-01 RX ORDER — ONDANSETRON 4 MG/1
4 TABLET, ORALLY DISINTEGRATING ORAL EVERY 30 MIN PRN
Status: DISCONTINUED | OUTPATIENT
Start: 2020-04-01 | End: 2020-04-01

## 2020-04-01 RX ORDER — FENTANYL CITRATE 50 UG/ML
25-50 INJECTION, SOLUTION INTRAMUSCULAR; INTRAVENOUS
Status: DISCONTINUED | OUTPATIENT
Start: 2020-04-01 | End: 2020-04-01 | Stop reason: HOSPADM

## 2020-04-01 RX ORDER — ONDANSETRON 2 MG/ML
4 INJECTION INTRAMUSCULAR; INTRAVENOUS EVERY 30 MIN PRN
Status: DISCONTINUED | OUTPATIENT
Start: 2020-04-01 | End: 2020-04-01

## 2020-04-01 RX ORDER — FENTANYL CITRATE 50 UG/ML
25-50 INJECTION, SOLUTION INTRAMUSCULAR; INTRAVENOUS
Status: DISCONTINUED | OUTPATIENT
Start: 2020-04-01 | End: 2020-04-01

## 2020-04-01 RX ORDER — PROPOFOL 10 MG/ML
INJECTION, EMULSION INTRAVENOUS PRN
Status: DISCONTINUED | OUTPATIENT
Start: 2020-04-01 | End: 2020-04-01

## 2020-04-01 RX ORDER — SODIUM CHLORIDE, SODIUM LACTATE, POTASSIUM CHLORIDE, CALCIUM CHLORIDE 600; 310; 30; 20 MG/100ML; MG/100ML; MG/100ML; MG/100ML
INJECTION, SOLUTION INTRAVENOUS CONTINUOUS PRN
Status: DISCONTINUED | OUTPATIENT
Start: 2020-04-01 | End: 2020-04-01

## 2020-04-01 RX ORDER — MEPERIDINE HYDROCHLORIDE 25 MG/ML
12.5 INJECTION INTRAMUSCULAR; INTRAVENOUS; SUBCUTANEOUS
Status: DISCONTINUED | OUTPATIENT
Start: 2020-04-01 | End: 2020-04-01

## 2020-04-01 RX ORDER — NALOXONE HYDROCHLORIDE 0.4 MG/ML
.1-.4 INJECTION, SOLUTION INTRAMUSCULAR; INTRAVENOUS; SUBCUTANEOUS
Status: DISCONTINUED | OUTPATIENT
Start: 2020-04-01 | End: 2020-04-01

## 2020-04-01 RX ORDER — SODIUM CHLORIDE, SODIUM LACTATE, POTASSIUM CHLORIDE, CALCIUM CHLORIDE 600; 310; 30; 20 MG/100ML; MG/100ML; MG/100ML; MG/100ML
INJECTION, SOLUTION INTRAVENOUS CONTINUOUS
Status: DISCONTINUED | OUTPATIENT
Start: 2020-04-01 | End: 2020-04-01

## 2020-04-01 RX ORDER — EPHEDRINE SULFATE 50 MG/ML
INJECTION, SOLUTION INTRAMUSCULAR; INTRAVENOUS; SUBCUTANEOUS PRN
Status: DISCONTINUED | OUTPATIENT
Start: 2020-04-01 | End: 2020-04-01

## 2020-04-01 RX ORDER — HYDROMORPHONE HYDROCHLORIDE 1 MG/ML
.3-.5 INJECTION, SOLUTION INTRAMUSCULAR; INTRAVENOUS; SUBCUTANEOUS EVERY 10 MIN PRN
Status: DISCONTINUED | OUTPATIENT
Start: 2020-04-01 | End: 2020-04-01

## 2020-04-01 RX ORDER — ONDANSETRON 2 MG/ML
INJECTION INTRAMUSCULAR; INTRAVENOUS PRN
Status: DISCONTINUED | OUTPATIENT
Start: 2020-04-01 | End: 2020-04-01

## 2020-04-01 RX ORDER — PROPOFOL 10 MG/ML
INJECTION, EMULSION INTRAVENOUS CONTINUOUS PRN
Status: DISCONTINUED | OUTPATIENT
Start: 2020-04-01 | End: 2020-04-01

## 2020-04-01 RX ADMIN — SODIUM CHLORIDE, SODIUM LACTATE, POTASSIUM CHLORIDE, CALCIUM CHLORIDE: 600; 310; 30; 20 INJECTION, SOLUTION INTRAVENOUS at 12:30

## 2020-04-01 RX ADMIN — ONDANSETRON 4 MG: 2 INJECTION INTRAMUSCULAR; INTRAVENOUS at 12:50

## 2020-04-01 RX ADMIN — PHENYLEPHRINE HYDROCHLORIDE 200 MCG: 10 INJECTION INTRAVENOUS at 12:32

## 2020-04-01 RX ADMIN — FOLIC ACID 1 MG: 1 TABLET ORAL at 08:08

## 2020-04-01 RX ADMIN — SUCCINYLCHOLINE CHLORIDE 100 MG: 20 INJECTION, SOLUTION INTRAMUSCULAR; INTRAVENOUS; PARENTERAL at 12:31

## 2020-04-01 RX ADMIN — SODIUM CHLORIDE 8 MG/HR: 9 INJECTION, SOLUTION INTRAVENOUS at 10:36

## 2020-04-01 RX ADMIN — GABAPENTIN 600 MG: 600 TABLET, FILM COATED ORAL at 21:19

## 2020-04-01 RX ADMIN — PROPOFOL 150 MG: 10 INJECTION, EMULSION INTRAVENOUS at 12:31

## 2020-04-01 RX ADMIN — LIDOCAINE HYDROCHLORIDE 100 MG: 20 INJECTION, SOLUTION INFILTRATION; PERINEURAL at 12:31

## 2020-04-01 RX ADMIN — SODIUM CHLORIDE: 9 INJECTION, SOLUTION INTRAVENOUS at 16:59

## 2020-04-01 RX ADMIN — PHENYLEPHRINE HYDROCHLORIDE 100 MCG: 10 INJECTION INTRAVENOUS at 12:44

## 2020-04-01 RX ADMIN — Medication 27 MILLICURIE: at 14:46

## 2020-04-01 RX ADMIN — Medication 5 MG: at 12:54

## 2020-04-01 RX ADMIN — Medication 5 MG: at 13:05

## 2020-04-01 RX ADMIN — MULTIPLE VITAMINS W/ MINERALS TAB 1 TABLET: TAB at 08:08

## 2020-04-01 RX ADMIN — THIAMINE HCL TAB 100 MG 100 MG: 100 TAB at 08:08

## 2020-04-01 RX ADMIN — SODIUM CHLORIDE 8 MG/HR: 9 INJECTION, SOLUTION INTRAVENOUS at 21:19

## 2020-04-01 RX ADMIN — PROPOFOL 75 MCG/KG/MIN: 10 INJECTION, EMULSION INTRAVENOUS at 12:37

## 2020-04-01 RX ADMIN — PHENYLEPHRINE HYDROCHLORIDE 100 MCG: 10 INJECTION INTRAVENOUS at 12:54

## 2020-04-01 RX ADMIN — PHENYLEPHRINE HYDROCHLORIDE 100 MCG: 10 INJECTION INTRAVENOUS at 12:57

## 2020-04-01 RX ADMIN — PHENYLEPHRINE HYDROCHLORIDE 200 MCG: 10 INJECTION INTRAVENOUS at 13:02

## 2020-04-01 RX ADMIN — PHENYLEPHRINE HYDROCHLORIDE 200 MCG: 10 INJECTION INTRAVENOUS at 12:50

## 2020-04-01 RX ADMIN — PHENYLEPHRINE HYDROCHLORIDE 100 MCG: 10 INJECTION INTRAVENOUS at 13:05

## 2020-04-01 RX ADMIN — GABAPENTIN 600 MG: 600 TABLET, FILM COATED ORAL at 08:08

## 2020-04-01 RX ADMIN — CEFTRIAXONE 2 G: 2 INJECTION, POWDER, FOR SOLUTION INTRAMUSCULAR; INTRAVENOUS at 16:58

## 2020-04-01 RX ADMIN — OCTREOTIDE ACETATE 50 MCG/HR: 200 INJECTION, SOLUTION INTRAVENOUS; SUBCUTANEOUS at 19:52

## 2020-04-01 ASSESSMENT — ENCOUNTER SYMPTOMS
DYSRHYTHMIAS: 1
SEIZURES: 0

## 2020-04-01 ASSESSMENT — ACTIVITIES OF DAILY LIVING (ADL)
ADLS_ACUITY_SCORE: 17
ADLS_ACUITY_SCORE: 19
ADLS_ACUITY_SCORE: 16

## 2020-04-01 ASSESSMENT — LIFESTYLE VARIABLES: TOBACCO_USE: 0

## 2020-04-01 ASSESSMENT — COPD QUESTIONNAIRES: COPD: 0

## 2020-04-01 NOTE — PLAN OF CARE
Alert and oriented x4. Vital signs stable on room air. Assist of 1 + gait belt and walker. NPO. Lung sounds clear. Bowel sounds active, + flatus, BM today. Adequate urine output. 2+ pitting edema to RLE. Blanchable erythema to coccyx - pulsate mattress in use. Denies pain and nausea. Tele a-fib CVR. CIWA = 0.  PRBC infusing for Hgb 6.9.

## 2020-04-01 NOTE — PLAN OF CARE
Pt is A&O x 4, AVSS on roomair, monitor shows afib with cvr.  Pt continue to drink GoLYTELY and is having bloody stools.   He received 1 unit of blood for hgb of 6.4, hgb up 7.3 this morning.  NPO for EGD and colonoscopy.

## 2020-04-01 NOTE — PROGRESS NOTES
GI     EGD/colonoscopy performed today for overt obscure bleeding in pt with decompensated etoh cirrhosis, CKD, afib (xarelto), ischemic CM and severe pulm HTN.     Findings support overt small bowel bleeding - relatively normal EGD, red blood throughout the colon with red blood seen upon intubation of the TI.  DDx for small bowel bleeding would include AVM or ectopic varix.  At least 1 large rectal varix was seen, but large amount of clotted blood in the rectum obscuring adequate visualization.     CTA would be great option for above DDx given what appears to be fairly brisk bleed, but GFR precludes this.  Pillcam would be an option, but the earliest this could be done would be tomorrow.  Tagged cell scan might therefore be the best test, and if positive with bleeding in the small bowel would likely need IR evaluation for embolization vs BRTO vs TIPS.     Please keep the pt NPO, continue PPI, abx, octreotide.     Above communicated with medicine team.   Left a message with IR to call me back given pending tagged cell scan.     Emeka Freeman DO   [facility] - Digestive Health  Cell [phone]

## 2020-04-01 NOTE — ANESTHESIA POSTPROCEDURE EVALUATION
Patient: Antonio Ramirez    Procedure(s):  ESOPHAGOGASTRODUODENOSCOPY (EGD)  COLONOSCOPY    Diagnosis:GI bleed [K92.2]  Diagnosis Additional Information: No value filed.    Anesthesia Type:  General    Note:  Anesthesia Post Evaluation    Patient location during evaluation: PACU  Patient participation: Able to fully participate in evaluation  Level of consciousness: awake  Pain management: adequate  Airway patency: patent  Cardiovascular status: acceptable  Respiratory status: acceptable  Hydration status: acceptable  PONV: none             Last vitals:  Vitals:    04/01/20 1327 04/01/20 1330 04/01/20 1335   BP: 122/67 116/54    Pulse: 75 77    Resp: 18 19    Temp: 37.1  C (98.7  F)     SpO2: 100% 100% 100%         Electronically Signed By: Leandro Mata MD  April 1, 2020  1:48 PM

## 2020-04-01 NOTE — PROVIDER NOTIFICATION
"Dr. Raphael text-paged, \"FYI variable QT-interval. Longest = 0.64, average = 0.44\"    Update: Dr. Raphael advised to continue to monitor  "

## 2020-04-01 NOTE — OR NURSING
Nuc Med to bedside to draw blood for test. Pt has sign out from Dr. Mata. Will transfer to Nuc [facility] and then back to  after.

## 2020-04-01 NOTE — PROVIDER NOTIFICATION
"Dr. Raphael text-paged, \"Critical lab value: hemoglobin = 6.9\"    Update: advised to follow conditional orders and infuse 1 unit blood  "

## 2020-04-01 NOTE — ANESTHESIA PREPROCEDURE EVALUATION
Anesthesia Pre-Procedure Evaluation    Patient: Antonio Ramirez   MRN: [id number] : 1945          Preoperative Diagnosis: GI bleed [K92.2]    Procedure(s):  ESOPHAGOGASTRODUODENOSCOPY (EGD)  COLONOSCOPY    Past Medical History:   Diagnosis Date     Alcoholism (H)      Amputated left leg (H)      Anemia      Anticardiolipin antibody syndrome (H)      Antiphospholipid antibody syndrome (H)      Antiplatelet or antithrombotic long-term use      Aortic root dilatation (H)      Aortic stenosis      Arthritis      Balance problem      Coronary artery disease     CABG 2007: SVG to LAD, SVG to diagonal, SVG to OM; cardiac cath 18: thrombectomy for restenosis of stents-sent for urgent CABG, cath 2007: GRECIA x2 to LAD, Grecia to diagonal     Gastro-oesophageal reflux disease      Heart attack (H)     apparently arrested, cardiac X5     Hiatal hernia      Hypercholesterolemia      Hypertension      Ischemic cardiomyopathy      Left foot drop      Liver disease     alcoholic liver disease, alcoholic cirrohsosis, portal hypertension     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)     ?When diagnosed, patient not aware of this     Moderate mitral regurgitation      Monoclonal gammopathy      Neuropathy      Noninfectious ileitis     diverticulitis of colon     Nonrheumatic tricuspid valve regurgitation      Paroxysmal atrial fibrillation (H)      PE (pulmonary embolism) 2006    saddle emboli 2006     Prostate cancer (H)     Treated with radiation (seed implants in ) -- no problems since     Pulmonary hypertension (H)      Renal disease     kidney stones     Syncope      Thrombocytopenia (H)      Urethral stricture      Venous thrombosis     IVC filter and lysis procedures      Past Surgical History:   Procedure Laterality Date     AMPUTATE LEG BELOW KNEE Left 2014    related to gangrene, started as foot ulcer related to neuropathy     AMPUTATE LEG BELOW KNEE Left 2017    Procedure:  AMPUTATE LEG BELOW KNEE;  Exploration of Left Leg Below Knee Amputation Stump with Ligation of Bleeders.;  Surgeon: Leandro Arreola MD;  Location:  OR     APPENDECTOMY       APPLY WOUND VAC Left 12/6/2017    Procedure: APPLY WOUND VAC;;  Surgeon: Saima Howard MD;  Location:  SD     ARTHROPLASTY HIP Right 11/27/2018    Procedure: RIGHT TOTAL HIP ARTHROPLASTY;  Surgeon: Saima Howard MD;  Location:  OR     ARTHROPLASTY KNEE Right 9/19/2014    Procedure: ARTHROPLASTY KNEE;  Surgeon: Saima Howard MD;  Location:  OR     CARDIAC SURGERY      CABG     CHOLECYSTECTOMY       COLONOSCOPY       COMBINED CYSTOSCOPY, RETROGRADES, EXCHANGE STENT URETER(S) Left 7/24/2018    Procedure: COMBINED CYSTOSCOPY, RETROGRADES, EXCHANGE STENT URETER(S);  CYSTOSCOPY, BILATERAL RETROGRADES, BILATERAL URETERAL STENT EXCHANGE ;  Surgeon: Matt Cervantes MD;  Location:  OR     CRANIOTOMY Right 4/7/2018    Procedure: CRANIOTOMY;  Right Craniotomy For Subdural Hematoma;  Surgeon: Jono Issa MD;  Location:  OR     CYSTOSCOPY, DILATE URETHRA, COMBINED N/A 10/12/2016    Procedure: COMBINED CYSTOSCOPY, DILATE URETHRA;  Surgeon: Dimitry Bello MD;  Location:  OR     CYSTOSCOPY, REMOVE STENT(S), COMBINED Right 7/24/2018    Procedure: COMBINED CYSTOSCOPY, REMOVE STENT(S);;  Surgeon: Jose Hunter MD;  Location:  OR     ENDOSCOPIC STRIPPING VEIN(S)       esophagogastroduodenoscopy       ESOPHAGOSCOPY, GASTROSCOPY, DUODENOSCOPY (EGD), COMBINED N/A 3/11/2019    Procedure: COMBINED ESOPHAGOSCOPY, GASTROSCOPY, DUODENOSCOPY (EGD), BIOPSY SINGLE OR MULTIPLE;  Surgeon: Katherin Boyd MD;  Location:  GI     ESOPHAGOSCOPY, GASTROSCOPY, DUODENOSCOPY (EGD), COMBINED N/A 9/27/2019    Procedure: ESOPHAGOGASTRODUODENOSCOPY, WITH FOREIGN BODY REMOVAL;  Surgeon: Raegan Mckeon MD;  Location:  GI     EYE SURGERY      cataracts     GENITOURINARY SURGERY      Prostate Seen Implants      HERNIA REPAIR      Umbilical     INCISION AND DRAINAGE LOWER EXTREMITY, COMBINED Left 12/1/2017    Procedure: COMBINED INCISION AND DRAINAGE LOWER EXTREMITY;  INCISION AND DRAINAGE OF LEFT BELOW KNEE AMPUTATION ABSCESS, WOUND VAC PLACEMENT;  Surgeon: Saima Howard MD;  Location:  OR     IR IVC FILTER PLACEMENT       IRRIGATION AND DEBRIDEMENT LOWER EXTREMITY, COMBINED Left 12/6/2017    Procedure: COMBINED IRRIGATION AND DEBRIDEMENT LOWER EXTREMITY;  IRRIGATION AND DEBRIDEMENT OF LEFT BELOW KNEE AMPUTATION SITE WITH WOUND VAC REPLACEMENT;  Surgeon: Saima Howard MD;  Location:  SD     IRRIGATION AND DEBRIDEMENT LOWER EXTREMITY, COMBINED Left 12/8/2017    Procedure: COMBINED IRRIGATION AND DEBRIDEMENT LOWER EXTREMITY;  IRRIGATION AND DEBRIDEMENT OF LEFT BELOW THE KNEE AMPUTATION AND SHORTENING OF THE TIBIA WITH WOUND CLOSURE;  Surgeon: Saima Howard MD;  Location:  OR     LASER HOLMIUM LITHOTRIPSY URETER(S), INSERT STENT, COMBINED Bilateral 6/28/2018    Procedure: COMBINED CYSTOSCOPY, URETEROSCOPY, LASER HOLMIUM LITHOTRIPSY URETER(S), INSERT STENT;  CYSTOSCOPY, BILATERAL URETEROSCOPY,  HOLMIUM LASER LITHOTRIPSY, BILATERAL STENT PLACEMENT, STONE BASKETING;  Surgeon: Jose Hunter MD;  Location:  OR     LASER HOLMIUM LITHOTRIPSY URETER(S), INSERT STENT, COMBINED Left 8/14/2018    Procedure: COMBINED CYSTOSCOPY, URETEROSCOPY, LASER HOLMIUM LITHOTRIPSY URETER(S), INSERT STENT;  CYSTOSCOPY, LEFT URETEROSCOPY, LEFT LASER HOLMIUM LITHOTRIPSY URETER(S) REMOVAL BILATERAL STENTS;  Surgeon: Jose Hunter MD;  Location:  OR     ORTHOPEDIC SURGERY      (R) knee scope     ORTHOPEDIC SURGERY      total hip      ORTHOPEDIC SURGERY      elbow surgery       Anesthesia Evaluation     . Pt has had prior anesthetic. Type: General (Glidescope in past)    No history of anesthetic complications          ROS/MED HX    ENT/Pulmonary:      (-) tobacco use, asthma and COPD   Neurologic:     (+)neuropathy  - L foot drop, other neuro Recent subdural hematoma s/p decompressive craniotomy - 4/2018   (-) seizures, CVA and TIA   Cardiovascular:     (+) hypertension--CAD, -past MI,CABG-stent,2007  2 Drug Eluting Stent .. Taking blood thinners Pt has received instructions: Instructions Given to patient: Xarelto. CHF etiology: Ischemic Last EF: 40 date: 9/23/19 . fainting (syncope). :. dysrhythmias a-fib, valvular problems/murmurs type: AS and MR severe AS, mod MR, mod-severe TR:. pulmonary hypertension (Severe), Previous cardiac testing Echodate:9/23/19results:Left ventricular systolic function is moderately reduced. The visual ejection fraction is estimated at 40-45%. LVEF 41% based on biplane 2D tracing. There is severe distal anterior, mid-distal septal, and apical wall hypokinesis. Severe hypokinesis of the distal inferior wall. The right ventricle is moderately dilated. Mildly decreased right ventricular systolic function The left atrium is severely dilated. The right atrium is mild to moderately dilated. There is moderate mitral annular calcification. There is mild to moderate (1-2+) mitral regurgitation. There is mild mitral stenosis. The mean mitral valve gradient is 4mmHg at heart rate of 69 bpm. There is mild (1+) tricuspid regurgitation. Severe (>55mmHg) pulmonary hypertension is present. Flattened septum is consistent with RV pressure overload. There is sclerosis, calcification, and restriction of the aortic valve opening compatible with moderate aortic stenosis. The calculated aortic valve area is 1.5cm2. The mean AoV pressure gradient is 18mmHg. Mild aortic root dilatation (4.0 cm). The ascending aorta is mildly dilated (4.3 cm). The rhythm was atrial fibrillation with controlled ventricular rate at rest. Compared to the study of 6/11/2019, pulmonary pressure is marginally lower with LVEF slightly higher. Aortic valvular gradients are unchanged. No change in aortic root or ascending aortic enlargement.date:  results:ECG reviewed date:3/31/20 results:A-fib date: results:          METS/Exercise Tolerance:  1 - Eating, dressing   Hematologic:     (+) History of blood clots (PE) pt is anticoagulated, Anemia, Other Hematologic Disorder-Cirrhosis related thrombocytopenia; Antiphospholipid Ab      Musculoskeletal:   (+) arthritis,  other musculoskeletal- s/p LLE amputation      GI/Hepatic:     (+) GERD hiatal hernia, hepatitis type Alcoholic, liver disease,       Renal/Genitourinary:     (+) chronic renal disease, type: CRI,       Endo: Comment: BMI 30    (+) Obesity (Class 1), .   (-) Type I DM and Type II DM   Psychiatric:     (+) psychiatric history other (comment) (EtOH abuse)      Infectious Disease:         Malignancy:   (+) Malignancy History of Prostate and Lymphoma/Leukemia          Other:    (+) H/O Chronic Pain,H/O chronic opiod use , other significant disability Other (comment)                        Physical Exam  Normal systems: pulmonary and dental    Airway   Mallampati: II  TM distance: >3 FB  Neck ROM: full    Dental     Cardiovascular   Rhythm and rate: regular and normal  (+) murmur       Pulmonary             Lab Results   Component Value Date    WBC 4.5 04/01/2020    HGB 7.3 (L) 04/01/2020    HCT 23.6 (L) 04/01/2020     (L) 04/01/2020    CRP 16.7 (H) 04/01/2019    SED 40 (H) 04/01/2019     04/01/2020    POTASSIUM 3.8 04/01/2020    CHLORIDE 103 04/01/2020    CO2 25 04/01/2020    BUN 35 (H) 04/01/2020    CR 1.69 (H) 04/01/2020    GLC 89 04/01/2020    BENNIE 8.4 (L) 04/01/2020    PHOS 3.6 03/02/2019    MAG 2.0 03/11/2020    ALBUMIN 2.4 (L) 04/01/2020    PROTTOTAL 6.0 (L) 04/01/2020    ALT 11 04/01/2020    AST 25 04/01/2020    ALKPHOS 67 04/01/2020    BILITOTAL 1.4 (H) 04/01/2020    JOVANA 44 12/13/2018    PTT 36 06/11/2019    INR 2.29 (H) 04/01/2020    FIBR 278 12/04/2017    TSH 0.86 10/31/2018       Preop Vitals  BP Readings from Last 3 Encounters:   04/01/20 109/64   03/23/20 112/63   03/11/20  "117/48    Pulse Readings from Last 3 Encounters:   04/01/20 66   03/23/20 67   03/11/20 70      Resp Readings from Last 3 Encounters:   04/01/20 17   03/23/20 16   03/11/20 18    SpO2 Readings from Last 3 Encounters:   04/01/20 98%   03/23/20 96%   03/11/20 98%      Temp Readings from Last 1 Encounters:   04/01/20 36.5  C (97.7  F)    Ht Readings from Last 1 Encounters:   03/09/20 1.88 m (6' 2\")      Wt Readings from Last 1 Encounters:   04/01/20 103 kg (227 lb 1.2 oz)    Estimated body mass index is 29.15 kg/m  as calculated from the following:    Height as of 3/9/20: 1.88 m (6' 2\").    Weight as of this encounter: 103 kg (227 lb 1.2 oz).       Anesthesia Plan      History & Physical Review  History and physical reviewed and following examination; no interval change.    ASA Status:  4 .    NPO Status:  > 8 hours    Plan for General            Postoperative Care  Postoperative pain management:  IV analgesics.      Consents  Anesthetic plan, risks, benefits and alternatives discussed with:  Patient..                 Leandro Mata MD  "

## 2020-04-01 NOTE — ANESTHESIA CARE TRANSFER NOTE
Patient: Antonoi Ramirez    Procedure(s):  ESOPHAGOGASTRODUODENOSCOPY (EGD)  COLONOSCOPY    Diagnosis: GI bleed [K92.2]  Diagnosis Additional Information: No value filed.    Anesthesia Type:   General     Note:  Airway :Face Mask  Patient transferred to:PACU  Comments: Neuromuscular blockade not used after succinylcholine for intubation, spontaneous return of TOF 4/4 with sustained tetany, spontaneous respirations, adequate tidal volumes, followed commands to voice, oropharynx suctioned with soft flexible catheter, extubated atraumatically, airway patent after extubation.  Oxygen via facemask at 8 liters per minute to PACU. Oxygen tubing connected to wall O2 in PACU, SpO2, NiBP, and EKG monitors and alarms on and functioning, report on patient's clinical status given to PACU RN.   Handoff Report: Identifed the Patient, Identified the Reponsible Provider, Reviewed the pertinent medical history, Discussed the surgical course, Reviewed Intra-OP anesthesia mangement and issues during anesthesia, Set expectations for post-procedure period and Allowed opportunity for questions and acknowledgement of understanding      Vitals: (Last set prior to Anesthesia Care Transfer)    CRNA VITALS  4/1/2020 1251 - 4/1/2020 1330      4/1/2020             SpO2:  99 %    Resp Rate (set):  10                Electronically Signed By: DIPAK Goodrich CRNA  April 1, 2020  1:30 PM

## 2020-04-01 NOTE — PROGRESS NOTES
[facility]    Hospitalist Progress Note    Assessment & Plan   Antonio Ramirez is a 75 year old male who was admitted on 3/31/2020.     Past medical history significant for alcohol dependency with ongoing alcohol use, alcohol cirrhosis with scheduled standing paracentesis every 2 weeks, coronary artery disease status post MI and three-vessel CABG, ischemic cardiomyopathy with an EF of 40 to 45% and chronic systolic heart failure, severe pulmonary hypertension with elevated pulmonary pressures, persistent atrial fibrillation on anticoagulation, antiphospholipid syndrome on chronic anticoagulation, history of left below the knee amputation with gangrene with subsequent above-the-knee amputation due to infection of the stump, history of large cell B-cell non-Hodgkin's lymphoma, chronic kidney disease stage III, chronic neuropathy, chronic anemia, chronic thrombocytopenia, history of PEA and possibly VT arrest on 2019, history of PE status post IVC filter placed in , mild to moderate mitral regurgitation with mild mitral stenosis and mild tricuspid regurgitation, history of prostate cancer status post radioactive seed placement currently in remission who was admitted to [facility] due to acute on chronic anemia secondary to rectal bleeding.     Rectal bleedin-10 day history of rectal bleeding.  Patient contacted on-call nurse and was directed to seek medical attention in the emergency department.  Evaluation in the emergency department revealed a hemoglobin of 5.4.  Patient has received 1 unit of packed red blood cells.  Patient was started on octreotide drip and Protonix drip.  Recent Labs   Lab 20  0636 20  0015 20  1810 20  1001   HGB 7.3* 6.4* 7.1* 5.4*   - S/p 3 units of PRBC.    --Continue octreotide drip and Protonix drip.  --Serial hemoglobins every 6 hours.  --Conditional transfusion orders in place.  --[place] [facility] consult  appreciated:   --Discussed with Dr. Freeman 3/31.  Plan to proceed with EGD and colonoscopy 4/1.     --Following EGD/Colonoscopy was contacted by Dr. Freeman and reviewed results.    --Colonoscopy with red blood throughout the colon ddx include AVM or ectopic varix.  Noted 1 large rectal varix was seen and large external hemorrhoid.  Plan to proceed to red blood cell tag and if positive would likely need IR evaluation.      --EGD relatively normal.    --IV fluids at 50 cc an hour with normal saline.  --Hold prior to admit Xarelto and baby aspirin daily.  --IV ceftriaxone 2 gram daily.       Acute on chronic anemia and chronic thrombocyotpenia:   Patient has chronic anemia likely secondary to ongoing alcohol use as well as chronic kidney disease.  Patient's baseline hemoglobin appears to have around 7.  Again upon admit hemoglobin was noted be 5.4.  Patient is received 1 unit of packed red blood cell transfusion in the ED.  Platelet count WNL at 175.    Recent Labs   Lab 04/01/20  0636 04/01/20  0015 03/31/20  1810 03/31/20  1001   HGB 7.3* 6.4* 7.1* 5.4*   - S/p 3 units of PRBC.    - PLT: 175-->129.    --Continue octreotide drip and Protonix drip.  --Serial hemoglobins every 6 hours.  --Conditional transfusion orders in place.  --Hold prior to admit Xarelto and baby aspirin daily.     Alcohol use with known alcohol cirrhosis with persistent ascites and assumed coagulapthy:  Patient has a standing appointment to undergo paracentesis every 2 weeks.  This last occurred a week ago.  Patient continues to consume alcohol maybe 3 or 4 vodka drinks per day.  Patient has no desire to quit drinking at this point.  Laboratory studies performed in the emergency department were unremarkable for elevated liver studies.    INR: 3.92 (vitamin K administered )-->2.29.  --Continue octreotide drip and Protonix drip.  --[place] [facility] consult appreciated:   --EGD and colonoscopy today.  --Patient states he is sporadically utilizing his  spironolactone and torsemide.  We will hold both medications due to active rectal bleeding.  --Vitamin K was administered in the emergency department.  We will plan to recheck INR in the morning.  --Monitor on CIWA; will not order ativan until he begins to score.       CAD with a history of MI status post three-vessel CABG with associated ischemic cardiomyopathy, chronic systolic congestive heart failure, hypertension, hyperlipidemia:  Patient denies any active chest pain.  --Patient indicated that he believes he is on a blood pressure medication but does not know the name.  --Patient states he is sporadically utilizing his spironolactone and torsemide.  We will hold both medications due to active rectal bleeding.  --Patient does not appear to be on any cholesterol lowering agents at this point.  --As patient is on IV fluids we will monitor daily weights and strict I's and O's and follow fluid status closely.     Persistent atrial fibrillation and history of PEA arrest and VT arrest:  Patient stated that she goes in and out of atrial fibrillation.  He is compliant with daily Xarelto.  --Monitor on telemetry.  --Hold prior to admit Xarelto and baby aspirin.     History of PE in the setting of known antiphospholipid antibody syndrome:  --Hold prior to admit Xarelto and baby aspirin.     Chronic kidney disease stage III:  Patient's creatinine is currently noted to be 1.59 with GFR of 41 and BUN of 35.  This appears improved since previous study earlier this month.  Recent Labs   Lab 04/01/20  0636 03/31/20  1001   CR 1.69* 1.59*   --We will start the patient on IV fluids with 50 cc/h with normal saline.  --Monitor renal function closely.     Chronic neuropathy:  --Resume PTA gabapentin.       Valvular disorder:  Mild to moderate mitral regurgitation, mild mitral stenosis, mild tricuspid regurgitation, and aortic stenosis.    --Follows with Cardiology.    --Monitor.       History of prostate CA and large cell B-cell  non-Hodgkin's lymphoma:  S/p radioactive seeding of the prostate.  Appears to be in remission.    --No interventions necessary at this point.         Diet: NPO per Anesthesia Guidelines for Procedure/Surgery Except for: Meds     DVT Prophylaxis: Pneumatic Compression Devices   Levin Catheter: not present  Code Status: Full Code     Disposition Plan    Expected discharge: 2 - 3 days, recommended to prior living arrangement once GI work-up completed, hemoglobin stable.   Entered: Delgado Raphael PA-C 04/01/2020, 6:37 AM          The patient has been discussed with Dr. Shaffer, who agrees with the assessment and plan at this time.  Dr. Shaffer will evaluate the patient independently.      Antonio Raphael PA-C   [phone]    Interval History   Patient was resting in bed upon arrival.  He denied fever, chills, chest pain, shortness of breath or abdominal pain.  He did not complete the full bowel prep but drank most of it.  He stated his last several bowel movements were nothing but blood.      Reviewed hemoglobin results and INR.  Patient wanted to know who was performing the EGD/Colonoscopy and indicated it was likely Dr. Freeman.  He wanted this verified and then asked what time.  Found out that it is scheduled for 12:30.      Patient then asked why and where he is bleeding.  Discussed several possible reasons for bleeding.  He asked about receiving IV Iron.      -Data reviewed today: I reviewed all new labs and imaging results over the last 24 hours. I personally reviewed no images or EKG's today.    Physical Exam   Temp: 97.7  F (36.5  C) Temp src: Oral BP: 108/62 Pulse: 70 Heart Rate: 67 Resp: 15 SpO2: 96 % O2 Device: None (Room air)    Vitals:    03/31/20 1207 04/01/20 0609   Weight: 100.7 kg (222 lb) 103 kg (227 lb 1.2 oz)     Vital Signs with Ranges  Temp:  [97.6  F (36.4  C)-98.3  F (36.8  C)] 97.7  F (36.5  C)  Pulse:  [65-95] 70  Heart Rate:  [63-79] 67  Resp:  [10-25] 15  BP: ()/(52-91)  108/62  SpO2:  [93 %-99 %] 96 %  I/O last 3 completed shifts:  In: 815 [P.O.:200]  Out: 300 [Urine:300]      Constitutional: Awake, alert, cooperative, no apparent distress.    ENT: Normocephalic, without obvious abnormality, atraumatic, oral pharynx with moist mucus membranes, tonsils without erythema or exudates.  Neck: Supple, symmetrical, trachea midline, no adenopathy.  Pulmonary: No increased work of breathing, good air exchange, clear to auscultation bilaterally, no crackles or wheezing.  Cardiovascular: Irrer Irreg (controlled), normal S1 and S2, no S3 or S4, and 3/6 murmur noted.  GI: Normal bowel sounds, soft, non-distended, non-tender.  Skin/Integumen: Clear.  Neuro: CN II-XII grossly intact.  Psych:  Alert and oriented x 3. Normal affect.  Extremities: Left lower extremity prosthesis in place.  No edema or tenderness on the right lower extremity.        Medications     - MEDICATION INSTRUCTIONS -       - MEDICATION INSTRUCTIONS -       octreotide (sandoSTATIN) infusion ADULT 50 mcg/hr (04/01/20 0657)     pantoprazole (PROTONIX) infusion ADULT/PEDS GREATER than or EQUAL to 45 kg 8 mg/hr (03/31/20 2235)     sodium chloride 50 mL/hr at 04/01/20 0656       cefTRIAXone  2 g Intravenous Q24H     folic acid  1 mg Oral Daily     gabapentin  600 mg Oral BID     multivitamin w/minerals  1 tablet Oral Daily     sodium chloride (PF)  3 mL Intracatheter Q8H     vitamin B1  100 mg Oral Daily       Data   Recent Labs   Lab 04/01/20  0636 04/01/20  0015 03/31/20  1810 03/31/20  1001   WBC 4.5  --   --  4.3   HGB 7.3* 6.4* 7.1* 5.4*   MCV 79  --   --  74*   *  --   --  175   INR 2.29*  --   --  3.92*     --   --  139   POTASSIUM 3.8  --   --  3.8   CHLORIDE 103  --   --  106   CO2 25  --   --  25   BUN 35*  --   --  35*   CR 1.69*  --   --  1.59*   ANIONGAP 7  --   --  8   BENNIE 8.4*  --   --  8.7   GLC 89  --   --  103*   ALBUMIN 2.4*  --   --  2.6*   PROTTOTAL 6.0*  --   --  6.6*   BILITOTAL 1.4*  --   --   0.7   ALKPHOS 67  --   --  76   ALT 11  --   --  13   AST 25  --   --  23       No results found for this or any previous visit (from the past 24 hour(s)).

## 2020-04-01 NOTE — PROGRESS NOTES
GI - Remote entry    Tagged cell scan + in the small bowel, official read states duodenum.  I reviewed  the study with Dr. Javier from IR who interpreted the images supporting small lesion located in the Rt abd, likely corresponding to small bowel, but given EGD results today we agreed that duodenum was unlikely source. The yield of angiogram in this setting is thought to be low vs risk of renal failure, from which the patient may not recover.  Therefore, Dr. Javier and I agreed to follow Hgb overnight, replace as needed to keep Hgb ~7 and if hemodynamic bleeding develops angiogram overnight would be the next step  (Dr. Javier is on call for IR).  Meanwhile, octreotide should be continued, pt should stay NPO for possible emergent procedure.  Repeat endoscopy at this time seems low yield, but will follow up tomorrow.    Above discussed with RN who will contact medicine coverage overnight if there is need for further transfusion or angiogram.     Emeka Freeman DO   [facility]  Cell [phone]

## 2020-04-02 LAB
ANION GAP SERPL CALCULATED.3IONS-SCNC: 4 MMOL/L (ref 3–14)
BUN SERPL-MCNC: 32 MG/DL (ref 7–30)
CALCIUM SERPL-MCNC: 8.1 MG/DL (ref 8.5–10.1)
CHLORIDE SERPL-SCNC: 104 MMOL/L (ref 94–109)
CO2 SERPL-SCNC: 27 MMOL/L (ref 20–32)
CREAT SERPL-MCNC: 1.74 MG/DL (ref 0.66–1.25)
ERYTHROCYTE [DISTWIDTH] IN BLOOD BY AUTOMATED COUNT: 19.6 % (ref 10–15)
GFR SERPL CREATININE-BSD FRML MDRD: 37 ML/MIN/{1.73_M2}
GLUCOSE SERPL-MCNC: 107 MG/DL (ref 70–99)
HCT VFR BLD AUTO: 26.5 % (ref 40–53)
HGB BLD-MCNC: 7.1 G/DL (ref 13.3–17.7)
HGB BLD-MCNC: 7.2 G/DL (ref 13.3–17.7)
HGB BLD-MCNC: 7.2 G/DL (ref 13.3–17.7)
HGB BLD-MCNC: 8.1 G/DL (ref 13.3–17.7)
MCH RBC QN AUTO: 24.6 PG (ref 26.5–33)
MCHC RBC AUTO-ENTMCNC: 30.6 G/DL (ref 31.5–36.5)
MCV RBC AUTO: 81 FL (ref 78–100)
PLATELET # BLD AUTO: 123 10E9/L (ref 150–450)
POTASSIUM SERPL-SCNC: 3.4 MMOL/L (ref 3.4–5.3)
RBC # BLD AUTO: 3.29 10E12/L (ref 4.4–5.9)
SODIUM SERPL-SCNC: 135 MMOL/L (ref 133–144)
WBC # BLD AUTO: 4.9 10E9/L (ref 4–11)

## 2020-04-02 PROCEDURE — 99233 SBSQ HOSP IP/OBS HIGH 50: CPT | Performed by: PHYSICIAN ASSISTANT

## 2020-04-02 PROCEDURE — 80048 BASIC METABOLIC PNL TOTAL CA: CPT | Performed by: PHYSICIAN ASSISTANT

## 2020-04-02 PROCEDURE — 25000132 ZZH RX MED GY IP 250 OP 250 PS 637: Mod: GY | Performed by: PHYSICIAN ASSISTANT

## 2020-04-02 PROCEDURE — 36415 COLL VENOUS BLD VENIPUNCTURE: CPT | Performed by: PHYSICIAN ASSISTANT

## 2020-04-02 PROCEDURE — 25000128 H RX IP 250 OP 636: Performed by: PHYSICIAN ASSISTANT

## 2020-04-02 PROCEDURE — 12000047 ZZH R&B IMCU

## 2020-04-02 PROCEDURE — 99207 ZZC CDG-MDM COMPONENT: MEETS MODERATE - UP CODED: CPT | Performed by: PHYSICIAN ASSISTANT

## 2020-04-02 PROCEDURE — 85027 COMPLETE CBC AUTOMATED: CPT | Performed by: PHYSICIAN ASSISTANT

## 2020-04-02 PROCEDURE — C9113 INJ PANTOPRAZOLE SODIUM, VIA: HCPCS | Performed by: PHYSICIAN ASSISTANT

## 2020-04-02 PROCEDURE — 85018 HEMOGLOBIN: CPT | Performed by: PHYSICIAN ASSISTANT

## 2020-04-02 PROCEDURE — 25000132 ZZH RX MED GY IP 250 OP 250 PS 637: Mod: GY | Performed by: INTERNAL MEDICINE

## 2020-04-02 PROCEDURE — 25800030 ZZH RX IP 258 OP 636: Performed by: PHYSICIAN ASSISTANT

## 2020-04-02 PROCEDURE — 25000128 H RX IP 250 OP 636: Performed by: INTERNAL MEDICINE

## 2020-04-02 RX ADMIN — RIVAROXABAN 20 MG: 20 TABLET, FILM COATED ORAL at 17:04

## 2020-04-02 RX ADMIN — CEFTRIAXONE 2 G: 2 INJECTION, POWDER, FOR SOLUTION INTRAMUSCULAR; INTRAVENOUS at 14:22

## 2020-04-02 RX ADMIN — SODIUM CHLORIDE 8 MG/HR: 9 INJECTION, SOLUTION INTRAVENOUS at 08:50

## 2020-04-02 RX ADMIN — GABAPENTIN 600 MG: 600 TABLET, FILM COATED ORAL at 07:47

## 2020-04-02 RX ADMIN — GABAPENTIN 600 MG: 600 TABLET, FILM COATED ORAL at 22:04

## 2020-04-02 RX ADMIN — SODIUM CHLORIDE: 9 INJECTION, SOLUTION INTRAVENOUS at 11:46

## 2020-04-02 ASSESSMENT — ACTIVITIES OF DAILY LIVING (ADL)
ADLS_ACUITY_SCORE: 16

## 2020-04-02 NOTE — PROGRESS NOTES
[facility]    Medicine History and Physical - Hospitalist Service       Date of Admission:  3/31/2020    Assessment & Plan   Antonio Ramirez is a 75 year old male admitted on 3/31/2020 with complicated medical hx as below on chronic anticoagulation who was admitted with acute on chronic anemia due to rectal bleeding.     1. Rectal bleeding.  2. Acute on chronic anemia and chronic thrombocytopenia. Chronic component related to ongoing alcohol use, cirrhosis, and CKD. Baseline hgb around 7.  7-10 day history of rectal bleeding. Hgb 5.4 on adm. EGD did not show source of bleeding. Colonoscopy with red blood in terminal ileum throughout entire examined colon, one 10 mm polyp in the ascending colon (not removed), sigmoid diverticulosis, a non-bleeding rectal varix, and non-bleeding external hemorrhoids. Tagged RBC scan suggested bleed in duodenum which was felt inaccurate as EGD showed normal duodenum.  --Appreciate [facility] consult - discussed with Dr. Freeman 4/2, resume Xarelto, discontinue octreotide, and plan for PillCam study 4/3. Hoping to leave 4/3 as best case scenario, discussed anticipate 4/4 more likely.  --Continue Protonix drip.  --Serial hemoglobins every 6 hours and conditional transfusion orders in place.   --S/p 5 units of PRBC.  --Change fluids to TKO as allowed clear liquids.  --Cont hold ASA 81mg daily.  --IV ceftriaxone 2 gram daily.       1. Alcohol cirrhosis with persistent ascites, portal hypertensive gastropathy, and assumed coagulapathy.  2. Alcohol dependence.  Has standing appointment for paracentesis every 2 weeks (last 3/23) since 2016. Consume 3-4 vodka drinks per day.  No desire to quit drinking at this point. INR 3.92 on adm and reversed with vitamin K.  --Appreciate [facility] consult as above.  --Continue to hold PTA spironolactone and torsemide with soft BP in setting rectal bleeding. Indicated sporatic use PTA.  --Monitor on CIWA; will not order ativan until he begins to  score.       1. CAD s/p 3v-CABG.  2. Ischemic cardiomyopathy with HFrEF.  3. Severe pulmonary hypertension.  4. Valvular disorder. Mild to moderate mitral regurgitation, mild mitral stenosis, mild tricuspid regurgitation, and aortic stenosis.  5. HTN, DLD.  6. Hx PEA arrest and VT arrest 6/2019.  Noncompliant in review of most recent cardiology note.  --Tele, I&Os, and daily weights.  --PTA ASA and diuretics on hold as above.     Persistent atrial fibrillation.  Patient stated that she goes in and out of atrial fibrillation.  He is compliant with daily Xarelto.  --Resume Xarelto as above.  --Hold aspirin as above.     Antiphospholipid antibody syndrome with hx PE s/p IVC filter (2007).  --Resume Xarelto as above.  --Hold aspirin as above.     CKD, stage III. Baseline Cr 1.5-1.9.     Chronic neuropathy. Gabapentin.      Left BKA.     Hx prostate CA and large cell B-cell non-Hodgkin's lymphoma:  S/p radioactive seeding of the prostate.  Appears to be in remission.    --No interventions necessary at this point.      Diet: Clear Liquid Diet  Fluids: TKO  Levin Catheter: not present    DVT Prophylaxis: Pneumatic Compression Devices  Code Status: Full Code confirmed with patient 4/2.    Expected discharge: 2 - 3 days, recommended to prior living vs tcu once bleeding stopped, hemodynamic stability, and GI evaluation completed.  The patient's care was discussed with the Attending Physician, Dr. Taylor, Bedside Nurse and Patient.    JoAnna K. Barthell, PA-C  Hospitalist Service  [facility]    ______________________________________________________________________    Interval History   No bleeding over night and hgb stable. BP soft upper 90-low 100s. Not OOB. No pain. PillCam study tmrw and plans to resume Xarelto and stop Octreotide after lengthy discussion with [facility].    Data reviewed today: I reviewed all medications, new labs and imaging results over the last 24 hours. I personally reviewed no images or  EKG's today.    Physical Exam   Vital Signs: Temp: 98  F (36.7  C) Temp src: Oral BP: 105/58 Pulse: 70 Heart Rate: 72 Resp: 20 SpO2: 94 % O2 Device: None (Room air) Oxygen Delivery: 2 LPM  Weight: 227 lbs 1.18 oz  Constitutional: Appears stated age, no acute distress.  Respiratory: Breath sounds CTA. No increased work of breathing.  Cardiovascular: RRR, no rub or murmur. No peripheral edema.  GI: Soft, non-tender, non-distended.  Skin: Warm, dry. Pale.    Medications     octreotide (sandoSTATIN) infusion ADULT 50 mcg/hr (04/01/20 1952)     pantoprazole (PROTONIX) infusion ADULT/PEDS GREATER than or EQUAL to 45 kg 8 mg/hr (04/02/20 0850)     sodium chloride 50 mL/hr at 04/02/20 1146       cefTRIAXone  2 g Intravenous Q24H     folic acid  1 mg Oral Daily     gabapentin  600 mg Oral BID     multivitamin w/minerals  1 tablet Oral Daily     sodium chloride (PF)  3 mL Intracatheter Q8H     vitamin B1  100 mg Oral Daily       Data   Recent Labs   Lab 04/02/20  1013 04/02/20  0409 04/01/20  2150  04/01/20  0636  03/31/20  1001   WBC  --  4.9  --   --  4.5  --  4.3   HGB 7.2* 8.1* 7.8*   < > 7.3*   < > 5.4*   MCV  --  81  --   --  79  --  74*   PLT  --  123*  --   --  129*  --  175   INR  --   --   --   --  2.29*  --  3.92*   NA  --  135  --   --  135  --  139   POTASSIUM  --  3.4  --   --  3.8  --  3.8   CHLORIDE  --  104  --   --  103  --  106   CO2  --  27  --   --  25  --  25   BUN  --  32*  --   --  35*  --  35*   CR  --  1.74*  --   --  1.69*  --  1.59*   ANIONGAP  --  4  --   --  7  --  8   BENNIE  --  8.1*  --   --  8.4*  --  8.7   GLC  --  107*  --   --  89  --  103*   ALBUMIN  --   --   --   --  2.4*  --  2.6*   PROTTOTAL  --   --   --   --  6.0*  --  6.6*   BILITOTAL  --   --   --   --  1.4*  --  0.7   ALKPHOS  --   --   --   --  67  --  76   ALT  --   --   --   --  11  --  13   AST  --   --   --   --  25  --  23    < > = values in this interval not displayed.       Imaging:  Recent Results (from the past 24 hour(s))    [facility] Bleed    Narrative    NUCLEAR MEDICINE GASTROINTESTINAL BLEEDING STUDY 4/1/2020 3:58 PM    HISTORY: Gastrointestinal bleed. Cirrhotic patient, appears to have  small bowel source of acute bleed on EGD/ileocolonoscopy today.  Question AVM versus ectopic varix.    COMPARISON: None.    TECHNIQUE: Tc labeled red blood cells were injected intravenously with  subsequent dynamic imaging of the abdomen for evaluation of GI bleed.     DOSE: 27mCi Tc99m Ultratag RBC's See study notes. injected  intravenously.    FINDINGS: There is abnormal localization of tracer in the upper  abdomen to the right of midline, possibly within the duodenal C-loop.      Impression    IMPRESSION: Positive tagged red blood cell study with probable  localization to the duodenum. Interventional Radiology is aware of  these findings.    LESTER BROWN MD

## 2020-04-02 NOTE — PROGRESS NOTES
GI     Greater than 60 mins spent today reviewing testing, DDx, risks/causes of bleeding (xarelto, liver disease) and options for further diagnostics with associated risks/benefits.      Overnight, appears there has been no further bleeding.  The patient was under the impression this would correlate with prompt resolution of diet and discharge.  We reviewed the findings of yesterday's tests and he was amenable to the plan below.  He specifically expressed understanding of risks of additional bleeding, hypotension, shock, death.  He also expressed understanding of the DDx and lack of clear therapeutic plan given the lack of specific diagnosis.  If the bleeding is from an ectopic small bowel varix, there may or may not be a good option for therapy with IR.  Additionally, there may be little we can offer to address small bowel AVMs or neoplasm, but further clarification of this DDx is recommended.     Abd is soft, ND, +BS, NTTP.     Imp:  Overt, obscure GIB in a patient with decompensated cirrhosis, on xarelto for APLS/DVT/PE/A fib, and CKD stage III    Recs:  Restart xarelto  Stop octreotide  Clears only, NPO at [facility]  Pillcam tomorrow - pt expresses understanding that these results will not likely be back until Monday/Tuesday  Pending clinical course, ie recurrent bleeding, pt in favor of restarting diet and leaving the hospital ASAP.    Discussed with medicine PA present at the bedside.     Emeka Freeman DO   [facility]  Cell [phone]

## 2020-04-02 NOTE — PLAN OF CARE
AVSS on RA. Denies pain. LS clear and equal. Diet NPO at midnight pending possible further intervention for GI bleed with GI. Up with assist of 1 walker and GB. Left prosthesis left in place per pt. BS active and audible voiding to urinal. Spot checking o2 per patient request.

## 2020-04-02 NOTE — PROGRESS NOTES
[facility]  [facility]  Gastroenterology Progress note    Interval History:    Hemoglobin stable at 8.1.  No further signs of bleeding.    Vital Signs:      /59   Pulse 71   Temp 98.1  F (36.7  C) (Oral)   Resp 19   Wt 103 kg (227 lb 1.2 oz)   SpO2 94%   BMI 29.15 kg/m    Temp (24hrs), Av  F (36.7  C), Min:97.3  F (36.3  C), Max:98.7  F (37.1  C)    Patient Vitals for the past 72 hrs:   Weight   20 0609 103 kg (227 lb 1.2 oz)   20 1207 100.7 kg (222 lb)       Intake/Output Summary (Last 24 hours) at 2020 0839  Last data filed at 2020 0547  Gross per 24 hour   Intake 1479.17 ml   Output 450 ml   Net 1029.17 ml         Constitutional: NAD, comfortable  Cardiovascular: RRR, normal S1, S2   Respiratory: CTAB  Abdomen: soft, non-tender, nondistended    Additional Comments:  ROS, FH, SH: See initial GI consult for details.    Laboratory Data:  Recent Labs   Lab Test 20  04020  2150 20  1631 20  0620  1001 20  1306   WBC 4.9  --   --  4.5  --  4.3  --    HGB 8.1* 7.8* 6.9* 7.3*   < > 5.4*  --    MCV 81  --   --  79  --  74*  --    *  --   --  129*  --  175  --    INR  --   --   --  2.29*  --  3.92* 1.42*    < > = values in this interval not displayed.     Recent Labs   Lab Test 20  04020  0636 20  1001    135 139   POTASSIUM 3.4 3.8 3.8   CHLORIDE 104 103 106   CO2 27 25 25   BUN 32* 35* 35*   CR 1.74* 1.69* 1.59*   ANIONGAP 4 7 8   BENNIE 8.1* 8.4* 8.7     Recent Labs   Lab Test 20  0620  1001 19  1045  19  0220  19  1320 19  1635 19  1625  18  0714  18  0737   ALBUMIN 2.4* 2.6* 1.9*   < >  --    < >  --   --  2.7*   < > 3.0*   < > 2.8*   BILITOTAL 1.4* 0.7 1.0   < >  --    < >  --   --  2.9*   < > 0.8   < > 1.5*   DBIL  --   --   --   --   --   --   --   --  1.4*  --  0.5*  --  0.7*   ALT 11 13 10   < >  --    < >  --    --  11   < > 10   < > 16   AST 25 23 27   < >  --    < >  --   --  24   < > 25   < > 41   ALKPHOS 67 76 93   < >  --    < >  --   --  90   < > 65   < > 89   PROTEIN  --   --   --   --  10*  --  10* 10*  --    < >  --    < >  --     < > = values in this interval not displayed.         Assessment:  74 yo male who presented with blood loss and microcytic anemia.  EGD/colonoscopy performed yesterday.  EGD showed one benign-appearing, intrinsic mild stenosis, moderate portal hypertensive gastropathy in the stomach, the examined duodenum was normal.  Colonoscopy revealed red blood in the terminal ileum and throughout the entire examined colon, one 10 mm polyp in the ascending colon (not removed), sigmoid diverticulosis, a non-bleeding rectal varix and non-bleeding external hemorrhoids.  Differential diagnosis for bleeding includes small bowel AVMs, neoplasm, or ectopic varix.       Tagged RBC scan completed for localization and was positive with probable localization to the duodenum.  Dr. Freeman discussed case with Dr. Javier from IR yesterday who felt images supported small lesion in the right abdomen, likely the small bowel, but given EGD results duodenum was felt unlikely to be the source.    Plan to follow hemoglobin which remains stable at 8.1.  An angiogram would be recommended if patient developed significant bleeding.    Plan:  -  Monitor H/H; transfuse prn.  -  Angiogram if develops signs of recurrent bleeding/hemodynamic instability.  -  Continue octreotide.  -  Will start clear liquid diet at patient's request.      NOREEN Masters  [facility]  Office:  [phone] call if needed after 5PM  Cell:  [phone], not available after 5PM at this number

## 2020-04-02 NOTE — PLAN OF CARE
Pt A&OX4, VSS, denies pain.  Pt expressed much frustration with last nights events and lack of information patient/family received.  Much time spent in room with patient listening to concerns. Pt in much better spirits after doctors rounded and thoroughly explained findings.  No BMs today.  Voiding in urinal.  Tolerating clear liquid diet.  Buttox intact, pin, but blanchable, on pulsate mattress. Pt self repositioning.  Refusing to wear continuous pulse oximeter, refusing IS and SCDs. Refusing to sit up in chair or ambulate.  States comfortable in bed.

## 2020-04-03 ENCOUNTER — APPOINTMENT (OUTPATIENT)
Dept: PHYSICAL THERAPY | Facility: CLINIC | Age: 75
DRG: 299 | End: 2020-04-03
Attending: PHYSICIAN ASSISTANT
Payer: MEDICARE

## 2020-04-03 LAB
ABO + RH BLD: NORMAL
ABO + RH BLD: NORMAL
BLD GP AB SCN SERPL QL: NORMAL
BLD PROD TYP BPU: NORMAL
BLD PROD TYP BPU: NORMAL
BLD UNIT ID BPU: 0
BLOOD BANK CMNT PATIENT-IMP: NORMAL
BLOOD PRODUCT CODE: NORMAL
BPU ID: NORMAL
HGB BLD-MCNC: 6.8 G/DL (ref 13.3–17.7)
HGB BLD-MCNC: 7.4 G/DL (ref 13.3–17.7)
HGB BLD-MCNC: 7.5 G/DL (ref 13.3–17.7)
HGB BLD-MCNC: 7.6 G/DL (ref 13.3–17.7)
NUM BPU REQUESTED: 5
SPECIMEN EXP DATE BLD: NORMAL
TRANSFUSION STATUS PATIENT QL: NORMAL
TRANSFUSION STATUS PATIENT QL: NORMAL

## 2020-04-03 PROCEDURE — 25000132 ZZH RX MED GY IP 250 OP 250 PS 637: Mod: GY | Performed by: PHYSICIAN ASSISTANT

## 2020-04-03 PROCEDURE — 25000128 H RX IP 250 OP 636: Performed by: INTERNAL MEDICINE

## 2020-04-03 PROCEDURE — 12000047 ZZH R&B IMCU

## 2020-04-03 PROCEDURE — 36415 COLL VENOUS BLD VENIPUNCTURE: CPT | Performed by: PHYSICIAN ASSISTANT

## 2020-04-03 PROCEDURE — 25000132 ZZH RX MED GY IP 250 OP 250 PS 637: Mod: GY

## 2020-04-03 PROCEDURE — 97530 THERAPEUTIC ACTIVITIES: CPT | Mod: GP | Performed by: PHYSICAL THERAPIST

## 2020-04-03 PROCEDURE — 99233 SBSQ HOSP IP/OBS HIGH 50: CPT | Performed by: INTERNAL MEDICINE

## 2020-04-03 PROCEDURE — 97162 PT EVAL MOD COMPLEX 30 MIN: CPT | Mod: GP | Performed by: PHYSICAL THERAPIST

## 2020-04-03 PROCEDURE — P9016 RBC LEUKOCYTES REDUCED: HCPCS | Performed by: EMERGENCY MEDICINE

## 2020-04-03 PROCEDURE — 97116 GAIT TRAINING THERAPY: CPT | Mod: GP | Performed by: PHYSICAL THERAPIST

## 2020-04-03 PROCEDURE — 85018 HEMOGLOBIN: CPT | Performed by: PHYSICIAN ASSISTANT

## 2020-04-03 RX ADMIN — RIVAROXABAN 15 MG: 15 TABLET, FILM COATED ORAL at 17:23

## 2020-04-03 RX ADMIN — GABAPENTIN 600 MG: 600 TABLET, FILM COATED ORAL at 19:52

## 2020-04-03 RX ADMIN — CEFTRIAXONE 2 G: 2 INJECTION, POWDER, FOR SOLUTION INTRAMUSCULAR; INTRAVENOUS at 14:50

## 2020-04-03 ASSESSMENT — ACTIVITIES OF DAILY LIVING (ADL)
ADLS_ACUITY_SCORE: 17
ADLS_ACUITY_SCORE: 17
ADLS_ACUITY_SCORE: 16
ADLS_ACUITY_SCORE: 17
ADLS_ACUITY_SCORE: 16
ADLS_ACUITY_SCORE: 16

## 2020-04-03 NOTE — PLAN OF CARE
"Discharge Planner PT   Patient plan for discharge: Hesitant to answer but indicated home.   Current status: Patient seen for initial eval and treatment initiated. Patient lives in an apartment with his wife. He reports he is a \"couch potato\" and only walks about 25 feet at a time in the house. Reports he uses a w/c outside the house for long distances but can't use the w/c inside the house due to limited space. Patient has a history of falls and has a left prostiesis. Currently he needs help to don the prosthesis and to don both of his shoes. He is able to transfer sup to/from sit with supervision and sit to stand with close CGA. Tolerated amb about 40 feet with ww and close CGA. UE's very shaky at times. Patient not receptive to staying up in the chair. Tends to downplay his deficits and rolls his eyes at therapist when she makes suggestions for safety.   Barriers to return to prior living situation: Currently very limited activity tolerance, increased fall risk.   Recommendations for discharge: Home with HHPT  Rationale for recommendations: Difficult to tell exactly what patient baseline is as he appears to downplay his deficits. He does report he is a couch potato and he does have a w/c for outside at home so it sounds like his typical activity is very limited. He would benefit from HHPT to progress his activity tolerance and to progress his independence and especially safety awareness at home to decrease his risk of falls. He uses a ww and it would be a taxing effort for him to go out of the home.        Entered by: Maria Luisa Sharma 04/03/2020 11:39 AM      "

## 2020-04-03 NOTE — PROGRESS NOTES
[facility]  Hospitalist Progress Note  Solomon Tucker MD  04/03/2020    Assessment & Plan   Antonio Ramirez is a 75 year old male admitted on 3/31/2020 with complicated medical hx as below on chronic anticoagulation who was admitted with acute on chronic anemia due to rectal bleeding.     1. Alcohol cirrhosis with persistent ascites, portal hypertensive gastropathy, and assumed coagulapathy.  Alcohol dependence.  Severe acute GI bleed  Has standing appointment for paracentesis every 2 weeks (last 3/23) since 2016. Consume 3-4 vodka drinks per day.  No desire to quit drinking at this point. INR 3.92 on adm and reversed with vitamin K.  -- continue to hold PTA spironolactone and torsemide with soft BP in setting rectal bleeding. Indicated sporatic use PTA.  -- discontinue CIWA   -- unclear etiology  -- appreciate GI consultation  -- had pill cam evaluation this AM    2. Acute on chronic anemia and chronic thrombocytopenia.   -- Chronic component related to ongoing alcohol use, cirrhosis, and CKD. Baseline hgb around 7.  -- 7 to 10 day history of rectal bleeding. Hgb 5.4 on adm with 5 units of PRBC transfusion.  -- EGD did not show source of bleeding. Colonoscopy with red blood in terminal ileum throughout entire examined colon, one 10 mm polyp in the ascending colon (not removed), sigmoid diverticulosis, a non-bleeding rectal varix, and non-bleeding external hemorrhoids. Tagged RBC scan suggested bleed in duodenum which was felt inaccurate as EGD showed normal duodenum.  -- [facility] following, off octreotide, resumed xarelto and had PillCam study today.   -- change to po protonix  -- Serial hemoglobins stable  --Cont hold ASA 81mg daily.  --IV ceftriaxone 2 gram daily.       3. CAD s/p 3v-CABG.  Ischemic cardiomyopathy with HFrEF.  Severe pulmonary hypertension.  Valvular disorder. Mild to moderate mitral regurgitation, mild mitral stenosis, mild tricuspid regurgitation, and aortic stenosis.  --  denies any chest pain or sob  -- holding asa    4. HTN, DLD.  -- not on any bp medications  -- just diuretics    5. Hx PEA arrest and VT arrest 6/2019.  Noncompliant in review of most recent cardiology note.  --Tele, I&Os, and daily weights.  --PTA ASA and diuretics on hold as above.     6. Persistent atrial fibrillation.  Patient stated that she goes in and out of atrial fibrillation.  He is compliant with daily Xarelto.  --Resume Xarelto as above.  --Hold aspirin as above.     7. Antiphospholipid antibody syndrome with hx PE s/p IVC filter (2007).  --Resume Xarelto as above.  --Hold aspirin as above.    8. CKD, stage III. Baseline Cr 1.5-1.9.     9. Chronic neuropathy. Gabapentin.       10. Left BKA.     11. Hx prostate CA and large cell B-cell non-Hodgkin's lymphoma:  S/p radioactive seeding of the prostate.  Appears to be in remission.    --No interventions necessary at this point.       Diet: 2 gram Na diet  Levin Catheter: not present  DVT Prophylaxis: Pneumatic Compression Devices  Code Status: Full Code confirmed with patient 4/2.     Expected discharge: 1-2 days, likely to home with Home PT       Interval History   -- chart reviewed  -- discussed with care coordinator  -- therapies recommending home PT    -Data reviewed today: I reviewed all new labs and imaging over the last 24 hours. I personally reviewed no images or EKG's today.    Physical Exam   Heart Rate: 68, Blood pressure 102/55, pulse 63, temperature 98  F (36.7  C), resp. rate (!) 62, weight 103 kg (227 lb 1.2 oz), SpO2 (!) 83 %.  Vitals:    03/31/20 1207 04/01/20 0609   Weight: 100.7 kg (222 lb) 103 kg (227 lb 1.2 oz)     Vital Signs with Ranges  Temp:  [97.8  F (36.6  C)-98.4  F (36.9  C)] 98  F (36.7  C)  Pulse:  [63-79] 63  Heart Rate:  [59-80] 68  Resp:  [11-62] 62  BP: ()/(53-65) 102/55  SpO2:  [83 %-91 %] 83 %  I/O's Last 24 hours  I/O last 3 completed shifts:  In: 600 [P.O.:600]  Out: 1000 [Urine:1000]    Constitutional: Awake,  alert, cooperative, no apparent distress  Respiratory: Clear to auscultation bilaterally, no crackles or wheezing  Cardiovascular: Regular rate and rhythm, normal S1 and S2, + murmur noted  GI: Normal bowel sounds, soft, non-distended, non-tender  Skin/Integumen: No rashes, no cyanosis, + edema, L BKA, brusing  Other:      Medications   All medications were reviewed.    sodium chloride 10 mL/hr at 04/02/20 1415       cefTRIAXone  2 g Intravenous Q24H     folic acid  1 mg Oral Daily     gabapentin  600 mg Oral BID     multivitamin w/minerals  1 tablet Oral Daily     rivaroxaban ANTICOAGULANT  15 mg Oral Daily with supper     sodium chloride (PF)  3 mL Intracatheter Q8H     vitamin B1  100 mg Oral Daily        Data   Recent Labs   Lab 04/03/20  1024 04/03/20  0447 04/02/20  2232  04/02/20  0409  04/01/20  0636  03/31/20  1001   WBC  --   --   --   --  4.9  --  4.5  --  4.3   HGB 7.6* 6.8* 7.1*   < > 8.1*   < > 7.3*   < > 5.4*   MCV  --   --   --   --  81  --  79  --  74*   PLT  --   --   --   --  123*  --  129*  --  175   INR  --   --   --   --   --   --  2.29*  --  3.92*   NA  --   --   --   --  135  --  135  --  139   POTASSIUM  --   --   --   --  3.4  --  3.8  --  3.8   CHLORIDE  --   --   --   --  104  --  103  --  106   CO2  --   --   --   --  27  --  25  --  25   BUN  --   --   --   --  32*  --  35*  --  35*   CR  --   --   --   --  1.74*  --  1.69*  --  1.59*   ANIONGAP  --   --   --   --  4  --  7  --  8   BENNIE  --   --   --   --  8.1*  --  8.4*  --  8.7   GLC  --   --   --   --  107*  --  89  --  103*   ALBUMIN  --   --   --   --   --   --  2.4*  --  2.6*   PROTTOTAL  --   --   --   --   --   --  6.0*  --  6.6*   BILITOTAL  --   --   --   --   --   --  1.4*  --  0.7   ALKPHOS  --   --   --   --   --   --  67  --  76   ALT  --   --   --   --   --   --  11  --  13   AST  --   --   --   --   --   --  25  --  23    < > = values in this interval not displayed.       No results found for this or any previous visit  (from the past 24 hour(s)).    Solomon Tucker MD  Text Page  (7am to 6pm)

## 2020-04-03 NOTE — PLAN OF CARE
"AVSS on RA ex occasional drop to high 80s, but patient refusing o2. Scab on forehead patient reports is from \"scratching too much\".  LS clear and equal. Tele afib with CVR and murmur. Diet NPO pending pill cam. Up with assist of 1. BS active and audible; passing gas. Voiding to urinal.     "

## 2020-04-03 NOTE — DISCHARGE INSTRUCTIONS
"If an appointment was recommended for you please be aware:    DUE TO COVID-19 PANDEMIC, MANY CLINICS ARE TEMPORARILY NOT TAKING IN-PERSON APPOINTMENTS. The clinic may schedule you for a phone visit--please answer your phone. If you develop any symptoms or have any followup questions please call your primary care clinic. If it is an emergency, please dial 911.      It is very important to check in with your provider--during a phone or clinic visit, talk about your hospital stay.  Tell the clinic provider how you feel.  Talk about the medications listed on the discharge papers.  Your doctor will update records, make sure you are still doing OK, and decide if any tests or medication changes are needed.      Please call [facility] (\"[facility]\") for appointment: [phone]   More info online at: [email or web address]  Clinics are located in: [facility], and [place]    NOTE:   If your doctor has ordered home care to help you after your hospital stay.  The staff will contact you to schedule your first visit.  This service will be provided by [facility].  If you have any question, or have not received a call within 48 hours of discharge, please call them at [phone] or [phone].  *please see homecare quality ratings for all homecares in your area at www.medicare.gov     "

## 2020-04-03 NOTE — PROGRESS NOTES
04/03/20 1001   Quick Adds   Type of Visit Initial PT Evaluation   Living Environment   Lives With spouse   Living Arrangements apartment   Home Accessibility no concerns   Transportation Anticipated family or friend will provide  (Wife does all the driving. )   Living Environment Comment Patient is retired.    Self-Care   Usual Activity Tolerance moderate   Equipment Currently Used at Home walker, rolling;wheelchair, manual;shower chair   Activity/Exercise/Self-Care Comment Reports he is a couch potato.    Functional Level Prior   Ambulation 1-->assistive equipment   Transferring 0-->independent   Toileting 1-->assistive equipment  (Grab bars. )   Bathing 1-->assistive equipment  (Grab bars in shower. )   Communication 0-->understands/communicates without difficulty   Swallowing 0-->swallows foods/liquids without difficulty   Cognition 0 - no cognition issues reported   Fall history within last six months yes   Number of times patient has fallen within last six months 2   Prior Functional Level Comment Patient uses a w/c for longer distances outside.    General Information   Onset of Illness/Injury or Date of Surgery - Date 03/31/20   Patient/Family Goals Statement Hesitant when asked what his plan was. Reports he has a paracentesis scheduled for Monday here at [facility].    General Observations Patient currently has a pill cam monitor on.    General Info Comments Has left prosthesis.    Cognitive Status Examination   Orientation orientation to person, place and time   Level of Consciousness alert   Follows Commands and Answers Questions 100% of the time   Personal Safety and Judgment intact   Memory intact   Pain Assessment   Patient Currently in Pain No   Integumentary/Edema   Integumentary/Edema Comments Multiple bruises on bilateral arms. Lateral aspect of bilateral thigh edematous and firm. Right lower leg with 1+ edema. Left with prosthesis and able to don it without difficulty.    Posture    Posture  Forward head position   Range of Motion (ROM)   ROM Comment BKA prosthesis on left. Otherwise range WFL   Strength   Strength Comments Able to lift bilateral UE's against gravity and strength functional for use of ww but UE's shaky during amb. Able to do SLR with right LE and bilateral LE strength functional for amb with ww. Patient with generalized weakness due to baseline lack of activity and then lack of activity since he has been hospitalized.    Bed Mobility   Bed Mobility Comments Sup to/from sit with supervision with HOB flat. Patient wanted the head up but educated on purpose of making environment as similar to home as possible.    Transfer Skills   Transfer Comments Sit to stand with close CGA. Patient struggles to get to standing but eventually able to get to upright position if bed elevated.    Gait   Gait Comments Gait 5 feet in room with ww with CGA. UE tremulous but strength functional for use of walker.    Balance   Balance Comments Good sitting and static standing balance. Fair dynamic standing balnace with support of walker due to tremulous UE's.    General Therapy Interventions   Planned Therapy Interventions bed mobility training;gait training;transfer training   Clinical Impression   Criteria for Skilled Therapeutic Intervention yes, treatment indicated   PT Diagnosis Impaired functional independence.    Influenced by the following impairments Generalized weakness, lack of motivation, UE's tremulous during amb. Edema bilateral LE's especially thighs.    Functional limitations due to impairments Needs extra time for bed mobility and sit to stand, CGA and extra time for amb.    Clinical Presentation Evolving/Changing   Clinical Presentation Rationale 3+ comorbidities affecting mobility.    Clinical Decision Making (Complexity) Moderate complexity   Therapy Frequency Daily   Predicted Duration of Therapy Intervention (days/wks) 4 days   Anticipated Equipment Needs at Discharge   (He has the needed  "equipment already. )   Anticipated Discharge Disposition Home with Home Therapy   Risk & Benefits of therapy have been explained Yes   Patient, Family & other staff in agreement with plan of care Yes   [facility] TM \"6 Clicks\"   2016, Trustees of [facility], under license to [employer].  All rights reserved.   6 Clicks Short Forms Basic Mobility Inpatient Short Form   [facility] AM-PAC  \"6 Clicks\" V.2 Basic Mobility Inpatient Short Form   1. Turning from your back to your side while in a flat bed without using bedrails? 4 - None   2. Moving from lying on your back to sitting on the side of a flat bed without using bedrails? 4 - None   3. Moving to and from a bed to a chair (including a wheelchair)? 3 - A Little   4. Standing up from a chair using your arms (e.g., wheelchair, or bedside chair)? 3 - A Little   5. To walk in hospital room? 3 - A Little   6. Climbing 3-5 steps with a railing? 2 - A Lot   Basic Mobility Raw Score (Score out of 24.Lower scores equate to lower levels of function) 19   Total Evaluation Time   Total Evaluation Time (Minutes) 14     "

## 2020-04-03 NOTE — PLAN OF CARE
IMC Status. A&O, VSS on room air. Tele: A-fib CVR. Lung sounds clear. Bowel sounds active,+large bloody loose stool. Voiding adequately. Ambulates assist x1. Tolerating 2g Na diet. Pill camera in process, okay to remove when blue blinking light is complete or at 21:50. 1 unit of RBC given, Hgb recheck came back 7.5

## 2020-04-03 NOTE — CONSULTS
Care Transition Initial Assessment - RN  Consulted for: home health care   Met with: Patient.    DATA   Active Problems:    Rectal bleeding       Cognitive Status: awake, alert and oriented.    Contact information and PCP information verified: Yes  + PCP is Dr. Hardy at [facility] ([phone])  + Pt's home / mobile phone ([phone])    Insurance concerns: No Insurance issues identified  + Active MEDICARE/MEDICARE and BCBS/BCBS OF [place]     ASSESSMENT  Patient currently receives the following services:    + lives in an apartment with his wife  + L prothesis, uses a w/c outside the house for long distances   + has a wheeled walker at home   + established with PCP Dr. Hardy at [facility]   + routinely scheduled ultrasound paracentesis   + [facility] for cardiology     Identified issues/concerns regarding health management:   + therapy indicates pt would benefit from Home PT   + likely need for lab draw post-hospitalization (at least a Hgb / Hgb) and would benefit from Home RN to check vitals and draw labs.  Pt does not have his own blood pressure cuff at home and does not think his scale for daily weights is accurate.   + PCP states hospital follow-up would be face-to-face (not virtual visit) due to GI bleed, will schedule with Gracy Knight PA-C at [facility] [address] for Thursday 2:00pm April 9, 2020.   + pt was due for a CORE clinic appointment 3/30, virtual appointment obtained for 3:50pm April 8, 2020    PLAN  Financial costs for the patient include: per insurance .  Patient given options and choices for discharge: yes .  Pt/family was provided with the Medicare Compare list for Home Care.  Discussed associated Medicare star ratings to assist with choice for referrals/discharge planning Yes.  Education was given to pt/family that star ratings are updated / maintained by Medicare and can be reviewed by visiting www.medicare.gov Yes.   Patient/family is  "agreeable to the plan?  Yes, states he is not excited but is accepting.   Patient anticipates discharging to: Home with Home Care .  Patient anticipates needs for home equipment: No  Transportation/person available to transport on day of discharge  is: family    Plan/Disposition: Home   Appointments:     Previously scheduled:  Apr 06, 2020  2:00 PM   Apr 20, 2020  2:00 PM   May 04, 2020  2:00 PM   US PARACENTESIS with SHUS4, SH IMAGING NURSE, SH BODY RAD [facility] Ultrasound  [address] [address] ([phone])    Added PCP:   Apr 09, 2020  2:00 PM Office Visit with Gracy Knight PA-C [phone]   [facility] [address] [address]  NOTE: this will be in [address]     Added CORE:  Apr 08, 2020  3:50 PM Note: this is not an onsite visit; there is no need to come to the facility.  Telephone Visit with DIPAK Carlos CNP [facility]  [address]  [place] [address] ([phone] OPT 2)     Added Appointment info and Home Care contact info:   If an appointment was recommended for you please be aware:    DUE TO COVID-19 PANDEMIC, MANY CLINICS ARE TEMPORARILY NOT TAKING IN-PERSON APPOINTMENTS. The clinic may schedule you for a phone visit--please answer your phone. If you develop any symptoms or have any followup questions please call your primary care clinic. If it is an emergency, please dial 911.      It is very important to check in with your provider--during a phone or clinic visit, talk about your hospital stay.  Tell the clinic provider how you feel.  Talk about the medications listed on the discharge papers.  Your doctor will update records, make sure you are still doing OK, and decide if any tests or medication changes are needed.       Please call [facility] (\"[facility]\") for appointment: [phone]   More info online at: [email or web address]  Clinics are located in: [place], " [place], [place], [place], [place], [place], [place], [place], [place], [place], and [place]     NOTE:   If your doctor has ordered home care to help you after your hospital stay.  The staff will contact you to schedule your first visit.  This service will be provided by [facility].  If you have any question, or have not received a call within 48 hours of discharge, please call them at [phone] or [phone].  *please see homecare quality ratings for all homecares in your area at www.medicare.gov      Care  (CTS) will continue to follow as needed.    Yoly Mohr RN, BSN, PHN  [facility] Care Coordination  [facility]   Mobile: [phone]

## 2020-04-03 NOTE — PROVIDER NOTIFICATION
"Dr morel notified \"315-1 TA, R FYI HGB 6.8, Would you like us to do anything other than use the conditional transfuse orders?\". No new orders, OK to transfuse and continue to monitor.   "

## 2020-04-04 NOTE — PLAN OF CARE
VSS. A&Ox4. Tele Afib CVR. Denies pain. LS clear. Abdomen rounded, soft, non-tender. +BS, +Gas. No BM this shift. Voiding per urinal. Assist of 1 with walker/GB. LLE prosthetic on. 2 Gm Sodium diet. Denies nausea. Pill camera study completed, removed at 2200. Hgb checks q6hrs.

## 2020-04-04 NOTE — PROGRESS NOTES
[facility]  Hospitalist Progress Note  Solomon Tucker MD  04/04/2020    Assessment & Plan   Antonio Ramirez is a 75 year old male admitted on 3/31/2020 with complicated medical hx as below on chronic anticoagulation who was admitted with acute on chronic anemia due to rectal bleeding.     1. Alcohol cirrhosis with persistent ascites, portal hypertensive gastropathy, and assumed coagulapathy.  Alcohol dependence.  Severe acute GI bleed  Has standing appointment for paracentesis every 2 weeks (last 3/23) since 2016. Consume 3-4 vodka drinks per day.  No desire to quit drinking at this point. INR 3.92 on adm and reversed with vitamin K.  -- continue to hold PTA spironolactone and torsemide with soft BP in setting rectal bleeding. Indicated sporatic use PTA.  -- discontinue CIWA   -- unclear etiology, had another large blood stool  -- appreciate GI consultation  -- had pill cam yesterday  -- serial hgb q6 x 6, continue telemetry    -- discontinue xarelto    2. Acute on chronic anemia and chronic thrombocytopenia.   -- Chronic component related to ongoing alcohol use, cirrhosis, and CKD. Baseline hgb around 7.  -- 7 to 10 day history of rectal bleeding. Hgb 5.4 on adm with 5 units of PRBC transfusion.  -- EGD did not show source of bleeding. Colonoscopy with red blood in terminal ileum throughout entire examined colon, one 10 mm polyp in the ascending colon (not removed), sigmoid diverticulosis, a non-bleeding rectal varix, and non-bleeding external hemorrhoids. Tagged RBC scan suggested bleed in duodenum which was felt inaccurate as EGD showed normal duodenum.  -- [facility] following, off octreotide, had PillCam study   -- changed to po protonix  -- hgb down and had large blood stool, transfuse 1 unit of PRBC  --Cont hold ASA 81mg daily.  --IV ceftriaxone 2 gram daily.       3. CAD s/p 3v-CABG.  Ischemic cardiomyopathy with HFrEF.  Severe pulmonary hypertension.  Valvular disorder. Mild to moderate  mitral regurgitation, mild mitral stenosis, mild tricuspid regurgitation, and aortic stenosis.  -- denies any chest pain or sob  -- holding asa    4. HTN, DLD.  -- not on any bp medications  -- just diuretics    5. Hx PEA arrest and VT arrest 6/2019.  Noncompliant in review of most recent cardiology note.  --Tele, I&Os, and daily weights.  --PTA ASA and diuretics on hold as above.     6. Persistent atrial fibrillation.  Patient stated that she goes in and out of atrial fibrillation.  He is compliant with daily Xarelto.  --discontinued Xarelto   --Hold aspirin as above.     7. Antiphospholipid antibody syndrome with hx PE s/p IVC filter (2007).  --discontinued Xarelto as above.  --Hold aspirin as above.    8. CKD, stage III. Baseline Cr 1.5-1.9.     9. Chronic neuropathy. Gabapentin.       10. Left BKA.     11. Hx prostate CA and large cell B-cell non-Hodgkin's lymphoma:  S/p radioactive seeding of the prostate.  Appears to be in remission.    --No interventions necessary at this point.       Diet: 2 gram Na diet  Levin Catheter: not present  DVT Prophylaxis: Pneumatic Compression Devices  Code Status: Full Code confirmed with patient 4/2.     Expected discharge: 1-2 days, likely to home with Home PT       Interval History   -- had large blood bm and hgb down to 6.9  -- getting blood transfusion  -- will give iv Fe    -Data reviewed today: I reviewed all new labs and imaging over the last 24 hours. I personally reviewed no images or EKG's today.    Physical Exam   Heart Rate: 58, Blood pressure 106/64, pulse 54, temperature 97.7  F (36.5  C), temperature source Oral, resp. rate 21, weight 102.4 kg (225 lb 12.8 oz), SpO2 99 %.  Vitals:    03/31/20 1207 04/01/20 0609 04/04/20 0700   Weight: 100.7 kg (222 lb) 103 kg (227 lb 1.2 oz) 102.4 kg (225 lb 12.8 oz)     Vital Signs with Ranges  Temp:  [97.3  F (36.3  C)-97.7  F (36.5  C)] 97.7  F (36.5  C)  Pulse:  [54-70] 54  Heart Rate:  [52-79] 58  Resp:  [12-62] 21  BP:  ()/(53-68) 106/64  SpO2:  [93 %-99 %] 99 %  I/O's Last 24 hours  I/O last 3 completed shifts:  In: 480 [P.O.:480]  Out: 1050 [Urine:1050]    Constitutional: Awake, alert, cooperative, no apparent distress  Respiratory: Clear to auscultation bilaterally, no crackles or wheezing  Cardiovascular: Regular rate and rhythm, normal S1 and S2, + murmur noted  GI: Normal bowel sounds, soft, distended, firm and non-tender  Skin/Integumen: No rashes, no cyanosis, + edema, L BKA, brusing  Other:      Medications   All medications were reviewed.    sodium chloride 10 mL/hr at 04/02/20 1415       cefTRIAXone  2 g Intravenous Q24H     folic acid  1 mg Oral Daily     gabapentin  600 mg Oral BID     iron sucrose (VENOFER) intermittent infusion  300 mg Intravenous Daily     multivitamin w/minerals  1 tablet Oral Daily     sodium chloride (PF)  3 mL Intracatheter Q8H     vitamin B1  100 mg Oral Daily        Data   Recent Labs   Lab 04/04/20  1100 04/04/20  0450 04/03/20  2241  04/02/20  0409  04/01/20  0636  03/31/20  1001   WBC  --  3.8*  --   --  4.9  --  4.5  --  4.3   HGB 6.9* 7.2*  Canceled, Test credited 7.4*   < > 8.1*   < > 7.3*   < > 5.4*   MCV  --  82  --   --  81  --  79  --  74*   PLT  --  121*  --   --  123*  --  129*  --  175   INR  --   --   --   --   --   --  2.29*  --  3.92*   NA  --  132*  --   --  135  --  135  --  139   POTASSIUM  --  3.7  --   --  3.4  --  3.8  --  3.8   CHLORIDE  --  102  --   --  104  --  103  --  106   CO2  --  26  --   --  27  --  25  --  25   BUN  --  30  --   --  32*  --  35*  --  35*   CR  --  1.84*  --   --  1.74*  --  1.69*  --  1.59*   ANIONGAP  --  4  --   --  4  --  7  --  8   BENNIE  --  8.0*  --   --  8.1*  --  8.4*  --  8.7   GLC  --  91  --   --  107*  --  89  --  103*   ALBUMIN  --   --   --   --   --   --  2.4*  --  2.6*   PROTTOTAL  --   --   --   --   --   --  6.0*  --  6.6*   BILITOTAL  --   --   --   --   --   --  1.4*  --  0.7   ALKPHOS  --   --   --   --   --   --  67   --  76   ALT  --   --   --   --   --   --  11  --  13   AST  --   --   --   --   --   --  25  --  23    < > = values in this interval not displayed.       No results found for this or any previous visit (from the past 24 hour(s)).    Solomon Tucker MD  Text Page  (7am to 6pm)

## 2020-04-04 NOTE — PROVIDER NOTIFICATION
MD Notification    Notified Person: MD    Notified Person Name: Dr. Tucker    Notification Date/Time: 11:30 4/4/20    Notification Interaction: Paged    Purpose of Notification: Hgb 6.9, will release conditional order for 1 unit PRBC.

## 2020-04-04 NOTE — PLAN OF CARE
"PT:  Attempted to see patient for PT this AM. Patient rolling eyes when PT introduced self and educated on need to progress mobility. Patient not receptive to working with PT and states \"I'll be here for days to come so you'll have your chance.\" Attempted to educate on progressive weakness with prolonged time in bed, patient chuckles and states he doesn't see the point of working with PT. Educated on getting up and mobilizing with RN throughout the day if not going to work with PT, patient continually declines and rolling eyes at any attempts to engage/educate. RN aware.   "

## 2020-04-04 NOTE — PROGRESS NOTES
[facility] - GASTROENTEROLOGY PROGRESS NOTE     IMPRESSION:  1. Overt, obscure GI bleed - EGD / negative for a source, colonoscopy  showed red blood throughout, suspicious for small bowel source. Tagged RBC 4/ positive for upper duodenal bleed, but EGD that day negative for any blood, thus possibly it is more distal in small bowel.   - Capsule endoscopy yesterday, equipment picked up and left in endoscopy by 0830 (before 0900) for  to pick-up, as instructed.  - Bloody stool last night and 1u pRBC given yesterday, hgb after 7.5, now 7.2    RECOMMENDATIONS:  - If more significant bleeding, will need to hold Xarelto  - If severe/life-threatening bleeding, will need to consider IR/angio  - Monitor patient overnight  - Capsule results will not be available until late Monday afternoon at the earliest    Coy Miller  [facility] - Digestive Health  Cell [phone]  After 5 PM, please call [phone]  ________________________________________________________________________      SUBJECTIVE:  Patient did have a bloody stool yesterday, pRBC x 1 given.       OBJECTIVE:  BP 99/59   Pulse 67   Temp 97.5  F (36.4  C) (Oral)   Resp 14   Wt 102.4 kg (225 lb 12.8 oz)   SpO2 94%   BMI 28.99 kg/m    Temp (24hrs), Av.9  F (36.6  C), Min:97.5  F (36.4  C), Max:98.4  F (36.9  C)    Patient Vitals for the past 72 hrs:   Weight   20 0700 102.4 kg (225 lb 12.8 oz)        PHYSICAL EXAM  GEN: Alert, oriented x3, NAD.  Remainder of exam deferred.    Additional Data:  I have reviewed the patient's new clinical lab results:     Recent Labs   Lab Test 20  0450 20  2241 20  1704  20  0409  20  0636  20  1001 20  1306   WBC 3.8*  --   --   --  4.9  --  4.5  --  4.3  --    HGB 7.2*  Canceled, Test credited 7.4* 7.5*   < > 8.1*   < > 7.3*   < > 5.4*  --    MCV 82  --   --   --  81  --  79  --  74*  --    *  --   --   --  123*  --  129*  --  175  --    INR  --   --   --   --   --    --  2.29*  --  3.92* 1.42*    < > = values in this interval not displayed.     Recent Labs   Lab Test 04/04/20  0450 04/02/20  0409 04/01/20  0636   POTASSIUM 3.7 3.4 3.8   CHLORIDE 102 104 103   CO2 26 27 25   BUN 30 32* 35*   ANIONGAP 4 4 7     Recent Labs   Lab Test 04/01/20  0636 03/31/20  1001 09/27/19  1045  06/11/19  0220  03/03/19  1320 02/28/19  1635   ALBUMIN 2.4* 2.6* 1.9*   < >  --    < >  --   --    BILITOTAL 1.4* 0.7 1.0   < >  --    < >  --   --    ALT 11 13 10   < >  --    < >  --   --    AST 25 23 27   < >  --    < >  --   --    PROTEIN  --   --   --   --  10*  --  10* 10*    < > = values in this interval not displayed.

## 2020-04-04 NOTE — PLAN OF CARE
A&O, VSS. Lung sounds diminished., Bowel sounds active, +loose dark bloody stool today x2. Adequate urine output. Ambulates assist x1, needs encouragement. Tolerating 2g Na diet. Denies pain. 1 unit of PRBC given, Hgb recheck came back 8.2. Tele: A fib CVR

## 2020-04-05 NOTE — PROGRESS NOTES
SPIRITUAL HE[facility] SERVICES Progress Note  [facility]    Initiated visit due to LOS.  Pt was alone in room.  Pt declined visit at this time.  SH remains available upon pt request.      Gaurav Young  Chaplain Resident

## 2020-04-05 NOTE — PLAN OF CARE
A&O, VSS.  Bowel sounds active, -BM today. Adequate urine output. Previous L AKA. Ambulates assist x1- needs encouragement.  Tolerating 2g NA diet. Denies pain. New IV placed.  Tele: Afib CVR. Q6hr Hgb checks, last one 7.2.

## 2020-04-05 NOTE — PROGRESS NOTES
[facility] note - chart check    Hgb stable. Unfortuntely  did not  the capsule study this week. This will need be sorted out Monday AM and determine how quickly a read can occur.    Coy Miller MD   [facility] - Digestive Health  Cell [phone]  After 5 PM, please call [phone]

## 2020-04-05 NOTE — PLAN OF CARE
Rested well overnight. Denies pain. No stools overnight. Q6 hgb checks, last one 7.8 at midnight. Awaiting capsule study results. VSS. Room air. Tele afib CVR. Ambulating Ax1 with prosthesis. 2g sodium diet. GI following. PT recommending discharge to home with PT.

## 2020-04-05 NOTE — PROGRESS NOTES
[facility]  Hospitalist Progress Note  Solomon Tucker MD  04/05/2020    Assessment & Plan   Antonio Ramirez is a 75 year old male admitted on 3/31/2020 with complicated medical hx as below on chronic anticoagulation who was admitted with acute on chronic anemia due to rectal bleeding.     1. Alcohol cirrhosis with persistent ascites, portal hypertensive gastropathy, and assumed coagulapathy.  Alcohol dependence.  Severe acute GI bleed  Has standing appointment for paracentesis every 2 weeks (last 3/23) since 2016. Consume 3-4 vodka drinks per day.  No desire to quit drinking at this point. INR 3.92 on adm and reversed with vitamin K.  -- continue to hold PTA spironolactone and torsemide with soft BP in setting rectal bleeding. Indicated sporatic use PTA.  -- discontinue CIWA   -- unclear etiology, had another large blood stool 48 hrs ago  -- appreciate GI consultation  -- had pill cam 4/3 with results pending  -- serial hgb q6 x 6, shows slow drift down  -- discontinued xarelto  -- if ongoing bleeding, then may need angiogram    2. Acute on chronic anemia and chronic thrombocytopenia.   -- Chronic component related to ongoing alcohol use, cirrhosis, and CKD. Baseline hgb around 7.  -- 7 to 10 day history of rectal bleeding. Hgb 5.4 on adm with 5 units of PRBC transfusion.  -- EGD did not show source of bleeding. Colonoscopy with red blood in terminal ileum throughout entire examined colon, one 10 mm polyp in the ascending colon (not removed), sigmoid diverticulosis, a non-bleeding rectal varix, and non-bleeding external hemorrhoids. Tagged RBC scan suggested bleed in duodenum which was felt inaccurate as EGD showed normal duodenum.  -- [facility] following, off octreotide, had PillCam study   -- changed to po protonix  -- hgb drifting down and had large blood stool, transfused 1 unit of PRBC on 4/4  --Cont hold ASA 81mg daily.  --IV ceftriaxone 2 gram daily.    --continue to follow hgb     3. CAD s/p  3v-CABG.  Ischemic cardiomyopathy with HFrEF.  Severe pulmonary hypertension.  Valvular disorder. Mild to moderate mitral regurgitation, mild mitral stenosis, mild tricuspid regurgitation, and aortic stenosis.  -- denies any chest pain or sob  -- holding asa    4. HTN, DLD.  -- not on any bp medications  -- just diuretics    5. Hx PEA arrest and VT arrest 6/2019.  Noncompliant in review of most recent cardiology note.  --Tele, I&Os, and daily weights.  --PTA ASA and diuretics on hold as above.     6. Persistent atrial fibrillation.  Patient stated that she goes in and out of atrial fibrillation.  He is compliant with daily Xarelto.  --discontinued Xarelto   --Hold aspirin as above.     7. Antiphospholipid antibody syndrome with hx PE s/p IVC filter (2007).  --discontinued Xarelto as above.  --Hold aspirin as above.    8. CKD, stage III. Baseline Cr 1.5, currently Cr 1.6     9. Chronic neuropathy. Gabapentin.       10. Left BKA.     11. Hx prostate CA and large cell B-cell non-Hodgkin's lymphoma:  S/p radioactive seeding of the prostate.  Appears to be in remission.    --No interventions necessary at this point.       Diet: 2 gram Na diet  Levin Catheter: not present  DVT Prophylaxis: Pneumatic Compression Devices  Code Status: Full Code confirmed with patient 4/2.     Expected discharge: 1-2 days awaiting hgb stabilization, likely to home with Home PT       Interval History   -- received IV Fe  -- no bloody stools    -Data reviewed today: I reviewed all new labs and imaging over the last 24 hours. I personally reviewed no images or EKG's today.    Physical Exam   Heart Rate: 56, Blood pressure 97/50, pulse 55, temperature 97.7  F (36.5  C), temperature source Oral, resp. rate 16, weight 102.4 kg (225 lb 12.8 oz), SpO2 94 %.  Vitals:    03/31/20 1207 04/01/20 0609 04/04/20 0700   Weight: 100.7 kg (222 lb) 103 kg (227 lb 1.2 oz) 102.4 kg (225 lb 12.8 oz)     Vital Signs with Ranges  Temp:  [97.5  F (36.4  C)-97.8  F  (36.6  C)] 97.7  F (36.5  C)  Pulse:  [55-70] 55  Heart Rate:  [53-59] 56  Resp:  [16] 16  BP: ()/(50-61) 97/50  SpO2:  [94 %-97 %] 94 %  I/O's Last 24 hours  I/O last 3 completed shifts:  In: 240 [P.O.:240]  Out: 1000 [Urine:1000]    Constitutional: Awake, alert, cooperative, no apparent distress  Respiratory: Clear to auscultation bilaterally, no crackles or wheezing  Cardiovascular: Regular rate and rhythm, normal S1 and S2, + murmur noted  GI: Normal bowel sounds, soft, distended, firm and non-tender  Skin/Integumen: No rashes, no cyanosis, + edema, L BKA, brusing  Other:      Medications   All medications were reviewed.      cefTRIAXone  2 g Intravenous Q24H     gabapentin  600 mg Oral BID     vitamin B1  100 mg Oral Daily        Data   Recent Labs   Lab 04/05/20  1300 04/05/20  0654 04/04/20  2345  04/04/20  0450  04/02/20  0409  04/01/20  0636  03/31/20  1001   WBC  --  4.0  --   --  3.8*  --  4.9  --  4.5  --  4.3   HGB 7.2* 7.5* 7.8*  Canceled, Test credited   < > 7.2*  Canceled, Test credited   < > 8.1*   < > 7.3*   < > 5.4*   MCV  --  83  --   --  82  --  81  --  79  --  74*   PLT  --  126*  --   --  121*  --  123*  --  129*  --  175   INR  --   --   --   --   --   --   --   --  2.29*  --  3.92*   NA  --  135  --   --  132*  --  135  --  135  --  139   POTASSIUM  --  3.7  --   --  3.7  --  3.4  --  3.8  --  3.8   CHLORIDE  --  103  --   --  102  --  104  --  103  --  106   CO2  --  28  --   --  26  --  27  --  25  --  25   BUN  --  28  --   --  30  --  32*  --  35*  --  35*   CR  --  1.64*  --   --  1.84*  --  1.74*  --  1.69*  --  1.59*   ANIONGAP  --  4  --   --  4  --  4  --  7  --  8   BENNIE  --  8.0*  --   --  8.0*  --  8.1*  --  8.4*  --  8.7   GLC  --  113*  --   --  91  --  107*  --  89  --  103*   ALBUMIN  --   --   --   --   --   --   --   --  2.4*  --  2.6*   PROTTOTAL  --   --   --   --   --   --   --   --  6.0*  --  6.6*   BILITOTAL  --   --   --   --   --   --   --   --  1.4*  --  0.7    ALKPHOS  --   --   --   --   --   --   --   --  67  --  76   ALT  --   --   --   --   --   --   --   --  11  --  13   AST  --   --   --   --   --   --   --   --  25  --  23    < > = values in this interval not displayed.       No results found for this or any previous visit (from the past 24 hour(s)).    Solomon Tucker MD  Text Page  (7am to 6pm)

## 2020-04-05 NOTE — PLAN OF CARE
"PT:  Attempted to see patient for PT this PM. Patient declining working with PT at this time, when asked if he had been up out of bed today he states \"No and I am happy with it.\" Attempted to educate at needing to progress mobility, patient starts to laugh and shakes head at this writer. Educated on patient's hopeful discharge to home, continued refusals with therapy, and progressive weakness to which patient states \"I'll worry about that then.\" Asked patient if he would like therapy to continue to stop by as he as continued to refuse and obviously doesn't want to participate, he states, \"If I'm here next week you can sure stop by and try\" then laughs. Decreasing frequency.  "

## 2020-04-06 NOTE — PLAN OF CARE
"PT: Attempted PT intervention with RN present. Pt adamantly declines participation in any PT activities while here in the hospital. Pt educated on benefits of mobility and participation while in hospital, however, continues to decline. Pt reports that he will perform home health PT when he discharges home, however, requests to stop all PT interventions during this hospital stay. Pt reports that he has \"a system\" at home and feels very confident about discharging home when medically cleared. RN and MD aware, will discharge from PT and complete orders.    Physical Therapy Discharge Summary    Reason for therapy discharge:    Patient/family request discontinuation of services.    Progress towards therapy goal(s). See goals on Care Plan in [facility] electronic health record for goal details.  Goals not met.  Barriers to achieving goals:   Pt declines participation in therapy.    Therapy recommendation(s):    Continued therapy is recommended.  Rationale/Recommendations:  Home health PT recommended to increase mobility and safety in home environment.          "

## 2020-04-06 NOTE — CONSULTS
"Care Coordination:    CC re consulted for home care needs.    Please refer to consult done 4/3.  Pt agreed to Home care PT (though RN would be helpful as well) through [facility].  Referral was sent and they are following.  Orders and F2F needed at discharge.   CC met with patient and tried to review plan for discharge home with home care and follow up appts arranged (again refer to 4/3 consult)  Apr 06, 2020  2:00 PM   Apr 20, 2020  2:00 PM   May 04, 2020  2:00 PM   US PARACENTESIS with SHUS4, SH IMAGING NURSE, SH BODY RAD [facility]  [address] [facility] [address] ([phone])     Added PCP:   Apr 09, 2020  2:00 PM Office Visit with Gracy Knight PA-C [phone]   [facility] [address] [address]  NOTE: this will be in [address]      Added CORE:  Apr 08, 2020  3:50 PM Note: this is not an onsite visit; there is no need to come to the facility.  Telephone Visit with DIPAK Carlos CNP [facility]  [address]  [place] [address] ([phone] OPT 2)      Patient seemed to get very agitated and confused in discussing this and wondering why CC was talking about follow up when he was \" needing blood every 8 hours.\"  Reviewed that this would be the plan once patient was stable and medically cleared for discharge.  Pt appeared to voice understanding.  Pt now going down for paracentesis.  Discharge date is pending GI plan.  Follow up appointments may need to be moved if patient is here past today.  CC to follow for discharge plan.  Bedside nurse updated.     Florence Johnson, RN BSN  Inpatient Care Coordination  [facility]  [phone]  "

## 2020-04-06 NOTE — PROGRESS NOTES
Paracentesis: Pt tolerated well. VSS. 3100 cc rust colored/cloudy fluid removed from abdomen w/o difficulty. Surgical glue and Bandaid applied to site - CDI.     1422 Pt back to rm 315-1 per cart & transport.

## 2020-04-06 NOTE — PROGRESS NOTES
"[facility]  Hospitalist Progress Note  Cathie Burgess MD  04/06/2020    Assessment & Plan   Antonio Ramirez is a 75 year old male admitted on 3/31/2020 with complicated medical hx as below on chronic anticoagulation who was admitted with acute on chronic anemia due to rectal bleeding.     1. Alcohol cirrhosis with persistent ascites, portal hypertensive gastropathy, and assumed coagulapathy.  Alcohol dependence.  Severe acute GI bleed  Has standing appointment for paracentesis every 2 weeks (last 3/23) since 2016. Consume 3-4 vodka drinks per day.  No desire to quit drinking at this point. INR 3.92 on adm and reversed with vitamin K.  -- continue to hold PTA spironolactone and torsemide with soft BP in setting rectal bleeding. Indicated sporatic use PTA.  -- discontinue CIWA   -- unclear etiology, no black stools today  -- appreciate GI consultation.    -- had pill cam 4/3 with result still pending  -- serial hgb q6 x 6, shows slow drift down  -- discontinued xarelto, asa on hold  -- oer Dr. Miller, [facility], \"If severe/life-threatening bleeding, will need to consider IR/angio\"    2. Acute on chronic anemia and chronic thrombocytopenia.   -- Chronic component related to ongoing alcohol use, cirrhosis, and CKD. Baseline hgb around 7.  -- 7 to 10 day history of rectal bleeding. Hgb 5.4 on adm with 5 units of PRBC transfusion.  -- EGD did not show source of bleeding. Colonoscopy with red blood in terminal ileum throughout entire examined colon, one 10 mm polyp in the ascending colon (not removed), sigmoid diverticulosis, a non-bleeding rectal varix, and non-bleeding external hemorrhoids. Tagged RBC scan suggested bleed in duodenum which was felt inaccurate as EGD showed normal duodenum.  -- [facility] following, off octreotide, had PillCam study   -- changed to po protonix  -- hgb drifting down and had large blood stool, transfused 1 unit of PRBC on 4/4  --Cont hold ASA 81mg daily.  --IV ceftriaxone 2 gram " "daily.    --continue to follow hgb     3. CAD s/p 3v-CABG.  Ischemic cardiomyopathy with HFrEF.  Severe pulmonary hypertension.  Valvular disorder. Mild to moderate mitral regurgitation, mild mitral stenosis, mild tricuspid regurgitation, and aortic stenosis.  -- denies any chest pain or sob  -- holding asa    4. HTN, DLD.  -- not on any bp medications  -- just diuretics    5. Hx PEA arrest and VT arrest 6/2019.  Noncompliant in review of most recent cardiology note.  --Tele, I&Os, and daily weights.  --PTA ASA and diuretics on hold as above.     6. Persistent atrial fibrillation.  Patient stated that she goes in and out of atrial fibrillation.  He is compliant with daily Xarelto.  --discontinued Xarelto   --Hold aspirin as above.     7. Antiphospholipid antibody syndrome with hx PE s/p IVC filter (2007).  --discontinued Xarelto as above.  --Hold aspirin as above.    8. CKD, stage III. Baseline Cr 1.5, currently Cr 1.6     9. Chronic neuropathy. Gabapentin.       10. Left BKA.     11. Hx prostate CA and large cell B-cell non-Hodgkin's lymphoma:  S/p radioactive seeding of the prostate.  Appears to be in remission.    --No interventions necessary at this point.       Diet: 2 gram Na diet  Levin Catheter: not present  DVT Prophylaxis: Pneumatic Compression Devices  Code Status: Full Code confirmed with patient 4/2.     Expected discharge: 1-2 days awaiting hgb stabilization, likely to home with Home PT    Cathie Burgess MD  Hospitalist  [facility]  Text Page (7am - 6pm, M-F)      Interval History   \"I don't want to go home.\"  Patient gives lengthy account of recent events.  He is upset by PT visit (\"they don't need to come in here and irritate me anymore\") and hospitalist visit (\"you're out of the loop\").  No new respiratory or GI complaints.   Said he had a PRBC transfusion this morning, has not had blood transfusion since 4/4.    -Data reviewed today: I reviewed all new labs and imaging over the " last 24 hours. I personally reviewed no images or EKG's today.    Physical Exam   Heart Rate: 65, Blood pressure 118/52, pulse 63, temperature (P) 97.4  F (36.3  C), temperature source (P) Axillary, resp. rate 16, weight 102.4 kg (225 lb 12.8 oz), SpO2 100 %.  Vitals:    03/31/20 1207 04/01/20 0609 04/04/20 0700   Weight: 100.7 kg (222 lb) 103 kg (227 lb 1.2 oz) 102.4 kg (225 lb 12.8 oz)     Vital Signs with Ranges  Temp:  [97.4  F (36.3  C)-97.8  F (36.6  C)] (P) 97.4  F (36.3  C)  Pulse:  [52-65] 63  Heart Rate:  [56-65] 65  Resp:  [16-18] 16  BP: ()/(50-63) 118/52  SpO2:  [94 %-100 %] 100 %  I/O's Last 24 hours  I/O last 3 completed shifts:  In: 240 [P.O.:240]  Out: 1150 [Urine:1150]    Constitutional: Awake, alert, cooperative, no apparent distress  Respiratory: Clear to auscultation bilaterally, no crackles or wheezing  Cardiovascular: Regular rate and rhythm, normal S1 and S2, + murmur noted  GI: Normal bowel sounds, soft, distended, firm and non-tender  Skin/Integumen: No rashes, no cyanosis, + edema, L BKA, brusing  Other:      Medications   All medications were reviewed.    - MEDICATION INSTRUCTIONS -         cefTRIAXone  2 g Intravenous Q24H     gabapentin  600 mg Oral BID     lidocaine (buffered or not buffered)  5 mL Intradermal Once     sodium chloride (PF)  3 mL Intracatheter Q8H        Data   Recent Labs   Lab 04/06/20  0744 04/06/20  0048 04/05/20  1830  04/05/20  0654  04/04/20  0450  04/01/20  0636  03/31/20  1001   WBC 4.3  --   --   --  4.0  --  3.8*   < > 4.5  --  4.3   HGB 7.2* 7.6* 7.6*   < > 7.5*   < > 7.2*  Canceled, Test credited   < > 7.3*   < > 5.4*   MCV 82  --   --   --  83  --  82   < > 79  --  74*   *  --   --   --  126*  --  121*   < > 129*  --  175   INR  --   --   --   --   --   --   --   --  2.29*  --  3.92*     --   --   --  135  --  132*   < > 135  --  139   POTASSIUM 3.8  --   --   --  3.7  --  3.7   < > 3.8  --  3.8   CHLORIDE 107  --   --   --  103  --   102   < > 103  --  106   CO2 25  --   --   --  28  --  26   < > 25  --  25   BUN 25  --   --   --  28  --  30   < > 35*  --  35*   CR 1.47*  --   --   --  1.64*  --  1.84*   < > 1.69*  --  1.59*   ANIONGAP 6  --   --   --  4  --  4   < > 7  --  8   BENNIE 8.1*  --   --   --  8.0*  --  8.0*   < > 8.4*  --  8.7   GLC 93  --   --   --  113*  --  91   < > 89  --  103*   ALBUMIN  --   --   --   --   --   --   --   --  2.4*  --  2.6*   PROTTOTAL  --   --   --   --   --   --   --   --  6.0*  --  6.6*   BILITOTAL  --   --   --   --   --   --   --   --  1.4*  --  0.7   ALKPHOS  --   --   --   --   --   --   --   --  67  --  76   ALT  --   --   --   --   --   --   --   --  11  --  13   AST  --   --   --   --   --   --   --   --  25  --  23    < > = values in this interval not displayed.       No results found for this or any previous visit (from the past 24 hour(s)).

## 2020-04-06 NOTE — PROVIDER NOTIFICATION
Paged Dr Boyd re: chart check note. Patient asking questions about results, how long he needs to remain inpatient.   Also clarifying when hgb should be rechecked, keep holding YVONNE husain?     Phone conversation with Dr. Boyd. Orders to recheck hgb at 1700. Inform patient that GI will communicate further with him once results of pill cam study are in. Continue with current plan of care.

## 2020-04-06 NOTE — PROGRESS NOTES
GI chart check    Chart reviewed. Hgb stable, no BM overnight.    Still waiting for read on small bowel pill cam.    Recommend continuing current treatment.    Katherin Boyd MD  [facility]  Pager [phone] until 5 pm  Office [phone] for after hours on call provider

## 2020-04-06 NOTE — PLAN OF CARE
Vital signs stable on RA, SBP high 90s/low 100s. Tele afib CVR, HR 50-70s most of shift. A/Ox4, forgetful at times. LS diminished. BS active. Smear brown/black bm this evening. Na restrict diet, denies n/v. Voiding to urinal adequately. Denies pain. Cleansed L stump + changed sock per patient's home regimen w/ supplies wife dropped off. Skin intact, mepilex used to offload pressure. R leg willis, +2 edema, patient endorses baseline lymphedema.     Paracentesis done this afternoon. Hgbs 7.2 and 7.7 today

## 2020-04-06 NOTE — PLAN OF CARE
Denies pain. No stools overnight. Q6 hgb checks stable, last one 7.6 . Awaiting capsule study results. VSS. Room air. Tele afib CVR. Ambulating Ax1 with prosthesis. 2g sodium diet. GI following. PT recommending discharge to home with PT.

## 2020-04-06 NOTE — PROCEDURES
[facility]    Procedure: Paracentesis    Date/Time: 4/6/2020 2:18 PM  Performed by: Mary Pittman DO  Authorized by: Mary Pittman DO     UNIVERSAL PROTOCOL   Site Marked: Yes  Prior Images Obtained and Reviewed:  Yes  Required items: Required blood products, implants, devices and special equipment available    Patient identity confirmed:  Verbally with patient, arm band, provided demographic data and hospital-assigned identification number  Patient was reevaluated immediately before administering moderate or deep sedation or anesthesia  Confirmation Checklist:  Patient's identity using two indicators, relevant allergies, procedure was appropriate and matched the consent or emergent situation and correct equipment/implants were available  Time out: Immediately prior to the procedure a time out was called    Universal Protocol: the Joint Commission Universal Protocol was followed    Preparation: Patient was prepped and draped in usual sterile fashion           ANESTHESIA    Anesthesia: Local infiltration  Local Anesthetic:  Lidocaine 1% without epinephrine      SEDATION    Patient Sedated: No    See dictated procedure note for full details.  Findings: paracentesis    Specimens: none    Complications: None    Condition: Stable    Plan: Repeat as needed.    PROCEDURE   Patient Tolerance:  Patient tolerated the procedure well with no immediate complications    Length of time physician/provider present for 1:1 monitoring during sedation: 0

## 2020-04-07 NOTE — PLAN OF CARE
A&O x 4, AVSS on room air. Denied pain. Pt had one brown dark red stool.  Up with 1-2 assist. No new complain.

## 2020-04-07 NOTE — PROGRESS NOTES
GI    Plan for enteroscopy tomorrow at about 1 pm    Katherin Boyd MD  [facility]  Pager [phone] until 5 pm  Office [phone] for after hours on call provider

## 2020-04-07 NOTE — PROGRESS NOTES
GASTROENTEROLOGY PROGRESS NOTE    CC:     SUBJECTIVE:  Pt had maroon stool last pm. Feels like he is getting vague, evasive information from his care providers.    OBJECTIVE:    /54 (BP Location: Left arm)   Pulse 61   Temp 98  F (36.7  C) (Oral)   Resp 16   Wt 102.6 kg (226 lb 3.1 oz)   SpO2 93%   BMI 29.04 kg/m    Temp (24hrs), Av  F (36.7  C), Min:97.7  F (36.5  C), Max:98.3  F (36.8  C)    Patient Vitals for the past 72 hrs:   Weight   20 0500 102.6 kg (226 lb 3.1 oz)       Intake/Output Summary (Last 24 hours) at 2020 1128  Last data filed at 2020 0900  Gross per 24 hour   Intake 400 ml   Output 640 ml   Net -240 ml       PHYSICAL EXAM    Gen: awake alert NAD        Additional Comments:  ROS, FH, SH: See initial GI consult for details.    I have reviewed the patient's new clinical lab results:    Recent Labs   Lab Test 20  1105 20  1742 20  0744  20  0654  20  0450  20  0636  20  1001   WBC  --   --  4.3  --  4.0  --  3.8*   < > 4.5  --  4.3   HGB 7.0* 7.7* 7.2*   < > 7.5*   < > 7.2*  Canceled, Test credited   < > 7.3*   < > 5.4*   MCV  --   --  82  --  83  --  82   < > 79  --  74*   PLT  --   --  148*  --  126*  --  121*   < > 129*  --  175   INR  --  1.34*  --   --   --   --   --   --  2.29*  --  3.92*    < > = values in this interval not displayed.     Recent Labs   Lab Test 20  0744 20  0654 20  0450   POTASSIUM 3.8 3.7 3.7   CHLORIDE 107 103 102   CO2 25 28 26   BUN 25 28 30   ANIONGAP 6 4 4     Recent Labs   Lab Test 20  0636 20  1001 19  1045  19  0220  19  1320 19  1635   ALBUMIN 2.4* 2.6* 1.9*   < >  --    < >  --   --    BILITOTAL 1.4* 0.7 1.0   < >  --    < >  --   --    ALT 11 13 10   < >  --    < >  --   --    AST 25 23 27   < >  --    < >  --   --    PROTEIN  --   --   --   --  10*  --  10* 10*    < > = values in this interval not displayed.         Active Problems:  1. GI  bleed: likely upper GI source given tagged RBC findings. Pill cam read today. Pill cam did not leave his stomach the 12 hours it was on so no helpful information gained. Hgb fairly steady for 2 days but slight drop in hgb and passing maroon stool may indicate ongoing bleeding.  -Will discuss utility of proceeding with enteroscopy tomorrow with hospitalist. Awaiting call back. Pt not NPO today  -Follow hgb, transfusions per hospitalist service  -Continue PPI      Katherin Boyd MD  [facility]  Pager: [phone]  Office: [phone]

## 2020-04-07 NOTE — PROGRESS NOTES
"[facility]  Hospitalist Progress Note  Cathie Burgess MD  04/07/2020    Assessment & Plan   Antonio Ramirez is a 75 year old male admitted on 3/31/2020 with complicated medical hx as below on chronic anticoagulation who was admitted with acute on chronic anemia due to rectal bleeding.     1. Alcohol cirrhosis with persistent ascites, portal hypertensive gastropathy, and assumed coagulapathy.  Alcohol dependence.  Severe acute GI bleed  Has standing appointment for paracentesis every 2 weeks (last 3/23) since 2016. Consume 3-4 vodka drinks per day.  No desire to quit drinking at this point. INR 3.92 on adm and reversed with vitamin K.  -- continue to hold PTA spironolactone and torsemide with soft BP in setting rectal bleeding. Indicated sporatic use PTA.  -- discontinued CIWA   -- unclear etiology, had a maroon colored stool last evening, per notes  -hemoglobin is stable  -- appreciate GI follow-up, I discussed with Dr. Boyd  -- had pill cam 4/3 with result still pending  -- discontinued xarelto, asa on hold  -- per Dr. Miller, [facility], \"If severe/life-threatening bleeding, will need to consider IR/angio\"  --Plan is enteroscopy tomorrow; it is possible there are blood vessels to cauterize or clip    2. Acute on chronic anemia and chronic thrombocytopenia.   -- Chronic component related to ongoing alcohol use, cirrhosis, and CKD. Baseline hgb around 7.  -- 7 to 10 day history of rectal bleeding. Hgb 5.4 on adm with 5 units of PRBC transfusion.  -- EGD did not show source of bleeding. Colonoscopy with red blood in terminal ileum throughout entire examined colon, one 10 mm polyp in the ascending colon (not removed), sigmoid diverticulosis, a non-bleeding rectal varix, and non-bleeding external hemorrhoids. Tagged RBC scan suggested bleed in duodenum which was felt inaccurate as EGD showed normal duodenum.  -- [facility] following, off octreotide, had PillCam study   -- changed to po " "protonix  --transfused 1 unit of PRBC on 4/4  --Cont hold ASA 81mg daily.  --IV ceftriaxone 2 gram daily for SBP prophylaxis.  Pharm.D. recommends continuing parenteral antibiotics since procedure is planned for tomorrow  --continue to follow hgb     3. CAD s/p 3v-CABG.  Ischemic cardiomyopathy with HFrEF.  Severe pulmonary hypertension.  Valvular disorder. Mild to moderate mitral regurgitation, mild mitral stenosis, mild tricuspid regurgitation, and aortic stenosis.  -- denies any chest pain or sob  -- holding asa    4. HTN, DLD.  -- not on any bp medications  -- just diuretics    5. Hx PEA arrest and VT arrest 6/2019.  Noncompliant in review of most recent cardiology note.  --Tele, I&Os, and daily weights.  --PTA ASA and diuretics on hold as above.     6. Persistent atrial fibrillation.  Patient stated that she goes in and out of atrial fibrillation.  He is compliant with daily Xarelto.  --discontinued Xarelto   --Hold aspirin as above.     7. Antiphospholipid antibody syndrome with hx PE s/p IVC filter (2007).  --discontinued Xarelto as above.  --Hold aspirin as above.    8. CKD, stage III. Baseline Cr 1.5, currently Cr 1.47     9. Chronic neuropathy. Gabapentin.       10. Left BKA.     11. Hx prostate CA and large cell B-cell non-Hodgkin's lymphoma:  S/p radioactive seeding of the prostate.  Appears to be in remission.    --No interventions necessary at this point.       Diet: 2 gram Na diet  Levin Catheter: not present  DVT Prophylaxis: Pneumatic Compression Devices  Code Status: Full Code confirmed with patient 4/2.     Expected discharge: 1-2 days awaiting hgb stabilization, likely to home with Home PT    Cathie Burgess MD  Hospitalist  [facility]  Text Page (7am - 6pm, M-F)      Interval History   \"What test to my having tomorrow?\"  Patient describes having conversation with gastroenterologist, he asks what could be identified with enteroscopy.  He denies abdominal pain.  No new " respiratory complaints.  Asks about anticoagulant medications.    -Data reviewed today: I reviewed all new labs and imaging over the last 24 hours. I personally reviewed no images or EKG's today.    Physical Exam   Heart Rate: 65, Blood pressure 103/54, pulse 61, temperature 98  F (36.7  C), temperature source Oral, resp. rate 16, weight 102.6 kg (226 lb 3.1 oz), SpO2 93 %.  Vitals:    04/01/20 0609 04/04/20 0700 04/07/20 0500   Weight: 103 kg (227 lb 1.2 oz) 102.4 kg (225 lb 12.8 oz) 102.6 kg (226 lb 3.1 oz)     Vital Signs with Ranges  Temp:  [97.7  F (36.5  C)-98.3  F (36.8  C)] 98  F (36.7  C)  Pulse:  [56-61] 61  Heart Rate:  [61-67] 65  Resp:  [16] 16  BP: ()/(54-58) 103/54  SpO2:  [93 %-98 %] 93 %  I/O's Last 24 hours  I/O last 3 completed shifts:  In: 640 [P.O.:640]  Out: 950 [Urine:950]    Constitutional: Awake, alert  Respiratory: Clear to auscultation bilaterally, no crackles or wheezing  Cardiovascular: Regular rate and rhythm, normal S1 and S2, + murmur noted  GI: Normal bowel sounds, soft, distended, firm and non-tender  Skin/Integumen: No rashes, no cyanosis, + edema, L BKA, brusing  Other: Mood is less agitated today    Medications   All medications were reviewed.    - MEDICATION INSTRUCTIONS -         cefTRIAXone  2 g Intravenous Q24H     gabapentin  600 mg Oral BID     lidocaine (buffered or not buffered)  5 mL Intradermal Once     multivitamin w/minerals  1 tablet Oral Daily     sodium chloride (PF)  3 mL Intracatheter Q8H        Data   Recent Labs   Lab 04/07/20  1105 04/06/20  1742 04/06/20  0744  04/05/20  0654  04/04/20  0450  04/01/20  0636   WBC  --   --  4.3  --  4.0  --  3.8*   < > 4.5   HGB 7.0* 7.7* 7.2*   < > 7.5*   < > 7.2*  Canceled, Test credited   < > 7.3*   MCV  --   --  82  --  83  --  82   < > 79   PLT  --   --  148*  --  126*  --  121*   < > 129*   INR  --  1.34*  --   --   --   --   --   --  2.29*   NA  --   --  138  --  135  --  132*   < > 135   POTASSIUM  --   --  3.8   --  3.7  --  3.7   < > 3.8   CHLORIDE  --   --  107  --  103  --  102   < > 103   CO2  --   --  25  --  28  --  26   < > 25   BUN  --   --  25  --  28  --  30   < > 35*   CR  --   --  1.47*  --  1.64*  --  1.84*   < > 1.69*   ANIONGAP  --   --  6  --  4  --  4   < > 7   BENNIE  --   --  8.1*  --  8.0*  --  8.0*   < > 8.4*   GLC  --   --  93  --  113*  --  91   < > 89   ALBUMIN  --   --   --   --   --   --   --   --  2.4*   PROTTOTAL  --   --   --   --   --   --   --   --  6.0*   BILITOTAL  --   --   --   --   --   --   --   --  1.4*   ALKPHOS  --   --   --   --   --   --   --   --  67   ALT  --   --   --   --   --   --   --   --  11   AST  --   --   --   --   --   --   --   --  25    < > = values in this interval not displayed.       No results found for this or any previous visit (from the past 24 hour(s)).

## 2020-04-07 NOTE — PROGRESS NOTES
Called Antonio's wife Helena and left VM updating her that his CORE return appt is rescheduled d/t ongoing hospitalization.  Requested she have list of Antonio's medications, VS and weights post hospitalization for this tele visit.     Future Appointments   Date Time Provider Department Center   4/13/2020 10:50 AM Sarah, Juli Day PA-C [facility]   4/20/2020 11:00 AM [facility] MD [facility]       Juli Guillaume, RN BSN  [place], [place]  [facility] Care Coordinator  04/07/20, 2:59 PM

## 2020-04-07 NOTE — PROGRESS NOTES
GI follow up    Unfortunately we are still waiting for pill cam results. I have requested a stat read on this and hope to have results later this am or early pm. Stable hgb noted.       Katherin Boyd MD  [facility]  Pager [phone] until 5 pm  Office [phone] for after hours on call provider

## 2020-04-07 NOTE — PROGRESS NOTES
"CLINICAL NUTRITION SERVICES  -  ASSESSMENT NOTE    Recommendations Ordered by Registered Dietitian (RD):   Add Boost Plus BID between meals (pt has received these in the past)  Thera vit M daily    Malnutrition: (4/7)  % Weight Loss:  > 7.5% in 3 months (severe malnutrition)  % Intake:  </= 50% for >/= 5 days (severe malnutrition) - on average  Subcutaneous Fat Loss:  Unable  Muscle Loss:  Unable  Fluid Retention:  Moderate    Malnutrition Diagnosis: Severe malnutrition  In Context of:  Acute illness or injury  Chronic illness or disease     REASON FOR ASSESSMENT  Antonio Ramirez is a 75 year old male seen by Registered Dietitian for LOS    NUTRITION HISTORY  - Information obtained from EMR.    From EMR:  - Admitted for acute on chronic anemia due to rectal bleeding.   - PMH: alcohol cirrhosis w/ persistent ascites, paracentesis every 2 weeks, CAD s/p CABG x3, EF of 40-45%, chronic systolic HF, pulmonary HTN, BKA, among others.   - Ongoing etoh misuse w/o desire to quit.   - suspect baseline poor appetite given degree of persistent ascites.     Per Diet Hx taken on 3/1 of 2019:   \"He states he only eats one meal/day (dinner) but unable to tell me a typical meal.  Drinks Ensure, 1-2 servings/day, plus Pepsi. He doesn't usually snack on anything.  Per H&P, hx ETOH, he reported that he drinks 3-4 vodka dinks/day.  Per chart review, we had seen pt during admit last November and instructed him on high-protein diet for skin health.\"    CURRENT NUTRITION ORDERS  Diet Order:     2000 mg Sodium (since 4/3)   NPO/CLD from 3/31-4/2    Current Intake/Tolerance:  Flowsheet documents anywhere from 0-100% intake for meals.   Per Meal Review:   4/6 - 1471 kcal, 41 g pro ordered (75%, 100% 2 meals)  4/5 - 1319 kcal, 48 g pro ordered (100% for one meal)  4/4 - 1617 kcal, 32 g pro ordered (25% for one meal)  4/3 - 680 kcal, 8 g pro (0% for one meal, 100% for one meal)    NUTRITION FOCUSED PHYSICAL ASSESSMENT FOR DIAGNOSING " "MALNUTRITION)  No:  Unable - Current departmental policy during COVID-19 Pandemic         Observed:    Unable    Obtained from Chart/Interdisciplinary Team:  Per GI - Colonoscopy 4/1 showed red blood throughout, suspicious for small bowel source.  Awaiting results of pill cam study  4/6 - Paracentesis    2+ Edema  Last BM 4/6 - red/dark, soft/liquid.    ANTHROPOMETRICS  Height: 6' 2\"  Weight: 100.7 kg (222 lb)   Body mass index is 28.5 kg/m .  Weight Status:  Overweight BMI 25-29.9  IBW: 81.3 kg - adj for BKA  % IBW: 124%  Weight History: since the beginning of the year pt has lost up to 28#. (11.2%).   Wt Readings from Last 10 Encounters:   04/07/20 102.6 kg (226 lb 3.1 oz)   03/11/20 103.8 kg (228 lb 13.4 oz)   03/09/20 108.7 kg (239 lb 11.2 oz)   02/28/20 108.9 kg (240 lb)   02/13/20 108.9 kg (240 lb)   01/28/20 109.1 kg (240 lb 8 oz)   01/13/20 112.9 kg (249 lb)   01/08/20 114.7 kg (252 lb 13.9 oz)   01/06/20 113.4 kg (250 lb)   12/16/19 104.3 kg (230 lb)     LABS  Labs reviewed  Cr 1.47, BUN 25    MEDICATIONS  Medications reviewed  Appears Folic Acid, Thiamine, and Thera vit M were ordered x4 days, then discontinued.     ASSESSED NUTRITION NEEDS PER APPROVED PRACTICE GUIDELINES:  Dosing Weight 86 kg - adjusted for overweight  Estimated Energy Needs: 6980-0932 kcals (25-30 Kcal/Kg)  Justification: maintenance  Estimated Protein Needs: 103-129 grams protein (1.2-1.5 g pro/Kg)  Justification: preservation of lean body mass  Estimated Fluid Needs: 1 mL/kcal  Justification: maintenance    MALNUTRITION:  % Weight Loss:  > 7.5% in 3 months (severe malnutrition)  % Intake:  </= 50% for >/= 5 days (severe malnutrition) - on average  Subcutaneous Fat Loss:  Unable  Muscle Loss:  Unable  Fluid Retention:  Moderate    Malnutrition Diagnosis: Severe malnutrition  In Context of:  Acute illness or injury  Chronic illness or disease    NUTRITION DIAGNOSIS:  Inadequate oral intake related to altered GI function with GIB as " evidenced by intakes meeting <50% est needs on average x7 day admission, with up to an 11% weight loss in 3 months.       NUTRITION INTERVENTIONS  Recommendations / Nutrition Prescription  Diet per MD (2g Na)    Add Boost Plus BID between meals (pt has received these in the past)  Thera vit M daily     Implementation  Nutrition education: Per Provider order if indicated   Medical Food Supplement and Multivitamin/Mineral: as above       Nutrition Goals  Intake of at least 75% meals TID.     MONITORING AND EVALUATION:  Progress towards goals will be monitored and evaluated per protocol and Practice Guidelines    Narda Armijo RD, LD  [facility], 64, 53, [facility]   Pager: [phone]  Weekend Pager: [phone]

## 2020-04-07 NOTE — PLAN OF CARE
"DATE & TIME: 04/07/20 7-3 pm    Cognitive Concerns/ Orientation : A & O x3-4. Forgetful. Irritable/labile mood.  ABNL VS/O2: VSS on RA.   MOBILITY: A1-2 w/ gait belt and walker. Pt refusing to get out of bed, offered multiple times.   PAIN MANAGMENT: Denies  DIET: 2g NA Sodium  BOWEL/BLADDER: BM last night, using urinal.   ABNL LAB/BG: Cr 1.47 yesterday and Hgb 7.0 today.   DRAIN/DEVICES: PIV SL  TELEMETRY RHYTHM: A-fib CVR.  SKIN: Pale, noticeable bruising on bi-lateral arms. Generalized edema +1-2, moderate edema to lower extremities. Refusing full skin assessment.   TESTS/PROCEDURES: Plan for enteroscopy tomorrow at about 1 pm, NPO @ midnight.   D/C DAY/GOALS/PLACE: Pending  OTHER IMPORTANT INFO: On pulsate mattress, refusing repositioning or any movement out of bed \"unless he has too\".   "

## 2020-04-08 NOTE — ANESTHESIA POSTPROCEDURE EVALUATION
Patient: Antonio Ramirez    Procedure(s):  ENTEROSCOPY    Diagnosis:Anemia, unspecified type [D64.9]  Diagnosis Additional Information: No value filed.    Anesthesia Type:  MAC    Note:  Anesthesia Post Evaluation    Patient location during evaluation: PACU  Patient participation: Able to fully participate in evaluation  Level of consciousness: awake and alert  Pain management: adequate  Airway patency: patent  Cardiovascular status: acceptable and hemodynamically stable  Respiratory status: acceptable and nasal cannula  Hydration status: euvolemic  PONV: none     Anesthetic complications: None          Last vitals:  Vitals:    04/08/20 1650 04/08/20 1700 04/08/20 1730   BP: 121/61 118/70 107/61   Pulse: 67 66    Resp: 14 11    Temp: 36.7  C (98  F)  36.3  C (97.3  F)   SpO2: 99% 96%          Electronically Signed By: James Gamble MD  April 8, 2020  6:12 PM

## 2020-04-08 NOTE — PLAN OF CARE
DATE & TIME: 4/6/20 2136-7896  Cognitive Concerns/ Orientation : A&Ox4, pt is irritable and frustrated with overall plan.  BEHAVIOR & AGGRESSION TOOL COLOR: Green-yellow (restless, irritable)  CIWA SCORE: protocol discontinued today.   ABNL VS/O2: VSS on RA  MOBILITY: up with 1, walker/gb with left prosthesis. Pt refusing turn/repo  PAIN MANAGMENT: denies pain.   DIET: 2gm NA  BOWEL/BLADDER: Continent, uses urinal/BSC. Pt had maroon stool this evening.   ABNL LAB/BG: Hgb 7.0, down from 7.7 on 4/6  DRAIN/DEVICES: PIV SL   TELEMETRY RHYTHM: Afib CVR  SKIN: Pt not cooperative with assessment. Red, willis RLE. Bruised, scabbed.   TESTS/PROCEDURES: Plan for Enteroscopy tomorrow   D/C DAY/GOALS/PLACE: Pending procedure/Hgb stabilization.   OTHER IMPORTANT INFO: On pulsate mattress. Pt refusing to get out of bed.

## 2020-04-08 NOTE — PROVIDER NOTIFICATION
Critical lab value received from lab:    Hgb 6.4    Bedside SHIMON Regalado notified, states she will notify provider.

## 2020-04-08 NOTE — CONSULTS
Consultation    Antonio Ramirez MRN# [id number]   YOB: 1945 Age: 75 year old   Date of Admission: 3/31/2020  Requesting physician: Dr. Burgess  Reason for consult: APLA on anticoagulation with new GI bleeding. Question on possible resumption of anticoagulation           Assessment and Plan:     Sage is a 75 year old admitted 3/31 with rectal bleeding    APLA with history of saddle PE  - Followed by Dr. Raymond, but last seen 10/2018  - History of saddle PE 2007 with placement of IVC filter  - History of SDH s/p evacuation in 4/2018  - Has been on low dose rivaroxaban  - Now admitted with GI bleeding and off of DOAC therapy  - Discussed with Dr. Raymond this morning regarding the question of when/if able to resume anticoagulation  - Plan to repeat APLA studies including beta-2 glycoprotein, anti-cardiolipin antibodies and lupus inhibitors. If negative, then we can safely keep him off anticoagulation. If positive and source of bleeding can be identified and controlled, we can try to carefully resume low dose rivaroxaban. If bleeding site not discovered or controllable, then the risks of anticoagulation are likely too great and he will need to be off anticoagulation.   - I have reviewed the recommendations with Dr. Burgess    Chronic anemia, acute exacerbation due to GI bleeding  - Has chronic anemia likely from liver disease, previous bleeding and underlying low grade lymphoproliferative disorder  - DOAC therapy has been discontinued this admission with acute bleeding  - Transfusions have been given, he is getting 1 unit PRBC 4/8  - GI consulted and extensive work up in process. Planning enteroscopy today.  - With underlying history of lymphoproliferative disorder will also round out evaluation to evaluate for hemolysis  - Most recent iron studies 3/2020. This could be repeated. Iron could be given IV to replace losses from GI bleeding.  - Continue close monitoring of blood counts  - Transfuse for  hemoglobin < 6.0 (with attempt judicious use with low supply given COVID 19 pandemic), active bleeding or symptomatic anemia.    Elevated INR  - Likely from underlying liver disease  - 10 mg vitamin K given 3/31  - INR has improved, but not normalized. Again this is likely a fixed process due to underlying liver disease.     Time spent:  40 minutes was spent in reviewing the chart and discussing case with Sabine ROBINS and Dr. Burgess. 10 minutes was spent on the phone discussing the current issues with Sage and reviewing recommendations from Dr. Raymond. We will continue to follow Sage by making regular phone visits due to the current COVID 19 outbreak. Please feel free to call with questions or concerns.     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 He was started on anticoagulation with rivaroxaban. There have been interruptions in treatment, most recently due to SDH and need for evacuation. This occurred in April 2018. Sage has been followed by Dr. Raymond and was last seen in 10/2018.    More recently Saeg started to have rectal bleeding. This prompted admission 3/31/2020. His admission to date has been reviewed in the electronic records and in conversation with Sage. GI has been consulted and multiple procedures have been completed without clear source for bleeding. There are plans for an enteroscopy today. Sage is getting another unit of PRBCs now. He continues to have black stools and denies other bleeding. Sage does not feel symptomatic from his anemia. His rivaroxaban was put on hold as of 4/4/2020.    Hematology has been consulted to address the long term use of anticoagulation in this complicated situation.    HEMATOLOGY HISTORY:  He has a  history of IgG antiphospholipid antibody syndrome associated with lupus anticoagulant and prior catastrophic antiphospholipid antibody syndrome. The patient had been followed for a number of years on low-molecular-weight heparins and he was changed to warfarin and later changed to Xarelto by his physicians in [place].    The patient also has a history of a low-grade lymphoproliferative disorder involving the bone marrow diagnosed in 2012. The bone marrow biopsy had 5% involvement of a monoclonal CD20-positive lymphoproliferative population. Please see bone marrow biopsy report from 2012 [facility].         Physical Exam:   Vitals were reviewed  Blood pressure 100/55, pulse 71, temperature 98  F (36.7  C), temperature source Oral, resp. rate 18, weight 102.6 kg (226 lb 3.1 oz), SpO2 95 %.  Temperatures:  Current - Temp: 98  F (36.7  C); Max - Temp  Av.1  F (36.7  C)  Min: 97.7  F (36.5  C)  Max: 98.8  F (37.1  C)  Respiration range: Resp  Av  Min: 16  Max: 18  Pulse range: Pulse  Av  Min: 71  Max: 71  Blood pressure range: Systolic (24hrs), Av , Min:100 , Max:123   ; Diastolic (24hrs), Av, Min:55, Max:60    Pulse oximetry range: SpO2  Av %  Min: 94 %  Max: 98 %    Intake/Output Summary (Last 24 hours) at 2020 1107  Last data filed at 2020 0600  Gross per 24 hour   Intake 920 ml   Output 1000 ml   Net -80 ml     No exam. This was a phone consultation.              Past Medical History:   I have reviewed this patient's past medical history  Past Medical History:   Diagnosis Date     Alcoholism (H)      Amputated left leg (H)      Anemia      Anticardiolipin antibody syndrome (H)      Antiphospholipid antibody syndrome (H)      Antiplatelet or antithrombotic long-term use      Aortic root dilatation (H)      Aortic stenosis      Arthritis      Balance problem      Coronary artery disease     CABG 2007: SVG to LAD, SVG to diagonal, SVG to OM; cardiac cath  5/17/18: thrombectomy for restenosis of stents-sent for urgent CABG, cath 5/14/2007: GRECIA x2 to LAD, Grecia to diagonal     Gastro-oesophageal reflux disease      Heart attack (H) 2007    apparently arrested, cardiac X5     Hiatal hernia      Hypercholesterolemia      Hypertension      Ischemic cardiomyopathy      Left foot drop      Liver disease     alcoholic liver disease, alcoholic cirrohsosis, portal hypertension     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)     ?When diagnosed, patient not aware of this     Moderate mitral regurgitation      Monoclonal gammopathy      Neuropathy      Noninfectious ileitis     diverticulitis of colon     Nonrheumatic tricuspid valve regurgitation      Paroxysmal atrial fibrillation (H)      PE (pulmonary embolism) 2006    saddle emboli 2006     Prostate cancer (H) 2000    Treated with radiation (seed implants in 2000) -- no problems since     Pulmonary hypertension (H)      Renal disease     kidney stones     Syncope      Thrombocytopenia (H)      Urethral stricture      Venous thrombosis     IVC filter and lysis procedures 2007             Past Surgical History:   I have reviewed this patient's past surgical history  Past Surgical History:   Procedure Laterality Date     AMPUTATE LEG BELOW KNEE Left 04/2014    related to gangrene, started as foot ulcer related to neuropathy     AMPUTATE LEG BELOW KNEE Left 12/4/2017    Procedure: AMPUTATE LEG BELOW KNEE;  Exploration of Left Leg Below Knee Amputation Stump with Ligation of Bleeders.;  Surgeon: Leandro Arreola MD;  Location:  OR     APPENDECTOMY       APPLY WOUND VAC Left 12/6/2017    Procedure: APPLY WOUND VAC;;  Surgeon: Saima Howard MD;  Location:  SD     ARTHROPLASTY HIP Right 11/27/2018    Procedure: RIGHT TOTAL HIP ARTHROPLASTY;  Surgeon: Saima Howard MD;  Location:  OR     ARTHROPLASTY KNEE Right 9/19/2014    Procedure: ARTHROPLASTY KNEE;  Surgeon: Saima Howard MD;  Location:  OR      CARDIAC SURGERY      CABG     CHOLECYSTECTOMY       COLONOSCOPY       COLONOSCOPY N/A 4/1/2020    Procedure: COLONOSCOPY;  Surgeon: Emeka Freeman DO;  Location:  GI     COMBINED CYSTOSCOPY, RETROGRADES, EXCHANGE STENT URETER(S) Left 7/24/2018    Procedure: COMBINED CYSTOSCOPY, RETROGRADES, EXCHANGE STENT URETER(S);  CYSTOSCOPY, BILATERAL RETROGRADES, BILATERAL URETERAL STENT EXCHANGE ;  Surgeon: Matt Cervantes MD;  Location:  OR     CRANIOTOMY Right 4/7/2018    Procedure: CRANIOTOMY;  Right Craniotomy For Subdural Hematoma;  Surgeon: Jono Issa MD;  Location:  OR     CYSTOSCOPY, DILATE URETHRA, COMBINED N/A 10/12/2016    Procedure: COMBINED CYSTOSCOPY, DILATE URETHRA;  Surgeon: Dimitry Bello MD;  Location:  OR     CYSTOSCOPY, REMOVE STENT(S), COMBINED Right 7/24/2018    Procedure: COMBINED CYSTOSCOPY, REMOVE STENT(S);;  Surgeon: Jose Hunter MD;  Location:  OR     ENDOSCOPIC STRIPPING VEIN(S)       esophagogastroduodenoscopy       ESOPHAGOSCOPY, GASTROSCOPY, DUODENOSCOPY (EGD), COMBINED N/A 3/11/2019    Procedure: COMBINED ESOPHAGOSCOPY, GASTROSCOPY, DUODENOSCOPY (EGD), BIOPSY SINGLE OR MULTIPLE;  Surgeon: Katherin Boyd MD;  Location: [facility]     ESOPHAGOSCOPY, GASTROSCOPY, DUODENOSCOPY (EGD), COMBINED N/A 9/27/2019    Procedure: ESOPHAGOGASTRODUODENOSCOPY, WITH FOREIGN BODY REMOVAL;  Surgeon: Raegan Mckeon MD;  Location: [facility]     ESOPHAGOSCOPY, GASTROSCOPY, DUODENOSCOPY (EGD), COMBINED N/A 4/1/2020    Procedure: ESOPHAGOGASTRODUODENOSCOPY (EGD);  Surgeon: Emeka Freeman DO;  Location:  GI     EYE SURGERY      cataracts     GENITOURINARY SURGERY      Prostate Seen Implants     HERNIA REPAIR      Umbilical     INCISION AND DRAINAGE LOWER EXTREMITY, COMBINED Left 12/1/2017    Procedure: COMBINED INCISION AND DRAINAGE LOWER EXTREMITY;  INCISION AND DRAINAGE OF LEFT BELOW KNEE AMPUTATION ABSCESS, WOUND VAC PLACEMENT;  Surgeon: Saima Howard  MD NICHELLE;  Location:  OR     IR IVC FILTER PLACEMENT       IRRIGATION AND DEBRIDEMENT LOWER EXTREMITY, COMBINED Left 2017    Procedure: COMBINED IRRIGATION AND DEBRIDEMENT LOWER EXTREMITY;  IRRIGATION AND DEBRIDEMENT OF LEFT BELOW KNEE AMPUTATION SITE WITH WOUND VAC REPLACEMENT;  Surgeon: Saima Howard MD;  Location:  SD     IRRIGATION AND DEBRIDEMENT LOWER EXTREMITY, COMBINED Left 2017    Procedure: COMBINED IRRIGATION AND DEBRIDEMENT LOWER EXTREMITY;  IRRIGATION AND DEBRIDEMENT OF LEFT BELOW THE KNEE AMPUTATION AND SHORTENING OF THE TIBIA WITH WOUND CLOSURE;  Surgeon: Saima Howard MD;  Location:  OR     LASER HOLMIUM LITHOTRIPSY URETER(S), INSERT STENT, COMBINED Bilateral 2018    Procedure: COMBINED CYSTOSCOPY, URETEROSCOPY, LASER HOLMIUM LITHOTRIPSY URETER(S), INSERT STENT;  CYSTOSCOPY, BILATERAL URETEROSCOPY,  HOLMIUM LASER LITHOTRIPSY, BILATERAL STENT PLACEMENT, STONE BASKETING;  Surgeon: Jose Hunter MD;  Location:  OR     LASER HOLMIUM LITHOTRIPSY URETER(S), INSERT STENT, COMBINED Left 2018    Procedure: COMBINED CYSTOSCOPY, URETEROSCOPY, LASER HOLMIUM LITHOTRIPSY URETER(S), INSERT STENT;  CYSTOSCOPY, LEFT URETEROSCOPY, LEFT LASER HOLMIUM LITHOTRIPSY URETER(S) REMOVAL BILATERAL STENTS;  Surgeon: Jose Hunter MD;  Location:  OR     ORTHOPEDIC SURGERY      (R) knee scope     ORTHOPEDIC SURGERY      total hip      ORTHOPEDIC SURGERY      elbow surgery               Social History:   I have reviewed this patient's social history  Social History     Tobacco Use     Smoking status: Never Smoker     Smokeless tobacco: Never Used   Substance Use Topics     Alcohol use: Yes     Comment: quit 10/2015; now abt 1-3 vodka's per night             Family History:   I have reviewed this patient's family history  Family History   Problem Relation Age of Onset     Lung Cancer Mother      Heart Failure Father          age 87             Allergies:     Allergies   Allergen  "Reactions     Kdc:Minocycline+[place] [facility] GI Disturbance     11/01/16-PT IS NOT AWARE OF THIS REACTION     Ciprofloxacin Itching     Severe itching \"like ants were crawling\"     Hmg-Coa-R Inhibitors Other (See Comments)     Rhabdomyolysis occurred within a couple days  Rhabdomyolysis             Medications:   I have reviewed this patient's current medications  Medications Prior to Admission   Medication Sig Dispense Refill Last Dose     albuterol (PROAIR HFA/PROVENTIL HFA/VENTOLIN HFA) 108 (90 Base) MCG/ACT inhaler Inhale 2 puffs into the lungs every 6 hours as needed    Past Week at Unknown time     aspirin 81 MG EC tablet Take 81 mg by mouth every evening    3/30/2020 at Unknown time     gabapentin (NEURONTIN) 600 MG tablet Take 600 mg by mouth 2 times daily   3/31/2020 at Unknown time     rivaroxaban ANTICOAGULANT (XARELTO) 20 MG TABS tablet Take 20 mg by mouth At Bedtime   3/30/2020 at Unknown time     spironolactone (ALDACTONE) 50 MG tablet Take 1 tablet (50 mg) by mouth daily 90 tablet 3 Past Week at Unknown time     torsemide (DEMADEX) 20 MG tablet Take 2 tablets (40 mg) by mouth every morning 180 tablet 3 Past Week at Unknown time     rivaroxaban ANTICOAGULANT (XARELTO) 15 MG TABS tablet Take 1 tablet (15 mg) by mouth daily (with dinner)   not started     Current Facility-Administered Medications Ordered in Epic   Medication Dose Route Frequency Last Rate Last Dose     acetaminophen (TYLENOL) tablet 650 mg  650 mg Oral Q4H PRN         bisacodyl (DULCOLAX) Suppository 10 mg  10 mg Rectal Daily PRN         cefTRIAXone (ROCEPHIN) 2 g vial to attach to  ml bag for ADULTS or NS 50 ml bag for PEDS  2 g Intravenous Q24H   2 g at 04/07/20 1544     gabapentin (NEURONTIN) tablet 600 mg  600 mg Oral BID   600 mg at 04/08/20 0827     lidocaine (LMX4) cream   Topical Q1H PRN         lidocaine 1 % 0.1-1 mL  0.1-1 mL Other Q1H PRN         lidocaine 1 % 5 mL  5 mL Intradermal Once         May take regular AM " medications except those listed below.   Does not apply Continuous PRN         melatonin tablet 1 mg  1 mg Oral At Bedtime PRN         multivitamin w/minerals (THERA-VIT-M) tablet 1 tablet  1 tablet Oral Daily   1 tablet at 04/08/20 0827     naloxone (NARCAN) injection 0.1-0.4 mg  0.1-0.4 mg Intravenous Q2 Min PRN         ondansetron (ZOFRAN-ODT) ODT tab 4 mg  4 mg Oral Q6H PRN        Or     ondansetron (ZOFRAN) injection 4 mg  4 mg Intravenous Q6H PRN         polyethylene glycol (MIRALAX) Packet 17 g  17 g Oral Daily PRN         prochlorperazine (COMPAZINE) injection 5 mg  5 mg Intravenous Q6H PRN        Or     prochlorperazine (COMPAZINE) tablet 5 mg  5 mg Oral Q6H PRN        Or     prochlorperazine (COMPAZINE) Suppository 12.5 mg  12.5 mg Rectal Q12H PRN         sodium chloride (PF) 0.9% PF flush 3 mL  3 mL Intracatheter q1 min prn         sodium chloride (PF) 0.9% PF flush 3 mL  3 mL Intracatheter Q8H   3 mL at 04/07/20 2105     zolpidem (AMBIEN) half-tab 2.5 mg  2.5 mg Oral At Bedtime PRN   2.5 mg at 04/08/20 0057     No current [facility]-ordered outpatient medications on file.             Review of Systems:     The 10 point Review of Systems is negative other than noted in the HPI.            Data:   Data   Results for orders placed or performed during the hospital encounter of 03/31/20 (from the past 24 hour(s))   Hemoglobin   Result Value Ref Range    Hemoglobin 6.4 (LL) 13.3 - 17.7 g/dL   ABO/Rh type and screen   Result Value Ref Range    Units Ordered 1     ABO O     RH(D) Pos     Antibody Screen Pos (A)     Test Valid Only At [facility]        Specimen Expires 04/11/2020     Crossmatch Red Blood Cells     Blood Bank Comment       Delay in availability of Red Blood Cells called to  [facility] [address] AT 0910 BK

## 2020-04-08 NOTE — PROGRESS NOTES
"[facility]  Hospitalist Progress Note  Cathie Burgess MD  04/08/2020    Assessment & Plan   Antonio Ramirez is a 75 year old male admitted on 3/31/2020 with complicated medical hx as below on chronic anticoagulation who was admitted with acute on chronic anemia due to rectal bleeding.     1. Alcohol cirrhosis with persistent ascites, portal hypertensive gastropathy, and assumed coagulapathy.  Alcohol dependence.  Severe acute GI bleed  Has standing appointment for paracentesis every 2 weeks (last 3/23) since 2016. Consume 3-4 vodka drinks per day.  No desire to quit drinking at this point. INR 3.92 on adm and reversed with vitamin K.  -- continue to hold PTA spironolactone and torsemide with soft BP in setting rectal bleeding. Indicated sporatic use PTA.  -- discontinued CIWA   -- unclear etiology, see discussion below  --continue to transfuse for hemoglobin < 7 g/dl  -- appreciate GI follow-up, I discussed with Dr. Boyd  -- had pill cam 4/3 with result still pending  -- discontinued xarelto, asa on hold  -- per Dr. Miller, [facility], \"If severe/life-threatening bleeding, will need to consider IR/angio\"  --Plan is enteroscopy today it is possible there are blood vessels to cauterize or clip    2. Acute on chronic anemia and chronic thrombocytopenia.   -- Chronic component related to ongoing alcohol use, cirrhosis, and CKD. Baseline hgb around 7.  -- 7 to 10 day history of rectal bleeding. Hgb 5.4 on adm with 5 units of PRBC transfusion.  -- EGD did not show source of bleeding. Colonoscopy with red blood in terminal ileum throughout entire examined colon, one 10 mm polyp in the ascending colon (not removed), sigmoid diverticulosis, a non-bleeding rectal varix, and non-bleeding external hemorrhoids. Tagged RBC scan suggested bleed in duodenum which was felt inaccurate as EGD showed normal duodenum.  -- [facility] following, off octreotide, had PillCam study   -- changed to po protonix  --transfused 1 unit " of PRBC on 4/4  --Cont hold ASA 81mg daily.  --IV ceftriaxone 2 gram daily for SBP prophylaxis.  Pharm.D. recommends continuing parenteral antibiotics since procedure is planned for today  --continue to follow hgb  --Heme/Onc consult requested, I discussed with Patricio Jones and appreciate his evaluation and recommendations  --They recommend repeating A STEFANY studies including beta-2 glycoprotein, anticardiolipin antibodies, lupus inhibitors.  If these are negative, he could safely remain off anticoagulation.  If positive, and a source for bleeding can be identified and corrected, they recommend attempting to resume low-dose rivaroxaban. If bleeding site not discovered or controllable, then the risks of anticoagulation are likely too great and he will need to be off anticoagulation.      3. CAD s/p 3v-CABG.  Ischemic cardiomyopathy with HFrEF.  Severe pulmonary hypertension.  Valvular disorder. Mild to moderate mitral regurgitation, mild mitral stenosis, mild tricuspid regurgitation, and aortic stenosis.  -- denies any chest pain or sob  -- holding asa    4. HTN, DLD.  -- not on any bp medications  -- just diuretics    5. Hx PEA arrest and VT arrest 6/2019.  Noncompliant in review of most recent cardiology note.  --Tele, I&Os, and daily weights.  --PTA ASA and diuretics on hold as above.     6. Persistent atrial fibrillation.  Patient stated that she goes in and out of atrial fibrillation.  He is compliant with daily Xarelto.  --discontinued Xarelto   --Hold aspirin as above.     7. Antiphospholipid antibody syndrome with hx PE s/p IVC filter (2007).  --discontinued Xarelto as above.  --Hold aspirin as above.  --See discussion above, I appreciate heme/onc recommendations    8. CKD, stage III. Baseline Cr 1.5, currently Cr 1.47     9. Chronic neuropathy. Gabapentin.       10. Left BKA.     11. Hx prostate CA and large cell B-cell non-Hodgkin's lymphoma:  S/p radioactive seeding of the prostate.  Appears to be in  "remission.    --No interventions necessary at this point.      12.  Severe malnutrition  Malnutrition: (4/7)   % Weight Loss:  > 7.5% in 3 months (severe malnutrition)   % Intake:  </= 50% for >/= 5 days (severe malnutrition) - on average   Subcutaneous Fat Loss:  Unable   Muscle Loss:  Unable   Fluid Retention:  Moderate       Malnutrition Diagnosis: Severe malnutrition      Diet: 2 gram Na diet  Levin Catheter: not present  DVT Prophylaxis: Pneumatic Compression Devices  Code Status: Full Code confirmed with patient 4/2.     Expected discharge: 1-2 days awaiting hgb stabilization, likely to home with Home PT    Cathie Burgess MD  Hospitalist  [facility]  Text Page (7am - 6pm, M-F)    Total time spent:  > 35 minutes  Time spent counseling, coordination of care: 25 minutes including discussion with care team and Heme/Onc NP, personal review and interpretation of labs and studies as noted above     Interval History   \"Maybe you should write a letter to [facility].\"  Patient says he is frustrated by long hospital stay.  His room is too cold, especially at night.  He feels he is not getting solutions to his issues.  Denies abdominal pain, no nausea, no respiratory complaints..    -Data reviewed today: I reviewed all new labs and imaging over the last 24 hours. I personally reviewed no images or EKG's today.    Physical Exam   Heart Rate: 61, Blood pressure 110/68, pulse 60, temperature 97.9  F (36.6  C), temperature source Oral, resp. rate 16, weight 102.6 kg (226 lb 3.1 oz), SpO2 96 %.  Vitals:    04/01/20 0609 04/04/20 0700 04/07/20 0500   Weight: 103 kg (227 lb 1.2 oz) 102.4 kg (225 lb 12.8 oz) 102.6 kg (226 lb 3.1 oz)     Vital Signs with Ranges  Temp:  [97.7  F (36.5  C)-98.8  F (37.1  C)] 97.9  F (36.6  C)  Pulse:  [60-71] 60  Heart Rate:  [60-71] 61  Resp:  [16-18] 16  BP: ()/(54-68) 110/68  SpO2:  [94 %-98 %] 96 %  I/O's Last 24 hours  I/O last 3 completed shifts:  In: 736.67 " [P.O.:420]  Out: 1150 [Urine:1150]    Constitutional: Awake, alert  Respiratory: Clear to auscultation bilaterally, no crackles or wheezing  Cardiovascular: Regular rate and rhythm, normal S1 and S2, + murmur noted  GI: Normal bowel sounds, soft, distended, firm and non-tender  Skin/Integumen: No rashes, no cyanosis, + edema, L BKA, brusing  Other: Mood is calm at the time of our interview    Medications   All medications were reviewed.    - MEDICATION INSTRUCTIONS -         [Auto Hold] cefTRIAXone  2 g Intravenous Q24H     [Auto Hold] gabapentin  600 mg Oral BID     lidocaine (buffered or not buffered)  5 mL Intradermal Once     [Auto Hold] multivitamin w/minerals  1 tablet Oral Daily     sodium chloride (PF)  3 mL Intracatheter Q8H        Data   Recent Labs   Lab 04/08/20  0756 04/07/20  1105 04/06/20  1742 04/06/20  0744  04/05/20  0654  04/04/20  0450   WBC  --   --   --  4.3  --  4.0  --  3.8*   HGB 6.4* 7.0* 7.7* 7.2*   < > 7.5*   < > 7.2*  Canceled, Test credited   MCV  --   --   --  82  --  83  --  82   PLT  --   --   --  148*  --  126*  --  121*   INR  --   --  1.34*  --   --   --   --   --    NA  --   --   --  138  --  135  --  132*   POTASSIUM  --   --   --  3.8  --  3.7  --  3.7   CHLORIDE  --   --   --  107  --  103  --  102   CO2  --   --   --  25  --  28  --  26   BUN  --   --   --  25  --  28  --  30   CR  --   --   --  1.47*  --  1.64*  --  1.84*   ANIONGAP  --   --   --  6  --  4  --  4   BENNIE  --   --   --  8.1*  --  8.0*  --  8.0*   GLC  --   --   --  93  --  113*  --  91    < > = values in this interval not displayed.       No results found for this or any previous visit (from the past 24 hour(s)).

## 2020-04-08 NOTE — PROVIDER NOTIFICATION
Paged Dr. Burgess regarding critical type & screen received from blood bank. Pt + for antibody. 1 unit of PRBC will be a delay due to the new results. Additionally, explained how pt is very unhappy & frustrated w/ care being received.   --- Dr. Burgess aware & will come consult w/ pt.         Paged Dr. Boyd of critical result Hgb 6.4 to see if they want to proceed with enteroscopy today.   --- Okay to proceed with endo after transfusion. Will call Endo after transfusion is complete.

## 2020-04-08 NOTE — ANESTHESIA CARE TRANSFER NOTE
Patient: Antonio Ramirez    Procedure(s):  ENTEROSCOPY    Diagnosis: Anemia, unspecified type [D64.9]  Diagnosis Additional Information: No value filed.    Anesthesia Type:   MAC     Note:  Airway :Nasal Cannula  Patient transferred to:PACU  Comments: At end of procedure, spontaneous respirations, patient alert to voice, able to follow commands. Oxygen via nasal cannula at 2 liters per minute to PACU. Oxygen tubing connected to wall O2 in PACU, SpO2, NiBP, and EKG monitors and alarms on and functioning, Ez Hugger warmer connected to patient gown, report on patient's clinical status given to PACU RN, RN questions answered.  Handoff Report: Identifed the Patient, Identified the Reponsible Provider, Reviewed the pertinent medical history, Discussed the surgical course, Reviewed Intra-OP anesthesia mangement and issues during anesthesia, Set expectations for post-procedure period and Allowed opportunity for questions and acknowledgement of understanding      Vitals: (Last set prior to Anesthesia Care Transfer)    CRNA VITALS  4/8/2020 1540 - 4/8/2020 1615      4/8/2020             Pulse:  80    SpO2:  100 %    Resp Rate (set):  18  105/64  36.8                Electronically Signed By: DIPAK Ignacio CRNA  April 8, 2020  4:15 PM

## 2020-04-08 NOTE — PLAN OF CARE
A&O- frustrated & uncooperative at times VSS. Adequate urine output. Refuses reposition, ambulates assist x1. NPO. Denies pain. Hgb 6.4- transfused 1 unit PRBC. Pt sent to pre-op for enterscopy.

## 2020-04-08 NOTE — PLAN OF CARE
A+O VSS on room air. Pt refused all physical assessments stating he would notify us if something was wrong. Ambien given for sleep.

## 2020-04-08 NOTE — ANESTHESIA PREPROCEDURE EVALUATION
Anesthesia Pre-Procedure Evaluation    Patient: Antonio Ramirez   MRN: [id number] : 1945          Preoperative Diagnosis: Anemia, unspecified type [D64.9]    Procedure(s):  ENTEROSCOPY    Past Medical History:   Diagnosis Date     Alcoholism (H)      Amputated left leg (H)      Anemia      Anticardiolipin antibody syndrome (H)      Antiphospholipid antibody syndrome (H)      Antiplatelet or antithrombotic long-term use      Aortic root dilatation (H)      Aortic stenosis      Arthritis      Balance problem      Coronary artery disease     CABG 2007: SVG to LAD, SVG to diagonal, SVG to OM; cardiac cath 18: thrombectomy for restenosis of stents-sent for urgent CABG, cath 2007: GRECIA x2 to LAD, Grecia to diagonal     Gastro-oesophageal reflux disease      Heart attack (H)     apparently arrested, cardiac X5     Hiatal hernia      Hypercholesterolemia      Hypertension      Ischemic cardiomyopathy      Left foot drop      Liver disease     alcoholic liver disease, alcoholic cirrohsosis, portal hypertension     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)     ?When diagnosed, patient not aware of this     Moderate mitral regurgitation      Monoclonal gammopathy      Neuropathy      Noninfectious ileitis     diverticulitis of colon     Nonrheumatic tricuspid valve regurgitation      Paroxysmal atrial fibrillation (H)      PE (pulmonary embolism) 2006    saddle emboli 2006     Prostate cancer (H)     Treated with radiation (seed implants in ) -- no problems since     Pulmonary hypertension (H)      Renal disease     kidney stones     Syncope      Thrombocytopenia (H)      Urethral stricture      Venous thrombosis     IVC filter and lysis procedures      Past Surgical History:   Procedure Laterality Date     AMPUTATE LEG BELOW KNEE Left 2014    related to gangrene, started as foot ulcer related to neuropathy     AMPUTATE LEG BELOW KNEE Left 2017    Procedure: AMPUTATE LEG BELOW  KNEE;  Exploration of Left Leg Below Knee Amputation Stump with Ligation of Bleeders.;  Surgeon: Leandro Arreola MD;  Location:  OR     APPENDECTOMY       APPLY WOUND VAC Left 12/6/2017    Procedure: APPLY WOUND VAC;;  Surgeon: Saima Howard MD;  Location:  SD     ARTHROPLASTY HIP Right 11/27/2018    Procedure: RIGHT TOTAL HIP ARTHROPLASTY;  Surgeon: Saima Howard MD;  Location:  OR     ARTHROPLASTY KNEE Right 9/19/2014    Procedure: ARTHROPLASTY KNEE;  Surgeon: Saima Howard MD;  Location:  OR     CARDIAC SURGERY      CABG     CHOLECYSTECTOMY       COLONOSCOPY       COLONOSCOPY N/A 4/1/2020    Procedure: COLONOSCOPY;  Surgeon: Emeka Freeman DO;  Location:  GI     COMBINED CYSTOSCOPY, RETROGRADES, EXCHANGE STENT URETER(S) Left 7/24/2018    Procedure: COMBINED CYSTOSCOPY, RETROGRADES, EXCHANGE STENT URETER(S);  CYSTOSCOPY, BILATERAL RETROGRADES, BILATERAL URETERAL STENT EXCHANGE ;  Surgeon: Matt Cervantes MD;  Location:  OR     CRANIOTOMY Right 4/7/2018    Procedure: CRANIOTOMY;  Right Craniotomy For Subdural Hematoma;  Surgeon: Jono Issa MD;  Location:  OR     CYSTOSCOPY, DILATE URETHRA, COMBINED N/A 10/12/2016    Procedure: COMBINED CYSTOSCOPY, DILATE URETHRA;  Surgeon: Dimitry Bello MD;  Location:  OR     CYSTOSCOPY, REMOVE STENT(S), COMBINED Right 7/24/2018    Procedure: COMBINED CYSTOSCOPY, REMOVE STENT(S);;  Surgeon: Jose Hunter MD;  Location:  OR     ENDOSCOPIC STRIPPING VEIN(S)       esophagogastroduodenoscopy       ESOPHAGOSCOPY, GASTROSCOPY, DUODENOSCOPY (EGD), COMBINED N/A 3/11/2019    Procedure: COMBINED ESOPHAGOSCOPY, GASTROSCOPY, DUODENOSCOPY (EGD), BIOPSY SINGLE OR MULTIPLE;  Surgeon: Katherin Boyd MD;  Location:  GI     ESOPHAGOSCOPY, GASTROSCOPY, DUODENOSCOPY (EGD), COMBINED N/A 9/27/2019    Procedure: ESOPHAGOGASTRODUODENOSCOPY, WITH FOREIGN BODY REMOVAL;  Surgeon: Raegan Mckeon MD;  Location:   GI     ESOPHAGOSCOPY, GASTROSCOPY, DUODENOSCOPY (EGD), COMBINED N/A 4/1/2020    Procedure: ESOPHAGOGASTRODUODENOSCOPY (EGD);  Surgeon: Emeka Freeman DO;  Location:  GI     EYE SURGERY      cataracts     GENITOURINARY SURGERY      Prostate Seen Implants     HERNIA REPAIR      Umbilical     INCISION AND DRAINAGE LOWER EXTREMITY, COMBINED Left 12/1/2017    Procedure: COMBINED INCISION AND DRAINAGE LOWER EXTREMITY;  INCISION AND DRAINAGE OF LEFT BELOW KNEE AMPUTATION ABSCESS, WOUND VAC PLACEMENT;  Surgeon: Saima Howard MD;  Location:  OR     IR IVC FILTER PLACEMENT       IRRIGATION AND DEBRIDEMENT LOWER EXTREMITY, COMBINED Left 12/6/2017    Procedure: COMBINED IRRIGATION AND DEBRIDEMENT LOWER EXTREMITY;  IRRIGATION AND DEBRIDEMENT OF LEFT BELOW KNEE AMPUTATION SITE WITH WOUND VAC REPLACEMENT;  Surgeon: Saima Howard MD;  Location:  SD     IRRIGATION AND DEBRIDEMENT LOWER EXTREMITY, COMBINED Left 12/8/2017    Procedure: COMBINED IRRIGATION AND DEBRIDEMENT LOWER EXTREMITY;  IRRIGATION AND DEBRIDEMENT OF LEFT BELOW THE KNEE AMPUTATION AND SHORTENING OF THE TIBIA WITH WOUND CLOSURE;  Surgeon: Saima Howard MD;  Location:  OR     LASER HOLMIUM LITHOTRIPSY URETER(S), INSERT STENT, COMBINED Bilateral 6/28/2018    Procedure: COMBINED CYSTOSCOPY, URETEROSCOPY, LASER HOLMIUM LITHOTRIPSY URETER(S), INSERT STENT;  CYSTOSCOPY, BILATERAL URETEROSCOPY,  HOLMIUM LASER LITHOTRIPSY, BILATERAL STENT PLACEMENT, STONE BASKETING;  Surgeon: Jose Hunter MD;  Location:  OR     LASER HOLMIUM LITHOTRIPSY URETER(S), INSERT STENT, COMBINED Left 8/14/2018    Procedure: COMBINED CYSTOSCOPY, URETEROSCOPY, LASER HOLMIUM LITHOTRIPSY URETER(S), INSERT STENT;  CYSTOSCOPY, LEFT URETEROSCOPY, LEFT LASER HOLMIUM LITHOTRIPSY URETER(S) REMOVAL BILATERAL STENTS;  Surgeon: Jose Hunter MD;  Location:  OR     ORTHOPEDIC SURGERY      (R) knee scope     ORTHOPEDIC SURGERY      total hip      ORTHOPEDIC SURGERY       elbow surgery       Anesthesia Evaluation     . Pt has had prior anesthetic. Type: General (Glidescope in past)    No history of anesthetic complications          ROS/MED HX    ENT/Pulmonary:      (-) tobacco use, asthma, COPD and sleep apnea   Neurologic:     (+)neuropathy - L foot drop, other neuro  subdural hematoma s/p decompressive craniotomy - 4/2018   (-) seizures, CVA and TIA   Cardiovascular:     (+) Dyslipidemia, hypertension--CAD, -past MI,CABG-stent,2007 2 Drug Eluting Stent .. Taking blood thinners Pt has received instructions: Instructions Given to patient: Xarelto. CHF etiology: Ischemic Last EF: 40 date: 9/23/19 . fainting (syncope). :. dysrhythmias a-fib, valvular problems/murmurs type: AS and MR severe AS, mod MR, mod-severe TR:. pulmonary hypertension (Severe), Previous cardiac testing Echodate:9/23/19results:Left ventricular systolic function is moderately reduced. The visual ejection fraction is estimated at 40-45%. LVEF 41% based on biplane 2D tracing. There is severe distal anterior, mid-distal septal, and apical wall hypokinesis. Severe hypokinesis of the distal inferior wall. The right ventricle is moderately dilated. Mildly decreased right ventricular systolic function The left atrium is severely dilated. The right atrium is mild to moderately dilated. There is moderate mitral annular calcification. There is mild to moderate (1-2+) mitral regurgitation. There is mild mitral stenosis. The mean mitral valve gradient is 4mmHg at heart rate of 69 bpm. There is mild (1+) tricuspid regurgitation. Severe (>55mmHg) pulmonary hypertension is present. Flattened septum is consistent with RV pressure overload. There is sclerosis, calcification, and restriction of the aortic valve opening compatible with moderate aortic stenosis. The calculated aortic valve area is 1.5cm2. The mean AoV pressure gradient is 18mmHg. Mild aortic root dilatation (4.0 cm). The ascending aorta is mildly dilated (4.3 cm). The  rhythm was atrial fibrillation with controlled ventricular rate at rest. Compared to the study of 6/11/2019, pulmonary pressure is marginally lower with LVEF slightly higher. Aortic valvular gradients are unchanged. No change in aortic root or ascending aortic enlargement.date: results:ECG reviewed date:3/31/20 results:A-fib date: results:          METS/Exercise Tolerance:  1 - Eating, dressing   Hematologic:     (+) History of blood clots (PE) pt is anticoagulated, Anemia, Other Hematologic Disorder-Cirrhosis related thrombocytopenia; Antiphospholipid Ab      Musculoskeletal:   (+) arthritis,  other musculoskeletal- s/p LLE amputation      GI/Hepatic:     (+) GERD hiatal hernia, hepatitis type Alcoholic, liver disease, Other GI/Hepatic GI BLEED      Renal/Genitourinary:     (+) chronic renal disease, type: CRI, Pt does not require dialysis,       Endo: Comment: BMI 30    (+) Obesity (Class 1), .   (-) Type I DM and Type II DM   Psychiatric:     (+) psychiatric history other (comment) (EtOH abuse)      Infectious Disease:         Malignancy:   (+) Malignancy History of Prostate and Lymphoma/Leukemia          Other:    (+) H/O Chronic Pain,H/O chronic opiod use , other significant disability Other (comment)                        Physical Exam  Normal systems: pulmonary and dental    Airway   Mallampati: II  TM distance: >3 FB  Neck ROM: full    Dental     Cardiovascular   (+) murmur       Pulmonary             Lab Results   Component Value Date    WBC 4.3 04/06/2020    HGB 6.4 (LL) 04/08/2020    HCT 23.1 (L) 04/06/2020     (L) 04/06/2020    CRP 16.7 (H) 04/01/2019    SED 40 (H) 04/01/2019     04/06/2020    POTASSIUM 3.8 04/06/2020    CHLORIDE 107 04/06/2020    CO2 25 04/06/2020    BUN 25 04/06/2020    CR 1.47 (H) 04/06/2020    GLC 93 04/06/2020    BENNIE 8.1 (L) 04/06/2020    PHOS 3.6 03/02/2019    MAG 2.0 03/11/2020    ALBUMIN 2.4 (L) 04/01/2020    PROTTOTAL 6.0 (L) 04/01/2020    ALT 11 04/01/2020    AST  "25 04/01/2020    ALKPHOS 67 04/01/2020    BILITOTAL 1.4 (H) 04/01/2020    JOVANA 44 12/13/2018    PTT 36 06/11/2019    INR 1.34 (H) 04/06/2020    FIBR 278 12/04/2017    TSH 0.86 10/31/2018       Preop Vitals  BP Readings from Last 3 Encounters:   04/08/20 100/55   03/23/20 112/63   03/11/20 117/48    Pulse Readings from Last 3 Encounters:   04/08/20 71   03/23/20 67   03/11/20 70      Resp Readings from Last 3 Encounters:   04/08/20 18   03/23/20 16   03/11/20 18    SpO2 Readings from Last 3 Encounters:   04/08/20 95%   03/23/20 96%   03/11/20 98%      Temp Readings from Last 1 Encounters:   04/08/20 36.7  C (98  F) (Oral)    Ht Readings from Last 1 Encounters:   03/09/20 1.88 m (6' 2\")      Wt Readings from Last 1 Encounters:   04/07/20 102.6 kg (226 lb 3.1 oz)    Estimated body mass index is 29.04 kg/m  as calculated from the following:    Height as of 3/9/20: 1.88 m (6' 2\").    Weight as of this encounter: 102.6 kg (226 lb 3.1 oz).       Anesthesia Plan      History & Physical Review  History and physical reviewed and following examination; no interval change.    ASA Status:  4 .    NPO Status:  > 8 hours    Plan for MAC Reason for MAC:  Deep or markedly invasive procedure (G8)  PONV prophylaxis:  Ondansetron (or other 5HT-3)  RSI, Glidescope          Postoperative Care  Postoperative pain management:  IV analgesics and Multi-modal analgesia.      Consents  Anesthetic plan, risks, benefits and alternatives discussed with:  Patient..                 Mark Vilchis,   "

## 2020-04-09 NOTE — PLAN OF CARE
"Patient had large dark red loose BM in am.  Last Hgb 7.4 (will recheck in am).  EGD with dilation done and pill cam placed endoscopically today.  Per GI: \"Equipment can be removed when blinking blue light stops or at 0020. Please call if the light is blinking red prior to 0020. Please place equipment in black briefcase on windowsill.  We will  in am.\" Patient is A&Ox4.  Patient c/o HA relieved with dilaudid x1; denies N/V/SOB.  VSS; O2sats 90's RA.  Diet was advanced to full liquids/2gm Na+ - patient is tolerating this thus far.  Up with assist of 1/GB/walker to BSC.  "

## 2020-04-09 NOTE — PROGRESS NOTES
"[facility]  Hospitalist Progress Note  Cathie Burgess MD  04/09/2020    Assessment & Plan   Antonio Ramirez is a 75 year old male admitted on 3/31/2020 with complicated medical hx as below on chronic anticoagulation who was admitted with acute on chronic anemia due to rectal bleeding.     1. Alcohol cirrhosis with persistent ascites, portal hypertensive gastropathy, and assumed coagulapathy.  Alcohol dependence.  Severe acute GI bleed  Has standing appointment for paracentesis every 2 weeks (last 3/23) since 2016. Consume 3-4 vodka drinks per day.  No desire to quit drinking at this point. INR 3.92 on adm and reversed with vitamin K.  -- continue to hold PTA spironolactone and torsemide with soft BP in setting rectal bleeding. Indicated sporatic use PTA.  -- discontinued CIWA   -- unclear etiology, see discussion below  --continue to transfuse for hemoglobin < 7 g/dl  -- discontinued xarelto, asa on hold  -- appreciate GI follow-up, I discussed with Dr. Boyd on 4/7  -- had pill cam 4/3; Pill cam read 4/7. Pill cam did not leave his stomach the 12 hours it was on so no helpful information gained.  --had enteroscopy 4/8, showing some portal gastropathy and stomach, otherwise unremarkable, no bleeding  --Patient had PillCam placed endoscopically today  -- per Dr. Miller, [facility], \"If severe/life-threatening bleeding, will need to consider IR/angio\"    2. Acute on chronic anemia and chronic thrombocytopenia.   -- Chronic component related to ongoing alcohol use, cirrhosis, and CKD. Baseline hgb around 7.  -- 7 to 10 day history of rectal bleeding. Hgb 5.4 on adm with 5 units of PRBC transfusion.  -- EGD did not show source of bleeding. Colonoscopy with red blood in terminal ileum throughout entire examined colon, one 10 mm polyp in the ascending colon (not removed), sigmoid diverticulosis, a non-bleeding rectal varix, and non-bleeding external hemorrhoids. Tagged RBC scan suggested bleed in duodenum " which was felt inaccurate as EGD showed normal duodenum.  -- changed to po protonix  --transfused 1 unit of PRBC on 4/4, repeat unit 4/8  --Cont hold ASA 81mg daily.  --IV ceftriaxone 2 gram daily for SBP prophylaxis.  Pharm.D. recommends continuing parenteral antibiotics since procedure is planned for today  --continue to follow hgb  --Heme/Onc consult requested, I discussed with Patricio Jones and appreciate his evaluation and recommendations  --They recommend repeating A STEFANY studies including beta-2 glycoprotein, anticardiolipin antibodies, lupus inhibitors.  If these are negative, he could safely remain off anticoagulation.  If positive, and a source for bleeding can be identified and corrected, they recommend attempting to resume low-dose rivaroxaban. If bleeding site not discovered or controllable, then the risks of anticoagulation are likely too great and he will need to be off anticoagulation.      3. CAD s/p 3v-CABG.  Ischemic cardiomyopathy with HFrEF.  Severe pulmonary hypertension.  Valvular disorder. Mild to moderate mitral regurgitation, mild mitral stenosis, mild tricuspid regurgitation, and aortic stenosis.  -- denies any chest pain or sob  -- holding asa    4. HTN, DLD.  -- not on any bp medications  -- just diuretics    5. Hx PEA arrest and VT arrest 6/2019.  Noncompliant in review of most recent cardiology note.  --Tele, I&Os, and daily weights.  --PTA ASA and diuretics on hold as above.     6. Persistent atrial fibrillation.  Patient stated that she goes in and out of atrial fibrillation.  He is compliant with daily Xarelto.  --discontinued Xarelto   --Hold aspirin as above.     7. Antiphospholipid antibody syndrome with hx PE s/p IVC filter (2007).  --discontinued Xarelto as above.  --Hold aspirin as above.  --See discussion above, I appreciate heme/onc recommendations    8. CKD, stage III. Baseline Cr 1.5, currently Cr 1.47     9. Chronic neuropathy. Gabapentin.       10. Left BKA.     11. Hx  "prostate CA and large cell B-cell non-Hodgkin's lymphoma:  S/p radioactive seeding of the prostate.  Appears to be in remission.    --No interventions necessary at this point.      12.  Severe malnutrition  Malnutrition: (4/7)   % Weight Loss:  > 7.5% in 3 months (severe malnutrition)   % Intake:  </= 50% for >/= 5 days (severe malnutrition) - on average   Subcutaneous Fat Loss:  Unable   Muscle Loss:  Unable   Fluid Retention:  Moderate       Malnutrition Diagnosis: Severe malnutrition      Diet: 2 gram Na diet  Levin Catheter: not present  DVT Prophylaxis: Pneumatic Compression Devices  Code Status: Full Code confirmed with patient 4/2.     Expected discharge:  Likely in hospital for several more days, until hemoglobin stabilizes, decisions reached regarding anticoagulants and possibly source for bleeding identified.  Needs home health care at discharge.    Cathie Burgess MD  Hospitalist  [facility]  Text Page (7am - 6pm, M-F)        Interval History   \"That  has not even been to the factory.\"  Patient is back from endoscopic placement of PillCam.  He is mildly confused, also complains of headache.  He thinks his headache is related to not eating, also receiving anesthesia. he requested Dilaudid.  He has no new respiratory or GI complaints.    -Data reviewed today: I reviewed all new labs and imaging over the last 24 hours. I personally reviewed no images or EKG's today.    Physical Exam   Heart Rate: 59, Blood pressure 130/69, pulse 63, temperature 97.5  F (36.4  C), temperature source Axillary, resp. rate 16, weight 110.9 kg (244 lb 7.8 oz), SpO2 96 %.  Vitals:    04/04/20 0700 04/07/20 0500 04/09/20 0603   Weight: 102.4 kg (225 lb 12.8 oz) 102.6 kg (226 lb 3.1 oz) 110.9 kg (244 lb 7.8 oz)     Vital Signs with Ranges  Temp:  [97  F (36.1  C)-98.9  F (37.2  C)] 97.5  F (36.4  C)  Pulse:  [59-74] 63  Heart Rate:  [57-77] 59  Resp:  [10-17] 16  BP: ()/(44-70) 130/69  SpO2:  [92 " %-100 %] 96 %  I/O's Last 24 hours  I/O last 3 completed shifts:  In: 316.67   Out: 485 [Urine:485]    Constitutional: Awake, alert  Respiratory: Clear to auscultation bilaterally, no crackles or wheezing  Cardiovascular: Regular rate and rhythm, normal S1 and S2, + murmur noted  GI: Normal bowel sounds, soft, distended, firm and non-tender  Skin/Integumen: No rashes, no cyanosis, + edema, L BKA, brusing  Other: Mood is calm at the time of our interview    Medications   All medications were reviewed.    - MEDICATION INSTRUCTIONS -       - MEDICATION INSTRUCTIONS -         gabapentin  600 mg Oral BID     metoclopramide  5 mg Intravenous Q6H     multivitamin w/minerals  1 tablet Oral Daily     pantoprazole  40 mg Oral BID AC        Data   Recent Labs   Lab 04/09/20  0612 04/08/20  1903 04/08/20  0756  04/06/20  1742 04/06/20  0744  04/05/20  0654   WBC 4.1 4.4  --   --   --  4.3  --  4.0   HGB 7.4* 8.0* 6.4*   < > 7.7* 7.2*   < > 7.5*   MCV 86 85  --   --   --  82  --  83   * 148*  --   --   --  148*  --  126*   INR 1.40*  --   --   --  1.34*  --   --   --      --   --   --   --  138  --  135   POTASSIUM 4.2  --   --   --   --  3.8  --  3.7   CHLORIDE 107  --   --   --   --  107  --  103   CO2 26  --   --   --   --  25  --  28   BUN 22  --   --   --   --  25  --  28   CR 1.30*  --   --   --   --  1.47*  --  1.64*   ANIONGAP 4  --   --   --   --  6  --  4   BENNIE 8.2*  --   --   --   --  8.1*  --  8.0*   GLC 88  --   --   --   --  93  --  113*   ALBUMIN 2.0*  --   --   --   --   --   --   --    PROTTOTAL 5.4*  --   --   --   --   --   --   --    BILITOTAL 0.7  --   --   --   --   --   --   --    ALKPHOS 57  --   --   --   --   --   --   --    ALT 10  --   --   --   --   --   --   --    AST 21  --   --   --   --   --   --   --     < > = values in this interval not displayed.       Recent Results (from the past 24 hour(s))   XR Abdomen 1 View    Narrative    EXAM: ABDOMEN 2 VIEWS  LOCATION: [facility]  SERVICES  DATE/TIME: 4/9/2020 6:51 AM    INDICATION: PillCam placed on April 3. Evaluate for retained PillCam.  COMPARISON: None.      Impression    IMPRESSION:   1. A 1.7 x 1.2 cm radiopaque foreign body projects over the central aspect of the right iliac bone. This is consistent with a retained PillCam that could either be within the distal ileum or cecum.  2. No evidence of bowel obstruction.   3. A few small radiodensities project over the expected location of the left kidney, possibly representing renal calculi.  4. Small right pleural effusion.

## 2020-04-09 NOTE — ANESTHESIA PREPROCEDURE EVALUATION
Anesthesia Pre-Procedure Evaluation    Patient: Antonio Ramirez   MRN: [id number] : 1945          Preoperative Diagnosis: GI bleed [K92.2]    Procedure(s):  ESOPHAGOGASTRODUODENOSCOPY (EGD)    Past Medical History:   Diagnosis Date     Alcoholism (H)      Amputated left leg (H)      Anemia      Anticardiolipin antibody syndrome (H)      Antiphospholipid antibody syndrome (H)      Antiplatelet or antithrombotic long-term use      Aortic root dilatation (H)      Aortic stenosis      Arthritis      Balance problem      Coronary artery disease     CABG 2007: SVG to LAD, SVG to diagonal, SVG to OM; cardiac cath 18: thrombectomy for restenosis of stents-sent for urgent CABG, cath 2007: GRECIA x2 to LAD, Grecia to diagonal     Gastro-oesophageal reflux disease      Heart attack (H)     apparently arrested, cardiac X5     Hiatal hernia      Hypercholesterolemia      Hypertension      Ischemic cardiomyopathy      Left foot drop      Liver disease     alcoholic liver disease, alcoholic cirrohsosis, portal hypertension     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)     ?When diagnosed, patient not aware of this     Moderate mitral regurgitation      Monoclonal gammopathy      Neuropathy      Noninfectious ileitis     diverticulitis of colon     Nonrheumatic tricuspid valve regurgitation      Paroxysmal atrial fibrillation (H)      PE (pulmonary embolism) 2006    saddle emboli 2006     Prostate cancer (H)     Treated with radiation (seed implants in ) -- no problems since     Pulmonary hypertension (H)      Renal disease     kidney stones     Syncope      Thrombocytopenia (H)      Urethral stricture      Venous thrombosis     IVC filter and lysis procedures      Past Surgical History:   Procedure Laterality Date     AMPUTATE LEG BELOW KNEE Left 2014    related to gangrene, started as foot ulcer related to neuropathy     AMPUTATE LEG BELOW KNEE Left 2017    Procedure: AMPUTATE LEG BELOW  KNEE;  Exploration of Left Leg Below Knee Amputation Stump with Ligation of Bleeders.;  Surgeon: Leandro Arreola MD;  Location:  OR     APPENDECTOMY       APPLY WOUND VAC Left 12/6/2017    Procedure: APPLY WOUND VAC;;  Surgeon: Saima Howard MD;  Location:  [facility]     ARTHROPLASTY HIP Right 11/27/2018    Procedure: RIGHT TOTAL HIP ARTHROPLASTY;  Surgeon: Saima Howard MD;  Location:  OR     ARTHROPLASTY KNEE Right 9/19/2014    Procedure: ARTHROPLASTY KNEE;  Surgeon: Saima Howard MD;  Location:  OR     CARDIAC SURGERY      CABG     CHOLECYSTECTOMY       COLONOSCOPY       COLONOSCOPY N/A 4/1/2020    Procedure: COLONOSCOPY;  Surgeon: Emeka Freeman DO;  Location:  GI     COMBINED CYSTOSCOPY, RETROGRADES, EXCHANGE STENT URETER(S) Left 7/24/2018    Procedure: COMBINED CYSTOSCOPY, RETROGRADES, EXCHANGE STENT URETER(S);  CYSTOSCOPY, BILATERAL RETROGRADES, BILATERAL URETERAL STENT EXCHANGE ;  Surgeon: Matt Cervantes MD;  Location:  OR     CRANIOTOMY Right 4/7/2018    Procedure: CRANIOTOMY;  Right Craniotomy For Subdural Hematoma;  Surgeon: Jono Issa MD;  Location:  OR     CYSTOSCOPY, DILATE URETHRA, COMBINED N/A 10/12/2016    Procedure: COMBINED CYSTOSCOPY, DILATE URETHRA;  Surgeon: Dimitry Bello MD;  Location:  OR     CYSTOSCOPY, REMOVE STENT(S), COMBINED Right 7/24/2018    Procedure: COMBINED CYSTOSCOPY, REMOVE STENT(S);;  Surgeon: Jose Hunter MD;  Location:  OR     ENDOSCOPIC STRIPPING VEIN(S)       esophagogastroduodenoscopy       ESOPHAGOSCOPY, GASTROSCOPY, DUODENOSCOPY (EGD), COMBINED N/A 3/11/2019    Procedure: COMBINED ESOPHAGOSCOPY, GASTROSCOPY, DUODENOSCOPY (EGD), BIOPSY SINGLE OR MULTIPLE;  Surgeon: Katherin Boyd MD;  Location:  GI     ESOPHAGOSCOPY, GASTROSCOPY, DUODENOSCOPY (EGD), COMBINED N/A 9/27/2019    Procedure: ESOPHAGOGASTRODUODENOSCOPY, WITH FOREIGN BODY REMOVAL;  Surgeon: Raegan Mckeon MD;  Location:   GI     ESOPHAGOSCOPY, GASTROSCOPY, DUODENOSCOPY (EGD), COMBINED N/A 4/1/2020    Procedure: ESOPHAGOGASTRODUODENOSCOPY (EGD);  Surgeon: Emeka Freeman DO;  Location:  GI     EYE SURGERY      cataracts     GENITOURINARY SURGERY      Prostate Seen Implants     HERNIA REPAIR      Umbilical     INCISION AND DRAINAGE LOWER EXTREMITY, COMBINED Left 12/1/2017    Procedure: COMBINED INCISION AND DRAINAGE LOWER EXTREMITY;  INCISION AND DRAINAGE OF LEFT BELOW KNEE AMPUTATION ABSCESS, WOUND VAC PLACEMENT;  Surgeon: Saima Howard MD;  Location:  OR     IR IVC FILTER PLACEMENT       IRRIGATION AND DEBRIDEMENT LOWER EXTREMITY, COMBINED Left 12/6/2017    Procedure: COMBINED IRRIGATION AND DEBRIDEMENT LOWER EXTREMITY;  IRRIGATION AND DEBRIDEMENT OF LEFT BELOW KNEE AMPUTATION SITE WITH WOUND VAC REPLACEMENT;  Surgeon: Saima Howard MD;  Location:  [facility]     IRRIGATION AND DEBRIDEMENT LOWER EXTREMITY, COMBINED Left 12/8/2017    Procedure: COMBINED IRRIGATION AND DEBRIDEMENT LOWER EXTREMITY;  IRRIGATION AND DEBRIDEMENT OF LEFT BELOW THE KNEE AMPUTATION AND SHORTENING OF THE TIBIA WITH WOUND CLOSURE;  Surgeon: Saima Howard MD;  Location:  OR     LASER HOLMIUM LITHOTRIPSY URETER(S), INSERT STENT, COMBINED Bilateral 6/28/2018    Procedure: COMBINED CYSTOSCOPY, URETEROSCOPY, LASER HOLMIUM LITHOTRIPSY URETER(S), INSERT STENT;  CYSTOSCOPY, BILATERAL URETEROSCOPY,  HOLMIUM LASER LITHOTRIPSY, BILATERAL STENT PLACEMENT, STONE BASKETING;  Surgeon: Jose Hunter MD;  Location:  OR     LASER HOLMIUM LITHOTRIPSY URETER(S), INSERT STENT, COMBINED Left 8/14/2018    Procedure: COMBINED CYSTOSCOPY, URETEROSCOPY, LASER HOLMIUM LITHOTRIPSY URETER(S), INSERT STENT;  CYSTOSCOPY, LEFT URETEROSCOPY, LEFT LASER HOLMIUM LITHOTRIPSY URETER(S) REMOVAL BILATERAL STENTS;  Surgeon: Jose Hunter MD;  Location:  OR     ORTHOPEDIC SURGERY      (R) knee scope     ORTHOPEDIC SURGERY      total hip      ORTHOPEDIC SURGERY       elbow surgery       Anesthesia Evaluation     .             ROS/MED HX    ENT/Pulmonary:      (-) tobacco use, asthma, COPD and sleep apnea   Neurologic:     (+)neuropathy - Left foot drop,    (-) seizures, CVA and TIA   Cardiovascular:     (+) Dyslipidemia, hypertension-Peripheral Vascular Disease-CAD, -past MI,CABG-. Taking blood thinners : . CHF etiology: Ischemic cardiomyopathy . . :. dysrhythmias a-fib, valvular problems/murmurs type: AS and MR . pulmonary hypertension, Previous cardiac testing Echodate:9/2019results:   Left ventricular systolic function is moderately reduced.  The visual ejection fraction is estimated at 40-45%.  LVEF 41% based on biplane 2D tracing.  There is severe distal anterior, mid-distal septal, and apical wall  hypokinesis.  Severe hypokinesis of the distal inferior wall.  The right ventricle is moderately dilated.  Mildly decreased right ventricular systolic function  The left atrium is severely dilated.  The right atrium is mild to moderately dilated.  There is moderate mitral annular calcification.  There is mild to moderate (1-2+) mitral regurgitation.  There is mild mitral stenosis.  The mean mitral valve gradient is 4mmHg at heart rate of 69 bpm.  There is mild (1+) tricuspid regurgitation.  Severe (>55mmHg) pulmonary hypertension is present.  Flattened septum is consistent with RV pressure overload.  There is sclerosis, calcification, and restriction of the aortic valve opening  compatible with moderate aortic stenosis.  The calculated aortic valve area is 1.5cm2.  The mean AoV pressure gradient is 18mmHg.  Mild aortic root dilatation (4.0 cm).  The ascending aorta is mildly dilated (4.3 cm).  The rhythm was atrial fibrillation with controlled ventricular rate at rest.     Compared to the study of 6/11/2019, pulmonary pressure is marginally lower  with LVEF slightly higher. Aortic valvular gradients are unchanged. No change  in aortic root or ascending aortic  "enlargement.  _____________________________________________________________________________  __        Left Ventricle  The left ventricle is normal in size. There is normal left ventricular wall  thickness. Left ventricular systolic function is moderately reduced. The  visual ejection fraction is estimated at 40-45%. LVEF 41% based on biplane 2D  tracing. Diastolic function not assessed due to atrial fibrillation. LV  filling pressure (E/E\") probably not accurate due to significant MAC. There is  severe distal anterior, mid-distal septal, and apical wall hypokinesis.  Severe hypokinesis of the distal inferior wall. Flattened septum is consistent  with RV pressure overload. There is no thrombus seen in the left ventricle.     Right Ventricle  The right ventricle is moderately dilated. Mildly decreased right ventricular  systolic function.     Atria  The left atrium is severely dilated. The right atrium is mild to moderately  dilated. There is no color Doppler evidence of an atrial shunt.     Mitral Valve  There is moderate mitral annular calcification. The mitral valve leaflets  appear thickened, but open well. The mitral valve chordae are thickened and/or  calcified. There is mild to moderate (1-2+) mitral regurgitation. There is  mild mitral stenosis. The mean mitral valve gradient is 4mmHg.     Tricuspid Valve  The tricuspid valve is normal in structure and function. There is mild (1+)  tricuspid regurgitation. The right ventricular systolic pressure is  approximated at 63mmHg plus the right atrial pressure. Severe (>55mmHg)  pulmonary hypertension is present.        Aortic Valve  There is trace aortic regurgitation. There is sclerosis, calcification, and  restriction of the aortic valve opening compatible with moderate aortic  stenosis. The calculated aortic valve area is 1.5cm2. The mean AoV pressure  gradient is 18mmHg.     Pulmonic Valve  The pulmonic valve is normal in structure and function. There is no " pulmonic  valvular regurgitation.     Vessels  Mild aortic root dilatation. The ascending aorta is Mildly dilated. IVC  diameter >2.1 cm collapsing <50% with sniff suggests a high RA pressure  estimated at 15 mmHg or greater.     Pericardium  There is no pericardial effusion.Stress Testdate:10/2019 results:E. A moderate sized transmural infarction of the distal anteroseptal  wall and apex without residual ischemia.     B. A small to moderate sized defect consisting of nontransmural  infarction at the base of the inferior wall with a small area of  residual ischemia involving the mid and distal inferior wall.   2. Gated images demonstrated flattening of the interventricular  septum, suggestive of right ventricular volume or pressure overload.  The distal anteroseptal and apical walls are akinetic or possibly  dyskinetic. The basal inferior wall is hypokinetic with remainder of  the inferior wall appears to contract well. Mild left ventricular  chamber enlargement is present. The left ventricular systolic  function is mildly depressed, with an ejection fraction of 46%.ECG reviewed date:3/2020 results:Atrial fibrillation at 85 bpm date: results:          METS/Exercise Tolerance:  1 - Eating, dressing   Hematologic: Comments: History of PE    (+) History of blood clots pt is anticoagulated, Anemia (Monoclonal antibody), Other Hematologic Disorder-     History of Transfusion: Anticardiolipin antibody.   Musculoskeletal: Comment: S/P LLE BKA  (+) arthritis,  -       GI/Hepatic: Comment: Cirrhosis, portal HTN  GIB    (+) GERD Asymptomatic on medication, hiatal hernia, liver disease,       Renal/Genitourinary:     (+) chronic renal disease, type: CRI,       Endo: Comment: BMI 31    (+) Obesity, .      Psychiatric: Comment: EtOH abuse        Infectious Disease:   (+) MRSA,       Malignancy:   (+) Malignancy History of Prostate and Lymphoma/Leukemia          Other:                          Physical Exam  Normal systems:  "dental    Airway   Mallampati: II  TM distance: >3 FB  Neck ROM: full    Dental     Cardiovascular   Rhythm and rate: irregular      Pulmonary    breath sounds clear to auscultation            Lab Results   Component Value Date    WBC 4.1 04/09/2020    HGB 7.4 (L) 04/09/2020    HCT 24.1 (L) 04/09/2020     (L) 04/09/2020    CRP 16.7 (H) 04/01/2019    SED 40 (H) 04/01/2019     04/09/2020    POTASSIUM 4.2 04/09/2020    CHLORIDE 107 04/09/2020    CO2 26 04/09/2020    BUN 22 04/09/2020    CR 1.30 (H) 04/09/2020    GLC 88 04/09/2020    BENNIE 8.2 (L) 04/09/2020    PHOS 3.6 03/02/2019    MAG 2.0 03/11/2020    ALBUMIN 2.0 (L) 04/09/2020    PROTTOTAL 5.4 (L) 04/09/2020    ALT 10 04/09/2020    AST 21 04/09/2020    ALKPHOS 57 04/09/2020    BILITOTAL 0.7 04/09/2020    JOVANA 44 12/13/2018    PTT 36 06/11/2019    INR 1.40 (H) 04/09/2020    FIBR 278 12/04/2017    TSH 0.86 10/31/2018       Preop Vitals  BP Readings from Last 3 Encounters:   04/09/20 108/60   03/23/20 112/63   03/11/20 117/48    Pulse Readings from Last 3 Encounters:   04/09/20 65   03/23/20 67   03/11/20 70      Resp Readings from Last 3 Encounters:   04/09/20 16   03/23/20 16   03/11/20 18    SpO2 Readings from Last 3 Encounters:   04/09/20 92%   03/23/20 96%   03/11/20 98%      Temp Readings from Last 1 Encounters:   04/09/20 36.4  C (97.5  F) (Axillary)    Ht Readings from Last 1 Encounters:   03/09/20 1.88 m (6' 2\")      Wt Readings from Last 1 Encounters:   04/09/20 110.9 kg (244 lb 7.8 oz)    Estimated body mass index is 31.39 kg/m  as calculated from the following:    Height as of 3/9/20: 1.88 m (6' 2\").    Weight as of this encounter: 110.9 kg (244 lb 7.8 oz).       Anesthesia Plan      History & Physical Review  History and physical reviewed and following examination; no interval change.    ASA Status:  4 .    NPO Status:  > 8 hours    Plan for MAC   PONV prophylaxis:  Ondansetron (or other 5HT-3)  Favor ephedrine given pulmonary HTN    "     Postoperative Care  Postoperative pain management:  Multi-modal analgesia.      Consents  Anesthetic plan, risks, benefits and alternatives discussed with:  Patient..                 Sonido Latif MD

## 2020-04-09 NOTE — PROGRESS NOTES
[facility] Progress Note      Pill camera was placed endoscopically at 1220.     -NPO for 2 hours  -Can have clear liquids at 1420     Equipment can be removed when blinking blue light stops or at 0020. Please call if the light is blinking red prior to 0020. Please place equipment in black briefcase on windowsill.  We will  in am.      Please call if the patient develops pain, nausea, or vomiting.      Patient can not have MRI until PillCam has passed. Initial Pill Cam is in area of cecum.      Call Dr. Boyd with any questions or concerns.     Gracy Nelson CNP  [facility]  Office:  [phone] call if needed after 5PM  Cell:  [phone]

## 2020-04-09 NOTE — ANESTHESIA POSTPROCEDURE EVALUATION
Patient: Antonio Ramirez    Procedure(s):  ESOPHAGOGASTRODUODENOSCOPY, WITH DILATION  Esophagoscopy, gastroscopy, duodenoscopy (EGD), combined    Diagnosis:GI bleed [K92.2]  Diagnosis Additional Information: No value filed.    Anesthesia Type:  No value filed.    Note:  Anesthesia Post Evaluation    Patient location during evaluation: Bedside  Patient participation: Able to fully participate in evaluation  Level of consciousness: awake and alert  Pain management: adequate  Airway patency: patent  Cardiovascular status: acceptable  Respiratory status: acceptable  Hydration status: acceptable  PONV: none             Last vitals:  Vitals:    04/09/20 1345 04/09/20 1400 04/09/20 1528   BP:   98/54   Pulse:   62   Resp: 16 16 16   Temp:   36.3  C (97.3  F)   SpO2:   94%         Electronically Signed By: Sonido Latif MD  April 9, 2020  4:34 PM

## 2020-04-09 NOTE — PLAN OF CARE
A+O VSS on room air. Multiple bloody stools this shift, stated abdominal discomfort after bowel prep. Voiding adequately. Denies pain. Pt refused physical assessments.

## 2020-04-09 NOTE — PLAN OF CARE
Pt arrived back from PACU and settled at 1730.  VSS an RA. Denies pain. Shares feelings that staff in general do not know what we are doing, however seems more optimistic after procedure. Explanations provided with plan of care. No shortness of breath reported.   Informed pt of bowel prep with miralax and Gatorade ordered, awaiting Gatorade from dietary.

## 2020-04-10 NOTE — PLAN OF CARE
"VSS.  Pt refused to let this RN do an assessment on him.  Pt stated he was going to watch a movie on his ipad and didn't need to be bothered.  The pt asked for a \"cola\", soda was brought to the pt and informed him if he needed assistance from staff he should use his call light, pt stated he understood.    "

## 2020-04-10 NOTE — PROGRESS NOTES
"[facility]  Hospitalist Progress Note  Cathie Burgess MD  04/10/2020    Assessment & Plan   Antonio Ramirez is a 75 year old male admitted on 3/31/2020 with complicated medical hx as below on chronic anticoagulation who was admitted with acute on chronic anemia due to rectal bleeding.     1. Alcohol cirrhosis with persistent ascites, portal hypertensive gastropathy, and assumed coagulapathy.  Alcohol dependence.  Severe acute GI bleed  Has standing appointment for paracentesis every 2 weeks (last 3/23) since 2016. Consume 3-4 vodka drinks per day.  No desire to quit drinking at this point. INR 3.92 on adm and reversed with vitamin K.  -- continue to hold PTA spironolactone and torsemide with soft BP in setting rectal bleeding. Indicated sporatic use PTA.  -- discontinued CIWA   -- unclear etiology, see discussion below  --continue to transfuse for hemoglobin < 7 g/dl  -- discontinued xarelto, asa on hold  -- appreciate GI follow-up, I discussed with Dr. Boyd on 4/7  -- had pill cam 4/3; Pill cam read 4/7. Pill did not leave his stomach the 12 hours it was on so no helpful information gained.  --had enteroscopy 4/8, showing some portal gastropathy and stomach, otherwise unremarkable, no bleeding  --Patient had PillCam placed endoscopically 4/9  --see update from Dr. Boyd, \"Pill cam to be picked up and read today. Hope to have some results later today\"  --patient had a maroon colored stool this morning; also per Dr. Boyd, \"Still having ongoing intermittent bleeding, etiology unclear. Could be small bowel or colon (only fair prep on colonoscopy)  --repeat transfusion today    2. Acute on chronic anemia and chronic thrombocytopenia.   -- Chronic component related to ongoing alcohol use, cirrhosis, and CKD. Baseline hgb around 7.  -- 7 to 10 day history of rectal bleeding. Hgb 5.4 on adm with 5 units of PRBC transfusion.  -- EGD did not show source of bleeding. Colonoscopy with red blood in " terminal ileum throughout entire examined colon, one 10 mm polyp in the ascending colon (not removed), sigmoid diverticulosis, a non-bleeding rectal varix, and non-bleeding external hemorrhoids. Tagged RBC scan suggested bleed in duodenum which was felt inaccurate as EGD showed normal duodenum.  -- changed to po protonix  --transfused 1 unit of PRBC on 4/4, repeat unit 4/8  --Cont hold ASA 81mg daily.  --IV ceftriaxone 2 gram daily for SBP prophylaxis.  Pharm.D. recommends continuing parenteral antibiotics since procedure is planned for today  --continue to follow hgb  --Heme/Onc consult requested, I discussed with Patricio Jones and appreciate his evaluation and recommendations  --They recommend repeating A STEFANY studies including beta-2 glycoprotein, anticardiolipin antibodies, lupus inhibitors.  If these are negative, he could safely remain off anticoagulation.  If positive, and a source for bleeding can be identified and corrected, they recommend attempting to resume low-dose rivaroxaban. If bleeding site not discovered or controllable, then the risks of anticoagulation are likely too great and he will need to be off anticoagulation.      3. CAD s/p 3v-CABG.  Ischemic cardiomyopathy with HFrEF.  Severe pulmonary hypertension.  Valvular disorder. Mild to moderate mitral regurgitation, mild mitral stenosis, mild tricuspid regurgitation, and aortic stenosis.  -- denies any chest pain or sob  -- holding asa    4. HTN, DLD.  -- not on any bp medications  -- just diuretics    5. Hx PEA arrest and VT arrest 6/2019.  Noncompliant in review of most recent cardiology note.  --Tele, I&Os, and daily weights.  --PTA ASA and diuretics on hold as above.     6. Persistent atrial fibrillation.  Patient stated that she goes in and out of atrial fibrillation.  He is compliant with daily Xarelto.  --discontinued Xarelto   --Hold aspirin as above.     7. Antiphospholipid antibody syndrome with hx PE s/p IVC filter  "(2007).  --discontinued Xarelto as above.  --Hold aspirin as above.  --See discussion above, I appreciate heme/onc recommendations    8. CKD, stage III. Baseline Cr 1.5, currently Cr 1.47     9. Chronic neuropathy. Gabapentin.       10. Left BKA.     11. Hx prostate CA and large cell B-cell non-Hodgkin's lymphoma:  S/p radioactive seeding of the prostate.  Appears to be in remission.    --No interventions necessary at this point.      12.  Severe malnutrition  Malnutrition: (4/7)   % Weight Loss:  > 7.5% in 3 months (severe malnutrition)   % Intake:  </= 50% for >/= 5 days (severe malnutrition) - on average   Subcutaneous Fat Loss:  Unable   Muscle Loss:  Unable   Fluid Retention:  Moderate       Malnutrition Diagnosis: Severe malnutrition      Diet: 2 gram Na diet  Levin Catheter: not present  DVT Prophylaxis: Pneumatic Compression Devices  Code Status: Full Code confirmed with patient 4/2.     Expected discharge:  Likely in hospital for several more days, until hemoglobin stabilizes, decisions reached regarding anticoagulants and possibly source for bleeding identified.  Needs home health care at discharge.    Cathie Burgess MD  Hospitalist  [facility]  Text Page (7am - 6pm, M-F)      Interval History   \" Get me something warm.\"  Patient says his room is \"freezing,\" he talked with unit manager and no solution was offered.  We reminded him that he can have more blankets on the bed.  He denies abdominal pain, says he is discouraged by long hospital stay, \"I am going on my third week.\"  Did not remember mentioning \"a factory\" to me yesterday, says he, \"must have been using a metaphor.\"    -Data reviewed today: I reviewed all new labs and imaging over the last 24 hours. I personally reviewed no images or EKG's today.    Physical Exam   Heart Rate: 75, Blood pressure 103/55, pulse 72, temperature 98.5  F (36.9  C), temperature source Oral, resp. rate 16, weight 111 kg (244 lb 11.4 oz), SpO2 91 " %.  Vitals:    04/07/20 0500 04/09/20 0603 04/10/20 0618   Weight: 102.6 kg (226 lb 3.1 oz) 110.9 kg (244 lb 7.8 oz) 111 kg (244 lb 11.4 oz)     Vital Signs with Ranges  Temp:  [97.3  F (36.3  C)-98.5  F (36.9  C)] 98.5  F (36.9  C)  Pulse:  [54-72] 72  Heart Rate:  [59-75] 75  Resp:  [11-17] 16  BP: ()/(49-69) 103/55  SpO2:  [91 %-99 %] 91 %  I/O's Last 24 hours  I/O last 3 completed shifts:  In: 490 [P.O.:240; I.V.:250]  Out: 400 [Urine:400]    Constitutional: Awake, alert  Respiratory: Clear to auscultation bilaterally, no crackles or wheezing  Cardiovascular: Regular rate and rhythm, normal S1 and S2, + murmur noted  GI: Normal bowel sounds, soft, distended, firm and non-tender  Skin/Integumen: No rashes, no cyanosis, + edema, L BKA, brusing  Other: Mood is calm at the time of our interview    Medications   All medications were reviewed.      gabapentin  600 mg Oral BID     multivitamin w/minerals  1 tablet Oral Daily     pantoprazole  40 mg Oral BID AC        Data   Recent Labs   Lab 04/10/20  0736 04/09/20  0612 04/08/20  1903  04/06/20  1742 04/06/20  0744  04/05/20  0654   WBC  --  4.1 4.4  --   --  4.3  --  4.0   HGB 6.9* 7.4* 8.0*   < > 7.7* 7.2*   < > 7.5*   MCV  --  86 85  --   --  82  --  83   PLT  --  144* 148*  --   --  148*  --  126*   INR  --  1.40*  --   --  1.34*  --   --   --    NA  --  137  --   --   --  138  --  135   POTASSIUM  --  4.2  --   --   --  3.8  --  3.7   CHLORIDE  --  107  --   --   --  107  --  103   CO2  --  26  --   --   --  25  --  28   BUN  --  22  --   --   --  25  --  28   CR  --  1.30*  --   --   --  1.47*  --  1.64*   ANIONGAP  --  4  --   --   --  6  --  4   BENNIE  --  8.2*  --   --   --  8.1*  --  8.0*   GLC  --  88  --   --   --  93  --  113*   ALBUMIN  --  2.0*  --   --   --   --   --   --    PROTTOTAL  --  5.4*  --   --   --   --   --   --    BILITOTAL  --  0.7  --   --   --   --   --   --    ALKPHOS  --  57  --   --   --   --   --   --    ALT  --  10  --   --   --    --   --   --    AST  --  21  --   --   --   --   --   --     < > = values in this interval not displayed.       No results found for this or any previous visit (from the past 24 hour(s)).

## 2020-04-10 NOTE — PLAN OF CARE
Patient A&Ox4, up with 1 assistance with walker to commode.  VSS.  Denies pain.  Denies dizziness/or lightheadedness.  Hemoglobin 6.9 today (1 unit PRBC's transfused), recheck ordered for 1800.  Hemoglobin checks every 12 hours.  Patient had 1 bloody stool today, MD aware.  GI following.  Telemetry Afib with CVR.  Left BKA (prosthetic on).  Tolerating diet, no nausea.  Continue to monitor.

## 2020-04-10 NOTE — PROGRESS NOTES
GI follow up     Pill cam results back today. Showed no lesion in the small bowel to account for bleeding. Possibly bleeding from AVM which may be small and difficult to locate.     Rec:  -Continue current management  -Tagged RBC scan if significant rebleed.  -Transfusions per hospitalist service    Dr. Nowak will be rounding this weekend.     Katherin Boyd MD  [facility]  Phone [phone] until 4 pm  Office [phone] for after hours on call provider

## 2020-04-10 NOTE — PROGRESS NOTES
Notified MD hemoglobin 6.9 this am.  Will continue to monitor. Talked to Dr. Burgess and will transfuse 1 unit of blood.  Orders released.

## 2020-04-10 NOTE — PROGRESS NOTES
Progress Note     Primary Oncologist/Hematologist:  Dr. Raymond          Assessment and Plan:New actions/orders today (04/10/2020) are underlined.     Sage is a 75 year old admitted 3/31 with rectal bleeding     APLA with history of saddle PE  - Followed by Dr. Raymond, but last seen 10/2018  - History of saddle PE 2007 with placement of IVC filter  - History of SDH s/p evacuation in 4/2018  - Has been on low dose rivaroxaban  - Now admitted with GI bleeding and off of DOAC therapy  - Discussed with Dr. Raymond this morning regarding the question of when/if able to resume anticoagulation  - Lupus anticoagulant is positive. If source of bleeding can be identified and controlled, we can try to carefully resume low dose rivaroxaban. If bleeding site not discovered or controllable, then the risks of anticoagulation are likely too great and he will need to be off anticoagulation.   - With ongoing bleeding, continue to hold anticoagulation. It is looking increasingly unlikely that he will be able to safely resume anticoagulation.     Chronic anemia, acute exacerbation due to GI bleeding  - Has chronic anemia likely from liver disease, previous bleeding and underlying low grade lymphoproliferative disorder  - DOAC therapy has been discontinued this admission with acute bleeding  - Transfusions have been given   - GI consulted and extensive work up in process.    - With underlying history of lymphoproliferative disorder will also round out evaluation to evaluate for hemolysis.  - GISELL negative, LDH and haptoglobin normal. Smear does not show hemolysis  - Most recent iron studies 3/2020. This could be repeated. Iron could be given IV to replace losses from GI bleeding.  - Continue close monitoring of blood counts  - Transfuse for hemoglobin < 6.0 (with attempt judicious use with low supply given COVID 19 pandemic), active bleeding or symptomatic anemia.  - 1 unit PRBCs given 4/10     Elevated INR  - Likely from  underlying liver disease  - 10 mg vitamin K given 3/31  - INR has improved, but not normalized. Again this is likely a fixed process due to underlying liver disease.     Time spent: 5 minutes on the phone reviewing labs and planning for next steps    Patricio QUESADA CNP  [facility]  [phone] (office), [phone] (cell)        Interval History:     Still bleeding. Getting another unit of blood. PillCam study in process.               Review of Systems:     The 5 point Review of Systems is negative other than noted in the HPI             Medications:   Scheduled Medications    gabapentin  600 mg Oral BID     multivitamin w/minerals  1 tablet Oral Daily     pantoprazole  40 mg Oral BID AC     PRN Medications  acetaminophen, bisacodyl, melatonin, naloxone, ondansetron **OR** ondansetron, polyethylene glycol, prochlorperazine **OR** prochlorperazine **OR** prochlorperazine, zolpidem               Physical Exam:   Vitals were reviewed  Blood pressure 98/54, pulse 65, temperature 97.8  F (36.6  C), temperature source Oral, resp. rate 18, weight 111 kg (244 lb 11.4 oz), SpO2 94 %.  Wt Readings from Last 4 Encounters:   04/10/20 111 kg (244 lb 11.4 oz)   03/11/20 103.8 kg (228 lb 13.4 oz)   03/09/20 108.7 kg (239 lb 11.2 oz)   02/28/20 108.9 kg (240 lb)       I/O last 3 completed shifts:  In: 490 [P.O.:240; I.V.:250]  Out: 400 [Urine:400]    No exam. This was a phone visit.           Data:   All laboratory data and imaging studies reviewed.    CMP  Recent Labs   Lab 04/09/20  0612 04/06/20  0744 04/05/20  0654 04/04/20  0450    138 135 132*   POTASSIUM 4.2 3.8 3.7 3.7   CHLORIDE 107 107 103 102   CO2 26 25 28 26   ANIONGAP 4 6 4 4   GLC 88 93 113* 91   BUN 22 25 28 30   CR 1.30* 1.47* 1.64* 1.84*   GFRESTIMATED 53* 45* 40* 35*   GFRESTBLACK 61 53* 46* 40*   BENNIE 8.2* 8.1* 8.0* 8.0*   PROTTOTAL 5.4*  --   --   --    ALBUMIN 2.0*  --   --   --    BILITOTAL 0.7  --   --   --    ALKPHOS 57  --   --   --     AST 21  --   --   --    ALT 10  --   --   --      CBC  Recent Labs   Lab 04/10/20  0736 04/09/20  0612 04/08/20  1903 04/08/20  0756  04/06/20  0744  04/05/20  0654   WBC  --  4.1 4.4  --   --  4.3  --  4.0   RBC  --  2.81* 3.04*  --   --  2.81*  --  2.88*   HGB 6.9* 7.4* 8.0* 6.4*   < > 7.2*   < > 7.5*   HCT  --  24.1* 25.9*  --   --  23.1*  --  23.8*   MCV  --  86 85  --   --  82  --  83   MCH  --  26.3* 26.3*  --   --  25.6*  --  26.0*   MCHC  --  30.7* 30.9*  --   --  31.2*  --  31.5   RDW  --  20.3* 20.4*  --   --  19.8*  --  19.4*   PLT  --  144* 148*  --   --  148*  --  126*    < > = values in this interval not displayed.     INR  Recent Labs   Lab 04/09/20 0612 04/06/20  1742   INR 1.40* 1.34*

## 2020-04-10 NOTE — PROGRESS NOTES
GASTROENTEROLOGY PROGRESS NOTE    CC:  SUBJECTIVE:  Feels well this am. Thinks he has a bloody stool last night    OBJECTIVE:  General Appearance:  Pale awake alert NAD  /55 (BP Location: Left arm)   Pulse 72   Temp 98.5  F (36.9  C) (Oral)   Resp 16   Wt 111 kg (244 lb 11.4 oz)   SpO2 91%   BMI 31.42 kg/m    Temp (24hrs), Av.7  F (36.5  C), Min:97.3  F (36.3  C), Max:98.5  F (36.9  C)    Patient Vitals for the past 72 hrs:   Weight   04/10/20 0618 111 kg (244 lb 11.4 oz)   20 0603 110.9 kg (244 lb 7.8 oz)       Intake/Output Summary (Last 24 hours) at 4/10/2020 0908  Last data filed at 4/10/2020 0437  Gross per 24 hour   Intake 490 ml   Output 400 ml   Net 90 ml       PHYSICAL EXAM    Abd: S NT ND +bs        Additional Comments:  ROS, FH, SH: See initial GI consult for details.    I have reviewed the patient's new clinical lab results:    Recent Labs   Lab Test 04/10/20  0736 20  0612 20  1903  20  1742 20  0744  20  0636   WBC  --  4.1 4.4  --   --  4.3   < > 4.5   HGB 6.9* 7.4* 8.0*   < > 7.7* 7.2*   < > 7.3*   MCV  --  86 85  --   --  82   < > 79   PLT  --  144* 148*  --   --  148*   < > 129*   INR  --  1.40*  --   --  1.34*  --   --  2.29*    < > = values in this interval not displayed.     Recent Labs   Lab Test 20  0612 20  0744 20  0654   POTASSIUM 4.2 3.8 3.7   CHLORIDE 107 107 103   CO2 26 25 28   BUN 22 25 28   ANIONGAP 4 6 4     Recent Labs   Lab Test 20  0612 20  0636 20  1001  19  0220  19  1320 19  1635   ALBUMIN 2.0* 2.4* 2.6*   < >  --    < >  --   --    BILITOTAL 0.7 1.4* 0.7   < >  --    < >  --   --    ALT 10 11 13   < >  --    < >  --   --    AST 21 25 23   < >  --    < >  --   --    PROTEIN  --   --   --   --  10*  --  10* 10*    < > = values in this interval not displayed.         Active Problems:  Hematochezia: Still having ongoing intermittent bleeding, etiology unclear. Could be small  bowel or colon (only fair prep on colonoscopy)  -Pill cam to be picked up and read today. Hope to have some results later today  -Otherwise continue current therapy        Katherin Boyd MD  [facility]  Pager: [phone]  Office: [phone]

## 2020-04-10 NOTE — PLAN OF CARE
A/Ox4. VSS ex yelena at times. BS active,+flatus, + loose/bloody bm.  LS clear. Skin: abdominal lap WDL. CMS intact ex L BKA. Pain denies.. Up with 1, Stand\Pivot to bedside commode. Tolerating full liquid diet. Voiding to urinal.  Tele: A.fib CVR

## 2020-04-11 NOTE — PROVIDER NOTIFICATION
Text-paged Dr. Shaffer regarding patients hgb being 6.8 this am. Pt has conditional order to transfuse, do you want us to use that? Waiting for call back.

## 2020-04-11 NOTE — PROGRESS NOTES
GASTROENTEROLOGY PROGRESS NOTE     SUBJECTIVE:  He denies any overt bleeding.  Appetite is good, no complaints.    GI ROS: Denies current nausea, vomiting, abdominal pain, diarrhea, constipation, melena, or rectal bleeding.     OBJECTIVE:  BP 93/52 (BP Location: Right arm)   Pulse 67   Temp 98.3  F (36.8  C) (Oral)   Resp 16   Wt 104.9 kg (231 lb 4.2 oz)   SpO2 99%   BMI 29.69 kg/m    Temp (24hrs), Av.9  F (36.6  C), Min:97.5  F (36.4  C), Max:98.3  F (36.8  C)    Patient Vitals for the past 72 hrs:   Weight   20 0552 104.9 kg (231 lb 4.2 oz)   04/10/20 0618 111 kg (244 lb 11.4 oz)   20 0603 110.9 kg (244 lb 7.8 oz)       Intake/Output Summary (Last 24 hours) at 2020 1204  Last data filed at 2020 1000  Gross per 24 hour   Intake 980 ml   Output 550 ml   Net 430 ml        General Appearance: alert, oriented x3, no acute distress.  Eyes: PERRL, sclera anicteric.  GI: Soft, NABS, NT/ND.       Labs:     Recent Labs   Lab Test 20  0603 04/10/20  1824 04/10/20  0736 20  0612 20  1903  20  1742 20  0744  20  0636   WBC  --   --   --  4.1 4.4  --   --  4.3   < > 4.5   HGB 6.8* 7.6* 6.9* 7.4* 8.0*   < > 7.7* 7.2*   < > 7.3*   MCV  --   --   --  86 85  --   --  82   < > 79   PLT  --   --   --  144* 148*  --   --  148*   < > 129*   INR  --   --   --  1.40*  --   --  1.34*  --   --  2.29*    < > = values in this interval not displayed.     Recent Labs   Lab Test 20  0612 20  0744 20  0654   POTASSIUM 4.2 3.8 3.7   CHLORIDE 107 107 103   CO2 26 25 28   BUN 22 25 28   ANIONGAP 4 6 4     Recent Labs   Lab Test 20  0612 20  0636 20  1001  19  0220  19  1320 19  1635   ALBUMIN 2.0* 2.4* 2.6*   < >  --    < >  --   --    BILITOTAL 0.7 1.4* 0.7   < >  --    < >  --   --    ALT 10 11 13   < >  --    < >  --   --    AST 21 25 23   < >  --    < >  --   --    PROTEIN  --   --   --   --  10*  --  10* 10*    < > = values  in this interval not displayed.           Assessment and Plan: 75 year old male with obscure GI bleeding despite multiple investigations.  He is very frustrated by lack of a definitive answer.  He would like a second opinion.  I suggested the [facility], he would prefer [facility].  Discussed with Dr. Shaffer.    Tagged RBC scan if overt bleeding.  No additional recs at this time.    Benjamín Nowak MD

## 2020-04-11 NOTE — PROVIDER NOTIFICATION
Provider paged about hgb 6.8 to see if he wants transfusion.    Hospitalist stated to wait on transfusing due to shortage of blood and pt asymptomatic. Will wait for 12 hour recheck.

## 2020-04-11 NOTE — PROGRESS NOTES
[facility]    Oncology Progress Note    Date of Service (when I saw the patient): 04/11/2020     Assessment & Plan   Antonio Ramirez is a 75 year old male who was admitted on 3/31/2020.     APLA with history of saddle PE  - Followed by Dr. Raymond  - History of saddle PE 2007 with placement of IVC filter  - History of SDH s/p evacuation in 4/2018  - Has been on low dose rivaroxaban  - Now admitted with GI bleeding and off of DOAC therapy  - Repeat lupus anticoagulant test 4/8/20 positive (anticardiolipin antibodies and beta 2 glycoprotein negative)  - If source of bleeding can be identified and controlled, we can try to carefully resume low dose rivaroxaban. If bleeding site not discovered or controllable, then the risks of anticoagulation are likely too great and he will need to be off anticoagulation.  - continue off anticoagulation at present        Anemia  - acute exacerbation due to GI bleeding  - Has chronic anemia likely from liver disease, previous bleeding and underlying low grade lymphoproliferative disorder  - DOAC therapy has been discontinued this admission with acute bleeding  - No evidence of hemolysis with negative GISELL, haptoglobin, LDH, and blood smear  - Component of iron deficiency. Iron could be given IV to replace losses from GI bleeding.  - Continue close monitoring of blood counts  - Transfuse for hemoglobin < 6.0 (with attempt judicious use with low supply given COVID 19 pandemic), active bleeding or symptomatic anemia.    Thrombocytopenia  -mild and chronic  -most likely due to hypersplenism with cirrhosis and portal hypertension  -monitor    Elevated INR  - Likely from underlying liver disease  - 10 mg vitamin K given 3/31  - INR has improved, but not normalized    GI Bleeding  - Could be small bowel or colon (only fair prep on colonoscopy)  - Pill cam study negative  -Continue current management  -Tagged RBC scan if significant rebleed.  -Transfusions per hospitalist  service    Alcoholic Cirrhosis  - per hospitalist    Code Status: Full Code    Sandoval Fitzgerald    Interval History   Resting comfortably    Physical Exam   Temp: 98.3  F (36.8  C) Temp src: Oral BP: 93/52 Pulse: 67 Heart Rate: 66 Resp: 16 SpO2: 99 % O2 Device: None (Room air)    Vitals:    04/09/20 0603 04/10/20 0618 04/11/20 0552   Weight: 110.9 kg (244 lb 7.8 oz) 111 kg (244 lb 11.4 oz) 104.9 kg (231 lb 4.2 oz)     Vital Signs with Ranges  Temp:  [97.5  F (36.4  C)-98.3  F (36.8  C)] 98.3  F (36.8  C)  Pulse:  [60-67] 67  Heart Rate:  [58-66] 66  Resp:  [16-18] 16  BP: ()/(52-62) 93/52  SpO2:  [94 %-99 %] 99 %  I/O last 3 completed shifts:  In: 860 [P.O.:560]  Out: 550 [Urine:550]      Medications       gabapentin  600 mg Oral BID     multivitamin w/minerals  1 tablet Oral Daily     pantoprazole  40 mg Oral BID AC       Data   Results for orders placed or performed during the hospital encounter of 03/31/20 (from the past 24 hour(s))   Hemoglobin   Result Value Ref Range    Hemoglobin 7.6 (L) 13.3 - 17.7 g/dL   Hemoglobin   Result Value Ref Range    Hemoglobin 6.8 (LL) 13.3 - 17.7 g/dL

## 2020-04-11 NOTE — PLAN OF CARE
Pt alert and oriented. Vitally stable. Hgb 6.8 with q12hr checks (hospitalist stated not to transfuse unless symptomatic or hgb drops more). IV iron administered. Saline locked. Had scant bloody BM today. Ax1 Walker and GB. Voiding adequately. Clear lung sounds. Active bowel sounds. Denies pain. Tolerating regular diet. Plan to possibly discharge to [place] early this week.

## 2020-04-11 NOTE — PLAN OF CARE
A/Ox4. VSS. Tele afib with cvr. LS diminished. +BS, +flatus. Voiding. Patient refused to have second assessment and midnight vitals signs done. Pt stated if anything was wrong he would tell us. Pt slept throughout night. Hgb last night at 1800 was 7.6, waiting for am draw.

## 2020-04-11 NOTE — PROGRESS NOTES
"[facility]  Hospitalist Progress Note  William Shaffer MD  04/11/2020    Assessment & Plan   Antonio Ramirez is a 75 year old male admitted on 3/31/2020 with complicated medical hx as below on chronic anticoagulation who was admitted with acute on chronic anemia due to rectal bleeding.     1. Alcohol cirrhosis with persistent ascites, portal hypertensive gastropathy, and assumed coagulapathy.  Alcohol dependence.  Severe acute GI bleed  Has standing appointment for paracentesis every 2 weeks (last 3/23) since 2016. Consume 3-4 vodka drinks per day.  No desire to quit drinking at this point. INR 3.92 on adm and reversed with vitamin K.  -- continue to hold PTA spironolactone and torsemide with soft BP in setting rectal bleeding. Indicated sporatic use PTA.  -- discontinued CIWA   -- unclear etiology, see discussion below  --continue to transfuse for hemoglobin < 7 g/dl  -- discontinued xarelto, asa on hold  -- appreciate GI follow-up, I discussed with Dr. Boyd on 4/7  -- had pill cam 4/3; Pill cam read 4/7. Pill did not leave his stomach the 12 hours it was on so no helpful information gained.  --had enteroscopy 4/8, showing some portal gastropathy and stomach, otherwise unremarkable, no bleeding  --Patient had PillCam placed endoscopically 4/9  --patient had a maroon colored stool 04/10; also per Dr. Boyd, \"Still having ongoing intermittent bleeding, etiology unclear. Could be small bowel or colon (only fair prep on colonoscopy)  Plan:  - transfuse Hgb < 6.0 or if symptoms.  - Venofer infusion x 2.   - Continue Hgb checks Q12 hours  - Start process for transfer to [facility] if not improvement in hgb over next 1-2 days.  - Pill cam study results pending.     2. Acute on chronic anemia and chronic thrombocytopenia.   -- Chronic component related to ongoing alcohol use, cirrhosis, and CKD. Baseline hgb around 7.  -- 7 to 10 day history of rectal bleeding. Hgb 5.4 on adm with 5 units of PRBC " transfusion.  -- EGD did not show source of bleeding. Colonoscopy with red blood in terminal ileum throughout entire examined colon, one 10 mm polyp in the ascending colon (not removed), sigmoid diverticulosis, a non-bleeding rectal varix, and non-bleeding external hemorrhoids. Tagged RBC scan suggested bleed in duodenum which was felt inaccurate as EGD showed normal duodenum.  -- changed to po protonix  --transfused 1 unit of PRBC on 4/4, repeated unit 4/8  --Cont hold ASA 81mg daily.  --IV ceftriaxone 2 gram daily for SBP prophylaxis. Now stopped.   --continue to follow hgb  --Heme/Onc consult requested, I discussed with Patricio Jones and appreciate his evaluation and recommendations   --They recommend repeating A STEFANY studies including beta-2 glycoprotein, anticardiolipin antibodies, lupus inhibitors.  If these are negative, he could safely remain off anticoagulation.  If positive, and a source for bleeding can be identified and corrected, they recommend attempting to resume low-dose rivaroxaban. But at this time given bleeding site not discovered or controllable, the risks of anticoagulation are likely too great and he will need to be off anticoagulation.      3. CAD s/p 3v-CABG.  Ischemic cardiomyopathy with HFrEF.  Severe pulmonary hypertension.  Valvular disorder. Mild to moderate mitral regurgitation, mild mitral stenosis, mild tricuspid regurgitation, and aortic stenosis.  -- denies any chest pain or sob  -- holding asa    4. HTN, DLD.  -- not on any bp medications  -- just diuretics    5. Hx PEA arrest and VT arrest 6/2019.  Noncompliant in review of most recent cardiology note.  --Tele, I&Os, and daily weights.  --PTA ASA and diuretics on hold as above.     6. Persistent atrial fibrillation.  Patient stated that she goes in and out of atrial fibrillation.  He is compliant with daily Xarelto.  --discontinued Xarelto   --Hold aspirin as above.     7. Antiphospholipid antibody syndrome with hx PE s/p IVC  filter (2007).  --discontinued Xarelto as above.  --Hold aspirin as above.  --See discussion above, I appreciate heme/onc recommendations    8. CKD, stage III. Baseline Cr 1.5, currently Cr 1.47     9. Chronic neuropathy. Gabapentin.       10. Left BKA.     11. Hx prostate CA and large cell B-cell non-Hodgkin's lymphoma:  S/p radioactive seeding of the prostate.  Appears to be in remission.    --No interventions necessary at this point.      12.  Severe malnutrition  Malnutrition: (4/7)   % Weight Loss:  > 7.5% in 3 months (severe malnutrition)   % Intake:  </= 50% for >/= 5 days (severe malnutrition) - on average   Subcutaneous Fat Loss:  Unable   Muscle Loss:  Unable   Fluid Retention:  Moderate       Malnutrition Diagnosis: Severe malnutrition      Diet: 2 gram Na diet  Levin Catheter: not present  DVT Prophylaxis: Pneumatic Compression Devices  Code Status: Full Code confirmed with patient 4/2.     Expected discharge:  Likely in hospital for several more days, until hemoglobin stabilizes, may need transfer to [facility] per patient request if Hgb not improving.     William Shaffer MD  Hospitalist  [facility]  Text Page (7am - 6pm, M-F)      Interval History      Very frustrated about lack of solution to his decline in hemoglobin.  He denies abdominal pain today, and says he is discouraged by long hospital stay. Wishes for transfer to [facility], would like second opinion. No CP/SOB. No vomiting, no bloody stool today, no BM over past few days. No new symptoms.     -Data reviewed today: I reviewed all new labs and imaging over the last 24 hours. I personally reviewed no images or EKG's today.    Physical Exam   Heart Rate: 66, Blood pressure 93/52, pulse 67, temperature 98.3  F (36.8  C), temperature source Oral, resp. rate 16, weight 104.9 kg (231 lb 4.2 oz), SpO2 99 %.  Vitals:    04/09/20 0603 04/10/20 0618 04/11/20 0552   Weight: 110.9 kg (244 lb 7.8 oz) 111 kg (244 lb 11.4 oz) 104.9 kg (231 lb  4.2 oz)     Vital Signs with Ranges  Temp:  [97.5  F (36.4  C)-98.3  F (36.8  C)] 98.3  F (36.8  C)  Pulse:  [60-67] 67  Heart Rate:  [58-66] 66  Resp:  [16-18] 16  BP: ()/(52-62) 93/52  SpO2:  [96 %-99 %] 99 %  I/O's Last 24 hours  I/O last 3 completed shifts:  In: 860 [P.O.:560]  Out: 550 [Urine:550]    Constitutional: Awake, alert  Respiratory: Clear to auscultation bilaterally, no crackles or wheezing  Cardiovascular: Regular rate and rhythm, normal S1 and S2, + murmur noted  GI: Normal bowel sounds, soft, distended, firm and non-tender  Skin/Integumen: No rashes, no cyanosis, + edema, L BKA, brusing  Other: Mood is calm at the time of our interview    Medications   All medications were reviewed.      gabapentin  600 mg Oral BID     iron sucrose (VENOFER) intermittent infusion  300 mg Intravenous Daily     multivitamin w/minerals  1 tablet Oral Daily     pantoprazole  40 mg Oral BID AC        Data   Recent Labs   Lab 04/11/20  0603 04/10/20  1824 04/10/20  0736 04/09/20  0612 04/08/20  1903  04/06/20  1742 04/06/20  0744  04/05/20  0654   WBC  --   --   --  4.1 4.4  --   --  4.3  --  4.0   HGB 6.8* 7.6* 6.9* 7.4* 8.0*   < > 7.7* 7.2*   < > 7.5*   MCV  --   --   --  86 85  --   --  82  --  83   PLT  --   --   --  144* 148*  --   --  148*  --  126*   INR  --   --   --  1.40*  --   --  1.34*  --   --   --    NA  --   --   --  137  --   --   --  138  --  135   POTASSIUM  --   --   --  4.2  --   --   --  3.8  --  3.7   CHLORIDE  --   --   --  107  --   --   --  107  --  103   CO2  --   --   --  26  --   --   --  25  --  28   BUN  --   --   --  22  --   --   --  25  --  28   CR  --   --   --  1.30*  --   --   --  1.47*  --  1.64*   ANIONGAP  --   --   --  4  --   --   --  6  --  4   BENNIE  --   --   --  8.2*  --   --   --  8.1*  --  8.0*   GLC  --   --   --  88  --   --   --  93  --  113*   ALBUMIN  --   --   --  2.0*  --   --   --   --   --   --    PROTTOTAL  --   --   --  5.4*  --   --   --   --   --   --     BILITOTAL  --   --   --  0.7  --   --   --   --   --   --    ALKPHOS  --   --   --  57  --   --   --   --   --   --    ALT  --   --   --  10  --   --   --   --   --   --    AST  --   --   --  21  --   --   --   --   --   --     < > = values in this interval not displayed.       No results found for this or any previous visit (from the past 24 hour(s)).

## 2020-04-12 NOTE — PROGRESS NOTES
"[facility]  Hospitalist Progress Note  William Shaffer MD  04/12/2020    Assessment & Plan   Antonio Ramirez is a 75 year old male admitted on 3/31/2020 with complicated medical hx as below on chronic anticoagulation who was admitted with acute on chronic anemia due to rectal bleeding.     1. Alcohol cirrhosis with persistent ascites, portal hypertensive gastropathy, and assumed coagulapathy.  Alcohol dependence.  Severe acute GI bleed  Has standing appointment for paracentesis every 2 weeks (last 3/23) since 2016. Consume 3-4 vodka drinks per day.  No desire to quit drinking at this point. INR 3.92 on adm and reversed with vitamin K.  -- continue to hold PTA spironolactone and torsemide with soft BP in setting rectal bleeding. Indicated sporatic use PTA.  -- discontinued CIWA   -- unclear etiology, see discussion below  --continue to transfuse for hemoglobin < 7 g/dl  -- discontinued xarelto, asa on hold  -- appreciate GI follow-up, I discussed with Dr. Boyd on 4/7  -- had pill cam 4/3; Pill cam read 4/7. Pill did not leave his stomach the 12 hours it was on so no helpful information gained.  --had enteroscopy 4/8, showing some portal gastropathy and stomach, otherwise unremarkable, no bleeding  -- Tagged RBC scan was positive but CTA felt not worth risk of renal failure  --Patient had PillCam placed endoscopically 4/9  --patient had a maroon colored stool 04/10; also per Dr. Boyd, \"Still having ongoing intermittent bleeding, etiology unclear. Could be small bowel or colon (only fair prep on colonoscopy)  Plan:  - transfuse Hgb < 6.0 or if symptoms.  - Venofer infusion x 2.   - Continue Hgb checks daily  - patient okay with not transferring to [place] as long as Hgb stable and we feel discharge safe. Discussed with [place] 04/11, possible intervention left is CTA, which could be done at [facility] but patient's CKD prohibits this.   - Pill cam study results pending.     2. Acute on chronic anemia and " chronic thrombocytopenia.   -- Chronic component related to ongoing alcohol use, cirrhosis, and CKD. Baseline hgb around 7.  -- 7 to 10 day history of rectal bleeding. Hgb 5.4 on adm with 5 units of PRBC transfusion.  -- EGD did not show source of bleeding. Colonoscopy with red blood in terminal ileum throughout entire examined colon, one 10 mm polyp in the ascending colon (not removed), sigmoid diverticulosis, a non-bleeding rectal varix, and non-bleeding external hemorrhoids. Tagged RBC scan suggested bleed in duodenum which was felt inaccurate as EGD showed normal duodenum.  -- changed to po protonix  --transfused 1 unit of PRBC on 4/4, repeated unit 4/8  --Cont hold ASA 81mg daily.  --IV ceftriaxone 2 gram daily for SBP prophylaxis. Now stopped.   --Heme/Onc consult requested, I discussed with Patricio Jones and appreciate his evaluation and recommendations   --A STEFANY studies including beta-2 glycoprotein, anticardiolipin antibodies WAS NEGATIVE and lupus inhibitor was psoitive.  But at this time given bleeding site not discovered, the risks of anticoagulation are likely too great and he will need to be off anticoagulation.      3. CAD s/p 3v-CABG.  Ischemic cardiomyopathy with HFrEF.  Severe pulmonary hypertension.  Valvular disorder. Mild to moderate mitral regurgitation, mild mitral stenosis, mild tricuspid regurgitation, and aortic stenosis.  -- denies any chest pain or sob  Plan:  -- Check ECHO for possible AORTIC STENOSIS causing anemia?  -- holding asa    4. HTN, DLD.  -- not on any bp medications  -- just diuretics, can resume in AM if Hgb stable    5. Hx PEA arrest and VT arrest 6/2019.  Noncompliant in review of most recent cardiology note.  --Tele, I&Os, and daily weights.  --PTA ASA and diuretics on hold as above.     6. Persistent atrial fibrillation.  Patient stated that she goes in and out of atrial fibrillation.  He is compliant with daily Xarelto.  --discontinued Xarelto   --Hold aspirin as  above.     7. Antiphospholipid antibody syndrome with hx PE s/p IVC filter (2007).  --discontinued Xarelto as above.  --Hold aspirin as above.  --See discussion above, I appreciate heme/onc recommendations    8. CKD, stage III. Baseline Cr 1.5, currently Cr 1.47     9. Chronic neuropathy. Gabapentin.       10. Left BKA.     11. Hx prostate CA and large cell B-cell non-Hodgkin's lymphoma:  S/p radioactive seeding of the prostate.  Appears to be in remission.    --No interventions necessary at this point.      12.  Severe malnutrition  Malnutrition: (4/7)   % Weight Loss:  > 7.5% in 3 months (severe malnutrition)   % Intake:  </= 50% for >/= 5 days (severe malnutrition) - on average   Subcutaneous Fat Loss:  Unable   Muscle Loss:  Unable   Fluid Retention:  Moderate       Malnutrition Diagnosis: Severe malnutrition      Diet: 2 gram Na diet  Levin Catheter: not present  DVT Prophylaxis: Pneumatic Compression Devices  Code Status: Full Code confirmed with patient 4/2.     Expected discharge:  Likely in hospital for 1-2 more days, until hemoglobin stabilizes.    William Shaffer MD  Hospitalist  [facility]  Text Page (7am - 6pm, M-F)      Interval History      No acute events overnight  Had some bright red blood in stool this AM  He denies abdominal pain today.  No CP/SOB. No vomiting, no bloody stool today, no BM over past few days. No new symptoms.     -Data reviewed today: I reviewed all new labs and imaging over the last 24 hours. I personally reviewed no images or EKG's today.    Physical Exam   Heart Rate: 59, Blood pressure 104/53, pulse 58, temperature 97.7  F (36.5  C), temperature source Oral, resp. rate 16, weight 102.9 kg (226 lb 13.7 oz), SpO2 98 %.  Vitals:    04/10/20 0618 04/11/20 0552 04/12/20 0515   Weight: 111 kg (244 lb 11.4 oz) 104.9 kg (231 lb 4.2 oz) 102.9 kg (226 lb 13.7 oz)     Vital Signs with Ranges  Temp:  [97.7  F (36.5  C)-98.3  F (36.8  C)] 97.7  F (36.5  C)  Pulse:  [57-93]  58  Heart Rate:  [59-67] 59  Resp:  [16] 16  BP: (100-108)/(53-59) 104/53  SpO2:  [93 %-98 %] 98 %  I/O's Last 24 hours  I/O last 3 completed shifts:  In: 480 [P.O.:480]  Out: 790 [Urine:790]    Constitutional: Awake, alert  Respiratory: Clear to auscultation bilaterally, no crackles or wheezing  Cardiovascular: Regular rate and rhythm, normal S1 and S2, + murmur noted  GI: Normal bowel sounds, soft, distended, firm and non-tender  Skin/Integumen: No rashes, no cyanosis, + edema, L BKA, brusing  Other: Mood is calm at the time of our interview    Medications   All medications were reviewed.      gabapentin  600 mg Oral BID     multivitamin w/minerals  1 tablet Oral Daily     pantoprazole  40 mg Oral BID AC        Data   Recent Labs   Lab 04/12/20  0603 04/11/20  1814 04/11/20  0603  04/09/20  0612 04/08/20  1903  04/06/20  1742 04/06/20  0744   WBC 4.0  --   --   --  4.1 4.4  --   --  4.3   HGB 6.9* 7.0* 6.8*   < > 7.4* 8.0*   < > 7.7* 7.2*   MCV 87  --   --   --  86 85  --   --  82     --   --   --  144* 148*  --   --  148*   INR  --   --   --   --  1.40*  --   --  1.34*  --    NA  --   --   --   --  137  --   --   --  138   POTASSIUM  --   --   --   --  4.2  --   --   --  3.8   CHLORIDE  --   --   --   --  107  --   --   --  107   CO2  --   --   --   --  26  --   --   --  25   BUN  --   --   --   --  22  --   --   --  25   CR  --   --   --   --  1.30*  --   --   --  1.47*   ANIONGAP  --   --   --   --  4  --   --   --  6   BENNIE  --   --   --   --  8.2*  --   --   --  8.1*   GLC  --   --   --   --  88  --   --   --  93   ALBUMIN  --   --   --   --  2.0*  --   --   --   --    PROTTOTAL  --   --   --   --  5.4*  --   --   --   --    BILITOTAL  --   --   --   --  0.7  --   --   --   --    ALKPHOS  --   --   --   --  57  --   --   --   --    ALT  --   --   --   --  10  --   --   --   --    AST  --   --   --   --  21  --   --   --   --     < > = values in this interval not displayed.       No results found for  this or any previous visit (from the past 24 hour(s)).

## 2020-04-12 NOTE — PROGRESS NOTES
[facility]    Oncology Progress Note    Date of Service (when I saw the patient): 04/12/2020     Assessment & Plan   Antonio Ramirez is a 75 year old male who was admitted on 3/31/2020.     APLA with history of saddle PE  - Followed by Dr. Raymond  - History of saddle PE 2007 with placement of IVC filter  - History of SDH s/p evacuation in 4/2018  - Has been on low dose rivaroxaban  - Now admitted with GI bleeding and off of DOAC therapy  - Repeat lupus anticoagulant test 4/8/20 positive (anticardiolipin antibodies and beta 2 glycoprotein negative)  - If source of bleeding can be identified and controlled, we can try to carefully resume low dose rivaroxaban. If bleeding site not discovered or controllable, then the risks of anticoagulation are likely too great and he will need to be off anticoagulation.  - continue off anticoagulation at present        Anemia  - acute exacerbation due to GI bleeding  - Has chronic anemia likely from liver disease, previous bleeding and underlying low grade lymphoproliferative disorder  - DOAC therapy has been discontinued this admission with acute bleeding  - No evidence of hemolysis with negative GISELL, haptoglobin, LDH, and blood smear  - Component of iron deficiency. Iron could be given IV to replace losses from GI bleeding.  - Continue close monitoring of blood counts  - Transfuse for hemoglobin < 6.0 (with attempt judicious use with low supply given COVID 19 pandemic), active bleeding or symptomatic anemia.    Thrombocytopenia  -mild and chronic  -most likely due to hypersplenism with cirrhosis and portal hypertension  -monitor    Elevated INR  - Likely from underlying liver disease  - 10 mg vitamin K given 3/31  - INR has improved, but not normalized    GI Bleeding  - Could be small bowel or colon (only fair prep on colonoscopy)  - Pill cam study negative  -Continue current management  -Tagged RBC scan if significant rebleed.  -Transfusions per hospitalist  service    Alcoholic Cirrhosis  - per hospitalist    Code Status: Full Code    Sandoval Fitzgerald    Interval History   No new symptoms    Physical Exam   Temp: 98.3  F (36.8  C) Temp src: Oral BP: 103/58 Pulse: 93 Heart Rate: 67 Resp: 16 SpO2: 93 % O2 Device: None (Room air)    Vitals:    04/10/20 0618 04/11/20 0552 04/12/20 0515   Weight: 111 kg (244 lb 11.4 oz) 104.9 kg (231 lb 4.2 oz) 102.9 kg (226 lb 13.7 oz)     Vital Signs with Ranges  Temp:  [97.9  F (36.6  C)-98.8  F (37.1  C)] 98.3  F (36.8  C)  Pulse:  [57-93] 93  Heart Rate:  [59-68] 67  Resp:  [16] 16  BP: (103-110)/(58-62) 103/58  SpO2:  [93 %-94 %] 93 %  I/O last 3 completed shifts:  In: 960 [P.O.:960]  Out: 640 [Urine:640]      Medications       gabapentin  600 mg Oral BID     multivitamin w/minerals  1 tablet Oral Daily     pantoprazole  40 mg Oral BID AC       Data   Results for orders placed or performed during the hospital encounter of 03/31/20 (from the past 24 hour(s))   Hemoglobin   Result Value Ref Range    Hemoglobin 7.0 (L) 13.3 - 17.7 g/dL   Vitamin B12   Result Value Ref Range    Vitamin B12 1,183 (H) 193 - 986 pg/mL   Folate   Result Value Ref Range    Folate 11.3 >5.4 ng/mL   CBC with platelets   Result Value Ref Range    WBC 4.0 4.0 - 11.0 10e9/L    RBC Count 2.53 (L) 4.4 - 5.9 10e12/L    Hemoglobin 6.9 (LL) 13.3 - 17.7 g/dL    Hematocrit 22.0 (L) 40.0 - 53.0 %    MCV 87 78 - 100 fl    MCH 27.3 26.5 - 33.0 pg    MCHC 31.4 (L) 31.5 - 36.5 g/dL    RDW 19.6 (H) 10.0 - 15.0 %    Platelet Count 153 150 - 450 10e9/L

## 2020-04-12 NOTE — PROGRESS NOTES
GI chart check    Hgb stable, no overt bleeding.    No new recs at this time.  Tagged RBC scan if overt bleeding.    Benjamín Nowak MD

## 2020-04-13 NOTE — PLAN OF CARE
A&Ox4. Hbg recheck came back @ 6.8. up with one GB POPEYEW, CHEMA BRONSON. NPO at midnight for angio in AM. Denies pain.

## 2020-04-13 NOTE — PLAN OF CARE
Pt A+Ox4, VSS. Pt very frustrated by pain assessment process and is not experiencing any pain. Pt voiced frustration about the lack of continuity of care between providers and concern over providers not working together when formulating his plan of care. Pt refused midnight assessment but allowed a 0400 blood pressure for as long as I did it quietly. Discharge plan dependent on hemoglobin stabilization. Voiding adequately.

## 2020-04-13 NOTE — PLAN OF CARE
Patient A&Ox4.  Up with 1 assistance with gait belt/walker.  VSS, except BP soft at times 90's-100's/50-60's.  Patient denies dizziness/lightheadedness.  Denies pain.  NM scan today +bleeding.  Plan for Angiogram today/or tomorrow.  Hemoglobin 6.4, recheck level ordered for 1700.  Patient had 1 stool today with blood.  NPO for possible Angiogram later today. Left BKA (prothesis) Continue to monitor.

## 2020-04-13 NOTE — PLAN OF CARE
A&Ox4. VSS on RA. LS clear. BS active, passing flatus, small bright bloody stool. Hbg stable. Bilateral lower extremity edema. Left BKA. Refused to get up to chair. Up with 1-2 to bedside commode. Voiding per urinal.

## 2020-04-13 NOTE — PROGRESS NOTES
"[facility]  Hospitalist Progress Note  Solomon Tucker MD  04/13/2020    Assessment & Plan   Antonio Ramirez is a 75 year old male admitted on 3/31/2020 with complicated medical hx as below on chronic anticoagulation who was admitted with acute on chronic anemia due to rectal bleeding.     1. Alcohol cirrhosis with persistent ascites, portal hypertensive gastropathy, and assumed coagulapathy.  Alcohol dependence.  Severe acute GI bleed- suspecting small bowel AVM  Has standing appointment for paracentesis every 2 weeks (last 3/23) since 2016. Consume 3-4 vodka drinks per day.  No desire to quit drinking at this point. INR 3.92 on adm and reversed with vitamin K.  -- continue to hold PTA spironolactone and torsemide with soft BP in setting rectal bleeding. Indicated sporatic use PTA.  -- discontinued CIWA   -- unclear etiology, see discussion below  --continue to transfuse for hemoglobin < 7 g/dl  -- discontinued xarelto, asa on hold  -- appreciate GI follow-up, I discussed with Dr. Boyd on 4/7  -- had pill cam 4/3; Pill cam read 4/7. Pill did not leave his stomach the 12 hours it was on so no helpful information gained.  --had enteroscopy 4/8, showing some portal gastropathy and stomach, otherwise unremarkable, no bleeding  -- Tagged RBC scan was positive but CTA felt not worth risk of renal failure  --Patient had PillCam placed endoscopically 4/9  --patient had a maroon colored stool 04/10; also per Dr. Boyd, \"Still having ongoing intermittent bleeding, etiology unclear. Could be small bowel or colon (only fair prep on colonoscopy)  Plan:  - transfuse Hgb < 6.0 or if symptoms.  - Venofer infusion x 2.   - Continue Hgb checks daily, hgb drifting down again 6.9 >> 6.4  - patient okay with not transferring to [place] as long as Hgb stable and we feel discharge safe. Discussed with [place] 04/11, possible intervention left is CTA, which could be done at [facility] but patient's CKD prohibits this.   - " repeating tagged rbc scan today, may repeat pill cam study again.    2. Acute on chronic anemia and chronic thrombocytopenia.   -- Chronic component related to ongoing alcohol use, cirrhosis, and CKD. Baseline hgb around 7.  -- 7 to 10 day history of rectal bleeding. Hgb 5.4 on adm with 5 units of PRBC transfusion.  -- EGD did not show source of bleeding. Colonoscopy with red blood in terminal ileum throughout entire examined colon, one 10 mm polyp in the ascending colon (not removed), sigmoid diverticulosis, a non-bleeding rectal varix, and non-bleeding external hemorrhoids. Tagged RBC scan suggested bleed in duodenum which was felt inaccurate as EGD showed normal duodenum.  -- changed to po protonix  --transfused 1 unit of PRBC on 4/4, repeated unit 4/8  --Cont hold ASA 81mg daily.  --IV ceftriaxone 2 gram daily for SBP prophylaxis. Now stopped.   --Heme/Onc consult requested, I discussed with Patricio Jones and appreciate his evaluation and recommendations   --A STEFANY studies including beta-2 glycoprotein, anticardiolipin antibodies WAS NEGATIVE and lupus inhibitor was psoitive.  But at this time given bleeding site not discovered, the risks of anticoagulation are likely too great and he will need to be off anticoagulation.      3. CAD s/p 3v-CABG.  Ischemic cardiomyopathy with HFrEF.  Severe pulmonary hypertension.  Valvular disorder. Mild to moderate mitral regurgitation, mild mitral stenosis, mild tricuspid regurgitation, and aortic stenosis.  -- denies any chest pain or sob  Plan:  -- holding asa     4. HTN, DLD.  -- not on any bp medications  -- just diuretics      5. Hx PEA arrest and VT arrest 6/2019.  Noncompliant in review of most recent cardiology note.  --Tele, I&Os, and daily weights.  --PTA ASA and diuretics on hold as above.     6. Persistent atrial fibrillation.  Patient stated that she goes in and out of atrial fibrillation.  He is compliant with daily Xarelto.  --discontinued Xarelto   --Hold  aspirin as above.     7. Antiphospholipid antibody syndrome with hx PE s/p IVC filter (2007).  --discontinued Xarelto as above.  --Hold aspirin as above.  --See discussion above, I appreciate heme/onc recommendations     8. CKD, stage III. Baseline Cr 1.5, currently Cr 1.30  -- will repeat     9. Chronic neuropathy. Gabapentin.       10. Left BKA.     11. Hx prostate CA and large cell B-cell non-Hodgkin's lymphoma:  S/p radioactive seeding of the prostate.  Appears to be in remission.    --No interventions necessary at this point.       12.  Severe malnutrition  Malnutrition: (4/7)   % Weight Loss:  > 7.5% in 3 months (severe malnutrition)   % Intake:  </= 50% for >/= 5 days (severe malnutrition) - on average   Subcutaneous Fat Loss:  Unable   Muscle Loss:  Unable   Fluid Retention:  Moderate       Malnutrition Diagnosis: Severe malnutrition      Diet: 2 gram Na diet  Levin Catheter: not present  DVT Prophylaxis: Pneumatic Compression Devices  Code Status: Full Code confirmed with patient 4/2.     Expected discharge:  Likely in hospital for 1-2 more days, until hemoglobin stabilizes.    Interval History   -- chart reviewed  -- noted hgb drift down  -- hem and gi consult noted    -Data reviewed today: I reviewed all new labs and imaging over the last 24 hours. I personally reviewed no images or EKG's today.    Physical Exam   Heart Rate: 66, Blood pressure 108/57, pulse 67, temperature 97.8  F (36.6  C), temperature source Oral, resp. rate 16, weight 102.8 kg (226 lb 10.1 oz), SpO2 96 %.  Vitals:    04/11/20 0552 04/12/20 0515 04/13/20 0535   Weight: 104.9 kg (231 lb 4.2 oz) 102.9 kg (226 lb 13.7 oz) 102.8 kg (226 lb 10.1 oz)     Vital Signs with Ranges  Temp:  [97.7  F (36.5  C)-98.2  F (36.8  C)] 97.8  F (36.6  C)  Pulse:  [58-70] 67  Heart Rate:  [55-66] 66  Resp:  [16] 16  BP: (104-123)/(53-58) 108/57  SpO2:  [96 %-98 %] 96 %  I/O's Last 24 hours  I/O last 3 completed shifts:  In: -   Out: 620  [Urine:620]    Constitutional: Awake, alert, cooperative, no apparent distress  Respiratory: Clear to auscultation bilaterally, no crackles or wheezing  Cardiovascular: Regular rate and rhythm, normal S1 and S2, and no murmur noted  GI: Normal bowel sounds, soft, non-distended, non-tender  Skin/Integumen: No rashes, no cyanosis, no edema  Other:      Medications   All medications were reviewed.      gabapentin  600 mg Oral BID     multivitamin w/minerals  1 tablet Oral Daily     pantoprazole  40 mg Oral BID AC     sodium chloride (PF)  3 mL Intracatheter Q8H        Data   Recent Labs   Lab 04/13/20  0733 04/12/20  0603 04/11/20  1814  04/09/20  0612  04/06/20  1742   WBC 4.5 4.0  --   --  4.1   < >  --    HGB 6.4* 6.9* 7.0*   < > 7.4*   < > 7.7*   MCV 87 87  --   --  86   < >  --    * 153  --   --  144*   < >  --    INR  --   --   --   --  1.40*  --  1.34*   NA  --   --   --   --  137  --   --    POTASSIUM  --   --   --   --  4.2  --   --    CHLORIDE  --   --   --   --  107  --   --    CO2  --   --   --   --  26  --   --    BUN  --   --   --   --  22  --   --    CR  --   --   --   --  1.30*  --   --    ANIONGAP  --   --   --   --  4  --   --    BENNIE  --   --   --   --  8.2*  --   --    GLC  --   --   --   --  88  --   --    ALBUMIN  --   --   --   --  2.0*  --   --    PROTTOTAL  --   --   --   --  5.4*  --   --    BILITOTAL  --   --   --   --  0.7  --   --    ALKPHOS  --   --   --   --  57  --   --    ALT  --   --   --   --  10  --   --    AST  --   --   --   --  21  --   --     < > = values in this interval not displayed.       No results found for this or any previous visit (from the past 24 hour(s)).    Solomon Tucker MD  Text Page  (7am to 6pm)

## 2020-04-13 NOTE — PROGRESS NOTES
CLINICAL NUTRITION SERVICES - REASSESSMENT NOTE    Recommendations Ordered by Registered Dietitian (RD):   - Continue Thera vit M daily   - Continue Boost Plus BID for now.   Malnutrition: ()  % Weight Loss:  > 7.5% in 3 months (severe malnutrition)  % Intake:  </= 50% for >/= 5 days (severe malnutrition) - on average  Subcutaneous Fat Loss:  Unable  Muscle Loss:  Unable  Fluid Retention:  Moderate     Malnutrition Diagnosis: Severe malnutrition  In Context of:  Acute illness or injury  Chronic illness or disease     EVALUATION OF PROGRESS TOWARD GOALS   Diet: Regular + Boost Plus BID between meals   Intake/Tolerance:   - Last meal ordered was breakfast yesterday (Ukrainian toast, sausage, banana bread, apple sauce, coffee) 100% intake recorded.   - Flowsheets show that patient eats % of meals ordered.     - Per meal review, pt has been ordering the following ->    - 1660 kcal, 50 g pro (1 meal, 2 supplements)    - 2400 kcal, 66 g pro (2 meals, 2 supplements)  4/10 - 2360 kcal, 67 g pro (3 meals, 2 supplements)   - 566 kcal, 5 g pro (1 meal)   (Avg 1750 kcal, 50 g pro daily)   Even if patient eats 100% he will only meet ~78% energy, <50% protein needs daily.     - Attempted to call into room per current departmental policy during COVID-19 Pandemic, however unable to reach patient.     ASSESSED NUTRITION NEEDS:  Dosing Weight 86 kg - adjusted for overweight  Estimated Energy Needs: 6482-0131 kcals (25-30 Kcal/Kg)  Justification: maintenance  Estimated Protein Needs: 103-129 grams protein (1.2-1.5 g pro/Kg)  Justification: preservation of lean body mass  Estimated Fluid Needs: 1 mL/kcal  Justification: maintenance    NEW FINDINGS:   - Weight trendin.8 kg today. ~4# up from admission.   - Labs reviewed   - 2+ Mild Edema  - Last BM  (dark red)    Previous Goals:   Intake of at least 75% meals TID.   Evaluation: Not met - pt does not order TID most days.    Previous Nutrition Diagnosis:    Inadequate oral intake related to altered GI function with GIB as evidenced by intakes meeting <50% est needs on average x7 day admission, with up to an 11% weight loss in 3 months.   Evaluation: Improving slightly - continues below w/ update.     CURRENT NUTRITION DIAGNOSIS  Inadequate oral intake related to altered GI function with GIB as evidenced by intakes meeting <75% est needs on average x13 day admission, with up to an 11% weight loss in 3 months.    INTERVENTIONS  Recommendations / Nutrition Prescription  Continue diet as ordered    Continue Boost Plus BID + Thera Vit M daily     Implementation  None new today    Goals  Intake of at least 75% meals BID during review period.       MONITORING AND EVALUATION:  Progress towards goals will be monitored and evaluated per protocol and Practice Guidelines    Narda Armijo RD, LD  Pager: [phone]

## 2020-04-13 NOTE — PROGRESS NOTES
GASTROENTEROLOGY PROGRESS NOTE    CC:     SUBJECTIVE:  Feels well, no GI complaints    OBJECTIVE:  General Appearance:  Pale, awake alert NAD  /57 (BP Location: Right arm)   Pulse 67   Temp 97.8  F (36.6  C) (Oral)   Resp 16   Wt 102.8 kg (226 lb 10.1 oz)   SpO2 96%   BMI 29.10 kg/m    Temp (24hrs), Av.9  F (36.6  C), Min:97.7  F (36.5  C), Max:98.2  F (36.8  C)    Patient Vitals for the past 72 hrs:   Weight   20 0535 102.8 kg (226 lb 10.1 oz)   20 0515 102.9 kg (226 lb 13.7 oz)   20 0552 104.9 kg (231 lb 4.2 oz)       Intake/Output Summary (Last 24 hours) at 2020 1020  Last data filed at 2020 0555  Gross per 24 hour   Intake --   Output 470 ml   Net -470 ml       PHYSICAL EXAM    Abd: S NT ND +bs        Additional Comments:  ROS, FH, SH: See initial GI consult for details.    I have reviewed the patient's new clinical lab results:    Recent Labs   Lab Test 20  0733 20  0603 20  1814  20  0612  20  1742  20  0636   WBC 4.5 4.0  --   --  4.1   < >  --    < > 4.5   HGB 6.4* 6.9* 7.0*   < > 7.4*   < > 7.7*   < > 7.3*   MCV 87 87  --   --  86   < >  --    < > 79   * 153  --   --  144*   < >  --    < > 129*   INR  --   --   --   --  1.40*  --  1.34*  --  2.29*    < > = values in this interval not displayed.     Recent Labs   Lab Test 20  0612 20  0744 20  0654   POTASSIUM 4.2 3.8 3.7   CHLORIDE 107 107 103   CO2 26 25 28   BUN 22 25 28   ANIONGAP 4 6 4     Recent Labs   Lab Test 20  0612 20  0636 20  1001  19  0220  19  1320 19  1635   ALBUMIN 2.0* 2.4* 2.6*   < >  --    < >  --   --    BILITOTAL 0.7 1.4* 0.7   < >  --    < >  --   --    ALT 10 11 13   < >  --    < >  --   --    AST 21 25 23   < >  --    < >  --   --    PROTEIN  --   --   --   --  10*  --  10* 10*    < > = values in this interval not displayed.         Active Problems:   Hematochezia: Suspect distal small bowel  source such as AVM. However endoscopy procedures/pill cam have not showed source of bleed. Likely due to intermittently bleeding. Discussed with retrograde double balloon enteroscopy with Dr. Heard who does not think it would be helpful without a specific leion to target. We are at the point of repeating previously done studies to try to find lesion. Discussed at length with pt who is understandably frustrated  -Tagged RBC scan today  -If negative consider repeat pill cam, will need to be endoscopically placed  -Transfusions per hospitalist  -Continue PPI    Katherin Boyd MD  [facility]  Pager: [phone]  Office: [phone]

## 2020-04-13 NOTE — PROGRESS NOTES
[facility]    Oncology Progress Note    Date of Service (when I saw the patient): 04/13/2020     Assessment & Plan   Antonio Ramirez is a 75 year old male who was admitted on 3/31/2020.     APLA with history of saddle PE  - History of saddle PE 2007 with placement of IVC filter  - History of SDH s/p evacuation in 4/2018  - Has been on low dose rivaroxaban  - Now admitted with GI bleeding and off of DOAC therapy  - Repeat lupus anticoagulant test 4/8/20 positive (anticardiolipin antibodies and beta 2 glycoprotein negative)  - If source of bleeding can be identified and controlled, we can try to carefully resume low dose rivaroxaban. If bleeding site not discovered or controllable, then the risks of anticoagulation are likely too great and he will need to be off anticoagulation.  - continue off anticoagulation at present        Anemia  - acute exacerbation due to GI bleeding  - Has chronic anemia likely from liver disease, previous bleeding and underlying low grade lymphoproliferative disorder  - DOAC therapy has been discontinued this admission with acute bleeding  - No evidence of hemolysis with negative GISELL, haptoglobin, LDH, and blood smear  - Component of iron deficiency. Iron could be given IV to replace losses from GI bleeding.  - Continue close monitoring of blood counts  - Transfuse for hemoglobin < 6.0 (with attempt judicious use with low supply given COVID 19 pandemic), active bleeding or symptomatic anemia.    Thrombocytopenia  -mild and chronic  -most likely due to hypersplenism with cirrhosis and portal hypertension  -monitor    Elevated INR  - Likely from underlying liver disease  - 10 mg vitamin K given 3/31  - INR has improved, but not normalized    GI Bleeding  - Could be small bowel or colon (only fair prep on colonoscopy)  - Pill cam study negative  -Continue current management  -Tagged RBC scan if significant rebleed.  -Transfusions per hospitalist service    Alcoholic  Cirrhosis  - per hospitalist    Code Status: Full Code    Zhanna Raymond    Interval History   Dark stools, getting nuclear test today, feels well otherwise    Physical Exam   Temp: 97.8  F (36.6  C) Temp src: Oral BP: 108/57 Pulse: 67 Heart Rate: 66 Resp: 16 SpO2: 96 % O2 Device: None (Room air)    Vitals:    04/11/20 0552 04/12/20 0515 04/13/20 0535   Weight: 104.9 kg (231 lb 4.2 oz) 102.9 kg (226 lb 13.7 oz) 102.8 kg (226 lb 10.1 oz)     Vital Signs with Ranges  Temp:  [97.7  F (36.5  C)-98.2  F (36.8  C)] 97.8  F (36.6  C)  Pulse:  [58-70] 67  Heart Rate:  [55-66] 66  Resp:  [16] 16  BP: (104-123)/(53-58) 108/57  SpO2:  [96 %-98 %] 96 %  I/O last 3 completed shifts:  In: -   Out: 620 [Urine:620]      Medications       gabapentin  600 mg Oral BID     multivitamin w/minerals  1 tablet Oral Daily     pantoprazole  40 mg Oral BID AC     sodium chloride (PF)  3 mL Intracatheter Q8H       Data   Results for orders placed or performed during the hospital encounter of 03/31/20 (from the past 24 hour(s))   CBC with platelets   Result Value Ref Range    WBC 4.5 4.0 - 11.0 10e9/L    RBC Count 2.44 (L) 4.4 - 5.9 10e12/L    Hemoglobin 6.4 (LL) 13.3 - 17.7 g/dL    Hematocrit 21.1 (L) 40.0 - 53.0 %    MCV 87 78 - 100 fl    MCH 26.2 (L) 26.5 - 33.0 pg    MCHC 30.3 (L) 31.5 - 36.5 g/dL    RDW 19.6 (H) 10.0 - 15.0 %    Platelet Count 136 (L) 150 - 450 10e9/L

## 2020-04-13 NOTE — PROGRESS NOTES
GI: Called by Dr. Serrano about results of tagged RBC scan. Likely proximal small bowel bleed. Unfortunately a corresponding lesion was not noted on EGD x 2, enteroscopy or small bowel pill cam. Dr. Serrano willing to do angiogram but pt not currently NPO    Recs  -NPO after MN for angiography tomorrow per IR  -Continue current management      Also discussed with Dr. Caitlin Boyd MD  [facility]  Pager [phone] until 5 pm  Office [phone] for after hours on call provider

## 2020-04-14 NOTE — PLAN OF CARE
A/o. AVSS on RA. Denies pain. Rt groin site CDI, soft upon palpation. CMS intact. Full liquid diet. Up A2 w/ prosthesis in place. Voiding per urinal. Incontinent of bowel. Tele: AFib w/ CVR. CTM

## 2020-04-14 NOTE — PROCEDURES
[facility]    Procedure: Visceral angiogram    Date/Time: 4/14/2020 4:30 PM  Performed by: Patricio Dye MD  Authorized by: Patricio Dye MD     UNIVERSAL PROTOCOL   Site Marked: NA  Prior Images Obtained and Reviewed:  Yes  Required items: Required blood products, implants, devices and special equipment available    Patient identity confirmed:  Verbally with patient, arm band, provided demographic data and hospital-assigned identification number  Patient was reevaluated immediately before administering moderate or deep sedation or anesthesia  Confirmation Checklist:  Patient's identity using two indicators, relevant allergies, procedure was appropriate and matched the consent or emergent situation and correct equipment/implants were available  Time out: Immediately prior to the procedure a time out was called    Universal Protocol: the Joint Commission Universal Protocol was followed    Preparation: Patient was prepped and draped in usual sterile fashion    ESBL (mL):  10         ANESTHESIA    Anesthesia: Local infiltration  Local Anesthetic:  Lidocaine 1% without epinephrine      SEDATION    Patient Sedated: Yes    Vital signs: Vital signs monitored during sedation    See dictated procedure note for full details.  Findings: Right (CFA) arterial access and angioseal closure device at completion.  Abdominal aortogram and visceral angiogram.    Celiac axis, SMA (with replaced hepatic artery), and ALEXIS evaluated with source of bleed.  Therefore, unable to perform embolization due to lack of hemorrhage.    In review of clinic history and prior EGDs, tagged RBC scans--patient is consistently, though intermittently, bleeding.  Based on scattered need for transfusions and only faint positivity of recent Tagged RBC scan patient simply has not been bleeding briskly enough to visualize on angiogram.    130 ml of contrast administered--close monitoring of Cr required.    Specimens:  none    Complications: None    Condition: Stable    PROCEDURE   Patient Tolerance:  Patient tolerated the procedure well with no immediate complications    Length of time physician/provider present for 1:1 monitoring during sedation: 60

## 2020-04-14 NOTE — PLAN OF CARE
VSS.  Pt had a large bloody BM this am, Dr. Tucker aware. Pt's (R) groin site is C/D/I, with only a drop of blood, soft and no hematoma noted since angiogram.  Pt received and unit of PRBCs in IR today per MD orders, HGB to be rechecked at 6pm.  Pt denies pain at this time.  Pt remains in IMC status per MD orders.

## 2020-04-14 NOTE — PROGRESS NOTES
Sent the pt to IR around 1115, IR nurse to administer the unit of 1 PRBCS per MD orders.  The unit of blood for the pt was given to the IR RN when she came up to get the pt.

## 2020-04-14 NOTE — PROVIDER NOTIFICATION
Informed Dr. Tucker twice at 0800 and 0830 regarding HGB=6.2 and then HBG=6.1.  Paged Dr. Tucker again at this time, pt had a large bloody BM and the IV infiltrated again and asked for a midline or a PICC.  Awaiting a return call.

## 2020-04-14 NOTE — PLAN OF CARE
A/Ox4. VSS on RA. BS active,+flatus, +1 bloody bm.  LS clear. Skin: red/willis in lower extremity, access site on abdomen, CDI. CMS numbness/tingling, L BKA. Pain: Denies. Up with 1-2 w/ GB+Walker. NPO for angio this morning. Voiding to urinal.

## 2020-04-14 NOTE — PROGRESS NOTES
"[facility]  Hospitalist Progress Note  Solomon Tucker MD  04/14/2020    Assessment & Plan   Antonio Ramirez is a 75 year old male admitted on 3/31/2020 with complicated medical hx as below on chronic anticoagulation who was admitted with acute on chronic anemia due to rectal bleeding.     1. Alcohol cirrhosis with persistent ascites, portal hypertensive gastropathy, and assumed coagulapathy.  Alcohol dependence.  Severe acute GI bleed- suspecting small bowel AVM  Has standing appointment for paracentesis every 2 weeks (last 3/23) since 2016. Consume 3-4 vodka drinks per day.  No desire to quit drinking at this point. INR 3.92 on adm and reversed with vitamin K.  -- continue to hold PTA spironolactone and torsemide with soft BP in setting rectal bleeding. Indicated sporatic use PTA.  -- discontinued CIWA   -- unclear etiology, see discussion below  --continue to transfuse for hemoglobin < 7 g/dl  -- discontinued xarelto, asa on hold  -- appreciate GI follow-up, I discussed with Dr. Boyd on 4/7  -- had pill cam 4/3; Pill cam read 4/7. Pill did not leave his stomach the 12 hours it was on so no helpful information gained.  --had enteroscopy 4/8, showing some portal gastropathy and stomach, otherwise unremarkable, no bleeding  -- Tagged RBC scan was positive x 2  --Patient had PillCam placed endoscopically 4/9  --patient had a maroon colored stool 04/10; also per Dr. Boyd, \"Still having ongoing intermittent bleeding, etiology unclear. Could be small bowel or colon (only fair prep on colonoscopy)  - transfuse Hgb < 6.0 or if symptoms.  - Venofer infusion x 2.   - Continue Hgb checks daily, hgb drifting down again 6.9 >> 6.4 > 6.1   - repeating tagged rbc scan today, may repeat pill cam study again.  - had IR angiogram without any source of bleeding identified, await final report  - serial hgb next 24 hrs after large bleeds noted this am    2. Acute on chronic anemia and " chronic thrombocytopenia.   -- Chronic component related to ongoing alcohol use, cirrhosis, and CKD. Baseline hgb around 7.  -- 7 to 10 day history of rectal bleeding. Hgb 5.4 on adm with 5 units of PRBC transfusion.  -- EGD did not show source of bleeding. Colonoscopy with red blood in terminal ileum throughout entire examined colon, one 10 mm polyp in the ascending colon (not removed), sigmoid diverticulosis, a non-bleeding rectal varix, and non-bleeding external hemorrhoids. Tagged RBC scan suggested bleed in duodenum which was felt inaccurate as EGD showed normal duodenum.  -- changed to po protonix  --transfused 1 unit of PRBC on 4/4, repeated unit 4/8  --Cont hold ASA 81mg daily.  --IV ceftriaxone 2 gram daily for SBP prophylaxis. Now stopped.   --Heme/Onc consult requested, I discussed with Patricio Jones and appreciate his evaluation and recommendations   --A STEFANY studies including beta-2 glycoprotein, anticardiolipin antibodies WAS NEGATIVE and lupus inhibitor was psoitive.  But at this time given bleeding site not discovered, the risks of anticoagulation are likely too great and he will need to be off anticoagulation.      3. CAD s/p 3v-CABG.  Ischemic cardiomyopathy with HFrEF.  Severe pulmonary hypertension.  Valvular disorder. Mild to moderate mitral regurgitation, mild mitral stenosis, mild tricuspid regurgitation, and aortic stenosis.  -- denies any chest pain or sob  Plan:  -- holding asa     4. HTN, DLD.  -- not on any bp medications  -- just diuretics      5. Hx PEA arrest and VT arrest 6/2019.  Noncompliant in review of most recent cardiology note.  --Tele, I&Os, and daily weights.  --PTA ASA and diuretics on hold as above.     6. Persistent atrial fibrillation.  Patient stated that she goes in and out of atrial fibrillation.  He is compliant with daily Xarelto.  --discontinued Xarelto   --Hold aspirin       7. Antiphospholipid antibody syndrome with hx PE s/p IVC filter (2007).  --discontinued  Xarelto as above.  --Hold aspirin as above.  --See discussion above, I appreciate heme/onc recommendations     8. CKD, stage III. Baseline Cr 1.5, currently Cr 1.30  -- creatinine stable at 1.3-1.5  -- mucomyst to mitigate post contrast nephropathy     9. Chronic neuropathy. Gabapentin.       10. Left BKA.     11. Hx prostate CA and large cell B-cell non-Hodgkin's lymphoma:  S/p radioactive seeding of the prostate.  Appears to be in remission.    --No interventions necessary at this point.       12.  Severe malnutrition  Malnutrition: (4/7)   % Weight Loss:  > 7.5% in 3 months (severe malnutrition)   % Intake:  </= 50% for >/= 5 days (severe malnutrition) - on average   Subcutaneous Fat Loss:  Unable   Muscle Loss:  Unable   Fluid Retention:  Moderate       Malnutrition Diagnosis: Severe malnutrition      Diet: 2 gram Na diet  Levin Catheter: not present  DVT Prophylaxis: Pneumatic Compression Devices  Code Status: Full Code confirmed with patient 4/2.     Expected discharge: > 2 days    Interval History   -- had 2 large BM with blood this am  -- s/p visceral angiogram  -- updated family (wife)  -- discussed with IR pre procedure and GI post procedure    -Data reviewed today: I reviewed all new labs and imaging over the last 24 hours. I personally reviewed no images or EKG's today.    Physical Exam   Heart Rate: 70, Blood pressure 121/74, pulse 68, temperature 98.5  F (36.9  C), resp. rate 9, weight 102.8 kg (226 lb 10.1 oz), SpO2 100 %.  Vitals:    04/11/20 0552 04/12/20 0515 04/13/20 0535   Weight: 104.9 kg (231 lb 4.2 oz) 102.9 kg (226 lb 13.7 oz) 102.8 kg (226 lb 10.1 oz)     Vital Signs with Ranges  Temp:  [97.3  F (36.3  C)-98.7  F (37.1  C)] 98.5  F (36.9  C)  Pulse:  [58-72] 68  Heart Rate:  [60-76] 70  Resp:  [9-24] 9  BP: ()/(50-74) 121/74  SpO2:  [93 %-100 %] 100 %  I/O's Last 24 hours  I/O last 3 completed shifts:  In: 881.25 [P.O.:600; I.V.:281.25]  Out: 910 [Urine:910]    Constitutional: Awake,  alert, cooperative, no apparent distress  Respiratory: Clear to auscultation bilaterally, no crackles or wheezing  Cardiovascular: Regular rate and rhythm, normal S1 and S2, and no murmur noted  GI: Normal bowel sounds, soft, non-distended, non-tender  Skin/Integumen: No rashes, no cyanosis, no edema  Other:      Medications   All medications were reviewed.    sodium chloride 75 mL/hr at 04/14/20 1307       acetylcysteine 20 %  1,200 mg Oral BID     gabapentin  600 mg Oral BID     multivitamin w/minerals  1 tablet Oral Daily     pantoprazole  40 mg Oral BID AC     sodium chloride (PF)  10 mL Intracatheter Q8H     sodium chloride (PF)  10 mL Intracatheter Q8H        Data   Recent Labs   Lab 04/14/20  0816 04/14/20  0741 04/13/20  1642 04/13/20  0733  04/09/20  0612   WBC 4.6 4.6  --  4.5   < > 4.1   HGB 6.1* 6.2* 6.8* 6.4*   < > 7.4*   MCV 87 87  --  87   < > 86    162  --  136*   < > 144*   INR 1.37*  --   --   --   --  1.40*   NA  --  140  --   --   --  137   POTASSIUM  --  4.9  --   --   --  4.2   CHLORIDE  --  111*  --   --   --  107   CO2  --  24  --   --   --  26   BUN  --  27  --   --   --  22   CR  --  1.53*  --   --   --  1.30*   ANIONGAP  --  5  --   --   --  4   BENNIE  --  8.4*  --   --   --  8.2*   GLC  --  77  --   --   --  88   ALBUMIN  --   --   --   --   --  2.0*   PROTTOTAL  --   --   --   --   --  5.4*   BILITOTAL  --   --   --   --   --  0.7   ALKPHOS  --   --   --   --   --  57   ALT  --   --   --   --   --  10   AST  --   --   --   --   --  21    < > = values in this interval not displayed.       No results found for this or any previous visit (from the past 24 hour(s)).    Solomon Tucker MD  Text Page  (7am to 6pm)

## 2020-04-14 NOTE — PROGRESS NOTES
Phone consent was obtained from spouse Helena after explaining the procedure, risks and benefits and consent is in IR.     Discussed with Dr Javier and SHIMON Rivera.    Gini Leitschuh CNP ([phone])

## 2020-04-14 NOTE — PROGRESS NOTES
I discussed the case with Dr. Tucker yesterday. Recommendations to prevent post contrast nephropathy were given. Generalized most RCIN from resolved in 5-7 days. Please call us back with any questions or concerns. Thank you.

## 2020-04-14 NOTE — IR NOTE
Interventional Radiology Intra-procedural Nursing Note    Patient Name: Antonio Ramirez  Medical Record Number: [id number]  Today's Date: April 14, 2020    Start Time: 1155  End of procedure time: 1240  Procedure: Visceral Angiogram  Report given to: SHIMON Rivera station [id number]    Other Notes: Pt. transferred to IR table. Prepped and draped appropriately. Monitoring equipment applied. NC applied. No complaints of pain at this time. Timeout recorded. No sedation administered.    Infused 1 unit PRBC    5f sheath to Right femoral artery. Removed at 1235. A 6f angioseal placed to right femoral artery. Hemostasis at 1235. Site dressed with gauze and covered with tegaderm. Site C/D/I, soft.     Medication administration:    Fentanyl 25 mcg IVP

## 2020-04-14 NOTE — PROGRESS NOTES
Chart check    Hgb 6.1 lower, INR 1.37  Tagged RBC 4/13 shows suspicious for active GI bleeding, although  the exact location of the bleeding within the bowel is not clear on  this exam.        Assessment & Plan   Antonio Ramirez is a 75 year old male who was admitted on 3/31/2020.     APLA with history of saddle PE  - History of saddle PE 2007 with placement of IVC filter  - History of SDH s/p evacuation in 4/2018  - Has been on low dose rivaroxaban  - Now admitted with GI bleeding and off of DOAC therapy  - Repeat lupus anticoagulant test 4/8/20 positive (anticardiolipin antibodies and beta 2 glycoprotein negative)  - If source of bleeding can be identified and controlled, we can try to carefully resume low dose rivaroxaban. If bleeding site not discovered or controllable, then the risks of anticoagulation are likely too great and he will need to be off anticoagulation.  - continue off anticoagulation at present        Anemia  - acute exacerbation due to GI bleeding  - Has chronic anemia likely from liver disease, previous bleeding and underlying low grade lymphoproliferative disorder  - DOAC therapy has been discontinued this admission with acute bleeding  - No evidence of hemolysis with negative GISELL, haptoglobin, LDH, and blood smear  - Component of iron deficiency. Iron could be given IV to replace losses from GI bleeding.  - Continue close monitoring of blood counts  - Transfuse for hemoglobin < 6.0 (with attempt judicious use with low supply given COVID 19 pandemic), active bleeding or symptomatic anemia.    Thrombocytopenia  -mild and chronic  -most likely due to hypersplenism with cirrhosis and portal hypertension  -monitor    Elevated INR  - Likely from underlying liver disease  - 10 mg vitamin K given 3/31  - INR has improved, but not normalized    GI Bleeding  - Could be small bowel or colon (only fair prep on colonoscopy)  - Pill cam study negative  -Tagged RBC scan positive, angiogram is  planned      Alcoholic Cirrhosis  - per hospitalist    Code Status: Full Code    Zhanna Raymond    Interval History       Physical Exam   Temp: 98.1  F (36.7  C) Temp src: Oral BP: 98/50 Pulse: 63 Heart Rate: 65 Resp: 18 SpO2: 96 % O2 Device: None (Room air)    Vitals:    04/11/20 0552 04/12/20 0515 04/13/20 0535   Weight: 104.9 kg (231 lb 4.2 oz) 102.9 kg (226 lb 13.7 oz) 102.8 kg (226 lb 10.1 oz)     Vital Signs with Ranges  Temp:  [97.3  F (36.3  C)-98.1  F (36.7  C)] 98.1  F (36.7  C)  Pulse:  [62-70] 63  Heart Rate:  [65-76] 65  Resp:  [16-18] 18  BP: ()/(50-60) 98/50  SpO2:  [93 %-99 %] 96 %  I/O last 3 completed shifts:  In: 881.25 [P.O.:600; I.V.:281.25]  Out: 910 [Urine:910]      Medications     heparin       - MEDICATION INSTRUCTIONS -       sodium chloride 75 mL/hr at 04/14/20 0215       acetylcysteine 20 %  1,200 mg Oral BID     ceFAZolin  2 g Intravenous Pre-Op/Pre-procedure x 1 dose     gabapentin  600 mg Oral BID     multivitamin w/minerals  1 tablet Oral Daily     pantoprazole  40 mg Oral BID AC     sodium chloride (PF)  3 mL Intracatheter Q8H     sodium chloride (PF)  3 mL Intracatheter Q8H       Data   Results for orders placed or performed during the hospital encounter of 03/31/20 (from the past 24 hour(s))   NM GI Bleed    Narrative    NM GASTROINTESTINAL BLEED   4/13/2020 1:12 PM     HISTORY: GI bleeding.    COMPARISON: GI bleeding study performed 4/1/2020.    TECHNIQUE: The patient's red blood cells were labeled with 25 mCi Tc  99m ultra tagged red blood cells. Dynamic images were obtained out  through 60 minutes.    FINDINGS: A small area of radiotracer activity appears projected over  the left midabdomen at approximately 19 minutes, with several  additional areas of radiotracer activity appearing shortly thereafter  over the right midabdomen. Findings are consistent with active GI  bleeding. The exact location of this GI bleeding is not clear on this  exam, and could be related  to small bowel or colonic bleeding. These  findings have a similar appearance to the previous exam. Physiologic  radiotracer activity is again noted involving the heart, spleen,  penis, and arterial structures.      Impression    IMPRESSION: Findings are suspicious for active GI bleeding, although  the exact location of the bleeding within the bowel is not clear on  this exam.    I discussed these findings with Dr. Serrano from interventional  radiology 1:26 PM on 4/13/2020. I also discussed these findings with  the patient's nurse Gemini at 1:32 PM on 4/13/2020.    KACI BAUMANN MD   Hemoglobin   Result Value Ref Range    Hemoglobin 6.8 (LL) 13.3 - 17.7 g/dL   ABO/Rh type and screen   Result Value Ref Range    Units Ordered 1     ABO PENDING     Antibody Screen PENDING     Test Valid Only At [facility]        Specimen Expires 04/17/2020     Crossmatch Red Blood Cells    CBC with platelets   Result Value Ref Range    WBC 4.6 4.0 - 11.0 10e9/L    RBC Count 2.34 (L) 4.4 - 5.9 10e12/L    Hemoglobin 6.2 (LL) 13.3 - 17.7 g/dL    Hematocrit 20.3 (L) 40.0 - 53.0 %    MCV 87 78 - 100 fl    MCH 26.5 26.5 - 33.0 pg    MCHC 30.5 (L) 31.5 - 36.5 g/dL    RDW 19.8 (H) 10.0 - 15.0 %    Platelet Count 162 150 - 450 10e9/L   Basic metabolic panel   Result Value Ref Range    Sodium 140 133 - 144 mmol/L    Potassium 4.9 3.4 - 5.3 mmol/L    Chloride 111 (H) 94 - 109 mmol/L    Carbon Dioxide 24 20 - 32 mmol/L    Anion Gap 5 3 - 14 mmol/L    Glucose 77 70 - 99 mg/dL    Urea Nitrogen 27 7 - 30 mg/dL    Creatinine 1.53 (H) 0.66 - 1.25 mg/dL    GFR Estimate 43 (L) >60 mL/min/[1.73_m2]    GFR Estimate If Black 50 (L) >60 mL/min/[1.73_m2]    Calcium 8.4 (L) 8.5 - 10.1 mg/dL   CBC with platelets   Result Value Ref Range    WBC 4.6 4.0 - 11.0 10e9/L    RBC Count 2.32 (L) 4.4 - 5.9 10e12/L    Hemoglobin 6.1 (LL) 13.3 - 17.7 g/dL    Hematocrit 20.1 (L) 40.0 - 53.0 %    MCV 87 78 - 100 fl    MCH 26.3 (L) 26.5 - 33.0 pg    MCHC  30.3 (L) 31.5 - 36.5 g/dL    RDW 19.8 (H) 10.0 - 15.0 %    Platelet Count 167 150 - 450 10e9/L   INR   Result Value Ref Range    INR 1.37 (H) 0.86 - 1.14

## 2020-04-14 NOTE — PROGRESS NOTES
GI chart check. Labs and notes reviewed.      Plan for IR angiography today. No new GI recs.      Katherin Boyd MD  [facility]  Phone [phone] until 5 pm  Office [phone] for after hours on call provider

## 2020-04-15 NOTE — PROGRESS NOTES
"GASTROENTEROLOGY PROGRESS NOTE     SUBJECTIVE:  No BMs overnight or this am. HGB dropped to 6.1 overnight and received one unit PRBCs. Denies abdominal pain. Tolerating clears. Wants to eat more. Feels \"hopeless.\"      OBJECTIVE:  /60   Pulse 62   Temp 98  F (36.7  C) (Oral)   Resp 14   Wt 102.5 kg (225 lb 15.5 oz)   SpO2 95%   BMI 29.01 kg/m    Temp (24hrs), Av.9  F (36.6  C), Min:97.4  F (36.3  C), Max:98.7  F (37.1  C)    Patient Vitals for the past 72 hrs:   Weight   04/15/20 0600 102.5 kg (225 lb 15.5 oz)   20 0535 102.8 kg (226 lb 10.1 oz)       Intake/Output Summary (Last 24 hours) at 4/15/2020 1017  Last data filed at 4/15/2020 0641  Gross per 24 hour   Intake 2248 ml   Output 775 ml   Net 1473 ml        PHYSICAL EXAM  Gen: alert, oriented, NAD  Abd: soft, ND/NT, +BS           Additional Comments:  ROS, FH, SH: See initial GI consult for details.     I have reviewed the patient's new clinical lab results:     Recent Labs   Lab Test 04/15/20  0645 20  2340 20  1845 20  0816 20  0741  20  0733  20  0612  20  1742   WBC  --   --   --  4.6 4.6  --  4.5   < > 4.1   < >  --    HGB 6.9* 6.1* 6.8* 6.1* 6.2*   < > 6.4*   < > 7.4*   < > 7.7*   MCV  --   --   --  87 87  --  87   < > 86   < >  --    PLT  --   --   --  167 162  --  136*   < > 144*   < >  --    INR  --   --   --  1.37*  --   --   --   --  1.40*  --  1.34*    < > = values in this interval not displayed.     Recent Labs   Lab Test 04/15/20  0645 20  0741 20  0612   POTASSIUM 4.3 4.9 4.2   CHLORIDE 111* 111* 107   CO2 25 24 26   BUN 24 27 22   ANIONGAP 5 5 4     Recent Labs   Lab Test 20  0612 20  0636 20  1001  19  0220  19  1320 19  1635   ALBUMIN 2.0* 2.4* 2.6*   < >  --    < >  --   --    BILITOTAL 0.7 1.4* 0.7   < >  --    < >  --   --    ALT 10 11 13   < >  --    < >  --   --    AST 21 25 23   < >  --    < >  --   --    PROTEIN  --   --   --  "  --  10*  --  10* 10*    < > = values in this interval not displayed.        Assessment/Plan:  75 year old male with history of decompensated ETOH cirrhosis including portal gastropathy, ascites, CAD s/p CABG, severe pulmonary HTN, mitral valve and aortic valve stenosis, A.fib on xarelto prior to admission, antiphospholipid antibody syndrome and PE, B cell nonHdgkin's lymphoma, and prostate cancer in remission, admitted with persistent rectal bleeding and acute blood loss anemia.     1. GI bleed. Small bowel AVM suspected. Patient has now undergone EGD x 2, colonoscopy, PillCam, and most recently IR angiography 4/14 all of which were not able to identify source of bleeding. Tagged RBC scan x 2 did identify location in the proximal small bowel but subsequent EGD showed normal duodenum. Colonoscopy identified red blood in the TI and throughout colon, diverticulosis, and polyp (not removed), rectal varix, but prep was poor. HGB continues to drift down despite ongoing transfusions and holding anticoagulation. However, no bloody stools overnight or this am. Patient very frustrated.   --Offered repeat colonoscopy. Patient agreed to proceed.   --Colon prep today for colonoscopy tomorrow.   --Monitor HGB and transfuse prn.   --Wonder if palliative consult would helpful.     Patient discussed with Dr. Boyd.       Bailee Baugh, PAC  [facility] ([facility])  Office: [phone]

## 2020-04-15 NOTE — PLAN OF CARE
VSS.  Pt refused IMC monitoring, Dr. Tucker notified of the pt's refusal of monitoring and IMC status discontinued.  Hgb=7.2 at 1200 today.  Pt denies pain.  Pt refusing bowel prep and colonoscopy tomorrow, Dr. Tucker aware and SIVAKUMAR aware.  No bloody stools noted today.  Pt tolerating full liquid diet.  Pt denies pain.

## 2020-04-15 NOTE — PROVIDER NOTIFICATION
Brief update    Paged re: hgb 6.1, transfuse? Nursing notes order for transfuse <7, but variable parameters offered in documentation.    Transfuse 1 U PRBC. Even if threshold is <6, drifting down and anticipate would need transfusion at next check regardless.    Von Gomez MD  12:05 AM

## 2020-04-15 NOTE — PLAN OF CARE
IMC. A/Ox4 ex lethargic. VSS ex bradycardic. Tele A.fib with SVR and PVC. Angio site cdi, soft. Denied pain. Hgb at midnight 6.1, transfused 1 unit, recheck in. LS clear. +BS, +flatus, -bm. Voiding adequately. Tolerating clears. Up with assist x2 gb walker and prosthesis.

## 2020-04-15 NOTE — PROGRESS NOTES
"[facility]  Hospitalist Progress Note  Solomon Tucker MD  04/15/2020    Assessment & Plan   Antonio Ramirez is a 75 year old male admitted on 3/31/2020 with complicated medical hx as below on chronic anticoagulation who was admitted with acute on chronic anemia due to rectal bleeding.     1. Alcohol cirrhosis with persistent ascites, portal hypertensive gastropathy, and assumed coagulapathy.  Alcohol dependence.  Severe acute GI bleed- suspecting small bowel AVM  Has standing appointment for paracentesis every 2 weeks (last 3/23) since 2016. Consume 3-4 vodka drinks per day.  No desire to quit drinking at this point. INR 3.92 on adm and reversed with vitamin K.  -- continue to hold PTA spironolactone and torsemide with soft BP in setting rectal bleeding. Indicated sporatic use PTA.  -- discontinued CIWA   -- unclear etiology, see discussion below  --continue to transfuse for hemoglobin < 7 g/dl  -- discontinued xarelto, asa on hold  -- appreciate GI follow-up with Dr. Boyd on 4/7  -- had pill cam 4/3; Pill cam read 4/7. Pill did not leave his stomach the 12 hours it was on so no helpful information gained.  --had enteroscopy 4/8, showing some portal gastropathy and stomach, otherwise unremarkable, no bleeding  -- Tagged RBC scan was positive x 2  --Patient had PillCam placed endoscopically 4/9  --patient had a maroon colored stool 04/10; also per Dr. Boyd, \"Still having ongoing intermittent bleeding, etiology unclear. Could be small bowel or colon (only fair prep on colonoscopy) Also had some bloody diarrhea on 4/14 AM  - transfuse Hgb < 6.0 or if symptoms.  - Venofer infusion x 2.   - Continue Hgb checks daily, hgb drifting up down again 6.9 >> 6.4 > 6.1 > 7.1  - repeating tagged rbc scan showed faint bleed in colon or small bowel, may repeat pill cam study again.  - had IR angiogram without any source of bleeding identified, await final report  - serial hgb stable  - patient frustrated " and asking for a 2nd opinion, will consult Dr Nagy    2. Acute on chronic anemia and chronic thrombocytopenia.   -- Chronic component related to ongoing alcohol use, cirrhosis, and CKD. Baseline hgb around 7.  -- 7 to 10 day history of rectal bleeding. Hgb 5.4 on adm with 5 units of PRBC transfusion.  -- EGD did not show source of bleeding. Colonoscopy with red blood in terminal ileum throughout entire examined colon, one 10 mm polyp in the ascending colon (not removed), sigmoid diverticulosis, a non-bleeding rectal varix, and non-bleeding external hemorrhoids. Tagged RBC scan suggested bleed in duodenum which was felt inaccurate as EGD showed normal duodenum.  -- changed to po protonix  --transfused 1 unit of PRBC on 4/4, repeated unit 4/8  --Cont hold ASA 81mg daily.  --IV ceftriaxone 2 gram daily for SBP prophylaxis. Now stopped.   --Heme/Onc consult requested, I discussed with Patricio Jones and appreciate his evaluation and recommendations   --A STEFANY studies including beta-2 glycoprotein, anticardiolipin antibodies WAS NEGATIVE and lupus inhibitor was psoitive.  But at this time given bleeding site not discovered, the risks of anticoagulation are likely too great and he will need to be off anticoagulation.      3. CAD s/p 3v-CABG.  Ischemic cardiomyopathy with HFrEF.  Severe pulmonary hypertension.  Valvular disorder. Mild to moderate mitral regurgitation, mild mitral stenosis, mild tricuspid regurgitation, and aortic stenosis.  -- denies any chest pain or sob  Plan:  -- holding asa     4. HTN, DLD.  -- not on any bp medications  -- just diuretics, weight 225 lbs admit weight 222     5. Hx PEA arrest and VT arrest 6/2019.  Noncompliant in review of most recent cardiology note.  --Tele, I&Os, and daily weights.  --PTA ASA and diuretics on hold as above.     6. Persistent atrial fibrillation.  Patient stated that she goes in and out of atrial fibrillation.  He is compliant with daily Xarelto.  --discontinued  Xarelto   --Hold aspirin       7. Antiphospholipid antibody syndrome with hx PE s/p IVC filter (2007).  --discontinued Xarelto as above.  --Hold aspirin as above.  --See discussion above, I appreciate heme/onc recommendations     8. CKD, stage III. Baseline Cr 1.5, currently Cr 1.30  -- creatinine stable at 1.3-1.5  -- mucomyst yesterday and today to mitigate post contrast nephropathy     9. Chronic neuropathy. Gabapentin.       10. Left BKA.     11. Hx prostate CA and large cell B-cell non-Hodgkin's lymphoma:  S/p radioactive seeding of the prostate.  Appears to be in remission.    --No interventions necessary at this point.       12.  Severe malnutrition  Malnutrition: (4/7)   % Weight Loss:  > 7.5% in 3 months (severe malnutrition)   % Intake:  </= 50% for >/= 5 days (severe malnutrition) - on average   Subcutaneous Fat Loss:  Unable   Muscle Loss:  Unable   Fluid Retention:  Moderate       Malnutrition Diagnosis: Severe malnutrition      Diet: 2 gram Na diet  Levin Catheter: not present  DVT Prophylaxis: Pneumatic Compression Devices  Code Status: Full Code confirmed with patient 4/2.     Expected discharge: > 2 days    Interval History   -- no bloody stools past 24 hrs  -- hgb stable as is his kidney fxn  -- patient asking for another GI opinion    -Data reviewed today: I reviewed all new labs and imaging over the last 24 hours. I personally reviewed no images or EKG's today.    Physical Exam   Heart Rate: 64, Blood pressure 99/49, pulse 62, temperature 97.5  F (36.4  C), temperature source Oral, resp. rate 14, weight 102.5 kg (225 lb 15.5 oz), SpO2 98 %.  Vitals:    04/12/20 0515 04/13/20 0535 04/15/20 0600   Weight: 102.9 kg (226 lb 13.7 oz) 102.8 kg (226 lb 10.1 oz) 102.5 kg (225 lb 15.5 oz)     Vital Signs with Ranges  Temp:  [97.4  F (36.3  C)-98  F (36.7  C)] 97.5  F (36.4  C)  Pulse:  [52-75] 62  Heart Rate:  [49-73] 64  Resp:  [12-17] 14  BP: ()/(49-83) 99/49  SpO2:  [90 %-100 %] 98 %  I/O's Last  24 hours  I/O last 3 completed shifts:  In: 1928 [P.O.:200; I.V.:1428]  Out: 775 [Urine:775]    Constitutional: Awake, alert, cooperative, no apparent distress  Respiratory: Clear to auscultation bilaterally, no crackles or wheezing  Cardiovascular: Regular rate and rhythm, normal S1 and S2, and no murmur noted  GI: Normal bowel sounds, soft, non-distended, non-tender  Skin/Integumen: No rashes, no cyanosis, no edema, LLE BKA  Other:      Medications   All medications were reviewed.    sodium chloride 75 mL/hr at 04/15/20 0924       acetylcysteine 20 %  1,200 mg Oral BID     bisacodyl  10 mg Oral Once     gabapentin  600 mg Oral BID     [START ON 4/16/2020] magnesium citrate  296 mL Oral Once     multivitamin w/minerals  1 tablet Oral Daily     pantoprazole  40 mg Oral BID AC     polyethylene glycol  238 g Oral Once     sodium chloride (PF)  10 mL Intracatheter Q8H     sodium chloride (PF)  10 mL Intracatheter Q8H        Data   Recent Labs   Lab 04/15/20  1230 04/15/20  0645 04/14/20  2340  04/14/20  0816 04/14/20  0741  04/13/20  0733  04/09/20  0612   WBC  --   --   --   --  4.6 4.6  --  4.5   < > 4.1   HGB 7.2* 6.9* 6.1*   < > 6.1* 6.2*   < > 6.4*   < > 7.4*   MCV  --   --   --   --  87 87  --  87   < > 86   PLT  --   --   --   --  167 162  --  136*   < > 144*   INR  --   --   --   --  1.37*  --   --   --   --  1.40*   NA  --  141  --   --   --  140  --   --   --  137   POTASSIUM  --  4.3  --   --   --  4.9  --   --   --  4.2   CHLORIDE  --  111*  --   --   --  111*  --   --   --  107   CO2  --  25  --   --   --  24  --   --   --  26   BUN  --  24  --   --   --  27  --   --   --  22   CR  --  1.50*  --   --   --  1.53*  --   --   --  1.30*   ANIONGAP  --  5  --   --   --  5  --   --   --  4   BENNIE  --  7.8*  --   --   --  8.4*  --   --   --  8.2*   GLC  --  68*  --   --   --  77  --   --   --  88   ALBUMIN  --   --   --   --   --   --   --   --   --  2.0*   PROTTOTAL  --   --   --   --   --   --   --   --   --   5.4*   BILITOTAL  --   --   --   --   --   --   --   --   --  0.7   ALKPHOS  --   --   --   --   --   --   --   --   --  57   ALT  --   --   --   --   --   --   --   --   --  10   AST  --   --   --   --   --   --   --   --   --  21    < > = values in this interval not displayed.       No results found for this or any previous visit (from the past 24 hour(s)).    Solomon Tucker MD  Text Page  (7am to 6pm)

## 2020-04-15 NOTE — PROVIDER NOTIFICATION
Text paged Dr. Tucker regarding hgb recheck this am 6.9. Would yo like us to transfuse? Please advise.

## 2020-04-15 NOTE — PROGRESS NOTES
GI follow up    Had lengthy discussion with pt about plan of care. Pt more agitated than usual demanding that he have only one doctor coordinate all  his care. Initially he wanted me to do this but then when I told him I could not do this he decided he didn't want me to be involved in his care. Both his RN and I explained to him that his hospitalist would be his main doctor and that as GI I am only a consulting MD. This appeared to agitate him further. Essentially does not want a hospitalist to be involved in his care either. Again tried to explain that this is not how this hospital operates but he did not accept this. I offered to try to arrange transfer to a different hospital and he does not want that either. My PA, Nancy Baugh offered a repeat colonoscopy in an attempt to try to find bleeding source which has been elusive despite extensive testing. Initially agreed but now is refusing. Despite this discussion I was unable come to a resolution that would satisfy patient.    Recs  -Consider repeat tagged RBC scan, CTA (if creatinine permits), vs angiography for repeat bleeding  -Consider repeat colonoscopy if pt agreeable    Will sign off. Call with questions.    Katherin Boyd MD  [facility]  Phone [phone] until 5 pm  Office [phone] for after hours on call provider

## 2020-04-15 NOTE — PROVIDER NOTIFICATION
Re paged Dr. Ramos regarding hgb recheck. Would you like us to transfuse again, or monitor? Please advise    Kameron EDWARDS called back. No transfusion at this time, continue to monitor.

## 2020-04-15 NOTE — PROVIDER NOTIFICATION
Paged to Dr. Gomez hgb check at midnight is 6.1. Would you like us to transfuse. Please advise.    MD called back go okay to transfuse.

## 2020-04-16 NOTE — CONSULTS
Chart reviewed. Consult recieved from [facility].  Likely Acute on Chronic GI bleed in the setting of portal HTN. Likely Small bowel AVM vs portal gastropathy.    Start on Octreotide.  EGD with small bowel enteroscopy tomorrow.    Full consult dictated.    Rahul Nagy GI

## 2020-04-16 NOTE — PLAN OF CARE
OT: OT order received. Pt currently OOR for procedure at time of attempt. OT will continue to follow.

## 2020-04-16 NOTE — PROGRESS NOTES
RRT called for decrease level of consciousness. Stat labs obtained. Provider discussing plan with ICU and Dr Nagy. Pt made IMC.

## 2020-04-16 NOTE — PLAN OF CARE
VSS.  No c/o chest pain or SOB.  Refused any assessments this shift.  Upset about having multiple MDs involved in care.  X-ray was ordered and completed this shift.  C/O increase in coughing.  HGB was 7.2 this AM.  Will have a redraw in the AM.  Discharge pending.

## 2020-04-16 NOTE — PROVIDER NOTIFICATION
Hospitalist called and stated to transfuse if hgb <7.0     Stated he would order lasix, albumin, and possible colon prep

## 2020-04-16 NOTE — PROGRESS NOTES
"[facility]    Hospitalist Progress Note    Assessment & Plan   Antonio Ramirez is a 75 year old male admitted on 3/31/2020 with complicated medical hx as below on chronic anticoagulation who was admitted with acute on chronic anemia due to rectal bleeding.      Alcohol cirrhosis with persistent ascites, portal hypertensive gastropathy, and assumed coagulapathy.  Alcohol dependence.  Severe acute GI bleed- suspecting small bowel AVM  -Has standing appointment for paracentesis every 2 weeks (last 3/23) since 2016.  - Consume 3-4 vodka drinks per day.   - No desire to quit drinking at this point. INR 3.92 on adm and reversed with vitamin K.  --- had pill cam 4/3; Pill cam read 4/7. Pill did not leave his stomach the 12 hours it was on so no helpful information gained. Repeat pill cam several days later was negative. Discussed with [facility] on 4/16.   --had enteroscopy 4/8, showing some portal gastropathy and stomach, otherwise unremarkable, no bleeding  --- Tagged RBC scan was positive x 2  --Patient had PillCam placed endoscopically 4/9  --patient had a maroon colored stool 04/10; also per Dr. Boyd, \"Still having ongoing intermittent bleeding, etiology unclear. Could be small bowel or colon (only fair prep on colonoscopy) Also had some bloody diarrhea on 4/14 AM  -- repeating tagged rbc scan showed faint bleed in colon or small bowel, may repeat pill cam study again.  - had IR angiogram without any source of bleeding identified: : Negative angiogram for bleed. No active extravasation. No  suggestion of AVM or other obvious etiology for ongoing intermittent  GI bleed.  -- Venofer infusion x 2.  -INR 1.4. has received 10 mg IV Vit K  - Continue Hgb checks daily, hgb drifting up down again to 6 range.  6.9 >> 6.4 > 6.1 > 7.2--> 6.3    Today:   - low nl sbp Hb 6-7 range.   No complaints today  Anasarca BLE    Plan:   -octreotide started by dr. Nagy on 4/15 to decrease portal htn   -hold fluids, may " restart diuresis  -- continue to hold PTA spironolactone and torsemide with soft BP in setting rectal bleeding. Indicated sporatic use PTA.   -- discontinued CIWA   -- unclear etiology, see discussion below  --continue to transfuse for hemoglobin < 7 g/dl  -- discontinued xarelto, asa on hold  -- appreciate GI follow-up with Dr. Boyd on 4/7    - transfuse Hgb < 7.0 or if symptoms. Unit blood this am     -- serial hgb stable  - patient frustrated and asking for a 2nd opinion, will consult Dr Nagy 4/16  -egd today    Addendum: 1530; discussed with dr. Nagy. + oozing AVMs stomach and jejunum. Sites were treated. Likely cause of bleeding/drop in Hb.   Plan: albumin X 1 then start diuresis with 40mg IV q12 hours tonight, follow Hb q12 hours but doubt continued bleed. Am labs. GI colonoscopy prep today to ensure nl stool and no further bleeding. Now having nl stools. May need colonoscopy tomorrow. Hb up appropriately today with 1 unit blood. Transfuse for Hb <7 for now per discussion with GI. May restart aldactone tomorrow, cont octreotide to help reduce portal HTN which may be contributing to AVM bleeds. Stopped fluids this am      Acute on chronic anemia and chronic thrombocytopenia.   -- Chronic component related to ongoing alcohol use, cirrhosis, and CKD. Baseline hgb around 7.  -- 7 to 10 day history of rectal bleeding. Hgb 5.4 on adm with 5 units of PRBC transfusion.  -- EGD did not show source of bleeding. Colonoscopy with red blood in terminal ileum throughout entire examined colon, one 10 mm polyp in the ascending colon (not removed), sigmoid diverticulosis, a non-bleeding rectal varix, and non-bleeding external hemorrhoids. Tagged RBC scan suggested bleed in duodenum which was felt inaccurate as EGD showed normal duodenum.  -- changed to po protonix  --transfused 1 unit of PRBC on 4/4, repeated unit 4/8, repeated on 4/15 and 4/16    Today; no sxs. No bloody stools, nl pulse and bp, Hb 6.3. transfused  yesterday.     Plan:   -transfuse unit blood this am for Hb 6 range.   --Cont hold ASA 81mg daily.  --IV ceftriaxone 2 gram daily for SBP prophylaxis. Now stopped.   --Heme/Onc consult requested, I discussed with Patricio Jones and appreciate his evaluation and recommendations   --A STEFANY studies including beta-2 glycoprotein, anticardiolipin antibodies WAS NEGATIVE and lupus inhibitor was positive.  But at this time given bleeding site not discovered, the risks of anticoagulation are likely too great and he will need to be off anticoagulation.      CAD s/p 3v-CABG.  Ischemic cardiomyopathy with HFrEF.  Severe pulmonary hypertension.  Valvular disorder. Mild to moderate mitral regurgitation, mild mitral stenosis, mild tricuspid regurgitation, and moderate aortic stenosis per echo 2018.  -EF46% Lexiscan 10/2019  -- denies any chest pain or sob  Plan:  -- holding asa      HTN, DLD.  -- not on any bp medications  -- just diuretics, weight 225 lbs admit weight 222      Hx PEA arrest and VT arrest 6/2019.  Noncompliant in review of most recent cardiology note.  --Tele, I&Os, and daily weights.  --PTA ASA and diuretics on hold as above.      Persistent atrial fibrillation.  Patient stated that she goes in and out of atrial fibrillation.  He is compliant with daily Xarelto.  --discontinued Xarelto   --Hold aspirin        Antiphospholipid antibody syndrome with hx PE s/p IVC filter (2007).  --discontinued Xarelto as above.  --Hold aspirin as above.  --See discussion above, I appreciate heme/onc recommendations      CKD, stage III. Baseline Cr 1.5, 1.6-1.8 on admission.   currently Cr 1.5  -- creatinine stable at 1.3-1.5  -- mucomyst yesterday and today to mitigate post contrast nephropathy      Chronic neuropathy. Gabapentin.  tolerating      Left BKA.      Hx prostate CA and large cell B-cell non-Hodgkin's lymphoma:  S/p radioactive seeding of the prostate.  Appears to be in remission.    --No interventions necessary at this  point.        Severe malnutrition  Malnutrition: (4/7)   % Weight Loss:  > 7.5% in 3 months (severe malnutrition)   % Intake:  </= 50% for >/= 5 days (severe malnutrition) - on average   Subcutaneous Fat Loss:  Unable   Muscle Loss:  Unable   Fluid Retention:  Moderate       Malnutrition Diagnosis: Severe malnutrition      Diet: 2 gram Na diet, npo currently   Levin Catheter: not present  DVT Prophylaxis: Pneumatic Compression Devices  Code Status: Full Code confirmed with patient 4/2.     Expected discharge: > 2 days  Lives with wife, has w/c/.  PT, OT , SW consult      Daniel Hercules MD  Text Page  (7am to 6pm)  Interval History    Pt seen this am  Discussed with RN  No complaints.   Now going to get unit of blood for Hb <7  No bloody stools.   No sob. No abd pain. No complaints.     -Data reviewed today: I reviewed all new labs and imaging results over the last 24 hours. I personally reviewed labs from last 24 hours    Physical Exam   Temp: 98  F (36.7  C) Temp src: Axillary BP: 107/63 Pulse: 61 Heart Rate: 69 Resp: 18 SpO2: 95 % O2 Device: None (Room air)    Vitals:    04/13/20 0535 04/15/20 0600 04/16/20 0600   Weight: 102.8 kg (226 lb 10.1 oz) 102.5 kg (225 lb 15.5 oz) 102.2 kg (225 lb 5 oz)     Vital Signs with Ranges  Temp:  [97.5  F (36.4  C)-98  F (36.7  C)] 98  F (36.7  C)  Pulse:  [61-73] 61  Heart Rate:  [64-70] 69  Resp:  [14-18] 18  BP: ()/(49-63) 107/63  SpO2:  [94 %-100 %] 95 %  I/O last 3 completed shifts:  In: 800 [P.O.:500; I.V.:300]  Out: 575 [Urine:575]    Constitutional: Nad,  Chronically ill appearing  Neck: nl jvp  Respiratory: Breathing easily, slightly decrease R base , left lung clear  Cardiovascular: RRR no r/g/m. Not tachy  GI: soft, nt, nd  Skin/Integumen: no rash, significant edema BLE to abd level.   Msk; left BKA  Neuro; nl speech and mentation, oriented, grossly nonfocal    Medications     octreotide (sandoSTATIN) infusion ADULT 50 mcg/hr (04/15/20 2130)     sodium  chloride 75 mL/hr at 04/15/20 0924       acetylcysteine 20 %  1,200 mg Oral BID     bisacodyl  10 mg Oral Once     gabapentin  600 mg Oral BID     multivitamin w/minerals  1 tablet Oral Daily     pantoprazole  40 mg Oral BID AC     polyethylene glycol  238 g Oral Once     sodium chloride (PF)  10 mL Intracatheter Q8H     sodium chloride (PF)  10 mL Intracatheter Q8H       Data   Recent Labs   Lab 04/16/20  0613 04/15/20  1230 04/15/20  0645  04/14/20  0816 04/14/20  0741   WBC 4.7  --   --   --  4.6 4.6   HGB 6.3* 7.2* 6.9*   < > 6.1* 6.2*   MCV 90  --   --   --  87 87   *  --   --   --  167 162   INR 1.48*  --   --   --  1.37*  --      --  141  --   --  140   POTASSIUM 4.4  --  4.3  --   --  4.9   CHLORIDE 110*  --  111*  --   --  111*   CO2 25  --  25  --   --  24   BUN 24  --  24  --   --  27   CR 1.51*  --  1.50*  --   --  1.53*   ANIONGAP 5  --  5  --   --  5   BENNIE 7.9*  --  7.8*  --   --  8.4*   *  --  68*  --   --  77   ALBUMIN 1.8*  --   --   --   --   --    PROTTOTAL 4.6*  --   --   --   --   --    BILITOTAL 0.6  --   --   --   --   --    ALKPHOS 51  --   --   --   --   --    ALT 10  --   --   --   --   --    AST 25  --   --   --   --   --     < > = values in this interval not displayed.       Imaging:   Recent Results (from the past 24 hour(s))   XR Chest 2 Views    Narrative    XR CHEST TWO VIEWS   4/15/2020 5:45 PM     HISTORY: Cough.    COMPARISON: 3/11/2020 chest x-ray.      Impression    IMPRESSION: Moderate right pleural effusion slightly larger than on  the comparison study with associated atelectasis. Left lung appears  clear. Previous sternotomy with mild cardiomegaly. Pulmonary  vasculature does not appear distended.    JERMAIN SINGH MD

## 2020-04-16 NOTE — ANESTHESIA PREPROCEDURE EVALUATION
Anesthesia Pre-Procedure Evaluation    Patient: Antonio Ramirez   MRN: [id number] : 1945          Preoperative Diagnosis: Gastrointestinal hemorrhage with hematemesis [K92.0]    Procedure(s):  ENTEROSCOPY    Past Medical History:   Diagnosis Date     Alcoholism (H)      Amputated left leg (H)      Anemia      Anticardiolipin antibody syndrome (H)      Antiphospholipid antibody syndrome (H)      Antiplatelet or antithrombotic long-term use      Aortic root dilatation (H)      Aortic stenosis      Arthritis      Balance problem      Coronary artery disease     CABG 2007: SVG to LAD, SVG to diagonal, SVG to OM; cardiac cath 18: thrombectomy for restenosis of stents-sent for urgent CABG, cath 2007: GRECIA x2 to LAD, Gercia to diagonal     Gastro-oesophageal reflux disease      Heart attack (H)     apparently arrested, cardiac X5     Hiatal hernia      Hypercholesterolemia      Hypertension      Ischemic cardiomyopathy      Left foot drop      Liver disease     alcoholic liver disease, alcoholic cirrohsosis, portal hypertension     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)     ?When diagnosed, patient not aware of this     Moderate mitral regurgitation      Monoclonal gammopathy      Neuropathy      Noninfectious ileitis     diverticulitis of colon     Nonrheumatic tricuspid valve regurgitation      Paroxysmal atrial fibrillation (H)      PE (pulmonary embolism) 2006    saddle emboli 2006     Prostate cancer (H)     Treated with radiation (seed implants in ) -- no problems since     Pulmonary hypertension (H)      Renal disease     kidney stones     Syncope      Thrombocytopenia (H)      Urethral stricture      Venous thrombosis     IVC filter and lysis procedures      Past Surgical History:   Procedure Laterality Date     AMPUTATE LEG BELOW KNEE Left 2014    related to gangrene, started as foot ulcer related to neuropathy     AMPUTATE LEG BELOW KNEE Left 2017    Procedure:  AMPUTATE LEG BELOW KNEE;  Exploration of Left Leg Below Knee Amputation Stump with Ligation of Bleeders.;  Surgeon: Leandro Arreola MD;  Location:  OR     APPENDECTOMY       APPLY WOUND VAC Left 12/6/2017    Procedure: APPLY WOUND VAC;;  Surgeon: Saima Howard MD;  Location:  SD     ARTHROPLASTY HIP Right 11/27/2018    Procedure: RIGHT TOTAL HIP ARTHROPLASTY;  Surgeon: Saima Howard MD;  Location:  OR     ARTHROPLASTY KNEE Right 9/19/2014    Procedure: ARTHROPLASTY KNEE;  Surgeon: Saima Howard MD;  Location:  OR     CARDIAC SURGERY      CABG     CHOLECYSTECTOMY       COLONOSCOPY       COLONOSCOPY N/A 4/1/2020    Procedure: COLONOSCOPY;  Surgeon: Emeka Freeman DO;  Location:  GI     COMBINED CYSTOSCOPY, RETROGRADES, EXCHANGE STENT URETER(S) Left 7/24/2018    Procedure: COMBINED CYSTOSCOPY, RETROGRADES, EXCHANGE STENT URETER(S);  CYSTOSCOPY, BILATERAL RETROGRADES, BILATERAL URETERAL STENT EXCHANGE ;  Surgeon: Matt Cervantes MD;  Location:  OR     CRANIOTOMY Right 4/7/2018    Procedure: CRANIOTOMY;  Right Craniotomy For Subdural Hematoma;  Surgeon: Jono Issa MD;  Location:  OR     CYSTOSCOPY, DILATE URETHRA, COMBINED N/A 10/12/2016    Procedure: COMBINED CYSTOSCOPY, DILATE URETHRA;  Surgeon: Dimitry Bello MD;  Location:  OR     CYSTOSCOPY, REMOVE STENT(S), COMBINED Right 7/24/2018    Procedure: COMBINED CYSTOSCOPY, REMOVE STENT(S);;  Surgeon: Jose Hunter MD;  Location:  OR     ENDOSCOPIC STRIPPING VEIN(S)       ENTEROSCOPY SMALL BOWEL N/A 4/8/2020    Procedure: ENTEROSCOPY;  Surgeon: Katherin Boyd MD;  Location:  OR     esophagogastroduodenoscopy       ESOPHAGOSCOPY, GASTROSCOPY, DUODENOSCOPY (EGD), COMBINED N/A 3/11/2019    Procedure: COMBINED ESOPHAGOSCOPY, GASTROSCOPY, DUODENOSCOPY (EGD), BIOPSY SINGLE OR MULTIPLE;  Surgeon: Katherin Boyd MD;  Location:  GI     ESOPHAGOSCOPY, GASTROSCOPY, DUODENOSCOPY  (EGD), COMBINED N/A 9/27/2019    Procedure: ESOPHAGOGASTRODUODENOSCOPY, WITH FOREIGN BODY REMOVAL;  Surgeon: Raegan Mckeon MD;  Location:  GI     ESOPHAGOSCOPY, GASTROSCOPY, DUODENOSCOPY (EGD), COMBINED N/A 4/1/2020    Procedure: ESOPHAGOGASTRODUODENOSCOPY (EGD);  Surgeon: Emeka Freeman DO;  Location:  GI     ESOPHAGOSCOPY, GASTROSCOPY, DUODENOSCOPY (EGD), COMBINED N/A 4/9/2020    Procedure: Esophagoscopy, gastroscopy, duodenoscopy (EGD), combined;  Surgeon: Katherin Boyd MD;  Location:  GI     EYE SURGERY      cataracts     GENITOURINARY SURGERY      Prostate Seen Implants     HERNIA REPAIR      Umbilical     INCISION AND DRAINAGE LOWER EXTREMITY, COMBINED Left 12/1/2017    Procedure: COMBINED INCISION AND DRAINAGE LOWER EXTREMITY;  INCISION AND DRAINAGE OF LEFT BELOW KNEE AMPUTATION ABSCESS, WOUND VAC PLACEMENT;  Surgeon: Saima Howard MD;  Location:  OR     IR IVC FILTER PLACEMENT       IR VISCERAL ANGIOGRAM  4/14/2020     IRRIGATION AND DEBRIDEMENT LOWER EXTREMITY, COMBINED Left 12/6/2017    Procedure: COMBINED IRRIGATION AND DEBRIDEMENT LOWER EXTREMITY;  IRRIGATION AND DEBRIDEMENT OF LEFT BELOW KNEE AMPUTATION SITE WITH WOUND VAC REPLACEMENT;  Surgeon: Saima Howard MD;  Location:  SD     IRRIGATION AND DEBRIDEMENT LOWER EXTREMITY, COMBINED Left 12/8/2017    Procedure: COMBINED IRRIGATION AND DEBRIDEMENT LOWER EXTREMITY;  IRRIGATION AND DEBRIDEMENT OF LEFT BELOW THE KNEE AMPUTATION AND SHORTENING OF THE TIBIA WITH WOUND CLOSURE;  Surgeon: Saima Howard MD;  Location:  OR     LASER HOLMIUM LITHOTRIPSY URETER(S), INSERT STENT, COMBINED Bilateral 6/28/2018    Procedure: COMBINED CYSTOSCOPY, URETEROSCOPY, LASER HOLMIUM LITHOTRIPSY URETER(S), INSERT STENT;  CYSTOSCOPY, BILATERAL URETEROSCOPY,  HOLMIUM LASER LITHOTRIPSY, BILATERAL STENT PLACEMENT, STONE BASKETING;  Surgeon: Jose Hunter MD;  Location:  OR     LASER HOLMIUM LITHOTRIPSY URETER(S), INSERT  STENT, COMBINED Left 8/14/2018    Procedure: COMBINED CYSTOSCOPY, URETEROSCOPY, LASER HOLMIUM LITHOTRIPSY URETER(S), INSERT STENT;  CYSTOSCOPY, LEFT URETEROSCOPY, LEFT LASER HOLMIUM LITHOTRIPSY URETER(S) REMOVAL BILATERAL STENTS;  Surgeon: Jose Hunter MD;  Location:  OR     ORTHOPEDIC SURGERY      (R) knee scope     ORTHOPEDIC SURGERY      total hip      ORTHOPEDIC SURGERY      elbow surgery       Anesthesia Evaluation     . Pt has had prior anesthetic. Type: General (Glidescope in past)    No history of anesthetic complications          ROS/MED HX    ENT/Pulmonary:      (-) tobacco use, asthma, COPD and sleep apnea   Neurologic:     (+)neuropathy - L foot drop, other neuro  subdural hematoma s/p decompressive craniotomy - 4/2018   (-) seizures, CVA and TIA   Cardiovascular:     (+) Dyslipidemia, hypertension--CAD, -past MI,CABG-stent,2007 2 Drug Eluting Stent .. Taking blood thinners Pt has received instructions: Instructions Given to patient: Xarelto. CHF etiology: Ischemic Last EF: 40 date: 9/23/19 . fainting (syncope). :. dysrhythmias a-fib, valvular problems/murmurs type: AS and MR severe AS, mod MR, mod-severe TR:. pulmonary hypertension (Severe), Previous cardiac testing Echodate:9/23/19results:Left ventricular systolic function is moderately reduced. The visual ejection fraction is estimated at 40-45%. LVEF 41% based on biplane 2D tracing. There is severe distal anterior, mid-distal septal, and apical wall hypokinesis. Severe hypokinesis of the distal inferior wall. The right ventricle is moderately dilated. Mildly decreased right ventricular systolic function The left atrium is severely dilated. The right atrium is mild to moderately dilated. There is moderate mitral annular calcification. There is mild to moderate (1-2+) mitral regurgitation. There is mild mitral stenosis. The mean mitral valve gradient is 4mmHg at heart rate of 69 bpm. There is mild (1+) tricuspid regurgitation. Severe  (>55mmHg) pulmonary hypertension is present. Flattened septum is consistent with RV pressure overload. There is sclerosis, calcification, and restriction of the aortic valve opening compatible with moderate aortic stenosis. The calculated aortic valve area is 1.5cm2. The mean AoV pressure gradient is 18mmHg. Mild aortic root dilatation (4.0 cm). The ascending aorta is mildly dilated (4.3 cm). The rhythm was atrial fibrillation with controlled ventricular rate at rest. Compared to the study of 6/11/2019, pulmonary pressure is marginally lower with LVEF slightly higher. Aortic valvular gradients are unchanged. No change in aortic root or ascending aortic enlargement.date: results:ECG reviewed date:3/31/20 results:A-fib date: results:          METS/Exercise Tolerance:  1 - Eating, dressing   Hematologic:     (+) History of blood clots (PE) pt is anticoagulated, Anemia, Other Hematologic Disorder-Cirrhosis related thrombocytopenia; Antiphospholipid Ab      Musculoskeletal:   (+) arthritis,  other musculoskeletal- s/p LLE amputation      GI/Hepatic:     (+) GERD hiatal hernia, hepatitis type Alcoholic, liver disease, Other GI/Hepatic GI BLEED      Renal/Genitourinary:     (+) chronic renal disease, type: CRI, Pt does not require dialysis,       Endo: Comment: BMI 30    (+) Obesity (Class 1), .   (-) Type I DM and Type II DM   Psychiatric:     (+) psychiatric history other (comment) (EtOH abuse)      Infectious Disease:         Malignancy:   (+) Malignancy History of Prostate and Lymphoma/Leukemia          Other:    (+) H/O Chronic Pain,H/O chronic opiod use , other significant disability Other (comment)                        Physical Exam  Normal systems: pulmonary and dental    Airway   Mallampati: II  TM distance: >3 FB  Neck ROM: full    Dental     Cardiovascular   (+) murmur       Pulmonary             Lab Results   Component Value Date    WBC 4.7 04/16/2020    HGB 7.1 (L) 04/16/2020    HCT 20.4 (L) 04/16/2020     " (L) 04/16/2020    CRP 16.7 (H) 04/01/2019    SED 40 (H) 04/01/2019     04/16/2020    POTASSIUM 4.4 04/16/2020    CHLORIDE 110 (H) 04/16/2020    CO2 25 04/16/2020    BUN 24 04/16/2020    CR 1.51 (H) 04/16/2020     (H) 04/16/2020    BENNIE 7.9 (L) 04/16/2020    PHOS 3.6 03/02/2019    MAG 2.0 03/11/2020    ALBUMIN 1.8 (L) 04/16/2020    PROTTOTAL 4.6 (L) 04/16/2020    ALT 10 04/16/2020    AST 25 04/16/2020    ALKPHOS 51 04/16/2020    BILITOTAL 0.6 04/16/2020    JOVANA 44 12/13/2018    PTT 36 06/11/2019    INR 1.48 (H) 04/16/2020    FIBR 278 12/04/2017    TSH 0.86 10/31/2018       Preop Vitals  BP Readings from Last 3 Encounters:   04/16/20 112/61   03/23/20 112/63   03/11/20 117/48    Pulse Readings from Last 3 Encounters:   04/16/20 60   03/23/20 67   03/11/20 70      Resp Readings from Last 3 Encounters:   04/16/20 16   03/23/20 16   03/11/20 18    SpO2 Readings from Last 3 Encounters:   04/16/20 98%   03/23/20 96%   03/11/20 98%      Temp Readings from Last 1 Encounters:   04/16/20 36.4  C (97.6  F) (Oral)    Ht Readings from Last 1 Encounters:   03/09/20 1.88 m (6' 2\")      Wt Readings from Last 1 Encounters:   04/16/20 102.2 kg (225 lb 5 oz)    Estimated body mass index is 28.93 kg/m  as calculated from the following:    Height as of 3/9/20: 1.88 m (6' 2\").    Weight as of this encounter: 102.2 kg (225 lb 5 oz).       Anesthesia Plan      History & Physical Review  History and physical reviewed and following examination; no interval change.    ASA Status:  4 .    NPO Status:  > 8 hours    Plan for General with Intravenous and Propofol induction. Maintenance will be Balanced.      Additional equipment: Videolaryngoscope        Postoperative Care  Postoperative pain management:  IV analgesics and Multi-modal analgesia.      Consents  Anesthetic plan, risks, benefits and alternatives discussed with:  Patient..                 Leandro Mata MD  "

## 2020-04-16 NOTE — PLAN OF CARE
Pt vitally stable on room air. Disoriented at times. Agitated at times. 1 Unit of blood given this morning (Hgb was 6.3 and came back at 7.1 after unit. Hgb checks q6h). 1 time dose of albumin given. 1 time dose of lasix given. Having blood stools. No nausea. No major skin issues (mepis placed on prosthesis leg for blanchable redness). Went for scope today. Ax2 walker and GB (repo q2h when pt allows). Denies pain. Clear lung sounds.

## 2020-04-16 NOTE — PLAN OF CARE
PT: Orders received, chart reviewed, eval attempted. Pt off the unit for procedure at time of attempt.

## 2020-04-16 NOTE — PLAN OF CARE
Vital signs stable on room air except soft b/p in mid 90's. Alert and oriented. Lung sounds clear. Bowel sounds active, flatus present. Patient denies pain. Turn and repo q2h. Tolerating NPO. CMS/neuros intact except baseline neuropathy. Plan for EGD in AM.

## 2020-04-16 NOTE — CODE/RAPID RESPONSE
[facility]    RRT Note  4/16/2020   Time Called: 5: 24 PM    RRT called for: change in alertness     Assessment & Plan   Concern for GI bleed with dark blood per rectum   RRT called for due to change in alertness. Upon my arrival patient sleepy, wakes with interaction, oriented x 4, non-focal. He is pale, which is chronic. Passing now three liquid red/dark bloody stools. Discussed with Dr. Nagy. Dr. Nagy states enteroscopy today was an hour with a good deal of insufflation without evidence of active bleeding. No active bleed has been found this hospital stay per several imaging methods. He has a current type and screen.     Per GI note 4/15/2020 has had the following:  EGD x 2, colonoscopy, PillCam, IR visceral angiography 4/14 without obvious source of bleeding. Tagged RBC scan x 2 notable for location in the proxima small bowel but subsequent EDG with normal duodenum. Colonoscopy identified red blood int he T1 and throughout colon, diverticulosis, and polyp which was not removed, rectal varix, but prep was poor.     INTERVENTIONS:  - CBC, lactate, VBG  - if he develops signs of shock or hemoglobin down 1- gram will transfer to ICU for colonoscopy by Dr. Yakov woodson   - okay to continue Miralax Prep     Discussed with and defer further cares to ICU attending and Dr. Nagy. Will discuss with my oncoming colleague, ANA Dixon CNP, for continuity of care.     Code Status: Full Code     Anuja Mehnaz, DIPAK, CNP  Hospitalist Service, House Officer  [facility]     Text Page  Pager: [phone]    Allergies   Allergies   Allergen Reactions     Blood Transfusion Related (Informational Only) Other (See Comments)     Patient has a history of a clinically significant antibody against RBC antigens.  A delay in compatible RBCs may occur.     Kdc:Minocycline+[place] [facility] GI Disturbance     11/01/16-PT IS NOT AWARE OF THIS REACTION     Ciprofloxacin Itching      "Severe itching \"like ants were crawling\"     Hmg-Coa-R Inhibitors Other (See Comments)     Rhabdomyolysis occurred within a couple days  Rhabdomyolysis       Physical Exam   Vital Signs with Ranges:  Temp:  [97.1  F (36.2  C)-98.4  F (36.9  C)] 97.6  F (36.4  C)  Pulse:  [60-82] 67  Heart Rate:  [59-80] 61  Resp:  [10-18] 16  BP: ()/(48-67) 112/67  SpO2:  [88 %-100 %] 95 %  I/O last 3 completed shifts:  In: 1100 [P.O.:500; I.V.:600]  Out: 575 [Urine:575]    Constitutional: 75- year old male laying in bed without obvious acute distress.   Pulmonary: Lung fields clear, no obvious acute distress. He is not hypoxic.   Cardiovascular: Significant murmur which is not new per patient and documented on 4/12/2020 by attending.   GI: Round, slightly firm, non-tender.   Skin/Integumen: Very pale.   Neuro: Awake, alert, oriented x 4. Non-focal.   Psych:  Calm, cooperative.   Extremities: Moves all extremities.     CBC with Diff:  Recent Labs   Lab Test 04/16/20  1720  04/16/20  0613   WBC  --   --  4.7   HGB 7.4*   < > 6.3*   MCV  --   --  90   PLT  --   --  146*   INR  --   --  1.48*    < > = values in this interval not displayed.        Lactic Acid:    Lab Results   Component Value Date    LACT 1.0 04/16/2020           Comprehensive Metabolic Panel:  Recent Labs   Lab 04/16/20  0613      POTASSIUM 4.4   CHLORIDE 110*   CO2 25   ANIONGAP 5   *   BUN 24   CR 1.51*   GFRESTIMATED 44*   GFRESTBLACK 51*   BENNIE 7.9*   PROTTOTAL 4.6*   ALBUMIN 1.8*   BILITOTAL 0.6   ALKPHOS 51   AST 25   ALT 10       INR:    Recent Labs   Lab Test 04/16/20  0613   INR 1.48*     Time Spent on this Encounter   I spent 60 minutes of critical care time on the unit/floor managing the care of Antonio Ramirez. Upon evaluation, this patient had a high probability of imminent or life-threatening deterioration due to concern of acute blood loss anemia, which required my direct attention, intervention, and personal management. 100% of my time " was spent at the bedside counseling the patient and/or coordinating care regarding services listed in this note.

## 2020-04-16 NOTE — PROGRESS NOTES
Chart check    Hgb 7.1, INR 1.48  Tagged RBC 4/13 shows suspicious for active GI bleeding, although  the exact location of the bleeding within the bowel is not clear on  this exam.  Seen by dr Nagy and started on Octreotide.        Assessment & Plan   Antonio Ramirez is a 75 year old male who was admitted on 3/31/2020.     APLA with history of saddle PE  - History of saddle PE 2007 with placement of IVC filter  - History of SDH s/p evacuation in 4/2018  - Has been on low dose rivaroxaban  - Now admitted with GI bleeding and off of DOAC therapy  - Repeat lupus anticoagulant test 4/8/20 positive (anticardiolipin antibodies and beta 2 glycoprotein negative)  - If source of bleeding can be identified and controlled, we can try to carefully resume low dose rivaroxaban. If bleeding site not discovered or controllable, then the risks of anticoagulation are likely too great and he will need to be off anticoagulation.  - continue off anticoagulation at present        Anemia: GI w/u active  - acute exacerbation due to GI bleeding  - Has chronic anemia likely from liver disease, previous bleeding and underlying low grade lymphoproliferative disorder  - DOAC therapy has been discontinued this admission with acute bleeding  - No evidence of hemolysis with negative GISELL, haptoglobin, LDH, and blood smear  - Component of iron deficiency. Iron could be given IV to replace losses from GI bleeding.  - Continue close monitoring of blood counts  - Transfuse for hemoglobin < 6.0 (with attempt judicious use with low supply given COVID 19 pandemic), active bleeding or symptomatic anemia.    Thrombocytopenia  -mild and chronic  -most likely due to hypersplenism with cirrhosis and portal hypertension  -monitor    Elevated INR  - Likely from underlying liver disease  - 10 mg vitamin K given 3/31  - INR has improved, but not normalized    GI Bleeding  - Could be small bowel or colon (only fair prep on colonoscopy)  - Pill cam study  negative  -Tagged RBC scan positive, angiogram is planned      Alcoholic Cirrhosis  - per hospitalist    Code Status: Full Code    Zhanna Raymond    Interval History       Physical Exam   Temp: 98.4  F (36.9  C) Temp src: Temporal BP: 99/50 Pulse: 64 Heart Rate: 64 Resp: 10 SpO2: 98 % O2 Device: Nasal cannula Oxygen Delivery: 2 LPM  Vitals:    04/13/20 0535 04/15/20 0600 04/16/20 0600   Weight: 102.8 kg (226 lb 10.1 oz) 102.5 kg (225 lb 15.5 oz) 102.2 kg (225 lb 5 oz)     Vital Signs with Ranges  Temp:  [97.1  F (36.2  C)-98.4  F (36.9  C)] 98.4  F (36.9  C)  Pulse:  [60-82] 64  Heart Rate:  [59-80] 64  Resp:  [10-18] 10  BP: ()/(48-65) 99/50  SpO2:  [91 %-100 %] 98 %  I/O last 3 completed shifts:  In: 1100 [P.O.:500; I.V.:600]  Out: 575 [Urine:575]      Medications     lactated ringers       octreotide (sandoSTATIN) infusion ADULT 50 mcg/hr (04/16/20 1533)       acetylcysteine 20 %  1,200 mg Oral BID     albumin human  12.5 g Intravenous Once     bisacodyl  10 mg Oral Once     furosemide  40 mg Intravenous Q12H     gabapentin  600 mg Oral BID     multivitamin w/minerals  1 tablet Oral Daily     pantoprazole  40 mg Oral BID AC     polyethylene glycol  238 g Oral Once     polyethylene glycol  238 g Oral Once     sodium chloride (PF)  10 mL Intracatheter Q8H     sodium chloride (PF)  10 mL Intracatheter Q8H     sucralfate  1 g Oral 4x Daily AC & HS       Data   Results for orders placed or performed during the hospital encounter of 03/31/20 (from the past 24 hour(s))   Gastroenterology IP Consult: GI bleed; Consultant may enter orders: Yes; Patient to be seen: Routine - within 24 hours; Requested Clinic/Group: Dr. Nagy; Requesting provider? Attending physician    Rahul Das MD     4/15/2020  8:47 PM  Chart reviewed. Consult recieved from [facility].  Likely Acute on Chronic GI bleed in the setting of portal HTN.   Likely Small bowel AVM vs portal gastropathy.    Start on  Octreotide.  EGD with small bowel enteroscopy tomorrow.    Full consult dictated.    Rahul Nagy GI   XR Chest 2 Views    Narrative    XR CHEST TWO VIEWS   4/15/2020 5:45 PM     HISTORY: Cough.    COMPARISON: 3/11/2020 chest x-ray.      Impression    IMPRESSION: Moderate right pleural effusion slightly larger than on  the comparison study with associated atelectasis. Left lung appears  clear. Previous sternotomy with mild cardiomegaly. Pulmonary  vasculature does not appear distended.    JERMAIN SINGH MD   CBC with platelets   Result Value Ref Range    WBC 4.7 4.0 - 11.0 10e9/L    RBC Count 2.28 (L) 4.4 - 5.9 10e12/L    Hemoglobin 6.3 (LL) 13.3 - 17.7 g/dL    Hematocrit 20.4 (L) 40.0 - 53.0 %    MCV 90 78 - 100 fl    MCH 27.6 26.5 - 33.0 pg    MCHC 30.9 (L) 31.5 - 36.5 g/dL    RDW 19.0 (H) 10.0 - 15.0 %    Platelet Count 146 (L) 150 - 450 10e9/L   INR (AM Draw)   Result Value Ref Range    INR 1.48 (H) 0.86 - 1.14   Comprehensive metabolic panel   Result Value Ref Range    Sodium 140 133 - 144 mmol/L    Potassium 4.4 3.4 - 5.3 mmol/L    Chloride 110 (H) 94 - 109 mmol/L    Carbon Dioxide 25 20 - 32 mmol/L    Anion Gap 5 3 - 14 mmol/L    Glucose 107 (H) 70 - 99 mg/dL    Urea Nitrogen 24 7 - 30 mg/dL    Creatinine 1.51 (H) 0.66 - 1.25 mg/dL    GFR Estimate 44 (L) >60 mL/min/[1.73_m2]    GFR Estimate If Black 51 (L) >60 mL/min/[1.73_m2]    Calcium 7.9 (L) 8.5 - 10.1 mg/dL    Bilirubin Total 0.6 0.2 - 1.3 mg/dL    Albumin 1.8 (L) 3.4 - 5.0 g/dL    Protein Total 4.6 (L) 6.8 - 8.8 g/dL    Alkaline Phosphatase 51 40 - 150 U/L    ALT 10 0 - 70 U/L    AST 25 0 - 45 U/L   Hemoglobin   Result Value Ref Range    Hemoglobin 7.1 (L) 13.3 - 17.7 g/dL

## 2020-04-16 NOTE — ANESTHESIA CARE TRANSFER NOTE
Patient: Antonio Ramirez    Procedure(s):  ENTEROSCOPY, WITH HEMORRHAGE CONTROL    Diagnosis: Gastrointestinal hemorrhage with hematemesis [K92.0]  Diagnosis Additional Information: No value filed.    Anesthesia Type:   General     Note:  Airway :Nasal Cannula  Patient transferred to:PACU  Comments: Neuromuscular blockade not used after succinylcholine for intubation, spontaneous return of TOF 4/4 with sustained tetany, spontaneous respirations, adequate tidal volumes, followed commands to voice, oropharynx suctioned with soft flexible catheter, extubated atraumatically, extubated with suction, airway patent after extubation.  Oxygen via nasal cannula at 4 liters per minute to PACU. Oxygen tubing connected to wall O2 in PACU, SpO2, NiBP, and EKG monitors and alarms on and functioning, report on patient's clinical status given to PACU RN, RN questions answered.   Handoff Report: Identifed the Patient, Identified the Reponsible Provider, Reviewed the pertinent medical history, Discussed the surgical course, Reviewed Intra-OP anesthesia mangement and issues during anesthesia, Set expectations for post-procedure period and Allowed opportunity for questions and acknowledgement of understanding      Vitals: (Last set prior to Anesthesia Care Transfer)    CRNA VITALS  4/16/2020 1431 - 4/16/2020 1510      4/16/2020             Pulse:  86    Ht Rate:  81    SpO2:  100 %    Resp Rate (observed):  (!) 1    Resp Rate (set):  10                Electronically Signed By: DIPAK Silvestre CRNA  April 16, 2020  3:10 PM

## 2020-04-16 NOTE — ANESTHESIA POSTPROCEDURE EVALUATION
Patient: Antonio Ramirez    Procedure(s):  ENTEROSCOPY, WITH HEMORRHAGE CONTROL    Diagnosis:Gastrointestinal hemorrhage with hematemesis [K92.0]  Diagnosis Additional Information: No value filed.    Anesthesia Type:  General    Note:  Anesthesia Post Evaluation    Patient location during evaluation: PACU  Patient participation: Able to fully participate in evaluation  Level of consciousness: awake  Pain management: adequate  Airway patency: patent  Cardiovascular status: acceptable  Respiratory status: acceptable  Hydration status: acceptable  PONV: none             Last vitals:  Vitals:    04/16/20 1505 04/16/20 1510 04/16/20 1520   BP: 117/65 112/64 115/65   Pulse: 82 79 69   Resp: 15 15 15   Temp: 36.9  C (98.4  F)     SpO2: 96% 98% 98%         Electronically Signed By: Leandro Mata MD  April 16, 2020  3:30 PM

## 2020-04-17 NOTE — PROGRESS NOTES
Cross Cover:    Notified by nursing of Hgb 6.9. Was 7 around midnight. He is currently undergoing bowel prep and BPs have been stable.  Would defer possible transfusion to rounding hospitalist. Otherwise, continue with q6h hemoglobins at this point.     ELIJAH Collins, DO

## 2020-04-17 NOTE — PROGRESS NOTES
Colonoscopy findings consistent with actively bleeding NG venous malformation/Dieulafoy's lesion with active arterial bleeding in the distal transverse colon.  Bleeding was stopped completely with argon plasma coagulation Endo Clip placement and epinephrine injection.  Area was marked with Jemima ink for tattooing.  Multiple diverticuli throughout the colon 15 mm polyp in the ascending colon.  Large amount of old blood mixed with stool throughout the colon which was clean.  Patient has rectal varices and grade 2-3 hemorrhoids.  Plan: Continue to monitor hemoglobin every 6 hours start him on soft diet.  Repeat labs tomorrow.  Continue on IV octreotide for now.  GI will continue to follow along.    Rahul Nagy MD FACP  [facility]                                                                                                                          ; Current

## 2020-04-17 NOTE — PLAN OF CARE
"PT/OT: Order received; Chart reviewed; Patient noted to have a Hgb of 6.9, \"stools still bright red and bloody\" per nursing note, soft BP's and patient currently undergoing bowel prep for a possible colonoscopy today. Noted patient had an RRT last night for decreased responsiveness. Will plan to hold evaluation until after colonoscopy and patient more medically stable to tolerate evaluation  "

## 2020-04-17 NOTE — PROGRESS NOTES
.MD Notification    Notified Person: MD    Notified Person Name: Dr. Hercules    Notification Date/Time: 4/17/20 4949    Notification Interaction: in person    Purpose of Notification: Critical lab value, HGB: 6.8     Orders Received: Transfuse one unit of blood    Comments:

## 2020-04-17 NOTE — PROGRESS NOTES
[facility] chart check:     APLA with history of saddle PE  - History of saddle PE 2007 with placement of IVC filter  - History of SDH s/p evacuation in 4/2018  - Has been on low dose rivaroxaban  - Now admitted with GI bleeding and off of DOAC therapy  - Repeat lupus anticoagulant test 4/8/20 positive (anticardiolipin antibodies and beta 2 glycoprotein negative)  - If source of bleeding can be identified and controlled, we can try to carefully resume low dose rivaroxaban. If bleeding site not discovered or controllable, then the risks of anticoagulation are likely too great and he will need to be off anticoagulation.  - continue off anticoagulation at present      Anemia: GI w/u active  - acute exacerbation due to GI bleeding  - Has chronic anemia likely from liver disease, previous bleeding and underlying low grade lymphoproliferative disorder  - DOAC therapy has been discontinued this admission with acute bleeding  - No evidence of hemolysis with negative GISELL, haptoglobin, LDH, and blood smear  - Component of iron deficiency. Iron could be given IV to replace losses from GI bleeding.  - Continue close monitoring of blood counts  - Transfuse for hemoglobin < 6.0 (with attempt judicious use with low supply given COVID 19 pandemic), active bleeding or symptomatic anemia.     Thrombocytopenia  -mild and chronic  -most likely due to hypersplenism with cirrhosis and portal hypertension  -monitor     Elevated INR  - Likely from underlying liver disease  - 10 mg vitamin K given 3/31  - INR has improved, but not normalized     GI Bleeding  - Could be small bowel or colon (only fair prep on colonoscopy)  - Pill cam study negative  -Tagged RBC 4/13 shows suspicious for active GI bleeding, although the exact location of the bleeding within the bowel is not clear on this exam.  -IR visceral angiography 4/14 without obvious source of bleeding.   -octreotide started by dr. Nagy on 4/15 to decrease portal htn   - 4/16: upper  GI endoscopy with small bowel enteroscopy multiple AVMs were noticed in the proximal to mid jejunum and also in the duodenum patient also had moderate portal gastropathy with multiple area of minimal oozing all of those were treated with argon plasma coagulation.  - RRT for dark blood per rectum last uma.  -possible colonoscopy today.      Alcoholic Cirrhosis  - per hospitalist    Cyndi Umana  Nurse Practitioner  [facility]  [phone]

## 2020-04-17 NOTE — PLAN OF CARE
Pt becoming more alert through shift. Completed albumin. Vitally stable, afebrile. Multiple, incontinent, large stool/blood mixtures. Dr Nagy at bedside and aware. Completed addtl Hgb check now 7.2. Plan to continue bowel prep to clear colon in case of colonoscopy tomorrow. If pt becomes vitally unstable possible transfer to ICU.

## 2020-04-17 NOTE — PLAN OF CARE
A/Ox4. Can be forgetful at times. VSS. Tele: Controlled A-fib. AX2 turn and repo. Refusing repositioning but able to turn side to side. Incontinent of both bowel and bladder. Prepping today, stool loose watery and dark red. NPO except meds. HBG 6.8 at 1300. One unit of blood given recheck this evening. Plan for colonoscopy today at 1500. Patient back from procedure at 1800, very lethargic still sedated. Advanced to mechanical soft diet, tolerating full liquids. Denies pain. Continue to monitor closely.

## 2020-04-17 NOTE — PROVIDER NOTIFICATION
Paged Dr. Collins, on call hospitalist for hemoglobin of 6.9, current orders to transfuse if less than 7. However, told to hold off on transfusion until rounding hospitalist here as hemoglobin only dropped to 6.9 from 7. VSS with soft BPs.

## 2020-04-17 NOTE — PROGRESS NOTES
"[facility]    Hospitalist Progress Note    Assessment & Plan   Antonio Ramirez is a 75 year old male admitted on 3/31/2020 with complicated medical hx as below on chronic anticoagulation who was admitted with acute on chronic anemia due to rectal bleeding.      Alcohol cirrhosis with persistent ascites, portal hypertensive gastropathy, and assumed coagulapathy.  Alcohol dependence.  Severe acute GI bleed- suspecting small bowel AVM  -Has standing appointment for paracentesis every 2 weeks (last 3/23) since 2016.  - Consume 3-4 vodka drinks per day.   - No desire to quit drinking at this point. INR 3.92 on adm and reversed with vitamin K.  --- had pill cam 4/3; Pill cam read 4/7. Pill did not leave his stomach the 12 hours it was on so no helpful information gained. Repeat pill cam several days later was negative. Discussed with [facility] on 4/16.   --had enteroscopy 4/8, showing some portal gastropathy and stomach, otherwise unremarkable, no bleeding  --- Tagged RBC scan was positive x 2  --Patient had PillCam placed endoscopically 4/9  --patient had a maroon colored stool 04/10; also per Dr. Boyd, \"Still having ongoing intermittent bleeding, etiology unclear. Could be small bowel or colon (only fair prep on colonoscopy) Also had some bloody diarrhea on 4/14 AM  -- repeating tagged rbc scan showed faint bleed in colon or small bowel, may repeat pill cam study again.  - had IR angiogram without any source of bleeding identified: : Negative angiogram for bleed. No active extravasation. No  suggestion of AVM or other obvious etiology for ongoing intermittent  GI bleed.  -- Venofer infusion x 2.  -INR 1.4. has received 10 mg IV Vit K  - Continue Hgb checks daily, hgb drifting up down again to 6 range.  6.9 >> 6.4 > 6.1 > 7.2--> 6.3  -S/p EGD and enteroscopy by Dr. Nagy 4/16: + oozing AVMs stomach and jejunum. Sites were treated. Likely cause of bleeding/drop in Hb.   - given albumin iv and started IV " lasix diuresis on 4/16 X 2  -colonoscopy prep started 4/16.     Today: wt is down. 3 kg to 99.7 kg. Admission wt 100.7. ? Accurate I/O  Gastric and small bowel AVMs likely cause of bleeding  With colonoscopy prep passing dark, black stool.   Suspect not actively bleeding but difficult to assess.   Pt slightly disoriented- seems stable.     Plan:   - colonoscopy today at 1500  -lasix 40mg IV at 1400 then daily for now. Had dose this am  - recheck Hb daily and at 1300 today, transfuse if <7 at 1300  -octreotide started by dr. Nagy on 4/15 to decrease portal htn   -stopped fluids,restarted diuresis  -may start aldactone today  -- discontinued CIWA   -- unclear etiology, see discussion below  --continue to transfuse for hemoglobin < 7 g/dl  -- discontinued xarelto, asa on hold  -- appreciate GI follow-up with Dr. Boyd on 4/7    - transfuse Hgb < 7.0 or if symptoms.  -may need transfer to [facility] for balloon enteroscopy    Discussed care plan with pt, RN, GI today    Addendum 1743:   Discussed colonoscopy results with Dr Nagy, found to have clearly bleeding AVM transverse colon, s/p epi, cauterize and clip. He thinks this is very likely the cause of his GI bleed this hospital stay. Site in transverse colon also coincides with tagged study showing bleeding in this area.   Colon now very clean so should pass clear, pink tinged stool now. If having clearly bloody stools going forward this would indicate rebleed and NOT old blood.   Pt transfused this afternoon. Cont q6 hour Hb levels. Cont octreotide overnight. Start soft diet tonight- GI to order.         Acute on chronic anemia and chronic thrombocytopenia.   -- Chronic component related to ongoing alcohol use, cirrhosis, and CKD and previous bleeding and underlying low grade lymphoproliferative disorder. .  -Baseline hgb around 7.  - DOAC therapy has been discontinued this admission with acute bleeding  - No evidence of hemolysis with negative GISELL,  haptoglobin, LDH, and blood smear  - Component of iron deficiency. Iron could be given IV to replace losses from GI bleeding. Given two doses of iv iron this stay  -- 7 to 10 day history of rectal bleeding. Hgb 5.4 on adm with 5 units of PRBC transfusion.  -- EGD did not show source of bleeding. Colonoscopy with red blood in terminal ileum throughout entire examined colon, one 10 mm polyp in the ascending colon (not removed), sigmoid diverticulosis, a non-bleeding rectal varix, and non-bleeding external hemorrhoids. Tagged RBC scan suggested bleed in duodenum which was felt inaccurate as EGD showed normal duodenum.  -- changed to po protonix  --transfused 1 unit of PRBC on 4/4, repeated unit 4/8, repeated on 4/15 and 4/16    Today; some bloody stools last night post egd  Hb stable overnight. High 6 range this am but may be lab variation    Plan:   -recheck Hb at 1300. Transfuse if <7.   --Cont hold ASA 81mg daily.  --IV ceftriaxone 2 gram daily for SBP prophylaxis. Now stopped.   --Heme/Onc consult requested, I discussed with Patricio Jones and appreciate his evaluation and recommendations   --A STEFANY studies including beta-2 glycoprotein, anticardiolipin antibodies WAS NEGATIVE and lupus inhibitor was positive.  But at this time given bleeding site not discovered, the risks of anticoagulation are likely too great and he will need to be off anticoagulation.      CAD s/p 3v-CABG.  Ischemic cardiomyopathy with HFrEF.  Severe pulmonary hypertension.  Valvular disorder. Mild to moderate mitral regurgitation, mild mitral stenosis, mild tricuspid regurgitation, and moderate aortic stenosis per echo 2018.  -EF46% Lexiscan 10/2019  -- denies any chest pain or sob  Plan:  -- holding asa  -diuresing now, stopped ivfs 4/16      HTN, DLD.  -- not on any bp medications  -- just diuretics, weight 225 lbs admit weight 222      Hx PEA arrest and VT arrest 6/2019.  Noncompliant in review of most recent cardiology note.  --Tele, I&Os,  "and daily weights.  --PTA ASA and diuretics on hold as above.      Persistent atrial fibrillation.  Patient stated that she goes in and out of atrial fibrillation.  He is compliant with daily Xarelto.  Rate controlled.     Plan:   --discontinued Xarelto   --Hold aspirin        Antiphospholipid antibody syndrome with hx PE s/p IVC filter (2007).  --discontinued Xarelto as above.  --Hold aspirin as above.  --See discussion above, I appreciate heme/onc recommendations  -per oncology, \"If source of bleeding can be identified and controlled, we can try to carefully resume low dose rivaroxaban. If bleeding site not discovered or controllable, then the risks of anticoagulation are likely too great and he will need to be off anticoagulation.  - continue off anticoagulation at present \"     -holding anticoagulation, will discuss timing of resumption with oncology and GI     CKD, stage III. Baseline Cr 1.5, 1.6-1.8 on admission.   currently Cr 1.5  -- creatinine stable at 1.3-1.5--> 1.5.   -- mucomyst yesterday and today to mitigate post contras wt nephropathy      Chronic neuropathy. Gabapentin.  tolerating      Left BKA.      Hx prostate CA and large cell B-cell non-Hodgkin's lymphoma:  S/p radioactive seeding of the prostate.  Appears to be in remission.    --No interventions necessary at this point.        Severe malnutrition  Malnutrition: (4/7)   % Weight Loss:  > 7.5% in 3 months (severe malnutrition)   % Intake:  </= 50% for >/= 5 days (severe malnutrition) - on average   Subcutaneous Fat Loss:  Unable   Muscle Loss:  Unable   Fluid Retention:  Moderate       Malnutrition Diagnosis: Severe malnutrition      Diet: 2 gram Na diet,  Levin Catheter: not present  DVT Prophylaxis: Pneumatic Compression Devices  Code Status: Full Code confirmed with patient 4/2.     Expected discharge: > 2 days  Lives with wife, has w/c/.  PT, OT , SW consult     Discussed care plan with pt, RN, GI and pt's wife. Updated wife on care plan " including current need to hold anticoagulation and may need TCU    Daniel Hercules MD  Text Page  (7am to 6pm)  Interval History    EGD, enteroscopy yesterday with oozing avms  Passed some bloody stools yesterday pm post procedure.   Hb stable overnight.   GI prep overnight    -Data reviewed today: I reviewed all new labs and imaging results over the last 24 hours. I personally reviewed labs from last 24 hours    Physical Exam   Temp: 97.6  F (36.4  C) Temp src: Oral BP: 118/59 Pulse: 65 Heart Rate: 65 Resp: 26 SpO2: 98 % O2 Device: None (Room air) Oxygen Delivery: 1 LPM  Vitals:    04/15/20 0600 04/16/20 0600 04/17/20 0600   Weight: 102.5 kg (225 lb 15.5 oz) 102.2 kg (225 lb 5 oz) 99.7 kg (219 lb 12.8 oz)     Vital Signs with Ranges  Temp:  [97.3  F (36.3  C)-98.4  F (36.9  C)] 97.6  F (36.4  C)  Pulse:  [] 65  Heart Rate:  [53-80] 65  Resp:  [10-27] 26  BP: ()/(48-67) 118/59  SpO2:  [88 %-100 %] 98 %  I/O last 3 completed shifts:  In: 1815 [P.O.:840; I.V.:975]  Out: 250 [Urine:250]    Constitutional: Nad,  Chronically ill appearing  Neck: nl jvp  Respiratory: Breathing easily, slightly decrease R base , left lung clear  Cardiovascular: RRR no r/g/m. Not tachy  GI: soft, nt, nd  Skin/Integumen: no rash, significant edema BLE to abd level.   Msk; left BKA  Neuro; nl speech and mentation, oriented, grossly nonfocal    Medications     lactated ringers Stopped (04/16/20 1633)     - MEDICATION INSTRUCTIONS -       octreotide (sandoSTATIN) infusion ADULT 50 mcg/hr (04/16/20 1533)       acetylcysteine 20 %  1,200 mg Oral BID     bisacodyl  10 mg Oral Once     furosemide  40 mg Intravenous Once     [START ON 4/18/2020] furosemide  40 mg Intravenous Daily     gabapentin  600 mg Oral BID     multivitamin w/minerals  1 tablet Oral Daily     pantoprazole  40 mg Oral BID AC     polyethylene glycol  238 g Oral Once     sodium chloride (PF)  10 mL Intracatheter Q8H     sodium chloride (PF)  10 mL Intracatheter Q8H      sucralfate  1 g Oral 4x Daily AC & HS       Data   Recent Labs   Lab 04/17/20  0545 04/17/20  0028 04/16/20  2130  04/16/20  0613  04/15/20  0645  04/14/20  0816   WBC 5.5  --  6.9  --  4.7  --   --   --  4.6   HGB 6.9* 7.0* 7.2*   < > 6.3*   < > 6.9*   < > 6.1*   MCV 91  --  90  --  90  --   --   --  87     --  146*  --  146*  --   --   --  167   INR  --   --   --   --  1.48*  --   --   --  1.37*     --   --   --  140  --  141  --   --    POTASSIUM 4.4  --   --   --  4.4  --  4.3  --   --    CHLORIDE 112*  --   --   --  110*  --  111*  --   --    CO2 27  --   --   --  25  --  25  --   --    BUN 24  --   --   --  24  --  24  --   --    CR 1.50*  --   --   --  1.51*  --  1.50*  --   --    ANIONGAP 4  --   --   --  5  --  5  --   --    BENNIE 7.8*  --   --   --  7.9*  --  7.8*  --   --    *  --   --   --  107*  --  68*  --   --    ALBUMIN  --   --   --   --  1.8*  --   --   --   --    PROTTOTAL  --   --   --   --  4.6*  --   --   --   --    BILITOTAL  --   --   --   --  0.6  --   --   --   --    ALKPHOS  --   --   --   --  51  --   --   --   --    ALT  --   --   --   --  10  --   --   --   --    AST  --   --   --   --  25  --   --   --   --     < > = values in this interval not displayed.       Imaging:   No results found for this or any previous visit (from the past 24 hour(s)).

## 2020-04-17 NOTE — PLAN OF CARE
Patient is A/O x4, forgetfult. Soft BP, yelena (50s) otherwise VSS on RA. Pt denies pain. BS are audible and active. Denies nausea, passing gas. Tele Afib SVR. Voiding in urinal. Encouraging prep overnight, stools still bright red and bloody. NPO except for prep since MN. Plan on continuing to monitor vitals and stools, possible colonoscopy today.

## 2020-04-17 NOTE — PROGRESS NOTES
[facility]  Gastroenterology Progress Note     Antonio Ramirez MRN# [id number]   YOB: 1945 Age: 75 year old          Assessment and Plan:     Rectal bleeding  Patient is clinically doing well patient had one bowel movement today patient's hemoglobin is 7.1 after his transfusion.  Patient is tolerating his current medical plan and IV octreotide.  Patient is denying any new GI complaint.  Patient is scheduled for repeat upper GI endoscopy and small bowel enteroscopy.  If above-mentioned work-up is negative patient will need further evaluation with colonoscopy.  Patient findings are highly suggestive of AVM most likely in the small bowel versus duodenum.  Patient bleeding scan was consistent with pooling of blood in the left upper quadrant area and the second scan showed possible duodenum.  If above-mentioned work-up is all negative I will strongly recommend the patient to be evaluated for possible double-balloon small bowel enteroscopy.  In the meantime continue with octreotide and supportive care.  Patient was explained the options patient voiced understanding.          Gastrointestinal hemorrhage with melena  Anemia due to blood loss, acute  Elevated INR  Alcoholic liver disease (H)      Interval History:   doing well; no cp, sob, n/v/d, or abd pain.              Review of Systems:   C: NEGATIVE for fever, chills, change in weight  E/M: NEGATIVE for ear, mouth and throat problems  R: NEGATIVE for significant cough or SOB  CV: NEGATIVE for chest pain, palpitations or peripheral edema             Medications:   I have reviewed this patient's current medications    acetylcysteine 20 %  1,200 mg Oral BID     bisacodyl  10 mg Oral Once     furosemide  40 mg Intravenous Q12H     gabapentin  600 mg Oral BID     multivitamin w/minerals  1 tablet Oral Daily     pantoprazole  40 mg Oral BID AC     polyethylene glycol  238 g Oral Once     sodium chloride (PF)  10 mL Intracatheter Q8H     sodium  chloride (PF)  10 mL Intracatheter Q8H     sucralfate  1 g Oral 4x Daily AC & HS                  Physical Exam:   Vitals were reviewed  Vital Signs with Ranges  Temp:  [97.1  F (36.2  C)-98.4  F (36.9  C)] 97.3  F (36.3  C)  Pulse:  [60-82] 67  Heart Rate:  [54-80] 58  Resp:  [10-18] 13  BP: ()/(48-67) 110/66  SpO2:  [88 %-100 %] 100 %  I/O last 3 completed shifts:  In: 1100 [P.O.:500; I.V.:600]  Out: 575 [Urine:575]  Constitutional: healthy, alert and no distress   Cardiovascular: negative, PMI normal. No lifts, heaves, or thrills. RRR. No murmurs, clicks gallops or rub  Respiratory: negative, Percussion normal. Good diaphragmatic excursion. Lungs clear  Head: Normocephalic. No masses, lesions, tenderness or abnormalities  Neck: Neck supple. No adenopathy. Thyroid symmetric, normal size,, Carotids without bruits.  Abdomen: Abdomen soft, non-tender. BS normal. No masses, organomegaly  SKIN: no suspicious lesions or rashes             Data:   I reviewed the patient's new clinical lab test results.   Recent Labs   Lab Test 04/16/20  1720 04/16/20  1245 04/16/20  0613  04/14/20  0816 04/14/20  0741  04/09/20  0612   WBC  --   --  4.7  --  4.6 4.6   < > 4.1   HGB 7.4* 7.1* 6.3*   < > 6.1* 6.2*   < > 7.4*   MCV  --   --  90  --  87 87   < > 86   PLT  --   --  146*  --  167 162   < > 144*   INR  --   --  1.48*  --  1.37*  --   --  1.40*    < > = values in this interval not displayed.     Recent Labs   Lab Test 04/16/20  0613 04/15/20  0645 04/14/20  0741   POTASSIUM 4.4 4.3 4.9   CHLORIDE 110* 111* 111*   CO2 25 25 24   BUN 24 24 27   ANIONGAP 5 5 5     Recent Labs   Lab Test 04/16/20  0613 04/09/20  0612 04/01/20  0636  06/11/19  0220  03/03/19  1320 02/28/19  1635   ALBUMIN 1.8* 2.0* 2.4*   < >  --    < >  --   --    BILITOTAL 0.6 0.7 1.4*   < >  --    < >  --   --    ALT 10 10 11   < >  --    < >  --   --    AST 25 21 25   < >  --    < >  --   --    PROTEIN  --   --   --   --  10*  --  10* 10*    < > = values in  this interval not displayed.       I reviewed the patient's new imaging results.    All laboratory data reviewed  All imaging studies reviewed by me.    Rahul Nagy MD,  4/16/2020  [facility]  Office : [phone]  Cell: [phone]

## 2020-04-17 NOTE — PROGRESS NOTES
Patient is clinically feeling tired.  Earlier patient had upper GI endoscopy with small bowel enteroscopy multiple AVMs were noticed in the proximal to mid jejunum and also in the duodenum patient also had moderate portal gastropathy with multiple area of minimal oozing all of those were treated with argon plasma coagulation.  Patient was started on prep for colonoscopy patient has been passing dark stools mixed with blood with some reddish content.  Patient is hemodynamically stable.  Patient's last hemoglobin was 7.4 which is stable so far.  Plan continue on oral colonoscopy prep.  Continue to monitor clinically continue to monitor serial hemoglobins possible colonoscopy tomorrow however if patient's stool output clears out and no further bleeding noticed patient may not need colonoscopy and monitor clinically.  If patient become clinically symptomatic or signs of significant active bleeding we will proceed with colonoscopy tonight.    Rahul Nagy GI

## 2020-04-17 NOTE — PROGRESS NOTES
Loss of IV access at time of need for blood transfusion. Nurse, flying squad and IV team to attempt IV. Dr. Nagy notified. He said if IV team unable to get access they can attempt down in endoscopy and he can receive blood during of after colonoscopy.

## 2020-04-18 NOTE — PLAN OF CARE
OT: orders received and chart reviewed, PT completed OT needs, identified OT needs. Will schedule OT eval for tomorrow.

## 2020-04-18 NOTE — PLAN OF CARE
Patient groggy much of the morning. Woke up more this afternoon, A/OX4. AVSS weaned off O2 sating well. LS diminished. 2 gram sodium diet with good appetite. Diuresing well, incontinent of urine. No BM's this shift. HGB 7.4, second draw pending patient no longer PCU collect, port not drawing for RN. AX2 turn and repo however refusing repositioning often. CLAY BRONSON. Reports left leg pahantom limb pain, scheduled gabapentin and oral dilaudid with some improvement. Tele: Controlled A-fib. Continue to monitor.

## 2020-04-18 NOTE — PROGRESS NOTES
04/18/20 1000   Quick Adds   Type of Visit PT Re-evaluation   Living Environment   Living Environment Comment See prior note on 4/3/20   Self-Care   Usual Activity Tolerance moderate   Current Activity Tolerance poor   Equipment Currently Used at Home shower chair;walker, rolling;wheelchair, manual   Functional Level Prior   Ambulation 1-->assistive equipment   Transferring 0-->independent   Toileting 1-->assistive equipment   Bathing 1-->assistive equipment   Communication 0-->understands/communicates without difficulty   Swallowing 0-->swallows foods/liquids without difficulty   Cognition 0 - no cognition issues reported   Fall history within last six months yes   Number of times patient has fallen within last six months 2   Prior Functional Level Comment Uses w/c for longer distances outside.    General Information   Onset of Illness/Injury or Date of Surgery - Date 05/31/20   Patient/Family Goals Statement To go home.    Pertinent History of Current Problem (include personal factors and/or comorbidities that impact the POC) Patient was originally evaluated on 4/3/20.    General Observations Patient appears confused. Telling therapist she is interrupting him preparing for a trial. He was actually sleeping when therapist arrived.    General Info Comments Has left prosthesis.    Cognitive Status Examination   Orientation person;place   Level of Consciousness alert   Follows Commands and Answers Questions 50% of the time  (Needs additiional cues. )   Personal Safety and Judgment impaired   Memory impaired   Cognitive Comment Patient doesn't appear to be aware of his deficits and although he can state where he is he reports he is still working as a .    Pain Assessment   Patient Currently in Pain No   Integumentary/Edema   Integumentary/Edema Comments Bilateral LE's very edematous and noted erythema bilateral lower legs. Left residual limb too swollen to don prosthesis.    Posture    Posture Comments Good  sitting posture.    Range of Motion (ROM)   ROM Comment Unable to fully extens right LE in sitting, has left prosthesis.    Strength   Strength Comments Generally weak throughout UE's and LE's. Bilateral hip flex 3-/5, right quads 3-/5 left 3/5, right PF/DF grossly 3/5. Bilateral shoulders 3-/5, biceps 3/5, wrists 3/5, able to make a fist but has difficulty fully extending fingers.    Bed Mobility   Bed Mobility Comments Sup to/from sit with max assist of 2.    Transfer Skills   Transfer Comments Unable to transfer as unable to don left prosthesis.    Balance   Balance Comments Able to maintain static sitting balance but has jerks in trunk and bilateral UE's and LE's which cause instability.    Coordination   Coordination Comments Observed patient eating when therapist first arrived. Patient needing missing the food frequently. Took several tries with fork to get pancake.    Muscle Tone   Muscle Tone Comments Spontaneous jerks in UE's and LE's.    General Therapy Interventions   Planned Therapy Interventions bed mobility training;gait training;strengthening;transfer training   Clinical Impression   Criteria for Skilled Therapeutic Intervention yes, treatment indicated   PT Diagnosis Impaired functional independence   Influenced by the following impairments Weakness throughout UE's and LE's, decreased extension right knee, unable to don prosthesis.    Functional limitations due to impairments Needs mod assist of 2 for bed mobility, unable to transfer or amb due to unable to don prosthesis   Clinical Presentation Evolving/Changing   Clinical Presentation Rationale Multiple co morbidities affecting mobility.    Clinical Decision Making (Complexity) Moderate complexity   Therapy Frequency 5x/week   Predicted Duration of Therapy Intervention (days/wks) 5   Anticipated Discharge Disposition Transitional Care Facility   Risk & Benefits of therapy have been explained Yes   Patient, Family & other staff in agreement with  "plan of care Yes   [facility] AM-PAC  \"6 Clicks\" V.2 Basic Mobility Inpatient Short Form   1. Turning from your back to your side while in a flat bed without using bedrails? 2 - A Lot   2. Moving from lying on your back to sitting on the side of a flat bed without using bedrails? 2 - A Lot   3. Moving to and from a bed to a chair (including a wheelchair)? 1 - Total   4. Standing up from a chair using your arms (e.g., wheelchair, or bedside chair)? 1 - Total   5. To walk in hospital room? 1 - Total   6. Climbing 3-5 steps with a railing? 1 - Total   Basic Mobility Raw Score (Score out of 24.Lower scores equate to lower levels of function) 8   Total Evaluation Time   Total Evaluation Time (Minutes) 12     "

## 2020-04-18 NOTE — PLAN OF CARE
A&Ox3-4, slow speech, forgetful at times. VSS on 1-2L NC. Tele: Afib CVR. Hgb 7.9, 7.6, 7.5. +BS. +Flatus. No BM this shift. Incontinent of bladder, sometimes voids in urinal, teto area red. Edema +2-3. LS clear/diminished, refuses IS. Denies pain. Mech soft diet. Sat at edge of bed with A2 and gait belt. Pulsate.

## 2020-04-18 NOTE — PROGRESS NOTES
"[facility]    Hospitalist Progress Note    Assessment & Plan   Antonio Ramirez is a 75 year old male admitted on 3/31/2020 with complicated medical hx as below on chronic anticoagulation who was admitted with acute on chronic anemia due to rectal bleeding.      Alcohol cirrhosis with persistent ascites, portal hypertensive gastropathy, and assumed coagulapathy.  Alcohol dependence.  Severe acute GI bleed- suspecting small bowel AVM  -Has standing appointment for paracentesis every 2 weeks (last 3/23) since 2016.  - Consume 3-4 vodka drinks per day.   - No desire to quit drinking at this point. INR 3.92 on adm and reversed with vitamin K.  --- had pill cam 4/3; Pill cam read 4/7. Pill did not leave his stomach the 12 hours it was on so no helpful information gained. Repeat pill cam several days later was negative. Discussed with [facility] on 4/16.   --had enteroscopy 4/8, showing some portal gastropathy and stomach, otherwise unremarkable, no bleeding  --- Tagged RBC scan was positive x 2  --Patient had PillCam placed endoscopically 4/9  --patient had a maroon colored stool 04/10; also per Dr. Boyd, \"Still having ongoing intermittent bleeding, etiology unclear. Could be small bowel or colon (only fair prep on colonoscopy) Also had some bloody diarrhea on 4/14 AM  -- repeating tagged rbc scan showed faint bleed in colon or small bowel, may repeat pill cam study again.  - had IR angiogram without any source of bleeding identified: : Negative angiogram for bleed. No active extravasation. No  suggestion of AVM or other obvious etiology for ongoing intermittent  GI bleed.  -- Venofer infusion x 2.  -INR 1.4. has received 10 mg IV Vit K  -S/p EGD and enteroscopy by Dr. Nagy 4/16: + oozing AVMs stomach and jejunum. Sites were treated. Likely cause of bleeding/drop in Hb.   - given albumin iv and started IV lasix diuresis on 4/16 X 2  - colonoscopy done 4/17 showed actively bleeding venous " malformation/Dieulafoy's lesion with active arterial bleeding the distal transverse colon.  Bleeding was stopped completely with argon plasma coagulation, Endo Clip placement and epinephrine injection  -Other findings at colonoscopy included a large amount of old blood mixed with stool throughout the colon and rectal varices with grade 2-3 hemorrhoids  -Continue to monitor hemoglobin every 6 hours  -Continue soft diet, any diet advancement per GI  -Continue octreotide  --continue to transfuse for hemoglobin < 7 g/dl  -- discontinued xarelto, asa on hold    Addendum 4/17:  Discussed colonoscopy results with Dr Nagy, found to have clearly bleeding AVM transverse colon, s/p epi, cauterize and clip. He thinks this is very likely the cause of his GI bleed this hospital stay. Site in transverse colon also coincides with tagged study showing bleeding in this area.   Colon now very clean so should pass clear, pink tinged stool now. If having clearly bloody stools going forward this would indicate rebleed and NOT old blood.   Pt transfused this afternoon. Cont q6 hour Hb levels. Cont octreotide overnight. Start soft diet tonight- GI to order.         Acute on chronic anemia and chronic thrombocytopenia.   -- Chronic component related to ongoing alcohol use, cirrhosis, and CKD and previous bleeding and underlying low grade lymphoproliferative disorder. .  -Baseline hgb around 7.  - DOAC therapy has been discontinued this admission with acute bleeding  - No evidence of hemolysis with negative GISELL, haptoglobin, LDH, and blood smear  - Component of iron deficiency. Iron could be given IV to replace losses from GI bleeding. Given two doses of iv iron this stay  -- 7 to 10 day history of rectal bleeding. Hgb 5.4 on adm with 5 units of PRBC transfusion.  -- EGD did not show source of bleeding. Colonoscopy with red blood in terminal ileum throughout entire examined colon, one 10 mm polyp in the ascending colon (not removed),  "sigmoid diverticulosis, a non-bleeding rectal varix, and non-bleeding external hemorrhoids. Tagged RBC scan suggested bleed in duodenum which was felt inaccurate as EGD showed normal duodenum.  -- changed to po protonix  --transfused 1 unit of PRBC on 4/4, repeated unit 4/8, repeated on 4/15 and 4/16  --Cont hold ASA 81mg daily.  --IV ceftriaxone 2 gram daily for SBP prophylaxis. Now stopped.   --Heme/Onc consult requested, I discussed with Patricio Jones and appreciate his evaluation and recommendations   --A STEFANY studies including beta-2 glycoprotein, anticardiolipin antibodies WAS NEGATIVE and lupus inhibitor was positive.  But at this time given bleeding site not discovered, the risks of anticoagulation are likely too great and he will need to be off anticoagulation.      CAD s/p 3v-CABG.  Ischemic cardiomyopathy with HFrEF.  Severe pulmonary hypertension.  Valvular disorder. Mild to moderate mitral regurgitation, mild mitral stenosis, mild tricuspid regurgitation, and moderate aortic stenosis per echo 2018.  -EF46% Lexiscan 10/2019  -- denies any chest pain or sob  Plan:  -- holding asa  -diuresing now, stopped ivfs 4/16      HTN, DLD.  -- not on any bp medications  -- just diuretics, weight 225 lbs admit weight 222      Hx PEA arrest and VT arrest 6/2019.  Noncompliant in review of most recent cardiology note.  --Tele, I&Os, and daily weights.  --PTA ASA and diuretics on hold as above.      Persistent atrial fibrillation.  Patient stated that she goes in and out of atrial fibrillation.  He is compliant with daily Xarelto.  Rate controlled.     Plan:   --discontinued Xarelto   --Hold aspirin        Antiphospholipid antibody syndrome with hx PE s/p IVC filter (2007).  --discontinued Xarelto as above.  --Hold aspirin as above.  --See discussion above, I appreciate heme/onc recommendations  -per oncology, \"If source of bleeding can be identified and controlled, we can try to carefully resume low dose rivaroxaban. If " "bleeding site not discovered or controllable, then the risks of anticoagulation are likely too great and he will need to be off anticoagulation.  - continue off anticoagulation at present   -holding anticoagulation, will discuss timing of resumption with oncology and GI     CKD, stage III. Baseline Cr 1.5, 1.6-1.8 on admission.   currently Cr 1.5  -- creatinine stable at 1.3-1.5--> 1.5.   -- mucomyst yesterday and today to mitigate post contras wt nephropathy      Chronic neuropathy. Gabapentin.  tolerating      Left BKA.      Hx prostate CA and large cell B-cell non-Hodgkin's lymphoma:  S/p radioactive seeding of the prostate.  Appears to be in remission.    --No interventions necessary at this point.        Severe malnutrition  Malnutrition: (4/7)   % Weight Loss:  > 7.5% in 3 months (severe malnutrition)   % Intake:  </= 50% for >/= 5 days (severe malnutrition) - on average   Subcutaneous Fat Loss:  Unable   Muscle Loss:  Unable   Fluid Retention:  Moderate       Malnutrition Diagnosis: Severe malnutrition      Diet: 2 gram Na diet,  Levin Catheter: not present  DVT Prophylaxis: Pneumatic Compression Devices  Code Status: Full Code confirmed with patient 4/2.     Expected discharge: > 2 days  Lives with wife, has w/c/.  PT, OT , SW consult    aCthie Burgess MD  Text Page  (7am to 6pm)  Interval History   \"I do not think I need anything.\"  Patient had finished breakfast at time of my visit.  He seems mildly confused, denies abdominal pain or shortness of breath.  No bowel movements were noted overnight.      -Data reviewed today: I reviewed all new labs and imaging results over the last 24 hours. I personally reviewed labs from last 24 hours    Physical Exam   Temp: 98.3  F (36.8  C) Temp src: Oral BP: 111/57 Pulse: 68 Heart Rate: 67 Resp: 12 SpO2: 97 % O2 Device: Nasal cannula Oxygen Delivery: 2 LPM  Vitals:    04/16/20 0600 04/17/20 0600 04/18/20 0600   Weight: 102.2 kg (225 lb 5 oz) 99.7 kg (219 lb 12.8 oz) " 100.7 kg (222 lb 0.1 oz)     Vital Signs with Ranges  Temp:  [97.4  F (36.3  C)-98.3  F (36.8  C)] 98.3  F (36.8  C)  Pulse:  [] 68  Heart Rate:  [59-86] 67  Resp:  [8-50] 12  BP: ()/() 111/57  SpO2:  [86 %-100 %] 97 %  I/O last 3 completed shifts:  In: 1400 [P.O.:480; I.V.:320]  Out: 1275 [Urine:1275]    Constitutional: Nad,  Chronically ill appearing  Respiratory: Breathing easily, slightly decrease R base , left lung clear  Cardiovascular: RRR no r/g/m. Not tachy  GI: soft, nt, nd  Skin/Integumen: no rash, significant edema BLE.  Upper extremities  Msk; left BKA  Neuro; mildly confused.  Moves arms and legs, able to feed himself breakfast.    Medications     lactated ringers Stopped (04/16/20 1633)     - MEDICATION INSTRUCTIONS -       octreotide (sandoSTATIN) infusion ADULT 50 mcg/hr (04/17/20 4001)       bisacodyl  10 mg Oral Once     furosemide  40 mg Intravenous Daily     gabapentin  600 mg Oral BID     multivitamin w/minerals  1 tablet Oral Daily     pantoprazole  40 mg Oral BID AC     polyethylene glycol  238 g Oral Once     sodium chloride (PF)  10 mL Intracatheter Q8H     sodium chloride (PF)  10 mL Intracatheter Q8H     sucralfate  1 g Oral 4x Daily AC & HS       Data   Recent Labs   Lab 04/18/20  0610 04/18/20  0020 04/17/20 2050  04/17/20  0545  04/16/20  2130  04/16/20  0613  04/14/20  0816   WBC 7.6  --   --   --  5.5  --  6.9  --  4.7  --  4.6   HGB 7.5* 7.6* 7.9*   < > 6.9*   < > 7.2*   < > 6.3*   < > 6.1*   MCV 91  --   --   --  91  --  90  --  90  --  87     --   --   --  160  --  146*  --  146*  --  167   INR  --   --   --   --   --   --   --   --  1.48*  --  1.37*     --   --   --  143  --   --   --  140   < >  --    POTASSIUM 4.0  --   --   --  4.4  --   --   --  4.4   < >  --    CHLORIDE 109  --   --   --  112*  --   --   --  110*   < >  --    CO2 26  --   --   --  27  --   --   --  25   < >  --    BUN 20  --   --   --  24  --   --   --  24   < >  --    CR  1.63*  --   --   --  1.50*  --   --   --  1.51*   < >  --    ANIONGAP 6  --   --   --  4  --   --   --  5   < >  --    BENNIE 7.6*  --   --   --  7.8*  --   --   --  7.9*   < >  --    GLC 74  --   --   --  118*  --   --   --  107*   < >  --    ALBUMIN  --   --   --   --   --   --   --   --  1.8*  --   --    PROTTOTAL  --   --   --   --   --   --   --   --  4.6*  --   --    BILITOTAL  --   --   --   --   --   --   --   --  0.6  --   --    ALKPHOS  --   --   --   --   --   --   --   --  51  --   --    ALT  --   --   --   --   --   --   --   --  10  --   --    AST  --   --   --   --   --   --   --   --  25  --   --     < > = values in this interval not displayed.       Imaging:   No results found for this or any previous visit (from the past 24 hour(s)).

## 2020-04-18 NOTE — PLAN OF CARE
"Discharge Planner PT   Patient plan for discharge: Home  Current status: Patient seen for re evaluation. Patient had been evaluated on 4/3 and at that time was able to  don his prosthesis with assist of therapis and able amb 40 feet with a ww with assist of 1 with therapist noting patients UE's were shaky. At this time, patient is much more edematous with therapist noting redness throughout left residual limb below his knee and on his right calf. Patients thighs are also edematous and firm laterally. Patient knows his name and states that he is in [facility] but he also states that therapist is interrupting him from \"preparing for my trial.\" Patient needs max assist of 2 for sup to/from sit. He is able to maintain sitting balance but has farily frequent jerky movements especially in UE's but also in trunk that cause instability. He needs a lot of cues and encouragement for participation.   Barriers to return to prior living situation: Assist of 2. Very limited activity tolerance, high fall risk, unable to don prosthesis due to left LE edema  Recommendations for discharge: TCU  Rationale for recommendations: Patient currently is well below baseline and will need continued skilled PT in TCU setting to progress his activity tolerance and independence prior to discharging to home.        Entered by: Maria Luisa Sharma 04/18/2020 11:09 AM      "

## 2020-04-19 NOTE — PLAN OF CARE
OT: Reviewed chart, talked with nursing.  Attempted OT eval.  Pt in room supine in bed.  Sleepy but pleasant overall.  Declined any OOB/EOB activity despite strong encouragement.  Did roll in bed both directions for me and pulled with both hands grabbing my hands to partially sit up in bed.  Declined to try more than that.  Oriented to name, place, and general date.  Not able to participate meaningfully in evaluation at this time.  PT notes indicate pt has declined with functional mobility and ADL skills such as donning his prosthesis, would benefit from further OT evaluation to indicate needs and skills necessary to return to his apartment where he lives with his spouse.

## 2020-04-19 NOTE — PROGRESS NOTES
[facility]  Gastroenterology Progress Note     Antonio Ramirez MRN# [id number]   YOB: 1945 Age: 75 year old          Assessment and Plan:     Rectal bleeding  Patient is clinically doing much better patient has not had any further bleeding for last 2 days patient is more sleepy.  Patient had active bleeding vascular lesion in the mid transverse colon which was treated with endoscopic treatment.  Regarding patient's chronic health problem with portal hypertension cirrhosis ascites anasarca.  Continue on 2 g sodium diet continue on his current treatment continue on diuresis.  I will start him on IV albumin to improve intravascular volume.  Patient will be at high risk for recurrent GI bleed due to AVMs however giving patient's previous history of hypercoagulable state I will defer further anticoagulation discussion and plan up to internal medicine team and hematology.  I will hold on anticoagulation for at least 1 more week.  GI will continue to follow along.            Gastrointestinal hemorrhage with melena  Anemia due to blood loss, acute  Elevated INR  Alcoholic liver disease (H)      Interval History:   doing well; no cp, sob, n/v/d, or abd pain.              Review of Systems:   C: NEGATIVE for fever, chills, change in weight  E/M: NEGATIVE for ear, mouth and throat problems  R: NEGATIVE for significant cough or SOB  CV: NEGATIVE for chest pain, palpitations or peripheral edema             Medications:   I have reviewed this patient's current medications    bisacodyl  10 mg Oral Once     furosemide  40 mg Intravenous Daily     gabapentin  600 mg Oral BID     multivitamin w/minerals  1 tablet Oral Daily     pantoprazole  40 mg Oral BID AC     polyethylene glycol  238 g Oral Once     sodium chloride (PF)  10 mL Intracatheter Q8H     sodium chloride (PF)  10 mL Intracatheter Q8H     sucralfate  1 g Oral 4x Daily AC & HS                  Physical Exam:   Vitals were reviewed  Vital  Signs with Ranges  Temp:  [97.2  F (36.2  C)-98.6  F (37  C)] 97.3  F (36.3  C)  Pulse:  [52-78] 54  Heart Rate:  [49-78] 49  Resp:  [0-29] 0  BP: ()/(49-69) 110/56  SpO2:  [87 %-100 %] 100 %  I/O last 3 completed shifts:  In: 740 [P.O.:740]  Out: 500 [Urine:500]  Constitutional: healthy, alert and no distress   Cardiovascular: negative, PMI normal. No lifts, heaves, or thrills. RRR. No murmurs, clicks gallops or rub  Respiratory: negative, Percussion normal. Good diaphragmatic excursion. Lungs clear  Neck: Neck supple. No adenopathy. Thyroid symmetric, normal size,, Carotids without bruits.  Abdomen: Abdomen soft, non-tender. BS normal. No masses, organomegaly  SKIN: no suspicious lesions or rashes           Data:   I reviewed the patient's new clinical lab test results.   Recent Labs   Lab Test 04/19/20  0645 04/19/20  0105 04/18/20 2008 04/18/20  0610  04/17/20  0545  04/16/20  2130  04/16/20  0613  04/14/20  0816  04/09/20  0612   WBC  --   --   --   --  7.6  --  5.5  --  6.9  --  4.7  --  4.6   < > 4.1   HGB 7.7* 7.2* 7.4*   < > 7.5*   < > 6.9*   < > 7.2*   < > 6.3*   < > 6.1*   < > 7.4*   MCV  --   --   --   --  91  --  91  --  90  --  90  --  87   < > 86   PLT  --   --   --   --  153  --  160  --  146*  --  146*  --  167   < > 144*   INR  --   --   --   --   --   --   --   --   --   --  1.48*  --  1.37*  --  1.40*    < > = values in this interval not displayed.     Recent Labs   Lab Test 04/18/20  0610 04/17/20  0545 04/16/20  0613   POTASSIUM 4.0 4.4 4.4   CHLORIDE 109 112* 110*   CO2 26 27 25   BUN 20 24 24   ANIONGAP 6 4 5     Recent Labs   Lab Test 04/16/20  0613 04/09/20  0612 04/01/20  0636  06/11/19  0220  03/03/19  1320 02/28/19  1635   ALBUMIN 1.8* 2.0* 2.4*   < >  --    < >  --   --    BILITOTAL 0.6 0.7 1.4*   < >  --    < >  --   --    ALT 10 10 11   < >  --    < >  --   --    AST 25 21 25   < >  --    < >  --   --    PROTEIN  --   --   --   --  10*  --  10* 10*    < > = values in this  interval not displayed.       I reviewed the patient's new imaging results.    All laboratory data reviewed  All imaging studies reviewed by me.    Rahul Nagy MD,  4/19/2020  [facility]  Office : [phone]  Cell: [phone]

## 2020-04-19 NOTE — PLAN OF CARE
"Patient is alert and forgetful at times, up with assist of two with  gait belt. On 1-2L of oxygen via nasal cannula. Patient removed pulse oximeter stated that,\" it is unnecessary. \" Tele: Afib CVR. Hgb 7.7,+BS. +Flatus. No BM this shift. Incontinent of bladder, teto area red. Edema +2-3. LS clear/diminished, refuses IS and repositioning.  Denies pain.   "

## 2020-04-19 NOTE — CONSULTS
Care Transition Initial Assessment - SW     Met with: Patient and Family    Active Problems:    Rectal bleeding       DATA  Lives With: spouse   Living Arrangements: apartment  Quality of Family Relationships: supportive, involved  Description of Support System: Supportive, Involved  Who is your support system?: Wife, Children  Support Assessment: Adequate family and caregiver support, Adequate social supports, Patient communicates needs well met.   Identified issues/concerns regarding health management: discharge planning     Quality of Family Relationships: supportive, involved  Transportation Anticipated: family or friend will provide(Wife does all the driving. )    ASSESSMENT  Cognitive Status:  awake, alert and oriented  Concerns to be addressed: discharge planning.    SW received consult order for discharge planning, reviewed notes, TCU recommended. SW met with patient to go over TCU options and discuss discharge planning. Patient was admitted on 03/31/2020 for rectal bleeding. Prior to hospitalization, patient reported he was independent and needed no assistance with ADLs. Patient lives with his wife in an apartment which he reports has a walk-in shower, shower chair and grab bars. Patient uses a walker at baseline. Patient no long drives. Patient reported he wants to go home and doesn't want to go to TCU although it's recommended. Patient reported he only prefers [place] for TCU and wants to leave on 04/20/20 and doesn't care what the MD says. SW offered to call patient's wife for more options but patient requested SW and patient speak to her together. Patient called his wife on the phone and handed phone to FAHEEM. Patient's wife wants him to go to the TCU because she is unable to care for him and doesn't have anyone who can help care for him. Patient's wife provided TCU list - [facility], [facility], [facility] and [facility]. Patient's wife agreeable to [facility]'s daily private room  fee.  Referrals sent via discharge on the double. Transportation TBD.     PLAN  Financial costs for the patient includes TBD.  Patient given options and choices for discharge TCU.  Patient/family is agreeable to the plan?  Yes:   Transportation/person available to transport on day of discharge  is TBD and have they been notified/set up TBD  Patient Goals and Preferences: safe discharge.  Patient anticipates discharging to: TCU.    Will continue to follow.      TREMAYNE Sheppard

## 2020-04-19 NOTE — PLAN OF CARE
A/OX4. AVSS. IMC discontinued by provider. 2 gram sodium diet with good appetite. Diuresing well, incontinent of urine. No BM's this shift. HGB 7.7. AX2, refusing to get out of bed.  L. BKA. Continue to monitor.

## 2020-04-19 NOTE — PROGRESS NOTES
"Patient removed midline and stated that, \" the line is not working and I can do what ever I want.\" Writer redirected and educate patient. Patient was unable to find his cell phone , writer was unable to find . Patient stated that it maybe in a bag his wife collected today and he will call her in the morning.   "

## 2020-04-19 NOTE — PROGRESS NOTES
"[facility]    Hospitalist Progress Note    Assessment & Plan   Antonio Ramirez is a 75 year old male admitted on 3/31/2020 with complicated medical hx as below on chronic anticoagulation who was admitted with acute on chronic anemia due to rectal bleeding.      Alcohol cirrhosis with persistent ascites, portal hypertensive gastropathy, and assumed coagulapathy.  Alcohol dependence.  Severe acute GI bleed- suspecting small bowel AVM  -Has standing appointment for paracentesis every 2 weeks (last 3/23) since 2016.  - Consume 3-4 vodka drinks per day.   - No desire to quit drinking at this point. INR 3.92 on adm and reversed with vitamin K.  --- had pill cam 4/3; Pill cam read 4/7. Pill did not leave his stomach the 12 hours it was on so no helpful information gained. Repeat pill cam several days later was negative. Discussed with [facility] on 4/16.   --had enteroscopy 4/8, showing some portal gastropathy and stomach, otherwise unremarkable, no bleeding  --- Tagged RBC scan was positive x 2  --Patient had PillCam placed endoscopically 4/9  --patient had a maroon colored stool 04/10; also per Dr. Boyd, \"Still having ongoing intermittent bleeding, etiology unclear. Could be small bowel or colon (only fair prep on colonoscopy) Also had some bloody diarrhea on 4/14 AM  -- repeating tagged rbc scan showed faint bleed in colon or small bowel, may repeat pill cam study again.  - had IR angiogram without any source of bleeding identified: : Negative angiogram for bleed. No active extravasation. No  suggestion of AVM or other obvious etiology for ongoing intermittent  GI bleed.  -- Venofer infusion x 2.  -INR 1.4. has received 10 mg IV Vit K  -S/p EGD and enteroscopy by Dr. Nagy 4/16: + oozing AVMs stomach and jejunum. Sites were treated. Likely cause of bleeding/drop in Hb.   - given albumin iv and started IV lasix diuresis on 4/16 X 2  - colonoscopy done 4/17 showed actively bleeding venous " malformation/Dieulafoy's lesion with active arterial bleeding the distal transverse colon.  Bleeding was stopped completely with argon plasma coagulation, Endo Clip placement and epinephrine injection  -Other findings at colonoscopy included a large amount of old blood mixed with stool throughout the colon and rectal varices with grade 2-3 hemorrhoids  - change hemoglobin checks to every 12 hours  -advanced to 2 gram sodium by GI.  I appreciate Dr. Nagy's followup  -Continue octreotide  -continue to transfuse for hemoglobin < 7 g/dl  --discontinued xarelto, asa on hold    Addendum 4/17:  Discussed colonoscopy results with Dr Nagy, found to have clearly bleeding AVM transverse colon, s/p epi, cauterize and clip. He thinks this is very likely the cause of his GI bleed this hospital stay. Site in transverse colon also coincides with tagged study showing bleeding in this area.   Colon now very clean so should pass clear, pink tinged stool now. If having clearly bloody stools going forward this would indicate rebleed and NOT old blood.   Pt transfused this afternoon. Cont q6 hour Hb levels. Cont octreotide overnight.      Acute on chronic anemia and chronic thrombocytopenia.   -- Chronic component related to ongoing alcohol use, cirrhosis, and CKD and previous bleeding and underlying low grade lymphoproliferative disorder. .  -Baseline hgb around 7.  - DOAC therapy has been discontinued this admission with acute bleeding  - No evidence of hemolysis with negative GISELL, haptoglobin, LDH, and blood smear  - Component of iron deficiency. Iron could be given IV to replace losses from GI bleeding. Given two doses of iv iron this stay  -- 7 to 10 day history of rectal bleeding. Hgb 5.4 on adm with 5 units of PRBC transfusion.  -- EGD did not show source of bleeding. Colonoscopy with red blood in terminal ileum throughout entire examined colon, one 10 mm polyp in the ascending colon (not removed), sigmoid diverticulosis, a  "non-bleeding rectal varix, and non-bleeding external hemorrhoids. Tagged RBC scan suggested bleed in duodenum which was felt inaccurate as EGD showed normal duodenum.  -- changed to po protonix  --transfused 1 unit of PRBC on 4/4, repeated unit 4/8, repeated on 4/15 and 4/16  --Cont hold ASA 81mg daily.  --IV ceftriaxone 2 gram daily for SBP prophylaxis. Now stopped.   --Heme/Onc consult requested, I discussed with Patricio Jones and appreciate his evaluation and recommendations   --A STEFANY studies including beta-2 glycoprotein, anticardiolipin antibodies WAS NEGATIVE and lupus inhibitor was positive.  But at this time given bleeding site not discovered, the risks of anticoagulation are likely too great and he will need to be off anticoagulation.      CAD s/p 3v-CABG.  Ischemic cardiomyopathy with HFrEF.  Severe pulmonary hypertension.  Valvular disorder. Mild to moderate mitral regurgitation, mild mitral stenosis, mild tricuspid regurgitation, and moderate aortic stenosis per echo 2018.  -EF46% Lexiscan 10/2019  -- denies any chest pain or sob  Plan:  -- holding asa  -diuresing now, stopped ivfs 4/16      HTN, DLD.  -- not on any bp medications  -- just diuretics, weight 225 lbs admit weight 222      Hx PEA arrest and VT arrest 6/2019.  Noncompliant in review of most recent cardiology note.  --Tele, I&Os, and daily weights.  --PTA ASA and diuretics on hold as above.      Persistent atrial fibrillation.  Patient stated that she goes in and out of atrial fibrillation.  He is compliant with daily Xarelto.  Rate controlled.     Plan:   --discontinued Xarelto   --Hold aspirin        Antiphospholipid antibody syndrome with hx PE s/p IVC filter (2007).  --discontinued Xarelto as above.  --Hold aspirin as above.  --See discussion above, I appreciate heme/onc recommendations  -per oncology, \"If source of bleeding can be identified and controlled, we can try to carefully resume low dose rivaroxaban. If bleeding site not " "discovered or controllable, then the risks of anticoagulation are likely too great and he will need to be off anticoagulation.  - continue off anticoagulation at present   -holding anticoagulation, will discuss timing of resumption with oncology and GI     CKD, stage III. Baseline Cr 1.5, 1.6-1.8 on admission.   currently Cr 1.5  -- creatinine stable at 1.3-1.5--> 1.5.   -- mucomyst yesterday and today to mitigate post contras wt nephropathy      Chronic neuropathy. Gabapentin.  tolerating      Left BKA.      Hx prostate CA and large cell B-cell non-Hodgkin's lymphoma:  S/p radioactive seeding of the prostate.  Appears to be in remission.    --No interventions necessary at this point.        Severe malnutrition  Malnutrition: (4/7)   % Weight Loss:  > 7.5% in 3 months (severe malnutrition)   % Intake:  </= 50% for >/= 5 days (severe malnutrition) - on average   Subcutaneous Fat Loss:  Unable   Muscle Loss:  Unable   Fluid Retention:  Moderate       Malnutrition Diagnosis: Severe malnutrition      Diet: 2 gram Na diet,  Levin Catheter: not present  DVT Prophylaxis: Pneumatic Compression Devices  Code Status: Full Code confirmed with patient 4/2.     Expected discharge: later this week.  PT now strongly advising TCU.  Lives with wife, has w/c/.  PT, OT , SW consult    Cathie Burgess MD  Text Page  (7am to 6pm)  Interval History   \"Deleting it helped.\"  Patient is drowsy, he says leg pain is better, he thinks \"deleting it\" helped (?), then says he is joking.  No black or bloody stools noted.  No new respiratory complaints.     -Data reviewed today: I reviewed all new labs and imaging results over the last 24 hours. I personally reviewed labs from last 24 hours    Physical Exam   Temp: 97.3  F (36.3  C) Temp src: Axillary BP: 105/52 Pulse: 52 Heart Rate: 63 Resp: 14 SpO2: 92 % O2 Device: Nasal cannula Oxygen Delivery: 2 LPM  Vitals:    04/17/20 0600 04/18/20 0600 04/19/20 0635   Weight: 99.7 kg (219 lb 12.8 oz) 100.7 " kg (222 lb 0.1 oz) 101 kg (222 lb 10.6 oz)     Vital Signs with Ranges  Temp:  [97.2  F (36.2  C)-98.6  F (37  C)] 97.3  F (36.3  C)  Pulse:  [52-78] 52  Heart Rate:  [51-78] 63  Resp:  [12-29] 14  BP: ()/(49-69) 105/52  SpO2:  [87 %-97 %] 92 %  I/O last 3 completed shifts:  In: 740 [P.O.:740]  Out: 500 [Urine:500]    Constitutional: Nad,  Chronically ill appearing  Respiratory: Breathing easily, slightly decrease R base , left lung clear  Cardiovascular: RRR no r/g/m. Not tachy  GI: soft, nt, nd  Skin/Integumen: no rash.  Profound edema right lower extremity.  Wearing shinker sock on left BKA  Msk; left BKA  Neuro; mildly confused.  Moves arms and legs, follows simple commands    Medications     lactated ringers Stopped (04/16/20 1633)     - MEDICATION INSTRUCTIONS -         bisacodyl  10 mg Oral Once     furosemide  40 mg Intravenous Daily     gabapentin  600 mg Oral BID     multivitamin w/minerals  1 tablet Oral Daily     pantoprazole  40 mg Oral BID AC     polyethylene glycol  238 g Oral Once     sodium chloride (PF)  10 mL Intracatheter Q8H     sodium chloride (PF)  10 mL Intracatheter Q8H     sucralfate  1 g Oral 4x Daily AC & HS       Data   Recent Labs   Lab 04/19/20  0645 04/19/20  0105 04/18/20 2008 04/18/20  0610  04/17/20  0545  04/16/20  2130  04/16/20  0613  04/14/20  0816   WBC  --   --   --   --  7.6  --  5.5  --  6.9  --  4.7  --  4.6   HGB 7.7* 7.2* 7.4*   < > 7.5*   < > 6.9*   < > 7.2*   < > 6.3*   < > 6.1*   MCV  --   --   --   --  91  --  91  --  90  --  90  --  87   PLT  --   --   --   --  153  --  160  --  146*  --  146*  --  167   INR  --   --   --   --   --   --   --   --   --   --  1.48*  --  1.37*   NA  --   --   --   --  141  --  143  --   --   --  140   < >  --    POTASSIUM  --   --   --   --  4.0  --  4.4  --   --   --  4.4   < >  --    CHLORIDE  --   --   --   --  109  --  112*  --   --   --  110*   < >  --    CO2  --   --   --   --  26  --  27  --   --   --  25   < >  --     BUN  --   --   --   --  20  --  24  --   --   --  24   < >  --    CR  --   --   --   --  1.63*  --  1.50*  --   --   --  1.51*   < >  --    ANIONGAP  --   --   --   --  6  --  4  --   --   --  5   < >  --    BENNIE  --   --   --   --  7.6*  --  7.8*  --   --   --  7.9*   < >  --    GLC  --   --   --   --  74  --  118*  --   --   --  107*   < >  --    ALBUMIN  --   --   --   --   --   --   --   --   --   --  1.8*  --   --    PROTTOTAL  --   --   --   --   --   --   --   --   --   --  4.6*  --   --    BILITOTAL  --   --   --   --   --   --   --   --   --   --  0.6  --   --    ALKPHOS  --   --   --   --   --   --   --   --   --   --  51  --   --    ALT  --   --   --   --   --   --   --   --   --   --  10  --   --    AST  --   --   --   --   --   --   --   --   --   --  25  --   --     < > = values in this interval not displayed.       Imaging:   No results found for this or any previous visit (from the past 24 hour(s)).

## 2020-04-19 NOTE — PROGRESS NOTES
[facility]  Gastroenterology Progress Note     Antonio Ramirez MRN# [id number]   YOB: 1945 Age: 75 year old          Assessment and Plan:     Rectal bleeding  Patient is very sleepy and drowsy patient is otherwise clinically doing well patient is responding to questions with limited answers.  Clinically patient is doing well no further rectal bleeding or bowel movement denying any other new significant GI complaints.  Patient is stable from his cirrhosis standpoint patient is tolerating his diuretics well.          Gastrointestinal hemorrhage with melena  Anemia due to blood loss, acute  Elevated INR  Alcoholic liver disease (H)      Interval History:   doing well; no cp, sob, n/v/d, or abd pain.              Review of Systems:   C: NEGATIVE for fever, chills, change in weight  E/M: NEGATIVE for ear, mouth and throat problems  R: NEGATIVE for significant cough or SOB  CV: NEGATIVE for chest pain, palpitations or peripheral edema             Medications:   I have reviewed this patient's current medications    albumin human  25 g Intravenous Q8H     bisacodyl  10 mg Oral Once     furosemide  40 mg Intravenous Daily     gabapentin  600 mg Oral BID     multivitamin w/minerals  1 tablet Oral Daily     pantoprazole  40 mg Oral BID AC     polyethylene glycol  238 g Oral Once     sodium chloride (PF)  10 mL Intracatheter Q8H     sodium chloride (PF)  10 mL Intracatheter Q8H     spironolactone  100 mg Oral BID     sucralfate  1 g Oral 4x Daily AC & HS                  Physical Exam:   Vitals were reviewed  Vital Signs with Ranges  Temp:  [97.2  F (36.2  C)-98.6  F (37  C)] 97.3  F (36.3  C)  Pulse:  [52-78] 54  Heart Rate:  [49-78] 49  Resp:  [0-29] 0  BP: ()/(49-69) 110/56  SpO2:  [87 %-100 %] 100 %  I/O last 3 completed shifts:  In: 740 [P.O.:740]  Out: 500 [Urine:500]  Cardiovascular: negative, PMI normal. No lifts, heaves, or thrills. RRR. No murmurs, clicks gallops or  rub  Respiratory: negative, Percussion normal. Good diaphragmatic excursion. Lungs clear  Abdomen: Abdomen soft, non-tender. BS normal. No masses, organomegaly  SKIN: no suspicious lesions or rashes             Data:   I reviewed the patient's new clinical lab test results.   Recent Labs   Lab Test 04/19/20  0645 04/19/20  0105 04/18/20 2008 04/18/20  0610  04/17/20  0545  04/16/20  2130  04/16/20  0613  04/14/20  0816  04/09/20  0612   WBC  --   --   --   --  7.6  --  5.5  --  6.9  --  4.7  --  4.6   < > 4.1   HGB 7.7* 7.2* 7.4*   < > 7.5*   < > 6.9*   < > 7.2*   < > 6.3*   < > 6.1*   < > 7.4*   MCV  --   --   --   --  91  --  91  --  90  --  90  --  87   < > 86   PLT  --   --   --   --  153  --  160  --  146*  --  146*  --  167   < > 144*   INR  --   --   --   --   --   --   --   --   --   --  1.48*  --  1.37*  --  1.40*    < > = values in this interval not displayed.     Recent Labs   Lab Test 04/18/20 0610 04/17/20  0545 04/16/20  0613   POTASSIUM 4.0 4.4 4.4   CHLORIDE 109 112* 110*   CO2 26 27 25   BUN 20 24 24   ANIONGAP 6 4 5     Recent Labs   Lab Test 04/16/20  0613 04/09/20  0612 04/01/20  0636  06/11/19  0220  03/03/19  1320 02/28/19  1635   ALBUMIN 1.8* 2.0* 2.4*   < >  --    < >  --   --    BILITOTAL 0.6 0.7 1.4*   < >  --    < >  --   --    ALT 10 10 11   < >  --    < >  --   --    AST 25 21 25   < >  --    < >  --   --    PROTEIN  --   --   --   --  10*  --  10* 10*    < > = values in this interval not displayed.       I reviewed the patient's new imaging results.    All laboratory data reviewed  All imaging studies reviewed by me.    Rahul Nagy MD,  4/19/2020  [facility]  Office : [phone]  Cell: [phone]

## 2020-04-20 NOTE — PLAN OF CARE
Discharge Planner OT   Patient plan for discharge: TCU  Current status: OT eval completed and treatment initiated. Pt completed supine to EOB with SBA using bed rail. Needed assist to don L prosthesis and B shoes while seated. Transferred bed to chair with Mod A of 1, WW, from raised bed. Anticipate he would need A of 2 for sit to stand from chair; updated RN. Pt completed grooming and hygiene tasks with SBA after set up while up in chair. Pt has BLE lymphedema. He reports he has lymph wraps at home but does not use them and he refuses to use them while here.  Barriers to return to prior living situation: Current level of assist, impaired activity tolerance  Recommendations for discharge: TCU  Rationale for recommendations: Pt will benefit from continued inpatient therapies to maximize(I) with ADL and functional mobility. At baseline he is Mod (I) with mobility, has some assist from wife for LE dressing at baseline.        Entered by: Diana Triplett 04/20/2020 12:43 PM

## 2020-04-20 NOTE — PROGRESS NOTES
"Focus:  Order for compression RLE  S:  WOC ordered for above focus. History, progress notes and orders noted. Met briefly with patient today  O: Per pt has hx of lymphedema. 3-4+ edema in RLE. Skin is intact with willis discoloration and some dryness.        Pt very angry and refuses any type of compression. \"leave me alone, I have million compression dressings @ home\"        Lt below knee amp with 3+ edema in thigh noted.   A:  Per pt history of lymphedema. Possible nutrition related.        No skin breakdown noted  I/P:  No skin breakdown so WOC will sign off.         OT/PT applies lymph wraps in the hospital. Will need to place separate order with OT/PT if additional attempts for pt          Compliance requested.      Zuleika Lindquist WOCN            "

## 2020-04-20 NOTE — PLAN OF CARE
Pt is alert and oriented x 4, AVSS, room air. No bloody stool, incontinent of urine at times. Repositioned.

## 2020-04-20 NOTE — PROGRESS NOTES
Chart check    Hgb 7.0  Tagged RBC 4/13 shows suspicious for active GI bleeding, additional GI w/u by Dr Nagy shows active bleeding vascular lesion in the mid transverse colon which was treated with endoscopic treatment. Concern about additional bleeding risk from AVM, portal hypertension.        Assessment & Plan   Antonio Ramirez is a 75 year old male who was admitted on 3/31/2020.     APLA with history of saddle PE  - History of saddle PE 2007 with placement of IVC filter  - History of SDH s/p evacuation in 4/2018  - Has been on low dose rivaroxaban  - Now admitted with GI bleeding and off of DOAC therapy  - Repeat lupus anticoagulant test 4/8/20 positive (anticardiolipin antibodies and beta 2 glycoprotein negative)  - One source of bleeding (mid colon lesion) was identified and treated, GI recommend at least 1 more week off anticoagulation.  - continue off anticoagulation at present, if after 1 week he shows no evidence of bleeding will consider low dose AC (eliquis?) although DOAC are less effictive than coumadin in APLA but I will be concerned about fluctuations of coumadin levels.  [facility] is another option (less convenient).       Anemia: GI w/u active  - acute exacerbation due to GI bleeding  - Has chronic anemia likely from liver disease, previous bleeding.  - No evidence of hemolysis with negative GISELL, haptoglobin, LDH, and blood smear  - Component of iron deficiency. Iron could be given IV to replace losses from GI bleeding.  - Continue close monitoring of blood counts  - Transfuse for hemoglobin < 6.0 (with attempt judicious use with low supply given COVID 19 pandemic), active bleeding or symptomatic anemia.    Thrombocytopenia  -mild and chronic  -most likely due to hypersplenism with cirrhosis and portal hypertension  -monitor    Elevated INR  - Likely from underlying liver disease  - 10 mg vitamin K given 3/31  - INR has improved, but not normalized    GI Bleeding  - mid colon lesion , treated  endoscopically.      Alcoholic Cirrhosis  - per hospitalist    Code Status: Full Code    Zhanna Raymond    Interval History       Physical Exam   Temp: 98  F (36.7  C) Temp src: Oral BP: 104/56 Pulse: 64 Heart Rate: 64 Resp: 16 SpO2: 92 % O2 Device: None (Room air)    Vitals:    04/18/20 0600 04/19/20 0635 04/20/20 0500   Weight: 100.7 kg (222 lb 0.1 oz) 101 kg (222 lb 10.6 oz) 101.2 kg (223 lb 1.7 oz)     Vital Signs with Ranges  Temp:  [97.2  F (36.2  C)-98  F (36.7  C)] 98  F (36.7  C)  Pulse:  [52-64] 64  Heart Rate:  [49-64] 64  Resp:  [0-16] 16  BP: ()/(49-58) 104/56  SpO2:  [92 %-100 %] 92 %  I/O last 3 completed shifts:  In: 320 [P.O.:320]  Out: 1050 [Urine:1050]      Medications     - MEDICATION INSTRUCTIONS -         bisacodyl  10 mg Oral Once     furosemide  40 mg Intravenous Daily     gabapentin  600 mg Oral BID     multivitamin w/minerals  1 tablet Oral Daily     pantoprazole  40 mg Oral BID AC     polyethylene glycol  238 g Oral Once     sodium chloride (PF)  10 mL Intracatheter Q8H     sodium chloride (PF)  10 mL Intracatheter Q8H     spironolactone  100 mg Oral BID     sucralfate  1 g Oral 4x Daily AC & HS       Data   Results for orders placed or performed during the hospital encounter of 03/31/20 (from the past 24 hour(s))   Hemoglobin   Result Value Ref Range    Hemoglobin 7.7 (L) 13.3 - 17.7 g/dL   Hemoglobin   Result Value Ref Range    Hemoglobin 7.0 (L) 13.3 - 17.7 g/dL

## 2020-04-20 NOTE — PROGRESS NOTES
04/20/20 1100   Quick Adds   Type of Visit Initial Occupational Therapy Evaluation   Living Environment   Lives With spouse   Living Arrangements apartment   Self-Care   Usual Activity Tolerance moderate   Current Activity Tolerance poor   Equipment Currently Used at Home wheelchair, manual;shower chair;walker, rolling;grab bar, toilet;grab bar, tub/shower   Activity/Exercise/Self-Care Comment Pt and wife are retired, wife does the driving   Functional Level   Ambulation 1-->assistive equipment   Transferring 1-->assistive equipment   Toileting 1-->assistive equipment   Bathing 1-->assistive equipment   Dressing 2-->assistive person   Eating 0-->independent   Fall history within last six months yes   Number of times patient has fallen within last six months 3   Prior Functional Level Comment Reports 3 recent falls. Uses a w/c for long distance, otherwise uses walker in the apt. Has a cleaning person weekly. Pt does his own meds per report   General Information   Onset of Illness/Injury or Date of Surgery - Date 03/31/20   Referring Physician Daniel Hercules MD   Patient/Family Goals Statement Wants to get home asap; agreeable to rehab only at certain locations   Additional Occupational Profile Info/Pertinent History of Current Problem Antonio Ramirez is a 75 year old male admitted on 3/31/2020 with complicated medical hx as below on chronic anticoagulation who was admitted with acute on chronic anemia due to rectal bleeding. Alcohol cirrhosis with persistent ascites, portal hypertensive gastropathy, and assumed coagulapathy   Precautions/Limitations fall precautions   General Info Comments L BKA with prosthesis   Cognitive Status Examination   Orientation orientation to person, place and time   Level of Consciousness alert   Follows Commands (Cognition) WNL   Cognitive Comment Pt needs info repeated at times   Visual Perception   Visual Perception Wears glasses   Sensory Examination   Sensory Comments  diminished sensation in extremities   Pain Assessment   Patient Currently in Pain Yes, see Vital Sign flowsheet   Integumentary/Edema   Integumentary/Edema Comments BLE chronic lymphedema   Range of Motion (ROM)   ROM Comment BUE AROM intact   Strength   Strength Comments BUE 4+/5   Mobility   Bed Mobility Comments SBA supine to EOB   Transfer Skill: Bed to Chair/Chair to Bed   Level of [place]: Bed to Chair moderate assist (50% patients effort)   Physical Assist/Nonphysical Assist: Bed to Chair verbal cues;set-up required   Assistive Device - Transfer Skill Bed to Chair Chair to Bed Rehab Eval rolling walker   Transfer Skill: Sit to Stand   Level of [place]: Sit/Stand moderate assist (50% patients effort)   Physical Assist/Nonphysical Assist: Sit/Stand verbal cues   Assistive Device for Transfer: Sit/Stand rolling walker   Toilet Transfer   Toilet Transfer Comments Not attempted but would need assist of 2 with bedside commode at this time   Balance   Balance Comments SBA EOB sitting. Mod A for standing transfers   Upper Body Dressing   Level of [place]: Dress Upper Body independent   Physical Assist/Nonphysical Assist: Dress Upper Body set-up required   Lower Body Dressing   Level of [place]: Dress Lower Body maximum assist (25% patients effort)   Physical Assist/Nonphysical Assist: Dress Lower Body verbal cues   Toileting   Level of [place]: Toilet   (SBA to use urinal)   Grooming   Level of [place]: Grooming stand-by assist  (seated)   Physical Assist/Nonphysical Assist: Grooming set-up required   Eating/Self Feeding   Level of [place]: Eating independent   Physical Assist/Nonphysical Assist: Eating set-up required   Instrumental Activities of Daily Living (IADL)   Previous Responsibilities medication management;meal prep   Activities of Daily Living Analysis   Impairments Contributing to Impaired Activities of Daily Living balance impaired;pain;postural control  impaired;sensation decreased;strength decreased   General Therapy Interventions   Planned Therapy Interventions ADL retraining;IADL retraining;balance training;bed mobility training;cognition;strengthening;transfer training;progressive activity/exercise;risk factor education   Clinical Impression   Criteria for Skilled Therapeutic Interventions Met yes, treatment indicated   OT Diagnosis Impaired ADL and mobility   Influenced by the following impairments medical status, BLE lymphedema   Assessment of Occupational Performance 5 or more Performance Deficits   Identified Performance Deficits ADL, IADL, mobility   Clinical Decision Making (Complexity) Moderate complexity   Therapy Frequency 5x/week   Predicted Duration of Therapy Intervention (days/wks) 2 weeks   Anticipated Equipment Needs at Discharge shower chair;raised toilet seat;reacher   Anticipated Discharge Disposition Transitional Care Facility   Risks and Benefits of Treatment have been explained. Yes   Patient, Family & other staff in agreement with plan of care Yes   Total Evaluation Time   Total Evaluation Time (Minutes) 10

## 2020-04-20 NOTE — ANESTHESIA POSTPROCEDURE EVALUATION
Patient: Antonio Ramirez    Procedure(s):  Esophagoscopy, gastroscopy, duodenoscopy (EGD), combined  Esophagogastroduodenoscopy, With Balloon Dilation Of Less Than 30 Millimeters    Diagnosis:GI bleed [K92.2]  Diagnosis Additional Information: No value filed.    Anesthesia Type:  MAC    Note:  Anesthesia Post Evaluation    Last vitals:  Vitals:    04/19/20 1910 04/20/20 0100 04/20/20 0853   BP: 113/58 104/56 118/50   Pulse: 58 64 59   Resp: 16 16 18   Temp: 36.2  C (97.2  F) 36.7  C (98  F) 37.2  C (99  F)   SpO2: 97% 92% 96%         Electronically Signed By: Sonido Latif MD  April 20, 2020  10:20 AM

## 2020-04-20 NOTE — PLAN OF CARE
"PT:  Discharge Planner PT   Patient plan for discharge: Return home  Current status: Pt performed supine to sit with SBA and heavy use of railing, CGA sit to stand with FWW using prosthesis and shoes with height of bed significantly raised (pt refusing to allow PT to lower it.) Pt ambulated 25 ft with FWW and CGA. Participated in standing and seated LE strengthening activities. Sit to supine with min A for LEs. Needs encouragement to keep participating. L LE prosthesis already donned and not fitting well but pt states it is \"good enough.\"  Barriers to return to prior living situation: Needs assist with mobility, limited gait distance thus far, high falls risk, multiple recent falls.  Recommendations for discharge: TCU  Rationale for recommendations: Pt would benefit from continued PT while admitted and at discharge to improve strength, balance, mobility to increase independence, reduce falls risk and increase safety before returning home. Pt stating that it is not the concern of PT if he is \"safe\" to return home and that he \"will be fine at home.\"       Entered by: Marie Nino 04/20/2020 3:30 PM       "

## 2020-04-20 NOTE — PROGRESS NOTES
[facility]  Gastroenterology Progress Note     Antonio Ramirez MRN# [id number]   YOB: 1945 Age: 75 year old          Assessment and Plan:     Rectal bleeding  Patient is awake alert comfortable patient has not had any bowel movement for last couple days patient's hemoglobin is stable since his colonoscopy and cauterization of bleeding AVM lesion.  I will recommend the patient continue on 2 g sodium diet continue on IV albumin and diuresis.  Minimal drop in hemoglobin today is most likely due to IV albumin and dilutional.  Giving patient's chronic liver disease and multiple AVMs patient will be high risk for anticoagulation however patient's this particular episode was due to a lesion in the colon which has been treated and cauterized.  I will defer to further discuss question and risk-benefit on hematology for restarting his anticoagulation.  Patient is clinically stable patient can be discharged from GI standpoint I will recommend follow-up visit in 1 week after discharge from the hospital.  More than 30 minutes were spent with patient mostly counseling and answering his questions.            Gastrointestinal hemorrhage with melena  Anemia due to blood loss, acute  Elevated INR  Alcoholic liver disease (H)      Interval History:   doing well; no cp, sob, n/v/d, or abd pain.              Review of Systems:   C: NEGATIVE for fever, chills, change in weight  E/M: NEGATIVE for ear, mouth and throat problems  R: NEGATIVE for significant cough or SOB  CV: NEGATIVE for chest pain, palpitations or peripheral edema             Medications:   I have reviewed this patient's current medications    bisacodyl  10 mg Oral Once     furosemide  40 mg Intravenous Daily     gabapentin  600 mg Oral BID     multivitamin w/minerals  1 tablet Oral Daily     pantoprazole  40 mg Oral BID AC     polyethylene glycol  238 g Oral Once     sodium chloride (PF)  10 mL Intracatheter Q8H     [START ON 4/21/2020]  spironolactone  100 mg Oral Daily     sucralfate  1 g Oral 4x Daily AC & HS                  Physical Exam:   Vitals were reviewed  Vital Signs with Ranges  Temp:  [97.2  F (36.2  C)-99  F (37.2  C)] 98  F (36.7  C)  Pulse:  [58-66] 59  Heart Rate:  [58-66] 58  Resp:  [16-18] 18  BP: ()/(50-62) 92/51  SpO2:  [92 %-97 %] 94 %  I/O last 3 completed shifts:  In: 560 [P.O.:560]  Out: 900 [Urine:900]  Constitutional: healthy, alert and no distress   Cardiovascular: negative, PMI normal. No lifts, heaves, or thrills. RRR. No murmurs, clicks gallops or rub  Respiratory: negative, Percussion normal. Good diaphragmatic excursion. Lungs clear  Head: Normocephalic. No masses, lesions, tenderness or abnormalities  Abdomen: Abdomen soft, non-tender. BS normal. No masses, organomegaly  SKIN: no suspicious lesions or rashes           Data:   I reviewed the patient's new clinical lab test results.   Recent Labs   Lab Test 04/20/20  0621 04/19/20 2010 04/19/20  0645  04/18/20  0610  04/17/20  0545  04/16/20  2130  04/16/20  0613  04/14/20  0816  04/09/20  0612   WBC  --   --   --   --  7.6  --  5.5  --  6.9  --  4.7  --  4.6   < > 4.1   HGB 7.0* 7.7* 7.7*   < > 7.5*   < > 6.9*   < > 7.2*   < > 6.3*   < > 6.1*   < > 7.4*   MCV  --   --   --   --  91  --  91  --  90  --  90  --  87   < > 86   PLT  --   --   --   --  153  --  160  --  146*  --  146*  --  167   < > 144*   INR  --   --   --   --   --   --   --   --   --   --  1.48*  --  1.37*  --  1.40*    < > = values in this interval not displayed.     Recent Labs   Lab Test 04/18/20  0610 04/17/20  0545 04/16/20  0613   POTASSIUM 4.0 4.4 4.4   CHLORIDE 109 112* 110*   CO2 26 27 25   BUN 20 24 24   ANIONGAP 6 4 5     Recent Labs   Lab Test 04/16/20  0613 04/09/20  0612 04/01/20  0636  06/11/19  0220  03/03/19  1320 02/28/19  1635   ALBUMIN 1.8* 2.0* 2.4*   < >  --    < >  --   --    BILITOTAL 0.6 0.7 1.4*   < >  --    < >  --   --    ALT 10 10 11   < >  --    < >  --   --    AST  25 21 25   < >  --    < >  --   --    PROTEIN  --   --   --   --  10*  --  10* 10*    < > = values in this interval not displayed.       I reviewed the patient's new imaging results.    All laboratory data reviewed  All imaging studies reviewed by me.    Rahul Nagy MD,  4/20/2020  [facility]  Office : [phone]  Cell: [phone]

## 2020-04-20 NOTE — PROGRESS NOTES
"[facility]    Hospitalist Progress Note    Addendum: Discussed with Dr. Nagy at ~1500.  He believes patient is at his baseline.  He is adding nadolol today and will continue diuretics.  He recommends that patient remain off anticoagulants for at least 1 more week.  Patient has colonic AWMs and he is at risk for further GI bleeds in the future.  If anticoagulants are restarted in the future and patient has another bleeding event, he recommends that they be discontinued permanently, but await input from heme/onc.    Patient is medically ready for discharge when TCU is available.    Assessment & Plan   Antonio Ramirez is a 75 year old male admitted on 3/31/2020 with complicated medical hx as below on chronic anticoagulation who was admitted with acute on chronic anemia due to rectal bleeding.      Alcohol cirrhosis with persistent ascites, portal hypertensive gastropathy, and assumed coagulapathy.  Alcohol dependence.  Severe acute GI bleed- suspecting small bowel AVM  -Has standing appointment for paracentesis every 2 weeks (last 3/23) since 2016.  - Consume 3-4 vodka drinks per day.   - No desire to quit drinking at this point. INR 3.92 on adm and reversed with vitamin K.  --- had pill cam 4/3; Pill cam read 4/7. Pill did not leave his stomach the 12 hours it was on so no helpful information gained. Repeat pill cam several days later was negative. Discussed with [facility] on 4/16.   --had enteroscopy 4/8, showing some portal gastropathy and stomach, otherwise unremarkable, no bleeding  --- Tagged RBC scan was positive x 2  --Patient had PillCam placed endoscopically 4/9  --patient had a maroon colored stool 04/10; also per Dr. Boyd, \"Still having ongoing intermittent bleeding, etiology unclear. Could be small bowel or colon (only fair prep on colonoscopy) Also had some bloody diarrhea on 4/14 AM  -- repeating tagged rbc scan showed faint bleed in colon or small bowel, may repeat pill cam study " "again.  - had IR angiogram without any source of bleeding identified: : Negative angiogram for bleed. No active extravasation. No  suggestion of AVM or other obvious etiology for ongoing intermittent  GI bleed.  -- Venofer infusion x 2.  -INR 1.4. has received 10 mg IV Vit K  -S/p EGD and enteroscopy by Dr. Nagy 4/16: + oozing AVMs stomach and jejunum. Sites were treated. Likely cause of bleeding/drop in Hb.   - given albumin iv and started IV lasix diuresis on 4/16 X 2  - colonoscopy done 4/17 showed actively bleeding venous malformation/Dieulafoy's lesion with active arterial bleeding the distal transverse colon.  Bleeding was stopped completely with argon plasma coagulation, Endo Clip placement and epinephrine injection  -Other findings at colonoscopy included a large amount of old blood mixed with stool throughout the colon and rectal varices with grade 2-3 hemorrhoids  --per GI, \"Colon now very clean so should pass clear, pink tinged stool now. If having clearly bloody stools going forward this would indicate rebleed and NOT old blood.\"  - change hemoglobin checks to every 12 hours  -advanced to 2 gram sodium by GI.  I appreciate Dr. Nagy's followup  - octreotide discontinued  - continue to transfuse for hemoglobin < 7 g/dl  --\"Will hold on anticoagulation for at least 1 more week,\" per Dr. Nagy        Acute on chronic anemia and chronic thrombocytopenia.   -- Chronic component related to ongoing alcohol use, cirrhosis, and CKD and previous bleeding and underlying low grade lymphoproliferative disorder. .  -Baseline hgb around 7.  - DOAC therapy has been discontinued this admission with acute bleeding  - No evidence of hemolysis with negative GISELL, haptoglobin, LDH, and blood smear  - Component of iron deficiency. Iron could be given IV to replace losses from GI bleeding. Given two doses of iv iron this stay  -- 7 to 10 day history of rectal bleeding. Hgb 5.4 on adm with 5 units of PRBC " transfusion.  -- EGD did not show source of bleeding. Colonoscopy with red blood in terminal ileum throughout entire examined colon, one 10 mm polyp in the ascending colon (not removed), sigmoid diverticulosis, a non-bleeding rectal varix, and non-bleeding external hemorrhoids. Tagged RBC scan suggested bleed in duodenum which was felt inaccurate as EGD showed normal duodenum.  -- changed to po protonix  --transfused 1 unit of PRBC on 4/4, repeated unit 4/8, repeated on 4/15 and 4/16  --Cont hold ASA 81mg daily.  --IV ceftriaxone 2 gram daily for SBP prophylaxis. Now stopped.   --Heme/Onc consult requested, I discussed with Patricio Jones and appreciate his evaluation and recommendations   --A STEFANY studies including beta-2 glycoprotein, anticardiolipin antibodies WAS NEGATIVE and lupus inhibitor was positive.  But at this time given bleeding site not discovered, the risks of anticoagulation are likely too great and he will need to be off anticoagulation.      CAD s/p 3v-CABG.  Ischemic cardiomyopathy with HFrEF.  Severe pulmonary hypertension.  Valvular disorder. Mild to moderate mitral regurgitation, mild mitral stenosis, mild tricuspid regurgitation, and moderate aortic stenosis per echo 2018.  -EF46% Lexiscan 10/2019  -- denies any chest pain or sob  Plan:  -- holding asa  -diuresing now, stopped ivfs 4/16      HTN, DLD.  -- not on any bp medications  -- just diuretics, weight 225 lbs admit weight 222      Hx PEA arrest and VT arrest 6/2019.  Noncompliant in review of most recent cardiology note.  --Tele, I&Os, and daily weights.  --PTA ASA and diuretics on hold as above.      Persistent atrial fibrillation.  Patient stated that she goes in and out of atrial fibrillation.  He is compliant with daily Xarelto.  Rate controlled.     Plan:   --discontinued Xarelto   --Hold aspirin        Antiphospholipid antibody syndrome with hx PE s/p IVC filter (2007).  --discontinued Xarelto as above.  --Hold aspirin as  "above.  --See discussion above, I appreciate heme/onc recommendations  -per oncology, \"If source of bleeding can be identified and controlled, we can try to carefully resume low dose rivaroxaban. If bleeding site not discovered or controllable, then the risks of anticoagulation are likely too great and he will need to be off anticoagulation.  - continue off anticoagulation at present   -holding anticoagulation, will discuss timing of resumption with oncology and GI     CKD, stage III. Baseline Cr 1.5, 1.6-1.8 on admission.   currently Cr 1.5  -- creatinine stable at 1.3-1.5--> 1.5.   -- mucomyst yesterday and today to mitigate post contras wt nephropathy      Chronic neuropathy. Gabapentin.  tolerating      Left BKA.      Hx prostate CA and large cell B-cell non-Hodgkin's lymphoma:  S/p radioactive seeding of the prostate.  Appears to be in remission.    --No interventions necessary at this point.        Severe malnutrition  Malnutrition: (4/7)   % Weight Loss:  > 7.5% in 3 months (severe malnutrition)   % Intake:  </= 50% for >/= 5 days (severe malnutrition) - on average   Subcutaneous Fat Loss:  Unable   Muscle Loss:  Unable   Fluid Retention:  Moderate       Malnutrition Diagnosis: Severe malnutrition      Diet: 2 gram Na diet,  Levin Catheter: not present  DVT Prophylaxis: Pneumatic Compression Devices  Code Status: Full Code confirmed with patient 4/2.     Expected discharge: later this week.  PT now strongly advising TCU.    Cathie Burgess MD  Text Page  (7am to 6pm)  Interval History    \"The pain in my leg is gone.\"  Patient says he has no idea why he had severe pain in left leg and it has now resolved completely.  He denies shortness of breath, chest discomfort or abdominal complaints.  He again seems dismissive and does not wish to interact, \"what does Dr. Nagy say?\"    -Data reviewed today: I reviewed all new labs and imaging results over the last 24 hours. I personally reviewed labs from last 24 " hours    Physical Exam   Temp: 98  F (36.7  C) Temp src: Oral BP: 104/56 Pulse: 64 Heart Rate: 64 Resp: 16 SpO2: 92 % O2 Device: None (Room air)    Vitals:    04/18/20 0600 04/19/20 0635 04/20/20 0500   Weight: 100.7 kg (222 lb 0.1 oz) 101 kg (222 lb 10.6 oz) 101.2 kg (223 lb 1.7 oz)     Vital Signs with Ranges  Temp:  [97.2  F (36.2  C)-98  F (36.7  C)] 98  F (36.7  C)  Pulse:  [52-64] 64  Heart Rate:  [49-64] 64  Resp:  [0-16] 16  BP: ()/(49-58) 104/56  SpO2:  [92 %-100 %] 92 %  I/O last 3 completed shifts:  In: 320 [P.O.:320]  Out: 1050 [Urine:1050]    Constitutional: Nad,  Chronically ill appearing  Respiratory: Breathing easily, decreased breath sounds anteriorly  Cardiovascular: RRR   GI: soft, nt, nd  Skin/Integumen: no rash.  Profound edema right lower extremity.  Wearing shinker sock on left BKA  Msk; left BKA  Neuro; mildly confused.  Moves arms and legs, follows simple commands    Medications     - MEDICATION INSTRUCTIONS -         bisacodyl  10 mg Oral Once     furosemide  40 mg Intravenous Daily     gabapentin  600 mg Oral BID     multivitamin w/minerals  1 tablet Oral Daily     pantoprazole  40 mg Oral BID AC     polyethylene glycol  238 g Oral Once     sodium chloride (PF)  10 mL Intracatheter Q8H     sodium chloride (PF)  10 mL Intracatheter Q8H     spironolactone  100 mg Oral BID     sucralfate  1 g Oral 4x Daily AC & HS       Data   Recent Labs   Lab 04/20/20  0621 04/19/20 2010 04/19/20  0645  04/18/20  0610  04/17/20  0545  04/16/20  2130  04/16/20  0613  04/14/20  0816   WBC  --   --   --   --  7.6  --  5.5  --  6.9  --  4.7  --  4.6   HGB 7.0* 7.7* 7.7*   < > 7.5*   < > 6.9*   < > 7.2*   < > 6.3*   < > 6.1*   MCV  --   --   --   --  91  --  91  --  90  --  90  --  87   PLT  --   --   --   --  153  --  160  --  146*  --  146*  --  167   INR  --   --   --   --   --   --   --   --   --   --  1.48*  --  1.37*   NA  --   --   --   --  141  --  143  --   --   --  140   < >  --    POTASSIUM  --    --   --   --  4.0  --  4.4  --   --   --  4.4   < >  --    CHLORIDE  --   --   --   --  109  --  112*  --   --   --  110*   < >  --    CO2  --   --   --   --  26  --  27  --   --   --  25   < >  --    BUN  --   --   --   --  20  --  24  --   --   --  24   < >  --    CR  --   --   --   --  1.63*  --  1.50*  --   --   --  1.51*   < >  --    ANIONGAP  --   --   --   --  6  --  4  --   --   --  5   < >  --    BENNIE  --   --   --   --  7.6*  --  7.8*  --   --   --  7.9*   < >  --    GLC  --   --   --   --  74  --  118*  --   --   --  107*   < >  --    ALBUMIN  --   --   --   --   --   --   --   --   --   --  1.8*  --   --    PROTTOTAL  --   --   --   --   --   --   --   --   --   --  4.6*  --   --    BILITOTAL  --   --   --   --   --   --   --   --   --   --  0.6  --   --    ALKPHOS  --   --   --   --   --   --   --   --   --   --  51  --   --    ALT  --   --   --   --   --   --   --   --   --   --  10  --   --    AST  --   --   --   --   --   --   --   --   --   --  25  --   --     < > = values in this interval not displayed.       Imaging:   No results found for this or any previous visit (from the past 24 hour(s)).

## 2020-04-20 NOTE — PLAN OF CARE
RN: pt alert/oriented.  Up to chair with mod assist x1, walker, gait belt.  Changed positions q2h, coccyx with mepilex covering red blanchable area.  Perinal cleanse and protect applied to groin.  Tolerating regular 2gm Na+ diet.  VSS.  Pain managed with PO meds.  Frustrated with length of hospitalization but hopeful to discharge soon.  Hemoglobin recheck this PM.

## 2020-04-20 NOTE — PROGRESS NOTES
"CLINICAL NUTRITION SERVICES - REASSESSMENT NOTE      Recommendations Ordered by Registered Dietitian (RD):     Will send a 4oz PLUS2 supplement with meals (290 cals, 9 gm pro each)     Malnutrition: (4/7)  % Weight Loss:  > 7.5% in 3 months (severe malnutrition)  % Intake:  </= 50% for >/= 5 days (severe malnutrition) - on average  Subcutaneous Fat Loss:  Unable  Muscle Loss:  Unable  Fluid Retention:  Moderate     Malnutrition Diagnosis: Severe malnutrition  In Context of:  Acute illness or injury  Chronic illness or disease       EVALUATION OF PROGRESS TOWARD GOALS   Diet:    3gm Na  Boost supplement discontinued by MD 4/17      Intake/Tolerance:    Chart reviewed  Pt ordering meals  Meal records indicate pt has received average of 2100 cals (93% est needs), 78 gm pro (76% est needs) over the past 2 days  Flowsheets reflect meal intake has been %  Breakfast order today: cereal, 2 pieces Czech toast, banana bread, applesauce, banana    Spoke with pt via phone this morning  Reports tolerating po well  \"The meat is like leather though\"  Didn't really like the Boost supplements - \"Ensure tastes much better\"  He is agreeable to trying another protein drink    Daily multivitamin       ASSESSED NUTRITION NEEDS:  Dosing Weight 86 kg - adjusted for overweight  Estimated Energy Needs: 1589-7632 kcals (25-30 Kcal/Kg)  Justification: maintenance  Estimated Protein Needs: 103-129 grams protein (1.2-1.5 g pro/Kg)  Justification: preservation of lean body mass      NEW FINDINGS:   4/17: colonoscopy --> showed actively bleeding venous malformation/Dieulafoy's lesion with active arterial bleeding the distal transverse colon.  Bleeding was stopped completely with argon plasma coagulation, Endo Clip placement and epinephrine injection    Working on TCU at discharge      Previous Goals (4/13):    Intake of at least 75% meals BID during review period  Evaluation: Met    Previous Nutrition Diagnosis (4/13):   Inadequate oral " intake related to altered GI function with GIB as evidenced by intakes meeting <75% est needs on average x13 day admission, with up to an 11% weight loss in 3 months  Evaluation: Improving        CURRENT NUTRITION DIAGNOSIS  No nutrition diagnosis identified at this time       INTERVENTIONS  Recommendations / Nutrition Prescription  3gm Na diet  Continue multivitamin   Protein drink with meals    Implementation  Will send a 4oz PLUS2 supplement with meals (290 cals, 9 gm pro each)    Goals  Pt to consume 75% meals TID      MONITORING AND EVALUATION:  Progress towards goals will be monitored and evaluated per protocol and Practice Guidelines

## 2020-04-21 NOTE — PROGRESS NOTES
"[facility]    Hospitalist Progress Note    Addendum: Discussed with Dr. Nagy at ~1500.  He believes patient is at his baseline.  He is adding nadolol today and will continue diuretics.  He recommends that patient remain off anticoagulants for at least 1 more week.  Patient has colonic AWMs and he is at risk for further GI bleeds in the future.  If anticoagulants are restarted in the future and patient has another bleeding event, he recommends that they be discontinued permanently, but await input from heme/onc.    Patient is medically ready for discharge when TCU is available.    Assessment & Plan   Antonio Ramirez is a 75 year old male admitted on 3/31/2020 with complicated medical hx as below on chronic anticoagulation who was admitted with acute on chronic anemia due to rectal bleeding.      Alcohol cirrhosis with persistent ascites, portal hypertensive gastropathy, and assumed coagulapathy.  Alcohol dependence.  Severe acute GI bleed- suspecting small bowel AVM  -Has standing appointment for paracentesis every 2 weeks (last 3/23) since 2016.  - Consume 3-4 vodka drinks per day.   - No desire to quit drinking at this point. INR 3.92 on adm and reversed with vitamin K.  --- had pill cam 4/3; Pill cam read 4/7. Pill did not leave his stomach the 12 hours it was on so no helpful information gained. Repeat pill cam several days later was negative. Discussed with [facility] on 4/16.   --had enteroscopy 4/8, showing some portal gastropathy and stomach, otherwise unremarkable, no bleeding  --- Tagged RBC scan was positive x 2  --Patient had PillCam placed endoscopically 4/9  --patient had a maroon colored stool 04/10; also per Dr. Boyd, \"Still having ongoing intermittent bleeding, etiology unclear. Could be small bowel or colon (only fair prep on colonoscopy) Also had some bloody diarrhea on 4/14 AM  -- repeating tagged rbc scan showed faint bleed in colon or small bowel, may repeat pill cam study " "again.  - had IR angiogram without any source of bleeding identified: : Negative angiogram for bleed. No active extravasation. No  suggestion of AVM or other obvious etiology for ongoing intermittent GI bleed.  -- Venofer infusion x 2.  -INR 1.4. has received 10 mg IV Vit K  -S/p EGD and enteroscopy by Dr. Nagy 4/16: + oozing AVMs stomach and jejunum. Sites were treated. Likely cause of bleeding/drop in Hb.   - given albumin iv x2 days, and started IV lasix daily, also on spironolactone.  Due to marked anasarca, I/O indicating minimal diuresis, gave extra furosemide 40 mg IV today.  Patient symptomatic with orthopnea.  - colonoscopy done 4/17 showed actively bleeding venous malformation/Dieulafoy's lesion with active arterial bleeding the distal transverse colon.  Bleeding was stopped completely with argon plasma coagulation, Endo Clip placement and epinephrine injection  -Other findings at colonoscopy included a large amount of old blood mixed with stool throughout the colon and rectal varices with grade 2-3 hemorrhoids  --per GI, \"Colon now very clean so should pass clear, pink tinged stool now. If having clearly bloody stools going forward this would indicate rebleed and NOT old blood.\"  - change hemoglobin checks to every 12 hours  -advanced to 2 gram sodium by GI.  I appreciate Dr. Nagy's followup  - octreotide discontinued  - continue to transfuse for hemoglobin < 7 g/dl  --\"Will hold on anticoagulation for at least 1 more week,\" per Dr. Nagy        Acute on chronic anemia and chronic thrombocytopenia.   -- Chronic component related to ongoing alcohol use, cirrhosis, and CKD and previous bleeding and underlying low grade lymphoproliferative disorder. .  -Baseline hgb around 7.  - DOAC therapy has been discontinued this admission with acute bleeding  - No evidence of hemolysis with negative GISELL, haptoglobin, LDH, and blood smear  - Component of iron deficiency. Iron could be given IV to replace losses " from GI bleeding. Given two doses of iv iron this stay  -- 7 to 10 day history of rectal bleeding. Hgb 5.4 on adm with 5 units of PRBC transfusion.  -- EGD did not show source of bleeding. Colonoscopy with red blood in terminal ileum throughout entire examined colon, one 10 mm polyp in the ascending colon (not removed), sigmoid diverticulosis, a non-bleeding rectal varix, and non-bleeding external hemorrhoids. Tagged RBC scan suggested bleed in duodenum which was felt inaccurate as EGD showed normal duodenum.  -- changed to po protonix  --transfused 1 unit of PRBC on 4/4, repeated unit 4/8, repeated on 4/15 and 4/16  --Cont hold ASA 81mg daily.  --IV ceftriaxone 2 gram daily for SBP prophylaxis. Now stopped.   --Heme/Onc consult requested, I discussed with Patricio Jones and appreciate his evaluation and recommendations   --A STEFANY studies including beta-2 glycoprotein, anticardiolipin antibodies WAS NEGATIVE and lupus inhibitor was positive.  -Per Dr. Raymond 4/20/2020 ' if after 1 week he shows no evidence of bleeding will consider low dose AC (eliquis?) although DOAC are less effictive than coumadin in APLA but I will be concerned about fluctuations of coumadin levels.'  We will continue to assess Hematology opinion regarding restart of anticoagulation, complex GI bleeding as above, other risk factors including alcohol history with presentation with coagulopathy..      CAD s/p 3v-CABG.  Ischemic cardiomyopathy with HFrEF.  Severe pulmonary hypertension.  Valvular disorder. Mild to moderate mitral regurgitation, mild mitral stenosis, mild tricuspid regurgitation, and moderate aortic stenosis per echo 2018.  -EF46% Lexiscan 10/2019  -- denies any chest pain or sob  Plan:  -- holding asa  -diuresing now, stopped ivfs 4/16      HTN, DLD.  -- not on any bp medications  -- just diuretics, weight 225 lbs admit weight 222      Hx PEA arrest and VT arrest 6/2019.  Noncompliant in review of most recent cardiology  "note.  --Tele, I&Os, and daily weights.  --PTA ASA and diuretics on hold as above.      Persistent atrial fibrillation.  Patient stated that he goes in and out of atrial fibrillation.  He was compliant with daily Xarelto, complications of bleeding, see above, other risk factors including alcohol history presentation with coagulopathy..  Rate controlled.     Plan:   --discontinued Xarelto   --Hold aspirin        Antiphospholipid antibody syndrome with hx PE s/p IVC filter (2007).  --discontinued Xarelto as above.  --Hold aspirin as above.  --See discussion above, I appreciate heme/onc recommendations  -per oncology, \"If source of bleeding can be identified and controlled, we can try to carefully resume low dose rivaroxaban. If bleeding site not discovered or controllable, then the risks of anticoagulation are likely too great and he will need to be off anticoagulation.  - continue off anticoagulation at present   -holding anticoagulation, will discuss timing of resumption with oncology and GI  We will continue to assess Hematology opinion regarding restart of anticoagulation, complex GI bleeding as above, other risk factors including alcohol history with presentation with coagulopathy..       CKD, stage III. Baseline Cr 1.5, 1.6-1.8 on admission.   currently Cr 1.5  -- creatinine stable at 1.3-1.5--> 1.5.   -- mucomyst yesterday and today to mitigate post contras wt nephropathy      Chronic neuropathy. Gabapentin.  tolerating      Left BKA.      Hx prostate CA and large cell B-cell non-Hodgkin's lymphoma:  S/p radioactive seeding of the prostate.  Appears to be in remission.    --No interventions necessary at this point.        Severe malnutrition  Malnutrition: (4/7)   % Weight Loss:  > 7.5% in 3 months (severe malnutrition)   % Intake:  </= 50% for >/= 5 days (severe malnutrition) - on average   Subcutaneous Fat Loss:  Unable   Muscle Loss:  Unable   Fluid Retention:  Moderate       Malnutrition Diagnosis: Severe " malnutrition      Diet: 2 gram Na diet,  Levin Catheter: not present  DVT Prophylaxis: Pneumatic Compression Devices  Code Status: Full Code confirmed with patient 4/2.     Expected discharge: later this week.  PT now strongly advising TCU.    Gildardo Nicholas MD  Text Page  (7am to 6pm)  Interval History   Patient is upset, wants to go home.  Reports decreased abdominal pain, no nausea, emesis, eating.  However patient does endorse  2-3 pillow orthopnea, continued 4+ edema in left leg up to groin.  Reports he is urinating on diuresis.  Hemoglobins have remained stable, no melena, hematochezia.     -Data reviewed today: I reviewed all new labs and imaging results over the last 24 hours. I personally reviewed labs from last 24 hours    Physical Exam   Temp: 97.5  F (36.4  C) Temp src: Oral BP: 111/54 Pulse: 59 Heart Rate: 61 Resp: 16 SpO2: 94 % O2 Device: None (Room air)    Vitals:    04/19/20 0635 04/20/20 0500 04/21/20 0700   Weight: 101 kg (222 lb 10.6 oz) 101.2 kg (223 lb 1.7 oz) 101.3 kg (223 lb 5.2 oz)     Vital Signs with Ranges  Temp:  [97.3  F (36.3  C)-98  F (36.7  C)] 97.5  F (36.4  C)  Pulse:  [59] 59  Heart Rate:  [52-62] 61  Resp:  [16-18] 16  BP: ()/(51-63) 111/54  SpO2:  [94 %] 94 %  I/O last 3 completed shifts:  In: 390 [P.O.:390]  Out: 450 [Urine:450]    Constitutional: NAD, alert; angry disposition.   Respiratory: Clear B/L; respiration nonlabored; sitting >45'  Cardiovascular: RRR   GI: soft, nt, nd  Skin/Integumen: no rash. +4 edema right lower extremity to level of groin.  Wearing shinker sock on left BKA  Msk; left BKA  Neuro; .  Moves arms and legs, angry response to all points of discussion and questioning.     Medications     - MEDICATION INSTRUCTIONS -         bisacodyl  10 mg Oral Once     furosemide  40 mg Intravenous Daily     gabapentin  600 mg Oral BID     multivitamin w/minerals  1 tablet Oral Daily     pantoprazole  40 mg Oral BID AC     polyethylene glycol  238 g Oral Once      sodium chloride (PF)  10 mL Intracatheter Q8H     spironolactone  100 mg Oral Daily     sucralfate  1 g Oral 4x Daily AC & HS       Data   Recent Labs   Lab 04/21/20  0623 04/20/20  1737 04/20/20  0621  04/18/20  0610  04/17/20  0545  04/16/20  2130  04/16/20  0613   WBC  --   --   --   --  7.6  --  5.5  --  6.9  --  4.7   HGB 7.2* 7.1* 7.0*   < > 7.5*   < > 6.9*   < > 7.2*   < > 6.3*   MCV  --   --   --   --  91  --  91  --  90  --  90   PLT  --   --   --   --  153  --  160  --  146*  --  146*   INR  --   --   --   --   --   --   --   --   --   --  1.48*   NA  --   --   --   --  141  --  143  --   --   --  140   POTASSIUM  --   --   --   --  4.0  --  4.4  --   --   --  4.4   CHLORIDE  --   --   --   --  109  --  112*  --   --   --  110*   CO2  --   --   --   --  26  --  27  --   --   --  25   BUN  --   --   --   --  20  --  24  --   --   --  24   CR  --   --   --   --  1.63*  --  1.50*  --   --   --  1.51*   ANIONGAP  --   --   --   --  6  --  4  --   --   --  5   BENNIE  --   --   --   --  7.6*  --  7.8*  --   --   --  7.9*   GLC  --   --   --   --  74  --  118*  --   --   --  107*   ALBUMIN  --   --   --   --   --   --   --   --   --   --  1.8*   PROTTOTAL  --   --   --   --   --   --   --   --   --   --  4.6*   BILITOTAL  --   --   --   --   --   --   --   --   --   --  0.6   ALKPHOS  --   --   --   --   --   --   --   --   --   --  51   ALT  --   --   --   --   --   --   --   --   --   --  10   AST  --   --   --   --   --   --   --   --   --   --  25    < > = values in this interval not displayed.       Discussed with Nursing, SW and Patient today

## 2020-04-21 NOTE — PLAN OF CARE
A/OX4. VSS on RA. Tylenol and dilaudid given x1 this shift for pain (phantom limb pain). 2 gram sodium diet, good appetite. Lung sounds diminished. Heart murmur (not new). Uses urinal, incontinent at times. No BM this shift. Assist x1-2, gb, walker. Worked with PT on day shift, rested in bed most of evening. Encouraged shifting weight while in bed. L BKA. Redness on groin, perineal cleanse and protect applied to groin. HGB checks Q12, most recent 7.0 and 7.1.

## 2020-04-21 NOTE — PLAN OF CARE
PT: Returned for second attempt at PT session. Pt states he already was up with OT and sat in the chair. Refuses any activity with PT right now and he wants to stay in bed.

## 2020-04-21 NOTE — PLAN OF CARE
OT- Attempted session. Despite encouragement and education from OT and RN, pt declined to work with OT at this time.

## 2020-04-21 NOTE — PROGRESS NOTES
"Pt refusing full assessment at shift change, assessed what patient allowed nurse to do. After explaining that a saline flush is needed to be given q8h pt said \"if you come back in here in 8 hours, I will kill you.\" Pt refused 0000 saline flush.   "

## 2020-04-21 NOTE — PROGRESS NOTES
SW  D: Call placed to [place] for a follow up on the referral. [facility] does not have bed availability for tomorrow. Call placed to [facility] and left a message with admissions.     P: Will continue to follow     TREMAYNE Mon     [facility]

## 2020-04-21 NOTE — PROGRESS NOTES
Progress Note     Primary Oncologist/Hematologist:  Dr. Raymond          Assessment and Plan:New actions/orders today (04/21/2020) are underlined.     nAtonio Ramirez is a 75 year old male who was admitted on 3/31/2020.      APLA with history of saddle PE  - History of saddle PE 2007 with placement of IVC filter  - History of SDH s/p evacuation in 4/2018  - Has been on low dose rivaroxaban  - Now admitted with GI bleeding and off of DOAC therapy  - Repeat lupus anticoagulant test 4/8/20 positive (anticardiolipin antibodies and beta 2 glycoprotein negative)  - One source of bleeding (mid colon lesion) was identified and treated, GI recommend at least 1 more week off anticoagulation.  - continue off anticoagulation at present, if after 1 week he shows no evidence of bleeding (stable hemoglobin and no evidence of GI bleeding) will consider low dose AC (eliquis 2.5 mg twice daily) although DOAC are less effictive than coumadin in APLA but I will be concerned about fluctuations of coumadin levels.  LMWH is another option (less convenient).    Anemia: GI w/u active  - acute exacerbation due to GI bleeding  - Has chronic anemia likely from liver disease, previous bleeding.  - No evidence of hemolysis with negative GISELL, haptoglobin, LDH, and blood smear  - Component of iron deficiency. Iron could be given IV to replace losses from GI bleeding.  - Continue close monitoring of blood counts  - Transfuse for hemoglobin < 6.0 (with attempt judicious use with low supply given COVID 19 pandemic), active bleeding or symptomatic anemia.     Thrombocytopenia  -mild and chronic  -most likely due to hypersplenism with cirrhosis and portal hypertension  -monitor     Elevated INR  - Likely from underlying liver disease  - 10 mg vitamin K given 3/31  - INR has improved, but not normalized     GI Bleeding  - mid colon lesion , treated endoscopically.      Alcoholic Cirrhosis  - per hospitalist    Patricio Jones APRNICHELLE, CNP  [facility]  [phone] (office), [phone] (cell)        Interval History:     Sage wants to go home. He is not in favor of TCU. We talked about when we'd consider starting anticoagulation.              Review of Systems:     The 5 point Review of Systems is negative other than noted in the HPI             Medications:   Scheduled Medications    bisacodyl  10 mg Oral Once     furosemide  40 mg Intravenous Daily     gabapentin  600 mg Oral BID     multivitamin w/minerals  1 tablet Oral Daily     pantoprazole  40 mg Oral BID AC     polyethylene glycol  238 g Oral Once     sodium chloride (PF)  10 mL Intracatheter Q8H     spironolactone  100 mg Oral Daily     sucralfate  1 g Oral 4x Daily AC & HS     PRN Medications  acetaminophen, bisacodyl, HYDROmorphone, lidocaine 4%, lidocaine (buffered or not buffered), lidocaine (buffered or not buffered), - MEDICATION INSTRUCTIONS -, melatonin, naloxone, nitroGLYcerin, ondansetron **OR** ondansetron, prochlorperazine **OR** prochlorperazine **OR** prochlorperazine, sodium chloride (PF), sodium chloride (PF), sodium chloride (PF)               Physical Exam:   Vitals were reviewed  Blood pressure 111/54, pulse 59, temperature 97.5  F (36.4  C), temperature source Oral, resp. rate 16, weight 101.3 kg (223 lb 5.2 oz), SpO2 94 %.  Wt Readings from Last 4 Encounters:   04/21/20 101.3 kg (223 lb 5.2 oz)   03/11/20 103.8 kg (228 lb 13.4 oz)   03/09/20 108.7 kg (239 lb 11.2 oz)   02/28/20 108.9 kg (240 lb)       I/O last 3 completed shifts:  In: 390 [P.O.:390]  Out: 450 [Urine:450]      Constitutional: Awake, alert, cooperative, no apparent distress            Data:   All laboratory data and imaging studies reviewed.    CMP  Recent Labs   Lab 04/18/20  0610 04/17/20  0545 04/16/20  0613 04/15/20  0645    143 140 141   POTASSIUM 4.0 4.4 4.4 4.3   CHLORIDE 109 112* 110* 111*   CO2 26 27 25 25   ANIONGAP 6 4 5 5   GLC 74 118* 107* 68*   BUN 20 24 24 24   CR 1.63* 1.50* 1.51* 1.50*    GFRESTIMATED 40* 44* 44* 44*   GFRESTBLACK 46* 51* 51* 51*   BENNIE 7.6* 7.8* 7.9* 7.8*   MAG  --  2.4*  --   --    PHOS  --  2.5  --   --    PROTTOTAL  --   --  4.6*  --    ALBUMIN  --   --  1.8*  --    BILITOTAL  --   --  0.6  --    ALKPHOS  --   --  51  --    AST  --   --  25  --    ALT  --   --  10  --      CBC  Recent Labs   Lab 04/21/20  0623 04/20/20  1737 04/20/20  0621 04/19/20 2010 04/18/20  0610  04/17/20  0545  04/16/20 2130 04/16/20 0613   WBC  --   --   --   --   --  7.6  --  5.5  --  6.9  --  4.7   RBC  --   --   --   --   --  2.68*  --  2.49*  --  2.58*  --  2.28*   HGB 7.2* 7.1* 7.0* 7.7*   < > 7.5*   < > 6.9*   < > 7.2*   < > 6.3*   HCT  --   --   --   --   --  24.3*  --  22.6*  --  23.3*  --  20.4*   MCV  --   --   --   --   --  91  --  91  --  90  --  90   MCH  --   --   --   --   --  28.0  --  27.7  --  27.9  --  27.6   MCHC  --   --   --   --   --  30.9*  --  30.5*  --  30.9*  --  30.9*   RDW  --   --   --   --   --  17.5*  --  18.5*  --  18.2*  --  19.0*   PLT  --   --   --   --   --  153  --  160  --  146*  --  146*    < > = values in this interval not displayed.     INR  Recent Labs   Lab 04/16/20 0613   INR 1.48*

## 2020-04-21 NOTE — PLAN OF CARE
Discharge Planner OT   Patient plan for discharge: none stated   Current status: While standing (with bed behind pt), pt completed 1 hygiene task with set up and minimum assist for dynamic standing balance. Pt ambulated to the bathroom with walker and CGA-minimum assist of 1-2. Pt transferred to/from the toilet with minimum assist. Pt required total assist for pericares and clothing management. Pt completed LE dressing with moderate-maximum assist.   Barriers to return to prior living situation: Current level of A for I/ADLs  Recommendations for discharge: TCU  Rationale for recommendations:  Skilled OT in TCU setting to address safety and independence in I/ADLs, as pt is below baseline.        Entered by: Veena Sims 04/21/2020 3:38 PM

## 2020-04-21 NOTE — PLAN OF CARE
A/Ox4. VSS on ra. LS: diminished throughout. BS: hypoactive, flatus +. L BKA, prosthetic on overnight. RLE +3 edmea, pulses +1 palpable. Up with 1-2 GB/Walker. Tolerating a 2g Na+ diet. Right upper PIV, yimi pt refused flush and assessment. Per previous shift nurse, reddened groin area - cleaned per plan of care. Pt refused to let nurse assess coccyx, per previous nurse - mepilex in place. Voiding to urinal. Denies pain. Hgb 7.2 and trending upward. Will continue to monitor.

## 2020-04-22 NOTE — PROGRESS NOTES
Chart Check Heme/Onc:    Notes reviewed. No new recommendations.    Continue off of anticoagulation. If Sage does not have any evidence of GI bleeding and his hemoglobin remains stable, then low dose DOAC (Apixaban 2.5 mg twice daily) could be attempted. If bleeding resumes, then anticoagulation will need to be stopped and not resumed. It is noted that DOAC therapy is less effective in known APLA, but warfarin would likely be unpredictable given issues with alcohol use. LMWH is also an option, likely in prophylactic dosing, but I am not sure that he would take these injections.    Patricio QUESADA, CNP  [facility]  [phone] (office) or [phone] (cell)

## 2020-04-22 NOTE — PROGRESS NOTES
"[facility]    Hospitalist Progress Note    Addendum: Discussed with Dr. Nagy at ~1500.  He believes patient is at his baseline.  He is adding nadolol today and will continue diuretics.  He recommends that patient remain off anticoagulants for at least 1 more week.  Patient has colonic AWMs and he is at risk for further GI bleeds in the future.  If anticoagulants are restarted in the future and patient has another bleeding event, he recommends that they be discontinued permanently, but await input from heme/onc.    Patient is medically ready for discharge when TCU is available.    Assessment & Plan   Antonio Ramirez is a 75 year old male admitted on 3/31/2020 with complicated medical hx as below on chronic anticoagulation who was admitted with acute on chronic anemia due to rectal bleeding.      Alcohol cirrhosis with persistent ascites, portal hypertensive gastropathy, and assumed coagulapathy.  Alcohol dependence.  Severe acute GI bleed- suspecting small bowel AVM  -Has standing appointment for paracentesis every 2 weeks (last 3/23) since 2016.  - Consume 3-4 vodka drinks per day.   - No desire to quit drinking at this point. INR 3.92 on adm and reversed with vitamin K.  --- had pill cam 4/3; Pill cam read 4/7. Pill did not leave his stomach the 12 hours it was on so no helpful information gained. Repeat pill cam several days later was negative. Discussed with [facility] on 4/16.   --had enteroscopy 4/8, showing some portal gastropathy and stomach, otherwise unremarkable, no bleeding  --- Tagged RBC scan was positive x 2  --Patient had PillCam placed endoscopically 4/9  --patient had a maroon colored stool 04/10; also per Dr. Boyd, \"Still having ongoing intermittent bleeding, etiology unclear. Could be small bowel or colon (only fair prep on colonoscopy) Also had some bloody diarrhea on 4/14 AM  -- repeating tagged rbc scan showed faint bleed in colon or small bowel, may repeat pill cam study " "again.  - had IR angiogram without any source of bleeding identified: : Negative angiogram for bleed. No active extravasation. No  suggestion of AVM or other obvious etiology for ongoing intermittent GI bleed.  -- Venofer infusion x 2.  -INR 1.4. has received 10 mg IV Vit K  -S/p EGD and enteroscopy by Dr. Nagy 4/16: + oozing AVMs stomach and jejunum. Sites were treated. Likely cause of bleeding/drop in Hb.   - given albumin iv x2 days, and started IV lasix daily, also on spironolactone.  Due to marked anasarca, I/O indicating minimal diuresis, gave extra furosemide 40 mg IV 4/21/2020.  Patient symptomatic with orthopnea.  -4/22/2020, good diuresis 1.5 L, patient appears stronger; given anasarca will repeat extra furosemide 40 mg IV today, close monitor I's/O, BMP.  - colonoscopy done 4/17 showed actively bleeding venous malformation/Dieulafoy's lesion with active arterial bleeding the distal transverse colon.  Bleeding was stopped completely with argon plasma coagulation, Endo Clip placement and epinephrine injection  -Other findings at colonoscopy included a large amount of old blood mixed with stool throughout the colon and rectal varices with grade 2-3 hemorrhoids  --per GI, \"Colon now very clean so should pass clear, pink tinged stool now. If having clearly bloody stools going forward this would indicate rebleed and NOT old blood.\"  - change hemoglobin checks to every 12 hours  -advanced to 2 gram sodium by GI.  I appreciate Dr. Nagy's followup  - octreotide discontinued  - continue to transfuse for hemoglobin < 7 g/dl  -4/20/2020 Hospitalist discussion with ; GI recommends that patient remain off anticoagulants for at least 1 more week.  Patient has colonic AWMs and he is at risk for further GI bleeds in the future.  If anticoagulants are restarted in the future and patient has another bleeding event, he recommends that they be discontinued permanently, please see Hematology recommendations " "below.    Care Issues;  4/22/2020.    Patient has been upset, wants to go home.  Attempted to update patient today with Nurse, Anuja present on his complex and serious condition[s] including GI bleed[s] in the context of chronic anticoagulation and risk of rebleeding, anasarca and volume overload in patient with known alcoholic cirrhosis and portal hypertension.  Advised if he was to go home, by his wishes AMA concerns of high risk of recurrent GI bleed, thrombosis/stroke given known coagulopathy, and/or complications of fluid overload.  Reviewed plan of GI and Hematology serially monitoring for evidence of recurrent GI bleed and recommendations on trial restart AC if after 1 week serial hemoglobins reflect stability.  In addition discussed with patient advancing IV diuretics given fluid overload situation, see above.  Patient continued to be recalcitrant, long discussion by telephone initially with Patient's wife however due to [place] limiting communication contacted patient's Daughter María updating on patient's condition[s], recommendations of specialist, GI, Hematology and efforts on advancing IV diuresis for fluid overload situation.  Per discussion with Daughter María it appears there has also been malinformation [either incomplete or untrue information] given by patient to his Wife and daughter regarding his condition, for example stating that he is ready to \"go home\"for the last few days or other times 'that he is dying'..  This has compounded establishing treatment plan for patient.  In addition discussed with Daughter that Nursing have had difficulty with the patient regarding disrespectful verbal behavior and discussed with Daughter that it is an expectation that Staff are treated in a respectful manner.  At conclusion daughter María stated that she would communicate with Patient regarding these issues and felt that patient would be compliant.  Discussed outcome of communication with daughter with SW and " "Care Manager.  Monitor.       Acute on chronic anemia and chronic thrombocytopenia.   -- Chronic component related to ongoing alcohol use, cirrhosis, and CKD and previous bleeding and underlying low grade lymphoproliferative disorder. .  -Baseline hgb around 7.  - DOAC therapy has been discontinued this admission with acute bleeding  - No evidence of hemolysis with negative GISELL, haptoglobin, LDH, and blood smear  - Component of iron deficiency. Iron could be given IV to replace losses from GI bleeding. Given two doses of iv iron this stay  -- 7 to 10 day history of rectal bleeding. Hgb 5.4 on adm with 5 units of PRBC transfusion.  -- EGD did not show source of bleeding. Colonoscopy with red blood in terminal ileum throughout entire examined colon, one 10 mm polyp in the ascending colon (not removed), sigmoid diverticulosis, a non-bleeding rectal varix, and non-bleeding external hemorrhoids. Tagged RBC scan suggested bleed in duodenum which was felt inaccurate as EGD showed normal duodenum.  -- changed to po protonix  --transfused 1 unit of PRBC on 4/4, repeated unit 4/8, repeated on 4/15 and 4/16  --Cont hold ASA 81mg daily.  --IV ceftriaxone 2 gram daily for SBP prophylaxis. Now stopped.   --Heme/Onc consult requested, I discussed with Patricio Jones and appreciate his evaluation and recommendations   --A STEFANY studies including beta-2 glycoprotein, anticardiolipin antibodies WAS NEGATIVE and lupus inhibitor was positive.  -Per Dr. Raymond 4/20/2020 ' if after 1 week he shows no evidence of bleeding will consider low dose AC (eliquis?) although DOAC are less effictive than coumadin in APLA but I will be concerned about fluctuations of coumadin levels.'   -4/21/2020 per Hematology   \"If after 1 week patient shows no evidence of bleeding, stable hemoglobin, would consider low-dose AC, Eliquis 2.5 mg twice daily, please see his note stating although DOAC are less effective than Coumadin in a POA concern " "regarding fluctuation in Coumadin'.      CAD s/p 3v-CABG.  Ischemic cardiomyopathy with HFrEF.  Severe pulmonary hypertension.  Valvular disorder. Mild to moderate mitral regurgitation, mild mitral stenosis, mild tricuspid regurgitation, and moderate aortic stenosis per echo 2018.  -EF46% Lexiscan 10/2019  -- denies any chest pain or sob  Plan:  -- holding asa  -diuresing now, stopped ivfs 4/16      HTN, DLD.  -- not on any bp medications  -- just diuretics, weight 225 lbs admit weight 222      Hx PEA arrest and VT arrest 6/2019.  Noncompliant in review of most recent cardiology note.  --Tele, I&Os, and daily weights.  --PTA ASA and diuretics on hold as above.      Persistent atrial fibrillation.  Patient stated that he goes in and out of atrial fibrillation.  He was compliant with daily Xarelto, complications of bleeding, see above, other risk factors including alcohol history presentation with coagulopathy..  Rate controlled.     Plan:   --discontinued Xarelto   --Hold aspirin        Antiphospholipid antibody syndrome with hx PE s/p IVC filter (2007).  --discontinued Xarelto as above.  --Hold aspirin as above.  --See discussion above, I appreciate heme/onc recommendations  -per oncology, \"If source of bleeding can be identified and controlled, we can try to carefully resume low dose rivaroxaban. If bleeding site not discovered or controllable, then the risks of anticoagulation are likely too great and he will need to be off anticoagulation.  - continue off anticoagulation at present   -holding anticoagulation, will discuss timing of resumption with oncology and GI  We will continue to assess Hematology opinion regarding restart of anticoagulation, complex GI bleeding as above, other risk factors including alcohol history with presentation with coagulopathy..       CKD, stage III. Baseline Cr 1.5, 1.6-1.8 on admission.   currently Cr 1.5  -- creatinine stable at 1.3-1.5--> 1.5.   -- mucomyst yesterday and today to " mitigate post contras wt nephropathy      Chronic neuropathy. Gabapentin.  tolerating      Left BKA.      Hx prostate CA and large cell B-cell non-Hodgkin's lymphoma:  S/p radioactive seeding of the prostate.  Appears to be in remission.    --No interventions necessary at this point.        Severe malnutrition  Malnutrition: (4/7)   % Weight Loss:  > 7.5% in 3 months (severe malnutrition)   % Intake:  </= 50% for >/= 5 days (severe malnutrition) - on average   Subcutaneous Fat Loss:  Unable   Muscle Loss:  Unable   Fluid Retention:  Moderate       Malnutrition Diagnosis: Severe malnutrition      Diet: 2 gram Na diet,  Levin Catheter: not present  DVT Prophylaxis: Pneumatic Compression Devices  Code Status: Full Code confirmed with patient 4/2.     Expected discharge: later this week.  PT now strongly advising TCU.    Gildardo Nicholas MD  Text Page  (7am to 6pm)  Interval History   Patient is upset, wants to go home.  Reports decreased abdominal pain, no nausea, emesis, eating.  However patient does endorse  2-3 pillow orthopnea, continued 4+ edema in left leg up to groin.  Reports he is urinating on diuresis.  Hemoglobins have remained stable, no melena, hematochezia.     -Data reviewed today: I reviewed all new labs and imaging results over the last 24 hours. I personally reviewed labs from last 24 hours    Physical Exam   Temp: 98.3  F (36.8  C) Temp src: Oral BP: 108/60 Pulse: 60 Heart Rate: 59 Resp: 16 SpO2: 100 % O2 Device: None (Room air)    Vitals:    04/20/20 0500 04/21/20 0700 04/22/20 0500   Weight: 101.2 kg (223 lb 1.7 oz) 101.3 kg (223 lb 5.2 oz) 101.4 kg (223 lb 8.7 oz)     Vital Signs with Ranges  Temp:  [97.3  F (36.3  C)-98.4  F (36.9  C)] 98.3  F (36.8  C)  Pulse:  [54-60] 60  Heart Rate:  [55-70] 59  Resp:  [14-16] 16  BP: (103-113)/(47-60) 108/60  SpO2:  [92 %-100 %] 100 %  I/O last 3 completed shifts:  In: 120 [P.O.:120]  Out: 1725 [Urine:1725]    Constitutional: NAD, alert; angry disposition.    Respiratory: Clear B/L; respiration nonlabored; sitting >45'  Cardiovascular: RRR   GI: soft, nt, nd  Skin/Integumen: no rash. +4 edema right lower extremity to level of groin.  Wearing shinker sock on left BKA  Msk; left BKA  Neuro; .  Moves arms and legs, angry response to all points of discussion and questioning.     Medications     - MEDICATION INSTRUCTIONS -         bisacodyl  10 mg Oral Once     furosemide  40 mg Intravenous Daily     gabapentin  600 mg Oral BID     multivitamin w/minerals  1 tablet Oral Daily     pantoprazole  40 mg Oral BID AC     polyethylene glycol  238 g Oral Once     sodium chloride (PF)  10 mL Intracatheter Q8H     spironolactone  100 mg Oral Daily     sucralfate  1 g Oral 4x Daily AC & HS       Data   Recent Labs   Lab 04/22/20  0556 04/21/20  1820 04/21/20  0623  04/18/20  0610  04/17/20  0545  04/16/20  2130  04/16/20  0613   WBC  --   --   --   --  7.6  --  5.5  --  6.9  --  4.7   HGB 7.3* 7.8* 7.2*   < > 7.5*   < > 6.9*   < > 7.2*   < > 6.3*   MCV  --   --   --   --  91  --  91  --  90  --  90   PLT  --   --   --   --  153  --  160  --  146*  --  146*   INR  --   --   --   --   --   --   --   --   --   --  1.48*     --   --   --  141  --  143  --   --   --  140   POTASSIUM 4.1  --   --   --  4.0  --  4.4  --   --   --  4.4   CHLORIDE 107  --   --   --  109  --  112*  --   --   --  110*   CO2 26  --   --   --  26  --  27  --   --   --  25   BUN 23  --   --   --  20  --  24  --   --   --  24   CR 1.62*  --   --   --  1.63*  --  1.50*  --   --   --  1.51*   ANIONGAP 4  --   --   --  6  --  4  --   --   --  5   BENNIE 7.7*  --   --   --  7.6*  --  7.8*  --   --   --  7.9*   GLC 87  --   --   --  74  --  118*  --   --   --  107*   ALBUMIN  --   --   --   --   --   --   --   --   --   --  1.8*   PROTTOTAL  --   --   --   --   --   --   --   --   --   --  4.6*   BILITOTAL  --   --   --   --   --   --   --   --   --   --  0.6   ALKPHOS  --   --   --   --   --   --   --   --   --   --   51   ALT  --   --   --   --   --   --   --   --   --   --  10   AST  --   --   --   --   --   --   --   --   --   --  25    < > = values in this interval not displayed.         Total unit/floor time 35 minutes:  time consisted of the following, examination of patient, review of records including labs, imaging results, medications, interdisciplinary notes and completing documentation; > 50% Counseling/discussion with Sharantent with Nursing present regarding current condition, see above, and Coordination of Care time with SW and CM regarding active medical issues, see above including management and surveillance.    Additional Prolonged Time:  >35mins   [non face to face] for discussion with Patient's Daughter María by phone to discuss medical care issues; see summary highlighted above. .

## 2020-04-22 NOTE — PLAN OF CARE
"Pt is extremely irritable and agitated. When approached for midnight assessment, vital signs, and to flush IV pt very adamantly refused. When I said \"I am just going to grab a quick set of vitals\", he replied with \"the hell you are, I refuse! Now leave me alone!\".   "

## 2020-04-22 NOTE — PLAN OF CARE
A&O. VSS. Denies pain. Irritable. Uncooperative with full assessment, refused auscultation to posterior lungs. Very infrequent loose cough. Denies SOB. Pt states he has had cough for about 1-2 days. This was mentioned to NP, who states pt should continue to be monitored on 33, no need for testing right now.  Abdominal ascites. +3 BLE edema. Hemoglobin trending up.

## 2020-04-22 NOTE — PLAN OF CARE
Pt is A+Ox4. Pt was agitated and uncooperative overnight. Pt refused vitals and assessments. Pt requested assistance this morning to remove his prosthetic leg - at this time I was able to assess his legs. His remaining leg stump had redness, and was extremely edematous above the knee. Pt is not accepting of advice to elevate legs or to reposition. Pt refuses repositioning. Unsure if patient would benefit from orders to not disturb overnight or if he needs a conversation with a provider about expectations of care.

## 2020-04-22 NOTE — PLAN OF CARE
A/Ox4. AVSS. Up with AX2 walker and GB. +3-4 edema in BLE. Receiving lasix, voiding adequately. 2 gram sodium diet, good appetite. Denies pain. Hemoglobin remaining stable 7.3. One soft but fromed BM today, no blood noted. Upset this morning after talking with hospitalist. Wants to go home, but was explained the medical risks for going home too early. Patient's wife and daughter updated, and wanting patient to stay. Is agreeable with staying to continue monitoring.

## 2020-04-22 NOTE — PLAN OF CARE
"OT: pt in bed, very upset about not being able to discharge, pt refused all attempts to engage in OT session with pt stating \"I am not going to do anything.    "

## 2020-04-22 NOTE — PROGRESS NOTES
"SW  D: Call placed to patient's wife to speak with her regarding patient to attend TCU. Informed by hospitality that patient is adamantly refusing TCU. Hospitalist reports home would not be a safe discharge, so they wanted to speak with patient family regarding this concern. Patient's wife answered the phone but could not hear writer. Writer called on land line and mobile phone. Writer was going to reach out to patient's daughter, but heard the hospitalist reaching out to daughter via phone.     P: Will continue to follow     TREMAYNE Mon     [facility]    Addendum: Call placed to [facility] admissions. Writer left a voicemail with Dony in admissions. Call placed to patient;s daughter to see if she had thoughts on discharge plans. Writer left a voicemail.    Writer received a call back from patient's wife and inquired about discharge planning. Patient's wife reports she would prefer patient not go to the \"[facility]\" that is a nursing home. Patient's wife reports if patient can walk she would take him at home. Expressed to patient's wife that patient has not been participating in therapy for the last two days, so he is not improving his strength/mobility. Patient's wife was unaware of this. She reports she would reach out to patient and encourage him to participate. Patient's wife also reports patient is planned to stay in the hospital until Monday and discharge planning  would continue from there. Writer gave patient's wife the phone number listed in the chart.  Patient's wife called writer back and reports [phone] is the current number for patient's room phone. Patient's wife reports he tried to motivate patient to work with therapy so he can transition home.      TREMAYNE Mon     [facility]    "

## 2020-04-23 NOTE — PLAN OF CARE
"  Discharge Planner PT   Patient plan for discharge: Return to home  Current status: Greeted patient supine in bed with HOB elevated, requiring significant encouragement from therapist & nursing staff for therapy. Educated patient on the importance of out of bed mobility for maintaining muscle strength - patient not receptive to education. Dependent donning of sock & liner. Attempted to don prosthetic - patient indicating that his limb is too swollen and the prosthetic will not go on. Patient getting agitated and refusing further therapy. Offered patient to assist with repositioning in bed to off load pressure off of right foot hitting bed end board - patient refusing repositioning despite education of preventing pressure injury. Concluded session with patient sitting up in bed, all needs in reach and nursing staff aware of status.   Barriers to return to prior living situation: Needs assist with mobility & donning/doffing prosthesis, limited gait distance, high falls risk, multiple recent falls.  Recommendations for discharge: TCU  Rationale for recommendations: Pt would benefit from continued PT while admitted and at discharge to improve strength, balance, mobility to increase independence, reduce falls risk and increase safety before returning home. Pt stating that it is not the concern of PT if he is \"safe\" to return home and that he \"will be fine at home.\"       Entered by: Lashell Laureano 04/23/2020 3:14 PM       "

## 2020-04-23 NOTE — PROGRESS NOTES
Chart Check Heme/Onc:    Notes reviewed. No new recommendations.     Continue off of anticoagulation.     If [facility] does not have any evidence of GI bleeding and his hemoglobin remains stable for at least 1 week, then low dose DOAC (Apixaban 2.5 mg twice daily) could be attempted. If bleeding resumes while on anticoagulation, then anticoagulation will need to be stopped and permanently discontinued.     It is noted that DOAC therapy is less effective in known APLA, but warfarin would likely be unpredictable given issues with alcohol use. LMWH is also an option, likely in prophylactic dosing, but I am not sure that he would take these injections.     Patricio QUESADA, CNP  [facility]  [phone] (office) or [phone] (cell)

## 2020-04-23 NOTE — PLAN OF CARE
VSS on RA. A/Ox 4. Labs: Hgb check in AM. CMS intact. Pt denied pain overnight. Up with 2 GB/W. Tolerating 2g sodium diet. Denied N&V. +gas, no bm on shift. Voiding adequate with urinal. LS clear. Pt very frustrated during shift, denied a lot of extra care offered and was unhappy about assessments done. Did not want anyone to come in to room after night time meds until the morning. Refused focused assessments after initial one completed. Will continue to monitor.

## 2020-04-23 NOTE — PLAN OF CARE
VSS. RA. A&Ox4, agitated, rude, refusing repositioning. Pt would get up Ax2 GB walker and prosthetic but pt refused to get up, says LLE too swollen for prosthetic. Can be incontinent at times, otherwise voiding adequate amounts. Medium formed BM. Excoriated scrotum, miconazole ordered, baby powder applied currently. Pt refused daily weight. discharge pending.

## 2020-04-23 NOTE — PROGRESS NOTES
"[facility]    Hospitalist Progress Note    Addendum: Discussed with Dr. Nagy at ~1500.  He believes patient is at his baseline.  He is adding nadolol today and will continue diuretics.  He recommends that patient remain off anticoagulants for at least 1 more week.  Patient has colonic AWMs and he is at risk for further GI bleeds in the future.  If anticoagulants are restarted in the future and patient has another bleeding event, he recommends that they be discontinued permanently, but await input from heme/onc.    Patient is medically ready for discharge when TCU is available.    Assessment & Plan   Antonio Ramirez is a 75 year old male admitted on 3/31/2020 with complicated medical hx as below on chronic anticoagulation who was admitted with acute on chronic anemia due to rectal bleeding.      Alcohol cirrhosis with persistent ascites, portal hypertensive gastropathy, and assumed coagulapathy.  Alcohol dependence.  Severe acute GI bleed- suspecting small bowel AVM  -Has standing appointment for paracentesis every 2 weeks (last 3/23) since 2016.  - Consume 3-4 vodka drinks per day.   - No desire to quit drinking at this point. INR 3.92 on adm and reversed with vitamin K.  --- had pill cam 4/3; Pill cam read 4/7. Pill did not leave his stomach the 12 hours it was on so no helpful information gained. Repeat pill cam several days later was negative. Discussed with [facility] on 4/16.   --had enteroscopy 4/8, showing some portal gastropathy and stomach, otherwise unremarkable, no bleeding  --- Tagged RBC scan was positive x 2  --Patient had PillCam placed endoscopically 4/9  --patient had a maroon colored stool 04/10; also per Dr. Boyd, \"Still having ongoing intermittent bleeding, etiology unclear. Could be small bowel or colon (only fair prep on colonoscopy) Also had some bloody diarrhea on 4/14 AM  -- repeating tagged rbc scan showed faint bleed in colon or small bowel, may repeat pill cam study " "again.  - had IR angiogram without any source of bleeding identified: : Negative angiogram for bleed. No active extravasation. No  suggestion of AVM or other obvious etiology for ongoing intermittent GI bleed.  -- Venofer infusion x 2.  -INR 1.4. has received 10 mg IV Vit K  -S/p EGD and enteroscopy by Dr. Nagy 4/16: + oozing AVMs stomach and jejunum. Sites were treated. Likely cause of bleeding/drop in Hb.   - given albumin iv x2 days, and started IV lasix daily, also on spironolactone.  Due to marked anasarca, I/O indicating minimal diuresis, gave extra furosemide 40 mg IV x 2 on  4/21/2020 and 4/22/20 with good diuresis at 1.5 L and 2.0 L.  We will continue maintenance diuresis at furosemide 40 mg IV daily and spironolactone.  Close monitor I's/O's, BMP.  - colonoscopy done 4/17 showed actively bleeding venous malformation/Dieulafoy's lesion with active arterial bleeding the distal transverse colon.  Bleeding was stopped completely with argon plasma coagulation, Endo Clip placement and epinephrine injection  -Other findings at colonoscopy included a large amount of old blood mixed with stool throughout the colon and rectal varices with grade 2-3 hemorrhoids  --per GI, \"Colon now very clean so should pass clear, pink tinged stool now. If having clearly bloody stools going forward this would indicate rebleed and NOT old blood.\"  - change hemoglobin checks to daily.  -advanced to 2 gram sodium by GI.  I appreciate Dr. Nagy's followup  - octreotide discontinued  - continue to transfuse for hemoglobin < 7 g/dl  -4/20/2020 Hospitalist discussion with ; GI recommends that patient' remain off anticoagulants for at least 1 more week.  Patient has colonic AWMs and he is at risk for further GI bleeds in the future'.  If anticoagulants are restarted in the future and patient has another bleeding event, he recommends that they be discontinued permanently, in addition please see Hematology recommendations " "below.    Care Issues;  4/22/2020.    Patient has been upset, wants to go home.  Attempted to update patient with Nurse, Anuja present on his complex and serious condition[s] including GI bleed[s] in the context of chronic anticoagulation and risk of rebleeding, anasarca and volume overload in patient with known alcoholic cirrhosis and portal hypertension.  Advised if he was to go home, by his wishes AMA concerns of high risk of recurrent GI bleed, potential thrombosis/stroke given known coagulopathy, and/or complications of fluid overload.  Reviewed plan of GI and Hematology serially monitoring for evidence of recurrent GI bleed and recommendations on trial restart AC if after 1 week serial hemoglobins reflect stability.  In addition discussed with patient advancing IV diuretics given fluid overload situation, see above.  Patient continued to be recalcitrant, long discussion by telephone initially with Patient's wife however due to [place] limiting communication contacted patient's Daughter María updating on patient's condition[s], recommendations of specialist, GI, Hematology and efforts on advancing IV diuresis for fluid overload situation.  Per discussion with Daughter María it appears there has also been malinformation [either incomplete or untrue information] given by patient to his Wife and daughter regarding his condition, for example stating that he is ready to \"go home\"for the last few days or other times 'that he is dying'..  This has compounded establishing treatment plan for patient.  In addition discussed with Daughter that Nursing have had difficulty with the patient regarding disrespectful verbal behavior and discussed with Daughter that it is an expectation that Staff are treated in a respectful manner.  At conclusion daughter María stated that she would communicate with Patient regarding these issues and felt that patient would be compliant.  Discussed outcome of communication with daughter with SW and " "Care Manager.  Monitor.       Acute on chronic anemia and chronic thrombocytopenia.   -- Chronic component related to ongoing alcohol use, cirrhosis, and CKD and previous bleeding and underlying low grade lymphoproliferative disorder. .  -Baseline hgb around 7.  - DOAC therapy has been discontinued this admission with acute bleeding  - No evidence of hemolysis with negative GISELL, haptoglobin, LDH, and blood smear  - Component of iron deficiency. Iron could be given IV to replace losses from GI bleeding. Given two doses of iv iron this stay  -- 7 to 10 day history of rectal bleeding. Hgb 5.4 on adm with 5 units of PRBC transfusion.  -- EGD did not show source of bleeding. Colonoscopy with red blood in terminal ileum throughout entire examined colon, one 10 mm polyp in the ascending colon (not removed), sigmoid diverticulosis, a non-bleeding rectal varix, and non-bleeding external hemorrhoids. Tagged RBC scan suggested bleed in duodenum which was felt inaccurate as EGD showed normal duodenum.  -- changed to po protonix  --transfused 1 unit of PRBC on 4/4, repeated unit 4/8, repeated on 4/15 and 4/16  --Cont hold ASA 81mg daily.  --IV ceftriaxone 2 gram daily for SBP prophylaxis. Now stopped.   --Heme/Onc consult requested, I discussed with Patricio Jones and appreciate his evaluation and recommendations   --A STEFANY studies including beta-2 glycoprotein, anticardiolipin antibodies WAS NEGATIVE and lupus inhibitor was positive.  -Per Dr. Raymond 4/20/2020 ' if after 1 week he shows no evidence of bleeding will consider low dose AC (eliquis?) although DOAC are less effictive than coumadin in APLA but I will be concerned about fluctuations of coumadin levels.'   -4/21/2020 per Hematology   \"If after 1 week patient shows no evidence of bleeding, stable hemoglobin, would consider low-dose AC, Eliquis 2.5 mg twice daily, please see his note 4/21/2020 and 4/[id number]; also  stating although DOAC are less effective than " "Coumadin in a POA concern regarding fluctuation in Coumadin'.      CAD s/p 3v-CABG.  Ischemic cardiomyopathy with HFrEF.  Severe pulmonary hypertension.  Valvular disorder. Mild to moderate mitral regurgitation, mild mitral stenosis, mild tricuspid regurgitation, and moderate aortic stenosis per echo 2018.  -EF46% Lexiscan 10/2019  -- denies any chest pain or sob  Plan:  -- holding asa  -diuresing now, stopped ivfs 4/16      HTN, DLD.  -- not on any bp medications  -- just diuretics, weight 225 lbs admit weight 222      Hx PEA arrest and VT arrest 6/2019.  Noncompliant in review of most recent cardiology note.  --Tele, I&Os, and daily weights.  --PTA ASA and diuretics on hold as above.      Persistent atrial fibrillation.  Patient stated that he goes in and out of atrial fibrillation.  He was compliant with daily Xarelto, complications of bleeding, see above, other risk factors including alcohol history presentation with coagulopathy..  Rate controlled.   Plan:   --discontinued Xarelto   --Hold aspirin        Antiphospholipid antibody syndrome with hx PE s/p IVC filter (2007).  --discontinued Xarelto as above.  --Hold aspirin as above.  --See discussion above, I appreciate heme/onc recommendations  -per oncology, \"If source of bleeding can be identified and controlled, we can try to carefully resume low dose rivaroxaban. If bleeding site not discovered or controllable, then the risks of anticoagulation are likely too great and he will need to be off anticoagulation.  - continue off anticoagulation at present   -holding anticoagulation, will discuss timing of resumption with oncology and GI, see notes above.         CKD, stage III. Baseline Cr 1.5, 1.6-1.8 on admission.   currently Cr 1.5  -- creatinine stable at 1.3-1.5--> 1.5.   -- mucomyst yesterday and today to mitigate post contras wt nephropathy      Chronic neuropathy. Gabapentin.  tolerating      Left BKA.      Hx prostate CA and large cell B-cell " "non-Hodgkin's lymphoma:  S/p radioactive seeding of the prostate.  Appears to be in remission.    --No interventions necessary at this point.        Severe malnutrition  Malnutrition: (4/7)   % Weight Loss:  > 7.5% in 3 months (severe malnutrition)   % Intake:  </= 50% for >/= 5 days (severe malnutrition) - on average   Subcutaneous Fat Loss:  Unable   Muscle Loss:  Unable   Fluid Retention:  Moderate       Malnutrition Diagnosis: Severe malnutrition      Diet: 2 gram Na diet,  Levin Catheter: not present  DVT Prophylaxis: Pneumatic Compression Devices  Code Status: Full Code confirmed with patient 4/2.     Expected discharge: later this week.  PT now strongly advising TCU.    Gildardo Nicholas MD  Text Page  (7am to 6pm)  Interval History   Patient continues with angry disposition.  His response to successful 2 L diuresis is 'that is part of the ongoing cover up\".  Nursing reports ongoing difficulties with care with patient, stating when noncompliant to repositioning per Nursing to prevent pressure sores he states\" that would make you happy\".  No new complaints, no PND, orthopnea.  No abdominal pain, nausea, emesis.  No melanotic stools, patient eating.     -Data reviewed today: I reviewed all new labs and imaging results over the last 24 hours. I personally reviewed labs from last 24 hours    Physical Exam   Temp: 97.6  F (36.4  C) Temp src: Oral BP: 110/54 Pulse: 64 Heart Rate: 63 Resp: 16 SpO2: 97 % O2 Device: None (Room air)    Vitals:    04/20/20 0500 04/21/20 0700 04/22/20 0500   Weight: 101.2 kg (223 lb 1.7 oz) 101.3 kg (223 lb 5.2 oz) 101.4 kg (223 lb 8.7 oz)     Vital Signs with Ranges  Temp:  [97.6  F (36.4  C)-98.3  F (36.8  C)] 97.6  F (36.4  C)  Pulse:  [60-64] 64  Heart Rate:  [57-65] 63  Resp:  [16] 16  BP: ()/(50-64) 110/54  SpO2:  [93 %-100 %] 97 %  I/O last 3 completed shifts:  In: -   Out: 2225 [Urine:2225]    Constitutional: NAD, alert, upset which is patient's baseline, demonstrates " passive-aggressive tendencies.  Respiratory: Clear B/L; respiration nonlabored; '  Cardiovascular: RRR   GI: soft, nt, nd.  No rebound, guarding or other peritoneal signs.  Skin/Integumen: no rash. +4 edema right lower extremity to level of groin.  Wearing shinker sock on left BKA  Msk; left BKA  Neuro; .  Moves arms and legs, angry response to all points of discussion and questioning.     Medications     - MEDICATION INSTRUCTIONS -         bisacodyl  10 mg Oral Once     furosemide  40 mg Intravenous Daily     gabapentin  600 mg Oral BID     multivitamin w/minerals  1 tablet Oral Daily     pantoprazole  40 mg Oral BID AC     polyethylene glycol  238 g Oral Once     sodium chloride (PF)  10 mL Intracatheter Q8H     spironolactone  100 mg Oral Daily     sucralfate  1 g Oral 4x Daily AC & HS       Data   Recent Labs   Lab 04/23/20  1033 04/22/20  0556 04/21/20  1820  04/18/20  0610  04/17/20  0545  04/16/20  2130   WBC  --   --   --   --  7.6  --  5.5  --  6.9   HGB 7.4* 7.3* 7.8*   < > 7.5*   < > 6.9*   < > 7.2*   MCV  --   --   --   --  91  --  91  --  90   PLT  --   --   --   --  153  --  160  --  146*    137  --   --  141  --  143   < >  --    POTASSIUM 4.1 4.1  --   --  4.0  --  4.4   < >  --    CHLORIDE 104 107  --   --  109  --  112*   < >  --    CO2 28 26  --   --  26  --  27   < >  --    BUN 23 23  --   --  20  --  24   < >  --    CR 1.70* 1.62*  --   --  1.63*  --  1.50*   < >  --    ANIONGAP 4 4  --   --  6  --  4   < >  --    BENNIE 7.7* 7.7*  --   --  7.6*  --  7.8*   < >  --    * 87  --   --  74  --  118*   < >  --     < > = values in this interval not displayed.       Discussed with Nursing and Patient today.

## 2020-04-23 NOTE — PLAN OF CARE
VSS on RA. A&Ox 4, able to communicate needs. Pt denies pain. A2 w/ walker and gaitbelt. Refused to get out of bed this shift. 2g sodium diet, good appetite. Denied N&V. +gas, LBM 4/22. Voiding adequate with urinal. LS clear. Pt very frustrated and irritable during shift. Refused full skin assessment, hygiene cares, and turn and repositioning. Will continue to monitor.

## 2020-04-24 NOTE — PROGRESS NOTES
Chart Check Heme/Onc:    Notes reviewed. No new recommendations.     Continue off of anticoagulation.      If Sage does not have any evidence of GI bleeding and his hemoglobin remains stable for at least 1 week, then low dose DOAC (Apixaban 2.5 mg twice daily) could be attempted. If bleeding resumes while on anticoagulation, then anticoagulation will need to be stopped and permanently discontinued.      It is noted that DOAC therapy is less effective in known APLA, but warfarin would likely be unpredictable given issues with alcohol use. LMWH is also an option, likely in prophylactic dosing, but I am not sure that he would take these injections.     Patricio QUESADA, CNP  [facility]  [phone] (office) or [phone] (cell)

## 2020-04-24 NOTE — PROGRESS NOTES
"[facility]    Hospitalist Progress Note    Assessment & Plan   Antonio Ramirez is a 75 year old male admitted on 3/31/2020 with complicated medical hx as below on chronic anticoagulation who was admitted with acute on chronic anemia due to rectal bleeding.      Alcohol cirrhosis with persistent ascites, portal hypertensive gastropathy, and assumed coagulapathy.  Alcohol dependence.  Severe acute GI bleed- suspecting small bowel AVM  -Has standing appointment for paracentesis every 2 weeks (last 3/23) since 2016.  - Consume 3-4 vodka drinks per day.   - No desire to quit drinking at this point. INR 3.92 on adm and reversed with vitamin K.  --- had pill cam 4/3; Pill cam read 4/7. Pill did not leave his stomach the 12 hours it was on so no helpful information gained. Repeat pill cam several days later was negative. Discussed with [facility] on 4/16.   --had enteroscopy 4/8, showing some portal gastropathy and stomach, otherwise unremarkable, no bleeding  --- Tagged RBC scan was positive x 2  --Patient had PillCam placed endoscopically 4/9  --patient had a maroon colored stool 04/10; also per Dr. Boyd, \"Still having ongoing intermittent bleeding, etiology unclear. Could be small bowel or colon (only fair prep on colonoscopy) Also had some bloody diarrhea on 4/14 AM  -- repeating tagged rbc scan showed faint bleed in colon or small bowel, may repeat pill cam study again.  - had IR angiogram without any source of bleeding identified: : Negative angiogram for bleed. No active extravasation. No  suggestion of AVM or other obvious etiology for ongoing intermittent GI bleed.  -- Venofer infusion x 2.  -INR 1.4. has received 10 mg IV Vit K  -S/p EGD and enteroscopy by Dr. Nagy 4/16: + oozing AVMs stomach and jejunum. Sites were treated.  - given albumin iv x2 days, and started IV lasix daily, also on spironolactone.  Due to marked anasarca, I/O indicating minimal diuresis, gave extra furosemide 40 mg IV x 2 " on  4/21/2020 and 4/22/20 with good diuresis at 1.5 L and 2.0 L.  Continues diuresis 1L on  maintenance diuresis at furosemide 40 mg IV daily and spironolactone.  Close monitor I's/O's, BMP.  - colonoscopy done 4/17 showed actively bleeding venous malformation/Dieulafoy's lesion with active arterial bleeding the distal transverse colon.  Bleeding was stopped completely with argon plasma coagulation, Endo Clip placement and epinephrine injection;rectal varices with grade 2-3 hemorrhoids  - change hemoglobin checks to daily.  -advanced to 2 gram sodium by GI.  I appreciate Dr. Nagy's followup  - octreotide discontinued  -  transfuse for hemoglobin < 7 g/dl per GI  -4/20/2020 Hospitalist discussion with ; GI recommends that patient' remain off anticoagulants for at least 1 more week.  Patient has colonic AWMs and he is at risk for further GI bleeds in the future'.  If anticoagulants are restarted in the future and patient has another bleeding event, he recommends that they be discontinued permanently, in addition please see Hematology recommendations below.    Care Issues;  4/22/2020.    Patient has been upset, wants to go home.  Attempted to update patient with Nurse, Anuja present on his complex and serious condition[s] including GI bleed[s] in the context of chronic anticoagulation and risk of rebleeding, anasarca and volume overload in patient with known alcoholic cirrhosis and portal hypertension.  Advised if he was to go home, by his wishes AMA concerns of high risk of recurrent GI bleed, potential thrombosis/stroke given known coagulopathy, and/or complications of fluid overload.  Reviewed plan of GI and Hematology serially monitoring for evidence of recurrent GI bleed and recommendations on trial restart AC if after 1 week serial hemoglobins reflect stability.  In addition discussed with patient advancing IV diuretics given fluid overload situation, see above.  Patient continued to be recalcitrant,  "long discussion by telephone initially with Patient's wife however due to [place] limiting communication contacted patient's Daughter María updating on patient's condition[s], recommendations of specialist, GI, Hematology and efforts on advancing IV diuresis for fluid overload situation.  Per discussion with Daughter María it appears there has also been malinformation [either incomplete or untrue information] given by patient to his Wife and daughter regarding his condition, for example stating that he is ready to \"go home\"for the last few days or other times 'that he is dying'..  This has compounded establishing treatment plan for patient.  In addition discussed with Daughter that Nursing have had difficulty with the patient regarding disrespectful verbal behavior and discussed with Daughter that it is an expectation that Staff are treated in a respectful manner.  At conclusion daughter María stated that she would communicate with Patient regarding these issues and felt that patient would be compliant.  Discussed outcome of communication with daughter with SW and Care Manager.  Monitor.       Acute on chronic anemia and chronic thrombocytopenia.   -- Chronic component related to ongoing alcohol use, cirrhosis, and CKD and previous bleeding and underlying low grade lymphoproliferative disorder. .  - DOAC therapy has been discontinued this admission with acute bleeding  - No evidence of hemolysis with negative GISELL, haptoglobin, LDH, and blood smear  - Component of iron deficiency. Iron could be given IV to replace losses from GI bleeding. Given two doses of iv iron this stay  -- 7 to 10 day history of rectal bleeding. Hgb 5.4 on adm with 5 units of PRBC transfusion.  -- EGD and colonoscopy as above.  -- changed to po protonix  --transfused 1 unit of PRBC on 4/4, repeated unit 4/8, repeated on 4/15 and 4/16  --Cont hold ASA 81mg daily.  --IV ceftriaxone 2 gram daily for SBP prophylaxis. Now stopped.   --Heme/Onc " "following.  --A STEFANY studies including beta-2 glycoprotein, anticardiolipin antibodies WAS NEGATIVE and lupus inhibitor was positive.  -Per Dr. Raymond 4/20/2020 ' if after 1 week he shows no evidence of bleeding will consider low dose AC (eliquis?) although DOAC are less effictive than coumadin in APLA but I will be concerned about fluctuations of coumadin levels.'   -4/21/2020 per Hematology   \"If after 1 week patient shows no evidence of bleeding, stable hemoglobin, would/could consider low-dose AC, Eliquis 2.5 mg twice daily, please see his note 4/21,22,23,24/2020 ; also stating although DOAC are less effective than Coumadin in a POA concern regarding fluctuation in Coumadin'.      CAD s/p 3v-CABG.  Ischemic cardiomyopathy with HFrEF.  Severe pulmonary hypertension.  Valvular disorder. Mild to moderate mitral regurgitation, mild mitral stenosis, mild tricuspid regurgitation, and moderate aortic stenosis per echo 2018.  -EF46% Lexiscan 10/2019  -- holding asa  -diuresis, see above.       HTN,   -- see above, PTA no antihypertensives, currently on spironolactone.        Hx PEA arrest and VT arrest 6/2019.  Noncompliant in review of most recent cardiology note.  --Tele, I&Os, and daily weights.  --PTA ASA on hold as above.      Persistent atrial fibrillation.  Patient stated that he goes in and out of atrial fibrillation.  He states PTA was compliant with daily Xarelto, complications of bleeding, see above, other risk factors including alcohol history presentation with coagulopathy..  --discontinued Xarelto   --Hold aspirin        Antiphospholipid antibody syndrome with hx PE s/p IVC filter (2007).  --discontinued Xarelto as above.  --Hold aspirin as above.  --See discussion above, per HemOnc regarding GIB and restart DUOC       CKD, stage III. Baseline Cr 1.5, 1.6-1.8 on admission.   currently Cr 1.5  -Close monitor on diuresis.  -Avoid nephrotoxin.      Chronic neuropathy. Gabapentin.  tolerating      " Left BKA.      Hx prostate CA and large cell B-cell non-Hodgkin's lymphoma:  S/p radioactive seeding of the prostate.  Appears to be in remission.    --No interventions necessary at this point.        Severe malnutrition  Malnutrition: (4/7)   % Weight Loss:  > 7.5% in 3 months (severe malnutrition)   % Intake:  </= 50% for >/= 5 days (severe malnutrition) - on average   Subcutaneous Fat Loss:  Unable   Muscle Loss:  Unable   Fluid Retention:  Moderate       Malnutrition Diagnosis: Severe malnutrition      Diet: 2 gram Na diet,  Levin Catheter: not present  DVT Prophylaxis: Pneumatic Compression Devices  Code Status: Full Code confirmed with patient 4/2.     Expected discharge: complicated GIB/AC: potential Monday on serial hgb to assess stability and restart AC per GI and HemeOnc, see above;; continued diuresis.  Therapies recommend discharge to TCU, however it is likely that patient will refuse, see above.    Gildardo Nicholas MD  Text Page  (7am to 6pm)  Interval History   Continues with angry disposition however reports no pain, dyspnea, PND orthopnea.  Continued diuresis 1 L / 24 hours.  Denies abdominal pain, nausea, emesis, nursing reports no melena, hematochezia.  Nursing reports continued noncompliance to daily cares such as repositioning, ambulation.     -Data reviewed today: I reviewed all new labs and imaging results over the last 24 hours. I personally reviewed labs from last 24 hours    Physical Exam   Temp: 98  F (36.7  C) Temp src: Oral BP: 103/55 Pulse: 65 Heart Rate: 68 Resp: 16 SpO2: 96 % O2 Device: None (Room air)    Vitals:    04/21/20 0700 04/22/20 0500 04/24/20 0500   Weight: 101.3 kg (223 lb 5.2 oz) 101.4 kg (223 lb 8.7 oz) 101.3 kg (223 lb 5.2 oz)     Vital Signs with Ranges  Temp:  [98  F (36.7  C)-98.2  F (36.8  C)] 98  F (36.7  C)  Pulse:  [60-66] 65  Heart Rate:  [63-68] 68  Resp:  [16-18] 16  BP: ()/(47-59) 103/55  SpO2:  [94 %-98 %] 96 %  I/O last 3 completed shifts:  In: 480  [P.O.:480]  Out: 1475 [Urine:1475]    Constitutional: NAD, alert, continues to display angry tendencies   Respiratory: Clear B/L; respiration nonlabored; '  Cardiovascular: RRR   GI: soft, nt, nd.  No rebound, guarding or other peritoneal signs.  Skin/Integumen: no rash. +4 edema right lower extremity to level of groin.  Wearing shinker sock on left BKA  Msk; left BKA  Neuro; .  Moves arms and legs  Psych: Demonstrates angry/passive-aggressive response to all points of discussion and questioning.    Medications     - MEDICATION INSTRUCTIONS -         bisacodyl  10 mg Oral Once     furosemide  40 mg Intravenous Daily     gabapentin  600 mg Oral BID     multivitamin w/minerals  1 tablet Oral Daily     pantoprazole  40 mg Oral BID AC     polyethylene glycol  238 g Oral Once     sodium chloride (PF)  10 mL Intracatheter Q8H     spironolactone  100 mg Oral Daily     sucralfate  1 g Oral 4x Daily AC & HS       Data   Recent Labs   Lab 04/24/20  0842 04/23/20  1033 04/22/20  0556  04/18/20  0610   WBC  --   --   --   --  7.6   HGB 7.4* 7.4* 7.3*   < > 7.5*   MCV  --   --   --   --  91   PLT  --   --   --   --  153    136 137  --  141   POTASSIUM 4.2 4.1 4.1  --  4.0   CHLORIDE 105 104 107  --  109   CO2 26 28 26  --  26   BUN 22 23 23  --  20   CR 1.68* 1.70* 1.62*  --  1.63*   ANIONGAP 5 4 4  --  6   BENNIE 8.2* 7.7* 7.7*  --  7.6*   GLC 78 139* 87  --  74    < > = values in this interval not displayed.       Discussed with Nursing, Care Coordinator/SW and Patient today

## 2020-04-24 NOTE — PLAN OF CARE
"  Discharge Planner PT   Patient plan for discharge: Return to home  Current status: Greeted patient sitting up in recliner with prosthetic leg already donned. Engaged patient in sit <> stand transfer with FWW at minAx2. Engaged patient in ambulation with FWW at CGA for a total distance of 30ft - patient declines further ambulation, Per request, engaged patient in toilet transfer to BSC - patient requiring dependent pericares and brief management. Engaged patient in commode > bed transfers at minAx2. Engaged patient in sitting at EOB > supine at minAx2 with HOB elevated per request. Concluded session with patient supine in bed, all needs in reach and nursing notified of status.   Barriers to return to prior living situation: Needs assist with mobility & donning/doffing prosthesis, limited gait distance, high falls risk, multiple recent falls.  Recommendations for discharge: TCU  Rationale for recommendations: Pt would benefit from continued PT while admitted and at discharge to improve strength, balance, mobility to increase independence, reduce falls risk and increase safety before returning home. Pt stating that it is not the concern of PT if he is \"safe\" to return home and that he \"will be fine at home.\"       Entered by: Lashell Laureano 04/24/2020 3:52 PM       "

## 2020-04-24 NOTE — PLAN OF CARE
"Discharge Planner OT   Patient plan for discharge: Home   Current status: Pt went from sit<>stand from the EOB (with bed elevated) with minimum assist (SBA of rehab aid for safety). Pt refused bathroom transfers, despite encouragement and education, reporting \"the bathroom is too small.\"  Pt took 3 small steps to the BSC with walker and minimum assist of 1-2. Pt transferred to/from the bedside commode with minimum assist of 2.   Barriers to return to prior living situation: Current level of A for I/ADLs and functional transfers   Recommendations for discharge: TCU  Rationale for recommendations: Skilled OT in TCU setting to address safety and independence in I/ADLs, as pt is below baseline.        Entered by: Veena Sims 04/24/2020 2:02 PM      "

## 2020-04-24 NOTE — PLAN OF CARE
Lower extremity  edema to Right +3 weight stable unchanged.denies SOB clear dalia urine uses urinal had one incontinent stool brown loose and had one in Bathroom late afternoon., up in chair with OT flat affect. No rectal bleeding.  Hg 7.4.

## 2020-04-25 NOTE — PLAN OF CARE
A+O VSS ex soft BPs in 90s. LS clear. Bowels active, flatus+. Abdomen distended. Voiding adequately. Denies pain. 3-4+ edema in BLE. Up w/ 2 and walker. Refuses repositioning and coccyx skin checks.

## 2020-04-25 NOTE — PROGRESS NOTES
Chart Check Heme/Onc:    Notes reviewed. No new recommendations. Please see note from 4/24/2020.    El Nixon MD

## 2020-04-25 NOTE — PLAN OF CARE
Voiding 400cc amounts into urinal.  Some incontinence. Excoriated scrotum had good teto care and desenex powder applied awaiting  rehab to get up in chair. R leg edema unchanged VVS.

## 2020-04-25 NOTE — PROGRESS NOTES
"[facility]    Hospitalist Progress Note    Date of Service: 04/25/2020    Assessment & Plan   Antonio Ramirez is a 75 year old male admitted on 3/31/2020 with complicated medical hx as below on chronic anticoagulation who was admitted with acute on chronic anemia due to rectal bleeding.      Alcohol cirrhosis with persistent ascites, portal hypertensive gastropathy, and assumed coagulapathy.  Alcohol dependence.  Severe acute GI bleed- suspecting small bowel AVM  -Has standing appointment for paracentesis every 2 weeks (last 3/23) since 2016.  - Consume 3-4 vodka drinks per day.   - No desire to quit drinking at this point. INR 3.92 on adm and reversed with vitamin K.  --- had pill cam 4/3; Pill cam read 4/7. Pill did not leave his stomach the 12 hours it was on so no helpful information gained. Repeat pill cam several days later was negative. Discussed with [facility] on 4/16.   --had enteroscopy 4/8, showing some portal gastropathy and stomach, otherwise unremarkable, no bleeding  --- Tagged RBC scan was positive x 2  --Patient had PillCam placed endoscopically 4/9  --patient had a maroon colored stool 04/10; also per Dr. Boyd, \"Still having ongoing intermittent bleeding, etiology unclear. Could be small bowel or colon (only fair prep on colonoscopy) Also had some bloody diarrhea on 4/14 AM  -- repeating tagged rbc scan showed faint bleed in colon or small bowel, may repeat pill cam study again.  - had IR angiogram without any source of bleeding identified: : Negative angiogram for bleed. No active extravasation. No  suggestion of AVM or other obvious etiology for ongoing intermittent GI bleed.  -- Venofer infusion x 2.  -INR 1.4. has received 10 mg IV Vit K  -S/p EGD and enteroscopy by Dr. Nagy 4/16: + oozing AVMs stomach and jejunum. Sites were treated. Likely cause of bleeding/drop in Hb.   - given albumin iv x2 days, and started IV lasix daily, also on spironolactone.  Due to marked anasarca, " "I/O indicating minimal diuresis, gave extra furosemide 40 mg IV 4/21/2020.  Patient symptomatic with orthopnea.  -4/22/2020, good diuresis 1.5 L, patient appears stronger; given anasarca will repeat extra furosemide 40 mg IV today, close monitor I's/O, BMP.  - colonoscopy done 4/17 showed actively bleeding venous malformation/Dieulafoy's lesion with active arterial bleeding the distal transverse colon.  Bleeding was stopped completely with argon plasma coagulation, Endo Clip placement and epinephrine injection  -Other findings at colonoscopy included a large amount of old blood mixed with stool throughout the colon and rectal varices with grade 2-3 hemorrhoids  --per GI, \"Colon now very clean so should pass clear, pink tinged stool now. If having clearly bloody stools going forward this would indicate rebleed and NOT old blood.\"  - change hemoglobin checks to every 12 hours  -advanced to 2 gram sodium by GI.  I appreciate Dr. Nagy's followup  - octreotide discontinued  - continue to transfuse for hemoglobin < 7 g/dl  -4/20/2020 Hospitalist discussion with ; GI recommends that patient remain off anticoagulants for at least 1 more week.  Patient has colonic AWMs and he is at risk for further GI bleeds in the future.  If anticoagulants are restarted in the future and patient has another bleeding event, he recommends that they be discontinued permanently, please see Hematology recommendations below.  -04/24/2020Discussed with Dr. Nagy at ~1500.  He believes patient is at his baseline.  He is adding nadolol today and will continue diuretics.  He recommends that patient remain off anticoagulants for at least 1 more week.  Patient has colonic AWMs and he is at risk for further GI bleeds in the future.  If anticoagulants are restarted in the future and patient has another bleeding event, he recommends that they be discontinued permanently.     Acute on chronic anemia and chronic thrombocytopenia.   -- Chronic " "component related to ongoing alcohol use, cirrhosis, and CKD and previous bleeding and underlying low grade lymphoproliferative disorder. .  -Baseline hgb around 7.  - DOAC therapy has been discontinued this admission with acute bleeding  - No evidence of hemolysis with negative GISELL, haptoglobin, LDH, and blood smear  - Component of iron deficiency. Iron could be given IV to replace losses from GI bleeding. Given two doses of iv iron this stay  -- 7 to 10 day history of rectal bleeding. Hgb 5.4 on adm with 5 units of PRBC transfusion.  -- EGD did not show source of bleeding. Colonoscopy with red blood in terminal ileum throughout entire examined colon, one 10 mm polyp in the ascending colon (not removed), sigmoid diverticulosis, a non-bleeding rectal varix, and non-bleeding external hemorrhoids. Tagged RBC scan suggested bleed in duodenum which was felt inaccurate as EGD showed normal duodenum.  -- changed to po protonix  --transfused 1 unit of PRBC on 4/4, repeated unit 4/8, repeated on 4/15 and 4/16  --Cont hold ASA 81mg daily.  --IV ceftriaxone 2 gram daily for SBP prophylaxis. Now stopped.   --Heme/Onc consult requested, I discussed with Patricio Jones and appreciate his evaluation and recommendations   --A STEFANY studies including beta-2 glycoprotein, anticardiolipin antibodies WAS NEGATIVE and lupus inhibitor was positive.  -Per Dr. Raymond 4/20/2020 ' if after 1 week he shows no evidence of bleeding will consider low dose AC (eliquis?) although DOAC are less effictive than coumadin in APLA but I will be concerned about fluctuations of coumadin levels.'   -4/21/2020 per Hematology   \"If after 1 week patient shows no evidence of bleeding, stable hemoglobin, would consider low-dose AC, Eliquis 2.5 mg twice daily, please see his note stating although DOAC are less effective than Coumadin in a POA concern regarding fluctuation in Coumadin'.      CAD s/p 3v-CABG.  Ischemic cardiomyopathy with HFrEF.  Severe " "pulmonary hypertension.  Valvular disorder. Mild to moderate mitral regurgitation, mild mitral stenosis, mild tricuspid regurgitation, and moderate aortic stenosis per echo 2018.  -EF46% Lexiscan 10/2019  -- denies any chest pain or sob  Plan:  -- holding asa  -- diuresing now, stopped ivfs 4/16, transition to PO diuresis tomorrow.       HTN, DLD.  -- not on any bp medications  -- just diuretics for now      Hx PEA arrest and VT arrest 6/2019.  Noncompliant in review of most recent cardiology note.  --Tele, I&Os, and daily weights.  --PTA ASA and diuretics on hold as above.      Persistent atrial fibrillation.  Patient stated that he goes in and out of atrial fibrillation.  He was compliant with daily Xarelto, complications of bleeding, see above, other risk factors including alcohol history presentation with coagulopathy..  Rate controlled.     Plan:   --discontinued Xarelto   --Hold aspirin        Antiphospholipid antibody syndrome with hx PE s/p IVC filter (2007).  --discontinued Xarelto as above.  --Hold aspirin as above.  --See discussion above, I appreciate heme/onc recommendations  -per oncology, \"If source of bleeding can be identified and controlled, we can try to carefully resume low dose rivaroxaban. If bleeding site not discovered or controllable, then the risks of anticoagulation are likely too great and he will need to be off anticoagulation.  - continue off anticoagulation at present   -holding anticoagulation, will discuss timing of resumption with oncology and GI  We will continue to assess Hematology opinion regarding restart of anticoagulation, complex GI bleeding as above, other risk factors including alcohol history with presentation with coagulopathy..       CKD, stage III. Baseline Cr 1.5, 1.6-1.8 on admission.   currently Cr 1.5  -- creatinine stable at 1.3-1.5--> 1.5.   -- mucomyst yesterday and today to mitigate post contras wt nephropathy      Chronic " neuropathy. Gabapentin.  tolerating      Left BKA.      Hx prostate CA and large cell B-cell non-Hodgkin's lymphoma:  S/p radioactive seeding of the prostate.  Appears to be in remission.    --No interventions necessary at this point.        Severe malnutrition  Malnutrition: (4/7)   % Weight Loss:  > 7.5% in 3 months (severe malnutrition)   % Intake:  </= 50% for >/= 5 days (severe malnutrition) - on average   Subcutaneous Fat Loss:  Unable   Muscle Loss:  Unable   Fluid Retention:  Moderate       Malnutrition Diagnosis: Severe malnutrition      Diet: 2 gram Na diet,  Levin Catheter: not present  DVT Prophylaxis: Pneumatic Compression Devices  Code Status: Full Code confirmed with patient 4/2.     Expected discharge: tomorrow, PT now strongly advising TCUm but patient refusion    William Shaffer MD  Text Page  (7am to 6pm)  Interval History     No acute events overnight, today he denies any abdominal pain, no nausea, emesis, eating.  However patient does endorse edema in left leg up to groin.  Reports he is urinating on diuresis.  Hemoglobins have remained stable, no melena, hematochezia.     -Data reviewed today: I reviewed all new labs and imaging results over the last 24 hours. I personally reviewed labs from last 24 hours    Physical Exam   Temp: 97.6  F (36.4  C) Temp src: Oral BP: 98/50 Pulse: 64 Heart Rate: 64 Resp: 18 SpO2: 96 % O2 Device: None (Room air)    Vitals:    04/22/20 0500 04/24/20 0500 04/25/20 0600   Weight: 101.4 kg (223 lb 8.7 oz) 101.3 kg (223 lb 5.2 oz) 102.8 kg (226 lb 10.1 oz)     Vital Signs with Ranges  Temp:  [97.5  F (36.4  C)-98.2  F (36.8  C)] 97.6  F (36.4  C)  Pulse:  [63-64] 64  Heart Rate:  [61-68] 64  Resp:  [16-18] 18  BP: ()/(50-60) 98/50  SpO2:  [92 %-98 %] 96 %  I/O last 3 completed shifts:  In: 480 [P.O.:480]  Out: 1025 [Urine:1025]    Constitutional: NAD, alert; angry disposition.   Respiratory: Clear B/L; respiration nonlabored; sitting >45'  Cardiovascular: RRR   GI:  soft, nt, nd  Skin/Integumen: no rash. +4 edema right lower extremity to level of groin.  Wearing shinker sock on left BKA  Msk; left BKA  Neuro; .  Moves arms and legs, angry response to all points of discussion and questioning.     Medications     - MEDICATION INSTRUCTIONS -         bisacodyl  10 mg Oral Once     furosemide  40 mg Intravenous Daily     gabapentin  600 mg Oral BID     multivitamin w/minerals  1 tablet Oral Daily     pantoprazole  40 mg Oral BID AC     polyethylene glycol  238 g Oral Once     sodium chloride (PF)  10 mL Intracatheter Q8H     spironolactone  100 mg Oral Daily     sucralfate  1 g Oral 4x Daily AC & HS       Data   Recent Labs   Lab 04/25/20  0857 04/24/20  0842 04/23/20  1033   WBC 4.6  --   --    HGB 7.5* 7.4* 7.4*   MCV 89  --   --      --   --     136 136   POTASSIUM 4.1 4.2 4.1   CHLORIDE 104 105 104   CO2 25 26 28   BUN 21 22 23   CR 1.67* 1.68* 1.70*   ANIONGAP 5 5 4   BENNIE 8.2* 8.2* 7.7*   * 78 139*   ALBUMIN 2.3*  --   --    PROTTOTAL 5.5*  --   --    BILITOTAL 0.6  --   --    ALKPHOS 60  --   --    ALT 12  --   --    AST 25  --   --

## 2020-04-26 NOTE — PLAN OF CARE
DATE & TIME: 4/25/2020 8668-4801    Cognitive Concerns/ Orientation : A&O x4- very pleasant/cooperative  BEHAVIOR & AGGRESSION TOOL COLOR: Green   ABNL VS/O2: VSS on RA.   MOBILITY: Ax2 gb/walker   PAIN MANAGMENT: denies this shift.   DIET: 3 gram Na+.- refused dinner tonight.   BOWEL/BLADDER: continent- up to bedside commode/BM x1 during shift.   ABNL LAB/BG: creat 1.67, Hgb 7.5, RBC 2.67  DRAIN/DEVICES: Midline SL.   SKIN: 3-4+ BLE edema. L BKA w/ prosthesis. Abrasion on forehead. Excoriated scrotum. Rectal varices and external hemorrhoids.   D/C DAY/GOALS/PLACE: plan to discharge home tomorrow. PT recommends TCU but pt refusing.   OTHER IMPORTANT INFO: colorectal, Hem/onc, OT/PT following. Pt on scheduled gabapentin. Fall precautions maintained. Pt refusing repositioning in bed.

## 2020-04-26 NOTE — PLAN OF CARE
To be Discharged home to wife transferred with one assist and prosthesis to . Will transfer to care at door 2. Home med's reviewed with patient. Refused his lasix this morning and stated he would take his Carafate when he got home.

## 2020-04-26 NOTE — PLAN OF CARE
"PT: Attempted to see pt, RN going through discharge paperwork and meds. Pt denies any therapy need, no questions except \"a wheelchair to get me outta here.\"    Physical Therapy Discharge Summary    Reason for therapy discharge:    Discharged to home.    Progress towards therapy goal(s). See goals on Care Plan in [facility] electronic health record for goal details.  Goals not met.  Barriers to achieving goals:   limited tolerance for therapy and discharge from facility.    Therapy recommendation(s):    Continued therapy is recommended.  Rationale/Recommendations:  Pt would benefit from continued PT at discharge to improve strength, balance, mobility to increase independence, reduce falls risk and increase safety before returning home. Pt stating that it is not the concern of PT if he is \"safe\" to return home and that he \"will be fine at home.\". Requires assist with all mobility at time of discharge, including don/doff of prosthesis, transfers and very short gait distances. Pt is a high fall risk.           "

## 2020-04-26 NOTE — PLAN OF CARE
OT: Attempted OT. Pt refused and reports he is playing [email or web address] right now and will be going home before noon and does not want to do therapy before he goes. When writer asked about previous plan for TCU as he is needing assist of 2 for mobility at times, he says that he can get up and walk on his own if he wants to and the doctor told him he can go home today.

## 2020-04-26 NOTE — PLAN OF CARE
Occupational Therapy Discharge Summary    Reason for therapy discharge:    Discharged to home with home therapy.    Progress towards therapy goal(s). See goals on Care Plan in [facility] electronic health record for goal details.  Goals not met.  Barriers to achieving goals:   limited tolerance for therapy and discharge from facility.    Therapy recommendation(s):    Continued therapy is recommended.  Rationale/Recommendations:  TCU was strongly recommended but pt declined. Home care was ordered as pt is not able to leave the home without assist of another person and AD; required Ax 2 for transfers..

## 2020-04-26 NOTE — PLAN OF CARE
A+O VSS on room air. LS clear. Bowels active, flatus+. Abdomen distended. Voiding adequately. 3-4+ edema in BLE. Denies pain and SOB. Refuses repositioning. Excoriated scrotum.

## 2020-04-26 NOTE — PROGRESS NOTES
Notified of patient discharge. Patient has met with multiple staff in care transitions. Reviewed notes and confirmed with patient his desire to go home with [facility] services with referral to [facility]. Patient declines TCU (recommendation) as is consistent w/ several notes.     Referral sent to [facility] via standard process and follow up with his primary care provided is scheduled for tomorrow, Monday, April 27th. Patient updated on all the above and satisfied with this plan.     Bedside RN updated regarding discharge plan.

## 2020-04-26 NOTE — DISCHARGE SUMMARY
[facility]  Hospitalist Discharge Summary      Date of Admission:  3/31/2020  Date of Discharge:  4/26/2020  Discharging Provider: William Shaffer MD    Discharge Diagnoses         Alcohol cirrhosis with persistent ascites, portal hypertensive gastropathy, and assumed coagulapathy.  Alcohol dependence.  Severe acute GI bleed -> suspecting small bowel AVM    Follow-ups Needed After Discharge   Follow-up Appointments     Follow-up and recommended labs and tests       Apr 13, 2020 10:50 AM CDT   Telephone Visit with Juli Smith PA-C   [facility] ([facility])  [address]   [facility] [id number] [phone] OPT 2   [facility]  Note: this is not an onsite visit; there is no need to come to the   facility.  Please have a list of all current medications available for appointment.       Apr 20, 2020 11:00 AM CDT   Telephone Visit with Main Hardy MD   [facility]  [address] [facility] [id number] [phone]   [facility]  Note: this is not an onsite visit; there is no need to come to the   facility.  Please have a list of all current medications available for appointment.           Follow-up and recommended labs and tests       Follow up with primary care provider, Main Hardy, within 7 days for   hospital follow- up.  The following labs/tests are recommended: CBC/BMP.             Unresulted Labs Ordered in the Past 30 Days of this Admission     No orders found from 3/1/2020 to 4/1/2020.        Discharge Disposition   Admited to home care:   Agency: [facility]  Discharged to home  Condition at discharge: Stable    Hospital Course   Antonio Ramirez is a 75 year old male admitted on 3/31/2020 with complicated medical hx as below on chronic anticoagulation who was admitted with acute on chronic anemia due to rectal  "bleeding.      Alcohol cirrhosis with persistent ascites, portal hypertensive gastropathy, and assumed coagulapathy.  Alcohol dependence.  Severe acute GI bleed- suspecting small bowel AVM  -Has standing appointment for paracentesis every 2 weeks (last 3/23) since 2016.  - Consume 3-4 vodka drinks per day.   - No desire to quit drinking at this point. INR 3.92 on adm and reversed with vitamin K.  --- had pill cam 4/3; Pill cam read 4/7. Pill did not leave his stomach the 12 hours it was on so no helpful information gained. Repeat pill cam several days later was negative. Discussed with [facility] on 4/16.   --had enteroscopy 4/8, showing some portal gastropathy and stomach, otherwise unremarkable, no bleeding  --- Tagged RBC scan was positive x 2  --Patient had PillCam placed endoscopically 4/9  --patient had a maroon colored stool 04/10; also per Dr. Boyd, \"Still having ongoing intermittent bleeding, etiology unclear. Could be small bowel or colon (only fair prep on colonoscopy) Also had some bloody diarrhea on 4/14 AM  -- repeating tagged rbc scan showed faint bleed in colon or small bowel, may repeat pill cam study again.  - had IR angiogram without any source of bleeding identified: : Negative angiogram for bleed. No active extravasation. No  suggestion of AVM or other obvious etiology for ongoing intermittent GI bleed.  -- Venofer infusion x 2.  -INR 1.4. has received 10 mg IV Vit K  -S/p EGD and enteroscopy by Dr. Nagy 4/16: + oozing AVMs stomach and jejunum. Sites were treated. Likely cause of bleeding/drop in Hb.   - given albumin iv x2 days, and started IV lasix daily, also on spironolactone.  Due to marked anasarca, I/O indicating minimal diuresis, gave extra furosemide 40 mg IV 4/21/2020.  Patient symptomatic with orthopnea.  -4/22/2020, good diuresis 1.5 L, patient appears stronger; given anasarca will repeat extra furosemide 40 mg IV today, close monitor I's/O, BMP.  - colonoscopy done 4/17 showed " "actively bleeding venous malformation/Dieulafoy's lesion with active arterial bleeding the distal transverse colon.  Bleeding was stopped completely with argon plasma coagulation, Endo Clip placement and epinephrine injection  -Other findings at colonoscopy included a large amount of old blood mixed with stool throughout the colon and rectal varices with grade 2-3 hemorrhoids  --per GI, \"Colon now very clean so should pass clear, pink tinged stool now. If having clearly bloody stools going forward this would indicate rebleed and NOT old blood.\"  - change hemoglobin checks to every 12 hours  -advanced to 2 gram sodium by GI.  I appreciate Dr. Nagy's followup  - octreotide discontinued  - continue to transfuse for hemoglobin < 7 g/dl  -4/20/2020 Hospitalist discussion with ; GI recommends that patient remain off anticoagulants for at least 1 more week.  Patient has colonic AWMs and he is at risk for further GI bleeds in the future.  If anticoagulants are restarted in the future and patient has another bleeding event, he recommends that they be discontinued permanently, please see Hematology recommendations below.  -04/24/2020Discussed with Dr. Nagy.  He believes patient is at his baseline.  He is adding nadolol today and will continue diuretics.  He recommends that patient remain off anticoagulants for at least 1 more week.  Patient has colonic AWMs and he is at risk for further GI bleeds in the future.  If anticoagulants are restarted in the future and patient has another bleeding event, he recommends that they be discontinued permanently.     Acute on chronic anemia and chronic thrombocytopenia.   -- Chronic component related to ongoing alcohol use, cirrhosis, and CKD and previous bleeding and underlying low grade lymphoproliferative disorder. .  -Baseline hgb around 7.  - DOAC therapy has been discontinued this admission with acute bleeding  - No evidence of hemolysis with negative GISELL, haptoglobin, " "LDH, and blood smear  - Component of iron deficiency. Iron could be given IV to replace losses from GI bleeding. Given two doses of iv iron this stay  -- 7 to 10 day history of rectal bleeding. Hgb 5.4 on adm with 5 units of PRBC transfusion.  -- EGD did not show source of bleeding. Colonoscopy with red blood in terminal ileum throughout entire examined colon, one 10 mm polyp in the ascending colon (not removed), sigmoid diverticulosis, a non-bleeding rectal varix, and non-bleeding external hemorrhoids. Tagged RBC scan suggested bleed in duodenum which was felt inaccurate as EGD showed normal duodenum.  -- changed to po protonix  --transfused 1 unit of PRBC on 4/4, repeated unit 4/8, repeated on 4/15 and 4/16  --Cont hold ASA 81mg daily.  --IV ceftriaxone 2 gram daily for SBP prophylaxis. Now stopped.   --Heme/Onc consult requested, I discussed with Patricio Jones and appreciate his evaluation and recommendations   --A STEFANY studies including beta-2 glycoprotein, anticardiolipin antibodies WAS NEGATIVE and lupus inhibitor was positive.  -Per Dr. Raymond 4/20/2020 ' if after 1 week he shows no evidence of bleeding will consider low dose AC (eliquis?) although DOAC are less effictive than coumadin in APLA but I will be concerned about fluctuations of coumadin levels.'   -4/21/2020 per Hematology   \"If after 1 week patient shows no evidence of bleeding, stable hemoglobin, would consider low-dose AC, Eliquis 2.5 mg twice daily, please see his note stating although DOAC are less effective than Coumadin in a POA concern regarding fluctuation in Coumadin'.      CAD s/p 3v-CABG.  Ischemic cardiomyopathy with HFrEF.  Severe pulmonary hypertension.  Valvular disorder. Mild to moderate mitral regurgitation, mild mitral stenosis, mild tricuspid regurgitation, and moderate aortic stenosis per echo 2018.  -EF46% Lexiscan 10/2019  Plan:  -- holding asa  -- diuresing now, stopped ivfs 4/16, transition to PO diuresis " torsemide 40 mg in AM and 20 mg in PM at discharge. Follow up BMP at discharge.      HTN, DLD.  -- not on any bp medications  -- just diuretics for now as above  With spironolactone 100 mg daily      Hx PEA arrest and VT arrest 6/2019.  Noncompliant in review of most recent cardiology note.  --Tele, I&Os, and daily weights.  --PTA ASA on hold as above.      Persistent atrial fibrillation.  Patient stated that he goes in and out of atrial fibrillation.  He was compliant with daily Xarelto, complications of bleeding, see above, other risk factors including alcohol history presentation with coagulopathy..  Rate controlled.   Plan:   --discontinued Xarelto   --Hold aspirin        Antiphospholipid antibody syndrome with hx PE s/p IVC filter (2007).  --discontinued Xarelto as above.  --Hold aspirin as above.  - Opted to hold off restart of anticoagulation, complex GI bleeding as above, other risk factors including alcohol history with presentation with coagulopathy.    CKD, stage III. Baseline Cr 1.5, 1.6-1.8 on admission.   currently Cr 1.5  -- creatinine stable at 1.3-1.5--> 1.5. -> 1.6  -- mucomyst yesterday and today to mitigate post contras wt nephropathy      Chronic neuropathy. Gabapentin.  tolerating      Left BKA.      Hx prostate CA and large cell B-cell non-Hodgkin's lymphoma:  S/p radioactive seeding of the prostate.  Appears to be in remission.    --No interventions necessary at this point.        Severe malnutrition  Malnutrition: (4/7)   % Weight Loss:  > 7.5% in 3 months (severe malnutrition)   % Intake:  </= 50% for >/= 5 days (severe malnutrition) - on average   Subcutaneous Fat Loss:  Unable   Muscle Loss:  Unable   Fluid Retention:  Moderate       Malnutrition Diagnosis: Severe malnutrition        Consultations This Hospital Stay   GASTROENTEROLOGY IP CONSULT  PHYSICAL THERAPY ADULT IP CONSULT  CARE TRANSITION RN/SW IP CONSULT  CARE COORDINATOR IP CONSULT  HEMATOLOGY & ONCOLOGY IP CONSULT  NEPHROLOGY  IP CONSULT  VASCULAR ACCESS ADULT IP CONSULT  GASTROENTEROLOGY IP CONSULT  PHYSICAL THERAPY ADULT IP CONSULT  OCCUPATIONAL THERAPY ADULT IP CONSULT  SOCIAL WORK IP CONSULT  WOUND OSTOMY CONTINENCE NURSE  IP CONSULT    Code Status   Full Code    Time Spent on this Encounter   I, William Shaffer MD, personally saw the patient today and spent greater than 30 minutes discharging this patient.       William Shaffer MD  [facility]  ______________________________________________________________________    Physical Exam   Vital Signs: Temp: 98.2  F (36.8  C) Temp src: Oral BP: 104/60 Pulse: 58 Heart Rate: 63 Resp: 18 SpO2: 94 % O2 Device: None (Room air)    Weight: 225 lbs 15.54 oz    Primary Care Physician   Main Hardy    Discharge Orders      Home care nursing referral      Home Care PT Referral for Hospital Discharge      Home Care OT Referral for Hospital Discharge      Follow-up and recommended labs and tests     Apr 13, 2020 10:50 AM CDT   Telephone Visit with Juli Smith PA-C   [facility] ([facility])  [address] [address] [phone] OPT 2   [facility]  Note: this is not an onsite visit; there is no need to come to the facility.  Please have a list of all current medications available for appointment.       Apr 20, 2020 11:00 AM CDT   Telephone Visit with Main Hardy MD   [facility]  [address] [place] [id number] [phone]   [facility]  Note: this is not an onsite visit; there is no need to come to the facility.  Please have a list of all current medications available for appointment.     Reason for your hospital stay    You had loss of blood from gastrointestional bleeding.     Follow-up and recommended labs and tests     Follow up with primary care provider, Main Hardy, within 7 days for hospital  follow- up.  The following labs/tests are recommended: CBC/BMP.     Activity    Your activity upon discharge: activity as tolerated     MD face to face encounter    Documentation of Face to Face and Certification for Home Health Services    I certify that patient: Antonio Ramirez is under my care and that I, or a nurse practitioner or physician's assistant working with me, had a face-to-face encounter that meets the physician face-to-face encounter requirements with this patient on: 4/26/2020.    This encounter with the patient was in whole, or in part, for the following medical condition, which is the primary reason for home health care: cirrhosis, physical deconditoning.    I certify that, based on my findings, the following services are medically necessary home health services: Nursing, Occupational Therapy and Physical Therapy.    My clinical findings support the need for the above services because: Nurse is needed: For complex aftercare of surgical procedures because the patient needs instruction and cannot perform care on their own due to: physical deconditioning.., Occupational Therapy Services are needed to assess and treat cognitive ability and address ADL safety due to impairment in physical deconditioning. and Physical Therapy Services are needed to assess and treat the following functional impairments: physical deconditioning.    Further, I certify that my clinical findings support that this patient is homebound (i.e. absences from home require considerable and taxing effort and are for medical reasons or Baptism services or infrequently or of short duration when for other reasons) because: Requires assistance of another person or specialized equipment to access medical services because patient: Has prohibitive pain during ambulation...    Based on the above findings. I certify that this patient is confined to the home and needs intermittent skilled nursing care, physical therapy and/or speech therapy.   The patient is under my care, and I have initiated the establishment of the plan of care.  This patient will be followed by a physician who will periodically review the plan of care.  Physician/Provider to provide follow up care: Main Hardy    Attending hospital physician (the Medicare certified [place] provider): William Shaffer MD  Physician Signature: See electronic signature associated with these discharge orders.  Date: 4/26/2020     Diet    Follow this diet upon discharge: Orders Placed This Encounter      Snacks/Supplements Adult: Other; 4oz PLUS2 with chocolate syrup at meals; With Meals      3 Gram Sodium Diet       Significant Results and Procedures   Most Recent 3 CBC's:  Recent Labs   Lab Test 04/25/20  0857 04/24/20  0842 04/23/20  1033  04/18/20  0610  04/17/20  0545   WBC 4.6  --   --   --  7.6  --  5.5   HGB 7.5* 7.4* 7.4*   < > 7.5*   < > 6.9*   MCV 89  --   --   --  91  --  91     --   --   --  153  --  160    < > = values in this interval not displayed.     Most Recent 3 BMP's:  Recent Labs   Lab Test 04/26/20  0832 04/25/20  0857 04/24/20  0842    134 136   POTASSIUM 4.5 4.1 4.2   CHLORIDE 106 104 105   CO2 25 25 26   BUN 23 21 22   CR 1.64* 1.67* 1.68*   ANIONGAP 5 5 5   BENNIE 8.1* 8.2* 8.2*   GLC 83 153* 78     Most Recent 2 LFT's:  Recent Labs   Lab Test 04/25/20  0857 04/16/20  0613   AST 25 25   ALT 12 10   ALKPHOS 60 51   BILITOTAL 0.6 0.6     Most Recent 3 INR's:  Recent Labs   Lab Test 04/16/20  0613 04/14/20  0816 04/09/20  0612   INR 1.48* 1.37* 1.40*     Most Recent Urinalysis:  Recent Labs   Lab Test 06/11/19  0220   COLOR Yellow   APPEARANCE Clear   URINEGLC Negative   URINEBILI Negative   URINEKETONE Negative   SG 1.015   UBLD Moderate*   URINEPH 5.5   PROTEIN 10*   NITRITE Negative   LEUKEST Small*   RBCU 157*   WBCU 9*   ,   Results for orders placed or performed during the hospital encounter of 03/31/20   NM GI Bleed    Narrative    NUCLEAR MEDICINE GASTROINTESTINAL  BLEEDING STUDY 4/1/2020 3:58 PM    HISTORY: Gastrointestinal bleed. Cirrhotic patient, appears to have  small bowel source of acute bleed on EGD/ileocolonoscopy today.  Question AVM versus ectopic varix.    COMPARISON: None.    TECHNIQUE: Tc labeled red blood cells were injected intravenously with  subsequent dynamic imaging of the abdomen for evaluation of GI bleed.     DOSE: 27mCi Tc99m Ultratag RBC's See study notes. injected  intravenously.    FINDINGS: There is abnormal localization of tracer in the upper  abdomen to the right of midline, possibly within the duodenal C-loop.      Impression    IMPRESSION: Positive tagged red blood cell study with probable  localization to the duodenum. Interventional Radiology is aware of  these findings.    LESTER BROWN MD   US Paracentesis    Narrative    US PARACENTESIS 4/6/2020 2:25 PM     HISTORY: HIGH VOLUME paracentesis with or without diagnostic fluid  analysis with labs to be drawn if ordered. Total paracentesis volume  as much as possible.    FINDINGS: Limited preprocedure ultrasound was performed, images show a  sufficient amount of ascites for paracentesis. An image was archived.  Written and oral informed consent was obtained. A pause for the cause  procedure to verify the correct patient and correct procedure. The  skin overlying the right lower quadrant was prepped and draped in the  usual sterile fashion. The subcutaneous tissues were anesthetized with  10 mL 1% lidocaine. Under direct ultrasound guidance the catheter was  advanced into the peritoneal space and 3.1 L of  straw colored fluid  was drained. The catheter was removed and a sterile dressing was  applied. There were no immediate complications. Ultrasound images were  permanently stored.  Patient left the ultrasound suite in satisfactory  condition.      Impression    IMPRESSION: Technically successful paracentesis without immediate  complications.    PRETTY JO,    XR Abdomen 1 View     Narrative    EXAM: ABDOMEN 2 VIEWS  LOCATION: [facility]  DATE/TIME: 4/9/2020 6:51 AM    INDICATION: PillCam placed on April 3. Evaluate for retained PillCam.  COMPARISON: None.      Impression    IMPRESSION:   1. A 1.7 x 1.2 cm radiopaque foreign body projects over the central aspect of the right iliac bone. This is consistent with a retained PillCam that could either be within the distal ileum or cecum.  2. No evidence of bowel obstruction.   3. A few small radiodensities project over the expected location of the left kidney, possibly representing renal calculi.  4. Small right pleural effusion.              NM GI Bleed    Narrative    NM GASTROINTESTINAL BLEED   4/13/2020 1:12 PM     HISTORY: GI bleeding.    COMPARISON: GI bleeding study performed 4/1/2020.    TECHNIQUE: The patient's red blood cells were labeled with 25 mCi Tc  99m ultra tagged red blood cells. Dynamic images were obtained out  through 60 minutes.    FINDINGS: A small area of radiotracer activity appears projected over  the left midabdomen at approximately 19 minutes, with several  additional areas of radiotracer activity appearing shortly thereafter  over the right midabdomen. Findings are consistent with active GI  bleeding. The exact location of this GI bleeding is not clear on this  exam, and could be related to small bowel or colonic bleeding. These  findings have a similar appearance to the previous exam. Physiologic  radiotracer activity is again noted involving the heart, spleen,  penis, and arterial structures.      Impression    IMPRESSION: Findings are suspicious for active GI bleeding, although  the exact location of the bleeding within the bowel is not clear on  this exam.    I discussed these findings with Dr. Serrano from interventional  radiology 1:26 PM on 4/13/2020. I also discussed these findings with  the patient's nurse Gemini at 1:32 PM on 4/13/2020.    KACI BAUMANN MD   IR Visceral Angiogram    Narrative     INTERVENTIONAL RADIOLOGY VISCERAL ANGIOGRAM 4/14/2020 12:39 PM    HISTORY: 75-year-old patient with history of GI bleed. Patient has  been in the hospital for nearly 2 weeks with intermittent and  consistent GI bleed. Patient has required periodic blood transfusions,  though a bleed has never been clinically brisk. Patient had initial  tagged red blood cell scan on April 1, which was not clearly positive.  Patient had a tagged red blood cell scan on April 13 which is faintly  positive. In review of this exam, rate of bleeding suggested would not  be apparent on angiogram, though no other options exist as patient has  had multiple previous endoscopies and pill endoscopy.    TECHNIQUE: Patient was brought to the interventional radiology  department and informed consent obtained.  Patient was placed in a supine position. Skin overlying the right  groin was prepped and draped in standard sterile fashion. Given plan  for closure device, ultrasound was used to visualize the right common  femoral artery and image stored for documentation after patency  confirmed. 1% lidocaine utilized. With continuous ultrasound guidance,  a micropuncture kit was used to access the right common femoral  artery. Over a series of maneuvers, a 6 Yoruba vascular sheath was  placed. Over a guidewire, a pigtail catheter was advanced to the  abdominal aorta where angiogram performed. A SOS catheter was then  used to access the celiac artery with tip in the celiac artery and  angiogram performed. Incidentally, the patient has altered anatomy  with celiac axis comprising left gastric artery and splenic artery.  SOS catheter was then placed into the superior mesenteric artery where  angiogram performed. Incidentally, patient has a replaced common  hepatic artery. Multiple projections were performed from the superior  mesenteric artery. The SOS catheter was then advanced into the  inferior mesenteric artery where angiogram performed. Again,  multiple  projections performed without obvious source. A C2 catheter was then  advanced into the superior mesenteric artery where angiogram  performed, initially in a tertiary branch with blood supply to the  jejunum. Catheter was then redirected in the main trunk of the  superior mesenteric artery, though secondary branch is beyond the  common hepatic artery. No active bleed identified at any point during  the exam. No obvious AVM or other abnormality. The C2 catheter was  removed. Angiogram was performed through the sheath in the right  groin. Patient has anatomy suitable for closure device and Angio-Seal  device was used at the arteriotomy site.    Sedation: A moderate level sedation was achieved with 25 mg IV  fentanyl.  Sedation time: 40 minutes  Please note the above medication was administered by the  interventional radiology staff under my direct supervision. Patient's  vital signs were monitored and remained stable throughout the  procedure.    Fluoroscopic time: 8.3 minutes  Air Kerma: 866.5 mGy  Contrast: 130 mL of Isovue administered intra-arterially without  complications.  Local anesthetic: 7 mL 1% lidocaine.    FINDINGS: A total of 8 spot fluoroscopic images were obtained as  described above. Both renal arteries are patent. Patient has slightly  atypical anatomy in that the common hepatic artery is entirely  replaced, arising from the superior mesenteric artery. Therefore the  celiac axis is comprised of left gastric and splenic arteries.  Angiograms performed from the celiac axis, superior mesenteric artery,  inferior mesenteric artery all appear unremarkable. No active  extravasation identified. No obvious AVM or other source of bleed  identified.      Impression    IMPRESSION: Negative angiogram for bleed. No active extravasation. No  suggestion of AVM or other obvious etiology for ongoing intermittent  GI bleed.    NICK LEBLANC MD   XR Chest 2 Views    Narrative    XR CHEST TWO VIEWS    4/15/2020 5:45 PM     HISTORY: Cough.    COMPARISON: 3/11/2020 chest x-ray.      Impression    IMPRESSION: Moderate right pleural effusion slightly larger than on  the comparison study with associated atelectasis. Left lung appears  clear. Previous sternotomy with mild cardiomegaly. Pulmonary  vasculature does not appear distended.    JERMAIN SINGH MD       Discharge Medications   Current Discharge Medication List      START taking these medications    Details   pantoprazole (PROTONIX) 40 MG EC tablet Take 1 tablet (40 mg) by mouth 2 times daily (before meals)  Qty: 60 tablet, Refills: 0    Associated Diagnoses: Alcoholic cirrhosis of liver with ascites (H)      sucralfate (CARAFATE) 1 GM/10ML suspension Take 10 mLs (1 g) by mouth 4 times daily (before meals and nightly)  Qty: 280 mL, Refills: 0    Associated Diagnoses: Alcoholic cirrhosis of liver with ascites (H)      !! torsemide (DEMADEX) 20 MG tablet Take 1 tablet (20 mg) by mouth every evening  Qty: 30 tablet, Refills: 0    Associated Diagnoses: Alcoholic cirrhosis of liver with ascites (H)       !! - Potential duplicate medications found. Please discuss with provider.      CONTINUE these medications which have CHANGED    Details   spironolactone (ALDACTONE) 100 MG tablet Take 1 tablet (100 mg) by mouth daily  Qty: 30 tablet, Refills: 0    Associated Diagnoses: Alcoholic cirrhosis of liver with ascites (H)         CONTINUE these medications which have NOT CHANGED    Details   albuterol (PROAIR HFA/PROVENTIL HFA/VENTOLIN HFA) 108 (90 Base) MCG/ACT inhaler Inhale 2 puffs into the lungs every 6 hours as needed     Comments: Pharmacy may dispense brand covered by insurance (Proair, or proventil or ventolin or generic albuterol inhaler)      gabapentin (NEURONTIN) 600 MG tablet Take 600 mg by mouth 2 times daily      !! torsemide (DEMADEX) 20 MG tablet Take 2 tablets (40 mg) by mouth every morning  Qty: 180 tablet, Refills: 3    Associated Diagnoses: Chronic  "systolic congestive heart failure (H)       !! - Potential duplicate medications found. Please discuss with provider.      STOP taking these medications       aspirin 81 MG EC tablet Comments:   Reason for Stopping:         rivaroxaban ANTICOAGULANT (XARELTO) 15 MG TABS tablet Comments:   Reason for Stopping:         rivaroxaban ANTICOAGULANT (XARELTO) 20 MG TABS tablet Comments:   Reason for Stopping:             Allergies   Allergies   Allergen Reactions     Blood Transfusion Related (Informational Only) Other (See Comments)     Patient has a history of a clinically significant antibody against RBC antigens.  A delay in compatible RBCs may occur.     Kdc:Minocycline+[place] [facility] GI Disturbance     11/01/16-PT IS NOT AWARE OF THIS REACTION     Ciprofloxacin Itching     Severe itching \"like ants were crawling\"     Hmg-Coa-R Inhibitors Other (See Comments)     Rhabdomyolysis occurred within a couple days  Rhabdomyolysis     "

## 2020-04-27 NOTE — PROGRESS NOTES
RADIOLOGY PROCEDURE NOTE  Patient name: Antonio Ramirez  MRN: [id number]  : 1945    Pre-procedure diagnosis: Ascites  Post-procedure diagnosis: Same    Procedure Date/Time: 2020  2:50 PM  Procedure: Paracentesis  Estimated blood loss: None  Specimen(s) collected with description: blood tinged cloudy fluid  The patient tolerated the procedure well with no immediate complications.  Significant findings:none    See imaging dictation for procedural details.    Provider name: Ran Giraldo PA-C  Assistant(s):None

## 2020-04-27 NOTE — PROGRESS NOTES
Care Suites Procedure Nursing Note    Patient Information  Name: Antonio Ramirez  Age: 75 year old    Procedure  Procedure: paracentesis to right side of abdomen, VSS, denies pain, 3900 ml of cloudy dalia fluid removed, medical glue applied to site and bandage, CDI  Procedure start time: 1418  Procedure complete time:   Concerns/abnormal assessment: no  If abnormal assessment, provider notified: N/A  Plan/Other: pt returned to room via cart by RN after procedure    Emmy Gaviria RN

## 2020-04-27 NOTE — PROGRESS NOTES
"Antonio Ramirez is a 75 year old male who is being evaluated via a billable telephone visit.      The patient has been notified of following:     \"This telephone visit will be conducted via a call between you and your physician/provider. We have found that certain health care needs can be provided without the need for a physical exam.  This service lets us provide the care you need with a short phone conversation.  If a prescription is necessary we can send it directly to your pharmacy.  If lab work is needed we can place an order for that and you can then stop by our lab to have the test done at a later time.    Telephone visits are billed at different rates depending on your insurance coverage. During this emergency period, for some insurers they may be billed the same as an in-person visit.  Please reach out to your insurance provider with any questions.    If during the course of the call the physician/provider feels a telephone visit is not appropriate, you will not be charged for this service.\"    Patient has given verbal consent for Telephone visit?  Yes    How would you like to obtain your AVS? Kumar Jones     Antonio Ramirez is a 75 year old male who presents to clinic today for the following health issues:    NORAH Cazares is a 75-year-old man with cirrhosis with ascites due to alcohol, thrombophilia, history of DVT/PE, atrial fibrillation, congestive heart failure, coronary artery disease, history of recent cardiac arrest, status post below the knee amputation, aortic stenosis, chronic anemia, history of subdural hematoma, hypertension, chronic kidney disease, prostate cancer, lymphoma.  Prior to his recent admission, he was drinking several vodka drinks per day.  This despite presenting on a nearly every other week basis for therapeutic paracenteses.  When questioned about whether he has started drinking again following his prolonged hospitalization, he was somewhat evasive about answering my " question.  He was hospitalized for several weeks for gastrointestinal hemorrhage.  He was found to have several culprit lesions that were addressed endoscopically.  The most recent gastroenterologist that attended to him recommended 1 week off of anticoagulants and a rechallenge of anticoagulants following that.  He was evaluated by hematology during his hospital stay and a consideration of low-dose Eliquis was presented as a potential middle ground rather than restarting the Xarelto that he had been on previously.  This was not started at the time of discharge.  The patient is feeling well since discharge and states that he felt well during his entire hospitalization and seemed a little bit annoyed that he was in the hospital for as long as he was.  He denies any blood in his stool since hospital discharge.  He has actually felt quite well since discharge and has no new complaints other than intermittent cramping in his feet and hands that occurs at night the symptoms of which have been present since prior to his hospitalization and are characterized as moderate in severity.  He does not have any current cramping.    Reviewed and updated as needed this visit by Provider         Review of Systems   ROS COMP: Constitutional, HEENT, cardiovascular, pulmonary, gi and gu systems are negative, except as otherwise noted.           Assessment/Plan:  1. Gastrointestinal hemorrhage, unspecified gastrointestinal hemorrhage type  2. Alcoholic cirrhosis of liver with ascites (H)  3. Thrombophilia (H)    I will coordinate with Dr. Mcintyre regarding the timing and choice and dose of anticoagulant re challenge.  I counseled Sage about alcohol again and he asked me not to 'lecture' him.  I think he understands that this is critical for his well being.    He had therapeutic paracentesis today. He is back on diuretics for his fluid management which could contribute to his risk for intermittent cramps.  I told him he could try 2  tablespoons of pickle juice as needed for cramps and call me back if that does not help enough.   He should schedule a face to face visit with me post COVID     Return in about 3 months (around 7/27/2020) for post COVID check .   Patient instructed to return to clinic or contact us sooner if symptoms worsen or new symptoms develop.     Phone call duration:  21 minutes    Main Hardy MD

## 2020-04-27 NOTE — PROGRESS NOTES
Clinic Care Coordination Contact  Care Coordination Communication    Referral Source: IP Report    Clinical Data:  [facility]  Hospitalist Discharge Summary       Date of Admission:  3/31/2020  Date of Discharge:  4/26/2020  Discharging Provider: William Shaffer MD        Discharge Diagnoses            Alcohol cirrhosis with persistent ascites, portal hypertensive gastropathy, and assumed coagulapathy.  Alcohol dependence.  Severe acute GI bleed -> suspecting small bowel AVM         Home Care Contact:              Home Care Agency: [facility] RN/PT/OT              Contact name () and phone number: Marlena Guerrero [facility] RN              Care Coordination contacted home care: Yes              Anticipated start of care date: Tomorrow 4/28/2020    Patient Contact:               Introduced self and role of care coordination.               Discharge instructions were reviewed with patient/caregiver.               Do you have any questions about your medications? No              Follow up appointment is scheduled for Telephone visit today at 3:30.              Provided 24 Hour Nurse Line and/or 24 Hour Appointment Scheduling: Yes              Home care has contacted patient: Yes              Patient questions/concerns: Patient very brief with his answers ...Patient is at bit off balance and is using a walker in the home for safety  Denies any falls in the past.  Patient denies any blood in the stool.  Patient states his energy level is OK  Patient has  [facility] visit scheduled tomorrow.  Patient has a telephone visit with the PCP today   Patient does not have any questions about his discharge instructions  No unmet needs and refuses a CC follow up call.  CC informed patient CC will leave contact information with his home care RN for questions or concerns     Plan: RN/SW Care Coordinator will await notification from home care staff informing RN/SW Care Coordinator of patients discharge plans/needs.  RN/SW Care Coordinator will review chart and outreach to home care every 4 weeks and as needed.        [facility]     Xiao Huston RN Care Coordinator   [facility] / [facility]   Phone: [phone]  Email :  [email or web address]

## 2020-04-27 NOTE — LETTER
[facility] CARE COORDINATION  [address]  [place] [address]    April 27, 2020    Antonio Ramirez  [address]   [place] [address]      Dear Antonio,    I am a clinic care coordinator who works with Main Hardy MD at [facility]. I wanted to thank you for spending the time to talk with me.  Below is a description of clinic care coordination and how I can further assist you.      The clinic care coordination team is made up of a registered nurse,  and community health worker who understand the health care system. The goal of clinic care coordination is to help you manage your health and improve access to the health care system in the most efficient manner. The team can assist you in meeting your health care goals by providing education, coordinating services, strengthening the communication among your providers and supporting you with any resource needs.    Please feel free to contact me at [phone] with any questions or concerns. We are focused on providing you with the highest-quality healthcare experience possible and that all starts with you.     Sincerely,     [facility]     Xiao Huston RN Care Coordinator   [facility]   Phone: [phone]  Email :  [email or web address]      Enclosed: I have enclosed a copy of a 24 Hour Access Plan. This has helpful phone numbers for you to call when needed. Please keep this in an easy to access place to use as needed.

## 2020-04-27 NOTE — PROGRESS NOTES
Care Suites Admission Nursing Note    Patient Information  Name: Antonio Ramirez  Age: 75 year old  Reason for admission: paracentesis  Care Suites arrival time: 1300    Patient Admission/Assessment   Pre-procedure assessment complete: Yes  If abnormal assessment/labs, provider notified: N/A  NPO: N/A  Medications held per instructions/orders: N/A  Consent: deferred  If applicable, pregnancy test status: deferred  Patient oriented to room: Yes  Education/questions answered: Yes, discharge instructions given, questions answered and pt sdtates understanding  Plan/other:     Discharge Planning  Accompanied by:self  Discharge name/phone number: see arrival  Overnight post sedation caregiver: wife  Discharge location: home    Emmy Gaviria RN

## 2020-04-27 NOTE — PROGRESS NOTES
Care Suites Discharge Nursing Note    Patient Information  Name: Antonio Ramirez  Age: 75 year old    Discharge Education:  Discharge instructions reviewed: Yes  Additional education/resources provided:   Patient/patient representative verbalizes understanding: Yes  Patient discharging on new medications: No  Medication education completed: N/A    Discharge Plans:   Discharge location: home  Discharge ride contacted: Yes  Approximate discharge time:1520    Discharge Criteria:  Discharge criteria met and vital signs stable: Yes, VSS site CDI with bandage    Patient Belongs:  Patient belongings returned to patient: Yes    Emmy Gaviria RN

## 2020-04-27 NOTE — PROGRESS NOTES
PATIENT WELLNESS SCREENING    Step 1: Answer all screening questions 1-3.    1. In the last month, have you been in contact with someone who was confirmed or suspected to have Coronavirus/COVID-19? No    2. Do you have the following symptoms?   Fever? No    Cough? No   Shortness of breath? No   Skin rash? No    3. Have you traveled internationally in the last month? No   If so, where?     Step 2: Refer to logic grid below for actions    NO SYMPTOM(S)    ACTIONS:  1. Standard rooming process  2. Provider to assess per normal protocol    POSITIVE SYMPTOM(S)  If positive for ANY of the following symptoms: fever, cough, shortness of breath, rash    ACTIONS  1. Mask patient  2. Room patient as soon as possible  3. Don appropriate PPE when entering room  4. Provider evaluation

## 2020-04-27 NOTE — LETTER
Health Care Home - Access Care Plan    About Me:    Patient Name:  Antonio Ramirez    YOB: 1945  Age:                      75 year old   [place] MRN:     [id number] Telephone Information:   Home Phone [phone]   Mobile [phone]       Address:  [address]  [place] [address] Email address:  [email or web address]      Emergency Contact(s)   Name Relationship Lgl Grd Work Phone Home Phone Mobile Phone   1. MATT RAMIREZ Spouse No   [phone]   2. DIPIKA RAMIREZ  Daughter No   [phone]             Health Maintenance: Routine Health maintenance Reviewed: Not assessed    My Access Plan  Medical Emergency 911   Questions or concerns during clinic hours Primary Clinic Line, I will call the clinic directly: [facility] - [phone]   24 Hour Appointment Line [phone] or  [phone]) (toll free)   24 Hour Nurse Line [phone] (toll free)   Questions or concerns outside clinic hours 24 Hour Appointment Line, I will call the after-hours on-call line:   [facility] [phone] or [phone]) (toll-free)   Preferred Urgent Care [facility], [phone]   Preferred Hospital [facility]  [phone]   Preferred Pharmacy [id number] [place], [place] - [address] AT [address] [place] [address]     Behavioral Health Crisis Line The National Suicide Prevention Lifeline at 1-416.603.8600 or 914                     My Care Team Members  Patient Care Team       Relationship Specialty Notifications Start End    Main Hardy MD PCP - General Internal Medicine  5/24/17     Phone: [phone] Pager: [phone] Fax: [phone] [address] [place] [address]    Latasha Flower Home Care Nurse   2/1/18     FV RN Case Manager    Care, [facility]  HOME HEALTH AGENCY ([facility]), ([place])  12/31/18     Phone: [phone]         Lilliana Cueva, APRN CNP Assigned PCP    4/26/20     Phone: [phone] Fax: [phone] [address] [place] [address]           My Medical and Care Information  Problem List   Patient Active Problem List   Diagnosis     Alcohol abuse     Alcoholic cirrhosis of liver (H)     Chronic kidney disease, stage III (moderate) (H)     Physical deconditioning     Prostate cancer -- S/P [employer] implants 2000 (no problems since)     Antiphospholipid antibody syndrome (H)     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 (H)     Thrombocytopenia -- suspect related to alcohol use     Benign essential hypertension     Malabsorption of iron     Calculi, ureter     Anemia     CAD, S/P CABG in 2007     Aortic stenosis     Nonrheumatic mitral valve regurgitation     Nonrheumatic tricuspid valve regurgitation     Pulmonary hypertension (H)     Paroxysmal atrial fibrillation (H)     Ischemic cardiomyopathy     Venous thrombosis     S/P total hip arthroplasty     S/P Left BKA 2017     Acute cystitis without hematuria     Traumatic hematoma of buttock     Acute blood loss anemia     CRF (chronic renal failure), stage 3 (moderate) (H)     MSSA bacteremia     H/O fall, recent     Hematoma of left lower extremity, subsequent encounter     Supratherapeutic INR     H/O deep venous thrombosis     History of pulmonary embolism     Essential hypertension     Chronic systolic congestive heart failure (H)     CKD (chronic kidney disease) stage 3, GFR 30-59 ml/min (H)     Phantom limb pain (H)     Debility     Osteomyelitis (H)     Abnormal liver function test     Altered mental status     Amputation stump complication (H)     Ascites due to alcoholic cirrhosis (H)     Balance problem     Calculus of kidney     Coagulopathy (H)     Hepatic cirrhosis (H)     Hx MRSA infection     Long term (current) use of anticoagulants     Lymphoproliferative disorder, low grade B cell (H)     Monoclonal gammopathy     Open wound of foot except toes with tendon involvement      Overweight (BMI 25.0-29.9)     Neuropathy     Peripheral neuropathy, idiopathic     Personal history of malignant neoplasm of prostate     Phlebitis and thrombophlebitis of superficial vessels of lower extremities     PVD (peripheral vascular disease) (H)     Renal dysfunction     S/P CABG (coronary artery bypass graft)     Statin intolerance     Therapeutic drug monitoring     Thoracic aortic aneurysm (TAA) (H)     Vitamin D deficiency     Bursitis of left knee     Pulmonary embolism (H)     Arthritis of knee, degenerative     Ulcer of left lower extremity, limited to breakdown of skin (H)     Lymphedema of both lower extremities     Rectal bleeding      Current Medications and Allergies:  See printed Medication Report

## 2020-04-27 NOTE — DISCHARGE INSTRUCTIONS
Paracentesis & Thoracentesis Discharge Instructions     After you go home:      You may resume your normal diet.    Care of Puncture Sites:      For the first 48 hrs, check your puncture sites every couple hours while you are awake     If there is a bandaid - you may remove it tomorrow morning    You may shower tomorrow    No tub baths, whirlpools or swimming until your puncture sites have fully healed    Fluid may leak from the abdominal site. Change the bandaid as needed - keep the site dry    If the fluid leaks for more than 48 hours, call your ordering provider     Activity:      You may go back to normal activity in 24 hours     Wait 48 hours before lifting, straining, exercise or other strenuous activity    Medicines:      You may resume all your medications    For minor pain, you may take Acetaminophen (Tylenol) or Ibuprofen (Advil)            Call the provider who ordered this procedure if:      The site is red, swollen, hot or tender    Blood or fluid is draining from the site    Chills or a fever greater than 101 F (38 C)    Shortness of breath    Pain that is getting worse    Leaking from the site that does not stop    Any questions or concerns      Additional Information:      During a thoracentesis, a needle will sometimes enter the lung. This can cause the lung to collapse - called a pneumothorax. Symptoms include severe chest pain, breathing problems or increased blood in your sputum (phlegm).     Call  911 or go to the Emergency Room if:      Severe chest pain or trouble breathing    Increased blood in your sputum (phlegm)    Bleeding that you cannot control      If you have questions call:        [facility] Radiology Dept @ [phone]      The provider who performed your procedure was _________________.

## 2020-04-28 NOTE — TELEPHONE ENCOUNTER
[facility] now requests orders and shares plan of care/discharge summaries for some patients through [employer].  Please REPLY TO THIS MESSAGE OR ROUTE BACK TO THE AUTHOR in order to give authorization for orders when needed.  This is considered a verbal order, you will still receive a faxed copy of orders for signature.  Thank you for your assistance in improving collaboration for our patients.    ORDER  Duplicate therapy aldactone and torsemide. Ok for patient to continue these two?  Duplicate therapy carafate and protonix. Ok for patient to continue these medications?      Patient only agreeable to one more SN visit as he feels he's doing just fine and doesn't need a nurse.    SN 1 wk 1, 3 as needed for GI bleed assess/education, home safety, medication assessment/education, BLE edema assess/education, assess respiratory/LS and educate    PT to evaluate and treat ambulation, endurance, gait, balance, fall assessment, energy conservation.    MD SUMMARY/PLAN OF CARE  Patient was hospitalized 3/31/20-4/26/20 for GI bleed/anemia. Patient had a EGD and enteroscopy by Dr. Nagy 4/16. He was positive for oozing AVMs stomach and jejunum. Sites were treated. Colonoscopy was done 4/17 and showed actively bleeding venous malformation/Dieulafoy's lesion with active arterial bleeding the distal transverse colon.  Bleeding was stopped completely with argon plasma coagulation, Endo Clip placement and epinephrine injection. GI MD recommends pt stop anticoagulation for now. MD hematology, PCP, and GI to work together. Baseline, pt hgb is around 7. On 4/25/20, hgb was 7.5. Patient presents today on his recliner where he sleeps at times. His VSS and patient denies SOB but is SOB on exertion. LS diminished and patient has a dry nonproductive cough. Patient denies pain, but does have baseline neuropathy of extremities, making it difficult to do things such as write. To ambulate, patient has his left BKA prosthetic and  "walker. He required assistance to transfer off couch as left leg prosthetic slides and did not have a sock or shoe on it. SN educated patient on this and safety to prevent falls as patient has fallen at least 5x in the last year due to mechanical fall. To shower, patient has shower chair and grab bars. He denies needing assistance for this. Patient's wife has been assisting with medications and giving to patient at appropriate time. BLE edema 3+ and willis/flaky skin. Patient has lymphedema socks but not currently wearing. He states he knows what to do and is declining OT lymphedema. Patient has a scab on his forehead due to \"picking\". His right foot little toe also has dried blood, per pt it bleeds sometimes. SN educated on cares to prevention infections/injury. Bandage over paracentesis right abdominal site. Patient continues with paracentesis biweekly. Patient also continues to drink daily with no desire to quit and eats only 1 meal a day in evening. Patient is on a 3gm sodium diet but is unsure how he would even monitor sodium intake. Patient declines dietician and says he has a dietician in family.    BACKGROUND: Left BKA, afib, ETOH abuse, Acute on chronic anemia and chronic thrombocytopenia. Venous thrombosis, phlebitis and thrombophlebitis, Neuropathy. Alcoholic cirrhosis of liver, phantom limb pain, vitamin d deficiency, rectal bleed, htn, cabg, CAD, aortic stenosis, hepatic cirrhosis, malabsorption of iron, nonrheumatic mitral and tricuspid valve regurgitation, pulmonary embolism, ostemyelitis, peripheral vascular disease, thoracic aortic aneurysm, bursitis left knee, amputation stump complication, lymphedema BLE, antiphospholipid antibody syndrome, CKD3, prostate cancer, ureter calculi, monoclonal gammopathy, Lymphoproliferative disorder, low grade B cell  ANALYSIS: patient at risk for falls and hospitalizations related to comorbidities and GI bleeds in past. Patient is deconditioned as he says in hospital " they didn't ambulate him often. Patient declined TCU at end of hospitalization because he felt they would just have him lay in bed like he did in hospital.  RECOMMENDATION    Patient only agreeable to one more SN visit as he feels he's doing just fine and doesn't need a nurse.  SN 1 wk 1, 3 as needed for GI bleed assess/education, home safety, medication assessment/education, BLE edema assess/education, assess respiratory/LS and educate  .  PT to evaluate and treat ambulation, endurance, gait, balance, fall assessment, energy conservation.    Patient also declines OT and OT lymphedema evaluations. Patient declines needing HHA for bathing and personal cares.      Thank you.    Marlena Guerrero RN, BSN, PHN  [facility]  [phone]  [email or web address]

## 2020-04-29 NOTE — TELEPHONE ENCOUNTER
Duplicate therapy aldactone and torsemide. Ok for patient to continue these two?  Duplicate therapy carafate and protonix. Ok for patient to continue these medications?

## 2020-05-01 NOTE — TELEPHONE ENCOUNTER
Reason for Call:  Home Health Care    Eneida with [facility] called regarding (reason for call):     Orders are needed for this patient.     PT: contitnue 2x4 weeks    Phone Number Homecare Nurse can be reached at: [phone]    Can we leave a detailed message on this number? YES    Best Time: any    Call taken on 5/1/2020 at 3:38 PM by Lilliana Stovall

## 2020-05-01 NOTE — TELEPHONE ENCOUNTER
Called Lucrecia with [facility] to provide verbal orders for PT as requested    Ruy GIBSON RN

## 2020-05-01 NOTE — TELEPHONE ENCOUNTER
Reason for Call:  Medication or medication refill:    Do you use a [facility]?  Name of the pharmacy and phone number for the current request:   [facility]  fax: [phone]    Name of the medication requested: Bariatric two wheeled walker    Other request:     Can we leave a detailed message on this number? YES    Phone number patient can be reached at: [phone]    Best Time: any    Call taken on 5/1/2020 at 3:40 PM by Lilliana Stovall

## 2020-05-07 NOTE — LETTER
May 8, 2020      Antonio KINGS Ramirez  [address]   [place] [address]        Dear ,    We are writing to inform you of your test results.    Good news! The hemoglobin is improving which indicates that the bleeding in the intestinal tract has likely stopped.  I think we should start the blood thinner Eliquis.  I discussed this with Dr. Mcintyre.  To be on the safe side, why don't we check your hemoglobin again after you have been taking the Eliquis for 7-10 days.  You can schedule a lab appointment for that purpose.  If the hemoglobin drops again, then Dr. Nagy recommends against anticoagulants (blood thinners).  I will communicate that result to you when it is available.  I sent an prescription for Eliquis (Apixaban) to your pharmacy.  Take one 2.5mg tablet ONCE daily.       Resulted Orders   **Hemoglobin FUTURE anytime   Result Value Ref Range    Hemoglobin 8.5 (L) 13.3 - 17.7 g/dL       If you have any questions or concerns, please call the clinic at the number listed above.       Sincerely,        Dr. Main Hardy MD/BENNY, MA

## 2020-05-08 NOTE — RESULT ENCOUNTER NOTE
The following letter pertains to your most recent diagnostic tests:    Good news! The hemoglobin is improving which indicates that the bleeding in the intestinal tract has likely stopped.  I think we should start the blood thinner Eliquis.  I discussed this with Dr. Mcintyre.  To be on the safe side, why don't we check your hemoglobin again after you have been taking the Eliquis for 7-10 days.  You can schedule a lab appointment for that purpose.  If the hemoglobin drops again, then Dr. Nagy recommends against anticoagulants (blood thinners).  I will communicate that result to you when it is available.  I sent an prescription for Eliquis (Apixaban) to your pharmacy.  Take one 2.5mg tablet ONCE daily.         Sincerely,    Dr. Hardy

## 2020-05-08 NOTE — RESULT ENCOUNTER NOTE
Please call Sage and make sure he understands my result letter and starts Eliquis and schedules a follow up hemoglobin after 7-10 days

## 2020-05-18 NOTE — DISCHARGE INSTRUCTIONS

## 2020-05-18 NOTE — PROGRESS NOTES
Care Suites Admission Nursing Note    Reason for admission: Paracentesis  CS arrival time: 1240    Accompanied by: self  Name/phone of DC : Helena - wife ... [phone]    Medications held: Eliquis  Education/questions answered: Procedure explained. All questions & concerns addressed.    Plan: home with wife post procedure    A/O. Denies pain. Procedure explained. All questions & concerns addressed.  Pt resting on cart, denies additional needs at this time, call light within reach.     [id number] Report given to Rekha Ayon RN.

## 2020-05-18 NOTE — PROGRESS NOTES
Care Suites Procedure Nursing Note    Patient Information  Name: Antonio Ramirez  Age: 75 year old    Procedure  Procedure: pracentesis  Procedure start time: 1424  Procedure complete time: 1440  Concerns/abnormal assessment: none  If abnormal assessment, provider notified: N/A  Plan/Other: observe until discharge  Tolerated right paracentesis well. 900 ml cloudy dalia fluid removed per Dr. Gamboa.    Elis Torres RN

## 2020-05-22 NOTE — TELEPHONE ENCOUNTER
I don't remember ordering covid testing for him  Can an rn call and see why he is coming in  If he has covid symptoms he should not come to our clinic  If he does not have covid symptoms then he can have antibody testing in our clinic

## 2020-05-22 NOTE — PROGRESS NOTES
Patient has up coming appointment for lab.   Lab is wondering if it was a COVID ANTIBODY test, since its for lab. If not an ANTIBODY test and only a COVID test swab. Please contact patient to see other location for this test. [facility]  Lab does not do swab tests, only antibody test.

## 2020-05-26 NOTE — PROGRESS NOTES
I reached him and he is coming for hgb follow up after eliquis.  He has NOT requested Covid testing. Routing to PCP and lab to remove covid order    Angela Epperson RN- Triage [employer]    HPI      ROS      Physical Exam

## 2020-05-28 NOTE — RESULT ENCOUNTER NOTE
The following letter pertains to your most recent diagnostic tests:  Good news! The hemoglobin is improving.  This is while you are taking the blood thinner.  This means that current ongoing bleeding is unlikely.  You may continue taking the Eliquis as you are.       Sincerely,    Dr. Hardy

## 2020-05-28 NOTE — LETTER
May 28, 2020      Antonio KU Ashley  [address]   [place] [address]        Dear ,    We are writing to inform you of your test results.    The following letter pertains to your most recent diagnostic tests:   Good news! The hemoglobin is improving.  This is while you are taking the blood thinner.  This means that current ongoing bleeding is unlikely.  You may continue taking the Eliquis as you are.     Resulted Orders   **Hemoglobin FUTURE anytime   Result Value Ref Range    Hemoglobin 9.6 (L) 13.3 - 17.7 g/dL       If you have any questions or concerns, please call the clinic at the number listed above.       Sincerely,      Main Hardy MD /shailesh

## 2020-05-29 NOTE — TELEPHONE ENCOUNTER
Pre-Procedure Unconfirmed COVID Test     COVID Screening  Due to the inability to confirm the patient's COVID status (positive or negative), the patient was screened for COVID symptoms     Patient reports the following:  Fever? No   Cough? No   Shortness of breath? No   Skin rash? No    If the patient is positive for new symptoms or worsening symptoms, and the procedure is deemed necessary by the ordering provider, notify your manager/supervisor     Patient Information  Patient informed of the no visitor policy  Patient instructed to continue to self-quarantine prior to procedure  Patient informed to contact the ordering provider if the following symptoms develop prior to procedure:   Fever  Cough  Shortness of Breath  Sore throat   Runny or stuffy nose  Muscle or body aches  Headaches  Fatigue  Vomiting or diarrhea   Rash    Angus Velasco RN

## 2020-05-29 NOTE — TELEPHONE ENCOUNTER
To be clear, Sage CAN have a paracentesis up to every 14 days, but it to be on an as needed basis, as such, if he does not feel intolerable distention, he certainly does NOT need to have a paracentesis, they are not mandatory procedures, they are available to him to manage symptoms.      Also if he feels dehydrated he can hold his diuretics over the weekend and restart next week.      Perhaps he should schedule a follow up tele/video visit next week to touch base about the management of his ascites and fluid balance.

## 2020-05-29 NOTE — TELEPHONE ENCOUNTER
Pt would like to cancel Paracentesis scheduled for Monday-     He will hold diuretic through the weekend (knows OK to resume if he feels he needs it)     Call if more questions or concerns    Scheduled f/u visit with PCP on Thursday, June 4th    Birdie PETTIT RN

## 2020-05-29 NOTE — TELEPHONE ENCOUNTER
Reason for Call:  Home Health Care    Eneida with [facility] called regarding (reason for call):     Orders are needed for this patient.     PT: 2x2 2x1    OT:     Skilled Nursing:   Requesting a nurse to call pt on dosing of diarrhetics and discuss paracentesis     Pt Provider: Antony    Phone Number Homecare Nurse can be reached at: [phone]    Can we leave a detailed message on this number? YES    Phone number patient can be reached at: Home number on file [phone] (home)    Best Time: any    Call taken on 5/29/2020 at 1:18 PM by Peyman Root

## 2020-05-29 NOTE — TELEPHONE ENCOUNTER
Returned call to Eneida with [facility]    PT: 2x2 2x1    Per home care nurse, patient is scheduled for a paracentesis on Monday 6/1/2020 however, patient feels he is low on fluid and is wondering if he should go to the paracentesis or if he can cancel it.  Per home care, patient stated he did not even produce 1 L of fluid the last time.    Patient is also wondering if he can decrease his diuretic since he feels low on fluid.     Please call patient with response by provider regarding the paracentesis and diuretic-not home care.    Routing to provider for review and advise as appropriate.    ROSY MurrellN, RN  [employer] Triage

## 2020-06-04 NOTE — PROGRESS NOTES
"Antonio Ramirez is a 75 year old male who is being evaluated via a billable video visit.      The patient has been notified of following:     \"This video visit will be conducted via a call between you and your physician/provider. We have found that certain health care needs can be provided without the need for an in-person physical exam.  This service lets us provide the care you need with a video conversation.  If a prescription is necessary we can send it directly to your pharmacy.  If lab work is needed we can place an order for that and you can then stop by our lab to have the test done at a later time.    Video visits are billed at different rates depending on your insurance coverage.  Please reach out to your insurance provider with any questions.    If during the course of the call the physician/provider feels a video visit is not appropriate, you will not be charged for this service.\"    Patient has given verbal consent for Video visit? Yes    How would you like to obtain your AVS? ShahidManito    Patient would like the video invitation sent by: Text to cell phone: [phone]    Will anyone else be joining your video visit? No    Subjective     Antonio Ramirez is a 75 year old male who presents today via video visit for the following health issues:    Hospitals in [place]         Video Start Time: 11;43    He could not get the video link to work  He had questions about diuretics, paracentesis, neuropathy symptoms in his hands, his hemoglobin, his INR, his use of Eliquis  He actually feels a lot better and seems to be improving daily     Patient Active Problem List   Diagnosis     Alcohol abuse     Alcoholic cirrhosis of liver (H)     Chronic kidney disease, stage III (moderate) (H)     Physical deconditioning     Prostate cancer -- S/P Radiation Seed implants 2000 (no problems since)     Antiphospholipid antibody syndrome (H)     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 (H)     Thrombocytopenia -- suspect related to " alcohol use     Benign essential hypertension     Malabsorption of iron     Calculi, ureter     Anemia     CAD, S/P CABG in 2007     Aortic stenosis     Nonrheumatic mitral valve regurgitation     Nonrheumatic tricuspid valve regurgitation     Pulmonary hypertension (H)     Paroxysmal atrial fibrillation (H)     Ischemic cardiomyopathy     Venous thrombosis     S/P total hip arthroplasty     S/P Left BKA 2017     Acute cystitis without hematuria     Traumatic hematoma of buttock     Acute blood loss anemia     CRF (chronic renal failure), stage 3 (moderate) (H)     MSSA bacteremia     H/O fall, recent     Hematoma of left lower extremity, subsequent encounter     Supratherapeutic INR     H/O deep venous thrombosis     History of pulmonary embolism     Essential hypertension     Chronic systolic congestive heart failure (H)     CKD (chronic kidney disease) stage 3, GFR 30-59 ml/min (H)     Phantom limb pain (H)     Debility     Osteomyelitis (H)     Abnormal liver function test     Altered mental status     Amputation stump complication (H)     Ascites due to alcoholic cirrhosis (H)     Balance problem     Calculus of kidney     Coagulopathy (H)     Hepatic cirrhosis (H)     Hx MRSA infection     Long term (current) use of anticoagulants     Lymphoproliferative disorder, low grade B cell (H)     Monoclonal gammopathy     Open wound of foot except toes with tendon involvement     Overweight (BMI 25.0-29.9)     Neuropathy     Peripheral neuropathy, idiopathic     Personal history of malignant neoplasm of prostate     Phlebitis and thrombophlebitis of superficial vessels of lower extremities     PVD (peripheral vascular disease) (H)     Renal dysfunction     S/P CABG (coronary artery bypass graft)     Statin intolerance     Therapeutic drug monitoring     Thoracic aortic aneurysm (TAA) (H)     Vitamin D deficiency     Bursitis of left knee     Pulmonary embolism (H)     Arthritis of knee, degenerative     Ulcer of left  lower extremity, limited to breakdown of skin (H)     Lymphedema of both lower extremities     Rectal bleeding     Past Surgical History:   Procedure Laterality Date     AMPUTATE LEG BELOW KNEE Left 04/2014    related to gangrene, started as foot ulcer related to neuropathy     AMPUTATE LEG BELOW KNEE Left 12/4/2017    Procedure: AMPUTATE LEG BELOW KNEE;  Exploration of Left Leg Below Knee Amputation Stump with Ligation of Bleeders.;  Surgeon: Leandro Arreola MD;  Location:  OR     APPENDECTOMY       APPLY WOUND VAC Left 12/6/2017    Procedure: APPLY WOUND VAC;;  Surgeon: Saima Howard MD;  Location:  SD     ARTHROPLASTY HIP Right 11/27/2018    Procedure: RIGHT TOTAL HIP ARTHROPLASTY;  Surgeon: Saima Howard MD;  Location:  OR     ARTHROPLASTY KNEE Right 9/19/2014    Procedure: ARTHROPLASTY KNEE;  Surgeon: Saima Howard MD;  Location:  OR     CARDIAC SURGERY      CABG     CHOLECYSTECTOMY       COLONOSCOPY       COLONOSCOPY N/A 4/1/2020    Procedure: COLONOSCOPY;  Surgeon: Emeka Freeman DO;  Location:  GI     COMBINED CYSTOSCOPY, RETROGRADES, EXCHANGE STENT URETER(S) Left 7/24/2018    Procedure: COMBINED CYSTOSCOPY, RETROGRADES, EXCHANGE STENT URETER(S);  CYSTOSCOPY, BILATERAL RETROGRADES, BILATERAL URETERAL STENT EXCHANGE ;  Surgeon: Matt Cervantes MD;  Location:  OR     CRANIOTOMY Right 4/7/2018    Procedure: CRANIOTOMY;  Right Craniotomy For Subdural Hematoma;  Surgeon: Jono Issa MD;  Location:  OR     CYSTOSCOPY, DILATE URETHRA, COMBINED N/A 10/12/2016    Procedure: COMBINED CYSTOSCOPY, DILATE URETHRA;  Surgeon: Dimitry Bello MD;  Location:  OR     CYSTOSCOPY, REMOVE STENT(S), COMBINED Right 7/24/2018    Procedure: COMBINED CYSTOSCOPY, REMOVE STENT(S);;  Surgeon: Jose Hunter MD;  Location:  OR     ENDOSCOPIC STRIPPING VEIN(S)       ENTEROSCOPY SMALL BOWEL N/A 4/8/2020    Procedure: ENTEROSCOPY;  Surgeon: Katherin Boyd,  MD;  Location:  OR     esophagogastroduodenoscopy       ESOPHAGOSCOPY, GASTROSCOPY, DUODENOSCOPY (EGD), COMBINED N/A 3/11/2019    Procedure: COMBINED ESOPHAGOSCOPY, GASTROSCOPY, DUODENOSCOPY (EGD), BIOPSY SINGLE OR MULTIPLE;  Surgeon: Katherin Boyd MD;  Location:  GI     ESOPHAGOSCOPY, GASTROSCOPY, DUODENOSCOPY (EGD), COMBINED N/A 9/27/2019    Procedure: ESOPHAGOGASTRODUODENOSCOPY, WITH FOREIGN BODY REMOVAL;  Surgeon: Raegan Mckeon MD;  Location:  GI     ESOPHAGOSCOPY, GASTROSCOPY, DUODENOSCOPY (EGD), COMBINED N/A 4/1/2020    Procedure: ESOPHAGOGASTRODUODENOSCOPY (EGD);  Surgeon: Emeka Freeman DO;  Location:  GI     ESOPHAGOSCOPY, GASTROSCOPY, DUODENOSCOPY (EGD), COMBINED N/A 4/9/2020    Procedure: Esophagoscopy, gastroscopy, duodenoscopy (EGD), combined;  Surgeon: Katherin Boyd MD;  Location:  GI     EYE SURGERY      cataracts     GENITOURINARY SURGERY      Prostate Seen Implants     HERNIA REPAIR      Umbilical     INCISION AND DRAINAGE LOWER EXTREMITY, COMBINED Left 12/1/2017    Procedure: COMBINED INCISION AND DRAINAGE LOWER EXTREMITY;  INCISION AND DRAINAGE OF LEFT BELOW KNEE AMPUTATION ABSCESS, WOUND VAC PLACEMENT;  Surgeon: Saima Howard MD;  Location:  OR     IR IVC FILTER PLACEMENT       IR VISCERAL ANGIOGRAM  4/14/2020     IRRIGATION AND DEBRIDEMENT LOWER EXTREMITY, COMBINED Left 12/6/2017    Procedure: COMBINED IRRIGATION AND DEBRIDEMENT LOWER EXTREMITY;  IRRIGATION AND DEBRIDEMENT OF LEFT BELOW KNEE AMPUTATION SITE WITH WOUND VAC REPLACEMENT;  Surgeon: Saima Howard MD;  Location:  SD     IRRIGATION AND DEBRIDEMENT LOWER EXTREMITY, COMBINED Left 12/8/2017    Procedure: COMBINED IRRIGATION AND DEBRIDEMENT LOWER EXTREMITY;  IRRIGATION AND DEBRIDEMENT OF LEFT BELOW THE KNEE AMPUTATION AND SHORTENING OF THE TIBIA WITH WOUND CLOSURE;  Surgeon: Saima Howard MD;  Location:  OR     LASER HOLMIUM LITHOTRIPSY URETER(S), INSERT STENT, COMBINED  Bilateral 2018    Procedure: COMBINED CYSTOSCOPY, URETEROSCOPY, LASER HOLMIUM LITHOTRIPSY URETER(S), INSERT STENT;  CYSTOSCOPY, BILATERAL URETEROSCOPY,  HOLMIUM LASER LITHOTRIPSY, BILATERAL STENT PLACEMENT, STONE BASKETING;  Surgeon: Jose Hunter MD;  Location:  OR     LASER HOLMIUM LITHOTRIPSY URETER(S), INSERT STENT, COMBINED Left 2018    Procedure: COMBINED CYSTOSCOPY, URETEROSCOPY, LASER HOLMIUM LITHOTRIPSY URETER(S), INSERT STENT;  CYSTOSCOPY, LEFT URETEROSCOPY, LEFT LASER HOLMIUM LITHOTRIPSY URETER(S) REMOVAL BILATERAL STENTS;  Surgeon: Jose Hunter MD;  Location:  OR     ORTHOPEDIC SURGERY      (R) knee scope     ORTHOPEDIC SURGERY      total hip      ORTHOPEDIC SURGERY      elbow surgery       Social History     Tobacco Use     Smoking status: Never Smoker     Smokeless tobacco: Never Used   Substance Use Topics     Alcohol use: Yes     Comment: quit 10/2015; now abt 1-3 vodka's per night     Family History   Problem Relation Age of Onset     Lung Cancer Mother      Heart Failure Father          age 87         Current Outpatient Medications   Medication Sig Dispense Refill     apixaban ANTICOAGULANT (ELIQUIS) 2.5 MG tablet Take 1 tablet (2.5 mg) by mouth daily 90 tablet 3     gabapentin (NEURONTIN) 600 MG tablet Take 600 mg by mouth 2 times daily       pantoprazole (PROTONIX) 40 MG EC tablet        spironolactone (ALDACTONE) 100 MG tablet Take 1 tablet (100 mg) by mouth daily 30 tablet 0     sucralfate (CARAFATE) 1 GM/10ML suspension TK 10ML PO QID BEFORE MEALS AND NIGHTLY       torsemide (DEMADEX) 20 MG tablet Take 2 tablets (40 mg) by mouth every morning 180 tablet 3     Allergies   Allergen Reactions     Blood Transfusion Related (Informational Only) Other (See Comments)     Patient has a history of a clinically significant antibody against RBC antigens.  A delay in compatible RBCs may occur.     Kdc:Minocycline+[place] [facility] GI Disturbance     16-PT IS NOT AWARE  "OF THIS REACTION     Ciprofloxacin Itching     Severe itching \"like ants were crawling\"     Hmg-Coa-R Inhibitors Other (See Comments)     Rhabdomyolysis occurred within a couple days  Rhabdomyolysis       Reviewed and updated as needed this visit by Provider         Review of Systems   Constitutional, HEENT, cardiovascular, pulmonary, gi and gu systems are negative, except as otherwise noted.      Objective    There were no vitals taken for this visit.  Estimated body mass index is 29.01 kg/m  as calculated from the following:    Height as of 3/9/20: 1.88 m (6' 2\").    Weight as of 4/26/20: 102.5 kg (225 lb 15.5 oz).  Physical Exam     He sounds comfortable and seems improved by telephone, mood and affect are normal, insight and judgement remain imparied with respect to his use of alcohol       Diagnostic Test Results:  Labs reviewed in Epic        Assessment & Plan       ICD-10-CM    1. Alcoholic cirrhosis of liver with ascites (H)  K70.31         We had an about 25 minute conversation  He has returned to taking his diuretics  His swelling is well managed  His ascites symptoms are well managed and he understands that he can schedule the paracentesis up to every 2 week as needed  His hemoglobin is improving and there are no signs of ongoing GI blood loss on the Eliquis  We discussed abstaining from all alcohol  We discussed how this could improve his neuropathy symptoms   Will plan for face to face follow up later this summer when it is hopefully safer in terms of COVID            Return in about 3 months (around 9/4/2020) for recheck .  Patient instructed to return to clinic or contact us sooner if symptoms worsen or new symptoms develop.     Main Hardy MD  [facility]      Video-Visit Details    Type of service:  Video Visit    Video End Time:12:17 PM    Originating Location (pt. Location): Home    Distant Location (provider location):  [facility]     Platform used for Video Visit: " Doximity    Return in about 3 months (around 9/4/2020) for recheck .   Patient instructed to return to clinic or contact us sooner if symptoms worsen or new symptoms develop.     Main Hardy MD

## 2020-06-12 NOTE — TELEPHONE ENCOUNTER
Reason for Call:  Home Health Care    Lucrecia with [facility] called regarding      Patient update from [facility]    PT: Patient  missed diuretic medication the lst two days  No effect on swelling.  PT had patient take diuretics during appointment today. BP was increased to 128/70.  Patient had increased unsteadiness and poor balance. But may be due to poor nutrition over the las 24 hours.    A fall reported on 6/7/20 in his apartment. He had to call 911 to help him up from the floor. The patient denied hitting his head, but said a lamp fell on his head.   He also has a wound to right shin.      Pt Provider: Antony    Phone Number Homecare Nurse can be reached at: [phone]    Can we leave a detailed message on this number? YES    Phone number patient can be reached at: Home number on file [phone] (home)    Best Time:     Call taken on 6/12/2020 at 2:10 PM by Arabella Guadarrama

## 2020-06-12 NOTE — TELEPHONE ENCOUNTER
Pre-Procedure Unconfirmed COVID Test     COVID Screening  Due to the inability to confirm the patient's COVID status (positive or negative), the patient was screened for COVID symptoms     Patient reports the following:  Fever? No   Cough? No   Shortness of breath? No   Skin rash? No    If the patient is positive for new symptoms or worsening symptoms, and the procedure is deemed necessary by the ordering provider, notify your manager/supervisor     Patient Information  Patient informed of the no visitor policy  Patient instructed to continue to self-quarantine prior to procedure  Patient informed to contact the ordering provider if the following symptoms develop prior to procedure:   Fever  Cough  Shortness of Breath  Sore throat   Runny or stuffy nose  Muscle or body aches  Headaches  Fatigue  Vomiting or diarrhea   Rash    Rekha Garcia RN

## 2020-06-12 NOTE — TELEPHONE ENCOUNTER
FYI ONLY:     Tried calling PT, no answer.     Called patient -   Missed diuretic 2 days but then took it today. Edema is fine   Not much dinner last night otherwise is okay   Normally thinks his BP is 104-106/?? (he wasn't sure about diastolic)   Fell 6/7, paramedics came/examined - they said he was ok    He is fine per pt and denies any concerns     Yoly NEW RN

## 2020-06-15 NOTE — PROGRESS NOTES
Care Suites Admission Nursing Note    Patient Information  Name: Antonio Ramirez  Age: 75 year old  Reason for admission: paracentesis   Care Suites arrival time: 1315     Patient Admission/Assessment   Pre-procedure assessment complete: Yes  If abnormal assessment/labs, provider notified: No  NPO: Yes  Medications held per instructions/orders: Yes  Consent: obtained  If applicable, pregnancy test status: declined  Patient oriented to room: Yes  Education/questions answered: Yes  Plan/other: proceed    Discharge Planning  Accompanied by: self  Overnight post sedation caregiver: Helena  Discharge location: home    Davie Merritt RN     PATIENT WELLNESS SCREENING    Step 1: Answer all screening questions 1-3.    1. In the last month, have you been in contact with someone who was confirmed or suspected to have Coronavirus/COVID-19? No    2. Do you have the following symptoms?   Fever? No   Cough? No   Shortness of breath? No   Skin rash? No    Step 2: Refer to logic grid below for actions    NO SYMPTOM(S)    ACTIONS:  1. Standard rooming process  2. Provider to assess per normal protocol    POSITIVE SYMPTOM(S)  If positive for ANY of the following symptoms: fever, cough, shortness of breath, rash    ACTIONS  1. Mask patient  2. Room patient as soon as possible  3. Don appropriate PPE when entering room  4. Provider evaluation

## 2020-06-15 NOTE — DISCHARGE INSTRUCTIONS

## 2020-06-15 NOTE — DISCHARGE SUMMARY
Care Suites Discharge Nursing Note    Patient Information  Name: Antonio Ramirez  Age: 75 year old    Discharge       Patient discharged to home after imaging revealed there was no fluid in abdominal space.

## 2020-06-16 NOTE — TELEPHONE ENCOUNTER
Routing refill request to provider for review/approval because:  Drug not on the [facility] refill protocol   Medication is reported/historical      Please review and authorize if appropriate,     Thank you,   Birdie DOBBS RN

## 2020-06-17 NOTE — TELEPHONE ENCOUNTER
Reason for Call:  Home Health Care    Eneida with [facility] called regarding (reason for call):     Orders are needed for this patient.     : to discuss community resources     Phone Number Homecare Nurse can be reached at: [phone]    Can we leave a detailed message on this number? YES    Phone number patient can be reached at: [phone]    Best Time: any    Call taken on 6/17/2020 at 12:55 PM by Lilliana Stovall

## 2020-06-17 NOTE — TELEPHONE ENCOUNTER
Called Eneida with [facility] to provide verbal orders for SW as requested    Ruy GIBSON RN

## 2020-06-18 NOTE — PROGRESS NOTES
"Antonio Ramirez is a 75 year old male who is being evaluated via a billable telephone visit.      The patient has been notified of following:     \"This telephone visit will be conducted via a call between you and your physician/provider. We have found that certain health care needs can be provided without the need for a physical exam.  This service lets us provide the care you need with a short phone conversation.  If a prescription is necessary we can send it directly to your pharmacy.  If lab work is needed we can place an order for that and you can then stop by our lab to have the test done at a later time.    Telephone visits are billed at different rates depending on your insurance coverage. During this emergency period, for some insurers they may be billed the same as an in-person visit.  Please reach out to your insurance provider with any questions.    If during the course of the call the physician/provider feels a telephone visit is not appropriate, you will not be charged for this service.\"    Patient has given verbal consent for Telephone visit?  Yes    What phone number would you like to be contacted at? [phone]    How would you like to obtain your AVS? Kumar Jones     Antonio Ramirez is a 75 year old male who presents via phone visit today for the following health issues:    HPI      75-year-old man with multiple medical problems including antiphospholipid antibody syndrome, history of pulmonary embolism, recent GI bleed, cardiomyopathy, cirrhosis from alcoholism, active alcohol abuse, peripheral neuropathy, status post amputation, history of traumatic subdural hematoma.  He lives in a private home with his wife.  He recently went for a routine paracentesis but was found to not have any ascites fluid to remove.  Previously, he was having large volumes of ascites fluid removed on a every other week basis.  He calls with concerns about a several week history of rather vague symptoms.  He " "describes intermittent spells of feeling unsteady that last anywhere from several minutes to up to 2 days.  His physical therapist suggested that he might see a neurologist for this problem.  He relays concerns that he might have parkinsonism or Laura Gehrig's disease?  He admits to having a recent fall but also admits that that was in the setting of \"having a little too much to drink\".  That being said, he does not feel that his spells as described previously have anything to do with his use of alcohol.  He denies persistent focal numbness or weakness that is new or new vision change or dysarthria or headache.  Of note, upon recently being hospitalized for gastrointestinal hemorrhage his anticoagulant was changed from low-dose Xarelto to low-dose Eliquis.  His hemoglobin has been stable since making that change.    Patient Active Problem List   Diagnosis     Alcohol abuse     Alcoholic cirrhosis of liver (H)     Chronic kidney disease, stage III (moderate) (H)     Physical deconditioning     Prostate cancer -- S/P Radiation Seed implants 2000 (no problems since)     Antiphospholipid antibody syndrome (H)     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 (H)     Thrombocytopenia -- suspect related to alcohol use     Benign essential hypertension     Malabsorption of iron     Calculi, ureter     Anemia     CAD, S/P CABG in 2007     Aortic stenosis     Nonrheumatic mitral valve regurgitation     Nonrheumatic tricuspid valve regurgitation     Pulmonary hypertension (H)     Paroxysmal atrial fibrillation (H)     Ischemic cardiomyopathy     Venous thrombosis     S/P total hip arthroplasty     S/P Left BKA 2017     Acute cystitis without hematuria     Traumatic hematoma of buttock     Acute blood loss anemia     CRF (chronic renal failure), stage 3 (moderate) (H)     MSSA bacteremia     H/O fall, recent     Hematoma of left lower extremity, subsequent encounter     Supratherapeutic INR     H/O deep venous thrombosis     " History of pulmonary embolism     Essential hypertension     Chronic systolic congestive heart failure (H)     CKD (chronic kidney disease) stage 3, GFR 30-59 ml/min (H)     Phantom limb pain (H)     Debility     Osteomyelitis (H)     Abnormal liver function test     Altered mental status     Amputation stump complication (H)     Ascites due to alcoholic cirrhosis (H)     Balance problem     Calculus of kidney     Coagulopathy (H)     Hepatic cirrhosis (H)     Hx MRSA infection     Long term (current) use of anticoagulants     Lymphoproliferative disorder, low grade B cell (H)     Monoclonal gammopathy     Open wound of foot except toes with tendon involvement     Overweight (BMI 25.0-29.9)     Neuropathy     Peripheral neuropathy, idiopathic     Personal history of malignant neoplasm of prostate     Phlebitis and thrombophlebitis of superficial vessels of lower extremities     PVD (peripheral vascular disease) (H)     Renal dysfunction     S/P CABG (coronary artery bypass graft)     Statin intolerance     Therapeutic drug monitoring     Thoracic aortic aneurysm (TAA) (H)     Vitamin D deficiency     Bursitis of left knee     Pulmonary embolism (H)     Arthritis of knee, degenerative     Ulcer of left lower extremity, limited to breakdown of skin (H)     Lymphedema of both lower extremities     Rectal bleeding     Past Surgical History:   Procedure Laterality Date     AMPUTATE LEG BELOW KNEE Left 04/2014    related to gangrene, started as foot ulcer related to neuropathy     AMPUTATE LEG BELOW KNEE Left 12/4/2017    Procedure: AMPUTATE LEG BELOW KNEE;  Exploration of Left Leg Below Knee Amputation Stump with Ligation of Bleeders.;  Surgeon: Leandro Arreola MD;  Location:  OR     APPENDECTOMY       APPLY WOUND VAC Left 12/6/2017    Procedure: APPLY WOUND VAC;;  Surgeon: Saima Howard MD;  Location:  SD     ARTHROPLASTY HIP Right 11/27/2018    Procedure: RIGHT TOTAL HIP ARTHROPLASTY;  Surgeon: Lee  Saima STEWARD MD;  Location:  OR     ARTHROPLASTY KNEE Right 9/19/2014    Procedure: ARTHROPLASTY KNEE;  Surgeon: Saima Howard MD;  Location:  OR     CARDIAC SURGERY      CABG     CHOLECYSTECTOMY       COLONOSCOPY       COLONOSCOPY N/A 4/1/2020    Procedure: COLONOSCOPY;  Surgeon: Emeka Freeman DO;  Location:  GI     COMBINED CYSTOSCOPY, RETROGRADES, EXCHANGE STENT URETER(S) Left 7/24/2018    Procedure: COMBINED CYSTOSCOPY, RETROGRADES, EXCHANGE STENT URETER(S);  CYSTOSCOPY, BILATERAL RETROGRADES, BILATERAL URETERAL STENT EXCHANGE ;  Surgeon: Matt Cervantes MD;  Location:  OR     CRANIOTOMY Right 4/7/2018    Procedure: CRANIOTOMY;  Right Craniotomy For Subdural Hematoma;  Surgeon: Jono Issa MD;  Location:  OR     CYSTOSCOPY, DILATE URETHRA, COMBINED N/A 10/12/2016    Procedure: COMBINED CYSTOSCOPY, DILATE URETHRA;  Surgeon: Dimitry Bello MD;  Location:  OR     CYSTOSCOPY, REMOVE STENT(S), COMBINED Right 7/24/2018    Procedure: COMBINED CYSTOSCOPY, REMOVE STENT(S);;  Surgeon: Jose Hunter MD;  Location:  OR     ENDOSCOPIC STRIPPING VEIN(S)       ENTEROSCOPY SMALL BOWEL N/A 4/8/2020    Procedure: ENTEROSCOPY;  Surgeon: Katherin Boyd MD;  Location:  OR     esophagogastroduodenoscopy       ESOPHAGOSCOPY, GASTROSCOPY, DUODENOSCOPY (EGD), COMBINED N/A 3/11/2019    Procedure: COMBINED ESOPHAGOSCOPY, GASTROSCOPY, DUODENOSCOPY (EGD), BIOPSY SINGLE OR MULTIPLE;  Surgeon: Katherin Boyd MD;  Location:  GI     ESOPHAGOSCOPY, GASTROSCOPY, DUODENOSCOPY (EGD), COMBINED N/A 9/27/2019    Procedure: ESOPHAGOGASTRODUODENOSCOPY, WITH FOREIGN BODY REMOVAL;  Surgeon: Raegan Mckeon MD;  Location:  GI     ESOPHAGOSCOPY, GASTROSCOPY, DUODENOSCOPY (EGD), COMBINED N/A 4/1/2020    Procedure: ESOPHAGOGASTRODUODENOSCOPY (EGD);  Surgeon: Emeka Freeman DO;  Location:  GI     ESOPHAGOSCOPY, GASTROSCOPY, DUODENOSCOPY (EGD), COMBINED N/A 4/9/2020     Procedure: Esophagoscopy, gastroscopy, duodenoscopy (EGD), combined;  Surgeon: Katherin Boyd MD;  Location:  GI     EYE SURGERY      cataracts     GENITOURINARY SURGERY      Prostate Seen Implants     HERNIA REPAIR      Umbilical     INCISION AND DRAINAGE LOWER EXTREMITY, COMBINED Left 12/1/2017    Procedure: COMBINED INCISION AND DRAINAGE LOWER EXTREMITY;  INCISION AND DRAINAGE OF LEFT BELOW KNEE AMPUTATION ABSCESS, WOUND VAC PLACEMENT;  Surgeon: Saima Howard MD;  Location:  OR     IR IVC FILTER PLACEMENT       IR VISCERAL ANGIOGRAM  4/14/2020     IRRIGATION AND DEBRIDEMENT LOWER EXTREMITY, COMBINED Left 12/6/2017    Procedure: COMBINED IRRIGATION AND DEBRIDEMENT LOWER EXTREMITY;  IRRIGATION AND DEBRIDEMENT OF LEFT BELOW KNEE AMPUTATION SITE WITH WOUND VAC REPLACEMENT;  Surgeon: Saima Howard MD;  Location: [facility]     IRRIGATION AND DEBRIDEMENT LOWER EXTREMITY, COMBINED Left 12/8/2017    Procedure: COMBINED IRRIGATION AND DEBRIDEMENT LOWER EXTREMITY;  IRRIGATION AND DEBRIDEMENT OF LEFT BELOW THE KNEE AMPUTATION AND SHORTENING OF THE TIBIA WITH WOUND CLOSURE;  Surgeon: Saima Howard MD;  Location:  OR     LASER HOLMIUM LITHOTRIPSY URETER(S), INSERT STENT, COMBINED Bilateral 6/28/2018    Procedure: COMBINED CYSTOSCOPY, URETEROSCOPY, LASER HOLMIUM LITHOTRIPSY URETER(S), INSERT STENT;  CYSTOSCOPY, BILATERAL URETEROSCOPY,  HOLMIUM LASER LITHOTRIPSY, BILATERAL STENT PLACEMENT, STONE BASKETING;  Surgeon: Jose Hunter MD;  Location:  OR     LASER HOLMIUM LITHOTRIPSY URETER(S), INSERT STENT, COMBINED Left 8/14/2018    Procedure: COMBINED CYSTOSCOPY, URETEROSCOPY, LASER HOLMIUM LITHOTRIPSY URETER(S), INSERT STENT;  CYSTOSCOPY, LEFT URETEROSCOPY, LEFT LASER HOLMIUM LITHOTRIPSY URETER(S) REMOVAL BILATERAL STENTS;  Surgeon: Jose Hunter MD;  Location:  OR     ORTHOPEDIC SURGERY      (R) knee scope     ORTHOPEDIC SURGERY      total hip      ORTHOPEDIC SURGERY      elbow  "surgery       Social History     Tobacco Use     Smoking status: Never Smoker     Smokeless tobacco: Never Used   Substance Use Topics     Alcohol use: Yes     Comment: quit 10/2015; now abt 1-3 vodka's per night     Family History   Problem Relation Age of Onset     Lung Cancer Mother      Heart Failure Father          age 87         Current Outpatient Medications   Medication Sig Dispense Refill     apixaban ANTICOAGULANT (ELIQUIS) 2.5 MG tablet Take 1 tablet (2.5 mg) by mouth daily 90 tablet 3     gabapentin (NEURONTIN) 600 MG tablet TAKE 1 TABLET(600 MG) BY MOUTH TWICE DAILY 180 tablet 3     pantoprazole (PROTONIX) 40 MG EC tablet        spironolactone (ALDACTONE) 100 MG tablet Take 1 tablet (100 mg) by mouth daily 30 tablet 0     sucralfate (CARAFATE) 1 GM/10ML suspension TK 10ML PO QID BEFORE MEALS AND NIGHTLY       torsemide (DEMADEX) 20 MG tablet Take 2 tablets (40 mg) by mouth every morning 180 tablet 3     Allergies   Allergen Reactions     Blood Transfusion Related (Informational Only) Other (See Comments)     Patient has a history of a clinically significant antibody against RBC antigens.  A delay in compatible RBCs may occur.     Kdc:Minocycline+[place] [facility] GI Disturbance     16-PT IS NOT AWARE OF THIS REACTION     Ciprofloxacin Itching     Severe itching \"like ants were crawling\"     Hmg-Coa-R Inhibitors Other (See Comments)     Rhabdomyolysis occurred within a couple days  Rhabdomyolysis       Reviewed and updated as needed this visit by Provider         Review of Systems   Constitutional, HEENT, cardiovascular, pulmonary, gi and gu systems are negative, except as otherwise noted.       Objective   Reported vitals:  Wt 102.1 kg (225 lb)   BMI 29.69 kg/m     He sounds healthy, alert and no distress  PSYCH: Alert and oriented times 3; coherent speech, normal   rate and volume, no tangential thoughts, no hallucinations   or delusions  His affect is normal  RESP: No cough, no audible " wheezing, able to talk in full sentences  Remainder of exam unable to be completed due to telephone visits    Diagnostic Test Results:  Labs reviewed in Epic        Assessment/Plan:  1. Dizziness  This is a very pleasant man with multiple serious medical problems including that of cirrhosis related to alcohol who continues to use alcohol unfortunately.  He describes a rather vague set of symptoms that are intermittent and have been present for several weeks.  The prominent symptom is that of a feeling of dizziness or unsteadiness.  He did have a recent fall in the setting of alcohol use.  He did start a new anticoagulant following a hospitalization for a GI bleed.  I think it would be prudent to obtain a CT scan of the head to exclude any new hemorrhage that might explain his symptoms.  If that is negative, we discussed a trial of a lower dose of torsemide to avoid dizziness spells.  However, honestly, it is impossible to diagnose the nature of his problem over the telephone.  I was honest with him stating that that is the case.  We discussed arranging for a face-to-face visit as soon as I am back in the office which is unfortunately July 1.  We discussed that he should report to the emergency department if symptoms worsen or new symptoms develop between then and now.  We discussed the possibility of a neurology consultation although he would prefer to start evaluation with a visit with his primary care physician and be referred to neurology if the diagnosis is unclear after that evaluation.      Return in about 13 days (around 7/1/2020) for Face-to-face visit with Dr. Hardy to investigate symptoms.   Patient instructed to return to clinic or contact us sooner or go to ER if symptoms worsen or new symptoms develop.     Phone call duration:  27 minutes    Main Hardy MD

## 2020-06-25 NOTE — PROGRESS NOTES
Clinic Care Coordination Contact  OUTREACH    Referral Information:  Referral Source: Home Care    Primary Diagnosis: GI Disorders    Chief Complaint   Patient presents with     Clinic Care Coordination - Initial     home care discharge        Universal Utilization: Recently discharged from Home Care.   Clinic Utilization  Difficulty keeping appointments:: No  Compliance Concerns: No  No-Show Concerns: No  No PCP office visit in Past Year: No  Utilization    Last refreshed: 6/25/2020  6:16 AM:  Hospital Admissions 25           Last refreshed: 6/25/2020  6:16 AM:  ED Visits 4           Last refreshed: 6/25/2020  6:16 AM:  No Show Count (past year) 0              Current as of: 6/25/2020  6:16 AM            Clinical Concerns:  Current Medical Concerns:    Patient Active Problem List   Diagnosis     Alcohol abuse     Alcoholic cirrhosis of liver (H)     Chronic kidney disease, stage III (moderate) (H)     Physical deconditioning     Prostate cancer -- S/P Radiation Seed implants 2000 (no problems since)     Antiphospholipid antibody syndrome (H)     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 (H)     Thrombocytopenia -- suspect related to alcohol use     Benign essential hypertension     Malabsorption of iron     Calculi, ureter     Anemia     CAD, S/P CABG in 2007     Aortic stenosis     Nonrheumatic mitral valve regurgitation     Nonrheumatic tricuspid valve regurgitation     Pulmonary hypertension (H)     Paroxysmal atrial fibrillation (H)     Ischemic cardiomyopathy     Venous thrombosis     S/P total hip arthroplasty     S/P Left BKA 2017     Acute cystitis without hematuria     Traumatic hematoma of buttock     Acute blood loss anemia     CRF (chronic renal failure), stage 3 (moderate) (H)     MSSA bacteremia     H/O fall, recent     Hematoma of left lower extremity, subsequent encounter     Supratherapeutic INR     H/O deep venous thrombosis     History of pulmonary embolism     Essential hypertension      Chronic systolic congestive heart failure (H)     CKD (chronic kidney disease) stage 3, GFR 30-59 ml/min (H)     Phantom limb pain (H)     Debility     Osteomyelitis (H)     Abnormal liver function test     Altered mental status     Amputation stump complication (H)     Ascites due to alcoholic cirrhosis (H)     Balance problem     Calculus of kidney     Coagulopathy (H)     Hepatic cirrhosis (H)     Hx MRSA infection     Long term (current) use of anticoagulants     Lymphoproliferative disorder, low grade B cell (H)     Monoclonal gammopathy     Open wound of foot except toes with tendon involvement     Overweight (BMI 25.0-29.9)     Neuropathy     Peripheral neuropathy, idiopathic     Personal history of malignant neoplasm of prostate     Phlebitis and thrombophlebitis of superficial vessels of lower extremities     PVD (peripheral vascular disease) (H)     Renal dysfunction     S/P CABG (coronary artery bypass graft)     Statin intolerance     Therapeutic drug monitoring     Thoracic aortic aneurysm (TAA) (H)     Vitamin D deficiency     Bursitis of left knee     Pulmonary embolism (H)     Arthritis of knee, degenerative     Ulcer of left lower extremity, limited to breakdown of skin (H)     Lymphedema of both lower extremities     Rectal bleeding    RNCC outreached to patient to follow up how he is doing after discharging from home care. Patient reports he is about where he was to begin with. Reviewed last virtual visit with Primary Care Provider that recommends face to face appointment around 07/01, not currently scheduled. Informed patient of this and patient states understanding that the clinic was supposed to call him to schedule the appointment.   Continues to feel off balance. Using walker which helps steady him.   Denies SOB, edema. Reports following low salt diet. Not checking blood pressures.   Couple drinks/day. Not interested in cessation options.     Current Behavioral Concerns: Difficult to  understand patient at times, speech hoarse, muffled.     Education Provided to patient: CC role.   Pain  Pain (GOAL):: No  Health Maintenance Reviewed: Due/Overdue   Health Maintenance Topics with due status: Overdue       Topic Date Due    HF ACTION PLAN 1945    LIPID 1945    MEDICARE ANNUAL WELLNESS VISIT 03/25/2008    ZOSTER IMMUNIZATION 02/26/2013     Clinical Pathway: None    Medication Management:  Patient independently manages own medications. Reports taking medications as prescribed. No concerns.  Post Discharge Medication Reconciliation Status: patient was not discharged from an inpatient facility. Medications reviewed and reconciled.       Functional Status:  Dependent ADLs:: Ambulation-walker  Bed or wheelchair confined:: No  Mobility Status: Independent w/Device    Living Situation:  Current living arrangement:: I live in a private home with spouse  Type of residence:: Apartment    Lifestyle & Psychosocial Needs:  Lifestyle     Physical activity     Days per week: 0 days     Minutes per session: 0 min     Stress: Not at all     Social Needs     Financial resource strain: Not hard at all     Food insecurity     Worry: Never true     Inability: Never true     Transportation needs     Medical: No     Non-medical: No     Diet:: No added salt  Inadequate nutrition (GOAL):: No(appetite comes and goes)  Tube Feeding: No  Inadequate activity/exercise (GOAL):: No  Significant changes in sleep pattern (GOAL): No  Mormonism or spiritual beliefs that impact treatment:: No  Mental health DX:: No  Mental health management concern (GOAL):: No  Informal Support system:: Spouse   Socioeconomic History     Marital status:      Spouse name: Not on file     Number of children: 2     Years of education: Not on file     Highest education level: Not on file   Occupational History     Occupation: Retired    Relationships     Social connections     Talks on phone: Not on file     Gets together: Not  on file     Attends Yarsanism service: Not on file     Active member of club or organization: Not on file     Attends meetings of clubs or organizations: Not on file     Relationship status:      Intimate partner violence     Fear of current or ex partner: No     Emotionally abused: No     Physically abused: No     Forced sexual activity: No     Tobacco Use     Smoking status: Never Smoker     Smokeless tobacco: Never Used   Substance and Sexual Activity     Alcohol use: Yes     Frequency: 4 or more times a week     Drinks per session: 1 or 2     Binge frequency: Never     Comment: quit 10/2015; now abt 1-3 vodka's per night     Drug use: No     Sexual activity: Not Currently     Partners: Female      Resources and Interventions:  Current Resources:   Community Resources: DME  Supplies used at home:: None  Equipment Currently Used at Home: wheelchair, manual, shower chair, walker, rolling, grab bar, toilet, grab bar, tub/shower  Advance Care Plan/Directive  Advanced Care Plans/Directives on file:: In process(Has, just not on file)  Advanced Care Plan/Directive Status: In Process(Encouraged and informed patient to provide copy to care team.)    Referrals Placed: None     Goals: NA    Patient/Caregiver understanding: Patient verbalized understanding and denies any additional questions or concerns at this time. RNCC engaged in [employer] communications during encounter.      Future Appointments              In 4 days SH BODY RAD;  IMAGING NURSE; [facility] ANA    In 2 weeks SH BODY RAD;  IMAGING NURSE; [facility]    In 1 month SH BODY RAD; SH IMAGING NURSE; [facility]    In 1 month SH BODY RAD; SH BODY RAD;  IMAGING NURSE; [facility] ANA    In 2 months SH BODY RAD; SH BODY RAD; SH IMAGING NURSE; [facility] ANA    In 2 months  Main Hardy MD [facility] JULISA Joseph    In 2 months SH BODY RAD; SH BODY RAD; SH IMAGING NURSE; [facility] ANA          Plan: RNCC will route encounter to  pool with request to contact patient to assist in scheduling in person visit with Primary Care Provider. At this time, patient denies outstanding need for connection or referral to resources or assistance navigating recommended follow up care. No further Care Coordination outreaches scheduled at this time, patient provided with this writer's number and encouraged to call with future needs or questions.     Benita Tuttle RN Care Coordinator  [facility]  Email: [email or web address]  Phone: [phone]

## 2020-06-29 NOTE — LETTER
[facility]  [address] [facility]  [address]  [place]  [address]  Tel: [phone]    June 30, 2020    Antonio KU Justinsolange  [address]   [facility] [address]        Dear Mr. Ramirez,    The following letter pertains to your most recent diagnostic tests:     Good news! The CT scan does not show any new bleeding in the brain to explain your new symptoms.      If you have any further questions or problems, please contact our office.      Sincerely,    Main Hardy MD/leela          Enclosure:  Results                                    Results for orders placed or performed during the hospital encounter of 06/29/20   CT Head w/o Contrast     Status: None    Narrative    CT SCAN OF THE HEAD WITHOUT CONTRAST   6/29/2020 5:08 PM     HISTORY: Dizziness, anticoagulation, fall. Dizziness.    TECHNIQUE:  Axial images of the head and coronal reformations without  IV contrast material. Radiation dose for this scan was reduced using  automated exposure control, adjustment of the mA and/or kV according  to patient size, or iterative reconstruction technique.    COMPARISON: CT head 9/1/2019.    FINDINGS: Postoperative changes from right frontotemporal craniotomy  are again noted. Mild dural thickening underlying the craniotomy flap,  unchanged from prior studies. There is moderate global brain  parenchymal volume loss, including bilateral hippocampal/mesial  temporal volume loss with ex vacuo prominence of the temporal horns of  both lateral ventricles. Chronic cortical/subcortical infarcts in the  high left frontal lobe and also in the left basal ganglia region are  unchanged. Mild to moderate scattered patchy hypoattenuation in the  periventricular and deep cerebral white matter, likely representing  chronic small vessel ischemic disease. No evidence for acute  intracranial hemorrhage, mass lesion, or mass effect. No  hydrocephalus.    Right lens replacement. Visualized orbits otherwise appear within  normal  limits. Small area of focal polypoid mucosal thickening in the  anterior left ethmoid air cells. The visualized aspects of the  paranasal sinuses are otherwise free of significant disease. Mastoid  and middle ear cavities are clear.      Impression    IMPRESSION:  1. No acute intracranial abnormality.  2. Grossly unchanged chronic findings, as detailed in the body of the  report.    CARMEN EUBANKS MD

## 2020-06-30 NOTE — RESULT ENCOUNTER NOTE
The following letter pertains to your most recent diagnostic tests:    Good news! The CT scan does not show any new bleeding in the brain to explain your new symptoms.        Sincerely,    Dr. Hardy

## 2020-07-02 NOTE — PROGRESS NOTES
"Subjective     Antonio Ramirez is a 75 year old male who presents to clinic today for the following health issues:    HPI       2 week follow up - Dizziness    Pleasant 75 year old with multiple complex problems including cirrhosis with ascities from alcohol with ongoing alcohol consumption, antiphospholipid antibody syndrome with history of VTE, PVD, heart failure, CAD, peripheral neuropathy, history of falls with head injury with hemorrhage.      He called 2 weeks ago with concerns about imbalance and falls    He was asked to return for visit and referred for head CT which was stable    His symptoms have been present for years    They are not always associated with alcohol consumption    His wife states that he has not cut down on drinking    He was told by a neurologist in [place] that he has peripheral neuropathy and that it is never going to get better    He had a muscle biopsy in 2012 demonstrating \"neurogenic atrophy\"    He wondered if he should return to neurology?    Patient Active Problem List   Diagnosis     Alcohol abuse     Alcoholic cirrhosis of liver (H)     Chronic kidney disease, stage III (moderate) (H)     Physical deconditioning     Prostate cancer -- S/P Radiation Seed implants 2000 (no problems since)     Antiphospholipid antibody syndrome (H)     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 (H)     Thrombocytopenia -- suspect related to alcohol use     Benign essential hypertension     Malabsorption of iron     Calculi, ureter     Anemia     CAD, S/P CABG in 2007     Aortic stenosis     Nonrheumatic mitral valve regurgitation     Nonrheumatic tricuspid valve regurgitation     Pulmonary hypertension (H)     Paroxysmal atrial fibrillation (H)     Ischemic cardiomyopathy     Venous thrombosis     S/P total hip arthroplasty     S/P Left BKA 2017     Acute cystitis without hematuria     Traumatic hematoma of buttock     Acute blood loss anemia     CRF (chronic renal failure), stage 3 (moderate) (H) "     MSSA bacteremia     H/O fall, recent     Hematoma of left lower extremity, subsequent encounter     Supratherapeutic INR     H/O deep venous thrombosis     History of pulmonary embolism     Essential hypertension     Chronic systolic congestive heart failure (H)     CKD (chronic kidney disease) stage 3, GFR 30-59 ml/min (H)     Phantom limb pain (H)     Debility     Osteomyelitis (H)     Abnormal liver function test     Altered mental status     Amputation stump complication (H)     Ascites due to alcoholic cirrhosis (H)     Balance problem     Calculus of kidney     Coagulopathy (H)     Hepatic cirrhosis (H)     Hx MRSA infection     Long term (current) use of anticoagulants     Lymphoproliferative disorder, low grade B cell (H)     Monoclonal gammopathy     Open wound of foot except toes with tendon involvement     Overweight (BMI 25.0-29.9)     Neuropathy     Peripheral neuropathy, idiopathic     Personal history of malignant neoplasm of prostate     Phlebitis and thrombophlebitis of superficial vessels of lower extremities     PVD (peripheral vascular disease) (H)     Renal dysfunction     S/P CABG (coronary artery bypass graft)     Statin intolerance     Therapeutic drug monitoring     Thoracic aortic aneurysm (TAA) (H)     Vitamin D deficiency     Bursitis of left knee     Pulmonary embolism (H)     Arthritis of knee, degenerative     Ulcer of left lower extremity, limited to breakdown of skin (H)     Lymphedema of both lower extremities     Rectal bleeding     Past Surgical History:   Procedure Laterality Date     AMPUTATE LEG BELOW KNEE Left 04/2014    related to gangrene, started as foot ulcer related to neuropathy     AMPUTATE LEG BELOW KNEE Left 12/4/2017    Procedure: AMPUTATE LEG BELOW KNEE;  Exploration of Left Leg Below Knee Amputation Stump with Ligation of Bleeders.;  Surgeon: Leandro Arreola MD;  Location: [facility]     APPENDECTOMY       APPLY WOUND VAC Left 12/6/2017    Procedure: APPLY WOUND  VAC;;  Surgeon: Saima Howard MD;  Location:  [facility]     ARTHROPLASTY HIP Right 11/27/2018    Procedure: RIGHT TOTAL HIP ARTHROPLASTY;  Surgeon: Saima Howard MD;  Location:  OR     ARTHROPLASTY KNEE Right 9/19/2014    Procedure: ARTHROPLASTY KNEE;  Surgeon: Saima Howard MD;  Location:  OR     CARDIAC SURGERY      CABG     CHOLECYSTECTOMY       COLONOSCOPY       COLONOSCOPY N/A 4/1/2020    Procedure: COLONOSCOPY;  Surgeon: Emeka Freeman DO;  Location:  GI     COMBINED CYSTOSCOPY, RETROGRADES, EXCHANGE STENT URETER(S) Left 7/24/2018    Procedure: COMBINED CYSTOSCOPY, RETROGRADES, EXCHANGE STENT URETER(S);  CYSTOSCOPY, BILATERAL RETROGRADES, BILATERAL URETERAL STENT EXCHANGE ;  Surgeon: Matt Cervantes MD;  Location:  OR     CRANIOTOMY Right 4/7/2018    Procedure: CRANIOTOMY;  Right Craniotomy For Subdural Hematoma;  Surgeon: Jono Issa MD;  Location:  OR     CYSTOSCOPY, DILATE URETHRA, COMBINED N/A 10/12/2016    Procedure: COMBINED CYSTOSCOPY, DILATE URETHRA;  Surgeon: Dimitry Bello MD;  Location:  OR     CYSTOSCOPY, REMOVE STENT(S), COMBINED Right 7/24/2018    Procedure: COMBINED CYSTOSCOPY, REMOVE STENT(S);;  Surgeon: Jose Hunter MD;  Location:  OR     ENDOSCOPIC STRIPPING VEIN(S)       ENTEROSCOPY SMALL BOWEL N/A 4/8/2020    Procedure: ENTEROSCOPY;  Surgeon: Katherin Boyd MD;  Location:  OR     esophagogastroduodenoscopy       ESOPHAGOSCOPY, GASTROSCOPY, DUODENOSCOPY (EGD), COMBINED N/A 3/11/2019    Procedure: COMBINED ESOPHAGOSCOPY, GASTROSCOPY, DUODENOSCOPY (EGD), BIOPSY SINGLE OR MULTIPLE;  Surgeon: Katherin Boyd MD;  Location:  GI     ESOPHAGOSCOPY, GASTROSCOPY, DUODENOSCOPY (EGD), COMBINED N/A 9/27/2019    Procedure: ESOPHAGOGASTRODUODENOSCOPY, WITH FOREIGN BODY REMOVAL;  Surgeon: Raegan Mckeon MD;  Location:  GI     ESOPHAGOSCOPY, GASTROSCOPY, DUODENOSCOPY (EGD), COMBINED N/A 4/1/2020    Procedure:  ESOPHAGOGASTRODUODENOSCOPY (EGD);  Surgeon: Emeka Freeman DO;  Location:  GI     ESOPHAGOSCOPY, GASTROSCOPY, DUODENOSCOPY (EGD), COMBINED N/A 4/9/2020    Procedure: Esophagoscopy, gastroscopy, duodenoscopy (EGD), combined;  Surgeon: Katherin Boyd MD;  Location:  GI     EYE SURGERY      cataracts     GENITOURINARY SURGERY      Prostate Seen Implants     HERNIA REPAIR      Umbilical     INCISION AND DRAINAGE LOWER EXTREMITY, COMBINED Left 12/1/2017    Procedure: COMBINED INCISION AND DRAINAGE LOWER EXTREMITY;  INCISION AND DRAINAGE OF LEFT BELOW KNEE AMPUTATION ABSCESS, WOUND VAC PLACEMENT;  Surgeon: Saima Howard MD;  Location:  OR     IR IVC FILTER PLACEMENT       IR VISCERAL ANGIOGRAM  4/14/2020     IRRIGATION AND DEBRIDEMENT LOWER EXTREMITY, COMBINED Left 12/6/2017    Procedure: COMBINED IRRIGATION AND DEBRIDEMENT LOWER EXTREMITY;  IRRIGATION AND DEBRIDEMENT OF LEFT BELOW KNEE AMPUTATION SITE WITH WOUND VAC REPLACEMENT;  Surgeon: Saima Howard MD;  [facility]     IRRIGATION AND DEBRIDEMENT LOWER EXTREMITY, COMBINED Left 12/8/2017    Procedure: COMBINED IRRIGATION AND DEBRIDEMENT LOWER EXTREMITY;  IRRIGATION AND DEBRIDEMENT OF LEFT BELOW THE KNEE AMPUTATION AND SHORTENING OF THE TIBIA WITH WOUND CLOSURE;  Surgeon: Saima Howard MD;  Location:  OR     LASER HOLMIUM LITHOTRIPSY URETER(S), INSERT STENT, COMBINED Bilateral 6/28/2018    Procedure: COMBINED CYSTOSCOPY, URETEROSCOPY, LASER HOLMIUM LITHOTRIPSY URETER(S), INSERT STENT;  CYSTOSCOPY, BILATERAL URETEROSCOPY,  HOLMIUM LASER LITHOTRIPSY, BILATERAL STENT PLACEMENT, STONE BASKETING;  Surgeon: Jose Hunter MD;  Location:  OR     LASER HOLMIUM LITHOTRIPSY URETER(S), INSERT STENT, COMBINED Left 8/14/2018    Procedure: COMBINED CYSTOSCOPY, URETEROSCOPY, LASER HOLMIUM LITHOTRIPSY URETER(S), INSERT STENT;  CYSTOSCOPY, LEFT URETEROSCOPY, LEFT LASER HOLMIUM LITHOTRIPSY URETER(S) REMOVAL BILATERAL STENTS;  Surgeon:  "Jose Hunter MD;  Location:  OR     ORTHOPEDIC SURGERY      (R) knee scope     ORTHOPEDIC SURGERY      total hip      ORTHOPEDIC SURGERY      elbow surgery       Social History     Tobacco Use     Smoking status: Never Smoker     Smokeless tobacco: Never Used   Substance Use Topics     Alcohol use: Yes     Frequency: 4 or more times a week     Drinks per session: 1 or 2     Binge frequency: Never     Comment: quit 10/2015; now abt 1-3 vodka's per night     Family History   Problem Relation Age of Onset     Lung Cancer Mother      Heart Failure Father          age 87         Current Outpatient Medications   Medication Sig Dispense Refill     apixaban ANTICOAGULANT (ELIQUIS) 2.5 MG tablet Take 1 tablet (2.5 mg) by mouth daily 90 tablet 3     gabapentin (NEURONTIN) 600 MG tablet TAKE 1 TABLET(600 MG) BY MOUTH TWICE DAILY 180 tablet 3     pantoprazole (PROTONIX) 40 MG EC tablet        spironolactone (ALDACTONE) 100 MG tablet Take 1 tablet (100 mg) by mouth daily 30 tablet 0     sucralfate (CARAFATE) 1 GM/10ML suspension TK 10ML PO QID BEFORE MEALS AND NIGHTLY       torsemide (DEMADEX) 20 MG tablet Take 2 tablets (40 mg) by mouth every morning 180 tablet 3     Allergies   Allergen Reactions     Blood Transfusion Related (Informational Only) Other (See Comments)     Patient has a history of a clinically significant antibody against RBC antigens.  A delay in compatible RBCs may occur.     Kdc:Minocycline+[place] Blue [facility]cf GI Disturbance     16-PT IS NOT AWARE OF THIS REACTION     Ciprofloxacin Itching     Severe itching \"like ants were crawling\"     Hmg-Coa-R Inhibitors Other (See Comments)     Rhabdomyolysis occurred within a couple days  Rhabdomyolysis       Reviewed and updated as needed this visit by Provider         Review of Systems   Constitutional, HEENT, cardiovascular, pulmonary, gi and gu systems are negative, except as otherwise noted.      Objective    /75 (BP Location: Right arm, " "Cuff Size: Adult Regular)   Pulse 73   Temp 97.5  F (36.4  C) (Temporal)   Ht 1.854 m (6' 1\")   Wt 102.1 kg (225 lb)   SpO2 94%   BMI 29.69 kg/m    Body mass index is 29.69 kg/m .  Physical Exam   General: This is an unchanged appearing man in no acute distress.  Neurological: Alert and oriented to person place and time, mild memory deficits are present, cranial nerves II to XII are grossly intact, there is symmetric strength in the bilateral upper extremities, there is normal strength in the right lower extremity muscle groups, there is normal strength in the left proximal lower extremity, there is a left leg amputation.  Sensation to light touch is symmetric in all dermatomes.  Deep tendon reflexes at the bilateral brachial radialis, triceps and biceps tendons are 2+ and symmetric.  There are diminished deep tendon reflexes in the right patellar and Achilles tendons.  There is no obvious cogwheel rigidity or resting tremor.  The patient is in a wheelchair.            Assessment & Plan       ICD-10-CM    1. Peripheral neuropathy, idiopathic  G60.9    2. Alcohol abuse  F10.10         I think that his imbalance and lack of sense of equilibrium is most likely multifactorial.  His head CT scan is stable.  I think that peripheral neuropathy may be playing a large role in his sense of imbalance.  It is true that his neuropathy is unlikely to resolve, but cutting back on alcohol would slow the progression and perhaps improved some pain symptoms from his neuropathy.  He remains unwilling to consider this.  He does not wish to speak about this further.  We discussed the pros and cons of going to a neurologist to get a second opinion or for further investigation into these matters.  The likelihood of a single unifying diagnosis to explain his imbalance and falls is very unlikely and if there is a underlying diagnosis, the likelihood of that being a treatable or reversible process is very low.  Having heard this, he is " not enthusiastic about going to see another doctor.  My recommendation to him was to use a walker at all times to mitigate his risk for falling and to use a wheelchair for long distances.  He tells me that he is already doing so.  We spent about 30 minutes in face-to-face contact and more than 50% of that time was spent counseling and coordinating care.            Return in about 2 months (around 9/2/2020) for recheck.    Main Hardy MD  [facility]

## 2020-07-24 NOTE — TELEPHONE ENCOUNTER
Pre-Procedure Unconfirmed COVID Test   Pt states he was not given information or told to have any covid testing. Will notify covid scheduling administration.     Step 1 COVID Screening  The patient was screened for COVID symptoms due to the inability to confirm the patient's COVID status (positive or negative test)    Patient reports the following:  Fever/Chills? No   Cough? No   Shortness of breath? No   New loss of taste or smell? No  Sore throat? No  Muscle or body aches? No  Headaches? No  Fatigue? No  Vomiting or diarrhea? No    Patient informed to contact the ordering provider if any of the symptoms develop prior to the procedure    Step 2 Screening Results (Skip if the patient is negative for symptoms)  If the patient is positive for new or worsening symptoms, contact the ordering provider to determine if the procedure is deemed necessary    Determine if the procedure can be re-scheduled when the patient is symptom free or has a negative COVID test     Step 3 Review Visitor Policy  Patient informed of the updated visitor policy   1 visitor allowed per patient   Visitor must screen negative for COVID symptoms   Visitor must wear a mask   Waiting rooms continue to be closed to visitors    Angus Velasco RN

## 2020-08-07 NOTE — TELEPHONE ENCOUNTER
Pre-Procedure Unconfirmed COVID Test     Step 1 COVID Screening  The patient was screened for COVID symptoms due to the inability to confirm the patient's COVID status (positive or negative test)    Patient reports the following:  Fever/Chills? No   Cough? No   Shortness of breath? No   New loss of taste or smell? No  Sore throat? No  Muscle or body aches? No  Headaches? No  Fatigue? No  Vomiting or diarrhea? No    Patient informed to contact the ordering provider if any of the symptoms develop prior to the procedure    Step 2 Screening Results (Skip if the patient is negative for symptoms)  If the patient is positive for new or worsening symptoms, contact the ordering provider to determine if the procedure is deemed necessary    Determine if the procedure can be re-scheduled when the patient is symptom free or has a negative COVID test     Step 3 Review Visitor Policy  Patient informed of the updated visitor policy   1 visitor allowed per patient   Visitor must screen negative for COVID symptoms   Visitor must wear a mask   Waiting rooms continue to be closed to visitors    Ronel Garcia RN

## 2020-08-07 NOTE — LETTER
August 10, 2020      Antonio Ramirez  [address]   [place] [address]        Dear ,    The following letter pertains to your most recent diagnostic tests:     Good news! This is a negative COVID test result.       Resulted Orders   Asymptomatic COVID-19 Virus (Coronavirus) by PCR   Result Value Ref Range    COVID-19 Virus PCR to [facility] - Source Nasopharyngeal     COVID-19 Virus PCR to [facility] - Result Not Detected       Comment:      Collection of multiple specimens from the same patient may be necessary to   detect the virus. The possibility of a false negative should be considered if   the patient's recent exposure or clinical presentation suggests 2019 nCOV   infection and diagnostic tests for other causes of illness are negative.   Repeat testing may be considered in this setting.  Viral RNA was extracted via a validated method and subsequently underwent   single step reverse transcriptase-real time polymerase chain reaction using   primers to the CDC specified N1,N2 gene targets of CoV2 and human RNP as an   internal control.  A negative result does not rule out the presence of real-time PCR inhibitors   in the specimen or COVID-19 RNA in concentrations below the limit of detection   of the assay. The possibility of a false negative should be considered if the   patients recent exposure or clinical presentation suggests COVID-19.   Additional testing or repeat testing requires consultation with the   laborator  y.  Nasopharyngeal specimen is the preferred choice for swab-based SARS CoV2   testing. When collection of a nasopharyngeal swab is not possible the   following are acceptable alternatives:  an oropharyngeal (OP) specimen collected by a healthcare professional, or a   nasal mid-turbinate (NMT) swab collected by a healthcare professional or by   onsite self-collection (using a flocked tapered swab), or an anterior nares   specimen collected by a healthcare professional or by onsite  self-collection   (using a round foam swab). (Centers for Disease Control)  Testing performed by [facility], Room 1[address]210, [address] [place] [address], [place] [address]. This test was developed and its   performance characteristics determined by the [facility]. It has not been cleared or approved by the FDA.  The laboratory is regulated under the Clinical Laboratory Improvement   Amendments of 1988 (CLIA-88) as qualified to perform high-complexity testin  g.   This test is used for clinical purposes. It should not be regarded as   investigational or for research.         If you have any questions or concerns, please call the clinic at the number listed above.     Sincerely,     Dr. Hardy

## 2020-08-09 NOTE — RESULT ENCOUNTER NOTE
The following letter pertains to your most recent diagnostic tests:    Good news! This is a negative COVID test result.       Sincerely,    Dr. Hardy

## 2020-08-10 NOTE — PROGRESS NOTES
Care Suites Procedure Nursing Note    Patient Information  Name: Antonio Ramirez  Age: 75 year old    Procedure  Procedure: Paracentesis  Procedure start time: 14:15  Procedure complete time: 14:30  Concerns/abnormal assessment: None  Pt tolerated well. VSS. 800 cc dalia fluid removed from abdomen w/o difficulty. Bandaid applied to site - CDI.       14:30 Pt back to Care Suites.

## 2020-08-10 NOTE — PROGRESS NOTES
Care [facility]es Discharge Nursing Note    Patient Information  Name: Antonio Ramirez  Age: 75 year old    Discharge Education:  Discharge instructions reviewed: Yes  Additional education/resources provided: n/a  Patient/patient representative verbalizes understanding: Yes  Patient discharging on new medications: No  Medication education completed: Yes    Discharge Plans:   Discharge location: home  Discharge ride contacted: Yes  Approximate discharge time: ~1515    Discharge Criteria:  Discharge criteria met and vital signs stable: Yes    Patient Belongs:  Patient belongings returned to patient: Yes    Ronel Garcia RN

## 2020-08-10 NOTE — PROGRESS NOTES
PATIENT/VISITOR WELLNESS SCREENING    Step 1 Patient Screening    1. In the last month, have you been in contact with someone who was confirmed or suspected to have Coronavirus/COVID-19? No    2. Do you have the following symptoms?  Fever/Chills? No   Cough? No   Shortness of breath? No   New loss of taste or smell? No  Sore throat? No  Muscle or body aches? No  Headaches? No  Fatigue? No  Vomiting or diarrhea? No    If the visitor has positive symptoms, notify supervisor/manger  Per policy, the visitor will need to leave the facility     Step 3 Refer to logic grid below for actions    NO SYMPTOM(S)    ACTIONS:  1. Standard rooming process  2. Provider to assess per normal protocol  3. Implement precautions as needed and per guidelines     POSITIVE SYMPTOM(S)  If positive for ANY of the following symptoms: fever, cough, shortness of breath, rash    ACTION:  1. Continue to have the patient wear a mask   2. Room patient as soon as possible  3. Don appropriate PPE when entering room  4. Provider evaluation    Care Suites Admission Nursing Note    Patient Information  Name: Antonio Ramirez  Age: 75 year old  Reason for admission: paracentesis  Care Suites arrival time: ~1245    Patient Admission/Assessment   Pre-procedure assessment complete: Yes  If abnormal assessment/labs, provider notified: N/A  NPO: N/A  Medications held per instructions/orders: Yes  Consent: deferred  Patient oriented to room: Yes  Education/questions answered: Yes  Plan/other: Procedure ~1400    Discharge Planning  Accompanied by: self  Discharge name/phone number: [phone]  Overnight post sedation caregiver: Helena  Discharge location: home    Ronel Garcia RN

## 2020-08-10 NOTE — DISCHARGE INSTRUCTIONS

## 2020-08-10 NOTE — PROGRESS NOTES
Care Suites Post Procedure Note    Patient Information  Name: Antonio Ramirez  Age: 75 year old    Post Procedure  Time patient returned to Care Suites: ~1445  Concerns/abnormal assessment: none  If abnormal assessment, provider notified: No  Plan/Other: Discharge to home ~1510    Ronel Garcia RN

## 2020-08-10 NOTE — ED AVS SNAPSHOT
Emergency Department  [address] [place] [id number]  Phone:  [phone]  Fax:  [phone]                                    Antonio Ramirez   MRN: [id number]    Department:   Emergency Department   Date of Visit:  8/10/2020           After Visit Summary Signature Page    I have received my discharge instructions, and my questions have been answered. I have discussed any challenges I see with this plan with the nurse or doctor.    ..........................................................................................................................................  Patient/Patient Representative Signature      ..........................................................................................................................................  Patient Representative Print Name and Relationship to Patient    ..................................................               ................................................  Date                                   Time    ..........................................................................................................................................  Reviewed by Signature/Title    ...................................................              ..............................................  Date                                               Time          22EPIC Rev 08/18

## 2020-08-11 NOTE — ED PROVIDER NOTES
History     Chief Complaint:  Wound Check    The history is provided by the patient.      Antonio Ramirez is an anticoagulated 75 year old male with a history of atrial fibrillation, alcoholism with ascites, CAD, CHF, CKD, hepatic cirrhosis, hypertension, and an MI, who presents to the emergency department with his wife for evaluation due to his left sided incision site from his paracentesis today continuing to bleed. The patient states that the paracentesis was performed about 10 hours prior to arrival. He is noted to be on Eliquis but has not taken it the last two days in preparation for the procedure.  Patient denies any abdominal pain.       Allergies:  Ciprofloxacin  Hmg-Coa-R inhibitors     Medications:    Eliquis  Gabapentin  Protonix  Aldactone  Demadex    Past Medical History:    Rectal bleeding  Peripheral neuropathy  Malignant neoplasm of prostate  PE  DVT  Hypertension  CKD  CHF  CAD  Aortic stenosis  Hepatic cirrhosis  Amputated left leg  Alcoholism   GERD  Arthritis  Atrial fibrillation  Ischemic cardiomyopathy  Anticardiolipin antibody syndrome  Anemia  MI  Hypercholesterolemia    Past Surgical History:    BTK amputation  Appendectomy  Hip arthroplasty  Knee arthroplasty  CABG  Cholecystectomy  Colonoscopy x2  Cystoscopy, retrogrades, exchange stent ureters, combined  Craniotomy  Endoscopic vein stripping  Enteroscopy small bowel  EGD, combined x4  Eye surgery  Prostate implants  Hernia repair  I&D of lower extremity x3  IVC filter placement  Visceral angiogram   Ureter stent insertion x2    Family History:    Lung cancer  CHF: Father    Social History:  Negative for tobacco use.  Positive for alcohol.  Negative for drug use.  Marital Status:       Review of Systems   Constitutional: Negative for fever.   Respiratory: Negative for shortness of breath.    Cardiovascular: Negative for chest pain.   Gastrointestinal: Negative for abdominal pain.   Skin: Positive for wound.   Hematological:  "Bruises/bleeds easily.   All other systems reviewed and are negative.      Physical Exam     Patient Vitals for the past 24 hrs:   BP Temp Temp src Pulse Resp SpO2 Height Weight   08/10/20 2350 116/66 -- -- 76 -- 92 % -- --   08/10/20 2219 125/76 98.6  F (37  C) Temporal 76 17 96 % 1.88 m (6' 2\") 102.1 kg (225 lb)       Physical Exam  General: Appears well-developed and well-nourished.   Head: No signs of trauma.   CV: Normal rate and regular rhythm.    Resp: Effort normal and breath sounds normal. No respiratory distress.   GI: Soft. There is no tenderness.  No rebound or guarding.  Normal bowel sounds.    MSK: Normal range of motion.   Neuro: The patient is alert and oriented. Speech normal.  Skin: Skin is warm and dry.  Slow oozing of blood from left abdominal wall puncture site.  Psych: normal mood and affect. behavior is normal.       Emergency Department Course   Procedures  Wound Repair        WOUND: 1mm Puncture site on the left side of abdomen from a paracentesis       LOCATION:  Left abdomen      ANESTHESIA:  Local using 1% Lidocaine with epi total of 2 mLs        CLOSURE:  Held pressure to the wound. Wound was then closed with Dermabond    Emergency Department Course:  Past medical records, nursing notes, and vitals reviewed.    0020 I performed an exam of the patient as documented above.     0030 I rechecked the patient and discussed the results of his workup thus far. I closed the patient's incision site, see procedure note above.     0044 I rechecked the patient and discussed the results of his workup thus far.     Findings and plan explained to the Patient and spouse. Patient discharged home with instructions regarding supportive care, medications, and reasons to return. The importance of close follow-up was reviewed.    I personally answered all related questions prior to discharge.     Impression & Plan   Medical Decision Making:  Antonio Ramirez is a 75 year old male who presents with bleeding from " the puncture site for paracentesis that was done today.  He is on Eliquis but had held it for a day prior.  On my evaluation there is slow bleeding from the puncture site from the left abdomen.  Remainder of his abdominal exam was benign. There is no sign of a hematoma.  I did inject lidocaine with epinephrine and held pressure which was able to stop the bleeding. I then applied tissue adhesive and a Band-Aid to reinforce the area.  Patient was discharged home with information for supportive care.    Diagnosis:    ICD-10-CM    1. Bleeding from wound  T14.8XXA        Disposition:  Discharged to home.    Scribe Disclosure:  I, Eve Jorge Ls, am serving as a scribe at 12:29 AM on 8/11/2020 to document services personally performed by Juarez Goldstein MD, based on my observations and the provider's statements to me.      Juarez Goldstein MD  08/11/20 0334       Juarez Goldstein MD  08/13/20 0964

## 2020-08-11 NOTE — PROGRESS NOTES
Clinic Care Coordination Contact  [facility]/Voicemail    Referral Source: ED Follow-Up  Clinical Data: Care Coordinator Outreach    Pt presented to the ED for evaluation due to his left sided incision site from his paracentesis continuing to bleed 10 hours after procedure. Skin adhesive was applied to stop bleeding.    Outreach attempted x 1.  Left message on patient's voicemail with call back information and requested return call.    Plan: Care Coordinator will try to reach patient again in 1-2 business days.    CRISTOBAL Pond, [facility]  Clinic Care Coordinator  [facility]  [phone]  [email or web address]

## 2020-08-11 NOTE — DISCHARGE INSTRUCTIONS
Skin adhesive was applied to stop your bleeding.  This will dissolve over the next few days on its own.  If bleeding reoccurs try holding pressure.  If it does not stop, return to the ER.

## 2020-08-11 NOTE — ED TRIAGE NOTES
Pt had a paracentesis today and is bleeding from the spot the needle went in. Pt is on a blood thinner.

## 2020-08-11 NOTE — TELEPHONE ENCOUNTER
"Patient calling, denies triage\" I'm just wondering if I should go in? I had a paracentesis  Done today and the site has not stopped bleeding since I left there.\" Patient states he's held his Eliquis for about 48 hours.   Advised ER.  Ynes Leonard RN [facility]    COVID 19 Nurse Triage Plan/Patient Instructions    Please be aware that novel coronavirus (COVID-19) may be circulating in the community. If you develop symptoms such as fever, cough, or SOB or if you have concerns about the presence of another infection including coronavirus (COVID-19), please contact your health care provider or visit [email or web address].     Disposition/Instructions    ED Visit recommended. Follow protocol based instructions.     Bring Your Own Device:  Please also bring your smart device(s) (smart phones, tablets, laptops) and their charging cables for your personal use and to communicate with your care team during your visit.    Thank you for taking steps to prevent the spread of this virus.  o Limit your contact with others.  o Wear a simple mask to cover your cough.  o Wash your hands well and often.    Resources    [facility]: About COVID-19: [email or web address]    CDC: What to Do If You're Sick: www.cdc.gov/coronavirus/2019-ncov/about/steps-when-sick.html    CDC: Ending Home Isolation: www.cdc.gov/coronavirus/2019-ncov/hcp/disposition-in-home-patients.html     CDC: Caring for Someone: www.cdc.gov/coronavirus/2019-ncov/if-you-are-sick/care-for-someone.html     [facility]: Interim Guidance for Hospital Discharge to Home: www.health.[facility].[place]/diseases/coronavirus/hcp/hospdischarge.pdf    [facility] clinical trials (COVID-19 research studies): clinicalaffairs.[facility]/n-clinical-trials     Below are the COVID-19 hotlines at the [place] Department of Health ([facility]). Interpreters are available.   o For health questions: Call 198-754-0143 or 1-213.176.5377 (7 a.m. to 7 p.m.)  o For questions about " schools and childcare: Call 873-835-0333 or 1-805.741.3985 (7 a.m. to 7 p.m.)

## 2020-08-18 NOTE — PROGRESS NOTES
Clinic Care Coordination Contact    Clinic Care Coordination Contact  OUTREACH    Referral Information:  Referral Source: ED Follow-Up    Primary Diagnosis: GI Disorders    Chief Complaint   Patient presents with     Clinic Care Coordination - Initial     Clinic Care Coordination - Post Hospital        Universal Utilization: pt has been in the hospital multiple times over the past 90 days for planned procedures  Clinic Utilization  Difficulty keeping appointments:: No  Compliance Concerns: No  No-Show Concerns: No  No PCP office visit in Past Year: No  Utilization    Last refreshed: 8/16/2020 11:55 PM:  Hospital Admissions 23           Last refreshed: 8/16/2020 11:55 PM:  ED Visits 5           Last refreshed: 8/16/2020 11:55 PM:  No Show Count (past year) 0              Current as of: 8/16/2020 11:55 PM            Clinical Concerns:  CC FAHEEM spoke with pt briefly regarding her recent ED visit. Pt stated that he is doing well in regard to why he went in last week. He is frustrated because he seems to have picked up a cold while in the ED. CC SW inquired about symptoms, which include runny nose, cough, and itchy eyes. He doesn't believe it is COVID. CC SW encouraged him to reach out to clinic if symptoms get worse or change to more closely align with COVID symptoms. Pt was agreeable.    Current Medical Concerns:  None     Current Behavioral Concerns: none    Education Provided to patient: CC role   Pain  Pain (GOAL):: No  Health Maintenance Reviewed: Up to date  Clinical Pathway: None    Medication Management:  No medication changes    Functional Status:  Dependent ADLs:: Ambulation-walker  Dependent IADLs:: Independent  Bed or wheelchair confined:: No  Mobility Status: Independent w/Device  Fallen 2 or more times in the past year?: No  Any fall with injury in the past year?: No    Living Situation:  Current living arrangement:: I live in a private home with spouse  Type of residence:: Apartment    Lifestyle &  Psychosocial Needs:  Lifestyle     Physical activity     Days per week: 0 days     Minutes per session: 0 min     Stress: Not at all     Social Needs     Financial resource strain: Not hard at all     Food insecurity     Worry: Never true     Inability: Never true     Transportation needs     Medical: No     Non-medical: No     Diet:: No added salt  Inadequate nutrition (GOAL):: No(appetite comes and goes)  Tube Feeding: No  Inadequate activity/exercise (GOAL):: No  Significant changes in sleep pattern (GOAL): No  Transportation means:: Regular car     Yarsanism or spiritual beliefs that impact treatment:: No  Mental health DX:: No  Mental health management concern (GOAL):: No  Informal Support system:: Spouse   Socioeconomic History     Marital status:      Spouse name: Not on file     Number of children: 2     Years of education: Not on file     Highest education level: Not on file   Occupational History     Occupation: Retired    Relationships     Social connections     Talks on phone: Not on file     Gets together: Not on file     Attends Synagogue service: Not on file     Active member of club or organization: Not on file     Attends meetings of clubs or organizations: Not on file     Relationship status:      Intimate partner violence     Fear of current or ex partner: No     Emotionally abused: No     Physically abused: No     Forced sexual activity: No     Tobacco Use     Smoking status: Never Smoker     Smokeless tobacco: Never Used   Substance and Sexual Activity     Alcohol use: Yes     Frequency: 4 or more times a week     Drinks per session: 1 or 2     Binge frequency: Never     Comment: quit 10/2015; now abt 1-3 vodka's per night     Drug use: No     Sexual activity: Not Currently     Partners: Female        Resources and Interventions:  Current Resources:   Community Resources: DME  Supplies used at home:: None  Equipment Currently Used at Home: wheelchair, manual, shower  chair, walker, rolling, grab bar, toilet, grab bar, tub/shower    Advance Care Plan/Directive  Advanced Care Plans/Directives on file:: In process(Has, just not on file)  Advanced Care Plan/Directive Status: In Process(Encouraged and informed patient to provide copy to care team.)    Referrals Placed: None     Patient/Caregiver understanding: Pt reports understanding and denies any additional questions or concerns at this times. SW CC engaged in AIDET communication during encounter.       Future Appointments              In 6 days SH BODY RAD; SH BODY RAD; SH IMAGING NURSE; [facility]    In 2 weeks Main Hardy MD [facility], [facility]    In 2 weeks 1, [address] [facility]    In 2 weeks Main Hardy MD [facility], [facility]    In 3 weeks SH BODY RAD; SH BODY RAD; SH IMAGING NURSE; [facility]        Plan: At this time, pt denies outstanding need for connection or referral to resources or assistance navigating recommended follow up care. No further outreaches will be made at this time unless a new referral is made or a change in the pt's status occurs. Patient was provided with CC SW contact information and encouraged to call with any questions or concerns.    CRISTOBAL Pond, [facility]  Clinic Care Coordinator  [facility]  [phone]  [email or web address]

## 2020-08-18 NOTE — LETTER
[place] CARE COORDINATION  [address] [place] [address]    August 18, 2020    Antonio Ramirez  [address]   [facility] [address]      Dear Antonio,    I am a clinic care coordinator who works with Main Hardy MD at [facility] - [phone]. I wanted to thank you for spending the time to talk with me.  Below is a description of clinic care coordination and how I can further assist you.      The clinic care coordination team is made up of a registered nurse,  and community health worker who understand the health care system. The goal of clinic care coordination is to help you manage your health and improve access to the health care system in the most efficient manner. The team can assist you in meeting your health care goals by providing education, coordinating services, strengthening the communication among your providers and supporting you with any resource needs.    Please feel free to contact me at [phone] with any questions or concerns. We are focused on providing you with the highest-quality healthcare experience possible and that all starts with you.     Sincerely,     CRISTOBAL Pond, [facility]  Clinic Care Coordinator  [facility]  [phone]  [email or web address]

## 2020-09-02 PROBLEM — R53.2 COMPLETE IMMOBILITY DUE TO SEVERE PHYSICAL DISABILITY OR FRAILTY (H): Status: ACTIVE | Noted: 2020-01-01

## 2020-09-02 PROBLEM — I26.99 PULMONARY EMBOLISM (H): Status: RESOLVED | Noted: 2019-10-28 | Resolved: 2020-01-01

## 2020-09-02 NOTE — PROGRESS NOTES
"Antonio Ramirez is a 75 year old male who is being evaluated via a billable video visit.      The patient has been notified of following:     \"This video visit will be conducted via a call between you and your physician/provider. We have found that certain health care needs can be provided without the need for an in-person physical exam.  This service lets us provide the care you need with a video conversation.  If a prescription is necessary we can send it directly to your pharmacy.  If lab work is needed we can place an order for that and you can then stop by our lab to have the test done at a later time.    Video visits are billed at different rates depending on your insurance coverage.  Please reach out to your insurance provider with any questions.    If during the course of the call the physician/provider feels a video visit is not appropriate, you will not be charged for this service.\"    Patient has given verbal consent for Video visit? Yes  How would you like to obtain your AVS? MyChart  If you are dropped from the video visit, the video invite should be resent to: Text to cell phone: [phone]  Will anyone else be joining your video visit? No    Subjective     Antonio Ramirez is a 75 year old male who presents today via video visit for the following health issues:    HPI    Annual Wellness Visit    Are you in the first 12 months of your Medicare Part B coverage?  No    Physical Health:    In general, how would you rate your overall physical health? fair    Outside of work, how many days during the week do you exercise?none    Outside of work, approximately how many minutes a day do you exercise?not applicable    If you drink alcohol do you typically have >3 drinks per day or >7 drinks per week? No    Do you usually eat at least 4 servings of fruit and vegetables a day, include whole grains & fiber and avoid regularly eating high fat or \"junk\" foods? Yes    Do you have any problems taking medications " regularly? No    Do you have any side effects from medications? none    Needs assistance for the following daily activities: transportation, shopping, preparing meals, housework and laundry    Which of the following safety concerns are present in your home?  none identified     Hearing impairment: No    In the past 6 months, have you been bothered by leaking of urine? no    There were no vitals taken for this visit.  Weight: Unable to obtain due to video visit  Height: Unable to obtain due to video visit  BMI: Unable to obtain due to video visit  Blood Pressure: Unable to obtain due to video visit    Mental Health:    In general, how would you rate your overall mental or emotional health? excellent  PHQ-2 Score: 0    Do you feel safe in your environment? Yes    Have you ever done Advance Care Planning? (For example, a Health Directive, POLST, or a discussion with a medical provider or your loved ones about your wishes)? Yes, patient states has an Advance Care Planning document and will bring a copy to the clinic.    Fall risk:  Fallen 2 or more times in the past year?: No  Any fall with injury in the past year?: No    Cognitive Screening: Unable to complete due to virtual visit; need for additional assessment in future face-to-face visit    Do you have sleep apnea, excessive snoring or daytime drowsiness?: yes    Current providers sharing in care for this patient include:   Patient Care Team:  Main Hardy MD as PCP - General (Internal Medicine)  Latasha Flower as Home Care Nurse  Main Hardy MD as Assigned PCP       Video Start Time: 12:42 PM    He is doing well without new complaints  Needs refill on aldactone  Needs re up on standing order for therapeutic paracenteses which have helped him manage severe ascites symptoms.  He continues to imbibe alcohol and is vague about the amount.  He does not plan to cut down or quite despite my repeated recommendations.     Review of Systems   Constitutional, HEENT,  "cardiovascular, pulmonary, gi and gu systems are negative, except as otherwise noted.      Objective           Vitals:  No vitals were obtained today due to virtual visit.    Physical Exam     GENERAL: Healthy, alert and no distress  EYES: Eyes grossly normal to inspection.  No discharge or erythema, or obvious scleral/conjunctival abnormalities.  RESP: No audible wheeze, cough, or visible cyanosis.  No visible retractions or increased work of breathing.    SKIN: Visible skin clear. No significant rash, abnormal pigmentation or lesions.  NEURO: Cranial nerves grossly intact.  Mentation and speech appropriate for age.  PSYCH: Mentation appears normal, affect normal/bright, judgement and insight intact, normal speech and appearance well-groomed.  Judgement remains impaired.               Assessment & Plan     Antonio was seen today for physical.    Diagnoses and all orders for this visit:    Alcoholic cirrhosis of liver with ascites (H)  -     spironolactone (ALDACTONE) 100 MG tablet; Take 1 tablet (100 mg) by mouth daily  -     US Paracentesis; Standing    Complete immobility due to severe physical disability or frailty (H)    Malignant lymphoplasmacytic lymphoma (H)    Left below-knee amputee (H)    Coronary artery disease involving native heart with angina pectoris, unspecified vessel or lesion type (H)    I think he is doing about as good as he can do.  He has dodged a great deal of bullets and continues to push his luck by drinking.  I again counseled him on the peril of this.  I think he understands, but he demonstrates no willingness to change.  He is generally content.  I re upped his orders for paracenteses and refilled his aldactone.  I think we can wait until spring for routine labs to keep him out of harms way during the pandemic and he agrees.        BMI:   Estimated body mass index is 28.89 kg/m  as calculated from the following:    Height as of 8/10/20: 1.88 m (6' 2\").    Weight as of 8/10/20: 102.1 kg " (225 lb).     I urged him to be as active as possible, he is limited by his amputation and gait problems           Return in about 6 months (around 3/2/2021) for recheck.  Patient instructed to return to clinic or contact us sooner if symptoms worsen or new symptoms develop.     Main Hardy MD  [facility]      Video-Visit Details    Type of service:  Video Visit    Video End Time:1:00 PM    Originating Location (pt. Location): Home    Distant Location (provider location):  [facility]     Platform used for Video Visit: [email or web address]

## 2020-09-04 NOTE — TELEPHONE ENCOUNTER
Pt states he has not gone in for Covid test. Informed pt his last test is almost a month old so he will need to have another test. States he does not have time to do it today. Transferred call to scheduling dept so pt can reschedule his procedure for after he has another Covid test

## 2020-09-10 NOTE — LETTER
September 11, 2020      Antonio Ramirez  [address]   [place] [address]        Hudson Cazares,    The following letter pertains to your most recent diagnostic tests:     Good news! This is a negative covid test result.         Sincerely,     Dr. Hardy     Resulted Orders   Asymptomatic COVID-19 Virus (Coronavirus) by PCR   Result Value Ref Range    COVID-19 Virus PCR to [facility] - Source Nasopharyngeal     COVID-19 Virus PCR to [facility] - Result Not Detected       Comment:      Collection of multiple specimens from the same patient may be necessary to   detect the virus. The possibility of a false negative should be considered if   the patient's recent exposure or clinical presentation suggests 2019 nCOV   infection and diagnostic tests for other causes of illness are negative.   Repeat testing may be considered in this setting.  Patient sample was heat inactivated and amplified using the HDPCR SARS-CoV-2   assay ([employer] The HDPCRTM SARS-CoV-2 assay is a reverse   transcription real-time polymerase chain reaction (qRT-PCR) test intended for   the qualitative detection of nucleic acid  from SARS-CoV-2 in human nasopharyngeal swabs, oropharyngeal swabs, anterior   nasal swabs, mid-turbinate nasal swabs as well as nasal aspirate, nasal wash,   and bronchoalveolar lavage (BAL) specimens from individuals who are suspected   of COVID-19 by their healthcare provider.  A negative result does not rule out the presence of real-time PCR inhibitors   in the sp  ecimen or COVID-19 RNA in concentrations below the limit of detection   of the assay. The possibility of a false negative should be considered if the   patients recent exposure or clinical presentation suggests COVID-19.   Additional testing or repeat testing requires consultation with the   laboratory.  Nasopharyngeal specimen is the preferred choice for swab-based SARS CoV2   testing. When collection of a nasopharyngeal swab is not possible the    following are acceptable alternatives:  an oropharyngeal (OP) specimen collected by a healthcare professional, or a   nasal mid-turbinate (NMT) swab collected by a healthcare professional or by   onsite self-collection (using a flocked tapered swab), or an anterior nares   specimen collected by a healthcare professional or by onsite self-collection   (using a round foam swab). (Centers for Disease Control)  Testing performed by [facility] ([facility]) [address] [facility] [address]  The test performance characteristics were determined by [facility]. It has not been   cleared or approved by the FDA.  The laboratory is regulated under the Clinical Laboratory Improvement   Amendments of 1988 (CLIA-88) as qualified to perform high-complexity testing.   This test is used for clinical purposes. It should not be regarded as   investigational or for research.

## 2020-09-14 NOTE — DISCHARGE INSTRUCTIONS

## 2020-09-14 NOTE — PROGRESS NOTES
Care Suites Admission Nursing Note    Patient Information  Name: Antonio Ramirez  Age: 75 year old  Reason for admission: Paracentesis  Care Suites arrival time: 11:35      Patient Admission/Assessment   Pre-procedure assessment complete: Yes  If abnormal assessment/labs, provider notified: N/A.  INR and Plts pending  NPO: N/A  Medications held per instructions/orders: Yes  Consent: deferred  Patient oriented to room: Yes  Education/questions answered: Yes  Plan/other: Paracentesis at 12:45    Discharge Planning  Discharge name: Wife will   Discharge location: home

## 2020-09-14 NOTE — PROCEDURES
[employer]    Procedure: Imaging Procedure Note    Date/Time: 9/14/2020 1:20 PM  Performed by: Margaret Turner MD  Authorized by: Margaret Turner MD     UNIVERSAL PROTOCOL   Site Marked: Yes  Prior Images Obtained and Reviewed:  Yes  Required items: Required blood products, implants, devices and special equipment available    Patient identity confirmed:  Verbally with patient  Patient was reevaluated immediately before administering moderate or deep sedation or anesthesia  Confirmation Checklist:  Patient's identity using two indicators, relevant allergies, procedure was appropriate and matched the consent or emergent situation and correct equipment/implants were available  Time out: Immediately prior to the procedure a time out was called    Universal Protocol: the Joint Commission Universal Protocol was followed    Preparation: Patient was prepped and draped in usual sterile fashion          SEDATION    Patient Sedated: No    See dictated procedure note for full details.  PROCEDURE   Patient Tolerance:  Patient tolerated the procedure well with no immediate complications  Describe Procedure: US-guided paracentesis  Length of time physician/provider present for 1:1 monitoring during sedation: 0

## 2020-09-14 NOTE — PROGRESS NOTES
Care Suites Discharge Summary    Discharge Criteria:   Discharge Criteria met per MD orders: Yes.   Vital signs stable.     Pt demonstrates ability to ambulate safely: Yes.  (See discharge questionnaire for additional information)    Discharge instructions & education:   Discharge instructions reviewed with patient. Patient verbalizes understanding.   Additional patient education provided:  paracentesis     Medications:   Patient will be discharging on new medications- No. Patient verbalizes reason for use, start date, and side effects NA.    Items returned to patient:   Home and hospital acquired medications returned to patient NA   Listed belongings gathered and returned to patient: Yes    Patient discharged to home with spouse.     Von Del Castillo, SHIMON

## 2020-09-14 NOTE — PROGRESS NOTES
PATIENT/VISITOR WELLNESS SCREENING    Step 1 Patient Screening    1. In the last month, have you been in contact with someone who was confirmed or suspected to have Coronavirus/COVID-19? No    2. Do you have the following symptoms?  Fever/Chills? No   Cough? No   Shortness of breath? No   New loss of taste or smell? No  Sore throat? No  Muscle or body aches? No  Headaches? No  Fatigue? No  Vomiting or diarrhea? No    Step 2 Visitor Screening    No visitor    Step 3 Refer to logic grid below for actions    NO SYMPTOM(S)    ACTIONS:  1. Standard rooming process  2. Provider to assess per normal protocol  3. Implement precautions as needed and per guidelines     POSITIVE SYMPTOM(S)  If positive for ANY of the following symptoms: fever, cough, shortness of breath, rash    ACTION:  1. Continue to have the patient wear a mask   2. Room patient as soon as possible  3. Don appropriate PPE when entering room  4. Provider evaluation

## 2020-09-14 NOTE — PROGRESS NOTES
Care Suites Post Procedure Summary (without sedation)     Immediately prior to starting the procedure a Time Out was conducted with procedural staff and re-confirmed the patient s name, procedure, and site/side.      Consent obtained from patient after discussing the risks, benefits and alternatives.      Procedure: paracentesis    Procedure Interventions:    Fluid (cc) removed: Yes. Removed 2400 ml of red colored fluid from left abdomen. No bleeding or drainage at completion.    Tube/Drain placed: NA.    Patient tolerance: Patient tolerated the procedure well with no immediate complications.  Site dressed by tech with band aid. Denies pain.      (See Doc Flow-sheets and MAR for additional information)

## 2020-10-22 NOTE — PLAN OF CARE
"Problem: Infection, Risk/Actual (Adult)  Goal: Identify Related Risk Factors and Signs and Symptoms  Related risk factors and signs and symptoms are identified upon initiation of Human Response Clinical Practice Guideline (CPG).   Outcome: No Change   05/22/18 1343   Infection, Risk/Actual   Related Risk Factors (Infection, Risk/Actual) chronic illness/condition;exposure to microbes   Signs and Symptoms (Infection, Risk/Actual) body temperature changes;diaphoresis;lab value changes;malaise;cultures positive;weakness       Problem: Patient Care Overview  Goal: Plan of Care/Patient Progress Review  Outcome: No Change   05/22/18 1343   OTHER   Plan Of Care Reviewed With patient   Plan of Care Review   Progress no change     Dx: UTI  Hx: CKD, A-Fib, CHF, Alcohol Cirrhosis, HTN, CAD s/p CABG  Labs: BC +, Creatinine 1.69  Tele: NA  Assessment: Soft BP's, bolus given, otherwise all other VSS. A/Ox4. Denies SOB/CP/Pain. PIV infusing banana bag @ 100 mL/hr & NS @ 100 mL/hr. SBA. Left BKA. CIWA: 5. Lethargic throughout shift. Bladder scanned for 124 mL, MD notified and IV fluids changed total mL's an hour to 200 mL. Wife confessed to pt drinking, \"Lot's of Vodka on Saturday, and a beer on Sunday.\"  Plan: Continue POC. D/C home in approximately 2 days pending culture and sensitivity, afebrile, and plan for Abx. CT once bolus complete. Recheck Creatinine.        "
"Problem: Patient Care Overview  Goal: Plan of Care/Patient Progress Review  Discharge Planner PT   Patient plan for discharge: Patient's goal is to go home When asked if he would be receptive to ARC or TCU he replied \"maybe\".   Current status: Patient seen for initial eval and treatment initiated. Patient lives in an apartment with his wife. He has a left BKA for which he wears a prosthesis and typically does not use an AD. Patient was recently hospitalized for a brain bleed for which he had surgery. He went to rehab and has been home about 4 weeks. Wife and patient report he was independent including some driving just prior to admission but patient not receptive to questions so not completely sure what/if he was getting help with anything. Currently, patient is very weak and struggled to get to the edge of the bed and eventually accepted mod assist. Attempted to don prosthesis but unable due to swelling so donned stump . Patient attempting to don himself but due to peripheral neuropathy in his hands, having a lot of difficulty. Patient not very receptive to help. Patient is limited by significant generalized weakness, swelling in left LE limiting ability to don prosthesis, weakness in incoordination bilateral hands and impulsive.Patient refused OT earlier today. Patient would benefit from OT to address ADL's especially due to neuropathy in bilateral hands.    Barriers to return to prior living situation: Amount of assist needed, extreme weakness, impulsive, unable to don prosthesis  Recommendations for discharge: ARU  Rationale for recommendations: Patient would benefit from continued skilled PT to address the above prior to discharge to home. Patient would benefit from intense skilled PT but currently isn't very receptive or cooperative. If participation and receptiveness to therapies doesn't improve then recommend TCU.        Entered by: Maria Luisa Sharma 05/24/2018 3:32 PM         "
Problem: Infection, Risk/Actual (Adult)  Goal: Identify Related Risk Factors and Signs and Symptoms  Related risk factors and signs and symptoms are identified upon initiation of Human Response Clinical Practice Guideline (CPG).   Outcome: No Change   05/23/18 1430   Infection, Risk/Actual   Related Risk Factors (Infection, Risk/Actual) chronic illness/condition;exposure to microbes;surgery/procedure   Signs and Symptoms (Infection, Risk/Actual) malaise;cultures positive;lab value changes       Problem: Patient Care Overview  Goal: Plan of Care/Patient Progress Review  Outcome: No Change   05/23/18 1430   OTHER   Plan Of Care Reviewed With patient   Plan of Care Review   Progress no change     Dx: UTI - Severe Sepsis  Hx: CKD, A-Fib, CHF, Alcohol Cirrhosis, HTN, CAD s/p CAGB, and Traumatic subdural hematoma.  Labs: Creatinine: 1.75  Tele: NA  Assessment: Soft BP's, Bolus administered. A/Ox4. Denies SOB/CP/Pain. Assist of 2 w/ gait belt/walker. L BKA w/ Prosthetic @ bedside. Ambulated to bathroom. R Neph Output: 100 mL bloody, L Neph Output: 75 mL concentrated. CIWA: 3 & 1. Medium BM.   Plan: Continue POC. Possible antegrade stent placement tomorrow w/ lithotripsy in 2 wks. D/C 2-3 days after further evaluation and treatment.         
Problem: Infection, Risk/Actual (Adult)  Goal: Identify Related Risk Factors and Signs and Symptoms  Related risk factors and signs and symptoms are identified upon initiation of Human Response Clinical Practice Guideline (CPG).   Outcome: No Change   05/24/18 1438   Infection, Risk/Actual   Related Risk Factors (Infection, Risk/Actual) chronic illness/condition;exposure to microbes;surgery/procedure   Signs and Symptoms (Infection, Risk/Actual) edema;lab value changes;malaise;cultures positive;weakness       Problem: Patient Care Overview  Goal: Plan of Care/Patient Progress Review  Outcome: No Change   05/24/18 1438   OTHER   Plan Of Care Reviewed With patient   Plan of Care Review   Progress no change     Dx: Anemia   Hx: CKD, A-Fib, CHF, Alcohol Cirrhosis, HTN, CAD s/p CABG, and Traumatic Subdural Hematoma.  Labs: Creatinine: 1.57  Tele: NA  Assessment: VSS. A/Ox4, flat affect. Denies SOB/CP/Pain. PIV infusing. Heavy Assist of 2 w/ gait belt and walker. IR placed Bilateral Ureteral Stents, and Bilateral Neph tubes replaced. Large BM. CIWA: 2, and 3. Prosthetic @ bedside, not fitting d/t edema of stump.   Plan: Continue POC. D/C possibly tomorrow after continuation of Abx.          
Problem: Infection, Risk/Actual (Adult)  Goal: Identify Related Risk Factors and Signs and Symptoms  Related risk factors and signs and symptoms are identified upon initiation of Human Response Clinical Practice Guideline (CPG).   Outcome: No Change  A & O x 4, VSS except tmax 102.3 axillary, did c/o of chills and rigors, gave prn tylenol w/good relief. Pt is daily drinker, and not on CIWA, later in shift pt became nauseous, restless, tremor, and actively hallucinating, now on CIWA, scored 13, gave 5 mg IV valium. LS clear. Scabs on scalp intact, L BKA, prosthetic at bedside. Using urinal at bedside. 2 gr Na diet. Plan to continue w/IVF and ABX and d/c once sepsis resolved.    0705: CIWA rechk was 5 d/t diaphoresus, no valium needed at this time.  
Problem: Infection, Risk/Actual (Adult)  Goal: Identify Related Risk Factors and Signs and Symptoms  Related risk factors and signs and symptoms are identified upon initiation of Human Response Clinical Practice Guideline (CPG).  Outcome: No Change  Oriented x4.  VSS.  IV Rocephin antibiotics and IV fluids.  Denied pain.  Left BKA, prosthesis removed per pt.  Nursing will continue to monitor.      
Problem: Patient Care Overview  Goal: Discharge Needs Assessment  Outcome: Improving  VSS, afebrile, c/o bladder spasm/discomfort, taking pyridium and oxy for stent pain also. A/O, not OOB today, calls for needs, likely here until neph tube removal tuesday      
Problem: Patient Care Overview  Goal: Plan of Care/Patient Progress Review   VSS.  CIWA 0.  Bilateral neph tubes patent, flushed per orders.  Adequate output from L PNT, minimal from R.  Pt c/o discomfort d/t UTI, pyridium effective.  D/C to TCU, pending placement.  Continue to monitor.      
Problem: Patient Care Overview  Goal: Plan of Care/Patient Progress Review  0334-4929  BP soft, other VSS.  Neph tubes patent, output in L > R.  Sites WDL, dressings changed.  2-3+ pitting edema in LUE and LLE.  Patient refused to get out of bed, despite encouragement.  Possible PNT removal on Tuesday.  Continue to monitor.       
Problem: Patient Care Overview  Goal: Plan of Care/Patient Progress Review  A&Ox4. Agitated at times, but cooperative. Pelvic CT showing stones and hydronephrosis- bilateral neph tubes placed this evening at 2000, small amount of bloody drainage. VSS on RA, BP's improving. C/o 5/10 pain, PRN tylenol x1. Regular diet, tolerating well. NS infusing at 200/hr. CIWA scores 2 & 3. Voided 150cc dark dalia urine with PVR of 34 prior to neph tube placement. Urology consulted. Pt's daughter and wife updated. Will continue to monitor.       
Problem: Patient Care Overview  Goal: Plan of Care/Patient Progress Review  Discharge Planner OT   Patient plan for discharge: TCU  Current status: Following chart review and consult with staff, OT eval deferred to next level of care, pt is to discharge to TCU. IP needs are met with PT at this time, orders completed.   Barriers to return to prior living situation: See PT note  Recommendations for discharge: See PT note/ TCU  Rationale for recommendations: IP needs are met with PT at this time, pt is to discharge to TCU, will defer OT eval to next level of care.        Entered by: Marlena Tee 05/26/2018 10:12 AM           
Problem: Patient Care Overview  Goal: Plan of Care/Patient Progress Review  Discharge Planner PT   Patient plan for discharge: rehab  Current status: Pt requires Pepper for bed mobility, sit<>stand transfers with FWW and LLE prosthesis donned. Pt needs Pepper to tosha prosthesis. Able to stand 9 mins with CGA for balance. Pt declined gait or getting up to a chair.   Barriers to return to prior living situation: level of assist required, weakness  Recommendations for discharge: TCU  Rationale for recommendations: Pt will benefit from continued PT at TCU to progress mobility.        Entered by: Zari De Leon 05/25/2018 2:10 PM           
Problem: Patient Care Overview  Goal: Plan of Care/Patient Progress Review  OT and PT: Met with patient this PM however he declines OOB, declines both PT and OT today. Reporting he'll have a procedure tomorrow and wants to wait until after that. Also reports he's been up to the bathroom with nursing.       
Problem: Patient Care Overview  Goal: Plan of Care/Patient Progress Review  OT: Orders received, eval attempted. Pt very agitated, stating he has no skilled OT needs. Pt states he currently works with PT outside of hospital setting. Educated on orders for OT/PT, refusing OT and hesitant to participate with PT. Nursing aware. OT to hold order at this time and await PT screen.       
Problem: Patient Care Overview  Goal: Plan of Care/Patient Progress Review  OT: Pt with MD and other providers at this time discussing POC and d/c. Will reschedule.       
Problem: Patient Care Overview  Goal: Plan of Care/Patient Progress Review  OT: Re-attempted OT artemio pt meeting with care coordinator, RN aware of attempts.       
Problem: Patient Care Overview  Goal: Plan of Care/Patient Progress Review  Outcome: Adequate for Discharge Date Met: 05/27/18  PT discharged today from unit at 1730 escorted by [facility] transport via [facility].  All personal belongings sent with patient.  Coreg held d/t soft BP and bradycardia. No c/o pain.  Nephrostomy drains flushed with 10cc NS each prior to discharge.  Dressing CDI.  Pt denied any further questions or concerns.      
Problem: Patient Care Overview  Goal: Plan of Care/Patient Progress Review  Outcome: Improving  A&Ox4, generally calm and cooperative. CIWA score=0. VSS on RA. C/o feeling of urinary urgency, given pyridium x1, effective. Urine output occurring from neph tubes only. Neph tubes flushed, patent but R tube beginning to decrease in output, dressings c/d/i. Left BKA with some edema, unable to wear prostetic. 2g sodium diet, good appetite. Plan to DC to TCU when bed available.        
Problem: Patient Care Overview  Goal: Plan of Care/Patient Progress Review  Outcome: Improving  AOx4, sometimes agitated and forgetful. VSS on RA, last temp was 100, but started new antibiotic after. Assist x2/walker/gb. Left BKA with some edema. 2g sodium diet. CIWA scores 1 and 2. Denies pain, but c/o feeling of urinary urgency. Neph tubes are patent and draining, c/d/i. Bladder scan <50ml. Continue to monitor.       
Problem: Patient Care Overview  Goal: Plan of Care/Patient Progress Review  Outcome: No Change   A & O x 4, affect flat. VSS, c/o pain, gave prn oxycodone w/good relief. Bilateral neph tubes patent, irrigated per order and output lighter, pale red to clear yellow. C/o of feeling urinary urgency, bladder scanned for 10 mL. CIWA scores 3, 2 no prn needed. Plan for PCN out 5/29 and ABX for 2 weeks.      
Problem: Patient Care Overview  Goal: Plan of Care/Patient Progress Review  Outcome: No Change  A& O x 4, VSS except tmax 99.8. No c/o pain. Has been NPO since midnight. LS clear. +BS and passing flatus. Bilateral neph tubes patent, irrigated per order; plan for removal and stent placement today at 0900.             
Problem: Patient Care Overview  Goal: Plan of Care/Patient Progress Review  Outcome: No Change  A&Ox4, VSS but BP soft. C/o pain, gave prn oxycodone w/minimal relief. Bilateral neph tubes in place w/maroon output; L dressing w/small sanguinous drainage, R dressing CDI. CIWA score 2 and 4, no prn needed . + BS and passing gas. Urology consulted.        
Problem: Patient Care Overview  Goal: Plan of Care/Patient Progress Review  Outcome: No Change  AOx4, agitated/hostile at times. VSS on RA, no temps this shift. C/o feeling of urinary urgency, given pyridium x1. Neph tubes flushed, patent, dressings c/d/i. Left BKA with some edema, unable to wear prostetic. 2g sodium diet, good appetite. CIWA score=2. Plan to DC to ARF or TCU when bed available.       
Problem: Patient Care Overview  Goal: Plan of Care/Patient Progress Review  Outcome: No Change  Patient is A&Ox4.  VSS on RA. Urine output occurring from neph tubes only. Neph tubes flushed, dressings C/D/I. Given pyridium and oxycodone x1 for pain. Left BKA with some edema, improving. 2g sodium diet, good appetite. Plan: DC to [facility] TCU in [place] @6182.  Possibly neph tube removal Tuesday 5/29.      
Problem: Patient Care Overview  Goal: Plan of Care/Patient Progress Review  Outcome: No Change  Patient is A&Ox4.  VSS on RA. Urine output occurring from neph tubes only. Neph tubes flushed, patent but R tube beginning to decrease in output, dressings C/D/I. Left BKA with some edema, unable to wear prostetic. 2g sodium diet, good appetite. Plan to DC to TCU when bed available.  Possibly neph tube removal Tuesday. Will continue to observe.      
Problem: Patient Care Overview  Goal: Plan of Care/Patient Progress Review  Outcome: No Change  Pt A&Ox4.  VSS, except soft BPs.  Denies pain at this time.  Up with A2 and GB/walker.  Prosthetic leg at bedside.  Bilateral neph tubes patent.  R PIV infusing NS at 150mL/hr.  Lung sounds clear.  Bowel sounds active.  2g Na+ diet.  Will be NPO at 0000.  Nursing to continue to monitor.      
Problem: Patient Care Overview  Goal: Plan of Care/Patient Progress Review  Outcome: No Change  VSS. Oxycodone administered for pain.  CIWA score: 0, no prn medication needed.  L BKA, prosthetic at bedside, but not fitting d/t edema, scabs on head from hematoma, bruises.   2 gr NA.  +BS. Bilateral Neph tubes patent, irrigated per order, output in adequate amounts.  R PIV is WDL, infusing.  Paged that UC results are available to MD.  A2 w/gait belt and walker.  Plan of care: discharge to TCU when availability confirmed.            
Problem: Patient Care Overview  Goal: Plan of Care/Patient Progress Review  Physical Therapy Discharge Summary    Reason for therapy discharge:    Discharged to transitional care facility.    Progress towards therapy goal(s). See goals on Care Plan in [facility] electronic health record for goal details.  Goals partially met.  Barriers to achieving goals:   discharge from facility.    Therapy recommendation(s):    Continued therapy is recommended.  Rationale/Recommendations:  PT at TCU to progress mobility.        
18

## 2020-12-03 NOTE — LETTER
December 4, 2020      Antonio Ramirez  [address]   [place] [address]        Dear ,    We are writing to inform you of your test results.    The following letter pertains to your most recent diagnostic tests:     Good news! This is a negative COVID test result.       Resulted Orders   Asymptomatic COVID-19 Virus (Coronavirus) by PCR   Result Value Ref Range    COVID-19 Virus PCR to [facility] - Source Nasopharyngeal     COVID-19 Virus PCR to [facility] - Result Not Detected       Comment:      Collection of multiple specimens from the same patient may be necessary to   detect the virus. The possibility of a false negative should be considered if   the patient's recent exposure or clinical presentation suggests 2019 nCOV   infection and diagnostic tests for other causes of illness are negative.   Repeat testing may be considered in this setting.  Patient sample was heat inactivated and amplified using the HDPCR SARS-CoV-2   assay ([employer] The HDPCRTM SARS-CoV-2 assay is a reverse   transcription real-time polymerase chain reaction (qRT-PCR) test intended for   the qualitative detection of nucleic acid  from SARS-CoV-2 in human nasopharyngeal swabs, oropharyngeal swabs, anterior   nasal swabs, mid-turbinate nasal swabs as well as nasal aspirate, nasal wash,   and bronchoalveolar lavage (BAL) specimens from individuals who are suspected   of COVID-19 by their healthcare provider.  A negative result does not rule out the presence of real-time PCR inhibitors   in the sp ecimen or COVID-19 RNA in concentrations below the limit of detection   of the assay. The possibility of a false negative should be considered if the   patients recent exposure or clinical presentation suggests COVID-19.   Additional testing or repeat testing requires consultation with the   laboratory.  Nasopharyngeal specimen is the preferred choice for swab-based SARS CoV2   testing. When collection of a nasopharyngeal swab  is not possible the   following are acceptable alternatives:  an oropharyngeal (OP) specimen collected by a healthcare professional, or a   nasal mid-turbinate (NMT) swab collected by a healthcare professional or by   onsite self-collection (using a flocked tapered swab), or an anterior nares   specimen collected by a healthcare professional or by onsite self-collection   (using a round foam swab). (Centers for Disease Control)  Testing performed by [facility] ([facility]) [address] [place] [address]  The test performance characteristics were determined by [facility]. It has not been   cleared or approved by the FDA.  The laboratory is regulated under the Clinical Laboratory Improvement   Amendments of 1988 (CLIA-88) as qualified to perform high-complexity testing.   This test is used for clinical purposes. It should not be regarded as   investigational or for research.         If you have any questions or concerns, please call the clinic at the number listed above.       Sincerely,      Main Hardy MD/ jeovany barlow

## 2020-12-04 NOTE — TELEPHONE ENCOUNTER
Pre-Procedure Negative COVID Test Results    Results Reviewed  The patient has a negative COVID test result within the required timeframe for the scheduled procedure.     No COVID pre-call needed.     Rekha Garcia RN

## 2020-12-07 NOTE — PROGRESS NOTES
Care Suites Post Procedure Note    Patient Information  Name: Antonio Ramirez  Age: 75 year old    Post Procedure  Time patient returned to Care Suites: 1505    Concerns/abnormal assessment: Skin glue and bandaid dry.  If abnormal assessment, provider notified: N/A  Plan/Other: Taking fluids. Will discharge per orders.    Lelo Genao RN

## 2020-12-07 NOTE — PROGRESS NOTES
PATIENT/VISITOR WELLNESS SCREENING    Step 1 Patient Screening    1. In the last month, have you been in contact with someone who was confirmed or suspected to have Coronavirus/COVID-19? No  12/3 Negative Covid screen     2. Do you have the following symptoms?  Fever/Chills? No   Cough? No   Shortness of breath? No   New loss of taste or smell? No  Sore throat? No  Muscle or body aches? No  Headaches? No  Fatigue? No  Vomiting or diarrhea? No    Step 3 Refer to logic grid below for actions    NO SYMPTOM(S)    ACTIONS:  1. Standard rooming process  2. Provider to assess per normal protocol  3. Implement precautions as needed and per guidelines     POSITIVE SYMPTOM(S)  If positive for ANY of the following symptoms: fever, cough, shortness of breath, rash    ACTION:  1. Continue to have the patient wear a mask   2. Room patient as soon as possible  3. Don appropriate PPE when entering room  4. Provider evaluation

## 2020-12-07 NOTE — PROGRESS NOTES
...Care Suites Procedure Nursing Note    Patient Information  Name: Antonio Ramirez  Age: 75 year old    Procedure  Procedure: paracentesis  Procedure start time: 1440  Procedure complete time: 1500  Concerns/abnormal assessment: 1300 ml reddish dalia fluid removed from peritoneal cavity via left abdominal puncture site. Skin glue used for closure.  If abnormal assessment, provider notified: N/A  Plan/Other: Return to Care Suites, stabilize and discharge.    Lelo Genao RN

## 2020-12-07 NOTE — PROGRESS NOTES
Care Suites Admission Nursing Note    Patient Information  Name: Antonio Ramirez  Age: 75 year old  Reason for admission: Paracentesis - increasing Shortness of Breathe   Increasing weakness - noted LLE prosthesis   Care Suites arrival time: 1230    Patient Admission/Assessment   Pre-procedure assessment complete: Yes  If abnormal assessment/labs, provider notified:  INR     Plts   Pt. Declines weight   NPO: Yes   Medications held per instructions/orders: Not held, patient has chosen to not take diuretics   Consent: deferred  Patient oriented to room: Yes  Education/questions answered: Yes  Plan/other: Paracentesis     Discharge Planning  Discharge name/phone number: [place]  Wife  [phone]  Overnight post sedation caregiver: wife  Discharge location: home    Kelsie Lara RN

## 2020-12-07 NOTE — PROGRESS NOTES
Care Suites Discharge Nursing Note    Patient Information  Name: Antonio Ramirez  Age: 75 year old    Discharge Education:  Discharge instructions reviewed: Yes  Additional education/resources provided: NA  Patient/patient representative verbalizes understanding: Yes  Patient discharging on new medications: No  Medication education completed: N/A    Discharge Plans:   Discharge location: home  Discharge ride contacted: Yes  Approximate discharge time: 1525    Discharge Criteria:  Discharge criteria met and vital signs stable: Yes    Patient Belongs:  Patient belongings returned to patient: Yes    Lelo Genao RN

## 2021-01-01 ENCOUNTER — APPOINTMENT (OUTPATIENT)
Dept: CARDIOLOGY | Facility: CLINIC | Age: 76
DRG: 981 | End: 2021-01-01
Attending: INTERNAL MEDICINE
Payer: MEDICARE

## 2021-01-01 ENCOUNTER — TELEPHONE (OUTPATIENT)
Dept: UROLOGY | Facility: CLINIC | Age: 76
End: 2021-01-01

## 2021-01-01 ENCOUNTER — ANESTHESIA EVENT (OUTPATIENT)
Dept: MEDSURG UNIT | Facility: CLINIC | Age: 76
DRG: 981 | End: 2021-01-01
Payer: MEDICARE

## 2021-01-01 ENCOUNTER — HOSPITAL ENCOUNTER (OUTPATIENT)
Dept: CT IMAGING | Facility: CLINIC | Age: 76
Discharge: HOME | End: 2021-03-10
Attending: INTERNAL MEDICINE | Admitting: INTERNAL MEDICINE
Payer: MEDICARE

## 2021-01-01 ENCOUNTER — TELEPHONE (OUTPATIENT)
Dept: FAMILY MEDICINE | Facility: CLINIC | Age: 76
End: 2021-01-01

## 2021-01-01 ENCOUNTER — HOSPITAL ENCOUNTER (INPATIENT)
Facility: CLINIC | Age: 76
LOS: 3 days | Discharge: HOME | DRG: 378 | End: 2021-03-19
Attending: EMERGENCY MEDICINE | Admitting: INTERNAL MEDICINE
Payer: MEDICARE

## 2021-01-01 ENCOUNTER — APPOINTMENT (OUTPATIENT)
Dept: ULTRASOUND IMAGING | Facility: CLINIC | Age: 76
DRG: 981 | End: 2021-01-01
Attending: INTERNAL MEDICINE
Payer: MEDICARE

## 2021-01-01 ENCOUNTER — APPOINTMENT (OUTPATIENT)
Dept: GENERAL RADIOLOGY | Facility: CLINIC | Age: 76
DRG: 378 | End: 2021-01-01
Attending: EMERGENCY MEDICINE
Payer: MEDICARE

## 2021-01-01 ENCOUNTER — ANESTHESIA (OUTPATIENT)
Dept: MEDSURG UNIT | Facility: CLINIC | Age: 76
DRG: 981 | End: 2021-01-01
Payer: MEDICARE

## 2021-01-01 ENCOUNTER — OFFICE VISIT (OUTPATIENT)
Dept: FAMILY MEDICINE | Facility: CLINIC | Age: 76
End: 2021-01-01
Payer: MEDICARE

## 2021-01-01 ENCOUNTER — APPOINTMENT (OUTPATIENT)
Dept: GENERAL RADIOLOGY | Facility: CLINIC | Age: 76
DRG: 981 | End: 2021-01-01
Attending: NURSE PRACTITIONER
Payer: MEDICARE

## 2021-01-01 ENCOUNTER — HOSPITAL ENCOUNTER (INPATIENT)
Facility: CLINIC | Age: 76
LOS: 3 days | DRG: 981 | End: 2021-04-04
Attending: EMERGENCY MEDICINE | Admitting: INTERNAL MEDICINE
Payer: MEDICARE

## 2021-01-01 ENCOUNTER — HEALTH MAINTENANCE LETTER (OUTPATIENT)
Age: 76
End: 2021-01-01

## 2021-01-01 VITALS
OXYGEN SATURATION: 94 % | RESPIRATION RATE: 16 BRPM | HEART RATE: 61 BPM | DIASTOLIC BLOOD PRESSURE: 59 MMHG | WEIGHT: 198.4 LBS | SYSTOLIC BLOOD PRESSURE: 121 MMHG | TEMPERATURE: 97.4 F | BODY MASS INDEX: 26.29 KG/M2 | HEIGHT: 73 IN

## 2021-01-01 VITALS
BODY MASS INDEX: 29.85 KG/M2 | OXYGEN SATURATION: 93 % | DIASTOLIC BLOOD PRESSURE: 66 MMHG | WEIGHT: 225.2 LBS | TEMPERATURE: 97.6 F | RESPIRATION RATE: 9 BRPM | SYSTOLIC BLOOD PRESSURE: 73 MMHG | HEIGHT: 73 IN | HEART RATE: 42 BPM

## 2021-01-01 VITALS
TEMPERATURE: 97.7 F | OXYGEN SATURATION: 96 % | HEART RATE: 74 BPM | DIASTOLIC BLOOD PRESSURE: 87 MMHG | SYSTOLIC BLOOD PRESSURE: 146 MMHG

## 2021-01-01 DIAGNOSIS — R31.9 HEMATURIA, UNSPECIFIED TYPE: Primary | ICD-10-CM

## 2021-01-01 DIAGNOSIS — N39.0 URINARY TRACT INFECTION WITH HEMATURIA, SITE UNSPECIFIED: ICD-10-CM

## 2021-01-01 DIAGNOSIS — E87.5 HYPERKALEMIA: ICD-10-CM

## 2021-01-01 DIAGNOSIS — R94.31 PROLONGED QT INTERVAL: ICD-10-CM

## 2021-01-01 DIAGNOSIS — F10.21 ALCOHOL DEPENDENCE IN REMISSION (H): ICD-10-CM

## 2021-01-01 DIAGNOSIS — K62.5 RECTAL BLEEDING: ICD-10-CM

## 2021-01-01 DIAGNOSIS — R31.0 GROSS HEMATURIA: Primary | ICD-10-CM

## 2021-01-01 DIAGNOSIS — R31.9 URINARY TRACT INFECTION WITH HEMATURIA, SITE UNSPECIFIED: ICD-10-CM

## 2021-01-01 DIAGNOSIS — K70.31 ALCOHOLIC CIRRHOSIS OF LIVER WITH ASCITES (H): Primary | ICD-10-CM

## 2021-01-01 DIAGNOSIS — M72.0 DUPUYTREN CONTRACTURE: ICD-10-CM

## 2021-01-01 DIAGNOSIS — N17.9 ACUTE RENAL FAILURE, UNSPECIFIED ACUTE RENAL FAILURE TYPE (H): ICD-10-CM

## 2021-01-01 DIAGNOSIS — K70.31 ALCOHOLIC CIRRHOSIS OF LIVER WITH ASCITES (H): ICD-10-CM

## 2021-01-01 DIAGNOSIS — R31.0 GROSS HEMATURIA: ICD-10-CM

## 2021-01-01 DIAGNOSIS — K92.1 GASTROINTESTINAL HEMORRHAGE WITH MELENA: ICD-10-CM

## 2021-01-01 DIAGNOSIS — R82.90 NONSPECIFIC FINDING ON EXAMINATION OF URINE: ICD-10-CM

## 2021-01-01 DIAGNOSIS — R00.1 BRADYCARDIA: Primary | ICD-10-CM

## 2021-01-01 DIAGNOSIS — D68.61 ANTIPHOSPHOLIPID ANTIBODY SYNDROME (H): ICD-10-CM

## 2021-01-01 LAB
ABO + RH BLD: NORMAL
ALBUMIN SERPL-MCNC: 2.8 G/DL (ref 3.4–5)
ALBUMIN SERPL-MCNC: 3.1 G/DL (ref 3.4–5)
ALBUMIN SERPL-MCNC: 3.2 G/DL (ref 3.4–5)
ALBUMIN SERPL-MCNC: 3.3 G/DL (ref 3.4–5)
ALBUMIN SERPL-MCNC: 4.6 G/DL (ref 3.4–5)
ALBUMIN UR-MCNC: 10 MG/DL
ALBUMIN UR-MCNC: 100 MG/DL
ALBUMIN UR-MCNC: 30 MG/DL
ALP SERPL-CCNC: 64 U/L (ref 40–150)
ALP SERPL-CCNC: 86 U/L (ref 40–150)
ALP SERPL-CCNC: 89 U/L (ref 40–150)
ALP SERPL-CCNC: 93 U/L (ref 40–150)
ALP SERPL-CCNC: 97 U/L (ref 40–150)
ALT SERPL W P-5'-P-CCNC: 15 U/L (ref 0–70)
ALT SERPL W P-5'-P-CCNC: 18 U/L (ref 0–70)
ALT SERPL W P-5'-P-CCNC: 18 U/L (ref 0–70)
ALT SERPL W P-5'-P-CCNC: 20 U/L (ref 0–70)
ALT SERPL W P-5'-P-CCNC: 21 U/L (ref 0–70)
AMORPH CRY #/AREA URNS HPF: ABNORMAL /HPF
ANION GAP SERPL CALCULATED.3IONS-SCNC: 10 MMOL/L (ref 3–14)
ANION GAP SERPL CALCULATED.3IONS-SCNC: 6 MMOL/L (ref 3–14)
ANION GAP SERPL CALCULATED.3IONS-SCNC: 7 MMOL/L (ref 3–14)
ANION GAP SERPL CALCULATED.3IONS-SCNC: 7 MMOL/L (ref 3–14)
ANION GAP SERPL CALCULATED.3IONS-SCNC: 8 MMOL/L (ref 3–14)
ANION GAP SERPL CALCULATED.3IONS-SCNC: 9 MMOL/L (ref 3–14)
ANION GAP SERPL CALCULATED.3IONS-SCNC: 9 MMOL/L (ref 3–14)
APPEARANCE UR: ABNORMAL
APPEARANCE UR: CLEAR
APPEARANCE UR: CLEAR
AST SERPL W P-5'-P-CCNC: 23 U/L (ref 0–45)
AST SERPL W P-5'-P-CCNC: 32 U/L (ref 0–45)
AST SERPL W P-5'-P-CCNC: 36 U/L (ref 0–45)
BACTERIA #/AREA URNS HPF: ABNORMAL /HPF
BACTERIA SPEC CULT: NO GROWTH
BACTERIA SPEC CULT: NORMAL
BASE EXCESS BLDV CALC-SCNC: 1.5 MMOL/L
BASOPHILS # BLD AUTO: 0 10E9/L (ref 0–0.2)
BASOPHILS # BLD AUTO: 0 10E9/L (ref 0–0.2)
BASOPHILS # BLD AUTO: 0.1 10E9/L (ref 0–0.2)
BASOPHILS NFR BLD AUTO: 0.7 %
BASOPHILS NFR BLD AUTO: 0.9 %
BASOPHILS NFR BLD AUTO: 1.4 %
BILIRUB DIRECT SERPL-MCNC: 0.5 MG/DL (ref 0–0.2)
BILIRUB SERPL-MCNC: 0.6 MG/DL (ref 0.2–1.3)
BILIRUB SERPL-MCNC: 1 MG/DL (ref 0.2–1.3)
BILIRUB SERPL-MCNC: 1 MG/DL (ref 0.2–1.3)
BILIRUB SERPL-MCNC: 1.1 MG/DL (ref 0.2–1.3)
BILIRUB SERPL-MCNC: 1.5 MG/DL (ref 0.2–1.3)
BILIRUB UR QL STRIP: NEGATIVE
BLD GP AB SCN SERPL QL: NORMAL
BLD GP AB SCN SERPL QL: NORMAL
BLOOD BANK CMNT PATIENT-IMP: NORMAL
BLOOD BANK CMNT PATIENT-IMP: NORMAL
BUN SERPL-MCNC: 41 MG/DL (ref 7–30)
BUN SERPL-MCNC: 44 MG/DL (ref 7–30)
BUN SERPL-MCNC: 78 MG/DL (ref 7–30)
BUN SERPL-MCNC: 80 MG/DL (ref 7–30)
BUN SERPL-MCNC: 81 MG/DL (ref 7–30)
BUN SERPL-MCNC: 83 MG/DL (ref 7–30)
BUN SERPL-MCNC: 83 MG/DL (ref 7–30)
CALCIUM SERPL-MCNC: 7.8 MG/DL (ref 8.5–10.1)
CALCIUM SERPL-MCNC: 8.2 MG/DL (ref 8.5–10.1)
CALCIUM SERPL-MCNC: 8.5 MG/DL (ref 8.5–10.1)
CALCIUM SERPL-MCNC: 8.7 MG/DL (ref 8.5–10.1)
CALCIUM SERPL-MCNC: 8.8 MG/DL (ref 8.5–10.1)
CALCIUM SERPL-MCNC: 9 MG/DL (ref 8.5–10.1)
CALCIUM SERPL-MCNC: 9.6 MG/DL (ref 8.5–10.1)
CHLORIDE SERPL-SCNC: 108 MMOL/L (ref 94–109)
CHLORIDE SERPL-SCNC: 110 MMOL/L (ref 94–109)
CHLORIDE SERPL-SCNC: 94 MMOL/L (ref 94–109)
CHLORIDE SERPL-SCNC: 95 MMOL/L (ref 94–109)
CHLORIDE SERPL-SCNC: 97 MMOL/L (ref 94–109)
CHLORIDE SERPL-SCNC: 97 MMOL/L (ref 94–109)
CHLORIDE SERPL-SCNC: 98 MMOL/L (ref 94–109)
CK SERPL-CCNC: 45 U/L (ref 30–300)
CO2 BLDCOV-SCNC: 22 MMOL/L (ref 21–28)
CO2 SERPL-SCNC: 20 MMOL/L (ref 20–32)
CO2 SERPL-SCNC: 21 MMOL/L (ref 20–32)
CO2 SERPL-SCNC: 22 MMOL/L (ref 20–32)
CO2 SERPL-SCNC: 26 MMOL/L (ref 20–32)
CO2 SERPL-SCNC: 26 MMOL/L (ref 20–32)
CO2 SERPL-SCNC: 27 MMOL/L (ref 20–32)
CO2 SERPL-SCNC: 28 MMOL/L (ref 20–32)
COLONOSCOPY: NORMAL
COLOR UR AUTO: ABNORMAL
COLOR UR AUTO: YELLOW
COLOR UR AUTO: YELLOW
COPATH REPORT: NORMAL
CREAT BLD-MCNC: 1.3 MG/DL (ref 0.66–1.25)
CREAT SERPL-MCNC: 1.09 MG/DL (ref 0.66–1.25)
CREAT SERPL-MCNC: 1.2 MG/DL (ref 0.66–1.25)
CREAT SERPL-MCNC: 6.14 MG/DL (ref 0.66–1.25)
CREAT SERPL-MCNC: 6.17 MG/DL (ref 0.66–1.25)
CREAT SERPL-MCNC: 6.29 MG/DL (ref 0.66–1.25)
CREAT SERPL-MCNC: 6.65 MG/DL (ref 0.66–1.25)
CREAT SERPL-MCNC: 6.83 MG/DL (ref 0.66–1.25)
CREAT UR-MCNC: 164 MG/DL
DEPRECATED CALCIDIOL+CALCIFEROL SERPL-MC: 19 UG/L (ref 20–75)
DIFFERENTIAL METHOD BLD: ABNORMAL
EOSINOPHIL # BLD AUTO: 0 10E9/L (ref 0–0.7)
EOSINOPHIL # BLD AUTO: 0.1 10E9/L (ref 0–0.7)
EOSINOPHIL # BLD AUTO: 0.3 10E9/L (ref 0–0.7)
EOSINOPHIL NFR BLD AUTO: 0.9 %
EOSINOPHIL NFR BLD AUTO: 2.2 %
EOSINOPHIL NFR BLD AUTO: 5.3 %
ERYTHROCYTE [DISTWIDTH] IN BLOOD BY AUTOMATED COUNT: 15 % (ref 10–15)
ERYTHROCYTE [DISTWIDTH] IN BLOOD BY AUTOMATED COUNT: 15.2 % (ref 10–15)
ERYTHROCYTE [DISTWIDTH] IN BLOOD BY AUTOMATED COUNT: 15.2 % (ref 10–15)
ERYTHROCYTE [DISTWIDTH] IN BLOOD BY AUTOMATED COUNT: 15.3 % (ref 10–15)
ERYTHROCYTE [DISTWIDTH] IN BLOOD BY AUTOMATED COUNT: 15.3 % (ref 10–15)
ERYTHROCYTE [DISTWIDTH] IN BLOOD BY AUTOMATED COUNT: 15.4 % (ref 10–15)
ETHANOL SERPL-MCNC: 0.08 G/DL
ETHANOL SERPL-MCNC: <0.01 G/DL
FERRITIN SERPL-MCNC: 84 NG/ML (ref 26–388)
FIBRINOGEN PPP-MCNC: 134 MG/DL (ref 200–420)
GFR SERPL CREATININE-BSD FRML MDRD: 54 ML/MIN/{1.73_M2}
GFR SERPL CREATININE-BSD FRML MDRD: 58 ML/MIN/{1.73_M2}
GFR SERPL CREATININE-BSD FRML MDRD: 65 ML/MIN/{1.73_M2}
GFR SERPL CREATININE-BSD FRML MDRD: 7 ML/MIN/{1.73_M2}
GFR SERPL CREATININE-BSD FRML MDRD: 7 ML/MIN/{1.73_M2}
GFR SERPL CREATININE-BSD FRML MDRD: 8 ML/MIN/{1.73_M2}
GLUCOSE BLDC GLUCOMTR-MCNC: 131 MG/DL (ref 70–99)
GLUCOSE BLDC GLUCOMTR-MCNC: 145 MG/DL (ref 70–99)
GLUCOSE BLDC GLUCOMTR-MCNC: 151 MG/DL (ref 70–99)
GLUCOSE BLDC GLUCOMTR-MCNC: 155 MG/DL (ref 70–99)
GLUCOSE BLDC GLUCOMTR-MCNC: 157 MG/DL (ref 70–99)
GLUCOSE BLDC GLUCOMTR-MCNC: 165 MG/DL (ref 70–99)
GLUCOSE BLDC GLUCOMTR-MCNC: 189 MG/DL (ref 70–99)
GLUCOSE BLDC GLUCOMTR-MCNC: 28 MG/DL (ref 70–99)
GLUCOSE BLDC GLUCOMTR-MCNC: 29 MG/DL (ref 70–99)
GLUCOSE BLDC GLUCOMTR-MCNC: 63 MG/DL (ref 70–99)
GLUCOSE BLDC GLUCOMTR-MCNC: 73 MG/DL (ref 70–99)
GLUCOSE BLDC GLUCOMTR-MCNC: 80 MG/DL (ref 70–99)
GLUCOSE BLDC GLUCOMTR-MCNC: 82 MG/DL (ref 70–99)
GLUCOSE BLDC GLUCOMTR-MCNC: 88 MG/DL (ref 70–99)
GLUCOSE BLDC GLUCOMTR-MCNC: 96 MG/DL (ref 70–99)
GLUCOSE SERPL-MCNC: 110 MG/DL (ref 70–99)
GLUCOSE SERPL-MCNC: 144 MG/DL (ref 70–99)
GLUCOSE SERPL-MCNC: 29 MG/DL (ref 70–99)
GLUCOSE SERPL-MCNC: 59 MG/DL (ref 70–99)
GLUCOSE SERPL-MCNC: 70 MG/DL (ref 70–99)
GLUCOSE SERPL-MCNC: 84 MG/DL (ref 70–99)
GLUCOSE SERPL-MCNC: 86 MG/DL (ref 70–99)
GLUCOSE UR STRIP-MCNC: NEGATIVE MG/DL
HCO3 BLDV-SCNC: 28 MMOL/L (ref 21–28)
HCT VFR BLD AUTO: 24.9 % (ref 40–53)
HCT VFR BLD AUTO: 25.1 % (ref 40–53)
HCT VFR BLD AUTO: 28.1 % (ref 40–53)
HCT VFR BLD AUTO: 28.9 % (ref 40–53)
HCT VFR BLD AUTO: 29.4 % (ref 40–53)
HCT VFR BLD AUTO: 30 % (ref 40–53)
HCT VFR BLD CALC: 27 %PCV (ref 40–53)
HEMOCCULT STL QL: POSITIVE
HGB BLD CALC-MCNC: 9.2 G/DL (ref 13.3–17.7)
HGB BLD-MCNC: 10.3 G/DL (ref 13.3–17.7)
HGB BLD-MCNC: 8 G/DL (ref 13.3–17.7)
HGB BLD-MCNC: 8.2 G/DL (ref 13.3–17.7)
HGB BLD-MCNC: 8.3 G/DL (ref 13.3–17.7)
HGB BLD-MCNC: 8.5 G/DL (ref 13.3–17.7)
HGB BLD-MCNC: 8.8 G/DL (ref 13.3–17.7)
HGB BLD-MCNC: 9.1 G/DL (ref 13.3–17.7)
HGB BLD-MCNC: 9.2 G/DL (ref 13.3–17.7)
HGB BLD-MCNC: 9.2 G/DL (ref 13.3–17.7)
HGB BLD-MCNC: 9.3 G/DL (ref 13.3–17.7)
HGB BLD-MCNC: 9.4 G/DL (ref 13.3–17.7)
HGB BLD-MCNC: 9.4 G/DL (ref 13.3–17.7)
HGB BLD-MCNC: 9.5 G/DL (ref 13.3–17.7)
HGB UR QL STRIP: ABNORMAL
HYALINE CASTS #/AREA URNS LPF: 1 /LPF (ref 0–2)
HYALINE CASTS #/AREA URNS LPF: 13 /LPF (ref 0–2)
IMM GRANULOCYTES # BLD: 0 10E9/L (ref 0–0.4)
IMM GRANULOCYTES NFR BLD: 0.2 %
IMM GRANULOCYTES NFR BLD: 0.4 %
IMM GRANULOCYTES NFR BLD: 0.4 %
INR PPP: 1.56 (ref 0.86–1.14)
INR PPP: 1.97 (ref 0.86–1.14)
INTERPRETATION ECG - MUSE: NORMAL
INTERPRETATION ECG - MUSE: NORMAL
IRON SATN MFR SERPL: 5 % (ref 15–46)
IRON SERPL-MCNC: 12 UG/DL (ref 35–180)
KETONES UR STRIP-MCNC: 5 MG/DL
KETONES UR STRIP-MCNC: NEGATIVE MG/DL
KETONES UR STRIP-MCNC: NEGATIVE MG/DL
LABORATORY COMMENT REPORT: NORMAL
LABORATORY COMMENT REPORT: NORMAL
LEUKOCYTE ESTERASE UR QL STRIP: ABNORMAL
LEUKOCYTE ESTERASE UR QL STRIP: ABNORMAL
LEUKOCYTE ESTERASE UR QL STRIP: NEGATIVE
LIPASE SERPL-CCNC: 1294 U/L (ref 73–393)
LYMPHOCYTES # BLD AUTO: 0.3 10E9/L (ref 0.8–5.3)
LYMPHOCYTES # BLD AUTO: 0.3 10E9/L (ref 0.8–5.3)
LYMPHOCYTES # BLD AUTO: 0.4 10E9/L (ref 0.8–5.3)
LYMPHOCYTES NFR BLD AUTO: 5.8 %
LYMPHOCYTES NFR BLD AUTO: 7 %
LYMPHOCYTES NFR BLD AUTO: 8.9 %
Lab: NORMAL
Lab: NORMAL
MAGNESIUM SERPL-MCNC: 1.7 MG/DL (ref 1.6–2.3)
MAGNESIUM SERPL-MCNC: 2.7 MG/DL (ref 1.6–2.3)
MCH RBC QN AUTO: 32 PG (ref 26.5–33)
MCH RBC QN AUTO: 32 PG (ref 26.5–33)
MCH RBC QN AUTO: 32.8 PG (ref 26.5–33)
MCH RBC QN AUTO: 32.9 PG (ref 26.5–33)
MCH RBC QN AUTO: 33.1 PG (ref 26.5–33)
MCH RBC QN AUTO: 33.8 PG (ref 26.5–33)
MCHC RBC AUTO-ENTMCNC: 31.6 G/DL (ref 31.5–36.5)
MCHC RBC AUTO-ENTMCNC: 31.9 G/DL (ref 31.5–36.5)
MCHC RBC AUTO-ENTMCNC: 32.5 G/DL (ref 31.5–36.5)
MCHC RBC AUTO-ENTMCNC: 32.9 G/DL (ref 31.5–36.5)
MCHC RBC AUTO-ENTMCNC: 33.1 G/DL (ref 31.5–36.5)
MCHC RBC AUTO-ENTMCNC: 34.3 G/DL (ref 31.5–36.5)
MCV RBC AUTO: 100 FL (ref 78–100)
MCV RBC AUTO: 100 FL (ref 78–100)
MCV RBC AUTO: 101 FL (ref 78–100)
MCV RBC AUTO: 103 FL (ref 78–100)
MCV RBC AUTO: 98 FL (ref 78–100)
MCV RBC AUTO: 98 FL (ref 78–100)
MICROALBUMIN UR-MCNC: 164 MG/L
MICROALBUMIN/CREAT UR: 100 MG/G CR (ref 0–17)
MONOCYTES # BLD AUTO: 0.5 10E9/L (ref 0–1.3)
MONOCYTES # BLD AUTO: 0.5 10E9/L (ref 0–1.3)
MONOCYTES # BLD AUTO: 0.6 10E9/L (ref 0–1.3)
MONOCYTES NFR BLD AUTO: 10.1 %
MONOCYTES NFR BLD AUTO: 10.7 %
MONOCYTES NFR BLD AUTO: 12 %
MUCOUS THREADS #/AREA URNS LPF: PRESENT /LPF
NEUTROPHILS # BLD AUTO: 3.4 10E9/L (ref 1.6–8.3)
NEUTROPHILS # BLD AUTO: 3.7 10E9/L (ref 1.6–8.3)
NEUTROPHILS # BLD AUTO: 4.3 10E9/L (ref 1.6–8.3)
NEUTROPHILS NFR BLD AUTO: 75.8 %
NEUTROPHILS NFR BLD AUTO: 77.1 %
NEUTROPHILS NFR BLD AUTO: 80.2 %
NITRATE UR QL: NEGATIVE
NON-SQ EPI CELLS #/AREA URNS LPF: ABNORMAL /LPF
NRBC # BLD AUTO: 0 10*3/UL
NRBC BLD AUTO-RTO: 0 /100
NRBC BLD AUTO-RTO: 0 /100
NRBC BLD AUTO-RTO: 1 /100
OXYHGB MFR BLDV: 56 %
PCO2 BLDV: 39 MM HG (ref 40–50)
PCO2 BLDV: 53 MM HG (ref 40–50)
PH BLDV: 7.33 PH (ref 7.32–7.43)
PH BLDV: 7.35 PH (ref 7.32–7.43)
PH UR STRIP: 5.5 PH (ref 5–7)
PH UR STRIP: 6.5 PH (ref 5–7)
PH UR STRIP: 7 PH (ref 5–7)
PHOSPHATE SERPL-MCNC: 4.6 MG/DL (ref 2.5–4.5)
PHOSPHATE SERPL-MCNC: 4.7 MG/DL (ref 2.5–4.5)
PLATELET # BLD AUTO: 103 10E9/L (ref 150–450)
PLATELET # BLD AUTO: 104 10E9/L (ref 150–450)
PLATELET # BLD AUTO: 158 10E9/L (ref 150–450)
PLATELET # BLD AUTO: 162 10E9/L (ref 150–450)
PLATELET # BLD AUTO: 85 10E9/L (ref 150–450)
PLATELET # BLD AUTO: 94 10E9/L (ref 150–450)
PO2 BLDV: 19 MM HG (ref 25–47)
PO2 BLDV: 33 MM HG (ref 25–47)
POTASSIUM BLD-SCNC: 5.6 MMOL/L (ref 3.4–5.3)
POTASSIUM SERPL-SCNC: 4.3 MMOL/L (ref 3.4–5.3)
POTASSIUM SERPL-SCNC: 4.5 MMOL/L (ref 3.4–5.3)
POTASSIUM SERPL-SCNC: 4.6 MMOL/L (ref 3.4–5.3)
POTASSIUM SERPL-SCNC: 5.1 MMOL/L (ref 3.4–5.3)
POTASSIUM SERPL-SCNC: 5.4 MMOL/L (ref 3.4–5.3)
POTASSIUM SERPL-SCNC: 5.8 MMOL/L (ref 3.4–5.3)
POTASSIUM SERPL-SCNC: 6 MMOL/L (ref 3.4–5.3)
POTASSIUM SERPL-SCNC: 6.1 MMOL/L (ref 3.4–5.3)
POTASSIUM SERPL-SCNC: 6.2 MMOL/L (ref 3.4–5.3)
PROT SERPL-MCNC: 5.9 G/DL (ref 6.8–8.8)
PROT SERPL-MCNC: 6.6 G/DL (ref 6.8–8.8)
PROT SERPL-MCNC: 6.9 G/DL (ref 6.8–8.8)
PROT SERPL-MCNC: 7.1 G/DL (ref 6.8–8.8)
PROT SERPL-MCNC: 7.3 G/DL (ref 6.8–8.8)
PTH-INTACT SERPL-MCNC: 345 PG/ML (ref 12–64)
RBC # BLD AUTO: 2.44 10E12/L (ref 4.4–5.9)
RBC # BLD AUTO: 2.49 10E12/L (ref 4.4–5.9)
RBC # BLD AUTO: 2.81 10E12/L (ref 4.4–5.9)
RBC # BLD AUTO: 2.91 10E12/L (ref 4.4–5.9)
RBC # BLD AUTO: 2.94 10E12/L (ref 4.4–5.9)
RBC # BLD AUTO: 3.05 10E12/L (ref 4.4–5.9)
RBC #/AREA URNS AUTO: 138 /HPF (ref 0–2)
RBC #/AREA URNS AUTO: 5 /HPF (ref 0–2)
RBC #/AREA URNS AUTO: >100 /HPF
SAO2 % BLDV FROM PO2: 26 %
SARS-COV-2 RNA RESP QL NAA+PROBE: NEGATIVE
SARS-COV-2 RNA RESP QL NAA+PROBE: NEGATIVE
SODIUM BLD-SCNC: 128 MMOL/L (ref 133–144)
SODIUM SERPL-SCNC: 127 MMOL/L (ref 133–144)
SODIUM SERPL-SCNC: 127 MMOL/L (ref 133–144)
SODIUM SERPL-SCNC: 129 MMOL/L (ref 133–144)
SODIUM SERPL-SCNC: 129 MMOL/L (ref 133–144)
SODIUM SERPL-SCNC: 130 MMOL/L (ref 133–144)
SODIUM SERPL-SCNC: 141 MMOL/L (ref 133–144)
SODIUM SERPL-SCNC: 142 MMOL/L (ref 133–144)
SODIUM UR-SCNC: <5 MMOL/L
SOURCE: ABNORMAL
SP GR UR STRIP: 1.02 (ref 1–1.03)
SPECIMEN EXP DATE BLD: NORMAL
SPECIMEN EXP DATE BLD: NORMAL
SPECIMEN SOURCE: NORMAL
SQUAMOUS #/AREA URNS AUTO: 5 /HPF (ref 0–1)
SQUAMOUS #/AREA URNS AUTO: <1 /HPF (ref 0–1)
TIBC SERPL-MCNC: 221 UG/DL (ref 240–430)
UPPER GI ENDOSCOPY: NORMAL
URATE CRY #/AREA URNS HPF: ABNORMAL /HPF
UROBILINOGEN UR STRIP-ACNC: 1 EU/DL (ref 0.2–1)
UROBILINOGEN UR STRIP-MCNC: 0 MG/DL (ref 0–2)
UROBILINOGEN UR STRIP-MCNC: 0 MG/DL (ref 0–2)
WBC # BLD AUTO: 3.9 10E9/L (ref 4–11)
WBC # BLD AUTO: 4.4 10E9/L (ref 4–11)
WBC # BLD AUTO: 4.5 10E9/L (ref 4–11)
WBC # BLD AUTO: 4.9 10E9/L (ref 4–11)
WBC # BLD AUTO: 5.4 10E9/L (ref 4–11)
WBC # BLD AUTO: 6.5 10E9/L (ref 4–11)
WBC #/AREA URNS AUTO: 1 /HPF (ref 0–5)
WBC #/AREA URNS AUTO: 42 /HPF (ref 0–5)
WBC #/AREA URNS AUTO: >100 /HPF

## 2021-01-01 PROCEDURE — 94660 CPAP INITIATION&MGMT: CPT

## 2021-01-01 PROCEDURE — C9113 INJ PANTOPRAZOLE SODIUM, VIA: HCPCS | Performed by: INTERNAL MEDICINE

## 2021-01-01 PROCEDURE — 86901 BLOOD TYPING SEROLOGIC RH(D): CPT | Performed by: EMERGENCY MEDICINE

## 2021-01-01 PROCEDURE — 83970 ASSAY OF PARATHORMONE: CPT | Performed by: INTERNAL MEDICINE

## 2021-01-01 PROCEDURE — C9803 HOPD COVID-19 SPEC COLLECT: HCPCS

## 2021-01-01 PROCEDURE — 93306 TTE W/DOPPLER COMPLETE: CPT

## 2021-01-01 PROCEDURE — 250N000011 HC RX IP 250 OP 636: Performed by: INTERNAL MEDICINE

## 2021-01-01 PROCEDURE — 85018 HEMOGLOBIN: CPT | Performed by: INTERNAL MEDICINE

## 2021-01-01 PROCEDURE — 99233 SBSQ HOSP IP/OBS HIGH 50: CPT | Performed by: INTERNAL MEDICINE

## 2021-01-01 PROCEDURE — 84132 ASSAY OF SERUM POTASSIUM: CPT | Performed by: INTERNAL MEDICINE

## 2021-01-01 PROCEDURE — 87086 URINE CULTURE/COLONY COUNT: CPT | Performed by: INTERNAL MEDICINE

## 2021-01-01 PROCEDURE — 999N001017 HC STATISTIC GLUCOSE BY METER IP

## 2021-01-01 PROCEDURE — 36415 COLL VENOUS BLD VENIPUNCTURE: CPT | Performed by: INTERNAL MEDICINE

## 2021-01-01 PROCEDURE — 02WA3MZ REVISION OF CARDIAC LEAD IN HEART, PERCUTANEOUS APPROACH: ICD-10-PCS | Performed by: INTERNAL MEDICINE

## 2021-01-01 PROCEDURE — 87635 SARS-COV-2 COVID-19 AMP PRB: CPT | Performed by: EMERGENCY MEDICINE

## 2021-01-01 PROCEDURE — 96365 THER/PROPH/DIAG IV INF INIT: CPT

## 2021-01-01 PROCEDURE — 82077 ASSAY SPEC XCP UR&BREATH IA: CPT | Performed by: EMERGENCY MEDICINE

## 2021-01-01 PROCEDURE — 93005 ELECTROCARDIOGRAM TRACING: CPT

## 2021-01-01 PROCEDURE — 120N000001 HC R&B MED SURG/OB

## 2021-01-01 PROCEDURE — 82728 ASSAY OF FERRITIN: CPT | Performed by: INTERNAL MEDICINE

## 2021-01-01 PROCEDURE — 83735 ASSAY OF MAGNESIUM: CPT | Performed by: EMERGENCY MEDICINE

## 2021-01-01 PROCEDURE — C1730 CATH, EP, 19 OR FEW ELECT: HCPCS | Performed by: INTERNAL MEDICINE

## 2021-01-01 PROCEDURE — 85613 RUSSELL VIPER VENOM DILUTED: CPT | Performed by: INTERNAL MEDICINE

## 2021-01-01 PROCEDURE — 83550 IRON BINDING TEST: CPT | Performed by: INTERNAL MEDICINE

## 2021-01-01 PROCEDURE — 85025 COMPLETE CBC W/AUTO DIFF WBC: CPT | Performed by: EMERGENCY MEDICINE

## 2021-01-01 PROCEDURE — 85027 COMPLETE CBC AUTOMATED: CPT | Performed by: NURSE PRACTITIONER

## 2021-01-01 PROCEDURE — 250N000009 HC RX 250: Performed by: EMERGENCY MEDICINE

## 2021-01-01 PROCEDURE — 250N000009 HC RX 250: Performed by: INTERNAL MEDICINE

## 2021-01-01 PROCEDURE — 99223 1ST HOSP IP/OBS HIGH 75: CPT | Mod: 25 | Performed by: INTERNAL MEDICINE

## 2021-01-01 PROCEDURE — 80053 COMPREHEN METABOLIC PANEL: CPT | Performed by: EMERGENCY MEDICINE

## 2021-01-01 PROCEDURE — 250N000011 HC RX IP 250 OP 636: Performed by: EMERGENCY MEDICINE

## 2021-01-01 PROCEDURE — 99239 HOSP IP/OBS DSCHRG MGMT >30: CPT | Performed by: HOSPITALIST

## 2021-01-01 PROCEDURE — 99152 MOD SED SAME PHYS/QHP 5/>YRS: CPT | Performed by: INTERNAL MEDICINE

## 2021-01-01 PROCEDURE — C1894 INTRO/SHEATH, NON-LASER: HCPCS | Performed by: INTERNAL MEDICINE

## 2021-01-01 PROCEDURE — 250N000011 HC RX IP 250 OP 636

## 2021-01-01 PROCEDURE — 99239 HOSP IP/OBS DSCHRG MGMT >30: CPT | Performed by: INTERNAL MEDICINE

## 2021-01-01 PROCEDURE — P9047 ALBUMIN (HUMAN), 25%, 50ML: HCPCS | Performed by: INTERNAL MEDICINE

## 2021-01-01 PROCEDURE — 84100 ASSAY OF PHOSPHORUS: CPT | Performed by: INTERNAL MEDICINE

## 2021-01-01 PROCEDURE — 86850 RBC ANTIBODY SCREEN: CPT | Performed by: EMERGENCY MEDICINE

## 2021-01-01 PROCEDURE — 258N000003 HC RX IP 258 OP 636: Performed by: INTERNAL MEDICINE

## 2021-01-01 PROCEDURE — 250N000013 HC RX MED GY IP 250 OP 250 PS 637: Performed by: INTERNAL MEDICINE

## 2021-01-01 PROCEDURE — 85027 COMPLETE CBC AUTOMATED: CPT | Performed by: INTERNAL MEDICINE

## 2021-01-01 PROCEDURE — 999N000099 HC STATISTIC MODERATE SEDATION < 10 MIN: Performed by: INTERNAL MEDICINE

## 2021-01-01 PROCEDURE — 82272 OCCULT BLD FECES 1-3 TESTS: CPT | Performed by: EMERGENCY MEDICINE

## 2021-01-01 PROCEDURE — 85018 HEMOGLOBIN: CPT | Performed by: HOSPITALIST

## 2021-01-01 PROCEDURE — 80048 BASIC METABOLIC PNL TOTAL CA: CPT | Performed by: INTERNAL MEDICINE

## 2021-01-01 PROCEDURE — 999N000128 HC STATISTIC PERIPHERAL IV START W/O US GUIDANCE

## 2021-01-01 PROCEDURE — 84100 ASSAY OF PHOSPHORUS: CPT | Performed by: NURSE PRACTITIONER

## 2021-01-01 PROCEDURE — 83690 ASSAY OF LIPASE: CPT | Performed by: EMERGENCY MEDICINE

## 2021-01-01 PROCEDURE — 81001 URINALYSIS AUTO W/SCOPE: CPT | Performed by: HOSPITALIST

## 2021-01-01 PROCEDURE — 999N001035 HC STATISTIC THROMBIN TIME NC: Performed by: INTERNAL MEDICINE

## 2021-01-01 PROCEDURE — 81001 URINALYSIS AUTO W/SCOPE: CPT | Performed by: INTERNAL MEDICINE

## 2021-01-01 PROCEDURE — 210N000001 HC R&B IMCU HEART CARE

## 2021-01-01 PROCEDURE — 3E043XZ INTRODUCTION OF VASOPRESSOR INTO CENTRAL VEIN, PERCUTANEOUS APPROACH: ICD-10-PCS | Performed by: INTERNAL MEDICINE

## 2021-01-01 PROCEDURE — 84132 ASSAY OF SERUM POTASSIUM: CPT | Performed by: EMERGENCY MEDICINE

## 2021-01-01 PROCEDURE — 85384 FIBRINOGEN ACTIVITY: CPT | Performed by: INTERNAL MEDICINE

## 2021-01-01 PROCEDURE — 96375 TX/PRO/DX INJ NEW DRUG ADDON: CPT

## 2021-01-01 PROCEDURE — 33210 INSERT ELECTRD/PM CATH SNGL: CPT | Performed by: INTERNAL MEDICINE

## 2021-01-01 PROCEDURE — C9113 INJ PANTOPRAZOLE SODIUM, VIA: HCPCS | Performed by: EMERGENCY MEDICINE

## 2021-01-01 PROCEDURE — 82043 UR ALBUMIN QUANTITATIVE: CPT | Performed by: INTERNAL MEDICINE

## 2021-01-01 PROCEDURE — 85730 THROMBOPLASTIN TIME PARTIAL: CPT | Performed by: INTERNAL MEDICINE

## 2021-01-01 PROCEDURE — 250N000011 HC RX IP 250 OP 636: Performed by: NURSE PRACTITIONER

## 2021-01-01 PROCEDURE — 272N000001 HC OR GENERAL SUPPLY STERILE: Performed by: INTERNAL MEDICINE

## 2021-01-01 PROCEDURE — 84300 ASSAY OF URINE SODIUM: CPT | Performed by: INTERNAL MEDICINE

## 2021-01-01 PROCEDURE — 43235 EGD DIAGNOSTIC BRUSH WASH: CPT | Performed by: INTERNAL MEDICINE

## 2021-01-01 PROCEDURE — 999N001076 HC STATISTIC SODIUM ED POCT

## 2021-01-01 PROCEDURE — 258N000001 HC RX 258: Performed by: INTERNAL MEDICINE

## 2021-01-01 PROCEDURE — 99223 1ST HOSP IP/OBS HIGH 75: CPT | Mod: AI | Performed by: INTERNAL MEDICINE

## 2021-01-01 PROCEDURE — 99291 CRITICAL CARE FIRST HOUR: CPT | Mod: 25

## 2021-01-01 PROCEDURE — 71046 X-RAY EXAM CHEST 2 VIEWS: CPT

## 2021-01-01 PROCEDURE — 85025 COMPLETE CBC W/AUTO DIFF WBC: CPT | Performed by: INTERNAL MEDICINE

## 2021-01-01 PROCEDURE — 99232 SBSQ HOSP IP/OBS MODERATE 35: CPT | Performed by: INTERNAL MEDICINE

## 2021-01-01 PROCEDURE — 82306 VITAMIN D 25 HYDROXY: CPT | Performed by: INTERNAL MEDICINE

## 2021-01-01 PROCEDURE — 99233 SBSQ HOSP IP/OBS HIGH 50: CPT | Performed by: HOSPITALIST

## 2021-01-01 PROCEDURE — 999N001023 HC STATISTIC INR NC: Performed by: INTERNAL MEDICINE

## 2021-01-01 PROCEDURE — 82803 BLOOD GASES ANY COMBINATION: CPT

## 2021-01-01 PROCEDURE — 250N000013 HC RX MED GY IP 250 OP 250 PS 637: Performed by: PHYSICIAN ASSISTANT

## 2021-01-01 PROCEDURE — 96366 THER/PROPH/DIAG IV INF ADDON: CPT

## 2021-01-01 PROCEDURE — 80053 COMPREHEN METABOLIC PANEL: CPT | Performed by: INTERNAL MEDICINE

## 2021-01-01 PROCEDURE — 258N000003 HC RX IP 258 OP 636: Performed by: NURSE PRACTITIONER

## 2021-01-01 PROCEDURE — 999N001077 HC STATISTIC POTASSIUM ED POCT

## 2021-01-01 PROCEDURE — 99214 OFFICE O/P EST MOD 30 MIN: CPT | Performed by: INTERNAL MEDICINE

## 2021-01-01 PROCEDURE — 258N000001 HC RX 258: Performed by: EMERGENCY MEDICINE

## 2021-01-01 PROCEDURE — 85610 PROTHROMBIN TIME: CPT | Performed by: NURSE PRACTITIONER

## 2021-01-01 PROCEDURE — 210N000002 HC R&B HEART CARE

## 2021-01-01 PROCEDURE — 83540 ASSAY OF IRON: CPT | Performed by: INTERNAL MEDICINE

## 2021-01-01 PROCEDURE — 258N000003 HC RX IP 258 OP 636: Performed by: EMERGENCY MEDICINE

## 2021-01-01 PROCEDURE — 99285 EMERGENCY DEPT VISIT HI MDM: CPT | Mod: 25

## 2021-01-01 PROCEDURE — 86147 CARDIOLIPIN ANTIBODY EA IG: CPT | Performed by: INTERNAL MEDICINE

## 2021-01-01 PROCEDURE — 86900 BLOOD TYPING SEROLOGIC ABO: CPT | Performed by: EMERGENCY MEDICINE

## 2021-01-01 PROCEDURE — 76770 US EXAM ABDO BACK WALL COMP: CPT

## 2021-01-01 PROCEDURE — 82565 ASSAY OF CREATININE: CPT

## 2021-01-01 PROCEDURE — 999N001079 HC STATISTIC HEMATOCRIT ED POCT

## 2021-01-01 PROCEDURE — 36415 COLL VENOUS BLD VENIPUNCTURE: CPT | Performed by: HOSPITALIST

## 2021-01-01 PROCEDURE — 85610 PROTHROMBIN TIME: CPT | Performed by: EMERGENCY MEDICINE

## 2021-01-01 PROCEDURE — 999N000157 HC STATISTIC RCP TIME EA 10 MIN

## 2021-01-01 PROCEDURE — 5A09357 ASSISTANCE WITH RESPIRATORY VENTILATION, LESS THAN 24 CONSECUTIVE HOURS, CONTINUOUS POSITIVE AIRWAY PRESSURE: ICD-10-PCS | Performed by: INTERNAL MEDICINE

## 2021-01-01 PROCEDURE — 88112 CYTOPATH CELL ENHANCE TECH: CPT | Mod: TC | Performed by: PHYSICIAN ASSISTANT

## 2021-01-01 PROCEDURE — 99223 1ST HOSP IP/OBS HIGH 75: CPT | Performed by: INTERNAL MEDICINE

## 2021-01-01 PROCEDURE — 82805 BLOOD GASES W/O2 SATURATION: CPT | Performed by: NURSE PRACTITIONER

## 2021-01-01 PROCEDURE — 80048 BASIC METABOLIC PNL TOTAL CA: CPT | Performed by: NURSE PRACTITIONER

## 2021-01-01 PROCEDURE — 0DJ08ZZ INSPECTION OF UPPER INTESTINAL TRACT, VIA NATURAL OR ARTIFICIAL OPENING ENDOSCOPIC: ICD-10-PCS | Performed by: INTERNAL MEDICINE

## 2021-01-01 PROCEDURE — 999N000011 HC STATISTIC ANESTHESIA CASE

## 2021-01-01 PROCEDURE — 99221 1ST HOSP IP/OBS SF/LOW 40: CPT | Performed by: UROLOGY

## 2021-01-01 PROCEDURE — 74178 CT ABD&PLV WO CNTR FLWD CNTR: CPT | Mod: MG

## 2021-01-01 PROCEDURE — 71045 X-RAY EXAM CHEST 1 VIEW: CPT

## 2021-01-01 PROCEDURE — 96376 TX/PRO/DX INJ SAME DRUG ADON: CPT

## 2021-01-01 PROCEDURE — 93306 TTE W/DOPPLER COMPLETE: CPT | Mod: 26 | Performed by: INTERNAL MEDICINE

## 2021-01-01 PROCEDURE — 0W3P8ZZ CONTROL BLEEDING IN GASTROINTESTINAL TRACT, VIA NATURAL OR ARTIFICIAL OPENING ENDOSCOPIC: ICD-10-PCS | Performed by: INTERNAL MEDICINE

## 2021-01-01 PROCEDURE — 88112 CYTOPATH CELL ENHANCE TECH: CPT | Mod: 26 | Performed by: PATHOLOGY

## 2021-01-01 PROCEDURE — 93010 ELECTROCARDIOGRAM REPORT: CPT | Performed by: INTERNAL MEDICINE

## 2021-01-01 PROCEDURE — 250N000012 HC RX MED GY IP 250 OP 636 PS 637: Performed by: EMERGENCY MEDICINE

## 2021-01-01 PROCEDURE — 36415 COLL VENOUS BLD VENIPUNCTURE: CPT | Performed by: NURSE PRACTITIONER

## 2021-01-01 PROCEDURE — 99153 MOD SED SAME PHYS/QHP EA: CPT | Performed by: INTERNAL MEDICINE

## 2021-01-01 PROCEDURE — 370N000003 HC ANESTHESIA WARD SERVICE: Performed by: NURSE ANESTHETIST, CERTIFIED REGISTERED

## 2021-01-01 PROCEDURE — 99207 PR NO CHARGE LOS: CPT | Performed by: NURSE PRACTITIONER

## 2021-01-01 PROCEDURE — 258N000002 HC RX IP 258 OP 250: Performed by: INTERNAL MEDICINE

## 2021-01-01 PROCEDURE — 45382 COLONOSCOPY W/CONTROL BLEED: CPT | Performed by: INTERNAL MEDICINE

## 2021-01-01 PROCEDURE — 5A1223Z PERFORMANCE OF CARDIAC PACING, CONTINUOUS: ICD-10-PCS | Performed by: INTERNAL MEDICINE

## 2021-01-01 PROCEDURE — 80076 HEPATIC FUNCTION PANEL: CPT | Performed by: NURSE PRACTITIONER

## 2021-01-01 PROCEDURE — 82550 ASSAY OF CK (CPK): CPT | Performed by: EMERGENCY MEDICINE

## 2021-01-01 PROCEDURE — 83735 ASSAY OF MAGNESIUM: CPT | Performed by: NURSE PRACTITIONER

## 2021-01-01 PROCEDURE — G0500 MOD SEDAT ENDO SERVICE >5YRS: HCPCS | Performed by: INTERNAL MEDICINE

## 2021-01-01 RX ORDER — HYDROMORPHONE HYDROCHLORIDE 1 MG/ML
0.5 INJECTION, SOLUTION INTRAMUSCULAR; INTRAVENOUS; SUBCUTANEOUS
Status: DISCONTINUED | OUTPATIENT
Start: 2021-01-01 | End: 2021-01-01 | Stop reason: HOSPADM

## 2021-01-01 RX ORDER — SODIUM POLYSTYRENE SULFONATE 15 G/60ML
30 SUSPENSION ORAL; RECTAL ONCE
Status: COMPLETED | OUTPATIENT
Start: 2021-01-01 | End: 2021-01-01

## 2021-01-01 RX ORDER — FOLIC ACID 1 MG/1
1 TABLET ORAL DAILY
Status: DISCONTINUED | OUTPATIENT
Start: 2021-01-01 | End: 2021-01-01 | Stop reason: HOSPADM

## 2021-01-01 RX ORDER — ACETAMINOPHEN 650 MG/1
650 SUPPOSITORY RECTAL EVERY 6 HOURS PRN
Status: DISCONTINUED | OUTPATIENT
Start: 2021-01-01 | End: 2021-01-01 | Stop reason: HOSPADM

## 2021-01-01 RX ORDER — LORAZEPAM 0.5 MG/1
1-2 TABLET ORAL EVERY 30 MIN PRN
Status: DISCONTINUED | OUTPATIENT
Start: 2021-01-01 | End: 2021-01-01

## 2021-01-01 RX ORDER — PANTOPRAZOLE SODIUM 40 MG/1
40 TABLET, DELAYED RELEASE ORAL
Status: DISCONTINUED | OUTPATIENT
Start: 2021-01-01 | End: 2021-01-01 | Stop reason: HOSPADM

## 2021-01-01 RX ORDER — LIDOCAINE 40 MG/G
CREAM TOPICAL
Status: DISCONTINUED | OUTPATIENT
Start: 2021-01-01 | End: 2021-01-01 | Stop reason: HOSPADM

## 2021-01-01 RX ORDER — SODIUM CHLORIDE 9 MG/ML
INJECTION, SOLUTION INTRAVENOUS CONTINUOUS
Status: CANCELLED | OUTPATIENT
Start: 2021-01-01

## 2021-01-01 RX ORDER — POLYETHYLENE GLYCOL 3350 17 G/17G
17 POWDER, FOR SOLUTION ORAL DAILY PRN
Status: DISCONTINUED | OUTPATIENT
Start: 2021-01-01 | End: 2021-01-01 | Stop reason: HOSPADM

## 2021-01-01 RX ORDER — POTASSIUM CHLORIDE 1500 MG/1
20 TABLET, EXTENDED RELEASE ORAL
Status: CANCELLED | OUTPATIENT
Start: 2021-01-01

## 2021-01-01 RX ORDER — NALOXONE HYDROCHLORIDE 0.4 MG/ML
0.2 INJECTION, SOLUTION INTRAMUSCULAR; INTRAVENOUS; SUBCUTANEOUS
Status: DISCONTINUED | OUTPATIENT
Start: 2021-01-01 | End: 2021-01-01 | Stop reason: HOSPADM

## 2021-01-01 RX ORDER — OXYCODONE HYDROCHLORIDE 5 MG/1
5 TABLET ORAL EVERY 4 HOURS PRN
Status: DISCONTINUED | OUTPATIENT
Start: 2021-01-01 | End: 2021-01-01 | Stop reason: HOSPADM

## 2021-01-01 RX ORDER — FENTANYL CITRATE 50 UG/ML
INJECTION, SOLUTION INTRAMUSCULAR; INTRAVENOUS
Status: DISCONTINUED | OUTPATIENT
Start: 2021-01-01 | End: 2021-01-01 | Stop reason: HOSPADM

## 2021-01-01 RX ORDER — SODIUM CHLORIDE 450 MG/100ML
INJECTION, SOLUTION INTRAVENOUS CONTINUOUS
Status: CANCELLED | OUTPATIENT
Start: 2021-01-01

## 2021-01-01 RX ORDER — NITROGLYCERIN 0.4 MG/1
0.4 TABLET SUBLINGUAL EVERY 5 MIN PRN
Status: DISCONTINUED | OUTPATIENT
Start: 2021-01-01 | End: 2021-01-01

## 2021-01-01 RX ORDER — LIDOCAINE 40 MG/G
CREAM TOPICAL
Status: CANCELLED | OUTPATIENT
Start: 2021-01-01

## 2021-01-01 RX ORDER — HYDROMORPHONE HYDROCHLORIDE 1 MG/ML
0.3 INJECTION, SOLUTION INTRAMUSCULAR; INTRAVENOUS; SUBCUTANEOUS EVERY 6 HOURS PRN
Status: DISCONTINUED | OUTPATIENT
Start: 2021-01-01 | End: 2021-01-01

## 2021-01-01 RX ORDER — LIDOCAINE 40 MG/G
CREAM TOPICAL
Status: DISCONTINUED | OUTPATIENT
Start: 2021-01-01 | End: 2021-01-01

## 2021-01-01 RX ORDER — ACETAMINOPHEN 325 MG/1
650 TABLET ORAL EVERY 4 HOURS PRN
Status: DISCONTINUED | OUTPATIENT
Start: 2021-01-01 | End: 2021-01-01 | Stop reason: HOSPADM

## 2021-01-01 RX ORDER — CARBOXYMETHYLCELLULOSE SODIUM 5 MG/ML
1 SOLUTION/ DROPS OPHTHALMIC
Status: DISCONTINUED | OUTPATIENT
Start: 2021-01-01 | End: 2021-01-01 | Stop reason: HOSPADM

## 2021-01-01 RX ORDER — LANOLIN ALCOHOL/MO/W.PET/CERES
200 CREAM (GRAM) TOPICAL 3 TIMES DAILY
Status: DISPENSED | OUTPATIENT
Start: 2021-01-01 | End: 2021-01-01

## 2021-01-01 RX ORDER — PROCHLORPERAZINE 25 MG
12.5 SUPPOSITORY, RECTAL RECTAL EVERY 12 HOURS PRN
Status: DISCONTINUED | OUTPATIENT
Start: 2021-01-01 | End: 2021-01-01 | Stop reason: HOSPADM

## 2021-01-01 RX ORDER — MAGNESIUM CARB/ALUMINUM HYDROX 105-160MG
296 TABLET,CHEWABLE ORAL ONCE
Status: COMPLETED | OUTPATIENT
Start: 2021-01-01 | End: 2021-01-01

## 2021-01-01 RX ORDER — AMOXICILLIN 250 MG
2 CAPSULE ORAL 2 TIMES DAILY PRN
Status: DISCONTINUED | OUTPATIENT
Start: 2021-01-01 | End: 2021-01-01 | Stop reason: HOSPADM

## 2021-01-01 RX ORDER — OLANZAPINE 5 MG/1
5 TABLET, ORALLY DISINTEGRATING ORAL EVERY 6 HOURS PRN
Status: DISCONTINUED | OUTPATIENT
Start: 2021-01-01 | End: 2021-01-01 | Stop reason: HOSPADM

## 2021-01-01 RX ORDER — FLUMAZENIL 0.1 MG/ML
0.2 INJECTION, SOLUTION INTRAVENOUS
Status: DISCONTINUED | OUTPATIENT
Start: 2021-01-01 | End: 2021-01-01

## 2021-01-01 RX ORDER — DEXTROSE MONOHYDRATE 25 G/50ML
25-50 INJECTION, SOLUTION INTRAVENOUS
Status: DISCONTINUED | OUTPATIENT
Start: 2021-01-01 | End: 2021-01-01

## 2021-01-01 RX ORDER — IOPAMIDOL 755 MG/ML
110 INJECTION, SOLUTION INTRAVASCULAR ONCE
Status: COMPLETED | OUTPATIENT
Start: 2021-01-01 | End: 2021-01-01

## 2021-01-01 RX ORDER — SULFAMETHOXAZOLE/TRIMETHOPRIM 800-160 MG
1 TABLET ORAL 2 TIMES DAILY
Qty: 20 TABLET | Refills: 3 | Status: ON HOLD | OUTPATIENT
Start: 2021-01-01 | End: 2021-01-01

## 2021-01-01 RX ORDER — LORAZEPAM 2 MG/ML
0.5 INJECTION INTRAMUSCULAR
Status: CANCELLED | OUTPATIENT
Start: 2021-01-01

## 2021-01-01 RX ORDER — DEXTROSE MONOHYDRATE, SODIUM CHLORIDE, AND POTASSIUM CHLORIDE 50; 1.49; 4.5 G/1000ML; G/1000ML; G/1000ML
INJECTION, SOLUTION INTRAVENOUS CONTINUOUS
Status: DISCONTINUED | OUTPATIENT
Start: 2021-01-01 | End: 2021-01-01

## 2021-01-01 RX ORDER — PROCHLORPERAZINE MALEATE 5 MG
5 TABLET ORAL EVERY 6 HOURS PRN
Status: DISCONTINUED | OUTPATIENT
Start: 2021-01-01 | End: 2021-01-01 | Stop reason: HOSPADM

## 2021-01-01 RX ORDER — NALOXONE HYDROCHLORIDE 0.4 MG/ML
0.4 INJECTION, SOLUTION INTRAMUSCULAR; INTRAVENOUS; SUBCUTANEOUS
Status: DISCONTINUED | OUTPATIENT
Start: 2021-01-01 | End: 2021-01-01 | Stop reason: HOSPADM

## 2021-01-01 RX ORDER — CALCIUM GLUCONATE 94 MG/ML
1 INJECTION, SOLUTION INTRAVENOUS ONCE
Status: COMPLETED | OUTPATIENT
Start: 2021-01-01 | End: 2021-01-01

## 2021-01-01 RX ORDER — DEXTROSE MONOHYDRATE 25 G/50ML
25 INJECTION, SOLUTION INTRAVENOUS ONCE
Status: COMPLETED | OUTPATIENT
Start: 2021-01-01 | End: 2021-01-01

## 2021-01-01 RX ORDER — BISACODYL 10 MG
10 SUPPOSITORY, RECTAL RECTAL DAILY PRN
Status: DISCONTINUED | OUTPATIENT
Start: 2021-01-01 | End: 2021-01-01 | Stop reason: HOSPADM

## 2021-01-01 RX ORDER — FENTANYL CITRATE 50 UG/ML
INJECTION, SOLUTION INTRAMUSCULAR; INTRAVENOUS PRN
Status: COMPLETED | OUTPATIENT
Start: 2021-01-01 | End: 2021-01-01

## 2021-01-01 RX ORDER — SODIUM CHLORIDE AND POTASSIUM CHLORIDE 150; 900 MG/100ML; MG/100ML
INJECTION, SOLUTION INTRAVENOUS CONTINUOUS
Status: DISCONTINUED | OUTPATIENT
Start: 2021-01-01 | End: 2021-01-01

## 2021-01-01 RX ORDER — ONDANSETRON 4 MG/1
4 TABLET, ORALLY DISINTEGRATING ORAL EVERY 6 HOURS PRN
Status: DISCONTINUED | OUTPATIENT
Start: 2021-01-01 | End: 2021-01-01 | Stop reason: HOSPADM

## 2021-01-01 RX ORDER — FLUMAZENIL 0.1 MG/ML
0.2 INJECTION, SOLUTION INTRAVENOUS
Status: DISCONTINUED | OUTPATIENT
Start: 2021-01-01 | End: 2021-01-01 | Stop reason: HOSPADM

## 2021-01-01 RX ORDER — ATROPINE SULFATE 0.1 MG/ML
0.4 INJECTION INTRAVENOUS
Status: DISCONTINUED | OUTPATIENT
Start: 2021-01-01 | End: 2021-01-01

## 2021-01-01 RX ORDER — PANTOPRAZOLE SODIUM 40 MG/1
40 TABLET, DELAYED RELEASE ORAL
Qty: 30 TABLET | Refills: 0 | Status: SHIPPED | OUTPATIENT
Start: 2021-01-01

## 2021-01-01 RX ORDER — ALBUMIN (HUMAN) 12.5 G/50ML
25 SOLUTION INTRAVENOUS EVERY 6 HOURS
Status: DISCONTINUED | OUTPATIENT
Start: 2021-01-01 | End: 2021-01-01

## 2021-01-01 RX ORDER — LORAZEPAM 0.5 MG/1
0.5 TABLET ORAL
Status: CANCELLED | OUTPATIENT
Start: 2021-01-01

## 2021-01-01 RX ORDER — BISACODYL 10 MG
10 SUPPOSITORY, RECTAL RECTAL
Status: DISCONTINUED | OUTPATIENT
Start: 2021-04-07 | End: 2021-01-01 | Stop reason: HOSPADM

## 2021-01-01 RX ORDER — LORAZEPAM 2 MG/ML
1-2 INJECTION INTRAMUSCULAR EVERY 30 MIN PRN
Status: DISCONTINUED | OUTPATIENT
Start: 2021-01-01 | End: 2021-01-01

## 2021-01-01 RX ORDER — MAGNESIUM SULFATE HEPTAHYDRATE 40 MG/ML
2 INJECTION, SOLUTION INTRAVENOUS ONCE
Status: COMPLETED | OUTPATIENT
Start: 2021-01-01 | End: 2021-01-01

## 2021-01-01 RX ORDER — CEFTRIAXONE 1 G/1
1 INJECTION, POWDER, FOR SOLUTION INTRAMUSCULAR; INTRAVENOUS ONCE
Status: COMPLETED | OUTPATIENT
Start: 2021-01-01 | End: 2021-01-01

## 2021-01-01 RX ORDER — DEXTROSE MONOHYDRATE 100 MG/ML
INJECTION, SOLUTION INTRAVENOUS CONTINUOUS
Status: DISCONTINUED | OUTPATIENT
Start: 2021-01-01 | End: 2021-01-01

## 2021-01-01 RX ORDER — ONDANSETRON 2 MG/ML
4 INJECTION INTRAMUSCULAR; INTRAVENOUS EVERY 30 MIN PRN
Status: DISCONTINUED | OUTPATIENT
Start: 2021-01-01 | End: 2021-01-01

## 2021-01-01 RX ORDER — NADOLOL 20 MG/1
20 TABLET ORAL DAILY
Qty: 30 TABLET | Refills: 0 | Status: SHIPPED | OUTPATIENT
Start: 2021-01-01

## 2021-01-01 RX ORDER — ALBUMIN (HUMAN) 12.5 G/50ML
25 SOLUTION INTRAVENOUS EVERY 6 HOURS
Status: COMPLETED | OUTPATIENT
Start: 2021-01-01 | End: 2021-01-01

## 2021-01-01 RX ORDER — OXYCODONE HCL 20 MG/ML
5-10 CONCENTRATE, ORAL ORAL
Status: DISCONTINUED | OUTPATIENT
Start: 2021-01-01 | End: 2021-01-01 | Stop reason: HOSPADM

## 2021-01-01 RX ORDER — DEXTROSE MONOHYDRATE 25 G/50ML
50 INJECTION, SOLUTION INTRAVENOUS ONCE
Status: DISCONTINUED | OUTPATIENT
Start: 2021-01-01 | End: 2021-01-01

## 2021-01-01 RX ORDER — LIDOCAINE HYDROCHLORIDE 20 MG/ML
JELLY TOPICAL ONCE
Status: COMPLETED | OUTPATIENT
Start: 2021-01-01 | End: 2021-01-01

## 2021-01-01 RX ORDER — LANOLIN ALCOHOL/MO/W.PET/CERES
100 CREAM (GRAM) TOPICAL 3 TIMES DAILY
Status: DISCONTINUED | OUTPATIENT
Start: 2021-01-01 | End: 2021-01-01

## 2021-01-01 RX ORDER — POLYETHYLENE GLYCOL 3350 17 G/17G
238 POWDER, FOR SOLUTION ORAL ONCE
Status: COMPLETED | OUTPATIENT
Start: 2021-01-01 | End: 2021-01-01

## 2021-01-01 RX ORDER — DOPAMINE HYDROCHLORIDE 160 MG/100ML
INJECTION, SOLUTION INTRAVENOUS
Status: COMPLETED
Start: 2021-01-01 | End: 2021-01-01

## 2021-01-01 RX ORDER — SODIUM CHLORIDE 9 MG/ML
INJECTION, SOLUTION INTRAVENOUS CONTINUOUS
Status: DISCONTINUED | OUTPATIENT
Start: 2021-01-01 | End: 2021-01-01

## 2021-01-01 RX ORDER — AMOXICILLIN 250 MG
1 CAPSULE ORAL 2 TIMES DAILY PRN
Status: DISCONTINUED | OUTPATIENT
Start: 2021-01-01 | End: 2021-01-01 | Stop reason: HOSPADM

## 2021-01-01 RX ORDER — NICOTINE POLACRILEX 4 MG
15-30 LOZENGE BUCCAL
Status: DISCONTINUED | OUTPATIENT
Start: 2021-01-01 | End: 2021-01-01

## 2021-01-01 RX ORDER — DEXTROSE MONOHYDRATE 25 G/50ML
50 INJECTION, SOLUTION INTRAVENOUS ONCE
Status: COMPLETED | OUTPATIENT
Start: 2021-01-01 | End: 2021-01-01

## 2021-01-01 RX ORDER — MIDODRINE HYDROCHLORIDE 5 MG/1
5 TABLET ORAL
Status: DISCONTINUED | OUTPATIENT
Start: 2021-01-01 | End: 2021-01-01

## 2021-01-01 RX ORDER — LANOLIN ALCOHOL/MO/W.PET/CERES
100 CREAM (GRAM) TOPICAL DAILY
Status: DISCONTINUED | OUTPATIENT
Start: 2021-04-09 | End: 2021-01-01

## 2021-01-01 RX ORDER — ATROPINE SULFATE 10 MG/ML
1-2 SOLUTION/ DROPS OPHTHALMIC
Status: DISCONTINUED | OUTPATIENT
Start: 2021-01-01 | End: 2021-01-01 | Stop reason: HOSPADM

## 2021-01-01 RX ORDER — MULTIPLE VITAMINS W/ MINERALS TAB 9MG-400MCG
1 TAB ORAL DAILY
Status: DISCONTINUED | OUTPATIENT
Start: 2021-01-01 | End: 2021-01-01

## 2021-01-01 RX ORDER — OLANZAPINE 5 MG/1
5-10 TABLET, ORALLY DISINTEGRATING ORAL EVERY 6 HOURS PRN
Status: DISCONTINUED | OUTPATIENT
Start: 2021-01-01 | End: 2021-01-01

## 2021-01-01 RX ORDER — ONDANSETRON 2 MG/ML
4 INJECTION INTRAMUSCULAR; INTRAVENOUS EVERY 6 HOURS PRN
Status: DISCONTINUED | OUTPATIENT
Start: 2021-01-01 | End: 2021-01-01 | Stop reason: HOSPADM

## 2021-01-01 RX ORDER — CEFTRIAXONE 1 G/1
1 INJECTION, POWDER, FOR SOLUTION INTRAMUSCULAR; INTRAVENOUS EVERY 12 HOURS
Status: DISCONTINUED | OUTPATIENT
Start: 2021-01-01 | End: 2021-01-01

## 2021-01-01 RX ORDER — ATROPINE SULFATE 0.1 MG/ML
0.5 INJECTION INTRAVENOUS ONCE
Status: COMPLETED | OUTPATIENT
Start: 2021-01-01 | End: 2021-01-01

## 2021-01-01 RX ORDER — OXYCODONE HCL 20 MG/ML
2.5-5 CONCENTRATE, ORAL ORAL
Status: DISCONTINUED | OUTPATIENT
Start: 2021-01-01 | End: 2021-01-01

## 2021-01-01 RX ORDER — HALOPERIDOL 5 MG/ML
2.5-5 INJECTION INTRAMUSCULAR EVERY 6 HOURS PRN
Status: DISCONTINUED | OUTPATIENT
Start: 2021-01-01 | End: 2021-01-01

## 2021-01-01 RX ADMIN — ALBUMIN HUMAN 25 G: 0.25 SOLUTION INTRAVENOUS at 05:29

## 2021-01-01 RX ADMIN — THIAMINE HCL TAB 100 MG 200 MG: 100 TAB at 22:12

## 2021-01-01 RX ADMIN — DOPAMINE HYDROCHLORIDE 2 MCG/KG/MIN: 160 INJECTION, SOLUTION INTRAVENOUS at 02:57

## 2021-01-01 RX ADMIN — DEXTROSE MONOHYDRATE: 100 INJECTION, SOLUTION INTRAVENOUS at 08:16

## 2021-01-01 RX ADMIN — ALBUMIN HUMAN 25 G: 0.25 SOLUTION INTRAVENOUS at 10:20

## 2021-01-01 RX ADMIN — TOPICAL ANESTHETIC 1 SPRAY: 200 SPRAY DENTAL; PERIODONTAL at 16:25

## 2021-01-01 RX ADMIN — CEFTRIAXONE SODIUM 1 G: 1 INJECTION, POWDER, FOR SOLUTION INTRAMUSCULAR; INTRAVENOUS at 13:37

## 2021-01-01 RX ADMIN — CALCIUM GLUCONATE 1 G: 98 INJECTION, SOLUTION INTRAVENOUS at 13:11

## 2021-01-01 RX ADMIN — MULTIPLE VITAMINS W/ MINERALS TAB 1 TABLET: TAB at 17:13

## 2021-01-01 RX ADMIN — PANTOPRAZOLE SODIUM 40 MG: 40 INJECTION, POWDER, FOR SOLUTION INTRAVENOUS at 09:04

## 2021-01-01 RX ADMIN — DEXTROSE MONOHYDRATE: 100 INJECTION, SOLUTION INTRAVENOUS at 13:46

## 2021-01-01 RX ADMIN — MIDAZOLAM 1 MG: 1 INJECTION INTRAMUSCULAR; INTRAVENOUS at 16:25

## 2021-01-01 RX ADMIN — CEFTRIAXONE SODIUM 1 G: 1 INJECTION, POWDER, FOR SOLUTION INTRAMUSCULAR; INTRAVENOUS at 23:56

## 2021-01-01 RX ADMIN — PANTOPRAZOLE SODIUM 8 MG/HR: 40 INJECTION, POWDER, FOR SOLUTION INTRAVENOUS at 15:03

## 2021-01-01 RX ADMIN — CEFTRIAXONE SODIUM 1 G: 1 INJECTION, POWDER, FOR SOLUTION INTRAMUSCULAR; INTRAVENOUS at 12:05

## 2021-01-01 RX ADMIN — ALBUMIN HUMAN 25 G: 0.25 SOLUTION INTRAVENOUS at 23:01

## 2021-01-01 RX ADMIN — MIDAZOLAM 1 MG: 1 INJECTION INTRAMUSCULAR; INTRAVENOUS at 16:27

## 2021-01-01 RX ADMIN — MAGNESIUM CITRATE 296 ML: 1.75 LIQUID ORAL at 08:19

## 2021-01-01 RX ADMIN — THIAMINE HCL TAB 100 MG 200 MG: 100 TAB at 17:13

## 2021-01-01 RX ADMIN — OXYCODONE HYDROCHLORIDE 5 MG: 100 SOLUTION ORAL at 10:25

## 2021-01-01 RX ADMIN — OXYCODONE HYDROCHLORIDE 5 MG: 5 TABLET ORAL at 13:08

## 2021-01-01 RX ADMIN — OCTREOTIDE ACETATE 50 MCG/HR: 200 INJECTION, SOLUTION INTRAVENOUS; SUBCUTANEOUS at 03:59

## 2021-01-01 RX ADMIN — DEXTROSE MONOHYDRATE 50 ML: 500 INJECTION PARENTERAL at 08:09

## 2021-01-01 RX ADMIN — DEXTROSE MONOHYDRATE 25 G: 500 INJECTION PARENTERAL at 13:23

## 2021-01-01 RX ADMIN — PANTOPRAZOLE SODIUM 40 MG: 40 INJECTION, POWDER, FOR SOLUTION INTRAVENOUS at 22:12

## 2021-01-01 RX ADMIN — SODIUM CHLORIDE 72 ML: 9 INJECTION, SOLUTION INTRAVENOUS at 12:06

## 2021-01-01 RX ADMIN — OCTREOTIDE ACETATE 25 MCG/HR: 200 INJECTION, SOLUTION INTRAVENOUS; SUBCUTANEOUS at 00:44

## 2021-01-01 RX ADMIN — FOLIC ACID 1 MG: 1 TABLET ORAL at 17:13

## 2021-01-01 RX ADMIN — POTASSIUM CHLORIDE, DEXTROSE MONOHYDRATE AND SODIUM CHLORIDE: 150; 5; 450 INJECTION, SOLUTION INTRAVENOUS at 06:38

## 2021-01-01 RX ADMIN — PANTOPRAZOLE SODIUM 8 MG/HR: 40 INJECTION, POWDER, FOR SOLUTION INTRAVENOUS at 03:36

## 2021-01-01 RX ADMIN — MIDAZOLAM 2 MG: 1 INJECTION INTRAMUSCULAR; INTRAVENOUS at 11:20

## 2021-01-01 RX ADMIN — MELATONIN TAB 3 MG 5 MG: 3 TAB at 22:12

## 2021-01-01 RX ADMIN — PANTOPRAZOLE SODIUM 8 MG/HR: 40 INJECTION, POWDER, FOR SOLUTION INTRAVENOUS at 00:49

## 2021-01-01 RX ADMIN — PANTOPRAZOLE SODIUM 40 MG: 40 TABLET, DELAYED RELEASE ORAL at 09:38

## 2021-01-01 RX ADMIN — ATROPINE SULFATE 0.5 MG: 0.1 INJECTION PARENTERAL at 02:50

## 2021-01-01 RX ADMIN — SODIUM CHLORIDE 50 ML/HR: 9 INJECTION, SOLUTION INTRAVENOUS at 10:18

## 2021-01-01 RX ADMIN — THIAMINE HCL TAB 100 MG 200 MG: 100 TAB at 20:57

## 2021-01-01 RX ADMIN — SODIUM POLYSTYRENE SULFONATE 30 G: 15 SUSPENSION ORAL; RECTAL at 17:17

## 2021-01-01 RX ADMIN — Medication 1 MG: at 02:46

## 2021-01-01 RX ADMIN — IOPAMIDOL 110 ML: 755 INJECTION, SOLUTION INTRAVENOUS at 12:05

## 2021-01-01 RX ADMIN — POTASSIUM CHLORIDE, DEXTROSE MONOHYDRATE AND SODIUM CHLORIDE: 150; 5; 450 INJECTION, SOLUTION INTRAVENOUS at 17:28

## 2021-01-01 RX ADMIN — ALBUMIN HUMAN 25 G: 0.25 SOLUTION INTRAVENOUS at 11:46

## 2021-01-01 RX ADMIN — SODIUM BICARBONATE: 84 INJECTION, SOLUTION INTRAVENOUS at 03:16

## 2021-01-01 RX ADMIN — OXYCODONE HYDROCHLORIDE 5 MG: 5 TABLET ORAL at 22:00

## 2021-01-01 RX ADMIN — PANTOPRAZOLE SODIUM 40 MG: 40 INJECTION, POWDER, FOR SOLUTION INTRAVENOUS at 08:10

## 2021-01-01 RX ADMIN — MAGNESIUM SULFATE IN WATER 2 G: 40 INJECTION, SOLUTION INTRAVENOUS at 00:31

## 2021-01-01 RX ADMIN — LIDOCAINE HYDROCHLORIDE: 20 JELLY TOPICAL at 11:46

## 2021-01-01 RX ADMIN — ALBUMIN HUMAN 25 G: 0.25 SOLUTION INTRAVENOUS at 16:36

## 2021-01-01 RX ADMIN — ONDANSETRON 4 MG: 2 INJECTION INTRAMUSCULAR; INTRAVENOUS at 14:09

## 2021-01-01 RX ADMIN — HYDROMORPHONE HYDROCHLORIDE 0.5 MG: 1 INJECTION, SOLUTION INTRAMUSCULAR; INTRAVENOUS; SUBCUTANEOUS at 09:38

## 2021-01-01 RX ADMIN — SODIUM BICARBONATE: 84 INJECTION, SOLUTION INTRAVENOUS at 11:45

## 2021-01-01 RX ADMIN — PANTOPRAZOLE SODIUM 40 MG: 40 INJECTION, POWDER, FOR SOLUTION INTRAVENOUS at 23:27

## 2021-01-01 RX ADMIN — PANTOPRAZOLE SODIUM 40 MG: 40 INJECTION, POWDER, FOR SOLUTION INTRAVENOUS at 20:58

## 2021-01-01 RX ADMIN — ATROPINE SULFATE 0.4 MG: 0.1 INJECTION PARENTERAL at 07:58

## 2021-01-01 RX ADMIN — Medication 1 MG: at 20:46

## 2021-01-01 RX ADMIN — ALBUMIN HUMAN 25 G: 0.25 SOLUTION INTRAVENOUS at 21:16

## 2021-01-01 RX ADMIN — FENTANYL CITRATE 50 MCG: 50 INJECTION, SOLUTION INTRAMUSCULAR; INTRAVENOUS at 16:25

## 2021-01-01 RX ADMIN — POTASSIUM CHLORIDE AND SODIUM CHLORIDE: 900; 150 INJECTION, SOLUTION INTRAVENOUS at 02:46

## 2021-01-01 RX ADMIN — SODIUM BICARBONATE: 84 INJECTION, SOLUTION INTRAVENOUS at 17:17

## 2021-01-01 RX ADMIN — PANTOPRAZOLE SODIUM 40 MG: 40 INJECTION, POWDER, FOR SOLUTION INTRAVENOUS at 21:16

## 2021-01-01 RX ADMIN — POTASSIUM CHLORIDE, DEXTROSE MONOHYDRATE AND SODIUM CHLORIDE: 150; 5; 450 INJECTION, SOLUTION INTRAVENOUS at 03:59

## 2021-01-01 RX ADMIN — FENTANYL CITRATE 50 MCG: 50 INJECTION, SOLUTION INTRAMUSCULAR; INTRAVENOUS at 11:20

## 2021-01-01 RX ADMIN — SODIUM CHLORIDE 9.1 UNITS: 9 INJECTION, SOLUTION INTRAVENOUS at 13:34

## 2021-01-01 RX ADMIN — THIAMINE HCL TAB 100 MG 100 MG: 100 TAB at 16:02

## 2021-01-01 RX ADMIN — IRON SUCROSE 200 MG: 20 INJECTION, SOLUTION INTRAVENOUS at 13:01

## 2021-01-01 RX ADMIN — FOLIC ACID 1 MG: 1 TABLET ORAL at 09:04

## 2021-01-01 RX ADMIN — SODIUM POLYSTYRENE SULFONATE 30 G: 15 SUSPENSION ORAL; RECTAL at 10:37

## 2021-01-01 RX ADMIN — ACETAMINOPHEN 650 MG: 325 TABLET, FILM COATED ORAL at 20:57

## 2021-01-01 RX ADMIN — HYDROMORPHONE HYDROCHLORIDE 0.3 MG: 1 INJECTION, SOLUTION INTRAMUSCULAR; INTRAVENOUS; SUBCUTANEOUS at 14:03

## 2021-01-01 RX ADMIN — OCTREOTIDE ACETATE 50 MCG/HR: 200 INJECTION, SOLUTION INTRAVENOUS; SUBCUTANEOUS at 06:36

## 2021-01-01 RX ADMIN — PHYTONADIONE 10 MG: 10 INJECTION, EMULSION INTRAMUSCULAR; INTRAVENOUS; SUBCUTANEOUS at 17:51

## 2021-01-01 RX ADMIN — ALBUMIN HUMAN 25 G: 0.25 SOLUTION INTRAVENOUS at 16:01

## 2021-01-01 RX ADMIN — POLYETHYLENE GLYCOL 3350 238 G: 17 POWDER, FOR SOLUTION ORAL at 18:14

## 2021-01-01 RX ADMIN — SODIUM CHLORIDE 1000 ML: 9 INJECTION, SOLUTION INTRAVENOUS at 13:25

## 2021-01-01 RX ADMIN — THIAMINE HCL TAB 100 MG 100 MG: 100 TAB at 21:15

## 2021-01-01 RX ADMIN — SODIUM CHLORIDE 250 ML: 9 INJECTION, SOLUTION INTRAVENOUS at 03:19

## 2021-01-01 RX ADMIN — THIAMINE HCL TAB 100 MG 200 MG: 100 TAB at 09:04

## 2021-01-01 RX ADMIN — SODIUM BICARBONATE 50 MEQ: 84 INJECTION INTRAVENOUS at 13:43

## 2021-01-01 RX ADMIN — GLUCAGON HYDROCHLORIDE 0.5 MG: KIT at 11:45

## 2021-01-01 RX ADMIN — CEFTRIAXONE SODIUM 1 G: 1 INJECTION, POWDER, FOR SOLUTION INTRAMUSCULAR; INTRAVENOUS at 23:28

## 2021-01-01 ASSESSMENT — MIFFLIN-ST. JEOR
SCORE: 1686.07
SCORE: 1681.07
SCORE: 1744.12
SCORE: 1795.38
SCORE: 1678.82
SCORE: 1797.88
SCORE: 1675.64

## 2021-01-01 ASSESSMENT — ACTIVITIES OF DAILY LIVING (ADL)
ADLS_ACUITY_SCORE: 22
WEAR_GLASSES_OR_BLIND: YES
DIFFICULTY_COMMUNICATING: NO
ADLS_ACUITY_SCORE: 22
VISION_MANAGEMENT: GLASSES
ADLS_ACUITY_SCORE: 22
ADLS_ACUITY_SCORE: 22
ADLS_ACUITY_SCORE: 18
DIFFICULTY_EATING/SWALLOWING: NO
EQUIPMENT_CURRENTLY_USED_AT_HOME: PROSTHESIS
WALKING_OR_CLIMBING_STAIRS: AMBULATION DIFFICULTY, ASSISTANCE 1 PERSON
ADLS_ACUITY_SCORE: 23
ADLS_ACUITY_SCORE: 22
ADLS_ACUITY_SCORE: 23
ADLS_ACUITY_SCORE: 22
CONCENTRATING,_REMEMBERING_OR_MAKING_DECISIONS_DIFFICULTY: NO
ADLS_ACUITY_SCORE: 23
ADLS_ACUITY_SCORE: 22
DRESSING/BATHING_DIFFICULTY: NO
ADLS_ACUITY_SCORE: 23
WHICH_OF_THE_ABOVE_FUNCTIONAL_RISKS_HAD_A_RECENT_ONSET_OR_CHANGE?: AMBULATION;TRANSFERRING;TOILETING
TOILETING_ISSUES: YES
ADLS_ACUITY_SCORE: 23
ADLS_ACUITY_SCORE: 18
ADLS_ACUITY_SCORE: 22
ADLS_ACUITY_SCORE: 22
DOING_ERRANDS_INDEPENDENTLY_DIFFICULTY: YES
FALL_HISTORY_WITHIN_LAST_SIX_MONTHS: NO
ADLS_ACUITY_SCORE: 18
TOILETING_ASSISTANCE: TOILETING DIFFICULTY, ASSISTANCE 1 PERSON
WALKING_OR_CLIMBING_STAIRS_DIFFICULTY: YES

## 2021-01-01 ASSESSMENT — ENCOUNTER SYMPTOMS
BLOOD IN STOOL: 1
ABDOMINAL PAIN: 0
DIARRHEA: 1
WEAKNESS: 1
HEADACHES: 0
DIZZINESS: 0
VOMITING: 1
LIGHT-HEADEDNESS: 0
BLOOD IN STOOL: 1

## 2021-03-03 PROBLEM — F10.21 ALCOHOL DEPENDENCE IN REMISSION (H): Status: ACTIVE | Noted: 2021-01-01

## 2021-03-03 NOTE — PROGRESS NOTES
I have not seen this pt for over 1 year.  Therapy plan discontinued. If required can be done through pcp or other team.  Juli Smith PA-C 3/3/2021 9:59 AM

## 2021-03-03 NOTE — PATIENT INSTRUCTIONS
Hand surgeon for dupuytren contracture:  Lauren Early -[phone].      Please call [place] [facility] at [phone] to schedule your CT scan to look for source of urinary tract bleeding.

## 2021-03-03 NOTE — PROGRESS NOTES
"    Assessment & Plan     Gross hematuria  Check for infection  If UA negative for infection, then get CT   If that is negative, then refer to urology for consideration of cystoscopy   - UA with Microscopic reflex to Culture  - CT Abdomen w Contrast; Future    Dupuytren contracture  He could see Dr. Early about this     Alcohol dependence in remission (H)  Again, counseled on this     Alcoholic cirrhosis of liver with ascites (H)  ascites and edema symptoms seem to be improving.   I suppose we could see how he does with an interval off diuretics         30 minutes spent on the date of the encounter doing chart review, history and exam, documentation and further activities as noted above       BMI:   Estimated body mass index is 29.69 kg/m  as calculated from the following:    Height as of 12/7/20: 1.854 m (6' 1\").    Weight as of 9/14/20: 102.1 kg (225 lb).           Return in about 3 months (around 6/3/2021) for recheck.   Patient instructed to return to clinic or contact us sooner if symptoms worsen or new symptoms develop.     Main Hardy MD  [facility] [place] is a 75 year old who presents for the following health issues     HPI     Follow up  Had episode of hematuria last night no associated pain  He has duputryen's contractures in both hands and wants to know what to do about them  He stopped his diuretics because his swelling improved and he has not had abdominal distention requiring paracentesis   He tell me he cut back on drinking   Review of Systems         Objective    BP (!) 146/87 (BP Location: Left arm, Patient Position: Sitting, Cuff Size: Adult Regular)   Pulse 74   Temp 97.7  F (36.5  C) (Temporal)   SpO2 96%   There is no height or weight on file to calculate BMI.  Physical Exam   Well appearing no distress, improved from previous exams  No peripheral edema, no abdominal distention                 "

## 2021-03-03 NOTE — LETTER
March 4, 2021      Antonio Ramirez  [address]   [place] [address]        Dear ,    The following letter pertains to your most recent diagnostic tests:     As we discussed by phone, the urine test indicates preliminary evidence for a urinary tract infection.  Take the antibiotics sent to your pharmacy as prescribed to treat this.  I will follow up with the urine culture (gold standard test for infection) result when it is available.     Resulted Orders   UA with Microscopic reflex to Culture   Result Value Ref Range    Color Urine Charlene     Appearance Urine Cloudy     Glucose Urine Negative NEG^Negative mg/dL    Bilirubin Urine Negative NEG^Negative    Ketones Urine Negative NEG^Negative mg/dL    Specific Gravity Urine 1.020 1.003 - 1.035    pH Urine 7.0 5.0 - 7.0 pH    Protein Albumin Urine 100 (A) NEG^Negative mg/dL    Urobilinogen Urine 1.0 0.2 - 1.0 EU/dL    Nitrite Urine Negative NEG^Negative    Blood Urine Large (A) NEG^Negative    Leukocyte Esterase Urine Small (A) NEG^Negative    Source Midstream Urine     WBC Urine >100 (A) OTO5^0 - 5 /HPF    RBC Urine >100 (A) OTO2^O - 2 /HPF    Squamous Epithelial /LPF Urine Moderate (A) FEW^Few /LPF    Bacteria Urine Many (A) NEG^Negative /HPF       If you have any questions or concerns, please call the clinic at the number listed above.       Sincerely,      Main Hardy MD        shailesh

## 2021-03-05 NOTE — RESULT ENCOUNTER NOTE
Problem: Adult Inpatient Plan of Care  Goal: Plan of Care Review  Outcome: Ongoing (interventions implemented as appropriate)  POC reviewed with Pt. Pt aao x 4, telemetry monitor in place. Pt c/o back pain, PRN pain med given.  Emesis x 1. Lidocaine patch applied to  lower back. Safety precautions maintained. Bed in low position wheels locked. Side rails up x 2. Call light within reach. Pt free of falls.        The following letter pertains to your most recent diagnostic tests:    The urine culture actually did NOT demonstrate a bacterial urinary tract infection.  Therefore, the antibiotic Bactrim DS is not necessary and you can stop that medication.  Because the urine culture is negative, we don't know where the blood in your urine stream is coming from.  Therefore, I think we should proceed with scheduling the CT scan as previously discussed.  Please call [facility] [facility] at [phone] to schedule your CT scan.         Sincerely,    Dr. Hardy

## 2021-03-05 NOTE — RESULT ENCOUNTER NOTE
Could you please call [facility] and summarize my Lab result note that he can stop bactrim and that he should schedule the CT scan.  I am not sure he reliably checks his MyChart.  Thanks

## 2021-03-08 NOTE — TELEPHONE ENCOUNTER
Pt called to report absence of hematuria in the last 3 days and would like to know if he should still  proceed with CT scan. Please advice.

## 2021-03-09 NOTE — TELEPHONE ENCOUNTER
I would recommend the CT to make sure that the bleeding is not from a kidney stone or urinary tract neoplasm (cancer).

## 2021-03-10 NOTE — LETTER
March 11, 2021      Antonio KINGS Ramirez  [address]   [place] [address]        Dear JessicaAshley,    The following letter pertains to your most recent diagnostic tests:     The CT shows kidney stones which may be the source of the blood in your urine, but it also shows some thickening of your bladder wall.  I think this should be checked out with a cystoscopy procedure.  This is a camera test performed by a urologist.  This can exclude bladder tumors as a potential cause for your bleeding.  I recommend a Dr. Ted Mercado or Dr. Patricio Holt.  Please call [facility] [phone] to schedule an appointment with either of those docs.       Resulted Orders   CT Urogram wo & w Contrast    Narrative    CT UROGRAM WITHOUT AND WITH CONTRAST 3/10/2021 12:25 PM    CLINICAL HISTORY: Abdominal pain, acute, nonlocalized; gross hematuria  evaluate for upper tract source.    TECHNIQUE: CT scan of the abdomen and pelvis was performed before and  following injection of IV contrast. Multiplanar reformats were  obtained. Dose reduction techniques were used.    CONTRAST: 110mL Isovue-370    COMPARISON: None.    FINDINGS:   LOWER CHEST: Small left and moderate right pleural effusions. Severe  coronary artery calcification, status post CABG. Paraesophageal  varices.    HEPATOBILIARY: Cirrhotic appearance of the liver. 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 No liver lesions. Cholecystectomy.    PANCREAS: There are calcifications in the head of the pancreas. No  signs of acute pancreatitis.    SPLEEN: Normal.    ADRENAL GLANDS: Normal.    KIDNEYS/BLADDER: Moderate left renal atrophy. There are nonobstructing  left renal calculi measuring up to 1 cm. No hydronephrosis or ureteral  calculus. Visualization of the pelvis is limited by streak artifact  from bilateral hip arthroplasties. Mild diffuse bladder wall  thickening. No definite bladder lesions.    BOWEL:  Colonic diverticulosis without diverticulitis.    PELVIC ORGANS: Radiotherapy seeds in the prostate gland.    ADDITIONAL FINDINGS: Small amount of ascites. There are upper  abdominal varices and a spontaneous splenorenal shunt.    MUSCULOSKELETAL: Bilateral hip arthroplasties. Marked degenerative  changes throughout the spine. No bone lesions.      Impression    IMPRESSION:   1.  Multiple nonobstructing left renal calculi.  2.  Mild diffuse bladder wall thickening may be secondary to bladder  outlet obstruction or cystitis.  3.  Otherwise negative kidneys, ureters, and bladder.  4.  Radiotherapy seeds in the prostate gland. No evidence of  metastatic disease.  5.  Cirrhosis with signs of portal hypertension, including small  amount of ascites and upper abdominal and esophageal varices.  6.  Moderate right and small left pleural effusions.    DALI PANIAGUA MD       If you have any questions or concerns, please call the clinic at the number listed above.       Sincerely,      Main Hardy MD    shailesh

## 2021-03-11 NOTE — TELEPHONE ENCOUNTER
[facility] Call Center    Phone Message    May a detailed message be left on voicemail: yes     Reason for Call: Other: Pt has a referral for gross hematuria.  Please follow up for scheduling.  Thank you!     Action Taken: Message routed to:  Other: [facility]    Travel Screening: Not Applicable

## 2021-03-11 NOTE — RESULT ENCOUNTER NOTE
The following letter pertains to your most recent diagnostic tests:    The CT shows kidney stones which may be the source of the blood in your urine, but it also shows some thickening of your bladder wall.  I think this should be checked out with a cystoscopy procedure.  This is a camera test performed by a urologist.  This can exclude bladder tumors as a potential cause for your bleeding.  I recommend a Dr. Ted Mercado or Dr. Patricio Holt.  Please call [facility] [phone] to schedule an appointment with either of those docs.        Sincerely,    Dr. Hardy

## 2021-03-16 PROBLEM — K92.1 GASTROINTESTINAL HEMORRHAGE WITH MELENA: Status: ACTIVE | Noted: 2021-01-01

## 2021-03-16 PROBLEM — R94.31 PROLONGED QT INTERVAL: Status: ACTIVE | Noted: 2021-01-01

## 2021-03-17 PROBLEM — K92.2 GI BLEED: Status: ACTIVE | Noted: 2021-01-01

## 2021-03-17 NOTE — ED NOTES
Bed: ED10  Expected date: 3/16/21  Expected time: 10:18 PM  Means of arrival:   Comments:  Opal 2 75 M DONI FERNANDEZ

## 2021-03-17 NOTE — H&P
[facility]    History and Physical - Hospitalist Service       Date of Admission:  3/16/2021    Assessment & Plan   Antonio Ramirez is a 75 year old male admitted on 3/16/2021. He presents with approximately 2 to 3 days of melenic stool as well as nausea and vomiting.    Acute GI hemorrhage with blood loss anemia: Hemoglobin in the 10 range, essentially stable from last September.  Reports approximately 3 days of melenic stool as well as gross hematuria.  Believes he has had some dark brown emesis as well.  Recent CT imaging demonstrated upper abdomen including esophageal varices, though this historically have not been noted on screening EGDs.  During April admission 4/16 upper endoscopy with AVMs noted in the stomach and jejunum, 4/17 colonoscopy with AVM in colon requiring argon plasma treatment, Endo Clip and epi injection.  -Gastroenterology consulted.  Patient follows with [facility] gastroenterology most recently.  -Protonix infusion  -Octreotide infusion given varices noted on recent CT imaging of the abdomen  -Ceftriaxone 1 g every 12 hours for prophylaxis given history of cirrhosis with ascites and GI bleed  -Anticoagulation to be discontinued.  History of pulmonary embolism with positive lupus inhibitor, though also with cirrhosis and history of GI bleeds requiring blood transfusions.  See most recent consultation notes from [place] [facility] 4/24/2020 during hospitalization for GI bleed regarding discontinuation of anticoagulation if recurrent bleeding event.  -N.p.o. anticipating EGD in a.m.; if no source noted on upper endoscopy, will likely require prep and subsequent colonoscopy  -Every 6 hour hemoglobin checks  -Patient consented for blood products by myself in the emergency department    Gross hematuria: Patient reports gross hematuria.  History of prostate cancer status post brachytherapy.  History of nonobstructive renal stones with recent CT imaging.  Thought to be  secondary to possible endothelial injury with renal stones in the setting of chronic anticoagulation.  -Monitor intake and output  -Anticoagulation discontinued as above  -[place] u[facility] consulted; this was being arranged in the outpatient setting prior to current hospitalization.  Will likely require a cystoscopy at some point in the future  -Bladder scan, straight cath as needed for urinary retention    CKD stage III: Baseline creatinine in the 1.5 range, though currently 1.09.  Question if these prior renal panels were actually associated with some cardiorenal syndrome with his severe pulmonary hypertension.  More historical creatinine elevations in the 1.6 range during last hospitalization was also at the time when patient was severely edematous, which is no longer the case.  -Trend CMP.     Alcohol abuse history:   Alcoholic cirrhosis of the liver.  Patient reports that he continues to drink 3 beverages per day.  Negative ethanol level on admission.  Somewhat tangential in his history.  Will be discussing his emesis and he will revert to talking about hematuria.  Tells me that he was started on Xarelto, and when I mention Xarelto and ask when this converted to Eliquis, corrects me to tell me that it was Eliquis.  This happens in a somewhat similar fashion at least twice during our interview.  At last admission was recommended for TCU  -Occupational Therapy consulted for cognitive assessment  -Monitor for evidence of alcohol withdrawal  -2 g sodium diet when advanced from n.p.o. status  -Holding prior to admission spironolactone, torsemide given GI bleed while awaiting hemodynamic stability    Severe pulmonary hypertension:  Moderate aortic stenosis: Harsh systolic murmur across precordium.  Last TTE in September 2019 with valve area 1.5 cm at that time  -TTE   -cardiac telemetry    Coronary artery disease: Status post coronary artery bypass grafting in 2007, 2018.  Chronic systolic heart failure not currently  in acute exacerbation: Severe hypokinesis of distal inferior wall with ejection fraction in the 40 to 45% range as of September 2019 echocardiogram  -prior to admission aspirin 81 mg on hold; anticoagulation discontinued  -Note statin intolerance  Paroxysmal atrial fibrillation:  -Anticoagulation to be discontinued  -Cardiac telemetry    Prolonged QT: Noted in the emergency department following Zofran administered by EMS  -Avoid QT prolonging agents  -Repeat EKG in a.m.  -Cardiac telemetry    History of diffuse large B-cell lymphoma:   -Continue outpatient monitoring through oncology           Diet: NPO for Medical/Clinical Reasons Except for: No Exceptions    DVT Prophylaxis: Pneumatic Compression Devices  Levin Catheter: not present  Code Status:  Full code         Disposition Plan   Expected discharge: 2 - 3 days, recommended to Home versus TCU once Bleeding stopped, safe disposition plan in place.  Has been recommended for TCU in the past, though this was after more prolonged hospitalization.  Entered: Von Gomez MD 03/16/2021, 11:47 PM     The patient's care was discussed with the Patient and Bedside nurse, Dr. Alonzo in the emergency department.    Von Gomez MD  [facility]  Contact information available via [facility] Paging/Directory      ______________________________________________________________________    Chief Complaint   Nausea and vomiting, melenic stool    History is obtained from the patient, chart review, discussion with Dr. Alonzo in the emergency department, review of outside records including MRSA from left stump wound cultured in November 2015    History of Present Illness   Antonio Ramirez is a 75 year old male who presents to the emergency department with 2 to 3 days of melenic stool as well as nausea and vomiting, recent hematuria.  Patient provides a history, though is somewhat tangential in his description of history and presentation.  As above, he  "will flip between describing his emesis and his hematuria, intermittently describes historical medications versus medications he is currently taking.  As such, history may not be entirely reliable.    Approximately 2 weeks ago, patient developed hematuria.  Was seen by his primary care provider for this, referred to urology.  He also completed a CT scan at that time to evaluate for renal stones.  He has not yet followed up with urology, though CT scan was performed without obstructive renal stone.  Was notable for some esophageal varices on CT imaging which had not been noted on prior EGD screenings.    Over the past 3 days or so, patient has had dark black stool as well as episodes of nausea and vomiting.  He believes that his emesis is dark in color as well, not bright red.  When pressed on this, he tells me that he will defecate and vomit in the same \"urinal,\" and so has difficulty telling what is what.  Patient has a history of alcoholic cirrhosis of the liver, though no prior history of esophageal varices by EGD screenings.  Does have a history of rectal varices.  He tells me that he continues to drink 3 alcoholic beverages per night.    Patient with a prolonged and complicated hospitalization in April 2020 for GI bleed with melenic stool.  Had worsened creatinine at that time as well as anasarca.  Required IR intervention with no success, EGD and colonoscopy.  Noted to have AVMs involving the stomach, jejunum, as well as AVM versus Dula Mainor lesion of the colon requiring argon plasma treatment as well as Endo Clip and epi injection.  Multiple PillCam studies in the past without clear source of bleeding.  Despite this, he remains on anticoagulation given his history of atrial fibrillation and lupus anticoagulant with pulmonary embolism history.  At that last admission, decision was made to resume anticoagulation 1 week after resolution of bleeding, though anticoagulation to be discontinued if ever to have " "recurrence of bleeding.  Patient historically had been on warfarin anticoagulation, though there was concerns with patient's adherence to medical regimen and variability in INR resulting in bleeding.  For this reason, he was switched to NOAC anticoagulation, despite this being less well studied in antiphospholipid antibody syndrome.    No fevers, no chills.  His last episode of emesis was at approximately 8 PM.  He has no significant abdominal pain.  When asked what brought him to the emergency department tonight as compared to 2 or 3 days ago, he tells me that a nurse told him a year ago that if he started to have blood in his stools, that he should \"get this checked out.\"  He did not have a particular symptom that caused him to present tonight.  In the emergency department, it was reported that his wife was tired of cleaning up after him with his episodes of melena and emesis.  He tells me that this is not necessarily the case, though she was tired of cleaning up after him.  Again, he tells me he presented of his own accord as he recalled the advice given to him by nursing staff in the past.    Patient does not feel lightheaded.  His hemoglobin is in the 10 range, and is actually stable as compared to prior values.  Stool is grossly melenic on exam in the emergency department, also occult positive.    Review of Systems    The 10 point Review of Systems is negative other than noted in the HPI or here.  No fevers or chills    Past Medical History    I have reviewed this patient's medical history and updated it with pertinent information if needed.   Past Medical History:   Diagnosis Date     Alcoholism (H)      Amputated left leg (H)      Anemia      Anticardiolipin antibody syndrome (H)      Antiphospholipid antibody syndrome (H)      Antiplatelet or antithrombotic long-term use      Aortic root dilatation (H)      Aortic stenosis      Arthritis      Balance problem      Coronary artery disease     CABG 2007: SVG " to LAD, SVG to diagonal, SVG to OM; cardiac cath 5/17/18: thrombectomy for restenosis of stents-sent for urgent CABG, cath 5/14/2007: GRECIA x2 to LAD, Grecia to diagonal     Gastro-oesophageal reflux disease      Heart attack (H) 2007    apparently arrested, cardiac X5     Hiatal hernia      Hypercholesterolemia      Hypertension      Ischemic cardiomyopathy      Left foot drop      Liver disease     alcoholic liver disease, alcoholic cirrohsosis, portal hypertension     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)     ?When diagnosed, patient not aware of this     Moderate mitral regurgitation      Monoclonal gammopathy      Neuropathy      Noninfectious ileitis     diverticulitis of colon     Nonrheumatic tricuspid valve regurgitation      Paroxysmal atrial fibrillation (H)      PE (pulmonary embolism) 2006    saddle emboli 2006     Prostate cancer (H) 2000    Treated with radiation (seed implants in 2000) -- no problems since     Pulmonary hypertension (H)      Renal disease     kidney stones     Syncope      Thrombocytopenia (H)      Urethral stricture      Venous thrombosis     IVC filter and lysis procedures 2007       Past Surgical History   I have reviewed this patient's surgical history and updated it with pertinent information if needed.  Past Surgical History:   Procedure Laterality Date     AMPUTATE LEG BELOW KNEE Left 04/2014    related to gangrene, started as foot ulcer related to neuropathy     AMPUTATE LEG BELOW KNEE Left 12/4/2017    Procedure: AMPUTATE LEG BELOW KNEE;  Exploration of Left Leg Below Knee Amputation Stump with Ligation of Bleeders.;  Surgeon: Leandro Arreola MD;  Location:  OR     APPENDECTOMY       APPLY WOUND VAC Left 12/6/2017    Procedure: APPLY WOUND VAC;;  Surgeon: Saima Howard MD;  Location:  SD     ARTHROPLASTY HIP Right 11/27/2018    Procedure: RIGHT TOTAL HIP ARTHROPLASTY;  Surgeon: Saima Howard MD;  Location:  OR     ARTHROPLASTY KNEE Right 9/19/2014     Procedure: ARTHROPLASTY KNEE;  Surgeon: Saima oHward MD;  Location:  OR     CARDIAC SURGERY      CABG     CHOLECYSTECTOMY       COLONOSCOPY       COLONOSCOPY N/A 4/1/2020    Procedure: COLONOSCOPY;  Surgeon: Emeka Freeman DO;  Location:  GI     COMBINED CYSTOSCOPY, RETROGRADES, EXCHANGE STENT URETER(S) Left 7/24/2018    Procedure: COMBINED CYSTOSCOPY, RETROGRADES, EXCHANGE STENT URETER(S);  CYSTOSCOPY, BILATERAL RETROGRADES, BILATERAL URETERAL STENT EXCHANGE ;  Surgeon: Matt Cervantes MD;  Location:  OR     CRANIOTOMY Right 4/7/2018    Procedure: CRANIOTOMY;  Right Craniotomy For Subdural Hematoma;  Surgeon: Jono Issa MD;  Location:  OR     CYSTOSCOPY, DILATE URETHRA, COMBINED N/A 10/12/2016    Procedure: COMBINED CYSTOSCOPY, DILATE URETHRA;  Surgeon: Dimitry Bello MD;  Location:  OR     CYSTOSCOPY, REMOVE STENT(S), COMBINED Right 7/24/2018    Procedure: COMBINED CYSTOSCOPY, REMOVE STENT(S);;  Surgeon: Jose Hunter MD;  Location:  OR     ENDOSCOPIC STRIPPING VEIN(S)       ENTEROSCOPY SMALL BOWEL N/A 4/8/2020    Procedure: ENTEROSCOPY;  Surgeon: Katherin Boyd MD;  Location:  OR     esophagogastroduodenoscopy       ESOPHAGOSCOPY, GASTROSCOPY, DUODENOSCOPY (EGD), COMBINED N/A 3/11/2019    Procedure: COMBINED ESOPHAGOSCOPY, GASTROSCOPY, DUODENOSCOPY (EGD), BIOPSY SINGLE OR MULTIPLE;  Surgeon: Katherin Boyd MD;  Location:  GI     ESOPHAGOSCOPY, GASTROSCOPY, DUODENOSCOPY (EGD), COMBINED N/A 9/27/2019    Procedure: ESOPHAGOGASTRODUODENOSCOPY, WITH FOREIGN BODY REMOVAL;  Surgeon: Raegan Mckeon MD;  Location:  GI     ESOPHAGOSCOPY, GASTROSCOPY, DUODENOSCOPY (EGD), COMBINED N/A 4/1/2020    Procedure: ESOPHAGOGASTRODUODENOSCOPY (EGD);  Surgeon: Emeka Freeman DO;  Location:  GI     ESOPHAGOSCOPY, GASTROSCOPY, DUODENOSCOPY (EGD), COMBINED N/A 4/9/2020    Procedure: Esophagoscopy, gastroscopy, duodenoscopy (EGD),  combined;  Surgeon: Katherin Boyd MD;  Location:  GI     EYE SURGERY      cataracts     GENITOURINARY SURGERY      Prostate Seen Implants     HERNIA REPAIR      Umbilical     INCISION AND DRAINAGE LOWER EXTREMITY, COMBINED Left 12/1/2017    Procedure: COMBINED INCISION AND DRAINAGE LOWER EXTREMITY;  INCISION AND DRAINAGE OF LEFT BELOW KNEE AMPUTATION ABSCESS, WOUND VAC PLACEMENT;  Surgeon: Saima Howard MD;  Location:  OR     IR IVC FILTER PLACEMENT       IR VISCERAL ANGIOGRAM  4/14/2020     IRRIGATION AND DEBRIDEMENT LOWER EXTREMITY, COMBINED Left 12/6/2017    Procedure: COMBINED IRRIGATION AND DEBRIDEMENT LOWER EXTREMITY;  IRRIGATION AND DEBRIDEMENT OF LEFT BELOW KNEE AMPUTATION SITE WITH WOUND VAC REPLACEMENT;  Surgeon: Saima Howard MD;  Location:  SD     IRRIGATION AND DEBRIDEMENT LOWER EXTREMITY, COMBINED Left 12/8/2017    Procedure: COMBINED IRRIGATION AND DEBRIDEMENT LOWER EXTREMITY;  IRRIGATION AND DEBRIDEMENT OF LEFT BELOW THE KNEE AMPUTATION AND SHORTENING OF THE TIBIA WITH WOUND CLOSURE;  Surgeon: Saima Howard MD;  Location:  OR     LASER HOLMIUM LITHOTRIPSY URETER(S), INSERT STENT, COMBINED Bilateral 6/28/2018    Procedure: COMBINED CYSTOSCOPY, URETEROSCOPY, LASER HOLMIUM LITHOTRIPSY URETER(S), INSERT STENT;  CYSTOSCOPY, BILATERAL URETEROSCOPY,  HOLMIUM LASER LITHOTRIPSY, BILATERAL STENT PLACEMENT, STONE BASKETING;  Surgeon: Jose Hunter MD;  Location:  OR     LASER HOLMIUM LITHOTRIPSY URETER(S), INSERT STENT, COMBINED Left 8/14/2018    Procedure: COMBINED CYSTOSCOPY, URETEROSCOPY, LASER HOLMIUM LITHOTRIPSY URETER(S), INSERT STENT;  CYSTOSCOPY, LEFT URETEROSCOPY, LEFT LASER HOLMIUM LITHOTRIPSY URETER(S) REMOVAL BILATERAL STENTS;  Surgeon: Jose Hunter MD;  Location:  OR     ORTHOPEDIC SURGERY      (R) knee scope     ORTHOPEDIC SURGERY      total hip      ORTHOPEDIC SURGERY      elbow surgery       Social History   I have reviewed this patient's  "social history and updated it with pertinent information if needed.  Social History     Tobacco Use     Smoking status: Never Smoker     Smokeless tobacco: Never Used   Substance Use Topics     Alcohol use: Yes     Frequency:  Daily     Drinks per session:  Patient reports 3 drinks per night     Binge frequency: Never     Comment: 10-12 drinks a week historically, currently reports approximately 21 drinks per week     Drug use: No       Family History   I have reviewed this patient's family history and updated it with pertinent information if needed.  Family History   Problem Relation Age of Onset     Lung Cancer Mother      Heart Failure Father          age 87       Prior to Admission Medications   Prior to Admission Medications   Prescriptions Last Dose Informant Patient Reported? Taking?   apixaban ANTICOAGULANT (ELIQUIS) 2.5 MG tablet   No No   Sig: Take 1 tablet (2.5 mg) by mouth daily   aspirin 81 MG EC tablet   Yes No   Sig: Take 81 mg by mouth daily   gabapentin (NEURONTIN) 600 MG tablet   No No   Sig: TAKE 1 TABLET(600 MG) BY MOUTH TWICE DAILY   spironolactone (ALDACTONE) 100 MG tablet   No No   Sig: TAKE 1 TABLET(100 MG) BY MOUTH DAILY   Patient not taking: Reported on 3/3/2021   sulfamethoxazole-trimethoprim (BACTRIM DS) 800-160 MG tablet   No No   Sig: Take 1 tablet by mouth 2 times daily   torsemide (DEMADEX) 20 MG tablet  Self No No   Sig: Take 2 tablets (40 mg) by mouth every morning   Patient not taking: Reported on 3/3/2021      Facility-Administered Medications: None     Allergies   Allergies   Allergen Reactions     Blood Transfusion Related (Informational Only) Other (See Comments)     Patient has a history of a clinically significant antibody against RBC antigens.  A delay in compatible RBCs may occur.     Kdc:Minocycline+[place] [facility] GI Disturbance     16-PT IS NOT AWARE OF THIS REACTION     Ciprofloxacin Itching     Severe itching \"like ants were crawling\"     Hmg-Coa-R " "Inhibitors Other (See Comments)     Rhabdomyolysis occurred within a couple days  Rhabdomyolysis       Physical Exam   Vital Signs: Temp: 97.8  F (36.6  C) Temp src: Oral BP: 126/83 Pulse: 68   Resp: 25 SpO2: 98 %      Weight: 200 lbs 0 oz    General Appearance: Generally well-appearing 75-year-old male resting comfortably in bed.  Eyes: No scleral icterus or injection   HEENT: Patient with a scab overlying left forehead.  He tells me that this is from \"picking\".  Prior right craniotomy scar  Respiratory: breath sounds are clear bilaterally to auscultation no wheezes or crackles  Cardiovascular: Irregular rate and rhythm.  Heart rate currently in the 60s range.  Harsh systolic murmur across precordium and best appreciated at upper sternal borders  GI: Abdomen soft, nondistended.  Mild tenderness to palpation most notable in right lower quadrant, suprapubic.  Bowel sounds present  Lymph/Hematologic: No lower extremity edema  Skin: No appreciable jaundice, though patient does appear more pallorous than would be expected for a hemoglobin of 10  Musculoskeletal: Moderate diffuse muscular wasting appreciated, most notable in distal upper extremities with some subcutaneous fat wasting of chest and upper extremities as well.  Neurologic: No fasciculations or tremor.  Somewhat tangential in history, though alert, conversant, mental status grossly intact  Psychiatric: Pleasant, normal affect    Data   Data reviewed today: I reviewed all medications, new labs and imaging results over the last 24 hours. I personally reviewed chest x-ray with sternotomy clips, right-sided pleural effusion.    Recent Labs   Lab 03/16/21  2258   WBC 4.4   HGB 10.3*   MCV 98   *      POTASSIUM 4.6   CHLORIDE 108   CO2 26   BUN 41*   CR 1.09   ANIONGAP 7   BENNIE 9.6   GLC 84   ALBUMIN 3.1*   PROTTOTAL 6.6*   BILITOTAL 1.5*   ALKPHOS 97   ALT 21   AST 36     "

## 2021-03-17 NOTE — PLAN OF CARE
OT:Orders received and chart reviewed. Antonio Ramirez is a 75 year old male admitted on 3/16/2021. He presents with approximately 2 to 3 days of melenic stool as well as nausea and vomiting.     Attempted evaluation. Pt.declined at this time, did not understand the point of it;provided education in occupational therapy,specifically cognitive assessment(MD orders for cognitive evaluation). Pt.continued to declined, had many questions--specifically medical questions--nursing aware.Will re-attempt  3/18, hopeful pt. will participate w/further encouragement, and once he gets his medical questions answered.   (4) excellent

## 2021-03-17 NOTE — ED TRIAGE NOTES
Pt presents to ED via EMS with c/o bloody stools and vomiting for the last 24 hours. Last stool was watery, tarry black and red in color. Pt has also been vomiting. Pt is on Eliquis. Unsure if there was blood in his emesis, with vomiting 3-4x in the last 24 hours. Pt denies abdominal pain.     Pt has L BKA. History of cardiac bypass surgery.     Hx ETOH use. Pt estimates 3 drinks of vodka per day. Last drink was last night 03/15/21.    RN in room for rectal exam with ERP.    Pt updated on plan of care and verbalized understanding.

## 2021-03-17 NOTE — CONSULTS
[facility] Consultation by [facility]    Antonio Ramirez MRN# [id number]   Age: 75 year old YOB: 1945     Date of Admission:  3/16/2021    Reason for consult: Gross hematuria       Requesting PA/MD: Dr. Gomez       Level of consult: Consult, follow and place orders           Assessment and Plan:   Assessment:   GI bleed  ETOH  Cirrhosis  Eliquis use  Hx prostate ca, s/p brachy 2000  Nocturia  Hx of renal stones  Micro hematuria, possible gross hematuria      Plan:   CT Urogram reviewed, lower pole renal stones  Send urine cytology  Non-urgent and at this point will not plan for cysto in OR. Will arrange outpatient cysto in our office.     Will sign off. Please call if urologic questions arise or gross hematuria is significant.       Rosie Lindquist PA-C  [facility]  [phone]               Chief Complaint:   Blood in stool     History is obtained from the patient and EMR.         History of Present Illness:   This patient is a 75 year old male admitted with GI bleed. Hx of ETOH cirrhosis. Well-known to GI team. Eliquis is on hold. He noted painless hematuria on 3/3/21. He was also told there was blood in his sample on 3/3/21 with negative UC. He is not sure if he has had further hematuria. RN has not noted gross hematuria yet this AM. No clots that he is aware of. Has had nocturia for many years. Hx of prostate cancer in 2000 and several stone procedures. All done with [facility] and wishing to transfer care.     NPO for EGD later today. No dysuria. Thinks he missed the urinal this AM. No hematuria noted. He is unsure of his last PSA level.               Past Medical History:     Past Medical History:   Diagnosis Date     Alcoholism (H)      Amputated left leg (H)      Anemia      Anticardiolipin antibody syndrome (H)      Antiphospholipid antibody syndrome (H)      Antiplatelet or antithrombotic long-term use      Aortic root dilatation (H)      Aortic stenosis       Arthritis      Balance problem      Coronary artery disease     CABG 2007: SVG to LAD, SVG to diagonal, SVG to OM; cardiac cath 5/17/18: thrombectomy for restenosis of stents-sent for urgent CABG, cath 5/14/2007: GRECIA x2 to LAD, [place] to diagonal     Gastro-oesophageal reflux disease      Heart attack (H) 2007    apparently arrested, cardiac X5     Hiatal hernia      Hypercholesterolemia      Hypertension      Ischemic cardiomyopathy      Left foot drop      Liver disease     alcoholic liver disease, alcoholic cirrohsosis, portal hypertension     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)     ?When diagnosed, patient not aware of this     Moderate mitral regurgitation      Monoclonal gammopathy      Neuropathy      Noninfectious ileitis     diverticulitis of colon     Nonrheumatic tricuspid valve regurgitation      Paroxysmal atrial fibrillation (H)      PE (pulmonary embolism) 2006    saddle emboli 2006     Prostate cancer (H) 2000    Treated with radiation (seed implants in 2000) -- no problems since     Pulmonary hypertension (H)      Renal disease     kidney stones     Syncope      Thrombocytopenia (H)      Urethral stricture      Venous thrombosis     IVC filter and lysis procedures 2007             Past Surgical History:     Past Surgical History:   Procedure Laterality Date     AMPUTATE LEG BELOW KNEE Left 04/2014    related to gangrene, started as foot ulcer related to neuropathy     AMPUTATE LEG BELOW KNEE Left 12/4/2017    Procedure: AMPUTATE LEG BELOW KNEE;  Exploration of Left Leg Below Knee Amputation Stump with Ligation of Bleeders.;  Surgeon: Leandro Arreola MD;  Location:  OR     APPENDECTOMY       APPLY WOUND VAC Left 12/6/2017    Procedure: APPLY WOUND VAC;;  Surgeon: Saima Howard MD;  Location:  SD     ARTHROPLASTY HIP Right 11/27/2018    Procedure: RIGHT TOTAL HIP ARTHROPLASTY;  Surgeon: Saima Howard MD;  Location:  OR     ARTHROPLASTY KNEE Right 9/19/2014    Procedure:  ARTHROPLASTY KNEE;  Surgeon: Saima Howard MD;  Location:  OR     CARDIAC SURGERY      CABG     CHOLECYSTECTOMY       COLONOSCOPY       COLONOSCOPY N/A 4/1/2020    Procedure: COLONOSCOPY;  Surgeon: Emeka Freeman DO;  Location:  GI     COMBINED CYSTOSCOPY, RETROGRADES, EXCHANGE STENT URETER(S) Left 7/24/2018    Procedure: COMBINED CYSTOSCOPY, RETROGRADES, EXCHANGE STENT URETER(S);  CYSTOSCOPY, BILATERAL RETROGRADES, BILATERAL URETERAL STENT EXCHANGE ;  Surgeon: Matt Cervantes MD;  Location:  OR     CRANIOTOMY Right 4/7/2018    Procedure: CRANIOTOMY;  Right Craniotomy For Subdural Hematoma;  Surgeon: Jono Issa MD;  Location:  OR     CYSTOSCOPY, DILATE URETHRA, COMBINED N/A 10/12/2016    Procedure: COMBINED CYSTOSCOPY, DILATE URETHRA;  Surgeon: Dimitry Bello MD;  Location:  OR     CYSTOSCOPY, REMOVE STENT(S), COMBINED Right 7/24/2018    Procedure: COMBINED CYSTOSCOPY, REMOVE STENT(S);;  Surgeon: Jose Hunter MD;  Location:  OR     ENDOSCOPIC STRIPPING VEIN(S)       ENTEROSCOPY SMALL BOWEL N/A 4/8/2020    Procedure: ENTEROSCOPY;  Surgeon: Katherin Boyd MD;  Location:  OR     esophagogastroduodenoscopy       ESOPHAGOSCOPY, GASTROSCOPY, DUODENOSCOPY (EGD), COMBINED N/A 3/11/2019    Procedure: COMBINED ESOPHAGOSCOPY, GASTROSCOPY, DUODENOSCOPY (EGD), BIOPSY SINGLE OR MULTIPLE;  Surgeon: Katherin Boyd MD;  Location:  GI     ESOPHAGOSCOPY, GASTROSCOPY, DUODENOSCOPY (EGD), COMBINED N/A 9/27/2019    Procedure: ESOPHAGOGASTRODUODENOSCOPY, WITH FOREIGN BODY REMOVAL;  Surgeon: Raegan Mckeon MD;  Location:  GI     ESOPHAGOSCOPY, GASTROSCOPY, DUODENOSCOPY (EGD), COMBINED N/A 4/1/2020    Procedure: ESOPHAGOGASTRODUODENOSCOPY (EGD);  Surgeon: Emeka Freeman DO;  Location:  GI     ESOPHAGOSCOPY, GASTROSCOPY, DUODENOSCOPY (EGD), COMBINED N/A 4/9/2020    Procedure: Esophagoscopy, gastroscopy, duodenoscopy (EGD), combined;  Surgeon:  Katherin Boyd MD;  Location:  GI     EYE SURGERY      cataracts     GENITOURINARY SURGERY      Prostate Seen Implants     HERNIA REPAIR      Umbilical     INCISION AND DRAINAGE LOWER EXTREMITY, COMBINED Left 12/1/2017    Procedure: COMBINED INCISION AND DRAINAGE LOWER EXTREMITY;  INCISION AND DRAINAGE OF LEFT BELOW KNEE AMPUTATION ABSCESS, WOUND VAC PLACEMENT;  Surgeon: Saima Howard MD;  Location:  OR     IR IVC FILTER PLACEMENT       IR VISCERAL ANGIOGRAM  4/14/2020     IRRIGATION AND DEBRIDEMENT LOWER EXTREMITY, COMBINED Left 12/6/2017    Procedure: COMBINED IRRIGATION AND DEBRIDEMENT LOWER EXTREMITY;  IRRIGATION AND DEBRIDEMENT OF LEFT BELOW KNEE AMPUTATION SITE WITH WOUND VAC REPLACEMENT;  Surgeon: Saima Howard MD;  Location:  SD     IRRIGATION AND DEBRIDEMENT LOWER EXTREMITY, COMBINED Left 12/8/2017    Procedure: COMBINED IRRIGATION AND DEBRIDEMENT LOWER EXTREMITY;  IRRIGATION AND DEBRIDEMENT OF LEFT BELOW THE KNEE AMPUTATION AND SHORTENING OF THE TIBIA WITH WOUND CLOSURE;  Surgeon: Saima Howard MD;  Location:  OR     LASER HOLMIUM LITHOTRIPSY URETER(S), INSERT STENT, COMBINED Bilateral 6/28/2018    Procedure: COMBINED CYSTOSCOPY, URETEROSCOPY, LASER HOLMIUM LITHOTRIPSY URETER(S), INSERT STENT;  CYSTOSCOPY, BILATERAL URETEROSCOPY,  HOLMIUM LASER LITHOTRIPSY, BILATERAL STENT PLACEMENT, STONE BASKETING;  Surgeon: Jose Hunter MD;  Location:  OR     LASER HOLMIUM LITHOTRIPSY URETER(S), INSERT STENT, COMBINED Left 8/14/2018    Procedure: COMBINED CYSTOSCOPY, URETEROSCOPY, LASER HOLMIUM LITHOTRIPSY URETER(S), INSERT STENT;  CYSTOSCOPY, LEFT URETEROSCOPY, LEFT LASER HOLMIUM LITHOTRIPSY URETER(S) REMOVAL BILATERAL STENTS;  Surgeon: Jose Hunter MD;  Location:  OR     ORTHOPEDIC SURGERY      (R) knee scope     ORTHOPEDIC SURGERY      total hip      ORTHOPEDIC SURGERY      elbow surgery             Social History:     Social History     Tobacco Use     Smoking  "status: Never Smoker     Smokeless tobacco: Never Used   Substance Use Topics     Alcohol use: Yes     Frequency: 4 or more times a week     Drinks per session: 1 or 2     Binge frequency: Never     Comment: 10-12 drinks a week             Family History:     Family History   Problem Relation Age of Onset     Lung Cancer Mother      Heart Failure Father          age 87     Family history reviewed.             Allergies:     Allergies   Allergen Reactions     Blood Transfusion Related (Informational Only) Other (See Comments)     Patient has a history of a clinically significant antibody against RBC antigens.  A delay in compatible RBCs may occur.     Kdc:Minocycline+[place] Blue Fcf GI Disturbance     16-PT IS NOT AWARE OF THIS REACTION     Ciprofloxacin Itching     Severe itching \"like ants were crawling\"     Hmg-Coa-R Inhibitors Other (See Comments)     Rhabdomyolysis occurred within a couple days  Rhabdomyolysis             Medications:     Current Facility-Administered Medications   Medication     acetaminophen (TYLENOL) tablet 650 mg     bisacodyl (DULCOLAX) Suppository 10 mg     cefTRIAXone (ROCEPHIN) 1 g vial to attach to  mL bag for ADULTS or NS 50 mL bag for PEDS     dextrose 5% and 0.45% NaCl + KCl 20 mEq/L infusion     lidocaine (LMX4) cream     lidocaine 1 % 0.1-1 mL     melatonin tablet 1 mg     octreotide (sandoSTATIN) 1,250 mcg in sodium chloride 0.9 % 250 mL     pantoprazole (PROTONIX) 80 mg in sodium chloride 0.9 % 100 mL infusion     polyethylene glycol (MIRALAX) Packet 17 g     senna-docusate (SENOKOT-S/PERICOLACE) 8.6-50 MG per tablet 1 tablet    Or     senna-docusate (SENOKOT-S/PERICOLACE) 8.6-50 MG per tablet 2 tablet     sodium chloride (PF) 0.9% PF flush 3 mL     sodium chloride (PF) 0.9% PF flush 3 mL             Review of Systems:   A comprehensive 10-point review of systems was performed and found to be negative except as described in the HPI.     /54 (BP Location: " "Right arm)   Pulse 54   Temp 96.1  F (35.6  C) (Oral)   Resp 16   Ht 1.854 m (6' 1\")   Wt 89.7 kg (197 lb 11.2 oz)   SpO2 98%   BMI 26.08 kg/m    PSYCH: NAD, irritable  EYES: EOMI  MOUTH: MMM  NECK: Supple, no notable adenopathy  NEURO: AAO x3            Data:     Lab Results   Component Value Date    WBC 3.9 (L) 03/17/2021    HGB 9.3 (L) 03/17/2021    HCT 28.1 (L) 03/17/2021     03/17/2021    PLT 85 (L) 03/17/2021     Lab Results   Component Value Date    CR 1.20 03/17/2021                  "

## 2021-03-17 NOTE — PROGRESS NOTES
[facility]    Hospitalist Progress Note      Assessment & Plan   Antonio Ramirez is a 75 year old male admitted on 3/16/2021. He presents with approximately 2 to 3 days of melenic stool as well as nausea and vomiting.    Acute GI hemorrhage with blood loss anemia: Hemoglobin in the 10 range, essentially stable from last September.  Reports approximately 3 days of melenic stool as well as gross hematuria.  Believes he has had some dark brown emesis as well.  Recent CT imaging demonstrated upper abdomen including esophageal varices, though this historically have not been noted on screening EGDs.  During April admission 4/16 upper endoscopy with AVMs noted in the stomach and jejunum, 4/17 colonoscopy with AVM in colon requiring argon plasma treatment, Endo Clip and epi injection.  -Gastroenterology consulted.  Patient follows with [facility] gastroenterology most recently.  -Protonix infusion  -Octreotide infusion given varices noted on recent CT imaging of the abdomen  -Ceftriaxone 1 g every 12 hours for prophylaxis given history of cirrhosis with ascites and GI bleed  -Anticoagulation to be discontinued.  History of pulmonary embolism with positive lupus inhibitor, though also with cirrhosis and history of GI bleeds requiring blood transfusions.  See most recent consultation notes from [facility] 4/24/2020 during hospitalization for GI bleed regarding discontinuation of anticoagulation if recurrent bleeding event.  -N.p.o.; plan EGD endoscopy later this afternoon, pending.  If no source noted on upper endoscopy, will likely require prep and subsequent colonoscopy  -Have been stable in the upper nines, changed hemoglobin to every 8 hours and then pending EGD endoscopy results.  -Patient consented for blood products in the ED.    Gross hematuria: Patient reports gross hematuria.  History of prostate cancer status post brachytherapy.  History of nonobstructive renal stones with recent CT  imaging.  Thought to be secondary to possible endothelial injury with known renal stones in the setting of chronic anticoagulation.  -Monitor intake and output  -Anticoagulation discontinued as above; Hgb in AM.   -follow-up .   -[facility] [place] [facility] consult; please see Note: recommended urine cytology [sent]; cystoscopy as outpatient.     CKD stage III: Baseline creatinine in the 1.5 range, though currently 1.09.  Question if these prior renal panels were actually associated with some cardiorenal syndrome with his severe pulmonary hypertension.  More historical creatinine elevations in the 1.6 range during last hospitalization was also at the time when patient was severely edematous, which is no longer the case.  -Trend CMP.      Alcohol abuse history:   Alcoholic cirrhosis of the liver.  Patient reports that he continues to drink 3 beverages per day.  Negative ethanol level on admission.  Somewhat tangential in his history. At last admission was recommended for TCU  -Occupational Therapy consulted for cognitive assessment per Admitting Hospitalist  -Monitor for S/S acute alcohol withdrawal  -2 g sodium diet when advanced from n.p.o. status  -Holding prior to admission spironolactone, torsemide given GI bleed while awaiting hemodynamic stability     Severe pulmonary hypertension:  Moderate aortic stenosis: Harsh systolic murmur across precordium.  Last TTE in September 2019 with valve area 1.5 cm at that time  -TTE   -cardiac telemetry     Coronary artery disease: Status post coronary artery bypass grafting in 2007, 2018.  Chronic systolic heart failure not currently in acute exacerbation: Severe hypokinesis of distal inferior wall with ejection fraction in the 40 to 45% range as of September 2019 echocardiogram  -prior to admission aspirin 81 mg on hold; anticoagulation discontinued  -Note statin intolerance  Paroxysmal atrial fibrillation:  -Anticoagulation to be discontinued  -Cardiac telemetry     Prolonged QT:  Noted in the emergency department following Zofran administered by EMS  -Avoid QT prolonging agents  -Repeat EKG in a.m.  -Cardiac telemetry     History of diffuse large B-cell lymphoma:   -Continue outpatient monitoring through oncology    DVT Prophylaxis: Pneumatic Compression Devices  Code Status: Full Code  Expected discharge: 2+days pending clinical improvement, EGD findings, GI clearance and plan established.    Gildardo Nicholas MD  Text Page (7am - 6pm, M-F)    Interval History   Difficult historian, does report melanotic stools, currently denies abdominal pain, nausea, emesis.  Also reports a history of gross hematuria denying dysuria, suprapubic or flank pain.  Has multiple questions regarding his evaluation, has been seen by Urology with plan discussed and seen by GI with planned endoscopy later today.    -Data reviewed today: I reviewed all new labs and imaging results over the last 24 hours.     SH: No tobacco.  Lives with Wife    FH: Reviewed and felt noncontributory.    ROS: Complete ROS negative except as above.     Physical Exam   Temp: 96.4  F (35.8  C) Temp src: Oral BP: 116/54 Pulse: 67   Resp: 16 SpO2: 98 % O2 Device: None (Room air)    Vitals:    03/16/21 2242 03/17/21 0655   Weight: 90.7 kg (200 lb) 89.7 kg (197 lb 11.2 oz)     Vital Signs with Ranges  Temp:  [96.1  F (35.6  C)-97.8  F (36.6  C)] 96.4  F (35.8  C)  Pulse:  [54-85] 67  Resp:  [8-25] 16  BP: (102-131)/(51-83) 116/54  SpO2:  [93 %-99 %] 98 %  I/O last 3 completed shifts:  In: 100 [IV Piggyback:100]  Out: 700 [Urine:700]    General/Constitutional:   NAD, alert,  HEENT/Head Exam:  atraumatic  Eyes:  PERRL, no conjunctivits  Mouth/Oral Pharynx:  Buccal mucosa WNL  Chest/Respiratory:  Air exchange bilateral lung fields; no rales or wheeze. Respiration nonlabored.  Cardiovascular:  2/6 murmur appreciated.  LE edema none; L LE prosthesis  Gastrointestinal/Abdomen:  soft, nontender,  no rebound, guarding or other peritoneal  signs.  Musculoskeletal:  extremities warm, dry, noncyanotic; no acitve synovitis. L LE prosthesis  Neurologic:  gross CN and motor testing nonfocal.  Sensation grossly tested intact.  Psychiatric:  Mental status:  Alert, oriented, affect flat  Skin:  No rash noted on gross exam    Medications     dextrose 5% and 0.45% NaCl + KCl 20 mEq/L 75 mL/hr at 03/17/21 0359     octreotide (sandoSTATIN) infusion ADULT 50 mcg/hr (03/17/21 0359)     pantoprazole (PROTONIX) infusion ADULT/PEDS GREATER than or EQUAL to 45 kg 8 mg/hr (03/17/21 1503)       cefTRIAXone  1 g Intravenous Q12H       Data   Recent Labs   Lab 03/17/21  1204 03/17/21  0655 03/16/21  2258   WBC  --  3.9* 4.4   HGB 9.5* 9.3* 10.3*   MCV  --  100 98   PLT  --  85* 104*   NA  --  142 141   POTASSIUM  --  4.3 4.6   CHLORIDE  --  110* 108   CO2  --  26 26   BUN  --  44* 41*   CR  --  1.20 1.09   ANIONGAP  --  6 7   BENNIE  --  9.0 9.6   GLC  --  70 84   ALBUMIN  --  2.8* 3.1*   PROTTOTAL  --  5.9* 6.6*   BILITOTAL  --  1.1 1.5*   ALKPHOS  --  86 97   ALT  --  18 21   AST  --  32 36       Recent Results (from the past 24 hour(s))   XR Chest 2 Views    Narrative    EXAM: XR CHEST 2 VW  LOCATION: [facility]  DATE/TIME: 3/16/2021 11:25 PM    INDICATION: GI bleeding  COMPARISON: 04/15/2020      Impression    IMPRESSION: Minimal interval decrease in size of the right-sided pleural fluid collection. Minimal persistent atelectasis right lung base. Minimal linear atelectasis or scarring left lung base. No left-sided pleural fluid. Sternotomy. Mediastinal clips.   Normal heart size and pulmonary vascularity. Atherosclerotic vascular calcification. Degenerative changes in the spine and shoulders.

## 2021-03-17 NOTE — PLAN OF CARE
Pt A&O. VSS on RA. Afib CVR, yelena at times in 50s. Denied pain/nausea. NPO until EGD, no significant results, colonoscopy planned for tomorrow- started bowel prep at 1800. PIV infusing octreotide, protonix, and fluids. Hgb stable. 1 loose black stool this AM. Incontinent of urine at times- checking PVRs. No hematuria noted, urology signed off with outpatient follow up. Scattered bruising and scab on forehead. Encouraged repositioning. Up A1, gb, walker with L prosthetic LE. Plan for colonoscopy, echo (patient refused- MD aware), and OT to see tomorrow. Wife visited today.

## 2021-03-17 NOTE — PROGRESS NOTES
RECEIVING UNIT ED HANDOFF REVIEW    ED Nurse Handoff Report was reviewed by: Joao Hurtado, RN on March 17, 2021 at 12:59 AM

## 2021-03-17 NOTE — ED NOTES
"[facility]  ED Nurse Handoff Report    ED Chief complaint: Rectal Bleeding      ED Diagnosis:   Final diagnoses:   Gastrointestinal hemorrhage with melena   Prolonged QT interval       Code Status: RN did not discuss with Pt.    Allergies:   Allergies   Allergen Reactions     Blood Transfusion Related (Informational Only) Other (See Comments)     Patient has a history of a clinically significant antibody against RBC antigens.  A delay in compatible RBCs may occur.     Kdc:Minocycline+[place] Blue Fcf GI Disturbance     11/01/16-PT IS NOT AWARE OF THIS REACTION     Ciprofloxacin Itching     Severe itching \"like ants were crawling\"     Hmg-Coa-R Inhibitors Other (See Comments)     Rhabdomyolysis occurred within a couple days  Rhabdomyolysis       Patient Story: Pt presents to ED via EMS with c/o bloody stools and vomiting for the last 24 hours. Last stool was watery, tarry black and red in color. Pt has also been vomiting. Pt is on Eliquis. Unsure if there was blood in his emesis, with vomiting 3-4x in the last 24 hours. Pt denies abdominal pain.  Focused Assessment: Dark colored stool. No diarrhea or emesis noted since arrival to ED.     Treatments and/or interventions provided: Zofran (EMS), Labs, Chest X-Ray, Rocephin, Protonix, Magnesium  Patient's response to treatments and/or interventions: No change at this time.     To be done/followed up on inpatient unit:  GI follow up    Does this patient have any cognitive concerns?: n/a    Activity level - Baseline/Home:  Walker  Activity Level - Current:   Walker    Patient's Preferred language: English   Needed?: No    Isolation: None  Infection: Not Applicable  Patient tested for COVID 19 prior to admission: YES  Bariatric?: No    Vital Signs:   Vitals:    03/16/21 2245 03/16/21 2300 03/16/21 2315 03/16/21 2330   BP:       Pulse: 74 77 77 68   Resp: 23 25 18 25   Temp:       TempSrc:       SpO2: 98% 98% 97% 98%   Weight:       Height:     "       Cardiac Rhythm:     Was the PSS-3 completed:   Yes  What interventions are required if any?               Family Comments: n/a  OBS brochure/video discussed/provided to patient/family: N/A              Name of person given brochure if not patient: n/a              Relationship to patient: n/a    For the majority of the shift this patient's behavior was Green.   Behavioral interventions performed were n/a.    ED NURSE PHONE NUMBER: [phone]

## 2021-03-17 NOTE — PHARMACY-ADMISSION MEDICATION HISTORY
Pharmacy Medication History  Admission medication history interview status for the 3/16/2021  admission is complete. See EPIC admission navigator for prior to admission medications     Location of Interview: Phone  Medication history sources: Patient    Significant changes made to the medication list:  Stop gabapentin, spironolactone, torsemide, bactrim    In the past week, patient estimated taking medication this percent of the time: greater than 90%    Additional medication history information:   Recent Bactrim Rx  1 DS tablet BID x 10 days for UTI (stopped 3 days early due to neg culture)    Medication reconciliation completed by provider prior to medication history? No    Time spent in this activity: 15 minutes    Prior to Admission medications    Medication Sig Last Dose Taking? Auth Provider   apixaban ANTICOAGULANT (ELIQUIS) 2.5 MG tablet Take 1 tablet (2.5 mg) by mouth daily 3/17/2021 at am Yes Main Hardy MD   aspirin 81 MG EC tablet Take 81 mg by mouth daily 3/17/2021 at am Yes Reported, Patient       The information provided in this note is only as accurate as the sources available at the time of update(s)     Yasmin Benavidez, Juan  Inpatient Clinical Pharmacist  [phone]

## 2021-03-17 NOTE — CONSULTS
"BRIEF NUTRITION ASSESSMENT      REASON FOR ASSESSMENT:  Antonio Ramirez is a 75 year old male seen by Registered Dietitian for Admission Nutrition Risk Screen:  - Have you recently lost weight without trying? \"unsure\"  - Have you been eating poorly because of a decreased appetite? \"yes\"      NUTRITION HISTORY:  Visited with pt this morning - seemed a bit crabby and not very conversant  Notes that he doesn't have a big appetite - \"all I do is sit around all day\"  Drinks 1 vanilla Ensure per day  Follows a regular diet    Note that patient has had 3 days of melena, nausea with dark colored vomit PTA    Patient reports that he continues to drink 3 ETOH beverages per day.    CURRENT DIET AND INTAKE:  Diet:  NPO              Pt scheduled for EGD today  \"They aren't letting me eat or drink anything\"  Pt is receptive to Boost supplement when diet advanced    ANTHROPOMETRICS:  Height: 6' 1\"  Weight:(3/17) 89.7 kg /  197 lbs 11.2 oz  IBW:  78 kg (pt with left BKA)  %IBW: 115%  Weight History:   Pt denies noticing any wt loss - \"I feel the same, my clothes fit the same, I don't think I have really lost wt\"  Records reflect that wt is down ~30# (14%) over the past year    Wt Readings from Last 10 Encounters:   03/17/21 89.7 kg (197 lb 11.2 oz)   09/14/20 102.1 kg (225 lb)   08/10/20 102.1 kg (225 lb)   07/02/20 102.1 kg (225 lb)   06/18/20 102.1 kg (225 lb)   06/15/20 102.1 kg (225 lb)   04/26/20 102.5 kg (225 lb 15.5 oz)   03/11/20 103.8 kg (228 lb 13.4 oz)   03/09/20 108.7 kg (239 lb 11.2 oz)   02/28/20 108.9 kg (240 lb)         LABS:  Labs noted    MALNUTRITION:  Patient does not meet two of the following criteria necessary for diagnosing malnutrition.     % Weight Loss:  Up to 20% in 1 year (non-severe malnutrition) - 14% wt loss over the past year  % Intake:  Decreased intake does not meet criteria for malnutrition  - decreased po past 4 days (N/V and now NPO)  Subcutaneous Fat Loss:  None observed  Muscle Loss:  Temporal " region  - mild depletion (not used as indicator)  Fluid Retention:  None noted      NUTRITION INTERVENTION:  Nutrition Diagnosis:  No nutrition diagnosis at this time.    Implementation:  Nutrition Education ---> Reviewed menu and meal ordering process  Will send a chocolate Boost once daily once diet advanced    FOLLOW UP/MONITORING:   Will re-evaluate in 7 - 10 days, or sooner, if re-consulted.

## 2021-03-17 NOTE — PLAN OF CARE
Primary Diagnosis: GI bleed  Orientation: A&O4  Aggression Stop Light: green  Mobility: Ax1-2, hasnt moved much, BTK ambutee left  Pain Management: denies  Diet: NPO for EDG  Bowel/Bladder: continent of bladder  Abnormal Lab/Assessments: HbG 10.3  Drain/Device/Wound: L & R PIV  Consults: Urology, GI, OT, SW  D/C Day/Goals/Place: pending    Shift Note:     A&Ox4. Up w/Ax1-2, patient L BTK amputee, has not seen move yet but could get from cart to bed ok. NPO for EDG in AM. VSS on RA. Denies pain. LPIV infusing D5 1/2 NS w/ 20 meq of KCI @75 w/ Protonix drip @10 ml.hr at Y site. Right sided PIV infusing octreotide at 10 ml/hr. Patient had multiple bruising throughout skin, scab on head with scar on R side of head from craniotomy in past per patient. 1 episode of GI bleed. Some redness on coccyx but likely from incontinence and not pressure related. Patient to have EDG in AM, discharge pending intervention.

## 2021-03-17 NOTE — CONSULTS
[facility]  Gastroenterology Consultation         Antonio Ramirez  [address]   [facility] [id number]  75 year old male    Admission Date/Time: 3/16/2021  Primary Care Provider: Main Hardy  Referring / Attending Physician:  Dr. Von Gomez     We were asked to see the patient in consultation by Dr. Von Gomez for evaluation of GI bleed.      CC: melena, nausea and vomiting    HPI:  Antonio Ramirez is a 75 year old male who has a past medical h/o alcoholism, anemia, anticardiolipin and antiphospholipid antibody syndromes, aoritic stenosis, CAD- S/o CABG 2007, GERD, h/o MI x5, alcoholic liver cirrhosis, anemia amongst others whom presented to [facility] ED with complaints of 2-3 days of melena, nausea and vomiting and hematuria. Emesis noted to be dark in color with no red. He has had no bright red blood in stool.     Patient has had similar presentation in past with melena, last April 2020, underwent EGD and colonoscopy. AVMs noted in stomach and jejunum and benign esophageal stenosis, no evidence of gastric/esophageal varices- did note portal hypertensive gastropathy. A actively bleeding angiectasia seen in colon required argon treatment and endo clip placement and epi injections. He underwent multiple capsule endoscopies (PillCam) studies in past and no clear source of blood loss. He does have a history as above of hypercoagulability and has been on anticoagulation in past with warfarin- there had been concerns with adherence and unstable INR- therefore switched to NOAC (last dose of Eliquis 3/16 a.m.)  due to h/o anticardiolipin and antiphospholipid antibody syndromes and prior PE.    Patient reports he continues to consume 3 alcoholic beverages daily. He has had no abdominal or chest pain. Denies fever, chills, palpitation, fatigue, dizziness, constipation or diarrhea.     ROS: A comprehensive ten point review of systems was negative aside from those in mentioned in the HPI.       PAST MED HX:  I have reviewed this patient's medical history and updated it with pertinent information if needed.   Past Medical History:   Diagnosis Date     Alcoholism (H)      Amputated left leg (H)      Anemia      Anticardiolipin antibody syndrome (H)      Antiphospholipid antibody syndrome (H)      Antiplatelet or antithrombotic long-term use      Aortic root dilatation (H)      Aortic stenosis      Arthritis      Balance problem      Coronary artery disease     CABG 2007: SVG to LAD, SVG to diagonal, SVG to OM; cardiac cath 5/17/18: thrombectomy for restenosis of stents-sent for urgent CABG, cath 5/14/2007: GRECIA x2 to LAD, [place] to diagonal     Gastro-oesophageal reflux disease      Heart attack (H) 2007    apparently arrested, cardiac X5     Hiatal hernia      Hypercholesterolemia      Hypertension      Ischemic cardiomyopathy      Left foot drop      Liver disease     alcoholic liver disease, alcoholic cirrohsosis, portal hypertension     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)     ?When diagnosed, patient not aware of this     Moderate mitral regurgitation      Monoclonal gammopathy      Neuropathy      Noninfectious ileitis     diverticulitis of colon     Nonrheumatic tricuspid valve regurgitation      Paroxysmal atrial fibrillation (H)      PE (pulmonary embolism) 2006    saddle emboli 2006     Prostate cancer (H) 2000    Treated with radiation (seed implants in 2000) -- no problems since     Pulmonary hypertension (H)      Renal disease     kidney stones     Syncope      Thrombocytopenia (H)      Urethral stricture      Venous thrombosis     IVC filter and lysis procedures 2007       MEDICATIONS:   Prior to Admission Medications   Prescriptions Last Dose Informant Patient Reported? Taking?   apixaban ANTICOAGULANT (ELIQUIS) 2.5 MG tablet   No No   Sig: Take 1 tablet (2.5 mg) by mouth daily   aspirin 81 MG EC tablet   Yes No   Sig: Take 81 mg by mouth daily      Facility-Administered  "Medications: None       ALLERGIES:   Allergies   Allergen Reactions     Blood Transfusion Related (Informational Only) Other (See Comments)     Patient has a history of a clinically significant antibody against RBC antigens.  A delay in compatible RBCs may occur.     Kdc:Minocycline+[place] Blue Fcf GI Disturbance     16-PT IS NOT AWARE OF THIS REACTION     Ciprofloxacin Itching     Severe itching \"like ants were crawling\"     Hmg-Coa-R Inhibitors Other (See Comments)     Rhabdomyolysis occurred within a couple days  Rhabdomyolysis       SOCIAL HISTORY:  Social History     Tobacco Use     Smoking status: Never Smoker     Smokeless tobacco: Never Used   Substance Use Topics     Alcohol use: Yes     Frequency: 4 or more times a week     Drinks per session: 1 or 2     Binge frequency: Never     Comment: 10-12 drinks a week     Drug use: No       FAMILY HISTORY:  Family History   Problem Relation Age of Onset     Lung Cancer Mother      Heart Failure Father          age 87       PHYSICAL EXAM:   General  Alert, oriented and comfortable  Vital Signs with Ranges  Temp: 96.1  F (35.6  C) Temp src: Oral BP: 113/54 Pulse: 54   Resp: 16 SpO2: 98 % O2 Device: None (Room air)    I/O last 3 completed shifts:  In: 100 [IV Piggyback:100]  Out: 600 [Urine:600]    Constitutional: healthy, alert and no distress   Cardiovascular: negative, PMI normal. No lifts, heaves, or thrills. RRR. No murmurs, clicks gallops or rub  Respiratory: negative, Percussion normal. Good diaphragmatic excursion. Lungs clear  Head: Normocephalic. No masses, lesions, tenderness or abnormalities  Neck: Neck supple. No adenopathy. Thyroid symmetric, normal size,, Carotids without bruits.  Abdomen: Abdomen soft, non-tender. BS normal. No masses, organomegaly          ADDITIONAL COMMENTS:   I reviewed the patient's new clinical lab test results.   Recent Labs   Lab Test 21  0655 21  2258 20  1308 20  1150 08/10/20  1258   WBC " 3.9* 4.4  --  3.3*  --    HGB 9.3* 10.3*  --  10.5*  --     98  --  84  --    PLT 85* 104* 101* 118* 77*   INR  --   --  1.54* 1.66* 1.38*     Recent Labs   Lab Test 03/17/21  0655 03/16/21  2258 04/26/20  0832   POTASSIUM 4.3 4.6 4.5   CHLORIDE 110* 108 106   CO2 26 26 25   BUN 44* 41* 23   ANIONGAP 6 7 5     Recent Labs   Lab Test 03/17/21  0655 03/16/21  2258 03/03/21  1236 04/25/20  0857 06/11/19  0220 06/11/19  0220 03/03/19  1320 03/03/19  1320   ALBUMIN 2.8* 3.1*  --  2.3*   < >  --    < >  --    BILITOTAL 1.1 1.5*  --  0.6   < >  --    < >  --    ALT 18 21  --  12   < >  --    < >  --    AST 32 36  --  25   < >  --    < >  --    PROTEIN  --   --  100*  --   --  10*  --  10*    < > = values in this interval not displayed.       I reviewed the patient's new imaging results.        CONSULTATION ASSESSMENT AND PLAN:    Antonio Ramirez is a pleasant 75 year old male with alcoholic liver cirrhosis, on Eliquis for h/o PE. Presented with 3 days of melena and GI consulted with concerns for GI bleed.    Principal Problem:    Gastrointestinal hemorrhage with melena    Alcoholic cirrhosis of liver (H)  Patient has had 3 days of melena, nausea with dark colored vomit, takes Eliquis daily (last dose 3/16 a.m.). Has well documented alcoholic liver cirrhosis- despite continues to drink 3 alcoholic beverages daily.  Hemoglobin 10.3->9.3 with baseline around 10-11. Platelets 85, WBC 3.9.  CMP: Sodium 142, Creatinine 1.20, Albumin 2.8, LFTs unremarkable, Tbili 1.1.  CT on 3/10 noted cirrhosis with portal hypertension small ascites and abdominal and esophageal varices. There is noted multiple nonobstructing left renal calculi. Moderate right and small left pleural effusions.    Cause of melena likely from upper GI bleed possibly from recurrent AVM bleed vs esophageal varices (unlikely as hemoglobin fairly stable) vs lower GI bleed. Patient is at elevated risk given use of anticoagulation.    1. Plan EGD today and if  negative will plan colonoscopy tomorrow  2. NPO  3. Continue with IV pantoprazole 40 mg BID  4. Serial hemogllbin q 12 hours  5. Continue with IV octreotide, pantoprazole,antiemetics and IV fluids  6. Hold Eliquis        [facility].  Office: [phone]  Cell : [phone] (Dr. Nagy)  Cell: [phone] (Shirley Elizondo PA-C)

## 2021-03-17 NOTE — UTILIZATION REVIEW
"  Admission Status; Secondary Review Determination         Under the authority of the Utilization Management Committee, the utilization review process indicated a secondary review on the above patient.  The review outcome is based on review of the medical records, discussions with staff, and applying clinical experience noted on the date of the review.        (X)      Inpatient Status Appropriate - This patient's medical care is consistent with medical management for inpatient care and reasonable inpatient medical practice.      () Observation Status Appropriate - This patient does not meet hospital inpatient criteria and is placed in observation status. If this patient's primary payer is Medicare and was admitted as an inpatient, Condition Code 44 should be used and patient status changed to \"observation\".   () Admission Status NOT Appropriate - This patient's medical care is not consistent with medical management for Inpatient or Observation Status.          RATIONALE FOR DETERMINATION     75 year old male who has a past medical h/o alcoholism, anemia, anticardiolipin and antiphospholipid antibody syndromes, aoritic stenosis, CAD- S/o CABG 2007, GERD, h/o MI x5, alcoholic liver cirrhosis, anemia amongst others who presented to [facility] ED with complaints of 2-3 days of melena, nausea and vomiting and hematuria.  He is currently on Eliquis. Hemoglobin has trended down to 9.3.  He was placed on Octreotide and Protonix drips.  He will receive IV fluids and anti-emetics.  His anticoagulation will be held, and he have serial hemoglobins.  GI was consulted.  He is appropriate for Inpatient status.    The severity of illness, intensity of service provided, expected LOS and risk for adverse outcome make the care complex, high risk and appropriate for hospital admission.        The information on this document is developed by the utilization review team in order for the business office to ensure compliance.  This only denotes " the appropriateness of proper admission status and does not reflect the quality of care rendered.         The definitions of Inpatient Status and Observation Status used in making the determination above are those provided in the CMS Coverage Manual, Chapter 1 and Chapter 6, section 70.4.      Sincerely,     Shahbaz Morris MD  Physician Advisor  Utilization Review/ Case Management  [facility].

## 2021-03-17 NOTE — ED PROVIDER NOTES
History   Chief Complaint  Rectal Bleeding    HPI  Antonio Ramirez is a 75 year old male with a complex past medical history, including anticoagulation for atrial fibrillation/PE/DVT (anticoagulated on Eliquis), rectal bleeding, alcoholism with ascites, CAD, hypertension, hypercholesteremia, prostate cancer, CKD stage III, and TAA, status post left leg amputation, who presents via EMS for evaluation of bright red diarrhea that has been ongoing for the past 24 hours now. He also reports four episodes of emesis but he was not clear as to if there was any blood in his vomit. Feeling as though his symptoms were getting worse throughout the day, he decided to come in for evaluation. Denies any abdominal pain, fevers, lightheadedness, or dizziness. The patient does endorse drinking alcohol daily.  Last dose of Eliquis was this morning.    Colonoscopy on 4/17/2020  A single bleeding colonic angioectasia. Injected. Tattooed. Treated with argon plasma coagulation (APC). Clips were placed. Diverticulosis in the sigmoid colon, in the descending colon and in the transverse colon. One 15 mm polyp in the ascending colon. Blood in the entire examined colon. The examined portion of the ileum was normal. No specimens collected.     Upper GI Endoscopy on 3/31/2020  Benign-appearing esophageal stenosis. Portal hypertensive gastropathy. No evidence of UGIB, or gastric/esophageal varices.     Review of Systems   Gastrointestinal: Positive for blood in stool, diarrhea and vomiting. Negative for abdominal pain.   Neurological: Negative for dizziness and light-headedness.   All other systems reviewed and are negative.    Allergies  Ciprofloxacin  Hmg-Coa-R Inhibitors    Medications  Eliquis  Aspirin  Gabapentin  Aldactone  Demadex    Past Medical History  Alcoholism with ascites   Amputated left leg  Anemia  Aortic root dilation  Aortic stenosis  Arthritis  CAD  GERD  MI  Hypercholesteremia  Hypertension  Liver disease  Paroxysmal Atrial  "fibrillation  PE  Prostate cancer  Pulmonary hypertension  Thrombocytopenia  CKD stage III  DVT  Congestive heart failure  Thoracic aortic aneurysm  Vitamin D deficiency    Past Surgical History  Amputate leg below knee  Appendectomy  Right total hip arthroplasty  CABG  Cholecystectomy  Right craniotomy for subdural hematoma  Cystoscopy, dilate urethra and remove stents  Hernia repair  IR visceral angiogram    Family History  Lung cancer  Heart failure    Social History  Presents to the ED alone, via EMS.  Drinks alcohol daily    Physical Exam     Patient Vitals for the past 24 hrs:   BP Temp Temp src Pulse Resp SpO2 Height Weight   03/17/21 0030 128/75 -- -- 71 23 98 % -- --   03/17/21 0000 -- -- -- 75 10 93 % -- --   03/16/21 2345 131/81 -- -- 70 8 98 % -- --   03/16/21 2330 -- -- -- 68 25 98 % -- --   03/16/21 2315 -- -- -- 77 18 97 % -- --   03/16/21 2300 -- -- -- 77 25 98 % -- --   03/16/21 2245 -- -- -- 74 23 98 % -- --   03/16/21 2242 -- 97.8  F (36.6  C) Oral -- -- -- 1.854 m (6' 1\") 90.7 kg (200 lb)   03/16/21 2240 -- -- -- 80 15 98 % -- --   03/16/21 2230 126/83 -- -- 85 -- 99 % -- --     Physical Exam  General: Alert and cooperative with exam. Patient in mild distress. Normal mentation.  Head:  Scalp is NC/AT  Eyes:  No scleral icterus, PERRL  ENT:  The external nose and ears are normal. The oropharynx is normal and without erythema; mucus membranes are moist.   Neck:  Normal range of motion without rigidity.  CV:  Irregular rate and rhythm.     Moderate systolic murmur  Resp:  Breath sounds are clear bilaterally    Non-labored, no retractions or accessory muscle use  GI:  Abdomen is soft, no distension, no tenderness. No peritoneal signs  MS:  No lower extremity edema.  Left below the knee amputation with prosthetic in place  Skin:  Warm and dry, No rash or lesions noted.  Neuro: Oriented x 3. No gross motor deficits.  Rectal: Melena.     Emergency Department Course   ECG:   ECG taken at 2303, ECG read " at 2310  Atrial fibrillation. Rightward axis. T wave abnormality, consider anterior ischemia. Prolonged QT. Abnormal ECG.   No significant change as compared to prior, dated 3/31/20.   Rate 71 bpm. WV interval * ms. QRS duration 82 ms. QT/QTc 476/517 ms. P-R-T axes * 94 111.    Imaging:  XR Chest 2 views:   Minimal interval decrease in size of the right-sided pleural fluid collection. Minimal persistent atelectasis right lung base. Minimal linear atelectasis or scarring left lung base. No left-sided pleural fluid. Sternotomy. Mediastinal clips. Normal heart size and pulmonary vascularity. Atherosclerotic vascular calcification. Degenerative changes in the spine and shoulders. As per radiology.     Laboratory:  CBC: WBC: 4.4, HGB: 10.3 (L), PLT: 104 (L)  CMP: BUN 41 (H), bilirubin total 1.5 (H), albumin 3.1 (L), protein total 6.6 (L), o/w WNL (Creatinine: 1.09)  Alcohol level blood: <0.01  ABO/Rh type and screen: O positive, antibody negative    Occult blood stool: positive (A)    Asymptomatic COVID: pending    Emergency Department Course:  Reviewed:  I reviewed nursing notes, vitals and past medical history    Assessments:  2232 I physically examined the patient as documented above.    2350 I rechecked the patient and updated him on the plan for admission.     Consults:   2347 I consulted with Dr. Gomez, on call hospitalist, regarding the patient's history and presentation here in the emergency department.    Interventions:  2327 Protonix 40 mg IV  2328 Rocephin 1 g in NS via IV  0031 Magnesium sulfate 2 g in water IV infusion    Disposition:  The patient was admitted to the hospital under the care of Dr. Gomez.     Impression & Plan   Medical Decision Making:  Antonio Ramirez is a 75 year old male who presents with blood in his stool; anticoagulated on Eliquis.  I considered a broad differential diagnosis for this patient including upper GIB (ulcer, gastritis, avm, tumor, etc) vs lower GIB (tumor, diverticulosis,  avm, meckels, etc), colitis, aortoenteric fistula, hemorrhoids, fissures,  Ischemic colitis, bacterial stool infection (salmonella, shigella, e. Coli, campylobacter).  The workup in the ED is consistent with gastrointestinal bleeding, although it is unclear if it is lower or upper.  Labs notable for mild anemia (hemoglobin 10.3), thrombocytopenia (platelets 104), significant elevated BUN (41), and Hemoccult positive/melanotic stool.  Given history of alcoholism and liver disease patient was provided empiric 1 g ceftriaxone.  Additionally provided Protonix and as he was noted to have mildly prolonged QT EKG, was also provided 2 g IV magnesium.  EKG also demonstrated known atrial fibrillation.  No indication to start octreotide as my suspicion of variceal bleed is so low.  Given amount of blood on exam/degree of anemia/sxs consistent with concerning degree of blood loss, I would admit at this point for serial hemoglobins.  Patient was typed and screened.  Discussed with hospitalist.  They are stable and do not need ICU care at this point; no indication for emergent GI consult in the ED.    Diagnosis:    ICD-10-CM    1. Gastrointestinal hemorrhage with melena  K92.1 Asymptomatic SARS-CoV-2 COVID-19 Virus (Coronavirus) by PCR   2. Prolonged QT interval  R94.31      Scribe Disclosure:  I, Mark Gamino, am serving as a scribe at 10:30 PM on 3/16/2021 to document services personally performed by Jg Alonzo DO based on my observations and the provider's statements to me.      Jg Alonzo DO  03/17/21 0788

## 2021-03-18 NOTE — PROGRESS NOTES
[facility]  Gastroenterology Progress Note     Antonio Ramirez MRN# [id number]   YOB: 1945 Age: 75 year old          Assessment and Plan:   Antonio Ramirez is a pleasant 75 year old male with alcoholic liver cirrhosis, on Eliquis for h/o PE. Presented with 3 days of melena and GI consulted with concerns for GI bleed.     Principal Problem:    Gastrointestinal hemorrhage with melena    Alcoholic cirrhosis of liver (H)  Patient has had 3 days of melena, nausea with dark colored vomit, takes Eliquis daily (last dose 3/16 a.m.). Has well documented alcoholic liver cirrhosis- despite continues to drink 3 alcoholic beverages daily.  Hemoglobin stable at 9.2 with baseline around 10-11. Platelets 85, WBC 3.9.  3/17 EGD noted esophagitis, benign appearing esophageal stenosis no esophageal varices    Prepped for colonoscopy stools still brown with blood clots    1. Plan colonoscopy today  2. Give magnesium citrate  3. NPO  4. Serial hemoglobin q 12 hours  5. Stop octreotide and pantoprazole IV  6. Start oral pantoprazole 40 mg daily  6. Hold Eliquis         Interval History:   no new complaints, doing well, denies chest pain, denies shortness of breath, denies abdominal pain, alert, oriented to person, place and time, has had a bowel movement in the last 24 hours and doing well; no cp, sob, n/v/d, or abd pain.              Review of Systems:   C: NEGATIVE for fever, chills, change in weight  E/M: NEGATIVE for ear, mouth and throat problems  R: NEGATIVE for significant cough or SOB  CV: NEGATIVE for chest pain, palpitations or peripheral edema             Medications:   I have reviewed this patient's current medications    cefTRIAXone  1 g Intravenous Q12H     [START ON 3/19/2021] pantoprazole  40 mg Oral QAM AC                  Physical Exam:   Vitals were reviewed  Vital Signs with Ranges  Temp:  [95.4  F (35.2  C)-97.5  F (36.4  C)] 97.1  F (36.2  C)  Pulse:  [54-76] 61  Resp:  [9-55]  18  BP: (102-134)/(51-78) 114/65  SpO2:  [90 %-100 %] 96 %  I/O last 3 completed shifts:  In: 1094 [I.V.:1094]  Out: 425 [Urine:425]  Constitutional: healthy, alert and no distress   Cardiovascular: negative, PMI normal. No lifts, heaves, or thrills. RRR. No murmurs, clicks gallops or rub  Respiratory: negative, Percussion normal. Good diaphragmatic excursion. Lungs clear  Head: Normocephalic. No masses, lesions, tenderness or abnormalities  Neck: Neck supple. No adenopathy. Thyroid symmetric, normal size,, Carotids without bruits.  Abdomen: Abdomen soft, non-tender. BS normal. No masses, organomegaly           Data:   I reviewed the patient's new clinical lab test results.   Recent Labs   Lab Test 03/18/21  0657 03/18/21  0108 03/17/21  1930 03/17/21  0655 03/17/21  0655 03/16/21 2258 12/07/20  1308 09/14/20  1150 08/10/20  1258   WBC  --   --   --   --  3.9* 4.4  --  3.3*  --    HGB 9.2* 9.2* 9.3*   < > 9.3* 10.3*  --  10.5*  --    MCV  --   --   --   --  100 98  --  84  --    PLT  --   --   --   --  85* 104* 101* 118* 77*   INR  --   --   --   --   --   --  1.54* 1.66* 1.38*    < > = values in this interval not displayed.     Recent Labs   Lab Test 03/17/21  0655 03/16/21 2258 04/26/20  0832   POTASSIUM 4.3 4.6 4.5   CHLORIDE 110* 108 106   CO2 26 26 25   BUN 44* 41* 23   ANIONGAP 6 7 5     Recent Labs   Lab Test 03/17/21  1300 03/17/21  0655 03/16/21 2258 03/03/21  1236 04/25/20  0857 06/11/19  0220 06/11/19  0220   ALBUMIN  --  2.8* 3.1*  --  2.3*   < >  --    BILITOTAL  --  1.1 1.5*  --  0.6   < >  --    ALT  --  18 21  --  12   < >  --    AST  --  32 36  --  25   < >  --    PROTEIN 10*  --   --  100*  --   --  10*    < > = values in this interval not displayed.       I reviewed the patient's new imaging results.    All laboratory data reviewed  All imaging studies reviewed by me.    Shirley Elizondo PA-C,  3/18/2021  [facility]  Office : [phone]  Cell: [phone] (  Yakov)  Cell: [phone] (Shirley Elizondo PA-C)

## 2021-03-18 NOTE — PROVIDER NOTIFICATION
MD Notification    Notified Person: MD    Notified Person Name: Shirley Elizondo    Notification Date/Time: 3/18 0830    Notification Interaction: call    Purpose of Notification: Pt's stool not clear, colonoscopy scheduled 1030    Orders Received: place order for 1 time mag citrate solution ASAP    Comments:

## 2021-03-18 NOTE — PROGRESS NOTES
[facility]    Hospitalist Progress Note      Assessment & Plan   Antonio Ramirez is a 75 year old male admitted on 3/16/2021. He presents with approximately 2 to 3 days of melenic stool as well as nausea and vomiting.    Acute GI hemorrhage with blood loss anemia: Hemoglobin in the 10 range, essentially stable from last September.  Reports approximately 3 days of melenic stool as well as gross hematuria.  Believes he has had some dark brown emesis as well.  Recent CT imaging demonstrated upper abdomen including esophageal varices, though this historically have not been noted on screening EGDs.  During April admission 4/16 upper endoscopy with AVMs noted in the stomach and jejunum, 4/17 colonoscopy with AVM in colon requiring argon plasma treatment, Endo Clip and epi injection.  -Gastroenterology consulted.  Patient follows with [facility] gastroenterology most recently.  -Protonix infusion started initially, later transitioned to p.o. Protonix after EGD  -Octreotide infusion initially given varices noted on recent CT imaging of the abdomen.  Later discontinued after EGD.  -Ceftriaxone 1 g every 12 hours for prophylaxis given history of cirrhosis with ascites and GI bleed started.  Later discontinued after EGD.  -Anticoagulation to be discontinued.  History of pulmonary embolism with positive lupus inhibitor, though also with cirrhosis and history of GI bleeds requiring blood transfusions.  See most recent consultation notes from [place] [facility] 4/24/2020 during hospitalization for GI bleed regarding discontinuation of anticoagulation if recurrent bleeding event.  -Underwent EGD on 3/17 with no source identified.  Subsequently underwent colonoscopy on 3/18 which did show multiple bleeding colonic angiodysplastic lesions with culprit lesions felt to be in the cecum.  These were treated with bipolar cautery.  Also noted were rectal varices.  -Given patient's recurrent issues with bleeding, will  discontinue anticoagulation.  Per GI, if anticoagulation absolutely needed, may resume after a week understanding risks still involved.  -Have been stable in the upper nines, changed hemoglobin to every 12 hours.  -Patient consented for blood products in the ED.    Gross hematuria: Patient reports gross hematuria.  History of prostate cancer status post brachytherapy.  History of nonobstructive renal stones with recent CT imaging.  Thought to be secondary to possible endothelial injury with known renal stones in the setting of chronic anticoagulation.  -Monitor intake and output  -Anticoagulation discontinued as above; Hgb in AM.   -follow-up UC.  Negative urine culture.  -[place] [facility] consult; please see Note: recommended urine cytology [sent]; cystoscopy as outpatient.     CKD stage III: Baseline creatinine in the 1.5 range, though currently 1.09.  Question if these prior renal panels were actually associated with some cardiorenal syndrome with his severe pulmonary hypertension.  More historical creatinine elevations in the 1.6 range during last hospitalization was also at the time when patient was severely edematous, which is no longer the case.  -Trend CMP as needed.      Alcohol abuse history:   Alcoholic cirrhosis of the liver.  Patient reports that he continues to drink 3 beverages per day.  Negative ethanol level on admission.  Somewhat tangential in his history. At last admission was recommended for TCU  -Occupational Therapy consulted for cognitive assessment per Admitting Hospitalist  -Monitor for S/S acute alcohol withdrawal  -Holding prior to admission spironolactone, torsemide given GI bleed concerns     Severe pulmonary hypertension:  Moderate aortic stenosis: Harsh systolic murmur across precordium.  Last TTE in September 2019 with valve area 1.5 cm at that time  -cardiac telemetry.  Patient refused TTE here.     Coronary artery disease: Status post coronary artery bypass grafting in 2007,  2018.  Chronic systolic heart failure not currently in acute exacerbation: Severe hypokinesis of distal inferior wall with ejection fraction in the 40 to 45% range as of September 2019 echocardiogram  -prior to admission aspirin 81 mg on hold; anticoagulation discontinued  -Note statin intolerance    Paroxysmal atrial fibrillation:  -Anticoagulation to be discontinued  -Cardiac telemetry     Prolonged QT: Noted in the emergency department following Zofran administered by EMS  -Avoid QT prolonging agents  -Cardiac telemetry     History of diffuse large B-cell lymphoma:   -Continue outpatient monitoring through oncology    DVT Prophylaxis: Pneumatic Compression Devices  Code Status: Full Code  Expected discharge: Home tomorrow if hemoglobin remains stable and no longer bleeding.    Xochitl Robles MD  [employer] Page (7am - 6pm, M-F)    Interval History   Stable overnight.  Patient does not know if he had any more bleeding per rectum as he states that he does not normally check his stools.  Patient is a difficult historian.  Answers were tangential and he kept focusing on his frustrations with the way the current hospital system is.  Did not like the fact that hospitalist keep changing and he insisted that he never saw the hospitalist yesterday.  Also told me that he does not have any other medical conditions and he is here only for his GI issues which he wants fixed and he wants to leave promptly after that.    Physical Exam   Temp: 96.8  F (36  C) Temp src: Oral BP: 97/53 Pulse: 57   Resp: 16 SpO2: 97 % O2 Device: None (Room air) Oxygen Delivery: 2 LPM  Vitals:    03/16/21 2242 03/17/21 0655 03/18/21 0651   Weight: 90.7 kg (200 lb) 89.7 kg (197 lb 11.2 oz) 90 kg (198 lb 6.4 oz)     Vital Signs with Ranges  Temp:  [95.4  F (35.2  C)-97.5  F (36.4  C)] 96.8  F (36  C)  Pulse:  [55-76] 57  Resp:  [0-55] 16  BP: ()/(53-78) 97/53  SpO2:  [90 %-100 %] 97 %  I/O last 3 completed shifts:  In: 1094 [I.V.:1094]  Out: 425  [Urine:425]    General/Constitutional:   NAD, alert, no distress, cooperative  HEENT/Head Exam:  atraumatic  Chest/Respiratory:  Air exchange bilateral lung fields; no rales or wheeze. Respiration nonlabored.  Cardiovascular: Systolic murmur appreciated.  LE edema none; L LE prosthesis  Gastrointestinal/Abdomen:  soft, nontender,  no rebound, guarding or other peritoneal signs.  Musculoskeletal:  extremities warm, dry, noncyanotic; no acitve synovitis. L LE prosthesis  Neurologic:  gross CN and motor testing nonfocal.  Able to engage in appropriate conversation  Psychiatric:  Mental status:  Alert, oriented, appears to lack insight into his medical conditions  Skin:  No rash noted on gross exam    Medications     dextrose 5% and 0.45% NaCl + KCl 20 mEq/L 75 mL/hr at 03/18/21 0638       cefTRIAXone  1 g Intravenous Q12H     [START ON 3/19/2021] pantoprazole  40 mg Oral QAM AC       Data   Recent Labs   Lab 03/18/21  0657 03/18/21  0108 03/17/21  1930 03/17/21  0655 03/17/21  0655 03/16/21  2258   WBC  --   --   --   --  3.9* 4.4   HGB 9.2* 9.2* 9.3*   < > 9.3* 10.3*   MCV  --   --   --   --  100 98   PLT  --   --   --   --  85* 104*   NA  --   --   --   --  142 141   POTASSIUM  --   --   --   --  4.3 4.6   CHLORIDE  --   --   --   --  110* 108   CO2  --   --   --   --  26 26   BUN  --   --   --   --  44* 41*   CR  --   --   --   --  1.20 1.09   ANIONGAP  --   --   --   --  6 7   BENNIE  --   --   --   --  9.0 9.6   GLC  --   --   --   --  70 84   ALBUMIN  --   --   --   --  2.8* 3.1*   PROTTOTAL  --   --   --   --  5.9* 6.6*   BILITOTAL  --   --   --   --  1.1 1.5*   ALKPHOS  --   --   --   --  86 97   ALT  --   --   --   --  18 21   AST  --   --   --   --  32 36    < > = values in this interval not displayed.       No results found for this or any previous visit (from the past 24 hour(s)).

## 2021-03-18 NOTE — PLAN OF CARE
A&O x4. VSS on RA. Afib CVR, yelena at times in 50s. Denied pain/nausea. Pt finished most of bowel prep at 2300 for colonoscopy in the AM, pt refused to drinking the last small cup of it. NPO after. Pt is having bloody red/black stools. PIV infusing octreotide, protonix, and fluids. Hgb stable at 9.2-9.3. Red/black stool overnight with unknown small pieces of something everytime pt has BM. Pt voiding in urinal. Scattered bruising and scab on forehead. Encouraged repositioning. Pt did not get out of bed during the night, Up A1, gb, walker with L prosthetic LE. Plan for colonoscopy, echo, and OT to see today.

## 2021-03-18 NOTE — PLAN OF CARE
VSS on RA, BP soft. Tele Afib CVR. Denied pain/nausea. 2 loose black stools today- 1 before and 1 after scope. Needed additional mag citrate this AM before procedure. Colonoscope today with intervention. Tolerated reg diet, minimal appetite. Pt refused echo and cognitive assessment. Up A1, gb, walker. Trend hgb q12h (stable at 9.2) now and monitor for any new bleeding overnight then discharge in AM if cleared by GI/hospitalist.

## 2021-03-19 NOTE — PLAN OF CARE
Patient discharged at 10:30 AM to Discharged to home  IV x2 was discontinued. Pain at time of discharge was 0/10. Belongings returned to patient.  Discharge instructions and medications reviewed with patient.  Patient verbalized understanding and all questions were answered.  Prescriptions sent to [facility] in [place].  At time of discharge, patient condition was stable and left the unit escorted by CNA.

## 2021-03-19 NOTE — PLAN OF CARE
A&O x4.VSS on RA. Pt refused 4 am vitals. Tele Afib CVR. Denied pain/nausea. Incontinent, 1 small black stool overnight, pt soiled brief but felt that he still had to go, bladder scan showed 45 mL in bladder. Reg diet. Pt did not get out of bed during shift, Up A1, gb, walker. Trend hgb q12h (stable at 9.5) now and no new bleeding overnight, possible discharge 3/19 in AM if cleared by GI/hospitalist.

## 2021-03-19 NOTE — PROGRESS NOTES
[facility]  Gastroenterology Progress Note     Antonio Ramirez MRN# [id number]   YOB: 1945 Age: 75 year old          Assessment and Plan:   Antonio Ramirez is a pleasant 75 year old male with alcoholic liver cirrhosis, on Eliquis for h/o PE. Presented with 3 days of melena and GI consulted with concerns for GI bleed on 3/17.     Principal Problem:    Gastrointestinal hemorrhage with melena    Alcoholic cirrhosis of liver (H)  Patient had 3 days of melena, nausea with dark colored vomit, takes Eliquis daily (last dose 3/16 a.m.). Has well documented alcoholic liver cirrhosis- despite continues to drink 3 alcoholic beverages daily.  Hemoglobin stable at 9.5 with baseline around 10-11.   3/17 EGD noted esophagitis, benign appearing esophageal stenosis no esophageal varices  3/18 Colonoscopy noted multiple bleeding colon angiodysplastic lesions- treated with bipolar cautery. Polyps noted- no specimens collected. Non bleeding rectal varices noted.     -- Hold Eliquis for 7 days and may restart on March 24th  -- Continue on oral pantoprazole 40mg daily for esophagitis  -- Ok with GI to discharge patient with plans for f/u as an outpatient in 1-2 weeks             Interval History:   no new complaints, denies chest pain, denies shortness of breath, denies abdominal pain, alert, oriented to person, place and time and doing well; no cp, sob, n/v/d, or abd pain.              Review of Systems:   C: NEGATIVE for fever, chills, change in weight  E/M: NEGATIVE for ear, mouth and throat problems  R: NEGATIVE for significant cough or SOB  CV: NEGATIVE for chest pain, palpitations or peripheral edema             Medications:   I have reviewed this patient's current medications    pantoprazole  40 mg Oral QAM AC                  Physical Exam:   Vitals were reviewed  Vital Signs with Ranges  Temp:  [96.1  F (35.6  C)-97.4  F (36.3  C)] 97.4  F (36.3  C)  Pulse:  [57-84] 61  Resp:  [0-21]  16  BP: ()/(53-72) 121/59  SpO2:  [94 %-100 %] 94 %  I/O last 3 completed shifts:  In: 500 [P.O.:500]  Out: 100 [Urine:100]  Constitutional: healthy, alert and no distress   Cardiovascular: negative, PMI normal. No lifts, heaves, or thrills. RRR. No murmurs, clicks gallops or rub  Respiratory: negative, Percussion normal. Good diaphragmatic excursion. Lungs clear  Head: Normocephalic. No masses, lesions, tenderness or abnormalities  Neck: Neck supple. No adenopathy. Thyroid symmetric, normal size,, Carotids without bruits.  Abdomen: Abdomen soft, non-tender. BS normal. No masses, organomegaly           Data:   I reviewed the patient's new clinical lab test results.   Recent Labs   Lab Test 03/19/21  0534 03/18/21  1933 03/18/21  0657 03/17/21  0655 03/17/21  0655 03/16/21 2258 12/07/20  1308 09/14/20  1150 08/10/20  1258   WBC  --   --   --   --  3.9* 4.4  --  3.3*  --    HGB 9.5* 9.5* 9.2*   < > 9.3* 10.3*  --  10.5*  --    MCV  --   --   --   --  100 98  --  84  --    PLT  --   --   --   --  85* 104* 101* 118* 77*   INR  --   --   --   --   --   --  1.54* 1.66* 1.38*    < > = values in this interval not displayed.     Recent Labs   Lab Test 03/17/21  0655 03/16/21 2258 04/26/20  0832   POTASSIUM 4.3 4.6 4.5   CHLORIDE 110* 108 106   CO2 26 26 25   BUN 44* 41* 23   ANIONGAP 6 7 5     Recent Labs   Lab Test 03/17/21  1300 03/17/21  0655 03/16/21 2258 03/03/21  1236 04/25/20  0857 06/11/19  0220 06/11/19  0220   ALBUMIN  --  2.8* 3.1*  --  2.3*   < >  --    BILITOTAL  --  1.1 1.5*  --  0.6   < >  --    ALT  --  18 21  --  12   < >  --    AST  --  32 36  --  25   < >  --    PROTEIN 10*  --   --  100*  --   --  10*    < > = values in this interval not displayed.       I reviewed the patient's new imaging results.    All laboratory data reviewed  All imaging studies reviewed by me.    Shirley Elizondo PA-C,  3/19/2021  [facility]  Office : [phone]  Cell: [phone] (  Yakov)  Cell: [phone] (Shirley Elizondo PA-C)

## 2021-03-19 NOTE — DISCHARGE SUMMARY
Discharge Summary  Hospitalist    Date of Admission:  3/16/2021  Date of Discharge:  3/19/2021  Discharging Provider: Xochitl Robles MD  Date of Service (when I saw the patient): 03/19/21    Discharge Diagnoses   Acute GI hemorrhage with blood loss anemia  due to bleeding colon Angiodysplastic lesions    History of Present Illness   Please refer H & P for details.      Hospital Course     Antonio Ramirez is a 75 year old male admitted on 3/16/2021. He presents with approximately 2 to 3 days of melenic stool as well as nausea and vomiting.     Acute GI hemorrhage with blood loss anemia due to bleeding colon Angiodysplastic lesions  Esophagitis  Rectal varices  : Hemoglobin in the 10 range, essentially stable from last September.  Reports approximately 3 days of melenic stool as well as gross hematuria.  Believes he has had some dark brown emesis as well.  Recent CT imaging demonstrated upper abdomen including esophageal varices, though this historically have not been noted on screening EGDs.  During April admission 4/16 upper endoscopy with AVMs noted in the stomach and jejunum, 4/17 colonoscopy with AVM in colon requiring argon plasma treatment, Endo Clip and epi injection.  -Gastroenterology consulted.  Patient follows with [facility] gastroenterology most recently.  -Protonix infusion started initially, later transitioned to p.o. Protonix after EGD  -Octreotide infusion initially given varices noted on recent CT imaging of the abdomen.  Later discontinued after EGD.  -Ceftriaxone 1 g every 12 hours for prophylaxis given history of cirrhosis with ascites and GI bleed started.  Later discontinued after EGD.  -Anticoagulation held.  History of pulmonary embolism with positive lupus inhibitor, though also with cirrhosis and history of GI bleeds requiring blood transfusions.  See most recent consultation notes from [place] [facility] 4/24/2020 during hospitalization for GI bleed regarding discontinuation of  anticoagulation if recurrent bleeding event.  -Underwent EGD on 3/17 with no source identified.  Subsequently underwent colonoscopy on 3/18 which did show multiple bleeding colonic angiodysplastic lesions with culprit lesions felt to be in the cecum.  These were treated with bipolar cautery.  Also noted were rectal varices most likely due to portal hypertension.  - Per GI, may resume anticoagulation after a week understanding risks still involved.  -Hb remained stable in the upper nines and patient had no further bleeding.  -At discharge, patient is advised to restart taking his Eliquis on 3/27/2021 [after 7 days after discharge].  He is also prescribed daily nadolol given underlying portal hypertension per recommendation from Dr. Nagy.  -Resumed PTA aspirin.  Continue on PPI given esophagitis on EGD.    Gross hematuria: Patient reports gross hematuria.  History of prostate cancer status post brachytherapy.  History of nonobstructive renal stones with recent CT imaging.  Thought to be secondary to possible endothelial injury with known renal stones in the setting of chronic anticoagulation.  -Monitor intake and output  -Anticoagulation on hold as above.  - Negative urine culture.  -[place] [facility] consult; please see Note: recommended urine cytology result is negative for any high-grade urothelial malignancy]; cystoscopy as outpatient.     CKD stage III: Baseline creatinine in the 1.5 range, though currently 1.09.  Question if these prior renal panels were actually associated with some cardiorenal syndrome with his severe pulmonary hypertension.  More historical creatinine elevations in the 1.6 range during last hospitalization was also at the time when patient was severely edematous, which is no longer the case.  -Creatinine remained stable.    Alcohol abuse history:   Alcoholic cirrhosis of the liver with portal hypertension.  Patient reports that he continues to drink 3 beverages per day.  Negative ethanol  level on admission.  Somewhat tangential in his history. At last admission was recommended for TCU  -Occupational Therapy consulted for cognitive assessment per Admitting Hospitalist.  Patient declined this.  -Monitor for S/S acute alcohol withdrawal.  Did not have any issues with alcohol withdrawal here.  -Prescribed nadolol daily as recommended by Dr. Nagy for portal hypertension.  Follow-up with GI clinic in 2 weeks.     Severe pulmonary hypertension:  Moderate aortic stenosis: Harsh systolic murmur across precordium.  Last TTE in September 2019 with valve area 1.5 cm at that time  -cardiac telemetry.  Patient refused TTE here.     Coronary artery disease: Status post coronary artery bypass grafting in 2007, 2018.  Chronic systolic heart failure not currently in acute exacerbation: Severe hypokinesis of distal inferior wall with ejection fraction in the 40 to 45% range as of September 2019 echocardiogram  -prior to admission aspirin 81 mg on hold; anticoagulation on hold.  Resumed aspirin at discharge.  Resume Eliquis on 3/27/2021.  -Note statin intolerance     Paroxysmal atrial fibrillation:  -Anticoagulation to be discontinued  -Cardiac telemetry     Prolonged QT: Noted in the emergency department following Zofran administered by EMS  -Avoid QT prolonging agents  -Cardiac telemetry     History of diffuse large B-cell lymphoma:   -Continue outpatient monitoring through oncology    Xochitl Robles MD, MD      Pending Results   These results will be followed up by Hospitalist team.  Unresulted Labs Ordered in the Past 30 Days of this Admission     No orders found from 2/14/2021 to 3/17/2021.          Code Status   Full Code       Primary Care Physician   Main Hardy    Follow-ups Needed After Discharge   Follow-up Appointments     Follow-up and recommended labs and tests       Follow up with primary care provider, Main Hardy, within 7 days for   hospital follow- up.  The following labs/tests are  recommended: Hemoglobin   in 5 to 7 days prior to PCP visit.  Follow-up with GI clinic in 2 weeks Dr Yakov RAMIREZ [phone]             Physical Exam   Temp: 97.4  F (36.3  C) Temp src: Oral BP: 121/59 Pulse: 61   Resp: 16 SpO2: 94 % O2 Device: None (Room air)    Vitals:    03/16/21 2242 03/17/21 0655 03/18/21 0651   Weight: 90.7 kg (200 lb) 89.7 kg (197 lb 11.2 oz) 90 kg (198 lb 6.4 oz)     Vital Signs with Ranges  Temp:  [96.8  F (36  C)-97.4  F (36.3  C)] 97.4  F (36.3  C)  Pulse:  [57-84] 61  Resp:  [16] 16  BP: ()/(53-59) 121/59  SpO2:  [94 %-98 %] 94 %  I/O last 3 completed shifts:  In: 500 [P.O.:500]  Out: 100 [Urine:100]    General/Constitutional:   NAD, alert, no distress, cooperative  HEENT/Head Exam:  atraumatic  Chest/Respiratory:  Air exchange bilateral lung fields; no rales or wheeze. Respiration nonlabored.  Cardiovascular: Systolic murmur appreciated.  LE edema none; L LE prosthesis  Gastrointestinal/Abdomen:  soft, nontender,  no rebound, guarding or other peritoneal signs.  Neurologic:  gross CN and motor testing nonfocal.  Able to engage in appropriate conversation  Psychiatric:  Mental status:  Alert, oriented, appears to lack insight into his medical conditions  Skin:  No rash noted on gross exam            Discharge Disposition   Discharged to home  Condition at discharge: Stable    Consultations This Hospital Stay   GASTROENTEROLOGY IP CONSULT  UROLOGY IP CONSULT  OCCUPATIONAL THERAPY ADULT IP CONSULT  CARE MANAGEMENT / SOCIAL WORK IP CONSULT    Time Spent on this Encounter   IXochitl MD, personally saw the patient today and spent greater than 30 minutes discharging this patient.    Discharge Orders      Reason for your hospital stay    You were hospitalized with GI bleed.     Follow-up and recommended labs and tests     Follow up with primary care provider, Main Hardy, within 7 days for hospital follow- up.  The following labs/tests are recommended: Hemoglobin in 5 to 7  "days prior to PCP visit.  Follow-up with GI clinic in 2 weeks Dr Nagy [phone]     Activity    Your activity upon discharge: activity as tolerated     When to contact your care team    Call your primary doctor if you have any of the following: Worsening lightheadedness, dizziness, shortness of breath, chest pain, nausea, vomiting, abdominal pain, black or bloody stool or blood in vomit     Discharge Instructions    You have been recommended to hold your Eliquis for additional 1 week after discharge.  You can resume taking it on 3/27/2021.     Full Code     Diet    Follow this diet upon discharge: Orders Placed This Encounter      Snacks/Supplements Adult: Other; chocolate Boost with dinner; With Meals      Regular Diet Adult     Discharge Medications   Discharge Medication List as of 3/19/2021 10:26 AM      START taking these medications    Details   nadolol (CORGARD) 20 MG tablet Take 1 tablet (20 mg) by mouth daily, Disp-30 tablet, R-0, E-Prescribe      pantoprazole (PROTONIX) 40 MG EC tablet Take 1 tablet (40 mg) by mouth every morning (before breakfast), Disp-30 tablet, R-0, E-Prescribe         CONTINUE these medications which have CHANGED    Details   apixaban ANTICOAGULANT (ELIQUIS) 2.5 MG tablet Take 1 tablet (2.5 mg) by mouth daily Hold this medication for 1 week after discharge.  Restart taking it on 3/27/2021., Disp-90 tablet, R-3, No Print Out         CONTINUE these medications which have NOT CHANGED    Details   aspirin 81 MG EC tablet Take 81 mg by mouth daily, Historical           Allergies   Allergies   Allergen Reactions     Blood Transfusion Related (Informational Only) Other (See Comments)     Patient has a history of a clinically significant antibody against RBC antigens.  A delay in compatible RBCs may occur.     Kdc:Minocycline+[place] [facility] GI Disturbance     11/01/16-PT IS NOT AWARE OF THIS REACTION     Ciprofloxacin Itching     Severe itching \"like ants were crawling\"     " Hmg-Coa-R Inhibitors Other (See Comments)     Rhabdomyolysis occurred within a couple days  Rhabdomyolysis     Data   Most Recent 3 CBC's:  Recent Labs   Lab Test 03/19/21  0534 03/18/21  1933 03/18/21  0657 03/17/21  0655 03/17/21  0655 03/16/21  2258 12/07/20  1308 09/14/20  1150   WBC  --   --   --   --  3.9* 4.4  --  3.3*   HGB 9.5* 9.5* 9.2*   < > 9.3* 10.3*  --  10.5*   MCV  --   --   --   --  100 98  --  84   PLT  --   --   --   --  85* 104* 101* 118*    < > = values in this interval not displayed.      Most Recent 3 BMP's:  Recent Labs   Lab Test 03/17/21  0655 03/16/21  2258 04/26/20  0832    141 136   POTASSIUM 4.3 4.6 4.5   CHLORIDE 110* 108 106   CO2 26 26 25   BUN 44* 41* 23   CR 1.20 1.09 1.64*   ANIONGAP 6 7 5   BENNIE 9.0 9.6 8.1*   GLC 70 84 83     Most Recent 2 LFT's:  Recent Labs   Lab Test 03/17/21  0655 03/16/21  2258   AST 32 36   ALT 18 21   ALKPHOS 86 97   BILITOTAL 1.1 1.5*     Most Recent INR's and Anticoagulation Dosing History:  Anticoagulation Dose History     Recent Dosing and Labs Latest Ref Rng & Units 4/9/2020 4/14/2020 4/16/2020 5/18/2020 8/10/2020 9/14/2020 12/7/2020    INR 0.86 - 1.14 1.40(H) 1.37(H) 1.48(H) 1.26(H) 1.38(H) 1.66(H) 1.54(H)    Chromogenic Factor 10 70 - 130 % - - - - - - -        Most Recent 3 Troponin's:  Recent Labs   Lab Test 06/11/19  1029 06/11/19  0710 02/28/19  1625   TROPI 0.433* 0.027 0.030     Most Recent Cholesterol Panel:No lab results found.  Most Recent 6 Bacteria Isolates From Any Culture (See EPIC Reports for Culture Details):  Recent Labs   Lab Test 03/17/21  1300 03/03/21  1236 12/02/19  1350 06/11/19  0955 06/11/19  0303 06/11/19  0220   CULT No growth >100,000 colonies/mL  mixed urogenital aranza  Susceptibility testing not routinely done   No growth Moderate growth  Normal aranza   No growth Cultured on the 1st day of incubation:  Staphylococcus simulans  *  Critical Value/Significant Value, preliminary result only, called to and read back  by   Zari Camacho RN. 6/11/19 @ 2120, ARSENR    (Note)  POSITIVE for Staphylococci other than S.aureus, S.epidermidis and  S.lugdunensis, by [employer] multiplex nucleic acid test.  Coagulase-negative staphylococci are the most common venipuncture or  collection associated skin CONTAMINANTS grown in blood cultures.  Final identification and antimicrobial susceptibility testing will be  verified by standard methods.    Specimen tested with [employer] multiplex, gram-positive blood culture  nucleic acid test for the following targets: Staph aureus, Staph  epidermidis, Staph lugdunensis, other Staph species, Enterococcus  faecalis, Enterococcus faecium, Streptococcus species, S. agalactiae,  S. anginosus grp., S. pneumoniae, S. pyogenes, Listeria sp., mecA  (methicillin resistance) and Rad/B (vancomycin resistance).    Critical Value/Significant Value called to and read back by  Mariana Goddard RN on 06.11.2019 at 2359.  JRT    >100,000 colonies/mL  mixed urogenital aranza  Susceptibility testing not routinely done       Most Recent TSH, T4 and A1c Labs:  Recent Labs   Lab Test 10/31/18  1742 05/18/18  1641 05/18/18  1641   TSH 0.86   < >  --    A1C  --   --  4.6    < > = values in this interval not displayed.       Results for orders placed or performed during the hospital encounter of 03/16/21   XR Chest 2 Views    Narrative    EXAM: XR CHEST 2 VW  LOCATION: [facility]  DATE/TIME: 3/16/2021 11:25 PM    INDICATION: GI bleeding  COMPARISON: 04/15/2020      Impression    IMPRESSION: Minimal interval decrease in size of the right-sided pleural fluid collection. Minimal persistent atelectasis right lung base. Minimal linear atelectasis or scarring left lung base. No left-sided pleural fluid. Sternotomy. Mediastinal clips.   Normal heart size and pulmonary vascularity. Atherosclerotic vascular calcification. Degenerative changes in the spine and shoulders.

## 2021-03-19 NOTE — TELEPHONE ENCOUNTER
----- Message from Rosie Lindquist PA-C sent at 3/17/2021  9:34 AM CDT -----  Regarding: cysto  Cancel appt with me, needs cysto appt with 1st avail in [place], gross hematuria, hx of prostate cancer and stones. Transferring from [facility]

## 2021-04-01 PROBLEM — R00.1 BRADYCARDIA: Status: ACTIVE | Noted: 2021-01-01

## 2021-04-01 PROBLEM — N17.9 ACUTE RENAL FAILURE, UNSPECIFIED ACUTE RENAL FAILURE TYPE (H): Status: ACTIVE | Noted: 2021-01-01

## 2021-04-01 PROBLEM — E87.5 HYPERKALEMIA: Status: ACTIVE | Noted: 2021-01-01

## 2021-04-01 NOTE — H&P
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  He now comes back to the ER with complaint of generalized weakness, fatigue, tiredness and bleeding per rectum for the last 3-4 days.      ASSESSMENT AND PLAN:   1.  Acute lower gastrointestinal bleeding:  This is a 76-year-old male with history of GI bleeding in the recent past who was admitted earlier in 03/2021.  He was on Eliquis.  He has an EGD on 03/16, which shows esophageal stenosis benign, esophagitis, portal gastropathy and normal duodenum.  Colonoscopy shows multiple angiodysplastic lesions with bleeding in cecum.  Multiple diverticula in sigmoid region and rectal varices.  Was not bleeding.  He was treated with cautery at that time.  He was asked to hold anticoagulation for about a week.  He did, and now he is back on anticoagulation and bleeding again.  Although his hemoglobin is stable, he slightly hypertensive at this time and bradycardic.  We will admit him and start him on IV fluids and IV Protonix 40 b.i.d.  We will start him on low-dose octreotide as well at 25 mcg per hour at this time.  Consult GI to evaluate the patient.  Continue to hold his Eliquis.  Continue Protonix 40 b.i.d.  GI will be consulted.  Most likely will need colonoscopy again to achieve hemostasis.  Most likely bleeding again from his angiodysplastic lesion.  Cannot rule out rectal varices bleeding, so we will keep him on octreotide for now  as well.   2.  Acute on chronic renal failure, stage III:  The patient had chronic kidney disease, stage III, with baseline creatinine in the 1.3 to 1.6 range.  It has now increased to 6.83.  He is not having significant vomiting or diarrhea.  He only uses occasionally ibuprofen.  He is slightly hypotensive, though.  The cause of this significant rise in creatinine is uncertain at this time.  This may be hepatorenal syndrome versus ATN as not eating and drinking well.  I will go ahead and check FENA and ultrasound of the kidneys to rule out any hydronephrosis given history of prostate cancer in the past, check CPK level.  The bladder is empty on my examination, although abdomen is distended with some ascites.  We will start him on IV fluids, bicarbonate infusion drip at 100 mL per hour.  I will ask Nephrology to see him.  We will check FENA an ultrasound of kidneys to rule out any hydronephrosis.  Further workup as per Nephrology if needed.   3.  Hyperkalemia:  This is again secondary to acute renal failure, not making urine, almost anuric at this time.  Keep him on IV fluids.  He received active treatment with IV calcium gluconate, IV insulin and dextrose.  We will put him on IV bicarbonate infusion drip at this time.   4.  Atrial fibrillation with Slow Ventricular response.  Bradycardia.  The patient is on nadolol.  His heart rate was low at 38, now improved to 40s.  Blood pressure is in upper 90s to low 100s.  Will keep him on IV fluids.  Hold nadolol.  Calcium gluconate is given.  EKG does not show any changes of hyperkalemia at this time.   5.  Alcohol abuse and alcohol liver disease and possible acute pancreatitis:  His LFTs are normal, but he has continued to drink alcohol. His Lipase slightly elevated,   He seems like he has some ascites as well.  I counseled him about stopping alcohol.  We will need to keep him on alcohol withdrawal protocol.  We will check CIWA and give him lorazepam according to CIWA if  needed. Keep NPO for now and continue IV fluids at this time.  6.  History of ischemic cardiomyopathy:  Does not look like florid exacerbation.  Will have to hold his nadolol.  He is not on too many medications for ischemic cardiomyopathy as he was to start with.   7.  Paroxysmal atrial fibrillation/anticardiolipin antibody syndrome, history of PE in the past:  As per record, he may have anticardiolipin antibody, but he is on Eliquis 2.5 mg daily only.  It is causing more complications then benefits at this point.  We need to readdress about his anticoagulation as he is unable to tolerate even the lowest dose of 2.5 mg daily.  If he needs to be treated for anticardiolipin antibody syndrome, he may need to be on Coumadin, but I think he has been seen in the past by Oncology/Hematology.  May need to consider consulting them again if needed during this hospitalization.   8.  History of esophagitis:  On Protonix as mentioned above.   9.  Hyponatremia:  Again, I think it is most likely because of renal failure.  May improve with IV fluids for now.   10.  Coronary artery disease:  Status post bypass surgery, stable at this time.   11.  History of diffuse large B-cell lymphoma:  Needs outpatient monitoring at this time.      CODE STATUS:  Full code.      I had a detailed discussion with the patient regarding his code status, and he wanted to be full code.      The case discussed with ER physician and the nursing staff taking care of the patient.      Total time spent today was more than 50 minutes of critical care time, which included chart review, history taking, physical examination, formation of the plan, counseling and coordination of care.         DENIS DONG MD               DVT Prophylaxis: Pneumatic Compression Devices  Code Status: Full Code    Disposition: Expected discharge in 2 days once stable.    Denis Dong MD    Primary Care Physician   [facility]    Chief Complaint   Weakness, bleeding per rectum.      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  He now comes back to the ER with complaint of generalized weakness, fatigue, tiredness and bleeding per rectum for the last 3-4 days.      According to the patient, since he has been discharged from the hospital, he has been feeling weak and fatigued and is mostly wheelchair bound.  He held his Eliquis for 1 week and start taking it again on last Saturday, 03/27, and he noticed that he started having bright red blood per rectum on the second day, that for the last 3-4 days now.  He is having about a half a cup of bright red blood with each bowel movement 1-2 times per day, and he also noticed that for about 1 week, he has no urine output, is not making much urine.  He is feeling weak, tired and nauseated with off and on vomiting as well.  The patient denies any fall, chest pain, shortness of breath, orthopnea, PND, palpitation.  No dysuria, hematuria, constipation or diarrhea.      The patient also continues to drink vodka, 3 drinks daily.      In the ER, he was evaluated.  He was hypotensive, bradycardic.  His creatinine increased from 1.2 and when he was discharged to 6.83.  Potassium was elevated to 6.2.  Hemoglobin was stable, so the Hospitalist Service was consulted to admit the patient.     Past Medical History    I have reviewed this patient's medical history and updated it with pertinent information if needed.   Past Medical  History:   Diagnosis Date     Alcoholism (H)      Amputated left leg (H)      Anemia      Anticardiolipin antibody syndrome (H)      Antiphospholipid antibody syndrome (H)      Antiplatelet or antithrombotic long-term use      Aortic root dilatation (H)      Aortic stenosis      Arthritis      Balance problem      Coronary artery disease     CABG 2007: SVG to LAD, SVG to diagonal, SVG to OM; cardiac cath 5/17/18: thrombectomy for restenosis of stents-sent for urgent CABG, cath 5/14/2007: GRECIA x2 to LAD, [place]ia to diagonal     Gastro-oesophageal reflux disease      Heart attack (H) 2007    apparently arrested, cardiac X5     Hiatal hernia      Hypercholesterolemia      Hypertension      Ischemic cardiomyopathy      Left foot drop      Liver disease     alcoholic liver disease, alcoholic cirrohsosis, portal hypertension     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)     ?When diagnosed, patient not aware of this     Moderate mitral regurgitation      Monoclonal gammopathy      Neuropathy      Noninfectious ileitis     diverticulitis of colon     Nonrheumatic tricuspid valve regurgitation      Paroxysmal atrial fibrillation (H)      PE (pulmonary embolism) 2006    saddle emboli 2006     Prostate cancer (H) 2000    Treated with radiation (seed implants in 2000) -- no problems since     Pulmonary hypertension (H)      Renal disease     kidney stones     Syncope      Thrombocytopenia (H)      Urethral stricture      Venous thrombosis     IVC filter and lysis procedures 2007       Past Surgical History   I have reviewed this patient's surgical history and updated it with pertinent information if needed.  Past Surgical History:   Procedure Laterality Date     AMPUTATE LEG BELOW KNEE Left 04/2014    related to gangrene, started as foot ulcer related to neuropathy     AMPUTATE LEG BELOW KNEE Left 12/4/2017    Procedure: AMPUTATE LEG BELOW KNEE;  Exploration of Left Leg Below Knee Amputation Stump with Ligation of  Jung.;  Surgeon: Leandro Arreola MD;  Location:  OR     APPENDECTOMY       APPLY WOUND VAC Left 12/6/2017    Procedure: APPLY WOUND VAC;;  Surgeon: Saima Howard MD;  Location:  SD     ARTHROPLASTY HIP Right 11/27/2018    Procedure: RIGHT TOTAL HIP ARTHROPLASTY;  Surgeon: Saima Howard MD;  Location:  OR     ARTHROPLASTY KNEE Right 9/19/2014    Procedure: ARTHROPLASTY KNEE;  Surgeon: Saima Howard MD;  Location:  OR     CARDIAC SURGERY      CABG     CHOLECYSTECTOMY       COLONOSCOPY       COLONOSCOPY N/A 4/1/2020    Procedure: COLONOSCOPY;  Surgeon: Emeka Freeman DO;  Location:  GI     COMBINED CYSTOSCOPY, RETROGRADES, EXCHANGE STENT URETER(S) Left 7/24/2018    Procedure: COMBINED CYSTOSCOPY, RETROGRADES, EXCHANGE STENT URETER(S);  CYSTOSCOPY, BILATERAL RETROGRADES, BILATERAL URETERAL STENT EXCHANGE ;  Surgeon: Matt Cervantes MD;  Location:  OR     CRANIOTOMY Right 4/7/2018    Procedure: CRANIOTOMY;  Right Craniotomy For Subdural Hematoma;  Surgeon: Jono Issa MD;  Location:  OR     CYSTOSCOPY, DILATE URETHRA, COMBINED N/A 10/12/2016    Procedure: COMBINED CYSTOSCOPY, DILATE URETHRA;  Surgeon: Dimitry Bello MD;  Location:  OR     CYSTOSCOPY, REMOVE STENT(S), COMBINED Right 7/24/2018    Procedure: COMBINED CYSTOSCOPY, REMOVE STENT(S);;  Surgeon: Jose Hunter MD;  Location:  OR     ENDOSCOPIC STRIPPING VEIN(S)       ENTEROSCOPY SMALL BOWEL N/A 4/8/2020    Procedure: ENTEROSCOPY;  Surgeon: Katherin Boyd MD;  Location:  OR     esophagogastroduodenoscopy       ESOPHAGOSCOPY, GASTROSCOPY, DUODENOSCOPY (EGD), COMBINED N/A 3/11/2019    Procedure: COMBINED ESOPHAGOSCOPY, GASTROSCOPY, DUODENOSCOPY (EGD), BIOPSY SINGLE OR MULTIPLE;  Surgeon: Katherin Boyd MD;  Location:  GI     ESOPHAGOSCOPY, GASTROSCOPY, DUODENOSCOPY (EGD), COMBINED N/A 9/27/2019    Procedure: ESOPHAGOGASTRODUODENOSCOPY, WITH FOREIGN BODY REMOVAL;   Surgeon: Raegan Mckeon MD;  Location:  GI     ESOPHAGOSCOPY, GASTROSCOPY, DUODENOSCOPY (EGD), COMBINED N/A 4/1/2020    Procedure: ESOPHAGOGASTRODUODENOSCOPY (EGD);  Surgeon: Emeka Freeman DO;  Location:  GI     ESOPHAGOSCOPY, GASTROSCOPY, DUODENOSCOPY (EGD), COMBINED N/A 4/9/2020    Procedure: Esophagoscopy, gastroscopy, duodenoscopy (EGD), combined;  Surgeon: Katherin Boyd MD;  Location:  GI     ESOPHAGOSCOPY, GASTROSCOPY, DUODENOSCOPY (EGD), COMBINED N/A 3/17/2021    Procedure: ESOPHAGOGASTRODUODENOSCOPY (EGD);  Surgeon: Rahul Nagy MD;  Location:  GI     EYE SURGERY      cataracts     GENITOURINARY SURGERY      Prostate Seen Implants     HERNIA REPAIR      Umbilical     INCISION AND DRAINAGE LOWER EXTREMITY, COMBINED Left 12/1/2017    Procedure: COMBINED INCISION AND DRAINAGE LOWER EXTREMITY;  INCISION AND DRAINAGE OF LEFT BELOW KNEE AMPUTATION ABSCESS, WOUND VAC PLACEMENT;  Surgeon: Saima Howard MD;  Location:  OR     IR IVC FILTER PLACEMENT       IR VISCERAL ANGIOGRAM  4/14/2020     IRRIGATION AND DEBRIDEMENT LOWER EXTREMITY, COMBINED Left 12/6/2017    Procedure: COMBINED IRRIGATION AND DEBRIDEMENT LOWER EXTREMITY;  IRRIGATION AND DEBRIDEMENT OF LEFT BELOW KNEE AMPUTATION SITE WITH WOUND VAC REPLACEMENT;  Surgeon: Saima Howard MD;  Location:  SD     IRRIGATION AND DEBRIDEMENT LOWER EXTREMITY, COMBINED Left 12/8/2017    Procedure: COMBINED IRRIGATION AND DEBRIDEMENT LOWER EXTREMITY;  IRRIGATION AND DEBRIDEMENT OF LEFT BELOW THE KNEE AMPUTATION AND SHORTENING OF THE TIBIA WITH WOUND CLOSURE;  Surgeon: Saima Howard MD;  Location:  OR     LASER HOLMIUM LITHOTRIPSY URETER(S), INSERT STENT, COMBINED Bilateral 6/28/2018    Procedure: COMBINED CYSTOSCOPY, URETEROSCOPY, LASER HOLMIUM LITHOTRIPSY URETER(S), INSERT STENT;  CYSTOSCOPY, BILATERAL URETEROSCOPY,  HOLMIUM LASER LITHOTRIPSY, BILATERAL STENT PLACEMENT, STONE BASKETING;  Surgeon: Jose Hunter  "MD Kyle;  Location:  OR     LASER HOLMIUM LITHOTRIPSY URETER(S), INSERT STENT, COMBINED Left 8/14/2018    Procedure: COMBINED CYSTOSCOPY, URETEROSCOPY, LASER HOLMIUM LITHOTRIPSY URETER(S), INSERT STENT;  CYSTOSCOPY, LEFT URETEROSCOPY, LEFT LASER HOLMIUM LITHOTRIPSY URETER(S) REMOVAL BILATERAL STENTS;  Surgeon: Jose Hunter MD;  Location:  OR     ORTHOPEDIC SURGERY      (R) knee scope     ORTHOPEDIC SURGERY      total hip      ORTHOPEDIC SURGERY      elbow surgery       Prior to Admission Medications   Prior to Admission Medications   Prescriptions Last Dose Informant Patient Reported? Taking?   apixaban ANTICOAGULANT (ELIQUIS) 2.5 MG tablet 3/31/2021 at Unknown time  No Yes   Sig: Take 1 tablet (2.5 mg) by mouth daily Hold this medication for 1 week after discharge.  Restart taking it on 3/27/2021.   aspirin 81 MG EC tablet 3/31/2021 at Unknown time Self Yes Yes   Sig: Take 81 mg by mouth daily   nadolol (CORGARD) 20 MG tablet   No No   Sig: Take 1 tablet (20 mg) by mouth daily   pantoprazole (PROTONIX) 40 MG EC tablet   No No   Sig: Take 1 tablet (40 mg) by mouth every morning (before breakfast)      Facility-Administered Medications: None     Allergies   Allergies   Allergen Reactions     Blood Transfusion Related (Informational Only) Other (See Comments)     Patient has a history of a clinically significant antibody against RBC antigens.  A delay in compatible RBCs may occur.     Kdc:Minocycline+[place] [facility] GI Disturbance     11/01/16-PT IS NOT AWARE OF THIS REACTION     Ciprofloxacin Itching     Severe itching \"like ants were crawling\"     Hmg-Coa-R Inhibitors Other (See Comments)     Rhabdomyolysis occurred within a couple days  Rhabdomyolysis       Social History   I have reviewed this patient's social history and updated it with pertinent information if needed. Antonio Ramirez  reports that he has never smoked. He has never used smokeless tobacco. He reports current alcohol use. He reports " that he does not use drugs.    Family History   I have reviewed this patient's family history and updated it with pertinent information if needed.   Family History   Problem Relation Age of Onset     Lung Cancer Mother      Heart Failure Father          age 87       Review of Systems   CONSTITUTIONAL:  positive for  fatigue, malaise and anorexia  EYES:  negative  HEENT:  negative  RESPIRATORY:  negative  CARDIOVASCULAR:  negative  GASTROINTESTINAL:  positive for nausea and hemtochezia  GENITOURINARY:  positive for decrease urination   INTEGUMENT/BREAST:  negative  HEMATOLOGIC/LYMPHATIC:  negative  ALLERGIC/IMMUNOLOGIC:  negative  ENDOCRINE:  negative  MUSCULOSKELETAL:  negative  NEUROLOGICAL:  negative  BEHAVIOR/PSYCH:  negative    Physical Exam   Temp: 97  F (36.1  C) Temp src: Oral BP: 99/55 Pulse: (!) 43   Resp: 13 SpO2: 98 % O2 Device: None (Room air)    Vital Signs with Ranges  Temp:  [97  F (36.1  C)] 97  F (36.1  C)  Pulse:  [42-51] 43  Resp:  [13-16] 13  BP: ()/(39-55) 99/55  SpO2:  [97 %-98 %] 98 %  200 lbs 0 oz    Constitutional: Awake, alert, cooperative, no apparent distress.  Eyes: Conjunctiva and pupils examined and normal.  HEENT: dry mucous membranes, normal dentition.  Respiratory: Clear to auscultation bilaterally, no crackles or wheezing.  Cardiovascular: Regular rate and rhythm, normal S1 and S2, and no murmur noted.  GI: Soft, mildly distended, non-tender, SD positive, ascites positive, normal bowel sounds.  Lymph/Hematologic: No anterior cervical or supraclavicular adenopathy.  Skin: No rashes, no cyanosis, trace edema.  Musculoskeletal: No joint swelling, erythema or tenderness.left BKA  Neurologic: Cranial nerves 2-12 intact, normal strength and sensation.  Psychiatric: Alert, oriented to person, place and time, no obvious anxiety or depression.    Data   Data reviewed today:  I personally reviewed the EKG tracing showing Afib with slow ventricular response with HR of 38.  Recent  Labs   Lab 04/01/21  1206   WBC 4.9   HGB 9.3*   *      *   POTASSIUM 6.2*   CHLORIDE 97   CO2 20   BUN 83*   CR 6.83*   ANIONGAP 10   BENNIE 8.8   *   ALBUMIN 3.3*   PROTTOTAL 7.3   BILITOTAL 0.6   ALKPHOS 93   ALT 20   AST 32   LIPASE 1,294*       No results found for this or any previous visit (from the past 24 hour(s)).

## 2021-04-01 NOTE — Clinical Note
Temporary pacemaker Rate= 60bpmPaced mA= 5Pacer wire was captured appropriately, Pacer is set, Demand on Standby and Pacing catheter was left in place

## 2021-04-01 NOTE — PHARMACY-ADMISSION MEDICATION HISTORY
Pharmacy Medication History  Admission medication history interview status for the 4/1/2021  admission is complete. See EPIC admission navigator for prior to admission medications     Location of Interview: Phone  Medication history sources: Patient, Surescripts and recent d/c    Significant changes made to the medication list:  Removed Nadolol and Protonix as pt states he was told to stop, please review with patient.    In the past week, patient estimated taking medication this percent of the time: greater than 90%    Medication reconciliation completed by provider prior to medication history? No    Time spent in this activity: 10 minutes    Prior to Admission medications    Medication Sig Last Dose Taking? Auth Provider   apixaban ANTICOAGULANT (ELIQUIS) 2.5 MG tablet Take 1 tablet (2.5 mg) by mouth daily Hold this medication for 1 week after discharge.  Restart taking it on 3/27/2021. 3/31/2021 at Unknown time Yes Xochitl Robles MD   aspirin 81 MG EC tablet Take 81 mg by mouth daily 3/31/2021 at Unknown time Yes Reported, Patient   nadolol (CORGARD) 20 MG tablet Take 1 tablet (20 mg) by mouth daily   Xochitl Robles MD   pantoprazole (PROTONIX) 40 MG EC tablet Take 1 tablet (40 mg) by mouth every morning (before breakfast)   Xochitl Robles MD       The information provided in this note is only as accurate as the sources available at the time of update(s)

## 2021-04-01 NOTE — ED PROVIDER NOTES
History   Chief Complaint:  Rectal Bleeding       HPI   Antonio Ramirez is a 76 year old male with an extensive medical history including pulmonary embolism, prostate cancer, CKD, CAD, paroxysmal atrial fibrillation on Eliquis who presents with rectal bleeding. The patient says that he has been having rectal bleeding for the past few days with bright red blood mixed with stools. He says that this had happened 2 weeks prior and was found to have a GI hemorrhage on 3/16. The patient endorses weakness. He says that he has not taken Eliquis since yesterday. He denies any falls, head injuries, headache, or abdominal pain.  Patient states that he has not been drinking much water or staying hydrated.  He states he drinks 1 soda per day and 3 alcoholic beverages.  He states he has not urinated since yesterday.  Patient denies chest pain or shortness of breath.  He denies abdominal pain, nausea, vomiting, diarrhea.      Review of Systems   Gastrointestinal: Positive for blood in stool.   Neurological: Positive for weakness. Negative for headaches.   All other systems reviewed and are negative.        Allergies:  Blood Transfusion Related (Informational Only)  Kdc:Minocycline+Brilliant Blue Fcf  Ciprofloxacin  Hmg-Coa-R Inhibitors    Medications:  Eliquis  Aspirin  Corgard  Protonix     Past Medical History:    Venous thrombosis  Urethral stricture  Thrombocytopenia  Renal disease  Pulmonary hypertension   Prostate cancer  PE  paroxysmal atrial fibrillation  Non rheumatic tricuspid regurgitation   Noninfectious ileitis    Neuropathy  Monoclonal gammopathy  Moderate mitral regurgitation  Low grade B cell lymphoproliferative disorder  Liver disease  Left foot drop  Ischemic cardiomyopathy  Hypertension    Hypercholesterolemia   Hiatal hernia  Heart attack  GERD  CAD  Arthritis   Aortic stenosis  Aortic root dilatation  Antiphospholipid antibody syndrome  Anticardiolipin antibody syndrome   Anemia  Amputated left  "leg  Alcoholism   GI bleed  Gastrointestinal hemorrhage with melena   Complete immobility   Phlebitis and thrombophlebitis of superficial vessels of lower extremities   Skin ulcer  CKD   PVD  Thoracic aortic aneurysm      Past Surgical History:    Elbow surgery  Total hip surgery  Knee scope  Laser holmium lithotripsy ureter, insert stent, combined x2   Irrigation and debridement x2  IR visceral angiogram  IR IVC filter placement  Incision and drainage lower extremity   Umbilical hernia repair  Genitourinary implants   Cataract surgery   EGD x5  Enteroscopy small bowel  Cystoscopy remove stents  Cystoscopy dilate urethra  Craniotomy  Combined cystoscopy, retrogrades, exchange stent ureter  Colonoscopy x2  Cholecystectomy  CABG  Arthroplasty knee  Appendectomy  Amputate leg below knee x2      Family History:    Lung cancer   Heart failure     Social History:  Patient presents to the ED alone.    Physical Exam     Patient Vitals for the past 24 hrs:   BP Temp Temp src Pulse Resp SpO2 Height Weight   04/01/21 1245 -- -- -- (!) 43 13 98 % -- --   04/01/21 1230 99/55 -- -- (!) 42 -- 97 % -- --   04/01/21 1215 110/54 -- -- -- -- -- -- --   04/01/21 1156 (!) 104/39 97  F (36.1  C) Oral 51 16 97 % 1.854 m (6' 1\") 90.7 kg (200 lb)       Physical Exam  General: Patient is alert, pale, weak  HEENT: Head atraumatic    Eyes: pupils equal and reactive. Conjunctiva clear   Nares: patent   Oropharynx: no lesions, uvula midline, no palatal draping, normal voice, no trismus  Neck: Supple without lymphadenopathy, no meningismus  Chest: Heart regular rate and rhythm.   Lungs: Equal clear to auscultation with no wheeze or rales  Abdomen: Soft, non tender, nondistended, normal bowel sounds  Back: No costovertebral angle tenderness, no midline C, T or L spine tenderness  Neuro: Grossly nonfocal, normal speech, strength equal bilaterally, CN 2-12 intact  Extremities: No deformities, equal radial and DP pulses. No clubbing, cyanosis.  " Bilateral lower extremity edema  Skin: Warm and dry with no rash.       Emergency Department Course     ECG  ECG taken at 1250, ECG read at 1305  Atrial fibrillation with slow ventricular response  Possible alteral infarct, age undetermined  Possible inferior infarct, age undetermined  Abnormal ECG   Rate 38 bpm. ID interval * ms. QRS duration 90 ms. QT/QTc 562/446 ms. P-R-T axes * 4 11.          Laboratory:    CBC: WBC 4.9, HGB 9.3 (L),   CMP:  (L), potassium 6.2 (HH),  (H), BUN 83 (H), creatinine 6.83 (H), GFR 7 (L), albumin 3.3 (L) o/w WNL   Lipase: 1294 (H)  CK total: 45  Alcohol ethyl: 0.08 (H)   ABO RH Type and Screen: O+, antibody neg   INR: pending    Asymptomatic COVID19 Virus PCR by nasopharyngeal swab pending          Emergency Department Course:    Reviewed:    1207 I reviewed the patient's nursing notes, vitals, past medical records, Care Everywhere.        Assessments:  1212 I performed an exam of the patient as documented above.   1306 Patient rechecked and updated.    1330  I rechecked the patient      Consults:   1318 I spoke with Dr. Dong of the hospitalist service regarding patient's presentation, findings, and plan of care.     Interventions:  1311 Calcium gluconate 1 g IV  1323 Dextrose 50% 25 g IV   1325 NS 1 L IV   1334 Insulin regular 9.1 units IV  1343 Sodium bicarbonate 8.4% 50 mEq  IV  1346 Dextrose 10% infusion IV       Disposition:  The patient was admitted to the hospital under the care of Dr. Dong.       Impression & Plan       Critical care time: 30 minutes separate of procedures       Medical Decision Making:  Antonio Ramirez is a 76 year old male who presents to the emergency department today for evaluation of generalized weakness and rectal bleeding.  History of taking Eliquis for antiphospholipid antibody syndrome and pulmonary embolism.  Patient had stopped it for a week following a recent admission for rectal bleeding found to be related to colon this  "malformation.  Patient states he restarted it several days ago.  Now with 2 to 3 days of 2-3 episodes of rectal bleeding each day.  He denies abdominal pain.  He denies nausea, vomiting, diarrhea.  He states he drinks 3 alcoholic drinks per day and one soda no water.  On arrival here to move from wheelchair to stretcher was extremely weak and required the assist of 2-3 people.  Very tremulous as well.  Denies falls or hitting his head.  He does have a scab noted to his forehead that he states is from \"I am a \".  Patient denies any fever, chest pain, cough.  He was found to be mildly bradycardic here.  EKG without sign wave changes or wide QRS.  No elevated T waves.  He was found to be extremely dehydrated and had a creatinine of 6.8 as well as a potassium of 6.2.  He was treated for hyperkalemia with sodium bicarb, insulin, D50, calcium gluconate.  IV fluids were started for what is likely prerenal failure.  Continue to monitor his blood sugar given the insulin that was provided as the patient is not known to be diabetic.  Bradycardia did not change with treatment of the potassium.  We will continue to monitor.  Given patient's critical illness he will need admission to the [facility] for close monitoring.  Discussed with the hospitalist who kindly agrees accept the patient for admission to the [facility].    Covid-19  Antonio Ramirez was evaluated during a global COVID-19 pandemic, which necessitated consideration that the patient might be at risk for infection with the SARS-CoV-2 virus that causes COVID-19.   Applicable protocols for evaluation were followed during the patient's care.   COVID-19 was considered as part of the patient's evaluation. The plan for testing is:  a test was obtained during this visit.       Diagnosis:    ICD-10-CM    1. Rectal bleeding  K62.5 ABO/Rh type and screen     CK total     CK total     Alcohol ethyl     Alcohol ethyl     CANCELED: Potassium     CANCELED: CK total     CANCELED: Alcohol " level blood   2. Acute renal failure, unspecified acute renal failure type (H)  N17.9    3. Hyperkalemia  E87.5    4. Bradycardia  R00.1          Scribe Disclosure:  I, Devin Paige, am serving as a scribe at 12:10 PM on 4/1/2021 to document services personally performed by Giselle Conrad MD based on my observations and the provider's statements to me.          Giselle Conrad MD  04/01/21 [id number]

## 2021-04-01 NOTE — H&P
Admitted:     04/01/2021      HISTORY OF PRESENT ILLNESS:  This is a 76-year-old male with history of hypertension, hyperlipidemia, GERD, alcoholism, history of PE in the past, anticardiolipin antibody syndrome on Eliquis, history of alcoholic liver disease, coronary artery disease, paroxysmal atrial fibrillation, ischemic cardiomyopathy with EF of 40-45%, who was recently admitted in the hospital from 03/16 to 30/19 because of acute GI bleeding secondary to angiodysplastic lesion in the colon and being on Eliquis.  He now comes back to the ER with complaint of generalized weakness, fatigue, tiredness and bleeding per rectum for the last 3-4 days.      According to the patient, since he has been discharged from the hospital, he has been feeling weak and fatigued and is mostly wheelchair bound.  He held his Eliquis for 1 week and start taking it again on last Saturday, 03/27, and he noticed that he started having bright red blood per rectum on the second day, that for the last 3-4 days now.  He is having about a half a cup of bright red blood with each bowel movement 1-2 times per day, and he also noticed that for about 1 week, he has no urine output, is not making much urine.  He is feeling weak, tired and nauseated with off and on vomiting as well.  The patient denies any fall, chest pain, shortness of breath, orthopnea, PND, palpitation.  No dysuria, hematuria, constipation or diarrhea.      The patient also continues to drink vodka, 3 drinks daily.      In the ER, he was evaluated.  He was hypotensive, bradycardic.  His creatinine increased from 1.2 and when he was discharged to 6.83.  Potassium was elevated to 6.2.  Hemoglobin was stable, so the Hospitalist Service was consulted to admit the patient.      ASSESSMENT AND PLAN:   1.  Acute lower gastrointestinal bleeding:  This is a 76-year-old male with history of GI bleeding in the recent past who was admitted earlier in 03/2021.  He was on Eliquis.  He has an  EGD on 03/16, which shows esophageal stenosis benign, esophagitis, portal gastropathy and normal duodenum.  Colonoscopy shows multiple angiodysplastic lesions with bleeding in cecum.  Multiple diverticula in sigmoid region and rectal varices.  Was not bleeding.  He was treated with cautery at that time.  He was asked to hold anticoagulation for about a week.  He did, and now he is back on anticoagulation and bleeding again.  Although his hemoglobin is stable, he slightly hypertensive at this time and bradycardic.  We will admit him and start him on IV fluids and IV Protonix 40 b.i.d.  We will start him on low-dose octreotide as well at 25 mcg per hour at this time.  Consult GI to evaluate the patient.  Continue to hold his Eliquis.  Continue Protonix 40 b.i.d.  GI will be consulted.  Most likely will need colonoscopy again to achieve hemostasis.  Most likely bleeding again from his angiodysplastic lesion.  Cannot rule out rectal varices bleeding, so we will keep him on octreotide for now as well.   2.  Acute on chronic renal failure, stage III:  The patient had chronic kidney disease, stage III, with baseline creatinine in the 1.3 to 1.6 range.  It has now increased to 6.83.  He is not having significant vomiting or diarrhea.  He only uses occasionally ibuprofen.  He is slightly hypotensive, though.  The cause of this significant rise in creatinine is uncertain at this time.  This may be hepatorenal syndrome versus ATN as not eating and drinking well.  I will go ahead and check FENA and ultrasound of the kidneys to rule out any hydronephrosis given history of prostate cancer in the past, check CPK level.  The bladder is empty on my examination, although abdomen is distended with some ascites.  We will start him on IV fluids, bicarbonate infusion drip at 100 mL per hour.  I will ask Nephrology to see him.  We will check FENA an ultrasound of kidneys to rule out any hydronephrosis.  Further workup as per Nephrology if  needed.   3.  Hyperkalemia:  This is again secondary to acute renal failure, not making urine, almost anuric at this time.  Keep him on IV fluids.  He received active treatment with IV calcium gluconate, IV insulin and dextrose.  We will put him on IV bicarbonate infusion drip at this time.   4.  Atrial fibrillation with Slow Ventricular response.  Bradycardia.  The patient is on nadolol.  His heart rate was low at 38, now improved to 40s.  Blood pressure is in upper 90s to low 100s.  Will keep him on IV fluids.  Hold nadolol.  Calcium gluconate is given.  EKG does not show any changes of hyperkalemia at this time.   5.  Alcohol abuse and alcohol liver disease and possible acute pancreatitis:  His LFTs are normal, but he has continued to drink alcohol. His Lipase slightly elevated,   He seems like he has some ascites as well.  I counseled him about stopping alcohol.  We will need to keep him on alcohol withdrawal protocol.  We will check CIWA and give him lorazepam according to CIWA if needed. Keep NPO for now and continue IV fluids at this time.  6.  History of ischemic cardiomyopathy:  Does not look like florid exacerbation.  Will have to hold his nadolol.  He is not on too many medications for ischemic cardiomyopathy as he was to start with.   7.  Paroxysmal atrial fibrillation/anticardiolipin antibody syndrome, history of PE in the past:  As per record, he may have anticardiolipin antibody, but he is on Eliquis 2.5 mg daily only.  It is causing more complications then benefits at this point.  We need to readdress about his anticoagulation as he is unable to tolerate even the lowest dose of 2.5 mg daily.  If he needs to be treated for anticardiolipin antibody syndrome, he may need to be on Coumadin, but I think he has been seen in the past by Oncology/Hematology.  May need to consider consulting them again if needed during this hospitalization.   8.  History of esophagitis:  On Protonix as mentioned above.   9.   Hyponatremia:  Again, I think it is most likely because of renal failure.  May improve with IV fluids for now.   10.  Coronary artery disease:  Status post bypass surgery, stable at this time.   11.  History of diffuse large B-cell lymphoma:  Needs outpatient monitoring at this time.      CODE STATUS:  Full code.      I had a detailed discussion with the patient regarding his code status, and he wanted to be full code.      The case discussed with ER physician and the nursing staff taking care of the patient.      Total time spent today was more than 50 minutes of critical care time, which included chart review, history taking, physical examination, formation of the plan, counseling and coordination of care.         KB HUGHES MD               D: 2021   T: 2021   MT:       Name:     GALILEA LUGO   MRN:      [id number]        Account:      [id number]   :      1945        Admitted:     2021                   Document: [id number]       cc: Main Hardy MD

## 2021-04-01 NOTE — PLAN OF CARE
Patient arrived from ED at 4: 44 pm, he is alert and orientated X 4. Vital signs stable on room air except bradycardia. Atrial fibrillation with Slow Ventricular response. Incontinent BM X 1 black. Excoriated area to anus, barrier cream applied. Mepilex on coccyx for prevention. Edema to lower extremities, prosthetic leg to left lower extremity. Unable to obtain IV access for octreotide infusion, resource nurse attempted no success. Awaiting Flyer to attempt. Continue to monitor.

## 2021-04-02 NOTE — CONSULTS
Consult Date:  04/02/2021      REQUESTING PHYSICIAN:  Dr. Garcia.      REASON FOR CONSULTATION:  Bradycardia.      HISTORY OF PRESENT ILLNESS:  Mr. Ramirez is a 76-year-old white male who presented to the ER for evaluation of rectal bleeding.  He was found to have hemoglobin 9.1 and he was on Eliquis.  The labs showed that he had severe hyperkalemia and severe kidney dysfunction.  He received insulin for management of hyperkalemia and subsequently developed severe hypoglycemia.  He was on nadolol 20 mg p.o. daily for paroxysmal atrial fibrillation prior to this admission.  The patient developed severe sinus bradycardia this morning down to 29 beats per minute when he was awake.  He received atropine.  The current heart rate has improved to the upper 30s and low 40s while he was asleep.  He did not have syncope or near-syncope.  The current blood pressure is relatively stable.      PAST MEDICAL HISTORY:  Very long list.  Cardiac history includes coronary artery disease with previous CABG.  The ejection fraction is about 40%.  There is paroxysmal atrial fibrillation.  Echocardiography reported dilated aortic root with moderate aortic stenosis and mild mitral stenosis.  History of hypertension.  Other medical conditions include urethral stricture, chronic renal insufficiency, pulmonary hypertension, prostate cancer, DVT/PE, ileitis, neuropathy, monoclonal gammopathy, large grade B-cell lymphoproliferative disorder, liver disease, left foot drop, hiatal hernia, GI reflux, antiphospholipid antibody syndrome, anticardiolipin antibody syndrome, amputated left leg, alcoholism, peripheral vascular disease, phlebitis of the lower extremities.      FAMILY HISTORY:  Noncontributory to bradycardia.      SOCIAL HISTORY:  He is full code.      REVIEW OF SYSTEMS:  Comprehensive review of the systems showed no fever, cough or diarrhea.      CURRENT MEDICATIONS:   1.  Folic acid 1 mg p.o. daily.   2.  Protonix 40 mg IV b.i.d.   3.   Vitamin B1 200 mg p.o. t.i.d.   4.  IV dextrose 10% infusion.      PHYSICAL EXAMINATION   GENERAL:  He is drowsy.   VITAL SIGNS:  Blood pressure is 125/72, heart rate 36 beats per minute to 42 beats per minute, temperature 97.5 degrees, oxygen saturation 91% on room air.   HEENT:  The eyes and ENT were unremarkable.   LUNGS:  No crackles or wheezing.   SKIN:  There was no skin rash or lymph node enlargement.   NECK:  The jugular vein was not elevated.   LUNGS:  Clear.   CARDIAC:  Rhythm was regular, and heart sounds were normal with no murmur.   ABDOMEN:  Showed no hepatomegaly.   EXTREMITIES:  There was no pedal edema.   NEUROLOGIC:  Showed no focal deficit.      ASSESSMENT AND RECOMMENDATIONS:  Mr. Ramirez is a 76-year-old white male with history of paroxysmal atrial fibrillation.  He is admitted for rectal bleeding and has been found to have severe kidney dysfunction and hyperkalemia.  He developed severe sinus bradycardia which is likely secondary to the recent use of nadolol and hyperkalemia.  At this point, I would monitor him closely.  If he develops bradycardia again with low blood pressure, I would consider temporary pacer insertion.  If the heart rate continues to be slow by next week, I would consider permanent pacemaker.      The patient may have relative contraindication for Eliquis anticoagulation.         UMER GARCIA MD             D: 2021   T: 2021   MT: ROSA      Name:     GALILEA RAMIREZ   MRN:      [id number]        Account:       [id number]   :      1945           Consult Date:  2021      Document: [id number]

## 2021-04-02 NOTE — PRE-PROCEDURE
GENERAL PRE-PROCEDURE:   Procedure:  Temporary pacemaker   Date/Time:  4/2/2021 2:12 PM    Verbal consent obtained?: Yes    Written consent obtained?: Yes    Risks and benefits: Risks, benefits and alternatives were discussed    Consent given by:  Patient  Patient states understanding of procedure being performed: Yes    Patient's understanding of procedure matches consent: Yes    Procedure consent matches procedure scheduled: Yes    Expected level of sedation:  Moderate  Appropriately NPO:  Yes  ASA Class:  Class 3- Severe systemic disease, definite functional limitations  Mallampati  :  Grade 2- soft palate, base of uvula, tonsillar pillars, and portion of posterior pharyngeal wall visible  Lungs:  Lungs clear with good breath sounds bilaterally  Heart:  A-fib and systolic murmur  Statement of review:  I have reviewed the lab findings, diagnostic data, medications, and the plan for sedation  Urgent temporary pacemaker requested for profound bradycardia in setting of renal failure, chronic atrial fibrillation and severe aortic stenosis. Consent has been obtained by  DP service.

## 2021-04-02 NOTE — PLAN OF CARE
A&O x4. VS bradycardic on RA. Tele afib. Lungs diminished w/ wheezes. BS+, BM+, flatus+. NPO w/ ice chips. Denies N/V. BGs 28, 29, 77, 80 and 130 - fluids changed. Levin patent. Denies pain. Turn and repo - often refused. Mepilex to coccyx. Bruising noted throughout. Pt to cath lab for temp pacemaker, report given to CCU.

## 2021-04-02 NOTE — PROGRESS NOTES
[facility]    Hospitalist Progress Note    Date of Service (when I saw the patient): 04/02/2021    Assessment & Plan   Antonio Ramirez is a 76 year old male who was admitted on 4/1/2021.  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  He now comes back to the ER with complaint of generalized weakness, fatigue, tiredness and bleeding per rectum for the last 3-4 days.      ASSESSMENT AND PLAN:     Acute lower gastrointestinal bleeding:  This is a 76-year-old male with history of GI bleeding in the recent past who was admitted earlier in 03/2021.  He was on Eliquis.  He has an EGD on 03/16, which shows esophageal stenosis benign, esophagitis, portal gastropathy and normal duodenum.  Colonoscopy shows multiple angiodysplastic lesions with bleeding in cecum.  Multiple diverticula in sigmoid region and rectal varices.  Was not bleeding.  He was treated with cautery at that time.  He was asked to hold anticoagulation for about a week.  He did, and now he is back on anticoagulation and bleeding again.  Although his hemoglobin is stable, he slightly hypertensive at this time and bradycardic.    Started on IV fluids and IV Protonix twice daily and octreotide drip on admission   GI consultation with  requested  Hemoglobin stayed stable at 9.3 on admission to 9.4 today    Severe hypoglycemia;  Most likely secondary to the insulin given to treat for the hyperkalemia in the setting of acute kidney injury contributing  Blood sugar was low at 28-29 today  Started  on D10 infusion which was switched to D5W with IV fluids as per nephrology  Monitor blood sugars closely    Atrial fibrillation with slow ventricular response with bradycardia with heart rate in the 20s;  Patient was on low-dose nadolol 20 mg p.o. daily which has been on hold since admission  Heart rate running really low in the 20s today  EP consultation requested planning on temporary pacemaker placement today      Acute acute kidney injury on CKD stage III:   Hyperkalemia   The patient had chronic kidney disease, stage III, with baseline creatinine in the 1.3 to 1.6 range.  It has now increased to 6.83.  He is not having significant vomiting or diarrhea.  He only uses occasionally ibuprofen.  He is slightly hypotensive, though.  The cause of this significant rise in creatinine is uncertain at this time.  This may be hepatorenal syndrome versus ATN as not eating and drinking well.  I will go ahead and check FENA and ultrasound of the kidneys to rule out any hydronephrosis given history of prostate cancer in the past,   Nephrology consulted  Getting IV fluids per nephrology  Renal ultrasound on admission showed no hydronephrosis mildly atrophic left kidney small ascites seen  Creatinine still is elevated at 6.29     Hyperkalemia:    This is again secondary to acute renal failure, not making urine, almost anuric at this time.  Keep him on IV fluids.  He received active treatment with IV calcium gluconate, IV insulin and dextrose.  We will put him on IV bicarbonate infusion drip at this time.   Given 30 g of Kayexalate p.o. 1 dose today  Follow potassium closely      Alcohol abuse and alcohol liver disease and possible acute pancreatitis:  His LFTs are normal, but he has continued to drink alcohol. His Lipase slightly elevated,   He seems like he has some ascites as well.  I counseled him about stopping alcohol.  We will need to keep him on alcohol withdrawal protocol.  We will check CIWA and give him lorazepam  according to CIWA if needed. Keep NPO for now and continue IV fluids at this time      History of ischemic cardiomyopathy:   Echo done today showed EF of 45 to 50%.  2+ MR 2+ TR and severe valvular aortic stenosis with valve area of 0.8 cm    Does not look like florid exacerbation.  Will have to hold his nadolol due to bradycardia.    Monitor fluid status closely as he is at risk of fluid overload      Paroxysmal atrial fibrillation/anticardiolipin antibody syndrome, history of PE in the past:  As per record, he may have anticardiolipin antibody, but he is on Eliquis 2.5 mg daily only.  It is causing more complications then benefits at this point.  We need to readdress about his anticoagulation as he is unable to tolerate even the lowest dose of 2.5 mg daily.  If he needs to be treated for anticardiolipin antibody syndrome, he may need to be on Coumadin, but I think he has been seen in the past by Oncology/Hematology.    Requested [facility] consultation as patient has history of anticardiolipin syndrome with history of PE and recurrent GI bleeding      History of esophagitis:  On Protonix as mentioned above.         Hyponatremia:  Again, I think it is most likely because of renal failure.  May improve with IV fluids for now.   Sodium is stable at 1 27-1 27 today  10.  Coronary artery disease:  Status post bypass surgery, stable at this time.   11.  History of diffuse large B-cell lymphoma:  Needs outpatient monitoring at this time.      CODE STATUS:  Full code.          DVT Prophylaxis: Pneumatic Compression Devices in the setting of GI bleed  Code Status: Full Code    Disposition: Expected discharge in 3 to 4 days once creatinine improves and heart rate is stable    Discussed with bedside RN, patient and his wife over the phone today and with electrophysiology with Dr. Hewitt today    Greater than 35 minutes were spent in taking care of him today including reviewing the chart, collaboration of care and  counseling the patient and goals of care discussions with wife    Goals of care discussion was  done with the wife Helena she would like to continue current cares including full code for now.  I discussed with her about palliative care consultation regarding goals of care discussions and she would like to hold off on palliative care consultations for now as she met with palliative care with his hospitalizations in the past  Goals remain restorative at this point    Cecelia Garcia MD  [phone] (P)      Interval History   Patient is resting comfortably in bed.  Heart rate is running low in the 20s today.  Mild intermittent dizziness.  Denies any shortness of breath or chest pain currently.  No fevers or chills.  Potassium is elevated at 6 today 30 g of Kayexalate given today    -Data reviewed today: I reviewed all new labs and imaging results over the last 24 hours. I personally reviewed echo imaging showing results as noted above.  Telemetry showing atrial fibrillation with slow ventricular response with heart rate in the 20s    Physical Exam   Temp: 97.7  F (36.5  C) Temp src: Oral BP: 108/59 Pulse: (!) 28   Resp: 9 SpO2: 98 % O2 Device: None (Room air)    Vitals:    04/01/21 1156 04/02/21 0600   Weight: 90.7 kg (200 lb) 102.4 kg (225 lb 12 oz)     Vital Signs with Ranges  Temp:  [97.4  F (36.3  C)-97.7  F (36.5  C)] 97.7  F (36.5  C)  Pulse:  [28-46] 28  Resp:  [9-47] 9  BP: ()/() 108/59  SpO2:  [86 %-99 %] 98 %  I/O last 3 completed shifts:  In: 435.42 [I.V.:435.42]  Out: 200 [Urine:200]    Constitutional: Awake, alert, cooperative, no apparent distress, pallor present.  appears very weak and tired  Respiratory: Clear to auscultation bilaterally, no crackles or wheezing  Cardiovascular: Regular rate and rhythm, normal S1 and S2, and 2+ murmur noted  GI: Normal bowel sounds, soft, non-distended, non-tender  Skin/Integumen: No rashes, no cyanosis, no edema  Other:     Medications     IV infusion  builder WITH additives 75 mL/hr at 04/02/21 1145     dextrose 10% 25 mL/hr at 04/02/21 1113     octreotide (sandoSTATIN) infusion ADULT 25 mcg/hr (04/02/21 0044)       albumin human  25 g Intravenous Q6H     folic acid  1 mg Oral Daily     glucagon         multivitamin w/minerals  1 tablet Oral Daily     pantoprazole (PROTONIX) IV  40 mg Intravenous BID     sodium chloride (PF)  3 mL Intracatheter Q8H     sodium chloride (PF)  3 mL Intracatheter Q8H     thiamine  200 mg Oral TID    Followed by     [START ON 4/3/2021] thiamine  100 mg Oral TID    Followed by     [START ON 4/9/2021] thiamine  100 mg Oral Daily       Data   Recent Labs   Lab 04/02/21  0648 04/02/21  0002 04/01/21  1817 04/01/21  1610 04/01/21  1206   WBC 5.4  --   --   --  4.9   HGB 9.4* 9.1* 8.8* 9.2* 9.3*   MCV 98  --   --   --  101*     --   --   --  158   INR  --   --   --   --  1.56*   *  --  129* 128* 127*   POTASSIUM 6.0*  --  5.8* 5.6* 6.2*   CHLORIDE 97  --  98  --  97   CO2 21  --  22  --  20   BUN 83*  --  78*  --  83*   CR 6.29*  --  6.65*  --  6.83*   ANIONGAP 9  --  9  --  10   BENNIE 8.5  --  8.7  --  8.8   GLC 29*  --  59*  --  110*   ALBUMIN 3.2*  --   --   --  3.3*   PROTTOTAL 6.9  --   --   --  7.3   BILITOTAL 1.0  --   --   --  0.6   ALKPHOS 89  --   --   --  93   ALT 18  --   --   --  20   AST 32  --   --   --  32   LIPASE  --   --   --   --  1,294*       Recent Results (from the past 24 hour(s))   US Renal Complete    Narrative    US RENAL COMPLETE 4/1/2021 9:00 PM    CLINICAL HISTORY: ARF  TECHNIQUE: Routine Bilateral Renal and Bladder Ultrasound.    COMPARISON: CT 3/10/2021    FINDINGS:    RIGHT KIDNEY: 11.4 x 4.9 x 6.1 cm. Normal without hydronephrosis or  masses.     LEFT KIDNEY: 9.1 x 4.6 x 5.7 cm. Mildly atrophic. Evaluation is  limited by body habitus. The renal calculi seen on the prior CT could  not be visualized on ultrasound. No hydronephrosis.    BLADDER: Normal.    Small ascites.      Impression     IMPRESSION:  1.  No hydronephrosis in either kidney.  2.  The known left renal calculi were not visualized by ultrasound.  Mildly atrophic left kidney.  3.  Small ascites.    VENKATA JOHNSON MD   Echocardiogram Complete    Narrative    [id number]  [id number]  [id number]  [id number]^SAUL^KB^LANE     [facility]  Echocardiography Laboratory  [address] [place] [address]     Name: GALILEA LUGO  MRN: [id number]  : 1945  Study Date: 2021 09:04 AM  Age: 76 yrs  Gender: Male  Patient Location: [facility]  Reason For Study: Bradycardia - Sinus  Ordering Physician: KB HUGHES  Referring Physician: CHRIST SUAZO  Performed By: Teressa Griffiths     BSA: 2.3 m2  Height: 73 in  Weight: 225 lb  HR: 31  BP: 134/105 mmHg  ______________________________________________________________________________  Procedure  Complete Portable Echo Adult.  ______________________________________________________________________________  Interpretation Summary     There is mild concentric left ventricular hypertrophy.  The visual ejection fraction is estimated at 45-50%.  Flattened septum is consistent with RV pressure/volume overload.  There is severe apical wall hypokinesis.  The right ventricle is mild to moderately dilated.  The right ventricular systolic function is mild to moderately reduced.  There is mod-severe biatrial enlargement.  There is moderate (2+) mitral regurgitation.  There is moderate (2+) tricuspid regurgitation.  Dilation of the inferior vena cava is present with abnormal respiratory  variation in diameter.  Right ventricular systolic pressure is elevated, consistent with severe  pulmonary hypertension.  Severe valvular aortic stenosis, DIVINA 0.8 cm2. Significantly worse compared  with study from 2019.  ______________________________________________________________________________  Left Ventricle  The left ventricle is borderline dilated. There is mild concentric left  ventricular  hypertrophy. Left ventricular diastolic function is abnormal. The  visual ejection fraction is estimated at 45-50%. Flattened septum is  consistent with RV pressure/volume overload. There is severe apical wall  hypokinesis.     Right Ventricle  The right ventricle is mild to moderately dilated. The right ventricular  systolic function is mild to moderately reduced.     Atria  There is mod-severe biatrial enlargement. The right atrium is severely  dilated.     Mitral Valve  There is moderate to severe mitral annular calcification. The mitral valve  leaflets appear thickened, but open well. There is moderate (2+) mitral  regurgitation.     Tricuspid Valve  Tricuspid leaflets are thickened. Right ventricular systolic pressure is  elevated, consistent with severe pulmonary hypertension. There is moderate  (2+) tricuspid regurgitation.     Aortic Valve  Severe valvular aortic stenosis.     Pulmonic Valve  Normal pulmonic valve. There is trace pulmonic valvular regurgitation.     Vessels  Mild aortic root dilatation. The ascending aorta is Mildly dilated. Dilation  of the inferior vena cava is present with abnormal respiratory variation in  diameter.     Pericardium  There is no pericardial effusion.     Rhythm  The rhythm was undetermined.  ______________________________________________________________________________  MMode/2D Measurements & Calculations  IVSd: 1.2 cm     LVIDd: 5.4 cm  LVIDs: 3.3 cm  LVPWd: 1.1 cm  FS: 39.5 %  LV mass(C)d: 252.2 grams  LV mass(C)dI: 111.5 grams/m2  Ao root diam: 4.1 cm  LA dimension: 5.2 cm  asc Aorta Diam: 4.3 cm  LA/Ao: 1.3  LVOT diam: 2.4 cm  LVOT area: 4.7 cm2  LA Volume (BP): 168.0 ml  LA Volume Index (BP): 74.3 ml/m2  RWT: 0.42     Doppler Measurements & Calculations  MV E max kayden: 135.0 cm/sec  MV A max kayden: 55.7 cm/sec  MV E/A: 2.4  MV max P.3 mmHg  MV mean P.6 mmHg  MV V2 VTI: 60.7 cm  MVA(VTI): 1.7 cm2  MV P1/2t max kayden: 170.1 cm/sec  MV P1/2t: 54.0 msec  MVA(P1/2t):  4.1 cm2  MV dec slope: 922.6 cm/sec2  MV dec time: 0.21 sec  Ao V2 max: 374.6 cm/sec  Ao max P.1 mmHg  Ao V2 mean: 245.5 cm/sec  Ao mean P.0 mmHg  Ao V2 VTI: 116.4 cm  DIVINA(I,D): 0.89 cm2  DIVINA(V,D): 0.88 cm2  LV V1 max P.0 mmHg  LV V1 max: 70.4 cm/sec  LV V1 VTI: 22.2 cm  SV(LVOT): 104.0 ml  SI(LVOT): 46.0 ml/m2  PA V2 max: 101.8 cm/sec  PA max P.1 mmHg  PA acc time: 0.09 sec  TR max canelo: 401.8 cm/sec  TR max P.6 mmHg  AV Canelo Ratio (DI): 0.19  DIVINA Index (cm2/m2): 0.39  E/E' av.5  Lateral E/e': 25.8  Medial E/e': 35.3     ______________________________________________________________________________  Report approved by: Telma De Luna 2021 11:30 AM

## 2021-04-02 NOTE — PLAN OF CARE
VSS. No complaints. HR 60, 100% Vpaced.  TPM site R internal jugular. Rectal bleeding continues, slow. Turn/clean every 2 hours. Octreotide infusing. HGB stable-checks q 6 hours. K recheck 6.1,  kayexelate given for 2nd time today, recheck tonight.  Blood sugars now stable at 150, continue to monitor. Clear liquid diet. WOC to see for wound between 4/5th toes-cleaned. Ciwa scores stable. Levin patent, urine output remains very low. Continue close monitoring/[facility] cares.

## 2021-04-02 NOTE — PROGRESS NOTES
Procedure  Preliminary note    Temporary pacemaker  Approach RIJ  Complications none noted  Indication profound bradycardia in setting of severe Aortic Stenosis, chronic atrial fibrillation on apixaban, rectal bleed and renal failure. Urgent temporary ppm requested.     We used ultrasound guidance and accessed RIJ with a micropuncture needle and a single anterior wall stick. We exchanged first for a 4, then a 6 Fr sheath using Seldinger technique under fluoroscopy. We advanced a temporary balloon-directed PPM to the RV, positioning the tip against the septum. We obtained stable pacing thresholds at 0.3 MA. The pacemaker was set at  Rate 60  5 MA. A sterile sleeve was placed over the distal end of the catheter and the sheath and pacemaker were secured against the skin. No complications noted.

## 2021-04-02 NOTE — PROGRESS NOTES
Heart rate dropped to the 20s again after washout of atropine.  Ordered temporary pacing wire insertion.  Risk and benefit explained.

## 2021-04-02 NOTE — PROVIDER NOTIFICATION
MD Notification    Notified Person: MD    Notified Person Name: Dr. Garcia    Notification Date/Time: 4/2/21 0304    Notification Interaction: Text paged    Purpose of Notification: Blood sugar 28 asymptomatic, juice given. No orders for dextrose or glucagon    Orders Received: D10 @ 75 ML/hr - 1x Dextrose 50% injection    Comments:

## 2021-04-02 NOTE — PLAN OF CARE
A+Ox4 VSS ex bradycardic in 30s, MD aware. Tele A-fib w/ slow ventricular response. LS diminished. Bowels active, flatus+, no BM. Voiding adequately. Edematous in RLE. Refuses repositioning. Denies pain. Up w/ 2. Octreotide drip running at 25 mcg/hr. NPO.

## 2021-04-02 NOTE — PROGRESS NOTES
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  He again presented to the ED yesterday with complaint of generalized weakness, fatigue, tiredness and bleeding per rectum for the last 3-4 days.      According to the patient, since he has been discharged from the hospital, he has been feeling weak and fatigued and is mostly wheelchair bound.  He held his Eliquis for 1 week after discharge and start taking it again Saturday, 03/27. He noticed that he started having bright red blood per rectum on the second day, that has continued for the last 3-4 days now. He does not really want to undergo endoscopy again at this time.     The patient also continues to drink vodka, 3 drinks daily.      In the ED, he was noted to be hypotensive, bradycardic.  His creatinine increased from 1.2 when he was discharged to 6.83 yesterday.  Potassium was elevated to 6.2.  Hemoglobin was stable at 9.3 and was 9.5 at discharge on 3/19/21.     Rectal bleeding with recent history of bleeding angiodysplastic colon lesions  - Recommend monitoring his stools and serial hemoglobins at this time.   - Continue to hold Eliquis  - Agree with cardiology and nephrology consults to stabilize patient  - Consider repeat endoscopy if needed when patient is stable. He is declining at this time anyway.     INTERIM VISIT: (admission day 1 ): patient denies abdominal pain or bowel movement this AM. Per nursing report, he had one incontinent black stool overnight. His hemoglobin is stable at 9.5 this AM. He has no new  concerns. Cardiology and nephrology have been consulted. His pulse has been running low in the 30s today and he is NPO as may need a pacemaker placed.     PHYSICAL EXAM:  Vital Signs: Temp: 97.7  F (36.5  C) Temp src: Oral BP: 108/59 Pulse: (!) 30   Resp: 13 SpO2: 98 % O2 Device: None (Room air)    Weight: 225 lbs 12.02 oz      Recent Labs   Lab Test 04/02/21  0648 04/01/21  1817   POTASSIUM 6.0* 5.8*   CHLORIDE 97 98   BUN 83* 78*       Recent Labs   Lab Test 04/02/21  0648 04/02/21  0002 04/01/21  1206 04/01/21  1206 12/07/20  1308 12/07/20  1308   INR  --   --   --  1.56*  --  1.54*   WBC 5.4  --   --  4.9   < >  --    HGB 9.4* 9.1*   < > 9.3*   < >  --      --   --  158   < > 101*    < > = values in this interval not displayed.     Active Problems:    Rectal bleeding    Hyperkalemia    Bradycardia    Acute renal failure, unspecified acute renal failure type (H)    ADDITIONAL COMMENTS:    I reviewed all medications, new labs and imaging results over the last 24 hours.   I discussed the patient's progress with Dr. Serna.     STEFANIE Salgado, [facility]   [phone] Office  [phone] Cell

## 2021-04-02 NOTE — CONSULTS
[facility]:    Patient sees [facility], Dr. Raymond.  Please re-direct consult to [facility].

## 2021-04-02 NOTE — CONSULTS
Consult Date:  04/02/2021      IDENTIFICATION:  A 76-year-old male who presented to the emergency room, dated 04/01/2001 in the setting of rectal bleeding.      REASON FOR CONSULTATION:  Evaluation and assessment with respect acute kidney injury and metabolic abnormalities.      IMPRESSION:   1.  Baseline chronic kidney disease on the basis of data review.  Baseline creatinine appears to be typically in the range of 1.5 mg/dL.  Estimated GFR in the range of 60 mL per minute, representing stage IIIA CKD.   2.  Previous history of acute kidney injury on multiple occasions.  Many episodes not returning to previous established baseline renal function.  He does demonstrate abnormal creatinine state date back to at least 2014.   3.  Intermittent dipstick positive proteinuria.   4.  Alcoholic cirrhosis with documented portal hypertension and previous requirement for ascites removal via paracentesis.   5.  Acute kidney injury with presenting creatinine 6.83 mg/dL.  This presumably represents a combination of an initial prerenal state evolving to acute tubular necrosis.  We do not have urine studies for review.  He has demonstrated some improvement in renal function.  His creatinine today being 6.29.   6.  Modest metabolic acidosis.   7.  Modest hyperkalemia.   8.  Hyponatremia.   9.  History of atrial fibrillation.   10.  Bradycardia.   11.  Pancreatitis.   12.  History of diffuse large B-cell lymphoma.      DISCUSSION:  Elderly male with a number of comorbid medical problems who presents with acute on chronic kidney injury in the setting of prolonged rectal bleeding.  He has responded modestly to IV infusion.  There is likely a prerenal component is likely demonstrated progressed to acute tubular necrosis.  Given his modest improvement we will continue to try to optimize his volume status prior to proceeding to requirement for renal replacement therapy.  His urine output has been limited.      RECOMMENDATIONS:   1.  No  further nephrotoxic agents should be prescribed.   2.  No indication for diuretics in this patient.   3.  No NSAIDs or IV contrast should be given.   4.  Adjust IV fluid to contain bicarbonate to correct metabolic acidosis, which will in turn lower serum potassium.   5.  Attempt to avoid hypotonic fluid as able, which will exacerbate his modest hyponatremia.   6.  Given his history of liver disease, he may well have hepatorenal syndrome.  Urine sodium should be documented.   7.  Provide 25% albumin infusion to gauge urine output and renal response.   8.  Continue to follow with you.   9.  At this point, I would not proceed to dialysis.      HISTORY:  A 76-year-old male with a number of comorbid medical problems that have previously been delineated in the medical record.  He was admitted to this facility in mid March because of acute GI bleed secondary to angiodysplasia in his colon.  He now returns with several-day history of recurrent bleeding.      His renal history is extensive.  It dates back to 2014 with elevated creatinines.  He has had intermittent episodic acute renal failure since that time with creatinines previously as high as 2-3.      He mentions that he may have been on dialysis secondary to rhabdomyolysis.  I do not see this in the medical record.      He now presents creatinine 6.83.  Initial presenting potassium 6.2 with a bicarbonate of 20.  His labs have modestly improved.  His bicarbonate is up to 21.  His K is down to 2.  He has been treated pharmacologically and is about to receive Kayexalate.      He is awake, alert, appropriate, interactive, answering questions.      He had a renal ultrasound which demonstrates bilateral kidneys perhaps slight atrophy on the left, but no evidence of hydro.  His admission medications were reviewed and do not include diuretics or Ace inhibitors.  He has not been using NSAIDs at home by his report.      There is some suggestion that he may have pancreatitis as  well.      ALLERGIES:  STATINS, CIPROFLOXACIN, MINOCYCLINE.      MEDICATIONS:  Eliquis.      ADMISSION MEDICATIONS:  Aspirin, Corgard, and Protonix.      PAST MEDICAL HISTORY:  Extensive and reviewed in the medical record.      SOCIAL HISTORY:  Alcohol by review.      FAMILY HISTORY:  Lung cancer and heart failure.      PHYSICAL EXAMINATION:   VITAL SIGNS:  Afebrile, rhythm is sinus yelena.  His rate is in the range of 40.  His blood pressure is 108/59, respiratory rate is in the teens.  His sats are adequate on room air.   GENERAL:  Elderly No acute distress, appropriate, interactive.   HEENT:  Normocephalic, atraumatic.  Pupils equal, round, reactive to light and accommodation.  Extraocular muscles intact.  Sclerae nonicteric.  Conjunctivae not injected.  External auditory canals and nares are visually patent.  His pharynx is without lesion.   CHEST:  Symmetric and clear.   CARDIOVASCULAR:  Bradycardic.   ABDOMEN:  Soft.   EXTREMITIES:  Warm, perhaps trace edema.   NEUROLOGIC:  Grossly nonfocal.   PSYCHIATRIC:  Flat.      LABORATORY DATA:  Reviewed.  Diagnostic imaging studies noted.  EKG noted without absence.  EKG reports reviewed.         FALGUNI MOLINA MD             D: 2021   T: 2021   MT: ARELI      Name:     GALILEA LUGO   MRN:      [id number]        Account:       [id number]   :      1945           Consult Date:  2021      Document: [id number]

## 2021-04-02 NOTE — PROVIDER NOTIFICATION
MD Notification    Notified Person: MD    Notified Person Name: Dr. Toney    Notification Date/Time: 4/2/21 1015    Notification Interaction: Text paged    Purpose of Notification: HR maintain in 20s    Orders Received:    Comments:

## 2021-04-03 NOTE — PROGRESS NOTES
EP CARDIOLOGY PROGRESS NOTE  Jorge Rosenthal MD    76-year-old male with numerous chronic medical problems (CKD, prostate cancer, prior DVT/PE, low-grade B-cell lymphoproliferative disorder, liver disease, antiphospholipid antibody syndrome, alcoholism) admitted with rectal bleeding (hemoglobin 9.1), acute renal failure (creatinine 6.8) and severe bradycardia.    A temporary pacemaker was placed yesterday via the right IJ.  Continuous pacing overnight.     Today he is alert but asks repetitive questions.  When I gradually decreased the pacing rate on his temporary pacemaker from 60 ppm to 30 ppm, there was an underlying escape rhythm around 30.      Echocardiography yesterday showed LVEF 45 to 50% with flattened septum, severe AS, moderate MR, moderate TR, indirect evidence for significant pulmonary hypertension.  -----------------------------------------------------------------    ASSESSMENT:  Chronically ill gentleman with multiple serious medical problems, several acute.  1. Severe narrow QRS bradycardia with low amplitude atrial activity.  Most likely rhythm is atrial fibrillation with complete AV block.  Possibly aggravated by high K.  Potassium level has decreased from 6.1 to 5.1, but the patient remains severely bradycardic.  2. Severe aortic stenosis.  3. LVEF 45 to 50%.  4. Acute renal failure with associated hyperkalemia.   5. Lower GI bleeding.  6. Prior DVT/PE.    PLAN:  1. Maintain temporary pacemaker.  Suspect the patient has underlying conduction system disease and will require permanent pacemaker.  2. His aortic stenosis is severe.  He is not a candidate for surgical or percutaneous intervention.    Prognosis is very guarded.    Total Time: 35 minutes;   fifteen minutes spent in direct communication with patient / family and care coordination.             Interval History:     Repetitive questioning, alert, no acute distress          Review of Systems:     CONSTITUTIONAL: NEGATIVE for fever, chills,  "change in weight  ENT/MOUTH: NEGATIVE for ear, mouth and throat problems  RESP: NEGATIVE for significant cough or SOB  CV: NEGATIVE for chest pain, palpitations or peripheral edema          Physical Exam:      Blood pressure 95/69, pulse 60, temperature 97.6  F (36.4  C), temperature source Oral, resp. rate 17, height 1.854 m (6' 1\"), weight 97 kg (213 lb 14.4 oz), SpO2 94 %.  Vitals:    04/01/21 1156 04/02/21 0600 04/03/21 0540   Weight: 90.7 kg (200 lb) 102.4 kg (225 lb 12 oz) 97 kg (213 lb 14.4 oz)     Vital Signs with Ranges  Temp:  [93.9  F (34.4  C)-97.7  F (36.5  C)] 97.6  F (36.4  C)  Pulse:  [28-60] 60  Resp:  [8-22] 17  BP: ()/(43-84) 95/69  SpO2:  [93 %-99 %] 94 %  I/O's Last 24 hours  I/O last 3 completed shifts:  In: 1904.58 [P.O.:150; I.V.:1454.58]  Out: 375 [Urine:375]    GENERAL: As above  HEENT:  normocephalic, atraumatic.  NECK: Elevated JVP.  LUNGS: Moderately bilaterally  CARDIOVASCULAR: Paced RRR, 2/6 murmur or rub  ABDOMEN: soft, NT, no apparent hepatosplenomegaly  EXTREMITIES:  no edema  VASCULAR:  2+ carotids, 2+ radial pulses           Medications:          folic acid  1 mg Oral Daily     pantoprazole (PROTONIX) IV  40 mg Intravenous BID     sodium chloride (PF)  3 mL Intracatheter Q8H     thiamine  200 mg Oral TID    Followed by     thiamine  100 mg Oral TID    Followed by     [START ON 4/9/2021] thiamine  100 mg Oral Daily            Data:      All new lab and imaging data was reviewed.   Recent Labs   Lab Test 04/03/21  0551 04/02/21  2139 04/02/21  1211 04/02/21  0648 04/01/21  1206 04/01/21  1206 12/07/20  1308 12/07/20  1308 09/14/20  1150   WBC 4.5  --   --  5.4  --  4.9   < >  --  3.3*   HGB 8.2* 8.5* 9.4* 9.4*   < > 9.3*   < >  --  10.5*     --   --  98  --  101*   < >  --  84   PLT 94*  --   --  162  --  158   < > 101* 118*   INR  --   --   --   --   --  1.56*  --  1.54* 1.66*    < > = values in this interval not displayed.      Recent Labs   Lab Test 04/03/21  0551 " 21  2139 21  1532 21  0648 21  1817   *  --   --  127* 129*   POTASSIUM 5.1 5.4* 6.1* 6.0* 5.8*   CHLORIDE 95  --   --  97 98   CO2 27  --   --     BUN 81*  --   --  83* 78*   CR 6.17*  --   --  6.29* 6.65*   ANIONGAP 7  --   --  9 9   BENNIE 8.2*  --   --  8.5 8.7   *  --   --  29* 59*     Recent Labs   Lab Test 19  1029 19  0710 19  1625   TROPI 0.433* 0.027 0.030         EKG results:  Reviewed     Echo Results:  No results found for this or any previous visit (from the past 4320 hour(s)).    Imaging:   Recent Results (from the past 24 hour(s))   Echocardiogram Complete    Narrative    [id number]  [id number]  [id number]  [id number]^SAUL^KB^LANE     [facility] [place]  Echocardiography Laboratory  [address] [place], [place] [address]     Name: GALILEA LUGO  MRN: [id number]  : 1945  Study Date: 2021 09:04 AM  Age: 76 yrs  Gender: Male  Patient Location: [facility]  Reason For Study: Bradycardia - Sinus  Ordering Physician: KB HUGHES  Referring Physician: CHRIST SUAZO  Performed By: Teressa Griffiths     BSA: 2.3 m2  Height: 73 in  Weight: 225 lb  HR: 31  BP: 134/105 mmHg  ______________________________________________________________________________  Procedure  Complete Portable Echo Adult.  ______________________________________________________________________________  Interpretation Summary     There is mild concentric left ventricular hypertrophy.  The visual ejection fraction is estimated at 45-50%.  Flattened septum is consistent with RV pressure/volume overload.  There is severe apical wall hypokinesis.  The right ventricle is mild to moderately dilated.  The right ventricular systolic function is mild to moderately reduced.  There is mod-severe biatrial enlargement.  There is moderate (2+) mitral regurgitation.  There is moderate (2+) tricuspid regurgitation.  Dilation of the inferior vena cava is present with abnormal  respiratory  variation in diameter.  Right ventricular systolic pressure is elevated, consistent with severe  pulmonary hypertension.  Severe valvular aortic stenosis, DIVINA 0.8 cm2. Significantly worse compared  with study from 2019.  ______________________________________________________________________________  Left Ventricle  The left ventricle is borderline dilated. There is mild concentric left  ventricular hypertrophy. Left ventricular diastolic function is abnormal. The  visual ejection fraction is estimated at 45-50%. Flattened septum is  consistent with RV pressure/volume overload. There is severe apical wall  hypokinesis.     Right Ventricle  The right ventricle is mild to moderately dilated. The right ventricular  systolic function is mild to moderately reduced.     Atria  There is mod-severe biatrial enlargement. The right atrium is severely  dilated.     Mitral Valve  There is moderate to severe mitral annular calcification. The mitral valve  leaflets appear thickened, but open well. There is moderate (2+) mitral  regurgitation.     Tricuspid Valve  Tricuspid leaflets are thickened. Right ventricular systolic pressure is  elevated, consistent with severe pulmonary hypertension. There is moderate  (2+) tricuspid regurgitation.     Aortic Valve  Severe valvular aortic stenosis.     Pulmonic Valve  Normal pulmonic valve. There is trace pulmonic valvular regurgitation.     Vessels  Mild aortic root dilatation. The ascending aorta is Mildly dilated. Dilation  of the inferior vena cava is present with abnormal respiratory variation in  diameter.     Pericardium  There is no pericardial effusion.     Rhythm  The rhythm was undetermined.  ______________________________________________________________________________  MMode/2D Measurements & Calculations  IVSd: 1.2 cm     LVIDd: 5.4 cm  LVIDs: 3.3 cm  LVPWd: 1.1 cm  FS: 39.5 %  LV mass(C)d: 252.2 grams  LV mass(C)dI: 111.5 grams/m2  Ao root diam: 4.1 cm  LA  dimension: 5.2 cm  asc Aorta Diam: 4.3 cm  LA/Ao: 1.3  LVOT diam: 2.4 cm  LVOT area: 4.7 cm2  LA Volume (BP): 168.0 ml  LA Volume Index (BP): 74.3 ml/m2  RWT: 0.42     Doppler Measurements & Calculations  MV E max canelo: 135.0 cm/sec  MV A max canelo: 55.7 cm/sec  MV E/A: 2.4  MV max P.3 mmHg  MV mean P.6 mmHg  MV V2 VTI: 60.7 cm  MVA(VTI): 1.7 cm2  MV P1/2t max canelo: 170.1 cm/sec  MV P1/2t: 54.0 msec  MVA(P1/2t): 4.1 cm2  MV dec slope: 922.6 cm/sec2  MV dec time: 0.21 sec  Ao V2 max: 374.6 cm/sec  Ao max P.1 mmHg  Ao V2 mean: 245.5 cm/sec  Ao mean P.0 mmHg  Ao V2 VTI: 116.4 cm  DIVINA(I,D): 0.89 cm2  DIVINA(V,D): 0.88 cm2  LV V1 max P.0 mmHg  LV V1 max: 70.4 cm/sec  LV V1 VTI: 22.2 cm  SV(LVOT): 104.0 ml  SI(LVOT): 46.0 ml/m2  PA V2 max: 101.8 cm/sec  PA max P.1 mmHg  PA acc time: 0.09 sec  TR max canelo: 401.8 cm/sec  TR max P.6 mmHg  AV Canelo Ratio (DI): 0.19  DIVINA Index (cm2/m2): 0.39  E/E' av.5  Lateral E/e': 25.8  Medial E/e': 35.3     ______________________________________________________________________________  Report approved by: Telma De Luna 2021 11:30 AM         Cardiac Catheterization    Narrative    1.  Successful RIJ approach placement of temporary pacemaker.  No   complications.

## 2021-04-03 NOTE — PLAN OF CARE
Patient is bedrest temporal pacemaker total dependent at 60. Tolerated food not much appetite. Pain controlled with IV dilaudid. Family at bedside. Incontinent of stool, Levin is patent. VSS RA sat 90's. Patient refuses to turn on first step mattress.

## 2021-04-03 NOTE — CONSULTS
[facility]    Hematology / Oncology Consultation     Date of Admission:  4/1/2021    Assessment & Plan   Antonio Ramirez is a 76 year old male who was admitted on 4/1/2021. I was asked to see the patient for history of antiphospholipid syndrome, management of anticoagulation.     Assessment:  Plan:  1.  Antiphospholipid syndrome with history of PE.   -Patient has been on chronic anticoagulation.   -Would recommend to keep anticoagulation on hold for now given recent GI bleeding, and bleeding diastasis.   -We will check anticardiolipin antibody levels again.     2.  Bleeding diastasis  -Patient likely has coagulopathy from liver disease and uremia.   -Patient's INR is elevated fibrinogen levels are low likely from liver disease.  -Please give vitamin K 10 mg 1 dose.   -If further bleeding patient may need cryoprecipitate to bring fibrinogen level above 150.     3.  GI bleeding  -Being evaluated by gastroenterology, would need aggressive local management of angiodysplastic lesions in his colon given his difficult combination of comorbidities and liver dysfunction.   -Transfuse to keep hemoglobin above 8.     4.  Atrial fibrillation/slow ventricular response.   -Being followed by cardiology has a temporary pacemaker wire.     5.  Acute on chronic kidney disease. ?  Hepatorenal syndrome?  ATN  -Being followed by nephrology    Yonatan Ovalle MD   [facility]  Office:  [phone]    Code Status    Full Code    Reason for Consult   Reason for consult: History of antiphospholipid syndrome.     Primary Care Physician   Main [facility]    Chief Complaint   GI bleeding.       History of Present Illness   Antonio Ramirez is a 76 year old male with a complex past medical history, including history of antiphospholipid syndrome, diagnosed in 2007 when patient presented with a saddle pulmonary embolism.  He was found to have positive anticardiolipin antibody on multiple occasions.  Patient follows with Dr. Raymond in  [facility].  Patient also had evidence of a low-grade lymphoproliferative disorder on his bone marrow biopsy for which he never required any treatment.   He had an IVC filter, and has been on chronic anticoagulation.   Other past medical history includes hypertension dyslipidemia GERD, history of alcoholic liver disease with cirrhosis and ascites.  In the past patient has needed repeated paracentesis.  Other medical history includes coronary artery disease in proximal atrial fibrillation ischemic cardiomyopathy.   Patient reported that within the last year also patient had multiple episodes of GI bleeding associated with angiodysplasia.  He was recently hospitalized in in March with GI bleeding from 2 3 16-3 19 because of acute GI bleed secondary to angiodysplastic lesions in the colon while on anticoagulation with Eliquis.  Eliquis was held for 1 week during the hospitalization and was restarted on 3/27/2021.     He is now admitted again through the ER with complaints of generalized weakness fatigue tiredness and bleeding per rectum for last 3 to 4 days.  Patient reported that he was noticing about half a cup of bright red blood per bowel movement he was nauseated and vomiting patient denies any chest pain shortness of breath.  Patient reported continues to drink vodka 3 drinks daily.  In the ER he was also hypotensive bradycardic and his creatinine was found to be elevated from a baseline of 1.2-6.83.     He is now in cardiology unit and has a temporary pacing wire.  His Eliquis is on hold.   Review of his labs show sodium 129 potassium 5.1 creatinine 6.17 his bilirubin was 1 and transaminases were normal albumin was 3.2.  Iron profile showed decreased iron at 12 and iron saturation percent 5%.   CBC showed white blood cell count 4.5 hemoglobin 8.2 platelets 94.  Coags show INR 1.56 fibrinogen 134.       Past Medical History   I have reviewed this patient's medical history and updated it with pertinent  information if needed.   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)     ?When diagnosed, patient not aware of this     Monoclonal gammopathy      Neuropathy      Noninfectious ileitis     diverticulitis of colon     Paroxysmal atrial fibrillation (H)      PE (pulmonary embolism) 2006    saddle emboli 2006     Prostate cancer (H) 2000    Treated with radiation (seed implants in 2000) -- no problems since     Pulmonary hypertension (H)      Renal disease     kidney stones     Syncope      Thrombocytopenia (H)      Urethral stricture      Valvular heart disease     moderate aortic stenosis, mild mitral stenosis     Venous thrombosis     IVC filter and lysis procedures 2007       Past Surgical History   I have reviewed this patient's surgical history and updated it with pertinent information if needed.  Past Surgical History:   Procedure Laterality Date     AMPUTATE LEG BELOW KNEE Left 04/2014    related to gangrene, started as foot ulcer related to neuropathy     AMPUTATE LEG BELOW KNEE Left 12/4/2017    Procedure: AMPUTATE LEG BELOW KNEE;  Exploration of Left Leg Below Knee Amputation Stump with Ligation of Bleeders.;  Surgeon: Leandro Arreola MD;  Location:  OR     APPENDECTOMY       APPLY WOUND VAC Left 12/6/2017    Procedure: APPLY WOUND VAC;;  Surgeon: Saima Howard MD;  Location:  SD     ARTHROPLASTY HIP Right 11/27/2018    Procedure: RIGHT TOTAL HIP  ARTHROPLASTY;  Surgeon: Saima Howard MD;  Location:  OR     ARTHROPLASTY KNEE Right 9/19/2014    Procedure: ARTHROPLASTY KNEE;  Surgeon: Saima Howard MD;  Location:  OR     CARDIAC SURGERY      CABG     CHOLECYSTECTOMY       COLONOSCOPY       COLONOSCOPY N/A 4/1/2020    Procedure: COLONOSCOPY;  Surgeon: Emeka Freeman DO;  Location:  GI     COMBINED CYSTOSCOPY, RETROGRADES, EXCHANGE STENT URETER(S) Left 7/24/2018    Procedure: COMBINED CYSTOSCOPY, RETROGRADES, EXCHANGE STENT URETER(S);  CYSTOSCOPY, BILATERAL RETROGRADES, BILATERAL URETERAL STENT EXCHANGE ;  Surgeon: Matt Cervantes MD;  Location:  OR     CRANIOTOMY Right 4/7/2018    Procedure: CRANIOTOMY;  Right Craniotomy For Subdural Hematoma;  Surgeon: Jono Issa MD;  Location:  OR     CYSTOSCOPY, DILATE URETHRA, COMBINED N/A 10/12/2016    Procedure: COMBINED CYSTOSCOPY, DILATE URETHRA;  Surgeon: Dimitry Bello MD;  Location:  OR     CYSTOSCOPY, REMOVE STENT(S), COMBINED Right 7/24/2018    Procedure: COMBINED CYSTOSCOPY, REMOVE STENT(S);;  Surgeon: Jose Hunter MD;  Location:  OR     ENDOSCOPIC STRIPPING VEIN(S)       ENTEROSCOPY SMALL BOWEL N/A 4/8/2020    Procedure: ENTEROSCOPY;  Surgeon: Katherin Boyd MD;  Location:  OR     esophagogastroduodenoscopy       ESOPHAGOSCOPY, GASTROSCOPY, DUODENOSCOPY (EGD), COMBINED N/A 3/11/2019    Procedure: COMBINED ESOPHAGOSCOPY, GASTROSCOPY, DUODENOSCOPY (EGD), BIOPSY SINGLE OR MULTIPLE;  Surgeon: Katherin Boyd MD;  Location:  GI     ESOPHAGOSCOPY, GASTROSCOPY, DUODENOSCOPY (EGD), COMBINED N/A 9/27/2019    Procedure: ESOPHAGOGASTRODUODENOSCOPY, WITH FOREIGN BODY REMOVAL;  Surgeon: Raegan Mckeon MD;  Location:  GI     ESOPHAGOSCOPY, GASTROSCOPY, DUODENOSCOPY (EGD), COMBINED N/A 4/1/2020    Procedure: ESOPHAGOGASTRODUODENOSCOPY (EGD);  Surgeon: Emeka Freeman DO;  Location:  GI     ESOPHAGOSCOPY, GASTROSCOPY,  DUODENOSCOPY (EGD), COMBINED N/A 4/9/2020    Procedure: Esophagoscopy, gastroscopy, duodenoscopy (EGD), combined;  Surgeon: Katherin Byod MD;  Location:  GI     ESOPHAGOSCOPY, GASTROSCOPY, DUODENOSCOPY (EGD), COMBINED N/A 3/17/2021    Procedure: ESOPHAGOGASTRODUODENOSCOPY (EGD);  Surgeon: Rahul Nagy MD;  Location:  GI     EYE SURGERY      cataracts     GENITOURINARY SURGERY      Prostate Seen Implants     HERNIA REPAIR      Umbilical     INCISION AND DRAINAGE LOWER EXTREMITY, COMBINED Left 12/1/2017    Procedure: COMBINED INCISION AND DRAINAGE LOWER EXTREMITY;  INCISION AND DRAINAGE OF LEFT BELOW KNEE AMPUTATION ABSCESS, WOUND VAC PLACEMENT;  Surgeon: Saima Howard MD;  Location:  OR     IR IVC FILTER PLACEMENT       IR VISCERAL ANGIOGRAM  4/14/2020     IRRIGATION AND DEBRIDEMENT LOWER EXTREMITY, COMBINED Left 12/6/2017    Procedure: COMBINED IRRIGATION AND DEBRIDEMENT LOWER EXTREMITY;  IRRIGATION AND DEBRIDEMENT OF LEFT BELOW KNEE AMPUTATION SITE WITH WOUND VAC REPLACEMENT;  Surgeon: Saima Howard MD;  Location:  SD     IRRIGATION AND DEBRIDEMENT LOWER EXTREMITY, COMBINED Left 12/8/2017    Procedure: COMBINED IRRIGATION AND DEBRIDEMENT LOWER EXTREMITY;  IRRIGATION AND DEBRIDEMENT OF LEFT BELOW THE KNEE AMPUTATION AND SHORTENING OF THE TIBIA WITH WOUND CLOSURE;  Surgeon: Saima Howard MD;  Location:  OR     LASER HOLMIUM LITHOTRIPSY URETER(S), INSERT STENT, COMBINED Bilateral 6/28/2018    Procedure: COMBINED CYSTOSCOPY, URETEROSCOPY, LASER HOLMIUM LITHOTRIPSY URETER(S), INSERT STENT;  CYSTOSCOPY, BILATERAL URETEROSCOPY,  HOLMIUM LASER LITHOTRIPSY, BILATERAL STENT PLACEMENT, STONE BASKETING;  Surgeon: Jose Hunter MD;  Location:  OR     LASER HOLMIUM LITHOTRIPSY URETER(S), INSERT STENT, COMBINED Left 8/14/2018    Procedure: COMBINED CYSTOSCOPY, URETEROSCOPY, LASER HOLMIUM LITHOTRIPSY URETER(S), INSERT STENT;  CYSTOSCOPY, LEFT URETEROSCOPY, LEFT LASER HOLMIUM  "LITHOTRIPSY URETER(S) REMOVAL BILATERAL STENTS;  Surgeon: Jose Hunter MD;  Location:  OR     ORTHOPEDIC SURGERY      (R) knee scope     ORTHOPEDIC SURGERY      total hip      ORTHOPEDIC SURGERY      elbow surgery       Prior to Admission Medications   Prior to Admission Medications   Prescriptions Last Dose Informant Patient Reported? Taking?   apixaban ANTICOAGULANT (ELIQUIS) 2.5 MG tablet 3/31/2021 at Unknown time  No Yes   Sig: Take 1 tablet (2.5 mg) by mouth daily Hold this medication for 1 week after discharge.  Restart taking it on 3/27/2021.   aspirin 81 MG EC tablet 3/31/2021 at Unknown time Self Yes Yes   Sig: Take 81 mg by mouth daily   nadolol (CORGARD) 20 MG tablet   No No   Sig: Take 1 tablet (20 mg) by mouth daily   pantoprazole (PROTONIX) 40 MG EC tablet   No No   Sig: Take 1 tablet (40 mg) by mouth every morning (before breakfast)      Facility-Administered Medications: None     Allergies   Allergies   Allergen Reactions     Blood Transfusion Related (Informational Only) Other (See Comments)     Patient has a history of a clinically significant antibody against RBC antigens.  A delay in compatible RBCs may occur.     Kdc:Minocycline+[place] Blue Fcf GI Disturbance     16-PT IS NOT AWARE OF THIS REACTION     Ciprofloxacin Itching     Severe itching \"like ants were crawling\"     Hmg-Coa-R Inhibitors Other (See Comments)     Rhabdomyolysis occurred within a couple days  Rhabdomyolysis       Social History   I have reviewed this patient's social history and updated it with pertinent information if needed. Antonio [facility]  reports that he has never smoked. He has never used smokeless tobacco. He reports current alcohol use. He reports that he does not use drugs.    Family History   I have reviewed this patient's family history and updated it with pertinent information if needed.   Family History   Problem Relation Age of Onset     Lung Cancer Mother      Heart Failure Father          " age 87       Review of Systems   On review of system patient reported blood per rectum denies any shortness of breath or chest pain.     Physical Exam   Temp: 97.6  F (36.4  C) Temp src: Oral BP: 101/70 Pulse: 59   Resp: 13 SpO2: 90 % O2 Device: None (Room air)    Vital Signs with Ranges  Temp:  [97.2  F (36.2  C)-97.6  F (36.4  C)] 97.6  F (36.4  C)  Pulse:  [59-60] 59  Resp:  [8-22] 13  BP: ()/(61-80) 101/70  SpO2:  [90 %-99 %] 90 %  213 lbs 14.4 oz    Constitutional: Awake, alert, cooperative, no apparent distress. ECOG PS 2 patient appears chronically ill.   Eyes: Conjunctiva and pupils examined and there is pallor   HEENT: Moist mucous membranes, normal dentition.  Cardiovascular: Patient has a temporary pacing wire  GI: Soft, there is distention     lymph/Hematologic: No anterior cervical or supraclavicular adenopathy.  Skin: Patient has evidence of ecchymosis  Musculoskeletal: Patient left-sided below-knee amputation   neurologic: Cranial nerves 2-12 intact, normal strength and sensation.  Psychiatric: Alert, oriented to person, place and time, no obvious anxiety or depression.    Data   Lab tests were reviewed

## 2021-04-03 NOTE — PROGRESS NOTES
[facility]    Hospitalist Progress Note    Date of Service (when I saw the patient): 04/03/2021    Assessment & Plan   Antonio Ramirez is a 76 year old male who was admitted on 4/1/2021.  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  He now comes back to the ER with complaint of generalized weakness, fatigue, tiredness and bleeding per rectum for the last 3-4 days.      ASSESSMENT AND PLAN:     Acute lower gastrointestinal bleeding:  This is a 76-year-old male with history of GI bleeding in the recent past who was admitted earlier in 03/2021.  He was on Eliquis.  He has an EGD on 03/16, which shows esophageal stenosis benign, esophagitis, portal gastropathy and normal duodenum.  Colonoscopy shows multiple angiodysplastic lesions with bleeding in cecum.  Multiple diverticula in sigmoid region and rectal varices.  Was not bleeding.  He was treated with cautery at that time.  He was asked to hold anticoagulation for about a week.  He did, and now he is back on anticoagulation and bleeding again.  Although his hemoglobin is stable, he slightly hypertensive at this time and bradycardic.    Started on IV fluids and IV Protonix twice daily and octreotide drip on admission   GI consultation with  requested  Hemoglobin stayed stable at 9.3 on admission to 9.4-8.2 today   Patient continues to mild rectal bleeding most likely from hemorrhoid.  And the drop in hemoglobin most likely dilutional as patient has been getting IV fluids since admission continuously  with his renal failure  Started on renal diet today .  Discontinue the octreotide drip today  conservative management for now as per GI      Severe hypoglycemia;  Most likely secondary to the insulin given to treat for the hyperkalemia in the setting of acute kidney injury contributing  Blood sugar was low at 28-29 on 4/2/2021  Blood sugars improved currently so D10 was discontinued    Atrial fibrillation with slow ventricular response with complete heart block with heart rate in the 20s;  Patient is status post temporary pacemaker placement  Patient was on low-dose nadolol 20 mg p.o. daily which has been on hold since admission  Heart rate running really low in the 20s today  EP consultation requested .  Temporary pacemaker placed on 4-2-21  Patient must have some intrinsic conduction system disease and might need permanent pacemaker as per EP today      Acute acute kidney injury on CKD stage III most likely from ATN versus hepatorenal syndrome with history of cirrhosis:   Hyperkalemia   The patient had chronic kidney disease, stage III, with baseline creatinine in the 1.3 to 1.6 range.  It has now increased to 6.83.  He is not having significant vomiting or diarrhea.  He only uses occasionally ibuprofen.  He is slightly hypotensive, though.  The cause of this significant rise in creatinine is uncertain at this time.  This may be hepatorenal syndrome versus ATN as not eating and drinking well.  I will go ahead and check FENA and ultrasound of the kidneys to rule out any hydronephrosis given history of prostate cancer in the past,   Nephrology consulted  Getting IV fluids per nephrology  Renal ultrasound on admission showed no hydronephrosis mildly atrophic left kidney small ascites seen  Creatinine still is elevated at 6.17  Potassium improved from 6.2-5.1 today  Getting IV albumin for nephrology  Patient with a mild abdominal distention with history of ascites needing paracentesis so IV fluids reduced to 50 mL/h  for nephrology today     Hyperkalemia:    This is again secondary to acute renal failure, not making urine, almost anuric at this time.  Keep him on IV fluids.  He received active treatment with IV calcium gluconate, IV insulin and dextrose.  We will put him on IV bicarbonate infusion drip at this time.   Given 30 g of Kayexalate p.o. 2 doses given on 4/2/2021  Potassium improved to 5.1 today  Follow potassium closely      Alcohol abuse and alcohol liver disease and possible acute pancreatitis:  His LFTs are normal, but he has continued to drink alcohol. His Lipase slightly elevated,   He seems like he has some ascites as well.  I counseled him about stopping alcohol.  We will need to keep him on alcohol withdrawal protocol.  We will check CIWA and give him lorazepam according to CIWA if needed.  No active signs of alcohol withdrawal currently      History of ischemic cardiomyopathy:   Echo done today showed EF of 45 to 50%.  2+ MR 2+ TR and severe valvular aortic stenosis with valve area of 0.8 cm    Does not look like florid exacerbation.  Will have to hold his nadolol due to bradycardia.    Monitor fluid status closely as he is at risk of fluid overload  Patient with a moderate abdominal distention most likely from ascites and he gets as needed paracentesis with ascites      Paroxysmal atrial fibrillation/anticardiolipin antibody syndrome, history of PE in the past:  As per record, he may have anticardiolipin antibody, but he is on Eliquis 2.5 mg daily only.  It is causing more complications then benefits at this point.  We need to readdress about his anticoagulation as he is unable to tolerate even the lowest dose of 2.5 mg daily.  If he needs to be treated for anticardiolipin antibody syndrome, he may need to be on Coumadin, but I think he has been seen in the past by Oncology/Hematology.    Requested [facility] consultation as patient has history of anticardiolipin syndrome with history of PE and  recurrent GI bleeding      History of esophagitis:  On Protonix as mentioned above.         Hyponatremia:  Again, I think it is most likely because of renal failure.  May improve with IV fluids for now.   Sodium is stable at 1 27-1   today  10.  Coronary artery disease:  Status post bypass surgery, stable at this time.   11.  History of diffuse large B-cell lymphoma:  Needs outpatient monitoring at this time.      CODE STATUS:  Full code.          DVT Prophylaxis: Pneumatic Compression Devices in the setting of GI bleed  Code Status: Full Code    Disposition: Expected discharge in 3 to 4 days once creatinine improves and heart rate is stable    Discussed with bedside RN, patient and his wife over the phone  Greater than 35 minutes were spent in taking care of him today including reviewing the chart, collaboration of care and counseling the patient and goals of care discussions with wife    Goals of care discussion was  done with the wife Helena on 4/2/21  she would like to continue current cares including full code for now.  I discussed with her about palliative care consultation regarding goals of care discussions and she would like to hold off on palliative care consultations for now as she met with palliative care with his hospitalizations in the past  Goals remain restorative at this point    Cecelia Garcia MD  [phone] (P)      Interval History      Patient is resting comfortably in bed.  Continues to have mild rectal bleeding most likely from hemorrhoids.  Denies any shortness of breath or chest pain.  Wants to eat regular food.  Temporary pacemaker in place.  Creatinine remains elevated at 6.1 today.  Potassium improved to 5.1 today    -Data reviewed today: I reviewed all new labs and imaging results over the last 24 hours. I personally reviewed labs and imaging since last 24 hours     Physical Exam   Temp: 97.6  F (36.4  C) Temp src: Oral BP: 94/68 Pulse: 60   Resp: 13 SpO2: 95 % O2 Device: None  (Room air)    Vitals:    04/01/21 1156 04/02/21 0600 04/03/21 0540   Weight: 90.7 kg (200 lb) 102.4 kg (225 lb 12 oz) 97 kg (213 lb 14.4 oz)     Vital Signs with Ranges  Temp:  [93.9  F (34.4  C)-97.6  F (36.4  C)] 97.6  F (36.4  C)  Pulse:  [31-60] 60  Resp:  [8-22] 13  BP: ()/(43-84) 94/68  SpO2:  [92 %-99 %] 95 %  I/O last 3 completed shifts:  In: 1904.58 [P.O.:150; I.V.:1454.58]  Out: 375 [Urine:375]    Constitutional: Awake, alert, cooperative, no apparent distress, pallor present.  appears very weak and tired  Respiratory: Clear to auscultation bilaterally, no crackles or wheezing  Cardiovascular: Regular rate and rhythm, normal S1 and S2, and 2+ murmur noted  GI: Normal bowel sounds, soft, moderately distended, non-tender  Skin/Integumen: No rashes, no cyanosis, no edema.  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  Other:     Medications     octreotide (sandoSTATIN) infusion ADULT 25 mcg/hr (04/03/21 0911)     sodium chloride 50 mL/hr (04/03/21 1018)       albumin human  25 g Intravenous Q6H     folic acid  1 mg Oral Daily     iron sucrose (VENOFER) intermittent infusion  200 mg Intravenous Daily     pantoprazole (PROTONIX) IV  40 mg Intravenous BID     sodium chloride (PF)  3 mL Intracatheter Q8H     thiamine  200 mg Oral TID    Followed by     thiamine  100 mg Oral TID    Followed by     [START ON 4/9/2021] thiamine  100 mg Oral Daily       Data   Recent Labs   Lab 04/03/21  0551 04/02/21  2139 04/02/21  1532 04/02/21  1211 04/02/21  0648 04/01/21  1817 04/01/21  1817 04/01/21  1206 04/01/21  1206   WBC 4.5  --   --   --  5.4  --   --   --  4.9   HGB 8.2* 8.5*  --  9.4* 9.4*   < > 8.8*   < > 9.3*     --   --   --  98  --   --   --  101*   PLT 94*  --   --   --  162  --   --   --  158   INR  --   --   --   --   --   --   --   --  1.56*   *  --   --   --  127*  --  129*   < > 127*   POTASSIUM 5.1 5.4* 6.1*  --  6.0*  --  5.8*   < > 6.2*   CHLORIDE 95  --   --   --  97  --  98  --  97   CO2 27   --   --   --  21  --  22  --  20   BUN 81*  --   --   --  83*  --  78*  --  83*   CR 6.17*  --   --   --  6.29*  --  6.65*  --  6.83*   ANIONGAP 7  --   --   --  9  --  9  --  10   BENNIE 8.2*  --   --   --  8.5  --  8.7  --  8.8   *  --   --   --  29*  --  59*  --  110*   ALBUMIN  --   --   --   --  3.2*  --   --   --  3.3*   PROTTOTAL  --   --   --   --  6.9  --   --   --  7.3   BILITOTAL  --   --   --   --  1.0  --   --   --  0.6   ALKPHOS  --   --   --   --  89  --   --   --  93   ALT  --   --   --   --  18  --   --   --  20   AST  --   --   --   --  32  --   --   --  32   LIPASE  --   --   --   --   --   --   --   --  1,294*    < > = values in this interval not displayed.       Recent Results (from the past 24 hour(s))   Cardiac Catheterization    Narrative    1.  Successful RIJ approach placement of temporary pacemaker.  No   complications.

## 2021-04-03 NOTE — PLAN OF CARE
A&O. VSS on Pacer. Pain in R shoulder relieved with oxy, tylenol x1. Pacer site CDI. Good capture. Slow Bleeding persists. Remains oliguric. K+ down to 5.4. Rechecking this AM. Tele V-Paced. Refusing positioning occasionally. Awaiting PPM, planned for Monday. GI, Nephrology following.

## 2021-04-03 NOTE — PROGRESS NOTES
[facility]  Gastroenterology Progress Note     Antonio Ramirez MRN# [id number]   YOB: 1945 Age: 76 year old          Assessment and Plan:     Rectal bleeding    Hyperkalemia    Bradycardia    Acute renal failure, unspecified acute renal failure type (H)  Patient is overall doing about the same patient is feeling better patient is starting to gain more fluid and ascites.  Patient is currently on temporary pacemaker patient still has significantly elevated creatinine patient is being monitored closely by nephrology patient is also on IV albumin.  Patient's hemoglobin is stable patient has minimal rectal bleeding from rectal varices/hemorrhoids.  Patient continues to be oliguric.            Rectal bleeding  Acute renal failure, unspecified acute renal failure type (H)  Hyperkalemia  Bradycardia      Interval History:   no new complaints and doing well; no cp, sob, n/v/d, or abd pain.              Review of Systems:   C: NEGATIVE for fever, chills, change in weight  E/M: NEGATIVE for ear, mouth and throat problems  R: NEGATIVE for significant cough or SOB  CV: NEGATIVE for chest pain, palpitations or peripheral edema             Medications:   I have reviewed this patient's current medications    albumin human  25 g Intravenous Q6H     folic acid  1 mg Oral Daily     iron sucrose (VENOFER) intermittent infusion  200 mg Intravenous Daily     pantoprazole (PROTONIX) IV  40 mg Intravenous BID     sodium chloride (PF)  3 mL Intracatheter Q8H     thiamine  200 mg Oral TID    Followed by     thiamine  100 mg Oral TID    Followed by     [START ON 4/9/2021] thiamine  100 mg Oral Daily                  Physical Exam:   Vitals were reviewed  Vital Signs with Ranges  Temp:  [93.9  F (34.4  C)-97.6  F (36.4  C)] 97.6  F (36.4  C)  Pulse:  [28-60] 60  Resp:  [8-22] 13  BP: ()/(43-84) 94/68  SpO2:  [92 %-99 %] 95 %  I/O last 3 completed shifts:  In: 1904.58 [P.O.:150; I.V.:1454.58]  Out:  375 [Urine:375]  Constitutional: healthy, alert and no distress   Cardiovascular: negative, PMI normal. No lifts, heaves, or thrills. RRR. No murmurs, clicks gallops or rub  Head: Normocephalic. No masses, lesions, tenderness or abnormalities  Neck: Neck supple. No adenopathy. Thyroid symmetric, normal size,, Carotids without bruits.  Abdomen: Abdomen soft, non-tender. BS normal. No masses, organomegaly  SKIN: no suspicious lesions or rashes           Data:   I reviewed the patient's new clinical lab test results.   Recent Labs   Lab Test 04/03/21  0551 04/02/21  2139 04/02/21  1211 04/02/21  0648 04/01/21  1206 04/01/21  1206 12/07/20  1308 12/07/20  1308 09/14/20  1150   WBC 4.5  --   --  5.4  --  4.9   < >  --  3.3*   HGB 8.2* 8.5* 9.4* 9.4*   < > 9.3*   < >  --  10.5*     --   --  98  --  101*   < >  --  84   PLT 94*  --   --  162  --  158   < > 101* 118*   INR  --   --   --   --   --  1.56*  --  1.54* 1.66*    < > = values in this interval not displayed.     Recent Labs   Lab Test 04/03/21  0551 04/02/21  2139 04/02/21  1532 04/02/21  0648 04/01/21  1817   POTASSIUM 5.1 5.4* 6.1* 6.0* 5.8*   CHLORIDE 95  --   --  97 98   CO2 27  --   --  21 22   BUN 81*  --   --  83* 78*   ANIONGAP 7  --   --  9 9     Recent Labs   Lab Test 04/02/21  1211 04/02/21  0648 04/01/21  1206 03/17/21  1300 03/17/21  0655 03/03/21  1236 03/03/21  1236   ALBUMIN  --  3.2* 3.3*  --  2.8*   < >  --    BILITOTAL  --  1.0 0.6  --  1.1   < >  --    ALT  --  18 20  --  18   < >  --    AST  --  32 32  --  32   < >  --    PROTEIN 30*  --   --  10*  --   --  100*   LIPASE  --   --  1,294*  --   --   --   --     < > = values in this interval not displayed.       I reviewed the patient's new imaging results.    All laboratory data reviewed  All imaging studies reviewed by me.    Rahul Nagy MD,  4/3/2021  [facility]  Office : [phone]  Cell: [phone]

## 2021-04-03 NOTE — PROGRESS NOTES
Renal Medicine Progress Note                                Antonio Ramirez MRN# [id number]   Age: 76 year old YOB: 1945   Date of Admission: 4/1/2021 Hospital LOS: 2                  Assessment/Plan:     76-year-old male who presented to the emergency room, dated 04/01/2001 in the setting of rectal bleeding.     Evaluated with respect acute kidney injury and metabolic abnormalities.      1.  CKD 3a   -baseline creatinine 1.5 mg/dl range   -eGFR 60 ml/min range   -secondary hyperparathyroidism   2.  Previous multiple episodes of ARF   -many without full renal recovery   -abnormal creatinines date to at least 2014  3.  Dipstick proteinuria   - mg/gram  4.  ARF   -oliguric   -no obstruction by US   -Edmundo < 5   -pre renal v HRS  5.  Hyperkalemia  6.  Metabolic acidosis  7.  Hypernatremia  8.  Alcoholic cirrhosis   -portal HTN  9.  Bradycardia   -temporary pacer in place      Adjust IVF  Continue 25% albumin  IV Fe    No dialysis at this point         Interval History:     Up in bed  Follows  Some what tangential    IO and UO reviewed  Labs discussed with patient    Limited UO     ROS:     GENERAL: NAD, No fever,chills  R: NEGATIVE for significant cough or SOB  CV: NEGATIVE for chest pain, palpitations  EXT: no change edema  ROS otherwise negative    Medications and Allergies:     Reviewed    Physical Exam:     Vitals were reviewed  Patient Vitals for the past 8 hrs:   BP Temp Temp src Pulse Resp SpO2 Weight   04/03/21 0900 95/69 -- -- 60 17 94 % --   04/03/21 0800 103/69 -- -- 60 13 99 % --   04/03/21 0741 -- 97.6  F (36.4  C) Oral -- -- -- --   04/03/21 0700 108/70 -- -- 60 9 99 % --   04/03/21 0600 107/70 -- -- 60 8 99 % --   04/03/21 0540 -- -- -- -- -- -- 97 kg (213 lb 14.4 oz)   04/03/21 0500 112/72 -- -- 60 11 98 % --   04/03/21 0400 106/65 -- -- 60 16 99 % --   04/03/21 0300 108/61 -- -- 60 18 96 % --   04/03/21 0200 111/69 -- -- 60 18 95 % --     I/O last 3 completed shifts:  In: 1904.58  [P.O.:150; I.V.:1454.58]  Out: 375 [Urine:375]    Vitals:    04/01/21 1156 04/02/21 0600 04/03/21 0540   Weight: 90.7 kg (200 lb) 102.4 kg (225 lb 12 oz) 97 kg (213 lb 14.4 oz)         GENERAL: awake, alert, follows  HEENT: NC/AT, PERRLA, EOMI, non icteric, pharynx moist without lesion  ABDOMEN: soft  MS: no clubbing, cyanosis   SKIN: clear without significant rashes or lesions  NEURO: speech normal and cranial nerves 2-12 intact  PSYCH: affect flat  EXT: warm, no edema    Data:     Recent Labs   Lab 04/03/21  0551 04/02/21  2139 04/02/21  1532 04/02/21  0648 04/01/21  1817 04/01/21  1610 04/01/21  1206   *  --   --  127* 129* 128* 127*   POTASSIUM 5.1 5.4* 6.1* 6.0* 5.8* 5.6* 6.2*   CHLORIDE 95  --   --  97 98  --  97   CO2 27  --   --  21 22  --  20   ANIONGAP 7  --   --  9 9  --  10   *  --   --  29* 59*  --  110*   BUN 81*  --   --  83* 78*  --  83*   CR 6.17*  --   --  6.29* 6.65*  --  6.83*   GFRESTIMATED 8*  --   --  8* 7*  --  7*   GFRESTBLACK 9*  --   --  9* 8*  --  8*   BENNIE 8.2*  --   --  8.5 8.7  --  8.8         Recent Labs   Lab Test 04/03/21  0551 04/02/21  0648 04/01/21  1817 04/01/21  1206 03/17/21  0655 03/16/21  2258 04/26/20  0832 04/25/20  0857 04/24/20  0842 04/23/20  1033   CR 6.17* 6.29* 6.65* 6.83* 1.20 1.09 1.64* 1.67* 1.68* 1.70*     Recent Labs   Lab 04/03/21  0551 04/02/21 2139 04/02/21  1532 04/02/21  0648 04/01/21  1817   POTASSIUM 5.1 5.4* 6.1* 6.0* 5.8*     Recent Labs   Lab 04/02/21  0648 04/01/21  1206   ALBUMIN 3.2* 3.3*     Recent Labs   Lab 04/03/21  0551 04/02/21 2139 04/02/21  1211 04/02/21  0648   PHOS 4.7*  --   --   --    HGB 8.2* 8.5* 9.4* 9.4*       Recent Labs   Lab 04/03/21  0551   PTHI 345*     Recent Labs   Lab 04/02/21  1211   UNAR <5     Recent Labs   Lab 04/03/21  0551   IRON 12*   IRONSAT 5*   *   DANIELLE 84     Recent Labs   Lab 04/02/21  1211   UMALCR 100.00*       G Richard Lee MD    [facility] - Nephrology  [phone]

## 2021-04-04 NOTE — PROGRESS NOTES
MD Notification    Notified Person: MD    Notified Person Name:Emely Hunt LEA     Notification Date/Time: 4/4/2021 6:39 AM    Notification Interaction: Paged    Purpose of Notification: Pt w/ increased agitation, attempting to pull off Bipap, stating he does not want the Bipap on and doesn't care if he doesn't live anymore. Hitting at staff. Refusing to answer orientation question.     Orders Received:    Comments:

## 2021-04-04 NOTE — PROGRESS NOTES
EP CARDIOLOGY PROGRESS NOTE  Jorge Rosenthal MD    76-year-old male with numerous chronic medical problems (CKD, prostate cancer, prior DVT/PE, low-grade B-cell lymphoproliferative disorder, liver disease, antiphospholipid antibody syndrome, alcoholism) admitted on 04/01/21 with rectal bleeding (hemoglobin 9.1), acute renal failure (creatinine 6.8) and severe bradycardia.     A temporary pacemaker was placed on 04/02 via the right IJ.  Echocardiography showed LVEF 45-50% with flattened septum, severe AS, moderate MR, moderate TR, indirect evidence for significant pulmonary hypertension.     Last night the patient became agitated and pulled his temporary pacemaker.  Heart rate dropped in the 30s.  After atropine and dopamine infusion HR increased to 50 bpm.  The temporary pacemaker was repositioned in the Cath Lab earlier this morning by Dr. Wang.  Unfortunately, it no longer captures.    Heart rate is now in around 50 on dopamine 5 mcg/kg/min.  The patient is alert but on BiPAP to maintain oxygen saturation around 90%.  SBP is 100 mmHg on dopamine.      -----------------------------------------------------------------    ASSESSMENT:  Critically ill 76-year-old male with multiple acute on chronic medical problems.  1. Bradycardia due to complete AV block.  2. Severe/critical aortic stenosis.  3. Cardiomyopathy EF 45%.  4. Acute renal failure.  He is volume overloaded and hypoxemic.  5. Lower GI bleeding  6. Prior DVT/PE.  7. ?  Moderate baseline dementia.    PLAN:  1. We need to establish goals of care.  Given the number of severe acute medical issues the patient's prognosis is poor.  From a cardiac standpoint, the patient is a poor candidate for correction of aortic stenosis either percutaneously or surgically.  Placement of a permanent pacemaker would be pointless without addressing his aortic stenosis and acute renal failure (which appears to require dialysis at this point).    Total Time: 35 minutes;   20 minutes  "spent in direct communication with patient / family and care coordination.             Interval History:     Alert, confused, on BiPAP          Review of Systems:     Limited ROS cannot be obtained.  The patient is on BiPAP with borderline oxygen saturation.          Physical Exam:      Blood pressure 114/68, pulse 52, temperature 97.6  F (36.4  C), temperature source Oral, resp. rate 25, height 1.854 m (6' 1\"), weight 102.2 kg (225 lb 3.2 oz), SpO2 97 %.  Vitals:    04/02/21 0600 04/03/21 0540 04/04/21 0634   Weight: 102.4 kg (225 lb 12 oz) 97 kg (213 lb 14.4 oz) 102.2 kg (225 lb 3.2 oz)     Vital Signs with Ranges  Pulse:  [37-75] 52  Resp:  [8-31] 25  BP: ()/(38-84) 114/68  FiO2 (%):  [100 %] 100 %  SpO2:  [86 %-100 %] 97 %  I/O's Last 24 hours  I/O last 3 completed shifts:  In: 2583.08 [P.O.:760; I.V.:1523.08]  Out: 150 [Urine:150]    GENERAL: Elderly gentleman on BiPAP.  HEENT:  normocephalic, atraumatic.  NECK:  ?JVP.  LUNGS:  clear bilaterally, no wheezes  CARDIOVASCULAR: Regular bradycardia around 50, no gallop, murmur or rub  ABDOMEN: soft, NT, no apparent hepatosplenomegaly  EXTREMITIES:  no edema  VASCULAR:  1+ carotids           Medications:          dextrose  50 mL Intravenous Once     folic acid  1 mg Oral Daily     iron sucrose (VENOFER) intermittent infusion  200 mg Intravenous Daily     pantoprazole (PROTONIX) IV  40 mg Intravenous BID     sodium chloride (PF)  3 mL Intracatheter Q8H     thiamine  100 mg Oral TID    Followed by     [START ON 4/9/2021] thiamine  100 mg Oral Daily            Data:      All new lab and imaging data was reviewed.   Recent Labs   Lab Test 04/04/21  0315 04/03/21  1817 04/03/21  0551 04/02/21  0648 04/02/21  0648 04/01/21  1206 04/01/21  1206 12/07/20  1308 12/07/20  1308   WBC 6.5  --  4.5  --  5.4  --  4.9   < >  --    HGB 8.0* 8.3* 8.2*   < > 9.4*   < > 9.3*   < >  --    *  --  100  --  98  --  101*   < >  --    *  --  94*  --  162  --  158   < > " 101*   INR 1.97*  --   --   --   --   --  1.56*  --  1.54*    < > = values in this interval not displayed.      Recent Labs   Lab Test 04/04/21  0315 04/03/21  0551 04/02/21  2139 04/02/21  0648 04/02/21  0648   * 129*  --   --  127*   POTASSIUM 4.5 5.1 5.4*   < > 6.0*   CHLORIDE 94 95  --   --  97   CO2 28 27  --   --  21   BUN 80* 81*  --   --  83*   CR 6.14* 6.17*  --   --  6.29*   ANIONGAP 8 7  --   --  9   BENNIE 7.8* 8.2*  --   --  8.5   GLC 86 144*  --   --  29*    < > = values in this interval not displayed.     Recent Labs   Lab Test 06/11/19  1029 06/11/19  0710 02/28/19  1625   TROPI 0.433* 0.027 0.030         EKG results:  Reviewed    Imaging:   Recent Results (from the past 24 hour(s))   XR Chest Port 1 View    Narrative    EXAM: XR CHEST PORT 1 VW  LOCATION: [facility]  DATE/TIME: 4/4/2021 3:04 AM    INDICATION: Loss of pacemaker capture  COMPARISON: 03/16/2021      Impression    IMPRESSION: Cardiac silhouette is enlarged. Status post sternotomy. Right internal jugular transvenous pacer lead in place, distal tip obscured by overlying monitoring leads. Developing central interstitial infiltrates. Small pleural effusions. No   visible pneumothorax.   Cardiac Catheterization    Narrative    1.  Successful repositioning of the temporary pacemaker lead from the   right atrium into the right ventricle.  2.  Discussed with primary team that capture is relatively poor, with a   capture threshold of around 4 to 5 mA.  However, as his heart rates on   dopamine are stable in the 60s without pacing, the temporary pacemaker   does not appear necessary at present.  We agreed that for now we will hold   off on placing a femoral temporary pacemaker, which would be more stable   and likely have better capture, but would also increase risk of infection.

## 2021-04-04 NOTE — PROGRESS NOTES
SPIRITUAL HEALTH SERVICES Progress Note  [facility] phone line call from [facility] - Ptnt's wife requesting a , as Ptnt on [facility] and actively dying.    I rang [facility] and asked him to come.    I received another phone call from the [facility] that Ptnt Sage's death was imminent, so I returned to visit with spouse Helena, and offered emotional support and prayer as Sage passed away.    SH continues to be available.    Deborah Ch  Chaplain Resident

## 2021-04-04 NOTE — PROGRESS NOTES
Called to pronounce patient   Pt unresponsive   NO HR and RR  Time of death: 11:20AM on 4/4/21    Cecelia Garcia MD

## 2021-04-04 NOTE — PLAN OF CARE
Patient wanted to be comfort care this morning. Patient wife was at bedside, patient passed away at 1120.

## 2021-04-04 NOTE — PROGRESS NOTES
[facility]    Hospitalist Progress Note    Date of Service (when I saw the patient): 04/04/2021    Assessment & Plan   Antonio Ramirez is a 76 year old male who was admitted on 4/1/2021.  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  He now comes back to the ER with complaint of generalized weakness, fatigue, tiredness and bleeding per rectum for the last 3-4 days.       Patient became agitated overnight and pulled out his temporary pacemaker.  He also had a worsening hypoxia with respiratory failure needing BiPAP.  Cath Lab was activated and temporary pacemaker tried to be repositioned but has not been capturing well.  Patient's abdomen is more distended so IV fluids were stopped.  He was started on a dopamine drip to help with the heart rate.  Events reviewed with the EP and nephrology this morning.  His creatinine remains elevated at 6 and he is oliguric only made 150 cc urine output over the last 24 hours.  His prognosis is very poor with multiorgan failure with severe aortic stenosis.  Goals of care discussion was done with the patient and his wife at bedside today.  Patient told the wife that he does not want all these measures we are doing to keep him alive.  Patient and wife decided to go with comfort care measures only to be kept comfortable.  Dopamine drip was discontinued.  Comfort measures initiated.  Social work consultation requested for hospice meeting and discharge disposition    Patient is transition to comfort care only on 4/4/2021      ASSESSMENT AND PLAN:     Acute lower gastrointestinal bleeding:  This is a 76-year-old male with history of GI bleeding in the recent past who was admitted earlier in 03/2021.  He was on Eliquis.  He has an EGD on 03/16, which shows esophageal stenosis benign, esophagitis, portal gastropathy and normal duodenum.  Colonoscopy shows multiple angiodysplastic lesions with bleeding in cecum.  Multiple diverticula in sigmoid region and rectal varices.  Was not bleeding.  He was treated with cautery at that time.  He was asked to hold anticoagulation for about a week.  He did, and now he is back on anticoagulation and bleeding again.  Although his hemoglobin is stable, he slightly hypertensive at this time and bradycardic.    Started on IV fluids and IV Protonix twice daily and octreotide drip on admission   GI consultation with  requested  Hemoglobin stayed stable at 9.3 on admission to 9.4-8.2 today   Patient continues to mild rectal bleeding most likely from hemorrhoid.  And the drop in hemoglobin most likely dilutional as patient has been getting IV fluids since admission continuously with his renal failure  Started on renal diet today .  Discontinue the octreotide drip today  conservative management for now as per GI  Patient is transition to comfort care only  Severe hypoglycemia;  Most likely secondary to the insulin given to treat for the hyperkalemia in the setting of acute kidney injury contributing  Blood sugar was low at 28-29 on 4/2/2021  Blood sugars improved currently so D10 was discontinued  Comfort cares only    Atrial fibrillation with slow ventricular response with complete heart block with heart rate in the 20s;  Patient is status post temporary pacemaker placement  Patient was on low-dose nadolol 20 mg p.o. daily which has been on hold since admission  Heart rate running really low in the 20s today  EP consultation requested .  Temporary pacemaker placed on 4-2-21  Patient must have some  intrinsic conduction system disease and might need permanent pacemaker as per EP   Comfort cares only        Acute acute kidney injury on CKD stage III most likely from ATN versus hepatorenal syndrome with history of cirrhosis:   Hyperkalemia   The patient had chronic kidney disease, stage III, with baseline creatinine in the 1.3 to 1.6 range.  It has now increased to 6.83.  He is not having significant vomiting or diarrhea.  He only uses occasionally ibuprofen.  He is slightly hypotensive, though.  The cause of this significant rise in creatinine is uncertain at this time.  This may be hepatorenal syndrome versus ATN as not eating and drinking well.  I will go ahead and check FENA and ultrasound of the kidneys to rule out any hydronephrosis given history of prostate cancer in the past,   Nephrology consulted  Getting IV fluids per nephrology  Renal ultrasound on admission showed no hydronephrosis mildly atrophic left kidney small ascites seen  Creatinine still is elevated at 6.17  Potassium improved from 6.2-5.1 today  Getting IV albumin for nephrology stop now  Comfort cares only     Hyperkalemia:    This is again secondary to acute renal failure, not making urine, almost anuric at this time.  Keep him on IV fluids.  He received active treatment with IV calcium gluconate, IV insulin and dextrose.  We will put him on IV bicarbonate infusion drip at this time.   Given 30 g of Kayexalate p.o. 2 doses given on 4/2/2021  Potassium improved to 5.1 today  Follow potassium closely  Comfort cares only      Alcohol abuse and alcohol liver disease and possible acute pancreatitis:  His LFTs are normal, but he has continued to drink alcohol. His Lipase slightly elevated,   He seems like he has some ascites as well.  I counseled him about stopping alcohol.  We will need to keep him on alcohol withdrawal protocol.  We will check CIWA and give him lorazepam according to CIWA if needed.  No active signs of alcohol withdrawal  currently      History of ischemic cardiomyopathy:   Echo done today showed EF of 45 to 50%.  2+ MR 2+ TR and severe valvular aortic stenosis with valve area of 0.8 cm    Does not look like florid exacerbation.  Will have to hold his nadolol due to bradycardia.    Monitor fluid status closely as he is at risk of fluid overload  Patient with a moderate abdominal distention most likely from ascites and he gets as needed paracentesis with ascites      Paroxysmal atrial fibrillation/anticardiolipin antibody syndrome, history of PE in the past:  As per record, he may have anticardiolipin antibody, but he is on Eliquis 2.5 mg daily only.  It is causing more complications then benefits at this point.  We need to readdress about his anticoagulation as he is unable to tolerate even the lowest dose of 2.5 mg daily.  If he needs to be treated for anticardiolipin antibody syndrome, he may need to be on Coumadin, but I think he has been seen in the past by Oncology/Hematology.    Requested [place] [facility] consultation as patient has history of anticardiolipin syndrome with history of PE and recurrent GI bleeding      History of esophagitis:  On Protonix as mentioned above.         Hyponatremia:  Again, I think it is most likely because of renal failure.  May improve with IV fluids for now.   Sodium is stable at 1 27-1   today  10.  Coronary artery disease:  Status post bypass surgery, stable at this time.   11.  History of diffuse large B-cell lymphoma:  Needs outpatient monitoring at this time.      CODE STATUS:  Full code.          DVT Prophylaxis: Pneumatic Compression Devices in the setting of GI bleed  Code Status: No CPR- Do NOT Intubate    Disposition: Expected discharge in 3 to 4 days once creatinine improves and heart rate is stable    Discussed with bedside RN patient and his wife and house provider who had evaluated him overnight, electrophysiology and nephrology today    Greater than 60 minutes were spent in  taking care of him today more than half of which was spent in collaboration of care and goals of care discussions      Cecelia Garcia MD  [phone] (P)      Interval History    Patient became agitated and pulled his temporary pacemaker overnight.  Had worsening respiratory issues needing BiPAP.  Creatinine remains high and patient remains oliguric.  Goals of care discussion was done with the patient and his wife at bedside and decided to transition him to comfort cares only today.  Comfort care orders initiated    -Data reviewed today: I reviewed all new labs and imaging results over the last 24 hours. I personally reviewed labs and imaging since last 24 hours     Physical Exam       BP: 112/65 Pulse: (!) 48   Resp: 11 SpO2: 93 % O2 Device: BiPAP/CPAP Oxygen Delivery: 15 LPM  Vitals:    04/02/21 0600 04/03/21 0540 04/04/21 0634   Weight: 102.4 kg (225 lb 12 oz) 97 kg (213 lb 14.4 oz) 102.2 kg (225 lb 3.2 oz)     Vital Signs with Ranges  Pulse:  [37-75] 48  Resp:  [8-31] 11  BP: ()/(38-84) 112/65  FiO2 (%):  [90 %-100 %] 90 %  SpO2:  [86 %-100 %] 93 %  I/O last 3 completed shifts:  In: 2583.08 [P.O.:760; I.V.:1523.08]  Out: 150 [Urine:150]    Constitutional: Awake, alert, cooperative, no apparent distress, pallor present.  appears very weak and tired  Respiratory: Clear to auscultation bilaterally, no crackles or wheezing  Cardiovascular: Regular rate and rhythm, normal S1 and S2, and 2+ murmur noted  GI: Normal bowel sounds, soft, moderately distended, non-tender  Skin/Integumen: No rashes, no cyanosis, no edema.  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  Other:     Medications     - MEDICATION INSTRUCTIONS -         folic acid  1 mg Oral Daily     midodrine  5 mg Oral TID w/meals     pantoprazole (PROTONIX) IV  40 mg Intravenous BID     sodium chloride (PF)  3 mL Intracatheter Q8H       Data   Recent Labs   Lab 04/04/21  0315 04/03/21  1817 04/03/21  0551 04/02/21  2139 04/02/21  0648 04/02/21  0648  04/01/21  1206 04/01/21  1206   WBC 6.5  --  4.5  --   --  5.4  --  4.9   HGB 8.0* 8.3* 8.2* 8.5*   < > 9.4*   < > 9.3*   *  --  100  --   --  98  --  101*   *  --  94*  --   --  162  --  158   INR 1.97*  --   --   --   --   --   --  1.56*   *  --  129*  --   --  127*   < > 127*   POTASSIUM 4.5  --  5.1 5.4*   < > 6.0*   < > 6.2*   CHLORIDE 94  --  95  --   --  97   < > 97   CO2 28  --  27  --   --  21   < > 20   BUN 80*  --  81*  --   --  83*   < > 83*   CR 6.14*  --  6.17*  --   --  6.29*   < > 6.83*   ANIONGAP 8  --  7  --   --  9   < > 10   BENNIE 7.8*  --  8.2*  --   --  8.5   < > 8.8   GLC 86  --  144*  --   --  29*   < > 110*   ALBUMIN 4.6  --   --   --   --  3.2*  --  3.3*   PROTTOTAL 7.1  --   --   --   --  6.9  --  7.3   BILITOTAL 1.0  --   --   --   --  1.0  --  0.6   ALKPHOS 64  --   --   --   --  89  --  93   ALT 15  --   --   --   --  18  --  20   AST 23  --   --   --   --  32  --  32   LIPASE  --   --   --   --   --   --   --  1,294*    < > = values in this interval not displayed.       Recent Results (from the past 24 hour(s))   XR Chest Port 1 View    Narrative    EXAM: XR CHEST PORT 1 VW  LOCATION: [facility]  DATE/TIME: 4/4/2021 3:04 AM    INDICATION: Loss of pacemaker capture  COMPARISON: 03/16/2021      Impression    IMPRESSION: Cardiac silhouette is enlarged. Status post sternotomy. Right internal jugular transvenous pacer lead in place, distal tip obscured by overlying monitoring leads. Developing central interstitial infiltrates. Small pleural effusions. No   visible pneumothorax.   Cardiac Catheterization    Narrative    1.  Successful repositioning of the temporary pacemaker lead from the   right atrium into the right ventricle.  2.  Discussed with primary team that capture is relatively poor, with a   capture threshold of around 4 to 5 mA.  However, as his heart rates on   dopamine are stable in the 60s without pacing, the temporary pacemaker   does not  appear necessary at present.  We agreed that for now we will hold   off on placing a femoral temporary pacemaker, which would be more stable   and likely have better capture, but would also increase risk of infection.

## 2021-04-04 NOTE — PLAN OF CARE
Pt A&Ox4 at beginning of shift, sating well on RA. 100% Vpaced, denied pain but complained of some mild R shoulder pain w/ moment, declined any turn reposition.   At 0315 pt HR noted to be in 30s Pacer did not appear to be capturing, pt confused unable to follow commands, appeared dusky/pale, BP low and, O2 sats low, pt attempting to get out of bed, stating he needed to get back into his bed. Temp PM appeared intact, dressing reinforced. RRT called. Pt mentation improved w/ Bipap and dobutamine started. Pt still disorientated to time and situation. Levin w/ low dalia colored urine output throughout shift. No BM and no rectal bleeding noted.

## 2021-04-04 NOTE — PROGRESS NOTES
Hematology oncology chart check    Date of Admission:  4/1/2021        Assessment & Plan     Antonio Ramirez is a 76 year old male who was admitted on 4/1/2021. I was asked to see the patient for history of antiphospholipid syndrome, management of anticoagulation.     1.  Significant events overnight, patient became agitated and pulled out his temporary pacemaker. Had worsening pneumonia hypoxia and respiratory failure needing BiPAP.  Cath Lab was activated and temporary pacemaker wire tried but was not capturing.   Events reviewed by patient's primary care team as well electrophysiology nephrology.  We will estimated that patient's prognosis is poor due to multiorgan failure and severe aortic stenosis.  Goals of cares were discussed with patient and his bed wife.  Following discussion patient was transitioned to comfort care measures only.     2.  History of antiphospholipid syndrome with pulmonary embolism.  -Patient is off anticoagulation due to history of recurrent GI bleeding.  -In view of recent events would recommend to continue to hold anticoagulation.   -No further recommendations from hematology oncology.   -Please call with any questions      Yonatan Ovalle MD   [facility]  Office:  [phone]

## 2021-04-04 NOTE — PROGRESS NOTES
Renal Medicine Progress Note                                Antonio Ramirez MRN# [id number]   Age: 76 year old YOB: 1945   Date of Admission: 4/1/2021 Hospital LOS: 3                  Assessment/Plan:     76-year-old male who presented to the emergency room, dated 04/01/2001 in the setting of rectal bleeding.     Evaluated with respect acute kidney injury and metabolic abnormalities.      1.  CKD 3a   -baseline creatinine 1.5 mg/dl range   -eGFR 60 ml/min range   -secondary hyperparathyroidism   2.  Previous multiple episodes of ARF   -many without full renal recovery   -abnormal creatinines date to at least 2014  3.  Dipstick proteinuria   - mg/gram  4.  ARF   -oliguric   -no obstruction by US   -Edmundo < 5   -pre renal v HRS  5.  Hyperkalemia  6.  Metabolic acidosis  7.  Hypernatremia  8.  Alcoholic cirrhosis   -portal HTN  9.  Bradycardia   -temporary pacer in place      Limited response to 25% albumin  In this setting (ARF with Edmundo < 5) suggests HRS    Add midodrine    Would not offer dialysis  HRS (not a liver tx candidate) on dialysis exceedingly high  mortality at 6 months     Comfort care measures appropriate     Interval History:     Oliguric  Creatinine little change   Now on BiPAP    Limited UO     Little patient interaction     ROS:     GENERAL: NAD, No fever,chills  R: NEGATIVE for significant cough or SOB  CV: NEGATIVE for chest pain, palpitations  EXT: no change edema  ROS otherwise negative    Medications and Allergies:     Reviewed    Physical Exam:     Vitals were reviewed  Patient Vitals for the past 8 hrs:   BP Pulse Resp SpO2 Weight   04/04/21 0700 114/68 52 25 97 % --   04/04/21 0634 -- -- -- -- 102.2 kg (225 lb 3.2 oz)   04/04/21 0630 101/69 51 (!) 31 (!) 89 % --   04/04/21 0600 100/63 52 19 96 % --   04/04/21 0530 130/84 52 10 100 % --   04/04/21 0520 115/62 56 -- -- --   04/04/21 0434 106/60 56 -- 96 % --   04/04/21 0415 106/60 75 -- 96 % --   04/04/21 0400 112/72 64 8 96  % --   04/04/21 0357 -- -- -- 98 % --   04/04/21 0356 -- -- -- 93 % --   04/04/21 0354 -- 64 17 (!) 88 % --   04/04/21 0345 -- -- -- (!) 88 % --   04/04/21 0337 -- -- -- (!) 86 % --   04/04/21 0335 -- -- -- (!) 89 % --   04/04/21 0334 -- -- -- (!) 88 % --   04/04/21 0332 -- -- -- (!) 88 % --   04/04/21 0330 100/63 66 22 -- --   04/04/21 0327 -- -- -- 90 % --   04/04/21 0325 (!) 89/60 67 12 91 % --   04/04/21 0320 97/54 65 -- 92 % --   04/04/21 0315 (!) 80/55 58 -- 90 % --   04/04/21 0310 (!) 74/52 57 -- -- --   04/04/21 0309 -- -- -- (!) 88 % --   04/04/21 0308 -- -- -- 91 % --   04/04/21 0305 -- -- -- (!) 88 % --   04/04/21 0300 116/73 60 18 97 % --   04/04/21 0257 -- -- -- 92 % --   04/04/21 0255 105/67 61 -- -- --   04/04/21 0252 101/49 63 19 90 % --   04/04/21 0250 94/60 (!) 37 12 -- --   04/04/21 0245 96/55 (!) 43 22 -- --   04/04/21 0240 (!) 50/38 (!) 42 24 -- --     I/O last 3 completed shifts:  In: 2583.08 [P.O.:760; I.V.:1523.08]  Out: 150 [Urine:150]    Vitals:    04/01/21 1156 04/02/21 0600 04/03/21 0540 04/04/21 0634   Weight: 90.7 kg (200 lb) 102.4 kg (225 lb 12 oz) 97 kg (213 lb 14.4 oz) 102.2 kg (225 lb 3.2 oz)         GENERAL: awake, alert, follows  HEENT: NC/AT, PERRLA, EOMI, non icteric, pharynx moist without lesion  ABDOMEN: soft  MS: no clubbing, cyanosis   SKIN: clear without significant rashes or lesions  NEURO: speech normal and cranial nerves 2-12 intact  PSYCH: affect flat  EXT: warm, no edema    Data:     Recent Labs   Lab 04/04/21  0315 04/03/21  0551 04/02/21  2139 04/02/21  1532 04/02/21  0648 04/01/21  1817   * 129*  --   --  127* 129*   POTASSIUM 4.5 5.1 5.4* 6.1* 6.0* 5.8*   CHLORIDE 94 95  --   --  97 98   CO2 28 27  --   --  21 22   ANIONGAP 8 7  --   --  9 9   GLC 86 144*  --   --  29* 59*   BUN 80* 81*  --   --  83* 78*   CR 6.14* 6.17*  --   --  6.29* 6.65*   GFRESTIMATED 8* 8*  --   --  8* 7*   GFRESTBLACK 9* 9*  --   --  9* 8*   BENNIE 7.8* 8.2*  --   --  8.5 8.7          Recent Labs   Lab Test 04/04/21  0315 04/03/21  0551 04/02/21  0648 04/01/21  1817 04/01/21  1206 03/17/21  0655 03/16/21  2258 04/26/20  0832 04/25/20  0857 04/24/20  0842   CR 6.14* 6.17* 6.29* 6.65* 6.83* 1.20 1.09 1.64* 1.67* 1.68*     Recent Labs   Lab 04/04/21  0315 04/03/21  0551 04/02/21  2139 04/02/21  1532 04/02/21  0648   POTASSIUM 4.5 5.1 5.4* 6.1* 6.0*     Recent Labs   Lab 04/04/21  0315 04/02/21  0648 04/01/21  1206   ALBUMIN 4.6 3.2* 3.3*     Recent Labs   Lab 04/04/21  0315 04/03/21 1817 04/03/21  0551 04/02/21  2139   PHOS 4.6*  --  4.7*  --    HGB 8.0* 8.3* 8.2* 8.5*       Recent Labs   Lab 04/03/21  0551   PTHI 345*     Recent Labs   Lab 04/02/21  1211   UNAR <5     Recent Labs   Lab 04/03/21  0551   IRON 12*   IRONSAT 5*   *   DANIELLE 84     Recent Labs   Lab 04/02/21  1211   UMALCR 100.00*       G Richard Lee MD    [facility] - Nephrology  [phone]

## 2021-04-04 NOTE — PROGRESS NOTES
Cardiology progress note:     Called regarding loss of capture overight following an episode of agitation. Underlying heart rates in the the 30-40s with junctional rhythm. Improved with atropine and started on dopamine peripherally. Heart rates are now improved to the 50s junctional rhythm, blood pressure has improved as well. However, they are noting increased oxygen need now requiring bipap      1. Severe aortic stenosis   2. LVEF 45%  3. Atrial fibrillation, complete heart block (narrow complex escape)  4. Pulmonary hypertension    5. Moderate MR  6. Moderate TR  7. Acute renal failure   8. Rectal bleeding     I discussed with hospital medicine the above issues. The CXR reveals the pacer is in the right atrium with failure to capture at 20 mA. His blood pressure and heart rate have stabilized on dopamine. However, he now has increased oxygen needs and the need for Bipap.  I advised that in the setting of renal failure and the CXR findings, this is consistent with pulmonary edema/hypervolemia. Alteratively aspiration given his altered mentation is also a concern. He is not yet on dialysis, but reportedly family would pursue those cares if necessary(team discussed with family tonight). He has not responded to diuretics.      Following the above discussion, we agree to proceed with temporary pacemaker repositioning. Cath lab activated, case briefly dicussed with Dr. Wang.      No clear change in his electrolytes or CBC. With increasing oxygen needs, he may be heading towards intubation and requiring dialysis. Cardiology will follow and provide recommendations as necessary.

## 2021-04-04 NOTE — PRE-PROCEDURE
GENERAL PRE-PROCEDURE:     Written consent obtained?: No    Emergent situation    Consent given by:  Patient  Expected level of sedation:  Moderate  Appropriately NPO:  Not NPO, but emergent condition outweighs risk  ASA Class:  Class 5- Severe systemic disease with imminent risk of death  Mallampati  :  Grade 3- soft palate visible, posterior pharyngeal wall not visible  Lungs:  Crackles left base and crackles right base  Heart:  Systolic murmur  History & Physical reviewed:  History and physical reviewed and no updates needed  Statement of review:  I have reviewed the lab findings, diagnostic data, medications, and the plan for sedation

## 2021-04-04 NOTE — PROGRESS NOTES
House NP note    Developed on patient's agitation post RRT overnight.    Is a 76-year-old man with PMH Hx severe aortic stenosis, ischemic cardiomyopathy with LVEF 45%, CAD, A. fib, moderate MR and TR, CKD 3, antiphospholipid antibody syndrome with history of PE, alcoholism, hypertension, hyperlipidemia, GERD, B-cell lymphoma who was admitted on 4/1/2021 with GI bleeding. Rapid response overnight for loss of capture from transvenous pacer was repositioned. Additionally dopamine was started currently at 5 mcg to maintain heart rate to the 40s to 50s blood pressures systolic 100-1 teens. Additionally he had new onset acute hypoxic respiratory failure requiring initiation of NIPPV 14/7, 1.0 FiO2.    I was paged this morning because of agitation.    Constitutional: vs. 53, SPO2 99% on above NIPPV settings with tidal volumes in the 500s, respiratory 17, SPO2 99%  General:  adult pt lying in bed without acute distress, mitts on hands  Neuro: +follows commands to stick out tongue and wiggle right foot, left foot is surgically absent, speech fluent  Eyes defer   head, ENT & mouth: NC/AT, dry oral mucosa  Neck: supple, introducer catheter in right IJ  CV S1S2, bradycardic, 3/6 systolic murmur loudest at the apex/left axilla  resp: Difficult to hear lung sounds over BiPAP and murmur, respiratory rate as above  gi: Hypo-oactive bowel sounds, nontender, disteded, tympany on percussion in bilateral upper quadrants, softer in the flanks but rather firm closer to umbilicus  Ext: no edema or mottling  Skin: no rashes on exposed skin, chronic venous stasis changes of right lower extremity,  Lymph: Defer  Musculoskeletal no bony joint deformities, surgically absent left foot    Agitation likely multifactorial etiology including possible hyeractive delirium secondary to hypoxic ARF, ANSELMO/CKD, possible alcohol withdrawal and/or decreased cerebral perfusion in the setting of recent loss of pacer capture and multiorgan failure including  respiratory failure and ANSELMO/CKD.  -Continue CIWA monitoring has been scoring 0 since admit  -Would like to try a test dose of benzodiazepines although he is not tachycardic, tachypneic, hypertensive or diaphoretic and does not appear to be hallucinating although the timeframe is right for alcohol withdrawal.  Risk of medication side effects potentially outweighs benefits at this point so we will hold off  -Add on LFTs and PT/INR s/p vit K  -Check glucose 163 mg/dL  -Stat ABG  -Check abdominal ultrasound to look for ascites as his abdomen is quite tense may be contributing to intra-abdominal hypertension contributing in part to ANSELMO  -If ABG permits/no significant hypercarbia can try high flow nasal cannula which may be a bit better tolerated and NIPPV  -He is not had any Lasix and in fact has been getting albumin up until his respiratory failure. Would coordinate with nephrology whether or not a trial of Lasix will be warranted prior to initiating hemodialysis if that consultant even feels renal replacement therapy is appropriate to offer    Total time 20 minutes from [id number]    Shirley Arvizu CNP  Hospitalist - [facility]  [phone]     Text Page

## 2021-04-04 NOTE — CODE/RAPID RESPONSE
"[facility]    RRT Note  4/4/2021   Time Called: 0241    RRT called for: bradycardia    Assessment & Plan     Severe Symptomatic Bradycardia, Junctional Rhythm with Loss of Pacemaker Capture  Acute Hypoxic Respiratory Failure suspect secondary to pulmonary edema in the setting of severe aortic stenosis and ARF  Patient was found with one leg out of bed and HR dropped to the low 30s with loss of pacemaker capture. BP decreased to 50s systolic, pale, and confused. Patient became increasing more agitated with the hemodynamic instability. He was frustrated and kept stating \"I'm falling, put me in bed.\" MA increased to 20 without capture. Atoprine 0.5mg administered with improvement in HR to upper 50's and SBP improved to 80-90. EKG shows junctional rhythm. Dopamine initiated with improvement in hemodynamics however patient's oxygenation worsening. O2 needs have increased from RA to 12L oxymask with O2 saturations maintaining 83-90%. Discussed case with on call cardiologist due to pacemaker dislodgement. I suspect loss of pacemaker capture with the patient's known severe aortic stenosis in addition to oliguric ARF is leading to worsening of pulmonary edema causing acute respiratory failure.  Goals of care addressed with patient who reiterates full code status. He will not answer if he is interested in pursuing dialysis at this point, he states \"It is 4 in the morning. I am not discussing this now. I will let you know in the morning\". I reached out to the patient's wife Helena but she did not answer the phone, message left to call the hospital. I was able to reach the patient's daughter, María, and she does state her dad would want all medical interventions. She informs me her father became upset when cares were withdrawn during a different hospitalization and he wants to live. She believes her father would be interested in dialysis if needed.   Cardiology informed, plan to activate cath " lab.    INTERVENTIONS:  -Atropine 0.5mg IV x1 now  -Dopamine 2-10mcg/kg/min to maintain MAP >65  -CXR STAT  -BMP, CBC, Mg, Phos, VBG STAT  -NS 250ml bolus now (stopped after ~100ml due to profound vascular congestion and pleural effusions on CXR)  -Bipap now 14/7  -Pacer pads applied  -Cath lab for temporary pacemaker adjustment  -Discontinue albumin  -IVF TKO with dopamine  -[facility] orders    At the end of the RRT to cath lab for pacemaker adjustment, hemodynamics stable on dopamine at 5mcg/kg/min, and oxygenation improved on bipap. Plan for patient to remain [facility]MC status with temporary pacemaker post cath lab.    Unfortunately, return of capture with temporary pacemaker was unsuccessful in cath lab. He does remain hemodynamically stable on dopamine thus, after discussion with Dr. Wang the decision was made to defer option for a femoral access temporary pacemaker at this time, if patient becomes hemodynamically unstable plan to pursue.    Discussed with and defer further cares to cardiology, bedside nursing staff, and flying squad.    Interval History     Antonio Ramirez is a 76 year old male who was admitted on 4/1/2021 for complaint of generalized weakness, fatigue, tiredness and bleeding per rectum for the last 3-4 days.    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)     ?When diagnosed, patient not aware of this     Monoclonal  gammopathy      Neuropathy      Noninfectious ileitis     diverticulitis of colon     Paroxysmal atrial fibrillation (H)      PE (pulmonary embolism) 2006    saddle emboli 2006     Prostate cancer (H) 2000    Treated with radiation (seed implants in 2000) -- no problems since     Pulmonary hypertension (H)      Renal disease     kidney stones     Syncope      Thrombocytopenia (H)      Urethral stricture      Valvular heart disease     moderate aortic stenosis, mild mitral stenosis     Venous thrombosis     IVC filter and lysis procedures 2007     Past Surgical History:   Procedure Laterality Date     AMPUTATE LEG BELOW KNEE Left 04/2014    related to gangrene, started as foot ulcer related to neuropathy     AMPUTATE LEG BELOW KNEE Left 12/4/2017    Procedure: AMPUTATE LEG BELOW KNEE;  Exploration of Left Leg Below Knee Amputation Stump with Ligation of Bleeders.;  Surgeon: Leandro Arreola MD;  Location:  OR     APPENDECTOMY       APPLY WOUND VAC Left 12/6/2017    Procedure: APPLY WOUND VAC;;  Surgeon: Saima Howard MD;  Location:  SD     ARTHROPLASTY HIP Right 11/27/2018    Procedure: RIGHT TOTAL HIP ARTHROPLASTY;  Surgeon: Saima Howard MD;  Location:  OR     ARTHROPLASTY KNEE Right 9/19/2014    Procedure: ARTHROPLASTY KNEE;  Surgeon: Saima Howard MD;  Location:  OR     CARDIAC SURGERY      CABG     CHOLECYSTECTOMY       COLONOSCOPY       COLONOSCOPY N/A 4/1/2020    Procedure: COLONOSCOPY;  Surgeon: Emeka Freeman DO;  Location:  GI     COMBINED CYSTOSCOPY, RETROGRADES, EXCHANGE STENT URETER(S) Left 7/24/2018    Procedure: COMBINED CYSTOSCOPY, RETROGRADES, EXCHANGE STENT URETER(S);  CYSTOSCOPY, BILATERAL RETROGRADES, BILATERAL URETERAL STENT EXCHANGE ;  Surgeon: Matt Cervantes MD;  Location:  OR     CRANIOTOMY Right 4/7/2018    Procedure: CRANIOTOMY;  Right Craniotomy For Subdural Hematoma;  Surgeon: Jono Issa MD;  Location:  OR     CYSTOSCOPY,  DILATE URETHRA, COMBINED N/A 10/12/2016    Procedure: COMBINED CYSTOSCOPY, DILATE URETHRA;  Surgeon: Dimitry Bello MD;  Location:  OR     CYSTOSCOPY, REMOVE STENT(S), COMBINED Right 7/24/2018    Procedure: COMBINED CYSTOSCOPY, REMOVE STENT(S);;  Surgeon: Jose Hunter MD;  Location:  OR     ENDOSCOPIC STRIPPING VEIN(S)       ENTEROSCOPY SMALL BOWEL N/A 4/8/2020    Procedure: ENTEROSCOPY;  Surgeon: Katherin Boyd MD;  Location:  OR     esophagogastroduodenoscopy       ESOPHAGOSCOPY, GASTROSCOPY, DUODENOSCOPY (EGD), COMBINED N/A 3/11/2019    Procedure: COMBINED ESOPHAGOSCOPY, GASTROSCOPY, DUODENOSCOPY (EGD), BIOPSY SINGLE OR MULTIPLE;  Surgeon: Katherin Boyd MD;  Location:  GI     ESOPHAGOSCOPY, GASTROSCOPY, DUODENOSCOPY (EGD), COMBINED N/A 9/27/2019    Procedure: ESOPHAGOGASTRODUODENOSCOPY, WITH FOREIGN BODY REMOVAL;  Surgeon: Raegan Mckeon MD;  Location:  GI     ESOPHAGOSCOPY, GASTROSCOPY, DUODENOSCOPY (EGD), COMBINED N/A 4/1/2020    Procedure: ESOPHAGOGASTRODUODENOSCOPY (EGD);  Surgeon: Emeka Freeman DO;  Location:  GI     ESOPHAGOSCOPY, GASTROSCOPY, DUODENOSCOPY (EGD), COMBINED N/A 4/9/2020    Procedure: Esophagoscopy, gastroscopy, duodenoscopy (EGD), combined;  Surgeon: Katherin Boyd MD;  Location:  GI     ESOPHAGOSCOPY, GASTROSCOPY, DUODENOSCOPY (EGD), COMBINED N/A 3/17/2021    Procedure: ESOPHAGOGASTRODUODENOSCOPY (EGD);  Surgeon: Rahul Nagy MD;  Location:  GI     EYE SURGERY      cataracts     GENITOURINARY SURGERY      Prostate Seen Implants     HERNIA REPAIR      Umbilical     INCISION AND DRAINAGE LOWER EXTREMITY, COMBINED Left 12/1/2017    Procedure: COMBINED INCISION AND DRAINAGE LOWER EXTREMITY;  INCISION AND DRAINAGE OF LEFT BELOW KNEE AMPUTATION ABSCESS, WOUND VAC PLACEMENT;  Surgeon: Saima Howard MD;  Location:  OR     IR IVC FILTER PLACEMENT       IR VISCERAL ANGIOGRAM  4/14/2020     IRRIGATION AND DEBRIDEMENT  "LOWER EXTREMITY, COMBINED Left 12/6/2017    Procedure: COMBINED IRRIGATION AND DEBRIDEMENT LOWER EXTREMITY;  IRRIGATION AND DEBRIDEMENT OF LEFT BELOW KNEE AMPUTATION SITE WITH WOUND VAC REPLACEMENT;  Surgeon: Saima Howard MD;  Location: [facility]     IRRIGATION AND DEBRIDEMENT LOWER EXTREMITY, COMBINED Left 12/8/2017    Procedure: COMBINED IRRIGATION AND DEBRIDEMENT LOWER EXTREMITY;  IRRIGATION AND DEBRIDEMENT OF LEFT BELOW THE KNEE AMPUTATION AND SHORTENING OF THE TIBIA WITH WOUND CLOSURE;  Surgeon: Saima Howard MD;  Location:  OR     LASER HOLMIUM LITHOTRIPSY URETER(S), INSERT STENT, COMBINED Bilateral 6/28/2018    Procedure: COMBINED CYSTOSCOPY, URETEROSCOPY, LASER HOLMIUM LITHOTRIPSY URETER(S), INSERT STENT;  CYSTOSCOPY, BILATERAL URETEROSCOPY,  HOLMIUM LASER LITHOTRIPSY, BILATERAL STENT PLACEMENT, STONE BASKETING;  Surgeon: Jose Hunter MD;  Location:  OR     LASER HOLMIUM LITHOTRIPSY URETER(S), INSERT STENT, COMBINED Left 8/14/2018    Procedure: COMBINED CYSTOSCOPY, URETEROSCOPY, LASER HOLMIUM LITHOTRIPSY URETER(S), INSERT STENT;  CYSTOSCOPY, LEFT URETEROSCOPY, LEFT LASER HOLMIUM LITHOTRIPSY URETER(S) REMOVAL BILATERAL STENTS;  Surgeon: Jose Hunter MD;  Location:  OR     ORTHOPEDIC SURGERY      (R) knee scope     ORTHOPEDIC SURGERY      total hip      ORTHOPEDIC SURGERY      elbow surgery       Code Status: Full Code    Allergies   Allergies   Allergen Reactions     Blood Transfusion Related (Informational Only) Other (See Comments)     Patient has a history of a clinically significant antibody against RBC antigens.  A delay in compatible RBCs may occur.     Kdc:Minocycline+[facility] GI Disturbance     11/01/16-PT IS NOT AWARE OF THIS REACTION     Ciprofloxacin Itching     Severe itching \"like ants were crawling\"     Hmg-Coa-R Inhibitors Other (See Comments)     Rhabdomyolysis occurred within a couple days  Rhabdomyolysis       Physical Exam   Vital Signs with " Ranges:  Temp:  [97.6  F (36.4  C)] 97.6  F (36.4  C)  Pulse:  [59-60] 60  Resp:  [8-18] 12  BP: ()/(65-73) 108/73  SpO2:  [90 %-99 %] 95 %  I/O last 3 completed shifts:  In: 2903.91 [P.O.:700; I.V.:1903.91]  Out: 450 [Urine:450]    Constitutional: alert, acutely agitated/frustrated, appears confused, mentation improved with hemodynamic improvement  ENT: mucus membranes moist, pupils 4-5mm (post atropine)  Neck: ROM intact, supple  Pulmonary: coarse crackles, diminished bases, no increased work of breathing noted  Cardiovascular: S1, S2, bradycardia, murmur present, subclavian temporary pacer site  GI: rounded, soft  Skin/Integumen: pale, slightly diaphoretic  Neuro: alert, disoriented and agitated initially which improvement with hemodynamic improvement to oriented x4, no focal deficts  Psych:  Easily escalated agitation  Extremities: trace peripheral edema    Data     EKG:  Interpreted by DIPAK Govea CNP  Time reviewed: 0300  Symptoms at time of EKG: hypotension, loss of pacemaker capture   Rhythm: junctional  Rate: 50-60  Axis: Right Axis Deviation  Ectopy: none  Conduction: junctional  ST Segments/ T Waves: No acute ischemic changes  Q Waves: none  Comparison to prior: junctional rhythm    Clinical Impression: junctional rhythm    ABG:  -No lab results found in last 7 days.    Troponin:    Recent Labs   Lab Test 06/11/19  1029   TROPI 0.433*       IMAGING: (X-ray/CT/MRI)   Recent Results (from the past 24 hour(s))   XR Chest Port 1 View    Narrative    EXAM: XR CHEST PORT 1 VW  LOCATION: [facility]  DATE/TIME: 4/4/2021 3:04 AM    INDICATION: Loss of pacemaker capture  COMPARISON: 03/16/2021      Impression    IMPRESSION: Cardiac silhouette is enlarged. Status post sternotomy. Right internal jugular transvenous pacer lead in place, distal tip obscured by overlying monitoring leads. Developing central interstitial infiltrates. Small pleural effusions. No   visible pneumothorax.        CBC with Diff:  Recent Labs   Lab Test 04/03/21  1817 04/03/21  0551 04/01/21  1206 04/01/21  1206   WBC  --  4.5   < > 4.9   HGB 8.3* 8.2*   < > 9.3*   MCV  --  100   < > 101*   PLT  --  94*   < > 158   INR  --   --   --  1.56*    < > = values in this interval not displayed.        Lactic Acid:    Lab Results   Component Value Date    LACT 1.0 04/16/2020           Comprehensive Metabolic Panel:  Recent Labs   Lab 04/03/21  0551 04/02/21  0648 04/02/21  0648   *  --  127*   POTASSIUM 5.1   < > 6.0*   CHLORIDE 95  --  97   CO2 27  --  21   ANIONGAP 7  --  9   *  --  29*   BUN 81*  --  83*   CR 6.17*  --  6.29*   GFRESTIMATED 8*  --  8*   GFRESTBLACK 9*  --  9*   BENNIE 8.2*  --  8.5   PHOS 4.7*  --   --    PROTTOTAL  --   --  6.9   ALBUMIN  --   --  3.2*   BILITOTAL  --   --  1.0   ALKPHOS  --   --  89   AST  --   --  32   ALT  --   --  18    < > = values in this interval not displayed.       INR:    Recent Labs   Lab Test 04/01/21  1206   INR 1.56*       D-DIMER:  No results found for: DIMER    BNP:  No results found for: BNP    UA:  Recent Labs   Lab 04/02/21  1211   COLOR Yellow   APPEARANCE Clear   URINEGLC Negative   URINEBILI Negative   URINEKETONE Negative   SG 1.018   UBLD Trace*   URINEPH 5.5   PROTEIN 30*   NITRITE Negative   LEUKEST Negative   RBCU 138*   WBCU 1       Time Spent on this Encounter   I spent 100 minutes of critical care time on the unit/floor managing the care of Antonio Ramirez. Upon evaluation, this patient had a high probability of imminent or life-threatening deterioration due to severe bradycardia with hemodynamic instability and acute hypoxia, which required my direct attention, intervention, and personal management. 100% of my time was spent at the bedside counseling the patient and/or coordinating care regarding services listed in this note.    Emely Hunt, APRN CNP

## 2021-04-04 NOTE — DISCHARGE SUMMARY
[facility]  Discharge Summary        Antonio Ramirez MRN# [id number]   YOB: 1945 Age: 76 year old     Date of Admission:  2021  Date of Discharge:  2021  Admitting Physician:  Denis Dong MD  Discharge Physician: Cecelia Garcia MD  Discharging Service: Hospitalist     Primary Provider: Main Hardy  Primary Care Physician Phone Number: [phone]         Discharge Diagnoses/Problem Oriented Hospital Course (Providers):    Antonio Ramirez was admitted on 2021 by Denis Dong MD and I would refer you to their history and physical.  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  He now comes back to the ER with complaint of generalized weakness, fatigue, tiredness and bleeding per rectum for the last 3-4 days.     Patient is  on 21        Patient became agitated overnight and pulled out his temporary pacemaker.  He also had a worsening hypoxia with respiratory failure needing BiPAP.  Cath Lab was activated and temporary pacemaker tried to be repositioned but has not been capturing well.  Patient's abdomen is more distended so IV fluids were stopped.  He was started on a dopamine drip to help with the heart rate.  Events reviewed with the EP team  and nephrology this morning.  His creatinine remains elevated at 6 and he is oliguric only made 150 cc urine output over the last 24 hours.  His prognosis is very poor with multiorgan failure with  severe aortic stenosis.  Goals of care discussion was done with the patient and his wife at bedside today.  Patient told the wife that he does not want all these measures we are doing to keep him alive.  Patient and wife decided to go with comfort care measures only to be kept comfortable.  Dopamine drip was discontinued.  Comfort measures initiated.  Social work consultation requested for hospice meeting and discharge disposition     Patient is transition to comfort care only on 4/4/2021  Spiritual and emotional support was given to wife     HE passed away comfortably at 11.20AM on 4/4/21. Cause of death GI Bleeding and complete heart block, ANSELMO secondary to ATN      ASSESSMENT AND PLAN:     Acute lower gastrointestinal bleeding:  This is a 76-year-old male with history of GI bleeding in the recent past who was admitted earlier in 03/2021.  He was on Eliquis.  He has an EGD on 03/16, which shows esophageal stenosis benign, esophagitis, portal gastropathy and normal duodenum.  Colonoscopy shows multiple angiodysplastic lesions with bleeding in cecum.  Multiple diverticula in sigmoid region and rectal varices.  Was not bleeding.  He was treated with cautery at that time.  He was asked to hold anticoagulation for about a week.  He did, and now he is back on anticoagulation and bleeding again.  Although his hemoglobin is stable, he slightly hypertensive at this time and bradycardic.    Started on IV fluids and IV Protonix twice daily and octreotide drip on admission   GI consultation with  requested  Hemoglobin stayed stable at 9.3 on admission to 9.4-8.2   Patient continues to mild rectal bleeding most likely from hemorrhoid.  And the drop in hemoglobin most likely dilutional as patient has been getting IV fluids since admission continuously with his renal failure  Started on renal diet today .  Discontinue the octreotide drip today  conservative management for now as per GI  Patient is transitioned  to  comfort care only    Severe hypoglycemia;  Most likely secondary to the insulin given to treat for the hyperkalemia in the setting of acute kidney injury contributing  Blood sugar was low at 28-29 on 4/2/2021  Blood sugars improved currently so D10 was discontinued  Comfort cares only     Atrial fibrillation with slow ventricular response with complete heart block with heart rate in the 20s;  Patient is status post temporary pacemaker placement  Patient was on low-dose nadolol 20 mg p.o. daily which has been on hold since admission  Heart rate running really low in the 20s today  EP consultation requested .  Temporary pacemaker placed on 4-2-21  Patient must have some intrinsic conduction system disease and might need permanent pacemaker as per EP   Comfort cares only          Acute acute kidney injury on CKD stage III most likely from ATN versus hepatorenal syndrome with history of cirrhosis:   Hyperkalemia   The patient had chronic kidney disease, stage III, with baseline creatinine in the 1.3 to 1.6 range.  It has now increased to 6.83.  He is not having significant vomiting or diarrhea.  He only uses occasionally ibuprofen.  He is slightly hypotensive, though.  The cause of this significant rise in creatinine is uncertain at this time.  This may be hepatorenal syndrome versus ATN as not eating and drinking well.  I will go ahead and check FENA and ultrasound of the kidneys to rule out any hydronephrosis given history of prostate cancer in the past,   Nephrology consulted  Getting IV fluids per nephrology  Renal ultrasound on admission showed no hydronephrosis mildly atrophic left kidney small ascites seen  Creatinine still is elevated at 6.17  Potassium improved from 6.2-5.1 today  Getting IV albumin for nephrology stop now  Comfort cares only      Hyperkalemia:    This is again secondary to acute renal failure, not making urine, almost anuric at this time.  Keep him on IV fluids.  He received active treatment  with IV calcium gluconate, IV insulin and dextrose.  We will put him on IV bicarbonate infusion drip at this time.   Given 30 g of Kayexalate p.o. 2 doses given on 4/2/2021  Potassium improved to 5.1 today  Follow potassium closely  Comfort cares only       Alcohol abuse and alcohol liver disease and possible acute pancreatitis:  His LFTs are normal, but he has continued to drink alcohol. His Lipase slightly elevated,   He seems like he has some ascites as well.  I counseled him about stopping alcohol.  We will need to keep him on alcohol withdrawal protocol.  We will check CIWA and give him lorazepam according to CIWA if needed.  No active signs of alcohol withdrawal currently       History of ischemic cardiomyopathy:   Echo done today showed EF of 45 to 50%.  2+ MR 2+ TR and severe valvular aortic stenosis with valve area of 0.8 cm    Does not look like florid exacerbation.  Will have to hold his nadolol due to bradycardia.    Monitor fluid status closely as he is at risk of fluid overload  Patient with a moderate abdominal distention most likely from ascites and he gets as needed paracentesis with ascites       Paroxysmal atrial fibrillation/anticardiolipin antibody syndrome, history of PE in the past:  As per record, he may have anticardiolipin antibody, but he is on Eliquis 2.5 mg daily only.  It is causing more complications then benefits at this point.  We need to readdress about his anticoagulation as he is unable to tolerate even the lowest dose of 2.5 mg daily.  If he needs to be treated for anticardiolipin antibody syndrome, he may need to be on Coumadin, but I think he has been seen in the past by Oncology/Hematology.    Requested [place] [facility] consultation as patient has history of anticardiolipin syndrome with history of PE and recurrent GI bleeding       History of esophagitis:  On Protonix as mentioned above.           Hyponatremia:  Again, I think it is most likely because of renal failure.  " May improve with IV fluids for now.   Sodium is stable at 1 27-1   today  10.  Coronary artery disease:  Status post bypass surgery, stable at this time.   11.  History of diffuse large B-cell lymphoma:  Needs outpatient monitoring at this time.                 DVT Prophylaxis: Pneumatic Compression Devices in the setting of GI bleed  Code Status: No CPR- Do NOT Intubate comfort  only      Disposition: discharged to  home      Discussed with bedside RN patient and his wife and house provider who had evaluated him overnight, electrophysiology and nephrology today           Code Status:      Comfort Care        Brief Hospital Stay Summary Sent Home With Patient in AVS:               Important Results:      See below         Pending Results:        Unresulted Labs Ordered in the Past 30 Days of this Admission     Date and Time Order Name Status Description    4/3/2021 1214 Lupus Anticoagulant Panel In process             Discharge Instructions and Follow-Up:            Discharge Disposition:      Discharged to home        Discharge Medications:        Current Discharge Medication List      None as patient is      Details                                                    Allergies:         Allergies   Allergen Reactions     Blood Transfusion Related (Informational Only) Other (See Comments)     Patient has a history of a clinically significant antibody against RBC antigens.  A delay in compatible RBCs may occur.     Kdc:Minocycline+[place] Blue Fcf GI Disturbance     16-PT IS NOT AWARE OF THIS REACTION     Ciprofloxacin Itching     Severe itching \"like ants were crawling\"     Hmg-Coa-R Inhibitors Other (See Comments)     Rhabdomyolysis occurred within a couple days  Rhabdomyolysis           Consultations This Hospital Stay:      Consultation during this admission received from cardiology, gastroenterology and nephrology               Discharge Time:      Greater than 30 minutes.        " Image Results From This Hospital Stay (For Non-EPIC Providers):        Results for orders placed or performed during the hospital encounter of 21   US Renal Complete    Narrative    US RENAL COMPLETE 2021 9:00 PM    CLINICAL HISTORY: ARF  TECHNIQUE: Routine Bilateral Renal and Bladder Ultrasound.    COMPARISON: CT 3/10/2021    FINDINGS:    RIGHT KIDNEY: 11.4 x 4.9 x 6.1 cm. Normal without hydronephrosis or  masses.     LEFT KIDNEY: 9.1 x 4.6 x 5.7 cm. Mildly atrophic. Evaluation is  limited by body habitus. The renal calculi seen on the prior CT could  not be visualized on ultrasound. No hydronephrosis.    BLADDER: Normal.    Small ascites.      Impression    IMPRESSION:  1.  No hydronephrosis in either kidney.  2.  The known left renal calculi were not visualized by ultrasound.  Mildly atrophic left kidney.  3.  Small ascites.    VENKATA JOHNSON MD   XR Chest Port 1 View    Narrative    EXAM: XR CHEST PORT 1 VW  LOCATION: [facility]  DATE/TIME: 2021 3:04 AM    INDICATION: Loss of pacemaker capture  COMPARISON: 2021      Impression    IMPRESSION: Cardiac silhouette is enlarged. Status post sternotomy. Right internal jugular transvenous pacer lead in place, distal tip obscured by overlying monitoring leads. Developing central interstitial infiltrates. Small pleural effusions. No   visible pneumothorax.   Echocardiogram Complete    Narrative    [id number]  [id number]  [id number]  [id number]^SAUL^KB^LANE     [facility]  Echocardiography Laboratory  [address] [place] [address]     Name: GALILEA LUGO  MRN: [id number]  : 1945  Study Date: 2021 09:04 AM  Age: 76 yrs  Gender: Male  Patient Location: [facility]  Reason For Study: Bradycardia - Sinus  Ordering Physician: KB HUGHES  Referring Physician: CHRIST SUAZO  Performed By: Teressa Griffiths     BSA: 2.3 m2  Height: 73 in  Weight: 225 lb  HR: 31  BP: 134/105  mmHg  ______________________________________________________________________________  Procedure  Complete Portable Echo Adult.  ______________________________________________________________________________  Interpretation Summary     There is mild concentric left ventricular hypertrophy.  The visual ejection fraction is estimated at 45-50%.  Flattened septum is consistent with RV pressure/volume overload.  There is severe apical wall hypokinesis.  The right ventricle is mild to moderately dilated.  The right ventricular systolic function is mild to moderately reduced.  There is mod-severe biatrial enlargement.  There is moderate (2+) mitral regurgitation.  There is moderate (2+) tricuspid regurgitation.  Dilation of the inferior vena cava is present with abnormal respiratory  variation in diameter.  Right ventricular systolic pressure is elevated, consistent with severe  pulmonary hypertension.  Severe valvular aortic stenosis, DIVINA 0.8 cm2. Significantly worse compared  with study from 2019.  ______________________________________________________________________________  Left Ventricle  The left ventricle is borderline dilated. There is mild concentric left  ventricular hypertrophy. Left ventricular diastolic function is abnormal. The  visual ejection fraction is estimated at 45-50%. Flattened septum is  consistent with RV pressure/volume overload. There is severe apical wall  hypokinesis.     Right Ventricle  The right ventricle is mild to moderately dilated. The right ventricular  systolic function is mild to moderately reduced.     Atria  There is mod-severe biatrial enlargement. The right atrium is severely  dilated.     Mitral Valve  There is moderate to severe mitral annular calcification. The mitral valve  leaflets appear thickened, but open well. There is moderate (2+) mitral  regurgitation.     Tricuspid Valve  Tricuspid leaflets are thickened. Right ventricular systolic pressure is  elevated, consistent  with severe pulmonary hypertension. There is moderate  (2+) tricuspid regurgitation.     Aortic Valve  Severe valvular aortic stenosis.     Pulmonic Valve  Normal pulmonic valve. There is trace pulmonic valvular regurgitation.     Vessels  Mild aortic root dilatation. The ascending aorta is Mildly dilated. Dilation  of the inferior vena cava is present with abnormal respiratory variation in  diameter.     Pericardium  There is no pericardial effusion.     Rhythm  The rhythm was undetermined.  ______________________________________________________________________________  MMode/2D Measurements & Calculations  IVSd: 1.2 cm     LVIDd: 5.4 cm  LVIDs: 3.3 cm  LVPWd: 1.1 cm  FS: 39.5 %  LV mass(C)d: 252.2 grams  LV mass(C)dI: 111.5 grams/m2  Ao root diam: 4.1 cm  LA dimension: 5.2 cm  asc Aorta Diam: 4.3 cm  LA/Ao: 1.3  LVOT diam: 2.4 cm  LVOT area: 4.7 cm2  LA Volume (BP): 168.0 ml  LA Volume Index (BP): 74.3 ml/m2  RWT: 0.42     Doppler Measurements & Calculations  MV E max canelo: 135.0 cm/sec  MV A max canelo: 55.7 cm/sec  MV E/A: 2.4  MV max P.3 mmHg  MV mean P.6 mmHg  MV V2 VTI: 60.7 cm  MVA(VTI): 1.7 cm2  MV P1/2t max canelo: 170.1 cm/sec  MV P1/2t: 54.0 msec  MVA(P1/2t): 4.1 cm2  MV dec slope: 922.6 cm/sec2  MV dec time: 0.21 sec  Ao V2 max: 374.6 cm/sec  Ao max P.1 mmHg  Ao V2 mean: 245.5 cm/sec  Ao mean P.0 mmHg  Ao V2 VTI: 116.4 cm  DIVINA(I,D): 0.89 cm2  DIVINA(V,D): 0.88 cm2  LV V1 max P.0 mmHg  LV V1 max: 70.4 cm/sec  LV V1 VTI: 22.2 cm  SV(LVOT): 104.0 ml  SI(LVOT): 46.0 ml/m2  PA V2 max: 101.8 cm/sec  PA max P.1 mmHg  PA acc time: 0.09 sec  TR max canelo: 401.8 cm/sec  TR max P.6 mmHg  AV Canelo Ratio (DI): 0.19  DIVINA Index (cm2/m2): 0.39  E/E' av.5  Lateral E/e': 25.8  Medial E/e': 35.3     ______________________________________________________________________________  Report approved by: Telma De Luna 2021 11:30 AM         Cardiac Catheterization    Narrative    1.  Successful RIKINGS  approach placement of temporary pacemaker.  No   complications.   Cardiac Catheterization    Narrative    1.  Successful repositioning of the temporary pacemaker lead from the   right atrium into the right ventricle.  2.  Discussed with primary team that capture is relatively poor, with a   capture threshold of around 4 to 5 mA.  However, as his heart rates on   dopamine are stable in the 60s without pacing, the temporary pacemaker   does not appear necessary at present.  We agreed that for now we will hold   off on placing a femoral temporary pacemaker, which would be more stable   and likely have better capture, but would also increase risk of infection.             Most Recent Lab Results In EPIC (For Non-EPIC Providers):    Most Recent 3 CBC's:  Recent Labs   Lab Test 04/04/21 0315 04/03/21  1817 04/03/21  0551 04/02/21  0648 04/02/21  0648   WBC 6.5  --  4.5  --  5.4   HGB 8.0* 8.3* 8.2*   < > 9.4*   *  --  100  --  98   *  --  94*  --  162    < > = values in this interval not displayed.      Most Recent 3 BMP's:  Recent Labs   Lab Test 04/04/21 0315 04/03/21  0551 04/02/21  2139 04/02/21  0648 04/02/21  0648   * 129*  --   --  127*   POTASSIUM 4.5 5.1 5.4*   < > 6.0*   CHLORIDE 94 95  --   --  97   CO2 28 27  --   --  21   BUN 80* 81*  --   --  83*   CR 6.14* 6.17*  --   --  6.29*   ANIONGAP 8 7  --   --  9   BENNIE 7.8* 8.2*  --   --  8.5   GLC 86 144*  --   --  29*    < > = values in this interval not displayed.     Most Recent 3 Troponin's:  Recent Labs   Lab Test 06/11/19  1029 06/11/19  0710 02/28/19  1625   TROPI 0.433* 0.027 0.030     Most Recent 3 INR's:  Recent Labs   Lab Test 04/04/21 0315 04/01/21  1206 12/07/20  1308   INR 1.97* 1.56* 1.54*     Most Recent 2 LFT's:  Recent Labs   Lab Test 04/04/21 0315 04/02/21  0648   AST 23 32   ALT 15 18   ALKPHOS 64 89   BILITOTAL 1.0 1.0     Most Recent Cholesterol Panel:No lab results found.  Most Recent 6 Bacteria Isolates From Any  Culture (See EPIC Reports for Culture Details):  Recent Labs   Lab Test 03/17/21  1300 03/03/21  1236 12/02/19  1350 06/11/19  0955 06/11/19  0303 06/11/19  0220   CULT No growth >100,000 colonies/mL  mixed urogenital aranza  Susceptibility testing not routinely done   No growth Moderate growth  Normal aranza   No growth Cultured on the 1st day of incubation:  Staphylococcus simulans  *  Critical Value/Significant Value, preliminary result only, called to and read back by   Zari Camacho RN. 6/11/19 @ 2120, JR    (Note)  POSITIVE for Staphylococci other than S.aureus, S.epidermidis and  S.lugdunensis, by [employer] multiplex nucleic acid test.  Coagulase-negative staphylococci are the most common venipuncture or  collection associated skin CONTAMINANTS grown in blood cultures.  Final identification and antimicrobial susceptibility testing will be  verified by standard methods.    Specimen tested with [employer] multiplex, gram-positive blood culture  nucleic acid test for the following targets: Staph aureus, Staph  epidermidis, Staph lugdunensis, other Staph species, Enterococcus  faecalis, Enterococcus faecium, Streptococcus species, S. agalactiae,  S. anginosus grp., S. pneumoniae, S. pyogenes, Listeria sp., mecA  (methicillin resistance) and Rad/B (vancomycin resistance).    Critical Value/Significant Value called to and read back by  Mariana Goddard RN on 06.11.2019 at 2352.  JRT    >100,000 colonies/mL  mixed urogenital aranza  Susceptibility testing not routinely done       Most Recent TSH, T4 and HgbA1c:   Recent Labs   Lab Test 10/31/18  1742 05/18/18  1641 05/18/18  1641   TSH 0.86   < >  --    A1C  --   --  4.6    < > = values in this interval not displayed.

## 2021-04-05 LAB — INTERPRETATION ECG - MUSE: NORMAL

## 2021-04-06 LAB — LA PPP-IMP: NEGATIVE

## 2021-05-10 ENCOUNTER — TELEPHONE (OUTPATIENT)
Dept: FAMILY MEDICINE | Facility: CLINIC | Age: 76
End: 2021-05-10

## 2021-05-10 NOTE — TELEPHONE ENCOUNTER
Pharmacy faxing - requesting refill of Eliquis    Patient is     Faxing pharmacy back - please stop all auto-requests for all medications    RT Mary (R)

## 2023-01-26 NOTE — PROGRESS NOTES
76 year-old man, presented for rectal bleeding, Hb 9.1.  Found to have hyperkalemia and received insulin, followed by hypoglycemia.  Had severe sinus bradycardia down to 29 bpm while awake. Received atropine. HR in the upper 30s to 40 bpm. BP 120s systolic. No apparent distress.   He was on nadolol 20 mg daily before the admission.  History of paroxysmal AF  Moderate aortic stenosis, mild mitral stenosis.  CAD with previous CABG, EF 40%.  Hypertension.    Urethral stricture  Chronic renal insuffiiciency, creatinine 6.29.  Pulmonary hypertension   Prostate cancer  DVT/PE  Noninfectious ileitis    Neuropathy  Monoclonal gammopathy  Low grade B cell lymphoproliferative disorder  Liver disease with normal AST and ALT.  Left foot drop  Hiatal hernia  GERD  Antiphospholipid antibody syndrome  Anticardiolipin antibody syndrome   Anemia  Amputated left leg  Alcoholism   Phlebitis and thrombophlebitis of superficial vessels of lower extremities   PVD    Bradycardia could be secondary to hyperkalemia and nadolol.  Close monitoring of heart rate for now.  Consider temporary pacing wire insertion if resting HR<30 bpm while awake.  Eliquis is contraindicated.   Infectious Disease Progress Note    Follow up for :Cavitary lung lesion, concern for fungal infection in an immunocompromised patient     S-patient is doing better today.  He is off oxygen.  No shortness of breath.  He has mild cough.  No fever chills or diarrhea    P/E    General-   Comfortable, no distress  Chest- scattered rhonchi bilaterally. No wheeze/crackle  CVS- S1&S2 normal. No murmur, gallop or rub  Abdomen - soft, non tender, no guarding or rigidity. BS normal  Ext- no edema, no rash  Neuro- AAOX3, no focal deficit. No cranial nerve deficit    Assessment:    Cavitary lung lesion, concern for fungal infection     -right upper lobe cavitary lesion with multiple air-fluid levels associated with extensive consolidation    -urine Legionella and strep pneumo antigens are negative    -nasal MRSA screen negative    -sputum culture pending    -COVID-19, influenza and RSV negative    -serum crypto antigen negative     Postobstructive pneumonia with bilateral pleural effusions, small loculation on the right    -stenting of left main bronchus on 2023 at [facility]     Metastatic Squamous cell lung CA with mediastinal involvement and extensive nodular and reticular opacities with likely lymphangitic spread of tumor    -extensive mass in the middle mediastinum communicating with right mainstem bronchus with associated mass effect on vascular structures noted on CT    -received radiation treatment last week    -not a candidate for chemotherapy at this time due to poor performance status    -has family history of lung CA, his mother  of similar lung cancer at age 54     Tumor swelling and progression post radiation treatment with impending airway compromise status post intubation, extubated on 2023     Dysphagia likely secondary to mass effect from lung tumor involving mediastinum    -speech therapy following     Hypercalcemia of malignancy     History of smoking     Severe protein calorie  malnutrition     Therapeutic drug monitoring        Antimicrobials  1/25-IV Isavuconazole  1/17 Cefepime  1/19 Flagyl  1/17-1/21 vancomycin  1/17-1/18 azithromycin     Plan:    Not clear if cavitary lesion is from progression of lung cancer or invasive infections.  Since patient has been treated with broad-spectrum antibiotics and CT showed progression, this is likely from tumor progression, however will cover with antifungal treatment empirically for Aspergillus specifically pending workup.  follow fungal serologies and fungal sputum culture.  Follow sputum culture obtained on 01/24/2023  Continue Isavuconazole(avoiding voriconazole due to drug-drug interaction with amiodarone)  Empirically pending fungal workup  Monitor creatinine and LFTs closely. Continue cefepime and Flagyl.  I do not think he needs MRSA coverage at this time since he had already received few days of vancomycin and negative MRSA screen  Will make further recommendations based on clinical progress and workup    Vitals with min/max:      Vital Last Value 24 Hour Range   Temperature 97.4 °F (36.3 °C) (01/26/23 0400) Temp  Min: 97 °F (36.1 °C)  Max: 97.4 °F (36.3 °C)   Pulse 84 (01/26/23 0600) Pulse  Min: 82  Max: 100   Respiratory (!) 21 (01/26/23 0600) Resp  Min: 16  Max: 25   Non-Invasive  Blood Pressure 116/89 (01/26/23 0600) BP  Min: 97/66  Max: 130/88   Pulse Oximetry 93 % (01/26/23 0600) SpO2  Min: 90 %  Max: 100 %   Arterial   Blood Pressure 117/70 (01/25/23 0930) No data recorded         WBC (K/mcL)   Date Value   01/26/2023 25.5 (H)   01/25/2023 22.8 (H)   01/24/2023 19.7 (H)     HGB (g/dL)   Date Value   01/26/2023 8.1 (L)   01/25/2023 7.6 (L)   01/24/2023 7.9 (L)    BUN (mg/dL)   Date Value   01/26/2023 21 (H)   01/25/2023 16   01/24/2023 22 (H)      PLT (K/mcL)   Date Value   01/26/2023 393   01/25/2023 375   01/24/2023 585 (H)    Potassium (mmol/L)   Date Value   01/26/2023 3.9   01/25/2023 4.0   01/25/2023 3.3 (L)    Creatinine  (mg/dL)   Date Value   01/26/2023 0.30 (L)   01/25/2023 0.22 (L)   01/24/2023 0.38 (L)          Sam Jackson MD,MPH,DTM&H

## 2023-04-25 NOTE — PLAN OF CARE
Problem: Goal/Outcome  Goal: Goal Outcome Summary  Outcome: No Change  FOCUS/GOAL  Bladder management    ASSESSMENT, INTERVENTIONS AND CONTINUING PLAN FOR GOAL:  Pt a&ox4. Denied pain. Slept between cares. Set off bed alarms x1 to use urinal at bedside. Needed min assist to use urinal. No concerns over night. Alarms on.          Stable and well-controlled on daily Zyrtec and as needed Astelin nasal spray, continue same, continue to avoid smoke and known irritants

## 2023-06-13 NOTE — DISCHARGE SUMMARY
Patient discharged to [facility] by wheelchair at 3:30 PM 04/10/19.  Medication regimen discussed with patient and patient verbalizes understanding.  Diet and activity and wound care discussed with patient.  Upcoming appointments reviewed.  No questions at this time. No swelling or hematoma at puncture site, also CDI. Denies pain     ED MD

## 2023-10-05 NOTE — H&P
Please let patient know Ozempic is only for people with diabetes.  I sent Wegovy which is the exact same drug only marketed for weight loss so dosing is a little different.    We will see if insurance approves.  It should be started as follows      Weeks 1-4: Inject 0.25 mg under the skin every 7 days  Weeks 5-8: Inject 0.5 mg under the skin every 7 days  Weeks 9-12: Inject 1.0 mg under the skin every 7 days      I have only sent the first dose.  If approved she should let me know once she starts so I can then send the next dose and to also schedule follow up with me for reevaluation after having been on the medicine for 6 weeks.   [facility]    History and Physical  Hospitalist       Date of Admission:  11/29/2017    Assessment & Plan   Antonio Ramirez is a 72 year old male who presents with left BKA pain and swelling and has:    Summary:    Principal Problem:    Abscess of Left BKA  Active Problems:    Alcoholic cirrhosis of liver (H)    Chronic kidney disease, stage III (moderate)    CAD, MI in 2007 -- S/P CABG x 3 in 2007    Prostate cancer -- S/P Radiative Seed implants in 2000 (no problems since)    Antiphospholipid Jina Syn, Hypercoag (DVT, PE) -- has IVC filt and Warfarin    Thoracic aortic aneurysm without rupture (H)    Chronic congestive heart failure, unspecified congestive heart failure type (H)    Paroxysmal a-fib (H)    Cellulitis and abscess of leg    Thrombocytopenia -- suspect related to alcohol use     Plan -- IV antibiotics, Ortho consult to consider I and D (probably in OR tomorrow).  Will give Vit K 5 mg tonight, then check INR in AM and give next Aspirin dose on Friday AM.  Discussed advanced directives, desires Full Code.  Discussed alcohol use, he has been told to stop it in the past.  I suspect his idiopathic neuropathy is probably related to alcohol use, and his low platelets probably also related to that.  Will check AST in AM, but suggested avoiding alcohol all together (he uses it to help relax).  Ativan PRN ordered while here.  Will check sodium in AM.  Will check EKG, but medically stable for I and D -- pending INR (bleeding might be limited if minor surgery, and can use tourniquet).      DVT Prophylaxis: Warfarin and Pneumatic Compression Devices  Code Status: Full Code    Disposition: Expected discharge in 3 days once abscess drained and definitive antibiotic therapy decided.    Davie Osborn MD    Primary Care Physician   [facility]    Chief Complaint   Left BKA pain and swelling    History is obtained from the patient    History of Present Illness   73 yo white male with CAD,  S/P CABG in 2007 and no cardiac problems since then, CRF stage 3, 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  2 weeks ago in [place] saw his prosthetic yulisa who thought he might benefit from a tighter prosthesis rubber sleeve, and then 3-4 days ago he noted pain and swelling, progressively worse, temp up to 102, chills, and presented to ER today and ultrasound shows a fluid collection around the left BKA stump max 6.6 cm in size.  He is on warfarin with INR 2.53.      Past Medical History    I have reviewed this patient's medical history and updated it with pertinent information if needed.   Past Medical History:   Diagnosis Date     Alcoholism (H)      Amputated left leg (H)      Anemia      Anticardiolipin antibody syndrome (H)      Antiplatelet or antithrombotic long-term use      Aortic stenosis      Arthritis      Balance problem      Cardiomyopathy (H)      Coagulation disorder (H)     cardiolipinantibody syndrome     Coronary artery disease      Gastro-oesophageal reflux disease      Heart attack 2007     Hiatal hernia      Hypercholesterolemia      Hypertension      Left foot drop      Liver disease     alcoholic liver disease, alcoholic cirrohsosis, portal hypertension     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)     ?When diagnosed, patient not aware of this     Moderate mitral regurgitation      Monoclonal gammopathy      Neuropathy      Noninfectious ileitis     diverticulitis of colon     PE (pulmonary embolism) 2006     Prostate cancer (H) 2000    Treated with radiation (seed implants in 2000) -- no problems since     Renal disease     kidney stones     Syncope      Thoracic aortic aneurysm, without rupture      Thrombocytopenia (H)       Thrombosis of leg      Urethral stricture        Past Surgical History   I have reviewed this patient's surgical history and updated it with pertinent information if needed.  Past Surgical History:   Procedure Laterality Date     AMPUTATE LEG BELOW KNEE Left 04/2014    related to gangrene, started as foot ulcer related to neuropathy     APPENDECTOMY       ARTHROPLASTY KNEE Right 9/19/2014    Procedure: ARTHROPLASTY KNEE;  Surgeon: Saima Howard MD;  Location:  OR     CARDIAC SURGERY      CABG     CHOLECYSTECTOMY       COLONOSCOPY       CYSTOSCOPY, DILATE URETHRA, COMBINED N/A 10/12/2016    Procedure: COMBINED CYSTOSCOPY, DILATE URETHRA;  Surgeon: Dimitry Bello MD;  Location:  OR     ENDOSCOPIC STRIPPING VEIN(S)       esophagogastroduodenoscopy       EYE SURGERY      cataracts     GENITOURINARY SURGERY      Prostate Seen Implants     HERNIA REPAIR      Umbilical     IR IVC FILTER PLACEMENT       ORTHOPEDIC SURGERY      (R) knee scope     ORTHOPEDIC SURGERY      total hip      ORTHOPEDIC SURGERY      elbow surgery       Prior to Admission Medications   Prior to Admission Medications   Prescriptions Last Dose Informant Patient Reported? Taking?   ASPIRIN EC PO 11/29/2017 at Unknown time  Yes Yes   Sig: Take 81 mg by mouth daily   CARVEDILOL PO 11/29/2017 at Unknown time  Yes Yes   Sig: Take 3.125 mg by mouth 2 times daily (with meals)    Gabapentin (NEURONTIN PO) 11/29/2017 at Unknown time  Yes Yes   Sig: Take 600 mg by mouth 3 times daily 600 mg in morning, 600 mg midday & 1200 mg at HS   WARFARIN SODIUM PO 11/28/2017 at Unknown time  Yes Yes   Sig: Take 5 mg by mouth daily 5mg Monday to Saturday and no dose on sundays.   evolocumab (REPATHA) 140 MG/ML prefilled syringe 11/17/2017  Yes Yes   Sig: Inject 1 pen subcutaneously every 2 weeks. For sample use only   order for DME   No No   Sig: [facility] Phone [phone] Fax [phone]    Primary Dressing Woun'Dres Gel   Qty DO NOT  "DISPENSE  Secondary Dressing Mepilex 4x4 Qty 60 (for two wounds)  Length of Need: 1 month  Frequency of dressing change: daily   potassium chloride SA (K-DUR/KLOR-CON M) 10 MEQ CR tablet 2017 at Unknown time  Yes Yes   Sig: Take 10 mEq by mouth daily   torsemide (DEMADEX) 20 MG tablet   Yes Yes   Sig: Take 20 mg by mouth 2 times daily       Facility-Administered Medications: None     Allergies   Allergies   Allergen Reactions     Hmg-Coa-R Inhibitors Other (See Comments)     Rhabdomyolysis occurred within a couple days     Ciprofloxacin Itching     Severe itching \"like ants were crawling\"       Social History   I have reviewed this patient's social history and updated it with pertinent information if needed. Antonio Ramirez  reports that he has never smoked. He does not have any smokeless tobacco history on file. He reports that he drinks alcohol. He reports that he does not use illicit drugs.    Family History   I have reviewed this patient's family history and updated it with pertinent information if needed.   Family History   Problem Relation Age of Onset     Lung Cancer Mother      Heart Failure Father       age 87       Review of Systems   The 10 point Review of Systems is negative other than noted in the HPI or here.  He does have to urinate 0-2 times per night.      Physical Exam   Temp: 98.8  F (37.1  C) Temp src: Oral BP: 134/61 Pulse: 67   Resp: 16 SpO2: 95 % O2 Device: None (Room air)    Vital Signs with Ranges  Temp:  [98.3  F (36.8  C)-98.8  F (37.1  C)] 98.8  F (37.1  C)  Pulse:  [67-69] 67  Resp:  [16-18] 16  BP: (120-134)/(61-76) 134/61  SpO2:  [95 %-97 %] 95 %  220 lbs 0 oz    Constitutional: Awake, alert, cooperative, moderate left leg discomfort.  Eyes: Conjunctiva and pupils examined and normal.  HEENT: Moist mucous membranes, normal dentition.  Respiratory: Clear to auscultation bilaterally, no crackles or wheezing.  Cardiovascular: Regular rate and rhythm, normal S1 and S2, and 2/6 " systolic murmur noted.  No right ankle swelling (but uses a nocturnal leg compression device to help with venous stasis).    GI: Soft, non-distended, non-tender, normal bowel sounds.  Lymph/Hematologic: No anterior cervical or supraclavicular adenopathy.  Skin: moderate swelling, redness and tenderness left BKA, with about 1-2 cm abrasion over BKA stump (he noted some fluid leaking from there the last 1-2 days, none now).  He did have chaffing over posterior left knee -- which has improved since he has the tighter BKA rubber sleeve.   Musculoskeletal: No joint swelling, erythema or tenderness.  Neurologic: decreased light touch to mid-shin on right, and left BKA with decreased light touch at distal end but full sensation at level of just below knee  Psychiatric: Alert, oriented to person, place and time, no obvious anxiety or depression.    Data   Data reviewed today:  No EKG done yet.      Recent Labs  Lab 11/29/17  1829 11/29/17  1649   WBC  --  11.0   HGB  --  14.0   MCV  --  95   PLT  --  125*   INR 2.53* Canceled, Test credited   NA  --  131*   POTASSIUM  --  3.9   CHLORIDE  --  96   CO2  --  26   BUN  --  23   CR  --  1.23   ANIONGAP  --  9   BENNIE  --  8.4*   GLC  --  105*       Imaging:  Recent Results (from the past 24 hour(s))   US Extremity Non Vascular Left    Narrative    US EXTREMITY NON VASCULAR LEFT  11/29/2017 5:26 PM     HISTORY: Evaluate abscess and stump site.     COMPARISON: None.    FINDINGS: Edema throughout the distal left lower extremity stump in  the remaining tissues about the amputated left foot. There is a 6.6 x  3.1 x 1.6 cm complex fluid collection anteriorly. This could be a  seroma or abscess. There is considerable increased vascularity within  the edematous tissues.      Impression    IMPRESSION:   1. Edematous hyperemic left lower extremity stump tissues.  2. Complex fluid collection as described. Abscess cannot be excluded.    MALKA DE MD

## 2024-03-26 NOTE — ANESTHESIA CARE TRANSFER NOTE
Patient: Antonio Ramirez    Procedure(s):  CYSTOSCOPY, BILATERAL URETEROSCOPY,  HOLMIUM LASER LITHOTRIPSY, BILATERAL STENT PLACEMENT, STONE BASKETING - Wound Class: II-Clean Contaminated    Diagnosis: BILATERAL URETERAL STONE   Diagnosis Additional Information: No value filed.    Anesthesia Type:   General, ETT     Note:  Airway :Face Mask  Patient transferred to:PACU  Comments: To \[facility]\"" PACU: Arouses easily, good airway, 02 face mask, VSS  Report to RNHandoff Report: Identifed the Patient, Identified the Reponsible Provider, Reviewed the pertinent medical history, Discussed the surgical course, Reviewed Intra-OP anesthesia mangement and issues during anesthesia, Set expectations for post-procedure period and Allowed opportunity for questions and acknowledgement of understanding      Vitals: (Last set prior to Anesthesia Care Transfer)    CRNA VITALS  6/28/2018 1222 - 6/28/2018 1301      6/28/2018             Pulse: 71    SpO2: 100 %                Electronically Signed By: DIPAK Lowe CRNA  June 28, 2018  1:01 PM   No - the patient is unable to be screened due to medical condition

## 2024-04-09 NOTE — PROGRESS NOTES
RADIOLOGY PROCEDURE NOTE  Patient name: Antonio Ramirez  MRN: [id number]  : 1945    Pre-procedure diagnosis: Ascites  Post-procedure diagnosis: Same    Procedure Date/Time: May 31, 2019  10:29 AM  Procedure: Paracentesis  Estimated blood loss: None  Specimen(s) collected with description: Ascites.  The patient tolerated the procedure well with no immediate complications.    See imaging dictation for procedural details and findings.    Provider name: Juarez Cardona  Assistant(s):None     Xray done per Hali rad tech.

## 2024-10-24 NOTE — PROGRESS NOTES
Here for nurse visit blood draw    1 lavender, 1 mint and 1 gold tube drawn from right arm with butterfly needle    Patient tolerated well and left office in stable condition   Paracentesis:     Pt tolerated well. VSS. 5,700 ml cloudy red fluid removed from abdomen w/o difficulty. Bandaid applied to site - CDI.       Pt back to Care Suites. Detailed report given to Zhen Choe RN.

## 2024-11-23 NOTE — PROGRESS NOTES
Pt sts she was referred by her Oncologist today for dehydration, hypotension, n/v, and constipation    Pt sts she has hx of lung cancer and last treatment she had was on 11/5    Pt sts she feels SOB for about 2 weeks nd uses an inhaler incase she needs it     Pt denies any fevers    Pt was just diagnosed with UTI but has not started the antibiotic yet    PT has hx of DB and Heart disease   RADIOLOGY PROCEDURE NOTE  Patient name: Antonio Ramirez  MRN: [id number]  : 1945    Pre-procedure diagnosis: Ascites  Post-procedure diagnosis: Same    Procedure Date/Time: 2019  2:27 PM  Procedure: Paracentesis  Estimated blood loss: None  Specimen(s) collected with description: Blood tinged fluid  The patient tolerated the procedure well with no immediate complications.  Significant findings:none    See imaging dictation for procedural details.    Provider name: Ran Giraldo PA-C  Assistant(s):None

## 2024-12-02 NOTE — TELEPHONE ENCOUNTER
Agree based on our parameters   May need iron or blood if bleeding significant   ALH   I agree with recommendation for patient to be seen in the emergency department

## 2025-04-14 NOTE — MR AVS SNAPSHOT
After Visit Summary   6/27/2017    Antonio Ramirez    MRN: [id number]           Patient Information     Date Of Birth          1945        Visit Information        Provider Department      6/27/2017 10:00 AM Matt Bush DPM [facility]        Care Instructions    Wound Care Recommendations:    1)  Keep the wound covered by a bandage when bathing.    2)  Gently clean the wound with soap water, separate from bath/shower water.      3)  Each day, apply a topical antibiotic ointment to the wound (Neosporin, Triple antibiotic, Bacitracin).   Cover with large band-aid or gauze.      4)  Please seek immediate medical attention if any increasing redness, drainage, smell, or pain related to the wound.     5)  Please return to clinic in the period of time requested.        [phone]           Follow-ups after your visit        Your next 10 appointments already scheduled     Jun 27, 2017 10:00 AM CDT   [facility] Visit with Matt Bush DPM   [facility]    [address] [id number] [phone]            Jun 30, 2017  3:00 PM CDT   Office Visit with Main Hardy MD   [facility]    [address] [id number] [phone]           Bring a current list of meds and any records pertaining to this visit.  For Physicals, please bring immunization records and any forms needing to be filled out.  Please arrive 10 minutes early to complete paperwork.              Who to contact     If you have questions or need follow up information about today's clinic visit or your schedule please contact [facility] directly at [phone].  Normal or non-critical lab and imaging results will be communicated to you by MyChart, letter or phone within 4 business days after the clinic has received the results. If you do not hear from us within 7 days, please contact the clinic  "through [email or web address] or phone. If you have a critical or abnormal lab result, we will notify you by phone as soon as possible.  Submit refill requests through [email or web address] or call your pharmacy and they will forward the refill request to us. Please allow 3 business days for your refill to be completed.          Additional Information About Your Visit        [employer] Information     [email or web address] gives you secure access to your electronic health record. If you see a primary care provider, you can also send messages to your care team and make appointments. If you have questions, please call your primary care clinic.  If you do not have a primary care provider, please call [phone] and they will assist you.        Care EveryWhere ID     This is your Care EveryWhere ID. This could be used by other organizations to access your [place] medical records  [id number]        Your Vitals Were     Pulse Temperature Height BMI (Body Mass Index)          61 98.7  F (37.1  C) (Tympanic) 6' 1\" (1.854 m) 32.46 kg/m2         Blood Pressure from Last 3 Encounters:   06/27/17 127/74   06/07/17 127/66   06/07/17 142/89    Weight from Last 3 Encounters:   06/27/17 246 lb (111.6 kg)   05/30/17 246 lb (111.6 kg)   05/24/17 251 lb 14.4 oz (114.3 kg)              Today, you had the following     No orders found for display         Today's Medication Changes          These changes are accurate as of: 6/27/17  9:58 AM.  If you have any questions, ask your nurse or doctor.               These medicines have changed or have updated prescriptions.        Dose/Directions    warfarin 5 MG tablet   Commonly known as:  COUMADIN   This may have changed:  additional instructions   Used for:  Right knee DJD        Dose:  5 mg   Take 1 tablet (5 mg) by mouth daily Take 5 mg daily unless otherwise instructed by orthopedics.   Quantity:  40 tablet   Refills:  0                Primary Care Provider Office Phone # Fax #    Main Hardy [phone] "       [facility] [address] [facility] [address]        Equal Access to Services     KRISTINALYSA JAYNA : Hadii aad ku hadgiselleo Soshalomali, waaxda luqadaha, qaybta kaalmada albinayaadenise, igor smith jeffjigna montemayorbreonna lesliemukulluis mix. So [facility] [phone].    ATENCIÓN: Si habla español, tiene a shrestha disposición servicios gratuitos de asistencia lingüística. Llame al [phone].    We comply with applicable federal civil rights laws and [place] laws. We do not discriminate on the basis of race, color, national origin, age, disability sex, sexual orientation or gender identity.            Thank you!     Thank you for choosing [facility]  for your care. Our goal is always to provide you with excellent care. Hearing back from our patients is one way we can continue to improve our services. Please take a few minutes to complete the written survey that you may receive in the mail after your visit with us. Thank you!             Your Updated Medication List - Protect others around you: Learn how to safely use, store and throw away your medicines at www.disposemymeds.org.          This list is accurate as of: 6/27/17  9:58 AM.  Always use your most recent med list.                   Brand Name Dispense Instructions for use Diagnosis    ASPIRIN EC PO      Take 81 mg by mouth daily        BUMEX PO      Take 1 mg by mouth 2 times daily        CARVEDILOL PO      Take 3.125 mg by mouth 2 times daily (with meals) Per pt dose was reduced from 6.25 a few months ago        NEURONTIN PO      Take 600 mg by mouth 3 times daily Takes 600 mg in AM, 600 mg around midday and 1200 mg at HS.        potassium chloride francis er    K-DUR     Take 10 mEq by mouth daily        warfarin 5 MG tablet    COUMADIN    40 tablet    Take 1 tablet (5 mg) by mouth daily Take 5 mg daily unless otherwise instructed by orthopedics.    Right knee DJD       ZETIA PO      Take 10 mg by mouth daily           No

## 2025-04-29 NOTE — TELEPHONE ENCOUNTER
Pt has a normal liver, thyroid and kidney function.  Blood sugar and A1c were normal and no evidence of diabetes or prediabetes.  Electrolytes appropriate.   No anemia present on lab testing.  Her occult stool was negative for colon cancer screening, repeat in 1 year.      You have high cholesterol.  Her triglyceride level is improved from last year, but LDL (bad cholesterol) is higher.    The 10-year ASCVD risk score (Candy HODGSON, et al., 2019) is: 7.3%    Values used to calculate the score:      Age: 63 years      Sex: Female      Is Non- : No      Diabetic: No      Tobacco smoker: No      Systolic Blood Pressure: 134 mmHg      Is BP treated: No      HDL Cholesterol: 41 mg/dL      Total Cholesterol: 225 mg/dL    Less than 5%= Low risk.  Between 5-7.4% = borderline line.  Between 7.5-19.9% = intermediate risk, a moderate intensity statin may be indicated.  Greater or equal to 20% = high risk, high intensity statin recommended.    Based on risk score, there is no need for statin at this time, but she is close and would recommend low cholesterol diet and exercise below.  Cholesterol goals are as follows:    Elevated LDL, goal <130.   HDL goal >60  Triglyceride goal <150  Total cholesterol goal <200    Encouraged heart healthy diet rich in fruits, vegetables, whole grains, lean meats, and fish encouraged (Mediterranean-style diet).  Eat less carbohydrates (less potatoes, pasta, bread).  Recommend using extra virgin olive oil, consuming nuts (almonds, walnuts) and plenty of purple fruit (blueberries, blackberries, eggplant, red cabbage) help increase your good cholesterol (HDL).     See American Heart Association website: https://healthyforgood.heart.org    Recommend regular cardio exercise with goal of 30 minutes 5 days per week.  You may need to start slow, moderate intensity (I.e. brisk walking) for at least 10min and slowly work up to goal.  If you do develop any chest pain, chest pressure,  This patient should have a hospital follow up appointment with me as soon as possible, can we get this set up   dizziness, difficulty breathing, stop and call our office or go to ER.     Will see her in May for a BP recheck.

## (undated) DEVICE — SU ETHIBOND 0 CTX CR  8X18" CX31D

## (undated) DEVICE — PACK CYSTOSCOPY SBA15CYFSI

## (undated) DEVICE — CABLE ADAPTER PACING 6FT REMINGTON ADAP 2000

## (undated) DEVICE — RAD RX ISOVUE 300 (50ML) 61% IOPAMIDOL CHARGE PER ML

## (undated) DEVICE — ESU PENCIL W/SMOKE EVAC NEPTUNE STRYKER 0703-046-000

## (undated) DEVICE — SOL WATER IRRIG 1000ML BOTTLE 2F7114

## (undated) DEVICE — STPL SKIN 35W ROTATING HEAD PRW35

## (undated) DEVICE — LASER FIBER HOLMIUM FLEXIVA 200UM M0068403910 840-391

## (undated) DEVICE — SOL WATER IRRIG 3000ML BAG 2B7117

## (undated) DEVICE — BAG CYSTO TABLE DRAIN

## (undated) DEVICE — CATH URETERAL OPEN END 6FR AXXCESS

## (undated) DEVICE — STPL SKIN 35W APPOSE 8886803712

## (undated) DEVICE — LINEN TOWEL PACK X5 5464

## (undated) DEVICE — ENDO ADAPTER CHECK-FLO DISP 27550-CKU

## (undated) DEVICE — DRSG KERLIX FLUFFS X5

## (undated) DEVICE — PAD CHUX UNDERPAD 23X24" 7136

## (undated) DEVICE — GLOVE PROTEXIS W/NEU-THERA 7.5  2D73TE75

## (undated) DEVICE — MIDAS REX DISSECTING TOOL 6MM 9BA60

## (undated) DEVICE — SU ETHILON 3-0 PS-2 18" 1669H

## (undated) DEVICE — PACK NEURO SNE15SNFSA

## (undated) DEVICE — BLADE CLIPPER 4412A

## (undated) DEVICE — GLOVE PROTEXIS MICRO 8.0  2D73PM80

## (undated) DEVICE — ESU CLEANER TIP 31142717

## (undated) DEVICE — GUIDEWIRE ZIPWIRE STR STIFF .035"X150CM M006630222B0

## (undated) DEVICE — RETR ELASTIC STAYS LONE STAR BLUNT DUAL LEAD 3550-1G

## (undated) DEVICE — SYR 30ML LL W/O NDL

## (undated) DEVICE — BASKET STONE RETRIEVAL NTNL ZERO TIP 1.9FRX90CM M0063901050

## (undated) DEVICE — BNDG ELASTIC 4"X5YDS UNSTERILE 6611-40

## (undated) DEVICE — SU SILK 3-0 SH CR 8X18" C013D

## (undated) DEVICE — BASKET STONE RETRIEVAL ZERO TIP 2.4FRX120CM M0063901010

## (undated) DEVICE — SUCTION IRR SYSTEM W/O TIP INTERPULSE HANDPIECE 0210-100-000

## (undated) DEVICE — SU ETHIBOND 0 TIE 6X30" X306H

## (undated) DEVICE — BONE WAX 2.5GM W31G

## (undated) DEVICE — DRSG WOUND VAC GRANUFOAM MED SILVER M8275096/5

## (undated) DEVICE — GLOVE PROTEXIS POWDER FREE 8.0 ORTHOPEDIC 2D73ET80

## (undated) DEVICE — GUIDEWIRE SENSOR DUAL FLEX STR 0.035"X150CM M0066703080

## (undated) DEVICE — DRSG WOUND VAC GRANUFOAM LG SILVER M8275099/5

## (undated) DEVICE — DRSG WOUND VAC SPONGE MED BLACK M8275052/5

## (undated) DEVICE — GLOVE PROTEXIS W/NEU-THERA 8.0  2D73TE80

## (undated) DEVICE — INTRODUCER CATH VASC 5FRX10CM  MPIS-501-NT-U-SST

## (undated) DEVICE — DRAIN JACKSON PRATT RESERVOIR 100ML SU130-1305

## (undated) DEVICE — SOLUTION WOUND CLEANSING 3/4OZ 10% PVP EA-L3011FB-50

## (undated) DEVICE — SPECIMEN CULTURETTE DBL SWAB 220109

## (undated) DEVICE — CAST PADDING 4" COTTON WEBRIL UNSTERILE 9084

## (undated) DEVICE — DRSG WOUND VAC GRANUFOAM SILVER SM

## (undated) DEVICE — SU ETHILON 4-0 FS-2 18" 662H

## (undated) DEVICE — MANIFOLD NEPTUNE 4 PORT 700-20

## (undated) DEVICE — DRSG GAUZE 4X4" TOPPER

## (undated) DEVICE — NDL 18GA 1.5" 305196

## (undated) DEVICE — PREP SKIN SCRUB TRAY 4461A

## (undated) DEVICE — DRSG ADAPTIC 3X8" 6113

## (undated) DEVICE — SU MONOCRYL 3-0 PS-2 27" Y427H

## (undated) DEVICE — CATH URETERAL DUAL LUMEN 10FRX54CM M0064051000

## (undated) DEVICE — GLOVE PROTEXIS BLUE W/NEU-THERA 8.5  2D73EB85

## (undated) DEVICE — NDL 25GA 1.5" 305127

## (undated) DEVICE — GUIDEWIRE URO STR STIFF .035"X150CM NITINOL 150NSS35

## (undated) DEVICE — DRAPE STERI TOWEL LG 1010

## (undated) DEVICE — TUBING SUCTION 12"X1/4" N612

## (undated) DEVICE — SHEATH URETERAL ACCESS NAVIGATOR HD 11/13FRX36CM M0062502220

## (undated) DEVICE — SHEATH URETERAL ACCESS NAVIGATOR 13/15FRX36CM 250-209

## (undated) DEVICE — GUN RANEY W/30 CLIPS CG8900

## (undated) DEVICE — SPONGE SURGIFOAM 100 1974

## (undated) DEVICE — DRSG ABDOMINAL 07 1/2X8" 7197D

## (undated) DEVICE — SYR BULB IRRIG 50ML LATEX FREE 0035280

## (undated) DEVICE — SU DERMABOND PRINEO 22CM CLR222US

## (undated) DEVICE — SU SILK 2-0 TIE 24" SA75H

## (undated) DEVICE — DRSG KERLIX 4 1/2"X4YDS ROLL 6715

## (undated) DEVICE — PAD FOAM MCGUIRE KIT 0814-0150

## (undated) DEVICE — SU VICRYL 2-0 CT-2 CR 8X18" J726D

## (undated) DEVICE — SU SILK 2-0 SH CR 8X18" C012D

## (undated) DEVICE — DRSG TELFA 3X8" 1238

## (undated) DEVICE — SU VICRYL 0 CT-2 27" J334H

## (undated) DEVICE — SU ETHIBOND 2 V-37 4X30" MX69G

## (undated) DEVICE — SOL NACL 0.9% IRRIG 1000ML BOTTLE 07138-09

## (undated) DEVICE — SOL NACL 0.9% INJ 1000ML BAG 2B1324X

## (undated) DEVICE — PACK TOTAL KNEE SOP15TKFSD

## (undated) DEVICE — Device

## (undated) DEVICE — DRSG XEROFORM 1X8"

## (undated) DEVICE — DRSG GAUZE 4X4" 3033

## (undated) DEVICE — SURGICEL HEMOSTAT 2X14" 1951

## (undated) DEVICE — GLOVE PROTEXIS BLUE W/NEU-THERA 8.0  2D73EB80

## (undated) DEVICE — PERFORATOR 14MM CODMAN

## (undated) DEVICE — GOWN XXLG REINFORCED 9071EL

## (undated) DEVICE — ESU GROUND PAD UNIVERSAL W/O CORD

## (undated) DEVICE — BLADE SAW SAGITTAL STRK 25X79.5X1.24MM 4/2000 2108-318-000

## (undated) DEVICE — SPONGE COTTONOID 1X3" 80-1408

## (undated) DEVICE — IMM PILLOW ABDUCT HIP MED 31143061

## (undated) DEVICE — BONE CLEANING TIP INTERPULSE FEMORAL CANAL 0210-008-000

## (undated) DEVICE — SU VICRYL 2-0 CT-1 27" J339H

## (undated) DEVICE — BNDG ELASTIC 3"X5YDS UNSTERILE 6611-30

## (undated) DEVICE — PACK EXTREMITY SOP15EXFSD

## (undated) DEVICE — DRAPE MICROSOPE ZEISS 52X150" 6120

## (undated) DEVICE — BLADE SAW SAGITTAL STRK 19.5X95X1.27MM 2108-109-000S15

## (undated) DEVICE — CANISTER WOUND VAC W/GEL 500ML M8275063/5

## (undated) DEVICE — NDL PERC ENTRY THINWALL 18GA 7.0" G00166

## (undated) DEVICE — SPONGE LAP 18X18" X8435

## (undated) DEVICE — SU NUROLON 4-0 TF CR 8X18" C584D

## (undated) DEVICE — RX SURGIFLO HEMOSTATIC MATRIX W/THROMBIN 8ML 2994

## (undated) DEVICE — INTRODUCER SHEATH FAST-CATH 6FRX12CM 406103

## (undated) DEVICE — DRSG ABDOMINAL 12X16"

## (undated) DEVICE — SHEATH URETERAL ACCESS NAVIGATOR 13/15FRX46CM M0062502100

## (undated) DEVICE — PIN SKULL MAYFIELD ADULT TITANIUM 3/PK A1120

## (undated) DEVICE — PACK TOTAL HIP W/POUCH SOP15HPFSM

## (undated) DEVICE — LINEN LEG ROLL 5489

## (undated) DEVICE — SU STRATAFIX PDS PLUS 0 CT 45CM SXPP1A406

## (undated) DEVICE — CATH EP PACEL 5FRX110CM 1MM RIGHT HEART CURVE 401763

## (undated) DEVICE — DEFIB PRO-PADZ LVP LQD GEL ADULT 8900-2105-01

## (undated) DEVICE — TOTE ANGIO CORP PC15AT SAN32CC83O

## (undated) DEVICE — PREP DURAPREP 06ML APL 8635

## (undated) DEVICE — SU VICRYL 2-0 CT-1 27" UND J259H

## (undated) DEVICE — SYR 10ML FINGER CONTROL W/O NDL 309695

## (undated) DEVICE — SUCTION FRAZIER 12FR W/HANDLE K73

## (undated) DEVICE — CAST PADDING 4" STERILE 9044S

## (undated) DEVICE — BONE CLEANING TIP INTERPULSE  0210-010-000

## (undated) DEVICE — ESU ELEC BLADE 2.75" COATED/INSULATED E1455

## (undated) DEVICE — GLOVE PROTEXIS W/NEU-THERA 8.5  2D73TE85

## (undated) DEVICE — CAST PADDING 6" UNSTERILE 9046

## (undated) DEVICE — DRSG ABSB SUREPREP WIPE SKIN PROTECTION MSC1500

## (undated) DEVICE — DRAPE IOBAN INCISE 23X17" 6650EZ

## (undated) DEVICE — SU VICRYL 0 CT-1 27" J340H

## (undated) DEVICE — DRAIN JACKSON PRATT CHANNEL 10FR RND SIL W/TROCAR JP-2227

## (undated) DEVICE — PREP CHLORAPREP 26ML TINTED ORANGE  260815

## (undated) DEVICE — BLADE KNIFE SURG 11 371111

## (undated) DEVICE — DRAIN PENROSE 0.25"X18" LATEX FREE GR201

## (undated) DEVICE — BLADE KNIFE SURG 10 371110

## (undated) DEVICE — DRAPE SHEET REV FOLD 3/4 9349

## (undated) RX ORDER — FENTANYL CITRATE 50 UG/ML
INJECTION, SOLUTION INTRAMUSCULAR; INTRAVENOUS
Status: DISPENSED
Start: 2017-12-04

## (undated) RX ORDER — DEXAMETHASONE SODIUM PHOSPHATE 4 MG/ML
INJECTION, SOLUTION INTRA-ARTICULAR; INTRALESIONAL; INTRAMUSCULAR; INTRAVENOUS; SOFT TISSUE
Status: DISPENSED
Start: 2017-12-08

## (undated) RX ORDER — LIDOCAINE HYDROCHLORIDE 40 MG/ML
SOLUTION TOPICAL
Status: DISPENSED
Start: 2019-03-11

## (undated) RX ORDER — HYDROMORPHONE HYDROCHLORIDE 1 MG/ML
INJECTION, SOLUTION INTRAMUSCULAR; INTRAVENOUS; SUBCUTANEOUS
Status: DISPENSED
Start: 2018-11-27

## (undated) RX ORDER — CEFAZOLIN SODIUM 2 G/100ML
INJECTION, SOLUTION INTRAVENOUS
Status: DISPENSED
Start: 2017-12-08

## (undated) RX ORDER — LIDOCAINE HYDROCHLORIDE 20 MG/ML
INJECTION, SOLUTION EPIDURAL; INFILTRATION; INTRACAUDAL; PERINEURAL
Status: DISPENSED
Start: 2018-06-28

## (undated) RX ORDER — PROPOFOL 10 MG/ML
INJECTION, EMULSION INTRAVENOUS
Status: DISPENSED
Start: 2017-12-04

## (undated) RX ORDER — FENTANYL CITRATE 50 UG/ML
INJECTION, SOLUTION INTRAMUSCULAR; INTRAVENOUS
Status: DISPENSED
Start: 2018-04-07

## (undated) RX ORDER — FENTANYL CITRATE 50 UG/ML
INJECTION, SOLUTION INTRAMUSCULAR; INTRAVENOUS
Status: DISPENSED
Start: 2017-12-06

## (undated) RX ORDER — ONDANSETRON 2 MG/ML
INJECTION INTRAMUSCULAR; INTRAVENOUS
Status: DISPENSED
Start: 2018-11-27

## (undated) RX ORDER — ETOMIDATE 2 MG/ML
INJECTION INTRAVENOUS
Status: DISPENSED
Start: 2018-06-28

## (undated) RX ORDER — HEPARIN SODIUM 200 [USP'U]/100ML
INJECTION, SOLUTION INTRAVENOUS
Status: DISPENSED
Start: 2021-01-01

## (undated) RX ORDER — PROPOFOL 10 MG/ML
INJECTION, EMULSION INTRAVENOUS
Status: DISPENSED
Start: 2020-01-01

## (undated) RX ORDER — GINSENG 100 MG
CAPSULE ORAL
Status: DISPENSED
Start: 2017-12-06

## (undated) RX ORDER — GINSENG 100 MG
CAPSULE ORAL
Status: DISPENSED
Start: 2018-11-27

## (undated) RX ORDER — PROPOFOL 10 MG/ML
INJECTION, EMULSION INTRAVENOUS
Status: DISPENSED
Start: 2018-06-28

## (undated) RX ORDER — BUPIVACAINE HYDROCHLORIDE AND EPINEPHRINE 5; 5 MG/ML; UG/ML
INJECTION, SOLUTION EPIDURAL; INTRACAUDAL; PERINEURAL
Status: DISPENSED
Start: 2017-12-06

## (undated) RX ORDER — NEOSTIGMINE METHYLSULFATE 1 MG/ML
VIAL (ML) INJECTION
Status: DISPENSED
Start: 2018-06-28

## (undated) RX ORDER — HEPARIN SODIUM 1000 [USP'U]/ML
INJECTION, SOLUTION INTRAVENOUS; SUBCUTANEOUS
Status: DISPENSED
Start: 2021-01-01

## (undated) RX ORDER — LIDOCAINE HYDROCHLORIDE 20 MG/ML
INJECTION, SOLUTION EPIDURAL; INFILTRATION; INTRACAUDAL; PERINEURAL
Status: DISPENSED
Start: 2018-11-27

## (undated) RX ORDER — BUPIVACAINE HYDROCHLORIDE 5 MG/ML
INJECTION, SOLUTION EPIDURAL; INTRACAUDAL
Status: DISPENSED
Start: 2017-12-06

## (undated) RX ORDER — CEFAZOLIN SODIUM 2 G/100ML
INJECTION, SOLUTION INTRAVENOUS
Status: DISPENSED
Start: 2018-07-24

## (undated) RX ORDER — EPINEPHRINE IN SOD CHLOR,ISO 1 MG/10 ML
SYRINGE (ML) INTRAVENOUS
Status: DISPENSED
Start: 2020-01-01

## (undated) RX ORDER — NALOXONE HYDROCHLORIDE 0.4 MG/ML
INJECTION, SOLUTION INTRAMUSCULAR; INTRAVENOUS; SUBCUTANEOUS
Status: DISPENSED
Start: 2019-03-13

## (undated) RX ORDER — ROPIVACAINE HYDROCHLORIDE 2 MG/ML
INJECTION, SOLUTION EPIDURAL; INFILTRATION; PERINEURAL
Status: DISPENSED
Start: 2018-11-27

## (undated) RX ORDER — FENTANYL CITRATE 50 UG/ML
INJECTION, SOLUTION INTRAMUSCULAR; INTRAVENOUS
Status: DISPENSED
Start: 2021-01-01

## (undated) RX ORDER — FENTANYL CITRATE 50 UG/ML
INJECTION, SOLUTION INTRAMUSCULAR; INTRAVENOUS
Status: DISPENSED
Start: 2019-09-27

## (undated) RX ORDER — PROPOFOL 10 MG/ML
INJECTION, EMULSION INTRAVENOUS
Status: DISPENSED
Start: 2018-04-07

## (undated) RX ORDER — ONDANSETRON 2 MG/ML
INJECTION INTRAMUSCULAR; INTRAVENOUS
Status: DISPENSED
Start: 2018-06-28

## (undated) RX ORDER — KETOROLAC TROMETHAMINE 30 MG/ML
INJECTION, SOLUTION INTRAMUSCULAR; INTRAVENOUS
Status: DISPENSED
Start: 2018-11-27

## (undated) RX ORDER — POTASSIUM CHLORIDE 1500 MG/1
TABLET, EXTENDED RELEASE ORAL
Status: DISPENSED
Start: 2020-03-11

## (undated) RX ORDER — LIDOCAINE HYDROCHLORIDE 20 MG/ML
INJECTION, SOLUTION EPIDURAL; INFILTRATION; INTRACAUDAL; PERINEURAL
Status: DISPENSED
Start: 2018-04-07

## (undated) RX ORDER — GLYCOPYRROLATE 0.2 MG/ML
INJECTION, SOLUTION INTRAMUSCULAR; INTRAVENOUS
Status: DISPENSED
Start: 2018-11-27

## (undated) RX ORDER — GLYCOPYRROLATE 0.2 MG/ML
INJECTION, SOLUTION INTRAMUSCULAR; INTRAVENOUS
Status: DISPENSED
Start: 2018-04-07

## (undated) RX ORDER — DEXAMETHASONE SODIUM PHOSPHATE 4 MG/ML
INJECTION, SOLUTION INTRA-ARTICULAR; INTRALESIONAL; INTRAMUSCULAR; INTRAVENOUS; SOFT TISSUE
Status: DISPENSED
Start: 2018-08-14

## (undated) RX ORDER — CEFAZOLIN SODIUM 2 G/100ML
INJECTION, SOLUTION INTRAVENOUS
Status: DISPENSED
Start: 2018-04-07

## (undated) RX ORDER — ALBUMIN, HUMAN INJ 5% 5 %
SOLUTION INTRAVENOUS
Status: DISPENSED
Start: 2018-11-27

## (undated) RX ORDER — LIDOCAINE HYDROCHLORIDE 20 MG/ML
INJECTION, SOLUTION EPIDURAL; INFILTRATION; INTRACAUDAL; PERINEURAL
Status: DISPENSED
Start: 2020-01-01

## (undated) RX ORDER — REGADENOSON 0.08 MG/ML
INJECTION, SOLUTION INTRAVENOUS
Status: DISPENSED
Start: 2019-10-30

## (undated) RX ORDER — HYDROMORPHONE HYDROCHLORIDE 1 MG/ML
INJECTION, SOLUTION INTRAMUSCULAR; INTRAVENOUS; SUBCUTANEOUS
Status: DISPENSED
Start: 2017-12-08

## (undated) RX ORDER — ONDANSETRON 2 MG/ML
INJECTION INTRAMUSCULAR; INTRAVENOUS
Status: DISPENSED
Start: 2017-12-01

## (undated) RX ORDER — PROPOFOL 10 MG/ML
INJECTION, EMULSION INTRAVENOUS
Status: DISPENSED
Start: 2018-08-14

## (undated) RX ORDER — FENTANYL CITRATE 50 UG/ML
INJECTION, SOLUTION INTRAMUSCULAR; INTRAVENOUS
Status: DISPENSED
Start: 2018-06-28

## (undated) RX ORDER — DEXAMETHASONE SODIUM PHOSPHATE 4 MG/ML
INJECTION, SOLUTION INTRA-ARTICULAR; INTRALESIONAL; INTRAMUSCULAR; INTRAVENOUS; SOFT TISSUE
Status: DISPENSED
Start: 2018-11-27

## (undated) RX ORDER — FENTANYL CITRATE 50 UG/ML
INJECTION, SOLUTION INTRAMUSCULAR; INTRAVENOUS
Status: DISPENSED
Start: 2018-08-14

## (undated) RX ORDER — LIDOCAINE HYDROCHLORIDE 20 MG/ML
INJECTION, SOLUTION EPIDURAL; INFILTRATION; INTRACAUDAL; PERINEURAL
Status: DISPENSED
Start: 2017-12-06

## (undated) RX ORDER — FUROSEMIDE 10 MG/ML
INJECTION INTRAMUSCULAR; INTRAVENOUS
Status: DISPENSED
Start: 2020-01-08

## (undated) RX ORDER — CEFAZOLIN SODIUM 1 G/3ML
INJECTION, POWDER, FOR SOLUTION INTRAMUSCULAR; INTRAVENOUS
Status: DISPENSED
Start: 2018-06-28

## (undated) RX ORDER — GINSENG 100 MG
CAPSULE ORAL
Status: DISPENSED
Start: 2018-04-07

## (undated) RX ORDER — FENTANYL CITRATE 50 UG/ML
INJECTION, SOLUTION INTRAMUSCULAR; INTRAVENOUS
Status: DISPENSED
Start: 2018-11-27

## (undated) RX ORDER — LIDOCAINE HYDROCHLORIDE 20 MG/ML
INJECTION, SOLUTION EPIDURAL; INFILTRATION; INTRACAUDAL; PERINEURAL
Status: DISPENSED
Start: 2017-12-08

## (undated) RX ORDER — EPHEDRINE SULFATE 50 MG/ML
INJECTION, SOLUTION INTRAVENOUS
Status: DISPENSED
Start: 2020-01-01

## (undated) RX ORDER — FUROSEMIDE 10 MG/ML
INJECTION INTRAMUSCULAR; INTRAVENOUS
Status: DISPENSED
Start: 2020-03-11

## (undated) RX ORDER — FENTANYL CITRATE 50 UG/ML
INJECTION, SOLUTION INTRAMUSCULAR; INTRAVENOUS
Status: DISPENSED
Start: 2019-03-11

## (undated) RX ORDER — PROPOFOL 10 MG/ML
INJECTION, EMULSION INTRAVENOUS
Status: DISPENSED
Start: 2018-07-24

## (undated) RX ORDER — ALBUMIN (HUMAN) 12.5 G/50ML
SOLUTION INTRAVENOUS
Status: DISPENSED
Start: 2019-12-16

## (undated) RX ORDER — FENTANYL CITRATE 50 UG/ML
INJECTION, SOLUTION INTRAMUSCULAR; INTRAVENOUS
Status: DISPENSED
Start: 2017-12-08

## (undated) RX ORDER — GLYCOPYRROLATE 0.2 MG/ML
INJECTION, SOLUTION INTRAMUSCULAR; INTRAVENOUS
Status: DISPENSED
Start: 2019-03-11

## (undated) RX ORDER — FLUMAZENIL 0.1 MG/ML
INJECTION, SOLUTION INTRAVENOUS
Status: DISPENSED
Start: 2019-03-13

## (undated) RX ORDER — CEFAZOLIN SODIUM 2 G/100ML
INJECTION, SOLUTION INTRAVENOUS
Status: DISPENSED
Start: 2018-06-28

## (undated) RX ORDER — ONDANSETRON 2 MG/ML
INJECTION INTRAMUSCULAR; INTRAVENOUS
Status: DISPENSED
Start: 2018-08-14

## (undated) RX ORDER — LIDOCAINE HYDROCHLORIDE 20 MG/ML
INJECTION, SOLUTION EPIDURAL; INFILTRATION; INTRACAUDAL; PERINEURAL
Status: DISPENSED
Start: 2018-07-24

## (undated) RX ORDER — PROPOFOL 10 MG/ML
INJECTION, EMULSION INTRAVENOUS
Status: DISPENSED
Start: 2017-12-08

## (undated) RX ORDER — FENTANYL CITRATE 50 UG/ML
INJECTION, SOLUTION INTRAMUSCULAR; INTRAVENOUS
Status: DISPENSED
Start: 2018-07-24

## (undated) RX ORDER — ONDANSETRON 2 MG/ML
INJECTION INTRAMUSCULAR; INTRAVENOUS
Status: DISPENSED
Start: 2020-01-01

## (undated) RX ORDER — DEXAMETHASONE SODIUM PHOSPHATE 4 MG/ML
INJECTION, SOLUTION INTRA-ARTICULAR; INTRALESIONAL; INTRAMUSCULAR; INTRAVENOUS; SOFT TISSUE
Status: DISPENSED
Start: 2018-06-28

## (undated) RX ORDER — PROPOFOL 10 MG/ML
INJECTION, EMULSION INTRAVENOUS
Status: DISPENSED
Start: 2018-11-27

## (undated) RX ORDER — CEFAZOLIN SODIUM 2 G/100ML
INJECTION, SOLUTION INTRAVENOUS
Status: DISPENSED
Start: 2018-11-27

## (undated) RX ORDER — CEFAZOLIN SODIUM 2 G/100ML
INJECTION, SOLUTION INTRAVENOUS
Status: DISPENSED
Start: 2018-08-14

## (undated) RX ORDER — LIDOCAINE HYDROCHLORIDE 20 MG/ML
INJECTION, SOLUTION EPIDURAL; INFILTRATION; INTRACAUDAL; PERINEURAL
Status: DISPENSED
Start: 2018-08-14

## (undated) RX ORDER — ALBUMIN (HUMAN) 12.5 G/50ML
SOLUTION INTRAVENOUS
Status: DISPENSED
Start: 2020-01-13

## (undated) RX ORDER — ETOMIDATE 2 MG/ML
INJECTION INTRAVENOUS
Status: DISPENSED
Start: 2017-12-01

## (undated) RX ORDER — HYDROMORPHONE HYDROCHLORIDE 1 MG/ML
INJECTION, SOLUTION INTRAMUSCULAR; INTRAVENOUS; SUBCUTANEOUS
Status: DISPENSED
Start: 2017-12-06

## (undated) RX ORDER — NEOSTIGMINE METHYLSULFATE 1 MG/ML
VIAL (ML) INJECTION
Status: DISPENSED
Start: 2018-11-27

## (undated) RX ORDER — FENTANYL CITRATE 50 UG/ML
INJECTION, SOLUTION INTRAMUSCULAR; INTRAVENOUS
Status: DISPENSED
Start: 2019-03-13

## (undated) RX ORDER — GLYCOPYRROLATE 0.2 MG/ML
INJECTION, SOLUTION INTRAMUSCULAR; INTRAVENOUS
Status: DISPENSED
Start: 2019-03-13

## (undated) RX ORDER — PROPOFOL 10 MG/ML
INJECTION, EMULSION INTRAVENOUS
Status: DISPENSED
Start: 2017-12-01

## (undated) RX ORDER — KETOROLAC TROMETHAMINE 30 MG/ML
INJECTION, SOLUTION INTRAMUSCULAR; INTRAVENOUS
Status: DISPENSED
Start: 2018-07-24

## (undated) RX ORDER — ALBUMIN (HUMAN) 12.5 G/50ML
SOLUTION INTRAVENOUS
Status: DISPENSED
Start: 2019-12-30

## (undated) RX ORDER — LIDOCAINE HYDROCHLORIDE 10 MG/ML
INJECTION, SOLUTION EPIDURAL; INFILTRATION; INTRACAUDAL; PERINEURAL
Status: DISPENSED
Start: 2021-01-01

## (undated) RX ORDER — FENTANYL CITRATE 50 UG/ML
INJECTION, SOLUTION INTRAMUSCULAR; INTRAVENOUS
Status: DISPENSED
Start: 2020-01-01

## (undated) RX ORDER — BUPIVACAINE HYDROCHLORIDE AND EPINEPHRINE 5; 5 MG/ML; UG/ML
INJECTION, SOLUTION EPIDURAL; INTRACAUDAL; PERINEURAL
Status: DISPENSED
Start: 2018-04-07

## (undated) RX ORDER — LIDOCAINE HYDROCHLORIDE 10 MG/ML
INJECTION, SOLUTION EPIDURAL; INFILTRATION; INTRACAUDAL; PERINEURAL
Status: DISPENSED
Start: 2018-06-28

## (undated) RX ORDER — ONDANSETRON 2 MG/ML
INJECTION INTRAMUSCULAR; INTRAVENOUS
Status: DISPENSED
Start: 2017-12-06

## (undated) RX ORDER — HYDROMORPHONE HYDROCHLORIDE 1 MG/ML
INJECTION, SOLUTION INTRAMUSCULAR; INTRAVENOUS; SUBCUTANEOUS
Status: DISPENSED
Start: 2017-12-01

## (undated) RX ORDER — ONDANSETRON 2 MG/ML
INJECTION INTRAMUSCULAR; INTRAVENOUS
Status: DISPENSED
Start: 2018-07-24

## (undated) RX ORDER — GLYCOPYRROLATE 0.2 MG/ML
INJECTION, SOLUTION INTRAMUSCULAR; INTRAVENOUS
Status: DISPENSED
Start: 2018-06-28

## (undated) RX ORDER — FENTANYL CITRATE 50 UG/ML
INJECTION, SOLUTION INTRAMUSCULAR; INTRAVENOUS
Status: DISPENSED
Start: 2017-12-01

## (undated) RX ORDER — DEXAMETHASONE SODIUM PHOSPHATE 4 MG/ML
INJECTION, SOLUTION INTRA-ARTICULAR; INTRALESIONAL; INTRAMUSCULAR; INTRAVENOUS; SOFT TISSUE
Status: DISPENSED
Start: 2017-12-06

## (undated) RX ORDER — VECURONIUM BROMIDE 1 MG/ML
INJECTION, POWDER, LYOPHILIZED, FOR SOLUTION INTRAVENOUS
Status: DISPENSED
Start: 2018-11-27

## (undated) RX ORDER — ONDANSETRON 2 MG/ML
INJECTION INTRAMUSCULAR; INTRAVENOUS
Status: DISPENSED
Start: 2017-12-08

## (undated) RX ORDER — DEXAMETHASONE SODIUM PHOSPHATE 4 MG/ML
INJECTION, SOLUTION INTRA-ARTICULAR; INTRALESIONAL; INTRAMUSCULAR; INTRAVENOUS; SOFT TISSUE
Status: DISPENSED
Start: 2018-07-24